# Patient Record
Sex: MALE | Race: WHITE | HISPANIC OR LATINO | Employment: OTHER | URBAN - METROPOLITAN AREA
[De-identification: names, ages, dates, MRNs, and addresses within clinical notes are randomized per-mention and may not be internally consistent; named-entity substitution may affect disease eponyms.]

---

## 2017-01-06 ENCOUNTER — HOSPITAL ENCOUNTER (OUTPATIENT)
Dept: RADIOLOGY | Facility: HOSPITAL | Age: 57
Discharge: HOME | DRG: 641 | End: 2017-01-06
Attending: ORTHOPAEDIC SURGERY
Payer: MEDICARE

## 2017-01-06 ENCOUNTER — TRANSCRIBE ORDERS (OUTPATIENT)
Dept: ADMINISTRATIVE | Facility: HOSPITAL | Age: 57
End: 2017-01-06

## 2017-01-06 ENCOUNTER — ANESTHESIA EVENT (OUTPATIENT)
Dept: PERIOP | Facility: HOSPITAL | Age: 57
End: 2017-01-06
Payer: MEDICARE

## 2017-01-06 ENCOUNTER — APPOINTMENT (OUTPATIENT)
Dept: LAB | Facility: HOSPITAL | Age: 57
DRG: 641 | End: 2017-01-06
Attending: ORTHOPAEDIC SURGERY
Payer: MEDICARE

## 2017-01-06 ENCOUNTER — HOSPITAL ENCOUNTER (OUTPATIENT)
Dept: NON INVASIVE DIAGNOSTICS | Facility: HOSPITAL | Age: 57
Discharge: HOME | DRG: 641 | End: 2017-01-06
Attending: ORTHOPAEDIC SURGERY
Payer: MEDICARE

## 2017-01-06 ENCOUNTER — HOSPITAL ENCOUNTER (INPATIENT)
Facility: HOSPITAL | Age: 57
LOS: 3 days | Discharge: HOME | DRG: 641 | End: 2017-01-09
Attending: EMERGENCY MEDICINE | Admitting: INTERNAL MEDICINE
Payer: MEDICARE

## 2017-01-06 ENCOUNTER — GENERIC CONVERSION - ENCOUNTER (OUTPATIENT)
Dept: OTHER | Facility: OTHER | Age: 57
End: 2017-01-06

## 2017-01-06 ENCOUNTER — APPOINTMENT (OUTPATIENT)
Dept: PREADMISSION TESTING | Facility: HOSPITAL | Age: 57
DRG: 641 | End: 2017-01-06
Payer: MEDICARE

## 2017-01-06 VITALS
HEIGHT: 74 IN | BODY MASS INDEX: 40.43 KG/M2 | HEART RATE: 62 BPM | DIASTOLIC BLOOD PRESSURE: 56 MMHG | SYSTOLIC BLOOD PRESSURE: 107 MMHG | RESPIRATION RATE: 22 BRPM | WEIGHT: 315 LBS | TEMPERATURE: 98.8 F

## 2017-01-06 DIAGNOSIS — Z01.818 OTHER SPECIFIED PRE-OPERATIVE EXAMINATION: ICD-10-CM

## 2017-01-06 DIAGNOSIS — G89.4 CHRONIC PAIN SYNDROME: ICD-10-CM

## 2017-01-06 DIAGNOSIS — N17.9 ACUTE RENAL FAILURE (ARF) (HCC): ICD-10-CM

## 2017-01-06 DIAGNOSIS — R19.7 ACUTE DIARRHEA: ICD-10-CM

## 2017-01-06 DIAGNOSIS — G89.4 CHRONIC PAIN SYNDROME: Primary | ICD-10-CM

## 2017-01-06 DIAGNOSIS — E87.6 ACUTE HYPOKALEMIA: Primary | ICD-10-CM

## 2017-01-06 LAB
ABO GROUP BLD: NORMAL
ALBUMIN SERPL BCP-MCNC: 3.1 G/DL (ref 3.5–5)
ALP SERPL-CCNC: 158 U/L (ref 46–116)
ALT SERPL W P-5'-P-CCNC: 23 U/L (ref 12–78)
ANION GAP SERPL CALCULATED.3IONS-SCNC: 8 MMOL/L (ref 4–13)
AST SERPL W P-5'-P-CCNC: 18 U/L (ref 5–45)
BACTERIA UR QL AUTO: NORMAL /HPF
BASOPHILS # BLD AUTO: 0.03 THOUSANDS/ΜL (ref 0–0.1)
BASOPHILS NFR BLD AUTO: 0 % (ref 0–1)
BILIRUB SERPL-MCNC: 0.59 MG/DL (ref 0.2–1)
BILIRUB UR QL STRIP: NEGATIVE
BLD GP AB SCN SERPL QL: NEGATIVE
BUN SERPL-MCNC: 36 MG/DL (ref 5–25)
CALCIUM SERPL-MCNC: 8.6 MG/DL (ref 8.3–10.1)
CHLORIDE SERPL-SCNC: 86 MMOL/L (ref 100–108)
CLARITY UR: CLEAR
CO2 SERPL-SCNC: 39 MMOL/L (ref 21–32)
COLOR UR: YELLOW
CREAT SERPL-MCNC: 1.96 MG/DL (ref 0.6–1.3)
EOSINOPHIL # BLD AUTO: 0.04 THOUSAND/ΜL (ref 0–0.61)
EOSINOPHIL NFR BLD AUTO: 0 % (ref 0–6)
ERYTHROCYTE [DISTWIDTH] IN BLOOD BY AUTOMATED COUNT: 15.2 % (ref 11.6–15.1)
GFR SERPL CREATININE-BSD FRML MDRD: 35.8 ML/MIN/1.73SQ M
GLUCOSE SERPL-MCNC: 191 MG/DL (ref 65–140)
GLUCOSE UR STRIP-MCNC: ABNORMAL MG/DL
HCT VFR BLD AUTO: 43.2 % (ref 36.5–49.3)
HGB BLD-MCNC: 14.1 G/DL (ref 12–17)
HGB UR QL STRIP.AUTO: NEGATIVE
KETONES UR STRIP-MCNC: NEGATIVE MG/DL
LEUKOCYTE ESTERASE UR QL STRIP: ABNORMAL
LYMPHOCYTES # BLD AUTO: 1.68 THOUSANDS/ΜL (ref 0.6–4.47)
LYMPHOCYTES NFR BLD AUTO: 10 % (ref 14–44)
MAGNESIUM SERPL-MCNC: 3 MG/DL (ref 1.6–2.6)
MCH RBC QN AUTO: 28.8 PG (ref 26.8–34.3)
MCHC RBC AUTO-ENTMCNC: 32.6 G/DL (ref 31.4–37.4)
MCV RBC AUTO: 88 FL (ref 82–98)
MONOCYTES # BLD AUTO: 1.1 THOUSAND/ΜL (ref 0.17–1.22)
MONOCYTES NFR BLD AUTO: 7 % (ref 4–12)
NEUTROPHILS # BLD AUTO: 13.43 THOUSANDS/ΜL (ref 1.85–7.62)
NEUTS SEG NFR BLD AUTO: 83 % (ref 43–75)
NITRITE UR QL STRIP: NEGATIVE
NON-SQ EPI CELLS URNS QL MICRO: NORMAL /HPF
NRBC BLD AUTO-RTO: 0 /100 WBCS
NT-PROBNP SERPL-MCNC: 120 PG/ML
PH UR STRIP.AUTO: 7 [PH] (ref 4.5–8)
PHOSPHATE SERPL-MCNC: 3.4 MG/DL (ref 2.7–4.5)
PLATELET # BLD AUTO: 322 THOUSANDS/UL (ref 149–390)
PMV BLD AUTO: 10.8 FL (ref 8.9–12.7)
POTASSIUM SERPL-SCNC: 1.8 MMOL/L (ref 3.5–5.3)
POTASSIUM SERPL-SCNC: 2.2 MMOL/L (ref 3.5–5.3)
PROT SERPL-MCNC: 7.5 G/DL (ref 6.4–8.2)
PROT UR STRIP-MCNC: NEGATIVE MG/DL
RBC # BLD AUTO: 4.9 MILLION/UL (ref 3.88–5.62)
RBC #/AREA URNS AUTO: NORMAL /HPF
RH BLD: POSITIVE
SODIUM SERPL-SCNC: 133 MMOL/L (ref 136–145)
SP GR UR STRIP.AUTO: 1.01 (ref 1–1.03)
SPECIMEN SOURCE: NORMAL
TROPONIN I BLD-MCNC: 0.01 NG/ML (ref 0–0.08)
UROBILINOGEN UR QL STRIP.AUTO: 1 E.U./DL
WBC # BLD AUTO: 16.28 THOUSAND/UL (ref 4.31–10.16)
WBC #/AREA URNS AUTO: NORMAL /HPF

## 2017-01-06 PROCEDURE — 87086 URINE CULTURE/COLONY COUNT: CPT | Performed by: ORTHOPAEDIC SURGERY

## 2017-01-06 PROCEDURE — 86900 BLOOD TYPING SEROLOGIC ABO: CPT

## 2017-01-06 PROCEDURE — 87081 CULTURE SCREEN ONLY: CPT

## 2017-01-06 PROCEDURE — 85025 COMPLETE CBC W/AUTO DIFF WBC: CPT

## 2017-01-06 PROCEDURE — 93005 ELECTROCARDIOGRAM TRACING: CPT

## 2017-01-06 PROCEDURE — 86803 HEPATITIS C AB TEST: CPT

## 2017-01-06 PROCEDURE — 81001 URINALYSIS AUTO W/SCOPE: CPT | Performed by: ORTHOPAEDIC SURGERY

## 2017-01-06 PROCEDURE — 93005 ELECTROCARDIOGRAM TRACING: CPT | Performed by: EMERGENCY MEDICINE

## 2017-01-06 PROCEDURE — 86901 BLOOD TYPING SEROLOGIC RH(D): CPT

## 2017-01-06 PROCEDURE — 84132 ASSAY OF SERUM POTASSIUM: CPT | Performed by: PHYSICIAN ASSISTANT

## 2017-01-06 PROCEDURE — 83880 ASSAY OF NATRIURETIC PEPTIDE: CPT | Performed by: PHYSICIAN ASSISTANT

## 2017-01-06 PROCEDURE — 86850 RBC ANTIBODY SCREEN: CPT

## 2017-01-06 PROCEDURE — 80053 COMPREHEN METABOLIC PANEL: CPT

## 2017-01-06 PROCEDURE — 83735 ASSAY OF MAGNESIUM: CPT | Performed by: PHYSICIAN ASSISTANT

## 2017-01-06 PROCEDURE — 84484 ASSAY OF TROPONIN QUANT: CPT

## 2017-01-06 PROCEDURE — 96374 THER/PROPH/DIAG INJ IV PUSH: CPT

## 2017-01-06 PROCEDURE — 84100 ASSAY OF PHOSPHORUS: CPT | Performed by: PHYSICIAN ASSISTANT

## 2017-01-06 PROCEDURE — 36415 COLL VENOUS BLD VENIPUNCTURE: CPT

## 2017-01-06 RX ORDER — MORPHINE SULFATE 100 MG/1
100 TABLET ORAL 2 TIMES DAILY
Status: ON HOLD | COMMUNITY
End: 2019-05-09 | Stop reason: SDUPTHER

## 2017-01-06 RX ORDER — SODIUM CHLORIDE 9 MG/ML
125 INJECTION, SOLUTION INTRAVENOUS CONTINUOUS
Status: CANCELLED | OUTPATIENT
Start: 2017-01-06

## 2017-01-06 RX ORDER — POTASSIUM CHLORIDE 14.9 MG/ML
20 INJECTION INTRAVENOUS ONCE
Status: DISCONTINUED | OUTPATIENT
Start: 2017-01-06 | End: 2017-01-07

## 2017-01-06 RX ORDER — POTASSIUM CHLORIDE 20 MEQ/1
20 TABLET, EXTENDED RELEASE ORAL ONCE
Status: COMPLETED | OUTPATIENT
Start: 2017-01-06 | End: 2017-01-06

## 2017-01-06 RX ORDER — POTASSIUM CHLORIDE 14.9 MG/ML
20 INJECTION INTRAVENOUS ONCE
Status: COMPLETED | OUTPATIENT
Start: 2017-01-06 | End: 2017-01-06

## 2017-01-06 RX ORDER — OMEPRAZOLE 20 MG/1
20 CAPSULE, DELAYED RELEASE ORAL 2 TIMES DAILY
COMMUNITY
End: 2018-01-31 | Stop reason: DRUGHIGH

## 2017-01-06 RX ORDER — ZOLPIDEM TARTRATE 10 MG/1
10 TABLET ORAL
Status: ON HOLD | COMMUNITY
End: 2020-01-31 | Stop reason: ALTCHOICE

## 2017-01-06 RX ADMIN — POTASSIUM CHLORIDE 20 MEQ: 1500 TABLET, EXTENDED RELEASE ORAL at 20:58

## 2017-01-06 RX ADMIN — SODIUM CHLORIDE 500 ML: 0.9 INJECTION, SOLUTION INTRAVENOUS at 23:40

## 2017-01-06 RX ADMIN — POTASSIUM CHLORIDE 20 MEQ: 200 INJECTION, SOLUTION INTRAVENOUS at 23:41

## 2017-01-06 RX ADMIN — POTASSIUM CHLORIDE 20 MEQ: 200 INJECTION, SOLUTION INTRAVENOUS at 21:06

## 2017-01-06 RX ADMIN — SODIUM CHLORIDE 1000 ML: 0.9 INJECTION, SOLUTION INTRAVENOUS at 21:06

## 2017-01-07 ENCOUNTER — APPOINTMENT (INPATIENT)
Dept: RADIOLOGY | Facility: HOSPITAL | Age: 57
DRG: 641 | End: 2017-01-07
Payer: MEDICARE

## 2017-01-07 LAB
ANION GAP SERPL CALCULATED.3IONS-SCNC: 5 MMOL/L (ref 4–13)
ANION GAP SERPL CALCULATED.3IONS-SCNC: 6 MMOL/L (ref 4–13)
ATRIAL RATE: 55 BPM
ATRIAL RATE: 56 BPM
BACTERIA UR CULT: NORMAL
BUN SERPL-MCNC: 25 MG/DL (ref 5–25)
BUN SERPL-MCNC: 30 MG/DL (ref 5–25)
CALCIUM SERPL-MCNC: 7.9 MG/DL (ref 8.3–10.1)
CALCIUM SERPL-MCNC: 8.1 MG/DL (ref 8.3–10.1)
CHLORIDE SERPL-SCNC: 93 MMOL/L (ref 100–108)
CHLORIDE SERPL-SCNC: 97 MMOL/L (ref 100–108)
CO2 SERPL-SCNC: 30 MMOL/L (ref 21–32)
CO2 SERPL-SCNC: 38 MMOL/L (ref 21–32)
CREAT SERPL-MCNC: 1.54 MG/DL (ref 0.6–1.3)
CREAT SERPL-MCNC: 1.67 MG/DL (ref 0.6–1.3)
ERYTHROCYTE [DISTWIDTH] IN BLOOD BY AUTOMATED COUNT: 15.4 % (ref 11.6–15.1)
GFR SERPL CREATININE-BSD FRML MDRD: 43.1 ML/MIN/1.73SQ M
GFR SERPL CREATININE-BSD FRML MDRD: 47.3 ML/MIN/1.73SQ M
GLUCOSE SERPL-MCNC: 138 MG/DL (ref 65–140)
GLUCOSE SERPL-MCNC: 138 MG/DL (ref 65–140)
GLUCOSE SERPL-MCNC: 169 MG/DL (ref 65–140)
GLUCOSE SERPL-MCNC: 189 MG/DL (ref 65–140)
GLUCOSE SERPL-MCNC: 194 MG/DL (ref 65–140)
GLUCOSE SERPL-MCNC: 199 MG/DL (ref 65–140)
HCT VFR BLD AUTO: 39.9 % (ref 36.5–49.3)
HCV AB SER QL: NORMAL
HGB BLD-MCNC: 12.9 G/DL (ref 12–17)
MCH RBC QN AUTO: 28.7 PG (ref 26.8–34.3)
MCHC RBC AUTO-ENTMCNC: 32.3 G/DL (ref 31.4–37.4)
MCV RBC AUTO: 89 FL (ref 82–98)
MRSA NOSE QL CULT: NORMAL
P AXIS: 45 DEGREES
P AXIS: 89 DEGREES
PLATELET # BLD AUTO: 245 THOUSANDS/UL (ref 149–390)
PMV BLD AUTO: 10.3 FL (ref 8.9–12.7)
POTASSIUM SERPL-SCNC: 2.1 MMOL/L (ref 3.5–5.3)
POTASSIUM SERPL-SCNC: 2.9 MMOL/L (ref 3.5–5.3)
PR INTERVAL: 202 MS
PR INTERVAL: 208 MS
QRS AXIS: -18 DEGREES
QRS AXIS: -4 DEGREES
QRSD INTERVAL: 100 MS
QRSD INTERVAL: 98 MS
QT INTERVAL: 588 MS
QT INTERVAL: 604 MS
QTC INTERVAL: 562 MS
QTC INTERVAL: 582 MS
RBC # BLD AUTO: 4.49 MILLION/UL (ref 3.88–5.62)
SODIUM SERPL-SCNC: 133 MMOL/L (ref 136–145)
SODIUM SERPL-SCNC: 136 MMOL/L (ref 136–145)
T WAVE AXIS: 44 DEGREES
T WAVE AXIS: 60 DEGREES
VENTRICULAR RATE: 55 BPM
VENTRICULAR RATE: 56 BPM
WBC # BLD AUTO: 14.68 THOUSAND/UL (ref 4.31–10.16)

## 2017-01-07 PROCEDURE — 80048 BASIC METABOLIC PNL TOTAL CA: CPT | Performed by: PHYSICIAN ASSISTANT

## 2017-01-07 PROCEDURE — 87015 SPECIMEN INFECT AGNT CONCNTJ: CPT | Performed by: PHYSICIAN ASSISTANT

## 2017-01-07 PROCEDURE — 87045 FECES CULTURE AEROBIC BACT: CPT | Performed by: PHYSICIAN ASSISTANT

## 2017-01-07 PROCEDURE — 87899 AGENT NOS ASSAY W/OPTIC: CPT | Performed by: PHYSICIAN ASSISTANT

## 2017-01-07 PROCEDURE — 87493 C DIFF AMPLIFIED PROBE: CPT | Performed by: PHYSICIAN ASSISTANT

## 2017-01-07 PROCEDURE — 80048 BASIC METABOLIC PNL TOTAL CA: CPT | Performed by: INTERNAL MEDICINE

## 2017-01-07 PROCEDURE — 71010 HB CHEST X-RAY 1 VIEW FRONTAL (PORTABLE): CPT

## 2017-01-07 PROCEDURE — 82948 REAGENT STRIP/BLOOD GLUCOSE: CPT

## 2017-01-07 PROCEDURE — 87046 STOOL CULTR AEROBIC BACT EA: CPT | Performed by: PHYSICIAN ASSISTANT

## 2017-01-07 PROCEDURE — 89055 LEUKOCYTE ASSESSMENT FECAL: CPT | Performed by: PHYSICIAN ASSISTANT

## 2017-01-07 PROCEDURE — 99285 EMERGENCY DEPT VISIT HI MDM: CPT

## 2017-01-07 PROCEDURE — 85027 COMPLETE CBC AUTOMATED: CPT | Performed by: PHYSICIAN ASSISTANT

## 2017-01-07 RX ORDER — SODIUM CHLORIDE 9 MG/ML
125 INJECTION, SOLUTION INTRAVENOUS CONTINUOUS
Status: DISCONTINUED | OUTPATIENT
Start: 2017-01-07 | End: 2017-01-07

## 2017-01-07 RX ORDER — TOPIRAMATE 100 MG/1
100 TABLET, FILM COATED ORAL 2 TIMES DAILY
Status: DISCONTINUED | OUTPATIENT
Start: 2017-01-07 | End: 2017-01-09 | Stop reason: HOSPADM

## 2017-01-07 RX ORDER — NEBIVOLOL 10 MG/1
10 TABLET ORAL DAILY
Status: DISCONTINUED | OUTPATIENT
Start: 2017-01-07 | End: 2017-01-09 | Stop reason: HOSPADM

## 2017-01-07 RX ORDER — TRAZODONE HYDROCHLORIDE 100 MG/1
100 TABLET ORAL
Status: DISCONTINUED | OUTPATIENT
Start: 2017-01-07 | End: 2017-01-09 | Stop reason: HOSPADM

## 2017-01-07 RX ORDER — OXYCODONE HYDROCHLORIDE 5 MG/1
30 TABLET ORAL EVERY 4 HOURS PRN
Status: DISCONTINUED | OUTPATIENT
Start: 2017-01-07 | End: 2017-01-09 | Stop reason: HOSPADM

## 2017-01-07 RX ORDER — ASPIRIN 81 MG/1
81 TABLET, CHEWABLE ORAL DAILY
Status: DISCONTINUED | OUTPATIENT
Start: 2017-01-07 | End: 2017-01-09 | Stop reason: HOSPADM

## 2017-01-07 RX ORDER — POTASSIUM CHLORIDE AND SODIUM CHLORIDE 900; 300 MG/100ML; MG/100ML
75 INJECTION, SOLUTION INTRAVENOUS CONTINUOUS
Status: DISCONTINUED | OUTPATIENT
Start: 2017-01-07 | End: 2017-01-09 | Stop reason: HOSPADM

## 2017-01-07 RX ORDER — ZOLPIDEM TARTRATE 5 MG/1
10 TABLET ORAL
Status: DISCONTINUED | OUTPATIENT
Start: 2017-01-07 | End: 2017-01-09 | Stop reason: HOSPADM

## 2017-01-07 RX ORDER — TAMSULOSIN HYDROCHLORIDE 0.4 MG/1
0.4 CAPSULE ORAL
Status: DISCONTINUED | OUTPATIENT
Start: 2017-01-07 | End: 2017-01-09 | Stop reason: HOSPADM

## 2017-01-07 RX ORDER — ATORVASTATIN CALCIUM 40 MG/1
40 TABLET, FILM COATED ORAL
Status: DISCONTINUED | OUTPATIENT
Start: 2017-01-07 | End: 2017-01-09 | Stop reason: HOSPADM

## 2017-01-07 RX ORDER — POTASSIUM CHLORIDE 20MEQ/15ML
20 LIQUID (ML) ORAL ONCE
Status: DISCONTINUED | OUTPATIENT
Start: 2017-01-07 | End: 2017-01-07

## 2017-01-07 RX ORDER — BACLOFEN 10 MG/1
10 TABLET ORAL 3 TIMES DAILY
Status: DISCONTINUED | OUTPATIENT
Start: 2017-01-07 | End: 2017-01-09 | Stop reason: HOSPADM

## 2017-01-07 RX ORDER — NITROGLYCERIN 0.4 MG/1
0.4 TABLET SUBLINGUAL
Status: DISCONTINUED | OUTPATIENT
Start: 2017-01-07 | End: 2017-01-09 | Stop reason: HOSPADM

## 2017-01-07 RX ORDER — CALCIUM CARBONATE 200(500)MG
1000 TABLET,CHEWABLE ORAL DAILY PRN
Status: DISCONTINUED | OUTPATIENT
Start: 2017-01-07 | End: 2017-01-09 | Stop reason: HOSPADM

## 2017-01-07 RX ORDER — FLUTICASONE PROPIONATE 110 UG/1
1 AEROSOL, METERED RESPIRATORY (INHALATION)
Status: DISCONTINUED | OUTPATIENT
Start: 2017-01-07 | End: 2017-01-09 | Stop reason: HOSPADM

## 2017-01-07 RX ORDER — POTASSIUM CHLORIDE 20 MEQ/1
20 TABLET, EXTENDED RELEASE ORAL
Status: COMPLETED | OUTPATIENT
Start: 2017-01-07 | End: 2017-01-07

## 2017-01-07 RX ORDER — CITALOPRAM 20 MG/1
40 TABLET ORAL DAILY
Status: DISCONTINUED | OUTPATIENT
Start: 2017-01-07 | End: 2017-01-09 | Stop reason: HOSPADM

## 2017-01-07 RX ORDER — RANOLAZINE 500 MG/1
1000 TABLET, EXTENDED RELEASE ORAL 2 TIMES DAILY
Status: DISCONTINUED | OUTPATIENT
Start: 2017-01-07 | End: 2017-01-09 | Stop reason: HOSPADM

## 2017-01-07 RX ORDER — HEPARIN SODIUM 5000 [USP'U]/ML
5000 INJECTION, SOLUTION INTRAVENOUS; SUBCUTANEOUS EVERY 8 HOURS SCHEDULED
Status: DISCONTINUED | OUTPATIENT
Start: 2017-01-07 | End: 2017-01-09 | Stop reason: HOSPADM

## 2017-01-07 RX ORDER — PANTOPRAZOLE SODIUM 20 MG/1
20 TABLET, DELAYED RELEASE ORAL
Status: DISCONTINUED | OUTPATIENT
Start: 2017-01-07 | End: 2017-01-09 | Stop reason: HOSPADM

## 2017-01-07 RX ORDER — TIZANIDINE 2 MG/1
2 TABLET ORAL 3 TIMES DAILY
Status: DISCONTINUED | OUTPATIENT
Start: 2017-01-07 | End: 2017-01-09 | Stop reason: HOSPADM

## 2017-01-07 RX ORDER — GABAPENTIN 400 MG/1
800 CAPSULE ORAL 2 TIMES DAILY
Status: DISCONTINUED | OUTPATIENT
Start: 2017-01-07 | End: 2017-01-09 | Stop reason: HOSPADM

## 2017-01-07 RX ORDER — ONDANSETRON 2 MG/ML
4 INJECTION INTRAMUSCULAR; INTRAVENOUS EVERY 6 HOURS PRN
Status: DISCONTINUED | OUTPATIENT
Start: 2017-01-07 | End: 2017-01-09 | Stop reason: HOSPADM

## 2017-01-07 RX ORDER — INSULIN GLARGINE 100 [IU]/ML
90 INJECTION, SOLUTION SUBCUTANEOUS
Status: DISCONTINUED | OUTPATIENT
Start: 2017-01-07 | End: 2017-01-09 | Stop reason: HOSPADM

## 2017-01-07 RX ORDER — ALBUTEROL SULFATE 90 UG/1
1 AEROSOL, METERED RESPIRATORY (INHALATION) EVERY 4 HOURS PRN
Status: DISCONTINUED | OUTPATIENT
Start: 2017-01-07 | End: 2017-01-09 | Stop reason: HOSPADM

## 2017-01-07 RX ORDER — POTASSIUM CHLORIDE 20 MEQ/1
40 TABLET, EXTENDED RELEASE ORAL ONCE
Status: COMPLETED | OUTPATIENT
Start: 2017-01-07 | End: 2017-01-07

## 2017-01-07 RX ORDER — CLOPIDOGREL BISULFATE 75 MG/1
75 TABLET ORAL DAILY
Status: DISCONTINUED | OUTPATIENT
Start: 2017-01-07 | End: 2017-01-09 | Stop reason: HOSPADM

## 2017-01-07 RX ORDER — ACETAMINOPHEN 325 MG/1
650 TABLET ORAL EVERY 4 HOURS PRN
Status: DISCONTINUED | OUTPATIENT
Start: 2017-01-07 | End: 2017-01-09 | Stop reason: HOSPADM

## 2017-01-07 RX ADMIN — MORPHINE SULFATE 105 MG: 30 TABLET, EXTENDED RELEASE ORAL at 20:49

## 2017-01-07 RX ADMIN — FLUTICASONE PROPIONATE 1 PUFF: 110 AEROSOL, METERED RESPIRATORY (INHALATION) at 02:00

## 2017-01-07 RX ADMIN — TOPIRAMATE 100 MG: 100 TABLET, FILM COATED ORAL at 09:52

## 2017-01-07 RX ADMIN — BACLOFEN 10 MG: 10 TABLET ORAL at 20:45

## 2017-01-07 RX ADMIN — FLUTICASONE PROPIONATE AND SALMETEROL 1 PUFF: 50; 250 POWDER RESPIRATORY (INHALATION) at 09:54

## 2017-01-07 RX ADMIN — POTASSIUM CHLORIDE 20 MEQ: 1500 TABLET, EXTENDED RELEASE ORAL at 09:49

## 2017-01-07 RX ADMIN — BACLOFEN 10 MG: 10 TABLET ORAL at 09:51

## 2017-01-07 RX ADMIN — TIZANIDINE HYDROCHLORIDE 2 MG: 2 TABLET ORAL at 09:53

## 2017-01-07 RX ADMIN — GABAPENTIN 800 MG: 400 CAPSULE ORAL at 17:42

## 2017-01-07 RX ADMIN — MORPHINE SULFATE 105 MG: 30 TABLET, EXTENDED RELEASE ORAL at 09:48

## 2017-01-07 RX ADMIN — BACLOFEN 10 MG: 10 TABLET ORAL at 15:25

## 2017-01-07 RX ADMIN — CLOPIDOGREL BISULFATE 75 MG: 75 TABLET ORAL at 09:49

## 2017-01-07 RX ADMIN — TOPIRAMATE 100 MG: 100 TABLET, FILM COATED ORAL at 17:45

## 2017-01-07 RX ADMIN — POTASSIUM CHLORIDE 40 MEQ: 1500 TABLET, EXTENDED RELEASE ORAL at 20:49

## 2017-01-07 RX ADMIN — NEBIVOLOL HYDROCHLORIDE 10 MG: 10 TABLET ORAL at 09:52

## 2017-01-07 RX ADMIN — TRAZODONE HYDROCHLORIDE 100 MG: 100 TABLET ORAL at 21:59

## 2017-01-07 RX ADMIN — POTASSIUM CHLORIDE AND SODIUM CHLORIDE 75 ML/HR: 900; 300 INJECTION, SOLUTION INTRAVENOUS at 02:05

## 2017-01-07 RX ADMIN — POTASSIUM CHLORIDE 20 MEQ: 1500 TABLET, EXTENDED RELEASE ORAL at 11:25

## 2017-01-07 RX ADMIN — ATORVASTATIN CALCIUM 40 MG: 20 TABLET, FILM COATED ORAL at 16:10

## 2017-01-07 RX ADMIN — HEPARIN SODIUM 5000 UNITS: 5000 INJECTION, SOLUTION INTRAVENOUS; SUBCUTANEOUS at 21:59

## 2017-01-07 RX ADMIN — POTASSIUM CHLORIDE 20 MEQ: 1500 TABLET, EXTENDED RELEASE ORAL at 15:24

## 2017-01-07 RX ADMIN — CITALOPRAM HYDROBROMIDE 40 MG: 20 TABLET ORAL at 09:52

## 2017-01-07 RX ADMIN — FLUTICASONE PROPIONATE AND SALMETEROL 1 PUFF: 50; 250 POWDER RESPIRATORY (INHALATION) at 17:41

## 2017-01-07 RX ADMIN — FLUTICASONE PROPIONATE 1 PUFF: 110 AEROSOL, METERED RESPIRATORY (INHALATION) at 20:44

## 2017-01-07 RX ADMIN — GABAPENTIN 800 MG: 400 CAPSULE ORAL at 12:13

## 2017-01-07 RX ADMIN — TRAZODONE HYDROCHLORIDE 100 MG: 100 TABLET ORAL at 02:00

## 2017-01-07 RX ADMIN — TIZANIDINE HYDROCHLORIDE 2 MG: 2 TABLET ORAL at 16:02

## 2017-01-07 RX ADMIN — ALBUTEROL SULFATE 1 PUFF: 90 AEROSOL, METERED RESPIRATORY (INHALATION) at 10:01

## 2017-01-07 RX ADMIN — LUBIPROSTONE 24 MCG: 24 CAPSULE, GELATIN COATED ORAL at 09:51

## 2017-01-07 RX ADMIN — PANTOPRAZOLE SODIUM 20 MG: 20 TABLET, DELAYED RELEASE ORAL at 05:48

## 2017-01-07 RX ADMIN — OXYCODONE HYDROCHLORIDE 30 MG: 5 TABLET ORAL at 16:11

## 2017-01-07 RX ADMIN — RANOLAZINE 1000 MG: 500 TABLET, FILM COATED, EXTENDED RELEASE ORAL at 09:52

## 2017-01-07 RX ADMIN — OXYCODONE HYDROCHLORIDE 30 MG: 5 TABLET ORAL at 05:59

## 2017-01-07 RX ADMIN — POTASSIUM CHLORIDE 20 MEQ: 1500 TABLET, EXTENDED RELEASE ORAL at 12:24

## 2017-01-07 RX ADMIN — INSULIN GLARGINE 90 UNITS: 100 INJECTION, SOLUTION SUBCUTANEOUS at 21:59

## 2017-01-07 RX ADMIN — LUBIPROSTONE 24 MCG: 24 CAPSULE, GELATIN COATED ORAL at 16:01

## 2017-01-07 RX ADMIN — MORPHINE SULFATE 105 MG: 30 TABLET, EXTENDED RELEASE ORAL at 16:10

## 2017-01-07 RX ADMIN — TAMSULOSIN HYDROCHLORIDE 0.4 MG: 0.4 CAPSULE ORAL at 16:11

## 2017-01-07 RX ADMIN — INSULIN LISPRO 1 UNITS: 100 INJECTION, SOLUTION INTRAVENOUS; SUBCUTANEOUS at 16:15

## 2017-01-07 RX ADMIN — OXYCODONE HYDROCHLORIDE 30 MG: 5 TABLET ORAL at 22:05

## 2017-01-07 RX ADMIN — HEPARIN SODIUM 5000 UNITS: 5000 INJECTION, SOLUTION INTRAVENOUS; SUBCUTANEOUS at 06:02

## 2017-01-07 RX ADMIN — ASPIRIN 81 MG 81 MG: 81 TABLET ORAL at 09:48

## 2017-01-07 RX ADMIN — ALBUTEROL SULFATE 1 PUFF: 90 AEROSOL, METERED RESPIRATORY (INHALATION) at 10:02

## 2017-01-07 RX ADMIN — OXYCODONE HYDROCHLORIDE 30 MG: 5 TABLET ORAL at 12:11

## 2017-01-07 RX ADMIN — HEPARIN SODIUM 5000 UNITS: 5000 INJECTION, SOLUTION INTRAVENOUS; SUBCUTANEOUS at 14:42

## 2017-01-07 RX ADMIN — TIOTROPIUM BROMIDE 18 MCG: 18 CAPSULE ORAL; RESPIRATORY (INHALATION) at 09:54

## 2017-01-07 RX ADMIN — TIZANIDINE HYDROCHLORIDE 2 MG: 2 TABLET ORAL at 20:45

## 2017-01-07 RX ADMIN — RANOLAZINE 1000 MG: 500 TABLET, FILM COATED, EXTENDED RELEASE ORAL at 17:42

## 2017-01-08 LAB
ANION GAP SERPL CALCULATED.3IONS-SCNC: 4 MMOL/L (ref 4–13)
BUN SERPL-MCNC: 19 MG/DL (ref 5–25)
C DIFF TOX GENS STL QL NAA+PROBE: NORMAL
CALCIUM SERPL-MCNC: 7.8 MG/DL (ref 8.3–10.1)
CHLORIDE SERPL-SCNC: 104 MMOL/L (ref 100–108)
CO2 SERPL-SCNC: 32 MMOL/L (ref 21–32)
CREAT SERPL-MCNC: 1.26 MG/DL (ref 0.6–1.3)
GFR SERPL CREATININE-BSD FRML MDRD: 59.6 ML/MIN/1.73SQ M
GLUCOSE SERPL-MCNC: 131 MG/DL (ref 65–140)
GLUCOSE SERPL-MCNC: 134 MG/DL (ref 65–140)
GLUCOSE SERPL-MCNC: 145 MG/DL (ref 65–140)
GLUCOSE SERPL-MCNC: 168 MG/DL (ref 65–140)
POTASSIUM SERPL-SCNC: 3 MMOL/L (ref 3.5–5.3)
SODIUM SERPL-SCNC: 140 MMOL/L (ref 136–145)

## 2017-01-08 PROCEDURE — 82948 REAGENT STRIP/BLOOD GLUCOSE: CPT

## 2017-01-08 PROCEDURE — 80048 BASIC METABOLIC PNL TOTAL CA: CPT | Performed by: INTERNAL MEDICINE

## 2017-01-08 RX ORDER — POTASSIUM CHLORIDE 20 MEQ/1
40 TABLET, EXTENDED RELEASE ORAL 2 TIMES DAILY
Status: DISCONTINUED | OUTPATIENT
Start: 2017-01-08 | End: 2017-01-08

## 2017-01-08 RX ORDER — POTASSIUM CHLORIDE 20 MEQ/1
40 TABLET, EXTENDED RELEASE ORAL DAILY
Status: DISCONTINUED | OUTPATIENT
Start: 2017-01-08 | End: 2017-01-09 | Stop reason: HOSPADM

## 2017-01-08 RX ADMIN — TOPIRAMATE 100 MG: 100 TABLET, FILM COATED ORAL at 18:04

## 2017-01-08 RX ADMIN — TRAZODONE HYDROCHLORIDE 100 MG: 100 TABLET ORAL at 21:10

## 2017-01-08 RX ADMIN — BACLOFEN 10 MG: 10 TABLET ORAL at 20:21

## 2017-01-08 RX ADMIN — GABAPENTIN 800 MG: 400 CAPSULE ORAL at 18:04

## 2017-01-08 RX ADMIN — OXYCODONE HYDROCHLORIDE 30 MG: 5 TABLET ORAL at 12:18

## 2017-01-08 RX ADMIN — BACLOFEN 10 MG: 10 TABLET ORAL at 16:32

## 2017-01-08 RX ADMIN — GABAPENTIN 800 MG: 400 CAPSULE ORAL at 09:23

## 2017-01-08 RX ADMIN — CITALOPRAM HYDROBROMIDE 40 MG: 20 TABLET ORAL at 09:20

## 2017-01-08 RX ADMIN — TIOTROPIUM BROMIDE 18 MCG: 18 CAPSULE ORAL; RESPIRATORY (INHALATION) at 09:24

## 2017-01-08 RX ADMIN — RANOLAZINE 1000 MG: 500 TABLET, FILM COATED, EXTENDED RELEASE ORAL at 09:21

## 2017-01-08 RX ADMIN — MORPHINE SULFATE 105 MG: 30 TABLET, EXTENDED RELEASE ORAL at 20:21

## 2017-01-08 RX ADMIN — BACLOFEN 10 MG: 10 TABLET ORAL at 12:18

## 2017-01-08 RX ADMIN — MORPHINE SULFATE 105 MG: 30 TABLET, EXTENDED RELEASE ORAL at 09:16

## 2017-01-08 RX ADMIN — RANOLAZINE 1000 MG: 500 TABLET, FILM COATED, EXTENDED RELEASE ORAL at 18:04

## 2017-01-08 RX ADMIN — TAMSULOSIN HYDROCHLORIDE 0.4 MG: 0.4 CAPSULE ORAL at 16:18

## 2017-01-08 RX ADMIN — FLUTICASONE PROPIONATE 1 PUFF: 110 AEROSOL, METERED RESPIRATORY (INHALATION) at 09:25

## 2017-01-08 RX ADMIN — HEPARIN SODIUM 5000 UNITS: 5000 INJECTION, SOLUTION INTRAVENOUS; SUBCUTANEOUS at 14:21

## 2017-01-08 RX ADMIN — CLOPIDOGREL BISULFATE 75 MG: 75 TABLET ORAL at 09:17

## 2017-01-08 RX ADMIN — POTASSIUM CHLORIDE 40 MEQ: 1500 TABLET, EXTENDED RELEASE ORAL at 12:18

## 2017-01-08 RX ADMIN — ATORVASTATIN CALCIUM 40 MG: 20 TABLET, FILM COATED ORAL at 16:18

## 2017-01-08 RX ADMIN — OXYCODONE HYDROCHLORIDE 30 MG: 5 TABLET ORAL at 16:20

## 2017-01-08 RX ADMIN — FLUTICASONE PROPIONATE 1 PUFF: 110 AEROSOL, METERED RESPIRATORY (INHALATION) at 20:24

## 2017-01-08 RX ADMIN — FLUTICASONE PROPIONATE AND SALMETEROL 1 PUFF: 50; 250 POWDER RESPIRATORY (INHALATION) at 18:04

## 2017-01-08 RX ADMIN — INSULIN GLARGINE 90 UNITS: 100 INJECTION, SOLUTION SUBCUTANEOUS at 21:10

## 2017-01-08 RX ADMIN — HEPARIN SODIUM 5000 UNITS: 5000 INJECTION, SOLUTION INTRAVENOUS; SUBCUTANEOUS at 05:35

## 2017-01-08 RX ADMIN — TIZANIDINE HYDROCHLORIDE 2 MG: 2 TABLET ORAL at 16:32

## 2017-01-08 RX ADMIN — OXYCODONE HYDROCHLORIDE 30 MG: 5 TABLET ORAL at 03:07

## 2017-01-08 RX ADMIN — FLUTICASONE PROPIONATE AND SALMETEROL 1 PUFF: 50; 250 POWDER RESPIRATORY (INHALATION) at 09:16

## 2017-01-08 RX ADMIN — TIZANIDINE HYDROCHLORIDE 2 MG: 2 TABLET ORAL at 20:20

## 2017-01-08 RX ADMIN — PANTOPRAZOLE SODIUM 20 MG: 20 TABLET, DELAYED RELEASE ORAL at 05:35

## 2017-01-08 RX ADMIN — TIZANIDINE HYDROCHLORIDE 2 MG: 2 TABLET ORAL at 09:21

## 2017-01-08 RX ADMIN — NEBIVOLOL HYDROCHLORIDE 10 MG: 10 TABLET ORAL at 09:21

## 2017-01-08 RX ADMIN — ASPIRIN 81 MG 81 MG: 81 TABLET ORAL at 09:16

## 2017-01-08 RX ADMIN — OXYCODONE HYDROCHLORIDE 30 MG: 5 TABLET ORAL at 20:20

## 2017-01-08 RX ADMIN — LUBIPROSTONE 24 MCG: 24 CAPSULE, GELATIN COATED ORAL at 09:23

## 2017-01-08 RX ADMIN — LUBIPROSTONE 24 MCG: 24 CAPSULE, GELATIN COATED ORAL at 16:31

## 2017-01-08 RX ADMIN — MORPHINE SULFATE 105 MG: 30 TABLET, EXTENDED RELEASE ORAL at 16:32

## 2017-01-08 RX ADMIN — OXYCODONE HYDROCHLORIDE 30 MG: 5 TABLET ORAL at 07:34

## 2017-01-08 RX ADMIN — HEPARIN SODIUM 5000 UNITS: 5000 INJECTION, SOLUTION INTRAVENOUS; SUBCUTANEOUS at 21:10

## 2017-01-08 RX ADMIN — POTASSIUM CHLORIDE AND SODIUM CHLORIDE 75 ML/HR: 900; 300 INJECTION, SOLUTION INTRAVENOUS at 15:51

## 2017-01-08 RX ADMIN — TOPIRAMATE 100 MG: 100 TABLET, FILM COATED ORAL at 09:20

## 2017-01-09 VITALS
RESPIRATION RATE: 20 BRPM | BODY MASS INDEX: 39.17 KG/M2 | DIASTOLIC BLOOD PRESSURE: 57 MMHG | OXYGEN SATURATION: 93 % | SYSTOLIC BLOOD PRESSURE: 117 MMHG | HEIGHT: 75 IN | TEMPERATURE: 98.5 F | WEIGHT: 315 LBS | HEART RATE: 67 BPM

## 2017-01-09 LAB
ANION GAP SERPL CALCULATED.3IONS-SCNC: 6 MMOL/L (ref 4–13)
BACTERIA STL CULT: NORMAL
BACTERIA STL CULT: NORMAL
BUN SERPL-MCNC: 14 MG/DL (ref 5–25)
CALCIUM SERPL-MCNC: 8.2 MG/DL (ref 8.3–10.1)
CHLORIDE SERPL-SCNC: 105 MMOL/L (ref 100–108)
CO2 SERPL-SCNC: 27 MMOL/L (ref 21–32)
CREAT SERPL-MCNC: 1.15 MG/DL (ref 0.6–1.3)
GFR SERPL CREATININE-BSD FRML MDRD: >60 ML/MIN/1.73SQ M
GLUCOSE SERPL-MCNC: 146 MG/DL (ref 65–140)
GLUCOSE SERPL-MCNC: 167 MG/DL (ref 65–140)
GLUCOSE SERPL-MCNC: 173 MG/DL (ref 65–140)
POTASSIUM SERPL-SCNC: 3.3 MMOL/L (ref 3.5–5.3)
SODIUM SERPL-SCNC: 138 MMOL/L (ref 136–145)

## 2017-01-09 PROCEDURE — 82948 REAGENT STRIP/BLOOD GLUCOSE: CPT

## 2017-01-09 PROCEDURE — 80048 BASIC METABOLIC PNL TOTAL CA: CPT | Performed by: HOSPITALIST

## 2017-01-09 RX ORDER — POTASSIUM CHLORIDE 20 MEQ/1
40 TABLET, EXTENDED RELEASE ORAL ONCE
Status: COMPLETED | OUTPATIENT
Start: 2017-01-09 | End: 2017-01-09

## 2017-01-09 RX ADMIN — OXYCODONE HYDROCHLORIDE 30 MG: 5 TABLET ORAL at 05:57

## 2017-01-09 RX ADMIN — GABAPENTIN 800 MG: 400 CAPSULE ORAL at 08:53

## 2017-01-09 RX ADMIN — INSULIN LISPRO 1 UNITS: 100 INJECTION, SOLUTION INTRAVENOUS; SUBCUTANEOUS at 08:54

## 2017-01-09 RX ADMIN — TIZANIDINE HYDROCHLORIDE 2 MG: 2 TABLET ORAL at 08:56

## 2017-01-09 RX ADMIN — TOPIRAMATE 100 MG: 100 TABLET, FILM COATED ORAL at 08:57

## 2017-01-09 RX ADMIN — BACLOFEN 10 MG: 10 TABLET ORAL at 08:53

## 2017-01-09 RX ADMIN — INSULIN LISPRO 1 UNITS: 100 INJECTION, SOLUTION INTRAVENOUS; SUBCUTANEOUS at 12:21

## 2017-01-09 RX ADMIN — PANTOPRAZOLE SODIUM 20 MG: 20 TABLET, DELAYED RELEASE ORAL at 05:54

## 2017-01-09 RX ADMIN — LUBIPROSTONE 24 MCG: 24 CAPSULE, GELATIN COATED ORAL at 08:56

## 2017-01-09 RX ADMIN — HEPARIN SODIUM 5000 UNITS: 5000 INJECTION, SOLUTION INTRAVENOUS; SUBCUTANEOUS at 05:54

## 2017-01-09 RX ADMIN — FLUTICASONE PROPIONATE AND SALMETEROL 1 PUFF: 50; 250 POWDER RESPIRATORY (INHALATION) at 08:55

## 2017-01-09 RX ADMIN — POTASSIUM CHLORIDE 40 MEQ: 1500 TABLET, EXTENDED RELEASE ORAL at 12:20

## 2017-01-09 RX ADMIN — RANOLAZINE 1000 MG: 500 TABLET, FILM COATED, EXTENDED RELEASE ORAL at 08:57

## 2017-01-09 RX ADMIN — NEBIVOLOL HYDROCHLORIDE 10 MG: 10 TABLET ORAL at 08:56

## 2017-01-09 RX ADMIN — MORPHINE SULFATE 105 MG: 30 TABLET, EXTENDED RELEASE ORAL at 08:53

## 2017-01-09 RX ADMIN — POTASSIUM CHLORIDE 40 MEQ: 1500 TABLET, EXTENDED RELEASE ORAL at 08:53

## 2017-01-09 RX ADMIN — FLUTICASONE PROPIONATE 1 PUFF: 110 AEROSOL, METERED RESPIRATORY (INHALATION) at 08:58

## 2017-01-09 RX ADMIN — ASPIRIN 81 MG 81 MG: 81 TABLET ORAL at 08:53

## 2017-01-09 RX ADMIN — CITALOPRAM HYDROBROMIDE 40 MG: 20 TABLET ORAL at 08:53

## 2017-01-09 RX ADMIN — TIOTROPIUM BROMIDE 18 MCG: 18 CAPSULE ORAL; RESPIRATORY (INHALATION) at 08:55

## 2017-01-09 RX ADMIN — CLOPIDOGREL BISULFATE 75 MG: 75 TABLET ORAL at 08:53

## 2017-01-09 RX ADMIN — OXYCODONE HYDROCHLORIDE 30 MG: 5 TABLET ORAL at 10:45

## 2017-01-11 LAB — WBC SPEC QL GRAM STN: NORMAL

## 2017-01-18 ENCOUNTER — ALLSCRIPTS OFFICE VISIT (OUTPATIENT)
Dept: OTHER | Facility: OTHER | Age: 57
End: 2017-01-18

## 2017-01-19 ENCOUNTER — ANESTHESIA (OUTPATIENT)
Dept: PERIOP | Facility: HOSPITAL | Age: 57
End: 2017-01-19
Payer: MEDICARE

## 2017-01-19 ENCOUNTER — HOSPITAL ENCOUNTER (OUTPATIENT)
Dept: RADIOLOGY | Facility: HOSPITAL | Age: 57
Setting detail: OUTPATIENT SURGERY
Discharge: HOME | End: 2017-01-19
Payer: MEDICARE

## 2017-01-19 ENCOUNTER — HOSPITAL ENCOUNTER (OUTPATIENT)
Facility: HOSPITAL | Age: 57
Setting detail: OUTPATIENT SURGERY
Discharge: HOME | End: 2017-01-19
Attending: ORTHOPAEDIC SURGERY | Admitting: ORTHOPAEDIC SURGERY
Payer: MEDICARE

## 2017-01-19 VITALS
OXYGEN SATURATION: 98 % | HEIGHT: 74 IN | TEMPERATURE: 99.1 F | BODY MASS INDEX: 40.43 KG/M2 | WEIGHT: 315 LBS | SYSTOLIC BLOOD PRESSURE: 168 MMHG | HEART RATE: 82 BPM | RESPIRATION RATE: 14 BRPM | DIASTOLIC BLOOD PRESSURE: 58 MMHG

## 2017-01-19 DIAGNOSIS — G89.4 CHRONIC PAIN SYNDROME: ICD-10-CM

## 2017-01-19 LAB
GLUCOSE SERPL-MCNC: 123 MG/DL (ref 65–140)
GLUCOSE SERPL-MCNC: 138 MG/DL (ref 65–140)

## 2017-01-19 PROCEDURE — 82948 REAGENT STRIP/BLOOD GLUCOSE: CPT

## 2017-01-19 PROCEDURE — C1772 INFUSION PUMP, PROGRAMMABLE: HCPCS | Performed by: ORTHOPAEDIC SURGERY

## 2017-01-19 DEVICE — INTRATHECAL PAIN PUMP 20ML: Type: IMPLANTABLE DEVICE | Status: FUNCTIONAL

## 2017-01-19 RX ORDER — ROCURONIUM BROMIDE 10 MG/ML
INJECTION, SOLUTION INTRAVENOUS AS NEEDED
Status: DISCONTINUED | OUTPATIENT
Start: 2017-01-19 | End: 2017-01-19 | Stop reason: SURG

## 2017-01-19 RX ORDER — MEPERIDINE HYDROCHLORIDE 50 MG/ML
12.5 INJECTION INTRAMUSCULAR; INTRAVENOUS; SUBCUTANEOUS AS NEEDED
Status: DISCONTINUED | OUTPATIENT
Start: 2017-01-19 | End: 2017-01-19 | Stop reason: HOSPADM

## 2017-01-19 RX ORDER — ONDANSETRON 2 MG/ML
4 INJECTION INTRAMUSCULAR; INTRAVENOUS ONCE
Status: DISCONTINUED | OUTPATIENT
Start: 2017-01-19 | End: 2017-01-19 | Stop reason: HOSPADM

## 2017-01-19 RX ORDER — BUPIVACAINE HYDROCHLORIDE AND EPINEPHRINE 2.5; 5 MG/ML; UG/ML
INJECTION, SOLUTION EPIDURAL; INFILTRATION; INTRACAUDAL; PERINEURAL AS NEEDED
Status: DISCONTINUED | OUTPATIENT
Start: 2017-01-19 | End: 2017-01-19 | Stop reason: HOSPADM

## 2017-01-19 RX ORDER — MIDAZOLAM HYDROCHLORIDE 1 MG/ML
INJECTION INTRAMUSCULAR; INTRAVENOUS AS NEEDED
Status: DISCONTINUED | OUTPATIENT
Start: 2017-01-19 | End: 2017-01-19 | Stop reason: SURG

## 2017-01-19 RX ORDER — HYDROCODONE BITARTRATE AND ACETAMINOPHEN 5; 325 MG/1; MG/1
2 TABLET ORAL EVERY 4 HOURS PRN
Status: CANCELLED | OUTPATIENT
Start: 2017-01-19

## 2017-01-19 RX ORDER — FENTANYL CITRATE/PF 50 MCG/ML
25 SYRINGE (ML) INJECTION
Status: DISCONTINUED | OUTPATIENT
Start: 2017-01-19 | End: 2017-01-19 | Stop reason: HOSPADM

## 2017-01-19 RX ORDER — GINSENG 100 MG
CAPSULE ORAL AS NEEDED
Status: DISCONTINUED | OUTPATIENT
Start: 2017-01-19 | End: 2017-01-19 | Stop reason: HOSPADM

## 2017-01-19 RX ORDER — CELECOXIB 100 MG/1
100 CAPSULE ORAL ONCE
Status: DISCONTINUED | OUTPATIENT
Start: 2017-01-19 | End: 2017-01-19 | Stop reason: CLARIF

## 2017-01-19 RX ORDER — ACETAMINOPHEN 325 MG/1
975 TABLET ORAL ONCE
Status: COMPLETED | OUTPATIENT
Start: 2017-01-19 | End: 2017-01-19

## 2017-01-19 RX ORDER — FENTANYL CITRATE 50 UG/ML
INJECTION, SOLUTION INTRAMUSCULAR; INTRAVENOUS AS NEEDED
Status: DISCONTINUED | OUTPATIENT
Start: 2017-01-19 | End: 2017-01-19 | Stop reason: SURG

## 2017-01-19 RX ORDER — GLYCOPYRROLATE 0.2 MG/ML
INJECTION INTRAMUSCULAR; INTRAVENOUS AS NEEDED
Status: DISCONTINUED | OUTPATIENT
Start: 2017-01-19 | End: 2017-01-19 | Stop reason: SURG

## 2017-01-19 RX ORDER — CELECOXIB 200 MG/1
200 CAPSULE ORAL ONCE
Status: COMPLETED | OUTPATIENT
Start: 2017-01-19 | End: 2017-01-19

## 2017-01-19 RX ORDER — SODIUM CHLORIDE 9 MG/ML
125 INJECTION, SOLUTION INTRAVENOUS CONTINUOUS
Status: DISCONTINUED | OUTPATIENT
Start: 2017-01-19 | End: 2017-01-19 | Stop reason: HOSPADM

## 2017-01-19 RX ORDER — PROPOFOL 10 MG/ML
INJECTION, EMULSION INTRAVENOUS AS NEEDED
Status: DISCONTINUED | OUTPATIENT
Start: 2017-01-19 | End: 2017-01-19 | Stop reason: SURG

## 2017-01-19 RX ORDER — LIDOCAINE HYDROCHLORIDE 10 MG/ML
INJECTION, SOLUTION INFILTRATION; PERINEURAL AS NEEDED
Status: DISCONTINUED | OUTPATIENT
Start: 2017-01-19 | End: 2017-01-19 | Stop reason: SURG

## 2017-01-19 RX ORDER — MORPHINE SULFATE 2 MG/ML
1 INJECTION, SOLUTION INTRAMUSCULAR; INTRAVENOUS
Status: CANCELLED | OUTPATIENT
Start: 2017-01-19

## 2017-01-19 RX ORDER — SODIUM CHLORIDE, SODIUM LACTATE, POTASSIUM CHLORIDE, CALCIUM CHLORIDE 600; 310; 30; 20 MG/100ML; MG/100ML; MG/100ML; MG/100ML
100 INJECTION, SOLUTION INTRAVENOUS CONTINUOUS
Status: DISCONTINUED | OUTPATIENT
Start: 2017-01-19 | End: 2017-01-19 | Stop reason: HOSPADM

## 2017-01-19 RX ORDER — ONDANSETRON 2 MG/ML
INJECTION INTRAMUSCULAR; INTRAVENOUS AS NEEDED
Status: DISCONTINUED | OUTPATIENT
Start: 2017-01-19 | End: 2017-01-19 | Stop reason: SURG

## 2017-01-19 RX ADMIN — ROCURONIUM BROMIDE 30 MG: 10 INJECTION, SOLUTION INTRAVENOUS at 08:58

## 2017-01-19 RX ADMIN — FENTANYL CITRATE 50 MCG: 50 INJECTION, SOLUTION INTRAMUSCULAR; INTRAVENOUS at 11:04

## 2017-01-19 RX ADMIN — GLYCOPYRROLATE 0.4 MG: 0.2 INJECTION, SOLUTION INTRAMUSCULAR; INTRAVENOUS at 10:28

## 2017-01-19 RX ADMIN — SODIUM CHLORIDE 125 ML/HR: 0.9 INJECTION, SOLUTION INTRAVENOUS at 08:47

## 2017-01-19 RX ADMIN — CELECOXIB 200 MG: 200 CAPSULE ORAL at 07:48

## 2017-01-19 RX ADMIN — PROPOFOL 200 MG: 10 INJECTION, EMULSION INTRAVENOUS at 08:58

## 2017-01-19 RX ADMIN — ACETAMINOPHEN 975 MG: 325 TABLET, FILM COATED ORAL at 07:48

## 2017-01-19 RX ADMIN — SODIUM CHLORIDE: 0.9 INJECTION, SOLUTION INTRAVENOUS at 10:14

## 2017-01-19 RX ADMIN — FENTANYL CITRATE 50 MCG: 50 INJECTION, SOLUTION INTRAMUSCULAR; INTRAVENOUS at 09:30

## 2017-01-19 RX ADMIN — ONDANSETRON HYDROCHLORIDE 8 MG: 2 INJECTION, SOLUTION INTRAVENOUS at 09:18

## 2017-01-19 RX ADMIN — LIDOCAINE HYDROCHLORIDE 60 MG: 10 INJECTION, SOLUTION INFILTRATION; PERINEURAL at 08:58

## 2017-01-19 RX ADMIN — FENTANYL CITRATE 100 MCG: 50 INJECTION, SOLUTION INTRAMUSCULAR; INTRAVENOUS at 08:58

## 2017-01-19 RX ADMIN — METOPROLOL TARTRATE 1.5 MG: 5 INJECTION, SOLUTION INTRAVENOUS at 10:04

## 2017-01-19 RX ADMIN — FENTANYL CITRATE 50 MCG: 50 INJECTION, SOLUTION INTRAMUSCULAR; INTRAVENOUS at 11:10

## 2017-01-19 RX ADMIN — METOPROLOL TARTRATE 1 MG: 5 INJECTION, SOLUTION INTRAVENOUS at 09:16

## 2017-01-19 RX ADMIN — FENTANYL CITRATE 25 MCG: 50 INJECTION INTRAMUSCULAR; INTRAVENOUS at 11:58

## 2017-01-19 RX ADMIN — FENTANYL CITRATE 25 MCG: 50 INJECTION INTRAMUSCULAR; INTRAVENOUS at 11:51

## 2017-01-19 RX ADMIN — MIDAZOLAM HYDROCHLORIDE 2 MG: 1 INJECTION, SOLUTION INTRAMUSCULAR; INTRAVENOUS at 08:48

## 2017-01-19 RX ADMIN — Medication 3000 MG: at 09:16

## 2017-01-19 RX ADMIN — NEOSTIGMINE METHYLSULFATE 3 MG: 1 INJECTION INTRAMUSCULAR; INTRAVENOUS; SUBCUTANEOUS at 10:28

## 2017-01-30 ENCOUNTER — ALLSCRIPTS OFFICE VISIT (OUTPATIENT)
Dept: OTHER | Facility: OTHER | Age: 57
End: 2017-01-30

## 2017-02-22 ENCOUNTER — ALLSCRIPTS OFFICE VISIT (OUTPATIENT)
Dept: OTHER | Facility: OTHER | Age: 57
End: 2017-02-22

## 2017-02-22 ENCOUNTER — TRANSCRIBE ORDERS (OUTPATIENT)
Dept: ADMINISTRATIVE | Facility: HOSPITAL | Age: 57
End: 2017-02-22

## 2017-02-22 DIAGNOSIS — G31.84 MCI (MILD COGNITIVE IMPAIRMENT) WITH MEMORY LOSS: Primary | ICD-10-CM

## 2017-02-22 DIAGNOSIS — G31.84 MILD COGNITIVE IMPAIRMENT: ICD-10-CM

## 2017-02-22 DIAGNOSIS — I63.9 CEREBRAL INFARCTION (HCC): ICD-10-CM

## 2017-02-22 DIAGNOSIS — E55.9 VITAMIN D DEFICIENCY: ICD-10-CM

## 2017-02-26 ENCOUNTER — ANESTHESIA EVENT (OUTPATIENT)
Dept: GASTROENTEROLOGY | Facility: HOSPITAL | Age: 57
End: 2017-02-26
Payer: MEDICARE

## 2017-02-27 ENCOUNTER — GENERIC CONVERSION - ENCOUNTER (OUTPATIENT)
Dept: OTHER | Facility: OTHER | Age: 57
End: 2017-02-27

## 2017-02-27 ENCOUNTER — TRANSCRIBE ORDERS (OUTPATIENT)
Dept: ADMINISTRATIVE | Facility: HOSPITAL | Age: 57
End: 2017-02-27

## 2017-02-27 ENCOUNTER — ANESTHESIA (OUTPATIENT)
Dept: GASTROENTEROLOGY | Facility: HOSPITAL | Age: 57
End: 2017-02-27
Payer: MEDICARE

## 2017-02-27 ENCOUNTER — HOSPITAL ENCOUNTER (OUTPATIENT)
Facility: HOSPITAL | Age: 57
Setting detail: OUTPATIENT SURGERY
Discharge: HOME | End: 2017-02-27
Attending: INTERNAL MEDICINE | Admitting: INTERNAL MEDICINE
Payer: MEDICARE

## 2017-02-27 VITALS
TEMPERATURE: 99.6 F | SYSTOLIC BLOOD PRESSURE: 120 MMHG | HEIGHT: 74 IN | BODY MASS INDEX: 40.43 KG/M2 | RESPIRATION RATE: 18 BRPM | HEART RATE: 69 BPM | OXYGEN SATURATION: 93 % | WEIGHT: 315 LBS | DIASTOLIC BLOOD PRESSURE: 59 MMHG

## 2017-02-27 DIAGNOSIS — K44.9 DIAPHRAGMATIC HERNIA WITHOUT OBSTRUCTION OR GANGRENE: ICD-10-CM

## 2017-02-27 DIAGNOSIS — R13.10 DYSPHAGIA: ICD-10-CM

## 2017-02-27 DIAGNOSIS — M42.06: Primary | ICD-10-CM

## 2017-02-27 DIAGNOSIS — K21.9 GASTRO-ESOPHAGEAL REFLUX DISEASE WITHOUT ESOPHAGITIS: ICD-10-CM

## 2017-02-27 LAB — GLUCOSE SERPL-MCNC: 165 MG/DL (ref 65–140)

## 2017-02-27 PROCEDURE — 82948 REAGENT STRIP/BLOOD GLUCOSE: CPT

## 2017-02-27 PROCEDURE — 88342 IMHCHEM/IMCYTCHM 1ST ANTB: CPT | Performed by: INTERNAL MEDICINE

## 2017-02-27 PROCEDURE — 88305 TISSUE EXAM BY PATHOLOGIST: CPT | Performed by: INTERNAL MEDICINE

## 2017-02-27 RX ORDER — PROPOFOL 10 MG/ML
INJECTION, EMULSION INTRAVENOUS CONTINUOUS PRN
Status: DISCONTINUED | OUTPATIENT
Start: 2017-02-27 | End: 2017-02-27 | Stop reason: SURG

## 2017-02-27 RX ORDER — KETAMINE HYDROCHLORIDE 100 MG/ML
INJECTION, SOLUTION INTRAMUSCULAR; INTRAVENOUS AS NEEDED
Status: DISCONTINUED | OUTPATIENT
Start: 2017-02-27 | End: 2017-02-27 | Stop reason: SURG

## 2017-02-27 RX ORDER — PROPOFOL 10 MG/ML
INJECTION, EMULSION INTRAVENOUS AS NEEDED
Status: DISCONTINUED | OUTPATIENT
Start: 2017-02-27 | End: 2017-02-27 | Stop reason: SURG

## 2017-02-27 RX ORDER — SODIUM CHLORIDE 9 MG/ML
100 INJECTION, SOLUTION INTRAVENOUS CONTINUOUS
Status: DISCONTINUED | OUTPATIENT
Start: 2017-02-27 | End: 2017-02-27 | Stop reason: HOSPADM

## 2017-02-27 RX ORDER — LIDOCAINE HYDROCHLORIDE 10 MG/ML
INJECTION, SOLUTION INFILTRATION; PERINEURAL AS NEEDED
Status: DISCONTINUED | OUTPATIENT
Start: 2017-02-27 | End: 2017-02-27 | Stop reason: SURG

## 2017-02-27 RX ADMIN — KETAMINE HYDROCHLORIDE 30 MG: 100 INJECTION, SOLUTION INTRAMUSCULAR; INTRAVENOUS at 10:39

## 2017-02-27 RX ADMIN — PROPOFOL 100 MG: 10 INJECTION, EMULSION INTRAVENOUS at 10:39

## 2017-02-27 RX ADMIN — PROPOFOL 150 MCG/KG/MIN: 10 INJECTION, EMULSION INTRAVENOUS at 10:39

## 2017-02-27 RX ADMIN — LIDOCAINE HYDROCHLORIDE 100 MG: 10 INJECTION, SOLUTION INFILTRATION; PERINEURAL at 10:39

## 2017-02-27 RX ADMIN — SODIUM CHLORIDE 100 ML/HR: 0.9 INJECTION, SOLUTION INTRAVENOUS at 09:14

## 2017-03-02 ENCOUNTER — GENERIC CONVERSION - ENCOUNTER (OUTPATIENT)
Dept: OTHER | Facility: OTHER | Age: 57
End: 2017-03-02

## 2017-03-08 ENCOUNTER — APPOINTMENT (OUTPATIENT)
Dept: LAB | Facility: HOSPITAL | Age: 57
End: 2017-03-08
Attending: PSYCHIATRY & NEUROLOGY
Payer: MEDICARE

## 2017-03-08 DIAGNOSIS — I63.9 CEREBRAL INFARCTION (HCC): ICD-10-CM

## 2017-03-08 DIAGNOSIS — G31.84 MILD COGNITIVE IMPAIRMENT: ICD-10-CM

## 2017-03-08 DIAGNOSIS — E55.9 VITAMIN D DEFICIENCY: ICD-10-CM

## 2017-03-08 LAB
25(OH)D3 SERPL-MCNC: 4.8 NG/ML (ref 30–100)
BUN SERPL-MCNC: 19 MG/DL (ref 5–25)
CREAT SERPL-MCNC: 1.43 MG/DL (ref 0.6–1.3)
GFR SERPL CREATININE-BSD FRML MDRD: 51.5 ML/MIN/1.73SQ M
HCYS SERPL-SCNC: 9.2 UMOL/L (ref 5.3–14.2)
TSH SERPL DL<=0.05 MIU/L-ACNC: 3.6 UIU/ML (ref 0.36–3.74)
VIT B12 SERPL-MCNC: 576 PG/ML (ref 100–900)

## 2017-03-08 PROCEDURE — 82306 VITAMIN D 25 HYDROXY: CPT

## 2017-03-08 PROCEDURE — 36415 COLL VENOUS BLD VENIPUNCTURE: CPT

## 2017-03-08 PROCEDURE — 84520 ASSAY OF UREA NITROGEN: CPT

## 2017-03-08 PROCEDURE — 82565 ASSAY OF CREATININE: CPT

## 2017-03-08 PROCEDURE — 84443 ASSAY THYROID STIM HORMONE: CPT

## 2017-03-08 PROCEDURE — 82607 VITAMIN B-12: CPT

## 2017-03-08 PROCEDURE — 83090 ASSAY OF HOMOCYSTEINE: CPT

## 2017-03-10 ENCOUNTER — HOSPITAL ENCOUNTER (OUTPATIENT)
Dept: RADIOLOGY | Facility: HOSPITAL | Age: 57
Discharge: HOME | End: 2017-03-10
Payer: MEDICARE

## 2017-03-10 ENCOUNTER — HOSPITAL ENCOUNTER (OUTPATIENT)
Dept: RADIOLOGY | Facility: HOSPITAL | Age: 57
Discharge: HOME | End: 2017-03-10
Attending: PSYCHIATRY & NEUROLOGY
Payer: MEDICARE

## 2017-03-10 DIAGNOSIS — M42.06: ICD-10-CM

## 2017-03-10 DIAGNOSIS — G31.84 MILD COGNITIVE IMPAIRMENT: ICD-10-CM

## 2017-03-10 DIAGNOSIS — I63.9 CEREBRAL INFARCTION (HCC): ICD-10-CM

## 2017-03-10 PROCEDURE — 72148 MRI LUMBAR SPINE W/O DYE: CPT

## 2017-03-10 PROCEDURE — 70553 MRI BRAIN STEM W/O & W/DYE: CPT

## 2017-03-10 PROCEDURE — A9585 GADOBUTROL INJECTION: HCPCS | Performed by: PSYCHIATRY & NEUROLOGY

## 2017-03-10 RX ADMIN — GADOBUTROL 16 ML: 604.72 INJECTION INTRAVENOUS at 14:41

## 2017-03-13 ENCOUNTER — ALLSCRIPTS OFFICE VISIT (OUTPATIENT)
Dept: OTHER | Facility: OTHER | Age: 57
End: 2017-03-13

## 2017-04-19 ENCOUNTER — ALLSCRIPTS OFFICE VISIT (OUTPATIENT)
Dept: OTHER | Facility: OTHER | Age: 57
End: 2017-04-19

## 2017-04-26 ENCOUNTER — ALLSCRIPTS OFFICE VISIT (OUTPATIENT)
Dept: OTHER | Facility: OTHER | Age: 57
End: 2017-04-26

## 2017-05-10 ENCOUNTER — GENERIC CONVERSION - ENCOUNTER (OUTPATIENT)
Dept: OTHER | Facility: OTHER | Age: 57
End: 2017-05-10

## 2017-05-10 ENCOUNTER — HOSPITAL ENCOUNTER (INPATIENT)
Facility: HOSPITAL | Age: 57
LOS: 3 days | Discharge: HOME | DRG: 191 | End: 2017-05-13
Attending: EMERGENCY MEDICINE | Admitting: INTERNAL MEDICINE
Payer: MEDICARE

## 2017-05-10 ENCOUNTER — APPOINTMENT (EMERGENCY)
Dept: RADIOLOGY | Facility: HOSPITAL | Age: 57
DRG: 191 | End: 2017-05-10
Payer: MEDICARE

## 2017-05-10 DIAGNOSIS — Z95.5 STENTED CORONARY ARTERY: ICD-10-CM

## 2017-05-10 DIAGNOSIS — E87.6 HYPOKALEMIA: ICD-10-CM

## 2017-05-10 DIAGNOSIS — R06.00 ACUTE DYSPNEA: Primary | ICD-10-CM

## 2017-05-10 DIAGNOSIS — J20.9 ACUTE TRACHEOBRONCHITIS: ICD-10-CM

## 2017-05-10 DIAGNOSIS — R07.9 CHEST PAIN IN ADULT: ICD-10-CM

## 2017-05-10 DIAGNOSIS — G47.33 OBSTRUCTIVE SLEEP APNEA SYNDROME: ICD-10-CM

## 2017-05-10 DIAGNOSIS — I21.9 MI (MYOCARDIAL INFARCTION) (HCC): ICD-10-CM

## 2017-05-10 LAB
ALBUMIN SERPL BCP-MCNC: 2.8 G/DL (ref 3.5–5)
ALP SERPL-CCNC: 157 U/L (ref 46–116)
ALT SERPL W P-5'-P-CCNC: 30 U/L (ref 12–78)
ANION GAP SERPL CALCULATED.3IONS-SCNC: 5 MMOL/L (ref 4–13)
AST SERPL W P-5'-P-CCNC: 16 U/L (ref 5–45)
ATRIAL RATE: 47 BPM
BASOPHILS # BLD AUTO: 0.02 THOUSANDS/ΜL (ref 0–0.1)
BASOPHILS NFR BLD AUTO: 0 % (ref 0–1)
BILIRUB SERPL-MCNC: 0.44 MG/DL (ref 0.2–1)
BUN SERPL-MCNC: 14 MG/DL (ref 5–25)
CALCIUM SERPL-MCNC: 8.4 MG/DL (ref 8.3–10.1)
CHLORIDE SERPL-SCNC: 102 MMOL/L (ref 100–108)
CO2 SERPL-SCNC: 34 MMOL/L (ref 21–32)
CREAT SERPL-MCNC: 1.51 MG/DL (ref 0.6–1.3)
EOSINOPHIL # BLD AUTO: 0.29 THOUSAND/ΜL (ref 0–0.61)
EOSINOPHIL NFR BLD AUTO: 3 % (ref 0–6)
ERYTHROCYTE [DISTWIDTH] IN BLOOD BY AUTOMATED COUNT: 17 % (ref 11.6–15.1)
GFR SERPL CREATININE-BSD FRML MDRD: 48.4 ML/MIN/1.73SQ M
GLUCOSE SERPL-MCNC: 106 MG/DL (ref 65–140)
GLUCOSE SERPL-MCNC: 151 MG/DL (ref 65–140)
GLUCOSE SERPL-MCNC: 166 MG/DL (ref 65–140)
GLUCOSE SERPL-MCNC: 170 MG/DL (ref 65–140)
HCT VFR BLD AUTO: 41.9 % (ref 36.5–49.3)
HGB BLD-MCNC: 13.1 G/DL (ref 12–17)
LYMPHOCYTES # BLD AUTO: 1.07 THOUSANDS/ΜL (ref 0.6–4.47)
LYMPHOCYTES NFR BLD AUTO: 10 % (ref 14–44)
MCH RBC QN AUTO: 28 PG (ref 26.8–34.3)
MCHC RBC AUTO-ENTMCNC: 31.3 G/DL (ref 31.4–37.4)
MCV RBC AUTO: 90 FL (ref 82–98)
MONOCYTES # BLD AUTO: 0.79 THOUSAND/ΜL (ref 0.17–1.22)
MONOCYTES NFR BLD AUTO: 7 % (ref 4–12)
NEUTROPHILS # BLD AUTO: 9.12 THOUSANDS/ΜL (ref 1.85–7.62)
NEUTS SEG NFR BLD AUTO: 80 % (ref 43–75)
NRBC BLD AUTO-RTO: 0 /100 WBCS
NT-PROBNP SERPL-MCNC: 300 PG/ML
P AXIS: 49 DEGREES
PLATELET # BLD AUTO: 256 THOUSANDS/UL (ref 149–390)
PMV BLD AUTO: 9.7 FL (ref 8.9–12.7)
POTASSIUM SERPL-SCNC: 2.9 MMOL/L (ref 3.5–5.3)
PR INTERVAL: 168 MS
PROT SERPL-MCNC: 7.1 G/DL (ref 6.4–8.2)
QRS AXIS: 23 DEGREES
QRSD INTERVAL: 92 MS
QT INTERVAL: 468 MS
QTC INTERVAL: 497 MS
RBC # BLD AUTO: 4.68 MILLION/UL (ref 3.88–5.62)
SODIUM SERPL-SCNC: 141 MMOL/L (ref 136–145)
SPECIMEN SOURCE: NORMAL
T WAVE AXIS: 18 DEGREES
TROPONIN I BLD-MCNC: 0 NG/ML (ref 0–0.08)
VENTRICULAR RATE: 68 BPM
WBC # BLD AUTO: 11.29 THOUSAND/UL (ref 4.31–10.16)

## 2017-05-10 PROCEDURE — 85025 COMPLETE CBC W/AUTO DIFF WBC: CPT | Performed by: EMERGENCY MEDICINE

## 2017-05-10 PROCEDURE — 93005 ELECTROCARDIOGRAM TRACING: CPT | Performed by: EMERGENCY MEDICINE

## 2017-05-10 PROCEDURE — 71020 HB CHEST X-RAY 2VW FRONTAL&LATL: CPT

## 2017-05-10 PROCEDURE — 94640 AIRWAY INHALATION TREATMENT: CPT

## 2017-05-10 PROCEDURE — 82948 REAGENT STRIP/BLOOD GLUCOSE: CPT

## 2017-05-10 PROCEDURE — 94760 N-INVAS EAR/PLS OXIMETRY 1: CPT

## 2017-05-10 PROCEDURE — 94660 CPAP INITIATION&MGMT: CPT

## 2017-05-10 PROCEDURE — 36415 COLL VENOUS BLD VENIPUNCTURE: CPT | Performed by: EMERGENCY MEDICINE

## 2017-05-10 PROCEDURE — 99285 EMERGENCY DEPT VISIT HI MDM: CPT

## 2017-05-10 PROCEDURE — 80053 COMPREHEN METABOLIC PANEL: CPT | Performed by: EMERGENCY MEDICINE

## 2017-05-10 PROCEDURE — 83880 ASSAY OF NATRIURETIC PEPTIDE: CPT | Performed by: EMERGENCY MEDICINE

## 2017-05-10 PROCEDURE — 84484 ASSAY OF TROPONIN QUANT: CPT

## 2017-05-10 RX ORDER — POTASSIUM CHLORIDE 20 MEQ/1
40 TABLET, EXTENDED RELEASE ORAL ONCE
Status: COMPLETED | OUTPATIENT
Start: 2017-05-10 | End: 2017-05-10

## 2017-05-10 RX ORDER — ONDANSETRON 2 MG/ML
4 INJECTION INTRAMUSCULAR; INTRAVENOUS EVERY 6 HOURS PRN
Status: DISCONTINUED | OUTPATIENT
Start: 2017-05-10 | End: 2017-05-13 | Stop reason: HOSPADM

## 2017-05-10 RX ORDER — CLOPIDOGREL BISULFATE 75 MG/1
75 TABLET ORAL DAILY
Status: DISCONTINUED | OUTPATIENT
Start: 2017-05-11 | End: 2017-05-13 | Stop reason: HOSPADM

## 2017-05-10 RX ORDER — OXYCODONE HYDROCHLORIDE 5 MG/1
30 TABLET ORAL EVERY 4 HOURS PRN
Status: DISCONTINUED | OUTPATIENT
Start: 2017-05-10 | End: 2017-05-13 | Stop reason: HOSPADM

## 2017-05-10 RX ORDER — RANOLAZINE 500 MG/1
1000 TABLET, EXTENDED RELEASE ORAL 2 TIMES DAILY
Status: DISCONTINUED | OUTPATIENT
Start: 2017-05-10 | End: 2017-05-13 | Stop reason: HOSPADM

## 2017-05-10 RX ORDER — TOPIRAMATE 25 MG/1
100 TABLET ORAL 2 TIMES DAILY
Status: DISCONTINUED | OUTPATIENT
Start: 2017-05-10 | End: 2017-05-13 | Stop reason: HOSPADM

## 2017-05-10 RX ORDER — CITALOPRAM 20 MG/1
40 TABLET ORAL DAILY
Status: DISCONTINUED | OUTPATIENT
Start: 2017-05-11 | End: 2017-05-13 | Stop reason: HOSPADM

## 2017-05-10 RX ORDER — TORSEMIDE 100 MG/1
100 TABLET ORAL 2 TIMES DAILY
Status: DISCONTINUED | OUTPATIENT
Start: 2017-05-10 | End: 2017-05-13 | Stop reason: HOSPADM

## 2017-05-10 RX ORDER — PANTOPRAZOLE SODIUM 20 MG/1
20 TABLET, DELAYED RELEASE ORAL
Status: DISCONTINUED | OUTPATIENT
Start: 2017-05-11 | End: 2017-05-13 | Stop reason: HOSPADM

## 2017-05-10 RX ORDER — ALBUTEROL SULFATE 2.5 MG/3ML
5 SOLUTION RESPIRATORY (INHALATION) ONCE
Status: COMPLETED | OUTPATIENT
Start: 2017-05-10 | End: 2017-05-10

## 2017-05-10 RX ORDER — LEVALBUTEROL 1.25 MG/.5ML
1.25 SOLUTION, CONCENTRATE RESPIRATORY (INHALATION) EVERY 6 HOURS PRN
Status: DISCONTINUED | OUTPATIENT
Start: 2017-05-10 | End: 2017-05-11

## 2017-05-10 RX ORDER — POLYETHYLENE GLYCOL 3350 17 G/17G
17 POWDER, FOR SOLUTION ORAL DAILY
Status: DISCONTINUED | OUTPATIENT
Start: 2017-05-11 | End: 2017-05-13 | Stop reason: HOSPADM

## 2017-05-10 RX ORDER — POTASSIUM CHLORIDE 750 MG/1
10 TABLET, EXTENDED RELEASE ORAL 3 TIMES DAILY
Status: DISCONTINUED | OUTPATIENT
Start: 2017-05-10 | End: 2017-05-11

## 2017-05-10 RX ORDER — INSULIN GLARGINE 100 [IU]/ML
100 INJECTION, SOLUTION SUBCUTANEOUS
Status: DISCONTINUED | OUTPATIENT
Start: 2017-05-10 | End: 2017-05-13 | Stop reason: HOSPADM

## 2017-05-10 RX ORDER — GABAPENTIN 400 MG/1
800 CAPSULE ORAL 2 TIMES DAILY
Status: DISCONTINUED | OUTPATIENT
Start: 2017-05-10 | End: 2017-05-12

## 2017-05-10 RX ORDER — ZOLPIDEM TARTRATE 5 MG/1
10 TABLET ORAL
Status: DISCONTINUED | OUTPATIENT
Start: 2017-05-10 | End: 2017-05-13 | Stop reason: HOSPADM

## 2017-05-10 RX ORDER — CALCIUM CARBONATE 200(500)MG
1000 TABLET,CHEWABLE ORAL DAILY PRN
Status: DISCONTINUED | OUTPATIENT
Start: 2017-05-10 | End: 2017-05-13 | Stop reason: HOSPADM

## 2017-05-10 RX ORDER — DILTIAZEM HYDROCHLORIDE 5 MG/ML
10 INJECTION INTRAVENOUS EVERY 6 HOURS PRN
Status: DISCONTINUED | OUTPATIENT
Start: 2017-05-10 | End: 2017-05-13 | Stop reason: HOSPADM

## 2017-05-10 RX ORDER — NEBIVOLOL 10 MG/1
10 TABLET ORAL DAILY
Status: DISCONTINUED | OUTPATIENT
Start: 2017-05-11 | End: 2017-05-13 | Stop reason: HOSPADM

## 2017-05-10 RX ORDER — FLUTICASONE PROPIONATE 110 UG/1
2 AEROSOL, METERED RESPIRATORY (INHALATION) EVERY 12 HOURS SCHEDULED
Status: DISCONTINUED | OUTPATIENT
Start: 2017-05-10 | End: 2017-05-13 | Stop reason: HOSPADM

## 2017-05-10 RX ORDER — TIZANIDINE 2 MG/1
2 TABLET ORAL 3 TIMES DAILY
Status: DISCONTINUED | OUTPATIENT
Start: 2017-05-10 | End: 2017-05-13 | Stop reason: HOSPADM

## 2017-05-10 RX ORDER — TAMSULOSIN HYDROCHLORIDE 0.4 MG/1
0.4 CAPSULE ORAL
Status: DISCONTINUED | OUTPATIENT
Start: 2017-05-10 | End: 2017-05-13 | Stop reason: HOSPADM

## 2017-05-10 RX ORDER — MAGNESIUM HYDROXIDE/ALUMINUM HYDROXICE/SIMETHICONE 120; 1200; 1200 MG/30ML; MG/30ML; MG/30ML
30 SUSPENSION ORAL EVERY 6 HOURS PRN
Status: DISCONTINUED | OUTPATIENT
Start: 2017-05-10 | End: 2017-05-13 | Stop reason: HOSPADM

## 2017-05-10 RX ORDER — DOXYCYCLINE HYCLATE 100 MG/1
100 CAPSULE ORAL EVERY 12 HOURS SCHEDULED
Status: DISCONTINUED | OUTPATIENT
Start: 2017-05-10 | End: 2017-05-13 | Stop reason: HOSPADM

## 2017-05-10 RX ORDER — TRAZODONE HYDROCHLORIDE 100 MG/1
100 TABLET ORAL
Status: DISCONTINUED | OUTPATIENT
Start: 2017-05-10 | End: 2017-05-13 | Stop reason: HOSPADM

## 2017-05-10 RX ORDER — ASPIRIN 81 MG/1
81 TABLET, CHEWABLE ORAL DAILY
Status: DISCONTINUED | OUTPATIENT
Start: 2017-05-11 | End: 2017-05-13 | Stop reason: HOSPADM

## 2017-05-10 RX ORDER — NITROGLYCERIN 0.4 MG/1
0.4 TABLET SUBLINGUAL
Status: DISCONTINUED | OUTPATIENT
Start: 2017-05-10 | End: 2017-05-13 | Stop reason: HOSPADM

## 2017-05-10 RX ORDER — ACETAMINOPHEN 325 MG/1
650 TABLET ORAL EVERY 6 HOURS PRN
Status: DISCONTINUED | OUTPATIENT
Start: 2017-05-10 | End: 2017-05-13 | Stop reason: HOSPADM

## 2017-05-10 RX ORDER — ATORVASTATIN CALCIUM 40 MG/1
40 TABLET, FILM COATED ORAL
Status: DISCONTINUED | OUTPATIENT
Start: 2017-05-10 | End: 2017-05-13 | Stop reason: HOSPADM

## 2017-05-10 RX ORDER — BACLOFEN 10 MG/1
10 TABLET ORAL 3 TIMES DAILY PRN
Status: DISCONTINUED | OUTPATIENT
Start: 2017-05-10 | End: 2017-05-13 | Stop reason: HOSPADM

## 2017-05-10 RX ADMIN — DOXYCYCLINE 100 MG: 100 CAPSULE ORAL at 20:04

## 2017-05-10 RX ADMIN — RANOLAZINE 1000 MG: 500 TABLET, FILM COATED, EXTENDED RELEASE ORAL at 22:03

## 2017-05-10 RX ADMIN — ZOLPIDEM TARTRATE 10 MG: 5 TABLET, FILM COATED ORAL at 23:12

## 2017-05-10 RX ADMIN — MORPHINE SULFATE 105 MG: 30 TABLET, EXTENDED RELEASE ORAL at 21:59

## 2017-05-10 RX ADMIN — ATORVASTATIN CALCIUM 40 MG: 40 TABLET, FILM COATED ORAL at 16:45

## 2017-05-10 RX ADMIN — OXYCODONE HYDROCHLORIDE 30 MG: 5 TABLET ORAL at 23:13

## 2017-05-10 RX ADMIN — BACLOFEN 10 MG: 10 TABLET ORAL at 17:39

## 2017-05-10 RX ADMIN — POTASSIUM CHLORIDE 10 MEQ: 750 TABLET, EXTENDED RELEASE ORAL at 23:14

## 2017-05-10 RX ADMIN — POTASSIUM CHLORIDE 40 MEQ: 1500 TABLET, EXTENDED RELEASE ORAL at 16:39

## 2017-05-10 RX ADMIN — TOPIRAMATE 100 MG: 25 TABLET, FILM COATED ORAL at 22:04

## 2017-05-10 RX ADMIN — GABAPENTIN 800 MG: 400 CAPSULE ORAL at 17:24

## 2017-05-10 RX ADMIN — TRAZODONE HYDROCHLORIDE 100 MG: 100 TABLET ORAL at 23:14

## 2017-05-10 RX ADMIN — TIZANIDINE 2 MG: 2 TABLET ORAL at 18:46

## 2017-05-10 RX ADMIN — IPRATROPIUM BROMIDE 0.5 MG: 0.5 SOLUTION RESPIRATORY (INHALATION) at 12:14

## 2017-05-10 RX ADMIN — POTASSIUM CHLORIDE 40 MEQ: 1500 TABLET, EXTENDED RELEASE ORAL at 14:30

## 2017-05-10 RX ADMIN — FLUTICASONE PROPIONATE 2 PUFF: 110 AEROSOL, METERED RESPIRATORY (INHALATION) at 20:05

## 2017-05-10 RX ADMIN — MORPHINE SULFATE 105 MG: 30 TABLET, EXTENDED RELEASE ORAL at 16:43

## 2017-05-10 RX ADMIN — INSULIN LISPRO 2 UNITS: 100 INJECTION, SOLUTION INTRAVENOUS; SUBCUTANEOUS at 22:06

## 2017-05-10 RX ADMIN — INSULIN GLARGINE 100 UNITS: 100 INJECTION, SOLUTION SUBCUTANEOUS at 22:01

## 2017-05-10 RX ADMIN — TORSEMIDE 100 MG: 100 TABLET ORAL at 18:48

## 2017-05-10 RX ADMIN — POTASSIUM CHLORIDE 10 MEQ: 750 TABLET, EXTENDED RELEASE ORAL at 17:24

## 2017-05-10 RX ADMIN — INSULIN LISPRO 20 UNITS: 100 INJECTION, SOLUTION INTRAVENOUS; SUBCUTANEOUS at 18:44

## 2017-05-10 RX ADMIN — INSULIN LISPRO 2 UNITS: 100 INJECTION, SOLUTION INTRAVENOUS; SUBCUTANEOUS at 18:44

## 2017-05-10 RX ADMIN — TIZANIDINE 2 MG: 2 TABLET ORAL at 23:15

## 2017-05-10 RX ADMIN — OXYCODONE HYDROCHLORIDE 30 MG: 5 TABLET ORAL at 17:38

## 2017-05-10 RX ADMIN — ALBUTEROL SULFATE 5 MG: 2.5 SOLUTION RESPIRATORY (INHALATION) at 12:14

## 2017-05-10 RX ADMIN — FLUTICASONE PROPIONATE AND SALMETEROL 1 PUFF: 50; 250 POWDER RESPIRATORY (INHALATION) at 18:51

## 2017-05-10 RX ADMIN — TAMSULOSIN HYDROCHLORIDE 0.4 MG: 0.4 CAPSULE ORAL at 16:45

## 2017-05-10 RX ADMIN — LUBIPROSTONE 24 MCG: 24 CAPSULE, GELATIN COATED ORAL at 18:47

## 2017-05-10 RX ADMIN — IPRATROPIUM BROMIDE 0.5 MG: 0.5 SOLUTION RESPIRATORY (INHALATION) at 16:57

## 2017-05-11 LAB
ANION GAP SERPL CALCULATED.3IONS-SCNC: 10 MMOL/L (ref 4–13)
BUN SERPL-MCNC: 14 MG/DL (ref 5–25)
CALCIUM SERPL-MCNC: 8.3 MG/DL (ref 8.3–10.1)
CHLORIDE SERPL-SCNC: 103 MMOL/L (ref 100–108)
CO2 SERPL-SCNC: 28 MMOL/L (ref 21–32)
CREAT SERPL-MCNC: 1.35 MG/DL (ref 0.6–1.3)
ERYTHROCYTE [DISTWIDTH] IN BLOOD BY AUTOMATED COUNT: 17.2 % (ref 11.6–15.1)
EST. AVERAGE GLUCOSE BLD GHB EST-MCNC: 154 MG/DL
GFR SERPL CREATININE-BSD FRML MDRD: 55.1 ML/MIN/1.73SQ M
GLUCOSE SERPL-MCNC: 139 MG/DL (ref 65–140)
GLUCOSE SERPL-MCNC: 143 MG/DL (ref 65–140)
GLUCOSE SERPL-MCNC: 159 MG/DL (ref 65–140)
GLUCOSE SERPL-MCNC: 226 MG/DL (ref 65–140)
GLUCOSE SERPL-MCNC: 288 MG/DL (ref 65–140)
HBA1C MFR BLD: 7 % (ref 4.2–6.3)
HCT VFR BLD AUTO: 39.9 % (ref 36.5–49.3)
HGB BLD-MCNC: 12.4 G/DL (ref 12–17)
L PNEUMO1 AG UR QL IA.RAPID: NEGATIVE
MCH RBC QN AUTO: 27.9 PG (ref 26.8–34.3)
MCHC RBC AUTO-ENTMCNC: 31.1 G/DL (ref 31.4–37.4)
MCV RBC AUTO: 90 FL (ref 82–98)
PLATELET # BLD AUTO: 264 THOUSANDS/UL (ref 149–390)
PMV BLD AUTO: 10.3 FL (ref 8.9–12.7)
POTASSIUM SERPL-SCNC: 2.9 MMOL/L (ref 3.5–5.3)
RBC # BLD AUTO: 4.45 MILLION/UL (ref 3.88–5.62)
S PNEUM AG UR QL: NEGATIVE
SODIUM SERPL-SCNC: 141 MMOL/L (ref 136–145)
TROPONIN I SERPL-MCNC: <0.02 NG/ML
WBC # BLD AUTO: 10.61 THOUSAND/UL (ref 4.31–10.16)

## 2017-05-11 PROCEDURE — 94640 AIRWAY INHALATION TREATMENT: CPT

## 2017-05-11 PROCEDURE — 80048 BASIC METABOLIC PNL TOTAL CA: CPT | Performed by: PHYSICIAN ASSISTANT

## 2017-05-11 PROCEDURE — 84484 ASSAY OF TROPONIN QUANT: CPT | Performed by: NURSE PRACTITIONER

## 2017-05-11 PROCEDURE — 83036 HEMOGLOBIN GLYCOSYLATED A1C: CPT | Performed by: INTERNAL MEDICINE

## 2017-05-11 PROCEDURE — 94760 N-INVAS EAR/PLS OXIMETRY 1: CPT

## 2017-05-11 PROCEDURE — 94660 CPAP INITIATION&MGMT: CPT

## 2017-05-11 PROCEDURE — 85027 COMPLETE CBC AUTOMATED: CPT | Performed by: PHYSICIAN ASSISTANT

## 2017-05-11 PROCEDURE — 82948 REAGENT STRIP/BLOOD GLUCOSE: CPT

## 2017-05-11 PROCEDURE — 87449 NOS EACH ORGANISM AG IA: CPT | Performed by: NURSE PRACTITIONER

## 2017-05-11 PROCEDURE — 93005 ELECTROCARDIOGRAM TRACING: CPT

## 2017-05-11 RX ORDER — LEVALBUTEROL 1.25 MG/.5ML
0.63 SOLUTION, CONCENTRATE RESPIRATORY (INHALATION) EVERY 6 HOURS
Status: DISCONTINUED | OUTPATIENT
Start: 2017-05-11 | End: 2017-05-12

## 2017-05-11 RX ORDER — LEVALBUTEROL INHALATION SOLUTION 0.63 MG/3ML
SOLUTION RESPIRATORY (INHALATION)
Status: COMPLETED
Start: 2017-05-11 | End: 2017-05-11

## 2017-05-11 RX ORDER — METHYLPREDNISOLONE SODIUM SUCCINATE 40 MG/ML
40 INJECTION, POWDER, LYOPHILIZED, FOR SOLUTION INTRAMUSCULAR; INTRAVENOUS EVERY 8 HOURS SCHEDULED
Status: DISCONTINUED | OUTPATIENT
Start: 2017-05-11 | End: 2017-05-13 | Stop reason: HOSPADM

## 2017-05-11 RX ORDER — HEPARIN SODIUM 5000 [USP'U]/ML
5000 INJECTION, SOLUTION INTRAVENOUS; SUBCUTANEOUS EVERY 8 HOURS SCHEDULED
Status: DISCONTINUED | OUTPATIENT
Start: 2017-05-11 | End: 2017-05-13 | Stop reason: HOSPADM

## 2017-05-11 RX ORDER — POTASSIUM CHLORIDE 20 MEQ/1
20 TABLET, EXTENDED RELEASE ORAL 3 TIMES DAILY
Status: DISCONTINUED | OUTPATIENT
Start: 2017-05-11 | End: 2017-05-12

## 2017-05-11 RX ADMIN — INSULIN GLARGINE 100 UNITS: 100 INJECTION, SOLUTION SUBCUTANEOUS at 21:32

## 2017-05-11 RX ADMIN — GABAPENTIN 800 MG: 400 CAPSULE ORAL at 17:10

## 2017-05-11 RX ADMIN — LEVALBUTEROL 0.63 MG: 1.25 SOLUTION, CONCENTRATE RESPIRATORY (INHALATION) at 21:32

## 2017-05-11 RX ADMIN — TRAZODONE HYDROCHLORIDE 100 MG: 100 TABLET ORAL at 21:33

## 2017-05-11 RX ADMIN — LEVALBUTEROL 1.25 MG: 1.25 SOLUTION, CONCENTRATE RESPIRATORY (INHALATION) at 07:42

## 2017-05-11 RX ADMIN — OXYCODONE HYDROCHLORIDE 30 MG: 5 TABLET ORAL at 10:22

## 2017-05-11 RX ADMIN — OXYCODONE HYDROCHLORIDE 30 MG: 5 TABLET ORAL at 19:00

## 2017-05-11 RX ADMIN — METHYLPREDNISOLONE SODIUM SUCCINATE 40 MG: 40 INJECTION, POWDER, FOR SOLUTION INTRAMUSCULAR; INTRAVENOUS at 14:40

## 2017-05-11 RX ADMIN — LUBIPROSTONE 24 MCG: 24 CAPSULE, GELATIN COATED ORAL at 09:00

## 2017-05-11 RX ADMIN — POTASSIUM CHLORIDE 10 MEQ: 750 TABLET, EXTENDED RELEASE ORAL at 09:01

## 2017-05-11 RX ADMIN — INSULIN LISPRO 2 UNITS: 100 INJECTION, SOLUTION INTRAVENOUS; SUBCUTANEOUS at 12:11

## 2017-05-11 RX ADMIN — ATORVASTATIN CALCIUM 40 MG: 40 TABLET, FILM COATED ORAL at 17:12

## 2017-05-11 RX ADMIN — HEPARIN SODIUM 5000 UNITS: 5000 INJECTION, SOLUTION INTRAVENOUS; SUBCUTANEOUS at 14:40

## 2017-05-11 RX ADMIN — TIZANIDINE 2 MG: 2 TABLET ORAL at 17:09

## 2017-05-11 RX ADMIN — MORPHINE SULFATE 105 MG: 30 TABLET, EXTENDED RELEASE ORAL at 20:07

## 2017-05-11 RX ADMIN — INSULIN LISPRO 4 UNITS: 100 INJECTION, SOLUTION INTRAVENOUS; SUBCUTANEOUS at 18:59

## 2017-05-11 RX ADMIN — LEVALBUTEROL 0.63 MG: 0.63 SOLUTION RESPIRATORY (INHALATION) at 16:06

## 2017-05-11 RX ADMIN — FLUTICASONE PROPIONATE AND SALMETEROL 1 PUFF: 50; 250 POWDER RESPIRATORY (INHALATION) at 17:09

## 2017-05-11 RX ADMIN — INSULIN LISPRO 20 UNITS: 100 INJECTION, SOLUTION INTRAVENOUS; SUBCUTANEOUS at 12:11

## 2017-05-11 RX ADMIN — IPRATROPIUM BROMIDE 0.5 MG: 0.5 SOLUTION RESPIRATORY (INHALATION) at 16:07

## 2017-05-11 RX ADMIN — TORSEMIDE 100 MG: 100 TABLET ORAL at 17:09

## 2017-05-11 RX ADMIN — PANTOPRAZOLE SODIUM 20 MG: 20 TABLET, DELAYED RELEASE ORAL at 05:58

## 2017-05-11 RX ADMIN — TAMSULOSIN HYDROCHLORIDE 0.4 MG: 0.4 CAPSULE ORAL at 17:09

## 2017-05-11 RX ADMIN — NITROGLYCERIN 0.4 MG: 0.4 TABLET SUBLINGUAL at 22:01

## 2017-05-11 RX ADMIN — INSULIN LISPRO 6 UNITS: 100 INJECTION, SOLUTION INTRAVENOUS; SUBCUTANEOUS at 21:29

## 2017-05-11 RX ADMIN — TOPIRAMATE 100 MG: 25 TABLET, FILM COATED ORAL at 17:09

## 2017-05-11 RX ADMIN — TORSEMIDE 100 MG: 100 TABLET ORAL at 09:00

## 2017-05-11 RX ADMIN — TIZANIDINE 2 MG: 2 TABLET ORAL at 20:06

## 2017-05-11 RX ADMIN — HEPARIN SODIUM 5000 UNITS: 5000 INJECTION, SOLUTION INTRAVENOUS; SUBCUTANEOUS at 21:30

## 2017-05-11 RX ADMIN — FLUTICASONE PROPIONATE AND SALMETEROL 1 PUFF: 50; 250 POWDER RESPIRATORY (INHALATION) at 09:00

## 2017-05-11 RX ADMIN — POTASSIUM CHLORIDE 20 MEQ: 1500 TABLET, EXTENDED RELEASE ORAL at 20:07

## 2017-05-11 RX ADMIN — OXYCODONE HYDROCHLORIDE 30 MG: 5 TABLET ORAL at 05:58

## 2017-05-11 RX ADMIN — METHYLPREDNISOLONE SODIUM SUCCINATE 40 MG: 40 INJECTION, POWDER, FOR SOLUTION INTRAMUSCULAR; INTRAVENOUS at 10:22

## 2017-05-11 RX ADMIN — FLUTICASONE PROPIONATE 2 PUFF: 110 AEROSOL, METERED RESPIRATORY (INHALATION) at 09:00

## 2017-05-11 RX ADMIN — METHYLPREDNISOLONE SODIUM SUCCINATE 40 MG: 40 INJECTION, POWDER, FOR SOLUTION INTRAMUSCULAR; INTRAVENOUS at 21:34

## 2017-05-11 RX ADMIN — GABAPENTIN 800 MG: 400 CAPSULE ORAL at 09:00

## 2017-05-11 RX ADMIN — ASPIRIN 81 MG: 81 TABLET, CHEWABLE ORAL at 09:03

## 2017-05-11 RX ADMIN — INSULIN LISPRO 20 UNITS: 100 INJECTION, SOLUTION INTRAVENOUS; SUBCUTANEOUS at 18:14

## 2017-05-11 RX ADMIN — DOXYCYCLINE 100 MG: 100 CAPSULE ORAL at 09:00

## 2017-05-11 RX ADMIN — TOPIRAMATE 100 MG: 25 TABLET, FILM COATED ORAL at 10:29

## 2017-05-11 RX ADMIN — HEPARIN SODIUM 5000 UNITS: 5000 INJECTION, SOLUTION INTRAVENOUS; SUBCUTANEOUS at 10:21

## 2017-05-11 RX ADMIN — DOXYCYCLINE 100 MG: 100 CAPSULE ORAL at 20:06

## 2017-05-11 RX ADMIN — INSULIN LISPRO 20 UNITS: 100 INJECTION, SOLUTION INTRAVENOUS; SUBCUTANEOUS at 09:04

## 2017-05-11 RX ADMIN — CITALOPRAM HYDROBROMIDE 40 MG: 20 TABLET ORAL at 09:00

## 2017-05-11 RX ADMIN — RANOLAZINE 1000 MG: 500 TABLET, FILM COATED, EXTENDED RELEASE ORAL at 17:09

## 2017-05-11 RX ADMIN — IPRATROPIUM BROMIDE 0.5 MG: 0.5 SOLUTION RESPIRATORY (INHALATION) at 21:25

## 2017-05-11 RX ADMIN — OXYCODONE HYDROCHLORIDE 30 MG: 5 TABLET ORAL at 14:41

## 2017-05-11 RX ADMIN — IPRATROPIUM BROMIDE 0.5 MG: 0.5 SOLUTION RESPIRATORY (INHALATION) at 07:42

## 2017-05-11 RX ADMIN — LEVALBUTEROL 0.63 MG: 1.25 SOLUTION, CONCENTRATE RESPIRATORY (INHALATION) at 16:07

## 2017-05-11 RX ADMIN — RANOLAZINE 1000 MG: 500 TABLET, FILM COATED, EXTENDED RELEASE ORAL at 09:00

## 2017-05-11 RX ADMIN — LEVALBUTEROL 0.63 MG: 0.63 SOLUTION RESPIRATORY (INHALATION) at 21:25

## 2017-05-11 RX ADMIN — CLOPIDOGREL BISULFATE 75 MG: 75 TABLET ORAL at 09:01

## 2017-05-11 RX ADMIN — POLYETHYLENE GLYCOL (3350) 17 G: 17 POWDER, FOR SOLUTION ORAL at 09:02

## 2017-05-11 RX ADMIN — NEBIVOLOL HYDROCHLORIDE 10 MG: 10 TABLET ORAL at 09:00

## 2017-05-11 RX ADMIN — LUBIPROSTONE 24 MCG: 24 CAPSULE, GELATIN COATED ORAL at 17:09

## 2017-05-11 RX ADMIN — FLUTICASONE PROPIONATE 2 PUFF: 110 AEROSOL, METERED RESPIRATORY (INHALATION) at 20:09

## 2017-05-11 RX ADMIN — MORPHINE SULFATE 105 MG: 30 TABLET, EXTENDED RELEASE ORAL at 17:10

## 2017-05-11 RX ADMIN — MORPHINE SULFATE 105 MG: 30 TABLET, EXTENDED RELEASE ORAL at 09:01

## 2017-05-11 RX ADMIN — TIZANIDINE 2 MG: 2 TABLET ORAL at 09:00

## 2017-05-11 RX ADMIN — POTASSIUM CHLORIDE 20 MEQ: 1500 TABLET, EXTENDED RELEASE ORAL at 17:10

## 2017-05-12 LAB
ANION GAP SERPL CALCULATED.3IONS-SCNC: 8 MMOL/L (ref 4–13)
ANISOCYTOSIS BLD QL SMEAR: PRESENT
ATRIAL RATE: 79 BPM
BASOPHILS # BLD MANUAL: 0 THOUSAND/UL (ref 0–0.1)
BASOPHILS NFR MAR MANUAL: 0 % (ref 0–1)
BUN SERPL-MCNC: 20 MG/DL (ref 5–25)
CALCIUM SERPL-MCNC: 9.1 MG/DL (ref 8.3–10.1)
CHLORIDE SERPL-SCNC: 102 MMOL/L (ref 100–108)
CO2 SERPL-SCNC: 30 MMOL/L (ref 21–32)
CREAT SERPL-MCNC: 1.59 MG/DL (ref 0.6–1.3)
EOSINOPHIL # BLD MANUAL: 0 THOUSAND/UL (ref 0–0.4)
EOSINOPHIL NFR BLD MANUAL: 0 % (ref 0–6)
ERYTHROCYTE [DISTWIDTH] IN BLOOD BY AUTOMATED COUNT: 17.4 % (ref 11.6–15.1)
GFR SERPL CREATININE-BSD FRML MDRD: 45.6 ML/MIN/1.73SQ M
GLUCOSE SERPL-MCNC: 219 MG/DL (ref 65–140)
GLUCOSE SERPL-MCNC: 229 MG/DL (ref 65–140)
GLUCOSE SERPL-MCNC: 246 MG/DL (ref 65–140)
GLUCOSE SERPL-MCNC: 250 MG/DL (ref 65–140)
GLUCOSE SERPL-MCNC: 394 MG/DL (ref 65–140)
HCT VFR BLD AUTO: 41.2 % (ref 36.5–49.3)
HGB BLD-MCNC: 12.6 G/DL (ref 12–17)
LG PLATELETS BLD QL SMEAR: PRESENT
LYMPHOCYTES # BLD AUTO: 1.2 THOUSAND/UL (ref 0.6–4.47)
LYMPHOCYTES # BLD AUTO: 7 % (ref 14–44)
MAGNESIUM SERPL-MCNC: 2.5 MG/DL (ref 1.6–2.6)
MCH RBC QN AUTO: 26.9 PG (ref 26.8–34.3)
MCHC RBC AUTO-ENTMCNC: 30.6 G/DL (ref 31.4–37.4)
MCV RBC AUTO: 88 FL (ref 82–98)
MONOCYTES # BLD AUTO: 0.86 THOUSAND/UL (ref 0–1.22)
MONOCYTES NFR BLD: 5 % (ref 4–12)
MYELOCYTES NFR BLD MANUAL: 1 % (ref 0–1)
NEUTROPHILS # BLD MANUAL: 14.96 THOUSAND/UL (ref 1.85–7.62)
NEUTS BAND NFR BLD MANUAL: 5 % (ref 0–8)
NEUTS SEG NFR BLD AUTO: 82 % (ref 43–75)
P AXIS: 54 DEGREES
PLATELET # BLD AUTO: 314 THOUSANDS/UL (ref 149–390)
PLATELET BLD QL SMEAR: ADEQUATE
PMV BLD AUTO: 10 FL (ref 8.9–12.7)
POTASSIUM SERPL-SCNC: 3.8 MMOL/L (ref 3.5–5.3)
PR INTERVAL: 190 MS
QRS AXIS: 0 DEGREES
QRSD INTERVAL: 88 MS
QT INTERVAL: 420 MS
QTC INTERVAL: 482 MS
RBC # BLD AUTO: 4.68 MILLION/UL (ref 3.88–5.62)
SODIUM SERPL-SCNC: 140 MMOL/L (ref 136–145)
T WAVE AXIS: 51 DEGREES
TOTAL CELLS COUNTED SPEC: 100
VENTRICULAR RATE: 79 BPM
WBC # BLD AUTO: 17.2 THOUSAND/UL (ref 4.31–10.16)

## 2017-05-12 PROCEDURE — 83735 ASSAY OF MAGNESIUM: CPT | Performed by: PHYSICIAN ASSISTANT

## 2017-05-12 PROCEDURE — 94660 CPAP INITIATION&MGMT: CPT

## 2017-05-12 PROCEDURE — 94762 N-INVAS EAR/PLS OXIMTRY CONT: CPT

## 2017-05-12 PROCEDURE — 82948 REAGENT STRIP/BLOOD GLUCOSE: CPT

## 2017-05-12 PROCEDURE — 94640 AIRWAY INHALATION TREATMENT: CPT

## 2017-05-12 PROCEDURE — 85027 COMPLETE CBC AUTOMATED: CPT | Performed by: INTERNAL MEDICINE

## 2017-05-12 PROCEDURE — 94760 N-INVAS EAR/PLS OXIMETRY 1: CPT

## 2017-05-12 PROCEDURE — 80048 BASIC METABOLIC PNL TOTAL CA: CPT | Performed by: INTERNAL MEDICINE

## 2017-05-12 PROCEDURE — 85007 BL SMEAR W/DIFF WBC COUNT: CPT | Performed by: INTERNAL MEDICINE

## 2017-05-12 RX ORDER — MORPHINE SULFATE 30 MG/1
60 TABLET, FILM COATED, EXTENDED RELEASE ORAL EVERY 12 HOURS SCHEDULED
Status: DISCONTINUED | OUTPATIENT
Start: 2017-05-12 | End: 2017-05-13 | Stop reason: HOSPADM

## 2017-05-12 RX ORDER — LEVALBUTEROL 1.25 MG/.5ML
1.25 SOLUTION, CONCENTRATE RESPIRATORY (INHALATION) EVERY 6 HOURS
Status: DISCONTINUED | OUTPATIENT
Start: 2017-05-13 | End: 2017-05-13 | Stop reason: HOSPADM

## 2017-05-12 RX ORDER — SODIUM CHLORIDE FOR INHALATION 0.9 %
3 VIAL, NEBULIZER (ML) INHALATION
Status: DISCONTINUED | OUTPATIENT
Start: 2017-05-13 | End: 2017-05-12

## 2017-05-12 RX ORDER — SPIRONOLACTONE 25 MG/1
25 TABLET ORAL DAILY
Status: DISCONTINUED | OUTPATIENT
Start: 2017-05-12 | End: 2017-05-13 | Stop reason: HOSPADM

## 2017-05-12 RX ORDER — AMLODIPINE BESYLATE 2.5 MG/1
2.5 TABLET ORAL DAILY
Status: DISCONTINUED | OUTPATIENT
Start: 2017-05-12 | End: 2017-05-13 | Stop reason: HOSPADM

## 2017-05-12 RX ORDER — GABAPENTIN 400 MG/1
800 CAPSULE ORAL 3 TIMES DAILY
Status: DISCONTINUED | OUTPATIENT
Start: 2017-05-12 | End: 2017-05-13 | Stop reason: HOSPADM

## 2017-05-12 RX ORDER — POTASSIUM CHLORIDE 750 MG/1
10 TABLET, EXTENDED RELEASE ORAL 3 TIMES DAILY
Status: DISCONTINUED | OUTPATIENT
Start: 2017-05-12 | End: 2017-05-13 | Stop reason: HOSPADM

## 2017-05-12 RX ORDER — MORPHINE SULFATE 30 MG/1
60 TABLET, FILM COATED, EXTENDED RELEASE ORAL 3 TIMES DAILY
Status: DISCONTINUED | OUTPATIENT
Start: 2017-05-12 | End: 2017-05-12

## 2017-05-12 RX ADMIN — INSULIN LISPRO 4 UNITS: 100 INJECTION, SOLUTION INTRAVENOUS; SUBCUTANEOUS at 17:06

## 2017-05-12 RX ADMIN — INSULIN LISPRO 4 UNITS: 100 INJECTION, SOLUTION INTRAVENOUS; SUBCUTANEOUS at 08:50

## 2017-05-12 RX ADMIN — AMLODIPINE BESYLATE 2.5 MG: 2.5 TABLET ORAL at 13:20

## 2017-05-12 RX ADMIN — FLUTICASONE PROPIONATE 2 PUFF: 110 AEROSOL, METERED RESPIRATORY (INHALATION) at 08:48

## 2017-05-12 RX ADMIN — TIZANIDINE 2 MG: 2 TABLET ORAL at 08:47

## 2017-05-12 RX ADMIN — INSULIN LISPRO 6 UNITS: 100 INJECTION, SOLUTION INTRAVENOUS; SUBCUTANEOUS at 11:50

## 2017-05-12 RX ADMIN — DOXYCYCLINE 100 MG: 100 CAPSULE ORAL at 20:34

## 2017-05-12 RX ADMIN — RANOLAZINE 1000 MG: 500 TABLET, FILM COATED, EXTENDED RELEASE ORAL at 08:47

## 2017-05-12 RX ADMIN — CLOPIDOGREL BISULFATE 75 MG: 75 TABLET ORAL at 08:48

## 2017-05-12 RX ADMIN — ZOLPIDEM TARTRATE 10 MG: 5 TABLET, FILM COATED ORAL at 23:24

## 2017-05-12 RX ADMIN — SPIRONOLACTONE 25 MG: 25 TABLET, FILM COATED ORAL at 13:21

## 2017-05-12 RX ADMIN — TIZANIDINE 2 MG: 2 TABLET ORAL at 17:08

## 2017-05-12 RX ADMIN — TORSEMIDE 100 MG: 100 TABLET ORAL at 08:48

## 2017-05-12 RX ADMIN — IPRATROPIUM BROMIDE 0.5 MG: 0.5 SOLUTION RESPIRATORY (INHALATION) at 13:40

## 2017-05-12 RX ADMIN — OXYCODONE HYDROCHLORIDE 30 MG: 5 TABLET ORAL at 08:10

## 2017-05-12 RX ADMIN — MORPHINE SULFATE 105 MG: 30 TABLET, EXTENDED RELEASE ORAL at 08:48

## 2017-05-12 RX ADMIN — INSULIN LISPRO 20 UNITS: 100 INJECTION, SOLUTION INTRAVENOUS; SUBCUTANEOUS at 08:49

## 2017-05-12 RX ADMIN — TOPIRAMATE 100 MG: 25 TABLET, FILM COATED ORAL at 17:08

## 2017-05-12 RX ADMIN — TORSEMIDE 100 MG: 100 TABLET ORAL at 17:08

## 2017-05-12 RX ADMIN — PANTOPRAZOLE SODIUM 20 MG: 20 TABLET, DELAYED RELEASE ORAL at 06:10

## 2017-05-12 RX ADMIN — DOXYCYCLINE 100 MG: 100 CAPSULE ORAL at 08:47

## 2017-05-12 RX ADMIN — LEVALBUTEROL 0.63 MG: 1.25 SOLUTION, CONCENTRATE RESPIRATORY (INHALATION) at 07:21

## 2017-05-12 RX ADMIN — POLYETHYLENE GLYCOL (3350) 17 G: 17 POWDER, FOR SOLUTION ORAL at 08:48

## 2017-05-12 RX ADMIN — POTASSIUM CHLORIDE 10 MEQ: 750 TABLET, EXTENDED RELEASE ORAL at 20:34

## 2017-05-12 RX ADMIN — INSULIN LISPRO 20 UNITS: 100 INJECTION, SOLUTION INTRAVENOUS; SUBCUTANEOUS at 11:50

## 2017-05-12 RX ADMIN — ATORVASTATIN CALCIUM 40 MG: 40 TABLET, FILM COATED ORAL at 17:09

## 2017-05-12 RX ADMIN — IPRATROPIUM BROMIDE 0.5 MG: 0.5 SOLUTION RESPIRATORY (INHALATION) at 07:21

## 2017-05-12 RX ADMIN — INSULIN LISPRO 20 UNITS: 100 INJECTION, SOLUTION INTRAVENOUS; SUBCUTANEOUS at 17:06

## 2017-05-12 RX ADMIN — FLUTICASONE PROPIONATE AND SALMETEROL 1 PUFF: 50; 250 POWDER RESPIRATORY (INHALATION) at 08:48

## 2017-05-12 RX ADMIN — GABAPENTIN 800 MG: 400 CAPSULE ORAL at 21:10

## 2017-05-12 RX ADMIN — RANOLAZINE 1000 MG: 500 TABLET, FILM COATED, EXTENDED RELEASE ORAL at 17:09

## 2017-05-12 RX ADMIN — ASPIRIN 81 MG: 81 TABLET, CHEWABLE ORAL at 08:47

## 2017-05-12 RX ADMIN — OXYCODONE HYDROCHLORIDE 30 MG: 5 TABLET ORAL at 18:20

## 2017-05-12 RX ADMIN — INSULIN LISPRO 10 UNITS: 100 INJECTION, SOLUTION INTRAVENOUS; SUBCUTANEOUS at 21:12

## 2017-05-12 RX ADMIN — GABAPENTIN 800 MG: 400 CAPSULE ORAL at 17:09

## 2017-05-12 RX ADMIN — LUBIPROSTONE 24 MCG: 24 CAPSULE, GELATIN COATED ORAL at 08:48

## 2017-05-12 RX ADMIN — TOPIRAMATE 100 MG: 25 TABLET, FILM COATED ORAL at 08:48

## 2017-05-12 RX ADMIN — TRAZODONE HYDROCHLORIDE 100 MG: 100 TABLET ORAL at 21:10

## 2017-05-12 RX ADMIN — METHYLPREDNISOLONE SODIUM SUCCINATE 40 MG: 40 INJECTION, POWDER, FOR SOLUTION INTRAMUSCULAR; INTRAVENOUS at 13:21

## 2017-05-12 RX ADMIN — HEPARIN SODIUM 5000 UNITS: 5000 INJECTION, SOLUTION INTRAVENOUS; SUBCUTANEOUS at 13:21

## 2017-05-12 RX ADMIN — METHYLPREDNISOLONE SODIUM SUCCINATE 40 MG: 40 INJECTION, POWDER, FOR SOLUTION INTRAMUSCULAR; INTRAVENOUS at 21:10

## 2017-05-12 RX ADMIN — LUBIPROSTONE 24 MCG: 24 CAPSULE, GELATIN COATED ORAL at 17:08

## 2017-05-12 RX ADMIN — OXYCODONE HYDROCHLORIDE 30 MG: 5 TABLET ORAL at 13:21

## 2017-05-12 RX ADMIN — GABAPENTIN 800 MG: 400 CAPSULE ORAL at 08:48

## 2017-05-12 RX ADMIN — HEPARIN SODIUM 5000 UNITS: 5000 INJECTION, SOLUTION INTRAVENOUS; SUBCUTANEOUS at 21:09

## 2017-05-12 RX ADMIN — ZOLPIDEM TARTRATE 10 MG: 5 TABLET, FILM COATED ORAL at 00:18

## 2017-05-12 RX ADMIN — POTASSIUM CHLORIDE 20 MEQ: 1500 TABLET, EXTENDED RELEASE ORAL at 08:48

## 2017-05-12 RX ADMIN — INSULIN GLARGINE 100 UNITS: 100 INJECTION, SOLUTION SUBCUTANEOUS at 21:14

## 2017-05-12 RX ADMIN — IPRATROPIUM BROMIDE 0.5 MG: 0.5 SOLUTION RESPIRATORY (INHALATION) at 19:00

## 2017-05-12 RX ADMIN — LEVALBUTEROL 1.25 MG: 1.25 SOLUTION, CONCENTRATE RESPIRATORY (INHALATION) at 19:00

## 2017-05-12 RX ADMIN — FLUTICASONE PROPIONATE 2 PUFF: 110 AEROSOL, METERED RESPIRATORY (INHALATION) at 20:34

## 2017-05-12 RX ADMIN — POTASSIUM CHLORIDE 10 MEQ: 750 TABLET, EXTENDED RELEASE ORAL at 17:09

## 2017-05-12 RX ADMIN — TAMSULOSIN HYDROCHLORIDE 0.4 MG: 0.4 CAPSULE ORAL at 17:10

## 2017-05-12 RX ADMIN — LEVALBUTEROL 0.63 MG: 1.25 SOLUTION, CONCENTRATE RESPIRATORY (INHALATION) at 13:40

## 2017-05-12 RX ADMIN — CITALOPRAM HYDROBROMIDE 40 MG: 20 TABLET ORAL at 08:48

## 2017-05-12 RX ADMIN — HEPARIN SODIUM 5000 UNITS: 5000 INJECTION, SOLUTION INTRAVENOUS; SUBCUTANEOUS at 06:10

## 2017-05-12 RX ADMIN — NEBIVOLOL HYDROCHLORIDE 10 MG: 10 TABLET ORAL at 08:47

## 2017-05-12 RX ADMIN — TIZANIDINE 2 MG: 2 TABLET ORAL at 20:35

## 2017-05-12 RX ADMIN — FLUTICASONE PROPIONATE AND SALMETEROL 1 PUFF: 50; 250 POWDER RESPIRATORY (INHALATION) at 17:10

## 2017-05-12 RX ADMIN — METHYLPREDNISOLONE SODIUM SUCCINATE 40 MG: 40 INJECTION, POWDER, FOR SOLUTION INTRAMUSCULAR; INTRAVENOUS at 06:09

## 2017-05-12 RX ADMIN — MORPHINE SULFATE 60 MG: 30 TABLET, EXTENDED RELEASE ORAL at 20:38

## 2017-05-13 VITALS
BODY MASS INDEX: 48.74 KG/M2 | TEMPERATURE: 96.7 F | RESPIRATION RATE: 20 BRPM | HEART RATE: 59 BPM | SYSTOLIC BLOOD PRESSURE: 133 MMHG | OXYGEN SATURATION: 92 % | DIASTOLIC BLOOD PRESSURE: 64 MMHG | WEIGHT: 315 LBS

## 2017-05-13 LAB
ANION GAP SERPL CALCULATED.3IONS-SCNC: 9 MMOL/L (ref 4–13)
ANISOCYTOSIS BLD QL SMEAR: PRESENT
BASOPHILS # BLD MANUAL: 0 THOUSAND/UL (ref 0–0.1)
BASOPHILS NFR MAR MANUAL: 0 % (ref 0–1)
BUN SERPL-MCNC: 26 MG/DL (ref 5–25)
CALCIUM SERPL-MCNC: 9.1 MG/DL (ref 8.3–10.1)
CHLORIDE SERPL-SCNC: 99 MMOL/L (ref 100–108)
CO2 SERPL-SCNC: 28 MMOL/L (ref 21–32)
CREAT SERPL-MCNC: 1.46 MG/DL (ref 0.6–1.3)
EOSINOPHIL # BLD MANUAL: 0 THOUSAND/UL (ref 0–0.4)
EOSINOPHIL NFR BLD MANUAL: 0 % (ref 0–6)
ERYTHROCYTE [DISTWIDTH] IN BLOOD BY AUTOMATED COUNT: 17.4 % (ref 11.6–15.1)
GFR SERPL CREATININE-BSD FRML MDRD: 50.3 ML/MIN/1.73SQ M
GLUCOSE SERPL-MCNC: 213 MG/DL (ref 65–140)
GLUCOSE SERPL-MCNC: 294 MG/DL (ref 65–140)
GLUCOSE SERPL-MCNC: 357 MG/DL (ref 65–140)
HCT VFR BLD AUTO: 41.8 % (ref 36.5–49.3)
HGB BLD-MCNC: 12.8 G/DL (ref 12–17)
LYMPHOCYTES # BLD AUTO: 1.14 THOUSAND/UL (ref 0.6–4.47)
LYMPHOCYTES # BLD AUTO: 6 % (ref 14–44)
MCH RBC QN AUTO: 26.9 PG (ref 26.8–34.3)
MCHC RBC AUTO-ENTMCNC: 30.6 G/DL (ref 31.4–37.4)
MCV RBC AUTO: 88 FL (ref 82–98)
MONOCYTES # BLD AUTO: 0.76 THOUSAND/UL (ref 0–1.22)
MONOCYTES NFR BLD: 4 % (ref 4–12)
NEUTROPHILS # BLD MANUAL: 16.86 THOUSAND/UL (ref 1.85–7.62)
NEUTS BAND NFR BLD MANUAL: 3 % (ref 0–8)
NEUTS SEG NFR BLD AUTO: 86 % (ref 43–75)
PLATELET # BLD AUTO: 314 THOUSANDS/UL (ref 149–390)
PLATELET BLD QL SMEAR: ADEQUATE
PMV BLD AUTO: 10 FL (ref 8.9–12.7)
POTASSIUM SERPL-SCNC: 4.5 MMOL/L (ref 3.5–5.3)
RBC # BLD AUTO: 4.76 MILLION/UL (ref 3.88–5.62)
SODIUM SERPL-SCNC: 136 MMOL/L (ref 136–145)
TOTAL CELLS COUNTED SPEC: 100
VARIANT LYMPHS # BLD AUTO: 1 %
WBC # BLD AUTO: 18.94 THOUSAND/UL (ref 4.31–10.16)

## 2017-05-13 PROCEDURE — 85027 COMPLETE CBC AUTOMATED: CPT | Performed by: INTERNAL MEDICINE

## 2017-05-13 PROCEDURE — 94760 N-INVAS EAR/PLS OXIMETRY 1: CPT

## 2017-05-13 PROCEDURE — 94660 CPAP INITIATION&MGMT: CPT

## 2017-05-13 PROCEDURE — 85007 BL SMEAR W/DIFF WBC COUNT: CPT | Performed by: INTERNAL MEDICINE

## 2017-05-13 PROCEDURE — 80048 BASIC METABOLIC PNL TOTAL CA: CPT | Performed by: INTERNAL MEDICINE

## 2017-05-13 PROCEDURE — 94620 HB PULMONARY STRESS TEST/SIMPLE: CPT

## 2017-05-13 PROCEDURE — 82948 REAGENT STRIP/BLOOD GLUCOSE: CPT

## 2017-05-13 PROCEDURE — 94640 AIRWAY INHALATION TREATMENT: CPT

## 2017-05-13 PROCEDURE — 94668 MNPJ CHEST WALL SBSQ: CPT

## 2017-05-13 RX ORDER — AMLODIPINE BESYLATE 2.5 MG/1
2.5 TABLET ORAL DAILY
Qty: 30 TABLET | Refills: 0 | Status: SHIPPED | OUTPATIENT
Start: 2017-05-13 | End: 2017-09-10 | Stop reason: HOSPADM

## 2017-05-13 RX ORDER — DOXYCYCLINE HYCLATE 100 MG/1
100 CAPSULE ORAL EVERY 12 HOURS SCHEDULED
Qty: 14 CAPSULE | Refills: 0 | Status: SHIPPED | OUTPATIENT
Start: 2017-05-13 | End: 2017-05-20

## 2017-05-13 RX ORDER — SPIRONOLACTONE 25 MG/1
25 TABLET ORAL DAILY
Qty: 30 TABLET | Refills: 0 | Status: SHIPPED | OUTPATIENT
Start: 2017-05-13 | End: 2017-05-13

## 2017-05-13 RX ORDER — SPIRONOLACTONE 25 MG/1
25 TABLET ORAL DAILY
Qty: 30 TABLET | Refills: 0 | Status: SHIPPED | OUTPATIENT
Start: 2017-05-13 | End: 2017-11-07 | Stop reason: HOSPADM

## 2017-05-13 RX ORDER — AMLODIPINE BESYLATE 2.5 MG/1
2.5 TABLET ORAL DAILY
Qty: 30 TABLET | Refills: 0 | Status: SHIPPED | OUTPATIENT
Start: 2017-05-13 | End: 2017-05-13

## 2017-05-13 RX ORDER — PREDNISONE 10 MG/1
TABLET ORAL
Qty: 18 TABLET | Refills: 0 | Status: SHIPPED | OUTPATIENT
Start: 2017-05-13 | End: 2017-09-10 | Stop reason: HOSPADM

## 2017-05-13 RX ADMIN — INSULIN LISPRO 20 UNITS: 100 INJECTION, SOLUTION INTRAVENOUS; SUBCUTANEOUS at 12:34

## 2017-05-13 RX ADMIN — INSULIN LISPRO 4 UNITS: 100 INJECTION, SOLUTION INTRAVENOUS; SUBCUTANEOUS at 10:06

## 2017-05-13 RX ADMIN — TOPIRAMATE 100 MG: 25 TABLET, FILM COATED ORAL at 10:03

## 2017-05-13 RX ADMIN — LEVALBUTEROL 1.25 MG: 1.25 SOLUTION, CONCENTRATE RESPIRATORY (INHALATION) at 07:39

## 2017-05-13 RX ADMIN — MORPHINE SULFATE 60 MG: 30 TABLET, EXTENDED RELEASE ORAL at 10:03

## 2017-05-13 RX ADMIN — POTASSIUM CHLORIDE 10 MEQ: 750 TABLET, EXTENDED RELEASE ORAL at 10:04

## 2017-05-13 RX ADMIN — POLYETHYLENE GLYCOL (3350) 17 G: 17 POWDER, FOR SOLUTION ORAL at 10:04

## 2017-05-13 RX ADMIN — IPRATROPIUM BROMIDE 0.5 MG: 0.5 SOLUTION RESPIRATORY (INHALATION) at 13:54

## 2017-05-13 RX ADMIN — FLUTICASONE PROPIONATE AND SALMETEROL 1 PUFF: 50; 250 POWDER RESPIRATORY (INHALATION) at 10:01

## 2017-05-13 RX ADMIN — INSULIN LISPRO 20 UNITS: 100 INJECTION, SOLUTION INTRAVENOUS; SUBCUTANEOUS at 10:05

## 2017-05-13 RX ADMIN — OXYCODONE HYDROCHLORIDE 30 MG: 5 TABLET ORAL at 08:22

## 2017-05-13 RX ADMIN — LEVALBUTEROL 1.25 MG: 1.25 SOLUTION, CONCENTRATE RESPIRATORY (INHALATION) at 13:54

## 2017-05-13 RX ADMIN — FLUTICASONE PROPIONATE 2 PUFF: 110 AEROSOL, METERED RESPIRATORY (INHALATION) at 10:01

## 2017-05-13 RX ADMIN — TIZANIDINE 2 MG: 2 TABLET ORAL at 10:05

## 2017-05-13 RX ADMIN — PANTOPRAZOLE SODIUM 20 MG: 20 TABLET, DELAYED RELEASE ORAL at 05:25

## 2017-05-13 RX ADMIN — LEVALBUTEROL 1.25 MG: 1.25 SOLUTION, CONCENTRATE RESPIRATORY (INHALATION) at 01:00

## 2017-05-13 RX ADMIN — METHYLPREDNISOLONE SODIUM SUCCINATE 40 MG: 40 INJECTION, POWDER, FOR SOLUTION INTRAMUSCULAR; INTRAVENOUS at 05:25

## 2017-05-13 RX ADMIN — TORSEMIDE 100 MG: 100 TABLET ORAL at 10:05

## 2017-05-13 RX ADMIN — AMLODIPINE BESYLATE 2.5 MG: 2.5 TABLET ORAL at 10:03

## 2017-05-13 RX ADMIN — CLOPIDOGREL BISULFATE 75 MG: 75 TABLET ORAL at 10:04

## 2017-05-13 RX ADMIN — INSULIN LISPRO 10 UNITS: 100 INJECTION, SOLUTION INTRAVENOUS; SUBCUTANEOUS at 12:34

## 2017-05-13 RX ADMIN — RANOLAZINE 1000 MG: 500 TABLET, FILM COATED, EXTENDED RELEASE ORAL at 10:02

## 2017-05-13 RX ADMIN — HEPARIN SODIUM 5000 UNITS: 5000 INJECTION, SOLUTION INTRAVENOUS; SUBCUTANEOUS at 05:25

## 2017-05-13 RX ADMIN — IPRATROPIUM BROMIDE 0.5 MG: 0.5 SOLUTION RESPIRATORY (INHALATION) at 07:39

## 2017-05-13 RX ADMIN — DOXYCYCLINE 100 MG: 100 CAPSULE ORAL at 10:04

## 2017-05-13 RX ADMIN — ASPIRIN 81 MG: 81 TABLET, CHEWABLE ORAL at 10:03

## 2017-05-13 RX ADMIN — OXYCODONE HYDROCHLORIDE 30 MG: 5 TABLET ORAL at 12:32

## 2017-05-13 RX ADMIN — LUBIPROSTONE 24 MCG: 24 CAPSULE, GELATIN COATED ORAL at 10:04

## 2017-05-13 RX ADMIN — SPIRONOLACTONE 25 MG: 25 TABLET, FILM COATED ORAL at 10:03

## 2017-05-13 RX ADMIN — GABAPENTIN 800 MG: 400 CAPSULE ORAL at 10:02

## 2017-05-13 RX ADMIN — CITALOPRAM HYDROBROMIDE 40 MG: 20 TABLET ORAL at 10:02

## 2017-05-13 RX ADMIN — IPRATROPIUM BROMIDE 0.5 MG: 0.5 SOLUTION RESPIRATORY (INHALATION) at 01:00

## 2017-05-13 RX ADMIN — NEBIVOLOL HYDROCHLORIDE 10 MG: 10 TABLET ORAL at 10:02

## 2017-05-17 ENCOUNTER — GENERIC CONVERSION - ENCOUNTER (OUTPATIENT)
Dept: OTHER | Facility: OTHER | Age: 57
End: 2017-05-17

## 2017-06-06 ENCOUNTER — TRANSCRIBE ORDERS (OUTPATIENT)
Dept: ADMINISTRATIVE | Facility: HOSPITAL | Age: 57
End: 2017-06-06

## 2017-06-06 ENCOUNTER — ALLSCRIPTS OFFICE VISIT (OUTPATIENT)
Dept: OTHER | Facility: OTHER | Age: 57
End: 2017-06-06

## 2017-06-06 DIAGNOSIS — Z86.73 PERSONAL HISTORY OF TRANSIENT ISCHEMIC ATTACK (TIA), AND CEREBRAL INFARCTION WITHOUT RESIDUAL DEFICITS: ICD-10-CM

## 2017-06-06 DIAGNOSIS — Z86.79 PERSONAL HISTORY OF OTHER DISEASES OF THE CIRCULATORY SYSTEM: ICD-10-CM

## 2017-06-06 DIAGNOSIS — E55.9 VITAMIN D DEFICIENCY: ICD-10-CM

## 2017-06-06 DIAGNOSIS — Z86.79 PERSONAL HISTORY OF CARDIOVASCULAR DISORDER: Primary | ICD-10-CM

## 2017-06-07 ENCOUNTER — HOSPITAL ENCOUNTER (INPATIENT)
Facility: HOSPITAL | Age: 57
LOS: 1 days | Discharge: HOME HEALTH | DRG: 291 | End: 2017-06-08
Attending: EMERGENCY MEDICINE | Admitting: INTERNAL MEDICINE
Payer: MEDICARE

## 2017-06-07 ENCOUNTER — APPOINTMENT (EMERGENCY)
Dept: RADIOLOGY | Facility: HOSPITAL | Age: 57
DRG: 291 | End: 2017-06-07
Payer: MEDICARE

## 2017-06-07 ENCOUNTER — GENERIC CONVERSION - ENCOUNTER (OUTPATIENT)
Dept: OTHER | Facility: OTHER | Age: 57
End: 2017-06-07

## 2017-06-07 DIAGNOSIS — R60.9 EDEMA: ICD-10-CM

## 2017-06-07 DIAGNOSIS — I50.32 CHRONIC DIASTOLIC CONGESTIVE HEART FAILURE (HCC): ICD-10-CM

## 2017-06-07 DIAGNOSIS — I50.9 CHF (CONGESTIVE HEART FAILURE) (HCC): Primary | ICD-10-CM

## 2017-06-07 DIAGNOSIS — I50.33 ACUTE ON CHRONIC DIASTOLIC CONGESTIVE HEART FAILURE (HCC): ICD-10-CM

## 2017-06-07 LAB
ALBUMIN SERPL BCP-MCNC: 2.7 G/DL (ref 3.5–5)
ALP SERPL-CCNC: 169 U/L (ref 46–116)
ALT SERPL W P-5'-P-CCNC: 24 U/L (ref 12–78)
ANION GAP SERPL CALCULATED.3IONS-SCNC: 8 MMOL/L (ref 4–13)
APTT PPP: 26 SECONDS (ref 23–35)
AST SERPL W P-5'-P-CCNC: 13 U/L (ref 5–45)
ATRIAL RATE: 57 BPM
BASOPHILS # BLD AUTO: 0.03 THOUSANDS/ΜL (ref 0–0.1)
BASOPHILS NFR BLD AUTO: 0 % (ref 0–1)
BILIRUB SERPL-MCNC: 0.4 MG/DL (ref 0.2–1)
BUN SERPL-MCNC: 12 MG/DL (ref 5–25)
CALCIUM SERPL-MCNC: 8.3 MG/DL (ref 8.3–10.1)
CHLORIDE SERPL-SCNC: 103 MMOL/L (ref 100–108)
CO2 SERPL-SCNC: 28 MMOL/L (ref 21–32)
CREAT SERPL-MCNC: 1.35 MG/DL (ref 0.6–1.3)
EOSINOPHIL # BLD AUTO: 0.28 THOUSAND/ΜL (ref 0–0.61)
EOSINOPHIL NFR BLD AUTO: 2 % (ref 0–6)
ERYTHROCYTE [DISTWIDTH] IN BLOOD BY AUTOMATED COUNT: 16.9 % (ref 11.6–15.1)
GFR SERPL CREATININE-BSD FRML MDRD: 55.1 ML/MIN/1.73SQ M
GLUCOSE SERPL-MCNC: 108 MG/DL (ref 65–140)
GLUCOSE SERPL-MCNC: 140 MG/DL (ref 65–140)
GLUCOSE SERPL-MCNC: 174 MG/DL (ref 65–140)
GLUCOSE SERPL-MCNC: 202 MG/DL (ref 65–140)
HCT VFR BLD AUTO: 41.8 % (ref 36.5–49.3)
HGB BLD-MCNC: 13.2 G/DL (ref 12–17)
INR PPP: 1.03 (ref 0.86–1.16)
LYMPHOCYTES # BLD AUTO: 1.6 THOUSANDS/ΜL (ref 0.6–4.47)
LYMPHOCYTES NFR BLD AUTO: 13 % (ref 14–44)
MCH RBC QN AUTO: 27.8 PG (ref 26.8–34.3)
MCHC RBC AUTO-ENTMCNC: 31.6 G/DL (ref 31.4–37.4)
MCV RBC AUTO: 88 FL (ref 82–98)
MONOCYTES # BLD AUTO: 0.76 THOUSAND/ΜL (ref 0.17–1.22)
MONOCYTES NFR BLD AUTO: 6 % (ref 4–12)
NEUTROPHILS # BLD AUTO: 9.3 THOUSANDS/ΜL (ref 1.85–7.62)
NEUTS SEG NFR BLD AUTO: 79 % (ref 43–75)
NRBC BLD AUTO-RTO: 0 /100 WBCS
NT-PROBNP SERPL-MCNC: 260 PG/ML
P AXIS: 48 DEGREES
PLATELET # BLD AUTO: 272 THOUSANDS/UL (ref 149–390)
PMV BLD AUTO: 10.2 FL (ref 8.9–12.7)
POTASSIUM SERPL-SCNC: 3.6 MMOL/L (ref 3.5–5.3)
PR INTERVAL: 196 MS
PROT SERPL-MCNC: 6.9 G/DL (ref 6.4–8.2)
PROTHROMBIN TIME: 13.5 SECONDS (ref 12.1–14.4)
QRS AXIS: -2 DEGREES
QRSD INTERVAL: 84 MS
QT INTERVAL: 456 MS
QTC INTERVAL: 443 MS
RBC # BLD AUTO: 4.75 MILLION/UL (ref 3.88–5.62)
SODIUM SERPL-SCNC: 139 MMOL/L (ref 136–145)
SPECIMEN SOURCE: NORMAL
T WAVE AXIS: 39 DEGREES
TROPONIN I BLD-MCNC: 0 NG/ML (ref 0–0.08)
VENTRICULAR RATE: 57 BPM
WBC # BLD AUTO: 11.97 THOUSAND/UL (ref 4.31–10.16)

## 2017-06-07 PROCEDURE — 82948 REAGENT STRIP/BLOOD GLUCOSE: CPT

## 2017-06-07 PROCEDURE — 85610 PROTHROMBIN TIME: CPT | Performed by: EMERGENCY MEDICINE

## 2017-06-07 PROCEDURE — 96374 THER/PROPH/DIAG INJ IV PUSH: CPT

## 2017-06-07 PROCEDURE — 85730 THROMBOPLASTIN TIME PARTIAL: CPT | Performed by: EMERGENCY MEDICINE

## 2017-06-07 PROCEDURE — 80053 COMPREHEN METABOLIC PANEL: CPT | Performed by: EMERGENCY MEDICINE

## 2017-06-07 PROCEDURE — 94660 CPAP INITIATION&MGMT: CPT

## 2017-06-07 PROCEDURE — 94760 N-INVAS EAR/PLS OXIMETRY 1: CPT

## 2017-06-07 PROCEDURE — 83880 ASSAY OF NATRIURETIC PEPTIDE: CPT | Performed by: EMERGENCY MEDICINE

## 2017-06-07 PROCEDURE — 93005 ELECTROCARDIOGRAM TRACING: CPT | Performed by: EMERGENCY MEDICINE

## 2017-06-07 PROCEDURE — 71020 HB CHEST X-RAY 2VW FRONTAL&LATL: CPT

## 2017-06-07 PROCEDURE — 99285 EMERGENCY DEPT VISIT HI MDM: CPT

## 2017-06-07 PROCEDURE — 84484 ASSAY OF TROPONIN QUANT: CPT

## 2017-06-07 PROCEDURE — 85025 COMPLETE CBC W/AUTO DIFF WBC: CPT | Performed by: EMERGENCY MEDICINE

## 2017-06-07 PROCEDURE — 36415 COLL VENOUS BLD VENIPUNCTURE: CPT | Performed by: EMERGENCY MEDICINE

## 2017-06-07 RX ORDER — CITALOPRAM 20 MG/1
40 TABLET ORAL DAILY
Status: DISCONTINUED | OUTPATIENT
Start: 2017-06-08 | End: 2017-06-08 | Stop reason: HOSPADM

## 2017-06-07 RX ORDER — AMLODIPINE BESYLATE 2.5 MG/1
2.5 TABLET ORAL DAILY
Status: DISCONTINUED | OUTPATIENT
Start: 2017-06-08 | End: 2017-06-08 | Stop reason: HOSPADM

## 2017-06-07 RX ORDER — RANOLAZINE 500 MG/1
1000 TABLET, EXTENDED RELEASE ORAL EVERY 12 HOURS
Status: DISCONTINUED | OUTPATIENT
Start: 2017-06-07 | End: 2017-06-08 | Stop reason: HOSPADM

## 2017-06-07 RX ORDER — TAMSULOSIN HYDROCHLORIDE 0.4 MG/1
0.4 CAPSULE ORAL
Status: DISCONTINUED | OUTPATIENT
Start: 2017-06-07 | End: 2017-06-08 | Stop reason: HOSPADM

## 2017-06-07 RX ORDER — HEPARIN SODIUM 5000 [USP'U]/ML
5000 INJECTION, SOLUTION INTRAVENOUS; SUBCUTANEOUS EVERY 8 HOURS SCHEDULED
Status: DISCONTINUED | OUTPATIENT
Start: 2017-06-07 | End: 2017-06-07

## 2017-06-07 RX ORDER — SPIRONOLACTONE 25 MG/1
25 TABLET ORAL DAILY
Status: DISCONTINUED | OUTPATIENT
Start: 2017-06-08 | End: 2017-06-08 | Stop reason: HOSPADM

## 2017-06-07 RX ORDER — FUROSEMIDE 10 MG/ML
40 INJECTION INTRAMUSCULAR; INTRAVENOUS ONCE
Status: COMPLETED | OUTPATIENT
Start: 2017-06-07 | End: 2017-06-07

## 2017-06-07 RX ORDER — CLOPIDOGREL BISULFATE 75 MG/1
75 TABLET ORAL DAILY
Status: DISCONTINUED | OUTPATIENT
Start: 2017-06-08 | End: 2017-06-08 | Stop reason: HOSPADM

## 2017-06-07 RX ORDER — ZOLPIDEM TARTRATE 5 MG/1
10 TABLET ORAL
Status: DISCONTINUED | OUTPATIENT
Start: 2017-06-07 | End: 2017-06-07

## 2017-06-07 RX ORDER — TIZANIDINE 2 MG/1
2 TABLET ORAL 3 TIMES DAILY
Status: DISCONTINUED | OUTPATIENT
Start: 2017-06-07 | End: 2017-06-08 | Stop reason: HOSPADM

## 2017-06-07 RX ORDER — BACLOFEN 10 MG/1
10 TABLET ORAL 3 TIMES DAILY PRN
Status: DISCONTINUED | OUTPATIENT
Start: 2017-06-07 | End: 2017-06-08 | Stop reason: HOSPADM

## 2017-06-07 RX ORDER — MORPHINE SULFATE 30 MG/1
60 TABLET, FILM COATED, EXTENDED RELEASE ORAL 2 TIMES DAILY
Status: DISCONTINUED | OUTPATIENT
Start: 2017-06-07 | End: 2017-06-08 | Stop reason: HOSPADM

## 2017-06-07 RX ORDER — ASPIRIN 81 MG/1
81 TABLET, CHEWABLE ORAL DAILY
Status: DISCONTINUED | OUTPATIENT
Start: 2017-06-08 | End: 2017-06-08 | Stop reason: HOSPADM

## 2017-06-07 RX ORDER — POLYETHYLENE GLYCOL 3350 17 G/17G
17 POWDER, FOR SOLUTION ORAL DAILY
Status: DISCONTINUED | OUTPATIENT
Start: 2017-06-08 | End: 2017-06-08 | Stop reason: HOSPADM

## 2017-06-07 RX ORDER — ACETAMINOPHEN 325 MG/1
650 TABLET ORAL EVERY 6 HOURS PRN
Status: DISCONTINUED | OUTPATIENT
Start: 2017-06-07 | End: 2017-06-08 | Stop reason: HOSPADM

## 2017-06-07 RX ORDER — GABAPENTIN 400 MG/1
800 CAPSULE ORAL 2 TIMES DAILY
Status: DISCONTINUED | OUTPATIENT
Start: 2017-06-07 | End: 2017-06-08 | Stop reason: HOSPADM

## 2017-06-07 RX ORDER — NEBIVOLOL 10 MG/1
10 TABLET ORAL DAILY
Status: DISCONTINUED | OUTPATIENT
Start: 2017-06-08 | End: 2017-06-08 | Stop reason: HOSPADM

## 2017-06-07 RX ORDER — PANTOPRAZOLE SODIUM 20 MG/1
20 TABLET, DELAYED RELEASE ORAL
Status: DISCONTINUED | OUTPATIENT
Start: 2017-06-08 | End: 2017-06-08 | Stop reason: HOSPADM

## 2017-06-07 RX ORDER — INSULIN GLARGINE 100 [IU]/ML
100 INJECTION, SOLUTION SUBCUTANEOUS
Status: DISCONTINUED | OUTPATIENT
Start: 2017-06-07 | End: 2017-06-08 | Stop reason: HOSPADM

## 2017-06-07 RX ORDER — ATORVASTATIN CALCIUM 40 MG/1
40 TABLET, FILM COATED ORAL
Status: DISCONTINUED | OUTPATIENT
Start: 2017-06-07 | End: 2017-06-08 | Stop reason: HOSPADM

## 2017-06-07 RX ORDER — OXYCODONE HYDROCHLORIDE 5 MG/1
15 TABLET ORAL EVERY 6 HOURS PRN
Status: DISCONTINUED | OUTPATIENT
Start: 2017-06-07 | End: 2017-06-08 | Stop reason: HOSPADM

## 2017-06-07 RX ORDER — NITROGLYCERIN 0.4 MG/1
0.4 TABLET SUBLINGUAL
Status: DISCONTINUED | OUTPATIENT
Start: 2017-06-07 | End: 2017-06-08 | Stop reason: HOSPADM

## 2017-06-07 RX ORDER — POTASSIUM CHLORIDE 750 MG/1
10 TABLET, EXTENDED RELEASE ORAL 3 TIMES DAILY
Status: DISCONTINUED | OUTPATIENT
Start: 2017-06-07 | End: 2017-06-08 | Stop reason: HOSPADM

## 2017-06-07 RX ORDER — TRAZODONE HYDROCHLORIDE 100 MG/1
100 TABLET ORAL
Status: DISCONTINUED | OUTPATIENT
Start: 2017-06-07 | End: 2017-06-08 | Stop reason: HOSPADM

## 2017-06-07 RX ORDER — ONDANSETRON 2 MG/ML
4 INJECTION INTRAMUSCULAR; INTRAVENOUS EVERY 6 HOURS PRN
Status: DISCONTINUED | OUTPATIENT
Start: 2017-06-07 | End: 2017-06-08 | Stop reason: HOSPADM

## 2017-06-07 RX ORDER — HEPARIN SODIUM 5000 [USP'U]/ML
5000 INJECTION, SOLUTION INTRAVENOUS; SUBCUTANEOUS EVERY 8 HOURS SCHEDULED
Status: DISCONTINUED | OUTPATIENT
Start: 2017-06-07 | End: 2017-06-08 | Stop reason: HOSPADM

## 2017-06-07 RX ORDER — FLUTICASONE PROPIONATE 44 UG/1
1 AEROSOL, METERED RESPIRATORY (INHALATION) EVERY 12 HOURS SCHEDULED
Status: DISCONTINUED | OUTPATIENT
Start: 2017-06-07 | End: 2017-06-07 | Stop reason: CLARIF

## 2017-06-07 RX ORDER — FUROSEMIDE 10 MG/ML
80 INJECTION INTRAMUSCULAR; INTRAVENOUS
Status: DISCONTINUED | OUTPATIENT
Start: 2017-06-07 | End: 2017-06-08 | Stop reason: HOSPADM

## 2017-06-07 RX ORDER — TOPIRAMATE 25 MG/1
100 TABLET ORAL 2 TIMES DAILY
Status: DISCONTINUED | OUTPATIENT
Start: 2017-06-07 | End: 2017-06-08 | Stop reason: HOSPADM

## 2017-06-07 RX ADMIN — FLUTICASONE PROPIONATE AND SALMETEROL 1 PUFF: 50; 250 POWDER RESPIRATORY (INHALATION) at 21:18

## 2017-06-07 RX ADMIN — POTASSIUM CHLORIDE 10 MEQ: 750 TABLET, EXTENDED RELEASE ORAL at 21:18

## 2017-06-07 RX ADMIN — OXYCODONE HYDROCHLORIDE 15 MG: 5 TABLET ORAL at 20:50

## 2017-06-07 RX ADMIN — HEPARIN SODIUM 5000 UNITS: 5000 INJECTION, SOLUTION INTRAVENOUS; SUBCUTANEOUS at 21:18

## 2017-06-07 RX ADMIN — INSULIN LISPRO 4 UNITS: 100 INJECTION, SOLUTION INTRAVENOUS; SUBCUTANEOUS at 21:18

## 2017-06-07 RX ADMIN — TIZANIDINE 2 MG: 2 TABLET ORAL at 21:18

## 2017-06-07 RX ADMIN — RANOLAZINE 1000 MG: 500 TABLET, FILM COATED, EXTENDED RELEASE ORAL at 21:18

## 2017-06-07 RX ADMIN — INSULIN LISPRO 20 UNITS: 100 INJECTION, SOLUTION INTRAVENOUS; SUBCUTANEOUS at 18:02

## 2017-06-07 RX ADMIN — TOPIRAMATE 100 MG: 25 TABLET, FILM COATED ORAL at 18:00

## 2017-06-07 RX ADMIN — GABAPENTIN 800 MG: 400 CAPSULE ORAL at 18:00

## 2017-06-07 RX ADMIN — ATORVASTATIN CALCIUM 40 MG: 40 TABLET, FILM COATED ORAL at 18:00

## 2017-06-07 RX ADMIN — MORPHINE SULFATE 60 MG: 30 TABLET, EXTENDED RELEASE ORAL at 17:59

## 2017-06-07 RX ADMIN — FUROSEMIDE 80 MG: 10 INJECTION, SOLUTION INTRAMUSCULAR; INTRAVENOUS at 17:59

## 2017-06-07 RX ADMIN — TAMSULOSIN HYDROCHLORIDE 0.4 MG: 0.4 CAPSULE ORAL at 18:00

## 2017-06-07 RX ADMIN — TRAZODONE HYDROCHLORIDE 100 MG: 100 TABLET ORAL at 21:18

## 2017-06-07 RX ADMIN — LUBIPROSTONE 24 MCG: 24 CAPSULE, GELATIN COATED ORAL at 18:24

## 2017-06-07 RX ADMIN — INSULIN GLARGINE 100 UNITS: 100 INJECTION, SOLUTION SUBCUTANEOUS at 21:19

## 2017-06-07 RX ADMIN — FUROSEMIDE 40 MG: 10 INJECTION, SOLUTION INTRAMUSCULAR; INTRAVENOUS at 12:00

## 2017-06-08 ENCOUNTER — APPOINTMENT (INPATIENT)
Dept: OCCUPATIONAL THERAPY | Facility: HOSPITAL | Age: 57
DRG: 291 | End: 2017-06-08
Payer: MEDICARE

## 2017-06-08 ENCOUNTER — APPOINTMENT (INPATIENT)
Dept: PHYSICAL THERAPY | Facility: HOSPITAL | Age: 57
DRG: 291 | End: 2017-06-08
Payer: MEDICARE

## 2017-06-08 VITALS
HEIGHT: 74 IN | HEART RATE: 59 BPM | TEMPERATURE: 97.6 F | SYSTOLIC BLOOD PRESSURE: 125 MMHG | BODY MASS INDEX: 40.43 KG/M2 | WEIGHT: 315 LBS | DIASTOLIC BLOOD PRESSURE: 72 MMHG | RESPIRATION RATE: 20 BRPM | OXYGEN SATURATION: 95 %

## 2017-06-08 LAB
ANION GAP SERPL CALCULATED.3IONS-SCNC: 10 MMOL/L (ref 4–13)
BUN SERPL-MCNC: 11 MG/DL (ref 5–25)
CALCIUM SERPL-MCNC: 8.4 MG/DL (ref 8.3–10.1)
CHLORIDE SERPL-SCNC: 104 MMOL/L (ref 100–108)
CO2 SERPL-SCNC: 28 MMOL/L (ref 21–32)
CREAT SERPL-MCNC: 1.34 MG/DL (ref 0.6–1.3)
ERYTHROCYTE [DISTWIDTH] IN BLOOD BY AUTOMATED COUNT: 17.5 % (ref 11.6–15.1)
GFR SERPL CREATININE-BSD FRML MDRD: 55.5 ML/MIN/1.73SQ M
GLUCOSE SERPL-MCNC: 130 MG/DL (ref 65–140)
GLUCOSE SERPL-MCNC: 137 MG/DL (ref 65–140)
GLUCOSE SERPL-MCNC: 141 MG/DL (ref 65–140)
GLUCOSE SERPL-MCNC: 188 MG/DL (ref 65–140)
HCT VFR BLD AUTO: 41 % (ref 36.5–49.3)
HGB BLD-MCNC: 12.4 G/DL (ref 12–17)
MCH RBC QN AUTO: 27.3 PG (ref 26.8–34.3)
MCHC RBC AUTO-ENTMCNC: 30.2 G/DL (ref 31.4–37.4)
MCV RBC AUTO: 90 FL (ref 82–98)
PLATELET # BLD AUTO: 254 THOUSANDS/UL (ref 149–390)
PMV BLD AUTO: 10 FL (ref 8.9–12.7)
POTASSIUM SERPL-SCNC: 3.4 MMOL/L (ref 3.5–5.3)
RBC # BLD AUTO: 4.55 MILLION/UL (ref 3.88–5.62)
SODIUM SERPL-SCNC: 142 MMOL/L (ref 136–145)
WBC # BLD AUTO: 8.76 THOUSAND/UL (ref 4.31–10.16)

## 2017-06-08 PROCEDURE — G8988 SELF CARE GOAL STATUS: HCPCS

## 2017-06-08 PROCEDURE — G8987 SELF CARE CURRENT STATUS: HCPCS

## 2017-06-08 PROCEDURE — 85027 COMPLETE CBC AUTOMATED: CPT | Performed by: PHYSICIAN ASSISTANT

## 2017-06-08 PROCEDURE — 97163 PT EVAL HIGH COMPLEX 45 MIN: CPT

## 2017-06-08 PROCEDURE — 82948 REAGENT STRIP/BLOOD GLUCOSE: CPT

## 2017-06-08 PROCEDURE — G8979 MOBILITY GOAL STATUS: HCPCS

## 2017-06-08 PROCEDURE — 94660 CPAP INITIATION&MGMT: CPT

## 2017-06-08 PROCEDURE — 97166 OT EVAL MOD COMPLEX 45 MIN: CPT

## 2017-06-08 PROCEDURE — G8978 MOBILITY CURRENT STATUS: HCPCS

## 2017-06-08 PROCEDURE — 80048 BASIC METABOLIC PNL TOTAL CA: CPT | Performed by: PHYSICIAN ASSISTANT

## 2017-06-08 RX ORDER — METOLAZONE 5 MG/1
5 TABLET ORAL ONCE
Status: COMPLETED | OUTPATIENT
Start: 2017-06-08 | End: 2017-06-08

## 2017-06-08 RX ORDER — POTASSIUM CHLORIDE 20 MEQ/1
20 TABLET, EXTENDED RELEASE ORAL ONCE
Status: COMPLETED | OUTPATIENT
Start: 2017-06-08 | End: 2017-06-08

## 2017-06-08 RX ADMIN — RANOLAZINE 1000 MG: 500 TABLET, FILM COATED, EXTENDED RELEASE ORAL at 08:30

## 2017-06-08 RX ADMIN — POTASSIUM CHLORIDE 10 MEQ: 750 TABLET, EXTENDED RELEASE ORAL at 08:30

## 2017-06-08 RX ADMIN — TOPIRAMATE 100 MG: 25 TABLET, FILM COATED ORAL at 08:30

## 2017-06-08 RX ADMIN — CITALOPRAM HYDROBROMIDE 40 MG: 20 TABLET ORAL at 08:30

## 2017-06-08 RX ADMIN — AMLODIPINE BESYLATE 2.5 MG: 2.5 TABLET ORAL at 08:29

## 2017-06-08 RX ADMIN — GABAPENTIN 800 MG: 400 CAPSULE ORAL at 08:30

## 2017-06-08 RX ADMIN — TAMSULOSIN HYDROCHLORIDE 0.4 MG: 0.4 CAPSULE ORAL at 17:11

## 2017-06-08 RX ADMIN — TIZANIDINE 2 MG: 2 TABLET ORAL at 17:11

## 2017-06-08 RX ADMIN — SPIRONOLACTONE 25 MG: 25 TABLET, FILM COATED ORAL at 08:30

## 2017-06-08 RX ADMIN — MORPHINE SULFATE 60 MG: 30 TABLET, EXTENDED RELEASE ORAL at 17:11

## 2017-06-08 RX ADMIN — HEPARIN SODIUM 5000 UNITS: 5000 INJECTION, SOLUTION INTRAVENOUS; SUBCUTANEOUS at 05:33

## 2017-06-08 RX ADMIN — FUROSEMIDE 80 MG: 10 INJECTION, SOLUTION INTRAMUSCULAR; INTRAVENOUS at 08:30

## 2017-06-08 RX ADMIN — INSULIN LISPRO 20 UNITS: 100 INJECTION, SOLUTION INTRAVENOUS; SUBCUTANEOUS at 08:33

## 2017-06-08 RX ADMIN — INSULIN LISPRO 20 UNITS: 100 INJECTION, SOLUTION INTRAVENOUS; SUBCUTANEOUS at 12:46

## 2017-06-08 RX ADMIN — ASPIRIN 81 MG 81 MG: 81 TABLET ORAL at 08:30

## 2017-06-08 RX ADMIN — PANTOPRAZOLE SODIUM 20 MG: 20 TABLET, DELAYED RELEASE ORAL at 05:33

## 2017-06-08 RX ADMIN — LUBIPROSTONE 24 MCG: 24 CAPSULE, GELATIN COATED ORAL at 17:11

## 2017-06-08 RX ADMIN — ATORVASTATIN CALCIUM 40 MG: 40 TABLET, FILM COATED ORAL at 17:10

## 2017-06-08 RX ADMIN — ACETAMINOPHEN 650 MG: 325 TABLET, FILM COATED ORAL at 08:38

## 2017-06-08 RX ADMIN — POTASSIUM CHLORIDE 20 MEQ: 1500 TABLET, EXTENDED RELEASE ORAL at 12:45

## 2017-06-08 RX ADMIN — TOPIRAMATE 100 MG: 25 TABLET, FILM COATED ORAL at 17:11

## 2017-06-08 RX ADMIN — HEPARIN SODIUM 5000 UNITS: 5000 INJECTION, SOLUTION INTRAVENOUS; SUBCUTANEOUS at 13:10

## 2017-06-08 RX ADMIN — CLOPIDOGREL BISULFATE 75 MG: 75 TABLET ORAL at 08:30

## 2017-06-08 RX ADMIN — OXYCODONE HYDROCHLORIDE 15 MG: 5 TABLET ORAL at 12:45

## 2017-06-08 RX ADMIN — METOLAZONE 5 MG: 5 TABLET ORAL at 14:36

## 2017-06-08 RX ADMIN — LUBIPROSTONE 24 MCG: 24 CAPSULE, GELATIN COATED ORAL at 08:32

## 2017-06-08 RX ADMIN — POTASSIUM CHLORIDE 10 MEQ: 750 TABLET, EXTENDED RELEASE ORAL at 17:10

## 2017-06-08 RX ADMIN — GABAPENTIN 800 MG: 400 CAPSULE ORAL at 17:11

## 2017-06-08 RX ADMIN — INSULIN LISPRO 20 UNITS: 100 INJECTION, SOLUTION INTRAVENOUS; SUBCUTANEOUS at 17:10

## 2017-06-08 RX ADMIN — FUROSEMIDE 80 MG: 10 INJECTION, SOLUTION INTRAMUSCULAR; INTRAVENOUS at 17:14

## 2017-06-08 RX ADMIN — POLYETHYLENE GLYCOL 3350 17 G: 17 POWDER, FOR SOLUTION ORAL at 08:33

## 2017-06-08 RX ADMIN — NEBIVOLOL HYDROCHLORIDE 10 MG: 10 TABLET ORAL at 08:30

## 2017-06-08 RX ADMIN — TIZANIDINE 2 MG: 2 TABLET ORAL at 10:04

## 2017-06-08 RX ADMIN — FLUTICASONE PROPIONATE AND SALMETEROL 1 PUFF: 50; 250 POWDER RESPIRATORY (INHALATION) at 08:31

## 2017-06-08 RX ADMIN — INSULIN LISPRO 2 UNITS: 100 INJECTION, SOLUTION INTRAVENOUS; SUBCUTANEOUS at 12:46

## 2017-06-08 RX ADMIN — MORPHINE SULFATE 60 MG: 30 TABLET, EXTENDED RELEASE ORAL at 08:28

## 2017-06-08 RX ADMIN — OXYCODONE HYDROCHLORIDE 15 MG: 5 TABLET ORAL at 05:31

## 2017-06-12 ENCOUNTER — GENERIC CONVERSION - ENCOUNTER (OUTPATIENT)
Dept: OTHER | Facility: OTHER | Age: 57
End: 2017-06-12

## 2017-06-15 ENCOUNTER — APPOINTMENT (OUTPATIENT)
Dept: LAB | Facility: HOSPITAL | Age: 57
End: 2017-06-15
Attending: PSYCHIATRY & NEUROLOGY
Payer: MEDICARE

## 2017-06-15 DIAGNOSIS — Z86.73 PERSONAL HISTORY OF TRANSIENT ISCHEMIC ATTACK (TIA), AND CEREBRAL INFARCTION WITHOUT RESIDUAL DEFICITS: ICD-10-CM

## 2017-06-15 LAB
BUN SERPL-MCNC: 17 MG/DL (ref 5–25)
CREAT SERPL-MCNC: 1.41 MG/DL (ref 0.6–1.3)
GFR SERPL CREATININE-BSD FRML MDRD: 52.4 ML/MIN/1.73SQ M

## 2017-06-15 PROCEDURE — 82565 ASSAY OF CREATININE: CPT

## 2017-06-15 PROCEDURE — 36415 COLL VENOUS BLD VENIPUNCTURE: CPT

## 2017-06-15 PROCEDURE — 84520 ASSAY OF UREA NITROGEN: CPT

## 2017-06-19 ENCOUNTER — HOSPITAL ENCOUNTER (OUTPATIENT)
Dept: RADIOLOGY | Facility: HOSPITAL | Age: 57
Discharge: HOME | End: 2017-06-19
Attending: PSYCHIATRY & NEUROLOGY
Payer: MEDICARE

## 2017-06-19 DIAGNOSIS — Z86.79 PERSONAL HISTORY OF OTHER DISEASES OF THE CIRCULATORY SYSTEM: ICD-10-CM

## 2017-06-19 PROCEDURE — 70544 MR ANGIOGRAPHY HEAD W/O DYE: CPT

## 2017-06-22 ENCOUNTER — GENERIC CONVERSION - ENCOUNTER (OUTPATIENT)
Dept: OTHER | Facility: OTHER | Age: 57
End: 2017-06-22

## 2017-06-22 ENCOUNTER — HOSPITAL ENCOUNTER (OUTPATIENT)
Dept: NON INVASIVE DIAGNOSTICS | Facility: CLINIC | Age: 57
Discharge: HOME | End: 2017-06-22
Payer: MEDICARE

## 2017-06-22 DIAGNOSIS — E55.9 VITAMIN D DEFICIENCY: ICD-10-CM

## 2017-06-22 PROCEDURE — 93306 TTE W/DOPPLER COMPLETE: CPT

## 2017-06-26 ENCOUNTER — LAB REQUISITION (OUTPATIENT)
Dept: LAB | Facility: HOSPITAL | Age: 57
End: 2017-06-26
Payer: MEDICARE

## 2017-06-26 DIAGNOSIS — I13.0 HYPERTENSIVE HEART AND CHRONIC KIDNEY DISEASE WITH HEART FAILURE AND STAGE 1 THROUGH STAGE 4 CHRONIC KIDNEY DISEASE, OR UNSPECIFIED CHRONIC KIDNEY DISEASE (CODE): ICD-10-CM

## 2017-06-26 DIAGNOSIS — E11.22 TYPE 2 DIABETES MELLITUS WITH DIABETIC CHRONIC KIDNEY DISEASE (HCC): ICD-10-CM

## 2017-06-26 DIAGNOSIS — I50.33 ACUTE ON CHRONIC DIASTOLIC CONGESTIVE HEART FAILURE (HCC): ICD-10-CM

## 2017-06-26 LAB
ANION GAP SERPL CALCULATED.3IONS-SCNC: 10 MMOL/L (ref 4–13)
BUN SERPL-MCNC: 18 MG/DL (ref 5–25)
CALCIUM SERPL-MCNC: 8.4 MG/DL (ref 8.3–10.1)
CHLORIDE SERPL-SCNC: 101 MMOL/L (ref 100–108)
CO2 SERPL-SCNC: 27 MMOL/L (ref 21–32)
CREAT SERPL-MCNC: 1.47 MG/DL (ref 0.6–1.3)
CREAT UR-MCNC: 36.2 MG/DL
ERYTHROCYTE [DISTWIDTH] IN BLOOD BY AUTOMATED COUNT: 17 % (ref 11.6–15.1)
GFR SERPL CREATININE-BSD FRML MDRD: 49.9 ML/MIN/1.73SQ M
GLUCOSE SERPL-MCNC: 131 MG/DL (ref 65–140)
HCT VFR BLD AUTO: 41.1 % (ref 36.5–49.3)
HGB BLD-MCNC: 13.1 G/DL (ref 12–17)
MCH RBC QN AUTO: 28.1 PG (ref 26.8–34.3)
MCHC RBC AUTO-ENTMCNC: 31.9 G/DL (ref 31.4–37.4)
MCV RBC AUTO: 88 FL (ref 82–98)
MICROALBUMIN UR-MCNC: <5 MG/L (ref 0–20)
MICROALBUMIN/CREAT 24H UR: <14 MG/G CREATININE (ref 0–30)
PLATELET # BLD AUTO: 280 THOUSANDS/UL (ref 149–390)
PMV BLD AUTO: 11 FL (ref 8.9–12.7)
POTASSIUM SERPL-SCNC: 3.4 MMOL/L (ref 3.5–5.3)
RBC # BLD AUTO: 4.66 MILLION/UL (ref 3.88–5.62)
SODIUM SERPL-SCNC: 138 MMOL/L (ref 136–145)
WBC # BLD AUTO: 14.47 THOUSAND/UL (ref 4.31–10.16)

## 2017-06-26 PROCEDURE — 80048 BASIC METABOLIC PNL TOTAL CA: CPT | Performed by: INTERNAL MEDICINE

## 2017-06-26 PROCEDURE — 82043 UR ALBUMIN QUANTITATIVE: CPT | Performed by: INTERNAL MEDICINE

## 2017-06-26 PROCEDURE — 85027 COMPLETE CBC AUTOMATED: CPT | Performed by: INTERNAL MEDICINE

## 2017-06-26 PROCEDURE — 82570 ASSAY OF URINE CREATININE: CPT | Performed by: INTERNAL MEDICINE

## 2017-06-27 ENCOUNTER — ALLSCRIPTS OFFICE VISIT (OUTPATIENT)
Dept: OTHER | Facility: OTHER | Age: 57
End: 2017-06-27

## 2017-07-11 ENCOUNTER — ALLSCRIPTS OFFICE VISIT (OUTPATIENT)
Dept: OTHER | Facility: OTHER | Age: 57
End: 2017-07-11

## 2017-07-19 ENCOUNTER — ALLSCRIPTS OFFICE VISIT (OUTPATIENT)
Dept: OTHER | Facility: OTHER | Age: 57
End: 2017-07-19

## 2017-08-01 ENCOUNTER — ALLSCRIPTS OFFICE VISIT (OUTPATIENT)
Dept: OTHER | Facility: OTHER | Age: 57
End: 2017-08-01

## 2017-08-11 DIAGNOSIS — E11.65 TYPE 2 DIABETES MELLITUS WITH HYPERGLYCEMIA (HCC): ICD-10-CM

## 2017-08-28 ENCOUNTER — GENERIC CONVERSION - ENCOUNTER (OUTPATIENT)
Dept: OTHER | Facility: OTHER | Age: 57
End: 2017-08-28

## 2017-09-05 ENCOUNTER — APPOINTMENT (EMERGENCY)
Dept: RADIOLOGY | Facility: HOSPITAL | Age: 57
DRG: 291 | End: 2017-09-05
Payer: MEDICARE

## 2017-09-05 ENCOUNTER — HOSPITAL ENCOUNTER (INPATIENT)
Facility: HOSPITAL | Age: 57
LOS: 5 days | Discharge: HOME | DRG: 291 | End: 2017-09-10
Attending: EMERGENCY MEDICINE | Admitting: FAMILY MEDICINE
Payer: MEDICARE

## 2017-09-05 ENCOUNTER — GENERIC CONVERSION - ENCOUNTER (OUTPATIENT)
Dept: OTHER | Facility: OTHER | Age: 57
End: 2017-09-05

## 2017-09-05 DIAGNOSIS — R06.02 SHORTNESS OF BREATH: ICD-10-CM

## 2017-09-05 DIAGNOSIS — R06.00 DYSPNEA ON EXERTION: ICD-10-CM

## 2017-09-05 DIAGNOSIS — I25.10 CORONARY ARTERY DISEASE INVOLVING NATIVE CORONARY ARTERY: ICD-10-CM

## 2017-09-05 DIAGNOSIS — R07.9 CHEST PAIN: Primary | ICD-10-CM

## 2017-09-05 PROBLEM — J96.10 CHRONIC RESPIRATORY FAILURE (HCC): Status: ACTIVE | Noted: 2017-09-05

## 2017-09-05 PROBLEM — R60.0 BILATERAL LEG EDEMA: Status: ACTIVE | Noted: 2017-09-05

## 2017-09-05 PROBLEM — R06.09 DYSPNEA ON EXERTION: Status: ACTIVE | Noted: 2017-09-05

## 2017-09-05 LAB
ALBUMIN SERPL BCP-MCNC: 2.8 G/DL (ref 3.5–5)
ALP SERPL-CCNC: 140 U/L (ref 46–116)
ALT SERPL W P-5'-P-CCNC: 40 U/L (ref 12–78)
ANION GAP SERPL CALCULATED.3IONS-SCNC: 5 MMOL/L (ref 4–13)
AST SERPL W P-5'-P-CCNC: 31 U/L (ref 5–45)
ATRIAL RATE: 63 BPM
BASOPHILS # BLD AUTO: 0.02 THOUSANDS/ΜL (ref 0–0.1)
BASOPHILS NFR BLD AUTO: 0 % (ref 0–1)
BILIRUB SERPL-MCNC: 0.32 MG/DL (ref 0.2–1)
BUN SERPL-MCNC: 18 MG/DL (ref 5–25)
CALCIUM SERPL-MCNC: 8.6 MG/DL (ref 8.3–10.1)
CHLORIDE SERPL-SCNC: 103 MMOL/L (ref 100–108)
CO2 SERPL-SCNC: 34 MMOL/L (ref 21–32)
CREAT SERPL-MCNC: 1.37 MG/DL (ref 0.6–1.3)
EOSINOPHIL # BLD AUTO: 0.29 THOUSAND/ΜL (ref 0–0.61)
EOSINOPHIL NFR BLD AUTO: 2 % (ref 0–6)
ERYTHROCYTE [DISTWIDTH] IN BLOOD BY AUTOMATED COUNT: 16.2 % (ref 11.6–15.1)
GFR SERPL CREATININE-BSD FRML MDRD: 58 ML/MIN/1.73SQ M
GLUCOSE SERPL-MCNC: 158 MG/DL (ref 65–140)
GLUCOSE SERPL-MCNC: 230 MG/DL (ref 65–140)
HCT VFR BLD AUTO: 41.7 % (ref 36.5–49.3)
HGB BLD-MCNC: 13 G/DL (ref 12–17)
LYMPHOCYTES # BLD AUTO: 1.6 THOUSANDS/ΜL (ref 0.6–4.47)
LYMPHOCYTES NFR BLD AUTO: 11 % (ref 14–44)
MCH RBC QN AUTO: 28.3 PG (ref 26.8–34.3)
MCHC RBC AUTO-ENTMCNC: 31.2 G/DL (ref 31.4–37.4)
MCV RBC AUTO: 91 FL (ref 82–98)
MONOCYTES # BLD AUTO: 0.84 THOUSAND/ΜL (ref 0.17–1.22)
MONOCYTES NFR BLD AUTO: 6 % (ref 4–12)
NEUTROPHILS # BLD AUTO: 11.86 THOUSANDS/ΜL (ref 1.85–7.62)
NEUTS SEG NFR BLD AUTO: 81 % (ref 43–75)
NRBC BLD AUTO-RTO: 0 /100 WBCS
NT-PROBNP SERPL-MCNC: 399 PG/ML
P AXIS: 55 DEGREES
PLATELET # BLD AUTO: 258 THOUSANDS/UL (ref 149–390)
PMV BLD AUTO: 10.8 FL (ref 8.9–12.7)
POTASSIUM SERPL-SCNC: 3.6 MMOL/L (ref 3.5–5.3)
PR INTERVAL: 214 MS
PROT SERPL-MCNC: 7 G/DL (ref 6.4–8.2)
QRS AXIS: 9 DEGREES
QRSD INTERVAL: 84 MS
QT INTERVAL: 472 MS
QTC INTERVAL: 483 MS
RBC # BLD AUTO: 4.6 MILLION/UL (ref 3.88–5.62)
SODIUM SERPL-SCNC: 142 MMOL/L (ref 136–145)
SPECIMEN SOURCE: NORMAL
T WAVE AXIS: 50 DEGREES
TROPONIN I BLD-MCNC: 0 NG/ML (ref 0–0.08)
TROPONIN I SERPL-MCNC: <0.02 NG/ML
VENTRICULAR RATE: 63 BPM
WBC # BLD AUTO: 14.61 THOUSAND/UL (ref 4.31–10.16)

## 2017-09-05 PROCEDURE — 96374 THER/PROPH/DIAG INJ IV PUSH: CPT

## 2017-09-05 PROCEDURE — 84484 ASSAY OF TROPONIN QUANT: CPT

## 2017-09-05 PROCEDURE — 85025 COMPLETE CBC W/AUTO DIFF WBC: CPT | Performed by: EMERGENCY MEDICINE

## 2017-09-05 PROCEDURE — 83880 ASSAY OF NATRIURETIC PEPTIDE: CPT | Performed by: EMERGENCY MEDICINE

## 2017-09-05 PROCEDURE — 93005 ELECTROCARDIOGRAM TRACING: CPT | Performed by: EMERGENCY MEDICINE

## 2017-09-05 PROCEDURE — 71020 HB CHEST X-RAY 2VW FRONTAL&LATL: CPT

## 2017-09-05 PROCEDURE — 82948 REAGENT STRIP/BLOOD GLUCOSE: CPT

## 2017-09-05 PROCEDURE — 36415 COLL VENOUS BLD VENIPUNCTURE: CPT | Performed by: EMERGENCY MEDICINE

## 2017-09-05 PROCEDURE — 80053 COMPREHEN METABOLIC PANEL: CPT | Performed by: EMERGENCY MEDICINE

## 2017-09-05 PROCEDURE — 99285 EMERGENCY DEPT VISIT HI MDM: CPT

## 2017-09-05 PROCEDURE — 84484 ASSAY OF TROPONIN QUANT: CPT | Performed by: PHYSICIAN ASSISTANT

## 2017-09-05 RX ORDER — TAMSULOSIN HYDROCHLORIDE 0.4 MG/1
0.4 CAPSULE ORAL
Status: DISCONTINUED | OUTPATIENT
Start: 2017-09-06 | End: 2017-09-10 | Stop reason: HOSPADM

## 2017-09-05 RX ORDER — AMLODIPINE BESYLATE 2.5 MG/1
2.5 TABLET ORAL DAILY
Status: DISCONTINUED | OUTPATIENT
Start: 2017-09-06 | End: 2017-09-10

## 2017-09-05 RX ORDER — ACETAMINOPHEN 325 MG/1
650 TABLET ORAL EVERY 4 HOURS PRN
Status: DISCONTINUED | OUTPATIENT
Start: 2017-09-05 | End: 2017-09-10 | Stop reason: HOSPADM

## 2017-09-05 RX ORDER — CLOPIDOGREL BISULFATE 75 MG/1
75 TABLET ORAL DAILY
Status: DISCONTINUED | OUTPATIENT
Start: 2017-09-06 | End: 2017-09-10 | Stop reason: HOSPADM

## 2017-09-05 RX ORDER — FLUTICASONE PROPIONATE 110 UG/1
1 AEROSOL, METERED RESPIRATORY (INHALATION)
Status: DISCONTINUED | OUTPATIENT
Start: 2017-09-05 | End: 2017-09-10 | Stop reason: HOSPADM

## 2017-09-05 RX ORDER — HEPARIN SODIUM 5000 [USP'U]/ML
7500 INJECTION, SOLUTION INTRAVENOUS; SUBCUTANEOUS EVERY 8 HOURS SCHEDULED
Status: DISCONTINUED | OUTPATIENT
Start: 2017-09-05 | End: 2017-09-10 | Stop reason: HOSPADM

## 2017-09-05 RX ORDER — CITALOPRAM 20 MG/1
40 TABLET ORAL DAILY
Status: DISCONTINUED | OUTPATIENT
Start: 2017-09-06 | End: 2017-09-10 | Stop reason: HOSPADM

## 2017-09-05 RX ORDER — ATORVASTATIN CALCIUM 40 MG/1
40 TABLET, FILM COATED ORAL
Status: DISCONTINUED | OUTPATIENT
Start: 2017-09-06 | End: 2017-09-10 | Stop reason: HOSPADM

## 2017-09-05 RX ORDER — ASPIRIN 81 MG/1
324 TABLET, CHEWABLE ORAL ONCE
Status: COMPLETED | OUTPATIENT
Start: 2017-09-05 | End: 2017-09-05

## 2017-09-05 RX ORDER — POTASSIUM CHLORIDE 750 MG/1
10 TABLET, EXTENDED RELEASE ORAL 3 TIMES DAILY
Status: DISCONTINUED | OUTPATIENT
Start: 2017-09-05 | End: 2017-09-06

## 2017-09-05 RX ORDER — BACLOFEN 10 MG/1
10 TABLET ORAL 3 TIMES DAILY PRN
Status: DISCONTINUED | OUTPATIENT
Start: 2017-09-05 | End: 2017-09-10 | Stop reason: HOSPADM

## 2017-09-05 RX ORDER — SPIRONOLACTONE 25 MG/1
25 TABLET ORAL DAILY
Status: DISCONTINUED | OUTPATIENT
Start: 2017-09-06 | End: 2017-09-06

## 2017-09-05 RX ORDER — TIZANIDINE 2 MG/1
2 TABLET ORAL 3 TIMES DAILY
Status: DISCONTINUED | OUTPATIENT
Start: 2017-09-05 | End: 2017-09-10 | Stop reason: HOSPADM

## 2017-09-05 RX ORDER — NITROGLYCERIN 0.4 MG/1
0.4 TABLET SUBLINGUAL
Status: DISCONTINUED | OUTPATIENT
Start: 2017-09-05 | End: 2017-09-10 | Stop reason: HOSPADM

## 2017-09-05 RX ORDER — ONDANSETRON 2 MG/ML
4 INJECTION INTRAMUSCULAR; INTRAVENOUS EVERY 6 HOURS PRN
Status: DISCONTINUED | OUTPATIENT
Start: 2017-09-05 | End: 2017-09-10 | Stop reason: HOSPADM

## 2017-09-05 RX ORDER — CALCIUM CARBONATE 200(500)MG
1000 TABLET,CHEWABLE ORAL DAILY PRN
Status: DISCONTINUED | OUTPATIENT
Start: 2017-09-05 | End: 2017-09-10 | Stop reason: HOSPADM

## 2017-09-05 RX ORDER — TORSEMIDE 100 MG/1
100 TABLET ORAL DAILY
Status: DISCONTINUED | OUTPATIENT
Start: 2017-09-06 | End: 2017-09-06

## 2017-09-05 RX ORDER — ZOLPIDEM TARTRATE 5 MG/1
10 TABLET ORAL
Status: DISCONTINUED | OUTPATIENT
Start: 2017-09-05 | End: 2017-09-10 | Stop reason: HOSPADM

## 2017-09-05 RX ORDER — NEBIVOLOL 10 MG/1
10 TABLET ORAL DAILY
Status: DISCONTINUED | OUTPATIENT
Start: 2017-09-06 | End: 2017-09-10 | Stop reason: HOSPADM

## 2017-09-05 RX ORDER — PANTOPRAZOLE SODIUM 20 MG/1
20 TABLET, DELAYED RELEASE ORAL
Status: DISCONTINUED | OUTPATIENT
Start: 2017-09-06 | End: 2017-09-10 | Stop reason: HOSPADM

## 2017-09-05 RX ORDER — RANOLAZINE 500 MG/1
1000 TABLET, EXTENDED RELEASE ORAL 2 TIMES DAILY
Status: DISCONTINUED | OUTPATIENT
Start: 2017-09-05 | End: 2017-09-10 | Stop reason: HOSPADM

## 2017-09-05 RX ORDER — OXYCODONE HYDROCHLORIDE 10 MG/1
30 TABLET ORAL EVERY 6 HOURS PRN
Status: DISCONTINUED | OUTPATIENT
Start: 2017-09-05 | End: 2017-09-10 | Stop reason: HOSPADM

## 2017-09-05 RX ORDER — INSULIN GLARGINE 100 [IU]/ML
100 INJECTION, SOLUTION SUBCUTANEOUS
Status: DISCONTINUED | OUTPATIENT
Start: 2017-09-05 | End: 2017-09-10 | Stop reason: HOSPADM

## 2017-09-05 RX ORDER — TRAZODONE HYDROCHLORIDE 100 MG/1
100 TABLET ORAL
Status: DISCONTINUED | OUTPATIENT
Start: 2017-09-05 | End: 2017-09-10 | Stop reason: HOSPADM

## 2017-09-05 RX ORDER — GABAPENTIN 400 MG/1
800 CAPSULE ORAL 2 TIMES DAILY
Status: DISCONTINUED | OUTPATIENT
Start: 2017-09-05 | End: 2017-09-10 | Stop reason: HOSPADM

## 2017-09-05 RX ORDER — TOPIRAMATE 100 MG/1
100 TABLET, FILM COATED ORAL 2 TIMES DAILY
Status: DISCONTINUED | OUTPATIENT
Start: 2017-09-05 | End: 2017-09-10 | Stop reason: HOSPADM

## 2017-09-05 RX ORDER — FUROSEMIDE 10 MG/ML
80 INJECTION INTRAMUSCULAR; INTRAVENOUS ONCE
Status: COMPLETED | OUTPATIENT
Start: 2017-09-05 | End: 2017-09-05

## 2017-09-05 RX ORDER — ASPIRIN 81 MG/1
81 TABLET, CHEWABLE ORAL DAILY
Status: DISCONTINUED | OUTPATIENT
Start: 2017-09-06 | End: 2017-09-10 | Stop reason: HOSPADM

## 2017-09-05 RX ADMIN — POTASSIUM CHLORIDE 10 MEQ: 750 TABLET, EXTENDED RELEASE ORAL at 23:09

## 2017-09-05 RX ADMIN — ASPIRIN 81 MG 324 MG: 81 TABLET ORAL at 18:28

## 2017-09-05 RX ADMIN — GABAPENTIN 800 MG: 400 CAPSULE ORAL at 23:07

## 2017-09-05 RX ADMIN — RANOLAZINE 1000 MG: 500 TABLET, FILM COATED, EXTENDED RELEASE ORAL at 23:07

## 2017-09-05 RX ADMIN — MORPHINE SULFATE 105 MG: 30 TABLET, EXTENDED RELEASE ORAL at 23:08

## 2017-09-05 RX ADMIN — HEPARIN SODIUM 7500 UNITS: 5000 INJECTION, SOLUTION INTRAVENOUS; SUBCUTANEOUS at 23:10

## 2017-09-05 RX ADMIN — FUROSEMIDE 80 MG: 10 INJECTION, SOLUTION INTRAMUSCULAR; INTRAVENOUS at 18:22

## 2017-09-05 RX ADMIN — INSULIN GLARGINE 100 UNITS: 100 INJECTION, SOLUTION SUBCUTANEOUS at 23:10

## 2017-09-06 ENCOUNTER — GENERIC CONVERSION - ENCOUNTER (OUTPATIENT)
Dept: OTHER | Facility: OTHER | Age: 57
End: 2017-09-06

## 2017-09-06 ENCOUNTER — APPOINTMENT (INPATIENT)
Dept: NON INVASIVE DIAGNOSTICS | Facility: HOSPITAL | Age: 57
DRG: 291 | End: 2017-09-06
Payer: MEDICARE

## 2017-09-06 LAB
ANION GAP SERPL CALCULATED.3IONS-SCNC: 5 MMOL/L (ref 4–13)
BUN SERPL-MCNC: 19 MG/DL (ref 5–25)
CALCIUM SERPL-MCNC: 8.2 MG/DL (ref 8.3–10.1)
CHLORIDE SERPL-SCNC: 105 MMOL/L (ref 100–108)
CHOLEST SERPL-MCNC: 104 MG/DL (ref 50–200)
CO2 SERPL-SCNC: 33 MMOL/L (ref 21–32)
CREAT SERPL-MCNC: 1.44 MG/DL (ref 0.6–1.3)
GFR SERPL CREATININE-BSD FRML MDRD: 55 ML/MIN/1.73SQ M
GLUCOSE SERPL-MCNC: 112 MG/DL (ref 65–140)
GLUCOSE SERPL-MCNC: 112 MG/DL (ref 65–140)
GLUCOSE SERPL-MCNC: 120 MG/DL (ref 65–140)
GLUCOSE SERPL-MCNC: 138 MG/DL (ref 65–140)
GLUCOSE SERPL-MCNC: 92 MG/DL (ref 65–140)
HDLC SERPL-MCNC: 25 MG/DL (ref 40–60)
LDLC SERPL CALC-MCNC: 44 MG/DL (ref 0–100)
POTASSIUM SERPL-SCNC: 3.2 MMOL/L (ref 3.5–5.3)
SODIUM SERPL-SCNC: 143 MMOL/L (ref 136–145)
TRIGL SERPL-MCNC: 175 MG/DL
TROPONIN I SERPL-MCNC: <0.02 NG/ML
TROPONIN I SERPL-MCNC: <0.02 NG/ML

## 2017-09-06 PROCEDURE — 93005 ELECTROCARDIOGRAM TRACING: CPT | Performed by: PHYSICIAN ASSISTANT

## 2017-09-06 PROCEDURE — 84484 ASSAY OF TROPONIN QUANT: CPT | Performed by: PHYSICIAN ASSISTANT

## 2017-09-06 PROCEDURE — 93970 EXTREMITY STUDY: CPT

## 2017-09-06 PROCEDURE — 82948 REAGENT STRIP/BLOOD GLUCOSE: CPT

## 2017-09-06 PROCEDURE — 80048 BASIC METABOLIC PNL TOTAL CA: CPT | Performed by: PHYSICIAN ASSISTANT

## 2017-09-06 PROCEDURE — 80061 LIPID PANEL: CPT | Performed by: PHYSICIAN ASSISTANT

## 2017-09-06 RX ORDER — POTASSIUM CHLORIDE 20 MEQ/1
40 TABLET, EXTENDED RELEASE ORAL 3 TIMES DAILY
Status: DISCONTINUED | OUTPATIENT
Start: 2017-09-06 | End: 2017-09-10 | Stop reason: HOSPADM

## 2017-09-06 RX ORDER — SPIRONOLACTONE 25 MG/1
25 TABLET ORAL 2 TIMES DAILY
Status: DISCONTINUED | OUTPATIENT
Start: 2017-09-06 | End: 2017-09-10 | Stop reason: HOSPADM

## 2017-09-06 RX ADMIN — TIZANIDINE 2 MG: 2 TABLET ORAL at 09:19

## 2017-09-06 RX ADMIN — TORSEMIDE 100 MG: 100 TABLET ORAL at 09:20

## 2017-09-06 RX ADMIN — AMLODIPINE BESYLATE 2.5 MG: 2.5 TABLET ORAL at 09:22

## 2017-09-06 RX ADMIN — RANOLAZINE 1000 MG: 500 TABLET, FILM COATED, EXTENDED RELEASE ORAL at 09:17

## 2017-09-06 RX ADMIN — HEPARIN SODIUM 7500 UNITS: 5000 INJECTION, SOLUTION INTRAVENOUS; SUBCUTANEOUS at 14:41

## 2017-09-06 RX ADMIN — TRAZODONE HYDROCHLORIDE 100 MG: 100 TABLET ORAL at 21:27

## 2017-09-06 RX ADMIN — HEPARIN SODIUM 7500 UNITS: 5000 INJECTION, SOLUTION INTRAVENOUS; SUBCUTANEOUS at 06:19

## 2017-09-06 RX ADMIN — FLUTICASONE PROPIONATE 1 PUFF: 110 AEROSOL, METERED RESPIRATORY (INHALATION) at 09:19

## 2017-09-06 RX ADMIN — NEBIVOLOL HYDROCHLORIDE 10 MG: 10 TABLET ORAL at 09:18

## 2017-09-06 RX ADMIN — SPIRONOLACTONE 25 MG: 25 TABLET, FILM COATED ORAL at 09:22

## 2017-09-06 RX ADMIN — TOPIRAMATE 100 MG: 100 TABLET, FILM COATED ORAL at 09:20

## 2017-09-06 RX ADMIN — LUBIPROSTONE 24 MCG: 24 CAPSULE, GELATIN COATED ORAL at 09:20

## 2017-09-06 RX ADMIN — INSULIN GLARGINE 100 UNITS: 100 INJECTION, SOLUTION SUBCUTANEOUS at 21:26

## 2017-09-06 RX ADMIN — TIZANIDINE 2 MG: 2 TABLET ORAL at 17:14

## 2017-09-06 RX ADMIN — RANOLAZINE 1000 MG: 500 TABLET, FILM COATED, EXTENDED RELEASE ORAL at 17:14

## 2017-09-06 RX ADMIN — CITALOPRAM HYDROBROMIDE 40 MG: 20 TABLET ORAL at 09:18

## 2017-09-06 RX ADMIN — ATORVASTATIN CALCIUM 40 MG: 40 TABLET, FILM COATED ORAL at 17:15

## 2017-09-06 RX ADMIN — TRAZODONE HYDROCHLORIDE 100 MG: 100 TABLET ORAL at 00:14

## 2017-09-06 RX ADMIN — OXYCODONE HYDROCHLORIDE 30 MG: 10 TABLET ORAL at 18:46

## 2017-09-06 RX ADMIN — FLUTICASONE PROPIONATE AND SALMETEROL 1 PUFF: 50; 250 POWDER RESPIRATORY (INHALATION) at 17:09

## 2017-09-06 RX ADMIN — ACETAMINOPHEN 650 MG: 325 TABLET, FILM COATED ORAL at 17:14

## 2017-09-06 RX ADMIN — TIZANIDINE 2 MG: 2 TABLET ORAL at 21:27

## 2017-09-06 RX ADMIN — LUBIPROSTONE 24 MCG: 24 CAPSULE, GELATIN COATED ORAL at 17:14

## 2017-09-06 RX ADMIN — PANTOPRAZOLE SODIUM 20 MG: 20 TABLET, DELAYED RELEASE ORAL at 17:16

## 2017-09-06 RX ADMIN — INSULIN LISPRO 20 UNITS: 100 INJECTION, SOLUTION INTRAVENOUS; SUBCUTANEOUS at 12:34

## 2017-09-06 RX ADMIN — TOPIRAMATE 100 MG: 100 TABLET, FILM COATED ORAL at 17:14

## 2017-09-06 RX ADMIN — POTASSIUM CHLORIDE 10 MEQ: 750 TABLET, EXTENDED RELEASE ORAL at 09:18

## 2017-09-06 RX ADMIN — FUROSEMIDE 100 MG: 10 INJECTION, SOLUTION INTRAMUSCULAR; INTRAVENOUS at 12:44

## 2017-09-06 RX ADMIN — POTASSIUM CHLORIDE 40 MEQ: 1500 TABLET, EXTENDED RELEASE ORAL at 21:27

## 2017-09-06 RX ADMIN — OXYCODONE HYDROCHLORIDE 30 MG: 10 TABLET ORAL at 00:14

## 2017-09-06 RX ADMIN — OXYCODONE HYDROCHLORIDE 30 MG: 10 TABLET ORAL at 12:30

## 2017-09-06 RX ADMIN — OXYCODONE HYDROCHLORIDE 30 MG: 10 TABLET ORAL at 06:17

## 2017-09-06 RX ADMIN — FUROSEMIDE 100 MG: 10 INJECTION, SOLUTION INTRAMUSCULAR; INTRAVENOUS at 17:16

## 2017-09-06 RX ADMIN — GABAPENTIN 800 MG: 400 CAPSULE ORAL at 17:14

## 2017-09-06 RX ADMIN — FLUTICASONE PROPIONATE AND SALMETEROL 1 PUFF: 50; 250 POWDER RESPIRATORY (INHALATION) at 09:19

## 2017-09-06 RX ADMIN — HEPARIN SODIUM 7500 UNITS: 5000 INJECTION, SOLUTION INTRAVENOUS; SUBCUTANEOUS at 21:27

## 2017-09-06 RX ADMIN — INSULIN LISPRO 20 UNITS: 100 INJECTION, SOLUTION INTRAVENOUS; SUBCUTANEOUS at 09:24

## 2017-09-06 RX ADMIN — CLOPIDOGREL BISULFATE 75 MG: 75 TABLET ORAL at 09:22

## 2017-09-06 RX ADMIN — MORPHINE SULFATE 105 MG: 30 TABLET, EXTENDED RELEASE ORAL at 21:26

## 2017-09-06 RX ADMIN — SPIRONOLACTONE 25 MG: 25 TABLET, FILM COATED ORAL at 17:15

## 2017-09-06 RX ADMIN — GABAPENTIN 800 MG: 400 CAPSULE ORAL at 09:18

## 2017-09-06 RX ADMIN — INSULIN LISPRO 20 UNITS: 100 INJECTION, SOLUTION INTRAVENOUS; SUBCUTANEOUS at 17:18

## 2017-09-06 RX ADMIN — MORPHINE SULFATE 105 MG: 30 TABLET, EXTENDED RELEASE ORAL at 09:21

## 2017-09-06 RX ADMIN — ASPIRIN 81 MG 81 MG: 81 TABLET ORAL at 09:18

## 2017-09-06 RX ADMIN — TAMSULOSIN HYDROCHLORIDE 0.4 MG: 0.4 CAPSULE ORAL at 17:15

## 2017-09-06 RX ADMIN — POTASSIUM CHLORIDE 40 MEQ: 1500 TABLET, EXTENDED RELEASE ORAL at 17:16

## 2017-09-06 RX ADMIN — FLUTICASONE PROPIONATE 1 PUFF: 110 AEROSOL, METERED RESPIRATORY (INHALATION) at 21:26

## 2017-09-06 RX ADMIN — PANTOPRAZOLE SODIUM 20 MG: 20 TABLET, DELAYED RELEASE ORAL at 06:17

## 2017-09-07 LAB
ANION GAP SERPL CALCULATED.3IONS-SCNC: 5 MMOL/L (ref 4–13)
BUN SERPL-MCNC: 18 MG/DL (ref 5–25)
CALCIUM SERPL-MCNC: 8.5 MG/DL (ref 8.3–10.1)
CHLORIDE SERPL-SCNC: 102 MMOL/L (ref 100–108)
CO2 SERPL-SCNC: 30 MMOL/L (ref 21–32)
CREAT SERPL-MCNC: 1.45 MG/DL (ref 0.6–1.3)
ERYTHROCYTE [DISTWIDTH] IN BLOOD BY AUTOMATED COUNT: 16.4 % (ref 11.6–15.1)
GFR SERPL CREATININE-BSD FRML MDRD: 54 ML/MIN/1.73SQ M
GLUCOSE SERPL-MCNC: 116 MG/DL (ref 65–140)
GLUCOSE SERPL-MCNC: 118 MG/DL (ref 65–140)
GLUCOSE SERPL-MCNC: 130 MG/DL (ref 65–140)
GLUCOSE SERPL-MCNC: 168 MG/DL (ref 65–140)
GLUCOSE SERPL-MCNC: 99 MG/DL (ref 65–140)
HCT VFR BLD AUTO: 38.9 % (ref 36.5–49.3)
HGB BLD-MCNC: 12.2 G/DL (ref 12–17)
MCH RBC QN AUTO: 28.5 PG (ref 26.8–34.3)
MCHC RBC AUTO-ENTMCNC: 31.4 G/DL (ref 31.4–37.4)
MCV RBC AUTO: 91 FL (ref 82–98)
PLATELET # BLD AUTO: 260 THOUSANDS/UL (ref 149–390)
PMV BLD AUTO: 10.6 FL (ref 8.9–12.7)
POTASSIUM SERPL-SCNC: 4.1 MMOL/L (ref 3.5–5.3)
RBC # BLD AUTO: 4.28 MILLION/UL (ref 3.88–5.62)
SODIUM SERPL-SCNC: 137 MMOL/L (ref 136–145)
WBC # BLD AUTO: 14.18 THOUSAND/UL (ref 4.31–10.16)

## 2017-09-07 PROCEDURE — 80048 BASIC METABOLIC PNL TOTAL CA: CPT | Performed by: INTERNAL MEDICINE

## 2017-09-07 PROCEDURE — 85027 COMPLETE CBC AUTOMATED: CPT | Performed by: INTERNAL MEDICINE

## 2017-09-07 PROCEDURE — 82948 REAGENT STRIP/BLOOD GLUCOSE: CPT

## 2017-09-07 RX ORDER — OXYCODONE HYDROCHLORIDE 10 MG/1
TABLET ORAL
Status: COMPLETED
Start: 2017-09-07 | End: 2017-09-07

## 2017-09-07 RX ADMIN — FUROSEMIDE 100 MG: 10 INJECTION, SOLUTION INTRAMUSCULAR; INTRAVENOUS at 10:13

## 2017-09-07 RX ADMIN — INSULIN LISPRO 2 UNITS: 100 INJECTION, SOLUTION INTRAVENOUS; SUBCUTANEOUS at 22:53

## 2017-09-07 RX ADMIN — MORPHINE SULFATE 105 MG: 30 TABLET, EXTENDED RELEASE ORAL at 09:00

## 2017-09-07 RX ADMIN — FLUTICASONE PROPIONATE 1 PUFF: 110 AEROSOL, METERED RESPIRATORY (INHALATION) at 20:02

## 2017-09-07 RX ADMIN — OXYCODONE HYDROCHLORIDE: 10 TABLET ORAL at 02:58

## 2017-09-07 RX ADMIN — LUBIPROSTONE 24 MCG: 24 CAPSULE, GELATIN COATED ORAL at 18:22

## 2017-09-07 RX ADMIN — OXYCODONE HYDROCHLORIDE 30 MG: 10 TABLET ORAL at 07:42

## 2017-09-07 RX ADMIN — GABAPENTIN 800 MG: 400 CAPSULE ORAL at 09:00

## 2017-09-07 RX ADMIN — INSULIN LISPRO 20 UNITS: 100 INJECTION, SOLUTION INTRAVENOUS; SUBCUTANEOUS at 18:23

## 2017-09-07 RX ADMIN — NEBIVOLOL HYDROCHLORIDE 10 MG: 10 TABLET ORAL at 09:00

## 2017-09-07 RX ADMIN — POTASSIUM CHLORIDE 40 MEQ: 1500 TABLET, EXTENDED RELEASE ORAL at 18:21

## 2017-09-07 RX ADMIN — FLUTICASONE PROPIONATE AND SALMETEROL 1 PUFF: 50; 250 POWDER RESPIRATORY (INHALATION) at 09:05

## 2017-09-07 RX ADMIN — INSULIN LISPRO 20 UNITS: 100 INJECTION, SOLUTION INTRAVENOUS; SUBCUTANEOUS at 09:06

## 2017-09-07 RX ADMIN — ATORVASTATIN CALCIUM 40 MG: 40 TABLET, FILM COATED ORAL at 18:21

## 2017-09-07 RX ADMIN — RANOLAZINE 1000 MG: 500 TABLET, FILM COATED, EXTENDED RELEASE ORAL at 09:01

## 2017-09-07 RX ADMIN — HEPARIN SODIUM 7500 UNITS: 5000 INJECTION, SOLUTION INTRAVENOUS; SUBCUTANEOUS at 13:32

## 2017-09-07 RX ADMIN — HEPARIN SODIUM 7500 UNITS: 5000 INJECTION, SOLUTION INTRAVENOUS; SUBCUTANEOUS at 22:51

## 2017-09-07 RX ADMIN — FLUTICASONE PROPIONATE 1 PUFF: 110 AEROSOL, METERED RESPIRATORY (INHALATION) at 09:05

## 2017-09-07 RX ADMIN — TOPIRAMATE 100 MG: 100 TABLET, FILM COATED ORAL at 18:21

## 2017-09-07 RX ADMIN — ASPIRIN 81 MG 81 MG: 81 TABLET ORAL at 09:11

## 2017-09-07 RX ADMIN — TRAZODONE HYDROCHLORIDE 100 MG: 100 TABLET ORAL at 22:53

## 2017-09-07 RX ADMIN — FUROSEMIDE 100 MG: 10 INJECTION, SOLUTION INTRAMUSCULAR; INTRAVENOUS at 18:21

## 2017-09-07 RX ADMIN — HEPARIN SODIUM 7500 UNITS: 5000 INJECTION, SOLUTION INTRAVENOUS; SUBCUTANEOUS at 06:32

## 2017-09-07 RX ADMIN — GABAPENTIN 800 MG: 400 CAPSULE ORAL at 18:22

## 2017-09-07 RX ADMIN — POTASSIUM CHLORIDE 40 MEQ: 1500 TABLET, EXTENDED RELEASE ORAL at 20:01

## 2017-09-07 RX ADMIN — RANOLAZINE 1000 MG: 500 TABLET, FILM COATED, EXTENDED RELEASE ORAL at 18:22

## 2017-09-07 RX ADMIN — SPIRONOLACTONE 25 MG: 25 TABLET, FILM COATED ORAL at 09:00

## 2017-09-07 RX ADMIN — TIZANIDINE 2 MG: 2 TABLET ORAL at 09:04

## 2017-09-07 RX ADMIN — INSULIN LISPRO 20 UNITS: 100 INJECTION, SOLUTION INTRAVENOUS; SUBCUTANEOUS at 13:34

## 2017-09-07 RX ADMIN — FLUTICASONE PROPIONATE AND SALMETEROL 1 PUFF: 50; 250 POWDER RESPIRATORY (INHALATION) at 18:23

## 2017-09-07 RX ADMIN — OXYCODONE HYDROCHLORIDE 30 MG: 10 TABLET ORAL at 22:52

## 2017-09-07 RX ADMIN — MORPHINE SULFATE 105 MG: 30 TABLET, EXTENDED RELEASE ORAL at 20:01

## 2017-09-07 RX ADMIN — POTASSIUM CHLORIDE 40 MEQ: 1500 TABLET, EXTENDED RELEASE ORAL at 09:01

## 2017-09-07 RX ADMIN — TOPIRAMATE 100 MG: 100 TABLET, FILM COATED ORAL at 09:04

## 2017-09-07 RX ADMIN — LUBIPROSTONE 24 MCG: 24 CAPSULE, GELATIN COATED ORAL at 09:05

## 2017-09-07 RX ADMIN — CITALOPRAM HYDROBROMIDE 40 MG: 20 TABLET ORAL at 09:01

## 2017-09-07 RX ADMIN — OXYCODONE HYDROCHLORIDE 30 MG: 10 TABLET ORAL at 15:44

## 2017-09-07 RX ADMIN — PANTOPRAZOLE SODIUM 20 MG: 20 TABLET, DELAYED RELEASE ORAL at 18:23

## 2017-09-07 RX ADMIN — SPIRONOLACTONE 25 MG: 25 TABLET, FILM COATED ORAL at 18:22

## 2017-09-07 RX ADMIN — TIZANIDINE 2 MG: 2 TABLET ORAL at 20:01

## 2017-09-07 RX ADMIN — INSULIN GLARGINE 100 UNITS: 100 INJECTION, SOLUTION SUBCUTANEOUS at 22:58

## 2017-09-07 RX ADMIN — AMLODIPINE BESYLATE 2.5 MG: 2.5 TABLET ORAL at 09:11

## 2017-09-07 RX ADMIN — TAMSULOSIN HYDROCHLORIDE 0.4 MG: 0.4 CAPSULE ORAL at 18:22

## 2017-09-07 RX ADMIN — TIZANIDINE 2 MG: 2 TABLET ORAL at 18:26

## 2017-09-07 RX ADMIN — OXYCODONE HYDROCHLORIDE 30 MG: 10 TABLET ORAL at 01:00

## 2017-09-07 RX ADMIN — CLOPIDOGREL BISULFATE 75 MG: 75 TABLET ORAL at 09:01

## 2017-09-08 LAB
ANION GAP SERPL CALCULATED.3IONS-SCNC: 4 MMOL/L (ref 4–13)
ATRIAL RATE: 56 BPM
ATRIAL RATE: 58 BPM
BUN SERPL-MCNC: 16 MG/DL (ref 5–25)
CALCIUM SERPL-MCNC: 8.7 MG/DL (ref 8.3–10.1)
CHLORIDE SERPL-SCNC: 104 MMOL/L (ref 100–108)
CO2 SERPL-SCNC: 28 MMOL/L (ref 21–32)
CREAT SERPL-MCNC: 1.21 MG/DL (ref 0.6–1.3)
ERYTHROCYTE [DISTWIDTH] IN BLOOD BY AUTOMATED COUNT: 16.3 % (ref 11.6–15.1)
GFR SERPL CREATININE-BSD FRML MDRD: 67 ML/MIN/1.73SQ M
GLUCOSE SERPL-MCNC: 114 MG/DL (ref 65–140)
GLUCOSE SERPL-MCNC: 114 MG/DL (ref 65–140)
GLUCOSE SERPL-MCNC: 159 MG/DL (ref 65–140)
GLUCOSE SERPL-MCNC: 94 MG/DL (ref 65–140)
GLUCOSE SERPL-MCNC: 96 MG/DL (ref 65–140)
HCT VFR BLD AUTO: 38.6 % (ref 36.5–49.3)
HGB BLD-MCNC: 12 G/DL (ref 12–17)
MAGNESIUM SERPL-MCNC: 2.5 MG/DL (ref 1.6–2.6)
MCH RBC QN AUTO: 28 PG (ref 26.8–34.3)
MCHC RBC AUTO-ENTMCNC: 31.1 G/DL (ref 31.4–37.4)
MCV RBC AUTO: 90 FL (ref 82–98)
P AXIS: 61 DEGREES
P AXIS: 63 DEGREES
PLATELET # BLD AUTO: 206 THOUSANDS/UL (ref 149–390)
PMV BLD AUTO: 10.3 FL (ref 8.9–12.7)
POTASSIUM SERPL-SCNC: 3.9 MMOL/L (ref 3.5–5.3)
PR INTERVAL: 210 MS
PR INTERVAL: 220 MS
QRS AXIS: 16 DEGREES
QRS AXIS: 4 DEGREES
QRSD INTERVAL: 88 MS
QRSD INTERVAL: 90 MS
QT INTERVAL: 472 MS
QT INTERVAL: 486 MS
QTC INTERVAL: 463 MS
QTC INTERVAL: 468 MS
RBC # BLD AUTO: 4.29 MILLION/UL (ref 3.88–5.62)
SODIUM SERPL-SCNC: 136 MMOL/L (ref 136–145)
T WAVE AXIS: 53 DEGREES
T WAVE AXIS: 63 DEGREES
VENTRICULAR RATE: 56 BPM
VENTRICULAR RATE: 58 BPM
WBC # BLD AUTO: 12.8 THOUSAND/UL (ref 4.31–10.16)

## 2017-09-08 PROCEDURE — 94660 CPAP INITIATION&MGMT: CPT

## 2017-09-08 PROCEDURE — 94760 N-INVAS EAR/PLS OXIMETRY 1: CPT

## 2017-09-08 PROCEDURE — 83735 ASSAY OF MAGNESIUM: CPT | Performed by: INTERNAL MEDICINE

## 2017-09-08 PROCEDURE — 82948 REAGENT STRIP/BLOOD GLUCOSE: CPT

## 2017-09-08 PROCEDURE — 85027 COMPLETE CBC AUTOMATED: CPT | Performed by: INTERNAL MEDICINE

## 2017-09-08 PROCEDURE — 80048 BASIC METABOLIC PNL TOTAL CA: CPT | Performed by: INTERNAL MEDICINE

## 2017-09-08 RX ORDER — METOLAZONE 5 MG/1
5 TABLET ORAL ONCE
Status: COMPLETED | OUTPATIENT
Start: 2017-09-08 | End: 2017-09-08

## 2017-09-08 RX ORDER — METOLAZONE 5 MG/1
5 TABLET ORAL ONCE
Status: DISCONTINUED | OUTPATIENT
Start: 2017-09-08 | End: 2017-09-08

## 2017-09-08 RX ADMIN — HEPARIN SODIUM 7500 UNITS: 5000 INJECTION, SOLUTION INTRAVENOUS; SUBCUTANEOUS at 21:49

## 2017-09-08 RX ADMIN — FUROSEMIDE 100 MG: 10 INJECTION, SOLUTION INTRAMUSCULAR; INTRAVENOUS at 12:00

## 2017-09-08 RX ADMIN — METOLAZONE 5 MG: 5 TABLET ORAL at 11:34

## 2017-09-08 RX ADMIN — FLUTICASONE PROPIONATE AND SALMETEROL 1 PUFF: 50; 250 POWDER RESPIRATORY (INHALATION) at 17:26

## 2017-09-08 RX ADMIN — FUROSEMIDE 100 MG: 10 INJECTION, SOLUTION INTRAMUSCULAR; INTRAVENOUS at 17:47

## 2017-09-08 RX ADMIN — NEBIVOLOL HYDROCHLORIDE 10 MG: 10 TABLET ORAL at 09:59

## 2017-09-08 RX ADMIN — PANTOPRAZOLE SODIUM 20 MG: 20 TABLET, DELAYED RELEASE ORAL at 07:21

## 2017-09-08 RX ADMIN — POTASSIUM CHLORIDE 40 MEQ: 1500 TABLET, EXTENDED RELEASE ORAL at 21:49

## 2017-09-08 RX ADMIN — TRAZODONE HYDROCHLORIDE 100 MG: 100 TABLET ORAL at 21:49

## 2017-09-08 RX ADMIN — PANTOPRAZOLE SODIUM 20 MG: 20 TABLET, DELAYED RELEASE ORAL at 17:28

## 2017-09-08 RX ADMIN — GABAPENTIN 800 MG: 400 CAPSULE ORAL at 17:27

## 2017-09-08 RX ADMIN — CLOPIDOGREL BISULFATE 75 MG: 75 TABLET ORAL at 09:59

## 2017-09-08 RX ADMIN — MORPHINE SULFATE 105 MG: 30 TABLET, EXTENDED RELEASE ORAL at 21:48

## 2017-09-08 RX ADMIN — FLUTICASONE PROPIONATE AND SALMETEROL 1 PUFF: 50; 250 POWDER RESPIRATORY (INHALATION) at 09:56

## 2017-09-08 RX ADMIN — ATORVASTATIN CALCIUM 40 MG: 40 TABLET, FILM COATED ORAL at 17:27

## 2017-09-08 RX ADMIN — OXYCODONE HYDROCHLORIDE 30 MG: 10 TABLET ORAL at 17:46

## 2017-09-08 RX ADMIN — OXYCODONE HYDROCHLORIDE 30 MG: 10 TABLET ORAL at 07:22

## 2017-09-08 RX ADMIN — TIZANIDINE 2 MG: 2 TABLET ORAL at 17:26

## 2017-09-08 RX ADMIN — TIZANIDINE 2 MG: 2 TABLET ORAL at 21:52

## 2017-09-08 RX ADMIN — AMLODIPINE BESYLATE 2.5 MG: 2.5 TABLET ORAL at 09:59

## 2017-09-08 RX ADMIN — SPIRONOLACTONE 25 MG: 25 TABLET, FILM COATED ORAL at 17:27

## 2017-09-08 RX ADMIN — RANOLAZINE 1000 MG: 500 TABLET, FILM COATED, EXTENDED RELEASE ORAL at 17:27

## 2017-09-08 RX ADMIN — INSULIN LISPRO 2 UNITS: 100 INJECTION, SOLUTION INTRAVENOUS; SUBCUTANEOUS at 12:49

## 2017-09-08 RX ADMIN — TOPIRAMATE 100 MG: 100 TABLET, FILM COATED ORAL at 17:26

## 2017-09-08 RX ADMIN — SPIRONOLACTONE 25 MG: 25 TABLET, FILM COATED ORAL at 09:58

## 2017-09-08 RX ADMIN — INSULIN LISPRO 20 UNITS: 100 INJECTION, SOLUTION INTRAVENOUS; SUBCUTANEOUS at 12:50

## 2017-09-08 RX ADMIN — POTASSIUM CHLORIDE 40 MEQ: 1500 TABLET, EXTENDED RELEASE ORAL at 09:58

## 2017-09-08 RX ADMIN — FLUTICASONE PROPIONATE 1 PUFF: 110 AEROSOL, METERED RESPIRATORY (INHALATION) at 10:12

## 2017-09-08 RX ADMIN — TAMSULOSIN HYDROCHLORIDE 0.4 MG: 0.4 CAPSULE ORAL at 17:27

## 2017-09-08 RX ADMIN — INSULIN LISPRO 20 UNITS: 100 INJECTION, SOLUTION INTRAVENOUS; SUBCUTANEOUS at 10:02

## 2017-09-08 RX ADMIN — HEPARIN SODIUM 7500 UNITS: 5000 INJECTION, SOLUTION INTRAVENOUS; SUBCUTANEOUS at 14:11

## 2017-09-08 RX ADMIN — INSULIN GLARGINE 100 UNITS: 100 INJECTION, SOLUTION SUBCUTANEOUS at 21:49

## 2017-09-08 RX ADMIN — POTASSIUM CHLORIDE 40 MEQ: 1500 TABLET, EXTENDED RELEASE ORAL at 17:27

## 2017-09-08 RX ADMIN — HEPARIN SODIUM 7500 UNITS: 5000 INJECTION, SOLUTION INTRAVENOUS; SUBCUTANEOUS at 05:56

## 2017-09-08 RX ADMIN — LUBIPROSTONE 24 MCG: 24 CAPSULE, GELATIN COATED ORAL at 09:57

## 2017-09-08 RX ADMIN — INSULIN LISPRO 20 UNITS: 100 INJECTION, SOLUTION INTRAVENOUS; SUBCUTANEOUS at 17:28

## 2017-09-08 RX ADMIN — GABAPENTIN 800 MG: 400 CAPSULE ORAL at 09:57

## 2017-09-08 RX ADMIN — RANOLAZINE 1000 MG: 500 TABLET, FILM COATED, EXTENDED RELEASE ORAL at 09:57

## 2017-09-08 RX ADMIN — TIZANIDINE 2 MG: 2 TABLET ORAL at 09:57

## 2017-09-08 RX ADMIN — LUBIPROSTONE 24 MCG: 24 CAPSULE, GELATIN COATED ORAL at 17:26

## 2017-09-08 RX ADMIN — ASPIRIN 81 MG 81 MG: 81 TABLET ORAL at 10:00

## 2017-09-08 RX ADMIN — TOPIRAMATE 100 MG: 100 TABLET, FILM COATED ORAL at 09:57

## 2017-09-08 RX ADMIN — CITALOPRAM HYDROBROMIDE 40 MG: 20 TABLET ORAL at 09:59

## 2017-09-08 RX ADMIN — MORPHINE SULFATE 105 MG: 30 TABLET, EXTENDED RELEASE ORAL at 09:59

## 2017-09-09 LAB
ANION GAP SERPL CALCULATED.3IONS-SCNC: 2 MMOL/L (ref 4–13)
BUN SERPL-MCNC: 18 MG/DL (ref 5–25)
CALCIUM SERPL-MCNC: 9 MG/DL (ref 8.3–10.1)
CHLORIDE SERPL-SCNC: 99 MMOL/L (ref 100–108)
CO2 SERPL-SCNC: 31 MMOL/L (ref 21–32)
CREAT SERPL-MCNC: 1.27 MG/DL (ref 0.6–1.3)
GFR SERPL CREATININE-BSD FRML MDRD: 64 ML/MIN/1.73SQ M
GLUCOSE SERPL-MCNC: 101 MG/DL (ref 65–140)
GLUCOSE SERPL-MCNC: 106 MG/DL (ref 65–140)
GLUCOSE SERPL-MCNC: 133 MG/DL (ref 65–140)
GLUCOSE SERPL-MCNC: 136 MG/DL (ref 65–140)
GLUCOSE SERPL-MCNC: 149 MG/DL (ref 65–140)
POTASSIUM SERPL-SCNC: 4.5 MMOL/L (ref 3.5–5.3)
SODIUM SERPL-SCNC: 132 MMOL/L (ref 136–145)

## 2017-09-09 PROCEDURE — 94760 N-INVAS EAR/PLS OXIMETRY 1: CPT

## 2017-09-09 PROCEDURE — 80048 BASIC METABOLIC PNL TOTAL CA: CPT | Performed by: INTERNAL MEDICINE

## 2017-09-09 PROCEDURE — 82948 REAGENT STRIP/BLOOD GLUCOSE: CPT

## 2017-09-09 RX ADMIN — GABAPENTIN 800 MG: 400 CAPSULE ORAL at 18:25

## 2017-09-09 RX ADMIN — FUROSEMIDE 100 MG: 10 INJECTION, SOLUTION INTRAMUSCULAR; INTRAVENOUS at 18:30

## 2017-09-09 RX ADMIN — LUBIPROSTONE 24 MCG: 24 CAPSULE, GELATIN COATED ORAL at 08:40

## 2017-09-09 RX ADMIN — HEPARIN SODIUM 7500 UNITS: 5000 INJECTION, SOLUTION INTRAVENOUS; SUBCUTANEOUS at 05:51

## 2017-09-09 RX ADMIN — TIZANIDINE 2 MG: 2 TABLET ORAL at 08:40

## 2017-09-09 RX ADMIN — INSULIN GLARGINE 100 UNITS: 100 INJECTION, SOLUTION SUBCUTANEOUS at 21:27

## 2017-09-09 RX ADMIN — HEPARIN SODIUM 7500 UNITS: 5000 INJECTION, SOLUTION INTRAVENOUS; SUBCUTANEOUS at 21:28

## 2017-09-09 RX ADMIN — FLUTICASONE PROPIONATE 1 PUFF: 110 AEROSOL, METERED RESPIRATORY (INHALATION) at 08:40

## 2017-09-09 RX ADMIN — NEBIVOLOL HYDROCHLORIDE 10 MG: 10 TABLET ORAL at 08:35

## 2017-09-09 RX ADMIN — TIZANIDINE 2 MG: 2 TABLET ORAL at 23:41

## 2017-09-09 RX ADMIN — PANTOPRAZOLE SODIUM 20 MG: 20 TABLET, DELAYED RELEASE ORAL at 06:55

## 2017-09-09 RX ADMIN — RANOLAZINE 1000 MG: 500 TABLET, FILM COATED, EXTENDED RELEASE ORAL at 18:24

## 2017-09-09 RX ADMIN — TRAZODONE HYDROCHLORIDE 100 MG: 100 TABLET ORAL at 21:27

## 2017-09-09 RX ADMIN — SPIRONOLACTONE 25 MG: 25 TABLET, FILM COATED ORAL at 08:36

## 2017-09-09 RX ADMIN — OXYCODONE HYDROCHLORIDE 30 MG: 10 TABLET ORAL at 19:48

## 2017-09-09 RX ADMIN — RANOLAZINE 1000 MG: 500 TABLET, FILM COATED, EXTENDED RELEASE ORAL at 08:35

## 2017-09-09 RX ADMIN — SPIRONOLACTONE 25 MG: 25 TABLET, FILM COATED ORAL at 18:25

## 2017-09-09 RX ADMIN — TOPIRAMATE 100 MG: 100 TABLET, FILM COATED ORAL at 08:39

## 2017-09-09 RX ADMIN — ASPIRIN 81 MG 81 MG: 81 TABLET ORAL at 08:36

## 2017-09-09 RX ADMIN — PANTOPRAZOLE SODIUM 20 MG: 20 TABLET, DELAYED RELEASE ORAL at 18:25

## 2017-09-09 RX ADMIN — POTASSIUM CHLORIDE 40 MEQ: 1500 TABLET, EXTENDED RELEASE ORAL at 21:27

## 2017-09-09 RX ADMIN — LUBIPROSTONE 24 MCG: 24 CAPSULE, GELATIN COATED ORAL at 18:29

## 2017-09-09 RX ADMIN — OXYCODONE HYDROCHLORIDE 30 MG: 10 TABLET ORAL at 13:00

## 2017-09-09 RX ADMIN — POTASSIUM CHLORIDE 40 MEQ: 1500 TABLET, EXTENDED RELEASE ORAL at 08:37

## 2017-09-09 RX ADMIN — GABAPENTIN 800 MG: 400 CAPSULE ORAL at 08:35

## 2017-09-09 RX ADMIN — INSULIN LISPRO 20 UNITS: 100 INJECTION, SOLUTION INTRAVENOUS; SUBCUTANEOUS at 08:38

## 2017-09-09 RX ADMIN — INSULIN LISPRO 20 UNITS: 100 INJECTION, SOLUTION INTRAVENOUS; SUBCUTANEOUS at 18:31

## 2017-09-09 RX ADMIN — TAMSULOSIN HYDROCHLORIDE 0.4 MG: 0.4 CAPSULE ORAL at 18:25

## 2017-09-09 RX ADMIN — TOPIRAMATE 100 MG: 100 TABLET, FILM COATED ORAL at 18:33

## 2017-09-09 RX ADMIN — POTASSIUM CHLORIDE 40 MEQ: 1500 TABLET, EXTENDED RELEASE ORAL at 18:24

## 2017-09-09 RX ADMIN — FLUTICASONE PROPIONATE AND SALMETEROL 1 PUFF: 50; 250 POWDER RESPIRATORY (INHALATION) at 08:39

## 2017-09-09 RX ADMIN — CITALOPRAM HYDROBROMIDE 40 MG: 20 TABLET ORAL at 08:36

## 2017-09-09 RX ADMIN — CLOPIDOGREL BISULFATE 75 MG: 75 TABLET ORAL at 08:36

## 2017-09-09 RX ADMIN — HEPARIN SODIUM 7500 UNITS: 5000 INJECTION, SOLUTION INTRAVENOUS; SUBCUTANEOUS at 13:00

## 2017-09-09 RX ADMIN — MORPHINE SULFATE 105 MG: 30 TABLET, EXTENDED RELEASE ORAL at 08:36

## 2017-09-09 RX ADMIN — AMLODIPINE BESYLATE 2.5 MG: 2.5 TABLET ORAL at 08:37

## 2017-09-09 RX ADMIN — INSULIN LISPRO 20 UNITS: 100 INJECTION, SOLUTION INTRAVENOUS; SUBCUTANEOUS at 13:04

## 2017-09-09 RX ADMIN — FLUTICASONE PROPIONATE AND SALMETEROL 1 PUFF: 50; 250 POWDER RESPIRATORY (INHALATION) at 18:28

## 2017-09-09 RX ADMIN — TIZANIDINE 2 MG: 2 TABLET ORAL at 18:33

## 2017-09-09 RX ADMIN — ATORVASTATIN CALCIUM 40 MG: 40 TABLET, FILM COATED ORAL at 18:25

## 2017-09-09 RX ADMIN — MORPHINE SULFATE 105 MG: 30 TABLET, EXTENDED RELEASE ORAL at 21:27

## 2017-09-09 RX ADMIN — FUROSEMIDE 100 MG: 10 INJECTION, SOLUTION INTRAMUSCULAR; INTRAVENOUS at 08:46

## 2017-09-10 VITALS
WEIGHT: 315 LBS | DIASTOLIC BLOOD PRESSURE: 52 MMHG | RESPIRATION RATE: 20 BRPM | HEIGHT: 74 IN | HEART RATE: 62 BPM | BODY MASS INDEX: 40.43 KG/M2 | OXYGEN SATURATION: 93 % | TEMPERATURE: 98.5 F | SYSTOLIC BLOOD PRESSURE: 98 MMHG

## 2017-09-10 LAB
ANION GAP SERPL CALCULATED.3IONS-SCNC: 5 MMOL/L (ref 4–13)
BUN SERPL-MCNC: 20 MG/DL (ref 5–25)
CALCIUM SERPL-MCNC: 8.8 MG/DL (ref 8.3–10.1)
CHLORIDE SERPL-SCNC: 100 MMOL/L (ref 100–108)
CO2 SERPL-SCNC: 32 MMOL/L (ref 21–32)
CREAT SERPL-MCNC: 1.44 MG/DL (ref 0.6–1.3)
GFR SERPL CREATININE-BSD FRML MDRD: 55 ML/MIN/1.73SQ M
GLUCOSE SERPL-MCNC: 107 MG/DL (ref 65–140)
GLUCOSE SERPL-MCNC: 120 MG/DL (ref 65–140)
GLUCOSE SERPL-MCNC: 128 MG/DL (ref 65–140)
POTASSIUM SERPL-SCNC: 3.3 MMOL/L (ref 3.5–5.3)
SODIUM SERPL-SCNC: 137 MMOL/L (ref 136–145)

## 2017-09-10 PROCEDURE — 82948 REAGENT STRIP/BLOOD GLUCOSE: CPT

## 2017-09-10 PROCEDURE — 80048 BASIC METABOLIC PNL TOTAL CA: CPT | Performed by: INTERNAL MEDICINE

## 2017-09-10 PROCEDURE — 94760 N-INVAS EAR/PLS OXIMETRY 1: CPT

## 2017-09-10 RX ORDER — TORSEMIDE 100 MG/1
100 TABLET ORAL 2 TIMES DAILY
Qty: 60 TABLET | Refills: 0 | Status: ON HOLD | OUTPATIENT
Start: 2017-09-10 | End: 2017-11-07

## 2017-09-10 RX ORDER — METOLAZONE 5 MG/1
5 TABLET ORAL WEEKLY
Qty: 5 TABLET | Refills: 0 | Status: ON HOLD | OUTPATIENT
Start: 2017-09-10 | End: 2017-10-13

## 2017-09-10 RX ORDER — POTASSIUM CHLORIDE 750 MG/1
20 TABLET, EXTENDED RELEASE ORAL 3 TIMES DAILY
Qty: 90 TABLET | Refills: 0 | Status: SHIPPED | OUTPATIENT
Start: 2017-09-10 | End: 2018-11-07 | Stop reason: SDUPTHER

## 2017-09-10 RX ORDER — TORSEMIDE 100 MG/1
100 TABLET ORAL
Status: DISCONTINUED | OUTPATIENT
Start: 2017-09-10 | End: 2017-09-10 | Stop reason: HOSPADM

## 2017-09-10 RX ORDER — POTASSIUM CHLORIDE 20 MEQ/1
40 TABLET, EXTENDED RELEASE ORAL ONCE
Status: COMPLETED | OUTPATIENT
Start: 2017-09-10 | End: 2017-09-10

## 2017-09-10 RX ADMIN — TORSEMIDE 100 MG: 100 TABLET ORAL at 12:49

## 2017-09-10 RX ADMIN — TOPIRAMATE 100 MG: 100 TABLET, FILM COATED ORAL at 08:11

## 2017-09-10 RX ADMIN — LUBIPROSTONE 24 MCG: 24 CAPSULE, GELATIN COATED ORAL at 08:11

## 2017-09-10 RX ADMIN — INSULIN LISPRO 20 UNITS: 100 INJECTION, SOLUTION INTRAVENOUS; SUBCUTANEOUS at 11:59

## 2017-09-10 RX ADMIN — GABAPENTIN 800 MG: 400 CAPSULE ORAL at 08:09

## 2017-09-10 RX ADMIN — POTASSIUM CHLORIDE 40 MEQ: 1500 TABLET, EXTENDED RELEASE ORAL at 08:09

## 2017-09-10 RX ADMIN — OXYCODONE HYDROCHLORIDE 30 MG: 10 TABLET ORAL at 09:10

## 2017-09-10 RX ADMIN — FUROSEMIDE 100 MG: 10 INJECTION, SOLUTION INTRAMUSCULAR; INTRAVENOUS at 08:15

## 2017-09-10 RX ADMIN — AMLODIPINE BESYLATE 2.5 MG: 2.5 TABLET ORAL at 08:09

## 2017-09-10 RX ADMIN — POTASSIUM CHLORIDE 40 MEQ: 1500 TABLET, EXTENDED RELEASE ORAL at 11:58

## 2017-09-10 RX ADMIN — NEBIVOLOL HYDROCHLORIDE 10 MG: 10 TABLET ORAL at 08:09

## 2017-09-10 RX ADMIN — FLUTICASONE PROPIONATE AND SALMETEROL 1 PUFF: 50; 250 POWDER RESPIRATORY (INHALATION) at 08:11

## 2017-09-10 RX ADMIN — HEPARIN SODIUM 7500 UNITS: 5000 INJECTION, SOLUTION INTRAVENOUS; SUBCUTANEOUS at 06:20

## 2017-09-10 RX ADMIN — TIZANIDINE 2 MG: 2 TABLET ORAL at 08:11

## 2017-09-10 RX ADMIN — INSULIN LISPRO 20 UNITS: 100 INJECTION, SOLUTION INTRAVENOUS; SUBCUTANEOUS at 08:11

## 2017-09-10 RX ADMIN — MORPHINE SULFATE 105 MG: 30 TABLET, EXTENDED RELEASE ORAL at 08:09

## 2017-09-10 RX ADMIN — FLUTICASONE PROPIONATE 1 PUFF: 110 AEROSOL, METERED RESPIRATORY (INHALATION) at 08:12

## 2017-09-10 RX ADMIN — CLOPIDOGREL BISULFATE 75 MG: 75 TABLET ORAL at 08:09

## 2017-09-10 RX ADMIN — ASPIRIN 81 MG 81 MG: 81 TABLET ORAL at 08:09

## 2017-09-10 RX ADMIN — PANTOPRAZOLE SODIUM 20 MG: 20 TABLET, DELAYED RELEASE ORAL at 06:21

## 2017-09-10 RX ADMIN — SPIRONOLACTONE 25 MG: 25 TABLET, FILM COATED ORAL at 08:09

## 2017-09-10 RX ADMIN — RANOLAZINE 1000 MG: 500 TABLET, FILM COATED, EXTENDED RELEASE ORAL at 08:08

## 2017-09-10 RX ADMIN — CITALOPRAM HYDROBROMIDE 40 MG: 20 TABLET ORAL at 08:09

## 2017-09-12 ENCOUNTER — ALLSCRIPTS OFFICE VISIT (OUTPATIENT)
Dept: OTHER | Facility: OTHER | Age: 57
End: 2017-09-12

## 2017-09-13 ENCOUNTER — GENERIC CONVERSION - ENCOUNTER (OUTPATIENT)
Dept: OTHER | Facility: OTHER | Age: 57
End: 2017-09-13

## 2017-10-03 ENCOUNTER — ALLSCRIPTS OFFICE VISIT (OUTPATIENT)
Dept: OTHER | Facility: OTHER | Age: 57
End: 2017-10-03

## 2017-10-04 ENCOUNTER — GENERIC CONVERSION - ENCOUNTER (OUTPATIENT)
Dept: OTHER | Facility: OTHER | Age: 57
End: 2017-10-04

## 2017-10-11 ENCOUNTER — APPOINTMENT (OUTPATIENT)
Dept: LAB | Facility: HOSPITAL | Age: 57
End: 2017-10-11
Attending: INTERNAL MEDICINE
Payer: MEDICARE

## 2017-10-11 ENCOUNTER — ALLSCRIPTS OFFICE VISIT (OUTPATIENT)
Dept: OTHER | Facility: OTHER | Age: 57
End: 2017-10-11

## 2017-10-11 DIAGNOSIS — E78.00 PURE HYPERCHOLESTEROLEMIA: ICD-10-CM

## 2017-10-11 DIAGNOSIS — E11.65 TYPE 2 DIABETES MELLITUS WITH HYPERGLYCEMIA (HCC): ICD-10-CM

## 2017-10-11 DIAGNOSIS — I10 ESSENTIAL (PRIMARY) HYPERTENSION: ICD-10-CM

## 2017-10-11 DIAGNOSIS — E55.9 VITAMIN D DEFICIENCY: ICD-10-CM

## 2017-10-11 DIAGNOSIS — I50.32 CHRONIC DIASTOLIC HEART FAILURE (HCC): ICD-10-CM

## 2017-10-11 DIAGNOSIS — R89.9 UNSPECIFIED ABNORMAL FINDING IN SPECIMENS FROM OTHER ORGANS, SYSTEMS AND TISSUES: ICD-10-CM

## 2017-10-11 DIAGNOSIS — I25.10 ATHEROSCLEROTIC HEART DISEASE OF NATIVE CORONARY ARTERY WITHOUT ANGINA PECTORIS: ICD-10-CM

## 2017-10-11 DIAGNOSIS — I20.9 ANGINA PECTORIS (HCC): ICD-10-CM

## 2017-10-11 DIAGNOSIS — I49.9 CARDIAC ARRHYTHMIA: ICD-10-CM

## 2017-10-11 LAB
ALBUMIN SERPL BCP-MCNC: 3 G/DL (ref 3.5–5)
ALP SERPL-CCNC: 126 U/L (ref 46–116)
ALT SERPL W P-5'-P-CCNC: 24 U/L (ref 12–78)
ANION GAP SERPL CALCULATED.3IONS-SCNC: 8 MMOL/L (ref 4–13)
AST SERPL W P-5'-P-CCNC: 13 U/L (ref 5–45)
BILIRUB SERPL-MCNC: 0.46 MG/DL (ref 0.2–1)
BUN SERPL-MCNC: 28 MG/DL (ref 5–25)
CALCIUM SERPL-MCNC: 8.8 MG/DL (ref 8.3–10.1)
CHLORIDE SERPL-SCNC: 99 MMOL/L (ref 100–108)
CO2 SERPL-SCNC: 32 MMOL/L (ref 21–32)
CREAT SERPL-MCNC: 1.62 MG/DL (ref 0.6–1.3)
CREAT UR-MCNC: 88 MG/DL
EST. AVERAGE GLUCOSE BLD GHB EST-MCNC: 151 MG/DL
GFR SERPL CREATININE-BSD FRML MDRD: 47 ML/MIN/1.73SQ M
GLUCOSE P FAST SERPL-MCNC: 92 MG/DL (ref 65–99)
HBA1C MFR BLD: 6.9 % (ref 4.2–6.3)
MICROALBUMIN UR-MCNC: <5 MG/L (ref 0–20)
MICROALBUMIN/CREAT 24H UR: <6 MG/G CREATININE (ref 0–30)
POTASSIUM SERPL-SCNC: 3.4 MMOL/L (ref 3.5–5.3)
PROT SERPL-MCNC: 6.7 G/DL (ref 6.4–8.2)
SODIUM SERPL-SCNC: 139 MMOL/L (ref 136–145)
T4 FREE SERPL-MCNC: 0.95 NG/DL (ref 0.76–1.46)
TSH SERPL DL<=0.05 MIU/L-ACNC: 2.34 UIU/ML (ref 0.36–3.74)

## 2017-10-11 PROCEDURE — 83036 HEMOGLOBIN GLYCOSYLATED A1C: CPT

## 2017-10-11 PROCEDURE — 82570 ASSAY OF URINE CREATININE: CPT

## 2017-10-11 PROCEDURE — 36415 COLL VENOUS BLD VENIPUNCTURE: CPT

## 2017-10-11 PROCEDURE — 80053 COMPREHEN METABOLIC PANEL: CPT

## 2017-10-11 PROCEDURE — 82043 UR ALBUMIN QUANTITATIVE: CPT

## 2017-10-11 PROCEDURE — 84439 ASSAY OF FREE THYROXINE: CPT

## 2017-10-11 PROCEDURE — 84443 ASSAY THYROID STIM HORMONE: CPT

## 2017-10-12 ENCOUNTER — APPOINTMENT (EMERGENCY)
Dept: RADIOLOGY | Facility: HOSPITAL | Age: 57
End: 2017-10-12
Payer: MEDICARE

## 2017-10-12 ENCOUNTER — HOSPITAL ENCOUNTER (OUTPATIENT)
Facility: HOSPITAL | Age: 57
Setting detail: OBSERVATION
Discharge: HOME | End: 2017-10-13
Attending: EMERGENCY MEDICINE | Admitting: INTERNAL MEDICINE
Payer: MEDICARE

## 2017-10-12 ENCOUNTER — GENERIC CONVERSION - ENCOUNTER (OUTPATIENT)
Dept: OTHER | Facility: OTHER | Age: 57
End: 2017-10-12

## 2017-10-12 ENCOUNTER — ALLSCRIPTS OFFICE VISIT (OUTPATIENT)
Dept: OTHER | Facility: OTHER | Age: 57
End: 2017-10-12

## 2017-10-12 DIAGNOSIS — I25.10 CORONARY ARTERY DISEASE INVOLVING NATIVE CORONARY ARTERY: ICD-10-CM

## 2017-10-12 DIAGNOSIS — R07.9 CHEST PAIN: Primary | ICD-10-CM

## 2017-10-12 PROBLEM — D72.829 LEUKOCYTOSIS: Status: ACTIVE | Noted: 2017-10-12

## 2017-10-12 PROBLEM — N18.30 STAGE 3 CHRONIC KIDNEY DISEASE (HCC): Status: ACTIVE | Noted: 2017-10-12

## 2017-10-12 LAB
ALBUMIN SERPL BCP-MCNC: 2.8 G/DL (ref 3.5–5)
ALP SERPL-CCNC: 117 U/L (ref 46–116)
ALT SERPL W P-5'-P-CCNC: 30 U/L (ref 12–78)
ANION GAP SERPL CALCULATED.3IONS-SCNC: 7 MMOL/L (ref 4–13)
APTT PPP: 22 SECONDS (ref 23–35)
AST SERPL W P-5'-P-CCNC: 16 U/L (ref 5–45)
ATRIAL RATE: 57 BPM
BASOPHILS # BLD AUTO: 0.03 THOUSANDS/ΜL (ref 0–0.1)
BASOPHILS NFR BLD AUTO: 0 % (ref 0–1)
BILIRUB SERPL-MCNC: 0.46 MG/DL (ref 0.2–1)
BUN SERPL-MCNC: 22 MG/DL (ref 5–25)
CALCIUM SERPL-MCNC: 8.8 MG/DL (ref 8.3–10.1)
CHLORIDE SERPL-SCNC: 102 MMOL/L (ref 100–108)
CO2 SERPL-SCNC: 30 MMOL/L (ref 21–32)
CREAT SERPL-MCNC: 1.5 MG/DL (ref 0.6–1.3)
EOSINOPHIL # BLD AUTO: 0.32 THOUSAND/ΜL (ref 0–0.61)
EOSINOPHIL NFR BLD AUTO: 2 % (ref 0–6)
ERYTHROCYTE [DISTWIDTH] IN BLOOD BY AUTOMATED COUNT: 16.1 % (ref 11.6–15.1)
GFR SERPL CREATININE-BSD FRML MDRD: 52 ML/MIN/1.73SQ M
GLUCOSE SERPL-MCNC: 132 MG/DL (ref 65–140)
GLUCOSE SERPL-MCNC: 149 MG/DL (ref 65–140)
HCT VFR BLD AUTO: 41.2 % (ref 36.5–49.3)
HGB BLD-MCNC: 12.9 G/DL (ref 12–17)
INR PPP: 1.04 (ref 0.86–1.16)
LYMPHOCYTES # BLD AUTO: 2.13 THOUSANDS/ΜL (ref 0.6–4.47)
LYMPHOCYTES NFR BLD AUTO: 15 % (ref 14–44)
MCH RBC QN AUTO: 28 PG (ref 26.8–34.3)
MCHC RBC AUTO-ENTMCNC: 31.3 G/DL (ref 31.4–37.4)
MCV RBC AUTO: 89 FL (ref 82–98)
MONOCYTES # BLD AUTO: 0.72 THOUSAND/ΜL (ref 0.17–1.22)
MONOCYTES NFR BLD AUTO: 5 % (ref 4–12)
NEUTROPHILS # BLD AUTO: 10.83 THOUSANDS/ΜL (ref 1.85–7.62)
NEUTS SEG NFR BLD AUTO: 78 % (ref 43–75)
NRBC BLD AUTO-RTO: 0 /100 WBCS
P AXIS: 27 DEGREES
PLATELET # BLD AUTO: 253 THOUSANDS/UL (ref 149–390)
PMV BLD AUTO: 10.3 FL (ref 8.9–12.7)
POTASSIUM SERPL-SCNC: 3.4 MMOL/L (ref 3.5–5.3)
PR INTERVAL: 204 MS
PROT SERPL-MCNC: 6.9 G/DL (ref 6.4–8.2)
PROTHROMBIN TIME: 13.6 SECONDS (ref 12.1–14.4)
QRS AXIS: 23 DEGREES
QRSD INTERVAL: 80 MS
QT INTERVAL: 458 MS
QTC INTERVAL: 445 MS
RBC # BLD AUTO: 4.61 MILLION/UL (ref 3.88–5.62)
SODIUM SERPL-SCNC: 139 MMOL/L (ref 136–145)
SPECIMEN SOURCE: NORMAL
T WAVE AXIS: 8 DEGREES
TROPONIN I BLD-MCNC: 0 NG/ML (ref 0–0.08)
TROPONIN I SERPL-MCNC: <0.02 NG/ML
VENTRICULAR RATE: 57 BPM
WBC # BLD AUTO: 14.03 THOUSAND/UL (ref 4.31–10.16)

## 2017-10-12 PROCEDURE — 36415 COLL VENOUS BLD VENIPUNCTURE: CPT

## 2017-10-12 PROCEDURE — 85730 THROMBOPLASTIN TIME PARTIAL: CPT

## 2017-10-12 PROCEDURE — 85025 COMPLETE CBC W/AUTO DIFF WBC: CPT

## 2017-10-12 PROCEDURE — 71010 HB CHEST X-RAY 1 VIEW FRONTAL (PORTABLE): CPT

## 2017-10-12 PROCEDURE — 93005 ELECTROCARDIOGRAM TRACING: CPT

## 2017-10-12 PROCEDURE — 80053 COMPREHEN METABOLIC PANEL: CPT

## 2017-10-12 PROCEDURE — 84484 ASSAY OF TROPONIN QUANT: CPT | Performed by: PHYSICIAN ASSISTANT

## 2017-10-12 PROCEDURE — 82948 REAGENT STRIP/BLOOD GLUCOSE: CPT

## 2017-10-12 PROCEDURE — 99285 EMERGENCY DEPT VISIT HI MDM: CPT

## 2017-10-12 PROCEDURE — 84484 ASSAY OF TROPONIN QUANT: CPT

## 2017-10-12 PROCEDURE — 85610 PROTHROMBIN TIME: CPT

## 2017-10-12 RX ORDER — HEPARIN SODIUM 5000 [USP'U]/ML
5000 INJECTION, SOLUTION INTRAVENOUS; SUBCUTANEOUS EVERY 8 HOURS SCHEDULED
Status: DISCONTINUED | OUTPATIENT
Start: 2017-10-12 | End: 2017-10-13 | Stop reason: HOSPADM

## 2017-10-12 RX ORDER — POTASSIUM CHLORIDE 20 MEQ/1
20 TABLET, EXTENDED RELEASE ORAL 3 TIMES DAILY
Status: DISCONTINUED | OUTPATIENT
Start: 2017-10-12 | End: 2017-10-13 | Stop reason: HOSPADM

## 2017-10-12 RX ORDER — MELATONIN
2000 DAILY
COMMUNITY
End: 2018-03-21 | Stop reason: DRUGHIGH

## 2017-10-12 RX ORDER — ALBUTEROL SULFATE 90 UG/1
2 AEROSOL, METERED RESPIRATORY (INHALATION) EVERY 6 HOURS PRN
Status: DISCONTINUED | OUTPATIENT
Start: 2017-10-12 | End: 2017-10-13 | Stop reason: HOSPADM

## 2017-10-12 RX ORDER — TOPIRAMATE 25 MG/1
100 TABLET ORAL 2 TIMES DAILY
Status: DISCONTINUED | OUTPATIENT
Start: 2017-10-12 | End: 2017-10-13 | Stop reason: HOSPADM

## 2017-10-12 RX ORDER — PANTOPRAZOLE SODIUM 20 MG/1
20 TABLET, DELAYED RELEASE ORAL
Status: DISCONTINUED | OUTPATIENT
Start: 2017-10-13 | End: 2017-10-13 | Stop reason: HOSPADM

## 2017-10-12 RX ORDER — ZOLPIDEM TARTRATE 5 MG/1
10 TABLET ORAL
Status: DISCONTINUED | OUTPATIENT
Start: 2017-10-12 | End: 2017-10-13 | Stop reason: HOSPADM

## 2017-10-12 RX ORDER — ALBUTEROL SULFATE 1.25 MG/3ML
1 SOLUTION RESPIRATORY (INHALATION) EVERY 6 HOURS PRN
COMMUNITY

## 2017-10-12 RX ORDER — DICYCLOMINE HCL 20 MG
10 TABLET ORAL EVERY 6 HOURS PRN
COMMUNITY
End: 2019-06-12 | Stop reason: ALTCHOICE

## 2017-10-12 RX ORDER — RANOLAZINE 500 MG/1
1000 TABLET, EXTENDED RELEASE ORAL 2 TIMES DAILY
Status: DISCONTINUED | OUTPATIENT
Start: 2017-10-12 | End: 2017-10-13 | Stop reason: HOSPADM

## 2017-10-12 RX ORDER — ACETAMINOPHEN 325 MG/1
650 TABLET ORAL EVERY 4 HOURS PRN
Status: DISCONTINUED | OUTPATIENT
Start: 2017-10-12 | End: 2017-10-13 | Stop reason: HOSPADM

## 2017-10-12 RX ORDER — LOSARTAN POTASSIUM 50 MG/1
25 TABLET ORAL DAILY
Status: DISCONTINUED | OUTPATIENT
Start: 2017-10-13 | End: 2017-10-13 | Stop reason: HOSPADM

## 2017-10-12 RX ORDER — INSULIN GLARGINE 100 [IU]/ML
100 INJECTION, SOLUTION SUBCUTANEOUS
Status: DISCONTINUED | OUTPATIENT
Start: 2017-10-12 | End: 2017-10-13 | Stop reason: HOSPADM

## 2017-10-12 RX ORDER — POLYETHYLENE GLYCOL 3350 17 G/17G
17 POWDER, FOR SOLUTION ORAL DAILY
Status: DISCONTINUED | OUTPATIENT
Start: 2017-10-13 | End: 2017-10-13 | Stop reason: HOSPADM

## 2017-10-12 RX ORDER — ALBUTEROL SULFATE 90 UG/1
2 AEROSOL, METERED RESPIRATORY (INHALATION) EVERY 6 HOURS PRN
COMMUNITY
End: 2020-01-31 | Stop reason: HOSPADM

## 2017-10-12 RX ORDER — NITROGLYCERIN 0.4 MG/1
0.4 TABLET SUBLINGUAL
Status: DISCONTINUED | OUTPATIENT
Start: 2017-10-12 | End: 2017-10-13 | Stop reason: HOSPADM

## 2017-10-12 RX ORDER — INSULIN GLARGINE 100 [IU]/ML
110 INJECTION, SOLUTION SUBCUTANEOUS EVERY MORNING
Status: DISCONTINUED | OUTPATIENT
Start: 2017-10-13 | End: 2017-10-12

## 2017-10-12 RX ORDER — ATORVASTATIN CALCIUM 40 MG/1
40 TABLET, FILM COATED ORAL
Status: DISCONTINUED | OUTPATIENT
Start: 2017-10-13 | End: 2017-10-13 | Stop reason: HOSPADM

## 2017-10-12 RX ORDER — MEMANTINE HYDROCHLORIDE 10 MG/1
10 TABLET ORAL DAILY
COMMUNITY
End: 2019-06-04 | Stop reason: ALTCHOICE

## 2017-10-12 RX ORDER — BACLOFEN 10 MG/1
10 TABLET ORAL 3 TIMES DAILY
Status: DISCONTINUED | OUTPATIENT
Start: 2017-10-12 | End: 2017-10-13 | Stop reason: HOSPADM

## 2017-10-12 RX ORDER — ONDANSETRON 2 MG/ML
4 INJECTION INTRAMUSCULAR; INTRAVENOUS EVERY 6 HOURS PRN
Status: DISCONTINUED | OUTPATIENT
Start: 2017-10-12 | End: 2017-10-13 | Stop reason: HOSPADM

## 2017-10-12 RX ORDER — GABAPENTIN 400 MG/1
800 CAPSULE ORAL 2 TIMES DAILY
Status: DISCONTINUED | OUTPATIENT
Start: 2017-10-12 | End: 2017-10-13 | Stop reason: HOSPADM

## 2017-10-12 RX ORDER — MELATONIN
2000 DAILY
Status: DISCONTINUED | OUTPATIENT
Start: 2017-10-13 | End: 2017-10-13 | Stop reason: HOSPADM

## 2017-10-12 RX ORDER — TIZANIDINE 2 MG/1
2 TABLET ORAL 3 TIMES DAILY
Status: DISCONTINUED | OUTPATIENT
Start: 2017-10-12 | End: 2017-10-13 | Stop reason: HOSPADM

## 2017-10-12 RX ORDER — ASPIRIN 81 MG/1
81 TABLET, CHEWABLE ORAL DAILY
Status: DISCONTINUED | OUTPATIENT
Start: 2017-10-13 | End: 2017-10-13 | Stop reason: HOSPADM

## 2017-10-12 RX ORDER — MEMANTINE HYDROCHLORIDE 5 MG/1
10 TABLET ORAL DAILY
Status: DISCONTINUED | OUTPATIENT
Start: 2017-10-13 | End: 2017-10-13 | Stop reason: HOSPADM

## 2017-10-12 RX ORDER — SPIRONOLACTONE 25 MG/1
25 TABLET ORAL DAILY
Status: DISCONTINUED | OUTPATIENT
Start: 2017-10-13 | End: 2017-10-13 | Stop reason: HOSPADM

## 2017-10-12 RX ORDER — ALBUTEROL SULFATE 2.5 MG/3ML
1.25 SOLUTION RESPIRATORY (INHALATION) EVERY 6 HOURS PRN
Status: DISCONTINUED | OUTPATIENT
Start: 2017-10-12 | End: 2017-10-13 | Stop reason: HOSPADM

## 2017-10-12 RX ORDER — METOLAZONE 5 MG/1
5 TABLET ORAL WEEKLY
Status: DISCONTINUED | OUTPATIENT
Start: 2017-10-13 | End: 2017-10-13 | Stop reason: HOSPADM

## 2017-10-12 RX ORDER — CITALOPRAM 20 MG/1
40 TABLET ORAL DAILY
Status: DISCONTINUED | OUTPATIENT
Start: 2017-10-13 | End: 2017-10-13 | Stop reason: HOSPADM

## 2017-10-12 RX ORDER — TORSEMIDE 100 MG/1
100 TABLET ORAL 2 TIMES DAILY
Status: DISCONTINUED | OUTPATIENT
Start: 2017-10-12 | End: 2017-10-13 | Stop reason: HOSPADM

## 2017-10-12 RX ORDER — TAMSULOSIN HYDROCHLORIDE 0.4 MG/1
0.4 CAPSULE ORAL
Status: DISCONTINUED | OUTPATIENT
Start: 2017-10-13 | End: 2017-10-13 | Stop reason: HOSPADM

## 2017-10-12 RX ORDER — ASPIRIN 325 MG
325 TABLET ORAL ONCE
Status: COMPLETED | OUTPATIENT
Start: 2017-10-12 | End: 2017-10-12

## 2017-10-12 RX ORDER — TRAZODONE HYDROCHLORIDE 100 MG/1
100 TABLET ORAL
Status: DISCONTINUED | OUTPATIENT
Start: 2017-10-12 | End: 2017-10-13 | Stop reason: HOSPADM

## 2017-10-12 RX ORDER — LOSARTAN POTASSIUM 25 MG/1
25 TABLET ORAL DAILY
COMMUNITY
End: 2017-11-07 | Stop reason: HOSPADM

## 2017-10-12 RX ORDER — BACLOFEN 10 MG/1
10 TABLET ORAL 3 TIMES DAILY
COMMUNITY

## 2017-10-12 RX ORDER — OXYCODONE HYDROCHLORIDE 10 MG/1
30 TABLET ORAL EVERY 6 HOURS PRN
Status: DISCONTINUED | OUTPATIENT
Start: 2017-10-12 | End: 2017-10-13 | Stop reason: HOSPADM

## 2017-10-12 RX ORDER — DICYCLOMINE HCL 20 MG
10 TABLET ORAL EVERY 6 HOURS PRN
Status: DISCONTINUED | OUTPATIENT
Start: 2017-10-12 | End: 2017-10-13 | Stop reason: HOSPADM

## 2017-10-12 RX ORDER — NEBIVOLOL 10 MG/1
10 TABLET ORAL DAILY
Status: DISCONTINUED | OUTPATIENT
Start: 2017-10-13 | End: 2017-10-13 | Stop reason: HOSPADM

## 2017-10-12 RX ORDER — CLOPIDOGREL BISULFATE 75 MG/1
75 TABLET ORAL DAILY
Status: DISCONTINUED | OUTPATIENT
Start: 2017-10-13 | End: 2017-10-13 | Stop reason: HOSPADM

## 2017-10-12 RX ADMIN — OXYCODONE HYDROCHLORIDE 30 MG: 10 TABLET ORAL at 22:15

## 2017-10-12 RX ADMIN — ASPIRIN 325 MG: 325 TABLET ORAL at 22:18

## 2017-10-12 RX ADMIN — INSULIN GLARGINE 100 UNITS: 100 INJECTION, SOLUTION SUBCUTANEOUS at 22:27

## 2017-10-12 RX ADMIN — HEPARIN SODIUM 5000 UNITS: 5000 INJECTION, SOLUTION INTRAVENOUS; SUBCUTANEOUS at 22:27

## 2017-10-12 RX ADMIN — GABAPENTIN 800 MG: 400 CAPSULE ORAL at 22:18

## 2017-10-12 RX ADMIN — TOPIRAMATE 100 MG: 25 TABLET, FILM COATED ORAL at 22:17

## 2017-10-12 RX ADMIN — MORPHINE SULFATE 105 MG: 60 TABLET, EXTENDED RELEASE ORAL at 22:26

## 2017-10-12 RX ADMIN — POTASSIUM CHLORIDE 20 MEQ: 1500 TABLET, EXTENDED RELEASE ORAL at 22:18

## 2017-10-12 RX ADMIN — TRAZODONE HYDROCHLORIDE 100 MG: 100 TABLET ORAL at 22:18

## 2017-10-12 RX ADMIN — FLUTICASONE PROPIONATE AND SALMETEROL 1 PUFF: 50; 250 POWDER RESPIRATORY (INHALATION) at 23:00

## 2017-10-12 RX ADMIN — TIZANIDINE 2 MG: 2 TABLET ORAL at 22:16

## 2017-10-12 RX ADMIN — RANOLAZINE 1000 MG: 500 TABLET, FILM COATED, EXTENDED RELEASE ORAL at 22:16

## 2017-10-12 RX ADMIN — BACLOFEN 10 MG: 10 TABLET ORAL at 22:18

## 2017-10-12 NOTE — ED PROVIDER NOTES
History  Chief Complaint   Patient presents with    Chest Pain     Pt has intermittant Chest pain x 5 days  Just took a nitro several minutes ago  Pt states he has taken approx 11 nitro today  Pt was at Dr Frandy Solomon office and they did an EKG  Having intermittent CP x 5 days  "It goes across my chest like an eagle "  Relieved with NTG  Pt states he has taken 30 NTG tabs over the past 5 days  Went to the cardiologist today and was referred to the ED "to keep me overnight to run tests "        History provided by:  Patient   used: No    Chest Pain   Pain location:  L chest and R chest  Pain radiates to:  Does not radiate  Pain radiates to the back: no    Onset quality:  Gradual  Duration:  5 days  Timing:  Intermittent  Progression:  Unchanged  Chronicity:  New  Relieved by:  Nitroglycerin  Worsened by:  Nothing tried  Associated symptoms: lower extremity edema (at baseline per pt)    Associated symptoms: no abdominal pain, no back pain, no cough, no diaphoresis, no dizziness, no fever, no nausea, no numbness, no palpitations, no shortness of breath, not vomiting and no weakness    Risk factors: coronary artery disease, diabetes mellitus, high cholesterol, hypertension, male sex and obesity        Prior to Admission Medications   Prescriptions Last Dose Informant Patient Reported? Taking? Empagliflozin (JARDIANCE) 25 MG TABS   Yes Yes   Sig: Take 10 mg by mouth daily     Insulin Degludec (TRESIBA FLEXTOUCH) 200 UNIT/ML SOPN   Yes Yes   Sig: Inject 110 Units under the skin daily at bedtime   TiZANidine (ZANAFLEX) 2 MG capsule  Self Yes Yes   Sig: Take 2 mg by mouth 3 (three) times a day     albuterol (ACCUNEB) 1 25 MG/3ML nebulizer solution   Yes Yes   Sig: Take 1 ampule by nebulization every 6 (six) hours as needed for wheezing   albuterol (PROVENTIL HFA,VENTOLIN HFA) 90 mcg/act inhaler   Yes Yes   Sig: Inhale 2 puffs every 6 (six) hours as needed for wheezing   aspirin 81 MG tablet  Self Yes Yes   Sig: Take 81 mg by mouth daily  atorvastatin (LIPITOR) 40 mg tablet  Self Yes Yes   Sig: Take 40 mg by mouth daily  baclofen 10 mg tablet   Yes Yes   Sig: Take 10 mg by mouth 3 (three) times a day   cholecalciferol (VITAMIN D3) 1,000 units tablet   Yes Yes   Sig: Take 2,000 Units by mouth daily   citalopram (CeleXA) 40 mg tablet  Self Yes Yes   Sig: Take 40 mg by mouth daily  clopidogrel (PLAVIX) 75 mg tablet  Self Yes Yes   Sig: Take 75 mg by mouth daily  dicyclomine (BENTYL) 20 mg tablet   Yes Yes   Sig: Take 10 mg by mouth every 6 (six) hours as needed   fluticasone-salmeterol (ADVAIR) 250-50 mcg/dose  Self Yes Yes   Sig: Inhale 1 puff 2 (two) times a day    gabapentin (NEURONTIN) 800 mg tablet  Self Yes Yes   Sig: Take 800 mg by mouth 2 (two) times a day   insulin aspart (NOVOLOG FLEXPEN) 100 Units/mL SOPN  Self Yes Yes   Sig: Inject 25 Units under the skin 2 (two) times a day     losartan (COZAAR) 25 mg tablet   Yes Yes   Sig: Take 25 mg by mouth daily   lubiprostone (AMITIZA) 24 mcg capsule  Self Yes Yes   Sig: Take 24 mcg by mouth 2 (two) times a day with meals  memantine (NAMENDA) 10 mg tablet   Yes Yes   Sig: Take 10 mg by mouth daily   metolazone (ZAROXOLYN) 5 mg tablet   No Yes   Sig: Take 1 tablet by mouth once a week   morphine (MS CONTIN) 100 mg 12 hr tablet  Self Yes Yes   Sig: Take 100 mg by mouth 2 (two) times a day     morphine 10 mg/mL   Yes No   Sig: Infuse 12 mg into a venous catheter     nebivolol (BYSTOLIC) 10 mg tablet  Self Yes Yes   Sig: Take 10 mg by mouth daily  nitroglycerin (NITROSTAT) 0 4 mg SL tablet  Self Yes Yes   Sig: Place 0 4 mg under the tongue every 5 (five) minutes as needed for chest pain (pt has not  used recently)       omeprazole (PriLOSEC) 20 mg delayed release capsule   Yes Yes   Sig: Take 20 mg by mouth 2 (two) times a day   oxyCODONE (ROXICODONE) 30 MG immediate release tablet  Self Yes Yes   Sig: Take 30 mg by mouth every 6 (six) hours as needed for moderate pain     polyethylene glycol (MIRALAX) 17 g packet  Self Yes Yes   Sig: Take 17 g by mouth daily  potassium chloride (K-DUR,KLOR-CON) 10 mEq tablet   No Yes   Sig: Take 2 tablets by mouth 3 (three) times a day   ranolazine (RANEXA) 1000 MG SR tablet  Self Yes Yes   Sig: Take 1,000 mg by mouth 2 (two) times a day  spironolactone (ALDACTONE) 25 mg tablet   No Yes   Sig: Take 1 tablet by mouth daily for 30 days   tamsulosin (FLOMAX) 0 4 mg  Self Yes Yes   Sig: Take 0 4 mg by mouth daily with dinner  topiramate (TOPAMAX) 100 mg tablet  Self Yes Yes   Sig: Take 100 mg by mouth 2 (two) times a day  torsemide (DEMADEX) 100 mg tablet   No Yes   Sig: Take 1 tablet by mouth 2 (two) times a day   traZODone (DESYREL) 100 mg tablet  Self Yes Yes   Sig: Take 100 mg by mouth daily at bedtime       zolpidem (AMBIEN) 10 mg tablet  Self Yes Yes   Sig: Take 10 mg by mouth daily at bedtime as needed for sleep      Facility-Administered Medications: None       Past Medical History:   Diagnosis Date    Acute on chronic diastolic congestive heart failure (HCC)     Altered gait     Alzheimer disease     per patients,,early onset     [facility] [address] [facility]     Anxiety     Brain aneurysm     Cardiac disease     Chest pain 1/13/2016    Chronic pain disorder     Constipation     Coronary artery disease     CPAP (continuous positive airway pressure) dependence     Dependent on walker for ambulation     Depression     Diabetes mellitus (HCC)     insulin dependent    Enlarged prostate     Heart failure ([address] )     Hiatal hernia     Hypercholesterolemia     Hypertension     MI (myocardial infarction)     Migraine     Neuropathy     Shortness of breath     Sleep apnea     Stented coronary artery     Stroke ([facility] [address] [facility]     vision loss    Urinary frequency     Wears dentures     Wears glasses        Past Surgical History:   Procedure Laterality Date    BACK SURGERY      BRAIN SURGERY      CARDIAC CATHETERIZATION      with stents    CEREBRAL ANEURYSM REPAIR      with coils    ESOPHAGOGASTRODUODENOSCOPY N/A 7/1/2016    Procedure: ESOPHAGOGASTRODUODENOSCOPY (EGD); Surgeon: Kathy Castillo MD;  [facility]; Service:     HERNIA REPAIR      HIATAL HERNIA REPAIR      KNEE ARTHROSCOPY Right     KNEE ARTHROSCOPY Right     PERONEAL NERVE DECOMPRESSION Right     DE ESOPHAGOGASTRODUODENOSCOPY TRANSORAL DIAGNOSTIC N/A 2/27/2017    Procedure: ESOPHAGOGASTRODUODENOSCOPY (EGD); Surgeon: Kathy Castillo MD;  [facility]: [facility]; Service: Gastroenterology    DE INSERT SPINE INFUSN [address] [place] [address] N/A 1/19/2017    Procedure: REMOVAL / Estrella Kirby;  Surgeon: Dania Bland MD;  [facility];  Service: Orthopedics    DE INSERT SPINE INFUSN [address] [place] [address] N/A 5/16/2016    Procedure: REPLACEMENT AND PROGRAM PUMP ;  Surgeon: Dania Bland MD;  [facility];  Service: Orthopedics       Family History   Problem Relation Age of Onset    Family history unknown: Yes     I have reviewed and agree with the history as documented  Social History   Substance Use Topics    Smoking status: Never Smoker    Smokeless tobacco: Never Used    Alcohol use No        Review of Systems   Constitutional: Negative for chills, diaphoresis and fever  Respiratory: Negative for cough, chest tightness and shortness of breath  Cardiovascular: Positive for chest pain  Negative for palpitations and leg swelling  Gastrointestinal: Negative for abdominal pain, nausea and vomiting  Musculoskeletal: Negative for back pain and neck pain  Skin: Negative for pallor  Neurological: Negative for dizziness, syncope, facial asymmetry, speech difficulty, weakness, light-headedness and numbness  All other systems reviewed and are negative        Physical Exam  ED Triage Vitals   Temperature Pulse Respirations Blood Pressure SpO2   10/12/17 1610 10/12/17 1608 10/12/17 1608 10/12/17 1608 10/12/17 1610   98 °F (36 7 °C) 64 16 124/56 (!) 89 %      Temp src Heart Rate Source Patient Position - Orthostatic VS BP Location FiO2 (%)   -- 10/12/17 1709 10/12/17 1709 10/12/17 1608 --    Monitor Sitting Right arm       Pain Score       10/12/17 1608       8           Physical Exam   Constitutional: He is oriented to person, place, and time  He appears well-developed and well-nourished  Non-toxic appearance  He does not have a sickly appearance  He does not appear ill  No distress  HENT:   Head: Normocephalic and atraumatic  Mouth/Throat: Oropharynx is clear and moist    Eyes: EOM are normal  Pupils are equal, round, and reactive to light  Neck: Normal range of motion  Neck supple  No JVD present  Cardiovascular: Normal rate, regular rhythm, S1 normal, S2 normal, normal heart sounds, intact distal pulses and normal pulses  Exam reveals no gallop and no friction rub  No murmur heard  Pulmonary/Chest: Effort normal and breath sounds normal  No respiratory distress  He has no wheezes  He has no rales  He exhibits no tenderness  Abdominal: Soft  Bowel sounds are normal  He exhibits no distension  There is no tenderness  There is no rebound and no guarding  Neurological: He is alert and oriented to person, place, and time  He has normal strength  No cranial nerve deficit or sensory deficit  Skin: Skin is warm and dry  No rash noted  He is not diaphoretic  No pallor  Nursing note and vitals reviewed  ED Medications  Medications - No data to display    Diagnostic Studies  Labs Reviewed   COMPREHENSIVE METABOLIC PANEL - Abnormal        Result Value Ref Range Status    Potassium 3 4 (*) 3 5 - 5 3 mmol/L Final    Creatinine 1 50 (*) 0 60 - 1 30 mg/dL Final    Comment: Standardized to IDMS reference method    Glucose 149 (*) 65 - 140 mg/dL Final    Comment:   If the patient is fasting, the ADA then defines impaired fasting glucose as > 100 mg/dL and diabetes as > or equal to 123 mg/dL    Specimen collection should occur prior to Sulfasalazine administration due to the potential for falsely depressed results  Specimen collection should occur prior to Sulfapyridine administration due to the potential for falsely elevated results  Alkaline Phosphatase 117 (*) 46 - 116 U/L Final    Albumin 2 8 (*) 3 5 - 5 0 g/dL Final    Sodium 139  136 - 145 mmol/L Final    Chloride 102  100 - 108 mmol/L Final    CO2 30  21 - 32 mmol/L Final    Anion Gap 7  4 - 13 mmol/L Final    BUN 22  5 - 25 mg/dL Final    Calcium 8 8  8 3 - 10 1 mg/dL Final    AST 16  5 - 45 U/L Final    Comment:   Specimen collection should occur prior to Sulfasalazine administration due to the potential for falsely depressed results  ALT 30  12 - 78 U/L Final    Comment:   Specimen collection should occur prior to Sulfasalazine administration due to the potential for falsely depressed results  Total Protein 6 9  6 4 - 8 2 g/dL Final    Total Bilirubin 0 46  0 20 - 1 00 mg/dL Final    eGFR 52  ml/min/1 73sq m Final    Narrative:     National Kidney Disease Education Program recommendations are as follows:  GFR calculation is accurate only with a steady state creatinine  Chronic Kidney disease less than 60 ml/min/1 73 sq  meters  Kidney failure less than 15 ml/min/1 73 sq  meters     CBC AND DIFFERENTIAL - Abnormal     WBC 14 03 (*) 4 31 - 10 16 Thousand/uL Final    MCHC 31 3 (*) 31 4 - 37 4 g/dL Final    RDW 16 1 (*) 11 6 - 15 1 % Final    Neutrophils Relative 78 (*) 43 - 75 % Final    Neutrophils Absolute 10 83 (*) 1 85 - 7 62 Thousands/µL Final    RBC 4 61  3 88 - 5 62 Million/uL Final    Hemoglobin 12 9  12 0 - 17 0 g/dL Final    Hematocrit 41 2  36 5 - 49 3 % Final    MCV 89  82 - 98 fL Final    MCH 28 0  26 8 - 34 3 pg Final    MPV 10 3  8 9 - 12 7 fL Final    Platelets 762  020 - 390 Thousands/uL Final    nRBC 0  /100 WBCs Final    Lymphocytes Relative 15  14 - 44 % Final    Monocytes Relative 5  4 - 12 % Final    Eosinophils Relative 2  0 - 6 % Final Basophils Relative 0  0 - 1 % Final    Lymphocytes Absolute 2 13  0 60 - 4 47 Thousands/µL Final    Monocytes Absolute 0 72  0 17 - 1 22 Thousand/µL Final    Eosinophils Absolute 0 32  0 00 - 0 61 Thousand/µL Final    Basophils Absolute 0 03  0 00 - 0 10 Thousands/µL Final   APTT - Abnormal     PTT 22 (*) 23 - 35 seconds Final    Narrative: Therapeutic Heparin Range = 60-90 seconds   PROTIME-INR - Normal    Protime 13 6  12 1 - 14 4 seconds Final    INR 1 04  0 86 - 1 16 Final   POCT TROPONIN - Normal    POC Troponin I 0 00  0 00 - 0 08 ng/ml Final    Specimen Type VENOUS   Final    Narrative:     [employer] i-Stat handheld analyzer 99% cutoff is > 0 08ng/mL in network Emergency Departments    o cTnI 99% cutoff is useful only when applied to patients in the clinical setting of myocardial ischemia  o cTnI 99% cutoff should be interpreted in the context of clinical history, ECG findings and possibly cardiac imaging to establish correct diagnosis  o cTnI 99% cutoff may be suggestive but clearly not indicative of a coronary event without the clinical setting of myocardial ischemia         XR chest portable   ED Interpretation   No active disease                Procedures  ECG 12 Lead Documentation  Date/Time: 10/12/2017 4:16 PM  Performed by: Vonnie Loco by: Benji Christy     Indications / Diagnosis:  Chest pain  ECG reviewed by me, the ED Provider: yes    Patient location:  Bedside  Previous ECG:     Previous ECG:  Compared to current    Comparison ECG info:  9/6/2017  Rate:     ECG rate:  57    ECG rate assessment: bradycardic    Rhythm:     Rhythm: sinus bradycardia    Ectopy:     Ectopy: none    QRS:     QRS axis:  Normal    QRS intervals:  Normal  ST segments:     ST segments:  Normal  T waves:     T waves: non-specific            Phone Contacts  ED Phone Contact    ED Course  ED Course as of Oct 12 1811   Thu Oct 12, 2017   [id number] SLIM paged    [id number] SLIM repaged          HEART Risk Score Flowsheet Row Most Recent Value   History  0 Filed at: 10/12/2017 1735   ECG  1 Filed at: 10/12/2017 1735   Age  1 Filed at: 10/12/2017 1735   Risk Factors  2 Filed at: 10/12/2017 1735   Troponin  0 Filed at: 10/12/2017 1735   Heart Score Risk Calculator   History  0 Filed at: 10/12/2017 1735   ECG  1 Filed at: 10/12/2017 1735   Age  1 Filed at: 10/12/2017 1735   Risk Factors  2 Filed at: 10/12/2017 1735   Troponin  0 Filed at: 10/12/2017 1735   HEART Score  4 Filed at: 10/12/2017 1735   HEART Score  4 Filed at: 10/12/2017 1735                            MDM  Number of Diagnoses or Management Options  Diagnosis management comments: CP - Will check CBC to r/o anemia, CMP to r/o lyte abnormality, troponin to assess for NSTEMI, EKG to r/o STEMI/ischemic changes, CXR to r/o PTX/PNA/pulmonary edema/effusion  Amount and/or Complexity of Data Reviewed  Clinical lab tests: ordered and reviewed  Tests in the radiology section of CPT®: ordered and reviewed  Tests in the medicine section of CPT®: reviewed and ordered      CritCare Time    Disposition  Final diagnoses:   Chest pain     ED Disposition     ED Disposition Condition Comment    Admit  Case was discussed with Lidya and the patient's admission status was agreed to be Admission Status: observation status to the service of Dr Ricardo Ge   Follow-up Information    None       Patient's Medications   Discharge Prescriptions    No medications on file     No discharge procedures on file      ED Provider  Electronically Signed by       Geno Del Angel 26, DO  10/12/17 5143

## 2017-10-13 ENCOUNTER — GENERIC CONVERSION - ENCOUNTER (OUTPATIENT)
Dept: OTHER | Facility: OTHER | Age: 57
End: 2017-10-13

## 2017-10-13 VITALS
WEIGHT: 315 LBS | HEART RATE: 60 BPM | HEIGHT: 74 IN | RESPIRATION RATE: 16 BRPM | TEMPERATURE: 98.2 F | DIASTOLIC BLOOD PRESSURE: 60 MMHG | OXYGEN SATURATION: 90 % | BODY MASS INDEX: 40.43 KG/M2 | SYSTOLIC BLOOD PRESSURE: 130 MMHG

## 2017-10-13 LAB
ANION GAP SERPL CALCULATED.3IONS-SCNC: 7 MMOL/L (ref 4–13)
ATRIAL RATE: 55 BPM
ATRIAL RATE: 56 BPM
BUN SERPL-MCNC: 20 MG/DL (ref 5–25)
CALCIUM SERPL-MCNC: 8.7 MG/DL (ref 8.3–10.1)
CHLORIDE SERPL-SCNC: 106 MMOL/L (ref 100–108)
CO2 SERPL-SCNC: 27 MMOL/L (ref 21–32)
CREAT SERPL-MCNC: 1.31 MG/DL (ref 0.6–1.3)
GFR SERPL CREATININE-BSD FRML MDRD: 61 ML/MIN/1.73SQ M
GLUCOSE SERPL-MCNC: 100 MG/DL (ref 65–140)
GLUCOSE SERPL-MCNC: 107 MG/DL (ref 65–140)
GLUCOSE SERPL-MCNC: 129 MG/DL (ref 65–140)
GLUCOSE SERPL-MCNC: 99 MG/DL (ref 65–140)
MAGNESIUM SERPL-MCNC: 3 MG/DL (ref 1.6–2.6)
P AXIS: 26 DEGREES
P AXIS: 40 DEGREES
POTASSIUM SERPL-SCNC: 3.4 MMOL/L (ref 3.5–5.3)
PR INTERVAL: 202 MS
PR INTERVAL: 210 MS
QRS AXIS: -16 DEGREES
QRS AXIS: -7 DEGREES
QRSD INTERVAL: 88 MS
QRSD INTERVAL: 90 MS
QT INTERVAL: 466 MS
QT INTERVAL: 488 MS
QTC INTERVAL: 449 MS
QTC INTERVAL: 466 MS
SODIUM SERPL-SCNC: 140 MMOL/L (ref 136–145)
T WAVE AXIS: 26 DEGREES
T WAVE AXIS: 3 DEGREES
TROPONIN I SERPL-MCNC: <0.02 NG/ML
TROPONIN I SERPL-MCNC: <0.02 NG/ML
VENTRICULAR RATE: 55 BPM
VENTRICULAR RATE: 56 BPM

## 2017-10-13 PROCEDURE — 94660 CPAP INITIATION&MGMT: CPT

## 2017-10-13 PROCEDURE — 83735 ASSAY OF MAGNESIUM: CPT | Performed by: PHYSICIAN ASSISTANT

## 2017-10-13 PROCEDURE — G0009 ADMIN PNEUMOCOCCAL VACCINE: HCPCS | Performed by: INTERNAL MEDICINE

## 2017-10-13 PROCEDURE — 90686 IIV4 VACC NO PRSV 0.5 ML IM: CPT | Performed by: INTERNAL MEDICINE

## 2017-10-13 PROCEDURE — 84484 ASSAY OF TROPONIN QUANT: CPT | Performed by: PHYSICIAN ASSISTANT

## 2017-10-13 PROCEDURE — 80048 BASIC METABOLIC PNL TOTAL CA: CPT | Performed by: PHYSICIAN ASSISTANT

## 2017-10-13 PROCEDURE — 82948 REAGENT STRIP/BLOOD GLUCOSE: CPT

## 2017-10-13 PROCEDURE — 93005 ELECTROCARDIOGRAM TRACING: CPT | Performed by: PHYSICIAN ASSISTANT

## 2017-10-13 PROCEDURE — 93005 ELECTROCARDIOGRAM TRACING: CPT

## 2017-10-13 PROCEDURE — 90670 PCV13 VACCINE IM: CPT | Performed by: INTERNAL MEDICINE

## 2017-10-13 PROCEDURE — G0008 ADMIN INFLUENZA VIRUS VAC: HCPCS | Performed by: INTERNAL MEDICINE

## 2017-10-13 RX ORDER — ISOSORBIDE MONONITRATE 30 MG/1
30 TABLET, EXTENDED RELEASE ORAL DAILY
Status: DISCONTINUED | OUTPATIENT
Start: 2017-10-13 | End: 2017-10-13 | Stop reason: HOSPADM

## 2017-10-13 RX ORDER — ISOSORBIDE MONONITRATE 30 MG/1
30 TABLET, EXTENDED RELEASE ORAL DAILY
Qty: 30 TABLET | Refills: 0 | Status: SHIPPED | OUTPATIENT
Start: 2017-10-13 | End: 2019-06-04 | Stop reason: ALTCHOICE

## 2017-10-13 RX ORDER — METOLAZONE 5 MG/1
5 TABLET ORAL WEEKLY
Qty: 5 TABLET | Refills: 0 | Status: SHIPPED | OUTPATIENT
Start: 2017-10-13 | End: 2017-11-07 | Stop reason: HOSPADM

## 2017-10-13 RX ADMIN — NITROGLYCERIN 0.4 MG: 0.4 TABLET SUBLINGUAL at 09:44

## 2017-10-13 RX ADMIN — INSULIN LISPRO 20 UNITS: 100 INJECTION, SOLUTION INTRAVENOUS; SUBCUTANEOUS at 09:01

## 2017-10-13 RX ADMIN — LOSARTAN POTASSIUM 25 MG: 50 TABLET, FILM COATED ORAL at 08:58

## 2017-10-13 RX ADMIN — OXYCODONE HYDROCHLORIDE 30 MG: 10 TABLET ORAL at 05:50

## 2017-10-13 RX ADMIN — MORPHINE SULFATE 105 MG: 60 TABLET, EXTENDED RELEASE ORAL at 08:57

## 2017-10-13 RX ADMIN — NEBIVOLOL HYDROCHLORIDE 10 MG: 10 TABLET ORAL at 08:58

## 2017-10-13 RX ADMIN — MEMANTINE 10 MG: 5 TABLET ORAL at 08:56

## 2017-10-13 RX ADMIN — POTASSIUM CHLORIDE 20 MEQ: 1500 TABLET, EXTENDED RELEASE ORAL at 08:57

## 2017-10-13 RX ADMIN — OXYCODONE HYDROCHLORIDE 30 MG: 10 TABLET ORAL at 12:38

## 2017-10-13 RX ADMIN — GABAPENTIN 800 MG: 400 CAPSULE ORAL at 08:56

## 2017-10-13 RX ADMIN — BACLOFEN 10 MG: 10 TABLET ORAL at 08:57

## 2017-10-13 RX ADMIN — FLUTICASONE PROPIONATE AND SALMETEROL 1 PUFF: 50; 250 POWDER RESPIRATORY (INHALATION) at 09:06

## 2017-10-13 RX ADMIN — POLYETHYLENE GLYCOL 3350 17 G: 17 POWDER, FOR SOLUTION ORAL at 09:10

## 2017-10-13 RX ADMIN — CLOPIDOGREL BISULFATE 75 MG: 75 TABLET ORAL at 08:58

## 2017-10-13 RX ADMIN — TORSEMIDE 100 MG: 100 TABLET ORAL at 09:00

## 2017-10-13 RX ADMIN — PNEUMOCOCCAL 13-VALENT CONJUGATE VACCINE 0.5 ML: 2.2; 2.2; 2.2; 2.2; 2.2; 4.4; 2.2; 2.2; 2.2; 2.2; 2.2; 2.2; 2.2 INJECTION, SUSPENSION INTRAMUSCULAR at 16:02

## 2017-10-13 RX ADMIN — LUBIPROSTONE 24 MCG: 24 CAPSULE, GELATIN COATED ORAL at 09:01

## 2017-10-13 RX ADMIN — PANTOPRAZOLE SODIUM 20 MG: 20 TABLET, DELAYED RELEASE ORAL at 05:45

## 2017-10-13 RX ADMIN — SPIRONOLACTONE 25 MG: 25 TABLET, FILM COATED ORAL at 08:57

## 2017-10-13 RX ADMIN — TIZANIDINE 2 MG: 2 TABLET ORAL at 08:57

## 2017-10-13 RX ADMIN — HEPARIN SODIUM 5000 UNITS: 5000 INJECTION, SOLUTION INTRAVENOUS; SUBCUTANEOUS at 05:45

## 2017-10-13 RX ADMIN — CITALOPRAM HYDROBROMIDE 40 MG: 20 TABLET ORAL at 08:58

## 2017-10-13 RX ADMIN — METOLAZONE 5 MG: 5 TABLET ORAL at 09:00

## 2017-10-13 RX ADMIN — INFLUENZA VIRUS VACCINE 0.5 ML: 15; 15; 15; 15 SUSPENSION INTRAMUSCULAR at 16:03

## 2017-10-13 RX ADMIN — RANOLAZINE 1000 MG: 500 TABLET, FILM COATED, EXTENDED RELEASE ORAL at 08:57

## 2017-10-13 RX ADMIN — INSULIN LISPRO 20 UNITS: 100 INJECTION, SOLUTION INTRAVENOUS; SUBCUTANEOUS at 12:38

## 2017-10-13 RX ADMIN — VITAMIN D, TAB 1000IU (100/BT) 2000 UNITS: 25 TAB at 08:58

## 2017-10-13 RX ADMIN — ASPIRIN 81 MG 81 MG: 81 TABLET ORAL at 08:57

## 2017-10-13 RX ADMIN — TOPIRAMATE 100 MG: 25 TABLET, FILM COATED ORAL at 08:57

## 2017-10-13 NOTE — PLAN OF CARE
Problem: CARDIOVASCULAR - ADULT  Goal: Absence of cardiac dysrhythmias or at baseline rhythm  INTERVENTIONS:  - Continuous cardiac monitoring, monitor vital signs, obtain 12 lead EKG if indicated  - Monitor electrolytes and administer replacement therapy as ordered   Outcome: Progressing

## 2017-10-13 NOTE — PROGRESS NOTES
Assessment  Assessed    1  Chest pain (786 50) (R07 9)   2  Chronic diastolic CHF (congestive heart failure) (428 32,428 0) (I50 32)   3  Benign essential hypertension (401 1) (I10)    Plan  Chest pain    · EKG/ECG- POC; Status:Complete;   Done: 44NXZ3779 01:49PM   Perform: In Office; Last Updated Erik Gomez; 10/12/2017 1:49:55 PM;Ordered; For:Chest pain; Ordered By:Jesus Watts;    Discussion/Summary  Cardiology Discussion Summary Free Text Note Form [facility]: This patient presents today for an urgent evaluation for chest pain  His chest pain started at 8 in the morning while he was in bed, and resolved after 2 sublingual nitroglycerin  It has been recurrent almost every hour for which she has been taking 2-3 sublingual nitroglycerin, with subsequent resolution  At this time he is completely pain-free  ECG reveals no ischemic ECG abnormalities  I get the sense that his chest pain is noncardiac  He does however have a significant number of risk factors, and in light of recurrent chest pain that is refractory to sublingual nitroglycerin, I recommended he go to the emergency department  serial ECG and troponin are within normal limits, it would be very low probability that his chest pain is cardiac in etiology   Although he has mild lower extremity edema, his weight seems stable without increased exertional dyspnea 2 point toward decompensated heart failure  His blood pressure is slightly low, likely secondary to multiple doses of sublingual nitroglycerin taken earlier today  The patient is agreeable to going to the emergency department  Chief Complaint  Chief Complaint Free Text Note Form: OV per Neurology Chest Pain      History of Present Illness  Cardiology HPI Free Text Note Form [facility]: This is a 53-year-old male with a history of myocardial infarction in 2012 at which time he received a bare metal stent to the RCA   In January 2016, he received a drug eluting stent to the mid LAD, 1st obtuse marginal, and was noted to have a 99 percent distal LAD stenosis  There was an unsuccessful attempt at angioplasty of the distal vessel, possibly complicated by a focal dissection, and has been medically managed  In April 2017 he was hospitalized for noncardiac chest pain, and thereafter in May 2017 for congestive heart failure  He was last seen by Dr Amador Sagastume in September 2017, at which time he was doing well  His torsemide was increased to 250 milligrams daily, from 200 milligrams daily in divided doses, with maintenance of weekly metolazone at 5 milligrams, as well as spironolactone  Dual anti-platelet treatment was continued along with antianginals including beta blocker and Ranexa  Our office received a call from his neurologist office, stating that he had chest pain today, and was taking nitroglycerin tablets, requesting an urgent evaluation  Upon further questioning, he states he has been having chest pain for the past 5 days, and today, already has taken 11 sublingual nitroglycerin tablets with chest pain occurring almost every hour and prompted him to take 2-3 nitroglycerin tablets  He states exertion and emotional distress will exacerbate his chest pain, however, it has been mostly occurring at rest today  He states his day has been very stressful because of his wife violeta at me  His weight seems fairly stable without increased exertional dyspnea/edema  Review of Systems  Cardiology Male ROS:     Cardiac: chest pain-and-has swelling in the , but-no palpitations present  Respiratory: shortness of breath  Neurological: no dizziness       ROS Reviewed:   ROS reviewed  Active Problems  Problems    1  Abdominal pain (789 00) (R10 9)   2  Angina pectoris (413 9) (I20 9)   3  Arteriosclerosis of coronary artery (414 00) (I25 10)   4  Ascites (789 59) (R18 8)   5  Atherosclerosis (440 9) (I70 90)   6  Atherosclerosis of arteries of extremities (440 20) (I70 209)   7   Benign essential hypertension (401  1) (I10)   8  Benign prostatic hyperplasia (BPH) with post-void dribbling (600 01,788 35)   (N40 1,N39 43)   9  Brain aneurysm (437 3) (I67 1)   10  Breast pain (611 71) (N64 4)   11  Bronchitis (490) (J40)   12  Chest pain (786 50) (R07 9)   13  Chronic constipation (564 00) (K59 09)   14  Chronic diastolic CHF (congestive heart failure) (428 32,428 0) (I50 32)   15  Chronic kidney disease, stage 3 (585 3) (N18 3)   16  Chronic migraine without aura (346 70) (G43 709)   17  CVA (cerebral vascular accident) (434 91) (I63 9)   18  Depression (311) (F32 9)   19  Dysphagia (787 20) (R13 10)   20  Edema (782 3) (R60 9)   21  Epigastric pain (789 06) (R10 13)   22  GERD without esophagitis (530 81) (K21 9)   23  Hiatal hernia (553 3) (K44 9)   24  History of aneurysm (V12 59) (Z86 79)   25  History of CVA (cerebrovascular accident) (V12 54) (Z86 73)   32  Hypercholesterolemia (272 0) (E78 00)   27  Hypokalemia (276 8) (E87 6)   28  Insomnia (780 52) (G47 00)   29  Joint pain, knee (719 46) (M25 569)   30  Lumbago (724 2) (M54 5)   31  Medication overuse headache (339 3) (G44 40)   32  Memory disturbance (780 93) (R41 3)   33  Morbid or severe obesity due to excess calories (278 01) (E66 01)   34  Need for immunization against influenza (V04 81) (Z23)   35  Obstructive sleep apnea (327 23) (G47 33)   36  Other chronic pain (338 29) (G89 29)   37  Peripheral neuropathy (356 9) (G62 9)   38  Preop examination (V72 84) (Z01 818)   39  Preoperative cardiovascular examination (V72 81) (Z01 810)   40  Shortness of breath (786 05) (R06 02)   41  Stabbing headache (339 85) (G44 85)   42  Status post cardiac catheterization (V45 89) (Z98 890)   43  Tear of medial meniscus of right knee (836 0) (S83 241A)   44  Uncontrolled type 2 diabetes mellitus, with long-term current use of insulin    (250 02,V58 67) (E11 65,Z79 4)   45  Venous Intermittent Claudication (453 89)   46  Vitamin D deficiency (268 9) (E55 9)   47   Wheezing on auscultation (786 07) (R06 2)    Past Medical History  Problems    1  History of Coronary Artery Disease (V12 59)   2  History of Femoral Nerve Palsy On The Right (355 2)   3  History of acute bronchitis (V12 69) (Z87 09)   4  History of acute sinusitis (V12 69) (Z87 09)   5  History of Need for prophylactic vaccination and inoculation against influenza (V04 81)   (Z23)   6  Old myocardial infarction (412) (I25 2)   7  History of Stroke Syndrome (436)  Active Problems And Past Medical History Reviewed: The active problems and past medical history were reviewed and updated today  Surgical History  Problems    1  History of Brain Surgery   2  History of Neurol Drug Infusion Implantation Of Programmable Pump   3  History of Stent Indications: Restenosis At Previous PTCA Location  Surgical History Reviewed: The surgical history was reviewed and updated today  Family History  Mother    1  Family history of Diabetes Mellitus (V18 0)   2  Denied: Family history of colitis   3  Denied: Family history of colonic polyps   4  Denied: Family history of Crohn's disease   5  Denied: Family history of liver disease   6  Denied: Family history of Malignant neoplasm of colon, unspecified part of colon  Father    7  Family history of Coronary Artery Disease (V17 49)   8  Denied: Family history of colitis   9  Denied: Family history of colonic polyps   10  Denied: Family history of Crohn's disease   6  Denied: Family history of liver disease   12  Denied: Family history of Malignant neoplasm of colon, unspecified part of colon  Brother    15  Family history of diabetes mellitus (V18 0) (Z83 3)  Family History    14  Family history of arthritis (V17 7) (Z82 61)   15  Family history of malignant neoplasm (V16 9) (Z80 9)   16  Family history of Father  At Age 39  Family History Reviewed: The family history was reviewed and updated today         Social History  Problems    · Being A Social Drinker   · Daily caffeine consumption, 2-3 servings a day   · Denied: History of Drug use   · Never A Smoker  Social History Reviewed: The social history was reviewed and updated today  Current Meds   1  Advair Diskus 250-50 MCG/DOSE Inhalation Aerosol Powder Breath Activated; Therapy: 24RGI8186 to Recorded   2  Aldactone 25 MG Oral Tablet; Take 1 tablet daily; Therapy: 09Dbo4616 to Recorded   3  Amitiza 24 MCG Oral Capsule; TAKE ONE CAPSULE BY MOUTH TWICE A DAY; Therapy: 82NPX8079 to (Evaluate:92Hgs4352)  Requested for: 54XZQ9571; Last   Rx:26Nov2014 Ordered   4  AmLODIPine Besylate 2 5 MG Oral Tablet; TAKE 1 TABLET DAILY; Therapy: 80CTV3275 to (Ana Hope)  Requested for: 29DAP4676; Last   Rx:03Oct2017 Ordered   5  Aspirin 81 MG TABS; TAKE 1 TABLET DAILY; Therapy: 24GYT1064 to (Evaluate:14Djs2021) Recorded   6  Atorvastatin Calcium 40 MG Oral Tablet; TAKE 1 TABLET AT BEDTIME; Therapy: 86GVB8032 to (0699 164 39 35)  Requested for: 60RWK7402; Last   Rx:65Ynj7791 Ordered   7  Baclofen 10 MG Oral Tablet; TAKE 1 TABLET 3 times daily; Therapy: 09WIW2491 to (Evaluate:10Mar2015)  Requested for: 13Mbi4590; Last   Rx:65Mer5969 Ordered   8  Bystolic 10 MG Oral Tablet; take 1 tablet by mouth daily; Therapy: 72PTL9749 to (Evaluate:01Pvz5908)  Requested for: 09SQL9357; Last   Rx:15Mar2017 Ordered   9  Citalopram Hydrobromide 40 MG Oral Tablet; TAKE 1 TABLET BY MOUTH EVERY DAY; Therapy: 80ABF4071 to (Qamar Wheeler)  Requested for: 48RNV5370; Last   Rx:26Nov2014 Ordered   10  Clopidogrel Bisulfate 75 MG Oral Tablet; take 1 tablet by mouth every day; Therapy: 46BFV8261 to (Evaluate:05Jbt3714)  Requested for: 77FYM1255; Last    Rx:09Kdo8100 Ordered   11  Dicyclomine HCl - 10 MG Oral Capsule; TAKE 1 CAPSULE EVERY 6 HOURS AS    NEEDED; Therapy: 72LOL2014 to (Evaluate:71Lfx7050)  Requested for: 94RRY4715; Last    FR:60OTS9458 Ordered   12  Flomax 0 4 MG Oral Capsule; TAKE 1 CAPSULE Daily;     Therapy: 76JXJ3878 to Recorded   13  Gabapentin 800 MG Oral Tablet; Take 2 tablets daily as directed; Therapy: 75Kuo1291 to Recorded   14  Jardiance 25 MG Oral Tablet; 1 Tab daily; Therapy: 26SEQ4120 to (Evaluate:06Oct2018); Last Rx:43Iyy9532 Ordered   15  Memantine HCl - 10 MG Oral Tablet; one tab in am for 10 days then twice a day afterthat; Therapy: 97RSI4017 to (Last Rx:06Jun2017)  Requested for: 06Jun2017 Ordered   16  MetOLazone 5 MG Oral Tablet; 1 tablet weekly; Therapy: 49Biw5928 to Recorded   17  MS Contin 100 MG Oral Tablet Extended Release; 1 TABLET TWICE DAILY; Therapy: 64LUA9194 to Recorded   18  Nitrostat 0 4 MG Sublingual Tablet Sublingual; TAKE 1 TABLET SUBLINGUALLY AS    NEEDED; Therapy: 05VRY7308 to (Evaluate:86Caz4089)  Requested for: 00XSA4266; Last    Rx:85Dmu0285 Ordered   19  NovoLOG FlexPen 100 UNIT/ML Subcutaneous Solution Pen-injector; 25 units with    breakfast and lunch and 30 units before dinner  Requested for: 27Jun2017;    Last SI:37MBF7710 Ordered   20  Omeprazole 20 MG Oral Tablet Delayed Release; 1 PO BID; Therapy: 41RUA3727 to (Last Rx:05Snv8848)  Requested for: 30Dly2750 Ordered   21  OxyCODONE HCl - 30 MG Oral Tablet; Therapy: 89CTA8163 to (Last Rx:02Apr2011)  Requested for: 50Uoa2355 Ordered   22  Potassium Chloride ER 20 MEQ Oral Tablet Extended Release; 1 tablet 3 times daily; Therapy: 55Zub9952 to Recorded   23  Qvar 80 MCG/ACT Inhalation Aerosol Solution; INHALE 1 PUFF TWICE DAILY; Therapy: 36WZQ7058 to (Last Rx:26Mar2016)  Requested for: 48RJL7538 Ordered   24  Ranexa 1000 MG Oral Tablet Extended Release 12 Hour; take 1 tablet every 12 hours; Therapy: 80QNF6899 to (Evaluate:53Rmj4355)  Requested for: 19NTZ7704; Last    Rx:15Mar2017 Ordered   25  Topiramate 100 MG Oral Tablet; TAKE 1 TABLET IN AM and Two at bedtime; Therapy: 34UDQ8654 to (Evaluate:01Jun2018)  Requested for: 63ONU3877; Last    XR:76ALC0601 Ordered   26   Torsemide 100 MG Oral Tablet; 2 5 tablets daily in divided dosages; Therapy: 58Qec4496 to (Evaluate:11Mar2018)  Requested for: 43Elh7080; Last    Rx:45Ggl1109 Ordered   27  TraZODone HCl - 100 MG Oral Tablet; TAKE 1-3 TABLETS AT BEDTIME AS NEEDED FOR    DIFFICULTY SLEEPING; Therapy: 74QAT4936 to (Evaluate:36Aws1298)  Requested for: 63BLA2173; Last    Rx:39Klt1133 Ordered   28  Hanane George FlexTouch 200 UNIT/ML Subcutaneous Solution Pen-injector; 110 units daily at    bedtime; Therapy: 45EMR7580 to (Evaluate:25Jba5548)  Requested for: 16FXB1043; Last    UR:10ATR3953 Ordered   29  Vitamin D3 5000 UNIT Oral Capsule; 1 Cap daily; Therapy: 59KZQ6227 to (77 873 135)  Requested for: 37BKD4449; Last    Rx:30Nkv1359 Ordered   27  Zolpidem Tartrate 10 MG Oral Tablet; TAKE 1 TABLET AT BEDTIME AS NEEDED FOR    SLEEP; Therapy: 05FKK7992 to (Evaluate:19Nov2014)  Requested for: 10XYT0759; Last    MF:73DWB3875; Status: ACTIVE - Renewal Voided Ordered  Medication List Reviewed: The medication list was reviewed and updated today  Allergies  Medication    1  No Known Drug Allergies  Non-Medication    2  No Known Environmental Allergies   3  No Known Food Allergies    Vitals  Vital Signs    Recorded: 99CRN1295 01:46PM   Heart Rate 54   Respiration 18   Systolic 98, LUE, Sitting   Diastolic 68, LUE, Sitting   BP CUFF SIZE Large   Height 6 ft 2 in   Weight 369 lb    BMI Calculated 47 38   BSA Calculated 2 82     Physical Exam    Constitutional - General appearance: No acute distress, well appearing and well nourished  Neck - Normal, no JVD  -Obe  Pulmonary - Respiratory effort: No signs of respiratory distress  -Auscultation of lungs: Clear to auscultation  Cardiovascular - Auscultation of heart: Normal rate and rhythm, normal S1 and S2, no murmurs  -dista  -Examination of extremities for edema and/or varicosities: Abnormal  -mild lower extremity edema  Abdomen -   Abdomen: Abnormal  -significant central obesity     Musculoskeletal - Gait and station: Abnormal-ambulates with a walker  Skin -   Skin and subcutaneous tissue: Abnormal-thickened  Psychiatric - Orientation to person, place, and time: Normal -Mood and affect: Normal       Results/Data  ECG Report: sinus bradycardia, no ischemic ST segment abnormality      Future Appointments    Date/Time Provider Specialty Site   01/15/2018 02:00 PM SALVADOR Goodson  Endocrinology [facility] ENDOCRINOLOGY   10/18/2017 02:15 PM Jimmy Patel RD, LDN Diabetes Educator [facility] ENDOCRINOLOGY   10/31/2017 11:40 AM SALVADOR Pitts   Cardiology  CARDIOLOGY  [place]   10/30/2017 12:00 PM Nadine Farncois MD Neurology [facility]   01/22/2018 09:30 AM Nadine Francois MD Neurology [facility]     Signatures   Electronically signed by : Herminia Gamez DO; Oct 12 2017  2:41PM EST                       (Author)

## 2017-10-13 NOTE — PLAN OF CARE
Problem: CARDIOVASCULAR - ADULT  Goal: Maintains optimal cardiac output and hemodynamic stability  INTERVENTIONS:  - Monitor I/O, vital signs and rhythm  - Monitor for S/S and trends of decreased cardiac output i e  bleeding, hypotension  - Assess quality of pulses, skin color and temperature  - Assess for signs of decreased coronary artery perfusion - ex   Angina  - Instruct patient to report change in severity of symptoms  Outcome: Progressing

## 2017-10-13 NOTE — DISCHARGE SUMMARY
Discharge Summary - Medical Oliverio Saavedra 49 y o  male MRN: [id number]    [address] [facility] MED SURG Room / Bed: [place] 2 /E2 -* Encounter: [id number]    BRIEF OVERVIEW    Admitting Provider: Paige Bay DO  Discharge Provider: Paige Bay DO  Admission Date: 10/12/2017     Discharge Date: No discharge date for patient encounter  Primary Care Physician at Discharge: Sravanthi Hensley [phone]    Primary Discharge Diagnosis  Principal Problem:    Chest pain  Active Problems:    Hypertension    Hypercholesterolemia    Chronic diastolic congestive heart failure (HCC)    Coronary artery disease involving native coronary artery    Morbid obesity (HCC)    Obstructive sleep apnea syndrome    Hypokalemia    Chronic pain disorder    Diabetes mellitus (HCC)    Chronic respiratory failure (HCC)    Stage 3 chronic kidney disease    Leukocytosis  Resolved Problems:    * No resolved hospital problems   *      Other Problems Addressed  Patient Active Problem List    Diagnosis Date Noted    Chest pain 10/12/2017    Stage 3 chronic kidney disease 10/12/2017    Leukocytosis 10/12/2017    Dyspnea on exertion 09/05/2017    Bilateral leg edema 09/05/2017    Chronic respiratory failure ([facility] [address] ) 09/05/2017    Acute tracheobronchitis 05/10/2017    Alzheimer disease     Acute on chronic diastolic congestive heart failure (HCC)     Chronic pain disorder     Constipation     Coronary artery disease     Diabetes mellitus ([facility] [address] )     Migraine     Sleep apnea     Hypokalemia 10/14/2016    Stroke ([facility] [address] )     Stented coronary artery     Obstructive sleep apnea syndrome     MI (myocardial infarction)     Depression     CPAP (continuous positive airway pressure) dependence     Brain aneurysm     CHF (congestive heart failure) ([facility] [address] )     Morbid obesity ([facility] [address] ) 01/15/2016    CVA (cerebral vascular accident) ([facility] [address] ) 01/15/2016    Coronary artery disease involving native coronary artery 01/14/2016    Hypertension     Hypercholesterolemia     Type 2 diabetes mellitus with renal complication (HCC)     Chronic diastolic congestive heart failure ([facility] [address] )        Consulting Providers   * cardiology Dr Leanna Marino **      Discharge Disposition: home    Allergies  No Known Allergies  Diet restrictions:  diabetic low-salt diet   Activity restrictions:  None  Discharge Condition:  Carmella Ala  Dr Estela Calhoun in one week  Follow up with consulting providers  Dr Aubree Khalil in one week           [facility]    Presenting Problem/History of Present Illness  Chest pain  Hospital Course  45-year-old male presents with chest pain  Chest pain  patient with known coronary artery disease with multiple risk factors admitted for cardiac monitoring and consult Cardiology  Troponins less than 0 2 and Cardiology felt chest pain was recorded correlates with ventricular bigeminy  With negative biomarkers and no ischemic EKG changes the patient is discharged home is to follow up with Dr Maria Victoria Aggarwal to consider an outpatient Holter  Isordil was also added to his drug management to decrease his use of nitroglycerin  This was explained to the patient and he understands  Discharge  Statement   I spent 22 minutes discharging the patient  This time was spent on the day of discharge  I had direct contact with the patient on the day of discharge  Additional documentation is required if more than 30 minutes were spent on discharge  Discussed with Cardiology    Discharge instructions/Information to patient and family:   See after visit summary for information provided to patient and family

## 2017-10-13 NOTE — PROGRESS NOTES
Pt c/o substernal CP that radiated down left arm, VS taken 101/57, 59, 20, 92% on 2L, SL nitro given, and EKG done (sinus carlos at 55)  CP resolved within 2 minutes  EKG showed to admitting PA, prn morphine ordered in addition to SL nitro

## 2017-10-13 NOTE — PLAN OF CARE
Problem: Potential for Falls  Goal: Patient will remain free of falls  INTERVENTIONS:  - Assess patient frequently for physical needs  -  Identify cognitive and physical deficits and behaviors that affect risk of falls    -  [place] fall precautions as indicated by assessment   - Educate patient/family on patient safety including physical limitations  - Instruct patient to call for assistance with activity based on assessment  - Modify environment to reduce risk of injury  - Consider OT/PT consult to assist with strengthening/mobility   Outcome: Progressing

## 2017-10-13 NOTE — H&P
History and Physical - [facility] Internal Medicine    Patient Information: Delbert Moran 49 y o  male MRN: [id number]  Unit/Bed#: E2 -01 Encounter: [id number]  Admitting Physician: Song Raya PA-C  PCP: Teo Somers MD  Date of Admission:  10/12/17    Assessment/Plan:    Hospital Problem List:     Principal Problem:    Chest pain  Active Problems:    Hypertension    Hypercholesterolemia    Chronic diastolic congestive heart failure ([facility] )    Coronary artery disease involving native coronary artery    Morbid obesity ([facility] )    Obstructive sleep apnea syndrome    Hypokalemia    Chronic pain disorder    Diabetes mellitus (HCC)    Chronic respiratory failure ([facility])    Stage 3 chronic kidney disease    Leukocytosis      Plan for the Primary Problem(s):  · Chest pain  · ACS rule out  · EKG w/o ST changes or T wave inversions in 2+ leads, no LBBB, CXR, negative for dissection, POC trop 0  · As pt has known disease as noted below as documented below and given increasing frequency pt may benefit from NM stress vs stress TTE  · Will give full dose asa x1  · Will consult cardiology for further consideration vs adjustment in OP anginal medications  · Continue to monitor on telemetry and trend troponins    Plan for Additional Problems:   · CAD  · last cath in 01/16 as outlined below pt w/prior stenting as well  · Known to Dr Dakotah Valle an [facility]  · Continue BB, continue statin, plavix, asa 81mg, continue ranexa    Chronic diastolic CHF   No evidence of acute chf   CXR negative for vascular congestion   No increasing weight gain, pt reports swelling in legs is dynamic but stable and tolerating low salt diet well   Continue torsemide 100mg BID, spironolactone 25mg daily continue zaroxlyn 5mg weekly every Friday, continue BB   Low salt diet, monitor I/O    ARLEEN on CPAP   Continue CPAP    Chronic respiratory failure w/hypoxia   Suspect 2* ARLEEN and habitus, pt never smoker   Pt on 3L NC at home   Continue NC and adjust as needed    CKD stage III   No LENNY   Avoid nephrotoxins    Leukocytosis   Suspect reactive, no signs symptoms of acute infection   Continue to monitor vital signs off of antibiotic    Hypokalemia   mild   Suspect secondary to significant diuretic regimen including metolazone and torsemide   Continue outpatient regimen, check magnesium in a m , follow up BMP in a m  Chronic pain   Continue OP pain regimen, pt follows w/Dr Ruslan Sena of [facility]    DM   Insulin dependent last hgb a1c however 6 9%   Continue OP insulin regimen, hold jardiance while IP   Continue SSI POC BS checks    HTN   Continue OP meds    HLD   Continue statin    Morbid obesity   Encourage weight loss       VTE Prophylaxis: Heparin  / sequential compression device   Code Status: Full Code  POLST: There is no POLST form on file for this patient (pre-hospital)    Anticipated Length of Stay:  Patient will be admitted on an Observation basis with an anticipated length of stay of  Less than 2 midnights  Justification for Hospital Stay: ACS rule out    Total Time for Visit, including Counseling / Coordination of Care: 45 minutes  Greater than 50% of this total time spent on direct patient counseling and coordination of care  Chief Complaint:   Chest pain x 5 days    History of Present Illness:    Deniz Dominguez is a 49 y o  male who presents with PMH of CAD, hypertension, hyperlipidemia, diabetes, morbid obesity and chronic respiratory failure coming the ED for chest pain for the last 5 days  He was symptom by his cardiology office Dr Alana Muniz after the patient remarked that he had been having frequent substernal chest pain radiating across the chest bilaterally the over the last 5 days  He reports that they come frequently often with exertion such as walking to the car or walking to the pool where he does water aerobics  He reports the water aerobics however do not worsen his chest pain   He reports that his pain is worse when lying the left lateral decubitus  He denies any other positional component  He denies any recent injury or trauma  He reports they only alleviate when he takes NTG which he reports he waits no more than 10 minutes  He reports he has taken nearly 25 tablets of NTG in the last 5 days due to his pain  No lightheadedness or dizziness  Some shortness of breath however w/these episodes  He reports no increase weight gain or worsening edema in his legs although he reports they are worse in the morning and better by the afternoon with high diuretic regimen  He otherwise is in his normal state of health  Of note he was last admitted for diastolic HF and had diuresis of 10L w/aggressive IV diuresis  His last cardiac cath was 1/15/16 --> Angiography showed at least a 90% stenosis in the mid LAD which was successfully treated with angioplasty/OMER  There is a tubular 50% stenosis further downstream followed by a 99% stenosis of the distal LAD with a small vascular territory distal to that lesion  An unsuccessful attempt was made at angioplasty of that distal LAD lesion (which may have been complicated by focal dissection  There was a 90% lesion of OM1 also treated with PCI/OEMR with good result  The proximal circumflex had 0% stenosis at site of previously placed stent  The distal RCA had a 40% stenosis at the site of previously placed stent (2012)  Review of Systems:    Review of Systems   Constitutional: Negative for appetite change, chills and fever  HENT: Negative for congestion, rhinorrhea and sore throat  Eyes: Negative for photophobia and visual disturbance  Respiratory: Positive for apnea and shortness of breath  Negative for cough  Cardiovascular: Positive for chest pain and palpitations  Negative for leg swelling  Gastrointestinal: Negative for abdominal pain, nausea and vomiting  Skin: Negative for color change  Neurological: Negative for light-headedness and headaches     All other systems reviewed and are negative  Past Medical and Surgical History:     Past Medical History:   Diagnosis Date    Acute on chronic diastolic congestive heart failure (HCC)     Altered gait     Alzheimer disease     per patients,,early onset     [facility] p[facility] )     Anxiety     Brain aneurysm     Cardiac disease     Chest pain 1/13/2016    Chronic pain disorder     Constipation     Coronary artery disease     CPAP (continuous positive airway pressure) dependence     Dependent on walker for ambulation     Depression     Diabetes mellitus (HCC)     insulin dependent    Enlarged prostate     Heart failure ([facility] )     Hiatal hernia     Hypercholesterolemia     Hypertension     MI (myocardial infarction)     Migraine     Neuropathy     Shortness of breath     Sleep apnea     Stented coronary artery     [facility]     vision loss    Urinary frequency     Wears dentures     Wears glasses        Past Surgical History:   Procedure Laterality Date    BACK SURGERY      BRAIN SURGERY      CARDIAC CATHETERIZATION      with stents    CEREBRAL ANEURYSM REPAIR      with coils    ESOPHAGOGASTRODUODENOSCOPY N/A 7/1/2016    Procedure: ESOPHAGOGASTRODUODENOSCOPY (EGD); Surgeon: Guillermina De La Paz MD;  [facility]: [facility]; Service:     HERNIA REPAIR      HIATAL HERNIA REPAIR      KNEE ARTHROSCOPY Right     KNEE ARTHROSCOPY Right     PERONEAL NERVE DECOMPRESSION Right     HI ESOPHAGOGASTRODUODENOSCOPY TRANSORAL DIAGNOSTIC N/A 2/27/2017    Procedure: ESOPHAGOGASTRODUODENOSCOPY (EGD); Surgeon: Guillermina De La Paz MD;  Location: [facility];   Service: Gastroenterology    HI INSERT SPINE INFUSN [address] [place] [address] N/A 1/19/2017    Procedure: REMOVAL / EXCHANGE INTRATHECAL PAIN PUMP;  Surgeon: Maria E Sweet MD;  [facility];  Service: Orthopedics    HI INSERT SPINE INFUSN [address] [place] [address] N/A 5/16/2016    Procedure: REPLACEMENT AND PROGRAM PUMP ;  Surgeon: Maria E Sweet MD;  [facility];  Service: Orthopedics       Meds/Allergies:    Prior to Admission medications    Medication Sig Start Date End Date Taking? Authorizing Provider   albuterol (ACCUNEB) 1 25 MG/3ML nebulizer solution Take 1 ampule by nebulization every 6 (six) hours as needed for wheezing   Yes Historical Provider, MD   albuterol (PROVENTIL HFA,VENTOLIN HFA) 90 mcg/act inhaler Inhale 2 puffs every 6 (six) hours as needed for wheezing   Yes Historical Provider, MD   aspirin 81 MG tablet Take 81 mg by mouth daily  Yes Historical Provider, MD   atorvastatin (LIPITOR) 40 mg tablet Take 40 mg by mouth daily  Yes Historical Provider, MD   baclofen 10 mg tablet Take 10 mg by mouth 3 (three) times a day   Yes Historical Provider, MD   cholecalciferol (VITAMIN D3) 1,000 units tablet Take 2,000 Units by mouth daily   Yes Historical Provider, MD   citalopram (CeleXA) 40 mg tablet Take 40 mg by mouth daily  Yes Historical Provider, MD   clopidogrel (PLAVIX) 75 mg tablet Take 75 mg by mouth daily  Yes Historical Provider, MD   dicyclomine (BENTYL) 20 mg tablet Take 10 mg by mouth every 6 (six) hours as needed   Yes Historical Provider, MD   Empagliflozin (JARDIANCE) 25 MG TABS Take 10 mg by mouth daily     Yes Historical Provider, MD   fluticasone-salmeterol (ADVAIR) 250-50 mcg/dose Inhale 1 puff 2 (two) times a day  Yes Historical Provider, MD   gabapentin (NEURONTIN) 800 mg tablet Take 800 mg by mouth 2 (two) times a day   Yes Historical Provider, MD   insulin aspart (NOVOLOG FLEXPEN) 100 Units/mL SOPN Inject 25 Units under the skin 2 (two) times a day     Yes Historical Provider, MD   Insulin Degludec (TRESIBA FLEXTOUCH) 200 UNIT/ML SOPN Inject 110 Units under the skin daily at bedtime   Yes Historical Provider, MD   losartan (COZAAR) 25 mg tablet Take 25 mg by mouth daily   Yes Historical Provider, MD   lubiprostone (AMITIZA) 24 mcg capsule Take 24 mcg by mouth 2 (two) times a day with meals       Yes Historical Provider, MD memantine (NAMENDA) 10 mg tablet Take 10 mg by mouth daily   Yes Historical Provider, MD   metolazone (ZAROXOLYN) 5 mg tablet Take 1 tablet by mouth once a week 9/10/17  Yes Chloé Munoz DO   morphine (MS CONTIN) 100 mg 12 hr tablet Take 100 mg by mouth 2 (two) times a day     Yes Historical Provider, MD   nebivolol (BYSTOLIC) 10 mg tablet Take 10 mg by mouth daily  Yes Historical Provider, MD   nitroglycerin (NITROSTAT) 0 4 mg SL tablet Place 0 4 mg under the tongue every 5 (five) minutes as needed for chest pain (pt has not  used recently)  Yes Historical Provider, MD   omeprazole (PriLOSEC) 20 mg delayed release capsule Take 20 mg by mouth 2 (two) times a day   Yes Historical Provider, MD   oxyCODONE (ROXICODONE) 30 MG immediate release tablet Take 30 mg by mouth every 6 (six) hours as needed for moderate pain     Yes Historical Provider, MD   polyethylene glycol (MIRALAX) 17 g packet Take 17 g by mouth daily  Yes Historical Provider, MD   potassium chloride (K-DUR,KLOR-CON) 10 mEq tablet Take 2 tablets by mouth 3 (three) times a day 9/10/17  Yes Chloé Munoz DO   ranolazine (RANEXA) 1000 MG SR tablet Take 1,000 mg by mouth 2 (two) times a day  Yes Historical Provider, MD   spironolactone (ALDACTONE) 25 mg tablet Take 1 tablet by mouth daily for 30 days 5/13/17 10/12/17 Yes Pamela Pickett MD   tamsulosin [facility]) 0 4 mg Take 0 4 mg by mouth daily with dinner  Yes Historical Provider, MD   TiZANidine (ZANAFLEX) 2 MG capsule Take 2 mg by mouth 3 (three) times a day  Yes Historical Provider, MD   topiramate (TOPAMAX) 100 mg tablet Take 100 mg by mouth 2 (two) times a day  Yes Historical Provider, MD   torsemide (DEMADEX) 100 mg tablet Take 1 tablet by mouth 2 (two) times a day 9/10/17  Yes Chloé Munoz DO   traZODone (DESYREL) 100 mg tablet Take 100 mg by mouth daily at bedtime       Yes Historical Provider, MD   zolpidem (AMBIEN) 10 mg tablet Take 10 mg by mouth daily at bedtime as needed for sleep   Yes Historical Provider, MD   morphine 10 mg/mL Infuse 12 mg into a venous catheter      Historical Provider, MD   baclofen 10 mg tablet Take 10 mg by mouth 3 (three) times a day as needed  10/12/17  Historical Provider, MD   beclomethasone (QVAR) 80 MCG/ACT inhaler Inhale 1 puff 2 (two) times a day  10/12/17  Historical Provider, MD   Insulin Glargine (TOUJEO SOLOSTAR SC) Inject 100 Units under the skin daily at bedtime  10/12/17  Historical Provider, MD   ipratropium-albuterol (COMBIVENT)  mcg/act inhaler Inhale 2 puffs every 6 (six) hours as needed for wheezing  10/12/17  Historical Provider, MD     I have reviewed home medications with patient personally  Allergies: No Known Allergies    Social History:     Marital Status: /Civil Union   Occupation:   Patient Pre-hospital Living Situation: Patient Pre-hospital Level of Mobility:   Patient Pre-hospital Diet Restrictions:   Substance Use History:   History   Alcohol Use No     History   Smoking Status    Never Smoker   Smokeless Tobacco    Never Used     History   Drug Use No       Family History:    Family History   Problem Relation Age of Onset    Family history unknown: Yes       Physical Exam:     Vitals:   Blood Pressure: 117/65 (10/12/17 1915)  Pulse: 56 (10/12/17 1915)  Temperature: (!) 97 3 °F (36 3 °C) (10/12/17 1915)  Temp Source: Tympanic (10/12/17 1915)  Respirations: 18 (10/12/17 1915)  Height: 6' 2" (188 cm) (10/12/17 1915)  Weight - Scale: (!) 167 kg (368 lb 2 7 oz) (10/12/17 1915)  SpO2: 95 % (10/12/17 1915)    Physical Exam   Constitutional: He appears well-developed  No distress  Pt lying nearly supine, no s/sx of distress  Morbid obesity noted   HENT:   Head: Normocephalic and atraumatic  Right Ear: External ear normal    Left Ear: External ear normal    Mouth/Throat: Oropharynx is clear and moist  No oropharyngeal exudate  Eyes: Conjunctivae are normal  Right eye exhibits no discharge   Left eye exhibits no discharge  Neck: Normal range of motion  Cardiovascular: Normal rate, regular rhythm, normal heart sounds and intact distal pulses  Exam reveals no gallop and no friction rub  No murmur heard  Pulmonary/Chest: Effort normal  No respiratory distress  He has no wheezes  He has no rales  He exhibits tenderness (mild TTP in substernal CP )  3L NC O2 noted  Abdominal: Soft  He exhibits no distension  There is no tenderness  There is no rebound and no guarding  Musculoskeletal: He exhibits no edema  Lymphadenopathy:     He has no cervical adenopathy  Neurological: He is alert  Skin: Skin is warm  He is not diaphoretic  No erythema  Psychiatric: He has a normal mood and affect  Vitals reviewed  (  Be Sure to Include Physical Exam: Delete this entire line when you have entered your exam)    Additional Data:     Lab Results: I have personally reviewed pertinent reports  Results from last 7 days  Lab Units 10/12/17  1618   WBC Thousand/uL 14 03*   HEMOGLOBIN g/dL 12 9   HEMATOCRIT % 41 2   PLATELETS Thousands/uL 253   NEUTROS PCT % 78*   LYMPHS PCT % 15   MONOS PCT % 5   EOS PCT % 2       Results from last 7 days  Lab Units 10/12/17  1618   SODIUM mmol/L 139   POTASSIUM mmol/L 3 4*   CHLORIDE mmol/L 102   CO2 mmol/L 30   BUN mg/dL 22   CREATININE mg/dL 1 50*   CALCIUM mg/dL 8 8   TOTAL PROTEIN g/dL 6 9   BILIRUBIN TOTAL mg/dL 0 46   ALK PHOS U/L 117*   ALT U/L 30   AST U/L 16   GLUCOSE RANDOM mg/dL 149*       Results from last 7 days  Lab Units 10/12/17  1618   INR  1 04       Imaging: I have personally reviewed pertinent reports  Xr Chest Portable    Result Date: 10/12/2017  Narrative: CHEST INDICATION:  Chest pain for 5 days  COMPARISON:  9/5/2017 VIEWS:   AP frontal IMAGES:  1 FINDINGS:     Cardiomediastinal silhouette appears unremarkable  The lungs are clear  No pneumothorax or pleural effusion   Visualized osseous structures appear within normal limits for the patient's age      Impression: No active pulmonary disease  Workstation performed: [id number]       EKG, Pathology, and Other Studies Reviewed on Admission:   · EKG:  Normal sinus rhythm without ST changes or T-wave inversions in 2+ contiguous leads    Allscripts / Epic Records Reviewed: Yes     ** Please Note: This note has been constructed using a voice recognition system   **

## 2017-10-13 NOTE — PLAN OF CARE
Problem: Prexisting or High Potential for Compromised Skin Integrity  Goal: Skin integrity is maintained or improved  INTERVENTIONS:  - Identify patients at risk for skin breakdown  - Assess and monitor skin integrity  - Assess and monitor nutrition and hydration status  - Monitor labs (i e  albumin)  - Assess for incontinence   - Turn and reposition patient  - Assist with mobility/ambulation  - Relieve pressure over bony prominences  - Avoid friction and shearing  - Provide appropriate hygiene as needed including keeping skin clean and dry  - Evaluate need for skin moisturizer/barrier cream  - Collaborate with interdisciplinary team (i e  Nutrition, Rehabilitation, etc )   - Patient/family teaching  Outcome: Progressing

## 2017-10-13 NOTE — SOCIAL WORK
CM explained Medicare Outpatient Observation status to patient; patient signed notice and was provided with copy  No other needs at present  CM to follow as needed

## 2017-10-13 NOTE — CASE MANAGEMENT
Initial Clinical Review    Admission: Date/Time/Statement:   10/12  1811    Orders Placed This Encounter   Procedures    Place in Observation (expected length of stay for this patient is less than two midnights)     Standing Status:   Standing     Number of Occurrences:   1     Order Specific Question:   Admitting Physician     Answer:   Lin Barahona     Order Specific Question:   Level of Care     Answer:   Med Surg [16]         ED: Date/Time/Mode of Arrival:   ED Arrival Information     Expected Arrival Acuity Means of Arrival Escorted By Service Admission Type    - 10/12/2017 16:00 Emergent Walk-In Self General Medicine Emergency    Arrival Complaint    Chest Pain           Chief Complaint:   Chief Complaint   Patient presents with    Chest Pain     Pt has intermittant Chest pain x 5 days  Just took a nitro several minutes ago  Pt states he has taken approx 11 nitro today  Pt was at Dr Elizabeth Quintero office and they did an EKG  History of Illness: Alexia Sutton is a 49 y o  male who presents with PMH of CAD, hypertension, hyperlipidemia, diabetes, morbid obesity and chronic respiratory failure coming the ED for chest pain for the last 5 days  He was symptom by his cardiology office Dr Franky Wright after the patient remarked that he had been having frequent substernal chest pain radiating across the chest bilaterally the over the last 5 days  He reports that they come frequently often with exertion such as walking to the car or walking to the pool where he does water aerobics  He reports the water aerobics however do not worsen his chest pain  He reports that his pain is worse when lying the left lateral decubitus  He denies any other positional component  He denies any recent injury or trauma  He reports they only alleviate when he takes NTG which he reports he waits no more than 10 minutes  He reports he has taken nearly 25 tablets of NTG in the last 5 days due to his pain    No lightheadedness or dizziness  Some shortness of breath however w/these episodes  He reports no increase weight gain or worsening edema in his legs although he reports they are worse in the morning and better by the afternoon with high diuretic regimen  He otherwise is in his normal state of health      Of note he was last admitted for diastolic HF and had diuresis of 10L w/aggressive IV diuresis  His last cardiac cath was 1/15/16 --> Angiography showed at least a 90% stenosis in the mid LAD which was successfully treated with angioplasty/OMER  Shantelle Gilbert is a tubular 50% stenosis further downstream followed by a 99% stenosis of the distal LAD with a small vascular territory distal to that lesion   An unsuccessful attempt was made at angioplasty of that distal LAD lesion (which may have been complicated by focal dissection  There was a 90% lesion of OM1 also treated with PCI/OMER with good result   The proximal circumflex had 0% stenosis at site of previously placed stent  The distal RCA had a 40% stenosis at the site of previously placed stent (2012)      ED Vital Signs:   ED Triage Vitals   Temperature Pulse Respirations Blood Pressure SpO2   10/12/17 1610 10/12/17 1608 10/12/17 1608 10/12/17 1608 10/12/17 1610   98 °F (36 7 °C) 64 16 124/56 (!) 89 %      Temp Source Heart Rate Source Patient Position - Orthostatic VS BP Location FiO2 (%)   10/12/17 1915 10/12/17 1709 10/12/17 1709 10/12/17 1608 --   Tympanic Monitor Sitting Right arm       Pain Score       10/12/17 1608       8        Wt Readings from Last 1 Encounters:   10/13/17 (!) 167 kg (368 lb 6 2 oz)       Vital Signs (abnormal):   10/12/17 1802  --   52  18  113/58  92 %  Nasal cannula  Sitting   10/12/17 1709  --   51  18  106/55  92 %  None (Room air)  Sitting   10/12/17 1625  --  --  --  --  --  Nasal cannula  --   10/12/17 1620  --  --  --  --  94 %  Nasal cannula  --   10/12/17 1610  98 °F (36 7 °C)  --  --  --   89 %  --  --     Abnormal Labs/Diagnostic Test Results: PTT   22, K   3 4, BUN creat   22  1 50, gluc   149, alk phos   117, alb   2 8, wbc  14 03   CXR - wnl , EKG- SB     ED Treatment:   Medication Administration from 10/12/2017 1600 to 10/12/2017 1914     None          Past Medical/Surgical History:    Active Ambulatory Problems     Diagnosis Date Noted    Hypertension     Hypercholesterolemia     Type 2 diabetes mellitus with renal complication ([facility])     Chronic diastolic congestive [facility] failure [facility]     Coronary artery disease involving native coronary artery 01/14/2016    Morbid obesity ([facility] ) 01/15/2016    CVA (cerebral vascular accident) ([facility] ) 01/15/2016    CHF (congestive heart failure) ([facility])     Stroke ([facility] )     Stented coronary artery     Obstructive sleep apnea syndrome     MI (myocardial infarction)     Depression     CPAP (continuous positive airway pressure) dependence     Brain aneurysm     Hypokalemia 10/14/2016    Alzheimer disease     Acute on chronic diastolic congestive heart failure (HCC)     Chronic pain disorder     Constipation     Coronary artery disease     Diabetes mellitus ([facility]     Migraine     Sleep apnea     Acute tracheobronchitis 05/10/2017    Dyspnea on exertion 09/05/2017    Bilateral leg edema 09/05/2017    Chronic respiratory failure [facility] ) 09/05/2017     Resolved Ambulatory Problems     Diagnosis Date Noted    Acute on chronic diastolic congestive heart failure (HCC)     Acute on chronic diastolic congestive heart failure (HCC)      Past Medical History:   Diagnosis Date    Acute on chronic diastolic congestive heart failure (HCC)     Altered gait     Alzheimer disease     Angina pectoris ([facility]     Anxiety     Brain aneurysm     Cardiac disease     Chest pain 1/13/2016    Chronic pain disorder     Constipation     Coronary artery disease     CPAP (continuous positive airway pressure) dependence     Dependent on walker for ambulation     Depression     Diabetes mellitus ([facility] )     Enlarged prostate     Heart failure ([facility] )     Hiatal hernia     Hypercholesterolemia     Hypertension     MI (myocardial infarction)     Migraine     Neuropathy     Shortness of breath     Sleep apnea     Stented coronary artery     Stroke ([facility] )     Urinary frequency     Wears dentures     Wears glasses        Admitting Diagnosis: Chest pain [R07 9]    Age/Sex: 49 y o  male    Assessment/Plan: Principal Problem:    Chest pain  Active Problems:    Hypertension    Hypercholesterolemia    Chronic diastolic congestive heart failure (HCC)    Coronary artery disease involving native coronary artery    Morbid obesity (HCC)    Obstructive sleep apnea syndrome    Hypokalemia    Chronic pain disorder    Diabetes mellitus (HCC)    Chronic respiratory failure (HCC)    Stage 3 chronic kidney disease    Leukocytosis   Plan for the Primary Problem(s):  · Chest pain  ? ACS rule out  ? EKG w/o ST changes or T wave inversions in 2+ leads, no LBBB, CXR, negative for dissection, POC trop 0  ? As pt has known disease as noted below as documented below and given increasing frequency pt may benefit from NM stress vs stress TTE  ? Will give full dose asa x1  ? Will consult cardiology for further consideration vs adjustment in OP anginal medications  ? Continue to monitor on telemetry and trend troponins   Plan for Additional Problems:   · CAD  ? last cath in 01/16 as outlined below pt w/prior stenting as well  ? Known to Dr Rene Cooney an [facility]  ?  Continue BB, continue statin, plavix, asa 81mg, continue ranexa   Chronic diastolic CHF                  No evidence of acute chf                  CXR negative for vascular congestion                  No increasing weight gain, pt reports swelling in legs is dynamic but stable and tolerating low salt diet well                  Continue torsemide 100mg BID, spironolactone 25mg daily continue zaroxlyn 5mg weekly every Friday, continue BB                  Low salt diet, monitor I/O     ARLEEN on CPAP                  Continue CPAP     Chronic respiratory failure w/hypoxia                  Suspect 2* ARLEEN and habitus, pt never smoker                  Pt on 3L NC at home                  Continue NC and adjust as needed     CKD stage III                  No LENNY                  Avoid nephrotoxins     Leukocytosis                  Suspect reactive, no signs symptoms of acute infection                  Continue to monitor vital signs off of antibiotic     Hypokalemia                  mild                  Suspect secondary to significant diuretic regimen including metolazone and torsemide                  Continue outpatient regimen, check magnesium in a m , follow up BMP in a m    Chronic pain                  Continue OP pain regimen, pt follows w/Dr Fernando [facility]  DM                Insulin dependent last hgb a1c however 6 9%                  Continue OP insulin regimen, hold jardiance while IP                  Continue SSI POC BS checks   HTN                  Continue OP meds   HLD                  Continue statin   Morbid obesity                  Encourage weight loss                   VTE Prophylaxis: Heparin  / sequential compression device   Code Status: Full Code  POLST: There is no POLST form on file for this patient (pre-hospital)   Anticipated Length of Stay:  Patient will be admitted on an Observation basis with an anticipated length of stay of  Less than 2 midnights     Justification for Hospital Stay: ACS rule out       Admission Orders:  Scheduled Meds:   aspirin 81 mg Oral Daily   atorvastatin 40 mg Oral Daily With Dinner   baclofen 10 mg Oral TID   cholecalciferol 2,000 Units Oral Daily   citalopram 40 mg Oral Daily   clopidogrel 75 mg Oral Daily   fluticasone-salmeterol 1 puff Inhalation BID   gabapentin 800 mg Oral BID   heparin (porcine) 5,000 Units Subcutaneous Q8H [address]   insulin glargine 100 Units Subcutaneous HS   insulin lispro 2-12 Units Subcutaneous TID AC insulin lispro 2-12 Units Subcutaneous HS   insulin lispro 20 Units Subcutaneous TID With Meals   losartan 25 mg Oral Daily   lubiprostone 24 mcg Oral BID With Meals   memantine 10 mg Oral Daily   metolazone 5 mg Oral Weekly   morphine 105 mg Oral BID   nebivolol 10 mg Oral Daily   pantoprazole 20 mg Oral Early Morning   polyethylene glycol 17 g Oral Daily   potassium chloride 20 mEq Oral TID   ranolazine 1,000 mg Oral BID   spironolactone 25 mg Oral Daily   tamsulosin 0 4 mg Oral Daily With Dinner   tiZANidine 2 mg Oral TID   topiramate 100 mg Oral BID   torsemide 100 mg Oral BID   traZODone 100 mg Oral HS     Continuous Infusions:    PRN Meds:   acetaminophen    albuterol    albuterol    dicyclomine    nitroglycerin    ondansetron    oxyCODONE    pneumococcal 13-valent conjugate vaccine    zolpidem    Fingerstick ac and hs   Cardiac diet   Up and OOB as nora   Up w/ assist   EKG prn cp   Daily weight   I&O   cpap   Cardiology consult   SCD  Tele   Serial trop   10/13  Mg , bmp   K   3 4, BUN creat   20  1 31, mg   3 0

## 2017-10-13 NOTE — PROGRESS NOTES
Progress Note - Cardiology   Maddy Barrera 49 y o  male MRN: [id number]  Unit/Bed#: E2 -01 Encounter: [id number]      Assessment:     1  Atypical CP  2  Ventricular bigeminy  3  CAD s/p BMS RCA 2012, PCI/OMER mid LAD 1/2016, unable to correct distal LAD dz   4  Chronic diastolic CHF    Discussion/Recommendations:    · Chest pain in hospital this AM correlated with ventricular bigeminy  Negative biomarkers with no ischemic ECG changes  · Pt to f/u w/ Dr Vikas Delarosa later this month--may consider OP holter to try correlating future episodes of "pain" with ventricular ectopy--if frequent PVCs/bigeminy, may consider suppression if correlate with symptoms  · Beta blocker is optimized  Con't antianginals and other meds  · Will offer isosorbide mononitrate and asked pt to reduce SL NTG intake (>10 in a day over past 5 days)  · OK to d/c later today from cardiac standpoint if feeling well      Subjective: Seen and examined, feels well, no further chest pain  Had chest pain this morning which correlated with ventricular bigeminy on ECG        Physical Exam:  GEN:  NAD  HEENT:  MMM, NCAT, pink conjunctiva, EOMI, nonicteric sclera  CV:  NO JVD/HJR, RR, NO M/R/G, +S1/S2, NO PARASTERNAL HEAVE/THRILL, NO LE EDEMA, NO HEPATIC SYSTOLIC PULSATION, WARM EXTREMITIES  RESP:  CTAB/L  ABD:  SOFT, NT, NO GROSS ORGANOMEGALY        Vitals:   /62   Pulse 61   Temp 98 °F (36 7 °C) (Tympanic)   Resp 16   Ht 6' 2" (1 88 m)   Wt (!) 167 kg (368 lb 6 2 oz)   SpO2 95%   BMI 47 30 kg/m²   Vitals:    10/12/17 1915 10/13/17 0600   Weight: (!) 167 kg (368 lb 2 7 oz) (!) 167 kg (368 lb 6 2 oz)       Intake/Output Summary (Last 24 hours) at 10/13/17 1137  Last data filed at 10/13/17 0233   Gross per 24 hour   Intake                0 ml   Output              800 ml   Net             -800 ml         TELEMETRY: sR, Ventricular bigeminy  Lab Results:    Results from last 7 days  Lab Units 10/12/17  1618   WBC Thousand/uL 14 03* HEMOGLOBIN g/dL 12 9   HEMATOCRIT % 41 2   PLATELETS Thousands/uL 253       Results from last 7 days  Lab Units 10/13/17  0434 10/12/17  1618   SODIUM mmol/L 140 139   POTASSIUM mmol/L 3 4* 3 4*   CHLORIDE mmol/L 106 102   CO2 mmol/L 27 30   BUN mg/dL 20 22   CREATININE mg/dL 1 31* 1 50*   CALCIUM mg/dL 8 7 8 8   TOTAL PROTEIN g/dL  --  6 9   BILIRUBIN TOTAL mg/dL  --  0 46   ALK PHOS U/L  --  117*   ALT U/L  --  30   AST U/L  --  16   GLUCOSE RANDOM mg/dL 107 149*       Results from last 7 days  Lab Units 10/13/17  0434   SODIUM mmol/L 140   POTASSIUM mmol/L 3 4*   CHLORIDE mmol/L 106   CO2 mmol/L 27   BUN mg/dL 20   CREATININE mg/dL 1 31*   GLUCOSE RANDOM mg/dL 107   CALCIUM mg/dL 8 7             Medications:    Current Facility-Administered Medications:     acetaminophen (TYLENOL) tablet 650 mg, 650 mg, Oral, Q4H PRN, Jonelle Hurd PA-C    albuterol (PROVENTIL HFA,VENTOLIN HFA) inhaler 2 puff, 2 puff, Inhalation, Q6H PRN, Jonelle Hurd PA-C    albuterol inhalation solution 1 25 mg, 1 25 mg, Nebulization, Q6H PRN, Jonelle Hurd PA-C    aspirin chewable tablet 81 mg, 81 mg, Oral, Daily, Jonelle Hurd PA-C, 81 mg at 10/13/17 0857    atorvastatin (LIPITOR) tablet 40 mg, 40 mg, Oral, Daily With Dinner, Jonelle Hurd PA-C    baclofen tablet 10 mg, 10 mg, Oral, TID, Jonelle Hurd PA-C, 10 mg at 10/13/17 0857    cholecalciferol (VITAMIN D3) tablet 2,000 Units, 2,000 Units, Oral, Daily, Jonelle Hurd PA-C, 2,000 Units at 10/13/17 0858    citalopram (CeleXA) tablet 40 mg, 40 mg, Oral, Daily, Jonelle Hurd PA-C, 40 mg at 10/13/17 0858    clopidogrel (PLAVIX) tablet 75 mg, 75 mg, Oral, Daily, Jonelle Hurd PA-C, 75 mg at 10/13/17 0858    dicyclomine (BENTYL) tablet 10 mg, 10 mg, Oral, Q6H PRN, Jonelle Hurd PA-C    fluticasone-salmeterol (ADVAIR) 250-50 mcg/dose inhaler 1 puff, 1 puff, Inhalation, BID, Jonelle Hurd PA-C, 1 puff at 10/13/17 0906   gabapentin (NEURONTIN) capsule 800 mg, 800 mg, Oral, BID, Jonelle Hurd PA-C, 800 mg at 10/13/17 0856    heparin (porcine) subcutaneous injection 5,000 Units, 5,000 Units, Subcutaneous, Q8H [address], 5,000 Units at 10/13/17 0545 **AND** [CANCELED] Platelet count, , , Once, Jonelle Hurd PA-C    insulin glargine (LANTUS) subcutaneous injection 100 Units, 100 Units, Subcutaneous, HS, ANTHONY Donald-C, 100 Units at 10/12/17 2227    insulin lispro (HumaLOG) 100 units/mL subcutaneous injection 2-12 Units, 2-12 Units, Subcutaneous, TID AC **AND** Fingerstick Glucose (POCT), , , 4x Daily AC and at bedtime, Jonelle Hurd PA-C    insulin lispro (HumaLOG) 100 units/mL subcutaneous injection 2-12 Units, 2-12 Units, Subcutaneous, HS, Jonelle Hurd PA-C    insulin lispro (HumaLOG) 100 units/mL subcutaneous injection 20 Units, 20 Units, Subcutaneous, TID With Meals, Doug Berry PA-C, 20 Units at 10/13/17 0901    losartan (COZAAR) tablet 25 mg, 25 mg, Oral, Daily, SUDHEER DonaldC, 25 mg at 10/13/17 0858    lubiprostone (AMITIZA) capsule 24 mcg, 24 mcg, Oral, BID With Meals, SUDHEER DonaldC, 24 mcg at 10/13/17 0901    memantine (NAMENDA) tablet 10 mg, 10 mg, Oral, Daily, ANTHONY Donald-C, 10 mg at 10/13/17 0856    metolazone (ZAROXOLYN) tablet 5 mg, 5 mg, Oral, Weekly, ANTHONY Donald-C, 5 mg at 10/13/17 0900    morphine (MS CONTIN) ER tablet 105 mg, 105 mg, Oral, BID, ANTHONY Donald-C, 105 mg at 10/13/17 0857    nebivolol (BYSTOLIC) tablet 10 mg, 10 mg, Oral, Daily, Jonelle Hurd PA-C, 10 mg at 10/13/17 0858    nitroglycerin (NITROSTAT) SL tablet 0 4 mg, 0 4 mg, Sublingual, Q5 Min PRN, Jonelle Hurd PA-C, 0 4 mg at 10/13/17 0944    ondansetron (ZOFRAN) injection 4 mg, 4 mg, Intravenous, Q6H PRN, Jonelle Hurd PA-C    oxyCODONE (ROXICODONE) immediate release tablet 30 mg, 30 mg, Oral, Q6H PRN, Jonelle Hurd PA-C, 30 mg at 10/13/17 0550    pantoprazole (PROTONIX) EC tablet 20 mg, 20 mg, Oral, Early Morning, ANTHONY Donald-THIAGO, 20 mg at 10/13/17 0545    pneumococcal 13-valent conjugate vaccine (PREVNAR-13) IM injection 0 5 mL, 0 5 mL, Intramuscular, Prior to discharge, Mona Szymanski DO    polyethylene glycol (MIRALAX) packet 17 g, 17 g, Oral, Daily, ANTHONY Donald-C, 17 g at 10/13/17 0910    potassium chloride (K-DUR,KLOR-CON) CR tablet 20 mEq, 20 mEq, Oral, TID, ANTHONY Donald-C, 20 mEq at 10/13/17 0857    ranolazine (RANEXA) 12 hr tablet 1,000 mg, 1,000 mg, Oral, BID, ANTHONY Donald-C, 1,000 mg at 10/13/17 0857    spironolactone (ALDACTONE) tablet 25 mg, 25 mg, Oral, Daily, ANTHONY Donald-C, 25 mg at 10/13/17 0857    tamsulosin (FLOMAX) capsule 0 4 mg, 0 4 mg, Oral, Daily With Fany Hahn PA-C    tiZANidine (ZANAFLEX) tablet 2 mg, 2 mg, Oral, TID, ANTHONY Donald-C, 2 mg at 10/13/17 0857    topiramate (TOPAMAX) tablet 100 mg, 100 mg, Oral, BID, ANTHONY Donald-C, 100 mg at 10/13/17 0857    torsemide (DEMADEX) tablet 100 mg, 100 mg, Oral, BID, Jonelle Hurd PA-C, 100 mg at 10/13/17 0900    traZODone (DESYREL) tablet 100 mg, 100 mg, Oral, HS, ANTHONY Donald-C, 100 mg at 10/12/17 2218    zolpidem (AMBIEN) tablet 10 mg, 10 mg, Oral, HS PRN, ANTHONY Donald-C    Portions of the record may have been created with voice recognition software  Occasional wrong word or "sound a like" substitutions may have occurred due to the inherent limitations of voice recognition software  Read the chart carefully and recognize, using context, where substitutions have occurred

## 2017-10-13 NOTE — PROGRESS NOTES
Pt reporting severe substernal CP and numbness of LUE  Pt appears to be diaphoretic  Stat EKG obtain  Pt in NSR with bigemeny  Nitro sublingual given  EKG obtained after nitro/pain resolved  Back in NSR  Dr Alana Sheppard made aware and cardiology paged  Will continue to monitor

## 2017-10-13 NOTE — PROGRESS NOTES
Progress Note - Pk Betancourt 49 y o  male MRN: [id number]    Unit/Bed#: E2 -01 Encounter: [id number]    Assessment/Plan:  Chest pain    Seen by Dr Car Prajapati yesterday and referred for admission      Troponins less than 0 2      Await Cardiology consult for further guidance    Morbid obesity   working on weight loss with exercise    CKD 3    creatinine stable at 1 31    Dyslipidemia   continue statin for LDL control    Diabetes   control with Lantus and Humalog      Continue ADA and sliding scale    Diastolic heart failure  chronic and compensated with current meds    Hypertension   well controlled with current meds    Subjective:   Had 1 episode of chest pain overnight central chest and sharp related denies shortness of breath nausea vomiting diarrhea no fevers chills appetite is fine    Objective:     Vitals: Blood pressure 108/62, pulse 61, temperature 98 °F (36 7 °C), temperature source Tympanic, resp  rate 16, height 6' 2" (1 88 m), weight (!) 167 kg (368 lb 6 2 oz), SpO2 95 %  ,Body mass index is 47 3 kg/m²          Results from last 7 days  Lab Units 10/12/17  1618   WBC Thousand/uL 14 03*   HEMOGLOBIN g/dL 12 9   HEMATOCRIT % 41 2   PLATELETS Thousands/uL 253   INR  1 04       Results from last 7 days  Lab Units 10/13/17  0434 10/12/17  1618   SODIUM mmol/L 140 139   POTASSIUM mmol/L 3 4* 3 4*   CHLORIDE mmol/L 106 102   CO2 mmol/L 27 30   BUN mg/dL 20 22   CREATININE mg/dL 1 31* 1 50*   CALCIUM mg/dL 8 7 8 8   TOTAL PROTEIN g/dL  --  6 9   BILIRUBIN TOTAL mg/dL  --  0 46   ALK PHOS U/L  --  117*   ALT U/L  --  30   AST U/L  --  16   GLUCOSE RANDOM mg/dL 107 149*       Scheduled Meds:    aspirin 81 mg Oral Daily   atorvastatin 40 mg Oral Daily With Dinner   baclofen 10 mg Oral TID   cholecalciferol 2,000 Units Oral Daily   citalopram 40 mg Oral Daily   clopidogrel 75 mg Oral Daily   fluticasone-salmeterol 1 puff Inhalation BID   gabapentin 800 mg Oral BID   heparin (porcine) 5,000 Units Subcutaneous Q8H [address]   insulin glargine 100 Units Subcutaneous HS   insulin lispro 2-12 Units Subcutaneous TID AC   insulin lispro 2-12 Units Subcutaneous HS   insulin lispro 20 Units Subcutaneous TID With Meals   losartan 25 mg Oral Daily   lubiprostone 24 mcg Oral BID With Meals   memantine 10 mg Oral Daily   metolazone 5 mg Oral Weekly   morphine 105 mg Oral BID   nebivolol 10 mg Oral Daily   pantoprazole 20 mg Oral Early Morning   polyethylene glycol 17 g Oral Daily   potassium chloride 20 mEq Oral TID   ranolazine 1,000 mg Oral BID   spironolactone 25 mg Oral Daily   tamsulosin 0 4 mg Oral Daily With Dinner   tiZANidine 2 mg Oral TID   topiramate 100 mg Oral BID   torsemide 100 mg Oral BID   traZODone 100 mg Oral HS       Continuous Infusions:      Physical exam:  General appearance:  Alert orient x3 no distress interaction appropriate   Head/Eyes:  Nonicteric PERRL EOMI  Neck:  Supple   Lungs:  Decreased BS bilateral no wheezing rhonchi or rales  Heart: normal S1 S2 regular distant heart sounds  Abdomen:  Obese nontender with bowel sounds  Extremities:  Bilateral edema  Skin: no rash    Invasive Devices     Peripheral Intravenous Line            Peripheral IV 10/12/17 Left Forearm 1 day                  VTE Pharmacologic Prophylaxis:  heparin   VTE Mechanical Prophylaxis:  SCDs                     Counseling / Coordination of Care  Total floor / unit time spent today  30  minutes  Greater than 50% of total time was spent with the patient and / or family counseling and / or coordination of care    A description of the counseling / coordination of care:

## 2017-10-13 NOTE — PROGRESS NOTES
Pt resting comfortable in bed at this time  Denies any CP or SOB  Pt states he is feeling much better  Will continue to monitor

## 2017-10-16 LAB
ATRIAL RATE: 55 BPM
ATRIAL RATE: 70 BPM
ATRIAL RATE: 70 BPM
P AXIS: 25 DEGREES
P AXIS: 25 DEGREES
P AXIS: 39 DEGREES
PR INTERVAL: 194 MS
PR INTERVAL: 196 MS
PR INTERVAL: 224 MS
QRS AXIS: -4 DEGREES
QRS AXIS: 10 DEGREES
QRS AXIS: 4 DEGREES
QRSD INTERVAL: 88 MS
QRSD INTERVAL: 88 MS
QRSD INTERVAL: 90 MS
QT INTERVAL: 466 MS
QT INTERVAL: 476 MS
QT INTERVAL: 494 MS
QTC INTERVAL: 472 MS
QTC INTERVAL: 503 MS
QTC INTERVAL: 514 MS
T WAVE AXIS: 22 DEGREES
T WAVE AXIS: 30 DEGREES
T WAVE AXIS: 32 DEGREES
VENTRICULAR RATE: 55 BPM
VENTRICULAR RATE: 70 BPM
VENTRICULAR RATE: 70 BPM

## 2017-10-18 ENCOUNTER — ALLSCRIPTS OFFICE VISIT (OUTPATIENT)
Dept: OTHER | Facility: OTHER | Age: 57
End: 2017-10-18

## 2017-10-20 ENCOUNTER — HOSPITAL ENCOUNTER (OUTPATIENT)
Dept: NON INVASIVE DIAGNOSTICS | Facility: HOSPITAL | Age: 57
Discharge: HOME | End: 2017-10-20
Attending: INTERNAL MEDICINE
Payer: MEDICARE

## 2017-10-20 DIAGNOSIS — I49.9 CARDIAC ARRHYTHMIA: ICD-10-CM

## 2017-10-20 PROCEDURE — 93225 XTRNL ECG REC<48 HRS REC: CPT

## 2017-10-20 PROCEDURE — 93226 XTRNL ECG REC<48 HR SCAN A/R: CPT

## 2017-10-26 ENCOUNTER — ALLSCRIPTS OFFICE VISIT (OUTPATIENT)
Dept: OTHER | Facility: OTHER | Age: 57
End: 2017-10-26

## 2017-10-26 NOTE — PROGRESS NOTES
Assessment  Assessed    1  Arteriosclerosis of coronary artery (414 00) (I25 10)   2  Chronic diastolic CHF (congestive heart failure) (428 32,428 0) (I50 32)   3  Hypercholesterolemia (272 0) (E78 00)   4  Benign essential hypertension (401 1) (I10)    Plan  Chronic diastolic CHF (congestive heart failure)    · From  Torsemide 100 MG Oral Tablet One po BID To Torsemide 100 MG Oral  Tablet 2 5 tablets daily in divided dosages   Rx By: Danae Wheat; Dispense: 30 Days ; #:75 Tablet; Refill: 5;For: Chronic diastolic CHF (congestive heart failure); JOSÉ MIGUEL = N; Sent To: [address]    Discussion/Summary  Cardiology Discussion Summary Free Text Note Form [facility]: It is my impression the patient appears to be relatively compensated with diastolic CHF after an acute exacerbation  He is on rather robust dosage of torsemide 100 mg BID  [facility] He is now on metolazone 5 mg weekly  He will continue spironolactone daily  He is not having angina despite severe CAD  His pain does correlate with CHF  We will continue DAPT (also for CVA) along with beta blockers and Ranexa  He no longer takes amlodipine and his BP is fine without it  He will remain on atorvastatin for hyperlipidemia  I have increased his torsemide to 250 mg dailly in divided dosages  He did have blood work yesterday which I will follow up on  I will see him again in 6 weeks time  Chief Complaint  Chief Complaint Free Text Note Form: here ahead of time due acute diastolic CHF requiring hospitalization last week    just discharged 2 days ago  diuresed 20 lbs  has   CAD s/p MI May 2012 with BMS to RCA  [facility] the patient also has HTN, HPL   he had stenting of the proximal circumflex with residual disease in the distal circumflex and mid LAD (Manjeet/15)   had   cath and stent of prox LAD and distal Cx January/2016 with    also has HLP, edema   Chief Complaint Chronic Condition [facility]: Patient is here today for follow up of chronic conditions described in HPI        History of Present Illness  Cardiology HPI Free Text Note Form [facility]: the patient was just in the hospital last week and discharged 2 days ago  He was diuresed 20 lbs  His wt in the lower 360 range  His edema is better  His breathing is short but is better  He denies chest pain  He denies palpitations or dizziness  Review of Systems  Cardiology Male ROS:     Cardiac: has swelling in the LEs-- and-- witnessed apnea episodes, but-- no chest pain,-- no rhythm problems,-- no fainting/blackouts,-- no heart murmur present,-- no palpitations present,-- no syncope/fainting-- and-- no AM fatigue  Skin: No complaints of nonhealing sores or skin rash  Genitourinary: frequent urination at night-- (2X PER NIGHT)-- and-- prostate problems, but-- no recurrent urinary tract infections,-- no difficult urination,-- no blood in urine,-- no kidney stones,-- no loss of bladder control,-- no kidney problems-- and-- no erectile dysfunction   Psychological: depression,-- anxiety,-- panic attacks-- and-- difficulty concentrating, but-- no palpitations present  General: trouble sleeping,-- changes in weight lbs-- and-- lack of energy/fatigue, but-- no appetite changes,-- no fever,-- no night sweats-- and-- no frequent infections  Respiratory: shortness of breath, but-- no cough/sputum,-- no wheezing,-- no phlegm-- and-- no hemoptysis  HEENT: No complaints of serious problems, hearing problems, nose problems, throat problems, or snoring  Gastrointestinal: constipation, but-- no liver problems,-- no nausea,-- no vomiting,-- no heartburn,-- no bloody stools,-- no diarrhea,-- no abdonimal pain-- and-- no rectal bleeding   Hematologic: No complaints of bleeding disorders, anemia, blood clots, or excessive brusing     Neurological: numbnes-- (FEET),-- tingling-- (FEET)-- and-- headaches, but-- no weakness,-- no seizures,-- no dizziness,-- no diplopia-- and-- no daytime sleepiness   Musculoskeletal: arthritis,-- back pain-- and-- swelling/pain   ROS Reviewed:   ROS reviewed  Active Problems  Problems    1  Abdominal pain (789 00) (R10 9)   2  Angina pectoris (413 9) (I20 9)   3  Arteriosclerosis of coronary artery (414 00) (I25 10)   4  Ascites (789 59) (R18 8)   5  Atherosclerosis (440 9) (I70 90)   6  Atherosclerosis of arteries of extremities (440 20) (I70 209)   7  Benign essential hypertension (401 1) (I10)   8  Benign prostatic hyperplasia (BPH) with post-void dribbling (600 01,788 35)   (N40 1,N39 43)   9  Brain aneurysm (437 3) (I67 1)   10  Breast pain (611 71) (N64 4)   11  Bronchitis (490) (J40)   12  Chest pain (786 50) (R07 9)   13  Chronic constipation (564 00) (K59 09)   14  Chronic diastolic CHF (congestive heart failure) (428 32,428 0) (I50 32)   15  Chronic kidney disease, stage 3 (585 3) (N18 3)   16  Chronic migraine without aura (346 70) (G43 709)   17  CVA (cerebral vascular accident) (434 91) (I63 9)   18  Depression (311) (F32 9)   19  Diabetes mellitus type 2, uncontrolled (250 02) (E11 65)   20  Diabetes mellitus, type 2 (250 00) (E11 9)   21  Dysphagia (787 20) (R13 10)   22  Edema (782 3) (R60 9)   23  Epigastric pain (789 06) (R10 13)   24  GERD without esophagitis (530 81) (K21 9)   25  Hiatal hernia (553 3) (K44 9)   26  History of aneurysm (V12 59) (Z86 79)   27  History of CVA (cerebrovascular accident) (V12 54) (Z86 73)   28  Hypercholesterolemia (272 0) (E78 00)   29  Hypokalemia (276 8) (E87 6)   30  Hypothyroidism (244 9) (E03 9)   31  Insomnia (780 52) (G47 00)   32  Insulin long-term use (V58 67) (Z79 4)   33  Joint pain, knee (719 46) (M25 569)   34  Lumbago (724 2) (M54 5)   35  Medication overuse headache (339 3) (G44 40)   36  Memory disturbance (780 93) (R41 3)   37  Morbid or severe obesity due to excess calories (278 01) (E66 01)   38  Need for immunization against influenza (V04 81) (Z23)   39  Obstructive sleep apnea (327 23) (G47 33)   40  Other chronic pain (338 29) (G89 29)   41  Peripheral neuropathy (356 9) (G62 9)   42  Preop examination (V72 84) (Z01 818)   43  Preoperative cardiovascular examination (V72 81) (Z01 810)   44  Shortness of breath (786 05) (R06 02)   45  Stabbing headache (339 85) (G44 85)   46  Status post cardiac catheterization (V45 89) (Z98 890)   47  Tear of medial meniscus of right knee (836 0) (S83 241A)   48  Venous Intermittent Claudication (453 89)   49  Vitamin D deficiency (268 9) (E55 9)   50  Wheezing on auscultation (786 07) (R06 2)    Past Medical History  Problems    1  History of Coronary Artery Disease (V12 59)   2  History of Femoral Nerve Palsy On The Right (355 2)   3  History of acute bronchitis (V12 69) (Z87 09)   4  History of acute sinusitis (V12 69) (Z87 09)   5  History of Need for prophylactic vaccination and inoculation against influenza (V04 81)   (Z23)   6  Old myocardial infarction (412) (I25 2)   7  History of Stroke Syndrome (436)  Active Problems And Past Medical History Reviewed: The active problems and past medical history were reviewed and updated today  Surgical History  Problems    1  History of Brain Surgery   2  History of Neurol Drug Infusion Implantation Of Programmable Pump   3  History of Stent Indications: Restenosis At Previous PTCA Location  Surgical History Reviewed: The surgical history was reviewed and updated today  Family History  Mother    1  Family history of Diabetes Mellitus (V18 0)   2  Denied: Family history of colitis   3  Denied: Family history of colonic polyps   4  Denied: Family history of Crohn's disease   5  Denied: Family history of liver disease   6  Denied: Family history of Malignant neoplasm of colon, unspecified part of colon  Father    7  Family history of Coronary Artery Disease (V17 49)   8  Denied: Family history of colitis   9  Denied: Family history of colonic polyps   10  Denied: Family history of Crohn's disease   6  Denied: Family history of liver disease   12   Denied: Family history of Malignant neoplasm of colon, unspecified part of colon  Brother    13  Family history of diabetes mellitus (V18 0) (Z83 3)  Family History    14  Family history of arthritis (V17 7) (Z82 61)   15  Family history of malignant neoplasm (V16 9) (Z80 9)   16  Family history of Father  At Age 39  Family History Reviewed: The family history was reviewed and updated today  Social History  Problems    · Being A Social Drinker   · Daily caffeine consumption, 2-3 servings a day   · Denied: History of Drug use   · Never A Smoker  Social History Reviewed: The social history was reviewed and updated today  The social history was reviewed and is unchanged  Current Meds   1  Advair Diskus 250-50 MCG/DOSE Inhalation Aerosol Powder Breath Activated; Therapy: 73ORZ4122 to Recorded   2  Aldactone 25 MG Oral Tablet; Take 1 tablet daily; Therapy: 42Hkj1746 to Recorded   3  Amitiza 24 MCG Oral Capsule; TAKE ONE CAPSULE BY MOUTH TWICE A DAY; Therapy: 72ZBR4845 to (Evaluate:43Zcp5829)  Requested for: 29DRC6525; Last   Rx:2014 Ordered   4  Aspirin 81 MG TABS; TAKE 1 TABLET DAILY; Therapy: 15RWY3159 to (Evaluate:05Vjb1501) Recorded   5  Atorvastatin Calcium 40 MG Oral Tablet; TAKE 1 TABLET AT BEDTIME; Therapy: 12OJD9072 to (61 23 68)  Requested for: 85CCU6360; Last   Rx:12Cgj3260 Ordered   6  Baclofen 10 MG Oral Tablet; TAKE 1 TABLET 3 times daily; Therapy: 17QDP3657 to (Evaluate:2015)  Requested for: 07Gkc8542; Last   Rx:75Zbd8572 Ordered   7  Bystolic 10 MG Oral Tablet; take 1 tablet by mouth daily; Therapy: 80GMB3601 to (Evaluate:37Qsf6323)  Requested for: 63YAY5005; Last   Rx:2017 Ordered   8  Citalopram Hydrobromide 40 MG Oral Tablet; TAKE 1 TABLET BY MOUTH EVERY DAY; Therapy: 03KFF5487 to (Teri Anthony)  Requested for: 88OZE8834; Last   Rx:2014 Ordered   9  Clopidogrel Bisulfate 75 MG Oral Tablet; take 1 tablet by mouth every day;    Therapy: 52LYD2502 to (Evaluate:12Qgb2260)  Requested for: 75PUA0222; Last   Rx:02Gxn1987 Ordered   10  Dicyclomine HCl - 10 MG Oral Capsule; TAKE 1 CAPSULE EVERY 6 HOURS AS    NEEDED; Therapy: 11EMD3342 to (Evaluate:01Tkd1185)  Requested for: 21QMV9397; Last    ZN:55EWT8467 Ordered   11  Flomax 0 4 MG Oral Capsule; TAKE 1 CAPSULE Daily; Therapy: 89REA1740 to Recorded   12  Gabapentin 800 MG Oral Tablet; Take 2 tablets daily as directed; Therapy: 83Mxk8794 to Recorded   13  Jardiance 10 MG Oral Tablet; 1 Tab daily; Therapy: 39KMQ1602 to (Evaluate:32Ocn5319)  Requested for: 37CBC5246; Last    ZZ:84XKC8106 Ordered   14  Memantine HCl - 10 MG Oral Tablet; one tab in am for 10 days then twice a day afterthat; Therapy: 41ILK4604 to (Last Rx:06Jun2017)  Requested for: 06Jun2017 Ordered   15  MetOLazone 5 MG Oral Tablet; 1 tablet weekly; Therapy: 03Auz6987 to Recorded   16  MS Contin 100 MG Oral Tablet Extended Release; 1 TABLET TWICE DAILY; Therapy: 84SOD3544 to Recorded   17  Nitrostat 0 4 MG Sublingual Tablet Sublingual; TAKE 1 TABLET SUBLINGUALLY AS    NEEDED; Therapy: 29KNY4987 to (Evaluate:86Fcw9402)  Requested for: 77WFH6549; Last    Rx:76Wsg2368 Ordered   18  NovoLOG FlexPen 100 UNIT/ML Subcutaneous Solution Pen-injector; 25 units with    breakfast and lunch and 30 units before dinner  Requested for: 27Jun2017;    Last Rx:27Jun2017 Ordered   19  Omeprazole 20 MG Oral Tablet Delayed Release; 1 PO BID; Therapy: 00ZSN8982 to (Last Rx:72Jwp9944)  Requested for: 51Mfb1667 Ordered   20  OxyCODONE HCl - 30 MG Oral Tablet; Therapy: 62XXZ5480 to (Last Rx:02Apr2011)  Requested for: 02Apr2011 Ordered   21  Potassium Chloride ER 20 MEQ Oral Tablet Extended Release; 1 tablet 3 times daily; Therapy: 36Suv8096 to Recorded   22  Qvar 80 MCG/ACT Inhalation Aerosol Solution; INHALE 1 PUFF TWICE DAILY; Therapy: 56QZJ5651 to (Last Rx:26Mar2016)  Requested for: 80DIH5951 Ordered   23   Ranexa 1000 MG Oral Tablet Extended Release 12 Hour; take 1 tablet every 12 hours; Therapy: 96EJN9653 to (Evaluate:52Vfr2495)  Requested for: 07HLR7371; Last    Rx:15Mar2017 Ordered   24  Topiramate 100 MG Oral Tablet; TAKE 1 TABLET IN AM and Two at bedtime; Therapy: 21HHI6209 to (Evaluate:01Jun2018)  Requested for: 10WEZ5469; Last    UW:21CVJ9172 Ordered   25  Torsemide 100 MG Oral Tablet; One po BID; Therapy: 35Zcv9555 to (Evaluate:65Lml2638) Recorded   26  TraZODone HCl - 100 MG Oral Tablet; TAKE 1-3 TABLETS AT BEDTIME AS NEEDED FOR    DIFFICULTY SLEEPING; Therapy: 15LPS1266 to (Evaluate:84Ogu3063)  Requested for: 74GAW3189; Last    Rx:26Nov2014 Ordered   27  [place] FlexTouch 200 UNIT/ML Subcutaneous Solution Pen-injector; 110 units daily at    bedtime; Therapy: 23YUG3064 to (Evaluate:22Hzh4189)  Requested for: 80JWX4053; Last    OJ:71CQE1603 Ordered   28  Vitamin D (Ergocalciferol) 77099 UNIT Oral Capsule; TAKE 1 CAPSULE WEEKLY FOR    12 WEEKS; Therapy: 82LWO2672 to (Last Rx:16Jun2017)  Requested for: 02SWG1545 Ordered   29  Vitamin D3 5000 UNIT Oral Capsule; 1 Cap daily; Therapy: 78AMF2123 to (Evaluate:11Jun2018)  Requested for: 23OMF3664; Last    Rx:16Jun2017 Ordered   30  Zolpidem Tartrate 10 MG Oral Tablet; TAKE 1 TABLET AT BEDTIME AS NEEDED FOR    SLEEP; Therapy: 03BMK1964 to (Evaluate:19Nov2014)  Requested for: 98EPM8532; Last    UQ:81PRG5460; Status: ACTIVE - Renewal Voided Ordered  Medication List Reviewed: The medication list was reviewed and updated today  Allergies  Medication    1  No Known Drug Allergies  Non-Medication    2  No Known Environmental Allergies   3   No Known Food Allergies    Vitals  Vital Signs    Recorded: 12Sep2017 03:19PM   Heart Rate 68   Pulse Quality Normal, L Radial   Respiration Quality Normal   Respiration 18   Systolic 422   Diastolic 50   Height 6 ft 2 in   Weight 370 lb 4 oz   BMI Calculated 47 54   BSA Calculated 2 82     Physical Exam    Constitutional   General appearance: Abnormal  -- obese middle aged white male  Eyes   Conjunctiva and Sclera examination: Conjunctiva pink, sclera anicteric  Ears, Nose, Mouth, and Throat - Oropharynx: Clear, nares are clear, mucous membranes are moist    Neck   Neck and thyroid: Normal, supple, trachea midline, no thyromegaly  Pulmonary   Auscultation of lungs: Abnormal  -- decreased breath sounds w/o wheezing rhonchi or rales  Cardiovascular   Auscultation of heart: Normal rate and rhythm, normal S1 and S2, no murmurs  Carotid pulses: Normal, 2+ bilaterally  Pedal pulses: Normal, 2+ bilaterally  Examination of extremities for edema and/or varicosities: Abnormal  -- 1+ edema of the LEs  Chest -   Sternum: Normal     Abdomen   Abdomen: Non-tender and no distention  Liver and spleen: No hepatomegaly or splenomegaly  Musculoskeletal Gait and station: Abnormal -- walker  -- Digits and nails: Normal without clubbing or cyanosis  -- Inspection/palpation of joints, bones, and muscles: Normal, ROM normal     Skin - Skin and subcutaneous tissue: Normal without rashes or lesions  Skin is warm and well perfused, normal turgor  Neurologic - Cranial nerves: II - XII intact  -- Sensation: No sensory loss  Psychiatric - Orientation to person, place, and time: Normal -- Mood and affect: Normal       Future Appointments    Date/Time Provider Specialty Site   10/11/2017 01:40 PM SALVADOR Sapp  Endocrinology [facility] ENDOCRINOLOGY   10/12/2017 12:30 PM Tian Berumen MD Neurology [facility]   10/31/2017 11:40 AM SALVADOR Lai   Cardiology  CARDIOLOGY  [place]   10/30/2017 12:00 PM [facility]   01/22/2018 09:30 AM Farhan Leigh MD Neurology [facility] NEUROLOGY ASSOC  Edwinn[facility]jot Suarez   10/03/2017 10:00 AM José Moreno MD Gastroenterology [facility] 9     Signatures   Electronically signed by : SALVADOR Mccoy ; Sep 12 2017  4:01PM EST                       (Author)

## 2017-10-27 NOTE — PROGRESS NOTES
Assessment  1  Hiatal hernia (553 3) (K44 9)   2  GERD without esophagitis (530 81) (K21 9)   3  Epigastric pain (789 06) (R10 13)   4  Dysphagia (787 20) (R13 10)   5  Chronic constipation (564 00) (K59 09)    Plan  Chronic constipation, Dysphagia, Epigastric pain, GERD without esophagitis    · Follow-up visit in 3 months Evaluation and Treatment  Follow-up  Status: Hold For -  Scheduling  Requested for: 23NPD6397   Ordered; For: Chronic constipation, Dysphagia, Epigastric pain, GERD without esophagitis; Ordered By: Vin Goddard Performed:  Due: 90VYH8108  Dysphagia    · AmLODIPine Besylate 2 5 MG Oral Tablet; TAKE 1 TABLET DAILY   Rx By: Vin Goddard; Dispense: 30 Days ; #:30 Tablet; Refill: 5;For: Dysphagia; JOSÉ MIGUEL = N; Sent To: [facility]    Discussion/Summary  Discussion Summary:   GERD and dysphagia: He has a 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  Fortunately his responded well to the combination of omeprazole and amlodipine  I will renew his amlodipine and I have asked him to call me if he has any worsening of symptoms  I have asked him to continue taking the MiraLax for his constipation  Since he is now getting occasional diarrhea I have asked him to hold the Amitiza to see if it is truly necessary  If his constipation recurs that he can restart the Amitiza  his constipation predominant irritable bowel syndrome is most likely exacerbated by his pain medications  Chief Complaint  Chief Complaint Free Text Note Form: f/u for abdominal pain  Pt c/o dysphagia and constipation  Chief Complaint Chronic Condition [facility]: Patient is here today for follow up of chronic conditions described in HPI  History of Present Illness  HPI: He feels the MiraLax and the Amitiza have been working well for his constipation  However recently has had some intermittent episodes of diarrhea and has even had to take Imodium sometimes   He feels his dysphagia improved significantly with the Norvasc but he has recently run out of the Norvasc tablets  He denies any new complaints such as abdominal pain, bleeding, or weight loss  History Reviewed: The history was obtained today from the patient and I agree with the documented history  Review of Systems  Complete-Male GI Adult:   Constitutional: No fever or chills, feels well, no tiredness, no recent weight gain or weight loss  Eyes: No complaints of eye pain, no red eyes, no discharge from eyes, no itchy eyes  ENT: no complaints of earache, no hearing loss, no nosebleeds, no nasal discharge, no sore throat, no hoarseness  Cardiovascular: No complaints of slow heart rate, no fast heart rate, no chest pain, no palpitations, no leg claudication, no lower extremity  Respiratory: shortness of breath-- and-- shortness of breath during exertion, but-- No complaints of shortness of breath, no wheezing, no cough, no SOB on exertion, no orthopnea or PND  Gastrointestinal: as noted in HPI  Genitourinary: No complaints of dysuria, no incontinence, no hesitancy, no nocturia, no genital lesion, no testicular pain  Musculoskeletal: No complaints of arthralgia, no myalgias, no joint swelling or stiffness, no limb pain or swelling  Integumentary: No complaints of skin rash or skin lesions, no itching, no skin wound, no dry skin  Neurological: No compliants of headache, no confusion, no convulsions, no numbness or tingling, no dizziness or fainting, no limb weakness, no difficulty walking  Psychiatric: Is not suicidal, no sleep disturbances, no anxiety or depression, no change in personality, no emotional problems  Endocrine: No complaints of proptosis, no hot flashes, no muscle weakness, no erectile dysfunction, no deepening of the voice, no feelings of weakness  Hematologic/Lymphatic: No complaints of swollen glands, no swollen glands in the neck, does not bleed easily, no easy bruising  ROS Reviewed:   ROS reviewed  Active Problems  1   Abdominal pain (789 00) (R10 9)   2  Angina pectoris (413 9) (I20 9)   3  Arteriosclerosis of coronary artery (414 00) (I25 10)   4  Ascites (789 59) (R18 8)   5  Atherosclerosis (440 9) (I70 90)   6  Atherosclerosis of arteries of extremities (440 20) (I70 209)   7  Benign essential hypertension (401 1) (I10)   8  Benign prostatic hyperplasia (BPH) with post-void dribbling (600 01,788 35)   (N40 1,N39 43)   9  Brain aneurysm (437 3) (I67 1)   10  Breast pain (611 71) (N64 4)   11  Bronchitis (490) (J40)   12  Chest pain (786 50) (R07 9)   13  Chronic constipation (564 00) (K59 09)   14  Chronic diastolic CHF (congestive heart failure) (428 32,428 0) (I50 32)   15  Chronic kidney disease, stage 3 (585 3) (N18 3)   16  Chronic migraine without aura (346 70) (G43 709)   17  CVA (cerebral vascular accident) (434 91) (I63 9)   18  Depression (311) (F32 9)   19  Diabetes mellitus type 2, uncontrolled (250 02) (E11 65)   20  Diabetes mellitus, type 2 (250 00) (E11 9)   21  Dysphagia (787 20) (R13 10)   22  Edema (782 3) (R60 9)   23  Epigastric pain (789 06) (R10 13)   24  GERD without esophagitis (530 81) (K21 9)   25  Hiatal hernia (553 3) (K44 9)   26  History of aneurysm (V12 59) (Z86 79)   27  History of CVA (cerebrovascular accident) (V12 54) (Z86 73)   28  Hypercholesterolemia (272 0) (E78 00)   29  Hypokalemia (276 8) (E87 6)   30  Hypothyroidism (244 9) (E03 9)   31  Insomnia (780 52) (G47 00)   32  Insulin long-term use (V58 67) (Z79 4)   33  Joint pain, knee (719 46) (M25 569)   34  Lumbago (724 2) (M54 5)   35  Medication overuse headache (339 3) (G44 40)   36  Memory disturbance (780 93) (R41 3)   37  Morbid or severe obesity due to excess calories (278 01) (E66 01)   38  Need for immunization against influenza (V04 81) (Z23)   39  Obstructive sleep apnea (327 23) (G47 33)   40  Other chronic pain (338 29) (G89 29)   41  Peripheral neuropathy (356 9) (G62 9)   42  Preop examination (V72 84) (Z01 818)   43   Preoperative cardiovascular examination (V72 81) (Z01 810)   44  Shortness of breath (786 05) (R06 02)   45  Stabbing headache (339 85) (G44 85)   46  Status post cardiac catheterization (V45 89) (Z98 890)   47  Tear of medial meniscus of right knee (836 0) (S83 241A)   48  Venous Intermittent Claudication (453 89)   49  Vitamin D deficiency (268 9) (E55 9)   50  Wheezing on auscultation (786 07) (R06 2)    Past Medical History  1  History of Coronary Artery Disease (V12 59)   2  History of Femoral Nerve Palsy On The Right (355 2)   3  History of acute bronchitis (V12 69) (Z87 09)   4  History of acute sinusitis (V12 69) (Z87 09)   5  History of Need for prophylactic vaccination and inoculation against influenza (V04 81)   (Z23)   6  Old myocardial infarction (412) (I25 2)   7  History of Stroke Syndrome (436)  Active Problems And Past Medical History Reviewed: The active problems and past medical history were reviewed and updated today  Surgical History  1  History of Brain Surgery   2  History of Neurol Drug Infusion Implantation Of Programmable Pump   3  History of Stent Indications: Restenosis At Previous PTCA Location  Surgical History Reviewed: The surgical history was reviewed and updated today  Family History  Mother    1  Family history of Diabetes Mellitus (V18 0)   2  Denied: Family history of colitis   3  Denied: Family history of colonic polyps   4  Denied: Family history of Crohn's disease   5  Denied: Family history of liver disease   6  Denied: Family history of Malignant neoplasm of colon, unspecified part of colon  Father    7  Family history of Coronary Artery Disease (V17 49)   8  Denied: Family history of colitis   9  Denied: Family history of colonic polyps   10  Denied: Family history of Crohn's disease   6  Denied: Family history of liver disease   12  Denied: Family history of Malignant neoplasm of colon, unspecified part of colon  Brother    15   Family history of diabetes mellitus (V18 0) (Z83 3)  Family History    14  Family history of arthritis (V17 7) (Z82 61)   15  Family history of malignant neoplasm (V16 9) (Z80 9)   16  Family history of Father  At Age 39  Family History Reviewed: The family history was reviewed and updated today  Social History   · Being A Social Drinker   · Daily caffeine consumption, 2-3 servings a day   · Denied: History of Drug use   · Never A Smoker  Social History Reviewed: The social history was reviewed and updated today  Current Meds   1  Advair Diskus 250-50 MCG/DOSE Inhalation Aerosol Powder Breath Activated; Therapy: 45NXY8913 to Recorded   2  Aldactone 25 MG Oral Tablet; Take 1 tablet daily; Therapy: 16Zjf7251 to Recorded   3  Amitiza 24 MCG Oral Capsule; TAKE ONE CAPSULE BY MOUTH TWICE A DAY; Therapy: 39HWT7838 to (Evaluate:43Vhc0807)  Requested for: 94FGT8630; Last   Rx:2014 Ordered   4  Aspirin 81 MG TABS; TAKE 1 TABLET DAILY; Therapy: 36KLV3381 to (Evaluate:33Uhb6105) Recorded   5  Atorvastatin Calcium 40 MG Oral Tablet; TAKE 1 TABLET AT BEDTIME; Therapy: 20ATE2209 to (97 650759)  Requested for: 28KRW7620; Last   Rx:38Hqa0706 Ordered   6  Baclofen 10 MG Oral Tablet; TAKE 1 TABLET 3 times daily; Therapy: 71NUY8156 to (Evaluate:2015)  Requested for: 02Lzy5225; Last   Rx:00Vnx9194 Ordered   7  Bystolic 10 MG Oral Tablet; take 1 tablet by mouth daily; Therapy: 68MUZ8090 to (Evaluate:17Mac1877)  Requested for: 16TFR4198; Last   Rx:2017 Ordered   8  Citalopram Hydrobromide 40 MG Oral Tablet; TAKE 1 TABLET BY MOUTH EVERY DAY; Therapy: 36MPF4912 to ([place])  Requested for: 62KBE9762; Last   Rx:2014 Ordered   9  Clopidogrel Bisulfate 75 MG Oral Tablet; take 1 tablet by mouth every day; Therapy: 90JWY6866 to (Evaluate:72Hld1982)  Requested for: 86UDN8610; Last   Rx:36Zxm4865 Ordered   10  Dicyclomine HCl - 10 MG Oral Capsule; TAKE 1 CAPSULE EVERY 6 HOURS AS    NEEDED;     Therapy: 02EXM0764 to (Evaluate:39Fuh3182)  Requested for: 27Jun2017; Last    MA:95CEZ2022 Ordered   11  Flomax 0 4 MG Oral Capsule; TAKE 1 CAPSULE Daily; Therapy: 96DJU5008 to Recorded   12  Gabapentin 800 MG Oral Tablet; Take 2 tablets daily as directed; Therapy: 01Aug2017 to Recorded   13  Jardiance 10 MG Oral Tablet; 1 Tab daily; Therapy: 20OWP4770 to (Evaluate:88Syz0555)  Requested for: 05JVJ6438; Last    QM:06WPQ6265 Ordered   14  Memantine HCl - 10 MG Oral Tablet; one tab in am for 10 days then twice a day afterthat; Therapy: 71YQZ0628 to (Last Rx:06Jun2017)  Requested for: 06Jun2017 Ordered   15  MetOLazone 5 MG Oral Tablet; 1 tablet weekly; Therapy: 12Sep2017 to Recorded   16  MS Contin 100 MG Oral Tablet Extended Release; 1 TABLET TWICE DAILY; Therapy: 15SIZ2777 to Recorded   17  Nitrostat 0 4 MG Sublingual Tablet Sublingual; TAKE 1 TABLET SUBLINGUALLY AS    NEEDED; Therapy: 24WDN8975 to (Evaluate:56Sjl5829)  Requested for: 85LSJ3069; Last    Rx:52Aht5406 Ordered   18  NovoLOG FlexPen 100 UNIT/ML Subcutaneous Solution Pen-injector; 25 units with    breakfast and lunch and 30 units before dinner  Requested for: 27Jun2017;    Last Rx:27Jun2017 Ordered   19  Omeprazole 20 MG Oral Tablet Delayed Release; 1 PO BID; Therapy: 76UXX2621 to (Last Rx:81Uzh3738)  Requested for: 75Fwz9150 Ordered   20  OxyCODONE HCl - 30 MG Oral Tablet; Therapy: 91RZV8356 to (Last Rx:02Apr2011)  Requested for: 36Ejy1533 Ordered   21  Potassium Chloride ER 20 MEQ Oral Tablet Extended Release; 1 tablet 3 times daily; Therapy: 38Nvy9751 to Recorded   22  Qvar 80 MCG/ACT Inhalation Aerosol Solution; INHALE 1 PUFF TWICE DAILY; Therapy: 51JNE9668 to (Last Rx:26Mar2016)  Requested for: 87YHJ5085 Ordered   23  Ranexa 1000 MG Oral Tablet Extended Release 12 Hour; take 1 tablet every 12 hours; Therapy: 81BGS7284 to (Evaluate:80Gqz4252)  Requested for: 82RGW8504; Last    Rx:15Mar2017 Ordered   24   Topiramate 100 MG Oral Tablet; TAKE 1 TABLET IN AM and Two at bedtime; Therapy: 88GRA4667 to (Evaluate:01Jun2018)  Requested for: 43AJI1121; Last    MN:52CKX4363 Ordered   25  Torsemide 100 MG Oral Tablet; 2 5 tablets daily in divided dosages; Therapy: 22Cwg0966 to (Evaluate:11Mar2018)  Requested for: 10Sdv2857; Last    Rx:46Hpe2068 Ordered   26  TraZODone HCl - 100 MG Oral Tablet; TAKE 1-3 TABLETS AT BEDTIME AS NEEDED FOR    DIFFICULTY SLEEPING; Therapy: 18MCD7624 to (Evaluate:26Dec2014)  Requested for: 77XIH1187; Last    Rx:26Nov2014 Ordered   27  Stefanie Coretta FlexTouch 200 UNIT/ML Subcutaneous Solution Pen-injector; 110 units daily at    bedtime; Therapy: 70VIJ0360 to (Evaluate:24Dec2017)  Requested for: 99QWY0632; Last    YH:32ABL1036 Ordered   28  Vitamin D (Ergocalciferol) 59427 UNIT Oral Capsule; TAKE 1 CAPSULE WEEKLY FOR    12 WEEKS; Therapy: 91BEQ2164 to (Last Rx:16Jun2017)  Requested for: 89EJS3019 Ordered   29  Vitamin D3 5000 UNIT Oral Capsule; 1 Cap daily; Therapy: 97RLL0707 to (Evaluate:11Jun2018)  Requested for: 37EON0918; Last    Rx:16Jun2017 Ordered   30  Zolpidem Tartrate 10 MG Oral Tablet; TAKE 1 TABLET AT BEDTIME AS NEEDED FOR    SLEEP; Therapy: 99NZY6809 to (Evaluate:19Nov2014)  Requested for: 29HBE0508; Last    XQ:42LEM0618; Status: ACTIVE - Renewal Voided Ordered  Medication List Reviewed: The medication list was reviewed and updated today  Allergies  1  No Known Drug Allergies  2  No Known Environmental Allergies   3  No Known Food Allergies    Vitals  Vital Signs    Recorded: 45TEE7901 10:19AM   Temperature 98 1 F, Oral   Heart Rate 71   Systolic 467, LUE, Sitting   Diastolic 84, LUE, Sitting   Height 6 ft 2 in   Weight 366 lb    BMI Calculated 46 99   BSA Calculated 2 81   O2 Saturation 91, RA     Physical Exam    Constitutional   General appearance: Abnormal  -- walks with assistance of a walker  Eyes   Conjunctiva and lids: No swelling, erythema, or discharge      Pupils and irises: Equal, round and reactive to light  Ears, Nose, Mouth, and Throat   External inspection of ears and nose: Normal     Nasal mucosa, septum, and turbinates: Normal without edema or erythema  Oropharynx: Normal with no erythema, edema, exudate or lesions  Pulmonary   Respiratory effort: No increased work of breathing or signs of respiratory distress  Auscultation of lungs: Clear to auscultation, equal breath sounds bilaterally, no wheezes, no rales, no rhonci  Cardiovascular   Auscultation of heart: Normal rate and rhythm, normal S1 and S2, without murmurs  Examination of extremities for edema and/or varicosities: Abnormal  -- trace LE edema  Abdomen   Abdomen: Abnormal  -- obese, mildly tender throughout especially in epigastrium  Liver and spleen: No hepatomegaly or splenomegaly  Lymphatic   Palpation of lymph nodes in neck: No lymphadenopathy  Musculoskeletal   Gait and station: Normal     Digits and nails: Normal without clubbing or cyanosis  Inspection/palpation of joints, bones, and muscles: Normal     Skin   Skin and subcutaneous tissue: Normal without rashes or lesions  Psychiatric   Orientation to person, place and time: Normal     Mood and affect: Normal          Future Appointments    Date/Time Provider Specialty Site   10/11/2017 01:40 PM SALVADOR Bentley  Endocrinology [facility] ENDOCRINOLOGY   10/12/2017 12:30 PM Juan Diego Robison MD Neurology [facility] NEUROLOGY [facility]   10/31/2017 11:40 AM SALVADOR Su   Cardiology  CARDIOLOGY  [place]   10/30/2017 12:00 PM Carolina Wright MD Neurology [address]   01/22/2018 09:30 AM Carolina Wright MD Neurology [address]     Signatures   Electronically signed by : Roque Amado MD; Oct  3 2017 10:49AM EST                       (Author)

## 2017-10-30 ENCOUNTER — ALLSCRIPTS OFFICE VISIT (OUTPATIENT)
Dept: OTHER | Facility: OTHER | Age: 57
End: 2017-10-30

## 2017-10-31 ENCOUNTER — ALLSCRIPTS OFFICE VISIT (OUTPATIENT)
Dept: OTHER | Facility: OTHER | Age: 57
End: 2017-10-31

## 2017-10-31 NOTE — PROGRESS NOTES
Assessment    1  Uncontrolled type 2 diabetes mellitus, with long-term current use of insulin (250 02,V58 67) (E11 65,Z79 4)   2  Benign essential hypertension (401 1) (I10)   3  Hypercholesterolemia (272 0) (E78 00)   4  Morbid or severe obesity due to excess calories (278 01) (E66 01)    Plan  Benign essential hypertension    · (1) MICROALBUMIN CREATININE RATIO, RANDOM URINE; Status:Active; Requestedfor:11Oct2017;    Perform:[facility]; Due:11Oct2018; Ordered; For:Benign essential hypertension; Ordered By:Gracie Dias;  Diabetes mellitus type 2, uncontrolled    · Jardiance 10 MG Oral Tablet   Rx By: Leopoldo Simmer; Dispense: 90 Days ; #:90 Tablet; Refill: 1;For: Diabetes mellitus type 2, uncontrolled; JOSÉ MIGUEL = N; Sent To: [address]; Last Updated By: Yady Crawford; 10/11/2017 2:07:16 PM  Hypercholesterolemia    · (1) T4, FREE; Status:Active; Requested for:11Oct2017;    Perform:[facility]; Due:11Oct2018; Ordered;For:Hypercholesterolemia; Ordered By:Gracie Dias;   · (1) TSH; Status:Active; Requested for:11Oct2017;    Perform:[facility]; Due:11Oct2018; Ordered;For:Hypercholesterolemia; Ordered By:Gracie Dias; Uncontrolled type 2 diabetes mellitus, with long-term current use of insulin    · Jardiance 25 MG Oral Tablet; 1 Tab daily   Rx By: Yady Crawford; Dispense: 90 Days ; #:90 Tablet; Refill: 3;For: Uncontrolled type 2 diabetes mellitus, with long-term current use of insulin; JOSÉ MIGUEL = N; Record   · (1) HEMOGLOBIN A1C; Status:Active; Requested for:11Oct2017;    Perform:[facility]; Due:11Oct2018; Ordered; For:Uncontrolled type 2 diabetes mellitus, with long-term current use of insulin; Ordered By:Gracie Dias;   · Continuous Glucose Monitoring CGM; Status:Complete;   Done: 58DFI3847   Perform:Not Applicable; SBR:37AZA1777;  Last Updated By:Sandro Knowles; 10/11/2017 2:25:14 PM;Ordered;type 2 diabetes mellitus, with long-term current use of insulin; Ordered By:Paulina Dias;   · Follow-up visit in 3 months Evaluation and Treatment  Follow-up  Status: Complete Done: 32CIH3326   Ordered; Uncontrolled type 2 diabetes mellitus, with long-term current use of insulin; Ordered By: Roc Childress Performed:  Due: 46GUK3114; Last Updated By: Shikha Menard; 10/11/2017 2:22:06 PM  Uncontrolled type 2 diabetes mellitus, with long-term current use of insulin, Vitamin Ddeficiency    · (1) COMPREHENSIVE METABOLIC PANEL; Status:Active; Requested for:11Oct2017;    Perform:[facility]; Due:11Oct2018; Ordered; For:Uncontrolled type 2 diabetes mellitus, with long-term current use of insulin, Vitamin D deficiency; Ordered By:Paulina Dias;  Vitamin D deficiency    · Vitamin D (Ergocalciferol) 82170 UNIT Oral Capsule   Rx By: Carmen Nuñez; Dispense: 0 Days ; #:12 Capsule; Refill: 0;For: Vitamin D deficiency; JOSÉ MIGUEL = N; Sent To: [facility]; Last Updated By: Roc Childress; 10/11/2017 2:07:16 PM   · Vitamin D3 5000 UNIT Oral Capsule; 1 Cap daily   Rx By: Roc Childress; Dispense: 30 Days ; #:30 Capsule; Refill: 11; Vitamin D deficiency; JOSÉ MIGUEL = N; Verified Transmission to [address]; Last Updated By: [employer] 10/11/2017 2:16:41 PM    Discussion/Summary  1  UNCONTROLLED TYPE 2 DIABETES ON LONG-TERM INSULIN THERAPY - sugars  mostly high in the morning, the rest of the day fairly stable with excursion secondary to dietary indiscretions  For now continue current regimen, focus on dietary and lifestyle modifications and weight loss, will refer for diagnostic continuous glucose monitor to get a better understanding of his blood sugar pattern  2  Hypertension- blood pressure at goal, continue current regimen  3   Hypercholesterolemia -continue statins, focus on dietary modifications, follow-up with nutritionist    morbid obesity -counseled on  metabolic complications of obesity, follow-up with the nutritionist Counseling Documentation With Imm: The patient was counseled regarding diagnostic results,-- instructions for management,-- risk factor reductions,-- impressions,-- risks and benefits of treatment options,-- importance of compliance with treatment  Patient's Capacity to Self-Care: Patient is able to Self-Care  Medication SE Review and Pt Understands Tx: Possible side effects of new medications were reviewed with the patient/guardian today  The treatment plan was reviewed with the patient/guardian  The patient/guardian understands and agrees with the treatment plan      Chief Complaint  Chief Complaint Free Text Note Form: Follow up  History of Present Illness  Diabetes: The patient is being seen for routine follow-up of Diabetes Mellitus 2  Current treatment includes Basal Insulin,-- NovoLog-- and-- jardiance  See Medication List for current medication(s)  See Medication List for dosage(s)  Source of information reported by review of the patient's logbook and indicates that the patient checks his blood sugar four times per day  Fasting blood sugars: generally 150-200  Pre-prandial blood sugars: 120-180s  Current pertinent lifestyle factors include obesity-- and-- disordered eating  Symptoms reported by the patient include extremity numbness,-- extremity paresthesias-- and-- edema, but-- no polydipsia,-- no polyuria,-- no blurred vision,-- no diarrhea,-- no lower extremity ulcers,-- no recent weight gain,-- no nausea-- and-- no recent weight loss  Family History: diabetes mellitus type 2  Hyperlipidemia (Follow-Up): The patient states his hyperlipidemia has been under good control since the last visit  Comorbid Illnesses: coronary artery disease,-- diabetes mellitus-- and-- hypertension  Symptoms: denies chest pain,-- denies intermittent leg claudication,-- denies muscle pain-- and-- stable muscle weakness  Associated symptoms include no focal neurologic deficits-- and-- no memory loss  Medications: the patient is adherent with his medication regimen  -- He denies medication side effects  Medication(s): a statin  Hypertension (Follow-Up): Symptoms: denies impaired vision,-- stable dyspnea,-- stable chest pain,-- denies intermittent leg claudication-- and-- stable lower extremity edema  Associated symptoms include no headache,-- no focal neurologic deficits-- and-- no memory loss  Medications: the patient is adherent with his medication regimen  -- He denies medication side effects  Medication(s): a diuretic,-- a beta blocking agent-- and-- a calcium channel blocker  Review of Systems  Endo Adult ROS Male Established v2 - [facility]:  Constitutional/General: recent weight gain,-- no recent weight loss,-- poor energy/fatigue,-- no increased energy level,-- insomnia/sleep problems,-- no fever-- and-- feeling weak  Heart: high blood pressure,-- chest pain/tightness-- and-- rapid/racing heart rate, but-- no palpitations  Genitourinary - Urinary frequent urination,-- excess urination-- and-- urinating during the night  Eyes: blurred vision-- and-- gritty/scratchy eyes, but-- no double vision,-- no bulging eyes-- and-- no excessive tearing  Mouth / Throat: difficulty swallowing, but-- no hoarseness  Neck: no lumps,-- no swollen glands,-- neck pain,-- neck stiffness-- and-- no enlarged thyroid  Respiratory: wheezing,-- asthma-- and-- no persistent cough  Musculoskeletal: muscle aches/pain,-- joint aches/pain-- and-- muscle weakness  Skin & Hair: no dry skin,-- acne,-- the hair texture was not oily,-- no hair loss-- and-- no excessive hair growth  Gastrointestinal: constipation,-- no diarrhea,-- wakes at night to drink-- and-- stomach ache  Neurological: no blackouts,-- weakness-- and-- no tremors    Genital: no testicular pain,-- no testicular lumps/bumps/mass-- and-- does not perform monthly testicular exam   Endocrine: no feeling hot frequently,-- feeling cold frequently,-- no shifts between feeling hot and cold,-- cold hands or feet,-- excessive sweating,-- no thyroid problems,-- blood sugar problems,-- excessive thirst,-- excessive hunger,-- no change in shoe size,-- no nausea or vomiting-- and-- shaky hands  ROS Reviewed:   ROS reviewed  Active Problems    1  Abdominal pain (789 00) (R10 9)   2  Angina pectoris (413 9) (I20 9)   3  Arteriosclerosis of coronary artery (414 00) (I25 10)   4  Ascites (789 59) (R18 8)   5  Atherosclerosis (440 9) (I70 90)   6  Atherosclerosis of arteries of extremities (440 20) (I70 209)   7  Benign essential hypertension (401 1) (I10)   8  Benign prostatic hyperplasia (BPH) with post-void dribbling (600 01,788 35) (N40 1,N39 43)   9  Brain aneurysm (437 3) (I67 1)   10  Breast pain (611 71) (N64 4)   11  Bronchitis (490) (J40)   12  Chest pain (786 50) (R07 9)   13  Chronic constipation (564 00) (K59 09)   14  Chronic diastolic CHF (congestive heart failure) (428 32,428 0) (I50 32)   15  Chronic kidney disease, stage 3 (585 3) (N18 3)   16  Chronic migraine without aura (346 70) (G43 709)   17  CVA (cerebral vascular accident) (434 91) (I63 9)   18  Depression (311) (F32 9)   19  Dysphagia (787 20) (R13 10)   20  Edema (782 3) (R60 9)   21  Epigastric pain (789 06) (R10 13)   22  GERD without esophagitis (530 81) (K21 9)   23  Hiatal hernia (553 3) (K44 9)   24  History of aneurysm (V12 59) (Z86 79)   25  History of CVA (cerebrovascular accident) (V12 54) (Z86 73)   32  Hypercholesterolemia (272 0) (E78 00)   27  Hypokalemia (276 8) (E87 6)   28  Insomnia (780 52) (G47 00)   29  Joint pain, knee (719 46) (M25 569)   30  Lumbago (724 2) (M54 5)   31  Medication overuse headache (339 3) (G44 40)   32  Memory disturbance (780 93) (R41 3)   33  Morbid or severe obesity due to excess calories (278 01) (E66 01)   34  Need for immunization against influenza (V04 81) (Z23)   35  Obstructive sleep apnea (327 23) (G47 33)   36  Other chronic pain (338 29) (G89 29)   37  Peripheral neuropathy (356 9) (G62 9)   38   Preop examination (V72 84) (Z01 818)   39  Preoperative cardiovascular examination (V72 81) (Z01 810)   40  Shortness of breath (786 05) (R06 02)   41  Stabbing headache (339 85) (G44 85)   42  Status post cardiac catheterization (V45 89) (Z98 890)   43  Tear of medial meniscus of right knee (836 0) (S83 241A)   44  Venous Intermittent Claudication (453 89)   45  Vitamin D deficiency (268 9) (E55 9)   46  Wheezing on auscultation (786 07) (R06 2)    Past Medical History    1  History of Coronary Artery Disease (V12 59)   2  History of Femoral Nerve Palsy On The Right (355 2)   3  History of acute bronchitis (V12 69) (Z87 09)   4  History of acute sinusitis (V12 69) (Z87 09)   5  History of Need for prophylactic vaccination and inoculation against influenza (V04 81) (Z23)   6  Old myocardial infarction (412) (I25 2)   7  History of Stroke Syndrome (436)  Active Problems And Past Medical History Reviewed: The active problems and past medical history were reviewed and updated today  Surgical History    1  History of Brain Surgery   2  History of Neurol Drug Infusion Implantation Of Programmable Pump   3  History of Stent Indications: Restenosis At Previous PTCA Location  Surgical History Reviewed: The surgical history was reviewed and updated today  Family History  Mother    1  Family history of Diabetes Mellitus (V18 0)   2  Denied: Family history of colitis   3  Denied: Family history of colonic polyps   4  Denied: Family history of Crohn's disease   5  Denied: Family history of liver disease   6  Denied: Family history of Malignant neoplasm of colon, unspecified part of colon  Father    7  Family history of Coronary Artery Disease (V17 49)   8  Denied: Family history of colitis   9  Denied: Family history of colonic polyps   10  Denied: Family history of Crohn's disease   6  Denied: Family history of liver disease   12   Denied: Family history of Malignant neoplasm of colon, unspecified part of colon  Brother    15  Family history of diabetes mellitus (V18 0) (Z83 3)  Family History    14  Family history of arthritis (V17 7) (Z82 61)   15  Family history of malignant neoplasm (V16 9) (Z80 9)   16  Family history of Father  At Age 39  Family History Reviewed: The family history was reviewed and updated today  Social History     · Being A Social Drinker   · Daily caffeine consumption, 2-3 servings a day   · Denied: History of Drug use   · Never A Smoker  Social History Reviewed: The social history was reviewed and updated today  Current Meds   1  Advair Diskus 250-50 MCG/DOSE Inhalation Aerosol Powder Breath Activated; Therapy: 65XHP6355 to Recorded   2  Aldactone 25 MG Oral Tablet; Take 1 tablet daily; Therapy: 62Nah5510 to Recorded   3  Amitiza 24 MCG Oral Capsule; TAKE ONE CAPSULE BY MOUTH TWICE A DAY; Therapy: 04GST4579 to (Evaluate:95Tzb8694)  Requested for: 99PLC6543; Last Rx:2014 Ordered   4  AmLODIPine Besylate 2 5 MG Oral Tablet; TAKE 1 TABLET DAILY; Therapy: 62CQK1357 to (Kerwin Dickinson)  Requested for: 47LVA0793; Last Rx:24Ziq0971 Ordered   5  Aspirin 81 MG TABS; TAKE 1 TABLET DAILY; Therapy: 87RSO0174 to (Evaluate:29Eay7547) Recorded   6  Atorvastatin Calcium 40 MG Oral Tablet; TAKE 1 TABLET AT BEDTIME; Therapy: 84SIC4849 to (72 240 26 09)  Requested for: 93AFY7962; Last Rx:00Zne7377 Ordered   7  Baclofen 10 MG Oral Tablet; TAKE 1 TABLET 3 times daily; Therapy: 87OUI3084 to (Evaluate:2015)  Requested for: 86Thz8846; Last Rx:92Uwm0334 Ordered   8  Bystolic 10 MG Oral Tablet; take 1 tablet by mouth daily; Therapy: 99VQD9428 to (Evaluate:95Bvp7095)  Requested for: 34WJN3988; Last Rx:2017 Ordered   9  Citalopram Hydrobromide 40 MG Oral Tablet; TAKE 1 TABLET BY MOUTH EVERY DAY; Therapy: 27PZO1212 to (Bhargav Le)  Requested for: 99ICU5349; Last Rx:2014 Ordered   10   Clopidogrel Bisulfate 75 MG Oral Tablet; take 1 tablet by mouth every day; Therapy: 86ZJM7272 to (Evaluate:29Feu9284)  Requested for: 56VIS7646; Last  Rx:24Tqa1489 Ordered   11  Dicyclomine HCl - 10 MG Oral Capsule; TAKE 1 CAPSULE EVERY 6 HOURS AS  NEEDED; Therapy: 02WBP5494 to (Evaluate:50Qrq9176)  Requested for: 92INI9693; Last  DA:46QEH7339 Ordered   12  Flomax 0 4 MG Oral Capsule; TAKE 1 CAPSULE Daily; Therapy: 94OFL9216 to Recorded   13  Gabapentin 800 MG Oral Tablet; Take 2 tablets daily as directed; Therapy: 47Nce3332 to Recorded   14  Jardiance 10 MG Oral Tablet; 1 Tab daily; Therapy: 15ABU7867 to (Evaluate:22Vth3847)  Requested for: 61PNK1885; Last  CU:93EBC3197 Ordered   15  Memantine HCl - 10 MG Oral Tablet; one tab in am for 10 days then twice a day afterthat; Therapy: 74IJY1797 to (Last Rx:06Jun2017)  Requested for: 06Jun2017 Ordered   16  MetOLazone 5 MG Oral Tablet; 1 tablet weekly; Therapy: 89Ptx6765 to Recorded   17  MS Contin 100 MG Oral Tablet Extended Release; 1 TABLET TWICE DAILY; Therapy: 09GOH5046 to Recorded   18  Nitrostat 0 4 MG Sublingual Tablet Sublingual; TAKE 1 TABLET SUBLINGUALLY AS  NEEDED; Therapy: 36UKH1668 to (Evaluate:93Eqx5709)  Requested for: 70AXE0898; Last  Rx:66Smf6445 Ordered   19  NovoLOG FlexPen 100 UNIT/ML Subcutaneous Solution Pen-injector; 25 units with  breakfast and lunch and 30 units before dinner  Requested for: 27Jun2017;  Last BETTIE:50XUF6416 Ordered   20  Omeprazole 20 MG Oral Tablet Delayed Release; 1 PO BID; Therapy: 93DKR0519 to (Last Rx:72Ecp9209)  Requested for: 01Sdp7764 Ordered   21  OxyCODONE HCl - 30 MG Oral Tablet; Therapy: 16STM6977 to (Last Rx:02Apr2011)  Requested for: 02Apr2011 Ordered   22  Potassium Chloride ER 20 MEQ Oral Tablet Extended Release; 1 tablet 3 times daily; Therapy: 12Wck4074 to Recorded   23  Qvar 80 MCG/ACT Inhalation Aerosol Solution; INHALE 1 PUFF TWICE DAILY; Therapy: 52SGZ4071 to (Last Rx:26Mar2016)  Requested for: 36YBC9053 Ordered   24   Ranexa 1000 MG Oral Tablet Extended Release 12 Hour; take 1 tablet every 12 hours; Therapy: 46TVL6790 to (Evaluate:60Wpl2068)  Requested for: 54ARJ7802; Last  Rx:2017 Ordered   25  Topiramate 100 MG Oral Tablet; TAKE 1 TABLET IN AM and Two at bedtime; Therapy: 61NHJ0911 to (Evaluate:2018)  Requested for: 40NYC0428; Last  KI:25HMX0960 Ordered   26  Torsemide 100 MG Oral Tablet; 2 5 tablets daily in divided dosages; Therapy: 81Tvf8360 to (Evaluate:2018)  Requested for: 26Euu1303; Last  Rx:92Brg5455 Ordered   27  TraZODone HCl - 100 MG Oral Tablet; TAKE 1-3 TABLETS AT BEDTIME AS NEEDED FOR  DIFFICULTY SLEEPING; Therapy: 67FVP8111 to (Evaluate:27Bzk9368)  Requested for: 74SKW9299; Last  Rx:2014 Ordered   28  Sobeida Philipvera FlexTouch 200 UNIT/ML Subcutaneous Solution Pen-injector; 110 units daily at  bedtime; Therapy: 39KAJ7397 to (Evaluate:52Mht3811)  Requested for: 13UFV2978; Last  NI:93CYM7040 Ordered   29  Vitamin D (Ergocalciferol) 33527 UNIT Oral Capsule; TAKE 1 CAPSULE WEEKLY FOR  12 WEEKS; Therapy: 40OSS2578 to (Last Rx:2017)  Requested for: 79UQQ4610 Ordered   30  Vitamin D3 5000 UNIT Oral Capsule; 1 Cap daily; Therapy: 68DKH1024 to (Evaluate:2018)  Requested for: 23JYX6627; Last  Rx:2017 Ordered   31  Zolpidem Tartrate 10 MG Oral Tablet; TAKE 1 TABLET AT BEDTIME AS NEEDED FOR  SLEEP; Therapy: 78MOH9442 to (Evaluate:2014)  Requested for: 57JFA5429; Last  P86GFY3119; Status: ACTIVE - Renewal Voided Ordered  Medication List Reviewed: The medication list was reviewed and updated today  Allergies  1  No Known Drug Allergies  2  No Known Environmental Allergies   3  No Known Food Allergies    Vitals  Vital Signs    Recorded: 99FLX5003 01:45PM   Heart Rate 80   Systolic 946   Diastolic 84   Height 6 ft 2 in   Weight 369 lb 4 96 oz   BMI Calculated 47 42   BSA Calculated 2 82       Physical Exam   Constitutional  General appearance: No acute distress, well appearing and well nourished     Eyes  Conjunctiva and lids: No swelling, erythema, or discharge  Pupils: Equal, round and reactive to light  The sclera are anicteric  Extraocular movements are intact  Ears, Nose, Mouth, and Throat  External inspection of ears, nose and lips: Normal    Exam of Head: The head is atraumatic and normocephalic  Neck: The neck is supple  The thyroid is normal in size with no palpable nodules  Pulmonary  Auscultation of lungs: Clear to auscultation bilaterally with normal chest expansion  Cardiovascular  Auscultation of heart: Normal rate and rhythm with no murmurs, gallops or rubs  Examination of extremities for edema and/or varicosities: Abnormal  -- + edema b/l LE  Abdomen  Abdomen: Abdomen is soft, non-tender with normal bowel sounds  Lymphatic  Palpation of lymph nodes: No supraclavicular or suboccipital lymphadenopathy  Musculoskeletal  Gait and station: Abnormal  -- USING A WALKER  Inspection/palpation of joints, bones, and muscles: Muscle bulk and tone is normal    Skin  Skin and subcutaneous tissue: Normal skin temperature and color  Neurologic  Motor Strength: Strength is 5/5 bilaterally  Psychiatric  Orientation to person, place and time: Normal    Mood and affect: Affect and attention span are normal        Future Appointments    Date/Time Provider Specialty Site   10/12/2017 12:30 PM Chanel Finney [facility] Neurology [facility] NEUROLOGY [facility]   10/30/2017 12:00 PM Kamlesh Dumont MD Neurology [facility] NEUROLOGY [facility]   10/31/2017 11:40 AM SALVADOR Norman  Cardiology  CARDIOLOGY  [place]   01/15/2018 02:00 PM SALVADOR Laguerre   Endocrinology [facility] ENDOCRINOLOGY   01/22/2018 09:30 AM Kamlesh Dumont MD Neurology [facility] 21       Signatures   Electronically signed by : SALVADOR Moraes ; Oct 11 2017  2:56PM EST                       (Author)

## 2017-10-31 NOTE — PROGRESS NOTES
1 Tab daily; Therapy: 49HUD9716 to (Evaluate:06Oct2018); Last Rx:11Zeh5292 Ordered  15  Memantine HCl - 10 MG Oral Tablet; one tab in am for 10 days then twice a day afterthat; Therapy: 58SFQ6790 to (Last Rx:06Jun2017)  Requested for: 06Jun2017 Ordered  16  MetOLazone 5 MG Oral Tablet; 1 tablet weekly; Therapy: 55Per6695 to Recorded  17  MS Contin 100 MG Oral Tablet Extended Release; 1 TABLET TWICE DAILY; Therapy: 37SNP0734 to Recorded  18  Nitrostat 0 4 MG Sublingual Tablet Sublingual; TAKE 1 TABLET SUBLINGUALLY AS    NEEDED; Therapy: 10WFJ9950 to (Evaluate:83Xla9710)  Requested for: 35PQL8815; Last    Rx:79Wuv2514 Ordered  19  NovoLOG FlexPen 100 UNIT/ML Subcutaneous Solution Pen-injector; 25 units with    breakfast and lunch and 30 units before dinner  Requested for: 27Jun2017;    Last BF:80XOG9942 Ordered  20  Omeprazole 20 MG Oral Tablet Delayed Release; 1 PO BID; Therapy: 54XYL6792 to (Last Rx:92Bgr7024)  Requested for: 04Dro7599 Ordered  21  OxyCODONE HCl - 30 MG Oral Tablet; Therapy: 57JAK8888 to (Last Rx:02Apr2011)  Requested for: 02Apr2011 Ordered  22  Potassium Chloride ER 20 MEQ Oral Tablet Extended Release; 1 tablet 3 times daily; Therapy: 71Qgv6274 to Recorded  23  Qvar 80 MCG/ACT Inhalation Aerosol Solution; INHALE 1 PUFF TWICE DAILY; Therapy: 96CZF3633 to (Last Rx:26Mar2016)  Requested for: 53XQC1777 Ordered  24  Ranexa 1000 MG Oral Tablet Extended Release 12 Hour; take 1 tablet every 12 hours; Therapy: 69MCF3259 to (Randolph Graven)  Requested for: 53QCB5978; Last    Rx:26Vga4556 Ordered  25  Topiramate 100 MG Oral Tablet; TAKE 1 TABLET IN AM and Two at bedtime; Therapy: 42IRO4291 to (Evaluate:01Jun2018)  Requested for: 81BOP3969; Last    HI:18TNI0991 Ordered  26  Torsemide 100 MG Oral Tablet; 2 5 tablets daily in divided dosages;     Therapy: 22Ima4918 to (Evaluate:11Mar2018)  Requested for: 12Zvt0110; Last    Rx:35Zgl7351 Ordered  27  TraZODone HCl - 100 MG Oral Tablet; TAKE 1-3 TABLETS AT BEDTIME AS NEEDED FOR    DIFFICULTY SLEEPING; Therapy: 93RXU8411 to (Evaluate:68Ddu2061)  Requested for: 91BOR7891; Last    Rx:26Nov2014 Ordered  28  Elvira Lua FlexTouch 200 UNIT/ML Subcutaneous Solution Pen-injector; 110 units daily at    bedtime; Therapy: 58EQQ1306 to (Evaluate:28Qmm8685)  Requested for: 94CYK6875; Last    Rx:12Emo2456 Ordered  34  Vitamin D3 5000 UNIT Oral Capsule; 1 Cap daily; Therapy: 62BKK7044 to ([phone])  Requested for: 03KLP0254; Last    Rx:41Ope2737 Ordered  27  Zolpidem Tartrate 10 MG Oral Tablet; TAKE 1 TABLET AT BEDTIME AS NEEDED FOR    SLEEP; Therapy: 94SVV7313 to (Evaluate:19Nov2014)  Requested for: 13GZJ1268; Last    JL:82WMH7091; Status: ACTIVE - Renewal Voided Ordered    Allergies  1  No Known Drug Allergies  2  No Known Environmental Allergies  3  No Known Food Allergies    Vitals   Recorded: 57YIR3248 12:05PM   Temperature 97 8 F   Heart Rate 68   Systolic 932   Diastolic 56     Procedure  Procedure: Headache botox injection  Indication: Chronic migraine headache  Risks, benefits and alternatives were discussed with the patient  We discussed possible complications, including infection,-- bleeding-- and-- allergic reaction  Written consent was obtained prior to the procedure  The site was prepped with an alcohol swab  Anesthesia: No anesthesia was needed  Procedure Note: The patient was placed in the upright position  200 --Mml of Botox-A and    5 unit(s) was injected into the procerus muscle  5 unit(s) was injected into the  right  muscle  5 unit(s) was injected into the  left  muscle  10 unit(s) was injected into the  right frontalis muscle  10 unit(s) was injected into the  left frontalis muscle  20 unit(s) was injected into the  right temporalis muscle  20 unit(s) was injected into the  left temporalis muscle  15 unit(s) was injected into the  right occipitalis muscle     15 unit(s) was injected into the  left occipitalis muscle  10 unit(s) was injected into the  right cervical paraspinal muscle  10 unit(s) was injected into the  left cervical paraspinal muscle  15 unit(s) was injected into the  right trapezius muscle  15 unit(s) was injected into the  left trapezius muscle  45 units in the apex of his head which was medically necessary  A total of 200units were used  A total of 0units were discarded  Botox Lot:  Lot number:  [id number]  -- Expiration date:  04 1 2020  (BOTH FOR SAME VIAL)   Patient Status:  the patient tolerated the procedure well  Complications:  there were no complications  Follow-up in the office in 3 month(s)  100 UNITS/2 CC SALINE X2       1 Amended By: Shari Joseph; Oct 30 2017 12:46 PM EST    Assessment  1  Chronic migraine without aura (346 70) (G43 709)    Plan  Chronic migraine without aura    · Renew: Topiramate 100 MG Oral Tablet; TAKE 1 TABLET IN AM and Two at bedtime  Rx By: Bryson Parr; Dispense: 30 Days ; #:90 Tablet; Refill: 6;For: Chronic migraine   without aura; JOSÉ MIGUEL = N; Sent To: [facility]   · Administered: Botox 100 UNIT Injection Solution Reconstituted  Rx By: Bryson Parr; For: Chronic migraine without aura; Dose of 200 UNIT;   Intramuscular; JOSÉ MIGUEL = N; Administered: 10/30/2017 12:19:00 PM; Last Updated By:   Shari Joseph; 10/30/2017 12:19:48 PM   · Chemodenervation of muscles innervated by facial, trigeminal, c-spine, accessory  nerves - POC; Status:Need Information - Financial Authorization; Requested  BJL:[id number];   Perform: In Office; [phone]; Ordered; For:Chronic migraine without aura; Ordered   By:Faiza Grossman;   · Follow-up visit in 3 months Evaluation and Treatment  Follow-up  Status: Complete   Done: 27KPI3016  Ordered; For: Chronic migraine without aura;   Ordered By: Bryson Parr  Performed:     Due: 31JGC3849; Last Updated By: Jennie Guallpa; 10/30/2017 12:33:45 PM  Vitamin D deficiency    · Renew: Vitamin D3 5000 UNIT Oral Capsule; 1 Cap daily  Rx By: Layla Pressley; Dispense: 30 Days ; #:30 Capsule; Refill: 11; For: Vitamin D   deficiency; JOSÉ MIGUEL = N; Print Rx    Future Appointments    Date/Time Provider Specialty Site   11/09/2017 11:00 AM SALVADOR Forman  Endocrinology [facility] ENDOCRINOLOGY   01/16/2018 02:00 PM SALVADOR Forman  Endocrinology [facility] ENDOCRINOLOGY   11/09/2017 10:00 AM Endocrinology, Nurse Schedule  [facility] ENDOCRINOLOGY   10/31/2017 11:40 AM SALVADOR Mcmillan   Cardiology  CARDIOLOGY  [place]   01/22/2018 09:30 AM Layla Pressley MD Neurology [facility]   02/01/2018 12:30 PM Darell [facility] Neurology [facility]     Signatures   Electronically signed by : Justice Hendricks MD; Oct 30 2017 12:48PM EST                       (Author)

## 2017-11-01 ENCOUNTER — TELEPHONE (OUTPATIENT)
Dept: PREADMISSION TESTING | Facility: HOSPITAL | Age: 57
End: 2017-11-01

## 2017-11-01 RX ORDER — RANOLAZINE 1000 MG/1
TABLET, EXTENDED RELEASE ORAL
COMMUNITY
Start: 2015-01-26 | End: 2017-11-01

## 2017-11-01 NOTE — PROGRESS NOTES
Assessment  Assessed    1  Arteriosclerosis of coronary artery (414 00) (I25 10)   2  Chronic diastolic CHF (congestive heart failure) (428 32,428 0) (I50 32)   3  Angina pectoris (413 9) (I20 9)   4  Hypercholesterolemia (272 0) (E78 00)   5  Benign essential hypertension (401 1) (I10)    Plan  Angina pectoris, Arteriosclerosis of coronary artery    · CARDIAC CATHETERIZATION; Status:Hold For - Scheduling; Requested for:31Oct2017;    Perform:[facility]; Due:31Oct2018; Ordered; For:Angina pectoris, Arteriosclerosis of coronary artery; Ordered By:Chirag Lozano;  Chronic diastolic CHF (congestive heart failure)    · (1) BASIC METABOLIC PROFILE; Status:Active; Requested BVB:67EQB8232;    Perform:[facility]; GYY:85JKX5186; Ordered; For:Chronic diastolic CHF (congestive heart failure); Ordered By:Pam Lozano;    Discussion/Summary  Cardiology Discussion Summary Free Text Note Form [facility]: It is my impression that the patient is having increase in chest discomfort  This is relieved with nitroglycerin  The pattern is somewhat atypical in that it occurs more at night but he does get this with exertion during the day  In light of this pattern and the fact that he is on beta-blockers and ranolazine at the maximum dosage, we will proceed with cardiac catheterization and see if there is any intervention that would help the patient  The procedure was discussed with the patient including the risks of myocardial infarction, stroke, vascular injury, death, bleeding, thrombosis, pseudoaneurysm, allergy, infection, neural injury and renal failure  We will check a BMP in the next few days prior to doing the procedure to make sure his kidneys are optimized  We did discuss the potential for catheter based intervention including the risks of acute closure with resultant myocardial infarction and need for urgent surgery at increased risk and the 1% chance or less of death   The patient understood all this and consented to the procedure  I will have him hold his diuretics to morning of the test  He will hold his morning insulin the day of the procedure  He appears to be relatively well compensated in terms of diastolic heart failure at this time will continue the same diuretic regimen which includes high-dose torsemide and weekly metolazone  He also takes spironolactone  He will continue atorvastatin for hyperlipidemia  His blood pressure appears to be low normal on his medical regimen which includes nebivolol  He is already on dual antiplatelet therapy  Chief Complaint  Chief Complaint Free Text Note Form: Patient here for FU of chronic diastolic CHF    has atypical chest pain  Rachel Mcnulty just admitted for this and ruled out  Rachel Mcnulty known   CAD s/p MI May 2012 with BMS to RCA  Rachel Mcnulty the patient also has HTN, HPL   he had stenting of the proximal circumflex with residual disease in the distal circumflex and mid LAD (Manjeet/15)   had   cath and stent of prox LAD and distal Cx January/2016 with    also has HLP, edema   Chief Complaint Chronic Condition [facility]: Patient is here today for follow up of chronic conditions described in HPI  History of Present Illness  Cardiology HPI Free Text Note Form [facility]: The patient has had ongoing chest pain  It is generally worse at night especially in the middle of the night until noon  He does get it with exertion at other times  He does note some increased edema  He does have dyspnea  He does have positional dizziness and palpitations      Review of Systems  Cardiology Male ROS:     Cardiac: chest pain,-- has swelling in the -- and-- palpitations present , but-- no fainting/blackouts,-- no heart murmur present,-- no syncope/fainting,-- no AM fatigue-- and-- no witnessed apnea episodes  Skin: No complaints of nonhealing sores or skin rash     Genitourinary: frequent urination at night-- and-- prostate problems, but-- no recurrent urinary tract infections,-- no difficult urination,-- no blood in urine,-- no kidney stones,-- no loss of bladder control,-- no kidney problems-- and-- no erectile dysfunction   Psychological: depression,-- anxiety-- and-- panic attacks, but-- no difficulty concentrating-- and-- no palpitations present  General: lack of energy/fatigue, but-- no trouble sleeping,-- no appetite changes,-- no changes in weight,-- no fever,-- no night sweats-- and-- no frequent infections  Respiratory: shortness of breath, but-- no cough/sputum,-- no wheezing,-- no phlegm-- and-- no hemoptysis  HEENT: No complaints of serious problems, hearing problems, nose problems, throat problems, or snoring  Gastrointestinal: diarrhea-- and-- constipation, but-- no liver problems,-- no nausea,-- no vomiting,-- no heartburn,-- no bloody stools,-- no abdonimal pain-- and-- no rectal bleeding   Hematologic: No complaints of bleeding disorders, anemia, blood clots, or excessive brusing  Neurological: numbnes,-- tingling,-- headaches-- and-- dizziness, but-- no weakness,-- no seizures,-- no diplopia-- and-- no daytime sleepiness   Musculoskeletal: arthritis-- and-- back pain   ROS Reviewed:   ROS reviewed  Active Problems  Problems    1  Abdominal pain (789 00) (R10 9)   2  Abnormal laboratory test (796 4) (R89 9)   3  Angina pectoris (413 9) (I20 9)   4  Arteriosclerosis of coronary artery (414 00) (I25 10)   5  Ascites (789 59) (R18 8)   6  Atherosclerosis (440 9) (I70 90)   7  Atherosclerosis of arteries of extremities (440 20) (I70 209)   8  Benign essential hypertension (401 1) (I10)   9  Benign prostatic hyperplasia (BPH) with post-void dribbling (600 01,788 35)   (N40 1,N39 43)   10  Brain aneurysm (437 3) (I67 1)   11  Breast pain (611 71) (N64 4)   12  Bronchitis (490) (J40)   13  Chest pain (786 50) (R07 9)   14  Chronic constipation (564 00) (K59 09)   15  Chronic diastolic CHF (congestive heart failure) (428 32,428 0) (I50 32)   16  Chronic kidney disease, stage 3 (585 3) (N18 3)   17   Chronic migraine without aura (346 70) (G43 709)   18  CVA (cerebral vascular accident) (434 91) (I63 9)   19  Depression (311) (F32 9)   20  Dysphagia (787 20) (R13 10)   21  Edema (782 3) (R60 9)   22  Epigastric pain (789 06) (R10 13)   23  GERD without esophagitis (530 81) (K21 9)   24  Hiatal hernia (553 3) (K44 9)   25  History of aneurysm (V12 59) (Z86 79)   26  History of CVA (cerebrovascular accident) (V12 54) (Z86 73)   32  Hypercholesterolemia (272 0) (E78 00)   28  Hypokalemia (276 8) (E87 6)   29  Insomnia (780 52) (G47 00)   30  Joint pain, knee (719 46) (M25 569)   31  Lumbago (724 2) (M54 5)   32  Medication overuse headache (339 3) (G44 40)   33  Memory disturbance (780 93) (R41 3)   34  Morbid or severe obesity due to excess calories (278 01) (E66 01)   35  Need for immunization against influenza (V04 81) (Z23)   36  Obstructive sleep apnea (327 23) (G47 33)   37  Other chronic pain (338 29) (G89 29)   38  Peripheral neuropathy (356 9) (G62 9)   39  Preop examination (V72 84) (Z01 818)   40  Preoperative cardiovascular examination (V72 81) (Z01 810)   41  Shortness of breath (786 05) (R06 02)   42  Stabbing headache (339 85) (G44 85)   43  Status post cardiac catheterization (V45 89) (Z98 890)   44  Tear of medial meniscus of right knee (836 0) (S83 241A)   45  Uncontrolled type 2 diabetes mellitus, with long-term current use of insulin    (250 02,V58 67) (E11 65,Z79 4)   46  Venous Intermittent Claudication (453 89)   47  Ventricular bigeminy (427 89) (I49 9)   48  Vitamin D deficiency (268 9) (E55 9)   49  Wheezing on auscultation (786 07) (R06 2)    Past Medical History  Problems    1  History of Coronary Artery Disease (V12 59)   2  History of Femoral Nerve Palsy On The Right (355 2)   3  History of acute bronchitis (V12 69) (Z87 09)   4  History of acute sinusitis (V12 69) (Z87 09)   5  History of Need for prophylactic vaccination and inoculation against influenza (V04 81)   (Z23)   6   Old myocardial infarction (412) (I25 2)   7  History of Stroke Syndrome (436)  Active Problems And Past Medical History Reviewed: The active problems and past medical history were reviewed and updated today  Surgical History  Problems    1  History of Brain Surgery   2  History of Neurol Drug Infusion Implantation Of Programmable Pump   3  History of Stent Indications: Restenosis At Previous PTCA Location  Surgical History Reviewed: The surgical history was reviewed and updated today  Family History  Mother    1  Family history of Diabetes Mellitus (V18 0)   2  Denied: Family history of colitis   3  Denied: Family history of colonic polyps   4  Denied: Family history of Crohn's disease   5  Denied: Family history of liver disease   6  Denied: Family history of Malignant neoplasm of colon, unspecified part of colon  Father    7  Family history of Coronary Artery Disease (V17 49)   8  Denied: Family history of colitis   9  Denied: Family history of colonic polyps   10  Denied: Family history of Crohn's disease   6  Denied: Family history of liver disease   12  Denied: Family history of Malignant neoplasm of colon, unspecified part of colon  Brother    15  Family history of diabetes mellitus (V18 0) (Z83 3)  Family History    14  Family history of arthritis (V17 7) (Z82 61)   15  Family history of malignant neoplasm (V16 9) (Z80 9)   16  Family history of Father  At Age 39  Family History Reviewed: The family history was reviewed and updated today  Social History  Problems    · Being A Social Drinker   · Daily caffeine consumption, 2-3 servings a day   · Denied: History of Drug use   · Never A Smoker  Social History Reviewed: The social history was reviewed and updated today  The social history was reviewed and is unchanged  Current Meds   1  Advair Diskus 250-50 MCG/DOSE Inhalation Aerosol Powder Breath Activated; Therapy: 39UIV4358 to Recorded   2  Aldactone 25 MG Oral Tablet;  Take 1 tablet daily; Therapy: 01Aug2017 to Recorded   3  Amitiza 24 MCG Oral Capsule; TAKE ONE CAPSULE BY MOUTH TWICE A DAY; Therapy: 60TLM4617 to (Evaluate:76Jsu1530)  Requested for: 92SSN8250; Last   Rx:26Nov2014 Ordered   4  AmLODIPine Besylate 2 5 MG Oral Tablet; TAKE 1 TABLET DAILY; Therapy: 10WZG9724 to (Ebenezer Flatness)  Requested for: 98PCU4840; Last   Rx:25Bcw5880 Ordered   5  Aspirin 81 MG TABS; TAKE 1 TABLET DAILY; Therapy: 51WFJ7915 to (Evaluate:75Qho6912) Recorded   6  Atorvastatin Calcium 40 MG Oral Tablet; TAKE 1 TABLET AT BEDTIME; Therapy: 32TMV6540 to (61 23 68)  Requested for: 45ANI3060; Last   Rx:42Nbt6431 Ordered   7  Baclofen 10 MG Oral Tablet; TAKE 1 TABLET 3 times daily; Therapy: 16JKH6937 to (Evaluate:10Mar2015)  Requested for: 46Gup8546; Last   Rx:08Avl7406 Ordered   8  Bystolic 10 MG Oral Tablet; take 1 tablet by mouth daily; Therapy: 01FRC5750 to (Evaluate:57Cei0718)  Requested for: 38WHQ1257; Last   Rx:15Mar2017 Ordered   9  Citalopram Hydrobromide 40 MG Oral Tablet; TAKE 1 TABLET BY MOUTH EVERY DAY; Therapy: 37LUC3527 to (Rupa Pont)  Requested for: 44CWQ3100; Last   Rx:26Nov2014 Ordered   10  Clopidogrel Bisulfate 75 MG Oral Tablet; take 1 tablet by mouth every day; Therapy: 10NYD2872 to (Evaluate:14Mdo4794)  Requested for: 20WAW1363; Last    Rx:51Pqm6532 Ordered   11  Dicyclomine HCl - 10 MG Oral Capsule; TAKE 1 CAPSULE EVERY 6 HOURS AS    NEEDED; Therapy: 47KXO7256 to (Evaluate:92Fvk9153)  Requested for: 89LRQ3759; Last    RV:62FXE2771 Ordered   12  Flomax 0 4 MG Oral Capsule; TAKE 1 CAPSULE Daily; Therapy: 51GDT0475 to Recorded   13  Gabapentin 800 MG Oral Tablet; Take 2 tablets daily as directed; Therapy: 64Iup5727 to Recorded   14  Jardiance 25 MG Oral Tablet; 1 Tab daily; Therapy: 82WIX8845 to (Evaluate:84Qxk4782); Last Rx:11Oct2017 Ordered   15   Memantine HCl - 10 MG Oral Tablet; one tab in am for 10 days then twice a day afterthat; Therapy: 30DDV1083 to (Last Rx:06Jun2017)  Requested for: 82Xuq3373 Ordered   16  MetOLazone 5 MG Oral Tablet; 1 tablet weekly; Therapy: 15Lwp9736 to Recorded   17  MS Contin 100 MG Oral Tablet Extended Release; 1 TABLET TWICE DAILY; Therapy: 65GLP4845 to Recorded   18  Nitrostat 0 4 MG Sublingual Tablet Sublingual; TAKE 1 TABLET SUBLINGUALLY AS    NEEDED; Therapy: 50KXJ5927 to (Evaluate:14May2017)  Requested for: 21SZI7699; Last    Rx:66Ncg6996 Ordered   19  NovoLOG FlexPen 100 UNIT/ML Subcutaneous Solution Pen-injector; 25 units with    breakfast and lunch and 30 units before dinner  Requested for: 13Hzq3437;    Last UN:48WCA2453 Ordered   20  Omeprazole 20 MG Oral Tablet Delayed Release; 1 PO BID; Therapy: 48RVF0898 to (Last Rx:03Lep5921)  Requested for: 74Qrk3628 Ordered   21  OxyCODONE HCl - 30 MG Oral Tablet; TAKE 1 TABLET EVERY 4 HOURS AS NEEDED    FOR PAIN;    Therapy: 62UIK3312 to  Requested for: 05JSF9669 Recorded   22  Potassium Chloride ER 20 MEQ Oral Tablet Extended Release; TAKE 2 TABLET 3 times    daily; Therapy: 42Oks3455 to Recorded   23  Qvar 80 MCG/ACT Inhalation Aerosol Solution; INHALE 1 PUFF TWICE DAILY; Therapy: 49RDP6183 to (Last Rx:26Mar2016)  Requested for: 56NWA6384 Ordered   24  Ranexa 1000 MG Oral Tablet Extended Release 12 Hour; take 1 tablet every 12 hours; Therapy: 07CGB0711 to (Arabella Lemus)  Requested for: 10AIH7529; Last    Rx:72Ioa2463 Ordered   25  Topiramate 100 MG Oral Tablet; TAKE 1 TABLET IN AM and Two at bedtime; Therapy: 31HLX0721 to (Arabella Lemus)  Requested for: 03BNI6343; Last    Rx:21Rgh2368 Ordered   26  Torsemide 100 MG Oral Tablet; 2 5 tablets daily in divided dosages; Therapy: 18Dcc6595 to (Evaluate:11Mar2018)  Requested for: 81Yvx6967; Last    Rx:82Zcj4736 Ordered   27  TraZODone HCl - 100 MG Oral Tablet; TAKE 1-3 TABLETS AT BEDTIME AS NEEDED FOR    DIFFICULTY SLEEPING;     Therapy: 43LXC6010 to (Evaluate:49Cgz0247)  Requested for: 02LFH6637; Last    Rx:26Nov2014 Ordered   28  Tenisha Arellano FlexTouch 200 UNIT/ML Subcutaneous Solution Pen-injector; 110 units daily at    bedtime; Therapy: 03PXQ4582 to (Evaluate:18Apr2018)  Requested for: 05CQE9782; Last    Rx:27Pmj6471 Ordered   34  Vitamin D3 5000 UNIT Oral Capsule; 1 Cap daily; Therapy: 36WNP1995 to (414 7713)  Requested for: 83GBT3307; Last    Rx:87Nkh4855 Ordered   30  Zolpidem Tartrate 10 MG Oral Tablet; TAKE 1 TABLET AT BEDTIME AS NEEDED FOR    SLEEP; Therapy: 08VKK2606 to (Evaluate:19Nov2014)  Requested for: 52DSQ5178; Last    GH:51VOG5043; Status: ACTIVE - Renewal Voided Ordered  Medication List Reviewed: The medication list was reviewed and updated today  Allergies  Medication    1  No Known Drug Allergies  Non-Medication    2  No Known Environmental Allergies   3  No Known Food Allergies    Vitals  Vital Signs    Recorded: 04YIE4803 12:07PM   Heart Rate 56, Apical   Respiration 16   Systolic 90, LUE   Diastolic 56, LUE   BP CUFF SIZE Large   Height 6 ft 2 in   Weight 372 lb 4 oz   BMI Calculated 47 79   BSA Calculated 2 83     Physical Exam    Constitutional   General appearance: Abnormal  -- obese middle aged white male  Eyes   Conjunctiva and Sclera examination: Conjunctiva pink, sclera anicteric  Ears, Nose, Mouth, and Throat - Oropharynx: Clear, nares are clear, mucous membranes are moist    Neck   Neck and thyroid: Normal, supple, trachea midline, no thyromegaly  Pulmonary   Auscultation of lungs: Abnormal  -- decreased breath sounds w/o wheezing rhonchi or rales  Cardiovascular   Auscultation of heart: Normal rate and rhythm, normal S1 and S2, no murmurs  Carotid pulses: Normal, 2+ bilaterally  Pedal pulses: Normal, 2+ bilaterally  Examination of extremities for edema and/or varicosities: Abnormal  -- 1-2+ edema of the LEs  Chest -   Sternum: Normal     Abdomen   Abdomen: Non-tender and no distention      Liver and spleen: No hepatomegaly or splenomegaly  Musculoskeletal Gait and station: Abnormal -- walker  -- Digits and nails: Normal without clubbing or cyanosis  -- Inspection/palpation of joints, bones, and muscles: Normal, ROM normal     Skin - Skin and subcutaneous tissue: Normal without rashes or lesions  Skin is warm and well perfused, normal turgor  Neurologic - Cranial nerves: II - XII intact  -- Sensation: No sensory loss  Psychiatric - Orientation to person, place, and time: Normal -- Mood and affect: Normal       Future Appointments    Date/Time Provider Specialty Site   11/09/2017 11:00 AM SALVADOR Valente  Endocrinology [facility] ENDOCRINOLOGY   01/16/2018 02:00 PM SALVADOR Valente   Endocrinology [facility] ENDOCRINOLOGY   11/09/2017 10:00 AM Endocrinology, Nurse Schedule  [facility] ENDOCRINOLOGY   01/22/2018 09:30 AM Ayush Cornelius MD Neurology  [address]   02/01/2018 12:30 PM Lashonda Schaeffer [facility] Neurology  [address] [facility]     Signatures   Electronically signed by : SALVADOR Fuentes ; Oct 31 2017 12:43PM EST                       (Author)

## 2017-11-02 ENCOUNTER — TELEPHONE (OUTPATIENT)
Dept: PREADMISSION TESTING | Facility: HOSPITAL | Age: 57
End: 2017-11-02

## 2017-11-02 ENCOUNTER — APPOINTMENT (OUTPATIENT)
Dept: LAB | Facility: HOSPITAL | Age: 57
DRG: 982 | End: 2017-11-02
Attending: INTERNAL MEDICINE
Payer: MEDICARE

## 2017-11-02 DIAGNOSIS — I50.32 CHRONIC DIASTOLIC HEART FAILURE (HCC): ICD-10-CM

## 2017-11-02 LAB
ANION GAP SERPL CALCULATED.3IONS-SCNC: 3 MMOL/L (ref 4–13)
BUN SERPL-MCNC: 33 MG/DL (ref 5–25)
CALCIUM SERPL-MCNC: 8.5 MG/DL (ref 8.3–10.1)
CHLORIDE SERPL-SCNC: 102 MMOL/L (ref 100–108)
CO2 SERPL-SCNC: 36 MMOL/L (ref 21–32)
CREAT SERPL-MCNC: 1.81 MG/DL (ref 0.6–1.3)
GFR SERPL CREATININE-BSD FRML MDRD: 41 ML/MIN/1.73SQ M
GLUCOSE SERPL-MCNC: 171 MG/DL (ref 65–140)
POTASSIUM SERPL-SCNC: 3.7 MMOL/L (ref 3.5–5.3)
SODIUM SERPL-SCNC: 141 MMOL/L (ref 136–145)

## 2017-11-02 PROCEDURE — 80048 BASIC METABOLIC PNL TOTAL CA: CPT

## 2017-11-02 PROCEDURE — 36415 COLL VENOUS BLD VENIPUNCTURE: CPT

## 2017-11-02 RX ORDER — SODIUM CHLORIDE 9 MG/ML
125 INJECTION, SOLUTION INTRAVENOUS ONCE
Status: CANCELLED | OUTPATIENT
Start: 2017-11-03 | End: 2017-11-03

## 2017-11-03 ENCOUNTER — HOSPITAL ENCOUNTER (INPATIENT)
Dept: NON INVASIVE DIAGNOSTICS | Facility: HOSPITAL | Age: 57
LOS: 2 days | Discharge: HOME | DRG: 982 | End: 2017-11-07
Attending: INTERNAL MEDICINE | Admitting: INTERNAL MEDICINE
Payer: MEDICARE

## 2017-11-03 ENCOUNTER — GENERIC CONVERSION - ENCOUNTER (OUTPATIENT)
Dept: OTHER | Facility: OTHER | Age: 57
End: 2017-11-03

## 2017-11-03 DIAGNOSIS — E87.6 HYPOKALEMIA: Primary | ICD-10-CM

## 2017-11-03 DIAGNOSIS — I20.9 ANGINA PECTORIS (HCC): ICD-10-CM

## 2017-11-03 DIAGNOSIS — N18.30 STAGE 3 CHRONIC KIDNEY DISEASE (HCC): ICD-10-CM

## 2017-11-03 DIAGNOSIS — I25.10 ATHEROSCLEROTIC HEART DISEASE OF NATIVE CORONARY ARTERY WITHOUT ANGINA PECTORIS: ICD-10-CM

## 2017-11-03 LAB
ATRIAL RATE: 63 BPM
ERYTHROCYTE [DISTWIDTH] IN BLOOD BY AUTOMATED COUNT: 17.1 % (ref 11.6–15.1)
GLUCOSE SERPL-MCNC: 116 MG/DL (ref 65–140)
GLUCOSE SERPL-MCNC: 118 MG/DL (ref 65–140)
GLUCOSE SERPL-MCNC: 181 MG/DL (ref 65–140)
HCT VFR BLD AUTO: 37.3 % (ref 36.5–49.3)
HGB BLD-MCNC: 11.5 G/DL (ref 12–17)
MCH RBC QN AUTO: 27.6 PG (ref 26.8–34.3)
MCHC RBC AUTO-ENTMCNC: 30.8 G/DL (ref 31.4–37.4)
MCV RBC AUTO: 89 FL (ref 82–98)
P AXIS: 21 DEGREES
PLATELET # BLD AUTO: 220 THOUSANDS/UL (ref 149–390)
PMV BLD AUTO: 10.5 FL (ref 8.9–12.7)
PR INTERVAL: 212 MS
QRS AXIS: -11 DEGREES
QRSD INTERVAL: 90 MS
QT INTERVAL: 446 MS
QTC INTERVAL: 456 MS
RBC # BLD AUTO: 4.17 MILLION/UL (ref 3.88–5.62)
T WAVE AXIS: 24 DEGREES
VENTRICULAR RATE: 63 BPM
WBC # BLD AUTO: 11.44 THOUSAND/UL (ref 4.31–10.16)

## 2017-11-03 PROCEDURE — C1887 CATHETER, GUIDING: HCPCS | Performed by: INTERNAL MEDICINE

## 2017-11-03 PROCEDURE — C1894 INTRO/SHEATH, NON-LASER: HCPCS | Performed by: INTERNAL MEDICINE

## 2017-11-03 PROCEDURE — 82948 REAGENT STRIP/BLOOD GLUCOSE: CPT

## 2017-11-03 PROCEDURE — 99152 MOD SED SAME PHYS/QHP 5/>YRS: CPT | Performed by: INTERNAL MEDICINE

## 2017-11-03 PROCEDURE — 93458 L HRT ARTERY/VENTRICLE ANGIO: CPT | Performed by: INTERNAL MEDICINE

## 2017-11-03 PROCEDURE — C1769 GUIDE WIRE: HCPCS | Performed by: INTERNAL MEDICINE

## 2017-11-03 PROCEDURE — B2111ZZ FLUOROSCOPY OF MULTIPLE CORONARY ARTERIES USING LOW OSMOLAR CONTRAST: ICD-10-PCS | Performed by: INTERNAL MEDICINE

## 2017-11-03 PROCEDURE — C9600 PERC DRUG-EL COR STENT SING: HCPCS | Performed by: INTERNAL MEDICINE

## 2017-11-03 PROCEDURE — 027034Z DILATION OF CORONARY ARTERY, ONE ARTERY WITH DRUG-ELUTING INTRALUMINAL DEVICE, PERCUTANEOUS APPROACH: ICD-10-PCS | Performed by: INTERNAL MEDICINE

## 2017-11-03 PROCEDURE — C1874 STENT, COATED/COV W/DEL SYS: HCPCS

## 2017-11-03 PROCEDURE — 85027 COMPLETE CBC AUTOMATED: CPT | Performed by: PHYSICIAN ASSISTANT

## 2017-11-03 PROCEDURE — 93005 ELECTROCARDIOGRAM TRACING: CPT

## 2017-11-03 PROCEDURE — 99153 MOD SED SAME PHYS/QHP EA: CPT | Performed by: INTERNAL MEDICINE

## 2017-11-03 PROCEDURE — 4A023N7 MEASUREMENT OF CARDIAC SAMPLING AND PRESSURE, LEFT HEART, PERCUTANEOUS APPROACH: ICD-10-PCS | Performed by: INTERNAL MEDICINE

## 2017-11-03 PROCEDURE — C1725 CATH, TRANSLUMIN NON-LASER: HCPCS | Performed by: INTERNAL MEDICINE

## 2017-11-03 RX ORDER — CLOPIDOGREL BISULFATE 75 MG/1
75 TABLET ORAL DAILY
Status: DISCONTINUED | OUTPATIENT
Start: 2017-11-04 | End: 2017-11-07 | Stop reason: HOSPADM

## 2017-11-03 RX ORDER — ASPIRIN 81 MG/1
81 TABLET, CHEWABLE ORAL DAILY
Status: DISCONTINUED | OUTPATIENT
Start: 2017-11-04 | End: 2017-11-07 | Stop reason: HOSPADM

## 2017-11-03 RX ORDER — BACLOFEN 10 MG/1
10 TABLET ORAL 3 TIMES DAILY
Status: DISCONTINUED | OUTPATIENT
Start: 2017-11-03 | End: 2017-11-07 | Stop reason: HOSPADM

## 2017-11-03 RX ORDER — HEPARIN SODIUM 1000 [USP'U]/ML
INJECTION, SOLUTION INTRAVENOUS; SUBCUTANEOUS CODE/TRAUMA/SEDATION MEDICATION
Status: COMPLETED | OUTPATIENT
Start: 2017-11-03 | End: 2017-11-03

## 2017-11-03 RX ORDER — ZOLPIDEM TARTRATE 5 MG/1
10 TABLET ORAL
Status: DISCONTINUED | OUTPATIENT
Start: 2017-11-03 | End: 2017-11-07 | Stop reason: HOSPADM

## 2017-11-03 RX ORDER — MELATONIN
2000 DAILY
Status: DISCONTINUED | OUTPATIENT
Start: 2017-11-03 | End: 2017-11-07 | Stop reason: HOSPADM

## 2017-11-03 RX ORDER — MEMANTINE HYDROCHLORIDE 5 MG/1
10 TABLET ORAL DAILY
Status: DISCONTINUED | OUTPATIENT
Start: 2017-11-03 | End: 2017-11-07 | Stop reason: HOSPADM

## 2017-11-03 RX ORDER — MIDAZOLAM HYDROCHLORIDE 1 MG/ML
INJECTION INTRAMUSCULAR; INTRAVENOUS CODE/TRAUMA/SEDATION MEDICATION
Status: COMPLETED | OUTPATIENT
Start: 2017-11-03 | End: 2017-11-03

## 2017-11-03 RX ORDER — VERAPAMIL HYDROCHLORIDE 2.5 MG/ML
INJECTION, SOLUTION INTRAVENOUS CODE/TRAUMA/SEDATION MEDICATION
Status: COMPLETED | OUTPATIENT
Start: 2017-11-03 | End: 2017-11-03

## 2017-11-03 RX ORDER — SODIUM CHLORIDE 9 MG/ML
50 INJECTION, SOLUTION INTRAVENOUS CONTINUOUS
Status: DISCONTINUED | OUTPATIENT
Start: 2017-11-03 | End: 2017-11-06

## 2017-11-03 RX ORDER — ATORVASTATIN CALCIUM 40 MG/1
40 TABLET, FILM COATED ORAL
Status: DISCONTINUED | OUTPATIENT
Start: 2017-11-03 | End: 2017-11-07 | Stop reason: HOSPADM

## 2017-11-03 RX ORDER — ASPIRIN 81 MG/1
TABLET, CHEWABLE ORAL CODE/TRAUMA/SEDATION MEDICATION
Status: COMPLETED | OUTPATIENT
Start: 2017-11-03 | End: 2017-11-03

## 2017-11-03 RX ORDER — FENTANYL CITRATE 50 UG/ML
INJECTION, SOLUTION INTRAMUSCULAR; INTRAVENOUS CODE/TRAUMA/SEDATION MEDICATION
Status: COMPLETED | OUTPATIENT
Start: 2017-11-03 | End: 2017-11-03

## 2017-11-03 RX ORDER — TAMSULOSIN HYDROCHLORIDE 0.4 MG/1
0.4 CAPSULE ORAL
Status: DISCONTINUED | OUTPATIENT
Start: 2017-11-03 | End: 2017-11-07 | Stop reason: HOSPADM

## 2017-11-03 RX ORDER — TIZANIDINE 2 MG/1
2 TABLET ORAL 3 TIMES DAILY
Status: DISCONTINUED | OUTPATIENT
Start: 2017-11-03 | End: 2017-11-07 | Stop reason: HOSPADM

## 2017-11-03 RX ORDER — POLYETHYLENE GLYCOL 3350 17 G/17G
17 POWDER, FOR SOLUTION ORAL DAILY
Status: DISCONTINUED | OUTPATIENT
Start: 2017-11-03 | End: 2017-11-07 | Stop reason: HOSPADM

## 2017-11-03 RX ORDER — NEBIVOLOL 10 MG/1
10 TABLET ORAL DAILY
Status: DISCONTINUED | OUTPATIENT
Start: 2017-11-03 | End: 2017-11-07 | Stop reason: HOSPADM

## 2017-11-03 RX ORDER — TORSEMIDE 100 MG/1
100 TABLET ORAL 2 TIMES DAILY
Status: DISCONTINUED | OUTPATIENT
Start: 2017-11-03 | End: 2017-11-04

## 2017-11-03 RX ORDER — ISOSORBIDE MONONITRATE 30 MG/1
30 TABLET, EXTENDED RELEASE ORAL DAILY
Status: DISCONTINUED | OUTPATIENT
Start: 2017-11-03 | End: 2017-11-07 | Stop reason: HOSPADM

## 2017-11-03 RX ORDER — ALBUTEROL SULFATE 90 UG/1
2 AEROSOL, METERED RESPIRATORY (INHALATION) EVERY 6 HOURS PRN
Status: DISCONTINUED | OUTPATIENT
Start: 2017-11-03 | End: 2017-11-07 | Stop reason: HOSPADM

## 2017-11-03 RX ORDER — LOSARTAN POTASSIUM 25 MG/1
25 TABLET ORAL DAILY
Status: DISCONTINUED | OUTPATIENT
Start: 2017-11-03 | End: 2017-11-04

## 2017-11-03 RX ORDER — POTASSIUM CHLORIDE 20 MEQ/1
20 TABLET, EXTENDED RELEASE ORAL 3 TIMES DAILY
Status: DISCONTINUED | OUTPATIENT
Start: 2017-11-03 | End: 2017-11-04

## 2017-11-03 RX ORDER — METOLAZONE 5 MG/1
5 TABLET ORAL WEEKLY
Status: DISCONTINUED | OUTPATIENT
Start: 2017-11-03 | End: 2017-11-04

## 2017-11-03 RX ORDER — NITROGLYCERIN 0.4 MG/1
0.4 TABLET SUBLINGUAL
Status: DISCONTINUED | OUTPATIENT
Start: 2017-11-03 | End: 2017-11-07 | Stop reason: HOSPADM

## 2017-11-03 RX ORDER — TRAZODONE HYDROCHLORIDE 100 MG/1
100 TABLET ORAL
Status: DISCONTINUED | OUTPATIENT
Start: 2017-11-03 | End: 2017-11-07 | Stop reason: HOSPADM

## 2017-11-03 RX ORDER — OXYCODONE HYDROCHLORIDE 10 MG/1
30 TABLET ORAL EVERY 4 HOURS PRN
Status: DISCONTINUED | OUTPATIENT
Start: 2017-11-03 | End: 2017-11-07 | Stop reason: HOSPADM

## 2017-11-03 RX ORDER — NITROGLYCERIN 20 MG/100ML
INJECTION INTRAVENOUS CODE/TRAUMA/SEDATION MEDICATION
Status: COMPLETED | OUTPATIENT
Start: 2017-11-03 | End: 2017-11-03

## 2017-11-03 RX ORDER — TOPIRAMATE 25 MG/1
100 TABLET ORAL 2 TIMES DAILY
Status: DISCONTINUED | OUTPATIENT
Start: 2017-11-03 | End: 2017-11-07 | Stop reason: HOSPADM

## 2017-11-03 RX ORDER — SPIRONOLACTONE 25 MG/1
25 TABLET ORAL DAILY
Status: DISCONTINUED | OUTPATIENT
Start: 2017-11-03 | End: 2017-11-04

## 2017-11-03 RX ORDER — GABAPENTIN 400 MG/1
800 CAPSULE ORAL 2 TIMES DAILY
Status: DISCONTINUED | OUTPATIENT
Start: 2017-11-03 | End: 2017-11-07 | Stop reason: HOSPADM

## 2017-11-03 RX ORDER — PANTOPRAZOLE SODIUM 20 MG/1
20 TABLET, DELAYED RELEASE ORAL
Status: DISCONTINUED | OUTPATIENT
Start: 2017-11-03 | End: 2017-11-07 | Stop reason: HOSPADM

## 2017-11-03 RX ORDER — SODIUM CHLORIDE 9 MG/ML
125 INJECTION, SOLUTION INTRAVENOUS ONCE
Status: COMPLETED | OUTPATIENT
Start: 2017-11-03 | End: 2017-11-03

## 2017-11-03 RX ORDER — RANOLAZINE 500 MG/1
1000 TABLET, EXTENDED RELEASE ORAL 2 TIMES DAILY
Status: DISCONTINUED | OUTPATIENT
Start: 2017-11-03 | End: 2017-11-07 | Stop reason: HOSPADM

## 2017-11-03 RX ORDER — CLOPIDOGREL BISULFATE 75 MG/1
TABLET ORAL CODE/TRAUMA/SEDATION MEDICATION
Status: COMPLETED | OUTPATIENT
Start: 2017-11-03 | End: 2017-11-03

## 2017-11-03 RX ADMIN — LOSARTAN POTASSIUM 25 MG: 25 TABLET, FILM COATED ORAL at 13:27

## 2017-11-03 RX ADMIN — HEPARIN SODIUM 6000 UNITS: 1000 INJECTION INTRAVENOUS; SUBCUTANEOUS at 09:35

## 2017-11-03 RX ADMIN — TIZANIDINE 2 MG: 2 TABLET ORAL at 13:28

## 2017-11-03 RX ADMIN — FLUTICASONE PROPIONATE AND SALMETEROL 1 PUFF: 50; 250 POWDER RESPIRATORY (INHALATION) at 13:29

## 2017-11-03 RX ADMIN — ZOLPIDEM TARTRATE 10 MG: 5 TABLET ORAL at 22:29

## 2017-11-03 RX ADMIN — BACLOFEN 10 MG: 10 TABLET ORAL at 13:28

## 2017-11-03 RX ADMIN — OXYCODONE HYDROCHLORIDE 30 MG: 10 TABLET ORAL at 13:27

## 2017-11-03 RX ADMIN — GABAPENTIN 800 MG: 400 CAPSULE ORAL at 17:06

## 2017-11-03 RX ADMIN — SODIUM CHLORIDE 125 ML/HR: 0.9 INJECTION, SOLUTION INTRAVENOUS at 07:15

## 2017-11-03 RX ADMIN — POLYETHYLENE GLYCOL (3350) 17 G: 17 POWDER, FOR SOLUTION ORAL at 17:04

## 2017-11-03 RX ADMIN — METOLAZONE 5 MG: 5 TABLET ORAL at 13:27

## 2017-11-03 RX ADMIN — FENTANYL CITRATE 50 MCG: 50 INJECTION, SOLUTION INTRAMUSCULAR; INTRAVENOUS at 09:18

## 2017-11-03 RX ADMIN — VERAPAMIL HYDROCHLORIDE 2.5 MG: 2.5 INJECTION, SOLUTION INTRAVENOUS at 09:32

## 2017-11-03 RX ADMIN — ISOSORBIDE MONONITRATE 30 MG: 30 TABLET, EXTENDED RELEASE ORAL at 13:27

## 2017-11-03 RX ADMIN — VITAMIN D, TAB 1000IU (100/BT) 2000 UNITS: 25 TAB at 13:27

## 2017-11-03 RX ADMIN — NITROGLYCERIN 200 MCG: 20 INJECTION INTRAVENOUS at 09:33

## 2017-11-03 RX ADMIN — POTASSIUM CHLORIDE 20 MEQ: 1500 TABLET, EXTENDED RELEASE ORAL at 21:52

## 2017-11-03 RX ADMIN — INSULIN LISPRO 25 UNITS: 100 INJECTION, SOLUTION INTRAVENOUS; SUBCUTANEOUS at 17:09

## 2017-11-03 RX ADMIN — IOHEXOL 70 ML: 350 INJECTION, SOLUTION INTRAVENOUS at 09:44

## 2017-11-03 RX ADMIN — BACLOFEN 10 MG: 10 TABLET ORAL at 21:51

## 2017-11-03 RX ADMIN — ATORVASTATIN CALCIUM 40 MG: 40 TABLET, FILM COATED ORAL at 21:51

## 2017-11-03 RX ADMIN — PANTOPRAZOLE SODIUM 20 MG: 20 TABLET, DELAYED RELEASE ORAL at 17:11

## 2017-11-03 RX ADMIN — MEMANTINE 10 MG: 5 TABLET ORAL at 13:29

## 2017-11-03 RX ADMIN — FENTANYL CITRATE 50 MCG: 50 INJECTION, SOLUTION INTRAMUSCULAR; INTRAVENOUS at 09:32

## 2017-11-03 RX ADMIN — TORSEMIDE 100 MG: 100 TABLET ORAL at 17:06

## 2017-11-03 RX ADMIN — TRAZODONE HYDROCHLORIDE 100 MG: 100 TABLET ORAL at 21:51

## 2017-11-03 RX ADMIN — OXYCODONE HYDROCHLORIDE 30 MG: 10 TABLET ORAL at 21:55

## 2017-11-03 RX ADMIN — INSULIN LISPRO 25 UNITS: 100 INJECTION, SOLUTION INTRAVENOUS; SUBCUTANEOUS at 13:35

## 2017-11-03 RX ADMIN — FLUTICASONE PROPIONATE AND SALMETEROL 1 PUFF: 50; 250 POWDER RESPIRATORY (INHALATION) at 22:29

## 2017-11-03 RX ADMIN — TIZANIDINE 2 MG: 2 TABLET ORAL at 21:57

## 2017-11-03 RX ADMIN — TAMSULOSIN HYDROCHLORIDE 0.4 MG: 0.4 CAPSULE ORAL at 17:07

## 2017-11-03 RX ADMIN — POTASSIUM CHLORIDE 20 MEQ: 1500 TABLET, EXTENDED RELEASE ORAL at 13:28

## 2017-11-03 RX ADMIN — SPIRONOLACTONE 25 MG: 25 TABLET, FILM COATED ORAL at 13:28

## 2017-11-03 RX ADMIN — NEBIVOLOL HYDROCHLORIDE 10 MG: 10 TABLET ORAL at 13:28

## 2017-11-03 RX ADMIN — ASPIRIN 81 MG 324 MG: 81 TABLET ORAL at 08:30

## 2017-11-03 RX ADMIN — MIDAZOLAM 2 MG: 1 INJECTION INTRAMUSCULAR; INTRAVENOUS at 09:18

## 2017-11-03 RX ADMIN — INSULIN LISPRO 2 UNITS: 100 INJECTION, SOLUTION INTRAVENOUS; SUBCUTANEOUS at 17:08

## 2017-11-03 RX ADMIN — CLOPIDOGREL 75 MG: 75 TABLET, FILM COATED ORAL at 08:30

## 2017-11-03 RX ADMIN — HEPARIN SODIUM 4000 UNITS: 1000 INJECTION INTRAVENOUS; SUBCUTANEOUS at 09:33

## 2017-11-03 RX ADMIN — OXYCODONE HYDROCHLORIDE 30 MG: 10 TABLET ORAL at 17:21

## 2017-11-03 RX ADMIN — LUBIPROSTONE 24 MCG: 24 CAPSULE, GELATIN COATED ORAL at 17:10

## 2017-11-03 RX ADMIN — MIDAZOLAM 1 MG: 1 INJECTION INTRAMUSCULAR; INTRAVENOUS at 09:32

## 2017-11-03 RX ADMIN — TOPIRAMATE 100 MG: 25 TABLET, FILM COATED ORAL at 13:27

## 2017-11-03 RX ADMIN — SODIUM CHLORIDE 100 ML/HR: 0.9 INJECTION, SOLUTION INTRAVENOUS at 10:18

## 2017-11-03 RX ADMIN — RANOLAZINE 1000 MG: 500 TABLET, FILM COATED, EXTENDED RELEASE ORAL at 13:27

## 2017-11-03 NOTE — BRIEF OP NOTE (RAD/CATH)
8F right radial/hemoband    LMCA: nonobstructive    LAD: known 90% diffuse disease at apex    LCX: ostial/proximal stent patent    RCA: proximal RCA with 85% lesion to 0% with 3 5 x 15mm Resolute OMER    Imp: 1) noncardiac constant chest pain 2) coincidental 85% proximal RCA successfully treated with OMER    Plan) dapt, statin, bp control, weight loss

## 2017-11-03 NOTE — DISCHARGE INSTRUCTIONS
After Heart Catheterization   AMBULATORY CARE:   Call 911 for any of the following:   · You have any of the following signs of a heart attack:      ¨ Squeezing, pressure, or pain in your chest that lasts longer than 5 minutes or returns    ¨ Discomfort or pain in your back, neck, jaw, stomach, or arm     ¨ Trouble breathing    ¨ Nausea or vomiting    ¨ Lightheadedness or a sudden cold sweat, especially with chest pain or trouble breathing    · You have any of the following signs of a stroke:      ¨ Numbness or drooping on one side of your face     ¨ Weakness in an arm or leg    ¨ Confusion or difficulty speaking    ¨ Dizziness, a severe headache, or vision loss    · You feel lightheaded, short of breath, and have chest pain  · You cough up blood  · You have trouble breathing  · You cannot stop the bleeding from your wound even after you hold firm pressure for 10 minutes  Seek care immediately if:   · Blood soaks through your bandage  · Your stitches come apart  · Your arm or leg feels numb, cool, or looks pale  · Your wound gets swollen quickly  Contact your healthcare provider if:   · You have a fever or chills  · Your wound is red, swollen, or draining pus  · Your wound looks more bruised or you have new bruising on the side of your leg or arm  · You have nausea or are vomiting  · Your skin is itchy, swollen, or you have a rash  · You have questions or concerns about your condition or care  Medicines: You may need any of the following:  · Blood thinners    help prevent blood clots  Examples of blood thinners include heparin and warfarin  Clots can cause strokes, heart attacks, and death  The following are general safety guidelines to follow while you are taking a blood thinner:    ¨ Watch for bleeding and bruising while you take blood thinners  Watch for bleeding from your gums or nose  Watch for blood in your urine and bowel movements   Use a soft washcloth on your skin, and a soft toothbrush to brush your teeth  This can keep your skin and gums from bleeding  If you shave, use an electric shaver  Do not play contact sports  ¨ Tell your dentist and other healthcare providers that you take anticoagulants  Wear a bracelet or necklace that says you take this medicine  ¨ Do not start or stop any medicines unless your healthcare provider tells you to  Many medicines cannot be used with blood thinners  ¨ Tell your healthcare provider right away if you forget to take the medicine, or if you take too much  ¨ Warfarin  is a blood thinner that you may need to take  The following are things you should be aware of if you take warfarin  § Foods and medicines can affect the amount of warfarin in your blood  Do not make major changes to your diet while you take warfarin  Warfarin works best when you eat about the same amount of vitamin K every day  Vitamin K is found in green leafy vegetables and certain other foods  Ask for more information about what to eat when you are taking warfarin  § You will need to see your healthcare provider for follow-up visits when you are on warfarin  You will need regular blood tests  These tests are used to decide how much medicine you need  · Acetaminophen  helps decrease pain and fever  This medicine is available without a doctor's order  Ask how much medicine is safe to take, and how often to take it  Acetaminophen can cause liver damage if not taken correctly  · Take your medicine as directed  Contact your healthcare provider if you think your medicine is not helping or if you have side effects  Tell him or her if you are allergic to any medicine  Keep a list of the medicines, vitamins, and herbs you take  Include the amounts, and when and why you take them  Bring the list or the pill bottles to follow-up visits  Carry your medicine list with you in case of an emergency  Bathing:   You may be able to shower the day after your procedure  Remove your pressure bandage before you shower  Do not take baths or go in hot tubs or pools  Carefully wash the wound with soap and water  Pat the area dry  Care for your wound as directed:  Change your bandage when it gets wet or dirty  A small bandage can be placed on your wound after you remove the pressure bandage  Do not put powders, lotions, or creams on your wound  They may cause your wound to get infected  Monitor your wound every day for signs of infection, such as redness, swelling, or pus  Mild bruising is normal and expected  If bleeding from your wound occurs:  Apply firm, steady pressure to stop the bleeding  Apply pressure with a clean gauze or towel for 5 to 10 minutes  Call 911 if bleeding becomes heavy or does not stop  Activity:  Do not lift anything heavier than 5 pounds until directed by your healthcare provider  Heavy lifting can put stress on your wound and cause bleeding  Do not push or pull with the arm that was used for the procedure  Do not do vigorous activity for at least 48 hours  Vigorous activity may cause bleeding from your wound  Rest and do quiet activities  Short walks to the bathroom and around the house are okay  Limit your stair climbing to prevent bleeding  Ask your healthcare provider when you can return to your normal activities  Do not strain when you have a bowel movement:  Your wound may bleed if you strain to have a bowel movement  Keep your legs flat on the floor and your hips at a 90° angle  Talk to your healthcare provider if you are constipated  You may need medicine to make it easier for you to have a bowel movement and to prevent straining  Drink liquids as directed:  Liquids will help flush the contrast liquid from your body  Ask how much liquid to drink each day and which liquids are best for you  Driving:  Ask your healthcare provider when it is okay for you to drive   He may tell you to wait 48 hours before you drive to decrease your risk for bleeding  Returning to work: You may not be able to return to work for at least 2 days after your procedure if your job involves heavy lifting  Ask your healthcare provider when it is okay for you to return to work  © 2017 [address] [employer] Information is for End User's use only and may not be sold, redistributed or otherwise used for commercial purposes  All illustrations and images included in CareNotes® are the copyrighted property of [employer]  or [employer]audh[employer]  The above information is an  only  It is not intended as medical advice for individual conditions or treatments  Talk to your doctor, nurse or pharmacist before following any medical regimen to see if it is safe and effective for you

## 2017-11-04 ENCOUNTER — GENERIC CONVERSION - ENCOUNTER (OUTPATIENT)
Dept: OTHER | Facility: OTHER | Age: 57
End: 2017-11-04

## 2017-11-04 LAB
ANION GAP SERPL CALCULATED.3IONS-SCNC: 7 MMOL/L (ref 4–13)
BUN SERPL-MCNC: 27 MG/DL (ref 5–25)
CALCIUM SERPL-MCNC: 8.8 MG/DL (ref 8.3–10.1)
CHLORIDE SERPL-SCNC: 97 MMOL/L (ref 100–108)
CHOLEST SERPL-MCNC: 110 MG/DL (ref 50–200)
CO2 SERPL-SCNC: 33 MMOL/L (ref 21–32)
CREAT SERPL-MCNC: 1.78 MG/DL (ref 0.6–1.3)
ERYTHROCYTE [DISTWIDTH] IN BLOOD BY AUTOMATED COUNT: 16.8 % (ref 11.6–15.1)
GFR SERPL CREATININE-BSD FRML MDRD: 42 ML/MIN/1.73SQ M
GLUCOSE P FAST SERPL-MCNC: 122 MG/DL (ref 65–99)
GLUCOSE SERPL-MCNC: 119 MG/DL (ref 65–140)
GLUCOSE SERPL-MCNC: 122 MG/DL (ref 65–140)
GLUCOSE SERPL-MCNC: 165 MG/DL (ref 65–140)
GLUCOSE SERPL-MCNC: 230 MG/DL (ref 65–140)
GLUCOSE SERPL-MCNC: 247 MG/DL (ref 65–140)
HCT VFR BLD AUTO: 39.5 % (ref 36.5–49.3)
HDLC SERPL-MCNC: 27 MG/DL (ref 40–60)
HGB BLD-MCNC: 12.5 G/DL (ref 12–17)
LDLC SERPL CALC-MCNC: 48 MG/DL (ref 0–100)
MCH RBC QN AUTO: 28 PG (ref 26.8–34.3)
MCHC RBC AUTO-ENTMCNC: 31.6 G/DL (ref 31.4–37.4)
MCV RBC AUTO: 88 FL (ref 82–98)
PLATELET # BLD AUTO: 225 THOUSANDS/UL (ref 149–390)
PMV BLD AUTO: 10.4 FL (ref 8.9–12.7)
POTASSIUM SERPL-SCNC: 2.6 MMOL/L (ref 3.5–5.3)
RBC # BLD AUTO: 4.47 MILLION/UL (ref 3.88–5.62)
SODIUM SERPL-SCNC: 137 MMOL/L (ref 136–145)
TRIGL SERPL-MCNC: 177 MG/DL
WBC # BLD AUTO: 14.45 THOUSAND/UL (ref 4.31–10.16)

## 2017-11-04 PROCEDURE — 80061 LIPID PANEL: CPT | Performed by: INTERNAL MEDICINE

## 2017-11-04 PROCEDURE — 82948 REAGENT STRIP/BLOOD GLUCOSE: CPT

## 2017-11-04 PROCEDURE — 80048 BASIC METABOLIC PNL TOTAL CA: CPT | Performed by: INTERNAL MEDICINE

## 2017-11-04 PROCEDURE — 85027 COMPLETE CBC AUTOMATED: CPT | Performed by: INTERNAL MEDICINE

## 2017-11-04 RX ORDER — ALBUMIN, HUMAN INJ 5% 5 %
12.5 SOLUTION INTRAVENOUS EVERY 6 HOURS
Status: DISCONTINUED | OUTPATIENT
Start: 2017-11-04 | End: 2017-11-04

## 2017-11-04 RX ORDER — POTASSIUM CHLORIDE 20 MEQ/1
40 TABLET, EXTENDED RELEASE ORAL 3 TIMES DAILY
Status: DISCONTINUED | OUTPATIENT
Start: 2017-11-04 | End: 2017-11-07 | Stop reason: HOSPADM

## 2017-11-04 RX ADMIN — LUBIPROSTONE 24 MCG: 24 CAPSULE, GELATIN COATED ORAL at 17:12

## 2017-11-04 RX ADMIN — GABAPENTIN 800 MG: 400 CAPSULE ORAL at 17:13

## 2017-11-04 RX ADMIN — TORSEMIDE 100 MG: 100 TABLET ORAL at 17:13

## 2017-11-04 RX ADMIN — TRAZODONE HYDROCHLORIDE 100 MG: 100 TABLET ORAL at 21:54

## 2017-11-04 RX ADMIN — TIZANIDINE 2 MG: 2 TABLET ORAL at 17:12

## 2017-11-04 RX ADMIN — RANOLAZINE 1000 MG: 500 TABLET, FILM COATED, EXTENDED RELEASE ORAL at 09:04

## 2017-11-04 RX ADMIN — OXYCODONE HYDROCHLORIDE 30 MG: 10 TABLET ORAL at 21:56

## 2017-11-04 RX ADMIN — OXYCODONE HYDROCHLORIDE 30 MG: 10 TABLET ORAL at 17:20

## 2017-11-04 RX ADMIN — BACLOFEN 10 MG: 10 TABLET ORAL at 17:12

## 2017-11-04 RX ADMIN — GABAPENTIN 800 MG: 400 CAPSULE ORAL at 09:04

## 2017-11-04 RX ADMIN — TIZANIDINE 2 MG: 2 TABLET ORAL at 21:56

## 2017-11-04 RX ADMIN — ALBUMIN HUMAN 12.5 G: 0.05 INJECTION, SOLUTION INTRAVENOUS at 17:14

## 2017-11-04 RX ADMIN — NEBIVOLOL HYDROCHLORIDE 10 MG: 10 TABLET ORAL at 09:05

## 2017-11-04 RX ADMIN — POTASSIUM CHLORIDE 20 MEQ: 1500 TABLET, EXTENDED RELEASE ORAL at 09:05

## 2017-11-04 RX ADMIN — LOSARTAN POTASSIUM 25 MG: 25 TABLET, FILM COATED ORAL at 09:05

## 2017-11-04 RX ADMIN — TORSEMIDE 100 MG: 100 TABLET ORAL at 09:05

## 2017-11-04 RX ADMIN — RANOLAZINE 1000 MG: 500 TABLET, FILM COATED, EXTENDED RELEASE ORAL at 17:13

## 2017-11-04 RX ADMIN — ISOSORBIDE MONONITRATE 30 MG: 30 TABLET, EXTENDED RELEASE ORAL at 09:05

## 2017-11-04 RX ADMIN — TAMSULOSIN HYDROCHLORIDE 0.4 MG: 0.4 CAPSULE ORAL at 17:12

## 2017-11-04 RX ADMIN — INSULIN LISPRO 25 UNITS: 100 INJECTION, SOLUTION INTRAVENOUS; SUBCUTANEOUS at 12:08

## 2017-11-04 RX ADMIN — POTASSIUM CHLORIDE 40 MEQ: 1500 TABLET, EXTENDED RELEASE ORAL at 17:13

## 2017-11-04 RX ADMIN — TOPIRAMATE 100 MG: 25 TABLET, FILM COATED ORAL at 17:13

## 2017-11-04 RX ADMIN — ZOLPIDEM TARTRATE 10 MG: 5 TABLET ORAL at 21:54

## 2017-11-04 RX ADMIN — PANTOPRAZOLE SODIUM 20 MG: 20 TABLET, DELAYED RELEASE ORAL at 17:12

## 2017-11-04 RX ADMIN — ASPIRIN 81 MG 81 MG: 81 TABLET ORAL at 09:04

## 2017-11-04 RX ADMIN — CLOPIDOGREL BISULFATE 75 MG: 75 TABLET ORAL at 09:04

## 2017-11-04 RX ADMIN — BACLOFEN 10 MG: 10 TABLET ORAL at 09:04

## 2017-11-04 RX ADMIN — PANTOPRAZOLE SODIUM 20 MG: 20 TABLET, DELAYED RELEASE ORAL at 09:04

## 2017-11-04 RX ADMIN — FLUTICASONE PROPIONATE AND SALMETEROL 1 PUFF: 50; 250 POWDER RESPIRATORY (INHALATION) at 17:12

## 2017-11-04 RX ADMIN — INSULIN LISPRO 2 UNITS: 100 INJECTION, SOLUTION INTRAVENOUS; SUBCUTANEOUS at 12:08

## 2017-11-04 RX ADMIN — SPIRONOLACTONE 25 MG: 25 TABLET, FILM COATED ORAL at 09:05

## 2017-11-04 RX ADMIN — TIZANIDINE 2 MG: 2 TABLET ORAL at 09:05

## 2017-11-04 RX ADMIN — INSULIN LISPRO 25 UNITS: 100 INJECTION, SOLUTION INTRAVENOUS; SUBCUTANEOUS at 17:14

## 2017-11-04 RX ADMIN — INSULIN LISPRO 4 UNITS: 100 INJECTION, SOLUTION INTRAVENOUS; SUBCUTANEOUS at 17:14

## 2017-11-04 RX ADMIN — MEMANTINE 10 MG: 5 TABLET ORAL at 09:05

## 2017-11-04 RX ADMIN — TOPIRAMATE 100 MG: 25 TABLET, FILM COATED ORAL at 09:08

## 2017-11-04 RX ADMIN — ATORVASTATIN CALCIUM 40 MG: 40 TABLET, FILM COATED ORAL at 21:54

## 2017-11-04 RX ADMIN — FLUTICASONE PROPIONATE AND SALMETEROL 1 PUFF: 50; 250 POWDER RESPIRATORY (INHALATION) at 09:04

## 2017-11-04 RX ADMIN — OXYCODONE HYDROCHLORIDE 30 MG: 10 TABLET ORAL at 09:05

## 2017-11-04 RX ADMIN — BACLOFEN 10 MG: 10 TABLET ORAL at 21:53

## 2017-11-04 RX ADMIN — POTASSIUM CHLORIDE 40 MEQ: 1500 TABLET, EXTENDED RELEASE ORAL at 21:53

## 2017-11-04 RX ADMIN — VITAMIN D, TAB 1000IU (100/BT) 2000 UNITS: 25 TAB at 09:04

## 2017-11-04 RX ADMIN — LUBIPROSTONE 24 MCG: 24 CAPSULE, GELATIN COATED ORAL at 09:05

## 2017-11-04 RX ADMIN — POLYETHYLENE GLYCOL (3350) 17 G: 17 POWDER, FOR SOLUTION ORAL at 09:05

## 2017-11-04 RX ADMIN — INSULIN LISPRO 25 UNITS: 100 INJECTION, SOLUTION INTRAVENOUS; SUBCUTANEOUS at 09:04

## 2017-11-04 NOTE — MALNUTRITION/BMI
This medical record reflects one or more clinical indicators suggestive of malnutrition and/or morbid obesity  Morbid obesity    BMI Findings:  45-49 9    Body mass index is 47 76 kg/m²  See Nutrition note dated 11/4/17 for additional details  Completed nutrition assessment is viewable in the nutrition documentation

## 2017-11-04 NOTE — CONSULTS
Inpatient Medical Consultation - [facility] Internal Medicine    Patient Information: Elfego Venegas 49 y o  male MRN: [id number]  Unit/Bed#: E4 -01 Encounter: [id number]  PCP: Nilsa Fields MD  Date of Admission:  11/3/2017  Date of Consultation: 11/04/17  Requesting Physician: Simón Stoddard MD    Reason For Consultation:   Hypokalemia and LENNY    Assessment/Plan:    · Hypokalemia:  Potassium of 2 6  Placed on potassium supplementation with 40 mEq 3 times daily  Will monitor on telemetry for any arrhythmias  · LENNY on CKD stage 3:  Creatinine 1 78  Baseline appearing to be 1 2-1 4  · Chronic diastolic heart failure:  Continue beta-blocker  On spirolactone 25 mg daily, torsemide 100 mg twice daily and Zaroxolyn 5 mg once a week  Diuretics held in the setting of LENNY and recent cath with need for gentle IV fluids  -current weight 372 lbs    -appears baseline dry weight ranges 366-371    · Type 2 diabetes:  A1c 6 9  Home regimen of Humalog 25 units before meals and degludec 110 units before bed  · CAD with prior cardiac stenting  Underwent further OMER here to RCA  Continue beta-blocker, statin, aspirin, Plavix, Ranexa  · Essential hypertension:  Continue home regimen of Imdur, losartan, Bystolic  · Hyperlipidemia:  Lipid panel from today reveals cholesterol 110, triglycerides 177, HDL 27, LDL 48  Continue statin    · ARLEEN:  Compliant with CPAP    · Migraine prophylaxis:  On Topamax and receives injections by Neurology    · GERD:  Continue PPI      VTE Prophylaxis: Sequential compression device (Venodyne)   / sequential compression device       Counseling / Coordination of Care Time: 30 minutes  Greater than 50% of total time spent on patient counseling and coordination of care        History of Present Illness:    Elfego Venegas is a 49 y o  male who is originally admitted to the Cardiology service on 11/3/2017 for a scheduled cardiac catheterization in the setting of ongoing chest discomfort x 3 weeks  Follows with Dr Perera Pac as an outpatient  We are consulted for medical management given patient with hypokalemia- potassium of 2 6 and mild LENNY in the setting of recent catheterization  Patient is doing well status post cardiac catheterization reports no chest symptoms since cath  Currently denies any shortness of breath, chest pain or pressure, palpitations, abdominal pain, dysuria, diarrhea, or constipation  Review of Systems:    Review of Systems   Constitutional: Negative for chills, fatigue and fever  HENT: Negative for congestion  Respiratory: Negative for cough, chest tightness and shortness of breath  Cardiovascular: Negative for chest pain, palpitations and leg swelling  Gastrointestinal: Negative for abdominal pain, diarrhea, nausea and vomiting  Genitourinary: Negative for dysuria  Neurological: Negative for light-headedness and headaches  Psychiatric/Behavioral: Negative for agitation         Past Medical and Surgical History:     Past Medical History:   Diagnosis Date    Acute on chronic diastolic congestive heart failure (HCC)     Altered gait     Alzheimer disease     per patients,,early onset     Angina pectoris ([facility] [address] )     Anxiety     Arthritis     Brain aneurysm     Cardiac disease     Chest pain 1/13/2016    Chronic pain     back/ right groin and rle- has morphine pump    Constipation     COPD (chronic obstructive pulmonary disease) (HCC)     Coronary artery disease     CPAP (continuous positive airway pressure) dependence     Dependent on walker for ambulation     w/c for long distance    Depression     Diabetes mellitus (HCC)     insulin dependent    Dizziness     occ    Enlarged prostate     GERD (gastroesophageal reflux disease)     Heart failure (HCC)     Hiatal hernia     Hypercholesterolemia     Hypertension     MI (myocardial infarction)     Migraine     Neuropathy     Oxygen dependent     Q HS  2LPM with CPAP and prn during day 2-3 LPM     Shortness of breath     Sleep apnea     Stented coronary artery     Stroke ([facility] [address] )     vision loss    Urinary frequency     Wears dentures     Wears glasses        Past Surgical History:   Procedure Laterality Date    BACK SURGERY      BRAIN SURGERY      CARDIAC CATHETERIZATION      with stents    CEREBRAL ANEURYSM REPAIR      with coils    COLONOSCOPY      ESOPHAGOGASTRODUODENOSCOPY N/A 7/1/2016    Procedure: ESOPHAGOGASTRODUODENOSCOPY (EGD); Surgeon: Katerin Ferrer MD;  [facility]: [facility]; Service:     HERNIA REPAIR      HIATAL HERNIA REPAIR      KNEE ARTHROSCOPY Right     KNEE ARTHROSCOPY Right     PERONEAL NERVE DECOMPRESSION Right     CO ESOPHAGOGASTRODUODENOSCOPY TRANSORAL DIAGNOSTIC N/A 2/27/2017    Procedure: ESOPHAGOGASTRODUODENOSCOPY (EGD); Surgeon: Katerin Ferrer MD;  Location: [facility];   Service: Gastroenterology    CO INSERT SPINE INFUSN [address] [place] [address] N/A 1/19/2017    Procedure: REMOVAL / Jerone Cao;  Surgeon: Howard Carrillo MD;  [facility];  Service: Orthopedics    CO INSERT SPINE INFUSN [address] [place] [address] N/A 5/16/2016    Procedure: REPLACEMENT AND PROGRAM PUMP ;  Surgeon: Howard Carrillo MD;  [facility];  Service: Orthopedics       Meds/Allergies:    all medications and allergies reviewed    Allergies: Not on File    Social History:     Marital Status: /Civil Union    Substance Use History:   History   Alcohol Use No     History   Smoking Status    Never Smoker   Smokeless Tobacco    Never Used     History   Drug Use No       Family History:    non-contributory    Physical Exam:     Vitals:   Blood Pressure: 102/66 (11/04/17 1603)  Pulse: 63 (11/04/17 1603)  Temperature: 99 3 °F (37 4 °C) (11/04/17 1603)  Temp Source: Temporal (11/04/17 1603)  Respirations: 20 (11/04/17 1603)  Height: 6' 2" (188 cm) (11/03/17 0706)  Weight - Scale: (!) 169 kg (372 lb) (11/03/17 0706)  SpO2: 92 % (11/04/17 46)    Physical Exam   Constitutional: He is oriented to person, place, and time  He appears well-developed and well-nourished  No distress  HENT:   Head: Normocephalic and atraumatic  Eyes: Conjunctivae are normal    Cardiovascular: Normal rate, regular rhythm and normal heart sounds  Pulmonary/Chest: Effort normal and breath sounds normal  No respiratory distress  He has no wheezes  He has no rales  He exhibits no tenderness  Abdominal: Soft  Bowel sounds are normal  He exhibits no distension and no mass  There is no tenderness  There is no rebound and no guarding  Musculoskeletal: He exhibits no edema  No pitting edema on lower extremities  Neurological: He is alert and oriented to person, place, and time  Skin: Skin is warm and dry  He is not diaphoretic  Psychiatric: He has a normal mood and affect  His behavior is normal  Judgment and thought content normal    Nursing note and vitals reviewed  Additional Data:     Lab Results: I have personally reviewed pertinent reports  Results from last 7 days  Lab Units 11/04/17  0523   WBC Thousand/uL 14 45*   HEMOGLOBIN g/dL 12 5   HEMATOCRIT % 39 5   PLATELETS Thousands/uL 225       Results from last 7 days  Lab Units 11/04/17  0523   SODIUM mmol/L 137   POTASSIUM mmol/L 2 6*   CHLORIDE mmol/L 97*   CO2 mmol/L 33*   BUN mg/dL 27*   CREATININE mg/dL 1 78*   CALCIUM mg/dL 8 8   GLUCOSE RANDOM mg/dL 122           Imaging: I have personally reviewed pertinent reports  Xr Chest Portable    Result Date: 10/12/2017  Narrative: CHEST INDICATION:  Chest pain for 5 days  COMPARISON:  9/5/2017 VIEWS:   AP frontal IMAGES:  1 FINDINGS:     Cardiomediastinal silhouette appears unremarkable  The lungs are clear  No pneumothorax or pleural effusion  Visualized osseous structures appear within normal limits for the patient's age  Impression: No active pulmonary disease   Workstation performed: [id number]           ** Please Note: This note has been constructed using a voice recognition system   **

## 2017-11-04 NOTE — CASE MANAGEMENT
Initial Clinical Review    Age/Sex: 49 y o  male    Surgery Date: 11/3/17                    OP NC Bed      Update: 11/4/17 per cardiology, patient will not be discharged today due to hypokalemia and new onset renal failure    Procedure: CARDIAC CATH  CORONARY CIRCULATION:Distal LAD: There was a 95 % stenosis  Proximal circumflex: There was a 0 % stenosis at the site of a prior stent  Mid RCA: There was a 85 % stenosis  RECOMMENDATIONS  weight loss, statin, dapt x 1 year  Anesthesia: sedation    Admission Orders: Date/Time/Statement: outpatient no charge bed 11/3/17 @0956  11/03/17 0956  Outpatient No Charge Bed Once     Transfer Service: Cardiology       Question: Admitting Physician Answer: Gifty Toth    11/03/17 7046       Vital Signs: /61   Pulse 68   Temp (!) 97 4 °F (36 3 °C) (Tympanic)   Resp 20   Ht 6' 2" (1 88 m)   Wt (!) 169 kg (372 lb)   SpO2 93%   BMI 47 76 kg/m²     11/4: k 2 6   Cl 97   co2 33   Bun 27   Creat 1 78   Gluc 122, 165   Wbc 14 45     11/3 EKG: nsr, 1st degr av block, qt has shortened    Diet:        Diet Orders            Start     Ordered    11/03/17 1059  Room Service  Once     Question:  Type of Service  Answer:  Room Service-Appropriate    11/03/17 1058    11/03/17 1011  Diet Cardiac; Cardiac TLC 2 3 GM NA  Diet effective now     Question Answer Comment   Diet Type Cardiac    Cardiac Cardiac TLC 2 3 GM NA    RD to adjust diet per protocol?  Yes        11/03/17 1010          Mobility: ambulatory    DVT Prophylaxis: ambulatory, plavix    Scheduled Meds:  aspirin 81 mg Oral Daily   atorvastatin 40 mg Oral HS   baclofen 10 mg Oral TID   cholecalciferol 2,000 Units Oral Daily   clopidogrel 75 mg Oral Daily   Empagliflozin 25 mg Oral Daily   fluticasone-salmeterol 1 puff Inhalation BID   gabapentin 800 mg Oral BID   insulin lispro 2-12 Units Subcutaneous TID AC   insulin lispro 25 Units Subcutaneous TID With Meals   isosorbide mononitrate 30 mg Oral Daily losartan 25 mg Oral Daily   lubiprostone 24 mcg Oral BID With Meals   memantine 10 mg Oral Daily   metolazone 5 mg Oral Weekly   nebivolol 10 mg Oral Daily   pantoprazole 20 mg Oral BID AC   polyethylene glycol 17 g Oral Daily   potassium chloride 20 mEq Oral TID   ranolazine 1,000 mg Oral BID   spironolactone 25 mg Oral Daily   tamsulosin 0 4 mg Oral Daily With Dinner   tiZANidine 2 mg Oral TID   topiramate 100 mg Oral BID   torsemide 100 mg Oral BID   traZODone 100 mg Oral HS     Continuous Infusions:  sodium chloride 100 mL/hr Last Rate: 100 mL/hr (11/03/17 1018)     PRN Meds:   albuterol    nitroglycerin    oxyCODONE (PO x 4)    zolpidem

## 2017-11-05 LAB
ANION GAP SERPL CALCULATED.3IONS-SCNC: 7 MMOL/L (ref 4–13)
BUN SERPL-MCNC: 33 MG/DL (ref 5–25)
CALCIUM SERPL-MCNC: 8.4 MG/DL (ref 8.3–10.1)
CHLORIDE SERPL-SCNC: 97 MMOL/L (ref 100–108)
CO2 SERPL-SCNC: 34 MMOL/L (ref 21–32)
CREAT SERPL-MCNC: 2.04 MG/DL (ref 0.6–1.3)
GFR SERPL CREATININE-BSD FRML MDRD: 36 ML/MIN/1.73SQ M
GLUCOSE P FAST SERPL-MCNC: 140 MG/DL (ref 65–99)
GLUCOSE SERPL-MCNC: 122 MG/DL (ref 65–140)
GLUCOSE SERPL-MCNC: 131 MG/DL (ref 65–140)
GLUCOSE SERPL-MCNC: 140 MG/DL (ref 65–140)
GLUCOSE SERPL-MCNC: 140 MG/DL (ref 65–140)
GLUCOSE SERPL-MCNC: 227 MG/DL (ref 65–140)
POTASSIUM SERPL-SCNC: 3 MMOL/L (ref 3.5–5.3)
SODIUM SERPL-SCNC: 138 MMOL/L (ref 136–145)

## 2017-11-05 PROCEDURE — 80048 BASIC METABOLIC PNL TOTAL CA: CPT | Performed by: INTERNAL MEDICINE

## 2017-11-05 PROCEDURE — 82948 REAGENT STRIP/BLOOD GLUCOSE: CPT

## 2017-11-05 RX ORDER — INSULIN GLARGINE 100 [IU]/ML
40 INJECTION, SOLUTION SUBCUTANEOUS
Status: DISCONTINUED | OUTPATIENT
Start: 2017-11-05 | End: 2017-11-07 | Stop reason: HOSPADM

## 2017-11-05 RX ORDER — TORSEMIDE 20 MG/1
40 TABLET ORAL
Status: DISCONTINUED | OUTPATIENT
Start: 2017-11-05 | End: 2017-11-06

## 2017-11-05 RX ADMIN — INSULIN GLARGINE 40 UNITS: 100 INJECTION, SOLUTION SUBCUTANEOUS at 22:26

## 2017-11-05 RX ADMIN — POTASSIUM CHLORIDE 40 MEQ: 1500 TABLET, EXTENDED RELEASE ORAL at 17:17

## 2017-11-05 RX ADMIN — OXYCODONE HYDROCHLORIDE 30 MG: 10 TABLET ORAL at 11:24

## 2017-11-05 RX ADMIN — PANTOPRAZOLE SODIUM 20 MG: 20 TABLET, DELAYED RELEASE ORAL at 06:03

## 2017-11-05 RX ADMIN — INSULIN LISPRO 25 UNITS: 100 INJECTION, SOLUTION INTRAVENOUS; SUBCUTANEOUS at 09:51

## 2017-11-05 RX ADMIN — FLUTICASONE PROPIONATE AND SALMETEROL 1 PUFF: 50; 250 POWDER RESPIRATORY (INHALATION) at 09:51

## 2017-11-05 RX ADMIN — CLOPIDOGREL BISULFATE 75 MG: 75 TABLET ORAL at 09:50

## 2017-11-05 RX ADMIN — ZOLPIDEM TARTRATE 10 MG: 5 TABLET ORAL at 22:27

## 2017-11-05 RX ADMIN — NEBIVOLOL HYDROCHLORIDE 10 MG: 10 TABLET ORAL at 09:50

## 2017-11-05 RX ADMIN — OXYCODONE HYDROCHLORIDE 30 MG: 10 TABLET ORAL at 06:08

## 2017-11-05 RX ADMIN — BACLOFEN 10 MG: 10 TABLET ORAL at 09:50

## 2017-11-05 RX ADMIN — POTASSIUM CHLORIDE 40 MEQ: 1500 TABLET, EXTENDED RELEASE ORAL at 22:25

## 2017-11-05 RX ADMIN — LUBIPROSTONE 24 MCG: 24 CAPSULE, GELATIN COATED ORAL at 09:50

## 2017-11-05 RX ADMIN — POLYETHYLENE GLYCOL (3350) 17 G: 17 POWDER, FOR SOLUTION ORAL at 09:51

## 2017-11-05 RX ADMIN — ISOSORBIDE MONONITRATE 30 MG: 30 TABLET, EXTENDED RELEASE ORAL at 09:50

## 2017-11-05 RX ADMIN — TIZANIDINE 2 MG: 2 TABLET ORAL at 17:17

## 2017-11-05 RX ADMIN — TRAZODONE HYDROCHLORIDE 100 MG: 100 TABLET ORAL at 22:26

## 2017-11-05 RX ADMIN — GABAPENTIN 800 MG: 400 CAPSULE ORAL at 17:16

## 2017-11-05 RX ADMIN — LUBIPROSTONE 24 MCG: 24 CAPSULE, GELATIN COATED ORAL at 17:16

## 2017-11-05 RX ADMIN — BACLOFEN 10 MG: 10 TABLET ORAL at 17:15

## 2017-11-05 RX ADMIN — POTASSIUM CHLORIDE 40 MEQ: 1500 TABLET, EXTENDED RELEASE ORAL at 09:50

## 2017-11-05 RX ADMIN — TIZANIDINE 2 MG: 2 TABLET ORAL at 09:50

## 2017-11-05 RX ADMIN — GABAPENTIN 800 MG: 400 CAPSULE ORAL at 09:50

## 2017-11-05 RX ADMIN — OXYCODONE HYDROCHLORIDE 30 MG: 10 TABLET ORAL at 22:31

## 2017-11-05 RX ADMIN — VITAMIN D, TAB 1000IU (100/BT) 2000 UNITS: 25 TAB at 09:50

## 2017-11-05 RX ADMIN — ASPIRIN 81 MG 81 MG: 81 TABLET ORAL at 09:50

## 2017-11-05 RX ADMIN — ATORVASTATIN CALCIUM 40 MG: 40 TABLET, FILM COATED ORAL at 22:24

## 2017-11-05 RX ADMIN — TOPIRAMATE 100 MG: 25 TABLET, FILM COATED ORAL at 17:17

## 2017-11-05 RX ADMIN — RANOLAZINE 1000 MG: 500 TABLET, FILM COATED, EXTENDED RELEASE ORAL at 09:50

## 2017-11-05 RX ADMIN — PANTOPRAZOLE SODIUM 20 MG: 20 TABLET, DELAYED RELEASE ORAL at 17:16

## 2017-11-05 RX ADMIN — TAMSULOSIN HYDROCHLORIDE 0.4 MG: 0.4 CAPSULE ORAL at 17:17

## 2017-11-05 RX ADMIN — BACLOFEN 10 MG: 10 TABLET ORAL at 22:23

## 2017-11-05 RX ADMIN — SODIUM CHLORIDE 50 ML/HR: 0.9 INJECTION, SOLUTION INTRAVENOUS at 13:21

## 2017-11-05 RX ADMIN — INSULIN LISPRO 4 UNITS: 100 INJECTION, SOLUTION INTRAVENOUS; SUBCUTANEOUS at 17:16

## 2017-11-05 RX ADMIN — OXYCODONE HYDROCHLORIDE 30 MG: 10 TABLET ORAL at 17:17

## 2017-11-05 RX ADMIN — RANOLAZINE 1000 MG: 500 TABLET, FILM COATED, EXTENDED RELEASE ORAL at 17:17

## 2017-11-05 RX ADMIN — INSULIN LISPRO 25 UNITS: 100 INJECTION, SOLUTION INTRAVENOUS; SUBCUTANEOUS at 17:16

## 2017-11-05 RX ADMIN — TOPIRAMATE 100 MG: 25 TABLET, FILM COATED ORAL at 09:50

## 2017-11-05 RX ADMIN — INSULIN LISPRO 25 UNITS: 100 INJECTION, SOLUTION INTRAVENOUS; SUBCUTANEOUS at 12:24

## 2017-11-05 RX ADMIN — MEMANTINE 10 MG: 5 TABLET ORAL at 09:50

## 2017-11-05 RX ADMIN — FLUTICASONE PROPIONATE AND SALMETEROL 1 PUFF: 50; 250 POWDER RESPIRATORY (INHALATION) at 17:15

## 2017-11-05 RX ADMIN — TIZANIDINE 2 MG: 2 TABLET ORAL at 22:29

## 2017-11-05 RX ADMIN — TORSEMIDE 40 MG: 20 TABLET ORAL at 17:18

## 2017-11-05 NOTE — PHYSICIAN ADVISOR
This patient is a 49 y o  y/o male who is admitted to the hospital  on 11/3/2017 1010  History of Present Illness includes: The patient was directly admitted for a cardiac catheterization  The patient initially had LENNY and the patient underwent IVF for optimization of kidney function prior to the cardiac catheterization  Last vital signs were Blood pressure 119/58, pulse 59, temperature 97 8 °F (36 6 °C), temperature source Tympanic, resp  rate 20, height 6' 2" (1 88 m), weight (!) 169 kg (372 lb), SpO2 91 %  On exam, there is leg edema  The lungs are clear and the abdomen is soft and nontender  WBC is 14 4, K is 2 6, Cr is 1 78  The patient is being admitted with LENNY on CKD III as a direct admission in a patient who was being admitted for a cardiac catheterization  The plan of care includes IVF, repeat labs, medicine consultation, nephrology consultation  This patient is appropriate for INPATIENT admission as his length of stay is expected to be at least 2 midnights  Continued hospitalization is necessary to ensure stabilization of his clinical status

## 2017-11-05 NOTE — PROGRESS NOTES
Progress Note - Electrophysiology-Cardiology (EP)   Mela Bachelor 49 y o  male MRN: [id number]  Unit/Bed#: E4 -01 Encounter: [id number]    Assessment and Plan:  1  CAD, unstable angina, cardiac catheterization and stenting to the right coronary artery  Significant improvement of symptoms and is not having active chest pain  Patient was able to lay flat in bed  Has not needed nitroglycerin today      2    Acute renal failure  Greater than 50 percent increase in creatinine compared to last basal creatinine available  Patient is undergoing diuresis    3 , significant ventricular ectopy during mobilization,  Hypokalemia  Patient's potassium is 2 6  When we started mobilizing him with an intent to discharge, significant ventricular ectopy was noted      Although ischemic symptoms are better, patient has significant ventricular ectopy and new renal failure  Replacement of potassium is being done, close to 100-150 milliequivalents will be needed over the next 24 hours  Consult to medicine has been placed  Patient is going to stay at least another 24-48 hours, before he can be safely discharged-     The patient will require >2 midnight stay for management of  acute renal failure and  hypokalemis    Subjective/Objective   Chief Complaint:  Chest pain has significantly improved  He started mobilizing but very quickly felt short of breath  In addition felt palpitation      Review of Systems:    As described in my Chief complaint         Vitals: /66   Pulse 63   Temp 99 3 °F (37 4 °C) (Temporal)   Resp 20   Ht 6' 2" (1 88 m)   Wt (!) 169 kg (372 lb)   SpO2 92%   BMI 47 76 kg/m²     Vitals:    11/03/17 0706   Weight: (!) 169 kg (372 lb)       Orthostatic Blood Pressures    Flowsheet Row Most Recent Value   Blood Pressure  102/66 filed at 11/04/2017 1603   Patient Position - Orthostatic VS  Sitting filed at 11/04/2017 1603            Intake/Output Summary (Last 24 hours) at 11/04/17 2030  Last data filed at 11/04/17 1300   Gross per 24 hour   Intake              900 ml   Output                0 ml   Net              900 ml       Invasive Devices     Peripheral Intravenous Line            Peripheral IV 11/03/17 Left Hand 1 day                            Objective:   Physical Exam:      General -patient is not in any acute distress at the current time  Eyes-no significant pallor cyanosis  ENT-posterior pharynx is crowded, poor dentition  Neck-short thick neck, no jugular distention noticed, no palpable thyromegaly  Lungs-bilateral air entry present, diminished at the bases  Chest barrel chest cyst  Heart S1-S2 currently regular, once patient started mobilizing frequent ectopics noted, no S3 or S4, sounds are distant  Abdomen central obesity present, bowel sounds present, no significant tenderness  Extremities-chronic pedal edema present, no peripheral cyanosis or clubbing  Neurologic-alert and oriented, facial symmetry retained, extraocular movements retained, follows commands awake  Psychiatric -normal mood and affect  Back-no spinal tenderness   Skin-no rash ulcer nodules  Lymph node -no jugular lymphadenopathy           Medications:   Medication Administration - last 24 hours from 11/03/2017 2030 to 11/04/2017 2030       Date/Time Order Dose Route Action Action by     11/03/2017 2100 sodium chloride 0 9 % infusion 0 mL/hr Intravenous Stopped Coby Ambrose RN     11/04/2017 1942 sodium chloride 0 9 % infusion 50 mL/hr Intravenous Rate/Dose Change Coby Ambrose RN     11/03/2017 2151 atorvastatin (LIPITOR) tablet 40 mg 40 mg Oral Given Coby Ambrose RN     11/04/2017 0904 clopidogrel (PLAVIX) tablet 75 mg 75 mg Oral Given Rosealee Dubin, RN     11/04/2017 1712 fluticasone-salmeterol (ADVAIR) 250-50 mcg/dose inhaler 1 puff 1 puff Inhalation Given Rosealee Dubin, RN     11/04/2017 0904 fluticasone-salmeterol (ADVAIR) 250-50 mcg/dose inhaler 1 puff 1 puff Inhalation Given Rosealee Dubin, RN 11/03/2017 2229 fluticasone-salmeterol (ADVAIR) 250-50 mcg/dose inhaler 1 puff 1 puff Inhalation Given Samia Jacinto, RN     11/04/2017 1712 lubiprostone (AMITIZA) capsule 24 mcg 24 mcg Oral Given [facility] Evgeny, RN     11/04/2017 1122 lubiprostone (AMITIZA) capsule 24 mcg 24 mcg Oral Given [facility] Evgeny, RN     11/04/2017 0905 polyethylene glycol (MIRALAX) packet 17 g 17 g Oral Given [facility] Evgeny, RN     11/04/2017 1713 ranolazine (RANEXA) 12 hr tablet 1,000 mg 1,000 mg Oral Given Delmon Muscorestes, RN     11/04/2017 1231 ranolazine (RANEXA) 12 hr tablet 1,000 mg 1,000 mg Oral Given [facility] Evgeny, RN     11/04/2017 1713 topiramate (TOPAMAX) tablet 100 mg 100 mg Oral Given [facility] Evgeny, RN     11/04/2017 0908 topiramate (TOPAMAX) tablet 100 mg 100 mg Oral Given Delmon Muscorestes, RN     11/03/2017 2151 traZODone (DESYREL) tablet 100 mg 100 mg Oral Given Samia Jacinto, RN     11/04/2017 1712 tiZANidine (ZANAFLEX) tablet 2 mg 2 mg Oral Given [facility] Evgeny, RN     11/04/2017 0905 tiZANidine (ZANAFLEX) tablet 2 mg 2 mg Oral Given Delmon Muscorestes, RN     11/03/2017 2157 tiZANidine (ZANAFLEX) tablet 2 mg 2 mg Oral Given Samia Jacinto, RN     11/04/2017 1712 tamsulosin (FLOMAX) capsule 0 4 mg 0 4 mg Oral Given Delmon Muscorestes, RN     11/04/2017 1720 oxyCODONE (ROXICODONE) immediate release tablet 30 mg 30 mg Oral Given [facility] Evgeny, RN     11/04/2017 5521 oxyCODONE (ROXICODONE) immediate release tablet 30 mg 30 mg Oral Given [facility] Evgeny, RN     11/03/2017 2155 oxyCODONE (ROXICODONE) immediate release tablet 30 mg 30 mg Oral Given Samia Jacinto, RN     11/04/2017 0905 nebivolol (BYSTOLIC) tablet 10 mg 10 mg Oral Given Ella Pepper RN     11/04/2017 4939 aspirin chewable tablet 81 mg 81 mg Oral Given Ella Pepper RN     11/04/2017 1713 gabapentin (NEURONTIN) capsule 800 mg 800 mg Oral Given Ella Pepper RN     11/04/2017 0904 gabapentin (NEURONTIN) capsule 800 mg 800 mg Oral Given [facility], RN     11/04/2017 1712 pantoprazole (PROTONIX) EC tablet 20 mg 20 mg Oral Given [facility], RN     11/04/2017 0904 pantoprazole (PROTONIX) EC tablet 20 mg 20 mg Oral Given [facility], RN     11/04/2017 1252 Empagliflozin TABS 25 mg 25 mg Oral Not Given [facility], RN     11/04/2017 6793 spironolactone (ALDACTONE) tablet 25 mg 25 mg Oral Given [facility], RN     11/04/2017 1707 potassium chloride (K-DUR,KLOR-CON) CR tablet 20 mEq 20 mEq Oral Not Given [facility], RN     11/04/2017 0905 potassium chloride (K-DUR,KLOR-CON) CR tablet 20 mEq 20 mEq Oral Given [facility], RN     11/03/2017 2152 potassium chloride (K-DUR,KLOR-CON) CR tablet 20 mEq 20 mEq Oral Given [facility], RN     11/04/2017 1713 torsemide (DEMADEX) tablet 100 mg 100 mg Oral Given [facility], RN     11/04/2017 0905 torsemide (DEMADEX) tablet 100 mg 100 mg Oral Given [facility], RN     11/04/2017 0905 memantine (NAMENDA) tablet 10 mg 10 mg Oral Given [facility], RN     11/04/2017 0905 losartan (COZAAR) tablet 25 mg 25 mg Oral Given [facility], RN     11/04/2017 5014 cholecalciferol (VITAMIN D3) tablet 2,000 Units 2,000 Units Oral Given [facility], RN     11/04/2017 1712 baclofen tablet 10 mg 10 mg Oral Given [facility], RN     11/04/2017 1457 baclofen tablet 10 mg 10 mg Oral Given [facility], RN     11/03/2017 2151 baclofen tablet 10 mg 10 mg Oral Given [facility], RN     11/04/2017 6407 isosorbide mononitrate (IMDUR) 24 hr tablet 30 mg 30 mg Oral Given [facility], RN     11/04/2017 1714 insulin lispro (HumaLOG) 100 units/mL subcutaneous injection 2-12 Units 4 Units Subcutaneous Given [facility], RN     11/04/2017 1208 insulin lispro (HumaLOG) 100 units/mL subcutaneous injection 2-12 Units 2 Units Subcutaneous Given Ernie Ge, RN     11/04/2017 5773 insulin lispro (HumaLOG) 100 units/mL subcutaneous injection 2-12 Units 2 Units Subcutaneous Not Given Marli Perez RN     11/04/2017 1714 insulin lispro (HumaLOG) 100 units/mL subcutaneous injection 25 Units 25 Units Subcutaneous Given Marli Perez RN     11/04/2017 1208 insulin lispro (HumaLOG) 100 units/mL subcutaneous injection 25 Units 25 Units Subcutaneous Given Marli Perez RN     11/04/2017 4829 insulin lispro (HumaLOG) 100 units/mL subcutaneous injection 25 Units 25 Units Subcutaneous Given Marli Perez RN     11/03/2017 2229 zolpidem (AMBIEN) tablet 10 mg 10 mg Oral Given Savanna Alatorre RN     11/04/2017 1942 albumin human (FLEXBUMIN) 5 % injection 12 5 g 0 g Intravenous Stopped Savanna Alatorre RN     11/04/2017 1714 albumin human (FLEXBUMIN) 5 % injection 12 5 g 12 5 g Intravenous [facility], [address] [place] [address]     11/04/2017 1713 potassium chloride (K-DUR,KLOR-CON) CR tablet 40 mEq 40 mEq Oral Given Marli Perez RN            Current Facility-Administered Medications:     albuterol (PROVENTIL HFA,VENTOLIN HFA) inhaler 2 puff, 2 puff, Inhalation, Q6H PRN, Collin Carrasquillo PA-C    aspirin chewable tablet 81 mg, 81 mg, Oral, Daily, Collin Carrasquillo PA-C, 81 mg at 11/04/17 5490    atorvastatin (LIPITOR) tablet 40 mg, 40 mg, Oral, HS, Collin Carrasquillo PA-C, 40 mg at 11/03/17 2151    baclofen tablet 10 mg, 10 mg, Oral, TID, Collin Carrasquillo, PA-C, 10 mg at 11/04/17 1712    cholecalciferol (VITAMIN D3) tablet 2,000 Units, 2,000 Units, Oral, Daily, ANTHONY Forbes-C, 2,000 Units at 11/04/17 2262    clopidogrel (PLAVIX) tablet 75 mg, 75 mg, Oral, Daily, Collin Carrasquillo PA-C, 75 mg at 11/04/17 0273    fluticasone-salmeterol (ADVAIR) 250-50 mcg/dose inhaler 1 puff, 1 puff, Inhalation, BID, Collin Carrasquillo PA-C, 1 puff at 11/04/17 1712    gabapentin (NEURONTIN) capsule 800 mg, 800 mg, Oral, BID, Collin Carrasquillo PA-C, 800 mg at 11/04/17 1713    insulin lispro (HumaLOG) 100 units/mL subcutaneous injection 2-12 Units, 2-12 Units, Subcutaneous, TID AC, 4 Units at 11/04/17 1714 **AND** Fingerstick Glucose (POCT), , , TID AC, Geraldine Geljanis, PA-C    insulin lispro (HumaLOG) 100 units/mL subcutaneous injection 25 Units, 25 Units, Subcutaneous, TID With Meals, Geraldine Geljanis, PA-C, 25 Units at 11/04/17 1714    isosorbide mononitrate (IMDUR) 24 hr tablet 30 mg, 30 mg, Oral, Daily, Geraldine Gelineau, PA-C, 30 mg at 11/04/17 8052    lubiprostone (AMITIZA) capsule 24 mcg, 24 mcg, Oral, BID With Meals, Geraldine Geljanis, PA-C, 24 mcg at 11/04/17 1712    memantine (NAMENDA) tablet 10 mg, 10 mg, Oral, Daily, Geraldine Geljanis, PA-C, 10 mg at 11/04/17 5446    nebivolol (BYSTOLIC) tablet 10 mg, 10 mg, Oral, Daily, Geraldine Gelineau, PA-C, 10 mg at 11/04/17 7561    nitroglycerin (NITROSTAT) SL tablet 0 4 mg, 0 4 mg, Sublingual, Q5 Min PRN, Geraldine Gelineafederico, PA-C    oxyCODONE (ROXICODONE) immediate release tablet 30 mg, 30 mg, Oral, Q4H PRN, Geraldine Gelineafederico, PA-C, 30 mg at 11/04/17 1720    pantoprazole (PROTONIX) EC tablet 20 mg, 20 mg, Oral, BID AC, Geraldine Gelineau, PA-C, 20 mg at 11/04/17 1712    polyethylene glycol (MIRALAX) packet 17 g, 17 g, Oral, Daily, Geraldine Gelineau, PA-C, 17 g at 11/04/17 0905    potassium chloride (K-DUR,KLOR-CON) CR tablet 40 mEq, 40 mEq, Oral, TID, Joni Rene MD, 40 mEq at 11/04/17 1713    ranolazine (RANEXA) 12 hr tablet 1,000 mg, 1,000 mg, Oral, BID, Geraldine Gelineau, PA-C, 1,000 mg at 11/04/17 1713    sodium chloride 0 9 % infusion, 50 mL/hr, Intravenous, Continuous, Adriana Zafar PA-C, Last Rate: 50 mL/hr at 11/04/17 1942, 50 mL/hr at 11/04/17 1942    tamsulosin (FLOMAX) capsule 0 4 mg, 0 4 mg, Oral, Daily With Dinner, Geraldine Garcia PA-C, 0 4 mg at 11/04/17 1712    tiZANidine (ZANAFLEX) tablet 2 mg, 2 mg, Oral, TID, Geradline Garcia PA-C, 2 mg at 11/04/17 1712    topiramate (TOPAMAX) tablet 100 mg, 100 mg, Oral, BID, Geraldine Garcia PA-C, 100 mg at 11/04/17 1713   traZODone (DESYREL) tablet 100 mg, 100 mg, Oral, HS, Wendy Harden PA-C, 100 mg at 11/03/17 2151    zolpidem (AMBIEN) tablet 10 mg, 10 mg, Oral, HS PRN, Jeaneen Prom Spatzer, CRNP, 10 mg at 11/03/17 2229    Lab Results:   CBC with diff: Lab Results   Component Value Date    WBC 14 45 (H) 11/04/2017    HGB 12 5 11/04/2017    HCT 39 5 11/04/2017    MCV 88 11/04/2017     11/04/2017    MCH 28 0 11/04/2017    MCHC 31 6 11/04/2017    RDW 16 8 (H) 11/04/2017    MPV 10 4 11/04/2017    NRBC 0 10/12/2017       CMP:  Lab Results   Component Value Date     11/04/2017     (L) 10/21/2015    K 2 6 (LL) 11/04/2017    K 3 5 10/21/2015    CL 97 (L) 11/04/2017     10/21/2015    CO2 33 (H) 11/04/2017    CO2 24 4 10/21/2015    ANIONGAP 7 11/04/2017    ANIONGAP 9 10/21/2015    BUN 27 (H) 11/04/2017    BUN 12 10/21/2015    CREATININE 1 78 (H) 11/04/2017    CREATININE 1 03 10/21/2015    GLUCOSE 122 11/04/2017    GLUCOSE 224 (H) 10/21/2015    CALCIUM 8 8 11/04/2017    CALCIUM 8 2 (L) 10/21/2015    AST 16 10/12/2017    AST 25 10/19/2015    ALT 30 10/12/2017    ALT 25 10/19/2015    ALKPHOS 117 (H) 10/12/2017    ALKPHOS 127 (H) 10/19/2015    PROT 6 9 10/12/2017    PROT 5 9 (L) 10/19/2015    ALBUMIN 2 8 (L) 10/12/2017    ALBUMIN 2 7 (L) 10/19/2015    BILITOT 0 46 10/12/2017    BILITOT 0 81 10/19/2015    EGFR 42 11/04/2017       BMP:  Results from last 7 days  Lab Units 11/04/17  0523 11/02/17  1010   SODIUM mmol/L 137 141   POTASSIUM mmol/L 2 6* 3 7   CHLORIDE mmol/L 97* 102   CO2 mmol/L 33* 36*   BUN mg/dL 27* 33*   CREATININE mg/dL 1 78* 1 81*   GLUCOSE RANDOM mg/dL 122 171*   CALCIUM mg/dL 8 8 8 5       Magnesium:   Lab Results   Component Value Date    MG 3 0 (H) 10/13/2017    MG 2 3 10/20/2015       CPK:       Troponin:   Lab Results   Component Value Date    TROPONINI <0 02 10/13/2017    TROPONINI <0 02 10/18/2015       BNP:   Lab Results   Component Value Date    BNP 54 01/19/2016       Coags:   Lab Results Component Value Date    PTT 22 (L) 10/12/2017    PTT 27 01/05/2015    INR 1 04 10/12/2017    INR 1 02 10/18/2015       TSH: No components found for: TSH    Lipid Profile: No results found for: LABLIPI      Imaging:   Xr Chest Portable  Result Date: 10/12/2017  Narrative: CHEST INDICATION:  Chest pain for 5 days  COMPARISON:  9/5/2017 VIEWS:   AP frontal IMAGES:  1 FINDINGS:     Cardiomediastinal silhouette appears unremarkable  The lungs are clear  No pneumothorax or pleural effusion  Visualized osseous structures appear within normal limits for the patient's age  Impression: No active pulmonary disease   Workstation performed: [id number]         EKG:  EKG reviewed by myself, normal sinus rhythm, frequent PVCs once patient is mobilising - seen on telemetry        Counseling / Coordination of Care  A total of 45 minutes was spent with the patient, initially discussing discharge, thereafter significant PVCs noted when is mobilizing, hence discharge had to be with head  Patient is in agreement especially with his limited exertional tolerance to stay back till his potassium is corrected and renal  failure is treated      Gilberto Ricardo MD

## 2017-11-05 NOTE — PLAN OF CARE
Problem: Potential for Falls  Goal: Patient will remain free of falls  INTERVENTIONS:  - Assess patient frequently for physical needs  -  Identify cognitive and physical deficits and behaviors that affect risk of falls    -  [place] fall precautions as indicated by assessment   - Educate patient/family on patient safety including physical limitations  - Instruct patient to call for assistance with activity based on assessment  - Modify environment to reduce risk of injury  - Consider OT/PT consult to assist with strengthening/mobility   Outcome: Progressing      Problem: DISCHARGE PLANNING - CARE MANAGEMENT  Goal: Discharge to post-acute care or home with appropriate resources  INTERVENTIONS:  - Conduct assessment to determine patient/family and health care team treatment goals, and need for post-acute services based on payer coverage, community resources, and patient preferences, and barriers to discharge  - Address psychosocial, clinical, and financial barriers to discharge as identified in assessment in conjunction with the patient/family and health care team  - Arrange appropriate level of post-acute services according to patients   needs and preference and payer coverage in collaboration with the physician and health care team  - Communicate with and update the patient/family, physician, and health care team regarding progress on the discharge plan  - Arrange appropriate transportation to post-acute venues   Outcome: Progressing      Problem: PAIN - ADULT  Goal: Verbalizes/displays adequate comfort level or baseline comfort level  Interventions:  - Encourage patient to monitor pain and request assistance  - Assess pain using appropriate pain scale  - Administer analgesics based on type and severity of pain and evaluate response  - Implement non-pharmacological measures as appropriate and evaluate response  - Consider cultural and social influences on pain and pain management  - Notify physician/advanced practitioner if interventions unsuccessful or patient reports new pain   Outcome: Progressing      Problem: SAFETY ADULT  Goal: Maintain or return to baseline ADL function  INTERVENTIONS:  -  Assess patient's ability to carry out ADLs; assess patient's baseline for ADL function and identify physical deficits which impact ability to perform ADLs (bathing, care of mouth/teeth, toileting, grooming, dressing, etc )  - Assess/evaluate cause of self-care deficits   - Assess range of motion  - Assess patient's mobility; develop plan if impaired  - Assess patient's need for assistive devices and provide as appropriate  - Encourage maximum independence but intervene and supervise when necessary  ¯ Involve family in performance of ADLs  ¯ Assess for home care needs following discharge   ¯ Request OT consult to assist with ADL evaluation and planning for discharge  ¯ Provide patient education as appropriate   Outcome: Progressing    Goal: Maintain or return mobility status to optimal level  INTERVENTIONS:  - Assess patient's baseline mobility status (ambulation, transfers, stairs, etc )    - Identify cognitive and physical deficits and behaviors that affect mobility  - Identify mobility aids required to assist with transfers and/or ambulation (gait belt, sit-to-stand, lift, walker, cane, etc )  - [place] fall precautions as indicated by assessment  - Record patient progress and toleration of activity level on Mobility SBAR; progress patient to next Phase/Stage  - Instruct patient to call for assistance with activity based on assessment  - Request Rehabilitation consult to assist with strengthening/weightbearing, etc    Outcome: Progressing      Problem: Knowledge Deficit  Goal: Patient/family/caregiver demonstrates understanding of disease process, treatment plan, medications, and discharge instructions  Complete learning assessment and assess knowledge base    Interventions:  - Provide teaching at level of understanding  - Provide teaching via preferred learning methods   Outcome: Progressing      Problem: CARDIOVASCULAR - ADULT  Goal: Maintains optimal cardiac output and hemodynamic stability  INTERVENTIONS:  - Monitor I/O, vital signs and rhythm  - Monitor for S/S and trends of decreased cardiac output i e  bleeding, hypotension  - Administer and titrate ordered vasoactive medications to optimize hemodynamic stability  - Assess quality of pulses, skin color and temperature  - Assess for signs of decreased coronary artery perfusion - ex   Angina  - Instruct patient to report change in severity of symptoms   Outcome: Progressing    Goal: Absence of cardiac dysrhythmias or at baseline rhythm  INTERVENTIONS:  - Continuous cardiac monitoring, monitor vital signs, obtain 12 lead EKG if indicated  - Administer antiarrhythmic and heart rate control medications as ordered  - Monitor electrolytes and administer replacement therapy as ordered   Outcome: Progressing

## 2017-11-05 NOTE — CASE MANAGEMENT
Continued Stay Review    Date: 11/5/17    Outpatient no charge bed CHANGED TO INPATIENT 11/5/17 @8416  11/05/17 0905  Inpatient Admission Once     Transfer Service: Cardiology       Question Answer Comment   Admitting Physician KANG PERDOMO    Level of Care Med Surg    Bed Type [place]    Estimated length of stay More than 2 Midnights    Certification I certify that inpatient services are medically necessary for this patient for a duration of greater than two midnights  See H&P and MD Progress Notes for additional information about the patient's course of treatment          11/05/17 0906   The patient will require >2 midnight stay for management of  acute renal failure and  hypokalemia      Vital Signs: /74   Pulse 60   Temp 98 2 °F (36 8 °C) (Temporal)   Resp 20   Ht 6' 2" (1 88 m)   Wt (!) 169 kg (372 lb)   SpO2 96%   BMI 47 76 kg/m²     Medications:   Scheduled Meds:   aspirin 81 mg Oral Daily   atorvastatin 40 mg Oral HS   baclofen 10 mg Oral TID   cholecalciferol 2,000 Units Oral Daily   clopidogrel 75 mg Oral Daily   fluticasone-salmeterol 1 puff Inhalation BID   gabapentin 800 mg Oral BID   insulin lispro 2-12 Units Subcutaneous TID AC   insulin lispro 25 Units Subcutaneous TID With Meals   isosorbide mononitrate 30 mg Oral Daily   lubiprostone 24 mcg Oral BID With Meals   memantine 10 mg Oral Daily   nebivolol 10 mg Oral Daily   pantoprazole 20 mg Oral BID AC   polyethylene glycol 17 g Oral Daily   potassium chloride 40 mEq Oral TID   ranolazine 1,000 mg Oral BID   tamsulosin 0 4 mg Oral Daily With Dinner   tiZANidine 2 mg Oral TID   topiramate 100 mg Oral BID   traZODone 100 mg Oral HS     Continuous Infusions:   sodium chloride 50 mL/hr Last Rate: 50 mL/hr (11/04/17 1942)     PRN Meds:   albuterol    nitroglycerin    oxyCODONE (PO x 2 addtl doses 11/4, x 1 on 11/5)    zolpidem    Abnormal Labs/Diagnostic Results:   No new labs on 11/5 at this time    Age/Sex: 49 y o  male Assessment/Plan:   PER MEDICINE CONS 11/4:  Raúl Shin is a 49 y o  male who is originally admitted to the Cardiology service on 11/3/2017 for a scheduled cardiac catheterization in the setting of ongoing chest discomfort x 3 weeks  Follows with Dr Frandy Carrasquillo as an outpatient  We are consulted for medical management given patient with hypokalemia- potassium of 2 6 and mild LENNY in the setting of recent catheterization  Patient is doing well status post cardiac catheterization reports no chest symptoms since cath  Currently denies any shortness of breath, chest pain or pressure, palpitations, abdominal pain, dysuria, diarrhea, or constipation  · Hypokalemia:  Potassium of 2 6  Placed on potassium supplementation with 40 mEq 3 times daily  Will monitor on telemetry for any arrhythmias   LENNY on CKD stage 3:  Creatinine 1 78  Baseline appearing to be 1 2-1 4     · Chronic diastolic heart failure:  Continue beta-blocker  On spirolactone 25 mg daily, torsemide 100 mg twice daily and Zaroxolyn 5 mg once a week  Diuretics held in the setting of LENNY and recent cath with need for gentle IV fluids  -current weight 372 lbs                          -appears baseline dry weight ranges 366-371   · Type 2 diabetes:  A1c 6 9  Home regimen of Humalog 25 units before meals and degludec 110 units before bed    · CAD with prior cardiac stenting  Underwent further OMER here to RCA  Continue beta-blocker, statin, aspirin, Plavix, Ranexa    · Essential hypertension:  Continue home regimen of Imdur, losartan, Bystolic    · Hyperlipidemia:  Lipid panel from today reveals cholesterol 110, triglycerides 177, HDL 27, LDL 48    Continue statin   · ARLEEN:  Compliant with CPAP   · Migraine prophylaxis:  On Topamax and receives injections by Neurology   · GERD:  Continue PPI    VTE Prophylaxis: Sequential compression device Cynthia Hu   / sequential compression device   Attestation signed by Dlefino Godfrey Cleve Lou MD at 11/4/2017 7:21 PM   Patient seen and examined  Discussed with Sridevi Bond Pac  Agree with her note below  We were consulted for acute kidney injury and severe hypokalemia  He has known history of morbid obesity, CAD and chronic diastolic heart failure, and chronic kidney disease stage 3  He is on high-dose torsemide 100 mg b i d , and Zaroxolyn 5 mg weekly  He underwent a catheterization yesterday receiving 70 mL of Omnipaque  Discharge was anticipated today however are repeat labs showed elevated creatinine and potassium of 2 6  On exam he is morbidly obese  Lungs are clear  He only has trace edema on both legs      · Acute kidney injury on chronic kidney disease stage 3-with recent dye exposure  The patient appears euvolemic  Given recent dye exposure and risk for contrast induced nephropathy, hold diuretics, hold ARB, start careful fluid hydration, repeat labs in a m     · Hypokalemia-secondary to diuretics  Increase potassium to 40 mEq t i d  repeat labs in a m  · Chronic diastolic CHF-last echo showed preserved ejection fraction  Continue beta-blocker  Hold diuretics  · Diabetes type 2-A1c at goal   · CAD-status post recent coronary angiography and drug-eluting stent placement in the mid RCA  Currently chest pain-free and stable          PER CARDIOLOGY 11/4 2030:  Chief Complaint:  Chest pain has significantly improved  He started mobilizing but very quickly felt short of breath  In addition felt palpitation     Assessment and Plan:  1  CAD, unstable angina, cardiac catheterization and stenting to the right coronary artery  Significant improvement of symptoms and is not having active chest pain  Patient was able to lay flat in bed  Has not needed nitroglycerin today    2    Acute renal failure  Greater than 50 percent increase in creatinine compared to last basal creatinine available  Patient is undergoing diuresis   3 , significant ventricular ectopy during mobilization, Hypokalemia  Patient's potassium is 2 6  When we started mobilizing him with an intent to discharge, significant ventricular ectopy was noted      Although ischemic symptoms are better, patient has significant ventricular ectopy and new renal failure  Replacement of potassium is being done, close to 100-150 milliequivalents will be needed over the next 24 hours  Consult to medicine has been placed  Discharge Plan:Patient is going to stay at least another 24-48 hours, before he can be safely discharged-

## 2017-11-05 NOTE — PROGRESS NOTES
Progress Note - Electrophysiology-Cardiology (EP)   Delbert Moran 49 y o  male MRN: [id number]  Unit/Bed#: E4 -01 Encounter: [id number]    Assessment and Plan:  Coronary artery disease, new RCA stent  Worsening acute renal failure, creatinine currently 2  Hypokalemia persisting despite replacement, potassium 3    Clinically patient is not having any symptoms of chest pain or pressure  However he has persisting hypokalemia and worsening kidney function  He does not have any new rash in his extremities or any painful extremity    Catheterization was done through the brachial artery, chances of cholesterol embolization is very minimal  This may be related to dye exposure and worsening kidney function in a diabetic    Reduce diuresis, continue to replace potassium, consult Nephrology      Subjective/Objective   Chief Complaint:   Patient is not complaining of any active chest pain      Vitals: /79   Pulse 60   Temp 98 2 °F (36 8 °C) (Temporal)   Resp 20   Ht 6' 2" (1 88 m)   Wt (!) 169 kg (372 lb)   SpO2 96%   BMI 47 76 kg/m²     Vitals:    11/03/17 0706   Weight: (!) 169 kg (372 lb)       Orthostatic Blood Pressures    Flowsheet Row Most Recent Value   Blood Pressure  129/79 filed at 11/05/2017 1135   Patient Position - Orthostatic VS  Sitting filed at 11/05/2017 1135            Intake/Output Summary (Last 24 hours) at 11/05/17 1228  Last data filed at 11/05/17 0900   Gross per 24 hour   Intake             1440 ml   Output                0 ml   Net             1440 ml       Invasive Devices     Peripheral Intravenous Line            Peripheral IV 11/03/17 Left Hand 2 days                            Objective:   Physical Exam:    General -patient is not in any acute distress at the current time  Lungs-bilateral air entry present, diminished at the bases, some scattered crackles which clear posttussive   Heart S1-S2 currently regular, once patient started mobilizing frequent ectopics noted, no S3 or S4, sounds are distant  Abdomen central obesity present, bowel sounds present, no significant tenderness  Extremities-chronic pedal edema present, no peripheral cyanosis or clubbing         Medications:   Medication Administration - last 24 hours from 11/04/2017 1228 to 11/05/2017 1228       Date/Time Order Dose Route Action Action by     11/04/2017 1942 sodium chloride 0 9 % infusion 50 mL/hr Intravenous Rate/Dose Change Jud Lindsay RN     11/04/2017 2154 atorvastatin (LIPITOR) tablet 40 mg 40 mg Oral Given Jud Lindsay RN     11/05/2017 0950 clopidogrel (PLAVIX) tablet 75 mg 75 mg Oral Given Toby Smith RN     11/05/2017 0951 fluticasone-salmeterol (ADVAIR) 250-50 mcg/dose inhaler 1 puff 1 puff Inhalation Given Toby Smith RN     11/04/2017 1712 fluticasone-salmeterol (ADVAIR) 250-50 mcg/dose inhaler 1 puff 1 puff Inhalation Given Marylin Craven RN     11/05/2017 0950 lubiprostone (AMITIZA) capsule 24 mcg 24 mcg Oral Given Toby Smith RN     11/04/2017 1712 lubiprostone (AMITIZA) capsule 24 mcg 24 mcg Oral Given Marylin Craven RN     11/05/2017 0951 polyethylene glycol (MIRALAX) packet 17 g 17 g Oral Given Toby Smith RN     11/05/2017 0950 ranolazine (RANEXA) 12 hr tablet 1,000 mg 1,000 mg Oral Given Toby Smith RN     11/04/2017 1713 ranolazine (RANEXA) 12 hr tablet 1,000 mg 1,000 mg Oral Given Marylin Craven RN     11/05/2017 0950 topiramate (TOPAMAX) tablet 100 mg 100 mg Oral Given Toby Smith RN     11/04/2017 1713 topiramate (TOPAMAX) tablet 100 mg 100 mg Oral Given Marylin Craven RN     11/04/2017 2154 traZODone (DESYREL) tablet 100 mg 100 mg Oral Given Jud Linsday RN     11/05/2017 0950 tiZANidine (ZANAFLEX) tablet 2 mg 2 mg Oral Given Toby Smith RN     11/04/2017 2156 tiZANidine (ZANAFLEX) tablet 2 mg 2 mg Oral Given Jud Lindsay RN     11/04/2017 1712 tiZANidine (ZANAFLEX) tablet 2 mg 2 mg Oral Given Marylin Craven RN 11/04/2017 1712 tamsulosin (FLOMAX) capsule 0 4 mg 0 4 mg Oral Given Mart Fair RN     11/05/2017 1124 oxyCODONE (ROXICODONE) immediate release tablet 30 mg 30 mg Oral Given Octavio FriendsHAILEY     11/05/2017 3071 oxyCODONE (ROXICODONE) immediate release tablet 30 mg 30 mg Oral Given Alanna Bellamy RN     11/04/2017 2156 oxyCODONE (ROXICODONE) immediate release tablet 30 mg 30 mg Oral Given Alanna Bellamy RN     11/04/2017 1720 oxyCODONE (ROXICODONE) immediate release tablet 30 mg 30 mg Oral Given Bridgtonelizabeth Fair RN     11/05/2017 0950 nebivolol (BYSTOLIC) tablet 10 mg 10 mg Oral Given [facility] [place], RN     11/05/2017 4216 aspirin chewable tablet 81 mg 81 mg Oral Given [facility] [place] RN     11/05/2017 0950 gabapentin (NEURONTIN) capsule 800 mg 800 mg Oral Given Octavio FriendsHAILEY     11/04/2017 1713 gabapentin (NEURONTIN) capsule 800 mg 800 mg Oral Given Martelizabeth IsraelHAILEY     11/05/2017 0603 pantoprazole (PROTONIX) EC tablet 20 mg 20 mg Oral Given Alanna Bellamy RN     11/04/2017 1712 pantoprazole (PROTONIX) EC tablet 20 mg 20 mg Oral Given Martelizabeth Fair RN     11/04/2017 1707 potassium chloride (K-DUR,KLOR-CON) CR tablet 20 mEq 20 mEq Oral Not Given Mart Fair RN     11/04/2017 1713 torsemide (DEMADEX) tablet 100 mg 100 mg Oral Given Martelizabeth IsraelHAILEY     11/05/2017 0950 memantine (NAMENDA) tablet 10 mg 10 mg Oral Given [facility] [place], RN     11/05/2017 4949 cholecalciferol (VITAMIN D3) tablet 2,000 Units 2,000 Units Oral Given [place] [facility], RN     11/05/2017 0950 baclofen tablet 10 mg 10 mg Oral Given [place] [facility], RN     11/04/2017 2153 baclofen tablet 10 mg 10 mg Oral Given Alanna Bellamy RN     11/04/2017 1712 baclofen tablet 10 mg 10 mg Oral Given Bridgtonelizabeth IsraelHAILEY     11/05/2017 0950 isosorbide mononitrate (IMDUR) 24 hr tablet 30 mg 30 mg Oral Given Octavio Ang, RN     11/05/2017 1127 insulin lispro (HumaLOG) 100 units/mL subcutaneous injection 2-12 Units 2 Units Subcutaneous Not Given Ilsa Phelan RN     11/05/2017 0940 insulin lispro (HumaLOG) 100 units/mL subcutaneous injection 2-12 Units 2 Units Subcutaneous Not Given Ilsa Phelan RN     11/04/2017 1714 insulin lispro (HumaLOG) 100 units/mL subcutaneous injection 2-12 Units 4 Units Subcutaneous Given Leah Faye RN     11/05/2017 1224 insulin lispro (HumaLOG) 100 units/mL subcutaneous injection 25 Units 25 Units Subcutaneous Given Ilsa Phelan RN     11/05/2017 0951 insulin lispro (HumaLOG) 100 units/mL subcutaneous injection 25 Units 25 Units Subcutaneous Given Ilsa Phelan RN     11/04/2017 1714 insulin lispro (HumaLOG) 100 units/mL subcutaneous injection 25 Units 25 Units Subcutaneous Given Leah Faye RN     11/04/2017 2154 zolpidem (AMBIEN) tablet 10 mg 10 mg Oral Given Ferny Simmons RN     11/04/2017 1942 albumin human (FLEXBUMIN) 5 % injection 12 5 g 0 g Intravenous Stopped Ferny Simmons RN     11/04/2017 1714 albumin human (FLEXBUMIN) 5 % injection 12 5 g 12 5 g Intravenous [address], [address] [place] [address]     11/05/2017 0950 potassium chloride (K-DUR,KLOR-CON) CR tablet 40 mEq 40 mEq Oral Given Ilsa Phelan RN     11/04/2017 2153 potassium chloride (K-DUR,KLOR-CON) CR tablet 40 mEq 40 mEq Oral Given Ferny Simmons RN     11/04/2017 1713 potassium chloride (K-DUR,KLOR-CON) CR tablet 40 mEq 40 mEq Oral Given Leah Faye RN            Current Facility-Administered Medications:     albuterol (PROVENTIL HFA,VENTOLIN HFA) inhaler 2 puff, 2 puff, Inhalation, Q6H PRN, Eleni Parrish PA-C    aspirin chewable tablet 81 mg, 81 mg, Oral, Daily, Eleni Parrish PA-C, 81 mg at 11/05/17 0950    atorvastatin (LIPITOR) tablet 40 mg, 40 mg, Oral, HS, Eleni Parrish, PA-C, 40 mg at 11/04/17 2154    baclofen tablet 10 mg, 10 mg, Oral, TID, Eleni Parrish, PA-C, 10 mg at 11/05/17 5595    cholecalciferol (VITAMIN D3) tablet 2,000 Units, 2,000 Units, Oral, Daily, Margene Mess, PA-C, 2,000 Units at 11/05/17 0950    clopidogrel (PLAVIX) tablet 75 mg, 75 mg, Oral, Daily, Geraldine Gelineau, PA-C, 75 mg at 11/05/17 0950    fluticasone-salmeterol (ADVAIR) 250-50 mcg/dose inhaler 1 puff, 1 puff, Inhalation, BID, Geraldine Gelineau, PA-C, 1 puff at 11/05/17 0584    gabapentin (NEURONTIN) capsule 800 mg, 800 mg, Oral, BID, Geraldine Gelineau, PA-C, 800 mg at 11/05/17 0950    insulin lispro (HumaLOG) 100 units/mL subcutaneous injection 2-12 Units, 2-12 Units, Subcutaneous, TID AC, 4 Units at 11/04/17 1714 **AND** Fingerstick Glucose (POCT), , , TID AC, Geraldine Gelineau, PA-C    insulin lispro (HumaLOG) 100 units/mL subcutaneous injection 25 Units, 25 Units, Subcutaneous, TID With Meals, Geraldine Gelineau, PA-C, 25 Units at 11/05/17 1224    isosorbide mononitrate (IMDUR) 24 hr tablet 30 mg, 30 mg, Oral, Daily, Geraldine Gelineau, PA-C, 30 mg at 11/05/17 5441    lubiprostone (AMITIZA) capsule 24 mcg, 24 mcg, Oral, BID With Meals, Geraldine Gelineau, PA-C, 24 mcg at 11/05/17 0950    memantine (NAMENDA) tablet 10 mg, 10 mg, Oral, Daily, Geraldine Gelineau, PA-C, 10 mg at 11/05/17 0950    nebivolol (BYSTOLIC) tablet 10 mg, 10 mg, Oral, Daily, Geraldine Gelineau, PA-C, 10 mg at 11/05/17 0950    nitroglycerin (NITROSTAT) SL tablet 0 4 mg, 0 4 mg, Sublingual, Q5 Min PRN, Geraldine Gelineau, PA-C    oxyCODONE (ROXICODONE) immediate release tablet 30 mg, 30 mg, Oral, Q4H PRN, Geraldine Gelineau, PA-C, 30 mg at 11/05/17 1124    pantoprazole (PROTONIX) EC tablet 20 mg, 20 mg, Oral, BID AC, Geraldine Gelineau, PA-C, 20 mg at 11/05/17 0603    polyethylene glycol (MIRALAX) packet 17 g, 17 g, Oral, Daily, Geraldine Gelineau, PA-C, 17 g at 11/05/17 0951    potassium chloride (K-DUR,KLOR-CON) CR tablet 40 mEq, 40 mEq, Oral, TID, Joni Rene MD, 40 mEq at 11/05/17 0950    ranolazine (RANEXA) 12 hr tablet 1,000 mg, 1,000 mg, Oral, BID, Geraldine Gelineau, PA-C, 1,000 mg at 11/05/17 0950    sodium chloride 0 9 % infusion, 50 mL/hr, Intravenous, Continuous, Anna Treviño PA-C, Last Rate: 50 mL/hr at 11/04/17 1942, 50 mL/hr at 11/04/17 1942    tamsulosin (FLOMAX) capsule 0 4 mg, 0 4 mg, Oral, Daily With ANTHONY Wiggins-C, 0 4 mg at 11/04/17 1712    tiZANidine (ZANAFLEX) tablet 2 mg, 2 mg, Oral, TID, Agustin De Paz PA-C, 2 mg at 11/05/17 3705    topiramate (TOPAMAX) tablet 100 mg, 100 mg, Oral, BID, Gwynda Baldev, PA-C, 100 mg at 11/05/17 9575    torsemide (DEMADEX) tablet 40 mg, 40 mg, Oral, BID (diuretic), Diane Salgado MD    traZODone (DESYREL) tablet 100 mg, 100 mg, Oral, HS, Gwynda Baldev, PA-C, 100 mg at 11/04/17 2154    zolpidem (AMBIEN) tablet 10 mg, 10 mg, Oral, HS PRN, Colvin Corwin Spatzer, SANTHOSHNP, 10 mg at 11/04/17 2154    Lab Results:   CBC with diff: Lab Results   Component Value Date    WBC 14 45 (H) 11/04/2017    HGB 12 5 11/04/2017    HCT 39 5 11/04/2017    MCV 88 11/04/2017     11/04/2017    MCH 28 0 11/04/2017    MCHC 31 6 11/04/2017    RDW 16 8 (H) 11/04/2017    MPV 10 4 11/04/2017    NRBC 0 10/12/2017       CMP:  Lab Results   Component Value Date     11/05/2017     (L) 10/21/2015    K 3 0 (L) 11/05/2017    K 3 5 10/21/2015    CL 97 (L) 11/05/2017     10/21/2015    CO2 34 (H) 11/05/2017    CO2 24 4 10/21/2015    ANIONGAP 7 11/05/2017    ANIONGAP 9 10/21/2015    BUN 33 (H) 11/05/2017    BUN 12 10/21/2015    CREATININE 2 04 (H) 11/05/2017    CREATININE 1 03 10/21/2015    GLUCOSE 140 11/05/2017    GLUCOSE 224 (H) 10/21/2015    CALCIUM 8 4 11/05/2017    CALCIUM 8 2 (L) 10/21/2015    AST 16 10/12/2017    AST 25 10/19/2015    ALT 30 10/12/2017    ALT 25 10/19/2015    ALKPHOS 117 (H) 10/12/2017    ALKPHOS 127 (H) 10/19/2015    PROT 6 9 10/12/2017    PROT 5 9 (L) 10/19/2015    ALBUMIN 2 8 (L) 10/12/2017    ALBUMIN 2 7 (L) 10/19/2015    BILITOT 0 46 10/12/2017    BILITOT 0 81 10/19/2015    EGFR 36 11/05/2017       BMP:  Results from last 7 days  Lab Units 11/05/17  0840 11/04/17  0523 11/02/17  1010   SODIUM mmol/L 138 137 141   POTASSIUM mmol/L 3 0* 2 6* 3 7   CHLORIDE mmol/L 97* 97* 102   CO2 mmol/L 34* 33* 36*   BUN mg/dL 33* 27* 33*   CREATININE mg/dL 2 04* 1 78* 1 81*   GLUCOSE RANDOM mg/dL 140 122 171*   CALCIUM mg/dL 8 4 8 8 8 5       Magnesium:   Lab Results   Component Value Date    MG 3 0 (H) 10/13/2017    MG 2 3 10/20/2015       CPK:       Troponin:   Lab Results   Component Value Date    TROPONINI <0 02 10/13/2017    TROPONINI <0 02 10/18/2015       BNP:   Lab Results   Component Value Date    BNP 54 01/19/2016       Coags:   Lab Results   Component Value Date    PTT 22 (L) 10/12/2017    PTT 27 01/05/2015    INR 1 04 10/12/2017    INR 1 02 10/18/2015       TSH: No components found for: TSH    Lipid Profile: No results found for: LABLIPI      Imaging:   Xr Chest Portable    Result Date: 10/12/2017  Narrative: CHEST INDICATION:  Chest pain for 5 days  COMPARISON:  9/5/2017 VIEWS:   AP frontal IMAGES:  1 FINDINGS:     Cardiomediastinal silhouette appears unremarkable  The lungs are clear  No pneumothorax or pleural effusion  Visualized osseous structures appear within normal limits for the patient's age  Impression: No active pulmonary disease   Workstation performed: [id number]         EKG:  EKG reviewed by myself, normal sinus rhythm,        Counseling / Coordination of Care  A total of 30 minutes spent with the patient, also called his home to update his wife  Explained to him the possible causes of worsening kidney function and why nephrology evaluation is needed      Shahid Goode MD

## 2017-11-05 NOTE — PROGRESS NOTES
[facility] Progress Note  Patient: Mela Bachelor 49 y o  male   MRN: [id number]  PCP: Karen Dawn MD  Unit/Bed#: E4 -19 Encounter: [id number]  Date Of Visit: 17    Assessment and plan:    Acute renal failure on chronic kidney disease stage 3-suspect this is due to contrast induced nephropathy     Continue gentle fluid hydration     Hold losartan, Aldactone, jardiance  Discussed with Cardiology, Dr Shahbaz Quijano who recommended reducing the dose instead of stopping torsemide due to risk for volume overload  Reduced torsemide dose to 40 b i d  from 120 mg b i d  Hypokalemia-continue potassium 40 mEq t i d     Diabetes type 2 with nephropathy-Tresiba dose verified with patient and all scripts  He is prescribed 110 units at night  Due to acute renal failure, start Lantus at reduced dose 40 units at night  Continue pre meal Humalog 25 t i d  Chronic diastolic CHF-appears euvolemic  Dose adjustments as above  Aramis Betancourt VTE Pharmacologic Prophylaxis:   Pharmacologic: none  Mechanical VTE Prophylaxis in Place: No    Discussions with Specialists or Other Care Team Provider:  Spoke with Dr Shahbaz Quijano    Education and Discussions with Family / Patient:  Spoke with wife at bedside    Time Spent for Care: 20 minutes  More than 50% of total time spent on counseling and coordination of care as described above  Current Length of Stay: 0 day(s)    Current Patient Status: Inpatient   Certification Statement: The patient will continue to require additional inpatient hospital stay due to Acute kidney injury    Discharge Plan:  Depending on renal function    Code Status: Level 1 - Full Code      Subjective:   Denies shortness of breath  No chest pain  Sandoval Dapper to go home      Objective:     Vitals:   Temp (24hrs), Av °F (36 7 °C), Min:97 8 °F (36 6 °C), Max:98 2 °F (36 8 °C)    HR:  [59-60] 59  Resp:  [20] 20  BP: (119-136)/(58-79) 119/58  SpO2:  [91 %-96 %] 91 %  Body mass index is 47 76 kg/m²  Input and Output Summary (last 24 hours): Intake/Output Summary (Last 24 hours) at 11/05/17 1833  Last data filed at 11/05/17 0900   Gross per 24 hour   Intake              960 ml   Output                0 ml   Net              960 ml       Physical Exam:     Physical Exam   Constitutional:   Morbidly obese   HENT:   Head: Normocephalic  Mouth/Throat: No oropharyngeal exudate  Eyes: No scleral icterus  Cardiovascular: Regular rhythm  No murmur heard  Pulmonary/Chest: Effort normal  No stridor  Abdominal: Soft  He exhibits no distension  There is no tenderness  Musculoskeletal:   Trace edema   Neurological: He is alert  Skin: Skin is warm  Psychiatric: He has a normal mood and affect  Additional Data:     Labs:      Results from last 7 days  Lab Units 11/04/17  0523   WBC Thousand/uL 14 45*   HEMOGLOBIN g/dL 12 5   HEMATOCRIT % 39 5   PLATELETS Thousands/uL 225       Results from last 7 days  Lab Units 11/05/17  0840   SODIUM mmol/L 138   POTASSIUM mmol/L 3 0*   CHLORIDE mmol/L 97*   CO2 mmol/L 34*   BUN mg/dL 33*   CREATININE mg/dL 2 04*   CALCIUM mg/dL 8 4   GLUCOSE RANDOM mg/dL 140           * I Have Reviewed All Lab Data Listed Above  * Additional Pertinent Lab Tests Reviewed:  All Labs Within Last 24 Hours Reviewed    Imaging:    Imaging Reports Reviewed Today Include:  No new imaging      Recent Cultures (last 7 days):           Last 24 Hours Medication List:     aspirin 81 mg Oral Daily   atorvastatin 40 mg Oral HS   baclofen 10 mg Oral TID   cholecalciferol 2,000 Units Oral Daily   clopidogrel 75 mg Oral Daily   fluticasone-salmeterol 1 puff Inhalation BID   gabapentin 800 mg Oral BID   insulin lispro 2-12 Units Subcutaneous TID AC   insulin lispro 25 Units Subcutaneous TID With Meals   isosorbide mononitrate 30 mg Oral Daily   lubiprostone 24 mcg Oral BID With Meals   memantine 10 mg Oral Daily   nebivolol 10 mg Oral Daily   pantoprazole 20 mg Oral BID AC polyethylene glycol 17 g Oral Daily   potassium chloride 40 mEq Oral TID   ranolazine 1,000 mg Oral BID   tamsulosin 0 4 mg Oral Daily With Dinner   tiZANidine 2 mg Oral TID   topiramate 100 mg Oral BID   torsemide 40 mg Oral BID (diuretic)   traZODone 100 mg Oral HS        Today, Patient Was Seen By: Latrell Ryder MD    ** Please Note: Dragon 360 Dictation voice to text software may have been used in the creation of this document   **

## 2017-11-06 PROBLEM — N18.30 ACUTE RENAL FAILURE SUPERIMPOSED ON STAGE 3 CHRONIC KIDNEY DISEASE (HCC): Status: ACTIVE | Noted: 2017-11-06

## 2017-11-06 PROBLEM — N17.9 ACUTE RENAL FAILURE SUPERIMPOSED ON STAGE 3 CHRONIC KIDNEY DISEASE (HCC): Status: ACTIVE | Noted: 2017-11-06

## 2017-11-06 PROBLEM — F11.20 OPIOID DEPENDENCE (HCC): Status: ACTIVE | Noted: 2017-11-06

## 2017-11-06 LAB
ANION GAP SERPL CALCULATED.3IONS-SCNC: 5 MMOL/L (ref 4–13)
BUN SERPL-MCNC: 25 MG/DL (ref 5–25)
CALCIUM SERPL-MCNC: 8.4 MG/DL (ref 8.3–10.1)
CHLORIDE SERPL-SCNC: 100 MMOL/L (ref 100–108)
CO2 SERPL-SCNC: 33 MMOL/L (ref 21–32)
CREAT SERPL-MCNC: 1.75 MG/DL (ref 0.6–1.3)
GFR SERPL CREATININE-BSD FRML MDRD: 43 ML/MIN/1.73SQ M
GLUCOSE SERPL-MCNC: 128 MG/DL (ref 65–140)
GLUCOSE SERPL-MCNC: 135 MG/DL (ref 65–140)
GLUCOSE SERPL-MCNC: 152 MG/DL (ref 65–140)
GLUCOSE SERPL-MCNC: 161 MG/DL (ref 65–140)
GLUCOSE SERPL-MCNC: 164 MG/DL (ref 65–140)
MAGNESIUM SERPL-MCNC: 2.7 MG/DL (ref 1.6–2.6)
POTASSIUM SERPL-SCNC: 3.6 MMOL/L (ref 3.5–5.3)
SODIUM SERPL-SCNC: 138 MMOL/L (ref 136–145)

## 2017-11-06 PROCEDURE — 83735 ASSAY OF MAGNESIUM: CPT | Performed by: INTERNAL MEDICINE

## 2017-11-06 PROCEDURE — 82948 REAGENT STRIP/BLOOD GLUCOSE: CPT

## 2017-11-06 PROCEDURE — 80048 BASIC METABOLIC PNL TOTAL CA: CPT | Performed by: INTERNAL MEDICINE

## 2017-11-06 RX ADMIN — BACLOFEN 10 MG: 10 TABLET ORAL at 18:01

## 2017-11-06 RX ADMIN — RANOLAZINE 1000 MG: 500 TABLET, FILM COATED, EXTENDED RELEASE ORAL at 17:47

## 2017-11-06 RX ADMIN — BACLOFEN 10 MG: 10 TABLET ORAL at 21:21

## 2017-11-06 RX ADMIN — OXYCODONE HYDROCHLORIDE 30 MG: 10 TABLET ORAL at 18:01

## 2017-11-06 RX ADMIN — TIZANIDINE 2 MG: 2 TABLET ORAL at 09:07

## 2017-11-06 RX ADMIN — ASPIRIN 81 MG 81 MG: 81 TABLET ORAL at 09:04

## 2017-11-06 RX ADMIN — POTASSIUM CHLORIDE 40 MEQ: 1500 TABLET, EXTENDED RELEASE ORAL at 21:20

## 2017-11-06 RX ADMIN — FLUTICASONE PROPIONATE AND SALMETEROL 1 PUFF: 50; 250 POWDER RESPIRATORY (INHALATION) at 09:06

## 2017-11-06 RX ADMIN — ISOSORBIDE MONONITRATE 30 MG: 30 TABLET, EXTENDED RELEASE ORAL at 09:03

## 2017-11-06 RX ADMIN — RANOLAZINE 1000 MG: 500 TABLET, FILM COATED, EXTENDED RELEASE ORAL at 09:04

## 2017-11-06 RX ADMIN — MORPHINE SULFATE 105 MG: 30 TABLET, EXTENDED RELEASE ORAL at 13:50

## 2017-11-06 RX ADMIN — MORPHINE SULFATE 105 MG: 30 TABLET, EXTENDED RELEASE ORAL at 21:20

## 2017-11-06 RX ADMIN — PANTOPRAZOLE SODIUM 20 MG: 20 TABLET, DELAYED RELEASE ORAL at 17:47

## 2017-11-06 RX ADMIN — MEMANTINE 10 MG: 5 TABLET ORAL at 09:07

## 2017-11-06 RX ADMIN — TOPIRAMATE 100 MG: 25 TABLET, FILM COATED ORAL at 17:46

## 2017-11-06 RX ADMIN — ATORVASTATIN CALCIUM 40 MG: 40 TABLET, FILM COATED ORAL at 21:20

## 2017-11-06 RX ADMIN — GABAPENTIN 800 MG: 400 CAPSULE ORAL at 09:04

## 2017-11-06 RX ADMIN — BACLOFEN 10 MG: 10 TABLET ORAL at 09:13

## 2017-11-06 RX ADMIN — TRAZODONE HYDROCHLORIDE 100 MG: 100 TABLET ORAL at 21:20

## 2017-11-06 RX ADMIN — INSULIN LISPRO 25 UNITS: 100 INJECTION, SOLUTION INTRAVENOUS; SUBCUTANEOUS at 09:06

## 2017-11-06 RX ADMIN — NEBIVOLOL HYDROCHLORIDE 10 MG: 10 TABLET ORAL at 09:04

## 2017-11-06 RX ADMIN — TORSEMIDE 40 MG: 20 TABLET ORAL at 09:04

## 2017-11-06 RX ADMIN — INSULIN LISPRO 25 UNITS: 100 INJECTION, SOLUTION INTRAVENOUS; SUBCUTANEOUS at 12:10

## 2017-11-06 RX ADMIN — TIZANIDINE 2 MG: 2 TABLET ORAL at 21:20

## 2017-11-06 RX ADMIN — POTASSIUM CHLORIDE 40 MEQ: 1500 TABLET, EXTENDED RELEASE ORAL at 17:47

## 2017-11-06 RX ADMIN — INSULIN GLARGINE 40 UNITS: 100 INJECTION, SOLUTION SUBCUTANEOUS at 21:21

## 2017-11-06 RX ADMIN — TIZANIDINE 2 MG: 2 TABLET ORAL at 17:49

## 2017-11-06 RX ADMIN — INSULIN LISPRO 2 UNITS: 100 INJECTION, SOLUTION INTRAVENOUS; SUBCUTANEOUS at 09:06

## 2017-11-06 RX ADMIN — GABAPENTIN 800 MG: 400 CAPSULE ORAL at 17:47

## 2017-11-06 RX ADMIN — PANTOPRAZOLE SODIUM 20 MG: 20 TABLET, DELAYED RELEASE ORAL at 06:18

## 2017-11-06 RX ADMIN — INSULIN LISPRO 25 UNITS: 100 INJECTION, SOLUTION INTRAVENOUS; SUBCUTANEOUS at 17:50

## 2017-11-06 RX ADMIN — LUBIPROSTONE 24 MCG: 24 CAPSULE, GELATIN COATED ORAL at 09:07

## 2017-11-06 RX ADMIN — VITAMIN D, TAB 1000IU (100/BT) 2000 UNITS: 25 TAB at 09:04

## 2017-11-06 RX ADMIN — ZOLPIDEM TARTRATE 10 MG: 5 TABLET ORAL at 21:29

## 2017-11-06 RX ADMIN — POTASSIUM CHLORIDE 40 MEQ: 1500 TABLET, EXTENDED RELEASE ORAL at 09:05

## 2017-11-06 RX ADMIN — LUBIPROSTONE 24 MCG: 24 CAPSULE, GELATIN COATED ORAL at 17:50

## 2017-11-06 RX ADMIN — TOPIRAMATE 100 MG: 25 TABLET, FILM COATED ORAL at 09:05

## 2017-11-06 RX ADMIN — POLYETHYLENE GLYCOL (3350) 17 G: 17 POWDER, FOR SOLUTION ORAL at 09:04

## 2017-11-06 RX ADMIN — FLUTICASONE PROPIONATE AND SALMETEROL 1 PUFF: 50; 250 POWDER RESPIRATORY (INHALATION) at 17:48

## 2017-11-06 RX ADMIN — TAMSULOSIN HYDROCHLORIDE 0.4 MG: 0.4 CAPSULE ORAL at 17:47

## 2017-11-06 RX ADMIN — OXYCODONE HYDROCHLORIDE 30 MG: 10 TABLET ORAL at 06:11

## 2017-11-06 RX ADMIN — CLOPIDOGREL BISULFATE 75 MG: 75 TABLET ORAL at 09:04

## 2017-11-06 NOTE — PROGRESS NOTES
Progress Note - Cardiology   Laura Numbers 49 y o  male MRN: [id number]  Unit/Bed#: E4 -01 Encounter: [id number]      Assessment:     1  Chronic atypical CP  2  CAD s/p PCI RCA 11/3/17  3  LENNY/CKD  4  Hypokalemia--improved  5  Ventricular ectopy--improved    Discussion/Recommendations:    · Renal fx improving as is hypokalemia  Appreciate nephrology and SLIM assistance  Con't recommendations for diuretics and pain management as their recommendations  · Will tentatively plan d/c tomorrow if renal fx continues to improve      Subjective: Pt seen/examined  Feels well, CP resolved        Physical Exam:  GEN:  NAD  HEENT:  MMM, NCAT, pink conjunctiva, EOMI, nonicteric sclera  CV:  NO JVD/HJR, RR, NO M/R/G, +S1/S2, NO PARASTERNAL HEAVE/THRILL, NO LE EDEMA, NO HEPATIC SYSTOLIC PULSATION, WARM EXTREMITIES  RESP:  CTAB/L  ABD:  SOFT, NT, NO GROSS ORGANOMEGALY        Vitals:   /66   Pulse 62   Temp 98 7 °F (37 1 °C) (Temporal)   Resp 20   Ht 6' 2" (1 88 m)   Wt (!) 169 kg (371 lb 14 7 oz)   SpO2 92%   BMI 47 75 kg/m²   Vitals:    11/03/17 0706 11/06/17 0552   Weight: (!) 169 kg (372 lb) (!) 169 kg (371 lb 14 7 oz)       Intake/Output Summary (Last 24 hours) at 11/06/17 1259  Last data filed at 11/06/17 1246   Gross per 24 hour   Intake             1580 ml   Output                0 ml   Net             1580 ml         TELEMETRY: Sr, artifact  Lab Results:    Results from last 7 days  Lab Units 11/04/17  0523   WBC Thousand/uL 14 45*   HEMOGLOBIN g/dL 12 5   HEMATOCRIT % 39 5   PLATELETS Thousands/uL 225       Results from last 7 days  Lab Units 11/06/17  1108   SODIUM mmol/L 138   POTASSIUM mmol/L 3 6   CHLORIDE mmol/L 100   CO2 mmol/L 33*   BUN mg/dL 25   CREATININE mg/dL 1 75*   CALCIUM mg/dL 8 4   GLUCOSE RANDOM mg/dL 152*       Results from last 7 days  Lab Units 11/06/17  1108   SODIUM mmol/L 138   POTASSIUM mmol/L 3 6   CHLORIDE mmol/L 100   CO2 mmol/L 33*   BUN mg/dL 25   CREATININE mg/dL 1 75*   GLUCOSE RANDOM mg/dL 152*   CALCIUM mg/dL 8 4       Results from last 7 days  Lab Units 11/04/17  0523   CHOLESTEROL mg/dL 110         Medications:    Current Facility-Administered Medications:     albuterol (PROVENTIL HFA,VENTOLIN HFA) inhaler 2 puff, 2 puff, Inhalation, Q6H PRN, Jan Santacruz PA-C    aspirin chewable tablet 81 mg, 81 mg, Oral, Daily, Jan Santacruz PA-C, 81 mg at 11/06/17 0488    atorvastatin (LIPITOR) tablet 40 mg, 40 mg, Oral, HS, Jan Santacruz PA-C, 40 mg at 11/05/17 2224    baclofen tablet 10 mg, 10 mg, Oral, TID, Jan Santacruz PA-C, 10 mg at 11/06/17 0054    cholecalciferol (VITAMIN D3) tablet 2,000 Units, 2,000 Units, Oral, Daily, Jan Santacruz PA-C, 2,000 Units at 11/06/17 8229    clopidogrel (PLAVIX) tablet 75 mg, 75 mg, Oral, Daily, Jan Santacruz PA-C, 75 mg at 11/06/17 9501    fluticasone-salmeterol (ADVAIR) 250-50 mcg/dose inhaler 1 puff, 1 puff, Inhalation, BID, Jan Santacruz PA-C, 1 puff at 11/06/17 0254    gabapentin (NEURONTIN) capsule 800 mg, 800 mg, Oral, BID, Jan Santacruz PA-C, 800 mg at 11/06/17 0904    insulin glargine (LANTUS) subcutaneous injection 40 Units, 40 Units, Subcutaneous, HS, Abhishek Hoffman MD, 40 Units at 11/05/17 2226    insulin lispro (HumaLOG) 100 units/mL subcutaneous injection 2-12 Units, 2-12 Units, Subcutaneous, TID AC, 2 Units at 11/06/17 0906 **AND** Fingerstick Glucose (POCT), , , TID AC, Jan Santacruz PA-C    insulin lispro (HumaLOG) 100 units/mL subcutaneous injection 25 Units, 25 Units, Subcutaneous, TID With Meals, Jan Santacruz PA-C, 25 Units at 11/06/17 1210    isosorbide mononitrate (IMDUR) 24 hr tablet 30 mg, 30 mg, Oral, Daily, Jan Santacruz PA-C, 30 mg at 11/06/17 2431    lubiprostone (AMITIZA) capsule 24 mcg, 24 mcg, Oral, BID With Meals, Jan Santacruz PA-C, 24 mcg at 11/06/17 0907    memantine (NAMENDA) tablet 10 mg, 10 mg, Oral, Daily, Jan Santacruz PA-C, 10 mg at 11/06/17 0907    morphine (MS CONTIN) ER tablet 105 mg, 105 mg, Oral, BID, Triston Swann DO    nebivolol (BYSTOLIC) tablet 10 mg, 10 mg, Oral, Daily, Suha Arce, PA-C, 10 mg at 11/06/17 9821    nitroglycerin (NITROSTAT) SL tablet 0 4 mg, 0 4 mg, Sublingual, Q5 Min PRN, Suha Arce, PA-C    oxyCODONE (ROXICODONE) immediate release tablet 30 mg, 30 mg, Oral, Q4H PRN, Suha Arce, PA-C, 30 mg at 11/06/17 8601    pantoprazole (PROTONIX) EC tablet 20 mg, 20 mg, Oral, BID AC, Suha Arce, PA-C, 20 mg at 11/06/17 2594    polyethylene glycol (MIRALAX) packet 17 g, 17 g, Oral, Daily, Gerriia Claude, PA-C, 17 g at 11/06/17 8464    potassium chloride (K-DUR,KLOR-CON) CR tablet 40 mEq, 40 mEq, Oral, TID, Latrell Ryder MD, 40 mEq at 11/06/17 0905    ranolazine (RANEXA) 12 hr tablet 1,000 mg, 1,000 mg, Oral, BID, Suha Arce, PA-C, 1,000 mg at 11/06/17 3509    tamsulosin (FLOMAX) capsule 0 4 mg, 0 4 mg, Oral, Daily With Rayrilavinia Reed, PA-C, 0 4 mg at 11/05/17 1717    tiZANidine (ZANAFLEX) tablet 2 mg, 2 mg, Oral, TID, Suha Arce, PA-C, 2 mg at 11/06/17 4968    topiramate (TOPAMAX) tablet 100 mg, 100 mg, Oral, BID, Suha Arce, PA-C, 100 mg at 11/06/17 3967    traZODone (DESYREL) tablet 100 mg, 100 mg, Oral, HS, Gerriia Claude, PA-C, 100 mg at 11/05/17 2226    zolpidem (AMBIEN) tablet 10 mg, 10 mg, Oral, HS PRN, Nowell Douse Spatzer, CRNP, 10 mg at 11/05/17 2227    Portions of the record may have been created with voice recognition software  Occasional wrong word or "sound a like" substitutions may have occurred due to the inherent limitations of voice recognition software  Read the chart carefully and recognize, using context, where substitutions have occurred

## 2017-11-06 NOTE — PROGRESS NOTES
[facility] Progress Note  Patient: Fanny Shabazz 49 y o  male   MRN: [id number]  PCP: Radha Sommer MD  Unit/Bed#: E4 -85 Encounter: [id number]  Date Of Visit: 11/06/17    Assessment  Principal Problem:    Coronary artery disease involving native coronary artery  Active Problems:    Hypertension    Chronic diastolic congestive heart failure (HCC)    Hypokalemia    Diabetes mellitus ([facility] )    GERD (gastroesophageal reflux disease)    Acute renal failure superimposed on stage 3 chronic kidney disease ([facility] )    Opioid dependence ([facility] )  Resolved Problems:    * No resolved hospital problems  *        Plan  1  Coronary artery disease  Status post stent  2  Continues opioid dependence  Patient has implanted morphine pump  Restart morphine SR now that renal function improved  3  Acute kidney injury on chronic kidney disease stage 3  Seen by Nephrology, recommending holding diuretics  4  Diabetes mellitus with hyperlipidemia  Continue Lantus and lispro  5  Essential hypertension  Stable on nebivolol  6  Chronic diastolic CHF  Diuretics on hold per Nephrology      VTE Prophylaxis:  Defer to primary team  Education and Discussions:   Discharge Plan:  When cleared by Nephrology  Time Spent for Care:  30 Mins  More than 50% of total time spent on counseling and coordination of care as described above    ______________________________________________________________________________    Subjective:   Patient seen and examined  No new complaints  No chest pain or shortness of breath more usual   Patient does have a morphine pump implanted in him and is supposed to be on morphine  mg b i d     Objective:   Vitals: Blood pressure 113/66, pulse 62, temperature 98 7 °F (37 1 °C), temperature source Temporal, resp  rate 20, height 6' 2" (1 88 m), weight (!) 169 kg (371 lb 14 7 oz), SpO2 92 %      Physical Exam:   General appearance: alert, appears stated age and cooperative  Head: Normocephalic, without obvious abnormality, atraumatic  Lungs: diminished breath sounds bilaterally  Heart: regular rate and rhythm  Abdomen: Soft obese nontender  Back: negative, range of motion normal  Extremities: edema +1 lower extremities  Neurologic: Grossly normal    Additional Data:   Labs:    Results from last 7 days  Lab Units 11/04/17  0523 11/03/17  0712   WBC Thousand/uL 14 45* 11 44*   HEMOGLOBIN g/dL 12 5 11 5*   HEMATOCRIT % 39 5 37 3   MCV fL 88 89   PLATELETS Thousands/uL 225 220       Results from last 7 days  Lab Units 11/06/17  1108 11/05/17  0840 11/04/17  0523 11/02/17  1010   SODIUM mmol/L 138 138 137 141   POTASSIUM mmol/L 3 6 3 0* 2 6* 3 7   CHLORIDE mmol/L 100 97* 97* 102   CO2 mmol/L 33* 34* 33* 36*   ANION GAP mmol/L 5 7 7 3*   BUN mg/dL 25 33* 27* 33*   CREATININE mg/dL 1 75* 2 04* 1 78* 1 81*   CALCIUM mg/dL 8 4 8 4 8 8 8 5   EGFR ml/min/1 73sq m 43 36 42 41   GLUCOSE RANDOM mg/dL 152* 140 122 171*       Results from last 7 days  Lab Units 11/06/17  1108   MAGNESIUM mg/dL 2 7*                    Results from last 7 days  Lab Units 11/06/17  1120 11/06/17  0834 11/05/17  2113 11/05/17  1539 11/05/17  1118 11/05/17  0916 11/04/17  2109 11/04/17  1604 11/04/17  1105 11/04/17  0702 11/03/17  2114 11/03/17  1605   POC GLUCOSE mg/dl 135 164* 140 227* 122 131 230* 247* 165* 119 116 181*             * I Have Reviewed All Lab Data Listed Above      Cultures:           Imaging:  Imaging Reports Reviewed Today Include:       Scheduled Meds:  aspirin 81 mg Oral Daily   atorvastatin 40 mg Oral HS   baclofen 10 mg Oral TID   cholecalciferol 2,000 Units Oral Daily   clopidogrel 75 mg Oral Daily   fluticasone-salmeterol 1 puff Inhalation BID   gabapentin 800 mg Oral BID   insulin glargine 40 Units Subcutaneous HS   insulin lispro 2-12 Units Subcutaneous TID AC   insulin lispro 25 Units Subcutaneous TID With Meals   isosorbide mononitrate 30 mg Oral Daily   lubiprostone 24 mcg Oral BID With Meals memantine 10 mg Oral Daily   nebivolol 10 mg Oral Daily   pantoprazole 20 mg Oral BID AC   polyethylene glycol 17 g Oral Daily   potassium chloride 40 mEq Oral TID   ranolazine 1,000 mg Oral BID   tamsulosin 0 4 mg Oral Daily With Dinner   tiZANidine 2 mg Oral TID   topiramate 100 mg Oral BID   traZODone 100 mg Oral HS     Continuous Infusions:   PRN Meds:    albuterol    nitroglycerin    oxyCODONE    zolpidem     Nathan Alesha, DO

## 2017-11-06 NOTE — CONSULTS
2           Consultation - Nephrology   Juan Hernandez 49 y o  male MRN: [id number]  Unit/Bed#: E4 -01 Encounter: [id number]      Assessment/Plan     Assessment / Plan:  1  Renal   From review of records  Patient's best creatinine that I saw was 1 3  He was admitted underwent cardiac catheterization with stent placement and creatinine was around 1 7  It subsequently increased to around 2  We were asked to see him for acute renal failure  The issue is whether not this is related to contrast ATN versus other factors as he is on large doses of diuretics  At this point time creatinine is not significantly increased over the last couple days but it is possible as from ATN as well  At this point hold diuretics as did receive morning dose to avoid volume depletion  If patient requires and of course can give IV Lasix  Monitor renal function    2  Hypokalemia  Patient is on large dose of oral torsemide at home so it is possibly may just have chronic urinary wasting as opposed another cause  At this point will replete orally as is ordered diuretic is on hold after morning dose and will follow  3   Cardiac  Patient underwent stent placement in right coronary artery relatively stable clinically  Cardiology is following  History of Present Illness   Physician Requesting Consult: Vinicio Henson MD  Reason for Consult / Principal Problem:  Acute renal failure and hypokalemia  Hx and PE limited by:   HPI: Juan Hernandez is a 49y o  year old male who presents with history of mild renal insufficiency underwent cardiac catheterization  Postprocedure the patient has been noted to be hypokalemic and creatinine has increased to around 2 we were asked to see to evaluate  Patient has been on diuretics at home  Is relatively stable today so asked to see regarding these issues  History obtained from chart review and the patient      Consults    Review of Systems   Constitutional: Negative for chills  HENT: Negative  Eyes: Negative  Respiratory: Negative for chest tightness and wheezing  Cardiovascular: Negative for chest pain and leg swelling  Gastrointestinal: Negative for abdominal distention, abdominal pain, diarrhea, nausea and vomiting  Genitourinary: Negative  Musculoskeletal: Negative  Skin: Negative  Neurological: Negative          Historical Information   Patient Active Problem List   Diagnosis    Hypertension    Type 2 diabetes mellitus with renal complication ([facility])    Chronic diastolic congestive heart failure ([place] [address] )    Coronary artery disease involving native coronary artery    Morbid obesity ([place] [address] )    CVA (cerebral vascular accident) ([place] [address] )    CHF (congestive heart failure) ([place] [address] )    Stroke ([place] [address] )    Stented coronary artery    Obstructive sleep apnea syndrome    MI (myocardial infarction)    Depression    CPAP (continuous positive airway pressure) dependence    Brain aneurysm    Hypokalemia    Alzheimer disease    Acute on chronic diastolic congestive heart failure ([facility])    Chronic pain disorder    Constipation    Coronary artery disease    Diabetes mellitus ([place] [address] )    Migraine    Sleep apnea    Acute tracheobronchitis    Dyspnea on exertion    Bilateral leg edema    Chronic respiratory failure ([facility])    Chest pain    Stage 3 chronic kidney disease    Leukocytosis    GERD (gastroesophageal reflux disease)     Past Medical History:   Diagnosis Date    Acute on chronic diastolic congestive heart failure ([facility])     Altered gait     Alzheimer disease     per patients,,early onset     Angina pectoris ([place] [address] )     Anxiety     Arthritis     Brain aneurysm     Cardiac disease     Chest pain 1/13/2016    Chronic pain     back/ right groin and rle- has morphine pump    Constipation     COPD (chronic obstructive pulmonary disease) ([facility])     Coronary artery disease     CPAP (continuous positive airway pressure) dependence     Dependent on walker for ambulation     w/c for long distance    Depression     Diabetes mellitus (HCC)     insulin dependent    Dizziness     occ    Enlarged prostate     GERD (gastroesophageal reflux disease)     Heart failure (HCC)     Hiatal hernia     Hypercholesterolemia     Hypertension     MI (myocardial infarction)     Migraine     Neuropathy     Oxygen dependent     Q HS  2LPM with CPAP and prn during day 2-3 LPM     Shortness of breath     Sleep apnea     Stented coronary artery     Stroke ([facility] [address] )     vision loss    Urinary frequency     Wears dentures     Wears glasses      Past Surgical History:   Procedure Laterality Date    BACK SURGERY      BRAIN SURGERY      CARDIAC CATHETERIZATION      with stents    CEREBRAL ANEURYSM REPAIR      with coils    COLONOSCOPY      ESOPHAGOGASTRODUODENOSCOPY N/A 7/1/2016    Procedure: ESOPHAGOGASTRODUODENOSCOPY (EGD); Surgeon: Agatha Wu MD;  [facility]; Service:     HERNIA REPAIR      HIATAL HERNIA REPAIR      KNEE ARTHROSCOPY Right     KNEE ARTHROSCOPY Right     PERONEAL NERVE DECOMPRESSION Right     HI ESOPHAGOGASTRODUODENOSCOPY TRANSORAL DIAGNOSTIC N/A 2/27/2017    Procedure: ESOPHAGOGASTRODUODENOSCOPY (EGD); Surgeon: Agatha Wu MD;  [facility];   Service: Gastroenterology    HI INSERT SPINE INFUSN [facility] N/A 1/19/2017    Procedure: REMOVAL / [place] [place];  Surgeon: Sharmila Da Silva MD;  [facility];  Service: Orthopedics    HI INSERT SPINE INFUSN [address] [place] [address] N/A 5/16/2016    Procedure: REPLACEMENT AND PROGRAM PUMP ;  Surgeon: Sharmila Da Silva MD;  [facility];  Service: Orthopedics     Social History   History   Alcohol Use No     History   Drug Use No     History   Smoking Status    Never Smoker   Smokeless Tobacco    Never Used     Family History   Problem Relation Age of Onset    Family history unknown: Yes       Meds/Allergies   current meds:   Current Facility-Administered Medications   Medication Dose Route Frequency    albuterol (PROVENTIL HFA,VENTOLIN HFA) inhaler 2 puff  2 puff Inhalation Q6H PRN    aspirin chewable tablet 81 mg  81 mg Oral Daily    atorvastatin (LIPITOR) tablet 40 mg  40 mg Oral HS    baclofen tablet 10 mg  10 mg Oral TID    cholecalciferol (VITAMIN D3) tablet 2,000 Units  2,000 Units Oral Daily    clopidogrel (PLAVIX) tablet 75 mg  75 mg Oral Daily    fluticasone-salmeterol (ADVAIR) 250-50 mcg/dose inhaler 1 puff  1 puff Inhalation BID    gabapentin (NEURONTIN) capsule 800 mg  800 mg Oral BID    insulin glargine (LANTUS) subcutaneous injection 40 Units  40 Units Subcutaneous HS    insulin lispro (HumaLOG) 100 units/mL subcutaneous injection 2-12 Units  2-12 Units Subcutaneous TID AC    insulin lispro (HumaLOG) 100 units/mL subcutaneous injection 25 Units  25 Units Subcutaneous TID With Meals    isosorbide mononitrate (IMDUR) 24 hr tablet 30 mg  30 mg Oral Daily    lubiprostone (AMITIZA) capsule 24 mcg  24 mcg Oral BID With Meals    memantine (NAMENDA) tablet 10 mg  10 mg Oral Daily    nebivolol (BYSTOLIC) tablet 10 mg  10 mg Oral Daily    nitroglycerin (NITROSTAT) SL tablet 0 4 mg  0 4 mg Sublingual Q5 Min PRN    oxyCODONE (ROXICODONE) immediate release tablet 30 mg  30 mg Oral Q4H PRN    pantoprazole (PROTONIX) EC tablet 20 mg  20 mg Oral BID AC    polyethylene glycol (MIRALAX) packet 17 g  17 g Oral Daily    potassium chloride (K-DUR,KLOR-CON) CR tablet 40 mEq  40 mEq Oral TID    ranolazine (RANEXA) 12 hr tablet 1,000 mg  1,000 mg Oral BID    tamsulosin (FLOMAX) capsule 0 4 mg  0 4 mg Oral Daily With Dinner    tiZANidine (ZANAFLEX) tablet 2 mg  2 mg Oral TID    topiramate (TOPAMAX) tablet 100 mg  100 mg Oral BID    traZODone (DESYREL) tablet 100 mg  100 mg Oral HS    zolpidem (AMBIEN) tablet 10 mg  10 mg Oral HS PRN     No Known Allergies    Objective     Intake/Output Summary (Last 24 hours) at 11/06/17 1104  Last data filed at 11/06/17 0900   Gross per 24 hour   Intake             1080 ml   Output                0 ml   Net             1080 ml       Invasive Devices:        PHYSICAL EXAM:  /60   Pulse 60   Temp 98 7 °F (37 1 °C) (Temporal)   Resp 20   Ht 6' 2" (1 88 m)   Wt (!) 169 kg (371 lb 14 7 oz)   SpO2 92%   BMI 47 75 kg/m²     Physical Exam   Constitutional: No distress  HENT:   Mouth/Throat: No oropharyngeal exudate  Eyes: No scleral icterus  Neck: Neck supple  No JVD present  Cardiovascular: Normal rate  Exam reveals no friction rub  Pulmonary/Chest: Effort normal and breath sounds normal  No respiratory distress  He has no wheezes  He has no rales  Abdominal: Soft  Bowel sounds are normal  He exhibits no distension  There is no tenderness  There is no rebound           Current Weight: Weight - Scale: (!) 169 kg (371 lb 14 7 oz)  First Weight: Weight - Scale: (!) 169 kg (372 lb)    Lab Results:      Results from last 7 days  Lab Units 11/04/17  0523   WBC Thousand/uL 14 45*   HEMOGLOBIN g/dL 12 5   HEMATOCRIT % 39 5   PLATELETS Thousands/uL 225       Results from last 7 days  Lab Units 11/05/17  0840   SODIUM mmol/L 138   POTASSIUM mmol/L 3 0*   CHLORIDE mmol/L 97*   CO2 mmol/L 34*   BUN mg/dL 33*   CREATININE mg/dL 2 04*   GLUCOSE RANDOM mg/dL 140   CALCIUM mg/dL 8 4       Results from last 7 days  Lab Units 11/05/17  0840   SODIUM mmol/L 138   POTASSIUM mmol/L 3 0*   CHLORIDE mmol/L 97*   CO2 mmol/L 34*   BUN mg/dL 33*   CREATININE mg/dL 2 04*   CALCIUM mg/dL 8 4   GLUCOSE RANDOM mg/dL 140

## 2017-11-06 NOTE — PLAN OF CARE

## 2017-11-07 VITALS
WEIGHT: 315 LBS | OXYGEN SATURATION: 92 % | HEART RATE: 61 BPM | RESPIRATION RATE: 20 BRPM | BODY MASS INDEX: 40.43 KG/M2 | DIASTOLIC BLOOD PRESSURE: 70 MMHG | TEMPERATURE: 97.9 F | HEIGHT: 74 IN | SYSTOLIC BLOOD PRESSURE: 130 MMHG

## 2017-11-07 LAB
ANION GAP SERPL CALCULATED.3IONS-SCNC: 8 MMOL/L (ref 4–13)
BUN SERPL-MCNC: 22 MG/DL (ref 5–25)
CALCIUM SERPL-MCNC: 8.8 MG/DL (ref 8.3–10.1)
CHLORIDE SERPL-SCNC: 101 MMOL/L (ref 100–108)
CO2 SERPL-SCNC: 29 MMOL/L (ref 21–32)
CREAT SERPL-MCNC: 1.71 MG/DL (ref 0.6–1.3)
GFR SERPL CREATININE-BSD FRML MDRD: 44 ML/MIN/1.73SQ M
GLUCOSE SERPL-MCNC: 120 MG/DL (ref 65–140)
GLUCOSE SERPL-MCNC: 120 MG/DL (ref 65–140)
GLUCOSE SERPL-MCNC: 122 MG/DL (ref 65–140)
GLUCOSE SERPL-MCNC: 131 MG/DL (ref 65–140)
GLUCOSE SERPL-MCNC: 166 MG/DL (ref 65–140)
POTASSIUM SERPL-SCNC: 3.6 MMOL/L (ref 3.5–5.3)
SODIUM SERPL-SCNC: 138 MMOL/L (ref 136–145)

## 2017-11-07 PROCEDURE — 82948 REAGENT STRIP/BLOOD GLUCOSE: CPT

## 2017-11-07 PROCEDURE — 80048 BASIC METABOLIC PNL TOTAL CA: CPT | Performed by: INTERNAL MEDICINE

## 2017-11-07 RX ORDER — TORSEMIDE 100 MG/1
50 TABLET ORAL 2 TIMES DAILY
Qty: 60 TABLET | Refills: 0
Start: 2017-11-07 | End: 2019-06-04 | Stop reason: ALTCHOICE

## 2017-11-07 RX ORDER — FUROSEMIDE 10 MG/ML
40 INJECTION INTRAMUSCULAR; INTRAVENOUS ONCE
Status: COMPLETED | OUTPATIENT
Start: 2017-11-07 | End: 2017-11-07

## 2017-11-07 RX ORDER — CLOPIDOGREL BISULFATE 75 MG/1
75 TABLET ORAL DAILY
Qty: 30 TABLET | Refills: 11 | Status: SHIPPED | OUTPATIENT
Start: 2017-11-07 | End: 2018-01-31

## 2017-11-07 RX ADMIN — LUBIPROSTONE 24 MCG: 24 CAPSULE, GELATIN COATED ORAL at 09:38

## 2017-11-07 RX ADMIN — RANOLAZINE 1000 MG: 500 TABLET, FILM COATED, EXTENDED RELEASE ORAL at 09:34

## 2017-11-07 RX ADMIN — TOPIRAMATE 100 MG: 25 TABLET, FILM COATED ORAL at 09:35

## 2017-11-07 RX ADMIN — BACLOFEN 10 MG: 10 TABLET ORAL at 09:34

## 2017-11-07 RX ADMIN — MEMANTINE 10 MG: 5 TABLET ORAL at 09:37

## 2017-11-07 RX ADMIN — PANTOPRAZOLE SODIUM 20 MG: 20 TABLET, DELAYED RELEASE ORAL at 06:19

## 2017-11-07 RX ADMIN — POTASSIUM CHLORIDE 40 MEQ: 1500 TABLET, EXTENDED RELEASE ORAL at 09:36

## 2017-11-07 RX ADMIN — ISOSORBIDE MONONITRATE 30 MG: 30 TABLET, EXTENDED RELEASE ORAL at 09:34

## 2017-11-07 RX ADMIN — CLOPIDOGREL BISULFATE 75 MG: 75 TABLET ORAL at 09:35

## 2017-11-07 RX ADMIN — INSULIN LISPRO 25 UNITS: 100 INJECTION, SOLUTION INTRAVENOUS; SUBCUTANEOUS at 12:31

## 2017-11-07 RX ADMIN — INSULIN LISPRO 25 UNITS: 100 INJECTION, SOLUTION INTRAVENOUS; SUBCUTANEOUS at 09:37

## 2017-11-07 RX ADMIN — GABAPENTIN 800 MG: 400 CAPSULE ORAL at 09:35

## 2017-11-07 RX ADMIN — FLUTICASONE PROPIONATE AND SALMETEROL 1 PUFF: 50; 250 POWDER RESPIRATORY (INHALATION) at 09:36

## 2017-11-07 RX ADMIN — ASPIRIN 81 MG 81 MG: 81 TABLET ORAL at 09:36

## 2017-11-07 RX ADMIN — POLYETHYLENE GLYCOL (3350) 17 G: 17 POWDER, FOR SOLUTION ORAL at 09:34

## 2017-11-07 RX ADMIN — FUROSEMIDE 40 MG: 10 INJECTION, SOLUTION INTRAMUSCULAR; INTRAVENOUS at 15:32

## 2017-11-07 RX ADMIN — NEBIVOLOL HYDROCHLORIDE 10 MG: 10 TABLET ORAL at 09:34

## 2017-11-07 RX ADMIN — TIZANIDINE 2 MG: 2 TABLET ORAL at 09:38

## 2017-11-07 RX ADMIN — VITAMIN D, TAB 1000IU (100/BT) 2000 UNITS: 25 TAB at 09:35

## 2017-11-07 RX ADMIN — MORPHINE SULFATE 105 MG: 30 TABLET, EXTENDED RELEASE ORAL at 09:35

## 2017-11-07 NOTE — PROGRESS NOTES
NEPHROLOGY PROGRESS NOTE    Cheng Tran 49 y o  male MRN: [id number]  Unit/Bed#: E4 -01 Encounter: [id number]  Reason for Consult:  Acute on chronic renal insufficiency    ASSESSMENT/PLAN:  1  Renal   Patient's best baseline creatinine that I saw was 1 3 and at this point time and increased after catheterization but I am unsure if that was due to ATN versus prerenal azotemia from diuresis  Diuretics were hold creatinine stable at 1 7  At this point time I feel the patient is stable for discharge and if there discharged I would resume his usual maintenance diuretics and follow up with Cardiology  I did speak with the primary service about this  2   Cardiac  Patient is status post cardiac stent placement  Stable clinically follow-up with cardiology  3   Hypokalemia  Levels are normal today after some aggressive repletion and my suspicion is this really due to urinary wasting from high doses of diuretic that can be followed up as an outpatient  Should continue supplementation on discharge  SUBJECTIVE:  Review of Systems   Constitution: Negative for chills and fever  HENT: Negative  Eyes: Negative  Cardiovascular: Negative for chest pain, dyspnea on exertion and orthopnea  Respiratory: Negative for cough and shortness of breath  Gastrointestinal: Negative  Genitourinary: Negative  Neurological: Negative  OBJECTIVE:  Current Weight: Weight - Scale: (!) 171 kg (376 lb 8 7 oz)  Vitals:Temp (24hrs), Av 9 °F (36 6 °C), Min:97 6 °F (36 4 °C), Max:98 2 °F (36 8 °C)  Current: Temperature: 97 6 °F (36 4 °C)   Blood pressure 115/60, pulse 61, temperature 97 6 °F (36 4 °C), temperature source Temporal, resp  rate 20, height 6' 2" (1 88 m), weight (!) 171 kg (376 lb 8 7 oz), SpO2 94 %  ,Body mass index is 48 35 kg/m²        Intake/Output Summary (Last 24 hours) at 17 1107  Last data filed at 17 0541   Gross per 24 hour   Intake              980 ml   Output 0 ml   Net              980 ml       Physical Exam: /60   Pulse 61   Temp 97 6 °F (36 4 °C) (Temporal)   Resp 20   Ht 6' 2" (1 88 m)   Wt (!) 171 kg (376 lb 8 7 oz)   SpO2 94%   BMI 48 35 kg/m²   Physical Exam   Constitutional: No distress  HENT:   Mouth/Throat: No oropharyngeal exudate  Neck: Normal range of motion  No JVD present  Cardiovascular: Normal rate  Exam reveals no friction rub  Pulmonary/Chest: Effort normal and breath sounds normal  No respiratory distress  He has no wheezes  He has no rales  Abdominal: Soft  Bowel sounds are normal  He exhibits no distension  There is no tenderness  There is no rebound         Medications:    Current Facility-Administered Medications:     albuterol (PROVENTIL HFA,VENTOLIN HFA) inhaler 2 puff, 2 puff, Inhalation, Q6H PRN, Charlie Quinonez PA-C    aspirin chewable tablet 81 mg, 81 mg, Oral, Daily, Charlieandrew Quinonez PA-C, 81 mg at 11/07/17 4141    atorvastatin (LIPITOR) tablet 40 mg, 40 mg, Oral, HS, Charlierowan Quinonez PA-C, 40 mg at 11/06/17 2120    baclofen tablet 10 mg, 10 mg, Oral, TID, CharlieANTHONY Wooten-C, 10 mg at 11/07/17 0600    cholecalciferol (VITAMIN D3) tablet 2,000 Units, 2,000 Units, Oral, Daily, Charlieandrew Quinonez PA-C, 2,000 Units at 11/07/17 0935    clopidogrel (PLAVIX) tablet 75 mg, 75 mg, Oral, Daily, CharlieANTHONY Wooten-C, 75 mg at 11/07/17 0935    fluticasone-salmeterol (ADVAIR) 250-50 mcg/dose inhaler 1 puff, 1 puff, Inhalation, BID, CharlieANTHONY Wooten-C, 1 puff at 11/07/17 5900    gabapentin (NEURONTIN) capsule 800 mg, 800 mg, Oral, BID, Charlierowan Quinonez PA-C, 800 mg at 11/07/17 0935    insulin glargine (LANTUS) subcutaneous injection 40 Units, 40 Units, Subcutaneous, HS, Nicko Rodriguez MD, 40 Units at 11/06/17 2121    insulin lispro (HumaLOG) 100 units/mL subcutaneous injection 2-12 Units, 2-12 Units, Subcutaneous, TID AC, 2 Units at 11/06/17 0906 **AND** Fingerstick Glucose (POCT), , , TID AC, Charlie Quinonez, JANNA    insulin lispro (HumaLOG) 100 units/mL subcutaneous injection 25 Units, 25 Units, Subcutaneous, TID With Meals, Akiko Kelley PA-C, 25 Units at 11/07/17 1951    isosorbide mononitrate (IMDUR) 24 hr tablet 30 mg, 30 mg, Oral, Daily, Akiko Kelley PA-C, 30 mg at 11/07/17 8119    lubiprostone (AMITIZA) capsule 24 mcg, 24 mcg, Oral, BID With Meals, Akiko Kelley PA-C, 24 mcg at 11/07/17 4926    memantine (NAMENDA) tablet 10 mg, 10 mg, Oral, Daily, Akiko Kelley PA-C, 10 mg at 11/07/17 2056    morphine (MS CONTIN) ER tablet 105 mg, 105 mg, Oral, BID, Triston Swann DO, 105 mg at 11/07/17 0935    nebivolol (BYSTOLIC) tablet 10 mg, 10 mg, Oral, Daily, Akiko Kelley PA-C, 10 mg at 11/07/17 0934    nitroglycerin (NITROSTAT) SL tablet 0 4 mg, 0 4 mg, Sublingual, Q5 Min PRN, Akiko Kelley PA-C    oxyCODONE (ROXICODONE) immediate release tablet 30 mg, 30 mg, Oral, Q4H PRN, Akiko Kelley PA-C, 30 mg at 11/06/17 1801    pantoprazole (PROTONIX) EC tablet 20 mg, 20 mg, Oral, BID AC, Akiko Kelley PA-C, 20 mg at 11/07/17 6080    polyethylene glycol (MIRALAX) packet 17 g, 17 g, Oral, Daily, ANTHONY Wilson-THIAGO, 17 g at 11/07/17 0934    potassium chloride (K-DUR,KLOR-CON) CR tablet 40 mEq, 40 mEq, Oral, TID, Rafaela Persaud MD, 40 mEq at 11/07/17 0936    ranolazine (RANEXA) 12 hr tablet 1,000 mg, 1,000 mg, Oral, BID, ANTHONY Wilson-THIAGO, 1,000 mg at 11/07/17 4487    tamsulosin (FLOMAX) capsule 0 4 mg, 0 4 mg, Oral, Daily With Bess Werner PA-C, 0 4 mg at 11/06/17 1747    tiZANidine (ZANAFLEX) tablet 2 mg, 2 mg, Oral, TID, Akiko Kelley PA-C, 2 mg at 11/07/17 5222    topiramate (TOPAMAX) tablet 100 mg, 100 mg, Oral, BID, Akiko Kelley PA-C, 100 mg at 11/07/17 0935    traZODone (DESYREL) tablet 100 mg, 100 mg, Oral, HS, Akiko Kelley PA-C, 100 mg at 11/06/17 2120    zolpidem (AMBIEN) tablet 10 mg, 10 mg, Oral, HS PRN, Lethaniel Copas Spatzer, CRNP, 10 mg at 11/06/17 2129    Laboratory Results:  Lab Results   Component Value Date    WBC 14 45 (H) 11/04/2017    HGB 12 5 11/04/2017    HCT 39 5 11/04/2017    MCV 88 11/04/2017     11/04/2017     Lab Results   Component Value Date    GLUCOSE 131 11/07/2017    CALCIUM 8 8 11/07/2017     11/07/2017    K 3 6 11/07/2017    CO2 29 11/07/2017     11/07/2017    BUN 22 11/07/2017    CREATININE 1 71 (H) 11/07/2017     Lab Results   Component Value Date    CALCIUM 8 8 11/07/2017    PHOS 3 4 01/06/2017     No results found for: LABPROT

## 2017-11-07 NOTE — PROGRESS NOTES
[facility] Progress Note  Patient: Asim Felder 49 y o  male   MRN: [id number]  PCP: Melly Schaeffer MD  Unit/Bed#: E4 -25 Encounter: [id number]  Date Of Visit: 11/07/17    Assessment  Principal Problem:    Coronary artery disease involving native coronary artery  Active Problems:    Hypertension    Chronic diastolic congestive heart failure (HCC)    Hypokalemia    Diabetes mellitus ([facility] [address] )    GERD (gastroesophageal reflux disease)    Acute renal failure superimposed on stage 3 chronic kidney disease ([facility] [address] )    Opioid dependence ([facility] )  Resolved Problems:    * No resolved hospital problems  *        Plan  1  Coronary artery disease  Status post stent  Continue DA PT  2  Acute kidney injury on chronic kidney disease stage 3  Continues to improve  Seen by Nephrology  Timing of restarting diuretics to be defer to cardiology  3  Continuous opioid dependence  Patient due for refilling of morphine pump tomorrow  Continue morphine SR  4  Essential hypertension  Stable on nebivolol  5  Diabetes mellitus with hyperlipidemia  Continues to be stable on glargine  Continue atorvastatin for dyslipidemia and gabapentin for neuropathy  VTE Prophylaxis:  Defer to primary team  Education and Discussions:  Discussed with Cardiology and Nephrology  Discharge Plan:  Medically stable for discharge  Time Spent for Care:  30 Mins  More than 50% of total time spent on counseling and coordination of care as described above    ______________________________________________________________________________    Subjective:   Patient seen and examined  Complains of weight gain from being off torsemide  No chest pain or shortness of breath  Objective:   Vitals: Blood pressure 115/60, pulse 61, temperature 97 6 °F (36 4 °C), temperature source Temporal, resp  rate 20, height 6' 2" (1 88 m), weight (!) 171 kg (376 lb 8 7 oz), SpO2 94 %    Weight (last 2 days)     Date/Time   Weight    11/07/17 0520 (!)  171 (376 55)    11/06/17 0552  (!)  169 (371 92)              Physical Exam:   General appearance: alert, appears stated age and cooperative  Head: Normocephalic, without obvious abnormality, atraumatic  Lungs: diminished breath sounds bilaterally  Heart: regular rate and rhythm  Abdomen: Soft obese nontender  Back: range of motion normal  Extremities: edema +2 lower extremities bilaterally  Neurologic: Grossly normal    Additional Data:   Labs:    Results from last 7 days  Lab Units 11/04/17  0523 11/03/17  0712   WBC Thousand/uL 14 45* 11 44*   HEMOGLOBIN g/dL 12 5 11 5*   HEMATOCRIT % 39 5 37 3   MCV fL 88 89   PLATELETS Thousands/uL 225 220       Results from last 7 days  Lab Units 11/07/17  0547 11/06/17  1108 11/05/17  0840 11/04/17  0523 11/02/17  1010   SODIUM mmol/L 138 138 138 137 141   POTASSIUM mmol/L 3 6 3 6 3 0* 2 6* 3 7   CHLORIDE mmol/L 101 100 97* 97* 102   CO2 mmol/L 29 33* 34* 33* 36*   ANION GAP mmol/L 8 5 7 7 3*   BUN mg/dL 22 25 33* 27* 33*   CREATININE mg/dL 1 71* 1 75* 2 04* 1 78* 1 81*   CALCIUM mg/dL 8 8 8 4 8 4 8 8 8 5   EGFR ml/min/1 73sq m 44 43 36 42 41   GLUCOSE RANDOM mg/dL 131 152* 140 122 171*       Results from last 7 days  Lab Units 11/06/17  1108   MAGNESIUM mg/dL 2 7*                    Results from last 7 days  Lab Units 11/07/17  1137 11/07/17  0718 11/06/17  2111 11/06/17  1559 11/06/17  1120 11/06/17  0834 11/05/17  2113 11/05/17  1539 11/05/17  1118 11/05/17  0916 11/04/17  2109 11/04/17  1604   POC GLUCOSE mg/dl 120 120 161* 128 135 164* 140 227* 122 131 230* 247*             * I Have Reviewed All Lab Data Listed Above      Cultures:           Imaging:  Imaging Reports Reviewed Today Include:       Scheduled Meds:  aspirin 81 mg Oral Daily   atorvastatin 40 mg Oral HS   baclofen 10 mg Oral TID   cholecalciferol 2,000 Units Oral Daily   clopidogrel 75 mg Oral Daily   fluticasone-salmeterol 1 puff Inhalation BID   gabapentin 800 mg Oral BID   insulin glargine 40 Units Subcutaneous HS   insulin lispro 2-12 Units Subcutaneous TID AC   insulin lispro 25 Units Subcutaneous TID With Meals   isosorbide mononitrate 30 mg Oral Daily   lubiprostone 24 mcg Oral BID With Meals   memantine 10 mg Oral Daily   morphine 105 mg Oral BID   nebivolol 10 mg Oral Daily   pantoprazole 20 mg Oral BID AC   polyethylene glycol 17 g Oral Daily   potassium chloride 40 mEq Oral TID   ranolazine 1,000 mg Oral BID   tamsulosin 0 4 mg Oral Daily With Dinner   tiZANidine 2 mg Oral TID   topiramate 100 mg Oral BID   traZODone 100 mg Oral HS     Continuous Infusions:   PRN Meds:    albuterol    nitroglycerin    oxyCODONE    zolpidem     Windy Pain, DO

## 2017-11-07 NOTE — PROGRESS NOTES
Progress Note - Cardiology   Amanuel Zacarias 49 y o  male MRN: [id number]  Unit/Bed#: E4 -01 Encounter: [id number]            Asessment/Plan:  1  CAD, status post cardiac catheterization with PCI-RCA 11/3/2017  2  CKD 3, AK I - ATN versus pre renal azotemia:  GFR continues to improve  3  Hypokalemia:  Resolved  4  Chronic pain, continues opioid dependence:  Indwelling morphine pump  5  Essential hypertension:  Currently controlled  6  Diabetes mellitus:  Blood glucose trend stable since admission with insulin glargine  Assistance of SL[facility] ([facility]) appreciated  7  Dyslipidemia:  Ongoing atorvastatin    · With improving GFR and approval of nephrology will plan discharge to home  · Will resume diuretic - torsemide 50 mg b i d  (lower than previous dose of 100 a m , 100 mid day, 50 p m )  Patient advised follow-up with PCP including chemistry within the next week  · In light of normotensive trend, LENNY, and planned resumption of diuretic will continue to withhold angiotensin receptor blocker  Readdress need/dose in the outpatient setting  · Will resume pre-hospital diabetic regimen  · Continued dual antiplatelet therapy x1 year in the aftermath of PCI/stent      Subjective:  No new complaints    Feeling overall well and asking to go home    Vitals:  Vitals:    11/06/17 0552 11/07/17 0520   Weight: (!) 169 kg (371 lb 14 7 oz) (!) 171 kg (376 lb 8 7 oz)   ,  Vitals:    11/06/17 1853 11/07/17 0017 11/07/17 0520 11/07/17 0720   BP: 104/51 109/55  115/60   Pulse: 57 65  61   Resp: 20 20  20   Temp: 98 1 °F (36 7 °C) 97 6 °F (36 4 °C)  97 6 °F (36 4 °C)   TempSrc: Temporal Temporal  Temporal   SpO2: 94% 94%  94%   Weight:   (!) 171 kg (376 lb 8 7 oz)    Height:           Exam:  General:  Appears comfortable  Heart:  Regular, distant, without murmur rub  Lungs:  Currently clear  Lower Limbs:  No pitting edema             Medications:    Current Facility-Administered Medications:    albuterol (PROVENTIL HFA,VENTOLIN HFA) inhaler 2 puff, 2 puff, Inhalation, Q6H PRN, Geraldine Gelineau, PA-C    aspirin chewable tablet 81 mg, 81 mg, Oral, Daily, Geraldine Gelineau, PA-C, 81 mg at 11/07/17 2063    atorvastatin (LIPITOR) tablet 40 mg, 40 mg, Oral, HS, Geraldine Gelineau, PA-C, 40 mg at 11/06/17 2120    baclofen tablet 10 mg, 10 mg, Oral, TID, Geraldine Gelineau, PA-C, 10 mg at 11/07/17 0416    cholecalciferol (VITAMIN D3) tablet 2,000 Units, 2,000 Units, Oral, Daily, Geraldine Gelineau, PA-C, 2,000 Units at 11/07/17 0935    clopidogrel (PLAVIX) tablet 75 mg, 75 mg, Oral, Daily, Geraldine Gelineau, PA-C, 75 mg at 11/07/17 0935    fluticasone-salmeterol (ADVAIR) 250-50 mcg/dose inhaler 1 puff, 1 puff, Inhalation, BID, Geraldine Gelineau, PA-C, 1 puff at 11/07/17 0480    gabapentin (NEURONTIN) capsule 800 mg, 800 mg, Oral, BID, Geraldine Gelineau, PA-C, 800 mg at 11/07/17 0935    insulin glargine (LANTUS) subcutaneous injection 40 Units, 40 Units, Subcutaneous, HS, Joni Rene MD, 40 Units at 11/06/17 2121    insulin lispro (HumaLOG) 100 units/mL subcutaneous injection 2-12 Units, 2-12 Units, Subcutaneous, TID AC, 2 Units at 11/06/17 0906 **AND** Fingerstick Glucose (POCT), , , TID AC, Geraldine Gelineau, PA-C    insulin lispro (HumaLOG) 100 units/mL subcutaneous injection 25 Units, 25 Units, Subcutaneous, TID With Meals, Geraldine Gelineau, PA-C, 25 Units at 11/07/17 1231    isosorbide mononitrate (IMDUR) 24 hr tablet 30 mg, 30 mg, Oral, Daily, Geraldine Gelineau, PA-C, 30 mg at 11/07/17 0870    lubiprostone (AMITIZA) capsule 24 mcg, 24 mcg, Oral, BID With Meals, Geraldine Gelneelu, PA-C, 24 mcg at 11/07/17 5292    memantine (NAMENDA) tablet 10 mg, 10 mg, Oral, Daily, Geraldine Geljanis, PA-C, 10 mg at 11/07/17 5799    morphine (MS CONTIN) ER tablet 105 mg, 105 mg, Oral, BID, Triston Swann DO, 105 mg at 11/07/17 0935    nebivolol (BYSTOLIC) tablet 10 mg, 10 mg, Oral, Daily, Geraldine Gelineau, PA-C, 10 mg at 11/07/17 0934    nitroglycerin (NITROSTAT) SL tablet 0 4 mg, 0 4 mg, Sublingual, Q5 Min PRN, Dulcie Rony, PA-C    oxyCODONE (ROXICODONE) immediate release tablet 30 mg, 30 mg, Oral, Q4H PRN, Dulcie Rony, PA-C, 30 mg at 11/06/17 1801    pantoprazole (PROTONIX) EC tablet 20 mg, 20 mg, Oral, BID AC, Dulcie Rony, PA-C, 20 mg at 11/07/17 2817    polyethylene glycol (MIRALAX) packet 17 g, 17 g, Oral, Daily, Dulcie Rony, PA-C, 17 g at 11/07/17 0934    potassium chloride (K-DUR,KLOR-CON) CR tablet 40 mEq, 40 mEq, Oral, TID, Martínez Damon MD, 40 mEq at 11/07/17 0936    ranolazine (RANEXA) 12 hr tablet 1,000 mg, 1,000 mg, Oral, BID, Dulcie Rony, PA-C, 1,000 mg at 11/07/17 0280    tamsulosin (FLOMAX) capsule 0 4 mg, 0 4 mg, Oral, Daily With Pauline Manzanilla, PA-C, 0 4 mg at 11/06/17 1747    tiZANidine (ZANAFLEX) tablet 2 mg, 2 mg, Oral, TID, Dulcie Rony, PA-C, 2 mg at 11/07/17 6241    topiramate (TOPAMAX) tablet 100 mg, 100 mg, Oral, BID, Dulcie Rony, PA-C, 100 mg at 11/07/17 0935    traZODone (DESYREL) tablet 100 mg, 100 mg, Oral, HS, Dulcie Rony, PA-C, 100 mg at 11/06/17 2120    zolpidem (AMBIEN) tablet 10 mg, 10 mg, Oral, HS PRN, Zadie Bedford Spatzer, SANTHOSHNP, 10 mg at 11/06/17 2129      Labs/Data:          Results from last 7 days  Lab Units 11/04/17  0523 11/03/17  0712   WBC Thousand/uL 14 45* 11 44*   HEMOGLOBIN g/dL 12 5 11 5*   HEMATOCRIT % 39 5 37 3   PLATELETS Thousands/uL 225 220     Results from last 7 days  Lab Units 11/07/17  0547 11/06/17  1108 11/05/17  0840   SODIUM mmol/L 138 138 138   POTASSIUM mmol/L 3 6 3 6 3 0*   CHLORIDE mmol/L 101 100 97*   CO2 mmol/L 29 33* 34*   BUN mg/dL 22 25 33*

## 2017-11-07 NOTE — PROGRESS NOTES
Pt reported to oncoming RN that he had not urinated today  Assigned RN spoke with ANTHONY Grande and reported pt's complaint  Orders were to give pt 40 of Lasik IV and discharge home when pt urinates  Pt was instructed to urinate in the urinal and report to RN and he will be discharged home  RN passed the new orders on to the oncoming RN Saye

## 2017-11-07 NOTE — PLAN OF CARE
CARDIOVASCULAR - ADULT     Maintains optimal cardiac output and hemodynamic stability Completed     Absence of cardiac dysrhythmias or at baseline rhythm Completed        DISCHARGE PLANNING - CARE MANAGEMENT     Discharge to post-acute care or home with appropriate resources Completed        Knowledge Deficit     Patient/family/caregiver demonstrates understanding of disease process, treatment plan, medications, and discharge instructions Completed        PAIN - ADULT     Verbalizes/displays adequate comfort level or baseline comfort level Completed        Potential for Falls     Patient will remain free of falls Completed        Prexisting or High Potential for Compromised Skin Integrity     Skin integrity is maintained or improved Completed        SAFETY ADULT     Maintain or return to baseline ADL function Completed     Maintain or return mobility status to optimal level Completed

## 2017-11-07 NOTE — NURSING NOTE
Pt discharged after urinary output of 400 mL and 1X unobserved urination, reviewed and agreed with previous assessments

## 2017-11-07 NOTE — DISCHARGE SUMMARY
Discharge Summary    Asim Felder 49 y o  male MRN: [id number]    Unit/Bed#: E4 -01 Encounter: [id number]      Admission Date: 11/3/2017   Discharge Date: 11/7/2017      Admitting Diagnosis: Angina pectoris ([facility] [address] ) [I20 9]  Atherosclerotic heart disease of native coronary artery without angina pectoris [I25 10]  Discharge Diagnosis: Principal Problem:    Coronary artery disease involving native coronary artery  Active Problems:    Acute renal failure superimposed on stage 3 chronic kidney disease (HCC)    Hypertension    Chronic diastolic congestive heart failure (HCC)    Hypokalemia    Diabetes mellitus ([facility] [address] )    GERD (gastroesophageal reflux disease)    Opioid dependence ([facility] )       Condition at Discharge: stable   Disposition: See After Visit Summary for discharge disposition information  Planned Readmission: No    HPI/Reason For Admission: This gentleman is a 56-year-old with a known history of CAD, previous MI in 2012 and multiple catheter based coronary interventions:  BMS-RCA (date unknown), PCI-circumflex with residual disease of the distal circumflex & mid LAD (January 2015), stent of proximal LAD and distal CFX (January 2016)  The patient had been having recent complaints of chest discomfort relieved with nitroglycerin  His symptoms were otherwise somewhat atypical usually occurring at night and in consistently with exertion  He was on optimized medical therapy including maximum dose ranolazine  Cardiac catheterization was therefore advised for definitive evaluation  He presented to the hospital in elective fashion in pursuance of the same  Hospital Course: The patient presented to the hospital in elective fashion as above  Cardiac catheterization showed a 95% stenosis of the distal LAD, the proximal circumflex had 0% stenosis at site of previous stenting, and the mid RCA had an 85% stenosis    It was this RCA lesion which was treated with an angioplasty and insertion of a drug-eluting stent with 0% residual stenosis following the procedure  The following morning the patient had hypokalemia with some increased ventricular ectopy  The following day his creatinine worsened to a peak of 2 04  He was seen in consultation by Nephrology to address the possibility of contrast related ATN versus pre-renal azotemia in the setting of high dose diuretics  The patient's diuretics and angiotensin receptor blocker were held  With this his creatinine demonstrated an improved trend  He did have some weight gain and for this reason was given a single dose of IV diuretic on the day of his hospital discharge  Following his hospitalization he will resume oral torsemide at a reduced dose (50 mg b i d )  In light of his renal dysfunction and normotensive blood pressure trend, he has been advised continued holding of his ARB until reassessment by his PCP in the outpatient setting  He was instructed to see his family physician within the next week for reassessment of his GFR, examination, and blood pressure  He has been given appointment for follow-up with his usual cardiologist, Dr Feliberto Pena, and at his request was given the contact information for Dr Ector Isaac, the nephrologist who had cared for him while hospitalized  Procedures Performed:   Orders Placed This Encounter   Procedures    Cardiac catheterization       Complications: LENNY    Other Significant Findings/Treatment:   Cardiac Catheterization  SUMMARY:      CORONARY CIRCULATION:   Distal LAD: There was a 95 % stenosis  Proximal circumflex: There was a 0 % stenosis at the site of a prior stent  Mid RCA: There was a 85 % stenosis  1ST LESION INTERVENTIONS:   A balloon angioplasty with stent and balloon angioplasty procedure was performed on the 85 % lesion in the mid RCA  Following intervention there was a 0 % residual stenosis     A Resolute Integrity Rx 3 0 x 18 drug-eluting stent was placed across the lesion and deployed at a maximum  inflation pressure of 16 marcelle  INDICATIONS:   CAD: unstable angina  Discharge Medications:  See after visit summary for reconciled discharge medications provided to patient and family  Discharge instructions/Information to patient and family:   See after visit summary for information provided to patient and family  Provisions for Follow-Up Care:  See after visit summary for information related to follow-up care and any pertinent home health orders  Discharge Statement   I had direct contact with the patient on the day of discharge  I spent 45 minutes discharging the patient  This time was spent on the day of discharge including: education, examination, paperwork  All questions were answered to patient satisfaction

## 2017-11-15 ENCOUNTER — APPOINTMENT (OUTPATIENT)
Dept: LAB | Facility: HOSPITAL | Age: 57
End: 2017-11-15
Attending: INTERNAL MEDICINE
Payer: MEDICARE

## 2017-11-15 DIAGNOSIS — R89.9 UNSPECIFIED ABNORMAL FINDING IN SPECIMENS FROM OTHER ORGANS, SYSTEMS AND TISSUES: ICD-10-CM

## 2017-11-15 LAB
ANION GAP SERPL CALCULATED.3IONS-SCNC: 5 MMOL/L (ref 4–13)
BUN SERPL-MCNC: 36 MG/DL (ref 5–25)
CALCIUM SERPL-MCNC: 8.7 MG/DL (ref 8.3–10.1)
CHLORIDE SERPL-SCNC: 102 MMOL/L (ref 100–108)
CO2 SERPL-SCNC: 32 MMOL/L (ref 21–32)
CREAT SERPL-MCNC: 1.99 MG/DL (ref 0.6–1.3)
GFR SERPL CREATININE-BSD FRML MDRD: 37 ML/MIN/1.73SQ M
GLUCOSE P FAST SERPL-MCNC: 177 MG/DL (ref 65–99)
POTASSIUM SERPL-SCNC: 2.9 MMOL/L (ref 3.5–5.3)
SODIUM SERPL-SCNC: 139 MMOL/L (ref 136–145)

## 2017-11-15 PROCEDURE — 36415 COLL VENOUS BLD VENIPUNCTURE: CPT

## 2017-11-15 PROCEDURE — 80048 BASIC METABOLIC PNL TOTAL CA: CPT

## 2017-11-16 ENCOUNTER — GENERIC CONVERSION - ENCOUNTER (OUTPATIENT)
Dept: OTHER | Facility: OTHER | Age: 57
End: 2017-11-16

## 2017-11-19 LAB
ATRIAL RATE: 61 BPM
P AXIS: 21 DEGREES
PR INTERVAL: 214 MS
QRS AXIS: -7 DEGREES
QRSD INTERVAL: 90 MS
QT INTERVAL: 440 MS
QTC INTERVAL: 442 MS
T WAVE AXIS: 26 DEGREES
VENTRICULAR RATE: 61 BPM

## 2017-11-22 ENCOUNTER — TRANSCRIBE ORDERS (OUTPATIENT)
Dept: ADMINISTRATIVE | Facility: HOSPITAL | Age: 57
End: 2017-11-22

## 2017-11-22 ENCOUNTER — APPOINTMENT (OUTPATIENT)
Dept: LAB | Facility: HOSPITAL | Age: 57
End: 2017-11-22
Payer: MEDICARE

## 2017-11-22 DIAGNOSIS — N17.9 ACUTE RENAL FAILURE, UNSPECIFIED ACUTE RENAL FAILURE TYPE (HCC): Primary | ICD-10-CM

## 2017-11-22 DIAGNOSIS — N17.9 ACUTE RENAL FAILURE, UNSPECIFIED ACUTE RENAL FAILURE TYPE (HCC): ICD-10-CM

## 2017-11-22 DIAGNOSIS — E87.6 HYPOPOTASSEMIA: ICD-10-CM

## 2017-11-22 LAB
ANION GAP SERPL CALCULATED.3IONS-SCNC: 9 MMOL/L (ref 4–13)
BUN SERPL-MCNC: 28 MG/DL (ref 5–25)
CALCIUM SERPL-MCNC: 8 MG/DL (ref 8.3–10.1)
CHLORIDE SERPL-SCNC: 101 MMOL/L (ref 100–108)
CO2 SERPL-SCNC: 31 MMOL/L (ref 21–32)
CREAT SERPL-MCNC: 1.72 MG/DL (ref 0.6–1.3)
GFR SERPL CREATININE-BSD FRML MDRD: 44 ML/MIN/1.73SQ M
GLUCOSE P FAST SERPL-MCNC: 169 MG/DL (ref 65–99)
POTASSIUM SERPL-SCNC: 3.2 MMOL/L (ref 3.5–5.3)
SODIUM SERPL-SCNC: 141 MMOL/L (ref 136–145)

## 2017-11-22 PROCEDURE — 36415 COLL VENOUS BLD VENIPUNCTURE: CPT

## 2017-11-22 PROCEDURE — 80048 BASIC METABOLIC PNL TOTAL CA: CPT

## 2017-12-28 ENCOUNTER — HOSPITAL ENCOUNTER (OUTPATIENT)
Dept: RADIOLOGY | Facility: HOSPITAL | Age: 57
Discharge: HOME | End: 2017-12-28
Attending: PSYCHIATRY & NEUROLOGY
Payer: MEDICARE

## 2018-01-10 NOTE — RESULT NOTES
Message   Negative     Verified Results  (1) TISSUE EXAM [id number] 10:44AM Corbin Warren     Test Name Result Flag Reference   LAB AP CASE REPORT (Report)     Surgical Pathology Report             Case: [id number]                   Authorizing Provider: Orquidea Alford MD      Collected:      02/27/2017 1044        Ordering Location:   [address] [place] [address]   Received:      02/27/2017 [address] [facility]                               Pathologist:      Ramonita Haney MD                               Specimens:  A) - Stomach, Nodular gastritis                                    B) - Esophagus, R/o esinophilic esophagitis, bx of proximal esophagus   LAB AP FINAL DIAGNOSIS (Report)     A  Stomach, biopsy:   - Chronic inactive gastritis with regenerative changes   - H pylori organisms are not identified on immunostain   - No intestinal metaplasia, dysplasia, or malignancy identified    B  Esophagus, proximal, biopsy:   - Squamous epithelium with reactive changes   - Features of eosinophilic esophagitis are not identified    Electronically signed by Ramonita Haney MD on 3/1/2017 at 9:11 AM   LAB AP NOTE      Immunohistochemical stains are performed with adequate controls  LAB AP SURGICAL ADDITIONAL INFORMATION (Report)     These tests were developed and their performance characteristics   determined by [facility] or [address] [place] [address]  They may not be cleared or approved by the [place]  Food and   Drug Administration  The FDA has determined that such clearance or   approval is not necessary  These tests are used for clinical purposes  They should not be regarded as investigational or for research  This   laboratory has been approved by [place] [address], designated as a high-complexity   laboratory and is qualified to perform these tests  Interpretation performed at [facility]   LAB AP GROSS DESCRIPTION (Report)     A   The specimen is received in formalin, labeled with the patient's name   and hospital number, and is designated nodular gastritis  The specimen   consists of 2 rubbery and friable, tan-brown tissue fragments measuring   from 0 1 up to 0 2 cm in greatest dimension  Entirely submitted  One   cassette  B  The specimen is received in formalin, labeled with the patient's name   and hospital number, and is designated rule out eosinophilic esophagitis   biopsy of proximal esophagus   The specimen consists of 3 rubbery   tan-brown tissue fragment measuring from 0 1 up to 0 2 cm in greatest   dimension  Entirely submitted  One cassette  Note: The estimated total formalin fixation time based upon information   provided by the submitting clinician and the standard processing schedule   is 17 75 hours      Stanislaw Matute

## 2018-01-10 NOTE — RESULT NOTES
Verified Results  (1) TISSUE EXAM [id number] 10:32AM Aretha Puckett     Test Name Result Flag Reference   LAB AP CASE REPORT (Report)     Surgical Pathology Report             Case: [id number]                   Authorizing Provider: Antonio Alaniz MD      Collected:      07/01/2016 1032        Ordering Location:   [address] [place] [address]   Received:      07/01/2016 [address] [facility]                               Pathologist:      Matt Oliver MD                                 Specimens:  A) - Stomach, Rule out H Pylori                                    B) - Esophagus, Rule out Eosinophilic Esophagitis   LAB AP FINAL DIAGNOSIS (Report)     A  Stomach, gastritis (biopsy):    - Focally active chronic gastritis involving antral and oxyntic mucosa  - Immunostain for H  pylori (with appropriate positive control) is   negative  - No intestinal metaplasia, dysplasia or neoplasia identified  B  Esophagus (biopsy):    - Squamous mucosa with no significant pathologic abnormality      - Eosinophils number less than 2 per HPF      - No glandular mucosa present for evaluation      - No dysplasia or neoplasia identified  Electronically signed by Matt Oliver MD on 7/5/2016 at 2:48 PM   LAB AP NOTE      Interpretation performed at , [address]   LAB AP SURGICAL ADDITIONAL INFORMATION (Report)     These tests were developed and their performance characteristics   determined by [facility] or [employer]  They may not be cleared or approved by the [place]  Food and   Drug Administration  The FDA has determined that such clearance or   approval is not necessary  These tests are used for clinical purposes  They should not be regarded as investigational or for research  This   laboratory has been approved by CLIA 88, designated as a high-complexity   laboratory and is qualified to perform these tests     LAB AP CAROL ANN DESCRIPTION (Report)     A  The specimen is received in formalin, labeled with the patient's name   and hospital number, and is designated stomach gastritis rule out H    pylori  The specimen consists of a single tan red soft tissue fragment   measuring 0 6 cm  Entirely submitted  One cassette  B  The specimen is received in formalin, labeled with the patient's name   and hospital number, and is designated esophagus rule out eosinophilic   esophagitis  The specimen consists of 2 white-tan red soft tissue   fragments measuring 0 2 and 0 4 cm  Entirely submitted  One cassette  Note: The estimated total formalin fixation time based upon information   provided by the submitting clinician and the standard processing schedule   is 18 0 hours  [facility]   LAB AP CLINICAL INFORMATION      Gastritis, r/o H  pylori  Epigastric pain  ??? Dysphagia     Gastritis, r/o h pylori

## 2018-01-11 NOTE — RESULT NOTES
Verified Results  [facility] Abel Laurent SPEECH [id number] 01:27PM Archie Comfort Order Number: [id number]     Test Name Result Flag Reference   FL BARIUM SWALLOW VIDEO W SPEECH (Report)     VIDEO BARIUM SWALLOW     HISTORY: Difficulty swallowing  COMPARISON: None     FLUOROSCOPY TIME: 0 2 minutes     IMAGES: 273       IMPRESSION:   FINDINGS/IMPRESSION:     Video barium swallow was performed by the department of speech pathology utilizing food substances of various thickness  Please refer to the speech pathology report for further details         Workstation performed: [id number]     Signed by:   Hernandez Garcia MD   6/7/16   355       Plan  Dysphagia    · *1 - [facility]  Consult   Status: Active  Requested for: 58LJN5357  Care Summary provided  : Yes

## 2018-01-11 NOTE — MISCELLANEOUS
History of Present Illness    The patient is being contacted for follow-up after hospitalization  The date of discharge was 6/8/17  He has a high risk for readmission  Spoke with patient's family  HF Additional Notes:   Patient at [address] [place] [address] [address] at present  Per wife the patient was following medical plan for HF  Current Meds   1  Advair Diskus 250-50 MCG/DOSE Inhalation Aerosol Powder Breath Activated; Therapy: 72CAU4294 to Recorded   2  Amitiza 24 MCG Oral Capsule; TAKE ONE CAPSULE BY MOUTH TWICE A DAY; Therapy: 88SLY5820 to (Evaluate:74Old1878)  Requested for: 66VNU7250; Last   Rx:26Nov2014 Ordered   3  AmLODIPine Besylate 2 5 MG Oral Tablet; TAKE 1 TABLET DAILY; Therapy: 94PSL7977 to (21 )  Requested for: 21Qhf2910; Last   Rx:31Ywm0788 Ordered   4  Aspirin 81 MG TABS; TAKE 1 TABLET DAILY; Therapy: 81WCP7035 to (Evaluate:03Yze6434) Recorded   5  Atorvastatin Calcium 40 MG Oral Tablet; TAKE 1 TABLET AT BEDTIME; Therapy: 53WCK4239 to (61 23 68)  Requested for: 64PIR3004; Last   Rx:42Zts6827 Ordered   6  Baclofen 10 MG Oral Tablet; TAKE 1 TABLET 3 times daily; Therapy: 79KFF6821 to (Evaluate:10Mar2015)  Requested for: 32Dbc5708; Last   Rx:08Joh0726 Ordered   7  Bystolic 10 MG Oral Tablet; take 1 tablet by mouth daily; Therapy: 23WTR9272 to (Evaluate:13Gtb6031)  Requested for: 13QXE4485; Last   Rx:15Mar2017 Ordered   8  Citalopram Hydrobromide 40 MG Oral Tablet; TAKE 1 TABLET BY MOUTH EVERY DAY; Therapy: 45AXO6964 to (Hero Thomas)  Requested for: 77LPT4711; Last   Rx:26Nov2014 Ordered   9  Clopidogrel Bisulfate 75 MG Oral Tablet (Plavix); take 1 tablet by mouth every day; Therapy: 27XXM8750 to (Evaluate:50Cby4270)  Requested for: 59JJV7845; Last   Rx:19Ojv4592 Ordered   10  Flomax 0 4 MG Oral Capsule (Tamsulosin HCl); TAKE 1 CAPSULE Daily; Therapy: 72RBS7064 to Recorded   11  Gabapentin 300 MG Oral Capsule; one in am and two at bedtime;     Therapy: 69Omu2024 to (Last Rx:48Kaq5788)  Requested for: 03Zxj2038 Ordered   12  Memantine HCl - 10 MG Oral Tablet; one tab in am for 10 days then twice a day afterthat; Therapy: 82UKR7093 to (Last Rx:06Jun2017)  Requested for: 06Jun2017 Ordered   13  MS Contin 100 MG Oral Tablet Extended Release (Morphine Sulfate ER); i po tid; Therapy: 36MUX1673 to Recorded   14  Nitrostat 0 4 MG Sublingual Tablet Sublingual (Nitroglycerin); TAKE 1 TABLET    SUBLINGUALLY AS NEEDED; Therapy: 06PJI8864 to (Evaluate:80Qhm2088)  Requested for: 52GLP1305; Last    Rx:90Whz9700 Ordered   15  NovoLOG FlexPen 100 UNIT/ML Subcutaneous Solution Pen-injector; INJECT 20 UNIT 3    times daily before meals Recorded   16  Omeprazole 20 MG Oral Tablet Delayed Release; 1 PO BID; Therapy: 59FWO1117 to (Last Rx:06Rzm0704)  Requested for: 80LVT7668 Ordered   17  OxyCODONE HCl - 30 MG Oral Tablet; Therapy: 78OGB6056 to (Last Rx:02Apr2011)  Requested for: 02Apr2011 Ordered   18  Polyethylene Glycol 3350 Oral Powder; MIX 1 CAPFUL (17GM) IN 8 OZ OF WATER,JUICE    OR TEA AND DRINK DAILY; Therapy: 50AGD4583 to (Evaluate:77Syd8902)  Requested for: 35UHP4028; Last    Rx:26Nov2014 Ordered   19  Potassium Chloride ER 10 MEQ Oral Capsule Extended Release; TAKE 1 CAPSULE    TWICE DAILY WITH MEALS; Therapy: 19BNC9479 to [phone])  Requested for: 26Apr2017; Last    Rx:26Apr2017 Ordered   20  Qvar 80 MCG/ACT Inhalation Aerosol Solution; INHALE 1 PUFF TWICE DAILY; Therapy: 27FNH8534 to (Last Rx:26Mar2016)  Requested for: 60ZXJ4715 Ordered   21  Ranexa 1000 MG Oral Tablet Extended Release 12 Hour; take 1 tablet every 12 hours; Therapy: 84EML2835 to (Evaluate:51Lsa3500)  Requested for: 17JIR5127; Last    Rx:15Mar2017 Ordered   22  Topiramate 100 MG Oral Tablet; TAKE 1 TABLET IN AM and Two at bedtime; Therapy: 46GQO3493 to (Evaluate:01Jun2018)  Requested for: 45ZVV5268; Last    FW:50FRH0334 Ordered   23  Torsemide 100 MG Oral Tablet;  One po BID; Therapy: 75Wzm3601 to (Remi Plants) Recorded   24  Toujeo SoloStar 300 UNIT/ML Subcutaneous Solution Pen-injector; 75 Units SQ QHS; Therapy: 06RWV0955 to (IENKPAIA:18FZJ3376)  Requested for: 72Lsg6934; Last    Rx:66Bnw8990 Ordered   25  TraZODone HCl - 100 MG Oral Tablet; TAKE 1-3 TABLETS AT BEDTIME AS NEEDED FOR    DIFFICULTY SLEEPING; Therapy: 51ZGL7778 to (Evaluate:08Jef4085)  Requested for: 32BCI0899; Last    Rx:26Nov2014 Ordered   26  Vitamin D (Ergocalciferol) 48453 UNIT Oral Capsule; TAKE 1 CAPSULE WEEKLY; Therapy: 81XHT8491 to (Evaluate:06Apr2017)  Requested for: 50QSL6733; Last    Rx:09Mar2017 Ordered   27  Vitamin D3 2000 UNIT Oral Capsule; TAKE 2 CAPSULE BY MOUTH DAILY; Therapy: 12EYX4313 to (Last Rx:06Jun2017)  Requested for: 06Jun2017 Ordered   28  Zolpidem Tartrate 10 MG Oral Tablet; TAKE 1 TABLET AT BEDTIME AS NEEDED FOR    SLEEP; Therapy: 96MCJ5749 to (Evaluate:19Nov2014)  Requested for: 81ZDU1033; Last    HX:34RCD6325; Status: ACTIVE - Renewal Voided Ordered    Future Appointments    Date/Time Provider Specialty Site   10/31/2017 11:40 AM SALVADOR Petersen  Cardiology  CARDIOLOGY  [place]   07/19/2017 03:30 PM Flako Nava MD Neurology [facility] NEUROLOGY [facility]   01/22/2018 09:30 AM Flako Nava MD Neurology [facility]   10/12/2017 11:00 AM Abe Varela [facility] Neurology [facility]   06/27/2017 09:00 AM Nadia Julien [facility] Gastroenterology Adult [facility] GASTROENTEROLOGY  [place]     Allergies    1  No Known Drug Allergies    2  No Known Environmental Allergies   3   No Known Food Allergies    Signatures   Electronically signed by : Evgeny Rock, ; Jun 12 2017  2:31PM EST                       (Author)

## 2018-01-12 VITALS
HEIGHT: 74 IN | RESPIRATION RATE: 18 BRPM | BODY MASS INDEX: 40.43 KG/M2 | WEIGHT: 315 LBS | SYSTOLIC BLOOD PRESSURE: 98 MMHG | DIASTOLIC BLOOD PRESSURE: 68 MMHG | HEART RATE: 54 BPM

## 2018-01-12 VITALS
HEART RATE: 76 BPM | WEIGHT: 315 LBS | BODY MASS INDEX: 40.43 KG/M2 | SYSTOLIC BLOOD PRESSURE: 118 MMHG | RESPIRATION RATE: 18 BRPM | HEIGHT: 74 IN | DIASTOLIC BLOOD PRESSURE: 56 MMHG

## 2018-01-12 NOTE — RESULT NOTES
Verified Results  (1) BASIC METABOLIC PROFILE 04KZG8624 10:10AM Dena Alvarez Order Number: [id number]     Test Name Result Flag Reference   GLUCOSE,RANDM 171 mg/dL H    If the patient is fasting, the ADA then defines impaired fasting glucose as > 100 mg/dL and diabetes as > or equal to 123 mg/dL  Specimen collection should occur prior to Sulfasalazine administration due to the potential for falsely depressed results  Specimen collection should occur prior to Sulfapyridine administration due to the potential for falsely elevated results  SODIUM 141 mmol/L  136-145   POTASSIUM 3 7 mmol/L  3 5-5 3   CHLORIDE 102 mmol/L  100-108   CARBON DIOXIDE 36 mmol/L H 21-32   ANION GAP (CALC) 3 mmol/L L 4-13   BLOOD UREA NITROGEN 33 mg/dL H 5-25   CREATININE 1 81 mg/dL H 0 60-1 30   Standardized to IDMS reference method   CALCIUM 8 5 mg/dL  8 3-10 1   eGFR 41 ml/min/1 73sq m     [facility] Disease Education Program recommendations are as follows:  GFR calculation is accurate only with a steady state creatinine  Chronic Kidney disease less than 60 ml/min/1 73 sq  meters  Kidney failure less than 15 ml/min/1 73 sq  meters

## 2018-01-12 NOTE — MISCELLANEOUS
Message  Telephone call from Neurology office per Neurology patient had an office visit today for f/u for migraines c/o intermittent chest pain over the past week  As per Neurology patient stated to them today that he had taken (1) sl ntg for chest pain prior to office appointment today with relief  Reviewed with Dr Merlinda Ports patient will be seen in the office this afternoon by Dr Merlinda Ports and will review patients present symptoms  Neurology understands and verbalizes same  Active Problems    1  Abdominal pain (789 00) (R10 9)   2  Angina pectoris (413 9) (I20 9)   3  Arteriosclerosis of coronary artery (414 00) (I25 10)   4  Ascites (789 59) (R18 8)   5  Atherosclerosis (440 9) (I70 90)   6  Atherosclerosis of arteries of extremities (440 20) (I70 209)   7  Benign essential hypertension (401 1) (I10)   8  Benign prostatic hyperplasia (BPH) with post-void dribbling (600 01,788 35)   (N40 1,N39 43)   9  Brain aneurysm (437 3) (I67 1)   10  Breast pain (611 71) (N64 4)   11  Bronchitis (490) (J40)   12  Chest pain (786 50) (R07 9)   13  Chronic constipation (564 00) (K59 09)   14  Chronic diastolic CHF (congestive heart failure) (428 32,428 0) (I50 32)   15  Chronic kidney disease, stage 3 (585 3) (N18 3)   16  Chronic migraine without aura (346 70) (G43 709)   17  CVA (cerebral vascular accident) (434 91) (I63 9)   18  Depression (311) (F32 9)   19  Dysphagia (787 20) (R13 10)   20  Edema (782 3) (R60 9)   21  Epigastric pain (789 06) (R10 13)   22  GERD without esophagitis (530 81) (K21 9)   23  Hiatal hernia (553 3) (K44 9)   24  History of aneurysm (V12 59) (Z86 79)   25  History of CVA (cerebrovascular accident) (V12 54) (Z86 73)   32  Hypercholesterolemia (272 0) (E78 00)   27  Hypokalemia (276 8) (E87 6)   28  Insomnia (780 52) (G47 00)   29  Joint pain, knee (719 46) (M25 569)   30  Lumbago (724 2) (M54 5)   31  Medication overuse headache (339 3) (G44 40)   32  Memory disturbance (780 93) (R41 3)   33  Morbid or severe obesity due to excess calories (278 01) (E66 01)   34  Need for immunization against influenza (V04 81) (Z23)   35  Obstructive sleep apnea (327 23) (G47 33)   36  Other chronic pain (338 29) (G89 29)   37  Peripheral neuropathy (356 9) (G62 9)   38  Preop examination (V72 84) (Z01 818)   39  Preoperative cardiovascular examination (V72 81) (Z01 810)   40  Shortness of breath (786 05) (R06 02)   41  Stabbing headache (339 85) (G44 85)   42  Status post cardiac catheterization (V45 89) (Z98 890)   43  Tear of medial meniscus of right knee (836 0) (S83 241A)   44  Uncontrolled type 2 diabetes mellitus, with long-term current use of insulin    (250 02,V58 67) (E11 65,Z79 4)   45  Venous Intermittent Claudication (453 89)   46  Vitamin D deficiency (268 9) (E55 9)   47  Wheezing on auscultation (786 07) (R06 2)    Current Meds   1  Advair Diskus 250-50 MCG/DOSE Inhalation Aerosol Powder Breath Activated; Therapy: 03GMW1179 to Recorded   2  Aldactone 25 MG Oral Tablet (Spironolactone); Take 1 tablet daily; Therapy: 85Rue5377 to Recorded   3  Amitiza 24 MCG Oral Capsule; TAKE ONE CAPSULE BY MOUTH TWICE A DAY; Therapy: 68XDL9820 to (Evaluate:08Wvu2224)  Requested for: 34TRR9640; Last   Rx:26Nov2014 Ordered   4  AmLODIPine Besylate 2 5 MG Oral Tablet; TAKE 1 TABLET DAILY; Therapy: 74MCW6853 to (Agustin Davis)  Requested for: 26SNT1166; Last   Rx:03Oct2017 Ordered   5  Aspirin 81 MG TABS; TAKE 1 TABLET DAILY; Therapy: 11RSG0370 to (Evaluate:97Jiw4214) Recorded   6  Atorvastatin Calcium 40 MG Oral Tablet; TAKE 1 TABLET AT BEDTIME; Therapy: 45MOX5888 to (054 414 89 71)  Requested for: 76ORL1842; Last   Rx:43Drj5485 Ordered   7  Baclofen 10 MG Oral Tablet; TAKE 1 TABLET 3 times daily; Therapy: 29RBK4126 to (Evaluate:10Mar2015)  Requested for: 54Cqe3794; Last   Rx:53Vwl6959 Ordered   8  Bystolic 10 MG Oral Tablet; take 1 tablet by mouth daily;    Therapy: 13ZKW2288 to (Evaluate:11Sep2017) Requested for: 88PKI9170; Last   Rx:59Drs8197 Ordered   9  Citalopram Hydrobromide 40 MG Oral Tablet; TAKE 1 TABLET BY MOUTH EVERY DAY; Therapy: 73OHM3129 to (Larry Needle)  Requested for: 29DUD9005; Last   Rx:20Lro1791 Ordered   10  Clopidogrel Bisulfate 75 MG Oral Tablet (Plavix); take 1 tablet by mouth every day; Therapy: 27NPH3269 to (Evaluate:11Vyk9645)  Requested for: 88CDO1208; Last    Rx:05Kps4787 Ordered   11  Dicyclomine HCl - 10 MG Oral Capsule; TAKE 1 CAPSULE EVERY 6 HOURS AS    NEEDED; Therapy: 39OFY5372 to (Evaluate:45Xbo1583)  Requested for: 67NYW0790; Last    KE:31OQK9990 Ordered   12  Flomax 0 4 MG Oral Capsule (Tamsulosin HCl); TAKE 1 CAPSULE Daily; Therapy: 20MLP0619 to Recorded   13  Gabapentin 800 MG Oral Tablet; Take 2 tablets daily as directed; Therapy: 26Oko3756 to Recorded   14  Jardiance 25 MG Oral Tablet; 1 Tab daily; Therapy: 78WHV8636 to (Evaluate:06Oct2018); Last Rx:87Vvs8850 Ordered   15  Memantine HCl - 10 MG Oral Tablet; one tab in am for 10 days then twice a day afterthat; Therapy: 64WYR2387 to (Last Rx:06Jun2017)  Requested for: 06Jun2017 Ordered   16  MetOLazone 5 MG Oral Tablet; 1 tablet weekly; Therapy: 48Hpe2650 to Recorded   17  MS Contin 100 MG Oral Tablet Extended Release (Morphine Sulfate ER); 1 TABLET    TWICE DAILY; Therapy: 73RJT9171 to Recorded   18  Nitrostat 0 4 MG Sublingual Tablet Sublingual (Nitroglycerin); TAKE 1 TABLET    SUBLINGUALLY AS NEEDED; Therapy: 95JTB8926 to (Evaluate:47Mmi0681)  Requested for: 66OGT2449; Last    Rx:37Cpo4833 Ordered   19  NovoLOG FlexPen 100 UNIT/ML Subcutaneous Solution Pen-injector; 25 units with    breakfast and lunch and 30 units before dinner  Requested for: 27Jun2017;    Last JL:12XBN6621 Ordered   20  Omeprazole 20 MG Oral Tablet Delayed Release; 1 PO BID; Therapy: 91HAP0462 to (Last Rx:11Zjf8641)  Requested for: 75Tan8407 Ordered   21  OxyCODONE HCl - 30 MG Oral Tablet;     Therapy: 89GPD0736 to (Last Rx:2011)  Requested for: 82Wmt6173 Ordered   22  Potassium Chloride ER 20 MEQ Oral Tablet Extended Release; 1 tablet 3 times daily; Therapy: 69Wql5575 to Recorded   23  Qvar 80 MCG/ACT Inhalation Aerosol Solution; INHALE 1 PUFF TWICE DAILY; Therapy: 89DNH2739 to (Last Rx:2016)  Requested for: 85EAC4792 Ordered   24  Ranexa 1000 MG Oral Tablet Extended Release 12 Hour; take 1 tablet every 12 hours; Therapy: 57PWH3355 to (Evaluate:65Fib7508)  Requested for: 30SFS5518; Last    Rx:2017 Ordered   25  Topiramate 100 MG Oral Tablet; TAKE 1 TABLET IN AM and Two at bedtime; Therapy: 09AKG4276 to (Evaluate:2018)  Requested for: 38JDQ2298; Last    EN:73CFJ2950 Ordered   26  Torsemide 100 MG Oral Tablet; 2 5 tablets daily in divided dosages; Therapy: 43Sgt3270 to (Evaluate:2018)  Requested for: 09Lvg3703; Last    Rx:2017 Ordered   27  TraZODone HCl - 100 MG Oral Tablet; TAKE 1-3 TABLETS AT BEDTIME AS NEEDED    FOR DIFFICULTY SLEEPING; Therapy: 18OSV8969 to (Evaluate:06Krw8012)  Requested for: 75RWT7966; Last    Rx:2014 Ordered   28  Tenisha Eileen FlexTouch 200 UNIT/ML Subcutaneous Solution Pen-injector; 110 units daily at    bedtime; Therapy: 70ANV2576 to (Evaluate:93Vue0881)  Requested for: 72IID6272; Last    VI:94BWS0427 Ordered   29  Vitamin D3 5000 UNIT Oral Capsule; 1 Cap daily; Therapy: 40WVS6909 to (Orlando Dalton)  Requested for: 16UFW6146; Last    Rx:2017 Ordered   27  Zolpidem Tartrate 10 MG Oral Tablet; TAKE 1 TABLET AT BEDTIME AS NEEDED FOR    SLEEP; Therapy: 96EQH3681 to (Evaluate:2014)  Requested for: 35QYT1654; Last    E95JFY0161; Status: ACTIVE - Renewal Voided Ordered    Allergies    1  No Known Drug Allergies    2  No Known Environmental Allergies   3   No Known Food Allergies    Signatures   Electronically signed by : Chas Ferrera OM; Oct 12 2017  1:27PM EST                       (Administrative)

## 2018-01-12 NOTE — MISCELLANEOUS
History of Present Illness    The patient is being contacted for follow-up after hospitalization  This was not a readmission  The date of discharge was 9/10/17  He has a moderate risk for readmission  Spoke with the patient  He was discharged to home  The patient's discharge weight was 365  The patient's weight today is 362  Patient has follow-up appointment(s) on:   saw Dr Homero Barroso yesterday, states is seeing a urologist for his urinary retention and a nutritionist as well  Patient is experiencing the following symptoms:  denies acute symptoms  The patient is currently asymptomatic  Knowledge Assessment/Teachback Questions: the patient is following diet, foods to limit/avoid, the patient is obtaining daily weights, he knows the name of his medications/water pill and the patient knows when to report symptoms   needs reinforcement  Counseling was provided to the patient  Topics counseled included diet, need for daily weights, importance of compliance with treatment, symptoms to report and low sodium food labels  [facility] Additional Notes:   States is trying to be more complaint with diet  WIfe is going to help  Current Meds   1  Advair Diskus 250-50 MCG/DOSE Inhalation Aerosol Powder Breath Activated; Therapy: 31ZMC9381 to Recorded   2  Aldactone 25 MG Oral Tablet (Spironolactone); Take 1 tablet daily; Therapy: 44Tvi8465 to Recorded   3  Amitiza 24 MCG Oral Capsule; TAKE ONE CAPSULE BY MOUTH TWICE A DAY; Therapy: 93WBM5004 to (Evaluate:32Umj1481)  Requested for: 01XCM2087; Last   Rx:26Nov2014 Ordered   4  Aspirin 81 MG TABS; TAKE 1 TABLET DAILY; Therapy: 62HCM3682 to (Evaluate:89Ccr6309) Recorded   5  Atorvastatin Calcium 40 MG Oral Tablet; TAKE 1 TABLET AT BEDTIME; Therapy: 14KZH3283 to [phone]  Requested for: 43YIM7206; Last   Rx:12Tdn0290 Ordered   6  Baclofen 10 MG Oral Tablet; TAKE 1 TABLET 3 times daily;    Therapy: 06ROV7328 to (Evaluate:10Mar2015)  Requested for: 37KZI6072; Last   Rx:65Uaw7135 Ordered   7  Bystolic 10 MG Oral Tablet; take 1 tablet by mouth daily; Therapy: 14MAY4391 to (Evaluate:09Baz5171)  Requested for: 22UIA0541; Last   Rx:79Lzm6612 Ordered   8  Citalopram Hydrobromide 40 MG Oral Tablet; TAKE 1 TABLET BY MOUTH EVERY DAY; Therapy: 04AGA1214 to (Shara Loud)  Requested for: 89XKQ4400; Last   Rx:84Huo3991 Ordered   9  Clopidogrel Bisulfate 75 MG Oral Tablet (Plavix); take 1 tablet by mouth every day; Therapy: 23MTT9428 to (Evaluate:85Qvm6324)  Requested for: 96BWT9548; Last   Rx:30Pee0315 Ordered   10  Dicyclomine HCl - 10 MG Oral Capsule; TAKE 1 CAPSULE EVERY 6 HOURS AS    NEEDED; Therapy: 07LJF5442 to (Evaluate:61Xed2941)  Requested for: 04ALF5334; Last    HH:02UFI9311 Ordered   11  Flomax 0 4 MG Oral Capsule (Tamsulosin HCl); TAKE 1 CAPSULE Daily; Therapy: 45TFY3205 to Recorded   12  Gabapentin 800 MG Oral Tablet; Take 2 tablets daily as directed; Therapy: 05Pbt6372 to Recorded   13  Jardiance 10 MG Oral Tablet; 1 Tab daily; Therapy: 93CXR2207 to (Evaluate:57Oaz3657)  Requested for: 64ETE0204; Last    XP:99ZDN2775 Ordered   14  Memantine HCl - 10 MG Oral Tablet; one tab in am for 10 days then twice a day afterthat; Therapy: 06KTV9291 to (Last Rx:06Jun2017)  Requested for: 06Jun2017 Ordered   15  MetOLazone 5 MG Oral Tablet; 1 tablet weekly; Therapy: 24Uxx7771 to Recorded   16  MS Contin 100 MG Oral Tablet Extended Release (Morphine Sulfate ER); 1 TABLET    TWICE DAILY; Therapy: 28EPJ1805 to Recorded   17  Nitrostat 0 4 MG Sublingual Tablet Sublingual (Nitroglycerin); TAKE 1 TABLET    SUBLINGUALLY AS NEEDED; Therapy: 98YDH5134 to (Evaluate:45Otb0903)  Requested for: 11JVE5917; Last    Rx:07Omf2385 Ordered   18  NovoLOG FlexPen 100 UNIT/ML Subcutaneous Solution Pen-injector; 25 units with    breakfast and lunch and 30 units before dinner  Requested for: 27Jun2017;    Last Rx:27Jun2017 Ordered   19   Omeprazole 20 MG Oral Tablet Delayed Release; 1 PO BID; Therapy: 10WBB5888 to (Last Rx:84Blq5144)  Requested for: 01Rut5458 Ordered   20  OxyCODONE HCl - 30 MG Oral Tablet; Therapy: 59MAM7438 to (Last Rx:02Apr2011)  Requested for: 12Azc0551 Ordered   21  Potassium Chloride ER 20 MEQ Oral Tablet Extended Release; 1 tablet 3 times daily; Therapy: 82Pki1534 to Recorded   22  Qvar 80 MCG/ACT Inhalation Aerosol Solution; INHALE 1 PUFF TWICE DAILY; Therapy: 60DYV1515 to (Last Rx:26Mar2016)  Requested for: 95UHO3471 Ordered   23  Ranexa 1000 MG Oral Tablet Extended Release 12 Hour; take 1 tablet every 12 hours; Therapy: 46BXI7105 to (Evaluate:70Otb9911)  Requested for: 00ANL3386; Last    Rx:15Mar2017 Ordered   24  Topiramate 100 MG Oral Tablet; TAKE 1 TABLET IN AM and Two at bedtime; Therapy: 24KSZ1407 to (Evaluate:01Jun2018)  Requested for: 74ZXP8087; Last    RB:73ZZN0709 Ordered   25  Torsemide 100 MG Oral Tablet; 2 5 tablets daily in divided dosages; Therapy: 29Lur7478 to (Evaluate:11Mar2018)  Requested for: 06Rfp1159; Last    Rx:12Sep2017 Ordered   26  TraZODone HCl - 100 MG Oral Tablet; TAKE 1-3 TABLETS AT BEDTIME AS NEEDED FOR    DIFFICULTY SLEEPING; Therapy: 37INK7426 to (Evaluate:94Qyt8501)  Requested for: 39LWA9081; Last    Rx:26Nov2014 Ordered   27  Diane [place] FlexTouch 200 UNIT/ML Subcutaneous Solution Pen-injector; 110 units daily at    bedtime; Therapy: 35BYI4257 to (Evaluate:40Dwf8815)  Requested for: 13TZP3611; Last    BD:99HGS9980 Ordered   28  Vitamin D (Ergocalciferol) 08356 UNIT Oral Capsule; TAKE 1 CAPSULE WEEKLY FOR    12 WEEKS; Therapy: 69GTP9816 to (Last Rx:16Jun2017)  Requested for: 87IXU5356 Ordered   29  Vitamin D3 5000 UNIT Oral Capsule; 1 Cap daily; Therapy: 93TTN2973 to (Evaluate:11Jun2018)  Requested for: 50QWT0967; Last    Rx:16Jun2017 Ordered   30  Zolpidem Tartrate 10 MG Oral Tablet; TAKE 1 TABLET AT BEDTIME AS NEEDED FOR    SLEEP;     Therapy: 86CTL7895 to (Evaluate:19Nov2014) Requested for: 98HUD8095; Last    QE:04GRS9367; Status: ACTIVE - Renewal Voided Ordered    Future Appointments    Date/Time Provider Specialty Site   10/11/2017 01:40 PM SALVADOR Fowler  Endocrinology [facility] ENDOCRINOLOGY   10/12/2017 12:30 PM Consuelo Mckinney MD Neurology [facility] NEUROLOGY ASSOC  [facility] Howell   10/31/2017 11:40 AM SALVADOR Prescott  Cardiology  CARDIOLOGY  [place]   10/30/2017 12:00 PM Kd Mims MD Neurology [place] [address]   01/22/2018 09:30 AM Kd Mims MD Neurology [facility]   10/03/2017 10:00 AM Olamide Jimenez MD Gastroenterology Adult [facility] GASTROENTEROLOGY  [place]     Allergies    1  No Known Drug Allergies    2  No Known Environmental Allergies   3   No Known Food Allergies    Signatures   Electronically signed by : Radha Brown, ; Sep 13 2017  4:57PM EST                       (Author)

## 2018-01-12 NOTE — PROGRESS NOTES
Plan    1  DSMT/MNT Time Record; Status:Complete;   Done: 40BXG4123 12:00AM    Discussion/Summary    PATIENT EDUCATION RECORD   Healthy Eating:   Provided food diary and instructions on use: Method: Instruction and HandoutResponse: Verbalizes Understanding   Discussed fat intake and choices: Method: Instruction  Response: Verbalizes Understanding   Discussed Food recall  Method: Instruction  Response: Verbalizes Understanding He was given the following educational materials: Calorie and Carbohydrate Tracking Books/Websites/Phone Apps   Chief Complaint  Patient with T2DM seen for MNT follow-up visit  History of Present Illness  Patient weight unchanged since his last visit in July  Yovn Che does not keep food logs  Unable to assess intake  Patient reports making the following changes: eliminated sugar, candy, all snacks and fast food for the past month  He reports only consuming salads for his meals  Patient joined the [facility] and silver sneakers and has been exercising 4-5 days a week  He has recently experienced chest pains and has been limited in the exercise he is allowed to do  Despite the changes he has made he has been unable to lose any weight  Patient may do better in a medically managed weight loss program or meeting with bariatrics to explore other options for weight management given his BMI of 47  Food recall is as follows:  B: 2 eggs, 1 large bagel with 1 Tblsp butter, coffee with sweet-n-low  L: veggie salad from [employer] with 1/4 cup vinaigrette, 2 cups chili from [facility]  D: 10 oz sausage, 1 5 cups rice, string beans, unsweetened iced tea  Food record reveals high fat food choices and the intake of refined white grains  Patient was encouraged to be mindful of his fat intake as it is calorically dense  He would benefit from choosing whole grains with high fiber content  Yvon Che reports high BG in the morning   Suggested he 15 grams of CHO and some protein as a small evening snack to see if that helps with AM BG  Patient, again, agreed to keep food logs  Follow-up TBD  This is his follow-up assessment   Present at session: patient    Medical Nutrition Therapy Intervention: Strategies to reduce fat intake, Strategies to increase fiber intake and food recall   His comprehension was good   His motivation was fair   His compliance was poor   Goals:  1  Keep daily food logs  Vitals  Signs   Recorded: 98ETA5077 01:55PM   Weight: 367 lb 2 oz  BMI Calculated: 47 14  BSA Calculated: 2 81    Results/Data  DSMT/MNT Time Record 26IEH7305 12:00AM [place]      Test Name Result Flag Reference   Date of Service 10/18/2017     Start - Stop Time 2:15-2:45PM     Total MInutes 30 minutes     Group Or Individual Instruction MNT-I       Future Appointments    Date/Time Provider Specialty Site   11/09/2017 11:00 AM SALVADOR Orozco  Endocrinology [facility] ENDOCRINOLOGY   01/15/2018 02:00 PM SALVADOR Orozco  Endocrinology [facility] ENDOCRINOLOGY   10/26/2017 10:00 AM Endocrinology, Nurse Schedule  [facility] ENDOCRINOLOGY   11/09/2017 10:00 AM Endocrinology, Nurse Schedule  [facility] ENDOCRINOLOGY   10/31/2017 11:40 AM SALVADOR Burns   Cardiology  CARDIOLOGY  [place]   10/30/2017 12:00 PM Konrad Vizcaino MD Neurology  [employer]   01/22/2018 09:30 AM Konrad Vizcaino MD Neurology [address]     Signatures   Electronically signed by : Eduar Sherman, [facility]; Oct 19 2017  2:22PM EST                       (Author)    Electronically signed by : SALVADOR Patel ; Oct 20 2017  8:01AM EST

## 2018-01-12 NOTE — MISCELLANEOUS
History of Present Illness    The patient is being contacted for follow-up after hospitalization  This was not a readmission  The date of discharge was 1/20  He has a moderate risk for readmission  Spoke with the patient  He was discharged to home  The patient's weight today is 343  The patient is currently asymptomatic  Topics counseled included diet, need for daily weights and symptoms to report  [facility] Additional Notes:   Patient to see Dr Leyda Albright on Wednesday 1/26  Continues with chronic pain  Encouraged patient to continue with low sodium diet  Current Meds   1  Advair Diskus 250-50 MCG/DOSE Inhalation Aerosol Powder Breath Activated; Therapy: 43KZV6178 to Recorded   2  Amitiza 24 MCG Oral Capsule; TAKE ONE CAPSULE BY MOUTH TWICE A DAY; Therapy: 34BUR9420 to (Evaluate:20Smb1121)  Requested for: 08WAW5872; Last   Rx:26Nov2014 Ordered   3  Aspirin 81 MG Oral Tablet; TAKE 1 TABLET DAILY; Therapy: 84YXY3621 to (Evaluate:44Cxq8581) Recorded   4  Atorvastatin Calcium 40 MG Oral Tablet; TAKE 1 TABLET AT BEDTIME; Therapy: 88QBO7763 to ([phone])  Requested for: 80IEF9986; Last   Rx:32Pkg6111 Ordered   5  Baclofen 10 MG Oral Tablet; TAKE 1 TABLET 3 times daily; Therapy: 97MGB2886 to (Evaluate:10Mar2015)  Requested for: 95Hvd4278; Last   Rx:95Xzm6360 Ordered   6  Bystolic 10 MG Oral Tablet; Take 1 tablet daily; Therapy: 85HNE5792 to (Evaluate:18Nov2016)  Requested for: 89MGF4558; Last   Rx:24Nov2015 Ordered   7  Citalopram Hydrobromide 40 MG Oral Tablet; TAKE 1 TABLET BY MOUTH EVERY DAY; Therapy: 54CIN9589 to [phone])  Requested for: 46AOS9467; Last   Rx:26Nov2014 Ordered   8  Clopidogrel Bisulfate 75 MG Oral Tablet (Plavix); take 1 tablet by mouth every day; Therapy: 69SYO9771 to (Evaluate:37Gsv1787)  Requested for: 64PRT9792; Last   Rx:49Vjs5551 Ordered   9  Lantus 100 UNIT/ML Subcutaneous Solution; Inject 50 units at bedtime;    Therapy: 03RJD2773 to (Evaluate:16Mar2016)  Requested for: 12SMM0979; Last   Rx:32Ult0295 Ordered   10  Nitrostat 0 4 MG Sublingual Tablet Sublingual; DISSOLVE 1 TABLET UNDER THE    TONGUE AS NEEDED FOR CHEST PAIN;    Therapy: 39NHX8990 to (Evaluate:25Jan2013)  Requested for: 32EMY5455; Last    Rx:15Pti4590 Ordered   11  Opana ER 30 MG Oral Tablet ER 12 Hour Abuse-Deterrent; TAKE 1 TABLET TWICE    DAILY; Therapy: 05RST9721 to Recorded   12  OxyCODONE HCl - 30 MG Oral Tablet; Therapy: 96NAG4299 to (Last Rx:02Apr2011)  Requested for: 02Apr2011 Ordered   13  Polyethylene Glycol 3350 Oral Powder; MIX 1 CAPFUL (17GM) IN 8 OZ OF WATER,JUICE    OR TEA AND DRINK DAILY; Therapy: 28EQS2929 to (Evaluate:08Ubt8781)  Requested for: 33KPX0630; Last    Rx:26Nov2014 Ordered   14  Potassium Chloride ER 10 MEQ Oral Capsule Extended Release; TAKE 1 CAPSULE    DAILY; Therapy: 10HUJ3357 to (Evaluate:18Nov2016)  Requested for: 28DBN0725; Last    Rx:24Nov2015 Ordered   15  Ranexa 1000 MG Oral Tablet Extended Release 12 Hour; take 1 tablet every 12 hours; Therapy: 10QPG7182 to (Dheeraj Suarez)  Requested for: 90UXN6215; Last    Rx:94Wqk7178 Ordered   16  Topiramate 100 MG Oral Tablet; take 1 tablet by mouth twice a day; Therapy: 63RHH0755 to (Evaluate:68Ole8497)  Requested for: 03Xjk6439; Last    Rx:30Hzz1661 Ordered   17  Torsemide 100 MG Oral Tablet; 1/2 PO BID; Therapy: 48Fwl7936 to (Evaluate:18Nov2016); Last Rx:24Nov2015 Ordered   18  TraZODone HCl - 100 MG Oral Tablet; TAKE 1-3 TABLETS AT BEDTIME AS NEEDED FOR    DIFFICULTY SLEEPING; Therapy: 91RPT4220 to (Evaluate:69Mpj3301)  Requested for: 05PGW1002; Last    Rx:26Nov2014 Ordered   19  Zolpidem Tartrate 10 MG Oral Tablet; TAKE 1 TABLET AT BEDTIME AS NEEDED FOR    SLEEP; Therapy: 06PRR7347 to (Evaluate:19Nov2014)  Requested for: 43TJD1328;  Last    JJ:75EJY3126; Status: ACTIVE - Renewal Voided Ordered    Future Appointments    Date/Time Provider Specialty Site   04/13/2016 11:40 AM SALVADOR Fowler  Endocrinology [facility] ENDOCRINOLOGY   02/16/2016 10:40 AM SALVADOR Prescott  Cardiology  [facility]   03/29/2016 01:00 PM SALVADOR [place]  [facility]   03/23/2016 01:00 PM Kd Mims MD Neurology [facility] NEUROLOGY ASSOC   02/02/2016 10:30 AM Germán Fishman [facility] Neurology [facility] NEUROLOGY ASSOC     Allergies    1   No Known Drug Allergies    Signatures   Electronically signed by : Kate Nunez, ; Jan 22 2016  3:08PM EST                       (Author)

## 2018-01-12 NOTE — RESULT NOTES
Message   A1C high 7 5 , goal less than 7 , send over log in 2 weeks      Verified Results  (1) LIPID PANEL, FASTING 56Nfl0668 07:59AM Michael Donnelly   TW Order Number: [id number]     Test Name Result Flag Reference   CHOLESTEROL 129 mg/dL     HDL,DIRECT 31 mg/dL L 40-60   Specimen collection should occur prior to Metamizole administration due to the potential for falsely depressed results  LDL CHOLESTEROL CALCULATED 70 mg/dL  0-100   - Patient Instructions: This is a fasting blood test  Water,black tea or black  coffee only after 9:00pm the night before test   Drink 2 glasses of water the morning of test     - Patient Instructions: This is a fasting blood test  Water,black tea or black  coffee only after 9:00pm the night before test Drink 2 glasses of water the morning of test - Patient Instructions: This bloodwork is non-fasting  Please drink two glasses of   water morning of bloodwork  Triglyceride:         Normal              <150 mg/dl       Borderline High    150-199 mg/dl       High               200-499 mg/dl       Very High          >499 mg/dl  Cholesterol:         Desirable        <200 mg/dl      Borderline High  200-239 mg/dl      High             >239 mg/dl  HDL Cholesterol:        High    >59 mg/dL      Low     <41 mg/dL  LDL CALCULATED:    This screening LDL is a calculated result  It does not have the accuracy of the Direct Measured LDL in the monitoring of patients with hyperlipidemia and/or statin therapy  Direct Measure LDL (PSM498) must be ordered separately in these patients  TRIGLYCERIDES 140 mg/dL  <=150   Specimen collection should occur prior to N-Acetylcysteine or Metamizole administration due to the potential for falsely depressed results       (1) COMPREHENSIVE METABOLIC PANEL 99TMD8867 36:77SJ Claudeen Mori Order Number: [id number]     Test Name Result Flag Reference   GLUCOSE,RANDM 187 mg/dL H    If the patient is fasting, the ADA then defines impaired fasting glucose as > 100 mg/dL and diabetes as > or equal to 123 mg/dL  SODIUM 141 mmol/L  136-145   POTASSIUM 3 3 mmol/L L 3 5-5 3   CHLORIDE 104 mmol/L  100-108   CARBON DIOXIDE 27 mmol/L  21-32   ANION GAP (CALC) 10 mmol/L  4-13   BLOOD UREA NITROGEN 14 mg/dL  5-25   CREATININE 1 32 mg/dL H 0 60-1 30   Standardized to IDMS reference method   CALCIUM 8 8 mg/dL  8 3-10 1   BILI, TOTAL 0 36 mg/dL  0 20-1 00   ALK PHOSPHATAS 134 U/L H    ALT (SGPT) 24 U/L  12-78   AST(SGOT) 29 U/L  5-45   ALBUMIN 2 9 g/dL L 3 5-5 0   TOTAL PROTEIN 7 1 g/dL  6 4-8 2   eGFR Non-African American 56 7 ml/min/1 73sq m     - Patient Instructions: This is a fasting blood test  Water,black tea or black  coffee only after 9:00pm the night before test Drink 2 glasses of water the morning of test - Patient Instructions: This bloodwork is non-fasting  Please drink two glasses of   water morning of bloodwork  National Kidney Disease Education Program recommendations are as follows:  GFR calculation is accurate only with a steady state creatinine  Chronic Kidney disease less than 60 ml/min/1 73 sq  meters  Kidney failure less than 15 ml/min/1 73 sq  meters  (1) HEMOGLOBIN A1C 28Axo3261 07:59AM [facility] Order Number: [id number]     Test Name Result Flag Reference   HEMOGLOBIN A1C 7 5 % H 4 2-6 3   EST  AVG  GLUCOSE 169 mg/dl       (1) TSH 06Okk2399 07:59AM [facility] Order Number: [id number]     Test Name Result Flag Reference   TSH 1 903 uIU/mL  0 358-3 740   - Patient Instructions: This bloodwork is non-fasting  Please drink two glasses of water morning of bloodwork  - Patient Instructions: This is a fasting blood test  Water,black tea or black  coffee only after 9:00pm the night before test Drink 2 glasses of water the morning of test - Patient Instructions: This bloodwork is non-fasting  Please drink two glasses of   water morning of bloodwork    Patients undergoing fluorescein dye angiography may retain small amounts of fluorescein in the body for 48-72 hours post procedure  Samples containing fluorescein can produce falsely depressed TSH values  If the patient had this procedure,a specimen should be resubmitted post fluorescein clearance       (1) T4, FREE 36Tcr1133 07:59AM Pricilla Sandifer    Order Number: [id number]     Test Name Result Flag Reference   T4,FREE 0 89 ng/dL  0 76-1 46

## 2018-01-12 NOTE — PROCEDURES
Procedures by ROEL Grant at 6/2/2016   2:05 PM      Author:  ROEL Grant Service:  (none) Author Type:  Speech and Language Pathologist     Filed:  6/2/2016  3:27 PM Date of Service:  6/2/2016  2:05 PM Status:  Signed     :  ROEL Grant (Speech and Language Pathologist)         Pre-procedure Diagnoses:       1  Dysphagia following unspecified cerebrovascular disease [I69 991]       2  Other cerebrovascular disease [I67 89]       3  Brain aneurysm [I67 1]                Procedures:       1  FL BARIUM Nohemi Ramos SPEECH [[id number] (Custom)]                                                      Video Swallow Study      Patient Name: Laura Guillaume  Today's Date: 6/2/2016  par  par  Past Medical History  Past Medical History      Diagnosis   Date    Acute on chronic diastolic congestive heart failure      Altered gait      Angina pectoris      Brain aneurysm      Cardiac disease      Chest pain  1/13/2016    CPAP (continuous positive airway pressure) dependence      Depression      Diabetes mellitus       insulin dependent     Hypercholesterolemia      Hypertension      MI (myocardial infarction)      Sleep apnea      Stented coronary artery      Stroke      Wears dentures      Wears glasses       par  Past Surgical History  Past Surgical History       Procedure   Laterality Date    Hernia repair       Knee arthroscopy  Right     Brain surgery       Back surgery       Knee arthroscopy  Right     Pr insert spine infusn device,subcut  N/A 5/16/2016     Procedure: REPLACEMENT AND PROGRAM PUMP ;  Surgeon: Vidhi Roy MD;  Location: [facility];  Service: Orthopedics       PMH taken from recent office visit w/ PCP  Pt has continued decline w/ memory  Dementia w/ cog decline per office visit notes  Reported diffiuclty w/ swallowing  Pt has morphine pump, which had been malfunctioning  Wife provides much assistance   Pt arrived to study w/ use of rollator  Active Problems  1  Acute bronchitis (466 0) (J20 9)  2  Angina pectoris (413 9) (I20 9)  3  Arteriosclerosis of coronary artery (414 00) (I25 10)  4  Ascites (789 59) (R18 8)  5  Atherosclerosis (440 9) (I70 90)  6  Atherosclerosis of arteries of extremities (440 20) (I70 209)  7  Benign essential hypertension (401 1) (I10)  8  Blurry vision (368 8) (H53 8)  9  Brain aneurysm (437 3) (I67 1)  10  Breast pain (611 71) (N64 4)  11  Bronchitis (490) (J40)  12  Chest pain (786 50) (R07 9)  13  Chronic constipation (564 00) (K59 09)  14  Chronic diastolic CHF (congestive heart failure) (428 32,428 0) (I50 32)  15  Chronic migraine without aura (346 70) (G43 709)  16  Chronic tension type headache (339 12) (G44 229)  17  Depression (311) (F32 9)  18  Diabetes mellitus type 2, uncontrolled (250 02) (E11 65)  19  Diabetes mellitus, type 2 (250 00) (E11 9)  20  Edema (782 3) (R60 9)  21  Facial Nerve Injury (351 9)  22  History of CVA (cerebrovascular accident) (V12 54) (Z86 73)  21  Hypercholesterolemia (272 0) (E78 0)  24  Hypokalemia (276 8) (E87 6)  25  Hypothyroidism (244 9) (E03 9)  26  Insomnia (780 52) (G47 00)  27  Joint pain, knee (719 46) (M25 569)  28  Lumbago (724 2) (M54 5)  29  Memory disturbance (780 93) (R41 3)  30  Morbid or severe obesity due to excess calories (278 01) (E66 01)  31  Need for immunization against influenza (V04 81) (Z23)  32  Obstructive sleep apnea (327 23) (G47 33)  33  Other chronic pain (338 29) (G89 29)  34  Peripheral neuropathy (356 9) (G62 9)  35  Preop examination (V72 84) (Z01 818)  36  Preoperative cardiovascular examination (V72 81) (Z01 810)  37  Shortness of breath (786 05) (R06 02)  38  Stabbing headache (339 85) (G44 85)  39  Status post cardiac catheterization (V45 89) (Z98 89)  40  Tear of medial meniscus of right knee (836 0) (S83 241A)  41  Venous Intermittent Claudication (453 89)  42  Wheezing on auscultation (786 07) (R06 2)  Past Medical History  1  History of Coronary Artery Disease (V12 59)  2  History of Femoral Nerve Palsy On The Right (355 2)  3  History of acute sinusitis (V12 69) (Z87 09)  4  History of Need for prophylactic vaccination and inoculation against influenza (V04 81)  (Z23)  5  Old myocardial infarction (412) (I25 2)  6  History of Stroke Syndrome (436)  Surgical History  1  History of Brain Surgery  2  History of Neurol Drug Infusion Implantation Of Programmable Pump  3  History of Stent Indications: Restenosis At Previous PTCA Location  Cerebral aneurysm s/p coiling 2005  Video Swallow Evaluation:    Pt is 55yom from referred by Dr Ariana Cantu for VBS  Pt w/ c/o difficulty swallowing solids  Stated they get stuck in his chest and if he tries to drink a lot on top of it, the drink comes back up  Pt has morbid obesity  View was limited in equipment due to pt's size  Current Diet:  regular  Dentition:  Edentulous (has a few nubs)  states he has dentures but doesn't wear them to eat  O2 requirement:  no  Vocal Quality/Speech:  wnl  Cognitive status:  Alert, oriented  Talked about the presidential candidates  Consistencies administered: Barium laden applesauce, banana, cheerios/nectar, soft solid, hard solid, thin liquids, 13mm barium pill  Liquids were administered by cup and straw  Pt was seated laterally at 90 degrees  Oral stage:  MILD due to reduced mastication w/ harder solids  Lip closure:wnl  Mastication: mildy reduced w/ harder solids  Bolus formation: wnl  Bolus control:wnl  Transfer:wnl  Residue:none    Pharyngeal stage:  WFL/mild  Swallow promptness: wnl  Epiglottic inversion: inconsistent  Laryngeal rise:midly reduced  Pharyngeal constriction: inconsistent/wfl/mildly reduced  Vallecular retention: mild to none  Penetration: none noted in suboptimal view  (body size, shadows)  Aspiration:none noted in limited view      Screening of Esophageal stage:  Impaired  Dysmotility: yes  Retropulsion:yes  [facility] contractions: yes  Reflux:yes  Retention: mild  Stricture: does not appear to have one, per gross screen    Summary:  Mild oral stage 2* reduced mastication  Mild but functional pharyngeal stage  Variable pharyngeal constriction, epiglottic inversion, and laryngeal rise  Mild retention w/ purees/solids  No penetration or aspiration observed in this limited  view (due to pt's size and inability to fit laterally in the equipment)  Per gross esophageal screen, the pt did appear to have dysmotility w/ reflux and retropulsion  His primary complaints are esophageal in nature  Recommendations: If further assessment of the esophagus is desired, consider esophagram/barium swallow or EGD, ? PPI     Diet:soft to chew/dysphagia 3  Liquids: thin  Meds:as tolerated  Strategies: avoid dense dry foods, alternate w/ sips of liquids  F/u ST tx: no  Consider consult with: GI  Results reviewed with:patient   Reflux precautions                         Received for:Provider  EPIC   Jun 2 2016  3:27PM [facility] Standard Time

## 2018-01-13 VITALS
DIASTOLIC BLOOD PRESSURE: 70 MMHG | HEART RATE: 59 BPM | HEIGHT: 74 IN | WEIGHT: 315 LBS | SYSTOLIC BLOOD PRESSURE: 124 MMHG | OXYGEN SATURATION: 92 % | RESPIRATION RATE: 18 BRPM | BODY MASS INDEX: 40.43 KG/M2

## 2018-01-13 VITALS — DIASTOLIC BLOOD PRESSURE: 62 MMHG | SYSTOLIC BLOOD PRESSURE: 131 MMHG | TEMPERATURE: 97 F

## 2018-01-13 VITALS
HEART RATE: 64 BPM | TEMPERATURE: 95.7 F | WEIGHT: 315 LBS | OXYGEN SATURATION: 95 % | BODY MASS INDEX: 40.43 KG/M2 | SYSTOLIC BLOOD PRESSURE: 132 MMHG | HEIGHT: 74 IN | DIASTOLIC BLOOD PRESSURE: 68 MMHG

## 2018-01-13 VITALS — BODY MASS INDEX: 47.06 KG/M2 | WEIGHT: 315 LBS

## 2018-01-13 VITALS — SYSTOLIC BLOOD PRESSURE: 120 MMHG | TEMPERATURE: 98.6 F | DIASTOLIC BLOOD PRESSURE: 70 MMHG

## 2018-01-13 VITALS — TEMPERATURE: 98.8 F

## 2018-01-13 NOTE — PROGRESS NOTES
Plan    1  DSMT/MNT Time Record; Status:Complete;   Done: 62MMC8216 03:23PM    Discussion/Summary    PATIENT EDUCATION RECORD   Healthy Eating:   Discussed general nutrition topics: Method: Instruction  Response: Verbalizes Understanding   Discussed nutrient types ( Cho/Fat/Protein): Method: Instruction and Handout  Response: Verbalizes Understanding   Discussed portion sizes: Method: Instruction and Handout  Response: Verbalizes Understanding   Discussed sodium intake: Method: Instruction  Response: Verbalizes Understanding   Discussed Eating Out: Method: Instruction  Response: Verbalizes Understanding  Provided food diary and instructions on use: Method: Instruction and HandoutResponse: Verbalizes UnderstandingResponse: His current weight is 366 8 pounds  His CHO's per meal are 60 grams  He/She was provided a meal plan for: fixed carbohydrates and weight loss  Discussed weight management/weight loss: Method: Instruction  Response: Verbalizes Understanding His weight goal is Lose 10% of present body weight  Discussed fat intake and choices: Method: Instruction and Handout  Response: Verbalizes Understanding   Discussed basic carbohydrate counting: Method: Instruction and Handout  Response: Verbalizes Understanding   Being Active:   Stated the benefits of exercise: Method: Instruction  Response: Verbalizes Understanding   Discussed "exercise guidelines": Method: Instruction  Response: Verbalizes Understanding  Recommend he discuss a structured exercise program with his PCP/Cardiologist  He was given the following educational materials: portion book and Calorie and Carbohydrate Tracking Books/Websites/Phone Apps   Chief Complaint  Patient with T2DM seen for MNT follow-up visit  History of Present Illness  Patient states, "I am not eating right  I need to get back on track"  Patient verbalized how he needs to take better care of himself   He reports his wife is no longer doing things for him that she once did (to help him)  Problems identified in food recall include inconsistent carbohydrate intake at meals, excess calories coming from refined carbohydrates and high fat food choices, low intake of plant based foods, whole grains and low fat dairy and general lack of balance  Instructed the patient on the Stop Light Strategy for making healthy food choices  Advised him to avoid red category foods (which includes all sweetened beverages and fried foods)  Reviewed how to use the portion booklet to assist with carbohydrate counting  Suggested he limit carbohydrate intake to 60 grams per meal to assist with glycemic control  Patient is agreeable to using Sweet-n-Low in his tea and coffee and to making low fat food choices  He also agreed to keep his carbohydrate intake to 60 grams per meal  Patient will to keep daily food logs and bring them to his next visit  Compliance is questionable  RD will remain available for further dietary questions/concerns  This is his follow-up assessment   Present at session: patient    Exercise routine:  Patient states, "I can't exercise  I would like to swim, but I never joined the [facility]"     He eats breakfast at  7:30-8:00AM AM Ham, cheese and salami on 1/2 of a large bagel with 1/2 tblsp mayonnaise, coffee with 2 sugars OR 1 egg with ham on 1 slice of toast, coffee with 2 sugars   He snacks at 10:00AM AM 1-2 plums   He eats lunch at  12:30PM PM 6-8 times a month will have fast food: grilled chicken sandwich, no [employer] fries, and 20oz sweet iced tea at [facility] or [facility] OR, at home: ham and cheese on white bread with mayonnaise, 10 potato chips, zero calorie vitamin water    He eats dinner at  6:00PM PM 1/2 pound roast beef on 2 slices of white bread with gravy and small order of [employer] fries OR 2 medium boiled potatoes with 1-2 tblsp butter, 1 large corn on the cob, 6-8oz meatloaf with ketchup, zero calorie water   He snacks at None     NUTRITION DIAGNOSES   Overweight Obesity   Overweight obesity related to: Not ready for diet/lifestyle change  As evidenced by: BMI more than normative standard for age and sex (obesity-grade III 40+)  Medical Nutrition Therapy Intervention: Carbohydrate counting, Exercise Guidelines, Behavior modification strategies and Stop Light Strategy   His comprehension was good   His motivation was fair   His compliance was fair   Goals:  1  60 grams of CHO per meal   2  Make low fat food choices  3  Initiate regular aerobic exercise  Current Meds   1  Advair Diskus 250-50 MCG/DOSE Inhalation Aerosol Powder Breath Activated; Therapy: 42QBG0589 to Recorded   2  Amitiza 24 MCG Oral Capsule; TAKE ONE CAPSULE BY MOUTH TWICE A DAY; Therapy: 34NKR1177 to (Evaluate:06Kmh1411)  Requested for: 78BBD6410; Last   Rx:26Nov2014 Ordered   3  AmLODIPine Besylate 2 5 MG Oral Tablet; TAKE 1 TABLET DAILY; Therapy: 60ZBB0257 to (22 318738)  Requested for: 79Rkr2624; Last   Rx:17Jmx4062 Ordered   4  Aspirin 81 MG TABS; TAKE 1 TABLET DAILY; Therapy: 15EDP0463 to (Evaluate:34Nqf0954) Recorded   5  Atorvastatin Calcium 40 MG Oral Tablet; TAKE 1 TABLET AT BEDTIME; Therapy: 09AWP3382 to (61 23 68)  Requested for: 67HAA7970; Last   Rx:00Vtv6431 Ordered   6  Baclofen 10 MG Oral Tablet; TAKE 1 TABLET 3 times daily; Therapy: 77AQC8938 to (Evaluate:10Mar2015)  Requested for: 19Jfj2020; Last   Rx:00Sil8113 Ordered   7  Bystolic 10 MG Oral Tablet; take 1 tablet by mouth daily; Therapy: 18WOR8682 to (Evaluate:38Dre2577)  Requested for: 31HPF9263; Last   Rx:15Mar2017 Ordered   8  Citalopram Hydrobromide 40 MG Oral Tablet; TAKE 1 TABLET BY MOUTH EVERY DAY; Therapy: 75YBX8659 to (Rogelio Blank)  Requested for: 08UVR2673; Last   Rx:26Nov2014 Ordered   9  Clopidogrel Bisulfate 75 MG Oral Tablet; take 1 tablet by mouth every day; Therapy: 43FDV8594 to (Evaluate:35Fhj2484)  Requested for: 65BCX1833; Last   Rx:91Cgk9110 Ordered   10  Dicyclomine HCl - 10 MG Oral Capsule; TAKE 1 CAPSULE EVERY 6 HOURS AS    NEEDED; Therapy: 03QLH6112 to (Evaluate:57Ozt4220)  Requested for: 11DVM7433; Last    YS:97DVI3906 Ordered   11  Flomax 0 4 MG Oral Capsule; TAKE 1 CAPSULE Daily; Therapy: 87JTM4763 to Recorded   12  Jardiance 10 MG Oral Tablet; 1 Tab daily; Therapy: 72EKQ1789 to (Evaluate:85Mxb8078)  Requested for: 12HHO7191; Last    GY:10TKJ6755 Ordered   13  Memantine HCl - 10 MG Oral Tablet; one tab in am for 10 days then twice a day afterthat; Therapy: 05FXK0918 to (Last Rx:06Jun2017)  Requested for: 35Gbm1724 Ordered   14  MS Contin 100 MG Oral Tablet Extended Release; i po tid; Therapy: 99KNU2745 to Recorded   15  Neurontin 400 MG Oral Capsule; 2 tabs twice per day; Therapy: 27Pvc3646 to  Requested for: 27Jun2017 Recorded   16  Nitrostat 0 4 MG Sublingual Tablet Sublingual; TAKE 1 TABLET SUBLINGUALLY AS    NEEDED; Therapy: 34JAF1664 to (Evaluate:24Kxx1762)  Requested for: 13NXR4003; Last    Rx:03Ttf9782 Ordered   17  NovoLOG FlexPen 100 UNIT/ML Subcutaneous Solution Pen-injector; 25 units with    breakfast and lunch and 30 units before dinner  Requested for: 83Xjj6048;    Last GN:86GRA1722 Ordered   18  Omeprazole 20 MG Oral Tablet Delayed Release; 1 PO BID; Therapy: 32WJN9690 to (Last Rx:34Xhn4219)  Requested for: 32WIV6613 Ordered   19  OxyCODONE HCl - 30 MG Oral Tablet; Therapy: 21THJ1914 to (Last Rx:02Apr2011)  Requested for: 87Vln4456 Ordered   20  Potassium Chloride ER 10 MEQ Oral Capsule Extended Release; TAKE 1 CAPSULE    TWICE DAILY WITH MEALS; Therapy: 78AZR3573 to ([phone])  Requested for: 26Apr2017; Last    Rx:26Apr2017 Ordered   21  Qvar 80 MCG/ACT Inhalation Aerosol Solution; INHALE 1 PUFF TWICE DAILY; Therapy: 99SDI8852 to (Last Rx:26Mar2016)  Requested for: 16RPU0402 Ordered   22  Ranexa 1000 MG Oral Tablet Extended Release 12 Hour; take 1 tablet every 12 hours;     Therapy: 32FRR7457 to (Evaluate:89Hea7296)  Requested for: 21FCL0904; Last    Rx:15Mar2017 Ordered   23  Topiramate 100 MG Oral Tablet; TAKE 1 TABLET IN AM and Two at bedtime; Therapy: 50EYX6176 to (Evaluate:01Jun2018)  Requested for: 68JSQ8773; Last    NK:55RZA6425 Ordered   24  Torsemide 100 MG Oral Tablet; One po BID; Therapy: 75Kwx8363 to (Leesa Parmar) Recorded   25  TraZODone HCl - 100 MG Oral Tablet; TAKE 1-3 TABLETS AT BEDTIME AS NEEDED FOR    DIFFICULTY SLEEPING; Therapy: 21QIO1920 to (Evaluate:73Bam4979)  Requested for: 46QNH2255; Last    Rx:26Nov2014 Ordered   26  Amador Dotter FlexTouch 200 UNIT/ML Subcutaneous Solution Pen-injector; 110 units daily at    bedtime; Therapy: 29IJL6480 to (Evaluate:98Caz7237)  Requested for: 77LCH8259; Last    FU:58RFX4254 Ordered   27  Vitamin D (Ergocalciferol) 46207 UNIT Oral Capsule; TAKE 1 CAPSULE WEEKLY FOR    12 WEEKS; Therapy: 75LTH2648 to (Last Rx:16Jun2017)  Requested for: 53QGQ6826 Ordered   28  Vitamin D3 5000 UNIT Oral Capsule; 1 Cap daily; Therapy: 16AEF9385 to (Evaluate:11Jun2018)  Requested for: 43XGU8530; Last    Rx:16Jun2017 Ordered   29  Zolpidem Tartrate 10 MG Oral Tablet; TAKE 1 TABLET AT BEDTIME AS NEEDED FOR    SLEEP; Therapy: 60GJQ9485 to (Evaluate:19Nov2014)  Requested for: 96WEQ2937; Last    Rx:01Bqw8626; Status: ACTIVE - Renewal Voided Ordered    Vitals  Signs   Recorded: 10XWT1143 03:25PM   Weight: 366 lb 8 oz  BMI Calculated: 47 06  BSA Calculated: 2 81    Results/Data  DSMT/MNT Time Record 41WKT6589 03:23PM Yared Summers     Test Name Result Flag Reference   Date of Service 7/11/2017     Start - Stop Time 11:15-11:45AM     Total MInutes 30 minutes     Group Or Individual Instruction MNT-I       Future Appointments    Date/Time Provider Specialty Site   10/11/2017 01:40 PM SALVADOR Wood  Endocrinology [facility] ENDOCRINOLOGY   08/01/2017 04:00 PM SALVADOR Mixon   Cardiology  CARDIOLOGY  [place]   10/31/2017 11:40 AM SALVADOR Mixon  [facility]   07/19/2017 03:30 PM Sharilyn Cowden, MD Neurology [facility] 21   01/22/2018 09:30 AM Sharilyn Cowden, MD Neurology [facility] NEUROLOGY [facility]   10/12/2017 11:00 AM Linden Senior, [address] Neurology [facility] 21     Signatures   Electronically signed by : Rafaela Zaragoza [facility]; Jul 11 2017  4:09PM EST                       (Author)    Electronically signed by : SALVADOR Nielsen ; Jul 17 2017 10:41AM EST

## 2018-01-13 NOTE — RESULT NOTES
Discussion/Summary   A1C 6 9 -improved , however his kidney numbers are higher which could be sec to dehydration from jardinace - for now keep well hydrated and repeat BMP in 2 weeks if kidney function does not improve , may have to stop jardiance      Verified Results  (1) COMPREHENSIVE METABOLIC PANEL 27FXH5924 54:53MT Liseth Trent    Order Number: [id number]     Test Name Result Flag Reference   SODIUM 139 mmol/L  136-145   POTASSIUM 3 4 mmol/L L 3 5-5 3   CHLORIDE 99 mmol/L L 100-108   CARBON DIOXIDE 32 mmol/L  21-32   ANION GAP (CALC) 8 mmol/L  4-13   BLOOD UREA NITROGEN 28 mg/dL H 5-25   CREATININE 1 62 mg/dL H 0 60-1 30   Standardized to IDMS reference method   CALCIUM 8 8 mg/dL  8 3-10 1   BILI, TOTAL 0 46 mg/dL  0 20-1 00   ALK PHOSPHATAS 126 U/L H    ALT (SGPT) 24 U/L  12-78   Specimen collection should occur prior to Sulfasalazine and/or Sulfapyridine administration due to the potential for falsely depressed results  AST(SGOT) 13 U/L  5-45   Specimen collection should occur prior to Sulfasalazine administration due to the potential for falsely depressed results  ALBUMIN 3 0 g/dL L 3 5-5 0   TOTAL PROTEIN 6 7 g/dL  6 4-8 2   eGFR 47 ml/min/1 73sq m     [facility] Disease Education Program recommendations are as follows:  GFR calculation is accurate only with a steady state creatinine  Chronic Kidney disease less than 60 ml/min/1 73 sq  meters  Kidney failure less than 15 ml/min/1 73 sq  meters  GLUCOSE FASTING 92 mg/dL  65-99   Specimen collection should occur prior to Sulfasalazine administration due to the potential for falsely depressed results  Specimen collection should occur prior to Sulfapyridine administration due to the potential for falsely elevated results       (1) TSH 11Oct2017 04:36PM [facility] Order Number: [id number]     Test Name Result Flag Reference   TSH 2 340 uIU/mL  0 358-3 740   Patients undergoing fluorescein dye angiography may retain small amounts of fluorescein in the body for 48-72 hours post procedure  Samples containing fluorescein can produce falsely depressed TSH values  If the patient had this procedure,a specimen should be resubmitted post fluorescein clearance  (1) T4, FREE 27VXA1137 04:36PM Lisa [facility] Order Number: [id number]     Test Name Result Flag Reference   T4,FREE 0 95 ng/dL  0 76-1 46   Specimen collection should occur prior to Sulfasalazine administration due to the potential for falsely elevated results  (1) HEMOGLOBIN A1C 11Oct2017 04:36PM [facility] Order Number: [id number]     Test Name Result Flag Reference   HEMOGLOBIN A1C 6 9 % H 4 2-6 3   EST  AVG   GLUCOSE 151 mg/dl       (1) MICROALBUMIN CREATININE RATIO, RANDOM URINE 06TRA6967 04:36PM [facility] Number: [id number]     Test Name Result Flag Reference   MICROALBUMIN/ CREAT R <6 mg/g creatinine  0-30   MICROALBUMIN,URINE <5 0 mg/L  0 0-20 0   CREATININE URINE 88 0 mg/dL

## 2018-01-13 NOTE — PROGRESS NOTES
Plan    1  DSMT/MNT Time Record; Status:Complete;   Done: [id number] 01:05PM    Discussion/Summary    PATIENT EDUCATION RECORD   Indication for Services: type 2 Diabetes Mellitus, hyperlipidemia and obesity  He is ready to learn  He has visual barriers to learning  Healthy Eating:   Discussed general nutrition topics: Method: Instruction  Response: Verbalizes Understanding   Discussed importance of meal timing/consistency: Method: Instruction  Response: Verbalizes Understanding   Discussed nutrient types ( Cho/Fat/Protein): Method: Instruction and Handout  Response: Verbalizes Understanding   Discussed portion sizes: Method: Instruction and Handout  Response: Verbalizes Understanding   Discussed Eating Out: Method: Instruction  Response: Verbalizes Understanding   Discussed food label reading: Method: Instruction  Response: Verbalizes Understanding  Provided food diary and instructions on use: Method: Instruction and HandoutResponse: Verbalizes Understanding   Provided meal planning: Method: Instruction  Response: Verbalizes Understanding His current weight is 364 5  His keal needs are ~2500 calories  His CHO's per meal are ~60-75 grams  Discussed weight management/weight loss: Method: Instruction  Response: Verbalizes Understanding   Discussed fat intake and choices: Method: Instruction and Handout  Response: Verbalizes Understanding   Discussed basic carbohydrate counting: Method: Instruction and Handout  Response: Verbalizes Understanding   Being Active:   Recommend he discuss a structured exercise program with his PCP/Cardiologist  He was given the following educational materials: portion book  Goal sheet  Chief Complaint  Patient with T2DM, morbid obesity, and hypercholesterolemia seen for diet consult per MD request       History of Present Illness  Patient reports losing weight ~3 years ago and was down to 280 pounds  He states, "I don't know how I lost the weight   I wasn't trying to lose weight at the time  I think medication caused me to start gaining it back"  Problems identified in food recall include excess calories coming from refined sugar (patient consuming sweetened iced tea and Power Oliva all day long ~64+ ounces), excess calories coming from high fat food choices (fast food daily, sometimes twice a day), low intake of plant based foods, whole grains and low fat dairy and general lack of balance  Advised patient to avoid all sweetened beverages and adding sugar to his coffee to assist with glycemic control  Leanna Bryant is agreeable to drinking Crystal Light instead  Suggested he limit fast food intake to once a month  Patient agreed to make sandwiches at home (tuna or ham and cheese) and have one piece of fruit with lunch to assist with lipid management and calorie control  Encouraged patient to start with these changes to see how he does  Provided patient with a portions booklet  Advised him to keep his carbohydrate intake to 4 choices per meal  He was shown how to use the portion booklet to measure his food out  Lclorena Hurtado admits to having feelings of "what's the use" and uses that as a pass to continue poor food habits  He may benefit from behavior change counseling  Patient goals were written out and given to him to hang on his refrigerator per his request  Leanna Hurtado states, "I have early Alzheimers and I will forget this conversation when I get home"  Encouraged patient to speak to his MD regarding a safe level of exercise  Patient agreed to keep daily food logs  He is scheduled to return for follow-up on 9/20/2016  RD will remain available for further dietary questions/concerns  This is his initial assessment    Present at session: patient    Medical Diagnosis/Reason for Referral:T2DM, Morbid Obesity, Hypercholesterolemia  He has no special learning needs  His caloric needs are 2500 calories  Recent weight change: ~85 pound weight gain in the past several years  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  Patient  cooks the food  Exercise routine:  Patient does not exercise beyond his daily activities  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  He eats breakfast at  7:30-8:00AM AM 1/2 cup chipped beef in gravy, 2 slices white toast or 4 biscuits OR 2 eggs with 1/2 cup home fries and 2 sausage links or 2 slices of farah; coffee with 2 Tblsp sugar and 1 tsp creamer   He snacks at AM 1 regular Power Oliva drink and sweet iced tea   He eats lunch at  12-12:30 PM FAST FOOD DAILY: BK Whopper and a 32 oz sweetened iced tea OR Tsering 10" meatball sub with cheese and 32 oz sweetened iced tea OR 12" [place] hoagie and 00SC iced tea OR 2 slices [place] pizza and iced tea   He snacks at PM 1 orange or 12 cherries or 12 grapes and sweet iced tea   He eats dinner at  7:00PM PM 1 cup Donita's chili over 1 cup white rice or [facility] chicken pot pie or chicken wings and Western [employer] fries or [employer]; patient wife cooks 3-4 times a week and orders take-out 3-4 times a week   He snacks at at bedtime None     NUTRITION DIAGNOSES   Excessive Energy Intake   Excessive energy intake related to: Food and nutrition related knowledge deficit concerning energy intake  As evidenced by: Intake of high caloric density or large portions of foods/beverages  Medical Nutrition Therapy Intervention: Plate Method, Carbohydrate counting, Meal planning, Strategies to reduce fat intake, Strategies to increase the intake of plant based foods, Strategies to monitor portion control, Label reading, Meal timing, Exercise Guidelines, Behavior modification strategies and Weight/BMI Goals  His comprehension was good   His motivation was good   His compliance was good   Goals:  1  Avoid sugar sweetened beverages (switch to Crystal Light) and using sugar in coffee  2  Limit fast food intake to once a month  3  Speak to MD regarding a safe level of physical activity        Vitals  Signs   Recorded: 10Aug2016 01:06PM Weight: 364 lb 8 oz  BMI Calculated: 46 8  BSA Calculated: 2 8    Results/Data  DSMT/MNT Time Record 10Aug2016 01:05PM Gaudencio Lainez     Test Name Result Flag Reference   Date of Service 8/10/2016     Start - Stop Time 11:15-12:00PM     Total MInutes 45 minutes     Group Or Individual Instruction MNT-I       Future Appointments    Date/Time Provider Specialty Site   09/20/2016 11:00 AM Gaudencio Lainez RD, LDN Diabetes Educator [facility] ENDOCRINOLOGY   11/15/2016 09:40 AM SALVADOR Michael  Cardiology  CARDIOLOGY  [place]   10/11/2016 03:00 PM Devan Glynn, [address] Neurology [facility] NEUROLOGY [address]   11/01/2016 11:00 AM Fab Romero, [address] Endocrinology [facility] ENDOCRINOLOGY   10/13/2016 01:30 PM Boris Cunha, [address] Gastroenterology Adult [facility] 9     Signatures   Electronically signed by : Annika Colvin [facility];  Aug 10 2016  3:24PM EST                       (Author)    Electronically signed by : SALVADOR Lentz ; Aug 11 2016 10:34AM EST

## 2018-01-14 VITALS
TEMPERATURE: 96.8 F | SYSTOLIC BLOOD PRESSURE: 130 MMHG | HEART RATE: 71 BPM | HEIGHT: 74 IN | OXYGEN SATURATION: 96 % | BODY MASS INDEX: 40.43 KG/M2 | WEIGHT: 315 LBS | DIASTOLIC BLOOD PRESSURE: 70 MMHG

## 2018-01-14 VITALS
TEMPERATURE: 97.8 F | DIASTOLIC BLOOD PRESSURE: 76 MMHG | OXYGEN SATURATION: 95 % | WEIGHT: 315 LBS | BODY MASS INDEX: 46.48 KG/M2 | SYSTOLIC BLOOD PRESSURE: 124 MMHG | HEART RATE: 63 BPM

## 2018-01-14 VITALS — DIASTOLIC BLOOD PRESSURE: 56 MMHG | SYSTOLIC BLOOD PRESSURE: 117 MMHG | TEMPERATURE: 97.8 F | HEART RATE: 68 BPM

## 2018-01-14 VITALS
WEIGHT: 315 LBS | HEART RATE: 72 BPM | DIASTOLIC BLOOD PRESSURE: 80 MMHG | HEIGHT: 74 IN | SYSTOLIC BLOOD PRESSURE: 122 MMHG | BODY MASS INDEX: 40.43 KG/M2

## 2018-01-14 VITALS — BODY MASS INDEX: 47.14 KG/M2 | WEIGHT: 315 LBS

## 2018-01-14 VITALS
WEIGHT: 315 LBS | HEART RATE: 64 BPM | SYSTOLIC BLOOD PRESSURE: 130 MMHG | DIASTOLIC BLOOD PRESSURE: 62 MMHG | BODY MASS INDEX: 40.43 KG/M2 | HEIGHT: 74 IN

## 2018-01-15 VITALS
DIASTOLIC BLOOD PRESSURE: 50 MMHG | SYSTOLIC BLOOD PRESSURE: 122 MMHG | WEIGHT: 315 LBS | RESPIRATION RATE: 18 BRPM | HEART RATE: 68 BPM | HEIGHT: 74 IN | BODY MASS INDEX: 40.43 KG/M2

## 2018-01-15 VITALS
HEIGHT: 74 IN | DIASTOLIC BLOOD PRESSURE: 84 MMHG | SYSTOLIC BLOOD PRESSURE: 136 MMHG | HEART RATE: 80 BPM | BODY MASS INDEX: 40.43 KG/M2 | WEIGHT: 315 LBS

## 2018-01-15 VITALS
HEART RATE: 71 BPM | DIASTOLIC BLOOD PRESSURE: 84 MMHG | BODY MASS INDEX: 40.43 KG/M2 | OXYGEN SATURATION: 91 % | TEMPERATURE: 98.1 F | WEIGHT: 315 LBS | HEIGHT: 74 IN | SYSTOLIC BLOOD PRESSURE: 134 MMHG

## 2018-01-15 VITALS
HEART RATE: 66 BPM | SYSTOLIC BLOOD PRESSURE: 119 MMHG | DIASTOLIC BLOOD PRESSURE: 56 MMHG | WEIGHT: 315 LBS | BODY MASS INDEX: 46.61 KG/M2

## 2018-01-15 VITALS
BODY MASS INDEX: 40.43 KG/M2 | WEIGHT: 315 LBS | HEIGHT: 74 IN | HEART RATE: 56 BPM | RESPIRATION RATE: 16 BRPM | SYSTOLIC BLOOD PRESSURE: 90 MMHG | DIASTOLIC BLOOD PRESSURE: 56 MMHG

## 2018-01-15 NOTE — PROGRESS NOTES
Plan    1  DSMT/MNT Time Record; Status:Complete;   Done: [id number] 01:02PM    Discussion/Summary    PATIENT EDUCATION RECORD He has Memory issues barriers to learning  Healthy Eating: Response: His current weight is 362 pounds  Discussed Food recall  Method: Instruction  Response: Verbalizes Understanding He was given the following educational materials:  Provided patient with written goals from this visit  Chief Complaint  Patient with T2DM, hyperlipidemia and obesity seen for diet follow-up visit  History of Present Illness  Weight at today's visit was 362 pounds  Patient states, "Things are not going well  Alzheimers is making me forget a lot of things  I missed taking my insulin a half a dozen times last week  I don't get any help at home"  Unable to assess intake as patient does not keep food logs  Patient reports "cutting back drastically" on his sweetened iced tea intake to one cup a day  Steve Mock is drinking Crystal Light the majority of the time  Patient has also stopped fast food consumption at lunch time (he does still consume fast food at dinner)  Would anticipate a greater weight loss with the reported changes that were made  Patient states, "I think the medication I am taking causes water retention  That is why it looks like I didn't lose any weight  Food record reveals excess calories coming from refined carbohydrates (1 Tbsp sugar in coffee and regular iced tea) , fruit (will snack on 2 cups of watermelon between meals), and fast food  Advised patient to eliminate the intake of fast food, sugar, and sugar sweetened beverages to assist with glycemic control  Patient reports MD suggested water activities as safe exercise  Patient verbalized he is planning on joining the [facility] so he can begin swimming  Patient will call to schedule follow-up in 2 months  RD will remain available for further dietary questions/concerns   Steve Mock may benefit from contacting the [facility] agency on aging to seek out assistance for individuals with progressing Alzheimers  This is his follow-up assessment   Present at session: patient    He has special learning needs: Patient has memory issues  Exercise routine:  Patient has not initiated exercise  He eats breakfast at  7:30-8:00AM AM Scrambled eggs, 5 potato tots, coffee with 1 Tbsp sugar    He eats lunch at  12:15PM PM Leftovers: 3oz meatloaf, 1/2 cup mashed potatoes, 1/2 cup corn or peas OR ham and cheese sandwich with 1 Tbsp mayonnaise and 1 orange    He eats dinner at  6:00PM PM Fast food/take out i e  1 slice of pizza and 5 wings, Crystal Light or 12" meatball sub or tuna sub   He snacks at None   Medical Nutrition Therapy Intervention: Food recall, Refined Carbs/Fast food intake   His comprehension was fair   His motivation was fair   His compliance was fair   Goals:  1  Avoid sugar and sugar sweetened beverages  2  Limit fast food intake to once a month  3  Set phone alarm as a reminder to take insulin before meals  Vitals  Signs   Recorded: [id number] 02:24PM   Weight: 362 lb   BMI Calculated: 46 48  BSA Calculated: 2 8    Results/Data  DSMT/MNT Time Record [id number] 01:02PM Yeni Mage     Test Name Result Flag Reference   Date of Service 9/20/2016     Start - Stop Time 11:10-11:40AM     Total MInutes 30 minutes     Group Or Individual Instruction MNT-I       Future Appointments    Date/Time Provider Specialty Site   11/15/2016 09:40 AM SALVADOR Alas   Cardiology  CARDIOLOGY  [place]   10/11/2016 03:00 PM [facility]   11/01/2016 11:00 AM [facility]   10/13/2016 01:30 PM Lakshmi Watters [facility]     Signatures   Electronically signed by : Leticia [facility]; Sep 20 2016  5:48PM EST                       (Author)    Electronically signed by : SALVADOR Machado ; Sep 22 2016 1:40PM EST

## 2018-01-15 NOTE — MISCELLANEOUS
Provider Comments  Provider Comments:   PATIENT NO SHOWED FOR 11-1-2016 APPT  Signatures   Electronically signed by :  Rosemary Linares; Nov 1 2016 11:18AM EST                       (Author)

## 2018-01-15 NOTE — PROGRESS NOTES
History of Present Illness  HPI: Patient here for Breanne Pro CGM placement as ordered by Dr Taurus Arenas  Active Problems    1  Abdominal pain (789 00) (R10 9)   2  Abnormal laboratory test (796 4) (R89 9)   3  Angina pectoris (413 9) (I20 9)   4  Arteriosclerosis of coronary artery (414 00) (I25 10)   5  Ascites (789 59) (R18 8)   6  Atherosclerosis (440 9) (I70 90)   7  Atherosclerosis of arteries of extremities (440 20) (I70 209)   8  Benign essential hypertension (401 1) (I10)   9  Benign prostatic hyperplasia (BPH) with post-void dribbling (600 01,788 35)   (N40 1,N39 43)   10  Brain aneurysm (437 3) (I67 1)   11  Breast pain (611 71) (N64 4)   12  Bronchitis (490) (J40)   13  Chest pain (786 50) (R07 9)   14  Chronic constipation (564 00) (K59 09)   15  Chronic diastolic CHF (congestive heart failure) (428 32,428 0) (I50 32)   16  Chronic kidney disease, stage 3 (585 3) (N18 3)   17  Chronic migraine without aura (346 70) (G43 709)   18  CVA (cerebral vascular accident) (434 91) (I63 9)   19  Depression (311) (F32 9)   20  Dysphagia (787 20) (R13 10)   21  Edema (782 3) (R60 9)   22  Epigastric pain (789 06) (R10 13)   23  GERD without esophagitis (530 81) (K21 9)   24  Hiatal hernia (553 3) (K44 9)   25  History of aneurysm (V12 59) (Z86 79)   26  History of CVA (cerebrovascular accident) (V12 54) (Z86 73)   32  Hypercholesterolemia (272 0) (E78 00)   28  Hypokalemia (276 8) (E87 6)   29  Insomnia (780 52) (G47 00)   30  Joint pain, knee (719 46) (M25 569)   31  Lumbago (724 2) (M54 5)   32  Medication overuse headache (339 3) (G44 40)   33  Memory disturbance (780 93) (R41 3)   34  Morbid or severe obesity due to excess calories (278 01) (E66 01)   35  Need for immunization against influenza (V04 81) (Z23)   36  Obstructive sleep apnea (327 23) (G47 33)   37  Other chronic pain (338 29) (G89 29)   38  Peripheral neuropathy (356 9) (G62 9)   39  Preop examination (V72 84) (Z01 818)   40   Preoperative cardiovascular examination (V72 81) (Z01 810)   41  Shortness of breath (786 05) (R06 02)   42  Stabbing headache (339 85) (G44 85)   43  Status post cardiac catheterization (V45 89) (Z98 890)   44  Tear of medial meniscus of right knee (836 0) (S83 241A)   45  Uncontrolled type 2 diabetes mellitus, with long-term current use of insulin    (250 02,V58 67) (E11 65,Z79 4)   46  Venous Intermittent Claudication (453 89)   47  Ventricular bigeminy (427 89) (I49 9)   48  Vitamin D deficiency (268 9) (E55 9)   49  Wheezing on auscultation (786 07) (R06 2)    Current Meds   1  Advair Diskus 250-50 MCG/DOSE Inhalation Aerosol Powder Breath Activated; Therapy: 66SEH4044 to Recorded   2  Aldactone 25 MG Oral Tablet; Take 1 tablet daily; Therapy: 74Rqd8115 to Recorded   3  Amitiza 24 MCG Oral Capsule; TAKE ONE CAPSULE BY MOUTH TWICE A DAY; Therapy: 34KNR0400 to (Evaluate:50Ejf0794)  Requested for: 69AYU8240; Last   Rx:26Nov2014 Ordered   4  AmLODIPine Besylate 2 5 MG Oral Tablet; TAKE 1 TABLET DAILY; Therapy: 43FUX1921 to (Sebas Perez)  Requested for: 10JQL3719; Last   Rx:03Oct2017 Ordered   5  Aspirin 81 MG TABS; TAKE 1 TABLET DAILY; Therapy: 46SXE3650 to (Evaluate:88Wom4077) Recorded   6  Atorvastatin Calcium 40 MG Oral Tablet; TAKE 1 TABLET AT BEDTIME; Therapy: 69WWL8885 to (0699 164 39 35)  Requested for: 32PMG6359; Last   Rx:78Gsn7571 Ordered   7  Baclofen 10 MG Oral Tablet; TAKE 1 TABLET 3 times daily; Therapy: 28BKN6403 to (Evaluate:10Mar2015)  Requested for: 41Wrj4010; Last   Rx:65Ket6618 Ordered   8  Bystolic 10 MG Oral Tablet; take 1 tablet by mouth daily; Therapy: 95KCH1476 to (Evaluate:08Ckp9711)  Requested for: 24RMI9606; Last   Rx:15Mar2017 Ordered   9  Citalopram Hydrobromide 40 MG Oral Tablet; TAKE 1 TABLET BY MOUTH EVERY DAY; Therapy: 85GOR8994 to (Bari Solano)  Requested for: 61GXX5418; Last   Rx:26Nov2014 Ordered   10   Clopidogrel Bisulfate 75 MG Oral Tablet; take 1 tablet by mouth every day; Therapy: 60CFJ6061 to (Evaluate:49Xiv5701)  Requested for: 93NSD7172; Last    Rx:11Eyh2776 Ordered   11  Dicyclomine HCl - 10 MG Oral Capsule; TAKE 1 CAPSULE EVERY 6 HOURS AS    NEEDED; Therapy: 46ZQO5899 to (Evaluate:54Kho5763)  Requested for: 23GCX8376; Last    AD:26SFE7913 Ordered   12  Flomax 0 4 MG Oral Capsule; TAKE 1 CAPSULE Daily; Therapy: 41BJT8983 to Recorded   13  Gabapentin 800 MG Oral Tablet; Take 2 tablets daily as directed; Therapy: 27Jxv2371 to Recorded   14  Jardiance 25 MG Oral Tablet; 1 Tab daily; Therapy: 06CZH6269 to (Evaluate:06Oct2018); Last Rx:45Pel8082 Ordered   15  Memantine HCl - 10 MG Oral Tablet; one tab in am for 10 days then twice a day afterthat; Therapy: 23ROO2442 to (Last Rx:98Tdy1102)  Requested for: 05Axx0670 Ordered   16  MetOLazone 5 MG Oral Tablet; 1 tablet weekly; Therapy: 95Bwy5905 to Recorded   17  MS Contin 100 MG Oral Tablet Extended Release; 1 TABLET TWICE DAILY; Therapy: 80FAG5151 to Recorded   18  Nitrostat 0 4 MG Sublingual Tablet Sublingual; TAKE 1 TABLET SUBLINGUALLY AS    NEEDED; Therapy: 10MPA3104 to (Evaluate:54Vqw4296)  Requested for: 72GCN6966; Last    Rx:84Bmn8404 Ordered   19  NovoLOG FlexPen 100 UNIT/ML Subcutaneous Solution Pen-injector; 25 units with    breakfast and lunch and 30 units before dinner  Requested for: 27Jun2017;    Last ML:46TUS5138 Ordered   20  Omeprazole 20 MG Oral Tablet Delayed Release; 1 PO BID; Therapy: 68IKR9440 to (Last Rx:76Dvy4204)  Requested for: 94Ghd6075 Ordered   21  OxyCODONE HCl - 30 MG Oral Tablet; Therapy: 78USQ1609 to (Last Rx:61Azi6568)  Requested for: 23Leb2734 Ordered   22  Potassium Chloride ER 20 MEQ Oral Tablet Extended Release; 1 tablet 3 times daily; Therapy: 27Mjo3221 to Recorded   23  Qvar 80 MCG/ACT Inhalation Aerosol Solution; INHALE 1 PUFF TWICE DAILY; Therapy: 62BXE5402 to (Last Rx:26Mar2016)  Requested for: 17VKK5330 Ordered   24   Ranexa 1000 MG Oral Tablet Extended Release 12 Hour; take 1 tablet every 12 hours; Therapy: 20MRH2798 to (Madyson Louis)  Requested for: 05VBV4235; Last    Rx:40Xrv3914 Ordered   25  Topiramate 100 MG Oral Tablet; TAKE 1 TABLET IN AM and Two at bedtime; Therapy: 92JJO3146 to (Evaluate:01Jun2018)  Requested for: 80JYL8985; Last    WV:25DCW7642 Ordered   26  Torsemide 100 MG Oral Tablet; 2 5 tablets daily in divided dosages; Therapy: 40Ytz4636 to (Evaluate:11Mar2018)  Requested for: 03Loj4014; Last    Rx:48Swc9940 Ordered   27  TraZODone HCl - 100 MG Oral Tablet; TAKE 1-3 TABLETS AT BEDTIME AS NEEDED    FOR DIFFICULTY SLEEPING; Therapy: 04PQS3769 to (Evaluate:82Djl9289)  Requested for: 77EVK8442; Last    Rx:26Nov2014 Ordered   28  Priti Belts FlexTouch 200 UNIT/ML Subcutaneous Solution Pen-injector; 110 units daily at    bedtime; Therapy: 59AML1149 to (Evaluate:18Apr2018)  Requested for: 16KJB4565; Last    Rx:24Cxh8665 Ordered   34  Vitamin D3 5000 UNIT Oral Capsule; 1 Cap daily; Therapy: 25HUR9932 to (Chandler Garner)  Requested for: 84RYX7503; Last    Rx:11Oct2017 Ordered   27  Zolpidem Tartrate 10 MG Oral Tablet; TAKE 1 TABLET AT BEDTIME AS NEEDED FOR    SLEEP; Therapy: 63WUX6123 to (Evaluate:19Nov2014)  Requested for: 09QPV3045; Last    QN:89ZIJ8354; Status: ACTIVE - Renewal Voided Ordered    Allergies    1  No Known Drug Allergies    2  No Known Environmental Allergies   3  No Known Food Allergies    Plan  Sensor placed at visit today on right arm  Consent reviewed and signed  Scanned documents  Future Appointments    Date/Time Provider Specialty Site   11/09/2017 11:00 AM SALVADOR Alvares  Endocrinology [facility] ENDOCRINOLOGY   01/16/2018 02:00 PM SALVADOR Alvares  Endocrinology [facility] ENDOCRINOLOGY   11/09/2017 10:00 AM Endocrinology, Nurse Schedule  [facility] ENDOCRINOLOGY   10/31/2017 11:40 AM SALVADOR Arreguin   Cardiology  CARDIOLOGY  [place]   10/30/2017 12:00 PM Alla Villegas MD Neurology [facility] NEUROLOGY [facility]   01/22/2018 09:30 AM Fredrik Spatz, MD Neurology [facility] 21     Signatures   Electronically signed by : Max Cross, [address]; Oct 26 2017 10:20AM EST                       (Author)    Electronically signed by : SALVADOR Blanca ; Oct 26 2017 11:19AM EST                       (Author)

## 2018-01-16 ENCOUNTER — GENERIC CONVERSION - ENCOUNTER (OUTPATIENT)
Dept: OTHER | Facility: OTHER | Age: 58
End: 2018-01-16

## 2018-01-16 DIAGNOSIS — I10 ESSENTIAL (PRIMARY) HYPERTENSION: ICD-10-CM

## 2018-01-16 DIAGNOSIS — E55.9 VITAMIN D DEFICIENCY: ICD-10-CM

## 2018-01-16 DIAGNOSIS — E78.00 PURE HYPERCHOLESTEROLEMIA: ICD-10-CM

## 2018-01-16 DIAGNOSIS — E11.65 TYPE 2 DIABETES MELLITUS WITH HYPERGLYCEMIA (HCC): ICD-10-CM

## 2018-01-16 NOTE — RESULT NOTES
Discussion/Summary   kidney function test bun/cr - high - could be dehydrated - stop jardiance for now , keep well hydrated   potassium is low - is he taking any supplementations ? Verified Results  (1) BASIC METABOLIC PROFILE [id number] 08:11AM [facility] Order Number: [id number]     Test Name Result Flag Reference   SODIUM 139 mmol/L  136-145   POTASSIUM 2 9 mmol/L L 3 5-5 3   CHLORIDE 102 mmol/L  100-108   CARBON DIOXIDE 32 mmol/L  21-32   ANION GAP (CALC) 5 mmol/L  4-13   BLOOD UREA NITROGEN 36 mg/dL H 5-25   CREATININE 1 99 mg/dL H 0 60-1 30   Standardized to IDMS reference method   CALCIUM 8 7 mg/dL  8 3-10 1   eGFR 37 ml/min/1 73sq m     National Kidney Disease Education Program recommendations are as follows:  GFR calculation is accurate only with a steady state creatinine  Chronic Kidney disease less than 60 ml/min/1 73 sq  meters  Kidney failure less than 15 ml/min/1 73 sq  meters  GLUCOSE FASTING 177 mg/dL H 65-99   Specimen collection should occur prior to Sulfasalazine administration due to the potential for falsely depressed results  Specimen collection should occur prior to Sulfapyridine administration due to the potential for falsely elevated results

## 2018-01-16 NOTE — RESULT NOTES
Verified Results  (1) CBC/ PLT (NO DIFF) 24IIK9896 07:12AM Ammie Klinefelter     Test Name Result Flag Reference   HEMATOCRIT 37 3 %  36 5-49 3   HEMOGLOBIN 11 5 g/dL L 12 0-17 0   MCHC 30 8 g/dL L 31 4-37 4   MCH 27 6 pg  26 8-34 3   MCV 89 fL  82-98   PLATELET COUNT 657 Thousands/uL  149-390   RBC COUNT 4 17 Million/uL  3 88-5 62   RDW 17 1 % H 11 6-15 1   WBC COUNT 11 44 Thousand/uL H 4 31-10 16   MPV 10 5 fL  8 9-12 7

## 2018-01-17 NOTE — MISCELLANEOUS
Message   Date: 10 May 2017 10:12 AM EST, Recorded By: Marianna Toledo: Gemma Gómez, Self   Phone: [phone] (Home), [phone] (Work)   PC from patient, leaving a message on triage line, that he has a horrible cold, feeing very sick with nausea and diarrhea  The cold is causing increased chest pain, over and beyond, his usual chest pain  (see Dr Amador Sagastume last office note)  He called his [facility] PCP for advisement, and was directed to go to an ER to determine CP vs Congestion  His message states he is in route to [facility]  Called patient back on cell, and he was pulling into ER parking lot  Told patient that our group would be called in for a consult, if cardiac issues were present  Active Problems    1  Angina pectoris (413 9) (I20 9)   2  Arteriosclerosis of coronary artery (414 00) (I25 10)   3  Ascites (789 59) (R18 8)   4  Atherosclerosis (440 9) (I70 90)   5  Atherosclerosis of arteries of extremities (440 20) (I70 209)   6  Benign essential hypertension (401 1) (I10)   7  Benign prostatic hyperplasia (BPH) with post-void dribbling (600 01,788 35)   (N40 1,N39 43)   8  Blurry vision (368 8) (H53 8)   9  Brain aneurysm (437 3) (I67 1)   10  Breast pain (611 71) (N64 4)   11  Bronchitis (490) (J40)   12  Chest pain (786 50) (R07 9)   13  Chronic constipation (564 00) (K59 09)   14  Chronic diastolic CHF (congestive heart failure) (428 32,428 0) (I50 32)   15  Chronic migraine without aura (346 70) (G43 709)   16  Chronic tension type headache (339 12) (G44 229)   17  CVA (cerebral vascular accident) (434 91) (I63 9)   18  Dementia without behavioral disturbance (294 20) (F03 90)   19  Depression (311) (F32 9)   20  Diabetes mellitus type 2, uncontrolled (250 02) (E11 65)   21  Diabetes mellitus, type 2 (250 00) (E11 9)   22  Dysphagia (787 20) (R13 10)   23  Edema (782 3) (R60 9)   24  Epigastric pain (789 06) (R10 13)   25  Facial Nerve Injury (351 9)   26   GERD without esophagitis (530 81) (K21 9)   27  Hiatal hernia (553 3) (K44 9)   28  History of aneurysm (V12 59) (Z86 79)   29  History of CVA (cerebrovascular accident) (V12 54) (Z86 73)   30  Hypercholesterolemia (272 0) (E78 00)   31  Hypokalemia (276 8) (E87 6)   32  Hypothyroidism (244 9) (E03 9)   33  Insomnia (780 52) (G47 00)   34  Joint pain, knee (719 46) (M25 569)   35  Lumbago (724 2) (M54 5)   36  Medication overuse headache (339 3) (G44 40)   37  Memory disturbance (780 93) (R41 3)   38  Mild cognitive impairment (331 83) (G31 84)   39  Morbid or severe obesity due to excess calories (278 01) (E66 01)   40  Need for immunization against influenza (V04 81) (Z23)   41  Obstructive sleep apnea (327 23) (G47 33)   42  Other chronic pain (338 29) (G89 29)   43  Peripheral neuropathy (356 9) (G62 9)   44  Preop examination (V72 84) (Z01 818)   45  Preoperative cardiovascular examination (V72 81) (Z01 810)   46  Shortness of breath (786 05) (R06 02)   47  Stabbing headache (339 85) (G44 85)   48  Status post cardiac catheterization (V45 89) (Z98 890)   49  Tear of medial meniscus of right knee (836 0) (S83 241A)   50  Venous Intermittent Claudication (453 89)   51  Vitamin D deficiency (268 9) (E55 9)   52  Wheezing on auscultation (786 07) (R06 2)    Current Meds   1  Advair Diskus 250-50 MCG/DOSE Inhalation Aerosol Powder Breath Activated; Therapy: 80ECK9386 to Recorded   2  Amitiza 24 MCG Oral Capsule; TAKE ONE CAPSULE BY MOUTH TWICE A DAY; Therapy: 30WCC0738 to (Evaluate:65Vul0182)  Requested for: 57HVV8301; Last   Rx:26Nov2014 Ordered   3  AmLODIPine Besylate 2 5 MG Oral Tablet; TAKE 1 TABLET DAILY; Therapy: 11GIU8262 to ([phone])  Requested for: 71Tyz6038; Last   Rx:62Lsv5865 Ordered   4  Aspirin 81 MG TABS; TAKE 1 TABLET DAILY; Therapy: 20PGA0482 to (Evaluate:69Pok7715) Recorded   5  Atorvastatin Calcium 40 MG Oral Tablet; TAKE 1 TABLET AT BEDTIME;    Therapy: 37ZER6286 to ([phone])  Requested for: 96ZFI5769; Last   Rx:67Fhq8665 Ordered   6  Baclofen 10 MG Oral Tablet; TAKE 1 TABLET 3 times daily; Therapy: 49FBL1731 to (Evaluate:65Nva8332)  Requested for: 63Lws0606; Last   Rx:24Huy0728 Ordered   7  Bystolic 10 MG Oral Tablet; take 1 tablet by mouth daily; Therapy: 00VAE0552 to (Evaluate:49Mhm8924)  Requested for: 47TBP9846; Last   Rx:38Wrf6370 Ordered   8  Citalopram Hydrobromide 40 MG Oral Tablet; TAKE 1 TABLET BY MOUTH EVERY DAY; Therapy: 42LHW7965 to ([place])  Requested for: 03GPC8606; Last   Rx:53Zff6290 Ordered   9  Clopidogrel Bisulfate 75 MG Oral Tablet (Plavix); take 1 tablet by mouth every day; Therapy: 35EHJ3361 to (Evaluate:97Zfs3837)  Requested for: 80ZBG9113; Last   Rx:93Lsz4665 Ordered   10  Donepezil HCl - 5 MG Oral Tablet; one in am for two weeks and then two afterthat; Therapy: 21XSV7610 to (Last Rx:17Vfu5318)  Requested for: 31Key0606 Ordered   11  Flomax 0 4 MG Oral Capsule (Tamsulosin HCl); TAKE 1 CAPSULE Daily; Therapy: 90TJC2024 to Recorded   12  Gabapentin 300 MG Oral Capsule; one in am and two at bedtime; Therapy: 90MHC8266 to (Last Rx:69Vti7151)  Requested for: 68Ozz7587 Ordered   13  MS Contin 100 MG Oral Tablet Extended Release (Morphine Sulfate ER); i po tid; Therapy: 71KRT1200 to Recorded   14  Nitrostat 0 4 MG Sublingual Tablet Sublingual (Nitroglycerin); TAKE 1 TABLET    SUBLINGUALLY AS NEEDED; Therapy: 13JEC4331 to (Evaluate:45Lps5354)  Requested for: 28NVO0317; Last    Rx:77Ckp8438 Ordered   15  NovoLOG FlexPen 100 UNIT/ML Subcutaneous Solution Pen-injector; INJECT 20 UNIT    3 times daily before meals Recorded   16  Omeprazole 20 MG Oral Tablet Delayed Release; 1 PO BID; Therapy: 20XSP7233 to (Last Rx:38Ybw2534)  Requested for: 41VDV3049 Ordered   17  OxyCODONE HCl - 30 MG Oral Tablet; Therapy: 91EWO5863 to (Last Rx:02Apr2011)  Requested for: 02Apr2011 Ordered   18   Polyethylene Glycol 3350 Oral Powder; MIX 1 CAPFUL (17GM) IN 8 OZ OF WATER,JUICE OR TEA AND DRINK DAILY; Therapy: 20KJH4773 to (Evaluate:71Vyw5434)  Requested for: 80AFW1025; Last    Rx:26Nov2014 Ordered   19  Potassium Chloride ER 10 MEQ Oral Capsule Extended Release; TAKE 1 CAPSULE    TWICE DAILY WITH MEALS; Therapy: 89SXT6711 to [phone])  Requested for: 20Tzf2990; Last    Rx:91Miu4423 Ordered   20  Qvar 80 MCG/ACT Inhalation Aerosol Solution; INHALE 1 PUFF TWICE DAILY; Therapy: 15MJE0258 to (Last Rx:26Mar2016)  Requested for: 71YVR7423 Ordered   21  Ranexa 1000 MG Oral Tablet Extended Release 12 Hour; take 1 tablet every 12 hours; Therapy: 62YEG0145 to (Evaluate:76Lbw0762)  Requested for: 12BMS3975; Last    Rx:15Mar2017 Ordered   22  Topiramate 100 MG Oral Tablet; take 1 tablet by mouth twice a day; Therapy: 40TZZ4347 to (Evaluate:01Mrn1074)  Requested for: 18Tvi3440; Last    Rx:58Kes2639 Ordered   23  Torsemide 100 MG Oral Tablet; One po BID; Therapy: 54Kjy3685 to ([place] Free) Recorded   24  Toujeo SoloStar 300 UNIT/ML Subcutaneous Solution Pen-injector; 75 Units SQ QHS; Therapy: 03CNF6655 to (WXPHJXWE:65LQJ6422)  Requested for: 95Cfl3440; Last    Rx:26Bbp9030 Ordered   25  TraZODone HCl - 100 MG Oral Tablet; TAKE 1-3 TABLETS AT BEDTIME AS NEEDED    FOR DIFFICULTY SLEEPING; Therapy: 12BWT9919 to (Evaluate:62Hjo1314)  Requested for: 30JIG6907; Last    Rx:26Nov2014 Ordered   26  Vitamin D (Ergocalciferol) 31605 UNIT Oral Capsule; TAKE 1 CAPSULE WEEKLY; Therapy: 85IXJ4204 to ([phone])  Requested for: 07JPX4783; Last    Rx:09Mar2017 Ordered   27  Zolpidem Tartrate 10 MG Oral Tablet; TAKE 1 TABLET AT BEDTIME AS NEEDED FOR    SLEEP; Therapy: 12XUG7861 to (Evaluate:19Nov2014)  Requested for: 46DJY9707; Last    OE:71NGL8567; Status: ACTIVE - Renewal Voided Ordered    Allergies    1  No Known Drug Allergies    2  No Known Environmental Allergies   3   No Known Food Allergies    Signatures   Electronically signed by : Portia Herrmann, ; May 10 2017 10:33AM EST                       (Administrative)

## 2018-01-18 NOTE — MISCELLANEOUS
History of Present Illness  A call was made to the patient/patient's family  HFCC Additional Notes: Call to patient who had contacted his PCP [facility] to have blood work done today because of feelings like his K level was low  No c/o HF s/s  Chest pressure at time with relief with NTG  Future Appointments    Date/Time Provider Specialty Site   10/11/2017 01:40 PM SALVADOR Pickens  Endocrinology [facility] ENDOCRINOLOGY   10/12/2017 12:30 PM Regina Griffith MD Neurology [facility] NEUROLOGY [address]   10/31/2017 11:40 AM SALVADOR Hough   Cardiology  CARDIOLOGY  [place]   10/30/2017 12:00 PM Nidia Ma MD Neurology [facility] 21   01/22/2018 09:30 AM Nidia Ma MD Neurology [facility] 21     Signatures   Electronically signed by : Tremaine Wooten, ; Oct  4 2017  4:34PM EST                       (Author)

## 2018-01-22 ENCOUNTER — ALLSCRIPTS OFFICE VISIT (OUTPATIENT)
Dept: OTHER | Facility: OTHER | Age: 58
End: 2018-01-22

## 2018-01-22 VITALS
WEIGHT: 315 LBS | DIASTOLIC BLOOD PRESSURE: 57 MMHG | SYSTOLIC BLOOD PRESSURE: 116 MMHG | HEART RATE: 53 BPM | HEIGHT: 74 IN | BODY MASS INDEX: 40.43 KG/M2

## 2018-01-23 NOTE — PROGRESS NOTES
Assessment   1  Chronic migraine without aura (346 70) (G43 709)   2  Memory disturbance (780 93) (R41 3)   3  Vitamin D deficiency (268 9) (E55 9)    Plan   Vitamin D deficiency    · (1) VITAMIN D 25-HYDROXY; Status:Active; Requested for:22Jan2018; Perform:[facility]; ZZB:10WVT7182;ERXIEBV; For:Vitamin D deficiency; Ordered By:Faiza Grossman;   · Follow-up visit in 6 months Evaluation and Treatment  Follow-up  Status: Complete     Done: 92UNY5545   Ordered; For: Vitamin D deficiency; Ordered By: Earnestine Contreras Performed:  Due: 02NGE4951; Last Updated By: Funmi Stubbs; 1/22/2018 10:15:48 AM    Chief Complaint   Chief Complaint Free Text Note Form: Pt is here for a h/a, memory f/u      History of Present Illness   HPI: We had the pleasure of evaluating Zuly Roman in neurological follow up today  As you know he is a 49year old right handed male  He his here today for evaluation of his headaches and memory loss  is going for gastric sleeve in the end of march of 2018  Migraine headaches:    medications- citalopram, amlodipine, topamax, labetalol, lexapro, gabapentin, notriptyline, oxycodone, opnana, motrin no triptan due to chest pain  Triggers- stress/tension Aura- none   last seen headaches have improved with Botox injections q 3 months  Since starting botox, the patient reports greater than 7 days of migraine relief from baseline, correlated with headache diary, decreased abortive medication use and decreased ER visits  often: 2-3 headaches per week  bilateral orbits, apex, bilateral occipital  sharp, throbbing average pain level 7-8/10 with- blurred vision, photophobia, phonophobia    Continues to have problem with short term memory  States over time he feels it is getting worse  He drove here today and states as long as he has the gps he is fine  No very active during the day  Forgets to take his medication and at time not able to just do his daily actives, such as bathing  Namenda 10mg twice a day  Neuropathy:  for many years now he has tingling sensation in his feet  Especially on the tip of his toes  Symptoms seem to be worse in pm or at bedtime  On gabapentin 800mg twice a day  reviewed          Review of Systems   Neurological ROS:      Constitutional: fatigue-- and-- appetite changes  HEENT: dysphagia  Cardiovascular:  no chest pain or pressure, no palpitations present, the heart rate was not rapid or irregular, no swelling in the arms or legs, no poor circulation  Respiratory:  no unusual or persistant cough, no shortness of breath with or without exertion  Gastrointestinal:  no nausea, no vomiting, no diarrhea, no abdominal pain, no changes in bowel habits, no melena, no loss of bowel control  Genitourinary:  no incontinence, no feelings of urinary urgency, no increase in frequency, no urinary hesitancy, no dysuria, no hematuria  Musculoskeletal: myalgias,-- immobility or loss of function,-- head/neck/back pain-- and-- pain while walking  Integumentary  no masses, no rash, no skin lesions, no livedo reticularis  Psychiatric: mood swings  Endocrine loss of sexual ability or drive -- and-- erection difficulty  Hematologic/Lymphatic:  no unusual bleeding, no tendency for easy bruising, no clotting skin or lumps  Neurological General: headache,-- increased sleepiness-- and-- waking up at night  Neurological Mental Status: confusion-- and-- memory problems  Neurological Cranial Nerves:  no blurry or double vision, no loss of vision, no face drooping, no facial numbness or weakness, no taste or smell loss/changes, no hearing loss or ringing, no vertigo or dizziness, no dysphagia, no slurred speech  Neurological Motor findings include:  no tremor, no twitching, no cramping(pre/post exercise), no atrophy        Neurological Coordination:  no unsteadiness, no vertigo or dizziness, no clumsiness, no problems reaching for objects  Neurological Sensory:  no numbness, no pain, no tingling, does not fall when eyes closed or taking a shower  Neurological Gait: difficulty walking  ROS Reviewed:    ROS reviewed  Active Problems   1  Abdominal pain (789 00) (R10 9)   2  Abnormal laboratory test (796 4) (R89 9)   3  Angina pectoris (413 9) (I20 9)   4  Arteriosclerosis of coronary artery (414 00) (I25 10)   5  Ascites (789 59) (R18 8)   6  Atherosclerosis (440 9) (I70 90)   7  Atherosclerosis of arteries of extremities (440 20) (I70 209)   8  Benign essential hypertension (401 1) (I10)   9  Benign prostatic hyperplasia (BPH) with post-void dribbling (600 01,788 35)     (N40 1,N39 43)   10  Brain aneurysm (437 3) (I67 1)   11  Breast pain (611 71) (N64 4)   12  Bronchitis (490) (J40)   13  Chest pain (786 50) (R07 9)   14  Chronic constipation (564 00) (K59 09)   15  Chronic diastolic CHF (congestive heart failure) (428 32,428 0) (I50 32)   16  Chronic kidney disease, stage 3 (585 3) (N18 3)   17  Chronic migraine without aura (346 70) (G43 709)   18  CVA (cerebral vascular accident) (434 91) (I63 9)   19  Depression (311) (F32 9)   20  Dysphagia (787 20) (R13 10)   21  Edema (782 3) (R60 9)   22  Epigastric pain (789 06) (R10 13)   23  GERD without esophagitis (530 81) (K21 9)   24  Hiatal hernia (553 3) (K44 9)   25  History of aneurysm (V12 59) (Z86 79)   26  History of CVA (cerebrovascular accident) (V12 54) (Z86 73)   32  Hypercholesterolemia (272 0) (E78 00)   28  Hypokalemia (276 8) (E87 6)   29  Insomnia (780 52) (G47 00)   30  Joint pain, knee (719 46) (M25 569)   31  Lumbago (724 2) (M54 5)   32  Medication overuse headache (339 3) (G44 40)   33  Memory disturbance (780 93) (R41 3)   34  Morbid or severe obesity due to excess calories (278 01) (E66 01)   35  Need for immunization against influenza (V04 81) (Z23)   36  Obstructive sleep apnea (327 23) (G47 33)   37   Other chronic pain (338 29) (G89 29)   38  Peripheral neuropathy (356 9) (G62 9)   39  Preop examination (V72 84) (Z01 818)   40  Preoperative cardiovascular examination (V72 81) (Z01 810)   41  Shortness of breath (786 05) (R06 02)   42  Stabbing headache (339 85) (G44 85)   43  Status post cardiac catheterization (V45 89) (Z98 890)   44  Tear of medial meniscus of right knee (836 0) (S83 241A)   45  Uncontrolled type 2 diabetes mellitus, with long-term current use of insulin      (250 02,V58 67) (E11 65,Z79 4)   46  Venous Intermittent Claudication (453 89)   47  Ventricular bigeminy (427 89) (I49 9)   48  Vitamin D deficiency (268 9) (E55 9)   49  Wheezing on auscultation (786 07) (R06 2)    Past Medical History   1  History of Coronary Artery Disease (V12 59)   2  History of Femoral Nerve Palsy On The Right (355 2)   3  History of acute bronchitis (V12 69) (Z87 09)   4  History of acute sinusitis (V12 69) (Z87 09)   5  History of Need for prophylactic vaccination and inoculation against influenza (V04 81)     (Z23)   6  Old myocardial infarction (412) (I25 2)   7  History of Stroke Syndrome (436)  Active Problems And Past Medical History Reviewed: The active problems and past medical history were reviewed and updated today  Surgical History   1  History of Brain Surgery   2  History of Neurol Drug Infusion Implantation Of Programmable Pump   3  History of Stent Indications: Restenosis At Previous PTCA Location  Surgical History Reviewed: The surgical history was reviewed and updated today  Family History   Mother    1  Family history of Diabetes Mellitus (V18 0)   2  Denied: Family history of colitis   3  Denied: Family history of colonic polyps   4  Denied: Family history of Crohn's disease   5  Denied: Family history of liver disease   6  Denied: Family history of Malignant neoplasm of colon, unspecified part of colon  Father    7  Family history of Coronary Artery Disease (V17 49)   8   Denied: Family history of colitis   9  Denied: Family history of colonic polyps   10  Denied: Family history of Crohn's disease   6  Denied: Family history of liver disease   12  Denied: Family history of Malignant neoplasm of colon, unspecified part of colon  Brother    15  Family history of diabetes mellitus (V18 0) (Z83 3)  Family History    14  Family history of arthritis (V17 7) (Z82 61)   15  Family history of malignant neoplasm (V16 9) (Z80 9)   16  Family history of Father  At Age 39  Family History Reviewed: The family history was reviewed and updated today  Social History    · Being A Social Drinker   · Daily caffeine consumption, 2-3 servings a day   · Denied: History of Drug use   · Never A Smoker  Social History Reviewed: The social history was reviewed and updated today  The social history was reviewed and is unchanged  Current Meds    1  Advair Diskus 250-50 MCG/DOSE Inhalation Aerosol Powder Breath Activated; Therapy: 32LAY8867 to Recorded   2  Aldactone 25 MG Oral Tablet; Take 1 tablet daily; Therapy: 03Pww3380 to Recorded   3  Amitiza 24 MCG Oral Capsule; TAKE ONE CAPSULE BY MOUTH TWICE A DAY; Therapy: 07JNH5368 to (Evaluate:18Snh4454)  Requested for: 29JOU1186; Last     Rx:2014 Ordered   4  AmLODIPine Besylate 2 5 MG Oral Tablet; TAKE 1 TABLET DAILY; Therapy: 13AFW0515 to (Amilcar Seals)  Requested for: 45FJJ7093; Last     Rx:99Mhh7142 Ordered   5  Aspirin 81 MG TABS; TAKE 1 TABLET DAILY; Therapy: 51QNM6777 to (Evaluate:30Gml0844) Recorded   6  Atorvastatin Calcium 40 MG Oral Tablet; TAKE 1 TABLET AT BEDTIME; Therapy: 00OSN2534 to (61 23 68)  Requested for: 40VDC8964; Last     Rx:05Kwa1877 Ordered   7  Baclofen 10 MG Oral Tablet; TAKE 1 TABLET 3 times daily; Therapy: 52OPF3967 to (Evaluate:2015)  Requested for: 54Krs6903; Last     Rx:29Tfm5497 Ordered   8  Bystolic 10 MG Oral Tablet; take 1 tablet by mouth daily;      Therapy: 93YWO4234 to (Evaluate:39Wru0017)  Requested for: 94AMQ7078; Last     Rx:15Mar2017 Ordered   9  Citalopram Hydrobromide 40 MG Oral Tablet; TAKE 1 TABLET BY MOUTH EVERY DAY; Therapy: 46RBR2648 to ([facility])  Requested for: 95OWH8061; Last     Rx:26Nov2014 Ordered   10  Clopidogrel Bisulfate 75 MG Oral Tablet; take 1 tablet by mouth every day; Therapy: 22IEQ0441 to (Evaluate:64Gbf7337)  Requested for: 18GLA0611; Last      Rx:58Fnb6510 Ordered   11  Flomax 0 4 MG Oral Capsule; TAKE 1 CAPSULE Daily; Therapy: 91LWL5395 to Recorded   12  Gabapentin 800 MG Oral Tablet; Take 2 tablets daily as directed; Therapy: 26Mmg6831 to Recorded   13  Memantine HCl - 10 MG Oral Tablet; one tab in am for 10 days then twice a day afterthat; Therapy: 58UZL5268 to (Last Rx:09Jan2018)  Requested for: 15BVV3657 Ordered   14  MetOLazone 5 MG Oral Tablet; 1 tablet weekly; Therapy: 55Lih3097 to Recorded   15  MS Contin 100 MG Oral Tablet Extended Release; 1 TABLET TWICE DAILY; Therapy: 75UPU6761 to Recorded   16  Nitrostat 0 4 MG Sublingual Tablet Sublingual; TAKE 1 TABLET SUBLINGUALLY AS      NEEDED; Therapy: 64WAE4140 to (Evaluate:09Tyq8849)  Requested for: 95EME5543; Last      Rx:04Gbh4585 Ordered   17  NovoLOG FlexPen 100 UNIT/ML Subcutaneous Solution Pen-injector; 34 units with      breakfast and dinner and 30 units before lunch  Requested for: 53ABY4991; Last Rx:16Jan2018 Ordered   18  Omeprazole 20 MG Oral Tablet Delayed Release; 1 PO BID; Therapy: 58ZOR2111 to (Evaluate:12Imm7544)  Requested for: 06Npy0172; Last      Rx:12Wsw8056 Ordered   19  OxyCODONE HCl - 30 MG Oral Tablet; TAKE 1 TABLET EVERY 4 HOURS AS NEEDED      FOR PAIN;      Therapy: 46BDF3434 to  Requested for: 50LWF6878 Recorded   20  Potassium Chloride ER 20 MEQ Oral Tablet Extended Release; TAKE 2 TABLET 3 times      daily; Therapy: 33Ucr0385 to Recorded   21   Qvar 80 MCG/ACT Inhalation Aerosol Solution; INHALE 1 PUFF TWICE DAILY; Therapy: 35FAV8190 to (Last Rx:26Mar2016)  Requested for: 45VLN6191 Ordered   22  Ranexa 1000 MG Oral Tablet Extended Release 12 Hour; take 1 tablet every 12 hours; Therapy: 07BED3964 to (Alaina Bullion)  Requested for: 91CCY2549; Last      Rx:20Oct2017 Ordered   23  Topiramate 100 MG Oral Tablet; TAKE 1 TABLET IN AM and Two at bedtime; Therapy: 37HYG7148 to (Alaina Bullion)  Requested for: 53OIB5528; Last      Rx:30Oct2017 Ordered   24  Torsemide 100 MG Oral Tablet; 2 5 tablets daily in divided dosages; Therapy: 53Zcf6825 to (Evaluate:11Mar2018)  Requested for: 65Mbx9616; Last      Rx:62Jgl7905 Ordered   25  TraZODone HCl - 100 MG Oral Tablet; TAKE 1-3 TABLETS AT BEDTIME AS NEEDED FOR      DIFFICULTY SLEEPING; Therapy: 95GNU4434 to (Evaluate:82Npr0551)  Requested for: 15ZME7326; Last      Rx:26Nov2014 Ordered   26  [place] FlexTouch 200 UNIT/ML Subcutaneous Solution Pen-injector; 116 units daily at      bedtime; Therapy: 47WEJ2789 to ([id number])  Requested for: 27TAT3862; Last      Rx:16Jan2018 Ordered   27  Vitamin D3 5000 UNIT Oral Capsule; 1 Cap daily; Therapy: 18RJG7354 to ([phone])  Requested for: 59QEX1079; Last      Rx:30Oct2017 Ordered   28  Zolpidem Tartrate 10 MG Oral Tablet; TAKE 1 TABLET AT BEDTIME AS NEEDED FOR      SLEEP; Therapy: 59KFV4147 to (Evaluate:19Nov2014)  Requested for: 22CLN9377; Last      CB:58TWO8033; Status: ACTIVE - Renewal Voided Ordered  Medication List Reviewed: The medication list was reviewed and updated today  Allergies   1  No Known Drug Allergies  2  No Known Environmental Allergies   3  No Known Food Allergies    Vitals   Signs   Recorded: 81INT5692 09:40AM   Heart Rate: 74  Systolic: 332  Diastolic: 63  Weight: 053 lb   BMI Calculated: 46 09  BSA Calculated: 2 79    Physical Exam        Constitutional      General appearance: No acute distress, well appearing and well nourished         Eyes Ophthalmoscopic examination: Vision is grossly normal  Gross visual field testing by confrontation shows no abnormalities  EOMI in both eyes  Conjunctivae clear  Eyelids normal palpebral fissures equal  Orbits exhibit normal position  No discharge from the eyes  PERRL  Musculoskeletal walks with a walker  Neurologic      Orientation to person, place, and time: Normal        Recent and remote memory: Demonstrates normal memory   -- MOCHA test 23/30       1st cranial nerve: Normal        2nd cranial nerve: Normal        3rd, 4th, and 6th cranial nerves: Normal        5th cranial nerve: Normal        7th cranial nerve: Normal        8th cranial nerve: Normal        9th cranial nerve: Normal        10th cranial nerve: Normal        11th cranial nerve: Normal        12th cranial nerve: Normal        Sensation: Normal        Future Appointments      Date/Time Provider Specialty Site   01/31/2018 08:40 AM Migule A Miramontes MD Gastroenterology Adult [facility] GASTROENTEROLOGY  [facility]   02/01/2018 12:30 PM Adrián Ferris, [facility] Neurology [address] [facility]     Signatures    Electronically signed by : Brina Rayo MD; Jan 22 2018 10:20AM EST                       (Author)

## 2018-01-24 VITALS
HEART RATE: 60 BPM | BODY MASS INDEX: 40.43 KG/M2 | DIASTOLIC BLOOD PRESSURE: 70 MMHG | WEIGHT: 315 LBS | SYSTOLIC BLOOD PRESSURE: 110 MMHG | HEIGHT: 74 IN

## 2018-01-31 ENCOUNTER — OFFICE VISIT (OUTPATIENT)
Dept: GASTROENTEROLOGY | Facility: MEDICAL CENTER | Age: 58
End: 2018-01-31
Payer: MEDICARE

## 2018-01-31 VITALS
HEART RATE: 61 BPM | SYSTOLIC BLOOD PRESSURE: 122 MMHG | BODY MASS INDEX: 40.43 KG/M2 | WEIGHT: 315 LBS | DIASTOLIC BLOOD PRESSURE: 82 MMHG | HEIGHT: 74 IN | TEMPERATURE: 96.7 F

## 2018-01-31 DIAGNOSIS — K21.9 GASTROESOPHAGEAL REFLUX DISEASE WITHOUT ESOPHAGITIS: Primary | ICD-10-CM

## 2018-01-31 DIAGNOSIS — K59.03 DRUG-INDUCED CONSTIPATION: ICD-10-CM

## 2018-01-31 DIAGNOSIS — R13.19 ESOPHAGEAL DYSPHAGIA: ICD-10-CM

## 2018-01-31 PROCEDURE — 99214 OFFICE O/P EST MOD 30 MIN: CPT | Performed by: INTERNAL MEDICINE

## 2018-01-31 RX ORDER — AMLODIPINE BESYLATE 5 MG/1
5 TABLET ORAL DAILY
Qty: 30 TABLET | Refills: 5 | Status: SHIPPED | OUTPATIENT
Start: 2018-01-31 | End: 2018-09-28 | Stop reason: SDUPTHER

## 2018-01-31 RX ORDER — OMEPRAZOLE 40 MG/1
40 CAPSULE, DELAYED RELEASE ORAL DAILY
Qty: 90 CAPSULE | Refills: 3 | Status: SHIPPED | OUTPATIENT
Start: 2018-01-31 | End: 2018-08-02 | Stop reason: ALTCHOICE

## 2018-01-31 NOTE — LETTER
January 31, 2018     Lashay Winston MD  [address] [place] [address]    Patient: Lilibeth Lynne   YOB: 1960   Date of Visit: 1/31/2018       Dear Dr Estella Loredo:    Thank you for referring Lilibeth Lynne to me for evaluation  Below are my notes for this consultation  If you have questions, please do not hesitate to call me  I look forward to following your patient along with you  Sincerely,        Keith Martinez MD        CC: No Recipients  Keith Martinez MD  1/31/2018 11:40 AM  Sign at close encounter  [facility] - Outpatient Consultation  Lilibeth Lynne 56 y o  male MRN: [id number]  Encounter: [id number]          ASSESSMENT AND PLAN:      1  Gastroesophageal reflux disease without esophagitis  His reflux is well controlled on the omeprazole twice a day  Per his request, I will change him to 40 mg of omeprazole daily  I asked him to take this half an hour before breakfast and reminded him of the lifestyle and dietary changes that will help his symptoms  He is scheduled for bariatric surgery in late March  - omeprazole (PriLOSEC) 40 MG capsule; Take 1 capsule (40 mg total) by mouth daily  Dispense: 90 capsule; Refill: 3    2  Esophageal dysphagia  His dysphagia symptoms have worsened, so I will try increasing his amlodipine dose to 5 mg daily  If this does not help, then I will stop the amlodipine and we can consider other options  - amLODIPine (NORVASC) 5 mg tablet; Take 1 tablet (5 mg total) by mouth daily  Dispense: 30 tablet; Refill: 5    3  Drug-induced constipation  His constipation has responded well to the combination of Amitiza twice a day and occasional MiraLax  I have asked him to continue this regimen     ______________________________________________________________________    HPI:  He presents for follow-up of his reflux, dysphagia, and chronic drug-induced constipation   He said his reflux has been well controlled with omeprazole twice a day  He said his dysphagia had initially improved with the 2 5 mg of amlodipine but now it has recurred  He has found good balance in the treatment of his constipation with Amitiza 24 mcg twice a day along with MiraLax as needed  He said he takes MiraLax approximately twice per week he feels he is having regular bowel movements  He denies any bleeding or weight loss  REVIEW OF SYSTEMS:    CONSTITUTIONAL: Denies any fever, chills, rigors, and weight loss  HEENT: No earache or tinnitus  Denies hearing loss or visual disturbances  CARDIOVASCULAR: No chest pain or palpitations  RESPIRATORY: Denies any cough, hemoptysis, shortness of breath or dyspnea on exertion  GASTROINTESTINAL: As noted in the History of Present Illness  GENITOURINARY: No problems with urination  Denies any hematuria or dysuria  NEUROLOGIC: No dizziness or vertigo, denies headaches  MUSCULOSKELETAL: Denies any muscle or joint pain  SKIN: Denies skin rashes or itching  ENDOCRINE: Denies excessive thirst  Denies intolerance to heat or cold  PSYCHOSOCIAL: Denies depression or anxiety  Denies any recent memory loss         Historical Information   Past Medical History:   Diagnosis Date    Acute on chronic diastolic congestive heart failure (HCC)     Altered gait     Alzheimer disease     per patients,,early onset     [facility]ina p[facility]is [facility] [facility]     Anxiety     Arthritis     Brain aneurysm     Cardiac disease     Chest pain 1/13/2016    Chronic pain     back/ right groin and rle- has morphine pump    Constipation     COPD (chronic obstructive pulmonary disease) (HCC)     Coronary artery disease     CPAP (continuous positive airway pressure) dependence     Dependent on walker for ambulation     w/c for long distance    Depression     Diabetes mellitus (HCC)     insulin dependent    Dizziness     occ    Enlarged prostate     GERD (gastroesophageal reflux disease)     [facility]     Hiatal hernia     Hypercholesterolemia     Hypertension     MI (myocardial infarction)     Migraine     Neuropathy     Oxygen dependent     Q HS  2LPM with CPAP and prn during day 2-3 LPM     Shortness of breath     Sleep apnea     Stented coronary artery     Stroke ([facility] )     vision loss    Urinary frequency     Wears dentures     Wears glasses      Past Surgical History:   Procedure Laterality Date    BACK SURGERY      BRAIN SURGERY      CARDIAC CATHETERIZATION      with stents    CEREBRAL ANEURYSM REPAIR      with coils    COLONOSCOPY      ESOPHAGOGASTRODUODENOSCOPY N/A 7/1/2016    Procedure: ESOPHAGOGASTRODUODENOSCOPY (EGD); Surgeon: Es Albert MD;  [facility]; Service:     HERNIA REPAIR      HIATAL HERNIA REPAIR      KNEE ARTHROSCOPY Right     KNEE ARTHROSCOPY Right     PERONEAL NERVE DECOMPRESSION Right     AL ESOPHAGOGASTRODUODENOSCOPY TRANSORAL DIAGNOSTIC N/A 2/27/2017    Procedure: ESOPHAGOGASTRODUODENOSCOPY (EGD); Surgeon: Es Albert MD;  [facility];   Service: Gastroenterology    AL INSERT SPINE INFUSN [address] [place] [address] N/A 1/19/2017    Procedure: REMOVAL / Gae Meter;  Surgeon: Janell Woodward MD;  [facility];  Service: Orthopedics    AL INSERT SPINE INFUSN [address] [place] [address] N/A 5/16/2016    Procedure: REPLACEMENT AND PROGRAM PUMP ;  Surgeon: Janell Woodward MD;  [facility];  Service: Orthopedics     Social History   History   Alcohol Use No     History   Drug Use No     History   Smoking Status    Never Smoker   Smokeless Tobacco    Never Used     Family History   Problem Relation Age of Onset    Family history unknown: Yes       Meds/Allergies       Current Outpatient Prescriptions:     albuterol (ACCUNEB) 1 25 MG/3ML nebulizer solution    albuterol (PROVENTIL HFA,VENTOLIN HFA) 90 mcg/act inhaler    amLODIPine (NORVASC) 5 mg tablet    aspirin 81 MG tablet    atorvastatin (LIPITOR) 40 mg tablet   baclofen 10 mg tablet    cholecalciferol (VITAMIN D3) 1,000 units tablet    citalopram (CeleXA) 40 mg tablet    clopidogrel (PLAVIX) 75 mg tablet    dicyclomine (BENTYL) 20 mg tablet    Empagliflozin (JARDIANCE) 25 MG TABS    fluticasone-salmeterol (ADVAIR) 250-50 mcg/dose    gabapentin (NEURONTIN) 800 mg tablet    insulin aspart (NOVOLOG FLEXPEN) 100 Units/mL SOPN    Insulin Degludec (TRESIBA FLEXTOUCH) 200 UNIT/ML SOPN    isosorbide mononitrate (IMDUR) 30 mg 24 hr tablet    lubiprostone (AMITIZA) 24 mcg capsule    memantine (NAMENDA) 10 mg tablet    morphine (MS CONTIN) 100 mg 12 hr tablet    nebivolol (BYSTOLIC) 10 mg tablet    nitroglycerin (NITROSTAT) 0 4 mg SL tablet    omeprazole (PriLOSEC) 40 MG capsule    oxyCODONE (ROXICODONE) 30 MG immediate release tablet    polyethylene glycol (MIRALAX) 17 g packet    potassium chloride (K-DUR,KLOR-CON) 10 mEq tablet    ranolazine (RANEXA) 1000 MG SR tablet    tamsulosin (FLOMAX) 0 4 mg    TiZANidine (ZANAFLEX) 2 MG capsule    topiramate (TOPAMAX) 100 mg tablet    torsemide (DEMADEX) 100 mg tablet    traZODone (DESYREL) 100 mg tablet    zolpidem (AMBIEN) 10 mg tablet    No Known Allergies        Objective     Blood pressure 122/82, pulse 61, temperature (!) 96 7 °F (35 9 °C), temperature source Tympanic, height 6' 2" (1 88 m), weight (!) 170 kg (375 lb)  PHYSICAL EXAM:      General Appearance:   Alert, cooperative, no distress   HEENT:   Normocephalic, atraumatic, anicteric      Neck:  Supple, symmetrical, trachea midline   Lungs:   Clear to auscultation bilaterally; no rales, rhonchi or wheezing; respirations unlabored    Heart[de-identified]   Regular rate and rhythm; no murmur, rub, or gallop     Abdomen:   Soft, obese with LUQ tenderness, non-distended; normal bowel sounds; no masses, no organomegaly    Genitalia:   Deferred    Rectal:   Deferred    Extremities:  No cyanosis, clubbing or edema    Pulses:  2+ and symmetric all extremities    Skin:  No jaundice, rashes, or lesions    Lymph nodes:  No palpable cervical lymphadenopathy        Lab Results:   No visits with results within 1 Day(s) from this visit     Latest known visit with results is:   Appointment on 11/22/2017   Component Date Value    Sodium 11/22/2017 141     Potassium 11/22/2017 3 2*    Chloride 11/22/2017 101     CO2 11/22/2017 31     Anion Gap 11/22/2017 9     BUN 11/22/2017 28*    Creatinine 11/22/2017 1 72*    Glucose, Fasting 11/22/2017 169*    Calcium 11/22/2017 8 0*    eGFR 11/22/2017 44

## 2018-01-31 NOTE — PROGRESS NOTES
[facility] - Outpatient Consultation  Manjeet Fulton 56 y o  male MRN: [id number]  Encounter: [id number]          ASSESSMENT AND PLAN:      1  Gastroesophageal reflux disease without esophagitis  His reflux is well controlled on the omeprazole twice a day  Per his request, I will change him to 40 mg of omeprazole daily  I asked him to take this half an hour before breakfast and reminded him of the lifestyle and dietary changes that will help his symptoms  He is scheduled for bariatric surgery in late March  - omeprazole (PriLOSEC) 40 MG capsule; Take 1 capsule (40 mg total) by mouth daily  Dispense: 90 capsule; Refill: 3    2  Esophageal dysphagia  His dysphagia symptoms have worsened, so I will try increasing his amlodipine dose to 5 mg daily  If this does not help, then I will stop the amlodipine and we can consider other options  - amLODIPine (NORVASC) 5 mg tablet; Take 1 tablet (5 mg total) by mouth daily  Dispense: 30 tablet; Refill: 5    3  Drug-induced constipation  His constipation has responded well to the combination of Amitiza twice a day and occasional MiraLax  I have asked him to continue this regimen     ______________________________________________________________________    HPI:  He presents for follow-up of his reflux, dysphagia, and chronic drug-induced constipation  He said his reflux has been well controlled with omeprazole twice a day  He said his dysphagia had initially improved with the 2 5 mg of amlodipine but now it has recurred  He has found good balance in the treatment of his constipation with Amitiza 24 mcg twice a day along with MiraLax as needed  He said he takes MiraLax approximately twice per week he feels he is having regular bowel movements  He denies any bleeding or weight loss  REVIEW OF SYSTEMS:    CONSTITUTIONAL: Denies any fever, chills, rigors, and weight loss  HEENT: No earache or tinnitus   Denies hearing loss or visual disturbances  CARDIOVASCULAR: No chest pain or palpitations  RESPIRATORY: Denies any cough, hemoptysis, shortness of breath or dyspnea on exertion  GASTROINTESTINAL: As noted in the History of Present Illness  GENITOURINARY: No problems with urination  Denies any hematuria or dysuria  NEUROLOGIC: No dizziness or vertigo, denies headaches  MUSCULOSKELETAL: Denies any muscle or joint pain  SKIN: Denies skin rashes or itching  ENDOCRINE: Denies excessive thirst  Denies intolerance to heat or cold  PSYCHOSOCIAL: Denies depression or anxiety  Denies any recent memory loss         Historical Information   Past Medical History:   Diagnosis Date    Acute on chronic diastolic congestive heart failure (HCC)     Altered gait     Alzheimer disease     per patients,,early onset     Angina pectoris ([address] )     Anxiety     Arthritis     Brain aneurysm     Cardiac disease     Chest pain 1/13/2016    Chronic pain     back/ right groin and rle- has morphine pump    Constipation     COPD (chronic obstructive pulmonary disease) (HCC)     Coronary artery disease     CPAP (continuous positive airway pressure) dependence     Dependent on walker for ambulation     w/c for long distance    Depression     Diabetes mellitus (HCC)     insulin dependent    Dizziness     occ    Enlarged prostate     GERD (gastroesophageal reflux disease)     Heart failure (HCC)     Hiatal hernia     Hypercholesterolemia     Hypertension     MI (myocardial infarction)     Migraine     Neuropathy     Oxygen dependent     Q HS  2LPM with CPAP and prn during day 2-3 LPM     Shortness of breath     Sleep apnea     Stented coronary artery     Stroke ([address] )     vision loss    Urinary frequency     Wears dentures     Wears glasses      Past Surgical History:   Procedure Laterality Date    BACK SURGERY      BRAIN SURGERY      CARDIAC CATHETERIZATION      with stents    CEREBRAL ANEURYSM REPAIR      with coils    COLONOSCOPY      ESOPHAGOGASTRODUODENOSCOPY N/A 7/1/2016    Procedure: ESOPHAGOGASTRODUODENOSCOPY (EGD); Surgeon: Nils Moritz, MD;  [facility]; Service:     HERNIA REPAIR      HIATAL HERNIA REPAIR      KNEE ARTHROSCOPY Right     KNEE ARTHROSCOPY Right     PERONEAL NERVE DECOMPRESSION Right     [place] ESOPHAGOGASTRODUODENOSCOPY TRANSORAL DIAGNOSTIC N/A 2/27/2017    Procedure: ESOPHAGOGASTRODUODENOSCOPY (EGD); Surgeon: Nils Moritz, MD;  [facility];   Service: Gastroenterology    [place] INSERT SPINE INFUSN [address] [place] [address] N/A 1/19/2017    Procedure: REMOVAL / EXCHANGE INTRATHECAL PAIN PUMP;  Surgeon: Kristine Holguin MD;  [facility];  Service: Orthopedics    [place] INSERT SPINE INFUSN [address] [place] [address] N/A 5/16/2016    Procedure: REPLACEMENT AND PROGRAM PUMP ;  Surgeon: Kristine Holguin MD;  [facility];  Service: Orthopedics     Social History   History   Alcohol Use No     History   Drug Use No     History   Smoking Status    Never Smoker   Smokeless Tobacco    Never Used     Family History   Problem Relation Age of Onset    Family history unknown: Yes       Meds/Allergies       Current Outpatient Prescriptions:     albuterol (ACCUNEB) 1 25 MG/3ML nebulizer solution    albuterol (PROVENTIL HFA,VENTOLIN HFA) 90 mcg/act inhaler    amLODIPine (NORVASC) 5 mg tablet    aspirin 81 MG tablet    atorvastatin (LIPITOR) 40 mg tablet    baclofen 10 mg tablet    cholecalciferol (VITAMIN D3) 1,000 units tablet    citalopram (CeleXA) 40 mg tablet    clopidogrel (PLAVIX) 75 mg tablet    dicyclomine (BENTYL) 20 mg tablet    Empagliflozin (JARDIANCE) 25 MG TABS    fluticasone-salmeterol (ADVAIR) 250-50 mcg/dose    gabapentin (NEURONTIN) 800 mg tablet    insulin aspart (NOVOLOG FLEXPEN) 100 Units/mL SOPN    Insulin Degludec (TRESIBA FLEXTOUCH) 200 UNIT/ML SOPN    isosorbide mononitrate (IMDUR) 30 mg 24 hr tablet    lubiprostone (AMITIZA) 24 mcg capsule    memantine (NAMENDA) 10 mg tablet    morphine (MS CONTIN) 100 mg 12 hr tablet    nebivolol (BYSTOLIC) 10 mg tablet    nitroglycerin (NITROSTAT) 0 4 mg SL tablet    omeprazole (PriLOSEC) 40 MG capsule    oxyCODONE (ROXICODONE) 30 MG immediate release tablet    polyethylene glycol (MIRALAX) 17 g packet    potassium chloride (K-DUR,KLOR-CON) 10 mEq tablet    ranolazine (RANEXA) 1000 MG SR tablet    tamsulosin (FLOMAX) 0 4 mg    TiZANidine (ZANAFLEX) 2 MG capsule    topiramate (TOPAMAX) 100 mg tablet    torsemide (DEMADEX) 100 mg tablet    traZODone (DESYREL) 100 mg tablet    zolpidem (AMBIEN) 10 mg tablet    No Known Allergies        Objective     Blood pressure 122/82, pulse 61, temperature (!) 96 7 °F (35 9 °C), temperature source Tympanic, height 6' 2" (1 88 m), weight (!) 170 kg (375 lb)  PHYSICAL EXAM:      General Appearance:   Alert, cooperative, no distress   HEENT:   Normocephalic, atraumatic, anicteric      Neck:  Supple, symmetrical, trachea midline   Lungs:   Clear to auscultation bilaterally; no rales, rhonchi or wheezing; respirations unlabored    Heart[de-identified]   Regular rate and rhythm; no murmur, rub, or gallop  Abdomen:   Soft, obese with LUQ tenderness, non-distended; normal bowel sounds; no masses, no organomegaly    Genitalia:   Deferred    Rectal:   Deferred    Extremities:  No cyanosis, clubbing or edema    Pulses:  2+ and symmetric all extremities    Skin:  No jaundice, rashes, or lesions    Lymph nodes:  No palpable cervical lymphadenopathy        Lab Results:   No visits with results within 1 Day(s) from this visit     Latest known visit with results is:   Appointment on 11/22/2017   Component Date Value    Sodium 11/22/2017 141     Potassium 11/22/2017 3 2*    Chloride 11/22/2017 101     CO2 11/22/2017 31     Anion Gap 11/22/2017 9     BUN 11/22/2017 28*    Creatinine 11/22/2017 1 72*    Glucose, Fasting 11/22/2017 169*    Calcium 11/22/2017 8 0*    eGFR 11/22/2017 44

## 2018-02-05 ENCOUNTER — PROCEDURE VISIT (OUTPATIENT)
Dept: NEUROLOGY | Facility: CLINIC | Age: 58
End: 2018-02-05
Payer: MEDICARE

## 2018-02-05 VITALS — HEART RATE: 63 BPM | SYSTOLIC BLOOD PRESSURE: 134 MMHG | DIASTOLIC BLOOD PRESSURE: 61 MMHG | TEMPERATURE: 97.7 F

## 2018-02-05 DIAGNOSIS — IMO0002 CHRONIC MIGRAINE: Primary | ICD-10-CM

## 2018-02-05 DIAGNOSIS — G43.709 CHRONIC MIGRAINE WITHOUT AURA WITHOUT STATUS MIGRAINOSUS, NOT INTRACTABLE: ICD-10-CM

## 2018-02-05 PROCEDURE — 64615 CHEMODENERV MUSC MIGRAINE: CPT | Performed by: PSYCHIATRY & NEUROLOGY

## 2018-02-05 NOTE — PROGRESS NOTES
Chemodenervation  Date/Time: 2/5/2018 1:00 PM  Performed by: Bianka Nunes  Authorized by: Damir Hernandez details:     Prepped With: Alcohol    Anesthesia (see MAR for exact dosages): Anesthesia method:  None  Procedure details:     Position:  Upright  Botox:     Botox Type:  Type A    Brand:  Botox    Final Concentration per CC:  200 units    Needle Gauge:  30 G 2 5 inch    Medication Administration:  100 Units onabotulinumtoxin A 100 units  Procedures:     Botox Procedures: chronic headache      Indications: migraines    Injection Location:     Head / Face:  L superior cervical paraspinal, R superior cervical paraspinal, L , R , L frontalis, R frontalis, L medial occipitalis, R medial occipitalis, procerus, R temporalis, L temporalis, R superior trapezius and L superior trapezius    L  injection amount:  5 unit(s)    R  injection amount:  5 unit(s)    L lateral frontalis:  5 unit(s)    R lateral frontalis:  5 unit(s)    L medial frontalis:  5 unit(s)    R medial frontalis:  5 unit(s)    L temporalis injection amount:  20 unit(s)    R temporalis injection amount:  20 unit(s)    Procerus injection amount:  5 unit(s)    L medial occipitalis injection amount:  15 unit(s)    R medial occipitalis injection amount:  15 unit(s)    L superior cervical paraspinal injection amount:  10 unit(s)    R superior cervical paraspinal injection amount:  10 unit(s)    L superior trapezius injection amount:  15 unit(s)    R superior trapezius injection amount:  15 unit(s)  Total Units:     Total units used:  200, all medically necessary    Total units discarded:  0  Post-procedure details:     Chemodenervation:  Chronic migraine    Patient tolerance of procedure: Tolerated well, no immediate complications  Comments:      45 units in his scalp, which was medically necessary

## 2018-02-12 ENCOUNTER — TELEPHONE (OUTPATIENT)
Dept: CARDIOLOGY CLINIC | Facility: CLINIC | Age: 58
End: 2018-02-12

## 2018-02-12 NOTE — TELEPHONE ENCOUNTER
Received fax request from [facility] requesting refill for Bystolic, [id number]    Phone call to [facility], patient being seen by Rolando Cerda confirmed received refill from Dr Domi Singleton

## 2018-03-07 LAB
HBA1C MFR BLD HPLC: 7.8 %
MICROALBUMIN/CREATININE RATIO (HISTORICAL): 103 MG/G CREATININE

## 2018-03-08 ENCOUNTER — TELEPHONE (OUTPATIENT)
Dept: ENDOCRINOLOGY | Facility: CLINIC | Age: 58
End: 2018-03-08

## 2018-03-08 NOTE — TELEPHONE ENCOUNTER
----- Message from Cosmo Montoya MD sent at 3/7/2018 11:34 PM EST -----  Please call the patient regarding his abnormal result   a1c high 7 8 - send in f s log in 2 weeks

## 2018-03-21 DIAGNOSIS — E55.9 VITAMIN D DEFICIENCY: Primary | ICD-10-CM

## 2018-03-21 RX ORDER — MAG HYDROX/ALUMINUM HYD/SIMETH 400-400-40
5000 SUSPENSION, ORAL (FINAL DOSE FORM) ORAL DAILY
Qty: 30 CAPSULE | Refills: 6 | Status: SHIPPED | OUTPATIENT
Start: 2018-03-21 | End: 2018-10-09 | Stop reason: SDUPTHER

## 2018-04-24 ENCOUNTER — OFFICE VISIT (OUTPATIENT)
Dept: ENDOCRINOLOGY | Facility: CLINIC | Age: 58
End: 2018-04-24
Payer: MEDICARE

## 2018-04-24 VITALS
HEART RATE: 70 BPM | DIASTOLIC BLOOD PRESSURE: 78 MMHG | BODY MASS INDEX: 41.75 KG/M2 | HEIGHT: 73 IN | WEIGHT: 315 LBS | SYSTOLIC BLOOD PRESSURE: 120 MMHG

## 2018-04-24 DIAGNOSIS — I10 HYPERTENSION, UNSPECIFIED TYPE: ICD-10-CM

## 2018-04-24 DIAGNOSIS — Z79.4 TYPE 2 DIABETES MELLITUS WITH STAGE 3 CHRONIC KIDNEY DISEASE, WITH LONG-TERM CURRENT USE OF INSULIN (HCC): Primary | ICD-10-CM

## 2018-04-24 DIAGNOSIS — E78.5 HYPERLIPIDEMIA, UNSPECIFIED HYPERLIPIDEMIA TYPE: ICD-10-CM

## 2018-04-24 DIAGNOSIS — E11.22 TYPE 2 DIABETES MELLITUS WITH STAGE 3 CHRONIC KIDNEY DISEASE, WITH LONG-TERM CURRENT USE OF INSULIN (HCC): Primary | ICD-10-CM

## 2018-04-24 DIAGNOSIS — N18.30 TYPE 2 DIABETES MELLITUS WITH STAGE 3 CHRONIC KIDNEY DISEASE, WITH LONG-TERM CURRENT USE OF INSULIN (HCC): Primary | ICD-10-CM

## 2018-04-24 LAB — SL AMB POCT HEMOGLOBIN AIC: 6

## 2018-04-24 PROCEDURE — 83036 HEMOGLOBIN GLYCOSYLATED A1C: CPT | Performed by: PHYSICIAN ASSISTANT

## 2018-04-24 PROCEDURE — 99214 OFFICE O/P EST MOD 30 MIN: CPT | Performed by: PHYSICIAN ASSISTANT

## 2018-04-24 NOTE — PATIENT INSTRUCTIONS
**if eating a protein shake instead of a usual meal, take HALF dose of novolog  **if skipping a meal, skip novolog  Be consistent with carb count at each meal to prevent fluctuating blood sugars  Send glucose log in two weeks  Hypoglycemia instructions   Elfego Venegas  4/24/2018  [id number]    Low Blood Sugar    Steps to treat low blood sugar  1  Test blood sugar if you have symptoms of low blood sugar:   Low Blood Sugar Symptoms:  o Sweaty  o Dizzy  o Rapid heartbeat  o Shaky    o Bad mood  o Hungry      2  Treat blood sugar less than 70 with 15 grams of fast-acting carbohydrate:   Examples of 15 grams Fast-Acting Carbohydrate:  o 4 oz juice  o 4 oz regular soda  o 3-4 glucose tablets (chew)  o 3-4 hard candies (chew)              3    Wait 15 minutes and test your blood sugar again           4   If blood sugar is less than 100, repeat steps 2-3       5  When your blood sugar is 100 or more, eat a snack if it will be longer than one hour until your next meal  The snack should be 15 grams of carbohydrate and a protein:   Examples of snacks:  o ½ sandwich  o 6 crackers with cheese  o Piece of fruit with cheese or peanut butter  o 6 crackers with peanut butter

## 2018-04-25 NOTE — ASSESSMENT & PLAN NOTE
Improved/at goal with A1C of 6 0 today at visit  Some hypoglycemia related to taking full dose of novolog with protein shakes  Advised him to take half dose of Novolog when using protein shakes  If skipping meal, should skip Novolog  Reviewed symptoms/treatment of hypoglycemia  Due to being on intensive insulin therapy,  he at high risk of severe hypoglycemia  Severe hypoglycemia can result in falls, injury, coma, seizure, or death  Discussed importance of following consistent carbohydrate diet to minimize glucose fluctuations  Bariatric surgery will be postponed until fall  If he has significant weight loss or starts low carb/liquid diet before next visit he should let us know

## 2018-05-01 ENCOUNTER — TELEPHONE (OUTPATIENT)
Dept: NEUROLOGY | Facility: CLINIC | Age: 58
End: 2018-05-01

## 2018-05-08 ENCOUNTER — PROCEDURE VISIT (OUTPATIENT)
Dept: NEUROLOGY | Facility: CLINIC | Age: 58
End: 2018-05-08
Payer: MEDICARE

## 2018-05-08 VITALS — TEMPERATURE: 98.5 F

## 2018-05-08 DIAGNOSIS — G43.709 CHRONIC MIGRAINE WITHOUT AURA WITHOUT STATUS MIGRAINOSUS, NOT INTRACTABLE: Primary | ICD-10-CM

## 2018-05-08 PROCEDURE — 64615 CHEMODENERV MUSC MIGRAINE: CPT | Performed by: PSYCHIATRY & NEUROLOGY

## 2018-05-08 NOTE — PROGRESS NOTES
Chemodenervation  Date/Time: 5/8/2018 11:52 AM  Performed by: Jaimie Fontanez  Authorized by: Eb Duckworth details:     Prepped With: Alcohol    Anesthesia (see MAR for exact dosages): Anesthesia method:  None  Procedure details:     Position:  Upright  Botox:     Botox Type:  Type A    Brand:  Botox    mL's of Botulinum Toxin:  200    Final Concentration per CC:  200 units    Needle Gauge:  30 G 2 5 inch  Procedures:     Botox Procedures: chronic headache      Indications: migraines    Injection Location:     Head / Face:  L superior cervical paraspinal, R superior cervical paraspinal, L , R , L frontalis, R frontalis, L medial occipitalis, R medial occipitalis, procerus, R temporalis, L temporalis, R superior trapezius and L superior trapezius    L  injection amount:  5 unit(s)    R  injection amount:  5 unit(s)    L lateral frontalis:  5 unit(s)    R lateral frontalis:  5 unit(s)    L medial frontalis:  5 unit(s)    R medial frontalis:  5 unit(s)    L temporalis injection amount:  20 unit(s)    R temporalis injection amount:  20 unit(s)    Procerus injection amount:  5 unit(s)    L medial occipitalis injection amount:  15 unit(s)    R medial occipitalis injection amount:  15 unit(s)    L superior cervical paraspinal injection amount:  10 unit(s)    R superior cervical paraspinal injection amount:  10 unit(s)    L superior trapezius injection amount:  15 unit(s)    R superior trapezius injection amount:  15 unit(s)  Total Units:     Total units used:  200    Total units discarded:  0  Post-procedure details:     Chemodenervation:  Chronic migraine    Patient tolerance of procedure: Tolerated well, no immediate complications  Comments:      35 units in the occipital region and 5 units in the Obicularis oculi region bilaterally, which was medically necessary

## 2018-05-09 ENCOUNTER — OFFICE VISIT (OUTPATIENT)
Dept: GASTROENTEROLOGY | Facility: CLINIC | Age: 58
End: 2018-05-09
Payer: MEDICARE

## 2018-05-09 VITALS
HEART RATE: 68 BPM | WEIGHT: 315 LBS | TEMPERATURE: 99.2 F | HEIGHT: 73 IN | DIASTOLIC BLOOD PRESSURE: 56 MMHG | BODY MASS INDEX: 41.75 KG/M2 | SYSTOLIC BLOOD PRESSURE: 94 MMHG

## 2018-05-09 DIAGNOSIS — R13.19 ESOPHAGEAL DYSPHAGIA: ICD-10-CM

## 2018-05-09 DIAGNOSIS — R53.83 LETHARGY: ICD-10-CM

## 2018-05-09 DIAGNOSIS — K59.03 DRUG-INDUCED CONSTIPATION: ICD-10-CM

## 2018-05-09 DIAGNOSIS — K21.9 GASTROESOPHAGEAL REFLUX DISEASE WITHOUT ESOPHAGITIS: Primary | ICD-10-CM

## 2018-05-09 DIAGNOSIS — I95.9 HYPOTENSION, UNSPECIFIED HYPOTENSION TYPE: ICD-10-CM

## 2018-05-09 PROCEDURE — 99214 OFFICE O/P EST MOD 30 MIN: CPT | Performed by: PHYSICIAN ASSISTANT

## 2018-05-09 NOTE — PROGRESS NOTES
[facility] - Outpatient Follow-up Note  Robi Castaneda 56 y o  male MRN: [id number]  Encounter: [id number]          ASSESSMENT AND PLAN:      1  Gastroesophageal reflux disease without esophagitis   -The dose of his omeprazole was decreased to 40 mg daily at his last visit  He feels his reflux is overall well controlled    -We reviewed the lifestyle and dietary changes that will help his symptoms  He has been attempting to lose weight in preparation for bariatric surgery    -He is planning to undergo bariatric surgery in the late fall, this was put on hold from this spring of he needs to remain on Plavix due to his cardiac stent placement in October of last year        2  Esophageal dysphagia   -He had an esophageal manometry test in the past which showed esophageal spasming/ dysmotility    -His dysphagia symptoms have worsened, his amlodipine was increased to 5mg at his last visit but he reports difficulty swallowing rice and pasta  -Recommend thorough chewing and small bites  He would like to have his esophagus dilated again however he does need to remain on his Plavix as per his cardiologist   He states that he will discuss this with his cardiologist and let us know if he can stop the medication prior to an upper endoscopy  -Follow up in 3 months     3  Drug-induced constipation   -His constipation has responded well to the combination of Amitiza twice a day and occasional MiraLax  Recommend continugin this regimen  4    Fatigue/ lethargy/hypotension   - he is falling asleep while talking to me in the office and although he does awaken easily, he appears somewhat short of breath  -  He reports that he has been fatigued for the past several weeks  He denies fevers, chills, cough or recent illness     -  He is hypotensive in the office with blood pressure of 94/56 and temperature measured at 99 2    -  This is concerning for underlying infection and I recommended the present to the ED for prompt evaluation, we discussed that due to his diabetes he may not present with normal symptoms of infection  He declined and stated that if his pain management physician, who he is seening later today, also recommended that he go to the ED he would go   ______________________________________________________________________    SUBJECTIVE:      56-year-old male with past medical history of type 2 diabetes complicated by CKD and neuropathy , CVA, CAD status post stent placement, CHF, ARLEEN presents to the office for follow-up of GERD,dysphagia and constipation  He reports regurgitation of rice of rice and pasta  He states this is new in the past month, that his symptoms were under good control previously  He believes he needs esophageal dilation as this helped with his symptoms in the past     He reports that his constipation is overall under good control  In the office, he is falling asleep while talking to me and although he does awaken easily he appears somewhat short of breath  He reports that his bariatric surgery is on hold now until the fall due to an NSTEMI with PCI  Placement 10/17 and the need to remain on Plavix until 10/2018  REVIEW OF SYSTEMS IS OTHERWISE NEGATIVE        Historical Information   Past Medical History:   Diagnosis Date    Acute on chronic diastolic congestive heart failure (HCC)     Altered gait     Alzheimer disease     per patients,,early onset     Angina pectoris ([address] )     Anxiety     Arthritis     Brain aneurysm     Cardiac disease     Chest pain 1/13/2016    Chronic pain     back/ right groin and rle- has morphine pump    Constipation     COPD (chronic obstructive pulmonary disease) (HCC)     Coronary artery disease     CPAP (continuous positive airway pressure) dependence     Dependent on walker for ambulation     w/c for long distance    Depression     Diabetes mellitus (HCC)     insulin dependent    Dizziness     occ    Enlarged prostate     GERD (gastroesophageal reflux disease)     Heart failure (HCC)     Hiatal hernia     Hypercholesterolemia     Hypertension     [facility]) [facility]     Migraine     Neuropathy     Oxygen dependent     Q HS  2LPM with CPAP and prn during day 2-3 LPM     Shortness of breath     Sleep apnea     Stented coronary artery     Stroke ([facility] [facility]     vision loss    Urinary frequency     Wears dentures     Wears glasses      Past Surgical History:   Procedure Laterality Date    BACK SURGERY      BRAIN SURGERY      CARDIAC CATHETERIZATION      with stents    CEREBRAL ANEURYSM REPAIR      with coils    COLONOSCOPY      ESOPHAGOGASTRODUODENOSCOPY N/A 7/1/2016    Procedure: ESOPHAGOGASTRODUODENOSCOPY (EGD); Surgeon: Melanie Owens MD;  [facility]; Service:     HERNIA REPAIR      HIATAL HERNIA REPAIR      KNEE ARTHROSCOPY Right     KNEE ARTHROSCOPY Right     PERONEAL NERVE DECOMPRESSION Right     [place] ESOPHAGOGASTRODUODENOSCOPY TRANSORAL DIAGNOSTIC N/A 2/27/2017    Procedure: ESOPHAGOGASTRODUODENOSCOPY (EGD); Surgeon: Melanie Owens MD;  [facility];   Service: Gastroenterology    [place] INSERT SPINE INFUSN [facility] N/A 1/19/2017    Procedure: REMOVAL / Kevin Deis;  Surgeon: Juanita Banda MD;  [facility];  Service: Orthopedics    [place] INSERT SPINE INFUSN [facility] N/A 5/16/2016    Procedure: REPLACEMENT AND PROGRAM PUMP ;  Surgeon: Juanita Banda MD;  [facility];  Service: Orthopedics     Social History   History   Alcohol Use No     History   Drug Use No     History   Smoking Status    Never Smoker   Smokeless Tobacco    Never Used     Family History   Problem Relation Age of Onset    Diabetes unspecified Mother     Diabetes unspecified Brother     Diabetes unspecified Paternal Uncle     Diabetes unspecified Maternal Grandmother     Diabetes unspecified Paternal Grandmother     Diabetes unspecified Brother Meds/Allergies       Current Outpatient Prescriptions:     albuterol (ACCUNEB) 1 25 MG/3ML nebulizer solution    albuterol (PROVENTIL HFA,VENTOLIN HFA) 90 mcg/act inhaler    amLODIPine (NORVASC) 5 mg tablet    aspirin 81 MG tablet    atorvastatin (LIPITOR) 40 mg tablet    baclofen 10 mg tablet    Cholecalciferol (VITAMIN D3) 5000 units CAPS    citalopram (CeleXA) 40 mg tablet    clopidogrel (PLAVIX) 75 mg tablet    dicyclomine (BENTYL) 20 mg tablet    fluticasone-salmeterol (ADVAIR) 250-50 mcg/dose    gabapentin (NEURONTIN) 800 mg tablet    insulin aspart (NOVOLOG FLEXPEN) 100 Units/mL SOPN    Insulin Degludec (TRESIBA FLEXTOUCH) 200 UNIT/ML SOPN    isosorbide mononitrate (IMDUR) 30 mg 24 hr tablet    lubiprostone (AMITIZA) 24 mcg capsule    memantine (NAMENDA) 10 mg tablet    morphine (MS CONTIN) 100 mg 12 hr tablet    nebivolol (BYSTOLIC) 10 mg tablet    nitroglycerin (NITROSTAT) 0 4 mg SL tablet    omeprazole (PriLOSEC) 40 MG capsule    oxyCODONE (ROXICODONE) 30 MG immediate release tablet    polyethylene glycol (MIRALAX) 17 g packet    potassium chloride (K-DUR,KLOR-CON) 10 mEq tablet    ranolazine (RANEXA) 1000 MG SR tablet    tamsulosin (FLOMAX) 0 4 mg    TiZANidine (ZANAFLEX) 2 MG capsule    topiramate (TOPAMAX) 100 mg tablet    torsemide (DEMADEX) 100 mg tablet    traZODone (DESYREL) 100 mg tablet    zolpidem (AMBIEN) 10 mg tablet    No Known Allergies        Objective     There were no vitals taken for this visit  There is no height or weight on file to calculate BMI  PHYSICAL EXAM:      General Appearance:   Lethargic, cooperative, no distress, morbidly obese, ambulates with rolling walker   HEENT:   Normocephalic, atraumatic, anicteric      Neck:  Supple, symmetrical, trachea midline   Lungs:   Clear to auscultation bilaterally; no rales, rhonchi or wheezing; respirations unlabored    Heart[de-identified]   Regular rate and rhythm; no murmur, rub, or gallop     Abdomen:   Soft, mild epigastric tenderness, non-distended; normal bowel sounds; no masses, no organomegaly    Genitalia:   Deferred    Rectal:   Deferred    Extremities:  No cyanosis, clubbing or edema    Pulses:  Deferred   Skin:  No jaundice, rashes, or lesions    Lymph nodes:  No palpable cervical lymphadenopathy        Lab Results:   No visits with results within 1 Day(s) from this visit  Latest known visit with results is:   Office Visit on 04/24/2018   Component Date Value     HGB A1C 04/24/2018 6 0          Radiology Results:   No results found

## 2018-07-02 DIAGNOSIS — Z79.4 TYPE 2 DIABETES MELLITUS WITH STAGE 3 CHRONIC KIDNEY DISEASE, WITH LONG-TERM CURRENT USE OF INSULIN (HCC): ICD-10-CM

## 2018-07-02 DIAGNOSIS — N18.30 TYPE 2 DIABETES MELLITUS WITH STAGE 3 CHRONIC KIDNEY DISEASE, WITH LONG-TERM CURRENT USE OF INSULIN (HCC): ICD-10-CM

## 2018-07-02 DIAGNOSIS — E11.22 TYPE 2 DIABETES MELLITUS WITH STAGE 3 CHRONIC KIDNEY DISEASE, WITH LONG-TERM CURRENT USE OF INSULIN (HCC): ICD-10-CM

## 2018-07-03 ENCOUNTER — TELEPHONE (OUTPATIENT)
Dept: NEUROLOGY | Facility: CLINIC | Age: 58
End: 2018-07-03

## 2018-07-03 NOTE — TELEPHONE ENCOUNTER
Spoke with patient regarding needing to r/s 8/8/18 apt with Dr Salmeron Monday per David Beltran  Called and offered apt with Alexandra De or other AP and patient requested to only see Dr Salmeron Monday  I scheduled patient on 9/12/18 at 1 am in [facility]  Patient agreeable to wait  New apt card being mailed to pts home address (confirmed with pt)

## 2018-08-01 ENCOUNTER — OFFICE VISIT (OUTPATIENT)
Dept: ENDOCRINOLOGY | Facility: CLINIC | Age: 58
End: 2018-08-01
Payer: MEDICARE

## 2018-08-01 VITALS
HEART RATE: 67 BPM | BODY MASS INDEX: 41.75 KG/M2 | DIASTOLIC BLOOD PRESSURE: 80 MMHG | WEIGHT: 315 LBS | SYSTOLIC BLOOD PRESSURE: 128 MMHG | HEIGHT: 73 IN

## 2018-08-01 DIAGNOSIS — E11.22 TYPE 2 DIABETES MELLITUS WITH STAGE 3 CHRONIC KIDNEY DISEASE, WITH LONG-TERM CURRENT USE OF INSULIN (HCC): ICD-10-CM

## 2018-08-01 DIAGNOSIS — N18.30 STAGE 3 CHRONIC KIDNEY DISEASE (HCC): ICD-10-CM

## 2018-08-01 DIAGNOSIS — N18.30 TYPE 2 DIABETES MELLITUS WITH STAGE 3 CHRONIC KIDNEY DISEASE, WITH LONG-TERM CURRENT USE OF INSULIN (HCC): ICD-10-CM

## 2018-08-01 DIAGNOSIS — Z79.4 TYPE 2 DIABETES MELLITUS WITH HYPERGLYCEMIA, WITH LONG-TERM CURRENT USE OF INSULIN (HCC): Primary | ICD-10-CM

## 2018-08-01 DIAGNOSIS — E66.01 MORBID OBESITY (HCC): ICD-10-CM

## 2018-08-01 DIAGNOSIS — E11.65 TYPE 2 DIABETES MELLITUS WITH HYPERGLYCEMIA, WITH LONG-TERM CURRENT USE OF INSULIN (HCC): Primary | ICD-10-CM

## 2018-08-01 DIAGNOSIS — Z79.4 TYPE 2 DIABETES MELLITUS WITH STAGE 3 CHRONIC KIDNEY DISEASE, WITH LONG-TERM CURRENT USE OF INSULIN (HCC): ICD-10-CM

## 2018-08-01 DIAGNOSIS — E55.9 VITAMIN D DEFICIENCY: ICD-10-CM

## 2018-08-01 PROCEDURE — 99214 OFFICE O/P EST MOD 30 MIN: CPT | Performed by: INTERNAL MEDICINE

## 2018-08-01 NOTE — PROGRESS NOTES
Estrellita Harmon 56 y o  male MRN: [id number]    Encounter: [id number]      Assessment/Plan     Problem List Items Addressed This Visit     Morbid obesity ([facility] [address] )    Type 2 diabetes mellitus with hyperglycemia, with long-term current use of insulin ([facility] [address] ) - Primary     Lab Results   Component Value Date    HGBA1C 6 0 04/24/2018       No results for input(s): POCGLU in the last 72 hours  Blood Sugar Average: Last 72 hrs:  sugars high for the past few months as he has not been watching diet - for now increase tresiba to 130 units and continue humalog at current dose - when he restarts liquid diet he will cut back humalog to 1/2 dose   Relevant Medications    insulin aspart (NOVOLOG FLEXPEN) 100 Units/mL injection pen    Other Relevant Orders    HEMOGLOBIN A1C W/ EAG ESTIMATION Lab Collect    Stage 3 chronic kidney disease     Has upcoming repeat BMP          Vitamin D deficiency     Continue supplementations          Relevant Orders    Vitamin D 25 hydroxy Lab Collect        CC: Diabetes    History of Present Illness     HPI:  57 y/o gentleman with history of type 2 DM on long term insulin here for follow up  Gastric bypass surgery postponed to late October - he has not been watching diet and has gained about 30 lbs since last visit    current regimen -tresiba 120 units bedtime   humalog 34-30-34    Checking sugars 3/day sugars can range between  150-300s   No hypoglycemia     + polyuria , polydipsia , + blurry vision   + numbness and tingling in feet   Review of Systems   Constitutional: Positive for fatigue  Negative for unexpected weight change  Eyes: Negative for visual disturbance  Respiratory: Negative for cough and shortness of breath  Cardiovascular: Positive for leg swelling  Negative for palpitations  Gastrointestinal: Positive for constipation  Negative for nausea and vomiting  Musculoskeletal: Positive for arthralgias and myalgias     Skin: Negative for color change, pallor and rash  Psychiatric/Behavioral: Positive for dysphoric mood and sleep disturbance  All other systems reviewed and are negative  Historical Information   Past Medical History:   Diagnosis Date    Acute on chronic diastolic congestive heart failure (HCC)     Altered gait     Alzheimer disease     per patients,,early onset     [facility]ina [facility]is [facility] [address] [facility]     Anxiety     Arthritis     Brain aneurysm     Cardiac disease     Chest pain 1/13/2016    Chronic pain     back/ right groin and rle- has morphine pump    Constipation     [facility]) [facility]     Coronary artery disease     CPAP (continuous positive airway pressure) dependence     Dependent on walker for ambulation     w/c for long distance    Depression     Diabetes mellitus (HCC)     insulin dependent    Dizziness     occ    Enlarged prostate     GERD (gastroesophageal reflux disease)     Heart failure ([facility])     Hiatal hernia     Hypercholesterolemia     Hypertension     MI [facility]my[facility] infarction) [facility] [facility]     Migraine     Neuropathy     Oxygen dependent     Q HS  2LPM with CPAP and prn during day 2-3 LPM     Shortness of breath     Sleep apnea     Stented coronary artery     St[facility]     vision loss    Urinary frequency     Wears dentures     Wears glasses      Past Surgical History:   Procedure Laterality Date    BACK SURGERY      BRAIN SURGERY      CARDIAC CATHETERIZATION      with stents    CEREBRAL ANEURYSM REPAIR      with coils    COLONOSCOPY      ESOPHAGOGASTRODUODENOSCOPY N/A 7/1/2016    Procedure: ESOPHAGOGASTRODUODENOSCOPY (EGD); Surgeon: Merlin Lulas, MD;  [facility]; Service:     HERNIA REPAIR      HIATAL HERNIA REPAIR      KNEE ARTHROSCOPY Right     KNEE ARTHROSCOPY Right     PERONEAL NERVE DECOMPRESSION Right     [place] ESOPHAGOGASTRODUODENOSCOPY TRANSORAL DIAGNOSTIC N/A 2/27/2017    Procedure: ESOPHAGOGASTRODUODENOSCOPY (EGD);   Surgeon: Merlin Lulas, MD;  [facility]; Service: Gastroenterology    [place] INSERT SPINE [facility]FUSN [facility] N/A 1/19/2017    Procedure: REMOVAL / Cloyce Decree;  Surgeon: Leonel Mccoy MD;  [facility];  Service: Orthopedics    [place] INSERT SPINE [facility]FUSN [facility] N/A 5/16/2016    Procedure: REPLACEMENT AND PROGRAM PUMP ;  Surgeon: Leonel Mccoy MD;  [facility];  Service: Orthopedics     Social History   History   Alcohol Use No     History   Drug Use No     History   Smoking Status    Never Smoker   Smokeless Tobacco    Never Used     Family History:   Family History   Problem Relation Age of Onset    Diabetes unspecified Mother     Diabetes unspecified Brother     Diabetes unspecified Paternal Uncle     Diabetes unspecified Maternal Grandmother     Diabetes unspecified Paternal Grandmother     Diabetes unspecified Brother        Meds/Allergies   Current Outpatient Prescriptions   Medication Sig Dispense Refill    albuterol (ACCUNEB) 1 25 MG/3ML nebulizer solution Take 1 ampule by nebulization every 6 (six) hours as needed for wheezing      albuterol (PROVENTIL HFA,VENTOLIN HFA) 90 mcg/act inhaler Inhale 2 puffs every 6 (six) hours as needed for wheezing      amLODIPine (NORVASC) 5 mg tablet Take 1 tablet (5 mg total) by mouth daily 30 tablet 5    aspirin 81 MG tablet Take 81 mg by mouth daily   atorvastatin (LIPITOR) 40 mg tablet Take 40 mg by mouth daily   baclofen 10 mg tablet Take 10 mg by mouth 3 (three) times a day      Cholecalciferol (VITAMIN D3) 5000 units CAPS Take 1 capsule (5,000 Units total) by mouth daily 30 capsule 6    citalopram (CeleXA) 40 mg tablet Take 40 mg by mouth daily   clopidogrel (PLAVIX) 75 mg tablet Take 75 mg by mouth daily   dicyclomine (BENTYL) 20 mg tablet Take 10 mg by mouth every 6 (six) hours as needed      fluticasone-salmeterol (ADVAIR) 250-50 mcg/dose Inhale 1 puff 2 (two) times a day        gabapentin (NEURONTIN) 800 mg tablet Take 800 mg by mouth 2 (two) times a day      insulin aspart (NOVOLOG FLEXPEN) 100 Units/mL injection pen 34 before breakfast, 30 before lunch, 34 before dinner 30 pen 1    insulin degludec (TRESIBA FLEXTOUCH) 200 units/mL CONCENTRATED U-200 injection pen Inject 120 Units under the skin daily at bedtime 6 pen 2    isosorbide mononitrate (IMDUR) 30 mg 24 hr tablet Take 1 tablet by mouth daily 30 tablet 0    lubiprostone (AMITIZA) 24 mcg capsule Take 24 mcg by mouth 2 (two) times a day with meals   memantine (NAMENDA) 10 mg tablet Take 10 mg by mouth daily      morphine (MS CONTIN) 100 mg 12 hr tablet Take 100 mg by mouth 2 (two) times a day        nebivolol (BYSTOLIC) 10 mg tablet Take 10 mg by mouth daily   nitroglycerin (NITROSTAT) 0 4 mg SL tablet Place 0 4 mg under the tongue every 5 (five) minutes as needed for chest pain (pt has not  used recently)   omeprazole (PriLOSEC) 40 MG capsule Take 1 capsule (40 mg total) by mouth daily 90 capsule 3    oxyCODONE (ROXICODONE) 30 MG immediate release tablet Take 30 mg by mouth every 4 (four) hours as needed for moderate pain        polyethylene glycol (MIRALAX) 17 g packet Take 17 g by mouth daily   potassium chloride (K-DUR,KLOR-CON) 10 mEq tablet Take 2 tablets by mouth 3 (three) times a day 90 tablet 0    ranolazine (RANEXA) 1000 MG SR tablet Take 1,000 mg by mouth 2 (two) times a day   tamsulosin (FLOMAX) 0 4 mg Take 0 4 mg by mouth daily with dinner   TiZANidine (ZANAFLEX) 2 MG capsule Take 2 mg by mouth 3 (three) times a day   topiramate (TOPAMAX) 100 mg tablet Take 100 mg by mouth 2 (two) times a day   torsemide (DEMADEX) 100 mg tablet Take 0 5 tablets by mouth 2 (two) times a day 60 tablet 0    traZODone (DESYREL) 100 mg tablet Take 100 mg by mouth daily at bedtime          zolpidem (AMBIEN) 10 mg tablet Take 10 mg by mouth daily at bedtime as needed for sleep       No current facility-administered medications for this visit  No Known Allergies    Objective   Vitals: Blood pressure 128/80, pulse 67, height 6' 1 1" (1 857 m), weight (!) 174 kg (383 lb 1 6 oz)  Physical Exam   Constitutional: He is oriented to person, place, and time  He appears well-developed and well-nourished  No distress  HENT:   Head: Normocephalic and atraumatic  Mouth/Throat: No oropharyngeal exudate  Eyes: Conjunctivae and EOM are normal  No scleral icterus  Neck: Normal range of motion  Neck supple  Cardiovascular: Normal rate, regular rhythm and normal heart sounds  No murmur heard  Pulmonary/Chest: Effort normal and breath sounds normal  No respiratory distress  He has no wheezes  He has no rales  Abdominal: Soft  Bowel sounds are normal  He exhibits no distension  There is no tenderness  There is no rebound  Musculoskeletal: Normal range of motion  He exhibits no deformity  Lymphadenopathy:     He has no cervical adenopathy  Neurological: He is alert and oriented to person, place, and time  Skin: Skin is warm and dry  No rash noted  No erythema  No pallor  Psychiatric: He has a normal mood and affect  His behavior is normal        The history was obtained from the review of the chart, patient      Lab Results:   Lab Results   Component Value Date/Time    Hemoglobin A1C 6 9 (H) 10/11/2017 04:36 PM     HGB A1C 6 0 04/24/2018 02:54 PM    WBC 14 45 (H) 11/04/2017 05:23 AM    WBC 11 44 (H) 11/03/2017 07:12 AM    WBC 14 03 (H) 10/12/2017 04:18 PM    Hemoglobin 12 5 11/04/2017 05:23 AM    Hemoglobin 11 5 (L) 11/03/2017 07:12 AM    Hemoglobin 12 9 10/12/2017 04:18 PM    Hematocrit 39 5 11/04/2017 05:23 AM    Hematocrit 37 3 11/03/2017 07:12 AM    Hematocrit 41 2 10/12/2017 04:18 PM    MCV 88 11/04/2017 05:23 AM    MCV 89 11/03/2017 07:12 AM    MCV 89 10/12/2017 04:18 PM    Platelets 611 97/27/4736 05:23 AM    Platelets 383 05/80/0036 07:12 AM    Platelets 673 88/18/7069 04:18 PM    BUN 28 (H) 11/22/2017 08:07 AM    BUN 36 (H) 11/15/2017 08:11 AM    BUN 22 11/07/2017 05:47 AM    Sodium 141 11/22/2017 08:07 AM    Sodium 139 11/15/2017 08:11 AM    Sodium 138 11/07/2017 05:47 AM    Potassium 3 2 (L) 11/22/2017 08:07 AM    Potassium 2 9 (L) 11/15/2017 08:11 AM    Potassium 3 6 11/07/2017 05:47 AM    Chloride 101 11/22/2017 08:07 AM    Chloride 102 11/15/2017 08:11 AM    Chloride 101 11/07/2017 05:47 AM    CO2 31 11/22/2017 08:07 AM    CO2 32 11/15/2017 08:11 AM    CO2 29 11/07/2017 05:47 AM    Creatinine 1 72 (H) 11/22/2017 08:07 AM    Creatinine 1 99 (H) 11/15/2017 08:11 AM    Creatinine 1 71 (H) 11/07/2017 05:47 AM    AST 16 10/12/2017 04:18 PM    AST 13 10/11/2017 04:36 PM    AST 31 09/05/2017 04:59 PM    ALT 30 10/12/2017 04:18 PM    ALT 24 10/11/2017 04:36 PM    ALT 40 09/05/2017 04:59 PM    Albumin 2 8 (L) 10/12/2017 04:18 PM    Albumin 3 0 (L) 10/11/2017 04:36 PM    Albumin 2 8 (L) 09/05/2017 04:59 PM    Cholesterol 110 11/04/2017 05:23 AM    Cholesterol 104 09/06/2017 05:07 AM    HDL, Direct 27 (L) 11/04/2017 05:23 AM    HDL, Direct 25 (L) 09/06/2017 05:07 AM    Triglycerides 177 (H) 11/04/2017 05:23 AM    Triglycerides 175 (H) 09/06/2017 05:07 AM             Portions of the record may have been created with voice recognition software  Occasional wrong word or "sound a like" substitutions may have occurred due to the inherent limitations of voice recognition software  Read the chart carefully and recognize, using context, where substitutions have occurred

## 2018-08-02 ENCOUNTER — OFFICE VISIT (OUTPATIENT)
Dept: GASTROENTEROLOGY | Facility: MEDICAL CENTER | Age: 58
End: 2018-08-02
Payer: MEDICARE

## 2018-08-02 VITALS
TEMPERATURE: 97.3 F | BODY MASS INDEX: 41.75 KG/M2 | WEIGHT: 315 LBS | HEIGHT: 73 IN | SYSTOLIC BLOOD PRESSURE: 130 MMHG | DIASTOLIC BLOOD PRESSURE: 68 MMHG | HEART RATE: 67 BPM

## 2018-08-02 DIAGNOSIS — K59.03 DRUG-INDUCED CONSTIPATION: ICD-10-CM

## 2018-08-02 DIAGNOSIS — R13.10 DYSPHAGIA, UNSPECIFIED TYPE: Primary | ICD-10-CM

## 2018-08-02 DIAGNOSIS — R13.19 ESOPHAGEAL DYSPHAGIA: ICD-10-CM

## 2018-08-02 DIAGNOSIS — K21.9 GASTROESOPHAGEAL REFLUX DISEASE WITHOUT ESOPHAGITIS: ICD-10-CM

## 2018-08-02 PROCEDURE — 99214 OFFICE O/P EST MOD 30 MIN: CPT | Performed by: PHYSICIAN ASSISTANT

## 2018-08-02 RX ORDER — OMEPRAZOLE 20 MG/1
20 CAPSULE, DELAYED RELEASE ORAL DAILY
Qty: 30 CAPSULE | Refills: 2 | Status: ON HOLD | OUTPATIENT
Start: 2018-08-02 | End: 2020-01-31

## 2018-08-02 NOTE — PROGRESS NOTES
[facility] - Outpatient Follow-up Note  Estrellita Harmon 56 y o  male MRN: [id number]  Encounter: [id number]          ASSESSMENT AND PLAN:      1  Dysphagia, unspecified type: He has had a motility study of the esophagus, which revealed motility disorder/ spasm of the esophagus  He was started on amlodipine and the dose has been increased  This was previously working well for him but he states that his dysphagia is now getting worse  He states that he feels like food gets stuck midway down his esophagus  He is vomiting secondary to the obstructive feeling  We could consider switching amlodipine to diltiazem however, would schedule him for EGD at this time to evaluate for stricture given last EGD was over 1 year ago  -  Continue amlodipine  - discussed eating habits including taking small bites, chewing well, remaining upright during at 30 min after meals  - Case request operating room: ESOPHAGOGASTRODUODENOSCOPY (EGD)  -please obtain cardiac clearance and approval to hold plavix prior to procedure  - omeprazole (PriLOSEC) 20 mg delayed release capsule; Take 1 capsule (20 mg total) by mouth daily  Dispense: 30 capsule; Refill: 2  -  Risks and benefits including anesthesia complications, bleeding, infection, perforation were discussed  He understands and agrees to proceed with procedure  2  Drug-induced constipation:  Most likely secondary to narcotic pain medication use  He is currently on MT is a twice a day, he states that he still is only having a bowel movement once a week and is having difficulty  He has tried MiraLax but states that this gives him diarrhea  We will try switching him from Page to Linzess once daily, he knows he may use MiraLax in addition to Ralph Bradford if needed  - Linaclotide 290 MCG CAPS; Take 1 capsule by mouth daily  Dispense: 30 capsule; Refill: 3  - increased exercise  - drink at least 8, 8 oz glasses of water daily    3   Gastroesophageal reflux disease without esophagitishe denies symptoms of heartburn, he is Currently on 40 mg of omeprazole daily  He does not wish to be on this medication anymore and would like to taper off this  We discussed keeping him on at least 20 mg daily until further evaluation with EGD  Last EGD was February 2017, which revealed gastritis  -  Omeprazole 20 mg daily      ______________________________________________________________________    SUBJECTIVE:  Hunter Anderson is a 55 yo male with  Extensive past medical history including COPD, coronary artery disease, obstructive sleep apnea, CHF, GERD, history of stroke who is here for follow-up of nonspecific motility disorder  He was previously responding well to omeprazole and amlodipine, he states that these are no longer working for him  He has a feeling that food gets stuck midway down with swallowing that he has to vomit them back up  He also complains of constipation, he is currently on amitiza  Twice daily without relief  He states he is having a bowel movement once weekly  This is most likely secondary to narcotic pain medication  REVIEW OF SYSTEMS IS OTHERWISE NEGATIVE        Historical Information   Past Medical History:   Diagnosis Date    Acute on chronic diastolic congestive heart failure (HCC)     Altered gait     Alzheimer disease     per patients,,early onset     Angina pectoris ([facility] [facility]     Anxiety     Arthritis     Brain aneurysm     Cardiac disease     Chest pain 1/13/2016    Chronic pain     back/ right groin and rle- has morphine pump    Constipation     [facility]     Coronary artery disease     CPAP (continuous positive airway pressure) dependence     Dependent on walker for ambulation     w/c for long distance    Depression     Diabetes mellitus ([facility])     insulin dependent    Dizziness     occ    Enlarged prostate     GERD (gastroesophageal reflux disease)     Heart failure ([facility])     Hiatal hernia     Hypercholesterolemia     Hypertension     [facility] infarction) [facility] [address] [facility]     Migraine     Neuropathy     Oxygen dependent     Q HS  2LPM with CPAP and prn during day 2-3 LPM     Shortness of breath     Sleep apnea     Stented coronary artery     [facility]     vision loss    Urinary frequency     Wears dentures     Wears glasses      Past Surgical History:   Procedure Laterality Date    BACK SURGERY      BRAIN SURGERY      CARDIAC CATHETERIZATION      with stents    CEREBRAL ANEURYSM REPAIR      with coils    COLONOSCOPY      ESOPHAGOGASTRODUODENOSCOPY N/A 7/1/2016    Procedure: ESOPHAGOGASTRODUODENOSCOPY (EGD); Surgeon: Judy Bustillos MD;  [facility]; Service:     HERNIA REPAIR      HIATAL HERNIA REPAIR      KNEE ARTHROSCOPY Right     KNEE ARTHROSCOPY Right     PERONEAL NERVE DECOMPRESSION Right     [place] ESOPHAGOGASTRODUODENOSCOPY TRANSORAL DIAGNOSTIC N/A 2/27/2017    Procedure: ESOPHAGOGASTRODUODENOSCOPY (EGD); Surgeon: Judy Bustillos MD;  [facility];   Service: Gastroenterology    [place] INSERT SPINE INFUSN [address] [place] [address] N/A 1/19/2017    Procedure: REMOVAL / Jackalyn Orts;  Surgeon: Letty Mullins MD;  [facility];  Service: Orthopedics    [place] INSERT SPINE INFUSN [address] [place] [address] N/A 5/16/2016    Procedure: REPLACEMENT AND PROGRAM PUMP ;  Surgeon: Letty Mullins MD;  [facility];  Service: Orthopedics     Social History   History   Alcohol Use No     History   Drug Use No     History   Smoking Status    Never Smoker   Smokeless Tobacco    Never Used     Family History   Problem Relation Age of Onset    Diabetes unspecified Mother     Diabetes unspecified Brother     Diabetes unspecified Paternal Uncle     Diabetes unspecified Maternal Grandmother     Diabetes unspecified Paternal Grandmother     Diabetes unspecified Brother        Meds/Allergies       Current Outpatient Prescriptions:     albuterol (ACCUNEB) 1 25 MG/3ML nebulizer solution    albuterol (PROVENTIL HFA,VENTOLIN HFA) 90 mcg/act inhaler    amLODIPine (NORVASC) 5 mg tablet    aspirin 81 MG tablet    atorvastatin (LIPITOR) 40 mg tablet    baclofen 10 mg tablet    Cholecalciferol (VITAMIN D3) 5000 units CAPS    citalopram (CeleXA) 40 mg tablet    clopidogrel (PLAVIX) 75 mg tablet    dicyclomine (BENTYL) 20 mg tablet    fluticasone-salmeterol (ADVAIR) 250-50 mcg/dose    gabapentin (NEURONTIN) 800 mg tablet    insulin aspart (NOVOLOG FLEXPEN) 100 Units/mL injection pen    insulin degludec (TRESIBA FLEXTOUCH) 200 units/mL CONCENTRATED U-200 injection pen    isosorbide mononitrate (IMDUR) 30 mg 24 hr tablet    memantine (NAMENDA) 10 mg tablet    morphine (MS CONTIN) 100 mg 12 hr tablet    nebivolol (BYSTOLIC) 10 mg tablet    nitroglycerin (NITROSTAT) 0 4 mg SL tablet    oxyCODONE (ROXICODONE) 30 MG immediate release tablet    polyethylene glycol (MIRALAX) 17 g packet    potassium chloride (K-DUR,KLOR-CON) 10 mEq tablet    ranolazine (RANEXA) 1000 MG SR tablet    tamsulosin (FLOMAX) 0 4 mg    TiZANidine (ZANAFLEX) 2 MG capsule    topiramate (TOPAMAX) 100 mg tablet    torsemide (DEMADEX) 100 mg tablet    traZODone (DESYREL) 100 mg tablet    zolpidem (AMBIEN) 10 mg tablet    Linaclotide 290 MCG CAPS    omeprazole (PriLOSEC) 20 mg delayed release capsule    No Known Allergies        Objective     Blood pressure 130/68, pulse 67, temperature (!) 97 3 °F (36 3 °C), temperature source Tympanic Core, height 6' 1" (1 854 m), weight (!) 176 kg (387 lb)  Body mass index is 51 06 kg/m²        PHYSICAL EXAM:      General Appearance:   Alert, cooperative, no distress, morbidly obsese   HEENT:   Normocephalic, atraumatic, anicteric, no discharge from right eye, no discharge from left eye, no scleral icterus      Neck:  Supple, symmetrical, trachea midline, no stridor    Lungs:   Clear to auscultation bilaterally; no rales, rhonchi or wheezing; respirations unlabored    Heart[de-identified]   Regular rate and rhythm; no murmur, rub, or gallop  Abdomen:   Soft, non-tender, non-distended; normal bowel sounds; no masses, no organomegaly    Genitalia:   Deferred    Rectal:   Deferred    Extremities:  No cyanosis, clubbing or 2+ pitting edema    Pulses:  2+ and symmetric    Skin:  No jaundice, rashes, or lesions    Lymph nodes:  No palpable cervical lymphadenopathy        Lab Results:   No visits with results within 1 Day(s) from this visit  Latest known visit with results is:   Office Visit on 04/24/2018   Component Date Value     HGB A1C 04/24/2018 6 0          Radiology Results:   No results found

## 2018-08-02 NOTE — PATIENT INSTRUCTIONS
Pt is scheduled for 8/23/18 at [facility]  Prep and diabetic instructions gone over by the MA  A medication clearance and procedure clearance was faxed to Dr Mcmillan Ear pts cardiologist at [facility]

## 2018-08-02 NOTE — ASSESSMENT & PLAN NOTE
Lab Results   Component Value Date    HGBA1C 6 0 04/24/2018       No results for input(s): POCGLU in the last 72 hours  Blood Sugar Average: Last 72 hrs:  sugars high for the past few months as he has not been watching diet - for now increase tresiba to 130 units and continue humalog at current dose - when he restarts liquid diet he will cut back humalog to 1/2 dose

## 2018-08-08 ENCOUNTER — TELEPHONE (OUTPATIENT)
Dept: GASTROENTEROLOGY | Facility: CLINIC | Age: 58
End: 2018-08-08

## 2018-08-08 NOTE — TELEPHONE ENCOUNTER
Spoke to [facility]   They have clearance , the doctor is away until 8/13/18 and they will have it done and faxed then

## 2018-08-08 NOTE — PROGRESS NOTES
Chemodenervation  Date/Time: 8/9/2018 11:47 AM  Performed by: Betsey Chaves  Authorized by: Quentin Vyas details:     Prepped With: Alcohol    Anesthesia (see MAR for exact dosages): Anesthesia method:  None  Procedure details:     Position:  Upright  Botox:     Botox Type:  Type A    Brand:  Botox    mL's of Botulinum Toxin:  200    Final Concentration per CC:  200 units    Needle Gauge:  30 G 2 5 inch  Procedures:     Botox Procedures: chronic headache      Indications: migraines    Injection Location:     Head / Face:  L superior cervical paraspinal, R superior cervical paraspinal, L , R , L frontalis, R frontalis, L medial occipitalis, R medial occipitalis, procerus, R temporalis, L temporalis, R superior trapezius and L superior trapezius    L  injection amount:  5 unit(s)    R  injection amount:  5 unit(s)    L lateral frontalis:  5 unit(s)    R lateral frontalis:  5 unit(s)    L medial frontalis:  5 unit(s)    R medial frontalis:  5 unit(s)    L temporalis injection amount:  20 unit(s)    R temporalis injection amount:  20 unit(s)    Procerus injection amount:  5 unit(s)    L medial occipitalis injection amount:  15 unit(s)    R medial occipitalis injection amount:  15 unit(s)    L superior cervical paraspinal injection amount:  10 unit(s)    R superior cervical paraspinal injection amount:  10 unit(s)    L superior trapezius injection amount:  15 unit(s)    R superior trapezius injection amount:  15 unit(s)  Total Units:     Total units used:  200    Total units discarded:  0  Post-procedure details:     Chemodenervation:  Chronic migraine    Patient tolerance of procedure: Tolerated well, no immediate complications  Comments:      35 units in the occipital region  5 units in the Obicularis oculi region bilaterally  All medically necessary

## 2018-08-09 ENCOUNTER — PROCEDURE VISIT (OUTPATIENT)
Dept: NEUROLOGY | Facility: CLINIC | Age: 58
End: 2018-08-09
Payer: MEDICARE

## 2018-08-09 VITALS — HEART RATE: 63 BPM | TEMPERATURE: 98.2 F | DIASTOLIC BLOOD PRESSURE: 80 MMHG | SYSTOLIC BLOOD PRESSURE: 169 MMHG

## 2018-08-09 DIAGNOSIS — G43.709 CHRONIC MIGRAINE WITHOUT AURA WITHOUT STATUS MIGRAINOSUS, NOT INTRACTABLE: Primary | ICD-10-CM

## 2018-08-09 PROCEDURE — 64615 CHEMODENERV MUSC MIGRAINE: CPT | Performed by: PHYSICIAN ASSISTANT

## 2018-08-15 LAB — HBA1C MFR BLD HPLC: 7.6 %

## 2018-08-18 NOTE — PROGRESS NOTES
Please call the patient regarding his abnormal result  a1c almost at goal- 7 6 - send over f s log in 2 weeks - low vitamin D - is he taking any supplementations ?

## 2018-08-20 ENCOUNTER — TELEPHONE (OUTPATIENT)
Dept: ENDOCRINOLOGY | Facility: CLINIC | Age: 58
End: 2018-08-20

## 2018-08-20 NOTE — TELEPHONE ENCOUNTER
----- Message from Shai Loaiza MD sent at 8/18/2018  7:03 PM EDT -----  Please call the patient regarding his abnormal result  a1c almost at goal- 7 6 - send over f s log in 2 weeks - low vitamin D - is he taking any supplementations ?

## 2018-08-21 ENCOUNTER — TELEPHONE (OUTPATIENT)
Dept: GASTROENTEROLOGY | Facility: CLINIC | Age: 58
End: 2018-08-21

## 2018-08-22 ENCOUNTER — ANESTHESIA EVENT (OUTPATIENT)
Dept: GASTROENTEROLOGY | Facility: HOSPITAL | Age: 58
End: 2018-08-22
Payer: MEDICARE

## 2018-08-23 ENCOUNTER — ANESTHESIA (OUTPATIENT)
Dept: GASTROENTEROLOGY | Facility: HOSPITAL | Age: 58
End: 2018-08-23
Payer: MEDICARE

## 2018-08-23 ENCOUNTER — HOSPITAL ENCOUNTER (OUTPATIENT)
Facility: HOSPITAL | Age: 58
Setting detail: OUTPATIENT SURGERY
Discharge: HOME | End: 2018-08-23
Attending: INTERNAL MEDICINE | Admitting: INTERNAL MEDICINE
Payer: MEDICARE

## 2018-08-23 VITALS
RESPIRATION RATE: 22 BRPM | HEIGHT: 73 IN | HEART RATE: 71 BPM | OXYGEN SATURATION: 94 % | DIASTOLIC BLOOD PRESSURE: 72 MMHG | BODY MASS INDEX: 41.75 KG/M2 | WEIGHT: 315 LBS | TEMPERATURE: 99.1 F | SYSTOLIC BLOOD PRESSURE: 140 MMHG

## 2018-08-23 DIAGNOSIS — R13.10 DYSPHAGIA, UNSPECIFIED TYPE: ICD-10-CM

## 2018-08-23 LAB — GLUCOSE SERPL-MCNC: 183 MG/DL (ref 65–140)

## 2018-08-23 PROCEDURE — 88305 TISSUE EXAM BY PATHOLOGIST: CPT | Performed by: PATHOLOGY

## 2018-08-23 PROCEDURE — 88342 IMHCHEM/IMCYTCHM 1ST ANTB: CPT | Performed by: PATHOLOGY

## 2018-08-23 PROCEDURE — 88313 SPECIAL STAINS GROUP 2: CPT | Performed by: PATHOLOGY

## 2018-08-23 PROCEDURE — 88341 IMHCHEM/IMCYTCHM EA ADD ANTB: CPT | Performed by: PATHOLOGY

## 2018-08-23 PROCEDURE — 43239 EGD BIOPSY SINGLE/MULTIPLE: CPT | Performed by: INTERNAL MEDICINE

## 2018-08-23 PROCEDURE — 82948 REAGENT STRIP/BLOOD GLUCOSE: CPT

## 2018-08-23 RX ORDER — PROPOFOL 10 MG/ML
INJECTION, EMULSION INTRAVENOUS AS NEEDED
Status: DISCONTINUED | OUTPATIENT
Start: 2018-08-23 | End: 2018-08-23 | Stop reason: SURG

## 2018-08-23 RX ORDER — SODIUM CHLORIDE 9 MG/ML
125 INJECTION, SOLUTION INTRAVENOUS CONTINUOUS
Status: DISCONTINUED | OUTPATIENT
Start: 2018-08-23 | End: 2018-08-23 | Stop reason: HOSPADM

## 2018-08-23 RX ADMIN — PROPOFOL 50 MG: 10 INJECTION, EMULSION INTRAVENOUS at 10:44

## 2018-08-23 RX ADMIN — PROPOFOL 50 MG: 10 INJECTION, EMULSION INTRAVENOUS at 10:48

## 2018-08-23 RX ADMIN — SODIUM CHLORIDE 125 ML/HR: 0.9 INJECTION, SOLUTION INTRAVENOUS at 10:09

## 2018-08-23 RX ADMIN — LIDOCAINE HYDROCHLORIDE 60 MG: 20 INJECTION, SOLUTION INTRAVENOUS at 10:36

## 2018-08-23 RX ADMIN — PROPOFOL 100 MG: 10 INJECTION, EMULSION INTRAVENOUS at 10:35

## 2018-08-23 RX ADMIN — PROPOFOL 100 MG: 10 INJECTION, EMULSION INTRAVENOUS at 10:40

## 2018-08-23 NOTE — OP NOTE
OPERATIVE REPORT  PATIENT NAME: Robi Castaneda    :  1960  MRN: [id number]  Pt [facility]: [facility]    SURGERY DATE: 2018    Surgeon(s) and Role:     * Ki Pace MD - Primary    ESOPHAGOGASTRODUODENOSCOPY    PROCEDURE: EGD    SEDATION: Monitored anesthesia care, check anesthesia records    ASA Class: 4    INDICATIONS:  Reflux and dysphagia    CONSENT:  Informed consent was obtained for the procedure, including sedation after explaining the risks and benefits of the procedure  Risks including but not limited to bleeding, perforation, infection, and missed lesion  PREPARATION:   Telemetry, pulse oximetry, blood pressure were monitored throughout the procedure  Patient was identified by myself both verbally and by visual inspection of ID band  DESCRIPTION:   Patient was placed in the left lateral decubitus position and was sedated with the above medication  The gastroscope was introduced in to the oropharynx and the esophagus was intubated under direct visualization  Scope was passed down the esophagus up to 2nd part of the duodenum  A careful inspection was made as the gastroscope was withdrawn, including a retroflexed view of the stomach; findings and interventions are described below  FINDINGS:    #1  Esophagus-normal  Random biopsies were taken from the proximal esophagus to rule out eosinophilic esophagitis  #2  Stomach-nodular gastritis was noted in the antrum  Random biopsies were taken from the antrum body to rule out Helicobacter pylori infection  #3  Duodenum-normal          IMPRESSIONS:      Nodular gastritis    RECOMMENDATIONS:     Await pathology results  Continue current medications  Follow-up with endoscopist    COMPLICATIONS:  None; patient tolerated the procedure well            DISPOSITION: PACU           CONDITION: Stable      SIGNATURE: Ki Pace MD  DATE: 2018  TIME: 10:48 AM

## 2018-08-23 NOTE — ANESTHESIA POSTPROCEDURE EVALUATION
Post-Op Assessment Note      CV Status:  Stable    Mental Status:  Alert and awake    Hydration Status:  Euvolemic    PONV Controlled:  Controlled    Airway Patency:  Patent    Post Op Vitals Reviewed: Yes          Staff: AnesthesiologistBOGDAN           /72 (08/23/18 1108)    Temp      Pulse 71 (08/23/18 1108)   Resp 22 (08/23/18 1108)    SpO2 93 % (08/23/18 1108)

## 2018-08-23 NOTE — ANESTHESIA PREPROCEDURE EVALUATION
Review of Systems/Medical History  Patient summary reviewed  Chart reviewed  No history of anesthetic complications     Cardiovascular  Exercise tolerance (METS): <4,  Hyperlipidemia, Hypertension , Past MI , CAD , Cardiac stents > 6 months Angina with exertion, CHF compensated CHF, Orthopnea,    Pulmonary  COPD severe- O2 dependent , Shortness of breath (at rest), Sleep apnea CPAP,        GI/Hepatic  Dysphagia,   GERD ,        Negative  ROS        Endo/Other  Diabetes Insulin,      GYN       Hematology  Negative hematology ROS      Musculoskeletal  Back pain , lumbar pain,   Comment: Morphine spinal pump   Arthritis     Neurology    CVA (vision loss) , residual symptoms, Headaches,   Comment: Early stage alzheimers  Cerebral aneurysm repair Psychology   Anxiety, Depression ,              Physical Exam    Airway    Mallampati score: III  TM Distance: >3 FB  Neck ROM: full     Dental   Comment: edentulous,     Cardiovascular  Rhythm: regular, Rate: normal, Cardiovascular exam normal    Pulmonary  Decreased breath sounds (right decreased >left), No rales,     Other Findings        Anesthesia Plan  ASA Score- 4     Anesthesia Type- IV sedation with anesthesia and general with ASA Monitors  Additional Monitors:   Airway Plan:     Comment: I discussed the risks involved in providing anesthesia   Plan Factors-    Induction- intravenous  Postoperative Plan-     Informed Consent- Anesthetic plan and risks discussed with patient  I personally reviewed this patient with the CRNA  Discussed and agreed on the Anesthesia Plan with the CRNA  Noé Aguilar

## 2018-08-23 NOTE — DISCHARGE INSTRUCTIONS
Upper Endoscopy   WHAT YOU NEED TO KNOW:   An upper endoscopy is also called an upper gastrointestinal (GI) endoscopy, or an esophagogastroduodenoscopy (EGD)  You may feel bloated, gassy, or have some abdominal discomfort after your procedure  Your throat may be sore for 24 to 36 hours  You may burp or pass gas from air that is still inside your body  DISCHARGE INSTRUCTIONS:   Call 911 for any of the following:   · You have sudden chest pain or trouble breathing  Seek care immediately if:   · You feel dizzy or faint  · You have trouble swallowing  · Your bowel movements are very dark or black  · Your abdomen is hard and firm and you have severe pain  · You vomit blood  Contact your healthcare provider if:   · You feel full or bloated and cannot burp or pass gas  · You have not had a bowel movement for 3 days after your procedure  · You have neck pain  · You have a fever or chills  · You have nausea or are vomiting  · You have a rash or hives  · You have questions or concerns about your endoscopy  Relieve a sore throat:  Suck on throat lozenges or crushed ice  Gargle with a small amount of warm salt water  Mix 1 teaspoon of salt and 1 cup of warm water to make salt water  Relieve gas and discomfort from bloating:  Lie on your right side with a heating pad on your abdomen  Take short walks to help pass gas  Eat small meals until bloating is relieved  Rest after your procedure: You have been given medicine to relax you  Do not  drive or make important decisions until the day after your procedure  Return to your normal activity as directed  You can usually return to work the day after your procedure  Follow up with your healthcare provider as directed:  Write down your questions so you remember to ask them during your visits     © 2017 [address] is for End User's use only and may not be sold, redistributed or otherwise used for commercial purposes  All illustrations and images included in CareNotes® are the copyrighted property of [employer]  or [employer]   The above information is an  only  It is not intended as medical advice for individual conditions or treatments  Talk to your doctor, nurse or pharmacist before following any medical regimen to see if it is safe and effective for you  Gastritis   WHAT YOU NEED TO KNOW:   What is gastritis? Gastritis is inflammation or irritation of the lining of your stomach  What increases my risk for gastritis? · Infection with bacteria, a virus, or a parasite    · NSAIDs, aspirin, or steroid medicine    · Use of tobacco products or alcohol    · Trauma such as an injury to your stomach or intestine    · Autoimmune disorders such as diabetes, thyroid disease, or Crohn disease    · Stress    · Age older than 60 years    · Illegal drugs, such as cocaine  What are the signs and symptoms of gastritis? · Stomach pain, burning, or tenderness when you press on it    · Stomach fullness or tightness    · Nausea or vomiting    · Loss of appetite, or feeling full quickly when you eat    · Bad breath    · Fatigue or feeling more tired than usual    · Heartburn  How is gastritis diagnosed? Your healthcare provider will ask about your signs and symptoms and examine you  You may need any of the following:  · Blood tests  may be used to show an infection, dehydration, or anemia (low red blood cell levels)  · A bowel movement sample  may be tested for blood or the germ that may be causing your gastritis  · A breath test  may show if H pylori is causing your gastritis  You will be given a liquid to drink  Then you will breathe into a bag  Your healthcare provider will measure the amount of carbon dioxide in your breath  Extra amounts of carbon dioxide may mean you have an H pylori infection      · An endoscopy  may be used to look for irritation or bleeding in your stomach  Your healthcare provider will use an endoscope (tube with a light and camera on the end) during the procedure  He or she may take a sample from your stomach to be tested  How is gastritis treated? Your symptoms may go away without treatment  Treatment will depend on what is causing your gastritis  Your healthcare provider may recommend changes to the medicines you take  Medicines may be given to help treat a bacterial infection or decrease stomach acid  How can I manage or prevent gastritis? · Do not smoke  Nicotine and other chemicals in cigarettes and cigars can make your symptoms worse and cause lung damage  Ask your healthcare provider for information if you currently smoke and need help to quit  E-cigarettes or smokeless tobacco still contain nicotine  Talk to your healthcare provider before you use these products  · Do not drink alcohol  Alcohol can prevent healing and make your gastritis worse  Talk to your healthcare provider if you need help to stop drinking  · Do not take NSAIDs or aspirin unless directed  These and similar medicines can cause irritation of your stomach lining  If your healthcare provider says it is okay to take NSAIDs, take them with food  · Do not eat foods that cause irritation  Foods such as oranges and salsa can cause burning or pain  Eat a variety of healthy foods  Examples include fruits (not citrus), vegetables, low-fat dairy products, beans, whole-grain breads, and lean meats and fish  Try to eat small meals, and drink water with your meals  Do not eat for at least 3 hours before you go to bed  · Find ways to relax and decrease stress  Stress can increase stomach acid and make gastritis worse  Activities such as yoga, meditation, or listening to music can help you relax  Spend time with friends, or do things you enjoy  Call 911 for any of the following:   · You develop chest pain or shortness of breath  When should I seek immediate care? · You vomit blood  · You have black or bloody bowel movements  · You have severe stomach or back pain  When should I contact my healthcare provider? · You have a fever  · You have new or worsening symptoms, even after treatment  · You have questions or concerns about your condition or care  CARE AGREEMENT:   You have the right to help plan your care  Learn about your health condition and how it may be treated  Discuss treatment options with your caregivers to decide what care you want to receive  You always have the right to refuse treatment  The above information is an  only  It is not intended as medical advice for individual conditions or treatments  Talk to your doctor, nurse or pharmacist before following any medical regimen to see if it is safe and effective for you  © 2017 [address] Information is for End User's use only and may not be sold, redistributed or otherwise used for commercial purposes  All illustrations and images included in CareNotes® are the copyrighted property of [employer]  or [employer] Ch[employer]

## 2018-08-23 NOTE — H&P (VIEW-ONLY)
[facility] - Outpatient Follow-up Note  Corbin Llanos 56 y o  male MRN: [id number]  Encounter: [id number]          ASSESSMENT AND PLAN:      1  Dysphagia, unspecified type: He has had a motility study of the esophagus, which revealed motility disorder/ spasm of the esophagus  He was started on amlodipine and the dose has been increased  This was previously working well for him but he states that his dysphagia is now getting worse  He states that he feels like food gets stuck midway down his esophagus  He is vomiting secondary to the obstructive feeling  We could consider switching amlodipine to diltiazem however, would schedule him for EGD at this time to evaluate for stricture given last EGD was over 1 year ago  -  Continue amlodipine  - discussed eating habits including taking small bites, chewing well, remaining upright during at 30 min after meals  - Case request operating room: ESOPHAGOGASTRODUODENOSCOPY (EGD)  -please obtain cardiac clearance and approval to hold plavix prior to procedure  - omeprazole (PriLOSEC) 20 mg delayed release capsule; Take 1 capsule (20 mg total) by mouth daily  Dispense: 30 capsule; Refill: 2  -  Risks and benefits including anesthesia complications, bleeding, infection, perforation were discussed  He understands and agrees to proceed with procedure  2  Drug-induced constipation:  Most likely secondary to narcotic pain medication use  He is currently on MT is a twice a day, he states that he still is only having a bowel movement once a week and is having difficulty  He has tried MiraLax but states that this gives him diarrhea  We will try switching him from Sells to Linzess once daily, he knows he may use MiraLax in addition to [address] [place] [address] if needed  - Linaclotide 290 MCG CAPS; Take 1 capsule by mouth daily  Dispense: 30 capsule; Refill: 3  - increased exercise  - drink at least 8, 8 oz glasses of water daily    3   Gastroesophageal reflux disease without esophagitishe denies symptoms of heartburn, he is Currently on 40 mg of omeprazole daily  He does not wish to be on this medication anymore and would like to taper off this  We discussed keeping him on at least 20 mg daily until further evaluation with EGD  Last EGD was February 2017, which revealed gastritis  -  Omeprazole 20 mg daily      ______________________________________________________________________    SUBJECTIVE:  Richard Victoria is a 57 yo male with  Extensive past medical history including COPD, coronary artery disease, obstructive sleep apnea, CHF, GERD, history of stroke who is here for follow-up of nonspecific motility disorder  He was previously responding well to omeprazole and amlodipine, he states that these are no longer working for him  He has a feeling that food gets stuck midway down with swallowing that he has to vomit them back up  He also complains of constipation, he is currently on amitiza  Twice daily without relief  He states he is having a bowel movement once weekly  This is most likely secondary to narcotic pain medication  REVIEW OF SYSTEMS IS OTHERWISE NEGATIVE        Historical Information   Past Medical History:   Diagnosis Date    Acute on chronic diastolic congestive heart failure (HCC)     Altered gait     Alzheimer disease     per patients,,early onset     [facility] p[facility]is [facility]     Anxiety     Arthritis     Brain aneurysm     Cardiac disease     Chest pain 1/13/2016    Chronic pain     back/ right groin and rle- has morphine pump    Constipation     [facility]     Coronary artery disease     CPAP (continuous positive airway pressure) dependence     Dependent on walker for ambulation     w/c for long distance    Depression     Diabetes mellitus ([facility])     insulin dependent    Dizziness     occ    Enlarged prostate     GERD (gastroesophageal reflux disease)     Heart failure ([facility])     Hiatal hernia     Hypercholesterolemia     Hypertension     MI [facility]my[facility] infarction) [facility]     Migraine     Neuropathy     Oxygen dependent     Q HS  2LPM with CPAP and prn during day 2-3 LPM     Shortness of breath     Sleep apnea     Stented coronary artery     Stroke [facility]     vision loss    Urinary frequency     Wears dentures     Wears glasses      Past Surgical History:   Procedure Laterality Date    BACK SURGERY      BRAIN SURGERY      CARDIAC CATHETERIZATION      with stents    CEREBRAL ANEURYSM REPAIR      with coils    COLONOSCOPY      ESOPHAGOGASTRODUODENOSCOPY N/A 7/1/2016    Procedure: ESOPHAGOGASTRODUODENOSCOPY (EGD); Surgeon: Estela Alejandro MD;  [facility]; Service:     HERNIA REPAIR      HIATAL HERNIA REPAIR      KNEE ARTHROSCOPY Right     KNEE ARTHROSCOPY Right     PERONEAL NERVE DECOMPRESSION Right     CO ESOPHAGOGASTRODUODENOSCOPY TRANSORAL DIAGNOSTIC N/A 2/27/2017    Procedure: ESOPHAGOGASTRODUODENOSCOPY (EGD); Surgeon: Estela Alejandro MD;  [facility];   Service: Gastroenterology    [place] INSERT SPINE INFUSN [address] [place] [address] N/A 1/19/2017    Procedure: REMOVAL / Stewart Potter;  Surgeon: Jeff Cyr MD;  [facility];  Service: Orthopedics    [place] INSERT SPINE INFUSN [address] [place] [address] N/A 5/16/2016    Procedure: REPLACEMENT AND PROGRAM PUMP ;  Surgeon: Jeff Cyr MD;  [facility];  Service: Orthopedics     Social History   History   Alcohol Use No     History   Drug Use No     History   Smoking Status    Never Smoker   Smokeless Tobacco    Never Used     Family History   Problem Relation Age of Onset    Diabetes unspecified Mother     Diabetes unspecified Brother     Diabetes unspecified Paternal Uncle     Diabetes unspecified Maternal Grandmother     Diabetes unspecified Paternal Grandmother     Diabetes unspecified Brother        Meds/Allergies       Current Outpatient Prescriptions:     albuterol (ACCUNEB) 1 25 MG/3ML nebulizer solution    albuterol (PROVENTIL HFA,VENTOLIN HFA) 90 mcg/act inhaler    amLODIPine (NORVASC) 5 mg tablet    aspirin 81 MG tablet    atorvastatin (LIPITOR) 40 mg tablet    baclofen 10 mg tablet    Cholecalciferol (VITAMIN D3) 5000 units CAPS    citalopram (CeleXA) 40 mg tablet    clopidogrel (PLAVIX) 75 mg tablet    dicyclomine (BENTYL) 20 mg tablet    fluticasone-salmeterol (ADVAIR) 250-50 mcg/dose    gabapentin (NEURONTIN) 800 mg tablet    insulin aspart (NOVOLOG FLEXPEN) 100 Units/mL injection pen    insulin degludec (TRESIBA FLEXTOUCH) 200 units/mL CONCENTRATED U-200 injection pen    isosorbide mononitrate (IMDUR) 30 mg 24 hr tablet    memantine (NAMENDA) 10 mg tablet    morphine (MS CONTIN) 100 mg 12 hr tablet    nebivolol (BYSTOLIC) 10 mg tablet    nitroglycerin (NITROSTAT) 0 4 mg SL tablet    oxyCODONE (ROXICODONE) 30 MG immediate release tablet    polyethylene glycol (MIRALAX) 17 g packet    potassium chloride (K-DUR,KLOR-CON) 10 mEq tablet    ranolazine (RANEXA) 1000 MG SR tablet    tamsulosin (FLOMAX) 0 4 mg    TiZANidine (ZANAFLEX) 2 MG capsule    topiramate (TOPAMAX) 100 mg tablet    torsemide (DEMADEX) 100 mg tablet    traZODone (DESYREL) 100 mg tablet    zolpidem (AMBIEN) 10 mg tablet    Linaclotide 290 MCG CAPS    omeprazole (PriLOSEC) 20 mg delayed release capsule    No Known Allergies        Objective     Blood pressure 130/68, pulse 67, temperature (!) 97 3 °F (36 3 °C), temperature source Tympanic Core, height 6' 1" (1 854 m), weight (!) 176 kg (387 lb)  Body mass index is 51 06 kg/m²        PHYSICAL EXAM:      General Appearance:   Alert, cooperative, no distress, morbidly obsese   HEENT:   Normocephalic, atraumatic, anicteric, no discharge from right eye, no discharge from left eye, no scleral icterus      Neck:  Supple, symmetrical, trachea midline, no stridor    Lungs:   Clear to auscultation bilaterally; no rales, rhonchi or wheezing; respirations unlabored    Heart[de-identified]   Regular rate and rhythm; no murmur, rub, or gallop  Abdomen:   Soft, non-tender, non-distended; normal bowel sounds; no masses, no organomegaly    Genitalia:   Deferred    Rectal:   Deferred    Extremities:  No cyanosis, clubbing or 2+ pitting edema    Pulses:  2+ and symmetric    Skin:  No jaundice, rashes, or lesions    Lymph nodes:  No palpable cervical lymphadenopathy        Lab Results:   No visits with results within 1 Day(s) from this visit  Latest known visit with results is:   Office Visit on 04/24/2018   Component Date Value     HGB A1C 04/24/2018 6 0          Radiology Results:   No results found

## 2018-09-10 RX ORDER — POTASSIUM CHLORIDE 20 MEQ/1
TABLET, EXTENDED RELEASE ORAL
Refills: 3 | COMMUNITY
Start: 2018-07-26 | End: 2019-06-04 | Stop reason: DRUGHIGH

## 2018-09-10 RX ORDER — HYDROXYZINE HYDROCHLORIDE 25 MG/1
TABLET, FILM COATED ORAL
COMMUNITY
Start: 2018-08-17 | End: 2019-02-18 | Stop reason: SDUPTHER

## 2018-09-10 RX ORDER — OMEPRAZOLE 40 MG/1
40 CAPSULE, DELAYED RELEASE ORAL DAILY
Refills: 3 | COMMUNITY
Start: 2018-08-10 | End: 2018-11-03 | Stop reason: SDUPTHER

## 2018-09-10 RX ORDER — SPIRONOLACTONE 50 MG/1
50 TABLET, FILM COATED ORAL DAILY
Refills: 5 | COMMUNITY
Start: 2018-08-10 | End: 2019-06-04 | Stop reason: ALTCHOICE

## 2018-09-10 RX ORDER — ACETAMINOPHEN 160 MG
TABLET,DISINTEGRATING ORAL
Refills: 11 | COMMUNITY
Start: 2018-07-30 | End: 2018-10-09

## 2018-09-10 RX ORDER — METOLAZONE 5 MG/1
5 TABLET ORAL 2 TIMES WEEKLY
Refills: 3 | COMMUNITY
Start: 2018-08-17 | End: 2019-06-04 | Stop reason: ALTCHOICE

## 2018-09-10 RX ORDER — CITALOPRAM 40 MG/1
TABLET ORAL
COMMUNITY
Start: 2018-09-07 | End: 2019-06-04 | Stop reason: ALTCHOICE

## 2018-09-10 RX ORDER — LOSARTAN POTASSIUM 25 MG/1
TABLET ORAL
COMMUNITY
Start: 2018-09-07 | End: 2018-11-07 | Stop reason: ALTCHOICE

## 2018-09-10 RX ORDER — VENLAFAXINE HYDROCHLORIDE 75 MG/1
75 CAPSULE, EXTENDED RELEASE ORAL
COMMUNITY
Start: 2018-08-17 | End: 2019-06-04 | Stop reason: ALTCHOICE

## 2018-09-11 NOTE — PROGRESS NOTES
Patient ID: Cheng Tran is a 56 y o  male  Assessment/Plan:   Problem List Items Addressed This Visit        Cardiovascular and Mediastinum    Chronic migraine without aura without status migrainosus, not intractable - Primary    Relevant Medications    citalopram (CeleXA) 40 mg tablet    venlafaxine (EFFEXOR-XR) 75 mg 24 hr capsule       Other    Depression    Relevant Medications    citalopram (CeleXA) 40 mg tablet    hydrOXYzine HCL (ATARAX) 25 mg tablet    venlafaxine (EFFEXOR-XR) 75 mg 24 hr capsule               Chronic migraine headaches:  - continue current treatment  Patient is going for gastric bypass in few weeks  We will see him back in about 4 months to see how he is doing with this headaches after that  Subjective:  HPI  We had the pleasure of evaluating Cheng Tran in neurological follow up today  As you know he is a 56year old right handed male  He his here today for evaluation of his headaches and memory loss  He is going for   Gastric bypass in September of 2018  Chronic Migraine headaches:   Previous medications- citalopram, amlodipine, Topamax, labetalol, lexapro, gabapentin, nortriptyline, oxycodone, opnana, motrin  Abortive: no triptan due to chest pain/cva   - Triggers- stress/tension  - Aura- none    Since last seen headaches have improved with Botox injections q 3 months  Since starting botox, the patient reports greater than 7 days of migraine relief from baseline, correlated with headache diary, decreased abortive medication use and decreased ER visits  How often: 2-3 headaches per week   Location- bilateral orbits, apex, bilateral occipital   Quality- sharp, throbbing  Severity- average pain level 7-8/10  Associated with- blurred vision, photophobia, phonophobia    Memory:   - Continues to have problem with short term memory  Feels depressed now more then before and that may have exacerbated his memory  - Namenda 10mg twice a day  Peripheral Neuropathy:   States for many years now he has tingling sensation in his feet  Especially on the tip of his toes  Symptoms seem to be worse in pm or at bedtime    - On gabapentin 800mg twice a day  The following portions of the patient's history were reviewed and updated as appropriate: allergies, current medications, past family history, past medical history, past social history, past surgical history and problem list     Objective:  Blood pressure 147/70, pulse 63, height 6' 1" (1 854 m), weight (!) 172 kg (379 lb)  Physical Exam   Constitutional: He appears well-developed  HENT:   Head: Normocephalic  Eyes: EOM are normal  Pupils are equal, round, and reactive to light  Neck: Normal range of motion  Cardiovascular: Normal rate  Pulmonary/Chest: Effort normal    Musculoskeletal: Normal range of motion  Neurological: He has normal strength and normal reflexes  Skin: Skin is warm  Psychiatric: He has a normal mood and affect  His speech is normal    Nursing note and vitals reviewed  Neurological Exam    Mental Status  The patient is alert  His speech is normal  His language is fluent with no aphasia  He has normal attention span and concentration  Cranial Nerves    CN II: The patient's visual acuity and visual fields are normal   CN III, IV, VI: The patient's pupils are equally round and reactive to light and ocular movements are normal   CN V: The patient has normal facial sensation  CN VII:  The patient has symmetric facial movement  CN VIII:  The patient's hearing is normal   CN IX, X: The patient has symmetric palate movement and normal gag reflex  CN XI: The patient's shoulder shrug strength is normal   CN XII: The patient's tongue is midline without atrophy or fasciculations  Motor   His strength is 5/5 throughout all four extremities        Sensory    Low of sensation to light touch in his lower extremity up to his knee bilaterally     Reflexes  Deep tendon reflexes are 2+ and symmetric in all four extremities with downgoing toes bilaterally  Gait and Coordination    Wide base and walks with a walker  Pass pointing bilaterally     ROS:  Review of Systems   Constitutional: Negative  HENT: Negative  Eyes: Negative  Respiratory: Negative  Cardiovascular: Negative  Gastrointestinal: Negative  Endocrine: Negative  Genitourinary: Negative  Musculoskeletal: Positive for gait problem  Skin: Negative  Allergic/Immunologic: Negative  Neurological: Positive for headaches  Hematological: Negative  Psychiatric/Behavioral: Negative           Memory complaints

## 2018-09-12 ENCOUNTER — OFFICE VISIT (OUTPATIENT)
Dept: NEUROLOGY | Facility: CLINIC | Age: 58
End: 2018-09-12
Payer: MEDICARE

## 2018-09-12 VITALS
DIASTOLIC BLOOD PRESSURE: 70 MMHG | WEIGHT: 315 LBS | HEIGHT: 73 IN | SYSTOLIC BLOOD PRESSURE: 147 MMHG | BODY MASS INDEX: 41.75 KG/M2 | HEART RATE: 63 BPM

## 2018-09-12 DIAGNOSIS — G43.709 CHRONIC MIGRAINE WITHOUT AURA WITHOUT STATUS MIGRAINOSUS, NOT INTRACTABLE: Primary | ICD-10-CM

## 2018-09-12 DIAGNOSIS — F32.89 OTHER DEPRESSION: ICD-10-CM

## 2018-09-12 PROCEDURE — 99213 OFFICE O/P EST LOW 20 MIN: CPT | Performed by: PSYCHIATRY & NEUROLOGY

## 2018-09-28 DIAGNOSIS — R13.19 ESOPHAGEAL DYSPHAGIA: ICD-10-CM

## 2018-09-28 RX ORDER — AMLODIPINE BESYLATE 5 MG/1
5 TABLET ORAL DAILY
Qty: 30 TABLET | Refills: 0 | Status: SHIPPED | OUTPATIENT
Start: 2018-09-28 | End: 2018-10-08 | Stop reason: SDUPTHER

## 2018-09-28 NOTE — TELEPHONE ENCOUNTER
DR NUNEZ PT    Pt called requesting a refill of amlodipine 5mg   [place] [facility] [phone]

## 2018-10-03 ENCOUNTER — OFFICE VISIT (OUTPATIENT)
Dept: GASTROENTEROLOGY | Facility: MEDICAL CENTER | Age: 58
End: 2018-10-03
Payer: MEDICARE

## 2018-10-03 VITALS
WEIGHT: 315 LBS | HEIGHT: 73 IN | BODY MASS INDEX: 41.75 KG/M2 | TEMPERATURE: 100.5 F | SYSTOLIC BLOOD PRESSURE: 130 MMHG | HEART RATE: 77 BPM | DIASTOLIC BLOOD PRESSURE: 72 MMHG

## 2018-10-03 DIAGNOSIS — K59.00 CONSTIPATION, UNSPECIFIED CONSTIPATION TYPE: ICD-10-CM

## 2018-10-03 DIAGNOSIS — R13.19 ESOPHAGEAL DYSPHAGIA: ICD-10-CM

## 2018-10-03 DIAGNOSIS — E55.9 VITAMIN D DEFICIENCY: ICD-10-CM

## 2018-10-03 DIAGNOSIS — K21.9 GASTROESOPHAGEAL REFLUX DISEASE WITHOUT ESOPHAGITIS: Primary | ICD-10-CM

## 2018-10-03 PROBLEM — R13.10 DYSPHAGIA: Status: RESOLVED | Noted: 2018-08-02 | Resolved: 2018-10-03

## 2018-10-03 PROCEDURE — 99214 OFFICE O/P EST MOD 30 MIN: CPT | Performed by: INTERNAL MEDICINE

## 2018-10-03 NOTE — PROGRESS NOTES
[facility] - Outpatient Follow-up Note  Angelica Irizarry 56 y o  male MRN: [id number]  Encounter: [id number]          ASSESSMENT AND PLAN:      1  Gastroesophageal reflux disease without esophagitis  2  Esophageal dysphagia  I will schedule him for a pH study with impedance testing and manometry to evaluate the dysphagia and his persistent reflux despite proton pump inhibitor therapy  I have asked him to continue the omeprazole for now  - Espoh manometry/24hr ph; Future    3  Constipation, unspecified constipation type  He feels Linzess and Amitiza the only medicines that have really helped his symptoms  He does not want to try MiraLax again  I encouraged him to take Linzess every day and continue the Amitiza once a day but he can take Amitiza twice a day if he feels he is getting backed up  I have asked him to let me know if he develops any side effects from the medications as we usually do not use these two medications together     ______________________________________________________________________    SUBJECTIVE:  He presents for follow-up after his recent upper endoscopy  He was found to have nodular gastritis but the exam was otherwise unremarkable  He continues to have reflux and dysphagia symptoms  He describes dysphagia for solids and liquids but denies any nausea, vomiting, abdominal pain diarrhea, bleeding, or weight loss  He sometimes feels regurgitation of liquids and food  He also complains of chronic constipation which has responded to Linzess daily and Amitiza daily as he is improved for about one bowel movement per week to one bowel movement every two days  REVIEW OF SYSTEMS IS OTHERWISE NEGATIVE        Historical Information   Past Medical History:   Diagnosis Date    Acute on chronic diastolic congestive heart failure (HCC)     Altered gait     Alzheimer disease     per patients,,early onset     Angina pectoris ([address]     Anxiety     Arthritis     Brain aneurysm     Cardiac disease     Chest pain 1/13/2016    Chronic pain     back/ right groin and rle- has morphine pump    Constipation     [facility]     Coronary artery disease     CPAP (continuous positive airway pressure) dependence     Dependent on walker for ambulation     w/c for long distance    Depression     Diabetes mellitus (HCC)     insulin dependent    Dizziness     occ    Dysphagia     Enlarged prostate     GERD (gastroesophageal reflux disease)     Heart failure ([facility]     Hiatal hernia     Hypercholesterolemia     Hypertension     [facility]) [facility]     Migraine     Morbid obesity ([facility]     Neuropathy     Oxygen dependent     Q HS  2LPM with CPAP and prn during day 2-3 LPM     Shortness of breath     Sleep apnea     Stented coronary artery     Stroke ([facility] [facility]     vision loss    Urinary frequency     Wears dentures     Wears glasses      Past Surgical History:   Procedure Laterality Date    BACK SURGERY      BRAIN SURGERY      CARDIAC CATHETERIZATION      with stents    CEREBRAL ANEURYSM REPAIR      with coils    COLONOSCOPY      ESOPHAGOGASTRODUODENOSCOPY N/A 7/1/2016    Procedure: ESOPHAGOGASTRODUODENOSCOPY (EGD); Surgeon: Sam Leavitt MD;  [facility]; Service:     HERNIA REPAIR      HIATAL HERNIA REPAIR      KNEE ARTHROSCOPY Right     KNEE ARTHROSCOPY Right     PERONEAL NERVE DECOMPRESSION Right     [place] ESOPHAGOGASTRODUODENOSCOPY TRANSORAL DIAGNOSTIC N/A 2/27/2017    Procedure: ESOPHAGOGASTRODUODENOSCOPY (EGD); Surgeon: Sam Leavitt MD;  [facility]; Service: Gastroenterology    [place] ESOPHAGOGASTRODUODENOSCOPY TRANSORAL DIAGNOSTIC N/A 8/23/2018    Procedure: ESOPHAGOGASTRODUODENOSCOPY (EGD) with biopsy;  Surgeon: Sam Leavitt MD;  [facility];   Service: Gastroenterology    [place] INSERT SPINE INFUSN [address] [place] [address] N/A 1/19/2017    Procedure: REMOVAL / Lear Carry INTRATHECAL PAIN PUMP;  Surgeon: Carlos Manuel Soriano MD;  [facility];  Service: Orthopedics    [place] INSERT SPINE INFUSN [facility] N/A 5/16/2016    Procedure: REPLACEMENT AND PROGRAM PUMP ;  Surgeon: Carlos Manuel Soriano MD;  [facility];  Service: Orthopedics     Social History   History   Alcohol Use No     History   Drug Use No     History   Smoking Status    Never Smoker   Smokeless Tobacco    Never Used     Family History   Problem Relation Age of Onset    Diabetes unspecified Mother     Diabetes unspecified Brother     Diabetes unspecified Paternal Uncle     Diabetes unspecified Maternal Grandmother     Diabetes unspecified Paternal Grandmother     Diabetes unspecified Brother        Meds/Allergies       Current Outpatient Prescriptions:     ADVAIR DISKUS 500-50 MCG/DOSE inhaler    albuterol (ACCUNEB) 1 25 MG/3ML nebulizer solution    albuterol (PROVENTIL HFA,VENTOLIN HFA) 90 mcg/act inhaler    amLODIPine (NORVASC) 5 mg tablet    aspirin 81 MG tablet    atorvastatin (LIPITOR) 40 mg tablet    baclofen 10 mg tablet    Cholecalciferol (VITAMIN D3) 2000 units capsule    Cholecalciferol (VITAMIN D3) 5000 units CAPS    citalopram (CeleXA) 40 mg tablet    clopidogrel (PLAVIX) 75 mg tablet    dicyclomine (BENTYL) 20 mg tablet    fluticasone-salmeterol (ADVAIR) 250-50 mcg/dose    gabapentin (NEURONTIN) 800 mg tablet    hydrOXYzine HCL (ATARAX) 25 mg tablet    insulin aspart (NOVOLOG FLEXPEN) 100 Units/mL injection pen    insulin degludec (TRESIBA FLEXTOUCH) 200 units/mL CONCENTRATED U-200 injection pen    isosorbide mononitrate (IMDUR) 30 mg 24 hr tablet    Linaclotide 290 MCG CAPS    losartan (COZAAR) 25 mg tablet    memantine (NAMENDA) 10 mg tablet    metolazone (ZAROXOLYN) 5 mg tablet    morphine (MS CONTIN) 100 mg 12 hr tablet    nebivolol (BYSTOLIC) 10 mg tablet    nitroglycerin (NITROSTAT) 0 4 mg SL tablet    omeprazole (PriLOSEC) 20 mg delayed release capsule   omeprazole (PriLOSEC) 40 MG capsule    oxyCODONE (ROXICODONE) 30 MG immediate release tablet    polyethylene glycol (MIRALAX) 17 g packet    potassium chloride (K-DUR,KLOR-CON) 10 mEq tablet    potassium chloride (K-DUR,KLOR-CON) 20 mEq tablet    ranolazine (RANEXA) 1000 MG SR tablet    spironolactone (ALDACTONE) 25 mg tablet    tamsulosin (FLOMAX) 0 4 mg    TiZANidine (ZANAFLEX) 2 MG capsule    topiramate (TOPAMAX) 100 mg tablet    torsemide (DEMADEX) 100 mg tablet    traZODone (DESYREL) 100 mg tablet    ULTICARE SHORT PEN NEEDLES 31G X 8 MM MISC    venlafaxine (EFFEXOR-XR) 75 mg 24 hr capsule    zolpidem (AMBIEN) 10 mg tablet    No Known Allergies        Objective     Blood pressure 130/72, pulse 77, temperature 100 5 °F (38 1 °C), temperature source Tympanic, height 6' 1" (1 854 m), weight (!) 165 kg (363 lb)  Body mass index is 47 89 kg/m²  PHYSICAL EXAM:      General Appearance:   Alert, cooperative, no distress, uses a walker   HEENT:   Normocephalic, atraumatic, anicteric      Neck:  Supple, symmetrical, trachea midline   Lungs:   Clear to auscultation bilaterally; no rales, rhonchi or wheezing; respirations unlabored    Heart[de-identified]   Regular rate and rhythm; no murmur, rub, or gallop  Abdomen:   Soft, obese, generalized tenderness, non-distended; normal bowel sounds; no masses, no organomegaly    Genitalia:   Deferred    Rectal:   Deferred    Extremities:  No cyanosis, clubbing or edema    Pulses:  2+ and symmetric    Skin:  No jaundice, rashes, or lesions    Lymph nodes:  No palpable cervical lymphadenopathy        Lab Results:   No visits with results within 1 Day(s) from this visit     Latest known visit with results is:   Admission on 08/23/2018, Discharged on 08/23/2018   Component Date Value    POC Glucose 08/23/2018 183*    Case Report 08/23/2018                      Value:Surgical Pathology Report                         Case: [id number]                                   Authorizing Provider:  Edwin Wade MD           Collected:           08/23/2018 1045              Ordering Location:     [facility]        Received:            08/23/2018 [address] Endoscopy                                                          Pathologist:           Gisela Conner MD                                                        Specimens:   A) - Stomach, bx gastric antrum-r/o h pylori                                                        B) - Esophagus, bx proximal esophagus-r/o EOE                                              Final Diagnosis 08/23/2018                      Value: This result contains rich text formatting which cannot be displayed here   Additional Information 08/23/2018                      Value: This result contains rich text formatting which cannot be displayed here  Art Heidi Description 08/23/2018                      Value: This result contains rich text formatting which cannot be displayed here   Clinical Information 08/23/2018                      Value:Nodular gastritis         Radiology Results:   No results found

## 2018-10-03 NOTE — LETTER
October 3, 2018     Juana Simons MD  [id number]   [address]    Patient: Sharlee Dakins   YOB: 1960   Date of Visit: 10/3/2018       Dear Dr Pedro Luis Banda:    Thank you for referring Sharlee Dakins to me for evaluation  Below are my notes for this consultation  If you have questions, please do not hesitate to call me  I look forward to following your patient along with you  Sincerely,        Sam Leavitt MD        CC: No Recipients  Sam Leavitt MD  10/3/2018 11:45 AM  Sign at close encounter  [facility] - Outpatient Follow-up Note  Sharlee Dakins 56 y o  male MRN: [id number]  Encounter: [id number]          ASSESSMENT AND PLAN:      1  Gastroesophageal reflux disease without esophagitis  2  Esophageal dysphagia  I will schedule him for a pH study with impedance testing and manometry to evaluate the dysphagia and his persistent reflux despite proton pump inhibitor therapy  I have asked him to continue the omeprazole for now  - Espoh manometry/24hr ph; Future    3  Constipation, unspecified constipation type  He feels Linzess and Amitiza the only medicines that have really helped his symptoms  He does not want to try MiraLax again  I encouraged him to take Linzess every day and continue the Amitiza once a day but he can take Amitiza twice a day if he feels he is getting backed up  I have asked him to let me know if he develops any side effects from the medications as we usually do not use these two medications together     ______________________________________________________________________    SUBJECTIVE:  He presents for follow-up after his recent upper endoscopy  He was found to have nodular gastritis but the exam was otherwise unremarkable  He continues to have reflux and dysphagia symptoms    He describes dysphagia for solids and liquids but denies any nausea, vomiting, abdominal pain diarrhea, bleeding, or weight loss  He sometimes feels regurgitation of liquids and food  He also complains of chronic constipation which has responded to Linzess daily and Amitiza daily as he is improved for about one bowel movement per week to one bowel movement every two days  REVIEW OF SYSTEMS IS OTHERWISE NEGATIVE  Historical Information   Past Medical History:   Diagnosis Date    Acute on chronic diastolic congestive heart failure (HCC)     Altered gait     Alzheimer disease     per patients,,early onset     Angina pectoris ([facility]     Anxiety     Arthritis     Brain aneurysm     Cardiac disease     Chest pain 1/13/2016    Chronic pain     back/ right groin and rle- has morphine pump    Constipation     [facility] obstructive [facility] disease) [facility]     Coronary artery disease     CPAP (continuous positive airway pressure) dependence     Dependent on walker for ambulation     w/c for long distance    Depression     Diabetes mellitus ([facility])     insulin dependent    Dizziness     occ    Dysphagia     Enlarged prostate     GERD (gastroesophageal reflux disease)     Heart failure ([facility])     Hiatal hernia     Hypercholesterolemia     Hypertension     MI (myocardial infarction) ([facility] [address] [facility]     Migraine     Morbid obesity ([facility])     Neuropathy     Oxygen dependent     Q HS  2LPM with CPAP and prn during day 2-3 LPM     Shortness of breath     Sleep apnea     Stented coronary artery     Stroke [facility]     vision loss    Urinary frequency     Wears dentures     Wears glasses      Past Surgical History:   Procedure Laterality Date    BACK SURGERY      BRAIN SURGERY      CARDIAC CATHETERIZATION      with stents    CEREBRAL ANEURYSM REPAIR      with coils    COLONOSCOPY      ESOPHAGOGASTRODUODENOSCOPY N/A 7/1/2016    Procedure: ESOPHAGOGASTRODUODENOSCOPY (EGD); Surgeon: Benita Moran MD;  [facility];   Service:     HERNIA REPAIR      HIATAL HERNIA REPAIR      KNEE ARTHROSCOPY Right     KNEE ARTHROSCOPY Right     PERONEAL NERVE DECOMPRESSION Right     [place] ESOPHAGOGASTRODUODENOSCOPY TRANSORAL DIAGNOSTIC N/A 2/27/2017    Procedure: ESOPHAGOGASTRODUODENOSCOPY (EGD); Surgeon: Marielos Talley MD;  [facility]; Service: Gastroenterology    AZ ESOPHAGOGASTRODUODENOSCOPY TRANSORAL DIAGNOSTIC N/A 8/23/2018    Procedure: ESOPHAGOGASTRODUODENOSCOPY (EGD) with biopsy;  Surgeon: Marielos Talley MD;  [facility];   Service: Gastroenterology    AZ INSERT SPINE INFUSN [address] [place] [address] N/A 1/19/2017    Procedure: REMOVAL / EXCHANGE INTRATHECAL PAIN PUMP;  Surgeon: Elmira Zavala MD;  [facility];  Service: Orthopedics    AZ INSERT SPINE INFUSN [address] [place] [address] N/A 5/16/2016    Procedure: REPLACEMENT AND PROGRAM PUMP ;  Surgeon: Elmira Zavala MD;  [facility];  Service: Orthopedics     Social History   History   Alcohol Use No     History   Drug Use No     History   Smoking Status    Never Smoker   Smokeless Tobacco    Never Used     Family History   Problem Relation Age of Onset    Diabetes unspecified Mother     Diabetes unspecified Brother     Diabetes unspecified Paternal Uncle     Diabetes unspecified Maternal Grandmother     Diabetes unspecified Paternal Grandmother     Diabetes unspecified Brother        Meds/Allergies       Current Outpatient Prescriptions:     ADVAIR DISKUS 500-50 MCG/DOSE inhaler    albuterol (ACCUNEB) 1 25 MG/3ML nebulizer solution    albuterol (PROVENTIL HFA,VENTOLIN HFA) 90 mcg/act inhaler    amLODIPine (NORVASC) 5 mg tablet    aspirin 81 MG tablet    atorvastatin (LIPITOR) 40 mg tablet    baclofen 10 mg tablet    Cholecalciferol (VITAMIN D3) 2000 units capsule    Cholecalciferol (VITAMIN D3) 5000 units CAPS    citalopram (CeleXA) 40 mg tablet    clopidogrel (PLAVIX) 75 mg tablet    dicyclomine (BENTYL) 20 mg tablet    fluticasone-salmeterol (ADVAIR) 250-50 mcg/dose    gabapentin (NEURONTIN) 800 mg tablet    hydrOXYzine HCL (ATARAX) 25 mg tablet    insulin aspart (NOVOLOG FLEXPEN) 100 Units/mL injection pen    insulin degludec (TRESIBA FLEXTOUCH) 200 units/mL CONCENTRATED U-200 injection pen    isosorbide mononitrate (IMDUR) 30 mg 24 hr tablet    Linaclotide 290 MCG CAPS    losartan (COZAAR) 25 mg tablet    memantine (NAMENDA) 10 mg tablet    metolazone (ZAROXOLYN) 5 mg tablet    morphine (MS CONTIN) 100 mg 12 hr tablet    nebivolol (BYSTOLIC) 10 mg tablet    nitroglycerin (NITROSTAT) 0 4 mg SL tablet    omeprazole (PriLOSEC) 20 mg delayed release capsule    omeprazole (PriLOSEC) 40 MG capsule    oxyCODONE (ROXICODONE) 30 MG immediate release tablet    polyethylene glycol (MIRALAX) 17 g packet    potassium chloride (K-DUR,KLOR-CON) 10 mEq tablet    potassium chloride (K-DUR,KLOR-CON) 20 mEq tablet    ranolazine (RANEXA) 1000 MG SR tablet    spironolactone (ALDACTONE) 25 mg tablet    tamsulosin (FLOMAX) 0 4 mg    TiZANidine (ZANAFLEX) 2 MG capsule    topiramate (TOPAMAX) 100 mg tablet    torsemide (DEMADEX) 100 mg tablet    traZODone (DESYREL) 100 mg tablet    ULTICARE SHORT PEN NEEDLES 31G X 8 MM MISC    venlafaxine (EFFEXOR-XR) 75 mg 24 hr capsule    zolpidem (AMBIEN) 10 mg tablet    No Known Allergies        Objective     Blood pressure 130/72, pulse 77, temperature 100 5 °F (38 1 °C), temperature source Tympanic, height 6' 1" (1 854 m), weight (!) 165 kg (363 lb)  Body mass index is 47 89 kg/m²  PHYSICAL EXAM:      General Appearance:   Alert, cooperative, no distress, uses a walker   HEENT:   Normocephalic, atraumatic, anicteric      Neck:  Supple, symmetrical, trachea midline   Lungs:   Clear to auscultation bilaterally; no rales, rhonchi or wheezing; respirations unlabored    Heart[de-identified]   Regular rate and rhythm; no murmur, rub, or gallop     Abdomen:   Soft, obese, generalized tenderness, non-distended; normal bowel sounds; no masses, no organomegaly    Genitalia:   Deferred    Rectal:   Deferred    Extremities:  No cyanosis, clubbing or edema    Pulses:  2+ and symmetric    Skin:  No jaundice, rashes, or lesions    Lymph nodes:  No palpable cervical lymphadenopathy        Lab Results:   No visits with results within 1 Day(s) from this visit  Latest known visit with results is:   Admission on 08/23/2018, Discharged on 08/23/2018   Component Date Value    POC Glucose 08/23/2018 183*    Case Report 08/23/2018                      Value:Surgical Pathology Report                         Case: [id number]                                   Authorizing Provider:  Caitie Garrett MD           Collected:           08/23/2018 1045              Ordering Location:     [facility]        Received:            08/23/2018 [facility]                                                          Pathologist:           Anni Beverly MD                                                        Specimens:   A) - Stomach, bx gastric antrum-r/o h pylori                                                        B) - Esophagus, bx proximal esophagus-r/o EOE                                              Final Diagnosis 08/23/2018                      Value: This result contains rich text formatting which cannot be displayed here   Additional Information 08/23/2018                      Value: This result contains rich text formatting which cannot be displayed here  Beverly Habermann Description 08/23/2018                      Value: This result contains rich text formatting which cannot be displayed here   Clinical Information 08/23/2018                      Value:Nodular gastritis         Radiology Results:   No results found

## 2018-10-03 NOTE — TELEPHONE ENCOUNTER
received a fax from pharm requesting refill of vit d3 2000 units, 2 caps daily  last script was written for vit d3 5000unit 1 cap daily    please refill as appropriate andsend to [facility] pharm

## 2018-10-08 DIAGNOSIS — N18.30 TYPE 2 DIABETES MELLITUS WITH STAGE 3 CHRONIC KIDNEY DISEASE, WITH LONG-TERM CURRENT USE OF INSULIN (HCC): ICD-10-CM

## 2018-10-08 DIAGNOSIS — Z79.4 TYPE 2 DIABETES MELLITUS WITH STAGE 3 CHRONIC KIDNEY DISEASE, WITH LONG-TERM CURRENT USE OF INSULIN (HCC): ICD-10-CM

## 2018-10-08 DIAGNOSIS — R13.19 ESOPHAGEAL DYSPHAGIA: ICD-10-CM

## 2018-10-08 DIAGNOSIS — E11.22 TYPE 2 DIABETES MELLITUS WITH STAGE 3 CHRONIC KIDNEY DISEASE, WITH LONG-TERM CURRENT USE OF INSULIN (HCC): ICD-10-CM

## 2018-10-08 RX ORDER — AMLODIPINE BESYLATE 5 MG/1
5 TABLET ORAL DAILY
Qty: 30 TABLET | Refills: 1 | Status: SHIPPED | OUTPATIENT
Start: 2018-10-08 | End: 2019-06-04 | Stop reason: ALTCHOICE

## 2018-10-08 RX ORDER — INSULIN ASPART 100 [IU]/ML
INJECTION, SOLUTION INTRAVENOUS; SUBCUTANEOUS
Qty: 30 ML | Refills: 1 | Status: SHIPPED | OUTPATIENT
Start: 2018-10-08 | End: 2018-10-19

## 2018-10-09 RX ORDER — MAG HYDROX/ALUMINUM HYD/SIMETH 400-400-40
5000 SUSPENSION, ORAL (FINAL DOSE FORM) ORAL DAILY
Qty: 30 CAPSULE | Refills: 6 | Status: SHIPPED | OUTPATIENT
Start: 2018-10-09

## 2018-10-19 ENCOUNTER — TELEPHONE (OUTPATIENT)
Dept: ENDOCRINOLOGY | Facility: CLINIC | Age: 58
End: 2018-10-19

## 2018-10-19 DIAGNOSIS — N18.30 TYPE 2 DIABETES MELLITUS WITH STAGE 3 CHRONIC KIDNEY DISEASE, WITH LONG-TERM CURRENT USE OF INSULIN (HCC): ICD-10-CM

## 2018-10-19 DIAGNOSIS — E11.22 TYPE 2 DIABETES MELLITUS WITH STAGE 3 CHRONIC KIDNEY DISEASE, WITH LONG-TERM CURRENT USE OF INSULIN (HCC): ICD-10-CM

## 2018-10-19 DIAGNOSIS — Z79.4 TYPE 2 DIABETES MELLITUS WITH STAGE 3 CHRONIC KIDNEY DISEASE, WITH LONG-TERM CURRENT USE OF INSULIN (HCC): ICD-10-CM

## 2018-10-19 NOTE — TELEPHONE ENCOUNTER
Spoke with patient and he will increase Novolog 44 units before meals and continue same dose of Tresiba 136 units daily  His Gastric bypass surgery is schedule for November 19

## 2018-10-23 ENCOUNTER — TELEPHONE (OUTPATIENT)
Dept: GASTROENTEROLOGY | Facility: AMBULARY SURGERY CENTER | Age: 58
End: 2018-10-23

## 2018-10-23 NOTE — TELEPHONE ENCOUNTER
Do you feel comfortable with me refilling this pt's amitiza? It appears that he was recommended to stop this when he started the [address] [place] [address] Street but continued both  I have not prescribed these medications together before and I am not sure that I should refill the Amitiza  Thanks for your help

## 2018-10-23 NOTE — TELEPHONE ENCOUNTER
Christopher Merrill,    I also usually don't give both of them together, but in his case this seems to be the only combination working so I'm ok with you refilling it  I have discussed the side effects with him and that these two medications have not been studies together  Here is the impression/plan from my last note:    3  Constipation, unspecified constipation type  He feels Linzess and Amitiza the only medicines that have really helped his symptoms  He does not want to try MiraLax again  I encouraged him to take Linzess every day and continue the Amitiza once a day but he can take Amitiza twice a day if he feels he is getting backed up  I have asked him to let me know if he develops any side effects from the medications as we usually do not use these two medications together        Thanks,    Adeline Tirado

## 2018-10-24 DIAGNOSIS — K59.04 CHRONIC IDIOPATHIC CONSTIPATION: Primary | ICD-10-CM

## 2018-10-26 DIAGNOSIS — G43.709 CHRONIC MIGRAINE WITHOUT AURA WITHOUT STATUS MIGRAINOSUS, NOT INTRACTABLE: Primary | ICD-10-CM

## 2018-10-26 RX ORDER — TOPIRAMATE 100 MG/1
100 TABLET, FILM COATED ORAL 2 TIMES DAILY
Qty: 60 TABLET | Refills: 5 | Status: SHIPPED | OUTPATIENT
Start: 2018-10-26 | End: 2019-05-15 | Stop reason: SDUPTHER

## 2018-10-26 NOTE — TELEPHONE ENCOUNTER
Received fax from [address] [place] [address] pharmacy for topamax 100mg in am and 2 at bedtime  I called pt bc last time it was prescribed was 10/2017 with 6 refills, and last office note did not mention it  He confirms he is still taking it 100mg BID

## 2018-11-03 DIAGNOSIS — K21.9 GASTROESOPHAGEAL REFLUX DISEASE, ESOPHAGITIS PRESENCE NOT SPECIFIED: Primary | ICD-10-CM

## 2018-11-05 RX ORDER — OMEPRAZOLE 40 MG/1
CAPSULE, DELAYED RELEASE ORAL
Qty: 90 CAPSULE | Refills: 0 | Status: SHIPPED | OUTPATIENT
Start: 2018-11-05 | End: 2018-11-07 | Stop reason: SDUPTHER

## 2018-11-07 ENCOUNTER — OFFICE VISIT (OUTPATIENT)
Dept: ENDOCRINOLOGY | Facility: CLINIC | Age: 58
End: 2018-11-07
Payer: MEDICARE

## 2018-11-07 VITALS
SYSTOLIC BLOOD PRESSURE: 126 MMHG | HEIGHT: 73 IN | DIASTOLIC BLOOD PRESSURE: 70 MMHG | BODY MASS INDEX: 41.75 KG/M2 | WEIGHT: 315 LBS | HEART RATE: 72 BPM

## 2018-11-07 DIAGNOSIS — Z79.4 TYPE 2 DIABETES MELLITUS WITH HYPERGLYCEMIA, WITH LONG-TERM CURRENT USE OF INSULIN (HCC): ICD-10-CM

## 2018-11-07 DIAGNOSIS — E55.9 VITAMIN D DEFICIENCY: ICD-10-CM

## 2018-11-07 DIAGNOSIS — E78.5 HYPERLIPIDEMIA, UNSPECIFIED HYPERLIPIDEMIA TYPE: ICD-10-CM

## 2018-11-07 DIAGNOSIS — I10 HYPERTENSION, UNSPECIFIED TYPE: ICD-10-CM

## 2018-11-07 DIAGNOSIS — Z79.4 INSULIN LONG-TERM USE (HCC): Primary | ICD-10-CM

## 2018-11-07 DIAGNOSIS — E11.65 TYPE 2 DIABETES MELLITUS WITH HYPERGLYCEMIA, WITH LONG-TERM CURRENT USE OF INSULIN (HCC): ICD-10-CM

## 2018-11-07 PROCEDURE — 99215 OFFICE O/P EST HI 40 MIN: CPT | Performed by: PHYSICIAN ASSISTANT

## 2018-11-07 NOTE — LETTER
November 7, 2018     Nicolas Palmer MD  [id number]   [address] [facility]    Patient: Levar Bill   YOB: 1960   Date of Visit: 11/7/2018       Dear Dr Taylor Al:    Thank you for referring Levar Bill to me for evaluation  Below are my notes for this consultation  If you have questions, please do not hesitate to call me  I look forward to following your patient along with you  Sincerely,        Nemesio Archer PA-C        CC: No Recipients  Nemesio Archer PA-C  11/7/2018  2:41 PM  Sign at close encounter      Established Patient Progress Note      Chief Complaint   Patient presents with    Diabetes Type 2          History of Present Illness:   Levar Bill is a 56 y o  male with a history of type 2 diabetes with long term use of insulin since 2016  Reports complications of CKD and neuropathy  Denies recent illness or hospitalizations  Denies recent severe hypoglycemic or severe hyperglycemic episodes  Denies any issues with his current regimen  home glucose monitoring: are performed regularly 4x per day  Blood sugars getting better over past few days, on protein shakes plans for gastric sleeve 11/[facility]  Home blood glucose readings:   Before breakfast: 133-302  Before lunch: 145-266  Before dinner: 146-312  Bedtime: 160-344    Current Diabetes Regimen:   Novolog 44 units before meals  Tresiba 136 units daily    Last Eye Exam: 5/2018  Last Foot Exam: UTD Dr Sedrick Zapata, on gabapentin for neuropathy    Has hypertension: Taking Amlodipine  Has hyperlipidemia: Taking atorvastatin  Has CAD, follows with cardiology    CKD: Follows with nephrology Dr Bandar Mcginnis  CAD: Follows with [facility]  Vit D Deficiency: taking supplements       Patient Active Problem List   Diagnosis    Hypertension    Type 2 diabetes mellitus with renal complication (HCC)    Chronic diastolic congestive heart failure ([address] )    Coronary artery disease involving native coronary artery    Morbid obesity ([facility] )    CV[facility]re[facility] vascular accident[facility]    CHF (congestive heart failure) ([facility]    Stroke ([facility] )    Stented coronary artery    Obstructive sleep apnea syndrome    MI (myocardial infarction) ([facility] )    Depression    CPAP (continuous positive airway pressure) dependence    Brain aneurysm    Hypokalemia    Alzheimer disease    Acute on chronic diastolic congestive heart failure ([facility]    Chronic pain disorder    Constipation    Coronary artery disease    Type 2 diabetes mellitus with hyperglycemia, with long-term current use of insulin ([facility])    Sleep apnea    Acute tracheobronchitis    Dyspnea on exertion    Bilateral leg edema    Chronic respiratory failure ([facility]    Chest pain    Stage 3 chronic kidney disease ([facility]    Leukocytosis    GERD (gastroesophageal reflux disease)    Acute renal failure superimposed on stage 3 chronic kidney disease ([facility]    Opioid dependence ([facility]    Esophageal dysphagia    Chronic migraine without aura without status migrainosus, not intractable    Hyperlipidemia    Vitamin D deficiency      Past Medical History:   Diagnosis Date    Acute on chronic diastolic congestive heart failure ([facility])     Altered gait     Alzheimer disease     per patients,,early onset     Angina pectoris ([facility] )     Anxiety     Arthritis     Brain aneurysm     Cardiac disease     Chest pain 1/13/2016    Chronic pain     back/ right groin and rle- has morphine pump    Constipation     COPD (chronic obstructive pulmonary disease) (HCC)     Coronary artery disease     CPAP (continuous positive airway pressure) dependence     Dependent on walker for ambulation     w/c for long distance    Depression     Diabetes mellitus ([facility]     insulin dependent    Dizziness     occ    Dysphagia     Enlarged prostate     GERD (gastroesophageal reflux disease)     Heart failure ([facility]     Hiatal hernia     Hypercholesterolemia     Hypertension     MI (myocardial infarction) [facility]     Migraine     Morbid obesity (HCC)     Neuropathy     Oxygen dependent     Q HS  2LPM with CPAP and prn during day 2-3 LPM     Shortness of breath     Sleep apnea     Stented coronary artery     Stroke ([facility]     vision loss    Urinary frequency     Wears dentures     Wears glasses       Past Surgical History:   Procedure Laterality Date    BACK SURGERY      BRAIN SURGERY      CARDIAC CATHETERIZATION      with stents    CEREBRAL ANEURYSM REPAIR      with coils    COLONOSCOPY      ESOPHAGOGASTRODUODENOSCOPY N/A 7/1/2016    Procedure: ESOPHAGOGASTRODUODENOSCOPY (EGD); Surgeon: Nils Moritz, MD;  [facility]: [facility]; Service:     HERNIA REPAIR      HIATAL HERNIA REPAIR      KNEE ARTHROSCOPY Right     KNEE ARTHROSCOPY Right     PERONEAL NERVE DECOMPRESSION Right     [place] ESOPHAGOGASTRODUODENOSCOPY TRANSORAL DIAGNOSTIC N/A 2/27/2017    Procedure: ESOPHAGOGASTRODUODENOSCOPY (EGD); Surgeon: Nils Moritz, MD;  [facility]: [facility]; Service: Gastroenterology    [place] ESOPHAGOGASTRODUODENOSCOPY TRANSORAL DIAGNOSTIC N/A 8/23/2018    Procedure: ESOPHAGOGASTRODUODENOSCOPY (EGD) with biopsy;  Surgeon: Nils Moritz, MD;  [facility]: [facility];   Service: Gastroenterology    [place] INSERT SPINE INFUSN [facility] N/A 1/19/2017    Procedure: REMOVAL / Zigmund Carp;  Surgeon: Kristine Holguin MD;  [facility];  Service: Orthopedics    [place] INSERT SPINE INFUSN [facility] N/A 5/16/2016    Procedure: REPLACEMENT AND PROGRAM PUMP ;  Surgeon: Kristine Holguin MD;  [facility];  Service: Orthopedics      Family History   Problem Relation Age of Onset    Diabetes unspecified Mother     Diabetes unspecified Brother     Diabetes unspecified Paternal Uncle     Diabetes unspecified Maternal Grandmother     Diabetes unspecified Paternal Grandmother     Diabetes unspecified Brother      Social History   Substance Use Topics    Smoking status: Never Smoker    Smokeless tobacco: Never Used    Alcohol use No     No Known Allergies      Current Outpatient Prescriptions:     ADVAIR DISKUS 500-50 MCG/DOSE inhaler, Inhale 1 puff 2 (two) times a day, Disp: , Rfl: 1    albuterol (ACCUNEB) 1 25 MG/3ML nebulizer solution, Take 1 ampule by nebulization every 6 (six) hours as needed for wheezing, Disp: , Rfl:     albuterol (PROVENTIL HFA,VENTOLIN HFA) 90 mcg/act inhaler, Inhale 2 puffs every 6 (six) hours as needed for wheezing, Disp: , Rfl:     amLODIPine (NORVASC) 5 mg tablet, Take 1 tablet (5 mg total) by mouth daily, Disp: 30 tablet, Rfl: 1    aspirin 81 MG tablet, Take 81 mg by mouth daily  , Disp: , Rfl:     atorvastatin (LIPITOR) 40 mg tablet, Take 40 mg by mouth daily  , Disp: , Rfl:     baclofen 10 mg tablet, Take 10 mg by mouth 3 (three) times a day, Disp: , Rfl:     Cholecalciferol (VITAMIN D3) 5000 units CAPS, Take 1 capsule (5,000 Units total) by mouth daily, Disp: 30 capsule, Rfl: 6    citalopram (CeleXA) 40 mg tablet, TAKE 0 5 TAB (20MG TOTAL) BY MOUTH DAILY, Disp: , Rfl:     clopidogrel (PLAVIX) 75 mg tablet, Take 75 mg by mouth daily  , Disp: , Rfl:     dicyclomine (BENTYL) 20 mg tablet, Take 10 mg by mouth every 6 (six) hours as needed, Disp: , Rfl:     fluticasone-salmeterol (ADVAIR) 250-50 mcg/dose, Inhale 1 puff 2 (two) times a day , Disp: , Rfl:     gabapentin (NEURONTIN) 800 mg tablet, Take 800 mg by mouth 2 (two) times a day, Disp: , Rfl:     hydrOXYzine HCL (ATARAX) 25 mg tablet, Take 1-2 tablets by mouth every 6 hours as needed for anxiety  , Disp: , Rfl:     insulin aspart (NOVOLOG FLEXPEN) 100 Units/mL injection pen, Inject 44 Units under the skin 3 (three) times a day with meals for 90 days, Disp: 40 pen, Rfl: 1    insulin degludec (TRESIBA FLEXTOUCH) 200 units/mL CONCENTRATED U-200 injection pen, Inject 120 Units under the skin daily at bedtime (Patient taking differently: Inject 136 Units under the skin daily at bedtime  ), Disp: 6 pen, Rfl: 2    isosorbide mononitrate (IMDUR) 30 mg 24 hr tablet, Take 1 tablet by mouth daily, Disp: 30 tablet, Rfl: 0    Linaclotide 290 MCG CAPS, Take 1 capsule by mouth daily, Disp: 30 capsule, Rfl: 3    lubiprostone (AMITIZA) 24 mcg capsule, Take 1 capsule (24 mcg total) by mouth 2 (two) times a day with meals, Disp: 60 capsule, Rfl: 3    memantine (NAMENDA) 10 mg tablet, Take 10 mg by mouth daily, Disp: , Rfl:     metolazone (ZAROXOLYN) 5 mg tablet, 5 mg 2 (two) times a week As directed, Disp: , Rfl: 3    morphine (MS CONTIN) 100 mg 12 hr tablet, Take 100 mg by mouth 2 (two) times a day  , Disp: , Rfl:     nebivolol (BYSTOLIC) 10 mg tablet, Take 10 mg by mouth daily  , Disp: , Rfl:     nitroglycerin (NITROSTAT) 0 4 mg SL tablet, Place 0 4 mg under the tongue every 5 (five) minutes as needed for chest pain (pt has not  used recently)    , Disp: , Rfl:     omeprazole (PriLOSEC) 20 mg delayed release capsule, Take 1 capsule (20 mg total) by mouth daily, Disp: 30 capsule, Rfl: 2    oxyCODONE (ROXICODONE) 30 MG immediate release tablet, Take 30 mg by mouth every 4 (four) hours as needed for moderate pain  , Disp: , Rfl:     potassium chloride (K-DUR,KLOR-CON) 20 mEq tablet, TAKE 2 TABS BY MOUTH THREE TIMESA DAY, Disp: , Rfl: 3    ranolazine (RANEXA) 1000 MG SR tablet, Take 1,000 mg by mouth 2 (two) times a day , Disp: , Rfl:     spironolactone (ALDACTONE) 50 mg tablet, Take 50 mg by mouth daily  , Disp: , Rfl: 5    tamsulosin (FLOMAX) 0 4 mg, Take 0 4 mg by mouth daily with dinner , Disp: , Rfl:     TiZANidine (ZANAFLEX) 2 MG capsule, Take 2 mg by mouth 3 (three) times a day , Disp: , Rfl:     topiramate (TOPAMAX) 100 mg tablet, Take 1 tablet (100 mg total) by mouth 2 (two) times a day, Disp: 60 tablet, Rfl: 5    torsemide (DEMADEX) 100 mg tablet, Take 0 5 tablets by mouth 2 (two) times a day, Disp: 60 tablet, Rfl: 0    traZODone (DESYREL) 100 mg tablet, Take 100 mg by mouth daily at bedtime  , Disp: , Rfl:     ULTICARE SHORT PEN NEEDLES 31G X 8 MM MISC, 4 INJECTIONS PER DAY DX  E11 8, Disp: , Rfl: 1    venlafaxine (EFFEXOR-XR) 75 mg 24 hr capsule, Take 75 mg by mouth, Disp: , Rfl:     zolpidem (AMBIEN) 10 mg tablet, Take 10 mg by mouth daily at bedtime as needed for sleep, Disp: , Rfl:     Review of Systems   Constitutional: Negative for activity change, appetite change and fatigue  HENT: Negative for sore throat, trouble swallowing and voice change  Eyes: Negative for visual disturbance  Respiratory: Negative for choking, chest tightness and shortness of breath  Cardiovascular: Negative for chest pain, palpitations and leg swelling  Gastrointestinal: Negative for abdominal pain, constipation and diarrhea  Endocrine: Negative for cold intolerance, heat intolerance, polydipsia, polyphagia and polyuria  Genitourinary: Negative for frequency  Musculoskeletal: Negative for arthralgias and myalgias  Skin: Negative for rash  Neurological: Negative for dizziness and syncope  Hematological: Negative for adenopathy  Psychiatric/Behavioral: Negative for sleep disturbance  All other systems reviewed and are negative  Physical Exam:  Body mass index is 48 89 kg/m²  /70   Pulse 72   Ht 6' 1" (1 854 m)   Wt (!) 168 kg (370 lb 9 6 oz)   BMI 48 89 kg/m²     Wt Readings from Last 3 Encounters:   11/07/18 (!) 168 kg (370 lb 9 6 oz)   10/03/18 (!) 165 kg (363 lb)   09/12/18 (!) 172 kg (379 lb)       Physical Exam   Constitutional: He is oriented to person, place, and time  He appears well-developed and well-nourished  No distress  HENT:   Head: Normocephalic and atraumatic  Mouth/Throat: Oropharynx is clear and moist    Eyes: Pupils are equal, round, and reactive to light  Conjunctivae and EOM are normal    Neck: Normal range of motion  Neck supple  No thyromegaly present  Cardiovascular: Normal rate, regular rhythm and normal heart sounds      No murmur heard  Pulmonary/Chest: Effort normal  No respiratory distress  He has wheezes  He has no rales  Abdominal: Soft  Bowel sounds are normal  He exhibits no distension  There is no tenderness  Musculoskeletal: Normal range of motion  He exhibits no edema  Lymphadenopathy:     He has no cervical adenopathy  Neurological: He is alert and oriented to person, place, and time  Skin: Skin is warm and dry  Psychiatric: He has a normal mood and affect  Vitals reviewed  Diabetic Foot Exam    Labs:   10/29/2018 GFR 49, Cr 1 57   10/15/18 Vit D 27  10/29/2018 TSH 4 71,  1/22/18 LDL 60 Trig 263    Lab Results   Component Value Date    HGBA1C 7 6 08/15/2018     No components found for: GLU  Lab Results   Component Value Date    HGBA1C 7 6 08/15/2018       Lab Results   Component Value Date    CREATININE 1 72 (H) 11/22/2017    CREATININE 1 99 (H) 11/15/2017    CREATININE 1 71 (H) 11/07/2017    BUN 28 (H) 11/22/2017     (L) 10/21/2015    K 3 2 (L) 11/22/2017     11/22/2017    CO2 31 11/22/2017     eGFR   Date Value Ref Range Status   11/22/2017 44 ml/min/1 73sq m Final       Lab Results   Component Value Date    CHOL 119 01/06/2015    HDL 27 (L) 11/04/2017    TRIG 177 (H) 11/04/2017       Lab Results   Component Value Date    ALT 30 10/12/2017    AST 16 10/12/2017    ALKPHOS 117 (H) 10/12/2017    BILITOT 0 81 10/19/2015       Lab Results   Component Value Date    QBP8YRONZECI 2 340 10/11/2017    TZK4WDBZPJMG 3 604 03/08/2017    PLS6MFBUHPYS 1 903 08/12/2016     Lab Results   Component Value Date    FREET4 0 95 10/11/2017       Impression & Plan:    Problem List Items Addressed This Visit     Hypertension     Well controlled on current regimen            Type 2 diabetes mellitus with hyperglycemia, with long-term current use of insulin (HCC)     Poorly Controlled based on review of glucose log but improving over past few days since he has switched to protein shakes in prep for gastric sleeve surgery planned 11/19  For now, continue current regimen  IF he begins to have hypoglycemia advised him to reduce Novolog to half (22 units) before protein shakes  Reduce tresiba to 100 units for the two nights prior to surgery  Advised him to send BG readings pre/post op as needed for frequent insulin adjustements and change in insulin requirements  Due to being on intensive insulin therapy,  he at high risk of severe hypoglycemia  Severe hypoglycemia can result in falls, injury, coma, seizure, or death  Check A1C in 3 months before next visit  Relevant Medications    insulin aspart (NOVOLOG FLEXPEN) 100 Units/mL injection pen    Other Relevant Orders    Hemoglobin A1C    Comprehensive metabolic panel    Lipid Panel with Direct LDL reflex    Hyperlipidemia     Continue atorvastatin         Vitamin D deficiency     Continue supplements  Other Visit Diagnoses     Insulin long-term use ([facility] [address] )    -  Primary          Orders Placed This Encounter   Procedures    Hemoglobin A1C     Standing Status:   Future     Standing Expiration Date:   11/7/2019    Comprehensive metabolic panel     This is a patient instruction: Patient fasting for 8 hours or longer recommended  Standing Status:   Future     Standing Expiration Date:   11/7/2019    Lipid Panel with Direct LDL reflex     This is a patient instruction: This test requires patient fasting for 10-12 hours or longer  Drinking of black coffee or black tea is acceptable  Standing Status:   Future     Standing Expiration Date:   11/7/2019       Patient Instructions   If you have any problems with low blood sugars after being on protein shakes, Reduce Novolog to 22 units before protein shakes  Two nights before surgery, Reduce to Tresiba to 100 units  If any problems with low/high sugars before or after surgery, let us know and we will give you insulin instructions  Send BG log every 2 weeks or sooner if problems    Do lab testing in 3 months        Discussed with the patient and all questioned fully answered  He will call me if any problems arise  Follow-up appointment in 3 months       Counseled patient on diagnostic results, prognosis, risk and benefit of treatment options, instruction for management, importance of treatment compliance, Risk  factor reduction and impressions      Lisbeth Dey PA-C

## 2018-11-07 NOTE — PROGRESS NOTES
Established Patient Progress Note      Chief Complaint   Patient presents with    Diabetes Type 2          History of Present Illness:   Elba Matthews is a 56 y o  male with a history of type 2 diabetes with long term use of insulin since 2016  Reports complications of CKD and neuropathy  Denies recent illness or hospitalizations  Denies recent severe hypoglycemic or severe hyperglycemic episodes  Denies any issues with his current regimen  home glucose monitoring: are performed regularly 4x per day  Blood sugars getting better over past few days, on protein shakes plans for gastric sleeve 11/18 at [facility]  Home blood glucose readings:   Before breakfast: 133-302  Before lunch: 145-266  Before dinner: 146-312  Bedtime: 160-344    Current Diabetes Regimen:   Novolog 44 units before meals  Tresiba 136 units daily    Last Eye Exam: 5/2018  Last Foot Exam: UTD Dr Gianna Chatterjee, on gabapentin for neuropathy    Has hypertension: Taking Amlodipine  Has hyperlipidemia: Taking atorvastatin  Has CAD, follows with cardiology    CKD: Follows with nephrology Dr Melo Raza  CAD: Follows with [facility]  Vit D Deficiency: taking supplements       Patient Active Problem List   Diagnosis    Hypertension    Type 2 diabetes mellitus with renal complication (HCC)    Chronic diastolic congestive heart failure ([address] )    Coronary artery disease involving native coronary artery    Morbid obesity [facility]    CVA (cerebral vascular accident) ([address] )    CHF (congestive heart failure) ([address] )    Stroke ([facility]    Stented coronary artery    Obstructive sleep apnea syndrome    MI (myocardial infarction) ([address] )    Depression    CPAP (continuous positive airway pressure) dependence    Brain aneurysm    Hypokalemia    Alzheimer disease    Acute on chronic diastolic congestive heart failure (HCC)    Chronic pain disorder    Constipation    Coronary artery disease    Type 2 diabetes mellitus with hyperglycemia, with long-term current use of insulin (HCC)    Sleep apnea    Acute tracheobronchitis    Dyspnea on exertion    Bilateral leg edema    Chronic respiratory failure (HCC)    Chest pain    Stage 3 chronic kidney disease (HCC)    Leukocytosis    GERD (gastroesophageal reflux disease)    Acute renal failure superimposed on stage 3 chronic kidney disease (HCC)    Opioid dependence (HCC)    Esophageal dysphagia    Chronic migraine without aura without status migrainosus, not intractable    Hyperlipidemia    Vitamin D deficiency      Past Medical History:   Diagnosis Date    Acute on chronic diastolic congestive heart failure (HCC)     Altered gait     Alzheimer disease     per patients,,early onset     Angina pectoris ([address] )     Anxiety     Arthritis     Brain aneurysm     Cardiac disease     Chest pain 1/13/2016    Chronic pain     back/ right groin and rle- has morphine pump    Constipation     COPD (chronic obstructive pulmonary disease) (HCC)     Coronary artery disease     CPAP (continuous positive airway pressure) dependence     Dependent on walker for ambulation     w/c for long distance    Depression     Diabetes mellitus (HCC)     insulin dependent    Dizziness     occ    Dysphagia     Enlarged prostate     GERD (gastroesophageal reflux disease)     Heart failure (HCC)     Hiatal hernia     Hypercholesterolemia     Hypertension     MI (myocardial infarction) ([address] )     Migraine     Morbid obesity (HCC)     Neuropathy     Oxygen dependent     Q HS  2LPM with CPAP and prn during day 2-3 LPM     Shortness of breath     Sleep apnea     Stented coronary artery     Stroke ([address] )     vision loss    Urinary frequency     Wears dentures     Wears glasses       Past Surgical History:   Procedure Laterality Date    BACK SURGERY      BRAIN SURGERY      CARDIAC CATHETERIZATION      with stents    CEREBRAL ANEURYSM REPAIR      with coils    COLONOSCOPY      ESOPHAGOGASTRODUODENOSCOPY N/A 7/1/2016    Procedure: ESOPHAGOGASTRODUODENOSCOPY (EGD); Surgeon: Violet Aguirre MD;  [facility]; Service:     HERNIA REPAIR      HIATAL HERNIA REPAIR      KNEE ARTHROSCOPY Right     KNEE ARTHROSCOPY Right     PERONEAL NERVE DECOMPRESSION Right     W[place] ESOPHAGOGASTRODUODENOSCOPY TRANSORAL DIAGNOSTIC N/A 2/27/2017    Procedure: ESOPHAGOGASTRODUODENOSCOPY (EGD); Surgeon: Violet Aguirre MD;  [facility]; Service: Gastroenterology    [place] ESOPHAGOGASTRODUODENOSCOPY TRANSORAL DIAGNOSTIC N/A 8/23/2018    Procedure: ESOPHAGOGASTRODUODENOSCOPY (EGD) with biopsy;  Surgeon: Violet Aguirre MD;  [facility];   Service: Gastroenterology    [place] INSERT SPINE INFUSN [address] [place] [address] N/A 1/19/2017    Procedure: REMOVAL / EXCHANGE INTRATHECAL PAIN PUMP;  Surgeon: Anh Grover MD;  [facility];  Service: Orthopedics    [place] INSERT SPINE INFUSN [address] [place] [address] N/A 5/16/2016    Procedure: REPLACEMENT AND PROGRAM PUMP ;  Surgeon: Anh Grover MD;  [facility];  Service: Orthopedics      Family History   Problem Relation Age of Onset    Diabetes unspecified Mother     Diabetes unspecified Brother     Diabetes unspecified Paternal Uncle     Diabetes unspecified Maternal Grandmother     Diabetes unspecified Paternal Grandmother     Diabetes unspecified Brother      Social History   Substance Use Topics    Smoking status: Never Smoker    Smokeless tobacco: Never Used    Alcohol use No     No Known Allergies      Current Outpatient Prescriptions:     ADVAIR DISKUS 500-50 MCG/DOSE inhaler, Inhale 1 puff 2 (two) times a day, Disp: , Rfl: 1    albuterol (ACCUNEB) 1 25 MG/3ML nebulizer solution, Take 1 ampule by nebulization every 6 (six) hours as needed for wheezing, Disp: , Rfl:     albuterol (PROVENTIL HFA,VENTOLIN HFA) 90 mcg/act inhaler, Inhale 2 puffs every 6 (six) hours as needed for wheezing, Disp: , Rfl:     amLODIPine (NORVASC) 5 mg tablet, Take 1 tablet (5 mg total) by mouth daily, Disp: 30 tablet, Rfl: 1    aspirin 81 MG tablet, Take 81 mg by mouth daily  , Disp: , Rfl:     atorvastatin (LIPITOR) 40 mg tablet, Take 40 mg by mouth daily  , Disp: , Rfl:     baclofen 10 mg tablet, Take 10 mg by mouth 3 (three) times a day, Disp: , Rfl:     Cholecalciferol (VITAMIN D3) 5000 units CAPS, Take 1 capsule (5,000 Units total) by mouth daily, Disp: 30 capsule, Rfl: 6    citalopram (CeleXA) 40 mg tablet, TAKE 0 5 TAB (20MG TOTAL) BY MOUTH DAILY, Disp: , Rfl:     clopidogrel (PLAVIX) 75 mg tablet, Take 75 mg by mouth daily  , Disp: , Rfl:     dicyclomine (BENTYL) 20 mg tablet, Take 10 mg by mouth every 6 (six) hours as needed, Disp: , Rfl:     fluticasone-salmeterol (ADVAIR) 250-50 mcg/dose, Inhale 1 puff 2 (two) times a day , Disp: , Rfl:     gabapentin (NEURONTIN) 800 mg tablet, Take 800 mg by mouth 2 (two) times a day, Disp: , Rfl:     hydrOXYzine HCL (ATARAX) 25 mg tablet, Take 1-2 tablets by mouth every 6 hours as needed for anxiety  , Disp: , Rfl:     insulin aspart (NOVOLOG FLEXPEN) 100 Units/mL injection pen, Inject 44 Units under the skin 3 (three) times a day with meals for 90 days, Disp: 40 pen, Rfl: 1    insulin degludec (TRESIBA FLEXTOUCH) 200 units/mL CONCENTRATED U-200 injection pen, Inject 120 Units under the skin daily at bedtime (Patient taking differently: Inject 136 Units under the skin daily at bedtime  ), Disp: 6 pen, Rfl: 2    isosorbide mononitrate (IMDUR) 30 mg 24 hr tablet, Take 1 tablet by mouth daily, Disp: 30 tablet, Rfl: 0    Linaclotide 290 MCG CAPS, Take 1 capsule by mouth daily, Disp: 30 capsule, Rfl: 3    lubiprostone (AMITIZA) 24 mcg capsule, Take 1 capsule (24 mcg total) by mouth 2 (two) times a day with meals, Disp: 60 capsule, Rfl: 3    memantine (NAMENDA) 10 mg tablet, Take 10 mg by mouth daily, Disp: , Rfl:     metolazone (ZAROXOLYN) 5 mg tablet, 5 mg 2 (two) times a week As directed, Disp: , Rfl: 3    morphine (MS CONTIN) 100 mg 12 hr tablet, Take 100 mg by mouth 2 (two) times a day  , Disp: , Rfl:     nebivolol (BYSTOLIC) 10 mg tablet, Take 10 mg by mouth daily  , Disp: , Rfl:     nitroglycerin (NITROSTAT) 0 4 mg SL tablet, Place 0 4 mg under the tongue every 5 (five) minutes as needed for chest pain (pt has not  used recently)  , Disp: , Rfl:     omeprazole (PriLOSEC) 20 mg delayed release capsule, Take 1 capsule (20 mg total) by mouth daily, Disp: 30 capsule, Rfl: 2    oxyCODONE (ROXICODONE) 30 MG immediate release tablet, Take 30 mg by mouth every 4 (four) hours as needed for moderate pain  , Disp: , Rfl:     potassium chloride (K-DUR,KLOR-CON) 20 mEq tablet, TAKE 2 TABS BY MOUTH THREE TIMESA DAY, Disp: , Rfl: 3    ranolazine (RANEXA) 1000 MG SR tablet, Take 1,000 mg by mouth 2 (two) times a day , Disp: , Rfl:     spironolactone (ALDACTONE) 50 mg tablet, Take 50 mg by mouth daily  , Disp: , Rfl: 5    tamsulosin (FLOMAX) 0 4 mg, Take 0 4 mg by mouth daily with dinner , Disp: , Rfl:     TiZANidine (ZANAFLEX) 2 MG capsule, Take 2 mg by mouth 3 (three) times a day , Disp: , Rfl:     topiramate (TOPAMAX) 100 mg tablet, Take 1 tablet (100 mg total) by mouth 2 (two) times a day, Disp: 60 tablet, Rfl: 5    torsemide (DEMADEX) 100 mg tablet, Take 0 5 tablets by mouth 2 (two) times a day, Disp: 60 tablet, Rfl: 0    traZODone (DESYREL) 100 mg tablet, Take 100 mg by mouth daily at bedtime  , Disp: , Rfl:     ULTICARE SHORT PEN NEEDLES 31G X 8 MM MISC, 4 INJECTIONS PER DAY DX  E11 8, Disp: , Rfl: 1    venlafaxine (EFFEXOR-XR) 75 mg 24 hr capsule, Take 75 mg by mouth, Disp: , Rfl:     zolpidem (AMBIEN) 10 mg tablet, Take 10 mg by mouth daily at bedtime as needed for sleep, Disp: , Rfl:     Review of Systems   Constitutional: Negative for activity change, appetite change and fatigue  HENT: Negative for sore throat, trouble swallowing and voice change  Eyes: Negative for visual disturbance     Respiratory: Negative for choking, chest tightness and shortness of breath  Cardiovascular: Negative for chest pain, palpitations and leg swelling  Gastrointestinal: Negative for abdominal pain, constipation and diarrhea  Endocrine: Negative for cold intolerance, heat intolerance, polydipsia, polyphagia and polyuria  Genitourinary: Negative for frequency  Musculoskeletal: Negative for arthralgias and myalgias  Skin: Negative for rash  Neurological: Negative for dizziness and syncope  Hematological: Negative for adenopathy  Psychiatric/Behavioral: Negative for sleep disturbance  All other systems reviewed and are negative  Physical Exam:  Body mass index is 48 89 kg/m²  /70   Pulse 72   Ht 6' 1" (1 854 m)   Wt (!) 168 kg (370 lb 9 6 oz)   BMI 48 89 kg/m²    Wt Readings from Last 3 Encounters:   11/07/18 (!) 168 kg (370 lb 9 6 oz)   10/03/18 (!) 165 kg (363 lb)   09/12/18 (!) 172 kg (379 lb)       Physical Exam   Constitutional: He is oriented to person, place, and time  He appears well-developed and well-nourished  No distress  HENT:   Head: Normocephalic and atraumatic  Mouth/Throat: Oropharynx is clear and moist    Eyes: Pupils are equal, round, and reactive to light  Conjunctivae and EOM are normal    Neck: Normal range of motion  Neck supple  No thyromegaly present  Cardiovascular: Normal rate, regular rhythm and normal heart sounds  No murmur heard  Pulmonary/Chest: Effort normal  No respiratory distress  He has wheezes  He has no rales  Abdominal: Soft  Bowel sounds are normal  He exhibits no distension  There is no tenderness  Musculoskeletal: Normal range of motion  He exhibits no edema  Lymphadenopathy:     He has no cervical adenopathy  Neurological: He is alert and oriented to person, place, and time  Skin: Skin is warm and dry  Psychiatric: He has a normal mood and affect  Vitals reviewed      Diabetic Foot Exam  Not performed, UTD with podiatry    Labs:   10/29/2018 GFR 49, Cr 1 57   10/15/18 Vit D 27  10/29/2018 TSH 4 71,  1/22/18 LDL 60 Trig 263    Lab Results   Component Value Date    HGBA1C 7 6 08/15/2018     No components found for: GLU  Lab Results   Component Value Date    HGBA1C 7 6 08/15/2018       Lab Results   Component Value Date    CREATININE 1 72 (H) 11/22/2017    CREATININE 1 99 (H) 11/15/2017    CREATININE 1 71 (H) 11/07/2017    BUN 28 (H) 11/22/2017     (L) 10/21/2015    K 3 2 (L) 11/22/2017     11/22/2017    CO2 31 11/22/2017     eGFR   Date Value Ref Range Status   11/22/2017 44 ml/min/1 73sq m Final       Lab Results   Component Value Date    CHOL 119 01/06/2015    HDL 27 (L) 11/04/2017    TRIG 177 (H) 11/04/2017       Lab Results   Component Value Date    ALT 30 10/12/2017    AST 16 10/12/2017    ALKPHOS 117 (H) 10/12/2017    BILITOT 0 81 10/19/2015       Lab Results   Component Value Date    MPP5WHIUHZWC 2 340 10/11/2017    [id number] 3 604 03/08/2017    TBW2PSKMCSTS 1 903 08/12/2016     Lab Results   Component Value Date    FREET4 0 95 10/11/2017       Impression & Plan:    Problem List Items Addressed This Visit     Hypertension     Well controlled on current regimen  Type 2 diabetes mellitus with hyperglycemia, with long-term current use of insulin (HCC)     Poorly Controlled based on review of glucose log but improving over past few days since he has switched to protein shakes in prep for gastric sleeve surgery planned 11/19  For now, continue current regimen  IF he begins to have hypoglycemia advised him to reduce Novolog to half (22 units) before protein shakes  Reduce tresiba to 100 units for the two nights prior to surgery  Advised him to send BG readings pre/post op as needed for frequent insulin adjustements and change in insulin requirements  Due to being on intensive insulin therapy,  he at high risk of severe hypoglycemia  Severe hypoglycemia can result in falls, injury, coma, seizure, or death    Check A1C in 3 months before next visit  Relevant Medications    insulin aspart (NOVOLOG FLEXPEN) 100 Units/mL injection pen    Other Relevant Orders    Hemoglobin A1C    Comprehensive metabolic panel    Lipid Panel with Direct LDL reflex    Hyperlipidemia     Continue atorvastatin         Vitamin D deficiency     Continue supplements  Other Visit Diagnoses     Insulin long-term use ([facility] [address] [address] )    -  Primary          Orders Placed This Encounter   Procedures    Hemoglobin A1C     Standing Status:   Future     Standing Expiration Date:   11/7/2019    Comprehensive metabolic panel     This is a patient instruction: Patient fasting for 8 hours or longer recommended  Standing Status:   Future     Standing Expiration Date:   11/7/2019    Lipid Panel with Direct LDL reflex     This is a patient instruction: This test requires patient fasting for 10-12 hours or longer  Drinking of black coffee or black tea is acceptable  Standing Status:   Future     Standing Expiration Date:   11/7/2019       Patient Instructions   If you have any problems with low blood sugars after being on protein shakes, Reduce Novolog to 22 units before protein shakes  Two nights before surgery, Reduce to Tresiba to 100 units  If any problems with low/high sugars before or after surgery, let us know and we will give you insulin instructions  Send BG log every 2 weeks or sooner if problems  Do lab testing in 3 months        Discussed with the patient and all questioned fully answered  He will call me if any problems arise  Follow-up appointment in 3 months       Counseled patient on diagnostic results, prognosis, risk and benefit of treatment options, instruction for management, importance of treatment compliance, Risk  factor reduction and impressions      Samuel Alcantara PA-C

## 2018-11-14 ENCOUNTER — PROCEDURE VISIT (OUTPATIENT)
Dept: NEUROLOGY | Facility: CLINIC | Age: 58
End: 2018-11-14
Payer: MEDICARE

## 2018-11-14 VITALS — SYSTOLIC BLOOD PRESSURE: 147 MMHG | DIASTOLIC BLOOD PRESSURE: 76 MMHG | TEMPERATURE: 97.7 F

## 2018-11-14 DIAGNOSIS — G43.709 CHRONIC MIGRAINE WITHOUT AURA WITHOUT STATUS MIGRAINOSUS, NOT INTRACTABLE: Primary | ICD-10-CM

## 2018-11-14 PROCEDURE — 64615 CHEMODENERV MUSC MIGRAINE: CPT | Performed by: PHYSICIAN ASSISTANT

## 2018-11-14 NOTE — PROGRESS NOTES
Chemodenervation  Date/Time: 11/14/2018 12:12 PM  Performed by: Kike Santamaria by: Vahid Lacey details:     Preparation: Patient was prepped and draped in usual sterile fashion      Prepped With: Alcohol    Anesthesia (see MAR for exact dosages): Anesthesia method:  None  Procedure details:     Position:  Upright  Botox:     Botox Type:  Type A    Brand:  Botox    mL's of Botulinum Toxin:  200    Final Concentration per CC:  200 units    Needle Gauge:  30 G 2 5 inch  Procedures:     Botox Procedures: chronic headache      Indications: migraines    Injection Location:     Head / Face:  L superior cervical paraspinal, R superior cervical paraspinal, L , R , L frontalis, R frontalis, procerus, R medial occipitalis, L medial occipitalis, L temporalis, R temporalis, L superior trapezius and R superior trapezius    L  injection amount:  5 unit(s)    R  injection amount:  5 unit(s)    L medial frontalis:  10 unit(s)    R medial frontalis:  10 unit(s)    L temporalis injection amount:  20 unit(s)    R temporalis injection amount:  20 unit(s)    Procerus injection amount:  5 unit(s)    L medial occipitalis injection amount:  15 unit(s)    R medial occipitalis injection amount:  15 unit(s)    L superior cervical paraspinal injection amount:  10 unit(s)    R superior cervical paraspinal injection amount:  10 unit(s)    L superior trapezius injection amount:  15 unit(s)    R superior trapezius injection amount:  15 unit(s)  Total Units:     Total units used:  200    Total units discarded:  0  Post-procedure details:     Chemodenervation:  Chronic migraine    Facial Nerve Location[de-identified]  Bilateral facial nerve    Patient tolerance of procedure:   Tolerated well, no immediate complications  Comments:      5 units orbicularis oculi bilaterally  35 units right parietal and occipital  All medically necessary

## 2018-12-06 ENCOUNTER — OFFICE VISIT (OUTPATIENT)
Dept: GASTROENTEROLOGY | Facility: MEDICAL CENTER | Age: 58
End: 2018-12-06
Payer: MEDICARE

## 2018-12-06 VITALS
HEIGHT: 73 IN | DIASTOLIC BLOOD PRESSURE: 72 MMHG | WEIGHT: 315 LBS | BODY MASS INDEX: 41.75 KG/M2 | HEART RATE: 68 BPM | SYSTOLIC BLOOD PRESSURE: 124 MMHG | TEMPERATURE: 97.6 F

## 2018-12-06 DIAGNOSIS — K59.09 CHRONIC CONSTIPATION: Primary | ICD-10-CM

## 2018-12-06 DIAGNOSIS — K21.9 GASTROESOPHAGEAL REFLUX DISEASE WITHOUT ESOPHAGITIS: ICD-10-CM

## 2018-12-06 DIAGNOSIS — R13.19 ESOPHAGEAL DYSPHAGIA: ICD-10-CM

## 2018-12-06 DIAGNOSIS — R11.0 NAUSEA: ICD-10-CM

## 2018-12-06 PROCEDURE — 99214 OFFICE O/P EST MOD 30 MIN: CPT | Performed by: PHYSICIAN ASSISTANT

## 2018-12-06 NOTE — PROGRESS NOTES
[facility] - Outpatient Follow-up Note  Khurram Alfonso 66 y o  male MRN: [id number]  Encounter: [id number]          ASSESSMENT AND PLAN:      1  Gastroesophageal reflux disease without esophagitis  2  Esophageal dysphagia   -he is scheduled for a pH study with impedance as well as esophageal manometry for further evaluation of his difficulty swallowing and reflux refractory to PPI therapy  -he had decreased his omeprazole to 20 mg however after his gastric sleeve surgery he has had an increase in his reflux and asks if he can go back to 40 mg  This is very reasonable, if this does not resolve his symptoms I asked him to call the office    -follow-up in the office after the above testing     3  Constipation, unspecified constipation type   -he was previously taking both Linzess and Amitiza and then he added Relistor  Unfortunately the combination medications to give him diarrhea so he has stop the Linzess in the [address]  Discussed with him that I would recommend only taking 1 of these medications and he feels that the Relistor works the best    -   Follow up in 3 months to see how he is doing  4  Nausea   -he has been having nausea since his gastric sleeve surgery, Zofran did not work for his symptoms so he was given Phenergan and he finds this very effective  This is being prescribed by his bariatric surgeon  _________________________    SUBJECTIVE:    61-year-old male past medical history of GERD, hiatal hernia, type 2 diabetes, COPD, ARLEEN, heart failure, CAD and MI on Plavix, CKD stage 3 and presents to the office for follow-up  He reports that he recently had a gastric sleeve at [facility] last month and since then he has had an increase in nausea and vomiting  He did try Zofran given to him by the surgeon but this was ineffective, he was then given Phenergan which worked very well for him  He states he is generally following the recommended diet  He states he has lost 35 lb so far  He was previously taking both Linzess and Amitiza and recently was placed on Relistor by his pain management physician  He did stop taking the Linzess and the Amitiza as the combination of all 3 medications was giving him diarrhea and feels that the Relistor has been working very well for him  He notes that his acid reflux has been worse since the gastric sleeve, he was taking omeprazole 20 mg daily but would like to increase this to 40 mg daily  He had a recent upper endoscopy with nodular gastritis seen but was otherwise unremarkable  REVIEW OF SYSTEMS IS OTHERWISE NEGATIVE        Historical Information   Past Medical History:   Diagnosis Date    Acute on chronic diastolic congestive heart failure (HCC)     Altered gait     Alzheimer disease     per patients,,early onset     Angina pectoris [facility] [address] [facility]     Anxiety     Arthritis     Brain aneurysm     Cardiac disease     Chest pain 1/13/2016    Chronic pain     back/ right groin and rle- has morphine pump    Constipation     COPD (chronic obstructive pulmonary disease) (HCC)     Coronary artery disease     CPAP (continuous positive airway pressure) dependence     Dependent on walker for ambulation     w/c for long distance    Depression     Diabetes mellitus (HCC)     insulin dependent    Dizziness     occ    Dysphagia     Enlarged prostate     GERD (gastroesophageal reflux disease)     Heart failure ([facility])     Hiatal hernia     Hypercholesterolemia     Hypertension     MI (myocardial infarction) ([address]     Migraine     Morbid obesity ([facility])     Neuropathy     Oxygen dependent     Q HS  2LPM with CPAP and prn during day 2-3 LPM     Shortness of breath     Sleep apnea     Stented coronary artery     Stroke ([address] )     vision loss    Urinary frequency     Wears dentures     Wears glasses      Past Surgical History:   Procedure Laterality Date    BACK SURGERY      BRAIN SURGERY      CARDIAC CATHETERIZATION      with stents    CEREBRAL ANEURYSM REPAIR      with coils    COLONOSCOPY      ESOPHAGOGASTRODUODENOSCOPY N/A 7/1/2016    Procedure: ESOPHAGOGASTRODUODENOSCOPY (EGD); Surgeon: Corine Godfrey MD;  [facility]; Service:     HERNIA REPAIR      HIATAL HERNIA REPAIR      KNEE ARTHROSCOPY Right     KNEE ARTHROSCOPY Right     PERONEAL NERVE DECOMPRESSION Right     CO ESOPHAGOGASTRODUODENOSCOPY TRANSORAL DIAGNOSTIC N/A 2/27/2017    Procedure: ESOPHAGOGASTRODUODENOSCOPY (EGD); Surgeon: Corine Godfrey MD;  [facility]; Service: Gastroenterology    CO ESOPHAGOGASTRODUODENOSCOPY TRANSORAL DIAGNOSTIC N/A 8/23/2018    Procedure: ESOPHAGOGASTRODUODENOSCOPY (EGD) with biopsy;  Surgeon: Corine Godfrey MD;  [facility];   Service: Gastroenterology    CO INSERT SPINE INFUSN [facility] N/A 1/19/2017    Procedure: REMOVAL / Rachel Fear;  Surgeon: Denise Richard MD;  [facility];  Service: Orthopedics    CO INSERT SPINE INFUSN [address] [place] [address] N/A 5/16/2016    Procedure: REPLACEMENT AND PROGRAM PUMP ;  Surgeon: Denise Richard MD;  [facility];  Service: Orthopedics     Social History   History   Alcohol Use No     History   Drug Use No     History   Smoking Status    Never Smoker   Smokeless Tobacco    Never Used     Family History   Problem Relation Age of Onset    Diabetes unspecified Mother     Diabetes unspecified Brother     Diabetes unspecified Paternal Uncle     Diabetes unspecified Maternal Grandmother     Diabetes unspecified Paternal Grandmother     Diabetes unspecified Brother        Meds/Allergies       Current Outpatient Prescriptions:     ADVAIR DISKUS 500-50 MCG/DOSE inhaler    albuterol (ACCUNEB) 1 25 MG/3ML nebulizer solution    albuterol (PROVENTIL HFA,VENTOLIN HFA) 90 mcg/act inhaler    amLODIPine (NORVASC) 5 mg tablet    aspirin 81 MG tablet    atorvastatin (LIPITOR) 40 mg tablet    baclofen 10 mg tablet   Cholecalciferol (VITAMIN D3) 5000 units CAPS    citalopram (CeleXA) 40 mg tablet    clopidogrel (PLAVIX) 75 mg tablet    dicyclomine (BENTYL) 20 mg tablet    fluticasone-salmeterol (ADVAIR) 250-50 mcg/dose    gabapentin (NEURONTIN) 800 mg tablet    hydrOXYzine HCL (ATARAX) 25 mg tablet    insulin aspart (NOVOLOG FLEXPEN) 100 Units/mL injection pen    insulin degludec (TRESIBA FLEXTOUCH) 200 units/mL CONCENTRATED U-200 injection pen    isosorbide mononitrate (IMDUR) 30 mg 24 hr tablet    Linaclotide 290 MCG CAPS    lubiprostone (AMITIZA) 24 mcg capsule    memantine (NAMENDA) 10 mg tablet    metolazone (ZAROXOLYN) 5 mg tablet    morphine (MS CONTIN) 100 mg 12 hr tablet    nebivolol (BYSTOLIC) 10 mg tablet    nitroglycerin (NITROSTAT) 0 4 mg SL tablet    omeprazole (PriLOSEC) 20 mg delayed release capsule    oxyCODONE (ROXICODONE) 30 MG immediate release tablet    potassium chloride (K-DUR,KLOR-CON) 20 mEq tablet    ranolazine (RANEXA) 1000 MG SR tablet    spironolactone (ALDACTONE) 50 mg tablet    tamsulosin (FLOMAX) 0 4 mg    TiZANidine (ZANAFLEX) 2 MG capsule    topiramate (TOPAMAX) 100 mg tablet    torsemide (DEMADEX) 100 mg tablet    traZODone (DESYREL) 100 mg tablet    ULTICARE SHORT PEN NEEDLES 31G X 8 MM MISC    venlafaxine (EFFEXOR-XR) 75 mg 24 hr capsule    zolpidem (AMBIEN) 10 mg tablet    No Known Allergies        Objective     Blood pressure 124/72, pulse 68, temperature 97 6 °F (36 4 °C), temperature source Tympanic, height 6' 1" (1 854 m), weight (!) 152 kg (334 lb)  PHYSICAL EXAM:      Physical Exam   Constitutional: He is oriented to person, place, and time  No distress  Morbidly obese, ambulates with a rolling walker   HENT:   Head: Normocephalic and atraumatic  Eyes: Right eye exhibits no discharge  Left eye exhibits no discharge  No scleral icterus  Neck: Neck supple  No tracheal deviation present     Cardiovascular: Normal rate, regular rhythm, normal heart sounds and intact distal pulses  Exam reveals no gallop and no friction rub  No murmur heard  Pulmonary/Chest: Effort normal and breath sounds normal  No respiratory distress  He has no wheezes  He has no rales  Abdominal: Soft  Bowel sounds are normal  He exhibits no distension  There is tenderness (Over incisions)  There is no rebound and no guarding  Incisions consistent with recent gastric sleeve noted, these appear to be healing well, clean and dry  Neurological: He is alert and oriented to person, place, and time  Skin: Skin is warm and dry  Psychiatric: He has a normal mood and affect  Lab Results:   No visits with results within 1 Day(s) from this visit  Latest known visit with results is:   Admission on 08/23/2018, Discharged on 08/23/2018   Component Date Value    POC Glucose 08/23/2018 183*    Case Report 08/23/2018                      Value:Surgical Pathology Report                         Case: [id number]                                   Authorizing Provider:  Delicia Muniz MD           Collected:           08/23/2018 1045              Ordering Location:     [facility]        Received:            08/23/2018 [address] [facility]                                                          Pathologist:           Yunier Oliva MD                                                        Specimens:   A) - Stomach, bx gastric antrum-r/o h pylori                                                        B) - Esophagus, bx proximal esophagus-r/o EOE                                              Final Diagnosis 08/23/2018                      Value: This result contains rich text formatting which cannot be displayed here   Additional Information 08/23/2018                      Value: This result contains rich text formatting which cannot be displayed here  [place] Description 08/23/2018                      Value: This result contains rich text formatting which cannot be displayed here   Clinical Information 08/23/2018                      Value:Nodular gastritis         Radiology Results:   No results found

## 2018-12-10 ENCOUNTER — TELEPHONE (OUTPATIENT)
Dept: NEUROLOGY | Facility: CLINIC | Age: 58
End: 2018-12-10

## 2018-12-13 ENCOUNTER — HOSPITAL ENCOUNTER (OUTPATIENT)
Dept: GASTROENTEROLOGY | Facility: HOSPITAL | Age: 58
Discharge: HOME | End: 2018-12-13
Attending: INTERNAL MEDICINE
Payer: MEDICARE

## 2018-12-13 VITALS
HEART RATE: 66 BPM | TEMPERATURE: 97.9 F | DIASTOLIC BLOOD PRESSURE: 55 MMHG | OXYGEN SATURATION: 94 % | RESPIRATION RATE: 16 BRPM | SYSTOLIC BLOOD PRESSURE: 114 MMHG

## 2018-12-13 DIAGNOSIS — K21.9 GASTROESOPHAGEAL REFLUX DISEASE WITHOUT ESOPHAGITIS: ICD-10-CM

## 2018-12-13 DIAGNOSIS — R13.19 ESOPHAGEAL DYSPHAGIA: ICD-10-CM

## 2018-12-13 PROCEDURE — 91020 GASTRIC MOTILITY STUDIES: CPT

## 2018-12-13 NOTE — PERIOPERATIVE NURSING NOTE
Patient brought in the room and explained on the procedure  Lidocaine 2% solution inserted via nostrils and attempted to intubate the probe via both nostril by me and also Marian Valderrama RN with no success  Patient did not tolerate the procedure and per his request the procedure was aborted because he can not go thru it  I called Dr Jose Elias Gunn office at 1942 and left a voice message to [place]   Patient left the unit ambulating with no disress

## 2018-12-19 DIAGNOSIS — Z79.4 TYPE 2 DIABETES MELLITUS WITH STAGE 3 CHRONIC KIDNEY DISEASE, WITH LONG-TERM CURRENT USE OF INSULIN (HCC): ICD-10-CM

## 2018-12-19 DIAGNOSIS — E11.22 TYPE 2 DIABETES MELLITUS WITH STAGE 3 CHRONIC KIDNEY DISEASE, WITH LONG-TERM CURRENT USE OF INSULIN (HCC): ICD-10-CM

## 2018-12-19 DIAGNOSIS — N18.30 TYPE 2 DIABETES MELLITUS WITH STAGE 3 CHRONIC KIDNEY DISEASE, WITH LONG-TERM CURRENT USE OF INSULIN (HCC): ICD-10-CM

## 2018-12-19 RX ORDER — INSULIN DEGLUDEC 200 U/ML
120 INJECTION, SOLUTION SUBCUTANEOUS
Qty: 54 ML | Refills: 1 | Status: SHIPPED | OUTPATIENT
Start: 2018-12-19 | End: 2019-03-06 | Stop reason: ALTCHOICE

## 2018-12-24 DIAGNOSIS — Z79.4 TYPE 2 DIABETES MELLITUS WITH HYPERGLYCEMIA, WITH LONG-TERM CURRENT USE OF INSULIN (HCC): ICD-10-CM

## 2018-12-24 DIAGNOSIS — E11.65 TYPE 2 DIABETES MELLITUS WITH HYPERGLYCEMIA, WITH LONG-TERM CURRENT USE OF INSULIN (HCC): ICD-10-CM

## 2018-12-24 RX ORDER — INSULIN ASPART 100 [IU]/ML
44 INJECTION, SOLUTION INTRAVENOUS; SUBCUTANEOUS
Qty: 40 PEN | Refills: 1 | Status: SHIPPED | OUTPATIENT
Start: 2018-12-24 | End: 2019-01-14 | Stop reason: SDUPTHER

## 2019-01-14 DIAGNOSIS — E11.65 TYPE 2 DIABETES MELLITUS WITH HYPERGLYCEMIA, WITH LONG-TERM CURRENT USE OF INSULIN (HCC): ICD-10-CM

## 2019-01-14 DIAGNOSIS — Z79.4 TYPE 2 DIABETES MELLITUS WITH HYPERGLYCEMIA, WITH LONG-TERM CURRENT USE OF INSULIN (HCC): ICD-10-CM

## 2019-01-20 ENCOUNTER — APPOINTMENT (EMERGENCY)
Dept: RADIOLOGY | Facility: HOSPITAL | Age: 59
End: 2019-01-20
Payer: MEDICARE

## 2019-01-20 ENCOUNTER — HOSPITAL ENCOUNTER (EMERGENCY)
Facility: HOSPITAL | Age: 59
Discharge: HOME | End: 2019-01-20
Attending: EMERGENCY MEDICINE | Admitting: EMERGENCY MEDICINE
Payer: MEDICARE

## 2019-01-20 VITALS
TEMPERATURE: 98 F | OXYGEN SATURATION: 98 % | HEART RATE: 77 BPM | SYSTOLIC BLOOD PRESSURE: 122 MMHG | DIASTOLIC BLOOD PRESSURE: 62 MMHG | RESPIRATION RATE: 18 BRPM

## 2019-01-20 DIAGNOSIS — M79.671 RIGHT FOOT PAIN: Primary | ICD-10-CM

## 2019-01-20 PROCEDURE — 73610 X-RAY EXAM OF ANKLE: CPT

## 2019-01-20 PROCEDURE — 73630 X-RAY EXAM OF FOOT: CPT

## 2019-01-20 PROCEDURE — 99283 EMERGENCY DEPT VISIT LOW MDM: CPT

## 2019-01-20 PROCEDURE — 96372 THER/PROPH/DIAG INJ SC/IM: CPT

## 2019-01-20 RX ORDER — HYDROMORPHONE HCL/PF 1 MG/ML
1 SYRINGE (ML) INJECTION ONCE
Status: COMPLETED | OUTPATIENT
Start: 2019-01-20 | End: 2019-01-20

## 2019-01-20 RX ADMIN — HYDROMORPHONE HYDROCHLORIDE 1 MG: 1 INJECTION, SOLUTION INTRAMUSCULAR; INTRAVENOUS; SUBCUTANEOUS at 16:08

## 2019-01-20 NOTE — ED PROVIDER NOTES
History  Chief Complaint   Patient presents with    Ankle Pain     pt c/o right ankle pain  also noted abrasion to right knee  states hx of memory loss at baseline and falls a lot  pain started yesterday but unknown as to what may have happened  pain kept pt up all night  65-year-old male with a history of hypertension, diabetes, chronic pain on PCA pump, CVA presents to the emergency department complaining of right ankle and foot pain  Patient states he went to the gym on Thursday and bumped his right knee  He thinks at the same time he twisted his right ankle  Since then he has been having severe pain to the right ankle and top of the right foot  He states he has been using his PCA pump without much relief  He has been ambulating with a walker but it has been very difficult  History provided by:  Patient   used: No    Ankle Pain   Location:  Ankle and foot  Time since incident:  3 days  Injury: yes    Mechanism of injury comment:  Twisting  Ankle location:  R ankle  Foot location:  Dorsum of R foot  Pain details:     Quality:  Shooting    Radiates to:  Does not radiate    Severity:  Severe    Onset quality:  Gradual    Duration:  3 days    Timing:  Constant    Progression:  Worsening  Chronicity:  New  Prior injury to area:  No  Relieved by:  Nothing  Exacerbated by: Movement  Ineffective treatments: Morphine PCA pump  Associated symptoms: swelling    Associated symptoms: no back pain, no decreased ROM, no fatigue, no fever, no itching, no muscle weakness, no neck pain, no numbness, no stiffness and no tingling    Risk factors: obesity    Risk factors: no known bone disorder        Prior to Admission Medications   Prescriptions Last Dose Informant Patient Reported? Taking?    ADVAIR DISKUS 500-50 MCG/DOSE inhaler  Self Yes No   Sig: Inhale 1 puff 2 (two) times a day   Cholecalciferol (VITAMIN D3) 5000 units CAPS  Self No No   Sig: Take 1 capsule (5,000 Units total) by mouth daily   TRESIBA FLEXTOUCH 200 units/mL CONCENTRATED U-200 injection pen   No No   Sig: INJECT 120 UNITS UNDER THE SKIN DAILY AT BEDTIME   TiZANidine (ZANAFLEX) 2 MG capsule  Self Yes No   Sig: Take 2 mg by mouth 3 (three) times a day  ULTICARE SHORT PEN NEEDLES 31G X 8 MM MISC  Self Yes No   Si INJECTIONS PER DAY DX  E11 8   albuterol (ACCUNEB) 1 25 MG/3ML nebulizer solution  Self Yes No   Sig: Take 1 ampule by nebulization every 6 (six) hours as needed for wheezing   albuterol (PROVENTIL HFA,VENTOLIN HFA) 90 mcg/act inhaler  Self Yes No   Sig: Inhale 2 puffs every 6 (six) hours as needed for wheezing   amLODIPine (NORVASC) 5 mg tablet  Self No No   Sig: Take 1 tablet (5 mg total) by mouth daily   aspirin 81 MG tablet  Self Yes No   Sig: Take 81 mg by mouth daily  atorvastatin (LIPITOR) 40 mg tablet  Self Yes No   Sig: Take 40 mg by mouth daily  baclofen 10 mg tablet  Self Yes No   Sig: Take 10 mg by mouth 3 (three) times a day   citalopram (CeleXA) 40 mg tablet  Self Yes No   Sig: TAKE 0 5 TAB (20MG TOTAL) BY MOUTH DAILY   clopidogrel (PLAVIX) 75 mg tablet  Self Yes No   Sig: Take 75 mg by mouth daily  dicyclomine (BENTYL) 20 mg tablet  Self Yes No   Sig: Take 10 mg by mouth every 6 (six) hours as needed   fluticasone-salmeterol (ADVAIR) 250-50 mcg/dose  Self Yes No   Sig: Inhale 1 puff 2 (two) times a day    gabapentin (NEURONTIN) 800 mg tablet  Self Yes No   Sig: Take 800 mg by mouth 2 (two) times a day   hydrOXYzine HCL (ATARAX) 25 mg tablet  Self Yes No   Sig: Take 1-2 tablets by mouth every 6 hours as needed for anxiety     insulin aspart (NOVOLOG FLEXPEN) 100 Units/mL injection pen   No No   Sig: Inject 44 Units under the skin 3 (three) times a day with meals for 90 days   isosorbide mononitrate (IMDUR) 30 mg 24 hr tablet  Self No No   Sig: Take 1 tablet by mouth daily   memantine (NAMENDA) 10 mg tablet  Self Yes No   Sig: Take 10 mg by mouth daily   metolazone (ZAROXOLYN) 5 mg tablet  Self Yes No   Si mg 2 (two) times a week As directed   morphine (MS CONTIN) 100 mg 12 hr tablet  Self Yes No   Sig: Take 100 mg by mouth 2 (two) times a day     nebivolol (BYSTOLIC) 10 mg tablet  Self Yes No   Sig: Take 10 mg by mouth daily  nitroglycerin (NITROSTAT) 0 4 mg SL tablet  Self Yes No   Sig: Place 0 4 mg under the tongue every 5 (five) minutes as needed for chest pain (pt has not  used recently)  omeprazole (PriLOSEC) 20 mg delayed release capsule  Self No No   Sig: Take 1 capsule (20 mg total) by mouth daily   oxyCODONE (ROXICODONE) 30 MG immediate release tablet  Self Yes No   Sig: Take 30 mg by mouth every 4 (four) hours as needed for moderate pain     potassium chloride (K-DUR,KLOR-CON) 20 mEq tablet  Self Yes No   Sig: TAKE 2 TABS BY MOUTH THREE TIMESA DAY   ranolazine (RANEXA) 1000 MG SR tablet  Self Yes No   Sig: Take 1,000 mg by mouth 2 (two) times a day  spironolactone (ALDACTONE) 50 mg tablet  Self Yes No   Sig: Take 50 mg by mouth daily     tamsulosin (FLOMAX) 0 4 mg  Self Yes No   Sig: Take 0 4 mg by mouth daily with dinner  topiramate (TOPAMAX) 100 mg tablet  Self No No   Sig: Take 1 tablet (100 mg total) by mouth 2 (two) times a day   torsemide (DEMADEX) 100 mg tablet  Self No No   Sig: Take 0 5 tablets by mouth 2 (two) times a day   traZODone (DESYREL) 100 mg tablet  Self Yes No   Sig: Take 100 mg by mouth daily at bedtime       venlafaxine (EFFEXOR-XR) 75 mg 24 hr capsule  Self Yes No   Sig: Take 75 mg by mouth   zolpidem (AMBIEN) 10 mg tablet  Self Yes No   Sig: Take 10 mg by mouth daily at bedtime as needed for sleep      Facility-Administered Medications: None       Past Medical History:   Diagnosis Date    Acute on chronic diastolic congestive heart failure (HCC)     Altered gait     Alzheimer disease     per patients,,early onset     Angina p[facility]is [facility] [address] [facility]     Anxiety     Arthritis     Brain aneurysm     Cardiac disease     Chest pain 2016    Chronic pain back/ right groin and rle- has morphine pump    Constipation     [facility] disease) ([facility]     Coronary artery disease     CPAP (continuous positive airway pressure) dependence     Dependent on walker for ambulation     w/c for long distance    Depression     Diabetes mellitus (HCC)     insulin dependent    Dizziness     occ    Dysphagia     Enlarged prostate     GERD (gastroesophageal reflux disease)     Heart failure ([facility])     Hiatal hernia     Hypercholesterolemia     Hypertension     [facility] [address] [facility]     Migraine     Morbid obesity ([facility])     Neuropathy     Oxygen dependent     Q HS  2LPM with CPAP and prn during day 2-3 LPM     Shortness of breath     Sleep apnea     Stented coronary artery     Stroke ([facility] [address] [facility]     vision loss    Urinary frequency     Wears dentures     Wears glasses        Past Surgical History:   Procedure Laterality Date    BACK SURGERY      BRAIN SURGERY      CARDIAC CATHETERIZATION      with stents    CEREBRAL ANEURYSM REPAIR      with coils    COLONOSCOPY      ESOPHAGOGASTRODUODENOSCOPY N/A 7/1/2016    Procedure: ESOPHAGOGASTRODUODENOSCOPY (EGD); Surgeon: Nathan De La Cruz MD;  [facility]; Service:     HERNIA REPAIR      HIATAL HERNIA REPAIR      KNEE ARTHROSCOPY Right     KNEE ARTHROSCOPY Right     PERONEAL NERVE DECOMPRESSION Right     NH ESOPHAGOGASTRODUODENOSCOPY TRANSORAL DIAGNOSTIC N/A 2/27/2017    Procedure: ESOPHAGOGASTRODUODENOSCOPY (EGD); Surgeon: Nathan De La Cruz MD;  [facility]; Service: Gastroenterology    NH ESOPHAGOGASTRODUODENOSCOPY TRANSORAL DIAGNOSTIC N/A 8/23/2018    Procedure: ESOPHAGOGASTRODUODENOSCOPY (EGD) with biopsy;  Surgeon: Nathan De La Cruz MD;  [facility];   Service: Gastroenterology    NH INSERT SPINE INFUSN [address] [place] [address] N/A 1/19/2017    Procedure: REMOVAL / EXCHANGE INTRATHECAL PAIN PUMP;  Surgeon: Sachin Fagan MD;  [facility];  Service: Orthopedics  SD INSERT SPINE INFUSN DEVICE,SUBCUT N/A 5/16/2016    Procedure: REPLACEMENT AND PROGRAM PUMP ;  Surgeon: Pretty Brooks MD;  [facility];  Service: Orthopedics       Family History   Problem Relation Age of Onset    Diabetes unspecified Mother     Diabetes unspecified Brother     Diabetes unspecified Paternal Uncle     Diabetes unspecified Maternal Grandmother     Diabetes unspecified Paternal Grandmother     Diabetes unspecified Brother      I have reviewed and agree with the history as documented  Social History   Substance Use Topics    Smoking status: Never Smoker    Smokeless tobacco: Never Used    Alcohol use No        Review of Systems   Constitutional: Negative  Negative for fatigue and fever  HENT: Negative  Eyes: Negative  Respiratory: Negative  Cardiovascular: Negative  Gastrointestinal: Negative  Genitourinary: Negative  Musculoskeletal: Negative for back pain, neck pain and stiffness  Skin: Negative  Negative for itching  Allergic/Immunologic: Negative  Neurological: Negative  Negative for weakness, numbness and headaches  Hematological: Negative  Psychiatric/Behavioral: Negative  All other systems reviewed and are negative  Physical Exam  Physical Exam   Constitutional: He is oriented to person, place, and time  He appears well-developed and well-nourished  Non-toxic appearance  He does not have a sickly appearance  He does not appear ill  No distress  HENT:   Head: Normocephalic and atraumatic  Right Ear: External ear normal    Left Ear: External ear normal    Eyes: Pupils are equal, round, and reactive to light  Conjunctivae are normal  No scleral icterus  Cardiovascular: Normal rate, regular rhythm and normal heart sounds  Pulmonary/Chest: Effort normal and breath sounds normal    Abdominal: Soft  Bowel sounds are normal  He exhibits no distension and no mass  There is no tenderness  No hernia     Musculoskeletal: Normal range of motion  He exhibits no edema or deformity  Right ankle: He exhibits normal range of motion, no swelling, no ecchymosis, no deformity, no laceration and normal pulse  No tenderness  Right foot: There is tenderness and bony tenderness  There is normal range of motion, no swelling, normal capillary refill, no crepitus, no deformity and no laceration  Feet:    The foot is warm with good dorsalis pedis pulse   Neurological: He is alert and oriented to person, place, and time  He has normal strength and normal reflexes  He exhibits normal muscle tone  Skin: Skin is warm and dry  No rash noted  He is not diaphoretic  No erythema  No pallor  Psychiatric: He has a normal mood and affect  Nursing note and vitals reviewed  Vital Signs  ED Triage Vitals   Temperature Pulse Respirations Blood Pressure SpO2   01/20/19 1320 01/20/19 1320 01/20/19 1320 01/20/19 1320 01/20/19 1320   98 6 °F (37 °C) 98 20 118/66 93 %      Temp Source Heart Rate Source Patient Position - Orthostatic VS BP Location FiO2 (%)   01/20/19 1320 01/20/19 1430 01/20/19 1430 01/20/19 1430 --   Oral Monitor Lying Right arm       Pain Score       01/20/19 1320       Worst Possible Pain           Vitals:    01/20/19 1320 01/20/19 1430   BP: 118/66 122/63   Pulse: 98 99   Patient Position - Orthostatic VS:  Lying       Visual Acuity      ED Medications  Medications   HYDROmorphone (DILAUDID) injection 1 mg (not administered)       Diagnostic Studies  Results Reviewed     None                 XR foot 3+ views RIGHT   Final Result by Akiko Guallpa MD (01/20 1503)      No acute osseous abnormality  Workstation performed: [id number]         XR ankle 3+ views RIGHT   ED Interpretation by Jordan Jhaveri DO (01/20 1430)   No fracture      Final Result by Jong Chase MD (01/20 1449)      No acute osseous abnormality              Workstation performed: [id number]                    Procedures  Procedures Phone Contacts  ED Phone Contact    ED Course                               MDM  Number of Diagnoses or Management Options  Diagnosis management comments: 53-year-old male presents with right foot and ankle pain after hitting his knee and then twisting his ankle 3 days ago  The pain has become worse and not made better with his usual morphine PCA pump  He uses a walker to ambulate but this has been difficult secondary to the pain  On exam he appears well in no distress  He does have significant tenderness to the dorsal aspect of his right foot and heel without evidence of other injury such as swelling or ecchymosis  No erythema  Will do x-rays of right foot and ankle       Amount and/or Complexity of Data Reviewed  Tests in the radiology section of CPT®: ordered and reviewed      CritCare Time    Disposition  Final diagnoses:   Right foot pain     Time reflects when diagnosis was documented in both MDM as applicable and the Disposition within this note     Time User Action Codes Description Comment    1/20/2019  4:02 PM Yuriy Pham Add [L00 912] Right foot pain       ED Disposition     ED Disposition Condition Comment    Discharge  Valente Haskins discharge to home/self care  Condition at discharge: Good        Follow-up Information     Follow up With Specialties Details Why Contact Info       Follow-up with your podiatrist tomorrow           Patient's Medications   Discharge Prescriptions    No medications on file     No discharge procedures on file      ED Provider  Electronically Signed by           Cosme Mancuso DO  01/20/19 8661

## 2019-01-20 NOTE — DISCHARGE INSTRUCTIONS
Arthralgia   WHAT YOU NEED TO KNOW:   Arthralgia is pain in one or more joints, with no inflammation  It may be short-term and get better within 6 to 8 weeks  Arthralgia can be an early sign of arthritis  Arthralgia may be caused by a medical condition, such as a hormone disorder or a tumor  It may also be caused by an infection or injury  DISCHARGE INSTRUCTIONS:   Medicines: The following medicines may  be ordered for you:  · Acetaminophen  decreases pain  Ask how much to take and how often to take it  Follow directions  Acetaminophen can cause liver damage if not taken correctly  · NSAIDs  decrease pain and prevent swelling  Ask your healthcare provider which medicine is right for you  Ask how much to take and when to take it  Take as directed  NSAIDs can cause stomach bleeding and kidney problems if not taken correctly  · Pain relief cream  decreases pain  Use this cream as directed  · Take your medicine as directed  Contact your healthcare provider if you think your medicine is not helping or if you have side effects  Tell him of her if you are allergic to any medicine  Keep a list of the medicines, vitamins, and herbs you take  Include the amounts, and when and why you take them  Bring the list or the pill bottles to follow-up visits  Carry your medicine list with you in case of an emergency  Follow up with your healthcare provider or specialist as directed:  Write down your questions so you remember to ask them during your visits  Self-care:   · Apply heat  to help decrease pain  Use a heating pad or heat wrap  Apply heat for 20 to 30 minutes every 2 hours for as many days as directed  · Rest  as much as possible  Avoid activities that cause joint pain  · Apply ice  to help decrease swelling and pain  Ice may also help prevent tissue damage  Use an ice pack, or put crushed ice in a plastic bag   Cover it with a towel and place it on your painful joint for 15 to 20 minutes every hour or as directed  · Support  the joint with a brace or elastic wrap as directed  · Elevate  your joint above the level of your heart as often as you can to help decrease swelling and pain  Prop your painful joint on pillows or blankets to keep it elevated comfortably  · Lose weight  if you are overweight  Extra weight can put pressure on your joints and cause more pain  Ask your healthcare provider how much you should weigh  Ask him to help you create a weight loss plan  · Exercise  regularly to help improve joint movement and to decrease pain  Ask about the best exercise plan for you  Low-impact exercises can help take the pressure off your joints  Examples are walking, swimming, and water aerobics  Physical therapy:  A physical therapist teaches you exercises to help improve movement and strength, and to decrease pain  Ask your healthcare provider if physical therapy is right for you  Contact your healthcare provider or specialist if:   · You have a fever  · You continue to have joint pain that cannot be relieved with heat, ice, or medicine  · You have pain and inflammation around your joint  · You have questions or concerns about your condition or care  Return to the emergency department if:   · You have sudden, severe pain when you move your joint  · You have a fever and shaking chills  · You cannot move your joint  · You lose feeling on the side of your body where you have the painful joint  © 2017 [employer]  Information is for End User's use only and may not be sold, redistributed or otherwise used for commercial purposes  All illustrations and images included in CareNotes® are the copyrighted property of [employer]  or [employer]  The above information is an  only  It is not intended as medical advice for individual conditions or treatments   Talk to your doctor, nurse or pharmacist before following any medical regimen to see if it is safe and effective for you

## 2019-03-04 ENCOUNTER — TELEPHONE (OUTPATIENT)
Dept: GASTROENTEROLOGY | Facility: CLINIC | Age: 59
End: 2019-03-04

## 2019-03-04 NOTE — TELEPHONE ENCOUNTER
[facility] patient    [address] from manometry called to re inform the office that the manometry was not done on 12-13-18 because he could not tolerate the test

## 2019-03-06 ENCOUNTER — PROCEDURE VISIT (OUTPATIENT)
Dept: NEUROLOGY | Facility: CLINIC | Age: 59
End: 2019-03-06
Payer: MEDICARE

## 2019-03-06 VITALS — TEMPERATURE: 98.1 F

## 2019-03-06 DIAGNOSIS — G43.709 CHRONIC MIGRAINE WITHOUT AURA WITHOUT STATUS MIGRAINOSUS, NOT INTRACTABLE: Primary | ICD-10-CM

## 2019-03-06 PROCEDURE — 64615 CHEMODENERV MUSC MIGRAINE: CPT | Performed by: PSYCHIATRY & NEUROLOGY

## 2019-03-06 NOTE — PROGRESS NOTES
Chemodenervation  Date/Time: 3/6/2019 12:56 PM  Performed by: Dayana Maurer MD  Authorized by: Dayana Maurer MD     Pre-procedure details:     Prepped With: Alcohol      Premedication:  Diazepam  Anesthesia (see MAR for exact dosages): Anesthesia method:  None  Procedure details:     Position:  Upright  Botox:     Botox Type:  Type A    Brand:  Botox    mL's of Botulinum Toxin:  200    Final Concentration per CC:  200 units    Needle Gauge:  30 G 2 5 inch  Procedures:     Botox Procedures: chronic headache      Indications: migraines    Injection Location:     Head / Face:  L superior cervical paraspinal, R superior cervical paraspinal, L , R , L frontalis, R frontalis, L medial occipitalis, R medial occipitalis, procerus, R temporalis, L temporalis, R superior trapezius and L superior trapezius    L  injection amount:  5 unit(s)    R  injection amount:  5 unit(s)    L lateral frontalis:  5 unit(s)    R lateral frontalis:  5 unit(s)    L medial frontalis:  5 unit(s)    R medial frontalis:  5 unit(s)    L temporalis injection amount:  20 unit(s)    R temporalis injection amount:  20 unit(s)    Procerus injection amount:  5 unit(s)    L medial occipitalis injection amount:  15 unit(s)    R medial occipitalis injection amount:  15 unit(s)    L superior cervical paraspinal injection amount:  10 unit(s)    R superior cervical paraspinal injection amount:  10 unit(s)    L superior trapezius injection amount:  15 unit(s)    R superior trapezius injection amount:  15 unit(s)  Total Units:     Total units used:  200    Total units discarded:  0  Post-procedure details:     Chemodenervation:  Chronic migraine    Facial Nerve Location[de-identified]  Bilateral facial nerve    Patient tolerance of procedure: Tolerated well, no immediate complications     bifrontal 45 units to used, this is medically necessary

## 2019-04-04 DIAGNOSIS — Z79.4 TYPE 2 DIABETES MELLITUS WITH HYPERGLYCEMIA, WITH LONG-TERM CURRENT USE OF INSULIN (HCC): ICD-10-CM

## 2019-04-04 DIAGNOSIS — E11.65 TYPE 2 DIABETES MELLITUS WITH HYPERGLYCEMIA, WITH LONG-TERM CURRENT USE OF INSULIN (HCC): ICD-10-CM

## 2019-04-04 RX ORDER — INSULIN ASPART 100 [IU]/ML
INJECTION, SOLUTION INTRAVENOUS; SUBCUTANEOUS
Qty: 40 PEN | Refills: 0 | Status: SHIPPED | OUTPATIENT
Start: 2019-04-04 | End: 2019-05-09 | Stop reason: HOSPADM

## 2019-05-02 ENCOUNTER — HOSPITAL ENCOUNTER (INPATIENT)
Facility: HOSPITAL | Age: 59
LOS: 7 days | Discharge: NURSING, REHAB OR LONG-TERM CARE | DRG: 562 | End: 2019-05-09
Attending: EMERGENCY MEDICINE | Admitting: INTERNAL MEDICINE
Payer: MEDICARE

## 2019-05-02 ENCOUNTER — APPOINTMENT (EMERGENCY)
Dept: RADIOLOGY | Facility: HOSPITAL | Age: 59
DRG: 562 | End: 2019-05-02
Payer: MEDICARE

## 2019-05-02 DIAGNOSIS — G89.4 CHRONIC PAIN DISORDER: ICD-10-CM

## 2019-05-02 DIAGNOSIS — S92.015A CLOSED NONDISPLACED FRACTURE OF BODY OF LEFT CALCANEUS, INITIAL ENCOUNTER: ICD-10-CM
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
## 2019-05-02 DIAGNOSIS — S92.015D CLOSED NONDISPLACED FRACTURE OF BODY OF LEFT CALCANEUS WITH ROUTINE HEALING, SUBSEQUENT ENCOUNTER: ICD-10-CM

## 2019-05-02 DIAGNOSIS — R13.19 ESOPHAGEAL DYSPHAGIA: ICD-10-CM

## 2019-05-02 DIAGNOSIS — K21.9 GASTROESOPHAGEAL REFLUX DISEASE WITHOUT ESOPHAGITIS: ICD-10-CM

## 2019-05-02 DIAGNOSIS — M25.562 PAIN AND SWELLING OF LEFT KNEE: ICD-10-CM

## 2019-05-02 DIAGNOSIS — F11.20 UNCOMPLICATED OPIOID DEPENDENCE (HCC): ICD-10-CM

## 2019-05-02 DIAGNOSIS — M79.604 LEG PAIN, BILATERAL: ICD-10-CM

## 2019-05-02 DIAGNOSIS — Z79.4 TYPE 2 DIABETES MELLITUS WITH HYPERGLYCEMIA, WITH LONG-TERM CURRENT USE OF INSULIN (HCC): ICD-10-CM

## 2019-05-02 DIAGNOSIS — M79.605 LEG PAIN, BILATERAL: ICD-10-CM

## 2019-05-02 DIAGNOSIS — N17.9 AKI (ACUTE KIDNEY INJURY) (HCC): ICD-10-CM

## 2019-05-02 DIAGNOSIS — M25.462 PAIN AND SWELLING OF LEFT KNEE: ICD-10-CM

## 2019-05-02 DIAGNOSIS — R42 DIZZINESS: ICD-10-CM

## 2019-05-02 DIAGNOSIS — E11.65 TYPE 2 DIABETES MELLITUS WITH HYPERGLYCEMIA, WITH LONG-TERM CURRENT USE OF INSULIN (HCC): ICD-10-CM

## 2019-05-02 DIAGNOSIS — R26.2 AMBULATORY DYSFUNCTION: ICD-10-CM

## 2019-05-02 DIAGNOSIS — E87.6 HYPOKALEMIA: Primary | ICD-10-CM

## 2019-05-02 LAB
ANION GAP SERPL CALCULATED.3IONS-SCNC: 11 MMOL/L (ref 4–13)
BASOPHILS # BLD AUTO: 0.06 THOUSANDS/ΜL (ref 0–0.1)
BASOPHILS NFR BLD AUTO: 0 % (ref 0–1)
BUN SERPL-MCNC: 28 MG/DL (ref 5–25)
CALCIUM SERPL-MCNC: 9.4 MG/DL (ref 8.3–10.1)
CHLORIDE SERPL-SCNC: 95 MMOL/L (ref 100–108)
CO2 SERPL-SCNC: 33 MMOL/L (ref 21–32)
CREAT SERPL-MCNC: 2.12 MG/DL (ref 0.6–1.3)
EOSINOPHIL # BLD AUTO: 0.07 THOUSAND/ΜL (ref 0–0.61)
EOSINOPHIL NFR BLD AUTO: 0 % (ref 0–6)
ERYTHROCYTE [DISTWIDTH] IN BLOOD BY AUTOMATED COUNT: 13.2 % (ref 11.6–15.1)
GFR SERPL CREATININE-BSD FRML MDRD: 34 ML/MIN/1.73SQ M
GLUCOSE SERPL-MCNC: 131 MG/DL (ref 65–140)
HCT VFR BLD AUTO: 40 % (ref 36.5–49.3)
HGB BLD-MCNC: 13.3 G/DL (ref 12–17)
IMM GRANULOCYTES # BLD AUTO: 0.21 THOUSAND/UL (ref 0–0.2)
IMM GRANULOCYTES NFR BLD AUTO: 1 % (ref 0–2)
LYMPHOCYTES # BLD AUTO: 2.06 THOUSANDS/ΜL (ref 0.6–4.47)
LYMPHOCYTES NFR BLD AUTO: 8 % (ref 14–44)
MCH RBC QN AUTO: 30.8 PG (ref 26.8–34.3)
MCHC RBC AUTO-ENTMCNC: 33.3 G/DL (ref 31.4–37.4)
MCV RBC AUTO: 93 FL (ref 82–98)
MONOCYTES # BLD AUTO: 1.51 THOUSAND/ΜL (ref 0.17–1.22)
MONOCYTES NFR BLD AUTO: 6 % (ref 4–12)
NEUTROPHILS # BLD AUTO: 20.92 THOUSANDS/ΜL (ref 1.85–7.62)
NEUTS SEG NFR BLD AUTO: 85 % (ref 43–75)
NRBC BLD AUTO-RTO: 0 /100 WBCS
PLATELET # BLD AUTO: 275 THOUSANDS/UL (ref 149–390)
PMV BLD AUTO: 11 FL (ref 8.9–12.7)
POTASSIUM SERPL-SCNC: 2.6 MMOL/L (ref 3.5–5.3)
RBC # BLD AUTO: 4.32 MILLION/UL (ref 3.88–5.62)
SODIUM SERPL-SCNC: 139 MMOL/L (ref 136–145)
WBC # BLD AUTO: 24.83 THOUSAND/UL (ref 4.31–10.16)

## 2019-05-02 PROCEDURE — 80048 BASIC METABOLIC PNL TOTAL CA: CPT | Performed by: EMERGENCY MEDICINE

## 2019-05-02 PROCEDURE — 36415 COLL VENOUS BLD VENIPUNCTURE: CPT | Performed by: EMERGENCY MEDICINE

## 2019-05-02 PROCEDURE — 73590 X-RAY EXAM OF LOWER LEG: CPT

## 2019-05-02 PROCEDURE — 73502 X-RAY EXAM HIP UNI 2-3 VIEWS: CPT

## 2019-05-02 PROCEDURE — 99285 EMERGENCY DEPT VISIT HI MDM: CPT

## 2019-05-02 PROCEDURE — 71045 X-RAY EXAM CHEST 1 VIEW: CPT

## 2019-05-02 PROCEDURE — 99284 EMERGENCY DEPT VISIT MOD MDM: CPT | Performed by: EMERGENCY MEDICINE

## 2019-05-02 PROCEDURE — 99223 1ST HOSP IP/OBS HIGH 75: CPT | Performed by: PHYSICIAN ASSISTANT

## 2019-05-02 PROCEDURE — 73630 X-RAY EXAM OF FOOT: CPT

## 2019-05-02 PROCEDURE — 73560 X-RAY EXAM OF KNEE 1 OR 2: CPT

## 2019-05-02 PROCEDURE — 85025 COMPLETE CBC W/AUTO DIFF WBC: CPT | Performed by: EMERGENCY MEDICINE

## 2019-05-02 PROCEDURE — 93005 ELECTROCARDIOGRAM TRACING: CPT

## 2019-05-02 PROCEDURE — 96365 THER/PROPH/DIAG IV INF INIT: CPT

## 2019-05-02 RX ORDER — ATORVASTATIN CALCIUM 40 MG/1
40 TABLET, FILM COATED ORAL
Status: DISCONTINUED | OUTPATIENT
Start: 2019-05-03 | End: 2019-05-09 | Stop reason: HOSPADM

## 2019-05-02 RX ORDER — ALBUTEROL SULFATE 90 UG/1
2 AEROSOL, METERED RESPIRATORY (INHALATION) EVERY 6 HOURS PRN
Status: DISCONTINUED | OUTPATIENT
Start: 2019-05-02 | End: 2019-05-09 | Stop reason: HOSPADM

## 2019-05-02 RX ORDER — POTASSIUM CHLORIDE 14.9 MG/ML
20 INJECTION INTRAVENOUS ONCE
Status: COMPLETED | OUTPATIENT
Start: 2019-05-03 | End: 2019-05-03

## 2019-05-02 RX ORDER — CITALOPRAM 20 MG/1
20 TABLET ORAL DAILY
Status: DISCONTINUED | OUTPATIENT
Start: 2019-05-03 | End: 2019-05-09 | Stop reason: HOSPADM

## 2019-05-02 RX ORDER — BACLOFEN 10 MG/1
10 TABLET ORAL 3 TIMES DAILY
Status: DISCONTINUED | OUTPATIENT
Start: 2019-05-03 | End: 2019-05-09 | Stop reason: HOSPADM

## 2019-05-02 RX ORDER — PROCHLORPERAZINE MALEATE 10 MG
10 TABLET ORAL EVERY 6 HOURS PRN
Status: DISCONTINUED | OUTPATIENT
Start: 2019-05-02 | End: 2019-05-09 | Stop reason: HOSPADM

## 2019-05-02 RX ORDER — ACETAMINOPHEN 325 MG/1
650 TABLET ORAL EVERY 6 HOURS PRN
Status: DISCONTINUED | OUTPATIENT
Start: 2019-05-02 | End: 2019-05-09 | Stop reason: HOSPADM

## 2019-05-02 RX ORDER — ASPIRIN 81 MG/1
81 TABLET, CHEWABLE ORAL DAILY
Status: DISCONTINUED | OUTPATIENT
Start: 2019-05-03 | End: 2019-05-09 | Stop reason: HOSPADM

## 2019-05-02 RX ORDER — FLUTICASONE FUROATE AND VILANTEROL 200; 25 UG/1; UG/1
1 POWDER RESPIRATORY (INHALATION)
Status: DISCONTINUED | OUTPATIENT
Start: 2019-05-03 | End: 2019-05-09 | Stop reason: HOSPADM

## 2019-05-02 RX ORDER — TRAZODONE HYDROCHLORIDE 100 MG/1
100 TABLET ORAL
Status: DISCONTINUED | OUTPATIENT
Start: 2019-05-03 | End: 2019-05-09 | Stop reason: HOSPADM

## 2019-05-02 RX ORDER — PANTOPRAZOLE SODIUM 40 MG/1
40 TABLET, DELAYED RELEASE ORAL
Status: DISCONTINUED | OUTPATIENT
Start: 2019-05-03 | End: 2019-05-09 | Stop reason: HOSPADM

## 2019-05-02 RX ORDER — FOLIC ACID 1 MG/1
1000 TABLET ORAL DAILY
Status: DISCONTINUED | OUTPATIENT
Start: 2019-05-03 | End: 2019-05-09 | Stop reason: HOSPADM

## 2019-05-02 RX ORDER — POTASSIUM CHLORIDE 20 MEQ/1
40 TABLET, EXTENDED RELEASE ORAL ONCE
Status: DISCONTINUED | OUTPATIENT
Start: 2019-05-02 | End: 2019-05-02

## 2019-05-02 RX ORDER — POTASSIUM CHLORIDE 14.9 MG/ML
20 INJECTION INTRAVENOUS ONCE
Status: COMPLETED | OUTPATIENT
Start: 2019-05-02 | End: 2019-05-03

## 2019-05-02 RX ADMIN — POTASSIUM CHLORIDE 20 MEQ: 200 INJECTION, SOLUTION INTRAVENOUS at 22:27

## 2019-05-02 RX ADMIN — SODIUM CHLORIDE 1000 ML: 0.9 INJECTION, SOLUTION INTRAVENOUS at 22:36
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
## 2019-05-03 PROBLEM — S92.015A CLOSED NONDISPLACED FRACTURE OF BODY OF LEFT CALCANEUS: Status: ACTIVE | Noted: 2019-05-03

## 2019-05-03 LAB
ALBUMIN SERPL BCP-MCNC: 2.5 G/DL (ref 3.5–5)
ALP SERPL-CCNC: 72 U/L (ref 46–116)
ALT SERPL W P-5'-P-CCNC: 28 U/L (ref 12–78)
ANION GAP SERPL CALCULATED.3IONS-SCNC: 8 MMOL/L (ref 4–13)
AST SERPL W P-5'-P-CCNC: 15 U/L (ref 5–45)
ATRIAL RATE: 68 BPM
BASOPHILS # BLD AUTO: 0.05 THOUSANDS/ΜL (ref 0–0.1)
BASOPHILS NFR BLD AUTO: 0 % (ref 0–1)
BILIRUB DIRECT SERPL-MCNC: 0.25 MG/DL (ref 0–0.2)
BILIRUB SERPL-MCNC: 0.56 MG/DL (ref 0.2–1)
BUN SERPL-MCNC: 26 MG/DL (ref 5–25)
CALCIUM SERPL-MCNC: 9 MG/DL (ref 8.3–10.1)
CHLORIDE SERPL-SCNC: 100 MMOL/L (ref 100–108)
CO2 SERPL-SCNC: 32 MMOL/L (ref 21–32)
CREAT SERPL-MCNC: 1.65 MG/DL (ref 0.6–1.3)
EOSINOPHIL # BLD AUTO: 0.16 THOUSAND/ΜL (ref 0–0.61)
EOSINOPHIL NFR BLD AUTO: 1 % (ref 0–6)
ERYTHROCYTE [DISTWIDTH] IN BLOOD BY AUTOMATED COUNT: 13.2 % (ref 11.6–15.1)
GFR SERPL CREATININE-BSD FRML MDRD: 46 ML/MIN/1.73SQ M
GLUCOSE SERPL-MCNC: 116 MG/DL (ref 65–140)
GLUCOSE SERPL-MCNC: 121 MG/DL (ref 65–140)
GLUCOSE SERPL-MCNC: 122 MG/DL (ref 65–140)
GLUCOSE SERPL-MCNC: 157 MG/DL (ref 65–140)
GLUCOSE SERPL-MCNC: 159 MG/DL (ref 65–140)
HCT VFR BLD AUTO: 34.6 % (ref 36.5–49.3)
HGB BLD-MCNC: 11.6 G/DL (ref 12–17)
IMM GRANULOCYTES # BLD AUTO: 0.12 THOUSAND/UL (ref 0–0.2)
IMM GRANULOCYTES NFR BLD AUTO: 1 % (ref 0–2)
LYMPHOCYTES # BLD AUTO: 1.73 THOUSANDS/ΜL (ref 0.6–4.47)
LYMPHOCYTES NFR BLD AUTO: 12 % (ref 14–44)
MAGNESIUM SERPL-MCNC: 1.9 MG/DL (ref 1.6–2.6)
MCH RBC QN AUTO: 30.9 PG (ref 26.8–34.3)
MCHC RBC AUTO-ENTMCNC: 33.5 G/DL (ref 31.4–37.4)
MCV RBC AUTO: 92 FL (ref 82–98)
MONOCYTES # BLD AUTO: 1.15 THOUSAND/ΜL (ref 0.17–1.22)
MONOCYTES NFR BLD AUTO: 8 % (ref 4–12)
NEUTROPHILS # BLD AUTO: 11.83 THOUSANDS/ΜL (ref 1.85–7.62)
NEUTS SEG NFR BLD AUTO: 78 % (ref 43–75)
NRBC BLD AUTO-RTO: 0 /100 WBCS
P AXIS: 35 DEGREES
PLATELET # BLD AUTO: 217 THOUSANDS/UL (ref 149–390)
PMV BLD AUTO: 10.6 FL (ref 8.9–12.7)
POTASSIUM SERPL-SCNC: 2.8 MMOL/L (ref 3.5–5.3)
PR INTERVAL: 182 MS
PROT SERPL-MCNC: 6.4 G/DL (ref 6.4–8.2)
QRS AXIS: 151 DEGREES
QRSD INTERVAL: 142 MS
QT INTERVAL: 438 MS
QTC INTERVAL: 465 MS
RBC # BLD AUTO: 3.75 MILLION/UL (ref 3.88–5.62)
SODIUM SERPL-SCNC: 140 MMOL/L (ref 136–145)
T WAVE AXIS: 55 DEGREES
TSH SERPL DL<=0.05 MIU/L-ACNC: 1.36 UIU/ML (ref 0.36–3.74)
VENTRICULAR RATE: 68 BPM
WBC # BLD AUTO: 15.04 THOUSAND/UL (ref 4.31–10.16)

## 2019-05-03 PROCEDURE — 83735 ASSAY OF MAGNESIUM: CPT | Performed by: PHYSICIAN ASSISTANT

## 2019-05-03 PROCEDURE — 80048 BASIC METABOLIC PNL TOTAL CA: CPT | Performed by: PHYSICIAN ASSISTANT

## 2019-05-03 PROCEDURE — 93010 ELECTROCARDIOGRAM REPORT: CPT | Performed by: INTERNAL MEDICINE

## 2019-05-03 PROCEDURE — 80076 HEPATIC FUNCTION PANEL: CPT | Performed by: PHYSICIAN ASSISTANT

## 2019-05-03 PROCEDURE — 85025 COMPLETE CBC W/AUTO DIFF WBC: CPT | Performed by: PHYSICIAN ASSISTANT

## 2019-05-03 PROCEDURE — 84443 ASSAY THYROID STIM HORMONE: CPT | Performed by: PHYSICIAN ASSISTANT

## 2019-05-03 PROCEDURE — 99232 SBSQ HOSP IP/OBS MODERATE 35: CPT | Performed by: FAMILY MEDICINE

## 2019-05-03 PROCEDURE — 82948 REAGENT STRIP/BLOOD GLUCOSE: CPT

## 2019-05-03 PROCEDURE — 99222 1ST HOSP IP/OBS MODERATE 55: CPT | Performed by: INTERNAL MEDICINE

## 2019-05-03 RX ORDER — MEMANTINE HYDROCHLORIDE 5 MG/1
10 TABLET ORAL DAILY
Status: DISCONTINUED | OUTPATIENT
Start: 2019-05-03 | End: 2019-05-09 | Stop reason: HOSPADM

## 2019-05-03 RX ORDER — RANOLAZINE 500 MG/1
1000 TABLET, EXTENDED RELEASE ORAL 2 TIMES DAILY
Status: DISCONTINUED | OUTPATIENT
Start: 2019-05-03 | End: 2019-05-09 | Stop reason: HOSPADM

## 2019-05-03 RX ORDER — GABAPENTIN 400 MG/1
800 CAPSULE ORAL 2 TIMES DAILY
Status: DISCONTINUED | OUTPATIENT
Start: 2019-05-03 | End: 2019-05-03

## 2019-05-03 RX ORDER — POTASSIUM CHLORIDE 20 MEQ/1
40 TABLET, EXTENDED RELEASE ORAL
Status: DISCONTINUED | OUTPATIENT
Start: 2019-05-03 | End: 2019-05-07

## 2019-05-03 RX ORDER — TOPIRAMATE 25 MG/1
100 TABLET ORAL 2 TIMES DAILY
Status: DISCONTINUED | OUTPATIENT
Start: 2019-05-03 | End: 2019-05-09 | Stop reason: HOSPADM

## 2019-05-03 RX ORDER — SENNOSIDES 8.6 MG
1 TABLET ORAL
Status: DISCONTINUED | OUTPATIENT
Start: 2019-05-03 | End: 2019-05-09 | Stop reason: HOSPADM

## 2019-05-03 RX ORDER — OXYCODONE HYDROCHLORIDE 10 MG/1
30 TABLET ORAL EVERY 6 HOURS PRN
Status: DISCONTINUED | OUTPATIENT
Start: 2019-05-03 | End: 2019-05-09 | Stop reason: HOSPADM

## 2019-05-03 RX ORDER — GABAPENTIN 400 MG/1
800 CAPSULE ORAL 2 TIMES DAILY
Status: DISCONTINUED | OUTPATIENT
Start: 2019-05-03 | End: 2019-05-04 | Stop reason: SDUPTHER

## 2019-05-03 RX ORDER — POLYETHYLENE GLYCOL 3350 17 G/17G
17 POWDER, FOR SOLUTION ORAL 2 TIMES DAILY
Status: DISCONTINUED | OUTPATIENT
Start: 2019-05-03 | End: 2019-05-09 | Stop reason: HOSPADM

## 2019-05-03 RX ORDER — HEPARIN SODIUM 5000 [USP'U]/ML
5000 INJECTION, SOLUTION INTRAVENOUS; SUBCUTANEOUS EVERY 8 HOURS SCHEDULED
Status: DISCONTINUED | OUTPATIENT
Start: 2019-05-03 | End: 2019-05-09 | Stop reason: HOSPADM

## 2019-05-03 RX ORDER — DICYCLOMINE HCL 20 MG
10 TABLET ORAL EVERY 6 HOURS PRN
Status: DISCONTINUED | OUTPATIENT
Start: 2019-05-03 | End: 2019-05-09 | Stop reason: HOSPADM

## 2019-05-03 RX ORDER — FAMOTIDINE 20 MG/1
20 TABLET, FILM COATED ORAL DAILY PRN
Status: DISCONTINUED | OUTPATIENT
Start: 2019-05-03 | End: 2019-05-09 | Stop reason: HOSPADM

## 2019-05-03 RX ORDER — MORPHINE SULFATE 30 MG/1
60 TABLET, FILM COATED, EXTENDED RELEASE ORAL EVERY 12 HOURS SCHEDULED
Status: DISCONTINUED | OUTPATIENT
Start: 2019-05-03 | End: 2019-05-09 | Stop reason: HOSPADM

## 2019-05-03 RX ADMIN — GABAPENTIN 800 MG: 400 CAPSULE ORAL at 08:43

## 2019-05-03 RX ADMIN — MORPHINE SULFATE 60 MG: 30 TABLET, EXTENDED RELEASE ORAL at 08:43

## 2019-05-03 RX ADMIN — STANDARDIZED SENNA CONCENTRATE 8.6 MG: 8.6 TABLET ORAL at 22:20

## 2019-05-03 RX ADMIN — POLYETHYLENE GLYCOL 3350 17 G: 17 POWDER, FOR SOLUTION ORAL at 14:25

## 2019-05-03 RX ADMIN — SODIUM CHLORIDE 500 ML: 0.9 INJECTION, SOLUTION INTRAVENOUS at 23:42

## 2019-05-03 RX ADMIN — FOLIC ACID 1000 MCG: 1 TABLET ORAL at 08:43

## 2019-05-03 RX ADMIN — ASPIRIN 81 MG 81 MG: 81 TABLET ORAL at 08:43

## 2019-05-03 RX ADMIN — TOPIRAMATE 100 MG: 25 TABLET, FILM COATED ORAL at 18:07

## 2019-05-03 RX ADMIN — HEPARIN SODIUM 5000 UNITS: 5000 INJECTION INTRAVENOUS; SUBCUTANEOUS at 01:38

## 2019-05-03 RX ADMIN — HEPARIN SODIUM 5000 UNITS: 5000 INJECTION INTRAVENOUS; SUBCUTANEOUS at 08:43

## 2019-05-03 RX ADMIN — TORSEMIDE 50 MG: 20 TABLET ORAL at 16:52

## 2019-05-03 RX ADMIN — MORPHINE SULFATE 105 MG: 30 TABLET, EXTENDED RELEASE ORAL at 18:44

## 2019-05-03 RX ADMIN — POTASSIUM CHLORIDE 20 MEQ: 200 INJECTION, SOLUTION INTRAVENOUS at 01:00

## 2019-05-03 RX ADMIN — PANTOPRAZOLE SODIUM 40 MG: 40 TABLET, DELAYED RELEASE ORAL at 05:19

## 2019-05-03 RX ADMIN — BACLOFEN 10 MG: 10 TABLET ORAL at 08:43

## 2019-05-03 RX ADMIN — CITALOPRAM HYDROBROMIDE 20 MG: 20 TABLET ORAL at 08:43

## 2019-05-03 RX ADMIN — MEMANTINE 10 MG: 5 TABLET ORAL at 16:51

## 2019-05-03 RX ADMIN — INSULIN LISPRO 1 UNITS: 100 INJECTION, SOLUTION INTRAVENOUS; SUBCUTANEOUS at 22:33

## 2019-05-03 RX ADMIN — INSULIN LISPRO 20 UNITS: 100 INJECTION, SOLUTION INTRAVENOUS; SUBCUTANEOUS at 08:43

## 2019-05-03 RX ADMIN — FLUTICASONE FUROATE AND VILANTEROL TRIFENATATE 1 PUFF: 200; 25 POWDER RESPIRATORY (INHALATION) at 08:43

## 2019-05-03 RX ADMIN — POTASSIUM CHLORIDE 20 MEQ: 200 INJECTION, SOLUTION INTRAVENOUS at 03:36

## 2019-05-03 RX ADMIN — FAMOTIDINE 20 MG: 20 TABLET ORAL at 14:27

## 2019-05-03 RX ADMIN — TRAZODONE HYDROCHLORIDE 100 MG: 100 TABLET ORAL at 01:00

## 2019-05-03 RX ADMIN — BACLOFEN 10 MG: 10 TABLET ORAL at 16:52

## 2019-05-03 RX ADMIN — INSULIN LISPRO 1 UNITS: 100 INJECTION, SOLUTION INTRAVENOUS; SUBCUTANEOUS at 11:59

## 2019-05-03 RX ADMIN — BACLOFEN 10 MG: 10 TABLET ORAL at 22:20

## 2019-05-03 RX ADMIN — INSULIN LISPRO 20 UNITS: 100 INJECTION, SOLUTION INTRAVENOUS; SUBCUTANEOUS at 11:59

## 2019-05-03 RX ADMIN — ATORVASTATIN CALCIUM 40 MG: 40 TABLET, FILM COATED ORAL at 16:51

## 2019-05-03 RX ADMIN — INSULIN LISPRO 20 UNITS: 100 INJECTION, SOLUTION INTRAVENOUS; SUBCUTANEOUS at 16:55

## 2019-05-03 RX ADMIN — GABAPENTIN 800 MG: 400 CAPSULE ORAL at 18:07

## 2019-05-03 RX ADMIN — TRAZODONE HYDROCHLORIDE 100 MG: 100 TABLET ORAL at 22:20

## 2019-05-03 RX ADMIN — OXYCODONE HYDROCHLORIDE 30 MG: 10 TABLET ORAL at 11:48

## 2019-05-03 RX ADMIN — OXYCODONE HYDROCHLORIDE 30 MG: 10 TABLET ORAL at 01:42

## 2019-05-03 RX ADMIN — HEPARIN SODIUM 5000 UNITS: 5000 INJECTION INTRAVENOUS; SUBCUTANEOUS at 23:38

## 2019-05-03 RX ADMIN — SODIUM CHLORIDE, PRESERVATIVE FREE 0.04 MG: 5 INJECTION INTRAVENOUS at 23:58

## 2019-05-03 RX ADMIN — SODIUM CHLORIDE, PRESERVATIVE FREE 0.04 MG: 5 INJECTION INTRAVENOUS at 23:46

## 2019-05-03 RX ADMIN — POTASSIUM CHLORIDE 40 MEQ: 1500 TABLET, EXTENDED RELEASE ORAL at 16:52

## 2019-05-03 RX ADMIN — HEPARIN SODIUM 5000 UNITS: 5000 INJECTION INTRAVENOUS; SUBCUTANEOUS at 16:51

## 2019-05-03 RX ADMIN — OXYCODONE HYDROCHLORIDE 30 MG: 10 TABLET ORAL at 17:53

## 2019-05-03 RX ADMIN — RANOLAZINE 1000 MG: 500 TABLET, FILM COATED, EXTENDED RELEASE ORAL at 18:07

## 2019-05-03 RX ADMIN — POLYETHYLENE GLYCOL 3350 17 G: 17 POWDER, FOR SOLUTION ORAL at 18:07

## 2019-05-03 RX ADMIN — LUBIPROSTONE 24 MCG: 24 CAPSULE, GELATIN COATED ORAL at 16:51

## 2019-05-03 RX ADMIN — BACLOFEN 10 MG: 10 TABLET ORAL at 01:00

## 2019-05-04 ENCOUNTER — APPOINTMENT (INPATIENT)
Dept: CT IMAGING | Facility: HOSPITAL | Age: 59
DRG: 562 | End: 2019-05-04
Payer: MEDICARE

## 2019-05-04 LAB
ANION GAP SERPL CALCULATED.3IONS-SCNC: 7 MMOL/L (ref 4–13)
BUN SERPL-MCNC: 22 MG/DL (ref 5–25)
CALCIUM SERPL-MCNC: 9.2 MG/DL (ref 8.3–10.1)
CHLORIDE SERPL-SCNC: 100 MMOL/L (ref 100–108)
CO2 SERPL-SCNC: 34 MMOL/L (ref 21–32)
CREAT SERPL-MCNC: 1.62 MG/DL (ref 0.6–1.3)
GFR SERPL CREATININE-BSD FRML MDRD: 47 ML/MIN/1.73SQ M
GLUCOSE SERPL-MCNC: 103 MG/DL (ref 65–140)
GLUCOSE SERPL-MCNC: 131 MG/DL (ref 65–140)
GLUCOSE SERPL-MCNC: 131 MG/DL (ref 65–140)
GLUCOSE SERPL-MCNC: 132 MG/DL (ref 65–140)
GLUCOSE SERPL-MCNC: 151 MG/DL (ref 65–140)
MAGNESIUM SERPL-MCNC: 2 MG/DL (ref 1.6–2.6)
POTASSIUM SERPL-SCNC: 3.4 MMOL/L (ref 3.5–5.3)
SODIUM SERPL-SCNC: 141 MMOL/L (ref 136–145)

## 2019-05-04 PROCEDURE — 82948 REAGENT STRIP/BLOOD GLUCOSE: CPT

## 2019-05-04 PROCEDURE — 99232 SBSQ HOSP IP/OBS MODERATE 35: CPT | Performed by: FAMILY MEDICINE

## 2019-05-04 PROCEDURE — 83735 ASSAY OF MAGNESIUM: CPT | Performed by: FAMILY MEDICINE

## 2019-05-04 PROCEDURE — 80048 BASIC METABOLIC PNL TOTAL CA: CPT | Performed by: FAMILY MEDICINE

## 2019-05-04 PROCEDURE — 70450 CT HEAD/BRAIN W/O DYE: CPT

## 2019-05-04 RX ORDER — POTASSIUM CHLORIDE 20 MEQ/1
40 TABLET, EXTENDED RELEASE ORAL ONCE
Status: COMPLETED | OUTPATIENT
Start: 2019-05-04 | End: 2019-05-04

## 2019-05-04 RX ORDER — GABAPENTIN 800 MG/1
800 TABLET ORAL 2 TIMES DAILY
Status: DISCONTINUED | OUTPATIENT
Start: 2019-05-04 | End: 2019-05-09 | Stop reason: HOSPADM

## 2019-05-04 RX ADMIN — MORPHINE SULFATE 60 MG: 30 TABLET, EXTENDED RELEASE ORAL at 08:27

## 2019-05-04 RX ADMIN — TOPIRAMATE 100 MG: 25 TABLET, FILM COATED ORAL at 08:26

## 2019-05-04 RX ADMIN — ASPIRIN 81 MG 81 MG: 81 TABLET ORAL at 08:26

## 2019-05-04 RX ADMIN — BACLOFEN 10 MG: 10 TABLET ORAL at 21:41

## 2019-05-04 RX ADMIN — PANTOPRAZOLE SODIUM 40 MG: 40 TABLET, DELAYED RELEASE ORAL at 06:09

## 2019-05-04 RX ADMIN — CITALOPRAM HYDROBROMIDE 20 MG: 20 TABLET ORAL at 08:27

## 2019-05-04 RX ADMIN — BACLOFEN 10 MG: 10 TABLET ORAL at 17:11

## 2019-05-04 RX ADMIN — POTASSIUM CHLORIDE 40 MEQ: 1500 TABLET, EXTENDED RELEASE ORAL at 12:21

## 2019-05-04 RX ADMIN — FLUTICASONE FUROATE AND VILANTEROL TRIFENATATE 1 PUFF: 200; 25 POWDER RESPIRATORY (INHALATION) at 08:28

## 2019-05-04 RX ADMIN — LUBIPROSTONE 24 MCG: 24 CAPSULE, GELATIN COATED ORAL at 08:28

## 2019-05-04 RX ADMIN — ATORVASTATIN CALCIUM 40 MG: 40 TABLET, FILM COATED ORAL at 17:11

## 2019-05-04 RX ADMIN — POTASSIUM CHLORIDE 40 MEQ: 1500 TABLET, EXTENDED RELEASE ORAL at 17:11

## 2019-05-04 RX ADMIN — HEPARIN SODIUM 5000 UNITS: 5000 INJECTION INTRAVENOUS; SUBCUTANEOUS at 17:12

## 2019-05-04 RX ADMIN — INSULIN LISPRO 20 UNITS: 100 INJECTION, SOLUTION INTRAVENOUS; SUBCUTANEOUS at 08:28

## 2019-05-04 RX ADMIN — STANDARDIZED SENNA CONCENTRATE 8.6 MG: 8.6 TABLET ORAL at 21:41

## 2019-05-04 RX ADMIN — TORSEMIDE 50 MG: 20 TABLET ORAL at 17:12

## 2019-05-04 RX ADMIN — MEMANTINE 10 MG: 5 TABLET ORAL at 08:27

## 2019-05-04 RX ADMIN — INSULIN LISPRO 20 UNITS: 100 INJECTION, SOLUTION INTRAVENOUS; SUBCUTANEOUS at 17:12

## 2019-05-04 RX ADMIN — POLYETHYLENE GLYCOL 3350 17 G: 17 POWDER, FOR SOLUTION ORAL at 17:11

## 2019-05-04 RX ADMIN — GABAPENTIN 800 MG: 800 TABLET ORAL at 17:12

## 2019-05-04 RX ADMIN — FOLIC ACID 1000 MCG: 1 TABLET ORAL at 08:27

## 2019-05-04 RX ADMIN — LUBIPROSTONE 24 MCG: 24 CAPSULE, GELATIN COATED ORAL at 17:12

## 2019-05-04 RX ADMIN — BACLOFEN 10 MG: 10 TABLET ORAL at 08:27

## 2019-05-04 RX ADMIN — INSULIN LISPRO 20 UNITS: 100 INJECTION, SOLUTION INTRAVENOUS; SUBCUTANEOUS at 12:21

## 2019-05-04 RX ADMIN — POTASSIUM CHLORIDE 40 MEQ: 1500 TABLET, EXTENDED RELEASE ORAL at 08:26

## 2019-05-04 RX ADMIN — OXYCODONE HYDROCHLORIDE 30 MG: 10 TABLET ORAL at 12:20

## 2019-05-04 RX ADMIN — INSULIN LISPRO 1 UNITS: 100 INJECTION, SOLUTION INTRAVENOUS; SUBCUTANEOUS at 08:28

## 2019-05-04 RX ADMIN — POLYETHYLENE GLYCOL 3350 17 G: 17 POWDER, FOR SOLUTION ORAL at 08:25

## 2019-05-04 RX ADMIN — TOPIRAMATE 100 MG: 25 TABLET, FILM COATED ORAL at 17:11

## 2019-05-04 RX ADMIN — RANOLAZINE 1000 MG: 500 TABLET, FILM COATED, EXTENDED RELEASE ORAL at 08:26

## 2019-05-04 RX ADMIN — RANOLAZINE 1000 MG: 500 TABLET, FILM COATED, EXTENDED RELEASE ORAL at 17:11

## 2019-05-04 RX ADMIN — POTASSIUM CHLORIDE 40 MEQ: 1500 TABLET, EXTENDED RELEASE ORAL at 10:22

## 2019-05-04 RX ADMIN — HEPARIN SODIUM 5000 UNITS: 5000 INJECTION INTRAVENOUS; SUBCUTANEOUS at 08:25

## 2019-05-05 PROBLEM — M25.462 PAIN AND SWELLING OF LEFT KNEE: Status: ACTIVE | Noted: 2019-05-05

## 2019-05-05 PROBLEM — M25.562 PAIN AND SWELLING OF LEFT KNEE: Status: ACTIVE | Noted: 2019-05-05

## 2019-05-05 LAB
ANION GAP SERPL CALCULATED.3IONS-SCNC: 7 MMOL/L (ref 4–13)
BUN SERPL-MCNC: 20 MG/DL (ref 5–25)
CALCIUM SERPL-MCNC: 9.1 MG/DL (ref 8.3–10.1)
CHLORIDE SERPL-SCNC: 102 MMOL/L (ref 100–108)
CO2 SERPL-SCNC: 30 MMOL/L (ref 21–32)
CREAT SERPL-MCNC: 1.66 MG/DL (ref 0.6–1.3)
GFR SERPL CREATININE-BSD FRML MDRD: 45 ML/MIN/1.73SQ M
GLUCOSE SERPL-MCNC: 100 MG/DL (ref 65–140)
GLUCOSE SERPL-MCNC: 140 MG/DL (ref 65–140)
GLUCOSE SERPL-MCNC: 149 MG/DL (ref 65–140)
GLUCOSE SERPL-MCNC: 154 MG/DL (ref 65–140)
GLUCOSE SERPL-MCNC: 162 MG/DL (ref 65–140)
POTASSIUM SERPL-SCNC: 4.1 MMOL/L (ref 3.5–5.3)
SODIUM SERPL-SCNC: 139 MMOL/L (ref 136–145)

## 2019-05-05 PROCEDURE — 80048 BASIC METABOLIC PNL TOTAL CA: CPT | Performed by: FAMILY MEDICINE

## 2019-05-05 PROCEDURE — 82948 REAGENT STRIP/BLOOD GLUCOSE: CPT

## 2019-05-05 PROCEDURE — 99232 SBSQ HOSP IP/OBS MODERATE 35: CPT | Performed by: FAMILY MEDICINE

## 2019-05-05 RX ADMIN — BACLOFEN 10 MG: 10 TABLET ORAL at 17:23

## 2019-05-05 RX ADMIN — FLUTICASONE FUROATE AND VILANTEROL TRIFENATATE 1 PUFF: 200; 25 POWDER RESPIRATORY (INHALATION) at 08:43

## 2019-05-05 RX ADMIN — TORSEMIDE 50 MG: 20 TABLET ORAL at 08:46

## 2019-05-05 RX ADMIN — TORSEMIDE 50 MG: 20 TABLET ORAL at 17:23

## 2019-05-05 RX ADMIN — LUBIPROSTONE 24 MCG: 24 CAPSULE, GELATIN COATED ORAL at 17:22

## 2019-05-05 RX ADMIN — GABAPENTIN 800 MG: 800 TABLET ORAL at 08:46

## 2019-05-05 RX ADMIN — ASPIRIN 81 MG 81 MG: 81 TABLET ORAL at 08:46

## 2019-05-05 RX ADMIN — GABAPENTIN 800 MG: 800 TABLET ORAL at 17:22

## 2019-05-05 RX ADMIN — INSULIN LISPRO 20 UNITS: 100 INJECTION, SOLUTION INTRAVENOUS; SUBCUTANEOUS at 08:44

## 2019-05-05 RX ADMIN — OXYCODONE HYDROCHLORIDE 30 MG: 10 TABLET ORAL at 11:41

## 2019-05-05 RX ADMIN — POLYETHYLENE GLYCOL 3350 17 G: 17 POWDER, FOR SOLUTION ORAL at 17:22

## 2019-05-05 RX ADMIN — POTASSIUM CHLORIDE 40 MEQ: 1500 TABLET, EXTENDED RELEASE ORAL at 17:23

## 2019-05-05 RX ADMIN — HEPARIN SODIUM 5000 UNITS: 5000 INJECTION INTRAVENOUS; SUBCUTANEOUS at 17:22

## 2019-05-05 RX ADMIN — TOPIRAMATE 100 MG: 25 TABLET, FILM COATED ORAL at 08:46

## 2019-05-05 RX ADMIN — BACLOFEN 10 MG: 10 TABLET ORAL at 08:47

## 2019-05-05 RX ADMIN — FOLIC ACID 1000 MCG: 1 TABLET ORAL at 08:47

## 2019-05-05 RX ADMIN — POTASSIUM CHLORIDE 40 MEQ: 1500 TABLET, EXTENDED RELEASE ORAL at 08:47

## 2019-05-05 RX ADMIN — RANOLAZINE 1000 MG: 500 TABLET, FILM COATED, EXTENDED RELEASE ORAL at 08:46

## 2019-05-05 RX ADMIN — PANTOPRAZOLE SODIUM 40 MG: 40 TABLET, DELAYED RELEASE ORAL at 05:16

## 2019-05-05 RX ADMIN — OXYCODONE HYDROCHLORIDE 30 MG: 10 TABLET ORAL at 03:14

## 2019-05-05 RX ADMIN — BACLOFEN 10 MG: 10 TABLET ORAL at 21:32

## 2019-05-05 RX ADMIN — MORPHINE SULFATE 60 MG: 30 TABLET, EXTENDED RELEASE ORAL at 08:46

## 2019-05-05 RX ADMIN — MORPHINE SULFATE 60 MG: 30 TABLET, EXTENDED RELEASE ORAL at 21:33

## 2019-05-05 RX ADMIN — POLYETHYLENE GLYCOL 3350 17 G: 17 POWDER, FOR SOLUTION ORAL at 08:42

## 2019-05-05 RX ADMIN — ATORVASTATIN CALCIUM 40 MG: 40 TABLET, FILM COATED ORAL at 17:23

## 2019-05-05 RX ADMIN — RANOLAZINE 1000 MG: 500 TABLET, FILM COATED, EXTENDED RELEASE ORAL at 17:23

## 2019-05-05 RX ADMIN — MEMANTINE 10 MG: 5 TABLET ORAL at 08:47

## 2019-05-05 RX ADMIN — INSULIN LISPRO 20 UNITS: 100 INJECTION, SOLUTION INTRAVENOUS; SUBCUTANEOUS at 17:22

## 2019-05-05 RX ADMIN — STANDARDIZED SENNA CONCENTRATE 8.6 MG: 8.6 TABLET ORAL at 21:33

## 2019-05-05 RX ADMIN — INSULIN LISPRO 20 UNITS: 100 INJECTION, SOLUTION INTRAVENOUS; SUBCUTANEOUS at 11:31

## 2019-05-05 RX ADMIN — HEPARIN SODIUM 5000 UNITS: 5000 INJECTION INTRAVENOUS; SUBCUTANEOUS at 23:28

## 2019-05-05 RX ADMIN — TRAZODONE HYDROCHLORIDE 100 MG: 100 TABLET ORAL at 23:34

## 2019-05-05 RX ADMIN — INSULIN LISPRO 1 UNITS: 100 INJECTION, SOLUTION INTRAVENOUS; SUBCUTANEOUS at 08:44

## 2019-05-05 RX ADMIN — POTASSIUM CHLORIDE 40 MEQ: 1500 TABLET, EXTENDED RELEASE ORAL at 11:31

## 2019-05-05 RX ADMIN — OXYCODONE HYDROCHLORIDE 30 MG: 10 TABLET ORAL at 19:10

## 2019-05-05 RX ADMIN — TOPIRAMATE 100 MG: 25 TABLET, FILM COATED ORAL at 17:22

## 2019-05-05 RX ADMIN — LUBIPROSTONE 24 MCG: 24 CAPSULE, GELATIN COATED ORAL at 08:46

## 2019-05-05 RX ADMIN — HEPARIN SODIUM 5000 UNITS: 5000 INJECTION INTRAVENOUS; SUBCUTANEOUS at 08:44

## 2019-05-05 RX ADMIN — INSULIN LISPRO 1 UNITS: 100 INJECTION, SOLUTION INTRAVENOUS; SUBCUTANEOUS at 11:31

## 2019-05-05 RX ADMIN — HEPARIN SODIUM 5000 UNITS: 5000 INJECTION INTRAVENOUS; SUBCUTANEOUS at 01:09

## 2019-05-05 RX ADMIN — CITALOPRAM HYDROBROMIDE 20 MG: 20 TABLET ORAL at 08:47

## 2019-05-06 ENCOUNTER — APPOINTMENT (INPATIENT)
Dept: CT IMAGING | Facility: HOSPITAL | Age: 59
DRG: 562 | End: 2019-05-06
Payer: MEDICARE

## 2019-05-06 PROBLEM — R65.10 SIRS (SYSTEMIC INFLAMMATORY RESPONSE SYNDROME) (HCC): Status: ACTIVE | Noted: 2019-05-06

## 2019-05-06 LAB
ANION GAP SERPL CALCULATED.3IONS-SCNC: 6 MMOL/L (ref 4–13)
APPEARANCE FLD: ABNORMAL
BASOPHILS # BLD AUTO: 0.06 THOUSANDS/ΜL (ref 0–0.1)
BASOPHILS NFR BLD AUTO: 0 % (ref 0–1)
BUN SERPL-MCNC: 23 MG/DL (ref 5–25)
CALCIUM SERPL-MCNC: 9.3 MG/DL (ref 8.3–10.1)
CHLORIDE SERPL-SCNC: 102 MMOL/L (ref 100–108)
CO2 SERPL-SCNC: 31 MMOL/L (ref 21–32)
COLOR FLD: ABNORMAL
CREAT SERPL-MCNC: 1.72 MG/DL (ref 0.6–1.3)
CRYSTALS SNV QL MICRO: NORMAL
EOSINOPHIL # BLD AUTO: 0.2 THOUSAND/ΜL (ref 0–0.61)
EOSINOPHIL NFR BLD AUTO: 1 % (ref 0–6)
ERYTHROCYTE [DISTWIDTH] IN BLOOD BY AUTOMATED COUNT: 13.8 % (ref 11.6–15.1)
GFR SERPL CREATININE-BSD FRML MDRD: 43 ML/MIN/1.73SQ M
GLUCOSE SERPL-MCNC: 107 MG/DL (ref 65–140)
GLUCOSE SERPL-MCNC: 107 MG/DL (ref 65–140)
GLUCOSE SERPL-MCNC: 111 MG/DL (ref 65–140)
GLUCOSE SERPL-MCNC: 133 MG/DL (ref 65–140)
GLUCOSE SERPL-MCNC: 78 MG/DL (ref 65–140)
HCT VFR BLD AUTO: 35.7 % (ref 36.5–49.3)
HGB BLD-MCNC: 11.3 G/DL (ref 12–17)
HISTIOCYTES NFR SNV MANUAL: 4 %
IMM GRANULOCYTES # BLD AUTO: 0.18 THOUSAND/UL (ref 0–0.2)
IMM GRANULOCYTES NFR BLD AUTO: 1 % (ref 0–2)
LYMPHOCYTES # BLD AUTO: 1.87 THOUSANDS/ΜL (ref 0.6–4.47)
LYMPHOCYTES # SNV MANUAL: 5 %
LYMPHOCYTES NFR BLD AUTO: 11 % (ref 14–44)
MCH RBC QN AUTO: 30.4 PG (ref 26.8–34.3)
MCHC RBC AUTO-ENTMCNC: 31.7 G/DL (ref 31.4–37.4)
MCV RBC AUTO: 96 FL (ref 82–98)
MONOCYTES # BLD AUTO: 1.03 THOUSAND/ΜL (ref 0.17–1.22)
MONOCYTES NFR BLD AUTO: 6 % (ref 4–12)
MONOCYTES NFR SNV MANUAL: 9 %
NEUTROPHILS # BLD AUTO: 13.6 THOUSANDS/ΜL (ref 1.85–7.62)
NEUTROPHILS NFR SNV MANUAL: 82 %
NEUTS SEG NFR BLD AUTO: 81 % (ref 43–75)
NRBC BLD AUTO-RTO: 0 /100 WBCS
PLATELET # BLD AUTO: 238 THOUSANDS/UL (ref 149–390)
PMV BLD AUTO: 10.9 FL (ref 8.9–12.7)
POTASSIUM SERPL-SCNC: 4.6 MMOL/L (ref 3.5–5.3)
RBC # BLD AUTO: 3.72 MILLION/UL (ref 3.88–5.62)
SITE: ABNORMAL
SODIUM SERPL-SCNC: 139 MMOL/L (ref 136–145)
TOTAL CELLS COUNTED SPEC: 100
WBC # BLD AUTO: 16.94 THOUSAND/UL (ref 4.31–10.16)
WBC # FLD MANUAL: ABNORMAL /UL (ref 0–200)

## 2019-05-06 PROCEDURE — 80048 BASIC METABOLIC PNL TOTAL CA: CPT | Performed by: FAMILY MEDICINE

## 2019-05-06 PROCEDURE — 89060 EXAM SYNOVIAL FLUID CRYSTALS: CPT | Performed by: PHYSICIAN ASSISTANT

## 2019-05-06 PROCEDURE — 99221 1ST HOSP IP/OBS SF/LOW 40: CPT | Performed by: PHYSICIAN ASSISTANT

## 2019-05-06 PROCEDURE — 87070 CULTURE OTHR SPECIMN AEROBIC: CPT | Performed by: PHYSICIAN ASSISTANT

## 2019-05-06 PROCEDURE — 73700 CT LOWER EXTREMITY W/O DYE: CPT

## 2019-05-06 PROCEDURE — 99232 SBSQ HOSP IP/OBS MODERATE 35: CPT | Performed by: FAMILY MEDICINE

## 2019-05-06 PROCEDURE — 20610 DRAIN/INJ JOINT/BURSA W/O US: CPT | Performed by: PHYSICIAN ASSISTANT

## 2019-05-06 PROCEDURE — 0S9D3ZX DRAINAGE OF LEFT KNEE JOINT, PERCUTANEOUS APPROACH, DIAGNOSTIC: ICD-10-PCS | Performed by: ORTHOPAEDIC SURGERY

## 2019-05-06 PROCEDURE — 85025 COMPLETE CBC W/AUTO DIFF WBC: CPT | Performed by: FAMILY MEDICINE

## 2019-05-06 PROCEDURE — 87205 SMEAR GRAM STAIN: CPT | Performed by: PHYSICIAN ASSISTANT

## 2019-05-06 PROCEDURE — 89051 BODY FLUID CELL COUNT: CPT | Performed by: PHYSICIAN ASSISTANT

## 2019-05-06 PROCEDURE — 82948 REAGENT STRIP/BLOOD GLUCOSE: CPT

## 2019-05-06 RX ORDER — LIDOCAINE HYDROCHLORIDE 20 MG/ML
5 INJECTION, SOLUTION EPIDURAL; INFILTRATION; INTRACAUDAL; PERINEURAL ONCE
Status: COMPLETED | OUTPATIENT
Start: 2019-05-06 | End: 2019-05-06

## 2019-05-06 RX ADMIN — LIDOCAINE HYDROCHLORIDE 5 ML: 20 INJECTION, SOLUTION EPIDURAL; INFILTRATION; INTRACAUDAL; PERINEURAL at 09:45

## 2019-05-06 RX ADMIN — TOPIRAMATE 100 MG: 25 TABLET, FILM COATED ORAL at 17:44

## 2019-05-06 RX ADMIN — BACLOFEN 10 MG: 10 TABLET ORAL at 16:40

## 2019-05-06 RX ADMIN — GABAPENTIN 800 MG: 800 TABLET ORAL at 08:30

## 2019-05-06 RX ADMIN — ASPIRIN 81 MG 81 MG: 81 TABLET ORAL at 08:30

## 2019-05-06 RX ADMIN — CITALOPRAM HYDROBROMIDE 20 MG: 20 TABLET ORAL at 08:30

## 2019-05-06 RX ADMIN — BACLOFEN 10 MG: 10 TABLET ORAL at 21:53

## 2019-05-06 RX ADMIN — POTASSIUM CHLORIDE 40 MEQ: 1500 TABLET, EXTENDED RELEASE ORAL at 08:29

## 2019-05-06 RX ADMIN — HEPARIN SODIUM 5000 UNITS: 5000 INJECTION INTRAVENOUS; SUBCUTANEOUS at 23:43

## 2019-05-06 RX ADMIN — ATORVASTATIN CALCIUM 40 MG: 40 TABLET, FILM COATED ORAL at 16:40

## 2019-05-06 RX ADMIN — FLUTICASONE FUROATE AND VILANTEROL TRIFENATATE 1 PUFF: 200; 25 POWDER RESPIRATORY (INHALATION) at 08:31

## 2019-05-06 RX ADMIN — PANTOPRAZOLE SODIUM 40 MG: 40 TABLET, DELAYED RELEASE ORAL at 05:03

## 2019-05-06 RX ADMIN — MORPHINE SULFATE 60 MG: 30 TABLET, EXTENDED RELEASE ORAL at 08:30

## 2019-05-06 RX ADMIN — TOPIRAMATE 100 MG: 25 TABLET, FILM COATED ORAL at 08:31

## 2019-05-06 RX ADMIN — PROCHLORPERAZINE MALEATE 10 MG: 10 TABLET, FILM COATED ORAL at 10:00

## 2019-05-06 RX ADMIN — GABAPENTIN 800 MG: 800 TABLET ORAL at 17:43

## 2019-05-06 RX ADMIN — OXYCODONE HYDROCHLORIDE 30 MG: 10 TABLET ORAL at 20:14

## 2019-05-06 RX ADMIN — BACLOFEN 10 MG: 10 TABLET ORAL at 08:30

## 2019-05-06 RX ADMIN — HEPARIN SODIUM 5000 UNITS: 5000 INJECTION INTRAVENOUS; SUBCUTANEOUS at 08:28

## 2019-05-06 RX ADMIN — RANOLAZINE 1000 MG: 500 TABLET, FILM COATED, EXTENDED RELEASE ORAL at 08:29

## 2019-05-06 RX ADMIN — HEPARIN SODIUM 5000 UNITS: 5000 INJECTION INTRAVENOUS; SUBCUTANEOUS at 16:40

## 2019-05-06 RX ADMIN — TORSEMIDE 50 MG: 20 TABLET ORAL at 08:29

## 2019-05-06 RX ADMIN — MORPHINE SULFATE 60 MG: 30 TABLET, EXTENDED RELEASE ORAL at 21:53

## 2019-05-06 RX ADMIN — LUBIPROSTONE 24 MCG: 24 CAPSULE, GELATIN COATED ORAL at 17:44

## 2019-05-06 RX ADMIN — LUBIPROSTONE 24 MCG: 24 CAPSULE, GELATIN COATED ORAL at 08:30

## 2019-05-06 RX ADMIN — POLYETHYLENE GLYCOL 3350 17 G: 17 POWDER, FOR SOLUTION ORAL at 08:31

## 2019-05-06 RX ADMIN — POTASSIUM CHLORIDE 40 MEQ: 1500 TABLET, EXTENDED RELEASE ORAL at 12:29

## 2019-05-06 RX ADMIN — FOLIC ACID 1000 MCG: 1 TABLET ORAL at 08:30

## 2019-05-06 RX ADMIN — RANOLAZINE 1000 MG: 500 TABLET, FILM COATED, EXTENDED RELEASE ORAL at 17:44

## 2019-05-06 RX ADMIN — INSULIN LISPRO 20 UNITS: 100 INJECTION, SOLUTION INTRAVENOUS; SUBCUTANEOUS at 17:43

## 2019-05-06 RX ADMIN — STANDARDIZED SENNA CONCENTRATE 8.6 MG: 8.6 TABLET ORAL at 21:53

## 2019-05-06 RX ADMIN — INSULIN LISPRO 20 UNITS: 100 INJECTION, SOLUTION INTRAVENOUS; SUBCUTANEOUS at 12:29

## 2019-05-06 RX ADMIN — MEMANTINE 10 MG: 5 TABLET ORAL at 08:29

## 2019-05-06 RX ADMIN — INSULIN LISPRO 20 UNITS: 100 INJECTION, SOLUTION INTRAVENOUS; SUBCUTANEOUS at 08:27

## 2019-05-06 RX ADMIN — OXYCODONE HYDROCHLORIDE 30 MG: 10 TABLET ORAL at 10:31

## 2019-05-07 LAB
ANION GAP SERPL CALCULATED.3IONS-SCNC: 7 MMOL/L (ref 4–13)
BUN SERPL-MCNC: 24 MG/DL (ref 5–25)
CALCIUM SERPL-MCNC: 9.3 MG/DL (ref 8.3–10.1)
CHLORIDE SERPL-SCNC: 104 MMOL/L (ref 100–108)
CO2 SERPL-SCNC: 29 MMOL/L (ref 21–32)
CREAT SERPL-MCNC: 1.66 MG/DL (ref 0.6–1.3)
GFR SERPL CREATININE-BSD FRML MDRD: 45 ML/MIN/1.73SQ M
GLUCOSE SERPL-MCNC: 103 MG/DL (ref 65–140)
GLUCOSE SERPL-MCNC: 109 MG/DL (ref 65–140)
GLUCOSE SERPL-MCNC: 125 MG/DL (ref 65–140)
GLUCOSE SERPL-MCNC: 69 MG/DL (ref 65–140)
GLUCOSE SERPL-MCNC: 79 MG/DL (ref 65–140)
GLUCOSE SERPL-MCNC: 93 MG/DL (ref 65–140)
POTASSIUM SERPL-SCNC: 4.2 MMOL/L (ref 3.5–5.3)
SODIUM SERPL-SCNC: 140 MMOL/L (ref 136–145)

## 2019-05-07 PROCEDURE — 80048 BASIC METABOLIC PNL TOTAL CA: CPT | Performed by: FAMILY MEDICINE

## 2019-05-07 PROCEDURE — 97163 PT EVAL HIGH COMPLEX 45 MIN: CPT

## 2019-05-07 PROCEDURE — G8982 BODY POS GOAL STATUS: HCPCS

## 2019-05-07 PROCEDURE — 99231 SBSQ HOSP IP/OBS SF/LOW 25: CPT | Performed by: PHYSICIAN ASSISTANT

## 2019-05-07 PROCEDURE — G8981 BODY POS CURRENT STATUS: HCPCS

## 2019-05-07 PROCEDURE — G8988 SELF CARE GOAL STATUS: HCPCS

## 2019-05-07 PROCEDURE — 99232 SBSQ HOSP IP/OBS MODERATE 35: CPT | Performed by: INTERNAL MEDICINE

## 2019-05-07 PROCEDURE — 97167 OT EVAL HIGH COMPLEX 60 MIN: CPT

## 2019-05-07 PROCEDURE — G8987 SELF CARE CURRENT STATUS: HCPCS

## 2019-05-07 PROCEDURE — 82948 REAGENT STRIP/BLOOD GLUCOSE: CPT

## 2019-05-07 RX ORDER — POTASSIUM CHLORIDE 20 MEQ/1
20 TABLET, EXTENDED RELEASE ORAL
Status: DISCONTINUED | OUTPATIENT
Start: 2019-05-07 | End: 2019-05-09 | Stop reason: HOSPADM

## 2019-05-07 RX ADMIN — MORPHINE SULFATE 60 MG: 30 TABLET, EXTENDED RELEASE ORAL at 21:30

## 2019-05-07 RX ADMIN — ATORVASTATIN CALCIUM 40 MG: 40 TABLET, FILM COATED ORAL at 16:50

## 2019-05-07 RX ADMIN — HEPARIN SODIUM 5000 UNITS: 5000 INJECTION INTRAVENOUS; SUBCUTANEOUS at 08:46

## 2019-05-07 RX ADMIN — PANTOPRAZOLE SODIUM 40 MG: 40 TABLET, DELAYED RELEASE ORAL at 05:20

## 2019-05-07 RX ADMIN — INSULIN LISPRO 20 UNITS: 100 INJECTION, SOLUTION INTRAVENOUS; SUBCUTANEOUS at 17:43

## 2019-05-07 RX ADMIN — TORSEMIDE 50 MG: 20 TABLET ORAL at 16:50

## 2019-05-07 RX ADMIN — MORPHINE SULFATE 60 MG: 30 TABLET, EXTENDED RELEASE ORAL at 08:49

## 2019-05-07 RX ADMIN — HEPARIN SODIUM 5000 UNITS: 5000 INJECTION INTRAVENOUS; SUBCUTANEOUS at 23:49

## 2019-05-07 RX ADMIN — GABAPENTIN 800 MG: 800 TABLET ORAL at 08:50

## 2019-05-07 RX ADMIN — POTASSIUM CHLORIDE 20 MEQ: 1500 TABLET, EXTENDED RELEASE ORAL at 12:45

## 2019-05-07 RX ADMIN — POTASSIUM CHLORIDE 20 MEQ: 1500 TABLET, EXTENDED RELEASE ORAL at 17:44

## 2019-05-07 RX ADMIN — HEPARIN SODIUM 5000 UNITS: 5000 INJECTION INTRAVENOUS; SUBCUTANEOUS at 16:50

## 2019-05-07 RX ADMIN — TORSEMIDE 50 MG: 20 TABLET ORAL at 08:48

## 2019-05-07 RX ADMIN — BACLOFEN 10 MG: 10 TABLET ORAL at 08:50

## 2019-05-07 RX ADMIN — CITALOPRAM HYDROBROMIDE 20 MG: 20 TABLET ORAL at 08:49

## 2019-05-07 RX ADMIN — RANOLAZINE 1000 MG: 500 TABLET, FILM COATED, EXTENDED RELEASE ORAL at 17:44

## 2019-05-07 RX ADMIN — MEMANTINE 10 MG: 5 TABLET ORAL at 08:49

## 2019-05-07 RX ADMIN — POTASSIUM CHLORIDE 40 MEQ: 1500 TABLET, EXTENDED RELEASE ORAL at 08:50

## 2019-05-07 RX ADMIN — FLUTICASONE FUROATE AND VILANTEROL TRIFENATATE 1 PUFF: 200; 25 POWDER RESPIRATORY (INHALATION) at 08:46

## 2019-05-07 RX ADMIN — LUBIPROSTONE 24 MCG: 24 CAPSULE, GELATIN COATED ORAL at 08:50

## 2019-05-07 RX ADMIN — STANDARDIZED SENNA CONCENTRATE 8.6 MG: 8.6 TABLET ORAL at 21:31

## 2019-05-07 RX ADMIN — OXYCODONE HYDROCHLORIDE 30 MG: 10 TABLET ORAL at 05:46

## 2019-05-07 RX ADMIN — TOPIRAMATE 100 MG: 25 TABLET, FILM COATED ORAL at 08:49

## 2019-05-07 RX ADMIN — BACLOFEN 10 MG: 10 TABLET ORAL at 16:50

## 2019-05-07 RX ADMIN — RANOLAZINE 1000 MG: 500 TABLET, FILM COATED, EXTENDED RELEASE ORAL at 08:50

## 2019-05-07 RX ADMIN — INSULIN LISPRO 20 UNITS: 100 INJECTION, SOLUTION INTRAVENOUS; SUBCUTANEOUS at 12:45

## 2019-05-07 RX ADMIN — ASPIRIN 81 MG 81 MG: 81 TABLET ORAL at 08:50

## 2019-05-07 RX ADMIN — FOLIC ACID 1000 MCG: 1 TABLET ORAL at 08:49

## 2019-05-07 RX ADMIN — TOPIRAMATE 100 MG: 25 TABLET, FILM COATED ORAL at 17:44

## 2019-05-07 RX ADMIN — PROCHLORPERAZINE MALEATE 10 MG: 10 TABLET, FILM COATED ORAL at 11:18

## 2019-05-07 RX ADMIN — INSULIN LISPRO 20 UNITS: 100 INJECTION, SOLUTION INTRAVENOUS; SUBCUTANEOUS at 08:51

## 2019-05-07 RX ADMIN — LUBIPROSTONE 24 MCG: 24 CAPSULE, GELATIN COATED ORAL at 17:44

## 2019-05-07 RX ADMIN — POLYETHYLENE GLYCOL 3350 17 G: 17 POWDER, FOR SOLUTION ORAL at 08:47

## 2019-05-07 RX ADMIN — GABAPENTIN 800 MG: 800 TABLET ORAL at 17:43

## 2019-05-07 RX ADMIN — OXYCODONE HYDROCHLORIDE 30 MG: 10 TABLET ORAL at 18:51

## 2019-05-07 RX ADMIN — OXYCODONE HYDROCHLORIDE 30 MG: 10 TABLET ORAL at 12:24

## 2019-05-07 RX ADMIN — BACLOFEN 10 MG: 10 TABLET ORAL at 21:31

## 2019-05-08 ENCOUNTER — DOCUMENTATION (OUTPATIENT)
Dept: NEUROLOGY | Facility: CLINIC | Age: 59
End: 2019-05-08

## 2019-05-08 LAB
ANION GAP SERPL CALCULATED.3IONS-SCNC: 7 MMOL/L (ref 4–13)
BASOPHILS # BLD AUTO: 0.05 THOUSANDS/ΜL (ref 0–0.1)
BASOPHILS NFR BLD AUTO: 0 % (ref 0–1)
BUN SERPL-MCNC: 25 MG/DL (ref 5–25)
CALCIUM SERPL-MCNC: 9.2 MG/DL (ref 8.3–10.1)
CHLORIDE SERPL-SCNC: 105 MMOL/L (ref 100–108)
CO2 SERPL-SCNC: 28 MMOL/L (ref 21–32)
CREAT SERPL-MCNC: 1.62 MG/DL (ref 0.6–1.3)
EOSINOPHIL # BLD AUTO: 0.28 THOUSAND/ΜL (ref 0–0.61)
EOSINOPHIL NFR BLD AUTO: 2 % (ref 0–6)
ERYTHROCYTE [DISTWIDTH] IN BLOOD BY AUTOMATED COUNT: 13.9 % (ref 11.6–15.1)
GFR SERPL CREATININE-BSD FRML MDRD: 47 ML/MIN/1.73SQ M
GLUCOSE SERPL-MCNC: 106 MG/DL (ref 65–140)
GLUCOSE SERPL-MCNC: 124 MG/DL (ref 65–140)
GLUCOSE SERPL-MCNC: 143 MG/DL (ref 65–140)
GLUCOSE SERPL-MCNC: 86 MG/DL (ref 65–140)
GLUCOSE SERPL-MCNC: 98 MG/DL (ref 65–140)
HCT VFR BLD AUTO: 35.5 % (ref 36.5–49.3)
HGB BLD-MCNC: 11.2 G/DL (ref 12–17)
IMM GRANULOCYTES # BLD AUTO: 0.18 THOUSAND/UL (ref 0–0.2)
IMM GRANULOCYTES NFR BLD AUTO: 1 % (ref 0–2)
LYMPHOCYTES # BLD AUTO: 1.74 THOUSANDS/ΜL (ref 0.6–4.47)
LYMPHOCYTES NFR BLD AUTO: 14 % (ref 14–44)
MCH RBC QN AUTO: 30.4 PG (ref 26.8–34.3)
MCHC RBC AUTO-ENTMCNC: 31.5 G/DL (ref 31.4–37.4)
MCV RBC AUTO: 96 FL (ref 82–98)
MONOCYTES # BLD AUTO: 0.65 THOUSAND/ΜL (ref 0.17–1.22)
MONOCYTES NFR BLD AUTO: 5 % (ref 4–12)
NEUTROPHILS # BLD AUTO: 9.9 THOUSANDS/ΜL (ref 1.85–7.62)
NEUTS SEG NFR BLD AUTO: 78 % (ref 43–75)
NRBC BLD AUTO-RTO: 0 /100 WBCS
PLATELET # BLD AUTO: 252 THOUSANDS/UL (ref 149–390)
PMV BLD AUTO: 10.7 FL (ref 8.9–12.7)
POTASSIUM SERPL-SCNC: 4.3 MMOL/L (ref 3.5–5.3)
RBC # BLD AUTO: 3.69 MILLION/UL (ref 3.88–5.62)
SODIUM SERPL-SCNC: 140 MMOL/L (ref 136–145)
WBC # BLD AUTO: 12.8 THOUSAND/UL (ref 4.31–10.16)

## 2019-05-08 PROCEDURE — 97530 THERAPEUTIC ACTIVITIES: CPT

## 2019-05-08 PROCEDURE — 97116 GAIT TRAINING THERAPY: CPT

## 2019-05-08 PROCEDURE — 99232 SBSQ HOSP IP/OBS MODERATE 35: CPT | Performed by: INTERNAL MEDICINE

## 2019-05-08 PROCEDURE — 97760 ORTHOTIC MGMT&TRAING 1ST ENC: CPT

## 2019-05-08 PROCEDURE — 82948 REAGENT STRIP/BLOOD GLUCOSE: CPT

## 2019-05-08 PROCEDURE — 97110 THERAPEUTIC EXERCISES: CPT

## 2019-05-08 PROCEDURE — 80048 BASIC METABOLIC PNL TOTAL CA: CPT | Performed by: INTERNAL MEDICINE

## 2019-05-08 PROCEDURE — 99231 SBSQ HOSP IP/OBS SF/LOW 25: CPT | Performed by: PHYSICIAN ASSISTANT

## 2019-05-08 PROCEDURE — 85025 COMPLETE CBC W/AUTO DIFF WBC: CPT | Performed by: INTERNAL MEDICINE

## 2019-05-08 RX ADMIN — TOPIRAMATE 100 MG: 25 TABLET, FILM COATED ORAL at 17:21

## 2019-05-08 RX ADMIN — POLYETHYLENE GLYCOL 3350 17 G: 17 POWDER, FOR SOLUTION ORAL at 08:15

## 2019-05-08 RX ADMIN — LUBIPROSTONE 24 MCG: 24 CAPSULE, GELATIN COATED ORAL at 08:21

## 2019-05-08 RX ADMIN — PANTOPRAZOLE SODIUM 40 MG: 40 TABLET, DELAYED RELEASE ORAL at 05:33

## 2019-05-08 RX ADMIN — HEPARIN SODIUM 5000 UNITS: 5000 INJECTION INTRAVENOUS; SUBCUTANEOUS at 08:21

## 2019-05-08 RX ADMIN — MORPHINE SULFATE 60 MG: 30 TABLET, EXTENDED RELEASE ORAL at 21:10

## 2019-05-08 RX ADMIN — PROCHLORPERAZINE MALEATE 10 MG: 10 TABLET, FILM COATED ORAL at 18:58

## 2019-05-08 RX ADMIN — TORSEMIDE 50 MG: 20 TABLET ORAL at 17:21

## 2019-05-08 RX ADMIN — TRAZODONE HYDROCHLORIDE 100 MG: 100 TABLET ORAL at 23:16

## 2019-05-08 RX ADMIN — OXYCODONE HYDROCHLORIDE 30 MG: 10 TABLET ORAL at 18:23

## 2019-05-08 RX ADMIN — GABAPENTIN 800 MG: 800 TABLET ORAL at 17:22

## 2019-05-08 RX ADMIN — INSULIN LISPRO 20 UNITS: 100 INJECTION, SOLUTION INTRAVENOUS; SUBCUTANEOUS at 17:27

## 2019-05-08 RX ADMIN — BACLOFEN 10 MG: 10 TABLET ORAL at 21:10

## 2019-05-08 RX ADMIN — TOPIRAMATE 100 MG: 25 TABLET, FILM COATED ORAL at 08:22

## 2019-05-08 RX ADMIN — RANOLAZINE 1000 MG: 500 TABLET, FILM COATED, EXTENDED RELEASE ORAL at 17:21

## 2019-05-08 RX ADMIN — POLYETHYLENE GLYCOL 3350 17 G: 17 POWDER, FOR SOLUTION ORAL at 17:16

## 2019-05-08 RX ADMIN — CITALOPRAM HYDROBROMIDE 20 MG: 20 TABLET ORAL at 08:25

## 2019-05-08 RX ADMIN — POTASSIUM CHLORIDE 20 MEQ: 1500 TABLET, EXTENDED RELEASE ORAL at 12:22

## 2019-05-08 RX ADMIN — POTASSIUM CHLORIDE 20 MEQ: 1500 TABLET, EXTENDED RELEASE ORAL at 08:22

## 2019-05-08 RX ADMIN — POTASSIUM CHLORIDE 20 MEQ: 1500 TABLET, EXTENDED RELEASE ORAL at 17:22

## 2019-05-08 RX ADMIN — BACLOFEN 10 MG: 10 TABLET ORAL at 08:22

## 2019-05-08 RX ADMIN — INSULIN LISPRO 20 UNITS: 100 INJECTION, SOLUTION INTRAVENOUS; SUBCUTANEOUS at 12:01

## 2019-05-08 RX ADMIN — HEPARIN SODIUM 5000 UNITS: 5000 INJECTION INTRAVENOUS; SUBCUTANEOUS at 23:17

## 2019-05-08 RX ADMIN — ASPIRIN 81 MG 81 MG: 81 TABLET ORAL at 08:25

## 2019-05-08 RX ADMIN — INSULIN LISPRO 20 UNITS: 100 INJECTION, SOLUTION INTRAVENOUS; SUBCUTANEOUS at 08:43

## 2019-05-08 RX ADMIN — TORSEMIDE 50 MG: 20 TABLET ORAL at 08:21

## 2019-05-08 RX ADMIN — OXYCODONE HYDROCHLORIDE 30 MG: 10 TABLET ORAL at 06:20

## 2019-05-08 RX ADMIN — MEMANTINE 10 MG: 5 TABLET ORAL at 08:22

## 2019-05-08 RX ADMIN — RANOLAZINE 1000 MG: 500 TABLET, FILM COATED, EXTENDED RELEASE ORAL at 08:22

## 2019-05-08 RX ADMIN — GABAPENTIN 800 MG: 800 TABLET ORAL at 08:21

## 2019-05-08 RX ADMIN — OXYCODONE HYDROCHLORIDE 30 MG: 10 TABLET ORAL at 12:22

## 2019-05-08 RX ADMIN — FOLIC ACID 1000 MCG: 1 TABLET ORAL at 08:25

## 2019-05-08 RX ADMIN — HEPARIN SODIUM 5000 UNITS: 5000 INJECTION INTRAVENOUS; SUBCUTANEOUS at 17:17

## 2019-05-08 RX ADMIN — MORPHINE SULFATE 60 MG: 30 TABLET, EXTENDED RELEASE ORAL at 08:23

## 2019-05-08 RX ADMIN — BACLOFEN 10 MG: 10 TABLET ORAL at 17:21

## 2019-05-08 RX ADMIN — STANDARDIZED SENNA CONCENTRATE 8.6 MG: 8.6 TABLET ORAL at 21:10

## 2019-05-08 RX ADMIN — LUBIPROSTONE 24 MCG: 24 CAPSULE, GELATIN COATED ORAL at 17:22

## 2019-05-08 RX ADMIN — FLUTICASONE FUROATE AND VILANTEROL TRIFENATATE 1 PUFF: 200; 25 POWDER RESPIRATORY (INHALATION) at 08:09

## 2019-05-08 RX ADMIN — ATORVASTATIN CALCIUM 40 MG: 40 TABLET, FILM COATED ORAL at 17:29

## 2019-05-09 VITALS
WEIGHT: 260.14 LBS | RESPIRATION RATE: 18 BRPM | OXYGEN SATURATION: 97 % | HEART RATE: 68 BPM | TEMPERATURE: 97.2 F | BODY MASS INDEX: 34.32 KG/M2 | DIASTOLIC BLOOD PRESSURE: 88 MMHG | SYSTOLIC BLOOD PRESSURE: 132 MMHG

## 2019-05-09 PROBLEM — R65.10 SIRS (SYSTEMIC INFLAMMATORY RESPONSE SYNDROME) (HCC): Status: RESOLVED | Noted: 2019-05-06 | Resolved: 2019-05-09

## 2019-05-09 LAB
BACTERIA SPEC BFLD CULT: NO GROWTH
GLUCOSE SERPL-MCNC: 101 MG/DL (ref 65–140)
GLUCOSE SERPL-MCNC: 103 MG/DL (ref 65–140)
GLUCOSE SERPL-MCNC: 93 MG/DL (ref 65–140)
GRAM STN SPEC: NORMAL
GRAM STN SPEC: NORMAL

## 2019-05-09 PROCEDURE — 99239 HOSP IP/OBS DSCHRG MGMT >30: CPT | Performed by: INTERNAL MEDICINE

## 2019-05-09 PROCEDURE — 82948 REAGENT STRIP/BLOOD GLUCOSE: CPT

## 2019-05-09 RX ORDER — OXYCODONE HYDROCHLORIDE 30 MG/1
30 TABLET ORAL EVERY 6 HOURS PRN
Qty: 12 TABLET | Refills: 0 | Status: ON HOLD | OUTPATIENT
Start: 2019-05-09 | End: 2020-01-31 | Stop reason: CLARIF

## 2019-05-09 RX ORDER — GABAPENTIN 800 MG/1
800 TABLET ORAL 2 TIMES DAILY
Qty: 6 TABLET | Refills: 0 | Status: ON HOLD | OUTPATIENT
Start: 2019-05-09 | End: 2020-01-31 | Stop reason: DRUGHIGH

## 2019-05-09 RX ORDER — MORPHINE SULFATE 100 MG/1
100 TABLET ORAL 2 TIMES DAILY
Qty: 6 TABLET | Refills: 0 | Status: SHIPPED | OUTPATIENT
Start: 2019-05-09 | End: 2019-05-09 | Stop reason: HOSPADM

## 2019-05-09 RX ORDER — TRAZODONE HYDROCHLORIDE 100 MG/1
100 TABLET ORAL
Qty: 4 TABLET | Refills: 0 | Status: SHIPPED | OUTPATIENT
Start: 2019-05-09 | End: 2020-01-31 | Stop reason: HOSPADM

## 2019-05-09 RX ORDER — MORPHINE SULFATE 60 MG/1
60 TABLET, FILM COATED, EXTENDED RELEASE ORAL EVERY 12 HOURS SCHEDULED
Qty: 6 TABLET | Refills: 0 | Status: ON HOLD | OUTPATIENT
Start: 2019-05-09 | End: 2020-01-31 | Stop reason: ALTCHOICE

## 2019-05-09 RX ADMIN — GABAPENTIN 800 MG: 800 TABLET ORAL at 08:34

## 2019-05-09 RX ADMIN — LUBIPROSTONE 24 MCG: 24 CAPSULE, GELATIN COATED ORAL at 07:33

## 2019-05-09 RX ADMIN — BACLOFEN 10 MG: 10 TABLET ORAL at 08:29

## 2019-05-09 RX ADMIN — OXYCODONE HYDROCHLORIDE 30 MG: 10 TABLET ORAL at 06:30

## 2019-05-09 RX ADMIN — MEMANTINE 10 MG: 5 TABLET ORAL at 08:33

## 2019-05-09 RX ADMIN — RANOLAZINE 1000 MG: 500 TABLET, FILM COATED, EXTENDED RELEASE ORAL at 08:30

## 2019-05-09 RX ADMIN — PANTOPRAZOLE SODIUM 40 MG: 40 TABLET, DELAYED RELEASE ORAL at 05:18

## 2019-05-09 RX ADMIN — CITALOPRAM HYDROBROMIDE 20 MG: 20 TABLET ORAL at 08:29

## 2019-05-09 RX ADMIN — OXYCODONE HYDROCHLORIDE 30 MG: 10 TABLET ORAL at 12:26

## 2019-05-09 RX ADMIN — POTASSIUM CHLORIDE 20 MEQ: 1500 TABLET, EXTENDED RELEASE ORAL at 07:33

## 2019-05-09 RX ADMIN — MORPHINE SULFATE 60 MG: 30 TABLET, EXTENDED RELEASE ORAL at 08:29

## 2019-05-09 RX ADMIN — POTASSIUM CHLORIDE 20 MEQ: 1500 TABLET, EXTENDED RELEASE ORAL at 16:32

## 2019-05-09 RX ADMIN — ASPIRIN 81 MG 81 MG: 81 TABLET ORAL at 08:29

## 2019-05-09 RX ADMIN — ATORVASTATIN CALCIUM 40 MG: 40 TABLET, FILM COATED ORAL at 16:32

## 2019-05-09 RX ADMIN — POTASSIUM CHLORIDE 20 MEQ: 1500 TABLET, EXTENDED RELEASE ORAL at 12:26

## 2019-05-09 RX ADMIN — INSULIN LISPRO 20 UNITS: 100 INJECTION, SOLUTION INTRAVENOUS; SUBCUTANEOUS at 16:31

## 2019-05-09 RX ADMIN — HEPARIN SODIUM 5000 UNITS: 5000 INJECTION INTRAVENOUS; SUBCUTANEOUS at 07:33

## 2019-05-09 RX ADMIN — INSULIN LISPRO 20 UNITS: 100 INJECTION, SOLUTION INTRAVENOUS; SUBCUTANEOUS at 12:26

## 2019-05-09 RX ADMIN — FLUTICASONE FUROATE AND VILANTEROL TRIFENATATE 1 PUFF: 200; 25 POWDER RESPIRATORY (INHALATION) at 07:33

## 2019-05-09 RX ADMIN — POLYETHYLENE GLYCOL 3350 17 G: 17 POWDER, FOR SOLUTION ORAL at 07:32

## 2019-05-09 RX ADMIN — INSULIN LISPRO 20 UNITS: 100 INJECTION, SOLUTION INTRAVENOUS; SUBCUTANEOUS at 09:17

## 2019-05-09 RX ADMIN — FOLIC ACID 1000 MCG: 1 TABLET ORAL at 08:29

## 2019-05-09 RX ADMIN — BACLOFEN 10 MG: 10 TABLET ORAL at 16:32

## 2019-05-09 RX ADMIN — LUBIPROSTONE 24 MCG: 24 CAPSULE, GELATIN COATED ORAL at 16:34

## 2019-05-09 RX ADMIN — TORSEMIDE 50 MG: 20 TABLET ORAL at 16:32

## 2019-05-09 RX ADMIN — TOPIRAMATE 100 MG: 25 TABLET, FILM COATED ORAL at 08:30

## 2019-05-10 ENCOUNTER — TELEPHONE (OUTPATIENT)
Dept: OBGYN CLINIC | Facility: HOSPITAL | Age: 59
End: 2019-05-10

## 2019-05-15 DIAGNOSIS — K21.9 GASTROESOPHAGEAL REFLUX DISEASE, ESOPHAGITIS PRESENCE NOT SPECIFIED: ICD-10-CM

## 2019-05-15 DIAGNOSIS — G43.709 CHRONIC MIGRAINE WITHOUT AURA WITHOUT STATUS MIGRAINOSUS, NOT INTRACTABLE: ICD-10-CM

## 2019-05-15 RX ORDER — TOPIRAMATE 100 MG/1
100 TABLET, FILM COATED ORAL 2 TIMES DAILY
Qty: 60 TABLET | Refills: 5 | Status: SHIPPED | OUTPATIENT
Start: 2019-05-15 | End: 2019-06-04 | Stop reason: ALTCHOICE

## 2019-05-16 RX ORDER — OMEPRAZOLE 40 MG/1
CAPSULE, DELAYED RELEASE ORAL
Qty: 90 CAPSULE | Refills: 0 | OUTPATIENT
Start: 2019-05-16

## 2019-05-21 ENCOUNTER — APPOINTMENT (OUTPATIENT)
Dept: RADIOLOGY | Facility: CLINIC | Age: 59
End: 2019-05-21
Payer: COMMERCIAL

## 2019-05-21 ENCOUNTER — OFFICE VISIT (OUTPATIENT)
Dept: OBGYN CLINIC | Facility: MEDICAL CENTER | Age: 59
End: 2019-05-21
Payer: MEDICARE

## 2019-05-21 VITALS
HEART RATE: 88 BPM | HEIGHT: 72 IN | SYSTOLIC BLOOD PRESSURE: 105 MMHG | BODY MASS INDEX: 34.54 KG/M2 | WEIGHT: 255 LBS | DIASTOLIC BLOOD PRESSURE: 69 MMHG

## 2019-05-21 DIAGNOSIS — Z01.89 ENCOUNTER FOR LOWER EXTREMITY COMPARISON IMAGING STUDY: ICD-10-CM

## 2019-05-21 DIAGNOSIS — M25.562 LEFT KNEE PAIN, UNSPECIFIED CHRONICITY: ICD-10-CM

## 2019-05-21 DIAGNOSIS — M23.92 INTERNAL DERANGEMENT OF LEFT KNEE: Primary | ICD-10-CM

## 2019-05-21 PROCEDURE — 99213 OFFICE O/P EST LOW 20 MIN: CPT | Performed by: ORTHOPAEDIC SURGERY

## 2019-05-21 PROCEDURE — 73562 X-RAY EXAM OF KNEE 3: CPT

## 2019-05-21 PROCEDURE — 73564 X-RAY EXAM KNEE 4 OR MORE: CPT

## 2019-05-23 NOTE — SOCIAL WORK
IMM Notice presented to the patient, patient signed the notice, & I faxed it to [facility]/ [facility]
Patient was IP on 5/12/17 and was opened with this CM  Patient reported nothing has changed since that admission and hospital stay  At that point in time: "CM met w/pt  at bedside to open  Pt  lives in a 2-story house w/his wife, Paco Andrew, and their children, all adult ages  There are 5 QUE  Pt  needs assistance w/showering & dressing, although his shower is on the 2nd floor and he recently bought a chair stair lift and is awaiting his sy brother to install it  Otherwise, he lives on 1st floor  His wife does the food shopping/meal prep  Pt  has a rollator, a cane, and a wheelchair  Pt  has had [facility] services in the past  Pt  does drive; his car is in the parking garage that he will use upon d/c  Pt  identified his wife as his "Designated Caregiver " Pt 's pharmacy is [facility]  Pt  is on SSD  Pt 's PCP is Dr Melvin  Pt  does not have a legal, binding POA/Advanced Directive drawn up but indicated his wife would speak for his care   No needs at present "
Yes

## 2019-05-31 ENCOUNTER — TRANSCRIBE ORDERS (OUTPATIENT)
Dept: ADMINISTRATIVE | Facility: HOSPITAL | Age: 59
End: 2019-05-31

## 2019-05-31 ENCOUNTER — APPOINTMENT (OUTPATIENT)
Dept: LAB | Facility: HOSPITAL | Age: 59
End: 2019-05-31
Attending: FAMILY MEDICINE
Payer: MEDICARE

## 2019-05-31 ENCOUNTER — APPOINTMENT (OUTPATIENT)
Dept: WOUND CARE | Facility: HOSPITAL | Age: 59
End: 2019-05-31
Payer: MEDICARE

## 2019-05-31 ENCOUNTER — HOSPITAL ENCOUNTER (OUTPATIENT)
Dept: RADIOLOGY | Facility: HOSPITAL | Age: 59
Discharge: HOME | End: 2019-05-31
Attending: FAMILY MEDICINE
Payer: MEDICARE

## 2019-05-31 DIAGNOSIS — L89.154 STAGE IV PRESSURE ULCER OF SACRAL REGION (HCC): Primary | ICD-10-CM

## 2019-05-31 DIAGNOSIS — L89.154 STAGE IV PRESSURE ULCER OF SACRAL REGION (HCC): ICD-10-CM

## 2019-05-31 PROCEDURE — 99213 OFFICE O/P EST LOW 20 MIN: CPT | Performed by: FAMILY MEDICINE

## 2019-05-31 PROCEDURE — 87185 SC STD ENZYME DETCJ PER NZM: CPT

## 2019-05-31 PROCEDURE — 87076 CULTURE ANAEROBE IDENT EACH: CPT

## 2019-05-31 PROCEDURE — 87075 CULTR BACTERIA EXCEPT BLOOD: CPT

## 2019-05-31 PROCEDURE — 87070 CULTURE OTHR SPECIMN AEROBIC: CPT

## 2019-05-31 PROCEDURE — 87186 SC STD MICRODIL/AGAR DIL: CPT

## 2019-05-31 PROCEDURE — 87205 SMEAR GRAM STAIN: CPT

## 2019-05-31 PROCEDURE — 72220 X-RAY EXAM SACRUM TAILBONE: CPT

## 2019-06-02 LAB
BACTERIA SPEC ANAEROBE CULT: ABNORMAL
BACTERIA SPEC ANAEROBE CULT: ABNORMAL
BACTERIA WND AEROBE CULT: ABNORMAL
BACTERIA WND AEROBE CULT: ABNORMAL
GRAM STN SPEC: ABNORMAL

## 2019-06-03 ENCOUNTER — APPOINTMENT (OUTPATIENT)
Dept: WOUND CARE | Facility: HOSPITAL | Age: 59
End: 2019-06-03
Payer: MEDICARE

## 2019-06-03 ENCOUNTER — PREP FOR PROCEDURE (OUTPATIENT)
Dept: SURGERY | Facility: HOSPITAL | Age: 59
End: 2019-06-03

## 2019-06-03 DIAGNOSIS — L89.159 PRESSURE INJURY OF SKIN OF SACRAL REGION, UNSPECIFIED INJURY STAGE: Primary | ICD-10-CM

## 2019-06-03 PROCEDURE — 11042 DBRDMT SUBQ TIS 1ST 20SQCM/<: CPT | Performed by: SURGERY

## 2019-06-04 RX ORDER — MULTIVITAMIN
1 TABLET ORAL DAILY
COMMUNITY
End: 2020-01-31 | Stop reason: HOSPADM

## 2019-06-05 ENCOUNTER — ANESTHESIA EVENT (OUTPATIENT)
Dept: PERIOP | Facility: HOSPITAL | Age: 59
End: 2019-06-05
Payer: MEDICARE

## 2019-06-06 ENCOUNTER — ANESTHESIA (OUTPATIENT)
Dept: PERIOP | Facility: HOSPITAL | Age: 59
End: 2019-06-06
Payer: MEDICARE

## 2019-06-06 ENCOUNTER — HOSPITAL ENCOUNTER (OUTPATIENT)
Facility: HOSPITAL | Age: 59
Setting detail: OUTPATIENT SURGERY
Discharge: HOME | End: 2019-06-06
Attending: SURGERY | Admitting: SURGERY
Payer: MEDICARE

## 2019-06-06 VITALS
OXYGEN SATURATION: 98 % | RESPIRATION RATE: 18 BRPM | HEIGHT: 72 IN | HEART RATE: 64 BPM | DIASTOLIC BLOOD PRESSURE: 74 MMHG | SYSTOLIC BLOOD PRESSURE: 136 MMHG | WEIGHT: 256 LBS | TEMPERATURE: 98.2 F | BODY MASS INDEX: 34.67 KG/M2

## 2019-06-06 LAB
GLUCOSE SERPL-MCNC: 107 MG/DL (ref 65–140)
GLUCOSE SERPL-MCNC: 85 MG/DL (ref 65–140)

## 2019-06-06 PROCEDURE — 11043 DBRDMT MUSC&/FSCA 1ST 20/<: CPT | Performed by: SURGERY

## 2019-06-06 PROCEDURE — 82948 REAGENT STRIP/BLOOD GLUCOSE: CPT

## 2019-06-06 PROCEDURE — 11046 DBRDMT MUSC&/FSCA EA ADDL: CPT | Performed by: SURGERY

## 2019-06-06 RX ORDER — ROCURONIUM BROMIDE 10 MG/ML
INJECTION, SOLUTION INTRAVENOUS AS NEEDED
Status: DISCONTINUED | OUTPATIENT
Start: 2019-06-06 | End: 2019-06-06 | Stop reason: SURG

## 2019-06-06 RX ORDER — MAGNESIUM HYDROXIDE 1200 MG/15ML
LIQUID ORAL AS NEEDED
Status: DISCONTINUED | OUTPATIENT
Start: 2019-06-06 | End: 2019-06-06 | Stop reason: HOSPADM

## 2019-06-06 RX ORDER — GLYCOPYRROLATE 0.2 MG/ML
INJECTION INTRAMUSCULAR; INTRAVENOUS AS NEEDED
Status: DISCONTINUED | OUTPATIENT
Start: 2019-06-06 | End: 2019-06-06 | Stop reason: SURG

## 2019-06-06 RX ORDER — PROPOFOL 10 MG/ML
INJECTION, EMULSION INTRAVENOUS AS NEEDED
Status: DISCONTINUED | OUTPATIENT
Start: 2019-06-06 | End: 2019-06-06 | Stop reason: SURG

## 2019-06-06 RX ORDER — CEFAZOLIN SODIUM 2 G/50ML
2000 SOLUTION INTRAVENOUS ONCE
Status: COMPLETED | OUTPATIENT
Start: 2019-06-06 | End: 2019-06-06

## 2019-06-06 RX ORDER — NEOSTIGMINE METHYLSULFATE 1 MG/ML
INJECTION INTRAVENOUS AS NEEDED
Status: DISCONTINUED | OUTPATIENT
Start: 2019-06-06 | End: 2019-06-06 | Stop reason: SURG

## 2019-06-06 RX ORDER — SODIUM CHLORIDE 9 MG/ML
INJECTION, SOLUTION INTRAVENOUS AS NEEDED
Status: DISCONTINUED | OUTPATIENT
Start: 2019-06-06 | End: 2019-06-06 | Stop reason: HOSPADM

## 2019-06-06 RX ORDER — FENTANYL CITRATE/PF 50 MCG/ML
25 SYRINGE (ML) INJECTION
Status: COMPLETED | OUTPATIENT
Start: 2019-06-06 | End: 2019-06-06

## 2019-06-06 RX ORDER — HYDROMORPHONE HCL/PF 1 MG/ML
SYRINGE (ML) INJECTION AS NEEDED
Status: DISCONTINUED | OUTPATIENT
Start: 2019-06-06 | End: 2019-06-06 | Stop reason: SURG

## 2019-06-06 RX ORDER — LIDOCAINE HYDROCHLORIDE 10 MG/ML
INJECTION, SOLUTION INFILTRATION; PERINEURAL AS NEEDED
Status: DISCONTINUED | OUTPATIENT
Start: 2019-06-06 | End: 2019-06-06 | Stop reason: SURG

## 2019-06-06 RX ORDER — ONDANSETRON 2 MG/ML
4 INJECTION INTRAMUSCULAR; INTRAVENOUS ONCE AS NEEDED
Status: DISCONTINUED | OUTPATIENT
Start: 2019-06-06 | End: 2019-06-06 | Stop reason: HOSPADM

## 2019-06-06 RX ORDER — MIDAZOLAM HYDROCHLORIDE 1 MG/ML
INJECTION INTRAMUSCULAR; INTRAVENOUS AS NEEDED
Status: DISCONTINUED | OUTPATIENT
Start: 2019-06-06 | End: 2019-06-06 | Stop reason: SURG

## 2019-06-06 RX ORDER — ONDANSETRON 2 MG/ML
INJECTION INTRAMUSCULAR; INTRAVENOUS AS NEEDED
Status: DISCONTINUED | OUTPATIENT
Start: 2019-06-06 | End: 2019-06-06 | Stop reason: SURG

## 2019-06-06 RX ORDER — SODIUM CHLORIDE 9 MG/ML
125 INJECTION, SOLUTION INTRAVENOUS CONTINUOUS
Status: DISCONTINUED | OUTPATIENT
Start: 2019-06-06 | End: 2019-06-06 | Stop reason: HOSPADM

## 2019-06-06 RX ORDER — FENTANYL CITRATE 50 UG/ML
INJECTION, SOLUTION INTRAMUSCULAR; INTRAVENOUS AS NEEDED
Status: DISCONTINUED | OUTPATIENT
Start: 2019-06-06 | End: 2019-06-06 | Stop reason: SURG

## 2019-06-06 RX ADMIN — ROCURONIUM BROMIDE 30 MG: 10 INJECTION, SOLUTION INTRAVENOUS at 16:44

## 2019-06-06 RX ADMIN — ONDANSETRON HYDROCHLORIDE 4 MG: 2 INJECTION, SOLUTION INTRAVENOUS at 17:04

## 2019-06-06 RX ADMIN — HYDROMORPHONE HYDROCHLORIDE 0.5 MG: 1 INJECTION, SOLUTION INTRAMUSCULAR; INTRAVENOUS; SUBCUTANEOUS at 16:58

## 2019-06-06 RX ADMIN — SODIUM CHLORIDE 125 ML/HR: 0.9 INJECTION, SOLUTION INTRAVENOUS at 16:05

## 2019-06-06 RX ADMIN — NEOSTIGMINE METHYLSULFATE 5 MG: 1 INJECTION, SOLUTION INTRAVENOUS at 17:17

## 2019-06-06 RX ADMIN — PROPOFOL 250 MG: 10 INJECTION, EMULSION INTRAVENOUS at 16:44

## 2019-06-06 RX ADMIN — FENTANYL CITRATE 50 MCG: 50 INJECTION, SOLUTION INTRAMUSCULAR; INTRAVENOUS at 16:59

## 2019-06-06 RX ADMIN — FENTANYL CITRATE 25 MCG: 50 INJECTION, SOLUTION INTRAMUSCULAR; INTRAVENOUS at 17:56

## 2019-06-06 RX ADMIN — FENTANYL CITRATE 25 MCG: 50 INJECTION, SOLUTION INTRAMUSCULAR; INTRAVENOUS at 17:51

## 2019-06-06 RX ADMIN — GLYCOPYRROLATE 0.6 MG: 0.2 INJECTION INTRAMUSCULAR; INTRAVENOUS at 17:17

## 2019-06-06 RX ADMIN — FENTANYL CITRATE 25 MCG: 50 INJECTION, SOLUTION INTRAMUSCULAR; INTRAVENOUS at 18:01

## 2019-06-06 RX ADMIN — MIDAZOLAM 2 MG: 1 INJECTION INTRAMUSCULAR; INTRAVENOUS at 16:39

## 2019-06-06 RX ADMIN — LIDOCAINE HYDROCHLORIDE 100 MG: 10 INJECTION, SOLUTION INFILTRATION; PERINEURAL at 16:44

## 2019-06-06 RX ADMIN — FENTANYL CITRATE 25 MCG: 50 INJECTION, SOLUTION INTRAMUSCULAR; INTRAVENOUS at 17:46

## 2019-06-06 RX ADMIN — CEFAZOLIN SODIUM 2000 MG: 2 SOLUTION INTRAVENOUS at 16:44

## 2019-06-06 RX ADMIN — FENTANYL CITRATE 50 MCG: 50 INJECTION, SOLUTION INTRAMUSCULAR; INTRAVENOUS at 17:05

## 2019-06-06 RX ADMIN — FENTANYL CITRATE 100 MCG: 50 INJECTION, SOLUTION INTRAMUSCULAR; INTRAVENOUS at 16:44

## 2019-06-12 ENCOUNTER — OFFICE VISIT (OUTPATIENT)
Dept: ENDOCRINOLOGY | Facility: CLINIC | Age: 59
End: 2019-06-12
Payer: MEDICARE

## 2019-06-12 ENCOUNTER — PROCEDURE VISIT (OUTPATIENT)
Dept: NEUROLOGY | Facility: CLINIC | Age: 59
End: 2019-06-12
Payer: MEDICARE

## 2019-06-12 ENCOUNTER — TELEPHONE (OUTPATIENT)
Dept: NEUROLOGY | Facility: CLINIC | Age: 59
End: 2019-06-12

## 2019-06-12 VITALS — TEMPERATURE: 98.3 F | HEART RATE: 68 BPM | SYSTOLIC BLOOD PRESSURE: 109 MMHG | DIASTOLIC BLOOD PRESSURE: 57 MMHG

## 2019-06-12 VITALS
BODY MASS INDEX: 35.08 KG/M2 | HEIGHT: 72 IN | DIASTOLIC BLOOD PRESSURE: 60 MMHG | HEART RATE: 68 BPM | WEIGHT: 259 LBS | SYSTOLIC BLOOD PRESSURE: 124 MMHG

## 2019-06-12 DIAGNOSIS — E55.9 VITAMIN D DEFICIENCY: ICD-10-CM

## 2019-06-12 DIAGNOSIS — G43.709 CHRONIC MIGRAINE WITHOUT AURA WITHOUT STATUS MIGRAINOSUS, NOT INTRACTABLE: Primary | ICD-10-CM

## 2019-06-12 DIAGNOSIS — I10 ESSENTIAL HYPERTENSION: ICD-10-CM

## 2019-06-12 DIAGNOSIS — E11.65 TYPE 2 DIABETES MELLITUS WITH HYPERGLYCEMIA, WITH LONG-TERM CURRENT USE OF INSULIN (HCC): Primary | ICD-10-CM

## 2019-06-12 DIAGNOSIS — E78.5 HYPERLIPIDEMIA, UNSPECIFIED HYPERLIPIDEMIA TYPE: ICD-10-CM

## 2019-06-12 DIAGNOSIS — Z79.4 TYPE 2 DIABETES MELLITUS WITH HYPERGLYCEMIA, WITH LONG-TERM CURRENT USE OF INSULIN (HCC): Primary | ICD-10-CM

## 2019-06-12 PROCEDURE — 99214 OFFICE O/P EST MOD 30 MIN: CPT | Performed by: PHYSICIAN ASSISTANT

## 2019-06-12 PROCEDURE — 64615 CHEMODENERV MUSC MIGRAINE: CPT | Performed by: PSYCHIATRY & NEUROLOGY

## 2019-06-13 DIAGNOSIS — K21.9 GASTROESOPHAGEAL REFLUX DISEASE, ESOPHAGITIS PRESENCE NOT SPECIFIED: ICD-10-CM

## 2019-06-13 RX ORDER — OMEPRAZOLE 40 MG/1
CAPSULE, DELAYED RELEASE ORAL
Qty: 90 CAPSULE | Refills: 0 | Status: SHIPPED | OUTPATIENT
Start: 2019-06-13 | End: 2019-08-08 | Stop reason: SDUPTHER

## 2019-06-14 ENCOUNTER — APPOINTMENT (OUTPATIENT)
Dept: WOUND CARE | Facility: HOSPITAL | Age: 59
End: 2019-06-14
Payer: MEDICARE

## 2019-06-14 PROCEDURE — 11045 DBRDMT SUBQ TISS EACH ADDL: CPT | Performed by: SURGERY

## 2019-06-14 PROCEDURE — 11042 DBRDMT SUBQ TIS 1ST 20SQCM/<: CPT | Performed by: SURGERY

## 2019-06-14 PROCEDURE — 97606 NEG PRS WND THER DME>50 SQCM: CPT | Performed by: SURGERY

## 2019-06-17 ENCOUNTER — LAB REQUISITION (OUTPATIENT)
Dept: LAB | Facility: HOSPITAL | Age: 59
End: 2019-06-17
Payer: MEDICARE

## 2019-06-17 DIAGNOSIS — Z79.4 LONG TERM CURRENT USE OF INSULIN (HCC): ICD-10-CM

## 2019-06-17 DIAGNOSIS — N18.30 CHRONIC KIDNEY DISEASE, STAGE III (MODERATE) (HCC): ICD-10-CM

## 2019-06-17 DIAGNOSIS — E11.8 TYPE 2 DIABETES MELLITUS WITH COMPLICATIONS (HCC): ICD-10-CM

## 2019-06-17 LAB
ALBUMIN SERPL BCP-MCNC: 2.2 G/DL (ref 3.5–5)
ALP SERPL-CCNC: 89 U/L (ref 46–116)
ALT SERPL W P-5'-P-CCNC: 13 U/L (ref 12–78)
ANION GAP SERPL CALCULATED.3IONS-SCNC: 7 MMOL/L (ref 4–13)
AST SERPL W P-5'-P-CCNC: 13 U/L (ref 5–45)
BASOPHILS # BLD AUTO: 0.05 THOUSANDS/ΜL (ref 0–0.1)
BASOPHILS NFR BLD AUTO: 1 % (ref 0–1)
BILIRUB SERPL-MCNC: 0.36 MG/DL (ref 0.2–1)
BUN SERPL-MCNC: 6 MG/DL (ref 5–25)
CALCIUM SERPL-MCNC: 8.7 MG/DL (ref 8.3–10.1)
CHLORIDE SERPL-SCNC: 110 MMOL/L (ref 100–108)
CO2 SERPL-SCNC: 26 MMOL/L (ref 21–32)
CREAT SERPL-MCNC: 0.93 MG/DL (ref 0.6–1.3)
CREAT UR-MCNC: 262 MG/DL
EOSINOPHIL # BLD AUTO: 0.47 THOUSAND/ΜL (ref 0–0.61)
EOSINOPHIL NFR BLD AUTO: 8 % (ref 0–6)
ERYTHROCYTE [DISTWIDTH] IN BLOOD BY AUTOMATED COUNT: 15 % (ref 11.6–15.1)
GFR SERPL CREATININE-BSD FRML MDRD: 91 ML/MIN/1.73SQ M
GLUCOSE SERPL-MCNC: 84 MG/DL (ref 65–140)
HCT VFR BLD AUTO: 35.2 % (ref 36.5–49.3)
HGB BLD-MCNC: 10.5 G/DL (ref 12–17)
IMM GRANULOCYTES # BLD AUTO: 0.03 THOUSAND/UL (ref 0–0.2)
IMM GRANULOCYTES NFR BLD AUTO: 1 % (ref 0–2)
LYMPHOCYTES # BLD AUTO: 1.26 THOUSANDS/ΜL (ref 0.6–4.47)
LYMPHOCYTES NFR BLD AUTO: 20 % (ref 14–44)
MCH RBC QN AUTO: 29.9 PG (ref 26.8–34.3)
MCHC RBC AUTO-ENTMCNC: 29.8 G/DL (ref 31.4–37.4)
MCV RBC AUTO: 100 FL (ref 82–98)
MICROALBUMIN UR-MCNC: 29.4 MG/L (ref 0–20)
MICROALBUMIN/CREAT 24H UR: 11 MG/G CREATININE (ref 0–30)
MONOCYTES # BLD AUTO: 0.42 THOUSAND/ΜL (ref 0.17–1.22)
MONOCYTES NFR BLD AUTO: 7 % (ref 4–12)
NEUTROPHILS # BLD AUTO: 4.07 THOUSANDS/ΜL (ref 1.85–7.62)
NEUTS SEG NFR BLD AUTO: 63 % (ref 43–75)
NRBC BLD AUTO-RTO: 0 /100 WBCS
PLATELET # BLD AUTO: 222 THOUSANDS/UL (ref 149–390)
PMV BLD AUTO: 10.5 FL (ref 8.9–12.7)
POTASSIUM SERPL-SCNC: 4.3 MMOL/L (ref 3.5–5.3)
PROT SERPL-MCNC: 6.3 G/DL (ref 6.4–8.2)
RBC # BLD AUTO: 3.51 MILLION/UL (ref 3.88–5.62)
SODIUM SERPL-SCNC: 143 MMOL/L (ref 136–145)
TSH SERPL DL<=0.05 MIU/L-ACNC: 1.9 UIU/ML (ref 0.36–3.74)
WBC # BLD AUTO: 6.3 THOUSAND/UL (ref 4.31–10.16)

## 2019-06-17 PROCEDURE — 85025 COMPLETE CBC W/AUTO DIFF WBC: CPT | Performed by: FAMILY MEDICINE

## 2019-06-17 PROCEDURE — 80053 COMPREHEN METABOLIC PANEL: CPT | Performed by: FAMILY MEDICINE

## 2019-06-17 PROCEDURE — 82043 UR ALBUMIN QUANTITATIVE: CPT | Performed by: FAMILY MEDICINE

## 2019-06-17 PROCEDURE — 84443 ASSAY THYROID STIM HORMONE: CPT | Performed by: FAMILY MEDICINE

## 2019-06-17 PROCEDURE — 82570 ASSAY OF URINE CREATININE: CPT | Performed by: FAMILY MEDICINE

## 2019-06-18 ENCOUNTER — APPOINTMENT (OUTPATIENT)
Dept: RADIOLOGY | Facility: CLINIC | Age: 59
End: 2019-06-18
Payer: MEDICARE

## 2019-06-18 ENCOUNTER — TRANSCRIBE ORDERS (OUTPATIENT)
Dept: ADMINISTRATIVE | Facility: HOSPITAL | Age: 59
End: 2019-06-18

## 2019-06-18 DIAGNOSIS — M79.672 LEFT FOOT PAIN: Primary | ICD-10-CM

## 2019-06-18 DIAGNOSIS — M79.672 LEFT FOOT PAIN: ICD-10-CM

## 2019-06-18 PROCEDURE — 73630 X-RAY EXAM OF FOOT: CPT

## 2019-06-20 ENCOUNTER — HOSPITAL ENCOUNTER (OUTPATIENT)
Dept: RADIOLOGY | Facility: HOSPITAL | Age: 59
Discharge: HOME | End: 2019-06-20
Attending: ORTHOPAEDIC SURGERY
Payer: MEDICARE

## 2019-06-20 DIAGNOSIS — M23.92 INTERNAL DERANGEMENT OF LEFT KNEE: ICD-10-CM

## 2019-06-20 PROCEDURE — 73721 MRI JNT OF LWR EXTRE W/O DYE: CPT

## 2019-06-25 ENCOUNTER — OFFICE VISIT (OUTPATIENT)
Dept: OBGYN CLINIC | Facility: MEDICAL CENTER | Age: 59
End: 2019-06-25
Payer: MEDICARE

## 2019-06-25 VITALS
HEART RATE: 75 BPM | WEIGHT: 244.4 LBS | DIASTOLIC BLOOD PRESSURE: 69 MMHG | SYSTOLIC BLOOD PRESSURE: 107 MMHG | BODY MASS INDEX: 33.1 KG/M2 | HEIGHT: 72 IN

## 2019-06-25 DIAGNOSIS — S83.412A SPRAIN OF MEDIAL COLLATERAL LIGAMENT OF LEFT KNEE, INITIAL ENCOUNTER: Primary | ICD-10-CM

## 2019-06-25 PROCEDURE — 99213 OFFICE O/P EST LOW 20 MIN: CPT | Performed by: ORTHOPAEDIC SURGERY

## 2019-06-28 ENCOUNTER — APPOINTMENT (OUTPATIENT)
Dept: WOUND CARE | Facility: HOSPITAL | Age: 59
End: 2019-06-28
Payer: MEDICARE

## 2019-06-28 PROCEDURE — 11042 DBRDMT SUBQ TIS 1ST 20SQCM/<: CPT

## 2019-07-12 ENCOUNTER — APPOINTMENT (OUTPATIENT)
Dept: WOUND CARE | Facility: HOSPITAL | Age: 59
End: 2019-07-12
Payer: MEDICARE

## 2019-07-12 PROCEDURE — 97605 NEG PRS WND THER DME<=50SQCM: CPT | Performed by: SURGERY

## 2019-07-12 PROCEDURE — 11042 DBRDMT SUBQ TIS 1ST 20SQCM/<: CPT | Performed by: SURGERY

## 2019-07-19 ENCOUNTER — APPOINTMENT (OUTPATIENT)
Dept: WOUND CARE | Facility: HOSPITAL | Age: 59
End: 2019-07-19
Payer: MEDICARE

## 2019-07-19 PROCEDURE — 11042 DBRDMT SUBQ TIS 1ST 20SQCM/<: CPT | Performed by: SURGERY

## 2019-08-02 ENCOUNTER — APPOINTMENT (OUTPATIENT)
Dept: WOUND CARE | Facility: HOSPITAL | Age: 59
End: 2019-08-02
Payer: MEDICARE

## 2019-08-02 PROCEDURE — 11042 DBRDMT SUBQ TIS 1ST 20SQCM/<: CPT | Performed by: SURGERY

## 2019-08-08 ENCOUNTER — APPOINTMENT (EMERGENCY)
Dept: RADIOLOGY | Facility: HOSPITAL | Age: 59
DRG: 308 | End: 2019-08-08
Payer: MEDICARE

## 2019-08-08 ENCOUNTER — APPOINTMENT (EMERGENCY)
Dept: CT IMAGING | Facility: HOSPITAL | Age: 59
DRG: 308 | End: 2019-08-08
Payer: MEDICARE

## 2019-08-08 ENCOUNTER — HOSPITAL ENCOUNTER (INPATIENT)
Facility: HOSPITAL | Age: 59
LOS: 5 days | Discharge: NURSING, REHAB OR LONG-TERM CARE | DRG: 308 | End: 2019-08-13
Attending: EMERGENCY MEDICINE | Admitting: INTERNAL MEDICINE
Payer: MEDICARE

## 2019-08-08 DIAGNOSIS — L89.159 PRESSURE INJURY OF SKIN OF SACRAL REGION: ICD-10-CM

## 2019-08-08 DIAGNOSIS — R26.2 AMBULATORY DYSFUNCTION: Primary | ICD-10-CM

## 2019-08-08 DIAGNOSIS — I10 ESSENTIAL HYPERTENSION: ICD-10-CM

## 2019-08-08 DIAGNOSIS — G89.4 CHRONIC PAIN DISORDER: ICD-10-CM
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
## 2019-08-08 DIAGNOSIS — R00.1 BRADYCARDIA: ICD-10-CM
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
## 2019-08-08 LAB
ALBUMIN SERPL BCP-MCNC: 2.8 G/DL (ref 3.5–5)
ALP SERPL-CCNC: 107 U/L (ref 46–116)
ALT SERPL W P-5'-P-CCNC: 17 U/L (ref 12–78)
ANION GAP SERPL CALCULATED.3IONS-SCNC: 6 MMOL/L (ref 4–13)
AST SERPL W P-5'-P-CCNC: 13 U/L (ref 5–45)
BASOPHILS # BLD AUTO: 0.05 THOUSANDS/ΜL (ref 0–0.1)
BASOPHILS NFR BLD AUTO: 0 % (ref 0–1)
BILIRUB SERPL-MCNC: 0.4 MG/DL (ref 0.2–1)
BILIRUB UR QL STRIP: NEGATIVE
BUN SERPL-MCNC: 8 MG/DL (ref 5–25)
CALCIUM SERPL-MCNC: 8.8 MG/DL (ref 8.3–10.1)
CHLORIDE SERPL-SCNC: 108 MMOL/L (ref 100–108)
CLARITY UR: CLEAR
CO2 SERPL-SCNC: 28 MMOL/L (ref 21–32)
COLOR UR: YELLOW
CREAT SERPL-MCNC: 1.05 MG/DL (ref 0.6–1.3)
EOSINOPHIL # BLD AUTO: 0.61 THOUSAND/ΜL (ref 0–0.61)
EOSINOPHIL NFR BLD AUTO: 5 % (ref 0–6)
ERYTHROCYTE [DISTWIDTH] IN BLOOD BY AUTOMATED COUNT: 13.6 % (ref 11.6–15.1)
GFR SERPL CREATININE-BSD FRML MDRD: 79 ML/MIN/1.73SQ M
GLUCOSE SERPL-MCNC: 82 MG/DL (ref 65–140)
GLUCOSE SERPL-MCNC: 94 MG/DL (ref 65–140)
GLUCOSE UR STRIP-MCNC: NEGATIVE MG/DL
HCT VFR BLD AUTO: 41.4 % (ref 36.5–49.3)
HGB BLD-MCNC: 12.6 G/DL (ref 12–17)
HGB UR QL STRIP.AUTO: NEGATIVE
IMM GRANULOCYTES # BLD AUTO: 0.04 THOUSAND/UL (ref 0–0.2)
IMM GRANULOCYTES NFR BLD AUTO: 0 % (ref 0–2)
KETONES UR STRIP-MCNC: NEGATIVE MG/DL
LEUKOCYTE ESTERASE UR QL STRIP: NEGATIVE
LIPASE SERPL-CCNC: 49 U/L (ref 73–393)
LYMPHOCYTES # BLD AUTO: 1.54 THOUSANDS/ΜL (ref 0.6–4.47)
LYMPHOCYTES NFR BLD AUTO: 14 % (ref 14–44)
MAGNESIUM SERPL-MCNC: 2 MG/DL (ref 1.6–2.6)
MCH RBC QN AUTO: 28.4 PG (ref 26.8–34.3)
MCHC RBC AUTO-ENTMCNC: 30.4 G/DL (ref 31.4–37.4)
MCV RBC AUTO: 94 FL (ref 82–98)
MONOCYTES # BLD AUTO: 0.67 THOUSAND/ΜL (ref 0.17–1.22)
MONOCYTES NFR BLD AUTO: 6 % (ref 4–12)
NEUTROPHILS # BLD AUTO: 8.32 THOUSANDS/ΜL (ref 1.85–7.62)
NEUTS SEG NFR BLD AUTO: 75 % (ref 43–75)
NITRITE UR QL STRIP: NEGATIVE
NRBC BLD AUTO-RTO: 0 /100 WBCS
NT-PROBNP SERPL-MCNC: 316 PG/ML
PH UR STRIP.AUTO: 5.5 [PH]
PLATELET # BLD AUTO: 214 THOUSANDS/UL (ref 149–390)
PMV BLD AUTO: 10.4 FL (ref 8.9–12.7)
POTASSIUM SERPL-SCNC: 4.8 MMOL/L (ref 3.5–5.3)
PROT SERPL-MCNC: 6.7 G/DL (ref 6.4–8.2)
PROT UR STRIP-MCNC: NEGATIVE MG/DL
RBC # BLD AUTO: 4.43 MILLION/UL (ref 3.88–5.62)
SODIUM SERPL-SCNC: 142 MMOL/L (ref 136–145)
SP GR UR STRIP.AUTO: 1.01 (ref 1–1.03)
TSH SERPL DL<=0.05 MIU/L-ACNC: 1.88 UIU/ML (ref 0.36–3.74)
UROBILINOGEN UR QL STRIP.AUTO: 4 E.U./DL
WBC # BLD AUTO: 11.23 THOUSAND/UL (ref 4.31–10.16)

## 2019-08-08 PROCEDURE — 83880 ASSAY OF NATRIURETIC PEPTIDE: CPT | Performed by: EMERGENCY MEDICINE

## 2019-08-08 PROCEDURE — 96360 HYDRATION IV INFUSION INIT: CPT

## 2019-08-08 PROCEDURE — 85025 COMPLETE CBC W/AUTO DIFF WBC: CPT | Performed by: EMERGENCY MEDICINE

## 2019-08-08 PROCEDURE — 82948 REAGENT STRIP/BLOOD GLUCOSE: CPT

## 2019-08-08 PROCEDURE — 72125 CT NECK SPINE W/O DYE: CPT

## 2019-08-08 PROCEDURE — 71045 X-RAY EXAM CHEST 1 VIEW: CPT

## 2019-08-08 PROCEDURE — 74177 CT ABD & PELVIS W/CONTRAST: CPT

## 2019-08-08 PROCEDURE — 73630 X-RAY EXAM OF FOOT: CPT

## 2019-08-08 PROCEDURE — 83735 ASSAY OF MAGNESIUM: CPT | Performed by: EMERGENCY MEDICINE

## 2019-08-08 PROCEDURE — 81003 URINALYSIS AUTO W/O SCOPE: CPT | Performed by: EMERGENCY MEDICINE

## 2019-08-08 PROCEDURE — 96361 HYDRATE IV INFUSION ADD-ON: CPT

## 2019-08-08 PROCEDURE — 84443 ASSAY THYROID STIM HORMONE: CPT | Performed by: EMERGENCY MEDICINE

## 2019-08-08 PROCEDURE — 99223 1ST HOSP IP/OBS HIGH 75: CPT | Performed by: INTERNAL MEDICINE

## 2019-08-08 PROCEDURE — 36415 COLL VENOUS BLD VENIPUNCTURE: CPT | Performed by: EMERGENCY MEDICINE

## 2019-08-08 PROCEDURE — 83690 ASSAY OF LIPASE: CPT | Performed by: EMERGENCY MEDICINE

## 2019-08-08 PROCEDURE — 70450 CT HEAD/BRAIN W/O DYE: CPT

## 2019-08-08 PROCEDURE — 99285 EMERGENCY DEPT VISIT HI MDM: CPT

## 2019-08-08 PROCEDURE — 80053 COMPREHEN METABOLIC PANEL: CPT | Performed by: EMERGENCY MEDICINE

## 2019-08-08 PROCEDURE — 99285 EMERGENCY DEPT VISIT HI MDM: CPT | Performed by: EMERGENCY MEDICINE

## 2019-08-08 RX ORDER — ACETAMINOPHEN 325 MG/1
650 TABLET ORAL EVERY 6 HOURS PRN
Status: DISCONTINUED | OUTPATIENT
Start: 2019-08-08 | End: 2019-08-13 | Stop reason: HOSPADM

## 2019-08-08 RX ORDER — ALBUTEROL SULFATE 90 UG/1
2 AEROSOL, METERED RESPIRATORY (INHALATION) EVERY 6 HOURS PRN
Status: DISCONTINUED | OUTPATIENT
Start: 2019-08-08 | End: 2019-08-13 | Stop reason: HOSPADM

## 2019-08-08 RX ORDER — PROCHLORPERAZINE MALEATE 10 MG
10 TABLET ORAL EVERY 6 HOURS PRN
Status: DISCONTINUED | OUTPATIENT
Start: 2019-08-08 | End: 2019-08-09

## 2019-08-08 RX ORDER — PANTOPRAZOLE SODIUM 40 MG/1
40 TABLET, DELAYED RELEASE ORAL
Status: DISCONTINUED | OUTPATIENT
Start: 2019-08-09 | End: 2019-08-13 | Stop reason: HOSPADM

## 2019-08-08 RX ORDER — ZOLPIDEM TARTRATE 5 MG/1
10 TABLET ORAL
Status: DISCONTINUED | OUTPATIENT
Start: 2019-08-08 | End: 2019-08-13 | Stop reason: HOSPADM

## 2019-08-08 RX ORDER — ATORVASTATIN CALCIUM 40 MG/1
40 TABLET, FILM COATED ORAL
Status: DISCONTINUED | OUTPATIENT
Start: 2019-08-09 | End: 2019-08-13 | Stop reason: HOSPADM

## 2019-08-08 RX ORDER — HYDROXYZINE HYDROCHLORIDE 25 MG/1
50 TABLET, FILM COATED ORAL EVERY 6 HOURS PRN
Status: DISCONTINUED | OUTPATIENT
Start: 2019-08-08 | End: 2019-08-09

## 2019-08-08 RX ORDER — ASPIRIN 81 MG/1
81 TABLET ORAL DAILY
Status: DISCONTINUED | OUTPATIENT
Start: 2019-08-09 | End: 2019-08-13 | Stop reason: HOSPADM

## 2019-08-08 RX ORDER — FLUTICASONE FUROATE AND VILANTEROL 200; 25 UG/1; UG/1
1 POWDER RESPIRATORY (INHALATION)
Status: DISCONTINUED | OUTPATIENT
Start: 2019-08-09 | End: 2019-08-13 | Stop reason: HOSPADM

## 2019-08-08 RX ORDER — FOLIC ACID 1 MG/1
1000 TABLET ORAL DAILY
Status: DISCONTINUED | OUTPATIENT
Start: 2019-08-09 | End: 2019-08-13 | Stop reason: HOSPADM

## 2019-08-08 RX ORDER — OXYCODONE HYDROCHLORIDE 10 MG/1
30 TABLET ORAL EVERY 6 HOURS PRN
Status: DISCONTINUED | OUTPATIENT
Start: 2019-08-08 | End: 2019-08-13 | Stop reason: HOSPADM

## 2019-08-08 RX ORDER — GABAPENTIN 400 MG/1
800 CAPSULE ORAL 2 TIMES DAILY
Status: DISCONTINUED | OUTPATIENT
Start: 2019-08-08 | End: 2019-08-13 | Stop reason: HOSPADM

## 2019-08-08 RX ORDER — BACLOFEN 10 MG/1
10 TABLET ORAL 3 TIMES DAILY
Status: DISCONTINUED | OUTPATIENT
Start: 2019-08-08 | End: 2019-08-13 | Stop reason: HOSPADM

## 2019-08-08 RX ORDER — MAGNESIUM HYDROXIDE/ALUMINUM HYDROXICE/SIMETHICONE 120; 1200; 1200 MG/30ML; MG/30ML; MG/30ML
30 SUSPENSION ORAL EVERY 6 HOURS PRN
Status: DISCONTINUED | OUTPATIENT
Start: 2019-08-08 | End: 2019-08-13 | Stop reason: HOSPADM

## 2019-08-08 RX ORDER — NITROGLYCERIN 0.4 MG/1
0.4 TABLET SUBLINGUAL
Status: DISCONTINUED | OUTPATIENT
Start: 2019-08-08 | End: 2019-08-09

## 2019-08-08 RX ORDER — ALBUTEROL SULFATE 2.5 MG/3ML
1.25 SOLUTION RESPIRATORY (INHALATION) EVERY 6 HOURS PRN
Status: DISCONTINUED | OUTPATIENT
Start: 2019-08-08 | End: 2019-08-13 | Stop reason: HOSPADM

## 2019-08-08 RX ORDER — INSULIN GLARGINE 100 [IU]/ML
35 INJECTION, SOLUTION SUBCUTANEOUS EVERY MORNING
Status: DISCONTINUED | OUTPATIENT
Start: 2019-08-09 | End: 2019-08-13 | Stop reason: HOSPADM

## 2019-08-08 RX ORDER — POTASSIUM CHLORIDE 20 MEQ/1
40 TABLET, EXTENDED RELEASE ORAL DAILY
Status: DISCONTINUED | OUTPATIENT
Start: 2019-08-09 | End: 2019-08-13 | Stop reason: HOSPADM

## 2019-08-08 RX ORDER — TRAZODONE HYDROCHLORIDE 100 MG/1
100 TABLET ORAL
Status: DISCONTINUED | OUTPATIENT
Start: 2019-08-08 | End: 2019-08-13 | Stop reason: HOSPADM

## 2019-08-08 RX ORDER — SERTRALINE HYDROCHLORIDE 25 MG/1
25 TABLET, FILM COATED ORAL DAILY
Status: ON HOLD | COMMUNITY
End: 2020-01-31 | Stop reason: DRUGHIGH

## 2019-08-08 RX ORDER — ONDANSETRON 2 MG/ML
4 INJECTION INTRAMUSCULAR; INTRAVENOUS EVERY 6 HOURS PRN
Status: DISCONTINUED | OUTPATIENT
Start: 2019-08-08 | End: 2019-08-13 | Stop reason: HOSPADM

## 2019-08-08 RX ORDER — POLYETHYLENE GLYCOL 3350 17 G/17G
17 POWDER, FOR SOLUTION ORAL DAILY PRN
Status: DISCONTINUED | OUTPATIENT
Start: 2019-08-08 | End: 2019-08-13 | Stop reason: HOSPADM

## 2019-08-08 RX ORDER — HEPARIN SODIUM 5000 [USP'U]/ML
5000 INJECTION, SOLUTION INTRAVENOUS; SUBCUTANEOUS EVERY 8 HOURS SCHEDULED
Status: DISCONTINUED | OUTPATIENT
Start: 2019-08-08 | End: 2019-08-13 | Stop reason: HOSPADM

## 2019-08-08 RX ORDER — B-COMPLEX WITH VITAMIN C
2 TABLET ORAL
Status: DISCONTINUED | OUTPATIENT
Start: 2019-08-09 | End: 2019-08-13 | Stop reason: HOSPADM

## 2019-08-08 RX ORDER — MORPHINE SULFATE 30 MG/1
60 TABLET, FILM COATED, EXTENDED RELEASE ORAL EVERY 12 HOURS SCHEDULED
Status: DISCONTINUED | OUTPATIENT
Start: 2019-08-08 | End: 2019-08-13 | Stop reason: HOSPADM

## 2019-08-08 RX ORDER — CHOLECALCIFEROL (VITAMIN D3) 125 MCG
500 CAPSULE ORAL DAILY
Status: DISCONTINUED | OUTPATIENT
Start: 2019-08-09 | End: 2019-08-13 | Stop reason: HOSPADM

## 2019-08-08 RX ADMIN — SODIUM CHLORIDE 1000 ML: 0.9 INJECTION, SOLUTION INTRAVENOUS at 16:26

## 2019-08-08 RX ADMIN — IOHEXOL 100 ML: 350 INJECTION, SOLUTION INTRAVENOUS at 17:28

## 2019-08-08 RX ADMIN — OXYCODONE HYDROCHLORIDE 30 MG: 10 TABLET ORAL at 22:32

## 2019-08-08 RX ADMIN — TRAZODONE HYDROCHLORIDE 100 MG: 100 TABLET ORAL at 21:22

## 2019-08-08 RX ADMIN — BACLOFEN 10 MG: 10 TABLET ORAL at 21:22

## 2019-08-08 RX ADMIN — HEPARIN SODIUM 5000 UNITS: 5000 INJECTION INTRAVENOUS; SUBCUTANEOUS at 21:23

## 2019-08-08 RX ADMIN — GABAPENTIN 800 MG: 400 CAPSULE ORAL at 21:22

## 2019-08-08 RX ADMIN — MORPHINE SULFATE 60 MG: 30 TABLET, EXTENDED RELEASE ORAL at 21:22

## 2019-08-08 NOTE — ED ATTENDING ATTESTATION
Briana James MD, saw and evaluated the patient  I have discussed the patient with the resident/non-physician practitioner and agree with the resident's/non-physician practitioner's findings, Plan of Care, and MDM as documented in the resident's/non-physician practitioner's note, except where noted  All available labs and Radiology studies were reviewed  I was present for key portions of any procedure(s) performed by the resident/non-physician practitioner and I was immediately available to provide assistance  At this point I agree with the current assessment done in the Emergency Department  I have conducted an independent evaluation of this patient a history and physical is as follows:    67 YO male presents with lightheadedness and falls  Pt states some abdominal tenderness  He was walking, felt lightheaded, lost balance  Second time he did not have lightheadedness, just fell  He has not lost consciousness  Continues to have lightheadedness  Gen: Pt is in NAD  HEENT: Head is atraumatic, EOM's intact, neck has FROM  Chest: CTAB, non-tender  Heart: RRR  Abdomen: Soft, NT/ND  Musculoskeletal: FROM in all extremities  Skin: No rash, no ecchymosis  Neuro: Awake, alert, oriented x4; Cranial nerves II-XII intact  Psych: Normal affect    MDM -  Pt with multiple medical issues, lightheadedness  Will check ECG, troponin, CBC for anemia, electrolytes  Will likely require admission      Critical Care Time  Procedures

## 2019-08-08 NOTE — ASSESSMENT & PLAN NOTE
Reports history of chronic pain disorder   Continue home regimen of MS Contin 60 MG bid with PRN Oxycodone 30 mg q 6   Up with assistance  PT/OT consults

## 2019-08-08 NOTE — ED NOTES
Pt aware of necessary urine specimen  Unable to void at this time   Pt states, "I can only pee twice a day "     Brianne Jakcson  08/08/19 [id number]

## 2019-08-08 NOTE — H&P
[facility]  H&P- Flora Carter 1960, 66 y o  male MRN: [id number]    Unit/Bed#: ED 10 Encounter: [id number]    Primary Care Provider: Ephraim Simon MD   Date and time admitted to hospital: 8/8/2019  3:45 PM        * Symptomatic bradycardia  Assessment & Plan  Patient presents following two falls at home yesterday and today preceded by lightheadedness   Does have history of multiple hospitalizations due to falls at home   HR noted to fall to as low as 29 while in ED   Not currently on any beta blockage medications   Is on high dose opioid pain medication regimen   Monitor on telemetry   Consult to cardiology   Orthostatics daily     Fall at home  Assessment & Plan  Patient presents following two falls at home yesterday and today  Reports he believes he stood up too quickly, became lightheaded and fell   HR noted to fall as low at 29 in ED, will monitor on telemetry for consideration of symptomatic bradycardia  Orthostatic VS daily   PT/OT consults   Consider need for acute rehab, consult to case management     Essential hypertension  Assessment & Plan  History of HTN, reports history of multiple hypotensive episodes in the past few months  Recently taken off all hypertensive medications   BP controlled at this time  Orthostatics daily   Continue to monitor and avoid hypotension    Pressure injury of skin of sacral region  Assessment & Plan  Known sacral decubitus ulcer   Has wound care coming to house every other day   Continue local wound care and frequent turns while inpatient    Stage 3 chronic kidney disease ([facility] [address] )  Assessment & Plan  Creatinine at baseline at time of admission  Avoid hypotension and nephrotoxins       Chronic pain disorder  Assessment & Plan  Reports history of chronic pain disorder   Continue home regimen of MS Contin 60 MG bid with PRN Oxycodone 30 mg q 6   Up with assistance  PT/OT consults     Chronic diastolic congestive heart failure ([address] )  Assessment & Plan  Wt Readings from Last 3 Encounters:   08/08/19 108 kg (238 lb 8 6 oz)   06/25/19 111 kg (244 lb 6 4 oz)   06/12/19 117 kg (259 lb)   Currently euvolemic   Last Echo showing EF of 60%   Recently taken off torsemide for hypotension  Avoid excessive IV fluid hydration     Type 2 diabetes mellitus with renal complication [facility])  Assessment & Plan  Lab Results   Component Value Date    HGBA1C 7 6 08/15/2018       No results for input(s): POCGLU in the last 72 hours  Blood Sugar Average: Last 72 hrs:  Continue home 35 units QAM as he does at home  SSI coverage with ACCU checks   Diabetic diet       VTE Prophylaxis: Heparin  / sequential compression device   Code Status: full code  POLST: POLST form is not discussed and not completed at this time  Discussion with family: wife at bedside    Anticipated Length of Stay:  Patient will be admitted on an Inpatient basis with an anticipated length of stay of  More than 2 midnights  Justification for Hospital Stay: bradycardia, falls at home     Total Time for Visit, including Counseling / Coordination of Care: 45 minutes  Greater than 50% of this total time spent on direct patient counseling and coordination of care  Chief Complaint:   Fall at home, left foot pain    History of Present Illness:    Elfego Venegas is a 66 y o  male with a PMHx of HTN, HLD, CAD, sacral ulcer, DM on long term insulin who presents with fall at home and foot pain  Patient reports he was at home yesterday when he got off his chair quickly then walked to the front door, became lightheaded and fell  Patient reports he passed out and does not remember events of what happened prior to his family getting him up off the floor  Patient reports similar episode occurring today when his son slammed a door in the house which startled patient, he got up quickly and passed out again   Patient reports history of multiple falls recently, reports he was taken off all BP medications and spend time in rehab to regain strength  Patient walks with a rolling walker at baseline  Patient also reports bruising of left 2nd and 3rd toes reporting he fell on these toes when he fell  Patient reports he has little feeling in his feet but reports some pain in his foot  Patient denies lightheadedness or dizziness at time of admission at rest  Patient denies any chest pain or shortness of breath  Review of Systems:    Review of Systems   Constitutional: Negative for chills, fatigue and fever  HENT: Negative for ear pain  Eyes: Negative for visual disturbance  Respiratory: Negative for cough and shortness of breath  Cardiovascular: Positive for leg swelling  Negative for chest pain and palpitations  Gastrointestinal: Positive for abdominal pain and nausea  Negative for diarrhea  Endocrine: Negative for polyphagia and polyuria  Genitourinary: Negative for difficulty urinating and flank pain  Musculoskeletal: Positive for arthralgias, back pain, gait problem and joint swelling  Skin: Positive for wound  Allergic/Immunologic: Negative for immunocompromised state  Neurological: Positive for dizziness and light-headedness  Negative for numbness  Hematological: Does not bruise/bleed easily  Psychiatric/Behavioral: Negative for hallucinations and sleep disturbance         Past Medical and Surgical History:     Past Medical History:   Diagnosis Date    Acute on chronic diastolic congestive heart failure (HCC)     Altered gait     Alzheimer disease     per patients,,early onset     Angina pectoris ([facility] [address] )     Anxiety     Arthritis     Brain aneurysm     coils placed    Cardiac disease     Chest pain 1/13/2016    Chronic pain     back/ right groin and rle- has morphine pump    Constipation     COPD (chronic obstructive pulmonary disease) (HCC)     Coronary artery disease     CPAP (continuous positive airway pressure) dependence     Decubital ulcer     sacral decub-occured 5/2019-sees wound care/debide in OR today 6/6/2019    Dependent on walker for ambulation     w/c for long distance    Depression     Diabetes mellitus (HCC)     insulin dependent    Dizziness     occ    Dysphagia     Enlarged prostate     Fall     GERD (gastroesophageal reflux disease)     Heart failure ([facility] )     Hiatal hernia     Hx of gastric bypass 11/19/2018    Hypercholesterolemia     Hypertension     [facility]     2017- stents x2    Migraine     Morbid obesity ([facility] )     gastric bypass sleeve 11/2018-wt loss 125 lb    Neuropathy     Oxygen dependent     Q HS  2LPM with CPAP and prn during day 2-3 LPM     Shortness of breath     Skin abnormality     sacral wound - covered with pad    Sleep apnea     Stented coronary artery     Stroke ([facility] )     vision loss b/l  2005, residual R leg weakness    Urinary frequency     Use of cane as ambulatory aid     Wears dentures     Wears glasses     Wears glasses     Wheelchair dependent        Past Surgical History:   Procedure Laterality Date    BACK SURGERY      BRAIN SURGERY      CARDIAC CATHETERIZATION      with stents    CEREBRAL ANEURYSM REPAIR      with coils    COLONOSCOPY      ESOPHAGOGASTRODUODENOSCOPY N/A 7/1/2016    Procedure: ESOPHAGOGASTRODUODENOSCOPY (EGD); Surgeon: Estela Alejandro MD;  [facility]; Service:     GASTRIC BYPASS  11/19/2018    HERNIA REPAIR      HIATAL HERNIA REPAIR      INFUSION PUMP IMPLANTATION Left     morphine    KNEE ARTHROSCOPY Right     KNEE ARTHROSCOPY Right     PERONEAL NERVE DECOMPRESSION Right     RI DEBRIDEMENT OPEN WOUND 20 SQ CM< N/A 6/6/2019    Procedure: EXCISIONAL DEBRIDEMENT OF SACRAL DECUBITUS ULCER;  Surgeon: Mariela Gross MD;  Location: [facility] OR;  Service: General    RI ESOPHAGOGASTRODUODENOSCOPY TRANSORAL DIAGNOSTIC N/A 2/27/2017    Procedure: ESOPHAGOGASTRODUODENOSCOPY (EGD); Surgeon: Estela Alejandro MD;  [facility];   Service: Gastroenterology    RI ESOPHAGOGASTRODUODENOSCOPY TRANSORAL DIAGNOSTIC N/A 8/23/2018    Procedure: ESOPHAGOGASTRODUODENOSCOPY (EGD) with biopsy;  Surgeon: Katerin Ferrer MD;  [facility]; Service: Gastroenterology    AR INSERT SPINE INFUSN [facility] N/A 1/19/2017    Procedure: REMOVAL / EXCHANGE INTRATHECAL PAIN PUMP;  Surgeon: Howard Carrillo MD;  [facility];  Service: Orthopedics    AR INSERT SPINE INFUSN [facility] N/A 5/16/2016    Procedure: REPLACEMENT AND PROGRAM PUMP ;  Surgeon: Howard Carrillo MD;  [facility];  Service: Orthopedics       Meds/Allergies:    Prior to Admission medications    Medication Sig Start Date End Date Taking? Authorizing Provider   ADVAIR DISKUS 500-50 MCG/DOSE inhaler Inhale 1 puff 2 (two) times a day 7/13/18  Yes Historical Provider, MD   albuterol (ACCUNEB) 1 25 MG/3ML nebulizer solution Take 1 ampule by nebulization every 6 (six) hours as needed for wheezing   Yes Historical Provider, MD   albuterol (PROVENTIL HFA,VENTOLIN HFA) 90 mcg/act inhaler Inhale 2 puffs every 6 (six) hours as needed for wheezing   Yes Historical Provider, MD   AMITIZA 24 MCG capsule Take 24 mcg by mouth 2 (two) times a day with meals 2/1/19  Yes Historical Provider, MD   aspirin 81 MG tablet Take 81 mg by mouth daily   Yes Historical Provider, MD   atorvastatin (LIPITOR) 40 mg tablet Take 40 mg by mouth daily     Yes Historical Provider, MD   baclofen 10 mg tablet Take 10 mg by mouth 3 (three) times a day   Yes Historical Provider, MD   Cholecalciferol (VITAMIN D3) 5000 units CAPS Take 1 capsule (5,000 Units total) by mouth daily 10/9/18  Yes Alana Isaac PA-C   folic acid (FOLVITE) 1 mg tablet Take 1,000 mcg by mouth daily 1/25/19  Yes Historical Provider, MD   gabapentin (NEURONTIN) 800 mg tablet Take 1 tablet (800 mg total) by mouth 2 (two) times a day 5/9/19  Yes Markie Haines MD   hydrocortisone 1 % lotion Apply 1 application topically as needed  1/4/19 1/4/20 Yes Historical Provider, MD insulin degludec (TRESIBA FLEXTOUCH) 100 units/mL injection pen Inject 35 Units under the skin daily Taking 35 units every AM as directed   Yes Historical Provider, MD   Methylnaltrexone Bromide (RELISTOR) 150 MG TABS Take 450 mg by mouth as needed  2/20/19  Yes Historical Provider, MD   morphine (MS CONTIN) 60 mg 12 hr tablet Take 1 tablet (60 mg total) by mouth every 12 (twelve) hoursMax Daily Amount: 120 mg 5/9/19  Yes Kameron Alvarez MD   Morphine Sulfate Microinfusion (MORPHINE SULF MICROINFUSION PF IJ) Inject 14 mg as directed daily Via infusion pump   Yes Historical Provider, MD   Multiple Vitamin (MULTIVITAMIN) tablet Take 1 tablet by mouth 2 (two) times a day   Yes Historical Provider, MD   nitroglycerin (NITROSTAT) 0 4 mg SL tablet Place 0 4 mg under the tongue every 5 (five) minutes as needed for chest pain (pt has not  used recently)  Yes Historical Provider, MD   omeprazole (PriLOSEC) 20 mg delayed release capsule Take 1 capsule (20 mg total) by mouth daily  Patient taking differently: Take 40 mg by mouth daily  8/2/18  Yes Kalyan Martinez PA-C   oxyCODONE (ROXICODONE) 30 MG immediate release tablet Take 1 tablet (30 mg total) by mouth every 6 (six) hours as needed for moderate painMax Daily Amount: 120 mg 5/9/19  Yes Kameron Alvarez MD   POTASSIUM PO Take 40 mEq by mouth 2 (two) times a day   Yes Historical Provider, MD   prochlorperazine (COMPAZINE) 10 mg tablet TAKE 1 TABLET (10 MG TOTAL) BY MOUTH EVERY 6 (SIX) HOURS AS NEEDED FOR NAUSEA OR VOMITING  1/4/19  Yes Historical Provider, MD   tamsulosin (FLOMAX) 0 4 mg Take 0 4 mg by mouth daily with dinner     Yes Historical Provider, MD   ULTICARE SHORT PEN NEEDLES 31G X 8 MM MISC 4 INJECTIONS PER DAY DX  E11 8 8/28/18  Yes Historical Provider, MD   zolpidem (AMBIEN) 10 mg tablet Take 10 mg by mouth daily at bedtime as needed for sleep   Yes Historical Provider, MD   CALCIUM PO Take 1 tablet by mouth daily    Historical Provider, MD   Cyanocobalamin (VITAMIN B-12 PO) Take 1 tablet by mouth daily    Historical Provider, MD   ergocalciferol (VITAMIN D2) 50,000 units Take 50,000 Units by mouth once a week 1/25/19   Historical Provider, MD   hydrOXYzine HCL (ATARAX) 50 mg tablet Take 50 mg by mouth every 6 (six) hours as needed 1/14/19   Historical Provider, MD   Linaclotide 290 MCG CAPS Take 290 mcg by mouth as needed     Historical Provider, MD   polyethylene glycol (GLYCOLAX) powder MIX 1 HEAPING TABLESPOON (17 G) WITH 8 OUNCES OF WATER  PREPARE AND DRINK SOLUTION ONCE DAILY  12/21/18   Historical Provider, MD   traZODone (DESYREL) 100 mg tablet Take 1 tablet (100 mg total) by mouth daily at bedtime 5/9/19   Dasia Casey MD   omeprazole (PriLOSEC) 40 MG capsule TAKE 1 CAPSULE BY MOUTH DAILY 6/13/19 8/8/19  Agatha Wu MD     I have reviewed home medications with patient personally      Allergies: No Known Allergies    Social History:     Marital Status: /Civil Union   Occupation: disability  Patient Pre-hospital Living Situation: home with wife, has wound care every other day and home PT twice a week  Patient Pre-hospital Level of Mobility: ambulates with rolling walker at baseline  Patient Pre-hospital Diet Restrictions: diabetic, post bariatric surgery  Substance Use History:   Social History     Substance and Sexual Activity   Alcohol Use Yes    Frequency: Monthly or less    Comment: 2 times per year     Social History     Tobacco Use   Smoking Status Never Smoker   Smokeless Tobacco Never Used     Social History     Substance and Sexual Activity   Drug Use No       Family History:    Family History   Problem Relation Age of Onset    Diabetes unspecified Mother     Diabetes unspecified Brother     Diabetes unspecified Paternal Uncle     Diabetes unspecified Maternal Grandmother     Diabetes unspecified Paternal Grandmother     Diabetes unspecified Brother        Physical Exam:     Vitals:   Blood Pressure: 121/58 (08/08/19 1931)  Pulse: 56 (08/08/19 1931)  Temperature: 97 8 °F (36 6 °C) (08/08/19 1544)  Temp Source: Oral (08/08/19 1544)  Respirations: 18 (08/08/19 1931)  Weight - Scale: 108 kg (238 lb 8 6 oz) (08/08/19 1544)  SpO2: 97 % (08/08/19 1931)    Physical Exam   Constitutional: He is oriented to person, place, and time  He appears well-nourished  No distress  HENT:   Head: Normocephalic and atraumatic  Mouth/Throat: Oropharynx is clear and moist    Eyes: Conjunctivae and EOM are normal  No scleral icterus  Neck: Neck supple  Cardiovascular: Regular rhythm and normal heart sounds  Bradycardia present  No murmur heard  Pulmonary/Chest: Effort normal  He has no wheezes  Abdominal: Soft  Bowel sounds are normal  He exhibits no distension  There is tenderness (minimal, diffuse )  There is no guarding  Musculoskeletal: He exhibits no deformity  Left 2nd and 3rd toe ecchymosis, right ankle swelling     Neurological: He is alert and oriented to person, place, and time  Skin: Skin is warm and dry  Known sacral ulcer    Psychiatric: He has a normal mood and affect  His behavior is normal  Thought content normal    Vitals reviewed  Additional Data:     Lab Results: I have personally reviewed pertinent reports  Results from last 7 days   Lab Units 08/08/19  1626   WBC Thousand/uL 11 23*   HEMOGLOBIN g/dL 12 6   HEMATOCRIT % 41 4   PLATELETS Thousands/uL 214   NEUTROS PCT % 75   LYMPHS PCT % 14   MONOS PCT % 6   EOS PCT % 5     Results from last 7 days   Lab Units 08/08/19  1626   SODIUM mmol/L 142   POTASSIUM mmol/L 4 8   CHLORIDE mmol/L 108   CO2 mmol/L 28   BUN mg/dL 8   CREATININE mg/dL 1 05   ANION GAP mmol/L 6   CALCIUM mg/dL 8 8   ALBUMIN g/dL 2 8*   TOTAL BILIRUBIN mg/dL 0 40   ALK PHOS U/L 107   ALT U/L 17   AST U/L 13   GLUCOSE RANDOM mg/dL 94                       Imaging: I have personally reviewed pertinent reports        CT head without contrast   Final Result by Rashid Gould DO (08/08 1749)   No acute intracranial abnormality  Workstation performed: [id number]         CT cervical spine without contrast   Final Result by Gerardo Shabazz DO (08/08 1753)   No cervical spine fracture or traumatic malalignment  Workstation performed: [id number]         CT abdomen pelvis with contrast   Final Result by Gerardo Shabazz DO (08/08 1808)   No visceral or osseous injury identified  Skin thickening in the region of the umbilicus which may represent inflammation  Significant skin thickening overlying lower sacrum/coccyx and the upper gluteal fold  No obvious abscess on this noncontrast CT  Please clinically evaluate for sacral decubitus  Coronary and aortic atherosclerosis  Fluid-filled esophagus suggesting gastroesophageal reflux  Mild hepatosplenomegaly  Colonic diverticulosis without evidence of diverticulitis  Workstation performed: [id number]         XR chest 1 view portable   Final Result by Servando Hinton MD (08/08 1815)      No acute cardiopulmonary disease  Workstation performed: [id number]         XR foot 3+ vw left    (Results Pending)       EKG, Pathology, and Other Studies Reviewed on Admission:   · EKG: sinus bradycardia    Allscripts / Epic Records Reviewed: Yes     ** Please Note: This note has been constructed using a voice recognition system   **

## 2019-08-08 NOTE — ED NOTES
Pt cardiac monitor alarming  Pt HR noted to drop down to 29 and ranging into the 30's  RN entered room to assess pt   pt noted to be awake, alert and orient  Not offering any complaints  Denies any chest pain  Denies any shortness of breath  Provider made aware        Irene SANTANA Sep  08/08/19 [address] Sep  08/08/19 9213

## 2019-08-08 NOTE — ASSESSMENT & PLAN NOTE
History of HTN, reports history of multiple hypotensive episodes in the past few months  Recently taken off all hypertensive medications   BP controlled at this time  Orthostatics daily   Continue to monitor and avoid hypotension

## 2019-08-08 NOTE — ASSESSMENT & PLAN NOTE
Patient presents following two falls at home yesterday and today preceded by lightheadedness   Does have history of multiple hospitalizations due to falls at home   HR noted to fall to as low as 29 while in ED   Not currently on any beta blockage medications   Is on high dose opioid pain medication regimen   Monitor on telemetry   Consult to cardiology   Orthostatics daily

## 2019-08-08 NOTE — ASSESSMENT & PLAN NOTE
Known sacral decubitus ulcer   Has wound care coming to house every other day   Continue local wound care and frequent turns while inpatient

## 2019-08-08 NOTE — ASSESSMENT & PLAN NOTE
Patient presents following two falls at home yesterday and today  Reports he believes he stood up too quickly, became lightheaded and fell   HR noted to fall as low at 29 in ED, will monitor on telemetry for consideration of symptomatic bradycardia  Orthostatic VS daily   PT/OT consults   Consider need for acute rehab, consult to case management

## 2019-08-08 NOTE — ASSESSMENT & PLAN NOTE
Wt Readings from Last 3 Encounters:   08/08/19 108 kg (238 lb 8 6 oz)   06/25/19 111 kg (244 lb 6 4 oz)   06/12/19 117 kg (259 lb)   Currently euvolemic   Last Echo showing EF of 60%   Recently taken off torsemide for hypotension  Avoid excessive IV fluid hydration

## 2019-08-08 NOTE — ASSESSMENT & PLAN NOTE
Lab Results   Component Value Date    HGBA1C 7 6 08/15/2018       No results for input(s): POCGLU in the last 72 hours      Blood Sugar Average: Last 72 hrs:  Continue home 35 units QAM as he does at home  SSI coverage with ACCU checks   Diabetic diet

## 2019-08-09 LAB
ANION GAP SERPL CALCULATED.3IONS-SCNC: 9 MMOL/L (ref 4–13)
BUN SERPL-MCNC: 7 MG/DL (ref 5–25)
CALCIUM SERPL-MCNC: 8.4 MG/DL (ref 8.3–10.1)
CHLORIDE SERPL-SCNC: 110 MMOL/L (ref 100–108)
CO2 SERPL-SCNC: 24 MMOL/L (ref 21–32)
CREAT SERPL-MCNC: 0.84 MG/DL (ref 0.6–1.3)
ERYTHROCYTE [DISTWIDTH] IN BLOOD BY AUTOMATED COUNT: 13.6 % (ref 11.6–15.1)
GFR SERPL CREATININE-BSD FRML MDRD: 98 ML/MIN/1.73SQ M
GLUCOSE SERPL-MCNC: 117 MG/DL (ref 65–140)
GLUCOSE SERPL-MCNC: 166 MG/DL (ref 65–140)
GLUCOSE SERPL-MCNC: 73 MG/DL (ref 65–140)
GLUCOSE SERPL-MCNC: 97 MG/DL (ref 65–140)
GLUCOSE SERPL-MCNC: 98 MG/DL (ref 65–140)
HCT VFR BLD AUTO: 37.2 % (ref 36.5–49.3)
HGB BLD-MCNC: 11.4 G/DL (ref 12–17)
MAGNESIUM SERPL-MCNC: 2.1 MG/DL (ref 1.6–2.6)
MCH RBC QN AUTO: 28.7 PG (ref 26.8–34.3)
MCHC RBC AUTO-ENTMCNC: 30.6 G/DL (ref 31.4–37.4)
MCV RBC AUTO: 94 FL (ref 82–98)
PLATELET # BLD AUTO: 155 THOUSANDS/UL (ref 149–390)
PMV BLD AUTO: 11.6 FL (ref 8.9–12.7)
POTASSIUM SERPL-SCNC: 4.3 MMOL/L (ref 3.5–5.3)
RBC # BLD AUTO: 3.97 MILLION/UL (ref 3.88–5.62)
SODIUM SERPL-SCNC: 143 MMOL/L (ref 136–145)
WBC # BLD AUTO: 8.47 THOUSAND/UL (ref 4.31–10.16)

## 2019-08-09 PROCEDURE — 99222 1ST HOSP IP/OBS MODERATE 55: CPT | Performed by: INTERNAL MEDICINE

## 2019-08-09 PROCEDURE — 80048 BASIC METABOLIC PNL TOTAL CA: CPT | Performed by: PHYSICIAN ASSISTANT

## 2019-08-09 PROCEDURE — G8979 MOBILITY GOAL STATUS: HCPCS

## 2019-08-09 PROCEDURE — G8988 SELF CARE GOAL STATUS: HCPCS

## 2019-08-09 PROCEDURE — 82948 REAGENT STRIP/BLOOD GLUCOSE: CPT

## 2019-08-09 PROCEDURE — 97167 OT EVAL HIGH COMPLEX 60 MIN: CPT

## 2019-08-09 PROCEDURE — 83735 ASSAY OF MAGNESIUM: CPT | Performed by: PHYSICIAN ASSISTANT

## 2019-08-09 PROCEDURE — 85027 COMPLETE CBC AUTOMATED: CPT | Performed by: PHYSICIAN ASSISTANT

## 2019-08-09 PROCEDURE — G8978 MOBILITY CURRENT STATUS: HCPCS

## 2019-08-09 PROCEDURE — G8987 SELF CARE CURRENT STATUS: HCPCS

## 2019-08-09 PROCEDURE — 99232 SBSQ HOSP IP/OBS MODERATE 35: CPT | Performed by: INTERNAL MEDICINE

## 2019-08-09 PROCEDURE — 97163 PT EVAL HIGH COMPLEX 45 MIN: CPT

## 2019-08-09 RX ORDER — PROCHLORPERAZINE MALEATE 5 MG/1
5 TABLET ORAL EVERY 6 HOURS PRN
Status: DISCONTINUED | OUTPATIENT
Start: 2019-08-09 | End: 2019-08-13 | Stop reason: HOSPADM

## 2019-08-09 RX ORDER — FLUDROCORTISONE ACETATE 0.1 MG/1
0.1 TABLET ORAL DAILY
Status: DISCONTINUED | OUTPATIENT
Start: 2019-08-09 | End: 2019-08-13 | Stop reason: HOSPADM

## 2019-08-09 RX ORDER — HYDROXYZINE HYDROCHLORIDE 25 MG/1
25 TABLET, FILM COATED ORAL EVERY 6 HOURS PRN
Status: DISCONTINUED | OUTPATIENT
Start: 2019-08-09 | End: 2019-08-13 | Stop reason: HOSPADM

## 2019-08-09 RX ADMIN — HEPARIN SODIUM 5000 UNITS: 5000 INJECTION INTRAVENOUS; SUBCUTANEOUS at 21:52

## 2019-08-09 RX ADMIN — TRAZODONE HYDROCHLORIDE 100 MG: 100 TABLET ORAL at 21:52

## 2019-08-09 RX ADMIN — BACLOFEN 10 MG: 10 TABLET ORAL at 15:00

## 2019-08-09 RX ADMIN — FOLIC ACID 1000 MCG: 1 TABLET ORAL at 08:36

## 2019-08-09 RX ADMIN — GABAPENTIN 800 MG: 400 CAPSULE ORAL at 17:26

## 2019-08-09 RX ADMIN — HEPARIN SODIUM 5000 UNITS: 5000 INJECTION INTRAVENOUS; SUBCUTANEOUS at 05:36

## 2019-08-09 RX ADMIN — GABAPENTIN 800 MG: 400 CAPSULE ORAL at 08:37

## 2019-08-09 RX ADMIN — POTASSIUM CHLORIDE 40 MEQ: 1500 TABLET, EXTENDED RELEASE ORAL at 08:38

## 2019-08-09 RX ADMIN — Medication 1 TABLET: at 08:37

## 2019-08-09 RX ADMIN — LUBIPROSTONE 24 MCG: 24 CAPSULE, GELATIN COATED ORAL at 17:26

## 2019-08-09 RX ADMIN — CALCIUM CARBONATE 500 MG (1,250 MG)-VITAMIN D3 200 UNIT TABLET 2 TABLET: at 08:36

## 2019-08-09 RX ADMIN — ONDANSETRON 4 MG: 2 INJECTION INTRAMUSCULAR; INTRAVENOUS at 12:31

## 2019-08-09 RX ADMIN — FLUTICASONE FUROATE AND VILANTEROL TRIFENATATE 1 PUFF: 200; 25 POWDER RESPIRATORY (INHALATION) at 08:39

## 2019-08-09 RX ADMIN — BACLOFEN 10 MG: 10 TABLET ORAL at 08:37

## 2019-08-09 RX ADMIN — CYANOCOBALAMIN TAB 500 MCG 500 MCG: 500 TAB at 08:36

## 2019-08-09 RX ADMIN — OXYCODONE HYDROCHLORIDE 30 MG: 10 TABLET ORAL at 05:37

## 2019-08-09 RX ADMIN — LUBIPROSTONE 24 MCG: 24 CAPSULE, GELATIN COATED ORAL at 08:41

## 2019-08-09 RX ADMIN — MORPHINE SULFATE 60 MG: 30 TABLET, EXTENDED RELEASE ORAL at 09:07

## 2019-08-09 RX ADMIN — ATORVASTATIN CALCIUM 40 MG: 40 TABLET, FILM COATED ORAL at 17:26

## 2019-08-09 RX ADMIN — OXYCODONE HYDROCHLORIDE 30 MG: 10 TABLET ORAL at 12:28

## 2019-08-09 RX ADMIN — ASPIRIN 81 MG: 81 TABLET, COATED ORAL at 08:37

## 2019-08-09 RX ADMIN — OXYCODONE HYDROCHLORIDE 30 MG: 10 TABLET ORAL at 19:01

## 2019-08-09 RX ADMIN — INSULIN GLARGINE 35 UNITS: 100 INJECTION, SOLUTION SUBCUTANEOUS at 09:08

## 2019-08-09 RX ADMIN — MORPHINE SULFATE 60 MG: 30 TABLET, EXTENDED RELEASE ORAL at 21:52

## 2019-08-09 RX ADMIN — FLUDROCORTISONE ACETATE 0.1 MG: 0.1 TABLET ORAL at 12:28

## 2019-08-09 RX ADMIN — PANTOPRAZOLE SODIUM 40 MG: 40 TABLET, DELAYED RELEASE ORAL at 05:37

## 2019-08-09 RX ADMIN — BACLOFEN 10 MG: 10 TABLET ORAL at 21:52

## 2019-08-09 RX ADMIN — HEPARIN SODIUM 5000 UNITS: 5000 INJECTION INTRAVENOUS; SUBCUTANEOUS at 14:59

## 2019-08-09 NOTE — SOCIAL WORK
Cm met with Pt to discuss cm role and DCP needs  Pt lives with spouse and sons in a [address] [place] [address] home with 5 QUE to enter the house and 15 QUE to go upstairs to his bedroom and bath  Pt is independent with ADL's and uses a walker to ambulate  Currently get his DME at [facility]  Prior to admission pt was getting PT/OT/ Wound care service through [facility], but pt wants to change it to [facility]  CM sent referral to [facility] via [facility]  Pt has RX coverage [place] [facility] [address] [facility] [place] [place] [facility]  Pt is on SSID and see Doc Chetna Sagastume for his anxiety and MH managements  PT denies D/A treatments  Pt has no POA but wife Geraldine Wood is Emergency contact [phone]  Patient/caregiver received discharge checklist   Content reviewed  Patient/caregiver encouraged to participate in discharge plan of care prior to discharge home  CM reviewed d/c planning process including the following: identifying help at home, patient preference for d/c planning needs, Discharge Lounge, [facility] Meds to Bed program, availability of treatment team to discuss questions or concerns patient and/or family may have regarding understanding medications and recognizing signs and symptoms once discharged  CM also encouraged patient to follow up with all recommended appointments after discharge  Patient advised of importance for patient and family to participate in managing patients medical well being

## 2019-08-09 NOTE — PLAN OF CARE
Problem: Potential for Falls  Goal: Patient will remain free of falls  Description  INTERVENTIONS:  - Assess patient frequently for physical needs  -  Identify cognitive and physical deficits and behaviors that affect risk of falls  -  [place] fall precautions as indicated by assessment   - Educate patient/family on patient safety including physical limitations  - Instruct patient to call for assistance with activity based on assessment  - Modify environment to reduce risk of injury  - Consider OT/PT consult to assist with strengthening/mobility  Outcome: Progressing     Problem: Prexisting or High Potential for Compromised Skin Integrity  Goal: Skin integrity is maintained or improved  Description  INTERVENTIONS:  - Identify patients at risk for skin breakdown  - Assess and monitor skin integrity  - Assess and monitor nutrition and hydration status  - Monitor labs (i e  albumin)  - Assess for incontinence   - Turn and reposition patient  - Assist with mobility/ambulation  - Relieve pressure over bony prominences  - Avoid friction and shearing  - Provide appropriate hygiene as needed including keeping skin clean and dry  - Evaluate need for skin moisturizer/barrier cream  - Collaborate with interdisciplinary team (i e  Nutrition, Rehabilitation, etc )   - Patient/family teaching  Outcome: Progressing     Problem: Nutrition/Hydration-ADULT  Goal: Nutrient/Hydration intake appropriate for improving, restoring or maintaining nutritional needs  Description  Monitor and assess patient's nutrition/hydration status for malnutrition (ex- brittle hair, bruises, dry skin, pale skin and conjunctiva, muscle wasting, smooth red tongue, and disorientation)  Collaborate with interdisciplinary team and initiate plan and interventions as ordered  Monitor patient's weight and dietary intake as ordered or per policy  Utilize nutrition screening tool and intervene per policy   Determine patient's food preferences and provide high-protein, high-caloric foods as appropriate       INTERVENTIONS:  - Monitor oral intake, urinary output, labs, and treatment plans  - Assess nutrition and hydration status and recommend course of action  - Evaluate amount of meals eaten  - Assist patient with eating if necessary   - Allow adequate time for meals  - Recommend/ encourage appropriate diets, oral nutritional supplements, and vitamin/mineral supplements  - Order, calculate, and assess calorie counts as needed  - Recommend, monitor, and adjust tube feedings and TPN/PPN based on assessed needs  - Assess need for intravenous fluids  - Provide specific nutrition/hydration education as appropriate  - Include patient/family/caregiver in decisions related to nutrition  Outcome: Progressing     Problem: PAIN - ADULT  Goal: Verbalizes/displays adequate comfort level or baseline comfort level  Description  Interventions:  - Encourage patient to monitor pain and request assistance  - Assess pain using appropriate pain scale  - Administer analgesics based on type and severity of pain and evaluate response  - Implement non-pharmacological measures as appropriate and evaluate response  - Consider cultural and social influences on pain and pain management  - Notify physician/advanced practitioner if interventions unsuccessful or patient reports new pain  Outcome: Progressing     Problem: INFECTION - ADULT  Goal: Absence or prevention of progression during hospitalization  Description  INTERVENTIONS:  - Assess and monitor for signs and symptoms of infection  - Monitor lab/diagnostic results  - Monitor all insertion sites, i e  indwelling lines, tubes, and drains  - Monitor endotracheal (as able) and nasal secretions for changes in amount and color  - [place] appropriate cooling/warming therapies per order  - Administer medications as ordered  - Instruct and encourage patient and family to use good hand hygiene technique  - Identify and instruct in appropriate isolation precautions for identified infection/condition  Outcome: Progressing     Problem: SAFETY ADULT  Goal: Patient will remain free of falls  Description  INTERVENTIONS:  - Assess patient frequently for physical needs  -  Identify cognitive and physical deficits and behaviors that affect risk of falls    -  [place] fall precautions as indicated by assessment   - Educate patient/family on patient safety including physical limitations  - Instruct patient to call for assistance with activity based on assessment  - Modify environment to reduce risk of injury  - Consider OT/PT consult to assist with strengthening/mobility  Outcome: Progressing  Goal: Maintain or return to baseline ADL function  Description  INTERVENTIONS:  -  Assess patient's ability to carry out ADLs; assess patient's baseline for ADL function and identify physical deficits which impact ability to perform ADLs (bathing, care of mouth/teeth, toileting, grooming, dressing, etc )  - Assess/evaluate cause of self-care deficits   - Assess range of motion  - Assess patient's mobility; develop plan if impaired  - Assess patient's need for assistive devices and provide as appropriate  - Encourage maximum independence but intervene and supervise when necessary  ¯ Involve family in performance of ADLs  ¯ Assess for home care needs following discharge   ¯ Request OT consult to assist with ADL evaluation and planning for discharge  ¯ Provide patient education as appropriate  Outcome: Progressing  Goal: Maintain or return mobility status to optimal level  Description  INTERVENTIONS:  - Assess patient's baseline mobility status (ambulation, transfers, stairs, etc )    - Identify cognitive and physical deficits and behaviors that affect mobility  - Identify mobility aids required to assist with transfers and/or ambulation (gait belt, sit-to-stand, lift, walker, cane, etc )  - [place] fall precautions as indicated by assessment  - Record patient progress and toleration of activity level on Mobility SBAR; progress patient to next Phase/Stage  - Instruct patient to call for assistance with activity based on assessment  - Request Rehabilitation consult to assist with strengthening/weightbearing, etc   Outcome: Progressing     Problem: DISCHARGE PLANNING  Goal: Discharge to home or other facility with appropriate resources  Description  INTERVENTIONS:  - Identify barriers to discharge w/patient and caregiver  - Arrange for needed discharge resources and transportation as appropriate  - Identify discharge learning needs (meds, wound care, etc )  - Arrange for interpretive services to assist at discharge as needed  - Refer to Case Management Department for coordinating discharge planning if the patient needs post-hospital services based on physician/advanced practitioner order or complex needs related to functional status, cognitive ability, or social support system  Outcome: Progressing     Problem: Knowledge Deficit  Goal: Patient/family/caregiver demonstrates understanding of disease process, treatment plan, medications, and discharge instructions  Description  Complete learning assessment and assess knowledge base  Interventions:  - Provide teaching at level of understanding  - Provide teaching via preferred learning methods  Outcome: Progressing     Problem: CARDIOVASCULAR - ADULT  Goal: Maintains optimal cardiac output and hemodynamic stability  Description  INTERVENTIONS:  - Monitor I/O, vital signs and rhythm  - Monitor for S/S and trends of decreased cardiac output i e  bleeding, hypotension  - Administer and titrate ordered vasoactive medications to optimize hemodynamic stability  - Assess quality of pulses, skin color and temperature  - Assess for signs of decreased coronary artery perfusion - ex   Angina  - Instruct patient to report change in severity of symptoms  Outcome: Progressing  Goal: Absence of cardiac dysrhythmias or at baseline rhythm  Description  INTERVENTIONS:  - Continuous cardiac monitoring, monitor vital signs, obtain 12 lead EKG if indicated  - Administer antiarrhythmic and heart rate control medications as ordered  - Monitor electrolytes and administer replacement therapy as ordered  Outcome: Progressing     Problem: SKIN/TISSUE INTEGRITY - ADULT  Goal: Skin integrity remains intact  Description  INTERVENTIONS  - Identify patients at risk for skin breakdown  - Assess and monitor skin integrity  - Assess and monitor nutrition and hydration status  - Monitor labs (i e  albumin)  - Assess for incontinence   - Turn and reposition patient  - Assist with mobility/ambulation  - Relieve pressure over bony prominences  - Avoid friction and shearing  - Provide appropriate hygiene as needed including keeping skin clean and dry  - Evaluate need for skin moisturizer/barrier cream  - Collaborate with interdisciplinary team (i e  Nutrition, Rehabilitation, etc )   - Patient/family teaching  Outcome: Progressing  Goal: Incision(s), wounds(s) or drain site(s) healing without S/S of infection  Description  INTERVENTIONS  - Assess and document risk factors for skin impairment   - Assess and document dressing, incision, wound bed, drain sites and surrounding tissue  - Initiate Nutrition services consult and/or wound management as needed  Outcome: Progressing  Goal: Oral mucous membranes remain intact  Description  INTERVENTIONS  - Assess oral mucosa and hygiene practices  - Implement preventative oral hygiene regimen  - Implement oral medicated treatments as ordered  - Initiate Nutrition services referral as needed  Outcome: Progressing

## 2019-08-09 NOTE — CONSULTS
Consult Note - Wound   Eliza Mcmanus 66 y o  male MRN: [id number]  Unit/Bed#: E4 -01 Encounter: [id number]      History and Present Illness:  66year old male presented to the hospital status post fall  Patient's history significant for DM  Assessment Findings:   Patient seen for per physician consult  Reports right lower extremity pain  Ambulates with walker  Able to turn himself in bed independently for assessment  Denies incontinence  Nutrition team following, supplements ordered  Bilateral heels are intact  Right lower extremity with mild edema  Left 2nd and 3rd toes with bruising and mild edema--x-ray negative for fracture  Blanchable erythema to bilateral buttocks and sacrum with scar tissue surrounding healing wound  1   Present on admission 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--patient originally developed this wound during previous hospitalization  The wound is healing  Patient follows-up at the [facility]  He has a low air-loss mattress at home  Wound bed is clean with very small amount of yellow slough  Shae-wound with intact pink, blanchable, scar tissue  Scant drainage  No evidence of wound infection  See flowsheet for wound details  Plan:   1-Hydraguard lotion to bilateral heels BID and PRN  2-Elevate heels to offload pressure  3-EHOB offloading cushion in chair when out of bed  4-Moisturize skin daily with skin nourishing cream   5-Encourage/remind patient to turn/reposition q2h or when medically stable for pressure re-distribution on skin  6-P500 low air-loss mattress  7-Sacral wound--cleanse with normal saline, pat dry  3m [place] no sting skin barrier film to shae-wound skin  Pack sacral wound with saline moistened 1/2 inch plain packing  Cover with ABD and secure with tape  Change dressing daily  8-Wound care team to follow  Plan of care reviewed with primary RN      Wound 05/08/19 Pressure Injury Sacrum Right;Mid;Left (Active)   Wound Image   8/9/2019  4:00 PM   Wound Description Pink;Yellow;Slough 8/9/2019  4:00 PM   Staging Stage IV 8/9/2019  4:00 PM   Pauline-wound Assessment Erythema;Fragile 8/9/2019  4:00 PM   Wound Length (cm) 5 cm 8/9/2019  4:00 PM   Wound Width (cm) 0 5 cm 8/9/2019  4:00 PM   Wound Depth (cm) 1 8 8/9/2019  4:00 PM   Calculated Wound Area (cm^2) 2 5 cm^2 8/9/2019  4:00 PM   Calculated Wound Volume (cm^3) 4 5 cm^3 8/9/2019  4:00 PM   Change in Wound Size % 44 58 8/9/2019  4:00 PM   Tunneling 0 cm 8/9/2019  4:00 PM   Undermining 0 8/9/2019  4:00 PM   Closure Unapproximated 8/9/2019  4:00 PM   Drainage Amount Scant 8/9/2019  4:00 PM   Drainage Description Serosanguineous 8/9/2019  4:00 PM   Non-staged Wound Description Full thickness 8/9/2019  4:00 PM   Treatments Cleansed 8/9/2019  4:00 PM   Dressing Plain packing strip;ABD 8/9/2019  4:00 PM   Wound packed? Yes 8/9/2019  4:00 PM   Packing- # removed 1 8/9/2019  4:00 PM   Packing- # inserted 1 8/9/2019  4:00 PM   Dressing Changed Changed 8/9/2019  4:00 PM   Patient Tolerance Tolerated well 8/9/2019  4:00 PM   Dressing Status Clean;Dry; Intact 8/9/2019  4:00 PM         Jayro MARINON, RN, [employer]

## 2019-08-09 NOTE — UTILIZATION REVIEW
Initial Clinical Review    Admission: Date/Time/Statement: 8/8/19 @ 1823     Orders Placed This Encounter   Procedures    Inpatient Admission (expected length of stay for this patient Order details is greater than two midnights)     Standing Status:   Standing     Number of Occurrences:   1     Order Specific Question:   Admitting Physician     Answer:   Dano Castellon [[id number]]     Order Specific Question:   Level of Care     Answer:   Med Surg [[id number]]     Order Specific Question:   Estimated length of stay     Answer:   More than 2 Midnights     Order Specific Question:   Certification     Answer:   I certify that inpatient services are medically necessary for this patient for a duration of greater than two midnights  See H&P and MD Progress Notes for additional information about the patient's course of treatment  ED Arrival Information     Expected Arrival Acuity Means of Arrival Escorted By Service Admission Type    - 8/8/2019 15:39 Urgent Walk-In Spouse Hospitalist Urgent    Arrival Complaint    toe pain        Chief Complaint   Patient presents with    Fall     Pt referred by VNA, states fell yesterday and today at 1300, "I got dizzy and just went down", denies head strike, no thinners, referred for wound to L index and middle toe, edema and ecchymosis noted, reports history of falls r/t hypotenstion and hypokalemia       Assessment/Plan: * Symptomatic bradycardia  Assessment & Plan  Patient presents following two falls at home yesterday and today preceded by lightheadedness   Does have history of multiple hospitalizations due to falls at home   HR noted to fall to as low as 29 while in ED   Not currently on any beta blockage medications   Is on high dose opioid pain medication regimen   Monitor on telemetry   Consult to cardiology   Orthostatics daily      Fall at home  Assessment & Plan  Patient presents following two falls at home yesterday and today  Reports he believes he stood up too quickly, became lightheaded and fell   HR noted to fall as low at 29 in ED, will monitor on telemetry for consideration of symptomatic bradycardia  Orthostatic VS daily   PT/OT consults   Consider need for acute rehab, consult to case management      Essential hypertension  Assessment & Plan  History of HTN, reports history of multiple hypotensive episodes in the past few months  Recently taken off all hypertensive medications   BP controlled at this time  Orthostatics daily   Continue to monitor and avoid hypotension   Pressure injury of skin of sacral region  Assessment & Plan  Known sacral decubitus ulcer   Has wound care coming to house every other day   Continue local wound care and frequent turns while inpatient     Stage 3 chronic kidney disease ([facility] [address] )  Assessment & Plan  Creatinine at baseline at time of admission  Avoid hypotension and nephrotoxins         Chronic pain disorder  Assessment & Plan  Reports history of chronic pain disorder   Continue home regimen of MS Contin 60 MG bid with PRN Oxycodone 30 mg q 6   Up with assistance  PT/OT consults    Chronic diastolic congestive heart failure ([facility] [address] )  Type 2 diabetes mellitus with renal complication ([facility]   Anticipated Length of Stay:  Patient will be admitted on an Inpatient basis with an anticipated length of stay of  More than 2 midnights  Justification for Hospital Stay: bradycardia, falls at home     Asim Felder is a 66 y o  male with a PMHx of HTN, HLD, CAD, sacral ulcer, DM on long term insulin who presents with fall at home and foot pain  Patient reports he was at home yesterday when he got off his chair quickly then walked to the front door, became lightheaded and fell  Patient reports he passed out and does not remember events of what happened prior to his family getting him up off the floor  Patient reports similar episode occurring today when his son slammed a door in the house which startled patient, he got up quickly and passed out again   Patient reports history of multiple falls recently, reports he was taken off all BP medications and spend time in rehab to regain strength  Patient walks with a rolling walker at baseline  Patient also reports bruising of left 2nd and 3rd toes reporting he fell on these toes when he fell  Patient reports he has little feeling in his feet but reports some pain in his foot  Patient denies lightheadedness or dizziness at time of admission at rest  Patient denies any chest pain or shortness of breath  Per  Cardiology  Consult:    Frequent falls/ syncope/ ambulatory dysfunction              -primary reason for admission     2  Bradycardia              -primary reason for consultation              -heart rate trending 50-70 on telemetry, presently in 80's              -under sensing with falsely low readings of HR in 30's     3   Coronary artery disease, multiple catheter based interventions              -previous MI in 2012 (details unclear)              -PCI ostial & pLCX (01/2015)              -PCI/OMER to proximal LAD and distal circumflex (01/2016)              -PCI/OMER to mid RCA (11/2017)          ED Triage Vitals [08/08/19 1544]   Temperature Pulse Respirations Blood Pressure SpO2   97 8 °F (36 6 °C) 61 16 120/72 97 %      Temp Source Heart Rate Source Patient Position - Orthostatic VS BP Location FiO2 (%)   Oral Monitor Sitting Right arm --      Pain Score       9        Wt Readings from Last 1 Encounters:   08/08/19 108 kg (239 lb)     Additional Vital Signs:   08/09/19 1100  98 5 °F (36 9 °C)  96  18  103/58  97 %  None (Room air)  Sitting   08/09/19 0849            None (Room air)     08/09/19 0700  98 5 °F (36 9 °C)  75  20  124/94  94 %    Lying   08/08/19 2320  97 9 °F (36 6 °C)  52Abnormal   18  110/68  93 %  None (Room air)  Lying   08/08/19 2030  96 6 °F (35 9 °C)Abnormal   50Abnormal     135/65  98 %  None (Room air)  Lying   08/08/19 1931    56  18  121/58  97 %  None (Room air)  Lying   08/08/19 1827    48Abnormal   18  118/60  98 %  None (Room air)  Lying   08/08/19 1738    52Abnormal   18  118/52  97 %  None (Room air)  Lying   08/08/19 1638    54Abnormal   18  101/53  97 %  None (Room air)  Lying   08/08/19 1630            None (Room air)     08/08/19 1544  97 8 °F (36 6 °C)  61  16  120/72  97 %             Pertinent Labs/Diagnostic Test Results:   Results from last 7 days   Lab Units 08/09/19  0529 08/08/19  1626   WBC Thousand/uL 8 47 11 23*   HEMOGLOBIN g/dL 11 4* 12 6   HEMATOCRIT % 37 2 41 4   PLATELETS Thousands/uL 155 214   NEUTROS ABS Thousands/µL  --  8 32*         Results from last 7 days   Lab Units 08/09/19  0529 08/08/19  1626   SODIUM mmol/L 143 142   POTASSIUM mmol/L 4 3 4 8   CHLORIDE mmol/L 110* 108   CO2 mmol/L 24 28   ANION GAP mmol/L 9 6   BUN mg/dL 7 8   CREATININE mg/dL 0 84 1 05   EGFR ml/min/1 73sq m 98 79   CALCIUM mg/dL 8 4 8 8   MAGNESIUM mg/dL 2 1 2 0     Results from last 7 days   Lab Units 08/08/19  1626   AST U/L 13   ALT U/L 17   ALK PHOS U/L 107   TOTAL PROTEIN g/dL 6 7   ALBUMIN g/dL 2 8*   TOTAL BILIRUBIN mg/dL 0 40     Results from last 7 days   Lab Units 08/09/19  1137 08/09/19  0808 08/08/19  2124   POC GLUCOSE mg/dl 166* 97 82     Results from last 7 days   Lab Units 08/09/19  0529 08/08/19  1626   GLUCOSE RANDOM mg/dL 73 94           Results from last 7 days   Lab Units 08/08/19  1626   TSH 3RD GENERATON uIU/mL 1 875                     Results from last 7 days   Lab Units 08/08/19  1626   NT-PRO BNP pg/mL 316*             Results from last 7 days   Lab Units 08/08/19  1626   LIPASE u/L 49*             Results from last 7 days   Lab Units 08/08/19  2034   CLARITY UA  Clear   COLOR UA  Yellow   SPEC GRAV UA  1 010   PH UA  5 5   GLUCOSE UA mg/dl Negative   KETONES UA mg/dl Negative   BLOOD UA  Negative   PROTEIN UA mg/dl Negative   NITRITE UA  Negative   BILIRUBIN UA  Negative   UROBILINOGEN UA E U /dl 4 0*   LEUKOCYTES UA  Negative         Ct head:  No acute  Intracranial abnormality  Ct  C/spine:  No fracture  CXR:  NAD  CT  Abd/pelvis:      No visceral or osseous injury identified        Skin thickening in the region of the umbilicus which may represent inflammation        Significant skin thickening overlying lower sacrum/coccyx and the upper gluteal fold  No obvious abscess on this noncontrast CT  Please clinically evaluate for sacral decubitus        Coronary and aortic atherosclerosis  Fluid-filled esophagus suggesting gastroesophageal reflux  Mild hepatosplenomegaly  Colonic diverticulosis without evidence of diverticulitis     EKst  Degree  AV   R bundle  L posterior  fascicular       ED Treatment:   Medication Administration from 2019 1539 to 2019 1953       Date/Time Order Dose Route Action Action by Comments     2019 1842 sodium chloride 0 9 % bolus 1,000 mL 0 mL Intravenous Stopped Irene R Sep      2019 1626 sodium chloride 0 9 % bolus 1,000 mL 1,000 mL Intravenous New Bag Irene R Sep      2019 1728 iohexol (OMNIPAQUE) 350 MG/ML injection (MULTI-DOSE) 100 mL 100 mL Intravenous Given Kalin Figueroa         Past Medical History:   Diagnosis Date    Acute on chronic diastolic congestive heart failure (HCC)     Altered gait     Alzheimer disease     per patients,,early onset     Angina pectoris ([address] )     Anxiety     Arthritis     Brain aneurysm     coils placed    Cardiac disease     Chest pain 2016    Chronic pain     back/ right groin and rle- has morphine pump    Constipation     COPD (chronic obstructive pulmonary disease) (HCC)     Coronary artery disease     CPAP (continuous positive airway pressure) dependence     Decubital ulcer     sacral decub-occured 2019-sees wound care/debide in OR today 2019    Dependent on walker for ambulation     w/c for long distance    Depression     Diabetes mellitus (HCC)     insulin dependent    Dizziness     occ    Dysphagia     Enlarged prostate     Fall     GERD (gastroesophageal reflux disease)     Heart failure (HCC)     Hiatal hernia     Hx of gastric bypass 11/19/2018    Hypercholesterolemia     Hypertension     MI (myocardial infarction) ([facility] )     2017- stents x2    Migraine     Morbid obesity ([facility] )     gastric bypass sleeve 11/2018-wt loss 125 lb    Neuropathy     Oxygen dependent     Q HS  2LPM with CPAP and prn during day 2-3 LPM     Renal disorder     Shortness of breath     Skin abnormality     sacral wound - covered with pad    Sleep apnea     Stented coronary artery     Stroke ([facility] )     vision loss b/l  2005, residual R leg weakness    Urinary frequency     Use of cane as ambulatory aid     Wears dentures     Wears glasses     Wears glasses     Wheelchair dependent      Present on Admission:   Essential hypertension   Type 2 diabetes mellitus with renal complication ([facility])   Chronic diastolic congestive heart failure ([facility])   Chronic pain disorder   Stage 3 chronic kidney disease ([facility])   Pressure injury of skin of sacral region      Admitting Diagnosis: Bradycardia [R00 1]  Toe pain [M79 676]  Ambulatory dysfunction [R26 2]  Age/Sex: 66 y o  male  Admission Orders:    Current Facility-Administered Medications:  acetaminophen 650 mg Oral Q6H PRN Amy Smudde, PA-C   albuterol 2 puff Inhalation Q6H PRN Amy Smudde, PA-C   albuterol 1 25 mg Nebulization Q6H PRN Amy Xu, PA-C   aluminum-magnesium hydroxide-simethicone 30 mL Oral Q6H PRN Amy Smudde, PA-C   aspirin 81 mg Oral Daily Amy Xu, PA-C   atorvastatin 40 mg Oral Daily With THIAGO Barkley, PA-C   baclofen 10 mg Oral TID Homero Mcnair, PA-C   calcium carbonate-vitamin D 2 tablet Oral Daily With Breakfast Amy Mcnair, PA-C   vitamin B-12 500 mcg Oral Daily Amy Xu, PA-C   fludrocortisone 0 1 mg Oral Daily Aisha Chandler MD   fluticasone-vilanterol 1 puff Inhalation Daily Amy Mcnair, PA-C   folic acid 3,894 mcg Oral Daily Amy Mcnair, PA-C   gabapentin 800 mg Oral BID Amy Xu, PA-C   heparin (porcine) 5,000 Units Subcutaneous Q8H [facility] Amy Mcnair, PA-C   hydrOXYzine HCL 50 mg Oral Q6H PRN Amy Smudde, PA-C   insulin glargine 35 Units Subcutaneous QAM Amy Mcnair, PA-C   insulin lispro 1-5 Units Subcutaneous HS Amy Smhenryde, PA-C   insulin lispro 1-6 Units Subcutaneous TID AC Amy Mcnair, PA-C   lubiprostone 24 mcg Oral BID With Meals Domi Lee, PA-C   morphine 60 mg Oral Q12H [facility] Amy Mcnair, PA-C   multivitamin-minerals 1 tablet Oral Daily Amy Smudde, PA-C   nitroglycerin 0 4 mg Sublingual Q5 Min PRN Amy Smudde, PA-C   ondansetron 4 mg Intravenous Q6H PRN Domi Lee, PA-C   oxyCODONE 30 mg Oral Q6H PRN Amy Smudde, PA-C   pantoprazole 40 mg Oral Early Morning Amy Smudde, PA-C   polyethylene glycol 17 g Oral Daily PRN Amy Smudde, PA-C   potassium chloride 40 mEq Oral Daily Amy Smudde, PA-C   prochlorperazine 10 mg Oral Q6H PRN Amy Smudde, PA-C   traZODone 100 mg Oral HS Amy Smudde, PA-C   zolpidem 10 mg Oral HS PRN Amy Smudde, PA-C       IP CONSULT TO CARDIOLOGY  IP CONSULT TO CASE MANAGEMENT  IP CONSULT TO WOUND CARE     2 DE  tele    Network Utilization Review Department  Phone: [phone]; Fax [phone]  [email or web address]  ATTENTION: Please call with any questions or concerns to [phone]  and carefully listen to the prompts so that you are directed to the right person  Send all requests for admission clinical reviews, approved or denied determinations and any other requests to fax [phone]   All voicemails are confidential

## 2019-08-09 NOTE — PHYSICAL THERAPY NOTE
Physical Therapy Evaluation     Patient's Name: Willie Mckeon    Admitting Diagnosis  Bradycardia [R00 1]  Toe pain [M79 676]  Ambulatory dysfunction [R26 2]    Problem List  Patient Active Problem List   Diagnosis    Essential hypertension    Type 2 diabetes mellitus with renal complication ([place] [address] )    Chronic diastolic congestive heart failure ([place] [address] )    Coronary artery disease involving native coronary artery    Morbid obesity ([place] [address] )    CVA (cerebral vascular accident) ([place] [address] )    CHF (congestive heart failure) ([place] [address] )    Stroke ([place] [address] )    Stented coronary artery    Obstructive sleep apnea syndrome    MI (myocardial infarction) ([place] [address] )    Depression    CPAP (continuous positive airway pressure) dependence    Brain aneurysm    Hypokalemia    Alzheimer disease    Acute on chronic diastolic congestive heart failure ([facility])    Chronic pain disorder    Constipation    Coronary artery disease    Type 2 diabetes mellitus with hyperglycemia, with long-term current use of insulin ([facility])    Sleep apnea    Acute tracheobronchitis    Dyspnea on exertion    Bilateral leg edema    Chronic respiratory failure ([facility])    Chest pain    Stage 3 chronic kidney disease ([facility])    Leukocytosis    GERD (gastroesophageal reflux disease)    Acute renal failure superimposed on stage 3 chronic kidney disease ([facility])    Opioid dependence ([place] [address] )    Esophageal dysphagia    Chronic migraine without aura without status migrainosus, not intractable    Hyperlipidemia    Vitamin D deficiency    Dysphagia    Fall at home    Closed nondisplaced fracture of body of left calcaneus    Pain and swelling of left knee    Pressure injury of skin of sacral region    Symptomatic bradycardia       Past Medical History  Past Medical History:   Diagnosis Date    Acute on chronic diastolic congestive heart failure (HCC)     Altered gait     Alzheimer disease     per patients,,early onset     Angina pectoris ([facility])     Anxiety  Arthritis     Brain aneurysm     coils placed    Cardiac disease     Chest pain 1/13/2016    Chronic pain     back/ right groin and rle- has morphine pump    Constipation     COPD (chronic obstructive pulmonary disease) (HCC)     Coronary artery disease     CPAP (continuous positive airway pressure) dependence     Decubital ulcer     sacral decub-occured 5/2019-sees wound care/debide in OR today 6/6/2019    Dependent on walker for ambulation     w/c for long distance    Depression     Diabetes mellitus (HCC)     insulin dependent    Dizziness     occ    Dysphagia     Enlarged prostate     Fall     GERD (gastroesophageal reflux disease)     Heart failure ([address] )     Hiatal hernia     Hx of gastric bypass 11/19/2018    Hypercholesterolemia     Hypertension     [facility] infarction) [facility] )     2017- stents x2    Migraine     Morbid obesity ([facility] )     gastric bypass sleeve 11/2018-wt loss 125 lb    Neuropathy     Oxygen dependent     Q HS  2LPM with CPAP and prn during day 2-3 LPM     Renal disorder     Shortness of breath     Skin abnormality     sacral wound - covered with pad    Sleep apnea     Stented coronary artery     Stroke ([facility] )     vision loss b/l  2005, residual R leg weakness    Urinary frequency     Use of cane as ambulatory aid     Wears dentures     Wears glasses     Wears glasses     Wheelchair dependent        Past Surgical History  Past Surgical History:   Procedure Laterality Date    BACK SURGERY      BRAIN SURGERY      CARDIAC CATHETERIZATION      with stents    CEREBRAL ANEURYSM REPAIR      with coils    COLONOSCOPY      ESOPHAGOGASTRODUODENOSCOPY N/A 7/1/2016    Procedure: ESOPHAGOGASTRODUODENOSCOPY (EGD); Surgeon: Elaine Bautista MD;  [facility];   Service:     GASTRIC BYPASS  11/19/2018    HERNIA REPAIR      HIATAL HERNIA REPAIR      INFUSION PUMP IMPLANTATION Left     morphine    KNEE ARTHROSCOPY Right     KNEE ARTHROSCOPY Right     PERONEAL NERVE DECOMPRESSION Right     MD DEBRIDEMENT OPEN WOUND 20 SQ CM< N/A 6/6/2019    Procedure: EXCISIONAL DEBRIDEMENT OF SACRAL DECUBITUS ULCER;  Surgeon: Nury Escamilla MD;  [facility];  Service: General    MD ESOPHAGOGASTRODUODENOSCOPY TRANSORAL DIAGNOSTIC N/A 2/27/2017    Procedure: ESOPHAGOGASTRODUODENOSCOPY (EGD); Surgeon: Merlin Lulas, MD;  [facility]: [facility]; Service: Gastroenterology    MD ESOPHAGOGASTRODUODENOSCOPY TRANSORAL DIAGNOSTIC N/A 8/23/2018    Procedure: ESOPHAGOGASTRODUODENOSCOPY (EGD) with biopsy;  Surgeon: Merlin Lulas, MD;  [facility]; Service: Gastroenterology    MD INSERT SPINE INFUSN [address] [place] [address] N/A 1/19/2017    Procedure: REMOVAL / EXCHANGE INTRATHECAL PAIN PUMP;  Surgeon: Vidhi Roy MD;  [facility];  Service: Orthopedics    MD INSERT SPINE INFUSN [address] [place] [address] N/A 5/16/2016    Procedure: REPLACEMENT AND PROGRAM PUMP ;  Surgeon: Vidhi Roy MD;  [facility];  Service: Orthopedics        08/09/19 1320   Note Type   Note type Eval only   Pain Assessment   Pain Assessment 0-10   Pain Score Worst Possible Pain   Pain Type Acute pain   Pain Location Back;Leg;Knee; Foot   Pain Orientation Bilateral   Home Living   Type of [address] [place] [address] Multi-level;1/2 bath on main level;Bed/bath upstairs;Stairs to enter with rails  (has hospital bed on first floor )   Bathroom Shower/Tub   (has been sponge bathing on first floor for a few months)   [address] Walker;Cane   Additional Comments pt reports amb with [facility] PTA, multiple falls, reports 3 sons help him at home including on QUE   Prior Function   Level of [place] Needs assistance with IADLs; Needs assistance with ADLs and functional mobility  (wife helping with sponge bathing, frequent falls)   Lives With Spouse;Son  (3 sons)   Receives Help From Family;Home health   ADL Assistance Needs assistance   IADLs Needs assistance   Falls in the last 6 months 5 to 10   Vocational On disability   Comments pt reports LH and dizziness prior to falls; feels weak and has a lot of pain in LEs and sacrum   Restrictions/Precautions   Weight Bearing Precautions Per Order No   Braces or Orthoses   (none)   Other Precautions Pain; Fall Risk;Telemetry;Multiple lines; Bed Alarm   General   Family/Caregiver Present No   Cognition   Overall Cognitive Status WFL   Arousal/Participation Cooperative   Attention Attends with cues to redirect   Orientation Level Oriented X4   Memory Decreased recall of precautions   Following Commands Follows one step commands with increased time or repetition   RLE Assessment   RLE Assessment X   Strength RLE   RLE Overall Strength 3-/5   LLE Assessment   LLE Assessment X   Strength LLE   LLE Overall Strength 3-/5   Bed Mobility   Additional Comments pt received seated EOB finishing lunch   Transfers   Sit to Stand 4  Minimal assistance   Additional items Assist x 1;Bedrails; Increased time required;Verbal cues   Stand to Sit 4  Minimal assistance   Additional items Assist x 1;Bedrails; Increased time required;Verbal cues   Ambulation/Elevation   Gait pattern Excessively slow; Short stride; Foward flexed;Decreased foot clearance; Improper Weight shift   Gait Assistance 3  Moderate assist   Additional items Assist x 1;Verbal cues; Tactile cues   Assistive Device Rolling walker   Distance 40'   Stair Management Assistance Not tested   Balance   Static Sitting Good   Dynamic Sitting Fair   Static Standing Fair -   Dynamic Standing Poor +   Ambulatory Poor   Endurance Deficit   Endurance Deficit Yes   Activity Tolerance   Activity Tolerance Patient limited by fatigue;Patient limited by pain   Nurse Made Aware ok to see per Erin HART   Assessment   Prognosis Good   Problem List Decreased strength;Decreased range of motion;Decreased endurance; Impaired balance;Decreased mobility; Impaired judgement;Decreased skin integrity;Pain   Assessment Pt is 66 y o  male seen for PT evaluation s/p admit to [address] [address] on 8/8/2019 w/ Symptomatic bradycardia  PT consulted to assess pt's functional mobility and d/c needs  Order placed for PT eval and tx, w/ up w/ A order  Comorbidities affecting pt's physical performance at time of assessment include: HTN, DMII, dCHF, CKDstageIII, chronic pain, multiple falls including one with hospitalization 5/2019, sacral decubitus, Cad, CVA, obesity  PTA, pt was lives w/ his wife and 3 sons in a multilevel  level home with hospital bed and half bath on first floor and on disability  Personal factors affecting pt at time of IE include: stairs to enter home, inability to ambulate household distances, positive fall history and sacral wound  Please find objective findings from PT assessment regarding body systems outlined above with impairments and limitations including weakness, decreased ROM, impaired balance, decreased endurance, gait deviations, pain, decreased activity tolerance, decreased functional mobility tolerance, impaired judgement, fall risk and decreased skin integrity  The following objective measures performed on IE also reveal limitations: Barthel Index: 50/100  Pt's clinical presentation is currently unstable/unpredictable seen in pt's presentation of frequent falls with unknown etiology, significant deconditioning and weakness requiring modA for safety with short distance ambulation, not safe to trial stairs  Pt to benefit from continued PT tx to address deficits as defined above and maximize level of functional independent mobility and consistency  From PT/mobility standpoint, recommendation at time of d/c would be IP rehab pending progress in order to facilitate return to PLOF     Barriers to Discharge Inaccessible home environment   Goals   Patient Goals to go to rehab   STG Expiration Date 08/19/19   Short Term Goal #1 In 10 days, Juan Hernandez will demonstrate 1) bed mobility at mod I level in order to get in/out of bed safely, 2) sit<>stand at close S level in order to rise/sit from bed, chair, and complete toileting, 3) ambulate 150 feet [facility] level in order to access their home environment, 4) negotiate 5 stairs with 1 railing at Kathleen level in order to safely enter/exit their home, 5) participation in HEP to include but not limited to strengthening, stretching, endurance, balance, and coordination in order to facilitate progress toward the above goals  Treatment Day 0   Plan   Treatment/Interventions Functional transfer training;LE strengthening/ROM; Elevations; Therapeutic exercise; Endurance training;Patient/family training;Equipment eval/education; Bed mobility;Gait training   PT Frequency   (3-5x/wk)   Recommendation   Recommendation Short-term skilled PT   Equipment Recommended Walker   PT - OK to Discharge Yes  (to rehab when medically appropriate)   Barthel Index   Feeding 10   Bathing 0   Grooming Score 0   Dressing Score 5   Bladder Score 10   Bowels Score 10   Toilet Use Score 5   Transfers (Bed/Chair) Score 10   Mobility (Level Surface) Score 0   Stairs Score 0   Barthel Index Score 50           Osiris Gooden, PT

## 2019-08-09 NOTE — PLAN OF CARE
Problem: PHYSICAL THERAPY ADULT  Goal: Performs mobility at highest level of function for planned discharge setting  See evaluation for individualized goals  Description  Treatment/Interventions: Functional transfer training, LE strengthening/ROM, Elevations, Therapeutic exercise, Endurance training, Patient/family training, Equipment eval/education, Bed mobility, Gait training  Equipment Recommended: [employer]       See flowsheet documentation for full assessment, interventions and recommendations  Note:   Prognosis: Good  Problem List: Decreased strength, Decreased range of motion, Decreased endurance, Impaired balance, Decreased mobility, Impaired judgement, Decreased skin integrity, Pain  Assessment: Pt is 66 y o  male seen for PT evaluation s/p admit to [address] on 8/8/2019 w/ Symptomatic bradycardia  PT consulted to assess pt's functional mobility and d/c needs  Order placed for PT eval and tx, w/ up w/ A order  Comorbidities affecting pt's physical performance at time of assessment include: HTN, DMII, dCHF, CKDstageIII, chronic pain, multiple falls including one with hospitalization 5/2019, sacral decubitus, Cad, CVA, obesity  PTA, pt was lives w/ his wife and 3 sons in a multilevel  level home with hospital bed and half bath on first floor and on disability  Personal factors affecting pt at time of IE include: stairs to enter home, inability to ambulate household distances, positive fall history and sacral wound  Please find objective findings from PT assessment regarding body systems outlined above with impairments and limitations including weakness, decreased ROM, impaired balance, decreased endurance, gait deviations, pain, decreased activity tolerance, decreased functional mobility tolerance, impaired judgement, fall risk and decreased skin integrity  The following objective measures performed on IE also reveal limitations: Barthel Index: 50/100   Pt's clinical presentation is currently unstable/unpredictable seen in pt's presentation of frequent falls with unknown etiology, significant deconditioning and weakness requiring modA for safety with short distance ambulation, not safe to trial stairs  Pt to benefit from continued PT tx to address deficits as defined above and maximize level of functional independent mobility and consistency  From PT/mobility standpoint, recommendation at time of d/c would be IP rehab pending progress in order to facilitate return to PLOF  Barriers to Discharge: Inaccessible home environment     Recommendation: Short-term skilled PT     PT - OK to Discharge: Yes(to rehab when medically appropriate)    See flowsheet documentation for full assessment

## 2019-08-09 NOTE — ASSESSMENT & PLAN NOTE
Known sacral decubitus ulcer ,Present on admission 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  Has wound care coming to house every other day   Continue local wound care and frequent turns while inpatient

## 2019-08-09 NOTE — ASSESSMENT & PLAN NOTE
Wt Readings from Last 3 Encounters:   08/08/19 108 kg (239 lb)   06/25/19 111 kg (244 lb 6 4 oz)   06/12/19 117 kg (259 lb)   Currently euvolemic   Last Echo showing EF of 60%   Recently taken off torsemide for hypotension  Avoid excessive IV fluid hydration

## 2019-08-09 NOTE — DISCHARGE INSTR - OTHER ORDERS
Wound Care:  1-Hydraguard lotion to bilateral heels BID and PRN  2-Elevate heels to offload pressure  3-Offloading cushion in chair when out of bed  4-Moisturize skin daily with skin nourishing cream   5-Turn/reposition every 2 hours for pressure re-distribution on skin--offload sacrum as often as possible  6-Low air-loss mattress  7-Sacral wound--cleanse with normal saline, pat dry  3m Cavilon no sting skin barrier film to shae-wound skin  Pack sacral wound with saline moistened 1/2 inch plain packing  Cover with ABD and secure with tape  Change dressing daily  Follow-up at wound center--[phone]

## 2019-08-09 NOTE — ASSESSMENT & PLAN NOTE
Patient presents following two falls at home yesterday and today preceded by lightheadedness     Patient continues to complain of lightheadedness with activity, patient cannot even get out of bed without feeling lightheaded and dizzy    Does have history of multiple hospitalizations due to falls at home   HR noted to fall to as low as 29 while in ED      Not currently on any beta-blockers  Is on high dose opioid pain medication regimen   Monitor on telemetry   Appreciate Cardiology evaluation  Orthostatics daily     Florinef initiated today by Cardiology, dose given

## 2019-08-09 NOTE — ASSESSMENT & PLAN NOTE
Lab Results   Component Value Date    HGBA1C 7 6 08/15/2018       Recent Labs     08/08/19  2124 08/09/19  0808 08/09/19  1137 08/09/19  1550   POCGLU 82 97 166* 98       Blood Sugar Average: Last 72 hrs:  (P) 110 75Continue home 35 units QAM as he does at home  SSI coverage with ACCU checks   Diabetic diet     Check hemoglobin A1c

## 2019-08-09 NOTE — PLAN OF CARE
Problem: OCCUPATIONAL THERAPY ADULT  Goal: Performs self-care activities at highest level of function for planned discharge setting  See evaluation for individualized goals  Description  Treatment Interventions: ADL retraining, Cognitive reorientation, Endurance training, UE strengthening/ROM, Functional transfer training, Patient/family training, Equipment evaluation/education, Compensatory technique education, Energy conservation, Activityengagement          See flowsheet documentation for full assessment, interventions and recommendations  Note:   Limitation: Decreased ADL status, Decreased Safe judgement during ADL, Decreased UE strength, Decreased cognition, Decreased endurance, Decreased self-care trans, Decreased high-level ADLs, Decreased sensation  Prognosis: Good, Fair  Assessment: Pt is a 66 y o  male seen for OT evaluation s/p admit to [facility] on [facility]/8/2019 w/ Symptomatic bradycardia, dizziness and falls  Ct spine cervical (-) acute, XRay foot (-)  Comorbidities affecting pt's functional performance at time of assessment include:  CHF, CKD III, CAD, chronic pain disorder, DM II  Personal factors affecting pt at time of IE include:dizziness, reports over 9 falls  Prior to admission, pt was living w/ spouse and sons and reports independent w/ ADLs (up until recently assist from wife), independent w/ functional transfers and mobility w/ rollator in community and [facility] in home, assist IADLs   Upon evaluation: Pt requires MIN assist supine<>sit bed mobility w/ log rolling techniques, MIN assist sit<>stand w/VCs for safety, MOD assist LB ADLs, MIN assist UB ADLs, MIN-MOD assist toileting 2* the following deficits impacting occupational performance: impaired skin integrity w/ wound on bottom, increased pain in LEs and back, impaired balance, impaired functional reach, impaired activity tolerance, dizziness (BP supine: 100/48, EOB: 93/59, standing and returns to seated positioning 97/62), increased processing time, lethargy  Pt to benefit from continued skilled OT tx while in the hospital to address deficits as defined above and maximize level of functional independence w ADL's and functional mobility  Occupational Performance areas to address include: grooming, bathing/shower, toilet hygiene, dressing, health maintenance, functional mobility and clothing management  From OT standpoint, recommendation at time of d/c would be short term rehab  Pt w/ bed alarm intact end of session        OT Discharge Recommendation: Short Term Rehab  OT - OK to Discharge: (to rehab when medically stable)

## 2019-08-09 NOTE — PROGRESS NOTES
Progress Note - Oliverio Saavedra 1960, 66 y o  male MRN: [id number]    Unit/Bed#: E4 -01 Encounter: [id number]    Primary Care Provider: Sravanthi Hensley MD   Date and time admitted to hospital: 8/8/2019  3:45 PM        * Symptomatic bradycardia  Assessment & Plan  Patient presents following two falls at home yesterday and today preceded by lightheadedness     Patient continues to complain of lightheadedness with activity, patient cannot even get out of bed without feeling lightheaded and dizzy    Does have history of multiple hospitalizations due to falls at home   HR noted to fall to as low as 29 while in ED      Not currently on any beta-blockers  Is on high dose opioid pain medication regimen   Monitor on telemetry   Appreciate Cardiology evaluation  Orthostatics daily     Florinef initiated today by Cardiology, dose given    Fall at home  [facility]  Patient presents following two falls at home yesterday and today  Reports he believes he stood up too quickly, became lightheaded and fell   HR noted to fall as low at 29 in ED, will monitor on telemetry for consideration of symptomatic bradycardia  Orthostatic VS daily   PT/OT consults   Consider need for acute rehab, consult to case management     Stage 3 chronic kidney disease ([facility] [address] )  Assessment & Plan  Renal function appears to be better than baseline  Avoid hypotension and nephrotoxins       Chronic pain disorder  Assessment & Plan  Reports history of chronic pain disorder   Continue home regimen of MS Contin 60 MG bid with PRN Oxycodone 30 mg q 6   Up with assistance  PT/OT consults     Chronic diastolic congestive heart failure (HCC)  Assessment & Plan  Wt Readings from Last 3 Encounters:   08/08/19 108 kg (239 lb)   06/25/19 111 kg (244 lb 6 4 oz)   06/12/19 117 kg (259 lb)   Currently euvolemic   Last Echo showing EF of 60%   Recently taken off torsemide for hypotension  Avoid excessive IV fluid hydration     Type 2 diabetes mellitus with renal complication [facility])  Assessment & Plan  Lab Results   Component Value Date    HGBA1C 7 6 08/15/2018       Recent Labs     19  2124 19  0808 19  1137 19  1550   POCGLU 82 97 166* 98       Blood Sugar Average: Last 72 hrs:  (P) 110 75Continue home 35 units QAM as he does at home  SSI coverage with ACCU checks   Diabetic diet     Check hemoglobin A1c    Pressure injury of skin of sacral region  Assessment & Plan  Known sacral decubitus ulcer ,Present on admission 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  Has wound care coming to house every other day   Continue local wound care and frequent turns while inpatient            Code Status: Level 1 - Full Code      Subjective:   Patient reports severe fatigue, patient reports continued dizziness with minimal activity  Objective:     Vitals:   Temp (24hrs), Av 9 °F (36 6 °C), Min:96 6 °F (35 9 °C), Max:98 5 °F (36 9 °C)    Temp:  [96 6 °F (35 9 °C)-98 5 °F (36 9 °C)] 98 °F (36 7 °C)  HR:  [48-96] 67  Resp:  [18-20] 18  BP: ()/(52-94) 96/52  SpO2:  [93 %-98 %] 97 %  Body mass index is 32 41 kg/m²  Input and Output Summary (last 24 hours): Intake/Output Summary (Last 24 hours) at 2019 1755  Last data filed at 2019 1547  Gross per 24 hour   Intake 1480 ml   Output 2400 ml   Net -920 ml       Physical Exam:     Physical Exam   Constitutional: He is oriented to person, place, and time  Chronically ill-appearing male, appears older than stated age   HENT:   Head: Normocephalic and atraumatic  Right Ear: External ear normal    Left Ear: External ear normal    Cardiovascular: Normal rate, regular rhythm, normal heart sounds and intact distal pulses  Pulmonary/Chest: Effort normal and breath sounds normal  No stridor  No respiratory distress  Abdominal: Soft  Bowel sounds are normal  He exhibits no distension  There is no tenderness     Musculoskeletal:   Left foot with ecchymotic toe Neurological: He is alert and oriented to person, place, and time  No cranial nerve deficit  Coordination normal    Skin: Skin is warm and dry  Nursing note and vitals reviewed  Additional Data:     Labs:    Results from last 7 days   Lab Units 08/09/19  0529 08/08/19  1626   WBC Thousand/uL 8 47 11 23*   HEMOGLOBIN g/dL 11 4* 12 6   HEMATOCRIT % 37 2 41 4   PLATELETS Thousands/uL 155 214   NEUTROS PCT %  --  75   LYMPHS PCT %  --  14   MONOS PCT %  --  6   EOS PCT %  --  5     Results from last 7 days   Lab Units 08/09/19  0529 08/08/19  1626   POTASSIUM mmol/L 4 3 4 8   CHLORIDE mmol/L 110* 108   CO2 mmol/L 24 28   BUN mg/dL 7 8   CREATININE mg/dL 0 84 1 05   CALCIUM mg/dL 8 4 8 8   ALK PHOS U/L  --  107   ALT U/L  --  17   AST U/L  --  13           * I Have Reviewed All Lab Data Listed Above  * Additional Pertinent Lab Tests Reviewed:  [address] Admission Reviewed      Recent Cultures (last 7 days):           Last 24 Hours Medication List:     Current Facility-Administered Medications:  acetaminophen 650 mg Oral Q6H PRN Amy Smudde, PA-C   albuterol 2 puff Inhalation Q6H PRN Amy Smudde, PA-C   albuterol 1 25 mg Nebulization Q6H PRN Amy Smudde, PA-C   aluminum-magnesium hydroxide-simethicone 30 mL Oral Q6H PRN Amy Smudde, PA-C   aspirin 81 mg Oral Daily Amy Smudde, PA-C   atorvastatin 40 mg Oral Daily With THIAGO Barkley, PA-C   baclofen 10 mg Oral TID [place] Card Smudde, PA-C   calcium carbonate-vitamin D 2 tablet Oral Daily With Breakfast Amy Smudde, PA-C   vitamin B-12 500 mcg Oral Daily Amy Smudde, PA-C   fludrocortisone 0 1 mg Oral Daily Lyndsey Chavez MD   fluticasone-vilanterol 1 puff Inhalation Daily Amy Smudde, PA-C   folic acid 2,507 mcg Oral Daily Amy Smudde, PA-C   gabapentin 800 mg Oral BID Amy Smudde, PA-C   heparin (porcine) 5,000 Units Subcutaneous Q8H [facility] intermediate Amy Mcnair PA-C   hydrOXYzine HCL 25 mg Oral Q6H PRN Cecille Villanueva DO insulin glargine 35 Units Subcutaneous QAM Amy Smudde, PA-THIAGO   insulin lispro 1-5 Units Subcutaneous HS Amy Smudde, JANNA   insulin lispro 1-6 Units Subcutaneous TID AC Amy Smudde, JANNA   lubiprostone 24 mcg Oral BID With Meals [employer], JANNA   morphine 60 mg Oral Q12H [address] Amy Smudde, JANNA   multivitamin-minerals 1 tablet Oral Daily Amy Smudde, JANNA   ondansetron 4 mg Intravenous Q6H PRN Amy Smudde, JANNA   oxyCODONE 30 mg Oral Q6H PRN Amy Smudde, JANNA   pantoprazole 40 mg Oral Early Morning Amy Smudde, JANNA   polyethylene glycol 17 g Oral Daily PRN Amy Smudde, JANNA   potassium chloride 40 mEq Oral Daily Amy Smudde, JANNA   prochlorperazine 5 mg Oral Q6H PRN Alex Salazar DO   traZODone 100 mg Oral HS Amy Smudde, JANNA   zolpidem 10 mg Oral HS PRN [employer], PADARA        Today, Patient Was Seen By: Alex Salazar DO

## 2019-08-09 NOTE — QUICK NOTE
Non-billable note:    Orthostatics obtained via manual BP  Laying 108/76  Sitting 88/70  Standing 76/64  Will continue to monitor with addition of Florinef (first dose was today)

## 2019-08-09 NOTE — CONSULTS
Consult - Cardiology   Cheng Tran 66 y o  male MRN: [id number]  Unit/Bed#: E4 -01 Encounter: [id number]        Reason For Consult: Falls, bradycardia               ASSESSMENT:  1  Frequent falls/ syncope/ ambulatory dysfunction   -primary reason for admission    2  Bradycardia   -primary reason for consultation   -heart rate trending 50-70 on telemetry, presently in 80's   -under sensing with falsely low readings of HR in 30's    3  Coronary artery disease, multiple catheter based interventions   -previous MI in 2012 (details unclear)   -PCI ostial & pLCX (01/2015)   -PCI/OMER to proximal LAD and distal circumflex (01/2016)   -PCI/OMER to mid RCA (11/2017)     4  Type 2 diabetes mellitus  5  Chronic pain  6  Sacral decubitus ulcer  7  Bariatric surgery 11/2018, subsequent 140 lb weight loss     8  Chronic diastolic congestive heart failure   -LVEF 60% 06/2017   -moderately increased LV wall thickness   -no outpatient diuretics   -baseline weight unclear (recent weight loss as above)    9  Trifascicular block on EKG:   -1st degree AV, right bundle, left posterior fascicular    10  Holter monitor 10/2017 without significant bradyarrhythmia or advanced HB   -average heart rate 66, minimum 57, maximum 82    PLAN:     1  Bradycardia:   -no objective evidence of significant bradycardia on telemetry thus far   -nocturnal HR in low 30s appears to be under sensing   -will check echocardiogram   -continue telemetry   -if no significant conduction disease recommend outpatient monitor (Cardionet vs Loop)  At the time of my consultation his heart rate was in the 80s demonstrating chronotropic competence    Looking at prior office visits (dating back several years) it seems his heart rate at times does trend in the 50-60 range though has been recorded as high as 80 on his recent cardiology visit in January 2019     2  Frequent falls/ syncope:   -as above do not think this is related to bradycardia   -likely multifactorial in the setting of physical deconditioning, poor p o  Intake since bariatric surgery, sedentary lifestyle, and generalized weakness    3  History of coronary artery disease:   -check echocardiogram   -continue aspirin/statin   -difficult to discern his chest pain symptoms, less likely angina as they are exacerbated with taking a deep breath and sometimes occur at rest    4  Chronic diastolic congestive heart failure:   -no acute exacerbation        History Of Present Illness: This is a 61-year-old gentleman with a past medical history including longstanding CAD with multiple cardiac caths beginning in 2012, severe physical deconditioning, longstanding type 2 diabetes mellitus, obesity s/p bariatric surgery in November 2018, recent significant weight loss, chronic diastolic congestive heart failure, and chronic pain with opioid dependence  He was last seen and evaluated by our group in November 2017 at which time he underwent an elective cardiac catheterization in the setting of unstable angina as an outpatient  He did have a drug-eluting stent placed to an 85% lesion of the proximal RCA  He has residual known 90% diffuse disease of the distal LAD  Prior stent to his left circumflex was patent at this time  He did develop transient renal insufficiency in the aftermath of his catheterization and was seen by Nephrology in consultation  He is mentally discharged in stable condition on November 7, 2017  Since this time he has follow-up on 2 occasions as an outpatient with [facility] with most recent office visit being in January 2019  His heart rate at this office visit was 63 beats per minute  Interval history: This gentleman is presently hospitalized due to multiple falls at home  The most recent one was yesterday August 8, 2019  He stood up off his couch around 1:00 p m  To answer the door and  a package has been delivered    He walked approximately 5 feet with the help of a cane during which time it sounds as if he had some mild dizziness  Upon opening the door and trying to sign for the package he became much more dizzy and had what sounds to be some fogginess in his head  He then fell to the ground landing on his backside  He did not lose consciousness or hit his head  His son, who was present for the entire time, attempted to help him up but could not lift him in for this reason the patient called back to the couch and eventually sat down  On August 7, 2019 this gentleman was laying on his couch when a family member slammed the door on the way out  He sat up suddenly and walk to the door at which time he again began to feel very dizzy and had a mechanical fall  He does not exactly remember the fall but denies hitting his head  He again had to crawl to the couch to get back on his feet  It sounds as if this is happened about 6 times in recent months  The patient also reports to me that he usually gets dizzy if he stands up too fast   Due to his bariatric surgery he has a very small amount of p o  Intake  He drinks only about 20 oz of water daily        Past Medical History:        Past Medical History:   Diagnosis Date    Acute on chronic diastolic congestive heart failure (HCC)     Altered gait     Alzheimer disease     per patients,,early onset     Angina pectoris ([facility] [address] )     Anxiety     Arthritis     Brain aneurysm     coils placed    Cardiac disease     Chest pain 1/13/2016    Chronic pain     back/ right groin and rle- has morphine pump    Constipation     COPD (chronic obstructive pulmonary disease) (HCC)     Coronary artery disease     CPAP (continuous positive airway pressure) dependence     Decubital ulcer     sacral decub-occured 5/2019-sees wound care/debide in OR today 6/6/2019    Dependent on walker for ambulation     w/c for long distance    Depression     Diabetes mellitus (HCC)     insulin dependent    Dizziness     occ    Dysphagia     Enlarged prostate     Fall     GERD (gastroesophageal reflux disease)     Heart failure (HCC)     Hiatal hernia     Hx of gastric bypass 11/19/2018    Hypercholesterolemia     Hypertension     [facility] )     2017- stents x2    Migraine     Morbid obesity ([facility] )     gastric bypass sleeve 11/2018-wt loss 125 lb    Neuropathy     Oxygen dependent     Q HS  2LPM with CPAP and prn during day 2-3 LPM     Renal disorder     Shortness of breath     Skin abnormality     sacral wound - covered with pad    Sleep apnea     Stented coronary artery     Stroke ([facility] )     vision loss b/l  2005, residual R leg weakness    Urinary frequency     Use of cane as ambulatory aid     Wears dentures     Wears glasses     Wears glasses     Wheelchair dependent       Past Surgical History:   Procedure Laterality Date    BACK SURGERY      BRAIN SURGERY      CARDIAC CATHETERIZATION      with stents    CEREBRAL ANEURYSM REPAIR      with coils    COLONOSCOPY      ESOPHAGOGASTRODUODENOSCOPY N/A 7/1/2016    Procedure: ESOPHAGOGASTRODUODENOSCOPY (EGD); Surgeon: Kathy Castillo MD;  [facility]; Service:     GASTRIC BYPASS  11/19/2018    HERNIA REPAIR      HIATAL HERNIA REPAIR      INFUSION PUMP IMPLANTATION Left     morphine    KNEE ARTHROSCOPY Right     KNEE ARTHROSCOPY Right     PERONEAL NERVE DECOMPRESSION Right     SC DEBRIDEMENT OPEN WOUND 20 SQ CM< N/A 6/6/2019    Procedure: EXCISIONAL DEBRIDEMENT OF SACRAL DECUBITUS ULCER;  Surgeon: Heath Gudino MD;  [facility];  Service: General    SC ESOPHAGOGASTRODUODENOSCOPY TRANSORAL DIAGNOSTIC N/A 2/27/2017    Procedure: ESOPHAGOGASTRODUODENOSCOPY (EGD); Surgeon: Kathy Castillo MD;  [facility];   Service: Gastroenterology    SC ESOPHAGOGASTRODUODENOSCOPY TRANSORAL DIAGNOSTIC N/A 8/23/2018    Procedure: ESOPHAGOGASTRODUODENOSCOPY (EGD) with biopsy;  Surgeon: Kathy Castillo MD;  [facility]; Service: Gastroenterology    AR INSERT SPINE INFUSN [facility] N/A 1/19/2017    Procedure: REMOVAL / EXCHANGE INTRATHECAL PAIN PUMP;  Surgeon: Benitez Morse MD;  [facility];  Service: Orthopedics    AR INSERT SPINE INFUSN [facility] N/A 5/16/2016    Procedure: REPLACEMENT AND PROGRAM PUMP ;  Surgeon: Benitez Morse MD;  [facility];  Service: Orthopedics        Allergy:        No Known Allergies    Medications:       Prior to Admission medications    Medication Sig Start Date End Date Taking? Authorizing Provider   ADVAIR DISKUS 500-50 MCG/DOSE inhaler Inhale 1 puff 2 (two) times a day 7/13/18  Yes Historical Provider, MD   albuterol (ACCUNEB) 1 25 MG/3ML nebulizer solution Take 1 ampule by nebulization every 6 (six) hours as needed for wheezing   Yes Historical Provider, MD   albuterol (PROVENTIL HFA,VENTOLIN HFA) 90 mcg/act inhaler Inhale 2 puffs every 6 (six) hours as needed for wheezing   Yes Historical Provider, MD   AMITIZA 24 MCG capsule Take 24 mcg by mouth 2 (two) times a day with meals 2/1/19  Yes Historical Provider, MD   aspirin 81 MG tablet Take 81 mg by mouth daily   Yes Historical Provider, MD   atorvastatin (LIPITOR) 40 mg tablet Take 40 mg by mouth daily     Yes Historical Provider, MD   baclofen 10 mg tablet Take 10 mg by mouth 3 (three) times a day   Yes Historical Provider, MD   Cholecalciferol (VITAMIN D3) 5000 units CAPS Take 1 capsule (5,000 Units total) by mouth daily 10/9/18  Yes Joe Zimmer PA-C   folic acid (FOLVITE) 1 mg tablet Take 1,000 mcg by mouth daily 1/25/19  Yes Historical Provider, MD   gabapentin (NEURONTIN) 800 mg tablet Take 1 tablet (800 mg total) by mouth 2 (two) times a day 5/9/19  Yes Kameron Alvarez MD   hydrocortisone 1 % lotion Apply 1 application topically as needed  1/4/19 1/4/20 Yes Historical Provider, MD   hydrOXYzine HCL (ATARAX) 50 mg tablet Take 50 mg by mouth 2 (two) times a day  1/14/19  Yes Historical Provider, MD   insulin degludec (TRESIBA FLEXTOUCH) 100 units/mL injection pen Inject 35 Units under the skin daily Taking 35 units every AM as directed   Yes Historical Provider, MD   Methylnaltrexone Bromide (RELISTOR) 150 MG TABS Take 450 mg by mouth as needed  2/20/19  Yes Historical Provider, MD   morphine (MS CONTIN) 60 mg 12 hr tablet Take 1 tablet (60 mg total) by mouth every 12 (twelve) hoursMax Daily Amount: 120 mg 5/9/19  Yes Casie Singleton MD   Morphine Sulfate Microinfusion (MORPHINE SULF MICROINFUSION PF IJ) Inject 14 mg as directed daily Via infusion pump   Yes Historical Provider, MD   Multiple Vitamin (MULTIVITAMIN) tablet Take 1 tablet by mouth daily    Yes Historical Provider, MD   nitroglycerin (NITROSTAT) 0 4 mg SL tablet Place 0 4 mg under the tongue every 5 (five) minutes as needed for chest pain (pt has not  used recently)  Yes Historical Provider, MD   omeprazole (PriLOSEC) 20 mg delayed release capsule Take 1 capsule (20 mg total) by mouth daily  Patient taking differently: Take 40 mg by mouth daily  8/2/18  Yes Arelis Long PA-C   oxyCODONE (ROXICODONE) 30 MG immediate release tablet Take 1 tablet (30 mg total) by mouth every 6 (six) hours as needed for moderate painMax Daily Amount: 120 mg 5/9/19  Yes Casie Singleton MD   POTASSIUM PO Take 40 mEq by mouth 2 (two) times a day   Yes Historical Provider, MD   prochlorperazine (COMPAZINE) 10 mg tablet TAKE 1 TABLET (10 MG TOTAL) BY MOUTH EVERY 6 (SIX) HOURS AS NEEDED FOR NAUSEA OR VOMITING  1/4/19  Yes Historical Provider, MD   sertraline (ZOLOFT) 25 mg tablet Take 25 mg by mouth daily   Yes Historical Provider, MD   tamsulosin (FLOMAX) 0 4 mg Take 0 4 mg by mouth daily with dinner     Yes Historical Provider, MD   ULTICARE SHORT PEN NEEDLES 31G X 8 MM MISC 4 INJECTIONS PER DAY DX  E11 8 8/28/18  Yes Historical Provider, MD   zolpidem (AMBIEN) 10 mg tablet Take 10 mg by mouth daily at bedtime as needed for sleep   Yes Historical Provider, MD   CALCIUM PO Take 1 tablet by mouth daily    Historical Provider, MD   Cyanocobalamin (VITAMIN B-12 PO) Take 1 tablet by mouth daily    Historical Provider, MD   ergocalciferol (VITAMIN D2) 50,000 units Take 50,000 Units by mouth once a week 1/25/19   Historical Provider, MD   Linaclotide 290 MCG CAPS Take 290 mcg by mouth as needed     Historical Provider, MD   polyethylene glycol (GLYCOLAX) powder MIX 1 HEAPING TABLESPOON (17 G) WITH 8 OUNCES OF WATER  PREPARE AND DRINK SOLUTION ONCE DAILY   12/21/18   Historical Provider, MD   traZODone (DESYREL) 100 mg tablet Take 1 tablet (100 mg total) by mouth daily at bedtime 5/9/19   Barry Morejon MD       Family History:     Family History   Problem Relation Age of Onset    Diabetes unspecified Mother     Diabetes unspecified Brother     Diabetes unspecified Paternal Uncle     Diabetes unspecified Maternal Grandmother     Diabetes unspecified Paternal Grandmother     Diabetes unspecified Brother         Social History:       Social History     Socioeconomic History    Marital status: /Civil Union     Spouse name: None    Number of children: None    Years of education: None    Highest education level: None   Occupational History    None   Social Needs    Financial resource strain: None    Food insecurity:     Worry: None     Inability: None    Transportation needs:     Medical: None     Non-medical: None   Tobacco Use    Smoking status: Never Smoker    Smokeless tobacco: Never Used   Substance and Sexual Activity    Alcohol use: Yes     Frequency: Monthly or less     Drinks per session: 1 or 2     Binge frequency: Never     Comment: 2 times per year    Drug use: No    Sexual activity: None   Lifestyle    Physical activity:     Days per week: None     Minutes per session: None    Stress: None   Relationships    Social connections:     Talks on phone: None     Gets together: None     Attends Gnosticism service: None     Active member of club or organization: None     Attends meetings of clubs or organizations: None     Relationship status: None    Intimate partner violence:     Fear of current or ex partner: None     Emotionally abused: None     Physically abused: None     Forced sexual activity: None   Other Topics Concern    None   Social History Narrative    None       ROS:  Symptoms per HPI  Remainder review of systems is negative    Exam:  General:  alert, oriented and in no distress, cooperative  Head: Normocephalic, atraumatic  Eyes:  EOMI  Pupils - equal, round, reactive to accomodation  No icterus  Normal Conjunctiva  Oropharynx: moist and normal-appearing mucosa  Neck: supple, symmetrical, trachea midline and no JVD  Heart:  Distant heart sounds, rate and rhythm regular  No murmurs  Respiratory effort / Chest Inspection: unlabored  Lungs:  Lungs clear bilaterally  Abdomen: flat, normal findings: bowel sounds normal and soft, non-tender  Lower Limbs:  Edema is a bilateral feet  Ecchymosis noted to left 2nd toe  Pulses[de-identified]  RLE - DP: present                  LLE - DP: present   Musculoskeletal: ROM grossly normal        DATA:      ECG:     Sinus rhythm  First degree AV block  Right bundle branch block  Left posterior fascicular block  Trifascicular block  Abnormal ECG                  Telemetry: Normal sinus rhythm,   HR 55-80          Echocardiogram:  Repeat pending        06/2017:  SUMMARY     LEFT VENTRICLE:  The ventricle was mildly dilated  Systolic function was normal  Ejection fraction was estimated to be 60 %  There were no regional wall motion abnormalities  Wall thickness was moderately increased      LEFT ATRIUM:  The atrium was mildly dilated      MITRAL VALVE:  There was mild regurgitation      AORTIC VALVE:  There was mild regurgitation      TRICUSPID VALVE:  There was mild regurgitation      HISTORY: PRIOR HISTORY: CHF, diabetes, hyperlipidemia, hypothyroid     PROCEDURE: The study was performed in the [address] [place] [address]   This was a routine study  The transthoracic approach was used  The study included complete 2D imaging, M-mode, complete spectral Doppler, and color Doppler  Image  quality was adequate      LEFT VENTRICLE: The ventricle was mildly dilated  Systolic function was normal  Ejection fraction was estimated to be 60 %  There were no regional wall motion abnormalities  Wall thickness was moderately increased  DOPPLER: The ratio of  early ventricular filling to atrial contraction velocities was within the normal range  The deceleration time of the early transmitral flow velocity was increased      RIGHT VENTRICLE: The size was normal  Systolic function was normal  Wall thickness was normal      LEFT ATRIUM: The atrium was mildly dilated      RIGHT ATRIUM: Size was at the upper limits of normal      MITRAL VALVE: Valve structure was normal  There was normal leaflet separation  DOPPLER: The transmitral velocity was within the normal range  There was no evidence for stenosis  There was mild regurgitation      AORTIC VALVE: The valve was trileaflet  Leaflets exhibited mildly to moderately increased thickness  DOPPLER: There was no evidence for stenosis  There was mild regurgitation      TRICUSPID VALVE: The valve structure was normal  There was normal leaflet separation  DOPPLER: The transtricuspid velocity was within the normal range  There was no evidence for stenosis  There was mild regurgitation  The tricuspid jet  envelope definition was inadequate for estimation of RV systolic pressure  There are no indirect findings (abnormal RV volume or geometry, altered pulmonary flow velocity profile, or leftward septal displacement) which would suggest  moderate or severe pulmonary hypertension      PULMONIC VALVE: Leaflets exhibited normal thickness, no calcification, and normal cuspal separation  DOPPLER: The transpulmonic velocity was within the normal range  There was no regurgitation      PERICARDIUM: There was no pericardial effusion   The pericardium was normal in appearance      AORTA: The root exhibited upper limit of normal size      SYSTEMIC VEINS: IVC: The inferior vena cava was not well visualized  Ischemic Testing:  Cardiac catheterization 11/2017:  CORONARY VESSELS:   --  The coronary circulation is right dominant  --  Left main: Angiography showed minor luminal irregularities  --  Proximal LAD: Angiography showed minor luminal irregularities  --  Mid LAD: Angiography showed minor luminal irregularities  --  Distal LAD: The vessel was small sized  Angiography showed diffuse disease  There was a 95 % stenosis  --  Proximal circumflex: There was a 0 % stenosis at the site of a prior stent  --  Mid RCA: There was a 85 % stenosis      IMPRESSIONS:  pci rca with shira     RECOMMENDATIONS  weight loss, statin, dapt x 1 year      Prepared and signed by         Weights: Wt Readings from Last 3 Encounters:   08/08/19 108 kg (239 lb)   06/25/19 111 kg (244 lb 6 4 oz)   06/12/19 117 kg (259 lb)   , Body mass index is 32 41 kg/m²           Lab Studies:             Results from last 7 days   Lab Units 08/09/19  0529 08/08/19  1626   WBC Thousand/uL 8 47 11 23*   HEMOGLOBIN g/dL 11 4* 12 6   HEMATOCRIT % 37 2 41 4   PLATELETS Thousands/uL 155 214   ,   Results from last 7 days   Lab Units 08/09/19  0529 08/08/19  1626   POTASSIUM mmol/L 4 3 4 8   CHLORIDE mmol/L 110* 108   CO2 mmol/L 24 28   BUN mg/dL 7 8   CREATININE mg/dL 0 84 1 05   CALCIUM mg/dL 8 4 8 8   ALK PHOS U/L  --  107   ALT U/L  --  17   AST U/L  --  13

## 2019-08-09 NOTE — OCCUPATIONAL THERAPY NOTE
[address] Evaluation     Patient Name: Robi Castaneda  Today's Date: 8/9/2019  Problem List  Patient Active Problem List   Diagnosis    Essential hypertension    Type 2 diabetes mellitus with renal complication ([facility]    Chronic diastolic congestive heart failure ([facility]    Coronary artery disease involving native coronary artery    Morbid obesity ([facility]    CVA (cerebral vascular accident) ([facility]    CHF (congestive heart failure) ([facility]    Stroke ([facility]    Stented coronary artery    Obstructive sleep apnea syndrome    MI (myocardial infarction) ([facility]    Depression    CPAP (continuous positive airway pressure) dependence    Brain aneurysm    Hypokalemia    Alzheimer disease    Acute on chronic diastolic congestive heart failure (HCC)    Chronic pain disorder    Constipation    Coronary artery disease    Type 2 diabetes mellitus with hyperglycemia, with long-term current use of insulin ([facility]    Sleep apnea    Acute tracheobronchitis    Dyspnea on exertion    Bilateral leg edema    Chronic respiratory failure ([facility]    Chest pain    Stage 3 chronic kidney disease (HCC)    Leukocytosis    GERD (gastroesophageal reflux disease)    Acute renal failure superimposed on stage 3 chronic kidney disease (HCC)    Opioid dependence ([facility])    Esophageal dysphagia    Chronic migraine without aura without status migrainosus, not intractable    Hyperlipidemia    Vitamin D deficiency    Dysphagia    Fall at home    Closed nondisplaced fracture of body of left calcaneus    Pain and swelling of left knee    Pressure injury of skin of sacral region    Symptomatic bradycardia     Past Medical History  Past Medical History:   Diagnosis Date    Acute on chronic diastolic congestive heart failure (HCC)     Altered gait     Alzheimer disease     per patients,,early onset     Angina pectoris ([facility]     Anxiety     Arthritis     Brain aneurysm     coils placed    Cardiac disease  Chest pain 1/13/2016    Chronic pain     back/ right groin and rle- has morphine pump    Constipation     COPD (chronic obstructive pulmonary disease) (HCC)     Coronary artery disease     CPAP (continuous positive airway pressure) dependence     Decubital ulcer     sacral decub-occured 5/2019-sees wound care/debide in OR today 6/6/2019    Dependent on walker for ambulation     w/c for long distance    Depression     Diabetes mellitus (HCC)     insulin dependent    Dizziness     occ    Dysphagia     Enlarged prostate     Fall     GERD (gastroesophageal reflux disease)     Heart failure ([facility]     Hiatal hernia     Hx of gastric bypass 11/19/2018    Hypercholesterolemia     Hypertension     MI (myocardial infarction) ([facility]     2017- stents x2    Migraine     Morbid obesity ([facility]     gastric bypass sleeve 11/2018-wt loss 125 lb    Neuropathy     Oxygen dependent     Q HS  2LPM with CPAP and prn during day 2-3 LPM     Renal disorder     Shortness of breath     Skin abnormality     sacral wound - covered with pad    Sleep apnea     Stented coronary artery     Stroke ([facility]     vision loss b/l  2005, residual R leg weakness    Urinary frequency     Use of cane as ambulatory aid     Wears dentures     Wears glasses     Wears glasses     Wheelchair dependent      Past Surgical History  Past Surgical History:   Procedure Laterality Date    BACK SURGERY      BRAIN SURGERY      CARDIAC CATHETERIZATION      with stents    CEREBRAL ANEURYSM REPAIR      with coils    COLONOSCOPY      ESOPHAGOGASTRODUODENOSCOPY N/A 7/1/2016    Procedure: ESOPHAGOGASTRODUODENOSCOPY (EGD); Surgeon: Palbito Powers MD;  [facility];   Service:     GASTRIC BYPASS  11/19/2018    HERNIA REPAIR      HIATAL HERNIA REPAIR      INFUSION PUMP IMPLANTATION Left     morphine    KNEE ARTHROSCOPY Right     KNEE ARTHROSCOPY Right     PERONEAL NERVE DECOMPRESSION Right     DC DEBRIDEMENT OPEN WOUND 20 SQ CM< N/A 6/6/2019    Procedure: EXCISIONAL DEBRIDEMENT OF SACRAL DECUBITUS ULCER;  Surgeon: Delia Dan MD;  [facility];  Service: General    NE ESOPHAGOGASTRODUODENOSCOPY TRANSORAL DIAGNOSTIC N/A 2/27/2017    Procedure: ESOPHAGOGASTRODUODENOSCOPY (EGD); Surgeon: Sven Patrick MD;  [facility]; Service: Gastroenterology    NE ESOPHAGOGASTRODUODENOSCOPY TRANSORAL DIAGNOSTIC N/A 8/23/2018    Procedure: ESOPHAGOGASTRODUODENOSCOPY (EGD) with biopsy;  Surgeon: Sven Patrick MD;  [facility]; Service: Gastroenterology    NE INSERT SPINE INFUSN [facility] N/A 1/19/2017    Procedure: REMOVAL / EXCHANGE INTRATHECAL PAIN PUMP;  Surgeon: Abbey Carroll MD;  [facility];  Service: Orthopedics    NE INSERT SPINE INFUSN [facility] N/A 5/16/2016    Procedure: REPLACEMENT AND PROGRAM PUMP ;  Surgeon: Abbey Carroll MD;  [facility];  Service: Orthopedics           08/09/19 1210   Note Type   Note type Eval/Treat   Restrictions/Precautions   Braces or Orthoses   (L knee hinged brace for comfort only per pt)   Other Precautions Fall Risk;Pain;Multiple lines;Telemetry; Bed Alarm   Pain Assessment   Pain Assessment 0-10   Pain Score Worst Possible Pain  (12)   Pain Location Back;Leg;Knee   Pain Orientation Bilateral   Hospital Pain Intervention(s) Ambulation/increased activity;Repositioned; Emotional support;Cold applied   Response to Interventions tolerated   Home Living   Type of [facility] Multi-level; Able to live on main level with bedroom/bathroom; Performs ADLs on one level;Stairs to enter with rails  (5 +1 QUE)   Bathroom Shower/Tub Tub/shower unit  (currently sponge bathing)   [address] [place] [address] Dr oshea   Bathroom Accessibility Accessible   Home Equipment Walker;Cane  (rollator)   Additional Comments pt reports had recent falls, sons able to help him at home, one son is 21 who has autism and is  home w/ him all day   Prior Function   Level of [place] Independent with ADLs and functional mobility; Needs assistance with IADLs; Needs assistance with ADLs and functional mobility  (reports recent assist from wife for LB ADLs)   Lives With Spouse;Son  (3 sons)   [address] Help From Family;Home health   ADL Assistance Needs assistance   IADLs Needs assistance   Falls in the last 6 months 5 to 10  (reports atleast 9 falls)   Vocational On disability   Comments pt reports becoming dizzy and having falls at home; reports feeling weak and generalized pain t/o    Lifestyle   Autonomy per pt independent w/ UB ADLs, assist LB ADLs, assist IADLs, independent functional transfers and mobility w/ SPC in home and rollator in community   Reciprocal Relationships spouse and 3 sons   Service to Others on disability   Intrinsic Gratification watching tv   ADL   Where Assessed Chair   Eating Assistance 5  Supervision/Setup   Grooming Assistance 4  Minimal Assistance   UB Bathing Assistance 4  Minimal Assistance   LB [address] 3  Moderate Assistance   UB Dressing Assistance 4  [address] 3  Moderate [address]  3  Moderate Assistance   Functional Assistance 3  Moderate Assistance   Bed Mobility   Supine to Sit 4  Minimal assistance   Additional items Assist x 1; Increased time required;Verbal cues;LE management; Bedrails;HOB elevated   Sit to Supine 4  Minimal assistance   Additional items Assist x 1; Increased time required;Verbal cues;LE management; Bedrails   Additional Comments log rolling techniques   Transfers   Sit to Stand 4  Minimal assistance   Additional items Assist x 1; Increased time required;Verbal cues; Bedrails   Stand to Sit 4  Minimal assistance   Additional items Assist x 1; Increased time required;Verbal cues   Additional Comments VCs for hand placement and positioning   Functional Mobility   Functional Mobility 4  Minimal assistance   Additional Comments assist x1 w/ increased time to complete   Additional items Rolling walker   Balance   Static Sitting Fair +   Dynamic Sitting Fair   Static Standing Fair -   Dynamic Standing Poor +   Activity Tolerance   Activity Tolerance Patient limited by fatigue;Treatment limited secondary to medical complications (Comment); Patient limited by pain  (dizziness)   Medical Staff Moni Mendez made aware of dizziness and pain   Nurse Made Aware appropriate to see per Erin HART Assessment   RUE Assessment WFL  (grossly 3+/5)   LUE Assessment   LUE Assessment WFL  (grossly 3+/5)   Hand Function   Gross Motor Coordination Functional   Sensation   Light Touch Partial deficits in the RLE;Partial deficits in the LLE   Vision-Basic Assessment   Current Vision No visual deficits   Vision - Complex Assessment   Ocular Range of Motion WFL   Acuity Able to read clock/calendar on wall without difficulty   Perception   Inattention/Neglect Appears intact   Cognition   Overall Cognitive Status WFL   Arousal/Participation Responsive; Cooperative   Attention Attends with cues to redirect   Orientation Level Oriented to person;Oriented to place;Oriented to situation;Oriented to time   Memory Decreased recall of precautions   Following Commands Follows one step commands with increased time or repetition   Comments pt lethargic, pt w/ pain limiting factor, increased processing time   Assessment   Limitation Decreased ADL status; Decreased Safe judgement during ADL;Decreased UE strength;Decreased cognition;Decreased endurance;Decreased self-care trans;Decreased high-level ADLs; Decreased sensation   Prognosis Good;Fair   Assessment Pt is a 66 y o  male seen for OT evaluation s/p admit to [facility] on 8/8/2019 w/ Symptomatic bradycardia, dizziness and falls  Ct spine cervical (-) acute, XRay foot (-)  Comorbidities affecting pt's functional performance at time of assessment include:  CHF, CKD III, CAD, chronic pain disorder, DM II    Personal factors affecting pt at time of IE include:dizziness, reports over 9 falls  Prior to admission, pt was living w/ spouse and sons and reports independent w/ ADLs (up until recently assist from wife), independent w/ functional transfers and mobility w/ rollator in community and [facility] in home, assist IADLs  Upon evaluation: Pt requires MIN assist supine<>sit bed mobility w/ log rolling techniques, MIN assist sit<>stand w/VCs for safety, MOD assist LB ADLs, MIN assist UB ADLs, MIN-MOD assist toileting 2* the following deficits impacting occupational performance: impaired skin integrity w/ wound on bottom, increased pain in LEs and back, impaired balance, impaired functional reach, impaired activity tolerance, dizziness (BP supine: 100/48, EOB: 93/59, standing and returns to seated positioning 97/62), increased processing time, lethargy  Pt to benefit from continued skilled OT tx while in the hospital to address deficits as defined above and maximize level of functional independence w ADL's and functional mobility  Occupational Performance areas to address include: grooming, bathing/shower, toilet hygiene, dressing, health maintenance, functional mobility and clothing management  From OT standpoint, recommendation at time of d/c would be short term rehab  Pt w/ bed alarm intact end of session  Goals   Patient Goals "to go to rehab to get stronger"   LTG Time Frame 10-14   Long Term Goal please see below goals   Plan   Treatment Interventions ADL retraining;Cognitive reorientation; Endurance training;UE strengthening/ROM; Functional transfer training;Patient/family training;Equipment evaluation/education; Compensatory technique education; Energy conservation; Activityengagement   Goal Expiration Date 08/23/19   OT Frequency 3-5x/wk   Recommendation   OT Discharge Recommendation Short Term Rehab   OT - OK to Discharge   (to rehab when medically stable)   Barthel Index   Feeding 10   Bathing 0   Grooming Score 0   Dressing Score 5   Bladder Score 10 Bowels Score 10   Toilet Use Score 5   Transfers (Bed/Chair) Score 10   Mobility (Level Surface) Score 0   Stairs Score 0   Barthel Index Score 50   Modified Consuelo Scale   Modified [place] Scale 4     Occupational Therapy Goals to be met in 10-14 days:  1) Pt will improve activity tolerance to G for min 30 min txment sessions to enhance ADLs  2) Pt will complete ADLs/self care w/ supervision   3) Pt will complete toileting w/ supervision w/ G hygiene/thoroughness using DME PRN  4) Pt will improve functional transfers on/off all surfaces using DME PRN w/ G balance/safety including toileting w/ supervision  5) Pt will improve fx'l mobility during I/ADl/leisure tasks using DME PRN w/ g balance/safety w/ supervision  6) Pt will engage in ongoing cognitive assessment w/ G participation to A w/ safe d/c planning/recommendations  7) Pt will demonstrate G carryover of pt/caregiver education and training as appropriate w/ mod I  w/ G tolerance  8) Pt will engage in depression screen/leisure interest checklist w/ G participation to monitor s/s depression and ID 3 positive coping strategies to A w/ emotional regulation and management  9) Pt will demonstrate 100% carryover of E C  techniques w/ mod I t/o fx'l I/ADL/leisure tasks w/o cues s/p skilled education  10) Pt will demonstrate improved bed mobility to supervision w/ log rolling techniques  11) Pt will demonstrate improved standing tolerance to 3-5 minutes during functional tasks w/ no LOB to enhance ADL performance  12) Pt will demonstrate improved b/l UE strength by 1 MMT grade to enhance ADLS and functional transfers  13) Pt will recall 3 fall safety prevention techniques to enhance safety within the home      Documentation completed by:   Martha Flores MS, OTR/L

## 2019-08-09 NOTE — ED PROVIDER NOTES
History  Chief Complaint   Patient presents with    Fall     Pt referred by VNA, states fell yesterday and today at 1300, "I got dizzy and just went down", denies head strike, no thinners, referred for wound to L index and middle toe, edema and ecchymosis noted, reports history of falls r/t hypotenstion and hypokalemia  HPI this is a 81-year-old male who presents status post multiple falls  The patient states that he had 2 distinct episodes, 1 of which was preceded by lightheadedness, followed by what appears to be a fall without loss of conscious, and without head strike  This occurred yesterday  Earlier today, the patient had a fall, which occurred out of the blue, however the patient describes as mechanical   Again he did not strike his head  He did not lose consciousness during event  The patient does have a history of chronic pain, for which he takes several narcotics, sedating medications, and is also on a morphine pump  It appears as if his chronic pain is based on chart review back pain  Patient was also evaluated by his VNA earlier today, for decubitus ulcers, which were determined to be stable, however his VNA was concerned about his falls, so told to come the emergency department for further evaluation  The patient continues to have lightheadedness currently, and describes multiple complaints, mostly related to pain, all of which the patient endorses are chronic  He denies any fevers, chills, palpitations, shortness of breath, chest pain, abdominal pain, nausea, vomiting, diarrhea  Prior to Admission Medications   Prescriptions Last Dose Informant Patient Reported? Taking?    ADVAIR DISKUS 500-50 MCG/DOSE inhaler  Self Yes Yes   Sig: Inhale 1 puff 2 (two) times a day   AMITIZA 24 MCG capsule  Self Yes Yes   Sig: Take 24 mcg by mouth 2 (two) times a day with meals   CALCIUM PO Not Taking at Unknown time Self Yes No   Sig: Take 1 tablet by mouth daily   Cholecalciferol (VITAMIN D3) 5000 units CAPS  Self No Yes   Sig: Take 1 capsule (5,000 Units total) by mouth daily   Cyanocobalamin (VITAMIN B-12 PO) Not Taking at Unknown time Self Yes No   Sig: Take 1 tablet by mouth daily   Linaclotide 290 MCG CAPS Not Taking at Unknown time Self Yes No   Sig: Take 290 mcg by mouth as needed    Methylnaltrexone Bromide (RELISTOR) 150 MG TABS  Self Yes Yes   Sig: Take 450 mg by mouth as needed    Morphine Sulfate Microinfusion (MORPHINE SULF MICROINFUSION PF IJ)  Self Yes Yes   Sig: Inject 14 mg as directed daily Via infusion pump   Multiple Vitamin (MULTIVITAMIN) tablet  Self Yes Yes   Sig: Take 1 tablet by mouth daily    POTASSIUM PO  Self Yes Yes   Sig: Take 40 mEq by mouth 2 (two) times a day   ULTICARE SHORT PEN NEEDLES 31G X 8 MM MISC  Self Yes Yes   Si INJECTIONS PER DAY DX  E11 8   albuterol (ACCUNEB) 1 25 MG/3ML nebulizer solution  Self Yes Yes   Sig: Take 1 ampule by nebulization every 6 (six) hours as needed for wheezing   albuterol (PROVENTIL HFA,VENTOLIN HFA) 90 mcg/act inhaler  Self Yes Yes   Sig: Inhale 2 puffs every 6 (six) hours as needed for wheezing   aspirin 81 MG tablet  Self Yes Yes   Sig: Take 81 mg by mouth daily   atorvastatin (LIPITOR) 40 mg tablet  Self Yes Yes   Sig: Take 40 mg by mouth daily     baclofen 10 mg tablet  Self Yes Yes   Sig: Take 10 mg by mouth 3 (three) times a day   ergocalciferol (VITAMIN D2) 50,000 units Not Taking at Unknown time Self Yes No   Sig: Take 50,000 Units by mouth once a week   folic acid (FOLVITE) 1 mg tablet  Self Yes Yes   Sig: Take 1,000 mcg by mouth daily   gabapentin (NEURONTIN) 800 mg tablet  Self No Yes   Sig: Take 1 tablet (800 mg total) by mouth 2 (two) times a day   hydrOXYzine HCL (ATARAX) 50 mg tablet  Self Yes Yes   Sig: Take 50 mg by mouth 2 (two) times a day    hydrocortisone 1 % lotion  Self Yes Yes   Sig: Apply 1 application topically as needed    insulin degludec (TRESIBA FLEXTOUCH) 100 units/mL injection pen  Self Yes Yes   Sig: Inject 35 Units under the skin daily Taking 35 units every AM as directed   morphine (MS CONTIN) 60 mg 12 hr tablet  Self No Yes   Sig: Take 1 tablet (60 mg total) by mouth every 12 (twelve) hoursMax Daily Amount: 120 mg   nitroglycerin (NITROSTAT) 0 4 mg SL tablet  Self Yes Yes   Sig: Place 0 4 mg under the tongue every 5 (five) minutes as needed for chest pain (pt has not  used recently)  omeprazole (PriLOSEC) 20 mg delayed release capsule  Self No Yes   Sig: Take 1 capsule (20 mg total) by mouth daily   Patient taking differently: Take 40 mg by mouth daily    oxyCODONE (ROXICODONE) 30 MG immediate release tablet  Self No Yes   Sig: Take 1 tablet (30 mg total) by mouth every 6 (six) hours as needed for moderate painMax Daily Amount: 120 mg   polyethylene glycol (GLYCOLAX) powder Not Taking at Unknown time Self Yes No   Sig: MIX 1 HEAPING TABLESPOON (17 G) WITH 8 OUNCES OF WATER  PREPARE AND DRINK SOLUTION ONCE DAILY  prochlorperazine (COMPAZINE) 10 mg tablet  Self Yes Yes   Sig: TAKE 1 TABLET (10 MG TOTAL) BY MOUTH EVERY 6 (SIX) HOURS AS NEEDED FOR NAUSEA OR VOMITING  sertraline (ZOLOFT) 25 mg tablet   Yes Yes   Sig: Take 25 mg by mouth daily   tamsulosin (FLOMAX) 0 4 mg  Self Yes Yes   Sig: Take 0 4 mg by mouth daily with dinner     traZODone (DESYREL) 100 mg tablet Unknown at Unknown time Self No No   Sig: Take 1 tablet (100 mg total) by mouth daily at bedtime   zolpidem (AMBIEN) 10 mg tablet  Self Yes Yes   Sig: Take 10 mg by mouth daily at bedtime as needed for sleep      Facility-Administered Medications: None       Past Medical History:   Diagnosis Date    Acute on chronic diastolic congestive heart failure (HCC)     Altered gait     Alzheimer disease     per patients,,early onset     Angina pectoris ([address] )     Anxiety     Arthritis     Brain aneurysm     coils placed    Cardiac disease     Chest pain 1/13/2016    Chronic pain     back/ right groin and rle- has morphine pump    Constipation     COPD (chronic obstructive pulmonary disease) (HCC)     Coronary artery disease     CPAP (continuous positive airway pressure) dependence     Decubital ulcer     sacral decub-occured 5/2019-sees wound care/debide in OR today 6/6/2019    Dependent on walker for ambulation     w/c for long distance    Depression     Diabetes mellitus (HCC)     insulin dependent    Dizziness     occ    Dysphagia     Enlarged prostate     Fall     GERD (gastroesophageal reflux disease)     [facility] failure [facility]     Hiatal hernia     Hx of gastric bypass 11/19/2018    Hypercholesterolemia     Hypertension     [facility]     2017- stents x2    Migraine     Morbid obesity ([facility]     gastric bypass sleeve 11/2018-wt loss 125 lb    Neuropathy     Oxygen dependent     Q HS  2LPM with CPAP and prn during day 2-3 LPM     Renal disorder     Shortness of breath     Skin abnormality     sacral wound - covered with pad    Sleep apnea     Stented coronary artery     Stroke ([facility]     vision loss b/l  2005, residual R leg weakness    Urinary frequency     Use of cane as ambulatory aid     Wears dentures     Wears glasses     Wears glasses     Wheelchair dependent        Past Surgical History:   Procedure Laterality Date    BACK SURGERY      BRAIN SURGERY      CARDIAC CATHETERIZATION      with stents    CEREBRAL ANEURYSM REPAIR      with coils    COLONOSCOPY      ESOPHAGOGASTRODUODENOSCOPY N/A 7/1/2016    Procedure: ESOPHAGOGASTRODUODENOSCOPY (EGD); Surgeon: Shaan Nick MD;  [facility];   Service:     GASTRIC BYPASS  11/19/2018    HERNIA REPAIR      HIATAL HERNIA REPAIR      INFUSION PUMP IMPLANTATION Left     morphine    KNEE ARTHROSCOPY Right     KNEE ARTHROSCOPY Right     PERONEAL NERVE DECOMPRESSION Right     VT DEBRIDEMENT OPEN WOUND 20 SQ CM< N/A 6/6/2019    Procedure: EXCISIONAL DEBRIDEMENT OF SACRAL DECUBITUS ULCER;  Surgeon: Fara Hinton MD;  Location: [facility] OR; Service: General    IL ESOPHAGOGASTRODUODENOSCOPY TRANSORAL DIAGNOSTIC N/A 2/27/2017    Procedure: ESOPHAGOGASTRODUODENOSCOPY (EGD); Surgeon: Mireya Plummer MD;  [facility]: [facility]; Service: Gastroenterology    IL ESOPHAGOGASTRODUODENOSCOPY TRANSORAL DIAGNOSTIC N/A 8/23/2018    Procedure: ESOPHAGOGASTRODUODENOSCOPY (EGD) with biopsy;  Surgeon: Mireya Plummer MD;  [facility]; Service: Gastroenterology    IL INSERT SPINE INFUSN [facility] N/A 1/19/2017    Procedure: REMOVAL / Michel Doctor;  Surgeon: Martin Wahl MD;  [facility];  Service: Orthopedics    IL INSERT SPINE INFUSN [address] [facility] N/A 5/16/2016    Procedure: REPLACEMENT AND PROGRAM PUMP ;  Surgeon: Martin Wahl MD;  [facility];  Service: Orthopedics       Family History   Problem Relation Age of Onset    Diabetes unspecified Mother     Diabetes unspecified Brother     Diabetes unspecified Paternal Uncle     Diabetes unspecified Maternal Grandmother     Diabetes unspecified Paternal Grandmother     Diabetes unspecified Brother      I have reviewed and agree with the history as documented  Social History     Tobacco Use    Smoking status: Never Smoker    Smokeless tobacco: Never Used   Substance Use Topics    Alcohol use: Yes     Frequency: Monthly or less     Drinks per session: 1 or 2     Binge frequency: Never     Comment: 2 times per year    Drug use: No        Review of Systems   Constitutional: Negative for chills and fever  HENT: Negative for congestion and sinus pain  Eyes: Negative for photophobia and visual disturbance  Respiratory: Negative for cough and shortness of breath  Cardiovascular: Negative for chest pain and palpitations  Gastrointestinal: Negative for abdominal pain, diarrhea, nausea and vomiting  Genitourinary: Negative for dysuria and hematuria  Musculoskeletal: Negative for neck pain and neck stiffness  Skin: Negative for pallor and rash  Neurological: Positive for light-headedness  Negative for headaches  Physical Exam  ED Triage Vitals [08/08/19 1544]   Temperature Pulse Respirations Blood Pressure SpO2   97 8 °F (36 6 °C) 61 16 120/72 97 %      Temp Source Heart Rate Source Patient Position - Orthostatic VS BP Location FiO2 (%)   Oral Monitor Sitting Right arm --      Pain Score       9             Orthostatic Vital Signs  Vitals:    08/08/19 2320 08/09/19 0700 08/09/19 1100 08/09/19 1547   BP: 110/68 124/94 103/58 96/52   Pulse: (!) 52 75 96 67   Patient Position - Orthostatic VS: Lying Lying Sitting Sitting       Physical Exam   Constitutional: He is oriented to person, place, and time  Awake and alert, well appearing, no acute distress  Nontoxic in appearance  Mental status is appropriate  Chronically ill-appearing   HENT:   Head: Normocephalic  Mouth/Throat: Oropharynx is clear and moist  No oropharyngeal exudate  No anterior septal hematoma, no hemotympanum bilaterally   Eyes: Pupils are equal, round, and reactive to light  EOM are normal  No scleral icterus  Neck: Normal range of motion  No JVD present  Cardiovascular: Normal rate, regular rhythm and normal heart sounds  No murmur heard  Pulmonary/Chest: Effort normal  No stridor  No respiratory distress  He has no wheezes  He has no rales  Abdominal: Soft  There is tenderness to palpation the periumbilical abdomen, with voluntary guarding  No rebound, rigidity distention appreciated  Musculoskeletal: Normal range of motion  He exhibits no edema, tenderness or deformity  Several decubitus ulcers in multiple stages on the sacrum, and buttocks bilaterally   Neurological: He is alert and oriented to person, place, and time  No cranial nerve deficit  He exhibits normal muscle tone  Strength is 5/5 in all extremities and equal bilaterally  Sensation grossly intact and equal in extremities  No cranial nerve deficits appreciated   Skin: Skin is warm and dry   No pallor         ED Medications  Medications   fluticasone-vilanterol (BREO ELLIPTA) 200-25 MCG/INH inhaler 1 puff (1 puff Inhalation Given 8/9/19 0839)   albuterol inhalation solution 1 25 mg (has no administration in time range)   albuterol (PROVENTIL HFA,VENTOLIN HFA) inhaler 2 puff (has no administration in time range)   lubiprostone (AMITIZA) capsule 24 mcg (24 mcg Oral Given 8/9/19 1726)   aspirin (ECOTRIN LOW STRENGTH) EC tablet 81 mg (81 mg Oral Given 8/9/19 0837)   atorvastatin (LIPITOR) tablet 40 mg (40 mg Oral Given 8/9/19 1726)   baclofen tablet 10 mg (10 mg Oral Given 8/9/19 1500)   calcium carbonate-vitamin D (OSCAL-D) 500 mg-200 units per tablet 2 tablet (2 tablets Oral Given 8/9/19 0836)   cyanocobalamin (VITAMIN B-12) tablet 500 mcg (500 mcg Oral Given 4/9/53 4566)   folic acid (FOLVITE) tablet 1,000 mcg (1,000 mcg Oral Given 8/9/19 0836)   gabapentin (NEURONTIN) capsule 800 mg (800 mg Oral Given 8/9/19 1726)   insulin glargine (LANTUS) subcutaneous injection 35 Units 0 35 mL (35 Units Subcutaneous Given 8/9/19 0908)   morphine (MS CONTIN) ER tablet 60 mg (60 mg Oral Given 8/9/19 0907)   multivitamin-minerals (CENTRUM) tablet 1 tablet (1 tablet Oral Given 8/9/19 0837)   pantoprazole (PROTONIX) EC tablet 40 mg (40 mg Oral Given 8/9/19 0537)   oxyCODONE (ROXICODONE) immediate release tablet 30 mg (30 mg Oral Given 8/9/19 1901)   polyethylene glycol (MIRALAX) packet 17 g (has no administration in time range)   potassium chloride (K-DUR,KLOR-CON) CR tablet 40 mEq (40 mEq Oral Given 8/9/19 0838)   traZODone (DESYREL) tablet 100 mg (100 mg Oral Given 8/8/19 2122)   zolpidem (AMBIEN) tablet 10 mg (has no administration in time range)   ondansetron (ZOFRAN) injection 4 mg (4 mg Intravenous Given 8/9/19 1231)   aluminum-magnesium hydroxide-simethicone (MYLANTA) 200-200-20 mg/5 mL oral suspension 30 mL (has no administration in time range)   heparin (porcine) subcutaneous injection 5,000 Units (5,000 Units Subcutaneous Given 8/9/19 1459)   insulin lispro (HumaLOG) 100 units/mL subcutaneous injection 1-6 Units (1 Units Subcutaneous Not Given 8/9/19 1722)   insulin lispro (HumaLOG) 100 units/mL subcutaneous injection 1-5 Units (1 Units Subcutaneous Not Given 8/8/19 2125)   acetaminophen (TYLENOL) tablet 650 mg (has no administration in time range)   fludrocortisone (FLORINEF) tablet 0 1 mg (0 1 mg Oral Given 8/9/19 1228)   hydrOXYzine HCL (ATARAX) tablet 25 mg (has no administration in time range)   prochlorperazine (COMPAZINE) tablet 5 mg (has no administration in time range)   sodium chloride 0 9 % bolus 1,000 mL (0 mL Intravenous Stopped 8/8/19 1842)   iohexol (OMNIPAQUE) 350 MG/ML injection (MULTI-DOSE) 100 mL (100 mL Intravenous Given 8/8/19 1728)       Diagnostic Studies  Results Reviewed     Procedure Component Value Units Date/Time    UA w Reflex to Microscopic w Reflex to Culture [[id number]]  (Abnormal) Collected:  08/08/19 2034    Lab Status:  Final result Specimen:  Urine, Other Updated:  08/08/19 2108     Color, UA Yellow     Clarity, UA Clear     Specific Gravity, UA 1 010     pH, UA 5 5     Leukocytes, UA Negative     Nitrite, UA Negative     Protein, UA Negative mg/dl      Glucose, UA Negative mg/dl      Ketones, UA Negative mg/dl      Urobilinogen, UA 4 0 E U /dl      Bilirubin, UA Negative     Blood, UA Negative    B-type natriuretic peptide [[id number]]  (Abnormal) Collected:  08/08/19 1626    Lab Status:  Final result Specimen:  Blood from Arm, Right Updated:  08/08/19 1727     NT-proBNP 316 pg/mL     Lipase [[id number]]  (Abnormal) Collected:  08/08/19 1626    Lab Status:  Final result Specimen:  Blood from Arm, Right Updated:  08/08/19 1700     Lipase 49 u/L     Magnesium [[id number]]  (Normal) Collected:  08/08/19 1626    Lab Status:  Final result Specimen:  Blood from Arm, Right Updated:  08/08/19 1700     Magnesium 2 0 mg/dL     TSH, 3rd generation with Free T4 reflex [[id number]]  (Normal) Collected:  08/08/19 1626    Lab Status:  Final result Specimen:  Blood from Arm, Right Updated:  08/08/19 1700     TSH 3RD GENERATON 1 875 uIU/mL     Narrative:       Patients undergoing fluorescein dye angiography may retain small amounts of fluorescein in the body for 48-72 hours post procedure  Samples containing fluorescein can produce falsely depressed TSH values  If the patient had this procedure,a specimen should be resubmitted post fluorescein clearance        Comprehensive metabolic panel [[id number]]  (Abnormal) Collected:  08/08/19 1626    Lab Status:  Final result Specimen:  Blood from Arm, Right Updated:  08/08/19 1651     Sodium 142 mmol/L      Potassium 4 8 mmol/L      Chloride 108 mmol/L      CO2 28 mmol/L      ANION GAP 6 mmol/L      BUN 8 mg/dL      Creatinine 1 05 mg/dL      Glucose 94 mg/dL      Calcium 8 8 mg/dL      AST 13 U/L      ALT 17 U/L      Alkaline Phosphatase 107 U/L      Total Protein 6 7 g/dL      Albumin 2 8 g/dL      Total Bilirubin 0 40 mg/dL      eGFR 79 ml/min/1 73sq m     Narrative:       Danny guidelines for Chronic Kidney Disease (CKD):     Stage 1 with normal or high GFR (GFR > 90 mL/min/1 73 square meters)    Stage 2 Mild CKD (GFR = 60-89 mL/min/1 73 square meters)    Stage 3A Moderate CKD (GFR = 45-59 mL/min/1 73 square meters)    Stage 3B Moderate CKD (GFR = 30-44 mL/min/1 73 square meters)    Stage 4 Severe CKD (GFR = 15-29 mL/min/1 73 square meters)    Stage 5 End Stage CKD (GFR <15 mL/min/1 73 square meters)  Note: GFR calculation is accurate only with a steady state creatinine    CBC and differential [[id number]]  (Abnormal) Collected:  08/08/19 1626    Lab Status:  Final result Specimen:  Blood from Arm, Right Updated:  08/08/19 1636     WBC 11 23 Thousand/uL      RBC 4 43 Million/uL      Hemoglobin 12 6 g/dL      Hematocrit 41 4 %      MCV 94 fL      MCH 28 4 pg      MCHC 30 4 g/dL      RDW 13 6 %      MPV 10 4 fL      Platelets 140 Thousands/uL      nRBC 0 /100 WBCs      Neutrophils Relative 75 %      Immat GRANS % 0 %      Lymphocytes Relative 14 %      Monocytes Relative 6 %      Eosinophils Relative 5 %      Basophils Relative 0 %      Neutrophils Absolute 8 32 Thousands/µL      Immature Grans Absolute 0 04 Thousand/uL      Lymphocytes Absolute 1 54 Thousands/µL      Monocytes Absolute 0 67 Thousand/µL      Eosinophils Absolute 0 61 Thousand/µL      Basophils Absolute 0 05 Thousands/µL                  CT head without contrast   Final Result by Kristin Coles DO (08/08 1749)   No acute intracranial abnormality  Workstation performed: [id number]         CT cervical spine without contrast   Final Result by Kristin Coles DO (08/08 1753)   No cervical spine fracture or traumatic malalignment  Workstation performed: [id number]         CT abdomen pelvis with contrast   Final Result by Kristin Coles DO (08/08 1808)   No visceral or osseous injury identified  Skin thickening in the region of the umbilicus which may represent inflammation  Significant skin thickening overlying lower sacrum/coccyx and the upper gluteal fold  No obvious abscess on this noncontrast CT  Please clinically evaluate for sacral decubitus  Coronary and aortic atherosclerosis  Fluid-filled esophagus suggesting gastroesophageal reflux  Mild hepatosplenomegaly  Colonic diverticulosis without evidence of diverticulitis  Workstation performed: [id number]         XR foot 3+ vw left   Final Result by Zoe Westfall MD (08/08 1941)      No acute osseous abnormality  Workstation performed: [id number]         XR chest 1 view portable   Final Result by Zoe Westfall MD (08/08 1815)      No acute cardiopulmonary disease              Workstation performed: [id number]               Procedures  ECG 12 Lead Documentation Only  Date/Time: 8/8/2019 7:33 PM  Performed by: David Mcmullen MD  Authorized by: Oumou Palacios MD     ECG reviewed by me, the ED Provider: yes    Patient location:  ED  Previous ECG:     Previous ECG:  Compared to current    Similarity:  No change    Comparison to cardiac monitor: Yes    Interpretation:     Interpretation: abnormal    Comments:      Sinus bradycardia with a heart rate of 52  First degree AV block  Left posterior fascicular block  Right bundle branch block  No ST/T-wave deviations  No changes when compared to prior EKG            ED Course                               MDM  Number of Diagnoses or Management Options  Ambulatory dysfunction:   Bradycardia: This is a 61-year-old male who presents for evaluation of several falls  On arrival, the patient is hemodynamically stable, well-appearing, without acute distress  He is chronically ill-appearing  He displays tenderness in his periumbilical abdomen, which the patient states started yesterday  He has several stable appearing decubitus ulcers which do not appear actively infected  While the patient was in the emergency department, he was monitored on telemetry, and had several episodes of bradycardia to the low 30s  Rhythm appeared to remain sinus, though there are frequent PVCs  -lab work largely unremarkable  -CT head, C-spine negative for traumatic injury  -CT abdomen and pelvis unremarkable for acute injury  -plain films of the left foot, and chest unremarkable  -will admit for ambulatory dysfunction, also possible symptomatic bradycardia    Will admit to telemetry unit    Disposition  Final diagnoses:   Ambulatory dysfunction   Bradycardia     Time reflects when diagnosis was documented in both MDM as applicable and the Disposition within this note     Time User Action Codes Description Comment    8/8/2019  6:24 PM Daniel Hernandez Add [R26 2] Ambulatory dysfunction     8/8/2019  6:24 PM Daniel Hernandez Add [R00 1] Bradycardia     8/8/2019  8:30 PM Marylin Young Add [L89 159] Pressure injury of skin of sacral region ED Disposition     ED Disposition Condition Date/Time Comment    Admit Stable Thu Aug 8, 2019  6:24 PM Case was discussed with SARAH and the patient's admission status was agreed to be Admission Status: inpatient status to the service of Dr Carin Johnson   Follow-up Information    None         Current Discharge Medication List      CONTINUE these medications which have NOT CHANGED    Details   ADVAIR DISKUS 500-50 MCG/DOSE inhaler Inhale 1 puff 2 (two) times a day  Refills: 1      albuterol (ACCUNEB) 1 25 MG/3ML nebulizer solution Take 1 ampule by nebulization every 6 (six) hours as needed for wheezing      albuterol (PROVENTIL HFA,VENTOLIN HFA) 90 mcg/act inhaler Inhale 2 puffs every 6 (six) hours as needed for wheezing      AMITIZA 24 MCG capsule Take 24 mcg by mouth 2 (two) times a day with meals  Refills: 3      aspirin 81 MG tablet Take 81 mg by mouth daily      atorvastatin (LIPITOR) 40 mg tablet Take 40 mg by mouth daily        baclofen 10 mg tablet Take 10 mg by mouth 3 (three) times a day      Cholecalciferol (VITAMIN D3) 5000 units CAPS Take 1 capsule (5,000 Units total) by mouth daily  Qty: 30 capsule, Refills: 6    Associated Diagnoses: Vitamin D deficiency      folic acid (FOLVITE) 1 mg tablet Take 1,000 mcg by mouth daily  Refills: 3      gabapentin (NEURONTIN) 800 mg tablet Take 1 tablet (800 mg total) by mouth 2 (two) times a day  Qty: 6 tablet, Refills: 0    Associated Diagnoses: Chronic pain disorder      hydrocortisone 1 % lotion Apply 1 application topically as needed       hydrOXYzine HCL (ATARAX) 50 mg tablet Take 50 mg by mouth 2 (two) times a day   Refills: 1      insulin degludec (TRESIBA FLEXTOUCH) 100 units/mL injection pen Inject 35 Units under the skin daily Taking 35 units every AM as directed      Methylnaltrexone Bromide (RELISTOR) 150 MG TABS Take 450 mg by mouth as needed       morphine (MS CONTIN) 60 mg 12 hr tablet Take 1 tablet (60 mg total) by mouth every 12 (twelve) hoursMax Daily Amount: 120 mg  Qty: 6 tablet, Refills: 0    Associated Diagnoses: Uncomplicated opioid dependence (HCC)      Morphine Sulfate Microinfusion (MORPHINE SULF MICROINFUSION PF IJ) Inject 14 mg as directed daily Via infusion pump      Multiple Vitamin (MULTIVITAMIN) tablet Take 1 tablet by mouth daily       nitroglycerin (NITROSTAT) 0 4 mg SL tablet Place 0 4 mg under the tongue every 5 (five) minutes as needed for chest pain (pt has not  used recently)  omeprazole (PriLOSEC) 20 mg delayed release capsule Take 1 capsule (20 mg total) by mouth daily  Qty: 30 capsule, Refills: 2    Associated Diagnoses: Dysphagia, unspecified type      oxyCODONE (ROXICODONE) 30 MG immediate release tablet Take 1 tablet (30 mg total) by mouth every 6 (six) hours as needed for moderate painMax Daily Amount: 120 mg  Qty: 12 tablet, Refills: 0    Associated Diagnoses: Chronic pain disorder      POTASSIUM PO Take 40 mEq by mouth 2 (two) times a day      prochlorperazine (COMPAZINE) 10 mg tablet TAKE 1 TABLET (10 MG TOTAL) BY MOUTH EVERY 6 (SIX) HOURS AS NEEDED FOR NAUSEA OR VOMITING  Refills: 0      sertraline (ZOLOFT) 25 mg tablet Take 25 mg by mouth daily      tamsulosin (FLOMAX) 0 4 mg Take 0 4 mg by mouth daily with dinner  ULTICARE SHORT PEN NEEDLES 31G X 8 MM MISC 4 INJECTIONS PER DAY DX  E11 8  Refills: 1      zolpidem (AMBIEN) 10 mg tablet Take 10 mg by mouth daily at bedtime as needed for sleep      CALCIUM PO Take 1 tablet by mouth daily      Cyanocobalamin (VITAMIN B-12 PO) Take 1 tablet by mouth daily      ergocalciferol (VITAMIN D2) 50,000 units Take 50,000 Units by mouth once a week  Refills: 0      Linaclotide 290 MCG CAPS Take 290 mcg by mouth as needed       polyethylene glycol (GLYCOLAX) powder MIX 1 HEAPING TABLESPOON (17 G) WITH 8 OUNCES OF WATER  PREPARE AND DRINK SOLUTION ONCE DAILY    Refills: 5      traZODone (DESYREL) 100 mg tablet Take 1 tablet (100 mg total) by mouth daily at bedtime  Qty: 4 tablet, Refills: 0    Associated Diagnoses: Chronic pain disorder           No discharge procedures on file  ED Provider  Attending physically available and evaluated Amanuel Zacarias I managed the patient along with the ED Attending      Electronically Signed by         Elayne Houston MD  08/09/19 9714

## 2019-08-10 PROBLEM — J20.9 ACUTE TRACHEOBRONCHITIS: Status: RESOLVED | Noted: 2017-05-10 | Resolved: 2019-08-10

## 2019-08-10 PROBLEM — R06.09 DYSPNEA ON EXERTION: Status: RESOLVED | Noted: 2017-09-05 | Resolved: 2019-08-10

## 2019-08-10 PROBLEM — N18.30 ACUTE RENAL FAILURE SUPERIMPOSED ON STAGE 3 CHRONIC KIDNEY DISEASE (HCC): Status: RESOLVED | Noted: 2017-11-06 | Resolved: 2019-08-10

## 2019-08-10 PROBLEM — R07.9 CHEST PAIN: Status: RESOLVED | Noted: 2017-10-12 | Resolved: 2019-08-10

## 2019-08-10 PROBLEM — R13.10 DYSPHAGIA: Status: RESOLVED | Noted: 2018-08-02 | Resolved: 2019-08-10

## 2019-08-10 PROBLEM — D72.829 LEUKOCYTOSIS: Status: RESOLVED | Noted: 2017-10-12 | Resolved: 2019-08-10

## 2019-08-10 PROBLEM — R06.00 DYSPNEA ON EXERTION: Status: RESOLVED | Noted: 2017-09-05 | Resolved: 2019-08-10

## 2019-08-10 PROBLEM — N17.9 ACUTE RENAL FAILURE SUPERIMPOSED ON STAGE 3 CHRONIC KIDNEY DISEASE (HCC): Status: RESOLVED | Noted: 2017-11-06 | Resolved: 2019-08-10

## 2019-08-10 PROBLEM — S92.015A CLOSED NONDISPLACED FRACTURE OF BODY OF LEFT CALCANEUS: Status: RESOLVED | Noted: 2019-05-03 | Resolved: 2019-08-10

## 2019-08-10 LAB
ALBUMIN SERPL BCP-MCNC: 2.4 G/DL (ref 3.5–5)
ALP SERPL-CCNC: 93 U/L (ref 46–116)
ALT SERPL W P-5'-P-CCNC: 9 U/L (ref 12–78)
ANION GAP SERPL CALCULATED.3IONS-SCNC: 6 MMOL/L (ref 4–13)
AST SERPL W P-5'-P-CCNC: 10 U/L (ref 5–45)
BASOPHILS # BLD AUTO: 0.03 THOUSANDS/ΜL (ref 0–0.1)
BASOPHILS NFR BLD AUTO: 0 % (ref 0–1)
BILIRUB SERPL-MCNC: 0.3 MG/DL (ref 0.2–1)
BUN SERPL-MCNC: 9 MG/DL (ref 5–25)
CALCIUM SERPL-MCNC: 8.3 MG/DL (ref 8.3–10.1)
CHLORIDE SERPL-SCNC: 109 MMOL/L (ref 100–108)
CO2 SERPL-SCNC: 27 MMOL/L (ref 21–32)
CREAT SERPL-MCNC: 0.89 MG/DL (ref 0.6–1.3)
EOSINOPHIL # BLD AUTO: 0.48 THOUSAND/ΜL (ref 0–0.61)
EOSINOPHIL NFR BLD AUTO: 5 % (ref 0–6)
ERYTHROCYTE [DISTWIDTH] IN BLOOD BY AUTOMATED COUNT: 13.6 % (ref 11.6–15.1)
EST. AVERAGE GLUCOSE BLD GHB EST-MCNC: 94 MG/DL
GFR SERPL CREATININE-BSD FRML MDRD: 96 ML/MIN/1.73SQ M
GLUCOSE SERPL-MCNC: 105 MG/DL (ref 65–140)
GLUCOSE SERPL-MCNC: 107 MG/DL (ref 65–140)
GLUCOSE SERPL-MCNC: 148 MG/DL (ref 65–140)
GLUCOSE SERPL-MCNC: 79 MG/DL (ref 65–140)
GLUCOSE SERPL-MCNC: 84 MG/DL (ref 65–140)
HBA1C MFR BLD: 4.9 % (ref 4.2–6.3)
HCT VFR BLD AUTO: 37.1 % (ref 36.5–49.3)
HGB BLD-MCNC: 11.3 G/DL (ref 12–17)
IMM GRANULOCYTES # BLD AUTO: 0.06 THOUSAND/UL (ref 0–0.2)
IMM GRANULOCYTES NFR BLD AUTO: 1 % (ref 0–2)
INR PPP: 1.23 (ref 0.84–1.19)
LYMPHOCYTES # BLD AUTO: 1.6 THOUSANDS/ΜL (ref 0.6–4.47)
LYMPHOCYTES NFR BLD AUTO: 18 % (ref 14–44)
MAGNESIUM SERPL-MCNC: 2 MG/DL (ref 1.6–2.6)
MCH RBC QN AUTO: 28.4 PG (ref 26.8–34.3)
MCHC RBC AUTO-ENTMCNC: 30.5 G/DL (ref 31.4–37.4)
MCV RBC AUTO: 93 FL (ref 82–98)
MONOCYTES # BLD AUTO: 0.6 THOUSAND/ΜL (ref 0.17–1.22)
MONOCYTES NFR BLD AUTO: 7 % (ref 4–12)
NEUTROPHILS # BLD AUTO: 6.35 THOUSANDS/ΜL (ref 1.85–7.62)
NEUTS SEG NFR BLD AUTO: 69 % (ref 43–75)
NRBC BLD AUTO-RTO: 0 /100 WBCS
PHOSPHATE SERPL-MCNC: 4 MG/DL (ref 2.7–4.5)
PLATELET # BLD AUTO: 179 THOUSANDS/UL (ref 149–390)
PMV BLD AUTO: 10.7 FL (ref 8.9–12.7)
POTASSIUM SERPL-SCNC: 4 MMOL/L (ref 3.5–5.3)
PROT SERPL-MCNC: 5.9 G/DL (ref 6.4–8.2)
PROTHROMBIN TIME: 15.7 SECONDS (ref 11.6–14.5)
RBC # BLD AUTO: 3.98 MILLION/UL (ref 3.88–5.62)
SODIUM SERPL-SCNC: 142 MMOL/L (ref 136–145)
WBC # BLD AUTO: 9.12 THOUSAND/UL (ref 4.31–10.16)

## 2019-08-10 PROCEDURE — 97535 SELF CARE MNGMENT TRAINING: CPT

## 2019-08-10 PROCEDURE — 82948 REAGENT STRIP/BLOOD GLUCOSE: CPT

## 2019-08-10 PROCEDURE — 80053 COMPREHEN METABOLIC PANEL: CPT | Performed by: INTERNAL MEDICINE

## 2019-08-10 PROCEDURE — 84100 ASSAY OF PHOSPHORUS: CPT | Performed by: INTERNAL MEDICINE

## 2019-08-10 PROCEDURE — 83036 HEMOGLOBIN GLYCOSYLATED A1C: CPT | Performed by: INTERNAL MEDICINE

## 2019-08-10 PROCEDURE — 83735 ASSAY OF MAGNESIUM: CPT | Performed by: INTERNAL MEDICINE

## 2019-08-10 PROCEDURE — 99232 SBSQ HOSP IP/OBS MODERATE 35: CPT | Performed by: INTERNAL MEDICINE

## 2019-08-10 PROCEDURE — 97530 THERAPEUTIC ACTIVITIES: CPT

## 2019-08-10 PROCEDURE — 85025 COMPLETE CBC W/AUTO DIFF WBC: CPT | Performed by: INTERNAL MEDICINE

## 2019-08-10 PROCEDURE — 85610 PROTHROMBIN TIME: CPT | Performed by: INTERNAL MEDICINE

## 2019-08-10 RX ADMIN — FLUDROCORTISONE ACETATE 0.1 MG: 0.1 TABLET ORAL at 08:52

## 2019-08-10 RX ADMIN — INSULIN GLARGINE 35 UNITS: 100 INJECTION, SOLUTION SUBCUTANEOUS at 08:59

## 2019-08-10 RX ADMIN — BACLOFEN 10 MG: 10 TABLET ORAL at 08:51

## 2019-08-10 RX ADMIN — LUBIPROSTONE 24 MCG: 24 CAPSULE, GELATIN COATED ORAL at 08:52

## 2019-08-10 RX ADMIN — OXYCODONE HYDROCHLORIDE 30 MG: 10 TABLET ORAL at 21:31

## 2019-08-10 RX ADMIN — HEPARIN SODIUM 5000 UNITS: 5000 INJECTION INTRAVENOUS; SUBCUTANEOUS at 13:44

## 2019-08-10 RX ADMIN — BACLOFEN 10 MG: 10 TABLET ORAL at 16:04

## 2019-08-10 RX ADMIN — GABAPENTIN 800 MG: 400 CAPSULE ORAL at 17:45

## 2019-08-10 RX ADMIN — CYANOCOBALAMIN TAB 500 MCG 500 MCG: 500 TAB at 08:51

## 2019-08-10 RX ADMIN — BACLOFEN 10 MG: 10 TABLET ORAL at 21:30

## 2019-08-10 RX ADMIN — CALCIUM CARBONATE 500 MG (1,250 MG)-VITAMIN D3 200 UNIT TABLET 2 TABLET: at 08:51

## 2019-08-10 RX ADMIN — FLUTICASONE FUROATE AND VILANTEROL TRIFENATATE 1 PUFF: 200; 25 POWDER RESPIRATORY (INHALATION) at 08:52

## 2019-08-10 RX ADMIN — HEPARIN SODIUM 5000 UNITS: 5000 INJECTION INTRAVENOUS; SUBCUTANEOUS at 21:36

## 2019-08-10 RX ADMIN — TRAZODONE HYDROCHLORIDE 100 MG: 100 TABLET ORAL at 21:29

## 2019-08-10 RX ADMIN — HEPARIN SODIUM 5000 UNITS: 5000 INJECTION INTRAVENOUS; SUBCUTANEOUS at 05:39

## 2019-08-10 RX ADMIN — MORPHINE SULFATE 60 MG: 30 TABLET, EXTENDED RELEASE ORAL at 08:59

## 2019-08-10 RX ADMIN — ACETAMINOPHEN 650 MG: 325 TABLET ORAL at 11:23

## 2019-08-10 RX ADMIN — Medication 1 TABLET: at 08:51

## 2019-08-10 RX ADMIN — POTASSIUM CHLORIDE 40 MEQ: 1500 TABLET, EXTENDED RELEASE ORAL at 08:51

## 2019-08-10 RX ADMIN — OXYCODONE HYDROCHLORIDE 30 MG: 10 TABLET ORAL at 06:59

## 2019-08-10 RX ADMIN — GABAPENTIN 800 MG: 400 CAPSULE ORAL at 08:51

## 2019-08-10 RX ADMIN — ATORVASTATIN CALCIUM 40 MG: 40 TABLET, FILM COATED ORAL at 16:04

## 2019-08-10 RX ADMIN — MORPHINE SULFATE 60 MG: 30 TABLET, EXTENDED RELEASE ORAL at 21:30

## 2019-08-10 RX ADMIN — ASPIRIN 81 MG: 81 TABLET, COATED ORAL at 08:51

## 2019-08-10 RX ADMIN — FOLIC ACID 1000 MCG: 1 TABLET ORAL at 09:05

## 2019-08-10 RX ADMIN — LUBIPROSTONE 24 MCG: 24 CAPSULE, GELATIN COATED ORAL at 16:04

## 2019-08-10 RX ADMIN — PANTOPRAZOLE SODIUM 40 MG: 40 TABLET, DELAYED RELEASE ORAL at 05:39

## 2019-08-10 NOTE — PLAN OF CARE
Problem: OCCUPATIONAL THERAPY ADULT  Goal: Performs self-care activities at highest level of function for planned discharge setting  See evaluation for individualized goals  Description  Treatment Interventions: ADL retraining, Cognitive reorientation, Endurance training, UE strengthening/ROM, Functional transfer training, Patient/family training, Equipment evaluation/education, Compensatory technique education, Energy conservation, Activityengagement          See flowsheet documentation for full assessment, interventions and recommendations  Outcome: Progressing  Note:   Limitation: Decreased ADL status, Decreased Safe judgement during ADL, Decreased UE strength, Decreased cognition, Decreased endurance, Decreased self-care trans, Decreased high-level ADLs, Decreased sensation  Prognosis: Good, Fair  Assessment: Pt was seen for skilled OT with focus on completion of self care tasks, functional mobility, review of fall prevention and review of current plan of care  Pt with need for redirection through out tx session due to inconsistent safety awareness and need for fall prevention  Pt attempted ADL routine with basic set up without use of rollator walker, standing on a towel barefoot  Recommended application of slipper socks without towel beneath to promote optimal safety with LE bathing/dress  SBA with step by step verbal cues required to redirect Pt to use safe techs with all aspects of ADL routine  Reviewed the fall prevention checklist with Pt  Written copy provided to review with his spouse  Pt reports having 3 dogs at home and using his cane to get in and OOB bathroom and kitchen due to having, "calderon", for the dogs  Recommended consistency with use of rollator walker with all functional tasks instance as well as remaining seated for LE bathing dressing with the exception of clothing management and shae care  See above levels of A required for all function tasks   Pt may benefit from further rehab with focus on achieving optimal performance levels with all functional tasks        OT Discharge Recommendation: Short Term Rehab  OT - OK to Discharge: Yes(when medically )

## 2019-08-10 NOTE — ASSESSMENT & PLAN NOTE
Suspected recurrent orthostatic hypotension  Orthostatic VS daily   Continue PT OT  Awaiting rehab placement

## 2019-08-10 NOTE — ASSESSMENT & PLAN NOTE
Lab Results   Component Value Date    HGBA1C 4 9 08/10/2019       Recent Labs     08/09/19  2055 08/10/19  0704 08/10/19  1120 08/10/19  1553   POCGLU 117 84 105 148*       Blood Sugar Average: Last 72 hrs:  (P) 112 125Continue home 35 units QAM as he does at home  SSI coverage with ACCU checks   Diabetic diet     Check hemoglobin A1c

## 2019-08-10 NOTE — ASSESSMENT & PLAN NOTE
Wt Readings from Last 3 Encounters:   08/08/19 108 kg (239 lb)   06/25/19 111 kg (244 lb 6 4 oz)   06/12/19 117 kg (259 lb)     Currently euvolemic   Last Echo showing EF of 60%   Recently taken off torsemide for hypotension  Cardiology recommended increased fluid and salt intake

## 2019-08-10 NOTE — OCCUPATIONAL THERAPY NOTE
Occupational Therapy Treatment Note:         08/10/19 1014   Restrictions/Precautions   Weight Bearing Precautions Per Order No   Other Precautions Fall Risk;Pain   Pain Assessment   Pain Assessment 0-10   Pain Score 7   Pain Type Acute pain;Chronic pain   Pain Location Back   Pain Orientation Lower   ADL   Where Assessed Chair   Grooming Assistance 4  Minimal Assistance   Grooming Deficit Setup;Supervision/safety;Verbal cueing; Increased time to complete;Wash/dry hands; Wash/dry face;Brushing hair   UB Bathing Assistance 5  Supervision/Setup   UB Bathing Deficit Setup;Supervision/safety; Increased time to complete   LB Bathing Assistance 5  Supervision/Setup   LB Bathing Deficit Setup;Verbal cueing;Supervision/safety; Increased time to complete;Perineal area; Buttocks   LB Bathing Comments cues for safety required with all aspects of activity  UB Dressing Assistance 5  Supervision/Setup   UB Dressing Deficit Setup   LB Dressing Assistance 5  Supervision/Setup   LB Dressing Deficit Setup;Steadying; Requires assistive device for steadying;Verbal cueing; Increased time to complete;Supervision/safety; Don/doff L sock; Don/doff R sock; Thread RLE into pants; Thread RLE into underwear; Thread LLE into pants; Thread LLE into underwear;Pull up over hips   LB Dressing Comments cues for safe techs required  Toileting Assistance  4  Minimal Assistance   Toileting Deficit Setup;Steadying;Verbal cueing;Supervison/safety; Increased time to complete; Bedside commode;Clothing management up;Clothing management down;Perineal hygiene;Grab bar use   Toileting Comments poor safety awareness noted with activity  Functional Standing Tolerance   Time 2 mins   Activity dynamic stand balance activity  Comments poor safety awareness noted      Bed Mobility   Supine to Sit Unable to assess   Transfers   Sit to Stand 5  Supervision   Additional items Assist x 1   Stand to Sit 5  Supervision   Additional items Assist x 1   Stand pivot 5 Supervision   Additional items Assist x 1; Increased time required;Armrests; Verbal cues   Toilet transfer 5  Supervision   Additional items Assist x 1; Increased time required;Commode   Additional Comments cues for safety required  Functional Mobility   Functional Mobility 4  Minimal assistance   Additional Comments x1   Additional items Rolling walker   Cognition   Overall Cognitive Status WFL   Arousal/Participation Alert; Responsive   Attention Attends with cues to redirect   Orientation Level Oriented X4   Memory Decreased recall of precautions;Decreased short term memory   Following Commands Follows one step commands without difficulty   Comments Questionable carry over of newly learned information  Poor safety awareness noted  Additional Activities   Additional Activities Other (Comment)  (reviewed the fall prevention checklsit)   Additional Activities Comments Pt reports understanding and that he will review with his spouse  Activity Tolerance   Activity Tolerance Patient limited by pain; Patient limited by fatigue   Medical Staff Made Aware reported all findings to nursing staff  Assessment   Assessment Pt was seen for skilled OT with focus on completion of self care tasks, functional mobility, review of fall prevention and review of current plan of care  Pt with need for redirection through out tx session due to inconsistent safety awareness and need for fall prevention  Pt attempted ADL routine with basic set up without use of rollator walker, standing on a towel barefoot  Recommended application of slipper socks without towel beneath to promote optimal safety with LE bathing/dress  SBA with step by step verbal cues required to redirect Pt to use safe techs with all aspects of ADL routine  Reviewed the fall prevention checklist with Pt  Written copy provided to review with his spouse   Pt reports having 3 dogs at home and using his cane to get in and OOB bathroom and kitchen due to having, "calderon", for the dogs  Recommended consistency with use of rollator walker with all functional tasks instance as well as remaining seated for LE bathing dressing with the exception of clothing management and shae care  See above levels of A required for all function tasks  Pt may benefit from further rehab with focus on achieving optimal performance levels with all functional tasks  Plan   Treatment Interventions ADL retraining;Functional transfer training; Endurance training;Cognitive reorientation   Goal Expiration Date 08/23/19   Treatment Day 1   OT Frequency 3-5x/wk   Recommendation   OT Discharge Recommendation Short Term Rehab   OT - OK to Discharge Yes  (when medically )   Barthel Index   Feeding 10   Bathing 0   Grooming Score 0   Dressing Score 5   Bladder Score 10   Bowels Score 10   Toilet Use Score 5   Transfers (Bed/Chair) Score 10   Mobility (Level Surface) Score 0   Stairs Score 0   Barthel Index Score 50   Modified Consuelo Scale   Modified [place]field Scale 4   Dorothy Hernandez, [address]

## 2019-08-10 NOTE — ASSESSMENT & PLAN NOTE
Reports history of chronic pain disorder , chronic opiate dependence  Continue home regimen of MS Contin 60 MG bid with PRN Oxycodone 30 mg q 6   Up with assistance  PT/OT consults

## 2019-08-10 NOTE — NURSING NOTE
Patient out of bed to chair this morning for about 2 hours  Used rolling walker from home to ambulate around the bed  Oxy given this morning at change of shift, then MS Contin at 0900, then Tylenol for headache at 1100  Agreeable for Oxy again at 1500  States he takes both MS Contin and Oxy together at home  Please advise nursing if this is not the case  Wound dressing changed, then back into bed  Therapy worked with patient this morning completing ADLs  Patient instructed to call for help from bed to chair  All personal belongings by bedside

## 2019-08-10 NOTE — ASSESSMENT & PLAN NOTE
Patient presents following two falls at home yesterday and today preceded by lightheadedness  Suspected orthostatic hypotension, suspect that telemetry monitor was under sensing the heart rate    Activity level is slightly improved today    Recommendation is for discharge to rehab    Does have history of multiple hospitalizations due to falls at home   Cardiology input appreciated    Not currently on any beta-blockers  Is on high dose opioid pain medication regimen   Monitor on telemetry   Appreciate Cardiology evaluation  Orthostatics daily     Florinef initiated 8/9/2019 by Cardiology

## 2019-08-10 NOTE — PLAN OF CARE
Problem: Potential for Falls  Goal: Patient will remain free of falls  Description  INTERVENTIONS:  - Assess patient frequently for physical needs  -  Identify cognitive and physical deficits and behaviors that affect risk of falls  -  [place] fall precautions as indicated by assessment   - Educate patient/family on patient safety including physical limitations  - Instruct patient to call for assistance with activity based on assessment  - Modify environment to reduce risk of injury  - Consider OT/PT consult to assist with strengthening/mobility  Outcome: Progressing     Problem: Prexisting or High Potential for Compromised Skin Integrity  Goal: Skin integrity is maintained or improved  Description  INTERVENTIONS:  - Identify patients at risk for skin breakdown  - Assess and monitor skin integrity  - Assess and monitor nutrition and hydration status  - Monitor labs (i e  albumin)  - Assess for incontinence   - Turn and reposition patient  - Assist with mobility/ambulation  - Relieve pressure over bony prominences  - Avoid friction and shearing  - Provide appropriate hygiene as needed including keeping skin clean and dry  - Evaluate need for skin moisturizer/barrier cream  - Collaborate with interdisciplinary team (i e  Nutrition, Rehabilitation, etc )   - Patient/family teaching  Outcome: Progressing     Problem: Nutrition/Hydration-ADULT  Goal: Nutrient/Hydration intake appropriate for improving, restoring or maintaining nutritional needs  Description  Monitor and assess patient's nutrition/hydration status for malnutrition (ex- brittle hair, bruises, dry skin, pale skin and conjunctiva, muscle wasting, smooth red tongue, and disorientation)  Collaborate with interdisciplinary team and initiate plan and interventions as ordered  Monitor patient's weight and dietary intake as ordered or per policy  Utilize nutrition screening tool and intervene per policy   Determine patient's food preferences and provide high-protein, high-caloric foods as appropriate       INTERVENTIONS:  - Monitor oral intake, urinary output, labs, and treatment plans  - Assess nutrition and hydration status and recommend course of action  - Evaluate amount of meals eaten  - Assist patient with eating if necessary   - Allow adequate time for meals  - Recommend/ encourage appropriate diets, oral nutritional supplements, and vitamin/mineral supplements  - Order, calculate, and assess calorie counts as needed  - Recommend, monitor, and adjust tube feedings and TPN/PPN based on assessed needs  - Assess need for intravenous fluids  - Provide specific nutrition/hydration education as appropriate  - Include patient/family/caregiver in decisions related to nutrition  Outcome: Progressing     Problem: PAIN - ADULT  Goal: Verbalizes/displays adequate comfort level or baseline comfort level  Description  Interventions:  - Encourage patient to monitor pain and request assistance  - Assess pain using appropriate pain scale  - Administer analgesics based on type and severity of pain and evaluate response  - Implement non-pharmacological measures as appropriate and evaluate response  - Consider cultural and social influences on pain and pain management  - Notify physician/advanced practitioner if interventions unsuccessful or patient reports new pain  Outcome: Progressing     Problem: INFECTION - ADULT  Goal: Absence or prevention of progression during hospitalization  Description  INTERVENTIONS:  - Assess and monitor for signs and symptoms of infection  - Monitor lab/diagnostic results  - Monitor all insertion sites, i e  indwelling lines, tubes, and drains  - Monitor endotracheal (as able) and nasal secretions for changes in amount and color  - [place] appropriate cooling/warming therapies per order  - Administer medications as ordered  - Instruct and encourage patient and family to use good hand hygiene technique  - Identify and instruct in appropriate isolation precautions for identified infection/condition  Outcome: Progressing     Problem: SAFETY ADULT  Goal: Patient will remain free of falls  Description  INTERVENTIONS:  - Assess patient frequently for physical needs  -  Identify cognitive and physical deficits and behaviors that affect risk of falls    -  [place] fall precautions as indicated by assessment   - Educate patient/family on patient safety including physical limitations  - Instruct patient to call for assistance with activity based on assessment  - Modify environment to reduce risk of injury  - Consider OT/PT consult to assist with strengthening/mobility  Outcome: Progressing  Goal: Maintain or return to baseline ADL function  Description  INTERVENTIONS:  -  Assess patient's ability to carry out ADLs; assess patient's baseline for ADL function and identify physical deficits which impact ability to perform ADLs (bathing, care of mouth/teeth, toileting, grooming, dressing, etc )  - Assess/evaluate cause of self-care deficits   - Assess range of motion  - Assess patient's mobility; develop plan if impaired  - Assess patient's need for assistive devices and provide as appropriate  - Encourage maximum independence but intervene and supervise when necessary  ¯ Involve family in performance of ADLs  ¯ Assess for home care needs following discharge   ¯ Request OT consult to assist with ADL evaluation and planning for discharge  ¯ Provide patient education as appropriate  Outcome: Progressing  Goal: Maintain or return mobility status to optimal level  Description  INTERVENTIONS:  - Assess patient's baseline mobility status (ambulation, transfers, stairs, etc )    - Identify cognitive and physical deficits and behaviors that affect mobility  - Identify mobility aids required to assist with transfers and/or ambulation (gait belt, sit-to-stand, lift, walker, cane, etc )  - [place] fall precautions as indicated by assessment  - Record patient progress and toleration of activity level on Mobility SBAR; progress patient to next Phase/Stage  - Instruct patient to call for assistance with activity based on assessment  - Request Rehabilitation consult to assist with strengthening/weightbearing, etc   Outcome: Progressing     Problem: DISCHARGE PLANNING  Goal: Discharge to home or other facility with appropriate resources  Description  INTERVENTIONS:  - Identify barriers to discharge w/patient and caregiver  - Arrange for needed discharge resources and transportation as appropriate  - Identify discharge learning needs (meds, wound care, etc )  - Arrange for interpretive services to assist at discharge as needed  - Refer to Case Management Department for coordinating discharge planning if the patient needs post-hospital services based on physician/advanced practitioner order or complex needs related to functional status, cognitive ability, or social support system  Outcome: Progressing     Problem: Knowledge Deficit  Goal: Patient/family/caregiver demonstrates understanding of disease process, treatment plan, medications, and discharge instructions  Description  Complete learning assessment and assess knowledge base  Interventions:  - Provide teaching at level of understanding  - Provide teaching via preferred learning methods  Outcome: Progressing     Problem: CARDIOVASCULAR - ADULT  Goal: Maintains optimal cardiac output and hemodynamic stability  Description  INTERVENTIONS:  - Monitor I/O, vital signs and rhythm  - Monitor for S/S and trends of decreased cardiac output i e  bleeding, hypotension  - Administer and titrate ordered vasoactive medications to optimize hemodynamic stability  - Assess quality of pulses, skin color and temperature  - Assess for signs of decreased coronary artery perfusion - ex   Angina  - Instruct patient to report change in severity of symptoms  Outcome: Progressing  Goal: Absence of cardiac dysrhythmias or at baseline rhythm  Description  INTERVENTIONS:  - Continuous cardiac monitoring, monitor vital signs, obtain 12 lead EKG if indicated  - Administer antiarrhythmic and heart rate control medications as ordered  - Monitor electrolytes and administer replacement therapy as ordered  Outcome: Progressing     Problem: SKIN/TISSUE INTEGRITY - ADULT  Goal: Skin integrity remains intact  Description  INTERVENTIONS  - Identify patients at risk for skin breakdown  - Assess and monitor skin integrity  - Assess and monitor nutrition and hydration status  - Monitor labs (i e  albumin)  - Assess for incontinence   - Turn and reposition patient  - Assist with mobility/ambulation  - Relieve pressure over bony prominences  - Avoid friction and shearing  - Provide appropriate hygiene as needed including keeping skin clean and dry  - Evaluate need for skin moisturizer/barrier cream  - Collaborate with interdisciplinary team (i e  Nutrition, Rehabilitation, etc )   - Patient/family teaching  Outcome: Progressing  Goal: Incision(s), wounds(s) or drain site(s) healing without S/S of infection  Description  INTERVENTIONS  - Assess and document risk factors for skin impairment   - Assess and document dressing, incision, wound bed, drain sites and surrounding tissue  - Initiate Nutrition services consult and/or wound management as needed  Outcome: Progressing  Goal: Oral mucous membranes remain intact  Description  INTERVENTIONS  - Assess oral mucosa and hygiene practices  - Implement preventative oral hygiene regimen  - Implement oral medicated treatments as ordered  - Initiate Nutrition services referral as needed  Outcome: Progressing     Problem: METABOLIC, FLUID AND ELECTROLYTES - ADULT  Goal: Glucose maintained within target range  Description  INTERVENTIONS:  - Monitor Blood Glucose as ordered  - Assess for signs and symptoms of hyperglycemia and hypoglycemia  - Administer ordered medications to maintain glucose within target range  - Assess nutritional intake and initiate nutrition service referral as needed  Outcome: Progressing

## 2019-08-10 NOTE — PROGRESS NOTES
Progress Note - Cheng Tran 1960, 66 y o  male MRN: [id number]    Unit/Bed#: E4 -01 Encounter: [id number]    Primary Care Provider: Elkin Zhang MD   Date and time admitted to hospital: 8/8/2019  3:45 PM        * Symptomatic bradycardia  Assessment & Plan  Patient presents following two falls at home yesterday and today preceded by lightheadedness  Suspected orthostatic hypotension, suspect that telemetry monitor was under sensing the heart rate    Activity level is slightly improved today  Recommendation is for discharge to rehab    Does have history of multiple hospitalizations due to falls at home   Cardiology input appreciated    Not currently on any beta-blockers  Is on high dose opioid pain medication regimen   Monitor on telemetry   Appreciate Cardiology evaluation  Orthostatics daily     Florinef initiated 8/9/2019 by Cardiology    Fall at home  Assessment & Plan  Suspected recurrent orthostatic hypotension  Orthostatic VS daily   Continue PT OT  Awaiting rehab placement      Stage 3 chronic kidney disease ([address] )  Assessment & Plan  Renal function appears to be better than baseline  Avoid hypotension and nephrotoxins       Chronic pain disorder  Assessment & Plan  Reports history of chronic pain disorder , chronic opiate dependence  Continue home regimen of MS Contin 60 MG bid with PRN Oxycodone 30 mg q 6   Up with assistance  PT/OT consults     Chronic diastolic congestive heart failure (HCC)  Assessment & Plan  Wt Readings from Last 3 Encounters:   08/08/19 108 kg (239 lb)   06/25/19 111 kg (244 lb 6 4 oz)   06/12/19 117 kg (259 lb)     Currently euvolemic   Last Echo showing EF of 60%   Recently taken off torsemide for hypotension  Cardiology recommended increased fluid and salt intake    Type 2 diabetes mellitus with renal complication [facility])  Assessment & Plan  Lab Results   Component Value Date    HGBA1C 4 9 08/10/2019       Recent Labs     08/09/19 2055 08/10/19  0704 08/10/19  1120 08/10/19  1553   POCGLU 117 84 105 148*       Blood Sugar Average: Last 72 hrs:  (P) 112 125Continue home 35 units QAM as he does at home  SSI coverage with ACCU checks   Diabetic diet     Check hemoglobin A1c    Pressure injury of skin of sacral region  Assessment & Plan  Known sacral decubitus ulcer ,Present on admission 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  Has wound care coming to house every other day   Continue local wound care and frequent turns while inpatient        VTE Pharmacologic Prophylaxis:   Pharmacologic: Heparin  Mechanical VTE Prophylaxis in Place: Yes    Patient Centered Rounds: I have performed bedside rounds with nursing staff today  Current Length of Stay: 2 day(s)    Current Patient Status: Inpatient   Certification Statement: The patient will continue to require additional inpatient hospital stay due to Monitoring of orthostatics      Code Status: Level 1 - Full Code      Subjective:   No new pain    Objective:     Vitals:   Temp (24hrs), Av 1 °F (36 7 °C), Min:97 3 °F (36 3 °C), Max:98 7 °F (37 1 °C)    Temp:  [97 3 °F (36 3 °C)-98 7 °F (37 1 °C)] 98 1 °F (36 7 °C)  HR:  [52-71] 61  Resp:  [18] 18  BP: ()/(46-56) 115/56  SpO2:  [90 %-98 %] 98 %  Body mass index is 32 41 kg/m²  Input and Output Summary (last 24 hours):        Intake/Output Summary (Last 24 hours) at 8/10/2019 1635  Last data filed at 8/10/2019 0811  Gross per 24 hour   Intake 480 ml   Output 1150 ml   Net -670 ml       Physical Exam:     Physical Exam  NAd  Lungs CTA BL  Abd soft NT ND  No focal defecits    Additional Data:     Labs:    Results from last 7 days   Lab Units 08/10/19  0541   WBC Thousand/uL 9 12   HEMOGLOBIN g/dL 11 3*   HEMATOCRIT % 37 1   PLATELETS Thousands/uL 179   NEUTROS PCT % 69   LYMPHS PCT % 18   MONOS PCT % 7   EOS PCT % 5     Results from last 7 days   Lab Units 08/10/19  0541   POTASSIUM mmol/L 4 0   CHLORIDE mmol/L 109*   CO2 mmol/L 27 BUN mg/dL 9   CREATININE mg/dL 0 89   CALCIUM mg/dL 8 3   ALK PHOS U/L 93   ALT U/L 9*   AST U/L 10     Results from last 7 days   Lab Units 08/10/19  0541   INR  1 23*       * I Have Reviewed All Lab Data Listed Above  * Additional Pertinent Lab Tests Reviewed:  Alyse Aponte Admission Reviewed      Recent Cultures (last 7 days):           Last 24 Hours Medication List:     Current Facility-Administered Medications:  acetaminophen 650 mg Oral Q6H PRN Amy Smudde, PA-C   albuterol 2 puff Inhalation Q6H PRN Amy Smudde, PA-C   albuterol 1 25 mg Nebulization Q6H PRN Amy Smudde, PA-C   aluminum-magnesium hydroxide-simethicone 30 mL Oral Q6H PRN Amy Smudde, PA-C   aspirin 81 mg Oral Daily Amy Smudde, PA-C   atorvastatin 40 mg Oral Daily With THIAGO Barkley, PA-THIAGO   baclofen 10 mg Oral TID ANTHONY Arriaza-THIAGO   calcium carbonate-vitamin D 2 tablet Oral Daily With Breakfast Amy Xu, PA-C   vitamin B-12 500 mcg Oral Daily Amy Smudde, PA-C   fludrocortisone 0 1 mg Oral Daily Enoc Dickens MD   fluticasone-vilanterol 1 puff Inhalation Daily Amy Smudde, PA-C   folic acid 9,814 mcg Oral Daily Amy Smudde, PA-C   gabapentin 800 mg Oral BID Amy Smudde, PA-C   heparin (porcine) 5,000 Units Subcutaneous Q8H [address] Amy Smudde, PA-C   hydrOXYzine HCL 25 mg Oral Q6H PRN Kalin Shanks DO   insulin glargine 35 Units Subcutaneous QAM Amy Smudde, PA-C   insulin lispro 1-5 Units Subcutaneous HS Amy Smudde, PA-C   insulin lispro 1-6 Units Subcutaneous TID AC Amy Smudde, PA-C   lubiprostone 24 mcg Oral BID With Meals Anabell Munroe PA-C   morphine 60 mg Oral Q12H [address] Amy Smudde, PA-C   multivitamin-minerals 1 tablet Oral Daily Amy Smudde, PA-C   ondansetron 4 mg Intravenous Q6H PRN Amy Mcnair PA-C   oxyCODONE 30 mg Oral Q6H PRN Amy Mcnair PA-C   pantoprazole 40 mg Oral Early Morning Amy Mcnair PA-C   polyethylene glycol 17 g Oral Daily PRN Anabell Munroe PA-C potassium chloride 40 mEq Oral Daily Amy Mcnair PA-C   prochlorperazine 5 mg Oral Q6H PRN Betina Flores DO   traZODone 100 mg Oral HS Amy Mcnair PA-C   zolpidem 10 mg Oral HS PRN [place] , JANNA        Today, Patient Was Seen By: Betina Flores DO

## 2019-08-11 ENCOUNTER — APPOINTMENT (INPATIENT)
Dept: RADIOLOGY | Facility: HOSPITAL | Age: 59
DRG: 308 | End: 2019-08-11
Payer: MEDICARE

## 2019-08-11 ENCOUNTER — APPOINTMENT (INPATIENT)
Dept: NON INVASIVE DIAGNOSTICS | Facility: HOSPITAL | Age: 59
DRG: 308 | End: 2019-08-11
Payer: MEDICARE

## 2019-08-11 PROBLEM — M79.671 BILATERAL FOOT PAIN: Status: ACTIVE | Noted: 2019-08-11

## 2019-08-11 PROBLEM — M79.672 BILATERAL FOOT PAIN: Status: ACTIVE | Noted: 2019-08-11

## 2019-08-11 LAB
GLUCOSE SERPL-MCNC: 158 MG/DL (ref 65–140)
GLUCOSE SERPL-MCNC: 79 MG/DL (ref 65–140)
GLUCOSE SERPL-MCNC: 80 MG/DL (ref 65–140)
GLUCOSE SERPL-MCNC: 86 MG/DL (ref 65–140)

## 2019-08-11 PROCEDURE — 73610 X-RAY EXAM OF ANKLE: CPT

## 2019-08-11 PROCEDURE — 99232 SBSQ HOSP IP/OBS MODERATE 35: CPT | Performed by: INTERNAL MEDICINE

## 2019-08-11 PROCEDURE — 93306 TTE W/DOPPLER COMPLETE: CPT

## 2019-08-11 PROCEDURE — 82948 REAGENT STRIP/BLOOD GLUCOSE: CPT

## 2019-08-11 RX ADMIN — MORPHINE SULFATE 60 MG: 30 TABLET, EXTENDED RELEASE ORAL at 21:39

## 2019-08-11 RX ADMIN — PANTOPRAZOLE SODIUM 40 MG: 40 TABLET, DELAYED RELEASE ORAL at 05:45

## 2019-08-11 RX ADMIN — HEPARIN SODIUM 5000 UNITS: 5000 INJECTION INTRAVENOUS; SUBCUTANEOUS at 21:41

## 2019-08-11 RX ADMIN — HEPARIN SODIUM 5000 UNITS: 5000 INJECTION INTRAVENOUS; SUBCUTANEOUS at 14:31

## 2019-08-11 RX ADMIN — TRAZODONE HYDROCHLORIDE 100 MG: 100 TABLET ORAL at 21:38

## 2019-08-11 RX ADMIN — OXYCODONE HYDROCHLORIDE 30 MG: 10 TABLET ORAL at 08:47

## 2019-08-11 RX ADMIN — MORPHINE SULFATE 60 MG: 30 TABLET, EXTENDED RELEASE ORAL at 08:27

## 2019-08-11 RX ADMIN — Medication 1 TABLET: at 08:27

## 2019-08-11 RX ADMIN — GABAPENTIN 800 MG: 400 CAPSULE ORAL at 17:44

## 2019-08-11 RX ADMIN — ASPIRIN 81 MG: 81 TABLET, COATED ORAL at 08:27

## 2019-08-11 RX ADMIN — OXYCODONE HYDROCHLORIDE 30 MG: 10 TABLET ORAL at 22:13

## 2019-08-11 RX ADMIN — GABAPENTIN 800 MG: 400 CAPSULE ORAL at 08:27

## 2019-08-11 RX ADMIN — FLUTICASONE FUROATE AND VILANTEROL TRIFENATATE 1 PUFF: 200; 25 POWDER RESPIRATORY (INHALATION) at 08:26

## 2019-08-11 RX ADMIN — CYANOCOBALAMIN TAB 500 MCG 500 MCG: 500 TAB at 08:27

## 2019-08-11 RX ADMIN — CALCIUM CARBONATE 500 MG (1,250 MG)-VITAMIN D3 200 UNIT TABLET 2 TABLET: at 08:27

## 2019-08-11 RX ADMIN — HEPARIN SODIUM 5000 UNITS: 5000 INJECTION INTRAVENOUS; SUBCUTANEOUS at 05:46

## 2019-08-11 RX ADMIN — OXYCODONE HYDROCHLORIDE 30 MG: 10 TABLET ORAL at 16:15

## 2019-08-11 RX ADMIN — LUBIPROSTONE 24 MCG: 24 CAPSULE, GELATIN COATED ORAL at 08:27

## 2019-08-11 RX ADMIN — INSULIN GLARGINE 35 UNITS: 100 INJECTION, SOLUTION SUBCUTANEOUS at 08:27

## 2019-08-11 RX ADMIN — POTASSIUM CHLORIDE 40 MEQ: 1500 TABLET, EXTENDED RELEASE ORAL at 08:27

## 2019-08-11 RX ADMIN — ATORVASTATIN CALCIUM 40 MG: 40 TABLET, FILM COATED ORAL at 16:14

## 2019-08-11 RX ADMIN — BACLOFEN 10 MG: 10 TABLET ORAL at 16:14

## 2019-08-11 RX ADMIN — BACLOFEN 10 MG: 10 TABLET ORAL at 08:27

## 2019-08-11 RX ADMIN — LUBIPROSTONE 24 MCG: 24 CAPSULE, GELATIN COATED ORAL at 16:14

## 2019-08-11 RX ADMIN — FOLIC ACID 1000 MCG: 1 TABLET ORAL at 08:27

## 2019-08-11 RX ADMIN — FLUDROCORTISONE ACETATE 0.1 MG: 0.1 TABLET ORAL at 08:27

## 2019-08-11 RX ADMIN — BACLOFEN 10 MG: 10 TABLET ORAL at 21:38

## 2019-08-11 NOTE — PLAN OF CARE
Problem: Potential for Falls  Goal: Patient will remain free of falls  Description  INTERVENTIONS:  - Assess patient frequently for physical needs  -  Identify cognitive and physical deficits and behaviors that affect risk of falls  -  [place] fall precautions as indicated by assessment   - Educate patient/family on patient safety including physical limitations  - Instruct patient to call for assistance with activity based on assessment  - Modify environment to reduce risk of injury  - Consider OT/PT consult to assist with strengthening/mobility  Outcome: Progressing     Problem: Prexisting or High Potential for Compromised Skin Integrity  Goal: Skin integrity is maintained or improved  Description  INTERVENTIONS:  - Identify patients at risk for skin breakdown  - Assess and monitor skin integrity  - Assess and monitor nutrition and hydration status  - Monitor labs (i e  albumin)  - Assess for incontinence   - Turn and reposition patient  - Assist with mobility/ambulation  - Relieve pressure over bony prominences  - Avoid friction and shearing  - Provide appropriate hygiene as needed including keeping skin clean and dry  - Evaluate need for skin moisturizer/barrier cream  - Collaborate with interdisciplinary team (i e  Nutrition, Rehabilitation, etc )   - Patient/family teaching  Outcome: Progressing     Problem: Nutrition/Hydration-ADULT  Goal: Nutrient/Hydration intake appropriate for improving, restoring or maintaining nutritional needs  Description  Monitor and assess patient's nutrition/hydration status for malnutrition (ex- brittle hair, bruises, dry skin, pale skin and conjunctiva, muscle wasting, smooth red tongue, and disorientation)  Collaborate with interdisciplinary team and initiate plan and interventions as ordered  Monitor patient's weight and dietary intake as ordered or per policy  Utilize nutrition screening tool and intervene per policy   Determine patient's food preferences and provide high-protein, high-caloric foods as appropriate       INTERVENTIONS:  - Monitor oral intake, urinary output, labs, and treatment plans  - Assess nutrition and hydration status and recommend course of action  - Evaluate amount of meals eaten  - Assist patient with eating if necessary   - Allow adequate time for meals  - Recommend/ encourage appropriate diets, oral nutritional supplements, and vitamin/mineral supplements  - Order, calculate, and assess calorie counts as needed  - Recommend, monitor, and adjust tube feedings and TPN/PPN based on assessed needs  - Assess need for intravenous fluids  - Provide specific nutrition/hydration education as appropriate  - Include patient/family/caregiver in decisions related to nutrition  Outcome: Progressing     Problem: PAIN - ADULT  Goal: Verbalizes/displays adequate comfort level or baseline comfort level  Description  Interventions:  - Encourage patient to monitor pain and request assistance  - Assess pain using appropriate pain scale  - Administer analgesics based on type and severity of pain and evaluate response  - Implement non-pharmacological measures as appropriate and evaluate response  - Consider cultural and social influences on pain and pain management  - Notify physician/advanced practitioner if interventions unsuccessful or patient reports new pain  Outcome: Progressing     Problem: INFECTION - ADULT  Goal: Absence or prevention of progression during hospitalization  Description  INTERVENTIONS:  - Assess and monitor for signs and symptoms of infection  - Monitor lab/diagnostic results  - Monitor all insertion sites, i e  indwelling lines, tubes, and drains  - Monitor endotracheal (as able) and nasal secretions for changes in amount and color  - [place] appropriate cooling/warming therapies per order  - Administer medications as ordered  - Instruct and encourage patient and family to use good hand hygiene technique  - Identify and instruct in appropriate isolation precautions for identified infection/condition  Outcome: Progressing     Problem: SAFETY ADULT  Goal: Patient will remain free of falls  Description  INTERVENTIONS:  - Assess patient frequently for physical needs  -  Identify cognitive and physical deficits and behaviors that affect risk of falls    -  [place] fall precautions as indicated by assessment   - Educate patient/family on patient safety including physical limitations  - Instruct patient to call for assistance with activity based on assessment  - Modify environment to reduce risk of injury  - Consider OT/PT consult to assist with strengthening/mobility  Outcome: Progressing  Goal: Maintain or return to baseline ADL function  Description  INTERVENTIONS:  -  Assess patient's ability to carry out ADLs; assess patient's baseline for ADL function and identify physical deficits which impact ability to perform ADLs (bathing, care of mouth/teeth, toileting, grooming, dressing, etc )  - Assess/evaluate cause of self-care deficits   - Assess range of motion  - Assess patient's mobility; develop plan if impaired  - Assess patient's need for assistive devices and provide as appropriate  - Encourage maximum independence but intervene and supervise when necessary  ¯ Involve family in performance of ADLs  ¯ Assess for home care needs following discharge   ¯ Request OT consult to assist with ADL evaluation and planning for discharge  ¯ Provide patient education as appropriate  Outcome: Progressing  Goal: Maintain or return mobility status to optimal level  Description  INTERVENTIONS:  - Assess patient's baseline mobility status (ambulation, transfers, stairs, etc )    - Identify cognitive and physical deficits and behaviors that affect mobility  - Identify mobility aids required to assist with transfers and/or ambulation (gait belt, sit-to-stand, lift, walker, cane, etc )  - [place] fall precautions as indicated by assessment  - Record patient progress and toleration of activity level on Mobility SBAR; progress patient to next Phase/Stage  - Instruct patient to call for assistance with activity based on assessment  - Request Rehabilitation consult to assist with strengthening/weightbearing, etc   Outcome: Progressing     Problem: DISCHARGE PLANNING  Goal: Discharge to home or other facility with appropriate resources  Description  INTERVENTIONS:  - Identify barriers to discharge w/patient and caregiver  - Arrange for needed discharge resources and transportation as appropriate  - Identify discharge learning needs (meds, wound care, etc )  - Arrange for interpretive services to assist at discharge as needed  - Refer to Case Management Department for coordinating discharge planning if the patient needs post-hospital services based on physician/advanced practitioner order or complex needs related to functional status, cognitive ability, or social support system  Outcome: Progressing     Problem: Knowledge Deficit  Goal: Patient/family/caregiver demonstrates understanding of disease process, treatment plan, medications, and discharge instructions  Description  Complete learning assessment and assess knowledge base  Interventions:  - Provide teaching at level of understanding  - Provide teaching via preferred learning methods  Outcome: Progressing     Problem: CARDIOVASCULAR - ADULT  Goal: Maintains optimal cardiac output and hemodynamic stability  Description  INTERVENTIONS:  - Monitor I/O, vital signs and rhythm  - Monitor for S/S and trends of decreased cardiac output i e  bleeding, hypotension  - Administer and titrate ordered vasoactive medications to optimize hemodynamic stability  - Assess quality of pulses, skin color and temperature  - Assess for signs of decreased coronary artery perfusion - ex   Angina  - Instruct patient to report change in severity of symptoms  Outcome: Progressing  Goal: Absence of cardiac dysrhythmias or at baseline rhythm  Description  INTERVENTIONS:  - Continuous cardiac monitoring, monitor vital signs, obtain 12 lead EKG if indicated  - Administer antiarrhythmic and heart rate control medications as ordered  - Monitor electrolytes and administer replacement therapy as ordered  Outcome: Progressing     Problem: SKIN/TISSUE INTEGRITY - ADULT  Goal: Skin integrity remains intact  Description  INTERVENTIONS  - Identify patients at risk for skin breakdown  - Assess and monitor skin integrity  - Assess and monitor nutrition and hydration status  - Monitor labs (i e  albumin)  - Assess for incontinence   - Turn and reposition patient  - Assist with mobility/ambulation  - Relieve pressure over bony prominences  - Avoid friction and shearing  - Provide appropriate hygiene as needed including keeping skin clean and dry  - Evaluate need for skin moisturizer/barrier cream  - Collaborate with interdisciplinary team (i e  Nutrition, Rehabilitation, etc )   - Patient/family teaching  Outcome: Progressing  Goal: Incision(s), wounds(s) or drain site(s) healing without S/S of infection  Description  INTERVENTIONS  - Assess and document risk factors for skin impairment   - Assess and document dressing, incision, wound bed, drain sites and surrounding tissue  - Initiate Nutrition services consult and/or wound management as needed  Outcome: Progressing  Goal: Oral mucous membranes remain intact  Description  INTERVENTIONS  - Assess oral mucosa and hygiene practices  - Implement preventative oral hygiene regimen  - Implement oral medicated treatments as ordered  - Initiate Nutrition services referral as needed  Outcome: Progressing     Problem: METABOLIC, FLUID AND ELECTROLYTES - ADULT  Goal: Glucose maintained within target range  Description  INTERVENTIONS:  - Monitor Blood Glucose as ordered  - Assess for signs and symptoms of hyperglycemia and hypoglycemia  - Administer ordered medications to maintain glucose within target range  - Assess nutritional intake and initiate nutrition service referral as needed  Outcome: Progressing

## 2019-08-11 NOTE — ASSESSMENT & PLAN NOTE
Patient complaining of increasing bilateral ankle and foot pain today, and this is likely neuropathic, patient did sustain some injury to his feet when he fell at home, will check plain films

## 2019-08-11 NOTE — ASSESSMENT & PLAN NOTE
Patient presents following two falls at home yesterday and today preceded by lightheadedness      Suspected orthostatic hypotension, suspect that telemetry monitor was under sensing the heart rate, cardiology evaluation appreciated    Activity level is slowly improving, recommendation is for short-term rehab discharge, likely discharge tomorrow 8/12/2018    Does have history of multiple hospitalizations due to falls at home   Cardiology input appreciated    Not currently on any beta-blockers  Is on high dose opioid pain medication regimen   Monitor on telemetry   Appreciate Cardiology evaluation  Orthostatics daily     Florinef initiated 8/9/2019 by Cardiology

## 2019-08-11 NOTE — PROGRESS NOTES
Progress Note - Flora Carter 1960, 66 y o  male MRN: [id number]    Unit/Bed#: E4 -01 Encounter: [id number]    Primary Care Provider: Ephraim Simon MD   Date and time admitted to hospital: 8/8/2019  3:45 PM        * Symptomatic bradycardia  Assessment & Plan  Patient presents following two falls at home yesterday and today preceded by lightheadedness  Suspected orthostatic hypotension, suspect that telemetry monitor was under sensing the heart rate, cardiology evaluation appreciated    Activity level is slowly improving, recommendation is for short-term rehab discharge, likely discharge tomorrow 8/12/2018    Does have history of multiple hospitalizations due to falls at home   Cardiology input appreciated    Not currently on any beta-blockers  Is on high dose opioid pain medication regimen   Monitor on telemetry   Appreciate Cardiology evaluation  Orthostatics daily     Florinef initiated 8/9/2019 by Cardiology    Fall at home  Assessment & Plan  Suspected recurrent orthostatic hypotension  Orthostatic VS daily   Continue PT OT  Awaiting rehab placement      Bilateral foot pain  Assessment & Plan  Patient complaining of increasing bilateral ankle and foot pain today, and this is likely neuropathic, patient did sustain some injury to his feet when he fell at home, will check plain films    Stage 3 chronic kidney disease ([facility] [address] )  Assessment & Plan  Renal function at baseline  Avoid hypotension and nephrotoxins       Chronic pain disorder  Assessment & Plan  Reports history of chronic pain disorder , chronic opiate dependence  Continue home regimen of MS Contin 60 MG bid with PRN Oxycodone 30 mg q 6   Up with assistance  PT/OT consults     Chronic diastolic congestive heart failure (HCC)  Assessment & Plan  Wt Readings from Last 3 Encounters:   08/08/19 108 kg (239 lb)   06/25/19 111 kg (244 lb 6 4 oz)   06/12/19 117 kg (259 lb)     Currently euvolemic   Last Echo showing EF of 60% Recently taken off torsemide for hypotension  Cardiology recommended increased fluid and salt intake    Type 2 diabetes mellitus with renal complication [facility])  Assessment & Plan  Lab Results   Component Value Date    HGBA1C 4 9 08/10/2019       Recent Labs     08/10/19  2108 19  0659 19  1135 19  1637   POCGLU 107 86 79 80       Blood Sugar Average: Last 72 hrs:  (P) 919 [id number]     Continue home 35 units QAM as he does at home    SSI coverage with ACCU checks     Diabetic diet     Check hemoglobin A1c    Pressure injury of skin of sacral region  Assessment & Plan  Known sacral decubitus ulcer ,Present on admission 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  Has wound care coming to house every other day   Continue local wound care and frequent turns while inpatient      VTE Pharmacologic Prophylaxis:   Pharmacologic: Heparin  Mechanical VTE Prophylaxis in Place: Yes    Patient Centered Rounds: I have performed bedside rounds with nursing staff today  Time Spent for Care: 30 minutes  More than 50% of total time spent on counseling and coordination of care as described above  Current Length of Stay: 3 day(s)    Current Patient Status: Inpatient   Certification Statement: The patient will continue to require additional inpatient hospital stay due to Ambulatory dysfunction, awaiting short-term rehab placement    Discharge Plan / Estimated Discharge Date:  Plan of discharge 2019 pending rehab bed, bilateral ankle x-rays pending  Code Status: Level 1 - Full Code      Subjective:   Patient complaining of increased bilateral lower extremity pain, similar to his neuropathic pain but with some increased swelling and pain in his bilateral ankles      Objective:     Vitals:   Temp (24hrs), Av 5 °F (36 4 °C), Min:97 2 °F (36 2 °C), Max:97 7 °F (36 5 °C)    Temp:  [97 2 °F (36 2 °C)-97 7 °F (36 5 °C)] 97 6 °F (36 4 °C)  HR:  [54-69] 54  Resp:  [18-20] 18  BP: ()/(53-61) 99/53  SpO2:  [94 %-97 %] 95 %  Body mass index is 32 41 kg/m²  Input and Output Summary (last 24 hours): Intake/Output Summary (Last 24 hours) at 8/11/2019 1723  Last data filed at 8/11/2019 1001  Gross per 24 hour   Intake 400 ml   Output 800 ml   Net -400 ml       Physical Exam:     Physical Exam   Constitutional: He appears well-developed and well-nourished  No distress  HENT:   Head: Normocephalic and atraumatic  Right Ear: External ear normal    Left Ear: External ear normal    Pulmonary/Chest: Effort normal and breath sounds normal  No stridor  No respiratory distress  Abdominal: Soft  Bowel sounds are normal  He exhibits no distension  There is no tenderness  Musculoskeletal: He exhibits edema and tenderness  Patient with ecchymotic left 2nd toe    Patient with increased edema and right ankle, no ecchymosis of the right ankle, mild tenderness to palpation   Skin: He is not diaphoretic  Nursing note and vitals reviewed  Additional Data:     Labs:    Results from last 7 days   Lab Units 08/10/19  0541   WBC Thousand/uL 9 12   HEMOGLOBIN g/dL 11 3*   HEMATOCRIT % 37 1   PLATELETS Thousands/uL 179   NEUTROS PCT % 69   LYMPHS PCT % 18   MONOS PCT % 7   EOS PCT % 5     Results from last 7 days   Lab Units 08/10/19  0541   POTASSIUM mmol/L 4 0   CHLORIDE mmol/L 109*   CO2 mmol/L 27   BUN mg/dL 9   CREATININE mg/dL 0 89   CALCIUM mg/dL 8 3   ALK PHOS U/L 93   ALT U/L 9*   AST U/L 10     Results from last 7 days   Lab Units 08/10/19  0541   INR  1 23*       * I Have Reviewed All Lab Data Listed Above  * Additional Pertinent Lab Tests Reviewed:  [facility]          Recent Cultures (last 7 days):           Last 24 Hours Medication List:     Current Facility-Administered Medications:  acetaminophen 650 mg Oral Q6H PRN Amy Smudde, PA-C   albuterol 2 puff Inhalation Q6H PRN Amy Smudde, PA-C   albuterol 1 25 mg Nebulization Q6H PRN Amy Smudde, PA-THIAGO   aluminum-magnesium hydroxide-simethicone 30 mL Oral Q6H PRN Ney Westfall PA-C   aspirin 81 mg Oral Daily Amy Xu, JANNA   atorvastatin 40 mg Oral Daily With [employer], PA-THIAGO   baclofen 10 mg Oral TID Aidan Mcnair, JANNA   calcium carbonate-vitamin D 2 tablet Oral Daily With Breakfast Amy Mcnair, JANNA   vitamin B-12 500 mcg Oral Daily Amy Smudde, PA-C   fludrocortisone 0 1 mg Oral Daily Catalina Gr MD   fluticasone-vilanterol 1 puff Inhalation Daily Aym Xu, JANNA   folic acid 1,723 mcg Oral Daily Amy Smudde, PA-THIAGO   gabapentin 800 mg Oral BID Amy Smudde, PA-THIAGO   heparin (porcine) 5,000 Units Subcutaneous Q8H [address] Amy Smudde, JANNA   hydrOXYzine HCL 25 mg Oral Q6H PRN Edinson Shirley, DO   insulin glargine 35 Units Subcutaneous QAM Amy Smudde, PA-THIAGO   insulin lispro 1-5 Units Subcutaneous HS Amy Smudde, PA-THIAGO   insulin lispro 1-6 Units Subcutaneous TID AC Amy Xu, JANNA   lubiprostone 24 mcg Oral BID With Meals Ney Westfall PA-C   morphine 60 mg Oral Q12H [address] Amy Smken, JANNA   multivitamin-minerals 1 tablet Oral Daily Amy Smken, JANNA   ondansetron 4 mg Intravenous Q6H PRN Amy Smudde, PA-THIAGO   oxyCODONE 30 mg Oral Q6H PRN Amy Smudde, PA-THIAGO   pantoprazole 40 mg Oral Early Morning Amy Smudde, PA-C   polyethylene glycol 17 g Oral Daily PRN Amy Smudde, PA-THIAGO   potassium chloride 40 mEq Oral Daily Amy Smudde, PA-C   prochlorperazine 5 mg Oral Q6H PRN Edinson Zhengl, DO   traZODone 100 mg Oral HS Amy Smudde, PA-C   zolpidem 10 mg Oral HS PRN Ney Westfall PA-C        Today, Patient Was Seen By: Edinson Shirley DO

## 2019-08-11 NOTE — ASSESSMENT & PLAN NOTE
Lab Results   Component Value Date    HGBA1C 4 9 08/10/2019       Recent Labs     08/10/19  2108 08/11/19  0659 08/11/19  1135 08/11/19  1637   POCGLU 107 86 79 80       Blood Sugar Average: Last 72 hrs:  (P) 499 [id number]     Continue home 35 units QAM as he does at home    SSI coverage with ACCU checks     Diabetic diet     Check hemoglobin A1c

## 2019-08-12 PROBLEM — I95.1 ORTHOSTATIC HYPOTENSION: Status: ACTIVE | Noted: 2019-08-12

## 2019-08-12 LAB
GLUCOSE SERPL-MCNC: 104 MG/DL (ref 65–140)
GLUCOSE SERPL-MCNC: 117 MG/DL (ref 65–140)
GLUCOSE SERPL-MCNC: 80 MG/DL (ref 65–140)
GLUCOSE SERPL-MCNC: 90 MG/DL (ref 65–140)

## 2019-08-12 PROCEDURE — 82948 REAGENT STRIP/BLOOD GLUCOSE: CPT

## 2019-08-12 PROCEDURE — 99232 SBSQ HOSP IP/OBS MODERATE 35: CPT | Performed by: INTERNAL MEDICINE

## 2019-08-12 PROCEDURE — 93306 TTE W/DOPPLER COMPLETE: CPT | Performed by: INTERNAL MEDICINE

## 2019-08-12 RX ORDER — COLCHICINE 0.6 MG/1
0.6 TABLET ORAL 2 TIMES DAILY
Status: DISCONTINUED | OUTPATIENT
Start: 2019-08-12 | End: 2019-08-13 | Stop reason: HOSPADM

## 2019-08-12 RX ADMIN — COLCHICINE 0.6 MG: 0.6 TABLET, FILM COATED ORAL at 12:04

## 2019-08-12 RX ADMIN — FOLIC ACID 1000 MCG: 1 TABLET ORAL at 09:00

## 2019-08-12 RX ADMIN — INSULIN GLARGINE 35 UNITS: 100 INJECTION, SOLUTION SUBCUTANEOUS at 08:58

## 2019-08-12 RX ADMIN — Medication 1 TABLET: at 09:00

## 2019-08-12 RX ADMIN — BACLOFEN 10 MG: 10 TABLET ORAL at 17:17

## 2019-08-12 RX ADMIN — FLUTICASONE FUROATE AND VILANTEROL TRIFENATATE 1 PUFF: 200; 25 POWDER RESPIRATORY (INHALATION) at 08:59

## 2019-08-12 RX ADMIN — OXYCODONE HYDROCHLORIDE 30 MG: 10 TABLET ORAL at 15:58

## 2019-08-12 RX ADMIN — COLCHICINE 0.6 MG: 0.6 TABLET, FILM COATED ORAL at 17:17

## 2019-08-12 RX ADMIN — HEPARIN SODIUM 5000 UNITS: 5000 INJECTION INTRAVENOUS; SUBCUTANEOUS at 13:30

## 2019-08-12 RX ADMIN — ASPIRIN 81 MG: 81 TABLET, COATED ORAL at 08:59

## 2019-08-12 RX ADMIN — GABAPENTIN 800 MG: 400 CAPSULE ORAL at 17:18

## 2019-08-12 RX ADMIN — CYANOCOBALAMIN TAB 500 MCG 500 MCG: 500 TAB at 09:00

## 2019-08-12 RX ADMIN — MORPHINE SULFATE 60 MG: 30 TABLET, EXTENDED RELEASE ORAL at 21:56

## 2019-08-12 RX ADMIN — POTASSIUM CHLORIDE 40 MEQ: 1500 TABLET, EXTENDED RELEASE ORAL at 08:59

## 2019-08-12 RX ADMIN — GABAPENTIN 800 MG: 400 CAPSULE ORAL at 08:59

## 2019-08-12 RX ADMIN — CALCIUM CARBONATE 500 MG (1,250 MG)-VITAMIN D3 200 UNIT TABLET 2 TABLET: at 09:00

## 2019-08-12 RX ADMIN — PANTOPRAZOLE SODIUM 40 MG: 40 TABLET, DELAYED RELEASE ORAL at 05:07

## 2019-08-12 RX ADMIN — LUBIPROSTONE 24 MCG: 24 CAPSULE, GELATIN COATED ORAL at 08:58

## 2019-08-12 RX ADMIN — OXYCODONE HYDROCHLORIDE 30 MG: 10 TABLET ORAL at 21:56

## 2019-08-12 RX ADMIN — TRAZODONE HYDROCHLORIDE 100 MG: 100 TABLET ORAL at 21:56

## 2019-08-12 RX ADMIN — BACLOFEN 10 MG: 10 TABLET ORAL at 21:56

## 2019-08-12 RX ADMIN — HEPARIN SODIUM 5000 UNITS: 5000 INJECTION INTRAVENOUS; SUBCUTANEOUS at 21:56

## 2019-08-12 RX ADMIN — BACLOFEN 10 MG: 10 TABLET ORAL at 09:00

## 2019-08-12 RX ADMIN — FLUDROCORTISONE ACETATE 0.1 MG: 0.1 TABLET ORAL at 08:58

## 2019-08-12 RX ADMIN — HEPARIN SODIUM 5000 UNITS: 5000 INJECTION INTRAVENOUS; SUBCUTANEOUS at 05:08

## 2019-08-12 RX ADMIN — OXYCODONE HYDROCHLORIDE 30 MG: 10 TABLET ORAL at 09:08

## 2019-08-12 RX ADMIN — ATORVASTATIN CALCIUM 40 MG: 40 TABLET, FILM COATED ORAL at 17:17

## 2019-08-12 RX ADMIN — MORPHINE SULFATE 60 MG: 30 TABLET, EXTENDED RELEASE ORAL at 08:59

## 2019-08-12 NOTE — PROGRESS NOTES
Progress Note - Feli Nino 1960, 66 y o  male MRN: [id number]    Unit/Bed#: E4 -01 Encounter: [id number]    Primary Care Provider: Wendy Hannon MD   Date and time admitted to hospital: 8/8/2019  3:45 PM        * Orthostatic hypotension  Assessment & Plan  Fall at home appears secondary to orthostatic hypotension  Fludrocortisone added by cardiology with improvement  Patient agreeable to SNF    Symptomatic bradycardia  Assessment & Plan  Admission for possible symptomatic bradycardia but seen by Cardiology and thought telemetry was not sensing heart rate  Telemetry has been discontinued without further issues    Bilateral foot pain  Assessment & Plan  Bilateral feet pain  Plain films without fractures  Consideration for gouty arthritis  Will trial colchicine  Stage 3 chronic kidney disease ([facility] [address] )  Assessment & Plan  CKD 3 at baseline    Results from last 7 days   Lab Units 08/10/19  0541 08/09/19  0529 08/08/19  1626   BUN mg/dL 9 7 8   CREATININE mg/dL 0 89 0 84 1 05   EGFR ml/min/1 73sq m 96 98 79       Chronic pain disorder  Assessment & Plan  Chronic pain on MS Contin, oxycodone, and morphine pump    Chronic diastolic congestive heart failure (HCC)  Assessment & Plan  Wt Readings from Last 3 Encounters:   08/08/19 108 kg (239 lb)   06/25/19 111 kg (244 lb 6 4 oz)   06/12/19 117 kg (259 lb)     Chronic diastolic CHF remains euvolemic  Torsemide discontinued due to hypotension  Cardiology recommending increased fluid and salt intake       Type 2 diabetes mellitus with renal complication [facility])  Assessment & Plan  Lab Results   Component Value Date    HGBA1C 4 9 08/10/2019     Recent Labs     08/11/19  1637 08/11/19  2056 08/12/19  0709 08/12/19  1116   POCGLU 80 158* 80 90     Glucose stable on glargine 35 units in a m  and sliding scale      VTE Pharmacologic Prophylaxis: Heparin    Patient Centered Rounds: I have performed bedside rounds with nursing staff today   Discussions with Specialists or Other Care Team Provider:   Education and Discussions with Family / Patient:     Time Spent for Care: 25 mins  More than 50% of total time spent on counseling and coordination of care as described above  Current Length of Stay: 4 day(s)  Current Patient Status: Inpatient     Certification Statement: The patient will continue to require additional inpatient hospital stay due to Symptomatic bradycardia  Discharge Plan / Estimated Discharge Date:  Awaiting rehab placement    Code Status: Level 1 - Full Code  ______________________________________________________________________________    Subjective:   Patient seen and examined  Still having bilateral ankle pains  No chest pain or shortness of breath  Objective:   Vitals: Blood pressure 102/53, pulse 61, temperature (!) 97 4 °F (36 3 °C), temperature source Tympanic, resp  rate 18, height 6' (1 829 m), weight 108 kg (239 lb), SpO2 96 %      Physical Exam:   General appearance: alert, appears stated age and cooperative  Head: Normocephalic, without obvious abnormality, atraumatic  Lungs: clear to auscultation bilaterally  Heart: regular rate and rhythm  Abdomen: soft, non-tender, positive bowel sounds   Back: negative  Extremities: Bilateral ankle tenderness to range of motion and palpation  Neurologic: Grossly normal    Additional Data:   Labs:  Results from last 7 days   Lab Units 08/10/19  0541 08/09/19  0529 08/08/19  1626   WBC Thousand/uL 9 12 8 47 11 23*   HEMOGLOBIN g/dL 11 3* 11 4* 12 6   HEMATOCRIT % 37 1 37 2 41 4   MCV fL 93 94 94   PLATELETS Thousands/uL 179 155 214   INR  1 23*  --   --      Results from last 7 days   Lab Units 08/10/19  0541 08/09/19  0529 08/08/19  1626   SODIUM mmol/L 142 143 142   POTASSIUM mmol/L 4 0 4 3 4 8   CHLORIDE mmol/L 109* 110* 108   CO2 mmol/L 27 24 28   ANION GAP mmol/L 6 9 6   BUN mg/dL 9 7 8   CREATININE mg/dL 0 89 0 84 1 05   CALCIUM mg/dL 8 3 8 4 8 8   ALBUMIN g/dL 2 4*  -- 2 8*   TOTAL BILIRUBIN mg/dL 0 30  --  0 40   ALK PHOS U/L 93  --  107   ALT U/L 9*  --  17   AST U/L 10  --  13   EGFR ml/min/1 73sq m 96 98 79   GLUCOSE RANDOM mg/dL 79 73 94     Results from last 7 days   Lab Units 08/10/19  0541 08/09/19  0529 08/08/19  1626   MAGNESIUM mg/dL 2 0 2 1 2 0   PHOSPHORUS mg/dL 4 0  --   --          Results from last 7 days   Lab Units 08/08/19  1626   NT-PRO BNP pg/mL 316*          Results from last 7 days   Lab Units 08/12/19  0709 08/11/19  2056 08/11/19  1637 08/11/19  1135 08/11/19  0659 08/10/19  2108 08/10/19  1553 08/10/19  1120 08/10/19  0704 08/09/19  2055 08/09/19  1550 08/09/19  1137   POC GLUCOSE mg/dl 80 158* 80 79 86 107 148* 105 84 117 98 166*     Results from last 7 days   Lab Units 08/10/19  0541   HEMOGLOBIN A1C % 4 9     Results from last 7 days   Lab Units 08/08/19  1626   TSH 3RD GENERATON uIU/mL 1 875     * I Have Reviewed All Lab Data Listed Above  Cultures:           Imaging:  Imaging Reports Reviewed Today Include:   Procedure: Xr Ankle 3+ Vw Left  Result Date: 8/12/2019  Impression: No acute osseous abnormality  Workstation performed: [id number]     Procedure: Xr Ankle 3+ Vw Right  Result Date: 8/12/2019  Impression: No acute osseous abnormality   Workstation performed: [id number]     Scheduled Meds:  Current Facility-Administered Medications:  acetaminophen 650 mg Oral Q6H PRN Amy Smudde, PA-C   albuterol 2 puff Inhalation Q6H PRN Amy Smudde, PA-C   albuterol 1 25 mg Nebulization Q6H PRN Amy Smudde, PA-C   aluminum-magnesium hydroxide-simethicone 30 mL Oral Q6H PRN Amy Smudde, PA-C   aspirin 81 mg Oral Daily Amy Smudde, PA-C   atorvastatin 40 mg Oral Daily With THIAGO Barkley, PA-C   baclofen 10 mg Oral TID Tank Mcnair PA-C   calcium carbonate-vitamin D 2 tablet Oral Daily With Breakfast Amy Mcnair PA-C   vitamin B-12 500 mcg Oral Daily Amy Mcnair PA-C   fludrocortisone 0 1 mg Oral Daily Cheryl Joyner MD fluticasone-vilanterol 1 puff Inhalation Daily Amy Smudde, PA-C   folic acid 2,490 mcg Oral Daily Amy Smudde, PA-C   gabapentin 800 mg Oral BID Amy Smudde, PA-C   heparin (porcine) 5,000 Units Subcutaneous Q8H [address] Amy Smudde, PA-C   hydrOXYzine HCL 25 mg Oral Q6H PRN Amando Soto,    insulin glargine 35 Units Subcutaneous QAM Amy Smudde, PA-C   insulin lispro 1-5 Units Subcutaneous HS Amy Smudde, PA-C   insulin lispro 1-6 Units Subcutaneous TID AC Amy Smudde, PA-C   lubiprostone 24 mcg Oral BID With [employer], JANNA   morphine 60 mg Oral Q12H [address] Amy Smudde, PA-C   multivitamin-minerals 1 tablet Oral Daily Amy Smudde, PA-C   ondansetron 4 mg Intravenous Q6H PRN Amy Smudde, PA-C   oxyCODONE 30 mg Oral Q6H PRN Amy Smudde, PA-C   pantoprazole 40 mg Oral Early Morning Amy Smudde, PA-C   polyethylene glycol 17 g Oral Daily PRN Amy Smudde, PA-C   potassium chloride 40 mEq Oral Daily Amy Smudde, PA-C   prochlorperazine 5 mg Oral Q6H PRN Amando Soto DO   traZODone 100 mg Oral HS Amy Smudde, PA-C   zolpidem 10 mg Oral HS PRN [employer], JANNA Campbell DO  [facility]  Hospitalist    ** Please Note: This note has been constructed using a voice recognition system   **

## 2019-08-12 NOTE — ASSESSMENT & PLAN NOTE
Wt Readings from Last 3 Encounters:   08/08/19 108 kg (239 lb)   06/25/19 111 kg (244 lb 6 4 oz)   06/12/19 117 kg (259 lb)     Chronic diastolic CHF remains euvolemic  Torsemide discontinued due to hypotension  Cardiology recommending increased fluid and salt intake

## 2019-08-12 NOTE — PLAN OF CARE
Problem: Potential for Falls  Goal: Patient will remain free of falls  Description  INTERVENTIONS:  - Assess patient frequently for physical needs  -  Identify cognitive and physical deficits and behaviors that affect risk of falls  -  [place] fall precautions as indicated by assessment   - Educate patient/family on patient safety including physical limitations  - Instruct patient to call for assistance with activity based on assessment  - Modify environment to reduce risk of injury  - Consider OT/PT consult to assist with strengthening/mobility  Outcome: Progressing     Problem: Prexisting or High Potential for Compromised Skin Integrity  Goal: Skin integrity is maintained or improved  Description  INTERVENTIONS:  - Identify patients at risk for skin breakdown  - Assess and monitor skin integrity  - Assess and monitor nutrition and hydration status  - Monitor labs (i e  albumin)  - Assess for incontinence   - Turn and reposition patient  - Assist with mobility/ambulation  - Relieve pressure over bony prominences  - Avoid friction and shearing  - Provide appropriate hygiene as needed including keeping skin clean and dry  - Evaluate need for skin moisturizer/barrier cream  - Collaborate with interdisciplinary team (i e  Nutrition, Rehabilitation, etc )   - Patient/family teaching  Outcome: Progressing     Problem: Nutrition/Hydration-ADULT  Goal: Nutrient/Hydration intake appropriate for improving, restoring or maintaining nutritional needs  Description  Monitor and assess patient's nutrition/hydration status for malnutrition (ex- brittle hair, bruises, dry skin, pale skin and conjunctiva, muscle wasting, smooth red tongue, and disorientation)  Collaborate with interdisciplinary team and initiate plan and interventions as ordered  Monitor patient's weight and dietary intake as ordered or per policy  Utilize nutrition screening tool and intervene per policy   Determine patient's food preferences and provide high-protein, high-caloric foods as appropriate       INTERVENTIONS:  - Monitor oral intake, urinary output, labs, and treatment plans  - Assess nutrition and hydration status and recommend course of action  - Evaluate amount of meals eaten  - Assist patient with eating if necessary   - Allow adequate time for meals  - Recommend/ encourage appropriate diets, oral nutritional supplements, and vitamin/mineral supplements  - Order, calculate, and assess calorie counts as needed  - Recommend, monitor, and adjust tube feedings and TPN/PPN based on assessed needs  - Assess need for intravenous fluids  - Provide specific nutrition/hydration education as appropriate  - Include patient/family/caregiver in decisions related to nutrition  Outcome: Progressing     Problem: PAIN - ADULT  Goal: Verbalizes/displays adequate comfort level or baseline comfort level  Description  Interventions:  - Encourage patient to monitor pain and request assistance  - Assess pain using appropriate pain scale  - Administer analgesics based on type and severity of pain and evaluate response  - Implement non-pharmacological measures as appropriate and evaluate response  - Consider cultural and social influences on pain and pain management  - Notify physician/advanced practitioner if interventions unsuccessful or patient reports new pain  Outcome: Progressing     Problem: INFECTION - ADULT  Goal: Absence or prevention of progression during hospitalization  Description  INTERVENTIONS:  - Assess and monitor for signs and symptoms of infection  - Monitor lab/diagnostic results  - Monitor all insertion sites, i e  indwelling lines, tubes, and drains  - Monitor endotracheal (as able) and nasal secretions for changes in amount and color  - [place] appropriate cooling/warming therapies per order  - Administer medications as ordered  - Instruct and encourage patient and family to use good hand hygiene technique  - Identify and instruct in appropriate isolation precautions for identified infection/condition  Outcome: Progressing     Problem: SAFETY ADULT  Goal: Patient will remain free of falls  Description  INTERVENTIONS:  - Assess patient frequently for physical needs  -  Identify cognitive and physical deficits and behaviors that affect risk of falls    -  [place] fall precautions as indicated by assessment   - Educate patient/family on patient safety including physical limitations  - Instruct patient to call for assistance with activity based on assessment  - Modify environment to reduce risk of injury  - Consider OT/PT consult to assist with strengthening/mobility  Outcome: Progressing  Goal: Maintain or return to baseline ADL function  Description  INTERVENTIONS:  -  Assess patient's ability to carry out ADLs; assess patient's baseline for ADL function and identify physical deficits which impact ability to perform ADLs (bathing, care of mouth/teeth, toileting, grooming, dressing, etc )  - Assess/evaluate cause of self-care deficits   - Assess range of motion  - Assess patient's mobility; develop plan if impaired  - Assess patient's need for assistive devices and provide as appropriate  - Encourage maximum independence but intervene and supervise when necessary  ¯ Involve family in performance of ADLs  ¯ Assess for home care needs following discharge   ¯ Request OT consult to assist with ADL evaluation and planning for discharge  ¯ Provide patient education as appropriate  Outcome: Progressing  Goal: Maintain or return mobility status to optimal level  Description  INTERVENTIONS:  - Assess patient's baseline mobility status (ambulation, transfers, stairs, etc )    - Identify cognitive and physical deficits and behaviors that affect mobility  - Identify mobility aids required to assist with transfers and/or ambulation (gait belt, sit-to-stand, lift, walker, cane, etc )  - [place] fall precautions as indicated by assessment  - Record patient progress and toleration of activity level on Mobility SBAR; progress patient to next Phase/Stage  - Instruct patient to call for assistance with activity based on assessment  - Request Rehabilitation consult to assist with strengthening/weightbearing, etc   Outcome: Progressing     Problem: DISCHARGE PLANNING  Goal: Discharge to home or other facility with appropriate resources  Description  INTERVENTIONS:  - Identify barriers to discharge w/patient and caregiver  - Arrange for needed discharge resources and transportation as appropriate  - Identify discharge learning needs (meds, wound care, etc )  - Arrange for interpretive services to assist at discharge as needed  - Refer to Case Management Department for coordinating discharge planning if the patient needs post-hospital services based on physician/advanced practitioner order or complex needs related to functional status, cognitive ability, or social support system  Outcome: Progressing     Problem: Knowledge Deficit  Goal: Patient/family/caregiver demonstrates understanding of disease process, treatment plan, medications, and discharge instructions  Description  Complete learning assessment and assess knowledge base  Interventions:  - Provide teaching at level of understanding  - Provide teaching via preferred learning methods  Outcome: Progressing     Problem: CARDIOVASCULAR - ADULT  Goal: Maintains optimal cardiac output and hemodynamic stability  Description  INTERVENTIONS:  - Monitor I/O, vital signs and rhythm  - Monitor for S/S and trends of decreased cardiac output i e  bleeding, hypotension  - Administer and titrate ordered vasoactive medications to optimize hemodynamic stability  - Assess quality of pulses, skin color and temperature  - Assess for signs of decreased coronary artery perfusion - ex   Angina  - Instruct patient to report change in severity of symptoms  Outcome: Progressing  Goal: Absence of cardiac dysrhythmias or at baseline rhythm  Description  INTERVENTIONS:  - Continuous cardiac monitoring, monitor vital signs, obtain 12 lead EKG if indicated  - Administer antiarrhythmic and heart rate control medications as ordered  - Monitor electrolytes and administer replacement therapy as ordered  Outcome: Progressing     Problem: SKIN/TISSUE INTEGRITY - ADULT  Goal: Skin integrity remains intact  Description  INTERVENTIONS  - Identify patients at risk for skin breakdown  - Assess and monitor skin integrity  - Assess and monitor nutrition and hydration status  - Monitor labs (i e  albumin)  - Assess for incontinence   - Turn and reposition patient  - Assist with mobility/ambulation  - Relieve pressure over bony prominences  - Avoid friction and shearing  - Provide appropriate hygiene as needed including keeping skin clean and dry  - Evaluate need for skin moisturizer/barrier cream  - Collaborate with interdisciplinary team (i e  Nutrition, Rehabilitation, etc )   - Patient/family teaching  Outcome: Progressing  Goal: Incision(s), wounds(s) or drain site(s) healing without S/S of infection  Description  INTERVENTIONS  - Assess and document risk factors for skin impairment   - Assess and document dressing, incision, wound bed, drain sites and surrounding tissue  - Initiate Nutrition services consult and/or wound management as needed  Outcome: Progressing  Goal: Oral mucous membranes remain intact  Description  INTERVENTIONS  - Assess oral mucosa and hygiene practices  - Implement preventative oral hygiene regimen  - Implement oral medicated treatments as ordered  - Initiate Nutrition services referral as needed  Outcome: Progressing     Problem: METABOLIC, FLUID AND ELECTROLYTES - ADULT  Goal: Glucose maintained within target range  Description  INTERVENTIONS:  - Monitor Blood Glucose as ordered  - Assess for signs and symptoms of hyperglycemia and hypoglycemia  - Administer ordered medications to maintain glucose within target range  - Assess nutritional intake and initiate nutrition service referral as needed  Outcome: Progressing

## 2019-08-12 NOTE — SOCIAL WORK
Patient refused to go to [facility] states that wife would not be able to visit him  PT request to choose a place closer to address  A post acute care recommendation was made by your care team for STR  Discussed Freedom of Choice with patient  List of agencies given to patient via in person  patient aware the list is custom filtered for them by zip code location and that [facility] post acute providers are designated  Pt request to sent referral to [facility] [place] and [facility] and awaiting response from [employer]  Cm will f/u    [facility] is accepting but will not have a bed until tomorrow  MD and pt made aware  CM will f/u

## 2019-08-12 NOTE — PLAN OF CARE
Problem: Potential for Falls  Goal: Patient will remain free of falls  Description  INTERVENTIONS:  - Assess patient frequently for physical needs  -  Identify cognitive and physical deficits and behaviors that affect risk of falls  -  [place] fall precautions as indicated by assessment   - Educate patient/family on patient safety including physical limitations  - Instruct patient to call for assistance with activity based on assessment  - Modify environment to reduce risk of injury  - Consider OT/PT consult to assist with strengthening/mobility  Outcome: Progressing     Problem: Prexisting or High Potential for Compromised Skin Integrity  Goal: Skin integrity is maintained or improved  Description  INTERVENTIONS:  - Identify patients at risk for skin breakdown  - Assess and monitor skin integrity  - Assess and monitor nutrition and hydration status  - Monitor labs (i e  albumin)  - Assess for incontinence   - Turn and reposition patient  - Assist with mobility/ambulation  - Relieve pressure over bony prominences  - Avoid friction and shearing  - Provide appropriate hygiene as needed including keeping skin clean and dry  - Evaluate need for skin moisturizer/barrier cream  - Collaborate with interdisciplinary team (i e  Nutrition, Rehabilitation, etc )   - Patient/family teaching  Outcome: Progressing     Problem: Nutrition/Hydration-ADULT  Goal: Nutrient/Hydration intake appropriate for improving, restoring or maintaining nutritional needs  Description  Monitor and assess patient's nutrition/hydration status for malnutrition (ex- brittle hair, bruises, dry skin, pale skin and conjunctiva, muscle wasting, smooth red tongue, and disorientation)  Collaborate with interdisciplinary team and initiate plan and interventions as ordered  Monitor patient's weight and dietary intake as ordered or per policy  Utilize nutrition screening tool and intervene per policy   Determine patient's food preferences and provide high-protein, high-caloric foods as appropriate       INTERVENTIONS:  - Monitor oral intake, urinary output, labs, and treatment plans  - Assess nutrition and hydration status and recommend course of action  - Evaluate amount of meals eaten  - Assist patient with eating if necessary   - Allow adequate time for meals  - Recommend/ encourage appropriate diets, oral nutritional supplements, and vitamin/mineral supplements  - Order, calculate, and assess calorie counts as needed  - Recommend, monitor, and adjust tube feedings and TPN/PPN based on assessed needs  - Assess need for intravenous fluids  - Provide specific nutrition/hydration education as appropriate  - Include patient/family/caregiver in decisions related to nutrition  Outcome: Progressing     Problem: PAIN - ADULT  Goal: Verbalizes/displays adequate comfort level or baseline comfort level  Description  Interventions:  - Encourage patient to monitor pain and request assistance  - Assess pain using appropriate pain scale  - Administer analgesics based on type and severity of pain and evaluate response  - Implement non-pharmacological measures as appropriate and evaluate response  - Consider cultural and social influences on pain and pain management  - Notify physician/advanced practitioner if interventions unsuccessful or patient reports new pain  Outcome: Progressing     Problem: INFECTION - ADULT  Goal: Absence or prevention of progression during hospitalization  Description  INTERVENTIONS:  - Assess and monitor for signs and symptoms of infection  - Monitor lab/diagnostic results  - Monitor all insertion sites, i e  indwelling lines, tubes, and drains  - Monitor endotracheal (as able) and nasal secretions for changes in amount and color  - [place] appropriate cooling/warming therapies per order  - Administer medications as ordered  - Instruct and encourage patient and family to use good hand hygiene technique  - Identify and instruct in appropriate isolation precautions for identified infection/condition  Outcome: Progressing     Problem: SAFETY ADULT  Goal: Patient will remain free of falls  Description  INTERVENTIONS:  - Assess patient frequently for physical needs  -  Identify cognitive and physical deficits and behaviors that affect risk of falls    -  [place] fall precautions as indicated by assessment   - Educate patient/family on patient safety including physical limitations  - Instruct patient to call for assistance with activity based on assessment  - Modify environment to reduce risk of injury  - Consider OT/PT consult to assist with strengthening/mobility  Outcome: Progressing  Goal: Maintain or return to baseline ADL function  Description  INTERVENTIONS:  -  Assess patient's ability to carry out ADLs; assess patient's baseline for ADL function and identify physical deficits which impact ability to perform ADLs (bathing, care of mouth/teeth, toileting, grooming, dressing, etc )  - Assess/evaluate cause of self-care deficits   - Assess range of motion  - Assess patient's mobility; develop plan if impaired  - Assess patient's need for assistive devices and provide as appropriate  - Encourage maximum independence but intervene and supervise when necessary  ¯ Involve family in performance of ADLs  ¯ Assess for home care needs following discharge   ¯ Request OT consult to assist with ADL evaluation and planning for discharge  ¯ Provide patient education as appropriate  Outcome: Progressing  Goal: Maintain or return mobility status to optimal level  Description  INTERVENTIONS:  - Assess patient's baseline mobility status (ambulation, transfers, stairs, etc )    - Identify cognitive and physical deficits and behaviors that affect mobility  - Identify mobility aids required to assist with transfers and/or ambulation (gait belt, sit-to-stand, lift, walker, cane, etc )  - [place] fall precautions as indicated by assessment  - Record patient progress and toleration of activity level on Mobility SBAR; progress patient to next Phase/Stage  - Instruct patient to call for assistance with activity based on assessment  - Request Rehabilitation consult to assist with strengthening/weightbearing, etc   Outcome: Progressing     Problem: DISCHARGE PLANNING  Goal: Discharge to home or other facility with appropriate resources  Description  INTERVENTIONS:  - Identify barriers to discharge w/patient and caregiver  - Arrange for needed discharge resources and transportation as appropriate  - Identify discharge learning needs (meds, wound care, etc )  - Arrange for interpretive services to assist at discharge as needed  - Refer to Case Management Department for coordinating discharge planning if the patient needs post-hospital services based on physician/advanced practitioner order or complex needs related to functional status, cognitive ability, or social support system  Outcome: Progressing     Problem: Knowledge Deficit  Goal: Patient/family/caregiver demonstrates understanding of disease process, treatment plan, medications, and discharge instructions  Description  Complete learning assessment and assess knowledge base  Interventions:  - Provide teaching at level of understanding  - Provide teaching via preferred learning methods  Outcome: Progressing     Problem: CARDIOVASCULAR - ADULT  Goal: Maintains optimal cardiac output and hemodynamic stability  Description  INTERVENTIONS:  - Monitor I/O, vital signs and rhythm  - Monitor for S/S and trends of decreased cardiac output i e  bleeding, hypotension  - Administer and titrate ordered vasoactive medications to optimize hemodynamic stability  - Assess quality of pulses, skin color and temperature  - Assess for signs of decreased coronary artery perfusion - ex   Angina  - Instruct patient to report change in severity of symptoms  Outcome: Progressing  Goal: Absence of cardiac dysrhythmias or at baseline rhythm  Description  INTERVENTIONS:  - Continuous cardiac monitoring, monitor vital signs, obtain 12 lead EKG if indicated  - Administer antiarrhythmic and heart rate control medications as ordered  - Monitor electrolytes and administer replacement therapy as ordered  Outcome: Progressing     Problem: SKIN/TISSUE INTEGRITY - ADULT  Goal: Skin integrity remains intact  Description  INTERVENTIONS  - Identify patients at risk for skin breakdown  - Assess and monitor skin integrity  - Assess and monitor nutrition and hydration status  - Monitor labs (i e  albumin)  - Assess for incontinence   - Turn and reposition patient  - Assist with mobility/ambulation  - Relieve pressure over bony prominences  - Avoid friction and shearing  - Provide appropriate hygiene as needed including keeping skin clean and dry  - Evaluate need for skin moisturizer/barrier cream  - Collaborate with interdisciplinary team (i e  Nutrition, Rehabilitation, etc )   - Patient/family teaching  Outcome: Progressing  Goal: Incision(s), wounds(s) or drain site(s) healing without S/S of infection  Description  INTERVENTIONS  - Assess and document risk factors for skin impairment   - Assess and document dressing, incision, wound bed, drain sites and surrounding tissue  - Initiate Nutrition services consult and/or wound management as needed  Outcome: Progressing  Goal: Oral mucous membranes remain intact  Description  INTERVENTIONS  - Assess oral mucosa and hygiene practices  - Implement preventative oral hygiene regimen  - Implement oral medicated treatments as ordered  - Initiate Nutrition services referral as needed  Outcome: Progressing     Problem: METABOLIC, FLUID AND ELECTROLYTES - ADULT  Goal: Glucose maintained within target range  Description  INTERVENTIONS:  - Monitor Blood Glucose as ordered  - Assess for signs and symptoms of hyperglycemia and hypoglycemia  - Administer ordered medications to maintain glucose within target range  - Assess nutritional intake and initiate nutrition service referral as needed  Outcome: Progressing

## 2019-08-12 NOTE — ASSESSMENT & PLAN NOTE
Bilateral feet pain  Plain films without fractures  Consideration for gouty arthritis  Will trial colchicine

## 2019-08-12 NOTE — PROGRESS NOTES
Spoke with the patient this morning to discuss rehab options with the patient for when he is medically cleared for discharge  Patient at this time prefers to be in a facliity where there is a hospital on site, or a physician that is on site for most of the day  Patient also has concerns regarding location as he states his wife is unable to drive so he wants to make sure that the location is convenient for his wife as well  Patients preferences for rehab at this time are 1st choice: [facility], 2nd choice: [address] [place] [address], and 3rd choice is [facility]  CM made aware

## 2019-08-12 NOTE — ASSESSMENT & PLAN NOTE
CKD 3 at baseline    Results from last 7 days   Lab Units 08/10/19  0541 08/09/19  0529 08/08/19  1626   BUN mg/dL 9 7 8   CREATININE mg/dL 0 89 0 84 1 05   EGFR ml/min/1 73sq m 96 98 79

## 2019-08-12 NOTE — ASSESSMENT & PLAN NOTE
Admission for possible symptomatic bradycardia but seen by Cardiology and thought telemetry was not sensing heart rate    Telemetry has been discontinued without further issues

## 2019-08-12 NOTE — SOCIAL WORK
CM received choices from Pt for STR facilities: 1) [facility] 2)  3)   Referrals sent to facilities via [facility]   CM will f/u

## 2019-08-12 NOTE — ASSESSMENT & PLAN NOTE
Lab Results   Component Value Date    HGBA1C 4 9 08/10/2019     Recent Labs     08/11/19  1637 08/11/19  2056 08/12/19  0709 08/12/19  1116   POCGLU 80 158* 80 90     Glucose stable on glargine 35 units in a m  and sliding scale

## 2019-08-12 NOTE — ASSESSMENT & PLAN NOTE
Fall at home appears secondary to orthostatic hypotension  Fludrocortisone added by cardiology with improvement    Patient agreeable to SNF

## 2019-08-13 ENCOUNTER — EPISODE CHANGES (OUTPATIENT)
Dept: CASE MANAGEMENT | Facility: HOSPITAL | Age: 59
End: 2019-08-13

## 2019-08-13 VITALS
DIASTOLIC BLOOD PRESSURE: 54 MMHG | OXYGEN SATURATION: 96 % | HEIGHT: 72 IN | RESPIRATION RATE: 19 BRPM | BODY MASS INDEX: 32.37 KG/M2 | HEART RATE: 63 BPM | WEIGHT: 239 LBS | TEMPERATURE: 97.8 F | SYSTOLIC BLOOD PRESSURE: 106 MMHG

## 2019-08-13 LAB
GLUCOSE SERPL-MCNC: 121 MG/DL (ref 65–140)
GLUCOSE SERPL-MCNC: 76 MG/DL (ref 65–140)

## 2019-08-13 PROCEDURE — 99239 HOSP IP/OBS DSCHRG MGMT >30: CPT | Performed by: INTERNAL MEDICINE

## 2019-08-13 PROCEDURE — 82948 REAGENT STRIP/BLOOD GLUCOSE: CPT

## 2019-08-13 RX ORDER — FLUDROCORTISONE ACETATE 0.1 MG/1
0.1 TABLET ORAL DAILY
Refills: 0 | Status: SHIPPED | OUTPATIENT
Start: 2019-08-13

## 2019-08-13 RX ORDER — COLCHICINE 0.6 MG/1
0.6 TABLET ORAL 2 TIMES DAILY
Qty: 4 TABLET | Refills: 0 | Status: SHIPPED | OUTPATIENT
Start: 2019-08-13 | End: 2020-01-31 | Stop reason: HOSPADM

## 2019-08-13 RX ADMIN — FOLIC ACID 1000 MCG: 1 TABLET ORAL at 10:00

## 2019-08-13 RX ADMIN — FLUDROCORTISONE ACETATE 0.1 MG: 0.1 TABLET ORAL at 10:01

## 2019-08-13 RX ADMIN — LUBIPROSTONE 24 MCG: 24 CAPSULE, GELATIN COATED ORAL at 10:01

## 2019-08-13 RX ADMIN — HEPARIN SODIUM 5000 UNITS: 5000 INJECTION INTRAVENOUS; SUBCUTANEOUS at 05:44

## 2019-08-13 RX ADMIN — PANTOPRAZOLE SODIUM 40 MG: 40 TABLET, DELAYED RELEASE ORAL at 05:44

## 2019-08-13 RX ADMIN — MORPHINE SULFATE 60 MG: 30 TABLET, EXTENDED RELEASE ORAL at 09:59

## 2019-08-13 RX ADMIN — CYANOCOBALAMIN TAB 500 MCG 500 MCG: 500 TAB at 10:00

## 2019-08-13 RX ADMIN — POTASSIUM CHLORIDE 40 MEQ: 1500 TABLET, EXTENDED RELEASE ORAL at 10:00

## 2019-08-13 RX ADMIN — BACLOFEN 10 MG: 10 TABLET ORAL at 10:00

## 2019-08-13 RX ADMIN — GABAPENTIN 800 MG: 400 CAPSULE ORAL at 10:00

## 2019-08-13 RX ADMIN — COLCHICINE 0.6 MG: 0.6 TABLET, FILM COATED ORAL at 10:00

## 2019-08-13 RX ADMIN — Medication 1 TABLET: at 09:59

## 2019-08-13 RX ADMIN — INSULIN GLARGINE 35 UNITS: 100 INJECTION, SOLUTION SUBCUTANEOUS at 10:00

## 2019-08-13 RX ADMIN — ASPIRIN 81 MG: 81 TABLET, COATED ORAL at 09:59

## 2019-08-13 RX ADMIN — OXYCODONE HYDROCHLORIDE 30 MG: 10 TABLET ORAL at 09:58

## 2019-08-13 RX ADMIN — FLUTICASONE FUROATE AND VILANTEROL TRIFENATATE 1 PUFF: 200; 25 POWDER RESPIRATORY (INHALATION) at 09:58

## 2019-08-13 RX ADMIN — CALCIUM CARBONATE 500 MG (1,250 MG)-VITAMIN D3 200 UNIT TABLET 2 TABLET: at 10:00

## 2019-08-13 NOTE — DISCHARGE SUMMARY
Discharge Summary - [facility]    Patient Information: Don Briggs 66 y o  male MRN: [id number]  Unit/Bed#: E4 -01 Encounter: [id number]    Discharging Physician / Practitioner: Pamela Pickett MD  PCP: Macie Carl MD  Admission Date: 8/8/2019  Discharge Date: 08/13/19    Reason for Admission:  Falls    Discharge Diagnoses:  Orthostatic hypotension    Principal Problem:    Orthostatic hypotension  Active Problems:    Type 2 diabetes mellitus with renal complication (HCC)    Chronic diastolic congestive heart failure (HCC)    Chronic pain disorder    Stage 3 chronic kidney disease ([address] )    Fall at home    Pressure injury of skin of sacral region    Symptomatic bradycardia    Bilateral foot pain  Resolved Problems:    Essential hypertension      Consultations During Hospital Stay:  · Wound care    Procedures Performed:     Xr Chest 1 View Portable    Result Date: 8/8/2019  Narrative: CHEST INDICATION:   falls  COMPARISON:  Chest x-ray dated May 2, 2019  EXAM PERFORMED/VIEWS:  XR CHEST PORTABLE FINDINGS: Cardiomediastinal silhouette appears unremarkable  The lungs are clear  No pneumothorax or pleural effusion  Osseous structures appear within normal limits for patient age  Impression: No acute cardiopulmonary disease  Workstation performed: [id number]     Xr Ankle 3+ Vw Left    Result Date: 8/12/2019  Narrative: LEFT ANKLE INDICATION:   Pain and swelling status post fall  COMPARISON:  None VIEWS:  XR ANKLE 3+ VW LEFT FINDINGS: There is no acute fracture or dislocation  Calcaneal spur(s) noted  No lytic or blastic lesions seen  Soft tissues are unremarkable  Impression: No acute osseous abnormality  Workstation performed: [id number]     Xr Ankle 3+ Vw Right    Result Date: 8/12/2019  Narrative: RIGHT ANKLE INDICATION:   Pain and swelling status post fall  COMPARISON:  None VIEWS:  XR ANKLE 3+ VW RIGHT FINDINGS: There is no acute fracture or dislocation   Calcaneal spur(s) noted  No lytic or blastic lesions seen  Soft tissues are unremarkable  Impression: No acute osseous abnormality  Workstation performed: [id number]     Xr Foot 3+ Vw Left    Result Date: 8/8/2019  Narrative: LEFT FOOT INDICATION:   2nd and 3rd toe ecchymosis  COMPARISON:  Left foot x-ray dated July 18, 2019  VIEWS:  XR FOOT 3+ VW LEFT FINDINGS: There is no acute fracture or dislocation  No significant degenerative changes  No lytic or blastic lesions seen  Soft tissues are unremarkable  Impression: No acute osseous abnormality  Workstation performed: [id number]     Ct Head Without Contrast    Result Date: 8/8/2019  Narrative: CT BRAIN - WITHOUT CONTRAST INDICATION:   falls  COMPARISON:  May 4, 2019 TECHNIQUE:  CT examination of the brain was performed  In addition to axial images, coronal 2D reformatted images were created and submitted for interpretation  Radiation dose length product (DLP) for this visit:  897 mGy-cm   This examination, like all CT scans performed in the [facility], was performed utilizing techniques to minimize radiation dose exposure, including the use of iterative reconstruction and automated exposure control  IMAGE QUALITY:  Diagnostic  FINDINGS: PARENCHYMA:  No intracranial mass, mass effect or midline shift  No CT signs of acute infarction  No acute parenchymal hemorrhage  Small regions of encephalomalacia in the medial left occipital lobe, and to a lesser extent right occipital lobe, stable from prior exam   Minimal chronic microvascular white matter ischemic changes  Significant beam hardening artifact from right ICA bifurcation aneurysm embolization coils  VENTRICLES AND EXTRA-AXIAL SPACES:  Stable from prior exam   Right lateral ventricle is slightly larger than the left with resulting subtle leftward subfalcine shift, within range of normal and stable from prior exam   No hydrocephalus   VISUALIZED ORBITS AND PARANASAL SINUSES:  No acute abnormality involving the orbits  Minimal scattered sinus mucosal thickening is noted  No fluid levels are seen  CALVARIUM AND EXTRACRANIAL SOFT TISSUES:  Normal      Impression: No acute intracranial abnormality  Workstation performed: [id number]     Ct Cervical Spine Without Contrast    Result Date: 8/8/2019  Narrative: CT CERVICAL SPINE - WITHOUT CONTRAST INDICATION:   falls  COMPARISON:  None  TECHNIQUE:  CT examination of the cervical spine was performed without intravenous contrast   Contiguous axial images were obtained  Sagittal and coronal reconstructions were performed  Radiation dose length product (DLP) for this visit:  693 mGy-cm   This examination, like all CT scans performed in the [facility], was performed utilizing techniques to minimize radiation dose exposure, including the use of iterative reconstruction and automated exposure control  IMAGE QUALITY:  Diagnostic  FINDINGS: ALIGNMENT:  Straightening of lordotic curvature  Concave left cervical /thoracic scoliotic curvature  No spondylolisthesis  No jumped or perched facets  VERTEBRAL BODIES:  No fracture  DEGENERATIVE CHANGES:  Degenerative changes most pronounced at the C1-C2 articulation anteriorly PREVERTEBRAL AND PARASPINAL SOFT TISSUES:  Unremarkable  THORACIC INLET:  Normal      Impression: No cervical spine fracture or traumatic malalignment  Workstation performed: [id number]     Ct Abdomen Pelvis With Contrast    Result Date: 8/8/2019  Narrative: CT ABDOMEN AND PELVIS WITH IV CONTRAST INDICATION:   falls, diffuse abd tenderness, worse periumbilical  COMPARISON:  April 4, 2014 TECHNIQUE:  CT examination of the abdomen and pelvis was performed  Axial, sagittal, and coronal 2D reformatted images were created from the source data and submitted for interpretation  Radiation dose length product (DLP) for this visit:  1063 mGy-cm     This examination, like all CT scans performed in the [facility], was performed utilizing techniques to minimize radiation dose exposure, including the use of iterative reconstruction and automated exposure control  IV Contrast:  100 mL of iohexol (OMNIPAQUE) Enteric Contrast:  Enteric contrast was not administered  FINDINGS: ABDOMEN LOWER CHEST:  Stable tiny right middle lobe pulmonary nodule  Cardiac size within normal limits  Coronary and aortic atherosclerosis  No pericardial effusion  Fluid-filled esophagus suggesting gastroesophageal reflux  LIVER/BILIARY TREE:  Liver is slightly enlarged  Otherwise unremarkable appearance  No biliary ductal dilatation  GALLBLADDER:  No calcified gallstones  No pericholecystic inflammatory change  SPLEEN:  Enlarged measuring more than 16 cm, similar to previous  PANCREAS:  Unremarkable  ADRENAL GLANDS:  Unremarkable  KIDNEYS/URETERS:  Unremarkable  No hydronephrosis  STOMACH AND BOWEL:  Postoperative changes to the stomach suggest a gastric sleeve  Limited evaluation of GI tract without oral contrast   Small bowel appears average caliber  Formed stool the colon without obstruction  Colonic diverticulosis without evidence of diverticulitis APPENDIX:  No findings to suggest appendicitis  ABDOMINOPELVIC CAVITY:  No ascites or free intraperitoneal air  No lymphadenopathy  VESSELS:  Calcified atherosclerotic plaque  PELVIS REPRODUCTIVE ORGANS:  Unremarkable for patient's age  URINARY BLADDER:  Mild bladder wall thickening probably due to a combination of trabeculation and incomplete distention  ABDOMINAL WALL/INGUINAL REGIONS:  Postoperative changes compatible with hernia repair at the level of the umbilicus  There is slight diastases of the rectus muscles without a convincing recurrent hernia  There is mild skin thickening involving the umbilicus which may represent inflammation  OSSEOUS STRUCTURES:  No acute fracture or destructive osseous lesion   There is significant skin thickening in the region of the gluteal crease and in the soft tissues overlying the lower sacrum and coccyx  No obvious fluid collection or soft tissue gas to confirm a sacral decubitus  There is a metallic medical device in the subcutaneous tissues of the left lower abdominal wall laterally with a thin radiopaque catheter extending into the thecal sac at the level of the lumbar and sacral spine extending from the L2-L3 level to the S2 level     Impression: No visceral or osseous injury identified  Skin thickening in the region of the umbilicus which may represent inflammation  Significant skin thickening overlying lower sacrum/coccyx and the upper gluteal fold  No obvious abscess on this noncontrast CT  Please clinically evaluate for sacral decubitus  Coronary and aortic atherosclerosis  Fluid-filled esophagus suggesting gastroesophageal reflux  Mild hepatosplenomegaly  Colonic diverticulosis without evidence of diverticulitis  Workstation performed: [id number]         Significant Findings:     Orthostatic hypotension  Assessment & Plan  Fall at home appears secondary to orthostatic hypotension  Fludrocortisone added by cardiology with improvement  Patient agreeable to SNF [place] [facility]     Symptomatic bradycardia  Assessment & Plan  Admission for possible symptomatic bradycardia but seen by Cardiology and thought telemetry was not sensing heart rate  Telemetry has been discontinued without further issues     Bilateral foot pain  Assessment & Plan  Bilateral feet pain  Plain films without fractures  Consideration for gouty arthritis    Will trial colchicine x1days year old from this may be low as his pain issues do not present has typical gouty arthropathy presentation      Stage 3 chronic kidney disease ([facility] [address] )  Assessment & Plan  CKD 3 at baseline            Results from last 7 days   Lab Units 08/10/19  0541 08/09/19  0529 08/08/19  1626   BUN mg/dL 9 7 8   CREATININE mg/dL 0 89 0 84 1 05   EGFR ml/min/1 73sq m 96 98 79         Chronic pain disorder  Assessment & Plan  Chronic pain on MS Contin, oxycodone, and morphine pump     Chronic diastolic congestive heart failure (HCC)  Assessment & Plan      Wt Readings from Last 3 Encounters:   08/08/19 108 kg (239 lb)   06/25/19 111 kg (244 lb 6 4 oz)   06/12/19 117 kg (259 lb)      Chronic diastolic CHF remains euvolemic  Torsemide discontinued due to hypotension  Cardiology recommending increased fluid and salt intake       Type 2 diabetes mellitus with renal complication [facility])  Assessment & Plan        Lab Results   Component Value Date     HGBA1C 4 9 08/10/2019             Recent Labs     08/11/19  1637 08/11/19  2056 08/12/19  0709 08/12/19  1116   POCGLU 80 158* 80 90      Glucose stable on glargine 35 units in a m  and sliding scale        ·     Incidental Findings:  Sacral wound seen by wound care with follow recommendations  1-Hydraguard lotion to bilateral heels BID and PRN  2-Elevate heels to offload pressure  3-EHOB offloading cushion in chair when out of bed  4-Moisturize skin daily with skin nourishing cream   5-Encourage/remind patient to turn/reposition q2h or when medically stable for pressure re-distribution on skin  6-P500 low air-loss mattress  7-Sacral wound--cleanse with normal saline, pat dry   3m [place] no sting skin barrier film to shae-wound skin   Pack sacral wound with saline moistened 1/2 inch plain packing   Cover with ABD and secure with tape   Change dressing daily  Test Results Pending at Discharge (will require follow up): · Non     Outpatient Tests Requested:  · * non    Complications:  None    Hospital Course:     Demond Mitchell is a 66 y o  male patient who originally presented to the hospital on 8/8/2019 due to please see above significant findings for hospital course and treatment plan      Condition at Discharge: stable     Discharge Day Visit / Exam:     * Please refer to separate progress for these details *    Discharge instructions/Information to patient and family:   See after visit summary for information provided to patient and family  Provisions for Follow-Up Care:  See after visit summary for information related to follow-up care and any pertinent home health orders  Disposition:     Acute Rehab at [address] [place] [address] SNF:   · Not Applicable to this Patient - Not Applicable to this Patient      Discharge Statement:  I spent 56 minutes discharging the patient  This time was spent on the day of discharge  I had direct contact with the patient on the day of discharge  Greater than 50% of the total time was spent examining patient, answering all patient questions, arranging and discussing plan of care with patient as well as directly providing post-discharge instructions  Additional time then spent on discharge activities  Discharge Medications:  See after visit summary for reconciled discharge medications provided to patient and family  ** Please Note: Dragon 360 Dictation voice to text software may have been used in the creation of this document   **

## 2019-08-13 NOTE — PLAN OF CARE
Problem: Potential for Falls  Goal: Patient will remain free of falls  Description  INTERVENTIONS:  - Assess patient frequently for physical needs  -  Identify cognitive and physical deficits and behaviors that affect risk of falls  -  [place] fall precautions as indicated by assessment   - Educate patient/family on patient safety including physical limitations  - Instruct patient to call for assistance with activity based on assessment  - Modify environment to reduce risk of injury  - Consider OT/PT consult to assist with strengthening/mobility  8/13/2019 0723 by Maria Del Rosario Conn RN  Outcome: Progressing  8/13/2019 0721 by Maria Del Rosario Conn RN  Outcome: Progressing     Problem: Prexisting or High Potential for Compromised Skin Integrity  Goal: Skin integrity is maintained or improved  Description  INTERVENTIONS:  - Identify patients at risk for skin breakdown  - Assess and monitor skin integrity  - Assess and monitor nutrition and hydration status  - Monitor labs (i e  albumin)  - Assess for incontinence   - Turn and reposition patient  - Assist with mobility/ambulation  - Relieve pressure over bony prominences  - Avoid friction and shearing  - Provide appropriate hygiene as needed including keeping skin clean and dry  - Evaluate need for skin moisturizer/barrier cream  - Collaborate with interdisciplinary team (i e  Nutrition, Rehabilitation, etc )   - Patient/family teaching  8/13/2019 0723 by Maria Del Rosario Conn RN  Outcome: Progressing  8/13/2019 0721 by Maria Del Rosario Conn RN  Outcome: Progressing     Problem: Nutrition/Hydration-ADULT  Goal: Nutrient/Hydration intake appropriate for improving, restoring or maintaining nutritional needs  Description  Monitor and assess patient's nutrition/hydration status for malnutrition (ex- brittle hair, bruises, dry skin, pale skin and conjunctiva, muscle wasting, smooth red tongue, and disorientation)   Collaborate with interdisciplinary team and initiate plan and interventions as ordered  Monitor patient's weight and dietary intake as ordered or per policy  Utilize nutrition screening tool and intervene per policy  Determine patient's food preferences and provide high-protein, high-caloric foods as appropriate       INTERVENTIONS:  - Monitor oral intake, urinary output, labs, and treatment plans  - Assess nutrition and hydration status and recommend course of action  - Evaluate amount of meals eaten  - Assist patient with eating if necessary   - Allow adequate time for meals  - Recommend/ encourage appropriate diets, oral nutritional supplements, and vitamin/mineral supplements  - Order, calculate, and assess calorie counts as needed  - Recommend, monitor, and adjust tube feedings and TPN/PPN based on assessed needs  - Assess need for intravenous fluids  - Provide specific nutrition/hydration education as appropriate  - Include patient/family/caregiver in decisions related to nutrition  8/13/2019 0723 by Kirsty Almendarez RN  Outcome: Progressing  8/13/2019 0721 by Kirsty Almendarez RN  Outcome: Progressing     Problem: PAIN - ADULT  Goal: Verbalizes/displays adequate comfort level or baseline comfort level  Description  Interventions:  - Encourage patient to monitor pain and request assistance  - Assess pain using appropriate pain scale  - Administer analgesics based on type and severity of pain and evaluate response  - Implement non-pharmacological measures as appropriate and evaluate response  - Consider cultural and social influences on pain and pain management  - Notify physician/advanced practitioner if interventions unsuccessful or patient reports new pain  8/13/2019 0723 by Kirsty Almendarez RN  Outcome: Progressing  8/13/2019 0721 by Kirsty Almendarez RN  Outcome: Progressing     Problem: INFECTION - ADULT  Goal: Absence or prevention of progression during hospitalization  Description  INTERVENTIONS:  - Assess and monitor for signs and symptoms of infection  - Monitor lab/diagnostic results  - Monitor all insertion sites, i e  indwelling lines, tubes, and drains  - Monitor endotracheal (as able) and nasal secretions for changes in amount and color  - [place] appropriate cooling/warming therapies per order  - Administer medications as ordered  - Instruct and encourage patient and family to use good hand hygiene technique  - Identify and instruct in appropriate isolation precautions for identified infection/condition  8/13/2019 0723 by Kenny Nunez RN  Outcome: Progressing  8/13/2019 0721 by Kenny Nunez RN  Outcome: Progressing     Problem: SAFETY ADULT  Goal: Patient will remain free of falls  Description  INTERVENTIONS:  - Assess patient frequently for physical needs  -  Identify cognitive and physical deficits and behaviors that affect risk of falls    -  [place] fall precautions as indicated by assessment   - Educate patient/family on patient safety including physical limitations  - Instruct patient to call for assistance with activity based on assessment  - Modify environment to reduce risk of injury  - Consider OT/PT consult to assist with strengthening/mobility  8/13/2019 0723 by Kenny Nunez RN  Outcome: Progressing  8/13/2019 0721 by Kenny Nunez RN  Outcome: Progressing  Goal: Maintain or return to baseline ADL function  Description  INTERVENTIONS:  -  Assess patient's ability to carry out ADLs; assess patient's baseline for ADL function and identify physical deficits which impact ability to perform ADLs (bathing, care of mouth/teeth, toileting, grooming, dressing, etc )  - Assess/evaluate cause of self-care deficits   - Assess range of motion  - Assess patient's mobility; develop plan if impaired  - Assess patient's need for assistive devices and provide as appropriate  - Encourage maximum independence but intervene and supervise when necessary  ¯ Involve family in performance of ADLs  ¯ Assess for home care needs following discharge   ¯ Request OT consult to assist with ADL evaluation and planning for discharge  ¯ Provide patient education as appropriate  8/13/2019 0723 by Lexie Hoffman RN  Outcome: Progressing  8/13/2019 0721 by Lexie Hoffman RN  Outcome: Progressing  Goal: Maintain or return mobility status to optimal level  Description  INTERVENTIONS:  - Assess patient's baseline mobility status (ambulation, transfers, stairs, etc )    - Identify cognitive and physical deficits and behaviors that affect mobility  - Identify mobility aids required to assist with transfers and/or ambulation (gait belt, sit-to-stand, lift, walker, cane, etc )  - [place] fall precautions as indicated by assessment  - Record patient progress and toleration of activity level on Mobility SBAR; progress patient to next Phase/Stage  - Instruct patient to call for assistance with activity based on assessment  - Request Rehabilitation consult to assist with strengthening/weightbearing, etc   8/13/2019 0723 by Lexie Hoffman RN  Outcome: Progressing  8/13/2019 0721 by Lexie Hoffman RN  Outcome: Progressing     Problem: DISCHARGE PLANNING  Goal: Discharge to home or other facility with appropriate resources  Description  INTERVENTIONS:  - Identify barriers to discharge w/patient and caregiver  - Arrange for needed discharge resources and transportation as appropriate  - Identify discharge learning needs (meds, wound care, etc )  - Arrange for interpretive services to assist at discharge as needed  - Refer to Case Management Department for coordinating discharge planning if the patient needs post-hospital services based on physician/advanced practitioner order or complex needs related to functional status, cognitive ability, or social support system  8/13/2019 0723 by Lexie Hoffman RN  Outcome: Progressing  8/13/2019 0721 by Lexie Hoffman RN  Outcome: Progressing     Problem: Knowledge Deficit  Goal: Patient/family/caregiver demonstrates understanding of disease process, treatment plan, medications, and discharge instructions  Description  Complete learning assessment and assess knowledge base  Interventions:  - Provide teaching at level of understanding  - Provide teaching via preferred learning methods  8/13/2019 0723 by Joanna Alva RN  Outcome: Progressing  8/13/2019 0721 by Joanna Alva RN  Outcome: Progressing     Problem: CARDIOVASCULAR - ADULT  Goal: Maintains optimal cardiac output and hemodynamic stability  Description  INTERVENTIONS:  - Monitor I/O, vital signs and rhythm  - Monitor for S/S and trends of decreased cardiac output i e  bleeding, hypotension  - Administer and titrate ordered vasoactive medications to optimize hemodynamic stability  - Assess quality of pulses, skin color and temperature  - Assess for signs of decreased coronary artery perfusion - ex   Angina  - Instruct patient to report change in severity of symptoms  8/13/2019 0723 by Joanna Alva RN  Outcome: Progressing  8/13/2019 0721 by Joanna Alva RN  Outcome: Progressing     Problem: SKIN/TISSUE INTEGRITY - ADULT  Goal: Skin integrity remains intact  Description  INTERVENTIONS  - Identify patients at risk for skin breakdown  - Assess and monitor skin integrity  - Assess and monitor nutrition and hydration status  - Monitor labs (i e  albumin)  - Assess for incontinence   - Turn and reposition patient  - Assist with mobility/ambulation  - Relieve pressure over bony prominences  - Avoid friction and shearing  - Provide appropriate hygiene as needed including keeping skin clean and dry  - Evaluate need for skin moisturizer/barrier cream  - Collaborate with interdisciplinary team (i e  Nutrition, Rehabilitation, etc )   - Patient/family teaching  8/13/2019 0723 by Joanna Alva RN  Outcome: Progressing  8/13/2019 0721 by Joanna Alva RN  Outcome: Progressing  Goal: Incision(s), wounds(s) or drain site(s) healing without S/S of infection  Description  INTERVENTIONS  - Assess and document risk factors for skin impairment   - Assess and document dressing, incision, wound bed, drain sites and surrounding tissue  - Initiate Nutrition services consult and/or wound management as needed  8/13/2019 0723 by Regulo Gonzalez RN  Outcome: Progressing  8/13/2019 0721 by Regulo Gonzalez RN  Outcome: Progressing  Goal: Oral mucous membranes remain intact  Description  INTERVENTIONS  - Assess oral mucosa and hygiene practices  - Implement preventative oral hygiene regimen  - Implement oral medicated treatments as ordered  - Initiate Nutrition services referral as needed  8/13/2019 0723 by Regulo Gonzalez RN  Outcome: Progressing  8/13/2019 0721 by Regulo Gonzalez RN  Outcome: Progressing     Problem: METABOLIC, FLUID AND ELECTROLYTES - ADULT  Goal: Glucose maintained within target range  Description  INTERVENTIONS:  - Monitor Blood Glucose as ordered  - Assess for signs and symptoms of hyperglycemia and hypoglycemia  - Administer ordered medications to maintain glucose within target range  - Assess nutritional intake and initiate nutrition service referral as needed  8/13/2019 0723 by Regulo Gonzalez RN  Outcome: Progressing  8/13/2019 0721 by Regulo Gonzalez RN  Outcome: Progressing

## 2019-08-13 NOTE — SOCIAL WORK
Pt is written for d/c today  [facility] [place] [facility] [place] is accepting for STR and pt agrees  Cm set up [facility] transport with [facility]  P/u at 1:45pm  MD, RN and patient made aware

## 2019-08-13 NOTE — PROGRESS NOTES
RN called to speak with receiving RN, was placed on hold   took this RN's phone number and will call for report  RN will await phone call

## 2019-08-13 NOTE — PLAN OF CARE
Problem: Potential for Falls  Goal: Patient will remain free of falls  Description  INTERVENTIONS:  - Assess patient frequently for physical needs  -  Identify cognitive and physical deficits and behaviors that affect risk of falls  -  [place] fall precautions as indicated by assessment   - Educate patient/family on patient safety including physical limitations  - Instruct patient to call for assistance with activity based on assessment  - Modify environment to reduce risk of injury  - Consider OT/PT consult to assist with strengthening/mobility  Outcome: Progressing     Problem: Prexisting or High Potential for Compromised Skin Integrity  Goal: Skin integrity is maintained or improved  Description  INTERVENTIONS:  - Identify patients at risk for skin breakdown  - Assess and monitor skin integrity  - Assess and monitor nutrition and hydration status  - Monitor labs (i e  albumin)  - Assess for incontinence   - Turn and reposition patient  - Assist with mobility/ambulation  - Relieve pressure over bony prominences  - Avoid friction and shearing  - Provide appropriate hygiene as needed including keeping skin clean and dry  - Evaluate need for skin moisturizer/barrier cream  - Collaborate with interdisciplinary team (i e  Nutrition, Rehabilitation, etc )   - Patient/family teaching  Outcome: Progressing     Problem: Nutrition/Hydration-ADULT  Goal: Nutrient/Hydration intake appropriate for improving, restoring or maintaining nutritional needs  Description  Monitor and assess patient's nutrition/hydration status for malnutrition (ex- brittle hair, bruises, dry skin, pale skin and conjunctiva, muscle wasting, smooth red tongue, and disorientation)  Collaborate with interdisciplinary team and initiate plan and interventions as ordered  Monitor patient's weight and dietary intake as ordered or per policy  Utilize nutrition screening tool and intervene per policy   Determine patient's food preferences and provide high-protein, high-caloric foods as appropriate       INTERVENTIONS:  - Monitor oral intake, urinary output, labs, and treatment plans  - Assess nutrition and hydration status and recommend course of action  - Evaluate amount of meals eaten  - Assist patient with eating if necessary   - Allow adequate time for meals  - Recommend/ encourage appropriate diets, oral nutritional supplements, and vitamin/mineral supplements  - Order, calculate, and assess calorie counts as needed  - Recommend, monitor, and adjust tube feedings and TPN/PPN based on assessed needs  - Assess need for intravenous fluids  - Provide specific nutrition/hydration education as appropriate  - Include patient/family/caregiver in decisions related to nutrition  Outcome: Progressing     Problem: PAIN - ADULT  Goal: Verbalizes/displays adequate comfort level or baseline comfort level  Description  Interventions:  - Encourage patient to monitor pain and request assistance  - Assess pain using appropriate pain scale  - Administer analgesics based on type and severity of pain and evaluate response  - Implement non-pharmacological measures as appropriate and evaluate response  - Consider cultural and social influences on pain and pain management  - Notify physician/advanced practitioner if interventions unsuccessful or patient reports new pain  Outcome: Progressing     Problem: INFECTION - ADULT  Goal: Absence or prevention of progression during hospitalization  Description  INTERVENTIONS:  - Assess and monitor for signs and symptoms of infection  - Monitor lab/diagnostic results  - Monitor all insertion sites, i e  indwelling lines, tubes, and drains  - Monitor endotracheal (as able) and nasal secretions for changes in amount and color  - [place] appropriate cooling/warming therapies per order  - Administer medications as ordered  - Instruct and encourage patient and family to use good hand hygiene technique  - Identify and instruct in appropriate isolation precautions for identified infection/condition  Outcome: Progressing     Problem: SAFETY ADULT  Goal: Patient will remain free of falls  Description  INTERVENTIONS:  - Assess patient frequently for physical needs  -  Identify cognitive and physical deficits and behaviors that affect risk of falls    -  [place] fall precautions as indicated by assessment   - Educate patient/family on patient safety including physical limitations  - Instruct patient to call for assistance with activity based on assessment  - Modify environment to reduce risk of injury  - Consider OT/PT consult to assist with strengthening/mobility  Outcome: Progressing  Goal: Maintain or return to baseline ADL function  Description  INTERVENTIONS:  -  Assess patient's ability to carry out ADLs; assess patient's baseline for ADL function and identify physical deficits which impact ability to perform ADLs (bathing, care of mouth/teeth, toileting, grooming, dressing, etc )  - Assess/evaluate cause of self-care deficits   - Assess range of motion  - Assess patient's mobility; develop plan if impaired  - Assess patient's need for assistive devices and provide as appropriate  - Encourage maximum independence but intervene and supervise when necessary  ¯ Involve family in performance of ADLs  ¯ Assess for home care needs following discharge   ¯ Request OT consult to assist with ADL evaluation and planning for discharge  ¯ Provide patient education as appropriate  Outcome: Progressing  Goal: Maintain or return mobility status to optimal level  Description  INTERVENTIONS:  - Assess patient's baseline mobility status (ambulation, transfers, stairs, etc )    - Identify cognitive and physical deficits and behaviors that affect mobility  - Identify mobility aids required to assist with transfers and/or ambulation (gait belt, sit-to-stand, lift, walker, cane, etc )  - [place] fall precautions as indicated by assessment  - Record patient progress and toleration of activity level on Mobility SBAR; progress patient to next Phase/Stage  - Instruct patient to call for assistance with activity based on assessment  - Request Rehabilitation consult to assist with strengthening/weightbearing, etc   Outcome: Progressing     Problem: DISCHARGE PLANNING  Goal: Discharge to home or other facility with appropriate resources  Description  INTERVENTIONS:  - Identify barriers to discharge w/patient and caregiver  - Arrange for needed discharge resources and transportation as appropriate  - Identify discharge learning needs (meds, wound care, etc )  - Arrange for interpretive services to assist at discharge as needed  - Refer to Case Management Department for coordinating discharge planning if the patient needs post-hospital services based on physician/advanced practitioner order or complex needs related to functional status, cognitive ability, or social support system  Outcome: Progressing     Problem: Knowledge Deficit  Goal: Patient/family/caregiver demonstrates understanding of disease process, treatment plan, medications, and discharge instructions  Description  Complete learning assessment and assess knowledge base  Interventions:  - Provide teaching at level of understanding  - Provide teaching via preferred learning methods  Outcome: Progressing     Problem: CARDIOVASCULAR - ADULT  Goal: Maintains optimal cardiac output and hemodynamic stability  Description  INTERVENTIONS:  - Monitor I/O, vital signs and rhythm  - Monitor for S/S and trends of decreased cardiac output i e  bleeding, hypotension  - Administer and titrate ordered vasoactive medications to optimize hemodynamic stability  - Assess quality of pulses, skin color and temperature  - Assess for signs of decreased coronary artery perfusion - ex   Angina  - Instruct patient to report change in severity of symptoms  Outcome: Progressing     Problem: SKIN/TISSUE INTEGRITY - ADULT  Goal: Skin integrity remains intact  Description  INTERVENTIONS  - Identify patients at risk for skin breakdown  - Assess and monitor skin integrity  - Assess and monitor nutrition and hydration status  - Monitor labs (i e  albumin)  - Assess for incontinence   - Turn and reposition patient  - Assist with mobility/ambulation  - Relieve pressure over bony prominences  - Avoid friction and shearing  - Provide appropriate hygiene as needed including keeping skin clean and dry  - Evaluate need for skin moisturizer/barrier cream  - Collaborate with interdisciplinary team (i e  Nutrition, Rehabilitation, etc )   - Patient/family teaching  Outcome: Progressing  Goal: Incision(s), wounds(s) or drain site(s) healing without S/S of infection  Description  INTERVENTIONS  - Assess and document risk factors for skin impairment   - Assess and document dressing, incision, wound bed, drain sites and surrounding tissue  - Initiate Nutrition services consult and/or wound management as needed  Outcome: Progressing  Goal: Oral mucous membranes remain intact  Description  INTERVENTIONS  - Assess oral mucosa and hygiene practices  - Implement preventative oral hygiene regimen  - Implement oral medicated treatments as ordered  - Initiate Nutrition services referral as needed  Outcome: Progressing     Problem: METABOLIC, FLUID AND ELECTROLYTES - ADULT  Goal: Glucose maintained within target range  Description  INTERVENTIONS:  - Monitor Blood Glucose as ordered  - Assess for signs and symptoms of hyperglycemia and hypoglycemia  - Administer ordered medications to maintain glucose within target range  - Assess nutritional intake and initiate nutrition service referral as needed  Outcome: Progressing

## 2019-08-13 NOTE — PLAN OF CARE
Problem: Potential for Falls  Goal: Patient will remain free of falls  Description  INTERVENTIONS:  - Assess patient frequently for physical needs  -  Identify cognitive and physical deficits and behaviors that affect risk of falls  -  [place] fall precautions as indicated by assessment   - Educate patient/family on patient safety including physical limitations  - Instruct patient to call for assistance with activity based on assessment  - Modify environment to reduce risk of injury  - Consider OT/PT consult to assist with strengthening/mobility  Outcome: Progressing     Problem: Prexisting or High Potential for Compromised Skin Integrity  Goal: Skin integrity is maintained or improved  Description  INTERVENTIONS:  - Identify patients at risk for skin breakdown  - Assess and monitor skin integrity  - Assess and monitor nutrition and hydration status  - Monitor labs (i e  albumin)  - Assess for incontinence   - Turn and reposition patient  - Assist with mobility/ambulation  - Relieve pressure over bony prominences  - Avoid friction and shearing  - Provide appropriate hygiene as needed including keeping skin clean and dry  - Evaluate need for skin moisturizer/barrier cream  - Collaborate with interdisciplinary team (i e  Nutrition, Rehabilitation, etc )   - Patient/family teaching  Outcome: Progressing     Problem: Nutrition/Hydration-ADULT  Goal: Nutrient/Hydration intake appropriate for improving, restoring or maintaining nutritional needs  Description  Monitor and assess patient's nutrition/hydration status for malnutrition (ex- brittle hair, bruises, dry skin, pale skin and conjunctiva, muscle wasting, smooth red tongue, and disorientation)  Collaborate with interdisciplinary team and initiate plan and interventions as ordered  Monitor patient's weight and dietary intake as ordered or per policy  Utilize nutrition screening tool and intervene per policy   Determine patient's food preferences and provide high-protein, high-caloric foods as appropriate       INTERVENTIONS:  - Monitor oral intake, urinary output, labs, and treatment plans  - Assess nutrition and hydration status and recommend course of action  - Evaluate amount of meals eaten  - Assist patient with eating if necessary   - Allow adequate time for meals  - Recommend/ encourage appropriate diets, oral nutritional supplements, and vitamin/mineral supplements  - Order, calculate, and assess calorie counts as needed  - Recommend, monitor, and adjust tube feedings and TPN/PPN based on assessed needs  - Assess need for intravenous fluids  - Provide specific nutrition/hydration education as appropriate  - Include patient/family/caregiver in decisions related to nutrition  Outcome: Progressing     Problem: PAIN - ADULT  Goal: Verbalizes/displays adequate comfort level or baseline comfort level  Description  Interventions:  - Encourage patient to monitor pain and request assistance  - Assess pain using appropriate pain scale  - Administer analgesics based on type and severity of pain and evaluate response  - Implement non-pharmacological measures as appropriate and evaluate response  - Consider cultural and social influences on pain and pain management  - Notify physician/advanced practitioner if interventions unsuccessful or patient reports new pain  Outcome: Progressing     Problem: INFECTION - ADULT  Goal: Absence or prevention of progression during hospitalization  Description  INTERVENTIONS:  - Assess and monitor for signs and symptoms of infection  - Monitor lab/diagnostic results  - Monitor all insertion sites, i e  indwelling lines, tubes, and drains  - Monitor endotracheal (as able) and nasal secretions for changes in amount and color  - [place] appropriate cooling/warming therapies per order  - Administer medications as ordered  - Instruct and encourage patient and family to use good hand hygiene technique  - Identify and instruct in appropriate isolation precautions for identified infection/condition  Outcome: Progressing     Problem: SAFETY ADULT  Goal: Patient will remain free of falls  Description  INTERVENTIONS:  - Assess patient frequently for physical needs  -  Identify cognitive and physical deficits and behaviors that affect risk of falls    -  [place] fall precautions as indicated by assessment   - Educate patient/family on patient safety including physical limitations  - Instruct patient to call for assistance with activity based on assessment  - Modify environment to reduce risk of injury  - Consider OT/PT consult to assist with strengthening/mobility  Outcome: Progressing  Goal: Maintain or return to baseline ADL function  Description  INTERVENTIONS:  -  Assess patient's ability to carry out ADLs; assess patient's baseline for ADL function and identify physical deficits which impact ability to perform ADLs (bathing, care of mouth/teeth, toileting, grooming, dressing, etc )  - Assess/evaluate cause of self-care deficits   - Assess range of motion  - Assess patient's mobility; develop plan if impaired  - Assess patient's need for assistive devices and provide as appropriate  - Encourage maximum independence but intervene and supervise when necessary  ¯ Involve family in performance of ADLs  ¯ Assess for home care needs following discharge   ¯ Request OT consult to assist with ADL evaluation and planning for discharge  ¯ Provide patient education as appropriate  Outcome: Progressing  Goal: Maintain or return mobility status to optimal level  Description  INTERVENTIONS:  - Assess patient's baseline mobility status (ambulation, transfers, stairs, etc )    - Identify cognitive and physical deficits and behaviors that affect mobility  - Identify mobility aids required to assist with transfers and/or ambulation (gait belt, sit-to-stand, lift, walker, cane, etc )  - [place] fall precautions as indicated by assessment  - Record patient progress and toleration of activity level on Mobility SBAR; progress patient to next Phase/Stage  - Instruct patient to call for assistance with activity based on assessment  - Request Rehabilitation consult to assist with strengthening/weightbearing, etc   Outcome: Progressing     Problem: DISCHARGE PLANNING  Goal: Discharge to home or other facility with appropriate resources  Description  INTERVENTIONS:  - Identify barriers to discharge w/patient and caregiver  - Arrange for needed discharge resources and transportation as appropriate  - Identify discharge learning needs (meds, wound care, etc )  - Arrange for interpretive services to assist at discharge as needed  - Refer to Case Management Department for coordinating discharge planning if the patient needs post-hospital services based on physician/advanced practitioner order or complex needs related to functional status, cognitive ability, or social support system  Outcome: Progressing     Problem: Knowledge Deficit  Goal: Patient/family/caregiver demonstrates understanding of disease process, treatment plan, medications, and discharge instructions  Description  Complete learning assessment and assess knowledge base  Interventions:  - Provide teaching at level of understanding  - Provide teaching via preferred learning methods  Outcome: Progressing     Problem: CARDIOVASCULAR - ADULT  Goal: Maintains optimal cardiac output and hemodynamic stability  Description  INTERVENTIONS:  - Monitor I/O, vital signs and rhythm  - Monitor for S/S and trends of decreased cardiac output i e  bleeding, hypotension  - Administer and titrate ordered vasoactive medications to optimize hemodynamic stability  - Assess quality of pulses, skin color and temperature  - Assess for signs of decreased coronary artery perfusion - ex   Angina  - Instruct patient to report change in severity of symptoms  Outcome: Progressing     Problem: SKIN/TISSUE INTEGRITY - ADULT  Goal: Skin integrity remains intact  Description  INTERVENTIONS  - Identify patients at risk for skin breakdown  - Assess and monitor skin integrity  - Assess and monitor nutrition and hydration status  - Monitor labs (i e  albumin)  - Assess for incontinence   - Turn and reposition patient  - Assist with mobility/ambulation  - Relieve pressure over bony prominences  - Avoid friction and shearing  - Provide appropriate hygiene as needed including keeping skin clean and dry  - Evaluate need for skin moisturizer/barrier cream  - Collaborate with interdisciplinary team (i e  Nutrition, Rehabilitation, etc )   - Patient/family teaching  Outcome: Progressing  Goal: Incision(s), wounds(s) or drain site(s) healing without S/S of infection  Description  INTERVENTIONS  - Assess and document risk factors for skin impairment   - Assess and document dressing, incision, wound bed, drain sites and surrounding tissue  - Initiate Nutrition services consult and/or wound management as needed  Outcome: Progressing  Goal: Oral mucous membranes remain intact  Description  INTERVENTIONS  - Assess oral mucosa and hygiene practices  - Implement preventative oral hygiene regimen  - Implement oral medicated treatments as ordered  - Initiate Nutrition services referral as needed  Outcome: Progressing     Problem: METABOLIC, FLUID AND ELECTROLYTES - ADULT  Goal: Glucose maintained within target range  Description  INTERVENTIONS:  - Monitor Blood Glucose as ordered  - Assess for signs and symptoms of hyperglycemia and hypoglycemia  - Administer ordered medications to maintain glucose within target range  - Assess nutritional intake and initiate nutrition service referral as needed  Outcome: Progressing

## 2019-08-13 NOTE — NURSING NOTE
Tonya from [facility] in [facility]"" returned called to [place]  This RN gave report on pts admitting dx, wound care and ambulation  Discussed pt's diet and nutritional supplements  RN discussed pt use of cpap machine and rollator for ambulastion  Pt is to t&r q 2 hours  Drsg chg daily  RN told receiving RN to call with any further questions

## 2019-08-14 ENCOUNTER — PATIENT OUTREACH (OUTPATIENT)
Dept: CASE MANAGEMENT | Facility: OTHER | Age: 59
End: 2019-08-14

## 2019-08-14 ENCOUNTER — EPISODE CHANGES (OUTPATIENT)
Dept: CASE MANAGEMENT | Facility: OTHER | Age: 59
End: 2019-08-14

## 2019-08-14 NOTE — PROGRESS NOTES
Email sent to Jyoti Huston, with [facility], updating on new bundle episode information, including DRG and ELOS  Bundle Communication Tool emailed as well and asked if this may be filled out prior to patient's discharge

## 2019-08-15 ENCOUNTER — DOCUMENTATION (OUTPATIENT)
Dept: NEUROLOGY | Facility: CLINIC | Age: 59
End: 2019-08-15

## 2019-08-15 NOTE — PROGRESS NOTES
Type Date User Summary Attachment   General 08/15/2019  9:42 AM Sabrina Constant Care Coordination  -   Note    Botox- no authorization needed   Please use our stock      Thank you,     Asha Ferreira

## 2019-08-16 ENCOUNTER — APPOINTMENT (OUTPATIENT)
Dept: WOUND CARE | Facility: HOSPITAL | Age: 59
End: 2019-08-16
Payer: MEDICARE

## 2019-08-16 PROCEDURE — 99213 OFFICE O/P EST LOW 20 MIN: CPT | Performed by: FAMILY MEDICINE

## 2019-08-16 NOTE — PROGRESS NOTES
Bundle Communication Tool attached  Patient with target discharge date set for 8/27/19 to home with wife and kids

## 2019-08-26 ENCOUNTER — PATIENT OUTREACH (OUTPATIENT)
Dept: CASE MANAGEMENT | Facility: OTHER | Age: 59
End: 2019-08-26

## 2019-08-26 NOTE — PROGRESS NOTES
[place] [facility] [place] with [facility] on 8/21/19  [facility] was [facility] with [facility] on 8/22/19  Bundle Communication Tool attached

## 2019-08-27 ENCOUNTER — OFFICE VISIT (OUTPATIENT)
Dept: OBGYN CLINIC | Facility: MEDICAL CENTER | Age: 59
End: 2019-08-27
Payer: MEDICARE

## 2019-08-27 VITALS
DIASTOLIC BLOOD PRESSURE: 60 MMHG | HEART RATE: 72 BPM | WEIGHT: 246 LBS | HEIGHT: 72 IN | BODY MASS INDEX: 33.32 KG/M2 | SYSTOLIC BLOOD PRESSURE: 108 MMHG

## 2019-08-27 DIAGNOSIS — M17.0 PRIMARY OSTEOARTHRITIS OF BOTH KNEES: Primary | ICD-10-CM

## 2019-08-27 PROCEDURE — 99213 OFFICE O/P EST LOW 20 MIN: CPT | Performed by: ORTHOPAEDIC SURGERY

## 2019-08-27 NOTE — PROGRESS NOTES
Orthopaedic Surgery - Office Note  Robi Castaneda (78 y o  male)   : 1960   MRN: [id number]  Encounter Date: 2019    Chief Complaint   Patient presents with    Left Knee - Follow-up       Assessment / Plan  Left knee osteoarthritis with resolved MCL sprain   Right knee osteoarthritis    · Continue with Activity as tolerated  · Continue with WBAT   · Patient feels that he has had significant benefit with left knee short hinged knee brace and would like to try 1 for his right knee at this time  · Continue with ice and pain management protocol  · Did briefly discuss treatment options for osteoarthritis of the knee also at this time  Return in about 2 months (around 10/27/2019)  History of Present Illness  Robi Castaneda is a 66 y o  male who presents as a follow up with left knee pain that which occurred after a fall approximately 4 months ago  The patient states he was doing very well and felt he had progressed from the MCL sprain until he had his most recent fall on 2019  At that time he fell directly on the front of both his knees  He did go to the emergency room and due to generalized weakness was sent to rehab for 1 week and transition to a home physical therapy program which they come to visit him twice a week  He continues to have some pain in the anterior aspect of both knees and in the medial joint line mainly of his left knee at this time  He has been using his short hinged knee brace for his left knee and feels that this is helping significantly with his stability  He is also continue with his normal pain management regimen which includes MS Contin twice a day and her oxycodone 30 mg 4 times daily  He the patient also uses a walker normally for ambulation  He can you do a straight leg raise without issue  He and denies any recent changes with any neurological changes distally                                                    Review of Systems  Pertinent items are noted in HPI  All other systems were reviewed and are negative  Physical Exam  /60   Pulse 72   Ht 6' (1 829 m)   Wt 112 kg (246 lb)   BMI 33 36 kg/m²   Cons: Appears well  No apparent distress  Psych: Alert  Oriented x3  Mood and affect normal   Eyes: PERRLA, EOMI  Resp: Normal effort  No audible wheezing or stridor  CV: Palpable pulse  No discernable arrhythmia  No LE edema  Lymph:  No palpable cervical, axillary, or inguinal lymphadenopathy  Skin: Warm  No palpable masses  No visible lesions  Neuro: Normal muscle tone  Normal and symmetric DTR's  Bilateral Knee Exam  Alignment:  Normal knee alignment  Inspection:  mild medial knee swelling  Palpation:  Mild tenderness at The medial joint line of the left knee and anteriorly over the patellar tendon of the right knee     ROM:  Knee Extension 2°  Knee Flexion 110°  Strength:  Able to actively extend knee against gravity  Stability:  No objective knee instability  Stable Varus / Valgus stress, Lachman, and Posterior drawer  Tests:  No pertinent positive or negative tests  Patella:  Patella tracks centrally with crepitus  Neurovascular:  Sensation intact in DP/SP/Douglas/Sa/T nerve distributions  2+ DP & PT pulses  Gait:  Steady with walker  Studies Reviewed  I have personally reviewed pertinent films in PACS and my interpretation is MRI of right knee on 6/20/19 demonstrates a MCL sprain with early degenerative changes  Procedures  No procedures today  Medical, Surgical, Family, and Social History  The patient's medical history, family history, and social history, were reviewed and updated as appropriate      Past Medical History:   Diagnosis Date    Acute on chronic diastolic congestive heart failure (HCC)     Altered gait     Alzheimer disease     per patients,,early onset     Angina pectoris ([address] )     Anxiety     Arthritis     Brain aneurysm     coils placed    Cardiac disease     Chest pain 1/13/2016    Chronic pain     back/ right groin and rle- has morphine pump    Constipation     [facility] obstructive [facility] disease) [facility]     Coronary artery disease     CPAP (continuous positive airway pressure) dependence     Decubital ulcer     sacral decub-occured 5/2019-sees wound care/debide in OR today 6/6/2019    Dependent on walker for ambulation     w/c for long distance    Depression     Diabetes mellitus (HCC)     insulin dependent    Dizziness     occ    Dysphagia     Enlarged prostate     Fall     GERD (gastroesophageal reflux disease)     Heart failure [facility]     Hiatal hernia     Hx of gastric bypass 11/19/2018    Hypercholesterolemia     Hypertension     MI (myocardial infarction) ([facility]     2017- stents x2    Migraine     Morbid obesity ([facility]     gastric bypass sleeve 11/2018-wt loss 125 lb    Neuropathy     Oxygen dependent     Q HS  2LPM with CPAP and prn during day 2-3 LPM     Renal disorder     Shortness of breath     Skin abnormality     sacral wound - covered with pad    Sleep apnea     Stented coronary artery     Stroke ([facility]     vision loss b/l  2005, residual R leg weakness    Urinary frequency     Use of cane as ambulatory aid     Wears dentures     Wears glasses     Wears glasses     Wheelchair dependent        Past Surgical History:   Procedure Laterality Date    BACK SURGERY      BRAIN SURGERY      CARDIAC CATHETERIZATION      with stents    CEREBRAL ANEURYSM REPAIR      with coils    COLONOSCOPY      ESOPHAGOGASTRODUODENOSCOPY N/A 7/1/2016    Procedure: ESOPHAGOGASTRODUODENOSCOPY (EGD); Surgeon: Kandi Shin MD;  [facility];   Service:     GASTRIC BYPASS  11/19/2018    HERNIA REPAIR      HIATAL HERNIA REPAIR      INFUSION PUMP IMPLANTATION Left     morphine    KNEE ARTHROSCOPY Right     KNEE ARTHROSCOPY Right     PERONEAL NERVE DECOMPRESSION Right     SC DEBRIDEMENT OPEN WOUND 20 SQ CM< N/A 6/6/2019 Procedure: EXCISIONAL DEBRIDEMENT OF SACRAL DECUBITUS ULCER;  Surgeon: Shantelle Ybarra MD;  [facility];  Service: General    SC ESOPHAGOGASTRODUODENOSCOPY TRANSORAL DIAGNOSTIC N/A 2/27/2017    Procedure: ESOPHAGOGASTRODUODENOSCOPY (EGD); Surgeon: Kandi Shin MD;  Location:  GI LAB; Service: Gastroenterology    SC ESOPHAGOGASTRODUODENOSCOPY TRANSORAL DIAGNOSTIC N/A 8/23/2018    Procedure: ESOPHAGOGASTRODUODENOSCOPY (EGD) with biopsy;  Surgeon: Kandi Shin MD;  [facility];   Service: Gastroenterology    SC INSERT SPINE INFUSN [facility] N/A 1/19/2017    Procedure: REMOVAL / [facility] [place];  Surgeon: Marissa Aj MD;  [facility];  Service: Orthopedics    SC INSERT SPINE INFUSN [facility] N/A 5/16/2016    Procedure: REPLACEMENT AND PROGRAM PUMP ;  Surgeon: Marissa Aj MD;  [facility];  Service: Orthopedics       Family History   Problem Relation Age of Onset    Diabetes unspecified Mother     Diabetes unspecified Brother     Diabetes unspecified Paternal Uncle     Diabetes unspecified Maternal Grandmother     Diabetes unspecified Paternal Grandmother     Diabetes unspecified Brother        Social History     Occupational History    Not on file   Tobacco Use    Smoking status: Never Smoker    Smokeless tobacco: Never Used   Substance and Sexual Activity    Alcohol use: Yes     Frequency: Monthly or less     Drinks per session: 1 or 2     Binge frequency: Never     Comment: 2 times per year    Drug use: No    Sexual activity: Not on file       No Known Allergies      Current Outpatient Medications:     ADVAIR DISKUS 500-50 MCG/DOSE inhaler, Inhale 1 puff 2 (two) times a day, Disp: , Rfl: 1    albuterol (ACCUNEB) 1 25 MG/3ML nebulizer solution, Take 1 ampule by nebulization every 6 (six) hours as needed for wheezing, Disp: , Rfl:     albuterol (PROVENTIL HFA,VENTOLIN HFA) 90 mcg/act inhaler, Inhale 2 puffs every 6 (six) hours as needed for wheezing, Disp: , Rfl:     AMITIZA 24 MCG capsule, Take 24 mcg by mouth 2 (two) times a day with meals, Disp: , Rfl: 3    aspirin 81 MG tablet, Take 81 mg by mouth daily, Disp: , Rfl:     atorvastatin (LIPITOR) 40 mg tablet, Take 40 mg by mouth daily  , Disp: , Rfl:     baclofen 10 mg tablet, Take 10 mg by mouth 3 (three) times a day, Disp: , Rfl:     CALCIUM PO, Take 1 tablet by mouth daily, Disp: , Rfl:     Cholecalciferol (VITAMIN D3) 5000 units CAPS, Take 1 capsule (5,000 Units total) by mouth daily, Disp: 30 capsule, Rfl: 6    Cyanocobalamin (VITAMIN B-12 PO), Take 1 tablet by mouth daily, Disp: , Rfl:     ergocalciferol (VITAMIN D2) 50,000 units, Take 50,000 Units by mouth once a week, Disp: , Rfl: 0    fludrocortisone (FLORINEF) 0 1 mg tablet, Take 1 tablet (0 1 mg total) by mouth daily, Disp: , Rfl: 0    folic acid (FOLVITE) 1 mg tablet, Take 1,000 mcg by mouth daily, Disp: , Rfl: 3    gabapentin (NEURONTIN) 800 mg tablet, Take 1 tablet (800 mg total) by mouth 2 (two) times a day, Disp: 6 tablet, Rfl: 0    hydrocortisone 1 % lotion, Apply 1 application topically as needed , Disp: , Rfl:     hydrOXYzine HCL (ATARAX) 50 mg tablet, Take 50 mg by mouth 2 (two) times a day , Disp: , Rfl: 1    insulin degludec (TRESIBA FLEXTOUCH) 100 units/mL injection pen, Inject 35 Units under the skin daily Taking 35 units every AM as directed, Disp: , Rfl:     Linaclotide 290 MCG CAPS, Take 290 mcg by mouth as needed , Disp: , Rfl:     Methylnaltrexone Bromide (RELISTOR) 150 MG TABS, Take 450 mg by mouth as needed , Disp: , Rfl:     morphine (MS CONTIN) 60 mg 12 hr tablet, Take 1 tablet (60 mg total) by mouth every 12 (twelve) hoursMax Daily Amount: 120 mg, Disp: 6 tablet, Rfl: 0    Morphine Sulfate Microinfusion (MORPHINE SULF MICROINFUSION PF IJ), Inject 14 mg as directed daily Via infusion pump, Disp: , Rfl:     Multiple Vitamin (MULTIVITAMIN) tablet, Take 1 tablet by mouth daily , Disp: , Rfl:   nitroglycerin (NITROSTAT) 0 4 mg SL tablet, Place 0 4 mg under the tongue every 5 (five) minutes as needed for chest pain (pt has not  used recently)  , Disp: , Rfl:     omeprazole (PriLOSEC) 20 mg delayed release capsule, Take 1 capsule (20 mg total) by mouth daily (Patient taking differently: Take 40 mg by mouth daily ), Disp: 30 capsule, Rfl: 2    oxyCODONE (ROXICODONE) 30 MG immediate release tablet, Take 1 tablet (30 mg total) by mouth every 6 (six) hours as needed for moderate painMax Daily Amount: 120 mg, Disp: 12 tablet, Rfl: 0    polyethylene glycol (GLYCOLAX) powder, MIX 1 HEAPING TABLESPOON (17 G) WITH 8 OUNCES OF WATER  PREPARE AND DRINK SOLUTION ONCE DAILY  , Disp: , Rfl: 5    POTASSIUM PO, Take 40 mEq by mouth 2 (two) times a day, Disp: , Rfl:     prochlorperazine (COMPAZINE) 10 mg tablet, TAKE 1 TABLET (10 MG TOTAL) BY MOUTH EVERY 6 (SIX) HOURS AS NEEDED FOR NAUSEA OR VOMITING , Disp: , Rfl: 0    sertraline (ZOLOFT) 25 mg tablet, Take 25 mg by mouth daily, Disp: , Rfl:     tamsulosin (FLOMAX) 0 4 mg, Take 0 4 mg by mouth daily with dinner , Disp: , Rfl:     traZODone (DESYREL) 100 mg tablet, Take 1 tablet (100 mg total) by mouth daily at bedtime, Disp: 4 tablet, Rfl: 0    ULTICARE SHORT PEN NEEDLES 31G X 8 MM MISC, 4 INJECTIONS PER DAY DX  E11 8, Disp: , Rfl: 1    zolpidem (AMBIEN) 10 mg tablet, Take 10 mg by mouth daily at bedtime as needed for sleep, Disp: , Rfl:     colchicine (COLCRYS) 0 6 mg tablet, Take 1 tablet (0 6 mg total) by mouth 2 (two) times a day for 4 doses, Disp: 4 tablet, Rfl: 0      Scribe Attestation    I,:    am acting as a scribe while in the presence of the attending physician :        I,:    personally performed the services described in this documentation    as scribed in my presence :

## 2019-08-28 ENCOUNTER — PATIENT OUTREACH (OUTPATIENT)
Dept: CASE MANAGEMENT | Facility: OTHER | Age: 59
End: 2019-08-28

## 2019-08-28 NOTE — PROGRESS NOTES
Spoke with patient who was pleasant and receptive to this CMs questions  This CM informed patient of their enrollment in a free medicare bundle program that allows for added telephonic nursing support for 90 days to help coordinate and manage further care  Patient agreed to future outreach  Care Manager confirmed address to mail business card / Medicare bundle information  Patient is knowledgeable about his Diabetes and CHF, medications, upcoming appointments  Patient confirmed that he lives with his wife and three kids, has a total of 5 children  States his wife does the cooking and adheres to low NA diet  Patient reports he does need to make his PCP f/u appointment and denied needing assistance from this CM in making the appointment  Patient reports he checks his sugar daily and it is between   Patient verbalized S&S of hypo and hyperglycemia and appropriate interventions he would take  Patient also verbalized he does frequent foot exams and sees the podiatrist every 2 months  Patient reports he weighs himself every other day and he weighed 246 pounds yesterday at the Dr Vivienne Heimlich  Patient able to verbalize when to call doctor for signs of worsening CHF  Stated he would call for weight gain of 3 pounds in 1 day or 5 pounds in 1 week  Patient reports history of CVA, 12 yrs ago, which affected his eyesight  States he lost part of his vision in both eyes and denies any worsening presently  Patient denies any issues presently and agreed to speaking with this CM next week to complete the telephonic assessment  Medicare bundle information along with business card mailed to patient

## 2019-08-28 NOTE — PROGRESS NOTES
Chart review completed  Patient has had ongoing [facility] skilled nursing visits since 6/1/19  Patient is new to this CM enrolled in bundle program   Per Maria Dolores Apo, patient lives in 2 story house with 3 children, is kelly low NA diet and has rolling walker, cane, wheelchair and hospital bed air mattress  Skilled nursing note from 8/17/19 reads:  [address] [facility] wife to russ    PRECERT VISIT STATUS_mc     FOLLOW UP ON MEDICATION_ Med Compliance teach florinef new   assit with filling 3 x a day pill planner when in home  ASSESS WOUND AND PERFORM WOUND CARE    EDUCATION NEEDED slow positon changes  orthostatic hypotension    DM BID  Noé Glass controlled    CHF  Noé Glass non comp diet and wts  no intrest in learning wants to focus on wc  UPCOMING APPOINTMENTS    DISCHARGE PLAN specify what needs to be completed to meet the patient goals and outcomes  wound healing and understanding of meds    Dr Kenya Mayen 8 30  Dr Daphney Arredondo for knees 08 27  new grabber PT and OT     Dr Missael Mcdermott tbs

## 2019-08-30 ENCOUNTER — APPOINTMENT (OUTPATIENT)
Dept: WOUND CARE | Facility: HOSPITAL | Age: 59
End: 2019-08-30
Payer: MEDICARE

## 2019-08-30 PROCEDURE — 99212 OFFICE O/P EST SF 10 MIN: CPT

## 2019-09-06 ENCOUNTER — PATIENT OUTREACH (OUTPATIENT)
Dept: CASE MANAGEMENT | Facility: OTHER | Age: 59
End: 2019-09-06

## 2019-09-06 NOTE — PROGRESS NOTES
[facility]  Requested a response back from OT

## 2019-09-06 NOTE — PROGRESS NOTES
Chart review completed  Matt/Sabrina skilled nursing note from [address] on 9/5/19 reads:  [address] wife to demo plan dc 9 [phone]  please hav ept signn carlene    PRECERT VISIT STATUS_     FOLLOW UP ON MEDICATION_   assit with filling 3 x a day pill planner when in home  Declined assistance this visit  ASSESS WOUND AND PERFORM WOUND CARE   healing    teach tape tail of packing to remove     EDUCATION NEEDED slow positon changes  dizziness ongoing orthostatic hypotension  Frequent repositioning  DM BID  Sarah Mort controlled    CHF  Sarah Mort non comp diet and wts  no intrest in learning wants to focus on wc  UPCOMING APPOINTMENTS    Dr Melita Castillo 9 13  Dr Inga Borrero for knees 08 27  new grabber PT and OT     Dr Love Sauceda tbs     DISCHARGE PLAN   wound healing and understanding of meds

## 2019-09-06 NOTE — PROGRESS NOTES
Patient reports he feels good and the therapist is due to visit today at 1pm   Reports he had an occupational therapy eval and the therapist suggested you get a new wheel chair  Patient has bed sores and recommended the RoHo cushion / wheelchair  States his wheelchair is 6years old  Patient admits to often feeling depressed because he can't get around like he used  States that:  "I sometimes feel like it would be best if I wasn't around "  CM asked patient who he talks to about his depression and patient replied his family doctor and seees Dr Marjorie Carl a mental [facility] with [facility]  Reports he takes zoloft and hydroxyzine for depression  CM encouraged patient to call the crisis line, 911 or get immediate help if he felt he was going to hurt himself or someone else  Patient states: "I would never hurt someone else -- this is my problem "  Again CM encouraged patient to seek immediate help for these feelings when they occur and patient verbalized agreement  Patient admits to difficulty following asleep most mights and takes trazadone for sleep  Patient states he had a bariatric sleeve last thanksgiving and patient reports it makes it difficult to eat  Reports losing 140 pound: was 380 lbs and is down to 240 pounds  Patient reports bedsore between buttocks by the tailbone that is now approx   5cm   Patient admits to SOB with activity that resolves with rest   Also reports minimal edema in feet but dissolves with elevation  Red flags for CHF discussed and patient able to vebalize same  Able to inform CM of calling the doctor for 3 pound weight gain in 1 day or 5 pound weight gain in 1 week  Patient also able to verbalize S&S of hypo / hyper glycemia and what appropriate interventions to take

## 2019-09-13 ENCOUNTER — APPOINTMENT (OUTPATIENT)
Dept: WOUND CARE | Facility: HOSPITAL | Age: 59
End: 2019-09-13
Payer: MEDICARE

## 2019-09-13 PROCEDURE — 99212 OFFICE O/P EST SF 10 MIN: CPT

## 2019-09-17 ENCOUNTER — PATIENT OUTREACH (OUTPATIENT)
Dept: CASE MANAGEMENT | Facility: OTHER | Age: 59
End: 2019-09-17

## 2019-09-17 NOTE — PROGRESS NOTES
Vargas text cent to OT regarding wheelchair / RoHo cushion  OT replied that patient is to discuss with family Dr Vogt CM informed OT that she will encourage the patient to follow through with discussing with his PCP

## 2019-09-17 NOTE — PROGRESS NOTES
Chart review completed  Patient discharged from [address] [place] [address] skilled nursing services 9/16/19  Skilled nursing visit note below:  Trever Simon  [id number] [facility]  From Visit:  09/12/2019 01:39 PM    LPN supervision needed for visit [phone] 12 19    Nomnc signed for DC 9 16 19  Wound closed  Wife is also ind in [facility] if needed  PRIMARY FOCUS OF HOME HEALTH WC     FOLLOW UP ON MEDICATION_   3 x a day pill planner  Declining sn assistance with filling  ASSESS WOUND AND PERFORM WOUND CARE    No drainage  Closed  EDUCATION NEEDED slow position changes  dizziness ongoing orthostatic hypotension  Frequent repositioning  DM BID  Jerone Drew controlled    CHF  Jerone Drew non comp diet and wts  no interest in learning wants to focus on wc  UPCOMING APPOINTMENTS    Dr Diaz Semen 9 13  Dr Edwina Garcia for knees 08 27  new grabber PT and OT     Dr Honorio Chery tbs     DISCHARGE PLAN   wound healing and understanding of meds

## 2019-09-17 NOTE — PROGRESS NOTES
Patient unable to speak with this CM at this time stating he is a at a cDr  Appointment with his wife  Asked for a call back tomorrow  CM agreed to call patient tomorrow as requested

## 2019-09-17 NOTE — PROGRESS NOTES
Received tiger text call from [facility] stating that patient was agreeable to speaking with his patient about the new wheelchair / cushion  OT reports that wheel chair was loaned to him from a friend and most insurance companies require patient to have a wheel chair before the cushion can be sent  At this time, since his wheelchair was from a friend, there is no record of him having a wheelchair  OT also reports that he was out to the patients home a couple weeks ago and contact Roddy to have a tech deployed to repair his low air-loss mattress

## 2019-09-18 NOTE — PROGRESS NOTES
Unsuccessful attempt at reaching patient  Left name, call back number and message asking for return call

## 2019-09-25 ENCOUNTER — PATIENT OUTREACH (OUTPATIENT)
Dept: CASE MANAGEMENT | Facility: OTHER | Age: 59
End: 2019-09-25

## 2019-09-25 NOTE — PROGRESS NOTES
Called to finish telephonic assessment with patient who sounded audibly groggy  Patient reports this is his normal nap time and requested this CM call him back tomorrow    States he sees PCP tomorrow at 330 and requested this CM call him at 11am

## 2019-09-25 NOTE — PROGRESS NOTES
Chart review completed  Patient discharged from skilled nursing 9/16/19  Matt/Sabrina skilled nursing note reads:  [address]  From Visit:  09/16/2019 12:51 PM    tc to  [address] to [facility] updated dc spoke to  [facility] sacral wound healed  pt still sleeping in hospital bed  for comfort per pt  Dr Giovani Hardy aware pt cont to have pain  and will take time to heal and aware needs to calll to return it        tc to Dr Carleen Rock [phone] spoke to Rod Roque to update on dc non comp with hydroxyzine 50 mg order 1 bid prn anxiety takes 2 tabs enc to follow bottnae and take as ordered office sydni discuss at fu vs      fbs 97     per pt no reading higher than 250  Vero Aguiar

## 2019-09-27 ENCOUNTER — PATIENT OUTREACH (OUTPATIENT)
Dept: CASE MANAGEMENT | Facility: OTHER | Age: 59
End: 2019-09-27

## 2019-09-27 ENCOUNTER — PROCEDURE VISIT (OUTPATIENT)
Dept: NEUROLOGY | Facility: CLINIC | Age: 59
End: 2019-09-27
Payer: MEDICARE

## 2019-09-27 VITALS — TEMPERATURE: 98 F | DIASTOLIC BLOOD PRESSURE: 58 MMHG | SYSTOLIC BLOOD PRESSURE: 84 MMHG | HEART RATE: 79 BPM

## 2019-09-27 DIAGNOSIS — G43.709 CHRONIC MIGRAINE WITHOUT AURA WITHOUT STATUS MIGRAINOSUS, NOT INTRACTABLE: Primary | ICD-10-CM

## 2019-09-27 PROCEDURE — 64615 CHEMODENERV MUSC MIGRAINE: CPT | Performed by: PSYCHIATRY & NEUROLOGY

## 2019-09-27 NOTE — PROGRESS NOTES
Chemodenervation  Date/Time: 9/27/2019 12:44 PM  Performed by: Analy Pretty PA-C  Authorized by: Analy Pretty PA-C     Pre-procedure details:     Prepped With: Alcohol    Anesthesia (see MAR for exact dosages): Anesthesia method:  None  Procedure details:     Position:  Upright  Botox:     Botox Type:  Type A    Brand:  Botox    mL's of Botulinum Toxin:  200    Final Concentration per CC:  200 units    Needle Gauge:  30 G 2 5 inch  Procedures:     Botox Procedures: chronic headache      Indications: migraines    Injection Location:     Head / Face:  L superior cervical paraspinal, R superior cervical paraspinal, L , R , L frontalis, R frontalis, L medial occipitalis, R medial occipitalis, procerus, R temporalis, L temporalis, R superior trapezius and L superior trapezius    L  injection amount:  5 unit(s)    R  injection amount:  5 unit(s)    L lateral frontalis:  5 unit(s)    R lateral frontalis:  5 unit(s)    L medial frontalis:  5 unit(s)    R medial frontalis:  5 unit(s)    L temporalis injection amount:  20 unit(s)    R temporalis injection amount:  20 unit(s)    Procerus injection amount:  5 unit(s)    L medial occipitalis injection amount:  15 unit(s)    R medial occipitalis injection amount:  15 unit(s)    L superior cervical paraspinal injection amount:  10 unit(s)    R superior cervical paraspinal injection amount:  10 unit(s)    L superior trapezius injection amount:  15 unit(s)    R superior trapezius injection amount:  15 unit(s)  Total Units:     Total units used:  200    Total units discarded:  0  Post-procedure details:     Chemodenervation:  Chronic migraine    Facial Nerve Location[de-identified]  Bilateral facial nerve    Patient tolerance of procedure:   Tolerated well, no immediate complications  Comments:      10 units occipitalis  35 units frontalis  All medically necessary

## 2019-10-01 ENCOUNTER — PATIENT OUTREACH (OUTPATIENT)
Dept: CASE MANAGEMENT | Facility: OTHER | Age: 59
End: 2019-10-01

## 2019-10-03 ENCOUNTER — PATIENT OUTREACH (OUTPATIENT)
Dept: CASE MANAGEMENT | Facility: OTHER | Age: 59
End: 2019-10-03

## 2019-10-11 ENCOUNTER — PATIENT OUTREACH (OUTPATIENT)
Dept: CASE MANAGEMENT | Facility: OTHER | Age: 59
End: 2019-10-11

## 2019-10-11 NOTE — PROGRESS NOTES
Unsuccessful attempt at reaching patient at both numbers  Left name, call back number and message asking for return call  Unable to reach letter sent to patient
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
## 2019-10-18 ENCOUNTER — OFFICE VISIT (OUTPATIENT)
Dept: ENDOCRINOLOGY | Facility: CLINIC | Age: 59
End: 2019-10-18
Payer: MEDICARE

## 2019-10-18 ENCOUNTER — OFFICE VISIT (OUTPATIENT)
Dept: NEUROLOGY | Facility: CLINIC | Age: 59
End: 2019-10-18
Payer: MEDICARE

## 2019-10-18 VITALS
WEIGHT: 237 LBS | HEART RATE: 65 BPM | DIASTOLIC BLOOD PRESSURE: 66 MMHG | HEIGHT: 72 IN | SYSTOLIC BLOOD PRESSURE: 116 MMHG | BODY MASS INDEX: 32.1 KG/M2

## 2019-10-18 VITALS
BODY MASS INDEX: 32.15 KG/M2 | SYSTOLIC BLOOD PRESSURE: 110 MMHG | DIASTOLIC BLOOD PRESSURE: 70 MMHG | HEART RATE: 66 BPM | WEIGHT: 237.4 LBS | HEIGHT: 72 IN

## 2019-10-18 DIAGNOSIS — F32.89 OTHER DEPRESSION: ICD-10-CM

## 2019-10-18 DIAGNOSIS — G31.84 MILD COGNITIVE IMPAIRMENT: ICD-10-CM

## 2019-10-18 DIAGNOSIS — E78.5 HYPERLIPIDEMIA, UNSPECIFIED HYPERLIPIDEMIA TYPE: ICD-10-CM

## 2019-10-18 DIAGNOSIS — E55.9 VITAMIN D DEFICIENCY: ICD-10-CM

## 2019-10-18 DIAGNOSIS — I67.1 BRAIN ANEURYSM: ICD-10-CM

## 2019-10-18 DIAGNOSIS — I63.9 CEREBROVASCULAR ACCIDENT (CVA), UNSPECIFIED MECHANISM (HCC): ICD-10-CM

## 2019-10-18 DIAGNOSIS — G47.33 OBSTRUCTIVE SLEEP APNEA SYNDROME: ICD-10-CM

## 2019-10-18 DIAGNOSIS — Z79.4 TYPE 2 DIABETES MELLITUS WITH DIABETIC NEUROPATHY, WITH LONG-TERM CURRENT USE OF INSULIN (HCC): Primary | ICD-10-CM

## 2019-10-18 DIAGNOSIS — I50.32 CHRONIC DIASTOLIC CONGESTIVE HEART FAILURE (HCC): ICD-10-CM

## 2019-10-18 DIAGNOSIS — G43.709 CHRONIC MIGRAINE WITHOUT AURA WITHOUT STATUS MIGRAINOSUS, NOT INTRACTABLE: Primary | ICD-10-CM

## 2019-10-18 DIAGNOSIS — F11.20 UNCOMPLICATED OPIOID DEPENDENCE (HCC): ICD-10-CM

## 2019-10-18 DIAGNOSIS — E11.40 TYPE 2 DIABETES MELLITUS WITH DIABETIC NEUROPATHY, WITH LONG-TERM CURRENT USE OF INSULIN (HCC): Primary | ICD-10-CM

## 2019-10-18 PROCEDURE — 99214 OFFICE O/P EST MOD 30 MIN: CPT | Performed by: INTERNAL MEDICINE

## 2019-10-18 PROCEDURE — 96372 THER/PROPH/DIAG INJ SC/IM: CPT | Performed by: PHYSICIAN ASSISTANT

## 2019-10-18 PROCEDURE — 99215 OFFICE O/P EST HI 40 MIN: CPT | Performed by: PHYSICIAN ASSISTANT

## 2019-10-18 RX ORDER — MEMANTINE HYDROCHLORIDE 10 MG/1
10 TABLET ORAL 2 TIMES DAILY
Qty: 60 TABLET | Refills: 6 | Status: ON HOLD | OUTPATIENT
Start: 2019-10-18 | End: 2020-01-31

## 2019-10-18 RX ORDER — PROCHLORPERAZINE MALEATE 10 MG
10 TABLET ORAL EVERY 6 HOURS PRN
Qty: 20 TABLET | Refills: 0 | Status: SHIPPED | OUTPATIENT
Start: 2019-10-18

## 2019-10-18 RX ORDER — GABAPENTIN 300 MG/1
300 CAPSULE ORAL AS NEEDED
Qty: 30 CAPSULE | Refills: 0 | Status: SHIPPED | OUTPATIENT
Start: 2019-10-18 | End: 2019-11-08 | Stop reason: SDUPTHER

## 2019-10-18 RX ORDER — PROCHLORPERAZINE EDISYLATE 5 MG/ML
10 INJECTION INTRAMUSCULAR; INTRAVENOUS ONCE
Status: COMPLETED | OUTPATIENT
Start: 2019-10-18 | End: 2019-10-18

## 2019-10-18 RX ORDER — KETOROLAC TROMETHAMINE 30 MG/ML
60 INJECTION, SOLUTION INTRAMUSCULAR; INTRAVENOUS ONCE
Status: COMPLETED | OUTPATIENT
Start: 2019-10-18 | End: 2019-10-18

## 2019-10-18 RX ADMIN — KETOROLAC TROMETHAMINE 60 MG: 30 INJECTION, SOLUTION INTRAMUSCULAR; INTRAVENOUS at 11:25

## 2019-10-18 RX ADMIN — PROCHLORPERAZINE EDISYLATE 10 MG: 5 INJECTION INTRAMUSCULAR; INTRAVENOUS at 11:24

## 2019-10-18 NOTE — PROGRESS NOTES
Mendel Dionisio 66 y o  male MRN: [id number]    Encounter: [id number]      Assessment/Plan     Problem List Items Addressed This Visit        Endocrine    Type 2 diabetes mellitus with diabetic neuropathy, with long-term current use of insulin ([address] ) - Primary       Lab Results   Component Value Date    HGBA1C 4 9 08/10/2019   Patient currently taking tresiba once a week-I have advised him to stop and occasionally monitor blood sugars  If he notices hyperglycemia he will call the office and let us know  Otherwise he will follow-up with his primary care            Other    Hyperlipidemia     Continue statins         Vitamin D deficiency     Continue supplementations             CC: Diabetes    History of Present Illness     HPI:  26-year-old male with history of type 2 diabetes, chronic kidney disease, neuropathy here for follow-up  He underwent gastric sleeve surgery in nov 2018  and lost 145 lb   checks f s once  A day - fasting 80-90s   Takes tresiba once a week   No hypoglycemia   No polyuria , polydypsia , c/o numbness and tingling in feet and  blurry vision   Last eye exam - coming up  Hospitalized in august after a fall from orthostatic hypotension-    Review of Systems   Constitutional: Negative for unexpected weight change  Eyes: Positive for visual disturbance  Respiratory: Negative for cough and shortness of breath  Cardiovascular: Negative for palpitations and leg swelling  Gastrointestinal: Positive for nausea  Negative for constipation, diarrhea and vomiting  Endocrine: Negative for polydipsia and polyuria  Musculoskeletal: Positive for arthralgias, gait problem and myalgias  Skin: Positive for color change  Neurological: Positive for numbness  Psychiatric/Behavioral: Positive for sleep disturbance  All other systems reviewed and are negative        Historical Information   Past Medical History:   Diagnosis Date    Acute on chronic diastolic congestive heart failure ([facility] )     Altered gait     Alzheimer disease ([facility] )     per patients,,early onset     Angina pectoris ([facility] )     Anxiety     Arthritis     Brain aneurysm     coils placed    Cardiac disease     Chest pain 1/13/2016    Chronic pain     back/ right groin and rle- has morphine pump    Constipation     COPD [facility]chronic obstructive pulmonary disease) [facility]     Coronary artery disease     CPAP (continuous positive airway pressure) dependence     Decubital ulcer     sacral decub-occured 5/2019-sees wound care/debide in OR today 6/6/2019    Dependent on walker for ambulation     w/c for long distance    Depression     Diabetes mellitus (HCC)     insulin dependent    Dizziness     occ    Dysphagia     Enlarged prostate     Fall     GERD (gastroesophageal reflux disease)     Heart failure ([facility] )     Hiatal hernia     Hx of gastric bypass 11/19/2018    Hypercholesterolemia     Hypertension     MI (myocardial infarction) ([facility] )     2017- stents x2    Migraine     Morbid obesity ([facility] )     gastric bypass sleeve 11/2018-wt loss 125 lb    Neuropathy     Oxygen dependent     Q HS  2LPM with CPAP and prn during day 2-3 LPM     Renal disorder     Shortness of breath     Skin abnormality     sacral wound - covered with pad    Sleep apnea     Stented coronary artery     Stroke ([facility] )     vision loss b/l  2005, residual R leg weakness    Urinary frequency     Use of cane as ambulatory aid     Wears dentures     Wears glasses     Wears glasses     Wheelchair dependent      Past Surgical History:   Procedure Laterality Date    BACK SURGERY      BRAIN SURGERY      CARDIAC CATHETERIZATION      with stents    CEREBRAL ANEURYSM REPAIR      with coils    COLONOSCOPY      ESOPHAGOGASTRODUODENOSCOPY N/A 7/1/2016    Procedure: ESOPHAGOGASTRODUODENOSCOPY (EGD); Surgeon: Sybil Moreland MD;  [facility];   Service:     GASTRIC BYPASS  11/19/2018    HERNIA REPAIR      HIATAL HERNIA REPAIR      INFUSION PUMP IMPLANTATION Left     morphine    KNEE ARTHROSCOPY Right     KNEE ARTHROSCOPY Right     PERONEAL NERVE DECOMPRESSION Right     MS DEBRIDEMENT OPEN WOUND 20 SQ CM< N/A 6/6/2019    Procedure: EXCISIONAL DEBRIDEMENT OF SACRAL DECUBITUS ULCER;  Surgeon: Ted Vance MD;  [facility];  Service: General    MS ESOPHAGOGASTRODUODENOSCOPY TRANSORAL DIAGNOSTIC N/A 2/27/2017    Procedure: ESOPHAGOGASTRODUODENOSCOPY (EGD); Surgeon: Newton Zuñiga MD;  [facility]: [facility]; Service: Gastroenterology    MS ESOPHAGOGASTRODUODENOSCOPY TRANSORAL DIAGNOSTIC N/A 8/23/2018    Procedure: ESOPHAGOGASTRODUODENOSCOPY (EGD) with biopsy;  Surgeon: Newton Zuñiga MD;  [facility];   Service: Gastroenterology    MS INSERT SPINE INFUSN [address] [place] [address] N/A 1/19/2017    Procedure: REMOVAL / [place];  Surgeon: Jaguar Gonsales MD;  [facility];  Service: Orthopedics    MS INSERT SPINE INFUSN [address] [place] [address] N/A 5/16/2016    Procedure: REPLACEMENT AND PROGRAM PUMP ;  Surgeon: Jaguar Gonsales MD;  [facility];  Service: Orthopedics     Social History   Social History     Substance and Sexual Activity   Alcohol Use Yes    Frequency: Monthly or less    Drinks per session: 1 or 2    Binge frequency: Never    Comment: 2 times per year     Social History     Substance and Sexual Activity   Drug Use No     Social History     Tobacco Use   Smoking Status Never Smoker   Smokeless Tobacco Never Used     Family History:   Family History   Problem Relation Age of Onset    Diabetes unspecified Mother     Diabetes unspecified Brother     Diabetes unspecified Paternal Uncle     Diabetes unspecified Maternal Grandmother     Diabetes unspecified Paternal Grandmother     Diabetes unspecified Brother        Meds/Allergies   Current Outpatient Medications   Medication Sig Dispense Refill    ADVAIR DISKUS 500-50 MCG/DOSE inhaler Inhale 1 puff 2 (two) times a day  1    albuterol (ACCUNEB) 1 25 MG/3ML nebulizer solution Take 1 ampule by nebulization every 6 (six) hours as needed for wheezing      albuterol (PROVENTIL HFA,VENTOLIN HFA) 90 mcg/act inhaler Inhale 2 puffs every 6 (six) hours as needed for wheezing      AMITIZA 24 MCG capsule Take 24 mcg by mouth 2 (two) times a day with meals  3    aspirin 81 MG tablet Take 81 mg by mouth daily      atorvastatin (LIPITOR) 40 mg tablet Take 40 mg by mouth daily        baclofen 10 mg tablet Take 10 mg by mouth 3 (three) times a day      CALCIUM PO Take 1 tablet by mouth daily      Cholecalciferol (VITAMIN D3) 5000 units CAPS Take 1 capsule (5,000 Units total) by mouth daily 30 capsule 6    colchicine (COLCRYS) 0 6 mg tablet Take 1 tablet (0 6 mg total) by mouth 2 (two) times a day for 4 doses 4 tablet 0    Cyanocobalamin (VITAMIN B-12 PO) Take 1 tablet by mouth daily      ergocalciferol (VITAMIN D2) 50,000 units Take 50,000 Units by mouth once a week  0    fludrocortisone (FLORINEF) 0 1 mg tablet Take 1 tablet (0 1 mg total) by mouth daily  0    folic acid (FOLVITE) 1 mg tablet Take 1,000 mcg by mouth daily  3    gabapentin (NEURONTIN) 800 mg tablet Take 1 tablet (800 mg total) by mouth 2 (two) times a day 6 tablet 0    hydrocortisone 1 % lotion Apply 1 application topically as needed       hydrOXYzine HCL (ATARAX) 50 mg tablet Take 50 mg by mouth 2 (two) times a day   1    Linaclotide 290 MCG CAPS Take 290 mcg by mouth as needed       Methylnaltrexone Bromide (RELISTOR) 150 MG TABS Take 450 mg by mouth as needed       morphine (MS CONTIN) 60 mg 12 hr tablet Take 1 tablet (60 mg total) by mouth every 12 (twelve) hoursMax Daily Amount: 120 mg 6 tablet 0    Morphine Sulfate Microinfusion (MORPHINE SULF MICROINFUSION PF IJ) Inject 14 mg as directed daily Via infusion pump      Multiple Vitamin (MULTIVITAMIN) tablet Take 1 tablet by mouth daily       nitroglycerin (NITROSTAT) 0 4 mg SL tablet Place 0 4 mg under the tongue every 5 (five) minutes as needed for chest pain (pt has not  used recently)   omeprazole (PriLOSEC) 20 mg delayed release capsule Take 1 capsule (20 mg total) by mouth daily (Patient taking differently: Take 40 mg by mouth daily ) 30 capsule 2    oxyCODONE (ROXICODONE) 30 MG immediate release tablet Take 1 tablet (30 mg total) by mouth every 6 (six) hours as needed for moderate painMax Daily Amount: 120 mg 12 tablet 0    polyethylene glycol (GLYCOLAX) powder MIX 1 HEAPING TABLESPOON (17 G) WITH 8 OUNCES OF WATER  PREPARE AND DRINK SOLUTION ONCE DAILY  5    POTASSIUM PO Take 40 mEq by mouth 2 (two) times a day      prochlorperazine (COMPAZINE) 10 mg tablet TAKE 1 TABLET (10 MG TOTAL) BY MOUTH EVERY 6 (SIX) HOURS AS NEEDED FOR NAUSEA OR VOMITING   0    sertraline (ZOLOFT) 25 mg tablet Take 25 mg by mouth daily      tamsulosin (FLOMAX) 0 4 mg Take 0 4 mg by mouth daily with dinner   traZODone (DESYREL) 100 mg tablet Take 1 tablet (100 mg total) by mouth daily at bedtime 4 tablet 0    ULTICARE SHORT PEN NEEDLES 31G X 8 MM MISC 4 INJECTIONS PER DAY DX  E11 8  1    zolpidem (AMBIEN) 10 mg tablet Take 10 mg by mouth daily at bedtime as needed for sleep       No current facility-administered medications for this visit  No Known Allergies    Objective   Vitals: Blood pressure 110/70, pulse 66, height 6' (1 829 m), weight 108 kg (237 lb 6 4 oz)  Physical Exam   Constitutional: He is oriented to person, place, and time  He appears well-developed and well-nourished  No distress  HENT:   Head: Normocephalic and atraumatic  Eyes: EOM are normal  No scleral icterus  Neck: Normal range of motion  Neck supple  No thyromegaly present  Cardiovascular: Normal rate, regular rhythm and normal heart sounds  No murmur heard  Pulmonary/Chest: Effort normal and breath sounds normal  No respiratory distress  Abdominal: Soft  Bowel sounds are normal  He exhibits no distension  There is no tenderness   There is no guarding  Musculoskeletal: Normal range of motion  He exhibits no edema or deformity  Neurological: He is alert and oriented to person, place, and time  Skin: Skin is warm and dry  Psychiatric: He has a normal mood and affect  His behavior is normal  Thought content normal        The history was obtained from the review of the chart, patient      Lab Results:   Lab Results   Component Value Date/Time    Hemoglobin A1C 4 9 08/10/2019 05:41 AM    WBC 9 12 08/10/2019 05:41 AM    WBC 8 47 08/09/2019 05:29 AM    WBC 11 23 (H) 08/08/2019 04:26 PM    Hemoglobin 11 3 (L) 08/10/2019 05:41 AM    Hemoglobin 11 4 (L) 08/09/2019 05:29 AM    Hemoglobin 12 6 08/08/2019 04:26 PM    Hematocrit 37 1 08/10/2019 05:41 AM    Hematocrit 37 2 08/09/2019 05:29 AM    Hematocrit 41 4 08/08/2019 04:26 PM    MCV 93 08/10/2019 05:41 AM    MCV 94 08/09/2019 05:29 AM    MCV 94 08/08/2019 04:26 PM    Platelets 287 99/91/4369 05:41 AM    Platelets 510 78/09/6356 05:29 AM    Platelets 349 36/73/0458 04:26 PM    BUN 9 08/10/2019 05:41 AM    BUN 7 08/09/2019 05:29 AM    BUN 8 08/08/2019 04:26 PM    Potassium 4 0 08/10/2019 05:41 AM    Potassium 4 3 08/09/2019 05:29 AM    Potassium 4 8 08/08/2019 04:26 PM    Chloride 109 (H) 08/10/2019 05:41 AM    Chloride 110 (H) 08/09/2019 05:29 AM    Chloride 108 08/08/2019 04:26 PM    CO2 27 08/10/2019 05:41 AM    CO2 24 08/09/2019 05:29 AM    CO2 28 08/08/2019 04:26 PM    Creatinine 0 89 08/10/2019 05:41 AM    Creatinine 0 84 08/09/2019 05:29 AM    Creatinine 1 05 08/08/2019 04:26 PM    AST 10 08/10/2019 05:41 AM    AST 13 08/08/2019 04:26 PM    AST 13 06/17/2019 08:50 AM    ALT 9 (L) 08/10/2019 05:41 AM    ALT 17 08/08/2019 04:26 PM    ALT 13 06/17/2019 08:50 AM    Albumin 2 4 (L) 08/10/2019 05:41 AM    Albumin 2 8 (L) 08/08/2019 04:26 PM    Albumin 2 2 (L) 06/17/2019 08:50 AM         October 15, 2019-hemoglobin A1c 5 3, fasting glucose 94        Portions of the record may have been created with voice recognition software  Occasional wrong word or "sound a like" substitutions may have occurred due to the inherent limitations of voice recognition software  Read the chart carefully and recognize, using context, where substitutions have occurred

## 2019-10-18 NOTE — PATIENT INSTRUCTIONS
Continue botox every 3 months  Continue all your other medications per other providers  Restart Namenda 10 mg at bedtime for 14 days then increase to 2 times a day    At onset of migraine, take prochlorperazine 10 mg and gabapentin 300 mg  Neck pain:   - We discussed the role of neck pathology and poor posture, with straightening of the normal cervical lordosis, in headaches  We discussed how tightening of the neck muscles can irritate the nerves in the occipital region of her head and cause or worsen head pain  We also discussed and demonstrated neck strengthening and relaxation exercises, as well as giving written instructions on these exercises  - We talked about the importance of good posture for improving shoulder, neck, and head pain  The patient was given visualization exercises for correcting posture, which patient will practice at home  If this simple exercise does not help improve the posture, we will consider formal physical therapy in the future  Medication overuse headaches:   - We discussed medication overuse headache [facility]) and how to avoid it in the future  It was explained that all analgesics have the potential to cause medication overuse headache [facility]) and analgesic overuse can negate the effectiveness of headache preventive measures  After successful [address] treatment, preventive medications for an underlying primary headache disorder have a greater chance for success  Avoid medications with narcotics, barbiturates, or caffeine in them as these can cause rebound headaches after very few doses and can interfere with other headache medicine efficacy  Taking any analgesics for more than 2-3 days a week can cause medication overuse headache        [place] Prescription Drug Monitoring Program report was reviewed and was appropriate      Headache management instructions  - When patient has a moderate to severe headache, they should seek rest, initiate relaxation and apply cold compresses to the head  - Maintain regular sleep schedule  Adults need at least 7-8 hours of uninterrupted a night  - Limit over the counter medications such as Tylenol, Ibuprofen, Aleve, Excedrin  (No more than 3 times a week)  - Maintain headache diary  We discussed an JANICE for a smart phone is "Migraine minal"  - Limit caffeine to 1-2 cups 8 to 16 oz a day or less  - Avoid dietary trigger  (aged cheese, peanuts, MSG, aspartame and nitrates)  - Patient is to have regular frequent meals to prevent headache onset  - Please drink at least 64 ounces of water a day to help remain hydrated  Please call with any questions or concerns   Office number is [phone]

## 2019-10-18 NOTE — ASSESSMENT & PLAN NOTE
Continue botox every 3 months  Continue all your other medications per other providers  Restart Namenda 10 mg at bedtime for 14 days then increase to 2 times a day    At onset of migraine, take prochlorperazine 10 mg and gabapentin 300 mg

## 2019-10-18 NOTE — ASSESSMENT & PLAN NOTE
Patient's Namenda was stopped in the hospital   States he feels he is worsening  Therefore, we will restart 10 mg at bedtime for 14 days then BID    If no improvement we can always add Aricept

## 2019-10-18 NOTE — ASSESSMENT & PLAN NOTE
Continue to follow with psychiatry    May need adjustment in his Zoloft as appears more depressed today than usual

## 2019-10-18 NOTE — ASSESSMENT & PLAN NOTE
Lab Results   Component Value Date    HGBA1C 4 9 08/10/2019   Patient currently taking tresiba once a week-I have advised him to stop and occasionally monitor blood sugars  If he notices hyperglycemia he will call the office and let us know    Otherwise he will follow-up with his primary care

## 2019-10-18 NOTE — ASSESSMENT & PLAN NOTE
Patient states he is having issues with his recent decrease in pain medications and pain pump  He states that he is withdrawing from this

## 2019-11-08 DIAGNOSIS — G43.709 CHRONIC MIGRAINE WITHOUT AURA WITHOUT STATUS MIGRAINOSUS, NOT INTRACTABLE: ICD-10-CM

## 2019-11-08 RX ORDER — GABAPENTIN 300 MG/1
300 CAPSULE ORAL AS NEEDED
Qty: 30 CAPSULE | Refills: 0 | Status: SHIPPED | OUTPATIENT
Start: 2019-11-08

## 2019-11-11 ENCOUNTER — PATIENT OUTREACH (OUTPATIENT)
Dept: CASE MANAGEMENT | Facility: OTHER | Age: 59
End: 2019-11-11

## 2019-11-11 NOTE — PROGRESS NOTES
Bundle program ended 11/10/19  BPCI form updated, care coordination note removed and bundle episode resolved  Inbasket message to ROMERO Navarro

## 2019-12-16 ENCOUNTER — DOCUMENTATION (OUTPATIENT)
Dept: NEUROLOGY | Facility: CLINIC | Age: 59
End: 2019-12-16

## 2019-12-16 NOTE — PROGRESS NOTES
Type Date User Summary Attachment   General 12/13/2019 10:53 AM Estelita Schroeder care coordination  -   Note    Botox- no authorization needed   Please use our stock      Thank you,     Bambi Garcia

## 2019-12-20 ENCOUNTER — TRANSCRIBE ORDERS (OUTPATIENT)
Dept: ADMINISTRATIVE | Facility: HOSPITAL | Age: 59
End: 2019-12-20

## 2019-12-20 DIAGNOSIS — Z46.6 ENCOUNTER FOR URETERAL CATHETER PLACEMENT: Primary | ICD-10-CM

## 2020-01-28 ENCOUNTER — TELEPHONE (OUTPATIENT)
Dept: NEUROLOGY | Facility: CLINIC | Age: 60
End: 2020-01-28

## 2020-01-28 ENCOUNTER — HOSPITAL ENCOUNTER (EMERGENCY)
Facility: HOSPITAL | Age: 60
End: 2020-01-28
Attending: EMERGENCY MEDICINE | Admitting: EMERGENCY MEDICINE
Payer: MEDICARE

## 2020-01-28 ENCOUNTER — OFFICE VISIT (OUTPATIENT)
Dept: NEUROLOGY | Facility: CLINIC | Age: 60
End: 2020-01-28
Payer: MEDICARE

## 2020-01-28 VITALS
OXYGEN SATURATION: 96 % | HEIGHT: 72 IN | RESPIRATION RATE: 24 BRPM | SYSTOLIC BLOOD PRESSURE: 141 MMHG | TEMPERATURE: 98.3 F | BODY MASS INDEX: 30.75 KG/M2 | WEIGHT: 227 LBS | HEART RATE: 64 BPM | DIASTOLIC BLOOD PRESSURE: 93 MMHG

## 2020-01-28 VITALS
DIASTOLIC BLOOD PRESSURE: 72 MMHG | WEIGHT: 227 LBS | SYSTOLIC BLOOD PRESSURE: 154 MMHG | HEART RATE: 78 BPM | HEIGHT: 72 IN | BODY MASS INDEX: 30.75 KG/M2

## 2020-01-28 DIAGNOSIS — F32.A DEPRESSION, UNSPECIFIED DEPRESSION TYPE: Primary | ICD-10-CM

## 2020-01-28 DIAGNOSIS — R45.851 DEPRESSION WITH SUICIDAL IDEATION: ICD-10-CM

## 2020-01-28 DIAGNOSIS — G43.709 CHRONIC MIGRAINE WITHOUT AURA WITHOUT STATUS MIGRAINOSUS, NOT INTRACTABLE: ICD-10-CM

## 2020-01-28 DIAGNOSIS — F32.A DEPRESSION WITH SUICIDAL IDEATION: ICD-10-CM

## 2020-01-28 DIAGNOSIS — R45.851 SUICIDAL IDEATIONS: Primary | ICD-10-CM

## 2020-01-28 LAB
ALBUMIN SERPL BCP-MCNC: 3.3 G/DL (ref 3.5–5)
ALP SERPL-CCNC: 96 U/L (ref 46–116)
ALT SERPL W P-5'-P-CCNC: 17 U/L (ref 12–78)
AMPHETAMINES SERPL QL SCN: NEGATIVE
ANION GAP SERPL CALCULATED.3IONS-SCNC: 8 MMOL/L (ref 4–13)
AST SERPL W P-5'-P-CCNC: 15 U/L (ref 5–45)
BACTERIA UR QL AUTO: NORMAL /HPF
BARBITURATES UR QL: NEGATIVE
BASOPHILS # BLD AUTO: 0.05 THOUSANDS/ΜL (ref 0–0.1)
BASOPHILS NFR BLD AUTO: 1 % (ref 0–1)
BENZODIAZ UR QL: NEGATIVE
BILIRUB SERPL-MCNC: 0.5 MG/DL (ref 0.2–1)
BILIRUB UR QL STRIP: NEGATIVE
BUN SERPL-MCNC: 9 MG/DL (ref 5–25)
CALCIUM SERPL-MCNC: 8.9 MG/DL (ref 8.3–10.1)
CHLORIDE SERPL-SCNC: 108 MMOL/L (ref 100–108)
CLARITY UR: CLEAR
CO2 SERPL-SCNC: 26 MMOL/L (ref 21–32)
COCAINE UR QL: NEGATIVE
COLOR UR: YELLOW
CREAT SERPL-MCNC: 1.08 MG/DL (ref 0.6–1.3)
EOSINOPHIL # BLD AUTO: 0.38 THOUSAND/ΜL (ref 0–0.61)
EOSINOPHIL NFR BLD AUTO: 4 % (ref 0–6)
ERYTHROCYTE [DISTWIDTH] IN BLOOD BY AUTOMATED COUNT: 13.5 % (ref 11.6–15.1)
ETHANOL EXG-MCNC: 0 MG/DL
GFR SERPL CREATININE-BSD FRML MDRD: 76 ML/MIN/1.73SQ M
GLUCOSE SERPL-MCNC: 96 MG/DL (ref 65–140)
GLUCOSE UR STRIP-MCNC: NEGATIVE MG/DL
HCT VFR BLD AUTO: 46.7 % (ref 36.5–49.3)
HGB BLD-MCNC: 15.3 G/DL (ref 12–17)
HGB UR QL STRIP.AUTO: NEGATIVE
IMM GRANULOCYTES # BLD AUTO: 0.07 THOUSAND/UL (ref 0–0.2)
IMM GRANULOCYTES NFR BLD AUTO: 1 % (ref 0–2)
KETONES UR STRIP-MCNC: NEGATIVE MG/DL
LEUKOCYTE ESTERASE UR QL STRIP: NEGATIVE
LYMPHOCYTES # BLD AUTO: 1.66 THOUSANDS/ΜL (ref 0.6–4.47)
LYMPHOCYTES NFR BLD AUTO: 18 % (ref 14–44)
MCH RBC QN AUTO: 29.7 PG (ref 26.8–34.3)
MCHC RBC AUTO-ENTMCNC: 32.8 G/DL (ref 31.4–37.4)
MCV RBC AUTO: 91 FL (ref 82–98)
METHADONE UR QL: NEGATIVE
MONOCYTES # BLD AUTO: 0.53 THOUSAND/ΜL (ref 0.17–1.22)
MONOCYTES NFR BLD AUTO: 6 % (ref 4–12)
NEUTROPHILS # BLD AUTO: 6.37 THOUSANDS/ΜL (ref 1.85–7.62)
NEUTS SEG NFR BLD AUTO: 70 % (ref 43–75)
NITRITE UR QL STRIP: NEGATIVE
NON-SQ EPI CELLS URNS QL MICRO: NORMAL /HPF
NRBC BLD AUTO-RTO: 0 /100 WBCS
OPIATES UR QL SCN: POSITIVE
PCP UR QL: NEGATIVE
PH UR STRIP.AUTO: 7 [PH]
PLATELET # BLD AUTO: 203 THOUSANDS/UL (ref 149–390)
PMV BLD AUTO: 10.4 FL (ref 8.9–12.7)
POTASSIUM SERPL-SCNC: 4.4 MMOL/L (ref 3.5–5.3)
PROT SERPL-MCNC: 6.8 G/DL (ref 6.4–8.2)
PROT UR STRIP-MCNC: ABNORMAL MG/DL
RBC # BLD AUTO: 5.16 MILLION/UL (ref 3.88–5.62)
RBC #/AREA URNS AUTO: NORMAL /HPF
SODIUM SERPL-SCNC: 142 MMOL/L (ref 136–145)
SP GR UR STRIP.AUTO: 1.02 (ref 1–1.03)
THC UR QL: NEGATIVE
TSH SERPL DL<=0.05 MIU/L-ACNC: 2.32 UIU/ML (ref 0.36–3.74)
UROBILINOGEN UR QL STRIP.AUTO: 2 E.U./DL
WBC # BLD AUTO: 9.06 THOUSAND/UL (ref 4.31–10.16)
WBC #/AREA URNS AUTO: NORMAL /HPF

## 2020-01-28 PROCEDURE — 80307 DRUG TEST PRSMV CHEM ANLYZR: CPT | Performed by: PHYSICIAN ASSISTANT

## 2020-01-28 PROCEDURE — 36415 COLL VENOUS BLD VENIPUNCTURE: CPT | Performed by: PHYSICIAN ASSISTANT

## 2020-01-28 PROCEDURE — 93005 ELECTROCARDIOGRAM TRACING: CPT

## 2020-01-28 PROCEDURE — 99285 EMERGENCY DEPT VISIT HI MDM: CPT | Performed by: PHYSICIAN ASSISTANT

## 2020-01-28 PROCEDURE — 80053 COMPREHEN METABOLIC PANEL: CPT | Performed by: PHYSICIAN ASSISTANT

## 2020-01-28 PROCEDURE — 99215 OFFICE O/P EST HI 40 MIN: CPT | Performed by: PHYSICIAN ASSISTANT

## 2020-01-28 PROCEDURE — 82075 ASSAY OF BREATH ETHANOL: CPT | Performed by: PHYSICIAN ASSISTANT

## 2020-01-28 PROCEDURE — 81001 URINALYSIS AUTO W/SCOPE: CPT | Performed by: PHYSICIAN ASSISTANT

## 2020-01-28 PROCEDURE — 99285 EMERGENCY DEPT VISIT HI MDM: CPT

## 2020-01-28 PROCEDURE — 84443 ASSAY THYROID STIM HORMONE: CPT | Performed by: PHYSICIAN ASSISTANT

## 2020-01-28 PROCEDURE — 85025 COMPLETE CBC W/AUTO DIFF WBC: CPT | Performed by: PHYSICIAN ASSISTANT

## 2020-01-28 RX ORDER — NYSTATIN 100000 U/G
1 CREAM TOPICAL 2 TIMES DAILY
COMMUNITY
Start: 2020-01-11

## 2020-01-28 RX ORDER — RISPERIDONE 1 MG/1
1 TABLET, ORALLY DISINTEGRATING ORAL EVERY 8 HOURS PRN
Status: CANCELLED | OUTPATIENT
Start: 2020-01-28

## 2020-01-28 RX ORDER — HYDROCODONE BITARTRATE AND ACETAMINOPHEN 5; 325 MG/1; MG/1
1 TABLET ORAL ONCE
Status: DISCONTINUED | OUTPATIENT
Start: 2020-01-28 | End: 2020-01-28

## 2020-01-28 RX ORDER — SERTRALINE HYDROCHLORIDE 100 MG/1
150 TABLET, FILM COATED ORAL DAILY
COMMUNITY
Start: 2020-01-19 | End: 2020-01-31 | Stop reason: HOSPADM

## 2020-01-28 RX ORDER — OXYCODONE HYDROCHLORIDE 5 MG/1
5 TABLET ORAL ONCE
Status: COMPLETED | OUTPATIENT
Start: 2020-01-28 | End: 2020-01-28

## 2020-01-28 RX ORDER — OLANZAPINE 10 MG/1
10 INJECTION, POWDER, LYOPHILIZED, FOR SOLUTION INTRAMUSCULAR 2 TIMES DAILY PRN
Status: CANCELLED | OUTPATIENT
Start: 2020-01-28

## 2020-01-28 RX ORDER — TRAZODONE HYDROCHLORIDE 50 MG/1
25 TABLET ORAL
Status: CANCELLED | OUTPATIENT
Start: 2020-01-28

## 2020-01-28 RX ORDER — HYDROXYZINE HYDROCHLORIDE 25 MG/1
TABLET, FILM COATED ORAL
Status: ON HOLD | COMMUNITY
Start: 2020-01-14 | End: 2020-01-31 | Stop reason: CLARIF

## 2020-01-28 RX ORDER — HYDROCODONE BITARTRATE AND ACETAMINOPHEN 5; 325 MG/1; MG/1
1 TABLET ORAL ONCE
Status: COMPLETED | OUTPATIENT
Start: 2020-01-28 | End: 2020-01-28

## 2020-01-28 RX ORDER — PEDIATRIC MULTIPLE VITAMIN W/ MINERALS & C CHEW TAB 60 MG 12 MG
1 CHEW TAB ORAL DAILY
COMMUNITY
Start: 2020-01-09 | End: 2020-05-06

## 2020-01-28 RX ORDER — BENZTROPINE MESYLATE 1 MG/ML
1 INJECTION INTRAMUSCULAR; INTRAVENOUS EVERY 6 HOURS PRN
Status: CANCELLED | OUTPATIENT
Start: 2020-01-28

## 2020-01-28 RX ORDER — MAGNESIUM HYDROXIDE/ALUMINUM HYDROXICE/SIMETHICONE 120; 1200; 1200 MG/30ML; MG/30ML; MG/30ML
30 SUSPENSION ORAL EVERY 4 HOURS PRN
Status: CANCELLED | OUTPATIENT
Start: 2020-01-28

## 2020-01-28 RX ORDER — GABAPENTIN 600 MG/1
600 TABLET ORAL 3 TIMES DAILY
COMMUNITY
Start: 2020-01-09

## 2020-01-28 RX ORDER — OLANZAPINE 10 MG/1
10 INJECTION, POWDER, LYOPHILIZED, FOR SOLUTION INTRAMUSCULAR ONCE
Status: DISCONTINUED | OUTPATIENT
Start: 2020-01-28 | End: 2020-01-29 | Stop reason: HOSPADM

## 2020-01-28 RX ORDER — HALOPERIDOL 5 MG
5 TABLET ORAL EVERY 8 HOURS PRN
Status: CANCELLED | OUTPATIENT
Start: 2020-01-28

## 2020-01-28 RX ORDER — POTASSIUM CHLORIDE 20 MEQ/1
TABLET, EXTENDED RELEASE ORAL
Status: ON HOLD | COMMUNITY
Start: 2020-01-28 | End: 2020-01-31 | Stop reason: SDUPTHER

## 2020-01-28 RX ORDER — LORAZEPAM 1 MG/1
1 TABLET ORAL EVERY 4 HOURS PRN
Status: CANCELLED | OUTPATIENT
Start: 2020-01-28

## 2020-01-28 RX ORDER — CLOPIDOGREL BISULFATE 75 MG/1
75 TABLET ORAL DAILY
COMMUNITY
Start: 2020-01-09 | End: 2020-07-09 | Stop reason: HOSPADM

## 2020-01-28 RX ORDER — ACETAMINOPHEN 325 MG/1
650 TABLET ORAL EVERY 4 HOURS PRN
Status: CANCELLED | OUTPATIENT
Start: 2020-01-28

## 2020-01-28 RX ORDER — BENZTROPINE MESYLATE 0.5 MG/1
1 TABLET ORAL EVERY 6 HOURS PRN
Status: CANCELLED | OUTPATIENT
Start: 2020-01-28

## 2020-01-28 RX ADMIN — OXYCODONE HYDROCHLORIDE 5 MG: 5 TABLET ORAL at 19:13

## 2020-01-28 RX ADMIN — HYDROCODONE BITARTRATE AND ACETAMINOPHEN 1 TABLET: 5; 325 TABLET ORAL at 14:17
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
## 2020-01-28 NOTE — ED NOTES
Pt has Medicare A&B as primary  No precert required  Secondary is [facility]   Spoke with Nicko Richard at  [facility] to complete the COB

## 2020-01-28 NOTE — TELEPHONE ENCOUNTER
MSW was approached by Mercedes Dallas and Medical Assistant Ana Maria Chow and was informed that the patient was experiencing severe suicidal ideations  He expressed to both [place] that he wanted to die, that he felt worthless, and that nobody cares about him  He told Klarissa that he wanted to drive in front of a tracker trailer on his was home so that he could die  MSW went to speak with the patient to assess the situation and he continued to make detailed suicidal statements  The patient expressed the following suicidal statements to MSW:  1  "I should leave the office right now and just drive in front of a truck so that I can die"   2  "If I die, my family will get a lot of money because I have life insurance "  3  "I have no reason to live because I am worthless"  4  "If I die nobody would care anyway"     MSW affirmed the patient and discussed that it would be best for him to go to the emergency room for a psych evaluation  The patient was agreeable but requested that MSW not call his family (including his spouse) to take him  MSRAI then asked if there was anyone that he felt comfortable with MSW calling so that somone in his family could know what is going on  The patient identified his daughter Lc Ford, her phone number is [phone]  MSRAI then explained to the patient that MSW would call 911 and have EMS transport him to the hospital for a psych evaluation  MSW explained that from that point on, the doctors in the hospital would determine the next steps of getting him the help that he needs  MSW told the patient that MSW would call the patient's daughter to let her know what is going on  The patient was agreeable  MSW asked the patient if he felt comfortable having somebody sit with him while MSW made those phone call  He stated that he was okay with that  MSRAI then asked Carmina Ordoñez to sit in the room with him while the calls were made       MSW called 911 to explain the situation and ask for an ambulance to transport  MSW explained the situation to the dispatch representative and they then sent the responding team  After speaking with the dispatcher, MSW called the patient's daughter to inform her of what was going on  She was concerned about her father but felt that she was okay to handle everything  She stated that she would plan to meet him at the hospital that he was being transported to  After speaking with her  the Emergency Response team arrived and the patient went with them willingly  MSW asked them what hospital he would be transferred to and the EMT worker informed MSW that they would be taking him to [address] [place] [address]  After the patient left, MSW called the patient's daughter to inform her of the location the patient was being sent to  She stated that she was going to meet him there  MSW then called [address] [place] [address] to report the incident  The representative stated that they would call the hospital to learn about the outcome of the patient  MSW also provided them with the patient's daughter's phone number so that they could call her if needed  The crisis worker informed MSW that they would call MSW to let MSW know about the patient's outcome

## 2020-01-28 NOTE — PROGRESS NOTES
Review of Systems   Constitutional: Negative  HENT: Positive for trouble swallowing  Eyes: Positive for photophobia and visual disturbance (blurry vision )  Respiratory: Negative  Cardiovascular: Positive for chest pain  Gastrointestinal: Positive for nausea  Endocrine: Negative  Genitourinary: Negative  Musculoskeletal: Positive for gait problem (ambulates with roller walker )  Skin: Positive for wound (butt )  Allergic/Immunologic: Negative  Neurological: Positive for dizziness, light-headedness, numbness (bilateral feet ) and headaches  Hematological: Negative  Psychiatric/Behavioral: Positive for confusion, sleep disturbance (diffuclty staying alseep ) and suicidal ideas  The patient is nervous/anxious           Depressed

## 2020-01-28 NOTE — ED NOTES
Pt has Medicare Part A&B (No Pre-Cert Needed) as his primary insurance  CW EVS'd pt and per EVS pt is MA eligible - [facility] as his secondary insurance       [address] [place] [address]

## 2020-01-28 NOTE — ED PROVIDER NOTES
History  Chief Complaint   Patient presents with    Suicidal     pt presents to ED via EMS from his doctors office for SI  Pt states he will run a red light and crash into a car  57-year-old male with history of insulin-dependent diabetes, congestive heart failure, GERD memory issues, COPD coronary artery disease, chronic pain secondary to neuropathy, and prior stroke in 2005 presents the emergency department for evaluation of suicidal ideations  Patient was evaluated today for memory issues by his neurologist at which time he indicated the desire to commit suicide  He states that he would jump in front of a tractor trailer or possibly overdose on his medications  He reiterates to me that he would like to take his medications and not wake up  He is vague about any history of suicidal attempt  States repeatedly to me that he refuses to sign himself in and you will have to commit me if he will need to go anywhere"  He is quite agitated on initial exam and initially refuses to give up any of his belongings  He denies any hallucinations, homicidal ideations, access to weapons at home, or illicit substance use  Prior to Admission Medications   Prescriptions Last Dose Informant Patient Reported? Taking?    ADVAIR DISKUS 500-50 MCG/DOSE inhaler  Self Yes No   Sig: Inhale 1 puff 2 (two) times a day   AMITIZA 24 MCG capsule  Self Yes No   Sig: Take 24 mcg by mouth 2 (two) times a day with meals   CALCIUM PO  Self Yes No   Sig: Take 1 tablet by mouth daily   Cholecalciferol (VITAMIN D3) 5000 units CAPS  Self No No   Sig: Take 1 capsule (5,000 Units total) by mouth daily   Cyanocobalamin (VITAMIN B-12 PO)  Self Yes No   Sig: Take 1 tablet by mouth daily   Linaclotide 290 MCG CAPS  Self Yes No   Sig: Take 290 mcg by mouth as needed    Methylnaltrexone Bromide (RELISTOR) 150 MG TABS  Self Yes No   Sig: Take 450 mg by mouth as needed    Morphine Sulfate Microinfusion (MORPHINE SULF MICROINFUSION PF IJ) Self Yes No   Sig: Inject 14 mg as directed daily Via infusion pump   Multiple Vitamin (MULTIVITAMIN) tablet  Self Yes No   Sig: Take 1 tablet by mouth daily    POTASSIUM PO  Self Yes No   Sig: Take 40 mEq by mouth 2 (two) times a day   Pediatric Multivit-Minerals-C (POLYVITAMIN/IRON) CHEW  Self Yes No   Sig: Chew 1 tablet daily   ULTICARE SHORT PEN NEEDLES 31G X 8 MM MISC  Self Yes No   Si INJECTIONS PER DAY DX  E11 8   albuterol (ACCUNEB) 1 25 MG/3ML nebulizer solution  Self Yes No   Sig: Take 1 ampule by nebulization every 6 (six) hours as needed for wheezing   albuterol (PROVENTIL HFA,VENTOLIN HFA) 90 mcg/act inhaler  Self Yes No   Sig: Inhale 2 puffs every 6 (six) hours as needed for wheezing   aspirin 81 MG tablet  Self Yes No   Sig: Take 81 mg by mouth daily   atorvastatin (LIPITOR) 40 mg tablet  Self Yes No   Sig: Take 40 mg by mouth daily  baclofen 10 mg tablet  Self Yes No   Sig: Take 10 mg by mouth 3 (three) times a day   clopidogrel (PLAVIX) 75 mg tablet  Self Yes No   Sig: Take 75 mg by mouth daily   colchicine (COLCRYS) 0 6 mg tablet   No No   Sig: Take 1 tablet (0 6 mg total) by mouth 2 (two) times a day for 4 doses   ergocalciferol (VITAMIN D2) 50,000 units  Self Yes No   Sig: Take 50,000 Units by mouth once a week   fludrocortisone (FLORINEF) 0 1 mg tablet  Self No No   Sig: Take 1 tablet (0 1 mg total) by mouth daily   folic acid (FOLVITE) 1 mg tablet  Self Yes No   Sig: Take 1 mg by mouth daily    gabapentin (NEURONTIN) 300 mg capsule  Self No No   Sig: TAKE 1 CAPSULE (300 MG TOTAL) BY MOUTH AS NEEDED (MIGRAINE)   gabapentin (NEURONTIN) 600 MG tablet  Self Yes No   Sig: Take 600 mg by mouth 3 (three) times a day   gabapentin (NEURONTIN) 800 mg tablet  Self No No   Sig: Take 1 tablet (800 mg total) by mouth 2 (two) times a day   hydrOXYzine HCL (ATARAX) 25 mg tablet  Self Yes No   Sig: TAKE 3 TABLETS (75 MG TOTAL) BY MOUTH 2 (TWO) TIMES A DAY AS NEEDED FOR ANXIETY     hydrOXYzine HCL (ATARAX) 50 mg tablet  Self Yes No   Sig: Take 50 mg by mouth 2 (two) times a day    hydrocortisone 1 % lotion  Self Yes No   Sig: Apply 1 application topically as needed    memantine (NAMENDA) 10 mg tablet  Self No No   Sig: Take 1 tablet (10 mg total) by mouth 2 (two) times a day First 14 days just bedtime   morphine (MS CONTIN) 60 mg 12 hr tablet  Self No No   Sig: Take 1 tablet (60 mg total) by mouth every 12 (twelve) hoursMax Daily Amount: 120 mg   Patient not taking: Reported on 1/28/2020   nitroglycerin (NITROSTAT) 0 4 mg SL tablet  Self Yes No   Sig: Place 0 4 mg under the tongue every 5 (five) minutes as needed for chest pain (pt has not  used recently)  nystatin (MYCOSTATIN) cream  Self Yes No   Sig: Apply 1 application topically 2 (two) times a day   omeprazole (PriLOSEC) 20 mg delayed release capsule  Self No No   Sig: Take 1 capsule (20 mg total) by mouth daily   Patient taking differently: Take 40 mg by mouth daily    oxyCODONE (ROXICODONE) 30 MG immediate release tablet  Self No No   Sig: Take 1 tablet (30 mg total) by mouth every 6 (six) hours as needed for moderate painMax Daily Amount: 120 mg   Patient not taking: Reported on 1/28/2020   polyethylene glycol (GLYCOLAX) powder  Self Yes No   Sig: MIX 1 HEAPING TABLESPOON (17 G) WITH 8 OUNCES OF WATER  PREPARE AND DRINK SOLUTION ONCE DAILY  potassium chloride (K-DUR,KLOR-CON) 20 mEq tablet  Self Yes No   prochlorperazine (COMPAZINE) 10 mg tablet  Self No No   Sig: Take 1 tablet (10 mg total) by mouth every 6 (six) hours as needed for nausea or vomiting (migraine)   sertraline (ZOLOFT) 100 mg tablet  Self Yes No   Sig: Take 150 mg by mouth daily   sertraline (ZOLOFT) 25 mg tablet  Self Yes No   Sig: Take 25 mg by mouth daily   tamsulosin (FLOMAX) 0 4 mg  Self Yes No   Sig: Take 0 4 mg by mouth daily with dinner     traZODone (DESYREL) 100 mg tablet  Self No No   Sig: Take 1 tablet (100 mg total) by mouth daily at bedtime   zolpidem (AMBIEN) 10 mg tablet Self Yes No   Sig: Take 10 mg by mouth daily at bedtime as needed for sleep      Facility-Administered Medications: None       Past Medical History:   Diagnosis Date    Acute on chronic diastolic congestive heart failure (HCC)     Altered gait     Alzheimer disease ([facility]     per patients,,early onset     Angina pectoris [facility]     Anxiety     Arthritis     Brain aneurysm     coils placed    Cardiac disease     Chest pain 1/13/2016    Chronic pain     back/ right groin and rle- has morphine pump    Constipation     COPD [facility]chronic obstructive pulmonary disease) ([facility]     Coronary artery disease     CPAP (continuous positive airway pressure) dependence     Decubital ulcer     sacral decub-occured 5/2019-sees wound care/debide in OR today 6/6/2019    Dependent on walker for ambulation     w/c for long distance    Depression     Diabetes mellitus ([facility])     insulin dependent    Dizziness     occ    Dysphagia     Enlarged prostate     Fall     GERD (gastroesophageal reflux disease)     Heart failure ([facility]     Hiatal hernia     Hx of gastric bypass 11/19/2018    Hypercholesterolemia     Hypertension     MI (myocardial infarction) ([facility]     2017- stents x2    Migraine     Morbid obesity ([facility] )     gastric bypass sleeve 11/2018-wt loss 125 lb    Neuropathy     Oxygen dependent     Q HS  2LPM with CPAP and prn during day 2-3 LPM     Renal disorder     Shortness of breath     Skin abnormality     sacral wound - covered with pad    Sleep apnea     Stented coronary artery     Stroke ([facility]     vision loss b/l  2005, residual R leg weakness    Urinary frequency     Use of cane as ambulatory aid     Wears dentures     Wears glasses     Wears glasses     Wheelchair dependent        Past Surgical History:   Procedure Laterality Date    BACK SURGERY      BRAIN SURGERY      CARDIAC CATHETERIZATION      with stents    CEREBRAL ANEURYSM REPAIR      with coils    COLONOSCOPY      ESOPHAGOGASTRODUODENOSCOPY N/A 7/1/2016    Procedure: ESOPHAGOGASTRODUODENOSCOPY (EGD); Surgeon: Jenifer Roach MD;  [facility]; Service:     GASTRIC BYPASS  11/19/2018    HERNIA REPAIR      HIATAL HERNIA REPAIR      INFUSION PUMP IMPLANTATION Left     morphine    KNEE ARTHROSCOPY Right     KNEE ARTHROSCOPY Right     PERONEAL NERVE DECOMPRESSION Right     WI DEBRIDEMENT OPEN WOUND 20 SQ CM< N/A 6/6/2019    Procedure: EXCISIONAL DEBRIDEMENT OF SACRAL DECUBITUS ULCER;  Surgeon: Shayy Hines MD;  [facility];  Service: General    WI ESOPHAGOGASTRODUODENOSCOPY TRANSORAL DIAGNOSTIC N/A 2/27/2017    Procedure: ESOPHAGOGASTRODUODENOSCOPY (EGD); Surgeon: Jenifer Roach MD;  [facility]; Service: Gastroenterology    WI ESOPHAGOGASTRODUODENOSCOPY TRANSORAL DIAGNOSTIC N/A 8/23/2018    Procedure: ESOPHAGOGASTRODUODENOSCOPY (EGD) with biopsy;  Surgeon: Jenifer Roach MD;  [facility]; Service: Gastroenterology    WI INSERT SPINE INFUSN [facility] N/A 1/19/2017    Procedure: REMOVAL / Justo Fus;  Surgeon: Casie Valenzuela MD;  [facility];  Service: Orthopedics    WI INSERT SPINE INFUSN [facility] N/A 5/16/2016    Procedure: REPLACEMENT AND PROGRAM PUMP ;  Surgeon: Casie Valenzuela MD;  [facility];  Service: Orthopedics       Family History   Problem Relation Age of Onset    Diabetes unspecified Mother     Diabetes unspecified Brother     Diabetes unspecified Paternal Uncle     Diabetes unspecified Maternal Grandmother     Diabetes unspecified Paternal Grandmother     Diabetes unspecified Brother      I have reviewed and agree with the history as documented  Social History     Tobacco Use    Smoking status: Never Smoker    Smokeless tobacco: Never Used   Substance Use Topics    Alcohol use: Yes     Frequency: Monthly or less     Drinks per session: 1 or 2     Binge frequency: Never     Comment: 2 times per year    Drug use:  No Review of Systems   Constitutional: Negative for chills, diaphoresis and fever  Eyes: Negative for visual disturbance  Respiratory: Negative for cough and shortness of breath  Cardiovascular: Negative for chest pain and palpitations  Gastrointestinal: Negative for abdominal pain, diarrhea, nausea and vomiting  Genitourinary: Negative for dysuria, flank pain and frequency  Musculoskeletal: Negative for arthralgias and myalgias  Skin: Negative for color change, rash and wound  Allergic/Immunologic: Negative for immunocompromised state  Neurological: Negative for dizziness and light-headedness  Hematological: Does not bruise/bleed easily  Psychiatric/Behavioral: Positive for dysphoric mood, sleep disturbance and suicidal ideas  Negative for confusion  The patient is not nervous/anxious  Physical Exam  Physical Exam   Constitutional: He is oriented to person, place, and time  He appears well-developed and well-nourished  No distress  HENT:   Head: Normocephalic and atraumatic  Mouth/Throat: Oropharynx is clear and moist    Eyes: Pupils are equal, round, and reactive to light  No scleral icterus  Neck: No JVD present  Cardiovascular: Normal rate and regular rhythm  Exam reveals no gallop and no friction rub  No murmur heard  Pulmonary/Chest: No respiratory distress  He has no wheezes  He has no rales  Abdominal: Soft  Bowel sounds are normal  He exhibits no distension and no mass  There is no tenderness  There is no guarding  Neurological: He is alert and oriented to person, place, and time  Skin: Skin is warm and dry  Capillary refill takes less than 2 seconds  He is not diaphoretic  Psychiatric: His speech is normal  His affect is angry  He is agitated  He is not aggressive and not combative  He exhibits a depressed mood  He expresses suicidal ideation  Vitals reviewed        Vital Signs  ED Triage Vitals   Temperature Pulse Respirations Blood Pressure SpO2 01/28/20 1310 01/28/20 1311 01/28/20 1311 01/28/20 1311 01/28/20 1311   98 2 °F (36 8 °C) 67 18 143/83 97 %      Temp Source Heart Rate Source Patient Position - Orthostatic VS BP Location FiO2 (%)   01/28/20 1310 01/28/20 1311 01/28/20 1311 01/28/20 1311 --   Temporal Monitor Sitting Left arm       Pain Score       01/28/20 1417       Worst Possible Pain           Vitals:    01/28/20 1620 01/28/20 1800 01/28/20 1907 01/28/20 2102   BP: 140/68 139/64 139/64 141/93   Pulse: 59 (!) 54 56 64   Patient Position - Orthostatic VS: Lying Lying Lying Lying         Visual Acuity  Visual Acuity      Most Recent Value   L Pupil Size (mm)  3   R Pupil Size (mm)  3          ED Medications  Medications   OLANZapine (ZyPREXA) IM injection 10 mg (0 mg Intramuscular Hold 1/28/20 1346)   HYDROcodone-acetaminophen (NORCO) 5-325 mg per tablet 1 tablet (1 tablet Oral Given 1/28/20 1417)   oxyCODONE (ROXICODONE) IR tablet 5 mg (5 mg Oral Given 1/28/20 1913)       Diagnostic Studies  Results Reviewed     Procedure Component Value Units Date/Time    Urine Microscopic [[id number]]  (Normal) Collected:  01/28/20 1638    Lab Status:  Final result Specimen:  Urine, Straight Cath Updated:  01/28/20 1737     RBC, UA None Seen /hpf      WBC, UA None Seen /hpf      Epithelial Cells None Seen /hpf      Bacteria, UA None Seen /hpf     Rapid drug screen, urine [[id number]]  (Abnormal) Collected:  01/28/20 1639    Lab Status:  Final result Specimen:  Urine, Catheter Updated:  01/28/20 1710     Amph/Meth UR Negative     Barbiturate Ur Negative     Benzodiazepine Urine Negative     Cocaine Urine Negative     Methadone Urine Negative     Opiate Urine Positive     PCP Ur Negative     THC Urine Negative    Narrative:       Presumptive report  If requested, specimen will be sent to reference lab for confirmation  FOR MEDICAL PURPOSES ONLY  IF CONFIRMATION NEEDED PLEASE CONTACT THE LAB WITHIN 5 DAYS      Drug Screen Cutoff Levels:  AMPHETAMINE/METHAMPHETAMINES  1000 ng/mL  BARBITURATES     200 ng/mL  BENZODIAZEPINES     200 ng/mL  COCAINE      300 ng/mL  METHADONE      300 ng/mL  OPIATES      300 ng/mL  PHENCYCLIDINE     25 ng/mL  THC       50 ng/mL      UA (URINE) with reflex to Scope [[id number]]  (Abnormal) Collected:  01/28/20 1638    Lab Status:  Final result Specimen:  Urine, Straight Cath Updated:  01/28/20 1702     Color, UA Yellow     Clarity, UA Clear     Specific Gravity, UA 1 025     pH, UA 7 0     Leukocytes, UA Negative     Nitrite, UA Negative     Protein, UA 30 (1+) mg/dl      Glucose, UA Negative mg/dl      Ketones, UA Negative mg/dl      Urobilinogen, UA 2 0 E U /dl      Bilirubin, UA Negative     Blood, UA Negative    TSH [[id number]]  (Normal) Collected:  01/28/20 1350    Lab Status:  Final result Specimen:  Blood from Arm, Right Updated:  01/28/20 1422     TSH 3RD GENERATON 2 318 uIU/mL     Narrative:       Patients undergoing fluorescein dye angiography may retain small amounts of fluorescein in the body for 48-72 hours post procedure  Samples containing fluorescein can produce falsely depressed TSH values  If the patient had this procedure,a specimen should be resubmitted post fluorescein clearance        CBC and differential [[id number]] Collected:  01/28/20 1350    Lab Status:  Final result Specimen:  Blood from Arm, Right Updated:  01/28/20 1417     WBC 9 06 Thousand/uL      RBC 5 16 Million/uL      Hemoglobin 15 3 g/dL      Hematocrit 46 7 %      MCV 91 fL      MCH 29 7 pg      MCHC 32 8 g/dL      RDW 13 5 %      MPV 10 4 fL      Platelets 510 Thousands/uL      nRBC 0 /100 WBCs      Neutrophils Relative 70 %      Immat GRANS % 1 %      Lymphocytes Relative 18 %      Monocytes Relative 6 %      Eosinophils Relative 4 %      Basophils Relative 1 %      Neutrophils Absolute 6 37 Thousands/µL      Immature Grans Absolute 0 07 Thousand/uL      Lymphocytes Absolute 1 66 Thousands/µL      Monocytes Absolute 0 53 Thousand/µL      Eosinophils Absolute 0 38 Thousand/µL      Basophils Absolute 0 05 Thousands/µL     Comprehensive metabolic panel [[id number]]  (Abnormal) Collected:  01/28/20 1350    Lab Status:  Final result Specimen:  Blood from Arm, Right Updated:  01/28/20 1414     Sodium 142 mmol/L      Potassium 4 4 mmol/L      Chloride 108 mmol/L      CO2 26 mmol/L      ANION GAP 8 mmol/L      BUN 9 mg/dL      Creatinine 1 08 mg/dL      Glucose 96 mg/dL      Calcium 8 9 mg/dL      AST 15 U/L      ALT 17 U/L      Alkaline Phosphatase 96 U/L      Total Protein 6 8 g/dL      Albumin 3 3 g/dL      Total Bilirubin 0 50 mg/dL      eGFR 76 ml/min/1 73sq m     Narrative:       [place] guidelines for Chronic Kidney Disease (CKD):     Stage 1 with normal or high GFR (GFR > 90 mL/min/1 73 square meters)    Stage 2 Mild CKD (GFR = 60-89 mL/min/1 73 square meters)    Stage 3A Moderate CKD (GFR = 45-59 mL/min/1 73 square meters)    Stage 3B Moderate CKD (GFR = 30-44 mL/min/1 73 square meters)    Stage 4 Severe CKD (GFR = 15-29 mL/min/1 73 square meters)    Stage 5 End Stage CKD (GFR <15 mL/min/1 73 square meters)  Note: GFR calculation is accurate only with a steady state creatinine    POCT alcohol breath test [[id number]]  (Normal) Resulted:  01/28/20 1356    Lab Status:  Final result Updated:  01/28/20 1356     EXTBreath Alcohol 0 0                 No orders to display              Procedures  ECG 12 Lead Documentation Only  Date/Time: 1/28/2020 1:45 PM  Performed by: Mauricio Cummings PA-C  Authorized by: Mauricio Cummings PA-C     Indications / Diagnosis:  SI  ECG reviewed by me, the ED Provider: yes    Patient location:  ED  Previous ECG:     Previous ECG:  Compared to current    Similarity:  No change  Interpretation:     Interpretation: non-specific    Rate:     ECG rate:  67    ECG rate assessment: normal    Rhythm:     Rhythm: sinus rhythm    Ectopy:     Ectopy: none    QRS:     QRS axis:  Left    QRS intervals: Wide  Conduction:     Conduction: abnormal      Abnormal conduction: complete RBBB    ST segments:     ST segments:  Normal  T waves:     T waves: normal               ED Course  ED Course as of Jan 28 2220   Tue Jan 28, 2020   1316 Patient is unwilling to change into psychiatric scrubs  Will complete full body surge and remove any potentially hazardous items      1331 Patient becoming increasingly agitated and refuses to comply with withdrawal of his belongings  Patient repeatedly stating "you're going to have to take it away from my dead body"  Will give 10 mg IM Zyprexa      1405 Zyprexa held as patient is allowing for belongings to be withheld, requesting PO [place] for chronic pain      1550 Pt is amenable to straight cath for UA      1613 Crisis worker Shirley Hicks will see patient face to face      [phone] After crisis consultation patient is willing to sign in 201  He does have a prior diagnosis of Alzheimer's dementia, however there is no supportive evidence to suggest this is the case  He was evaluated by his neurologist today without any diagnosis of Alzheimer's  Patient states this diagnosis was given initially by primary care physician, however "she then said I did not have dementia"  He is exhibiting full ability and capacity to make medical decisions this time and I do not feel that involuntary commission is warranted      1803 Discussed with crisis worker Jason Lyle he is amenable to patient's signing a 201 this time  Patient is medically cleared for psychiatric admission at this time       signed      2116 Transport TBD  Accepted at [facility]                                  MDM  Number of Diagnoses or Management Options  Depression with suicidal ideation: new and requires workup  Diagnosis management comments: Patient will be transferred to [facility] for behavioral treatment after signing his 12   He remained calm and cooperative for the duration of his ER stay after the initial events  Amount and/or Complexity of Data Reviewed  Clinical lab tests: ordered and reviewed  Tests in the medicine section of CPT®: reviewed and ordered  Review and summarize past medical records: yes  Discuss the patient with other providers: yes          Disposition  Final diagnoses:   Depression with suicidal ideation     Time reflects when diagnosis was documented in both MDM as applicable and the Disposition within this note     Time User Action Codes Description Comment    1/28/2020  7:39 PM Misael Allen Add [F32 9] Depression, unspecified depression type     1/28/2020  7:39 PM Misael Allen Modify [F32 9] Depression, unspecified depression type     1/28/2020  9:15 PM Boyd Hand Add [F32 9,  R44 851] Depression with suicidal ideation       ED Disposition     ED Disposition Condition Date/Time Comment    Transfer to Another [address] Jan 28, 2020  9:14 PM Makayla Hadley should be transferred out to [facility]          MD Documentation      Most Recent Value   Patient Condition  The patient has been stabilized such that within reasonable medical probability, no material deterioration of the patient condition or the condition of the unborn child(jc) is likely to result from the transfer   Reason for Transfer  Level of Care needed not available at this facility [Psych]   Benefits of Transfer  Specialized equipment and/or services available at the receiving facility (Include comment)________________________   Risks of Transfer  Potential for delay in receiving treatment   Accepting Physician  [address] [place] [address] Name, [facility] (Name & Tel number)  PACS/Crisis   Transported by (Company and Unit #)  TBD   Sending MD Merlyn Butt   Provider Certification  The patient is stable for psychiatric transfer because they are medically stable, and is protected from harming him/herself or others during transport RN Documentation      Most Recent Value   Accepting Facility Name, [facility] (Name & Tel number)  PACS/Crisis   Transported by (Company and Unit #)  TBD      Follow-up Information    None         Patient's Medications   Discharge Prescriptions    No medications on file     No discharge procedures on file      ED Provider  Electronically Signed by           Barb Moe PA-C  01/28/20 1399

## 2020-01-28 NOTE — ED NOTES
CW started bed search, CW spoke with Juan Manuel Ahuja  who informed me that pt clinical can be presented to [address] [place], [place] [address]U for possible admission, 201 has been faxed        [address] [facility]

## 2020-01-28 NOTE — ED NOTES
Pt unable to provide urine sample at this time   States "I have kidney problems and I only pee once a day and I went this morning"     Jazmin Plaza  01/28/20 6827

## 2020-01-28 NOTE — ASSESSMENT & PLAN NOTE
We will  reschedule him for his Botox as this was beneficial for him  He is behind in his injections at this time    He is to contact us after he receives some care for his depression to see additional ways to help break his headaches

## 2020-01-28 NOTE — ASSESSMENT & PLAN NOTE
met with patient and we are in agreement that patient needs urgent evaluation     will contact his daughter to inform her that we are calling 911 to provide him immediate help for his suicidal ideations with a plan

## 2020-01-29 ENCOUNTER — HOSPITAL ENCOUNTER (INPATIENT)
Facility: HOSPITAL | Age: 60
LOS: 2 days | Discharge: HOME | DRG: 885 | End: 2020-01-31
Attending: PSYCHIATRY & NEUROLOGY | Admitting: PSYCHIATRY & NEUROLOGY
Payer: MEDICARE

## 2020-01-29 DIAGNOSIS — F33.1 MAJOR DEPRESSIVE DISORDER, RECURRENT, MODERATE (HCC): Primary | Chronic | ICD-10-CM

## 2020-01-29 DIAGNOSIS — F32.A DEPRESSION, UNSPECIFIED DEPRESSION TYPE: ICD-10-CM

## 2020-01-29 PROBLEM — J44.9 COPD (CHRONIC OBSTRUCTIVE PULMONARY DISEASE) (HCC): Status: ACTIVE | Noted: 2020-01-29

## 2020-01-29 LAB
ATRIAL RATE: 67 BPM
P AXIS: 34 DEGREES
PR INTERVAL: 194 MS
QRS AXIS: -70 DEGREES
QRSD INTERVAL: 138 MS
QT INTERVAL: 438 MS
QTC INTERVAL: 462 MS
T WAVE AXIS: 24 DEGREES
VENTRICULAR RATE: 67 BPM

## 2020-01-29 PROCEDURE — 99222 1ST HOSP IP/OBS MODERATE 55: CPT | Performed by: PSYCHIATRY & NEUROLOGY

## 2020-01-29 PROCEDURE — 99222 1ST HOSP IP/OBS MODERATE 55: CPT | Performed by: FAMILY MEDICINE

## 2020-01-29 PROCEDURE — 93010 ELECTROCARDIOGRAM REPORT: CPT | Performed by: INTERNAL MEDICINE

## 2020-01-29 PROCEDURE — 93005 ELECTROCARDIOGRAM TRACING: CPT

## 2020-01-29 RX ORDER — GABAPENTIN 400 MG/1
800 CAPSULE ORAL 2 TIMES DAILY
Status: DISCONTINUED | OUTPATIENT
Start: 2020-01-29 | End: 2020-01-31 | Stop reason: HOSPADM

## 2020-01-29 RX ORDER — CLOPIDOGREL BISULFATE 75 MG/1
75 TABLET ORAL DAILY
Status: DISCONTINUED | OUTPATIENT
Start: 2020-01-29 | End: 2020-01-31 | Stop reason: HOSPADM

## 2020-01-29 RX ORDER — RISPERIDONE 1 MG/1
1 TABLET, ORALLY DISINTEGRATING ORAL EVERY 8 HOURS PRN
Status: DISCONTINUED | OUTPATIENT
Start: 2020-01-29 | End: 2020-01-31 | Stop reason: HOSPADM

## 2020-01-29 RX ORDER — TAMSULOSIN HYDROCHLORIDE 0.4 MG/1
0.4 CAPSULE ORAL
Status: DISCONTINUED | OUTPATIENT
Start: 2020-01-29 | End: 2020-01-31 | Stop reason: HOSPADM

## 2020-01-29 RX ORDER — ASPIRIN 81 MG/1
81 TABLET, CHEWABLE ORAL DAILY
Status: DISCONTINUED | OUTPATIENT
Start: 2020-01-29 | End: 2020-01-31 | Stop reason: HOSPADM

## 2020-01-29 RX ORDER — BENZTROPINE MESYLATE 1 MG/ML
1 INJECTION INTRAMUSCULAR; INTRAVENOUS EVERY 6 HOURS PRN
Status: DISCONTINUED | OUTPATIENT
Start: 2020-01-29 | End: 2020-01-31 | Stop reason: HOSPADM

## 2020-01-29 RX ORDER — ACETAMINOPHEN 325 MG/1
650 TABLET ORAL EVERY 4 HOURS PRN
Status: DISCONTINUED | OUTPATIENT
Start: 2020-01-29 | End: 2020-01-31 | Stop reason: HOSPADM

## 2020-01-29 RX ORDER — ATORVASTATIN CALCIUM 40 MG/1
40 TABLET, FILM COATED ORAL DAILY
Status: DISCONTINUED | OUTPATIENT
Start: 2020-01-29 | End: 2020-01-31 | Stop reason: HOSPADM

## 2020-01-29 RX ORDER — GABAPENTIN 300 MG/1
300 CAPSULE ORAL AS NEEDED
Status: DISCONTINUED | OUTPATIENT
Start: 2020-01-29 | End: 2020-01-31 | Stop reason: HOSPADM

## 2020-01-29 RX ORDER — LORAZEPAM 1 MG/1
1 TABLET ORAL EVERY 6 HOURS PRN
Status: DISCONTINUED | OUTPATIENT
Start: 2020-01-29 | End: 2020-01-31 | Stop reason: HOSPADM

## 2020-01-29 RX ORDER — MELATONIN
5000 DAILY
Status: DISCONTINUED | OUTPATIENT
Start: 2020-01-29 | End: 2020-01-31 | Stop reason: HOSPADM

## 2020-01-29 RX ORDER — NYSTATIN 100000 U/G
1 CREAM TOPICAL 2 TIMES DAILY
Status: DISCONTINUED | OUTPATIENT
Start: 2020-01-29 | End: 2020-01-31 | Stop reason: HOSPADM

## 2020-01-29 RX ORDER — BENZTROPINE MESYLATE 1 MG/1
1 TABLET ORAL EVERY 6 HOURS PRN
Status: DISCONTINUED | OUTPATIENT
Start: 2020-01-29 | End: 2020-01-31 | Stop reason: HOSPADM

## 2020-01-29 RX ORDER — LORAZEPAM 1 MG/1
1 TABLET ORAL EVERY 4 HOURS PRN
Status: DISCONTINUED | OUTPATIENT
Start: 2020-01-29 | End: 2020-01-29

## 2020-01-29 RX ORDER — ALBUTEROL SULFATE 90 UG/1
2 AEROSOL, METERED RESPIRATORY (INHALATION) EVERY 6 HOURS PRN
Status: DISCONTINUED | OUTPATIENT
Start: 2020-01-29 | End: 2020-01-31 | Stop reason: HOSPADM

## 2020-01-29 RX ORDER — TRAZODONE HYDROCHLORIDE 100 MG/1
200 TABLET ORAL
Status: DISCONTINUED | OUTPATIENT
Start: 2020-01-29 | End: 2020-01-31 | Stop reason: HOSPADM

## 2020-01-29 RX ORDER — ALBUTEROL SULFATE 2.5 MG/3ML
1.25 SOLUTION RESPIRATORY (INHALATION) EVERY 6 HOURS PRN
Status: DISCONTINUED | OUTPATIENT
Start: 2020-01-29 | End: 2020-01-31 | Stop reason: HOSPADM

## 2020-01-29 RX ORDER — BACLOFEN 10 MG/1
10 TABLET ORAL 3 TIMES DAILY
Status: DISCONTINUED | OUTPATIENT
Start: 2020-01-29 | End: 2020-01-31 | Stop reason: HOSPADM

## 2020-01-29 RX ORDER — NITROGLYCERIN 0.4 MG/1
0.4 TABLET SUBLINGUAL
Status: DISCONTINUED | OUTPATIENT
Start: 2020-01-29 | End: 2020-01-31 | Stop reason: HOSPADM

## 2020-01-29 RX ORDER — HYDROXYZINE 50 MG/1
50 TABLET, FILM COATED ORAL EVERY 6 HOURS PRN
Status: DISCONTINUED | OUTPATIENT
Start: 2020-01-29 | End: 2020-01-31 | Stop reason: HOSPADM

## 2020-01-29 RX ORDER — CALCIUM CARBONATE 500(1250)
1 TABLET ORAL
Status: DISCONTINUED | OUTPATIENT
Start: 2020-01-30 | End: 2020-01-31 | Stop reason: HOSPADM

## 2020-01-29 RX ORDER — OLANZAPINE 10 MG/1
10 INJECTION, POWDER, LYOPHILIZED, FOR SOLUTION INTRAMUSCULAR 2 TIMES DAILY PRN
Status: DISCONTINUED | OUTPATIENT
Start: 2020-01-29 | End: 2020-01-31 | Stop reason: HOSPADM

## 2020-01-29 RX ORDER — PANTOPRAZOLE SODIUM 40 MG/1
40 TABLET, DELAYED RELEASE ORAL
Status: DISCONTINUED | OUTPATIENT
Start: 2020-01-29 | End: 2020-01-31 | Stop reason: HOSPADM

## 2020-01-29 RX ORDER — MAGNESIUM HYDROXIDE/ALUMINUM HYDROXICE/SIMETHICONE 120; 1200; 1200 MG/30ML; MG/30ML; MG/30ML
30 SUSPENSION ORAL EVERY 4 HOURS PRN
Status: DISCONTINUED | OUTPATIENT
Start: 2020-01-29 | End: 2020-01-31 | Stop reason: HOSPADM

## 2020-01-29 RX ORDER — FOLIC ACID 1 MG/1
1 TABLET ORAL DAILY
Status: DISCONTINUED | OUTPATIENT
Start: 2020-01-29 | End: 2020-01-31 | Stop reason: HOSPADM

## 2020-01-29 RX ORDER — FLUDROCORTISONE ACETATE 0.1 MG/1
0.1 TABLET ORAL DAILY
Status: DISCONTINUED | OUTPATIENT
Start: 2020-01-29 | End: 2020-01-31 | Stop reason: HOSPADM

## 2020-01-29 RX ORDER — TRAZODONE HYDROCHLORIDE 50 MG/1
25 TABLET ORAL
Status: DISCONTINUED | OUTPATIENT
Start: 2020-01-29 | End: 2020-01-29

## 2020-01-29 RX ORDER — FLUTICASONE FUROATE AND VILANTEROL 200; 25 UG/1; UG/1
1 POWDER RESPIRATORY (INHALATION)
Status: DISCONTINUED | OUTPATIENT
Start: 2020-01-29 | End: 2020-01-31 | Stop reason: HOSPADM

## 2020-01-29 RX ORDER — HALOPERIDOL 5 MG
5 TABLET ORAL EVERY 8 HOURS PRN
Status: DISCONTINUED | OUTPATIENT
Start: 2020-01-29 | End: 2020-01-31 | Stop reason: HOSPADM

## 2020-01-29 RX ORDER — GABAPENTIN 600 MG/1
600 TABLET ORAL 3 TIMES DAILY
Status: DISCONTINUED | OUTPATIENT
Start: 2020-01-29 | End: 2020-01-29

## 2020-01-29 RX ADMIN — BACLOFEN 10 MG: 10 TABLET ORAL at 21:54

## 2020-01-29 RX ADMIN — SERTRALINE HYDROCHLORIDE 150 MG: 50 TABLET ORAL at 13:35

## 2020-01-29 RX ADMIN — GABAPENTIN 800 MG: 400 CAPSULE ORAL at 12:20

## 2020-01-29 RX ADMIN — BACLOFEN 10 MG: 10 TABLET ORAL at 17:25

## 2020-01-29 RX ADMIN — NYSTATIN 1 APPLICATION: 100000 CREAM TOPICAL at 19:07

## 2020-01-29 RX ADMIN — MELATONIN 5000 UNITS: at 12:14

## 2020-01-29 RX ADMIN — FLUDROCORTISONE ACETATE 0.1 MG: 0.1 TABLET ORAL at 12:16

## 2020-01-29 RX ADMIN — FOLIC ACID 1 MG: 1 TABLET ORAL at 12:16

## 2020-01-29 RX ADMIN — GABAPENTIN 800 MG: 400 CAPSULE ORAL at 17:25

## 2020-01-29 RX ADMIN — TRAZODONE HYDROCHLORIDE 200 MG: 100 TABLET ORAL at 23:47

## 2020-01-29 RX ADMIN — ASPIRIN 81 MG 81 MG: 81 TABLET ORAL at 12:12

## 2020-01-29 RX ADMIN — CLOPIDOGREL BISULFATE 75 MG: 75 TABLET ORAL at 12:16

## 2020-01-29 RX ADMIN — BACLOFEN 10 MG: 10 TABLET ORAL at 12:14

## 2020-01-29 RX ADMIN — PANTOPRAZOLE SODIUM 40 MG: 40 TABLET, DELAYED RELEASE ORAL at 12:17

## 2020-01-29 RX ADMIN — TAMSULOSIN HYDROCHLORIDE 0.4 MG: 0.4 CAPSULE ORAL at 17:32

## 2020-01-29 NOTE — CASE MANAGEMENT
Patient was admitted to [facility] on a 201 admission from [facility] ER  Patient was calm and cooperative with this CM for intake assessment  Patient was referred to inpatient treatment due to making suicidal comments during a doctors appointment  Patient endorsed increasing depression with suicidal ideation  Patient was alert and orientated during this assessment  Patient was polite and pleasant with conversations, able to answer all questions appropriate  Patient did verbalize his desires to be discharged as soon as possible  Patient currently rates his depression as 7/10  Patient denies any suicidal or homicidal ideation  Patient currently rates his anxiety as 5/10  Patient denies any auditory or visual hallucinations  Patient denies any referral for D&A services  UDS positive for Opiates upon admission  Patient currently has outpatient mental health services provided by the [facility] [address] [facility] [address]   Phone call made, services confirmed  Patient insurance is [address] [employer]  Secondary is [employer] AND [facility] AND [address]  All appropriate paperwork signed  Patient current income is $1,300/month SSD  Patient states he does have his 's license and his own vehicle for transportation  Patient interest are watching television and spending time with his granddaughter  Patient denies access to any firearms  Patient denies any legal concerns  Patient states he does have a very supportive family  Patient strengths are:  Good Support System, Current Outpatient Services, Currently Insured  Patient stressors are:  Medical Concerns, 24year old Autistic Child  Insurance issues

## 2020-01-29 NOTE — ED NOTES
Patient family requesting to take home all belongings, patient agrees  Belongings given to patient wife       Jhoana HAILEY Wyatt  01/28/20 1939

## 2020-01-29 NOTE — ED NOTES
Patient provided pillow and repositioning to left side off of sacrum       Amira Locust  01/28/20 7374

## 2020-01-29 NOTE — ED NOTES
Patient expressing concerns about a MRI scheduled for this coming Thursday       Annalisa Mg  01/28/20 6478

## 2020-01-29 NOTE — ED NOTES
Turned down lights in room per patients request  Stated it would help his headache       Devaughn Wooten  01/28/20 2001

## 2020-01-29 NOTE — EMTALA/ACUTE CARE TRANSFER
[facility] EMERGENCY DEPARTMENT  [address]  [address] [facility] [address]  Dept: [phone]      PYFHRE TRANSFER CONSENT    NAME Karishma HANSEN 1960                              MRN [id number]    I have been informed of my rights regarding examination, treatment, and transfer   by Dr Gallo Mcleod MD    Benefits: Specialized equipment and/or services available at the receiving facility (Include comment)________________________    Risks: Potential for delay in receiving treatment      Consent for Transfer:  I acknowledge that my medical condition has been evaluated and explained to me by the emergency department physician or other qualified medical person and/or my attending physician, who has recommended that I be transferred to the service of  Accepting Physician: Renate Chow at [facility] [address], [address] : Bakari Walter  The above potential benefits of such transfer, the potential risks associated with such transfer, and the probable risks of not being transferred have been explained to me, and I fully understand them  The doctor has explained that, in my case, the benefits of transfer outweigh the risks  I agree to be transferred  I authorize the performance of emergency medical procedures and treatments upon me in both transit and upon arrival at the receiving facility  Additionally, I authorize the release of any and all medical records to the receiving facility and request they be transported with me, if possible  I understand that the safest mode of transportation during a medical emergency is an ambulance and that the Hospital advocates the use of this mode of transport  Risks of traveling to the receiving facility by car, including absence of medical control, life sustaining equipment, such as oxygen, and medical personnel has been explained to me and I fully understand them      ([address] CORRECT [address]OX BELOW)  [  ]  I consent to the stated transfer and to be transported by ambulance/helicopter  [  ]  I consent to the stated transfer, but refuse transportation by ambulance and accept full responsibility for my transportation by car  I understand the risks of non-ambulance transfers and I exonerate the Hospital and its staff from any deterioration in my condition that results from this refusal     X___________________________________________    DATE  20  TIME________  Signature of patient or legally responsible individual signing on patient behalf           RELATIONSHIP TO PATIENT_________________________          Provider Certification    NAME Jr Cheatham                                         1960                              MRN [id number]    A medical screening exam was performed on the above named patient  Based on the examination:    Condition Necessitating Transfer The primary encounter diagnosis was Depression, unspecified depression type  A diagnosis of Depression with suicidal ideation was also pertinent to this visit      Patient Condition: The patient has been stabilized such that within reasonable medical probability, no material deterioration of the patient condition or the condition of the unborn child(jc) is likely to result from the transfer    Reason for Transfer: Level of Care needed not available at this facility(Psych)    Transfer Requirements: [address]   · Space available and qualified personnel available for treatment as acknowledged by PACS/Crisis  · Agreed to accept transfer and to provide appropriate medical treatment as acknowledged by       Carole[place]  · Appropriate medical records of the examination and treatment of the patient are provided at the time of transfer   [address] _______  · Transfer will be performed by qualified personnel from [facility]  and appropriate transfer equipment as required, including the use of necessary and appropriate life support measures  Provider Certification: I have examined the patient and explained the following risks and benefits of being transferred/refusing transfer to the patient/family:  The patient is stable for psychiatric transfer because they are medically stable, and is protected from harming him/herself or others during transport      Based on these reasonable risks and benefits to the patient and/or the unborn child(jc), and based upon the information available at the time of the patients examination, I certify that the medical benefits reasonably to be expected from the provision of appropriate medical treatments at another medical facility outweigh the increasing risks, if any, to the individuals medical condition, and in the case of labor to the unborn child, from effecting the transfer      X____________________________________________ DATE 01/28/20        TIME_______      ORIGINAL - SEND TO MEDICAL RECORDS   COPY - SEND WITH PATIENT DURING TRANSFER

## 2020-01-29 NOTE — PROGRESS NOTES
Pt did not attend group when prompted or offered         01/29/20 1000   Activity/Group Checklist   Group Other (Comment)  (short term problem solving: stages of change)   Attendance Did not attend

## 2020-01-29 NOTE — ASSESSMENT & PLAN NOTE
· He has recently on January 9th received another stent denies any chest pain he received a [facility]  He is to continue aspirin Plavix and to be on Plavix for 6 months and an aspirin lifelong  Continue Lipitor, as stated but his cardiologist there is no need for an Ace or Arb secondary to his EF being about 40%  And he has contraindication to beta-blocker secondary to orthostatic hypotension and being on Florinef

## 2020-01-29 NOTE — NURSING NOTE
Pt was admitted from [facility] ED as a 201  Per report, pt was brought to ED via EMS with increased anxiety and depression after speaking with his primary care physician; telling physician he has current suicidal ideations with a plan to crash his car or OD on pills due to his current multiple medical issues (cardiac issues, has to now use a walker, chronic pain (morphine pump), COPD)  Per report, pt was given oxycodone 5 mg at 1913 at [facility] ED  Pt was extremely irritable going through the admission process  Stated he wants "to sign that 72 hr notice", because he regrets coming here  Pt states "I don't even know why I came here"  Pt did sign 72 hr notice at [place] [address] on 1/29/20 and notified physician per marion note

## 2020-01-29 NOTE — CASE MANAGEMENT
Phone call placed to Tiera Bo at [phone] to inform of patient inpatient treatment  Message left with return number

## 2020-01-29 NOTE — CONSULTS
Consult- Elissaroni Chavezreid 1960, 64 y o  male MRN: [id number]    Unit/Bed#: [facility] 560-97 Encounter: [id number]    Primary Care Provider: Trisha Faria MD   Date and time admitted to hospital: 1/29/2020 12:28 AM      Inpatient consult for Medical Clearance for [facility] patient  Consult performed by: Edwin Tomlinson MD  Consult ordered by: Guru Beal MD          COPD (chronic obstructive pulmonary disease) [facility])  Assessment & Plan  · Not in exacerbation, continue Breo    Type 2 diabetes mellitus with diabetic neuropathy, with long-term current use of insulin ([facility] )  Assessment & Plan  Lab Results   Component Value Date    HGBA1C 4 9 08/10/2019       No results for input(s): POCGLU in the last 72 hours  Blood Sugar Average: Last 72 hrs:  ·  without any medications his blood sugar has been controlled he had a gastric sleeve previously  Orthostatic hypotension  Assessment & Plan  · Blood pressure stable on Florinef    Vitamin D deficiency  Assessment & Plan  · Continue vitamin D    Hyperlipidemia  Assessment & Plan  · Continue statin    Chronic migraine without aura without status migrainosus, not intractable  Assessment & Plan  · Continue his gabapentin follow-up with Neurology he receives Botox injections there as well  Opioid dependence ([facility] )  Assessment & Plan  · Secondary to chronic pain    GERD (gastroesophageal reflux disease)  Assessment & Plan  · PPI    Chronic pain disorder  Assessment & Plan  · Secondary to nerve injury  He is on morphine pump  He has an appointment with pain management on Monday  Also for constipation prevention continue Amitiza  Has right lower extremity weakness secondary to the nerve injury      CPAP (continuous positive airway pressure) dependence  Assessment & Plan  · For to CPAP at night he has ARLEEN not on oxygen during the day    Depression  Assessment & Plan  · Per Psychiatry    CVA (cerebral vascular accident) [facility])  Assessment & Plan  · History of stroke in 2005 he has deficits of upper vision  Coronary artery disease involving native coronary artery  Assessment & Plan  · He has recently on January 9th received another stent denies any chest pain he received a [facility]  He is to continue aspirin Plavix and to be on Plavix for 6 months and an aspirin lifelong  Continue Lipitor, as stated but his cardiologist there is no need for an Ace or Arb secondary to his EF being about 40%  And he has contraindication to beta-blocker secondary to orthostatic hypotension and being on Florinef  Chronic diastolic congestive heart failure ([address] )  Assessment & Plan  Wt Readings from Last 3 Encounters:   01/29/20 103 kg (226 lb 10 1 oz)   01/28/20 103 kg (227 lb)   01/28/20 103 kg (227 lb)       · It compensated she does not need any diuretics last EF he just had a stent placed is 54      * Suicidal ideations  Assessment & Plan  · Management per Psychiatry I did review laboratories  EKG to be ordered was not available  VTE Prophylaxis: Reason for no pharmacologic prophylaxis Encourage ambulation  / reason for no mechanical VTE prophylaxis Contraindicated in Psychiatry     Recommendations for Discharge:  · Follow-up with primary care and pain management after discharge    Counseling / Coordination of Care Time: 1 hour  Greater than 50% of total time spent on patient counseling and coordination of care  Collaboration of Care: Were Recommendations Directly Discussed with Primary Treatment Team? - Yes     History of Present Illness:    Agnes Fisher is a 64 y o  male who is originally admitted to the behavioral health service due to expressing suicidal thoughts  We are consulted for medical evaluation and management  Patient seen and examined denies any chest pain or shortness of breath he just has chronic pain for which he is on a morphine pump    He tells me he only goes Ps once a day but there is no constipation or diarrhea he had a bowel movement today there is no dizziness  Review of Systems:    Review of Systems   Constitutional: Negative for fatigue and fever  HENT: Negative  Negative for ear pain, rhinorrhea and sore throat  Eyes: Negative  Negative for pain and itching  Respiratory: Negative  Negative for cough, chest tightness, shortness of breath and wheezing  Cardiovascular: Negative  Negative for chest pain, palpitations and leg swelling  Gastrointestinal: Negative  Negative for abdominal pain, diarrhea, nausea and vomiting  Endocrine: Negative for polydipsia, polyphagia and polyuria  Genitourinary: Negative for dysuria and flank pain  Musculoskeletal: Negative  Negative for back pain and myalgias  Chronic pain   Skin: Negative  Negative for rash and wound  Neurological: Negative  Negative for dizziness, syncope, speech difficulty, weakness, light-headedness, numbness and headaches  Psychiatric/Behavioral: Positive for suicidal ideas  Negative for agitation, confusion and decreased concentration  All other systems reviewed and are negative        Past Medical and Surgical History:     Past Medical History:   Diagnosis Date    Acute on chronic diastolic congestive heart failure (HCC)     Altered gait     Alzheimer disease ([facility]     per patients,,early onset     Angina pectoris ([facility] )     Anxiety     Arthritis     Brain aneurysm     coils placed    Cardiac disease     Chest pain 1/13/2016    Chronic pain     back/ right groin and rle- has morphine pump    Constipation     COPD (chronic obstructive pulmonary disease) (HCC)     Coronary artery disease     CPAP (continuous positive airway pressure) dependence     Decubital ulcer     sacral decub-occured 5/2019-sees wound care/debide in OR today 6/6/2019    Dependent on walker for ambulation     w/c for long distance    Depression     Diabetes mellitus (HCC)     insulin dependent    Dizziness     occ    Dysphagia     Enlarged prostate  Fall     GERD (gastroesophageal reflux disease)     Heart failure ([facility] )     Hiatal hernia     Hx of gastric bypass 11/19/2018    Hypercholesterolemia     Hypertension     MI (myocardial infarction) ([facility] )     2017- stents x2    Migraine     Morbid obesity ([place] [address] )     gastric bypass sleeve 11/2018-wt loss 125 lb    Neuropathy     Oxygen dependent     Q HS  2LPM with CPAP and prn during day 2-3 LPM     Renal disorder     Shortness of breath     Skin abnormality     sacral wound - covered with pad    Sleep apnea     Stented coronary artery     Stroke ([place] [address] )     vision loss b/l  2005, residual R leg weakness    Urinary frequency     Use of cane as ambulatory aid     Wears dentures     Wears glasses     Wears glasses     Wheelchair dependent        Past Surgical History:   Procedure Laterality Date    BACK SURGERY      BRAIN SURGERY      CARDIAC CATHETERIZATION      with stents    CEREBRAL ANEURYSM REPAIR      with coils    COLONOSCOPY      ESOPHAGOGASTRODUODENOSCOPY N/A 7/1/2016    Procedure: ESOPHAGOGASTRODUODENOSCOPY (EGD); Surgeon: Ngoc Boo MD;  [facility]; Service:     GASTRIC BYPASS  11/19/2018    HERNIA REPAIR      HIATAL HERNIA REPAIR      INFUSION PUMP IMPLANTATION Left     morphine    KNEE ARTHROSCOPY Right     KNEE ARTHROSCOPY Right     PERONEAL NERVE DECOMPRESSION Right     AZ DEBRIDEMENT OPEN WOUND 20 SQ CM< N/A 6/6/2019    Procedure: EXCISIONAL DEBRIDEMENT OF SACRAL DECUBITUS ULCER;  Surgeon: Ibis Butler MD;  [facility];  Service: General    AZ ESOPHAGOGASTRODUODENOSCOPY TRANSORAL DIAGNOSTIC N/A 2/27/2017    Procedure: ESOPHAGOGASTRODUODENOSCOPY (EGD); Surgeon: Ngoc Boo MD;  [facility]: [facility]; Service: Gastroenterology    AZ ESOPHAGOGASTRODUODENOSCOPY TRANSORAL DIAGNOSTIC N/A 8/23/2018    Procedure: ESOPHAGOGASTRODUODENOSCOPY (EGD) with biopsy;  Surgeon: Ngoc Boo MD;  [facility];   Service: Gastroenterology    AZ INSERT SPINE INFUSN DEVICE,SUBCUT N/A 2017    Procedure: REMOVAL / EXCHANGE INTRATHECAL PAIN PUMP;  Surgeon: More Castillo MD;  [facility];  Service: Orthopedics    MT INSERT SPINE INFUSN [facility] N/A 2016    Procedure: REPLACEMENT AND PROGRAM PUMP ;  Surgeon: More Castillo MD;  [facility];  Service: Orthopedics       Meds/Allergies:    PTA meds:   Prior to Admission Medications   Prescriptions Last Dose Informant Patient Reported? Taking?    ADVAIR DISKUS 500-50 MCG/DOSE inhaler Not Taking Self Yes No   Sig: Inhale 1 puff 2 (two) times a day   AMITIZA 24 MCG capsule Not Taking at Unknown time Self Yes No   Sig: Take 24 mcg by mouth 2 (two) times a day with meals   CALCIUM PO 2020 at 0900 Self Yes Yes   Sig: Take 1 tablet by mouth daily   Cholecalciferol (VITAMIN D3) 5000 units CAPS More than a month at Unknown time Self No No   Sig: Take 1 capsule (5,000 Units total) by mouth daily   Cyanocobalamin (VITAMIN B-12 PO) 2020 at 0900 Self Yes Yes   Sig: Take 1 tablet by mouth daily   Linaclotide 290 MCG CAPS Unknown at Unknown time Self Yes No   Sig: Take 290 mcg by mouth as needed    Methylnaltrexone Bromide (RELISTOR) 150 MG TABS Not Taking at Unknown time Self Yes No   Sig: Take 450 mg by mouth as needed    Morphine Sulfate Microinfusion (MORPHINE SULF MICROINFUSION PF IJ) 2020 at Unknown time Self Yes No   Sig: Inject 14 mg as directed daily Via infusion pump   Multiple Vitamin (MULTIVITAMIN) tablet 2020 at 0900 Self Yes Yes   Sig: Take 1 tablet by mouth daily    POTASSIUM PO Not Taking at Unknown time Self Yes No   Sig: Take 40 mEq by mouth 2 (two) times a day   Pediatric Multivit-Minerals-C (POLYVITAMIN/IRON) CHEW 2020 at 0900 Self Yes Yes   Sig: Chew 1 tablet daily   ULTICARE SHORT PEN NEEDLES 31G X 8 MM MISC Not Taking at Unknown time Self Yes No   Si INJECTIONS PER DAY DX  E11 8   albuterol (ACCUNEB) 1 25 MG/3ML nebulizer solution Past Week at Unknown time Self Yes Yes   Sig: Take 1 ampule by nebulization every 6 (six) hours as needed for wheezing   albuterol (PROVENTIL HFA,VENTOLIN HFA) 90 mcg/act inhaler Past Week at Unknown time Self Yes Yes   Sig: Inhale 2 puffs every 6 (six) hours as needed for wheezing   aspirin 81 MG tablet 1/28/2020 at 0900 Self Yes Yes   Sig: Take 81 mg by mouth daily   atorvastatin (LIPITOR) 40 mg tablet 1/27/2020 at 1700 Self Yes Yes   Sig: Take 40 mg by mouth daily  baclofen 10 mg tablet 1/28/2020 at 0900 Self Yes Yes   Sig: Take 10 mg by mouth 3 (three) times a day   clopidogrel (PLAVIX) 75 mg tablet More than a month at Unknown time Self Yes No   Sig: Take 75 mg by mouth daily   colchicine (COLCRYS) 0 6 mg tablet   No No   Sig: Take 1 tablet (0 6 mg total) by mouth 2 (two) times a day for 4 doses   ergocalciferol (VITAMIN D2) 50,000 units Not Taking at Unknown time Self Yes No   Sig: Take 50,000 Units by mouth once a week   fludrocortisone (FLORINEF) 0 1 mg tablet 1/28/2020 at 0900 Self No Yes   Sig: Take 1 tablet (0 1 mg total) by mouth daily   folic acid (FOLVITE) 1 mg tablet 1/28/2020 at 0900 Self Yes Yes   Sig: Take 1 mg by mouth daily    gabapentin (NEURONTIN) 300 mg capsule 1/28/2020 at 0900 Self No Yes   Sig: TAKE 1 CAPSULE (300 MG TOTAL) BY MOUTH AS NEEDED (MIGRAINE)   gabapentin (NEURONTIN) 600 MG tablet 1/28/2020 at 0900 Self Yes Yes   Sig: Take 600 mg by mouth 3 (three) times a day   gabapentin (NEURONTIN) 800 mg tablet 1/28/2020 at 0900 Self No No   Sig: Take 1 tablet (800 mg total) by mouth 2 (two) times a day   hydrOXYzine HCL (ATARAX) 25 mg tablet Unknown at 0900 Self Yes No   Sig: TAKE 3 TABLETS (75 MG TOTAL) BY MOUTH 2 (TWO) TIMES A DAY AS NEEDED FOR ANXIETY     hydrOXYzine HCL (ATARAX) 50 mg tablet 1/28/2020 at 0900 Self Yes No   Sig: Take 50 mg by mouth 2 (two) times a day    hydrocortisone 1 % lotion  Self Yes No   Sig: Apply 1 application topically as needed    memantine (NAMENDA) 10 mg tablet 1/28/2020 at 0900 Self No No   Sig: Take 1 tablet (10 mg total) by mouth 2 (two) times a day First 14 days just bedtime   morphine (MS CONTIN) 60 mg 12 hr tablet Not Taking at Unknown time Self No No   Sig: Take 1 tablet (60 mg total) by mouth every 12 (twelve) hoursMax Daily Amount: 120 mg   Patient not taking: Reported on 1/28/2020   nitroglycerin (NITROSTAT) 0 4 mg SL tablet Unknown at Unknown time Self Yes No   Sig: Place 0 4 mg under the tongue every 5 (five) minutes as needed for chest pain (pt has not  used recently)  nystatin (MYCOSTATIN) cream 1/28/2020 at 0900 Self Yes Yes   Sig: Apply 1 application topically 2 (two) times a day   omeprazole (PriLOSEC) 20 mg delayed release capsule 1/28/2020 at 0900 Self No Yes   Sig: Take 1 capsule (20 mg total) by mouth daily   Patient taking differently: Take 40 mg by mouth daily    oxyCODONE (ROXICODONE) 30 MG immediate release tablet Not Taking at Unknown time Self No No   Sig: Take 1 tablet (30 mg total) by mouth every 6 (six) hours as needed for moderate painMax Daily Amount: 120 mg   Patient not taking: Reported on 1/28/2020   polyethylene glycol (GLYCOLAX) powder Not Taking at Unknown time Self Yes No   Sig: MIX 1 HEAPING TABLESPOON (17 G) WITH 8 OUNCES OF WATER  PREPARE AND DRINK SOLUTION ONCE DAILY  potassium chloride (K-DUR,KLOR-CON) 20 mEq tablet 1/28/2020 at 0900 Self Yes No   prochlorperazine (COMPAZINE) 10 mg tablet 1/28/2020 at 0900 Self No No   Sig: Take 1 tablet (10 mg total) by mouth every 6 (six) hours as needed for nausea or vomiting (migraine)   sertraline (ZOLOFT) 100 mg tablet 1/28/2020 at 0900 Self Yes Yes   Sig: Take 150 mg by mouth daily   sertraline (ZOLOFT) 25 mg tablet 1/28/2020 at 0900 Self Yes Yes   Sig: Take 25 mg by mouth daily   tamsulosin (FLOMAX) 0 4 mg 1/27/2020 at 2100 Self Yes Yes   Sig: Take 0 4 mg by mouth daily with dinner     traZODone (DESYREL) 100 mg tablet 1/27/2020 at 2100 Self No Yes   Sig: Take 1 tablet (100 mg total) by mouth daily at bedtime   zolpidem (AMBIEN) 10 mg tablet Not Taking at Unknown time Self Yes No   Sig: Take 10 mg by mouth daily at bedtime as needed for sleep      Facility-Administered Medications: None       Allergies: No Known Allergies    Social History:     Marital Status: /Civil Union    Substance Use History:   Social History     Substance and Sexual Activity   Alcohol Use Yes    Frequency: Monthly or less    Drinks per session: 1 or 2    Binge frequency: Never    Comment: 2 times per year     Social History     Tobacco Use   Smoking Status Never Smoker   Smokeless Tobacco Never Used     Social History     Substance and Sexual Activity   Drug Use No       Family History:    Family History   Problem Relation Age of Onset    Diabetes unspecified Mother     Diabetes unspecified Brother     Diabetes unspecified Paternal Uncle     Diabetes unspecified Maternal Grandmother     Diabetes unspecified Paternal Grandmother     Diabetes unspecified Brother        Physical Exam:     Vitals:   Blood Pressure: 135/68 (01/29/20 0717)  Pulse: 55 (01/29/20 0717)  Temperature: (!) 97 4 °F (36 3 °C) (01/29/20 0717)  Temp Source: Temporal (01/29/20 0717)  Respirations: 16 (01/29/20 0717)  Height: 6' (182 9 cm) (01/29/20 0035)  Weight - Scale: 103 kg (226 lb 10 1 oz) (01/29/20 0035)  SpO2: 96 % (01/29/20 0717)    Physical Exam   Constitutional: He is oriented to person, place, and time  He appears well-developed and well-nourished  HENT:   Head: Normocephalic and atraumatic  Eyes: Pupils are equal, round, and reactive to light  EOM are normal    Neck: Normal range of motion  Cardiovascular: Normal rate, regular rhythm and normal heart sounds  Pulmonary/Chest: Effort normal and breath sounds normal    Abdominal: Soft  Bowel sounds are normal    Musculoskeletal: Normal range of motion  Neurological: He is alert and oriented to person, place, and time  He has normal reflexes     cranial nerve 2-12 are normal  Normal neurological exam chronic right lower extremity weakness   Skin: Skin is warm  Psychiatric: He has a normal mood and affect  Additional Data:     Lab Results: I have personally reviewed pertinent films in PACS    Results from last 7 days   Lab Units 01/28/20  1350   WBC Thousand/uL 9 06   HEMOGLOBIN g/dL 15 3   HEMATOCRIT % 46 7   PLATELETS Thousands/uL 203   NEUTROS PCT % 70   LYMPHS PCT % 18   MONOS PCT % 6   EOS PCT % 4     Results from last 7 days   Lab Units 01/28/20  1350   SODIUM mmol/L 142   POTASSIUM mmol/L 4 4   CHLORIDE mmol/L 108   CO2 mmol/L 26   BUN mg/dL 9   CREATININE mg/dL 1 08   ANION GAP mmol/L 8   CALCIUM mg/dL 8 9   ALBUMIN g/dL 3 3*   TOTAL BILIRUBIN mg/dL 0 50   ALK PHOS U/L 96   ALT U/L 17   AST U/L 15   GLUCOSE RANDOM mg/dL 96             Lab Results   Component Value Date/Time    HGBA1C 4 9 08/10/2019 05:41 AM    HGBA1C 7 6 08/15/2018    HGBA1C 6 0 04/24/2018 02:54 PM    HGBA1C 7 8 01/22/2018    HGBA1C 6 9 (H) 10/11/2017 04:36 PM    HGBA1C 7 0 (H) 05/11/2017 05:54 AM    HGBA1C 14 8 (H) 10/21/2015 05:36 AM               Imaging:  Not available    No orders to display       EKG, Pathology, and Other Studies Reviewed on Admission:   · EKG:  Not available to be ordered    ** Please Note: This note has been constructed using a voice recognition system   **
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
## 2020-01-29 NOTE — ASSESSMENT & PLAN NOTE
Wt Readings from Last 3 Encounters:   01/29/20 103 kg (226 lb 10 1 oz)   01/28/20 103 kg (227 lb)   01/28/20 103 kg (227 lb)       · It compensated she does not need any diuretics last EF he just had a stent placed is 55

## 2020-01-29 NOTE — NURSING NOTE
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  Since then I I have problems with my right leg, my back and the pain is awful  I was told to be glad I'm alive but look at how I have to live"  Affect is sad  Pt reported that this is his first psychiatric admission but he has been treated for depression  Also reports that he has neuropathy and pain in his buttocks and heels  Pt reported that yesterday when he went to the neurologist he was told that he could not been seen because they don't take his insurance  Stated, "I was told by my pain doctor to cancel the [facility] and go with [facility] which I did  Now my neurologist told me to sign up for [facility] and if I don't I can't get the medication for my botox  I wanted to give up  I guess I said the wrong thing"  Reported depression is "there" and anxiety is 4/4  Denies si  72 hour remains in effect  Affect is flat  Adjusting to the unit  Will continue to monitor and maintain q 7 min

## 2020-01-29 NOTE — ED NOTES
Patient agreeing to inpatient stay, 201 signed with CW in room        Michelle Shabazz RN  01/28/20 5192

## 2020-01-29 NOTE — ED NOTES
Wife taking wallet, and other personal belongings home with her  She is leaving the patients walker here  Asked patient if he would like me to clear meal tray  He declined at this time and stated he wanted to keep it all that he has to eat slowly       Juan Francisco Ramirze  01/28/20 819 Gabriel Cerda  01/28/20 1949

## 2020-01-29 NOTE — ED NOTES
Patient indicated to me and nurse, Tara Larios, that his wife says she needs him at home, and he will be stuck for the next three days, that she told him to say what he needs to say in order to get home, so that's what he's going to do in order to go home, to lie       Annalisa Mg  01/28/20 8667

## 2020-01-29 NOTE — PROGRESS NOTES
01/29/20 0800   Team Meeting   Meeting Type Daily Rounds   Team Members Present   Team Members Present Physician;Nurse;; Other (Discipline and Name); Occupational Therapist   Physician Team Member Dr Sree Menard Team Member [facility] CTR Management Team Member Jackie Stephens    OT Team Member Tom Andre    Other (Discipline and Name) Kate      Pt discussed at treatment team today, new admission, medications will be reviewed and adjusted accordingly

## 2020-01-29 NOTE — ED NOTES
Patient states " My wife is my backbone, with all I've been through, I don't need to be here " Speaking to wife, " You don't understand, I want to leave here and say nothing is wrong and go lay on the couch and take care of it" "You don't understand, It would be better for everyone if I wasn't here: Agitation increased       [place]  01/28/20 1926

## 2020-01-29 NOTE — CASE MANAGEMENT
Phone call placed to [facility] [phone] to confirm services  Patient is currently a patient there, but was about to have case closed due to not being seen since June of 2019  Patient did have a recent appointment in January where he was complaint  Patient currently scheduled for appointment March 13th  Spoke with John Chacon, willing to move appointment closer to discharge  Car Funez direct number is [phone]  Also given number for Marcelo Cole [phone]  Will need to call Korina Javier if patient is discharging before Monday to reschedule appointment

## 2020-01-29 NOTE — ASSESSMENT & PLAN NOTE
· Secondary to nerve injury  He is on morphine pump  He has an appointment with pain management on Monday  Also for constipation prevention continue Amisophieza  Has right lower extremity weakness secondary to the nerve injury

## 2020-01-29 NOTE — ED NOTES
Patient is accepted at [facility]  Patient is accepted by Dr Andrew Umanzro  per [facility] is arranged with C[facility]  Transportation is scheduled for 11:30 pm to 12 midnight  Patient may go to the floor after 11 :00 pm         Nurse report is to be called to [phone] prior to patient transfer

## 2020-01-30 PROBLEM — F33.1 MAJOR DEPRESSIVE DISORDER, RECURRENT, MODERATE (HCC): Chronic | Status: ACTIVE | Noted: 2020-01-30

## 2020-01-30 LAB
ATRIAL RATE: 60 BPM
P AXIS: 21 DEGREES
PR INTERVAL: 200 MS
QRS AXIS: -61 DEGREES
QRSD INTERVAL: 138 MS
QT INTERVAL: 462 MS
QTC INTERVAL: 462 MS
T WAVE AXIS: 1 DEGREES
VENTRICULAR RATE: 60 BPM

## 2020-01-30 PROCEDURE — 99232 SBSQ HOSP IP/OBS MODERATE 35: CPT | Performed by: PSYCHIATRY & NEUROLOGY

## 2020-01-30 PROCEDURE — 94760 N-INVAS EAR/PLS OXIMETRY 1: CPT

## 2020-01-30 PROCEDURE — 94660 CPAP INITIATION&MGMT: CPT

## 2020-01-30 PROCEDURE — 93010 ELECTROCARDIOGRAM REPORT: CPT | Performed by: INTERNAL MEDICINE

## 2020-01-30 RX ORDER — ARIPIPRAZOLE 2 MG/1
2 TABLET ORAL DAILY
Status: DISCONTINUED | OUTPATIENT
Start: 2020-01-30 | End: 2020-01-31 | Stop reason: HOSPADM

## 2020-01-30 RX ADMIN — GABAPENTIN 800 MG: 400 CAPSULE ORAL at 17:04

## 2020-01-30 RX ADMIN — ARIPIPRAZOLE 2 MG: 2 TABLET ORAL at 17:03

## 2020-01-30 RX ADMIN — CLOPIDOGREL BISULFATE 75 MG: 75 TABLET ORAL at 09:04

## 2020-01-30 RX ADMIN — BACLOFEN 10 MG: 10 TABLET ORAL at 17:05

## 2020-01-30 RX ADMIN — BACLOFEN 10 MG: 10 TABLET ORAL at 22:02

## 2020-01-30 RX ADMIN — FOLIC ACID 1 MG: 1 TABLET ORAL at 09:04

## 2020-01-30 RX ADMIN — ATORVASTATIN CALCIUM 40 MG: 40 TABLET, FILM COATED ORAL at 17:41

## 2020-01-30 RX ADMIN — GABAPENTIN 800 MG: 400 CAPSULE ORAL at 09:05

## 2020-01-30 RX ADMIN — FLUDROCORTISONE ACETATE 0.1 MG: 0.1 TABLET ORAL at 09:04

## 2020-01-30 RX ADMIN — SERTRALINE HYDROCHLORIDE 150 MG: 50 TABLET ORAL at 09:03

## 2020-01-30 RX ADMIN — TAMSULOSIN HYDROCHLORIDE 0.4 MG: 0.4 CAPSULE ORAL at 17:05

## 2020-01-30 RX ADMIN — CALCIUM 1 TABLET: 500 TABLET ORAL at 09:01

## 2020-01-30 RX ADMIN — ASPIRIN 81 MG 81 MG: 81 TABLET ORAL at 09:00

## 2020-01-30 RX ADMIN — BACLOFEN 10 MG: 10 TABLET ORAL at 09:01

## 2020-01-30 RX ADMIN — PANTOPRAZOLE SODIUM 40 MG: 40 TABLET, DELAYED RELEASE ORAL at 06:18

## 2020-01-30 RX ADMIN — MELATONIN 5000 UNITS: at 09:01

## 2020-01-30 NOTE — PROGRESS NOTES
[facility]  Psychiatrist's Progress Note    Patient Name: Jr Cheatham  MRN: [id number]  DOS: 01/30/20     Chief Complaint:  suicidal thoughts    Interval History: Per staff, patient has been calm, cooperative and visible on the unit  He went to groups with strong encouragement from staff  Patient reports mood improved  Denies suicidal thoughts  Slept with prn trazodone    Medication compliant  Adverse effects of medications: denies      Mental Status Exam [Per above +]  Appearance: limited grooming, fair hygiene; no abnormal involuntary movements noted  Behavior: calm, cooperative  Speech: wnl  Mood: depressed  Affect: congruent, stable, reactive  Thought process: linear, logical  Thought content: no delusions elicited; denies SI/HI  Perceptual disturbances: denies AH/VH  Cognition: oriented to all domains     Insight: fair  Judgement: improving      Current Facility-Administered Medications:     acetaminophen (TYLENOL) tablet 650 mg, 650 mg, Oral, Q4H PRN, Sher Perez MD    albuterol (PROVENTIL HFA,VENTOLIN HFA) inhaler 2 puff, 2 puff, Inhalation, Q6H PRN, Raissa Johnson MD    albuterol inhalation solution 1 25 mg, 1 25 mg, Nebulization, Q6H PRN, Raissa Johnson MD    aluminum-magnesium hydroxide-simethicone (MYLANTA) 200-200-20 mg/5 mL oral suspension 30 mL, 30 mL, Oral, Q4H PRN, Sher Perez MD    aspirin chewable tablet 81 mg, 81 mg, Oral, Daily, Raissa oJhnson MD, 81 mg at 01/30/20 0900    atorvastatin (LIPITOR) tablet 40 mg, 40 mg, Oral, Daily, Raissa Johnson MD    baclofen tablet 10 mg, 10 mg, Oral, TID, Raissa Johnson MD, 10 mg at 01/30/20 0901    benztropine (COGENTIN) injection 1 mg, 1 mg, Intramuscular, Q6H PRN, Sher Perez MD    benztropine (COGENTIN) tablet 1 mg, 1 mg, Oral, Q6H PRN, Sher Perez MD    calcium carbonate (OYSTER SHELL,OSCAL) 500 mg tablet 1 tablet, 1 tablet, Oral, Daily With Breakfast, Marco Duong MD, 1 tablet at 01/30/20 0901    cholecalciferol (VITAMIN D3) tablet 5,000 Units, 5,000 Units, Oral, Daily, Marco Duong MD, 5,000 Units at 01/30/20 0901    clopidogrel (PLAVIX) tablet 75 mg, 75 mg, Oral, Daily, Marco Duong MD, 75 mg at 01/30/20 3368    fludrocortisone (FLORINEF) tablet 0 1 mg, 0 1 mg, Oral, Daily, Marco Duong MD, 0 1 mg at 01/30/20 0904    fluticasone-vilanterol (BREO ELLIPTA) 200-25 MCG/INH inhaler 1 puff, 1 puff, Inhalation, Daily, Marco Duong MD    folic acid (FOLVITE) tablet 1 mg, 1 mg, Oral, Daily, Marco Duong MD, 1 mg at 01/30/20 0453    gabapentin (NEURONTIN) capsule 300 mg, 300 mg, Oral, PRN, Marco Duong MD    gabapentin (NEURONTIN) capsule 800 mg, 800 mg, Oral, BID, Marco Duong MD, 800 mg at 01/30/20 8699    haloperidol (HALDOL) tablet 5 mg, 5 mg, Oral, Q8H PRN, Ildefonso Bird MD    hydrOXYzine HCL (ATARAX) tablet 50 mg, 50 mg, Oral, Q6H PRN, Latonia Santos MD    LORazepam (ATIVAN) tablet 1 mg, 1 mg, Oral, Q6H PRN, Latonia Santos MD    lubiprostone (AMITIZA) capsule 24 mcg, 24 mcg, Oral, BID With Meals, Marco Duong MD    magnesium hydroxide (MILK OF MAGNESIA) 400 mg/5 mL oral suspension 30 mL, 30 mL, Oral, Daily PRN, Ildefonso Bird MD    nicotine polacrilex (NICORETTE) gum 2 mg, 2 mg, Oral, Q2H PRN, Ildefonso Bird MD    nitroglycerin (NITROSTAT) SL tablet 0 4 mg, 0 4 mg, Sublingual, Q5 Min PRN, Marco Duong MD    nystatin (MYCOSTATIN) cream 1 application, 1 application, Topical, BID, Marco Duong MD, 1 application at 49/56/14 1907    OLANZapine (ZyPREXA) IM injection 10 mg, 10 mg, Intramuscular, BID PRN, Ildefonso Bird MD    pantoprazole (PROTONIX) EC tablet 40 mg, 40 mg, Oral, Early Morning, Marco Duong MD, 40 mg at 01/30/20 8124    risperiDONE (RisperDAL M-TABS) dispersible tablet 1 mg, 1 mg, Oral, Q8H PRN, MD Soto Monreal sertraline (ZOLOFT) tablet 150 mg, 150 mg, Oral, Daily, Wally King MD, 150 mg at 01/30/20 0903    tamsulosin (FLOMAX) capsule 0 4 mg, 0 4 mg, Oral, Daily With Kermit Merino MD, 0 4 mg at 01/29/20 1732    traZODone (DESYREL) tablet 200 mg, 200 mg, Oral, HS PRN, Wally King MD, 200 mg at 01/29/20 2347    Vitals:    01/30/20 0704   BP: 130/61   Pulse: (!) 53   Resp: 16   Temp: 97 8 °F (36 6 °C)   SpO2: 97%       Lab/work up: none    Assessment:     Axis I:  - Major depressive disorder, recurrent, moderate (HCC) (principle dx)  - PTSD   Axis II:  -deferred  Axis III:  - CVA 2005, CAD, s/p gastric bypass, diabetic polyneuropathy (h/o DMII but resolved after weight loss), chronic pain s/p complication of neurointerventional procedure, chronic migraines (on botox injections)  Axis IV:  - chronic, financial problems, conflicts with family    Progress: improving    Plan:   1  Disposition: Patient will be discharged to home when there is an absence of suicidal thoughts [id number]  2  Legal status: voluntary   3  Psychopharmacologic interventions:  - Continue sertraline 150mg po daily  - Add abilify 2mg po daily  - Continue to monitor suicidal thoughts  4  Medical comorbidities: Hospitalist following as needed  5  Other therapies: Individual/group/milieu therapy as appropriate  6  Psychoeducation: Discussed risks and benefits of medications at initiation of therapy; patient/family verbalized understanding and agreed with plan  7  Social issues: will be arrange outpatient psychotherapy  8  Work up: none  9   Precautions: Routine q7min checks, vitals qshift      Kris Delgadillo MD  01/30/20

## 2020-01-30 NOTE — PROGRESS NOTES
01/30/20 0800   Team Meeting   Meeting Type Daily Rounds   Team Members Present   Team Members Present Physician;Nurse;; Other (Discipline and Name); Occupational Therapist   Physician Team Member Dr Azul Soto Team Member [facility] CTR Management Team Member Debbie Nunez    OT Team Member Ricco Huynh    Other (Discipline and Name) Kate      Pt discussed at treatment team today, Zoloft increased yesterday and will start Abilify

## 2020-01-30 NOTE — CASE MANAGEMENT
Met with pt to discuss possible discharge for 1/31  He is agreeable to attend and participate in all groups  He was bright on approach and agreeable to the above  Pt is aware he is now on the wait list for a therapist at the [place] [address]  He is also aware he has an extended visit with Dr Sasha Caballero on 2/4 at 10:15  Pt plans to ask his son to pick him up on 1/31 at 11:30 to take him home

## 2020-01-30 NOTE — PROGRESS NOTES
01/30/20 1100   Service   Service SLIM   Provider Name Dr Santo Gaudy [place] [facility])   Multi-disciplinary Rounds   Pending Pathology and Pertinent Tests Most recent lab data reviewed  (Discussed pt  stating that he needs KCL 40mEq BID  )   Elimination/Bowel Regimen Reviewed  (Discussed pt's refusal of amitize)   Level of care Current level of care is appropriate   Expected Discharge Date 01/30/20   Discussed pt's request for KCL and she stated that pt should resume tomorrow upon discharge  Also informed her that pt did not want amitize and has not had a BM  She requested that further education of the same be provided to pt

## 2020-01-30 NOTE — PLAN OF CARE
educational groups  Description  Interventions:  - Provide therapeutic and educational activities daily, encourage attendance and participation, and document same in the medical record   Outcome: Progressing  Goal: Complete daily ADLs, including personal hygiene independently, as able  Description  Interventions:  - Observe, teach, and assist patient with ADLS  -  Monitor and promote a balance of rest/activity, with adequate nutrition and elimination   Outcome: Progressing     Problem: Anxiety  Goal: Anxiety is at manageable level  Description  Interventions:  - Assess and monitor patient's anxiety level  - Monitor for signs and symptoms (heart palpitations, chest pain, shortness of breath, headaches, nausea, feeling jumpy, restlessness, irritable, apprehensive)  - Collaborate with interdisciplinary team and initiate plan and interventions as ordered    - [place] patient to unit/surroundings  - Explain treatment plan  - Encourage participation in care  - Encourage verbalization of concerns/fears  - Identify coping mechanisms  - Assist in developing anxiety-reducing skills  - Administer/offer alternative therapies  - Limit or eliminate stimulants  Outcome: Progressing     Problem: Prexisting or High Potential for Compromised Skin Integrity  Goal: Skin integrity is maintained or improved  Description  INTERVENTIONS:  - Identify patients at risk for skin breakdown  - Assess and monitor skin integrity  - Assess and monitor nutrition and hydration status  - Monitor labs   - Assess for incontinence   - Turn and reposition patient  - Assist with mobility/ambulation  - Relieve pressure over bony prominences  - Avoid friction and shearing  - Provide appropriate hygiene as needed including keeping skin clean and dry  - Evaluate need for skin moisturizer/barrier cream  - Collaborate with interdisciplinary team   - Patient/family teaching  - Consider wound care consult   Outcome: Progressing     Problem: DISCHARGE PLANNING  Goal: Discharge to home or other facility with appropriate resources  Description  INTERVENTIONS:  - Identify barriers to discharge w/patient and caregiver  - Arrange for needed discharge resources and transportation as appropriate  - Identify discharge learning needs (meds, wound care, etc )  - Arrange for interpretive services to assist at discharge as needed  - Refer to Case Management Department for coordinating discharge planning if the patient needs post-hospital services based on physician/advanced practitioner order or complex needs related to functional status, cognitive ability, or social support system  Outcome: Progressing

## 2020-01-30 NOTE — PROGRESS NOTES
Pt attended  relapse prevention planning group  Pt completed and signed  relapse plan  Crisis and warmline phone numbers provided  Copy in chart  Continue to provide therepuetic group support  01/30/20 1000   Activity/Group Checklist   Group Other (Comment)  ( relpase plan)   Attendance Attended   Attendance Duration (min) 31-45   Interactions Interacted appropriately   Affect/Mood Appropriate   Goals Achieved Identified feelings; Identified relapse prevention strategies; Discussed coping strategies; Able to listen to others; Able to engage in interactions; Able to reflect/comment on own behavior;Able to self-disclose

## 2020-01-30 NOTE — PROGRESS NOTES
01/30/20 1100   Activity/Group Checklist   Group Exercise  (seated light n lively program)   Attendance Attended   Attendance Duration (min) 16-30   Interactions Interacted appropriately   Affect/Mood Appropriate;Normal range   Goals Achieved Able to engage in interactions

## 2020-01-30 NOTE — CMS CERTIFICATION NOTE
Recertification: Based upon physical, mental and social evaluations, I certify that inpatient psychiatric services continue to be medically necessary for this patient for a duration of 7 midnights for the treatment of  Major depressive disorder, recurrent, moderate ([facility] [address] )   Available alternative community resources still do not meet the patient's mental health care needs  I further attest that an established written individualized plan of care has been updated and is outlined in the patient's medical records

## 2020-01-30 NOTE — TREATMENT PLAN
TREATMENT PLAN REVIEW - [address] [place] [address]/Critical access hospital Services 64 y o  1960 male MRN: [id number]    [address] Bed: Silviano Kruger2/-14 Encounter: [id number]          Admit Date/Time:  1/29/2020 12:28 AM    Treatment Team: Attending Provider: Gaudencio Salinas MD; Registered Nurse: Tapan Devlin; Occupational Therapist: Adam Malcolm OT; Patient Care Assistant: Dimple Palafox; Patient Care Assistant: Juan Frank; Registered Nurse: Brina Ordonez, RN; Therapy Student: Roshan Hughes; Occupational Therapy Assistant: Perry Kat; Patient Care Assistant: Boyd Torres; Registered Nurse:  Jean Lee RN; Patient Care Technician: Isidro Gan    Diagnosis: Principal Problem:    Major depressive disorder, recurrent, moderate (HCC)  Active Problems:    Chronic diastolic congestive heart failure (HCC)    Coronary artery disease involving native coronary artery    CVA (cerebral vascular accident) ([facility] [address] )    CPAP (continuous positive airway pressure) dependence    Chronic pain disorder    GERD (gastroesophageal reflux disease)    Opioid dependence ([facility] )    Chronic migraine without aura without status migrainosus, not intractable    Hyperlipidemia    Vitamin D deficiency    Orthostatic hypotension    Type 2 diabetes mellitus with diabetic neuropathy, with long-term current use of insulin ([facility])    COPD (chronic obstructive pulmonary disease) ([facility] )      Patient Strengths/Assets: ability for insight    Patient Barriers/Limitations: difficulty adapting    Short Term Goals: decrease in depressive symptoms, decrease in suicidal thoughts    Long Term Goals: improvement in depression, free of suicidal thoughts    Progress Towards Goals: starting psychiatric medications as prescribed    Recommended Treatment: medication management, patient medication education, group therapy, milieu therapy, continued Behavioral Health psychiatric evaluation/assessment process    Treatment Frequency: daily medication monitoring, group and milieu therapy daily, monitoring through interdisciplinary rounds, monitoring through weekly patient care conferences    Expected Discharge Date:   In 7 days    Discharge Plan: discharge to home    Treatment Plan Created/Updated By: Gaudencio Salinas MD

## 2020-01-30 NOTE — NURSING NOTE
Pt initially seclusive to his room  At 0900 asked if he could have his medication "later" as he wanted to sleep  Pt initially refusing groups then was told that it was necessary to be discharged and then attended  Pt was engaging in group  Visible and interacting with peers  Continues to report pain of 8/10 which is "all the time"  Denies si/hi, no psychosis  Denies depression or anxiety  Spoke to Dr Minesh Mullins about K that pt was taking at home and informed pt as per her direction to resume upon d/c  Pt agreeable with the same  Affect is brighter  Pt denies having a BM but stated that he can not take amitiza as ordered  Reported that he has dumping syndrome s/p gastric bypass and can not control his BM  Support provided  Will continue to monitor and maintain q 7 min checks

## 2020-01-31 ENCOUNTER — TRANSCRIBE ORDERS (OUTPATIENT)
Dept: NEUROLOGY | Facility: CLINIC | Age: 60
End: 2020-01-31

## 2020-01-31 ENCOUNTER — HOSPITAL ENCOUNTER (OUTPATIENT)
Dept: RADIOLOGY | Facility: HOSPITAL | Age: 60
Discharge: HOME | End: 2020-01-31
Payer: MEDICARE

## 2020-01-31 VITALS
RESPIRATION RATE: 17 BRPM | BODY MASS INDEX: 30.7 KG/M2 | HEIGHT: 72 IN | WEIGHT: 226.63 LBS | HEART RATE: 60 BPM | OXYGEN SATURATION: 99 % | SYSTOLIC BLOOD PRESSURE: 124 MMHG | DIASTOLIC BLOOD PRESSURE: 60 MMHG | TEMPERATURE: 97.9 F

## 2020-01-31 DIAGNOSIS — G43.709 CHRONIC MIGRAINE WITHOUT AURA, NOT INTRACTABLE, WITHOUT STATUS MIGRAINOSUS: Primary | ICD-10-CM

## 2020-01-31 DIAGNOSIS — G31.84 MILD COGNITIVE IMPAIRMENT, SO STATED: ICD-10-CM

## 2020-01-31 DIAGNOSIS — I63.9 CEREBRAL INFARCTION, UNSPECIFIED (HCC): ICD-10-CM

## 2020-01-31 DIAGNOSIS — Z46.6 ENCOUNTER FOR URETERAL CATHETER PLACEMENT: ICD-10-CM

## 2020-01-31 DIAGNOSIS — I67.1 CEREBRAL ANEURYSM, NONRUPTURED: ICD-10-CM

## 2020-01-31 PROCEDURE — 94660 CPAP INITIATION&MGMT: CPT

## 2020-01-31 PROCEDURE — A9585 GADOBUTROL INJECTION: HCPCS | Performed by: ANESTHESIOLOGY

## 2020-01-31 PROCEDURE — 72157 MRI CHEST SPINE W/O & W/DYE: CPT

## 2020-01-31 PROCEDURE — 99238 HOSP IP/OBS DSCHRG MGMT 30/<: CPT | Performed by: PSYCHIATRY & NEUROLOGY

## 2020-01-31 PROCEDURE — 94760 N-INVAS EAR/PLS OXIMETRY 1: CPT

## 2020-01-31 RX ORDER — TRAZODONE HYDROCHLORIDE 100 MG/1
200 TABLET ORAL
Qty: 14 TABLET | Refills: 0 | Status: SHIPPED | OUTPATIENT
Start: 2020-01-31

## 2020-01-31 RX ORDER — ARIPIPRAZOLE 2 MG/1
2 TABLET ORAL DAILY
Qty: 7 TABLET | Refills: 0 | Status: SHIPPED | OUTPATIENT
Start: 2020-02-01 | End: 2020-08-21 | Stop reason: ALTCHOICE

## 2020-01-31 RX ORDER — SERTRALINE HYDROCHLORIDE 100 MG/1
100 TABLET, FILM COATED ORAL DAILY
Status: ON HOLD | COMMUNITY
End: 2020-01-31 | Stop reason: SDUPTHER

## 2020-01-31 RX ORDER — MEMANTINE HYDROCHLORIDE 10 MG/1
10 TABLET ORAL 2 TIMES DAILY
COMMUNITY
End: 2020-01-31 | Stop reason: HOSPADM

## 2020-01-31 RX ORDER — HYDROXYZINE HYDROCHLORIDE 25 MG/1
75 TABLET, FILM COATED ORAL 2 TIMES DAILY PRN
COMMUNITY

## 2020-01-31 RX ORDER — SERTRALINE HYDROCHLORIDE 100 MG/1
150 TABLET, FILM COATED ORAL DAILY
Refills: 0
Start: 2020-01-31

## 2020-01-31 RX ORDER — OMEPRAZOLE 40 MG/1
40 CAPSULE, DELAYED RELEASE ORAL DAILY
COMMUNITY

## 2020-01-31 RX ORDER — ASPIRIN 81 MG/1
81 TABLET ORAL DAILY
COMMUNITY
End: 2020-07-09 | Stop reason: HOSPADM

## 2020-01-31 RX ADMIN — BACLOFEN 10 MG: 10 TABLET ORAL at 08:47

## 2020-01-31 RX ADMIN — GADOBUTROL 10 ML: 604.72 INJECTION INTRAVENOUS at 14:14

## 2020-01-31 RX ADMIN — GABAPENTIN 800 MG: 400 CAPSULE ORAL at 08:51

## 2020-01-31 RX ADMIN — SERTRALINE HYDROCHLORIDE 150 MG: 50 TABLET ORAL at 08:52

## 2020-01-31 RX ADMIN — FLUDROCORTISONE ACETATE 0.1 MG: 0.1 TABLET ORAL at 08:50

## 2020-01-31 RX ADMIN — ARIPIPRAZOLE 2 MG: 2 TABLET ORAL at 08:46

## 2020-01-31 RX ADMIN — CALCIUM 1 TABLET: 500 TABLET ORAL at 08:48

## 2020-01-31 RX ADMIN — FOLIC ACID 1 MG: 1 TABLET ORAL at 08:51

## 2020-01-31 RX ADMIN — MELATONIN 5000 UNITS: at 08:48

## 2020-01-31 RX ADMIN — ASPIRIN 81 MG 81 MG: 81 TABLET ORAL at 08:47

## 2020-01-31 RX ADMIN — CLOPIDOGREL BISULFATE 75 MG: 75 TABLET ORAL at 08:50

## 2020-01-31 RX ADMIN — TRAZODONE HYDROCHLORIDE 200 MG: 100 TABLET ORAL at 01:04

## 2020-01-31 RX ADMIN — PANTOPRAZOLE SODIUM 40 MG: 40 TABLET, DELAYED RELEASE ORAL at 06:22

## 2020-01-31 NOTE — PROGRESS NOTES
01/31/20 1300   Service   Service   (Psychiatriy)   Provider Name Dr Fina Rivera   Multi-disciplinary Rounds   Patient Education Other (comment)  (discharge medications)   Expected Discharge Date 01/31/20     Discussed medication on AVS and Dr Fina Rivera stated that pt should take all medication as previously prescribed  Informed pt of the same

## 2020-01-31 NOTE — NURSING NOTE
Pt was pleasant upon approach, medication compliant, denied anxiety, depression, SI, HI, feels safe here and ready to go home tomorrow

## 2020-01-31 NOTE — PLAN OF CARE
Problem: Ineffective Coping  Goal: Identifies ineffective coping skills  Outcome: Completed  Goal: Identifies healthy coping skills  Outcome: Completed  Goal: Demonstrates healthy coping skills  Outcome: Completed  Goal: Participates in unit activities  Description  Interventions:  - Provide therapeutic environment   - Provide required programming   - Redirect inappropriate behaviors   Outcome: Completed  Goal: Patient/Family participate in treatment and DC plans  Description  Interventions:  - Provide therapeutic environment  Outcome: Completed  Goal: Patient/Family verbalizes awareness of resources  Outcome: Completed  Goal: Understands least restrictive measures  Description  Interventions:  - Utilize least restrictive behavior  Outcome: Completed  Goal: Free from restraint events  Description  - Utilize least restrictive measures   - Provide behavioral interventions   - Redirect inappropriate behaviors   Outcome: Completed     Problem: Depression  Goal: Treatment Goal: Demonstrate behavioral control of depressive symptoms, verbalize feelings of improved mood/affect, and adopt new coping skills prior to discharge  Outcome: Completed  Goal: Verbalize thoughts and feelings  Description  Interventions:  - Assess and re-assess patient's level of risk   - Engage patient in 1:1 interactions, daily, for a minimum of 15 minutes   - Encourage patient to express feelings, fears, frustrations, hopes   Outcome: Completed  Goal: Refrain from harming self  Description  Interventions:  - Monitor patient closely, per order   - Supervise medication ingestion, monitor effects and side effects   Outcome: Completed  Goal: Refrain from isolation  Description  Interventions:  - Develop a trusting relationship   - Encourage socialization   Outcome: Completed  Goal: Refrain from self-neglect  Outcome: Completed  Goal: Attend and participate in unit activities, including therapeutic, recreational, and educational groups  Description  Interventions:  - Provide therapeutic and educational activities daily, encourage attendance and participation, and document same in the medical record   Outcome: Completed  Goal: Complete daily ADLs, including personal hygiene independently, as able  Description  Interventions:  - Observe, teach, and assist patient with ADLS  -  Monitor and promote a balance of rest/activity, with adequate nutrition and elimination   Outcome: Completed     Problem: Anxiety  Goal: Anxiety is at manageable level  Description  Interventions:  - Assess and monitor patient's anxiety level  - Monitor for signs and symptoms (heart palpitations, chest pain, shortness of breath, headaches, nausea, feeling jumpy, restlessness, irritable, apprehensive)  - Collaborate with interdisciplinary team and initiate plan and interventions as ordered    - [place] patient to unit/surroundings  - Explain treatment plan  - Encourage participation in care  - Encourage verbalization of concerns/fears  - Identify coping mechanisms  - Assist in developing anxiety-reducing skills  - Administer/offer alternative therapies  - Limit or eliminate stimulants  Outcome: Completed     Problem: Prexisting or High Potential for Compromised Skin Integrity  Goal: Skin integrity is maintained or improved  Description  INTERVENTIONS:  - Identify patients at risk for skin breakdown  - Assess and monitor skin integrity  - Assess and monitor nutrition and hydration status  - Monitor labs   - Assess for incontinence   - Turn and reposition patient  - Assist with mobility/ambulation  - Relieve pressure over bony prominences  - Avoid friction and shearing  - Provide appropriate hygiene as needed including keeping skin clean and dry  - Evaluate need for skin moisturizer/barrier cream  - Collaborate with interdisciplinary team   - Patient/family teaching  - Consider wound care consult   Outcome: Completed     Problem: DISCHARGE PLANNING  Goal: Discharge to home or other facility with appropriate resources  Description  INTERVENTIONS:  - Identify barriers to discharge w/patient and caregiver  - Arrange for needed discharge resources and transportation as appropriate  - Identify discharge learning needs (meds, wound care, etc )  - Arrange for interpretive services to assist at discharge as needed  - Refer to Case Management Department for coordinating discharge planning if the patient needs post-hospital services based on physician/advanced practitioner order or complex needs related to functional status, cognitive ability, or social support system  Outcome: Completed

## 2020-01-31 NOTE — PROGRESS NOTES
01/31/20 1100   Activity/Group Checklist   Group Wellness  (art relaxation )   Attendance Attended   Attendance Duration (min) 16-30   Interactions Interacted appropriately   Affect/Mood Bright   Goals Achieved Able to engage in interactions

## 2020-01-31 NOTE — PROGRESS NOTES
01/31/20 0800   Team Meeting   Meeting Type Daily Rounds   Team Members Present   Team Members Present Physician;Nurse;; Other (Discipline and Name)   Physician Team Member Dr Irma Briceño Team Member [facility] Management Team Member Aurora Bhagat    Other (Discipline and Name) Inell Solum      Pt discussed in treatment team today, no medication changes made, planned discharge this morning

## 2020-01-31 NOTE — NURSING NOTE
Pt discharged from the unit on this day  Upon discharge, pt denid si/hi, no psychosis  Denied depression and reported anxiety as "low"  Affect bright  Reviewed discharge instructions, prescriptions, and aftercare appointments  Spoke to Dr Constance Turner concerning the medication listed on the AVS and she stated that pt is to resume all previously prescribed medical medications  Informed pt of the same and he stated, "yeah that sounds good  Eudelia Monroe been on that stuff for a long time  I got it"  Pt in agreement with discharge  No questions verbalized  All belongings returned  Pt accompanied to the lobby by Justyna Castro and son was waiting to transport home

## 2020-02-15 ENCOUNTER — HOSPITAL ENCOUNTER (EMERGENCY)
Facility: HOSPITAL | Age: 60
Discharge: HOME | End: 2020-02-15
Attending: EMERGENCY MEDICINE
Payer: MEDICARE

## 2020-02-15 VITALS
OXYGEN SATURATION: 97 % | RESPIRATION RATE: 18 BRPM | DIASTOLIC BLOOD PRESSURE: 79 MMHG | HEART RATE: 75 BPM | TEMPERATURE: 98.5 F | BODY MASS INDEX: 30.56 KG/M2 | SYSTOLIC BLOOD PRESSURE: 137 MMHG | WEIGHT: 225.31 LBS

## 2020-02-15 DIAGNOSIS — S10.91XA ABRASION OF NECK, INITIAL ENCOUNTER: ICD-10-CM

## 2020-02-15 DIAGNOSIS — S41.151A: ICD-10-CM

## 2020-02-15 DIAGNOSIS — S01.512A LACERATION OF TONGUE, INITIAL ENCOUNTER: ICD-10-CM

## 2020-02-15 DIAGNOSIS — S40.811A ABRASION OF RIGHT UPPER EXTREMITY, INITIAL ENCOUNTER: ICD-10-CM
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
## 2020-02-15 PROCEDURE — 99283 EMERGENCY DEPT VISIT LOW MDM: CPT

## 2020-02-15 PROCEDURE — 90715 TDAP VACCINE 7 YRS/> IM: CPT | Performed by: EMERGENCY MEDICINE

## 2020-02-15 PROCEDURE — 90471 IMMUNIZATION ADMIN: CPT

## 2020-02-15 PROCEDURE — 99283 EMERGENCY DEPT VISIT LOW MDM: CPT | Performed by: EMERGENCY MEDICINE

## 2020-02-15 RX ORDER — AMOXICILLIN AND CLAVULANATE POTASSIUM 875; 125 MG/1; MG/1
1 TABLET, FILM COATED ORAL ONCE
Status: COMPLETED | OUTPATIENT
Start: 2020-02-15 | End: 2020-02-15

## 2020-02-15 RX ORDER — CHLORHEXIDINE GLUCONATE 0.12 MG/ML
15 RINSE ORAL 2 TIMES DAILY
Qty: 120 ML | Refills: 0 | Status: SHIPPED | OUTPATIENT
Start: 2020-02-15 | End: 2020-03-23

## 2020-02-15 RX ORDER — AMOXICILLIN AND CLAVULANATE POTASSIUM 875; 125 MG/1; MG/1
1 TABLET, FILM COATED ORAL EVERY 12 HOURS
Qty: 14 TABLET | Refills: 0 | Status: SHIPPED | OUTPATIENT
Start: 2020-02-15 | End: 2020-02-22

## 2020-02-15 RX ORDER — AMOXICILLIN AND CLAVULANATE POTASSIUM 875; 125 MG/1; MG/1
1 TABLET, FILM COATED ORAL EVERY 12 HOURS
Qty: 14 TABLET | Refills: 0 | Status: SHIPPED | OUTPATIENT
Start: 2020-02-15 | End: 2020-02-15 | Stop reason: CLARIF

## 2020-02-15 RX ORDER — CHLORHEXIDINE GLUCONATE 0.12 MG/ML
15 RINSE ORAL ONCE
Status: DISCONTINUED | OUTPATIENT
Start: 2020-02-15 | End: 2020-02-16 | Stop reason: HOSPADM

## 2020-02-15 RX ORDER — CHLORHEXIDINE GLUCONATE 0.12 MG/ML
15 RINSE ORAL 2 TIMES DAILY
Qty: 120 ML | Refills: 0 | Status: SHIPPED | OUTPATIENT
Start: 2020-02-15 | End: 2020-02-15 | Stop reason: CLARIF

## 2020-02-15 RX ADMIN — AMOXICILLIN AND CLAVULANATE POTASSIUM 1 TABLET: 875; 125 TABLET, FILM COATED ORAL at 21:52

## 2020-02-15 RX ADMIN — TETANUS TOXOID, REDUCED DIPHTHERIA TOXOID AND ACELLULAR PERTUSSIS VACCINE, ADSORBED 0.5 ML: 5; 2.5; 8; 8; 2.5 SUSPENSION INTRAMUSCULAR at 21:52
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
## 2020-02-16 NOTE — DISCHARGE INSTRUCTIONS
Schedule an appointment with your primary care doctor in 48 hours for recheck of your wounds  Scheduled of with with ENT for evaluation of your tongue laceration, take antibiotics as prescribed, use the Peridex mouthwash as prescribed return to the emergency department with any worsening symptoms
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
## 2020-02-18 ENCOUNTER — TRANSCRIBE ORDERS (OUTPATIENT)
Dept: NEUROSURGERY | Facility: CLINIC | Age: 60
End: 2020-02-18

## 2020-02-18 DIAGNOSIS — G89.4 CHRONIC PAIN SYNDROME: Primary | ICD-10-CM

## 2020-03-15 NOTE — PROGRESS NOTES
Established Patient Progress Note      Chief Complaint   Patient presents with    Diabetes Type 2          History of Present Illness:   Cheng Tran is a 56 y o  male with a history of type 2 diabetes with long term use of insulin since about 2016  Stephania Nielson Reports complications of CKD and neuropathy  He has had a couple low blood sugars since last visit mostly have been related to use of protein shake meal replacements in prep for bariatric surgery  Surgery now on hold until fall due to NSTEMI with PCI in 10/2017 and need to remain on plavix until 10/2018  Home glucose monitoring: are performed regularly 4x per day  Most blood sugar readings are within target with occasional highs and lows  Current regimen: Novolog 34-30-34 before meals and Tresiba 120 units daily  Has been off jardiance due to CKD  Last Eye Exam: Eye appt 5/30/18  Last Foot Exam: Dr Coretta Guillory last week, taking gabapentin for neuropathy     Has hypertension: Taking amlodipine  Has hyperlipidemia: Taking atorvastatin    Vitamin D Deficiency taking 5,000 units daily  Follows with npehrology Dr Shamika Molina      Patient Active Problem List   Diagnosis    Hypertension    Type 2 diabetes mellitus with renal complication (HCC)    Chronic diastolic congestive heart failure ([address]    Coronary artery disease involving native coronary artery    Morbid obesity ([address]    CVA (cerebral vascular accident) ([address]    CHF (congestive heart failure) ([address]    Stroke ([address]    Stented coronary artery    Obstructive sleep apnea syndrome    MI (myocardial infarction) ([address]    Depression    CPAP (continuous positive airway pressure) dependence    Brain aneurysm    Hypokalemia    Alzheimer disease    Acute on chronic diastolic congestive heart failure (HCC)    Chronic pain disorder    Constipation    Coronary artery disease    Diabetes mellitus (HCC)    Migraine    Sleep apnea    Acute tracheobronchitis    Dyspnea on exertion    Bilateral leg edema    Chronic respiratory failure (HCC)    Chest pain    Stage 3 chronic kidney disease    Leukocytosis    GERD (gastroesophageal reflux disease)    Acute renal failure superimposed on stage 3 chronic kidney disease (HCC)    Opioid dependence ([facility])    Esophageal dysphagia    Chronic migraine without aura without status migrainosus, not intractable    Hyperlipidemia      Past Medical History:   Diagnosis Date    Acute on chronic diastolic congestive heart failure (HCC)     Altered gait     Alzheimer disease     per patients,,early onset     [facility] p[facility]is [facility]     Anxiety     Arthritis     Brain aneurysm     Cardiac disease     Chest pain 1/13/2016    Chronic pain     back/ right groin and rle- has morphine pump    Constipation     COPD (chronic obstructive pulmonary disease) ([facility])     Coronary artery disease     CPAP (continuous positive airway pressure) dependence     Dependent on walker for ambulation     w/c for long distance    Depression     Diabetes mellitus ([facility])     insulin dependent    Dizziness     occ    Enlarged prostate     GERD (gastroesophageal reflux disease)     Heart failure ([facility])     Hiatal hernia     Hypercholesterolemia     Hypertension     MI [facility] infarction) [facility]     Migraine     Neuropathy     Oxygen dependent     Q HS  2LPM with CPAP and prn during day 2-3 LPM     Shortness of breath     Sleep apnea     Stented coronary artery     Stroke ([facility] )     vision loss    Urinary frequency     Wears dentures     Wears glasses       Past Surgical History:   Procedure Laterality Date    BACK SURGERY      BRAIN SURGERY      CARDIAC CATHETERIZATION      with stents    CEREBRAL ANEURYSM REPAIR      with coils    COLONOSCOPY      ESOPHAGOGASTRODUODENOSCOPY N/A 7/1/2016    Procedure: ESOPHAGOGASTRODUODENOSCOPY (EGD); Surgeon: Judy Bustillos MD;  [facility];   Service:     HERNIA REPAIR      HIATAL HERNIA REPAIR      KNEE ARTHROSCOPY Right     KNEE ARTHROSCOPY Right     PERONEAL NERVE DECOMPRESSION Right     WA ESOPHAGOGASTRODUODENOSCOPY TRANSORAL DIAGNOSTIC N/A 2/27/2017    Procedure: ESOPHAGOGASTRODUODENOSCOPY (EGD); Surgeon: Pablito Powers MD;  [facility]; Service: Gastroenterology    WA INSERT SPINE INFUSN [address] [place] [address] N/A 1/19/2017    Procedure: REMOVAL / Patrici Mirian;  Surgeon: Temo Urrutia MD;  [facility];  Service: Orthopedics    WA INSERT SPINE INFUSN [address] [place] [address] N/A 5/16/2016    Procedure: REPLACEMENT AND PROGRAM PUMP ;  Surgeon: Temo Urrutia MD;  [facility];  Service: Orthopedics      Family History   Problem Relation Age of Onset    Diabetes unspecified Mother     Diabetes unspecified Brother     Diabetes unspecified Paternal Uncle     Diabetes unspecified Maternal Grandmother     Diabetes unspecified Paternal Grandmother     Diabetes unspecified Brother      Social History   Substance Use Topics    Smoking status: Never Smoker    Smokeless tobacco: Never Used    Alcohol use No     No Known Allergies      Current Outpatient Prescriptions:     albuterol (ACCUNEB) 1 25 MG/3ML nebulizer solution, Take 1 ampule by nebulization every 6 (six) hours as needed for wheezing, Disp: , Rfl:     albuterol (PROVENTIL HFA,VENTOLIN HFA) 90 mcg/act inhaler, Inhale 2 puffs every 6 (six) hours as needed for wheezing, Disp: , Rfl:     amLODIPine (NORVASC) 5 mg tablet, Take 1 tablet (5 mg total) by mouth daily, Disp: 30 tablet, Rfl: 5    aspirin 81 MG tablet, Take 81 mg by mouth daily  , Disp: , Rfl:     atorvastatin (LIPITOR) 40 mg tablet, Take 40 mg by mouth daily  , Disp: , Rfl:     baclofen 10 mg tablet, Take 10 mg by mouth 3 (three) times a day, Disp: , Rfl:     Cholecalciferol (VITAMIN D3) 5000 units CAPS, Take 1 capsule (5,000 Units total) by mouth daily, Disp: 30 capsule, Rfl: 6    citalopram (CeleXA) 40 mg tablet, Take 40 mg by mouth daily  , Disp: , Rfl:    clopidogrel (PLAVIX) 75 mg tablet, Take 75 mg by mouth daily  , Disp: , Rfl:     dicyclomine (BENTYL) 20 mg tablet, Take 10 mg by mouth every 6 (six) hours as needed, Disp: , Rfl:     fluticasone-salmeterol (ADVAIR) 250-50 mcg/dose, Inhale 1 puff 2 (two) times a day , Disp: , Rfl:     gabapentin (NEURONTIN) 800 mg tablet, Take 800 mg by mouth 2 (two) times a day, Disp: , Rfl:     insulin aspart (NOVOLOG FLEXPEN) 100 Units/mL SOPN, 34 before breakfast, 30 before lunch, 34 before dinner, Disp: 30 pen, Rfl: 1    Insulin Degludec (TRESIBA FLEXTOUCH) 200 UNIT/ML SOPN, Inject 120 Units under the skin daily at bedtime  , Disp: , Rfl:     isosorbide mononitrate (IMDUR) 30 mg 24 hr tablet, Take 1 tablet by mouth daily, Disp: 30 tablet, Rfl: 0    lubiprostone (AMITIZA) 24 mcg capsule, Take 24 mcg by mouth 2 (two) times a day with meals  , Disp: , Rfl:     memantine (NAMENDA) 10 mg tablet, Take 10 mg by mouth daily, Disp: , Rfl:     morphine (MS CONTIN) 100 mg 12 hr tablet, Take 100 mg by mouth 2 (two) times a day  , Disp: , Rfl:     nebivolol (BYSTOLIC) 10 mg tablet, Take 10 mg by mouth daily  , Disp: , Rfl:     nitroglycerin (NITROSTAT) 0 4 mg SL tablet, Place 0 4 mg under the tongue every 5 (five) minutes as needed for chest pain (pt has not  used recently)  , Disp: , Rfl:     omeprazole (PriLOSEC) 40 MG capsule, Take 1 capsule (40 mg total) by mouth daily, Disp: 90 capsule, Rfl: 3    oxyCODONE (ROXICODONE) 30 MG immediate release tablet, Take 30 mg by mouth every 4 (four) hours as needed for moderate pain  , Disp: , Rfl:     polyethylene glycol (MIRALAX) 17 g packet, Take 17 g by mouth daily  , Disp: , Rfl:     potassium chloride (K-DUR,KLOR-CON) 10 mEq tablet, Take 2 tablets by mouth 3 (three) times a day, Disp: 90 tablet, Rfl: 0    ranolazine (RANEXA) 1000 MG SR tablet, Take 1,000 mg by mouth 2 (two) times a day , Disp: , Rfl:     tamsulosin (FLOMAX) 0 4 mg, Take 0 4 mg by mouth daily with dinner  , Disp: , Rfl:     TiZANidine (ZANAFLEX) 2 MG capsule, Take 2 mg by mouth 3 (three) times a day , Disp: , Rfl:     topiramate (TOPAMAX) 100 mg tablet, Take 100 mg by mouth 2 (two) times a day , Disp: , Rfl:     torsemide (DEMADEX) 100 mg tablet, Take 0 5 tablets by mouth 2 (two) times a day, Disp: 60 tablet, Rfl: 0    traZODone (DESYREL) 100 mg tablet, Take 100 mg by mouth daily at bedtime  , Disp: , Rfl:     zolpidem (AMBIEN) 10 mg tablet, Take 10 mg by mouth daily at bedtime as needed for sleep, Disp: , Rfl:     Review of Systems   Constitutional: Negative for activity change, appetite change and fatigue  HENT: Negative for sore throat, trouble swallowing and voice change  Eyes: Negative for visual disturbance  Respiratory: Negative for choking, chest tightness and shortness of breath  Cardiovascular: Negative for chest pain, palpitations and leg swelling  Gastrointestinal: Negative for abdominal pain, constipation and diarrhea  Endocrine: Negative for cold intolerance, heat intolerance, polydipsia, polyphagia and polyuria  Genitourinary: Negative for frequency  Musculoskeletal: Negative for arthralgias and myalgias  Skin: Negative for rash  Neurological: Negative for dizziness and syncope  Hematological: Negative for adenopathy  Psychiatric/Behavioral: Negative for sleep disturbance  All other systems reviewed and are negative  Physical Exam:  Body mass index is 47 36 kg/m²  /78   Pulse 70   Ht 6' 1" (1 854 m)   Wt (!) 163 kg (359 lb)   BMI 47 36 kg/m²    Wt Readings from Last 3 Encounters:   04/24/18 (!) 163 kg (359 lb)   01/31/18 (!) 170 kg (375 lb)   01/16/18 (!) 167 kg (367 lb 4 9 oz)       Physical Exam   Constitutional: He is oriented to person, place, and time  He appears well-developed and well-nourished  No distress  HENT:   Head: Normocephalic and atraumatic     Mouth/Throat: Oropharynx is clear and moist    Eyes: Conjunctivae and EOM are normal  Pupils are equal, round, and reactive to light  Neck: Normal range of motion  Neck supple  No thyromegaly present  Cardiovascular: Normal rate, regular rhythm and normal heart sounds  No murmur heard  Pulmonary/Chest: Effort normal and breath sounds normal  No respiratory distress  He has no wheezes  He has no rales  Abdominal: Soft  Bowel sounds are normal  He exhibits no distension  There is no tenderness  Musculoskeletal: Normal range of motion  He exhibits no edema  Lymphadenopathy:     He has no cervical adenopathy  Neurological: He is alert and oriented to person, place, and time  Skin: Skin is warm and dry  Psychiatric: He has a normal mood and affect  Vitals reviewed  Diabetic Foot Exam    Labs:      Today A1C 6 0 POC testing  4/2/18 , Cr 1 92, LFTs normal K 3 4  1/22/18  LDL 60 Chol 263 Chol 141 HDL 28  TSH 1 97 Free T4 1 09  9/12/17 Vit D 31      Lab Results   Component Value Date    HGBA1C 6 9 (H) 10/11/2017     No results found for: GLU  Lab Results   Component Value Date    HGBA1C 6 9 (H) 10/11/2017       Lab Results   Component Value Date    CREATININE 1 72 (H) 11/22/2017    CREATININE 1 99 (H) 11/15/2017    CREATININE 1 71 (H) 11/07/2017    BUN 28 (H) 11/22/2017     11/22/2017    K 3 2 (L) 11/22/2017     11/22/2017    CO2 31 11/22/2017     eGFR   Date Value Ref Range Status   11/22/2017 44 ml/min/1 73sq m Final       Lab Results   Component Value Date    CHOL 110 11/04/2017    HDL 27 (L) 11/04/2017    TRIG 177 (H) 11/04/2017       Lab Results   Component Value Date    ALT 30 10/12/2017    AST 16 10/12/2017    ALKPHOS 117 (H) 10/12/2017    BILITOT 0 46 10/12/2017       Lab Results   Component Value Date    BNH8FSIMVDPI 2 340 10/11/2017    NPG1PTDZAOBI 3 604 03/08/2017    OAT7GUCBRAGJ 1 903 08/12/2016     Lab Results   Component Value Date    FREET4 0 95 10/11/2017       Impression & Plan:    Problem List Items Addressed This Visit     Hypertension     Well controlled on current regimen  Type 2 diabetes mellitus with renal complication (HCC) - Primary     Improved/at goal with A1C of 6 0 today at visit  Some hypoglycemia related to taking full dose of novolog with protein shakes  Advised him to take half dose of Novolog when using protein shakes  If skipping meal, should skip Novolog  Reviewed symptoms/treatment of hypoglycemia  Due to being on intensive insulin therapy,  he at high risk of severe hypoglycemia  Severe hypoglycemia can result in falls, injury, coma, seizure, or death  Discussed importance of following consistent carbohydrate diet to minimize glucose fluctuations  Bariatric surgery will be postponed until fall  If he has significant weight loss or starts low carb/liquid diet before next visit he should let us know  Relevant Medications    insulin aspart (NOVOLOG FLEXPEN) 100 Units/mL SOPN    Other Relevant Orders    POCT hemoglobin A1c (Completed)    Hyperlipidemia     Continue statin  Orders Placed This Encounter   Procedures    POCT hemoglobin A1c       Patient Instructions   **if eating a protein shake instead of a usual meal, take HALF dose of novolog  **if skipping a meal, skip novolog  Be consistent with carb count at each meal to prevent fluctuating blood sugars  Send glucose log in two weeks  Hypoglycemia instructions   Yamini Arlene  4/24/2018  [id number]    Low Blood Sugar    Steps to treat low blood sugar  1  Test blood sugar if you have symptoms of low blood sugar:   Low Blood Sugar Symptoms:  o Sweaty  o Dizzy  o Rapid heartbeat  o Shaky    o Bad mood  o Hungry      2  Treat blood sugar less than 70 with 15 grams of fast-acting carbohydrate:   Examples of 15 grams Fast-Acting Carbohydrate:  o 4 oz juice  o 4 oz regular soda  o 3-4 glucose tablets (chew)  o 3-4 hard candies (chew)              3    Wait 15 minutes and test your blood sugar again           4   If blood sugar is less than 100, repeat steps 2-3       5  When your blood sugar is 100 or more, eat a snack if it will be longer than one hour until your next meal  The snack should be 15 grams of carbohydrate and a protein:   Examples of snacks:  o ½ sandwich  o 6 crackers with cheese  o Piece of fruit with cheese or peanut butter  o 6 crackers with peanut butter            Discussed with the patient and all questioned fully answered  He will call me if any problems arise  Follow-up appointment in 3 months       Counseled patient on diagnostic results, prognosis, risk and benefit of treatment options, instruction for management, importance of treatment compliance, Risk  factor reduction and impressions      Jen Niño PA-C will f/u with PCP

## 2020-03-23 ENCOUNTER — CONSULT (OUTPATIENT)
Dept: NEUROSURGERY | Facility: CLINIC | Age: 60
End: 2020-03-23
Payer: MEDICARE

## 2020-03-23 DIAGNOSIS — Z51.81 ADMISSION FOR LONG-TERM (CURRENT) USE OF ANTICOAGULANTS: ICD-10-CM

## 2020-03-23 DIAGNOSIS — Z79.01 ADMISSION FOR LONG-TERM (CURRENT) USE OF ANTICOAGULANTS: ICD-10-CM

## 2020-03-23 DIAGNOSIS — G89.4 CHRONIC PAIN SYNDROME: Primary | ICD-10-CM

## 2020-03-23 DIAGNOSIS — Z79.899 ENCOUNTER FOR LONG-TERM (CURRENT) USE OF MEDICATIONS: ICD-10-CM

## 2020-03-23 DIAGNOSIS — T85.610A MALFUNCTION OF INTRATHECAL INFUSION PUMP, INITIAL ENCOUNTER: ICD-10-CM

## 2020-03-23 PROCEDURE — G2012 BRIEF CHECK IN BY MD/QHP: HCPCS | Performed by: NEUROLOGICAL SURGERY

## 2020-03-23 RX ORDER — CHLORHEXIDINE GLUCONATE 0.12 MG/ML
15 RINSE ORAL ONCE
Status: CANCELLED | OUTPATIENT
Start: 2020-03-23 | End: 2020-03-23

## 2020-03-23 RX ORDER — CEFAZOLIN SODIUM 2 G/50ML
2000 SOLUTION INTRAVENOUS ONCE
Status: CANCELLED | OUTPATIENT
Start: 2020-07-09 | End: 2020-03-23

## 2020-03-23 NOTE — PROGRESS NOTES
Virtual Brief Visit    Reason for visit is pump pocket issues/pump malfunction      Encounter provider Derrick Lee MD    Provider located at [address] [place]  [address] [facility] [address] [phone]      Recent Visits  No visits were found meeting these conditions  Showing recent visits within past 7 days and meeting all other requirements     Future Appointments  No visits were found meeting these conditions  Showing future appointments within next 150 days and meeting all other requirements        Patient agrees to participate in a virtual check in via telephone or video visit instead of presenting to the office to address urgent/immediate medical needs  Patient is aware this is a billable service  After connecting through telephone, the patient was identified by name and date of birth  Erich Perez was informed that this was a telemedicine visit and that the visit is being conducted through telephone which may not be secure and therefore might not be HIPAA-compliant  My office door was closed  No one else was in the room  He acknowledged consent and understanding of privacy and security of the virtual check-in visit  I informed the patient that I have reviewed his record in Epic and presented the opportunity for him to ask any questions regarding the visit today  The patient initiated communication and agreed to participate  Subjective  Erich Perez is a 64 y o  male patient with a pain pump that is loose and malfunctioned  Patient reports no significant issues at this time  HE is due for a refill of him pump medication  He follows with Dr Toth        Past Medical History:   Diagnosis Date    Acute on chronic diastolic congestive heart failure (HCC)     Altered gait     Alzheimer disease [facility]     per patients,,early onset     Angina pectoris ([facility]     Anxiety     Arthritis     Brain aneurysm     coils placed    Cardiac disease     Chest pain 1/13/2016    Chronic pain     back/ right groin and rle- has morphine pump    Constipation     COPD (chronic obstructive pulmonary disease) (HCC)     Coronary artery disease     CPAP (continuous positive airway pressure) dependence     Decubital ulcer     sacral decub-occured 5/2019-sees wound care/debide in OR today 6/6/2019    Dependent on walker for ambulation     w/c for long distance    Depression     Diabetes mellitus (HCC)     insulin dependent    Dizziness     occ    Dysphagia     Enlarged prostate     Fall     GERD (gastroesophageal reflux disease)     [facility] failure ([facility]     Hiatal hernia     Hx of gastric bypass 11/19/2018    Hypercholesterolemia     Hypertension     MI (myocardial infarction) ([facility] )     2017- stents x2    Migraine     Morbid obesity ([facility]     gastric bypass sleeve 11/2018-wt loss 125 lb    Neuropathy     Oxygen dependent     Q HS  2LPM with CPAP and prn during day 2-3 LPM     Renal disorder     Shortness of breath     Skin abnormality     sacral wound - covered with pad    Sleep apnea     Stented coronary artery     Stroke ([facility] )     vision loss b/l  2005, residual R leg weakness    Urinary frequency     Use of cane as ambulatory aid     Wears dentures     Wears glasses     Wears glasses     Wheelchair dependent        Past Surgical History:   Procedure Laterality Date    BACK SURGERY      BRAIN SURGERY      CARDIAC CATHETERIZATION      with stents    CEREBRAL ANEURYSM REPAIR      with coils    COLONOSCOPY      ESOPHAGOGASTRODUODENOSCOPY N/A 7/1/2016    Procedure: ESOPHAGOGASTRODUODENOSCOPY (EGD); Surgeon: Jazzy Degroot MD;  [facility];   Service:     GASTRIC BYPASS  11/19/2018    HERNIA REPAIR      HIATAL HERNIA REPAIR      INFUSION PUMP IMPLANTATION Left     morphine    KNEE ARTHROSCOPY Right     KNEE ARTHROSCOPY Right     PERONEAL NERVE DECOMPRESSION Right     AK DEBRIDEMENT OPEN WOUND 20 SQ CM< N/A 6/6/2019    Procedure: EXCISIONAL DEBRIDEMENT OF SACRAL DECUBITUS ULCER;  Surgeon: Khoa Fields MD;  [facility];  Service: General    NE ESOPHAGOGASTRODUODENOSCOPY TRANSORAL DIAGNOSTIC N/A 2/27/2017    Procedure: ESOPHAGOGASTRODUODENOSCOPY (EGD); Surgeon: Janice Whiteside MD;  [facility]; Service: Gastroenterology    NE ESOPHAGOGASTRODUODENOSCOPY TRANSORAL DIAGNOSTIC N/A 8/23/2018    Procedure: ESOPHAGOGASTRODUODENOSCOPY (EGD) with biopsy;  Surgeon: Janice Whiteside MD;  [facility]; Service: Gastroenterology    NE INSERT SPINE INFUSN [address] [place] [address] N/A 1/19/2017    Procedure: REMOVAL / [facility];  Surgeon: Eamon Zavala MD;  [facility];  Service: Orthopedics    NE INSERT SPINE INFUSN [address] [place] [address] N/A 5/16/2016    Procedure: REPLACEMENT AND PROGRAM PUMP ;  Surgeon: Eamon Zavala MD;  [facility];  Service: Orthopedics       Current Outpatient Medications   Medication Sig Dispense Refill    albuterol (ACCUNEB) 1 25 MG/3ML nebulizer solution Take 1 ampule by nebulization every 6 (six) hours as needed for wheezing      ARIPiprazole (ABILIFY) 2 mg tablet Take 1 tablet (2 mg total) by mouth daily 7 tablet 0    aspirin (ECOTRIN LOW STRENGTH) 81 mg EC tablet Take 81 mg by mouth daily      atorvastatin (LIPITOR) 40 mg tablet Take 40 mg by mouth daily        baclofen 10 mg tablet Take 10 mg by mouth 3 (three) times a day      CALCIUM PO Take 1 tablet by mouth daily      Cholecalciferol (VITAMIN D3) 5000 units CAPS Take 1 capsule (5,000 Units total) by mouth daily 30 capsule 6    clopidogrel (PLAVIX) 75 mg tablet Take 75 mg by mouth daily      Cyanocobalamin (VITAMIN B-12 PO) Take 1 tablet by mouth daily      ergocalciferol (VITAMIN D2) 50,000 units Take 50,000 Units by mouth once a week  0    fludrocortisone (FLORINEF) 0 1 mg tablet Take 1 tablet (0 1 mg total) by mouth daily  0    folic acid (FOLVITE) 1 mg tablet Take 1 mg by mouth daily   3    gabapentin (NEURONTIN) 300 mg capsule TAKE 1 CAPSULE (300 MG TOTAL) BY MOUTH AS NEEDED (MIGRAINE) 30 capsule 0    gabapentin (NEURONTIN) 600 MG tablet Take 600 mg by mouth 3 (three) times a day      hydrOXYzine HCL (ATARAX) 25 mg tablet Take 75 mg by mouth 2 (two) times a day as needed for anxiety       Linaclotide 290 MCG CAPS Take 290 mcg by mouth as needed       Morphine Sulfate Microinfusion (MORPHINE SULF MICROINFUSION PF IJ) Inject 14 mg as directed daily Via infusion pump      nystatin (MYCOSTATIN) cream Apply 1 application topically 2 (two) times a day      omeprazole (PriLOSEC) 40 MG capsule Take 40 mg by mouth daily      Pediatric Multivit-Minerals-C (POLYVITAMIN/IRON) CHEW Chew 1 tablet daily      polyethylene glycol (GLYCOLAX) powder MIX 1 HEAPING TABLESPOON (17 G) WITH 8 OUNCES OF WATER  PREPARE AND DRINK SOLUTION ONCE DAILY  5    POTASSIUM PO Take 40 mEq by mouth 2 (two) times a day      prochlorperazine (COMPAZINE) 10 mg tablet Take 1 tablet (10 mg total) by mouth every 6 (six) hours as needed for nausea or vomiting (migraine) 20 tablet 0    sertraline (ZOLOFT) 100 mg tablet Take 1 5 tablets (150 mg total) by mouth daily  0    tamsulosin (FLOMAX) 0 4 mg Take 0 4 mg by mouth daily with dinner   traZODone (DESYREL) 100 mg tablet Take 2 tablets (200 mg total) by mouth daily at bedtime as needed for sleep 14 tablet 0    nitroglycerin (NITROSTAT) 0 4 mg SL tablet Place 0 4 mg under the tongue every 5 (five) minutes as needed for chest pain (pt has not  used recently)   ULTICARE SHORT PEN NEEDLES 31G X 8 MM MISC 4 INJECTIONS PER DAY DX  E11 8  1     No current facility-administered medications for this visit  No Known Allergies    Assessment    Telma Wiseman telephone assessment is pump malfunction, request revision   Disposition:    OR for left abdomen pump revision   He was assessed the risks and benefits of the procedure the biggest risk being infection  I spent 15 minutes with the patient during this virtual check-in visit

## 2020-04-12 NOTE — ASSESSMENT & PLAN NOTE
Dr. KAM Rich    [address]    4/12/2020    Patient ID:  Name:  Akin Chaudhry  MRN:  [id number]  1933  86 y.o.  male            CC/Reason for visit: COVID-19 pneumonia     Interval hx: Patient continues to improve.  Not as much cough today, less short of breath.  Off of oxygen since early this morning      ROS: No fever.  No sputum.  No abdominal pain, no palpitations  Vitals:  Vitals:    04/12/20 0451 04/12/20 0753 04/12/20 0932 04/12/20 1439   BP: 116/76 129/76  128/89   BP Location: Right arm Left arm  Right arm   Patient Position: Lying Lying  Lying   Pulse: 69 69  63   Resp: 17 18  18   Temp: 96.9 °F (36.1 °C) 97.8 °F (36.6 °C) 95 °F (35 °C) 98.3 °F (36.8 °C)   TempSrc: Oral Oral  Oral   SpO2: 92% 94% 95% 92%   Weight:       Height:               Body mass index is 23.79 kg/m².    Intake/Output Summary (Last 24 hours) at 4/12/2020 1448  Last data filed at 4/11/2020 2052  Gross per 24 hour   Intake 240 ml   Output 300 ml   Net -60 ml       Exam:  GEN:  No distress  Alert, oriented x 3.   LUNGS: Clear breath sounds bilat, no use of accessory muscles  CV:  Normal S1S2, without murmur, no edema  ABD:  Non tender, no enlarged liver or masses      Scheduled meds:    apixaban 5 mg Oral Q12H   atorvastatin 40 mg Oral Daily   fluticasone 1 spray Each Nare BID   metoprolol succinate XL 50 mg Oral Daily   tamsulosin 0.4 mg Oral Nightly     IV meds:                           Data Review:   I reviewed the patient's medications and new clinical results.    COVID19   Date Value Ref Range Status   04/06/2020 Detected (C) Not Detected Final         Lab Results   Component Value Date    CALCIUM 8.3 (L) 04/09/2020    MG 2.4 04/08/2020    MG 1.8 04/07/2020    MG 1.9 10/18/2016     Results from last 7 days   Lab Units 04/09/20  0430 04/08/20  0328 04/07/20  0542 04/06/20  0335   SODIUM mmol/L 137 140 140 139   POTASSIUM mmol/L 4.4 4.8 3.3* 3.9   CHLORIDE mmol/L 103 109* 107 101   CO2 mmol/L 23.1 22.3  Lab Results   Component Value Date    HGBA1C 4 9 08/10/2019       No results for input(s): POCGLU in the last 72 hours  Blood Sugar Average: Last 72 hrs:  ·  without any medications his blood sugar has been controlled he had a gastric sleeve previously  20.9* 22.7   BUN mg/dL 17 20 17 23   CREATININE mg/dL 0.86 0.87 0.68* 1.09   CALCIUM mg/dL 8.3* 8.0* 6.9* 8.9   BILIRUBIN mg/dL 1.2 1.3* 1.1 1.6*   ALK PHOS U/L 64 64 54 77   ALT (SGPT) U/L 20 20 16 27   AST (SGOT) U/L 20 22 16 26   GLUCOSE mg/dL 88 110* 82 128*   WBC 10*3/mm3 6.79 7.16 6.70 7.37   HEMOGLOBIN g/dL 14.7 15.3 14.8 16.4   PLATELETS 10*3/mm3 322 292 280 264   PROBNP pg/mL  --   --   --  850.9   PROCALCITONIN ng/mL  --   --  0.08* 0.11     Results from last 7 days   Lab Units 04/07/20  0805 04/06/20  0346 04/06/20  0336   BLOODCX   --  No growth at 5 days No growth at 5 days   RESPCX  Light growth (2+) Normal Respiratory Ariana  --   --          ASSESSMENT:   1. Pneumonia due to COVID-19   2. Acute hypoxemic respiratory failure  3. Atrial fibrillation on chronic anticoagulation  4. Mild lactic acidosis, resolved  5. Hypokalemia  6. Hypocalcemia  7. Hypoalbuminemia    PLAN:  Much improved.  He has been weaned off of oxygen.  Home when okay with infection control policies and hospital policies.        Lyle Rich MD  4/12/2020

## 2020-04-29 ENCOUNTER — APPOINTMENT (OUTPATIENT)
Dept: LAB | Facility: HOSPITAL | Age: 60
End: 2020-04-29
Attending: NEUROLOGICAL SURGERY
Payer: MEDICARE

## 2020-04-29 DIAGNOSIS — G89.4 CHRONIC PAIN SYNDROME: ICD-10-CM

## 2020-04-29 DIAGNOSIS — Z79.899 ENCOUNTER FOR LONG-TERM (CURRENT) USE OF MEDICATIONS: ICD-10-CM

## 2020-04-29 DIAGNOSIS — T85.610A MALFUNCTION OF INTRATHECAL INFUSION PUMP, INITIAL ENCOUNTER: ICD-10-CM

## 2020-04-29 DIAGNOSIS — Z51.81 ADMISSION FOR LONG-TERM (CURRENT) USE OF ANTICOAGULANTS: ICD-10-CM

## 2020-04-29 DIAGNOSIS — Z79.01 ADMISSION FOR LONG-TERM (CURRENT) USE OF ANTICOAGULANTS: ICD-10-CM

## 2020-04-29 LAB
ALBUMIN SERPL BCP-MCNC: 3.2 G/DL (ref 3.5–5)
ALP SERPL-CCNC: 80 U/L (ref 46–116)
ALT SERPL W P-5'-P-CCNC: 21 U/L (ref 12–78)
ANION GAP SERPL CALCULATED.3IONS-SCNC: 2 MMOL/L (ref 4–13)
APTT PPP: 31 SECONDS (ref 23–37)
AST SERPL W P-5'-P-CCNC: 14 U/L (ref 5–45)
BASOPHILS # BLD AUTO: 0.06 THOUSANDS/ΜL (ref 0–0.1)
BASOPHILS NFR BLD AUTO: 1 % (ref 0–1)
BILIRUB SERPL-MCNC: 0.54 MG/DL (ref 0.2–1)
BUN SERPL-MCNC: 9 MG/DL (ref 5–25)
CALCIUM SERPL-MCNC: 8.9 MG/DL (ref 8.3–10.1)
CHLORIDE SERPL-SCNC: 106 MMOL/L (ref 100–108)
CO2 SERPL-SCNC: 31 MMOL/L (ref 21–32)
CREAT SERPL-MCNC: 1.08 MG/DL (ref 0.6–1.3)
EOSINOPHIL # BLD AUTO: 0.3 THOUSAND/ΜL (ref 0–0.61)
EOSINOPHIL NFR BLD AUTO: 4 % (ref 0–6)
ERYTHROCYTE [DISTWIDTH] IN BLOOD BY AUTOMATED COUNT: 13.3 % (ref 11.6–15.1)
EST. AVERAGE GLUCOSE BLD GHB EST-MCNC: 100 MG/DL
GFR SERPL CREATININE-BSD FRML MDRD: 76 ML/MIN/1.73SQ M
GLUCOSE SERPL-MCNC: 129 MG/DL (ref 65–140)
HBA1C MFR BLD: 5.1 %
HCT VFR BLD AUTO: 47.1 % (ref 36.5–49.3)
HGB BLD-MCNC: 15 G/DL (ref 12–17)
IMM GRANULOCYTES # BLD AUTO: 0.04 THOUSAND/UL (ref 0–0.2)
IMM GRANULOCYTES NFR BLD AUTO: 1 % (ref 0–2)
INR PPP: 1.11 (ref 0.84–1.19)
LYMPHOCYTES # BLD AUTO: 1.45 THOUSANDS/ΜL (ref 0.6–4.47)
LYMPHOCYTES NFR BLD AUTO: 17 % (ref 14–44)
MCH RBC QN AUTO: 29.8 PG (ref 26.8–34.3)
MCHC RBC AUTO-ENTMCNC: 31.8 G/DL (ref 31.4–37.4)
MCV RBC AUTO: 94 FL (ref 82–98)
MONOCYTES # BLD AUTO: 0.49 THOUSAND/ΜL (ref 0.17–1.22)
MONOCYTES NFR BLD AUTO: 6 % (ref 4–12)
NEUTROPHILS # BLD AUTO: 6.25 THOUSANDS/ΜL (ref 1.85–7.62)
NEUTS SEG NFR BLD AUTO: 71 % (ref 43–75)
NRBC BLD AUTO-RTO: 0 /100 WBCS
PLATELET # BLD AUTO: 188 THOUSANDS/UL (ref 149–390)
PMV BLD AUTO: 10.4 FL (ref 8.9–12.7)
POTASSIUM SERPL-SCNC: 4.7 MMOL/L (ref 3.5–5.3)
PROT SERPL-MCNC: 6.6 G/DL (ref 6.4–8.2)
PROTHROMBIN TIME: 14.5 SECONDS (ref 11.6–14.5)
RBC # BLD AUTO: 5.03 MILLION/UL (ref 3.88–5.62)
SODIUM SERPL-SCNC: 139 MMOL/L (ref 136–145)
WBC # BLD AUTO: 8.59 THOUSAND/UL (ref 4.31–10.16)

## 2020-04-29 PROCEDURE — 85025 COMPLETE CBC W/AUTO DIFF WBC: CPT

## 2020-04-29 PROCEDURE — 83036 HEMOGLOBIN GLYCOSYLATED A1C: CPT

## 2020-04-29 PROCEDURE — 80053 COMPREHEN METABOLIC PANEL: CPT

## 2020-04-29 PROCEDURE — 85730 THROMBOPLASTIN TIME PARTIAL: CPT

## 2020-04-29 PROCEDURE — 36415 COLL VENOUS BLD VENIPUNCTURE: CPT

## 2020-04-29 PROCEDURE — 85610 PROTHROMBIN TIME: CPT

## 2020-05-06 ENCOUNTER — ANESTHESIA EVENT (OUTPATIENT)
Dept: PERIOP | Facility: HOSPITAL | Age: 60
End: 2020-05-06
Payer: MEDICARE

## 2020-05-06 DIAGNOSIS — G89.4 CHRONIC PAIN DISORDER: ICD-10-CM

## 2020-05-06 PROCEDURE — U0003 INFECTIOUS AGENT DETECTION BY NUCLEIC ACID (DNA OR RNA); SEVERE ACUTE RESPIRATORY SYNDROME CORONAVIRUS 2 (SARS-COV-2) (CORONAVIRUS DISEASE [COVID-19]), AMPLIFIED PROBE TECHNIQUE, MAKING USE OF HIGH THROUGHPUT TECHNOLOGIES AS DESCRIBED BY CMS-2020-01-R: HCPCS

## 2020-05-06 NOTE — PRE-PROCEDURE INSTRUCTIONS
Pre-Surgery Instructions:   Medication Instructions    albuterol (ACCUNEB) 1 25 MG/3ML nebulizer solution Instructed patient per Anesthesia Guidelines   ARIPiprazole (ABILIFY) 2 mg tablet Instructed patient per Anesthesia Guidelines   aspirin (ECOTRIN LOW STRENGTH) 81 mg EC tablet Patient was instructed by Physician and understands   atorvastatin (LIPITOR) 40 mg tablet Instructed patient per Anesthesia Guidelines   baclofen 10 mg tablet Instructed patient per Anesthesia Guidelines   CALCIUM PO Instructed patient per Anesthesia Guidelines   Cholecalciferol (VITAMIN D3) 5000 units CAPS Instructed patient per Anesthesia Guidelines   clopidogrel (PLAVIX) 75 mg tablet Patient was instructed by Physician and understands   Cyanocobalamin (VITAMIN B-12 PO) Instructed patient per Anesthesia Guidelines   ergocalciferol (VITAMIN D2) 50,000 units Instructed patient per Anesthesia Guidelines   fludrocortisone (FLORINEF) 0 1 mg tablet Instructed patient per Anesthesia Guidelines   folic acid (FOLVITE) 1 mg tablet Instructed patient per Anesthesia Guidelines   gabapentin (NEURONTIN) 300 mg capsule Instructed patient per Anesthesia Guidelines   gabapentin (NEURONTIN) 600 MG tablet Instructed patient per Anesthesia Guidelines   hydrOXYzine HCL (ATARAX) 25 mg tablet Instructed patient per Anesthesia Guidelines   Morphine Sulfate Microinfusion (MORPHINE SULF MICROINFUSION PF IJ) Instructed patient per Anesthesia Guidelines   nitroglycerin (NITROSTAT) 0 4 mg SL tablet Instructed patient per Anesthesia Guidelines   nystatin (MYCOSTATIN) cream Instructed patient per Anesthesia Guidelines   omeprazole (PriLOSEC) 40 MG capsule Instructed patient per Anesthesia Guidelines   POTASSIUM PO Instructed patient per Anesthesia Guidelines   prochlorperazine (COMPAZINE) 10 mg tablet Instructed patient per Anesthesia Guidelines      sertraline (ZOLOFT) 100 mg tablet Instructed patient per Anesthesia Guidelines   tamsulosin (FLOMAX) 0 4 mg Instructed patient per Anesthesia Guidelines   traZODone (DESYREL) 100 mg tablet Instructed patient per Anesthesia Guidelines  Pt denies fever, sob, cough and sore throat currently  Pt will be having preop covid testing at [facility]  Per pt he stopped his plavix and aspirin prior to surgery  Pt also instructed to stop nsaids and supplements prior to surgery  Pt instructed to take rx meds on day of surgery with 1-2 sips of water  Pt verbalized understanding of shower instructions

## 2020-05-06 NOTE — PRE-PROCEDURE INSTRUCTIONS
Pre-Surgery Instructions:   Medication Instructions    albuterol (ACCUNEB) 1 25 MG/3ML nebulizer solution Instructed patient per Anesthesia Guidelines   ARIPiprazole (ABILIFY) 2 mg tablet Instructed patient per Anesthesia Guidelines   aspirin (ECOTRIN LOW STRENGTH) 81 mg EC tablet Patient was instructed by Physician and understands   atorvastatin (LIPITOR) 40 mg tablet Instructed patient per Anesthesia Guidelines   baclofen 10 mg tablet Instructed patient per Anesthesia Guidelines   CALCIUM PO Instructed patient per Anesthesia Guidelines   Cholecalciferol (VITAMIN D3) 5000 units CAPS Instructed patient per Anesthesia Guidelines   clopidogrel (PLAVIX) 75 mg tablet Patient was instructed by Physician and understands   Cyanocobalamin (VITAMIN B-12 PO) Instructed patient per Anesthesia Guidelines   ergocalciferol (VITAMIN D2) 50,000 units Instructed patient per Anesthesia Guidelines   fludrocortisone (FLORINEF) 0 1 mg tablet Instructed patient per Anesthesia Guidelines   folic acid (FOLVITE) 1 mg tablet Instructed patient per Anesthesia Guidelines   gabapentin (NEURONTIN) 300 mg capsule Instructed patient per Anesthesia Guidelines   gabapentin (NEURONTIN) 600 MG tablet Instructed patient per Anesthesia Guidelines   hydrOXYzine HCL (ATARAX) 25 mg tablet Instructed patient per Anesthesia Guidelines   Morphine Sulfate Microinfusion (MORPHINE SULF MICROINFUSION PF IJ) Instructed patient per Anesthesia Guidelines   nitroglycerin (NITROSTAT) 0 4 mg SL tablet Instructed patient per Anesthesia Guidelines   nystatin (MYCOSTATIN) cream Instructed patient per Anesthesia Guidelines   omeprazole (PriLOSEC) 40 MG capsule Instructed patient per Anesthesia Guidelines   POTASSIUM PO Instructed patient per Anesthesia Guidelines   prochlorperazine (COMPAZINE) 10 mg tablet Instructed patient per Anesthesia Guidelines      sertraline (ZOLOFT) 100 mg tablet Instructed patient per Anesthesia Guidelines   tamsulosin (FLOMAX) 0 4 mg Instructed patient per Anesthesia Guidelines   traZODone (DESYREL) 100 mg tablet Instructed patient per Anesthesia Guidelines

## 2020-05-07 LAB — SARS-COV-2 RNA SPEC QL NAA+PROBE: NOT DETECTED

## 2020-05-08 ENCOUNTER — TELEMEDICINE (OUTPATIENT)
Dept: NEUROSURGERY | Facility: CLINIC | Age: 60
End: 2020-05-08
Payer: MEDICARE

## 2020-05-08 VITALS — HEIGHT: 72 IN | WEIGHT: 242 LBS | BODY MASS INDEX: 32.78 KG/M2

## 2020-05-08 DIAGNOSIS — Z11.59 SCREENING FOR VIRAL DISEASE: ICD-10-CM

## 2020-05-08 DIAGNOSIS — T85.610A MALFUNCTION OF INTRATHECAL INFUSION PUMP, INITIAL ENCOUNTER: Primary | ICD-10-CM

## 2020-05-08 DIAGNOSIS — M54.17 RADICULOPATHY, LUMBOSACRAL REGION: ICD-10-CM

## 2020-05-08 DIAGNOSIS — F11.20 UNCOMPLICATED OPIOID DEPENDENCE (HCC): ICD-10-CM

## 2020-05-08 DIAGNOSIS — Z51.81 ADMISSION FOR LONG-TERM (CURRENT) USE OF ANTICOAGULANTS: ICD-10-CM

## 2020-05-08 DIAGNOSIS — Z01.818 PREOPERATIVE EXAMINATION: ICD-10-CM

## 2020-05-08 DIAGNOSIS — G89.4 CHRONIC PAIN DISORDER: ICD-10-CM

## 2020-05-08 DIAGNOSIS — Z97.8 PRESENCE OF INTRATHECAL PUMP: ICD-10-CM

## 2020-05-08 DIAGNOSIS — G62.9 NEUROPATHY: ICD-10-CM

## 2020-05-08 DIAGNOSIS — Z79.01 ADMISSION FOR LONG-TERM (CURRENT) USE OF ANTICOAGULANTS: ICD-10-CM

## 2020-05-08 DIAGNOSIS — Z79.899 ENCOUNTER FOR LONG-TERM (CURRENT) USE OF HIGH-RISK MEDICATION: ICD-10-CM

## 2020-05-08 PROBLEM — T85.615A MALFUNCTION OF INTRATHECAL INFUSION PUMP (HCC): Status: ACTIVE | Noted: 2020-05-08

## 2020-05-08 PROBLEM — M54.16 LUMBAR RADICULOPATHY: Status: ACTIVE | Noted: 2020-05-08

## 2020-05-08 PROCEDURE — 99215 OFFICE O/P EST HI 40 MIN: CPT | Performed by: NURSE PRACTITIONER

## 2020-05-13 ENCOUNTER — ANESTHESIA (OUTPATIENT)
Dept: PERIOP | Facility: HOSPITAL | Age: 60
End: 2020-05-13
Payer: MEDICARE

## 2020-06-22 ENCOUNTER — TELEPHONE (OUTPATIENT)
Dept: NEUROSURGERY | Facility: CLINIC | Age: 60
End: 2020-06-22

## 2020-06-25 ENCOUNTER — OFFICE VISIT (OUTPATIENT)
Dept: NEUROSURGERY | Facility: CLINIC | Age: 60
End: 2020-06-25

## 2020-06-25 ENCOUNTER — APPOINTMENT (OUTPATIENT)
Dept: LAB | Facility: HOSPITAL | Age: 60
End: 2020-06-25
Payer: MEDICARE

## 2020-06-25 VITALS
WEIGHT: 245 LBS | HEIGHT: 72 IN | DIASTOLIC BLOOD PRESSURE: 94 MMHG | BODY MASS INDEX: 33.18 KG/M2 | RESPIRATION RATE: 16 BRPM | SYSTOLIC BLOOD PRESSURE: 152 MMHG | HEART RATE: 79 BPM | TEMPERATURE: 97.9 F

## 2020-06-25 DIAGNOSIS — F11.20 UNCOMPLICATED OPIOID DEPENDENCE (HCC): ICD-10-CM

## 2020-06-25 DIAGNOSIS — G89.4 CHRONIC PAIN DISORDER: ICD-10-CM

## 2020-06-25 DIAGNOSIS — G89.4 CHRONIC PAIN DISORDER: Primary | ICD-10-CM

## 2020-06-25 DIAGNOSIS — Z11.59 SCREENING FOR VIRAL DISEASE: ICD-10-CM

## 2020-06-25 DIAGNOSIS — Z01.818 PREOPERATIVE EXAMINATION: ICD-10-CM

## 2020-06-25 DIAGNOSIS — Z97.8 PRESENCE OF INTRATHECAL PUMP: ICD-10-CM

## 2020-06-25 DIAGNOSIS — F11.20 OPIOID DEPENDENCE, CONTINUOUS (HCC): ICD-10-CM

## 2020-06-25 DIAGNOSIS — Z79.01 ADMISSION FOR LONG-TERM (CURRENT) USE OF ANTICOAGULANTS: ICD-10-CM

## 2020-06-25 DIAGNOSIS — M54.17 RADICULOPATHY, LUMBOSACRAL REGION: ICD-10-CM

## 2020-06-25 DIAGNOSIS — Z51.81 ADMISSION FOR LONG-TERM (CURRENT) USE OF ANTICOAGULANTS: ICD-10-CM

## 2020-06-25 DIAGNOSIS — T85.610A MALFUNCTION OF INTRATHECAL INFUSION PUMP, INITIAL ENCOUNTER: ICD-10-CM

## 2020-06-25 DIAGNOSIS — G62.9 NEUROPATHY: ICD-10-CM

## 2020-06-25 DIAGNOSIS — Z79.899 ENCOUNTER FOR LONG-TERM (CURRENT) USE OF HIGH-RISK MEDICATION: ICD-10-CM

## 2020-06-25 LAB
ALBUMIN SERPL BCP-MCNC: 3.4 G/DL (ref 3.5–5)
ALP SERPL-CCNC: 77 U/L (ref 46–116)
ALT SERPL W P-5'-P-CCNC: 21 U/L (ref 12–78)
ANION GAP SERPL CALCULATED.3IONS-SCNC: 4 MMOL/L (ref 4–13)
APTT PPP: 30 SECONDS (ref 23–37)
AST SERPL W P-5'-P-CCNC: 12 U/L (ref 5–45)
BACTERIA UR QL AUTO: ABNORMAL /HPF
BASOPHILS # BLD AUTO: 0.04 THOUSANDS/ΜL (ref 0–0.1)
BASOPHILS NFR BLD AUTO: 0 % (ref 0–1)
BILIRUB SERPL-MCNC: 0.63 MG/DL (ref 0.2–1)
BILIRUB UR QL STRIP: NEGATIVE
BUN SERPL-MCNC: 7 MG/DL (ref 5–25)
CALCIUM SERPL-MCNC: 9.2 MG/DL (ref 8.3–10.1)
CHLORIDE SERPL-SCNC: 109 MMOL/L (ref 100–108)
CLARITY UR: CLEAR
CO2 SERPL-SCNC: 27 MMOL/L (ref 21–32)
COLOR UR: ABNORMAL
CREAT SERPL-MCNC: 0.98 MG/DL (ref 0.6–1.3)
EOSINOPHIL # BLD AUTO: 0.33 THOUSAND/ΜL (ref 0–0.61)
EOSINOPHIL NFR BLD AUTO: 4 % (ref 0–6)
ERYTHROCYTE [DISTWIDTH] IN BLOOD BY AUTOMATED COUNT: 13 % (ref 11.6–15.1)
EST. AVERAGE GLUCOSE BLD GHB EST-MCNC: 108 MG/DL
GFR SERPL CREATININE-BSD FRML MDRD: 85 ML/MIN/1.73SQ M
GLUCOSE P FAST SERPL-MCNC: 87 MG/DL (ref 65–99)
GLUCOSE UR STRIP-MCNC: NEGATIVE MG/DL
HBA1C MFR BLD: 5.4 %
HCT VFR BLD AUTO: 49.5 % (ref 36.5–49.3)
HGB BLD-MCNC: 15.9 G/DL (ref 12–17)
HGB UR QL STRIP.AUTO: NEGATIVE
HYALINE CASTS #/AREA URNS LPF: ABNORMAL /LPF
IMM GRANULOCYTES # BLD AUTO: 0.04 THOUSAND/UL (ref 0–0.2)
IMM GRANULOCYTES NFR BLD AUTO: 0 % (ref 0–2)
INR PPP: 1.1 (ref 0.84–1.19)
KETONES UR STRIP-MCNC: ABNORMAL MG/DL
LEUKOCYTE ESTERASE UR QL STRIP: NEGATIVE
LYMPHOCYTES # BLD AUTO: 1.74 THOUSANDS/ΜL (ref 0.6–4.47)
LYMPHOCYTES NFR BLD AUTO: 19 % (ref 14–44)
MCH RBC QN AUTO: 29.8 PG (ref 26.8–34.3)
MCHC RBC AUTO-ENTMCNC: 32.1 G/DL (ref 31.4–37.4)
MCV RBC AUTO: 93 FL (ref 82–98)
MONOCYTES # BLD AUTO: 0.53 THOUSAND/ΜL (ref 0.17–1.22)
MONOCYTES NFR BLD AUTO: 6 % (ref 4–12)
NEUTROPHILS # BLD AUTO: 6.38 THOUSANDS/ΜL (ref 1.85–7.62)
NEUTS SEG NFR BLD AUTO: 71 % (ref 43–75)
NITRITE UR QL STRIP: NEGATIVE
NON-SQ EPI CELLS URNS QL MICRO: ABNORMAL /HPF
NRBC BLD AUTO-RTO: 0 /100 WBCS
PH UR STRIP.AUTO: 6.5 [PH]
PLATELET # BLD AUTO: 183 THOUSANDS/UL (ref 149–390)
PMV BLD AUTO: 10.9 FL (ref 8.9–12.7)
POTASSIUM SERPL-SCNC: 3.7 MMOL/L (ref 3.5–5.3)
PROT SERPL-MCNC: 6.7 G/DL (ref 6.4–8.2)
PROT UR STRIP-MCNC: ABNORMAL MG/DL
PROTHROMBIN TIME: 14.2 SECONDS (ref 11.6–14.5)
RBC # BLD AUTO: 5.34 MILLION/UL (ref 3.88–5.62)
RBC #/AREA URNS AUTO: ABNORMAL /HPF
SODIUM SERPL-SCNC: 140 MMOL/L (ref 136–145)
SP GR UR STRIP.AUTO: 1.02 (ref 1–1.03)
UROBILINOGEN UR QL STRIP.AUTO: 1 E.U./DL
WBC # BLD AUTO: 9.06 THOUSAND/UL (ref 4.31–10.16)
WBC #/AREA URNS AUTO: ABNORMAL /HPF

## 2020-06-25 PROCEDURE — 85730 THROMBOPLASTIN TIME PARTIAL: CPT

## 2020-06-25 PROCEDURE — 85025 COMPLETE CBC W/AUTO DIFF WBC: CPT

## 2020-06-25 PROCEDURE — 85610 PROTHROMBIN TIME: CPT

## 2020-06-25 PROCEDURE — 36415 COLL VENOUS BLD VENIPUNCTURE: CPT

## 2020-06-25 PROCEDURE — 83036 HEMOGLOBIN GLYCOSYLATED A1C: CPT

## 2020-06-25 PROCEDURE — 81001 URINALYSIS AUTO W/SCOPE: CPT | Performed by: NURSE PRACTITIONER

## 2020-06-25 PROCEDURE — PREOP: Performed by: NURSE PRACTITIONER

## 2020-06-25 PROCEDURE — 80053 COMPREHEN METABOLIC PANEL: CPT

## 2020-06-25 NOTE — H&P (VIEW-ONLY)
Assessment/Plan:    Preoperative examination     Prior General  Anesthesia Reactions--denies    Exercise Capacity --- He  Admits to exertional symptoms  of dyspnea but no chest pain when climbing 2 flights of stairs or when walking 2 city blocks  He admits chronic pain in low back and knees somewhat limit functional capacity  for performing both activities climbing stairs and walking 2 city blocks  Is much mor mobile and in less distress since 145 lbs weight loss over 2 years     Nicotine dependence  ----denies smoking      Pulmonary- pulmonologist Dr Lawanna Schwab , Elyssa Terrazas use CPAP with Oxygen at HS   DX mild intermittent asthma - reports has not used rescue inhaler or UDN in at least a year       Personal history of venous thromboembolic disease----denies      Cardiac---Jan 9 2020 cardiac left heart catheretization  with PCI of LAD with a OMER, ASA and Plavix, will require Plavix for at least 6 months after stent and ASA for life (cardiac note 1/9/2020), [facility] at [phone]       GI--- HO esophageal stricture since gastric sleeve sx , requires intermittent esophageal dilatation  Intractable nausea and vomiting treating with routine compazine dosing  Followed by [facility]  He underwent gastric sleeve surgery 11/19/18       Bleeding Tendency ---denies  Easily bruising nose or gum bleeds   History of cancer or other health condition that requires routine MRI imagining---denies       Skin recent stage 2 sacral pressure ulcer, 2/28/2020 , consulted with plastics, area resolved   Recommendations refrain from applying pressure to area and optimize nutrition  If area reopens with consider surgery  --Per Patient remains closed         Surgery Clearance:  PCP/Medical Clearance-  Must schedule pre-op clearance appointment      Cardiac-   reports cardiac clearance appointment Zaira 3 2020--not cleared until EKG completion scheduled on Zaira 10 2020 EKG-completed   Note of 6/5/20 Last office visit was a pre-op exam/risk assessment  Office visit note from 6/3 faxed to Dr Marietta Castellanos office  Coronary arteriosclerosis in native artery, Assessment & Plan:  He is status post recent PCI to LAD  Continue DAPT for 6 months  He will be able to hold DAPT at that point for surgery and should reinstitute DAPT when able from a procedural standpoint  The patient's cardiovascular status is not prohibitive for her planned procedure of revision intrathecal pain pump pocket  Pulmonary--ARLEEN bring CPAP on sx day use with 4 L Oxygen, as per pulmonary note can stop BIPAP        PAT---will complete today after appointment 6/25/20       Imagining requirement ----none required for procedure      In preparation for surgery the following information is explained  · Preoperative written instructions provided  · Medication exclusion  list provided and reviewed - do not take 7 days prior surgery or 14 days postoperatively including  OTC, supplements,  ASA containing  products (Excedrin migraine) and NSAID  Plavix hold 7 days pre-op and 7 days postoperatively (start July 2  and 7 days  resume July 15) ASA hold 7 days preoperatively and 14 days postoperatively thru 7/22/20, restart 7/23/20  · Explained pre operative skin preparation hygiene care use of products Hibiclens (chlorhexidine) and use Alfred cloth wipes as per protocol  Explained measures performed on surgery day preop to decrease infection risk  · Read and review the education booklet provided by the OR  several weeks ago  · Informed an antibiotic will be prescribed the day prior surgery,  start night of surgery and continue as directed   · Postoperative activity restrictions were reviewed , follow the basic "BLTs" no bending, no lifting greater than 10 lbs  , no twisting, and no stretching thru 6 weeks post op  Can ambulate as tolerated immediately post op     · Avoid repetitive pushing, pulling , or rotating movement of upper extremities  · Will likely be given an abdominal binder on surgery day wear for 6 weeks postoperatively to aid pump scar pocked healing  · Will receive postoperative discharge paperwork with care instructions explaining incision care and general body hygiene instructions such as when to resume  showers can resume  · Postoperative pain management and postoperative opioid induced constipation and bowel hygiene measures discussed  Opiate pain med as prescribed by pain management  NO ADDITIONAL POSTOPERATIVE OPIATES WILL BE PRESCRIBED POST OP  · Postoperative follow-up appointments were reviewed for 2 week visit and 6 weeks  Chronic Pain Disorder:  · As detailed in HPI section  · Longstanding history since 2003 after undergoing surgery for brain aneurysm coiling via right femoral area sustaining peroneal nerve damage  Presence of intrathecal Pump   · LLQ IT pump malpositioned out of scar pocket, freely movable and upper pump border protruding against skin  Problem started after Bariatric surgery and weight loss og 145 lbs  · IT pain pump managed by DR Ward Shepard  · Last Pump fill 6/25/20- Dr Deniz Talbot office to fax tele report , will attach in this note  · Next scheduled refill  6/23/20       Opioid Dependence, Continuous  ·  Intrathecal pain pump  since 2009 admixture morphine and bupivacaine 20 cc each drug @ 11 36 mcg per day, PTM set by Dr Ward Shepard ---patient denies ability to self control  Subjective: having surgery soon will be happy pump pressing against skin  Patient ID: Alaina Godfrey is a 64 y o  male     HPI   He has a longstanding history of chronic pain syndrome dating back to 2003 after undergoing surgery for brain aneurysm coiling via right femoral area sustaining peroneal nerve damage  He was referred for pain management for neuropathy and informed pain would be life long     Reports constant low back pain with radiation into right buttock, right groin, distally into right lower extremity down to right foot  Pain characterized as constant aching, burning, associated with numbness and tingling in right leg, foot , and toes  He was referred to Dr Yesy Enamorado in 2003, immediately after surgery, he exhausted all conservative treatment measures for chronic pain symptom management  In 2005 underwent surgery for insertion of intrathecal pain pump by Dr Yesy Enamorado for morphine administration, he remained on oral opiate for dual management  He is no longer on oral opiates, managed only with intrathecal morphine  IT pump delivers a continuous Morphine infusion and programed 5 additional bolus doses intermittent throughout the day, he denies independent use of a PTM device  He reports 40 - 50 % efficacy for IT pump relieving chronic pian symptoms       As per review of Dr Jeremias Bo note of 2/10/2020  EMG  does show axomnal neuropathy distally  He was also a diabetic with diabetic neuropathy affecting bilateral lower extremities  Characterization as bilateral leg numbness, tingling, burning pain, worse in right lower extremity  No longer treating with diabetic medication since bariatric surgery and weight loss of 145 lbs       Reports he underwent bariatric surgery , gastric sleeve, in November 2018 He weighed  245 lbs , lost 140 LBS  Motivating factor for weight loss was  multiple comorbid conditions  Reports is no longer diabetic  was taking insulin 5 times per day , off all diabetic medications  Is not longer treating for HTN, off all medications       He was referred to Dr Emiliano Celeste and consulted with him 3/2/20, after loosing 140 LBS the IT pain pump became  malpositioned, started flipping and malfunctioning interfering with drug delivery  After assessment Dr Emiliano Celeste deemed him an appropriate candidate to proceed with surgery      He was initially scheduled for surgery 5/13/2020 REVISION INTRATHECAL PAIN PUMP POCKET, LEFT ABDOMEN (Left Abdomen) with DR Emiliano Celeste  IV Sedation with Anesthesia   but was rescheduled secondary Jan 9 2020  Status post treatment of CP w/ CAD , PCI of LAD  W/ a OMER treated with Plavix and ASA  As per cardiology note 1/9/2020  Will require Plavix for at least 6 months after stent  And ASA for life      Surgery rescheduled for July 9 2020  REVIEW OF SYSTEMS  Review of Systems   Constitutional: Negative  HENT: Negative  Eyes: Negative  Respiratory: Negative  Cardiovascular: Negative  Gastrointestinal: Negative  Endocrine: Negative  Genitourinary: Negative  Musculoskeletal: Positive for back pain (right low back, hip, buttock, groin, leg, and feet ) and gait problem (uses a walker)  Skin: Negative  Allergic/Immunologic: Negative  Neurological: Positive for weakness (bilateral legs constantly ) and numbness (neuropathy B/L feet)  Hematological: Negative  Psychiatric/Behavioral: Negative  All other systems reviewed and are negative  Meds/Allergies     Current Outpatient Medications   Medication Sig Dispense Refill    albuterol (ACCUNEB) 1 25 MG/3ML nebulizer solution Take 1 ampule by nebulization every 6 (six) hours as needed for wheezing      ARIPiprazole (ABILIFY) 2 mg tablet Take 1 tablet (2 mg total) by mouth daily 7 tablet 0    aspirin (ECOTRIN LOW STRENGTH) 81 mg EC tablet Take 81 mg by mouth daily      atorvastatin (LIPITOR) 40 mg tablet Take 40 mg by mouth daily        baclofen 10 mg tablet Take 10 mg by mouth 3 (three) times a day      CALCIUM PO Take 1 tablet by mouth daily      Cholecalciferol (VITAMIN D3) 5000 units CAPS Take 1 capsule (5,000 Units total) by mouth daily 30 capsule 6    clopidogrel (PLAVIX) 75 mg tablet Take 75 mg by mouth daily      Cyanocobalamin (VITAMIN B-12 PO) Take 1 tablet by mouth daily      fludrocortisone (FLORINEF) 0 1 mg tablet Take 1 tablet (0 1 mg total) by mouth daily  0    folic acid (FOLVITE) 1 mg tablet Take 1 mg by mouth daily   3    gabapentin (NEURONTIN) 300 mg capsule TAKE 1 CAPSULE (300 MG TOTAL) BY MOUTH AS NEEDED (MIGRAINE) 30 capsule 0    gabapentin (NEURONTIN) 600 MG tablet Take 600 mg by mouth 3 (three) times a day      hydrOXYzine HCL (ATARAX) 25 mg tablet Take 75 mg by mouth 2 (two) times a day as needed for anxiety       Morphine Sulfate Microinfusion (MORPHINE SULF MICROINFUSION PF IJ) Inject 14 mg as directed daily Via infusion pump      nystatin (MYCOSTATIN) cream Apply 1 application topically 2 (two) times a day      omeprazole (PriLOSEC) 40 MG capsule Take 40 mg by mouth daily      POTASSIUM PO Take 40 mEq by mouth 2 (two) times a day      prochlorperazine (COMPAZINE) 10 mg tablet Take 1 tablet (10 mg total) by mouth every 6 (six) hours as needed for nausea or vomiting (migraine) 20 tablet 0    sertraline (ZOLOFT) 100 mg tablet Take 1 5 tablets (150 mg total) by mouth daily (Patient taking differently: Take 200 mg by mouth daily )  0    tamsulosin (FLOMAX) 0 4 mg Take 0 4 mg by mouth daily with dinner   traZODone (DESYREL) 100 mg tablet Take 2 tablets (200 mg total) by mouth daily at bedtime as needed for sleep 14 tablet 0    ergocalciferol (VITAMIN D2) 50,000 units Take 50,000 Units by mouth once a week  0    nitroglycerin (NITROSTAT) 0 4 mg SL tablet Place 0 4 mg under the tongue every 5 (five) minutes as needed for chest pain (pt has not  used recently)   ULTICARE SHORT PEN NEEDLES 31G X 8 MM MISC 4 INJECTIONS PER DAY DX  E11 8  1     No current facility-administered medications for this visit          No Known Allergies    PAST HISTORY    Past Medical History:   Diagnosis Date    Acute on chronic diastolic congestive heart failure (HCC)     Altered gait     Alzheimer disease ([address] )     per patients,,early onset     Angina pectoris ([address] )     Anxiety     Arthritis     Brain aneurysm     coils placed    Cardiac disease     Chest pain 1/13/2016    Chronic pain     back/ right groin and rle- has morphine pump    Constipation     COPD (chronic obstructive pulmonary disease) (HCC)     Coronary artery disease     CPAP (continuous positive airway pressure) dependence     Decubital ulcer     sacral decub-occured 5/2019-sees wound care/debide in OR today 6/6/2019    Dependent on walker for ambulation     w/c for long distance    Depression     Diabetes mellitus (HCC)     insulin dependent    Dizziness     occ    Dysphagia     Enlarged prostate     Fall     GERD (gastroesophageal reflux disease)     [facility]     Hiatal hernia     Hx of gastric bypass 11/19/2018    Hypercholesterolemia     Hypertension     [facility]     2017- stents x2    Migraine     Morbid obesity ([facility] )     gastric bypass sleeve 11/2018-wt loss 125 lb    Neuropathy     Oxygen dependent     Q HS  2LPM with CPAP and prn during day 2-3 LPM     Pressure injury of skin     Renal disorder     Shortness of breath     Skin abnormality     sacral wound - covered with pad    Sleep apnea     Stented coronary artery     Stroke ([facility]     vision loss b/l  2005, residual R leg weakness    Urinary frequency     Use of cane as ambulatory aid     Wears dentures     Wears glasses     Wears glasses     Wheelchair dependent        Past Surgical History:   Procedure Laterality Date    BACK SURGERY      BRAIN SURGERY      CARDIAC CATHETERIZATION      with stents    CEREBRAL ANEURYSM REPAIR      with coils    COLONOSCOPY      ESOPHAGOGASTRODUODENOSCOPY N/A 7/1/2016    Procedure: ESOPHAGOGASTRODUODENOSCOPY (EGD); Surgeon: Gerald Blevins MD;  [facility];   Service:     GASTRIC BYPASS  11/19/2018    HERNIA REPAIR      HIATAL HERNIA REPAIR      INFUSION PUMP IMPLANTATION Left     morphine    KNEE ARTHROSCOPY Right     KNEE ARTHROSCOPY Right     PERONEAL NERVE DECOMPRESSION Right     MS DEBRIDEMENT OPEN WOUND 20 SQ CM< N/A 6/6/2019    Procedure: EXCISIONAL DEBRIDEMENT OF SACRAL DECUBITUS ULCER;  Surgeon: Bekah Patel MD; [facility];  Service: General    IA ESOPHAGOGASTRODUODENOSCOPY TRANSORAL DIAGNOSTIC N/A 2/27/2017    Procedure: ESOPHAGOGASTRODUODENOSCOPY (EGD); Surgeon: Ady Bravo MD;  [facility]; Service: Gastroenterology    IA ESOPHAGOGASTRODUODENOSCOPY TRANSORAL DIAGNOSTIC N/A 8/23/2018    Procedure: ESOPHAGOGASTRODUODENOSCOPY (EGD) with biopsy;  Surgeon: Ady Bravo MD;  [facility]; Service: Gastroenterology    IA INSERT SPINE INFUSN [facility] N/A 1/19/2017    Procedure: REMOVAL / Paige Lyell;  Surgeon: Kamila Franks MD;  [facility];  Service: Orthopedics    IA INSERT SPINE INFUSN [facility] N/A 5/16/2016    Procedure: REPLACEMENT AND PROGRAM PUMP ;  Surgeon: Kamila Franks MD;  [facility];  Service: Orthopedics       Social History     Tobacco Use    Smoking status: Never Smoker    Smokeless tobacco: Never Used   Substance Use Topics    Alcohol use: Not Currently    Drug use: Yes     Types: Marijuana     Comment: medical marijuana       Family History   Problem Relation Age of Onset    Diabetes unspecified Mother     Diabetes unspecified Brother     Diabetes unspecified Paternal Uncle     Diabetes unspecified Maternal Grandmother     Diabetes unspecified Paternal Grandmother     Diabetes unspecified Brother        The following portions of the patient's history were reviewed and updated as appropriate: allergies, current medications, past family history, past medical history, past social history, past surgical history and problem list       EXAM    Vitals:Blood pressure 152/94, pulse 79, temperature 97 9 °F (36 6 °C), temperature source Tympanic, resp  rate 16, height 6' (1 829 m), weight 111 kg (245 lb)  ,Body mass index is 33 23 kg/m²  Physical Exam   Constitutional: He is oriented to person, place, and time  He appears well-developed and well-nourished  No distress  HENT:   Head: Normocephalic and atraumatic     Eyes: Conjunctivae are normal  Right eye exhibits no discharge  Left eye exhibits no discharge  No scleral icterus  Neck: Normal range of motion  Neck supple  Cardiovascular: Normal rate, regular rhythm and normal heart sounds  Exam reveals no gallop and no friction rub  No murmur heard  Pulmonary/Chest: Effort normal and breath sounds normal  No stridor  No respiratory distress  He has no wheezes  He has no rales  He exhibits no tenderness  Abdominal: Soft  Bowel sounds are normal  He exhibits no distension and no mass  There is no tenderness  There is no rebound and no guarding  Musculoskeletal: He exhibits no edema, tenderness or deformity  Ambulates using rolling seated walker   Neurological: He is alert and oriented to person, place, and time  No cranial nerve deficit  Skin: Skin is warm and dry  He is not diaphoretic  Psychiatric: He has a normal mood and affect  His behavior is normal    Nursing note and vitals reviewed  Neurologic Exam     Mental Status   Oriented to person, place, and time  Imaging Studies  No results found

## 2020-07-02 NOTE — PRE-PROCEDURE INSTRUCTIONS
Pre-Surgery Instructions:   Medication Instructions    albuterol (ACCUNEB) 1 25 MG/3ML nebulizer solution Instructed patient per Anesthesia Guidelines   ARIPiprazole (ABILIFY) 2 mg tablet Instructed patient per Anesthesia Guidelines   aspirin (ECOTRIN LOW STRENGTH) 81 mg EC tablet Patient was instructed by Physician and understands   atorvastatin (LIPITOR) 40 mg tablet Instructed patient per Anesthesia Guidelines   baclofen 10 mg tablet Instructed patient per Anesthesia Guidelines   CALCIUM PO Instructed patient per Anesthesia Guidelines   Cholecalciferol (VITAMIN D3) 5000 units CAPS Instructed patient per Anesthesia Guidelines   clopidogrel (PLAVIX) 75 mg tablet Instructed patient per Anesthesia Guidelines   Cyanocobalamin (VITAMIN B-12 PO) Instructed patient per Anesthesia Guidelines   ergocalciferol (VITAMIN D2) 50,000 units Instructed patient per Anesthesia Guidelines   fludrocortisone (FLORINEF) 0 1 mg tablet Instructed patient per Anesthesia Guidelines   folic acid (FOLVITE) 1 mg tablet Instructed patient per Anesthesia Guidelines   gabapentin (NEURONTIN) 300 mg capsule Instructed patient per Anesthesia Guidelines   gabapentin (NEURONTIN) 600 MG tablet Instructed patient per Anesthesia Guidelines   hydrOXYzine HCL (ATARAX) 25 mg tablet Instructed patient per Anesthesia Guidelines   Morphine Sulfate Microinfusion (MORPHINE SULF MICROINFUSION PF IJ) Instructed patient per Anesthesia Guidelines   nitroglycerin (NITROSTAT) 0 4 mg SL tablet Instructed patient per Anesthesia Guidelines   nystatin (MYCOSTATIN) cream Instructed patient per Anesthesia Guidelines   omeprazole (PriLOSEC) 40 MG capsule Instructed patient per Anesthesia Guidelines   POTASSIUM PO Instructed patient per Anesthesia Guidelines   prochlorperazine (COMPAZINE) 10 mg tablet Instructed patient per Anesthesia Guidelines      sertraline (ZOLOFT) 100 mg tablet Instructed patient per Anesthesia Guidelines   tamsulosin (FLOMAX) 0 4 mg Instructed patient per Anesthesia Guidelines   traZODone (DESYREL) 100 mg tablet Instructed patient per Anesthesia Guidelines  adrenergic antagonist Med Class     Continue to take this medication on your normal schedule  If this is an oral medication and you take it in the morning, then you may take this medicine with a sip of water  Alpha-1 adrenergic blocker Med Class     Continue to take this medication on your normal schedule  If this is an oral medication and you take it in the morning, then you may take this medicine with a sip of water  Antidepressant Med Class     Continue to take this medication on your normal schedule  If this is an oral medication and you take it in the morning, then you may take this medicine with a sip of water  Antiepileptic Med Class     Continue to take this medication on your normal schedule  If this is an oral medication and you take it in the morning, then you may take this medicine with a sip of water  Antipsychotic Med Class     Continue to take this medication on your normal schedule  If this is an oral medication and you take it in the morning, then you may take this medicine with a sip of water  ASA Med Class: Aspirin     Should be discontinued at least one week prior to planned operation, unless specifically stated otherwise by surgical service  Your Surgeon may have patient stop taking aspirin up to a week before surgery if having intracranial, middle ear, posterior eye, spine surgery or prostate surgery  [Patients taking aspirin for coronary stents should be reviewed by an anesthesiologist in the optimization clinic  Please do not discontinue aspirin in patients with coronary stents unless given specific permission to do so by the cardiologist who prescribed medication ]   If your surgeon approves please continue to take this medication on your normal schedule    You may take this medication on the morning of your surgery with a sip of water  Inhalational Med Class     Continue to take these inhaler medications on your normal schedule up to and including the day of surgery  Nitroglycerin Med Class     Continue to take your nitroglyerin as directed by your prescribing Physician and notify your Physician and Anesthesiologist if you have needed to increase the frequency or dosage  Opioid Med Class     Continue to take this medication on your normal schedule  If this is an oral medication and you take it in the morning, then you may take this medicine with a sip of water  Platelet Aggregation Inhibitor Clopidogrel (Plavix) Med Class     Should be discontinued at least one week prior to planned operation, unless specifically stated otherwise by surgical service  [Patients taking Plavix for coronary stents should be reviewed by an anesthesiologist in the optimization clinic  Please do not discontinue Plavix in patients with coronary stents unless given specific permission to do so by the cardiologist who prescribed medication ] If your surgeon approves please continue to take this medication on your normal schedule  You may take this medication on the morning of your surgery with a sip of water  Statin Med Class     Continue to take this medication on your normal schedule  If this is an oral medication and you take it in the morning, then you may take this medicine with a sip of water  Steroids Med Class     Continue to take this medication on your normal schedule  If this is an oral medication and you take it in the morning, then you may take this medicine with a sip of water  Vitamin Med Class     You may continue to take any vitamin that your surgeon has prescribed to you up to the day before surgery  If your surgeon has not specifically prescribed this vitamin or instructed you to continue then stop taking 7 days prior to surgery  Pre procedure instructions reviewed verbalizes understanding  Pt to follow MD instructions to stop Plavix,  7 days prior (starting 7/2)

## 2020-07-03 DIAGNOSIS — F11.20 UNCOMPLICATED OPIOID DEPENDENCE (HCC): ICD-10-CM

## 2020-07-03 DIAGNOSIS — G62.9 NEUROPATHY: ICD-10-CM

## 2020-07-03 DIAGNOSIS — Z01.818 PREOPERATIVE EXAMINATION: ICD-10-CM

## 2020-07-03 DIAGNOSIS — T85.610A MALFUNCTION OF INTRATHECAL INFUSION PUMP, INITIAL ENCOUNTER: ICD-10-CM

## 2020-07-03 DIAGNOSIS — Z11.59 SCREENING FOR VIRAL DISEASE: ICD-10-CM

## 2020-07-03 DIAGNOSIS — M54.17 RADICULOPATHY, LUMBOSACRAL REGION: ICD-10-CM

## 2020-07-03 DIAGNOSIS — G89.4 CHRONIC PAIN DISORDER: ICD-10-CM

## 2020-07-03 DIAGNOSIS — Z97.8 PRESENCE OF INTRATHECAL PUMP: ICD-10-CM

## 2020-07-03 PROCEDURE — U0003 INFECTIOUS AGENT DETECTION BY NUCLEIC ACID (DNA OR RNA); SEVERE ACUTE RESPIRATORY SYNDROME CORONAVIRUS 2 (SARS-COV-2) (CORONAVIRUS DISEASE [COVID-19]), AMPLIFIED PROBE TECHNIQUE, MAKING USE OF HIGH THROUGHPUT TECHNOLOGIES AS DESCRIBED BY CMS-2020-01-R: HCPCS

## 2020-07-07 LAB — SARS-COV-2 RNA SPEC QL NAA+PROBE: NOT DETECTED

## 2020-07-07 NOTE — H&P
Instructions from office: In preparation for surgery the following information is explained  · Preoperative written instructions provided  · Medication exclusion  list provided and reviewed - do not take 7 days prior surgery or 14 days postoperatively including  OTC, supplements,  ASA containing  products (Excedrin migraine) and NSAID   Plavix hold 7 days pre-op and 7 days postoperatively (start July 2  and 7 days  resume July 15) ASA hold 7 days preoperatively and 14 days postoperatively thru 7/22/20, restart 7/23/20  · Explained pre operative skin preparation hygiene care use of products Hibiclens (chlorhexidine) and use Alfred cloth wipes as per protocol    Explained measures performed on surgery day preop to decrease infection risk  · Read and review the education booklet provided by the OR  several weeks ago  · Informed an antibiotic will be prescribed the day prior surgery,  start night of surgery and continue as directed   · Postoperative activity restrictions were reviewed , follow the basic "BLTs" no bending, no lifting greater than 10 lbs  , no twisting, and no stretching thru 6 weeks post op    Can ambulate as tolerated immediately post op   · Avoid repetitive pushing, pulling , or rotating movement of upper extremities  · Will likely be given an abdominal binder on surgery day wear for 6 weeks postoperatively to aid pump scar pocked healing  · Will receive postoperative discharge paperwork with care instructions explaining incision care and general body hygiene instructions such as when to resume  showers can resume  · Postoperative pain management and postoperative opioid induced constipation and bowel hygiene measures discussed  Opiate pain med as prescribed by pain management  NO ADDITIONAL POSTOPERATIVE OPIATES WILL BE PRESCRIBED POST OP     · Postoperative follow-up appointments were reviewed for 2 week visit and 6 weeks

## 2020-07-08 ENCOUNTER — TELEPHONE (OUTPATIENT)
Dept: NEUROSURGERY | Facility: CLINIC | Age: 60
End: 2020-07-08

## 2020-07-08 DIAGNOSIS — Z79.2 PROPHYLACTIC ANTIBIOTIC: Primary | ICD-10-CM

## 2020-07-08 DIAGNOSIS — Z98.890 POST-OPERATIVE STATE: ICD-10-CM

## 2020-07-08 RX ORDER — CEPHALEXIN 500 MG/1
500 CAPSULE ORAL EVERY 6 HOURS SCHEDULED
Qty: 28 CAPSULE | Refills: 0 | Status: SHIPPED | OUTPATIENT
Start: 2020-07-08 | End: 2020-07-09 | Stop reason: SDUPTHER

## 2020-07-08 NOTE — ANESTHESIA PREPROCEDURE EVALUATION
Review of Systems/Medical History  Patient summary reviewed  Chart reviewed  No history of anesthetic complications     Cardiovascular  EKG reviewed, Hyperlipidemia, Past MI > 6 months, CAD , Cardiac stents (x6 (most recent stent in 1/2020)  ) > 6 months and drug eluting CHF (chronic diastolic CHF) ,   Comment: RBBB    Echo (6/10/20):  SUMMARY  Normal left ventricular chamber size  Normal left ventricular systolic function  Poor endocardial definition but grossly normal regional wall motion  Normal left ventricular wall thickness  Estimated left ventricular      ejection fraction is 55%  Enlarged right ventricular size  Normal right ventricular systolic function  No significant valvular abnormalities  Normal diastolic function             ,  Pulmonary  Asthma , well controlled/ stable Last rescue: < 6 months ago Asthma type of rescue: PRN inhaler, Sleep apnea (CPAP with 4 L O2 qhs) CPAP,        GI/Hepatic    GERD well controlled, Esophageal disease (s/p dilatations) benign esophageal stricture,  Hiatal hernia, Bariatric surgery (gastric sleeve 2018),        Kidney disease CKD, Chronic kidney disease stage 3,        Endo/Other  Diabetes well controlled type 2 Diet controlled,      GYN       Hematology  Negative hematology ROS      Musculoskeletal    Arthritis     Neurology    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; upper visual field loss) , residual symptoms,   Comment: Early on-set Alzheimer's disease Psychology   Anxiety, Depression ,   Chronic opioid dependence Chronic pain,            Physical Exam    Airway    Mallampati score: II  TM Distance: >3 FB  Neck ROM: full     Dental   Comment: Edentulous upper and lower,     Cardiovascular  Rhythm: regular, Rate: normal, Cardiovascular exam normal    Pulmonary  Pulmonary exam normal Breath sounds clear to auscultation,     Other Findings        Anesthesia Plan  ASA Score- 3     Anesthesia Type- IV sedation with anesthesia with ASA Monitors     Additional Monitors:   Airway Plan: Plan Factors-    Induction- intravenous  Postoperative Plan-     Informed Consent- Anesthetic plan and risks discussed with patient  I personally reviewed this patient with the CRNA  Discussed and agreed on the Anesthesia Plan with the CRNA             NPO verified  NKDA  Preop glucose was 74 this AM   Last doses of Plavix and aspirin were on 7/2/20  Patient has early-onset Alzheimer's disease however was able to answer questions and able to explain the procedure being done today  Plan:  IV sedation/MAC; GA as backup    Risks and benefits discussed with patient including possibility of recall under sedation  Questions answered  Patient consented

## 2020-07-08 NOTE — TELEPHONE ENCOUNTER
Pre operative call day prior surgery scheduled in the AM w/ Dr Elyse Nuñez, LEFT ABDOMEN (Left Abdomen)  Discussion/Review    Allergies ---Reviewed   Hold medications --- Reviewed Plavix hold 7 days pre-op and 7 days postoperatively (start July 2  and 7 days  resume July 15) ASA hold 7 days preoperatively and 14 days postoperatively thru 7/22/20, restart 7/23/20  NPO after MN, night prior surgery ---Reviewed as instructed by ASU nurse  Medication (s) instructed by healthcare provider to take the morning of surgery w/ sip of water 4 OZ discussed: as instructed by ASU nurse    Post operative antibiotic electronic transmission to pharmacy: cephalexin x 7 days     PDMP site reviewed accessed and reviewed scheduled drug list printed and scanned into record--N/A  Pain management script:no opiate has pain pump only Dr Toth can prescribe opiates         Pre- operative shower protocol reviewed; Clarify instructions as per protocol, third chlorhexidine shower tonight before surgery, then use ROSETTA wipes as per packaging instructions, Use a clean towel and wash cloth starting tonight and continue nightly until seen 2 weeks post operative visit for incision check removal  Change bed linens tonight and continue at least 1-2 times weekly  Informed will receive a telephone call tonight from a hospital representative with time to report on surgery day: --0600   Informed will receive a f/u call within in 24 -48 hours post-op to assess recovery reinforce instructions, and to answer any questions  Reinforced   Follow-up appointments reviewed: documented in discharge paper work 2 week  7/23   6 week    Patient verbalized understanding information provided /discussed

## 2020-07-09 ENCOUNTER — TELEPHONE (OUTPATIENT)
Dept: NEUROSURGERY | Facility: CLINIC | Age: 60
End: 2020-07-09

## 2020-07-09 ENCOUNTER — HOSPITAL ENCOUNTER (OUTPATIENT)
Facility: HOSPITAL | Age: 60
Setting detail: OUTPATIENT SURGERY
Discharge: HOME | End: 2020-07-09
Attending: NEUROLOGICAL SURGERY | Admitting: NEUROLOGICAL SURGERY
Payer: MEDICARE

## 2020-07-09 ENCOUNTER — APPOINTMENT (OUTPATIENT)
Dept: RADIOLOGY | Facility: HOSPITAL | Age: 60
End: 2020-07-09
Payer: MEDICARE

## 2020-07-09 VITALS
RESPIRATION RATE: 18 BRPM | BODY MASS INDEX: 33.18 KG/M2 | HEIGHT: 72 IN | SYSTOLIC BLOOD PRESSURE: 158 MMHG | OXYGEN SATURATION: 96 % | DIASTOLIC BLOOD PRESSURE: 53 MMHG | HEART RATE: 63 BPM | WEIGHT: 245 LBS | TEMPERATURE: 96.6 F

## 2020-07-09 DIAGNOSIS — Z79.2 PROPHYLACTIC ANTIBIOTIC: Primary | ICD-10-CM

## 2020-07-09 DIAGNOSIS — Z11.59 SCREENING EXAMINATION FOR POLIOMYELITIS: ICD-10-CM

## 2020-07-09 DIAGNOSIS — G89.4 CHRONIC PAIN DISORDER: Primary | ICD-10-CM

## 2020-07-09 DIAGNOSIS — Z98.890 POST-OPERATIVE STATE: ICD-10-CM

## 2020-07-09 LAB
GLUCOSE SERPL-MCNC: 74 MG/DL (ref 65–140)
GLUCOSE SERPL-MCNC: 87 MG/DL (ref 65–140)

## 2020-07-09 PROCEDURE — 82948 REAGENT STRIP/BLOOD GLUCOSE: CPT

## 2020-07-09 PROCEDURE — 62362 IMPLANT SPINE INFUSION PUMP: CPT | Performed by: NEUROLOGICAL SURGERY

## 2020-07-09 RX ORDER — GLYCOPYRROLATE 0.2 MG/ML
INJECTION INTRAMUSCULAR; INTRAVENOUS AS NEEDED
Status: DISCONTINUED | OUTPATIENT
Start: 2020-07-09 | End: 2020-07-09 | Stop reason: SURG

## 2020-07-09 RX ORDER — VANCOMYCIN HYDROCHLORIDE 1 G/20ML
INJECTION, POWDER, LYOPHILIZED, FOR SOLUTION INTRAVENOUS AS NEEDED
Status: DISCONTINUED | OUTPATIENT
Start: 2020-07-09 | End: 2020-07-09 | Stop reason: HOSPADM

## 2020-07-09 RX ORDER — SODIUM CHLORIDE 9 MG/ML
INJECTION, SOLUTION INTRAVENOUS AS NEEDED
Status: DISCONTINUED | OUTPATIENT
Start: 2020-07-09 | End: 2020-07-09 | Stop reason: HOSPADM

## 2020-07-09 RX ORDER — FENTANYL CITRATE 50 UG/ML
INJECTION, SOLUTION INTRAMUSCULAR; INTRAVENOUS AS NEEDED
Status: DISCONTINUED | OUTPATIENT
Start: 2020-07-09 | End: 2020-07-09 | Stop reason: SURG

## 2020-07-09 RX ORDER — SODIUM CHLORIDE, SODIUM LACTATE, POTASSIUM CHLORIDE, CALCIUM CHLORIDE 600; 310; 30; 20 MG/100ML; MG/100ML; MG/100ML; MG/100ML
75 INJECTION, SOLUTION INTRAVENOUS CONTINUOUS
Status: DISCONTINUED | OUTPATIENT
Start: 2020-07-09 | End: 2020-07-09 | Stop reason: HOSPADM

## 2020-07-09 RX ORDER — LIDOCAINE HYDROCHLORIDE 10 MG/ML
0.5 INJECTION, SOLUTION EPIDURAL; INFILTRATION; INTRACAUDAL; PERINEURAL ONCE AS NEEDED
Status: COMPLETED | OUTPATIENT
Start: 2020-07-09 | End: 2020-07-09

## 2020-07-09 RX ORDER — CEPHALEXIN 500 MG/1
500 CAPSULE ORAL EVERY 6 HOURS SCHEDULED
Qty: 28 CAPSULE | Refills: 0 | Status: SHIPPED | OUTPATIENT
Start: 2020-07-09 | End: 2020-07-16

## 2020-07-09 RX ORDER — CEFAZOLIN SODIUM 2 G/50ML
2000 SOLUTION INTRAVENOUS ONCE
Status: COMPLETED | OUTPATIENT
Start: 2020-07-09 | End: 2020-07-09

## 2020-07-09 RX ORDER — FENTANYL CITRATE/PF 50 MCG/ML
25 SYRINGE (ML) INJECTION
Status: DISCONTINUED | OUTPATIENT
Start: 2020-07-09 | End: 2020-07-09 | Stop reason: HOSPADM

## 2020-07-09 RX ORDER — ONDANSETRON 2 MG/ML
4 INJECTION INTRAMUSCULAR; INTRAVENOUS ONCE AS NEEDED
Status: DISCONTINUED | OUTPATIENT
Start: 2020-07-09 | End: 2020-07-09 | Stop reason: HOSPADM

## 2020-07-09 RX ORDER — ONDANSETRON 2 MG/ML
4 INJECTION INTRAMUSCULAR; INTRAVENOUS EVERY 6 HOURS PRN
Status: DISCONTINUED | OUTPATIENT
Start: 2020-07-09 | End: 2020-07-09 | Stop reason: HOSPADM

## 2020-07-09 RX ORDER — CHLORHEXIDINE GLUCONATE 0.12 MG/ML
15 RINSE ORAL ONCE
Status: COMPLETED | OUTPATIENT
Start: 2020-07-09 | End: 2020-07-09

## 2020-07-09 RX ORDER — OXYCODONE HYDROCHLORIDE 5 MG/1
5 TABLET ORAL EVERY 4 HOURS PRN
Status: DISCONTINUED | OUTPATIENT
Start: 2020-07-09 | End: 2020-07-09 | Stop reason: HOSPADM

## 2020-07-09 RX ORDER — PROPOFOL 10 MG/ML
INJECTION, EMULSION INTRAVENOUS CONTINUOUS PRN
Status: DISCONTINUED | OUTPATIENT
Start: 2020-07-09 | End: 2020-07-09 | Stop reason: SURG

## 2020-07-09 RX ORDER — ONDANSETRON 2 MG/ML
INJECTION INTRAMUSCULAR; INTRAVENOUS AS NEEDED
Status: DISCONTINUED | OUTPATIENT
Start: 2020-07-09 | End: 2020-07-09 | Stop reason: SURG

## 2020-07-09 RX ORDER — LIDOCAINE HYDROCHLORIDE 10 MG/ML
INJECTION, SOLUTION EPIDURAL; INFILTRATION; INTRACAUDAL; PERINEURAL AS NEEDED
Status: DISCONTINUED | OUTPATIENT
Start: 2020-07-09 | End: 2020-07-09 | Stop reason: SURG

## 2020-07-09 RX ORDER — LIDOCAINE HYDROCHLORIDE AND EPINEPHRINE 10; 10 MG/ML; UG/ML
INJECTION, SOLUTION INFILTRATION; PERINEURAL AS NEEDED
Status: DISCONTINUED | OUTPATIENT
Start: 2020-07-09 | End: 2020-07-09 | Stop reason: HOSPADM

## 2020-07-09 RX ORDER — SODIUM CHLORIDE, SODIUM LACTATE, POTASSIUM CHLORIDE, CALCIUM CHLORIDE 600; 310; 30; 20 MG/100ML; MG/100ML; MG/100ML; MG/100ML
100 INJECTION, SOLUTION INTRAVENOUS CONTINUOUS
Status: DISCONTINUED | OUTPATIENT
Start: 2020-07-09 | End: 2020-07-09 | Stop reason: HOSPADM

## 2020-07-09 RX ADMIN — FENTANYL CITRATE 25 MCG: 50 INJECTION, SOLUTION INTRAMUSCULAR; INTRAVENOUS at 08:57

## 2020-07-09 RX ADMIN — SODIUM CHLORIDE, SODIUM LACTATE, POTASSIUM CHLORIDE, AND CALCIUM CHLORIDE 75 ML/HR: .6; .31; .03; .02 INJECTION, SOLUTION INTRAVENOUS at 10:20

## 2020-07-09 RX ADMIN — GLYCOPYRROLATE 0.2 MG: 0.2 INJECTION, SOLUTION INTRAMUSCULAR; INTRAVENOUS at 08:48

## 2020-07-09 RX ADMIN — SODIUM CHLORIDE, SODIUM LACTATE, POTASSIUM CHLORIDE, AND CALCIUM CHLORIDE: .6; .31; .03; .02 INJECTION, SOLUTION INTRAVENOUS at 08:45

## 2020-07-09 RX ADMIN — FENTANYL CITRATE 25 MCG: 50 INJECTION, SOLUTION INTRAMUSCULAR; INTRAVENOUS at 09:42

## 2020-07-09 RX ADMIN — CEFAZOLIN SODIUM 2000 MG: 2 SOLUTION INTRAVENOUS at 08:45

## 2020-07-09 RX ADMIN — ONDANSETRON 4 MG: 2 INJECTION INTRAMUSCULAR; INTRAVENOUS at 09:08

## 2020-07-09 RX ADMIN — SODIUM CHLORIDE, SODIUM LACTATE, POTASSIUM CHLORIDE, AND CALCIUM CHLORIDE 100 ML/HR: .6; .31; .03; .02 INJECTION, SOLUTION INTRAVENOUS at 08:04

## 2020-07-09 RX ADMIN — FENTANYL CITRATE 25 MCG: 50 INJECTION, SOLUTION INTRAMUSCULAR; INTRAVENOUS at 10:15

## 2020-07-09 RX ADMIN — LIDOCAINE HYDROCHLORIDE 0.2 ML: 10 INJECTION, SOLUTION EPIDURAL; INFILTRATION; INTRACAUDAL; PERINEURAL at 08:04

## 2020-07-09 RX ADMIN — LIDOCAINE HYDROCHLORIDE 50 MG: 10 INJECTION, SOLUTION EPIDURAL; INFILTRATION; INTRACAUDAL; PERINEURAL at 08:48

## 2020-07-09 RX ADMIN — CHLORHEXIDINE GLUCONATE 0.12% ORAL RINSE 15 ML: 1.2 LIQUID ORAL at 07:48

## 2020-07-09 RX ADMIN — FENTANYL CITRATE 25 MCG: 50 INJECTION, SOLUTION INTRAMUSCULAR; INTRAVENOUS at 10:00

## 2020-07-09 RX ADMIN — FENTANYL CITRATE 25 MCG: 50 INJECTION, SOLUTION INTRAMUSCULAR; INTRAVENOUS at 10:10

## 2020-07-09 RX ADMIN — FENTANYL CITRATE 25 MCG: 50 INJECTION, SOLUTION INTRAMUSCULAR; INTRAVENOUS at 09:15

## 2020-07-09 RX ADMIN — CEFAZOLIN SODIUM 1000 MG: 2 SOLUTION INTRAVENOUS at 09:27

## 2020-07-09 RX ADMIN — PROPOFOL 100 MCG/KG/MIN: 10 INJECTION, EMULSION INTRAVENOUS at 08:48

## 2020-07-09 NOTE — OP NOTE
OPERATIVE REPORT  PATIENT NAME: Italia Giles    :  1960  MRN: [id number]  Pt Location: [facility] 10    SURGERY DATE: 2020    Surgeon(s) and Role:     * Faheem Pettit MD - Primary    Preop Diagnosis:  Chronic pain syndrome [G89 4]  Malfunction of intrathecal infusion pump, initial encounter [T85 610A]    Post-Op Diagnosis Codes:     * Chronic pain syndrome [G89 4]     * Malfunction of intrathecal infusion pump, initial encounter [T85 610A]    Procedure(s) (LRB):  REVISION INTRATHECAL PAIN PUMP POCKET, LEFT ABDOMEN (Left)    Specimen(s):  * No specimens in log *    Estimated Blood Loss:   Minimal    Drains:  * No LDAs found *    Anesthesia Type:   IV Sedation with Anesthesia    Operative Indications:  Chronic pain syndrome [G89 4]  Malfunction of intrathecal infusion pump, initial encounter [T85 610A]      Operative Findings:  See dictated note    Complications:   None    Procedure and Technique:  The patient was taken to the operative theater and successfully induced under sedation  The patient was positioned supine  A new incision above the patients prior incision was marked on the left abdomen  Then the patient was prepped and draped in sterile fashion, a timeout was performed  We began by making an incision with a #10 Blade  We then used monopolar cautery to dissect down to the generator       I then removed the generator from the pocket and dissected the subcutaneous tissue around the pocket  I then anchored silk suture to the points in the new pocket that we dissected  I then placed the pump back into the pocket and irrigated the wound and left vanc powder within  Of note the patient for a moment had his hand within the field  I took additional measures to irrigate the wound and redose antibiotics  I then proceeded to close in layers with 2-0 Vicryl for the deeper tissues and staples  for the skin   A dressing was placed      The patient was then was allowed to awaken from sedation and taken to the recovery area in stable condition  All needle and sponge counts were correct at the end of the procedure      I was present for the entire procedure    Patient Disposition:  PACU     SIGNATURE: Yosef Dawson MD  DATE: July 9, 2020  TIME: 10:02 AM

## 2020-07-09 NOTE — DISCHARGE INSTRUCTIONS
· Plavix hold 7 Resume July 15  · ASA hold restart 7/23/20  · Continue with antibiotic as prescribed the day prior surgery,  start night of surgery and continue as directed   · Follow the basic "BLTs" no bending, no lifting greater than 10 lbs  , no twisting, and no stretching thru 6 weeks post op      · Use a clean towel and wash cloth with every shower and continue nightly until seen 2 weeks post operative visit for incision check removal  Change bed linens tonight and continue at least 1-2 times weekly  · Can ambulate as tolerated immediately post op   · Avoid repetitive pushing, pulling , or rotating movement of upper extremities  · You were given an abdominal binder on surgery day wear for 6 weeks postoperatively to aid pump scar pocked healing    · Postoperative pain management and postoperative opioid induced constipation and bowel hygiene measures discussed  Opiate pain med as prescribed by pain management  NO ADDITIONAL POSTOPERATIVE OPIATES WILL BE PRESCRIBED POST OP       Above information copied and pasted from note dictated by Sumaya Mcnally RN on 7/7/2020 and Nicolette Velasco on 7/8/2020  Please see for further details

## 2020-07-09 NOTE — TELEPHONE ENCOUNTER
E-mail received form Dr Chuck Hernandez on surgery day  surgical field pt moved his hand close to the field  So just want to make sure we extend his antibiotics for 2 weeks post op  Used surgical staples for him     Pippa Olvera    Telephoned patient , he is sleeping spoke with wife , explained will add 7 additional days of antibiotic for total treatment of 14 days , sending script now

## 2020-07-09 NOTE — INTERVAL H&P NOTE
H&P reviewed  After examining the patient I find no changes in the patients condition since the H&P had been written  Vitals:    07/09/20 0737   BP: 140/81   Pulse: 58   Resp: 18   Temp: 98 3 °F (36 8 °C)   SpO2: 97%    REVISION INTRATHECAL PAIN PUMP POCKET, LEFT ABDOMEN (Left Abdomen) with DR Chuck Hernandez  IV Sedation with Anesthesia

## 2020-07-10 ENCOUNTER — TELEMEDICINE (OUTPATIENT)
Dept: NEUROSURGERY | Facility: CLINIC | Age: 60
End: 2020-07-10

## 2020-07-10 DIAGNOSIS — Z98.890 STATUS POST SURGERY: Primary | ICD-10-CM

## 2020-07-10 PROCEDURE — 99024 POSTOP FOLLOW-UP VISIT: CPT

## 2020-07-10 NOTE — PROGRESS NOTES
Virtual Brief Visit    Assessment/Plan:    Problem List Items Addressed This Visit     None      Visit Diagnoses     Status post surgery    -  Primary                Reason for visit is   Chief Complaint   Patient presents with    Virtual Brief Visit        Encounter provider Kristen Christianson RN    Provider located at [address] [place] [phone]    Recent Visits  No visits were found meeting these conditions  Showing recent visits within past 7 days and meeting all other requirements     Today's Visits  Date Type Provider Dept   07/10/20 Telemedicine Kristen Christianson RN [facility]   Showing today's visits and meeting all other requirements     Future Appointments  No visits were found meeting these conditions  Showing future appointments within next 150 days and meeting all other requirements        After connecting through telephone, the patient was identified by name and date of birth  Prince Landers was informed that this is a telemedicine visit and that the visit is being conducted through telephone  My office door was closed  No one else was in the room  He acknowledged consent and understanding of privacy and security of the platform  The patient has agreed to participate and understands he can discontinue the visit at any time  Patient is aware this is a billable service  Subjective    Prince Landers is a 64 y o  male 1 day s/p: REVISION INTRATHECAL PAIN PUMP POCKET, LEFT ABDOMEN by Dr Brad Vides  Patient reports that he is doing very well overall and denies any incisional issues or fevers  Patient denies any bleeding, dizziness, fever, redness and swelling but states that he is still having quite a bit of discomfort  He has been unable to visualize incision or bandage because he is currently wearing an abdominal binder    I encouraged him to have someone take a look at his incision today since he reports that it feels "hot through the binder"  Verified date/time/location of his upcoming POV  and advised him to call the office with any further questions or concerns, or if any incisional issues or fevers would arise  As we are not managing his post-op pain medication he is encouraged to reach out to his pain management doctor to get his pain under control  Patient received and does understand the discharge instructions  Reviewed incision care with Patient and he has no further questions at this time  He was appreciative for the call    HPI     Past Medical History:   Diagnosis Date    Acute on chronic diastolic congestive heart failure (HCC)     Altered gait     Alzheimer disease ([facility] )     per patients,,early onset     Angina pectoris ([facility] [facility] )     Anxiety     Arthritis     Brain aneurysm     coils placed    Cardiac disease     Chest pain 1/13/2016    Chronic pain     back/ right groin and rle- has morphine pump    Constipation     [facility]     Coronary artery disease     CPAP (continuous positive airway pressure) dependence     Decubital ulcer     sacral decub-occured 5/2019-sees wound care/debide in OR today 6/6/2019    Dependent on walker for ambulation     w/c for long distance    Depression     Diabetes mellitus (HCC)     insulin dependent    Dizziness     occ    Dysphagia     Enlarged prostate     Fall     GERD (gastroesophageal reflux disease)     Heart failure ([facility]     Hiatal hernia     Hx of gastric bypass 11/19/2018    Hypercholesterolemia     Hypertension     MI (myocardial infarction) ([address] )     2017- stents x2    Migraine     Morbid obesity ([facility] [address] )     gastric bypass sleeve 11/2018-wt loss 125 lb    Neuropathy     Oxygen dependent     Q HS  2LPM with CPAP and prn during day 2-3 LPM     Pressure injury of skin     Renal disorder     Shortness of breath     Skin abnormality     sacral wound - covered with pad    Sleep apnea     Stented coronary artery     Stroke [facility])     vision loss b/l  2005, residual R leg weakness    Urinary frequency     Use of cane as ambulatory aid     Wears dentures     Wears glasses     Wears glasses     Wheelchair dependent        Past Surgical History:   Procedure Laterality Date    BACK SURGERY      BRAIN SURGERY      CARDIAC CATHETERIZATION      with stents    CEREBRAL ANEURYSM REPAIR      with coils    COLONOSCOPY      ESOPHAGOGASTRODUODENOSCOPY N/A 7/1/2016    Procedure: ESOPHAGOGASTRODUODENOSCOPY (EGD); Surgeon: Denise Reveles MD;  [facility]: [facility]; Service:     GASTRIC BYPASS  11/19/2018    HERNIA REPAIR      HIATAL HERNIA REPAIR      INFUSION PUMP IMPLANTATION Left     morphine    KNEE ARTHROSCOPY Right     KNEE ARTHROSCOPY Right     PERONEAL NERVE DECOMPRESSION Right     AZ DEBRIDEMENT OPEN WOUND 20 SQ CM< N/A 6/6/2019    Procedure: EXCISIONAL DEBRIDEMENT OF SACRAL DECUBITUS ULCER;  Surgeon: Kami Penny MD;  [facility];  Service: General    AZ ESOPHAGOGASTRODUODENOSCOPY TRANSORAL DIAGNOSTIC N/A 2/27/2017    Procedure: ESOPHAGOGASTRODUODENOSCOPY (EGD); Surgeon: Denise Reveles MD;  [facility]: [facility]; Service: Gastroenterology    AZ ESOPHAGOGASTRODUODENOSCOPY TRANSORAL DIAGNOSTIC N/A 8/23/2018    Procedure: ESOPHAGOGASTRODUODENOSCOPY (EGD) with biopsy;  Surgeon: Denise Reveles MD;  [facility];   Service: Gastroenterology    AZ INSERT SPINE INFUSN [address] [place] [address] N/A 1/19/2017    Procedure: REMOVAL / EXCHANGE INTRATHECAL PAIN PUMP;  Surgeon: Leonor Lenz MD;  [facility];  Service: Orthopedics    AZ INSERT SPINE INFUSN [address] [place] [address] N/A 5/16/2016    Procedure: REPLACEMENT AND PROGRAM PUMP ;  Surgeon: Leonor Lenz MD;  [facility];  Service: Orthopedics       Current Outpatient Medications   Medication Sig Dispense Refill    albuterol (ACCUNEB) 1 25 MG/3ML nebulizer solution Take 1 ampule by nebulization every 6 (six) hours as needed for wheezing      ARIPiprazole (ABILIFY) 2 mg tablet Take 1 tablet (2 mg total) by mouth daily 7 tablet 0    atorvastatin (LIPITOR) 40 mg tablet Take 40 mg by mouth daily   baclofen 10 mg tablet Take 10 mg by mouth 3 (three) times a day      CALCIUM PO Take 1 tablet by mouth daily      cephalexin (KEFLEX) 500 mg capsule Take 1 capsule (500 mg total) by mouth every 6 (six) hours for 7 days For total of 14 days treatment after surgery starting today 28 capsule 0    Cholecalciferol (VITAMIN D3) 5000 units CAPS Take 1 capsule (5,000 Units total) by mouth daily 30 capsule 6    Cyanocobalamin (VITAMIN B-12 PO) Take 1 tablet by mouth daily      ergocalciferol (VITAMIN D2) 50,000 units Take 50,000 Units by mouth once a week  0    fludrocortisone (FLORINEF) 0 1 mg tablet Take 1 tablet (0 1 mg total) by mouth daily  0    folic acid (FOLVITE) 1 mg tablet Take 1 mg by mouth daily   3    gabapentin (NEURONTIN) 300 mg capsule TAKE 1 CAPSULE (300 MG TOTAL) BY MOUTH AS NEEDED (MIGRAINE) 30 capsule 0    gabapentin (NEURONTIN) 600 MG tablet Take 600 mg by mouth 3 (three) times a day      hydrOXYzine HCL (ATARAX) 25 mg tablet Take 75 mg by mouth 2 (two) times a day as needed for anxiety       Morphine Sulfate Microinfusion (MORPHINE SULF MICROINFUSION PF IJ) Inject 14 mg as directed daily Via infusion pump      nitroglycerin (NITROSTAT) 0 4 mg SL tablet Place 0 4 mg under the tongue every 5 (five) minutes as needed for chest pain (pt has not  used recently)          nystatin (MYCOSTATIN) cream Apply 1 application topically 2 (two) times a day      omeprazole (PriLOSEC) 40 MG capsule Take 40 mg by mouth daily      POTASSIUM PO Take 40 mEq by mouth 2 (two) times a day      prochlorperazine (COMPAZINE) 10 mg tablet Take 1 tablet (10 mg total) by mouth every 6 (six) hours as needed for nausea or vomiting (migraine) 20 tablet 0    sertraline (ZOLOFT) 100 mg tablet Take 1 5 tablets (150 mg total) by mouth daily (Patient taking differently: Take 200 mg by mouth daily )  0    tamsulosin (FLOMAX) 0 4 mg Take 0 4 mg by mouth daily with dinner   traZODone (DESYREL) 100 mg tablet Take 2 tablets (200 mg total) by mouth daily at bedtime as needed for sleep 14 tablet 0    ULTICARE SHORT PEN NEEDLES 31G X 8 MM MISC 4 INJECTIONS PER DAY DX  E11 8  1     No current facility-administered medications for this visit  No Known Allergies    Review of Systems    There were no vitals filed for this visit  VIRTUAL VISIT DISCLAIMER    Vinicio Kingston acknowledges that he has consented to an online visit or consultation  He understands that the online visit is based solely on information provided by him, and that, in the absence of a face-to-face physical evaluation by the physician, the diagnosis he receives is both limited and provisional in terms of accuracy and completeness  This is not intended to replace a full medical face-to-face evaluation by the physician  Vinicio Kingston understands and accepts these terms

## 2020-07-22 ENCOUNTER — TELEPHONE (OUTPATIENT)
Dept: NEUROSURGERY | Facility: CLINIC | Age: 60
End: 2020-07-22

## 2020-07-23 ENCOUNTER — OFFICE VISIT (OUTPATIENT)
Dept: NEUROSURGERY | Facility: CLINIC | Age: 60
End: 2020-07-23

## 2020-07-23 VITALS
HEIGHT: 72 IN | RESPIRATION RATE: 16 BRPM | TEMPERATURE: 98.2 F | BODY MASS INDEX: 33.46 KG/M2 | WEIGHT: 247 LBS | HEART RATE: 74 BPM | SYSTOLIC BLOOD PRESSURE: 133 MMHG | DIASTOLIC BLOOD PRESSURE: 87 MMHG

## 2020-07-23 DIAGNOSIS — Z97.8 PRESENCE OF INTRATHECAL PUMP: ICD-10-CM

## 2020-07-23 DIAGNOSIS — F11.20 OPIOID DEPENDENCE, CONTINUOUS (HCC): ICD-10-CM

## 2020-07-23 DIAGNOSIS — G89.4 CHRONIC PAIN DISORDER: Primary | ICD-10-CM

## 2020-07-23 PROBLEM — Z48.89 AFTERCARE FOLLOWING SURGERY: Status: ACTIVE | Noted: 2020-07-23

## 2020-07-23 PROCEDURE — 99024 POSTOP FOLLOW-UP VISIT: CPT | Performed by: NURSE PRACTITIONER

## 2020-07-23 NOTE — PROGRESS NOTES
Assessment/Plan:    Presence of intrathecal pump  · As detailed in HPI  · Presents for aftercare following surgery 7/9  REVISION INTRATHECAL PAIN PUMP POCKET, LEFT ABDOMEN (Left Abdomen) with DR Kadie Cole  · Left lower quadrant  incision -reports has covered with dressings since surgery , changed daily by wife after wiping area with NSS  Last Saturday the dressing had greenish discharge when removed, same dressing on since   · He is wearing abdominal binder  · Removed dressing , windowed with tape, no drainage on dressing clean and dry  · Left lower quadrant incision with 24 staples in place, removed without incident , tolerated procedure well  Refer to photo attachment  · Covered incision with single layer of gauze anchored with strip of tape  · Admits completed 14 days of ABX  The following instructions are reviewed in detail and continue thru next 4 weeks (6 week post op)      · At 2 weeks postop can resume restricted medications such as ASA, products containing ASA, NSAID, fish oils, and OTC products or as previously directed  Plavix resume 7/15, ASA today 7/22  ·  Resume driving 2 week postoperatively, drove to appointment  · Continue wearing abdominal binder  · Continue to observe incisions for redness, drainage, swelling dehiscence, increased pain, fever >/= 101, warmth to touch incision or skin surrounding, if occur call or report to office immediately for reassessment  · Cover incision with single layer gauze anchor with 1 strip do not window, protect form abdominal binder irritation , rubbing  · Continue showers using clean towel and wash cloth with OTC antibacterial body wash, pat dry after showering continue protocol for an additional 2 weeks  · Avoid immersion in water no swimming, hot tub use , or tub bath thru  6 week post -op  · Allow water to briefly run over incisions, do not rub incisions    · Postoperative activity restrictions, follow the basic "BLTs" no bending, no lifting greater than 10 lbs  , no twisting, and no stretching thru 6 weeks post op  · Avoid  repetitive movement of upper extremity on operative side pushing, pulling, rotation, lifting greater than 10 lbs  · Continue ambulate as tolerated  · If  there is any significant change in his condition , call and/or return to the office immediately for reassessment        Chronic pain disorder  · As detailed in HPI  · 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  · Chronic intrathecal pain pump  delivers a continuous Morphine infusion and programed 5 additional bolus doses intermittent throughout the day, he denies independent use of a PTM device  · Dr Jesse Giles is pain management practitioner  · Dr Omid Singh office assessed pump few day after surgery, to verify settings maintained  · Has appointment with Dr Jesse Giles tomorrow for pump refill       Opioid dependence, continuous (HCC)  · Intra Thecal pain  pump delivers a continuous Morphine infusion and programed 5 additional bolus doses intermittent throughout the day, he denies independent use of a PTM device  · Pump managed by DR Jesse Giles  Subjective:     Patient ID: Keyanna Ledezma is a 64 y o  male     HPI   He has a longstanding history of chronic pain syndrome dating back to 2003 after undergoing surgery for brain aneurysm coiling via right femoral area sustaining peroneal nerve damage  He was referred for pain management for neuropathy and informed pain would be life long  Reports constant low back pain with radiation into right buttock, right groin, distally into right lower extremity down to right foot  Pain characterized as constant aching, burning, associated with numbness and tingling in right leg, foot , and toes  He was referred to Dr Jesse Giles in 2003, immediately after surgery, he exhausted all conservative treatment measures for chronic pain symptom management    In 2005 underwent surgery for insertion of intrathecal pain pump by Dr Luke Johns for morphine administration, he remained on oral opiate for dual management  He is no longer on oral opiates, managed only with intrathecal morphine  IT pump delivers a continuous Morphine infusion and programed 5 additional bolus doses intermittent throughout the day, he denies independent use of a PTM device       He reports 40 - 50 % efficacy for IT pump relieving chronic pian symptoms       As per review of Dr Siena Robles note of 2/10/2020  EMG  does show axomnal neuropathy distally  He was also a diabetic with diabetic neuropathy affecting bilateral lower extremities  Characterization as bilateral leg numbness, tingling, burning pain, worse in right lower extremity   No longer treating with diabetic medication since bariatric surgery and weight loss of 145 lbs       Reports he underwent bariatric surgery , gastric sleeve, in November 2018 He weighed  245 lbs , lost 140 LBS  Motivating factor for weight loss was  multiple comorbid conditions  Reports is no longer diabetic  was taking insulin 5 times per day , off all diabetic medications  Is not longer treating for HTN, off all medications       He was referred to Dr Ant Ferrer and consulted with him 3/2/20, after loosing 140 LBS the IT pain pump became  malpositioned, started flipping and malfunctioning interfering with drug delivery  After assessment Dr Ant Ferrer deemed him an appropriate candidate to proceed with surgery      He was initially scheduled for surgery 5/13/2020 REVISION INTRATHECAL PAIN PUMP POCKET, LEFT ABDOMEN (Left Abdomen) with DR Ant Ferrer  IV Sedation with Anesthesia   but was rescheduled secondary Jan 9 2020  Status post treatment of CP w/ CAD , PCI of LAD  W/ a OMER treated with Plavix and ASA  As per cardiology note 1/9/2020  Will require Plavix for at least 6 months after stent  And ASA for life       Surgery rescheduled for July 9 2020      Impressions and treatment recommendations were discussed in detail with the patient who verbalized understanding, all questions were answered and contact information was given in the event future questions arise  REVIEW OF SYSTEMS  Review of Systems   Constitutional: Negative  HENT: Negative  Eyes: Negative  Respiratory: Negative  Cardiovascular: Negative  Gastrointestinal: Negative  Endocrine: Negative  Genitourinary: Negative  Musculoskeletal: Positive for back pain (right low back, hip, buttock, groin, leg, and feet  Swapnil Goody ) and gait problem (uses a walker  )  Skin: Positive for wound (surgical incision, patient reprts soreness, warmth and green drainage from incision this past Saturday)  Allergic/Immunologic: Negative  Neurological: Positive for weakness (bilateral legs constantly) and numbness (neuropathy B/L feet  )  Hematological: Negative  Psychiatric/Behavioral: Negative  All other systems reviewed and are negative  Meds/Allergies     Current Outpatient Medications   Medication Sig Dispense Refill    albuterol (ACCUNEB) 1 25 MG/3ML nebulizer solution Take 1 ampule by nebulization every 6 (six) hours as needed for wheezing      ARIPiprazole (ABILIFY) 2 mg tablet Take 1 tablet (2 mg total) by mouth daily 7 tablet 0    atorvastatin (LIPITOR) 40 mg tablet Take 40 mg by mouth daily        baclofen 10 mg tablet Take 10 mg by mouth 3 (three) times a day      CALCIUM PO Take 1 tablet by mouth daily      Cholecalciferol (VITAMIN D3) 5000 units CAPS Take 1 capsule (5,000 Units total) by mouth daily 30 capsule 6    Cyanocobalamin (VITAMIN B-12 PO) Take 1 tablet by mouth daily      fludrocortisone (FLORINEF) 0 1 mg tablet Take 1 tablet (0 1 mg total) by mouth daily  0    folic acid (FOLVITE) 1 mg tablet Take 1 mg by mouth daily   3    gabapentin (NEURONTIN) 300 mg capsule TAKE 1 CAPSULE (300 MG TOTAL) BY MOUTH AS NEEDED (MIGRAINE) 30 capsule 0    gabapentin (NEURONTIN) 600 MG tablet Take 600 mg by mouth 3 (three) times a day      hydrOXYzine HCL (ATARAX) 25 mg tablet Take 75 mg by mouth 2 (two) times a day as needed for anxiety       Morphine Sulfate Microinfusion (MORPHINE SULF MICROINFUSION PF IJ) Inject 14 mg as directed daily Via infusion pump      nystatin (MYCOSTATIN) cream Apply 1 application topically 2 (two) times a day      omeprazole (PriLOSEC) 40 MG capsule Take 40 mg by mouth daily      POTASSIUM PO Take 40 mEq by mouth 2 (two) times a day      prochlorperazine (COMPAZINE) 10 mg tablet Take 1 tablet (10 mg total) by mouth every 6 (six) hours as needed for nausea or vomiting (migraine) 20 tablet 0    sertraline (ZOLOFT) 100 mg tablet Take 1 5 tablets (150 mg total) by mouth daily (Patient taking differently: Take 200 mg by mouth daily )  0    tamsulosin (FLOMAX) 0 4 mg Take 0 4 mg by mouth daily with dinner   traZODone (DESYREL) 100 mg tablet Take 2 tablets (200 mg total) by mouth daily at bedtime as needed for sleep 14 tablet 0    ergocalciferol (VITAMIN D2) 50,000 units Take 50,000 Units by mouth once a week  0    nitroglycerin (NITROSTAT) 0 4 mg SL tablet Place 0 4 mg under the tongue every 5 (five) minutes as needed for chest pain (pt has not  used recently)   ULTICARE SHORT PEN NEEDLES 31G X 8 MM MISC 4 INJECTIONS PER DAY DX  E11 8  1     No current facility-administered medications for this visit          No Known Allergies    PAST HISTORY    Past Medical History:   Diagnosis Date    Acute on chronic diastolic congestive heart failure (HCC)     Altered gait     Alzheimer disease ([facility] [address] )     per patients,,early onset     Angina pectoris ([address] )     Anxiety     Arthritis     Brain aneurysm     coils placed    Cardiac disease     Chest pain 1/13/2016    Chronic pain     back/ right groin and rle- has morphine pump    Constipation     COPD (chronic obstructive pulmonary disease) (HCC)     Coronary artery disease     CPAP (continuous positive airway pressure) dependence     Decubital ulcer     sacral decub-occured 5/2019-sees wound care/debide in OR today 6/6/2019    Dependent on walker for ambulation     w/c for long distance    Depression     Diabetes mellitus (HCC)     insulin dependent    Dizziness     occ    Dysphagia     Enlarged prostate     Fall     GERD (gastroesophageal reflux disease)     Heart failure ([facility] [address] [facility]     Hiatal hernia     Hx of gastric bypass 11/19/2018    Hypercholesterolemia     Hypertension     MI (myocardial infarction) ([facility] [address] )     2017- stents x2    Migraine     Morbid obesity ([facility] [address] )     gastric bypass sleeve 11/2018-wt loss 125 lb    Neuropathy     Oxygen dependent     Q HS  2LPM with CPAP and prn during day 2-3 LPM     Pressure injury of skin     Renal disorder     Shortness of breath     Skin abnormality     sacral wound - covered with pad    Sleep apnea     Stented coronary artery     St[facility]     vision loss b/l  2005, residual R leg weakness    Urinary frequency     Use of cane as ambulatory aid     Wears dentures     Wears glasses     Wears glasses     Wheelchair dependent        Past Surgical History:   Procedure Laterality Date    BACK SURGERY      BRAIN SURGERY      CARDIAC CATHETERIZATION      with stents    CEREBRAL ANEURYSM REPAIR      with coils    COLONOSCOPY      ESOPHAGOGASTRODUODENOSCOPY N/A 7/1/2016    Procedure: ESOPHAGOGASTRODUODENOSCOPY (EGD); Surgeon: Es Albert MD;  [facility]: [facility];   Service:     GASTRIC BYPASS  11/19/2018    HERNIA REPAIR      HIATAL HERNIA REPAIR      INFUSION PUMP IMPLANTATION Left     morphine    INTRATHECAL PUMP IMPLANTATION Left 7/9/2020    Procedure: REVISION INTRATHECAL PAIN PUMP POCKET, LEFT ABDOMEN;  Surgeon: Nydia Sagastume MD;  [facility];  Service: Neurosurgery    KNEE ARTHROSCOPY Right     KNEE ARTHROSCOPY Right     PERONEAL NERVE DECOMPRESSION Right     NM DEBRIDEMENT OPEN WOUND 20 SQ CM< N/A 6/6/2019    Procedure: EXCISIONAL DEBRIDEMENT OF SACRAL DECUBITUS ULCER;  Surgeon: Rafy Bingham MD;  [facility];  Service: General    DC ESOPHAGOGASTRODUODENOSCOPY TRANSORAL DIAGNOSTIC N/A 2017    Procedure: ESOPHAGOGASTRODUODENOSCOPY (EGD); Surgeon: Oj Duckworth MD;  Location:  GI LAB; Service: Gastroenterology    DC ESOPHAGOGASTRODUODENOSCOPY TRANSORAL DIAGNOSTIC N/A 2018    Procedure: ESOPHAGOGASTRODUODENOSCOPY (EGD) with biopsy;  Surgeon: Oj Duckworth MD;  [facility]: [facility]; Service: Gastroenterology    DC INSERT SPINE INFUSN [facility] N/A 2017    Procedure: REMOVAL / Ariadne Bering;  Surgeon: Chris Emmanuel MD;  [facility];  Service: Orthopedics    DC INSERT SPINE INFUSN [facility] N/A 2016    Procedure: REPLACEMENT AND PROGRAM PUMP ;  Surgeon: Chris Emmanuel MD;  [facility];  Service: Orthopedics       Social History     Tobacco Use    Smoking status: Never Smoker    Smokeless tobacco: Never Used   Substance Use Topics    Alcohol use: Not Currently    Drug use: Not Currently     Types: Marijuana     Comment: medical marijuana licence        Family History   Problem Relation Age of Onset    Diabetes unspecified Mother     Diabetes unspecified Brother     Diabetes unspecified Paternal Uncle     Diabetes unspecified Maternal Grandmother     Diabetes unspecified Paternal Grandmother     Diabetes unspecified Brother     Heart attack Father        The following portions of the patient's history were reviewed and updated as appropriate: allergies, current medications, past family history, past medical history, past social history, past surgical history and problem list       EXAM    Vitals:Blood pressure 133/87, pulse 74, temperature 98 2 °F (36 8 °C), temperature source Tympanic, resp  rate 16, height 6' (1 829 m), weight 112 kg (247 lb)  ,Body mass index is 33 5 kg/m²  Physical Exam   Constitutional: He is oriented to person, place, and time   He appears well-nourished  No distress  HENT:   Head: Normocephalic and atraumatic  Cardiovascular: Normal rate and regular rhythm  Pulmonary/Chest: No respiratory distress  Musculoskeletal: He exhibits no edema  Neurological: He is alert and oriented to person, place, and time  Skin: Skin is warm and dry  He is not diaphoretic  Psychiatric: He has a normal mood and affect  His behavior is normal    Nursing note and vitals reviewed  Neurologic Exam     Mental Status   Oriented to person, place, and time       Gait, Coordination, and Reflexes     Gait  Gait: (mobility using seated rolling walker )      Imaging Studies

## 2020-07-23 NOTE — ASSESSMENT & PLAN NOTE
· As detailed in HPI  · Presents for aftercare following surgery 7/9  REVISION INTRATHECAL PAIN PUMP POCKET, LEFT ABDOMEN (Left Abdomen) with DR Ant Ferrer  · Left lower quadrant  incision -reports has covered with dressings since surgery , changed daily by wife after wiping area with NSS  Last Saturday the dressing had greenish discharge when removed, same dressing on since   · He is wearing abdominal binder  · Removed dressing , windowed with tape, no drainage on dressing clean and dry  · Left lower quadrant incision with 24 staples in place, removed without incident , tolerated procedure well  Refer to photo attachment  · Covered incision with single layer of gauze anchored with strip of tape  · Admits completed 14 days of ABX  The following instructions are reviewed in detail and continue thru next 4 weeks (6 week post op)      · At 2 weeks postop can resume restricted medications such as ASA, products containing ASA, NSAID, fish oils, and OTC products or as previously directed  Plavix resume 7/15, ASA today 7/22  ·  Resume driving 2 week postoperatively, drove to appointment  · Continue wearing abdominal binder  · Continue to observe incisions for redness, drainage, swelling dehiscence, increased pain, fever >/= 101, warmth to touch incision or skin surrounding, if occur call or report to office immediately for reassessment  · Cover incision with single layer gauze anchor with 1 strip do not window, protect form abdominal binder irritation , rubbing  · Continue showers using clean towel and wash cloth with OTC antibacterial body wash, pat dry after showering continue protocol for an additional 2 weeks  · Avoid immersion in water no swimming, hot tub use , or tub bath thru  6 week post -op  · Allow water to briefly run over incisions, do not rub incisions  · Postoperative activity restrictions, follow the basic "BLTs" no bending, no lifting greater than 10 lbs  , no twisting, and no stretching thru 6 weeks post op  · Avoid  repetitive movement of upper extremity on operative side pushing, pulling, rotation, lifting greater than 10 lbs  · Continue ambulate as tolerated    · If  there is any significant change in his condition , call and/or return to the office immediately for reassessment

## 2020-07-23 NOTE — ASSESSMENT & PLAN NOTE
· Intra Thecal pain  pump delivers a continuous Morphine infusion and programed 5 additional bolus doses intermittent throughout the day, he denies independent use of a PTM device  · Pump managed by DR Matthew Manzanares

## 2020-07-23 NOTE — ASSESSMENT & PLAN NOTE
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

## 2020-08-20 ENCOUNTER — TELEPHONE (OUTPATIENT)
Dept: NEUROSURGERY | Facility: CLINIC | Age: 60
End: 2020-08-20

## 2020-08-20 DIAGNOSIS — L08.9 INFLAMMATION, SKIN: Primary | ICD-10-CM

## 2020-08-20 DIAGNOSIS — Z98.890 STATUS POST INSERTION OF INTRATHECAL PUMP: ICD-10-CM

## 2020-08-20 RX ORDER — CEPHALEXIN 500 MG/1
500 CAPSULE ORAL EVERY 6 HOURS SCHEDULED
Qty: 40 CAPSULE | Refills: 0 | Status: SHIPPED | OUTPATIENT
Start: 2020-08-20 | End: 2020-08-30

## 2020-08-20 NOTE — TELEPHONE ENCOUNTER
Received call form [facility], nurse, Tremaine Mcgowan, patient had office visit today for pump fill, rash type appearance of skin overlying pump and question when we will proceed with insertion of spinal cord stimulator  Telephoned patient he describes skin overlying IT pump is red and hot to touch when compared to the rest of abdominal area  Has tenderness to touch in area  He denies fever, chills, incision closed   Reports pain management remarked the incision looks great  Plan :  Start ABX today  Office appointment tomorrow @ 1100  If condition deteriorated fever 101 or higher, chills , incision opening --go immediately to emergency room  Will discuss tomorrow future plan form [facility]CS, investigate issue further tomorrow  Dr Ty Baker aware, in agreement w/ plan

## 2020-08-21 ENCOUNTER — OFFICE VISIT (OUTPATIENT)
Dept: NEUROSURGERY | Facility: CLINIC | Age: 60
End: 2020-08-21

## 2020-08-21 ENCOUNTER — APPOINTMENT (OUTPATIENT)
Dept: LAB | Facility: HOSPITAL | Age: 60
End: 2020-08-21
Payer: MEDICARE

## 2020-08-21 VITALS
SYSTOLIC BLOOD PRESSURE: 128 MMHG | BODY MASS INDEX: 33.46 KG/M2 | HEART RATE: 69 BPM | WEIGHT: 247 LBS | HEIGHT: 72 IN | RESPIRATION RATE: 16 BRPM | DIASTOLIC BLOOD PRESSURE: 66 MMHG | TEMPERATURE: 98.1 F

## 2020-08-21 DIAGNOSIS — F11.20 OPIOID DEPENDENCE, CONTINUOUS (HCC): ICD-10-CM

## 2020-08-21 DIAGNOSIS — Z98.890 STATUS POST INSERTION OF INTRATHECAL PUMP: ICD-10-CM

## 2020-08-21 DIAGNOSIS — G89.4 CHRONIC PAIN DISORDER: ICD-10-CM

## 2020-08-21 DIAGNOSIS — Z97.8 PRESENCE OF INTRATHECAL PUMP: ICD-10-CM

## 2020-08-21 DIAGNOSIS — L08.9 INFLAMMATION, SKIN: ICD-10-CM

## 2020-08-21 DIAGNOSIS — L08.9 SKIN INFLAMMATION: Primary | ICD-10-CM

## 2020-08-21 LAB
BASOPHILS # BLD AUTO: 0.07 THOUSANDS/ΜL (ref 0–0.1)
BASOPHILS NFR BLD AUTO: 1 % (ref 0–1)
CRP SERPL QL: 5.4 MG/L
EOSINOPHIL # BLD AUTO: 0.36 THOUSAND/ΜL (ref 0–0.61)
EOSINOPHIL NFR BLD AUTO: 4 % (ref 0–6)
ERYTHROCYTE [DISTWIDTH] IN BLOOD BY AUTOMATED COUNT: 13.2 % (ref 11.6–15.1)
ERYTHROCYTE [SEDIMENTATION RATE] IN BLOOD: 7 MM/HOUR (ref 0–19)
HCT VFR BLD AUTO: 49.6 % (ref 36.5–49.3)
HGB BLD-MCNC: 16 G/DL (ref 12–17)
IMM GRANULOCYTES # BLD AUTO: 0.04 THOUSAND/UL (ref 0–0.2)
IMM GRANULOCYTES NFR BLD AUTO: 0 % (ref 0–2)
LYMPHOCYTES # BLD AUTO: 2.28 THOUSANDS/ΜL (ref 0.6–4.47)
LYMPHOCYTES NFR BLD AUTO: 25 % (ref 14–44)
MCH RBC QN AUTO: 30.4 PG (ref 26.8–34.3)
MCHC RBC AUTO-ENTMCNC: 32.3 G/DL (ref 31.4–37.4)
MCV RBC AUTO: 94 FL (ref 82–98)
MONOCYTES # BLD AUTO: 0.63 THOUSAND/ΜL (ref 0.17–1.22)
MONOCYTES NFR BLD AUTO: 7 % (ref 4–12)
NEUTROPHILS # BLD AUTO: 5.71 THOUSANDS/ΜL (ref 1.85–7.62)
NEUTS SEG NFR BLD AUTO: 63 % (ref 43–75)
NRBC BLD AUTO-RTO: 0 /100 WBCS
PLATELET # BLD AUTO: 210 THOUSANDS/UL (ref 149–390)
PMV BLD AUTO: 9.8 FL (ref 8.9–12.7)
RBC # BLD AUTO: 5.27 MILLION/UL (ref 3.88–5.62)
WBC # BLD AUTO: 9.09 THOUSAND/UL (ref 4.31–10.16)

## 2020-08-21 PROCEDURE — 86140 C-REACTIVE PROTEIN: CPT

## 2020-08-21 PROCEDURE — 99024 POSTOP FOLLOW-UP VISIT: CPT | Performed by: NURSE PRACTITIONER

## 2020-08-21 PROCEDURE — 85652 RBC SED RATE AUTOMATED: CPT

## 2020-08-21 PROCEDURE — 85025 COMPLETE CBC W/AUTO DIFF WBC: CPT

## 2020-08-21 PROCEDURE — 36415 COLL VENOUS BLD VENIPUNCTURE: CPT

## 2020-08-21 RX ORDER — POTASSIUM CHLORIDE 20 MEQ/1
TABLET, EXTENDED RELEASE ORAL
COMMUNITY
Start: 2020-07-28 | End: 2020-10-07

## 2020-08-21 RX ORDER — FENOPROFEN CALCIUM 200 MG
CAPSULE ORAL
COMMUNITY
Start: 2020-07-01

## 2020-08-21 RX ORDER — ARIPIPRAZOLE 5 MG/1
10 TABLET ORAL DAILY
COMMUNITY
Start: 2020-08-06

## 2020-08-21 RX ORDER — PEDIATRIC MULTIPLE VITAMIN W/ MINERALS & C CHEW TAB 60 MG 12 MG
1 CHEW TAB ORAL DAILY
COMMUNITY
Start: 2020-08-09

## 2020-08-21 RX ORDER — MEMANTINE HYDROCHLORIDE 10 MG/1
TABLET ORAL
COMMUNITY
Start: 2020-08-07

## 2020-08-21 RX ORDER — OMEPRAZOLE 20 MG/1
40 CAPSULE, DELAYED RELEASE ORAL DAILY
COMMUNITY
Start: 2020-05-21 | End: 2020-10-07

## 2020-08-21 NOTE — ASSESSMENT & PLAN NOTE
· As detailed in HPI  · Status post SCS trial   · Anticipating surgery for permanent insertion of a spinal cord stimulator after recovery form intrathecal pump pocket revision  · Chronic bilateral neuropathic pain secondary to diabetic neuropathy and chronic lumbar sacral nerve pain status post deep tissue injury from severe decubitus ulcers in area  Has extensive scaring in area

## 2020-08-21 NOTE — ASSESSMENT & PLAN NOTE
· As detailed in HPI   · Intrathecal Morphine Pain Pump form chronic pain management as detailed in HPI

## 2020-08-21 NOTE — ASSESSMENT & PLAN NOTE
As detailed in HPI  Streaking areas of inflammation of skin overlying intrathecal Morphine pump Left lower quadrant, warmth to touch in comparison to other abdominal areas  Tenderness to touch left lower quadrant area overlying pain pump  Incision intact no incision dehiscence or , drainage  Denied fever , chills  Refer to attachment photo  Notify office immediately in changes in condition , inflammation appearance of incision worsens dehiscence, drainage, or fever 101 or higher         Plan;  ABX prescribed   Labs CBC 9 9, diff WNL, ESR 46  CRP 5 4   RTO  in 1 week for reassess

## 2020-08-21 NOTE — PROGRESS NOTES
Assessment/Plan:    Skin inflammation  As detailed in HPI  Streaking areas of inflammation of skin overlying intrathecal Morphine pump Left lower quadrant, warmth to touch in comparison to other abdominal areas  Tenderness to touch left lower quadrant area overlying pain pump  Incision intact no incision dehiscence or , drainage  Denied fever , chills  Refer to attachment photo  Notify office immediately in changes in condition , inflammation appearance of incision worsens dehiscence, drainage, or fever 101 or higher  Plan;  ABX prescribed   Labs CBC 9 9, diff WNL, ESR 46  CRP 5 4   RTO  in 1 week for reassess      Opioid dependence, continuous (HCC)  · Intra Thecal pain  pump delivers a continuous Morphine infusion and programed 5 additional bolus doses intermittent throughout the day, he denies independent use of a PTM device  · Pump managed by DR Matthew Manzanares  Presence of intrathecal pump  · As detailed in HPI   · Intrathecal Morphine Pain Pump form chronic pain management as detailed in HPI     Chronic pain disorder  · As detailed in HPI  · Status post SCS trial   · Anticipating surgery for permanent insertion of a spinal cord stimulator after recovery form intrathecal pump pocket revision  · Chronic bilateral neuropathic pain secondary to diabetic neuropathy and chronic lumbar sacral nerve pain status post deep tissue injury from severe decubitus ulcers in area  Has extensive scaring in area  Subjective:     Patient ID: Edie Kaminski is a 64 y o  male     hospitals   Interim office visit as follow-up to conversation with [facility] yesterday, office nurse Darius Chapman  Patient in office for for pump fill, rash type appearance of skin overlying pump and question when we will proceed with insertion of spinal cord stimulator  Request we reach out to patient    Telephoned patient yesterday after receiving call he describes skin overlying IT pump is red and hot to touch when compared to the rest of abdominal area  Has tenderness to touch in area  He denies fever, chills, incision closed  Reports pain management remarked the incision looks great, all healed  Report 50-60 % efficacy for relief of chronic pain symptoms  He has a intrathecal Morphine pain pump for chronic pain secondary to nerve injury peroneal nerve damage occurred during brain aneurysm coiling via right femoral area  IT pump inserted 2009 by Dr Dieter Gaspar  Reports IT pump delivers 40-50 % efficacy for relieving chronic pain symptoms  Intrathecal pain pump flipped and migrated out of pocket  Underwent surgery with Dr Landa Block 7/9/20  REVISION Sharl Hali, LEFT ABDOMEN (Left Abdomen)  At 2 week postoperative visit incision healed , no evidence of infection redness or dehiscence  Leah Escamilla also questioned when patient will have spinal cord stimulator implant  Is status post thoracis SCS trial in June 2020 by Dr Yesy Diallo for chronic bilateral neuropathic pain secondary to diabetic neuropathy and chronic lumbar sacral nerve pain status post deep tissue injury from severe decubitus ulcers in area  Has extensive scaring in area  REVIEW OF SYSTEMS  Review of Systems   Constitutional: Negative  HENT: Negative  Eyes: Negative  Respiratory: Negative  Cardiovascular: Negative  Gastrointestinal: Negative  Endocrine: Negative  Genitourinary: Negative  Musculoskeletal: Positive for back pain (right low back, hip, buttock, groin, leg, and feet  Welling Organ ) and gait problem (uses a walker  )  Skin: Positive for wound (surgical incision, patient reprts soreness, warmth )  Allergic/Immunologic: Negative  Neurological: Positive for weakness (bilateral legs constantly) and numbness (neuropathy B/L feet  )  Hematological: Negative  Psychiatric/Behavioral: Negative  All other systems reviewed and are negative          Meds/Allergies     Current Outpatient Medications   Medication Sig Dispense Refill    albuterol (ACCUNEB) 1 25 MG/3ML nebulizer solution Take 1 ampule by nebulization every 6 (six) hours as needed for wheezing      ARIPiprazole (ABILIFY) 5 mg tablet Take 5 mg by mouth daily      atorvastatin (LIPITOR) 40 mg tablet Take 40 mg by mouth daily   baclofen 10 mg tablet Take 10 mg by mouth 3 (three) times a day      CALCIUM PO Take 1 tablet by mouth daily      cephalexin (KEFLEX) 500 mg capsule Take 1 capsule (500 mg total) by mouth every 6 (six) hours for 10 days Start today 40 capsule 0    Cholecalciferol (VITAMIN D3) 5000 units CAPS Take 1 capsule (5,000 Units total) by mouth daily 30 capsule 6    Cyanocobalamin (VITAMIN B-12 PO) Take 1 tablet by mouth daily      ergocalciferol (VITAMIN D2) 50,000 units Take 50,000 Units by mouth once a week  0    fludrocortisone (FLORINEF) 0 1 mg tablet Take 1 tablet (0 1 mg total) by mouth daily  0    folic acid (FOLVITE) 1 mg tablet Take 1 mg by mouth daily   3    gabapentin (NEURONTIN) 300 mg capsule TAKE 1 CAPSULE (300 MG TOTAL) BY MOUTH AS NEEDED (MIGRAINE) 30 capsule 0    gabapentin (NEURONTIN) 600 MG tablet Take 600 mg by mouth 3 (three) times a day      hydrOXYzine HCL (ATARAX) 25 mg tablet Take 75 mg by mouth 2 (two) times a day as needed for anxiety       Morphine Sulfate Microinfusion (MORPHINE SULF MICROINFUSION PF IJ) Inject 14 mg as directed daily Via infusion pump      nitroglycerin (NITROSTAT) 0 4 mg SL tablet Place 0 4 mg under the tongue every 5 (five) minutes as needed for chest pain (pt has not  used recently)          nystatin (MYCOSTATIN) cream Apply 1 application topically 2 (two) times a day      omeprazole (PriLOSEC) 20 mg delayed release capsule Take 40 mg by mouth daily      omeprazole (PriLOSEC) 40 MG capsule Take 40 mg by mouth daily      POTASSIUM PO Take 40 mEq by mouth 2 (two) times a day      prochlorperazine (COMPAZINE) 10 mg tablet Take 1 tablet (10 mg total) by mouth every 6 (six) hours as needed for nausea or vomiting (migraine) 20 tablet 0    sertraline (ZOLOFT) 100 mg tablet Take 1 5 tablets (150 mg total) by mouth daily (Patient taking differently: Take 200 mg by mouth daily )  0    tamsulosin (FLOMAX) 0 4 mg Take 0 4 mg by mouth daily with dinner   traZODone (DESYREL) 100 mg tablet Take 2 tablets (200 mg total) by mouth daily at bedtime as needed for sleep 14 tablet 0    ULTICARE SHORT PEN NEEDLES 31G X 8 MM MISC 4 INJECTIONS PER DAY DX  E11 8  1    fluticasone-salmeterol (Advair Diskus) 500-50 mcg/dose inhaler Advair Diskus 500 mcg-50 mcg/dose powder for inhalation      hydrocortisone 1 % lotion APPLY TOPICALLY 2 (TWO) TIMES A DAY INDICATIONS  USING ON FEET   memantine (NAMENDA) 10 mg tablet TAKE 1 TABLET (10 MG TOTAL) BY MOUTH 2 (TWO) TIMES A DAY   Pediatric Multivit-Minerals-C (Polyvitamin/Iron) CHEW Chew 1 tablet daily      potassium chloride (K-DUR,KLOR-CON) 20 mEq tablet TAKE 2 TABLETS (40 MEQ TOTAL) BY MOUTH 2 (TWO) TIMES A DAY  No current facility-administered medications for this visit          No Known Allergies    PAST HISTORY    Past Medical History:   Diagnosis Date    Acute on chronic diastolic congestive heart failure (HCC)     Altered gait     Alzheimer disease ([address] )     per patients,,early onset     Angina pectoris [facility]     Anxiety     Arthritis     Brain aneurysm     coils placed    Cardiac disease     Chest pain 1/13/2016    Chronic pain     back/ right groin and rle- has morphine pump    Constipation     COPD (chronic obstructive pulmonary disease) (HCC)     Coronary artery disease     CPAP (continuous positive airway pressure) dependence     Decubital ulcer     sacral decub-occured 5/2019-sees wound care/debide in OR today 6/6/2019    Dependent on walker for ambulation     w/c for long distance    Depression     Diabetes mellitus (HCC)     insulin dependent    Dizziness     occ    Dysphagia     Enlarged prostate     Fall     GERD (gastroesophageal reflux disease)     Heart failure ([facility] )     Hiatal hernia     Hx of gastric bypass 11/19/2018    Hypercholesterolemia     Hypertension     MI (myocardial infarction) ([facility] )     2017- stents x2    Migraine     Morbid obesity ([facility] )     gastric bypass sleeve 11/2018-wt loss 125 lb    Neuropathy     Oxygen dependent     Q HS  2LPM with CPAP and prn during day 2-3 LPM     Pressure injury of skin     Renal disorder     Shortness of breath     Skin abnormality     sacral wound - covered with pad    Sleep apnea     Stented coronary artery     Stroke ([facility] )     vision loss b/l  2005, residual R leg weakness    Urinary frequency     Use of cane as ambulatory aid     Wears dentures     Wears glasses     Wears glasses     Wheelchair dependent        Past Surgical History:   Procedure Laterality Date    BACK SURGERY      BRAIN SURGERY      CARDIAC CATHETERIZATION      with stents    CEREBRAL ANEURYSM REPAIR      with coils    COLONOSCOPY      ESOPHAGOGASTRODUODENOSCOPY N/A 7/1/2016    Procedure: ESOPHAGOGASTRODUODENOSCOPY (EGD); Surgeon: Benita Moran MD;  [facility]: [facility]; Service:     GASTRIC BYPASS  11/19/2018    HERNIA REPAIR      HIATAL HERNIA REPAIR      INFUSION PUMP IMPLANTATION Left     morphine    INTRATHECAL PUMP IMPLANTATION Left 7/9/2020    Procedure: REVISION INTRATHECAL PAIN PUMP POCKET, LEFT ABDOMEN;  Surgeon: Faheem Pettit MD;  [facility];  Service: Neurosurgery    KNEE ARTHROSCOPY Right     KNEE ARTHROSCOPY Right     PERONEAL NERVE DECOMPRESSION Right     GA DEBRIDEMENT OPEN WOUND 20 SQ CM< N/A 6/6/2019    Procedure: EXCISIONAL DEBRIDEMENT OF SACRAL DECUBITUS ULCER;  Surgeon: Flynn Kauffman MD;  [facility];  Service: General    GA ESOPHAGOGASTRODUODENOSCOPY TRANSORAL DIAGNOSTIC N/A 2/27/2017    Procedure: ESOPHAGOGASTRODUODENOSCOPY (EGD); Surgeon: Benita Moran MD;  [facility];   Service: Gastroenterology    GA ESOPHAGOGASTRODUODENOSCOPY TRANSORAL DIAGNOSTIC N/A 2018    Procedure: ESOPHAGOGASTRODUODENOSCOPY (EGD) with biopsy;  Surgeon: Shantell Elam MD;  [facility]: [facility]; Service: Gastroenterology    TX INSERT SPINE INFUSN [facility] N/A 2017    Procedure: REMOVAL / Etha Gathers;  Surgeon: Diaz Hooper MD;  [facility];  Service: Orthopedics    TX INSERT SPINE INFUSN [facility] N/A 2016    Procedure: REPLACEMENT AND PROGRAM PUMP ;  Surgeon: Diaz Hooper MD;  [facility];  Service: Orthopedics       Social History     Tobacco Use    Smoking status: Never Smoker    Smokeless tobacco: Never Used   Substance Use Topics    Alcohol use: Not Currently    Drug use: Not Currently     Types: Marijuana     Comment: medical marijuana licence        Family History   Problem Relation Age of Onset    Diabetes unspecified Mother     Diabetes unspecified Brother     Diabetes unspecified Paternal Uncle     Diabetes unspecified Maternal Grandmother     Diabetes unspecified Paternal Grandmother     Diabetes unspecified Brother     Heart attack Father        The following portions of the patient's history were reviewed and updated as appropriate: allergies, current medications, past family history, past medical history, past social history, past surgical history and problem list       EXAM    Vitals:Blood pressure 128/66, pulse 69, temperature 98 1 °F (36 7 °C), temperature source Tympanic, resp  rate 16, height 6' (1 829 m), weight 112 kg (247 lb)  ,Body mass index is 33 5 kg/m²  Physical Exam  Vitals signs and nursing note reviewed  Constitutional:       Appearance: Normal appearance  HENT:      Head: Normocephalic and atraumatic  Eyes:      General: No scleral icterus  Left eye: No discharge  Neck:      Musculoskeletal: Normal range of motion and neck supple  Cardiovascular:      Rate and Rhythm: Normal rate and regular rhythm  Pulmonary:      Effort: Pulmonary effort is normal  No respiratory distress  Abdominal:      Comments: Loss of abdominal muscle tome    Musculoskeletal:      Right lower leg: No edema  Left lower leg: No edema  Skin:     General: Skin is warm and dry  Coloration: Skin is not pale  Findings: No erythema  Neurological:      Mental Status: He is alert  Gait: Gait is intact  Neurologic Exam     Gait, Coordination, and Reflexes     Gait  Gait: normal (Use rolling walker)      Imaging StudiesI have personally reviewed pertinent reports     and I have personally reviewed pertinent films in PACS

## 2020-08-28 ENCOUNTER — OFFICE VISIT (OUTPATIENT)
Dept: NEUROSURGERY | Facility: CLINIC | Age: 60
End: 2020-08-28

## 2020-08-28 VITALS
SYSTOLIC BLOOD PRESSURE: 120 MMHG | BODY MASS INDEX: 33.18 KG/M2 | TEMPERATURE: 98 F | HEIGHT: 72 IN | WEIGHT: 245 LBS | DIASTOLIC BLOOD PRESSURE: 80 MMHG

## 2020-08-28 DIAGNOSIS — L08.9 SKIN INFLAMMATION: ICD-10-CM

## 2020-08-28 DIAGNOSIS — Z97.8 PRESENCE OF INTRATHECAL PUMP: ICD-10-CM

## 2020-08-28 DIAGNOSIS — L08.9 INFLAMMATION, SKIN: Primary | ICD-10-CM

## 2020-08-28 DIAGNOSIS — G89.4 CHRONIC PAIN DISORDER: ICD-10-CM

## 2020-08-28 DIAGNOSIS — F11.20 OPIOID DEPENDENCE, CONTINUOUS (HCC): ICD-10-CM

## 2020-08-28 PROCEDURE — 99024 POSTOP FOLLOW-UP VISIT: CPT | Performed by: NURSE PRACTITIONER

## 2020-08-28 RX ORDER — DOXYCYCLINE HYCLATE 100 MG/1
100 CAPSULE ORAL EVERY 12 HOURS SCHEDULED
Qty: 60 CAPSULE | Refills: 0 | Status: SHIPPED | OUTPATIENT
Start: 2020-08-28 | End: 2020-09-17 | Stop reason: ALTCHOICE

## 2020-08-28 NOTE — ASSESSMENT & PLAN NOTE
· Intra Thecal pain  pump delivers a continuous Morphine infusion and programed 5 additional bolus doses intermittent throughout the day, he denies independent use of a PTM device     · Pump managed by DR Justine Emerson

## 2020-08-28 NOTE — ASSESSMENT & PLAN NOTE
· As detailed in HPI  · Status post SCS trial   · Anticipating surgery for permanent insertion of a spinal cord stimulator after recovery from intrathecal pump pocket revision  · Chronic bilateral neuropathic pain secondary to diabetic neuropathy and chronic lumbar sacral nerve pain status post deep tissue injury from severe decubitus ulcers in area  Has extensive scaring in area

## 2020-08-28 NOTE — PROGRESS NOTES
Assessment/Plan:    Skin inflammation  As detailed in HPI  Streaking areas of inflammation of skin overlying intrathecal Morphine pump Left lower quadrant, warmth to touch in comparison to other abdominal areas  Received 5 bolus doses per day, has momentary burning sensation in area every time he is due for bolus dose  Tenderness to touch left lower quadrant area overlying pain pump  Incision intact no incision dehiscence or , drainage  Denied fever , chills  Refer to attachment photo  Notify office immediately in changes in condition , inflammation appearance of incision worsens dehiscence, drainage, or fever 101 or higher          Plan;  ABX prescribed cephalexin x 10 days continues , Dr Emiliano Celeste recommend adding doxycycline  Drug interaction with doxycycline -stop calcium and multiviti w/ iron supplements while taking medication  RTO  in 2 weeks for reassess    Opioid dependence, continuous (HCC)  · Intra Thecal pain  pump delivers a continuous Morphine infusion and programed 5 additional bolus doses intermittent throughout the day, he denies independent use of a PTM device  · Pump managed by DR Yesy Enamorado    Chronic pain disorder  · As detailed in HPI  · Status post SCS trial   · Anticipating surgery for permanent insertion of a spinal cord stimulator after recovery from intrathecal pump pocket revision  · Chronic bilateral neuropathic pain secondary to diabetic neuropathy and chronic lumbar sacral nerve pain status post deep tissue injury from severe decubitus ulcers in area  Has extensive scaring in area  Presence of intrathecal pump  · As detailed in HPI   · Intrathecal Morphine Pain Pump form chronic pain management as detailed in HPI      Subjective:     Patient ID: Vinicio Kingston is a 64 y o  male     HPI     Interim office visit as follow-up to conversation with [facility] yesterday, office nurse Alesha Givens   Patient in office for for pump fill, rash type appearance of skin overlying pump and question when we will proceed with insertion of spinal cord stimulator  Request we reach out to patient  Telephoned patient yesterday after receiving call he describes skin overlying IT pump is red and hot to touch when compared to the rest of abdominal area  Has tenderness to touch in area  He denies fever, chills, incision closed  Reports pain management remarked the incision looks great, all healed  Report 50-60 % efficacy for relief of chronic pain symptoms      He has a intrathecal Morphine pain pump for chronic pain secondary to nerve injury 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 coiling via right femoral area  IT pump inserted 2009 by Dr Tami Salcido  Reports IT pump delivers 40-50 % efficacy for relieving chronic pain symptoms      Intrathecal pain pump flipped and migrated out of pocket  Underwent surgery with Dr Harrison Clay 7/9/20  REVISION Lauren Soliman, LEFT ABDOMEN (Left Abdomen)     At 2 week postoperative visit incision healed , no evidence of infection redness or dehiscence        [facility] anticipating surgery for insertion spinal cord stimulator  Is status post thoracis SCS trial in June 2020 by Dr Tarik Torres for chronic bilateral neuropathic pain secondary to diabetic neuropathy and chronic lumbar sacral nerve pain status post deep tissue injury from severe decubitus ulcers in area, has extensive scaring in area  REVIEW OF SYSTEMS  Review of Systems   Constitutional: Negative  HENT: Negative  Eyes: Positive for visual disturbance (No peripheral vision )  Respiratory: Negative  Cardiovascular: Negative  Gastrointestinal: Negative  Endocrine: Negative  Genitourinary: Negative  Musculoskeletal: Positive for arthralgias, back pain (Low back pain into groin on the right side ) and gait problem (PAtient ambulates using a walker)  Right leg pain      Skin: Negative  Allergic/Immunologic: Negative      Neurological: Positive for numbness (Neuropathy )  Hematological: Bruises/bleeds easily (Patient on blood thinners )  Psychiatric/Behavioral: Negative  Meds/Allergies     Current Outpatient Medications   Medication Sig Dispense Refill    albuterol (ACCUNEB) 1 25 MG/3ML nebulizer solution Take 1 ampule by nebulization every 6 (six) hours as needed for wheezing      ARIPiprazole (ABILIFY) 5 mg tablet Take 5 mg by mouth daily      atorvastatin (LIPITOR) 40 mg tablet Take 40 mg by mouth daily   baclofen 10 mg tablet Take 10 mg by mouth 3 (three) times a day      CALCIUM PO Take 1 tablet by mouth daily      cephalexin (KEFLEX) 500 mg capsule Take 1 capsule (500 mg total) by mouth every 6 (six) hours for 10 days Start today 40 capsule 0    Cholecalciferol (VITAMIN D3) 5000 units CAPS Take 1 capsule (5,000 Units total) by mouth daily 30 capsule 6    Cyanocobalamin (VITAMIN B-12 PO) Take 1 tablet by mouth daily      ergocalciferol (VITAMIN D2) 50,000 units Take 50,000 Units by mouth once a week  0    fludrocortisone (FLORINEF) 0 1 mg tablet Take 1 tablet (0 1 mg total) by mouth daily  0    fluticasone-salmeterol (Advair Diskus) 500-50 mcg/dose inhaler Advair Diskus 500 mcg-50 mcg/dose powder for inhalation      folic acid (FOLVITE) 1 mg tablet Take 1 mg by mouth daily   3    gabapentin (NEURONTIN) 300 mg capsule TAKE 1 CAPSULE (300 MG TOTAL) BY MOUTH AS NEEDED (MIGRAINE) 30 capsule 0    gabapentin (NEURONTIN) 600 MG tablet Take 600 mg by mouth 3 (three) times a day      hydrocortisone 1 % lotion APPLY TOPICALLY 2 (TWO) TIMES A DAY INDICATIONS  USING ON FEET   hydrOXYzine HCL (ATARAX) 25 mg tablet Take 75 mg by mouth 2 (two) times a day as needed for anxiety       memantine (NAMENDA) 10 mg tablet TAKE 1 TABLET (10 MG TOTAL) BY MOUTH 2 (TWO) TIMES A DAY        Morphine Sulfate Microinfusion (MORPHINE SULF MICROINFUSION PF IJ) Inject 14 mg as directed daily Via infusion pump      nitroglycerin (NITROSTAT) 0 4 mg SL tablet Place 0 4 mg under the tongue every 5 (five) minutes as needed for chest pain (pt has not  used recently)   nystatin (MYCOSTATIN) cream Apply 1 application topically 2 (two) times a day      omeprazole (PriLOSEC) 20 mg delayed release capsule Take 40 mg by mouth daily      omeprazole (PriLOSEC) 40 MG capsule Take 40 mg by mouth daily      Pediatric Multivit-Minerals-C (Polyvitamin/Iron) CHEW Chew 1 tablet daily      potassium chloride (K-DUR,KLOR-CON) 20 mEq tablet TAKE 2 TABLETS (40 MEQ TOTAL) BY MOUTH 2 (TWO) TIMES A DAY   POTASSIUM PO Take 40 mEq by mouth 2 (two) times a day      prochlorperazine (COMPAZINE) 10 mg tablet Take 1 tablet (10 mg total) by mouth every 6 (six) hours as needed for nausea or vomiting (migraine) 20 tablet 0    sertraline (ZOLOFT) 100 mg tablet Take 1 5 tablets (150 mg total) by mouth daily (Patient taking differently: Take 200 mg by mouth daily )  0    tamsulosin (FLOMAX) 0 4 mg Take 0 4 mg by mouth daily with dinner   traZODone (DESYREL) 100 mg tablet Take 2 tablets (200 mg total) by mouth daily at bedtime as needed for sleep 14 tablet 0    ULTICARE SHORT PEN NEEDLES 31G X 8 MM MISC 4 INJECTIONS PER DAY DX  E11 8  1    doxycycline hyclate (VIBRAMYCIN) 100 mg capsule Take 1 capsule (100 mg total) by mouth every 12 (twelve) hours Start today 60 capsule 0     No current facility-administered medications for this visit          No Known Allergies    PAST HISTORY    Past Medical History:   Diagnosis Date    Acute on chronic diastolic congestive heart failure (HCC)     Altered gait     Alzheimer disease ([address] )     per patients,,early onset     Angina pectoris ([address] )     Anxiety     Arthritis     Brain aneurysm     coils placed    Cardiac disease     Chest pain 1/13/2016    Chronic pain     back/ right groin and rle- has morphine pump    Constipation     COPD (chronic obstructive pulmonary disease) (HCC)     Coronary artery disease     CPAP (continuous positive airway pressure) dependence     Decubital ulcer     sacral decub-occured 5/2019-sees wound care/debide in OR today 6/6/2019    Dependent on walker for ambulation     w/c for long distance    Depression     Diabetes mellitus (HCC)     insulin dependent    Dizziness     occ    Dysphagia     Enlarged prostate     Fall     GERD (gastroesophageal reflux disease)     [facility]     Hiatal hernia     Hx of gastric bypass 11/19/2018    Hypercholesterolemia     Hypertension     MI [facility]     2017- stents x2    Migraine     Morbid obesity [facility]     gastric bypass sleeve 11/2018-wt loss 125 lb    Neuropathy     Oxygen dependent     Q HS  2LPM with CPAP and prn during day 2-3 LPM     Pressure injury of skin     Renal disorder     Shortness of breath     Skin abnormality     sacral wound - covered with pad    Sleep apnea     Stented coronary artery     Stroke [facility]     vision loss b/l  2005, residual R leg weakness    Urinary frequency     Use of cane as ambulatory aid     Wears dentures     Wears glasses     Wears glasses     Wheelchair dependent        Past Surgical History:   Procedure Laterality Date    BACK SURGERY      BRAIN SURGERY      CARDIAC CATHETERIZATION      with stents    CEREBRAL ANEURYSM REPAIR      with coils    COLONOSCOPY      ESOPHAGOGASTRODUODENOSCOPY N/A 7/1/2016    Procedure: ESOPHAGOGASTRODUODENOSCOPY (EGD); Surgeon: Marielos Talley MD;  [facility]: [facility];   Service:     GASTRIC BYPASS  11/19/2018    HERNIA REPAIR      HIATAL HERNIA REPAIR      INFUSION PUMP IMPLANTATION Left     morphine    INTRATHECAL PUMP IMPLANTATION Left 7/9/2020    Procedure: REVISION INTRATHECAL PAIN PUMP POCKET, LEFT ABDOMEN;  Surgeon: Murphy Colon MD;  [facility];  Service: Neurosurgery    KNEE ARTHROSCOPY Right     KNEE ARTHROSCOPY Right     PERONEAL NERVE DECOMPRESSION Right     [place] DEBRIDEMENT OPEN WOUND 20 SQ CM< N/A 2019    Procedure: EXCISIONAL DEBRIDEMENT OF SACRAL DECUBITUS ULCER;  Surgeon: Sirena Meyers MD;  [facility];  Service: General    MA ESOPHAGOGASTRODUODENOSCOPY TRANSORAL DIAGNOSTIC N/A 2017    Procedure: ESOPHAGOGASTRODUODENOSCOPY (EGD); Surgeon: Shayna Stevens MD;  [facility]: [facility]; Service: Gastroenterology    MA ESOPHAGOGASTRODUODENOSCOPY TRANSORAL DIAGNOSTIC N/A 2018    Procedure: ESOPHAGOGASTRODUODENOSCOPY (EGD) with biopsy;  Surgeon: Shayna Stevens MD;  [facility]: [facility]; Service: Gastroenterology    MA INSERT SPINE INFUSN [facility] N/A 2017    Procedure: REMOVAL / Carli Hasten;  Surgeon: Brea Mims MD;  [facility];  Service: Orthopedics    MA INSERT SPINE INFUSN [facility] N/A 2016    Procedure: REPLACEMENT AND PROGRAM PUMP ;  Surgeon: Brea Mims MD;  [facility];  Service: Orthopedics       Social History     Tobacco Use    Smoking status: Never Smoker    Smokeless tobacco: Never Used   Substance Use Topics    Alcohol use: Not Currently    Drug use: Not Currently     Types: Marijuana     Comment: medical marijuana licence        Family History   Problem Relation Age of Onset    Diabetes unspecified Mother     Diabetes unspecified Brother     Diabetes unspecified Paternal Uncle     Diabetes unspecified Maternal Grandmother     Diabetes unspecified Paternal Grandmother     Diabetes unspecified Brother     Heart attack Father        The following portions of the patient's history were reviewed and updated as appropriate: allergies, current medications, past family history, past medical history, past social history, past surgical history and problem list       EXAM    Vitals:Blood pressure 120/80, temperature 98 °F (36 7 °C), temperature source Temporal, height 6' (1 829 m), weight 111 kg (245 lb)  ,Body mass index is 33 23 kg/m²  Physical Exam  Vitals signs and nursing note reviewed  Constitutional:       Appearance: Normal appearance  HENT:      Head: Normocephalic and atraumatic  Eyes:      General: No scleral icterus  Right eye: No discharge  Left eye: No discharge  Neck:      Musculoskeletal: Normal range of motion and neck supple  Cardiovascular:      Rate and Rhythm: Normal rate and regular rhythm  Pulmonary:      Effort: Pulmonary effort is normal  No respiratory distress  Abdominal:      Comments: Pendulous abdomen , loss muscle tone   Musculoskeletal:         General: No swelling  Right lower leg: Edema (trace) present  Left lower leg: Edema (trace) present  Skin:     General: Skin is warm and dry  Coloration: Skin is not pale  Neurological:      Mental Status: He is alert and oriented to person, place, and time  Mental status is at baseline  Psychiatric:         Mood and Affect: Mood normal          Behavior: Behavior normal          Neurologic Exam     Mental Status   Oriented to person, place, and time         Imaging Studies

## 2020-08-30 DIAGNOSIS — L08.9 INFLAMMATION, SKIN: ICD-10-CM

## 2020-08-30 RX ORDER — CEPHALEXIN 500 MG/1
500 CAPSULE ORAL EVERY 6 HOURS SCHEDULED
Qty: 40 CAPSULE | Refills: 0 | OUTPATIENT
Start: 2020-08-30 | End: 2020-09-09

## 2020-09-09 ENCOUNTER — TELEPHONE (OUTPATIENT)
Dept: NEUROSURGERY | Facility: CLINIC | Age: 60
End: 2020-09-09

## 2020-09-10 ENCOUNTER — OFFICE VISIT (OUTPATIENT)
Dept: NEUROSURGERY | Facility: CLINIC | Age: 60
End: 2020-09-10

## 2020-09-10 VITALS
BODY MASS INDEX: 33.59 KG/M2 | RESPIRATION RATE: 16 BRPM | WEIGHT: 248 LBS | DIASTOLIC BLOOD PRESSURE: 74 MMHG | HEART RATE: 67 BPM | TEMPERATURE: 98.1 F | SYSTOLIC BLOOD PRESSURE: 115 MMHG | HEIGHT: 72 IN

## 2020-09-10 DIAGNOSIS — Z97.8 PRESENCE OF INTRATHECAL PUMP: ICD-10-CM

## 2020-09-10 DIAGNOSIS — F11.20 OPIOID DEPENDENCE, CONTINUOUS (HCC): ICD-10-CM

## 2020-09-10 DIAGNOSIS — G89.4 CHRONIC PAIN DISORDER: ICD-10-CM

## 2020-09-10 DIAGNOSIS — L08.9 SKIN INFLAMMATION: Primary | ICD-10-CM

## 2020-09-10 PROCEDURE — 99024 POSTOP FOLLOW-UP VISIT: CPT | Performed by: NURSE PRACTITIONER

## 2020-09-10 NOTE — PROGRESS NOTES
Assessment/Plan:    Opioid dependence, continuous (HCC)  · Intra Thecal pain  pump delivers a continuous Morphine infusion and programed 5 additional bolus doses intermittent throughout the day, he denies independent use of a PTM device  · Pump managed by DR Jesse Giles    Chronic pain disorder  · As detailed in HPI  · Status post SCS trial   · Anticipating surgery for permanent insertion of a spinal cord stimulator after recovery from intrathecal pump pocket revision  · Chronic bilateral neuropathic pain secondary to diabetic neuropathy and chronic lumbar sacral nerve pain status post deep tissue injury from severe decubitus ulcers in area  Has extensive scaring in area  Presence of intrathecal pump  · As detailed in HPI   · Intrathecal Morphine Pain Pump form chronic pain management as detailed in HPI     Skin inflammation  · As detailed in HPI  · Streaking areas of inflammation of skin overlying intrathecal Morphine pump Left lower quadrant,overlying palpable warmth of medial skin overlying  pump  · Continues with perception that at time of bolus dose delivery experiences momentary burning sensation in area   · Denies tenderness to touch of skin overlying pain pump  · Incision intact no dehiscence or , drainage  · Is afebrile no fever or chills    · Refer to attachment photo    · Instructed to notify office immediately of changes in condition , inflammation appearance of incision worsens, dehiscence, drainage, or fever 101 or higher          Plan;  · Continue doxycycline  · Drug interaction with doxycycline -stop calcium and multiviti w/ iron supplements while taking medication  · RTO  in 1 weeks for reassess        There are no diagnoses linked to this encounter      Subjective:     Patient ID: Keyanna Ledezma is a 64 y o  male     HPI   Serial office visits since 8/20/20 when Comprehensive  Pain reported patient has a rash type appearance of skin overlying pump, concern for infection, the incision line is well healed  He has a intrathecal Morphine pain pump for chronic pain secondary to nerve injury 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 coiling via right femoral area  Reports delivers 50-60 % efficacy  Intrathecal pain pump flipped and migrated out of pocket  He is status post a significant weight loss since insertion  Underwent surgery with Dr Wang Swanson 7/9/20  REVISION [place], LEFT ABDOMEN (Left Abdomen)     After revision surgery plan was to insert a thoracici spinal cord stimulator, He completed a trial in June 2020 by dr José Silva for bilateral neuropathic pain secondary to diabetic neuropathy and chronic lumbosacral nerve pain status post deep tissue injury form severe decubitus  ulcers in area, has extensive scaring  Subsequent visits since 8/20 continues with rash type redness of skin overlying IT pump, and warmth to touch in comparison to other abdominal areas  Incision well healed, no signs of systemic effects , no fever, chills etc    Continues empiric ABX treatment  REVIEW OF SYSTEMS  Review of Systems   Constitutional: Negative  HENT: Negative  Eyes: Positive for visual disturbance (No peripheral vision )  Respiratory: Negative  Cardiovascular: Negative  Gastrointestinal: Negative  Endocrine: Negative  Genitourinary: Negative  Musculoskeletal: Positive for arthralgias, back pain (Low back pain into groin on the right side ) and gait problem (PAtient ambulates using a walker)  Right leg pain   "Everything hurts besides my hands" 9/10 generalized pain   Skin:        Here for follow up skin assessment   Allergic/Immunologic: Negative  Neurological: Positive for numbness (Neuropathy  )  Hematological: Bruises/bleeds easily (Patient on blood thinners )  Psychiatric/Behavioral: Negative            Meds/Allergies     Current Outpatient Medications   Medication Sig Dispense Refill    albuterol (ACCUNEB) 1 25 MG/3ML nebulizer solution Take 1 ampule by nebulization every 6 (six) hours as needed for wheezing      ARIPiprazole (ABILIFY) 5 mg tablet Take 10 mg by mouth daily       atorvastatin (LIPITOR) 40 mg tablet Take 40 mg by mouth daily   baclofen 10 mg tablet Take 10 mg by mouth 3 (three) times a day      CALCIUM PO Take 1 tablet by mouth daily      Cholecalciferol (VITAMIN D3) 5000 units CAPS Take 1 capsule (5,000 Units total) by mouth daily 30 capsule 6    Cyanocobalamin (VITAMIN B-12 PO) Take 1 tablet by mouth daily      doxycycline hyclate (VIBRAMYCIN) 100 mg capsule Take 1 capsule (100 mg total) by mouth every 12 (twelve) hours Start today 60 capsule 0    ergocalciferol (VITAMIN D2) 50,000 units Take 50,000 Units by mouth once a week  0    fludrocortisone (FLORINEF) 0 1 mg tablet Take 1 tablet (0 1 mg total) by mouth daily  0    fluticasone-salmeterol (Advair Diskus) 500-50 mcg/dose inhaler Advair Diskus 500 mcg-50 mcg/dose powder for inhalation      folic acid (FOLVITE) 1 mg tablet Take 1 mg by mouth daily   3    gabapentin (NEURONTIN) 300 mg capsule TAKE 1 CAPSULE (300 MG TOTAL) BY MOUTH AS NEEDED (MIGRAINE) 30 capsule 0    gabapentin (NEURONTIN) 600 MG tablet Take 600 mg by mouth 3 (three) times a day      hydrocortisone 1 % lotion APPLY TOPICALLY 2 (TWO) TIMES A DAY INDICATIONS  USING ON FEET   hydrOXYzine HCL (ATARAX) 25 mg tablet Take 75 mg by mouth 2 (two) times a day as needed for anxiety       memantine (NAMENDA) 10 mg tablet TAKE 1 TABLET (10 MG TOTAL) BY MOUTH 2 (TWO) TIMES A DAY        Morphine Sulfate Microinfusion (MORPHINE SULF MICROINFUSION PF IJ) Inject 14 mg as directed daily Via infusion pump      nystatin (MYCOSTATIN) cream Apply 1 application topically 2 (two) times a day      omeprazole (PriLOSEC) 40 MG capsule Take 40 mg by mouth daily      Pediatric Multivit-Minerals-C (Polyvitamin/Iron) CHEW Chew 1 tablet daily      potassium chloride (K-DUR,KLOR-CON) 20 mEq tablet TAKE 2 TABLETS (40 MEQ TOTAL) BY MOUTH 2 (TWO) TIMES A DAY   POTASSIUM PO Take 40 mEq by mouth 2 (two) times a day      prochlorperazine (COMPAZINE) 10 mg tablet Take 1 tablet (10 mg total) by mouth every 6 (six) hours as needed for nausea or vomiting (migraine) 20 tablet 0    sertraline (ZOLOFT) 100 mg tablet Take 1 5 tablets (150 mg total) by mouth daily (Patient taking differently: Take 200 mg by mouth daily )  0    tamsulosin (FLOMAX) 0 4 mg Take 0 4 mg by mouth daily with dinner   traZODone (DESYREL) 100 mg tablet Take 2 tablets (200 mg total) by mouth daily at bedtime as needed for sleep (Patient taking differently: Take 300 mg by mouth daily at bedtime as needed for sleep ) 14 tablet 0    nitroglycerin (NITROSTAT) 0 4 mg SL tablet Place 0 4 mg under the tongue every 5 (five) minutes as needed for chest pain (pt has not  used recently)   omeprazole (PriLOSEC) 20 mg delayed release capsule Take 40 mg by mouth daily      ULTICARE SHORT PEN NEEDLES 31G X 8 MM MISC 4 INJECTIONS PER DAY DX  E11 8  1     No current facility-administered medications for this visit          No Known Allergies    PAST HISTORY    Past Medical History:   Diagnosis Date    Acute on chronic diastolic congestive heart failure (HCC)     Altered gait     Alzheimer disease ([facility] )     per patients,,early onset     Angina pectoris ([facility] [facility]     Anxiety     Arthritis     Brain aneurysm     coils placed    Cardiac disease     Chest pain 1/13/2016    Chronic pain     back/ right groin and rle- has morphine pump    Constipation     COPD (chronic obstructive pulmonary disease) (HCC)     Coronary artery disease     CPAP (continuous positive airway pressure) dependence     Decubital ulcer     sacral decub-occured 5/2019-sees wound care/debide in OR today 6/6/2019    Dependent on walker for ambulation     w/c for long distance    Depression     Diabetes mellitus (HCC)     insulin dependent    Dizziness     occ    Dysphagia     Enlarged prostate     Fall     GERD (gastroesophageal reflux disease)     Heart failure (HCC)     Hiatal hernia     Hx of gastric bypass 11/19/2018    Hypercholesterolemia     Hypertension     MI ([facility]ocardial infarction) [facility] [address] [facility]     2017- stents x2    Migraine     Morbid obesity ([facility] [address] )     gastric bypass sleeve 11/2018-wt loss 125 lb    Neuropathy     Oxygen dependent     Q HS  2LPM with CPAP and prn during day 2-3 LPM     Pressure injury of skin     Renal disorder     Shortness of breath     Skin abnormality     sacral wound - covered with pad    Sleep apnea     Stented coronary artery     [facility]     vision loss b/l  2005, residual R leg weakness    Urinary frequency     Use of cane as ambulatory aid     Wears dentures     Wears glasses     Wears glasses     Wheelchair dependent        Past Surgical History:   Procedure Laterality Date    BACK SURGERY      BRAIN SURGERY      CARDIAC CATHETERIZATION      with stents    CEREBRAL ANEURYSM REPAIR      with coils    COLONOSCOPY      ESOPHAGOGASTRODUODENOSCOPY N/A 7/1/2016    Procedure: ESOPHAGOGASTRODUODENOSCOPY (EGD); Surgeon: Corine Godfrey MD;  [facility]: [facility]; Service:     GASTRIC BYPASS  11/19/2018    HERNIA REPAIR      HIATAL HERNIA REPAIR      INFUSION PUMP IMPLANTATION Left     morphine    INTRATHECAL PUMP IMPLANTATION Left 7/9/2020    Procedure: REVISION INTRATHECAL PAIN PUMP POCKET, LEFT ABDOMEN;  Surgeon: Paul Adan MD;  [facility];  Service: Neurosurgery    KNEE ARTHROSCOPY Right     KNEE ARTHROSCOPY Right     PERONEAL NERVE DECOMPRESSION Right     AR DEBRIDEMENT OPEN WOUND 20 SQ CM< N/A 6/6/2019    Procedure: EXCISIONAL DEBRIDEMENT OF SACRAL DECUBITUS ULCER;  Surgeon: Remigio Parekh MD;  [facility];  Service: General    AR ESOPHAGOGASTRODUODENOSCOPY TRANSORAL DIAGNOSTIC N/A 2/27/2017    Procedure: ESOPHAGOGASTRODUODENOSCOPY (EGD);   Surgeon: Corine Godrfey MD;  [facility]; Service: Gastroenterology    WI ESOPHAGOGASTRODUODENOSCOPY TRANSORAL DIAGNOSTIC N/A 2018    Procedure: ESOPHAGOGASTRODUODENOSCOPY (EGD) with biopsy;  Surgeon: Owen Hitchcock MD;  [facility]; Service: Gastroenterology    [place] INSERT SPINE INFUSN [facility] N/A 2017    Procedure: REMOVAL / Avonne Bradley;  Surgeon: Carlos Manuel Soriano MD;  [facility];  Service: Orthopedics    [place] INSERT SPINE INFUSN [facility] N/A 2016    Procedure: REPLACEMENT AND PROGRAM PUMP ;  Surgeon: Carlos Manuel Soriano MD;  [facility];  Service: Orthopedics       Social History     Tobacco Use    Smoking status: Never Smoker    Smokeless tobacco: Never Used   Substance Use Topics    Alcohol use: Not Currently    Drug use: Not Currently     Types: Marijuana     Comment: medical marijuana licence        Family History   Problem Relation Age of Onset    Diabetes unspecified Mother     Diabetes unspecified Brother     Diabetes unspecified Paternal Uncle     Diabetes unspecified Maternal Grandmother     Diabetes unspecified Paternal Grandmother     Diabetes unspecified Brother     Heart attack Father        The following portions of the patient's history were reviewed and updated as appropriate: allergies, current medications, past family history, past medical history, past social history, past surgical history and problem list       EXAM    Vitals:Blood pressure 115/74, pulse 67, temperature 98 1 °F (36 7 °C), temperature source Tympanic, resp  rate 16, height 6' (1 829 m), weight 112 kg (248 lb)  ,Body mass index is 33 63 kg/m²  Physical Exam  Vitals signs and nursing note reviewed  Constitutional:       Appearance: Normal appearance  HENT:      Head: Normocephalic and atraumatic  Eyes:      General: No scleral icterus  Right eye: No discharge  Left eye: No discharge        Conjunctiva/sclera: Conjunctivae normal    Cardiovascular:      Rate and Rhythm: Normal rate and regular rhythm  Pulmonary:      Effort: Pulmonary effort is normal  No respiratory distress  Skin:     General: Skin is warm and dry  Neurological:      Mental Status: He is alert and oriented to person, place, and time  Psychiatric:         Mood and Affect: Mood normal          Behavior: Behavior normal          Neurologic Exam     Mental Status   Oriented to person, place, and time       Gait, Coordination, and Reflexes     Gait  Gait: (use seated rolling walker)      Imaging Studies

## 2020-09-13 NOTE — ASSESSMENT & PLAN NOTE
· Intra Thecal pain  pump delivers a continuous Morphine infusion and programed 5 additional bolus doses intermittent throughout the day, he denies independent use of a PTM device     · Pump managed by DR Tami Salcido

## 2020-09-13 NOTE — ASSESSMENT & PLAN NOTE
· As detailed in HPI  · Streaking areas of inflammation of skin overlying intrathecal Morphine pump Left lower quadrant,overlying palpable warmth of medial skin overlying  pump  · Continues with perception that at time of bolus dose delivery experiences momentary burning sensation in area   · Denies tenderness to touch of skin overlying pain pump  · Incision intact no dehiscence or , drainage  · Is afebrile no fever or chills    · Refer to attachment photo    · Instructed to notify office immediately of changes in condition , inflammation appearance of incision worsens, dehiscence, drainage, or fever 101 or higher          Plan;  · Continue doxycycline    · Drug interaction with doxycycline -stop calcium and multiviti w/ iron supplements while taking medication  · RTO  in 1 weeks for reassess

## 2020-09-16 ENCOUNTER — TELEPHONE (OUTPATIENT)
Dept: NEUROSURGERY | Facility: CLINIC | Age: 60
End: 2020-09-16

## 2020-09-17 ENCOUNTER — OFFICE VISIT (OUTPATIENT)
Dept: NEUROSURGERY | Facility: CLINIC | Age: 60
End: 2020-09-17

## 2020-09-17 VITALS
TEMPERATURE: 98.7 F | SYSTOLIC BLOOD PRESSURE: 122 MMHG | WEIGHT: 249 LBS | BODY MASS INDEX: 33.72 KG/M2 | RESPIRATION RATE: 16 BRPM | HEIGHT: 72 IN | HEART RATE: 84 BPM | DIASTOLIC BLOOD PRESSURE: 86 MMHG

## 2020-09-17 DIAGNOSIS — L08.9 SKIN INFLAMMATION: ICD-10-CM

## 2020-09-17 DIAGNOSIS — G89.4 CHRONIC PAIN DISORDER: Primary | ICD-10-CM

## 2020-09-17 DIAGNOSIS — R10.32 ABDOMINAL PAIN, LEFT LOWER QUADRANT: ICD-10-CM

## 2020-09-17 DIAGNOSIS — Z01.818 PRE-PROCEDURAL EXAMINATION: ICD-10-CM

## 2020-09-17 DIAGNOSIS — Z97.8 PRESENCE OF INTRATHECAL PUMP: ICD-10-CM

## 2020-09-17 PROCEDURE — 99024 POSTOP FOLLOW-UP VISIT: CPT | Performed by: NEUROLOGICAL SURGERY

## 2020-09-17 NOTE — PROGRESS NOTES
Assessment/Plan:    Skin inflammation  · As detailed in HPI  · Streaking areas of inflammation of skin overlying intrathecal Morphine pump Left lower quadrant,overlying palpable warmth of medial skin overlying pump persist     · Continues with perception that at time of bolus dose delivery experiences momentary burning sensation in area   · Admits to tenderness  to touch of skin overlying pain pump to right of incision, area is warm to touch  · Incision intact no dehiscence or , drainage  · Is afebrile no fever or chills    · Refer to attachment photo    · Band-aid remains in place status post pump donna yesterday, reports pain management practitioner concerned for appearance of skin overlying pump was going to turn off bolus dosing  · Instructed to notify office immediately of changes in condition , inflammation appearance of incision worsens, dehiscence, drainage, or fever 101 or higher    · Completed empiric ABX  · CT abdomen with an dwithout contrast ordered to assess for fluid collection surrounding pump  · RTO after CT to discuss findings and reassess area         Presence of intrathecal pump  · As detailed in HPI   · Intrathecal Morphine Pain Pump form chronic pain management as detailed in HPI     Chronic pain disorder  · As detailed in HPI  · Status post SCS trial   · Anticipating surgery for permanent insertion of a spinal cord stimulator after recovery from intrathecal pump pocket revision  · Chronic bilateral neuropathic pain secondary to diabetic neuropathy and chronic lumbar sacral nerve pain status post deep tissue injury from severe decubitus ulcers in area  Has extensive scaring in area           Subjective:   Revisit for surgical site reassessment     Patient ID: Levar Bill is a 64 y o  male     [facility]   Serial office visits since 8/20/20 when Comprehensive  Pain reported patient has a rash type appearance of skin overlying pump, concern for infection, the incision line is well healed      He has a intrathecal Morphine pain pump for chronic pain secondary to nerve injury 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 coiling via right femoral area  Reports delivers 50-60 % efficacy      Intrathecal pain pump flipped and migrated out of pocket  He is status post a significant weight loss since insertion  Underwent surgery with Dr Samuel Bell, LEFT ABDOMEN (Left Abdomen)     After revision surgery plan was to insert a thoracici spinal cord stimulator, He completed a trial in June 2020 by dr Jenelle Palm for bilateral neuropathic pain secondary to diabetic neuropathy and chronic lumbosacral nerve pain status post deep tissue injury form severe decubitus  ulcers in area, has extensive scaring      Subsequent visits since 8/20 continues with rash type redness of skin overlying IT pump, and warmth to touch in comparison to other abdominal areas  Incision well healed, no signs of systemic effects , no fever, chills etc    Completed empiric ABX treatment  REVIEW OF SYSTEMS  Review of Systems   Constitutional: Negative  HENT: Negative  Eyes: Positive for visual disturbance (No peripheral vision right and top)  Respiratory: Negative  Cardiovascular: Negative  Gastrointestinal: Negative  Endocrine: Negative  Genitourinary: Negative  Musculoskeletal: Positive for arthralgias, back pain (Low back pain into groin on the right side ) and gait problem (PAtient ambulates using a walker)  Right leg pain   "Everything hurts besides my hands" 9/10 generalized pain  Skin:        Here for follow up skin assessment   Allergic/Immunologic: Negative  Neurological: Positive for weakness (generalized) and numbness (Neuropathy)  Hematological: Bruises/bleeds easily (Patient on blood thinners )  Psychiatric/Behavioral: Negative            Meds/Allergies     Current Outpatient Medications   Medication Sig Dispense Refill    albuterol (ACCUNEB) 1 25 MG/3ML nebulizer solution Take 1 ampule by nebulization every 6 (six) hours as needed for wheezing      ARIPiprazole (ABILIFY) 5 mg tablet Take 10 mg by mouth daily       atorvastatin (LIPITOR) 40 mg tablet Take 40 mg by mouth daily   baclofen 10 mg tablet Take 10 mg by mouth 3 (three) times a day      CALCIUM PO Take 1 tablet by mouth daily      Cholecalciferol (VITAMIN D3) 5000 units CAPS Take 1 capsule (5,000 Units total) by mouth daily 30 capsule 6    Cyanocobalamin (VITAMIN B-12 PO) Take 1 tablet by mouth daily      ergocalciferol (VITAMIN D2) 50,000 units Take 50,000 Units by mouth once a week  0    fludrocortisone (FLORINEF) 0 1 mg tablet Take 1 tablet (0 1 mg total) by mouth daily  0    fluticasone-salmeterol (Advair Diskus) 500-50 mcg/dose inhaler Advair Diskus 500 mcg-50 mcg/dose powder for inhalation      folic acid (FOLVITE) 1 mg tablet Take 1 mg by mouth daily   3    gabapentin (NEURONTIN) 300 mg capsule TAKE 1 CAPSULE (300 MG TOTAL) BY MOUTH AS NEEDED (MIGRAINE) 30 capsule 0    gabapentin (NEURONTIN) 600 MG tablet Take 600 mg by mouth 3 (three) times a day      hydrocortisone 1 % lotion APPLY TOPICALLY 2 (TWO) TIMES A DAY INDICATIONS  USING ON FEET   hydrOXYzine HCL (ATARAX) 25 mg tablet Take 75 mg by mouth 2 (two) times a day as needed for anxiety       memantine (NAMENDA) 10 mg tablet TAKE 1 TABLET (10 MG TOTAL) BY MOUTH 2 (TWO) TIMES A DAY   Morphine Sulfate Microinfusion (MORPHINE SULF MICROINFUSION PF IJ) Inject 14 mg as directed daily Via infusion pump      nystatin (MYCOSTATIN) cream Apply 1 application topically 2 (two) times a day      omeprazole (PriLOSEC) 40 MG capsule Take 40 mg by mouth daily      Pediatric Multivit-Minerals-C (Polyvitamin/Iron) CHEW Chew 1 tablet daily      potassium chloride (K-DUR,KLOR-CON) 20 mEq tablet TAKE 2 TABLETS (40 MEQ TOTAL) BY MOUTH 2 (TWO) TIMES A DAY        POTASSIUM PO Take 40 mEq by mouth 2 (two) times a day  prochlorperazine (COMPAZINE) 10 mg tablet Take 1 tablet (10 mg total) by mouth every 6 (six) hours as needed for nausea or vomiting (migraine) 20 tablet 0    sertraline (ZOLOFT) 100 mg tablet Take 1 5 tablets (150 mg total) by mouth daily (Patient taking differently: Take 200 mg by mouth daily )  0    tamsulosin (FLOMAX) 0 4 mg Take 0 4 mg by mouth daily with dinner   traZODone (DESYREL) 100 mg tablet Take 2 tablets (200 mg total) by mouth daily at bedtime as needed for sleep (Patient taking differently: Take 300 mg by mouth daily at bedtime as needed for sleep ) 14 tablet 0    nitroglycerin (NITROSTAT) 0 4 mg SL tablet Place 0 4 mg under the tongue every 5 (five) minutes as needed for chest pain (pt has not  used recently)   omeprazole (PriLOSEC) 20 mg delayed release capsule Take 40 mg by mouth daily      ULTICARE SHORT PEN NEEDLES 31G X 8 MM MISC 4 INJECTIONS PER DAY DX  E11 8  1     No current facility-administered medications for this visit          No Known Allergies    PAST HISTORY    Past Medical History:   Diagnosis Date    Acute on chronic diastolic congestive heart failure (HCC)     Altered gait     Alzheimer disease ([address] )     per patients,,early onset     Angina pectoris ([address] )     Anxiety     Arthritis     Brain aneurysm     coils placed    Cardiac disease     Chest pain 1/13/2016    Chronic pain     back/ right groin and rle- has morphine pump    Constipation     COPD (chronic obstructive pulmonary disease) (HCC)     Coronary artery disease     CPAP (continuous positive airway pressure) dependence     Decubital ulcer     sacral decub-occured 5/2019-sees wound care/debide in OR today 6/6/2019    Dependent on walker for ambulation     w/c for long distance    Depression     Diabetes mellitus (HCC)     insulin dependent    Dizziness     occ    Dysphagia     Enlarged prostate     Fall     GERD (gastroesophageal reflux disease)     Heart failure ([address] )     Hiatal hernia     Hx of gastric bypass 11/19/2018    Hypercholesterolemia     Hypertension     [facility]     2017- stents x2    Migraine     Morbid obesity ([facility]     gastric bypass sleeve 11/2018-wt loss 125 lb    Neuropathy     Oxygen dependent     Q HS  2LPM with CPAP and prn during day 2-3 LPM     Pressure injury of skin     Renal disorder     Shortness of breath     Skin abnormality     sacral wound - covered with pad    Sleep apnea     Stented coronary artery     Stroke ([facility]     vision loss b/l  2005, residual R leg weakness    Urinary frequency     Use of cane as ambulatory aid     Wears dentures     Wears glasses     Wears glasses     Wheelchair dependent        Past Surgical History:   Procedure Laterality Date    BACK SURGERY      BRAIN SURGERY      CARDIAC CATHETERIZATION      with stents    CEREBRAL ANEURYSM REPAIR      with coils    COLONOSCOPY      ESOPHAGOGASTRODUODENOSCOPY N/A 7/1/2016    Procedure: ESOPHAGOGASTRODUODENOSCOPY (EGD); Surgeon: Marielos Talley MD;  [facility]: [facility]; Service:     GASTRIC BYPASS  11/19/2018    HERNIA REPAIR      HIATAL HERNIA REPAIR      INFUSION PUMP IMPLANTATION Left     morphine    INTRATHECAL PUMP IMPLANTATION Left 7/9/2020    Procedure: REVISION INTRATHECAL PAIN PUMP POCKET, LEFT ABDOMEN;  Surgeon: Murphy Colon MD;  [facility];  Service: Neurosurgery    KNEE ARTHROSCOPY Right     KNEE ARTHROSCOPY Right     PERONEAL NERVE DECOMPRESSION Right     KS DEBRIDEMENT OPEN WOUND 20 SQ CM< N/A 6/6/2019    Procedure: EXCISIONAL DEBRIDEMENT OF SACRAL DECUBITUS ULCER;  Surgeon: Alvino Luna MD;  [facility];  Service: General    KS ESOPHAGOGASTRODUODENOSCOPY TRANSORAL DIAGNOSTIC N/A 2/27/2017    Procedure: ESOPHAGOGASTRODUODENOSCOPY (EGD); Surgeon: Marielos Talley MD;  [facility];   Service: Gastroenterology    KS ESOPHAGOGASTRODUODENOSCOPY TRANSORAL DIAGNOSTIC N/A 8/23/2018    Procedure: ESOPHAGOGASTRODUODENOSCOPY (EGD) with biopsy;  Surgeon: Marielos Talley MD;  [facility]; Service: Gastroenterology    VT INSERT SPINE INFUSN [facility] N/A 2017    Procedure: REMOVAL / Hershal Lydia;  Surgeon: Elmira Zavala MD;  [facility];  Service: Orthopedics    VT INSERT SPINE INFUSN [facility] N/A 2016    Procedure: REPLACEMENT AND PROGRAM PUMP ;  Surgeon: Elmira Zavala MD;  [facility];  Service: Orthopedics       Social History     Tobacco Use    Smoking status: Never Smoker    Smokeless tobacco: Never Used   Substance Use Topics    Alcohol use: Not Currently    Drug use: Not Currently     Types: Marijuana     Comment: medical marijuana licence        Family History   Problem Relation Age of Onset    Diabetes unspecified Mother     Diabetes unspecified Brother     Diabetes unspecified Paternal Uncle     Diabetes unspecified Maternal Grandmother     Diabetes unspecified Paternal Grandmother     Diabetes unspecified Brother     Heart attack Father        The following portions of the patient's history were reviewed and updated as appropriate: allergies, current medications, past family history, past medical history, past social history, past surgical history and problem list       EXAM    Vitals:Blood pressure 122/86, pulse 84, temperature 98 7 °F (37 1 °C), temperature source Tympanic, resp  rate 16, height 6' (1 829 m), weight 113 kg (249 lb)  ,Body mass index is 33 77 kg/m²       Physical Exam    Neurologic Exam    Imaging Studies

## 2020-09-18 NOTE — ASSESSMENT & PLAN NOTE
· As detailed in HPI  · Streaking areas of inflammation of skin overlying intrathecal Morphine pump Left lower quadrant,overlying palpable warmth of medial skin overlying pump persist     · Continues with perception that at time of bolus dose delivery experiences momentary burning sensation in area   · Admits to tenderness  to touch of skin overlying pain pump to right of incision, area is warm to touch  · Incision intact no dehiscence or , drainage  · Is afebrile no fever or chills    · Refer to attachment photo    · Band-aid remains in place status post pump donna yesterday, reports pain management practitioner concerned for appearance of skin overlying pump was going to turn off bolus dosing  · Instructed to notify office immediately of changes in condition , inflammation appearance of incision worsens, dehiscence, drainage, or fever 101 or higher    · Completed empiric ABX  · CT abdomen with an dwithout contrast ordered to assess for fluid collection surrounding pump    · RTO after CT to discuss findings and reassess area

## 2020-09-25 ENCOUNTER — APPOINTMENT (OUTPATIENT)
Dept: LAB | Facility: HOSPITAL | Age: 60
End: 2020-09-25
Payer: MEDICARE

## 2020-09-25 ENCOUNTER — HOSPITAL ENCOUNTER (OUTPATIENT)
Dept: CT IMAGING | Facility: HOSPITAL | Age: 60
Discharge: HOME | End: 2020-09-25
Payer: MEDICARE

## 2020-09-25 DIAGNOSIS — Z01.818 PRE-PROCEDURAL EXAMINATION: ICD-10-CM

## 2020-09-25 DIAGNOSIS — R10.32 ABDOMINAL PAIN, LEFT LOWER QUADRANT: ICD-10-CM

## 2020-09-25 LAB
BUN SERPL-MCNC: 10 MG/DL (ref 5–25)
CREAT SERPL-MCNC: 1.21 MG/DL (ref 0.6–1.3)
GFR SERPL CREATININE-BSD FRML MDRD: 66 ML/MIN/1.73SQ M

## 2020-09-25 PROCEDURE — 74177 CT ABD & PELVIS W/CONTRAST: CPT

## 2020-09-25 PROCEDURE — 36415 COLL VENOUS BLD VENIPUNCTURE: CPT

## 2020-09-25 PROCEDURE — 84520 ASSAY OF UREA NITROGEN: CPT

## 2020-09-25 PROCEDURE — 82565 ASSAY OF CREATININE: CPT

## 2020-09-25 PROCEDURE — G1004 CDSM NDSC: HCPCS

## 2020-09-25 RX ADMIN — IOHEXOL 100 ML: 350 INJECTION, SOLUTION INTRAVENOUS at 20:09

## 2020-09-28 ENCOUNTER — TELEPHONE (OUTPATIENT)
Dept: NEUROSURGERY | Facility: CLINIC | Age: 60
End: 2020-09-28

## 2020-09-29 NOTE — TELEPHONE ENCOUNTER
Notified by radiologist 9/28 CT abdomen and pelvis concerning findings  IMPRESSION:   Findings suspicious for leakage of small volume of medication deep to intrathecal pain pump device in the subcutaneous tissues of the lateral left abdomen  Findings correlate with clinic assessment concern  Patient with clinic appointment 10/1/20  Will schedule surgery for return to or for LLQ pump revision  Discussed w/surgery scheduler surgery date 10/13/20--patient in agreement with surgery date    10/1 appointment will be for H & P  And the preoperative OR process          Patient in no acute distress as per conversation , remains stable as during last appointment

## 2020-09-30 ENCOUNTER — TELEPHONE (OUTPATIENT)
Dept: NEUROSURGERY | Facility: CLINIC | Age: 60
End: 2020-09-30

## 2020-10-01 ENCOUNTER — OFFICE VISIT (OUTPATIENT)
Dept: NEUROSURGERY | Facility: CLINIC | Age: 60
End: 2020-10-01

## 2020-10-01 VITALS
TEMPERATURE: 97.4 F | WEIGHT: 251 LBS | HEART RATE: 69 BPM | DIASTOLIC BLOOD PRESSURE: 80 MMHG | RESPIRATION RATE: 16 BRPM | SYSTOLIC BLOOD PRESSURE: 138 MMHG | HEIGHT: 72 IN | BODY MASS INDEX: 34 KG/M2

## 2020-10-01 DIAGNOSIS — Z79.899 ENCOUNTER FOR LONG-TERM (CURRENT) USE OF MEDICATIONS: ICD-10-CM

## 2020-10-01 DIAGNOSIS — Z51.81 ADMISSION FOR LONG-TERM (CURRENT) USE OF ANTICOAGULANTS: ICD-10-CM

## 2020-10-01 DIAGNOSIS — G89.4 CHRONIC PAIN DISORDER: ICD-10-CM

## 2020-10-01 DIAGNOSIS — T85.610A MALFUNCTION OF INTRATHECAL INFUSION PUMP, INITIAL ENCOUNTER: Primary | ICD-10-CM

## 2020-10-01 DIAGNOSIS — Z01.818 PRE-PROCEDURAL EXAMINATION: ICD-10-CM

## 2020-10-01 DIAGNOSIS — Z97.8 PRESENCE OF INTRATHECAL PUMP: ICD-10-CM

## 2020-10-01 DIAGNOSIS — Z79.01 ADMISSION FOR LONG-TERM (CURRENT) USE OF ANTICOAGULANTS: ICD-10-CM

## 2020-10-01 PROCEDURE — PREOP: Performed by: NURSE PRACTITIONER

## 2020-10-02 RX ORDER — CHLORHEXIDINE GLUCONATE 0.12 MG/ML
15 RINSE ORAL ONCE
Status: CANCELLED | OUTPATIENT
Start: 2020-10-02 | End: 2020-10-02

## 2020-10-02 RX ORDER — SODIUM CHLORIDE, SODIUM LACTATE, POTASSIUM CHLORIDE, CALCIUM CHLORIDE 600; 310; 30; 20 MG/100ML; MG/100ML; MG/100ML; MG/100ML
50 INJECTION, SOLUTION INTRAVENOUS CONTINUOUS
Status: CANCELLED | OUTPATIENT
Start: 2020-10-13

## 2020-10-07 ENCOUNTER — TRANSCRIBE ORDERS (OUTPATIENT)
Dept: ADMINISTRATIVE | Facility: HOSPITAL | Age: 60
End: 2020-10-07

## 2020-10-07 ENCOUNTER — APPOINTMENT (OUTPATIENT)
Dept: LAB | Facility: HOSPITAL | Age: 60
End: 2020-10-07
Payer: MEDICARE

## 2020-10-07 DIAGNOSIS — Z51.81 ADMISSION FOR LONG-TERM (CURRENT) USE OF ANTICOAGULANTS: ICD-10-CM

## 2020-10-07 DIAGNOSIS — Z79.01 ADMISSION FOR LONG-TERM (CURRENT) USE OF ANTICOAGULANTS: ICD-10-CM

## 2020-10-07 DIAGNOSIS — Z79.899 ENCOUNTER FOR LONG-TERM (CURRENT) USE OF MEDICATIONS: ICD-10-CM

## 2020-10-07 DIAGNOSIS — Z97.8 PRESENCE OF INTRATHECAL PUMP: ICD-10-CM

## 2020-10-07 DIAGNOSIS — Z79.899 HIGH RISK MEDICATION USE: ICD-10-CM

## 2020-10-07 DIAGNOSIS — Z01.818 PRE-PROCEDURAL EXAMINATION: ICD-10-CM

## 2020-10-07 DIAGNOSIS — Z79.899 HIGH RISK MEDICATION USE: Primary | ICD-10-CM

## 2020-10-07 DIAGNOSIS — T85.610A MALFUNCTION OF INTRATHECAL INFUSION PUMP, INITIAL ENCOUNTER: ICD-10-CM

## 2020-10-07 LAB
ALBUMIN SERPL BCP-MCNC: 3.2 G/DL (ref 3.5–5)
ALP SERPL-CCNC: 74 U/L (ref 46–116)
ALT SERPL W P-5'-P-CCNC: 22 U/L (ref 12–78)
ANION GAP SERPL CALCULATED.3IONS-SCNC: 5 MMOL/L (ref 4–13)
APTT PPP: 31 SECONDS (ref 23–37)
AST SERPL W P-5'-P-CCNC: 14 U/L (ref 5–45)
BASOPHILS # BLD AUTO: 0.06 THOUSANDS/ΜL (ref 0–0.1)
BASOPHILS NFR BLD AUTO: 1 % (ref 0–1)
BILIRUB SERPL-MCNC: 0.74 MG/DL (ref 0.2–1)
BILIRUB UR QL STRIP: NEGATIVE
BUN SERPL-MCNC: 9 MG/DL (ref 5–25)
CALCIUM ALBUM COR SERPL-MCNC: 9.3 MG/DL (ref 8.3–10.1)
CALCIUM SERPL-MCNC: 8.7 MG/DL (ref 8.3–10.1)
CHLORIDE SERPL-SCNC: 107 MMOL/L (ref 100–108)
CHOLEST SERPL-MCNC: 147 MG/DL (ref 50–200)
CLARITY UR: CLEAR
CO2 SERPL-SCNC: 28 MMOL/L (ref 21–32)
COLOR UR: YELLOW
CREAT SERPL-MCNC: 1.16 MG/DL (ref 0.6–1.3)
EOSINOPHIL # BLD AUTO: 0.44 THOUSAND/ΜL (ref 0–0.61)
EOSINOPHIL NFR BLD AUTO: 5 % (ref 0–6)
ERYTHROCYTE [DISTWIDTH] IN BLOOD BY AUTOMATED COUNT: 12.1 % (ref 11.6–15.1)
EST. AVERAGE GLUCOSE BLD GHB EST-MCNC: 100 MG/DL
GFR SERPL CREATININE-BSD FRML MDRD: 70 ML/MIN/1.73SQ M
GLUCOSE P FAST SERPL-MCNC: 75 MG/DL (ref 65–99)
GLUCOSE UR STRIP-MCNC: NEGATIVE MG/DL
HBA1C MFR BLD: 5.1 %
HCT VFR BLD AUTO: 48.6 % (ref 36.5–49.3)
HDLC SERPL-MCNC: 28 MG/DL
HGB BLD-MCNC: 15.6 G/DL (ref 12–17)
HGB UR QL STRIP.AUTO: NEGATIVE
IMM GRANULOCYTES # BLD AUTO: 0.04 THOUSAND/UL (ref 0–0.2)
IMM GRANULOCYTES NFR BLD AUTO: 1 % (ref 0–2)
INR PPP: 1.15 (ref 0.84–1.19)
KETONES UR STRIP-MCNC: NEGATIVE MG/DL
LDLC SERPL CALC-MCNC: 98 MG/DL (ref 0–100)
LEUKOCYTE ESTERASE UR QL STRIP: NEGATIVE
LYMPHOCYTES # BLD AUTO: 2.12 THOUSANDS/ΜL (ref 0.6–4.47)
LYMPHOCYTES NFR BLD AUTO: 26 % (ref 14–44)
MCH RBC QN AUTO: 29.9 PG (ref 26.8–34.3)
MCHC RBC AUTO-ENTMCNC: 32.1 G/DL (ref 31.4–37.4)
MCV RBC AUTO: 93 FL (ref 82–98)
MONOCYTES # BLD AUTO: 0.6 THOUSAND/ΜL (ref 0.17–1.22)
MONOCYTES NFR BLD AUTO: 7 % (ref 4–12)
NEUTROPHILS # BLD AUTO: 5.05 THOUSANDS/ΜL (ref 1.85–7.62)
NEUTS SEG NFR BLD AUTO: 60 % (ref 43–75)
NITRITE UR QL STRIP: NEGATIVE
NONHDLC SERPL-MCNC: 119 MG/DL
NRBC BLD AUTO-RTO: 0 /100 WBCS
PH UR STRIP.AUTO: 6 [PH]
PLATELET # BLD AUTO: 194 THOUSANDS/UL (ref 149–390)
PMV BLD AUTO: 10 FL (ref 8.9–12.7)
POTASSIUM SERPL-SCNC: 3.6 MMOL/L (ref 3.5–5.3)
PROT SERPL-MCNC: 6.5 G/DL (ref 6.4–8.2)
PROT UR STRIP-MCNC: NEGATIVE MG/DL
PROTHROMBIN TIME: 14.5 SECONDS (ref 11.6–14.5)
RBC # BLD AUTO: 5.21 MILLION/UL (ref 3.88–5.62)
SODIUM SERPL-SCNC: 140 MMOL/L (ref 136–145)
SP GR UR STRIP.AUTO: 1.02 (ref 1–1.03)
TRIGL SERPL-MCNC: 107 MG/DL
UROBILINOGEN UR QL STRIP.AUTO: 1 E.U./DL
WBC # BLD AUTO: 8.31 THOUSAND/UL (ref 4.31–10.16)

## 2020-10-07 PROCEDURE — 85730 THROMBOPLASTIN TIME PARTIAL: CPT

## 2020-10-07 PROCEDURE — 80061 LIPID PANEL: CPT

## 2020-10-07 PROCEDURE — 81003 URINALYSIS AUTO W/O SCOPE: CPT | Performed by: NURSE PRACTITIONER

## 2020-10-07 PROCEDURE — 85025 COMPLETE CBC W/AUTO DIFF WBC: CPT

## 2020-10-07 PROCEDURE — 83036 HEMOGLOBIN GLYCOSYLATED A1C: CPT

## 2020-10-07 PROCEDURE — 36415 COLL VENOUS BLD VENIPUNCTURE: CPT

## 2020-10-07 PROCEDURE — 80053 COMPREHEN METABOLIC PANEL: CPT

## 2020-10-07 PROCEDURE — 85610 PROTHROMBIN TIME: CPT

## 2020-10-07 RX ORDER — CLOPIDOGREL BISULFATE 75 MG/1
75 TABLET ORAL DAILY
COMMUNITY
End: 2020-10-13 | Stop reason: HOSPADM

## 2020-10-09 ENCOUNTER — DOCUMENTATION (OUTPATIENT)
Dept: NEUROSURGERY | Facility: CLINIC | Age: 60
End: 2020-10-09

## 2020-10-12 ENCOUNTER — TELEPHONE (OUTPATIENT)
Dept: NEUROSURGERY | Facility: CLINIC | Age: 60
End: 2020-10-12

## 2020-10-12 ENCOUNTER — DOCUMENTATION (OUTPATIENT)
Dept: NEUROSURGERY | Facility: CLINIC | Age: 60
End: 2020-10-12

## 2020-10-12 DIAGNOSIS — Z79.2 PROPHYLACTIC ANTIBIOTIC: Primary | ICD-10-CM

## 2020-10-12 RX ORDER — CEPHALEXIN 500 MG/1
500 CAPSULE ORAL EVERY 6 HOURS SCHEDULED
Qty: 12 CAPSULE | Refills: 0 | Status: SHIPPED | OUTPATIENT
Start: 2020-10-12 | End: 2020-10-15

## 2020-10-13 ENCOUNTER — ANESTHESIA EVENT (OUTPATIENT)
Dept: PERIOP | Facility: HOSPITAL | Age: 60
End: 2020-10-13
Payer: MEDICARE

## 2020-10-13 ENCOUNTER — ANESTHESIA (OUTPATIENT)
Dept: PERIOP | Facility: HOSPITAL | Age: 60
End: 2020-10-13
Payer: MEDICARE

## 2020-10-13 ENCOUNTER — APPOINTMENT (OUTPATIENT)
Dept: RADIOLOGY | Facility: HOSPITAL | Age: 60
End: 2020-10-13
Payer: MEDICARE

## 2020-10-13 ENCOUNTER — HOSPITAL ENCOUNTER (OUTPATIENT)
Facility: HOSPITAL | Age: 60
Setting detail: OUTPATIENT SURGERY
Discharge: HOME | End: 2020-10-13
Attending: NEUROLOGICAL SURGERY | Admitting: NEUROLOGICAL SURGERY
Payer: MEDICARE

## 2020-10-13 VITALS
SYSTOLIC BLOOD PRESSURE: 164 MMHG | RESPIRATION RATE: 18 BRPM | OXYGEN SATURATION: 95 % | WEIGHT: 249.6 LBS | HEART RATE: 63 BPM | TEMPERATURE: 98.4 F | HEIGHT: 72 IN | DIASTOLIC BLOOD PRESSURE: 80 MMHG | BODY MASS INDEX: 33.81 KG/M2

## 2020-10-13 PROCEDURE — 62362 IMPLANT SPINE INFUSION PUMP: CPT | Performed by: PHYSICIAN ASSISTANT

## 2020-10-13 PROCEDURE — 62350 IMPLANT SPINAL CANAL CATH: CPT | Performed by: NEUROLOGICAL SURGERY

## 2020-10-13 PROCEDURE — 62350 IMPLANT SPINAL CANAL CATH: CPT | Performed by: PHYSICIAN ASSISTANT

## 2020-10-13 PROCEDURE — C1787 PATIENT PROGR, NEUROSTIM: HCPCS | Performed by: NEUROLOGICAL SURGERY

## 2020-10-13 PROCEDURE — C1755 CATHETER, INTRASPINAL: HCPCS | Performed by: NEUROLOGICAL SURGERY

## 2020-10-13 PROCEDURE — 62362 IMPLANT SPINE INFUSION PUMP: CPT | Performed by: NEUROLOGICAL SURGERY

## 2020-10-13 PROCEDURE — C1772 INFUSION PUMP, PROGRAMMABLE: HCPCS | Performed by: NEUROLOGICAL SURGERY

## 2020-10-13 PROCEDURE — 74018 RADEX ABDOMEN 1 VIEW: CPT

## 2020-10-13 DEVICE — CATHETER INTRATHECAL 114CM PUMP CONNECTOR ASCENDA: Type: IMPLANTABLE DEVICE | Status: FUNCTIONAL

## 2020-10-13 DEVICE — INTRATHECAL PAIN PUMP 20ML: Type: IMPLANTABLE DEVICE | Status: FUNCTIONAL

## 2020-10-13 RX ORDER — FENTANYL CITRATE 50 UG/ML
INJECTION, SOLUTION INTRAMUSCULAR; INTRAVENOUS AS NEEDED
Status: DISCONTINUED | OUTPATIENT
Start: 2020-10-13 | End: 2020-10-13

## 2020-10-13 RX ORDER — VANCOMYCIN HYDROCHLORIDE 1 G/20ML
INJECTION, POWDER, LYOPHILIZED, FOR SOLUTION INTRAVENOUS AS NEEDED
Status: DISCONTINUED | OUTPATIENT
Start: 2020-10-13 | End: 2020-10-13 | Stop reason: HOSPADM

## 2020-10-13 RX ORDER — CHLORHEXIDINE GLUCONATE 0.12 MG/ML
15 RINSE ORAL ONCE
Status: COMPLETED | OUTPATIENT
Start: 2020-10-13 | End: 2020-10-13

## 2020-10-13 RX ORDER — SODIUM CHLORIDE, SODIUM LACTATE, POTASSIUM CHLORIDE, CALCIUM CHLORIDE 600; 310; 30; 20 MG/100ML; MG/100ML; MG/100ML; MG/100ML
50 INJECTION, SOLUTION INTRAVENOUS CONTINUOUS
Status: DISCONTINUED | OUTPATIENT
Start: 2020-10-13 | End: 2020-10-13 | Stop reason: HOSPADM

## 2020-10-13 RX ORDER — CEFAZOLIN SODIUM 2 G/50ML
2000 SOLUTION INTRAVENOUS ONCE
Status: COMPLETED | OUTPATIENT
Start: 2020-10-13 | End: 2020-10-13

## 2020-10-13 RX ORDER — FENTANYL CITRATE/PF 50 MCG/ML
25 SYRINGE (ML) INJECTION
Status: COMPLETED | OUTPATIENT
Start: 2020-10-13 | End: 2020-10-13

## 2020-10-13 RX ORDER — OXYCODONE HYDROCHLORIDE 5 MG/1
5 TABLET ORAL EVERY 4 HOURS PRN
Status: DISCONTINUED | OUTPATIENT
Start: 2020-10-13 | End: 2020-10-13 | Stop reason: HOSPADM

## 2020-10-13 RX ORDER — DEXAMETHASONE SODIUM PHOSPHATE 10 MG/ML
INJECTION, SOLUTION INTRAMUSCULAR; INTRAVENOUS AS NEEDED
Status: DISCONTINUED | OUTPATIENT
Start: 2020-10-13 | End: 2020-10-13

## 2020-10-13 RX ORDER — ONDANSETRON 2 MG/ML
4 INJECTION INTRAMUSCULAR; INTRAVENOUS EVERY 6 HOURS PRN
Status: DISCONTINUED | OUTPATIENT
Start: 2020-10-13 | End: 2020-10-13 | Stop reason: HOSPADM

## 2020-10-13 RX ORDER — LIDOCAINE HYDROCHLORIDE 10 MG/ML
INJECTION, SOLUTION EPIDURAL; INFILTRATION; INTRACAUDAL; PERINEURAL AS NEEDED
Status: DISCONTINUED | OUTPATIENT
Start: 2020-10-13 | End: 2020-10-13

## 2020-10-13 RX ORDER — ROCURONIUM BROMIDE 10 MG/ML
INJECTION, SOLUTION INTRAVENOUS AS NEEDED
Status: DISCONTINUED | OUTPATIENT
Start: 2020-10-13 | End: 2020-10-13

## 2020-10-13 RX ORDER — NEOSTIGMINE METHYLSULFATE 1 MG/ML
INJECTION INTRAVENOUS AS NEEDED
Status: DISCONTINUED | OUTPATIENT
Start: 2020-10-13 | End: 2020-10-13

## 2020-10-13 RX ORDER — ONDANSETRON 2 MG/ML
INJECTION INTRAMUSCULAR; INTRAVENOUS AS NEEDED
Status: DISCONTINUED | OUTPATIENT
Start: 2020-10-13 | End: 2020-10-13

## 2020-10-13 RX ORDER — PROPOFOL 10 MG/ML
INJECTION, EMULSION INTRAVENOUS AS NEEDED
Status: DISCONTINUED | OUTPATIENT
Start: 2020-10-13 | End: 2020-10-13

## 2020-10-13 RX ORDER — GLYCOPYRROLATE 0.2 MG/ML
INJECTION INTRAMUSCULAR; INTRAVENOUS AS NEEDED
Status: DISCONTINUED | OUTPATIENT
Start: 2020-10-13 | End: 2020-10-13

## 2020-10-13 RX ORDER — EPHEDRINE SULFATE 50 MG/ML
INJECTION INTRAVENOUS AS NEEDED
Status: DISCONTINUED | OUTPATIENT
Start: 2020-10-13 | End: 2020-10-13

## 2020-10-13 RX ADMIN — SODIUM CHLORIDE, SODIUM LACTATE, POTASSIUM CHLORIDE, AND CALCIUM CHLORIDE: .6; .31; .03; .02 INJECTION, SOLUTION INTRAVENOUS at 11:21

## 2020-10-13 RX ADMIN — EPHEDRINE SULFATE 7.5 MG: 50 INJECTION, SOLUTION INTRAVENOUS at 12:37

## 2020-10-13 RX ADMIN — FENTANYL CITRATE 25 MCG: 50 INJECTION, SOLUTION INTRAMUSCULAR; INTRAVENOUS at 12:49

## 2020-10-13 RX ADMIN — EPHEDRINE SULFATE 7.5 MG: 50 INJECTION, SOLUTION INTRAVENOUS at 12:43

## 2020-10-13 RX ADMIN — FENTANYL CITRATE 25 MCG: 50 INJECTION, SOLUTION INTRAMUSCULAR; INTRAVENOUS at 12:29

## 2020-10-13 RX ADMIN — FENTANYL CITRATE 25 MCG: 50 INJECTION, SOLUTION INTRAMUSCULAR; INTRAVENOUS at 14:05

## 2020-10-13 RX ADMIN — DEXAMETHASONE SODIUM PHOSPHATE 4 MG: 10 INJECTION, SOLUTION INTRAMUSCULAR; INTRAVENOUS at 12:22

## 2020-10-13 RX ADMIN — FENTANYL CITRATE 25 MCG: 50 INJECTION, SOLUTION INTRAMUSCULAR; INTRAVENOUS at 12:22

## 2020-10-13 RX ADMIN — PROPOFOL 25 MG: 10 INJECTION, EMULSION INTRAVENOUS at 12:53

## 2020-10-13 RX ADMIN — CHLORHEXIDINE GLUCONATE 0.12% ORAL RINSE 15 ML: 1.2 LIQUID ORAL at 11:20

## 2020-10-13 RX ADMIN — FENTANYL CITRATE 25 MCG: 50 INJECTION, SOLUTION INTRAMUSCULAR; INTRAVENOUS at 12:05

## 2020-10-13 RX ADMIN — FENTANYL CITRATE 25 MCG: 50 INJECTION, SOLUTION INTRAMUSCULAR; INTRAVENOUS at 13:55

## 2020-10-13 RX ADMIN — FENTANYL CITRATE 25 MCG: 50 INJECTION, SOLUTION INTRAMUSCULAR; INTRAVENOUS at 13:32

## 2020-10-13 RX ADMIN — PROPOFOL 50 MG: 10 INJECTION, EMULSION INTRAVENOUS at 12:49

## 2020-10-13 RX ADMIN — ONDANSETRON 4 MG: 2 INJECTION INTRAMUSCULAR; INTRAVENOUS at 12:49

## 2020-10-13 RX ADMIN — LIDOCAINE HYDROCHLORIDE 15 MG: 10 INJECTION, SOLUTION EPIDURAL; INFILTRATION; INTRACAUDAL; PERINEURAL at 12:05

## 2020-10-13 RX ADMIN — ROCURONIUM BROMIDE 20 MG: 10 INJECTION, SOLUTION INTRAVENOUS at 12:07

## 2020-10-13 RX ADMIN — PROPOFOL 25 MG: 10 INJECTION, EMULSION INTRAVENOUS at 12:58

## 2020-10-13 RX ADMIN — FENTANYL CITRATE 25 MCG: 50 INJECTION, SOLUTION INTRAMUSCULAR; INTRAVENOUS at 13:40

## 2020-10-13 RX ADMIN — ROCURONIUM BROMIDE 15 MG: 10 INJECTION, SOLUTION INTRAVENOUS at 12:22

## 2020-10-13 RX ADMIN — CEFAZOLIN SODIUM 2000 MG: 2 SOLUTION INTRAVENOUS at 11:58

## 2020-10-13 RX ADMIN — GLYCOPYRROLATE 0.4 MG: 0.2 INJECTION, SOLUTION INTRAMUSCULAR; INTRAVENOUS at 12:50

## 2020-10-13 RX ADMIN — GLYCOPYRROLATE 0.1 MG: 0.2 INJECTION, SOLUTION INTRAMUSCULAR; INTRAVENOUS at 12:35

## 2020-10-13 RX ADMIN — OXYCODONE HYDROCHLORIDE 5 MG: 5 TABLET ORAL at 14:31

## 2020-10-13 RX ADMIN — NEOSTIGMINE METHYLSULFATE 2 MG: 1 INJECTION, SOLUTION INTRAVENOUS at 12:50

## 2020-10-13 RX ADMIN — PROPOFOL 150 MG: 10 INJECTION, EMULSION INTRAVENOUS at 12:06

## 2020-10-13 RX ADMIN — PROPOFOL 25 MG: 10 INJECTION, EMULSION INTRAVENOUS at 12:55

## 2020-10-14 ENCOUNTER — TELEMEDICINE (OUTPATIENT)
Dept: NEUROSURGERY | Facility: CLINIC | Age: 60
End: 2020-10-14

## 2020-10-14 ENCOUNTER — DOCUMENTATION (OUTPATIENT)
Dept: NEUROSURGERY | Facility: CLINIC | Age: 60
End: 2020-10-14

## 2020-10-14 DIAGNOSIS — Z98.890 POST-OPERATIVE STATE: Primary | ICD-10-CM

## 2020-10-14 PROCEDURE — 99024 POSTOP FOLLOW-UP VISIT: CPT

## 2020-10-28 ENCOUNTER — OFFICE VISIT (OUTPATIENT)
Dept: NEUROSURGERY | Facility: CLINIC | Age: 60
End: 2020-10-28

## 2020-10-28 VITALS
SYSTOLIC BLOOD PRESSURE: 158 MMHG | HEIGHT: 72 IN | WEIGHT: 247 LBS | DIASTOLIC BLOOD PRESSURE: 80 MMHG | TEMPERATURE: 97.4 F | HEART RATE: 73 BPM | BODY MASS INDEX: 33.46 KG/M2 | RESPIRATION RATE: 16 BRPM

## 2020-10-28 DIAGNOSIS — G89.4 CHRONIC PAIN DISORDER: ICD-10-CM

## 2020-10-28 DIAGNOSIS — Z97.8 PRESENCE OF INTRATHECAL PUMP: ICD-10-CM

## 2020-10-28 DIAGNOSIS — F11.20 OPIOID DEPENDENCE, CONTINUOUS (HCC): ICD-10-CM

## 2020-10-28 DIAGNOSIS — T85.610A MALFUNCTION OF INTRATHECAL INFUSION PUMP, INITIAL ENCOUNTER: Primary | ICD-10-CM

## 2020-10-28 PROCEDURE — 99024 POSTOP FOLLOW-UP VISIT: CPT | Performed by: NURSE PRACTITIONER

## 2020-10-28 RX ORDER — CLOPIDOGREL BISULFATE 75 MG/1
75 TABLET ORAL DAILY
COMMUNITY

## 2020-11-06 ENCOUNTER — TELEPHONE (OUTPATIENT)
Dept: NEUROSURGERY | Facility: CLINIC | Age: 60
End: 2020-11-06

## 2020-12-03 ENCOUNTER — OFFICE VISIT (OUTPATIENT)
Dept: NEUROSURGERY | Facility: CLINIC | Age: 60
End: 2020-12-03

## 2020-12-03 VITALS
BODY MASS INDEX: 33.46 KG/M2 | DIASTOLIC BLOOD PRESSURE: 68 MMHG | SYSTOLIC BLOOD PRESSURE: 118 MMHG | HEART RATE: 82 BPM | WEIGHT: 247 LBS | RESPIRATION RATE: 16 BRPM | HEIGHT: 72 IN | TEMPERATURE: 98.2 F

## 2020-12-03 DIAGNOSIS — G89.4 CHRONIC PAIN SYNDROME: Primary | ICD-10-CM

## 2020-12-03 PROCEDURE — 99024 POSTOP FOLLOW-UP VISIT: CPT | Performed by: NEUROLOGICAL SURGERY

## 2021-02-25 ENCOUNTER — OFFICE VISIT (OUTPATIENT)
Dept: NEUROSURGERY | Facility: CLINIC | Age: 61
End: 2021-02-25
Payer: MEDICARE

## 2021-02-25 VITALS
RESPIRATION RATE: 16 BRPM | SYSTOLIC BLOOD PRESSURE: 140 MMHG | WEIGHT: 256 LBS | BODY MASS INDEX: 34.67 KG/M2 | DIASTOLIC BLOOD PRESSURE: 84 MMHG | HEIGHT: 72 IN | HEART RATE: 74 BPM | TEMPERATURE: 98.2 F

## 2021-02-25 DIAGNOSIS — G89.4 CHRONIC PAIN SYNDROME: Primary | ICD-10-CM

## 2021-02-25 DIAGNOSIS — M54.16 LUMBAR RADICULOPATHY: ICD-10-CM

## 2021-02-25 PROCEDURE — 99215 OFFICE O/P EST HI 40 MIN: CPT | Performed by: NEUROLOGICAL SURGERY

## 2021-02-25 RX ORDER — CEFAZOLIN SODIUM 1 G/50ML
1000 SOLUTION INTRAVENOUS ONCE
Status: CANCELLED | OUTPATIENT
Start: 2021-02-25 | End: 2021-02-25

## 2021-02-25 RX ORDER — CHLORHEXIDINE GLUCONATE 0.12 MG/ML
15 RINSE ORAL ONCE
Status: CANCELLED | OUTPATIENT
Start: 2021-02-25 | End: 2021-02-25

## 2021-02-25 NOTE — PROGRESS NOTES
Assessment/Plan:    No problem-specific Assessment & Plan notes found for this encounter  Patient is stable  Symptoms, as detailed in HPI, continue to significantly impact of patient's quality of life in daily activities  After carefully considering presentation, investigations, functional status and co-morbidities, the risk/benefit profile of surgical intervention is favorable  History, physical examination and diagnostic tests were reviewed and questions answered  Diagnosis, care plan and treatment options were discussed  The patient understand instructions and will follow up as directed  Patient with chronic low back and right leg pain with successful medtronic trial with > 50% relief of chronic pain symptoms  He also has a pain pump  OR for percutaneous implantation  Expected postoperative course, including activity restrictions, expected pain and postoperative medication were reviewed  Patient provided verbal consent to surgical procedure and signed consent form: Yes    We also discussed the risks and benefits of the procedure the risks being including but not limited too: infection (~2%), neurologic injury (<1%), new pain, revisions surgery, failure to relieve pain, hardware issues  The benefits including relief of pain  The patient stated understanding of the risks and benefits and agreed to proceed          Diagnoses and all orders for this visit:    Chronic pain syndrome  -     Case request operating room: INSERTION THORACIC DORSAL COLUMN SPINAL CORD STIMULATOR PERCUTANEOUS W IMPLANTABLE PULSE GENERATOR, RIGHT; Standing  -     Case request operating room: INSERTION THORACIC DORSAL COLUMN SPINAL CORD STIMULATOR PERCUTANEOUS W IMPLANTABLE PULSE GENERATOR, RIGHT    Lumbar radiculopathy  -     Case request operating room: INSERTION THORACIC DORSAL COLUMN SPINAL CORD STIMULATOR PERCUTANEOUS W IMPLANTABLE PULSE GENERATOR, RIGHT; Standing  -     Case request operating room: INSERTION THORACIC DORSAL COLUMN SPINAL CORD STIMULATOR PERCUTANEOUS W IMPLANTABLE PULSE GENERATOR, RIGHT    Other orders  -     Diet NPO; Sips with meds; Standing  -     Nursing Communication [id number] Interventions Implemented; Standing  -     Nursing Communication CHG bath, have staff wash entire body (neck down) per pre-op bathing protocol  Routine, evening prior to, and day of surgery ; Standing  -     Nursing Communication Swab both nares with Povidone-Iodine solution, EXCLUDE if patient has shellfish/Iodine allergy  Routine, day of surgery, on call to OR; Standing  -     chlorhexidine (PERIDEX) 0 12 % oral rinse 15 mL  -     Void on call to OR; Standing  -     Insert peripheral IV; Standing  -     ceFAZolin (ANCEF) IVPB (premix in dextrose) 1,000 mg 50 mL        I have spent 40 minutes with Patient  today in which greater than 50% of this time was spent in counseling/coordination of care regarding Diagnostic results, Prognosis, Risks and benefits of tx options, Intructions for management, Patient and family education, Importance of tx compliance, Risk factor reductions and Impressions  Subjective:      Patient ID: Rancho Bueno is a 64 y o  male  Patient is a 64year old male with symptoms of low back and right leg pain  Pain is severe and radiating in quality  Pain distribution is low back and right leg  Pain is worse with activity  Pain is improved by rest  The pain has been present for several years  Pain has associated distress and reduced ambulation  The patient underwent a successful medtronic trial with greater than 50 % relief of pain  They reported improvement in activity  They are ready to proceed with surgery           The following portions of the patient's history were reviewed and updated as appropriate:   He  has a past medical history of Acute on chronic diastolic congestive heart failure ([address] ), Altered gait, Alzheimer disease ([address] ), Angina pectoris ([address] ), Anxiety, Arthritis, Brain aneurysm, Cardiac disease, Chest pain (1/13/2016), Chronic pain, Constipation, COPD (chronic obstructive pulmonary disease) ([facility] [address] ), Coronary artery disease, CPAP (continuous positive airway pressure) dependence, Decubital ulcer, Dependent on walker for ambulation, Depression, Diabetes mellitus ([address] ), Dizziness, Dysphagia, Enlarged prostate, Fall, GERD (gastroesophageal reflux disease), Heart failure ([address] ), Hiatal hernia, gastric bypass (11/19/2018), Hypercholesterolemia, Hypertension, MI (myocardial infarction) ([address] ), Migraine, Morbid obesity ([address] ), Neuropathy, Oxygen dependent, Pressure injury of skin, Renal disorder, Shortness of breath, Skin abnormality, Sleep apnea, Stented coronary artery, Stroke [facility]), Urinary frequency, Use of cane as ambulatory aid, Wears dentures, Wears glasses, Wears glasses, and Wheelchair dependent    He   Patient Active Problem List    Diagnosis Date Noted    Skin inflammation 08/21/2020    Aftercare following surgery 07/23/2020    Radiculopathy, lumbosacral region 05/08/2020    Neuropathy 05/08/2020    Presence of intrathecal pump 05/08/2020    Malfunction of intrathecal infusion pump 05/08/2020    Preoperative examination 05/08/2020    Major depressive disorder, recurrent, moderate ([address] ) 01/30/2020    COPD (chronic obstructive pulmonary disease) ([facility] [address] ) 01/29/2020    Type 2 diabetes mellitus with diabetic neuropathy, with long-term current use of insulin ([facility] [address] ) 10/18/2019    Mild cognitive impairment 10/18/2019    Primary osteoarthritis of both knees 08/27/2019    Orthostatic hypotension 08/12/2019    Bilateral foot pain 08/11/2019    Symptomatic bradycardia 08/08/2019    Pressure injury of skin of sacral region 06/03/2019    Pain and swelling of left knee 05/05/2019    Fall at home 05/03/2019    Vitamin D deficiency 08/01/2018    Chronic migraine without aura without status migrainosus, not intractable 02/05/2018    Esophageal dysphagia 01/31/2018    Opioid dependence, continuous ([facility] ) 11/06/2017    GERD (gastroesophageal reflux disease)     Stage 3 chronic kidney disease 10/12/2017    Bilateral leg edema 09/05/2017    Chronic respiratory failure ([place] [address] ) 09/05/2017    Alzheimer disease (HCC)     Chronic pain disorder     Coronary artery disease     Sleep apnea     Stroke ([place] [address] )     Stented coronary artery     Obstructive sleep apnea syndrome     CPAP (continuous positive airway pressure) dependence     Brain aneurysm     Morbid obesity ([place] [address] ) 01/15/2016    CVA (cerebral vascular accident) ([place] [address] ) 01/15/2016    Coronary artery disease involving native coronary artery 01/14/2016    Type 2 diabetes mellitus with renal complication (HCC)     Chronic diastolic congestive heart failure ([place] [address] )     Hyperlipidemia 02/09/2015     He  has a past surgical history that includes Hernia repair; Knee arthroscopy (Right); Brain surgery; Back surgery; Knee arthroscopy (Right); Cerebral aneurysm repair; Peroneal nerve decompression (Right); Cardiac catheterization; Hiatal hernia repair; pr esophagogastroduodenoscopy transoral diagnostic (N/A, 2/27/2017); pr insert spine infusn device,subcut (N/A, 1/19/2017); Esophagogastroduodenoscopy (N/A, 7/1/2016); pr insert spine infusn device,subcut (N/A, 5/16/2016); Colonoscopy; pr esophagogastroduodenoscopy transoral diagnostic (N/A, 8/23/2018); Gastric bypass (11/19/2018); Infusion pump implantation (Left); pr debridement open wound 20 sq cm< (N/A, 6/6/2019); Intrathecal pump implantation (Left, 7/9/2020); and pr insert/ replace infusn pump,programmable (Left, 10/13/2020)  His family history includes Diabetes unspecified in his brother, brother, maternal grandmother, mother, paternal grandmother, and paternal uncle; Heart attack in his father  He  reports that he has never smoked  He has never used smokeless tobacco  He reports previous alcohol use  He reports previous drug use  Drug: Marijuana    Current Outpatient Medications   Medication Sig Dispense Refill    albuterol (ACCUNEB) 1 25 MG/3ML nebulizer solution Take 1 ampule by nebulization every 6 (six) hours as needed for wheezing      ARIPiprazole (ABILIFY) 5 mg tablet Take 10 mg by mouth daily       atorvastatin (LIPITOR) 40 mg tablet Take 40 mg by mouth daily   baclofen 10 mg tablet Take 10 mg by mouth 3 (three) times a day      CALCIUM PO Take 1 tablet by mouth daily      Cholecalciferol (VITAMIN D3) 5000 units CAPS Take 1 capsule (5,000 Units total) by mouth daily 30 capsule 6    clopidogrel (PLAVIX) 75 mg tablet Take 75 mg by mouth daily      Cyanocobalamin (VITAMIN B-12 PO) Take 1 tablet by mouth daily      ergocalciferol (VITAMIN D2) 50,000 units Take 50,000 Units by mouth once a week  0    fludrocortisone (FLORINEF) 0 1 mg tablet Take 1 tablet (0 1 mg total) by mouth daily  0    folic acid (FOLVITE) 1 mg tablet Take 1 mg by mouth daily   3    gabapentin (NEURONTIN) 300 mg capsule TAKE 1 CAPSULE (300 MG TOTAL) BY MOUTH AS NEEDED (MIGRAINE) 30 capsule 0    gabapentin (NEURONTIN) 600 MG tablet Take 600 mg by mouth 3 (three) times a day      hydrocortisone 1 % lotion APPLY TOPICALLY 2 (TWO) TIMES A DAY INDICATIONS  USING ON FEET   hydrOXYzine HCL (ATARAX) 25 mg tablet Take 75 mg by mouth 2 (two) times a day as needed for anxiety       memantine (NAMENDA) 10 mg tablet TAKE 1 TABLET (10 MG TOTAL) BY MOUTH 2 (TWO) TIMES A DAY   Morphine Sulfate Microinfusion (MORPHINE SULF MICROINFUSION [facility]) Inject 14 mg as directed daily Via infusion pump      nitroglycerin (NITROSTAT) 0 4 mg SL tablet Place 0 4 mg under the tongue every 5 (five) minutes as needed for chest pain (pt has not  used recently)          nystatin (MYCOSTATIN) cream Apply 1 application topically 2 (two) times a day      omeprazole (PriLOSEC) 40 MG capsule Take 40 mg by mouth daily      Pediatric Multivit-Minerals-C (Polyvitamin/Iron) CHEW Chew 1 tablet daily      POTASSIUM PO Take 40 mEq by mouth 2 (two) times a day      prochlorperazine (COMPAZINE) 10 mg tablet Take 1 tablet (10 mg total) by mouth every 6 (six) hours as needed for nausea or vomiting (migraine) 20 tablet 0    sertraline (ZOLOFT) 100 mg tablet Take 1 5 tablets (150 mg total) by mouth daily (Patient taking differently: Take 200 mg by mouth daily )  0    tamsulosin (FLOMAX) 0 4 mg Take 0 4 mg by mouth daily with dinner   traZODone (DESYREL) 100 mg tablet Take 2 tablets (200 mg total) by mouth daily at bedtime as needed for sleep (Patient taking differently: Take 300 mg by mouth daily at bedtime as needed for sleep ) 14 tablet 0    ULTICARE SHORT PEN NEEDLES 31G X 8 MM MISC 4 INJECTIONS PER DAY DX  E11 8  1     No current facility-administered medications for this visit  Current Outpatient Medications on File Prior to Visit   Medication Sig    albuterol (ACCUNEB) 1 25 MG/3ML nebulizer solution Take 1 ampule by nebulization every 6 (six) hours as needed for wheezing    ARIPiprazole (ABILIFY) 5 mg tablet Take 10 mg by mouth daily     atorvastatin (LIPITOR) 40 mg tablet Take 40 mg by mouth daily   baclofen 10 mg tablet Take 10 mg by mouth 3 (three) times a day    CALCIUM PO Take 1 tablet by mouth daily    Cholecalciferol (VITAMIN D3) 5000 units CAPS Take 1 capsule (5,000 Units total) by mouth daily    clopidogrel (PLAVIX) 75 mg tablet Take 75 mg by mouth daily    Cyanocobalamin (VITAMIN B-12 PO) Take 1 tablet by mouth daily    ergocalciferol (VITAMIN D2) 50,000 units Take 50,000 Units by mouth once a week    fludrocortisone (FLORINEF) 0 1 mg tablet Take 1 tablet (0 1 mg total) by mouth daily    folic acid (FOLVITE) 1 mg tablet Take 1 mg by mouth daily     gabapentin (NEURONTIN) 300 mg capsule TAKE 1 CAPSULE (300 MG TOTAL) BY MOUTH AS NEEDED (MIGRAINE)    gabapentin (NEURONTIN) 600 MG tablet Take 600 mg by mouth 3 (three) times a day    hydrocortisone 1 % lotion APPLY TOPICALLY 2 (TWO) TIMES A DAY INDICATIONS  USING ON FEET   hydrOXYzine HCL (ATARAX) 25 mg tablet Take 75 mg by mouth 2 (two) times a day as needed for anxiety     memantine (NAMENDA) 10 mg tablet TAKE 1 TABLET (10 MG TOTAL) BY MOUTH 2 (TWO) TIMES A DAY   Morphine Sulfate Microinfusion (MORPHINE SULF MICROINFUSION [facility]) Inject 14 mg as directed daily Via infusion pump    nitroglycerin (NITROSTAT) 0 4 mg SL tablet Place 0 4 mg under the tongue every 5 (five) minutes as needed for chest pain (pt has not  used recently)   nystatin (MYCOSTATIN) cream Apply 1 application topically 2 (two) times a day    omeprazole (PriLOSEC) 40 MG capsule Take 40 mg by mouth daily    Pediatric Multivit-Minerals-C (Polyvitamin/Iron) CHEW Chew 1 tablet daily    POTASSIUM PO Take 40 mEq by mouth 2 (two) times a day    prochlorperazine (COMPAZINE) 10 mg tablet Take 1 tablet (10 mg total) by mouth every 6 (six) hours as needed for nausea or vomiting (migraine)    sertraline (ZOLOFT) 100 mg tablet Take 1 5 tablets (150 mg total) by mouth daily (Patient taking differently: Take 200 mg by mouth daily )    tamsulosin (FLOMAX) 0 4 mg Take 0 4 mg by mouth daily with dinner   traZODone (DESYREL) 100 mg tablet Take 2 tablets (200 mg total) by mouth daily at bedtime as needed for sleep (Patient taking differently: Take 300 mg by mouth daily at bedtime as needed for sleep )    ULTICARE SHORT PEN NEEDLES 31G X 8 MM MISC 4 INJECTIONS PER DAY DX  E11 8     No current facility-administered medications on file prior to visit  He has No Known Allergies       Review of Systems   Constitutional: Negative  HENT: Negative  Eyes: Negative for visual disturbance (No peripheral vision right and top )  Respiratory: Negative  Cardiovascular: Negative  Gastrointestinal: Positive for constipation  Endocrine: Negative  Genitourinary: Difficulty urinating: improving     Musculoskeletal: Positive for arthralgias, back pain (Low back pain into groin on the right side ) and gait problem (PAtient ambulates using a walker  )  Burning pain in bilateral buttocks as well in bi/feet  SCS trial done by Dr Angela Echevarria  Morphine pump placed   Skin: Positive for wound (surgical incisions)  Allergic/Immunologic: Negative  Neurological: Positive for numbness (Neuropathy ) and headaches  Negative for dizziness and light-headedness  Weakness: generalized  Hematological: Bruises/bleeds easily  Psychiatric/Behavioral: Negative  I have personally reviewed all aspects of the review of systems as documented    Objective:      /84 (BP Location: Right arm)   Pulse 74   Temp 98 2 °F (36 8 °C) (Tympanic)   Resp 16   Ht 6' (1 829 m)   Wt 116 kg (256 lb)   BMI 34 72 kg/m²          Physical Exam  Constitutional:       Appearance: Normal appearance  He is normal weight  HENT:      Head: Normocephalic and atraumatic  Eyes:      General:         Right eye: No discharge  Left eye: No discharge  Extraocular Movements: Extraocular movements intact  Conjunctiva/sclera: Conjunctivae normal       Pupils: Pupils are equal, round, and reactive to light  Cardiovascular:      Rate and Rhythm: Normal rate and regular rhythm  Pulmonary:      Effort: Pulmonary effort is normal  No respiratory distress  Musculoskeletal: Normal range of motion  Skin:     General: Skin is warm and dry  Neurological:      General: No focal deficit present  Mental Status: He is alert and oriented to person, place, and time  Mental status is at baseline  Psychiatric:         Mood and Affect: Mood normal          Thought Content:  Thought content normal

## 2021-02-25 NOTE — H&P (VIEW-ONLY)
Assessment/Plan:    No problem-specific Assessment & Plan notes found for this encounter  Patient is stable  Symptoms, as detailed in HPI, continue to significantly impact of patient's quality of life in daily activities  After carefully considering presentation, investigations, functional status and co-morbidities, the risk/benefit profile of surgical intervention is favorable  History, physical examination and diagnostic tests were reviewed and questions answered  Diagnosis, care plan and treatment options were discussed  The patient understand instructions and will follow up as directed  Patient with chronic low back and right leg pain with successful medtronic trial with > 50% relief of chronic pain symptoms  He also has a pain pump  OR for percutaneous implantation  Expected postoperative course, including activity restrictions, expected pain and postoperative medication were reviewed  Patient provided verbal consent to surgical procedure and signed consent form: Yes    We also discussed the risks and benefits of the procedure the risks being including but not limited too: infection (~2%), neurologic injury (<1%), new pain, revisions surgery, failure to relieve pain, hardware issues  The benefits including relief of pain  The patient stated understanding of the risks and benefits and agreed to proceed          Diagnoses and all orders for this visit:    Chronic pain syndrome  -     Case request operating room: INSERTION THORACIC DORSAL COLUMN SPINAL CORD STIMULATOR PERCUTANEOUS W IMPLANTABLE PULSE GENERATOR, RIGHT; Standing  -     Case request operating room: INSERTION THORACIC DORSAL COLUMN SPINAL CORD STIMULATOR PERCUTANEOUS W IMPLANTABLE PULSE GENERATOR, RIGHT    Lumbar radiculopathy  -     Case request operating room: INSERTION THORACIC DORSAL COLUMN SPINAL CORD STIMULATOR PERCUTANEOUS W IMPLANTABLE PULSE GENERATOR, RIGHT; Standing  -     Case request operating room: INSERTION THORACIC DORSAL COLUMN SPINAL CORD STIMULATOR PERCUTANEOUS W IMPLANTABLE PULSE GENERATOR, RIGHT    Other orders  -     Diet NPO; Sips with meds; Standing  -     Nursing Communication [facility] Interventions Implemented; Standing  -     Nursing Communication CHG bath, have staff wash entire body (neck down) per pre-op bathing protocol  Routine, evening prior to, and day of surgery ; Standing  -     Nursing Communication Swab both nares with Povidone-Iodine solution, EXCLUDE if patient has shellfish/Iodine allergy  Routine, day of surgery, on call to OR; Standing  -     chlorhexidine (PERIDEX) 0 12 % oral rinse 15 mL  -     Void on call to OR; Standing  -     Insert peripheral IV; Standing  -     ceFAZolin (ANCEF) IVPB (premix in dextrose) 1,000 mg 50 mL        I have spent 40 minutes with Patient  today in which greater than 50% of this time was spent in counseling/coordination of care regarding Diagnostic results, Prognosis, Risks and benefits of tx options, Intructions for management, Patient and family education, Importance of tx compliance, Risk factor reductions and Impressions  Subjective:      Patient ID: Kermit Shrestha is a 64 y o  male  Patient is a 64year old male with symptoms of low back and right leg pain  Pain is severe and radiating in quality  Pain distribution is low back and right leg  Pain is worse with activity  Pain is improved by rest  The pain has been present for several years  Pain has associated distress and reduced ambulation  The patient underwent a successful medtronic trial with greater than 50 % relief of pain  They reported improvement in activity  They are ready to proceed with surgery           The following portions of the patient's history were reviewed and updated as appropriate:   He  has a past medical history of Acute on chronic diastolic congestive heart failure ([address] ), Altered gait, Alzheimer disease ([address] ), Angina pectoris ([address] ), Anxiety, Arthritis, Brain aneurysm, Cardiac disease, Chest pain (1/13/2016), Chronic pain, Constipation, COPD (chronic obstructive pulmonary disease) ([facility] ), Coronary artery disease, CPAP (continuous positive airway pressure) dependence, Decubital ulcer, Dependent on walker for ambulation, Depression, Diabetes mellitus ([address] ), Dizziness, Dysphagia, Enlarged prostate, Fall, GERD (gastroesophageal reflux disease), Heart failure ([address] ), Hiatal hernia, gastric bypass (11/19/2018), Hypercholesterolemia, Hypertension, MI (myocardial infarction) ([address] ), Migraine, Morbid obesity ([address] ), Neuropathy, Oxygen dependent, Pressure injury of skin, Renal disorder, Shortness of breath, Skin abnormality, Sleep apnea, Stented coronary artery, Stroke [facility]), Urinary frequency, Use of cane as ambulatory aid, Wears dentures, Wears glasses, Wears glasses, and Wheelchair dependent    He   Patient Active Problem List    Diagnosis Date Noted    Skin inflammation 08/21/2020    Aftercare following surgery 07/23/2020    Radiculopathy, lumbosacral region 05/08/2020    Neuropathy 05/08/2020    Presence of intrathecal pump 05/08/2020    Malfunction of intrathecal infusion pump 05/08/2020    Preoperative examination 05/08/2020    Major depressive disorder, recurrent, moderate ([address] ) 01/30/2020    COPD (chronic obstructive pulmonary disease) ([facility] [address] ) 01/29/2020    Type 2 diabetes mellitus with diabetic neuropathy, with long-term current use of insulin ([facility] [address] ) 10/18/2019    Mild cognitive impairment 10/18/2019    Primary osteoarthritis of both knees 08/27/2019    Orthostatic hypotension 08/12/2019    Bilateral foot pain 08/11/2019    Symptomatic bradycardia 08/08/2019    Pressure injury of skin of sacral region 06/03/2019    Pain and swelling of left knee 05/05/2019    Fall at home 05/03/2019    Vitamin D deficiency 08/01/2018    Chronic migraine without aura without status migrainosus, not intractable 02/05/2018    Esophageal dysphagia 01/31/2018    Opioid dependence, continuous ([facility] ) 11/06/2017    GERD (gastroesophageal reflux disease)     Stage 3 chronic kidney disease 10/12/2017    Bilateral leg edema 09/05/2017    Chronic respiratory failure ([place] [address] ) 09/05/2017    Alzheimer disease (HCC)     Chronic pain disorder     Coronary artery disease     Sleep apnea     Stroke ([place] [address] )     Stented coronary artery     Obstructive sleep apnea syndrome     CPAP (continuous positive airway pressure) dependence     Brain aneurysm     Morbid obesity ([place] [address] ) 01/15/2016    CVA (cerebral vascular accident) ([place] [address] ) 01/15/2016    Coronary artery disease involving native coronary artery 01/14/2016    Type 2 diabetes mellitus with renal complication (HCC)     Chronic diastolic congestive heart failure ([place] [address] )     Hyperlipidemia 02/09/2015     He  has a past surgical history that includes Hernia repair; Knee arthroscopy (Right); Brain surgery; Back surgery; Knee arthroscopy (Right); Cerebral aneurysm repair; Peroneal nerve decompression (Right); Cardiac catheterization; Hiatal hernia repair; pr esophagogastroduodenoscopy transoral diagnostic (N/A, 2/27/2017); pr insert spine infusn device,subcut (N/A, 1/19/2017); Esophagogastroduodenoscopy (N/A, 7/1/2016); pr insert spine infusn device,subcut (N/A, 5/16/2016); Colonoscopy; pr esophagogastroduodenoscopy transoral diagnostic (N/A, 8/23/2018); Gastric bypass (11/19/2018); Infusion pump implantation (Left); pr debridement open wound 20 sq cm< (N/A, 6/6/2019); Intrathecal pump implantation (Left, 7/9/2020); and pr insert/ replace infusn pump,programmable (Left, 10/13/2020)  His family history includes Diabetes unspecified in his brother, brother, maternal grandmother, mother, paternal grandmother, and paternal uncle; Heart attack in his father  He  reports that he has never smoked  He has never used smokeless tobacco  He reports previous alcohol use  He reports previous drug use  Drug: Marijuana    Current Outpatient Medications   Medication Sig Dispense Refill    albuterol (ACCUNEB) 1 25 MG/3ML nebulizer solution Take 1 ampule by nebulization every 6 (six) hours as needed for wheezing      ARIPiprazole (ABILIFY) 5 mg tablet Take 10 mg by mouth daily       atorvastatin (LIPITOR) 40 mg tablet Take 40 mg by mouth daily   baclofen 10 mg tablet Take 10 mg by mouth 3 (three) times a day      CALCIUM PO Take 1 tablet by mouth daily      Cholecalciferol (VITAMIN D3) 5000 units CAPS Take 1 capsule (5,000 Units total) by mouth daily 30 capsule 6    clopidogrel (PLAVIX) 75 mg tablet Take 75 mg by mouth daily      Cyanocobalamin (VITAMIN B-12 PO) Take 1 tablet by mouth daily      ergocalciferol (VITAMIN D2) 50,000 units Take 50,000 Units by mouth once a week  0    fludrocortisone (FLORINEF) 0 1 mg tablet Take 1 tablet (0 1 mg total) by mouth daily  0    folic acid (FOLVITE) 1 mg tablet Take 1 mg by mouth daily   3    gabapentin (NEURONTIN) 300 mg capsule TAKE 1 CAPSULE (300 MG TOTAL) BY MOUTH AS NEEDED (MIGRAINE) 30 capsule 0    gabapentin (NEURONTIN) 600 MG tablet Take 600 mg by mouth 3 (three) times a day      hydrocortisone 1 % lotion APPLY TOPICALLY 2 (TWO) TIMES A DAY INDICATIONS  USING ON FEET   hydrOXYzine HCL (ATARAX) 25 mg tablet Take 75 mg by mouth 2 (two) times a day as needed for anxiety       memantine (NAMENDA) 10 mg tablet TAKE 1 TABLET (10 MG TOTAL) BY MOUTH 2 (TWO) TIMES A DAY   Morphine Sulfate Microinfusion (MORPHINE SULF MICROINFUSION [facility]) Inject 14 mg as directed daily Via infusion pump      nitroglycerin (NITROSTAT) 0 4 mg SL tablet Place 0 4 mg under the tongue every 5 (five) minutes as needed for chest pain (pt has not  used recently)          nystatin (MYCOSTATIN) cream Apply 1 application topically 2 (two) times a day      omeprazole (PriLOSEC) 40 MG capsule Take 40 mg by mouth daily      Pediatric Multivit-Minerals-C (Polyvitamin/Iron) CHEW Chew 1 tablet daily      POTASSIUM PO Take 40 mEq by mouth 2 (two) times a day      prochlorperazine (COMPAZINE) 10 mg tablet Take 1 tablet (10 mg total) by mouth every 6 (six) hours as needed for nausea or vomiting (migraine) 20 tablet 0    sertraline (ZOLOFT) 100 mg tablet Take 1 5 tablets (150 mg total) by mouth daily (Patient taking differently: Take 200 mg by mouth daily )  0    tamsulosin (FLOMAX) 0 4 mg Take 0 4 mg by mouth daily with dinner   traZODone (DESYREL) 100 mg tablet Take 2 tablets (200 mg total) by mouth daily at bedtime as needed for sleep (Patient taking differently: Take 300 mg by mouth daily at bedtime as needed for sleep ) 14 tablet 0    ULTICARE SHORT PEN NEEDLES 31G X 8 MM MISC 4 INJECTIONS PER DAY DX  E11 8  1     No current facility-administered medications for this visit  Current Outpatient Medications on File Prior to Visit   Medication Sig    albuterol (ACCUNEB) 1 25 MG/3ML nebulizer solution Take 1 ampule by nebulization every 6 (six) hours as needed for wheezing    ARIPiprazole (ABILIFY) 5 mg tablet Take 10 mg by mouth daily     atorvastatin (LIPITOR) 40 mg tablet Take 40 mg by mouth daily   baclofen 10 mg tablet Take 10 mg by mouth 3 (three) times a day    CALCIUM PO Take 1 tablet by mouth daily    Cholecalciferol (VITAMIN D3) 5000 units CAPS Take 1 capsule (5,000 Units total) by mouth daily    clopidogrel (PLAVIX) 75 mg tablet Take 75 mg by mouth daily    Cyanocobalamin (VITAMIN B-12 PO) Take 1 tablet by mouth daily    ergocalciferol (VITAMIN D2) 50,000 units Take 50,000 Units by mouth once a week    fludrocortisone (FLORINEF) 0 1 mg tablet Take 1 tablet (0 1 mg total) by mouth daily    folic acid (FOLVITE) 1 mg tablet Take 1 mg by mouth daily     gabapentin (NEURONTIN) 300 mg capsule TAKE 1 CAPSULE (300 MG TOTAL) BY MOUTH AS NEEDED (MIGRAINE)    gabapentin (NEURONTIN) 600 MG tablet Take 600 mg by mouth 3 (three) times a day    hydrocortisone 1 % lotion APPLY TOPICALLY 2 (TWO) TIMES A DAY INDICATIONS  USING ON FEET   hydrOXYzine HCL (ATARAX) 25 mg tablet Take 75 mg by mouth 2 (two) times a day as needed for anxiety     memantine (NAMENDA) 10 mg tablet TAKE 1 TABLET (10 MG TOTAL) BY MOUTH 2 (TWO) TIMES A DAY   Morphine Sulfate Microinfusion (MORPHINE SULF MICROINFUSION PF IJ) Inject 14 mg as directed daily Via infusion pump    nitroglycerin (NITROSTAT) 0 4 mg SL tablet Place 0 4 mg under the tongue every 5 (five) minutes as needed for chest pain (pt has not  used recently)   nystatin (MYCOSTATIN) cream Apply 1 application topically 2 (two) times a day    omeprazole (PriLOSEC) 40 MG capsule Take 40 mg by mouth daily    Pediatric Multivit-Minerals-C (Polyvitamin/Iron) CHEW Chew 1 tablet daily    POTASSIUM PO Take 40 mEq by mouth 2 (two) times a day    prochlorperazine (COMPAZINE) 10 mg tablet Take 1 tablet (10 mg total) by mouth every 6 (six) hours as needed for nausea or vomiting (migraine)    sertraline (ZOLOFT) 100 mg tablet Take 1 5 tablets (150 mg total) by mouth daily (Patient taking differently: Take 200 mg by mouth daily )    tamsulosin (FLOMAX) 0 4 mg Take 0 4 mg by mouth daily with dinner   traZODone (DESYREL) 100 mg tablet Take 2 tablets (200 mg total) by mouth daily at bedtime as needed for sleep (Patient taking differently: Take 300 mg by mouth daily at bedtime as needed for sleep )    ULTICARE SHORT PEN NEEDLES 31G X 8 MM MISC 4 INJECTIONS PER DAY DX  E11 8     No current facility-administered medications on file prior to visit  He has No Known Allergies       Review of Systems   Constitutional: Negative  HENT: Negative  Eyes: Negative for visual disturbance (No peripheral vision right and top )  Respiratory: Negative  Cardiovascular: Negative  Gastrointestinal: Positive for constipation  Endocrine: Negative  Genitourinary: Difficulty urinating: improving     Musculoskeletal: Positive for arthralgias, back pain (Low back pain into groin on the right side ) and gait problem (PAtient ambulates using a walker  )  Burning pain in bilateral buttocks as well in bi/feet  SCS trial done by Dr Lyn Dolan  Morphine pump placed   Skin: Positive for wound (surgical incisions)  Allergic/Immunologic: Negative  Neurological: Positive for numbness (Neuropathy ) and headaches  Negative for dizziness and light-headedness  Weakness: generalized  Hematological: Bruises/bleeds easily  Psychiatric/Behavioral: Negative  I have personally reviewed all aspects of the review of systems as documented    Objective:      /84 (BP Location: Right arm)   Pulse 74   Temp 98 2 °F (36 8 °C) (Tympanic)   Resp 16   Ht 6' (1 829 m)   Wt 116 kg (256 lb)   BMI 34 72 kg/m²          Physical Exam  Constitutional:       Appearance: Normal appearance  He is normal weight  HENT:      Head: Normocephalic and atraumatic  Eyes:      General:         Right eye: No discharge  Left eye: No discharge  Extraocular Movements: Extraocular movements intact  Conjunctiva/sclera: Conjunctivae normal       Pupils: Pupils are equal, round, and reactive to light  Cardiovascular:      Rate and Rhythm: Normal rate and regular rhythm  Pulmonary:      Effort: Pulmonary effort is normal  No respiratory distress  Musculoskeletal: Normal range of motion  Skin:     General: Skin is warm and dry  Neurological:      General: No focal deficit present  Mental Status: He is alert and oriented to person, place, and time  Mental status is at baseline  Psychiatric:         Mood and Affect: Mood normal          Thought Content:  Thought content normal

## 2021-03-03 ENCOUNTER — LAB (OUTPATIENT)
Dept: LAB | Facility: HOSPITAL | Age: 61
End: 2021-03-03
Attending: NEUROLOGICAL SURGERY
Payer: MEDICARE

## 2021-03-03 DIAGNOSIS — G89.4 CHRONIC PAIN SYNDROME: ICD-10-CM

## 2021-03-03 DIAGNOSIS — M54.16 LUMBAR RADICULOPATHY: ICD-10-CM

## 2021-03-03 LAB
ALBUMIN SERPL BCP-MCNC: 3.3 G/DL (ref 3.5–5)
ALP SERPL-CCNC: 67 U/L (ref 46–116)
ALT SERPL W P-5'-P-CCNC: 33 U/L (ref 12–78)
ANION GAP SERPL CALCULATED.3IONS-SCNC: 8 MMOL/L (ref 4–13)
APTT PPP: 26 SECONDS (ref 23–37)
AST SERPL W P-5'-P-CCNC: 21 U/L (ref 5–45)
BASOPHILS # BLD AUTO: 0.06 THOUSANDS/ΜL (ref 0–0.1)
BASOPHILS NFR BLD AUTO: 1 % (ref 0–1)
BILIRUB SERPL-MCNC: 0.48 MG/DL (ref 0.2–1)
BUN SERPL-MCNC: 8 MG/DL (ref 5–25)
CALCIUM ALBUM COR SERPL-MCNC: 9.7 MG/DL (ref 8.3–10.1)
CALCIUM SERPL-MCNC: 9.1 MG/DL (ref 8.3–10.1)
CHLORIDE SERPL-SCNC: 109 MMOL/L (ref 100–108)
CO2 SERPL-SCNC: 28 MMOL/L (ref 21–32)
CREAT SERPL-MCNC: 1.06 MG/DL (ref 0.6–1.3)
EOSINOPHIL # BLD AUTO: 0.34 THOUSAND/ΜL (ref 0–0.61)
EOSINOPHIL NFR BLD AUTO: 4 % (ref 0–6)
ERYTHROCYTE [DISTWIDTH] IN BLOOD BY AUTOMATED COUNT: 12 % (ref 11.6–15.1)
EST. AVERAGE GLUCOSE BLD GHB EST-MCNC: 97 MG/DL
GFR SERPL CREATININE-BSD FRML MDRD: 77 ML/MIN/1.73SQ M
GLUCOSE P FAST SERPL-MCNC: 88 MG/DL (ref 65–99)
HBA1C MFR BLD: 5 %
HCT VFR BLD AUTO: 45.2 % (ref 36.5–49.3)
HGB BLD-MCNC: 14.7 G/DL (ref 12–17)
IMM GRANULOCYTES # BLD AUTO: 0.04 THOUSAND/UL (ref 0–0.2)
IMM GRANULOCYTES NFR BLD AUTO: 0 % (ref 0–2)
INR PPP: 1.12 (ref 0.84–1.19)
LYMPHOCYTES # BLD AUTO: 2.24 THOUSANDS/ΜL (ref 0.6–4.47)
LYMPHOCYTES NFR BLD AUTO: 23 % (ref 14–44)
MCH RBC QN AUTO: 31.2 PG (ref 26.8–34.3)
MCHC RBC AUTO-ENTMCNC: 32.5 G/DL (ref 31.4–37.4)
MCV RBC AUTO: 96 FL (ref 82–98)
MONOCYTES # BLD AUTO: 0.6 THOUSAND/ΜL (ref 0.17–1.22)
MONOCYTES NFR BLD AUTO: 6 % (ref 4–12)
NEUTROPHILS # BLD AUTO: 6.56 THOUSANDS/ΜL (ref 1.85–7.62)
NEUTS SEG NFR BLD AUTO: 66 % (ref 43–75)
NRBC BLD AUTO-RTO: 0 /100 WBCS
PLATELET # BLD AUTO: 181 THOUSANDS/UL (ref 149–390)
PMV BLD AUTO: 10.3 FL (ref 8.9–12.7)
POTASSIUM SERPL-SCNC: 3.7 MMOL/L (ref 3.5–5.3)
PROT SERPL-MCNC: 6.6 G/DL (ref 6.4–8.2)
PROTHROMBIN TIME: 14.2 SECONDS (ref 11.6–14.5)
RBC # BLD AUTO: 4.71 MILLION/UL (ref 3.88–5.62)
SODIUM SERPL-SCNC: 145 MMOL/L (ref 136–145)
WBC # BLD AUTO: 9.84 THOUSAND/UL (ref 4.31–10.16)

## 2021-03-03 PROCEDURE — 83036 HEMOGLOBIN GLYCOSYLATED A1C: CPT

## 2021-03-03 PROCEDURE — 80053 COMPREHEN METABOLIC PANEL: CPT

## 2021-03-03 PROCEDURE — 85730 THROMBOPLASTIN TIME PARTIAL: CPT

## 2021-03-03 PROCEDURE — 36415 COLL VENOUS BLD VENIPUNCTURE: CPT

## 2021-03-03 PROCEDURE — 85025 COMPLETE CBC W/AUTO DIFF WBC: CPT

## 2021-03-03 PROCEDURE — 85610 PROTHROMBIN TIME: CPT

## 2021-03-10 DIAGNOSIS — Z23 ENCOUNTER FOR IMMUNIZATION: ICD-10-CM

## 2021-03-12 NOTE — PRE-PROCEDURE INSTRUCTIONS
Pre-Surgery Instructions:   Medication Instructions    albuterol (ACCUNEB) 1 25 MG/3ML nebulizer solution Continue to take these inhaler medications on your normal schedule up to and including the day of surgery   ARIPiprazole (ABILIFY) 5 mg tablet ok take morning of day of surgery    atorvastatin (LIPITOR) 40 mg tablet ok take morning of day of surgery    baclofen 10 mg tablet ok take morning of day of surgery    CALCIUM PO stop 1 week prior DOS    Cholecalciferol (VITAMIN D3) 5000 units CAPS Instructed patient per Anesthesia Guidelines   clopidogrel (PLAVIX) 75 mg tablet Patient was instructed by Physician and understands      Cyanocobalamin (VITAMIN B-12 PO) stop 1 week prior DOS    ergocalciferol (VITAMIN D2) 50,000 units stop 1 week prior DOS    fludrocortisone (FLORINEF) 0 1 mg tablet ok take morning of DOS    folic acid (FOLVITE) 1 mg tablet don't take morning of DOS    gabapentin (NEURONTIN) 300 mg capsule  may take this medicine with a sip of watermorning of DOS    gabapentin (NEURONTIN) 600 MG tablet  may take this medicine with a sip of water morning of DOS    hydrocortisone 1 % lotion don't use after skin prep (showering)    hydrOXYzine HCL (ATARAX) 25 mg tablet  may take this medicine with a sip of water if needed morning of DOS    memantine (NAMENDA) 10 mg tablet  may take this medicine with a sip of water morning of DOS    Morphine Sulfate Microinfusion (MORPHINE SULF MICROINFUSION PF IJ) continue don't hold    nitroglycerin (NITROSTAT) 0 4 mg SL tablet take as needed    nystatin (MYCOSTATIN) cream don't use after skin prep (showering)    omeprazole (PriLOSEC) 40 MG capsule  may take this medicine with a sip of water morning of DOS    Pediatric Multivit-Minerals-C (Polyvitamin/Iron) CHEW stop week of DOS    POTASSIUM PO don't take morning of DOS    prochlorperazine (COMPAZINE) 10 mg tablet  may take this medicine with a sip of waterif needed    sertraline (ZOLOFT) 100 mg tablet  may take this medicine with a sip of water DOS    tamsulosin (FLOMAX) 0 4 mg  may take this medicine with a sip of water If take in am    traZODone (DESYREL) 100 mg tablet Ok take nite prior DOS   Have you had / have a sore throat? No  have you had / have a cough less than 1 week? No  Have you had / have a fever greater than 100 0 - 100  4? No  Are you experiencing any shortness of breath? No    Review with patient via phone medications and showering instructions  Advised don't take NSAID's, ok tylenol products  Advised ASC call with surgery  Nothing eat of drink after MN  Verbalized understanding  AcetylCholinesterase inhibitors Med Class  Continue to take this medication on your normal schedule  If this is an oral medication and you take it in the morning, then you may take this medicine with a sip of water  Alpha adrenergic antagonist Med Class  Continue to take this medication on your normal schedule  If this is an oral medication and you take it in the morning, then you may take this medicine with a sip of water  Alpha-1 adrenergic blocker Med Class  Continue to take this medication on your normal schedule  If this is an oral medication and you take it in the morning, then you may take this medicine with a sip of water  Antidepressant Med Class  Continue to take this medication on your normal schedule  If this is an oral medication and you take it in the morning, then you may take this medicine with a sip of water  Antiepileptic Med Class  Continue to take this medication on your normal schedule  If this is an oral medication and you take it in the morning, then you may take this medicine with a sip of water  Antipsychotic Med Class  Continue to take this medication on your normal schedule  If this is an oral medication and you take it in the morning, then you may take this medicine with a sip of water      Inhalational Med Class  Continue to take these inhaler medications on your normal schedule up to and including the day of surgery  Nitroglycerin Med Class  Continue to take your nitroglyerin as directed by your prescribing Physician and notify your Physician and Anesthesiologist if you have needed to increase the frequency or dosage  Opioid Med Class  Continue to take this medication on your normal schedule  If this is an oral medication and you take it in the morning, then you may take this medicine with a sip of water  Platelet Aggregation Inhibitor Clopidogrel (Plavix) Med Class  Should be discontinued at least one week prior to planned operation, unless specifically stated otherwise by surgical service  [Patients taking Plavix for coronary stents should be reviewed by an anesthesiologist in the optimization clinic  Please do not discontinue Plavix in patients with coronary stents unless given specific permission to do so by the cardiologist who prescribed medication ] If your surgeon approves please continue to take this medication on your normal schedule  You may take this medication on the morning of your surgery with a sip of water  Statin Med Class  Continue to take this medication on your normal schedule  If this is an oral medication and you take it in the morning, then you may take this medicine with a sip of water  Steroids Med Class  Continue to take this medication on your normal schedule  If this is an oral medication and you take it in the morning, then you may take this medicine with a sip of water  Vitamin Med Class  You may continue to take any vitamin that your surgeon has prescribed to you up to the day before surgery   If your surgeon has not specifically prescribed this vitamin or instructed you to continue then stop taking 7 days prior to surgery

## 2021-03-16 ENCOUNTER — DOCUMENTATION (OUTPATIENT)
Dept: NEUROSURGERY | Facility: CLINIC | Age: 61
End: 2021-03-16

## 2021-03-16 ENCOUNTER — ANESTHESIA EVENT (OUTPATIENT)
Dept: PERIOP | Facility: HOSPITAL | Age: 61
End: 2021-03-16
Payer: MEDICARE

## 2021-03-16 DIAGNOSIS — Z98.890 STATUS POST SURGERY: Primary | ICD-10-CM

## 2021-03-16 RX ORDER — CEPHALEXIN 500 MG/1
500 CAPSULE ORAL EVERY 6 HOURS SCHEDULED
Qty: 12 CAPSULE | Refills: 0 | Status: SHIPPED | OUTPATIENT
Start: 2021-03-16 | End: 2021-03-19

## 2021-03-16 RX ORDER — OXYCODONE HYDROCHLORIDE AND ACETAMINOPHEN 5; 325 MG/1; MG/1
1 TABLET ORAL EVERY 4 HOURS PRN
Qty: 30 TABLET | Refills: 0 | Status: SHIPPED | OUTPATIENT
Start: 2021-03-16

## 2021-03-16 RX ORDER — METHOCARBAMOL 750 MG/1
750 TABLET, FILM COATED ORAL EVERY 6 HOURS PRN
Qty: 28 TABLET | Refills: 0 | Status: SHIPPED | OUTPATIENT
Start: 2021-03-16

## 2021-03-16 NOTE — ANESTHESIA PREPROCEDURE EVALUATION
Procedure:  INSERTION THORACIC DORSAL COLUMN SPINAL CORD STIMULATOR PERCUTANEOUS W IMPLANTABLE PULSE GENERATOR, RIGHT (Right Spine Thoracic)    Relevant Problems   CARDIO   (+) Chronic diastolic congestive heart failure (HCC)   (+) Coronary artery disease   (+) Coronary artery disease involving native coronary artery   (+) Hyperlipidemia      ENDO   (+) Type 2 diabetes mellitus with diabetic neuropathy, with long-term current use of insulin (HCC)   (+) Type 2 diabetes mellitus with renal complication (HCC)      GI/HEPATIC   (+) Esophageal dysphagia   (+) GERD (gastroesophageal reflux disease)      /RENAL   (+) Stage 3 chronic kidney disease      MUSCULOSKELETAL   (+) Primary osteoarthritis of both knees      NEURO/PSYCH   (+) Alzheimer disease (HCC)   (+) CVA (cerebral vascular accident) ([facility] [address] )   (+) Chronic pain disorder   (+) Major depressive disorder, recurrent, moderate (HCC)   (+) Opioid dependence, continuous (HCC)   (+) Stroke (HCC)      PULMONARY   (+) COPD (chronic obstructive pulmonary disease) (HCC)   (+) Chronic respiratory failure (HCC)   (+) Obstructive sleep apnea syndrome   (+) Sleep apnea   NPO verified; Denies GERD   Patient denies chest pain, YAO, Asthma,   COPD not on O2   Intrathecal pump in situ     3/17/21  Sinus rhythm with 1st degree A-V block  Left axis deviation  Right bundle branch block  Inferior infarct (cited on or before 29-JAN-2020)  Abnormal ECG  When compared with ECG of 29-JAN-2020 15:48,  Questionable change in initial forces of Inferior leads  Confirmed by Gurdeep Schwab ([id number]) on 3/17/2021 7:54:32 AM    TTE 8/11/19   SUMMARY     LEFT VENTRICLE:  Normal left ventricular systolic function, EF 96%  Normal left ventricular cavity size  Moderate concentric left ventricular hypertrophy  Normal left ventricular wall motion without regional wall motion abnormalities  Normal left  ventricular diastolic function   Normal left atrial pressures      LEFT ATRIUM:  Mild left atrial enlargement      RIGHT ATRIUM:  Mild right atrial enlargement  Left atrium larger than right atrium      AORTIC VALVE:  The aortic valve is tricuspid  The non coronary cusp is heavily calcified and the left coronary cusp is mild-to-moderately calcified  All 3 leaflets have good excursion  There is no aortic stenosis or regurgitation      TRICUSPID VALVE:  There was trace regurgitation  Physical Exam    Airway    Mallampati score: III  TM Distance: >3 FB  Neck ROM: full     Dental   upper dentures and lower dentures,     Cardiovascular      Pulmonary      Other Findings        Anesthesia Plan  ASA Score- 3     Anesthesia Type- general with ASA Monitors  Additional Monitors:   Airway Plan: ETT  Comment: Patient seen and evaluated  Plan Factors-Exercise tolerance (METS): >4 METS  Chart reviewed  EKG reviewed  Imaging results reviewed  Existing labs reviewed  Patient summary reviewed  Induction- intravenous  Postoperative Plan- Plan for postoperative opioid use  Planned trial extubation    Informed Consent- Anesthetic plan and risks discussed with patient  I personally reviewed this patient with the CRNA  Discussed and agreed on the Anesthesia Plan with the CRNA  Pat Winchester

## 2021-03-16 NOTE — TELEPHONE ENCOUNTER
Called Rikki Franco, who is scheduled for surgery tomorrow, 03/17/21   Verified allergies- No Known Allergies and went over pre-op instructions, including bathing and NPO status  Patient currently denies taking any blood thinning medications  Informed patient that the following medications will be e-scribed to his pharmacy on file, which I confirmed with the patient: Keflex- awaiting response from Dr Fifi Vides regarding pain med/antispasmodic b/c patient has pain pump  He informed me that his pain mgmt doctor, Dr Sukumar Schrader, has not said anything to him regarding managing post-operative pain  Per Dr Fifi Vides, patient will need antispasmodic and pain medication so robaxin and percocet were e-scribed to the pharmacy along with the keflex  Called patient and made him aware of change  He was appreciative  Answered any questions he may have at this time  Patient was appreciative for the call

## 2021-03-17 ENCOUNTER — HOSPITAL ENCOUNTER (OUTPATIENT)
Facility: HOSPITAL | Age: 61
Setting detail: OUTPATIENT SURGERY
Discharge: HOME | End: 2021-03-17
Attending: NEUROLOGICAL SURGERY | Admitting: NEUROLOGICAL SURGERY
Payer: MEDICARE

## 2021-03-17 ENCOUNTER — APPOINTMENT (OUTPATIENT)
Dept: RADIOLOGY | Facility: HOSPITAL | Age: 61
End: 2021-03-17
Payer: MEDICARE

## 2021-03-17 ENCOUNTER — ANESTHESIA (OUTPATIENT)
Dept: PERIOP | Facility: HOSPITAL | Age: 61
End: 2021-03-17
Payer: MEDICARE

## 2021-03-17 VITALS
OXYGEN SATURATION: 95 % | HEART RATE: 67 BPM | DIASTOLIC BLOOD PRESSURE: 66 MMHG | WEIGHT: 256 LBS | RESPIRATION RATE: 20 BRPM | SYSTOLIC BLOOD PRESSURE: 134 MMHG | TEMPERATURE: 97.1 F | BODY MASS INDEX: 34.67 KG/M2 | HEIGHT: 72 IN

## 2021-03-17 LAB
ATRIAL RATE: 61 BPM
GLUCOSE SERPL-MCNC: 81 MG/DL (ref 65–140)
GLUCOSE SERPL-MCNC: 81 MG/DL (ref 65–140)
GLUCOSE SERPL-MCNC: 93 MG/DL (ref 65–140)
P AXIS: 13 DEGREES
PR INTERVAL: 222 MS
QRS AXIS: -82 DEGREES
QRSD INTERVAL: 134 MS
QT INTERVAL: 418 MS
QTC INTERVAL: 420 MS
T WAVE AXIS: 30 DEGREES
VENTRICULAR RATE: 61 BPM

## 2021-03-17 PROCEDURE — 93005 ELECTROCARDIOGRAM TRACING: CPT

## 2021-03-17 PROCEDURE — C1787 PATIENT PROGR, NEUROSTIM: HCPCS | Performed by: NEUROLOGICAL SURGERY

## 2021-03-17 PROCEDURE — C1778 LEAD, NEUROSTIMULATOR: HCPCS | Performed by: NEUROLOGICAL SURGERY

## 2021-03-17 PROCEDURE — C1820 GENERATOR NEURO RECHG BAT SY: HCPCS | Performed by: NEUROLOGICAL SURGERY

## 2021-03-17 PROCEDURE — 82948 REAGENT STRIP/BLOOD GLUCOSE: CPT

## 2021-03-17 PROCEDURE — 93010 ELECTROCARDIOGRAM REPORT: CPT | Performed by: INTERNAL MEDICINE

## 2021-03-17 PROCEDURE — 95972 ALYS CPLX SP/PN NPGT W/PRGRM: CPT | Performed by: NEUROLOGICAL SURGERY

## 2021-03-17 PROCEDURE — 72070 X-RAY EXAM THORAC SPINE 2VWS: CPT

## 2021-03-17 PROCEDURE — 63685 INS/RPLC SPI NPG/RCVR POCKET: CPT | Performed by: NEUROLOGICAL SURGERY

## 2021-03-17 PROCEDURE — 63650 IMPLANT NEUROELECTRODES: CPT | Performed by: NEUROLOGICAL SURGERY

## 2021-03-17 DEVICE — LEAD KIT 8 CONTACT SCS MRI: Type: IMPLANTABLE DEVICE | Site: SPINE THORACIC | Status: FUNCTIONAL

## 2021-03-17 DEVICE — NEUROSTIM INTELLIS SURESCAN MRI W/ ADAPTIVESTIM: Type: IMPLANTABLE DEVICE | Site: BUTTOCKS | Status: FUNCTIONAL

## 2021-03-17 RX ORDER — PROPOFOL 10 MG/ML
INJECTION, EMULSION INTRAVENOUS CONTINUOUS PRN
Status: DISCONTINUED | OUTPATIENT
Start: 2021-03-17 | End: 2021-03-17

## 2021-03-17 RX ORDER — CEFAZOLIN SODIUM 1 G/50ML
1000 SOLUTION INTRAVENOUS ONCE
Status: DISCONTINUED | OUTPATIENT
Start: 2021-03-17 | End: 2021-03-17 | Stop reason: HOSPADM

## 2021-03-17 RX ORDER — EPHEDRINE SULFATE 50 MG/ML
INJECTION INTRAVENOUS AS NEEDED
Status: DISCONTINUED | OUTPATIENT
Start: 2021-03-17 | End: 2021-03-17

## 2021-03-17 RX ORDER — ONDANSETRON 2 MG/ML
INJECTION INTRAMUSCULAR; INTRAVENOUS AS NEEDED
Status: DISCONTINUED | OUTPATIENT
Start: 2021-03-17 | End: 2021-03-17

## 2021-03-17 RX ORDER — SODIUM CHLORIDE, SODIUM LACTATE, POTASSIUM CHLORIDE, CALCIUM CHLORIDE 600; 310; 30; 20 MG/100ML; MG/100ML; MG/100ML; MG/100ML
INJECTION, SOLUTION INTRAVENOUS CONTINUOUS PRN
Status: DISCONTINUED | OUTPATIENT
Start: 2021-03-17 | End: 2021-03-17

## 2021-03-17 RX ORDER — PROPOFOL 10 MG/ML
INJECTION, EMULSION INTRAVENOUS AS NEEDED
Status: DISCONTINUED | OUTPATIENT
Start: 2021-03-17 | End: 2021-03-17

## 2021-03-17 RX ORDER — CEFAZOLIN SODIUM 2 G/50ML
SOLUTION INTRAVENOUS AS NEEDED
Status: DISCONTINUED | OUTPATIENT
Start: 2021-03-17 | End: 2021-03-17

## 2021-03-17 RX ORDER — MIDAZOLAM HYDROCHLORIDE 2 MG/2ML
INJECTION, SOLUTION INTRAMUSCULAR; INTRAVENOUS AS NEEDED
Status: DISCONTINUED | OUTPATIENT
Start: 2021-03-17 | End: 2021-03-17

## 2021-03-17 RX ORDER — KETAMINE HYDROCHLORIDE 50 MG/ML
INJECTION, SOLUTION, CONCENTRATE INTRAMUSCULAR; INTRAVENOUS AS NEEDED
Status: DISCONTINUED | OUTPATIENT
Start: 2021-03-17 | End: 2021-03-17

## 2021-03-17 RX ORDER — CHLORHEXIDINE GLUCONATE 0.12 MG/ML
15 RINSE ORAL ONCE
Status: COMPLETED | OUTPATIENT
Start: 2021-03-17 | End: 2021-03-17

## 2021-03-17 RX ORDER — FENTANYL CITRATE/PF 50 MCG/ML
25 SYRINGE (ML) INJECTION
Status: COMPLETED | OUTPATIENT
Start: 2021-03-17 | End: 2021-03-17

## 2021-03-17 RX ORDER — FENTANYL CITRATE 50 UG/ML
INJECTION, SOLUTION INTRAMUSCULAR; INTRAVENOUS AS NEEDED
Status: DISCONTINUED | OUTPATIENT
Start: 2021-03-17 | End: 2021-03-17

## 2021-03-17 RX ORDER — LIDOCAINE HYDROCHLORIDE AND EPINEPHRINE 10; 10 MG/ML; UG/ML
INJECTION, SOLUTION INFILTRATION; PERINEURAL AS NEEDED
Status: DISCONTINUED | OUTPATIENT
Start: 2021-03-17 | End: 2021-03-17 | Stop reason: HOSPADM

## 2021-03-17 RX ORDER — METOCLOPRAMIDE HYDROCHLORIDE 5 MG/ML
10 INJECTION INTRAMUSCULAR; INTRAVENOUS ONCE AS NEEDED
Status: DISCONTINUED | OUTPATIENT
Start: 2021-03-17 | End: 2021-03-17 | Stop reason: HOSPADM

## 2021-03-17 RX ORDER — ONDANSETRON 2 MG/ML
4 INJECTION INTRAMUSCULAR; INTRAVENOUS ONCE AS NEEDED
Status: DISCONTINUED | OUTPATIENT
Start: 2021-03-17 | End: 2021-03-17 | Stop reason: HOSPADM

## 2021-03-17 RX ADMIN — Medication 25 MCG: at 10:47

## 2021-03-17 RX ADMIN — PROPOFOL 50 MG: 10 INJECTION, EMULSION INTRAVENOUS at 11:00

## 2021-03-17 RX ADMIN — FENTANYL CITRATE 200 MCG: 50 INJECTION INTRAMUSCULAR; INTRAVENOUS at 08:27

## 2021-03-17 RX ADMIN — CHLORHEXIDINE GLUCONATE 0.12% ORAL RINSE 15 ML: 1.2 LIQUID ORAL at 06:46

## 2021-03-17 RX ADMIN — CEFAZOLIN SODIUM 2000 MG: 2 SOLUTION INTRAVENOUS at 08:31

## 2021-03-17 RX ADMIN — EPHEDRINE SULFATE 10 MG: 50 INJECTION, SOLUTION INTRAVENOUS at 08:40

## 2021-03-17 RX ADMIN — Medication 25 MCG: at 10:36

## 2021-03-17 RX ADMIN — ONDANSETRON 4 MG: 2 INJECTION INTRAMUSCULAR; INTRAVENOUS at 09:29

## 2021-03-17 RX ADMIN — SODIUM CHLORIDE, SODIUM LACTATE, POTASSIUM CHLORIDE, AND CALCIUM CHLORIDE: .6; .31; .03; .02 INJECTION, SOLUTION INTRAVENOUS at 08:21

## 2021-03-17 RX ADMIN — PHENYLEPHRINE HYDROCHLORIDE 50 MCG/MIN: 10 INJECTION INTRAVENOUS at 08:31

## 2021-03-17 RX ADMIN — MIDAZOLAM 2 MG: 1 INJECTION INTRAMUSCULAR; INTRAVENOUS at 08:16

## 2021-03-17 RX ADMIN — SODIUM CHLORIDE 0.2 MCG/KG/HR: 9 INJECTION, SOLUTION INTRAVENOUS at 09:55

## 2021-03-17 RX ADMIN — EPHEDRINE SULFATE 10 MG: 50 INJECTION, SOLUTION INTRAVENOUS at 11:22

## 2021-03-17 RX ADMIN — FENTANYL CITRATE 50 MCG: 50 INJECTION INTRAMUSCULAR; INTRAVENOUS at 09:09

## 2021-03-17 RX ADMIN — PHENYLEPHRINE HYDROCHLORIDE 100 MCG: 10 INJECTION INTRAVENOUS at 08:27

## 2021-03-17 RX ADMIN — MIDAZOLAM 1 MG: 1 INJECTION INTRAMUSCULAR; INTRAVENOUS at 11:00

## 2021-03-17 RX ADMIN — PROPOFOL 100 MCG/KG/MIN: 10 INJECTION, EMULSION INTRAVENOUS at 08:32

## 2021-03-17 RX ADMIN — PROPOFOL 100 MG: 10 INJECTION, EMULSION INTRAVENOUS at 08:27

## 2021-03-17 RX ADMIN — KETAMINE HYDROCHLORIDE 50 MG: 50 INJECTION, SOLUTION INTRAMUSCULAR; INTRAVENOUS at 08:27

## 2021-03-17 RX ADMIN — Medication 25 MCG: at 10:41

## 2021-03-17 RX ADMIN — Medication 25 MCG: at 10:31

## 2021-03-17 NOTE — INTERVAL H&P NOTE
H&P reviewed  After examining the patient I find no changes in the patients condition since the H&P had been written      Vitals:    03/17/21 0659   BP: 134/70   Pulse: 68   Resp: 16   Temp: 98 6 °F (37 °C)   SpO2: 95%

## 2021-03-17 NOTE — DISCHARGE INSTRUCTIONS
Discharge Instructions  Spinal Cord Stimulator (SCS)    Activity:  1  Do not lift more than 10 pounds for 6 weeks  2  Avoid bending, lifting and twisting for 6 weeks  3  No strenuous activities  No driving for 2 weeks  4  When able to shower, continue to use clean towel and washcloth for 2 weeks post-op  5  Continue to change bed linens and pajamas more frequently  Wear clean clothes daily  Surgical incision care:  1  For Permanent SCS placement:  a  Keep incisions dry for 3 days  b  May shower with mild antimicrobial soap after 3 days  c  After 3 days, incisions may be left open to air, but must remain clean  2  For Trial SCS placement:  a  Wires and stimulator will be secured with a dressing  Do NOT remove  Keep dressing DRY  b  No showering with Trial in place  Sponge bath only  3  Do not immerse the incisions in water for 6 weeks  4  Do not apply any creams or ointments to the incision for 6 weeks, unless otherwise instructed by [facility]  5  Contact office if increasing redness, drainage, pain or swelling around the incisions  Postoperative medication:  1  Complete course of antibiotic as directed  a  For permanent spinal cord stimulators: 3 days  b  For spinal cord stimulator trials: Continue for duration of trial and 3 days after leads are pulled unless directed otherwise  2  [facility] will provide pain medication as coordinated with your pain specialist  All prescriptions must come from a single provider  a  Take all medications as prescribed  Call office with any questions/concerns  3  Please contact office for questions regarding dosage and modifications  4  No antiplatelet, anticoagulation or Nonsteroidal anti-inflammatory (NSAIDs) medication until cleared by [address], unless otherwise instructed  5  Do not operate heavy machinery or vehicles while taking sedating medications    6  Use a bowel regimen while on opioids as they induce constipation  Ie  Senokot-S, Miralax, Colace, etc  Increase fiber and water intake  ***Note that it may take some time for the stimulator to improve chronic pain symptoms  Adjustment of the stimulator program can begin 2 weeks after placement  For TRIALS, there will be ongoing communication with the rep and adjustments as indicated  Please contact the stimulator representative if you have any questions regarding programing  ***    ***PLEASE CONTACT YOUR REP PRIOR TO YOUR 2 WEEK POSTOPERATIVE VISIT TO PROVIDE THEM WITH THE DATE AND TIME OF YOUR APPOINTMENT***

## 2021-03-17 NOTE — ANESTHESIA POSTPROCEDURE EVALUATION
Post-Op Assessment Note    CV Status:  Stable  Pain Score: 2    Pain management: adequate     Mental Status:  Sleepy   Hydration Status:  Stable   PONV Controlled:  None   Airway Patency:  Patent      Post Op Vitals Reviewed: Yes      Staff: Anesthesiologist         No complications documented      BP   135/69   Temp   98 4   Pulse 72   Resp   10   SpO2 99

## 2021-03-17 NOTE — OP NOTE
OPERATIVE REPORT  PATIENT NAME: Gilmar Luu    :  1960  MRN: [id number]  Pt Location: [facility] OR ROOM 17    SURGERY DATE: 3/17/2021    Surgeon(s) and Role:     * Jenifer Valencia MD - Primary     * Tamera Young PA-C - Assisting    Preop Diagnosis:  Chronic pain syndrome [G89 4]  Lumbar radiculopathy [M54 16]    Post-Op Diagnosis Codes:     * Chronic pain syndrome [G89 4]     * Lumbar radiculopathy [M54 16]    Procedure(s) (LRB):  INSERTION THORACIC DORSAL COLUMN SPINAL CORD STIMULATOR PERCUTANEOUS W IMPLANTABLE PULSE GENERATOR, RIGHT (Right)    Specimen(s):  * No specimens in log *    Estimated Blood Loss:   Minimal    Drains:  * No LDAs found *    Anesthesia Type:   General    Operative Indications:  Chronic pain syndrome [G89 4]  Lumbar radiculopathy [M54 16]      Operative Findings:  See dictated note  Pyreos  SN: [id number]  [id number] x2    Complications:   None    Procedure and Technique:  The patient was taken to operative theater induced under general anesthesia and intubated he was positioned prone a Jordin frame  The sweet spot during the trial was planned at T8-9  A right flank incision was planned for the generator he was prepped and draped in sterile fashion  We made an incision with a 10 blade and performed electrocautery   We dissected down to fascia  We used an epidmed needle to gain epidural access via loss of resistance technique  We traveled two electrodes through two different epidural access needles up to towards approximately T8 and T9  We confirmed placement of the the lead based on fluoro  We tested the EMG response by altering the following parameters using a neurostimulation device   We programmed the following:    Frequency: 10-60 hertz   Pulse width of 350 us  Amplitudes: 0-10 mA   Contacts: midline contacts alternating contacts on the 16 contact electrode  Configuration: Bipolar with (+) and (-)  Cycling: None  Waveform: Tonic     We obtained EMG responses in the abdominals but not the legs but we did confirm midline placement relatively  The pocket on the right flank was created with an knife and electrocautery  We tunneled that electrodes connected to the impulse generator and this was connected  We then tested impedances which were normal     The leads been anchored down using anchors under serial of fluoroscopy  After all hardware was in place we irrigated each wound  And then closed in layers using 2 0 Vicryl for the subcutaneous tissues and monocryl for the skin sterile dressing was placed  Patient was repositioned supine the hospital bed extubated to room air  he was taken to the postop recovery area stable condition  All needle and sponge counts were correct at the procedure  All neuromonitoring remained stable during the procedure       I was present for the entire procedure, A qualified resident physician was not available and A physician assistant was required during the procedure for retraction tissue handling,dissection and suturing    Patient Disposition:  PACU     SIGNATURE: Sharlene Lowe MD  DATE: March 17, 2021  TIME: 10:11 AM

## 2021-03-18 ENCOUNTER — TELEMEDICINE (OUTPATIENT)
Dept: NEUROSURGERY | Facility: CLINIC | Age: 61
End: 2021-03-18

## 2021-03-18 DIAGNOSIS — Z98.890 POST-OPERATIVE STATE: Primary | ICD-10-CM

## 2021-03-18 PROCEDURE — 99024 POSTOP FOLLOW-UP VISIT: CPT

## 2021-03-18 NOTE — PROGRESS NOTES
Virtual Brief Visit    Assessment/Plan:    Problem List Items Addressed This Visit     None                Reason for visit is postop call  Chief Complaint   Patient presents with    Post-op    Virtual Brief Visit        Encounter provider Neurosurgery Nurse Wilfredo    Provider located at [address] [place]  [address] [facility] [address] [phone]    Recent Visits  No visits were found meeting these conditions  Showing recent visits within past 7 days and meeting all other requirements     Today's Visits  Date Type Provider Dept   03/18/21 Telemedicine NEUROSURGERY NURSE [address] today's visits and meeting all other requirements     Future Appointments  No visits were found meeting these conditions  Showing future appointments within next 150 days and meeting all other requirements        After connecting through telephone, the patient was identified by name and date of birth  Danisha Dickinson was informed that this is a telemedicine visit and that the visit is being conducted through telephone  My office door was closed  No one else was in the room  He acknowledged consent and understanding of privacy and security of the platform  The patient has agreed to participate and understands he can discontinue the visit at any time  Subjective    Danisha Dickinson is a 64 y o  male INSERTION THORACIC DORSAL COLUMN SPINAL CORD STIMULATOR PERCUTANEOUS W IMPLANTABLE PULSE GENERATOR, RIGHT (Right Spine Thoracic)        Past Medical History:   Diagnosis Date    Acute on chronic diastolic congestive heart failure (HCC)     Altered gait     Alzheimer disease ([address] )     per patients,,early onset     Angina pectoris ([address] )     Anxiety     Arthritis     Brain aneurysm     coils placed    Cardiac disease     Chest pain 1/13/2016    Chronic kidney disease     Chronic pain     back/ right groin and rle- has morphine pump    Constipation     COPD (chronic obstructive pulmonary disease) (HCC)     Coronary artery disease     CPAP (continuous positive airway pressure) dependence     Decubital ulcer     sacral decub-occured 5/2019-sees wound care/debide in OR today 6/6/2019    Dependent on walker for ambulation     w/c for long distance    Depression     Diabetes mellitus (HCC)     insulin dependent    Dizziness     occ    Dysphagia     Enlarged prostate     Fall     GERD (gastroesophageal reflux disease)     Heart failure ([facility] [address] )     Hiatal hernia     Hx of gastric bypass 11/19/2018    Hypercholesterolemia     Hypertension     MI (myocardial infarction) ([facility] [address] )     2017- stents x2    Migraine     Morbid obesity ([facility] [address] )     gastric bypass sleeve 11/2018-wt loss 125 lb    Neuropathy     Oxygen dependent     Q HS  2LPM with CPAP and prn during day 2-3 LPM     Pressure injury of skin     Renal disorder     Shortness of breath     Skin abnormality     sacral wound - covered with pad    Sleep apnea     Stented coronary artery     Stroke ([facility] [address] )     vision loss b/l  2005, residual R leg weakness    Urinary frequency     Use of cane as ambulatory aid     Wears dentures     Wears glasses     Wears glasses     Wheelchair dependent        Past Surgical History:   Procedure Laterality Date    BACK SURGERY      BRAIN SURGERY      CARDIAC CATHETERIZATION      with stents    CEREBRAL ANEURYSM REPAIR      with coils    COLONOSCOPY      ESOPHAGOGASTRODUODENOSCOPY N/A 7/1/2016    Procedure: ESOPHAGOGASTRODUODENOSCOPY (EGD); Surgeon: Adam Menon MD;  Location: [facility];   Service:     GASTRIC BYPASS  11/19/2018    HERNIA REPAIR      HIATAL HERNIA REPAIR      INFUSION PUMP IMPLANTATION Left     morphine    INTRATHECAL PUMP IMPLANTATION Left 7/9/2020    Procedure: REVISION INTRATHECAL PAIN PUMP POCKET, LEFT ABDOMEN;  Surgeon: Sharlene Lowe MD;  Location: [facility];  Service: Neurosurgery  KNEE ARTHROSCOPY Right     KNEE ARTHROSCOPY Right     PERONEAL NERVE DECOMPRESSION Right     OK DEBRIDEMENT OPEN WOUND 20 SQ CM< N/A 6/6/2019    Procedure: EXCISIONAL DEBRIDEMENT OF SACRAL DECUBITUS ULCER;  Surgeon: Camelia Dela Cruz MD;  [facility];  Service: General    OK ESOPHAGOGASTRODUODENOSCOPY TRANSORAL DIAGNOSTIC N/A 2/27/2017    Procedure: ESOPHAGOGASTRODUODENOSCOPY (EGD); Surgeon: Kermit Brewer MD;  Location: [facility]; Service: Gastroenterology    OK ESOPHAGOGASTRODUODENOSCOPY TRANSORAL DIAGNOSTIC N/A 8/23/2018    Procedure: ESOPHAGOGASTRODUODENOSCOPY (EGD) with biopsy;  Surgeon: Kermit Brewer MD;  [facility]; Service: Gastroenterology    OK INSERT SPINE INFUSN [address] [place] [address] N/A 1/19/2017    Procedure: REMOVAL / EXCHANGE INTRATHECAL PAIN PUMP;  Surgeon: Lukasz Morales MD;  [facility];  Service: Orthopedics    OK INSERT SPINE INFUSN [address] [place] [address] N/A 5/16/2016    Procedure: REPLACEMENT AND PROGRAM PUMP ;  Surgeon: Lukasz Morales MD;  [facility];  Service: Orthopedics    OK INSERT/ REPLACE INFUSN PUMP,PROGRAMMABLE Left 10/13/2020    Procedure: EXPLORATION OF INTRATHECAL PAIN PUMP SYSTEM INTEGRITY FOR POSSIBLE REPLACEMENT OF CATHETER AND PUMP ;  Surgeon: Mouna Monge MD;  Location:  MAIN OR;  Service: Neurosurgery    OK PERCUT IMPLNT [address] Right 3/17/2021    Procedure: INSERTION THORACIC DORSAL COLUMN SPINAL CORD STIMULATOR PERCUTANEOUS W IMPLANTABLE PULSE GENERATOR, RIGHT;  Surgeon: Mouna Monge MD;  [facility]: [facility];  Service: Neurosurgery       Current Outpatient Medications   Medication Sig Dispense Refill    albuterol (ACCUNEB) 1 25 MG/3ML nebulizer solution Take 1 ampule by nebulization every 6 (six) hours as needed for wheezing      ARIPiprazole (ABILIFY) 5 mg tablet Take 10 mg by mouth daily       atorvastatin (LIPITOR) 40 mg tablet Take 40 mg by mouth daily        baclofen 10 mg tablet Take 10 mg by mouth 3 (three) times a day  CALCIUM PO Take 1 tablet by mouth daily      cephalexin (KEFLEX) 500 mg capsule Take 1 capsule (500 mg total) by mouth every 6 (six) hours for 3 days Start the day of surgery once you get home  12 capsule 0    Cholecalciferol (VITAMIN D3) 5000 units CAPS Take 1 capsule (5,000 Units total) by mouth daily 30 capsule 6    clopidogrel (PLAVIX) 75 mg tablet Take 75 mg by mouth daily      Cyanocobalamin (VITAMIN B-12 PO) Take 1 tablet by mouth daily      ergocalciferol (VITAMIN D2) 50,000 units Take 50,000 Units by mouth once a week  0    fludrocortisone (FLORINEF) 0 1 mg tablet Take 1 tablet (0 1 mg total) by mouth daily  0    folic acid (FOLVITE) 1 mg tablet Take 1 mg by mouth daily   3    gabapentin (NEURONTIN) 300 mg capsule TAKE 1 CAPSULE (300 MG TOTAL) BY MOUTH AS NEEDED (MIGRAINE) 30 capsule 0    gabapentin (NEURONTIN) 600 MG tablet Take 600 mg by mouth 3 (three) times a day      hydrocortisone 1 % lotion APPLY TOPICALLY 2 (TWO) TIMES A DAY INDICATIONS  USING ON FEET   hydrOXYzine HCL (ATARAX) 25 mg tablet Take 75 mg by mouth 2 (two) times a day as needed for anxiety       memantine (NAMENDA) 10 mg tablet TAKE 1 TABLET (10 MG TOTAL) BY MOUTH 2 (TWO) TIMES A DAY   methocarbamol (ROBAXIN) 750 mg tablet Take 1 tablet (750 mg total) by mouth every 6 (six) hours as needed for muscle spasms 28 tablet 0    Morphine Sulfate Microinfusion (MORPHINE SULF MICROINFUSION PF IJ) Inject 14 mg as directed daily Via infusion pump      nitroglycerin (NITROSTAT) 0 4 mg SL tablet Place 0 4 mg under the tongue every 5 (five) minutes as needed for chest pain (pt has not  used recently)          nystatin (MYCOSTATIN) cream Apply 1 application topically 2 (two) times a day      omeprazole (PriLOSEC) 40 MG capsule Take 40 mg by mouth daily      oxyCODONE-acetaminophen (PERCOCET) 5-325 mg per tablet Take 1 tablet by mouth every 4 (four) hours as needed for severe painMax Daily Amount: 6 tablets 30 tablet 0    Pediatric Multivit-Minerals-C (Polyvitamin/Iron) CHEW Chew 1 tablet daily      POTASSIUM PO Take 40 mEq by mouth 2 (two) times a day      prochlorperazine (COMPAZINE) 10 mg tablet Take 1 tablet (10 mg total) by mouth every 6 (six) hours as needed for nausea or vomiting (migraine) 20 tablet 0    sertraline (ZOLOFT) 100 mg tablet Take 1 5 tablets (150 mg total) by mouth daily (Patient taking differently: Take 200 mg by mouth daily )  0    tamsulosin (FLOMAX) 0 4 mg Take 0 4 mg by mouth daily with dinner   traZODone (DESYREL) 100 mg tablet Take 2 tablets (200 mg total) by mouth daily at bedtime as needed for sleep (Patient taking differently: Take 300 mg by mouth daily at bedtime as needed for sleep ) 14 tablet 0    ULTICARE SHORT PEN NEEDLES 31G X 8 MM MISC 4 INJECTIONS PER DAY DX  E11 8  1     No current facility-administered medications for this visit  Spoke with patient to see how he is doing after surgery  Patient reports he is doing well overall and denies any incisional issues or fevers  States his pain is an 8-9/10  Patient is able to ambulate around the house and complete ADLs  Educated the patient about the importance of preventing blood clots and provided measures how to prevent them  Patient has not moved his bowels since the surgery  Encouraged patient to take an over the counter stool softener, if he is taking narcotic pain medication  Encouraged fiber intake and fluids  Reviewed incision care with the patient  Advised that after three days he may take a shower and gently wash the surgical site with soap and water  Use clean wash cloth, towels, and clothing  Do not submerge in water until cleared by the surgeon  Do not apply any creams, ointments, or lotions to the site  Patient is aware to call the office if any redness, swelling, drainage, dehiscence of incision, or fever >100 F occurs  Patient is aware to call the office if any concerns or questions may arise   Reminded patient of his upcoming appointments with the date/time/location  Patient was appreciative for the call  VIRTUAL VISIT DISCLAIMER    Thiago Thorne acknowledges that he has consented to an online visit or consultation  He understands that the online visit is based solely on information provided by him, and that, in the absence of a face-to-face physical evaluation by the physician, the diagnosis he receives is both limited and provisional in terms of accuracy and completeness  This is not intended to replace a full medical face-to-face evaluation by the physician  Thiago Thorne understands and accepts these terms

## 2021-03-31 ENCOUNTER — OFFICE VISIT (OUTPATIENT)
Dept: NEUROSURGERY | Facility: CLINIC | Age: 61
End: 2021-03-31

## 2021-03-31 VITALS
BODY MASS INDEX: 34.13 KG/M2 | WEIGHT: 252 LBS | TEMPERATURE: 98 F | SYSTOLIC BLOOD PRESSURE: 120 MMHG | HEART RATE: 70 BPM | HEIGHT: 72 IN | RESPIRATION RATE: 16 BRPM | DIASTOLIC BLOOD PRESSURE: 82 MMHG

## 2021-03-31 DIAGNOSIS — Z96.89 STATUS POST INSERTION OF SPINAL CORD STIMULATOR: Primary | ICD-10-CM

## 2021-03-31 DIAGNOSIS — G89.4 CHRONIC PAIN DISORDER: ICD-10-CM

## 2021-03-31 DIAGNOSIS — F11.20 OPIOID DEPENDENCE, CONTINUOUS (HCC): ICD-10-CM

## 2021-03-31 PROCEDURE — 99024 POSTOP FOLLOW-UP VISIT: CPT | Performed by: NURSE PRACTITIONER

## 2021-03-31 NOTE — PROGRESS NOTES
Assessment/Plan:    Status post insertion of spinal cord stimulator  · As addressed in HPI  · 2 week post op presents for after care   · Status post insertion Medtronic Thoracic spinal cord stimulator has 2 surgical incisions low thoracici and right flank , skin closure with Monocryl suture and surgical glue covering, neither is visible on incisions  · Both incison  thoracic and right flank are clean, dry and intact, without erythema, dehiscence, drainage or s/s of infection, healing well approximated wound edges  Refer to photo attachment   · Appropriate healing of generator site for SCS activation and generator charging  · Thoracic incision edematous firm to touch, no fluctuance  · Met with [employer] Rep for SCS activation and programing , session data pending recite for record attachment    MEDTR[employer]IC SESSION REPORT   · Intensity/Amplitude: 6 2mA  · Pulse Width: 1000?s  · Rate: 80Hz  · SoftStart/Stop  · Intensity/Amplitude: 25 2 mA  · Cycling:enabled  · Contacts: midline contacts alternating contacts on the 16 contact electrode  · Configuration: Bipolar with (+) and (-)  · Waveform: Tonic      Plan  · Instructions for continued care as detailed in AVS and reviewed in detail  Chronic pain disorder  · As addressed in HPI  · As addressed in status post SCS implant  Medtronic  · IDDS  W/ Morphine infusion , continuous opioid dependence        Plan;  · As addressed in hPI  · Continue with Cr/ Mary Jane Noe for IT pain pump management      Opioid dependence, continuous ([address] )  · As addressed in hPI  · Morphine administration thru intrathecal pain pump  ·         PLAN  · Continue intrathecal pain pump management by Dr Mary Jane Noe         Subjective: 2 week postop SCS implant     Patient ID: Artie Reilly is a 64 y o  male     HPI   Has a longstanding history of chronic pain radicular and neuropathic   Has constant low back pain with radiation into right buttock, right groin, distally into right lower extremity down to right foot  Lumbar sacral and buttock pain is neuropathic secondary to sever tissue damage  After stage 3 pressure ulcers in area , status post myocutaneous flap surgery  EMG positive for axomnal neuropathy distally  Has diabetic peripheral neuropathy in bilateral lower extremities  , characterized as   constant aching, burning, associated with numbness and tingling in right leg, foot , and toes  Intrathecal pump with Morphine for chronic pain management longstanding history dating back to 2003 after undergoing surgery for brain aneurysm coiling via right femoral area sustaining peroneal nerve damage    Morphine pain pump is not delivering efficacy for relief of nerve pain in bilateral lower extremities and lumbar sacral and buttock neuropathic pain  He underwent a Medtronic spinal cord stimulator trial with Dr Flori Ireland  Achieving greater than 50 % efficacy  For neuropathic pain relief   He consulted with Dr Reed Munoz 2/25/2021 after assessment and review is deemed appropriate to proceed with surgery  Surgery was scheduled with Dr Reed Munoz for 3/17/2021 INSERTION THORACIC DORSAL COLUMN SPINAL CORD STIMULATOR PERCUTANEOUS W IMPLANTABLE PULSE GENERATOR, RIGHT (Right Spine Thoracic)---Medtronic       Impressions and treatment recommendations were discussed in detail with the patient who verbalized understanding, all questions were answered and contact information was given in the event future questions arise  REVIEW OF SYSTEMS  Review of Systems   Constitutional: Negative  HENT: Negative  Eyes: Negative for visual disturbance (No peripheral vision right and top )  Respiratory: Negative  Cardiovascular: Negative  Gastrointestinal: Positive for constipation  Endocrine: Negative  Genitourinary: Positive for difficulty urinating (improving)     Musculoskeletal: Positive for arthralgias, back pain (Low back pain into groin on the right side ), gait problem (Patient ambulates using a walker), myalgias, neck pain and neck stiffness  Burning pain in bilateral buttocks as well in bi/feet  Morphine pump placed  SCS placed    Skin: Positive for wound (surgical incisions)  Allergic/Immunologic: Negative  Neurological: Positive for weakness (right side and general), numbness (Neuropathy  Right leg is the worst   B/L feet) and headaches (controlled with meds)  Negative for dizziness and light-headedness  Hematological: Bruises/bleeds easily  Psychiatric/Behavioral: Positive for sleep disturbance  Meds/Allergies     Current Outpatient Medications   Medication Sig Dispense Refill    albuterol (ACCUNEB) 1 25 MG/3ML nebulizer solution Take 1 ampule by nebulization every 6 (six) hours as needed for wheezing      ARIPiprazole (ABILIFY) 5 mg tablet Take 10 mg by mouth daily       atorvastatin (LIPITOR) 40 mg tablet Take 40 mg by mouth daily   baclofen 10 mg tablet Take 10 mg by mouth 3 (three) times a day      CALCIUM PO Take 1 tablet by mouth daily      Cholecalciferol (VITAMIN D3) 5000 units CAPS Take 1 capsule (5,000 Units total) by mouth daily 30 capsule 6    clopidogrel (PLAVIX) 75 mg tablet Take 75 mg by mouth daily      Cyanocobalamin (VITAMIN B-12 PO) Take 1 tablet by mouth daily      fludrocortisone (FLORINEF) 0 1 mg tablet Take 1 tablet (0 1 mg total) by mouth daily  0    folic acid (FOLVITE) 1 mg tablet Take 1 mg by mouth daily   3    gabapentin (NEURONTIN) 300 mg capsule TAKE 1 CAPSULE (300 MG TOTAL) BY MOUTH AS NEEDED (MIGRAINE) 30 capsule 0    gabapentin (NEURONTIN) 600 MG tablet Take 600 mg by mouth 3 (three) times a day      hydrocortisone 1 % lotion APPLY TOPICALLY 2 (TWO) TIMES A DAY INDICATIONS  USING ON FEET   hydrOXYzine HCL (ATARAX) 25 mg tablet Take 75 mg by mouth 2 (two) times a day as needed for anxiety       memantine (NAMENDA) 10 mg tablet TAKE 1 TABLET (10 MG TOTAL) BY MOUTH 2 (TWO) TIMES A DAY        methocarbamol (ROBAXIN) 750 mg tablet Take 1 tablet (750 mg total) by mouth every 6 (six) hours as needed for muscle spasms 28 tablet 0    Morphine Sulfate Microinfusion (MORPHINE SULF MICROINFUSION PF IJ) Inject 14 mg as directed daily Via infusion pump      nystatin (MYCOSTATIN) cream Apply 1 application topically 2 (two) times a day      omeprazole (PriLOSEC) 40 MG capsule Take 40 mg by mouth daily      oxyCODONE-acetaminophen (PERCOCET) 5-325 mg per tablet Take 1 tablet by mouth every 4 (four) hours as needed for severe painMax Daily Amount: 6 tablets 30 tablet 0    Pediatric Multivit-Minerals-C (Polyvitamin/Iron) CHEW Chew 1 tablet daily      POTASSIUM PO Take 40 mEq by mouth 2 (two) times a day      prochlorperazine (COMPAZINE) 10 mg tablet Take 1 tablet (10 mg total) by mouth every 6 (six) hours as needed for nausea or vomiting (migraine) 20 tablet 0    sertraline (ZOLOFT) 100 mg tablet Take 1 5 tablets (150 mg total) by mouth daily (Patient taking differently: Take 200 mg by mouth daily )  0    tamsulosin (FLOMAX) 0 4 mg Take 0 4 mg by mouth daily with dinner   traZODone (DESYREL) 100 mg tablet Take 2 tablets (200 mg total) by mouth daily at bedtime as needed for sleep (Patient taking differently: Take 300 mg by mouth daily at bedtime as needed for sleep ) 14 tablet 0    ergocalciferol (VITAMIN D2) 50,000 units Take 50,000 Units by mouth once a week  0    nitroglycerin (NITROSTAT) 0 4 mg SL tablet Place 0 4 mg under the tongue every 5 (five) minutes as needed for chest pain (pt has not  used recently)   ULTICARE SHORT PEN NEEDLES 31G X 8 MM MISC 4 INJECTIONS PER DAY DX  E11 8  1     No current facility-administered medications for this visit          No Known Allergies    PAST HISTORY    Past Medical History:   Diagnosis Date    Acute on chronic diastolic congestive heart failure (HCC)     Altered gait     Alzheimer disease ([facility] [address] )     per patients,,early onset     Angina pectoris (HCC)     Anxiety     Arthritis  Brain aneurysm     coils placed    Cardiac disease     Chest pain 1/13/2016    Chronic kidney disease     Chronic pain     back/ right groin and rle- has morphine pump    Constipation     COPD (chronic obstructive pulmonary disease) (HCC)     Coronary artery disease     CPAP (continuous positive airway pressure) dependence     Decubital ulcer     sacral decub-occured 5/2019-sees wound care/debide in OR today 6/6/2019    Dependent on walker for ambulation     w/c for long distance    Depression     Diabetes mellitus (HCC)     insulin dependent    Dizziness     occ    Dysphagia     Enlarged prostate     Fall     GERD (gastroesophageal reflux disease)     Heart failure ([facility] [address] [facility]     Hiatal hernia     Hx of gastric bypass 11/19/2018    Hypercholesterolemia     Hypertension     MI (myocardial infarction) ([facility] [address] )     2017- stents x2    Migraine     Morbid obesity ([facility] [address] )     gastric bypass sleeve 11/2018-wt loss 125 lb    Neuropathy     Oxygen dependent     Q HS  2LPM with CPAP and prn during day 2-3 LPM     Pressure injury of skin     Renal disorder     Shortness of breath     Skin abnormality     sacral wound - covered with pad    Sleep apnea     Stented coronary artery     Stroke ([facility] [address] )     vision loss b/l  2005, residual R leg weakness    Urinary frequency     Use of cane as ambulatory aid     Wears dentures     Wears glasses     Wears glasses     Wheelchair dependent        Past Surgical History:   Procedure Laterality Date    BACK SURGERY      BRAIN SURGERY      CARDIAC CATHETERIZATION      with stents    CEREBRAL ANEURYSM REPAIR      with coils    COLONOSCOPY      ESOPHAGOGASTRODUODENOSCOPY N/A 7/1/2016    Procedure: ESOPHAGOGASTRODUODENOSCOPY (EGD); Surgeon: Robin Dinh MD;  [facility]: [facility];   Service:     GASTRIC BYPASS  11/19/2018    HERNIA REPAIR      HIATAL HERNIA REPAIR      INFUSION PUMP IMPLANTATION Left     morphine    INTRATHECAL PUMP IMPLANTATION Left 7/9/2020    Procedure: REVISION INTRATHECAL PAIN PUMP POCKET, LEFT ABDOMEN;  Surgeon: Delfina Calderón MD;  [facility];  Service: Neurosurgery    KNEE ARTHROSCOPY Right     KNEE ARTHROSCOPY Right     PERONEAL NERVE DECOMPRESSION Right     WA DEBRIDEMENT OPEN WOUND 20 SQ CM< N/A 6/6/2019    Procedure: EXCISIONAL DEBRIDEMENT OF SACRAL DECUBITUS ULCER;  Surgeon: Nafisa Mc MD;  [facility];  Service: General    WA ESOPHAGOGASTRODUODENOSCOPY TRANSORAL DIAGNOSTIC N/A 2/27/2017    Procedure: ESOPHAGOGASTRODUODENOSCOPY (EGD); Surgeon: Jaron De La Cruz MD;  [facility]; Service: Gastroenterology    WA ESOPHAGOGASTRODUODENOSCOPY TRANSORAL DIAGNOSTIC N/A 8/23/2018    Procedure: ESOPHAGOGASTRODUODENOSCOPY (EGD) with biopsy;  Surgeon: Jaron De La Cruz MD;  [facility];   Service: Gastroenterology    WA INSERT SPINE INFUSN [facility] N/A 1/19/2017    Procedure: REMOVAL / EXCHANGE INTRATHECAL PAIN PUMP;  Surgeon: Kane Comer MD;  [facility];  Service: Orthopedics    WA INSERT SPINE INFUSN [facility] N/A 5/16/2016    Procedure: REPLACEMENT AND PROGRAM PUMP ;  Surgeon: Kane Comer MD;  [facility];  Service: Orthopedics    WA INSERT/ REPLACE INFUSN PUMP,PROGRAMMABLE Left 10/13/2020    Procedure: EXPLORATION OF INTRATHECAL PAIN PUMP SYSTEM INTEGRITY FOR POSSIBLE REPLACEMENT OF CATHETER AND PUMP ;  Surgeon: Delfina Calderón MD;  [facility];  Service: Neurosurgery    [facility] 3/17/2021    Procedure: INSERTION THORACIC DORSAL COLUMN SPINAL CORD STIMULATOR PERCUTANEOUS W IMPLANTABLE PULSE GENERATOR, RIGHT;  Surgeon: Delfina Calderón MD;  [facility];  Service: Neurosurgery       Social History     Tobacco Use    Smoking status: Never Smoker    Smokeless tobacco: Never Used   Substance Use Topics    Alcohol use: Not Currently    Drug use: Not Currently     Types: Marijuana       Family History   Problem Relation Age of Onset    Diabetes unspecified Mother     Diabetes unspecified Brother     Diabetes unspecified Paternal Uncle     Diabetes unspecified Maternal Grandmother     Diabetes unspecified Paternal Grandmother     Diabetes unspecified Brother     Heart attack Father        The following portions of the patient's history were reviewed and updated as appropriate: allergies, current medications, past family history, past medical history, past social history, past surgical history and problem list       EXAM    Vitals:Blood pressure 120/82, pulse 70, temperature 98 °F (36 7 °C), temperature source Tympanic, resp  rate 16, height 6' (1 829 m), weight 114 kg (252 lb)  ,Body mass index is 34 18 kg/m²  Physical Exam  Vitals signs and nursing note reviewed  Constitutional:       Appearance: Normal appearance  He is obese  HENT:      Head: Normocephalic and atraumatic  Eyes:      General: No scleral icterus  Right eye: No discharge  Left eye: No discharge  Extraocular Movements: Extraocular movements intact  Conjunctiva/sclera: Conjunctivae normal    Cardiovascular:      Rate and Rhythm: Normal rate and regular rhythm  Pulmonary:      Effort: Pulmonary effort is normal  No respiratory distress  Musculoskeletal: Normal range of motion  Right lower leg: No edema  Left lower leg: No edema  Skin:     General: Skin is warm and dry  Neurological:      General: No focal deficit present  Mental Status: He is alert and oriented to person, place, and time  Psychiatric:         Mood and Affect: Mood normal          Behavior: Behavior normal          Neurologic Exam     Mental Status   Oriented to person, place, and time       Motor Exam     Strength   Right quadriceps: 5/5  Left quadriceps: 5/5  Right hamstrin/5  Left hamstrin/5  Right anterior tibial: 5/5  Left anterior tibial: 5/5  Right gastroc: 5/5  Left gastroc: 5/5    Sensory Exam   Peripheral neuropathy bilateral feet , sacral area nerve damage  From severe pressure ulcers --RE for spinal cord stimulator        Imaging Studies  Xr Spine Thoracic 2 Vw    Result Date: 3/22/2021  Narrative: C-ARM - thoracolumbar spine INDICATION:   Chronic pain syndrome   Procedure guidance  COMPARISON:  None TECHNIQUE: FLUOROSCOPY TIME:   111 SECONDS 1 FLUOROSCOPIC IMAGES FINDINGS: Fluoroscopic guidance provided for procedure guidance  Thoracolumbar spinal stimulator device placed  Osseous and soft tissue detail limited by technique  Impression: Fluoroscopic guidance provided for procedure guidance  Please refer to the separate procedure notes for additional details  Workstation performed: [id number]       I have personally reviewed pertinent reports     and I have personally reviewed pertinent films in PACS

## 2021-03-31 NOTE — ASSESSMENT & PLAN NOTE
· As addressed in HPI  · 2 week post op presents for after care   · Status post insertion Medtronic Thoracic spinal cord stimulator has 2 surgical incisions low thoracici and right flank , skin closure with Monocryl suture and surgical glue covering, neither is visible on incisions  · Both incison  thoracic and right flank are clean, dry and intact, without erythema, dehiscence, drainage or s/s of infection, healing well approximated wound edges  Refer to photo attachment   · Appropriate healing of generator site for SCS activation and generator charging  · Thoracic incision edematous firm to touch, no fluctuance  · Met with [employer] Rep for SCS activation and programing , session data pending recite for record attachment    [employer] SESSION REPORT   · Intensity/Amplitude: 6 2mA  · Pulse Width: 1000?s  · Rate: 80Hz  · SoftStart/Stop  · Intensity/Amplitude: 25 2 mA  · Cycling:enabled  · Contacts: midline contacts alternating contacts on the 16 contact electrode  · Configuration: Bipolar with (+) and (-)  · Waveform: Tonic      Plan  · Instructions for continued care as detailed in AVS and reviewed in detail

## 2021-03-31 NOTE — PATIENT INSTRUCTIONS
Instructions for continued care at 2 weeks postop thru 6 weeks postop  · At 2 weeks postop can resume restricted medications such as ASA, products containing ASA, NSAID, fish oils, and OTC products or as previously directed  --reports restarted al 4 th day after sx including Eliquis   · Resume driving 2 week postoperatively  --drove today  · Continue to observe incisions for redness, drainage, swelling dehiscence, increased pain, fever >/= 101, warmth to touch incision or skin surrounding, if occur call or report to office immediately for reassessment  · Explained monocryl reasonable sutures can take up to 90 days to reabsorb  --no remaining sutures   · Do not remove glue adherent to incision lines covering scabs will eventually disappear  ---no residual glue  · Continue showers using clean towel and wash cloth with OTC antibacterial body wash, pat dry after showering continue protocol for an additional 2 weeks  · Do not apply lotions, creams, powder, or ointments to surgical incisions  · Activity as tolerated, no bending, lifting  greater than 10 lbs, turning, stretching, no pulling, pushing  ambulation as tolerated  · Refrain from strenuous activity, bending, and  twisting back  · No Immersion in water such as swimming, hot tub, or tub bath  · If  there is any significant change condition  call and/or return to the office immediately for reassessment  · Explained in detail barrier protection when charging generator, never place charging device or mendez directly on incision, provide barrier such as an item of thin clothing  Always provide clean storage for generator and mendez  · Directed to discuss with Rep protocol for cleaning generator mendez  · Met with product rep for device programing and teaching, refer to attached session report  · Explained chronic pain relief may not be immediate after reprogramming can take  Up to 72 hours to feel effect          · Contact Rep immediately if there is any change in efficacy or concern over SCS system     · Contact office if unable to reach Rep or if the reps   intervention does  not resolve issue

## 2021-03-31 NOTE — ASSESSMENT & PLAN NOTE
· As addressed in hPI  · Morphine administration thru intrathecal pain pump  ·         PLAN  · Continue intrathecal pain pump management by Dr Francisco Zhao

## 2021-03-31 NOTE — ASSESSMENT & PLAN NOTE
· As addressed in HPI  · As addressed in status post SCS implant   [employer]tronic  · IDDS  W/ Morphine infusion , continuous opioid dependence        Plan;  · As addressed in hPI  · Continue with Cr/ Maurice Sousa for IT pain pump management

## 2021-04-01 ENCOUNTER — TELEPHONE (OUTPATIENT)
Dept: GASTROENTEROLOGY | Facility: MEDICAL CENTER | Age: 61
End: 2021-04-01

## 2021-05-12 ENCOUNTER — OFFICE VISIT (OUTPATIENT)
Dept: NEUROSURGERY | Facility: CLINIC | Age: 61
End: 2021-05-12

## 2021-05-12 VITALS
RESPIRATION RATE: 16 BRPM | DIASTOLIC BLOOD PRESSURE: 82 MMHG | SYSTOLIC BLOOD PRESSURE: 130 MMHG | TEMPERATURE: 96.6 F | HEART RATE: 76 BPM | WEIGHT: 234 LBS | BODY MASS INDEX: 31.69 KG/M2 | HEIGHT: 72 IN

## 2021-05-12 DIAGNOSIS — G89.4 CHRONIC PAIN DISORDER: ICD-10-CM

## 2021-05-12 DIAGNOSIS — H10.021 PINK EYE DISEASE OF RIGHT EYE: Primary | ICD-10-CM

## 2021-05-12 DIAGNOSIS — Z96.89 STATUS POST INSERTION OF SPINAL CORD STIMULATOR: ICD-10-CM

## 2021-05-12 PROCEDURE — 99024 POSTOP FOLLOW-UP VISIT: CPT | Performed by: NURSE PRACTITIONER

## 2021-05-12 RX ORDER — TOBRAMYCIN 3 MG/ML
2 SOLUTION/ DROPS OPHTHALMIC 4 TIMES DAILY
Qty: 2.8 ML | Refills: 0 | Status: SHIPPED | OUTPATIENT
Start: 2021-05-12 | End: 2021-05-19

## 2021-05-12 RX ORDER — TOBRAMYCIN 3 MG/ML
1 SOLUTION/ DROPS OPHTHALMIC EVERY 12 HOURS PRN
Qty: 0.7 ML | Refills: 0 | Status: SHIPPED | OUTPATIENT
Start: 2021-05-12 | End: 2021-05-12 | Stop reason: DRUGHIGH

## 2021-05-12 NOTE — PATIENT INSTRUCTIONS
6 WEEKS POST OP INSERTION THORACICI SPINAL CORD STIMULATOR     · Reinforced the following key points     · There are multiple ways the REP can program SCS system  · The first 6 weeks there is fluid buildup causing swelling around SCS/Leads, most healing done at 6 weeks but in your case resolution of fluid accumulation in right buttock will take longer  · It is not uncommon to experience pain in the battery incision up to 2-3 months  · After SCS reprogramming it may take 24 - 72 hours to obtain efficacy  · Always call Rep first if any change in pain control, this is important calling office will result in the same action calling the Rep first, this is the order of trouble shooting before an appointment is scheduled  · If pain persists after Rep programing and trouble shooting imagining may be indicated to assess paddle/lead positioning prior to scheduling an appointment  It is always important to r/o migration of  SCS, Leads , or IPG malposition  · The [employer] manages SCS for life and therefore has a responsibility to you  · Call immediately when you receive alert for EOL of IPG, low Battery indicator" to schedule appointment for IPG change  Delaying the call can result in IPG complete loss of power then loss of SCS function  If SCS stops function will require alternative measures for pain management  · Activity restrictions no longer apply, resume activity as tolerated, no longer need to follow BLT/stretching restrictions  Gradually increase activity back to baseline  · If you have concerns over surgical incision sites contact office immediately redness, drainage, dehiscence, or pain Can apply vitamin E cream or Aloe Vera directly to skin, incision if desire     · Hot tub maximum temperature use 101 degrees F

## 2021-05-12 NOTE — ASSESSMENT & PLAN NOTE
· As addressed in HPI  · 6 week postoperative vist insertion spinal cord stimulator , [employer]   · Pleased with pain relief for low back , sacral area and bilateral lower extremities  · Declined [employer] SCS program changes is satisfied with current programs  form 2 week postoperative vist   · Reports Dr Elfego Gorman office made adjustments to decrease generator use, was charging 2 x per day, is satisfied with adjustments  · Declined addition of adaptive stimulation , prefer  to adjust stimulation independently when supine  · Surgical incisions well healed right flank and lumbar --refer to photo attachments  Settings at 2 week po visit   · Intensity/Amplitude: 6 2mA  · Pulse Width: 1000?s  · Rate: 80Hz  · SoftStart/Stop  · Intensity/Amplitude: 25 2 mA  · Cycling:enabled  · Contacts: midline contacts alternating contacts on the 16 contact electrode  · Configuration: Bipolar with (+) and (-)  · Waveform: Tonic   A-B-C programs with 2 subgroups for each          Plan   · Instructions for continued care documented in AVS, reviewed in detail and copy provided

## 2021-05-12 NOTE — PROGRESS NOTES
Assessment/Plan:    Chronic pain disorder  · As addressed in HPI  · Has intrathecal pain  Managed by Dr Andrzej Sloan  · Has thoracic spinal cord stimulator 6 weeks post op insertion      PLAN  · As addressed in status post insertion spinal cord stimulator  Status post insertion of spinal cord stimulator  · As addressed in HPI  · 6 week postoperative vist insertion spinal cord stimulator , Med[employer]onic   · Pleased with pain relief for low back , sacral area and bilateral lower extremities  · Declined [employer] SCS program changes is satisfied with current programs  form 2 week postoperative vist   · Reports Dr Arvind Simons office made adjustments to decrease generator use, was charging 2 x per day, is satisfied with adjustments  · Declined addition of adaptive stimulation , prefer  to adjust stimulation independently when supine  · Surgical incisions well healed right flank and lumbar --refer to photo attachments  Settings at 2 week po visit   · Intensity/Amplitude: 6 2mA  · Pulse Width: 1000?s  · Rate: 80Hz  · SoftStart/Stop  · Intensity/Amplitude: 25 2 mA  · Cycling:enabled  · Contacts: midline contacts alternating contacts on the 16 contact electrode  · Configuration: Bipolar with (+) and (-)  · Waveform: Tonic   A-B-C programs with 2 subgroups for each          Plan   · Instructions for continued care documented in AVS, reviewed in detail and copy provided  [place] eye disease of right eye  · Right eye conjunctivitis/concern for pink eye   · Reports eye hurts and feel scratchy  · Reports past history of pink eye in household, verbalized understanding of good hand hygiene to prevention spread                Plan  · Tobrex ophthalmic gtts x 7 days , explained in detail eye gtt administration and and hand hygiene  · F/u with PCP in 1 week if eye redness has not resolved Crissy Clay            Subjective: 6 week postoperative visit     Patient ID: Elmer Wakefield is a 64 y o  male     HPI   Has a longstanding history of chronic pain radicular and neuropathic   Has constant low back pain with radiation into right buttock, right groin, distally into right lower extremity down to right foot  Lumbar sacral and buttock pain is neuropathic secondary to sever tissue damage  After stage 3 pressure ulcers in area , status post myocutaneous flap surgery  EMG positive for axomnal neuropathy distally  Has diabetic peripheral neuropathy in bilateral lower extremities  , characterized as   constant aching, burning, associated with numbness and tingling in right leg, foot , and toes       Intrathecal pump with Morphine for chronic pain management longstanding history dating back to 2003 after undergoing surgery for brain aneurysm coiling via right femoral area sustaining peroneal nerve damage     Morphine pain pump is not delivering efficacy for relief of nerve pain in bilateral lower extremities and lumbar sacral and buttock neuropathic pain      He underwent a [employer]tronic spinal cord stimulator trial with Dr Matthew Manzanares  Achieving greater than 50 % efficacy  For neuropathic pain relief       He consulted with Dr Jhonny Brooks 2/25/2021 after assessment and review is deemed appropriate to proceed with surgery      Surgery was scheduled with Dr Jhonny Brooks for 3/17/2021 INSERTION THORACIC DORSAL COLUMN SPINAL CORD STIMULATOR PERCUTANEOUS W IMPLANTABLE PULSE GENERATOR, RIGHT (Right Spine Thoracic)---[employer]         Impressions and treatment recommendations were discussed in detail with the patient who verbalized understanding, all questions were answered and contact information was given in the event future questions arise  REVIEW OF SYSTEMS  Review of Systems   Constitutional: Negative  HENT: Negative  Eyes: Negative for visual disturbance (No peripheral vision right and top )  Respiratory: Negative  Cardiovascular: Negative  Gastrointestinal: Positive for constipation  Endocrine: Negative      Genitourinary: Positive for difficulty urinating (improving)  Musculoskeletal: Positive for arthralgias, back pain (Low back pain into groin on the right side ), gait problem (Patient ambulates using a walker ), myalgias, neck pain and neck stiffness  Burning pain in bilateral buttocks as well in bi/feet  Morphine pump placed  SCS placed    Skin: Negative  Negative for wound  Allergic/Immunologic: Negative  Neurological: Positive for weakness (right side and general ), numbness (Neuropathy  Right leg with new numbness  B/L feet ) and headaches (controlled with meds  )  Negative for dizziness and light-headedness  Hematological: Bruises/bleeds easily  Psychiatric/Behavioral: Positive for sleep disturbance  Meds/Allergies     Current Outpatient Medications   Medication Sig Dispense Refill    albuterol (ACCUNEB) 1 25 MG/3ML nebulizer solution Take 1 ampule by nebulization every 6 (six) hours as needed for wheezing      ARIPiprazole (ABILIFY) 5 mg tablet Take 10 mg by mouth daily       atorvastatin (LIPITOR) 40 mg tablet Take 40 mg by mouth daily   baclofen 10 mg tablet Take 10 mg by mouth 3 (three) times a day      CALCIUM PO Take 1 tablet by mouth daily      clopidogrel (PLAVIX) 75 mg tablet Take 75 mg by mouth daily      Cyanocobalamin (VITAMIN B-12 PO) Take 1 tablet by mouth daily      ergocalciferol (VITAMIN D2) 50,000 units Take 50,000 Units by mouth once a week  0    fludrocortisone (FLORINEF) 0 1 mg tablet Take 1 tablet (0 1 mg total) by mouth daily  0    folic acid (FOLVITE) 1 mg tablet Take 1 mg by mouth daily   3    gabapentin (NEURONTIN) 300 mg capsule TAKE 1 CAPSULE (300 MG TOTAL) BY MOUTH AS NEEDED (MIGRAINE) 30 capsule 0    gabapentin (NEURONTIN) 600 MG tablet Take 600 mg by mouth 3 (three) times a day      hydrocortisone 1 % lotion APPLY TOPICALLY 2 (TWO) TIMES A DAY INDICATIONS  USING ON FEET        hydrOXYzine HCL (ATARAX) 25 mg tablet Take 75 mg by mouth 2 (two) times a day as needed for anxiety  memantine (NAMENDA) 10 mg tablet TAKE 1 TABLET (10 MG TOTAL) BY MOUTH 2 (TWO) TIMES A DAY   methocarbamol (ROBAXIN) 750 mg tablet Take 1 tablet (750 mg total) by mouth every 6 (six) hours as needed for muscle spasms 28 tablet 0    Morphine Sulfate Microinfusion (MORPHINE SULF MICROINFUSION PF IJ) Inject 14 mg as directed daily Via infusion pump      nystatin (MYCOSTATIN) cream Apply 1 application topically 2 (two) times a day      omeprazole (PriLOSEC) 40 MG capsule Take 40 mg by mouth daily      Pediatric Multivit-Minerals-C (Polyvitamin/Iron) CHEW Chew 1 tablet daily      POTASSIUM PO Take 40 mEq by mouth 2 (two) times a day      prochlorperazine (COMPAZINE) 10 mg tablet Take 1 tablet (10 mg total) by mouth every 6 (six) hours as needed for nausea or vomiting (migraine) 20 tablet 0    sertraline (ZOLOFT) 100 mg tablet Take 1 5 tablets (150 mg total) by mouth daily (Patient taking differently: Take 200 mg by mouth daily )  0    tamsulosin (FLOMAX) 0 4 mg Take 0 4 mg by mouth daily with dinner   traZODone (DESYREL) 100 mg tablet Take 2 tablets (200 mg total) by mouth daily at bedtime as needed for sleep (Patient taking differently: Take 300 mg by mouth daily at bedtime as needed for sleep ) 14 tablet 0    Cholecalciferol (VITAMIN D3) 5000 units CAPS Take 1 capsule (5,000 Units total) by mouth daily (Patient not taking: Reported on 5/12/2021) 30 capsule 6    nitroglycerin (NITROSTAT) 0 4 mg SL tablet Place 0 4 mg under the tongue every 5 (five) minutes as needed for chest pain (pt has not  used recently)          oxyCODONE-acetaminophen (PERCOCET) 5-325 mg per tablet Take 1 tablet by mouth every 4 (four) hours as needed for severe painMax Daily Amount: 6 tablets (Patient not taking: Reported on 5/12/2021) 30 tablet 0    tobramycin (Tobrex) 0 3 % SOLN Administer 2 drops to both eyes 4 (four) times a day for 7 days 2 8 mL 0    ULTICARE SHORT PEN NEEDLES 31G X 8 MM MISC 4 INJECTIONS PER DAY DX  E11 8  1     No current facility-administered medications for this visit          No Known Allergies    PAST HISTORY    Past Medical History:   Diagnosis Date    Acute on chronic diastolic congestive heart failure (HCC)     Altered gait     Alzheimer disease ([facility] [address] [facility]     per patients,,early onset     Angina pectoris ([facility] [address] [facility]     Anxiety     Arthritis     Brain aneurysm     coils placed    Cardiac disease     Chest pain 1/13/2016    Chronic kidney disease     Chronic pain     back/ right groin and rle- has morphine pump    Constipation     COPD (chronic obstructive pulmonary disease) (HCC)     Coronary artery disease     CPAP (continuous positive airway pressure) dependence     Decubital ulcer     sacral decub-occured 5/2019-sees wound care/debide in OR today 6/6/2019    Dependent on walker for ambulation     w/c for long distance    Depression     Diabetes mellitus (HCC)     insulin dependent    Dizziness     occ    Dysphagia     Enlarged prostate     Fall     GERD (gastroesophageal reflux disease)     [facility]     Hiatal hernia     Hx of gastric bypass 11/19/2018    Hypercholesterolemia     Hypertension     [facility]     2017- stents x2    Migraine     Morbid obesity [facility]     gastric bypass sleeve 11/2018-wt loss 125 lb    Neuropathy     Oxygen dependent     Q HS  2LPM with CPAP and prn during day 2-3 LPM     Pressure injury of skin     Renal disorder     Shortness of breath     Skin abnormality     sacral wound - covered with pad    Sleep apnea     Stented coronary artery     Stroke [facility]     vision loss b/l  2005, residual R leg weakness    Urinary frequency     Use of cane as ambulatory aid     Wears dentures     Wears glasses     Wears glasses     Wheelchair dependent        Past Surgical History:   Procedure Laterality Date    BACK SURGERY      BRAIN SURGERY      CARDIAC CATHETERIZATION      with stents    CEREBRAL ANEURYSM REPAIR      with coils    COLONOSCOPY      ESOPHAGOGASTRODUODENOSCOPY N/A 7/1/2016    Procedure: ESOPHAGOGASTRODUODENOSCOPY (EGD); Surgeon: Sam Leavitt MD;  [facility]: [facility]; Service:     GASTRIC BYPASS  11/19/2018    HERNIA REPAIR      HIATAL HERNIA REPAIR      INFUSION PUMP IMPLANTATION Left     morphine    INTRATHECAL PUMP IMPLANTATION Left 7/9/2020    Procedure: REVISION INTRATHECAL PAIN PUMP POCKET, LEFT ABDOMEN;  Surgeon: Noemí Martinez MD;  [facility];  Service: Neurosurgery    KNEE ARTHROSCOPY Right     KNEE ARTHROSCOPY Right     PERONEAL NERVE DECOMPRESSION Right     CO DEBRIDEMENT OPEN WOUND 20 SQ CM< N/A 6/6/2019    Procedure: EXCISIONAL DEBRIDEMENT OF SACRAL DECUBITUS ULCER;  Surgeon: Jackelin Limon MD;  [facility];  Service: General    CO ESOPHAGOGASTRODUODENOSCOPY TRANSORAL DIAGNOSTIC N/A 2/27/2017    Procedure: ESOPHAGOGASTRODUODENOSCOPY (EGD); Surgeon: Sam Leavitt MD;  [facility]; Service: Gastroenterology    CO ESOPHAGOGASTRODUODENOSCOPY TRANSORAL DIAGNOSTIC N/A 8/23/2018    Procedure: ESOPHAGOGASTRODUODENOSCOPY (EGD) with biopsy;  Surgeon: Sam Leavitt MD;  [facility];   Service: Gastroenterology    CO INSERT SPINE INFUSN [facility] N/A 1/19/2017    Procedure: REMOVAL / EXCHANGE INTRATHECAL PAIN PUMP;  Surgeon: Chantell Aranda MD;  [facility];  Service: Orthopedics    CO INSERT SPINE INFUSN [facility] N/A 5/16/2016    Procedure: REPLACEMENT AND PROGRAM PUMP ;  Surgeon: Chantell Aranda MD;  [facility];  Service: Orthopedics    CO INSERT/ REPLACE INFUSN PUMP,PROGRAMMABLE Left 10/13/2020    Procedure: EXPLORATION OF INTRATHECAL PAIN PUMP SYSTEM INTEGRITY FOR POSSIBLE REPLACEMENT OF CATHETER AND PUMP ;  Surgeon: Noemí Martinez MD;  [facility];  Service: Neurosurgery    CO PERCUT IMPLNT NEUROELECT,EPIDURAL Right 3/17/2021    Procedure: INSERTION THORACIC DORSAL COLUMN SPINAL CORD STIMULATOR PERCUTANEOUS W IMPLANTABLE PULSE GENERATOR, RIGHT;  Surgeon: Indy Gavin MD;  [facility];  Service: Neurosurgery       Social History     Tobacco Use    Smoking status: Never Smoker    Smokeless tobacco: Never Used   Substance Use Topics    Alcohol use: Not Currently    Drug use: Not Currently     Types: Marijuana       Family History   Problem Relation Age of Onset    Diabetes unspecified Mother     Diabetes unspecified Brother     Diabetes unspecified Paternal Uncle     Diabetes unspecified Maternal Grandmother     Diabetes unspecified Paternal Grandmother     Diabetes unspecified Brother     Heart attack Father        The following portions of the patient's history were reviewed and updated as appropriate: allergies, current medications, past family history, past medical history, past social history, past surgical history and problem list       EXAM    Vitals:Blood pressure 130/82, pulse 76, temperature (!) 96 6 °F (35 9 °C), temperature source Tympanic, resp  rate 16, height 6' (1 829 m), weight 106 kg (234 lb)  ,Body mass index is 31 74 kg/m²  Physical Exam  Vitals signs and nursing note reviewed  Constitutional:       General: He is not in acute distress  Appearance: Normal appearance  He is obese  He is not ill-appearing  Eyes:      Comments: Right pink eye   Cardiovascular:      Rate and Rhythm: Normal rate and regular rhythm  Pulmonary:      Effort: Pulmonary effort is normal  No respiratory distress  Skin:     General: Skin is warm and dry  Neurological:      General: No focal deficit present  Mental Status: He is alert and oriented to person, place, and time  Gait: Gait is intact  Psychiatric:         Mood and Affect: Mood normal          Behavior: Behavior normal          Neurologic Exam     Mental Status   Oriented to person, place, and time     Level of consciousness: alert    Motor Exam     Strength   Right quadriceps: 5/5  Left quadriceps: 5/5  Right hamstring: 5/5  Left hamstrin/5  Right anterior tibial: 5/5  Left anterior tibial: 5/5  Right gastroc: 5/5  Left gastroc: 5/5    Gait, Coordination, and Reflexes     Gait  Gait: normal      Imaging Studies  No results found

## 2021-05-12 NOTE — ASSESSMENT & PLAN NOTE
· Right eye conjunctivitis/concern for pink eye   · Reports eye hurts and feel scratchy  · Reports past history of pink eye in household, verbalized understanding of good hand hygiene to prevention spread                Plan  · Tobrex ophthalmic gtts x 7 days , explained in detail eye gtt administration and and hand hygiene  · F/u with PCP in 1 week if eye redness has not resolved Donna Whittaker

## 2021-05-12 NOTE — ASSESSMENT & PLAN NOTE
· As addressed in HPI  · Has intrathecal pain  Managed by Dr Nalani Gosselin  · Has thoracic spinal cord stimulator 6 weeks post op insertion      PLAN  · As addressed in status post insertion spinal cord stimulator

## 2021-08-11 ENCOUNTER — OFFICE VISIT (OUTPATIENT)
Dept: GASTROENTEROLOGY | Facility: MEDICAL CENTER | Age: 61
End: 2021-08-11
Payer: MEDICARE

## 2021-08-11 VITALS
SYSTOLIC BLOOD PRESSURE: 142 MMHG | HEART RATE: 64 BPM | DIASTOLIC BLOOD PRESSURE: 82 MMHG | TEMPERATURE: 98.5 F | WEIGHT: 261.2 LBS | BODY MASS INDEX: 35.43 KG/M2

## 2021-08-11 DIAGNOSIS — R13.19 ESOPHAGEAL DYSPHAGIA: ICD-10-CM

## 2021-08-11 DIAGNOSIS — K21.9 GASTROESOPHAGEAL REFLUX DISEASE WITHOUT ESOPHAGITIS: Primary | ICD-10-CM

## 2021-08-11 DIAGNOSIS — K59.09 OTHER CONSTIPATION: ICD-10-CM

## 2021-08-11 DIAGNOSIS — R11.0 NAUSEA: ICD-10-CM

## 2021-08-11 PROCEDURE — 99214 OFFICE O/P EST MOD 30 MIN: CPT | Performed by: PHYSICIAN ASSISTANT

## 2021-08-11 RX ORDER — ONDANSETRON 4 MG/1
4 TABLET, FILM COATED ORAL EVERY 8 HOURS PRN
Qty: 60 TABLET | Refills: 3 | Status: SHIPPED | OUTPATIENT
Start: 2021-08-11

## 2021-08-11 NOTE — H&P (VIEW-ONLY)
Assessment/Plan:     Diagnoses and all orders for this visit:    Gastroesophageal reflux disease without esophagitis  Esophageal dysphagia  Nausea  Patient has a history of GERD, nausea, dysphagia status post sleeve gastrectomy  He has been seen multiple times in the past for these symptoms  He has been using Compazine which was effective but no longer so  He does take omeprazole daily  His last endoscopy was in 2019 where he required dilation  It is possible that this is causing a recurrence of his symptoms  Would recommend a trial of Zofran as he states he is not taking this in the past   Would also recommend endoscopy for further evaluation   -     EGD; Future  -     ondansetron (ZOFRAN) 4 mg tablet; Take 1 tablet (4 mg total) by mouth every 8 (eight) hours as needed for nausea or vomiting    Other constipation  Patient has a history of constipation likely secondary to opioid use  He was previously recommended to take MiraLax which she had stopped  He is only having 2 bowel movements per week  Would recommend resuming MiraLax to help improve bowel function which may also help improve his upper GI symptoms  Will see him back after to discuss all results  Subjective:      Patient ID: Meron Funez is a 64 y o  male  HPI     This is a follow-up for nausea, vomiting, dysphagia and constipation  Patient has a past medical history of diabetes, COPD, morphine pump due to chronic back pain, CAD status post multiple stents, on Plavix, history of gastric sleeve  Patient is complaining of nausea, vomiting and dysphagia which has been intermittent ever since his gastric sleeve in 2018  He was last seen by us in 2019 when he was admitted for similar symptoms  He does take omeprazole 40 milligrams daily  He does use Compazine as needed for nausea but states that this has been less effective recently  He does admit to dysphagia, specifically with meat    He did have an endoscopy in 2019 at [facility] which showed the sleeve appeared dilated in the fundus with significant narrowing as a transition into the sleeve, retained food was seen, dilation was performed  He also complains of constipation  He states he typically has 2 bowel movements per week  He is not taking any medication for his constipation  He states he had a colonoscopy 2 years ago      Patient Active Problem List   Diagnosis    Type 2 diabetes mellitus with [facility] complication [facility]    [facility] diastolic congestive [facility] failure ([facility]    Coronary artery disease involving native coronary artery    Morbid obesity ([facility] )    CVA (cerebral vascular accident) ([facility] )    Stroke ([facility] )    Stented coronary artery    Obstructive sleep apnea syndrome    CPAP (continuous positive airway pressure) dependence    Brain aneurysm    Alzheimer disease ([facility] )    Chronic pain disorder    Other constipation    Coronary artery disease    Sleep apnea    Bilateral leg edema    Chronic respiratory failure ([facility]    Stage 3 chronic kidney disease ([facility]    GERD (gastroesophageal reflux disease)    Opioid dependence, continuous ([facility])    Esophageal dysphagia    Chronic migraine without aura without status migrainosus, not intractable    Hyperlipidemia    Vitamin D deficiency    Fall at home    Pain and swelling of left knee    Pressure injury of skin of sacral region    Symptomatic bradycardia    Bilateral foot pain    Orthostatic hypotension    Primary osteoarthritis of both knees    Type 2 diabetes mellitus with diabetic neuropathy, with long-term current use of insulin ([facility])    Mild cognitive impairment    COPD (chronic obstructive pulmonary disease) ([facility]    Major depressive disorder, recurrent, moderate ([facility])    Radiculopathy, lumbosacral region    Neuropathy    Presence of intrathecal pump    Malfunction of intrathecal infusion pump    Preoperative examination    Aftercare following surgery    Skin inflammation    Status post insertion of spinal cord stimulator    Pink eye disease of right eye     No Known Allergies  Current Outpatient Medications on File Prior to Visit   Medication Sig    albuterol (ACCUNEB) 1 25 MG/3ML nebulizer solution Take 1 ampule by nebulization every 6 (six) hours as needed for wheezing    ARIPiprazole (ABILIFY) 5 mg tablet Take 10 mg by mouth daily     atorvastatin (LIPITOR) 40 mg tablet Take 40 mg by mouth daily   baclofen 10 mg tablet Take 10 mg by mouth 3 (three) times a day    CALCIUM PO Take 1 tablet by mouth daily    clopidogrel (PLAVIX) 75 mg tablet Take 75 mg by mouth daily    Cyanocobalamin (VITAMIN B-12 PO) Take 1 tablet by mouth daily    ergocalciferol (VITAMIN D2) 50,000 units Take 50,000 Units by mouth once a week    fludrocortisone (FLORINEF) 0 1 mg tablet Take 1 tablet (0 1 mg total) by mouth daily    folic acid (FOLVITE) 1 mg tablet Take 1 mg by mouth daily     gabapentin (NEURONTIN) 300 mg capsule TAKE 1 CAPSULE (300 MG TOTAL) BY MOUTH AS NEEDED (MIGRAINE)    gabapentin (NEURONTIN) 600 MG tablet Take 600 mg by mouth 3 (three) times a day    hydrocortisone 1 % lotion APPLY TOPICALLY 2 (TWO) TIMES A DAY INDICATIONS  USING ON FEET   hydrOXYzine HCL (ATARAX) 25 mg tablet Take 75 mg by mouth 2 (two) times a day as needed for anxiety     memantine (NAMENDA) 10 mg tablet TAKE 1 TABLET (10 MG TOTAL) BY MOUTH 2 (TWO) TIMES A DAY   methocarbamol (ROBAXIN) 750 mg tablet Take 1 tablet (750 mg total) by mouth every 6 (six) hours as needed for muscle spasms    Morphine Sulfate Microinfusion (MORPHINE SULF MICROINFUSION PF IJ) Inject 14 mg as directed daily Via infusion pump    nitroglycerin (NITROSTAT) 0 4 mg SL tablet Place 0 4 mg under the tongue every 5 (five) minutes as needed for chest pain (pt has not  used recently)        nystatin (MYCOSTATIN) cream Apply 1 application topically 2 (two) times a day    omeprazole (PriLOSEC) 40 MG capsule Take 40 mg by mouth daily    Pediatric Multivit-Minerals-C (Polyvitamin/Iron) CHEW Chew 1 tablet daily    POTASSIUM PO Take 40 mEq by mouth 2 (two) times a day    prochlorperazine (COMPAZINE) 10 mg tablet Take 1 tablet (10 mg total) by mouth every 6 (six) hours as needed for nausea or vomiting (migraine)    tamsulosin (FLOMAX) 0 4 mg Take 0 4 mg by mouth daily with dinner   ULTICARE SHORT PEN NEEDLES 31G X 8 MM MISC 4 INJECTIONS PER DAY DX  E11 8    Cholecalciferol (VITAMIN D3) 5000 units CAPS Take 1 capsule (5,000 Units total) by mouth daily (Patient not taking: Reported on 5/12/2021)    oxyCODONE-acetaminophen (PERCOCET) 5-325 mg per tablet Take 1 tablet by mouth every 4 (four) hours as needed for severe painMax Daily Amount: 6 tablets (Patient not taking: Reported on 5/12/2021)    sertraline (ZOLOFT) 100 mg tablet Take 1 5 tablets (150 mg total) by mouth daily (Patient not taking: Reported on 8/11/2021)    traZODone (DESYREL) 100 mg tablet Take 2 tablets (200 mg total) by mouth daily at bedtime as needed for sleep (Patient not taking: Reported on 8/11/2021)     No current facility-administered medications on file prior to visit       Family History   Problem Relation Age of Onset    Diabetes unspecified Mother     Diabetes unspecified Brother     Diabetes unspecified Paternal Uncle     Diabetes unspecified Maternal Grandmother     Diabetes unspecified Paternal Grandmother     Diabetes unspecified Brother     Heart attack Father      Past Medical History:   Diagnosis Date    Acute on chronic diastolic congestive heart failure (HCC)     Altered gait     Alzheimer disease [facility]     per patients,,early onset     Angina pectoris ([address] )     Anxiety     Arthritis     Brain aneurysm     coils placed    Cardiac disease     Chest pain 1/13/2016    Chronic kidney disease     Chronic pain     back/ right groin and rle- has morphine pump    Constipation     COPD (chronic obstructive pulmonary disease) (HCC)     Coronary artery disease     CPAP (continuous positive airway pressure) dependence     Decubital ulcer     sacral decub-occured 5/2019-sees wound care/debide in OR today 6/6/2019    Dependent on walker for ambulation     w/c for long distance    Depression     Diabetes mellitus (HCC)     insulin dependent    Dizziness     occ    Dysphagia     Enlarged prostate     Fall     GERD (gastroesophageal reflux disease)     Heart failure ([facility] )     Hiatal hernia     Hx of gastric bypass 11/19/2018    Hypercholesterolemia     Hypertension     MI (myocardial infarction) ([facility] )     2017- stents x2    Migraine     Morbid obesity ([facility] )     gastric bypass sleeve 11/2018-wt loss 125 lb    Neuropathy     Oxygen dependent     Q HS  2LPM with CPAP and prn during day 2-3 LPM     Pressure injury of skin     Renal disorder     Shortness of breath     Skin abnormality     sacral wound - covered with pad    Sleep apnea     Stented coronary artery     Stroke ([facility] )     vision loss b/l  2005, residual R leg weakness    Urinary frequency     Use of cane as ambulatory aid     Wears dentures     Wears glasses     Wears glasses     Wheelchair dependent      Social History     Socioeconomic History    Marital status: /Civil Union     Spouse name: Joselyn Ledbetter Number of children: 5    Years of education: None    Highest education level: GED or equivalent   Occupational History    None   Tobacco Use    Smoking status: Never Smoker    Smokeless tobacco: Never Used   Vaping Use    Vaping Use: Never used   Substance and Sexual Activity    Alcohol use: Not Currently    Drug use: Not Currently     Types: Marijuana    Sexual activity: Not Currently   Other Topics Concern    None   Social History Narrative    None     Social Determinants of Health     Financial Resource Strain: High Risk    Difficulty of Paying Living Expenses: Very hard   Food Insecurity: Food Insecurity Present    Worried About Running Out of Food in the Last Year: Sometimes true    Ran Out of Food in the Last Year: Sometimes true   Transportation Needs: Unmet Transportation Needs    Lack of Transportation (Medical): No    Lack of Transportation (Non-Medical): Yes   Physical Activity:     Days of Exercise per Week:     Minutes of Exercise per Session:    Stress:     Feeling of Stress :    Social Connections:     Frequency of Communication with Friends and Family:     Frequency of Social Gatherings with Friends and Family:     Attends Congregation Services:     Active Member of Clubs or Organizations:     Attends Club or Organization Meetings:     Marital Status:    Intimate Partner Violence:     Fear of Current or Ex-Partner:     Emotionally Abused:     Physically Abused:     Sexually Abused:      Past Surgical History:   Procedure Laterality Date    [address]      with stents    CEREBRAL ANEURYSM REPAIR      with coils    COLONOSCOPY      ESOPHAGOGASTRODUODENOSCOPY N/A 7/1/2016    Procedure: ESOPHAGOGASTRODUODENOSCOPY (EGD); Surgeon: Katerin Ferrer MD;  [facility]; Service:     GASTRIC BYPASS  11/19/2018    HERNIA REPAIR      HIATAL HERNIA REPAIR      INFUSION PUMP IMPLANTATION Left     morphine    INTRATHECAL PUMP IMPLANTATION Left 7/9/2020    Procedure: REVISION INTRATHECAL PAIN PUMP POCKET, LEFT ABDOMEN;  Surgeon: Chetna Peraza MD;  [facility];  Service: Neurosurgery    KNEE ARTHROSCOPY Right     KNEE ARTHROSCOPY Right     PERONEAL NERVE DECOMPRESSION Right     GA DEBRIDEMENT OPEN WOUND 20 SQ CM< N/A 6/6/2019    Procedure: EXCISIONAL DEBRIDEMENT OF SACRAL DECUBITUS ULCER;  Surgeon: Daria Varela MD;  [facility];  Service: General    GA ESOPHAGOGASTRODUODENOSCOPY TRANSORAL DIAGNOSTIC N/A 2/27/2017    Procedure: ESOPHAGOGASTRODUODENOSCOPY (EGD); Surgeon: Katerin Ferrer MD;  [facility];   Service: Gastroenterology    GA ESOPHAGOGASTRODUODENOSCOPY TRANSORAL DIAGNOSTIC N/A 8/23/2018    Procedure: ESOPHAGOGASTRODUODENOSCOPY (EGD) with biopsy;  Surgeon: Estela Alejandro MD;  [facility]; Service: Gastroenterology    MS INSERT SPINE INFUSN [facility] N/A 1/19/2017    Procedure: REMOVAL / EXCHANGE INTRATHECAL PAIN PUMP;  Surgeon: Jeff Cyr MD;  [facility];  Service: Orthopedics    MS INSERT SPINE INFUSN [facility] N/A 5/16/2016    Procedure: REPLACEMENT AND PROGRAM PUMP ;  Surgeon: Jeff Cyr MD;  [facility];  Service: Orthopedics    MS INSERT/ REPLACE INFUSN PUMP,PROGRAMMABLE Left 10/13/2020    Procedure: EXPLORATION OF INTRATHECAL PAIN PUMP SYSTEM INTEGRITY FOR POSSIBLE REPLACEMENT OF CATHETER AND PUMP ;  Surgeon: Mary Chase MD;  [facility];  Service: Neurosurgery    MS PERCUT IMPLNT [address] Right 3/17/2021    Procedure: INSERTION THORACIC DORSAL COLUMN SPINAL CORD STIMULATOR PERCUTANEOUS W IMPLANTABLE PULSE GENERATOR, RIGHT;  Surgeon: Mary Chase MD;  [facility];  Service: Neurosurgery         Review of Systems   All other systems reviewed and are negative  Objective:      /82   Pulse 64   Temp 98 5 °F (36 9 °C)   Wt 118 kg (261 lb 3 2 oz)   BMI 35 43 kg/m²          Physical Exam  Constitutional:       Appearance: Normal appearance  He is well-developed  HENT:      Head: Normocephalic and atraumatic  Eyes:      Conjunctiva/sclera: Conjunctivae normal    Cardiovascular:      Rate and Rhythm: Normal rate and regular rhythm  Pulmonary:      Effort: Pulmonary effort is normal       Breath sounds: Normal breath sounds  Abdominal:      General: Bowel sounds are normal  There is no distension  Palpations: Abdomen is soft  Tenderness: There is no abdominal tenderness  Musculoskeletal:      Cervical back: Normal range of motion  Comments: Walks with a walker   Skin:     General: Skin is warm and dry     Neurological: Mental Status: He is alert and oriented to person, place, and time     Psychiatric:         Mood and Affect: Mood normal          Behavior: Behavior normal

## 2021-08-11 NOTE — PATIENT INSTRUCTIONS
The patient is scheduled at [address] [place] [address] for an EGD with Dr Talia Escamilla on 9/2/2021  Prep instructions have been gone over in the office, with the patient, by the MA  The patient is aware that they will receive a call with the arrival time the day prior to procedure and that they will need a  the day of the procedure  I have asked the patient to call with any questions that they might have prior to procedure

## 2021-08-13 ENCOUNTER — TELEPHONE (OUTPATIENT)
Dept: PREADMISSION TESTING | Facility: HOSPITAL | Age: 61
End: 2021-08-13

## 2021-08-23 ENCOUNTER — TELEPHONE (OUTPATIENT)
Dept: GASTROENTEROLOGY | Facility: MEDICAL CENTER | Age: 61
End: 2021-08-23

## 2021-08-23 NOTE — TELEPHONE ENCOUNTER
Left message for patient to call our office (back line phone number provided) regarding his medication clearance for his upcoming procedure  [facility] Tony Ybarra did not prescribe Plavix or Warfarin  Regarding to provider patient is not taking Warfarin anymore  [facility] did not sign medication clearance for Plavix hold 5 days prior to procedure

## 2021-08-23 NOTE — TELEPHONE ENCOUNTER
Anayeli Montes to [facility] regarding medication clearance  Still not signed by Physician yet  Provided back line fax number to nurses

## 2021-08-24 ENCOUNTER — TELEPHONE (OUTPATIENT)
Dept: GASTROENTEROLOGY | Facility: MEDICAL CENTER | Age: 61
End: 2021-08-24

## 2021-08-24 NOTE — TELEPHONE ENCOUNTER
Dr Barby Garcia,  Please see attachment regarding 5 day hold - going back and forth with [facility] and not coming to a conclusion  Patient is scheduled for EGD on 9/2/21 at  [facility]  Please advise

## 2021-08-24 NOTE — TELEPHONE ENCOUNTER
Hi,    The attachment seems to say the patient is not on Warfarin, and it is okay to hold the plavix for the procedure (ideally 5 days) as long as he remains on Aspirin  I personally would be okay doing the procedure on aspirin and plavix  We can clarify with Dr Therese Boas next week when he returns since he is the one doing it         Thank you

## 2021-08-30 NOTE — TELEPHONE ENCOUNTER
Left message for patient to hold Plavix and to continue Aspirin  Pt advised to call our office to confirm he received the message  Left back line phone number [place] office  Dr Dorothy Sandoval,  His procedure is on 9/2/21 to hold the Plavix we do not have the complete 5 day hold  Please advise

## 2021-08-31 NOTE — TELEPHONE ENCOUNTER
Left another message for patient that he should have not taken his Plavix starting yesterday 8/30/21  Asked to please call our office to confirm the hold of his medication  Left back line phone number for patient to call

## 2021-09-01 ENCOUNTER — TELEPHONE (OUTPATIENT)
Dept: GASTROENTEROLOGY | Facility: HOSPITAL | Age: 61
End: 2021-09-01

## 2021-09-01 ENCOUNTER — ANESTHESIA (OUTPATIENT)
Dept: ANESTHESIOLOGY | Facility: HOSPITAL | Age: 61
End: 2021-09-01

## 2021-09-01 ENCOUNTER — TELEPHONE (OUTPATIENT)
Dept: GASTROENTEROLOGY | Facility: MEDICAL CENTER | Age: 61
End: 2021-09-01

## 2021-09-01 ENCOUNTER — ANESTHESIA EVENT (OUTPATIENT)
Dept: ANESTHESIOLOGY | Facility: HOSPITAL | Age: 61
End: 2021-09-01

## 2021-09-01 NOTE — ANESTHESIA PREPROCEDURE EVALUATION
Procedure:  PRE-OP ONLY    Relevant Problems   CARDIO  symtomatic bradycardia    (+) Brain aneurysm   (+) Chronic diastolic congestive heart failure (HCC)   (+) Coronary artery disease   (+) Coronary artery disease involving native coronary artery   (+) Hyperlipidemia   (+) Stented coronary artery      ENDO   (+) Type 2 diabetes mellitus with diabetic neuropathy, with long-term current use of insulin (HCC)   (+) Type 2 diabetes mellitus with renal complication (HCC)      GI/HEPATIC   (+) Esophageal dysphagia   (+) GERD (gastroesophageal reflux disease)      /RENAL   (+) Stage 3 chronic kidney disease (HCC)      MUSCULOSKELETAL   (+) Primary osteoarthritis of both knees      NEURO/PSYCH   (+) Alzheimer disease ([facility])   (+) CVA (cerebral vascular accident) ([facility] )   (+) Chronic pain disorder   (+) Major depressive disorder, recurrent, moderate ([facility])   (+) Opioid dependence, continuous ([facility])   (+) Stroke (HCC)      PULMONARY   (+) COPD (chronic obstructive pulmonary disease) ([facility])   (+) Chronic respiratory failure ([facility])   (+) Obstructive sleep apnea syndrome   (+) Sleep apnea      Other   (+) CPAP (continuous positive airway pressure) dependence   (+) Malfunction of intrathecal infusion pump   (+) Mild cognitive impairment   (+) Morbid obesity ([facility])   (+) Presence of intrathecal pump   (+) Status post insertion of spinal cord stimulator   NPO verified; Denies GERD   Patient denies chest pain, YAO, Asthma,   COPD not on O2   Intrathecal pump in situ     3/17/21  Sinus rhythm with 1st degree A-V block  Left axis deviation  Right bundle branch block  Inferior infarct (cited on or before 29-JAN-2020)  Abnormal ECG  When compared with ECG of 29-JAN-2020 15:48,  Questionable change in initial forces of Inferior leads  Confirmed by Arturo Escalera ([id number]) on 3/17/2021 7:54:32 AM    TTE 8/11/19   SUMMARY     LEFT VENTRICLE:  Normal left ventricular systolic function, EF 09%  Normal left ventricular cavity size   Moderate concentric left ventricular hypertrophy  Normal left ventricular wall motion without regional wall motion abnormalities  Normal left  ventricular diastolic function  Normal left atrial pressures      LEFT ATRIUM:  Mild left atrial enlargement      RIGHT ATRIUM:  Mild right atrial enlargement  Left atrium larger than right atrium      AORTIC VALVE:  The aortic valve is tricuspid  The non coronary cusp is heavily calcified and the left coronary cusp is mild-to-moderately calcified  All 3 leaflets have good excursion  There is no aortic stenosis or regurgitation      TRICUSPID VALVE:  There was trace regurgitation  Physical Exam    Airway    Mallampati score: III  TM Distance: >3 FB  Neck ROM: full     Dental   upper dentures and lower dentures,     Cardiovascular      Pulmonary      Other Findings        Anesthesia Plan  ASA Score- 3     Anesthesia Type- IV sedation with anesthesia with ASA Monitors  Additional Monitors:   Airway Plan:     Comment: Patient seen and evaluated  Plan Factors-Exercise tolerance (METS): >4 METS  Chart reviewed  EKG reviewed  Imaging results reviewed  Existing labs reviewed  Patient summary reviewed  Induction- intravenous  Postoperative Plan-     Informed Consent- Anesthetic plan and risks discussed with patient  I personally reviewed this patient with the CRNA  Discussed and agreed on the Anesthesia Plan with the CRNA               Lab Results   Component Value Date    HGBA1C 5 0 03/03/2021       Lab Results   Component Value Date     (L) 10/21/2015    K 3 7 03/03/2021     (H) 03/03/2021    CO2 28 03/03/2021    ANIONGAP 9 10/21/2015    BUN 8 03/03/2021    CREATININE 1 06 03/03/2021    GLUCOSE 224 (H) 10/21/2015    GLUF 88 03/03/2021    CALCIUM 9 1 03/03/2021    CORRECTEDCA 9 7 03/03/2021    AST 21 03/03/2021    ALT 33 03/03/2021    ALKPHOS 67 03/03/2021    PROT 5 9 (L) 10/19/2015    BILITOT 0 81 10/19/2015    EGFR 77 03/03/2021       Lab Results Component Value Date    WBC 9 84 03/03/2021    HGB 14 7 03/03/2021    HCT 45 2 03/03/2021    MCV 96 03/03/2021     03/03/2021    Sinus rhythm with 1st degree A-V block  Left axis deviation  Right bundle branch block  Inferior infarct (cited on or before 29-JAN-2020)  Abnormal ECG  When compared with ECG of 29-JAN-2020 15:48,  Questionable change in initial forces of Inferior leads  Confirmed by [employer] ([id number]) on 3/17/2021 7:54:32 AM    Echo 2019   LEFT VENTRICLE:  Normal left ventricular systolic function, EF 62%  Normal left ventricular cavity size  Moderate concentric left ventricular hypertrophy  Normal left ventricular wall motion without regional wall motion abnormalities  Normal left  ventricular diastolic function  Normal left atrial pressures      LEFT ATRIUM:  Mild left atrial enlargement      RIGHT ATRIUM:  Mild right atrial enlargement  Left atrium larger than right atrium      AORTIC VALVE:  The aortic valve is tricuspid  The non coronary cusp is heavily calcified and the left coronary cusp is mild-to-moderately calcified  All 3 leaflets have good excursion   There is no aortic stenosis or regurgitation      TRICUSPID VALVE:  There was trace regurgitation      PULMONARY ARTERIES:  In adequate tricuspid regurgitation to evaluate pulmonary artery systolic pressures

## 2021-09-01 NOTE — TELEPHONE ENCOUNTER
Called patient on all 3 numbers and left message again to please call our office (back line phone number provided) to let us know if he was holding the Plavix

## 2021-09-02 ENCOUNTER — HOSPITAL ENCOUNTER (OUTPATIENT)
Dept: GASTROENTEROLOGY | Facility: HOSPITAL | Age: 61
Setting detail: OUTPATIENT SURGERY
Discharge: HOME | End: 2021-09-02
Attending: INTERNAL MEDICINE | Admitting: INTERNAL MEDICINE
Payer: MEDICARE

## 2021-09-02 ENCOUNTER — ANESTHESIA (OUTPATIENT)
Dept: GASTROENTEROLOGY | Facility: HOSPITAL | Age: 61
End: 2021-09-02

## 2021-09-02 ENCOUNTER — ANESTHESIA EVENT (OUTPATIENT)
Dept: GASTROENTEROLOGY | Facility: HOSPITAL | Age: 61
End: 2021-09-02

## 2021-09-02 VITALS
RESPIRATION RATE: 20 BRPM | DIASTOLIC BLOOD PRESSURE: 86 MMHG | SYSTOLIC BLOOD PRESSURE: 138 MMHG | OXYGEN SATURATION: 98 % | TEMPERATURE: 97.8 F | HEART RATE: 58 BPM

## 2021-09-02 DIAGNOSIS — R13.19 ESOPHAGEAL DYSPHAGIA: ICD-10-CM

## 2021-09-02 DIAGNOSIS — K21.9 GASTROESOPHAGEAL REFLUX DISEASE WITHOUT ESOPHAGITIS: ICD-10-CM

## 2021-09-02 PROCEDURE — 88305 TISSUE EXAM BY PATHOLOGIST: CPT | Performed by: PATHOLOGY

## 2021-09-02 PROCEDURE — 88112 CYTOPATH CELL ENHANCE TECH: CPT | Performed by: PATHOLOGY

## 2021-09-02 PROCEDURE — 43239 EGD BIOPSY SINGLE/MULTIPLE: CPT | Performed by: INTERNAL MEDICINE

## 2021-09-02 RX ORDER — PROPOFOL 10 MG/ML
INJECTION, EMULSION INTRAVENOUS AS NEEDED
Status: DISCONTINUED | OUTPATIENT
Start: 2021-09-02 | End: 2021-09-02

## 2021-09-02 RX ORDER — SODIUM CHLORIDE 9 MG/ML
INJECTION, SOLUTION INTRAVENOUS CONTINUOUS PRN
Status: DISCONTINUED | OUTPATIENT
Start: 2021-09-02 | End: 2021-09-02

## 2021-09-02 RX ORDER — LIDOCAINE HYDROCHLORIDE 10 MG/ML
INJECTION, SOLUTION EPIDURAL; INFILTRATION; INTRACAUDAL; PERINEURAL AS NEEDED
Status: DISCONTINUED | OUTPATIENT
Start: 2021-09-02 | End: 2021-09-02

## 2021-09-02 RX ORDER — SODIUM CHLORIDE 9 MG/ML
50 INJECTION, SOLUTION INTRAVENOUS CONTINUOUS
Status: DISCONTINUED | OUTPATIENT
Start: 2021-09-02 | End: 2021-09-06 | Stop reason: HOSPADM

## 2021-09-02 RX ADMIN — SODIUM CHLORIDE: 0.9 INJECTION, SOLUTION INTRAVENOUS at 09:40

## 2021-09-02 RX ADMIN — PROPOFOL 150 MG: 10 INJECTION, EMULSION INTRAVENOUS at 09:51

## 2021-09-02 RX ADMIN — SODIUM CHLORIDE 50 ML/HR: 0.9 INJECTION, SOLUTION INTRAVENOUS at 08:14

## 2021-09-02 RX ADMIN — LIDOCAINE HYDROCHLORIDE 50 MG: 10 INJECTION, SOLUTION EPIDURAL; INFILTRATION; INTRACAUDAL; PERINEURAL at 09:51

## 2021-09-02 RX ADMIN — PROPOFOL 100 MG: 10 INJECTION, EMULSION INTRAVENOUS at 09:58

## 2021-09-02 NOTE — ANESTHESIA PREPROCEDURE EVALUATION
Procedure:  EGD    Relevant Problems   CARDIO  symtomatic bradycardia    (+) Chronic diastolic congestive heart failure (HCC)   (+) Coronary artery disease   (+) Coronary artery disease involving native coronary artery   (+) Hyperlipidemia      ENDO   (+) Type 2 diabetes mellitus with diabetic neuropathy, with long-term current use of insulin (HCC)   (+) Type 2 diabetes mellitus with renal complication (HCC)      GI/HEPATIC   (+) Esophageal dysphagia   (+) GERD (gastroesophageal reflux disease)      /RENAL   (+) Stage 3 chronic kidney disease (HCC)      MUSCULOSKELETAL   (+) Primary osteoarthritis of both knees      NEURO/PSYCH   (+) Alzheimer disease (HCC)   (+) CVA (cerebral vascular accident) ([facility] )   (+) Chronic pain disorder   (+) Major depressive disorder, recurrent, moderate ([facility])   (+) Opioid dependence, continuous (HCC)   (+) Stroke (HCC)      PULMONARY   (+) COPD (chronic obstructive pulmonary disease) (HCC)   (+) Chronic respiratory failure (HCC)   (+) Obstructive sleep apnea syndrome   (+) Sleep apnea   NPO verified; Denies GERD   Patient denies chest pain, YAO, Asthma,   COPD not on O2   Intrathecal pump in situ     3/17/21  Sinus rhythm with 1st degree A-V block  Left axis deviation  Right bundle branch block  Inferior infarct (cited on or before 29-JAN-2020)  Abnormal ECG  When compared with ECG of 29-JAN-2020 15:48,  Questionable change in initial forces of Inferior leads  Confirmed by Digna Biswas ([id number]) on 3/17/2021 7:54:32 AM    TTE 8/11/19   SUMMARY     LEFT VENTRICLE:  Normal left ventricular systolic function, EF 27%  Normal left ventricular cavity size  Moderate concentric left ventricular hypertrophy  Normal left ventricular wall motion without regional wall motion abnormalities  Normal left  ventricular diastolic function  Normal left atrial pressures      LEFT ATRIUM:  Mild left atrial enlargement      RIGHT ATRIUM:  Mild right atrial enlargement   Left atrium larger than right atrium      AORTIC VALVE:  The aortic valve is tricuspid  The non coronary cusp is heavily calcified and the left coronary cusp is mild-to-moderately calcified  All 3 leaflets have good excursion  There is no aortic stenosis or regurgitation      TRICUSPID VALVE:  There was trace regurgitation  Physical Exam    Airway    Mallampati score: III  TM Distance: >3 FB  Neck ROM: full     Dental   upper dentures and lower dentures,     Cardiovascular      Pulmonary      Other Findings        Anesthesia Plan  ASA Score- 3     Anesthesia Type- IV sedation with anesthesia with ASA Monitors  Additional Monitors:   Airway Plan:     Comment: Patient seen and evaluated  Plan Factors-Exercise tolerance (METS): >4 METS  Chart reviewed  EKG reviewed  Imaging results reviewed  Existing labs reviewed  Patient summary reviewed  Induction- intravenous  Postoperative Plan-     Informed Consent- Anesthetic plan and risks discussed with patient  I personally reviewed this patient with the CRNA  Discussed and agreed on the Anesthesia Plan with the CRNA               Lab Results   Component Value Date    HGBA1C 5 0 03/03/2021       Lab Results   Component Value Date     (L) 10/21/2015    K 3 7 03/03/2021     (H) 03/03/2021    CO2 28 03/03/2021    ANIONGAP 9 10/21/2015    BUN 8 03/03/2021    CREATININE 1 06 03/03/2021    GLUCOSE 224 (H) 10/21/2015    GLUF 88 03/03/2021    CALCIUM 9 1 03/03/2021    CORRECTEDCA 9 7 03/03/2021    AST 21 03/03/2021    ALT 33 03/03/2021    ALKPHOS 67 03/03/2021    PROT 5 9 (L) 10/19/2015    BILITOT 0 81 10/19/2015    EGFR 77 03/03/2021       Lab Results   Component Value Date    WBC 9 84 03/03/2021    HGB 14 7 03/03/2021    HCT 45 2 03/03/2021    MCV 96 03/03/2021     03/03/2021    Sinus rhythm with 1st degree A-V block  Left axis deviation  Right bundle branch block  Inferior infarct (cited on or before 29-JAN-2020)  Abnormal ECG  When compared with ECG of 29-JAN-2020 15:48,  Questionable change in initial forces of Inferior leads  Confirmed by Lb Babcock ([id number]) on 3/17/2021 7:54:32 AM    Echo 2019   LEFT VENTRICLE:  Normal left ventricular systolic function, EF 18%  Normal left ventricular cavity size  Moderate concentric left ventricular hypertrophy  Normal left ventricular wall motion without regional wall motion abnormalities  Normal left  ventricular diastolic function  Normal left atrial pressures      LEFT ATRIUM:  Mild left atrial enlargement      RIGHT ATRIUM:  Mild right atrial enlargement  Left atrium larger than right atrium      AORTIC VALVE:  The aortic valve is tricuspid  The non coronary cusp is heavily calcified and the left coronary cusp is mild-to-moderately calcified  All 3 leaflets have good excursion   There is no aortic stenosis or regurgitation      TRICUSPID VALVE:  There was trace regurgitation      PULMONARY ARTERIES:  In adequate tricuspid regurgitation to evaluate pulmonary artery systolic pressures

## 2021-09-02 NOTE — DISCHARGE INSTRUCTIONS
Upper Endoscopy   WHAT YOU NEED TO KNOW:   An upper endoscopy is also called an upper gastrointestinal (GI) endoscopy, or an esophagogastroduodenoscopy (EGD)  You may feel bloated, gassy, or have some abdominal discomfort after your procedure  Your throat may be sore for 24 to 36 hours  You may burp or pass gas from air that is still inside your body  DISCHARGE INSTRUCTIONS:   Call 911 if:   · You have sudden chest pain or trouble breathing  Seek care immediately if:   · You feel dizzy or faint  · You have trouble swallowing  · You have severe throat pain  · Your bowel movements are very dark or black  · Your abdomen is hard and firm and you have severe pain  · You vomit blood  Contact your healthcare provider if:   · You feel full or bloated and cannot burp or pass gas  · You have not had a bowel movement for 3 days after your procedure  · You have neck pain  · You have a fever or chills  · You have nausea or are vomiting  · You have a rash or hives  · You have questions or concerns about your endoscopy  Relieve a sore throat:  Suck on throat lozenges or crushed ice  Gargle with a small amount of warm salt water  Mix 1 teaspoon of salt and 1 cup of warm water to make salt water  Relieve gas and discomfort from bloating:  Lie on your right side with a heating pad on your abdomen  Take short walks to help pass gas  Eat small meals until bloating is relieved  Rest after your procedure:  Do not drive or make important decisions until the day after your procedure  Return to your normal activity as directed  You can usually return to work the day after your procedure  Follow up with your healthcare provider as directed:  Write down your questions so you remember to ask them during your visits  © Copyright [address] 2021 Information is for End User's use only and may not be sold, redistributed or otherwise used for commercial purposes   All illustrations and images included in CareNotes® are the copyrighted property of [employer]  or [employer]   The above information is an  only  It is not intended as medical advice for individual conditions or treatments  Talk to your doctor, nurse or pharmacist before following any medical regimen to see if it is safe and effective for you

## 2021-09-02 NOTE — INTERVAL H&P NOTE
H&P reviewed  After examining the patient I find no changes in the patients condition since the H&P had been written      Vitals:    09/02/21 0757   BP: 163/97   Pulse: 74   Resp: 20   Temp: 97 9 °F (36 6 °C)   SpO2: 98%

## 2021-09-02 NOTE — NURSING NOTE
Awake and alert  On his cell phone as soon as he came back from procedure  Denies pain  Respirations easy and non labored  IV fluids continue  Call bell in reach

## 2021-09-08 DIAGNOSIS — R11.0 NAUSEA: ICD-10-CM

## 2021-09-08 DIAGNOSIS — K21.9 GASTROESOPHAGEAL REFLUX DISEASE WITHOUT ESOPHAGITIS: Primary | ICD-10-CM

## 2021-09-08 RX ORDER — SUCRALFATE ORAL 1 G/10ML
1 SUSPENSION ORAL 4 TIMES DAILY
Qty: 420 ML | Refills: 3 | Status: SHIPPED | OUTPATIENT
Start: 2021-09-08

## 2022-01-21 ENCOUNTER — HOSPITAL ENCOUNTER (EMERGENCY)
Facility: HOSPITAL | Age: 62
Discharge: HOME | End: 2022-01-21
Attending: EMERGENCY MEDICINE
Payer: MEDICARE

## 2022-01-21 ENCOUNTER — APPOINTMENT (EMERGENCY)
Dept: CT IMAGING | Facility: HOSPITAL | Age: 62
End: 2022-01-21
Payer: MEDICARE

## 2022-01-21 VITALS
RESPIRATION RATE: 18 BRPM | DIASTOLIC BLOOD PRESSURE: 93 MMHG | SYSTOLIC BLOOD PRESSURE: 191 MMHG | OXYGEN SATURATION: 98 % | TEMPERATURE: 98.5 F | HEART RATE: 75 BPM
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
## 2022-01-21 DIAGNOSIS — S01.81XA FACIAL LACERATION, INITIAL ENCOUNTER: ICD-10-CM

## 2022-01-21 DIAGNOSIS — S00.83XA CONTUSION OF FACE, INITIAL ENCOUNTER: Primary | ICD-10-CM

## 2022-01-21 LAB
ANION GAP SERPL CALCULATED.3IONS-SCNC: 9 MMOL/L (ref 4–13)
BASOPHILS # BLD AUTO: 0.02 THOUSANDS/ΜL (ref 0–0.1)
BASOPHILS NFR BLD AUTO: 0 % (ref 0–1)
BUN SERPL-MCNC: 12 MG/DL (ref 5–25)
CALCIUM SERPL-MCNC: 8.4 MG/DL (ref 8.3–10.1)
CHLORIDE SERPL-SCNC: 109 MMOL/L (ref 100–108)
CO2 SERPL-SCNC: 26 MMOL/L (ref 21–32)
CREAT SERPL-MCNC: 1.06 MG/DL (ref 0.6–1.3)
EOSINOPHIL # BLD AUTO: 0.07 THOUSAND/ΜL (ref 0–0.61)
EOSINOPHIL NFR BLD AUTO: 1 % (ref 0–6)
ERYTHROCYTE [DISTWIDTH] IN BLOOD BY AUTOMATED COUNT: 12.9 % (ref 11.6–15.1)
GFR SERPL CREATININE-BSD FRML MDRD: 76 ML/MIN/1.73SQ M
GLUCOSE SERPL-MCNC: 165 MG/DL (ref 65–140)
HCT VFR BLD AUTO: 41.9 % (ref 36.5–49.3)
HGB BLD-MCNC: 13.6 G/DL (ref 12–17)
IMM GRANULOCYTES # BLD AUTO: 0.03 THOUSAND/UL (ref 0–0.2)
IMM GRANULOCYTES NFR BLD AUTO: 0 % (ref 0–2)
LYMPHOCYTES # BLD AUTO: 1.11 THOUSANDS/ΜL (ref 0.6–4.47)
LYMPHOCYTES NFR BLD AUTO: 17 % (ref 14–44)
MCH RBC QN AUTO: 30.4 PG (ref 26.8–34.3)
MCHC RBC AUTO-ENTMCNC: 32.5 G/DL (ref 31.4–37.4)
MCV RBC AUTO: 94 FL (ref 82–98)
MONOCYTES # BLD AUTO: 0.31 THOUSAND/ΜL (ref 0.17–1.22)
MONOCYTES NFR BLD AUTO: 5 % (ref 4–12)
NEUTROPHILS # BLD AUTO: 5.15 THOUSANDS/ΜL (ref 1.85–7.62)
NEUTS SEG NFR BLD AUTO: 77 % (ref 43–75)
NRBC BLD AUTO-RTO: 0 /100 WBCS
PLATELET # BLD AUTO: 142 THOUSANDS/UL (ref 149–390)
PMV BLD AUTO: 10.7 FL (ref 8.9–12.7)
POTASSIUM SERPL-SCNC: 3.7 MMOL/L (ref 3.5–5.3)
RBC # BLD AUTO: 4.47 MILLION/UL (ref 3.88–5.62)
SODIUM SERPL-SCNC: 144 MMOL/L (ref 136–145)
WBC # BLD AUTO: 6.69 THOUSAND/UL (ref 4.31–10.16)

## 2022-01-21 PROCEDURE — 96374 THER/PROPH/DIAG INJ IV PUSH: CPT

## 2022-01-21 PROCEDURE — 90471 IMMUNIZATION ADMIN: CPT

## 2022-01-21 PROCEDURE — 36415 COLL VENOUS BLD VENIPUNCTURE: CPT

## 2022-01-21 PROCEDURE — 12011 RPR F/E/E/N/L/M 2.5 CM/<: CPT | Performed by: EMERGENCY MEDICINE

## 2022-01-21 PROCEDURE — 70450 CT HEAD/BRAIN W/O DYE: CPT

## 2022-01-21 PROCEDURE — 99284 EMERGENCY DEPT VISIT MOD MDM: CPT

## 2022-01-21 PROCEDURE — 85025 COMPLETE CBC W/AUTO DIFF WBC: CPT

## 2022-01-21 PROCEDURE — 99285 EMERGENCY DEPT VISIT HI MDM: CPT | Performed by: EMERGENCY MEDICINE

## 2022-01-21 PROCEDURE — 72125 CT NECK SPINE W/O DYE: CPT

## 2022-01-21 PROCEDURE — G1004 CDSM NDSC: HCPCS

## 2022-01-21 PROCEDURE — 80048 BASIC METABOLIC PNL TOTAL CA: CPT

## 2022-01-21 PROCEDURE — 90715 TDAP VACCINE 7 YRS/> IM: CPT

## 2022-01-21 PROCEDURE — 70486 CT MAXILLOFACIAL W/O DYE: CPT

## 2022-01-21 RX ORDER — FENTANYL CITRATE 50 UG/ML
50 INJECTION, SOLUTION INTRAMUSCULAR; INTRAVENOUS ONCE
Status: COMPLETED | OUTPATIENT
Start: 2022-01-21 | End: 2022-01-21

## 2022-01-21 RX ORDER — LIDOCAINE HYDROCHLORIDE 10 MG/ML
10 INJECTION, SOLUTION EPIDURAL; INFILTRATION; INTRACAUDAL; PERINEURAL ONCE
Status: COMPLETED | OUTPATIENT
Start: 2022-01-21 | End: 2022-01-21

## 2022-01-21 RX ADMIN — TETANUS TOXOID, REDUCED DIPHTHERIA TOXOID AND ACELLULAR PERTUSSIS VACCINE, ADSORBED 0.5 ML: 5; 2.5; 8; 8; 2.5 SUSPENSION INTRAMUSCULAR at 15:54

## 2022-01-21 RX ADMIN — FENTANYL CITRATE 50 MCG: 50 INJECTION INTRAMUSCULAR; INTRAVENOUS at 14:42

## 2022-01-21 RX ADMIN — LIDOCAINE HYDROCHLORIDE 10 ML: 10 INJECTION, SOLUTION EPIDURAL; INFILTRATION; INTRACAUDAL; PERINEURAL at 15:28
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
## 2022-07-09 ENCOUNTER — HOSPITAL ENCOUNTER (EMERGENCY)
Facility: HOSPITAL | Age: 62
Discharge: HOME | End: 2022-07-09
Attending: EMERGENCY MEDICINE
Payer: MEDICARE

## 2022-07-09 ENCOUNTER — APPOINTMENT (EMERGENCY)
Dept: RADIOLOGY | Facility: HOSPITAL | Age: 62
End: 2022-07-09
Payer: MEDICARE

## 2022-07-09 VITALS
DIASTOLIC BLOOD PRESSURE: 67 MMHG | RESPIRATION RATE: 20 BRPM | HEART RATE: 76 BPM | WEIGHT: 255 LBS | SYSTOLIC BLOOD PRESSURE: 149 MMHG | HEIGHT: 72 IN | OXYGEN SATURATION: 97 % | BODY MASS INDEX: 34.54 KG/M2 | TEMPERATURE: 97.5 F

## 2022-07-09 DIAGNOSIS — I21.4 NSTEMI (NON-ST ELEVATED MYOCARDIAL INFARCTION) (HCC): Primary | ICD-10-CM

## 2022-07-09 LAB
2HR DELTA HS TROPONIN: 1329 NG/L
ALBUMIN SERPL BCP-MCNC: 3.9 G/DL (ref 3.5–5)
ALP SERPL-CCNC: 90 U/L (ref 46–116)
ALT SERPL W P-5'-P-CCNC: 27 U/L (ref 12–78)
ANION GAP SERPL CALCULATED.3IONS-SCNC: 9 MMOL/L (ref 4–13)
APTT PPP: 28 SECONDS (ref 23–37)
AST SERPL W P-5'-P-CCNC: 16 U/L (ref 5–45)
ATRIAL RATE: 78 BPM
ATRIAL RATE: 86 BPM
BASOPHILS # BLD AUTO: 0.06 THOUSANDS/ΜL (ref 0–0.1)
BASOPHILS NFR BLD AUTO: 1 % (ref 0–1)
BILIRUB SERPL-MCNC: 0.4 MG/DL (ref 0.2–1)
BUN SERPL-MCNC: 11 MG/DL (ref 5–25)
CALCIUM SERPL-MCNC: 9.2 MG/DL (ref 8.3–10.1)
CARDIAC TROPONIN I PNL SERPL HS: 114 NG/L
CARDIAC TROPONIN I PNL SERPL HS: 1443 NG/L
CHLORIDE SERPL-SCNC: 106 MMOL/L (ref 100–108)
CO2 SERPL-SCNC: 28 MMOL/L (ref 21–32)
CREAT SERPL-MCNC: 1.2 MG/DL (ref 0.6–1.3)
EOSINOPHIL # BLD AUTO: 0.23 THOUSAND/ΜL (ref 0–0.61)
EOSINOPHIL NFR BLD AUTO: 2 % (ref 0–6)
ERYTHROCYTE [DISTWIDTH] IN BLOOD BY AUTOMATED COUNT: 12.7 % (ref 11.6–15.1)
GFR SERPL CREATININE-BSD FRML MDRD: 65 ML/MIN/1.73SQ M
GLUCOSE SERPL-MCNC: 116 MG/DL (ref 65–140)
HCT VFR BLD AUTO: 48.9 % (ref 36.5–49.3)
HGB BLD-MCNC: 16 G/DL (ref 12–17)
IMM GRANULOCYTES # BLD AUTO: 0.05 THOUSAND/UL (ref 0–0.2)
IMM GRANULOCYTES NFR BLD AUTO: 1 % (ref 0–2)
INR PPP: 1.07 (ref 0.84–1.19)
LIPASE SERPL-CCNC: 72 U/L (ref 73–393)
LYMPHOCYTES # BLD AUTO: 1.91 THOUSANDS/ΜL (ref 0.6–4.47)
LYMPHOCYTES NFR BLD AUTO: 20 % (ref 14–44)
MCH RBC QN AUTO: 31.2 PG (ref 26.8–34.3)
MCHC RBC AUTO-ENTMCNC: 32.7 G/DL (ref 31.4–37.4)
MCV RBC AUTO: 95 FL (ref 82–98)
MONOCYTES # BLD AUTO: 0.73 THOUSAND/ΜL (ref 0.17–1.22)
MONOCYTES NFR BLD AUTO: 8 % (ref 4–12)
NEUTROPHILS # BLD AUTO: 6.72 THOUSANDS/ΜL (ref 1.85–7.62)
NEUTS SEG NFR BLD AUTO: 68 % (ref 43–75)
NRBC BLD AUTO-RTO: 0 /100 WBCS
NT-PROBNP SERPL-MCNC: 75 PG/ML
P AXIS: 37 DEGREES
P AXIS: 56 DEGREES
PLATELET # BLD AUTO: 210 THOUSANDS/UL (ref 149–390)
PMV BLD AUTO: 10.7 FL (ref 8.9–12.7)
POTASSIUM SERPL-SCNC: 4 MMOL/L (ref 3.5–5.3)
PR INTERVAL: 182 MS
PR INTERVAL: 198 MS
PROT SERPL-MCNC: 7.6 G/DL (ref 6.4–8.2)
PROTHROMBIN TIME: 13.8 SECONDS (ref 11.6–14.5)
QRS AXIS: -55 DEGREES
QRS AXIS: -66 DEGREES
QRSD INTERVAL: 120 MS
QRSD INTERVAL: 132 MS
QT INTERVAL: 378 MS
QT INTERVAL: 392 MS
QTC INTERVAL: 430 MS
QTC INTERVAL: 469 MS
RBC # BLD AUTO: 5.13 MILLION/UL (ref 3.88–5.62)
SODIUM SERPL-SCNC: 143 MMOL/L (ref 136–145)
T WAVE AXIS: 14 DEGREES
T WAVE AXIS: 48 DEGREES
VENTRICULAR RATE: 78 BPM
VENTRICULAR RATE: 86 BPM
WBC # BLD AUTO: 9.7 THOUSAND/UL (ref 4.31–10.16)

## 2022-07-09 PROCEDURE — 96375 TX/PRO/DX INJ NEW DRUG ADDON: CPT

## 2022-07-09 PROCEDURE — 83880 ASSAY OF NATRIURETIC PEPTIDE: CPT | Performed by: EMERGENCY MEDICINE

## 2022-07-09 PROCEDURE — 80053 COMPREHEN METABOLIC PANEL: CPT | Performed by: EMERGENCY MEDICINE

## 2022-07-09 PROCEDURE — 85730 THROMBOPLASTIN TIME PARTIAL: CPT | Performed by: EMERGENCY MEDICINE

## 2022-07-09 PROCEDURE — 99285 EMERGENCY DEPT VISIT HI MDM: CPT

## 2022-07-09 PROCEDURE — 84484 ASSAY OF TROPONIN QUANT: CPT | Performed by: EMERGENCY MEDICINE

## 2022-07-09 PROCEDURE — 93005 ELECTROCARDIOGRAM TRACING: CPT

## 2022-07-09 PROCEDURE — 96366 THER/PROPH/DIAG IV INF ADDON: CPT

## 2022-07-09 PROCEDURE — 96365 THER/PROPH/DIAG IV INF INIT: CPT

## 2022-07-09 PROCEDURE — 36415 COLL VENOUS BLD VENIPUNCTURE: CPT | Performed by: EMERGENCY MEDICINE

## 2022-07-09 PROCEDURE — 85610 PROTHROMBIN TIME: CPT | Performed by: EMERGENCY MEDICINE

## 2022-07-09 PROCEDURE — 71045 X-RAY EXAM CHEST 1 VIEW: CPT

## 2022-07-09 PROCEDURE — 93010 ELECTROCARDIOGRAM REPORT: CPT | Performed by: INTERNAL MEDICINE

## 2022-07-09 PROCEDURE — 85025 COMPLETE CBC W/AUTO DIFF WBC: CPT | Performed by: EMERGENCY MEDICINE

## 2022-07-09 PROCEDURE — 83690 ASSAY OF LIPASE: CPT | Performed by: EMERGENCY MEDICINE

## 2022-07-09 PROCEDURE — 99291 CRITICAL CARE FIRST HOUR: CPT | Performed by: EMERGENCY MEDICINE

## 2022-07-09 RX ORDER — HEPARIN SODIUM 1000 [USP'U]/ML
4000 INJECTION, SOLUTION INTRAVENOUS; SUBCUTANEOUS
Status: DISCONTINUED | OUTPATIENT
Start: 2022-07-09 | End: 2022-07-10 | Stop reason: HOSPADM

## 2022-07-09 RX ORDER — LORAZEPAM 2 MG/ML
1 INJECTION INTRAMUSCULAR ONCE
Status: COMPLETED | OUTPATIENT
Start: 2022-07-09 | End: 2022-07-09

## 2022-07-09 RX ORDER — HEPARIN SODIUM 1000 [USP'U]/ML
4000 INJECTION, SOLUTION INTRAVENOUS; SUBCUTANEOUS ONCE
Status: COMPLETED | OUTPATIENT
Start: 2022-07-09 | End: 2022-07-09

## 2022-07-09 RX ORDER — HEPARIN SODIUM 10000 [USP'U]/100ML
3-20 INJECTION, SOLUTION INTRAVENOUS
Status: DISCONTINUED | OUTPATIENT
Start: 2022-07-09 | End: 2022-07-10 | Stop reason: HOSPADM

## 2022-07-09 RX ORDER — SODIUM CHLORIDE 9 MG/ML
3 INJECTION INTRAVENOUS
Status: DISCONTINUED | OUTPATIENT
Start: 2022-07-09 | End: 2022-07-10 | Stop reason: HOSPADM

## 2022-07-09 RX ORDER — NITROGLYCERIN 20 MG/100ML
5-200 INJECTION INTRAVENOUS
Status: DISCONTINUED | OUTPATIENT
Start: 2022-07-09 | End: 2022-07-10 | Stop reason: HOSPADM

## 2022-07-09 RX ORDER — HEPARIN SODIUM 1000 [USP'U]/ML
2000 INJECTION, SOLUTION INTRAVENOUS; SUBCUTANEOUS
Status: DISCONTINUED | OUTPATIENT
Start: 2022-07-09 | End: 2022-07-10 | Stop reason: HOSPADM

## 2022-07-09 RX ADMIN — LORAZEPAM 1 MG: 2 INJECTION INTRAMUSCULAR; INTRAVENOUS at 21:25

## 2022-07-09 RX ADMIN — HEPARIN SODIUM 4000 UNITS: 1000 INJECTION, SOLUTION INTRAVENOUS; SUBCUTANEOUS at 19:43

## 2022-07-09 RX ADMIN — NITROGLYCERIN 5 MCG/MIN: 20 INJECTION INTRAVENOUS at 19:47

## 2022-07-09 RX ADMIN — HEPARIN SODIUM 11 UNITS/KG/HR: 10000 INJECTION, SOLUTION INTRAVENOUS at 19:47

## 2022-07-09 NOTE — ED PROVIDER NOTES
History  Chief Complaint   Patient presents with    Chest Pain     Hx of 5 heart attacks per patient  Presents today with mid sternum chest pain 9/10 after 3 nitro  324 ASA given by EMS  Patient states he takes nitro daily d/t angina  62 yo M with Hx of anxiety, cerebral aneurysm s/p coiling, CAD status post multiple stents, chronic diastolic congestive heart, CKD, diabetes, brain aneurysm status post coiling, COPD, gastric sleeve surgery in 2018, Alzheimer's disease, ARLEEN and hypercholesterolemia complains of chest pain that feels the patient like when the coronary artery clogs up, but not like one of his heart attacks  He states he gets this anginal pain every time he gets emotionally upset or exerts himself  If he moves long-term within 5 minutes she gets chest pain  This happened several times per week  He takes 2 nitroglycerines at the same time and after an hour rest his pain goes away  Today he was detained by the police and put in the police car  Initially this said they were going to arrest him for a warrant but then they said they were mistaken in regard let him go  That triggered his angina  He was given nitroglycerin sublingual every 5 minutes x3 and got aspirin prior to arrival but says the chest pressure is still there  Denies dyspnea, palpitations, syncope and any other pain other than his chronic low back pain  I spoke to his wife by phone and she confirms his story  Initial EKG is unchanged  Will do a chest pain workup but patient does appear comfortable to me and this is atypical for ACS  Prior to Admission Medications   Prescriptions Last Dose Informant Patient Reported? Taking?    ARIPiprazole (ABILIFY) 5 mg tablet   Yes No   Sig: Take 10 mg by mouth daily    CALCIUM PO  Self Yes No   Sig: Take 1 tablet by mouth daily   Cholecalciferol (VITAMIN D3) 5000 units CAPS  Self No No   Sig: Take 1 capsule (5,000 Units total) by mouth daily   Patient not taking: Reported on 2021   Cyanocobalamin (VITAMIN B-12 PO)  Self Yes No   Sig: Take 1 tablet by mouth daily   Morphine Sulfate Microinfusion (MORPHINE SULF MICROINFUSION PF IJ)  Self Yes No   Sig: Inject 14 mg as directed daily Via infusion pump   POTASSIUM PO  Self Yes No   Sig: Take 40 mEq by mouth 2 (two) times a day   Pediatric Multivit-Minerals-C (Polyvitamin/Iron) CHEW   Yes No   Sig: Chew 1 tablet daily   ULTICARE SHORT PEN NEEDLES 31G X 8 MM MISC  Self Yes No   Si INJECTIONS PER DAY DX  E11 8   albuterol (ACCUNEB) 1 25 MG/3ML nebulizer solution  Self Yes No   Sig: Take 1 ampule by nebulization every 6 (six) hours as needed for wheezing   atorvastatin (LIPITOR) 40 mg tablet  Self Yes No   Sig: Take 40 mg by mouth daily  baclofen 10 mg tablet  Self Yes No   Sig: Take 10 mg by mouth 3 (three) times a day   clopidogrel (PLAVIX) 75 mg tablet   Yes No   Sig: Take 75 mg by mouth daily   ergocalciferol (VITAMIN D2) 50,000 units  Self Yes No   Sig: Take 50,000 Units by mouth once a week   fludrocortisone (FLORINEF) 0 1 mg tablet  Self No No   Sig: Take 1 tablet (0 1 mg total) by mouth daily   folic acid (FOLVITE) 1 mg tablet  Self Yes No   Sig: Take 1 mg by mouth daily    gabapentin (NEURONTIN) 300 mg capsule  Self No No   Sig: TAKE 1 CAPSULE (300 MG TOTAL) BY MOUTH AS NEEDED (MIGRAINE)   gabapentin (NEURONTIN) 600 MG tablet  Self Yes No   Sig: Take 600 mg by mouth 3 (three) times a day   hydrOXYzine HCL (ATARAX) 25 mg tablet  Self Yes No   Sig: Take 75 mg by mouth 2 (two) times a day as needed for anxiety    hydrocortisone 1 % lotion   Yes No   Sig: APPLY TOPICALLY 2 (TWO) TIMES A DAY INDICATIONS  USING ON FEET  memantine (NAMENDA) 10 mg tablet   Yes No   Sig: TAKE 1 TABLET (10 MG TOTAL) BY MOUTH 2 (TWO) TIMES A DAY     methocarbamol (ROBAXIN) 750 mg tablet   No No   Sig: Take 1 tablet (750 mg total) by mouth every 6 (six) hours as needed for muscle spasms   nitroglycerin (NITROSTAT) 0 4 mg SL tablet  Self Yes No   Sig: Place 0 4 mg under the tongue every 5 (five) minutes as needed for chest pain (pt has not  used recently)  nystatin (MYCOSTATIN) cream  Self Yes No   Sig: Apply 1 application topically 2 (two) times a day   omeprazole (PriLOSEC) 40 MG capsule  Self Yes No   Sig: Take 40 mg by mouth daily   ondansetron (ZOFRAN) 4 mg tablet   No No   Sig: Take 1 tablet (4 mg total) by mouth every 8 (eight) hours as needed for nausea or vomiting   oxyCODONE-acetaminophen (PERCOCET) 5-325 mg per tablet   No No   Sig: Take 1 tablet by mouth every 4 (four) hours as needed for severe painMax Daily Amount: 6 tablets   Patient not taking: Reported on 5/12/2021   prochlorperazine (COMPAZINE) 10 mg tablet  Self No No   Sig: Take 1 tablet (10 mg total) by mouth every 6 (six) hours as needed for nausea or vomiting (migraine)   sertraline (ZOLOFT) 100 mg tablet  Self No No   Sig: Take 1 5 tablets (150 mg total) by mouth daily   Patient not taking: Reported on 8/11/2021   sucralfate (CARAFATE) 1 g/10 mL suspension   No No   Sig: Take 10 mL (1 g total) by mouth 4 (four) times a day   tamsulosin (FLOMAX) 0 4 mg  Self Yes No   Sig: Take 0 4 mg by mouth daily with dinner     traZODone (DESYREL) 100 mg tablet  Self No No   Sig: Take 2 tablets (200 mg total) by mouth daily at bedtime as needed for sleep   Patient not taking: Reported on 8/11/2021      Facility-Administered Medications: None       Past Medical History:   Diagnosis Date    Acute on chronic diastolic congestive heart failure (HCC)     Altered gait     Alzheimer disease ([facility] [address] [facility]     per patients,,early onset     Angina pectoris ([facility] [address] [facility]     Anxiety     Arthritis     Brain aneurysm     coils placed    Cardiac disease     Chest pain 1/13/2016    Chronic kidney disease     Chronic pain     back/ right groin and rle- has morphine pump    Constipation     COPD (chronic obstructive pulmonary disease) (HCC)     Coronary artery disease     CPAP (continuous positive airway pressure) dependence     Decubital ulcer     sacral decub-occured 5/2019-sees wound care/debide in OR today 6/6/2019    Dependent on walker for ambulation     w/c for long distance    Depression     Diabetes mellitus (HCC)     insulin dependent    Dizziness     occ    Dysphagia     Enlarged prostate     Fall     GERD (gastroesophageal reflux disease)     [facility] failure [facility] [address] [facility]     Hiatal hernia     Hx of gastric bypass 11/19/2018    Hypercholesterolemia     Hypertension     MI (myocardial infarction) ([facility] [address] )     2017- stents x2    Migraine     Morbid obesity ([facility] [address] )     gastric bypass sleeve 11/2018-wt loss 125 lb    Neuropathy     Oxygen dependent     Q HS  2LPM with CPAP and prn during day 2-3 LPM     Pressure injury of skin     Renal disorder     Shortness of breath     Skin abnormality     sacral wound - covered with pad    Sleep apnea     Stented coronary artery     [facility]     vision loss b/l  2005, residual R leg weakness    Urinary frequency     Use of cane as ambulatory aid     Wears dentures     Wears glasses     Wears glasses     Wheelchair dependent        Past Surgical History:   Procedure Laterality Date    BACK SURGERY      BRAIN SURGERY      CARDIAC CATHETERIZATION      with stents    CEREBRAL ANEURYSM REPAIR      with coils    COLONOSCOPY      ESOPHAGOGASTRODUODENOSCOPY N/A 7/1/2016    Procedure: ESOPHAGOGASTRODUODENOSCOPY (EGD); Surgeon: Bubba Perez MD;  [facility]: [facility];   Service:     GASTRIC BYPASS  11/19/2018    HERNIA REPAIR      HIATAL HERNIA REPAIR      INFUSION PUMP IMPLANTATION Left     morphine    INTRATHECAL PUMP IMPLANTATION Left 7/9/2020    Procedure: REVISION INTRATHECAL PAIN PUMP POCKET, LEFT ABDOMEN;  Surgeon: Eliel Sanchez MD;  [facility];  Service: Neurosurgery    KNEE ARTHROSCOPY Right     KNEE ARTHROSCOPY Right     PERONEAL NERVE DECOMPRESSION Right     MT DEBRIDEMENT OPEN WOUND 20 SQ CM< N/A 6/6/2019    Procedure: EXCISIONAL DEBRIDEMENT OF SACRAL DECUBITUS ULCER;  Surgeon: Vesta Kawasaki, MD;  [facility];  Service: General    NJ ESOPHAGOGASTRODUODENOSCOPY TRANSORAL DIAGNOSTIC N/A 2/27/2017    Procedure: ESOPHAGOGASTRODUODENOSCOPY (EGD); Surgeon: Owen Hitchcock MD;  [facility]; Service: Gastroenterology    NJ ESOPHAGOGASTRODUODENOSCOPY TRANSORAL DIAGNOSTIC N/A 8/23/2018    Procedure: ESOPHAGOGASTRODUODENOSCOPY (EGD) with biopsy;  Surgeon: Owen Hitchcock MD;  [facility]; Service: Gastroenterology    NJ INSERT SPINE INFUSN [facility] N/A 1/19/2017    Procedure: REMOVAL / Avonne Bradley;  Surgeon: Carlos Manuel Soriano MD;  [facility];  Service: Orthopedics    NJ INSERT SPINE INFUSN [facility] N/A 5/16/2016    Procedure: REPLACEMENT AND PROGRAM PUMP ;  Surgeon: Carlos Manuel Soriano MD;  [facility];  Service: Orthopedics    NJ INSERT/ REPLACE INFUSN PUMP,PROGRAMMABLE Left 10/13/2020    Procedure: EXPLORATION OF INTRATHECAL PAIN PUMP SYSTEM INTEGRITY FOR POSSIBLE REPLACEMENT OF CATHETER AND PUMP ;  Surgeon: Terry Smith MD;  [facility];  Service: Neurosurgery    NJ PERCUT IMPLNT Lakisha Steve Right 3/17/2021    Procedure: INSERTION THORACIC DORSAL COLUMN SPINAL CORD STIMULATOR PERCUTANEOUS W IMPLANTABLE PULSE GENERATOR, RIGHT;  Surgeon: Terry Smith MD;  [facility];  Service: Neurosurgery       Family History   Problem Relation Age of Onset    Diabetes unspecified Mother     Diabetes unspecified Brother     Diabetes unspecified Paternal Uncle     Diabetes unspecified Maternal Grandmother     Diabetes unspecified Paternal Grandmother     Diabetes unspecified Brother     Heart attack Father      I have reviewed and agree with the history as documented      E-Cigarette/Vaping    E-Cigarette Use Never User      E-Cigarette/Vaping Substances    Nicotine No     THC No     CBD No     Flavoring No     Other No     Unknown No      Social History Tobacco Use    Smoking status: Never Smoker    Smokeless tobacco: Never Used   Vaping Use    Vaping Use: Never used   Substance Use Topics    Alcohol use: Not Currently    Drug use: Not Currently     Types: Marijuana       Review of Systems   Constitutional: Negative for fatigue and fever  Respiratory: Negative for cough and shortness of breath  Cardiovascular: Positive for chest pain ( frequent, several times per week)  Negative for palpitations and leg swelling  Gastrointestinal: Negative for abdominal pain, blood in stool, diarrhea and vomiting  Genitourinary: Negative for difficulty urinating, dysuria and flank pain  Musculoskeletal: Positive for back pain and gait problem  Skin: Negative for rash  Neurological: Positive for numbness (Peripheral neuropathy)  Negative for syncope  Psychiatric/Behavioral: The patient is nervous/anxious  Poor memory   All other systems reviewed and are negative  Physical Exam  Physical Exam  Vitals and nursing note reviewed  Constitutional:       General: He is not in acute distress  Appearance: He is well-developed  He is obese  He is not ill-appearing or diaphoretic  HENT:      Head: Normocephalic and atraumatic  Right Ear: External ear normal       Left Ear: External ear normal    Eyes:      Conjunctiva/sclera: Conjunctivae normal       Pupils: Pupils are equal, round, and reactive to light  Neck:      Vascular: No JVD  Cardiovascular:      Rate and Rhythm: Normal rate and regular rhythm  Pulses:           Radial pulses are 3+ on the right side and 3+ on the left side  Heart sounds: Normal heart sounds  No murmur heard  Pulmonary:      Effort: Pulmonary effort is normal       Breath sounds: Normal breath sounds  Chest:      Chest wall: No deformity or tenderness  Abdominal:      General: Bowel sounds are normal       Palpations: Abdomen is soft  There is no fluid wave or mass  Tenderness:  There is no guarding or rebound  Musculoskeletal:         General: No tenderness  Normal range of motion  Cervical back: Normal range of motion and neck supple  Right lower leg: No tenderness  No edema  Left lower leg: No tenderness  No edema  Lymphadenopathy:      Cervical: No cervical adenopathy  Skin:     General: Skin is warm and dry  Capillary Refill: Capillary refill takes less than 2 seconds  Findings: No rash  Neurological:      General: No focal deficit present  Mental Status: He is alert and oriented to person, place, and time  Cranial Nerves: No cranial nerve deficit  Motor: No weakness  Coordination: Coordination normal       Deep Tendon Reflexes: Reflexes are normal and symmetric     Psychiatric:         Mood and Affect: Mood normal          Behavior: Behavior normal          Vital Signs  ED Triage Vitals [07/09/22 1804]   Temperature Pulse Respirations Blood Pressure SpO2   97 5 °F (36 4 °C) 87 18 153/87 97 %      Temp Source Heart Rate Source Patient Position - Orthostatic VS BP Location FiO2 (%)   Temporal Monitor Sitting Right arm --      Pain Score       9           Vitals:    07/09/22 1943 07/09/22 2024 07/09/22 2045 07/09/22 2126   BP: 124/75 139/72 162/85 149/67   Pulse: 74   76   Patient Position - Orthostatic VS: Sitting Sitting Sitting Sitting         Visual Acuity      ED Medications  Medications   sodium chloride (PF) 0 9 % injection 3 mL (has no administration in time range)   nitroGLYcerin (TRIDIL) 50 mg in 250 mL infusion (premix) (50 mcg/min Intravenous Rate/Dose Change 7/9/22 2203)   heparin (porcine) 25,000 units in 0 45% NaCl 250 mL infusion (premix) (11 Units/kg/hr × 90 kg (Order-Specific) Intravenous New Bag 7/9/22 1947)   heparin (porcine) injection 4,000 Units (has no administration in time range)   heparin (porcine) injection 2,000 Units (has no administration in time range)   ticagrelor (BRILINTA) tablet 180 mg (has no administration in time range)   heparin (porcine) injection 4,000 Units (4,000 Units Intravenous Given 7/9/22 1943)   LORazepam (ATIVAN) injection 1 mg (1 mg Intravenous Given 7/9/22 2125)       Diagnostic Studies  Results Reviewed     Procedure Component Value Units Date/Time    HS Troponin I 2hr [[id number]]  (Abnormal) Collected: 07/09/22 2033    Lab Status: Final result Specimen: Blood from Arm, Right Updated: 07/09/22 2109     hs TnI 2hr 1,443 ng/L      Delta 2hr hsTnI 1,329 ng/L     APTT [[id number]]  (Normal) Collected: 07/09/22 1941    Lab Status: Final result Specimen: Blood from Arm, Right Updated: 07/09/22 2019     PTT 28 seconds     Protime-INR [[id number]]  (Normal) Collected: 07/09/22 1941    Lab Status: Final result Specimen: Blood from Arm, Right Updated: 07/09/22 2019     Protime 13 8 seconds      INR 1 07    HS Troponin I 4hr [[id number]]     Lab Status: No result Specimen: Blood     NT-BNP PRO [[id number]]  (Normal) Collected: 07/09/22 1820    Lab Status: Final result Specimen: Blood from Arm, Left Updated: 07/09/22 1853     NT-proBNP 75 pg/mL     Comprehensive metabolic panel [[id number]] Collected: 07/09/22 1820    Lab Status: Final result Specimen: Blood from Arm, Left Updated: 07/09/22 1853     Sodium 143 mmol/L      Potassium 4 0 mmol/L      Chloride 106 mmol/L      CO2 28 mmol/L      ANION GAP 9 mmol/L      BUN 11 mg/dL      Creatinine 1 20 mg/dL      Glucose 116 mg/dL      Calcium 9 2 mg/dL      AST 16 U/L      ALT 27 U/L      Alkaline Phosphatase 90 U/L      Total Protein 7 6 g/dL      Albumin 3 9 g/dL      Total Bilirubin 0 40 mg/dL      eGFR 65 ml/min/1 73sq m     Narrative:      Danny guidelines for Chronic Kidney Disease (CKD):     Stage 1 with normal or high GFR (GFR > 90 mL/min/1 73 square meters)    Stage 2 Mild CKD (GFR = 60-89 mL/min/1 73 square meters)    Stage 3A Moderate CKD (GFR = 45-59 mL/min/1 73 square meters)    Stage 3B Moderate CKD (GFR = 30-44 mL/min/1 73 square meters)    Stage 4 Severe CKD (GFR = 15-29 mL/min/1 73 square meters)    Stage 5 End Stage CKD (GFR <15 mL/min/1 73 square meters)  Note: GFR calculation is accurate only with a steady state creatinine    Lipase [[id number]]  (Abnormal) Collected: 07/09/22 1820    Lab Status: Final result Specimen: Blood from Arm, Left Updated: 07/09/22 1853     Lipase 72 u/L     HS Troponin 0hr (reflex protocol) [[id number]]  (Abnormal) Collected: 07/09/22 1820    Lab Status: Final result Specimen: Blood from Arm, Left Updated: 07/09/22 1850     hs TnI 0hr 114 ng/L     CBC and differential [[id number]] Collected: 07/09/22 1820    Lab Status: Final result Specimen: Blood from Arm, Left Updated: 07/09/22 1826     WBC 9 70 Thousand/uL      RBC 5 13 Million/uL      Hemoglobin 16 0 g/dL      Hematocrit 48 9 %      MCV 95 fL      MCH 31 2 pg      MCHC 32 7 g/dL      RDW 12 7 %      MPV 10 7 fL      Platelets 084 Thousands/uL      nRBC 0 /100 WBCs      Neutrophils Relative 68 %      Immat GRANS % 1 %      Lymphocytes Relative 20 %      Monocytes Relative 8 %      Eosinophils Relative 2 %      Basophils Relative 1 %      Neutrophils Absolute 6 72 Thousands/µL      Immature Grans Absolute 0 05 Thousand/uL      Lymphocytes Absolute 1 91 Thousands/µL      Monocytes Absolute 0 73 Thousand/µL      Eosinophils Absolute 0 23 Thousand/µL      Basophils Absolute 0 06 Thousands/µL                  X-ray chest 1 view portable   ED Interpretation by Randolph Sawyer DO (07/09 2025)   Unremarkable                 Procedures  ECG 12 Lead Documentation Only    Date/Time: 7/9/2022 6:17 PM  Performed by: Randolph Sawyer DO  Authorized by: Randolph Sawyer DO     ECG reviewed by me, the ED Provider: yes    Patient location:  ED  Previous ECG:     Previous ECG:  Unavailable    Comparison to cardiac monitor: Yes    Rate:     ECG rate assessment: normal    Rhythm:     Rhythm: sinus rhythm    Ectopy:     Ectopy: none    QRS:     QRS axis:  Left    QRS intervals: Wide  Conduction:     Conduction: abnormal      Abnormal conduction: complete RBBB and LAFB    ST segments:     ST segments:  Normal  T waves:     T waves: normal    Q waves:     Q waves:  II, III, aVF and V6    CriticalCare Time  Performed by: Randolph Sawyer DO  Authorized by: Randolph Sawyer DO     Critical care provider statement:     Critical care time (minutes):  80    Critical care time was exclusive of:  Separately billable procedures and treating other patients and teaching time    Critical care was necessary to treat or prevent imminent or life-threatening deterioration of the following conditions:  Cardiac failure    Critical care was time spent personally by me on the following activities:  Obtaining history from patient or surrogate, development of treatment plan with patient or surrogate, discussions with consultants, evaluation of patient's response to treatment, examination of patient, ordering and review of laboratory studies, ordering and review of radiographic studies, re-evaluation of patient's condition and review of old charts    I assumed direction of critical care for this patient from another provider in my specialty: no               ED Course  ED Course as of 07/09/22 2244   Sat Jul 09, 2022 1928 Discussed with on-call Cardiology, Allyssa Cifuentes, who requests IV nitro, IV heparin and wait for delta troponin  1936 Repeat EKG at 7:18 p m  shows sinus rhythm, left axis deviation, right bundle-branch block but compared to prior EKG there are now new T-wave inversions in leads 3, AVF V4, V5 V6    2121 Delta troponin is over 1300  Still with chest pain but improved with nitroglycerin  EKG unchanged from prior  Patient extremely anxious  Requested transfer to [address] [place] [address] in [facility]  Patient does not want any other Center'd  Patient extremely anxious  Will give Ativan  2138 Spoke to PACS - Sergey Hicks - and cardiology at [address], Dr Bernard Logan    Will arrange transportation to [address] [employer] after discussion with receiving attending  2237 Notified that patient eloped  His IV nitroglycerin and IV heparin lines and Angiocaths were removed by the patient without our knowledge  The emergency department was searched as was the waiting room and the immediate outside area  We could not find the patient  He had already signed the EMTALA transfer consent  All his questions had been answered  He was told that we would let him know when his transport would be available  I called his wife and left a message on the answering machine that he eloped and is currently having a heart attack  I explained that he could have a sudden cardiac death at any time and that if she finds him she should bring him directly back here or to [place] [address] Emergency Department or [facility] emergency department if they preferred  SBIRT 20yo+    Flowsheet Row Most Recent Value   SBIRT (25 yo +)    In order to provide better care to our patients, we are screening all of our patients for alcohol and drug use  Would it be okay to ask you these screening questions? No Filed at: 07/09/2022 1808                    MDM  Number of Diagnoses or Management Options  NSTEMI (non-ST elevated myocardial infarction) [facility]): new and requires workup  Diagnosis management comments: Atypical chest pain in patient with known cardiovascular disease  Check EKG, labs including serial troponins  Hemodynamically stable  Does not appear to be in too much distress  Patient elevated troponin and large delta troponin  Second EKG shows T-wave inversions inferolaterally  Discussed with Cardiology at [place] [address], patient's preferred hospital   Arrange transfer through the transfer center and spoke to the accepting physician at [place] [address]  After patient signed the EMTALA transfer consent he eloped from the department    I left a message with his wife about his serious condition  Amount and/or Complexity of Data Reviewed  Clinical lab tests: ordered and reviewed  Tests in the radiology section of CPT®: ordered and reviewed  Review and summarize past medical records: yes  Discuss the patient with other providers: yes  Independent visualization of images, tracings, or specimens: yes        Disposition  Final diagnoses:   NSTEMI (non-ST elevated myocardial infarction) ([facility] [address] )     Time reflects when diagnosis was documented in both MDM as applicable and the Disposition within this note     Time User Action Codes Description Comment    7/9/2022  9:57 PM Tina Lewis Add [I21 4] NSTEMI (non-ST elevated myocardial infarction) [facility])       ED Disposition     ED Disposition   Transfer to Another Facility-In Network    Condition   --    Date/Time   Sat Jul 9, 2022  9:57 PM    Comment   Artis Urena should be transferred out to [facility]             MD Documentation    [address] Most Recent Value   Patient Condition The patient has been stabilized such that within reasonable medical probability, no material deterioration of the patient condition or the condition of the unborn child(jc) is likely to result from the transfer   Reason for Transfer Level of Care needed not available at this facility   Benefits of Transfer Specialized equipment and/or services available at the receiving facility (Include comment)________________________  [facility]]   Risks of Transfer Potential for delay in receiving treatment, Potential deterioration of medical condition, Loss of IV, Increased discomfort during transfer, Possible worsening of condition or death during transfer   Accepting Physician [address] Name, [address]    (Name & Tel number) Amie   Transported by (Company and Unit #) [place]   Sending MD [facility]   Provider Certification General risk, such as traffic hazards, adverse weather conditions, rough terrain or turbulence, possible failure of equipment (including vehicle or aircraft), or consequences of actions of persons outside the control of the transport personnel, Unanticipated needs of medical equipment and personnel during transport, Risk of worsening condition      RN Documentation    72 Lucy Lamb Name, [address] [facility]    (Name & Tel number) Leila Smith by (Company and Unit #) [employer]      Follow-up Information    None         Patient's Medications   Discharge Prescriptions    No medications on file       No discharge procedures on file      PDMP Review       Value Time User    PDMP Reviewed  Yes 10/13/2020  1:12 PM Kia De La Fuente PA-C          ED Provider  Electronically Signed by           Elvis Szymanski DO  07/09/22 9232

## 2022-07-10 LAB
ATRIAL RATE: 74 BPM
P AXIS: -8 DEGREES
PR INTERVAL: 188 MS
QRS AXIS: -56 DEGREES
QRSD INTERVAL: 120 MS
QT INTERVAL: 382 MS
QTC INTERVAL: 424 MS
T WAVE AXIS: 20 DEGREES
VENTRICULAR RATE: 74 BPM

## 2022-07-10 PROCEDURE — 93010 ELECTROCARDIOGRAM REPORT: CPT | Performed by: INTERNAL MEDICINE

## 2022-07-10 NOTE — EMTALA/ACUTE CARE TRANSFER
[facility] EMERGENCY DEPARTMENT  [address]  [facility] [address]  Dept: [phone]      EMTALA TRANSFER CONSENT    NAME Roby Sanchez                                         1960                              MRN [id number]    I have been informed of my rights regarding examination, treatment, and transfer   by Dr Husam Nettles DO    Benefits: Specialized equipment and/or services available at the receiving facility (Include comment)________________________ (Cardiac Cath Lab)    Risks: Potential for delay in receiving treatment, Potential deterioration of medical condition, Loss of IV, Increased discomfort during transfer, Possible worsening of condition or death during transfer      Consent for Transfer:  I acknowledge that my medical condition has been evaluated and explained to me by the emergency department physician or other qualified medical person and/or my attending physician, who has recommended that I be transferred to the service of  Accepting Physician: Dionne John at [address] [place] [address] Name, [address] : Jim  The above potential benefits of such transfer, the potential risks associated with such transfer, and the probable risks of not being transferred have been explained to me, and I fully understand them  The doctor has explained that, in my case, the benefits of transfer outweigh the risks  I agree to be transferred  I authorize the performance of emergency medical procedures and treatments upon me in both transit and upon arrival at the receiving facility  Additionally, I authorize the release of any and all medical records to the receiving facility and request they be transported with me, if possible  I understand that the safest mode of transportation during a medical emergency is an ambulance and that the Hospital advocates the use of this mode of transport   Risks of traveling to the receiving facility by car, including absence of medical control, life sustaining equipment, such as oxygen, and medical personnel has been explained to me and I fully understand them  (YOUNG CORRECT BOX BELOW)  [X]  I consent to the stated transfer and to be transported by ambulance/helicopter  [  ]  I consent to the stated transfer, but refuse transportation by ambulance and accept full responsibility for my transportation by car  I understand the risks of non-ambulance transfers and I exonerate the Hospital and its staff from any deterioration in my condition that results from this refusal     X___________________________________________    DATE  22  TIME________  Signature of patient or legally responsible individual signing on patient behalf           RELATIONSHIP TO PATIENT_________________________          Provider Certification    NAME Yojana Dover                                         1960                              MRN [id number]    A medical screening exam was performed on the above named patient  Based on the examination:    Condition Necessitating Transfer The encounter diagnosis was NSTEMI (non-ST elevated myocardial infarction) ([facility] [address] )      Patient Condition: The patient has been stabilized such that within reasonable medical probability, no material deterioration of the patient condition or the condition of the unborn child(jc) is likely to result from the transfer    Reason for Transfer: Level of Care needed not available at this facility    Transfer Requirements: [address]   · Space available and qualified personnel available for treatment as acknowledged by Sreedhar Pruett  · Agreed to accept transfer and to provide appropriate medical treatment as acknowledged by       [facility]  · Appropriate medical records of the examination and treatment of the patient are provided at the time of transfer   [address] _______  · Transfer will be performed by qualified personnel from [facility]  and appropriate transfer equipment as required, including the use of necessary and appropriate life support measures  Provider Certification: I have examined the patient and explained the following risks and benefits of being transferred/refusing transfer to the patient/family:  General risk, such as traffic hazards, adverse weather conditions, rough terrain or turbulence, possible failure of equipment (including vehicle or aircraft), or consequences of actions of persons outside the control of the transport personnel, Unanticipated needs of medical equipment and personnel during transport, Risk of worsening condition      Based on these reasonable risks and benefits to the patient and/or the unborn child(jc), and based upon the information available at the time of the patients examination, I certify that the medical benefits reasonably to be expected from the provision of appropriate medical treatments at another medical facility outweigh the increasing risks, if any, to the individuals medical condition, and in the case of labor to the unborn child, from effecting the transfer      X____________________________________________ DATE 07/09/22        TIME_______      ORIGINAL - SEND TO MEDICAL RECORDS   COPY - SEND WITH PATIENT DURING TRANSFER

## 2022-07-10 NOTE — ED NOTES
3300 Went into patient's room to assess chest pain and adjust MAR medications  At this time, patient is no longer in the room  There are 2 IV's on the patient's bed, blood and all patient's belongings are missing  At this time, patient has eloped  Provider is aware        Rosalie Bill RN  07/09/22 4814

## 2022-07-15 LAB
ATRIAL RATE: 87 BPM
P AXIS: 0 DEGREES
PR INTERVAL: 182 MS
QRS AXIS: -70 DEGREES
QRSD INTERVAL: 126 MS
QT INTERVAL: 396 MS
QTC INTERVAL: 476 MS
T WAVE AXIS: 42 DEGREES
VENTRICULAR RATE: 87 BPM

## 2022-07-15 PROCEDURE — 93010 ELECTROCARDIOGRAM REPORT: CPT | Performed by: INTERNAL MEDICINE

## 2022-10-12 PROBLEM — H10.021 PINK EYE DISEASE OF RIGHT EYE: Status: RESOLVED | Noted: 2021-05-12 | Resolved: 2022-10-12

## 2023-01-25 ENCOUNTER — DOCUMENTATION (OUTPATIENT)
Dept: CARDIOLOGY CLINIC | Facility: CLINIC | Age: 63
End: 2023-01-25

## 2023-02-17 ENCOUNTER — DOCUMENTATION (OUTPATIENT)
Dept: CARDIOLOGY CLINIC | Facility: CLINIC | Age: 63
End: 2023-02-17

## 2023-02-22 NOTE — PROGRESS NOTES
Assessment/Plan:     Diagnoses and all orders for this visit:    Gastroesophageal reflux disease without esophagitis  Esophageal dysphagia  Nausea  Patient has a history of GERD, nausea, dysphagia status post sleeve gastrectomy  He has been seen multiple times in the past for these symptoms  He has been using Compazine which was effective but no longer so  He does take omeprazole daily  His last endoscopy was in 2019 where he required dilation  It is possible that this is causing a recurrence of his symptoms  Would recommend a trial of Zofran as he states he is not taking this in the past   Would also recommend endoscopy for further evaluation   -     EGD; Future  -     ondansetron (ZOFRAN) 4 mg tablet; Take 1 tablet (4 mg total) by mouth every 8 (eight) hours as needed for nausea or vomiting    Other constipation  Patient has a history of constipation likely secondary to opioid use  He was previously recommended to take MiraLax which she had stopped  He is only having 2 bowel movements per week  Would recommend resuming MiraLax to help improve bowel function which may also help improve his upper GI symptoms  Will see him back after to discuss all results  Subjective:      Patient ID: Jose Garica is a 64 y o  male  HPI     This is a follow-up for nausea, vomiting, dysphagia and constipation  Patient has a past medical history of diabetes, COPD, morphine pump due to chronic back pain, CAD status post multiple stents, on Plavix, history of gastric sleeve  Patient is complaining of nausea, vomiting and dysphagia which has been intermittent ever since his gastric sleeve in 2018  He was last seen by us in 2019 when he was admitted for similar symptoms  He does take omeprazole 40 milligrams daily  He does use Compazine as needed for nausea but states that this has been less effective recently  He does admit to dysphagia, specifically with meat    He did have an endoscopy in 2019 at This note was copied from a baby's chart. Lactation Progress Note      Data:    Initial consult on DOS for primip with an infant born at 40.2 weeks gestation. RN calling for consult for first feed. MOB has a history of PCOS. Action:    Introduced self to patient as lactation, name and phone number written on white board in room. Educated MOB about football & cross cradle positions & how to support infants head for a FAHAD. MOB desired to try football. Assisted to get infant into position at left breast. Infant was able to get a FAHAD & SRS was noted. Infant was a little on & off the breast as she has been a little spitty since delivery. Infant fed for 20 minutes. Reviewed with mother what to expect over the next  24-48 hours with infant feedings, infant output, how to know infant is getting enough, the importance of a deep latch and how to achieve it, how to break suction and try again if latch is shallow, normal  behavior, how to wake a sleepy infant to feed, how the breasts work to make milk, protecting milk supply, breastfeeding recommendations for exclusivity and duration, what to expect with cluster feeding, and breast care. Educated mother on how to hand express colostrum. Reviewed infant feeding cues and encouraged mother to allow infant to breast feed on demand anytime feeding cues are shown and if no feeding cues are shown to attempt to wake infant to feed every 2-3 hours. If infant is still too sleepy to latch to hand express colostrum into infants mouth for about ten minutes, then try again in 2-3 hours. After the first day of life to breast feed a minimum of 8-12 times a day per 24 hour period. Also encouraged mother to avoid giving infant a pacifier, bottle, or pump for at least the first two weeks of life or until breast feeding is well established. Encouraged good hydration, nutrition, and rest, and to keep taking prenatal vitamin while lactating.  Encouraged much skin to skin [facility] which showed the sleeve appeared dilated in the fundus with significant narrowing as a transition into the sleeve, retained food was seen, dilation was performed  He also complains of constipation  He states he typically has 2 bowel movements per week  He is not taking any medication for his constipation  He states he had a colonoscopy 2 years ago      Patient Active Problem List   Diagnosis    Type 2 diabetes mellitus with renal complication ([facility])    [facility] failure [facility]    Coronary artery disease involving native coronary artery    Morbid obesity ([facility] )    CVA (cerebral vascular accident) ([facility] )    Stroke ([facility] )    Stented coronary artery    Obstructive sleep apnea syndrome    CPAP (continuous positive airway pressure) dependence    Brain aneurysm    Alzheimer disease ([facility] )    Chronic pain disorder    Other constipation    Coronary artery disease    Sleep apnea    Bilateral leg edema    Chronic respiratory failure ([facility])    Stage 3 chronic kidney disease ([facility])    GERD (gastroesophageal reflux disease)    Opioid dependence, continuous ([facility])    Esophageal dysphagia    Chronic migraine without aura without status migrainosus, not intractable    Hyperlipidemia    Vitamin D deficiency    Fall at home    Pain and swelling of left knee    Pressure injury of skin of sacral region    Symptomatic bradycardia    Bilateral foot pain    Orthostatic hypotension    Primary osteoarthritis of both knees    Type 2 diabetes mellitus with diabetic neuropathy, with long-term current use of insulin ([facility])    Mild cognitive impairment    COPD (chronic obstructive pulmonary disease) ([facility])    Major depressive disorder, recurrent, moderate ([facility])    Radiculopathy, lumbosacral region    Neuropathy    Presence of intrathecal pump    Malfunction of intrathecal infusion pump    Preoperative examination    Aftercare following surgery    Skin inflammation    Status between mother and infant and father and infant. Breast feeding log reviewed, all questions answered. Mother instructed to call lactation for F/U care as needed. Response:    MOB pleased with first feed, verbalized an understanding of education provided, and will call for assistance as needed. post insertion of spinal cord stimulator    Pink eye disease of right eye     No Known Allergies  Current Outpatient Medications on File Prior to Visit   Medication Sig    albuterol (ACCUNEB) 1 25 MG/3ML nebulizer solution Take 1 ampule by nebulization every 6 (six) hours as needed for wheezing    ARIPiprazole (ABILIFY) 5 mg tablet Take 10 mg by mouth daily     atorvastatin (LIPITOR) 40 mg tablet Take 40 mg by mouth daily   baclofen 10 mg tablet Take 10 mg by mouth 3 (three) times a day    CALCIUM PO Take 1 tablet by mouth daily    clopidogrel (PLAVIX) 75 mg tablet Take 75 mg by mouth daily    Cyanocobalamin (VITAMIN B-12 PO) Take 1 tablet by mouth daily    ergocalciferol (VITAMIN D2) 50,000 units Take 50,000 Units by mouth once a week    fludrocortisone (FLORINEF) 0 1 mg tablet Take 1 tablet (0 1 mg total) by mouth daily    folic acid (FOLVITE) 1 mg tablet Take 1 mg by mouth daily     gabapentin (NEURONTIN) 300 mg capsule TAKE 1 CAPSULE (300 MG TOTAL) BY MOUTH AS NEEDED (MIGRAINE)    gabapentin (NEURONTIN) 600 MG tablet Take 600 mg by mouth 3 (three) times a day    hydrocortisone 1 % lotion APPLY TOPICALLY 2 (TWO) TIMES A DAY INDICATIONS  USING ON FEET   hydrOXYzine HCL (ATARAX) 25 mg tablet Take 75 mg by mouth 2 (two) times a day as needed for anxiety     memantine (NAMENDA) 10 mg tablet TAKE 1 TABLET (10 MG TOTAL) BY MOUTH 2 (TWO) TIMES A DAY   methocarbamol (ROBAXIN) 750 mg tablet Take 1 tablet (750 mg total) by mouth every 6 (six) hours as needed for muscle spasms    Morphine Sulfate Microinfusion (MORPHINE SULF MICROINFUSION PF IJ) Inject 14 mg as directed daily Via infusion pump    nitroglycerin (NITROSTAT) 0 4 mg SL tablet Place 0 4 mg under the tongue every 5 (five) minutes as needed for chest pain (pt has not  used recently)        nystatin (MYCOSTATIN) cream Apply 1 application topically 2 (two) times a day    omeprazole (PriLOSEC) 40 MG capsule Take 40 mg by mouth daily    Pediatric Multivit-Minerals-C (Polyvitamin/Iron) CHEW Chew 1 tablet daily    POTASSIUM PO Take 40 mEq by mouth 2 (two) times a day    prochlorperazine (COMPAZINE) 10 mg tablet Take 1 tablet (10 mg total) by mouth every 6 (six) hours as needed for nausea or vomiting (migraine)    tamsulosin (FLOMAX) 0 4 mg Take 0 4 mg by mouth daily with dinner   ULTICARE SHORT PEN NEEDLES 31G X 8 MM MISC 4 INJECTIONS PER DAY DX  E11 8    Cholecalciferol (VITAMIN D3) 5000 units CAPS Take 1 capsule (5,000 Units total) by mouth daily (Patient not taking: Reported on 5/12/2021)    oxyCODONE-acetaminophen (PERCOCET) 5-325 mg per tablet Take 1 tablet by mouth every 4 (four) hours as needed for severe painMax Daily Amount: 6 tablets (Patient not taking: Reported on 5/12/2021)    sertraline (ZOLOFT) 100 mg tablet Take 1 5 tablets (150 mg total) by mouth daily (Patient not taking: Reported on 8/11/2021)    traZODone (DESYREL) 100 mg tablet Take 2 tablets (200 mg total) by mouth daily at bedtime as needed for sleep (Patient not taking: Reported on 8/11/2021)     No current facility-administered medications on file prior to visit       Family History   Problem Relation Age of Onset    Diabetes unspecified Mother     Diabetes unspecified Brother     Diabetes unspecified Paternal Uncle     Diabetes unspecified Maternal Grandmother     Diabetes unspecified Paternal Grandmother     Diabetes unspecified Brother     Heart attack Father      Past Medical History:   Diagnosis Date    Acute on chronic diastolic congestive heart failure (HCC)     Altered gait     Alzheimer disease ([facility] [address] )     per patients,,early onset     Angina pectoris ([address] )     Anxiety     Arthritis     Brain aneurysm     coils placed    Cardiac disease     Chest pain 1/13/2016    Chronic kidney disease     Chronic pain     back/ right groin and rle- has morphine pump    Constipation     COPD (chronic obstructive pulmonary disease) (HCC)     Coronary the Last Year: Sometimes true    Ran Out of Food in the Last Year: Sometimes true   Transportation Needs: Unmet Transportation Needs    Lack of Transportation (Medical): No    Lack of Transportation (Non-Medical): Yes   Physical Activity:     Days of Exercise per Week:     Minutes of Exercise per Session:    Stress:     Feeling of Stress :    Social Connections:     Frequency of Communication with Friends and Family:     Frequency of Social Gatherings with Friends and Family:     Attends Yarsanism Services:     Active Member of Clubs or Organizations:     Attends Club or Organization Meetings:     Marital Status:    Intimate Partner Violence:     Fear of Current or Ex-Partner:     Emotionally Abused:     Physically Abused:     Sexually Abused:      Past Surgical History:   Procedure Laterality Date    [address]      with stents    CEREBRAL ANEURYSM REPAIR      with coils    COLONOSCOPY      ESOPHAGOGASTRODUODENOSCOPY N/A 7/1/2016    Procedure: ESOPHAGOGASTRODUODENOSCOPY (EGD); Surgeon: Pablito Powers MD;  [facility]: [facility]; Service:     GASTRIC BYPASS  11/19/2018    HERNIA REPAIR      HIATAL HERNIA REPAIR      INFUSION PUMP IMPLANTATION Left     morphine    INTRATHECAL PUMP IMPLANTATION Left 7/9/2020    Procedure: REVISION INTRATHECAL PAIN PUMP POCKET, LEFT ABDOMEN;  Surgeon: Thor Hdez MD;  [facility];  Service: Neurosurgery    KNEE ARTHROSCOPY Right     KNEE ARTHROSCOPY Right     PERONEAL NERVE DECOMPRESSION Right     MA DEBRIDEMENT OPEN WOUND 20 SQ CM< N/A 6/6/2019    Procedure: EXCISIONAL DEBRIDEMENT OF SACRAL DECUBITUS ULCER;  Surgeon: Thomas Zambrano MD;  [facility];  Service: General    MA ESOPHAGOGASTRODUODENOSCOPY TRANSORAL DIAGNOSTIC N/A 2/27/2017    Procedure: ESOPHAGOGASTRODUODENOSCOPY (EGD); Surgeon: Pablito Powers MD;  [facility];   Service: Gastroenterology    MA ESOPHAGOGASTRODUODENOSCOPY TRANSORAL DIAGNOSTIC N/A 8/23/2018    Procedure: ESOPHAGOGASTRODUODENOSCOPY (EGD) with biopsy;  Surgeon: Ki Pace MD;  [facility]; Service: Gastroenterology    TN INSERT SPINE INFUSN [facility] N/A 1/19/2017    Procedure: REMOVAL / EXCHANGE INTRATHECAL PAIN PUMP;  Surgeon: Latoya Edgar MD;  [facility];  Service: Orthopedics    TN INSERT SPINE INFUSN [facility] N/A 5/16/2016    Procedure: REPLACEMENT AND PROGRAM PUMP ;  Surgeon: Latoya Edgar MD;  [facility];  Service: Orthopedics    TN INSERT/ REPLACE INFUSN PUMP,PROGRAMMABLE Left 10/13/2020    Procedure: EXPLORATION OF INTRATHECAL PAIN PUMP SYSTEM INTEGRITY FOR POSSIBLE REPLACEMENT OF CATHETER AND PUMP ;  Surgeon: Mary Saba MD;  [facility];  Service: Neurosurgery    TN PERCUT IMPLNT Hessie Sow Right 3/17/2021    Procedure: INSERTION THORACIC DORSAL COLUMN SPINAL CORD STIMULATOR PERCUTANEOUS W IMPLANTABLE PULSE GENERATOR, RIGHT;  Surgeon: Mary Saba MD;  [facility];  Service: Neurosurgery         Review of Systems   All other systems reviewed and are negative  Objective:      /82   Pulse 64   Temp 98 5 °F (36 9 °C)   Wt 118 kg (261 lb 3 2 oz)   BMI 35 43 kg/m²          Physical Exam  Constitutional:       Appearance: Normal appearance  He is well-developed  HENT:      Head: Normocephalic and atraumatic  Eyes:      Conjunctiva/sclera: Conjunctivae normal    Cardiovascular:      Rate and Rhythm: Normal rate and regular rhythm  Pulmonary:      Effort: Pulmonary effort is normal       Breath sounds: Normal breath sounds  Abdominal:      General: Bowel sounds are normal  There is no distension  Palpations: Abdomen is soft  Tenderness: There is no abdominal tenderness  Musculoskeletal:      Cervical back: Normal range of motion  Comments: Walks with a walker   Skin:     General: Skin is warm and dry     Neurological: Mental Status: He is alert and oriented to person, place, and time     Psychiatric:         Mood and Affect: Mood normal          Behavior: Behavior normal

## 2023-08-10 NOTE — LETTER
April 25, 2018     Lydia Diez MD  [address]     Patient: Deric Hartman   YOB: 1960   Date of Visit: 4/24/2018       Dear Dr Ortiz Covert:    Thank you for referring Deric Hartman to me for evaluation  Below are my notes for this consultation  If you have questions, please do not hesitate to call me  I look forward to following your patient along with you  Sincerely,        Alejandra Montalvo PA-C        CC: No Recipients  Clifford Sungmelinda  4/25/2018  4:01 PM  Cosign Needed Addendum      Established Patient Progress Note      Chief Complaint   Patient presents with    Diabetes Type 2          History of Present Illness:   Deric Hartman is a 56 y o  male with a history of type 2 diabetes with long term use of insulin since about 2016  Aneudy Russell Reports complications of CKD and neuropathy  He has had a couple low blood sugars since last visit mostly have been related to use of protein shake meal replacements in prep for bariatric surgery  Surgery now on hold until fall due to NSTEMI with PCI in 10/2017 and need to remain on plavix until 10/2018  Home glucose monitoring: are performed regularly 4x per day  Most blood sugar readings are within target with occasional highs and lows  Current regimen: Novolog 34-30-34 before meals and Tresiba 120 units daily  Has been off jardiance due to CKD  Last Eye Exam: Eye appt 5/30/18  Last Foot Exam: Dr Jovany Perez last week, taking gabapentin for neuropathy     Has hypertension: Taking amlodipine  Has hyperlipidemia: Taking atorvastatin    Vitamin D Deficiency taking 5,000 units daily  Follows with npehrology Dr Parish Hatfield      Patient Active Problem List   Diagnosis    Hypertension    Type 2 diabetes mellitus with renal complication (HCC)    Chronic diastolic congestive heart failure ([address] )    Coronary artery disease involving native coronary artery    [facility]bid obesity [facility]    [facility]re[facility]    CHF (congestive heart failure) [facility]    Stroke [facility]    Stented coronary artery    Obstructive sleep apnea syndrome    MI (myocardial infarction) [facility]    Depression    CPAP (continuous positive airway pressure) dependence    Brain aneurysm    Hypokalemia    Alzheimer disease    Acute on chronic diastolic congestive heart failure (HCC)    Chronic pain disorder    Constipation    Coronary artery disease    Diabetes mellitus ([facility]    Migraine    Sleep apnea    Acute tracheobronchitis    Dyspnea on exertion    Bilateral leg edema    Chronic respiratory failure ([facility])    Chest pain    Stage 3 chronic kidney disease    Leukocytosis    GERD (gastroesophageal reflux disease)    Acute renal failure superimposed on stage 3 chronic kidney disease ([facility])    Opioid dependence ([facility])    Esophageal dysphagia    Chronic migraine without aura without status migrainosus, not intractable    Hyperlipidemia      Past Medical History:   Diagnosis Date    Acute on chronic diastolic congestive heart failure ([facility])     Altered gait     Alzheimer disease     per patients,,early onset     Angina pectoris ([facility]     Anxiety     Arthritis     Brain aneurysm     Cardiac disease     Chest pain 1/13/2016    Chronic pain     back/ right groin and rle- has morphine pump    Constipation     COPD (chronic obstructive pulmonary disease) ([facility])     Coronary artery disease     CPAP (continuous positive airway pressure) dependence     Dependent on walker for ambulation     w/c for long distance    Depression     Diabetes mellitus ([facility])     insulin dependent    Dizziness     occ    Enlarged prostate     GERD (gastroesophageal reflux disease)     Heart failure ([facility])     Hiatal hernia     Hypercholesterolemia     Hypertension     MI (myocardial infarction) ([facility])     Migraine     Neuropathy     Oxygen dependent     Q HS  2LPM with CPAP and prn during day 2-3 LPM     Shortness of breath     Sleep apnea     Stented coronary artery     Stroke ([facility]     vision loss    Urinary frequency     Wears dentures     Wears glasses       Past Surgical History:   Procedure Laterality Date    BACK SURGERY      BRAIN SURGERY      CARDIAC CATHETERIZATION      with stents    CEREBRAL ANEURYSM REPAIR      with coils    COLONOSCOPY      ESOPHAGOGASTRODUODENOSCOPY N/A 7/1/2016    Procedure: ESOPHAGOGASTRODUODENOSCOPY (EGD); Surgeon: Alejandra Sousa MD;  [facility]: [facility]; Service:     HERNIA REPAIR      HIATAL HERNIA REPAIR      KNEE ARTHROSCOPY Right     KNEE ARTHROSCOPY Right     PERONEAL NERVE DECOMPRESSION Right     RI ESOPHAGOGASTRODUODENOSCOPY TRANSORAL DIAGNOSTIC N/A 2/27/2017    Procedure: ESOPHAGOGASTRODUODENOSCOPY (EGD); Surgeon: Alejandra Sousa MD;  Location: [facility];   Service: Gastroenterology    RI INSERT SPINE INFUSN [address] [place] [address] N/A 1/19/2017    Procedure: REMOVAL / Carolin Scripture;  Surgeon: Claudetta Rang, MD;  [facility];  Service: Orthopedics    RI INSERT SPINE INFUSN [address] [place] [address] N/A 5/16/2016    Procedure: REPLACEMENT AND PROGRAM PUMP ;  Surgeon: Claudetta Rang, MD;  [facility];  Service: Orthopedics      Family History   Problem Relation Age of Onset    Diabetes unspecified Mother     Diabetes unspecified Brother     Diabetes unspecified Paternal Uncle     Diabetes unspecified Maternal Grandmother     Diabetes unspecified Paternal Grandmother     Diabetes unspecified Brother      Social History   Substance Use Topics    Smoking status: Never Smoker    Smokeless tobacco: Never Used    Alcohol use No     No Known Allergies      Current Outpatient Prescriptions:     albuterol (ACCUNEB) 1 25 MG/3ML nebulizer solution, Take 1 ampule by nebulization every 6 (six) hours as needed for wheezing, Disp: , Rfl:     albuterol (PROVENTIL HFA,VENTOLIN HFA) 90 mcg/act inhaler, Inhale 2 puffs every 6 (six) hours as needed for wheezing, Disp: , Rfl:     amLODIPine (NORVASC) 5 mg tablet, Take 1 tablet (5 mg total) by mouth daily, Disp: 30 tablet, Rfl: 5    aspirin 81 MG tablet, Take 81 mg by mouth daily  , Disp: , Rfl:     atorvastatin (LIPITOR) 40 mg tablet, Take 40 mg by mouth daily  , Disp: , Rfl:     baclofen 10 mg tablet, Take 10 mg by mouth 3 (three) times a day, Disp: , Rfl:     Cholecalciferol (VITAMIN D3) 5000 units CAPS, Take 1 capsule (5,000 Units total) by mouth daily, Disp: 30 capsule, Rfl: 6    citalopram (CeleXA) 40 mg tablet, Take 40 mg by mouth daily  , Disp: , Rfl:     clopidogrel (PLAVIX) 75 mg tablet, Take 75 mg by mouth daily  , Disp: , Rfl:     dicyclomine (BENTYL) 20 mg tablet, Take 10 mg by mouth every 6 (six) hours as needed, Disp: , Rfl:     fluticasone-salmeterol (ADVAIR) 250-50 mcg/dose, Inhale 1 puff 2 (two) times a day , Disp: , Rfl:     gabapentin (NEURONTIN) 800 mg tablet, Take 800 mg by mouth 2 (two) times a day, Disp: , Rfl:     insulin aspart (NOVOLOG FLEXPEN) 100 Units/mL SOPN, 34 before breakfast, 30 before lunch, 34 before dinner, Disp: 30 pen, Rfl: 1    Insulin Degludec (TRESIBA FLEXTOUCH) 200 UNIT/ML SOPN, Inject 120 Units under the skin daily at bedtime  , Disp: , Rfl:     isosorbide mononitrate (IMDUR) 30 mg 24 hr tablet, Take 1 tablet by mouth daily, Disp: 30 tablet, Rfl: 0    lubiprostone (AMITIZA) 24 mcg capsule, Take 24 mcg by mouth 2 (two) times a day with meals  , Disp: , Rfl:     memantine (NAMENDA) 10 mg tablet, Take 10 mg by mouth daily, Disp: , Rfl:     morphine (MS CONTIN) 100 mg 12 hr tablet, Take 100 mg by mouth 2 (two) times a day  , Disp: , Rfl:     nebivolol (BYSTOLIC) 10 mg tablet, Take 10 mg by mouth daily  , Disp: , Rfl:     nitroglycerin (NITROSTAT) 0 4 mg SL tablet, Place 0 4 mg under the tongue every 5 (five) minutes as needed for chest pain (pt has not  used recently)    , Disp: , Rfl:     omeprazole (PriLOSEC) 40 MG capsule, Take 1 capsule (40 mg total) by mouth daily, Disp: 90 capsule, Rfl: 3    oxyCODONE (ROXICODONE) 30 MG immediate release tablet, Take 30 mg by mouth every 4 (four) hours as needed for moderate pain  , Disp: , Rfl:     polyethylene glycol (MIRALAX) 17 g packet, Take 17 g by mouth daily  , Disp: , Rfl:     potassium chloride (K-DUR,KLOR-CON) 10 mEq tablet, Take 2 tablets by mouth 3 (three) times a day, Disp: 90 tablet, Rfl: 0    ranolazine (RANEXA) 1000 MG SR tablet, Take 1,000 mg by mouth 2 (two) times a day , Disp: , Rfl:     tamsulosin (FLOMAX) 0 4 mg, Take 0 4 mg by mouth daily with dinner , Disp: , Rfl:     TiZANidine (ZANAFLEX) 2 MG capsule, Take 2 mg by mouth 3 (three) times a day , Disp: , Rfl:     topiramate (TOPAMAX) 100 mg tablet, Take 100 mg by mouth 2 (two) times a day , Disp: , Rfl:     torsemide (DEMADEX) 100 mg tablet, Take 0 5 tablets by mouth 2 (two) times a day, Disp: 60 tablet, Rfl: 0    traZODone (DESYREL) 100 mg tablet, Take 100 mg by mouth daily at bedtime  , Disp: , Rfl:     zolpidem (AMBIEN) 10 mg tablet, Take 10 mg by mouth daily at bedtime as needed for sleep, Disp: , Rfl:     Review of Systems   Constitutional: Negative for activity change, appetite change and fatigue  HENT: Negative for sore throat, trouble swallowing and voice change  Eyes: Negative for visual disturbance  Respiratory: Negative for choking, chest tightness and shortness of breath  Cardiovascular: Negative for chest pain, palpitations and leg swelling  Gastrointestinal: Negative for abdominal pain, constipation and diarrhea  Endocrine: Negative for cold intolerance, heat intolerance, polydipsia, polyphagia and polyuria  Genitourinary: Negative for frequency  Musculoskeletal: Negative for arthralgias and myalgias  Skin: Negative for rash  Neurological: Negative for dizziness and syncope  Hematological: Negative for adenopathy  Psychiatric/Behavioral: Negative for sleep disturbance     All other systems reviewed and are negative  Physical Exam:  Body mass index is 47 36 kg/m²  /78   Pulse 70   Ht 6' 1" (1 854 m)   Wt (!) 163 kg (359 lb)   BMI 47 36 kg/m²     Wt Readings from Last 3 Encounters:   04/24/18 (!) 163 kg (359 lb)   01/31/18 (!) 170 kg (375 lb)   01/16/18 (!) 167 kg (367 lb 4 9 oz)       Physical Exam   Constitutional: He is oriented to person, place, and time  He appears well-developed and well-nourished  No distress  HENT:   Head: Normocephalic and atraumatic  Mouth/Throat: Oropharynx is clear and moist    Eyes: Conjunctivae and EOM are normal  Pupils are equal, round, and reactive to light  Neck: Normal range of motion  Neck supple  No thyromegaly present  Cardiovascular: Normal rate, regular rhythm and normal heart sounds  No murmur heard  Pulmonary/Chest: Effort normal and breath sounds normal  No respiratory distress  He has no wheezes  He has no rales  Abdominal: Soft  Bowel sounds are normal  He exhibits no distension  There is no tenderness  Musculoskeletal: Normal range of motion  He exhibits no edema  Lymphadenopathy:     He has no cervical adenopathy  Neurological: He is alert and oriented to person, place, and time  Skin: Skin is warm and dry  Psychiatric: He has a normal mood and affect  Vitals reviewed  Diabetic Foot Exam    Labs:      Today A1C 6 0 POC testing  4/2/18 , Cr 1 92, LFTs normal K 3 4  1/22/18  LDL 60 Chol 263 Chol 141 HDL 28  TSH 1 97 Free T4 1 09  9/12/17 Vit D 31      Lab Results   Component Value Date    HGBA1C 6 9 (H) 10/11/2017     No results found for: GLU  Lab Results   Component Value Date    HGBA1C 6 9 (H) 10/11/2017       Lab Results   Component Value Date    CREATININE 1 72 (H) 11/22/2017    CREATININE 1 99 (H) 11/15/2017    CREATININE 1 71 (H) 11/07/2017    BUN 28 (H) 11/22/2017     11/22/2017    K 3 2 (L) 11/22/2017     11/22/2017    CO2 31 11/22/2017     eGFR   Date Value Ref Range Status   11/22/2017 44 ml/min/1 73sq m Final       Lab Results   Component Value Date    CHOL 110 11/04/2017    HDL 27 (L) 11/04/2017    TRIG 177 (H) 11/04/2017       Lab Results   Component Value Date    ALT 30 10/12/2017    AST 16 10/12/2017    ALKPHOS 117 (H) 10/12/2017    BILITOT 0 46 10/12/2017       Lab Results   Component Value Date    RMK4ZXXKKKVE 2 340 10/11/2017    OPM3PQDDVOHB 3 604 03/08/2017    [id number] 1 903 08/12/2016     Lab Results   Component Value Date    FREET4 0 95 10/11/2017       Impression & Plan:    Problem List Items Addressed This Visit     Hypertension     Well controlled on current regimen  Type 2 diabetes mellitus with renal complication (HCC) - Primary     Improved/at goal with A1C of 6 0 today at visit  Some hypoglycemia related to taking full dose of novolog with protein shakes  Advised him to take half dose of Novolog when using protein shakes  If skipping meal, should skip Novolog  Reviewed symptoms/treatment of hypoglycemia  Due to being on intensive insulin therapy,  he at high risk of severe hypoglycemia  Severe hypoglycemia can result in falls, injury, coma, seizure, or death  Discussed importance of following consistent carbohydrate diet to minimize glucose fluctuations  Bariatric surgery will be postponed until fall  If he has significant weight loss or starts low carb/liquid diet before next visit he should let us know  Relevant Medications    insulin aspart (NOVOLOG FLEXPEN) 100 Units/mL SOPN    Other Relevant Orders    POCT hemoglobin A1c (Completed)    Hyperlipidemia     Continue statin  Orders Placed This Encounter   Procedures    POCT hemoglobin A1c       Patient Instructions   **if eating a protein shake instead of a usual meal, take HALF dose of novolog  **if skipping a meal, skip novolog  Be consistent with carb count at each meal to prevent fluctuating blood sugars  Send glucose log in two weeks         Hypoglycemia instructions Juan Hernandez  4/24/2018  [id number]    Low Blood Sugar    Steps to treat low blood sugar  1  Test blood sugar if you have symptoms of low blood sugar:   Low Blood Sugar Symptoms:  o Sweaty  o Dizzy  o Rapid heartbeat  o Shaky    o Bad mood  o Hungry      2  Treat blood sugar less than 70 with 15 grams of fast-acting carbohydrate:   Examples of 15 grams Fast-Acting Carbohydrate:  o 4 oz juice  o 4 oz regular soda  o 3-4 glucose tablets (chew)  o 3-4 hard candies (chew)              3    Wait 15 minutes and test your blood sugar again           4  If blood sugar is less than 100, repeat steps 2-3       5  When your blood sugar is 100 or more, eat a snack if it will be longer than one hour until your next meal  The snack should be 15 grams of carbohydrate and a protein:   Examples of snacks:  o ½ sandwich  o 6 crackers with cheese  o Piece of fruit with cheese or peanut butter  o 6 crackers with peanut butter            Discussed with the patient and all questioned fully answered  He will call me if any problems arise  Follow-up appointment in 3 months       Counseled patient on diagnostic results, prognosis, risk and benefit of treatment options, instruction for management, importance of treatment compliance, Risk  factor reduction and impressions      Shane Limon PA-C [TextBox_4] : needs auth for CT chest. here for f/u Feels respiratory status relatively stable. No inhaler uses. less cough and mucus mild sob with activity or stairs  Had diverticular bleed in 5/22 no hosp. or transfusion., No further  on PPI  on Plavix

## 2023-08-18 ENCOUNTER — HOSPITAL ENCOUNTER (INPATIENT)
Facility: HOSPITAL | Age: 63
LOS: 3 days | Discharge: HOME | DRG: 281 | End: 2023-08-21
Attending: INTERNAL MEDICINE | Admitting: HOSPITALIST
Payer: MEDICARE

## 2023-08-18 ENCOUNTER — HOSPITAL ENCOUNTER (EMERGENCY)
Facility: HOSPITAL | Age: 63
End: 2023-08-18
Attending: EMERGENCY MEDICINE
Payer: MEDICARE

## 2023-08-18 ENCOUNTER — APPOINTMENT (INPATIENT)
Dept: NON INVASIVE DIAGNOSTICS | Facility: HOSPITAL | Age: 63
DRG: 281 | End: 2023-08-18
Payer: MEDICARE

## 2023-08-18 ENCOUNTER — APPOINTMENT (INPATIENT)
Dept: RADIOLOGY | Facility: HOSPITAL | Age: 63
DRG: 281 | End: 2023-08-18
Payer: MEDICARE

## 2023-08-18 ENCOUNTER — APPOINTMENT (EMERGENCY)
Dept: RADIOLOGY | Facility: HOSPITAL | Age: 63
End: 2023-08-18
Payer: MEDICARE

## 2023-08-18 VITALS
SYSTOLIC BLOOD PRESSURE: 190 MMHG | DIASTOLIC BLOOD PRESSURE: 101 MMHG | BODY MASS INDEX: 41.47 KG/M2 | OXYGEN SATURATION: 96 % | RESPIRATION RATE: 20 BRPM | TEMPERATURE: 97.6 F | HEART RATE: 75 BPM | WEIGHT: 305.78 LBS

## 2023-08-18 DIAGNOSIS — R94.31 ST SEGMENT DEPRESSION: Primary | ICD-10-CM

## 2023-08-18 DIAGNOSIS — Z79.4 TYPE 2 DIABETES MELLITUS WITH DIABETIC NEUROPATHY, WITH LONG-TERM CURRENT USE OF INSULIN (HCC): ICD-10-CM

## 2023-08-18 DIAGNOSIS — I21.3 STEMI (ST ELEVATION MYOCARDIAL INFARCTION) (HCC): ICD-10-CM

## 2023-08-18 DIAGNOSIS — F33.1 MAJOR DEPRESSIVE DISORDER, RECURRENT, MODERATE (HCC): Primary | ICD-10-CM

## 2023-08-18 DIAGNOSIS — I24.9 ACS (ACUTE CORONARY SYNDROME) (HCC): ICD-10-CM

## 2023-08-18 DIAGNOSIS — E11.40 TYPE 2 DIABETES MELLITUS WITH DIABETIC NEUROPATHY, WITH LONG-TERM CURRENT USE OF INSULIN (HCC): ICD-10-CM

## 2023-08-18 LAB
2HR DELTA HS TROPONIN: -216 NG/L
2HR DELTA HS TROPONIN: 2416 NG/L
4HR DELTA HS TROPONIN: -333 NG/L
ANION GAP SERPL CALCULATED.3IONS-SCNC: 7 MMOL/L
APTT PPP: 29 SECONDS (ref 23–37)
ATRIAL RATE: 104 BPM
ATRIAL RATE: 500 BPM
ATRIAL RATE: 68 BPM
BASOPHILS # BLD AUTO: 0.05 THOUSANDS/ÂΜL (ref 0–0.1)
BASOPHILS NFR BLD AUTO: 0 % (ref 0–1)
BUN SERPL-MCNC: 9 MG/DL (ref 5–25)
CALCIUM SERPL-MCNC: 9.3 MG/DL (ref 8.4–10.2)
CARDIAC TROPONIN I PNL SERPL HS: 2422 NG/L
CARDIAC TROPONIN I PNL SERPL HS: 6 NG/L
CARDIAC TROPONIN I PNL SERPL HS: ABNORMAL NG/L
CHLORIDE SERPL-SCNC: 106 MMOL/L (ref 96–108)
CO2 SERPL-SCNC: 25 MMOL/L (ref 21–32)
CREAT SERPL-MCNC: 0.99 MG/DL (ref 0.6–1.3)
D DIMER PPP FEU-MCNC: 0.39 UG/ML FEU
EOSINOPHIL # BLD AUTO: 0.45 THOUSAND/ÂΜL (ref 0–0.61)
EOSINOPHIL NFR BLD AUTO: 3 % (ref 0–6)
ERYTHROCYTE [DISTWIDTH] IN BLOOD BY AUTOMATED COUNT: 12.3 % (ref 11.6–15.1)
ERYTHROCYTE [DISTWIDTH] IN BLOOD BY AUTOMATED COUNT: 12.3 % (ref 11.6–15.1)
GFR SERPL CREATININE-BSD FRML MDRD: 82 ML/MIN/1.73SQ M
GLUCOSE SERPL-MCNC: 128 MG/DL (ref 65–140)
HCT VFR BLD AUTO: 44.6 % (ref 36.5–49.3)
HCT VFR BLD AUTO: 46.1 % (ref 36.5–49.3)
HGB BLD-MCNC: 14.9 G/DL (ref 12–17)
HGB BLD-MCNC: 15.1 G/DL (ref 12–17)
IMM GRANULOCYTES # BLD AUTO: 0.1 THOUSAND/UL (ref 0–0.2)
IMM GRANULOCYTES NFR BLD AUTO: 1 % (ref 0–2)
KCT BLD-ACNC: 163 SEC (ref 89–137)
LYMPHOCYTES # BLD AUTO: 3.23 THOUSANDS/ÂΜL (ref 0.6–4.47)
LYMPHOCYTES NFR BLD AUTO: 24 % (ref 14–44)
MCH RBC QN AUTO: 29.5 PG (ref 26.8–34.3)
MCH RBC QN AUTO: 30 PG (ref 26.8–34.3)
MCHC RBC AUTO-ENTMCNC: 32.8 G/DL (ref 31.4–37.4)
MCHC RBC AUTO-ENTMCNC: 33.4 G/DL (ref 31.4–37.4)
MCV RBC AUTO: 90 FL (ref 82–98)
MCV RBC AUTO: 90 FL (ref 82–98)
MONOCYTES # BLD AUTO: 0.91 THOUSAND/ÂΜL (ref 0.17–1.22)
MONOCYTES NFR BLD AUTO: 7 % (ref 4–12)
NEUTROPHILS # BLD AUTO: 9.01 THOUSANDS/ÂΜL (ref 1.85–7.62)
NEUTS SEG NFR BLD AUTO: 65 % (ref 43–75)
NRBC BLD AUTO-RTO: 0 /100 WBCS
P AXIS: 39 DEGREES
P AXIS: 47 DEGREES
PLATELET # BLD AUTO: 242 THOUSANDS/UL (ref 149–390)
PLATELET # BLD AUTO: 266 THOUSANDS/UL (ref 149–390)
PLATELET # BLD AUTO: 270 THOUSANDS/UL (ref 149–390)
PMV BLD AUTO: 10 FL (ref 8.9–12.7)
PMV BLD AUTO: 10.1 FL (ref 8.9–12.7)
PMV BLD AUTO: 10.2 FL (ref 8.9–12.7)
POTASSIUM SERPL-SCNC: 3.6 MMOL/L (ref 3.5–5.3)
QRS AXIS: -18 DEGREES
QRS AXIS: -70 DEGREES
QRS AXIS: -73 DEGREES
QRSD INTERVAL: 118 MS
QRSD INTERVAL: 124 MS
QRSD INTERVAL: 144 MS
QT INTERVAL: 424 MS
QT INTERVAL: 430 MS
QT INTERVAL: 444 MS
QTC INTERVAL: 457 MS
QTC INTERVAL: 470 MS
QTC INTERVAL: 472 MS
RBC # BLD AUTO: 4.97 MILLION/UL (ref 3.88–5.62)
RBC # BLD AUTO: 5.12 MILLION/UL (ref 3.88–5.62)
SODIUM SERPL-SCNC: 138 MMOL/L (ref 135–147)
SPECIMEN SOURCE: ABNORMAL
T WAVE AXIS: 110 DEGREES
T WAVE AXIS: 161 DEGREES
T WAVE AXIS: 248 DEGREES
TSH SERPL DL<=0.05 MIU/L-ACNC: 2.66 UIU/ML (ref 0.45–4.5)
VENTRICULAR RATE: 68 BPM
VENTRICULAR RATE: 68 BPM
VENTRICULAR RATE: 74 BPM
WBC # BLD AUTO: 13.75 THOUSAND/UL (ref 4.31–10.16)
WBC # BLD AUTO: 14.42 THOUSAND/UL (ref 4.31–10.16)

## 2023-08-18 PROCEDURE — 96375 TX/PRO/DX INJ NEW DRUG ADDON: CPT

## 2023-08-18 PROCEDURE — 85049 AUTOMATED PLATELET COUNT: CPT

## 2023-08-18 PROCEDURE — 93005 ELECTROCARDIOGRAM TRACING: CPT

## 2023-08-18 PROCEDURE — 96374 THER/PROPH/DIAG INJ IV PUSH: CPT

## 2023-08-18 PROCEDURE — 85347 COAGULATION TIME ACTIVATED: CPT

## 2023-08-18 PROCEDURE — 71045 X-RAY EXAM CHEST 1 VIEW: CPT

## 2023-08-18 PROCEDURE — 93308 TTE F-UP OR LMTD: CPT

## 2023-08-18 PROCEDURE — 99232 SBSQ HOSP IP/OBS MODERATE 35: CPT

## 2023-08-18 PROCEDURE — 74177 CT ABD & PELVIS W/CONTRAST: CPT

## 2023-08-18 PROCEDURE — 96365 THER/PROPH/DIAG IV INF INIT: CPT

## 2023-08-18 PROCEDURE — 80048 BASIC METABOLIC PNL TOTAL CA: CPT | Performed by: EMERGENCY MEDICINE

## 2023-08-18 PROCEDURE — 71275 CT ANGIOGRAPHY CHEST: CPT

## 2023-08-18 PROCEDURE — 93454 CORONARY ARTERY ANGIO S&I: CPT | Performed by: INTERNAL MEDICINE

## 2023-08-18 PROCEDURE — 99152 MOD SED SAME PHYS/QHP 5/>YRS: CPT | Performed by: INTERNAL MEDICINE

## 2023-08-18 PROCEDURE — C1769 GUIDE WIRE: HCPCS | Performed by: INTERNAL MEDICINE

## 2023-08-18 PROCEDURE — 93321 DOPPLER ECHO F-UP/LMTD STD: CPT

## 2023-08-18 PROCEDURE — 99223 1ST HOSP IP/OBS HIGH 75: CPT | Performed by: HOSPITALIST

## 2023-08-18 PROCEDURE — 85025 COMPLETE CBC W/AUTO DIFF WBC: CPT | Performed by: EMERGENCY MEDICINE

## 2023-08-18 PROCEDURE — 93325 DOPPLER ECHO COLOR FLOW MAPG: CPT

## 2023-08-18 PROCEDURE — 84484 ASSAY OF TROPONIN QUANT: CPT | Performed by: EMERGENCY MEDICINE

## 2023-08-18 PROCEDURE — 84484 ASSAY OF TROPONIN QUANT: CPT | Performed by: INTERNAL MEDICINE

## 2023-08-18 PROCEDURE — 85379 FIBRIN DEGRADATION QUANT: CPT | Performed by: EMERGENCY MEDICINE

## 2023-08-18 PROCEDURE — 36415 COLL VENOUS BLD VENIPUNCTURE: CPT | Performed by: EMERGENCY MEDICINE

## 2023-08-18 PROCEDURE — 93010 ELECTROCARDIOGRAM REPORT: CPT | Performed by: INTERNAL MEDICINE

## 2023-08-18 PROCEDURE — C1894 INTRO/SHEATH, NON-LASER: HCPCS | Performed by: INTERNAL MEDICINE

## 2023-08-18 PROCEDURE — 85027 COMPLETE CBC AUTOMATED: CPT | Performed by: EMERGENCY MEDICINE

## 2023-08-18 PROCEDURE — 85730 THROMBOPLASTIN TIME PARTIAL: CPT | Performed by: INTERNAL MEDICINE

## 2023-08-18 PROCEDURE — 84443 ASSAY THYROID STIM HORMONE: CPT | Performed by: HOSPITALIST

## 2023-08-18 PROCEDURE — 99285 EMERGENCY DEPT VISIT HI MDM: CPT

## 2023-08-18 PROCEDURE — 99291 CRITICAL CARE FIRST HOUR: CPT | Performed by: EMERGENCY MEDICINE

## 2023-08-18 PROCEDURE — 84484 ASSAY OF TROPONIN QUANT: CPT

## 2023-08-18 PROCEDURE — 99284 EMERGENCY DEPT VISIT MOD MDM: CPT | Performed by: INTERNAL MEDICINE

## 2023-08-18 PROCEDURE — B2111ZZ FLUOROSCOPY OF MULTIPLE CORONARY ARTERIES USING LOW OSMOLAR CONTRAST: ICD-10-PCS | Performed by: INTERNAL MEDICINE

## 2023-08-18 RX ORDER — CLOPIDOGREL BISULFATE 75 MG/1
75 TABLET ORAL DAILY
Status: DISCONTINUED | OUTPATIENT
Start: 2023-08-19 | End: 2023-08-21 | Stop reason: HOSPADM

## 2023-08-18 RX ORDER — AMLODIPINE BESYLATE 5 MG/1
5 TABLET ORAL DAILY
Status: DISCONTINUED | OUTPATIENT
Start: 2023-08-18 | End: 2023-08-21 | Stop reason: HOSPADM

## 2023-08-18 RX ORDER — ALBUTEROL SULFATE 2.5 MG/3ML
2.5 SOLUTION RESPIRATORY (INHALATION) EVERY 4 HOURS PRN
Status: DISCONTINUED | OUTPATIENT
Start: 2023-08-18 | End: 2023-08-21 | Stop reason: HOSPADM

## 2023-08-18 RX ORDER — POLYETHYLENE GLYCOL 3350 17 G/17G
17 POWDER, FOR SOLUTION ORAL DAILY
Status: DISCONTINUED | OUTPATIENT
Start: 2023-08-19 | End: 2023-08-21 | Stop reason: HOSPADM

## 2023-08-18 RX ORDER — ASPIRIN 81 MG/1
81 TABLET, CHEWABLE ORAL DAILY
Status: DISCONTINUED | OUTPATIENT
Start: 2023-08-19 | End: 2023-08-21 | Stop reason: HOSPADM

## 2023-08-18 RX ORDER — CARVEDILOL 12.5 MG/1
12.5 TABLET ORAL 2 TIMES DAILY WITH MEALS
Status: DISCONTINUED | OUTPATIENT
Start: 2023-08-18 | End: 2023-08-21 | Stop reason: HOSPADM

## 2023-08-18 RX ORDER — BACLOFEN 10 MG/1
10 TABLET ORAL 3 TIMES DAILY
Status: DISCONTINUED | OUTPATIENT
Start: 2023-08-18 | End: 2023-08-21 | Stop reason: HOSPADM

## 2023-08-18 RX ORDER — MORPHINE SULFATE 4 MG/ML
4 INJECTION, SOLUTION INTRAMUSCULAR; INTRAVENOUS EVERY 4 HOURS PRN
Status: DISCONTINUED | OUTPATIENT
Start: 2023-08-18 | End: 2023-08-21 | Stop reason: HOSPADM

## 2023-08-18 RX ORDER — FENTANYL CITRATE 50 UG/ML
100 INJECTION, SOLUTION INTRAMUSCULAR; INTRAVENOUS ONCE
Status: COMPLETED | OUTPATIENT
Start: 2023-08-18 | End: 2023-08-18

## 2023-08-18 RX ORDER — HEPARIN SODIUM 1000 [USP'U]/ML
4000 INJECTION, SOLUTION INTRAVENOUS; SUBCUTANEOUS ONCE
Status: COMPLETED | OUTPATIENT
Start: 2023-08-18 | End: 2023-08-18

## 2023-08-18 RX ORDER — HYDRALAZINE HYDROCHLORIDE 20 MG/ML
10 INJECTION INTRAMUSCULAR; INTRAVENOUS EVERY 6 HOURS PRN
Status: DISCONTINUED | OUTPATIENT
Start: 2023-08-18 | End: 2023-08-18

## 2023-08-18 RX ORDER — GABAPENTIN 300 MG/1
600 CAPSULE ORAL 3 TIMES DAILY
Status: DISCONTINUED | OUTPATIENT
Start: 2023-08-18 | End: 2023-08-21 | Stop reason: HOSPADM

## 2023-08-18 RX ORDER — ASPIRIN 81 MG/1
324 TABLET, CHEWABLE ORAL ONCE
Status: COMPLETED | OUTPATIENT
Start: 2023-08-18 | End: 2023-08-18

## 2023-08-18 RX ORDER — VERAPAMIL HCL 2.5 MG/ML
AMPUL (ML) INTRAVENOUS CODE/TRAUMA/SEDATION MEDICATION
Status: DISCONTINUED | OUTPATIENT
Start: 2023-08-18 | End: 2023-08-18 | Stop reason: HOSPADM

## 2023-08-18 RX ORDER — LISINOPRIL 10 MG/1
10 TABLET ORAL DAILY
Status: DISCONTINUED | OUTPATIENT
Start: 2023-08-18 | End: 2023-08-19

## 2023-08-18 RX ORDER — HEPARIN SODIUM 10000 [USP'U]/100ML
3-27 INJECTION, SOLUTION INTRAVENOUS
Status: DISCONTINUED | OUTPATIENT
Start: 2023-08-18 | End: 2023-08-21

## 2023-08-18 RX ORDER — ACETAMINOPHEN 325 MG/1
650 TABLET ORAL EVERY 4 HOURS PRN
Status: DISCONTINUED | OUTPATIENT
Start: 2023-08-18 | End: 2023-08-18

## 2023-08-18 RX ORDER — FOLIC ACID 1 MG/1
1 TABLET ORAL DAILY
Status: DISCONTINUED | OUTPATIENT
Start: 2023-08-19 | End: 2023-08-21 | Stop reason: HOSPADM

## 2023-08-18 RX ORDER — LISINOPRIL 10 MG/1
10 TABLET ORAL DAILY
Status: DISCONTINUED | OUTPATIENT
Start: 2023-08-19 | End: 2023-08-18

## 2023-08-18 RX ORDER — ATORVASTATIN CALCIUM 80 MG/1
80 TABLET, FILM COATED ORAL
Status: DISCONTINUED | OUTPATIENT
Start: 2023-08-18 | End: 2023-08-21 | Stop reason: HOSPADM

## 2023-08-18 RX ORDER — HEPARIN SODIUM 5000 [USP'U]/ML
5000 INJECTION, SOLUTION INTRAVENOUS; SUBCUTANEOUS EVERY 8 HOURS SCHEDULED
Status: DISCONTINUED | OUTPATIENT
Start: 2023-08-18 | End: 2023-08-18 | Stop reason: SDUPTHER

## 2023-08-18 RX ORDER — HYDRALAZINE HYDROCHLORIDE 20 MG/ML
10 INJECTION INTRAMUSCULAR; INTRAVENOUS EVERY 6 HOURS PRN
Status: COMPLETED | OUTPATIENT
Start: 2023-08-18 | End: 2023-08-18

## 2023-08-18 RX ORDER — FENTANYL CITRATE 50 UG/ML
INJECTION, SOLUTION INTRAMUSCULAR; INTRAVENOUS CODE/TRAUMA/SEDATION MEDICATION
Status: DISCONTINUED | OUTPATIENT
Start: 2023-08-18 | End: 2023-08-18 | Stop reason: HOSPADM

## 2023-08-18 RX ORDER — LABETALOL HYDROCHLORIDE 5 MG/ML
10 INJECTION, SOLUTION INTRAVENOUS ONCE
Status: COMPLETED | OUTPATIENT
Start: 2023-08-18 | End: 2023-08-18

## 2023-08-18 RX ORDER — HEPARIN SODIUM 10000 [USP'U]/100ML
3-20 INJECTION, SOLUTION INTRAVENOUS
Status: DISCONTINUED | OUTPATIENT
Start: 2023-08-18 | End: 2023-08-18 | Stop reason: HOSPADM

## 2023-08-18 RX ORDER — HYDROMORPHONE HCL/PF 1 MG/ML
1 SYRINGE (ML) INJECTION ONCE
Status: COMPLETED | OUTPATIENT
Start: 2023-08-18 | End: 2023-08-18

## 2023-08-18 RX ORDER — OMEPRAZOLE 20 MG/1
CAPSULE, DELAYED RELEASE ORAL
COMMUNITY
End: 2023-08-31

## 2023-08-18 RX ORDER — HEPARIN SODIUM 1000 [USP'U]/ML
4000 INJECTION, SOLUTION INTRAVENOUS; SUBCUTANEOUS EVERY 6 HOURS PRN
Status: DISCONTINUED | OUTPATIENT
Start: 2023-08-18 | End: 2023-08-18 | Stop reason: HOSPADM

## 2023-08-18 RX ORDER — NITROGLYCERIN 20 MG/100ML
5-200 INJECTION INTRAVENOUS
Status: DISCONTINUED | OUTPATIENT
Start: 2023-08-18 | End: 2023-08-20

## 2023-08-18 RX ORDER — MIDAZOLAM HYDROCHLORIDE 2 MG/2ML
INJECTION, SOLUTION INTRAMUSCULAR; INTRAVENOUS CODE/TRAUMA/SEDATION MEDICATION
Status: DISCONTINUED | OUTPATIENT
Start: 2023-08-18 | End: 2023-08-18 | Stop reason: HOSPADM

## 2023-08-18 RX ORDER — ALBUTEROL SULFATE 90 UG/1
2 AEROSOL, METERED RESPIRATORY (INHALATION) EVERY 4 HOURS PRN
Status: DISCONTINUED | OUTPATIENT
Start: 2023-08-18 | End: 2023-08-21 | Stop reason: HOSPADM

## 2023-08-18 RX ORDER — OXYCODONE HYDROCHLORIDE 5 MG/1
5 TABLET ORAL EVERY 4 HOURS PRN
Status: DISCONTINUED | OUTPATIENT
Start: 2023-08-18 | End: 2023-08-21 | Stop reason: HOSPADM

## 2023-08-18 RX ORDER — LIDOCAINE HYDROCHLORIDE 10 MG/ML
INJECTION, SOLUTION EPIDURAL; INFILTRATION; INTRACAUDAL; PERINEURAL CODE/TRAUMA/SEDATION MEDICATION
Status: DISCONTINUED | OUTPATIENT
Start: 2023-08-18 | End: 2023-08-18 | Stop reason: HOSPADM

## 2023-08-18 RX ORDER — NITROGLYCERIN 20 MG/100ML
5-200 INJECTION INTRAVENOUS
Status: DISCONTINUED | OUTPATIENT
Start: 2023-08-18 | End: 2023-08-18

## 2023-08-18 RX ORDER — ONDANSETRON 2 MG/ML
4 INJECTION INTRAMUSCULAR; INTRAVENOUS EVERY 6 HOURS PRN
Status: DISCONTINUED | OUTPATIENT
Start: 2023-08-18 | End: 2023-08-21 | Stop reason: HOSPADM

## 2023-08-18 RX ORDER — HEPARIN SODIUM 1000 [USP'U]/ML
2000 INJECTION, SOLUTION INTRAVENOUS; SUBCUTANEOUS EVERY 6 HOURS PRN
Status: DISCONTINUED | OUTPATIENT
Start: 2023-08-18 | End: 2023-08-21

## 2023-08-18 RX ORDER — HEPARIN SODIUM 1000 [USP'U]/ML
4000 INJECTION, SOLUTION INTRAVENOUS; SUBCUTANEOUS EVERY 6 HOURS PRN
Status: DISCONTINUED | OUTPATIENT
Start: 2023-08-18 | End: 2023-08-21

## 2023-08-18 RX ORDER — SODIUM CHLORIDE 9 MG/ML
INJECTION, SOLUTION INTRAVENOUS
Status: COMPLETED | OUTPATIENT
Start: 2023-08-18 | End: 2023-08-18

## 2023-08-18 RX ORDER — PANTOPRAZOLE SODIUM 40 MG/1
40 TABLET, DELAYED RELEASE ORAL
Status: DISCONTINUED | OUTPATIENT
Start: 2023-08-19 | End: 2023-08-21 | Stop reason: HOSPADM

## 2023-08-18 RX ORDER — TAMSULOSIN HYDROCHLORIDE 0.4 MG/1
0.4 CAPSULE ORAL
Status: DISCONTINUED | OUTPATIENT
Start: 2023-08-18 | End: 2023-08-21 | Stop reason: HOSPADM

## 2023-08-18 RX ORDER — NITROGLYCERIN 20 MG/100ML
INJECTION INTRAVENOUS CODE/TRAUMA/SEDATION MEDICATION
Status: DISCONTINUED | OUTPATIENT
Start: 2023-08-18 | End: 2023-08-18 | Stop reason: HOSPADM

## 2023-08-18 RX ORDER — ACETAMINOPHEN 325 MG/1
975 TABLET ORAL EVERY 8 HOURS PRN
Status: DISCONTINUED | OUTPATIENT
Start: 2023-08-18 | End: 2023-08-21 | Stop reason: HOSPADM

## 2023-08-18 RX ORDER — AMOXICILLIN 250 MG
1 CAPSULE ORAL 2 TIMES DAILY
Status: DISCONTINUED | OUTPATIENT
Start: 2023-08-18 | End: 2023-08-21 | Stop reason: HOSPADM

## 2023-08-18 RX ORDER — AMLODIPINE BESYLATE 5 MG/1
5 TABLET ORAL DAILY
Status: DISCONTINUED | OUTPATIENT
Start: 2023-08-19 | End: 2023-08-18

## 2023-08-18 RX ORDER — SODIUM CHLORIDE 9 MG/ML
75 INJECTION, SOLUTION INTRAVENOUS CONTINUOUS
Status: DISPENSED | OUTPATIENT
Start: 2023-08-18 | End: 2023-08-18

## 2023-08-18 RX ORDER — NITROGLYCERIN 0.4 MG/1
0.4 TABLET SUBLINGUAL ONCE
Status: COMPLETED | OUTPATIENT
Start: 2023-08-18 | End: 2023-08-18

## 2023-08-18 RX ORDER — ALBUTEROL SULFATE 2.5 MG/3ML
1.25 SOLUTION RESPIRATORY (INHALATION) EVERY 6 HOURS PRN
Status: DISCONTINUED | OUTPATIENT
Start: 2023-08-18 | End: 2023-08-18

## 2023-08-18 RX ORDER — HEPARIN SODIUM 1000 [USP'U]/ML
2000 INJECTION, SOLUTION INTRAVENOUS; SUBCUTANEOUS EVERY 6 HOURS PRN
Status: DISCONTINUED | OUTPATIENT
Start: 2023-08-18 | End: 2023-08-18 | Stop reason: HOSPADM

## 2023-08-18 RX ORDER — FUROSEMIDE 10 MG/ML
40 INJECTION INTRAMUSCULAR; INTRAVENOUS ONCE
Status: COMPLETED | OUTPATIENT
Start: 2023-08-18 | End: 2023-08-18

## 2023-08-18 RX ORDER — ENOXAPARIN SODIUM 100 MG/ML
40 INJECTION SUBCUTANEOUS 2 TIMES DAILY
Status: DISCONTINUED | OUTPATIENT
Start: 2023-08-18 | End: 2023-08-18

## 2023-08-18 RX ORDER — CARVEDILOL 12.5 MG/1
TABLET ORAL
COMMUNITY
Start: 2023-08-15 | End: 2023-08-21

## 2023-08-18 RX ADMIN — ACETAMINOPHEN 650 MG: 325 TABLET, FILM COATED ORAL at 12:07

## 2023-08-18 RX ADMIN — TAMSULOSIN HYDROCHLORIDE 0.4 MG: 0.4 CAPSULE ORAL at 17:17

## 2023-08-18 RX ADMIN — NITROGLYCERIN 0.4 MG: 0.4 TABLET SUBLINGUAL at 08:37

## 2023-08-18 RX ADMIN — OXYCODONE HYDROCHLORIDE 5 MG: 5 TABLET ORAL at 20:01

## 2023-08-18 RX ADMIN — ASPIRIN 81 MG 324 MG: 81 TABLET ORAL at 08:17

## 2023-08-18 RX ADMIN — ATORVASTATIN CALCIUM 80 MG: 80 TABLET, FILM COATED ORAL at 17:01

## 2023-08-18 RX ADMIN — FENTANYL CITRATE 100 MCG: 50 INJECTION, SOLUTION INTRAMUSCULAR; INTRAVENOUS at 08:17

## 2023-08-18 RX ADMIN — LISINOPRIL 10 MG: 10 TABLET ORAL at 17:02

## 2023-08-18 RX ADMIN — GABAPENTIN 600 MG: 300 CAPSULE ORAL at 23:45

## 2023-08-18 RX ADMIN — HEPARIN SODIUM 4000 UNITS: 1000 INJECTION INTRAVENOUS; SUBCUTANEOUS at 09:40

## 2023-08-18 RX ADMIN — ENOXAPARIN SODIUM 40 MG: 40 INJECTION SUBCUTANEOUS at 17:17

## 2023-08-18 RX ADMIN — LABETALOL HYDROCHLORIDE 10 MG: 5 INJECTION, SOLUTION INTRAVENOUS at 11:45

## 2023-08-18 RX ADMIN — NITROGLYCERIN 30 MCG/MIN: 20 INJECTION INTRAVENOUS at 13:02

## 2023-08-18 RX ADMIN — ACETAMINOPHEN 975 MG: 325 TABLET, FILM COATED ORAL at 17:01

## 2023-08-18 RX ADMIN — HYDROMORPHONE HYDROCHLORIDE 1 MG: 1 INJECTION, SOLUTION INTRAMUSCULAR; INTRAVENOUS; SUBCUTANEOUS at 09:01

## 2023-08-18 RX ADMIN — CARVEDILOL 12.5 MG: 12.5 TABLET, FILM COATED ORAL at 17:01

## 2023-08-18 RX ADMIN — IOHEXOL 100 ML: 350 INJECTION, SOLUTION INTRAVENOUS at 22:55

## 2023-08-18 RX ADMIN — HEPARIN SODIUM 11.1 UNITS/KG/HR: 10000 INJECTION, SOLUTION INTRAVENOUS at 09:39

## 2023-08-18 RX ADMIN — GABAPENTIN 600 MG: 300 CAPSULE ORAL at 17:17

## 2023-08-18 RX ADMIN — ONDANSETRON 4 MG: 2 INJECTION INTRAMUSCULAR; INTRAVENOUS at 20:41

## 2023-08-18 RX ADMIN — AMLODIPINE BESYLATE 5 MG: 5 TABLET ORAL at 17:01

## 2023-08-18 RX ADMIN — BACLOFEN 10 MG: 10 TABLET ORAL at 17:17

## 2023-08-18 RX ADMIN — BACLOFEN 10 MG: 10 TABLET ORAL at 23:45

## 2023-08-18 RX ADMIN — HEPARIN SODIUM 11.1 UNITS/KG/HR: 10000 INJECTION, SOLUTION INTRAVENOUS at 23:37

## 2023-08-18 RX ADMIN — TICAGRELOR 180 MG: 90 TABLET ORAL at 09:47

## 2023-08-18 RX ADMIN — FUROSEMIDE 40 MG: 10 INJECTION, SOLUTION INTRAMUSCULAR; INTRAVENOUS at 12:29

## 2023-08-18 RX ADMIN — HYDRALAZINE HYDROCHLORIDE 10 MG: 20 INJECTION, SOLUTION INTRAMUSCULAR; INTRAVENOUS at 12:11

## 2023-08-18 NOTE — ASSESSMENT & PLAN NOTE
· Presented to [facility] with CP, concern for anterior STEMI hence sent to [facility] -> underwent urgent LHC  •  Dist LM lesion is 50% stenosed. •  Ost LAD lesion is 40% stenosed. •  Mid LAD lesion is 40% stenosed.   •  Mid Cx lesion is 20% stenosed  · Per cardiology do not suspect ischemic pain, suspect 2/2 medication noncompliance & high BP  · Started on nitro drip by cardio for BP Mx, started on home PO coreg, amlodipine, lisinopril added by cardio  · Troponin trending up to 16k -> cardio made aware  · Echo pending  · Ordered tylenol & oxycodone for pain control

## 2023-08-18 NOTE — ASSESSMENT & PLAN NOTE
Wt Readings from Last 3 Encounters:   08/18/23 (!) 139 kg (305 lb 12.5 oz)   07/09/22 116 kg (255 lb)   08/11/21 118 kg (261 lb 3.2 oz)     · S/p IV lasix x 1 in cath lab  · Appears stable

## 2023-08-18 NOTE — ASSESSMENT & PLAN NOTE
· H/o PCI x 6  · Ran out of plavix, statin, BB & since PCP left the practice couldn't get meds refilled

## 2023-08-18 NOTE — PROGRESS NOTES
General Cardiology   Progress Note -  [employer]   Yumiko Jack 63 y.o. male MRN: [id number]    Unit/Bed#: BE CATH LAB ROOM Encounter: [id number]    Assessment:  Chest pain with EKG changes concerning for ACS  -Initially presented to [address] with complaints of midsternal  chest pain which started at 7:30 AM associated with shortness of breath and nausea. Transferred to [address] [place] [address] for emergent cardiac catheterization.  -Admits to not taking medications over the last 1-2 months due to "mental issues". -Initial troponin 6  -EKG: Anterior ST elevations with reciprocal ST depressions in inferior lateral leads  -Left heart cath: No critical flow lesions. Hypertensive Urgency  -/100.  -Chest pain associated with elevated BP  -Non compliant with medications  -Nitroglycerine gtt started at 30 mcg/min      Coronary artery disease  -2010 MI with stenting to unknown vessel  -2015 [facility]: Distal LAD 85% stenosed, proximal circumflex 80%, distal circumflex 90%, first OM 80%, mid- RCA 40%, distal RCA 40%. Status post PCI/OMER to proximal circumflex  -2017 [facility]: Distal LAD 95%, proximal circumflex stent patent, mid RCA 85%, status post PCI/OMER to mid RCA  -2020 [facility]: LM normal, proximal LAD 85%, mid LAD 5 to 10%, circumflex 20% mid lesion, OM showing luminal irregularities, proximal RCA 20%, distal RCA 30%. Status post PCI/OMER to proximal LAD  -2022 [facility]: Left main-luminal irregularities, ostial LAD 30%, proximal LAD stents patent, mid LAD 80% stenosed, ostial to mid circumflex 20%, OM minimal irregularities, proximal RCA 20%, distal RCA 30%, right PDA 50%.   Status post PCI/OMER to proximal to mid LAD    Obesity  - BMI 41.4    Hyperlipidemia  -Cholesterol 136, triglycerides 75, HDL 39 LDL 82 (7/2022)  -Atorvastatin 40 mg daily    Obstructive sleep apnea  -CPAP    Early onset dementia  -Home regimen     COPD  -On inhalers  -Home O2 2-3 L     Type 2 diabetes  -Hemoglobin A1c 5.3 (7/14/2022)    Chronic back pain  -On fentanyl pump    Plan/Recommendations:  -Continue ASA/beta-blocker and statin  -Continue Plavix 75 mg daily  -Hemoglobin A1c, lipid profile  -Echocardiogram  -Continue to trend trop   -Keep -170/    ______________________________________________________________________________________    See consult from [facility]. Left heart cardiac catheterization performed on 8/18/2023 showed:   No critical lesions. Dist LM lesion is 50% stenosed. •  Ost LAD lesion is 40% stenosed. •  Mid LAD lesion is 40% stenosed. •  Mid Cx lesion is 20% stenosed. Medical management is indicated. The patient has a strong history of medical noncompliance and it is not clear that he has the capacity to strictly comply with his medications. If there were disease progression in the left main/ostial LAD, PCI would require a complex left main intervention. Risk would outweigh benefit if the patient were noncompliant. Consequently, if the patient warrants re-evaluation in the future, percutaneous revascularization would be an appropriate option only if the patient were in a facility where medical compliance could be guaranteed.          Review of Systems   Constitutional: Negative. Negative for chills and fever. HENT: Negative. Negative for congestion. Eyes: Negative. Cardiovascular: Positive for chest pain. Negative for dyspnea on exertion, leg swelling, orthopnea and paroxysmal nocturnal dyspnea. Respiratory: Negative. Negative for cough. Endocrine: Negative. Skin: Negative. Musculoskeletal: Positive for back pain. Gastrointestinal: Negative for abdominal pain, heartburn, nausea and vomiting. Genitourinary: Negative. Neurological: Negative. Psychiatric/Behavioral: Negative for substance abuse. Allergic/Immunologic: Negative. Objective:   Vitals:  There were no vitals taken for this visit.,     Wt Readings from Last 3 Encounters:   08/18/23 (!) 139 kg (305 lb 12.5 oz)   07/09/22 116 kg (255 lb)   08/11/21 118 kg (261 lb 3.2 oz)        Lab Results   Component Value Date    CREATININE 0.99 08/18/2023    CREATININE 1.20 07/09/2022    CREATININE 1.06 01/21/2022         There is no height or weight on file to calculate BMI.,     Systolic (23AXI), DRD:344 , Min:166 , SKH:270     Diastolic (58OTP), QOM:902, Min:87, Max:127        No intake or output data in the 24 hours ending 08/18/23 1044  Weight (last 2 days)     None                EKG personally reviewed by JACLYN Ceballos. Physical Exam  Constitutional:       Appearance: Normal appearance. He is obese. HENT:      Head: Normocephalic. Nose: Nose normal.      Mouth/Throat:      Mouth: Mucous membranes are moist.   Eyes:      Conjunctiva/sclera: Conjunctivae normal.   Neck:      Vascular: No carotid bruit. Cardiovascular:      Rate and Rhythm: Normal rate and regular rhythm. Pulses: Normal pulses. Heart sounds: Normal heart sounds. No murmur heard. No friction rub. No gallop. Pulmonary:      Effort: Pulmonary effort is normal.      Breath sounds: Normal breath sounds. No rhonchi or rales. Abdominal:      General: Bowel sounds are normal.      Palpations: Abdomen is soft. Musculoskeletal:      Cervical back: Neck supple. Right lower leg: No edema. Left lower leg: No edema. Skin:     General: Skin is warm. Capillary Refill: Capillary refill takes less than 2 seconds. Neurological:      Mental Status: He is alert. Psychiatric:         Attention and Perception: Attention normal.         Mood and Affect: Mood normal.         Speech: Speech is rapid and pressured. Behavior: Behavior is cooperative. Cognition and Memory: Cognition is impaired.          LABORATORY RESULTS      CBC with diff:   Results from last 7 days   Lab Units 08/18/23  1006 08/18/23  0818   WBC Thousand/uL 14.42* 13.75*   HEMOGLOBIN g/dL 14.9 15.1 HEMATOCRIT % 44.6 46.1   MCV fL 90 90   PLATELETS Thousands/uL 242 270   RBC Million/uL 4.97 5.12   MCH pg 30.0 29.5   MCHC g/dL 33.4 32.8   RDW % 12.3 12.3   MPV fL 10.1 10.0   NRBC AUTO /100 WBCs  --  0       CMP:  Results from last 7 days   Lab Units 23  0818   POTASSIUM mmol/L 3.6   CHLORIDE mmol/L 106   CO2 mmol/L 25   BUN mg/dL 9   CREATININE mg/dL 0.99   CALCIUM mg/dL 9.3   EGFR ml/min/1.73sq m 82       BMP:  Results from last 7 days   Lab Units 23  0818   POTASSIUM mmol/L 3.6   CHLORIDE mmol/L 106   CO2 mmol/L 25   BUN mg/dL 9   CREATININE mg/dL 0.99   CALCIUM mg/dL 9.3       Lab Results   Component Value Date    NTBNP 75 2022    NTBNP 316 (H) 2019    NTBNP 399 (H) 2017                                     Lipid Profile:   Lab Results   Component Value Date    CHOL 119 2015    CHOL 134 2014     Lab Results   Component Value Date    HDL 28 (L) 10/07/2020    HDL 27 (L) 2017    HDL 25 (L) 2017     Lab Results   Component Value Date    LDLCALC 98 10/07/2020    LDLCALC 48 2017    LDLCALC 44 2017     Lab Results   Component Value Date    TRIG 107 10/07/2020    TRIG 177 (H) 2017    TRIG 175 (H) 2017       Cardiac testing:   Results for orders placed during the hospital encounter of 19    Echo complete with contrast if indicated    Narrative  [address] [place] [address].   [address]  [phone]    Transthoracic Echocardiogram  2D, M-mode, Doppler, and Color Doppler    Study date:  11-Aug-2019    Patient: Angel Cantu  MR number: [id number]  Account number: [de-identified]  : 1960  Age: 66 years  Gender: Male  Status: Inpatient  Location: Bedside  Height: 72 in  Weight: 239 lb  BP: 125/ 60 mmHg    Indications: Syncope    Diagnoses: R55. - Syncope and collapse    Sonographer:  Alvina Cruz DENILSON  Primary Physician:  Reggie King MD  Referring Physician:  Heydi Aquino JANNA Garcia  Group:  [facility]  Interpreting Physician:  Aidan José MD    SUMMARY    LEFT VENTRICLE:  Normal left ventricular systolic function, EF 86%. Normal left ventricular cavity size. Moderate concentric left ventricular hypertrophy. Normal left ventricular wall motion without regional wall motion abnormalities. Normal left  ventricular diastolic function. Normal left atrial pressures. LEFT ATRIUM:  Mild left atrial enlargement. RIGHT ATRIUM:  Mild right atrial enlargement. Left atrium larger than right atrium. AORTIC VALVE:  The aortic valve is tricuspid. The non coronary cusp is heavily calcified and the left coronary cusp is mild-to-moderately calcified. All 3 leaflets have good excursion. There is no aortic stenosis or regurgitation. TRICUSPID VALVE:  There was trace regurgitation. PULMONARY ARTERIES:  In adequate tricuspid regurgitation to evaluate pulmonary artery systolic pressures. HISTORY: PRIOR HISTORY: DM II, CAD with stent placement, ARLEEN, CHF, CKD, Alzheimer's disease    PROCEDURE: The procedure was performed at the bedside. This was a routine study. The transthoracic approach was used. The study included complete 2D imaging, M-mode, complete spectral Doppler, and color Doppler. Image quality was adequate. LEFT VENTRICLE: Normal left ventricular systolic function, EF 37%. Normal left ventricular cavity size. Moderate concentric left ventricular hypertrophy. Normal left ventricular wall motion without regional wall motion abnormalities. Normal left ventricular diastolic function. Normal left atrial pressures. RIGHT VENTRICLE: The size was normal. Systolic function was normal. Wall thickness was normal.    LEFT ATRIUM: Mild left atrial enlargement. RIGHT ATRIUM: Mild right atrial enlargement. Left atrium larger than right atrium. MITRAL VALVE: Valve structure was normal. There was normal leaflet separation.  DOPPLER: The transmitral velocity was within the normal range. There was no evidence for stenosis. There was no regurgitation. AORTIC VALVE: The aortic valve is tricuspid. The non coronary cusp is heavily calcified and the left coronary cusp is mild-to-moderately calcified. All 3 leaflets have good excursion. There is no aortic stenosis or regurgitation. TRICUSPID VALVE: The valve structure was normal. There was normal leaflet separation. DOPPLER: The transtricuspid velocity was within the normal range. There was no evidence for stenosis. There was trace regurgitation. PULMONIC VALVE: Leaflets exhibited normal thickness, no calcification, and normal cuspal separation. DOPPLER: The transpulmonic velocity was within the normal range. There was no regurgitation. PERICARDIUM: There was no pericardial effusion. The pericardium was normal in appearance. AORTA: The root exhibited normal size. PULMONARY ARTERY: In adequate tricuspid regurgitation to evaluate pulmonary artery systolic pressures. SYSTEM MEASUREMENT TABLES    2D  %FS: 40.9 %  Ao Diam: 3 cm  EF(Teich): 71.8 %  ESV(Teich): 28.1 ml  IVSd: 1.4 cm  LA Diam: 4.2 cm  LVEF MOD A4C: 67.1 %  LVIDd: 4.6 cm  LVIDs: 2.7 cm  LVPWd: 1.5 cm  SV(Teich): 71.5 ml    [employer] Accredited Echocardiography Laboratory    Prepared and electronically signed by    Estrellita Ko MD  Signed 12-Aug-2019 15:43:17    No results found for this or any previous visit. No results found for this or any previous visit. No valid procedures specified. No results found for this or any previous visit.         Meds/Allergies   all current active meds have been reviewed  Medications Prior to Admission   Medication   • albuterol (ACCUNEB) 1.25 MG/3ML nebulizer solution   • ARIPiprazole (ABILIFY) 5 mg tablet   • atorvastatin (LIPITOR) 40 mg tablet   • baclofen 10 mg tablet   • CALCIUM PO   • carvedilol (COREG) 12.5 mg tablet   • Cholecalciferol (VITAMIN D3) 5000 units CAPS   • clopidogrel (PLAVIX) 75 mg tablet   • Cyanocobalamin (VITAMIN B-12 PO)   • ergocalciferol (VITAMIN D2) 50,000 units   • fludrocortisone (FLORINEF) 0.1 mg tablet   • folic acid (FOLVITE) 1 mg tablet   • gabapentin (NEURONTIN) 300 mg capsule   • gabapentin (NEURONTIN) 600 MG tablet   • hydrocortisone 1 % lotion   • hydrOXYzine HCL (ATARAX) 25 mg tablet   • memantine (NAMENDA) 10 mg tablet   • methocarbamol (ROBAXIN) 750 mg tablet   • Morphine Sulfate Microinfusion (MORPHINE SULF MICROINFUSION PF IJ)   • nitroglycerin (NITROSTAT) 0.4 mg SL tablet   • nystatin (MYCOSTATIN) cream   • omeprazole (PriLOSEC) 20 mg delayed release capsule   • omeprazole (PriLOSEC) 40 MG capsule   • ondansetron (ZOFRAN) 4 mg tablet   • oxyCODONE-acetaminophen (PERCOCET) 5-325 mg per tablet   • Pediatric Multivit-Minerals-C (Polyvitamin/Iron) CHEW   • POTASSIUM PO   • prochlorperazine (COMPAZINE) 10 mg tablet   • sertraline (ZOLOFT) 100 mg tablet   • sucralfate (CARAFATE) 1 g/10 mL suspension   • tamsulosin (FLOMAX) 0.4 mg   • traZODone (DESYREL) 100 mg tablet   • ULTICARE SHORT PEN NEEDLES 31G X 8 MM MISC       No current facility-administered medications for this encounter. Counseling / Coordination of Care  Total floor / unit time spent today 20 minutes. Greater than 50% of total time was spent with the patient and / or family counseling and / or coordination of care. ** Please Note: Dragon 360 Dictation voice to text software may have been used in the creation of this document.  **

## 2023-08-18 NOTE — Clinical Note
Patient received direct to the Cath Lab by EMS. Patient states he currently has chest pain rated 10/10. Patient alert and oriented x3.

## 2023-08-18 NOTE — EMTALA/ACUTE CARE TRANSFER
[facility] EMERGENCY DEPARTMENT  [address] [facility] [address]  Dept: [phone]      EMTALA TRANSFER CONSENT    NAME Stephanie HANSEN 1960                              MRN [id number]    I have been informed of my rights regarding examination, treatment, and transfer   by Dr. Chilo De Oliveira DO    Benefits: Specialized equipment and/or services available at the receiving facility (Include comment)________________________    Risks: Potential for delay in receiving treatment      Consent for Transfer:  I acknowledge that my medical condition has been evaluated and explained to me by the emergency department physician or other qualified medical person and/or my attending physician, who has recommended that I be transferred to the service of  Accepting Physician: Dr. Scott Mojica at [address] [place] [address] [address] Name, [facility] : [facility]. The above potential benefits of such transfer, the potential risks associated with such transfer, and the probable risks of not being transferred have been explained to me, and I fully understand them. The doctor has explained that, in my case, the benefits of transfer outweigh the risks. I agree to be transferred. I authorize the performance of emergency medical procedures and treatments upon me in both transit and upon arrival at the receiving facility. Additionally, I authorize the release of any and all medical records to the receiving facility and request they be transported with me, if possible. I understand that the safest mode of transportation during a medical emergency is an ambulance and that the Hospital advocates the use of this mode of transport. Risks of traveling to the receiving facility by car, including absence of medical control, life sustaining equipment, such as oxygen, and medical personnel has been explained to me and I fully understand them.     ([address])  [  ]  I consent to the stated transfer and to be transported by ambulance/helicopter. [  ]  I consent to the stated transfer, but refuse transportation by ambulance and accept full responsibility for my transportation by car. I understand the risks of non-ambulance transfers and I exonerate the Hospital and its staff from any deterioration in my condition that results from this refusal.    X___________________________________________    DATE  23  TIME________  Signature of patient or legally responsible individual signing on patient behalf           RELATIONSHIP TO PATIENT_________________________          Provider Certification    NAME Tan Aguila                                         1960                              MRN [id number]    A medical screening exam was performed on the above named patient. Based on the examination:    Condition Necessitating Transfer The primary encounter diagnosis was ST segment depression. Diagnoses of ACS (acute coronary syndrome) ([address]) and STEMI (ST elevation myocardial infarction) ([address]) were also pertinent to this visit.     Patient Condition: The patient has been stabilized such that within reasonable medical probability, no material deterioration of the patient condition or the condition of the unborn child(jc) is likely to result from the transfer    Reason for Transfer: Level of Care needed not available at this facility    Transfer Requirements: Facility [facility]   · Space available and qualified personnel available for treatment as acknowledged by    · Agreed to accept transfer and to provide appropriate medical treatment as acknowledged by       Dr. Chiara Mayen  · Appropriate medical records of the examination and treatment of the patient are provided at the time of transfer   [address] _______  · Transfer will be performed by qualified personnel from    and appropriate transfer equipment as required, including the use of necessary and appropriate life support measures. Provider Certification: I have examined the patient and explained the following risks and benefits of being transferred/refusing transfer to the patient/family:  General risk, such as traffic hazards, adverse weather conditions, rough terrain or turbulence, possible failure of equipment (including vehicle or aircraft), or consequences of actions of persons outside the control of the transport personnel      Based on these reasonable risks and benefits to the patient and/or the unborn child(jc), and based upon the information available at the time of the patient’s examination, I certify that the medical benefits reasonably to be expected from the provision of appropriate medical treatments at another medical facility outweigh the increasing risks, if any, to the individual’s medical condition, and in the case of labor to the unborn child, from effecting the transfer.     X____________________________________________ DATE 08/18/23        TIME_______      ORIGINAL - SEND TO MEDICAL RECORDS   COPY - SEND WITH PATIENT DURING TRANSFER

## 2023-08-18 NOTE — ASSESSMENT & PLAN NOTE
· Has a pump - medication recently changed from morphine to fentanyl  · Cont home gabapentin 600 QID, baclofen 10 TID  · F/u OP with pain Mx

## 2023-08-18 NOTE — CONSULTS
Consult - Cardiology   Frannie Cantu 63 y.o. male MRN: [id number]  Unit/Bed#: ED 04 Encounter: [id number]        Reason For Consult: Chest pain  Outpatient Cardiologist: [facility]               ASSESSMENT:  1. Chest pain, acute EKG changes concerning for ACS  a. Initial troponin negative however serial EKGs showing anterior ST changes with reciprocal inferolateral ST depression concerning for ACS  b. Patient being transferred for urgent cardiac catheterization  2. History of coronary artery disease  a. 2010 per patient had his first MI with stenting of unknown vessel  b. 2015 [facility]: Distal LAD 85%, proximal circumflex 80%, distal circumflex 90%, first obtuse marginal 80%, mid RCA 40%, distal RCA 40%, PCI/OMER x1 to proximal circumflex  c. 2017 [facility]: Distal LAD 95%, proximal circumflex stent patent, mid RCA 85%, PCI/OMER x1 to mid RCA  d. 2020 [facility]: LM normal, proximal LAD 85%, mid LAD 5-10%, circumflex 20% mid lesion, obtuse marginal branches luminal irregularities, proximal RCA 20%, distal RCA 30%, PCI/OMER x1 to proximal LAD  e. 2022 [facility]: LM luminal irregularities, ostial LAD 30%, proximal LAD stent patent, mid LAD 80% stenosis, ostial to mid circumflex 20%, obtuse marginal branch with luminal irregularities, proximal RCA 20%, distal RCA 30%, RPDA 50%, PCI/OMER x1 in proximal to mid LAD  3. Preserved LV systolic function  a. 7/7148 [facility]: EF 60 to 65%, RV normal size and systolic function, normal biatrial size, no hemodynamically significant valvular disease  4. Type 2 diabetes    PLAN/ DISCUSSION:     • Aspirin 324  • Brilinta 180  • IV heparin bolus and drip  • Pain control  • Echocardiogram  • Transfer for urgent cardiac catheterization    History Of Present Illness:  Micah Varela is a pleasant 30-year-old gentleman with a long history of coronary artery disease for which he tells me he had his first heart attack around 2010.   He has since that time according to his knowledge received at least 6 stents on other occasions. Some of these are outlined above. Most of his care has gone between [facility] and  [facility] having had cardiac catheterizations in both that works. He had previously lived in [facility] for [place] years but recently moved to the [facility] area. It looks like he follows with a primary care physician at [facility]. It does not look like he follows regularly with a cardiologist.    He does take nitroglycerin on a fairly regular basis perhaps 1-2 times weekly. He says this has been the case for quite some time probably several years. He understands that he has some residual coronary artery disease and tries not to exert himself too much. He knows that too much stress or physical activity could bring on some angina. He tells me that in general his symptoms are well controlled and he maintains good quality of life being independent of his ADLs. 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 He woke up this morning in his normal state of health. He was laying in bed watching TV drinking coffee. He had sudden onset severe chest pain. This started in the middle of his chest and radiates to the left side of his chest.  At first it was a sharp pain but then developed to a dull pain. With this he had some numbness and tingling in his left arm. He felt nauseous. He felt short of breath. Due to the exact same symptoms as his prior heart attacks. He tried taking 4 nitroglycerin tabs but they did not work. He then noticed that the prescription was . He got into his car immediately and drove as quickly as he could to the hospital.  Here in the hospital he was given fentanyl, Dilaudid, and nitroglycerin. His pain continues to be a 9/10. Serial EKGs have showed evolving ST changes in anterior leads.   This was discussed with interventional cardiology and he is being transferred for urgent cardiac catheterization.     Past Medical History:        Past Medical History:   Diagnosis Date   • Acute on chronic diastolic congestive heart failure (HCC)    • Altered gait    • Alzheimer disease ([facility])     per patients,,early onset    • Angina pectoris ([facility])    • Anxiety    • Arthritis    • Brain aneurysm     coils placed   • Cardiac disease    • Chest pain 1/13/2016   • Chronic kidney disease    • Chronic pain     back/ right groin and rle- has morphine pump   • Constipation    • COPD (chronic obstructive pulmonary disease) ([facility])    • Coronary artery disease    • CPAP (continuous positive airway pressure) dependence    • Decubital ulcer     sacral decub-occured 5/2019-sees wound care/debide in OR today 6/6/2019   • Dependent on walker for ambulation     w/c for long distance   • Depression    • Diabetes mellitus ([facility])     insulin dependent   • Dizziness     occ   • Dysphagia    • Enlarged prostate    • Fall    • GERD (gastroesophageal reflux disease)    • Heart failure ([address])    • Hiatal hernia    • Hx of gastric bypass 11/19/2018   • Hypercholesterolemia    • Hypertension    • MI (myocardial infarction) ([address])     2017- stents x2   • Migraine    • Morbid obesity ([address])     gastric bypass sleeve 11/2018-wt loss 125 lb   • Neuropathy    • Oxygen dependent     Q HS  2LPM with CPAP and prn during day 2-3 LPM    • Pressure injury of skin    • Renal disorder    • Shortness of breath    • Skin abnormality     sacral wound - covered with pad   • Sleep apnea    • Stented coronary artery    • Stroke ([address])     vision loss b/l  2005, residual R leg weakness   • Urinary frequency    • Use of cane as ambulatory aid    • Wears dentures    • Wears glasses    • Wears glasses    • Wheelchair dependent       Past Surgical History:   Procedure Laterality Date   • BACK SURGERY     • BRAIN SURGERY     • CARDIAC CATHETERIZATION      with stents   • CEREBRAL ANEURYSM REPAIR      with coils   • COLONOSCOPY • ESOPHAGOGASTRODUODENOSCOPY N/A 7/1/2016    Procedure: ESOPHAGOGASTRODUODENOSCOPY (EGD); Surgeon: Lenore Julien MD;  Location: [facility]; Service:    • GASTRIC BYPASS  11/19/2018   • HERNIA REPAIR     • HIATAL HERNIA REPAIR     • INFUSION PUMP IMPLANTATION Left     morphine   • INTRATHECAL PUMP IMPLANTATION Left 7/9/2020    Procedure: REVISION INTRATHECAL PAIN PUMP POCKET, LEFT ABDOMEN;  Surgeon: Marylin Hobbs MD;  [facility];  Service: Neurosurgery   • KNEE ARTHROSCOPY Right    • KNEE ARTHROSCOPY Right    • PERONEAL NERVE DECOMPRESSION Right    • PA DEBRIDEMENT OPEN WOUND 20 SQ CM/< N/A 6/6/2019    Procedure: EXCISIONAL DEBRIDEMENT OF SACRAL DECUBITUS ULCER;  Surgeon: Cheree Galeazzi, MD;  [facility];  Service: General   • PA ESOPHAGOGASTRODUODENOSCOPY TRANSORAL DIAGNOSTIC N/A 2/27/2017    Procedure: ESOPHAGOGASTRODUODENOSCOPY (EGD); Surgeon: Lenore Julien MD;  Location: [facility]; Service: Gastroenterology   • PA ESOPHAGOGASTRODUODENOSCOPY TRANSORAL DIAGNOSTIC N/A 8/23/2018    Procedure: ESOPHAGOGASTRODUODENOSCOPY (EGD) with biopsy;  Surgeon: Lenore Julien MD;  [facility]: [facility];   Service: Gastroenterology   • PA IMPLTJ/RPLCMT ITHCL/EDRL DRUG NFS PRGRBL PUMP Left 10/13/2020    Procedure: EXPLORATION OF INTRATHECAL PAIN PUMP SYSTEM INTEGRITY FOR POSSIBLE REPLACEMENT OF CATHETER AND PUMP.;  Surgeon: Marylin Hobbs MD;  Location: [facility];  Service: Neurosurgery   • PA IMPLTJ/RPLCMT ITHCL/EDRL DRUG NFS SUBQ RSVR N/A 1/19/2017    Procedure: REMOVAL / EXCHANGE INTRATHECAL PAIN PUMP;  Surgeon: Obdulio Meadows MD;  [facility];  Service: Orthopedics   • PA IMPLTJ/RPLCMT ITHCL/EDRL DRUG NFS SUBQ RSVR N/A 5/16/2016    Procedure: REPLACEMENT AND PROGRAM PUMP ;  Surgeon: Obdulio Meadows MD;  [facility];  Service: Orthopedics   • PA PRQ IMPLTJ NSTIM ELECTRODE ARRAY EPIDURAL Right 3/17/2021    Procedure: INSERTION THORACIC DORSAL COLUMN SPINAL CORD STIMULATOR PERCUTANEOUS W IMPLANTABLE PULSE GENERATOR, RIGHT;  Surgeon: Palomo Love MD;  [facility];  Service: Neurosurgery        Allergy:        No Known Allergies    Medications:       Prior to Admission medications    Medication Sig Start Date End Date Taking? Authorizing Provider   carvedilol (COREG) 12.5 mg tablet TAKE 2 TABLETS BY MOUTH 2 TIMES A DAY WITH MEALS. 8/15/23  Yes Historical Provider, MD   albuterol (ACCUNEB) 1.25 MG/3ML nebulizer solution Take 1 ampule by nebulization every 6 (six) hours as needed for wheezing    Historical Provider, MD   ARIPiprazole (ABILIFY) 5 mg tablet Take 10 mg by mouth daily  8/6/20   Historical Provider, MD   atorvastatin (LIPITOR) 40 mg tablet Take 40 mg by mouth daily.     Historical Provider, MD   baclofen 10 mg tablet Take 10 mg by mouth 3 (three) times a day    Historical Provider, MD   CALCIUM PO Take 1 tablet by mouth daily    Historical Provider, MD   Cholecalciferol (VITAMIN D3) 5000 units CAPS Take 1 capsule (5,000 Units total) by mouth daily  Patient not taking: Reported on 5/12/2021 10/9/18   Rashid Christianson PA-C   clopidogrel (PLAVIX) 75 mg tablet Take 75 mg by mouth daily    Historical Provider, MD   Cyanocobalamin (VITAMIN B-12 PO) Take 1 tablet by mouth daily    Historical Provider, MD   ergocalciferol (VITAMIN D2) 50,000 units Take 50,000 Units by mouth once a week 1/25/19   Historical Provider, MD   fludrocortisone (FLORINEF) 0.1 mg tablet Take 1 tablet (0.1 mg total) by mouth daily 8/13/19   Josefina Rice MD   folic acid (FOLVITE) 1 mg tablet Take 1 mg by mouth daily  1/25/19   Historical Provider, MD   gabapentin (NEURONTIN) 300 mg capsule TAKE 1 CAPSULE (300 MG TOTAL) BY MOUTH AS NEEDED (MIGRAINE) 11/8/19   Rashid Christianson PA-C   gabapentin (NEURONTIN) 600 MG tablet Take 600 mg by mouth 3 (three) times a day 1/9/20   Historical Provider, MD   hydrocortisone 1 % lotion APPLY TOPICALLY 2 (TWO) TIMES A DAY INDICATIONS  USING ON FEET. 7/1/20   Historical Provider, MD hydrOXYzine HCL (ATARAX) 25 mg tablet Take 75 mg by mouth 2 (two) times a day as needed for anxiety     Historical Provider, MD   memantine (NAMENDA) 10 mg tablet TAKE 1 TABLET (10 MG TOTAL) BY MOUTH 2 (TWO) TIMES A DAY. 8/7/20   Historical Provider, MD   methocarbamol (ROBAXIN) 750 mg tablet Take 1 tablet (750 mg total) by mouth every 6 (six) hours as needed for muscle spasms 3/16/21   Rita Mcclain MD   Morphine Sulfate Microinfusion (MORPHINE SULF MICROINFUSION PF IJ) Inject 14 mg as directed daily Via infusion pump    Historical Provider, MD   nitroglycerin (NITROSTAT) 0.4 mg SL tablet Place 0.4 mg under the tongue every 5 (five) minutes as needed for chest pain (pt has not  used recently).       Historical Provider, MD   nystatin (MYCOSTATIN) cream Apply 1 application topically 2 (two) times a day 1/11/20   Historical Provider, MD   omeprazole (PriLOSEC) 20 mg delayed release capsule omeprazole 20 mg capsule,delayed release    Historical Provider, MD   omeprazole (PriLOSEC) 40 MG capsule Take 40 mg by mouth daily    Historical Provider, MD   ondansetron (ZOFRAN) 4 mg tablet Take 1 tablet (4 mg total) by mouth every 8 (eight) hours as needed for nausea or vomiting 8/11/21   Mitchell Avalos PA-C   oxyCODONE-acetaminophen (PERCOCET) 5-325 mg per tablet Take 1 tablet by mouth every 4 (four) hours as needed for severe painMax Daily Amount: 6 tablets  Patient not taking: Reported on 5/12/2021 3/16/21   Rita Mcclain MD   Pediatric [address] [facility] [address] (Polyvitamin/Iron) CHEW Chew 1 tablet daily 8/9/20   Historical Provider, MD   POTASSIUM PO Take 40 mEq by mouth 2 (two) times a day    Historical Provider, MD   prochlorperazine (COMPAZINE) 10 mg tablet Take 1 tablet (10 mg total) by mouth every 6 (six) hours as needed for nausea or vomiting (migraine) 10/18/19   Amanda Ren PA-C   sertraline (ZOLOFT) 100 mg tablet Take 1.5 tablets (150 mg total) by mouth daily  Patient not taking: Reported on 8/11/2021 1/31/20   Heather Ora Porter Velazquez MD   sucralfate (CARAFATE) 1 g/10 mL suspension Take 10 mL (1 g total) by mouth 4 (four) times a day 9/8/21   Parmjit Varghese MD   tamsulosin (FLOMAX) 0.4 mg Take 0.4 mg by mouth daily with dinner.     Historical Provider, MD   traZODone (DESYREL) 100 mg tablet Take 2 tablets (200 mg total) by mouth daily at bedtime as needed for sleep  Patient not taking: Reported on 8/11/2021 1/31/20   Liz Ochoa MD   [employer] SHORT PEN NEEDLES 31G X 8 MM MISC 4 INJECTIONS PER DAY DX  E11.8 8/28/18   Historical Provider, MD       Family History:     Family History   Problem Relation Age of Onset   • Diabetes unspecified Mother    • Diabetes unspecified Brother    • Diabetes unspecified Paternal Uncle    • Diabetes unspecified Maternal Grandmother    • Diabetes unspecified Paternal Grandmother    • Diabetes unspecified Brother    • Heart attack Father         Social History:       Social History     Socioeconomic History   • Marital status: /Civil Union     Spouse name: Priya Ceron   • Number of children: 5   • Years of education: None   • Highest education level: GED or equivalent   Occupational History   • None   Tobacco Use   • Smoking status: Never   • Smokeless tobacco: Never   Vaping Use   • Vaping Use: Never used   Substance and Sexual Activity   • Alcohol use: Not Currently   • Drug use: Not Currently     Types: Marijuana   • Sexual activity: Not Currently   Other Topics Concern   • None   Social History Narrative   • None     Social Determinants of Health     Financial Resource Strain: High Risk (8/28/2020)    Overall Financial Resource Strain (CARDIA)    • Difficulty of Paying Living Expenses: Very hard   Food Insecurity: Food Insecurity Present (8/28/2020)    Hunger Vital Sign    • Worried About Running Out of Food in the Last Year: Sometimes true    • Ran Out of Food in the Last Year: Sometimes true   Transportation Needs: Unmet Transportation Needs (8/28/2020)    PRAPARE - Transportation    • Lack of Transportation (Medical): No    • Lack of Transportation (Non-Medical): Yes   Physical Activity: Inactive (9/6/2019)    Exercise Vital Sign    • Days of Exercise per Week: 0 days    • Minutes of Exercise per Session: 0 min   Stress: Stress Concern Present (9/6/2019)    [address]    • Feeling of Stress : Very much   Social Connections: Moderately Isolated (9/6/2019)    Social Connection and Isolation Panel [NHANES]    • Frequency of Communication with Friends and Family: Never    • Frequency of Social Gatherings with Friends and Family: Twice a week    • Attends [employer] Services: 1 to 4 times per year    • Active Member of Clubs or Organizations: No    • Attends Club or Organization Meetings: Never    • Marital Status:    Intimate Partner Violence: Not At Risk (9/6/2019)    Humiliation, Afraid, Rape, and Kick questionnaire    • Fear of Current or Ex-Partner: No    • Emotionally Abused: No    • Physically Abused: No    • Sexually Abused: No   Housing Stability: Not on file       ROS:  14 point ROS negative except as outlined above  Remainder review of systems is negative    Exam:  General:  alert, oriented and in no distress, cooperative  Head: Normocephalic, atraumatic. Eyes:  EOMI. Pupils - equal, round, reactive to accomodation. No icterus. Normal Conjunctiva.    Oropharynx: moist and normal-appearing mucosa  Neck: supple, symmetrical, trachea midline and no JVD  Heart:  RRR, No: murmer, rub or gallop, S1 & S2 normal   Respiratory effort / Chest Inspection: unlabored  Lungs:  normal air entry, lungs clear to auscultation and no rales, rhonchi or wheezing   Abdomen: flat, normal findings: bowel sounds normal and soft, non-tender  Lower Limbs:  no pitting edema  Pulses[de-identified]  RLE - DP: present 2+                 LLE - DP: present 2+  Musculoskeletal: ROM grossly normal        DATA:      ECG:                     Telemetry: Rhythm seems to go between sinus and possible AIVR          Echocardiogram:           Ischemic Testing:         Weights: Wt Readings from Last 3 Encounters:   08/18/23 (!) 139 kg (305 lb 12.5 oz)   07/09/22 116 kg (255 lb)   08/11/21 118 kg (261 lb 3.2 oz)   , Body mass index is 41.47 kg/m².          Lab Studies:             Results from last 7 days   Lab Units 08/18/23  0818   WBC Thousand/uL 13.75*   HEMOGLOBIN g/dL 15.1   HEMATOCRIT % 46.1   PLATELETS Thousands/uL 270   ,   Results from last 7 days   Lab Units 08/18/23  0818   POTASSIUM mmol/L 3.6   CHLORIDE mmol/L 106   CO2 mmol/L 25   BUN mg/dL 9   CREATININE mg/dL 0.99   CALCIUM mg/dL 9.3

## 2023-08-18 NOTE — PLAN OF CARE
Problem: CARDIOVASCULAR - ADULT  Goal: Maintains optimal cardiac output and hemodynamic stability  Description: INTERVENTIONS:  - Monitor I/O, vital signs and rhythm  - Monitor for S/S and trends of decreased cardiac output  - Administer and titrate ordered vasoactive medications to optimize hemodynamic stability  - Assess quality of pulses, skin color and temperature  - Assess for signs of decreased coronary artery perfusion  - Instruct patient to report change in severity of symptoms  Outcome: Progressing  Goal: Absence of cardiac dysrhythmias or at baseline rhythm  Description: INTERVENTIONS:  - Continuous cardiac monitoring, vital signs, obtain 12 lead EKG if ordered  - Administer antiarrhythmic and heart rate control medications as ordered  - Monitor electrolytes and administer replacement therapy as ordered  Outcome: Progressing     Problem: RESPIRATORY - ADULT  Goal: Achieves optimal ventilation and oxygenation  Description: INTERVENTIONS:  - Assess for changes in respiratory status  - Assess for changes in mentation and behavior  - Position to facilitate oxygenation and minimize respiratory effort  - Oxygen administered by appropriate delivery if ordered  - Initiate smoking cessation education as indicated  - Encourage broncho-pulmonary hygiene including cough, deep breathe, Incentive Spirometry  - Assess the need for suctioning and aspirate as needed  - Assess and instruct to report SOB or any respiratory difficulty  - Respiratory Therapy support as indicated  Outcome: Progressing     Problem: PAIN - ADULT  Goal: Verbalizes/displays adequate comfort level or baseline comfort level  Description: Interventions:  - Encourage patient to monitor pain and request assistance  - Assess pain using appropriate pain scale  - Administer analgesics based on type and severity of pain and evaluate response  - Implement non-pharmacological measures as appropriate and evaluate response  - Consider cultural and social influences on pain and pain management  - Notify physician/advanced practitioner if interventions unsuccessful or patient reports new pain  Outcome: Progressing     Problem: Knowledge Deficit  Goal: Patient/family/caregiver demonstrates understanding of disease process, treatment plan, medications, and discharge instructions  Description: Complete learning assessment and assess knowledge base.   Interventions:  - Provide teaching at level of understanding  - Provide teaching via preferred learning methods  Outcome: Progressing

## 2023-08-18 NOTE — ASSESSMENT & PLAN NOTE
Lab Results   Component Value Date    HGBA1C 5.3 07/14/2022       No results for input(s): "POCGLU" in the last 72 hours.     Blood Sugar Average: Last 72 hrs:  · improved after gastric sleeve  · Not on meds anymore

## 2023-08-18 NOTE — H&P
[address] [facility]  H&P  Name: Nya Mittal 63 y.o. male I MRN: [id number]  Unit/Bed#: -01 I Date of Admission: 8/18/2023   Date of Service: 8/18/2023 I Hospital Day: 0      Assessment/Plan   * Chest pain  Assessment & Plan  · Presented to [facility] with CP, concern for anterior STEMI hence sent to [facility] -> underwent urgent LHC  •  Dist LM lesion is 50% stenosed. •  Ost LAD lesion is 40% stenosed. •  Mid LAD lesion is 40% stenosed. •  Mid Cx lesion is 20% stenosed  · Per cardiology do not suspect ischemic pain, suspect 2/2 medication noncompliance & high BP  · Started on nitro drip by cardio for BP Mx, started on home PO coreg, amlodipine, lisinopril added by cardio  · Troponin trending up to 16k -> cardio made aware  · Echo pending  · Ordered tylenol & oxycodone for pain control    Major depressive disorder, recurrent, moderate (HCC)  Assessment & Plan  · Per pt not symptomatic anymore & not on meds    Type 2 diabetes mellitus with diabetic neuropathy, with long-term current use of insulin (HCC)  Assessment & Plan  Lab Results   Component Value Date    HGBA1C 5.3 07/14/2022       No results for input(s): "POCGLU" in the last 72 hours.     Blood Sugar Average: Last 72 hrs:  · improved after gastric sleeve  · Not on meds anymore    Chronic pain disorder  Assessment & Plan  · Has a pump - medication recently changed from morphine to fentanyl  · Cont home gabapentin 600 QID, baclofen 10 TID  · F/u OP with pain Mx    Coronary artery disease involving native coronary artery  Assessment & Plan  · H/o PCI x 6  · Ran out of plavix, statin, BB & since PCP left the practice couldn't get meds refilled    Chronic diastolic congestive heart failure [facility])  Assessment & Plan  Wt Readings from Last 3 Encounters:   08/18/23 (!) 139 kg (305 lb 12.5 oz)   07/09/22 116 kg (255 lb)   08/11/21 118 kg (261 lb 3.2 oz)     · S/p IV lasix x 1 in cath lab  · Appears stable             VTE Pharmacologic Prophylaxis: VTE Score: 5 Moderate Risk (Score 3-4) - Pharmacological DVT Prophylaxis Ordered: enoxaparin (Lovenox). Code Status: Level 1 - Full Code   Discussion with family: patient. Anticipated Length of Stay: Patient will be admitted on an inpatient basis with an anticipated length of stay of greater than 2 midnights secondary to CP management, BP Mx, . Total Time Spent on Date of Encounter in care of patient: 65 minutes This time was spent on one or more of the following: performing physical exam; counseling and coordination of care; obtaining or reviewing history; documenting in the medical record; reviewing/ordering tests, medications or procedures; communicating with other healthcare professionals and discussing with patient's family/caregivers. Chief Complaint: chest pain    History of Present Illness:  Jillian Velazquez is a 63 y.o. male with a PMH of MI s/p PCI x 6, h/o hypoxic res pfailure, h/o depression, chronic pain, continuous opioid dependance, h/o DM who presented to [facility] ED today AM with c/o chest pain, located all over anteriorly, pressure like, radiating to R arm, a/w nausea, vomiting, SOB, dizziness hence came to ED. Per pt has h/o MI with PCI x 6, ran out of cardiac meds few months ago but PCP left practifce so couldn't get refill. Gets PCs every couple days more so when he is agitated & better after he calms down or with nitro. At [facility] there was concern for anterior STEMI was sent here for urgent LHC - which didn't show any significant blockage. Cardio wants suspects med noncompliance & elevated BP realted. upon my eval, still in pain 7/10 compared to 15/10 before, wants the pain to go away. States his PCP left practice & he has no one to f/u with    Review of Systems:  Review of Systems   Constitutional: Negative for activity change, appetite change, chills and fever. HENT: Negative for congestion and rhinorrhea.     Respiratory: Positive for shortness of breath. Cardiovascular: Positive for chest pain. Negative for palpitations and leg swelling. Gastrointestinal: Positive for constipation, nausea and vomiting. Neurological: Positive for dizziness. Negative for headaches. All other systems reviewed and are negative.       Past Medical and Surgical History:   Past Medical History:   Diagnosis Date   • Acute on chronic diastolic congestive heart failure ([address])    • Altered gait    • Alzheimer disease ([address])     per patients,,early onset    • Angina pectoris ([address])    • Anxiety    • Arthritis    • Brain aneurysm     coils placed   • Cardiac disease    • Chest pain 1/13/2016   • Chronic kidney disease    • Chronic pain     back/ right groin and rle- has morphine pump   • Constipation    • COPD (chronic obstructive pulmonary disease) ([facility])    • Coronary artery disease    • CPAP (continuous positive airway pressure) dependence    • Decubital ulcer     sacral decub-occured 5/2019-sees wound care/debide in OR today 6/6/2019   • Dependent on walker for ambulation     w/c for long distance   • Depression    • Diabetes mellitus ([facility])     insulin dependent   • Dizziness     occ   • Dysphagia    • Enlarged prostate    • Fall    • GERD (gastroesophageal reflux disease)    • Heart failure ([address])    • Hiatal hernia    • Hx of gastric bypass 11/19/2018   • Hypercholesterolemia    • Hypertension    • MI (myocardial infarction) ([address])     2017- stents x2   • Migraine    • Morbid obesity ([facility])     gastric bypass sleeve 11/2018-wt loss 125 lb   • Neuropathy    • Oxygen dependent     Q HS  2LPM with CPAP and prn during day 2-3 LPM    • Pressure injury of skin    • Renal disorder    • Shortness of breath    • Skin abnormality     sacral wound - covered with pad   • Sleep apnea    • Stented coronary artery    • Stroke [facility])     vision loss b/l  2005, residual R leg weakness   • Urinary frequency    • Use of cane as ambulatory aid    • Wears dentures    • Wears glasses    • Wears glasses • Wheelchair dependent        Past Surgical History:   Procedure Laterality Date   • BACK SURGERY     • BRAIN SURGERY     • CARDIAC CATHETERIZATION      with stents   • CEREBRAL ANEURYSM REPAIR      with coils   • COLONOSCOPY     • ESOPHAGOGASTRODUODENOSCOPY N/A 7/1/2016    Procedure: ESOPHAGOGASTRODUODENOSCOPY (EGD); Surgeon: Yasmine Huang MD;  Location: [facility]; Service:    • GASTRIC BYPASS  11/19/2018   • HERNIA REPAIR     • HIATAL HERNIA REPAIR     • INFUSION PUMP IMPLANTATION Left     morphine   • INTRATHECAL PUMP IMPLANTATION Left 7/9/2020    Procedure: REVISION INTRATHECAL PAIN PUMP POCKET, LEFT ABDOMEN;  Surgeon: Rolando Kyle MD;  Location: [facility];  Service: Neurosurgery   • KNEE ARTHROSCOPY Right    • KNEE ARTHROSCOPY Right    • PERONEAL NERVE DECOMPRESSION Right    • VA DEBRIDEMENT OPEN WOUND 20 SQ CM/< N/A 6/6/2019    Procedure: EXCISIONAL DEBRIDEMENT OF SACRAL DECUBITUS ULCER;  Surgeon: Anel Blakely MD;  Location: [facility];  Service: General   • VA ESOPHAGOGASTRODUODENOSCOPY TRANSORAL DIAGNOSTIC N/A 2/27/2017    Procedure: ESOPHAGOGASTRODUODENOSCOPY (EGD); Surgeon: Yasmine Huang MD;  Location: [facility]; Service: Gastroenterology   • VA ESOPHAGOGASTRODUODENOSCOPY TRANSORAL DIAGNOSTIC N/A 8/23/2018    Procedure: ESOPHAGOGASTRODUODENOSCOPY (EGD) with biopsy;  Surgeon: Yasmine Huang MD;  Location: [facility];   Service: Gastroenterology   • VA IMPLTJ/RPLCMT ITHCL/EDRL DRUG NFS PRGRBL PUMP Left 10/13/2020    Procedure: EXPLORATION OF INTRATHECAL PAIN PUMP SYSTEM INTEGRITY FOR POSSIBLE REPLACEMENT OF CATHETER AND PUMP.;  Surgeon: Rolando Kyle MD;  Location: [facility];  Service: Neurosurgery   • VA IMPLTJ/RPLCMT ITHCL/EDRL DRUG NFS SUBQ RSVR N/A 1/19/2017    Procedure: REMOVAL / EXCHANGE INTRATHECAL PAIN PUMP;  Surgeon: Omayra See MD;  [facility];  Service: Orthopedics   • VA IMPLTJ/RPLCMT ITHCL/EDRL DRUG NFS SUBQ RSVR N/A 5/16/2016    Procedure: REPLACEMENT AND PROGRAM PUMP ; Surgeon: Nimisha Casas MD;  [facility];  Service: Orthopedics   • MD PRQ IMPLTJ NSTIM ELECTRODE ARRAY EPIDURAL Right 3/17/2021    Procedure: INSERTION THORACIC DORSAL COLUMN SPINAL CORD STIMULATOR PERCUTANEOUS [place], RIGHT;  Surgeon: Coleen Ca MD;  Location: [facility];  Service: Neurosurgery       Meds/Allergies:  Prior to Admission medications    Medication Sig Start Date End Date Taking? Authorizing Provider   albuterol (ACCUNEB) 1.25 MG/3ML nebulizer solution Take 1 ampule by nebulization every 6 (six) hours as needed for wheezing    Historical Provider, MD   ARIPiprazole (ABILIFY) 5 mg tablet Take 10 mg by mouth daily  8/6/20   Historical Provider, MD   atorvastatin (LIPITOR) 40 mg tablet Take 40 mg by mouth daily. Historical Provider, MD   baclofen 10 mg tablet Take 10 mg by mouth 3 (three) times a day    Historical Provider, MD   CALCIUM PO Take 1 tablet by mouth daily    Historical Provider, MD   carvedilol (COREG) 12.5 mg tablet TAKE 2 TABLETS BY MOUTH 2 TIMES A DAY WITH MEALS.  8/15/23   Historical Provider, MD   Cholecalciferol (VITAMIN D3) 5000 units CAPS Take 1 capsule (5,000 Units total) by mouth daily  Patient not taking: Reported on 5/12/2021 10/9/18   Mela Farnsworth PA-C   clopidogrel (PLAVIX) 75 mg tablet Take 75 mg by mouth daily    Historical Provider, MD   Cyanocobalamin (VITAMIN B-12 PO) Take 1 tablet by mouth daily    Historical Provider, MD   ergocalciferol (VITAMIN D2) 50,000 units Take 50,000 Units by mouth once a week 1/25/19   Historical Provider, MD   fludrocortisone (FLORINEF) 0.1 mg tablet Take 1 tablet (0.1 mg total) by mouth daily 8/13/19   Alvina Ribeiro MD   folic acid (FOLVITE) 1 mg tablet Take 1 mg by mouth daily  1/25/19   Historical Provider, MD   gabapentin (NEURONTIN) 300 mg capsule TAKE 1 CAPSULE (300 MG TOTAL) BY MOUTH AS NEEDED (MIGRAINE) 11/8/19   Mela Farnsworth PA-C   gabapentin (NEURONTIN) 600 MG tablet Take 600 mg by mouth 3 (three) times a day 1/9/20   Historical Provider, MD   hydrocortisone 1 % lotion APPLY TOPICALLY 2 (TWO) TIMES A DAY INDICATIONS  USING ON FEET. 7/1/20   Historical Provider, MD   hydrOXYzine HCL (ATARAX) 25 mg tablet Take 75 mg by mouth 2 (two) times a day as needed for anxiety     Historical Provider, MD   memantine (NAMENDA) 10 mg tablet TAKE 1 TABLET (10 MG TOTAL) BY MOUTH 2 (TWO) TIMES A DAY. 8/7/20   Historical Provider, MD   methocarbamol (ROBAXIN) 750 mg tablet Take 1 tablet (750 mg total) by mouth every 6 (six) hours as needed for muscle spasms 3/16/21   Avani Crain MD   Morphine Sulfate Microinfusion (MORPHINE SULF MICROINFUSION PF IJ) Inject 14 mg as directed daily Via infusion pump    Historical Provider, MD   nitroglycerin (NITROSTAT) 0.4 mg SL tablet Place 0.4 mg under the tongue every 5 (five) minutes as needed for chest pain (pt has not  used recently).       Historical Provider, MD   nystatin (MYCOSTATIN) cream Apply 1 application topically 2 (two) times a day 1/11/20   Historical Provider, MD   omeprazole (PriLOSEC) 20 mg delayed release capsule omeprazole 20 mg capsule,delayed release    Historical Provider, MD   omeprazole (PriLOSEC) 40 MG capsule Take 40 mg by mouth daily    Historical Provider, MD   ondansetron (ZOFRAN) 4 mg tablet Take 1 tablet (4 mg total) by mouth every 8 (eight) hours as needed for nausea or vomiting 8/11/21   Pat Clark PA-C   oxyCODONE-acetaminophen (PERCOCET) 5-325 mg per tablet Take 1 tablet by mouth every 4 (four) hours as needed for severe painMax Daily Amount: 6 tablets  Patient not taking: Reported on 5/12/2021 3/16/21   Avani Crain MD   Pediatric Multivit-Minerals-C (Polyvitamin/Iron) CHEW Chew 1 tablet daily 8/9/20   Historical Provider, MD   POTASSIUM PO Take 40 mEq by mouth 2 (two) times a day    Historical Provider, MD   prochlorperazine (COMPAZINE) 10 mg tablet Take 1 tablet (10 mg total) by mouth every 6 (six) hours as needed for nausea or vomiting (migraine) 10/18/19   Nemo Ortiz PA-C   sertraline (ZOLOFT) 100 mg tablet Take 1.5 tablets (150 mg total) by mouth daily  Patient not taking: Reported on 8/11/2021 1/31/20   Charlotte Goff MD   sucralfate (CARAFATE) 1 g/10 mL suspension Take 10 mL (1 g total) by mouth 4 (four) times a day 9/8/21   Abel Hilliard MD   tamsulosin (FLOMAX) 0.4 mg Take 0.4 mg by mouth daily with dinner. Historical Provider, MD   traZODone (DESYREL) 100 mg tablet Take 2 tablets (200 mg total) by mouth daily at bedtime as needed for sleep  Patient not taking: Reported on 8/11/2021 1/31/20   Charlotte Goff MD   [employer]ARE SHORT PEN NEEDLES 31G X 8 MM MISC 4 INJECTIONS PER DAY DX  E11.8 8/28/18   Historical Provider, MD [place] have reviewed home medications with a medical source (PCP, Pharmacy, other). Allergies: No Known Allergies    Social History:  Marital Status: /Civil Union   Patient Pre-hospital Living Situation: Home  Patient Pre-hospital Level of Mobility: walks  Patient Pre-hospital Diet Restrictions: none  Substance Use History:   Social History     Substance and Sexual Activity   Alcohol Use Not Currently     Social History     Tobacco Use   Smoking Status Never   Smokeless Tobacco Never     Social History     Substance and Sexual Activity   Drug Use Not Currently   • Types: Marijuana       Family History:  Family History   Problem Relation Age of Onset   • Diabetes unspecified Mother    • Diabetes unspecified Brother    • Diabetes unspecified Paternal Uncle    • Diabetes unspecified Maternal Grandmother    • Diabetes unspecified Paternal Grandmother    • Diabetes unspecified Brother    • Heart attack Father        Physical Exam:     Vitals:   Blood Pressure: 168/97 (08/18/23 1722)  Pulse: 70 (08/18/23 1722)  Temperature: 97.8 °F (36.6 °C) (08/18/23 1430)  Temp Source: Oral (08/18/23 1430)  Respirations: 18 (08/18/23 1430)  SpO2: 95 % (08/18/23 1722)    Physical Exam  Vitals reviewed.    HENT:      Head: Normocephalic and atraumatic. Mouth/Throat:      Mouth: Mucous membranes are moist.   Eyes:      Conjunctiva/sclera: Conjunctivae normal.   Cardiovascular:      Rate and Rhythm: Normal rate and regular rhythm. Heart sounds: Normal heart sounds. Pulmonary:      Effort: Pulmonary effort is normal. No respiratory distress. Breath sounds: Normal breath sounds. No wheezing. Abdominal:      General: There is no distension. Palpations: Abdomen is soft. Tenderness: There is no abdominal tenderness. Musculoskeletal:      Right lower leg: No edema. Left lower leg: No edema. Skin:     General: Skin is warm. Neurological:      Mental Status: He is alert and oriented to person, place, and time. Additional Data:     Lab Results:  Results from last 7 days   Lab Units 08/18/23  1605 08/18/23  1006 08/18/23  0818   WBC Thousand/uL  --  14.42* 13.75*   HEMOGLOBIN g/dL  --  14.9 15.1   HEMATOCRIT %  --  44.6 46.1   PLATELETS Thousands/uL 266 242 270   NEUTROS PCT %  --   --  65   LYMPHS PCT %  --   --  24   MONOS PCT %  --   --  7   EOS PCT %  --   --  3     Results from last 7 days   Lab Units 08/18/23  0818   SODIUM mmol/L 138   POTASSIUM mmol/L 3.6   CHLORIDE mmol/L 106   CO2 mmol/L 25   BUN mg/dL 9   CREATININE mg/dL 0.99   ANION GAP mmol/L 7   CALCIUM mg/dL 9.3   GLUCOSE RANDOM mg/dL 128                       Lines/Drains:  Invasive Devices     Peripheral Intravenous Line  Duration           Peripheral IV 08/18/23 Right Antecubital <1 day                    Imaging: No pertinent imaging reviewed. No orders to display       EKG and Other Studies Reviewed on Admission:   · EKG: concern for anterior isschemia. ** Please Note: This note has been constructed using a voice recognition system.  **

## 2023-08-19 ENCOUNTER — APPOINTMENT (INPATIENT)
Dept: NON INVASIVE DIAGNOSTICS | Facility: HOSPITAL | Age: 63
DRG: 281 | End: 2023-08-19
Payer: MEDICARE

## 2023-08-19 PROBLEM — Z90.3 S/P GASTRIC SLEEVE PROCEDURE: Status: ACTIVE | Noted: 2023-08-19

## 2023-08-19 LAB
ALBUMIN SERPL BCP-MCNC: 3.1 G/DL (ref 3.5–5)
ALP SERPL-CCNC: 79 U/L (ref 46–116)
ALT SERPL W P-5'-P-CCNC: 35 U/L (ref 12–78)
ANION GAP SERPL CALCULATED.3IONS-SCNC: 9 MMOL/L
AORTIC ROOT: 3.3 CM
APICAL FOUR CHAMBER EJECTION FRACTION: 60 %
APTT PPP: 32 SECONDS (ref 23–37)
APTT PPP: 35 SECONDS (ref 23–37)
APTT PPP: 44 SECONDS (ref 23–37)
AST SERPL W P-5'-P-CCNC: 57 U/L (ref 5–45)
ATRIAL RATE: 65 BPM
ATRIAL RATE: 80 BPM
AV LVOT MEAN GRADIENT: 5 MMHG
AV LVOT PEAK GRADIENT: 7 MMHG
BILIRUB SERPL-MCNC: 0.69 MG/DL (ref 0.2–1)
BUN SERPL-MCNC: 10 MG/DL (ref 5–25)
CALCIUM ALBUM COR SERPL-MCNC: 9.6 MG/DL (ref 8.3–10.1)
CALCIUM SERPL-MCNC: 8.9 MG/DL (ref 8.3–10.1)
CARDIAC TROPONIN I PNL SERPL HS: 8176 NG/L (ref 8–18)
CHLORIDE SERPL-SCNC: 111 MMOL/L (ref 96–108)
CHOLEST SERPL-MCNC: 137 MG/DL
CO2 SERPL-SCNC: 21 MMOL/L (ref 21–32)
CREAT SERPL-MCNC: 1.18 MG/DL (ref 0.6–1.3)
DOP CALC LVOT AREA: 3.8 CM2
DOP CALC LVOT CARDIAC INDEX: 3.11 L/MIN/M2
DOP CALC LVOT CARDIAC OUTPUT: 7.93 L/MIN
DOP CALC LVOT DIAMETER: 2.2 CM
DOP CALC LVOT PEAK VEL VTI: 20.45 CM
DOP CALC LVOT PEAK VEL: 1.29 M/S
DOP CALC LVOT STROKE INDEX: 31.4 ML/M2
DOP CALC LVOT STROKE VOLUME: 77.7
E WAVE DECELERATION TIME: 203 MS
ERYTHROCYTE [DISTWIDTH] IN BLOOD BY AUTOMATED COUNT: 13 % (ref 11.6–15.1)
FRACTIONAL SHORTENING: 30 (ref 28–44)
GFR SERPL CREATININE-BSD FRML MDRD: 66 ML/MIN/1.73SQ M
GLUCOSE SERPL-MCNC: 126 MG/DL (ref 65–140)
HCT VFR BLD AUTO: 46 % (ref 36.5–49.3)
HDLC SERPL-MCNC: 30 MG/DL
HGB BLD-MCNC: 15.2 G/DL (ref 12–17)
INTERVENTRICULAR SEPTUM IN DIASTOLE (PARASTERNAL SHORT AXIS VIEW): 1.3 CM
INTERVENTRICULAR SEPTUM: 1.3 CM (ref 0.6–1.1)
LAAS-AP4: 18.5 CM2
LDLC SERPL CALC-MCNC: 81 MG/DL (ref 0–100)
LEFT ATRIUM AREA SYSTOLE SINGLE PLANE A4C: 17.3 CM2
LEFT ATRIUM SIZE: 3.2 CM
LEFT INTERNAL DIMENSION IN SYSTOLE: 3.1 CM (ref 2.1–4)
LEFT VENTRICULAR INTERNAL DIMENSION IN DIASTOLE: 4.4 CM (ref 3.5–6)
LEFT VENTRICULAR POSTERIOR WALL IN END DIASTOLE: 1.4 CM
LEFT VENTRICULAR STROKE VOLUME: 49 ML
LVSV (TEICH): 49 ML
MAGNESIUM SERPL-MCNC: 2.5 MG/DL (ref 1.6–2.6)
MCH RBC QN AUTO: 30.1 PG (ref 26.8–34.3)
MCHC RBC AUTO-ENTMCNC: 33 G/DL (ref 31.4–37.4)
MCV RBC AUTO: 91 FL (ref 82–98)
MV PEAK A VEL: 0.81 M/S
MV PEAK E VEL: 55 CM/S
MV STENOSIS PRESSURE HALF TIME: 59 MS
MV VALVE AREA P 1/2 METHOD: 3.73
P AXIS: -27 DEGREES
PLATELET # BLD AUTO: 271 THOUSANDS/UL (ref 149–390)
PMV BLD AUTO: 10.2 FL (ref 8.9–12.7)
POTASSIUM SERPL-SCNC: 3.6 MMOL/L (ref 3.5–5.3)
PR INTERVAL: 152 MS
PR INTERVAL: 164 MS
PROT SERPL-MCNC: 7 G/DL (ref 6.4–8.4)
QRS AXIS: -64 DEGREES
QRS AXIS: -73 DEGREES
QRSD INTERVAL: 132 MS
QRSD INTERVAL: 134 MS
QT INTERVAL: 422 MS
QT INTERVAL: 468 MS
QTC INTERVAL: 486 MS
QTC INTERVAL: 486 MS
RBC # BLD AUTO: 5.05 MILLION/UL (ref 3.88–5.62)
RIGHT ATRIUM AREA SYSTOLE A4C: 17.7 CM2
RIGHT VENTRICLE ID DIMENSION: 4.3 CM
SL CV LV EF: 65
SL CV PED ECHO LEFT VENTRICLE DIASTOLIC VOLUME (MOD BIPLANE) 2D: 86 ML
SL CV PED ECHO LEFT VENTRICLE SYSTOLIC VOLUME (MOD BIPLANE) 2D: 37 ML
SODIUM SERPL-SCNC: 141 MMOL/L (ref 135–147)
T WAVE AXIS: -30 DEGREES
T WAVE AXIS: 12 DEGREES
TRICUSPID ANNULAR PLANE SYSTOLIC EXCURSION: 1.5 CM
TRIGL SERPL-MCNC: 130 MG/DL
VENTRICULAR RATE: 65 BPM
VENTRICULAR RATE: 80 BPM
WBC # BLD AUTO: 14.89 THOUSAND/UL (ref 4.31–10.16)

## 2023-08-19 PROCEDURE — 93010 ELECTROCARDIOGRAM REPORT: CPT | Performed by: INTERNAL MEDICINE

## 2023-08-19 PROCEDURE — 85730 THROMBOPLASTIN TIME PARTIAL: CPT | Performed by: INTERNAL MEDICINE

## 2023-08-19 PROCEDURE — 99232 SBSQ HOSP IP/OBS MODERATE 35: CPT | Performed by: INTERNAL MEDICINE

## 2023-08-19 PROCEDURE — 85730 THROMBOPLASTIN TIME PARTIAL: CPT | Performed by: HOSPITALIST

## 2023-08-19 PROCEDURE — 84484 ASSAY OF TROPONIN QUANT: CPT | Performed by: PHYSICIAN ASSISTANT

## 2023-08-19 PROCEDURE — 80061 LIPID PANEL: CPT | Performed by: HOSPITALIST

## 2023-08-19 PROCEDURE — 93306 TTE W/DOPPLER COMPLETE: CPT | Performed by: INTERNAL MEDICINE

## 2023-08-19 PROCEDURE — 93325 DOPPLER ECHO COLOR FLOW MAPG: CPT | Performed by: INTERNAL MEDICINE

## 2023-08-19 PROCEDURE — 80053 COMPREHEN METABOLIC PANEL: CPT | Performed by: HOSPITALIST

## 2023-08-19 PROCEDURE — 93306 TTE W/DOPPLER COMPLETE: CPT

## 2023-08-19 PROCEDURE — 83735 ASSAY OF MAGNESIUM: CPT | Performed by: HOSPITALIST

## 2023-08-19 PROCEDURE — 85027 COMPLETE CBC AUTOMATED: CPT | Performed by: HOSPITALIST

## 2023-08-19 PROCEDURE — 93308 TTE F-UP OR LMTD: CPT | Performed by: INTERNAL MEDICINE

## 2023-08-19 PROCEDURE — 93321 DOPPLER ECHO F-UP/LMTD STD: CPT | Performed by: INTERNAL MEDICINE

## 2023-08-19 RX ORDER — RANOLAZINE 500 MG/1
500 TABLET, EXTENDED RELEASE ORAL EVERY 12 HOURS SCHEDULED
Status: DISCONTINUED | OUTPATIENT
Start: 2023-08-19 | End: 2023-08-20

## 2023-08-19 RX ORDER — LIDOCAINE 50 MG/G
2 PATCH TOPICAL DAILY
Status: DISCONTINUED | OUTPATIENT
Start: 2023-08-19 | End: 2023-08-21 | Stop reason: HOSPADM

## 2023-08-19 RX ORDER — MUSCLE RUB CREAM 100; 150 MG/G; MG/G
CREAM TOPICAL 4 TIMES DAILY PRN
Status: DISCONTINUED | OUTPATIENT
Start: 2023-08-19 | End: 2023-08-21 | Stop reason: HOSPADM

## 2023-08-19 RX ADMIN — HEPARIN SODIUM 4000 UNITS: 1000 INJECTION INTRAVENOUS; SUBCUTANEOUS at 08:13

## 2023-08-19 RX ADMIN — GABAPENTIN 600 MG: 300 CAPSULE ORAL at 15:28

## 2023-08-19 RX ADMIN — RANOLAZINE 500 MG: 500 TABLET, FILM COATED, EXTENDED RELEASE ORAL at 20:20

## 2023-08-19 RX ADMIN — BACLOFEN 10 MG: 10 TABLET ORAL at 15:28

## 2023-08-19 RX ADMIN — PANTOPRAZOLE SODIUM 40 MG: 40 TABLET, DELAYED RELEASE ORAL at 05:30

## 2023-08-19 RX ADMIN — POLYETHYLENE GLYCOL 3350 17 G: 17 POWDER, FOR SOLUTION ORAL at 08:20

## 2023-08-19 RX ADMIN — TAMSULOSIN HYDROCHLORIDE 0.4 MG: 0.4 CAPSULE ORAL at 17:20

## 2023-08-19 RX ADMIN — CARVEDILOL 12.5 MG: 12.5 TABLET, FILM COATED ORAL at 17:27

## 2023-08-19 RX ADMIN — SENNOSIDES AND DOCUSATE SODIUM 1 TABLET: 50; 8.6 TABLET ORAL at 08:18

## 2023-08-19 RX ADMIN — LIDOCAINE 5% 2 PATCH: 700 PATCH TOPICAL at 15:27

## 2023-08-19 RX ADMIN — CYANOCOBALAMIN TAB 500 MCG 1000 MCG: 500 TAB at 08:18

## 2023-08-19 RX ADMIN — GABAPENTIN 600 MG: 300 CAPSULE ORAL at 20:20

## 2023-08-19 RX ADMIN — MORPHINE SULFATE 4 MG: 4 INJECTION INTRAVENOUS at 10:55

## 2023-08-19 RX ADMIN — HEPARIN SODIUM 19.1 UNITS/KG/HR: 10000 INJECTION, SOLUTION INTRAVENOUS at 17:27

## 2023-08-19 RX ADMIN — OXYCODONE HYDROCHLORIDE 5 MG: 5 TABLET ORAL at 14:05

## 2023-08-19 RX ADMIN — NITROGLYCERIN 60 MCG/MIN: 20 INJECTION INTRAVENOUS at 21:31

## 2023-08-19 RX ADMIN — MORPHINE SULFATE 4 MG: 4 INJECTION INTRAVENOUS at 17:21

## 2023-08-19 RX ADMIN — SENNOSIDES AND DOCUSATE SODIUM 1 TABLET: 50; 8.6 TABLET ORAL at 17:21

## 2023-08-19 RX ADMIN — BACLOFEN 10 MG: 10 TABLET ORAL at 20:20

## 2023-08-19 RX ADMIN — OXYCODONE HYDROCHLORIDE 5 MG: 5 TABLET ORAL at 08:33

## 2023-08-19 RX ADMIN — LISINOPRIL 10 MG: 10 TABLET ORAL at 08:18

## 2023-08-19 RX ADMIN — ASPIRIN 81 MG CHEWABLE TABLET 81 MG: 81 TABLET CHEWABLE at 08:18

## 2023-08-19 RX ADMIN — ATORVASTATIN CALCIUM 80 MG: 80 TABLET, FILM COATED ORAL at 17:20

## 2023-08-19 RX ADMIN — BACLOFEN 10 MG: 10 TABLET ORAL at 08:18

## 2023-08-19 RX ADMIN — HEPARIN SODIUM 2000 UNITS: 1000 INJECTION INTRAVENOUS; SUBCUTANEOUS at 22:55

## 2023-08-19 RX ADMIN — CLOPIDOGREL BISULFATE 75 MG: 75 TABLET, FILM COATED ORAL at 08:18

## 2023-08-19 RX ADMIN — HEPARIN SODIUM 4000 UNITS: 1000 INJECTION INTRAVENOUS; SUBCUTANEOUS at 15:32

## 2023-08-19 RX ADMIN — CARVEDILOL 12.5 MG: 12.5 TABLET, FILM COATED ORAL at 08:33

## 2023-08-19 RX ADMIN — RANOLAZINE 500 MG: 500 TABLET, FILM COATED, EXTENDED RELEASE ORAL at 14:05

## 2023-08-19 RX ADMIN — NITROGLYCERIN 55 MCG/MIN: 20 INJECTION INTRAVENOUS at 05:27

## 2023-08-19 RX ADMIN — AMLODIPINE BESYLATE 5 MG: 5 TABLET ORAL at 08:18

## 2023-08-19 RX ADMIN — FOLIC ACID 1 MG: 1 TABLET ORAL at 08:18

## 2023-08-19 RX ADMIN — ACETAMINOPHEN 975 MG: 325 TABLET, FILM COATED ORAL at 06:14

## 2023-08-19 RX ADMIN — GABAPENTIN 600 MG: 300 CAPSULE ORAL at 08:18

## 2023-08-19 NOTE — PLAN OF CARE
Problem: CARDIOVASCULAR - ADULT  Goal: Maintains optimal cardiac output and hemodynamic stability  Description: INTERVENTIONS:  - Monitor I/O, vital signs and rhythm  - Monitor for S/S and trends of decreased cardiac output  - Administer and titrate ordered vasoactive medications to optimize hemodynamic stability  - Assess quality of pulses, skin color and temperature  - Assess for signs of decreased coronary artery perfusion  - Instruct patient to report change in severity of symptoms  Outcome: Progressing  Goal: Absence of cardiac dysrhythmias or at baseline rhythm  Description: INTERVENTIONS:  - Continuous cardiac monitoring, vital signs, obtain 12 lead EKG if ordered  - Administer antiarrhythmic and heart rate control medications as ordered  - Monitor electrolytes and administer replacement therapy as ordered  Outcome: Progressing     Problem: RESPIRATORY - ADULT  Goal: Achieves optimal ventilation and oxygenation  Description: INTERVENTIONS:  - Assess for changes in respiratory status  - Assess for changes in mentation and behavior  - Position to facilitate oxygenation and minimize respiratory effort  - Oxygen administered by appropriate delivery if ordered  - Initiate smoking cessation education as indicated  - Encourage broncho-pulmonary hygiene including cough, deep breathe, Incentive Spirometry  - Assess the need for suctioning and aspirate as needed  - Assess and instruct to report SOB or any respiratory difficulty  - Respiratory Therapy support as indicated  Outcome: Progressing     Problem: PAIN - ADULT  Goal: Verbalizes/displays adequate comfort level or baseline comfort level  Description: Interventions:  - Encourage patient to monitor pain and request assistance  - Assess pain using appropriate pain scale  - Administer analgesics based on type and severity of pain and evaluate response  - Implement non-pharmacological measures as appropriate and evaluate response  - Consider cultural and social influences on pain and pain management  - Notify physician/advanced practitioner if interventions unsuccessful or patient reports new pain  Outcome: Progressing     Problem: INFECTION - ADULT  Goal: Absence or prevention of progression during hospitalization  Description: INTERVENTIONS:  - Assess and monitor for signs and symptoms of infection  - Monitor lab/diagnostic results  - Monitor all insertion sites, i.e. indwelling lines, tubes, and drains  - Monitor endotracheal if appropriate and nasal secretions for changes in amount and color  - [place] appropriate cooling/warming therapies per order  - Administer medications as ordered  - Instruct and encourage patient and family to use good hand hygiene technique  - Identify and instruct in appropriate isolation precautions for identified infection/condition  Outcome: Progressing  Goal: Absence of fever/infection during neutropenic period  Description: INTERVENTIONS:  - Monitor WBC    Outcome: Progressing     Problem: SAFETY ADULT  Goal: Patient will remain free of falls  Description: INTERVENTIONS:  - Educate patient/family on patient safety including physical limitations  - Instruct patient to call for assistance with activity   - Consult OT/PT to assist with strengthening/mobility   - Keep Call bell within reach  - Keep bed low and locked with side rails adjusted as appropriate  - Keep care items and personal belongings within reach  - Initiate and maintain comfort rounds  - Make Fall Risk Sign visible to staff  - Offer Toileting every 2 Hours, in advance of need  - Initiate/Maintain alarm  - Obtain necessary fall risk management equipment  - Apply yellow socks and bracelet for high fall risk patients  - Consider moving patient to room near nurses station  Outcome: Progressing  Goal: Maintain or return to baseline ADL function  Description: INTERVENTIONS:  -  Assess patient's ability to carry out ADLs; assess patient's baseline for ADL function and identify physical deficits which impact ability to perform ADLs (bathing, care of mouth/teeth, toileting, grooming, dressing, etc.)  - Assess/evaluate cause of self-care deficits   - Assess range of motion  - Assess patient's mobility; develop plan if impaired  - Assess patient's need for assistive devices and provide as appropriate  - Encourage maximum independence but intervene and supervise when necessary  - Involve family in performance of ADLs  - Assess for home care needs following discharge   - Consider OT consult to assist with ADL evaluation and planning for discharge  - Provide patient education as appropriate  Outcome: Progressing  Goal: Maintains/Returns to pre admission functional level  Description: INTERVENTIONS:  - Perform BMAT or MOVE assessment daily.   - Set and communicate daily mobility goal to care team and patient/family/caregiver. - Collaborate with rehabilitation services on mobility goals if consulted  - Perform Range of Motion 3 times a day. - Reposition patient every 2 hours.   - Dangle patient 3 times a day  - Stand patient 3 times a day  - Ambulate patient 3 times a day  - Out of bed to chair 3 times a day   - Out of bed for meals 3 times a day  - Out of bed for toileting  - Record patient progress and toleration of activity level   Outcome: Progressing     Problem: DISCHARGE PLANNING  Goal: Discharge to home or other facility with appropriate resources  Description: INTERVENTIONS:  - Identify barriers to discharge w/patient and caregiver  - Arrange for needed discharge resources and transportation as appropriate  - Identify discharge learning needs (meds, wound care, etc.)  - Arrange for interpretive services to assist at discharge as needed  - Refer to Case Management Department for coordinating discharge planning if the patient needs post-hospital services based on physician/advanced practitioner order or complex needs related to functional status, cognitive ability, or social support system  Outcome: Progressing     Problem: Knowledge Deficit  Goal: Patient/family/caregiver demonstrates understanding of disease process, treatment plan, medications, and discharge instructions  Description: Complete learning assessment and assess knowledge base. Interventions:  - Provide teaching at level of understanding  - Provide teaching via preferred learning methods  Outcome: Progressing     Problem: MOBILITY - ADULT  Goal: Maintain or return to baseline ADL function  Description: INTERVENTIONS:  -  Assess patient's ability to carry out ADLs; assess patient's baseline for ADL function and identify physical deficits which impact ability to perform ADLs (bathing, care of mouth/teeth, toileting, grooming, dressing, etc.)  - Assess/evaluate cause of self-care deficits   - Assess range of motion  - Assess patient's mobility; develop plan if impaired  - Assess patient's need for assistive devices and provide as appropriate  - Encourage maximum independence but intervene and supervise when necessary  - Involve family in performance of ADLs  - Assess for home care needs following discharge   - Consider OT consult to assist with ADL evaluation and planning for discharge  - Provide patient education as appropriate  Outcome: Progressing  Goal: Maintains/Returns to pre admission functional level  Description: INTERVENTIONS:  - Perform BMAT or MOVE assessment daily.   - Set and communicate daily mobility goal to care team and patient/family/caregiver. - Collaborate with rehabilitation services on mobility goals if consulted  - Perform Range of Motion  times a day. - Reposition patient every  hours.   - Dangle patient  times a day  - Stand patient  times a day  - Ambulate patient  times a day  - Out of bed to chair  times a day   - Out of bed for meals times a day  - Out of bed for toileting  - Record patient progress and toleration of activity level   Outcome: Progressing

## 2023-08-19 NOTE — ASSESSMENT & PLAN NOTE
Lab Results   Component Value Date    HGBA1C 5.3 07/14/2022       No results for input(s): "POCGLU" in the last 72 hours.     Blood Sugar Average: Last 72 hrs:  · Diabetes mellitus type 2 A1c 5.3  · Monitor Accu-Cheks

## 2023-08-19 NOTE — ASSESSMENT & PLAN NOTE
· H/o PCI x 6  · Continue aspirin Plavix beta-blocker  · Ranexa per cardiology  · Cardiology inputs noted

## 2023-08-19 NOTE — ASSESSMENT & PLAN NOTE
· Presented to [facility] with CP, concern for anterior STEMI hence sent to [facility] -> underwent urgent LHC  •  Dist LM lesion is 50% stenosed. •  Ost LAD lesion is 40% stenosed. •  Mid LAD lesion is 40% stenosed. •  Mid Cx lesion is 20% stenosed  · Ongoing chest pain  · He is presently on IV nitro gtt.   · IV heparin GTT per cardiology  · Continue aspirin statin Plavix beta-blocker  · Ranexa initiated by cardiology  · Continue opiate analgesics  · Monitor closely

## 2023-08-19 NOTE — ASSESSMENT & PLAN NOTE
Patient on fentanyl pump  Continue gabapentin 600 mg 4 times daily, baclofen 10 mg 3 times daily  Continue opioid analgesics  Outpatient follow-up with pain service

## 2023-08-19 NOTE — PROGRESS NOTES
General Cardiology Progress Note   Roger Walter 63 y.o. male MRN: [id number]  Unit/Bed#: -01 Encounter: [id number]      Assessment:  Principal Problem:    Chest pain  Active Problems:    Chronic diastolic congestive heart failure (HCC)    Coronary artery disease involving native coronary artery    CVA (cerebral vascular accident) ([address])    Obstructive sleep apnea syndrome    Alzheimer disease ([address])    Chronic pain disorder    Opioid dependence, continuous ([address])    Type 2 diabetes mellitus with diabetic neuropathy, with long-term current use of insulin (HCC)    Major depressive disorder, recurrent, moderate (HCC)      Impression:    • NSTEMI  o Peak troponin 17,000  o Ischemic lateral T wave inversions initially, have resolved  o Ongoing chest pain, but no active ischemia on most recent EKG  o No large vessel stenosis identified as a culprit for his NSTEMI  o On closer review of the catheterization, appears his large first septal might be the culprit, there is ERIKA II flow, and his echo personally reviewed today shows a basal to mid focal anteroseptal wall defect.   • Morbid obesity, despite history of gastric sleeve  • Coronary artery disease  o First MI 2010  o Drug-eluting stent x1 to the circumflex in 2015  o Drug-eluting stent to the mid RCA in 2017.  o Drug-eluting stent to the proximal LAD in 2020  o Drug-eluting stent to the proximal and mid LAD in 2022  o Previous preserved EF without regional wall motion abnormalities per echoes at [address]  o His [facility] cardiologist left the network, has been having trouble getting refills of meds  o He is willing to follow-up at [facility] with [place] audiology  • Type 2 diabetes  • Chronic pain on a fentanyl pump    Plan:    • Start Ranexa  • Continue amlodipine  • Discontinue lisinopril considering he is becoming hypotensive on a nitroglycerin drip  • Morphine/continue nitroglycerin drip titration as hemodynamics allow for further antianginal effect  • On carvedilol    Subjective:   Patient seen and examined. Ongoing chest pain. Review of Systems   Constitutional: Negative for diaphoresis, fever, malaise/fatigue and night sweats. Cardiovascular: Positive for chest pain. Negative for dyspnea on exertion, leg swelling, orthopnea, palpitations and syncope. Respiratory: Positive for shortness of breath. Negative for cough, sputum production and wheezing. Skin: Negative for itching and rash. Gastrointestinal: Negative for abdominal pain, change in bowel habit and diarrhea. Neurological: Negative for dizziness, focal weakness, light-headedness and weakness. Objective   Vitals: Blood pressure 97/63, pulse 92, temperature 98 °F (36.7 °C), resp. rate 16, SpO2 94 %. , There is no height or weight on file to calculate BMI., I/O last 3 completed shifts: In: 120 [P.O.:120]  Out: 2500 [Urine:2500]  No intake/output data recorded. Wt Readings from Last 3 Encounters:   08/18/23 (!) 139 kg (305 lb 12.5 oz)   07/09/22 116 kg (255 lb)   08/11/21 118 kg (261 lb 3.2 oz)       Intake/Output Summary (Last 24 hours) at 8/19/2023 0941  Last data filed at 8/19/2023 3723  Gross per 24 hour   Intake 120 ml   Output 2500 ml   Net -2380 ml     I/O last 3 completed shifts: In: 120 [P.O.:120]  Out: 2500 [Urine:2500]    No significant arrhythmias seen on telemetry review. Physical Exam  Vitals reviewed. Constitutional:       General: He is not in acute distress. Appearance: He is obese. He is not diaphoretic. HENT:      Head: Normocephalic and atraumatic. Neck:      Vascular: No JVD. Cardiovascular:      Rate and Rhythm: Normal rate and regular rhythm. Heart sounds: Normal heart sounds. No murmur heard. Pulmonary:      Effort: Pulmonary effort is normal. No respiratory distress. Breath sounds: Normal breath sounds. No wheezing or rales. Abdominal:      General: Bowel sounds are normal. There is no distension. Palpations: Abdomen is soft. Tenderness: There is no abdominal tenderness. Musculoskeletal:      Cervical back: Normal range of motion and neck supple. Right lower leg: No edema. Left lower leg: No edema. Skin:     General: Skin is warm and dry. Neurological:      Mental Status: He is alert and oriented to person, place, and time.          Meds/Allergies   No Known Allergies    Current Facility-Administered Medications:   •  acetaminophen (TYLENOL) tablet 975 mg, 975 mg, Oral, Q8H PRN, Rocío Urena MD, 975 mg at 08/19/23 0614  •  albuterol (PROVENTIL HFA,VENTOLIN HFA) inhaler 2 puff, 2 puff, Inhalation, Q4H PRN, Rocío Urena MD  •  albuterol inhalation solution 2.5 mg, 2.5 mg, Nebulization, Q4H PRN, Renate Johnson MD  •  amLODIPine (NORVASC) tablet 5 mg, 5 mg, Oral, Daily, Rocío Urena MD, 5 mg at 08/19/23 0818  •  aspirin chewable tablet 81 mg, 81 mg, Oral, Daily, Rocío Urena MD, 81 mg at 08/19/23 0818  •  atorvastatin (LIPITOR) tablet 80 mg, 80 mg, Oral, After Judith Alexander MD, 80 mg at 08/18/23 1701  •  baclofen tablet 10 mg, 10 mg, Oral, TID, Rocío Urena MD, 10 mg at 08/19/23 0818  •  carvedilol (COREG) tablet 12.5 mg, 12.5 mg, Oral, BID With Meals, Rocío Urena MD, 12.5 mg at 08/19/23 9098  •  clopidogrel (PLAVIX) tablet 75 mg, 75 mg, Oral, Daily, Rocío Urena MD, 75 mg at 08/19/23 0818  •  cyanocobalamin (VITAMIN B-12) tablet 1,000 mcg, 1,000 mcg, Oral, Daily, Rocío Urena MD, 1,000 mcg at 05/85/81 0255  •  folic acid (FOLVITE) tablet 1 mg, 1 mg, Oral, Daily, Rocío Urena MD, 1 mg at 08/19/23 0818  •  gabapentin (NEURONTIN) capsule 600 mg, 600 mg, Oral, TID, Rocío Urena MD, 600 mg at 08/19/23 0818  •  heparin (porcine) 25,000 units in 0.45% NaCl 250 mL infusion (premix), 3-20 Units/kg/hr (Order-Specific), Intravenous, Titrated, Meghana Matamoros MD, Last Rate: 13.6 mL/hr at 08/19/23 0814, 15.1 Units/kg/hr at 08/19/23 0814  •  heparin (porcine) injection 2,000 Units, 2,000 Units, Intravenous, Q6H PRN, Luis Quiroz MD  •  heparin (porcine) injection 4,000 Units, 4,000 Units, Intravenous, Q6H PRN, Luis Quiroz MD, 4,000 Units at 08/19/23 0813  •  lisinopril (ZESTRIL) tablet 10 mg, 10 mg, Oral, Daily, Jatin Fernandez MD, 10 mg at 08/19/23 0818  •  morphine injection 4 mg, 4 mg, Intravenous, Q4H PRN, Guanaco Taylor PA-C  •  nitroGLYcerin (TRIDIL) 50 mg in 250 mL infusion (premix), 5-200 mcg/min, Intravenous, Titrated, Guanaco Taylor PA-C, Last Rate: 18 mL/hr at 08/19/23 0624, 60 mcg/min at 08/19/23 0624  •  ondansetron (ZOFRAN) injection 4 mg, 4 mg, Intravenous, Q6H PRN, Jatin Fernandez MD, 4 mg at 08/18/23 2041  •  oxyCODONE (ROXICODONE) IR tablet 5 mg, 5 mg, Oral, Q4H PRN, Jatin Fernandez MD, 5 mg at 08/19/23 4346  •  pantoprazole (PROTONIX) EC tablet 40 mg, 40 mg, Oral, Early Morning, Renate Johnson MD, 40 mg at 08/19/23 0530  •  polyethylene glycol (MIRALAX) packet 17 g, 17 g, Oral, Daily, Jatin Fernandez MD, 17 g at 08/19/23 0820  •  senna-docusate sodium (SENOKOT S) 8.6-50 mg per tablet 1 tablet, 1 tablet, Oral, BID, Jatin Fernandez MD, 1 tablet at 08/19/23 0818  •  tamsulosin (FLOMAX) capsule 0.4 mg, 0.4 mg, Oral, Daily With Dinner, Jatin Fernandez MD, 0.4 mg at 08/18/23 1717    Laboratory Results:        CBC with diff:   Results from last 7 days   Lab Units 08/19/23  0436 08/18/23  1605 08/18/23  1006 08/18/23  0818   WBC Thousand/uL 14.89*  --  14.42* 13.75*   HEMOGLOBIN g/dL 15.2  --  14.9 15.1   HEMATOCRIT % 46.0  --  44.6 46.1   MCV fL 91  --  90 90   PLATELETS Thousands/uL 271 266 242 270   RBC Million/uL 5.05  --  4.97 5.12   MCH pg 30.1  --  30.0 29.5   MCHC g/dL 33.0  --  33.4 32.8   RDW % 13.0  --  12.3 12.3   MPV fL 10.2 10.2 10.1 10.0   NRBC AUTO /100 WBCs  --   --   --  0       CMP:  Results from last 7 days   Lab Units 08/19/23  0436 08/18/23  0818   SODIUM mmol/L 141 138   POTASSIUM mmol/L 3.6 3.6   CHLORIDE mmol/L 111* 106   CO2 mmol/L 21 25   BUN mg/dL 10 9   CREATININE mg/dL 1.18 0.99   CALCIUM mg/dL 8.9 9.3   AST U/L 57*  --    ALT U/L 35  --    ALK PHOS U/L 79  --    EGFR ml/min/1.73sq m 66 82       BMP:  Results from last 7 days   Lab Units 08/19/23  0436 08/18/23  0818   SODIUM mmol/L 141 138   POTASSIUM mmol/L 3.6 3.6   CHLORIDE mmol/L 111* 106   CO2 mmol/L 21 25   BUN mg/dL 10 9   CREATININE mg/dL 1.18 0.99   CALCIUM mg/dL 8.9 9.3       NT-proBNP: No results for input(s): "NTBNP" in the last 72 hours. Magnesium:   Results from last 7 days   Lab Units 08/19/23  0436   MAGNESIUM mg/dL 2.5       Coags:   Results from last 7 days   Lab Units 08/19/23  0436 08/18/23  2219   PTT seconds 32 29       TSH:    Results from last 7 days   Lab Units 08/18/23  0818   TSH 3RD GENERATON uIU/mL 2.656       Hemoglobin A1C )      Lipid Profile:   Lab Results   Component Value Date    CHOL 119 01/06/2015    CHOL 134 04/04/2014     Lab Results   Component Value Date    HDL 30 (L) 08/19/2023    HDL 28 (L) 10/07/2020    HDL 27 (L) 11/04/2017     Lab Results   Component Value Date    LDLCALC 81 08/19/2023    LDLCALC 98 10/07/2020    LDLCALC 48 11/04/2017     No results found for: "LDLDIRECT"  Lab Results   Component Value Date    TRIG 130 08/19/2023    TRIG 107 10/07/2020    TRIG 177 (H) 11/04/2017       Cardiac testing:   EKG personally reviewed by Alejandra Borrero MD.     Cath:  ECHO:  Stress TEST:  Other:        Alejandra Borrero MD    Portions of the record may have been created with voice recognition software. Occasional wrong word or "sound a like" substitutions may have occurred due to the inherent limitations of voice recognition software. Read the chart carefully and recognize, using context, where substitutions have occurred.

## 2023-08-19 NOTE — PROGRESS NOTES
[facility]  Progress Note  Name: Valentina Novoa  MRN: [id number]  Unit/Bed#: -01 I Date of Admission: 8/18/2023   Date of Service: 8/19/2023 I Hospital Day: 1    Assessment/Plan   * Chest pain  Assessment & Plan  · Presented to [facility] with CP, concern for anterior STEMI hence sent to [facility] -> underwent urgent LHC  •  Dist LM lesion is 50% stenosed. •  Ost LAD lesion is 40% stenosed. •  Mid LAD lesion is 40% stenosed. •  Mid Cx lesion is 20% stenosed  · Ongoing chest pain  · He is presently on IV nitro gtt. · IV heparin GTT per cardiology  · Continue aspirin statin Plavix beta-blocker  · Ranexa initiated by cardiology  · Continue opiate analgesics  · Monitor closely    History of gastric sleeve procedure  Assessment & Plan  History of gastric sleeve procedure noted    Major depressive disorder, recurrent, moderate (HCC)  Assessment & Plan  · Outpatient follow-up    Type 2 diabetes mellitus with diabetic neuropathy, with long-term current use of insulin ([facility])  Assessment & Plan  Lab Results   Component Value Date    HGBA1C 5.3 07/14/2022       No results for input(s): "POCGLU" in the last 72 hours.     Blood Sugar Average: Last 72 hrs:  · Diabetes mellitus type 2 A1c 5.3  · Monitor Accu-Cheks    Chronic pain disorder  Assessment & Plan  Patient on fentanyl pump  Continue gabapentin 600 mg 4 times daily, baclofen 10 mg 3 times daily  Continue opioid analgesics  Outpatient follow-up with pain service      Obstructive sleep apnea syndrome  Assessment & Plan  CPAP compliance encouraged    Coronary artery disease involving native coronary artery  Assessment & Plan  · H/o PCI x 6  · Continue aspirin Plavix beta-blocker  · Ranexa per cardiology  · Cardiology inputs noted      Chronic diastolic congestive heart failure [facility])  Assessment & Plan  Wt Readings from Last 3 Encounters:   08/19/23 (!) 138 kg (305 lb)   08/18/23 (!) 139 kg (305 lb 12.5 oz)   07/09/22 116 kg (255 lb)     Continue carvedilol  Monitor I/O, daily weights  Cardiology following                     VTE Pharmacologic Prophylaxis: VTE Score: 5 High Risk (Score >/= 5) - Pharmacological DVT Prophylaxis Ordered: heparin drip. Sequential Compression Devices Ordered. Patient Centered Rounds: I performed bedside rounds with nursing staff today. Discussions with Specialists or Other Care Team Provider:     Education and Discussions with Family / Patient: Cussed with the patient, spouse at bedside updated questions answered. Total Time Spent on Date of Encounter in care of patient: 35 minutes This time was spent on one or more of the following: performing physical exam; counseling and coordination of care; obtaining or reviewing history; documenting in the medical record; reviewing/ordering tests, medications or procedures; communicating with other healthcare professionals and discussing with patient's family/caregivers. Current Length of Stay: 1 day(s)  Current Patient Status: Inpatient   Certification Statement: The patient will continue to require additional inpatient hospital stay due to As outlined  Discharge Plan: Awaiting clinical and symptomatic improvement, presently on IV heparin, IV nitroglycerin gtt. Code Status: Level 1 - Full Code    Subjective:     Reports severe chest pain, pain is described as "elephant sitting on the chest"  Patient describes pain is 15/10 intensity  Reports feeling short of breath  He seems comfortable in bed however.   His spouse at bedside is tearful because patient complains of such severe chest pains  History chart labs medications reviewed  Patient and his spouse were reassured  Increase incentive spirometry as able      Objective:     Vitals:   Temp (24hrs), Av.1 °F (36.7 °C), Min:97.8 °F (36.6 °C), Max:98.3 °F (36.8 °C)    Temp:  [97.8 °F (36.6 °C)-98.3 °F (36.8 °C)] 97.8 °F (36.6 °C)  HR:  [65-93] 86  Resp:  [16-18] 18  BP: ()/() 117/89  SpO2:  [92 %-98 %] 97 %  Body mass index is 41.37 kg/m². Input and Output Summary (last 24 hours): Intake/Output Summary (Last 24 hours) at 8/19/2023 1524  Last data filed at 8/19/2023 1101  Gross per 24 hour   Intake --   Output 2000 ml   Net -2000 ml       Physical Exam:   Physical Exam       Comfortably in bed  Obese  Short thick neck mass   Lungs diminished breath sounds bilateral  Heart sounds S1-S2 noted  Heart sounds are distant  Abdomen soft  Abdominal obesity noted  Awake and alert obey simple commands  No rash    Additional Data:     Labs:  Results from last 7 days   Lab Units 08/19/23  0436 08/18/23  1006 08/18/23  0818   WBC Thousand/uL 14.89*   < > 13.75*   HEMOGLOBIN g/dL 15.2   < > 15.1   HEMATOCRIT % 46.0   < > 46.1   PLATELETS Thousands/uL 271   < > 270   NEUTROS PCT %  --   --  65   LYMPHS PCT %  --   --  24   MONOS PCT %  --   --  7   EOS PCT %  --   --  3    < > = values in this interval not displayed.      Results from last 7 days   Lab Units 08/19/23  0436   SODIUM mmol/L 141   POTASSIUM mmol/L 3.6   CHLORIDE mmol/L 111*   CO2 mmol/L 21   BUN mg/dL 10   CREATININE mg/dL 1.18   ANION GAP mmol/L 9   CALCIUM mg/dL 8.9   ALBUMIN g/dL 3.1*   TOTAL BILIRUBIN mg/dL 0.69   ALK PHOS U/L 79   ALT U/L 35   AST U/L 57*   GLUCOSE RANDOM mg/dL 126                       Lines/Drains:  Invasive Devices     Peripheral Intravenous Line  Duration           Peripheral IV 08/18/23 Right Antecubital 1 day    Peripheral IV 08/18/23 Right;Ventral (anterior) Forearm <1 day                  Telemetry:  Telemetry Orders (From admission, onward)             24 Hour Telemetry Monitoring  Continuous x 24 Hours (Telem)        Question:  Reason for 24 Hour Telemetry  Answer:  PCI/EP study (including pacer and ICD implementation), Cardiac surgery, MI, abnormal cardiac cath, and chest pain- rule out MI                 Telemetry Reviewed: Sinus rhythm  Indication for Continued Telemetry Use: Acute MI/Unstable Angina/Rule out ACS             Imaging: Reviewed radiology reports from this admission including: chest xray, chest CT scan, procedure reports and ECHO    Recent Cultures (last 7 days):         Last 24 Hours Medication List:   Current Facility-Administered Medications   Medication Dose Route Frequency Provider Last Rate   • acetaminophen  975 mg Oral Q8H PRN Aubree Seth MD     • albuterol  2 puff Inhalation Q4H PRN Aubree Seth MD     • albuterol  2.5 mg Nebulization Q4H PRN Aubree Seth MD     • amLODIPine  5 mg Oral Daily Aubree Seth MD     • aspirin  81 mg Oral Daily Aubree Seth MD     • atorvastatin  80 mg Oral After Delbert Cortes MD     • baclofen  10 mg Oral TID Aubree Seth MD     • carvedilol  12.5 mg Oral BID With Meals Aubree Seth MD     • clopidogrel  75 mg Oral Daily Aubree Seth MD     • vitamin B-12  1,000 mcg Oral Daily Aubree Seth MD     • folic acid  1 mg Oral Daily Aubree Seth MD     • gabapentin  600 mg Oral TID Aubree Seth MD     • heparin (porcine)  3-20 Units/kg/hr (Order-Specific) Intravenous Titrated Meryle Moose, MD 15.1 Units/kg/hr (08/19/23 0916)   • heparin (porcine)  2,000 Units Intravenous Q6H PRN Meryle Moose, MD     • heparin (porcine)  4,000 Units Intravenous Q6H PRN Meryle Moose, MD     • lidocaine  2 patch Topical Daily Ke Light MD     • menthol-methyl salicylate   Apply externally 4x Daily PRN Ke Light MD     • morphine injection  4 mg Intravenous Q4H PRN Lore Holt PA-C     • nitroGLYcerin  5-200 mcg/min Intravenous Titrated Lore Holt PA-C 60 mcg/min (08/19/23 0176)   • ondansetron  4 mg Intravenous Q6H PRN Aubree Seth MD     • oxyCODONE  5 mg Oral Q4H PRN Aubree Seth MD     • pantoprazole  40 mg Oral Early Morning Aubree Seth MD     • polyethylene glycol  17 g Oral Daily Renate Johnson MD     • ranolazine  500 mg Oral Q12H Betsey Hernandez MD     • senna-docusate sodium  1 tablet Oral BID Alyse Bob MD     • tamsulosin  0.4 mg Oral Daily With Ladarius Garcia MD          Today, Patient Was Seen By: Evans Strange MD    **Please Note: This note may have been constructed using a voice recognition system. **

## 2023-08-19 NOTE — ED PROVIDER NOTES
History  Chief Complaint   Patient presents with   • Chest Pain     Presents to ED with c/o chest pain with SOB and arm pain started one hour ago. Woke from sleep. Used NITRO x 6 with no relief per patient. States that this is heart attack number five right now". Patient anxious. Demanding nitro states that his were no good. 53-year-old male with a past medical history of ACS with reported 6 prior stents presents for evaluation of sudden onset right-sided chest pain that radiated down his right arm that woke him from sleep approximately 1 hour prior to arrival.  Patient describes squeezing pressure-like sensation that has been constant. Patient also reports he is not compliant with his aspirin and Plavix. Denies associated shortness of breath. Prior to Admission Medications   Prescriptions Last Dose Informant Patient Reported? Taking? ARIPiprazole (ABILIFY) 5 mg tablet   Yes No   Sig: Take 10 mg by mouth daily    CALCIUM PO  Self Yes No   Sig: Take 1 tablet by mouth daily   Cholecalciferol (VITAMIN D3) 5000 units CAPS  Self No No   Sig: Take 1 capsule (5,000 Units total) by mouth daily   Patient not taking: Reported on 2021   Cyanocobalamin (VITAMIN B-12 PO)  Self Yes No   Sig: Take 1 tablet by mouth daily   Morphine Sulfate Microinfusion (MORPHINE SULF MICROINFUSION PF IJ)  Self Yes No   Sig: Inject 14 mg as directed daily Via infusion pump   POTASSIUM PO  Self Yes No   Sig: Take 40 mEq by mouth 2 (two) times a day   Pediatric Multivit-Minerals-C (Polyvitamin/Iron) CHEW   Yes No   Sig: Chew 1 tablet daily   ULTICARE SHORT PEN NEEDLES 31G X 8 MM MISC  Self Yes No   Si INJECTIONS PER DAY DX  E11.8   albuterol (ACCUNEB) 1.25 MG/3ML nebulizer solution  Self Yes No   Sig: Take 1 ampule by nebulization every 6 (six) hours as needed for wheezing   atorvastatin (LIPITOR) 40 mg tablet  Self Yes No   Sig: Take 40 mg by mouth daily.    baclofen 10 mg tablet  Self Yes No   Sig: Take 10 mg by mouth 3 (three) times a day   carvedilol (COREG) 12.5 mg tablet   Yes Yes   Sig: TAKE 2 TABLETS BY MOUTH 2 TIMES A DAY WITH MEALS. clopidogrel (PLAVIX) 75 mg tablet   Yes No   Sig: Take 75 mg by mouth daily   ergocalciferol (VITAMIN D2) 50,000 units  Self Yes No   Sig: Take 50,000 Units by mouth once a week   fludrocortisone (FLORINEF) 0.1 mg tablet  Self No No   Sig: Take 1 tablet (0.1 mg total) by mouth daily   folic acid (FOLVITE) 1 mg tablet  Self Yes No   Sig: Take 1 mg by mouth daily    gabapentin (NEURONTIN) 300 mg capsule  Self No No   Sig: TAKE 1 CAPSULE (300 MG TOTAL) BY MOUTH AS NEEDED (MIGRAINE)   gabapentin (NEURONTIN) 600 MG tablet  Self Yes No   Sig: Take 600 mg by mouth 3 (three) times a day   hydrOXYzine HCL (ATARAX) 25 mg tablet  Self Yes No   Sig: Take 75 mg by mouth 2 (two) times a day as needed for anxiety    hydrocortisone 1 % lotion   Yes No   Sig: APPLY TOPICALLY 2 (TWO) TIMES A DAY INDICATIONS  USING ON FEET. memantine (NAMENDA) 10 mg tablet   Yes No   Sig: TAKE 1 TABLET (10 MG TOTAL) BY MOUTH 2 (TWO) TIMES A DAY. methocarbamol (ROBAXIN) 750 mg tablet   No No   Sig: Take 1 tablet (750 mg total) by mouth every 6 (six) hours as needed for muscle spasms   nitroglycerin (NITROSTAT) 0.4 mg SL tablet  Self Yes No   Sig: Place 0.4 mg under the tongue every 5 (five) minutes as needed for chest pain (pt has not  used recently).      nystatin (MYCOSTATIN) cream  Self Yes No   Sig: Apply 1 application topically 2 (two) times a day   omeprazole (PriLOSEC) 20 mg delayed release capsule   Yes No   Sig: omeprazole 20 mg capsule,delayed release   omeprazole (PriLOSEC) 40 MG capsule  Self Yes No   Sig: Take 40 mg by mouth daily   ondansetron (ZOFRAN) 4 mg tablet   No No   Sig: Take 1 tablet (4 mg total) by mouth every 8 (eight) hours as needed for nausea or vomiting   oxyCODONE-acetaminophen (PERCOCET) 5-325 mg per tablet   No No   Sig: Take 1 tablet by mouth every 4 (four) hours as needed for severe painMax Daily Amount: 6 tablets   Patient not taking: Reported on 5/12/2021   prochlorperazine (COMPAZINE) 10 mg tablet  Self No No   Sig: Take 1 tablet (10 mg total) by mouth every 6 (six) hours as needed for nausea or vomiting (migraine)   sertraline (ZOLOFT) 100 mg tablet  Self No No   Sig: Take 1.5 tablets (150 mg total) by mouth daily   Patient not taking: Reported on 8/11/2021   sucralfate (CARAFATE) 1 g/10 mL suspension   No No   Sig: Take 10 mL (1 g total) by mouth 4 (four) times a day   tamsulosin (FLOMAX) 0.4 mg  Self Yes No   Sig: Take 0.4 mg by mouth daily with dinner.    traZODone (DESYREL) 100 mg tablet  Self No No   Sig: Take 2 tablets (200 mg total) by mouth daily at bedtime as needed for sleep   Patient not taking: Reported on 8/11/2021      Facility-Administered Medications: None       Past Medical History:   Diagnosis Date   • Acute on chronic diastolic congestive heart failure (HCC)    • Altered gait    • Alzheimer disease (HCC)     per patients,,early onset    • Angina pectoris (HCC)    • Anxiety    • Arthritis    • Brain aneurysm     coils placed   • Cardiac disease    • Chest pain 1/13/2016   • Chronic kidney disease    • Chronic pain     back/ right groin and rle- has morphine pump   • Constipation    • COPD (chronic obstructive pulmonary disease) (HCC)    • Coronary artery disease    • CPAP (continuous positive airway pressure) dependence    • Decubital ulcer     sacral decub-occured 5/2019-sees wound care/debide in OR today 6/6/2019   • Dependent on walker for ambulation     w/c for long distance   • Depression    • Diabetes mellitus (HCC)     insulin dependent   • Dizziness     occ   • Dysphagia    • Enlarged prostate    • Fall    • GERD (gastroesophageal reflux disease)    • Heart failure ([address])    • Hiatal hernia    • Hx of gastric bypass 11/19/2018   • Hypercholesterolemia    • Hypertension    • MI (myocardial infarction) ([address])     2017- stents x2   • Migraine    • Morbid obesity ([address]) gastric bypass sleeve 11/2018-wt loss 125 lb   • Neuropathy    • Oxygen dependent     Q HS  2LPM with CPAP and prn during day 2-3 LPM    • Pressure injury of skin    • Renal disorder    • Shortness of breath    • Skin abnormality     sacral wound - covered with pad   • Sleep apnea    • Stented coronary artery    • Stroke [facility])     vision loss b/l  2005, residual R leg weakness   • Urinary frequency    • Use of cane as ambulatory aid    • Wears dentures    • Wears glasses    • Wears glasses    • Wheelchair dependent        Past Surgical History:   Procedure Laterality Date   • BACK SURGERY     • BRAIN SURGERY     • CARDIAC CATHETERIZATION      with stents   • CEREBRAL ANEURYSM REPAIR      with coils   • COLONOSCOPY     • ESOPHAGOGASTRODUODENOSCOPY N/A 7/1/2016    Procedure: ESOPHAGOGASTRODUODENOSCOPY (EGD); Surgeon: Karishma Borja MD;  Location: [facility]; Service:    • GASTRIC BYPASS  11/19/2018   • HERNIA REPAIR     • HIATAL HERNIA REPAIR     • INFUSION PUMP IMPLANTATION Left     morphine   • INTRATHECAL PUMP IMPLANTATION Left 7/9/2020    Procedure: REVISION INTRATHECAL PAIN PUMP POCKET, LEFT ABDOMEN;  Surgeon: Sharon Morgan MD;  [facility];  Service: Neurosurgery   • KNEE ARTHROSCOPY Right    • KNEE ARTHROSCOPY Right    • PERONEAL NERVE DECOMPRESSION Right    • WI DEBRIDEMENT OPEN WOUND 20 SQ CM/< N/A 6/6/2019    Procedure: EXCISIONAL DEBRIDEMENT OF SACRAL DECUBITUS ULCER;  Surgeon: Rajiv Pedro MD;  [facility];  Service: General   • WI ESOPHAGOGASTRODUODENOSCOPY TRANSORAL DIAGNOSTIC N/A 2/27/2017    Procedure: ESOPHAGOGASTRODUODENOSCOPY (EGD); Surgeon: Karishma Borja MD;  [facility]: [facility]; Service: Gastroenterology   • WI ESOPHAGOGASTRODUODENOSCOPY TRANSORAL DIAGNOSTIC N/A 8/23/2018    Procedure: ESOPHAGOGASTRODUODENOSCOPY (EGD) with biopsy;  Surgeon: Karishma Borja MD;  [facility];   Service: Gastroenterology   • WI IMPLTJ/RPLCMT ITHCL/EDRL DRUG NFS PRGRBL PUMP Left 10/13/2020 Procedure: EXPLORATION OF INTRATHECAL PAIN PUMP SYSTEM INTEGRITY FOR POSSIBLE REPLACEMENT OF CATHETER AND PUMP.;  Surgeon: Agustin Barroso MD;  [facility];  Service: Neurosurgery   • AR IMPLTJ/RPLCMT ITHCL/EDRL DRUG NFS SUBQ RSVR N/A 1/19/2017    Procedure: REMOVAL / [address];  Surgeon: Faviola Yates MD;  [facility];  Service: Orthopedics   • AR IMPLTJ/RPLCMT ITHCL/EDRL DRUG NFS SUBQ RSVR N/A 5/16/2016    Procedure: REPLACEMENT AND PROGRAM PUMP ;  Surgeon: Faviola Yates MD;  [facility];  Service: Orthopedics   • AR PRQ IMPLTJ NSTIM ELECTRODE ARRAY EPIDURAL Right 3/17/2021    Procedure: INSERTION THORACIC DORSAL COLUMN SPINAL CORD STIMULATOR PERCUTANEOUS W IMPLANTABLE PULSE GENERATOR, RIGHT;  Surgeon: Agustin Barroso MD;  [facility];  Service: Neurosurgery       Family History   Problem Relation Age of Onset   • Diabetes unspecified Mother    • Diabetes unspecified Brother    • Diabetes unspecified Paternal Uncle    • Diabetes unspecified Maternal Grandmother    • Diabetes unspecified Paternal Grandmother    • Diabetes unspecified Brother    • Heart attack Father      I have reviewed and agree with the history as documented. E-Cigarette/Vaping   • E-Cigarette Use Never User      E-Cigarette/Vaping Substances   • Nicotine No    • THC No    • CBD No    • Flavoring No    • Other No    • Unknown No      Social History     Tobacco Use   • Smoking status: Never   • Smokeless tobacco: Never   Vaping Use   • Vaping Use: Never used   Substance Use Topics   • Alcohol use: Not Currently   • Drug use: Not Currently     Types: Marijuana       Review of Systems   Respiratory: Positive for chest tightness. Physical Exam  Physical Exam  Vitals and nursing note reviewed. Constitutional:       General: He is not in acute distress. Appearance: He is well-developed. HENT:      Head: Normocephalic and atraumatic.       Right Ear: External ear normal.      Left Ear: External ear normal.      Nose: Nose normal.   Eyes:      General: No scleral icterus. Cardiovascular:      Rate and Rhythm: Normal rate. Pulmonary:      Effort: Pulmonary effort is normal. No respiratory distress. Breath sounds: No wheezing. Abdominal:      General: There is no distension. Palpations: Abdomen is soft. Musculoskeletal:         General: No deformity. Normal range of motion. Cervical back: Normal range of motion and neck supple. Skin:     General: Skin is warm. Findings: No rash. Neurological:      General: No focal deficit present. Mental Status: He is alert.       Gait: Gait normal.   Psychiatric:         Mood and Affect: Mood normal.         Vital Signs  ED Triage Vitals   Temperature Pulse Respirations Blood Pressure SpO2   08/18/23 0814 08/18/23 0811 08/18/23 0811 08/18/23 0811 08/18/23 0811   97.6 °F (36.4 °C) (!) 39 20 (!) 187/108 99 %      Temp Source Heart Rate Source Patient Position - Orthostatic VS BP Location FiO2 (%)   08/18/23 0814 08/18/23 0811 08/18/23 0811 08/18/23 0811 --   Temporal Monitor Lying Right arm       Pain Score       08/18/23 0817       10 - Worst Possible Pain           Vitals:    08/18/23 0956 08/18/23 0959 08/18/23 1000 08/18/23 1005   BP: 166/87 (!) 184/127 (!) 194/101 (!) 190/101   Pulse: 80 78 77 75   Patient Position - Orthostatic VS:             Visual Acuity      ED Medications  Medications   aspirin chewable tablet 324 mg (324 mg Oral Given 8/18/23 0817)   fentanyl citrate (PF) 100 MCG/2ML 100 mcg (100 mcg Intravenous Given 8/18/23 0817)   nitroglycerin (NITROSTAT) SL tablet 0.4 mg (0.4 mg Sublingual Given 8/18/23 0837)   HYDROmorphone (DILAUDID) injection 1 mg (1 mg Intravenous Given 8/18/23 0901)   heparin (porcine) injection 4,000 Units (4,000 Units Intravenous Given 8/18/23 0940)   ticagrelor (BRILINTA) tablet 180 mg (180 mg Oral Given 8/18/23 0947)       Diagnostic Studies  Results Reviewed     Procedure Component Value Units Date/Time    HS Troponin I 2hr [[id number]]  (Abnormal) Collected: 08/18/23 1006    Lab Status: Final result Specimen: Blood from Arm, Right Updated: 08/18/23 1036     hs TnI 2hr 2,422 ng/L      Delta 2hr hsTnI 2,416 ng/L     CBC [[id number]]  (Abnormal) Collected: 08/18/23 1006    Lab Status: Final result Specimen: Blood from Arm, Right Updated: 08/18/23 1011     WBC 14.42 Thousand/uL      RBC 4.97 Million/uL      Hemoglobin 14.9 g/dL      Hematocrit 44.6 %      MCV 90 fL      MCH 30.0 pg      MCHC 33.4 g/dL      RDW 12.3 %      Platelets 487 Thousands/uL      MPV 10.1 fL     HS Troponin 0hr (reflex protocol) [[id number]]  (Normal) Collected: 08/18/23 0818    Lab Status: Final result Specimen: Blood from Arm, Right Updated: 08/18/23 0846     hs TnI 0hr 6 ng/L     D-dimer, quantitative [[id number]]  (Normal) Collected: 08/18/23 0818    Lab Status: Final result Specimen: Blood from Arm, Right Updated: 08/18/23 0839     D-Dimer, Quant 0.39 ug/ml FEU     Narrative: In the evaluation for possible pulmonary embolism, in the appropriate (Well's Score of 4 or less) patient, the age adjusted d-dimer cutoff for this patient can be calculated as:    Age x 0.01 (in ug/mL) for Age-adjusted D-dimer exclusion threshold for a patient over 50 years.     Basic metabolic panel [[id number]] Collected: 08/18/23 0818    Lab Status: Final result Specimen: Blood from Arm, Right Updated: 08/18/23 7612     Sodium 138 mmol/L      Potassium 3.6 mmol/L      Chloride 106 mmol/L      CO2 25 mmol/L      ANION GAP 7 mmol/L      BUN 9 mg/dL      Creatinine 0.99 mg/dL      Glucose 128 mg/dL      Calcium 9.3 mg/dL      eGFR 82 ml/min/1.73sq m     Narrative:      [facility] (CKD):   •  Stage 1 with normal or high GFR (GFR > 90 mL/min/1.73 square meters)  •  Stage 2 Mild CKD (GFR = 60-89 mL/min/1.73 square meters)  •  Stage 3A Moderate CKD (GFR = 45-59 mL/min/1.73 square meters)  •  Stage 3B Moderate CKD (GFR = 30-44 mL/min/1.73 square meters)  •  Stage 4 Severe CKD (GFR = 15-29 mL/min/1.73 square meters)  •  Stage 5 End Stage CKD (GFR <15 mL/min/1.73 square meters)  Note: GFR calculation is accurate only with a steady state creatinine    CBC and differential [[id number]]  (Abnormal) Collected: 08/18/23 0818    Lab Status: Final result Specimen: Blood from Arm, Right Updated: 08/18/23 0825     WBC 13.75 Thousand/uL      RBC 5.12 Million/uL      Hemoglobin 15.1 g/dL      Hematocrit 46.1 %      MCV 90 fL      MCH 29.5 pg      MCHC 32.8 g/dL      RDW 12.3 %      MPV 10.0 fL      Platelets 960 Thousands/uL      nRBC 0 /100 WBCs      Neutrophils Relative 65 %      Immat GRANS % 1 %      Lymphocytes Relative 24 %      Monocytes Relative 7 %      Eosinophils Relative 3 %      Basophils Relative 0 %      Neutrophils Absolute 9.01 Thousands/µL      Immature Grans Absolute 0.10 Thousand/uL      Lymphocytes Absolute 3.23 Thousands/µL      Monocytes Absolute 0.91 Thousand/µL      Eosinophils Absolute 0.45 Thousand/µL      Basophils Absolute 0.05 Thousands/µL                  X-ray chest 1 view portable   Final Result by Bubba Rausch MD (08/18 1032)      No acute cardiopulmonary disease. Resident: Sal Summers, the attending radiologist, have reviewed the images and agree with the final report above. Workstation performed: [id number]                    Procedures  CriticalCare Time    Date/Time: 8/19/2023 7:38 AM    Performed by: Angeline Sparrow DO  Authorized by:  Angeline Sparrow DO    Critical care provider statement:     Critical care time (minutes):  40    Critical care time was exclusive of:  Separately billable procedures and treating other patients and teaching time    Critical care was necessary to treat or prevent imminent or life-threatening deterioration of the following conditions:  Circulatory failure and cardiac failure    Critical care was time spent personally by me on the following activities:  Obtaining history from patient or surrogate, development of treatment plan with patient or surrogate, blood draw for specimens, discussions with consultants, discussions with primary provider, evaluation of patient's response to treatment, examination of patient, ordering and performing treatments and interventions, ordering and review of laboratory studies, interpretation of cardiac output measurements, ordering and review of radiographic studies, re-evaluation of patient's condition and review of old charts    I assumed direction of critical care for this patient from another provider in my specialty: no               ED Course                               SBIRT 20yo+    Flowsheet Row Most Recent Value   Initial Alcohol Screen: US AUDIT-C     1. How often do you have a drink containing alcohol? 0 Filed at: 08/18/2023 0814   2. How many drinks containing alcohol do you have on a typical day you are drinking? 0 Filed at: 08/18/2023 0814   3a. Male UNDER 65: How often do you have five or more drinks on one occasion? 0 Filed at: 08/18/2023 0814   3b. FEMALE Any Age, or MALE 65+: How often do you have 4 or more drinks on one occassion? 0 Filed at: 08/18/2023 0814   Audit-C Score 0 Filed at: 08/18/2023 8854   BARBARA: How many times in the past year have you. .. Used an illegal drug or used a prescription medication for non-medical reasons? Never Filed at: 08/18/2023 4670                    Medical Decision Making  71-year-old male presenting with chest pain. Differential diagnosis includes but not limited to ACS, pulmonary embolism, infectious etiology. Obtain cardiopulmonary evaluation with EKG, labs, chest x-ray. Administer aspirin and symptom control as needed. Initial EKG without signs of ischemia and initial troponin within normal limits. Patient continued to complain of pain despite narcotic pain medication.   Repeat EKG reveals ST elevations with reciprocal depressions in inferior leads. Discussed case with cardiology and interventional cardiology. Administer Brilinta and start heparin. Patient transferred to [facility] as a STEMI alert. ACS (acute coronary syndrome) [facility]): acute illness or injury  ST segment depression: acute illness or injury  STEMI (ST elevation myocardial infarction) [facility]): acute illness or injury  Amount and/or Complexity of Data Reviewed  Labs: ordered. Radiology: ordered. Risk  OTC drugs. Prescription drug management. Disposition  Final diagnoses:   ST segment depression   ACS (acute coronary syndrome) ([address])   STEMI (ST elevation myocardial infarction) ([address])     Time reflects when diagnosis was documented in both MDM as applicable and the Disposition within this note     Time User Action Codes Description Comment    8/18/2023  9:21 AM Vin Piles Add [R94.31] ST segment depression     8/18/2023  9:21 AM Vin Piles Add [I24.9] ACS (acute coronary syndrome) ([address])     8/18/2023  9:51 AM Vin Piles Add [I21.3] STEMI (ST elevation myocardial infarction) [facility])       ED Disposition     ED Disposition   Transfer to Another Facility-In Network    Condition   --    Date/Time   Fri Aug 18, 2023  9:51 AM    Comment   David Barksdalejeramie Alexis should be transferred out to [facility].            MD Po Reaves Most Recent Value   Patient Condition The patient has been stabilized such that within reasonable medical probability, no material deterioration of the patient condition or the condition of the unborn child(jc) is likely to result from the transfer   Reason for Transfer Level of Care needed not available at this facility   Benefits of Transfer Specialized equipment and/or services available at the receiving facility (Include comment)________________________   Risks of Transfer Potential for delay in receiving treatment   Accepting Physician Dr. Fan Cobian Name, [facility] Sending MD George Main   Provider Certification General risk, such as traffic hazards, adverse weather conditions, rough terrain or turbulence, possible failure of equipment (including vehicle or aircraft), or consequences of actions of persons outside the control of the transport personnel      RN Documentation    [address] Name, [address] [facility]      Follow-up Information    None         Discharge Medication List as of 8/18/2023 10:20 AM      CONTINUE these medications which have NOT CHANGED    Details   carvedilol (COREG) 12.5 mg tablet TAKE 2 TABLETS BY MOUTH 2 TIMES A DAY WITH MEALS., Historical Med      albuterol (ACCUNEB) 1.25 MG/3ML nebulizer solution Take 1 ampule by nebulization every 6 (six) hours as needed for wheezing, Historical Med      ARIPiprazole (ABILIFY) 5 mg tablet Take 10 mg by mouth daily , Starting Thu 8/6/2020, Historical Med      atorvastatin (LIPITOR) 40 mg tablet Take 40 mg by mouth daily. , Until Discontinued, Historical Med      baclofen 10 mg tablet Take 10 mg by mouth 3 (three) times a day, Historical Med      CALCIUM PO Take 1 tablet by mouth daily, Historical Med      Cholecalciferol (VITAMIN D3) 5000 units CAPS Take 1 capsule (5,000 Units total) by mouth daily, Starting Tue 10/9/2018, Normal      clopidogrel (PLAVIX) 75 mg tablet Take 75 mg by mouth daily, Historical Med      Cyanocobalamin (VITAMIN B-12 PO) Take 1 tablet by mouth daily, Historical Med      ergocalciferol (VITAMIN D2) 50,000 units Take 50,000 Units by mouth once a week, Starting Fri 1/25/2019, Historical Med      fludrocortisone (FLORINEF) 0.1 mg tablet Take 1 tablet (0.1 mg total) by mouth daily, Starting Tue 7/53/1768, Print      folic acid (FOLVITE) 1 mg tablet Take 1 mg by mouth daily , Starting Fri 1/25/2019, Historical Med      gabapentin (NEURONTIN) 300 mg capsule TAKE 1 CAPSULE (300 MG TOTAL) BY MOUTH AS NEEDED (MIGRAINE), Starting Fri 11/8/2019, Normal gabapentin (NEURONTIN) 600 MG tablet Take 600 mg by mouth 3 (three) times a day, Starting Thu 1/9/2020, Historical Med      hydrocortisone 1 % lotion APPLY TOPICALLY 2 (TWO) TIMES A DAY INDICATIONS  USING ON FEET., Historical Med      hydrOXYzine HCL (ATARAX) 25 mg tablet Take 75 mg by mouth 2 (two) times a day as needed for anxiety , Historical Med      memantine (NAMENDA) 10 mg tablet TAKE 1 TABLET (10 MG TOTAL) BY MOUTH 2 (TWO) TIMES A DAY., Historical Med      methocarbamol (ROBAXIN) 750 mg tablet Take 1 tablet (750 mg total) by mouth every 6 (six) hours as needed for muscle spasms, Starting Tue 3/16/2021, Normal      Morphine Sulfate Microinfusion (MORPHINE SULF MICROINFUSION PF IJ) Inject 14 mg as directed daily Via infusion pump, Historical Med      nitroglycerin (NITROSTAT) 0.4 mg SL tablet Place 0.4 mg under the tongue every 5 (five) minutes as needed for chest pain (pt has not  used recently).   , Until Discontinued, Historical Med      nystatin (MYCOSTATIN) cream Apply 1 application topically 2 (two) times a day, Starting Sat 1/11/2020, Historical Med      !! omeprazole (PriLOSEC) 20 mg delayed release capsule omeprazole 20 mg capsule,delayed release, Historical Med      !! omeprazole (PriLOSEC) 40 MG capsule Take 40 mg by mouth daily, Historical Med      ondansetron (ZOFRAN) 4 mg tablet Take 1 tablet (4 mg total) by mouth every 8 (eight) hours as needed for nausea or vomiting, Starting Wed 8/11/2021, Normal      oxyCODONE-acetaminophen (PERCOCET) 5-325 mg per tablet Take 1 tablet by mouth every 4 (four) hours as needed for severe painMax Daily Amount: 6 tablets, Starting Tue 3/16/2021, Normal      Pediatric Multivit-Minerals-C (Polyvitamin/Iron) CHEW Chew 1 tablet daily, Starting Sun 8/9/2020, Historical Med      POTASSIUM PO Take 40 mEq by mouth 2 (two) times a day, Historical Med      prochlorperazine (COMPAZINE) 10 mg tablet Take 1 tablet (10 mg total) by mouth every 6 (six) hours as needed for nausea or vomiting (migraine), Starting Fri 10/18/2019, Normal      sertraline (ZOLOFT) 100 mg tablet Take 1.5 tablets (150 mg total) by mouth daily, Starting Fri 1/31/2020, No Print      sucralfate (CARAFATE) 1 g/10 mL suspension Take 10 mL (1 g total) by mouth 4 (four) times a day, Starting Wed 9/8/2021, Normal      tamsulosin (FLOMAX) 0.4 mg Take 0.4 mg by mouth daily with dinner., Until Discontinued, Historical Med      traZODone (DESYREL) 100 mg tablet Take 2 tablets (200 mg total) by mouth daily at bedtime as needed for sleep, Starting Fri 1/31/2020, Normal      [facility] SHORT PEN NEEDLES 31G X 8 MM MISC 4 INJECTIONS PER DAY DX  E11.8, Historical Med       !! - Potential duplicate medications found. Please discuss with provider. No discharge procedures on file.     PDMP Review       Value Time User    PDMP Reviewed  Yes 10/13/2020  1:12 PM Najma Dow PA-C          ED Provider  Electronically Signed by           Chau Jang DO  08/19/23 3005

## 2023-08-19 NOTE — ASSESSMENT & PLAN NOTE
Wt Readings from Last 3 Encounters:   08/19/23 (!) 138 kg (305 lb)   08/18/23 (!) 139 kg (305 lb 12.5 oz)   07/09/22 116 kg (255 lb)     Continue carvedilol  Monitor I/O, daily weights  Cardiology following

## 2023-08-19 NOTE — QUICK NOTE
62M with PMH of CAD with 6 stents in the past, DM2, HTN, depression, chronic pain on opiates, who presented with chest pain and elevated troponin s/p cath today which showed 50% LM, 40% LAD, 20% LCx-- no clear culprit. The patient was very hypertensive on arrival (pt states /130). He was started on a nitro gtt with improvement of his BP and some improvement of the CP however it is still there. Troponin after cath up to 16K and 17K. EKG shows new anterior Q waves. Bedside echo was very technically limited and the anterior wall could not be assessed but global LV appeared preserved. RV was not dilated. Clinically the patient is having an anterior MI despite the cath results from earlier today. Etiology of anterior MI could be type 2 MI from severe HTN, plaque rupture MI with reperfusion, less likely vasospasm. Will restart heparin, trend trops, titrate nitroglycerine, continue aspirin and plavix.      He did not have chest CT imaging-- will also get a STAT CTA to rule out dissection and PE.

## 2023-08-20 LAB
APTT PPP: 49 SECONDS (ref 23–37)
APTT PPP: 51 SECONDS (ref 23–37)
APTT PPP: 54 SECONDS (ref 23–37)

## 2023-08-20 PROCEDURE — 99232 SBSQ HOSP IP/OBS MODERATE 35: CPT | Performed by: INTERNAL MEDICINE

## 2023-08-20 PROCEDURE — 94664 DEMO&/EVAL PT USE INHALER: CPT

## 2023-08-20 PROCEDURE — 85730 THROMBOPLASTIN TIME PARTIAL: CPT | Performed by: INTERNAL MEDICINE

## 2023-08-20 RX ORDER — ISOSORBIDE MONONITRATE 60 MG/1
60 TABLET, EXTENDED RELEASE ORAL DAILY
Status: DISCONTINUED | OUTPATIENT
Start: 2023-08-20 | End: 2023-08-21 | Stop reason: HOSPADM

## 2023-08-20 RX ORDER — RANOLAZINE 500 MG/1
1000 TABLET, EXTENDED RELEASE ORAL EVERY 12 HOURS SCHEDULED
Status: DISCONTINUED | OUTPATIENT
Start: 2023-08-20 | End: 2023-08-21 | Stop reason: HOSPADM

## 2023-08-20 RX ADMIN — BACLOFEN 10 MG: 10 TABLET ORAL at 08:18

## 2023-08-20 RX ADMIN — CARVEDILOL 12.5 MG: 12.5 TABLET, FILM COATED ORAL at 08:18

## 2023-08-20 RX ADMIN — HEPARIN SODIUM 2000 UNITS: 1000 INJECTION INTRAVENOUS; SUBCUTANEOUS at 06:45

## 2023-08-20 RX ADMIN — FOLIC ACID 1 MG: 1 TABLET ORAL at 08:18

## 2023-08-20 RX ADMIN — AMLODIPINE BESYLATE 5 MG: 5 TABLET ORAL at 08:18

## 2023-08-20 RX ADMIN — ASPIRIN 81 MG CHEWABLE TABLET 81 MG: 81 TABLET CHEWABLE at 08:18

## 2023-08-20 RX ADMIN — HEPARIN SODIUM 25.1 UNITS/KG/HR: 10000 INJECTION, SOLUTION INTRAVENOUS at 20:20

## 2023-08-20 RX ADMIN — TAMSULOSIN HYDROCHLORIDE 0.4 MG: 0.4 CAPSULE ORAL at 17:31

## 2023-08-20 RX ADMIN — BACLOFEN 10 MG: 10 TABLET ORAL at 17:31

## 2023-08-20 RX ADMIN — BACLOFEN 10 MG: 10 TABLET ORAL at 22:00

## 2023-08-20 RX ADMIN — RANOLAZINE 500 MG: 500 TABLET, FILM COATED, EXTENDED RELEASE ORAL at 08:18

## 2023-08-20 RX ADMIN — OXYCODONE HYDROCHLORIDE 5 MG: 5 TABLET ORAL at 16:19

## 2023-08-20 RX ADMIN — GABAPENTIN 600 MG: 300 CAPSULE ORAL at 22:00

## 2023-08-20 RX ADMIN — RANOLAZINE 1000 MG: 500 TABLET, FILM COATED, EXTENDED RELEASE ORAL at 22:00

## 2023-08-20 RX ADMIN — MORPHINE SULFATE 4 MG: 4 INJECTION INTRAVENOUS at 23:10

## 2023-08-20 RX ADMIN — MENTHOL, METHYL SALICYLATE: 10; 15 CREAM TOPICAL at 12:06

## 2023-08-20 RX ADMIN — ISOSORBIDE MONONITRATE 60 MG: 60 TABLET, EXTENDED RELEASE ORAL at 17:31

## 2023-08-20 RX ADMIN — POLYETHYLENE GLYCOL 3350 17 G: 17 POWDER, FOR SOLUTION ORAL at 08:19

## 2023-08-20 RX ADMIN — OXYCODONE HYDROCHLORIDE 5 MG: 5 TABLET ORAL at 12:06

## 2023-08-20 RX ADMIN — HEPARIN SODIUM 2000 UNITS: 1000 INJECTION INTRAVENOUS; SUBCUTANEOUS at 23:10

## 2023-08-20 RX ADMIN — GABAPENTIN 600 MG: 300 CAPSULE ORAL at 17:31

## 2023-08-20 RX ADMIN — ATORVASTATIN CALCIUM 80 MG: 80 TABLET, FILM COATED ORAL at 17:31

## 2023-08-20 RX ADMIN — PANTOPRAZOLE SODIUM 40 MG: 40 TABLET, DELAYED RELEASE ORAL at 05:01

## 2023-08-20 RX ADMIN — NITROGLYCERIN 60 MCG/MIN: 20 INJECTION INTRAVENOUS at 12:06

## 2023-08-20 RX ADMIN — GABAPENTIN 600 MG: 300 CAPSULE ORAL at 08:18

## 2023-08-20 RX ADMIN — LIDOCAINE 5% 2 PATCH: 700 PATCH TOPICAL at 08:14

## 2023-08-20 RX ADMIN — HEPARIN SODIUM 2000 UNITS: 1000 INJECTION INTRAVENOUS; SUBCUTANEOUS at 14:03

## 2023-08-20 RX ADMIN — CLOPIDOGREL BISULFATE 75 MG: 75 TABLET, FILM COATED ORAL at 08:18

## 2023-08-20 RX ADMIN — SENNOSIDES AND DOCUSATE SODIUM 1 TABLET: 50; 8.6 TABLET ORAL at 08:19

## 2023-08-20 RX ADMIN — CYANOCOBALAMIN TAB 500 MCG 1000 MCG: 500 TAB at 08:18

## 2023-08-20 RX ADMIN — OXYCODONE HYDROCHLORIDE 5 MG: 5 TABLET ORAL at 22:00

## 2023-08-20 RX ADMIN — SENNOSIDES AND DOCUSATE SODIUM 1 TABLET: 50; 8.6 TABLET ORAL at 17:31

## 2023-08-20 RX ADMIN — HEPARIN SODIUM 23.1 UNITS/KG/HR: 10000 INJECTION, SOLUTION INTRAVENOUS at 06:37

## 2023-08-20 RX ADMIN — CARVEDILOL 12.5 MG: 12.5 TABLET, FILM COATED ORAL at 17:31

## 2023-08-20 NOTE — CASE MANAGEMENT
Case Management Assessment & Discharge Planning Note    Patient name Shantelle Shoulder  Location /-04 MRN [id number]  : 1960 Date 2023       Current Admission Date: 2023  Current Admission Diagnosis:Chest pain   Patient Active Problem List    Diagnosis Date Noted   • History of gastric sleeve procedure 2023   • Status post insertion of spinal cord stimulator 2021   • Skin inflammation 2020   • Aftercare following surgery 2020   • Radiculopathy, lumbosacral region 2020   • Neuropathy 2020   • Presence of intrathecal pump 2020   • Malfunction of intrathecal infusion pump 2020   • Preoperative examination 2020   • Major depressive disorder, recurrent, moderate ([address]) 2020   • COPD (chronic obstructive pulmonary disease) ([address]) 2020   • Type 2 diabetes mellitus with diabetic neuropathy, with long-term current use of insulin ([address]) 10/18/2019   • Mild cognitive impairment 10/18/2019   • Primary osteoarthritis of both knees 2019   • Orthostatic hypotension 2019   • Bilateral foot pain 2019   • Symptomatic bradycardia 2019   • Pressure injury of skin of sacral region 2019   • Pain and swelling of left knee 2019   • Fall at home 2019   • Vitamin D deficiency 2018   • Chronic migraine without aura without status migrainosus, not intractable 2018   • Esophageal dysphagia 2018   • Opioid dependence, continuous ([address]) 2017   • GERD (gastroesophageal reflux disease)    • Chest pain 10/12/2017   • Stage 3 chronic kidney disease ([address]) 10/12/2017   • Bilateral leg edema 2017   • Chronic respiratory failure ([address]) 2017   • Alzheimer disease ([address])    • Chronic pain disorder    • Other constipation    • Coronary artery disease    • Sleep apnea    • Stroke [facility])    • Stented coronary artery    • Obstructive sleep apnea syndrome    • CPAP (continuous positive airway pressure) dependence    • Brain aneurysm    • Morbid obesity ([address] ) 01/15/2016   • CVA (cerebral vascular accident) ([facility]) 01/15/2016   • Coronary artery disease involving native coronary artery 01/14/2016   • Type 2 diabetes mellitus with renal complication (HCC)    • Chronic diastolic congestive heart failure ([address])    • Hyperlipidemia 02/09/2015      LOS (days): 2  Geometric Mean LOS (GMLOS) (days):   Days to GMLOS:     OBJECTIVE:    Risk of Unplanned Readmission Score: 23.69         Current admission status: Inpatient       Preferred Pharmacy:   [facility] [address] [facility]  [address] [facility],[address]  Phone: [phone] Fax: [phone] [facility] [address] [facility] [address] [facility] [address] [facility]  Phone: [phone] Fax: [phone]    Primary Care Provider: Ugo Payne MD    Primary Insurance: MEDICARE  Secondary Insurance: [address]:  [address]     [facility], [address] [address] [place] Representative - Spouse   Primary Phone: [phone] (Mobile)                              Patient Information  Admitted from[de-identified] Home  Mental Status: Alert  During Assessment patient was accompanied by: Not accompanied during assessment  Assessment information provided by[de-identified] Patient  Primary Caregiver: Self  Support Systems: Spouse/significant other, Children  What city do you live in?: [address] [facility] entry access options.  Select all that apply.: No steps to enter home  Type of Current Residence: [facility]  In the last 12 months, was there a time when you were not able to pay the mortgage or rent on time?: No  In the last 12 months, how many places have you lived?: 1  In the last 12 months, was there a time when you did not have a steady place to sleep or slept in a shelter (including now)?: No  Homeless/housing insecurity resource given?: N/A  Living Arrangements: Lives w/ Spouse/significant other  Is patient a ?: No    Activities of Daily Living Prior to Admission  Functional Status: Independent  Completes ADLs independently?: Yes  Ambulates independently?: Yes  Does patient use assisted devices?: No  Does patient currently own DME?: Yes  What DME does the patient currently own?: [employer]  Does patient have a history of Outpatient Therapy (PT/OT)?: No  Does the patient have a history of Short-Term Rehab?: No  Does patient have a history of HHC?: No  Does patient currently have [address]?: No         Patient Information Continued  Does patient have prescription coverage?: Yes  Within the past 12 months, you worried that your food would run out before you got the money to buy more.: Never true  Within the past 12 months, the food you bought just didn't last and you didn't have money to get more.: Never true  Food insecurity resource given?: N/A  Does patient receive dialysis treatments?: No         Means of Transportation  Means of Transport to Appts[de-identified] Drives Self  In the past 12 months, has lack of transportation kept you from medical appointments or from getting medications?: No  In the past 12 months, has lack of transportation kept you from meetings, work, or from getting things needed for daily living?: No  Was application for public transport provided?: N/A        Discharge planning discussed with[de-identified] Pt  Freedom of Choice: Yes     CM contacted family/caregiver?: No- see comments             Contacts  Patient Contacts: Reshmatanner Tiffany  Relationship to Patient[de-identified] Family  Contact Method: Phone  Phone Number: [phone]  Reason/Outcome: Emergency Contact                                                                     Additional Comments: Initial CM assessment completed with pt. CM encouraged pt to reach out with any questions or needs for support while IP.

## 2023-08-20 NOTE — ASSESSMENT & PLAN NOTE
Lab Results   Component Value Date    HGBA1C 5.3 07/14/2022       Blood Sugar Average: Last 72 hrs:  · A1c 5.3  · Monitor Accu-Cheks  · Therapeutic lifestyle modification

## 2023-08-20 NOTE — ASSESSMENT & PLAN NOTE
Patient on fentanyl pump  Continue gabapentin 600 mg 4 times daily, baclofen 10 mg 3 times daily  Continue per analgesics  Supportive cares  Outpatient follow-up with pain service 64

## 2023-08-20 NOTE — ASSESSMENT & PLAN NOTE
· Presented to [facility] with CP, concern for anterior STEMI hence sent to [facility] -> underwent urgent LHC  •  Dist LM lesion is 50% stenosed. •  Ost LAD lesion is 40% stenosed. •  Mid LAD lesion is 40% stenosed.   •  Mid Cx lesion is 20% stenosed  · Currently on IV heparin GTT, IV nitro GGT per cardiology  · Continue aspirin statin Plavix beta-blocker  · Has been initiated on Ranexa per cardiology  · Lidocaine patch in place  · Today reports some improvement in his chest pain, chest pain is less intense and less persistent  · Continue supportive cares, analgesics  · Monitor closely

## 2023-08-20 NOTE — PROGRESS NOTES
[address] [facility]  Progress Note  Name: Chandrakant Christian  MRN: [id number]  Unit/Bed#: -01 I Date of Admission: 8/18/2023   Date of Service: 8/20/2023 I Hospital Day: 2    Assessment/Plan   * Chest pain  Assessment & Plan  · Presented to [facility] with CP, concern for anterior STEMI hence sent to [facility] -> underwent urgent LHC  •  Dist LM lesion is 50% stenosed. •  Ost LAD lesion is 40% stenosed. •  Mid LAD lesion is 40% stenosed.   •  Mid Cx lesion is 20% stenosed  · Currently on IV heparin GTT, IV nitro GGT per cardiology  · Continue aspirin statin Plavix beta-blocker  · Has been initiated on Ranexa per cardiology  · Lidocaine patch in place  · Today reports some improvement in his chest pain, chest pain is less intense and less persistent  · Continue supportive cares, analgesics  · Monitor closely    History of gastric sleeve procedure  Assessment & Plan  History of gastric sleeve procedure noted    Major depressive disorder, recurrent, moderate (HCC)  Assessment & Plan  · Outpatient follow-up    Type 2 diabetes mellitus with diabetic neuropathy, with long-term current use of insulin (HCC)  Assessment & Plan  Lab Results   Component Value Date    HGBA1C 5.3 07/14/2022       Blood Sugar Average: Last 72 hrs:  · A1c 5.3  · Monitor Accu-Cheks  · Therapeutic lifestyle modification    Chronic pain disorder  Assessment & Plan  Patient on fentanyl pump  Continue gabapentin 600 mg 4 times daily, baclofen 10 mg 3 times daily  Continue per analgesics  Supportive cares  Outpatient follow-up with pain service      Obstructive sleep apnea syndrome  Assessment & Plan  CPAP compliance encouraged    Coronary artery disease involving native coronary artery  Assessment & Plan  · H/o PCI x 6  · Continue aspirin Plavix beta-blocker statin  · Ranexa per cardiology  · Cardiology inputs noted      Chronic diastolic congestive heart failure (HCC)  Assessment & Plan  Wt Readings from Last 3 Encounters:   23 (!) 138 kg (305 lb)   23 (!) 139 kg (305 lb 12.5 oz)   22 116 kg (255 lb)     Continue carvedilol  Monitor I's/O, daily weights  Encourage low-salt diet  Cardiology following                     VTE Pharmacologic Prophylaxis: VTE Score: 5 High Risk (Score >/= 5) - Pharmacological DVT Prophylaxis Ordered: heparin drip. Sequential Compression Devices Ordered. Patient Centered Rounds: I performed bedside rounds with nursing staff today. Discussions with Specialists or Other Care Team Provider:     Education and Discussions with Family / Patient: Discussed with the patient, reports he is keeping his family updated. Total Time Spent on Date of Encounter in care of patient: 34 min This time was spent on one or more of the following: performing physical exam; counseling and coordination of care; obtaining or reviewing history; documenting in the medical record; reviewing/ordering tests, medications or procedures; communicating with other healthcare professionals and discussing with patient's family/caregivers.     Current Length of Stay: 2 day(s)  Current Patient Status: Inpatient   Certification Statement: The patient will continue to require additional inpatient hospital stay due to As outlined  Discharge Plan: Awaiting clinical and symptomatic improvement, presently on IV heparin, IV nitroglycerin per cardiology, awaiting cardiology inputs for disposition planning    Code Status: Level 1 - Full Code    Subjective:     Comfortably in bed  Today reports chest pain is less persistent and less intense  Reports chest pain is today 8/10 in intensity, yesterday it was 15/10  Encourage out of bed into a chair  Reports lidocaine patch is helping with the chest pain  Denies shortness of breath or diaphoresis    Objective:     Vitals:   Temp (24hrs), Av.5 °F (36.9 °C), Min:98.4 °F (36.9 °C), Max:98.8 °F (37.1 °C)    Temp:  [98.4 °F (36.9 °C)-98.8 °F (37.1 °C)] 98.4 °F (36.9 °C)  HR: [69-93] 84  Resp:  [17-20] 18  BP: (107-150)/(61-99) 124/69  SpO2:  [91 %-97 %] 96 %  Body mass index is 41.37 kg/m². Input and Output Summary (last 24 hours): Intake/Output Summary (Last 24 hours) at 8/20/2023 1232  Last data filed at 8/20/2023 0501  Gross per 24 hour   Intake 292.32 ml   Output 400 ml   Net -107.68 ml       Physical Exam:   Physical Exam       Comfortably in bed  Obese  Short thick neck  Lungs diminished breath sounds bilateral  Heart sounds S1-S2 noted  Heart sounds are distant  Abdomen soft nontender  Abdominal obesity noted  Awake and alert obey simple commands  No pedal edema  No rash    Additional Data:     Labs:  Results from last 7 days   Lab Units 08/19/23  0436 08/18/23  1006 08/18/23  0818   WBC Thousand/uL 14.89*   < > 13.75*   HEMOGLOBIN g/dL 15.2   < > 15.1   HEMATOCRIT % 46.0   < > 46.1   PLATELETS Thousands/uL 271   < > 270   NEUTROS PCT %  --   --  65   LYMPHS PCT %  --   --  24   MONOS PCT %  --   --  7   EOS PCT %  --   --  3    < > = values in this interval not displayed.      Results from last 7 days   Lab Units 08/19/23  0436   SODIUM mmol/L 141   POTASSIUM mmol/L 3.6   CHLORIDE mmol/L 111*   CO2 mmol/L 21   BUN mg/dL 10   CREATININE mg/dL 1.18   ANION GAP mmol/L 9   CALCIUM mg/dL 8.9   ALBUMIN g/dL 3.1*   TOTAL BILIRUBIN mg/dL 0.69   ALK PHOS U/L 79   ALT U/L 35   AST U/L 57*   GLUCOSE RANDOM mg/dL 126                       Lines/Drains:  Invasive Devices     Peripheral Intravenous Line  Duration           Peripheral IV 08/18/23 Right Antecubital 2 days    Peripheral IV 08/18/23 Right;Ventral (anterior) Forearm 1 day                  Telemetry:  Telemetry Orders (From admission, onward)             24 Hour Telemetry Monitoring  Continuous x 24 Hours (Telem)        Question:  Reason for 24 Hour Telemetry  Answer:  PCI/EP study (including pacer and ICD implementation), Cardiac surgery, MI, abnormal cardiac cath, and chest pain- rule out MI                 Telemetry Reviewed: Sinus rhythm  Indication for Continued Telemetry Use: Acute MI/Unstable Angina/Rule out ACS             Imaging: Reviewed radiology reports from this admission including: ECHO    Recent Cultures (last 7 days):         Last 24 Hours Medication List:   Current Facility-Administered Medications   Medication Dose Route Frequency Provider Last Rate   • acetaminophen  975 mg Oral Q8H PRN Rhoda Carrel, MD     • albuterol  2 puff Inhalation Q4H PRN Rhoda Carrel, MD     • albuterol  2.5 mg Nebulization Q4H PRN Rhoda Carrel, MD     • amLODIPine  5 mg Oral Daily Rhoda Carrel, MD     • aspirin  81 mg Oral Daily Rhoda Carrel, MD     • atorvastatin  80 mg Oral After Shannan Simons MD     • baclofen  10 mg Oral TID Rhoda Carrel, MD     • carvedilol  12.5 mg Oral BID With Meals Rhoda Carrel, MD     • clopidogrel  75 mg Oral Daily Rhoda Carrel, MD     • vitamin B-12  1,000 mcg Oral Daily Rhoda Carrel, MD     • folic acid  1 mg Oral Daily Rhoda Carrel, MD     • gabapentin  600 mg Oral TID Rhoda Carrel, MD     • heparin (porcine)  3-24 Units/kg/hr (Order-Specific) Intravenous Titrated ANTHONY Sheffield-C 23.1 Units/kg/hr (08/20/23 1226)   • heparin (porcine)  2,000 Units Intravenous Q6H PRN Joyce Hays MD     • heparin (porcine)  4,000 Units Intravenous Q6H PRN Joyce Hays MD     • lidocaine  2 patch Topical Daily Lesvia Roman MD     • menthol-methyl salicylate   Apply externally 4x Daily PRN Lesvia Roman MD     • morphine injection  4 mg Intravenous Q4H PRN ANTHONY Sheffield-C     • nitroGLYcerin  5-200 mcg/min Intravenous Titrated ANTHONY Sheffield-C 60 mcg/min (08/20/23 1206)   • ondansetron  4 mg Intravenous Q6H PRN Renate Johnson MD     • oxyCODONE  5 mg Oral Q4H PRN Rhoda Carrel, MD     • pantoprazole  40 mg Oral Early Morning Rhoda Carrel, MD     • polyethylene glycol  17 g Oral Daily Refugio Severe, MD     • ranolazine  500 mg Oral Q12H Ollie Etienne MD     • senna-docusate sodium  1 tablet Oral BID Refugio Severe, MD     • tamsulosin  0.4 mg Oral Daily With Patience Matthew MD          Today, Patient Was Seen By: Geroge Dandy, MD    **Please Note: This note may have been constructed using a voice recognition system. **

## 2023-08-20 NOTE — ASSESSMENT & PLAN NOTE
· H/o PCI x 6  · Continue aspirin Plavix beta-blocker statin  · Ranexa per cardiology  · Cardiology inputs noted

## 2023-08-20 NOTE — PROGRESS NOTES
General Cardiology Progress Note   Tessy Castle 63 y.o. male MRN: [id number]  Unit/Bed#: -01 Encounter: [id number]      Assessment:  Principal Problem:    Chest pain  Active Problems:    Chronic diastolic congestive heart failure (HCC)    Coronary artery disease involving native coronary artery    CVA (cerebral vascular accident) ([address])    Obstructive sleep apnea syndrome    Alzheimer disease ([address])    Chronic pain disorder    Opioid dependence, continuous ([address])    Type 2 diabetes mellitus with diabetic neuropathy, with long-term current use of insulin (HCC)    Major depressive disorder, recurrent, moderate (HCC)    History of gastric sleeve procedure      Impression:    • NSTEMI  o Peak troponin 17,000  o Ischemic lateral T wave inversions initially, have resolved  o He continues to complain of chest pain. He appears more comfortable than he relays in terms of discomfort.  o No large vessel stenosis identified as a culprit for his NSTEMI  o On closer review of the catheterization, appears his large first septal might be the culprit, there is ERIKA II flow, and his echo personally reviewed today shows a basal to mid focal anteroseptal wall defect.   • Morbid obesity, despite history of gastric sleeve  • Coronary artery disease  o First MI 2010  o Drug-eluting stent x1 to the circumflex in 2015  o Drug-eluting stent to the mid RCA in 2017.  o Drug-eluting stent to the proximal LAD in 2020  o Drug-eluting stent to the proximal and mid LAD in 2022  o Previous preserved EF without regional wall motion abnormalities per echoes at [address]  o His [facility] cardiologist left the network, has been having trouble getting refills of meds  o He prefers to follow-up with [facility] cardiology at [facility]  • Type 2 diabetes  • Chronic pain on a fentanyl pump    Plan:    • Holding lisinopril in the presence of normal left ventricular function, to afford more ability to uptitrate antianginal therapy. • Ranexa added yesterday, considering his body size, will uptitrate to Ranexa 1000 mg p.o. twice daily  • Nitroglycerin drip unchanged, will wean off and transition to isosorbide mononitrate  • Blood pressures are stable, no hypotension  • Despite having ongoing chest discomfort, he holds a very talkative conversation without looking like he is in much discomfort at all, took his oxygen off, saturating 94% on room air with no desaturations with conversation. • He is also starting to approach discharge planning, hopefully ready by tomorrow. Subjective:   Patient seen and examined. He said his for elephant on his chest decreased to 2 elephants, but is back to 4 elephants, but he looks very comfortable, he is still on 2 L nasal cannula, he continues to be on a heparin and nitroglycerin drip. He thinks the Ranexa started yesterday had some improvement    Review of Systems   Constitutional: Negative for diaphoresis, fever, malaise/fatigue and night sweats. Cardiovascular: Positive for chest pain. Negative for dyspnea on exertion, leg swelling, orthopnea, palpitations and syncope. Respiratory: Negative for cough, shortness of breath, sputum production and wheezing. Skin: Negative for itching and rash. Gastrointestinal: Negative for abdominal pain, change in bowel habit and diarrhea. Neurological: Negative for dizziness, focal weakness, light-headedness and weakness. Objective   Vitals: Blood pressure 124/68, pulse 80, temperature 98.8 °F (37.1 °C), resp. rate 20, height 6' (1.829 m), weight (!) 138 kg (305 lb), SpO2 96 %. , Body mass index is 41.37 kg/m²., I/O last 3 completed shifts: In: 292.3 [P.O.:30; I.V.:262.3]  Out: 2400 [Urine:2400]  No intake/output data recorded.   Wt Readings from Last 3 Encounters:   08/19/23 (!) 138 kg (305 lb)   08/18/23 (!) 139 kg (305 lb 12.5 oz)   07/09/22 116 kg (255 lb)       Intake/Output Summary (Last 24 hours) at 8/20/2023 0901  Last data filed at 8/20/2023 0501  Gross per 24 hour   Intake 292.32 ml   Output 900 ml   Net -607.68 ml     I/O last 3 completed shifts: In: 292.3 [P.O.:30; I.V.:262.3]  Out: 2400 [Urine:2400]      Physical Exam  Vitals reviewed. Constitutional:       General: He is not in acute distress. Appearance: He is obese. He is not diaphoretic. HENT:      Head: Normocephalic and atraumatic. Neck:      Vascular: No JVD. Cardiovascular:      Rate and Rhythm: Normal rate and regular rhythm. Heart sounds: Normal heart sounds. No murmur heard. Pulmonary:      Effort: Pulmonary effort is normal. No respiratory distress. Breath sounds: Normal breath sounds. No wheezing or rales. Abdominal:      General: Bowel sounds are normal. There is no distension. Palpations: Abdomen is soft. Tenderness: There is no abdominal tenderness. Musculoskeletal:      Cervical back: Normal range of motion and neck supple. Right lower leg: No edema. Left lower leg: No edema. Skin:     General: Skin is warm and dry. Neurological:      Mental Status: He is alert and oriented to person, place, and time.          Meds/Allergies   No Known Allergies    Current Facility-Administered Medications:   •  acetaminophen (TYLENOL) tablet 975 mg, 975 mg, Oral, Q8H PRN, Latrell Conway MD, 975 mg at 08/19/23 0614  •  albuterol (PROVENTIL HFA,VENTOLIN HFA) inhaler 2 puff, 2 puff, Inhalation, Q4H PRN, Renate Johnson MD  •  albuterol inhalation solution 2.5 mg, 2.5 mg, Nebulization, Q4H PRN, Renate Johnson MD  •  amLODIPine (NORVASC) tablet 5 mg, 5 mg, Oral, Daily, Renate Johnson MD, 5 mg at 08/20/23 0818  •  aspirin chewable tablet 81 mg, 81 mg, Oral, Daily, Renate Johnson MD, 81 mg at 08/20/23 0818  •  atorvastatin (LIPITOR) tablet 80 mg, 80 mg, Oral, After Jason Barry MD, 80 mg at 08/19/23 1720  •  baclofen tablet 10 mg, 10 mg, Oral, TID, Latrell MD Man, 10 mg at 08/20/23 0818  •  carvedilol (COREG) tablet 12.5 mg, 12.5 mg, Oral, BID With Meals, Palma Mccullough MD, 12.5 mg at 08/20/23 0818  •  clopidogrel (PLAVIX) tablet 75 mg, 75 mg, Oral, Daily, Palma Mccullough MD, 75 mg at 08/20/23 0818  •  cyanocobalamin (VITAMIN B-12) tablet 1,000 mcg, 1,000 mcg, Oral, Daily, Palma Mccullough MD, 1,000 mcg at 01/28/33 5394  •  folic acid (FOLVITE) tablet 1 mg, 1 mg, Oral, Daily, Palma Mccullough MD, 1 mg at 08/20/23 0818  •  gabapentin (NEURONTIN) capsule 600 mg, 600 mg, Oral, TID, Palma Mccullough MD, 600 mg at 08/20/23 0818  •  heparin (porcine) 25,000 units in 0.45% NaCl 250 mL infusion (premix), 3-24 Units/kg/hr (Order-Specific), Intravenous, Titrated, Yessi Maxin, PA-C, Last Rate: 20.8 mL/hr at 08/20/23 0637, 23.1 Units/kg/hr at 08/20/23 1918  •  heparin (porcine) injection 2,000 Units, 2,000 Units, Intravenous, Q6H PRN, Bro Quinn MD, 2,000 Units at 08/20/23 0645  •  heparin (porcine) injection 4,000 Units, 4,000 Units, Intravenous, Q6H PRN, Bro Quinn MD, 4,000 Units at 08/19/23 1532  •  lidocaine (LIDODERM) 5 % patch 2 patch, 2 patch, Topical, Daily, Maureen Cordoba MD, 2 patch at 08/20/23 2100  •  menthol-methyl salicylate (BENGAY) 75-51 % cream, , Apply externally, 4x Daily PRN, Maureen Cordoba MD  •  morphine injection 4 mg, 4 mg, Intravenous, Q4H PRN, Yessi Maxin, PA-C, 4 mg at 08/19/23 1721  •  nitroGLYcerin (TRIDIL) 50 mg in 250 mL infusion (premix), 5-200 mcg/min, Intravenous, Titrated, Yessi Maxin, PA-C, Last Rate: 18 mL/hr at 08/19/23 2131, 60 mcg/min at 08/19/23 2131  •  ondansetron (ZOFRAN) injection 4 mg, 4 mg, Intravenous, Q6H PRN, Palma Mccullough MD, 4 mg at 08/18/23 2041  •  oxyCODONE (ROXICODONE) IR tablet 5 mg, 5 mg, Oral, Q4H PRN, Palma Mccullough MD, 5 mg at 08/19/23 1405  •  pantoprazole (PROTONIX) EC tablet 40 mg, 40 mg, Oral, Early Morning, Renate Johnson MD, 40 mg at 08/20/23 0501  •  polyethylene glycol (MIRALAX) packet 17 g, 17 g, Oral, Daily, Sharon White MD, 17 g at 08/20/23 0819  •  ranolazine (RANEXA) 12 hr tablet 500 mg, 500 mg, Oral, Q12H [address], Gulshan Polk MD, 500 mg at 08/20/23 0818  •  senna-docusate sodium (SENOKOT S) 8.6-50 mg per tablet 1 tablet, 1 tablet, Oral, BID, Sharon White MD, 1 tablet at 08/20/23 9876  •  tamsulosin (FLOMAX) capsule 0.4 mg, 0.4 mg, Oral, Daily With Lashawn Ulrich MD, 0.4 mg at 08/19/23 1720    Laboratory Results:        CBC with diff:   Results from last 7 days   Lab Units 08/19/23  0436 08/18/23  1605 08/18/23  1006 08/18/23  0818   WBC Thousand/uL 14.89*  --  14.42* 13.75*   HEMOGLOBIN g/dL 15.2  --  14.9 15.1   HEMATOCRIT % 46.0  --  44.6 46.1   MCV fL 91  --  90 90   PLATELETS Thousands/uL 271 266 242 270   RBC Million/uL 5.05  --  4.97 5.12   MCH pg 30.1  --  30.0 29.5   MCHC g/dL 33.0  --  33.4 32.8   RDW % 13.0  --  12.3 12.3   MPV fL 10.2 10.2 10.1 10.0   NRBC AUTO /100 WBCs  --   --   --  0       CMP:  Results from last 7 days   Lab Units 08/19/23  0436 08/18/23  0818   SODIUM mmol/L 141 138   POTASSIUM mmol/L 3.6 3.6   CHLORIDE mmol/L 111* 106   CO2 mmol/L 21 25   BUN mg/dL 10 9   CREATININE mg/dL 1.18 0.99   CALCIUM mg/dL 8.9 9.3   AST U/L 57*  --    ALT U/L 35  --    ALK PHOS U/L 79  --    EGFR ml/min/1.73sq m 66 82       BMP:  Results from last 7 days   Lab Units 08/19/23  0436 08/18/23  0818   SODIUM mmol/L 141 138   POTASSIUM mmol/L 3.6 3.6   CHLORIDE mmol/L 111* 106   CO2 mmol/L 21 25   BUN mg/dL 10 9   CREATININE mg/dL 1.18 0.99   CALCIUM mg/dL 8.9 9.3       NT-proBNP: No results for input(s): "NTBNP" in the last 72 hours.      Magnesium:   Results from last 7 days   Lab Units 08/19/23  0436   MAGNESIUM mg/dL 2.5       Coags:   Results from last 7 days   Lab Units 08/20/23  0503 08/19/23  2145 08/19/23  1441 08/19/23  0436 08/18/23  2219   PTT seconds 49* 44* 35 32 29       TSH:    Results from last 7 days   Lab Units 08/18/23  0818   TSH 3RD GENERATON uIU/mL 2.656       Hemoglobin A1C )      Lipid Profile:   Lab Results   Component Value Date    CHOL 119 01/06/2015    CHOL 134 04/04/2014     Lab Results   Component Value Date    HDL 30 (L) 08/19/2023    HDL 28 (L) 10/07/2020    HDL 27 (L) 11/04/2017     Lab Results   Component Value Date    LDLCALC 81 08/19/2023    LDLCALC 98 10/07/2020    LDLCALC 48 11/04/2017     No results found for: "LDLDIRECT"  Lab Results   Component Value Date    TRIG 130 08/19/2023    TRIG 107 10/07/2020    TRIG 177 (H) 11/04/2017       Cardiac testing:   EKG personally reviewed by Anh Munoz MD.     Cath:  ECHO:  Stress TEST:  Other:        Anh Munoz MD    Portions of the record may have been created with voice recognition software. Occasional wrong word or "sound a like" substitutions may have occurred due to the inherent limitations of voice recognition software. Read the chart carefully and recognize, using context, where substitutions have occurred.

## 2023-08-20 NOTE — ASSESSMENT & PLAN NOTE
Wt Readings from Last 3 Encounters:   08/19/23 (!) 138 kg (305 lb)   08/18/23 (!) 139 kg (305 lb 12.5 oz)   07/09/22 116 kg (255 lb)     Continue carvedilol  Monitor I's/O, daily weights  Encourage low-salt diet  Cardiology following

## 2023-08-21 VITALS
DIASTOLIC BLOOD PRESSURE: 69 MMHG | RESPIRATION RATE: 18 BRPM | HEIGHT: 72 IN | HEART RATE: 68 BPM | SYSTOLIC BLOOD PRESSURE: 140 MMHG | OXYGEN SATURATION: 96 % | BODY MASS INDEX: 41.31 KG/M2 | WEIGHT: 305 LBS | TEMPERATURE: 98 F

## 2023-08-21 LAB — APTT PPP: 74 SECONDS (ref 23–37)

## 2023-08-21 PROCEDURE — 99232 SBSQ HOSP IP/OBS MODERATE 35: CPT | Performed by: INTERNAL MEDICINE

## 2023-08-21 PROCEDURE — 85730 THROMBOPLASTIN TIME PARTIAL: CPT | Performed by: INTERNAL MEDICINE

## 2023-08-21 PROCEDURE — 99239 HOSP IP/OBS DSCHRG MGMT >30: CPT | Performed by: INTERNAL MEDICINE

## 2023-08-21 RX ORDER — ASPIRIN 81 MG/1
81 TABLET, CHEWABLE ORAL DAILY
Qty: 30 TABLET | Refills: 0 | Status: SHIPPED | OUTPATIENT
Start: 2023-08-22 | End: 2023-09-08 | Stop reason: SDUPTHER

## 2023-08-21 RX ORDER — RANOLAZINE 1000 MG/1
1000 TABLET, EXTENDED RELEASE ORAL EVERY 12 HOURS SCHEDULED
Qty: 60 TABLET | Refills: 0 | Status: SHIPPED | OUTPATIENT
Start: 2023-08-21 | End: 2023-09-08 | Stop reason: SDUPTHER

## 2023-08-21 RX ORDER — CARVEDILOL 12.5 MG/1
12.5 TABLET ORAL 2 TIMES DAILY WITH MEALS
Qty: 60 TABLET | Refills: 0 | Status: SHIPPED | OUTPATIENT
Start: 2023-08-21 | End: 2023-09-08 | Stop reason: SDUPTHER

## 2023-08-21 RX ORDER — AMLODIPINE BESYLATE 5 MG/1
5 TABLET ORAL DAILY
Qty: 30 TABLET | Refills: 0 | Status: SHIPPED | OUTPATIENT
Start: 2023-08-22 | End: 2023-09-08 | Stop reason: SDUPTHER

## 2023-08-21 RX ORDER — ISOSORBIDE MONONITRATE 60 MG/1
60 TABLET, EXTENDED RELEASE ORAL DAILY
Qty: 30 TABLET | Refills: 0 | Status: SHIPPED | OUTPATIENT
Start: 2023-08-22 | End: 2023-09-21

## 2023-08-21 RX ORDER — ATORVASTATIN CALCIUM 80 MG/1
80 TABLET, FILM COATED ORAL
Qty: 30 TABLET | Refills: 0 | Status: SHIPPED | OUTPATIENT
Start: 2023-08-21 | End: 2023-09-08 | Stop reason: SDUPTHER

## 2023-08-21 RX ADMIN — ISOSORBIDE MONONITRATE 60 MG: 60 TABLET, EXTENDED RELEASE ORAL at 08:21

## 2023-08-21 RX ADMIN — HEPARIN SODIUM 27.1 UNITS/KG/HR: 10000 INJECTION, SOLUTION INTRAVENOUS at 08:19

## 2023-08-21 RX ADMIN — SENNOSIDES AND DOCUSATE SODIUM 1 TABLET: 50; 8.6 TABLET ORAL at 08:21

## 2023-08-21 RX ADMIN — FOLIC ACID 1 MG: 1 TABLET ORAL at 08:21

## 2023-08-21 RX ADMIN — PANTOPRAZOLE SODIUM 40 MG: 40 TABLET, DELAYED RELEASE ORAL at 05:09

## 2023-08-21 RX ADMIN — CYANOCOBALAMIN TAB 500 MCG 1000 MCG: 500 TAB at 08:21

## 2023-08-21 RX ADMIN — CLOPIDOGREL BISULFATE 75 MG: 75 TABLET, FILM COATED ORAL at 08:21

## 2023-08-21 RX ADMIN — GABAPENTIN 600 MG: 300 CAPSULE ORAL at 08:20

## 2023-08-21 RX ADMIN — RANOLAZINE 1000 MG: 500 TABLET, FILM COATED, EXTENDED RELEASE ORAL at 08:22

## 2023-08-21 RX ADMIN — CARVEDILOL 12.5 MG: 12.5 TABLET, FILM COATED ORAL at 08:20

## 2023-08-21 RX ADMIN — ASPIRIN 81 MG CHEWABLE TABLET 81 MG: 81 TABLET CHEWABLE at 08:21

## 2023-08-21 RX ADMIN — AMLODIPINE BESYLATE 5 MG: 5 TABLET ORAL at 08:27

## 2023-08-21 RX ADMIN — ONDANSETRON 4 MG: 2 INJECTION INTRAMUSCULAR; INTRAVENOUS at 03:45

## 2023-08-21 RX ADMIN — BACLOFEN 10 MG: 10 TABLET ORAL at 08:20

## 2023-08-21 NOTE — NURSING NOTE
Called to patients room by patient because he was upset from an encounter with another nurse on the unit. Patient stated he wanted to be transferred to another unit. Expressed to him that there were no available beds in the facility. Patient requesting to leave AMA or transfer to another [facility]. Patient was visibly upset and making threatening comments to staff about his sons coming to the hospital. Continued to be loud with staff despite calm approach and attempting therapeutic communication. Security at bedside but aggravated patient more so was asked to wait in barfield. supervisor notified and outside of room as well in order to prevent further escalation of events. This RN was able to deescalate patient with his daughter on the phone. Informed patient that only I would provide care for the rest of the shift and pass onto day shift his concerns. He was agreeable to the circumstances. Patient calmed down and apologized for his behavior. Instructed patient that his wife who was in the lobby could come up to visit after speaking with supervisor but no other visitors would be allowed for the rest of the night.

## 2023-08-21 NOTE — PLAN OF CARE
Problem: CARDIOVASCULAR - ADULT  Goal: Maintains optimal cardiac output and hemodynamic stability  Description: INTERVENTIONS:  - Monitor I/O, vital signs and rhythm  - Monitor for S/S and trends of decreased cardiac output  - Administer and titrate ordered vasoactive medications to optimize hemodynamic stability  - Assess quality of pulses, skin color and temperature  - Assess for signs of decreased coronary artery perfusion  - Instruct patient to report change in severity of symptoms  Outcome: Progressing  Goal: Absence of cardiac dysrhythmias or at baseline rhythm  Description: INTERVENTIONS:  - Continuous cardiac monitoring, vital signs, obtain 12 lead EKG if ordered  - Administer antiarrhythmic and heart rate control medications as ordered  - Monitor electrolytes and administer replacement therapy as ordered  Outcome: Progressing     Problem: RESPIRATORY - ADULT  Goal: Achieves optimal ventilation and oxygenation  Description: INTERVENTIONS:  - Assess for changes in respiratory status  - Assess for changes in mentation and behavior  - Position to facilitate oxygenation and minimize respiratory effort  - Oxygen administered by appropriate delivery if ordered  - Initiate smoking cessation education as indicated  - Encourage broncho-pulmonary hygiene including cough, deep breathe, Incentive Spirometry  - Assess the need for suctioning and aspirate as needed  - Assess and instruct to report SOB or any respiratory difficulty  - Respiratory Therapy support as indicated  Outcome: Progressing     Problem: Knowledge Deficit  Goal: Patient/family/caregiver demonstrates understanding of disease process, treatment plan, medications, and discharge instructions  Description: Complete learning assessment and assess knowledge base.   Interventions:  - Provide teaching at level of understanding  - Provide teaching via preferred learning methods  Outcome: Progressing

## 2023-08-21 NOTE — ASSESSMENT & PLAN NOTE
Lab Results   Component Value Date    HGBA1C 5.3 07/14/2022       Blood Sugar Average: Last 72 hrs:  · A1c 5.3  · Monitor Accu-Cheks  · Outpatient follow-up

## 2023-08-21 NOTE — ASSESSMENT & PLAN NOTE
· H/o PCI x 6  · Continue aspirin Plavix beta-blocker statin Ranexa, Imdur  · Outpatient cardiology follow-up

## 2023-08-21 NOTE — DISCHARGE SUMMARY
[address] [facility]  Discharge- Orien Poster 1960, 63 y.o. male MRN: [id number]  Unit/Bed#: [place]-01 Encounter: [id number]  Primary Care Provider: Mary Newman MD   Date and time admitted to hospital: 8/18/2023 10:39 AM    * Chest pain  Assessment & Plan  · Presented to [facility] with CP, concern for anterior STEMI hence sent to [facility] -> underwent urgent LHC  •  Dist LM lesion is 50% stenosed. •  Ost LAD lesion is 40% stenosed. •  Mid LAD lesion is 40% stenosed. •  Mid Cx lesion is 20% stenosed  · Patient was provided with IV heparin GTT, IV nitro gtt.   · Continue aspirin statin Plavix beta-blocker  · Ranexa 1000 mg twice daily per cardiology  · Now on Imdur 60 mg p.o. daily  · Reports chest pain is significantly improved  · Cardiology inputs noted  · Cardiology plans for outpatient follow-up noted      History of gastric sleeve procedure  Assessment & Plan  History of gastric sleeve procedure noted    Major depressive disorder, recurrent, moderate (HCC)  Assessment & Plan  · Outpatient follow-up    Type 2 diabetes mellitus with diabetic neuropathy, with long-term current use of insulin (HCC)  Assessment & Plan  Lab Results   Component Value Date    HGBA1C 5.3 07/14/2022       Blood Sugar Average: Last 72 hrs:  · A1c 5.3  · Monitor Accu-Cheks  · Outpatient follow-up    Orthostatic hypotension  Assessment & Plan  History of orthostatic hypotension  Blood pressure stable, he is tolerating amlodipine carvedilol  Hold fludrocortisone at discharge  Outpatient follow-up    Chronic pain disorder  Assessment & Plan  Patient on fentanyl pump  Continue gabapentin 600 mg 4 times daily, baclofen 10 mg 3 times daily  Outpatient follow-up with pain service      Obstructive sleep apnea syndrome  Assessment & Plan  CPAP compliance encouraged    Coronary artery disease involving native coronary artery  Assessment & Plan  · H/o PCI x 6  · Continue aspirin Plavix beta-blocker statin Ranexa, Imdur  · Outpatient cardiology follow-up      Chronic diastolic congestive heart failure [facility])  Assessment & Plan  Wt Readings from Last 3 Encounters:   08/19/23 (!) 138 kg (305 lb)   08/18/23 (!) 139 kg (305 lb 12.5 oz)   07/09/22 116 kg (255 lb)     Continue carvedilol 12.5 mg twice daily  Less than 2 g salt diet, fluid restriction  Outpatient follow-up            Discharge Summary - [facility]    Patient Information: Eamon Grover 63 y.o. male MRN: [id number]  Unit/Bed#: -01 Encounter: [id number]    Discharging Physician / Practitioner: Lesvia Roman MD  PCP: Danielle Son MD  Admission Date: 8/18/2023  Discharge Date: 08/21/23    Disposition:     Home    Reason for Admission: Chest pain    Discharge Diagnoses:     Principal Problem:    Chest pain  Active Problems:    Chronic diastolic congestive heart failure (HCC)    Coronary artery disease involving native coronary artery    CVA (cerebral vascular accident) ([address])    Obstructive sleep apnea syndrome    Alzheimer disease ([address])    Chronic pain disorder    Opioid dependence, continuous ([address])    Orthostatic hypotension    Type 2 diabetes mellitus with diabetic neuropathy, with long-term current use of insulin (HCC)    Major depressive disorder, recurrent, moderate ([address])    History of gastric sleeve procedure  Resolved Problems:    * No resolved hospital problems. *      Consultations During Hospital Stay:  Cardiology  Procedures Performed:     Chest x-ray no active lung disease  Chest abdomen pelvis no evidence of pulm embolism, no evidence of acute pathology throughout chest abdominal pelvis  Cardiac catheterization  2D echo EF 65%, regional wall motion abnormalities, grade 1 diastolic dysfunction      Hospital Course:     Eamon Grover is a 63 y.o. male patient who originally presented to the hospital on 8/18/2023 due to chest pain.   Patient with history of coronary disease PCI x6 hypoxic respiratory failure depression chronic pain continuous opioid dependence diabetes mellitus type 2 initially presented to [facility] with chest pain. Initially there was a concern for anterior ST elevation MI and sent to [facility] for left heart catheterization. Cardiac catheterization did not show significant blockage, he was admitted to [facility] for further evaluation and care. Given ongoing chest pain he was placed on IV heparin and IV nitroglycerin drip. He was closely monitored by cardiology. He has been placed on Ranexa 1 mg twice daily, IV nitroglycerin transition to Imdur 60 mg daily as well as amlodipine. He is tolerating these medications well. He has history of orthostatic hypotension on fludrocortisone, fludrocortisone discontinued at discharge due to well tolerance of blood pressures with these medications. He reports chest pain is improved today. Reports feeling better, hemodynamically stable and is deemed ready for discharge today. Kindly review the chart for details. Condition at Discharge: fair     Discharge Day Visit / Exam:     Subjective:      Comfortably in bed  Reports feeling better  Chest pain is improved  His wife is at bedside. They are agreeable to discharge plan    Vitals: Blood Pressure: 140/69 (08/21/23 1130)  Pulse: 68 (08/21/23 1130)  Temperature: 98 °F (36.7 °C) (08/21/23 1130)  Temp Source: Oral (08/21/23 1130)  Respirations: 18 (08/21/23 1130)  Height: 6' (182.9 cm) (08/19/23 1013)  Weight - Scale: (!) 138 kg (305 lb) (08/19/23 1013)  SpO2: 96 % (08/21/23 1130)  Exam:   Physical Exam    Comfortably in bed  Obese  Short thick neck  Lungs diminished breath sounds bilateral  Heart sounds S1 and S2 noted  Heart sounds are distant  Abdomen soft nontender  Abdominal obesity noted  Awake and alert obey simple commands  No rash    Discharge instructions/Information to patient and family:   See after visit summary for information provided to patient and family. Discharge plan discussed with the patient, updated spouse at bedside and outpatient follow-up with cardiology, primary care physician    Provisions for Follow-Up Care:  See after visit summary for information related to follow-up care and any pertinent home health orders. Planned Readmission: no     Discharge Statement:  I spent 43 minutes discharging the patient. This time was spent on the day of discharge. I had direct contact with the patient on the day of discharge. Greater than 50% of the total time was spent examining patient, answering all patient questions, arranging and discussing plan of care with patient as well as directly providing post-discharge instructions. Additional time then spent on discharge activities. Discharge Medications:  See after visit summary for reconciled discharge medications provided to patient and family.       ** Please Note: This note has been constructed using a voice recognition system **

## 2023-08-21 NOTE — ASSESSMENT & PLAN NOTE
· Presented to [facility] with CP, concern for anterior STEMI hence sent to [facility] -> underwent urgent LHC  •  Dist LM lesion is 50% stenosed. •  Ost LAD lesion is 40% stenosed. •  Mid LAD lesion is 40% stenosed. •  Mid Cx lesion is 20% stenosed  · Patient was provided with IV heparin GTT, IV nitro gtt.   · Continue aspirin statin Plavix beta-blocker  · Ranexa 1000 mg twice daily per cardiology  · Now on Imdur 60 mg p.o. daily  · Reports chest pain is significantly improved  · Cardiology inputs noted  · Cardiology plans for outpatient follow-up noted

## 2023-08-21 NOTE — ASSESSMENT & PLAN NOTE
Patient on fentanyl pump  Continue gabapentin 600 mg 4 times daily, baclofen 10 mg 3 times daily  Outpatient follow-up with pain service

## 2023-08-21 NOTE — ASSESSMENT & PLAN NOTE
History of orthostatic hypotension  Blood pressure stable, he is tolerating amlodipine carvedilol  Hold fludrocortisone at discharge  Outpatient follow-up

## 2023-08-21 NOTE — ASSESSMENT & PLAN NOTE
Wt Readings from Last 3 Encounters:   08/19/23 (!) 138 kg (305 lb)   08/18/23 (!) 139 kg (305 lb 12.5 oz)   07/09/22 116 kg (255 lb)     Continue carvedilol 12.5 mg twice daily  Less than 2 g salt diet, fluid restriction  Outpatient follow-up

## 2023-08-21 NOTE — PROGRESS NOTES
General Cardiology   Progress Note -  Team One   Natalie Ngo 63 y.o. male MRN: [id number]    Unit/Bed#: -01 Encounter: [id number]    Assessment:    1. NSTEMI/CAD: Peak troponin 17,000. EKG with lateral T wave inversions and initially that subsequently resolved. Cardiac catheterization did not reveal any critical lesions and medical management was recommended. · Patient currently on aspirin, Plavix, beta-blocker, statin, Imdur, Ranexa and heparin infusion  · CAD history as follows:  · First MI 2010  · Drug-eluting stent x1 to the circumflex in 2015  · Drug-eluting stent to the mid RCA in 2017. · Drug-eluting stent to the proximal LAD in 2020  · Drug-eluting stent to the proximal and mid LAD in 2022  · Previous preserved EF without regional wall motion abnormalities per echoes at [facility]  · His [facility] cardiologist left the network, has been having trouble getting refills of meds  · He prefers to follow-up with [facility] cardiology at [facility]    2. Preserved biventricular systolic function: EF 49% with hypokinesis of the basal mid anteroseptal wall, G1DD, normal RV function, no significant valvular abnormality. 3.  Type II DM: Hemoglobin A1c 5.3 in 7/2022. Management per primary team.    4.  Chronic pain: On fentanyl pump    5. Morbid obesity: With history of prior gastric sleeve. BMI 41. Plan/Recommendations:  · Discontinue heparin infusion  · Continue aspirin, Plavix, beta-blocker, statin, Imdur and Ranexa  · Patient likely stable for discharge from a cardiac standpoint  · Please await attending attestation for final recommendations  · [facility]    ___________________________________________________________________    Subjective    Patient seen and examined. Patient states last evening he was upset with his nurse and his chest pain went up to 6 elephant sitting on his chest now it is back down to 2 elephants.   He appears comfortable talking to me with oxygen saturation of 96% and overall he feels better. Review of Systems   Constitutional: Negative. Negative for chills. Cardiovascular: Positive for chest pain. Negative for dyspnea on exertion, leg swelling, near-syncope, orthopnea, palpitations, paroxysmal nocturnal dyspnea and syncope. Respiratory: Negative. Negative for cough, shortness of breath and wheezing. Endocrine: Negative. Hematologic/Lymphatic: Negative. Skin: Negative. Musculoskeletal: Negative. Gastrointestinal: Negative. Negative for diarrhea, nausea and vomiting. Neurological: Negative for dizziness, light-headedness and weakness. Psychiatric/Behavioral: Negative. Negative for altered mental status. All other systems reviewed and are negative. Objective:   Vitals: Blood pressure (!) 173/90, pulse 70, temperature 97.9 °F (36.6 °C), temperature source Oral, resp. rate 18, height 6' (1.829 m), weight (!) 138 kg (305 lb), SpO2 94 %. ,     Wt Readings from Last 3 Encounters:   08/19/23 (!) 138 kg (305 lb)   08/18/23 (!) 139 kg (305 lb 12.5 oz)   07/09/22 116 kg (255 lb)        Lab Results   Component Value Date    CREATININE 1.18 08/19/2023    CREATININE 0.99 08/18/2023    CREATININE 1.20 07/09/2022         Body mass index is 41.37 kg/m². ,     Systolic (76VSH), GTD:825 , Min:119 , IJI:513     Diastolic (35UJC), WOW:40, Min:61, Max:97          Intake/Output Summary (Last 24 hours) at 8/21/2023 0845  Last data filed at 8/21/2023 0601  Gross per 24 hour   Intake 1004.78 ml   Output 850 ml   Net 154.78 ml     Weight (last 2 days)     Date/Time Weight    08/19/23 10:13:45 138 (305)        Telemetry Review: No significant arrhythmias seen on telemetry review. Physical Exam  Vitals and nursing note reviewed. Constitutional:       General: He is not in acute distress. Appearance: He is well-developed. Comments: On RA in NAD   HENT:      Head: Normocephalic and atraumatic.    Neck: Vascular: No JVD. Cardiovascular:      Rate and Rhythm: Normal rate and regular rhythm. Heart sounds: Normal heart sounds. No murmur heard. No friction rub. No gallop. Pulmonary:      Effort: Pulmonary effort is normal. No respiratory distress. Breath sounds: Normal breath sounds. No wheezing or rales. Chest:      Chest wall: No tenderness. Abdominal:      General: Bowel sounds are normal. There is no distension. Palpations: Abdomen is soft. Tenderness: There is no abdominal tenderness. Musculoskeletal:         General: No tenderness. Normal range of motion. Cervical back: Normal range of motion and neck supple. Right lower leg: No edema. Left lower leg: No edema. Skin:     General: Skin is warm and dry. Coloration: Skin is not pale. Findings: No erythema. Neurological:      Mental Status: He is alert and oriented to person, place, and time. Psychiatric:         Mood and Affect: Mood normal.         Behavior: Behavior normal.         Thought Content:  Thought content normal.         Judgment: Judgment normal.         LABORATORY RESULTS      CBC with diff:   Results from last 7 days   Lab Units 08/19/23  0436 08/18/23  1605 08/18/23  1006 08/18/23  0818   WBC Thousand/uL 14.89*  --  14.42* 13.75*   HEMOGLOBIN g/dL 15.2  --  14.9 15.1   HEMATOCRIT % 46.0  --  44.6 46.1   MCV fL 91  --  90 90   PLATELETS Thousands/uL 271 266 242 270   RBC Million/uL 5.05  --  4.97 5.12   MCH pg 30.1  --  30.0 29.5   MCHC g/dL 33.0  --  33.4 32.8   RDW % 13.0  --  12.3 12.3   MPV fL 10.2 10.2 10.1 10.0   NRBC AUTO /100 WBCs  --   --   --  0       CMP:  Results from last 7 days   Lab Units 08/19/23  0436 08/18/23  0818   POTASSIUM mmol/L 3.6 3.6   CHLORIDE mmol/L 111* 106   CO2 mmol/L 21 25   BUN mg/dL 10 9   CREATININE mg/dL 1.18 0.99   CALCIUM mg/dL 8.9 9.3   AST U/L 57*  --    ALT U/L 35  --    ALK PHOS U/L 79  --    EGFR ml/min/1.73sq m 66 82       BMP:  Results from last 7 days   Lab Units 23  0436 23  0818   POTASSIUM mmol/L 3.6 3.6   CHLORIDE mmol/L 111* 106   CO2 mmol/L 21 25   BUN mg/dL 10 9   CREATININE mg/dL 1.18 0.99   CALCIUM mg/dL 8.9 9.3       Lab Results   Component Value Date    NTBNP 75 2022    NTBNP 316 (H) 2019    NTBNP 399 (H) 2017             Results from last 7 days   Lab Units 23  0436   MAGNESIUM mg/dL 2.5                 Results from last 7 days   Lab Units 23  0818   TSH 3RD GENERATON uIU/mL 2.656             Lipid Profile:   Lab Results   Component Value Date    CHOL 119 2015    CHOL 134 2014     Lab Results   Component Value Date    HDL 30 (L) 2023    HDL 28 (L) 10/07/2020    HDL 27 (L) 2017     Lab Results   Component Value Date    LDLCALC 81 2023    LDLCALC 98 10/07/2020    LDLCALC 48 2017     Lab Results   Component Value Date    TRIG 130 2023    TRIG 107 10/07/2020    TRIG 177 (H) 2017       Cardiac testing:   Results for orders placed during the hospital encounter of 19    Echo complete with contrast if indicated    Narrative  [address] [place] [address]. [address]  [phone]    Transthoracic Echocardiogram  2D, M-mode, Doppler, and Color Doppler    Study date:  11-Aug-2019    Patient: Wilfredo Grant  MR number: [id number]  Account number: [de-identified]  : 1960  Age: 66 years  Gender: Male  Status: Inpatient  Location: Bedside  Height: 72 in  Weight: 239 lb  BP: 125/ 60 mmHg    Indications: Syncope    Diagnoses: R55. - Syncope and collapse    Sonographer:  Santosh Garcia [facility]  Primary Physician:  Tiarra Jones MD  Referring Physician:  Jerry Garcia PA-C  Group:  [facility]  Interpreting Physician:  Cinthya Arango MD    SUMMARY    LEFT VENTRICLE:  Normal left ventricular systolic function, EF 34%. Normal left ventricular cavity size.  Moderate concentric left ventricular hypertrophy. Normal left ventricular wall motion without regional wall motion abnormalities. Normal left  ventricular diastolic function. Normal left atrial pressures. LEFT ATRIUM:  Mild left atrial enlargement. RIGHT ATRIUM:  Mild right atrial enlargement. Left atrium larger than right atrium. AORTIC VALVE:  The aortic valve is tricuspid. The non coronary cusp is heavily calcified and the left coronary cusp is mild-to-moderately calcified. All 3 leaflets have good excursion. There is no aortic stenosis or regurgitation. TRICUSPID VALVE:  There was trace regurgitation. PULMONARY ARTERIES:  In adequate tricuspid regurgitation to evaluate pulmonary artery systolic pressures. HISTORY: PRIOR HISTORY: DM II, CAD with stent placement, ARLEEN, CHF, CKD, Alzheimer's disease    PROCEDURE: The procedure was performed at the bedside. This was a routine study. The transthoracic approach was used. The study included complete 2D imaging, M-mode, complete spectral Doppler, and color Doppler. Image quality was adequate. LEFT VENTRICLE: Normal left ventricular systolic function, EF 41%. Normal left ventricular cavity size. Moderate concentric left ventricular hypertrophy. Normal left ventricular wall motion without regional wall motion abnormalities. Normal left ventricular diastolic function. Normal left atrial pressures. RIGHT VENTRICLE: The size was normal. Systolic function was normal. Wall thickness was normal.    LEFT ATRIUM: Mild left atrial enlargement. RIGHT ATRIUM: Mild right atrial enlargement. Left atrium larger than right atrium. MITRAL VALVE: Valve structure was normal. There was normal leaflet separation. DOPPLER: The transmitral velocity was within the normal range. There was no evidence for stenosis. There was no regurgitation. AORTIC VALVE: The aortic valve is tricuspid. The non coronary cusp is heavily calcified and the left coronary cusp is mild-to-moderately calcified. All 3 leaflets have good excursion. There is no aortic stenosis or regurgitation. TRICUSPID VALVE: The valve structure was normal. There was normal leaflet separation. DOPPLER: The transtricuspid velocity was within the normal range. There was no evidence for stenosis. There was trace regurgitation. PULMONIC VALVE: Leaflets exhibited normal thickness, no calcification, and normal cuspal separation. DOPPLER: The transpulmonic velocity was within the normal range. There was no regurgitation. PERICARDIUM: There was no pericardial effusion. The pericardium was normal in appearance. AORTA: The root exhibited normal size. PULMONARY ARTERY: In adequate tricuspid regurgitation to evaluate pulmonary artery systolic pressures. SYSTEM MEASUREMENT TABLES    2D  %FS: 40.9 %  Ao Diam: 3 cm  EF(Teich): 71.8 %  ESV(Teich): 28.1 ml  IVSd: 1.4 cm  LA Diam: 4.2 cm  LVEF MOD A4C: 67.1 %  LVIDd: 4.6 cm  LVIDs: 2.7 cm  LVPWd: 1.5 cm  SV(Teich): 71.5 ml    [facility]    Prepared and electronically signed by    Leandro Nelson MD  Signed 12-Aug-2019 15:43:17    No results found for this or any previous visit. No results found for this or any previous visit. No valid procedures specified. No results found for this or any previous visit.         Meds/Allergies   all current active meds have been reviewed, current meds:   Current Facility-Administered Medications   Medication Dose Route Frequency   • acetaminophen (TYLENOL) tablet 975 mg  975 mg Oral Q8H PRN   • albuterol (PROVENTIL HFA,VENTOLIN HFA) inhaler 2 puff  2 puff Inhalation Q4H PRN   • albuterol inhalation solution 2.5 mg  2.5 mg Nebulization Q4H PRN   • amLODIPine (NORVASC) tablet 5 mg  5 mg Oral Daily   • aspirin chewable tablet 81 mg  81 mg Oral Daily   • atorvastatin (LIPITOR) tablet 80 mg  80 mg Oral After Dinner   • baclofen tablet 10 mg  10 mg Oral TID   • carvedilol (COREG) tablet 12.5 mg  12.5 mg Oral BID With Meals   • clopidogrel (PLAVIX) tablet 75 mg  75 mg Oral Daily   • cyanocobalamin (VITAMIN B-12) tablet 1,000 mcg  1,000 mcg Oral Daily   • folic acid (FOLVITE) tablet 1 mg  1 mg Oral Daily   • gabapentin (NEURONTIN) capsule 600 mg  600 mg Oral TID   • heparin (porcine) 25,000 units in 0.45% NaCl 250 mL infusion (premix)  3-27 Units/kg/hr (Order-Specific) Intravenous Titrated   • heparin (porcine) injection 2,000 Units  2,000 Units Intravenous Q6H PRN   • heparin (porcine) injection 4,000 Units  4,000 Units Intravenous Q6H PRN   • isosorbide mononitrate (IMDUR) 24 hr tablet 60 mg  60 mg Oral Daily   • lidocaine (LIDODERM) 5 % patch 2 patch  2 patch Topical Daily   • menthol-methyl salicylate (BENGAY) 72-95 % cream   Apply externally 4x Daily PRN   • morphine injection 4 mg  4 mg Intravenous Q4H PRN   • ondansetron (ZOFRAN) injection 4 mg  4 mg Intravenous Q6H PRN   • oxyCODONE (ROXICODONE) IR tablet 5 mg  5 mg Oral Q4H PRN   • pantoprazole (PROTONIX) EC tablet 40 mg  40 mg Oral Early Morning   • polyethylene glycol (MIRALAX) packet 17 g  17 g Oral Daily   • ranolazine (RANEXA) 12 hr tablet 1,000 mg  1,000 mg Oral Q12H JOY   • senna-docusate sodium (SENOKOT S) 8.6-50 mg per tablet 1 tablet  1 tablet Oral BID   • tamsulosin (FLOMAX) capsule 0.4 mg  0.4 mg Oral Daily With Dinner    and PTA meds:   Prior to Admission Medications   Prescriptions Last Dose Informant Patient Reported? Taking?    ARIPiprazole (ABILIFY) 5 mg tablet   Yes No   Sig: Take 10 mg by mouth daily    CALCIUM PO  Self Yes No   Sig: Take 1 tablet by mouth daily   Cholecalciferol (VITAMIN D3) 5000 units CAPS  Self No No   Sig: Take 1 capsule (5,000 Units total) by mouth daily   Patient not taking: Reported on 5/12/2021   Cyanocobalamin (VITAMIN B-12 PO)  Self Yes No   Sig: Take 1 tablet by mouth daily   Morphine Sulfate Microinfusion (MORPHINE SULF MICROINFUSION PF IJ)  Self Yes No   Sig: Inject 14 mg as directed daily Via infusion pump POTASSIUM PO  Self Yes No   Sig: Take 40 mEq by mouth 2 (two) times a day   Pediatric Multivit-Minerals-C (Polyvitamin/Iron) CHEW   Yes No   Sig: Chew 1 tablet daily   ULTICARE SHORT PEN NEEDLES 31G X 8 MM MISC  Self Yes No   Si INJECTIONS PER DAY DX  E11.8   albuterol (ACCUNEB) 1.25 MG/3ML nebulizer solution  Self Yes No   Sig: Take 1 ampule by nebulization every 6 (six) hours as needed for wheezing   atorvastatin (LIPITOR) 40 mg tablet  Self Yes No   Sig: Take 40 mg by mouth daily. baclofen 10 mg tablet  Self Yes No   Sig: Take 10 mg by mouth 3 (three) times a day   carvedilol (COREG) 12.5 mg tablet   Yes No   Sig: TAKE 2 TABLETS BY MOUTH 2 TIMES A DAY WITH MEALS. clopidogrel (PLAVIX) 75 mg tablet   Yes No   Sig: Take 75 mg by mouth daily   ergocalciferol (VITAMIN D2) 50,000 units  Self Yes No   Sig: Take 50,000 Units by mouth once a week   fludrocortisone (FLORINEF) 0.1 mg tablet  Self No No   Sig: Take 1 tablet (0.1 mg total) by mouth daily   folic acid (FOLVITE) 1 mg tablet  Self Yes No   Sig: Take 1 mg by mouth daily    gabapentin (NEURONTIN) 300 mg capsule  Self No No   Sig: TAKE 1 CAPSULE (300 MG TOTAL) BY MOUTH AS NEEDED (MIGRAINE)   gabapentin (NEURONTIN) 600 MG tablet  Self Yes No   Sig: Take 600 mg by mouth 3 (three) times a day   hydrOXYzine HCL (ATARAX) 25 mg tablet  Self Yes No   Sig: Take 75 mg by mouth 2 (two) times a day as needed for anxiety    hydrocortisone 1 % lotion   Yes No   Sig: APPLY TOPICALLY 2 (TWO) TIMES A DAY INDICATIONS  USING ON FEET. memantine (NAMENDA) 10 mg tablet   Yes No   Sig: TAKE 1 TABLET (10 MG TOTAL) BY MOUTH 2 (TWO) TIMES A DAY. methocarbamol (ROBAXIN) 750 mg tablet   No No   Sig: Take 1 tablet (750 mg total) by mouth every 6 (six) hours as needed for muscle spasms   nitroglycerin (NITROSTAT) 0.4 mg SL tablet  Self Yes No   Sig: Place 0.4 mg under the tongue every 5 (five) minutes as needed for chest pain (pt has not  used recently).      nystatin (MYCOSTATIN) cream  Self Yes No   Sig: Apply 1 application topically 2 (two) times a day   omeprazole (PriLOSEC) 20 mg delayed release capsule   Yes No   Sig: omeprazole 20 mg capsule,delayed release   omeprazole (PriLOSEC) 40 MG capsule  Self Yes No   Sig: Take 40 mg by mouth daily   ondansetron (ZOFRAN) 4 mg tablet   No No   Sig: Take 1 tablet (4 mg total) by mouth every 8 (eight) hours as needed for nausea or vomiting   oxyCODONE-acetaminophen (PERCOCET) 5-325 mg per tablet   No No   Sig: Take 1 tablet by mouth every 4 (four) hours as needed for severe painMax Daily Amount: 6 tablets   Patient not taking: Reported on 5/12/2021   prochlorperazine (COMPAZINE) 10 mg tablet  Self No No   Sig: Take 1 tablet (10 mg total) by mouth every 6 (six) hours as needed for nausea or vomiting (migraine)   sertraline (ZOLOFT) 100 mg tablet  Self No No   Sig: Take 1.5 tablets (150 mg total) by mouth daily   Patient not taking: Reported on 8/11/2021   sucralfate (CARAFATE) 1 g/10 mL suspension   No No   Sig: Take 10 mL (1 g total) by mouth 4 (four) times a day   tamsulosin (FLOMAX) 0.4 mg  Self Yes No   Sig: Take 0.4 mg by mouth daily with dinner.    traZODone (DESYREL) 100 mg tablet  Self No No   Sig: Take 2 tablets (200 mg total) by mouth daily at bedtime as needed for sleep   Patient not taking: Reported on 8/11/2021      Facility-Administered Medications: None     Medications Prior to Admission   Medication   • albuterol (ACCUNEB) 1.25 MG/3ML nebulizer solution   • ARIPiprazole (ABILIFY) 5 mg tablet   • atorvastatin (LIPITOR) 40 mg tablet   • baclofen 10 mg tablet   • CALCIUM PO   • carvedilol (COREG) 12.5 mg tablet   • Cholecalciferol (VITAMIN D3) 5000 units CAPS   • clopidogrel (PLAVIX) 75 mg tablet   • Cyanocobalamin (VITAMIN B-12 PO)   • ergocalciferol (VITAMIN D2) 50,000 units   • fludrocortisone (FLORINEF) 0.1 mg tablet   • folic acid (FOLVITE) 1 mg tablet   • gabapentin (NEURONTIN) 300 mg capsule   • gabapentin (NEURONTIN) 600 MG tablet   • hydrocortisone 1 % lotion   • hydrOXYzine HCL (ATARAX) 25 mg tablet   • memantine (NAMENDA) 10 mg tablet   • methocarbamol (ROBAXIN) 750 mg tablet   • Morphine Sulfate Microinfusion (MORPHINE SULF MICROINFUSION PF IJ)   • nitroglycerin (NITROSTAT) 0.4 mg SL tablet   • nystatin (MYCOSTATIN) cream   • omeprazole (PriLOSEC) 20 mg delayed release capsule   • omeprazole (PriLOSEC) 40 MG capsule   • ondansetron (ZOFRAN) 4 mg tablet   • oxyCODONE-acetaminophen (PERCOCET) 5-325 mg per tablet   • Pediatric Multivit-Minerals-C (Polyvitamin/Iron) CHEW   • POTASSIUM PO   • prochlorperazine (COMPAZINE) 10 mg tablet   • sertraline (ZOLOFT) 100 mg tablet   • sucralfate (CARAFATE) 1 g/10 mL suspension   • tamsulosin (FLOMAX) 0.4 mg   • traZODone (DESYREL) 100 mg tablet   • ULTICARE SHORT PEN NEEDLES 31G X 8 MM MISC       heparin (porcine), 3-27 Units/kg/hr (Order-Specific), Last Rate: 27.1 Units/kg/hr (08/21/23 5989)        Counseling / Coordination of Care  Total floor / unit time spent today 20 minutes. Greater than 50% of total time was spent with the patient and / or family counseling and / or coordination of care. ** Please Note: Dragon 360 Dictation voice to text software may have been used in the creation of this document.  **

## 2023-08-23 ENCOUNTER — HOSPITAL ENCOUNTER (INPATIENT)
Facility: HOSPITAL | Age: 63
LOS: 1 days | DRG: 281 | End: 2023-08-23
Attending: EMERGENCY MEDICINE | Admitting: STUDENT IN AN ORGANIZED HEALTH CARE EDUCATION/TRAINING PROGRAM
Payer: MEDICARE

## 2023-08-23 ENCOUNTER — APPOINTMENT (EMERGENCY)
Dept: RADIOLOGY | Facility: HOSPITAL | Age: 63
DRG: 281 | End: 2023-08-23
Payer: MEDICARE

## 2023-08-23 ENCOUNTER — TELEPHONE (OUTPATIENT)
Dept: CARDIOLOGY CLINIC | Facility: CLINIC | Age: 63
End: 2023-08-23

## 2023-08-23 ENCOUNTER — HOSPITAL ENCOUNTER (INPATIENT)
Facility: HOSPITAL | Age: 63
LOS: 8 days | Discharge: HOME | DRG: 281 | End: 2023-08-31
Attending: INTERNAL MEDICINE | Admitting: INTERNAL MEDICINE
Payer: MEDICARE

## 2023-08-23 VITALS
HEIGHT: 72 IN | RESPIRATION RATE: 20 BRPM | DIASTOLIC BLOOD PRESSURE: 64 MMHG | HEART RATE: 80 BPM | TEMPERATURE: 96.9 F | BODY MASS INDEX: 41.21 KG/M2 | OXYGEN SATURATION: 96 % | WEIGHT: 304.24 LBS | SYSTOLIC BLOOD PRESSURE: 127 MMHG

## 2023-08-23 DIAGNOSIS — R07.9 CHEST PAIN: Primary | ICD-10-CM

## 2023-08-23 DIAGNOSIS — Z97.8 PRESENCE OF INTRATHECAL PUMP: ICD-10-CM

## 2023-08-23 LAB
2HR DELTA HS TROPONIN: -3 NG/L
4HR DELTA HS TROPONIN: 36 NG/L
ALBUMIN SERPL BCP-MCNC: 3.8 G/DL (ref 3.5–5)
ALP SERPL-CCNC: 63 U/L (ref 34–104)
ALT SERPL W P-5'-P-CCNC: 27 U/L (ref 7–52)
ANION GAP SERPL CALCULATED.3IONS-SCNC: 8 MMOL/L
AST SERPL W P-5'-P-CCNC: 38 U/L (ref 13–39)
ATRIAL RATE: 69 BPM
ATRIAL RATE: 84 BPM
BASOPHILS # BLD AUTO: 0.05 THOUSANDS/ÂΜL (ref 0–0.1)
BASOPHILS NFR BLD AUTO: 1 % (ref 0–1)
BILIRUB SERPL-MCNC: 0.6 MG/DL (ref 0.2–1)
BNP SERPL-MCNC: 53 PG/ML (ref 0–100)
BUN SERPL-MCNC: 10 MG/DL (ref 5–25)
CALCIUM SERPL-MCNC: 8.6 MG/DL (ref 8.4–10.2)
CARDIAC TROPONIN I PNL SERPL HS: 494 NG/L
CARDIAC TROPONIN I PNL SERPL HS: 497 NG/L
CARDIAC TROPONIN I PNL SERPL HS: 533 NG/L
CHLORIDE SERPL-SCNC: 110 MMOL/L (ref 96–108)
CO2 SERPL-SCNC: 20 MMOL/L (ref 21–32)
CREAT SERPL-MCNC: 1.18 MG/DL (ref 0.6–1.3)
CRP SERPL QL: 16.3 MG/L
EOSINOPHIL # BLD AUTO: 0.44 THOUSAND/ÂΜL (ref 0–0.61)
EOSINOPHIL NFR BLD AUTO: 4 % (ref 0–6)
ERYTHROCYTE [DISTWIDTH] IN BLOOD BY AUTOMATED COUNT: 13.1 % (ref 11.6–15.1)
ERYTHROCYTE [SEDIMENTATION RATE] IN BLOOD: 38 MM/HOUR (ref 0–19)
GFR SERPL CREATININE-BSD FRML MDRD: 66 ML/MIN/1.73SQ M
GLUCOSE SERPL-MCNC: 109 MG/DL (ref 65–140)
HCT VFR BLD AUTO: 46.9 % (ref 36.5–49.3)
HGB BLD-MCNC: 15 G/DL (ref 12–17)
IMM GRANULOCYTES # BLD AUTO: 0.1 THOUSAND/UL (ref 0–0.2)
IMM GRANULOCYTES NFR BLD AUTO: 1 % (ref 0–2)
LYMPHOCYTES # BLD AUTO: 2.05 THOUSANDS/ÂΜL (ref 0.6–4.47)
LYMPHOCYTES NFR BLD AUTO: 19 % (ref 14–44)
MCH RBC QN AUTO: 29.8 PG (ref 26.8–34.3)
MCHC RBC AUTO-ENTMCNC: 32 G/DL (ref 31.4–37.4)
MCV RBC AUTO: 93 FL (ref 82–98)
MONOCYTES # BLD AUTO: 0.73 THOUSAND/ÂΜL (ref 0.17–1.22)
MONOCYTES NFR BLD AUTO: 7 % (ref 4–12)
NEUTROPHILS # BLD AUTO: 7.46 THOUSANDS/ÂΜL (ref 1.85–7.62)
NEUTS SEG NFR BLD AUTO: 68 % (ref 43–75)
NRBC BLD AUTO-RTO: 0 /100 WBCS
P AXIS: 2 DEGREES
P AXIS: 4 DEGREES
PLATELET # BLD AUTO: 235 THOUSANDS/UL (ref 149–390)
PMV BLD AUTO: 10 FL (ref 8.9–12.7)
POTASSIUM SERPL-SCNC: 4.4 MMOL/L (ref 3.5–5.3)
PR INTERVAL: 176 MS
PR INTERVAL: 188 MS
PROT SERPL-MCNC: 7 G/DL (ref 6.4–8.4)
QRS AXIS: -67 DEGREES
QRS AXIS: -73 DEGREES
QRSD INTERVAL: 134 MS
QRSD INTERVAL: 138 MS
QT INTERVAL: 412 MS
QT INTERVAL: 458 MS
QTC INTERVAL: 486 MS
QTC INTERVAL: 490 MS
RBC # BLD AUTO: 5.03 MILLION/UL (ref 3.88–5.62)
SODIUM SERPL-SCNC: 138 MMOL/L (ref 135–147)
T WAVE AXIS: 49 DEGREES
T WAVE AXIS: 73 DEGREES
VENTRICULAR RATE: 69 BPM
VENTRICULAR RATE: 84 BPM
WBC # BLD AUTO: 10.83 THOUSAND/UL (ref 4.31–10.16)

## 2023-08-23 PROCEDURE — 93010 ELECTROCARDIOGRAM REPORT: CPT | Performed by: STUDENT IN AN ORGANIZED HEALTH CARE EDUCATION/TRAINING PROGRAM

## 2023-08-23 PROCEDURE — 36415 COLL VENOUS BLD VENIPUNCTURE: CPT

## 2023-08-23 PROCEDURE — 93005 ELECTROCARDIOGRAM TRACING: CPT

## 2023-08-23 PROCEDURE — 85652 RBC SED RATE AUTOMATED: CPT | Performed by: STUDENT IN AN ORGANIZED HEALTH CARE EDUCATION/TRAINING PROGRAM

## 2023-08-23 PROCEDURE — 99285 EMERGENCY DEPT VISIT HI MDM: CPT

## 2023-08-23 PROCEDURE — 99214 OFFICE O/P EST MOD 30 MIN: CPT | Performed by: INTERNAL MEDICINE

## 2023-08-23 PROCEDURE — 80053 COMPREHEN METABOLIC PANEL: CPT | Performed by: EMERGENCY MEDICINE

## 2023-08-23 PROCEDURE — 99285 EMERGENCY DEPT VISIT HI MDM: CPT | Performed by: EMERGENCY MEDICINE

## 2023-08-23 PROCEDURE — 85025 COMPLETE CBC W/AUTO DIFF WBC: CPT | Performed by: EMERGENCY MEDICINE

## 2023-08-23 PROCEDURE — 93010 ELECTROCARDIOGRAM REPORT: CPT | Performed by: INTERNAL MEDICINE

## 2023-08-23 PROCEDURE — 84484 ASSAY OF TROPONIN QUANT: CPT | Performed by: EMERGENCY MEDICINE

## 2023-08-23 PROCEDURE — 99223 1ST HOSP IP/OBS HIGH 75: CPT | Performed by: STUDENT IN AN ORGANIZED HEALTH CARE EDUCATION/TRAINING PROGRAM

## 2023-08-23 PROCEDURE — 99204 OFFICE O/P NEW MOD 45 MIN: CPT | Performed by: INTERNAL MEDICINE

## 2023-08-23 PROCEDURE — 99223 1ST HOSP IP/OBS HIGH 75: CPT | Performed by: INTERNAL MEDICINE

## 2023-08-23 PROCEDURE — 86140 C-REACTIVE PROTEIN: CPT | Performed by: STUDENT IN AN ORGANIZED HEALTH CARE EDUCATION/TRAINING PROGRAM

## 2023-08-23 PROCEDURE — 71045 X-RAY EXAM CHEST 1 VIEW: CPT

## 2023-08-23 PROCEDURE — 82948 REAGENT STRIP/BLOOD GLUCOSE: CPT

## 2023-08-23 PROCEDURE — 83880 ASSAY OF NATRIURETIC PEPTIDE: CPT | Performed by: EMERGENCY MEDICINE

## 2023-08-23 PROCEDURE — 96374 THER/PROPH/DIAG INJ IV PUSH: CPT

## 2023-08-23 RX ORDER — MORPHINE SULFATE 15 MG/1
30 TABLET ORAL EVERY 8 HOURS
Status: DISCONTINUED | OUTPATIENT
Start: 2023-08-23 | End: 2023-08-23 | Stop reason: HOSPADM

## 2023-08-23 RX ORDER — PANTOPRAZOLE SODIUM 40 MG/1
40 TABLET, DELAYED RELEASE ORAL
Status: DISCONTINUED | OUTPATIENT
Start: 2023-08-24 | End: 2023-08-23 | Stop reason: HOSPADM

## 2023-08-23 RX ORDER — GABAPENTIN 300 MG/1
600 CAPSULE ORAL 3 TIMES DAILY
Status: DISCONTINUED | OUTPATIENT
Start: 2023-08-23 | End: 2023-08-23 | Stop reason: HOSPADM

## 2023-08-23 RX ORDER — BACLOFEN 10 MG/1
10 TABLET ORAL 3 TIMES DAILY
Status: DISCONTINUED | OUTPATIENT
Start: 2023-08-23 | End: 2023-08-30

## 2023-08-23 RX ORDER — FOLIC ACID 1 MG/1
1 TABLET ORAL DAILY
Status: DISCONTINUED | OUTPATIENT
Start: 2023-08-24 | End: 2023-08-30

## 2023-08-23 RX ORDER — GABAPENTIN 300 MG/1
600 CAPSULE ORAL 3 TIMES DAILY
Status: DISCONTINUED | OUTPATIENT
Start: 2023-08-23 | End: 2023-08-23

## 2023-08-23 RX ORDER — PANTOPRAZOLE SODIUM 40 MG/1
40 TABLET, DELAYED RELEASE ORAL
Status: DISCONTINUED | OUTPATIENT
Start: 2023-08-24 | End: 2023-08-25

## 2023-08-23 RX ORDER — ONDANSETRON 2 MG/ML
4 INJECTION INTRAMUSCULAR; INTRAVENOUS EVERY 6 HOURS PRN
Status: DISCONTINUED | OUTPATIENT
Start: 2023-08-23 | End: 2023-08-23 | Stop reason: HOSPADM

## 2023-08-23 RX ORDER — HYDROXYZINE HYDROCHLORIDE 25 MG/1
75 TABLET, FILM COATED ORAL 2 TIMES DAILY PRN
Status: DISCONTINUED | OUTPATIENT
Start: 2023-08-23 | End: 2023-08-23 | Stop reason: HOSPADM

## 2023-08-23 RX ORDER — LABETALOL HYDROCHLORIDE 5 MG/ML
10 INJECTION, SOLUTION INTRAVENOUS EVERY 6 HOURS PRN
Status: DISCONTINUED | OUTPATIENT
Start: 2023-08-23 | End: 2023-08-23 | Stop reason: HOSPADM

## 2023-08-23 RX ORDER — ATORVASTATIN CALCIUM 80 MG/1
80 TABLET, FILM COATED ORAL
Status: DISCONTINUED | OUTPATIENT
Start: 2023-08-24 | End: 2023-08-31 | Stop reason: HOSPADM

## 2023-08-23 RX ORDER — ONDANSETRON 2 MG/ML
4 INJECTION INTRAMUSCULAR; INTRAVENOUS EVERY 6 HOURS PRN
Status: DISCONTINUED | OUTPATIENT
Start: 2023-08-23 | End: 2023-08-31 | Stop reason: HOSPADM

## 2023-08-23 RX ORDER — ENOXAPARIN SODIUM 100 MG/ML
40 INJECTION SUBCUTANEOUS DAILY
Status: DISCONTINUED | OUTPATIENT
Start: 2023-08-23 | End: 2023-08-23 | Stop reason: HOSPADM

## 2023-08-23 RX ORDER — ENOXAPARIN SODIUM 100 MG/ML
40 INJECTION SUBCUTANEOUS DAILY
Status: DISCONTINUED | OUTPATIENT
Start: 2023-08-24 | End: 2023-08-30

## 2023-08-23 RX ORDER — CLOPIDOGREL BISULFATE 75 MG/1
75 TABLET ORAL DAILY
Status: DISCONTINUED | OUTPATIENT
Start: 2023-08-24 | End: 2023-08-23 | Stop reason: HOSPADM

## 2023-08-23 RX ORDER — GABAPENTIN 300 MG/1
600 CAPSULE ORAL 3 TIMES DAILY
Status: DISCONTINUED | OUTPATIENT
Start: 2023-08-23 | End: 2023-08-30

## 2023-08-23 RX ORDER — MORPHINE SULFATE 15 MG/1
30 TABLET ORAL EVERY 8 HOURS
Status: DISCONTINUED | OUTPATIENT
Start: 2023-08-23 | End: 2023-08-24

## 2023-08-23 RX ORDER — AMLODIPINE BESYLATE 5 MG/1
5 TABLET ORAL DAILY
Status: DISCONTINUED | OUTPATIENT
Start: 2023-08-24 | End: 2023-08-30

## 2023-08-23 RX ORDER — TAMSULOSIN HYDROCHLORIDE 0.4 MG/1
0.4 CAPSULE ORAL
Status: DISCONTINUED | OUTPATIENT
Start: 2023-08-23 | End: 2023-08-23 | Stop reason: HOSPADM

## 2023-08-23 RX ORDER — ISOSORBIDE MONONITRATE 60 MG/1
60 TABLET, EXTENDED RELEASE ORAL DAILY
Status: DISCONTINUED | OUTPATIENT
Start: 2023-08-23 | End: 2023-08-23 | Stop reason: HOSPADM

## 2023-08-23 RX ORDER — TAMSULOSIN HYDROCHLORIDE 0.4 MG/1
0.4 CAPSULE ORAL
Status: DISCONTINUED | OUTPATIENT
Start: 2023-08-24 | End: 2023-08-31 | Stop reason: HOSPADM

## 2023-08-23 RX ORDER — RANOLAZINE 500 MG/1
1000 TABLET, EXTENDED RELEASE ORAL EVERY 12 HOURS SCHEDULED
Status: DISCONTINUED | OUTPATIENT
Start: 2023-08-23 | End: 2023-08-30

## 2023-08-23 RX ORDER — ASPIRIN 81 MG/1
81 TABLET, CHEWABLE ORAL DAILY
Status: DISCONTINUED | OUTPATIENT
Start: 2023-08-24 | End: 2023-08-23 | Stop reason: HOSPADM

## 2023-08-23 RX ORDER — ONDANSETRON 2 MG/ML
4 INJECTION INTRAMUSCULAR; INTRAVENOUS ONCE
Status: COMPLETED | OUTPATIENT
Start: 2023-08-23 | End: 2023-08-23

## 2023-08-23 RX ORDER — FOLIC ACID 1 MG/1
1 TABLET ORAL DAILY
Status: DISCONTINUED | OUTPATIENT
Start: 2023-08-24 | End: 2023-08-23 | Stop reason: HOSPADM

## 2023-08-23 RX ORDER — ISOSORBIDE MONONITRATE 60 MG/1
60 TABLET, EXTENDED RELEASE ORAL DAILY
Status: DISCONTINUED | OUTPATIENT
Start: 2023-08-24 | End: 2023-08-30

## 2023-08-23 RX ORDER — CARVEDILOL 12.5 MG/1
12.5 TABLET ORAL 2 TIMES DAILY WITH MEALS
Status: DISCONTINUED | OUTPATIENT
Start: 2023-08-23 | End: 2023-08-23 | Stop reason: HOSPADM

## 2023-08-23 RX ORDER — BACLOFEN 10 MG/1
10 TABLET ORAL 3 TIMES DAILY
Status: DISCONTINUED | OUTPATIENT
Start: 2023-08-23 | End: 2023-08-23 | Stop reason: HOSPADM

## 2023-08-23 RX ORDER — ACETAMINOPHEN 325 MG/1
650 TABLET ORAL EVERY 6 HOURS PRN
Status: DISCONTINUED | OUTPATIENT
Start: 2023-08-23 | End: 2023-08-24

## 2023-08-23 RX ORDER — ARIPIPRAZOLE 10 MG/1
10 TABLET ORAL DAILY
Status: DISCONTINUED | OUTPATIENT
Start: 2023-08-24 | End: 2023-08-23 | Stop reason: HOSPADM

## 2023-08-23 RX ORDER — NITROGLYCERIN 0.4 MG/1
0.4 TABLET SUBLINGUAL
Status: DISCONTINUED | OUTPATIENT
Start: 2023-08-23 | End: 2023-08-31 | Stop reason: HOSPADM

## 2023-08-23 RX ORDER — CARVEDILOL 12.5 MG/1
12.5 TABLET ORAL 2 TIMES DAILY WITH MEALS
Status: DISCONTINUED | OUTPATIENT
Start: 2023-08-24 | End: 2023-08-30

## 2023-08-23 RX ORDER — ISOSORBIDE MONONITRATE 60 MG/1
60 TABLET, EXTENDED RELEASE ORAL DAILY
Status: DISCONTINUED | OUTPATIENT
Start: 2023-08-24 | End: 2023-08-23

## 2023-08-23 RX ORDER — HYDRALAZINE HYDROCHLORIDE 20 MG/ML
10 INJECTION INTRAMUSCULAR; INTRAVENOUS EVERY 4 HOURS PRN
Status: DISCONTINUED | OUTPATIENT
Start: 2023-08-23 | End: 2023-08-23

## 2023-08-23 RX ORDER — ACETAMINOPHEN 325 MG/1
650 TABLET ORAL EVERY 6 HOURS PRN
Status: DISCONTINUED | OUTPATIENT
Start: 2023-08-23 | End: 2023-08-23 | Stop reason: HOSPADM

## 2023-08-23 RX ORDER — ATORVASTATIN CALCIUM 80 MG/1
80 TABLET, FILM COATED ORAL
Status: DISCONTINUED | OUTPATIENT
Start: 2023-08-23 | End: 2023-08-23 | Stop reason: HOSPADM

## 2023-08-23 RX ORDER — ARIPIPRAZOLE 10 MG/1
10 TABLET ORAL DAILY
Status: DISCONTINUED | OUTPATIENT
Start: 2023-08-24 | End: 2023-08-28

## 2023-08-23 RX ORDER — AMLODIPINE BESYLATE 5 MG/1
5 TABLET ORAL DAILY
Status: DISCONTINUED | OUTPATIENT
Start: 2023-08-24 | End: 2023-08-23 | Stop reason: HOSPADM

## 2023-08-23 RX ORDER — ASPIRIN 81 MG/1
81 TABLET, CHEWABLE ORAL DAILY
Status: DISCONTINUED | OUTPATIENT
Start: 2023-08-24 | End: 2023-08-28

## 2023-08-23 RX ORDER — METHOCARBAMOL 750 MG/1
750 TABLET, FILM COATED ORAL EVERY 6 HOURS PRN
Status: DISCONTINUED | OUTPATIENT
Start: 2023-08-23 | End: 2023-08-31 | Stop reason: HOSPADM

## 2023-08-23 RX ORDER — RANOLAZINE 500 MG/1
1000 TABLET, EXTENDED RELEASE ORAL EVERY 12 HOURS SCHEDULED
Status: DISCONTINUED | OUTPATIENT
Start: 2023-08-23 | End: 2023-08-23 | Stop reason: HOSPADM

## 2023-08-23 RX ORDER — INSULIN LISPRO 100 [IU]/ML
1-5 INJECTION, SOLUTION INTRAVENOUS; SUBCUTANEOUS
Status: DISCONTINUED | OUTPATIENT
Start: 2023-08-24 | End: 2023-08-31 | Stop reason: HOSPADM

## 2023-08-23 RX ORDER — METHOCARBAMOL 750 MG/1
750 TABLET, FILM COATED ORAL EVERY 6 HOURS PRN
Status: DISCONTINUED | OUTPATIENT
Start: 2023-08-23 | End: 2023-08-23 | Stop reason: HOSPADM

## 2023-08-23 RX ORDER — MORPHINE SULFATE 30 MG/1
30 TABLET ORAL EVERY 8 HOURS
COMMUNITY
End: 2023-09-08

## 2023-08-23 RX ORDER — CLOPIDOGREL BISULFATE 75 MG/1
75 TABLET ORAL DAILY
Status: DISCONTINUED | OUTPATIENT
Start: 2023-08-24 | End: 2023-08-25

## 2023-08-23 RX ADMIN — MORPHINE SULFATE 30 MG: 15 TABLET ORAL at 19:25

## 2023-08-23 RX ADMIN — ATORVASTATIN CALCIUM 80 MG: 80 TABLET, FILM COATED ORAL at 17:41

## 2023-08-23 RX ADMIN — ISOSORBIDE MONONITRATE 60 MG: 60 TABLET, EXTENDED RELEASE ORAL at 17:58

## 2023-08-23 RX ADMIN — ACETAMINOPHEN 650 MG: 325 TABLET, FILM COATED ORAL at 23:54

## 2023-08-23 RX ADMIN — GABAPENTIN 600 MG: 300 CAPSULE ORAL at 23:53

## 2023-08-23 RX ADMIN — GABAPENTIN 600 MG: 300 CAPSULE ORAL at 17:58

## 2023-08-23 RX ADMIN — BACLOFEN 10 MG: 10 TABLET ORAL at 20:51

## 2023-08-23 RX ADMIN — RANOLAZINE 1000 MG: 500 TABLET, EXTENDED RELEASE ORAL at 20:50

## 2023-08-23 RX ADMIN — ENOXAPARIN SODIUM 40 MG: 40 INJECTION SUBCUTANEOUS at 16:11

## 2023-08-23 RX ADMIN — ONDANSETRON 4 MG: 2 INJECTION INTRAMUSCULAR; INTRAVENOUS at 13:29

## 2023-08-23 RX ADMIN — TAMSULOSIN HYDROCHLORIDE 0.4 MG: 0.4 CAPSULE ORAL at 16:11

## 2023-08-23 RX ADMIN — CARVEDILOL 12.5 MG: 12.5 TABLET, FILM COATED ORAL at 16:10

## 2023-08-23 RX ADMIN — NITROGLYCERIN 1 INCH: 20 OINTMENT TOPICAL at 13:58

## 2023-08-23 NOTE — ED PROVIDER NOTES
History  Chief Complaint   Patient presents with   • Chest Pain     Pt reports CP since d/c from [facility]. (+)nausea     61y M w/ HTN, HLP, CAD, just discharged [facility] on 8/21 after being admitted for 3 days for MI. On 8/18, presented to [facility] with CP, concern for anterior STEMI hence sent to [facility] -> underwent urgent LHC which found: Dist LM lesion is 50% stenosed, Ost LAD lesion is 40% stenosed, Mid LAD lesion is 40% stenosed, Mid Cx lesion is 20% stenosed. Medical management w/ ASA, plavix, Ranexa 1000mg bid, Imdur 60mg daily. Pt reports having chest pressure since being discharged - stated he told them his chest pain was back but "they discharged me anyway". Pt reports constant chest pressure since Monday - across the whole chest, worse w/ "stress" and activity. Reports assoc intermittent diaphoresis, nausea, LH, palpitations, and dyspnea. States he had to rest multiple times walking in from the parking lot 2/2 to his pain and dyspnea. Over the last two days has taken almost 20 SL ntg for pain w/o improvement. Pt states he thinks his symptoms are due to a change in his pain pump. Last Wednesday (2d before his MI), his pain pump was changed from morphine to fentanyl. Since the change, pt having chest pain. [facility] and states approx 1h pta had the fentanyl changed back to his morphine. Pt denies f/c, no cough/congestion, no sig LE pain or swelling. History provided by:  Patient, medical records and spouse   used: No    Chest Pain      Prior to Admission Medications   Prescriptions Last Dose Informant Patient Reported? Taking?    ARIPiprazole (ABILIFY) 5 mg tablet   Yes No   Sig: Take 10 mg by mouth daily    CALCIUM PO  Self Yes No   Sig: Take 1 tablet by mouth daily   Cyanocobalamin (VITAMIN B-12 PO)  Self Yes No   Sig: Take 1 tablet by mouth daily   Morphine Sulfate Microinfusion (MORPHINE SULF MICROINFUSION PF IJ)  Self Yes No   Sig: Inject 14 mg as directed daily Via infusion pump   POTASSIUM PO  Self Yes No   Sig: Take 40 mEq by mouth 2 (two) times a day   Pediatric Multivit-Minerals-C (Polyvitamin/Iron) CHEW   Yes No   Sig: Chew 1 tablet daily   ULTICARE SHORT PEN NEEDLES 31G X 8 MM MISC  Self Yes No   Si INJECTIONS PER DAY DX  E11.8   albuterol (ACCUNEB) 1.25 MG/3ML nebulizer solution  Self Yes No   Sig: Take 1 ampule by nebulization every 6 (six) hours as needed for wheezing   amLODIPine (NORVASC) 5 mg tablet   No No   Sig: Take 1 tablet (5 mg total) by mouth daily Do not start before 2023. aspirin 81 mg chewable tablet   No No   Sig: Chew 1 tablet (81 mg total) daily Do not start before 2023. atorvastatin (LIPITOR) 80 mg tablet   No No   Sig: Take 1 tablet (80 mg total) by mouth daily after dinner   baclofen 10 mg tablet  Self Yes No   Sig: Take 10 mg by mouth 3 (three) times a day   carvedilol (COREG) 12.5 mg tablet   No No   Sig: Take 1 tablet (12.5 mg total) by mouth 2 (two) times a day with meals   clopidogrel (PLAVIX) 75 mg tablet   Yes No   Sig: Take 75 mg by mouth daily   ergocalciferol (VITAMIN D2) 50,000 units  Self Yes No   Sig: Take 50,000 Units by mouth once a week   folic acid (FOLVITE) 1 mg tablet  Self Yes No   Sig: Take 1 mg by mouth daily    gabapentin (NEURONTIN) 300 mg capsule  Self No No   Sig: TAKE 1 CAPSULE (300 MG TOTAL) BY MOUTH AS NEEDED (MIGRAINE)   gabapentin (NEURONTIN) 600 MG tablet  Self Yes No   Sig: Take 600 mg by mouth 3 (three) times a day   hydrOXYzine HCL (ATARAX) 25 mg tablet  Self Yes No   Sig: Take 75 mg by mouth 2 (two) times a day as needed for anxiety    hydrocortisone 1 % lotion   Yes No   Sig: APPLY TOPICALLY 2 (TWO) TIMES A DAY INDICATIONS  USING ON FEET.   isosorbide mononitrate (IMDUR) 60 mg 24 hr tablet   No No   Sig: Take 1 tablet (60 mg total) by mouth daily Do not start before 2023.    memantine (NAMENDA) 10 mg tablet   Yes No   Sig: TAKE 1 TABLET (10 MG TOTAL) BY MOUTH 2 (TWO) TIMES A DAY. methocarbamol (ROBAXIN) 750 mg tablet   No No   Sig: Take 1 tablet (750 mg total) by mouth every 6 (six) hours as needed for muscle spasms   nitroglycerin (NITROSTAT) 0.4 mg SL tablet 8/23/2023 Self Yes Yes   Sig: Place 0.4 mg under the tongue every 5 (five) minutes as needed for chest pain (pt has not  used recently). nystatin (MYCOSTATIN) cream  Self Yes No   Sig: Apply 1 application topically 2 (two) times a day   omeprazole (PriLOSEC) 20 mg delayed release capsule   Yes No   Sig: omeprazole 20 mg capsule,delayed release   omeprazole (PriLOSEC) 40 MG capsule  Self Yes No   Sig: Take 40 mg by mouth daily   ondansetron (ZOFRAN) 4 mg tablet   No No   Sig: Take 1 tablet (4 mg total) by mouth every 8 (eight) hours as needed for nausea or vomiting   prochlorperazine (COMPAZINE) 10 mg tablet  Self No No   Sig: Take 1 tablet (10 mg total) by mouth every 6 (six) hours as needed for nausea or vomiting (migraine)   ranolazine (RANEXA) 1000 MG SR tablet   No No   Sig: Take 1 tablet (1,000 mg total) by mouth every 12 (twelve) hours   sertraline (ZOLOFT) 100 mg tablet  Self No No   Sig: Take 1.5 tablets (150 mg total) by mouth daily   Patient not taking: Reported on 8/11/2021   sucralfate (CARAFATE) 1 g/10 mL suspension   No No   Sig: Take 10 mL (1 g total) by mouth 4 (four) times a day   tamsulosin (FLOMAX) 0.4 mg  Self Yes No   Sig: Take 0.4 mg by mouth daily with dinner.    traZODone (DESYREL) 100 mg tablet  Self No No   Sig: Take 2 tablets (200 mg total) by mouth daily at bedtime as needed for sleep   Patient not taking: Reported on 8/11/2021      Facility-Administered Medications: None       Past Medical History:   Diagnosis Date   • Acute on chronic diastolic congestive heart failure (HCC)    • Altered gait    • Alzheimer disease ([address])     per patients,,early onset    • Angina pectoris [facility])    • Anxiety    • Arthritis    • Brain aneurysm     coils placed   • Cardiac disease    • Chest pain 1/13/2016   • Chronic kidney disease    • Chronic pain     back/ right groin and rle- has morphine pump   • Constipation    • COPD (chronic obstructive pulmonary disease) ([facility])    • Coronary artery disease    • CPAP (continuous positive airway pressure) dependence    • Decubital ulcer     sacral decub-occured 5/2019-sees wound care/debide in OR today 6/6/2019   • Dependent on walker for ambulation     w/c for long distance   • Depression    • Diabetes mellitus ([facility])     insulin dependent   • Dizziness     occ   • Dysphagia    • Enlarged prostate    • Fall    • GERD (gastroesophageal reflux disease)    • Heart failure ([address])    • Hiatal hernia    • Hx of gastric bypass 11/19/2018   • Hypercholesterolemia    • Hypertension    • MI (myocardial infarction) ([address])     2017- stents x2   • Migraine    • Morbid obesity ([address])     gastric bypass sleeve 11/2018-wt loss 125 lb   • Neuropathy    • Oxygen dependent     Q HS  2LPM with CPAP and prn during day 2-3 LPM    • Pressure injury of skin    • Renal disorder    • Shortness of breath    • Skin abnormality     sacral wound - covered with pad   • Sleep apnea    • Stented coronary artery    • Stroke ([address])     vision loss b/l  2005, residual R leg weakness   • Urinary frequency    • Use of cane as ambulatory aid    • Wears dentures    • Wears glasses    • Wears glasses    • Wheelchair dependent        Past Surgical History:   Procedure Laterality Date   • BACK SURGERY     • BRAIN SURGERY     • CARDIAC CATHETERIZATION      with stents   • CARDIAC CATHETERIZATION N/A 8/18/2023    Procedure: Cardiac Coronary Angiogram;  Surgeon: Jason Howell MD;  Location: [facility]; Service: Cardiology   • CEREBRAL ANEURYSM REPAIR      with coils   • COLONOSCOPY     • ESOPHAGOGASTRODUODENOSCOPY N/A 7/1/2016    Procedure: ESOPHAGOGASTRODUODENOSCOPY (EGD); Surgeon: Jean Yu MD;  Location: [facility] GI LAB;   Service:    • GASTRIC BYPASS  11/19/2018   • HERNIA REPAIR     • HIATAL HERNIA REPAIR     • INFUSION PUMP IMPLANTATION Left     morphine   • INTRATHECAL PUMP IMPLANTATION Left 7/9/2020    Procedure: REVISION INTRATHECAL PAIN PUMP POCKET, LEFT ABDOMEN;  Surgeon: Juany Foy MD;  Location: [facility];  Service: Neurosurgery   • KNEE ARTHROSCOPY Right    • KNEE ARTHROSCOPY Right    • PERONEAL NERVE DECOMPRESSION Right    • MD DEBRIDEMENT OPEN WOUND 20 SQ CM/< N/A 6/6/2019    Procedure: EXCISIONAL DEBRIDEMENT OF SACRAL DECUBITUS ULCER;  Surgeon: Shannen Long MD;  Location: [facility];  Service: General   • MD ESOPHAGOGASTRODUODENOSCOPY TRANSORAL DIAGNOSTIC N/A 2/27/2017    Procedure: ESOPHAGOGASTRODUODENOSCOPY (EGD); Surgeon: Samantha Umana MD;  Location: [facility] GI [facility]; Service: Gastroenterology   • MD ESOPHAGOGASTRODUODENOSCOPY TRANSORAL DIAGNOSTIC N/A 8/23/2018    Procedure: ESOPHAGOGASTRODUODENOSCOPY (EGD) with biopsy;  Surgeon: Samantha Umana MD;  Location: [facility];   Service: Gastroenterology   • MD IMPLTJ/RPLCMT ITHCL/EDRL DRUG NFS PRGRBL PUMP Left 10/13/2020    Procedure: EXPLORATION OF INTRATHECAL PAIN PUMP SYSTEM INTEGRITY FOR POSSIBLE REPLACEMENT OF CATHETER AND PUMP.;  Surgeon: Juany Foy MD;  Location: [facility];  Service: Neurosurgery   • MD IMPLTJ/RPLCMT ITHCL/EDRL DRUG NFS SUBQ RSVR N/A 1/19/2017    Procedure: REMOVAL / EXCHANGE INTRATHECAL PAIN PUMP;  Surgeon: Divya Martinez MD;  [facility];  Service: Orthopedics   • MD IMPLTJ/RPLCMT ITHCL/EDRL DRUG NFS SUBQ RSVR N/A 5/16/2016    Procedure: REPLACEMENT AND PROGRAM PUMP ;  Surgeon: Divya Martinez MD;  [facility];  Service: Orthopedics   • MD PRQ IMPLTJ NSTIM ELECTRODE ARRAY EPIDURAL Right 3/17/2021    Procedure: INSERTION THORACIC DORSAL COLUMN SPINAL CORD STIMULATOR PERCUTANEOUS W IMPLANTABLE PULSE GENERATOR, RIGHT;  Surgeon: Juany Foy MD;  Location: [facility];  Service: Neurosurgery       Family History   Problem Relation Age of Onset   • Diabetes unspecified Mother    • Diabetes unspecified Brother    • Diabetes unspecified Paternal Uncle    • Diabetes unspecified Maternal Grandmother    • Diabetes unspecified Paternal Grandmother    • Diabetes unspecified Brother    • Heart attack Father      I have reviewed and agree with the history as documented. E-Cigarette/Vaping   • E-Cigarette Use Never User      E-Cigarette/Vaping Substances   • Nicotine No    • THC No    • CBD No    • Flavoring No    • Other No    • Unknown No      Social History     Tobacco Use   • Smoking status: Never   • Smokeless tobacco: Never   Vaping Use   • Vaping Use: Never used   Substance Use Topics   • Alcohol use: Not Currently   • Drug use: Not Currently     Types: Marijuana       Review of Systems   Cardiovascular: Positive for chest pain. All other systems reviewed and are negative. Physical Exam  Physical Exam  Vitals and nursing note reviewed. Constitutional:       Appearance: Normal appearance. Comments: Morbidly obese w/ BMI 41.37   HENT:      Mouth/Throat:      Mouth: Mucous membranes are moist.   Eyes:      Conjunctiva/sclera: Conjunctivae normal.   Cardiovascular:      Rate and Rhythm: Normal rate and regular rhythm. Heart sounds: No murmur heard. No friction rub. No gallop. Pulmonary:      Effort: Pulmonary effort is normal. No respiratory distress. Breath sounds: Normal breath sounds. No stridor. No rhonchi or rales. Chest:      Chest wall: No tenderness. Musculoskeletal:         General: No swelling or tenderness. Cervical back: Normal range of motion. Skin:     General: Skin is warm. Capillary Refill: Capillary refill takes less than 2 seconds. Neurological:      General: No focal deficit present. Mental Status: He is alert and oriented to person, place, and time.    Psychiatric:         Mood and Affect: Mood normal.         Vital Signs  ED Triage Vitals [08/23/23 1137]   Temperature Pulse Respirations Blood Pressure SpO2   98.4 °F (36.9 °C) 83 20 137/70 97 %      Temp Source Heart Rate Source Patient Position - Orthostatic VS BP Location FiO2 (%)   Oral Monitor Lying Right arm --      Pain Score       10 - Worst Possible Pain           Vitals:    08/23/23 1400 08/23/23 1500 08/23/23 1605 08/23/23 1630   BP: 155/77 152/74 158/75 (!) 188/103   Pulse: 69 66 68 76   Patient Position - Orthostatic VS:   Sitting Sitting         Visual Acuity      ED Medications  Medications   acetaminophen (TYLENOL) tablet 650 mg (has no administration in time range)   ondansetron (ZOFRAN) injection 4 mg (has no administration in time range)   enoxaparin (LOVENOX) subcutaneous injection 40 mg (40 mg Subcutaneous Given 8/23/23 1611)   amLODIPine (NORVASC) tablet 5 mg (has no administration in time range)   ARIPiprazole (ABILIFY) tablet 10 mg (has no administration in time range)   aspirin chewable tablet 81 mg (has no administration in time range)   atorvastatin (LIPITOR) tablet 80 mg (has no administration in time range)   baclofen tablet 10 mg (has no administration in time range)   carvedilol (COREG) tablet 12.5 mg (12.5 mg Oral Given 8/17/57 2508)   folic acid (FOLVITE) tablet 1 mg (has no administration in time range)   clopidogrel (PLAVIX) tablet 75 mg (has no administration in time range)   gabapentin (NEURONTIN) capsule 600 mg (has no administration in time range)   isosorbide mononitrate (IMDUR) 24 hr tablet 60 mg (has no administration in time range)   hydrOXYzine HCL (ATARAX) tablet 75 mg (has no administration in time range)   methocarbamol (ROBAXIN) tablet 750 mg (has no administration in time range)   pantoprazole (PROTONIX) EC tablet 40 mg (has no administration in time range)   ranolazine (RANEXA) 12 hr tablet 1,000 mg (has no administration in time range)   tamsulosin (FLOMAX) capsule 0.4 mg (0.4 mg Oral Given 8/23/23 1611)   morphine injection 2 mg (has no administration in time range)   ondansetron (ZOFRAN) injection 4 mg (4 mg Intravenous Given 8/23/23 1329)   nitroglycerin (NITRO-BID) 2 % TD ointment 1 inch (1 inch Topical Given 8/23/23 9728)       Diagnostic Studies  Results Reviewed     Procedure Component Value Units Date/Time    HS Troponin I 4hr [[id number]]  (Abnormal) Collected: 08/23/23 1607    Lab Status: Final result Specimen: Blood from Hand, Right Updated: 08/23/23 1635     hs TnI 4hr 533 ng/L      Delta 4hr hsTnI 36 ng/L     C-reactive protein [[id number]]  (Abnormal) Collected: 08/23/23 1607    Lab Status: Final result Specimen: Blood from Hand, Right Updated: 08/23/23 1627     CRP 16.3 mg/L     Sedimentation rate, automated [[id number]]  (Abnormal) Collected: 08/23/23 1607    Lab Status: Final result Specimen: Blood from Arm, Right Updated: 08/23/23 1615     Sed Rate 38 mm/hour     HS Troponin I 2hr [[id number]]  (Abnormal) Collected: 08/23/23 1401    Lab Status: Final result Specimen: Blood from Hand, Left Updated: 08/23/23 1436     hs TnI 2hr 494 ng/L      Delta 2hr hsTnI -3 ng/L     B-Type Natriuretic Peptide(BNP) [[id number]]  (Normal) Collected: 08/23/23 1219    Lab Status: Final result Specimen: Blood from Arm, Left Updated: 08/23/23 1307     BNP 53 pg/mL     HS Troponin 0hr (reflex protocol) [[id number]]  (Abnormal) Collected: 08/23/23 1152    Lab Status: Final result Specimen: Blood from Arm, Left Updated: 08/23/23 1223     hs TnI 0hr 497 ng/L     Comprehensive metabolic panel [[id number]]  (Abnormal) Collected: 08/23/23 1152    Lab Status: Final result Specimen: Blood from Arm, Left Updated: 08/23/23 1216     Sodium 138 mmol/L      Potassium 4.4 mmol/L      Chloride 110 mmol/L      CO2 20 mmol/L      ANION GAP 8 mmol/L      BUN 10 mg/dL      Creatinine 1.18 mg/dL      Glucose 109 mg/dL      Calcium 8.6 mg/dL      AST 38 U/L      ALT 27 U/L      Alkaline Phosphatase 63 U/L      Total Protein 7.0 g/dL      Albumin 3.8 g/dL      Total Bilirubin 0.60 mg/dL      eGFR 66 ml/min/1.73sq m     Narrative:      [place] guidelines for Chronic Kidney Disease (CKD):   •  Stage 1 with normal or high GFR (GFR > 90 mL/min/1.73 square meters)  •  Stage 2 Mild CKD (GFR = 60-89 mL/min/1.73 square meters)  •  Stage 3A Moderate CKD (GFR = 45-59 mL/min/1.73 square meters)  •  Stage 3B Moderate CKD (GFR = 30-44 mL/min/1.73 square meters)  •  Stage 4 Severe CKD (GFR = 15-29 mL/min/1.73 square meters)  •  Stage 5 End Stage CKD (GFR <15 mL/min/1.73 square meters)  Note: GFR calculation is accurate only with a steady state creatinine    CBC and differential [[id number]]  (Abnormal) Collected: 08/23/23 1152    Lab Status: Final result Specimen: Blood from Arm, Left Updated: 08/23/23 1159     WBC 10.83 Thousand/uL      RBC 5.03 Million/uL      Hemoglobin 15.0 g/dL      Hematocrit 46.9 %      MCV 93 fL      MCH 29.8 pg      MCHC 32.0 g/dL      RDW 13.1 %      MPV 10.0 fL      Platelets 995 Thousands/uL      nRBC 0 /100 WBCs      Neutrophils Relative 68 %      Immat GRANS % 1 %      Lymphocytes Relative 19 %      Monocytes Relative 7 %      Eosinophils Relative 4 %      Basophils Relative 1 %      Neutrophils Absolute 7.46 Thousands/µL      Immature Grans Absolute 0.10 Thousand/uL      Lymphocytes Absolute 2.05 Thousands/µL      Monocytes Absolute 0.73 Thousand/µL      Eosinophils Absolute 0.44 Thousand/µL      Basophils Absolute 0.05 Thousands/µL                  XR chest 1 view portable   Final Result by Kirit Ortiz DO (08/23 5973)      No acute cardiopulmonary disease. Resident: Radha Denis, the attending radiologist, have reviewed the images and agree with the final report above.       Workstation performed: [id number]                    Procedures  ECG 12 Lead Documentation Only    Date/Time: 8/23/2023 12:13 PM    Performed by: Paul Russell DO  Authorized by: Paul Russell DO    Indications / Diagnosis:  Chest pain  ECG reviewed by me, the ED Provider: yes    Patient location:  ED  Previous ECG:     Previous ECG:  Compared to current    Similarity:  Changes noted  Interpretation:     Interpretation: non-specific    Quality:     Tracing quality:  Limited by artifact  Rate:     ECG rate:  84    ECG rate assessment: normal    Rhythm:     Rhythm: sinus rhythm    Ectopy:     Ectopy: none    QRS:     QRS axis:  Left  Conduction:     Conduction: abnormal      Abnormal conduction: complete RBBB    ST segments:     ST segments:  Non-specific  T waves:     T waves: non-specific    Q waves:     Q waves:  II, III and aVF    ECG 12 Lead Documentation Only    Date/Time: 8/23/2023 2:12 PM    Performed by: Paul Russell DO  Authorized by: Paul Russell DO    Indications / Diagnosis:  Delta  ECG reviewed by me, the ED Provider: yes    Patient location:  ED  Previous ECG:     Previous ECG:  Compared to current    Similarity:  No change  Interpretation:     Interpretation: non-specific    Rate:     ECG rate:  69    ECG rate assessment: normal    Rhythm:     Rhythm: sinus rhythm    QRS:     QRS axis:  Left  Conduction:     Conduction: abnormal      Abnormal conduction: complete RBBB    ST segments:     ST segments:  Non-specific  T waves:     T waves: non-specific    Q waves:     Q waves:  III, aVF and II             ED Course  ED Course as of 08/23/23 1641   Wed Aug 23, 2023   1300 hs TnI 0hr(!): 497  Much lower than 5d ago, but will need delta to determine if CP indicating new NSTEMI. Will d/w cardiology   1420 CXR: Xray reviewed and independently interpreted by me: no acute findings       1441 hs TnI 2hr(!): 494  Dr. Dennis Rice, cardiology aware - will review the chart and let me know his recommendations. 36 D/w Dr. Dennis Rice - recommends admission for medication management at this time. TT sent to [facility] for admission   1513 Discussed with [facility]. We had a detailed discussion of the patient's condition and case, including the need for admission. Accepted to their service. Bed request / bridging orders placed.     1521 Per Dr. Dennis Rice - after discussion w/ Dr. Bishnu Campos, interventional cards - pt will need transfer to [facility] for Cardiac MR. Admission cancelled and transfer order placed   [phone] Per [facility], PAC - [facility] at capacity w/ 15 pending transfers. She will contact [facility] for admission and put him "in line". D/w Dr. Yan Lucas. Pt will be admitted here for medical management pending transfer. SBIRT 22yo+    Flowsheet Row Most Recent Value   Initial Alcohol Screen: US AUDIT-C     1. How often do you have a drink containing alcohol? 0 Filed at: 08/23/2023 1219   2. How many drinks containing alcohol do you have on a typical day you are drinking? 0 Filed at: 08/23/2023 1219   3a. Male UNDER 65: How often do you have five or more drinks on one occasion? 0 Filed at: 08/23/2023 1219   Audit-C Score 0 Filed at: 08/23/2023 1219   BARBARA: How many times in the past year have you. .. Used an illegal drug or used a prescription medication for non-medical reasons? Never Filed at: 08/23/2023 1219                    Medical Decision Making  Will get EKG to r/o arrhythmia, ischemic changes. Will get labs to r/o acute life threatening metabolic abnl, cardiac ischemia, significant anemia. Will get CXR to r/o vascular congestion/pulmonary edema. Will d/w cardiology once labs obtained. Chest pain: acute illness or injury that poses a threat to life or bodily functions  Amount and/or Complexity of Data Reviewed  Independent Historian: spouse  External Data Reviewed: notes. Details: prior admission/discharge reviewed  Labs: ordered. Decision-making details documented in ED Course. Radiology: ordered and independent interpretation performed. ECG/medicine tests: ordered and independent interpretation performed. Discussion of management or test interpretation with external provider(s): D/w Dr. Dennis Rice, Cardiology  D/w GERALDINE Delgado  D/w Dr. Mery Luu, LANETTE SMITH    Risk  Prescription drug management.   Decision regarding hospitalization. Disposition  Final diagnoses:   Chest pain     Time reflects when diagnosis was documented in both MDM as applicable and the Disposition within this note     Time User Action Codes Description Comment    8/23/2023  3:05 PM Fina Claudio Add [R07.9] Chest pain       ED Disposition     ED Disposition   Transfer to Another Facility-In Network    Condition   --    Date/Time   Wed Aug 23, 2023  4:28 PM    Comment   Case was discussed with Dr. Kelsi Osman and the patient's admission status was agreed to be observation to the service of Dr. Kelsi Osman     - Admission to [facility] cancelled. Transfer order placed. 1627 Accepted by Dr. Carolyn Negrete [facility]    [phone] [facility] at capacity w/  15 pending transfers. D/w Dr. Kelsi Osman, [facility] - admit here - inpatient for continued medical management pending transfer.            MD Documentation    Brand Prophet Most Recent Value   Patient Condition The patient has been stabilized such that within reasonable medical probability, no material deterioration of the patient condition or the condition of the unborn child(jc) is likely to result from the transfer   Reason for Transfer Level of Care needed not available at this facility  [Cardiac MRI]   Benefits of Transfer Specialized equipment and/or services available at the receiving facility (Include comment)________________________  Gladys Richer MRI]   Risks of Transfer Potential for delay in receiving treatment, Potential deterioration of medical condition, Loss of IV, Possible worsening of condition or death during transfer, Increased discomfort during transfer   [facility] Phys[facility], [address] [place] [address]    (Name & Tel number) Radha Hicks   Provider Certification General risk, such as traffic hazards, adverse weather conditions, rough terrain or turbulence, possible failure of equipment (including vehicle or aircraft), or consequences of actions of persons outside the control of the transport personnel, Unanticipated needs of medical equipment and personnel during transport, The possibility of a transport vehicle being unavailable, Risk of worsening condition      RN Documentation    [address] Name, Iraida & Pedro Bradshaw. [facility] (Name & Tel number) KurtRejiter      Follow-up Information    None         Current Discharge Medication List      CONTINUE these medications which have NOT CHANGED    Details   nitroglycerin (NITROSTAT) 0.4 mg SL tablet Place 0.4 mg under the tongue every 5 (five) minutes as needed for chest pain (pt has not  used recently). albuterol (ACCUNEB) 1.25 MG/3ML nebulizer solution Take 1 ampule by nebulization every 6 (six) hours as needed for wheezing      amLODIPine (NORVASC) 5 mg tablet Take 1 tablet (5 mg total) by mouth daily Do not start before August 22, 2023. Qty: 30 tablet, Refills: 0    Associated Diagnoses: STEMI (ST elevation myocardial infarction) ([address])      ARIPiprazole (ABILIFY) 5 mg tablet Take 10 mg by mouth daily       aspirin 81 mg chewable tablet Chew 1 tablet (81 mg total) daily Do not start before August 22, 2023.   Qty: 30 tablet, Refills: 0    Associated Diagnoses: STEMI (ST elevation myocardial infarction) ([address])      atorvastatin (LIPITOR) 80 mg tablet Take 1 tablet (80 mg total) by mouth daily after dinner  Qty: 30 tablet, Refills: 0    Associated Diagnoses: STEMI (ST elevation myocardial infarction) (HCC)      baclofen 10 mg tablet Take 10 mg by mouth 3 (three) times a day      CALCIUM PO Take 1 tablet by mouth daily      carvedilol (COREG) 12.5 mg tablet Take 1 tablet (12.5 mg total) by mouth 2 (two) times a day with meals  Qty: 60 tablet, Refills: 0    Associated Diagnoses: STEMI (ST elevation myocardial infarction) (HCC)      clopidogrel (PLAVIX) 75 mg tablet Take 75 mg by mouth daily      Cyanocobalamin (VITAMIN B-12 PO) Take 1 tablet by mouth daily      ergocalciferol (VITAMIN D2) 50,000 units Take 50,000 Units by mouth once a week  Refills: 0      folic acid (FOLVITE) 1 mg tablet Take 1 mg by mouth daily   Refills: 3      gabapentin (NEURONTIN) 300 mg capsule TAKE 1 CAPSULE (300 MG TOTAL) BY MOUTH AS NEEDED (MIGRAINE)  Qty: 30 capsule, Refills: 0    Associated Diagnoses: Chronic migraine without aura without status migrainosus, not intractable      gabapentin (NEURONTIN) 600 MG tablet Take 600 mg by mouth 3 (three) times a day      hydrocortisone 1 % lotion APPLY TOPICALLY 2 (TWO) TIMES A DAY INDICATIONS  USING ON FEET.      hydrOXYzine HCL (ATARAX) 25 mg tablet Take 75 mg by mouth 2 (two) times a day as needed for anxiety       isosorbide mononitrate (IMDUR) 60 mg 24 hr tablet Take 1 tablet (60 mg total) by mouth daily Do not start before August 22, 2023. Qty: 30 tablet, Refills: 0    Associated Diagnoses: STEMI (ST elevation myocardial infarction) ([address])      memantine (NAMENDA) 10 mg tablet TAKE 1 TABLET (10 MG TOTAL) BY MOUTH 2 (TWO) TIMES A DAY.       methocarbamol (ROBAXIN) 750 mg tablet Take 1 tablet (750 mg total) by mouth every 6 (six) hours as needed for muscle spasms  Qty: 28 tablet, Refills: 0    Associated Diagnoses: Status post surgery      Morphine Sulfate Microinfusion (MORPHINE SULF MICROINFUSION PF IJ) Inject 14 mg as directed daily Via infusion pump      nystatin (MYCOSTATIN) cream Apply 1 application topically 2 (two) times a day      !! omeprazole (PriLOSEC) 20 mg delayed release capsule omeprazole 20 mg capsule,delayed release      !! omeprazole (PriLOSEC) 40 MG capsule Take 40 mg by mouth daily      ondansetron (ZOFRAN) 4 mg tablet Take 1 tablet (4 mg total) by mouth every 8 (eight) hours as needed for nausea or vomiting  Qty: 60 tablet, Refills: 3    Associated Diagnoses: Nausea      Pediatric Multivit-Minerals-C (Polyvitamin/Iron) CHEW Chew 1 tablet daily      POTASSIUM PO Take 40 mEq by mouth 2 (two) times a day      prochlorperazine (COMPAZINE) 10 mg tablet Take 1 tablet (10 mg total) by mouth every 6 (six) hours as needed for nausea or vomiting (migraine)  Qty: 20 tablet, Refills: 0    Associated Diagnoses: Chronic migraine without aura without status migrainosus, not intractable      ranolazine (RANEXA) 1000 MG SR tablet Take 1 tablet (1,000 mg total) by mouth every 12 (twelve) hours  Qty: 60 tablet, Refills: 0    Associated Diagnoses: STEMI (ST elevation myocardial infarction) (HCC)      sertraline (ZOLOFT) 100 mg tablet Take 1.5 tablets (150 mg total) by mouth daily  Refills: 0    Associated Diagnoses: Major depressive disorder, recurrent, moderate (HCC)      sucralfate (CARAFATE) 1 g/10 mL suspension Take 10 mL (1 g total) by mouth 4 (four) times a day  Qty: 420 mL, Refills: 3    Associated Diagnoses: Gastroesophageal reflux disease without esophagitis; Nausea      tamsulosin (FLOMAX) 0.4 mg Take 0.4 mg by mouth daily with dinner. traZODone (DESYREL) 100 mg tablet Take 2 tablets (200 mg total) by mouth daily at bedtime as needed for sleep  Qty: 14 tablet, Refills: 0    Associated Diagnoses: Major depressive disorder, recurrent, moderate (HCC)      ULTICARE SHORT PEN NEEDLES 31G X 8 MM MISC 4 INJECTIONS PER DAY DX  E11.8  Refills: 1       !! - Potential duplicate medications found. Please discuss with provider. No discharge procedures on file.     PDMP Review       Value Time User    PDMP Reviewed  Yes 10/13/2020  1:12 PM Felton Hernandez PA-C          ED Provider  Electronically Signed by           Fina Claudio DO  08/23/23 [id number]

## 2023-08-23 NOTE — TELEPHONE ENCOUNTER
P/C having severe chest pains, SOB and he /132. Advised call 911, per pt has to get him pump refilled, pt is on a fentanyl pump   Pt advised he is having sweat,chest pains,SOB    Advised needs to be evaluate at ED.  Pt verbally understood    Pt has an appt 9/8/2023 Terri Neff

## 2023-08-23 NOTE — Clinical Note
Case was discussed with Dr. Raheem Camilo and the patient's admission status was agreed to be observation to the service of Dr. Raheem Camilo    [phone] - Admission to [address] [place] [address] cancelled. Transfer order placed. [facility] at capacity w/ 15 pending transfers. D/w Dr. Raheem Camilo, [facility] - admit here - inpatient for continued medical management pending transfer.

## 2023-08-23 NOTE — CONSULTS
Consultation - General Cardiology Team 2  Arjun Corcoran 63 y.o. male MRN: [id number]  Unit/Bed#: ED-28 Encounter: [id number]      Consults  PCP: Samuel iFnney MD   Outpatient Cardiologist: MU-> now wishes to initiate care at [facility]) outpatient cardiology     History of Present Illness   Physician Requesting Consult: Edu Yang,   Reason for Consult / Principal Problem: Chest pain       Assessment/Plan     Assessment:    1. Persistent cardiac angina- Myopericarditis vs. less likely thromboembolic CAD?   - Continues to complain of chest pain, took several nitros prior to presenting, without relief. Pain was described as substernal heaviness, worse on exertion or emotional distress. Also reports worsening pain with cough and deep breathing when he is "worked up"  - Endorses compliance with dual antiplatelet therapy, Plavix 75 and aspirin 81, in addition to BP meds and antianginal meds   - Possibly a large first septal might be the culprit, there is ERIKA II flow as per notes from admission to [facility]   - New ECG changes suggestive of ST segment depression in V4 through V6 and T wave inversions in V1 V2 V3  - Troponin of 497 on current presentation, delta of 0 at 2 hours    2. Recent NSTEMI (discharged on 8/21 from [facility])-   - Presented with chest pain to [facility] on 8/18, peak troponin 17,000. EKG with lateral T wave inversions, also noted to have new Q-waves in the anterior leads      - Was discharged on aspirin, Plavix, beta-blocker, statin, Imdur 60 mg daily, Ranexa 1000 mg twice daily. - CAD history as follows:  • First MI 2010  • Drug-eluting stent x1 to the circumflex in 2015  • Drug-eluting stent to the mid RCA in 2017. • Drug-eluting stent to the proximal LAD in 2020  • Drug-eluting stent to the proximal and mid LAD in 2022    - [facility](8/18/23): Dist LM lesion is 50% stenosed. Ost LAD lesion is 40% stenosed. Mid LAD lesion is 40% stenosed. Mid Cx lesion is 20% stenosed.     2.  Preserved biventricular systolic function: EF 38% with hypokinesis of the basal mid anteroseptal wall, G1DD, normal RV function, no significant valvular abnormality. 3.  Type II DM: Hemoglobin A1c 5.3 in 7/2022. Management per primary team  4. Chronic pain: On morphine pump  5. Morbid obesity: With history of prior gastric sleeve. BMI 41. Plan:  1. Reviewed patient's [facility] films from 8/18 with interventional cardiology. Disease in the left main is less likely to be culprit for the current symptoms. 2.  Considering elevated troponin levels and pleuritic chest pain, will discuss with [facility] to transfer the patient for cardiac MRI. Myopericarditis remains a concern  3. Continue with current antianginal medications, dual antiplatelet therapy, and BP management  4. Repeat TTE to assess for new RWMA      Case discussed and reviewed with Dr. Mary Jane Izquierdo who agrees with my assessment and plan. Thank you for involving us in the care of your patient. Wilma Luong MD  Cardiology Fellow   PGY-5    HPI: Shantelle Grey is a 63y.o. year old male with history of CAD with 6 stents in the past, DM2, HTN, depression, chronic pain on opiates, who presented with chest pain and elevated troponin s/p cath on 8/18/23 which showed 50% LM, 40% LAD, 20% LCx-- no clear culprit. Review of Systems  Review of system was conducted and was negative except for as stated in the HPI.       Historical Information   Past Medical History:   Diagnosis Date   • Acute on chronic diastolic congestive heart failure (HCC)    • Altered gait    • Alzheimer disease (HCC)     per patients,,early onset    • Angina pectoris (HCC)    • Anxiety    • Arthritis    • Brain aneurysm     coils placed   • Cardiac disease    • Chest pain 1/13/2016   • Chronic kidney disease    • Chronic pain     back/ right groin and rle- has morphine pump   • Constipation    • COPD (chronic obstructive pulmonary disease) ([facility])    • Coronary artery disease    • CPAP (continuous positive airway pressure) dependence    • Decubital ulcer     sacral decub-occured 5/2019-sees wound care/debide in OR today 6/6/2019   • Dependent on walker for ambulation     w/c for long distance   • Depression    • Diabetes mellitus ([address])     insulin dependent   • Dizziness     occ   • Dysphagia    • Enlarged prostate    • Fall    • GERD (gastroesophageal reflux disease)    • Heart failure ([address])    • Hiatal hernia    • Hx of gastric bypass 11/19/2018   • Hypercholesterolemia    • Hypertension    • MI (myocardial infarction) ([address])     2017- stents x2   • Migraine    • Morbid obesity ([address])     gastric bypass sleeve 11/2018-wt loss 125 lb   • Neuropathy    • Oxygen dependent     Q HS  2LPM with CPAP and prn during day 2-3 LPM    • Pressure injury of skin    • Renal disorder    • Shortness of breath    • Skin abnormality     sacral wound - covered with pad   • Sleep apnea    • Stented coronary artery    • Stroke ([address])     vision loss b/l  2005, residual R leg weakness   • Urinary frequency    • Use of cane as ambulatory aid    • Wears dentures    • Wears glasses    • Wears glasses    • Wheelchair dependent      Past Surgical History:   Procedure Laterality Date   • BACK SURGERY     • BRAIN SURGERY     • CARDIAC CATHETERIZATION      with stents   • CARDIAC CATHETERIZATION N/A 8/18/2023    Procedure: Cardiac Coronary Angiogram;  Surgeon: Alma Mcbride MD;  Location: [facility]; Service: Cardiology   • CEREBRAL ANEURYSM REPAIR      with coils   • COLONOSCOPY     • ESOPHAGOGASTRODUODENOSCOPY N/A 7/1/2016    Procedure: ESOPHAGOGASTRODUODENOSCOPY (EGD); Surgeon: Edilia Saba MD;  Location: [facility] GI LAB;   Service:    • GASTRIC BYPASS  11/19/2018   • HERNIA REPAIR     • HIATAL HERNIA REPAIR     • INFUSION PUMP IMPLANTATION Left     morphine   • INTRATHECAL PUMP IMPLANTATION Left 7/9/2020    Procedure: REVISION INTRATHECAL PAIN PUMP POCKET, LEFT ABDOMEN;  Surgeon: Aline Ruth MD;  Location: [facility] MAIN OR; Service: Neurosurgery   • KNEE ARTHROSCOPY Right    • KNEE ARTHROSCOPY Right    • PERONEAL NERVE DECOMPRESSION Right    • IA DEBRIDEMENT OPEN WOUND 20 SQ CM/< N/A 6/6/2019    Procedure: EXCISIONAL DEBRIDEMENT OF SACRAL DECUBITUS ULCER;  Surgeon: Jennifer Bonilla MD;  Location: [facility] Main OR;  Service: General   • IA ESOPHAGOGASTRODUODENOSCOPY TRANSORAL DIAGNOSTIC N/A 2/27/2017    Procedure: ESOPHAGOGASTRODUODENOSCOPY (EGD); Surgeon: Gil Ayala MD;  Location: [facility] GI [facility]; Service: Gastroenterology   • IA ESOPHAGOGASTRODUODENOSCOPY TRANSORAL DIAGNOSTIC N/A 8/23/2018    Procedure: ESOPHAGOGASTRODUODENOSCOPY (EGD) with biopsy;  Surgeon: Gil Ayala MD;  Location: [facility];   Service: Gastroenterology   • IA IMPLTJ/RPLCMT ITHCL/EDRL DRUG NFS PRGRBL PUMP Left 10/13/2020    Procedure: EXPLORATION OF INTRATHECAL PAIN PUMP SYSTEM INTEGRITY FOR POSSIBLE REPLACEMENT OF CATHETER AND PUMP.;  Surgeon: Rhett Cohen MD;  Location: [facility];  Service: Neurosurgery   • IA IMPLTJ/RPLCMT ITHCL/EDRL DRUG NFS SUBQ RSVR N/A 1/19/2017    Procedure: REMOVAL / EXCHANGE INTRATHECAL PAIN PUMP;  Surgeon: Rasta Morrow MD;  [facility];  Service: Orthopedics   • IA IMPLTJ/RPLCMT ITHCL/EDRL DRUG NFS SUBQ RSVR N/A 5/16/2016    Procedure: REPLACEMENT AND PROGRAM PUMP ;  Surgeon: Rasta Morrow MD;  [facility];  Service: Orthopedics   • IA PRQ IMPLTJ NSTIM ELECTRODE ARRAY EPIDURAL Right 3/17/2021    Procedure: INSERTION THORACIC DORSAL COLUMN SPINAL CORD STIMULATOR PERCUTANEOUS W IMPLANTABLE PULSE GENERATOR, RIGHT;  Surgeon: Rhett Cohen MD;  Location: [facility];  Service: Neurosurgery     Social History     Substance and Sexual Activity   Alcohol Use Not Currently     Social History     Substance and Sexual Activity   Drug Use Not Currently   • Types: Marijuana     Social History     Tobacco Use   Smoking Status Never   Smokeless Tobacco Never     Family History: non-contributory    Meds/Allergies   Hospital Medications:   No current facility-administered medications for this encounter. Home Medications: (Not in a hospital admission)      No Known Allergies    Objective   Vitals: Blood pressure 155/77, pulse 69, temperature 98.4 °F (36.9 °C), temperature source Oral, resp. rate 21, SpO2 96 %. Orthostatic Blood Pressures    Flowsheet Row Most Recent Value   Blood Pressure 155/77 filed at 08/23/2023 1400   Patient Position - Orthostatic VS Lying filed at 08/23/2023 1137            Invasive Devices     Peripheral Intravenous Line  Duration           Peripheral IV 08/23/23 Left Antecubital <1 day                Physical Exam  GEN: Jerrell Anderson appears well, alert and oriented x 3, pleasant and cooperative, obese, predominantly abdominal obesity    NECK: No JVD or carotid bruits   HEART: Regular rhythm, normal rate, normal S1 and S2, no murmurs, clicks, gallops or rubs   LUNGS: Clear to auscultation bilaterally; no wheezes, rales, or rhonchi; respiration nonlabored   ABDOMEN:  Normoactive bowel sounds, soft, no tenderness, distended secondary to obesity   EXTREMITIES: peripheral pulses palpable; no edema    Lab Results: I have personally reviewed pertinent lab results. Results from last 7 days   Lab Units 08/23/23  1401 08/23/23  1152 08/19/23  0436 08/18/23  2219 08/18/23  2110   HS TNI 0HR ng/L  --  497*  --   --   --    HS TNI 2HR ng/L 494*  --   --    < >  --    HS TNI 4HR ng/L  --   --   --   --  16,943*   HS TNI RAND ng/L  --   --  8,176*  --   --     < > = values in this interval not displayed.          Results from last 7 days   Lab Units 08/23/23  1152 08/19/23  0436 08/18/23  0818   POTASSIUM mmol/L 4.4 3.6 3.6   CO2 mmol/L 20* 21 25   CHLORIDE mmol/L 110* 111* 106   BUN mg/dL 10 10 9   CREATININE mg/dL 1.18 1.18 0.99     Results from last 7 days   Lab Units 08/23/23  1152 08/19/23  0436 08/18/23  1605 08/18/23  1006   HEMOGLOBIN g/dL 15.0 15.2  --  14.9   HEMATOCRIT % 46.9 46.0  --  44.6   PLATELETS Thousands/uL 235 271 266 242     Results from last 7 days   Lab Units 23  0436   TRIGLYCERIDES mg/dL 130   HDL mg/dL 30*   LDL CALC mg/dL 81         Imaging: I have personally reviewed pertinent reports. Telemetry: NSR    EKG:   Date: 23  Interpretation: Normal sinus rhythm, left axis deviation, right bundle branch block, moderate voltage criteria for LVH, ST segment depression in V4 V5 and V6, T wave inversion in V1 through V3      Previous STRESS TEST:  No results found for this or any previous visit. No results found for this or any previous visit. No results found for this or any previous visit. Previous Cath/PCI:  Results for orders placed during the hospital encounter of 17    Cardiac catheterization    Narrative  [address] [place] [address]. [address]  [phone]    [facility]    Invasive Cardiovascular Lab Complete Report    Patient: Scar Mcintosh  MR number: [id number]  Account number: [de-identified]  Study date: 2017  Gender: Male  : 1960  Height: 74 in  Weight: 371.1 lb  BSA: 2.83 m squared    Allergies: NO KNOWN ALLERGIES    Diagnostic Cardiologist:  Rashid Lawson DO  Interventional Cardiologist:  Rashid Lawson DO  Primary Physician:  Brian Waller MD    SUMMARY    CORONARY CIRCULATION:  Distal LAD: There was a 95 % stenosis. Proximal circumflex: There was a 0 % stenosis at the site of a prior stent. Mid RCA: There was a 85 % stenosis. 1ST LESION INTERVENTIONS:  A balloon angioplasty with stent and balloon angioplasty procedure was performed on the 85 % lesion in the mid RCA. Following intervention there was a 0 % residual stenosis. A Resolute Integrity Rx 3.0 x 18 drug-eluting stent was placed across the lesion and deployed at a maximum inflation pressure of 16 marcelle. INDICATIONS:  --  Possible CAD: unstable angina.     PROCEDURES PERFORMED    --  Left heart catheterization without ventriculogram.  --  Left coronary angiography. --  Right coronary angiography. --  Outpatient. --  Mod Sedation Same Physician Initial 15min. --  Mod Sedation Same Physician Add 15min. --  Coronary Catheterization (w/ [facility]). --  Coronary Drug Eluting Stent w/PTCA. --  Intervention on mid RCA: balloon angioplasty, stent, balloon angioplasty. PROCEDURE: The risks and alternatives of the procedures and conscious sedation were explained to the patient and informed consent was obtained. The patient was brought to the cath lab and placed on the table. The planned puncture sites  were prepped and draped in the usual sterile fashion. --  Right radial artery access. After performing an Lionel's test to verify adequate ulnar artery supply to the hand, the radial site was prepped. The puncture site was infiltrated with local anesthetic. The vessel was accessed using the  modified Seldinger technique, a wire was advanced into the vessel, and a sheath was advanced over the wire into the vessel. --  Left heart catheterization without ventriculogram. A catheter was advanced over a guidewire into the ascending aorta. After recording ascending aortic pressure, the catheter was advanced across the aortic valve and left ventricular  pressure was recorded. The catheter was pulled back across the aortic valve and into the ascending aorta and pullback pressures were obtained. --  Left coronary artery angiography. A catheter was advanced over a guidewire into the aorta and positioned in the left coronary artery ostium under fluoroscopic guidance. Angiography was performed. --  Right coronary artery angiography. A catheter was advanced over a guidewire into the aorta and positioned in the right coronary artery ostium under fluoroscopic guidance. Angiography was performed. --  Outpatient. --  Mod Sedation Same Physician Initial 15min. --  Mod Sedation Same Physician Add 15min.     --  Coronary Catheterization (w/ [facility]). LESION INTERVENTION: A balloon angioplasty with stent and balloon angioplasty procedure was performed on the 85 % lesion in the mid RCA. Following intervention there was a 0 % residual stenosis. This was not a bifurcation lesion. This was  an ACC/AHA type B "moderate risk" lesion for intervention. The residual lesion was tubular and demonstrated no evidence of thrombus. There was ERIKA 3 flow before the procedure and ERIKA 3 flow after the procedure. There was no dissection. --  Vessel setup was performed. A Launcher 6Fr Jr 4.0 guiding catheter was used to cannulate the vessel. --  Vessel setup was performed. A Border Stylo 180cm wire was used to cross the lesion. --  Balloon angioplasty was performed, using a Trek Rx 3.0 x 15mm balloon, with 2 inflations and a maximum inflation pressure of 10 marcelle. --  A Resolute Integrity Rx 3.0 x 18 drug-eluting stent was placed across the lesion and deployed at a maximum inflation pressure of 16 marcelle. INTERVENTIONS:  --  Coronary Drug Eluting Stent w/PTCA. PROCEDURE COMPLETION: The patient tolerated the procedure well and was discharged from the cath lab. TIMING: Test started at 09:12. Test concluded at 09:47. HEMOSTASIS: The sheath was removed. The site was compressed with a Hemoband  device. Hemostasis was obtained. MEDICATIONS GIVEN: Baby Aspirin, 324 mg, PO, at 08:44. Plavix 75mg, 75 mg, PO, at 08:44. Fentanyl (1OOmcg/2 ml), 50 mcg, IV, at 09:17. Versed (2mg/2ml), 2 mg, IV, at 09:17. 1% Lidocaine, 2 ml,  subcutaneously, at 09:28. Verapamil (5mg/2ml), 2.5 mg, IV, at 09:29. Heparin 1000 units/ml, 4,000 units, IV, at 09:30. Nitroglycerin (200mcg/ml), 200 mcg, at 09:30. Fentanyl (1OOmcg/2 ml), 50 mcg, IV, at 09:31. Versed (2mg/2ml), 1 mg, IV,  at 09:31. Heparin 1000 units/ml, 6,000 units, IV, at 09:33. CONTRAST GIVEN: 70 ml Omnipaque (350 mg I /ml). RADIATION EXPOSURE: Fluoroscopy time: 3.83 min.     CORONARY VESSELS:   --  The coronary circulation is right dominant. --  Left main: Angiography showed minor luminal irregularities. --  Proximal LAD: Angiography showed minor luminal irregularities. --  Mid LAD: Angiography showed minor luminal irregularities. --  Distal LAD: The vessel was small sized. Angiography showed diffuse disease. There was a 95 % stenosis. --  Proximal circumflex: There was a 0 % stenosis at the site of a prior stent. --  Mid RCA: There was a 85 % stenosis. IMPRESSIONS:  pci rca with shira    RECOMMENDATIONS  weight loss, statin, dapt x 1 year. Prepared and signed by    Qian Szymanski DO  Signed 2017 10:48:50    Study diagram    Angiographic findings  Native coronary lesions:  ·Distal LAD: Lesion 1: 95 % stenosis. ·Proximal circumflex: Lesion 1: 0 % stenosis, site of prior stent. ·Mid RCA: Lesion 1: 85 % stenosis. Intervention results  Native coronary lesions:  ·balloon angioplasty, stent, and balloon angioplasty of the 85 % stenosis in mid RCA. 0 % residual stenosis. Stent: Resolute Integrity Rx 3.0 x 18 drug-eluting. Hemodynamic tables    Pressures:  Baseline  Pressures:  - HR: 66  Pressures:  - Rhythm:  Pressures:  -- Aortic Pressure (S/D/M): 107/62/77  Pressures:  -- Left Ventricle (s/edp): 109/32/--    Outputs:  Baseline  Outputs:  -- CALCULATIONS: Age in years: 56.94  Outputs:  -- CALCULATIONS: Body Surface Area: 2.83  Outputs:  -- CALCULATIONS: Height in cm: 188.00  Outputs:  -- CALCULATIONS: Sex: Male  Outputs:  -- CALCULATIONS: Weight in k.70    No results found for this or any previous visit. No results found for this or any previous visit. ECHO:  Results for orders placed during the hospital encounter of 19    Echo complete with contrast if indicated    Narrative  [address] [place] [address].   [address]  [phone]    Transthoracic Echocardiogram  2D, M-mode, Doppler, and Color Doppler    Study date:  11-Aug-2019    Patient: Jose A Banegas Ashia Irizarry  MR number: [id number]  Account number: [de-identified]  : 1960  Age: 66 years  Gender: Male  Status: Inpatient  Location: Bedside  Height: 72 in  Weight: 239 lb  BP: 125/ 60 mmHg    Indications: Syncope    Diagnoses: R55. - Syncope and collapse    Sonographer:  Alvina Cruz [facility]  Primary Physician:  Reggie King MD  Referring Physician:  Heydi Garcia PA-C  Group:  [facility]  Interpreting Physician:  Rosa Narayanan MD    SUMMARY    LEFT VENTRICLE:  Normal left ventricular systolic function, EF 09%. Normal left ventricular cavity size. Moderate concentric left ventricular hypertrophy. Normal left ventricular wall motion without regional wall motion abnormalities. Normal left  ventricular diastolic function. Normal left atrial pressures. LEFT ATRIUM:  Mild left atrial enlargement. RIGHT ATRIUM:  Mild right atrial enlargement. Left atrium larger than right atrium. AORTIC VALVE:  The aortic valve is tricuspid. The non coronary cusp is heavily calcified and the left coronary cusp is mild-to-moderately calcified. All 3 leaflets have good excursion. There is no aortic stenosis or regurgitation. TRICUSPID VALVE:  There was trace regurgitation. PULMONARY ARTERIES:  In adequate tricuspid regurgitation to evaluate pulmonary artery systolic pressures. HISTORY: PRIOR HISTORY: DM II, CAD with stent placement, ARLEEN, CHF, CKD, Alzheimer's disease    PROCEDURE: The procedure was performed at the bedside. This was a routine study. The transthoracic approach was used. The study included complete 2D imaging, M-mode, complete spectral Doppler, and color Doppler. Image quality was adequate. LEFT VENTRICLE: Normal left ventricular systolic function, EF 93%. Normal left ventricular cavity size. Moderate concentric left ventricular hypertrophy. Normal left ventricular wall motion without regional wall motion abnormalities.   Normal left ventricular diastolic function. Normal left atrial pressures. RIGHT VENTRICLE: The size was normal. Systolic function was normal. Wall thickness was normal.    LEFT ATRIUM: Mild left atrial enlargement. RIGHT ATRIUM: Mild right atrial enlargement. Left atrium larger than right atrium. MITRAL VALVE: Valve structure was normal. There was normal leaflet separation. DOPPLER: The transmitral velocity was within the normal range. There was no evidence for stenosis. There was no regurgitation. AORTIC VALVE: The aortic valve is tricuspid. The non coronary cusp is heavily calcified and the left coronary cusp is mild-to-moderately calcified. All 3 leaflets have good excursion. There is no aortic stenosis or regurgitation. TRICUSPID VALVE: The valve structure was normal. There was normal leaflet separation. DOPPLER: The transtricuspid velocity was within the normal range. There was no evidence for stenosis. There was trace regurgitation. PULMONIC VALVE: Leaflets exhibited normal thickness, no calcification, and normal cuspal separation. DOPPLER: The transpulmonic velocity was within the normal range. There was no regurgitation. PERICARDIUM: There was no pericardial effusion. The pericardium was normal in appearance. AORTA: The root exhibited normal size. PULMONARY ARTERY: In adequate tricuspid regurgitation to evaluate pulmonary artery systolic pressures.     SYSTEM MEASUREMENT TABLES    2D  %FS: 40.9 %  Ao Diam: 3 cm  EF(Teich): 71.8 %  ESV(Teich): 28.1 ml  IVSd: 1.4 cm  LA Diam: 4.2 cm  LVEF MOD A4C: 67.1 %  LVIDd: 4.6 cm  LVIDs: 2.7 cm  LVPWd: 1.5 cm  SV(Teich): 71.5 ml    [employer] Accredited Echocardiography Laboratory    Prepared and electronically signed by    Andrews Sparrow MD  Signed 12-Aug-2019 15:43:17    Results for orders placed during the hospital encounter of 08/18/23    Echo complete w/ contrast if indicated    Interpretation Summary  •  Left Ventricle: Left ventricular cavity size is normal. Wall thickness is mildly increased. The left ventricular ejection fraction is 65%. Systolic function is normal. There is a possible (poorly visualized) area of hypokinesis of the basla mid anteroseptal wall. Diastolic function is mildly abnormal, consistent with grade I (abnormal) relaxation. ROE:  No results found for this or any previous visit. No results found for this or any previous visit. CMR:  No results found for this or any previous visit. No results found for this or any previous visit. No results found for this or any previous visit. HOLTER  Results for orders placed during the hospital encounter of 10/20/17    Holter monitor - 24 hour    Narrative  PT NAME: Rosalia Noble  : 1960  AGE: 49 y.o. GENDER: male  MRN: [id number]   PROCEDURE: Holter monitor - 24 hour        INDICATIONS: Palpitations      FINDINGS:    Holter monitoring revealed predominant sinus rhythm with an average heart rate of 66 BPM, a minimum heart rate of 57 BPM, and a maximum heart rate of 82 BPM.    There were about 813 ventricular ectopic beats, mostly occurring as single PVC's with occasional trigeminy and couplets, with no evidence of ventricular tachycardia. There were about 12 supraventricular ectopic beats, mostly occurring as single PAC's and an atrial pair with no evidence of supraventricular tachycardia, atrial fibrillation, or atrial flutter. There was no evidence of significant bradyarrhythmia or advanced heart block. The longest R-R interval was 1.5 seconds. The patient's symptom diary reported no symptoms. No results found for this or any previous visit.

## 2023-08-23 NOTE — ASSESSMENT & PLAN NOTE
Presented with acute chest pain, substernal similar to previous admission on 08/18  · Recent LHC showing nonobstructive CAD involving multiple lesion, did not require PCI at that time  · Cardiology evaluated and discussed with interventionalists, recommended transfer to [facility] for cardiac MRI  · EKG with ST changes, troponin elevated at 490  · Continue aspirin, statin, Plavix, beta-blocker, Ranexa and Imdur  · Pending transfer to [facility]

## 2023-08-23 NOTE — ASSESSMENT & PLAN NOTE
Lab Results   Component Value Date    HGBA1C 5.3 07/14/2022     Prior A1c control, repeat A1c  Monitor blood glucose checks

## 2023-08-23 NOTE — H&P
[facility]  H&P  Name: Shantelle Grey 63 y.o. male I MRN: [id number]  Unit/Bed#: ED-28 I Date of Admission: 8/23/2023   Date of Service: 8/23/2023 I Hospital Day: 0      Assessment/Plan   * Chest pain  Assessment & Plan  Presented with acute chest pain, substernal similar to previous admission on 08/18  · Recent LHC showing nonobstructive CAD involving multiple lesion, did not require PCI at that time  · Cardiology evaluated and discussed with interventionalists, recommended transfer to [facility] for cardiac MRI  · EKG with ST changes, troponin elevated at 490  · Continue aspirin, statin, Plavix, beta-blocker, Ranexa and Imdur  · Pending transfer to [facility]    Hyperlipidemia  Assessment & Plan  Continue statin    GERD (gastroesophageal reflux disease)  Assessment & Plan  Continue PPI    Chronic pain disorder  Assessment & Plan  Chronic pain involving his right groin, does have pain pump      Type 2 diabetes mellitus with renal complication [facility])  Assessment & Plan  Lab Results   Component Value Date    HGBA1C 5.3 07/14/2022     Prior A1c control, repeat A1c  Monitor blood glucose checks           VTE Prophylaxis: Enoxaparin (Lovenox)  / sequential compression device   Code Status: FC  POLST: There is no POLST form on file for this patient (pre-hospital)    Anticipated Length of Stay:  Patient will be admitted on an Inpatient basis with an anticipated length of stay of  2 midnights. Justification for Hospital Stay: Pending transfer    Chief Complaint:   Chest Pain    History of Present Illness:    Shantelle Grey is a 63 y.o. male who presents with chest pain. Presented with acute chest pain, reportedly took several nitros without relief. Similar symptoms as when he initially presented to [facility] last week though with less intensity. Recently seen at [facility] and discharged on the 18th. There he had a left heart cath which showed nonobstructive multivessel disease.   [facility] of T2DM diabetes, morbid obesity, chronic pain on morphine pump. Does report having shortness of breath, diaphoresis with event. Denies any fevers, cough, nausea or vomiting. Review of Systems:    Review of Systems   Constitutional: Positive for diaphoresis. Respiratory: Positive for chest tightness. Cardiovascular: Positive for chest pain. Negative for palpitations. All other systems reviewed and are negative.       Past Medical and Surgical History:     Past Medical History:   Diagnosis Date   • Acute on chronic diastolic congestive heart failure ([address])    • Altered gait    • Alzheimer disease ([address])     per patients,,early onset    • Angina pectoris (HCC)    • Anxiety    • Arthritis    • Brain aneurysm     coils placed   • Cardiac disease    • Chest pain 1/13/2016   • Chronic kidney disease    • Chronic pain     back/ right groin and rle- has morphine pump   • Constipation    • COPD (chronic obstructive pulmonary disease) ([facility])    • Coronary artery disease    • CPAP (continuous positive airway pressure) dependence    • Decubital ulcer     sacral decub-occured 5/2019-sees wound care/debide in OR today 6/6/2019   • Dependent on walker for ambulation     w/c for long distance   • Depression    • Diabetes mellitus ([facility])     insulin dependent   • Dizziness     occ   • Dysphagia    • Enlarged prostate    • Fall    • GERD (gastroesophageal reflux disease)    • Heart failure ([address])    • Hiatal hernia    • Hx of gastric bypass 11/19/2018   • Hypercholesterolemia    • Hypertension    • MI (myocardial infarction) ([address])     2017- stents x2   • Migraine    • Morbid obesity ([facility])     gastric bypass sleeve 11/2018-wt loss 125 lb   • Neuropathy    • Oxygen dependent     Q HS  2LPM with CPAP and prn during day 2-3 LPM    • Pressure injury of skin    • Renal disorder    • Shortness of breath    • Skin abnormality     sacral wound - covered with pad   • Sleep apnea    • Stented coronary artery    • Stroke [facility])     vision loss b/l  2005, residual R leg weakness   • Urinary frequency    • Use of cane as ambulatory aid    • Wears dentures    • Wears glasses    • Wears glasses    • Wheelchair dependent        Past Surgical History:   Procedure Laterality Date   • BACK SURGERY     • BRAIN SURGERY     • CARDIAC CATHETERIZATION      with stents   • CARDIAC CATHETERIZATION N/A 8/18/2023    Procedure: Cardiac Coronary Angiogram;  Surgeon: Bailey Patino MD;  Location: [facility] CARDIAC CATH LAB; Service: Cardiology   • CEREBRAL ANEURYSM REPAIR      with coils   • COLONOSCOPY     • ESOPHAGOGASTRODUODENOSCOPY N/A 7/1/2016    Procedure: ESOPHAGOGASTRODUODENOSCOPY (EGD); Surgeon: Reggie Leo MD;  Location: [facility] GI LAB; Service:    • GASTRIC BYPASS  11/19/2018   • HERNIA REPAIR     • HIATAL HERNIA REPAIR     • INFUSION PUMP IMPLANTATION Left     morphine   • INTRATHECAL PUMP IMPLANTATION Left 7/9/2020    Procedure: REVISION INTRATHECAL PAIN PUMP POCKET, LEFT ABDOMEN;  Surgeon: John Soto MD;  Location: [facility] MAIN OR;  Service: Neurosurgery   • KNEE ARTHROSCOPY Right    • KNEE ARTHROSCOPY Right    • PERONEAL NERVE DECOMPRESSION Right    • DC DEBRIDEMENT OPEN WOUND 20 SQ CM/< N/A 6/6/2019    Procedure: EXCISIONAL DEBRIDEMENT OF SACRAL DECUBITUS ULCER;  Surgeon: Rocío Meza MD;  Location: [facility] Main OR;  Service: General   • DC ESOPHAGOGASTRODUODENOSCOPY TRANSORAL DIAGNOSTIC N/A 2/27/2017    Procedure: ESOPHAGOGASTRODUODENOSCOPY (EGD); Surgeon: Reggie Leo MD;  Location: [facility] GI LAB; Service: Gastroenterology   • DC ESOPHAGOGASTRODUODENOSCOPY TRANSORAL DIAGNOSTIC N/A 8/23/2018    Procedure: ESOPHAGOGASTRODUODENOSCOPY (EGD) with biopsy;  Surgeon: Reggie Leo MD;  Location: [facility] GI LAB;   Service: Gastroenterology   • DC IMPLTJ/RPLCMT ITHCL/EDRL DRUG NFS PRGRBL PUMP Left 10/13/2020    Procedure: EXPLORATION OF INTRATHECAL PAIN PUMP SYSTEM INTEGRITY FOR POSSIBLE REPLACEMENT OF CATHETER AND PUMP.;  Surgeon: John Soto MD;  Location:  MAIN OR;  Service: Neurosurgery   • MN IMPLTJ/RPLCMT ITHCL/EDRL DRUG NFS SUBQ RSVR N/A 1/19/2017    Procedure: REMOVAL / EXCHANGE INTRATHECAL PAIN PUMP;  Surgeon: Caryle Felt, MD;  Location: [facility] OR;  Service: Orthopedics   • MN IMPLTJ/RPLCMT ITHCL/EDRL DRUG NFS SUBQ RSVR N/A 5/16/2016    Procedure: REPLACEMENT AND PROGRAM PUMP ;  Surgeon: Caryle Felt, MD;  Location: [facility];  Service: Orthopedics   • MN PRQ IMPLTJ NSTIM ELECTRODE ARRAY EPIDURAL Right 3/17/2021    Procedure: INSERTION THORACIC DORSAL COLUMN SPINAL CORD STIMULATOR PERCUTANEOUS W IMPLANTABLE PULSE GENERATOR, RIGHT;  Surgeon: Benjamin Bernal MD;  Location: [facility] MAIN OR;  Service: Neurosurgery       Meds/Allergies:    Prior to Admission medications    Medication Sig Start Date End Date Taking? Authorizing Provider   nitroglycerin (NITROSTAT) 0.4 mg SL tablet Place 0.4 mg under the tongue every 5 (five) minutes as needed for chest pain (pt has not  used recently).      Yes Historical Provider, MD   albuterol (ACCUNEB) 1.25 MG/3ML nebulizer solution Take 1 ampule by nebulization every 6 (six) hours as needed for wheezing    Historical Provider, MD   amLODIPine (NORVASC) 5 mg tablet Take 1 tablet (5 mg total) by mouth daily Do not start before August 22, 2023. 8/22/23 9/21/23  Silver Shaver MD   ARIPiprazole (ABILIFY) 5 mg tablet Take 10 mg by mouth daily  8/6/20   Historical Provider, MD   aspirin 81 mg chewable tablet Chew 1 tablet (81 mg total) daily Do not start before August 22, 2023. 8/22/23 9/21/23  Silver Shaver MD   atorvastatin (LIPITOR) 80 mg tablet Take 1 tablet (80 mg total) by mouth daily after dinner 8/21/23 9/20/23  Silver Shaver MD   baclofen 10 mg tablet Take 10 mg by mouth 3 (three) times a day    Historical Provider, MD   CALCIUM PO Take 1 tablet by mouth daily    Historical Provider, MD   carvedilol (COREG) 12.5 mg tablet Take 1 tablet (12.5 mg total) by mouth 2 (two) times a day with meals 8/21/23 9/20/23  Kevin MAGALLON Lis Tinoco MD   clopidogrel (PLAVIX) 75 mg tablet Take 75 mg by mouth daily    Historical Provider, MD   Cyanocobalamin (VITAMIN B-12 PO) Take 1 tablet by mouth daily    Historical Provider, MD   ergocalciferol (VITAMIN D2) 50,000 units Take 50,000 Units by mouth once a week 1/25/19   Historical Provider, MD   folic acid (FOLVITE) 1 mg tablet Take 1 mg by mouth daily  1/25/19   Historical Provider, MD   gabapentin (NEURONTIN) 300 mg capsule TAKE 1 CAPSULE (300 MG TOTAL) BY MOUTH AS NEEDED (MIGRAINE) 11/8/19   Julio Ramos PA-C   gabapentin (NEURONTIN) 600 MG tablet Take 600 mg by mouth 3 (three) times a day 1/9/20   Historical Provider, MD   hydrocortisone 1 % lotion APPLY TOPICALLY 2 (TWO) TIMES A DAY INDICATIONS  USING ON FEET. 7/1/20   Historical Provider, MD   hydrOXYzine HCL (ATARAX) 25 mg tablet Take 75 mg by mouth 2 (two) times a day as needed for anxiety     Historical Provider, MD   isosorbide mononitrate (IMDUR) 60 mg 24 hr tablet Take 1 tablet (60 mg total) by mouth daily Do not start before August 22, 2023. 8/22/23 9/21/23  Mauro Navarro MD   memantine (NAMENDA) 10 mg tablet TAKE 1 TABLET (10 MG TOTAL) BY MOUTH 2 (TWO) TIMES A DAY. 8/7/20   Historical Provider, MD   methocarbamol (ROBAXIN) 750 mg tablet Take 1 tablet (750 mg total) by mouth every 6 (six) hours as needed for muscle spasms 3/16/21   Brigida Dixon MD   Morphine Sulfate Microinfusion (MORPHINE SULF MICROINFUSION PF IJ) Inject 14 mg as directed daily Via infusion pump    Historical Provider, MD   nystatin (MYCOSTATIN) cream Apply 1 application topically 2 (two) times a day 1/11/20   Historical Provider, MD   omeprazole (PriLOSEC) 20 mg delayed release capsule omeprazole 20 mg capsule,delayed release    Historical Provider, MD   omeprazole (PriLOSEC) 40 MG capsule Take 40 mg by mouth daily    Historical Provider, MD   ondansetron (ZOFRAN) 4 mg tablet Take 1 tablet (4 mg total) by mouth every 8 (eight) hours as needed for nausea or vomiting 8/11/21   Juan Diego Rahman PA-C   Pediatric Multivit-Minerals-C (Polyvitamin/Iron) CHEW Chew 1 tablet daily 8/9/20   Historical Provider, MD   POTASSIUM PO Take 40 mEq by mouth 2 (two) times a day    Historical Provider, MD   prochlorperazine (COMPAZINE) 10 mg tablet Take 1 tablet (10 mg total) by mouth every 6 (six) hours as needed for nausea or vomiting (migraine) 10/18/19   Naomi Gillis PA-C   ranolazine (RANEXA) 1000 MG SR tablet Take 1 tablet (1,000 mg total) by mouth every 12 (twelve) hours 8/21/23 9/20/23  Sindhu Argueta MD   sertraline (ZOLOFT) 100 mg tablet Take 1.5 tablets (150 mg total) by mouth daily  Patient not taking: Reported on 8/11/2021 1/31/20   Faby Cowart MD   sucralfate (CARAFATE) 1 g/10 mL suspension Take 10 mL (1 g total) by mouth 4 (four) times a day 9/8/21   Parmjit Varghese MD   tamsulosin (FLOMAX) 0.4 mg Take 0.4 mg by mouth daily with dinner. Historical Provider, MD   traZODone (DESYREL) 100 mg tablet Take 2 tablets (200 mg total) by mouth daily at bedtime as needed for sleep  Patient not taking: Reported on 8/11/2021 1/31/20   Faby Cowart MD   ULTICARE SHORT PEN NEEDLES 31G X 8 MM MISC 4 INJECTIONS PER DAY DX  E11.8 8/28/18   Historical Provider, MD [place] have reviewed home medications with patient personally.     Allergies: No Known Allergies    Social History:     Marital Status: /Civil Union   Occupation:   Patient Pre-hospital Living Situation: home  Patient Pre-hospital Level of Mobility: amb  Patient Pre-hospital Diet Restrictions: none  Substance Use History:   Social History     Substance and Sexual Activity   Alcohol Use Not Currently     Social History     Tobacco Use   Smoking Status Never   Smokeless Tobacco Never     Social History     Substance and Sexual Activity   Drug Use Not Currently   • Types: Marijuana       Family History:    Family History   Problem Relation Age of Onset   • Diabetes unspecified Mother    • Diabetes unspecified Brother • Diabetes unspecified Paternal Uncle    • Diabetes unspecified Maternal Grandmother    • Diabetes unspecified Paternal Grandmother    • Diabetes unspecified Brother    • Heart attack Father        Physical Exam:     Vitals:   Blood Pressure: 152/74 (08/23/23 1500)  Pulse: 66 (08/23/23 1500)  Temperature: 98.4 °F (36.9 °C) (08/23/23 1137)  Temp Source: Oral (08/23/23 1137)  Respirations: 19 (08/23/23 1500)  SpO2: 95 % (08/23/23 1500)    Physical Exam  Vitals and nursing note reviewed. Constitutional:       Appearance: He is obese. HENT:      Head: Normocephalic. Eyes:      Conjunctiva/sclera: Conjunctivae normal.   Cardiovascular:      Rate and Rhythm: Normal rate. Pulmonary:      Effort: Pulmonary effort is normal. No respiratory distress. Abdominal:      General: Bowel sounds are normal. There is no distension. Palpations: Abdomen is soft. Musculoskeletal:         General: No swelling. Right lower leg: No edema. Left lower leg: No edema. Skin:     General: Skin is warm. Neurological:      Mental Status: He is alert. Mental status is at baseline. Additional Data:     Lab Results: I have personally reviewed pertinent reports. Results from last 7 days   Lab Units 08/23/23  1152   WBC Thousand/uL 10.83*   HEMOGLOBIN g/dL 15.0   HEMATOCRIT % 46.9   PLATELETS Thousands/uL 235   NEUTROS PCT % 68   LYMPHS PCT % 19   MONOS PCT % 7   EOS PCT % 4     Results from last 7 days   Lab Units 08/23/23  1152   SODIUM mmol/L 138   POTASSIUM mmol/L 4.4   CHLORIDE mmol/L 110*   CO2 mmol/L 20*   BUN mg/dL 10   CREATININE mg/dL 1.18   ANION GAP mmol/L 8   CALCIUM mg/dL 8.6   ALBUMIN g/dL 3.8   TOTAL BILIRUBIN mg/dL 0.60   ALK PHOS U/L 63   ALT U/L 27   AST U/L 38   GLUCOSE RANDOM mg/dL 109                       Imaging: I have personally reviewed pertinent reports. XR chest 1 view portable   Final Result by Sivakumar Quintero DO (08/23 4933)      No acute cardiopulmonary disease. Resident: Marietta Culp, the attending radiologist, have reviewed the images and agree with the final report above. Workstation performed: [id number]             EKG, Pathology, and Other Studies Reviewed on Admission:   · EKG: NSR, ST depression on lateral leads     Allscripts / Epic Records Reviewed: Yes     ** Please Note: This note has been constructed using a voice recognition system.  **

## 2023-08-23 NOTE — EMTALA/ACUTE CARE TRANSFER
[phone] [place] [address]  Dept: [phone]      ACUTE CARE TRANSFER CONSENT    NAME Prema HANSEN 1960                              MRN [id number]    I have been informed of my rights regarding examination, treatment, and transfer   by Dr. Rosalin Essex, MD    Benefits: Specialized equipment and/or services available at the receiving facility (Include comment)________________________ (Cardiac MRI)    Risks: Potential for delay in receiving treatment, Potential deterioration of medical condition, Loss of IV, Possible worsening of condition or death during transfer, Increased discomfort during transfer      Consent for Transfer:  I acknowledge that my medical condition has been evaluated and explained to me by the treating physician or other qualified medical person and/or my attending physician, who has recommended that I be transferred to the service of  Accepting Physician: Dr. Cruz Guallpa [facility] [address] [place] [address] [address] Name, [address] : [place] [facility]. The above potential benefits of such transfer, the potential risks associated with such transfer, and the probable risks of not being transferred have been explained to me, and I fully understand them. The doctor has explained that, in my case, the benefits of transfer outweigh the risks. I agree to be transferred. I authorize the performance of emergency medical procedures and treatments upon me in both transit and upon arrival at the receiving facility. Additionally, I authorize the release of any and all medical records to the receiving facility and request they be transported with me, if possible. I understand that the safest mode of transportation during a medical emergency is an ambulance and that the Hospital advocates the use of this mode of transport.  Risks of traveling to the receiving facility by car, including absence of medical control, life sustaining equipment, such as oxygen, and medical personnel has been explained to me and I fully understand them. (YOUNG CORRECT BOX BELOW)  [  ]  I consent to the stated transfer and to be transported by ambulance/helicopter. [  ]  I consent to the stated transfer, but refuse transportation by ambulance and accept full responsibility for my transportation by car. I understand the risks of non-ambulance transfers and I exonerate the Hospital and its staff from any deterioration in my condition that results from this refusal.    X___________________________________________    DATE  23  TIME________  Signature of patient or legally responsible individual signing on patient behalf           RELATIONSHIP TO PATIENT_________________________          Provider Certification    NAME Roger Walter                                         1960                              MRN [id number]    A medical screening exam was performed on the above named patient.   Based on the examination:    Condition Necessitating Transfer Need for Cardiac MRI    Patient Condition: The patient has been stabilized such that within reasonable medical probability, no material deterioration of the patient condition or the condition of the unborn child(jc) is likely to result from the transfer    Reason for Transfer: Level of Care needed not available at this facility (Cardiac MRI)    Transfer Requirements: [address] [place] [address]   · Space available and qualified personnel available for treatment as acknowledged by Radha Hicks  · Agreed to accept transfer and to provide appropriate medical treatment as acknowledged by       Dr. Arturo Najjar [facility]  · Appropriate medical records of the examination and treatment of the patient are provided at the time of transfer   [address] _______  · Transfer will be performed by qualified personnel from    and appropriate transfer equipment as required, including the use of necessary and appropriate life support measures. Provider Certification: I have examined the patient and explained the following risks and benefits of being transferred/refusing transfer to the patient/family:  General risk, such as traffic hazards, adverse weather conditions, rough terrain or turbulence, possible failure of equipment (including vehicle or aircraft), or consequences of actions of persons outside the control of the transport personnel, Unanticipated needs of medical equipment and personnel during transport, The possibility of a transport vehicle being unavailable, Risk of worsening condition      Based on these reasonable risks and benefits to the patient and/or the unborn child(jc), and based upon the information available at the time of the patient’s examination, I certify that the medical benefits reasonably to be expected from the provision of appropriate medical treatments at another medical facility outweigh the increasing risks, if any, to the individual’s medical condition, and in the case of labor to the unborn child, from effecting the transfer.     X____________________________________________ DATE 08/23/23        TIME_______      ORIGINAL - SEND TO MEDICAL RECORDS   COPY - SEND WITH PATIENT DURING TRANSFER

## 2023-08-23 NOTE — PLAN OF CARE
Problem: Potential for Falls  Goal: Patient will remain free of falls  Description: INTERVENTIONS:  - Educate patient/family on patient safety including physical limitations  - Instruct patient to call for assistance with activity   - Consult OT/PT to assist with strengthening/mobility   - Keep Call bell within reach  - Keep bed low and locked with side rails adjusted as appropriate  - Keep care items and personal belongings within reach  - Initiate and maintain comfort rounds  - Make Fall Risk Sign visible to staff  - Offer Toileting every 2 Hours, in advance of need  - Initiate/Maintain BED alarm  - Obtain necessary fall risk management equipment: ALARM  - Apply yellow socks and bracelet for high fall risk patients  - Consider moving patient to room near nurses station  Outcome: Progressing     Problem: NEUROSENSORY - ADULT  Goal: Achieves stable or improved neurological status  Description: INTERVENTIONS  - Monitor and report changes in neurological status  - Monitor vital signs such as temperature, blood pressure, glucose, and any other labs ordered   - Initiate measures to prevent increased intracranial pressure  - Monitor for seizure activity and implement precautions if appropriate      Outcome: Progressing  Goal: Remains free of injury related to seizures activity  Description: INTERVENTIONS  - Maintain airway, patient safety  and administer oxygen as ordered  - Monitor patient for seizure activity, document and report duration and description of seizure to physician/advanced practitioner  - If seizure occurs,  ensure patient safety during seizure  - Reorient patient post seizure  - Seizure pads on all 4 side rails  - Instruct patient/family to notify RN of any seizure activity including if an aura is experienced  - Instruct patient/family to call for assistance with activity based on nursing assessment  - Administer anti-seizure medications if ordered    Outcome: Progressing  Goal: Achieves maximal functionality and self care  Description: INTERVENTIONS  - Monitor swallowing and airway patency with patient fatigue and changes in neurological status  - Encourage and assist patient to increase activity and self care.    - Encourage visually impaired, hearing impaired and aphasic patients to use assistive/communication devices  Outcome: Progressing     Problem: DISCHARGE PLANNING  Goal: Discharge to home or other facility with appropriate resources  Description: INTERVENTIONS:  - Identify barriers to discharge w/patient and caregiver  - Arrange for needed discharge resources and transportation as appropriate  - Identify discharge learning needs (meds, wound care, etc.)  - Arrange for interpretive services to assist at discharge as needed  - Refer to Case Management Department for coordinating discharge planning if the patient needs post-hospital services based on physician/advanced practitioner order or complex needs related to functional status, cognitive ability, or social support system  Outcome: Progressing

## 2023-08-24 ENCOUNTER — APPOINTMENT (INPATIENT)
Dept: NON INVASIVE DIAGNOSTICS | Facility: HOSPITAL | Age: 63
DRG: 281 | End: 2023-08-24
Payer: MEDICARE

## 2023-08-24 LAB
ANION GAP SERPL CALCULATED.3IONS-SCNC: 7 MMOL/L
AORTIC VALVE MEAN VELOCITY: 12 M/S
APICAL FOUR CHAMBER EJECTION FRACTION: 55 %
ATRIAL RATE: 71 BPM
ATRIAL RATE: 74 BPM
AV AREA BY CONTINUOUS VTI: 3.1 CM2
AV AREA PEAK VELOCITY: 2.8 CM2
AV LVOT MEAN GRADIENT: 3 MMHG
AV LVOT PEAK GRADIENT: 5 MMHG
AV MEAN GRADIENT: 6 MMHG
AV PEAK GRADIENT: 10 MMHG
AV VALVE AREA: 3.07 CM2
AV VELOCITY RATIO: 0.74
BASOPHILS # BLD AUTO: 0.05 THOUSANDS/ÂΜL (ref 0–0.1)
BASOPHILS NFR BLD AUTO: 0 % (ref 0–1)
BUN SERPL-MCNC: 13 MG/DL (ref 5–25)
CALCIUM SERPL-MCNC: 8.3 MG/DL (ref 8.4–10.2)
CHLORIDE SERPL-SCNC: 108 MMOL/L (ref 96–108)
CO2 SERPL-SCNC: 23 MMOL/L (ref 21–32)
CREAT SERPL-MCNC: 1.21 MG/DL (ref 0.6–1.3)
DOP CALC AO PEAK VEL: 1.56 M/S
DOP CALC AO VTI: 29.79 CM
DOP CALC LVOT AREA: 3.8 CM2
DOP CALC LVOT CARDIAC INDEX: 2.18 L/MIN/M2
DOP CALC LVOT CARDIAC OUTPUT: 5.51 L/MIN
DOP CALC LVOT DIAMETER: 2.2 CM
DOP CALC LVOT PEAK VEL VTI: 24.06 CM
DOP CALC LVOT PEAK VEL: 1.15 M/S
DOP CALC LVOT STROKE INDEX: 35.6 ML/M2
DOP CALC LVOT STROKE VOLUME: 91.41 CM3
E WAVE DECELERATION TIME: 168 MS
EOSINOPHIL # BLD AUTO: 0.5 THOUSAND/ÂΜL (ref 0–0.61)
EOSINOPHIL NFR BLD AUTO: 4 % (ref 0–6)
ERYTHROCYTE [DISTWIDTH] IN BLOOD BY AUTOMATED COUNT: 13.3 % (ref 11.6–15.1)
GFR SERPL CREATININE-BSD FRML MDRD: 64 ML/MIN/1.73SQ M
GLUCOSE SERPL-MCNC: 106 MG/DL (ref 65–140)
GLUCOSE SERPL-MCNC: 113 MG/DL (ref 65–140)
GLUCOSE SERPL-MCNC: 117 MG/DL (ref 65–140)
GLUCOSE SERPL-MCNC: 119 MG/DL (ref 65–140)
HCT VFR BLD AUTO: 42.8 % (ref 36.5–49.3)
HGB BLD-MCNC: 13.3 G/DL (ref 12–17)
IMM GRANULOCYTES # BLD AUTO: 0.09 THOUSAND/UL (ref 0–0.2)
IMM GRANULOCYTES NFR BLD AUTO: 1 % (ref 0–2)
LEFT ATRIUM AREA SYSTOLE SINGLE PLANE A4C: 17.4 CM2
LYMPHOCYTES # BLD AUTO: 1.97 THOUSANDS/ÂΜL (ref 0.6–4.47)
LYMPHOCYTES NFR BLD AUTO: 17 % (ref 14–44)
MAGNESIUM SERPL-MCNC: 2.1 MG/DL (ref 1.9–2.7)
MCH RBC QN AUTO: 29.7 PG (ref 26.8–34.3)
MCHC RBC AUTO-ENTMCNC: 31.1 G/DL (ref 31.4–37.4)
MCV RBC AUTO: 96 FL (ref 82–98)
MONOCYTES # BLD AUTO: 0.8 THOUSAND/ÂΜL (ref 0.17–1.22)
MONOCYTES NFR BLD AUTO: 7 % (ref 4–12)
MV PEAK A VEL: 0.72 M/S
MV PEAK E VEL: 69 CM/S
MV STENOSIS PRESSURE HALF TIME: 49 MS
MV VALVE AREA P 1/2 METHOD: 4.49 CM2
NEUTROPHILS # BLD AUTO: 8.11 THOUSANDS/ÂΜL (ref 1.85–7.62)
NEUTS SEG NFR BLD AUTO: 71 % (ref 43–75)
NRBC BLD AUTO-RTO: 0 /100 WBCS
P AXIS: 23 DEGREES
P AXIS: 5 DEGREES
PLATELET # BLD AUTO: 230 THOUSANDS/UL (ref 149–390)
PLATELET # BLD AUTO: 238 THOUSANDS/UL (ref 149–390)
PMV BLD AUTO: 10.1 FL (ref 8.9–12.7)
PMV BLD AUTO: 10.4 FL (ref 8.9–12.7)
POTASSIUM SERPL-SCNC: 4.2 MMOL/L (ref 3.5–5.3)
PR INTERVAL: 176 MS
PR INTERVAL: 190 MS
QRS AXIS: -81 DEGREES
QRS AXIS: -82 DEGREES
QRSD INTERVAL: 136 MS
QRSD INTERVAL: 138 MS
QT INTERVAL: 452 MS
QT INTERVAL: 454 MS
QTC INTERVAL: 493 MS
QTC INTERVAL: 501 MS
RBC # BLD AUTO: 4.48 MILLION/UL (ref 3.88–5.62)
RIGHT ATRIUM AREA SYSTOLE A4C: 21.7 CM2
RIGHT VENTRICLE ID DIMENSION: 4.6 CM
SL CV LV EF: 60
SODIUM SERPL-SCNC: 138 MMOL/L (ref 135–147)
T WAVE AXIS: 21 DEGREES
T WAVE AXIS: 29 DEGREES
TRICUSPID ANNULAR PLANE SYSTOLIC EXCURSION: 2.1 CM
VENTRICULAR RATE: 71 BPM
VENTRICULAR RATE: 74 BPM
WBC # BLD AUTO: 11.52 THOUSAND/UL (ref 4.31–10.16)

## 2023-08-24 PROCEDURE — 85025 COMPLETE CBC W/AUTO DIFF WBC: CPT | Performed by: INTERNAL MEDICINE

## 2023-08-24 PROCEDURE — 93321 DOPPLER ECHO F-UP/LMTD STD: CPT | Performed by: INTERNAL MEDICINE

## 2023-08-24 PROCEDURE — 99232 SBSQ HOSP IP/OBS MODERATE 35: CPT | Performed by: INTERNAL MEDICINE

## 2023-08-24 PROCEDURE — 93308 TTE F-UP OR LMTD: CPT

## 2023-08-24 PROCEDURE — 93010 ELECTROCARDIOGRAM REPORT: CPT | Performed by: INTERNAL MEDICINE

## 2023-08-24 PROCEDURE — 93325 DOPPLER ECHO COLOR FLOW MAPG: CPT

## 2023-08-24 PROCEDURE — 93308 TTE F-UP OR LMTD: CPT | Performed by: INTERNAL MEDICINE

## 2023-08-24 PROCEDURE — 99223 1ST HOSP IP/OBS HIGH 75: CPT | Performed by: INTERNAL MEDICINE

## 2023-08-24 PROCEDURE — 93325 DOPPLER ECHO COLOR FLOW MAPG: CPT | Performed by: INTERNAL MEDICINE

## 2023-08-24 PROCEDURE — 82948 REAGENT STRIP/BLOOD GLUCOSE: CPT

## 2023-08-24 PROCEDURE — 83735 ASSAY OF MAGNESIUM: CPT | Performed by: INTERNAL MEDICINE

## 2023-08-24 PROCEDURE — 85049 AUTOMATED PLATELET COUNT: CPT | Performed by: INTERNAL MEDICINE

## 2023-08-24 PROCEDURE — 93321 DOPPLER ECHO F-UP/LMTD STD: CPT

## 2023-08-24 PROCEDURE — 93005 ELECTROCARDIOGRAM TRACING: CPT

## 2023-08-24 PROCEDURE — 80048 BASIC METABOLIC PNL TOTAL CA: CPT | Performed by: INTERNAL MEDICINE

## 2023-08-24 RX ORDER — HYDROMORPHONE HYDROCHLORIDE 2 MG/1
6 TABLET ORAL EVERY 4 HOURS PRN
Status: DISCONTINUED | OUTPATIENT
Start: 2023-08-24 | End: 2023-08-25

## 2023-08-24 RX ORDER — HYDROMORPHONE HYDROCHLORIDE 2 MG/1
4 TABLET ORAL EVERY 4 HOURS PRN
Status: DISCONTINUED | OUTPATIENT
Start: 2023-08-24 | End: 2023-08-25

## 2023-08-24 RX ORDER — KETOROLAC TROMETHAMINE 30 MG/ML
15 INJECTION, SOLUTION INTRAMUSCULAR; INTRAVENOUS ONCE
Status: DISCONTINUED | OUTPATIENT
Start: 2023-08-24 | End: 2023-08-24

## 2023-08-24 RX ORDER — IBUPROFEN 600 MG/1
600 TABLET ORAL EVERY 8 HOURS PRN
Status: DISCONTINUED | OUTPATIENT
Start: 2023-08-24 | End: 2023-08-24

## 2023-08-24 RX ORDER — ACETAMINOPHEN 325 MG/1
975 TABLET ORAL EVERY 8 HOURS SCHEDULED
Status: DISCONTINUED | OUTPATIENT
Start: 2023-08-24 | End: 2023-08-31 | Stop reason: HOSPADM

## 2023-08-24 RX ORDER — MORPHINE SULFATE 15 MG/1
30 TABLET ORAL EVERY 8 HOURS
Status: DISCONTINUED | OUTPATIENT
Start: 2023-08-24 | End: 2023-08-25

## 2023-08-24 RX ORDER — HYDROMORPHONE HCL/PF 1 MG/ML
1 SYRINGE (ML) INJECTION ONCE
Status: COMPLETED | OUTPATIENT
Start: 2023-08-24 | End: 2023-08-24

## 2023-08-24 RX ORDER — COLCHICINE 0.6 MG/1
0.6 TABLET ORAL 2 TIMES DAILY
Status: DISCONTINUED | OUTPATIENT
Start: 2023-08-24 | End: 2023-08-25

## 2023-08-24 RX ORDER — IBUPROFEN 600 MG/1
600 TABLET ORAL EVERY 8 HOURS SCHEDULED
Status: DISCONTINUED | OUTPATIENT
Start: 2023-08-24 | End: 2023-08-25

## 2023-08-24 RX ORDER — HYDROMORPHONE HCL/PF 1 MG/ML
1 SYRINGE (ML) INJECTION
Status: DISCONTINUED | OUTPATIENT
Start: 2023-08-24 | End: 2023-08-26

## 2023-08-24 RX ORDER — KETOROLAC TROMETHAMINE 30 MG/ML
30 INJECTION, SOLUTION INTRAMUSCULAR; INTRAVENOUS ONCE
Status: COMPLETED | OUTPATIENT
Start: 2023-08-24 | End: 2023-08-24

## 2023-08-24 RX ADMIN — HYDROMORPHONE HYDROCHLORIDE 6 MG: 2 TABLET ORAL at 16:53

## 2023-08-24 RX ADMIN — GABAPENTIN 600 MG: 300 CAPSULE ORAL at 08:36

## 2023-08-24 RX ADMIN — ISOSORBIDE MONONITRATE 60 MG: 60 TABLET, EXTENDED RELEASE ORAL at 08:37

## 2023-08-24 RX ADMIN — ENOXAPARIN SODIUM 40 MG: 40 INJECTION SUBCUTANEOUS at 08:36

## 2023-08-24 RX ADMIN — ACETAMINOPHEN 975 MG: 325 TABLET, FILM COATED ORAL at 17:27

## 2023-08-24 RX ADMIN — BACLOFEN 10 MG: 10 TABLET ORAL at 08:37

## 2023-08-24 RX ADMIN — CARVEDILOL 12.5 MG: 12.5 TABLET, FILM COATED ORAL at 08:37

## 2023-08-24 RX ADMIN — COLCHICINE 0.6 MG: 0.6 TABLET ORAL at 12:44

## 2023-08-24 RX ADMIN — COLCHICINE 0.6 MG: 0.6 TABLET ORAL at 17:01

## 2023-08-24 RX ADMIN — MORPHINE SULFATE 30 MG: 15 TABLET ORAL at 12:44

## 2023-08-24 RX ADMIN — KETOROLAC TROMETHAMINE 30 MG: 30 INJECTION, SOLUTION INTRAMUSCULAR; INTRAVENOUS at 12:17

## 2023-08-24 RX ADMIN — GABAPENTIN 600 MG: 300 CAPSULE ORAL at 16:13

## 2023-08-24 RX ADMIN — HYDROMORPHONE HYDROCHLORIDE 1 MG: 1 INJECTION, SOLUTION INTRAMUSCULAR; INTRAVENOUS; SUBCUTANEOUS at 20:26

## 2023-08-24 RX ADMIN — MORPHINE SULFATE 30 MG: 15 TABLET ORAL at 20:06

## 2023-08-24 RX ADMIN — RANOLAZINE 1000 MG: 500 TABLET, FILM COATED, EXTENDED RELEASE ORAL at 20:06

## 2023-08-24 RX ADMIN — MORPHINE SULFATE 30 MG: 15 TABLET ORAL at 04:08

## 2023-08-24 RX ADMIN — PANTOPRAZOLE SODIUM 40 MG: 40 TABLET, DELAYED RELEASE ORAL at 05:27

## 2023-08-24 RX ADMIN — BACLOFEN 10 MG: 10 TABLET ORAL at 20:06

## 2023-08-24 RX ADMIN — TAMSULOSIN HYDROCHLORIDE 0.4 MG: 0.4 CAPSULE ORAL at 16:12

## 2023-08-24 RX ADMIN — BACLOFEN 10 MG: 10 TABLET ORAL at 16:13

## 2023-08-24 RX ADMIN — GABAPENTIN 600 MG: 300 CAPSULE ORAL at 20:06

## 2023-08-24 RX ADMIN — AMLODIPINE BESYLATE 5 MG: 5 TABLET ORAL at 08:37

## 2023-08-24 RX ADMIN — IBUPROFEN 600 MG: 600 TABLET, FILM COATED ORAL at 21:05

## 2023-08-24 RX ADMIN — FOLIC ACID 1 MG: 1 TABLET ORAL at 08:37

## 2023-08-24 RX ADMIN — RANOLAZINE 1000 MG: 500 TABLET, FILM COATED, EXTENDED RELEASE ORAL at 08:36

## 2023-08-24 RX ADMIN — HYDROMORPHONE HYDROCHLORIDE 6 MG: 2 TABLET ORAL at 16:13

## 2023-08-24 RX ADMIN — ATORVASTATIN CALCIUM 80 MG: 80 TABLET, FILM COATED ORAL at 17:01

## 2023-08-24 RX ADMIN — ASPIRIN 81 MG CHEWABLE TABLET 81 MG: 81 TABLET CHEWABLE at 08:36

## 2023-08-24 RX ADMIN — ONDANSETRON 4 MG: 2 INJECTION INTRAMUSCULAR; INTRAVENOUS at 17:27

## 2023-08-24 RX ADMIN — IBUPROFEN 600 MG: 600 TABLET, FILM COATED ORAL at 16:13

## 2023-08-24 RX ADMIN — CLOPIDOGREL BISULFATE 75 MG: 75 TABLET ORAL at 08:37

## 2023-08-24 RX ADMIN — HYDROMORPHONE HYDROCHLORIDE 1 MG: 1 INJECTION, SOLUTION INTRAMUSCULAR; INTRAVENOUS; SUBCUTANEOUS at 14:04

## 2023-08-24 RX ADMIN — CARVEDILOL 12.5 MG: 12.5 TABLET, FILM COATED ORAL at 16:13

## 2023-08-24 RX ADMIN — NITROGLYCERIN 0.4 MG: 0.4 TABLET SUBLINGUAL at 11:59

## 2023-08-24 RX ADMIN — HYDROMORPHONE HYDROCHLORIDE 6 MG: 2 TABLET ORAL at 23:19

## 2023-08-24 NOTE — ASSESSMENT & PLAN NOTE
Patient presented with recurrent chest pain. Recent discharge from [facility] on 8/21/23  with Recent cardiac catheterization that did not reveal any critical lesions and medical management was recommended. Patient was evaluated by cardiology at [facility]  Per cardiology EKG with some anterior ST depressions on the initial ECG which resolved on subsequent EKGs. His troponins are mildly elevated and flat without significant change.     Cardiology recommended transfer to [facility] for cardiac MRI/ further management to assess for myopericarditis   Continue aspirin, statin, Plavix, beta-blocker, Ranexa and Imdur  Consult cardiology for further management    8/24/23: Patient is currently pending MRI, agree with cardiology and starting colchicine and NSAIDs  Patient continues to report that the pain is worse when laying flat and improves when sitting upright

## 2023-08-24 NOTE — PROGRESS NOTES
[address] [facility]  Progress Note  Name: Bayron Gupta  MRN: [id number]  Unit/Bed#: [facility]PHP 522-01 I Date of Admission: 8/23/2023   Date of Service: 8/24/2023  Hospital Day: 1    Assessment/Plan   History of gastric sleeve procedure  Assessment & Plan  History of gastric sleeve procedure noted    Major depressive disorder, recurrent, moderate (HCC)  Assessment & Plan  Continue Abilify    Type 2 diabetes mellitus with diabetic neuropathy, with long-term current use of insulin [facility])  Assessment & Plan  Lab Results   Component Value Date    HGBA1C 5.3 07/14/2022       Recent Labs     08/23/23  2316 08/24/23  0546 08/24/23  1049   POCGLU 113 117 106       Blood Sugar Average: Last 72 hrs:  (P) 112   Not on  meds at home  Monitor    Opioid dependence, continuous (HCC)  Assessment & Plan  • Chronic pain syndrome  • Morphine administration thru intrathecal pain pump  • We will continue scheduled oral morphine, as well as as needed oral and IV Dilaudid  •     GERD (gastroesophageal reflux disease)  Assessment & Plan  Continue PPI    CVA (cerebral vascular accident) [facility])  Assessment & Plan  History of CVA in 2005  Continue aspirin and statin    Morbid obesity ([address])  Assessment & Plan  With history of prior gastric sleeve    * Chest pain  Assessment & Plan  Patient presented with recurrent chest pain. [facility]  with Recent cardiac catheterization that did not reveal any critical lesions and medical management was recommended. Patient was evaluated by cardiology at [facility]  Per cardiology EKG with some anterior ST depressions on the initial ECG which resolved on subsequent EKGs. His troponins are mildly elevated and flat without significant change.     Cardiology recommended transfer to [facility] for cardiac MRI/ further management to assess for myopericarditis   Continue aspirin, statin, Plavix, beta-blocker, Ranexa and [facility] cardiology for further management    23: Patient is currently pending MRI, agree with cardiology and starting colchicine and NSAIDs  Patient continues to report that the pain is worse when laying flat and improves when sitting upright                 VTE Pharmacologic Prophylaxis: VTE Score: 6 High Risk (Score >/= 5) - Pharmacological DVT Prophylaxis Ordered: enoxaparin (Lovenox). Sequential Compression Devices Ordered. Patient Centered Rounds: I performed bedside rounds with nursing staff today. Discussions with Specialists or Other Care Team Provider: cards    Education and Discussions with Family / Patient: Updated  (significant other) at bedside. Total Time Spent on Date of Encounter in care of patient: 35 minutes This time was spent on one or more of the following: performing physical exam; counseling and coordination of care; obtaining or reviewing history; documenting in the medical record; reviewing/ordering tests, medications or procedures; communicating with other healthcare professionals and discussing with patient's family/caregivers. Current Length of Stay: 1 day(s)  Current Patient Status: Inpatient   Certification Statement: The patient will continue to require additional inpatient hospital stay due to Pending cardiac management of chest pain  Discharge Plan: Anticipate discharge in 48 hrs to discharge location to be determined pending rehab evaluations.     Code Status: Level 1 - Full Code    Subjective:   Patient reports chest pain that is worse when laying flat, worse with taking a deep breath and improved when sitting upright, currently denies any chest pain during my evaluation but does have intermittent episodes, also reporting back pain and ineffective pain control due to recent adjustments in his intrathecal pump    Objective:     Vitals:   Temp (24hrs), Av.3 °F (36.8 °C), Min:96.9 °F (36.1 °C), Max:98.8 °F (37.1 °C)    Temp:  [96.9 °F (36.1 °C)-98.8 °F (37.1 °C)] 98.7 °F (37.1 °C)  HR:  [58-80] 58  Resp:  [18-20] 18  BP: (114-151)/(64-90) 143/72  SpO2:  [89 %-96 %] 93 %  Body mass index is 40.82 kg/m². Input and Output Summary (last 24 hours): Intake/Output Summary (Last 24 hours) at 8/24/2023 1731  Last data filed at 8/24/2023 1430  Gross per 24 hour   Intake 600 ml   Output 0 ml   Net 600 ml       Physical Exam:   Physical Exam  Vitals reviewed. Constitutional:       Appearance: Normal appearance. He is obese. He is not ill-appearing. HENT:      Head: Normocephalic and atraumatic. Mouth/Throat:      Mouth: Mucous membranes are moist.      Pharynx: No oropharyngeal exudate. Eyes:      General: No scleral icterus. Extraocular Movements: Extraocular movements intact. Cardiovascular:      Rate and Rhythm: Normal rate and regular rhythm. Pulses: Normal pulses. Heart sounds: Normal heart sounds. No murmur heard. Pulmonary:      Effort: Pulmonary effort is normal. No respiratory distress. Breath sounds: Normal breath sounds. No wheezing. Abdominal:      General: Bowel sounds are normal. There is no distension. Palpations: Abdomen is soft. Tenderness: There is no abdominal tenderness. Comments: Left lower quadrant morphine pump in place   Musculoskeletal:         General: Normal range of motion. Cervical back: Normal range of motion and neck supple. Right lower leg: No edema. Left lower leg: No edema. Skin:     General: Skin is warm and dry. Neurological:      General: No focal deficit present. Mental Status: He is alert and oriented to person, place, and time. Cranial Nerves: No cranial nerve deficit.    Psychiatric:         Mood and Affect: Mood normal.          Additional Data:     Labs:  Results from last 7 days   Lab Units 08/24/23  0536   WBC Thousand/uL 11.52*   HEMOGLOBIN g/dL 13.3   HEMATOCRIT % 42.8   PLATELETS Thousands/uL 230   NEUTROS PCT % 71   LYMPHS PCT % 17 MONOS PCT % 7   EOS PCT % 4     Results from last 7 days   Lab Units 08/24/23  0536 08/23/23  1152   SODIUM mmol/L 138 138   POTASSIUM mmol/L 4.2 4.4   CHLORIDE mmol/L 108 110*   CO2 mmol/L 23 20*   BUN mg/dL 13 10   CREATININE mg/dL 1.21 1.18   ANION GAP mmol/L 7 8   CALCIUM mg/dL 8.3* 8.6   ALBUMIN g/dL  --  3.8   TOTAL BILIRUBIN mg/dL  --  0.60   ALK PHOS U/L  --  63   ALT U/L  --  27   AST U/L  --  38   GLUCOSE RANDOM mg/dL 119 109         Results from last 7 days   Lab Units 08/24/23  1049 08/24/23  0546 08/23/23  2316   POC GLUCOSE mg/dl 106 117 113               Lines/Drains:  Invasive Devices     Peripheral Intravenous Line  Duration           Peripheral IV 08/23/23 Left Antecubital 1 day                  Telemetry:  Telemetry Orders (From admission, onward)             24 Hour Telemetry Monitoring  Continuous x 24 Hours (Telem)        Question:  Reason for 24 Hour Telemetry  Answer:  Patients with talib/shae/endocarditis; cardiac contusion                              Imaging: No pertinent imaging reviewed.     Recent Cultures (last 7 days):         Last 24 Hours Medication List:   Current Facility-Administered Medications   Medication Dose Route Frequency Provider Last Rate   • acetaminophen  975 mg Oral Q8H [facility] Samy Myles, DO     • amLODIPine  5 mg Oral Daily Alo Sages, DO     • ARIPiprazole  10 mg Oral Daily Alo Sages, DO     • aspirin  81 mg Oral Daily Alo Sages, DO     • atorvastatin  80 mg Oral After Wynelle Mortimer, DO     • baclofen  10 mg Oral TID Alo Simons, DO     • carvedilol  12.5 mg Oral BID With Meals Alo Holguin, DO     • clopidogrel  75 mg Oral Daily Alo Sages, DO     • colchicine  0.6 mg Oral BID Emery Cantor, DO     • enoxaparin  40 mg Subcutaneous Daily Alo Sages, DO     • folic acid  1 mg Oral Daily Alo Sages, DO     • gabapentin  600 mg Oral TID Alo Simons, DO     • HYDROmorphone  1 mg Intravenous Q3H PRN Samy Myles, DO     • HYDROmorphone  4 mg Oral Q4H PRN Lorraine Ferrari DO      Or   • HYDROmorphone  6 mg Oral Q4H PRN Samy Myles DO     • ibuprofen  600 mg Oral Q8H [address] Samy Myles, DO     • insulin lispro  1-5 Units Subcutaneous TID AC Krzysztof Mojica DO     • isosorbide mononitrate  60 mg Oral Daily Krzysztof Mojica DO     • methocarbamol  750 mg Oral Q6H PRN Krzysztof Mojica, DO     • morphine  30 mg Oral Q8H Samy Myles, DO     • nitroglycerin  0.4 mg Sublingual Q5 Min PRN Krzysztof Mojica DO     • ondansetron  4 mg Intravenous Q6H PRN Krzysztof Mojica, DO     • pantoprazole  40 mg Oral Early Morning Krzysztof Mojica DO     • ranolazine  1,000 mg Oral Q12H [address] Krzysztof Mojica, DO     • tamsulosin  0.4 mg Oral Daily With Nerissa Regalado DO          Today, Patient Was Seen By: Lorraine Ferrari DO    **Please Note: This note may have been constructed using a voice recognition system. **

## 2023-08-24 NOTE — CONSULTS
Consultation - General Cardiology Team 2  Lacie Ramirez 63 y.o. male MRN: [id number]  Unit/Bed#: [address] 522-01 Encounter: [id number]      Consults  PCP: Yen Magdaleno MD   Outpatient Cardiologist: Lety Oliva MD [facility]    History of Present Illness   Physician Requesting Consult: Rachell Breaux DO  Reason for Consult / Principal Problem: Chest pain    HPI: Lacie Ramirez is a 63y.o. year old male with a history of prediabetes, recent NSTEMI, chronic pain syndrome on opioid pump presents as a transfer from [facility] for possible cardiac imaging work-up. The patient was recently discharged from [address] [place] [address] on August 21, 2023 after undergoing an ischemic work-up including a left heart catheterization following elevated troponins of 17,000 and peak and concurrent chest pain. His stent at that time showed no culprit lesions however 50% stenotic LAD region that would possible requiring intervention at a later time, however the interventionalists noted that the patient has a history of medication noncompliance and was not comfortable with stenting and if he was not able to take his dual antiplatelet therapy medicine. Patient was optimized on medical therapy and discharged. He subsequently presented to [facility] with chest pain that was unrelieved with any intervention and states this is new since his cardiac catheterization. He complains of shortness of breath as well as chest pain. Repeat troponins done at [facility] show 4 94-5 33 with a delta 4-hour change of 36. This is a notable downtrend from his recent elevated troponins from his recent previous hospital stay at [facility]. He was optimized on a cardiac medication regimen including Imdur 60 mg, Plavix 75 mg, aspirin 81 mg, Norvasc 5 mg, Coreg 12.5mg  twice daily, atorvastatin high intensity 80 mg, Ranexa 100 mg twice daily.   Cardiology at [facility] suspected myocardial involvement and recommended transfer to [facility] for evaluation of myopericarditis requiring a cardiac MRI scan that is unavailable to them at [address] [facility]. In the ED at [facility] objective findings include unchanged EKG findings negative chest x-ray unimpressive laboratory findings normal vital signs blood pressure 151/90. Patient's stent history includes a mid RCA stent in 2017, mid LAD stent 2020, mid left circumflex stent 2015, proximal LAD stent 2022. Assessment and Plan:     1. Chest pain  · Patient transferred from [address] [place] [address] for cardiac MRI imaging and work-up for suspicious myopericarditis following recent left heart cath showed multiple vessel disease but no intervention due to patient medication compliance issues however no acute culprit lesions identified. On physical exam findings are very suspicious for pericardial agitation including worsening of the pain when lying flat and taking a deep breath. Recent troponins are downtrending however suspect possible myocardial inflammation. · We will get a new limited echocardiogram to reassess the moderate global hypokinesis and reduced ejection fraction shown on previous echo shown on 6/2023. · We will order cardiac MRI to evaluate the etiology of this pain as well as rule out or confirm the presence of myopericarditis. · We will treat the patient with 0.6 mg colchicine twice daily as well as one-time Toradol administration 30 mg IV. · ESR CRP labs ordered    · Ibuprofen 600 mg every 3 ordered    · We will follow closely and reassess this afternoon. Subjective:   Pt seen and examined at bedside. Patient is uncomfortable appearing and states that his chest tightness is very frustrating and he is seeking pain relief and any intervention to make the uncomfortability of the chest discomfort go away. Patient notes that his pain is worse when lying flat. Review of Systems   Constitutional: Positive for diaphoresis.  Negative for chills, fever and unexpected weight change. HENT: Negative for congestion and sore throat. Respiratory: Positive for chest tightness and shortness of breath. Cardiovascular: Positive for chest pain. Negative for palpitations and leg swelling. Neurological: Positive for headaches. Psychiatric/Behavioral: The patient is nervous/anxious. ROS as noted above.        Historical Information   Past Medical History:   Diagnosis Date   • Acute on chronic diastolic congestive heart failure (HCC)    • Altered gait    • Alzheimer disease ([facility])     per patients,,early onset    • Angina pectoris ([facility])    • Anxiety    • Arthritis    • Brain aneurysm     coils placed   • Cardiac disease    • Chest pain 1/13/2016   • Chronic kidney disease    • Chronic pain     back/ right groin and rle- has morphine pump   • Constipation    • COPD (chronic obstructive pulmonary disease) ([facility])    • Coronary artery disease    • CPAP (continuous positive airway pressure) dependence    • Decubital ulcer     sacral decub-occured 5/2019-sees wound care/debide in OR today 6/6/2019   • Dependent on walker for ambulation     w/c for long distance   • Depression    • Diabetes mellitus ([facility])     insulin dependent   • Dizziness     occ   • Dysphagia    • Enlarged prostate    • Fall    • GERD (gastroesophageal reflux disease)    • Heart failure ([address])    • Hiatal hernia    • Hx of gastric bypass 11/19/2018   • Hypercholesterolemia    • Hypertension    • MI (myocardial infarction) ([address])     2017- stents x2   • Migraine    • Morbid obesity ([facility])     gastric bypass sleeve 11/2018-wt loss 125 lb   • Neuropathy    • Oxygen dependent     Q HS  2LPM with CPAP and prn during day 2-3 LPM    • Pressure injury of skin    • Renal disorder    • Shortness of breath    • Skin abnormality     sacral wound - covered with pad   • Sleep apnea    • Stented coronary artery    • Stroke [facility])     vision loss b/l  2005, residual R leg weakness   • Urinary frequency    • Use of cane as ambulatory aid    • Wears dentures    • Wears glasses    • Wears glasses    • Wheelchair dependent      Past Surgical History:   Procedure Laterality Date   • BACK SURGERY     • BRAIN SURGERY     • CARDIAC CATHETERIZATION      with stents   • CARDIAC CATHETERIZATION N/A 8/18/2023    Procedure: Cardiac Coronary Angiogram;  Surgeon: Ada Brennan MD;  Location: [facility] CARDI[facility]ATH [facility]; Service: Cardiology   • CEREBRAL ANEURYSM REPAIR      with coils   • COLONOSCOPY     • ESOPHAGOGASTRODUODENOSCOPY N/A 7/1/2016    Procedure: ESOPHAGOGASTRODUODENOSCOPY (EGD); Surgeon: Jeannine Sterling MD;  Location: [facility] GI LAB; Service:    • GASTRIC BYPASS  11/19/2018   • HERNIA REPAIR     • HIATAL HERNIA REPAIR     • INFUSION PUMP IMPLANTATION Left     morphine   • INTRATHECAL PUMP IMPLANTATION Left 7/9/2020    Procedure: REVISION INTRATHECAL PAIN PUMP POCKET, LEFT ABDOMEN;  Surgeon: Sharia Schirmer, MD;  Location:  MAIN OR;  Service: Neurosurgery   • KNEE ARTHROSCOPY Right    • KNEE ARTHROSCOPY Right    • PERONEAL NERVE DECOMPRESSION Right    • NE DEBRIDEMENT OPEN WOUND 20 SQ CM/< N/A 6/6/2019    Procedure: EXCISIONAL DEBRIDEMENT OF SACRAL DECUBITUS ULCER;  Surgeon: Becky Castillo MD;  Location: [facility];  Service: General   • NE ESOPHAGOGASTRODUODENOSCOPY TRANSORAL DIAGNOSTIC N/A 2/27/2017    Procedure: ESOPHAGOGASTRODUODENOSCOPY (EGD); Surgeon: Jeannine Sterling MD;  Location: [facility] GI LAB; Service: Gastroenterology   • NE ESOPHAGOGASTRODUODENOSCOPY TRANSORAL DIAGNOSTIC N/A 8/23/2018    Procedure: ESOPHAGOGASTRODUODENOSCOPY (EGD) with biopsy;  Surgeon: Jeannine Sterling MD;  Location: [facility];   Service: Gastroenterology   • NE IMPLTJ/RPLCMT ITHCL/EDRL DRUG NFS PRGRBL PUMP Left 10/13/2020    Procedure: EXPLORATION OF INTRATHECAL PAIN PUMP SYSTEM INTEGRITY FOR POSSIBLE REPLACEMENT OF CATHETER AND PUMP.;  Surgeon: Sharia Schirmer, MD;  Location:  MAIN OR;  Service: Neurosurgery   • NE IMPLTJ/RPLCMT ITHCL/EDRL DRUG NFS SUBQ RSVR N/A 1/19/2017    Procedure: REMOVAL / EXCHANGE INTRATHECAL PAIN PUMP;  Surgeon: Stephen Clancy MD;  Location: [facility];  Service: Orthopedics   • MI IMPLTJ/RPLCMT ITHCL/EDRL DRUG NFS SUBQ RSVR N/A 5/16/2016    Procedure: REPLACEMENT AND PROGRAM PUMP ;  Surgeon: Stephen Clancy MD;  Location: [facility];  Service: Orthopedics   • MI PRQ IMPLTJ NSTIM ELECTRODE ARRAY EPIDURAL Right 3/17/2021    Procedure: INSERTION THORACIC DORSAL COLUMN SPINAL CORD STIMULATOR PERCUTANEOUS W IMPLANTABLE PULSE GENERATOR, RIGHT;  Surgeon: Ayad Leon MD;  Location: [facility] MAIN OR;  Service: Neurosurgery     Social History     Substance and Sexual Activity   Alcohol Use Not Currently     Social History     Substance and Sexual Activity   Drug Use Not Currently   • Types: Marijuana     Social History     Tobacco Use   Smoking Status Never   Smokeless Tobacco Never     Family History: Father had a heart attack     Meds/Allergies   Hospital Medications:   Current Facility-Administered Medications   Medication Dose Route Frequency   • acetaminophen (TYLENOL) tablet 650 mg  650 mg Oral Q6H PRN   • amLODIPine (NORVASC) tablet 5 mg  5 mg Oral Daily   • ARIPiprazole (ABILIFY) tablet 10 mg  10 mg Oral Daily   • aspirin chewable tablet 81 mg  81 mg Oral Daily   • atorvastatin (LIPITOR) tablet 80 mg  80 mg Oral After Dinner   • baclofen tablet 10 mg  10 mg Oral TID   • carvedilol (COREG) tablet 12.5 mg  12.5 mg Oral BID With Meals   • clopidogrel (PLAVIX) tablet 75 mg  75 mg Oral Daily   • colchicine (COLCRYS) tablet 0.6 mg  0.6 mg Oral BID   • enoxaparin (LOVENOX) subcutaneous injection 40 mg  40 mg Subcutaneous Daily   • folic acid (FOLVITE) tablet 1 mg  1 mg Oral Daily   • gabapentin (NEURONTIN) capsule 600 mg  600 mg Oral TID   • ibuprofen (MOTRIN) tablet 600 mg  600 mg Oral Q8H PRN   • insulin lispro (HumaLOG) 100 units/mL subcutaneous injection 1-5 Units  1-5 Units Subcutaneous TID AC   • isosorbide mononitrate (IMDUR) 24 hr tablet 60 mg  60 mg Oral Daily   • methocarbamol (ROBAXIN) tablet 750 mg  750 mg Oral Q6H PRN   • morphine (MSIR) IR tablet 30 mg  30 mg Oral Q8H   • nitroglycerin (NITROSTAT) SL tablet 0.4 mg  0.4 mg Sublingual Q5 Min PRN   • ondansetron (ZOFRAN) injection 4 mg  4 mg Intravenous Q6H PRN   • pantoprazole (PROTONIX) EC tablet 40 mg  40 mg Oral Early Morning   • ranolazine (RANEXA) 12 hr tablet 1,000 mg  1,000 mg Oral Q12H JOY   • tamsulosin (FLOMAX) capsule 0.4 mg  0.4 mg Oral Daily With Dinner     Home Medications:   Medications Prior to Admission   Medication   • albuterol (ACCUNEB) 1.25 MG/3ML nebulizer solution   • amLODIPine (NORVASC) 5 mg tablet   • ARIPiprazole (ABILIFY) 5 mg tablet   • aspirin 81 mg chewable tablet   • atorvastatin (LIPITOR) 80 mg tablet   • baclofen 10 mg tablet   • CALCIUM PO   • carvedilol (COREG) 12.5 mg tablet   • clopidogrel (PLAVIX) 75 mg tablet   • Cyanocobalamin (VITAMIN B-12 PO)   • ergocalciferol (VITAMIN D2) 84,112 units   • folic acid (FOLVITE) 1 mg tablet   • gabapentin (NEURONTIN) 300 mg capsule   • gabapentin (NEURONTIN) 600 MG tablet   • hydrocortisone 1 % lotion   • hydrOXYzine HCL (ATARAX) 25 mg tablet   • isosorbide mononitrate (IMDUR) 60 mg 24 hr tablet   • memantine (NAMENDA) 10 mg tablet   • methocarbamol (ROBAXIN) 750 mg tablet   • morphine (MSIR) 30 MG tablet   • Morphine Sulfate Microinfusion (MORPHINE SULF MICROINFUSION PF IJ)   • nitroglycerin (NITROSTAT) 0.4 mg SL tablet   • nystatin (MYCOSTATIN) cream   • omeprazole (PriLOSEC) 20 mg delayed release capsule   • omeprazole (PriLOSEC) 40 MG capsule   • ondansetron (ZOFRAN) 4 mg tablet   • Pediatric Multivit-Minerals-C (Polyvitamin/Iron) CHEW   • POTASSIUM PO   • prochlorperazine (COMPAZINE) 10 mg tablet   • ranolazine (RANEXA) 1000 MG SR tablet   • sertraline (ZOLOFT) 100 mg tablet   • sucralfate (CARAFATE) 1 g/10 mL suspension   • tamsulosin (FLOMAX) 0.4 mg   • traZODone (DESYREL) 100 mg tablet   • ULTICARE SHORT PEN NEEDLES 31G X 8 MM MISC       No Known Allergies    Objective   Vitals: Blood pressure 119/71, pulse 64, temperature 98.8 °F (37.1 °C), resp. rate 18, height 6' (1.829 m), weight (!) 137 kg (301 lb), SpO2 (!) 89 %. Orthostatic Blood Pressures    Flowsheet Row Most Recent Value   Blood Pressure 119/71 filed at 08/24/2023 1245   Patient Position - Orthostatic VS Sitting filed at 08/24/2023 0717            Invasive Devices     Peripheral Intravenous Line  Duration           Peripheral IV 08/23/23 Left Antecubital 1 day                Physical Exam  Constitutional:       Appearance: He is obese. He is ill-appearing. Cardiovascular:      Rate and Rhythm: Normal rate and regular rhythm. Pulses: Normal pulses. Heart sounds: Normal heart sounds. No murmur heard. No friction rub. No gallop. Comments: Increased chest tightness while laying flat  Pulmonary:      Effort: Pulmonary effort is normal. No respiratory distress. Breath sounds: Normal breath sounds. No stridor. No wheezing, rhonchi or rales. Skin:     General: Skin is warm and dry. Capillary Refill: Capillary refill takes less than 2 seconds. Neurological:      General: No focal deficit present. Mental Status: He is alert and oriented to person, place, and time. Mental status is at baseline. Psychiatric:         Mood and Affect: Mood normal.         Behavior: Behavior normal.         Thought Content: Thought content normal.         Judgment: Judgment normal.           Lab Results: I have personally reviewed pertinent lab results.         Results from last 7 days   Lab Units 08/24/23  0536 08/23/23  1152 08/19/23  0436   POTASSIUM mmol/L 4.2 4.4 3.6   CO2 mmol/L 23 20* 21   CHLORIDE mmol/L 108 110* 111*   BUN mg/dL 13 10 10   CREATININE mg/dL 1.21 1.18 1.18     Results from last 7 days   Lab Units 08/24/23  0536 08/24/23  0034 08/23/23  1152 08/19/23  0436   HEMOGLOBIN g/dL 13.3  --  15.0 15.2   HEMATOCRIT % 42.8  --  46.9 46.0   PLATELETS Thousands/uL 230 238 235 271     Results from last 7 days   Lab Units 23  0514 23  1240   PTT seconds 74* 54* 51*     Results from last 7 days   Lab Units 23  0436   HDL mg/dL 30*   LDL CALC mg/dL 81   TRIGLYCERIDES mg/dL 130         Imaging: I have personally reviewed pertinent reports. Telemetry:   Reviewed    EKG:   Date: 2023  Interpretation: Normal sinus rhythm with right bundle branch block left axis deviation. Previous STRESS TEST:  No results found for this or any previous visit. No results found for this or any previous visit. No results found for this or any previous visit. Previous Cath/PCI:  Results for orders placed during the hospital encounter of 17    Cardiac catheterization    Narrative  [address] [place] [address]. [address]  [phone]    [facility]    Invasive Cardiovascular Lab Complete Report    Patient: Horacio Waddell  MR number: [id number]  Account number: [de-identified]  Study date: 2017  Gender: Male  : 1960  Height: 74 in  Weight: 371.1 lb  BSA: 2.83 m squared    Allergies: NO KNOWN ALLERGIES    Diagnostic Cardiologist:  Lilliam Chase DO  Interventional Cardiologist:  Lilliam Chase DO  Primary Physician:  Lotus Senior MD    SUMMARY    CORONARY CIRCULATION:  Distal LAD: There was a 95 % stenosis. Proximal circumflex: There was a 0 % stenosis at the site of a prior stent. Mid RCA: There was a 85 % stenosis. 1ST LESION INTERVENTIONS:  A balloon angioplasty with stent and balloon angioplasty procedure was performed on the 85 % lesion in the mid RCA. Following intervention there was a 0 % residual stenosis. A Resolute Integrity Rx 3.0 x 18 drug-eluting stent was placed across the lesion and deployed at a maximum inflation pressure of 16 marcelle. INDICATIONS:  --  Possible CAD: unstable angina.     PROCEDURES PERFORMED    --  Left heart catheterization without ventriculogram.  --  Left coronary angiography. --  Right coronary angiography. --  Outpatient. --  Mod Sedation Same Physician Initial 15min. --  Mod Sedation Same Physician Add 15min. --  Coronary Catheterization (w/ [facility]). --  Coronary Drug Eluting Stent w/PTCA. --  Intervention on mid RCA: balloon angioplasty, stent, balloon angioplasty. PROCEDURE: The risks and alternatives of the procedures and conscious sedation were explained to the patient and informed consent was obtained. The patient was brought to the cath lab and placed on the table. The planned puncture sites  were prepped and draped in the usual sterile fashion. --  Right radial artery access. After performing an Lionel's test to verify adequate ulnar artery supply to the hand, the radial site was prepped. The puncture site was infiltrated with local anesthetic. The vessel was accessed using the  modified Seldinger technique, a wire was advanced into the vessel, and a sheath was advanced over the wire into the vessel. --  Left heart catheterization without ventriculogram. A catheter was advanced over a guidewire into the ascending aorta. After recording ascending aortic pressure, the catheter was advanced across the aortic valve and left ventricular  pressure was recorded. The catheter was pulled back across the aortic valve and into the ascending aorta and pullback pressures were obtained. --  Left coronary artery angiography. A catheter was advanced over a guidewire into the aorta and positioned in the left coronary artery ostium under fluoroscopic guidance. Angiography was performed. --  Right coronary artery angiography. A catheter was advanced over a guidewire into the aorta and positioned in the right coronary artery ostium under fluoroscopic guidance. Angiography was performed. --  Outpatient. --  Mod Sedation Same Physician Initial 15min. --  Mod Sedation Same Physician Add 15min.     -- Coronary Catheterization (w/ [facility]). LESION INTERVENTION: A balloon angioplasty with stent and balloon angioplasty procedure was performed on the 85 % lesion in the mid RCA. Following intervention there was a 0 % residual stenosis. This was not a bifurcation lesion. This was  an ACC/AHA type B "moderate risk" lesion for intervention. The residual lesion was tubular and demonstrated no evidence of thrombus. There was ERIKA 3 flow before the procedure and ERIKA 3 flow after the procedure. There was no dissection. --  Vessel setup was performed. A Launcher 6Fr Jr 4.0 guiding catheter was used to cannulate the vessel. --  Vessel setup was performed. A Penboost 180cm wire was used to cross the lesion. --  Balloon angioplasty was performed, using a Trek Rx 3.0 x 15mm balloon, with 2 inflations and a maximum inflation pressure of 10 marcelle. --  A Resolute Integrity Rx 3.0 x 18 drug-eluting stent was placed across the lesion and deployed at a maximum inflation pressure of 16 marcelle. INTERVENTIONS:  --  Coronary Drug Eluting Stent w/PTCA. PROCEDURE COMPLETION: The patient tolerated the procedure well and was discharged from the cath lab. TIMING: Test started at 09:12. Test concluded at 09:47. HEMOSTASIS: The sheath was removed. The site was compressed with a Hemoband  device. Hemostasis was obtained. MEDICATIONS GIVEN: Baby Aspirin, 324 mg, PO, at 08:44. Plavix 75mg, 75 mg, PO, at 08:44. Fentanyl (1OOmcg/2 ml), 50 mcg, IV, at 09:17. Versed (2mg/2ml), 2 mg, IV, at 09:17. 1% Lidocaine, 2 ml,  subcutaneously, at 09:28. Verapamil (5mg/2ml), 2.5 mg, IV, at 09:29. Heparin 1000 units/ml, 4,000 units, IV, at 09:30. Nitroglycerin (200mcg/ml), 200 mcg, at 09:30. Fentanyl (1OOmcg/2 ml), 50 mcg, IV, at 09:31. Versed (2mg/2ml), 1 mg, IV,  at 09:31. Heparin 1000 units/ml, 6,000 units, IV, at 09:33. CONTRAST GIVEN: 70 ml Omnipaque (350 mg I /ml). RADIATION EXPOSURE: Fluoroscopy time: 3.83 min.     CORONARY VESSELS:   -- The coronary circulation is right dominant. --  Left main: Angiography showed minor luminal irregularities. --  Proximal LAD: Angiography showed minor luminal irregularities. --  Mid LAD: Angiography showed minor luminal irregularities. --  Distal LAD: The vessel was small sized. Angiography showed diffuse disease. There was a 95 % stenosis. --  Proximal circumflex: There was a 0 % stenosis at the site of a prior stent. --  Mid RCA: There was a 85 % stenosis. IMPRESSIONS:  pci rca with shira    RECOMMENDATIONS  weight loss, statin, dapt x 1 year. Prepared and signed by    Ludmila Crook DO  Signed 2017 10:48:50    Study diagram    Angiographic findings  Native coronary lesions:  ·Distal LAD: Lesion 1: 95 % stenosis. ·Proximal circumflex: Lesion 1: 0 % stenosis, site of prior stent. ·Mid RCA: Lesion 1: 85 % stenosis. Intervention results  Native coronary lesions:  ·balloon angioplasty, stent, and balloon angioplasty of the 85 % stenosis in mid RCA. 0 % residual stenosis. Stent: Resolute Integrity Rx 3.0 x 18 drug-eluting. Hemodynamic tables    Pressures:  Baseline  Pressures:  - HR: 66  Pressures:  - Rhythm:  Pressures:  -- Aortic Pressure (S/D/M): 107/62/77  Pressures:  -- Left Ventricle (s/edp): 109/32/--    Outputs:  Baseline  Outputs:  -- CALCULATIONS: Age in years: 56.94  Outputs:  -- CALCULATIONS: Body Surface Area: 2.83  Outputs:  -- CALCULATIONS: Height in cm: 188.00  Outputs:  -- CALCULATIONS: Sex: Male  Outputs:  -- CALCULATIONS: Weight in k.70      ECHO:  Results for orders placed during the hospital encounter of 19    Echo complete with contrast if indicated    Narrative  [address] [place] [address].   [address]  [phone]    Transthoracic Echocardiogram  2D, M-mode, Doppler, and Color Doppler    Study date:  11-Aug-2019    Patient: Jen Poster  MR number: [id number]  Account number: [de-identified]  : 1960  Age: 66 years  Gender: Male  Status: Inpatient  Location: Bedside  Height: 72 in  Weight: 239 lb  BP: 125/ 60 mmHg    Indications: Syncope    Diagnoses: R55. - Syncope and collapse    Sonographer:  Morris Daigle [facility] [place]  Primary Physician:  Ugo Payne MD  Referring Physician:  Lisa Garcia PA-C  Group:  [facility]  Interpreting Physician:  Jessica Hdz MD    SUMMARY    LEFT VENTRICLE:  Normal left ventricular systolic function, EF 03%. Normal left ventricular cavity size. Moderate concentric left ventricular hypertrophy. Normal left ventricular wall motion without regional wall motion abnormalities. Normal left  ventricular diastolic function. Normal left atrial pressures. LEFT ATRIUM:  Mild left atrial enlargement. RIGHT ATRIUM:  Mild right atrial enlargement. Left atrium larger than right atrium. AORTIC VALVE:  The aortic valve is tricuspid. The non coronary cusp is heavily calcified and the left coronary cusp is mild-to-moderately calcified. All 3 leaflets have good excursion. There is no aortic stenosis or regurgitation. TRICUSPID VALVE:  There was trace regurgitation. PULMONARY ARTERIES:  In adequate tricuspid regurgitation to evaluate pulmonary artery systolic pressures. HISTORY: PRIOR HISTORY: DM II, CAD with stent placement, ARLEEN, CHF, CKD, Alzheimer's disease    PROCEDURE: The procedure was performed at the bedside. This was a routine study. The transthoracic approach was used. The study included complete 2D imaging, M-mode, complete spectral Doppler, and color Doppler. Image quality was adequate. LEFT VENTRICLE: Normal left ventricular systolic function, EF 59%. Normal left ventricular cavity size. Moderate concentric left ventricular hypertrophy. Normal left ventricular wall motion without regional wall motion abnormalities. Normal left ventricular diastolic function. Normal left atrial pressures.     RIGHT VENTRICLE: The size was normal. Systolic function was normal. Wall thickness was normal.    LEFT ATRIUM: Mild left atrial enlargement. RIGHT ATRIUM: Mild right atrial enlargement. Left atrium larger than right atrium. MITRAL VALVE: Valve structure was normal. There was normal leaflet separation. DOPPLER: The transmitral velocity was within the normal range. There was no evidence for stenosis. There was no regurgitation. AORTIC VALVE: The aortic valve is tricuspid. The non coronary cusp is heavily calcified and the left coronary cusp is mild-to-moderately calcified. All 3 leaflets have good excursion. There is no aortic stenosis or regurgitation. TRICUSPID VALVE: The valve structure was normal. There was normal leaflet separation. DOPPLER: The transtricuspid velocity was within the normal range. There was no evidence for stenosis. There was trace regurgitation. PULMONIC VALVE: Leaflets exhibited normal thickness, no calcification, and normal cuspal separation. DOPPLER: The transpulmonic velocity was within the normal range. There was no regurgitation. PERICARDIUM: There was no pericardial effusion. The pericardium was normal in appearance. AORTA: The root exhibited normal size. PULMONARY ARTERY: In adequate tricuspid regurgitation to evaluate pulmonary artery systolic pressures. SYSTEM MEASUREMENT TABLES    2D  %FS: 40.9 %  Ao Diam: 3 cm  EF(Teich): 71.8 %  ESV(Teich): 28.1 ml  IVSd: 1.4 cm  LA Diam: 4.2 cm  LVEF MOD A4C: 67.1 %  LVIDd: 4.6 cm  LVIDs: 2.7 cm  LVPWd: 1.5 cm  SV(Teich): 71.5 ml    [employer] Accredited Echocardiography Laboratory    Prepared and electronically signed by    Stacey Lenz MD  Signed 12-Aug-2019 15:43:17    No results found for this or any previous visit. HOLTER  Results for orders placed during the hospital encounter of 10/20/17    Holter monitor - 24 hour    Narrative  PT NAME: Ta Terrazas  : 1960  AGE: 49 y.o.   GENDER: male  MRN: [id number] PROCEDURE: Holter monitor - 24 hour        INDICATIONS: Palpitations      FINDINGS:    Holter monitoring revealed predominant sinus rhythm with an average heart rate of 66 BPM, a minimum heart rate of 57 BPM, and a maximum heart rate of 82 BPM.    There were about 813 ventricular ectopic beats, mostly occurring as single PVC's with occasional trigeminy and couplets, with no evidence of ventricular tachycardia. There were about 12 supraventricular ectopic beats, mostly occurring as single PAC's and an atrial pair with no evidence of supraventricular tachycardia, atrial fibrillation, or atrial flutter. There was no evidence of significant bradyarrhythmia or advanced heart block. The longest R-R interval was 1.5 seconds. The patient's symptom diary reported no symptoms. VTE Prophylaxis: Enoxaparin (Lovenox)      Case discussed and reviewed with Dr. Truong Santoyo and is pending their agreement of the assessment and plan. Thank you for involving us in the care of your patient. Albaro Townsend, DO  PGY-1 Family Medicine  [facility] - [id number]    ==========================================================================================    Counseling / Coordination of Care  Total floor / unit time spent today 30 minutes minutes. Greater than 50% of total time was spent with the patient and / or family counseling and / or coordination of care. Apokalyyis/ nodila/Care Everywhere records reviewed: yes    ** Please Note: Fluency DirectDictation voice to text software may have been used in the creation of this document.  **

## 2023-08-24 NOTE — ASSESSMENT & PLAN NOTE
Lab Results   Component Value Date    HGBA1C 5.3 07/14/2022       No results for input(s): "POCGLU" in the last 72 hours.     Blood Sugar Average: Last 72 hrs:     Not on  meds at home  Monitor

## 2023-08-24 NOTE — PROGRESS NOTES
Report called to [facility], RN at [facility]. Patient accompanied by EMS with his belongings.    Modesta Daniel RN

## 2023-08-24 NOTE — PLAN OF CARE
Problem: PAIN - ADULT  Goal: Verbalizes/displays adequate comfort level or baseline comfort level  Description: Interventions:  - Encourage patient to monitor pain and request assistance  - Assess pain using appropriate pain scale  - Administer analgesics based on type and severity of pain and evaluate response  - Implement non-pharmacological measures as appropriate and evaluate response  - Consider cultural and social influences on pain and pain management  - Notify physician/advanced practitioner if interventions unsuccessful or patient reports new pain  Outcome: Progressing     Problem: INFECTION - ADULT  Goal: Absence or prevention of progression during hospitalization  Description: INTERVENTIONS:  - Assess and monitor for signs and symptoms of infection  - Monitor lab/diagnostic results  - Monitor all insertion sites, i.e. indwelling lines, tubes, and drains  - Monitor endotracheal if appropriate and nasal secretions for changes in amount and color  - [place] appropriate cooling/warming therapies per order  - Administer medications as ordered  - Instruct and encourage patient and family to use good hand hygiene technique  - Identify and instruct in appropriate isolation precautions for identified infection/condition  Outcome: Progressing     Problem: SAFETY ADULT  Goal: Patient will remain free of falls  Description: INTERVENTIONS:  - Educate patient/family on patient safety including physical limitations  - Instruct patient to call for assistance with activity   - Consult OT/PT to assist with strengthening/mobility   - Keep Call bell within reach  - Keep bed low and locked with side rails adjusted as appropriate  - Keep care items and personal belongings within reach  - Initiate and maintain comfort rounds  - Make Fall Risk Sign visible to staff  - Offer Toileting every 2 Hours, in advance of need  - Initiate/Maintain bed alarm  - Obtain necessary fall risk management equipment.   - Apply yellow socks and bracelet for high fall risk patients  - Consider moving patient to room near nurses station  Outcome: Progressing  Goal: Maintain or return to baseline ADL function  Description: INTERVENTIONS:  -  Assess patient's ability to carry out ADLs; assess patient's baseline for ADL function and identify physical deficits which impact ability to perform ADLs (bathing, care of mouth/teeth, toileting, grooming, dressing, etc.)  - Assess/evaluate cause of self-care deficits   - Assess range of motion  - Assess patient's mobility; develop plan if impaired  - Assess patient's need for assistive devices and provide as appropriate  - Encourage maximum independence but intervene and supervise when necessary  - Involve family in performance of ADLs  - Assess for home care needs following discharge   - Consider OT consult to assist with ADL evaluation and planning for discharge  - Provide patient education as appropriate  Outcome: Progressing  Goal: Maintains/Returns to pre admission functional level  Description: INTERVENTIONS:  - Perform BMAT or MOVE assessment daily.   - Set and communicate daily mobility goal to care team and patient/family/caregiver. - Collaborate with rehabilitation services on mobility goals if consulted  - Perform Range of Motion 4 times a day. - Reposition patient every 2 hours.   - Dangle patient 3 times a day  - Stand patient 3 times a day  - Ambulate patient 3 times a day  - Out of bed to chair 3 times a day   - Out of bed for meals 3 times a day  - Out of bed for toileting  - Record patient progress and toleration of activity level   Outcome: Progressing     Problem: DISCHARGE PLANNING  Goal: Discharge to home or other facility with appropriate resources  Description: INTERVENTIONS:  - Identify barriers to discharge w/patient and caregiver  - Arrange for needed discharge resources and transportation as appropriate  - Identify discharge learning needs (meds, wound care, etc.)  - Arrange for interpretive services to assist at discharge as needed  - Refer to Case Management Department for coordinating discharge planning if the patient needs post-hospital services based on physician/advanced practitioner order or complex needs related to functional status, cognitive ability, or social support system  Outcome: Progressing     Problem: Knowledge Deficit  Goal: Patient/family/caregiver demonstrates understanding of disease process, treatment plan, medications, and discharge instructions  Description: Complete learning assessment and assess knowledge base.   Interventions:  - Provide teaching at level of understanding  - Provide teaching via preferred learning methods  Outcome: Progressing     Problem: CARDIOVASCULAR - ADULT  Goal: Maintains optimal cardiac output and hemodynamic stability  Description: INTERVENTIONS:  - Monitor I/O, vital signs and rhythm  - Monitor for S/S and trends of decreased cardiac output  - Administer and titrate ordered vasoactive medications to optimize hemodynamic stability  - Assess quality of pulses, skin color and temperature  - Assess for signs of decreased coronary artery perfusion  - Instruct patient to report change in severity of symptoms  Outcome: Progressing  Goal: Absence of cardiac dysrhythmias or at baseline rhythm  Description: INTERVENTIONS:  - Continuous cardiac monitoring, vital signs, obtain 12 lead EKG if ordered  - Administer antiarrhythmic and heart rate control medications as ordered  - Monitor electrolytes and administer replacement therapy as ordered  Outcome: Progressing

## 2023-08-24 NOTE — ASSESSMENT & PLAN NOTE
Lab Results   Component Value Date    HGBA1C 5.3 07/14/2022       Recent Labs     08/23/23  2316 08/24/23  0546 08/24/23  1049   POCGLU 113 117 106       Blood Sugar Average: Last 72 hrs:  (P) 112   Not on  meds at home  Monitor

## 2023-08-24 NOTE — H&P
[address] [facility]  H&P  Name: Wilman Rivera 63 y.o. male I MRN: [id number]  Unit/Bed#: [address] [address] I Date of Admission: 8/23/2023   Date of Service: 8/23/2023 I Hospital Day: 0      Assessment/Plan   * Chest pain  Assessment & Plan  Patient presented with recurrent chest pain. Recent discharge from [address] [place] [address] on 8/21/23  with Recent cardiac catheterization that did not reveal any critical lesions and medical management was recommended. Patient was evaluated by cardiology at [facility]  Per cardiology EKG with some anterior ST depressions on the initial ECG which resolved on subsequent EKGs. His troponins are mildly elevated and flat without significant change. Cardiology recommended transfer to [address] [place] [address] for cardiac MRI/ further management to assess for myopericarditis   Continue aspirin, statin, Plavix, beta-blocker, Ranexa and Imdur  Consult cardiology for further management    Major depressive disorder, recurrent, moderate ([address])  Assessment & Plan  Continue Abilify    GERD (gastroesophageal reflux disease)  Assessment & Plan  Continue PPI    CVA (cerebral vascular accident) [facility])  Assessment & Plan  History of CVA in 2005  Continue aspirin and statin    History of gastric sleeve procedure  Assessment & Plan  History of gastric sleeve procedure noted    Type 2 diabetes mellitus with diabetic neuropathy, with long-term current use of insulin ([facility])  Assessment & Plan  Lab Results   Component Value Date    HGBA1C 5.3 07/14/2022       No results for input(s): "POCGLU" in the last 72 hours.     Blood Sugar Average: Last 72 hrs:     Not on  meds at home  Monitor    Opioid dependence, continuous ([facility])  Assessment & Plan  • Chronic pain syndrome  • Morphine administration thru intrathecal pain pump    Morbid obesity (HCC)  Assessment & Plan  With history of prior gastric sleeve       VTE Pharmacologic Prophylaxis: VTE Score: 6 High Risk (Score >/= 5) - Pharmacological DVT Prophylaxis Ordered: enoxaparin (Lovenox). Sequential Compression Devices Ordered. Code Status: Full code  Discussion with family: Family aware. Anticipated Length of Stay: Patient will be admitted on an inpatient basis with an anticipated length of stay of greater than 2 midnights secondary to Management of chest pain. Total Time Spent on Date of Encounter in care of patient: 75 minutes This time was spent on one or more of the following: performing physical exam; counseling and coordination of care; obtaining or reviewing history; documenting in the medical record; reviewing/ordering tests, medications or procedures; communicating with other healthcare professionals and discussing with patient's family/caregivers. Chief Complaint:   Chest pain    History of Present Illness:  Jordyn Gilbert is a 63 y.o. male with a PMH of CAD, chronic pain on morphine pump, type 2 diabetes  who presented to [address] [place] [address] with chest pain  Underlying history of CAD with previous stent placement  Patient was admitted here from 8/18 through 8/21 due to chest pain  Patient underwent cardiac cath on 8/18, did not reveal any critical lesions and medical management was recommended. Medications were optimized and patient was discharged home however  He presented again today with substernal chest pressure, associated with shortness of breath palpitation nausea and diaphoresis    Evaluated by cardiology at [address] [place] [address], concern for myopericarditis with recommendation to transfer to [facility] for cardiac MRI and further management      Review of Systems:  Review of Systems   Constitutional: Positive for diaphoresis. Negative for chills and fever. Respiratory: Positive for chest tightness and shortness of breath. Cardiovascular: Positive for chest pain and palpitations. Negative for leg swelling. Gastrointestinal: Positive for nausea.  Negative for abdominal pain, diarrhea and vomiting. Endocrine: Negative for polyuria. Genitourinary: Negative for difficulty urinating and dysuria. Neurological: Negative for dizziness, speech difficulty and headaches. All other systems reviewed and are negative.       Past Medical and Surgical History:   Past Medical History:   Diagnosis Date   • Acute on chronic diastolic congestive heart failure ([address])    • Altered gait    • Alzheimer disease ([address])     per patients,,early onset    • Angina pectoris ([address])    • Anxiety    • Arthritis    • Brain aneurysm     coils placed   • Cardiac disease    • Chest pain 1/13/2016   • Chronic kidney disease    • Chronic pain     back/ right groin and rle- has morphine pump   • Constipation    • COPD (chronic obstructive pulmonary disease) ([facility])    • Coronary artery disease    • CPAP (continuous positive airway pressure) dependence    • Decubital ulcer     sacral decub-occured 5/2019-sees wound care/debide in OR today 6/6/2019   • Dependent on walker for ambulation     w/c for long distance   • Depression    • Diabetes mellitus ([facility])     insulin dependent   • Dizziness     occ   • Dysphagia    • Enlarged prostate    • Fall    • GERD (gastroesophageal reflux disease)    • Heart failure ([address])    • Hiatal hernia    • Hx of gastric bypass 11/19/2018   • Hypercholesterolemia    • Hypertension    • MI (myocardial infarction) ([address])     2017- stents x2   • Migraine    • Morbid obesity ([facility])     gastric bypass sleeve 11/2018-wt loss 125 lb   • Neuropathy    • Oxygen dependent     Q HS  2LPM with CPAP and prn during day 2-3 LPM    • Pressure injury of skin    • Renal disorder    • Shortness of breath    • Skin abnormality     sacral wound - covered with pad   • Sleep apnea    • Stented coronary artery    • Stroke [facility])     vision loss b/l  2005, residual R leg weakness   • Urinary frequency    • Use of cane as ambulatory aid    • Wears dentures    • Wears glasses    • Wears glasses    • Wheelchair dependent        Past Surgical History:   Procedure Laterality Date   • BACK SURGERY     • BRAIN SURGERY     • CARDIAC CATHETERIZATION      with stents   • CARDIAC CATHETERIZATION N/A 8/18/2023    Procedure: Cardiac Coronary Angiogram;  Surgeon: Oanh Erwin MD;  Location: [facility] CARD[facility]ATH [facility]; Service: Cardiology   • CEREBRAL ANEURYSM REPAIR      with coils   • COLONOSCOPY     • ESOPHAGOGASTRODUODENOSCOPY N/A 7/1/2016    Procedure: ESOPHAGOGASTRODUODENOSCOPY (EGD); Surgeon: Cait Urban MD;  Location: [facility] GI LAB; Service:    • GASTRIC BYPASS  11/19/2018   • HERNIA REPAIR     • HIATAL HERNIA REPAIR     • INFUSION PUMP IMPLANTATION Left     morphine   • INTRATHECAL PUMP IMPLANTATION Left 7/9/2020    Procedure: REVISION INTRATHECAL PAIN PUMP POCKET, LEFT ABDOMEN;  Surgeon: Lea Berrios MD;  Location: [facility] MAIN OR;  Service: Neurosurgery   • KNEE ARTHROSCOPY Right    • KNEE ARTHROSCOPY Right    • PERONEAL NERVE DECOMPRESSION Right    • PA DEBRIDEMENT OPEN WOUND 20 SQ CM/< N/A 6/6/2019    Procedure: EXCISIONAL DEBRIDEMENT OF SACRAL DECUBITUS ULCER;  Surgeon: Ezequiel Lindsay MD;  Location: [facility] Main OR;  Service: General   • PA ESOPHAGOGASTRODUODENOSCOPY TRANSORAL DIAGNOSTIC N/A 2/27/2017    Procedure: ESOPHAGOGASTRODUODENOSCOPY (EGD); Surgeon: Cait Urban MD;  Location: [facility] GI [facility]; Service: Gastroenterology   • PA ESOPHAGOGASTRODUODENOSCOPY TRANSORAL DIAGNOSTIC N/A 8/23/2018    Procedure: ESOPHAGOGASTRODUODENOSCOPY (EGD) with biopsy;  Surgeon: Cait Urban MD;  Location: [facility];   Service: Gastroenterology   • PA IMPLTJ/RPLCMT ITHCL/EDRL DRUG NFS PRGRBL PUMP Left 10/13/2020    Procedure: EXPLORATION OF INTRATHECAL PAIN PUMP SYSTEM INTEGRITY FOR POSSIBLE REPLACEMENT OF CATHETER AND PUMP.;  Surgeon: Lea Berrios MD;  Location: [facility];  Service: Neurosurgery   • PA IMPLTJ/RPLCMT ITHCL/EDRL DRUG NFS SUBQ RSVR N/A 1/19/2017    Procedure: REMOVAL / [address];  Surgeon: Guanako Conklin MD;  Location[facility];  Service: Orthopedics   • OK IMPLTJ/RPLCMT ITHCL/EDRL DRUG NFS SUBQ RSVR N/A 5/16/2016    Procedure: REPLACEMENT AND PROGRAM PUMP ;  Surgeon: Kirit Wheatley MD;  Location[facility];  Service: Orthopedics   • OK PRQ IMPLTJ NSTIM ELECTRODE ARRAY EPIDURAL Right 3/17/2021    Procedure: INSERTION THORACIC DORSAL COLUMN SPINAL CORD STIMULATOR PERCUTANEOUS W IMPLANTABLE PULSE GENERATOR, RIGHT;  Surgeon: Chiquita Baeza MD;  Location: [facility];  Service: Neurosurgery       Meds/Allergies:  Prior to Admission medications    Medication Sig Start Date End Date Taking?  Authorizing Provider   albuterol (ACCUNEB) 1.25 MG/3ML nebulizer solution Take 1 ampule by nebulization every 6 (six) hours as needed for wheezing    Historical Provider, MD   amLODIPine (NORVASC) 5 mg tablet Take 1 tablet (5 mg total) by mouth daily Do not start before August 22, 2023. 8/22/23 9/21/23  Shin Schaeffer MD   ARIPiprazole (ABILIFY) 5 mg tablet Take 10 mg by mouth daily   Patient not taking: Reported on 8/23/2023 8/6/20   Historical Provider, MD   aspirin 81 mg chewable tablet Chew 1 tablet (81 mg total) daily Do not start before August 22, 2023. 8/22/23 9/21/23  Shin Schaeffer MD   atorvastatin (LIPITOR) 80 mg tablet Take 1 tablet (80 mg total) by mouth daily after dinner 8/21/23 9/20/23  Shin Schaeffer MD   baclofen 10 mg tablet Take 10 mg by mouth 3 (three) times a day    Historical Provider, MD   CALCIUM PO Take 1 tablet by mouth daily    Historical Provider, MD   carvedilol (COREG) 12.5 mg tablet Take 1 tablet (12.5 mg total) by mouth 2 (two) times a day with meals 8/21/23 9/20/23  Shin Schaeffer MD   clopidogrel (PLAVIX) 75 mg tablet Take 75 mg by mouth daily    Historical Provider, MD   Cyanocobalamin (VITAMIN B-12 PO) Take 1 tablet by mouth daily    Historical Provider, MD   ergocalciferol (VITAMIN D2) 50,000 units Take 50,000 Units by mouth once a week 1/25/19   Historical Provider, MD   folic acid (FOLVITE) 1 mg tablet Take 1 mg by mouth daily  1/25/19   Historical Provider, MD   gabapentin (NEURONTIN) 300 mg capsule TAKE 1 CAPSULE (300 MG TOTAL) BY MOUTH AS NEEDED (MIGRAINE) 11/8/19   Lester Norris PA-C   gabapentin (NEURONTIN) 600 MG tablet Take 600 mg by mouth 3 (three) times a day 1/9/20   Historical Provider, MD   hydrocortisone 1 % lotion APPLY TOPICALLY 2 (TWO) TIMES A DAY INDICATIONS  USING ON FEET. 7/1/20   Historical Provider, MD   hydrOXYzine HCL (ATARAX) 25 mg tablet Take 75 mg by mouth 2 (two) times a day as needed for anxiety   Patient not taking: Reported on 8/23/2023    Historical Provider, MD   isosorbide mononitrate (IMDUR) 60 mg 24 hr tablet Take 1 tablet (60 mg total) by mouth daily Do not start before August 22, 2023. 8/22/23 9/21/23  Arnie Denis MD   memantine (NAMENDA) 10 mg tablet TAKE 1 TABLET (10 MG TOTAL) BY MOUTH 2 (TWO) TIMES A DAY. 8/7/20   Historical Provider, MD   methocarbamol (ROBAXIN) 750 mg tablet Take 1 tablet (750 mg total) by mouth every 6 (six) hours as needed for muscle spasms 3/16/21   Alicja Metzger MD   morphine (MSIR) 30 MG tablet Take 30 mg by mouth every 8 (eight) hours Prescribed on 8/22. Take for 5 days rx by Dr. Mira Beyer (pain management)    Historical Provider, MD   Morphine Sulfate Microinfusion (MORPHINE SULF MICROINFUSION PF IJ) Inject 14 mg as directed daily Via infusion pump    Historical Provider, MD   nitroglycerin (NITROSTAT) 0.4 mg SL tablet Place 0.4 mg under the tongue every 5 (five) minutes as needed for chest pain (pt has not  used recently).       Historical Provider, MD   nystatin (MYCOSTATIN) cream Apply 1 application topically 2 (two) times a day 1/11/20   Historical Provider, MD   omeprazole (PriLOSEC) 20 mg delayed release capsule omeprazole 20 mg capsule,delayed release    Historical Provider, MD   omeprazole (PriLOSEC) 40 MG capsule Take 40 mg by mouth daily    Historical Provider, MD   ondansetron (ZOFRAN) 4 mg tablet Take 1 tablet (4 mg total) by mouth every 8 (eight) hours as needed for nausea or vomiting 8/11/21   Gianni Pelayo PA-C   Pediatric Multivit-Minerals-C (Polyvitamin/Iron) CHEW Chew 1 tablet daily 8/9/20   Historical Provider, MD   POTASSIUM PO Take 40 mEq by mouth 2 (two) times a day    Historical Provider, MD   prochlorperazine (COMPAZINE) 10 mg tablet Take 1 tablet (10 mg total) by mouth every 6 (six) hours as needed for nausea or vomiting (migraine)  Patient not taking: Reported on 8/23/2023 10/18/19   Natali Davenport PA-C   ranolazine (RANEXA) 1000 MG SR tablet Take 1 tablet (1,000 mg total) by mouth every 12 (twelve) hours 8/21/23 9/20/23  Nalini Kramer MD   sertraline (ZOLOFT) 100 mg tablet Take 1.5 tablets (150 mg total) by mouth daily  Patient not taking: Reported on 8/11/2021 1/31/20   Holly Scott MD   sucralfate (CARAFATE) 1 g/10 mL suspension Take 10 mL (1 g total) by mouth 4 (four) times a day 9/8/21   Parmjit Varghese MD   tamsulosin (FLOMAX) 0.4 mg Take 0.4 mg by mouth daily with dinner. Historical Provider, MD   traZODone (DESYREL) 100 mg tablet Take 2 tablets (200 mg total) by mouth daily at bedtime as needed for sleep  Patient not taking: Reported on 8/11/2021 1/31/20   Holly Scott MD   [employer] SHORT PEN NEEDLES 31G X 8 MM MISC 4 INJECTIONS PER DAY DX  E11.8 8/28/18   Historical Provider, MD [place] have reviewed home medications using recent Epic encounter.     Allergies: No Known Allergies    Social History:  Marital Status: /Civil Union     Substance Use History:   Social History     Substance and Sexual Activity   Alcohol Use Not Currently     Social History     Tobacco Use   Smoking Status Never   Smokeless Tobacco Never     Social History     Substance and Sexual Activity   Drug Use Not Currently   • Types: Marijuana       Family History:    Family History   Problem Relation Age of Onset   • Diabetes unspecified Mother    • Diabetes unspecified Brother    • Diabetes unspecified Paternal Uncle    • Diabetes unspecified Maternal Grandmother    • Diabetes unspecified Paternal Grandmother    • Diabetes unspecified Brother    • Heart attack Father      Physical Exam:     Vitals:   Blood Pressure: 116/71 (08/23/23 2213)  Pulse: 80 (08/23/23 2213)  Temperature: 98.8 °F (37.1 °C) (08/23/23 2213)  Respirations: 20 (08/23/23 2213)  Height: 6' (182.9 cm) (08/23/23 2203)  Weight - Scale: (!) 137 kg (301 lb) (08/23/23 2203)  SpO2: 93 % (08/23/23 2213)    Physical Exam  Vitals reviewed. Constitutional:       Appearance: Normal appearance. He is obese. He is not ill-appearing. HENT:      Head: Normocephalic and atraumatic. Mouth/Throat:      Mouth: Mucous membranes are moist.      Pharynx: No oropharyngeal exudate. Eyes:      General: No scleral icterus. Extraocular Movements: Extraocular movements intact. Cardiovascular:      Rate and Rhythm: Normal rate and regular rhythm. Pulses: Normal pulses. Heart sounds: Normal heart sounds. No murmur heard. Pulmonary:      Effort: Pulmonary effort is normal. No respiratory distress. Breath sounds: Normal breath sounds. No wheezing. Abdominal:      General: Bowel sounds are normal. There is no distension. Palpations: Abdomen is soft. Tenderness: There is no abdominal tenderness. Comments: Left lower quadrant morphine pump in place   Musculoskeletal:         General: Normal range of motion. Cervical back: Normal range of motion and neck supple. Right lower leg: No edema. Left lower leg: No edema. Skin:     General: Skin is warm and dry. Neurological:      General: No focal deficit present. Mental Status: He is alert and oriented to person, place, and time. Cranial Nerves: No cranial nerve deficit.    Psychiatric:         Mood and Affect: Mood normal.            Additional Data:     Lab Results:  Results from last 7 days   Lab Units 08/23/23  1152   WBC Thousand/uL 10.83*   HEMOGLOBIN g/dL 15.0   HEMATOCRIT % 46.9   PLATELETS Thousands/uL 235   NEUTROS PCT % 68   LYMPHS PCT % 19   MONOS PCT % 7   EOS PCT % 4     Results from last 7 days   Lab Units 08/23/23  1152   SODIUM mmol/L 138   POTASSIUM mmol/L 4.4   CHLORIDE mmol/L 110*   CO2 mmol/L 20*   BUN mg/dL 10   CREATININE mg/dL 1.18   ANION GAP mmol/L 8   CALCIUM mg/dL 8.6   ALBUMIN g/dL 3.8   TOTAL BILIRUBIN mg/dL 0.60   ALK PHOS U/L 63   ALT U/L 27   AST U/L 38   GLUCOSE RANDOM mg/dL 109                       Lines/Drains:  Invasive Devices     Peripheral Intravenous Line  Duration           Peripheral IV 08/23/23 Left Antecubital <1 day                    Imaging: X-ray reviewed no acute abnormality  No orders to display       EKG and Other Studies Reviewed on Admission:   · EKG: Personally reviewed with normal sinus rhythm at 69 bpm, right bundle branch    ** Please Note: This note has been constructed using a voice recognition system.  **

## 2023-08-24 NOTE — ASSESSMENT & PLAN NOTE
Patient presented with recurrent chest pain. Recent discharge from [address] [place] [address] on 8/21/23  with Recent cardiac catheterization that did not reveal any critical lesions and medical management was recommended. Patient was evaluated by cardiology at [facility]  Per cardiology EKG with some anterior ST depressions on the initial ECG which resolved on subsequent EKGs. His troponins are mildly elevated and flat without significant change.     Cardiology recommended transfer to [address] [place] [address] for cardiac MRI/ further management to assess for myopericarditis   Continue aspirin, statin, Plavix, beta-blocker, Ranexa and Imdur  Consult cardiology for further management

## 2023-08-24 NOTE — ASSESSMENT & PLAN NOTE
• Chronic pain syndrome  • Morphine administration thru intrathecal pain pump  • We will continue scheduled oral morphine, as well as as needed oral and IV Dilaudid  •

## 2023-08-25 LAB
ANION GAP SERPL CALCULATED.3IONS-SCNC: 10 MMOL/L
BASOPHILS # BLD AUTO: 0.07 THOUSANDS/ÂΜL (ref 0–0.1)
BASOPHILS NFR BLD AUTO: 0 % (ref 0–1)
BUN SERPL-MCNC: 23 MG/DL (ref 5–25)
CALCIUM SERPL-MCNC: 9.3 MG/DL (ref 8.4–10.2)
CHLORIDE SERPL-SCNC: 107 MMOL/L (ref 96–108)
CO2 SERPL-SCNC: 21 MMOL/L (ref 21–32)
CREAT SERPL-MCNC: 1.36 MG/DL (ref 0.6–1.3)
EOSINOPHIL # BLD AUTO: 0.18 THOUSAND/ÂΜL (ref 0–0.61)
EOSINOPHIL NFR BLD AUTO: 1 % (ref 0–6)
ERYTHROCYTE [DISTWIDTH] IN BLOOD BY AUTOMATED COUNT: 13.2 % (ref 11.6–15.1)
GFR SERPL CREATININE-BSD FRML MDRD: 56 ML/MIN/1.73SQ M
GLUCOSE SERPL-MCNC: 121 MG/DL (ref 65–140)
GLUCOSE SERPL-MCNC: 131 MG/DL (ref 65–140)
HCT VFR BLD AUTO: 49.3 % (ref 36.5–49.3)
HGB BLD-MCNC: 16.2 G/DL (ref 12–17)
IMM GRANULOCYTES # BLD AUTO: 0.2 THOUSAND/UL (ref 0–0.2)
IMM GRANULOCYTES NFR BLD AUTO: 1 % (ref 0–2)
LYMPHOCYTES # BLD AUTO: 0.93 THOUSANDS/ÂΜL (ref 0.6–4.47)
LYMPHOCYTES NFR BLD AUTO: 5 % (ref 14–44)
MCH RBC QN AUTO: 30.5 PG (ref 26.8–34.3)
MCHC RBC AUTO-ENTMCNC: 32.9 G/DL (ref 31.4–37.4)
MCV RBC AUTO: 93 FL (ref 82–98)
MONOCYTES # BLD AUTO: 0.99 THOUSAND/ÂΜL (ref 0.17–1.22)
MONOCYTES NFR BLD AUTO: 5 % (ref 4–12)
NEUTROPHILS # BLD AUTO: 18.25 THOUSANDS/ÂΜL (ref 1.85–7.62)
NEUTS SEG NFR BLD AUTO: 88 % (ref 43–75)
NRBC BLD AUTO-RTO: 0 /100 WBCS
PLATELET # BLD AUTO: 270 THOUSANDS/UL (ref 149–390)
PMV BLD AUTO: 10.3 FL (ref 8.9–12.7)
POTASSIUM SERPL-SCNC: 4.3 MMOL/L (ref 3.5–5.3)
RBC # BLD AUTO: 5.31 MILLION/UL (ref 3.88–5.62)
SODIUM SERPL-SCNC: 138 MMOL/L (ref 135–147)
WBC # BLD AUTO: 20.62 THOUSAND/UL (ref 4.31–10.16)

## 2023-08-25 PROCEDURE — 85025 COMPLETE CBC W/AUTO DIFF WBC: CPT | Performed by: INTERNAL MEDICINE

## 2023-08-25 PROCEDURE — C9113 INJ PANTOPRAZOLE SODIUM, VIA: HCPCS | Performed by: INTERNAL MEDICINE

## 2023-08-25 PROCEDURE — 82948 REAGENT STRIP/BLOOD GLUCOSE: CPT

## 2023-08-25 PROCEDURE — 99232 SBSQ HOSP IP/OBS MODERATE 35: CPT | Performed by: INTERNAL MEDICINE

## 2023-08-25 PROCEDURE — 80048 BASIC METABOLIC PNL TOTAL CA: CPT | Performed by: INTERNAL MEDICINE

## 2023-08-25 RX ORDER — HYDROMORPHONE HYDROCHLORIDE 2 MG/1
8 TABLET ORAL EVERY 4 HOURS PRN
Status: DISCONTINUED | OUTPATIENT
Start: 2023-08-25 | End: 2023-08-31 | Stop reason: HOSPADM

## 2023-08-25 RX ORDER — LORAZEPAM 0.5 MG/1
0.5 TABLET ORAL ONCE AS NEEDED
Status: DISCONTINUED | OUTPATIENT
Start: 2023-08-25 | End: 2023-08-31

## 2023-08-25 RX ORDER — KETOROLAC TROMETHAMINE 30 MG/ML
15 INJECTION, SOLUTION INTRAMUSCULAR; INTRAVENOUS EVERY 8 HOURS SCHEDULED
Status: COMPLETED | OUTPATIENT
Start: 2023-08-25 | End: 2023-08-25

## 2023-08-25 RX ORDER — HYDROMORPHONE HYDROCHLORIDE 2 MG/1
6 TABLET ORAL EVERY 4 HOURS PRN
Status: DISCONTINUED | OUTPATIENT
Start: 2023-08-25 | End: 2023-08-31 | Stop reason: HOSPADM

## 2023-08-25 RX ORDER — SODIUM CHLORIDE, SODIUM GLUCONATE, SODIUM ACETATE, POTASSIUM CHLORIDE, MAGNESIUM CHLORIDE, SODIUM PHOSPHATE, DIBASIC, AND POTASSIUM PHOSPHATE .53; .5; .37; .037; .03; .012; .00082 G/100ML; G/100ML; G/100ML; G/100ML; G/100ML; G/100ML; G/100ML
125 INJECTION, SOLUTION INTRAVENOUS CONTINUOUS
Status: DISCONTINUED | OUTPATIENT
Start: 2023-08-25 | End: 2023-08-26

## 2023-08-25 RX ORDER — PANTOPRAZOLE SODIUM 40 MG/10ML
40 INJECTION, POWDER, LYOPHILIZED, FOR SOLUTION INTRAVENOUS
Status: DISCONTINUED | OUTPATIENT
Start: 2023-08-25 | End: 2023-08-28

## 2023-08-25 RX ADMIN — CARVEDILOL 12.5 MG: 12.5 TABLET, FILM COATED ORAL at 17:13

## 2023-08-25 RX ADMIN — KETOROLAC TROMETHAMINE 15 MG: 30 INJECTION, SOLUTION INTRAMUSCULAR; INTRAVENOUS at 14:56

## 2023-08-25 RX ADMIN — RANOLAZINE 1000 MG: 500 TABLET, FILM COATED, EXTENDED RELEASE ORAL at 21:44

## 2023-08-25 RX ADMIN — GABAPENTIN 600 MG: 300 CAPSULE ORAL at 21:43

## 2023-08-25 RX ADMIN — HYDROMORPHONE HYDROCHLORIDE 8 MG: 2 TABLET ORAL at 21:44

## 2023-08-25 RX ADMIN — HYDROMORPHONE HYDROCHLORIDE 1 MG: 1 INJECTION, SOLUTION INTRAMUSCULAR; INTRAVENOUS; SUBCUTANEOUS at 09:26

## 2023-08-25 RX ADMIN — ACETAMINOPHEN 975 MG: 325 TABLET, FILM COATED ORAL at 05:00

## 2023-08-25 RX ADMIN — BACLOFEN 10 MG: 10 TABLET ORAL at 21:44

## 2023-08-25 RX ADMIN — ACETAMINOPHEN 975 MG: 325 TABLET, FILM COATED ORAL at 21:43

## 2023-08-25 RX ADMIN — SODIUM CHLORIDE, SODIUM GLUCONATE, SODIUM ACETATE, POTASSIUM CHLORIDE, MAGNESIUM CHLORIDE, SODIUM PHOSPHATE, DIBASIC, AND POTASSIUM PHOSPHATE 125 ML/HR: .53; .5; .37; .037; .03; .012; .00082 INJECTION, SOLUTION INTRAVENOUS at 10:10

## 2023-08-25 RX ADMIN — ONDANSETRON 4 MG: 2 INJECTION INTRAMUSCULAR; INTRAVENOUS at 01:36

## 2023-08-25 RX ADMIN — HYDROMORPHONE HYDROCHLORIDE 8 MG: 2 TABLET ORAL at 15:48

## 2023-08-25 RX ADMIN — ONDANSETRON 4 MG: 2 INJECTION INTRAMUSCULAR; INTRAVENOUS at 07:57

## 2023-08-25 RX ADMIN — IBUPROFEN 600 MG: 600 TABLET, FILM COATED ORAL at 05:00

## 2023-08-25 RX ADMIN — BACLOFEN 10 MG: 10 TABLET ORAL at 15:01

## 2023-08-25 RX ADMIN — PANTOPRAZOLE SODIUM 40 MG: 40 INJECTION, POWDER, FOR SOLUTION INTRAVENOUS at 10:04

## 2023-08-25 RX ADMIN — PANTOPRAZOLE SODIUM 40 MG: 40 TABLET, DELAYED RELEASE ORAL at 05:00

## 2023-08-25 RX ADMIN — KETOROLAC TROMETHAMINE 15 MG: 30 INJECTION, SOLUTION INTRAMUSCULAR; INTRAVENOUS at 22:42

## 2023-08-25 RX ADMIN — TAMSULOSIN HYDROCHLORIDE 0.4 MG: 0.4 CAPSULE ORAL at 17:14

## 2023-08-25 RX ADMIN — ATORVASTATIN CALCIUM 80 MG: 80 TABLET, FILM COATED ORAL at 18:29

## 2023-08-25 RX ADMIN — MORPHINE SULFATE 30 MG: 15 TABLET ORAL at 04:56

## 2023-08-25 RX ADMIN — SODIUM CHLORIDE, SODIUM GLUCONATE, SODIUM ACETATE, POTASSIUM CHLORIDE, MAGNESIUM CHLORIDE, SODIUM PHOSPHATE, DIBASIC, AND POTASSIUM PHOSPHATE 125 ML/HR: .53; .5; .37; .037; .03; .012; .00082 INJECTION, SOLUTION INTRAVENOUS at 18:50

## 2023-08-25 RX ADMIN — GABAPENTIN 600 MG: 300 CAPSULE ORAL at 15:00

## 2023-08-25 NOTE — ASSESSMENT & PLAN NOTE
Patient presented with recurrent chest pain. Recent discharge from [facility] on 8/21/23  with Recent cardiac catheterization that did not reveal any critical lesions and medical management was recommended. Patient was evaluated by cardiology at [address] [place] [address]  Per cardiology EKG with some anterior ST depressions on the initial ECG which resolved on subsequent EKGs. His troponins are mildly elevated and flat without significant change.     Cardiology recommended transfer to [facility] for cardiac MRI/ further management to assess for myopericarditis   Continue aspirin, statin, Plavix, beta-blocker, Ranexa and Imdur  Consult cardiology for further management    8/25/23: Patient is having significant nausea and vomiting -possibly from colchicine which was initiated yesterday we will hold, will also hold his morphine and continue with oral Dilaudid and IV Dilaudid for breakthrough pain  Change from ibuprofen to IV Toradol 15 mg tid for 24 hours  Continue on IV Protonix, continue on fluids  Appreciate cardiology recommendations

## 2023-08-25 NOTE — ASSESSMENT & PLAN NOTE
Lab Results   Component Value Date    HGBA1C 5.3 07/14/2022       Recent Labs     08/23/23  2316 08/24/23  0546 08/24/23  1049 08/25/23  1107   POCGLU 113 117 106 121       Blood Sugar Average: Last 72 hrs:  (P) 114.25   Not on  meds at home  Monitor

## 2023-08-25 NOTE — ASSESSMENT & PLAN NOTE
• Chronic pain syndrome  • Morphine administration thru intrathecal pain pump  • We will continue on oral Dilaudid and IV Dilaudid  •

## 2023-08-25 NOTE — PROGRESS NOTES
Cardiology Progress Note - Nya Mittal 63 y.o. male MRN: [id number]    Unit/Bed#: [facility] 522-01 Encounter: [id number]      Assessment:  1. Chest pain consistent with myopericarditis   2. CAD s/p PCI/stents to LAD, LCX, RCA  3. Preserved LV systolic function   4. Benign essential hypertension  5. Dyslipidemia   6. DM type 2  7. Morbid obesity - BMI 41    Plan:  1. Noted improvement of chest pain yesterday, now with nausea and vomiting - not taking oral medications  2. Utilize IV Toradol until can take oral medication  - need to carefully monitor renal function  3. IV PPI added, history of prior gastric bypass  4. Cardiac MRI pending  5. Hold colchicine currently due to GI symptoms, resume when able to tolerate oral medication  6. Echo yesterday with no effusion  7. Would discontinue Plavix while on ibuprofen  8. Continue beta-blocker and statin therapy when able to tolerate oral medication  9. Discussed with SLIM     Subjective:   Patient seen and examined. Events overnight noted, now with nausea and vomiting. Reports improvement in chest pain initially. Objective:     Vitals: Blood pressure 138/71, pulse 56, temperature 98.4 °F (36.9 °C), temperature source Oral, resp. rate 18, height 6' (1.829 m), weight (!) 137 kg (301 lb), SpO2 92 %. , Body mass index is 40.82 kg/m².,   Orthostatic Blood Pressures    Flowsheet Row Most Recent Value   Blood Pressure 138/71 filed at 08/24/2023 2030   Patient Position - Orthostatic VS Lying filed at 08/24/2023 2030            Intake/Output Summary (Last 24 hours) at 8/25/2023 3485  Last data filed at 8/24/2023 1430  Gross per 24 hour   Intake 180 ml   Output --   Net 180 ml       Physical Exam:    GEN: Nya Mittal appears well, alert and oriented x 3, pleasant and cooperative   HEENT: pupils equal, round, and reactive to light; extraocular muscles intact  NECK: supple, no carotid bruits   HEART: regular rhythm, normal S1 and S2, no murmur  LUNGS: clear to auscultation bilaterally; no wheezes, rales, or rhonchi   ABDOMEN: normal bowel sounds, soft, no tenderness, no distention  EXTREMITIES: peripheral pulses normal; no clubbing, cyanosis, or edema  NEURO: no focal findings   SKIN: normal without suspicious lesions on exposed skin    Medications:      Current Facility-Administered Medications:   •  acetaminophen (TYLENOL) tablet 975 mg, 975 mg, Oral, Q8H [address], Samy Banai, DO, 975 mg at 08/25/23 0500  •  amLODIPine (NORVASC) tablet 5 mg, 5 mg, Oral, Daily, Portillo Trevor, DO, 5 mg at 08/24/23 4591  •  ARIPiprazole (ABILIFY) tablet 10 mg, 10 mg, Oral, Daily, Bandar Trevor, DO  •  aspirin chewable tablet 81 mg, 81 mg, Oral, Daily, Bandar Trevor, DO, 81 mg at 08/24/23 1823  •  atorvastatin (LIPITOR) tablet 80 mg, 80 mg, Oral, After Jocelyn Faye, DO, 80 mg at 08/24/23 1701  •  baclofen tablet 10 mg, 10 mg, Oral, TID, Bandar Trevor, DO, 10 mg at 08/24/23 2006  •  carvedilol (COREG) tablet 12.5 mg, 12.5 mg, Oral, BID With Meals, Bandar Trevor, DO, 12.5 mg at 08/24/23 1613  •  clopidogrel (PLAVIX) tablet 75 mg, 75 mg, Oral, Daily, Portillo Trevor, DO, 75 mg at 08/24/23 2270  •  colchicine (COLCRYS) tablet 0.6 mg, 0.6 mg, Oral, BID, Neela Mera, DO, 0.6 mg at 08/24/23 1701  •  enoxaparin (LOVENOX) subcutaneous injection 40 mg, 40 mg, Subcutaneous, Daily, Portillo Trevor, DO, 40 mg at 66/00/31 2928  •  folic acid (FOLVITE) tablet 1 mg, 1 mg, Oral, Daily, Bandar Trevor, DO, 1 mg at 08/24/23 1920  •  gabapentin (NEURONTIN) capsule 600 mg, 600 mg, Oral, TID, Portillo Trevor, DO, 600 mg at 08/24/23 2006  •  HYDROmorphone (DILAUDID) injection 1 mg, 1 mg, Intravenous, Q3H PRN, Samy Myles, DO, 1 mg at 08/24/23 2026  •  HYDROmorphone (DILAUDID) tablet 4 mg, 4 mg, Oral, Q4H PRN **OR** HYDROmorphone (DILAUDID) tablet 6 mg, 6 mg, Oral, Q4H PRN, Samy Myles DO, 6 mg at 08/24/23 2319  •  ibuprofen (MOTRIN) tablet 600 mg, 600 mg, Oral, Q8H JOY, Samy Myles, DO, 600 mg at 08/25/23 0500  •  insulin lispro (HumaLOG) 100 units/mL subcutaneous injection 1-5 Units, 1-5 Units, Subcutaneous, TID AC **AND** Fingerstick Glucose (POCT), , , TID AC, Sven Ferriss, DO  •  isosorbide mononitrate (IMDUR) 24 hr tablet 60 mg, 60 mg, Oral, Daily, Sven Menard, DO, 60 mg at 08/24/23 5251  •  LORazepam (ATIVAN) tablet 0.5 mg, 0.5 mg, Oral, Once PRN, Nikita Meng PA-C  •  methocarbamol (ROBAXIN) tablet 750 mg, 750 mg, Oral, Q6H PRN, Sven Menard, DO  •  morphine (MSIR) IR tablet 30 mg, 30 mg, Oral, Q8H, Samy Myles, DO, 30 mg at 08/25/23 0456  •  nitroglycerin (NITROSTAT) SL tablet 0.4 mg, 0.4 mg, Sublingual, Q5 Min PRN, Sven Menard, DO, 0.4 mg at 08/24/23 1159  •  ondansetron (ZOFRAN) injection 4 mg, 4 mg, Intravenous, Q6H PRN, Sven Ferriss, DO, 4 mg at 08/25/23 0757  •  pantoprazole (PROTONIX) EC tablet 40 mg, 40 mg, Oral, Early Morning, Sven Ferriss, DO, 40 mg at 08/25/23 0500  •  ranolazine (RANEXA) 12 hr tablet 1,000 mg, 1,000 mg, Oral, Q12H [address], Sven Ferriss, DO, 1,000 mg at 08/24/23 2006  •  tamsulosin (FLOMAX) capsule 0.4 mg, 0.4 mg, Oral, Daily With Mick Villagran, DO, 0.4 mg at 08/24/23 1612     Labs & Results:        Results from last 7 days   Lab Units 08/25/23  0503 08/24/23  0536 08/24/23  0034 08/23/23  1152   WBC Thousand/uL 20.62* 11.52*  --  10.83*   HEMOGLOBIN g/dL 16.2 13.3  --  15.0   HEMATOCRIT % 49.3 42.8  --  46.9   PLATELETS Thousands/uL 270 230 238 235     Results from last 7 days   Lab Units 08/19/23  0436   TRIGLYCERIDES mg/dL 130   HDL mg/dL 30*     Results from last 7 days   Lab Units 08/25/23  0503 08/24/23  0536 08/23/23  1152 08/19/23  0436   POTASSIUM mmol/L 4.3 4.2 4.4 3.6   CHLORIDE mmol/L 107 108 110* 111*   CO2 mmol/L 21 23 20* 21   BUN mg/dL 23 13 10 10   CREATININE mg/dL 1.36* 1.21 1.18 1.18   CALCIUM mg/dL 9.3 8.3* 8.6 8.9   ALK PHOS U/L  --   --  63 79   ALT U/L  --   --  27 35   AST U/L  --   --  38 57*     Results from last 7 days   Lab Units 08/21/23  0514 08/20/23 2014 08/20/23  1240   PTT seconds 74* 54* 51*     Results from last 7 days   Lab Units 08/24/23  0536 08/19/23  0436   MAGNESIUM mg/dL 2.1 2.5         Counseling / Coordination of Care  Total floor / unit time spent today 25 minutes. Greater than 50% of total time was spent with the patient and / or family counseling and / or coordination of care.

## 2023-08-25 NOTE — PLAN OF CARE
Problem: PAIN - ADULT  Goal: Verbalizes/displays adequate comfort level or baseline comfort level  Description: Interventions:  - Encourage patient to monitor pain and request assistance  - Assess pain using appropriate pain scale  - Administer analgesics based on type and severity of pain and evaluate response  - Implement non-pharmacological measures as appropriate and evaluate response  - Consider cultural and social influences on pain and pain management  - Notify physician/advanced practitioner if interventions unsuccessful or patient reports new pain  Outcome: Progressing     Problem: INFECTION - ADULT  Goal: Absence or prevention of progression during hospitalization  Description: INTERVENTIONS:  - Assess and monitor for signs and symptoms of infection  - Monitor lab/diagnostic results  - Monitor all insertion sites, i.e. indwelling lines, tubes, and drains  - Monitor endotracheal if appropriate and nasal secretions for changes in amount and color  - [place] appropriate cooling/warming therapies per order  - Administer medications as ordered  - Instruct and encourage patient and family to use good hand hygiene technique  - Identify and instruct in appropriate isolation precautions for identified infection/condition  Outcome: Progressing     Problem: SAFETY ADULT  Goal: Patient will remain free of falls  Description: INTERVENTIONS:  - Educate patient/family on patient safety including physical limitations  - Instruct patient to call for assistance with activity   - Consult OT/PT to assist with strengthening/mobility   - Keep Call bell within reach  - Keep bed low and locked with side rails adjusted as appropriate  - Keep care items and personal belongings within reach  - Initiate and maintain comfort rounds  - Make Fall Risk Sign visible to staff  - Offer Toileting every *** Hours, in advance of need  - Initiate/Maintain ***alarm  - Obtain necessary fall risk management equipment: ***  - Apply yellow socks and bracelet for high fall risk patients  - Consider moving patient to room near nurses station  Outcome: Progressing  Goal: Maintain or return to baseline ADL function  Description: INTERVENTIONS:  -  Assess patient's ability to carry out ADLs; assess patient's baseline for ADL function and identify physical deficits which impact ability to perform ADLs (bathing, care of mouth/teeth, toileting, grooming, dressing, etc.)  - Assess/evaluate cause of self-care deficits   - Assess range of motion  - Assess patient's mobility; develop plan if impaired  - Assess patient's need for assistive devices and provide as appropriate  - Encourage maximum independence but intervene and supervise when necessary  - Involve family in performance of ADLs  - Assess for home care needs following discharge   - Consider OT consult to assist with ADL evaluation and planning for discharge  - Provide patient education as appropriate  Outcome: Progressing  Goal: Maintains/Returns to pre admission functional level  Description: INTERVENTIONS:  - Perform BMAT or MOVE assessment daily.   - Set and communicate daily mobility goal to care team and patient/family/caregiver. - Collaborate with rehabilitation services on mobility goals if consulted  - Perform Range of Motion *** times a day. - Reposition patient every *** hours.   - Dangle patient *** times a day  - Stand patient *** times a day  - Ambulate patient *** times a day  - Out of bed to chair *** times a day   - Out of bed for meals *** times a day  - Out of bed for toileting  - Record patient progress and toleration of activity level   Outcome: Progressing     Problem: DISCHARGE PLANNING  Goal: Discharge to home or other facility with appropriate resources  Description: INTERVENTIONS:  - Identify barriers to discharge w/patient and caregiver  - Arrange for needed discharge resources and transportation as appropriate  - Identify discharge learning needs (meds, wound care, etc.)  - Arrange for interpretive services to assist at discharge as needed  - Refer to Case Management Department for coordinating discharge planning if the patient needs post-hospital services based on physician/advanced practitioner order or complex needs related to functional status, cognitive ability, or social support system  Outcome: Progressing     Problem: Knowledge Deficit  Goal: Patient/family/caregiver demonstrates understanding of disease process, treatment plan, medications, and discharge instructions  Description: Complete learning assessment and assess knowledge base.   Interventions:  - Provide teaching at level of understanding  - Provide teaching via preferred learning methods  Outcome: Progressing     Problem: CARDIOVASCULAR - ADULT  Goal: Maintains optimal cardiac output and hemodynamic stability  Description: INTERVENTIONS:  - Monitor I/O, vital signs and rhythm  - Monitor for S/S and trends of decreased cardiac output  - Administer and titrate ordered vasoactive medications to optimize hemodynamic stability  - Assess quality of pulses, skin color and temperature  - Assess for signs of decreased coronary artery perfusion  - Instruct patient to report change in severity of symptoms  Outcome: Progressing  Goal: Absence of cardiac dysrhythmias or at baseline rhythm  Description: INTERVENTIONS:  - Continuous cardiac monitoring, vital signs, obtain 12 lead EKG if ordered  - Administer antiarrhythmic and heart rate control medications as ordered  - Monitor electrolytes and administer replacement therapy as ordered  Outcome: Progressing     Problem: MOBILITY - ADULT  Goal: Maintain or return to baseline ADL function  Description: INTERVENTIONS:  -  Assess patient's ability to carry out ADLs; assess patient's baseline for ADL function and identify physical deficits which impact ability to perform ADLs (bathing, care of mouth/teeth, toileting, grooming, dressing, etc.)  - Assess/evaluate cause of self-care deficits   - Assess range of motion  - Assess patient's mobility; develop plan if impaired  - Assess patient's need for assistive devices and provide as appropriate  - Encourage maximum independence but intervene and supervise when necessary  - Involve family in performance of ADLs  - Assess for home care needs following discharge   - Consider OT consult to assist with ADL evaluation and planning for discharge  - Provide patient education as appropriate  Outcome: Progressing  Goal: Maintains/Returns to pre admission functional level  Description: INTERVENTIONS:  - Perform BMAT or MOVE assessment daily.   - Set and communicate daily mobility goal to care team and patient/family/caregiver. - Collaborate with rehabilitation services on mobility goals if consulted  - Perform Range of Motion *** times a day. - Reposition patient every *** hours.   - Dangle patient *** times a day  - Stand patient *** times a day  - Ambulate patient *** times a day  - Out of bed to chair *** times a day   - Out of bed for meals *** times a day  - Out of bed for toileting  - Record patient progress and toleration of activity level   Outcome: Progressing

## 2023-08-25 NOTE — PLAN OF CARE
Problem: PAIN - ADULT  Goal: Verbalizes/displays adequate comfort level or baseline comfort level  Description: Interventions:  - Encourage patient to monitor pain and request assistance  - Assess pain using appropriate pain scale  - Administer analgesics based on type and severity of pain and evaluate response  - Implement non-pharmacological measures as appropriate and evaluate response  - Consider cultural and social influences on pain and pain management  - Notify physician/advanced practitioner if interventions unsuccessful or patient reports new pain  Outcome: Progressing     Problem: INFECTION - ADULT  Goal: Absence or prevention of progression during hospitalization  Description: INTERVENTIONS:  - Assess and monitor for signs and symptoms of infection  - Monitor lab/diagnostic results  - Monitor all insertion sites, i.e. indwelling lines, tubes, and drains  - Monitor endotracheal if appropriate and nasal secretions for changes in amount and color  - [place] appropriate cooling/warming therapies per order  - Administer medications as ordered  - Instruct and encourage patient and family to use good hand hygiene technique  - Identify and instruct in appropriate isolation precautions for identified infection/condition  Outcome: Progressing       Problem: MOBILITY - ADULT  Goal: Maintain or return to baseline ADL function  Description: INTERVENTIONS:  -  Assess patient's ability to carry out ADLs; assess patient's baseline for ADL function and identify physical deficits which impact ability to perform ADLs (bathing, care of mouth/teeth, toileting, grooming, dressing, etc.)  - Assess/evaluate cause of self-care deficits   - Assess range of motion  - Assess patient's mobility; develop plan if impaired  - Assess patient's need for assistive devices and provide as appropriate  - Encourage maximum independence but intervene and supervise when necessary  - Involve family in performance of ADLs  - Assess for home care needs following discharge   - Consider OT consult to assist with ADL evaluation and planning for discharge  - Provide patient education as appropriate  Outcome: Progressing

## 2023-08-25 NOTE — PROGRESS NOTES
[address] [facility], [place]  Progress Note  Name: Mathew Tirado  MRN: [id number]  Unit/Bed#: [facility] 522-01 I Date of Admission: 8/23/2023   Date of Service: 8/25/2023 I Hospital Day: 2    Assessment/Plan   History of gastric sleeve procedure  Assessment & Plan  History of gastric sleeve procedure noted    Major depressive disorder, recurrent, moderate (HCC)  Assessment & Plan  Continue Abilify    Type 2 diabetes mellitus with diabetic neuropathy, with long-term current use of insulin [facility])  Assessment & Plan  Lab Results   Component Value Date    HGBA1C 5.3 07/14/2022       Recent Labs     08/23/23  2316 08/24/23  0546 08/24/23  1049 08/25/23  1107   POCGLU 113 117 106 121       Blood Sugar Average: Last 72 hrs:  (P) 114.25   Not on  meds at home  Monitor    Opioid dependence, continuous (HCC)  Assessment & Plan  • Chronic pain syndrome  • Morphine administration thru intrathecal pain pump  • We will continue on oral Dilaudid and IV Dilaudid  •     GERD (gastroesophageal reflux disease)  Assessment & Plan  Continue PPI    CVA (cerebral vascular accident) [facility])  Assessment & Plan  History of CVA in 2005  Continue aspirin and statin    Morbid obesity ([address])  Assessment & Plan  With history of prior gastric sleeve    * Chest pain  Assessment & Plan  Patient presented with recurrent chest pain. Recent discharge from [address] [place] [address] on 8/21/23  with Recent cardiac catheterization that did not reveal any critical lesions and medical management was recommended. Patient was evaluated by cardiology at [facility]  Per cardiology EKG with some anterior ST depressions on the initial ECG which resolved on subsequent EKGs. His troponins are mildly elevated and flat without significant change.     Cardiology recommended transfer to [address] [place] [address] for cardiac MRI/ further management to assess for myopericarditis   Continue aspirin, statin, Plavix, beta-blocker, Ranexa and Imdur  Consult cardiology for further management    23: Patient is having significant nausea and vomiting -possibly from colchicine which was initiated yesterday we will hold, will also hold his morphine and continue with oral Dilaudid and IV Dilaudid for breakthrough pain  Change from ibuprofen to IV Toradol 15 mg tid for 24 hours  Continue on IV Protonix, continue on fluids  Appreciate cardiology recommendations                     VTE Pharmacologic Prophylaxis: VTE Score: 6 High Risk (Score >/= 5) - Pharmacological DVT Prophylaxis Ordered: enoxaparin (Lovenox). Sequential Compression Devices Ordered. Patient Centered Rounds: I performed bedside rounds with nursing staff today. Discussions with Specialists or Other Care Team Provider: cards    Education and Discussions with Family / Patient: Patient declined call to . Total Time Spent on Date of Encounter in care of patient: 35 minutes This time was spent on one or more of the following: performing physical exam; counseling and coordination of care; obtaining or reviewing history; documenting in the medical record; reviewing/ordering tests, medications or procedures; communicating with other healthcare professionals and discussing with patient's family/caregivers. Current Length of Stay: 2 day(s)  Current Patient Status: Inpatient   Certification Statement: The patient will continue to require additional inpatient hospital stay due to pending medical mangaement  Discharge Plan: Anticipate discharge in 48 hrs to discharge location to be determined pending rehab evaluations.     Code Status: Level 1 - Full Code    Subjective:   Patient reports intermittent chest pain worse when laying flat and improved when sitting upright, is having nausea and vomiting since being given colchicine last evening    Objective:     Vitals:   Temp (24hrs), Av.7 °F (37.1 °C), Min:98.4 °F (36.9 °C), Max:99 °F (37.2 °C)    Temp:  [98.4 °F (36.9 °C)-99 °F (37.2 °C)] 98.7 °F (37.1 °C)  HR:  [56-73] 72  Resp:  [18-25] 25  BP: (138-139)/(68-71) 139/68  SpO2:  [92 %-94 %] 93 %  Body mass index is 40.82 kg/m². Input and Output Summary (last 24 hours):   No intake or output data in the 24 hours ending 08/25/23 1619    Physical Exam:   Physical Exam  Vitals reviewed. Constitutional:       Appearance: Normal appearance. He is obese. He is not ill-appearing. HENT:      Head: Normocephalic and atraumatic. Mouth/Throat:      Mouth: Mucous membranes are moist.      Pharynx: No oropharyngeal exudate. Eyes:      General: No scleral icterus. Extraocular Movements: Extraocular movements intact. Cardiovascular:      Rate and Rhythm: Normal rate and regular rhythm. Pulses: Normal pulses. Heart sounds: Normal heart sounds. No murmur heard. Pulmonary:      Effort: Pulmonary effort is normal. No respiratory distress. Breath sounds: Normal breath sounds. No wheezing. Abdominal:      General: Bowel sounds are normal. There is no distension. Palpations: Abdomen is soft. Tenderness: There is no abdominal tenderness. Comments: Left lower quadrant morphine pump in place   Musculoskeletal:         General: Normal range of motion. Cervical back: Normal range of motion and neck supple. Right lower leg: No edema. Left lower leg: No edema. Skin:     General: Skin is warm and dry. Neurological:      General: No focal deficit present. Mental Status: He is alert and oriented to person, place, and time. Cranial Nerves: No cranial nerve deficit.    Psychiatric:         Mood and Affect: Mood normal.          Additional Data:     Labs:  Results from last 7 days   Lab Units 08/25/23  0503   WBC Thousand/uL 20.62*   HEMOGLOBIN g/dL 16.2   HEMATOCRIT % 49.3   PLATELETS Thousands/uL 270   NEUTROS PCT % 88*   LYMPHS PCT % 5*   MONOS PCT % 5   EOS PCT % 1     Results from last 7 days   Lab Units 08/25/23  0503 08/24/23  0536 08/23/23  1152 SODIUM mmol/L 138   < > 138   POTASSIUM mmol/L 4.3   < > 4.4   CHLORIDE mmol/L 107   < > 110*   CO2 mmol/L 21   < > 20*   BUN mg/dL 23   < > 10   CREATININE mg/dL 1.36*   < > 1.18   ANION GAP mmol/L 10   < > 8   CALCIUM mg/dL 9.3   < > 8.6   ALBUMIN g/dL  --   --  3.8   TOTAL BILIRUBIN mg/dL  --   --  0.60   ALK PHOS U/L  --   --  63   ALT U/L  --   --  27   AST U/L  --   --  38   GLUCOSE RANDOM mg/dL 131   < > 109    < > = values in this interval not displayed. Results from last 7 days   Lab Units 08/25/23  1107 08/24/23  1049 08/24/23  0546 08/23/23  2316   POC GLUCOSE mg/dl 121 106 117 113               Lines/Drains:  Invasive Devices     Peripheral Intravenous Line  Duration           Peripheral IV 08/23/23 Left Antecubital 2 days    Peripheral IV 08/25/23 Dorsal (posterior); Right Forearm <1 day                  Telemetry:  Telemetry Orders (From admission, onward)             24 Hour Telemetry Monitoring  Continuous x 24 Hours (Telem)        Question:  Reason for 24 Hour Telemetry  Answer:  Patients with talib/shae/endocarditis; cardiac contusion                              Imaging: No pertinent imaging reviewed.     Recent Cultures (last 7 days):         Last 24 Hours Medication List:   Current Facility-Administered Medications   Medication Dose Route Frequency Provider Last Rate   • acetaminophen  975 mg Oral Q8H [address] Samy Ban, DO     • amLODIPine  5 mg Oral Daily [place] Craver, DO     • ARIPiprazole  10 mg Oral Daily [place] Craver, DO     • aspirin  81 mg Oral Daily [place] Craver, DO     • atorvastatin  80 mg Oral After Perfecto Bless, DO     • baclofen  10 mg Oral TID Janice Maya, DO     • carvedilol  12.5 mg Oral BID With Meals Janice Craver, DO     • enoxaparin  40 mg Subcutaneous Daily Janice Maya, DO     • folic acid  1 mg Oral Daily [place] Maya, DO     • gabapentin  600 mg Oral TID [place] Maya, DO     • HYDROmorphone  1 mg Intravenous Q3H PRN Samysharonda Myles, DO     • HYDROmorphone  6 mg Oral Q4H PRN Samy Banai, DO      Or   • HYDROmorphone  8 mg Oral Q4H PRN Samy Banai, DO     • insulin lispro  1-5 Units Subcutaneous TID AC Trygve Lyn, DO     • isosorbide mononitrate  60 mg Oral Daily Trygve Lyn, DO     • ketorolac  15 mg Intravenous Q8H [address] Samy Banai, DO     • LORazepam  0.5 mg Oral Once PRN Jaime Chavira PA-C     • methocarbamol  750 mg Oral Q6H PRN Trygve Lyn, DO     • multi-electrolyte  125 mL/hr Intravenous Continuous Samy Banai,  mL/hr (08/25/23 1010)   • nitroglycerin  0.4 mg Sublingual Q5 Min PRN Trygve Lyn, DO     • ondansetron  4 mg Intravenous Q6H PRN Trygve Lyn, DO     • pantoprazole  40 mg Intravenous Q24H [address] Samy Banai, DO     • ranolazine  1,000 mg Oral Q12H [address] Trygve Lyn, DO     • tamsulosin  0.4 mg Oral Daily With Nic oCrtez DO          Today, Patient Was Seen By: Demetris Rodriguez DO    **Please Note: This note may have been constructed using a voice recognition system. ** Postop Diagnosis: same

## 2023-08-25 NOTE — CASE MANAGEMENT
Case Management Assessment    Patient name Shantelle Shoulder  Location [facility] 522/[facility] 635-02 MRN [id number]  : 1960 Date 2023       Current Admission Date: 2023  Current Admission Diagnosis:Chest pain   Patient Active Problem List    Diagnosis Date Noted   • History of gastric sleeve procedure 2023   • Status post insertion of spinal cord stimulator 2021   • Skin inflammation 2020   • Aftercare following surgery 2020   • Radiculopathy, lumbosacral region 2020   • Neuropathy 2020   • Presence of intrathecal pump 2020   • Malfunction of intrathecal infusion pump 2020   • Preoperative examination 2020   • Major depressive disorder, recurrent, moderate ([address]) 2020   • COPD (chronic obstructive pulmonary disease) ([address]) 2020   • Type 2 diabetes mellitus with diabetic neuropathy, with long-term current use of insulin ([address]) 10/18/2019   • Mild cognitive impairment 10/18/2019   • Primary osteoarthritis of both knees 2019   • Orthostatic hypotension 2019   • Bilateral foot pain 2019   • Symptomatic bradycardia 2019   • Pressure injury of skin of sacral region 2019   • Pain and swelling of left knee 2019   • Fall at home 2019   • Vitamin D deficiency 2018   • Chronic migraine without aura without status migrainosus, not intractable 2018   • Esophageal dysphagia 2018   • Opioid dependence, continuous ([address]) 2017   • GERD (gastroesophageal reflux disease)    • Chest pain 10/12/2017   • Stage 3 chronic kidney disease ([address]) 10/12/2017   • Bilateral leg edema 2017   • Chronic respiratory failure ([address]) 2017   • Alzheimer disease ([address])    • Chronic pain disorder    • Other constipation    • Coronary artery disease    • Sleep apnea    • Stroke [facility])    • Stented coronary artery    • Obstructive sleep apnea syndrome    • CPAP (continuous positive airway pressure) dependence    • Brain aneurysm    • Morbid obesity ([address] [facility]) 01/15/2016   • CVA (cerebral vascular accident) ([address] [facility]) 01/15/2016   • Coronary artery disease involving native coronary artery 01/14/2016   • Type 2 diabetes mellitus with renal complication (HCC)    • Chronic diastolic congestive heart failure ([address] [facility])    • Hyperlipidemia 02/09/2015      LOS (days): 2  Geometric Mean LOS (GMLOS) (days): 1.70  Days to GMLOS:0     OBJECTIVE:  PATIENT READMITTED TO HOSPITAL  Pt is <30-day readmit. Pt's last admit was 8/18/23 - 8/21/23 at [facility].   Risk of Unplanned Readmission Score: 22.52   Current admission status: Inpatient    Preferred Pharmacy:   [address] [place] [address],[address]  Phone: [phone] Fax: [phone]    Primary Care Provider: Leigh Romero MD    Primary Insurance: MEDICARE  Secondary Insurance: [address] [place] [address]:  [facility] [employer]     [place], [address] [facility] - Spouse   Primary Phone: [phone] (Mobile)           Advance Directives  Does patient have a [address]?: No  Does patient have Advance Directives?: No  Primary Contact: pt's wife Guanaco Piña / phone# [phone]    Readmission Root Cause  30 Day Readmission: Yes  Who directed you to return to the hospital?: Self  Did you understand whom to contact if you had questions or problems?: Yes  Did you get your prescriptions before you left the hospital?: Yes  Were you able to get your prescriptions filled when you left the hospital?: Yes  Did you take your medications as prescribed?: Yes  Were you able to get to your follow-up appointments?: Yes  During previous admission, was a post-acute recommendation made?: No  Patient was readmitted due to: chest pain  Action Plan: TBD    Patient Information  Admitted from[de-identified] Home  Mental Status: Alert  Assessment information provided by[de-identified] Patient  Primary Caregiver: Self  Support Systems: Spouse/significant other  [place] of Residence: [address] do you live in?: [place], [facility] entry access options.  Select all that apply.: No steps to enter home  Type of Current Residence: [facility]  Upon entering residence, is there a bedroom on the main floor (no further steps)?: Yes  Upon entering residence, is there a bathroom on the main floor (no further steps)?: Yes  In the last 12 months, was there a time when you were not able to pay the mortgage or rent on time?: No  In the last 12 months, how many places have you lived?: 1  In the last 12 months, was there a time when you did not have a steady place to sleep or slept in a shelter (including now)?: No  Homeless/housing insecurity resource given?: N/A  Living Arrangements: Lives w/ Spouse/significant other  Is patient a ?: No    Activities of Daily Living Prior to Admission  Functional Status: Independent  Completes ADLs independently?: Yes  Ambulates independently?: Yes  Does patient use assisted devices?: No  Does patient currently own DME?: Yes  What DME does the patient currently own?: [employer] [place] [place]  Does the patient have a history of Short-Term Rehab?: No  Does patient have a history of HHC?: No    Patient Information Continued  Income Source: SSI/SSD  Does patient have prescription coverage?: Yes  Within the past 12 months, you worried that your food would run out before you got the money to buy more.: Never true  Within the past 12 months, the food you bought just didn't last and you didn't have money to get more.: Never true  Food insecurity resource given?: N/A  Does patient receive dialysis treatments?: No  Does patient have a history of substance abuse?: No  Does patient have a history of Mental Health Diagnosis?: Yes (Pt has Dx of Major Depression which is managed by his PCP.)  Is patient receiving treatment for mental health?: Yes  Has patient received inpatient treatment related to mental health in the last 2 years?: No    Means of Transportation  Means of Transport to Appts[de-identified] Drives Self  In the past 12 months, has lack of transportation kept you from medical appointments or from getting medications?: No  In the past 12 months, has lack of transportation kept you from meetings, work, or from getting things needed for daily living?: No  Was application for public transport provided?: N/A     Additional Comments: CM reviewed d/c planning process including the following: identifying help at home, patient preference for d/c planning needs, Discharge Lounge, [facility] Meds to Bed program, availability of treatment team to discuss questions or concerns patient and/or family may have regarding understanding medications and recognizing signs and symptoms once discharged. CM also encouraged patient to follow up with all recommended appointments after discharge. Patient advised of importance for patient and family to participate in managing patient’s medical well being. Patient/caregiver received discharge checklist. Content reviewed. Patient/caregiver encouraged to participate in discharge plan of care prior to discharge home.

## 2023-08-26 LAB
ANION GAP SERPL CALCULATED.3IONS-SCNC: 7 MMOL/L
BASOPHILS # BLD AUTO: 0.04 THOUSANDS/ÂΜL (ref 0–0.1)
BASOPHILS NFR BLD AUTO: 0 % (ref 0–1)
BUN SERPL-MCNC: 20 MG/DL (ref 5–25)
CALCIUM SERPL-MCNC: 7.7 MG/DL (ref 8.4–10.2)
CHLORIDE SERPL-SCNC: 108 MMOL/L (ref 96–108)
CO2 SERPL-SCNC: 23 MMOL/L (ref 21–32)
CREAT SERPL-MCNC: 1.07 MG/DL (ref 0.6–1.3)
EOSINOPHIL # BLD AUTO: 0.25 THOUSAND/ÂΜL (ref 0–0.61)
EOSINOPHIL NFR BLD AUTO: 3 % (ref 0–6)
ERYTHROCYTE [DISTWIDTH] IN BLOOD BY AUTOMATED COUNT: 13.2 % (ref 11.6–15.1)
GFR SERPL CREATININE-BSD FRML MDRD: 75 ML/MIN/1.73SQ M
GLUCOSE SERPL-MCNC: 108 MG/DL (ref 65–140)
HCT VFR BLD AUTO: 38.6 % (ref 36.5–49.3)
HGB BLD-MCNC: 12.8 G/DL (ref 12–17)
IMM GRANULOCYTES # BLD AUTO: 0.05 THOUSAND/UL (ref 0–0.2)
IMM GRANULOCYTES NFR BLD AUTO: 1 % (ref 0–2)
LYMPHOCYTES # BLD AUTO: 1.75 THOUSANDS/ÂΜL (ref 0.6–4.47)
LYMPHOCYTES NFR BLD AUTO: 18 % (ref 14–44)
MCH RBC QN AUTO: 31 PG (ref 26.8–34.3)
MCHC RBC AUTO-ENTMCNC: 33.2 G/DL (ref 31.4–37.4)
MCV RBC AUTO: 94 FL (ref 82–98)
MONOCYTES # BLD AUTO: 0.74 THOUSAND/ÂΜL (ref 0.17–1.22)
MONOCYTES NFR BLD AUTO: 8 % (ref 4–12)
NEUTROPHILS # BLD AUTO: 6.73 THOUSANDS/ÂΜL (ref 1.85–7.62)
NEUTS SEG NFR BLD AUTO: 70 % (ref 43–75)
NRBC BLD AUTO-RTO: 0 /100 WBCS
PLATELET # BLD AUTO: 214 THOUSANDS/UL (ref 149–390)
PMV BLD AUTO: 10.4 FL (ref 8.9–12.7)
POTASSIUM SERPL-SCNC: 3.5 MMOL/L (ref 3.5–5.3)
RBC # BLD AUTO: 4.13 MILLION/UL (ref 3.88–5.62)
SODIUM SERPL-SCNC: 138 MMOL/L (ref 135–147)
WBC # BLD AUTO: 9.56 THOUSAND/UL (ref 4.31–10.16)

## 2023-08-26 PROCEDURE — 99232 SBSQ HOSP IP/OBS MODERATE 35: CPT | Performed by: INTERNAL MEDICINE

## 2023-08-26 PROCEDURE — 93005 ELECTROCARDIOGRAM TRACING: CPT

## 2023-08-26 PROCEDURE — 85025 COMPLETE CBC W/AUTO DIFF WBC: CPT | Performed by: INTERNAL MEDICINE

## 2023-08-26 PROCEDURE — C9113 INJ PANTOPRAZOLE SODIUM, VIA: HCPCS | Performed by: INTERNAL MEDICINE

## 2023-08-26 PROCEDURE — 80048 BASIC METABOLIC PNL TOTAL CA: CPT | Performed by: INTERNAL MEDICINE

## 2023-08-26 RX ORDER — IBUPROFEN 600 MG/1
600 TABLET ORAL EVERY 8 HOURS SCHEDULED
Status: DISCONTINUED | OUTPATIENT
Start: 2023-08-26 | End: 2023-08-28

## 2023-08-26 RX ORDER — HYDROMORPHONE HYDROCHLORIDE 2 MG/ML
1.2 INJECTION, SOLUTION INTRAMUSCULAR; INTRAVENOUS; SUBCUTANEOUS
Status: DISCONTINUED | OUTPATIENT
Start: 2023-08-26 | End: 2023-08-31 | Stop reason: HOSPADM

## 2023-08-26 RX ORDER — BISACODYL 5 MG/1
5 TABLET, DELAYED RELEASE ORAL DAILY PRN
Status: DISCONTINUED | OUTPATIENT
Start: 2023-08-26 | End: 2023-08-28

## 2023-08-26 RX ADMIN — BACLOFEN 10 MG: 10 TABLET ORAL at 09:01

## 2023-08-26 RX ADMIN — GABAPENTIN 600 MG: 300 CAPSULE ORAL at 09:00

## 2023-08-26 RX ADMIN — PANTOPRAZOLE SODIUM 40 MG: 40 INJECTION, POWDER, FOR SOLUTION INTRAVENOUS at 09:01

## 2023-08-26 RX ADMIN — ATORVASTATIN CALCIUM 80 MG: 80 TABLET, FILM COATED ORAL at 17:22

## 2023-08-26 RX ADMIN — ASPIRIN 81 MG CHEWABLE TABLET 81 MG: 81 TABLET CHEWABLE at 09:00

## 2023-08-26 RX ADMIN — ACETAMINOPHEN 975 MG: 325 TABLET, FILM COATED ORAL at 05:06

## 2023-08-26 RX ADMIN — RANOLAZINE 1000 MG: 500 TABLET, FILM COATED, EXTENDED RELEASE ORAL at 21:35

## 2023-08-26 RX ADMIN — IBUPROFEN 600 MG: 600 TABLET ORAL at 21:35

## 2023-08-26 RX ADMIN — GABAPENTIN 600 MG: 300 CAPSULE ORAL at 21:36

## 2023-08-26 RX ADMIN — CARVEDILOL 12.5 MG: 12.5 TABLET, FILM COATED ORAL at 17:22

## 2023-08-26 RX ADMIN — BISACODYL 5 MG: 5 TABLET, COATED ORAL at 21:36

## 2023-08-26 RX ADMIN — IBUPROFEN 600 MG: 600 TABLET ORAL at 13:15

## 2023-08-26 RX ADMIN — ENOXAPARIN SODIUM 40 MG: 40 INJECTION SUBCUTANEOUS at 09:01

## 2023-08-26 RX ADMIN — ACETAMINOPHEN 975 MG: 325 TABLET, FILM COATED ORAL at 21:36

## 2023-08-26 RX ADMIN — BACLOFEN 10 MG: 10 TABLET ORAL at 17:22

## 2023-08-26 RX ADMIN — ISOSORBIDE MONONITRATE 60 MG: 60 TABLET, EXTENDED RELEASE ORAL at 09:00

## 2023-08-26 RX ADMIN — SODIUM CHLORIDE, SODIUM GLUCONATE, SODIUM ACETATE, POTASSIUM CHLORIDE, MAGNESIUM CHLORIDE, SODIUM PHOSPHATE, DIBASIC, AND POTASSIUM PHOSPHATE 125 ML/HR: .53; .5; .37; .037; .03; .012; .00082 INJECTION, SOLUTION INTRAVENOUS at 03:38

## 2023-08-26 RX ADMIN — BACLOFEN 10 MG: 10 TABLET ORAL at 21:36

## 2023-08-26 RX ADMIN — RANOLAZINE 1000 MG: 500 TABLET, FILM COATED, EXTENDED RELEASE ORAL at 09:00

## 2023-08-26 RX ADMIN — HYDROMORPHONE HYDROCHLORIDE 8 MG: 2 TABLET ORAL at 21:36

## 2023-08-26 RX ADMIN — CARVEDILOL 12.5 MG: 12.5 TABLET, FILM COATED ORAL at 09:00

## 2023-08-26 RX ADMIN — HYDROMORPHONE HYDROCHLORIDE 8 MG: 2 TABLET ORAL at 17:23

## 2023-08-26 RX ADMIN — FOLIC ACID 1 MG: 1 TABLET ORAL at 09:00

## 2023-08-26 RX ADMIN — IBUPROFEN 600 MG: 600 TABLET ORAL at 09:00

## 2023-08-26 RX ADMIN — TAMSULOSIN HYDROCHLORIDE 0.4 MG: 0.4 CAPSULE ORAL at 17:22

## 2023-08-26 RX ADMIN — AMLODIPINE BESYLATE 5 MG: 5 TABLET ORAL at 09:01

## 2023-08-26 RX ADMIN — GABAPENTIN 600 MG: 300 CAPSULE ORAL at 17:22

## 2023-08-26 RX ADMIN — HYDROMORPHONE HYDROCHLORIDE 8 MG: 2 TABLET ORAL at 10:24

## 2023-08-26 RX ADMIN — HYDROMORPHONE HYDROCHLORIDE 8 MG: 2 TABLET ORAL at 06:21

## 2023-08-26 RX ADMIN — ACETAMINOPHEN 975 MG: 325 TABLET, FILM COATED ORAL at 13:15

## 2023-08-26 NOTE — ASSESSMENT & PLAN NOTE
Patient presented with recurrent chest pain. Recent discharge from [address] [place] [address] on 8/21/23  with Recent cardiac catheterization that did not reveal any critical lesions and medical management was recommended. Patient was evaluated by cardiology at [facility]  Per cardiology EKG with some anterior ST depressions on the initial ECG which resolved on subsequent EKGs. His troponins are mildly elevated and flat without significant change. Cardiology recommended transfer to [address] [place] [address] for cardiac MRI/ further management to assess for myopericarditis   Continue aspirin, statin, Plavix, beta-blocker, Ranexa and Imdur  Consult cardiology for further management    8/25/23: Patient is having significant nausea and vomiting -possibly from colchicine which was initiated yesterday we will hold, will also hold his morphine and continue with oral Dilaudid and IV Dilaudid for breakthrough pain  Change from ibuprofen to IV Toradol 15 mg tid for 24 hours  Continue on IV Protonix, continue on fluids  Appreciate cardiology recommendations      8/26/23: Patient's nausea and vomiting significantly improved yesterday throughout the day and today has resolved, we will continue to hold his colchicine  Continue current pain management in regards to opioid dosing, cardiology has adjusted from Toradol to ibuprofen now that patient is tolerating oral intake we will monitor closely if patient has worsening renal function again would recommend decreasing ibuprofen to 400 mg 3 times daily as this will is the most effective dose in regards to pain ceiling for ibuprofen.    Will monitor off fluids today  Continue ppi

## 2023-08-26 NOTE — PROGRESS NOTES
Cardiology Progress Note - Mango Omalley 63 y.o. male MRN: [id number]    Unit/Bed#: [facility] 522-01 Encounter: [id number]      Assessment:  1. Chest pain consistent with myopericarditis   2. CAD s/p PCI/stents to LAD, LCX, RCA  3. Preserved LV systolic function   4. Benign essential hypertension  5. Dyslipidemia   6. DM type 2  7. Morbid obesity - BMI 41    Plan:  1. Chest pain better controlled today, still present but able to lie flat and take deep breaths  2. Stop IV Toradol and reintroduce ibuprofen 600 mg TID  3. IV PPI added, history of prior gastric bypass  4. Cardiac MRI pending  5. Hold colchicine today, reintroduce in a.m. if tolerating NSAIDs  6.  Echo yesterday with no effusion  7. Discontinue Plavix while on ibuprofen  8. Continue beta-blocker and statin therapy when able to tolerate oral medication   9. Continue to monitor renal function while on high-dose NSAIDs    Subjective:   Patient seen and examined. Feeling better. Still with pleuritic chest pain but improved since yesterday. No further nausea. Objective:     Vitals: Blood pressure (!) 124/49, pulse 56, temperature 98.6 °F (37 °C), resp. rate 18, height 6' (1.829 m), weight (!) 137 kg (301 lb), SpO2 94 %. , Body mass index is 40.82 kg/m².,   Orthostatic Blood Pressures    Flowsheet Row Most Recent Value   Blood Pressure 124/49 filed at 08/26/2023 0246   Patient Position - Orthostatic VS Sitting filed at 08/25/2023 1904            Intake/Output Summary (Last 24 hours) at 8/26/2023 0720  Last data filed at 8/26/2023 0257  Gross per 24 hour   Intake 1949.25 ml   Output 0 ml   Net 1949.25 ml       Physical Exam:    GEN: Mnago Omalley appears well, alert and oriented x 3, pleasant and cooperative   HEENT: pupils equal, round, and reactive to light; extraocular muscles intact  NECK: supple, no carotid bruits   HEART: regular rhythm, normal S1 and S2, no murmur  LUNGS: clear to auscultation bilaterally; no wheezes, rales, or rhonchi   ABDOMEN: normal bowel sounds, soft, no tenderness, no distention  EXTREMITIES: peripheral pulses normal; no clubbing, cyanosis, or edema  NEURO: no focal findings   SKIN: normal without suspicious lesions on exposed skin    Medications:      Current Facility-Administered Medications:   •  acetaminophen (TYLENOL) tablet 975 mg, 975 mg, Oral, Q8H [facility], Samy Banai, DO, 975 mg at 08/26/23 6440  •  amLODIPine (NORVASC) tablet 5 mg, 5 mg, Oral, Daily, Doyce Seashore, DO, 5 mg at 08/24/23 8138  •  ARIPiprazole (ABILIFY) tablet 10 mg, 10 mg, Oral, Daily, Doyce Seashore, DO  •  aspirin chewable tablet 81 mg, 81 mg, Oral, Daily, Doyce Seashore, DO, 81 mg at 08/24/23 4999  •  atorvastatin (LIPITOR) tablet 80 mg, 80 mg, Oral, After Marigene , DO, 80 mg at 08/25/23 1829  •  baclofen tablet 10 mg, 10 mg, Oral, TID, Doyce Seashore, DO, 10 mg at 08/25/23 2144  •  carvedilol (COREG) tablet 12.5 mg, 12.5 mg, Oral, BID With Meals, Doyce Seashore, DO, 12.5 mg at 08/25/23 1713  •  enoxaparin (LOVENOX) subcutaneous injection 40 mg, 40 mg, Subcutaneous, Daily, Doyce Seashore, DO, 40 mg at 83/02/68 3052  •  folic acid (FOLVITE) tablet 1 mg, 1 mg, Oral, Daily, Doyce Seashore, DO, 1 mg at 08/24/23 3570  •  gabapentin (NEURONTIN) capsule 600 mg, 600 mg, Oral, TID, Doyce Seashore, DO, 600 mg at 08/25/23 2143  •  HYDROmorphone (DILAUDID) injection 1 mg, 1 mg, Intravenous, Q3H PRN, Samy Banai, DO, 1 mg at 08/25/23 9951  •  HYDROmorphone (DILAUDID) tablet 6 mg, 6 mg, Oral, Q4H PRN **OR** HYDROmorphone (DILAUDID) tablet 8 mg, 8 mg, Oral, Q4H PRN, Samy Banai, DO, 8 mg at 08/26/23 8564  •  insulin lispro (HumaLOG) 100 units/mL subcutaneous injection 1-5 Units, 1-5 Units, Subcutaneous, TID AC **AND** Fingerstick Glucose (POCT), , , TID AC, Leanna Pleitez DO  •  isosorbide mononitrate (IMDUR) 24 hr tablet 60 mg, 60 mg, Oral, Daily, Leanna Pleitez DO, 60 mg at 08/24/23 5675  •  LORazepam (ATIVAN) tablet 0.5 mg, 0.5 mg, Oral, Once PRN, Jaime Chavira PA-C  •  methocarbamol (ROBAXIN) tablet 750 mg, 750 mg, Oral, Q6H PRN, Trygve Lyn, DO  •  multi-electrolyte (PLASMALYTE-A/ISOLYTE-S PH 7.4) IV solution, 125 mL/hr, Intravenous, Continuous, Samy Banai, DO, Last Rate: 125 mL/hr at 08/26/23 0338, 125 mL/hr at 08/26/23 0338  •  nitroglycerin (NITROSTAT) SL tablet 0.4 mg, 0.4 mg, Sublingual, Q5 Min PRN, Trygve Lyn, DO, 0.4 mg at 08/24/23 1159  •  ondansetron (ZOFRAN) injection 4 mg, 4 mg, Intravenous, Q6H PRN, Trygve Lyn, DO, 4 mg at 08/25/23 0757  •  pantoprazole (PROTONIX) injection 40 mg, 40 mg, Intravenous, Q24H [address], Samy Banai, DO, 40 mg at 08/25/23 1004  •  ranolazine (RANEXA) 12 hr tablet 1,000 mg, 1,000 mg, Oral, Q12H [address], Trygve Lyn, DO, 1,000 mg at 08/25/23 2144  •  tamsulosin (FLOMAX) capsule 0.4 mg, 0.4 mg, Oral, Daily With Nic Ball, DO, 0.4 mg at 08/25/23 1714     Labs & Results:        Results from last 7 days   Lab Units 08/25/23  0503 08/24/23  0536 08/24/23  0034 08/23/23  1152   WBC Thousand/uL 20.62* 11.52*  --  10.83*   HEMOGLOBIN g/dL 16.2 13.3  --  15.0   HEMATOCRIT % 49.3 42.8  --  46.9   PLATELETS Thousands/uL 270 230 238 235         Results from last 7 days   Lab Units 08/26/23  0452 08/25/23  0503 08/24/23  0536 08/23/23  1152   POTASSIUM mmol/L 3.5 4.3 4.2 4.4   CHLORIDE mmol/L 108 107 108 110*   CO2 mmol/L 23 21 23 20*   BUN mg/dL 20 23 13 10   CREATININE mg/dL 1.07 1.36* 1.21 1.18   CALCIUM mg/dL 7.7* 9.3 8.3* 8.6   ALK PHOS U/L  --   --   --  63   ALT U/L  --   --   --  27   AST U/L  --   --   --  38     Results from last 7 days   Lab Units 08/21/23  0514 08/20/23 2014 08/20/23  1240   PTT seconds 74* 54* 51*     Results from last 7 days   Lab Units 08/24/23  0536   MAGNESIUM mg/dL 2.1         Counseling / Coordination of Care  Total floor / unit time spent today 25 minutes. Greater than 50% of total time was spent with the patient and / or family counseling and / or coordination of care.

## 2023-08-26 NOTE — PROGRESS NOTES
[address] [facility]  Progress Note  Name: Serenity Sloan  MRN: [id number]  Unit/Bed#: PPHP 522-01 I Date of Admission: 8/23/2023   Date of Service: 8/26/2023 I Hospital Day: 3    Assessment/Plan   History of gastric sleeve procedure  Assessment & Plan  History of gastric sleeve procedure noted    Major depressive disorder, recurrent, moderate (HCC)  Assessment & Plan  Continue Abilify    Type 2 diabetes mellitus with diabetic neuropathy, with long-term current use of insulin [facility])  Assessment & Plan  Lab Results   Component Value Date    HGBA1C 5.3 07/14/2022       Recent Labs     08/23/23  2316 08/24/23  0546 08/24/23  1049 08/25/23  1107   POCGLU 113 117 106 121       Blood Sugar Average: Last 72 hrs:  (P) 114.25   Not on  meds at home  Monitor    Opioid dependence, continuous (HCC)  Assessment & Plan  • Chronic pain syndrome  • Morphine administration thru intrathecal pain pump  • We will continue on oral Dilaudid and IV Dilaudid  •     GERD (gastroesophageal reflux disease)  Assessment & Plan  Continue PPI    CVA (cerebral vascular accident) [facility])  Assessment & Plan  History of CVA in 2005  Continue aspirin and statin    Morbid obesity ([address])  Assessment & Plan  With history of prior gastric sleeve    * Chest pain  Assessment & Plan  Patient presented with recurrent chest pain. Recent discharge from [facility] on 8/21/23  with Recent cardiac catheterization that did not reveal any critical lesions and medical management was recommended. Patient was evaluated by cardiology at [facility]  Per cardiology EKG with some anterior ST depressions on the initial ECG which resolved on subsequent EKGs. His troponins are mildly elevated and flat without significant change.     Cardiology recommended transfer to [facility] for cardiac MRI/ further management to assess for myopericarditis   Continue aspirin, statin, Plavix, beta-blocker, Ranexa and Imdur  Consult cardiology for further management    8/25/23: Patient is having significant nausea and vomiting -possibly from colchicine which was initiated yesterday we will hold, will also hold his morphine and continue with oral Dilaudid and IV Dilaudid for breakthrough pain  Change from ibuprofen to IV Toradol 15 mg tid for 24 hours  Continue on IV Protonix, continue on fluids  Appreciate cardiology recommendations      8/26/23: Patient's nausea and vomiting significantly improved yesterday throughout the day and today has resolved, we will continue to hold his colchicine  Continue current pain management in regards to opioid dosing, cardiology has adjusted from Toradol to ibuprofen now that patient is tolerating oral intake we will monitor closely if patient has worsening renal function again would recommend decreasing ibuprofen to 400 mg 3 times daily as this will is the most effective dose in regards to pain ceiling for ibuprofen. Will monitor off fluids today  Continue ppi                 VTE Pharmacologic Prophylaxis: VTE Score: 6 High Risk (Score >/= 5) - Pharmacological DVT Prophylaxis Ordered: enoxaparin (Lovenox). Sequential Compression Devices Ordered. Patient Centered Rounds: I performed bedside rounds with nursing staff today. Discussions with Specialists or Other Care Team Provider: cards    Education and Discussions with Family / Patient: Patient declined call to . Total Time Spent on Date of Encounter in care of patient: 35 minutes This time was spent on one or more of the following: performing physical exam; counseling and coordination of care; obtaining or reviewing history; documenting in the medical record; reviewing/ordering tests, medications or procedures; communicating with other healthcare professionals and discussing with patient's family/caregivers.     Current Length of Stay: 3 day(s)  Current Patient Status: Inpatient   Certification Statement: The patient will continue to require additional inpatient hospital stay due to Pending cardiac MRI  Discharge Plan: Anticipate discharge in 48 hrs to home. Code Status: Level 1 - Full Code    Subjective:   Patient denies any acute complaints during my evaluation denies any nausea vomiting is tolerating oral intake, denies nausea vomiting abdominal pain chest pain, although he has had intermittent episodes of chest pain over the past 24 hours they are consistently improved when he is sitting up and worsened when laying flat    Objective:     Vitals:   Temp (24hrs), Av.7 °F (37.1 °C), Min:98.3 °F (36.8 °C), Max:99.1 °F (37.3 °C)    Temp:  [98.3 °F (36.8 °C)-99.1 °F (37.3 °C)] 98.3 °F (36.8 °C)  HR:  [53-74] 64  Resp:  [18-25] 18  BP: (111-146)/(49-72) 116/59  SpO2:  [91 %-96 %] 91 %  Body mass index is 40.82 kg/m². Input and Output Summary (last 24 hours): Intake/Output Summary (Last 24 hours) at 2023 1504  Last data filed at 2023 1315  Gross per 24 hour   Intake 2067.25 ml   Output 0 ml   Net 2067.25 ml       Physical Exam:   Physical Exam  Vitals reviewed. Constitutional:       General: He is not in acute distress. Appearance: Normal appearance. He is obese. He is not ill-appearing or toxic-appearing. HENT:      Head: Normocephalic and atraumatic. Mouth/Throat:      Mouth: Mucous membranes are moist.      Pharynx: No oropharyngeal exudate. Eyes:      General: No scleral icterus. Extraocular Movements: Extraocular movements intact. Cardiovascular:      Rate and Rhythm: Normal rate and regular rhythm. Pulses: Normal pulses. Heart sounds: Normal heart sounds. No murmur heard. No gallop. Pulmonary:      Effort: Pulmonary effort is normal. No respiratory distress. Breath sounds: Normal breath sounds. No stridor. No wheezing, rhonchi or rales. Chest:      Chest wall: No tenderness. Abdominal:      General: Bowel sounds are normal. There is no distension.       Palpations: Abdomen is soft. Tenderness: There is no abdominal tenderness. There is no guarding or rebound. Hernia: No hernia is present. Comments: Left lower quadrant morphine pump in place   Musculoskeletal:         General: Normal range of motion. Cervical back: Normal range of motion and neck supple. Right lower leg: No edema. Left lower leg: No edema. Skin:     General: Skin is warm and dry. Neurological:      General: No focal deficit present. Mental Status: He is alert and oriented to person, place, and time. Cranial Nerves: No cranial nerve deficit. Psychiatric:         Mood and Affect: Mood normal.          Additional Data:     Labs:  Results from last 7 days   Lab Units 08/26/23  0452   WBC Thousand/uL 9.56   HEMOGLOBIN g/dL 12.8   HEMATOCRIT % 38.6   PLATELETS Thousands/uL 214   NEUTROS PCT % 70   LYMPHS PCT % 18   MONOS PCT % 8   EOS PCT % 3     Results from last 7 days   Lab Units 08/26/23  0452 08/24/23  0536 08/23/23  1152   SODIUM mmol/L 138   < > 138   POTASSIUM mmol/L 3.5   < > 4.4   CHLORIDE mmol/L 108   < > 110*   CO2 mmol/L 23   < > 20*   BUN mg/dL 20   < > 10   CREATININE mg/dL 1.07   < > 1.18   ANION GAP mmol/L 7   < > 8   CALCIUM mg/dL 7.7*   < > 8.6   ALBUMIN g/dL  --   --  3.8   TOTAL BILIRUBIN mg/dL  --   --  0.60   ALK PHOS U/L  --   --  63   ALT U/L  --   --  27   AST U/L  --   --  38   GLUCOSE RANDOM mg/dL 108   < > 109    < > = values in this interval not displayed. Results from last 7 days   Lab Units 08/25/23  1107 08/24/23  1049 08/24/23  0546 08/23/23  2316   POC GLUCOSE mg/dl 121 106 117 113               Lines/Drains:  Invasive Devices     Peripheral Intravenous Line  Duration           Peripheral IV 08/25/23 Dorsal (posterior); Right Forearm 1 day                  Telemetry:  Telemetry Orders (From admission, onward)             24 Hour Telemetry Monitoring  Continuous x 24 Hours (Telem)        Question:  Reason for 24 Hour Telemetry  Answer: Patients with talib/shae/endocarditis; cardiac contusion                              Imaging: No pertinent imaging reviewed. Recent Cultures (last 7 days):         Last 24 Hours Medication List:   Current Facility-Administered Medications   Medication Dose Route Frequency Provider Last Rate   • acetaminophen  975 mg Oral Q8H [facility] Samy Banai, DO     • amLODIPine  5 mg Oral Daily Trygve Lyn, DO     • ARIPiprazole  10 mg Oral Daily Trygve Lyn, DO     • aspirin  81 mg Oral Daily Trygve Lyn, DO     • atorvastatin  80 mg Oral After Nic Ball, DO     • baclofen  10 mg Oral TID Trygve Lyn, DO     • carvedilol  12.5 mg Oral BID With Meals Trygve Lyn, DO     • enoxaparin  40 mg Subcutaneous Daily Trygve Lyn, DO     • folic acid  1 mg Oral Daily Trygve Lyn, DO     • gabapentin  600 mg Oral TID Trygve Lyn, DO     • HYDROmorphone  1.2 mg Intravenous Q3H PRN Samy Banai, DO     • HYDROmorphone  6 mg Oral Q4H PRN Samy Banai, DO      Or   • HYDROmorphone  8 mg Oral Q4H PRN Samy Banai, DO     • ibuprofen  600 mg Oral [facility] Benjamin Andujar, DO     • insulin lispro  1-5 Units Subcutaneous TID AC Trygve Lyn, DO     • isosorbide mononitrate  60 mg Oral Daily Trygve Lyn, DO     • LORazepam  0.5 mg Oral Once PRN Jaime Chavira PA-C     • methocarbamol  750 mg Oral Q6H PRN Trygve Lyn, DO     • nitroglycerin  0.4 mg Sublingual Q5 Min PRN Trygve Lyn, DO     • ondansetron  4 mg Intravenous Q6H PRN Trygve Lyn, DO     • pantoprazole  40 mg Intravenous Q24H [facility] Samy Banai, DO     • ranolazine  1,000 mg Oral Q12H [facility] Trygve Lyn, DO     • tamsulosin  0.4 mg Oral Daily With Nic Ball, DO          Today, Patient Was Seen By: Demetris Rodriguez DO    **Please Note: This note may have been constructed using a voice recognition system. **

## 2023-08-26 NOTE — PLAN OF CARE
Problem: PAIN - ADULT  Goal: Verbalizes/displays adequate comfort level or baseline comfort level  Description: Interventions:  - Encourage patient to monitor pain and request assistance  - Assess pain using appropriate pain scale  - Administer analgesics based on type and severity of pain and evaluate response  - Implement non-pharmacological measures as appropriate and evaluate response  - Consider cultural and social influences on pain and pain management  - Notify physician/advanced practitioner if interventions unsuccessful or patient reports new pain  Outcome: Progressing     Problem: INFECTION - ADULT  Goal: Absence or prevention of progression during hospitalization  Description: INTERVENTIONS:  - Assess and monitor for signs and symptoms of infection  - Monitor lab/diagnostic results  - Monitor all insertion sites, i.e. indwelling lines, tubes, and drains  - Monitor endotracheal if appropriate and nasal secretions for changes in amount and color  - [place] appropriate cooling/warming therapies per order  - Administer medications as ordered  - Instruct and encourage patient and family to use good hand hygiene technique  - Identify and instruct in appropriate isolation precautions for identified infection/condition  Outcome: Progressing     Problem: SAFETY ADULT  Goal: Patient will remain free of falls  Description: INTERVENTIONS:  - Educate patient/family on patient safety including physical limitations  - Instruct patient to call for assistance with activity   - Consult OT/PT to assist with strengthening/mobility   - Keep Call bell within reach  - Keep bed low and locked with side rails adjusted as appropriate  - Keep care items and personal belongings within reach  - Initiate and maintain comfort rounds  - Make Fall Risk Sign visible to staff  - Offer Toileting every  Hours, in advance of need  - Initiate/Maintain alarm  - Obtain necessary fall risk management equipment:   - Apply yellow socks and bracelet for high fall risk patients  - Consider moving patient to room near nurses station  Outcome: Progressing  Goal: Maintain or return to baseline ADL function  Description: INTERVENTIONS:  -  Assess patient's ability to carry out ADLs; assess patient's baseline for ADL function and identify physical deficits which impact ability to perform ADLs (bathing, care of mouth/teeth, toileting, grooming, dressing, etc.)  - Assess/evaluate cause of self-care deficits   - Assess range of motion  - Assess patient's mobility; develop plan if impaired  - Assess patient's need for assistive devices and provide as appropriate  - Encourage maximum independence but intervene and supervise when necessary  - Involve family in performance of ADLs  - Assess for home care needs following discharge   - Consider OT consult to assist with ADL evaluation and planning for discharge  - Provide patient education as appropriate  Outcome: Progressing  Goal: Maintains/Returns to pre admission functional level  Description: INTERVENTIONS:  - Perform BMAT or MOVE assessment daily.   - Set and communicate daily mobility goal to care team and patient/family/caregiver. - Collaborate with rehabilitation services on mobility goals if consulted  - Perform Range of Motion  times a day. - Reposition patient every  hours.   - Dangle patient  times a day  - Stand patient  times a day  - Ambulate patient  times a day  - Out of bed to chair  times a day   - Out of bed for meals  times a day  - Out of bed for toileting  - Record patient progress and toleration of activity level   Outcome: Progressing     Problem: DISCHARGE PLANNING  Goal: Discharge to home or other facility with appropriate resources  Description: INTERVENTIONS:  - Identify barriers to discharge w/patient and caregiver  - Arrange for needed discharge resources and transportation as appropriate  - Identify discharge learning needs (meds, wound care, etc.)  - Arrange for interpretive services to assist at discharge as needed  - Refer to Case Management Department for coordinating discharge planning if the patient needs post-hospital services based on physician/advanced practitioner order or complex needs related to functional status, cognitive ability, or social support system  Outcome: Progressing     Problem: Knowledge Deficit  Goal: Patient/family/caregiver demonstrates understanding of disease process, treatment plan, medications, and discharge instructions  Description: Complete learning assessment and assess knowledge base.   Interventions:  - Provide teaching at level of understanding  - Provide teaching via preferred learning methods  Outcome: Progressing     Problem: CARDIOVASCULAR - ADULT  Goal: Maintains optimal cardiac output and hemodynamic stability  Description: INTERVENTIONS:  - Monitor I/O, vital signs and rhythm  - Monitor for S/S and trends of decreased cardiac output  - Administer and titrate ordered vasoactive medications to optimize hemodynamic stability  - Assess quality of pulses, skin color and temperature  - Assess for signs of decreased coronary artery perfusion  - Instruct patient to report change in severity of symptoms  Outcome: Progressing  Goal: Absence of cardiac dysrhythmias or at baseline rhythm  Description: INTERVENTIONS:  - Continuous cardiac monitoring, vital signs, obtain 12 lead EKG if ordered  - Administer antiarrhythmic and heart rate control medications as ordered  - Monitor electrolytes and administer replacement therapy as ordered  Outcome: Progressing     Problem: MOBILITY - ADULT  Goal: Maintain or return to baseline ADL function  Description: INTERVENTIONS:  -  Assess patient's ability to carry out ADLs; assess patient's baseline for ADL function and identify physical deficits which impact ability to perform ADLs (bathing, care of mouth/teeth, toileting, grooming, dressing, etc.)  - Assess/evaluate cause of self-care deficits   - Assess range of motion  - Assess patient's mobility; develop plan if impaired  - Assess patient's need for assistive devices and provide as appropriate  - Encourage maximum independence but intervene and supervise when necessary  - Involve family in performance of ADLs  - Assess for home care needs following discharge   - Consider OT consult to assist with ADL evaluation and planning for discharge  - Provide patient education as appropriate  Outcome: Progressing  Goal: Maintains/Returns to pre admission functional level  Description: INTERVENTIONS:  - Perform BMAT or MOVE assessment daily.   - Set and communicate daily mobility goal to care team and patient/family/caregiver. - Collaborate with rehabilitation services on mobility goals if consulted  - Perform Range of Motion  times a day. - Reposition patient every  hours.   - Dangle patient  times a day  - Stand patient  times a day  - Ambulate patient  times a day  - Out of bed to chair  times a day   - Out of bed for meals times a day  - Out of bed for toileting  - Record patient progress and toleration of activity level   Outcome: Progressing

## 2023-08-27 LAB
ANION GAP SERPL CALCULATED.3IONS-SCNC: 5 MMOL/L
ATRIAL RATE: 55 BPM
BASOPHILS # BLD AUTO: 0.03 THOUSANDS/ÂΜL (ref 0–0.1)
BASOPHILS NFR BLD AUTO: 0 % (ref 0–1)
BUN SERPL-MCNC: 15 MG/DL (ref 5–25)
CALCIUM SERPL-MCNC: 7.7 MG/DL (ref 8.4–10.2)
CHLORIDE SERPL-SCNC: 110 MMOL/L (ref 96–108)
CO2 SERPL-SCNC: 23 MMOL/L (ref 21–32)
CREAT SERPL-MCNC: 0.97 MG/DL (ref 0.6–1.3)
EOSINOPHIL # BLD AUTO: 0.27 THOUSAND/ÂΜL (ref 0–0.61)
EOSINOPHIL NFR BLD AUTO: 4 % (ref 0–6)
ERYTHROCYTE [DISTWIDTH] IN BLOOD BY AUTOMATED COUNT: 13.3 % (ref 11.6–15.1)
GFR SERPL CREATININE-BSD FRML MDRD: 84 ML/MIN/1.73SQ M
GLUCOSE SERPL-MCNC: 133 MG/DL (ref 65–140)
HCT VFR BLD AUTO: 39.7 % (ref 36.5–49.3)
HGB BLD-MCNC: 12.1 G/DL (ref 12–17)
IMM GRANULOCYTES # BLD AUTO: 0.05 THOUSAND/UL (ref 0–0.2)
IMM GRANULOCYTES NFR BLD AUTO: 1 % (ref 0–2)
LYMPHOCYTES # BLD AUTO: 1.78 THOUSANDS/ÂΜL (ref 0.6–4.47)
LYMPHOCYTES NFR BLD AUTO: 24 % (ref 14–44)
MCH RBC QN AUTO: 29.5 PG (ref 26.8–34.3)
MCHC RBC AUTO-ENTMCNC: 30.5 G/DL (ref 31.4–37.4)
MCV RBC AUTO: 97 FL (ref 82–98)
MONOCYTES # BLD AUTO: 0.58 THOUSAND/ÂΜL (ref 0.17–1.22)
MONOCYTES NFR BLD AUTO: 8 % (ref 4–12)
NEUTROPHILS # BLD AUTO: 4.84 THOUSANDS/ÂΜL (ref 1.85–7.62)
NEUTS SEG NFR BLD AUTO: 63 % (ref 43–75)
NRBC BLD AUTO-RTO: 0 /100 WBCS
P AXIS: 41 DEGREES
PLATELET # BLD AUTO: 201 THOUSANDS/UL (ref 149–390)
PMV BLD AUTO: 10.5 FL (ref 8.9–12.7)
POTASSIUM SERPL-SCNC: 3.6 MMOL/L (ref 3.5–5.3)
PR INTERVAL: 214 MS
QRS AXIS: -61 DEGREES
QRSD INTERVAL: 144 MS
QT INTERVAL: 494 MS
QTC INTERVAL: 472 MS
RBC # BLD AUTO: 4.1 MILLION/UL (ref 3.88–5.62)
SODIUM SERPL-SCNC: 138 MMOL/L (ref 135–147)
T WAVE AXIS: 3 DEGREES
VENTRICULAR RATE: 55 BPM
WBC # BLD AUTO: 7.55 THOUSAND/UL (ref 4.31–10.16)

## 2023-08-27 PROCEDURE — 99232 SBSQ HOSP IP/OBS MODERATE 35: CPT | Performed by: INTERNAL MEDICINE

## 2023-08-27 PROCEDURE — 85025 COMPLETE CBC W/AUTO DIFF WBC: CPT | Performed by: INTERNAL MEDICINE

## 2023-08-27 PROCEDURE — 93010 ELECTROCARDIOGRAM REPORT: CPT | Performed by: INTERNAL MEDICINE

## 2023-08-27 PROCEDURE — 80048 BASIC METABOLIC PNL TOTAL CA: CPT | Performed by: INTERNAL MEDICINE

## 2023-08-27 PROCEDURE — 92610 EVALUATE SWALLOWING FUNCTION: CPT

## 2023-08-27 PROCEDURE — C9113 INJ PANTOPRAZOLE SODIUM, VIA: HCPCS | Performed by: INTERNAL MEDICINE

## 2023-08-27 RX ORDER — COLCHICINE 0.6 MG/1
0.6 TABLET ORAL 2 TIMES DAILY
Status: DISCONTINUED | OUTPATIENT
Start: 2023-08-27 | End: 2023-08-30

## 2023-08-27 RX ADMIN — ACETAMINOPHEN 975 MG: 325 TABLET, FILM COATED ORAL at 05:08

## 2023-08-27 RX ADMIN — IBUPROFEN 600 MG: 600 TABLET ORAL at 20:52

## 2023-08-27 RX ADMIN — TAMSULOSIN HYDROCHLORIDE 0.4 MG: 0.4 CAPSULE ORAL at 16:26

## 2023-08-27 RX ADMIN — ACETAMINOPHEN 975 MG: 325 TABLET, FILM COATED ORAL at 20:52

## 2023-08-27 RX ADMIN — ACETAMINOPHEN 975 MG: 325 TABLET, FILM COATED ORAL at 13:35

## 2023-08-27 RX ADMIN — BACLOFEN 10 MG: 10 TABLET ORAL at 08:27

## 2023-08-27 RX ADMIN — GABAPENTIN 600 MG: 300 CAPSULE ORAL at 16:26

## 2023-08-27 RX ADMIN — GABAPENTIN 600 MG: 300 CAPSULE ORAL at 20:49

## 2023-08-27 RX ADMIN — AMLODIPINE BESYLATE 5 MG: 5 TABLET ORAL at 08:27

## 2023-08-27 RX ADMIN — HYDROMORPHONE HYDROCHLORIDE 8 MG: 2 TABLET ORAL at 20:49

## 2023-08-27 RX ADMIN — ENOXAPARIN SODIUM 40 MG: 40 INJECTION SUBCUTANEOUS at 08:28

## 2023-08-27 RX ADMIN — RANOLAZINE 1000 MG: 500 TABLET, FILM COATED, EXTENDED RELEASE ORAL at 20:49

## 2023-08-27 RX ADMIN — CARVEDILOL 12.5 MG: 12.5 TABLET, FILM COATED ORAL at 16:26

## 2023-08-27 RX ADMIN — IBUPROFEN 600 MG: 600 TABLET ORAL at 13:35

## 2023-08-27 RX ADMIN — BACLOFEN 10 MG: 10 TABLET ORAL at 20:49

## 2023-08-27 RX ADMIN — HYDROMORPHONE HYDROCHLORIDE 8 MG: 2 TABLET ORAL at 11:10

## 2023-08-27 RX ADMIN — BACLOFEN 10 MG: 10 TABLET ORAL at 16:26

## 2023-08-27 RX ADMIN — RANOLAZINE 1000 MG: 500 TABLET, FILM COATED, EXTENDED RELEASE ORAL at 08:27

## 2023-08-27 RX ADMIN — CARVEDILOL 12.5 MG: 12.5 TABLET, FILM COATED ORAL at 08:27

## 2023-08-27 RX ADMIN — HYDROMORPHONE HYDROCHLORIDE 8 MG: 2 TABLET ORAL at 16:26

## 2023-08-27 RX ADMIN — PANTOPRAZOLE SODIUM 40 MG: 40 INJECTION, POWDER, FOR SOLUTION INTRAVENOUS at 08:28

## 2023-08-27 RX ADMIN — HYDROMORPHONE HYDROCHLORIDE 8 MG: 2 TABLET ORAL at 05:08

## 2023-08-27 RX ADMIN — ASPIRIN 81 MG CHEWABLE TABLET 81 MG: 81 TABLET CHEWABLE at 08:27

## 2023-08-27 RX ADMIN — FOLIC ACID 1 MG: 1 TABLET ORAL at 08:27

## 2023-08-27 RX ADMIN — GABAPENTIN 600 MG: 300 CAPSULE ORAL at 08:27

## 2023-08-27 RX ADMIN — IBUPROFEN 600 MG: 600 TABLET ORAL at 05:08

## 2023-08-27 RX ADMIN — ISOSORBIDE MONONITRATE 60 MG: 60 TABLET, EXTENDED RELEASE ORAL at 08:27

## 2023-08-27 RX ADMIN — COLCHICINE 0.6 MG: 0.6 TABLET ORAL at 08:27

## 2023-08-27 NOTE — ASSESSMENT & PLAN NOTE
Patient presented with recurrent chest pain. Recent discharge from [address] [place] [address] on 8/21/23  with Recent cardiac catheterization that did not reveal any critical lesions and medical management was recommended. Patient was evaluated by cardiology at [facility]  Per cardiology EKG with some anterior ST depressions on the initial ECG which resolved on subsequent EKGs. His troponins are mildly elevated and flat without significant change.     Cardiology recommended transfer to [address] [place] [address] for cardiac MRI/ further management to assess for myopericarditis   Continue aspirin, statin, Plavix, beta-blocker, Ranexa and Imdur  Consult cardiology for further management    8/25/23: Patient is having significant nausea and vomiting -possibly from colchicine which was initiated yesterday we will hold, will also hold his morphine and continue with oral Dilaudid and IV Dilaudid for breakthrough pain  Change from ibuprofen to IV Toradol 15 mg tid for 24 hours  Continue on IV Protonix, continue on fluids  Appreciate cardiology recommendations      8/26/23: Patient's nausea and vomiting significantly improved yesterday throughout the day and today has resolved, we will continue to hold his colchicine  Continue current pain management in regards to opioid dosing, cardiology has adjusted from Toradol to ibuprofen now that patient is tolerating oral intake we will monitor closely   Will monitor off fluids today  Continue ppi    8/27/23: Patient continues to improve clinically no nausea and vomiting able to tolerate oral medications we will continue on ibuprofen  Pain is well controlled, patient feels more comfortable laying flat than prior  Cardiac MRI pending  Continue PPI

## 2023-08-27 NOTE — ASSESSMENT & PLAN NOTE
Lab Results   Component Value Date    HGBA1C 5.3 07/14/2022       Recent Labs     08/25/23  1107   POCGLU 121       Blood Sugar Average: Last 72 hrs:  (P) [id number]   Not on  meds at home  Monitor

## 2023-08-27 NOTE — PROGRESS NOTES
[address] [facility]  Progress Note  Name: Ligia Wilson  MRN: [id number]  Unit/Bed#: [facility] 522-01 I Date of Admission: 8/23/2023   Date of Service: 8/27/2023 I Hospital Day: 4    Assessment/Plan   History of gastric sleeve procedure  Assessment & Plan  History of gastric sleeve procedure noted    Major depressive disorder, recurrent, moderate ([address])  Assessment & Plan  Continue Abilify    Type 2 diabetes mellitus with diabetic neuropathy, with long-term current use of insulin [facility])  Assessment & Plan  Lab Results   Component Value Date    HGBA1C 5.3 07/14/2022       Recent Labs     08/25/23  1107   POCGLU 121       Blood Sugar Average: Last 72 hrs:  (P) [id number]   Not on  meds at home  Monitor    Opioid dependence, continuous (HCC)  Assessment & Plan  • Chronic pain syndrome  • Morphine administration thru intrathecal pain pump  • We will continue on oral Dilaudid and IV Dilaudid  •     GERD (gastroesophageal reflux disease)  Assessment & Plan  Continue PPI    CVA (cerebral vascular accident) [facility])  Assessment & Plan  History of CVA in 2005  Continue aspirin and statin    Morbid obesity ([address])  Assessment & Plan  With history of prior gastric sleeve    * Chest pain  Assessment & Plan  Patient presented with recurrent chest pain. Recent discharge from [facility] on 8/21/23  with Recent cardiac catheterization that did not reveal any critical lesions and medical management was recommended. Patient was evaluated by cardiology at [facility]  Per cardiology EKG with some anterior ST depressions on the initial ECG which resolved on subsequent EKGs. His troponins are mildly elevated and flat without significant change.     Cardiology recommended transfer to [facility] for cardiac MRI/ further management to assess for myopericarditis   Continue aspirin, statin, Plavix, beta-blocker, Ranexa and Imdur  Consult cardiology for further management    8/25/23: Patient is having significant nausea and vomiting -possibly from colchicine which was initiated yesterday we will hold, will also hold his morphine and continue with oral Dilaudid and IV Dilaudid for breakthrough pain  Change from ibuprofen to IV Toradol 15 mg tid for 24 hours  Continue on IV Protonix, continue on fluids  Appreciate cardiology recommendations      8/26/23: Patient's nausea and vomiting significantly improved yesterday throughout the day and today has resolved, we will continue to hold his colchicine  Continue current pain management in regards to opioid dosing, cardiology has adjusted from Toradol to ibuprofen now that patient is tolerating oral intake we will monitor closely   Will monitor off fluids today  Continue ppi    8/27/23: Patient continues to improve clinically no nausea and vomiting able to tolerate oral medications we will continue on ibuprofen  Pain is well controlled, patient feels more comfortable laying flat than prior  Cardiac MRI pending  Continue PPI               VTE Pharmacologic Prophylaxis: VTE Score: 6 High Risk (Score >/= 5) - Pharmacological DVT Prophylaxis Ordered: enoxaparin (Lovenox). Sequential Compression Devices Ordered. Patient Centered Rounds: I performed bedside rounds with nursing staff today. Discussions with Specialists or Other Care Team Provider: cards    Education and Discussions with Family / Patient: Patient declined call to . Total Time Spent on Date of Encounter in care of patient: 35 minutes This time was spent on one or more of the following: performing physical exam; counseling and coordination of care; obtaining or reviewing history; documenting in the medical record; reviewing/ordering tests, medications or procedures; communicating with other healthcare professionals and discussing with patient's family/caregivers.     Current Length of Stay: 4 day(s)  Current Patient Status: Inpatient   Certification Statement: The patient will continue to require additional inpatient hospital stay due to pending cardiac mri  Discharge Plan: Anticipate discharge in 24-48 hrs to home with home services. Code Status: Level 1 - Full Code    Subjective:   Patient denies any acute complaints today reports pain is well controlled today    Objective:     Vitals:   Temp (24hrs), Av.5 °F (36.9 °C), Min:98.1 °F (36.7 °C), Max:98.9 °F (37.2 °C)    Temp:  [98.1 °F (36.7 °C)-98.9 °F (37.2 °C)] 98.1 °F (36.7 °C)  HR:  [58-70] 59  Resp:  [17-20] 20  BP: (120-138)/(59-94) 132/59  SpO2:  [92 %-96 %] 95 %  Body mass index is 40.82 kg/m². Input and Output Summary (last 24 hours): Intake/Output Summary (Last 24 hours) at 2023 1451  Last data filed at 2023 1200  Gross per 24 hour   Intake 658 ml   Output 0 ml   Net 658 ml       Physical Exam:   Physical Exam  Vitals reviewed. Constitutional:       General: He is not in acute distress. Appearance: Normal appearance. He is obese. He is not ill-appearing or toxic-appearing. HENT:      Head: Normocephalic and atraumatic. Mouth/Throat:      Mouth: Mucous membranes are moist.      Pharynx: No oropharyngeal exudate. Eyes:      General: No scleral icterus. Extraocular Movements: Extraocular movements intact. Cardiovascular:      Rate and Rhythm: Normal rate and regular rhythm. Pulses: Normal pulses. Heart sounds: Normal heart sounds. No murmur heard. No gallop. Pulmonary:      Effort: Pulmonary effort is normal. No respiratory distress. Breath sounds: Normal breath sounds. No stridor. No wheezing, rhonchi or rales. Chest:      Chest wall: No tenderness. Abdominal:      General: Bowel sounds are normal. There is no distension. Palpations: Abdomen is soft. Tenderness: There is no abdominal tenderness. There is no guarding or rebound. Hernia: No hernia is present.       Comments: Left lower quadrant morphine pump in place   Musculoskeletal: General: Normal range of motion. Cervical back: Normal range of motion and neck supple. Right lower leg: No edema. Left lower leg: No edema. Skin:     General: Skin is warm and dry. Neurological:      General: No focal deficit present. Mental Status: He is alert and oriented to person, place, and time. Cranial Nerves: No cranial nerve deficit. Psychiatric:         Mood and Affect: Mood normal.          Additional Data:     Labs:  Results from last 7 days   Lab Units 08/27/23  0505   WBC Thousand/uL 7.55   HEMOGLOBIN g/dL 12.1   HEMATOCRIT % 39.7   PLATELETS Thousands/uL 201   NEUTROS PCT % 63   LYMPHS PCT % 24   MONOS PCT % 8   EOS PCT % 4     Results from last 7 days   Lab Units 08/27/23  0505 08/24/23  0536 08/23/23  1152   SODIUM mmol/L 138   < > 138   POTASSIUM mmol/L 3.6   < > 4.4   CHLORIDE mmol/L 110*   < > 110*   CO2 mmol/L 23   < > 20*   BUN mg/dL 15   < > 10   CREATININE mg/dL 0.97   < > 1.18   ANION GAP mmol/L 5   < > 8   CALCIUM mg/dL 7.7*   < > 8.6   ALBUMIN g/dL  --   --  3.8   TOTAL BILIRUBIN mg/dL  --   --  0.60   ALK PHOS U/L  --   --  63   ALT U/L  --   --  27   AST U/L  --   --  38   GLUCOSE RANDOM mg/dL 133   < > 109    < > = values in this interval not displayed. Results from last 7 days   Lab Units 08/25/23  1107 08/24/23  1049 08/24/23  0546 08/23/23  2316   POC GLUCOSE mg/dl 121 106 117 113               Lines/Drains:  Invasive Devices     Peripheral Intravenous Line  Duration           Peripheral IV 08/25/23 Dorsal (posterior); Right Forearm 2 days                  Telemetry:  Telemetry Orders (From admission, onward)             24 Hour Telemetry Monitoring  Continuous x 24 Hours (Telem)        Question:  Reason for 24 Hour Telemetry  Answer:  Patients with talib/shae/endocarditis; cardiac contusion                              Imaging: No pertinent imaging reviewed.     Recent Cultures (last 7 days):         Last 24 Hours Medication List:   Current Facility-Administered Medications   Medication Dose Route Frequency Provider Last Rate   • acetaminophen  975 mg Oral Q8H [facility] Samy Banai, DO     • amLODIPine  5 mg Oral Daily Pk Drop, DO     • ARIPiprazole  10 mg Oral Daily Pk Drop, DO     • aspirin  81 mg Oral Daily Pk Drop, DO     • atorvastatin  80 mg Oral After [place] Spry, DO     • baclofen  10 mg Oral TID Pk Drop, DO     • bisacodyl  5 mg Oral Daily PRN Marylou Parry PA-C     • carvedilol  12.5 mg Oral BID With Meals Pk Drop, DO     • colchicine  0.6 mg Oral BID Elvira Enoc, DO     • enoxaparin  40 mg Subcutaneous Daily Pk Drop, DO     • folic acid  1 mg Oral Daily Pk Drop, DO     • gabapentin  600 mg Oral TID Pk Drop, DO     • HYDROmorphone  1.2 mg Intravenous Q3H PRN Samy Banai, DO     • HYDROmorphone  6 mg Oral Q4H PRN Samy Banai, DO      Or   • HYDROmorphone  8 mg Oral Q4H PRN Samy Banai, DO     • ibuprofen  600 mg Oral Q8H [address], DO     • insulin lispro  1-5 Units Subcutaneous TID AC Pk Drop, DO     • isosorbide mononitrate  60 mg Oral Daily Pk Drop, DO     • LORazepam  0.5 mg Oral Once PRN Marylou Parry PA-C     • methocarbamol  750 mg Oral Q6H PRN Pk Drop, DO     • nitroglycerin  0.4 mg Sublingual Q5 Min PRN Pk Drop, DO     • ondansetron  4 mg Intravenous Q6H PRN Pk Drop, DO     • pantoprazole  40 mg Intravenous Q24H [facility] Samy Banai, DO     • ranolazine  1,000 mg Oral Q12H [facility] Pk Drop, DO     • tamsulosin  0.4 mg Oral Daily With Andry Spry, DO          Today, Patient Was Seen By: Rupal Rice DO    **Please Note: This note may have been constructed using a voice recognition system. **

## 2023-08-27 NOTE — PLAN OF CARE
Problem: PAIN - ADULT  Goal: Verbalizes/displays adequate comfort level or baseline comfort level  Description: Interventions:  - Encourage patient to monitor pain and request assistance  - Assess pain using appropriate pain scale  - Administer analgesics based on type and severity of pain and evaluate response  - Implement non-pharmacological measures as appropriate and evaluate response  - Consider cultural and social influences on pain and pain management  - Notify physician/advanced practitioner if interventions unsuccessful or patient reports new pain  Outcome: Progressing     Problem: INFECTION - ADULT  Goal: Absence or prevention of progression during hospitalization  Description: INTERVENTIONS:  - Assess and monitor for signs and symptoms of infection  - Monitor lab/diagnostic results  - Monitor all insertion sites, i.e. indwelling lines, tubes, and drains  - Monitor endotracheal if appropriate and nasal secretions for changes in amount and color  - [place] appropriate cooling/warming therapies per order  - Administer medications as ordered  - Instruct and encourage patient and family to use good hand hygiene technique  - Identify and instruct in appropriate isolation precautions for identified infection/condition  Outcome: Progressing     Problem: SAFETY ADULT  Goal: Patient will remain free of falls  Description: INTERVENTIONS:  - Educate patient/family on patient safety including physical limitations  - Instruct patient to call for assistance with activity   - Consult OT/PT to assist with strengthening/mobility   - Keep Call bell within reach  - Keep bed low and locked with side rails adjusted as appropriate  - Keep care items and personal belongings within reach  - Initiate and maintain comfort rounds  - Make Fall Risk Sign visible to staff  - Offer Toileting every  Hours, in advance of need  - Initiate/Maintain alarm  - Obtain necessary fall risk management equipment:   - Apply yellow socks and bracelet for high fall risk patients  - Consider moving patient to room near nurses station  Outcome: Progressing  Goal: Maintain or return to baseline ADL function  Description: INTERVENTIONS:  -  Assess patient's ability to carry out ADLs; assess patient's baseline for ADL function and identify physical deficits which impact ability to perform ADLs (bathing, care of mouth/teeth, toileting, grooming, dressing, etc.)  - Assess/evaluate cause of self-care deficits   - Assess range of motion  - Assess patient's mobility; develop plan if impaired  - Assess patient's need for assistive devices and provide as appropriate  - Encourage maximum independence but intervene and supervise when necessary  - Involve family in performance of ADLs  - Assess for home care needs following discharge   - Consider OT consult to assist with ADL evaluation and planning for discharge  - Provide patient education as appropriate  Outcome: Progressing  Goal: Maintains/Returns to pre admission functional level  Description: INTERVENTIONS:  - Perform BMAT or MOVE assessment daily.   - Set and communicate daily mobility goal to care team and patient/family/caregiver. - Collaborate with rehabilitation services on mobility goals if consulted  - Perform Range of Motion  times a day. - Reposition patient every  hours.   - Dangle patient  times a day  - Stand patient  times a day  - Ambulate patient  times a day  - Out of bed to chair  times a day   - Out of bed for meals  times a day  - Out of bed for toileting  - Record patient progress and toleration of activity level   Outcome: Progressing     Problem: DISCHARGE PLANNING  Goal: Discharge to home or other facility with appropriate resources  Description: INTERVENTIONS:  - Identify barriers to discharge w/patient and caregiver  - Arrange for needed discharge resources and transportation as appropriate  - Identify discharge learning needs (meds, wound care, etc.)  - Arrange for interpretive services to assist at discharge as needed  - Refer to Case Management Department for coordinating discharge planning if the patient needs post-hospital services based on physician/advanced practitioner order or complex needs related to functional status, cognitive ability, or social support system  Outcome: Progressing     Problem: Knowledge Deficit  Goal: Patient/family/caregiver demonstrates understanding of disease process, treatment plan, medications, and discharge instructions  Description: Complete learning assessment and assess knowledge base.   Interventions:  - Provide teaching at level of understanding  - Provide teaching via preferred learning methods  Outcome: Progressing     Problem: CARDIOVASCULAR - ADULT  Goal: Maintains optimal cardiac output and hemodynamic stability  Description: INTERVENTIONS:  - Monitor I/O, vital signs and rhythm  - Monitor for S/S and trends of decreased cardiac output  - Administer and titrate ordered vasoactive medications to optimize hemodynamic stability  - Assess quality of pulses, skin color and temperature  - Assess for signs of decreased coronary artery perfusion  - Instruct patient to report change in severity of symptoms  Outcome: Progressing  Goal: Absence of cardiac dysrhythmias or at baseline rhythm  Description: INTERVENTIONS:  - Continuous cardiac monitoring, vital signs, obtain 12 lead EKG if ordered  - Administer antiarrhythmic and heart rate control medications as ordered  - Monitor electrolytes and administer replacement therapy as ordered  Outcome: Progressing     Problem: MOBILITY - ADULT  Goal: Maintain or return to baseline ADL function  Description: INTERVENTIONS:  -  Assess patient's ability to carry out ADLs; assess patient's baseline for ADL function and identify physical deficits which impact ability to perform ADLs (bathing, care of mouth/teeth, toileting, grooming, dressing, etc.)  - Assess/evaluate cause of self-care deficits   - Assess range of motion  - Assess patient's mobility; develop plan if impaired  - Assess patient's need for assistive devices and provide as appropriate  - Encourage maximum independence but intervene and supervise when necessary  - Involve family in performance of ADLs  - Assess for home care needs following discharge   - Consider OT consult to assist with ADL evaluation and planning for discharge  - Provide patient education as appropriate  Outcome: Progressing  Goal: Maintains/Returns to pre admission functional level  Description: INTERVENTIONS:  - Perform BMAT or MOVE assessment daily.   - Set and communicate daily mobility goal to care team and patient/family/caregiver. - Collaborate with rehabilitation services on mobility goals if consulted  - Perform Range of Motion  times a day. - Reposition patient every  hours.   - Dangle patient  times a day  - Stand patient  times a day  - Ambulate patient  times a day  - Out of bed to chair  times a day   - Out of bed for meals times a day  - Out of bed for toileting  - Record patient progress and toleration of activity level   Outcome: Progressing

## 2023-08-27 NOTE — PROGRESS NOTES
Cardiology Progress Note - Arjun Corcoran 63 y.o. male MRN: [id number]    Unit/Bed#: [facility] 522-01 Encounter: [id number]      Assessment:  1. Chest pain consistent with myopericarditis   2. CAD s/p PCI/stents to LAD, LCX, RCA  3. Preserved LV systolic function   4. Benign essential hypertension  5. Dyslipidemia   6. DM type 2  7. Morbid obesity - BMI 41    Plan:  1. Chest pain better controlled today, still present but able to lie flat and take deep breaths  2. Ibuprofen 600 mg TID, resume colchicine   3. IV PPI added, history of prior gastric bypass  4. Cardiac MRI pending  5. Echo with no effusion, inflammatory biomarkers noted  6. Discontinue Plavix while on ibuprofen  7. Continue beta-blocker and statin therapy when able to tolerate oral medication   8. Continue to monitor renal function while on high-dose NSAIDs     Subjective:   Patient seen and examined. Pain still present but improved. Able to lie flat. Able to take deep breaths. Objective:     Vitals: Blood pressure 131/76, pulse 60, temperature 98.3 °F (36.8 °C), resp. rate 20, height 6' (1.829 m), weight (!) 137 kg (301 lb), SpO2 95 %. , Body mass index is 40.82 kg/m².,   Orthostatic Blood Pressures    Flowsheet Row Most Recent Value   Blood Pressure 131/76 filed at 08/26/2023 2200   Patient Position - Orthostatic VS Lying filed at 08/26/2023 1604            Intake/Output Summary (Last 24 hours) at 8/27/2023 6246  Last data filed at 8/26/2023 2000  Gross per 24 hour   Intake 338 ml   Output 0 ml   Net 338 ml       Physical Exam:    GEN: Arjun Corcoran appears well, alert and oriented x 3, pleasant and cooperative   HEENT: pupils equal, round, and reactive to light; extraocular muscles intact  NECK: supple, no carotid bruits   HEART: regular rhythm, normal S1 and S2, no murmur  LUNGS: clear to auscultation bilaterally; no wheezes, rales, or rhonchi   ABDOMEN: normal bowel sounds, soft, no tenderness, no distention  EXTREMITIES: peripheral pulses normal; no clubbing, cyanosis, or edema  NEURO: no focal findings   SKIN: normal without suspicious lesions on exposed skin    Medications:      Current Facility-Administered Medications:   •  acetaminophen (TYLENOL) tablet 975 mg, 975 mg, Oral, Q8H [address], Samy Banai, DO, 975 mg at 08/27/23 0508  •  amLODIPine (NORVASC) tablet 5 mg, 5 mg, Oral, Daily, Trygve Lyn, DO, 5 mg at 08/26/23 0901  •  ARIPiprazole (ABILIFY) tablet 10 mg, 10 mg, Oral, Daily, Trygve Lyn, DO  •  aspirin chewable tablet 81 mg, 81 mg, Oral, Daily, Trygve Lyn, DO, 81 mg at 08/26/23 0900  •  atorvastatin (LIPITOR) tablet 80 mg, 80 mg, Oral, After Nic Ball, DO, 80 mg at 08/26/23 1722  •  baclofen tablet 10 mg, 10 mg, Oral, TID, Trygve Lyn, DO, 10 mg at 08/26/23 2136  •  bisacodyl (DULCOLAX) EC tablet 5 mg, 5 mg, Oral, Daily PRN, Jaime Chavira, PA-C, 5 mg at 08/26/23 2136  •  carvedilol (COREG) tablet 12.5 mg, 12.5 mg, Oral, BID With Meals, Trygve Lyn, DO, 12.5 mg at 08/26/23 1722  •  enoxaparin (LOVENOX) subcutaneous injection 40 mg, 40 mg, Subcutaneous, Daily, Trygve Lyn, DO, 40 mg at 74/31/01 2870  •  folic acid (FOLVITE) tablet 1 mg, 1 mg, Oral, Daily, Trygve Lyn, DO, 1 mg at 08/26/23 0900  •  gabapentin (NEURONTIN) capsule 600 mg, 600 mg, Oral, TID, Trygve Lyn, DO, 600 mg at 08/26/23 2136  •  HYDROmorphone (DILAUDID) injection 1.2 mg, 1.2 mg, Intravenous, Q3H PRN, Samy Banai, DO  •  HYDROmorphone (DILAUDID) tablet 6 mg, 6 mg, Oral, Q4H PRN **OR** HYDROmorphone (DILAUDID) tablet 8 mg, 8 mg, Oral, Q4H PRN, Samy Myles, DO, 8 mg at 08/27/23 0508  •  ibuprofen (MOTRIN) tablet 600 mg, 600 mg, Oral, Q8H [address], [facility] Andujar, DO, 600 mg at 08/27/23 7917  •  insulin lispro (HumaLOG) 100 units/mL subcutaneous injection 1-5 Units, 1-5 Units, Subcutaneous, TID AC **AND** Fingerstick Glucose (POCT), , , TID AC, Trygnazia Nicholson, DO  •  isosorbide mononitrate (IMDUR) 24 hr tablet 60 mg, 60 mg, Oral, Daily, Marjorie Clore, DO, 60 mg at 08/26/23 0900  •  LORazepam (ATIVAN) tablet 0.5 mg, 0.5 mg, Oral, Once PRN, Brii Stoddard PA-C  •  methocarbamol (ROBAXIN) tablet 750 mg, 750 mg, Oral, Q6H PRN, Marjorie Clore, DO  •  nitroglycerin (NITROSTAT) SL tablet 0.4 mg, 0.4 mg, Sublingual, Q5 Min PRN, Marjorie Clore, DO, 0.4 mg at 08/24/23 1159  •  ondansetron (ZOFRAN) injection 4 mg, 4 mg, Intravenous, Q6H PRN, Marjorie Clore, DO, 4 mg at 08/25/23 0757  •  pantoprazole (PROTONIX) injection 40 mg, 40 mg, Intravenous, Q24H [facility], Samy Banai, DO, 40 mg at 08/26/23 0901  •  ranolazine (RANEXA) 12 hr tablet 1,000 mg, 1,000 mg, Oral, Q12H [facility], Marjorie Clore, DO, 1,000 mg at 08/26/23 2135  •  tamsulosin (FLOMAX) capsule 0.4 mg, 0.4 mg, Oral, Daily With Geovanni Reining, DO, 0.4 mg at 08/26/23 1722     Labs & Results:        Results from last 7 days   Lab Units 08/27/23  0505 08/26/23  0452 08/25/23  0503   WBC Thousand/uL 7.55 9.56 20.62*   HEMOGLOBIN g/dL 12.1 12.8 16.2   HEMATOCRIT % 39.7 38.6 49.3   PLATELETS Thousands/uL 201 214 270         Results from last 7 days   Lab Units 08/27/23  0505 08/26/23  0452 08/25/23  0503 08/24/23  0536 08/23/23  1152   POTASSIUM mmol/L 3.6 3.5 4.3   < > 4.4   CHLORIDE mmol/L 110* 108 107   < > 110*   CO2 mmol/L 23 23 21   < > 20*   BUN mg/dL 15 20 23   < > 10   CREATININE mg/dL 0.97 1.07 1.36*   < > 1.18   CALCIUM mg/dL 7.7* 7.7* 9.3   < > 8.6   ALK PHOS U/L  --   --   --   --  63   ALT U/L  --   --   --   --  27   AST U/L  --   --   --   --  38    < > = values in this interval not displayed. Results from last 7 days   Lab Units 08/21/23  0514 08/20/23 2014 08/20/23  1240   PTT seconds 74* 54* 51*     Results from last 7 days   Lab Units 08/24/23  0536   MAGNESIUM mg/dL 2.1         Counseling / Coordination of Care  Total floor / unit time spent today 25 minutes.   Greater than 50% of total time was spent with the patient and / or family counseling and / or coordination of care.

## 2023-08-27 NOTE — SPEECH THERAPY NOTE
Speech-Language Pathology Bedside Swallow Evaluation      Patient Name: Jerrell Anderson    [id number] Date: 8/27/2023     Problem List  Principal Problem:    Chest pain  Active Problems:     Morbid obesity ([address])    CVA (cerebral vascular accident) ([address])    GERD (gastroesophageal reflux disease)    Opioid dependence, continuous ([address])    Type 2 diabetes mellitus with diabetic neuropathy, with long-term current use of insulin ([facility])    Major depressive disorder, recurrent, moderate ([facility])    History of gastric sleeve procedure      Past Medical History  Past Medical History:   Diagnosis Date   • Acute on chronic diastolic congestive heart failure ([facility])    • Altered gait    • Alzheimer disease ([address])     per patients,,early onset    • Angina pectoris ([facility])    • Anxiety    • Arthritis    • Brain aneurysm     coils placed   • Cardiac disease    • Chest pain 1/13/2016   • Chronic kidney disease    • Chronic pain     back/ right groin and rle- has morphine pump   • Constipation    • COPD (chronic obstructive pulmonary disease) ([facility])    • Coronary artery disease    • CPAP (continuous positive airway pressure) dependence    • Decubital ulcer     sacral decub-occured 5/2019-sees wound care/debide in OR today 6/6/2019   • Dependent on walker for ambulation     w/c for long distance   • Depression    • Diabetes mellitus ([facility])     insulin dependent   • Dizziness     occ   • Dysphagia    • Enlarged prostate    • Fall    • GERD (gastroesophageal reflux disease)    • Heart failure ([address])    • Hiatal hernia    • Hx of gastric bypass 11/19/2018   • Hypercholesterolemia    • Hypertension    • MI (myocardial infarction) ([address])     2017- stents x2   • Migraine    • Morbid obesity ([facility])     gastric bypass sleeve 11/2018-wt loss 125 lb   • Neuropathy    • Oxygen dependent     Q HS  2LPM with CPAP and prn during day 2-3 LPM    • Pressure injury of skin    • Renal disorder    • Shortness of breath    • Skin abnormality     sacral wound - covered with pad   • Sleep apnea    • Stented coronary artery    • Stroke [facility])     vision loss b/l  2005, residual R leg weakness   • Urinary frequency    • Use of cane as ambulatory aid    • Wears dentures    • Wears glasses    • Wears glasses    • Wheelchair dependent        Past Surgical History  Past Surgical History:   Procedure Laterality Date   • BACK SURGERY     • BRAIN SURGERY     • CARDIAC CATHETERIZATION      with stents   • CARDIAC CATHETERIZATION N/A 8/18/2023    Procedure: Cardiac Coronary Angiogram;  Surgeon: Gladys Craft MD;  Location: [facility] CARD[facility]; Service: Cardiology   • CEREBRAL ANEURYSM REPAIR      with coils   • COLONOSCOPY     • ESOPHAGOGASTRODUODENOSCOPY N/A 7/1/2016    Procedure: ESOPHAGOGASTRODUODENOSCOPY (EGD); Surgeon: Charlotte Chew MD;  Location: [facility] GI LAB; Service:    • GASTRIC BYPASS  11/19/2018   • HERNIA REPAIR     • HIATAL HERNIA REPAIR     • INFUSION PUMP IMPLANTATION Left     morphine   • INTRATHECAL PUMP IMPLANTATION Left 7/9/2020    Procedure: REVISION INTRATHECAL PAIN PUMP POCKET, LEFT ABDOMEN;  Surgeon: Avani Crain MD;  Location: [facility] MAIN OR;  Service: Neurosurgery   • KNEE ARTHROSCOPY Right    • KNEE ARTHROSCOPY Right    • PERONEAL NERVE DECOMPRESSION Right    • LA DEBRIDEMENT OPEN WOUND 20 SQ CM/< N/A 6/6/2019    Procedure: EXCISIONAL DEBRIDEMENT OF SACRAL DECUBITUS ULCER;  Surgeon: Rosales Avitia MD;  Location: [facility] Main OR;  Service: General   • LA ESOPHAGOGASTRODUODENOSCOPY TRANSORAL DIAGNOSTIC N/A 2/27/2017    Procedure: ESOPHAGOGASTRODUODENOSCOPY (EGD); Surgeon: Charlotte Chew MD;  Location: [facility] GI LAB; Service: Gastroenterology   • LA ESOPHAGOGASTRODUODENOSCOPY TRANSORAL DIAGNOSTIC N/A 8/23/2018    Procedure: ESOPHAGOGASTRODUODENOSCOPY (EGD) with biopsy;  Surgeon: Charlotte Chew MD;  Location: [facility];   Service: Gastroenterology   • LA IMPLTJ/RPLCMT ITHCL/EDRL DRUG NFS PRGRBL PUMP Left 10/13/2020    Procedure: EXPLORATION OF INTRATHECAL PAIN PUMP SYSTEM INTEGRITY FOR POSSIBLE REPLACEMENT OF CATHETER AND PUMP.;  Surgeon: Shayy Noriega MD;  Location: [facility];  Service: Neurosurgery   • NC IMPLTJ/RPLCMT ITHCL/EDRL DRUG NFS SUBQ RSVR N/A 1/19/2017    Procedure: REMOVAL / EXCHANGE INTRATHECAL PAIN PUMP;  Surgeon: Adora Romberg, MD;  Location: [facility];  Service: Orthopedics   • NC IMPLTJ/RPLCMT ITHCL/EDRL DRUG NFS SUBQ RSVR N/A 5/16/2016    Procedure: REPLACEMENT AND PROGRAM PUMP ;  Surgeon: Adora Romberg, MD;  Location[facility];  Service: Orthopedics   • NC PRQ IMPLTJ NSTIM ELECTRODE ARRAY EPIDURAL Right 3/17/2021    Procedure: INSERTION THORACIC DORSAL COLUMN SPINAL CORD STIMULATOR PERCUTANEOUS W IMPLANTABLE PULSE GENERATOR, RIGHT;  Surgeon: Shayy Noriega MD;  Location: [facility] MAIN OR;  Service: Neurosurgery       Summary   Pt presented with functional appearing oral and pharyngeal stage swallowing skills with materials administered today. The patient is assessed with regular solids and thin liquids. He is edentulous (has dentures at home), but has good mastication and transfer. No overt s/s aspiration observed with items. The patient does have a history of esophageal dysphagia, requiring dilatation. He is c/o food sticking when swallowing. Consider GI consult.      Risk/s for Aspiration: low      Recommended Diet: regular diet and thin liquids   Recommended Form of Meds: whole with liquid   Aspiration precautions and swallowing strategies: upright posture, small bites/sips and alternating bites and sips  Other Recommendations: Continue frequent oral care    Current Medical Status  Chief Complaint:   Chest pain   History of Present Illness:  Ioana Alvarez is a 63 y.o. male with a PMH of CAD, chronic pain on morphine pump, type 2 diabetes  who presented to [address] [place] [address] with chest pain  Underlying history of CAD with previous stent placement  Patient was admitted here from 8/18 through 8/21 due to chest pain  Patient underwent cardiac cath on 8/18, did not reveal any critical lesions and medical management was recommended. Medications were optimized and patient was discharged home however  He presented again today with substernal chest pressure, associated with shortness of breath palpitation nausea and diaphoresis   Evaluated by cardiology at [facility], concern for myopericarditis with recommendation to transfer to [facility] for cardiac MRI and further management    Allergies:  No known food allergies  Past medical history:  Please see H&P for details    Special Studies:  EGD 8/11/21:   IMPRESSION:  Esophageal plaques and rings. Biopsies and brushings obtained. Large hiatal hernia. Sleeve gastrectomy. Gastric erosions. Biopsied. Duodenitis. Biopsied.     Social/Education/Vocational Hx:  Pt lives with family    Swallow Information   Current Risks for Dysphagia & Aspiration: known history of dysphagia  Current Symptoms/Concerns: none  Current Diet: regular diet and thin liquids   Baseline Diet: regular diet and thin liquids    Baseline Assessment   Behavior/Cognition: alert  Speech/Language Status: able to participate in conversation and able to follow commands  Patient Positioning: upright in bed  Pain Status/Interventions/Response to Interventions: No report of or nonverbal indications of pain. Swallow Mechanism Exam  Facial: symmetrical  Labial: WFL  Lingual: WFL  Velum: symmetrical  Mandible: adequate ROM  Dentition: edentulous  Vocal quality:clear/adequate   Respiratory Status: on RA      Consistencies Assessed and Performance   Consistencies Administered: thin liquids and hard solids  Materials administered included pasta and meatballs with thin liquids     Oral Stage: WFL  Mastication was adequate with the materials administered today. Bolus formation and transfer were functional with no significant oral residue noted. No overt s/s reduced oral control.     Pharyngeal Stage: WFL  Swallow Mechanics:  Swallowing initiation appeared prompt. Laryngeal rise was observed and judged to be within functional limits. No coughing, throat clearing, change in vocal quality or respiratory status noted today.      Esophageal Concerns: patient has hx EGD with a globus sensation     Summary and Recommendations (see above)    Results Reviewed with: patient     Treatment Recommended: evaluation only

## 2023-08-28 LAB
ALBUMIN SERPL BCP-MCNC: 3.1 G/DL (ref 3.5–5)
ALP SERPL-CCNC: 48 U/L (ref 34–104)
ALT SERPL W P-5'-P-CCNC: 15 U/L (ref 7–52)
ANION GAP SERPL CALCULATED.3IONS-SCNC: 6 MMOL/L
AST SERPL W P-5'-P-CCNC: 13 U/L (ref 13–39)
BASOPHILS # BLD AUTO: 0.05 THOUSANDS/ÂΜL (ref 0–0.1)
BASOPHILS NFR BLD AUTO: 1 % (ref 0–1)
BILIRUB SERPL-MCNC: 0.3 MG/DL (ref 0.2–1)
BUN SERPL-MCNC: 13 MG/DL (ref 5–25)
CALCIUM ALBUM COR SERPL-MCNC: 8.5 MG/DL (ref 8.3–10.1)
CALCIUM SERPL-MCNC: 7.8 MG/DL (ref 8.4–10.2)
CHLORIDE SERPL-SCNC: 111 MMOL/L (ref 96–108)
CO2 SERPL-SCNC: 23 MMOL/L (ref 21–32)
CREAT SERPL-MCNC: 0.93 MG/DL (ref 0.6–1.3)
EOSINOPHIL # BLD AUTO: 0.29 THOUSAND/ÂΜL (ref 0–0.61)
EOSINOPHIL NFR BLD AUTO: 4 % (ref 0–6)
ERYTHROCYTE [DISTWIDTH] IN BLOOD BY AUTOMATED COUNT: 13.3 % (ref 11.6–15.1)
GFR SERPL CREATININE-BSD FRML MDRD: 88 ML/MIN/1.73SQ M
GLUCOSE SERPL-MCNC: 78 MG/DL (ref 65–140)
HCT VFR BLD AUTO: 39.2 % (ref 36.5–49.3)
HGB BLD-MCNC: 12.3 G/DL (ref 12–17)
IMM GRANULOCYTES # BLD AUTO: 0.04 THOUSAND/UL (ref 0–0.2)
IMM GRANULOCYTES NFR BLD AUTO: 1 % (ref 0–2)
LYMPHOCYTES # BLD AUTO: 1.68 THOUSANDS/ÂΜL (ref 0.6–4.47)
LYMPHOCYTES NFR BLD AUTO: 21 % (ref 14–44)
MCH RBC QN AUTO: 29.9 PG (ref 26.8–34.3)
MCHC RBC AUTO-ENTMCNC: 31.4 G/DL (ref 31.4–37.4)
MCV RBC AUTO: 95 FL (ref 82–98)
MONOCYTES # BLD AUTO: 0.57 THOUSAND/ÂΜL (ref 0.17–1.22)
MONOCYTES NFR BLD AUTO: 7 % (ref 4–12)
NEUTROPHILS # BLD AUTO: 5.27 THOUSANDS/ÂΜL (ref 1.85–7.62)
NEUTS SEG NFR BLD AUTO: 66 % (ref 43–75)
NRBC BLD AUTO-RTO: 0 /100 WBCS
PLATELET # BLD AUTO: 202 THOUSANDS/UL (ref 149–390)
PMV BLD AUTO: 10.9 FL (ref 8.9–12.7)
POTASSIUM SERPL-SCNC: 3.7 MMOL/L (ref 3.5–5.3)
PROT SERPL-MCNC: 5.5 G/DL (ref 6.4–8.4)
RBC # BLD AUTO: 4.11 MILLION/UL (ref 3.88–5.62)
SODIUM SERPL-SCNC: 140 MMOL/L (ref 135–147)
WBC # BLD AUTO: 7.9 THOUSAND/UL (ref 4.31–10.16)

## 2023-08-28 PROCEDURE — 99233 SBSQ HOSP IP/OBS HIGH 50: CPT | Performed by: INTERNAL MEDICINE

## 2023-08-28 PROCEDURE — 99232 SBSQ HOSP IP/OBS MODERATE 35: CPT | Performed by: INTERNAL MEDICINE

## 2023-08-28 PROCEDURE — 80053 COMPREHEN METABOLIC PANEL: CPT | Performed by: INTERNAL MEDICINE

## 2023-08-28 PROCEDURE — C9113 INJ PANTOPRAZOLE SODIUM, VIA: HCPCS | Performed by: INTERNAL MEDICINE

## 2023-08-28 PROCEDURE — 85025 COMPLETE CBC W/AUTO DIFF WBC: CPT | Performed by: INTERNAL MEDICINE

## 2023-08-28 RX ORDER — PANTOPRAZOLE SODIUM 40 MG/10ML
40 INJECTION, POWDER, LYOPHILIZED, FOR SOLUTION INTRAVENOUS EVERY 12 HOURS SCHEDULED
Status: DISCONTINUED | OUTPATIENT
Start: 2023-08-28 | End: 2023-08-30

## 2023-08-28 RX ORDER — AMOXICILLIN 250 MG
1 CAPSULE ORAL 2 TIMES DAILY
Status: DISCONTINUED | OUTPATIENT
Start: 2023-08-28 | End: 2023-08-30

## 2023-08-28 RX ORDER — POLYETHYLENE GLYCOL 3350 17 G/17G
17 POWDER, FOR SOLUTION ORAL DAILY PRN
Status: DISCONTINUED | OUTPATIENT
Start: 2023-08-28 | End: 2023-08-30

## 2023-08-28 RX ORDER — AMOXICILLIN 250 MG
1 CAPSULE ORAL
Status: DISCONTINUED | OUTPATIENT
Start: 2023-08-28 | End: 2023-08-28

## 2023-08-28 RX ADMIN — ACETAMINOPHEN 975 MG: 325 TABLET, FILM COATED ORAL at 21:05

## 2023-08-28 RX ADMIN — SENNOSIDES AND DOCUSATE SODIUM 1 TABLET: 50; 8.6 TABLET ORAL at 18:01

## 2023-08-28 RX ADMIN — IBUPROFEN 600 MG: 600 TABLET ORAL at 05:09

## 2023-08-28 RX ADMIN — HYDROMORPHONE HYDROCHLORIDE 8 MG: 2 TABLET ORAL at 18:07

## 2023-08-28 RX ADMIN — PANTOPRAZOLE SODIUM 40 MG: 40 INJECTION, POWDER, FOR SOLUTION INTRAVENOUS at 08:03

## 2023-08-28 RX ADMIN — COLCHICINE 0.6 MG: 0.6 TABLET ORAL at 18:01

## 2023-08-28 RX ADMIN — ATORVASTATIN CALCIUM 80 MG: 80 TABLET, FILM COATED ORAL at 18:01

## 2023-08-28 RX ADMIN — HYDROMORPHONE HYDROCHLORIDE 1.2 MG: 2 INJECTION, SOLUTION INTRAMUSCULAR; INTRAVENOUS; SUBCUTANEOUS at 21:53

## 2023-08-28 RX ADMIN — RANOLAZINE 1000 MG: 500 TABLET, FILM COATED, EXTENDED RELEASE ORAL at 09:24

## 2023-08-28 RX ADMIN — TAMSULOSIN HYDROCHLORIDE 0.4 MG: 0.4 CAPSULE ORAL at 15:30

## 2023-08-28 RX ADMIN — FOLIC ACID 1 MG: 1 TABLET ORAL at 09:25

## 2023-08-28 RX ADMIN — CARVEDILOL 12.5 MG: 12.5 TABLET, FILM COATED ORAL at 15:30

## 2023-08-28 RX ADMIN — HYDROMORPHONE HYDROCHLORIDE 8 MG: 2 TABLET ORAL at 05:09

## 2023-08-28 RX ADMIN — ASPIRIN 975 MG: 325 TABLET, COATED ORAL at 21:05

## 2023-08-28 RX ADMIN — ASPIRIN 81 MG CHEWABLE TABLET 81 MG: 81 TABLET CHEWABLE at 09:24

## 2023-08-28 RX ADMIN — ACETAMINOPHEN 975 MG: 325 TABLET, FILM COATED ORAL at 05:09

## 2023-08-28 RX ADMIN — GABAPENTIN 600 MG: 300 CAPSULE ORAL at 09:25

## 2023-08-28 RX ADMIN — HYDROMORPHONE HYDROCHLORIDE 8 MG: 2 TABLET ORAL at 13:58

## 2023-08-28 RX ADMIN — PANTOPRAZOLE SODIUM 40 MG: 40 INJECTION, POWDER, FOR SOLUTION INTRAVENOUS at 21:06

## 2023-08-28 RX ADMIN — AMLODIPINE BESYLATE 5 MG: 5 TABLET ORAL at 09:24

## 2023-08-28 RX ADMIN — SENNOSIDES AND DOCUSATE SODIUM 1 TABLET: 50; 8.6 TABLET ORAL at 12:48

## 2023-08-28 RX ADMIN — HYDROMORPHONE HYDROCHLORIDE 8 MG: 2 TABLET ORAL at 09:35

## 2023-08-28 RX ADMIN — ACETAMINOPHEN 975 MG: 325 TABLET, FILM COATED ORAL at 13:54

## 2023-08-28 RX ADMIN — GABAPENTIN 600 MG: 300 CAPSULE ORAL at 21:05

## 2023-08-28 RX ADMIN — GABAPENTIN 600 MG: 300 CAPSULE ORAL at 15:30

## 2023-08-28 RX ADMIN — ASPIRIN 975 MG: 325 TABLET, COATED ORAL at 15:29

## 2023-08-28 RX ADMIN — CARVEDILOL 12.5 MG: 12.5 TABLET, FILM COATED ORAL at 08:03

## 2023-08-28 RX ADMIN — BACLOFEN 10 MG: 10 TABLET ORAL at 21:06

## 2023-08-28 RX ADMIN — RANOLAZINE 1000 MG: 500 TABLET, FILM COATED, EXTENDED RELEASE ORAL at 21:05

## 2023-08-28 RX ADMIN — BACLOFEN 10 MG: 10 TABLET ORAL at 15:30

## 2023-08-28 RX ADMIN — BACLOFEN 10 MG: 10 TABLET ORAL at 09:25

## 2023-08-28 RX ADMIN — ENOXAPARIN SODIUM 40 MG: 40 INJECTION SUBCUTANEOUS at 08:03

## 2023-08-28 RX ADMIN — ISOSORBIDE MONONITRATE 60 MG: 60 TABLET, EXTENDED RELEASE ORAL at 09:25

## 2023-08-28 NOTE — PROGRESS NOTES
Cardiology Team 2 Progress Note - Mary Moncada 63 y.o. male MRN: [id number]    Unit/Bed#: [facility] 522-01 Encounter: [id number]    Subjective:   Patient seen and examined. Patient refused yesterday evening and today morning doses of Colchicine due to nausea. This morning his chest pain was worse at 9/10 with exacerbation from laying flat or taking a deep breath. After receiving pain medicine an hour later, he was much more comfortable. Hospital Course:   Mary Moncada is a 63y.o. year old male with a history of CAD with multiple stent placements on DAPT and diffuse pain with pain pump. After recent admission with elevated troponin and cath which revealed patent stents, he came back with continued chest pain suspicious for myopericarditis. He has responded well to ibuprofen but feels nauseous from colchicine. Echo shows no effusion at this time. Vitals: Blood pressure 152/84, pulse 65, temperature 97.8 °F (36.6 °C), resp. rate 20, height 6' (1.829 m), weight (!) 137 kg (301 lb), SpO2 95 %. , Body mass index is 40.82 kg/m².,   Orthostatic Blood Pressures    Flowsheet Row Most Recent Value   Blood Pressure 152/84 filed at 08/28/2023 1127   Patient Position - Orthostatic VS Lying filed at 08/26/2023 1604            Intake/Output Summary (Last 24 hours) at 8/28/2023 1250  Last data filed at 8/28/2023 0515  Gross per 24 hour   Intake 480 ml   Output 550 ml   Net -70 ml         Physical Exam:    GEN: [place] appears uncomfortable when bed is nearly flat, alert and oriented x 3, more comfortable on repeat exam an hour later with head of bed more elevated  HEENT:  Normocephalic, atraumatic  HEART: regular rhythm, regular rate, normal S1 and S2, friction rub audible on first exam but unable to appreciate on second exam an hour later  LUNGS: Clear to auscultation bilaterally; no wheezes, rales, or rhonchi; respiration nonlabored   ABDOMEN:  Normoactive bowel sounds, soft, no tenderness, no distention  EXTREMITIES: no edema  NEURO: no focal deficits  SKIN:  Dry, intact, warm to touch      Current Facility-Administered Medications:   •  acetaminophen (TYLENOL) tablet 975 mg, 975 mg, Oral, Q8H [address], Samy Banai, DO, 975 mg at 08/28/23 0509  •  amLODIPine (NORVASC) tablet 5 mg, 5 mg, Oral, Daily, Janice Maya, DO, 5 mg at 08/28/23 7848  •  ARIPiprazole (ABILIFY) tablet 10 mg, 10 mg, Oral, Daily, [place] Maya, DO  •  aspirin (ECOTRIN) EC tablet 975 mg, 975 mg, Oral, Q8H, Jones Taylor MD  •  atorvastatin (LIPITOR) tablet 80 mg, 80 mg, Oral, After [place] Bless, DO, 80 mg at 08/26/23 1722  •  baclofen tablet 10 mg, 10 mg, Oral, TID, [place] Maya, DO, 10 mg at 08/28/23 9354  •  carvedilol (COREG) tablet 12.5 mg, 12.5 mg, Oral, BID With Meals, [place] Maya, DO, 12.5 mg at 08/28/23 0803  •  colchicine (COLCRYS) tablet 0.6 mg, 0.6 mg, Oral, BID, Balaji Larios, DO, 0.6 mg at 08/27/23 0827  •  enoxaparin (LOVENOX) subcutaneous injection 40 mg, 40 mg, Subcutaneous, Daily, [place] Maya, DO, 40 mg at 72/19/53 9679  •  folic acid (FOLVITE) tablet 1 mg, 1 mg, Oral, Daily, [place] Maya, DO, 1 mg at 08/28/23 3425  •  gabapentin (NEURONTIN) capsule 600 mg, 600 mg, Oral, TID, Janice Maya, DO, 600 mg at 08/28/23 2982  •  HYDROmorphone (DILAUDID) injection 1.2 mg, 1.2 mg, Intravenous, Q3H PRN, Samy Banai, DO  •  HYDROmorphone (DILAUDID) tablet 6 mg, 6 mg, Oral, Q4H PRN **OR** HYDROmorphone (DILAUDID) tablet 8 mg, 8 mg, Oral, Q4H PRN, Samy Myles DO, 8 mg at 08/28/23 0935  •  insulin lispro (HumaLOG) 100 units/mL subcutaneous injection 1-5 Units, 1-5 Units, Subcutaneous, TID AC **AND** Fingerstick Glucose (POCT), , , TID AC, Janice Trejo DO  •  isosorbide mononitrate (IMDUR) 24 hr tablet 60 mg, 60 mg, Oral, Daily, Janice Trejo DO, 60 mg at 08/28/23 1436  •  LORazepam (ATIVAN) tablet 0.5 mg, 0.5 mg, Oral, Once PRN, Joanne Lundberg PA-C  •  methocarbamol (ROBAXIN) tablet 750 mg, 750 mg, Oral, Q6H PRN, Marco A Herrera DO  •  nitroglycerin (NITROSTAT) SL tablet 0.4 mg, 0.4 mg, Sublingual, Q5 Min PRN, Marco A Herrera DO, 0.4 mg at 08/24/23 1159  •  ondansetron (ZOFRAN) injection 4 mg, 4 mg, Intravenous, Q6H PRN, Marco A Herrera DO, 4 mg at 08/25/23 0757  •  pantoprazole (PROTONIX) injection 40 mg, 40 mg, Intravenous, Q24H [address], Samy Banai, DO, 40 mg at 08/28/23 0803  •  polyethylene glycol (MIRALAX) packet 17 g, 17 g, Oral, Daily PRN, Paige Conklin MD  •  ranolazine (RANEXA) 12 hr tablet 1,000 mg, 1,000 mg, Oral, Q12H [address], Marco A Herrera DO, 1,000 mg at 08/28/23 5181  •  senna-docusate sodium (SENOKOT S) 8.6-50 mg per tablet 1 tablet, 1 tablet, Oral, BID, Paige Conklin MD, 1 tablet at 08/28/23 1248  •  tamsulosin (FLOMAX) capsule 0.4 mg, 0.4 mg, Oral, Daily With Zollie Hummingbird, DO, 0.4 mg at 08/27/23 1626    Labs & Results:          Results from last 7 days   Lab Units 08/28/23  0504 08/27/23  0505 08/26/23  0452   POTASSIUM mmol/L 3.7 3.6 3.5   CO2 mmol/L 23 23 23   CHLORIDE mmol/L 111* 110* 108   BUN mg/dL 13 15 20   CREATININE mg/dL 0.93 0.97 1.07     Results from last 7 days   Lab Units 08/28/23  0504 08/27/23  0505 08/26/23  0452   HEMOGLOBIN g/dL 12.3 12.1 12.8   HEMATOCRIT % 39.2 39.7 38.6   PLATELETS Thousands/uL 202 201 214           Telemetry:   Personally reviewed by Piper Hester MD: No events, sinus rhythm    Imaging: nothing new to review, cardiac MRI not scheduled yet      VTE Prophylaxis: Sequential compression device (Venodyne)  and Enoxaparin (Lovenox)        Assessment:  Principal Problem:    Chest pain  Active Problems: Morbid obesity ([address])    CVA (cerebral vascular accident) ([address])    GERD (gastroesophageal reflux disease)    Opioid dependence, continuous ([address])    Type 2 diabetes mellitus with diabetic neuropathy, with long-term current use of insulin ([facility])    Major depressive disorder, recurrent, moderate ([address])    History of gastric sleeve procedure        1.  Chest pain      Plan:  1. Schedule Cardiac MRI with and without contrast, continue colchicine as most patients adjust after a few doses so nausea should live, discontinue ibuprofen and start aspirin 975 mg, do not restart plavix as patient's last stent was over a year ago(July 2022)      Case discussed and reviewed with Dr. Rufino Nicole and is pending their agreement with the assessment and plan. Thank you for involving us in the care of your patient. [employer] Everywhere records reviewed: yes    ** Please Note: Fluency DirectDictation voice to text software may have been used in the creation of this document.  **

## 2023-08-28 NOTE — ASSESSMENT & PLAN NOTE
Lab Results   Component Value Date    HGBA1C 5.3 07/14/2022       No results for input(s): "POCGLU" in the last 72 hours.     Blood Sugar Average: Last 72 hrs:  (P) 121   Not on  meds at home  Monitor

## 2023-08-28 NOTE — ASSESSMENT & PLAN NOTE
• Chronic pain syndrome  • Morphine administration thru intrathecal pain pump  • We will continue on oral Dilaudid and IV Dilaudid

## 2023-08-28 NOTE — PROGRESS NOTES
[address] [facility]  Progress Note  Name: Anya Russell  MRN: [id number]  Unit/Bed#: [facility] 914-80 I Date of Admission: 8/23/2023   Date of Service: 8/28/2023 I Hospital Day: 5    Assessment/Plan   * Chest pain  Assessment & Plan  · Chest pain secondary to myopericarditis  · Patient presented with recurrent chest pain. Recent discharge from [address] [place] [address] on 8/21/23  with Recent cardiac catheterization that did not reveal any critical lesions and medical management was recommended. · Patient was evaluated by cardiology at [facility]. Per cardiology EKG with some anterior ST depressions on the initial ECG which resolved on subsequent EKGs. His troponins are mildly elevated and flat without significant change. Cardiology recommended transfer to [address] [place] [address] for cardiac MRI/ further management to assess for myopericarditis   · Continue colchicine, patient states he developed nausea a few days ago from it but he is willing to try again. Discontinue ibuprofen and start high-dose aspirin taper over 3 weeks  · No Plavix until on high-dose aspirin  · Continue PPI twice daily  · Cardiac MRI ordered    History of gastric sleeve procedure  Assessment & Plan  · History of gastric sleeve procedure noted    Major depressive disorder, recurrent, moderate ([facility])  Assessment & Plan  · He is not on Abilify, it was discontinued    Type 2 diabetes mellitus with diabetic neuropathy, with long-term current use of insulin ([facility])  Assessment & Plan  Lab Results   Component Value Date    HGBA1C 5.3 07/14/2022       No results for input(s): "POCGLU" in the last 72 hours.     Blood Sugar Average: Last 72 hrs:  (P) 121   Not on  meds at home  Monitor    Opioid dependence, continuous ([facility])  Assessment & Plan  • Chronic pain syndrome  • Morphine administration thru intrathecal pain pump  • We will continue on oral Dilaudid and IV Dilaudid      GERD (gastroesophageal reflux disease)  Assessment & Plan  · Continue PPI    CVA (cerebral vascular accident) [facility])  Assessment & Plan  · History of CVA in   · Continue aspirin and statin    Morbid obesity ([address])  Assessment & Plan  · With history of prior gastric sleeve           VTE Pharmacologic Prophylaxis: VTE Score: 6 High Risk (Score >/= 5) - Pharmacological DVT Prophylaxis Ordered: enoxaparin (Lovenox). Sequential Compression Devices Ordered. Patient Centered Rounds: Discussed with nursing  Discussions with Specialists or Other Care Team Provider: Nursing, cardiology,     Education and Discussions with Family / Patient: Patient declined call to . Total Time Spent on Date of Encounter in care of patient: 35 minutes This time was spent on one or more of the following: performing physical exam; counseling and coordination of care; obtaining or reviewing history; documenting in the medical record; reviewing/ordering tests, medications or procedures; communicating with other healthcare professionals and discussing with patient's family/caregivers. Current Length of Stay: 5 day(s)  Current Patient Status: Inpatient   Certification Statement: The patient will continue to require additional inpatient hospital stay due to Myopericarditis, pending cardiac MRI  Discharge Plan: Anticipate discharge in 48-72 hrs to home. Code Status: Level 1 - Full Code    Subjective:   Patient was seen and evaluated bedside. Complaining of nausea after getting colchicine    Objective:     Vitals:   Temp (24hrs), Av.3 °F (36.8 °C), Min:97.8 °F (36.6 °C), Max:98.7 °F (37.1 °C)    Temp:  [97.8 °F (36.6 °C)-98.7 °F (37.1 °C)] 98.5 °F (36.9 °C)  HR:  [55-65] 55  Resp:  [20-21] 20  BP: (144-157)/(72-84) 144/72  SpO2:  [93 %-96 %] 93 %  Body mass index is 40.82 kg/m². Input and Output Summary (last 24 hours):      Intake/Output Summary (Last 24 hours) at 2023 1802  Last data filed at 2023 1700  Gross per 24 hour Intake 480 ml   Output 950 ml   Net -470 ml       Physical Exam:   Physical Exam  Constitutional:       General: He is not in acute distress. Appearance: He is obese. HENT:      Head: Atraumatic. Cardiovascular:      Rate and Rhythm: Normal rate and regular rhythm. Heart sounds: No murmur heard. No friction rub. No gallop. Pulmonary:      Effort: Pulmonary effort is normal. No respiratory distress. Breath sounds: Normal breath sounds. No wheezing. Abdominal:      General: Bowel sounds are normal. There is no distension. Palpations: Abdomen is soft. Tenderness: There is no abdominal tenderness. Musculoskeletal:         General: No swelling. Cervical back: Neck supple. Skin:     General: Skin is warm and dry. Neurological:      General: No focal deficit present. Mental Status: He is alert. Psychiatric:         Mood and Affect: Mood normal.          Additional Data:     Labs:  Results from last 7 days   Lab Units 08/28/23  0504   WBC Thousand/uL 7.90   HEMOGLOBIN g/dL 12.3   HEMATOCRIT % 39.2   PLATELETS Thousands/uL 202   NEUTROS PCT % 66   LYMPHS PCT % 21   MONOS PCT % 7   EOS PCT % 4     Results from last 7 days   Lab Units 08/28/23  0504   SODIUM mmol/L 140   POTASSIUM mmol/L 3.7   CHLORIDE mmol/L 111*   CO2 mmol/L 23   BUN mg/dL 13   CREATININE mg/dL 0.93   ANION GAP mmol/L 6   CALCIUM mg/dL 7.8*   ALBUMIN g/dL 3.1*   TOTAL BILIRUBIN mg/dL 0.30   ALK PHOS U/L 48   ALT U/L 15   AST U/L 13   GLUCOSE RANDOM mg/dL 78         Results from last 7 days   Lab Units 08/25/23  1107 08/24/23  1049 08/24/23  0546 08/23/23  2316   POC GLUCOSE mg/dl 121 106 117 113               Lines/Drains:  Invasive Devices     Peripheral Intravenous Line  Duration           Peripheral IV 08/25/23 Dorsal (posterior); Right Forearm 3 days                  Telemetry:  Telemetry Orders (From admission, onward)             24 Hour Telemetry Monitoring  Continuous x 24 Hours (Telem)    Question:  Reason for 24 Hour Telemetry  Answer:  Patients with stalin/shae/endocarditis; cardiac contusion                 Telemetry Reviewed: Normal Sinus Rhythm  Indication for Continued Telemetry Use: Stalin/shae/endocarditis             Imaging: Reviewed radiology reports from this admission including: ECHO    Recent Cultures (last 7 days):         Last 24 Hours Medication List:   Current Facility-Administered Medications   Medication Dose Route Frequency Provider Last Rate   • acetaminophen  975 mg Oral Q8H [facility] Samy Myles, DO     • amLODIPine  5 mg Oral Daily Brooke Hutton, DO     • aspirin  975 mg Oral Q8H Jones Taylor MD     • atorvastatin  80 mg Oral After Asia Grady, DO     • baclofen  10 mg Oral TID Brooke Hutton, DO     • carvedilol  12.5 mg Oral BID With Meals Brooke Hutton, DO     • colchicine  0.6 mg Oral BID Caesar Whittaker, DO     • enoxaparin  40 mg Subcutaneous Daily Brooke Hutton, DO     • folic acid  1 mg Oral Daily Brooke Hutton, DO     • gabapentin  600 mg Oral TID Brooke Hutton, DO     • HYDROmorphone  1.2 mg Intravenous Q3H PRN Samy Myles, DO     • HYDROmorphone  6 mg Oral Q4H PRN Samy Myles, DO      Or   • HYDROmorphone  8 mg Oral Q4H PRN Samy Myles, DO     • insulin lispro  1-5 Units Subcutaneous TID AC Brooke Hutton, DO     • isosorbide mononitrate  60 mg Oral Daily Brooke Hutton, DO     • LORazepam  0.5 mg Oral Once PRN Irma Zheng PA-C     • methocarbamol  750 mg Oral Q6H PRN Brooke Hutton, DO     • nitroglycerin  0.4 mg Sublingual Q5 Min PRN Brooke Hutton, DO     • ondansetron  4 mg Intravenous Q6H PRN Brooke Hutton, DO     • pantoprazole  40 mg Intravenous Q12H Gordon Birch MD     • polyethylene glycol  17 g Oral Daily PRN Molly Alberto MD     • ranolazine  1,000 mg Oral Q12H [facility] Brooke Hutton, DO     • senna-docusate sodium  1 tablet Oral BID Molly Alberto MD     • tamsulosin  0.4 mg Oral Daily With Dinner Brooke Hutton DO          Today, Patient Was Seen By: Jacky Warren MD    **Please Note: This note may have been constructed using a voice recognition system. **

## 2023-08-28 NOTE — ASSESSMENT & PLAN NOTE
· Chest pain secondary to myopericarditis  · Patient presented with recurrent chest pain. Recent discharge from [facility] on 8/21/23  with Recent cardiac catheterization that did not reveal any critical lesions and medical management was recommended. · Patient was evaluated by cardiology at [facility]. Per cardiology EKG with some anterior ST depressions on the initial ECG which resolved on subsequent EKGs. His troponins are mildly elevated and flat without significant change. Cardiology recommended transfer to [facility] for cardiac MRI/ further management to assess for myopericarditis   · Continue colchicine, patient states he developed nausea a few days ago from it but he is willing to try again.   Discontinue ibuprofen and start high-dose aspirin taper over 3 weeks  · No Plavix until on high-dose aspirin  · Continue PPI twice daily  · Cardiac MRI ordered

## 2023-08-29 LAB
ANION GAP SERPL CALCULATED.3IONS-SCNC: 8 MMOL/L
BUN SERPL-MCNC: 13 MG/DL (ref 5–25)
CALCIUM SERPL-MCNC: 8.1 MG/DL (ref 8.4–10.2)
CHLORIDE SERPL-SCNC: 111 MMOL/L (ref 96–108)
CO2 SERPL-SCNC: 22 MMOL/L (ref 21–32)
CREAT SERPL-MCNC: 0.94 MG/DL (ref 0.6–1.3)
ERYTHROCYTE [DISTWIDTH] IN BLOOD BY AUTOMATED COUNT: 13.2 % (ref 11.6–15.1)
GFR SERPL CREATININE-BSD FRML MDRD: 87 ML/MIN/1.73SQ M
GLUCOSE SERPL-MCNC: 125 MG/DL (ref 65–140)
GLUCOSE SERPL-MCNC: 92 MG/DL (ref 65–140)
GLUCOSE SERPL-MCNC: 92 MG/DL (ref 65–140)
HCT VFR BLD AUTO: 37.8 % (ref 36.5–49.3)
HGB BLD-MCNC: 12.1 G/DL (ref 12–17)
MCH RBC QN AUTO: 30.3 PG (ref 26.8–34.3)
MCHC RBC AUTO-ENTMCNC: 32 G/DL (ref 31.4–37.4)
MCV RBC AUTO: 95 FL (ref 82–98)
PLATELET # BLD AUTO: 200 THOUSANDS/UL (ref 149–390)
PMV BLD AUTO: 10.6 FL (ref 8.9–12.7)
POTASSIUM SERPL-SCNC: 3.8 MMOL/L (ref 3.5–5.3)
RBC # BLD AUTO: 4 MILLION/UL (ref 3.88–5.62)
SODIUM SERPL-SCNC: 141 MMOL/L (ref 135–147)
WBC # BLD AUTO: 9.79 THOUSAND/UL (ref 4.31–10.16)

## 2023-08-29 PROCEDURE — 82948 REAGENT STRIP/BLOOD GLUCOSE: CPT

## 2023-08-29 PROCEDURE — C9113 INJ PANTOPRAZOLE SODIUM, VIA: HCPCS | Performed by: INTERNAL MEDICINE

## 2023-08-29 PROCEDURE — 99232 SBSQ HOSP IP/OBS MODERATE 35: CPT | Performed by: INTERNAL MEDICINE

## 2023-08-29 PROCEDURE — 99233 SBSQ HOSP IP/OBS HIGH 50: CPT | Performed by: INTERNAL MEDICINE

## 2023-08-29 PROCEDURE — 85027 COMPLETE CBC AUTOMATED: CPT | Performed by: INTERNAL MEDICINE

## 2023-08-29 PROCEDURE — 80048 BASIC METABOLIC PNL TOTAL CA: CPT | Performed by: INTERNAL MEDICINE

## 2023-08-29 RX ADMIN — BACLOFEN 10 MG: 10 TABLET ORAL at 11:00

## 2023-08-29 RX ADMIN — GABAPENTIN 600 MG: 300 CAPSULE ORAL at 10:57

## 2023-08-29 RX ADMIN — HYDROMORPHONE HYDROCHLORIDE 1.2 MG: 2 INJECTION, SOLUTION INTRAMUSCULAR; INTRAVENOUS; SUBCUTANEOUS at 18:10

## 2023-08-29 RX ADMIN — ACETAMINOPHEN 975 MG: 325 TABLET, FILM COATED ORAL at 05:00

## 2023-08-29 RX ADMIN — TAMSULOSIN HYDROCHLORIDE 0.4 MG: 0.4 CAPSULE ORAL at 17:58

## 2023-08-29 RX ADMIN — SENNOSIDES AND DOCUSATE SODIUM 1 TABLET: 50; 8.6 TABLET ORAL at 17:58

## 2023-08-29 RX ADMIN — ISOSORBIDE MONONITRATE 60 MG: 60 TABLET, EXTENDED RELEASE ORAL at 10:57

## 2023-08-29 RX ADMIN — BACLOFEN 10 MG: 10 TABLET ORAL at 21:07

## 2023-08-29 RX ADMIN — AMLODIPINE BESYLATE 5 MG: 5 TABLET ORAL at 10:59

## 2023-08-29 RX ADMIN — RANOLAZINE 1000 MG: 500 TABLET, FILM COATED, EXTENDED RELEASE ORAL at 21:07

## 2023-08-29 RX ADMIN — ONDANSETRON 4 MG: 2 INJECTION INTRAMUSCULAR; INTRAVENOUS at 14:27

## 2023-08-29 RX ADMIN — ATORVASTATIN CALCIUM 80 MG: 80 TABLET, FILM COATED ORAL at 17:58

## 2023-08-29 RX ADMIN — HYDROMORPHONE HYDROCHLORIDE 8 MG: 2 TABLET ORAL at 15:33

## 2023-08-29 RX ADMIN — SENNOSIDES AND DOCUSATE SODIUM 1 TABLET: 50; 8.6 TABLET ORAL at 10:57

## 2023-08-29 RX ADMIN — FOLIC ACID 1 MG: 1 TABLET ORAL at 11:00

## 2023-08-29 RX ADMIN — RANOLAZINE 1000 MG: 500 TABLET, FILM COATED, EXTENDED RELEASE ORAL at 10:57

## 2023-08-29 RX ADMIN — HYDROMORPHONE HYDROCHLORIDE 8 MG: 2 TABLET ORAL at 11:04

## 2023-08-29 RX ADMIN — ASPIRIN 975 MG: 325 TABLET, COATED ORAL at 21:06

## 2023-08-29 RX ADMIN — ASPIRIN 975 MG: 325 TABLET, COATED ORAL at 05:00

## 2023-08-29 RX ADMIN — CARVEDILOL 12.5 MG: 12.5 TABLET, FILM COATED ORAL at 10:58

## 2023-08-29 RX ADMIN — COLCHICINE 0.6 MG: 0.6 TABLET ORAL at 17:58

## 2023-08-29 RX ADMIN — HYDROMORPHONE HYDROCHLORIDE 8 MG: 2 TABLET ORAL at 21:06

## 2023-08-29 RX ADMIN — CARVEDILOL 12.5 MG: 12.5 TABLET, FILM COATED ORAL at 17:58

## 2023-08-29 RX ADMIN — ASPIRIN 975 MG: 325 TABLET, COATED ORAL at 15:25

## 2023-08-29 RX ADMIN — GABAPENTIN 600 MG: 300 CAPSULE ORAL at 15:25

## 2023-08-29 RX ADMIN — HYDROMORPHONE HYDROCHLORIDE 8 MG: 2 TABLET ORAL at 04:56

## 2023-08-29 RX ADMIN — ENOXAPARIN SODIUM 40 MG: 40 INJECTION SUBCUTANEOUS at 11:00

## 2023-08-29 RX ADMIN — ACETAMINOPHEN 975 MG: 325 TABLET, FILM COATED ORAL at 21:07

## 2023-08-29 RX ADMIN — BACLOFEN 10 MG: 10 TABLET ORAL at 15:25

## 2023-08-29 RX ADMIN — COLCHICINE 0.6 MG: 0.6 TABLET ORAL at 10:58

## 2023-08-29 RX ADMIN — PANTOPRAZOLE SODIUM 40 MG: 40 INJECTION, POWDER, FOR SOLUTION INTRAVENOUS at 11:00

## 2023-08-29 RX ADMIN — GABAPENTIN 600 MG: 300 CAPSULE ORAL at 21:07

## 2023-08-29 RX ADMIN — PANTOPRAZOLE SODIUM 40 MG: 40 INJECTION, POWDER, FOR SOLUTION INTRAVENOUS at 21:08

## 2023-08-29 RX ADMIN — ACETAMINOPHEN 975 MG: 325 TABLET, FILM COATED ORAL at 15:25

## 2023-08-29 NOTE — ASSESSMENT & PLAN NOTE
Comprehensive Nutrition Assessment    Type and Reason for Visit: Initial, Consult      Nutrition Recommendations/Plan:    1. Diet adjusted to regular, allow double portions. PM/HS snacks. Ensure Enlive PRN with meals      Nutrition Assessment:     49 y.o. male who has a PMHx of GERD, DVT. Recent extended admission to [facility] from 1/18-2/17 for COVID 19 PNA, pneumomediastinum. Discharged to [facility]. Pt admitted to [facility] on 3/2 with acute on chronic hypoxic respiratory failure with recent COVID 19 PNA, initially requiring non-rebreather. Has been weaned to 5 L NC as of today. Noted pt is currently COVID negative. Consult received d/t c/o hunger and reports of recent wt loss. Pt very pleasant and understanding. His reported wt hx corroborated by EHR. Pt UBW is around 200-205 lb. He states he knew he was losing weight during his [facility] admission bc they weighed him almost daily, but shocker came when he had a standing weight at [facility] on 2/19 of 159 lb. He states he hasn't weighed this little since graduated from college 30 years ago. He understands reasoning (decreased PO intake, increased needs with COVID/WOB, and decreased mobility - states he barely got out of bed for a month). No hx of DM, but dealt with steroid induced hyperglycemia during previous admission. Currently no POC BG checks, but AM Glu WNL. Off steroids. Pt is open to double portions. He agrees to have Ensure sent with meals for when he cannot eat what is sent. Requests PM and HS snacks as this is when he gets most hungry. Since unable to accurately state pt hasn't received adequate nutrition for past couple weeks and majority of muscle atrophy r/t to disuse, pt does not currently meet malnutrition criteria despite significant wt loss of 45 lb (22% BW) during his 1 month stay at [facility].     Malnutrition Assessment:  Malnutrition Status:  Insufficient data    Context:  Acute illness     Findings of the 6 clinical · History of gastric sleeve procedure noted characteristics of malnutrition:   Energy Intake:  Mild decrease in energy intake (specify)  Weight Loss:  7.00 - Greater than 7.5% over 3 months(45 lb, 22% BW in 1 month during ED [facility] admission for COVID)     Body Fat Loss:  Unable to assess,     Muscle Mass Loss:  Unable to assess,    Fluid Accumulation:  No significant fluid accumulation,     Strength:  Not performed       Nutritionally Significant Medications:   Eliquis, Vit C, cefepime, MVI      Estimated Daily Nutrient Needs:  Energy (kcal): 7124-5120(MSJ x 1.3 x 1.1-1.2)  Protein (g): 100-120(1.3-1.6 gm/kg or ~20%)  Fluid (ml/day): 1 mL/kcal    Nutrition Related Findings:   Edema: none  Last BM: 3/8 - soft, formed      Wounds:    None       Current Nutrition Therapies:  Diet: cardiac  Supplements: none  Meal intake:   Patient Vitals for the past 168 hrs:   % Diet Eaten   03/03/21 1500 100 %   03/03/21 1302 100 %   03/03/21 0900 0 %       Anthropometric Measures:  · Height:  5' 8\" (172.7 cm)  · Current Body Wt:  75.8 kg (167 lb 1.7 oz)   · Admission Body Wt:  159 lb(72.1 kg)    · Usual Body Wt:  93 kg (205 lb)     · Ideal Body Wt:  154 lbs:  108.5 %    · BMI Category: Overweight (BMI 25.0-29. 9)       Wt Readings from Last 20 Encounters:   03/08/21 75.8 kg (167 lb 3.2 oz)   03/06/21 74.8 kg (165 lb) - 1st wt taken   02/19/21 159 lb - per pt/ standing wt at [facility]   02/16/21 75.5 kg (166 lb 6.4 oz)   01/13/21 92.9 kg (204 lb 12.9 oz)   05/18/20 93.4 kg (205 lb 14.6 oz)   08/12/19 94.4 kg (208 lb 3.2 oz)   05/18/19 93.3 kg (205 lb 11 oz)   05/17/19 95.3 kg (210 lb)   05/08/19 89.8 kg (198 lb)   05/08/19 94.5 kg (208 lb 6.4 oz)       Nutrition Diagnosis:   · Unintended weight loss related to catabolic illness, impaired respiratory function, increased demand for energy/nutrients, inadequate protein-energy intake as evidenced by weight loss(45 lb, 22% BW in 1 month)      Nutrition Interventions:   Food and/or Nutrient Delivery: Modify current diet, Snacks (specify), Start oral nutrition supplement  Nutrition Education and Counseling: No recommendations at this time  Coordination of Nutrition Care: Continue to monitor while inpatient    Goals:  continued PO >/=75% of meals with PRN snacks and ONS; stabilize wt loss       Nutrition Monitoring and Evaluation:   Behavioral-Environmental Outcomes: None identified  Food/Nutrient Intake Outcomes: Food and nutrient intake, Supplement intake  Physical Signs/Symptoms Outcomes: Biochemical data, Weight, Nutrition focused physical findings, Hemodynamic status    Discharge Planning:    Continue current diet, Continue oral nutrition supplement     Recent Labs     03/08/21  0327 03/07/21  0440 03/06/21  0007   GLU 81 90 100   BUN 12 12 16   CREA 0.59* 0.52* 0.47*   * 137 134*   K 4.4 4.2 4.3    100 100   CO2 29 33* 31   CA 9.2 9.2 8.8       No results for input(s): PREALB in the last 72 hours. No results for input(s): TRIGL in the last 72 hours. No results for input(s): GLUCPOC in the last 72 hours.     Lab Results   Component Value Date/Time    Hemoglobin A1c 6.4 (H) 02/01/2021 02:42 AM         Jerri Meek RD  Available via [employer]

## 2023-08-29 NOTE — PLAN OF CARE
Problem: PAIN - ADULT  Goal: Verbalizes/displays adequate comfort level or baseline comfort level  Description: Interventions:  - Encourage patient to monitor pain and request assistance  - Assess pain using appropriate pain scale  - Administer analgesics based on type and severity of pain and evaluate response  - Implement non-pharmacological measures as appropriate and evaluate response  - Consider cultural and social influences on pain and pain management  - Notify physician/advanced practitioner if interventions unsuccessful or patient reports new pain  Outcome: Progressing     Problem: INFECTION - ADULT  Goal: Absence or prevention of progression during hospitalization  Description: INTERVENTIONS:  - Assess and monitor for signs and symptoms of infection  - Monitor lab/diagnostic results  - Monitor all insertion sites, i.e. indwelling lines, tubes, and drains  - Monitor endotracheal if appropriate and nasal secretions for changes in amount and color  - [place] appropriate cooling/warming therapies per order  - Administer medications as ordered  - Instruct and encourage patient and family to use good hand hygiene technique  - Identify and instruct in appropriate isolation precautions for identified infection/condition  Outcome: Progressing     Problem: SAFETY ADULT  Goal: Patient will remain free of falls  Description: INTERVENTIONS:  - Educate patient/family on patient safety including physical limitations  - Instruct patient to call for assistance with activity   - Consult OT/PT to assist with strengthening/mobility   - Keep Call bell within reach  - Keep bed low and locked with side rails adjusted as appropriate  - Keep care items and personal belongings within reach  - Initiate and maintain comfort rounds  - Make Fall Risk Sign visible to staff  - Apply yellow socks and bracelet for high fall risk patients  - Consider moving patient to room near nurses station  Outcome: Progressing  Goal: Maintain or return to baseline ADL function  Description: INTERVENTIONS:  -  Assess patient's ability to carry out ADLs; assess patient's baseline for ADL function and identify physical deficits which impact ability to perform ADLs (bathing, care of mouth/teeth, toileting, grooming, dressing, etc.)  - Assess/evaluate cause of self-care deficits   - Assess range of motion  - Assess patient's mobility; develop plan if impaired  - Assess patient's need for assistive devices and provide as appropriate  - Encourage maximum independence but intervene and supervise when necessary  - Involve family in performance of ADLs  - Assess for home care needs following discharge   - Consider OT consult to assist with ADL evaluation and planning for discharge  - Provide patient education as appropriate  Outcome: Progressing  Goal: Maintains/Returns to pre admission functional level  Description: INTERVENTIONS:  - Perform BMAT or MOVE assessment daily.   - Set and communicate daily mobility goal to care team and patient/family/caregiver.    - Collaborate with rehabilitation services on mobility goals if consulted  - Out of bed for toileting  - Record patient progress and toleration of activity level   Outcome: Progressing     Problem: DISCHARGE PLANNING  Goal: Discharge to home or other facility with appropriate resources  Description: INTERVENTIONS:  - Identify barriers to discharge w/patient and caregiver  - Arrange for needed discharge resources and transportation as appropriate  - Identify discharge learning needs (meds, wound care, etc.)  - Arrange for interpretive services to assist at discharge as needed  - Refer to Case Management Department for coordinating discharge planning if the patient needs post-hospital services based on physician/advanced practitioner order or complex needs related to functional status, cognitive ability, or social support system  Outcome: Progressing     Problem: Knowledge Deficit  Goal: Patient/family/caregiver demonstrates understanding of disease process, treatment plan, medications, and discharge instructions  Description: Complete learning assessment and assess knowledge base.   Interventions:  - Provide teaching at level of understanding  - Provide teaching via preferred learning methods  Outcome: Progressing     Problem: CARDIOVASCULAR - ADULT  Goal: Maintains optimal cardiac output and hemodynamic stability  Description: INTERVENTIONS:  - Monitor I/O, vital signs and rhythm  - Monitor for S/S and trends of decreased cardiac output  - Administer and titrate ordered vasoactive medications to optimize hemodynamic stability  - Assess quality of pulses, skin color and temperature  - Assess for signs of decreased coronary artery perfusion  - Instruct patient to report change in severity of symptoms  Outcome: Progressing  Goal: Absence of cardiac dysrhythmias or at baseline rhythm  Description: INTERVENTIONS:  - Continuous cardiac monitoring, vital signs, obtain 12 lead EKG if ordered  - Administer antiarrhythmic and heart rate control medications as ordered  - Monitor electrolytes and administer replacement therapy as ordered  Outcome: Progressing     Problem: MOBILITY - ADULT  Goal: Maintain or return to baseline ADL function  Description: INTERVENTIONS:  -  Assess patient's ability to carry out ADLs; assess patient's baseline for ADL function and identify physical deficits which impact ability to perform ADLs (bathing, care of mouth/teeth, toileting, grooming, dressing, etc.)  - Assess/evaluate cause of self-care deficits   - Assess range of motion  - Assess patient's mobility; develop plan if impaired  - Assess patient's need for assistive devices and provide as appropriate  - Encourage maximum independence but intervene and supervise when necessary  - Involve family in performance of ADLs  - Assess for home care needs following discharge   - Consider OT consult to assist with ADL evaluation and planning for discharge  - Provide patient education as appropriate  Outcome: Progressing  Goal: Maintains/Returns to pre admission functional level  Description: INTERVENTIONS:  - Perform BMAT or MOVE assessment daily.   - Set and communicate daily mobility goal to care team and patient/family/caregiver.    - Collaborate with rehabilitation services on mobility goals if consulted  - Out of bed for toileting  - Record patient progress and toleration of activity level   Outcome: Progressing

## 2023-08-29 NOTE — PROGRESS NOTES
[facility]  Progress Note  Name: Minal Sanchez  MRN: [id number]  [facility] Date of Admission: 8/23/2023   Date of Service: 8/29/2023 I Hospital Day: 6    Assessment/Plan   * Chest pain  Assessment & Plan  · Chest pain secondary to myopericarditis  · Patient presented with recurrent chest pain. Recent discharge from [address] [place] [address] on 8/21/23  with Recent cardiac catheterization that did not reveal any critical lesions and medical management was recommended. · Patient was evaluated by cardiology at [facility]. Per cardiology EKG with some anterior ST depressions on the initial ECG which resolved on subsequent EKGs. His troponins are mildly elevated and flat without significant change. Cardiology recommended transfer to [address] [place] [address] for cardiac MRI/ further management to assess for myopericarditis   · Continue colchicine, patient states he developed nausea a few days ago from it but he is willing to try again.   Discontinue ibuprofen and start high-dose aspirin taper over 3 weeks  · No Plavix until on high-dose aspirin  · Continue PPI twice daily  · Cardiac MRI pending    History of gastric sleeve procedure  Assessment & Plan  · History of gastric sleeve procedure noted    Major depressive disorder, recurrent, moderate ([address])  Assessment & Plan  · He is not on Abilify, it was discontinued    Type 2 diabetes mellitus with diabetic neuropathy, with long-term current use of insulin [facility])  Assessment & Plan  Lab Results   Component Value Date    HGBA1C 5.3 07/14/2022       Recent Labs     08/29/23  1638   POCGLU 92       Blood Sugar Average: Last 72 hrs:  (P) 92   Not on  meds at home  Monitor    Opioid dependence, continuous (HCC)  Assessment & Plan  • Chronic pain syndrome  • Morphine administration thru intrathecal pain pump  • We will continue on oral Dilaudid and IV Dilaudid      GERD (gastroesophageal reflux disease)  Assessment & Plan  · Continue PPI    CVA (cerebral vascular accident) [facility])  Assessment & Plan  · History of CVA in   · Continue aspirin and statin    Morbid obesity ([address])  Assessment & Plan  · With history of prior gastric sleeve         VTE Pharmacologic Prophylaxis: VTE Score: 6 High Risk (Score >/= 5) - Pharmacological DVT Prophylaxis Ordered: enoxaparin (Lovenox). Sequential Compression Devices Ordered. Patient Centered Rounds: I performed bedside rounds with nursing staff today. Discussions with Specialists or Other Care Team Provider: Nursing, case management, cardiology    Education and Discussions with Family / Patient: Patient declined call to . Total Time Spent on Date of Encounter in care of patient: 35 minutes This time was spent on one or more of the following: performing physical exam; counseling and coordination of care; obtaining or reviewing history; documenting in the medical record; reviewing/ordering tests, medications or procedures; communicating with other healthcare professionals and discussing with patient's family/caregivers. Current Length of Stay: 6 day(s)  Current Patient Status: Inpatient   Certification Statement: The patient will continue to require additional inpatient hospital stay due to Chest pain, pending cardiac MRI  Discharge Plan: Anticipate discharge in 48 hrs to home. Code Status: Level 1 - Full Code    Subjective:   Patient was seen evaluated bedside. Complaining of nausea from colchicine    Objective:     Vitals:   Temp (24hrs), Av.5 °F (36.9 °C), Min:98.3 °F (36.8 °C), Max:98.6 °F (37 °C)    Temp:  [98.3 °F (36.8 °C)-98.6 °F (37 °C)] 98.5 °F (36.9 °C)  HR:  [59-71] 66  Resp:  [16-18] 18  BP: (118-164)/(46-88) 123/83  SpO2:  [91 %-97 %] 95 %  Body mass index is 40.82 kg/m². Input and Output Summary (last 24 hours):      Intake/Output Summary (Last 24 hours) at 2023 1705  Last data filed at 2023 1300  Gross per 24 hour   Intake 0 ml Output 400 ml   Net -400 ml       Physical Exam:   Physical Exam  Constitutional:       General: He is not in acute distress. Appearance: He is obese. HENT:      Head: Atraumatic. Cardiovascular:      Rate and Rhythm: Normal rate and regular rhythm. Heart sounds: No murmur heard. No friction rub. No gallop. Pulmonary:      Effort: Pulmonary effort is normal. No respiratory distress. Breath sounds: Normal breath sounds. No wheezing. Abdominal:      General: Bowel sounds are normal. There is no distension. Palpations: Abdomen is soft. Tenderness: There is no abdominal tenderness. Musculoskeletal:         General: No swelling. Cervical back: Neck supple. Skin:     General: Skin is warm and dry. Neurological:      General: No focal deficit present. Mental Status: He is alert. Psychiatric:         Mood and Affect: Mood normal.          Additional Data:     Labs:  Results from last 7 days   Lab Units 08/29/23  0450 08/28/23  0504   WBC Thousand/uL 9.79 7.90   HEMOGLOBIN g/dL 12.1 12.3   HEMATOCRIT % 37.8 39.2   PLATELETS Thousands/uL 200 202   NEUTROS PCT %  --  66   LYMPHS PCT %  --  21   MONOS PCT %  --  7   EOS PCT %  --  4     Results from last 7 days   Lab Units 08/29/23  0450 08/28/23  0504   SODIUM mmol/L 141 140   POTASSIUM mmol/L 3.8 3.7   CHLORIDE mmol/L 111* 111*   CO2 mmol/L 22 23   BUN mg/dL 13 13   CREATININE mg/dL 0.94 0.93   ANION GAP mmol/L 8 6   CALCIUM mg/dL 8.1* 7.8*   ALBUMIN g/dL  --  3.1*   TOTAL BILIRUBIN mg/dL  --  0.30   ALK PHOS U/L  --  48   ALT U/L  --  15   AST U/L  --  13   GLUCOSE RANDOM mg/dL 92 78         Results from last 7 days   Lab Units 08/29/23  1638 08/25/23  1107 08/24/23  1049 08/24/23  0546 08/23/23  2316   POC GLUCOSE mg/dl 92 121 106 117 113               Lines/Drains:  Invasive Devices     Peripheral Intravenous Line  Duration           Peripheral IV 08/29/23 Dorsal (posterior); Left Forearm <1 day Telemetry:  Telemetry Orders (From admission, onward)             24 Hour Telemetry Monitoring  Continuous x 24 Hours (Telem)        Expiring   Question:  Reason for 24 Hour Telemetry  Answer:  PCI/EP study (including pacer and ICD implementation), Cardiac surgery, MI, abnormal cardiac cath, and chest pain- rule out MI                 Telemetry Reviewed: Normal Sinus Rhythm  Indication for Continued Telemetry Use: Stalin/shae/endocarditis             Imaging: Reviewed radiology reports from this admission including: ECHO    Recent Cultures (last 7 days):         Last 24 Hours Medication List:   Current Facility-Administered Medications   Medication Dose Route Frequency Provider Last Rate   • acetaminophen  975 mg Oral Q8H [facility] Samy Banai, DO     • amLODIPine  5 mg Oral Daily Emily Foil, DO     • aspirin  975 mg Oral Q8H Jones Taylor MD     • atorvastatin  80 mg Oral After Belford Shove, DO     • baclofen  10 mg Oral TID Emily Foil, DO     • carvedilol  12.5 mg Oral BID With Meals Emily Foil, DO     • colchicine  0.6 mg Oral BID Lilia Yi, DO     • enoxaparin  40 mg Subcutaneous Daily Emily Foil, DO     • folic acid  1 mg Oral Daily Emily Foil, DO     • gabapentin  600 mg Oral TID Emily Foil, DO     • HYDROmorphone  1.2 mg Intravenous Q3H PRN Samy Banai, DO     • HYDROmorphone  6 mg Oral Q4H PRN Samy Banai, DO      Or   • HYDROmorphone  8 mg Oral Q4H PRN Samy Banai, DO     • insulin lispro  1-5 Units Subcutaneous TID AC Emily Foil, DO     • isosorbide mononitrate  60 mg Oral Daily Emily Foil, DO     • LORazepam  0.5 mg Oral Once PRN Caryle Cai, PA-C     • methocarbamol  750 mg Oral Q6H PRN Emily Foil, DO     • nitroglycerin  0.4 mg Sublingual Q5 Min PRN Emily Foil, DO     • ondansetron  4 mg Intravenous Q6H PRN Emily Foil, DO     • pantoprazole  40 mg Intravenous Q12H [facility] Frida Lemus MD     • polyethylene glycol  17 g Oral Daily PRN Frida Lemus, MD     • ranolazine  1,000 mg Oral Q12H [facility],      • senna-docusate sodium  1 tablet Oral BID Jacky Warren MD     • tamsulosin  0.4 mg Oral Daily With [address], DO          Today, Patient Was Seen By: Jacky Warren MD    **Please Note: This note may have been constructed using a voice recognition system. **

## 2023-08-29 NOTE — ASSESSMENT & PLAN NOTE
Lab Results   Component Value Date    HGBA1C 5.3 07/14/2022       Recent Labs     08/29/23  1638   POCGLU 92       Blood Sugar Average: Last 72 hrs:  (P) 92   Not on  meds at home  Monitor

## 2023-08-29 NOTE — ASSESSMENT & PLAN NOTE
· Chest pain secondary to myopericarditis  · Patient presented with recurrent chest pain. Recent discharge from [address] [place] [address] on 8/21/23  with Recent cardiac catheterization that did not reveal any critical lesions and medical management was recommended. · Patient was evaluated by cardiology at [facility]. Per cardiology EKG with some anterior ST depressions on the initial ECG which resolved on subsequent EKGs. His troponins are mildly elevated and flat without significant change. Cardiology recommended transfer to [address] [place] [address] for cardiac MRI/ further management to assess for myopericarditis   · Continue colchicine, patient states he developed nausea a few days ago from it but he is willing to try again.   Discontinue ibuprofen and start high-dose aspirin taper over 3 weeks  · No Plavix until on high-dose aspirin  · Continue PPI twice daily  · Cardiac MRI pending

## 2023-08-29 NOTE — PROGRESS NOTES
Progress Note - Cardiology   Tanisha Cole 63 y.o. male MRN: [id number]  Unit/Bed#: [facility] 522-01 Encounter: [id number]    Assessment:  1. Myopericarditis   - Cardiac MRI pending to delineate whether myopericarditis or myocarditis  - Reached out to MRI again today to try to get MRI scheduled however they stated that due to the spinal stimulator there is a risk/benefit analysis that needs to be done. Hopeful to get this scheduled tomorrow, asked MRI to reach out to me if there is any difficulty scheduling it again      Plan:  - Recommend to continue colchicine, aspirin 975 mg 3 times daily with taper over the next 3 weeks  - If MRI demonstrates no pericardial involvement can consider discontinuing anti-inflammatory therapy    Subjective/Objective   Chief Complaint: Chest pain    Subjective: Patient was upset when I arrived in his room this morning. He was confused about meds that he was getting and wanted to speak with a physician. After speaking with patient he was agreeable to taking medications. He still has nausea with the colchicine however he said his nausea never went away even when the colchicine was discontinued. He has had no further vomiting. He does state that his chest pain is still significant but unchanged. Patient denies palpitations, shortness of breath, vomiting, diarrhea.     Objective:     Vitals: /83   Pulse 66   Temp 98.5 °F (36.9 °C) (Oral)   Resp 18   Ht 6' (1.829 m)   Wt (!) 137 kg (301 lb)   SpO2 95%   BMI 40.82 kg/m²   Vitals:    08/24/23 1215 08/24/23 1245   Weight: (!) 137 kg (301 lb) (!) 137 kg (301 lb)     Orthostatic Blood Pressures    Flowsheet Row Most Recent Value   Blood Pressure 123/83 filed at 08/29/2023 1543   Patient Position - Orthostatic VS Sitting filed at 08/29/2023 1054            Intake/Output Summary (Last 24 hours) at 8/29/2023 1627  Last data filed at 8/29/2023 1300  Gross per 24 hour   Intake 0 ml   Output 800 ml   Net -800 ml       Invasive Devices     Peripheral Intravenous Line  Duration           Peripheral IV 08/29/23 Dorsal (posterior); Left Forearm <1 day                Review of Systems: Negative except otherwise stated in HPI. Physical Exam:   General Appearance:    Alert, cooperative, no distress, appears stated age   Head:    Normocephalic, without obvious abnormality, atraumatic   Eyes:    PERRL, conjunctiva/corneas clear, EOM's intact      Neck:   Supple, symmetrical, trachea midline, noJVD   Lungs:     Clear to auscultation bilaterally, respirations unlabored   Chest wall:    No tenderness or deformity. Heart:   Regular rate and rhythm. S1 and S2 normal, no murmur, rub   or gallop   Abdomen:     Soft, non-tender, bowel sounds active all four quadrants,    Extremities:   Extremities normal, atraumatic, no cyanosis or edema   Pulses:   2+ and symmetric all extremities   Neurologic:  Cooperates with exam.  Moves all 4 extremities spontaneously. Awake alert and oriented x3. Lab Results:   I have personally reviewed pertinent lab results.     CBC with diff:   Results from last 7 days   Lab Units 08/29/23  0450   WBC Thousand/uL 9.79   RBC Million/uL 4.00   HEMOGLOBIN g/dL 12.1   HEMATOCRIT % 37.8   MCV fL 95   MCH pg 30.3   MCHC g/dL 32.0   RDW % 13.2   MPV fL 10.6   PLATELETS Thousands/uL 200     CMP:   Results from last 7 days   Lab Units 08/29/23  0450 08/28/23  0504   SODIUM mmol/L 141 140   CHLORIDE mmol/L 111* 111*   CO2 mmol/L 22 23   BUN mg/dL 13 13   CREATININE mg/dL 0.94 0.93   CALCIUM mg/dL 8.1* 7.8*   AST U/L  --  13   ALT U/L  --  15   ALK PHOS U/L  --  48   EGFR ml/min/1.73sq m 87 88     HS Troponin:   0   Lab Value Date/Time    HSTNI 8,176 (H) 08/19/2023 0436    HSTNI0 497 (H) 08/23/2023 1152    HSTNI2 494 (H) 08/23/2023 1401    HSTNI4 533 (H) 08/23/2023 1607    HSTNI4 16,943 (H) 08/18/2023 2110     BNP:   Results from last 7 days   Lab Units 08/29/23  0450   POTASSIUM mmol/L 3.8   CHLORIDE mmol/L 111*   CO2 mmol/L 22 BUN mg/dL 13   CREATININE mg/dL 0.94   CALCIUM mg/dL 8.1*   EGFR ml/min/1.73sq m 87     Imaging: I have personally reviewed pertinent reports.    and I have personally reviewed pertinent films in PACS

## 2023-08-30 LAB
ANION GAP SERPL CALCULATED.3IONS-SCNC: 8 MMOL/L
BUN SERPL-MCNC: 12 MG/DL (ref 5–25)
CALCIUM SERPL-MCNC: 8.1 MG/DL (ref 8.4–10.2)
CHLORIDE SERPL-SCNC: 106 MMOL/L (ref 96–108)
CO2 SERPL-SCNC: 22 MMOL/L (ref 21–32)
CREAT SERPL-MCNC: 0.96 MG/DL (ref 0.6–1.3)
ERYTHROCYTE [DISTWIDTH] IN BLOOD BY AUTOMATED COUNT: 13.3 % (ref 11.6–15.1)
GFR SERPL CREATININE-BSD FRML MDRD: 85 ML/MIN/1.73SQ M
GLUCOSE SERPL-MCNC: 118 MG/DL (ref 65–140)
GLUCOSE SERPL-MCNC: 87 MG/DL (ref 65–140)
GLUCOSE SERPL-MCNC: 90 MG/DL (ref 65–140)
GLUCOSE SERPL-MCNC: 93 MG/DL (ref 65–140)
GLUCOSE SERPL-MCNC: 96 MG/DL (ref 65–140)
HCT VFR BLD AUTO: 38.6 % (ref 36.5–49.3)
HGB BLD-MCNC: 12.2 G/DL (ref 12–17)
MCH RBC QN AUTO: 29.7 PG (ref 26.8–34.3)
MCHC RBC AUTO-ENTMCNC: 31.6 G/DL (ref 31.4–37.4)
MCV RBC AUTO: 94 FL (ref 82–98)
PLATELET # BLD AUTO: 204 THOUSANDS/UL (ref 149–390)
PMV BLD AUTO: 10.6 FL (ref 8.9–12.7)
POTASSIUM SERPL-SCNC: 3.8 MMOL/L (ref 3.5–5.3)
RBC # BLD AUTO: 4.11 MILLION/UL (ref 3.88–5.62)
SODIUM SERPL-SCNC: 136 MMOL/L (ref 135–147)
WBC # BLD AUTO: 8.59 THOUSAND/UL (ref 4.31–10.16)

## 2023-08-30 PROCEDURE — 85027 COMPLETE CBC AUTOMATED: CPT | Performed by: INTERNAL MEDICINE

## 2023-08-30 PROCEDURE — 80048 BASIC METABOLIC PNL TOTAL CA: CPT | Performed by: INTERNAL MEDICINE

## 2023-08-30 PROCEDURE — 99233 SBSQ HOSP IP/OBS HIGH 50: CPT | Performed by: INTERNAL MEDICINE

## 2023-08-30 PROCEDURE — 82948 REAGENT STRIP/BLOOD GLUCOSE: CPT

## 2023-08-30 PROCEDURE — 99232 SBSQ HOSP IP/OBS MODERATE 35: CPT | Performed by: INTERNAL MEDICINE

## 2023-08-30 RX ORDER — BACLOFEN 10 MG/1
10 TABLET ORAL 3 TIMES DAILY
Status: DISCONTINUED | OUTPATIENT
Start: 2023-08-30 | End: 2023-08-31 | Stop reason: HOSPADM

## 2023-08-30 RX ORDER — METOCLOPRAMIDE HYDROCHLORIDE 5 MG/ML
10 INJECTION INTRAMUSCULAR; INTRAVENOUS ONCE
Status: COMPLETED | OUTPATIENT
Start: 2023-08-30 | End: 2023-08-30

## 2023-08-30 RX ORDER — RANOLAZINE 500 MG/1
1000 TABLET, EXTENDED RELEASE ORAL EVERY 12 HOURS SCHEDULED
Status: DISCONTINUED | OUTPATIENT
Start: 2023-08-30 | End: 2023-08-31 | Stop reason: HOSPADM

## 2023-08-30 RX ORDER — PANTOPRAZOLE SODIUM 40 MG/1
40 TABLET, DELAYED RELEASE ORAL
Status: DISCONTINUED | OUTPATIENT
Start: 2023-08-30 | End: 2023-08-31 | Stop reason: HOSPADM

## 2023-08-30 RX ORDER — AMLODIPINE BESYLATE 5 MG/1
5 TABLET ORAL DAILY
Status: DISCONTINUED | OUTPATIENT
Start: 2023-08-30 | End: 2023-08-31 | Stop reason: HOSPADM

## 2023-08-30 RX ORDER — ISOSORBIDE MONONITRATE 60 MG/1
60 TABLET, EXTENDED RELEASE ORAL DAILY
Status: DISCONTINUED | OUTPATIENT
Start: 2023-08-30 | End: 2023-08-31 | Stop reason: HOSPADM

## 2023-08-30 RX ORDER — BISACODYL 10 MG
10 SUPPOSITORY, RECTAL RECTAL DAILY PRN
Status: DISCONTINUED | OUTPATIENT
Start: 2023-08-30 | End: 2023-08-31

## 2023-08-30 RX ORDER — FOLIC ACID 1 MG/1
1 TABLET ORAL DAILY
Status: DISCONTINUED | OUTPATIENT
Start: 2023-08-30 | End: 2023-08-31 | Stop reason: HOSPADM

## 2023-08-30 RX ORDER — ENOXAPARIN SODIUM 100 MG/ML
40 INJECTION SUBCUTANEOUS DAILY
Status: DISCONTINUED | OUTPATIENT
Start: 2023-08-30 | End: 2023-08-31 | Stop reason: HOSPADM

## 2023-08-30 RX ORDER — COLCHICINE 0.6 MG/1
0.6 TABLET ORAL 2 TIMES DAILY
Status: DISCONTINUED | OUTPATIENT
Start: 2023-08-30 | End: 2023-08-31

## 2023-08-30 RX ORDER — GABAPENTIN 300 MG/1
600 CAPSULE ORAL 3 TIMES DAILY
Status: DISCONTINUED | OUTPATIENT
Start: 2023-08-30 | End: 2023-08-31 | Stop reason: HOSPADM

## 2023-08-30 RX ORDER — PANTOPRAZOLE SODIUM 40 MG/10ML
40 INJECTION, POWDER, LYOPHILIZED, FOR SOLUTION INTRAVENOUS EVERY 12 HOURS SCHEDULED
Status: DISCONTINUED | OUTPATIENT
Start: 2023-08-30 | End: 2023-08-30

## 2023-08-30 RX ORDER — CARVEDILOL 12.5 MG/1
12.5 TABLET ORAL 2 TIMES DAILY WITH MEALS
Status: DISCONTINUED | OUTPATIENT
Start: 2023-08-30 | End: 2023-08-31 | Stop reason: HOSPADM

## 2023-08-30 RX ORDER — AMOXICILLIN 250 MG
1 CAPSULE ORAL 2 TIMES DAILY
Status: DISCONTINUED | OUTPATIENT
Start: 2023-08-30 | End: 2023-08-31 | Stop reason: HOSPADM

## 2023-08-30 RX ADMIN — ACETAMINOPHEN 975 MG: 325 TABLET, FILM COATED ORAL at 21:06

## 2023-08-30 RX ADMIN — CARVEDILOL 12.5 MG: 12.5 TABLET, FILM COATED ORAL at 17:40

## 2023-08-30 RX ADMIN — CARVEDILOL 12.5 MG: 12.5 TABLET, FILM COATED ORAL at 06:13

## 2023-08-30 RX ADMIN — TAMSULOSIN HYDROCHLORIDE 0.4 MG: 0.4 CAPSULE ORAL at 15:53

## 2023-08-30 RX ADMIN — PANTOPRAZOLE SODIUM 40 MG: 40 TABLET, DELAYED RELEASE ORAL at 15:56

## 2023-08-30 RX ADMIN — BACLOFEN 10 MG: 10 TABLET ORAL at 15:54

## 2023-08-30 RX ADMIN — GABAPENTIN 600 MG: 300 CAPSULE ORAL at 15:54

## 2023-08-30 RX ADMIN — ISOSORBIDE MONONITRATE 60 MG: 60 TABLET, EXTENDED RELEASE ORAL at 06:13

## 2023-08-30 RX ADMIN — FOLIC ACID 1 MG: 1 TABLET ORAL at 06:13

## 2023-08-30 RX ADMIN — BACLOFEN 10 MG: 10 TABLET ORAL at 06:13

## 2023-08-30 RX ADMIN — SENNOSIDES AND DOCUSATE SODIUM 1 TABLET: 50; 8.6 TABLET ORAL at 06:13

## 2023-08-30 RX ADMIN — METOCLOPRAMIDE HYDROCHLORIDE 10 MG: 5 INJECTION INTRAMUSCULAR; INTRAVENOUS at 17:49

## 2023-08-30 RX ADMIN — ASPIRIN 975 MG: 325 TABLET, COATED ORAL at 21:06

## 2023-08-30 RX ADMIN — GABAPENTIN 600 MG: 300 CAPSULE ORAL at 20:30

## 2023-08-30 RX ADMIN — HYDROMORPHONE HYDROCHLORIDE 8 MG: 2 TABLET ORAL at 03:31

## 2023-08-30 RX ADMIN — AMLODIPINE BESYLATE 5 MG: 5 TABLET ORAL at 06:13

## 2023-08-30 RX ADMIN — HYDROMORPHONE HYDROCHLORIDE 8 MG: 2 TABLET ORAL at 15:54

## 2023-08-30 RX ADMIN — SENNOSIDES AND DOCUSATE SODIUM 1 TABLET: 50; 8.6 TABLET ORAL at 17:40

## 2023-08-30 RX ADMIN — HYDROMORPHONE HYDROCHLORIDE 8 MG: 2 TABLET ORAL at 11:44

## 2023-08-30 RX ADMIN — RANOLAZINE 1000 MG: 500 TABLET, FILM COATED, EXTENDED RELEASE ORAL at 17:40

## 2023-08-30 RX ADMIN — ASPIRIN 975 MG: 325 TABLET, COATED ORAL at 13:07

## 2023-08-30 RX ADMIN — RANOLAZINE 1000 MG: 500 TABLET, FILM COATED, EXTENDED RELEASE ORAL at 06:13

## 2023-08-30 RX ADMIN — COLCHICINE 0.6 MG: 0.6 TABLET ORAL at 17:40

## 2023-08-30 RX ADMIN — BACLOFEN 10 MG: 10 TABLET ORAL at 20:30

## 2023-08-30 RX ADMIN — ONDANSETRON 4 MG: 2 INJECTION INTRAMUSCULAR; INTRAVENOUS at 16:29

## 2023-08-30 RX ADMIN — PANTOPRAZOLE SODIUM 40 MG: 40 TABLET, DELAYED RELEASE ORAL at 11:44

## 2023-08-30 RX ADMIN — ATORVASTATIN CALCIUM 80 MG: 80 TABLET, FILM COATED ORAL at 17:40

## 2023-08-30 RX ADMIN — ENOXAPARIN SODIUM 40 MG: 40 INJECTION SUBCUTANEOUS at 06:13

## 2023-08-30 RX ADMIN — COLCHICINE 0.6 MG: 0.6 TABLET ORAL at 06:12

## 2023-08-30 RX ADMIN — GABAPENTIN 600 MG: 300 CAPSULE ORAL at 06:13

## 2023-08-30 RX ADMIN — ASPIRIN 975 MG: 325 TABLET, COATED ORAL at 06:12

## 2023-08-30 RX ADMIN — ACETAMINOPHEN 975 MG: 325 TABLET, FILM COATED ORAL at 13:07

## 2023-08-30 RX ADMIN — HYDROMORPHONE HYDROCHLORIDE 8 MG: 2 TABLET ORAL at 20:30

## 2023-08-30 RX ADMIN — ACETAMINOPHEN 975 MG: 325 TABLET, FILM COATED ORAL at 06:12

## 2023-08-30 RX ADMIN — HYDROMORPHONE HYDROCHLORIDE 8 MG: 2 TABLET ORAL at 07:38

## 2023-08-30 NOTE — ASSESSMENT & PLAN NOTE
• Chronic pain syndrome  • Was on fentanyl pump, switch to morphine, pump will activate in 4 days morphine administration thru intrathecal pain pump  • We will continue on oral Dilaudid and IV Dilaudid

## 2023-08-30 NOTE — PROGRESS NOTES
[address] [facility]  Progress Note  Name: Cale Gaspar  MRN: [id number]  Unit/Bed#: [facility] 546-08 I Date of Admission: 8/23/2023   Date of Service: 8/30/2023 I Hospital Day: 7    Assessment/Plan   * Chest pain  Assessment & Plan  · Chest pain secondary to myopericarditis  · Patient presented with recurrent chest pain. Recent discharge from [facility] on 8/21/23  with Recent cardiac catheterization that did not reveal any critical lesions and medical management was recommended. · Patient was evaluated by cardiology at [address] [place] [address]. Per cardiology EKG with some anterior ST depressions on the initial ECG which resolved on subsequent EKGs. His troponins are mildly elevated and flat without significant change. Cardiology recommended transfer to [facility] for cardiac MRI/ further management to assess for myopericarditis   · Continue colchicine, patient states he developed nausea a few days ago from it but he is willing to try again.   Discontinue ibuprofen and start high-dose aspirin taper over 3 weeks  · No Plavix until on high-dose aspirin  · Continue PPI twice daily  · Cardiac MRI pending    History of gastric sleeve procedure  Assessment & Plan  · History of gastric sleeve procedure noted    Major depressive disorder, recurrent, moderate ([address])  Assessment & Plan  · He is not on Abilify, it was discontinued    Type 2 diabetes mellitus with diabetic neuropathy, with long-term current use of insulin [facility])  Assessment & Plan  Lab Results   Component Value Date    HGBA1C 5.3 07/14/2022       Recent Labs     08/29/23  2041 08/30/23  0612 08/30/23  1044 08/30/23  1544   POCGLU 125 90 118 87       Blood Sugar Average: Last 72 hrs:  (P) 102.4   Not on  meds at home  Monitor    Opioid dependence, continuous (HCC)  Assessment & Plan  • Chronic pain syndrome  • Was on fentanyl pump, switch to morphine, pump will activate in 4 days morphine administration thru intrathecal pain pump  • We will continue on oral Dilaudid and IV Dilaudid      GERD (gastroesophageal reflux disease)  Assessment & Plan  · Continue PPI    CVA (cerebral vascular accident) ([address])  Assessment & Plan  · History of CVA in   · Continue aspirin and statin    Morbid obesity ([address])  Assessment & Plan  · With history of prior gastric sleeve           VTE Pharmacologic Prophylaxis: VTE Score: 6 High Risk (Score >/= 5) - Pharmacological DVT Prophylaxis Ordered: enoxaparin (Lovenox). Sequential Compression Devices Ordered. Patient Centered Rounds: I performed bedside rounds with nursing staff today. Discussions with Specialists or Other Care Team Provider: Nursing, case management, cardiology    Education and Discussions with Family / Patient: Updated  (wife) at bedside. Total Time Spent on Date of Encounter in care of patient: 35 minutes This time was spent on one or more of the following: performing physical exam; counseling and coordination of care; obtaining or reviewing history; documenting in the medical record; reviewing/ordering tests, medications or procedures; communicating with other healthcare professionals and discussing with patient's family/caregivers. Current Length of Stay: 7 day(s)  Current Patient Status: Inpatient   Certification Statement: The patient will continue to require additional inpatient hospital stay due to Cardiac MRI  Discharge Plan: Anticipate discharge in 48 hrs to home. Code Status: Level 1 - Full Code    Subjective:   Patient was seen evaluated bedside. Intermittently nauseous    Objective:     Vitals:   Temp (24hrs), Av °F (36.7 °C), Min:97.3 °F (36.3 °C), Max:98.4 °F (36.9 °C)    Temp:  [97.3 °F (36.3 °C)-98.4 °F (36.9 °C)] 98.4 °F (36.9 °C)  HR:  [53-65] 62  Resp:  [18] 18  BP: (121-143)/(64-95) 143/95  SpO2:  [92 %-96 %] 93 %  Body mass index is 40.82 kg/m². Input and Output Summary (last 24 hours):      Intake/Output Summary (Last 24 hours) at 8/30/2023 1803  Last data filed at 8/30/2023 1101  Gross per 24 hour   Intake --   Output 250 ml   Net -250 ml       Physical Exam:   Physical Exam  Constitutional:       General: He is not in acute distress. Appearance: He is obese. Cardiovascular:      Rate and Rhythm: Normal rate and regular rhythm. Heart sounds: No murmur heard. No friction rub. No gallop. Pulmonary:      Effort: Pulmonary effort is normal. No respiratory distress. Breath sounds: Normal breath sounds. No wheezing. Abdominal:      General: Bowel sounds are normal. There is no distension. Palpations: Abdomen is soft. Tenderness: There is no abdominal tenderness. Musculoskeletal:         General: No swelling. Skin:     General: Skin is warm and dry. Neurological:      General: No focal deficit present. Mental Status: He is alert. Psychiatric:         Mood and Affect: Mood normal.        Additional Data:     Labs:  Results from last 7 days   Lab Units 08/30/23  0506 08/29/23  0450 08/28/23  0504   WBC Thousand/uL 8.59   < > 7.90   HEMOGLOBIN g/dL 12.2   < > 12.3   HEMATOCRIT % 38.6   < > 39.2   PLATELETS Thousands/uL 204   < > 202   NEUTROS PCT %  --   --  66   LYMPHS PCT %  --   --  21   MONOS PCT %  --   --  7   EOS PCT %  --   --  4    < > = values in this interval not displayed. Results from last 7 days   Lab Units 08/30/23  0506 08/29/23  0450 08/28/23  0504   SODIUM mmol/L 136   < > 140   POTASSIUM mmol/L 3.8   < > 3.7   CHLORIDE mmol/L 106   < > 111*   CO2 mmol/L 22   < > 23   BUN mg/dL 12   < > 13   CREATININE mg/dL 0.96   < > 0.93   ANION GAP mmol/L 8   < > 6   CALCIUM mg/dL 8.1*   < > 7.8*   ALBUMIN g/dL  --   --  3.1*   TOTAL BILIRUBIN mg/dL  --   --  0.30   ALK PHOS U/L  --   --  48   ALT U/L  --   --  15   AST U/L  --   --  13   GLUCOSE RANDOM mg/dL 93   < > 78    < > = values in this interval not displayed.          Results from last 7 days   Lab Units 08/30/23  1544 08/30/23  1044 08/30/23  0612 08/29/23  2041 08/29/23  1638 08/25/23  1107 08/24/23  1049 08/24/23  0546 08/23/23  2316   POC GLUCOSE mg/dl 87 118 90 125 92 121 106 117 113               Lines/Drains:  Invasive Devices     Peripheral Intravenous Line  Duration           Peripheral IV 08/29/23 Dorsal (posterior); Left Forearm 1 day                  Telemetry:  Telemetry Orders (From admission, onward)             24 Hour Telemetry Monitoring  Continuous x 24 Hours (Telem)        Question:  Reason for 24 Hour Telemetry  Answer:  PCI/EP study (including pacer and ICD implementation), Cardiac surgery, MI, abnormal cardiac cath, and chest pain- rule out MI                 Telemetry Reviewed: Normal Sinus Rhythm  Indication for Continued Telemetry Use: Stalin/shae/endocarditis             Imaging: Reviewed radiology reports from this admission including: chest xray    Recent Cultures (last 7 days):         Last 24 Hours Medication List:   Current Facility-Administered Medications   Medication Dose Route Frequency Provider Last Rate   • acetaminophen  975 mg Oral Q8H [address] Samy Myles DO     • amLODIPine  5 mg Oral Daily Garrison Raymond PA-C     • aspirin  975 mg Oral Q8H Jones Taylor MD     • atorvastatin  80 mg Oral After Eriberto Muniz, DO     • baclofen  10 mg Oral TID Garrison Raymond PA-C     • bisacodyl  10 mg Rectal Daily PRN Garrison Raymond PA-C     • carvedilol  12.5 mg Oral BID With Meals Garrison Raymond PA-C     • colchicine  0.6 mg Oral BID Garrison Raymond PA-C     • enoxaparin  40 mg Subcutaneous Daily Garrison Raymond PA-C     • folic acid  1 mg Oral Daily Garrison JANNA Raymond     • gabapentin  600 mg Oral TID Garrison Raymond PA-C     • HYDROmorphone  1.2 mg Intravenous Q3H PRN Samy Yvetteai, DO     • HYDROmorphone  6 mg Oral Q4H PRN Samy Yvetteai, DO      Or   • HYDROmorphone  8 mg Oral Q4H PRN Samy Myles, DO     • insulin lispro  1-5 Units Subcutaneous TID JO Mireles, DO     • isosorbide mononitrate  60 mg Oral Daily Maryan Wheat PA-C     • LORazepam  0.5 mg Oral Once PRN Sandra Richards PA-C     • methocarbamol  750 mg Oral Q6H PRN Jocelyne Taylor, DO     • nitroglycerin  0.4 mg Sublingual Q5 Min PRN Jocelyne Taylor, DO     • ondansetron  4 mg Intravenous Q6H PRN Jocelyne Taylor, DO     • pantoprazole  40 mg Oral BID AC Frida Lemus MD     • ranolazine  1,000 mg Oral Q12H [address] Maryan Wheat PA-C     • senna-docusate sodium  1 tablet Oral BID Maryan Wheat PA-C     • tamsulosin  0.4 mg Oral Daily With Arnoldo Rader DO          Today, Patient Was Seen By: Josiah Brito MD    **Please Note: This note may have been constructed using a voice recognition system. **

## 2023-08-30 NOTE — ASSESSMENT & PLAN NOTE
Lab Results   Component Value Date    HGBA1C 5.3 07/14/2022       Recent Labs     08/29/23  2041 08/30/23  0612 08/30/23  1044 08/30/23  1544   POCGLU 125 90 118 87       Blood Sugar Average: Last 72 hrs:  (P) 102.4   Not on  meds at home  Monitor

## 2023-08-30 NOTE — ASSESSMENT & PLAN NOTE
· Chest pain secondary to myopericarditis  · Patient presented with recurrent chest pain. Recent discharge from [facility] on 8/21/23  with Recent cardiac catheterization that did not reveal any critical lesions and medical management was recommended. · Patient was evaluated by cardiology at [address] [place] [address]. Per cardiology EKG with some anterior ST depressions on the initial ECG which resolved on subsequent EKGs. His troponins are mildly elevated and flat without significant change. Cardiology recommended transfer to [facility] for cardiac MRI/ further management to assess for myopericarditis   · Continue colchicine, patient states he developed nausea a few days ago from it but he is willing to try again.   Discontinue ibuprofen and start high-dose aspirin taper over 3 weeks  · No Plavix until on high-dose aspirin  · Continue PPI twice daily  · Cardiac MRI pending

## 2023-08-30 NOTE — PROGRESS NOTES
Cardiology Team 2 Progress Note - Wilman Rivera 63 y.o. male MRN: [id number]    Unit/Bed#: [facility] 522-01 Encounter: [id number]          Subjective:   Patient seen and examined. No significant events overnight. He endorses continued and unchanging chest pain and nausea but has not received pain medication which is due soon, so he recognizes that his pain will be much better controlled soon. He is aware that people from MRI will be coming to talk to him today about the risks and benefits and confirm his situation with the pain pump and brain coils, with MRI scheduled for tomorrow afternoon. He clearly states that he is leaving on Friday no matter what unless he is "desperately ill" because he wants to spend Labor Day with his kids. Hospital Course:   Wilman Rivera is a 63y.o. year old male with a history of history of CAD with multiple stent placements on DAPT and diffuse pain with pain pump. After recent admission with elevated troponin and cath which revealed patent stents, he came back with continued chest pain suspicious for myopericarditis. He has responded well to ibuprofen but feels nauseous from colchicine. Echo shows no effusion at this time. Vitals: Blood pressure 136/64, pulse 57, temperature 98.4 °F (36.9 °C), resp. rate 18, height 6' (1.829 m), weight (!) 137 kg (301 lb), SpO2 94 %. , Body mass index is 40.82 kg/m².,   Orthostatic Blood Pressures    Flowsheet Row Most Recent Value   Blood Pressure 136/64 filed at 08/30/2023 0705   Patient Position - Orthostatic VS Lying filed at 08/30/2023 0615            Intake/Output Summary (Last 24 hours) at 8/30/2023 0934  Last data filed at 8/30/2023 0601  Gross per 24 hour   Intake 0 ml   Output 400 ml   Net -400 ml         Physical Exam:    GEN: Wilman Rivera appears well, alert and oriented x 3, pleasant and cooperative   HEENT:  Normocephalic, atraumatic  HEART: Regular rate and rhythm, normal S1 and S2, no murmurs, clicks, gallops or rubs   LUNGS: Clear to auscultation bilaterally; no wheezes, rales, or rhonchi; respiration nonlabored   ABDOMEN:  Normoactive bowel sounds, soft, no tenderness, no distention  EXTREMITIES: peripheral pulses palpable; no edema  NEURO: no focal deficits  SKIN:  Dry, intact, warm to touch      Current Facility-Administered Medications:   •  acetaminophen (TYLENOL) tablet 975 mg, 975 mg, Oral, Q8H [facility] & long-term, Samy Myles DO, 975 mg at 08/30/23 0612  •  amLODIPine (NORVASC) tablet 5 mg, 5 mg, Oral, Daily, Terhandy Craven PA-C, 5 mg at 08/30/23 6632  •  aspirin (ECOTRIN) EC tablet 975 mg, 975 mg, Oral, Q8H, Jones Taylor MD, 975 mg at 08/30/23 2977  •  atorvastatin (LIPITOR) tablet 80 mg, 80 mg, Oral, After Vinicius Lezama DO, 80 mg at 08/29/23 1758  •  baclofen tablet 10 mg, 10 mg, Oral, TID, ANTHONY Carlos-C, 10 mg at 08/30/23 7423  •  bisacodyl (DULCOLAX) rectal suppository 10 mg, 10 mg, Rectal, Daily PRN, ANTHONY Carlos-C  •  carvedilol (COREG) tablet 12.5 mg, 12.5 mg, Oral, BID With Meals, Terhandy Craven PA-C, 12.5 mg at 08/30/23 2892  •  colchicine (COLCRYS) tablet 0.6 mg, 0.6 mg, Oral, BID, Terhandy Craven PA-C, 0.6 mg at 08/30/23 7920  •  enoxaparin (LOVENOX) subcutaneous injection 40 mg, 40 mg, Subcutaneous, Daily, ANTHONY Carlos-C, 40 mg at 92/29/60 5982  •  folic acid (FOLVITE) tablet 1 mg, 1 mg, Oral, Daily, Terhandy Craven PA-C, 1 mg at 08/30/23 1538  •  gabapentin (NEURONTIN) capsule 600 mg, 600 mg, Oral, TID, ANTHONY Carlos-C, 600 mg at 08/30/23 2810  •  HYDROmorphone (DILAUDID) injection 1.2 mg, 1.2 mg, Intravenous, Q3H PRN, Samy Myles DO, 1.2 mg at 08/29/23 1810  •  HYDROmorphone (DILAUDID) tablet 6 mg, 6 mg, Oral, Q4H PRN **OR** HYDROmorphone (DILAUDID) tablet 8 mg, 8 mg, Oral, Q4H PRN, Samy Myles DO, 8 mg at 08/30/23 0738  •  insulin lispro (HumaLOG) 100 units/mL subcutaneous injection 1-5 Units, 1-5 Units, Subcutaneous, TID AC **AND** Fingerstick Glucose (POCT), , , TID AC, Dillon Michel DO  •  isosorbide mononitrate (IMDUR) 24 hr tablet 60 mg, 60 mg, Oral, Daily, Lore Holt PA-C, 60 mg at 08/30/23 0895  •  LORazepam (ATIVAN) tablet 0.5 mg, 0.5 mg, Oral, Once PRN, Marti Mccollum PA-C  •  methocarbamol (ROBAXIN) tablet 750 mg, 750 mg, Oral, Q6H PRN, Dillon Michel DO  •  nitroglycerin (NITROSTAT) SL tablet 0.4 mg, 0.4 mg, Sublingual, Q5 Min PRN, Dillon Michel DO, 0.4 mg at 08/24/23 1159  •  ondansetron (ZOFRAN) injection 4 mg, 4 mg, Intravenous, Q6H PRN, Dillon Michel DO, 4 mg at 08/29/23 1427  •  pantoprazole (PROTONIX) EC tablet 40 mg, 40 mg, Oral, BID AC, Frida Lemus MD  •  ranolazine (RANEXA) 12 hr tablet 1,000 mg, 1,000 mg, Oral, Q12H [address], Lore Holt PA-C, 1,000 mg at 08/30/23 6133  •  senna-docusate sodium (SENOKOT S) 8.6-50 mg per tablet 1 tablet, 1 tablet, Oral, BID, Lore Holt PA-C, 1 tablet at 08/30/23 3285  •  tamsulosin (FLOMAX) capsule 0.4 mg, 0.4 mg, Oral, Daily With Luisa Catherine DO, 0.4 mg at 08/29/23 1758    Labs & Results:          Results from last 7 days   Lab Units 08/30/23  0506 08/29/23  0450 08/28/23  0504   POTASSIUM mmol/L 3.8 3.8 3.7   CO2 mmol/L 22 22 23   CHLORIDE mmol/L 106 111* 111*   BUN mg/dL 12 13 13   CREATININE mg/dL 0.96 0.94 0.93     Results from last 7 days   Lab Units 08/30/23  0506 08/29/23  0450 08/28/23  0504   HEMOGLOBIN g/dL 12.2 12.1 12.3   HEMATOCRIT % 38.6 37.8 39.2   PLATELETS Thousands/uL 204 200 202           Telemetry:   Personally reviewed by Moisés Vela MD: No events on telemetry, averaging 50 to 60 bpm with regular rhythm    Imaging: No new imaging to review at this time. Cardiac MRI scheduled for 8/31 at 3:45 PM      VTE Prophylaxis: Sequential compression device (Venodyne)  and Enoxaparin (Lovenox)        Assessment:  Principal Problem:    Chest pain  Active Problems:     Morbid obesity ([address])    CVA (cerebral vascular accident) ([address])    GERD (gastroesophageal reflux disease)    Opioid dependence, continuous ([address])    Type 2 diabetes mellitus with diabetic neuropathy, with long-term current use of insulin (HCC)    Major depressive disorder, recurrent, moderate (HCC)    History of gastric sleeve procedure        1. Myopericarditis. Patient is still experiencing chest pain and nausea on colchicine and aspirin. Will reevaluate medication regimen based on results of cardiac MRI obtained tomorrow as patient would not benefit from anti-inflammatory therapy if MRI determines there is no pericardial involvement. Plan:  1. Continue colchicine, high-dose aspirin 3 times daily with scheduled taper over 3 weeks. Will discontinue anti-inflammatories if MRI is negative for pericardial inflammation and reevaluate how to manage patient's pain. Case discussed and reviewed with Dr. Concepción Everett and is pending their agreement with the assessment and plan. Thank you for involving us in the care of your patient. [employer]Care Everywhere records reviewed: yes    ** Please Note: Fluency DirectDictation voice to text software may have been used in the creation of this document.  **

## 2023-08-31 ENCOUNTER — APPOINTMENT (OUTPATIENT)
Dept: RADIOLOGY | Facility: HOSPITAL | Age: 63
DRG: 281 | End: 2023-08-31
Payer: MEDICARE

## 2023-08-31 VITALS
BODY MASS INDEX: 40.77 KG/M2 | WEIGHT: 301 LBS | HEIGHT: 72 IN | OXYGEN SATURATION: 93 % | DIASTOLIC BLOOD PRESSURE: 67 MMHG | RESPIRATION RATE: 22 BRPM | HEART RATE: 70 BPM | SYSTOLIC BLOOD PRESSURE: 129 MMHG | TEMPERATURE: 98.5 F

## 2023-08-31 LAB
ANION GAP SERPL CALCULATED.3IONS-SCNC: 9 MMOL/L
BUN SERPL-MCNC: 12 MG/DL (ref 5–25)
CALCIUM SERPL-MCNC: 7.8 MG/DL (ref 8.4–10.2)
CHLORIDE SERPL-SCNC: 108 MMOL/L (ref 96–108)
CO2 SERPL-SCNC: 21 MMOL/L (ref 21–32)
CREAT SERPL-MCNC: 0.94 MG/DL (ref 0.6–1.3)
ERYTHROCYTE [DISTWIDTH] IN BLOOD BY AUTOMATED COUNT: 13.4 % (ref 11.6–15.1)
GFR SERPL CREATININE-BSD FRML MDRD: 87 ML/MIN/1.73SQ M
GLUCOSE SERPL-MCNC: 124 MG/DL (ref 65–140)
GLUCOSE SERPL-MCNC: 130 MG/DL (ref 65–140)
GLUCOSE SERPL-MCNC: 88 MG/DL (ref 65–140)
GLUCOSE SERPL-MCNC: 91 MG/DL (ref 65–140)
HCT VFR BLD AUTO: 39.5 % (ref 36.5–49.3)
HGB BLD-MCNC: 12.9 G/DL (ref 12–17)
MCH RBC QN AUTO: 30.8 PG (ref 26.8–34.3)
MCHC RBC AUTO-ENTMCNC: 32.7 G/DL (ref 31.4–37.4)
MCV RBC AUTO: 94 FL (ref 82–98)
PLATELET # BLD AUTO: 218 THOUSANDS/UL (ref 149–390)
PMV BLD AUTO: 10.4 FL (ref 8.9–12.7)
POTASSIUM SERPL-SCNC: 3.9 MMOL/L (ref 3.5–5.3)
RBC # BLD AUTO: 4.19 MILLION/UL (ref 3.88–5.62)
SODIUM SERPL-SCNC: 138 MMOL/L (ref 135–147)
WBC # BLD AUTO: 10.91 THOUSAND/UL (ref 4.31–10.16)

## 2023-08-31 PROCEDURE — 82948 REAGENT STRIP/BLOOD GLUCOSE: CPT

## 2023-08-31 PROCEDURE — 99239 HOSP IP/OBS DSCHRG MGMT >30: CPT | Performed by: INTERNAL MEDICINE

## 2023-08-31 PROCEDURE — 99232 SBSQ HOSP IP/OBS MODERATE 35: CPT | Performed by: INTERNAL MEDICINE

## 2023-08-31 PROCEDURE — 80048 BASIC METABOLIC PNL TOTAL CA: CPT | Performed by: INTERNAL MEDICINE

## 2023-08-31 PROCEDURE — 85027 COMPLETE CBC AUTOMATED: CPT | Performed by: INTERNAL MEDICINE

## 2023-08-31 PROCEDURE — A9585 GADOBUTROL INJECTION: HCPCS | Performed by: INTERNAL MEDICINE

## 2023-08-31 PROCEDURE — G1004 CDSM NDSC: HCPCS

## 2023-08-31 PROCEDURE — 75561 CARDIAC MRI FOR MORPH W/DYE: CPT

## 2023-08-31 RX ORDER — ASPIRIN 81 MG/1
81 TABLET, CHEWABLE ORAL DAILY
Status: DISCONTINUED | OUTPATIENT
Start: 2023-09-01 | End: 2023-08-31 | Stop reason: HOSPADM

## 2023-08-31 RX ORDER — DOCUSATE SODIUM 100 MG/1
100 CAPSULE, LIQUID FILLED ORAL 2 TIMES DAILY
Status: DISCONTINUED | OUTPATIENT
Start: 2023-08-31 | End: 2023-08-31

## 2023-08-31 RX ORDER — GADOBUTROL 604.72 MG/ML
26 INJECTION INTRAVENOUS
Status: COMPLETED | OUTPATIENT
Start: 2023-08-31 | End: 2023-08-31

## 2023-08-31 RX ORDER — LORAZEPAM 2 MG/ML
0.5 INJECTION INTRAMUSCULAR ONCE
Status: COMPLETED | OUTPATIENT
Start: 2023-08-31 | End: 2023-08-31

## 2023-08-31 RX ADMIN — RANOLAZINE 1000 MG: 500 TABLET, FILM COATED, EXTENDED RELEASE ORAL at 17:02

## 2023-08-31 RX ADMIN — ENOXAPARIN SODIUM 40 MG: 40 INJECTION SUBCUTANEOUS at 05:22

## 2023-08-31 RX ADMIN — PANTOPRAZOLE SODIUM 40 MG: 40 TABLET, DELAYED RELEASE ORAL at 06:03

## 2023-08-31 RX ADMIN — ACETAMINOPHEN 975 MG: 325 TABLET, FILM COATED ORAL at 05:19

## 2023-08-31 RX ADMIN — GABAPENTIN 600 MG: 300 CAPSULE ORAL at 16:53

## 2023-08-31 RX ADMIN — HYDROMORPHONE HYDROCHLORIDE 8 MG: 2 TABLET ORAL at 16:52

## 2023-08-31 RX ADMIN — ASPIRIN 975 MG: 325 TABLET, COATED ORAL at 05:18

## 2023-08-31 RX ADMIN — CARVEDILOL 12.5 MG: 12.5 TABLET, FILM COATED ORAL at 17:02

## 2023-08-31 RX ADMIN — METHOCARBAMOL 750 MG: 750 TABLET ORAL at 12:57

## 2023-08-31 RX ADMIN — DOCUSATE SODIUM 100 MG: 100 CAPSULE, LIQUID FILLED ORAL at 10:46

## 2023-08-31 RX ADMIN — TAMSULOSIN HYDROCHLORIDE 0.4 MG: 0.4 CAPSULE ORAL at 16:54

## 2023-08-31 RX ADMIN — BACLOFEN 10 MG: 10 TABLET ORAL at 16:54

## 2023-08-31 RX ADMIN — ACETAMINOPHEN 975 MG: 325 TABLET, FILM COATED ORAL at 16:55

## 2023-08-31 RX ADMIN — CARVEDILOL 12.5 MG: 12.5 TABLET, FILM COATED ORAL at 05:19

## 2023-08-31 RX ADMIN — RANOLAZINE 1000 MG: 500 TABLET, FILM COATED, EXTENDED RELEASE ORAL at 05:19

## 2023-08-31 RX ADMIN — FOLIC ACID 1 MG: 1 TABLET ORAL at 05:20

## 2023-08-31 RX ADMIN — HYDROMORPHONE HYDROCHLORIDE 8 MG: 2 TABLET ORAL at 03:48

## 2023-08-31 RX ADMIN — ATORVASTATIN CALCIUM 80 MG: 80 TABLET, FILM COATED ORAL at 17:02

## 2023-08-31 RX ADMIN — HYDROMORPHONE HYDROCHLORIDE 8 MG: 2 TABLET ORAL at 10:46

## 2023-08-31 RX ADMIN — SENNOSIDES AND DOCUSATE SODIUM 1 TABLET: 50; 8.6 TABLET ORAL at 05:20

## 2023-08-31 RX ADMIN — LORAZEPAM 0.5 MG: 2 INJECTION INTRAMUSCULAR; INTRAVENOUS at 13:16

## 2023-08-31 RX ADMIN — PANTOPRAZOLE SODIUM 40 MG: 40 TABLET, DELAYED RELEASE ORAL at 16:53

## 2023-08-31 RX ADMIN — SENNOSIDES AND DOCUSATE SODIUM 1 TABLET: 50; 8.6 TABLET ORAL at 17:02

## 2023-08-31 RX ADMIN — ISOSORBIDE MONONITRATE 60 MG: 60 TABLET, EXTENDED RELEASE ORAL at 05:18

## 2023-08-31 RX ADMIN — COLCHICINE 0.6 MG: 0.6 TABLET ORAL at 05:18

## 2023-08-31 RX ADMIN — GABAPENTIN 600 MG: 300 CAPSULE ORAL at 05:20

## 2023-08-31 RX ADMIN — BACLOFEN 10 MG: 10 TABLET ORAL at 05:19

## 2023-08-31 RX ADMIN — AMLODIPINE BESYLATE 5 MG: 5 TABLET ORAL at 05:20

## 2023-08-31 RX ADMIN — GADOBUTROL 26 ML: 604.72 INJECTION INTRAVENOUS at 14:02

## 2023-08-31 NOTE — PROGRESS NOTES
Cardiology Team 2 Progress Note - Tansiha Cole 63 y.o. male MRN: [id number]    Unit/Bed#: [facility] 522-01 Encounter: [id number]    Subjective:   Patient seen and examined. He is happy cardiac MRI has been completed and looks forward to spending Labor day with his kids. Hospital Course:   Tanisha Cole is a 63y.o. year old male with a history of CAD with multiple stent placements on DAPT and diffuse pain with pain pump. After recent admission with elevated troponin and cath which revealed patent stents, he came back with continued chest pain suspicious for myopericarditis. He has responded well to ibuprofen but feels nauseous from colchicine. Echo shows no effusion at this time. Vitals: Blood pressure 125/76, pulse 65, temperature 98.2 °F (36.8 °C), resp. rate 18, height 6' (1.829 m), weight (!) 137 kg (301 lb), SpO2 94 %. , Body mass index is 40.82 kg/m².,   Orthostatic Blood Pressures    Flowsheet Row Most Recent Value   Blood Pressure 125/76 filed at 08/31/2023 1052   Patient Position - Orthostatic VS Sitting filed at 08/31/2023 0617            Intake/Output Summary (Last 24 hours) at 8/31/2023 1528  Last data filed at 8/31/2023 1342  Gross per 24 hour   Intake 500 ml   Output 450 ml   Net 50 ml         Physical Exam:    GEN: Tanisha Cole appears well, alert and oriented x 3, pleasant and cooperative, laying comfortably in hospital bed not complaining of pain at present  HEENT:  Normocephalic, atraumatic  LUNGS: respiration nonlabored   NEURO: no focal deficits      Current Facility-Administered Medications:   •  acetaminophen (TYLENOL) tablet 975 mg, 975 mg, Oral, Q8H [address], Samy Banai, DO, 975 mg at 08/31/23 4852  •  amLODIPine (NORVASC) tablet 5 mg, 5 mg, Oral, Daily, Sarah Estes PA-C, 5 mg at 08/31/23 0992  •  [START ON 9/1/2023] aspirin chewable tablet 81 mg, 81 mg, Oral, Daily, Hilaria Hernandez MD  •  atorvastatin (LIPITOR) tablet 80 mg, 80 mg, Oral, After Dinner, Minal Massey DO, 80 mg at 08/30/23 1740  •  baclofen tablet 10 mg, 10 mg, Oral, TID, Khai Rodrigez PA-C, 10 mg at 08/31/23 2382  •  carvedilol (COREG) tablet 12.5 mg, 12.5 mg, Oral, BID With Meals, Khai Rodrigez PA-C, 12.5 mg at 08/31/23 0513  •  enoxaparin (LOVENOX) subcutaneous injection 40 mg, 40 mg, Subcutaneous, Daily, Khai Rodrigez PA-C, 40 mg at 21/03/63 1079  •  folic acid (FOLVITE) tablet 1 mg, 1 mg, Oral, Daily, Khai Rodrigez PA-C, 1 mg at 08/31/23 9135  •  gabapentin (NEURONTIN) capsule 600 mg, 600 mg, Oral, TID, Khai Rodrigez PA-C, 600 mg at 08/31/23 5961  •  HYDROmorphone (DILAUDID) injection 1.2 mg, 1.2 mg, Intravenous, Q3H PRN, Samy Myles DO, 1.2 mg at 08/29/23 1810  •  HYDROmorphone (DILAUDID) tablet 6 mg, 6 mg, Oral, Q4H PRN **OR** HYDROmorphone (DILAUDID) tablet 8 mg, 8 mg, Oral, Q4H PRN, Samy Myles DO, 8 mg at 08/31/23 1046  •  insulin lispro (HumaLOG) 100 units/mL subcutaneous injection 1-5 Units, 1-5 Units, Subcutaneous, TID AC **AND** Fingerstick Glucose (POCT), , , TID AC, Minal Massey DO  •  isosorbide mononitrate (IMDUR) 24 hr tablet 60 mg, 60 mg, Oral, Daily, Khai Rodrigez PA-C, 60 mg at 08/31/23 0518  •  methocarbamol (ROBAXIN) tablet 750 mg, 750 mg, Oral, Q6H PRN, Minal Massey DO, 750 mg at 08/31/23 1257  •  nitroglycerin (NITROSTAT) SL tablet 0.4 mg, 0.4 mg, Sublingual, Q5 Min PRN, Minal Massey DO, 0.4 mg at 08/24/23 1159  •  ondansetron (ZOFRAN) injection 4 mg, 4 mg, Intravenous, Q6H PRN, Minal Massey DO, 4 mg at 08/30/23 1629  •  pantoprazole (PROTONIX) EC tablet 40 mg, 40 mg, Oral, BID ACFrida MD, 40 mg at 08/31/23 0603  •  ranolazine (RANEXA) 12 hr tablet 1,000 mg, 1,000 mg, Oral, Q12H [address], Khai Rodrigez PA-C, 1,000 mg at 08/31/23 6671  •  senna-docusate sodium (SENOKOT S) 8.6-50 mg per tablet 1 tablet, 1 tablet, Oral, BID, Khai Rodrigez PA-C, 1 tablet at 08/31/23 4249  •  tamsulosin (FLOMAX) capsule 0.4 mg, 0.4 mg, Oral, Daily With Jose Saha DO, 0.4 mg at 08/30/23 1553    Labs & Results:          Results from last 7 days   Lab Units 08/31/23  0432 08/30/23  0506 08/29/23  0450   POTASSIUM mmol/L 3.9 3.8 3.8   CO2 mmol/L 21 22 22   CHLORIDE mmol/L 108 106 111*   BUN mg/dL 12 12 13   CREATININE mg/dL 0.94 0.96 0.94     Results from last 7 days   Lab Units 08/31/23  0432 08/30/23  0506 08/29/23  0450   HEMOGLOBIN g/dL 12.9 12.2 12.1   HEMATOCRIT % 39.5 38.6 37.8   PLATELETS Thousands/uL 218 204 200           Telemetry:   Personally reviewed by Josephine Mahajan MD: Normal sinus rhythm averaging around 65 to 70 bpm    Imaging:   Cardiac MRI with and without contrast 8/31  1. Normal left and right ventricular size and systolic function. 2. No significant valvular abnormalities. 3. No obvious evidence of pericarinal inflammation. 4. Patchy intramyocardial fibrosis/edema involving the basal anteroseptal and basal inferior walls. This is highly suggestive of acute myocarditis. VTE Prophylaxis: Sequential compression device (Venodyne)  and Enoxaparin (Lovenox)        Assessment:  Principal Problem:    Chest pain  Active Problems: Morbid obesity ([address])    CVA (cerebral vascular accident) ([address])    GERD (gastroesophageal reflux disease)    Opioid dependence, continuous ([address])    Type 2 diabetes mellitus with diabetic neuropathy, with long-term current use of insulin ([facility])    Major depressive disorder, recurrent, moderate ([address])    History of gastric sleeve procedure        1. Myocarditis. Based on MRI findings, patient does not have any evidence of pericarditis just myocarditis. He will not gain any benefit from anti-inflammatory therapy. Plan:  1. Discontinue colchicine. Change aspirin from 975 3 times daily to 81 once daily. He does not require inpatient management for myocarditis.     Cardiology will sign off at this time      Case discussed and reviewed with Dr. Tima Arteaga and is pending their agreement with the assessment and plan. Thank you for involving us in the care of your patient. Epic/ [employer]sc[employer] Only/Care Everywhere records reviewed: yes    ** Please Note: Fluency DirectDictation voice to text software may have been used in the creation of this document.  **

## 2023-08-31 NOTE — ASSESSMENT & PLAN NOTE
· Patient presented with recurrent chest pain. Recent discharge from [address] [place] [address] on 8/21/23  with Recent cardiac catheterization that did not reveal any critical lesions and medical management was recommended. · Presented with chest pain. Evaluated by cardiology at [facility]. Per cardiology EKG with some anterior ST depressions on the initial ECG which resolved on subsequent EKGs. His troponins are mildly elevated and flat without significant change. Cardiology recommended transfer to [address] [place] [address] for cardiac MRI/ further management to assess for myopericarditis   · Started on colchicine and high-dose aspirin  · Underwent cardiac MRI which showed patchy intramyocardial fibrosis/edema suggestive of acute myocarditis.   No evidence of pericardial inflammation  · No need for high-dose aspirin or colchicine, it was discontinued  · Continue aspirin 81 mg daily, Plavix 75 mg daily  · No exercise for 12 weeks  · Repeat echo as outpatient in 8 to 12 weeks  · Continue PPI

## 2023-08-31 NOTE — ASSESSMENT & PLAN NOTE
• Chronic pain syndrome  • Was on fentanyl pump, switched to morphine, pump will activate in 3 days morphine administration thru intrathecal pain pump

## 2023-08-31 NOTE — ASSESSMENT & PLAN NOTE
Lab Results   Component Value Date    HGBA1C 5.3 07/14/2022       Recent Labs     08/30/23  2114 08/31/23  0616 08/31/23  1050 08/31/23  1604   POCGLU 96 91 130 124       Blood Sugar Average: Last 72 hrs:  (P) 488.3763887497137822   Not on  meds at home  Monitor

## 2023-08-31 NOTE — DISCHARGE SUMMARY
[address] [facility]  Discharge- Orien Poster 1960, 63 y.o. male MRN: [id number]  [facility] Encounter: [id number]  Primary Care Provider: Mary Newman MD   Date and time admitted to hospital: 8/23/2023 10:00 PM    * Chest pain  Assessment & Plan  · Patient presented with recurrent chest pain. Recent discharge from [address] [place] [address] on 8/21/23  with Recent cardiac catheterization that did not reveal any critical lesions and medical management was recommended. · Presented with chest pain. Evaluated by cardiology at [facility]. Per cardiology EKG with some anterior ST depressions on the initial ECG which resolved on subsequent EKGs. His troponins are mildly elevated and flat without significant change. Cardiology recommended transfer to [address] [place] [address] for cardiac MRI/ further management to assess for myopericarditis   · Started on colchicine and high-dose aspirin  · Underwent cardiac MRI which showed patchy intramyocardial fibrosis/edema suggestive of acute myocarditis.   No evidence of pericardial inflammation  · No need for high-dose aspirin or colchicine, it was discontinued  · Continue aspirin 81 mg daily, Plavix 75 mg daily  · No exercise for 12 weeks  · Repeat echo as outpatient in 8 to 12 weeks  · Continue PPI    History of gastric sleeve procedure  Assessment & Plan  · History of gastric sleeve procedure noted    Major depressive disorder, recurrent, moderate ([address])  Assessment & Plan  · He is not on Abilify, it was discontinued    Type 2 diabetes mellitus with diabetic neuropathy, with long-term current use of insulin [facility])  Assessment & Plan  Lab Results   Component Value Date    HGBA1C 5.3 07/14/2022       Recent Labs     08/30/23  2114 08/31/23  0616 08/31/23  1050 08/31/23  1604   POCGLU 96 91 130 124       Blood Sugar Average: Last 72 hrs:  (P) 100.4472938122894275   Not on  meds at home  Monitor    Opioid dependence, continuous [facility])  Assessment & Plan  • Chronic pain syndrome  • Was on fentanyl pump, switched to morphine, pump will activate in 3 days morphine administration thru intrathecal pain pump        GERD (gastroesophageal reflux disease)  Assessment & Plan  · Continue PPI    CVA (cerebral vascular accident) [facility])  Assessment & Plan  · History of CVA in 2005  · Continue aspirin and statin    Morbid obesity ([address])  Assessment & Plan  · With history of prior gastric sleeve    Medical Problems     Resolved Problems  Date Reviewed: 8/31/2023   None       Discharging Physician / Practitioner: Anjel Fontenot MD  PCP: Leigh Romero MD  Admission Date:   Admission Orders (From admission, onward)     Ordered        08/23/23 2208  Inpatient Admission  Once                      Discharge Date: 08/31/23    Consultations During Hospital Stay:  · Cardiology    Procedures Performed:   · None    Significant Findings / Test Results:   · Cardiac MRI  IMPRESSION:  Impression:  1. Normal left and right ventricular size and systolic function. 2. No significant valvular abnormalities. 3. No obvious evidence of pericarinal inflammation. 4. Patchy intramyocardial fibrosis/edema involving the basal anteroseptal and basal inferior walls. This is highly suggestive of acute myocarditis.       Incidental Findings:   · None    Test Results Pending at Discharge (will require follow up): · None     Outpatient Tests Requested:  · None    Complications: None    Reason for Admission: Chest pain    Hospital Course:   Batsheva Serna is a 63 y.o. male patient who originally presented to the hospital on 8/23/2023 due to chest pain. He had recent admission for chest pain underwent cardiac catheterization which did not reveal any critical lesions and medical management was recommended. Patient presented back to the hospital with chest pain evaluated at [address] [place] [address], recommended transfer to [facility] for cardiac MRI to assess for myopericarditis. He was empirically started on colchicine and high-dose aspirin. He underwent cardiac MRI which showed patchy intramyocardial fibrosis/edema suggestive of acute myocarditis, no evidence of pericardial inflammation. Discussed with cardiology, no need for high-dose aspirin or call his seen. Continue aspirin 81 mg daily, Plavix, PPI. No exercise for 12 weeks. Please see above list of diagnoses and related plan for additional information. Condition at Discharge: good    Discharge Day Visit / Exam:   Subjective: Patient was seen evaluated bedside. Denies chest pain palpitation shortness of breath, no nausea or vomiting. Reita Ramírez to go home  Vitals: Blood Pressure: 129/67 (08/31/23 1537)  Pulse: 70 (08/31/23 1742)  Temperature: 98.5 °F (36.9 °C) (08/31/23 1537)  Temp Source: Oral (08/31/23 1537)  Respirations: 22 (08/31/23 1537)  Height: 6' (182.9 cm) (08/24/23 1245)  Weight - Scale: (!) 137 kg (301 lb) (08/24/23 1245)  SpO2: 93 % (08/31/23 1742)  Exam:   Physical Exam  Constitutional:       General: He is not in acute distress. Appearance: He is obese. HENT:      Head: Atraumatic. Cardiovascular:      Rate and Rhythm: Normal rate and regular rhythm. Heart sounds: No murmur heard. Pulmonary:      Effort: Pulmonary effort is normal.      Breath sounds: Normal breath sounds. Abdominal:      General: Bowel sounds are normal. There is no distension. Palpations: Abdomen is soft. Tenderness: There is no abdominal tenderness. Musculoskeletal:         General: No swelling. Cervical back: Neck supple. Skin:     General: Skin is warm and dry. Neurological:      General: No focal deficit present. Mental Status: He is alert. Psychiatric:         Mood and Affect: Mood normal.          Discussion with Family: Updated  (wife) at bedside. Discharge instructions/Information to patient and family:   See after visit summary for information provided to patient and family. Provisions for Follow-Up Care:  See after visit summary for information related to follow-up care and any pertinent home health orders. Disposition:   Home    Planned Readmission: no     Discharge Statement:  I spent 45 minutes discharging the patient. This time was spent on the day of discharge. I had direct contact with the patient on the day of discharge. Greater than 50% of the total time was spent examining patient, answering all patient questions, arranging and discussing plan of care with patient as well as directly providing post-discharge instructions. Additional time then spent on discharge activities. Discharge Medications:  See after visit summary for reconciled discharge medications provided to patient and/or family.       **Please Note: This note may have been constructed using a voice recognition system**

## 2023-08-31 NOTE — DISCHARGE INSTR - AVS FIRST PAGE
You presented with chest pain. Recent cardiac catheterization revealed patent stents. Due to high suspicious for myopericarditis you were started on high-dose aspirin and colchicine. Cardiac MRI did not show pericardial involvement. There is no need at this point for high-dose aspirin or colchicine. Continue aspirin 81 mg daily. No exercise for 12 weeks.   Repeat echo as outpatient in 8 to 12 weeks  Follow-up with pain management

## 2023-09-06 PROBLEM — I51.4 MYOCARDITIS (HCC): Status: ACTIVE | Noted: 2023-09-06

## 2023-09-06 NOTE — PROGRESS NOTES
Cardiology Office Follow Up  Lacie Ramirez  1960  [id number]      ASSESSMENT:  1. Myocarditis  1. 8/2023 cardiac MRI: Patchy intramyocardial fibrosis/edema involving basal anteroseptal and basal inferior walls highly suggestive of acute myocarditis  2. As there is no pericardial involvement anti-inflammatory medications including colchicine were not continued at this time  3. He was continued on dual antiplatelet therapy at this time and recommended pain management for chest pain as well as abstinence from exercise for 12 weeks with repeat echocardiogram in 12 weeks  2. History of coronary artery disease  a. 2010 per patient had his first MI with stenting of unknown vessel  b. 2015 LH: Distal LAD 85%, proximal circumflex 80%, distal circumflex 90%, first obtuse marginal 80%, mid RCA 40%, distal RCA 40%, PCI/OMER x1 to proximal circumflex  c. 2017 LHC: Distal LAD 95%, proximal circumflex stent patent, mid RCA 85%, PCI/OEMR x1 to mid RCA  d. 2020 LH: LM normal, proximal LAD 85%, mid LAD 5-10%, circumflex 20% mid lesion, obtuse marginal branches luminal irregularities, proximal RCA 20%, distal RCA 30%, PCI/OMER x1 to proximal LAD  e. 2022 LHC: LM luminal irregularities, ostial LAD 30%, proximal LAD stent patent, mid LAD 80% stenosis, ostial to mid circumflex 20%, obtuse marginal branch with luminal irregularities, proximal RCA 20%, distal RCA 30%, RPDA 50%, PCI/OMER x1 in proximal to mid LAD  f. 8/2023 LHC: Distal left main 50%, ostial LAD 40%, mid LAD 40%, mid circumflex 20%, patent LAD stent, circumflex, and RCA stents, medical management recommended  3. Preserved LV systolic function  a. 6/8329 LHC: EF 60 to 65%, RV normal size and systolic function, normal biatrial size, no hemodynamically significant valvular disease  b. 8/2023 Echo: LVEF 60%, no hemodynamically significant valvular disease  4.  Type 2 diabetes    PLAN/ DISCUSSION:  • Slightly volume overloaded today and he reports 10 pound weight gain so we will prescribe Lasix 40 mg daily with 20 meq daily of potassium and check a basic metabolic panel in about 6 or 7 days  • Otherwise no changes were made to his medical regimen today  • He remains on multimodal antianginal therapy and does have some residual chest pain but this is fairly well controlled  • We will arrange close follow-up in 1 month  • Plan to repeat echo in early November    Interval History/ HPI:   This is a 63-year-old gentleman with a very long history of coronary artery disease recently hospitalized in August 2023 with chest pain. He initially underwent cardiac catheterization which showed nonobstructive coronary artery disease and medical management was recommended. He was discharged in stable condition. He returned to the hospital however with worsening chest pain. Cardiac MRI at this time showed myocarditis. He was treated conservatively and did well. He has preserved LV systolic function. He is here today for posthospital follow-up. Today comes the office accompanied by his son. He reports since his discharge he has been having trouble urinating. He has had an unintentional 10 pound weight gain. He does feel some swelling this is primarily in his abdomen and his legs. He has some residual chest pain but he describes this as only here and there and not particularly severe. His pain pump has been changed to morphine which is helping control his pain better. He does have some shortness of breath which she attributes to the weight gain. He otherwise has no palpitations. He has had no episodes of passing out.      Vitals:  /64 (BP Location: Right arm, Patient Position: Sitting, Cuff Size: Large)   Pulse 59   Ht 6' 1" (1.854 m)   Wt (!) 141 kg (311 lb)   BMI 41.03 kg/m²      Past Medical History:   Diagnosis Date   • Acute on chronic diastolic congestive heart failure (HCC)    • Altered gait    • Alzheimer disease (HCC)     per patients,,early onset    • Angina pectoris ([address])    • Anxiety    • Arthritis    • Brain aneurysm     coils placed   • Cardiac disease    • Chest pain 1/13/2016   • Chronic kidney disease    • Chronic pain     back/ right groin and rle- has morphine pump   • Constipation    • COPD (chronic obstructive pulmonary disease) ([facility])    • Coronary artery disease    • CPAP (continuous positive airway pressure) dependence    • Decubital ulcer     sacral decub-occured 5/2019-sees wound care/debide in OR today 6/6/2019   • Dependent on walker for ambulation     w/c for long distance   • Depression    • Diabetes mellitus ([facility])     insulin dependent   • Dizziness     occ   • Dysphagia    • Enlarged prostate    • Fall    • GERD (gastroesophageal reflux disease)    • Heart failure ([address])    • Hiatal hernia    • Hx of gastric bypass 11/19/2018   • Hypercholesterolemia    • Hypertension    • MI (myocardial infarction) ([address])     2017- stents x2   • Migraine    • Morbid obesity ([address])     gastric bypass sleeve 11/2018-wt loss 125 lb   • Neuropathy    • Oxygen dependent     Q HS  2LPM with CPAP and prn during day 2-3 LPM    • Pressure injury of skin    • Renal disorder    • Shortness of breath    • Skin abnormality     sacral wound - covered with pad   • Sleep apnea    • Stented coronary artery    • Stroke ([address])     vision loss b/l  2005, residual R leg weakness   • Urinary frequency    • Use of cane as ambulatory aid    • Wears dentures    • Wears glasses    • Wears glasses    • Wheelchair dependent      Social History     Socioeconomic History   • Marital status: /Civil Union     Spouse name: Reva   • Number of children: 5   • Years of education: Not on file   • Highest education level: GED or equivalent   Occupational History   • Not on file   Tobacco Use   • Smoking status: Never   • Smokeless tobacco: Never   Vaping Use   • Vaping Use: Never used   Substance and Sexual Activity   • Alcohol use: Not Currently   • Drug use: Not Currently     Types: Marijuana   • Sexual activity: Not Currently   Other Topics Concern   • Not on file   Social History Narrative   • Not on file     Social Determinants of Health     Financial Resource Strain: High Risk (8/28/2020)    Overall Financial Resource Strain (CARDIA)    • Difficulty of Paying Living Expenses: Very hard   Food Insecurity: No Food Insecurity (8/25/2023)    Hunger Vital Sign    • Worried About Running Out of Food in the Last Year: Never true    • Ran Out of Food in the Last Year: Never true   Transportation Needs: No Transportation Needs (8/25/2023)    PRAPARE - Transportation    • Lack of Transportation (Medical): No    • Lack of Transportation (Non-Medical): No   Physical Activity: Inactive (9/6/2019)    Exercise Vital Sign    • Days of Exercise per Week: 0 days    • Minutes of Exercise per Session: 0 min   Stress: Stress Concern Present (9/6/2019)    [facility]    • Feeling of Stress : Very much   Social Connections:  Moderately Isolated (9/6/2019)    Social Connection and Isolation Panel [NHANES]    • Frequency of Communication with Friends and Family: Never    • Frequency of Social Gatherings with Friends and Family: Twice a week    • Attends Orthodox Services: 1 to 4 times per year    • Active Member of Clubs or Organizations: No    • Attends Club or Organization Meetings: Never    • Marital Status:    Intimate Partner Violence: Not At Risk (9/6/2019)    Humiliation, Afraid, Rape, and Kick questionnaire    • Fear of Current or Ex-Partner: No    • Emotionally Abused: No    • Physically Abused: No    • Sexually Abused: No   Housing Stability: Low Risk  (8/25/2023)    Housing Stability Vital Sign    • Unable to Pay for Housing in the Last Year: No    • Number of Places Lived in the Last Year: 1    • Unstable Housing in the Last Year: No      Family History   Problem Relation Age of Onset   • Diabetes unspecified Mother    • Diabetes unspecified Brother    • Diabetes unspecified Paternal Uncle    • Diabetes unspecified Maternal Grandmother    • Diabetes unspecified Paternal Grandmother    • Diabetes unspecified Brother    • Heart attack Father      Past Surgical History:   Procedure Laterality Date   • BACK SURGERY     • BRAIN SURGERY     • CARDIAC CATHETERIZATION      with stents   • CARDIAC CATHETERIZATION N/A 8/18/2023    Procedure: Cardiac Coronary Angiogram;  Surgeon: Bruce Ramon MD;  [facility]: [facility]; Service: Cardiology   • CEREBRAL ANEURYSM REPAIR      with coils   • COLONOSCOPY     • ESOPHAGOGASTRODUODENOSCOPY N/A 7/1/2016    Procedure: ESOPHAGOGASTRODUODENOSCOPY (EGD); Surgeon: Yasmine Huang MD;  Location: [facility]; Service:    • GASTRIC BYPASS  11/19/2018   • HERNIA REPAIR     • HIATAL HERNIA REPAIR     • INFUSION PUMP IMPLANTATION Left     morphine   • INTRATHECAL PUMP IMPLANTATION Left 7/9/2020    Procedure: REVISION INTRATHECAL PAIN PUMP POCKET, LEFT ABDOMEN;  Surgeon: Rolando Kyle MD;  Location: [facility];  Service: Neurosurgery   • KNEE ARTHROSCOPY Right    • KNEE ARTHROSCOPY Right    • PERONEAL NERVE DECOMPRESSION Right    • IL DEBRIDEMENT OPEN WOUND 20 SQ CM/< N/A 6/6/2019    Procedure: EXCISIONAL DEBRIDEMENT OF SACRAL DECUBITUS ULCER;  Surgeon: Anel Blakely MD;  [facility];  Service: General   • IL ESOPHAGOGASTRODUODENOSCOPY TRANSORAL DIAGNOSTIC N/A 2/27/2017    Procedure: ESOPHAGOGASTRODUODENOSCOPY (EGD); Surgeon: Yasmine Huang MD;  Location: [facility]; Service: Gastroenterology   • IL ESOPHAGOGASTRODUODENOSCOPY TRANSORAL DIAGNOSTIC N/A 8/23/2018    Procedure: ESOPHAGOGASTRODUODENOSCOPY (EGD) with biopsy;  Surgeon: Yasmine Huang MD;  [facility];   Service: Gastroenterology   • IL IMPLTJ/RPLCMT ITHCL/EDRL DRUG NFS PRGRBL PUMP Left 10/13/2020    Procedure: EXPLORATION OF INTRATHECAL PAIN PUMP SYSTEM INTEGRITY FOR POSSIBLE REPLACEMENT OF CATHETER AND PUMP.;  Surgeon: Rolando Kyle MD;  Location:  MAIN OR;  Service: Neurosurgery   • LA IMPLTJ/RPLCMT ITHCL/EDRL DRUG NFS SUBQ RSVR N/A 1/19/2017    Procedure: REMOVAL / EXCHANGE INTRATHECAL PAIN PUMP;  Surgeon: Jose Tejeda MD;  [facility];  Service: Orthopedics   • LA IMPLTJ/RPLCMT ITHCL/EDRL DRUG NFS SUBQ RSVR N/A 5/16/2016    Procedure: REPLACEMENT AND PROGRAM PUMP ;  Surgeon: Jose Tejeda MD;  [facility];  Service: Orthopedics   • LA PRQ [address] NSTIM ELECTRODE ARRAY EPIDURAL Right 3/17/2021    Procedure: INSERTION THORACIC DORSAL COLUMN SPINAL CORD STIMULATOR PERCUTANEOUS W IMPLANTABLE PULSE GENERATOR, RIGHT;  Surgeon: Elder Kang MD;  Location[facility];  Service: Neurosurgery       Current Outpatient Medications:   •  albuterol (ACCUNEB) 1.25 MG/3ML nebulizer solution, Take 1 ampule by nebulization every 6 (six) hours as needed for wheezing, Disp: , Rfl:   •  amLODIPine (NORVASC) 5 mg tablet, Take 1 tablet (5 mg total) by mouth daily Do not start before August 22, 2023., Disp: 30 tablet, Rfl: 0  •  aspirin 81 mg chewable tablet, Chew 1 tablet (81 mg total) daily Do not start before August 22, 2023., Disp: 30 tablet, Rfl: 0  •  atorvastatin (LIPITOR) 80 mg tablet, Take 1 tablet (80 mg total) by mouth daily after dinner, Disp: 30 tablet, Rfl: 0  •  baclofen 10 mg tablet, Take 10 mg by mouth 3 (three) times a day, Disp: , Rfl:   •  CALCIUM PO, Take 1 tablet by mouth daily, Disp: , Rfl:   •  carvedilol (COREG) 12.5 mg tablet, Take 1 tablet (12.5 mg total) by mouth 2 (two) times a day with meals, Disp: 60 tablet, Rfl: 0  •  clopidogrel (PLAVIX) 75 mg tablet, Take 75 mg by mouth daily, Disp: , Rfl:   •  Cyanocobalamin (VITAMIN B-12 PO), Take 1 tablet by mouth daily, Disp: , Rfl:   •  folic acid (FOLVITE) 1 mg tablet, Take 1 mg by mouth daily , Disp: , Rfl: 3  •  gabapentin (NEURONTIN) 300 mg capsule, TAKE 1 CAPSULE (300 MG TOTAL) BY MOUTH AS NEEDED (MIGRAINE), Disp: 30 capsule, Rfl: 0  •  gabapentin (NEURONTIN) 600 MG tablet, Take 600 mg by mouth 3 (three) times a day, Disp: , Rfl:   •  hydrocortisone 1 % lotion, APPLY TOPICALLY 2 (TWO) TIMES A DAY INDICATIONS  USING ON FEET., Disp: , Rfl:   •  isosorbide mononitrate (IMDUR) 60 mg 24 hr tablet, Take 1 tablet (60 mg total) by mouth daily Do not start before August 22, 2023., Disp: 30 tablet, Rfl: 0  •  methocarbamol (ROBAXIN) 750 mg tablet, Take 1 tablet (750 mg total) by mouth every 6 (six) hours as needed for muscle spasms, Disp: 28 tablet, Rfl: 0  •  morphine (MSIR) 30 MG tablet, Take 30 mg by mouth every 8 (eight) hours Prescribed on 8/22. Take for 5 days rx by Dr. Michael Flores (pain management), Disp: , Rfl:   •  Morphine Sulfate Microinfusion (MORPHINE SULF MICROINFUSION PF IJ), Inject 14 mg as directed daily Via infusion pump, Disp: , Rfl:   •  nitroglycerin (NITROSTAT) 0.4 mg SL tablet, Place 0.4 mg under the tongue every 5 (five) minutes as needed for chest pain (pt has not  used recently). , Disp: , Rfl:   •  nystatin (MYCOSTATIN) cream, Apply 1 application topically 2 (two) times a day, Disp: , Rfl:   •  omeprazole (PriLOSEC) 40 MG capsule, Take 40 mg by mouth daily, Disp: , Rfl:   •  ondansetron (ZOFRAN) 4 mg tablet, Take 1 tablet (4 mg total) by mouth every 8 (eight) hours as needed for nausea or vomiting, Disp: 60 tablet, Rfl: 3  •  Pediatric Multivit-Minerals-C (Polyvitamin/Iron) CHEW, Chew 1 tablet daily, Disp: , Rfl:   •  ranolazine (RANEXA) 1000 MG SR tablet, Take 1 tablet (1,000 mg total) by mouth every 12 (twelve) hours, Disp: 60 tablet, Rfl: 0  •  tamsulosin (FLOMAX) 0.4 mg, Take 0.4 mg by mouth daily with dinner., Disp: , Rfl:   •  [employer]IC[employer] SHORT PEN NEEDLES 31G X 8 MM MISC, 4 INJECTIONS PER DAY DX  E11.8, Disp: , Rfl: 1      Review of Systems:  Review of Systems   Constitutional: Positive for unexpected weight change. Negative for chills and fever. HENT: Negative for ear pain and sore throat. Eyes: Negative for pain and visual disturbance.    Respiratory: Positive for shortness of breath. Negative for cough. Cardiovascular: Positive for chest pain and leg swelling. Negative for palpitations. Gastrointestinal: Positive for abdominal distention. Negative for abdominal pain and vomiting. Genitourinary: Negative for dysuria and hematuria. Musculoskeletal: Negative for arthralgias and back pain. Skin: Negative for color change and rash. Neurological: Negative for seizures and syncope. All other systems reviewed and are negative. Physical Exam:  Physical Exam  Constitutional:       Appearance: He is well-developed. HENT:      Head: Normocephalic and atraumatic. Eyes:      Pupils: Pupils are equal, round, and reactive to light. Cardiovascular:      Rate and Rhythm: Normal rate and regular rhythm. Heart sounds: No murmur heard. No friction rub. No gallop. Pulmonary:      Effort: Pulmonary effort is normal. No respiratory distress. Breath sounds: Normal breath sounds. No wheezing or rales. Abdominal:      General: Bowel sounds are normal. There is no distension. Palpations: Abdomen is soft. Tenderness: There is no abdominal tenderness. Comments: Abdomen does not appear particularly distended but unsure of his baseline   Musculoskeletal:         General: Swelling present. No deformity. Normal range of motion. Cervical back: Normal range of motion and neck supple. Comments: Trace- 1+ edema   Skin:     General: Skin is warm and dry. Neurological:      Mental Status: He is alert and oriented to person, place, and time. Psychiatric:         Behavior: Behavior normal.         This note was completed in part utilizing M-Modal Fluency Direct Software. Grammatical errors, random word insertions, spelling mistakes, and incomplete sentences can be an occasional consequence of this system secondary to software limitations, ambient noise, and hardware issues.   If you have any questions or concerns about the content, text, or information contained within the body of this dictation, please contact the provider for clarification.

## 2023-09-08 ENCOUNTER — OFFICE VISIT (OUTPATIENT)
Dept: CARDIOLOGY CLINIC | Facility: CLINIC | Age: 63
End: 2023-09-08
Payer: MEDICARE

## 2023-09-08 ENCOUNTER — TELEPHONE (OUTPATIENT)
Age: 63
End: 2023-09-08

## 2023-09-08 VITALS
DIASTOLIC BLOOD PRESSURE: 64 MMHG | HEIGHT: 73 IN | BODY MASS INDEX: 41.22 KG/M2 | HEART RATE: 59 BPM | SYSTOLIC BLOOD PRESSURE: 126 MMHG | WEIGHT: 311 LBS

## 2023-09-08 DIAGNOSIS — N18.31 STAGE 3A CHRONIC KIDNEY DISEASE (HCC): ICD-10-CM

## 2023-09-08 DIAGNOSIS — I21.3 STEMI (ST ELEVATION MYOCARDIAL INFARCTION) (HCC): ICD-10-CM

## 2023-09-08 DIAGNOSIS — I70.209 ARTERIOSCLEROSIS OF ARTERY OF EXTREMITY (HCC): ICD-10-CM

## 2023-09-08 DIAGNOSIS — I50.32 CHRONIC DIASTOLIC CONGESTIVE HEART FAILURE (HCC): ICD-10-CM

## 2023-09-08 DIAGNOSIS — I25.10 CORONARY ARTERY DISEASE INVOLVING NATIVE CORONARY ARTERY OF NATIVE HEART WITHOUT ANGINA PECTORIS: Primary | ICD-10-CM

## 2023-09-08 DIAGNOSIS — I51.4 MYOCARDITIS, UNSPECIFIED CHRONICITY, UNSPECIFIED MYOCARDITIS TYPE (HCC): ICD-10-CM

## 2023-09-08 PROCEDURE — 99214 OFFICE O/P EST MOD 30 MIN: CPT | Performed by: PHYSICIAN ASSISTANT

## 2023-09-08 RX ORDER — BUSPIRONE HYDROCHLORIDE 5 MG/1
TABLET ORAL
COMMUNITY
Start: 2023-07-16

## 2023-09-08 RX ORDER — LOSARTAN POTASSIUM 50 MG/1
50 TABLET ORAL DAILY
Qty: 90 TABLET | Refills: 1 | Status: SHIPPED | OUTPATIENT
Start: 2023-09-08

## 2023-09-08 RX ORDER — RANOLAZINE 1000 MG/1
1000 TABLET, EXTENDED RELEASE ORAL EVERY 12 HOURS SCHEDULED
Qty: 60 TABLET | Refills: 3 | Status: SHIPPED | OUTPATIENT
Start: 2023-09-08

## 2023-09-08 RX ORDER — AMLODIPINE BESYLATE 5 MG/1
5 TABLET ORAL DAILY
Qty: 90 TABLET | Refills: 1 | Status: SHIPPED | OUTPATIENT
Start: 2023-09-08

## 2023-09-08 RX ORDER — CLOPIDOGREL BISULFATE 75 MG/1
75 TABLET ORAL DAILY
Qty: 90 TABLET | Refills: 1 | Status: SHIPPED | OUTPATIENT
Start: 2023-09-08

## 2023-09-08 RX ORDER — FUROSEMIDE 40 MG/1
40 TABLET ORAL DAILY
Qty: 30 TABLET | Refills: 3 | Status: SHIPPED | OUTPATIENT
Start: 2023-09-08

## 2023-09-08 RX ORDER — TRAZODONE HYDROCHLORIDE 50 MG/1
50 TABLET ORAL 2 TIMES DAILY
COMMUNITY

## 2023-09-08 RX ORDER — LIDOCAINE 50 MG/G
PATCH TOPICAL
COMMUNITY
Start: 2023-08-21

## 2023-09-08 RX ORDER — LOSARTAN POTASSIUM 50 MG/1
TABLET ORAL
COMMUNITY
Start: 2023-07-05 | End: 2023-09-08 | Stop reason: SDUPTHER

## 2023-09-08 RX ORDER — FLUTICASONE PROPIONATE AND SALMETEROL 500; 50 UG/1; UG/1
POWDER RESPIRATORY (INHALATION)
COMMUNITY

## 2023-09-08 RX ORDER — ATORVASTATIN CALCIUM 80 MG/1
80 TABLET, FILM COATED ORAL
Qty: 90 TABLET | Refills: 1 | Status: SHIPPED | OUTPATIENT
Start: 2023-09-08

## 2023-09-08 RX ORDER — ASPIRIN 81 MG/1
81 TABLET, CHEWABLE ORAL DAILY
Qty: 90 TABLET | Refills: 1 | Status: SHIPPED | OUTPATIENT
Start: 2023-09-08

## 2023-09-08 RX ORDER — POTASSIUM CHLORIDE 20 MEQ/1
20 TABLET, EXTENDED RELEASE ORAL DAILY
Qty: 30 TABLET | Refills: 3 | Status: SHIPPED | OUTPATIENT
Start: 2023-09-08

## 2023-09-08 RX ORDER — CARVEDILOL 12.5 MG/1
12.5 TABLET ORAL 2 TIMES DAILY WITH MEALS
Qty: 60 TABLET | Refills: 3 | Status: SHIPPED | OUTPATIENT
Start: 2023-09-08

## 2023-09-12 NOTE — PROGRESS NOTES
Patient ID: Anabell Bell is a 63 y.o. male Date of Birth 1960       No chief complaint on file. Diagnosis:  1. Onychomycosis    2. Type 2 diabetes mellitus with diabetic neuropathy, with long-term current use of insulin ([address])         1. Initial/ Annual  Diabetic Foot exam with socks and shoes removed bilaterally. 2. High risk  foot precautions reviewed with patient including the need to wear protective shoegear at all times when walking including in the home, the need to check feet all surfaces daily with a mirror and report and skin breaks to podiatry, the need to apply an emollient to skin of feet daily. 3. We discussed proper glycemic control and the risk of pedal complications with hyperglycemia. 4.  Toenails were debrided x10 without incident. 5.  Patient was prescribed 1 pair diabetic shoes and 3 pair custom molded inserts, he was given information on where to go to get these. In the meantime I recommended him to go to [facility] in [place] to get fitted for proper shoe gear. Subjective:   Cyndee Hernandez presents today for annual diabetic foot exam, he is a type II diabetic who is now diet controlled as he underwent gastric bypass, most recent HbA1c was 5.3 on 7/14/2023. Patient was recently hospitalized for chest pain and discharged on 8/31/2023. He presents today for nail care and he knows he has neuropathy.       The following portions of the patient's history were reviewed and updated as appropriate:     Past Medical History:   Diagnosis Date   • Acute on chronic diastolic congestive heart failure (HCC)    • Altered gait    • Alzheimer disease ([address])     per patients,,early onset    • Angina pectoris (HCC)    • Anxiety    • Arthritis    • Brain aneurysm     coils placed   • Cardiac disease    • Chest pain 1/13/2016   • Chronic kidney disease    • Chronic pain     back/ right groin and rle- has morphine pump   • Constipation    • COPD (chronic obstructive pulmonary disease) [facility])    • Coronary artery disease    • CPAP (continuous positive airway pressure) dependence    • Decubital ulcer     sacral decub-occured 5/2019-sees wound care/debide in OR today 6/6/2019   • Dependent on walker for ambulation     w/c for long distance   • Depression    • Diabetes mellitus (HCC)     insulin dependent   • Dizziness     occ   • Dysphagia    • Enlarged prostate    • Fall    • GERD (gastroesophageal reflux disease)    • Heart failure ([address])    • Hiatal hernia    • Hx of gastric bypass 11/19/2018   • Hypercholesterolemia    • Hypertension    • MI (myocardial infarction) ([address])     2017- stents x2   • Migraine    • Morbid obesity ([address])     gastric bypass sleeve 11/2018-wt loss 125 lb   • Neuropathy    • Oxygen dependent     Q HS  2LPM with CPAP and prn during day 2-3 LPM    • Pressure injury of skin    • Renal disorder    • Shortness of breath    • Skin abnormality     sacral wound - covered with pad   • Sleep apnea    • Stented coronary artery    • Stroke ([address])     vision loss b/l  2005, residual R leg weakness   • Urinary frequency    • Use of cane as ambulatory aid    • Wears dentures    • Wears glasses    • Wears glasses    • Wheelchair dependent        Past Surgical History:   Procedure Laterality Date   • BACK SURGERY     • BRAIN SURGERY     • CARDIAC CATHETERIZATION      with stents   • CARDIAC CATHETERIZATION N/A 8/18/2023    Procedure: Cardiac Coronary Angiogram;  Surgeon: Violetta Presley MD;  Location: [facility]; Service: Cardiology   • CEREBRAL ANEURYSM REPAIR      with coils   • COLONOSCOPY     • ESOPHAGOGASTRODUODENOSCOPY N/A 7/1/2016    Procedure: ESOPHAGOGASTRODUODENOSCOPY (EGD); Surgeon: Erin Nj MD;  Location: [facility] GI [facility];   Service:    • GASTRIC BYPASS  11/19/2018   • HERNIA REPAIR     • HIATAL HERNIA REPAIR     • INFUSION PUMP IMPLANTATION Left     morphine   • INTRATHECAL PUMP IMPLANTATION Left 7/9/2020    Procedure: REVISION INTRATHECAL PAIN PUMP POCKET, LEFT ABDOMEN; Surgeon: Wale Mera MD;  Location: [facility];  Service: Neurosurgery   • KNEE ARTHROSCOPY Right    • KNEE ARTHROSCOPY Right    • PERONEAL NERVE DECOMPRESSION Right    • ME DEBRIDEMENT OPEN WOUND 20 SQ CM/< N/A 6/6/2019    Procedure: EXCISIONAL DEBRIDEMENT OF SACRAL DECUBITUS ULCER;  Surgeon: Korina Diop MD;  Location[facility];  Service: General   • ME ESOPHAGOGASTRODUODENOSCOPY TRANSORAL DIAGNOSTIC N/A 2/27/2017    Procedure: ESOPHAGOGASTRODUODENOSCOPY (EGD); Surgeon: Juliane Manning MD;  Location: [facility]; Service: Gastroenterology   • ME ESOPHAGOGASTRODUODENOSCOPY TRANSORAL DIAGNOSTIC N/A 8/23/2018    Procedure: ESOPHAGOGASTRODUODENOSCOPY (EGD) with biopsy;  Surgeon: Juliane Manning MD;  [facility];   Service: Gastroenterology   • ME IMPLTJ/RPLCMT ITHCL/EDRL DRUG NFS PRGRBL PUMP Left 10/13/2020    Procedure: EXPLORATION OF INTRATHECAL PAIN PUMP SYSTEM INTEGRITY FOR POSSIBLE REPLACEMENT OF CATHETER AND PUMP.;  Surgeon: Wale Mera MD;  Location:  MAIN OR;  Service: Neurosurgery   • ME IMPLTJ/RPLCMT ITHCL/EDRL DRUG NFS SUBQ RSVR N/A 1/19/2017    Procedure: REMOVAL / EXCHANGE INTRATHECAL PAIN PUMP;  Surgeon: Eliane Robledo MD;  [facility];  Service: Orthopedics   • ME IMPLTJ/RPLCMT ITHCL/EDRL DRUG NFS SUBQ RSVR N/A 5/16/2016    Procedure: REPLACEMENT AND PROGRAM PUMP ;  Surgeon: Eliane Robledo MD;  [facility];  Service: Orthopedics   • ME PRQ IMPLTJ NSTIM ELECTRODE ARRAY EPIDURAL Right 3/17/2021    Procedure: INSERTION THORACIC DORSAL COLUMN SPINAL CORD STIMULATOR PERCUTANEOUS W IMPLANTABLE PULSE GENERATOR, RIGHT;  Surgeon: Wale Mera MD;  Location[facility];  Service: Neurosurgery       Social History     Socioeconomic History   • Marital status: /Civil Union     Spouse name: Jordan Smith   • Number of children: 5   • Years of education: None   • Highest education level: GED or equivalent   Occupational History   • None   Tobacco Use   • Smoking status: Never   • Smokeless tobacco: Never   Vaping Use   • Vaping Use: Never used   Substance and Sexual Activity   • Alcohol use: Not Currently   • Drug use: Not Currently     Types: Marijuana   • Sexual activity: Not Currently   Other Topics Concern   • None   Social History Narrative   • None     Social Determinants of Health     Financial Resource Strain: High Risk (8/28/2020)    Overall Financial Resource Strain (CARDIA)    • Difficulty of Paying Living Expenses: Very hard   Food Insecurity: No Food Insecurity (8/25/2023)    Hunger Vital Sign    • Worried About Running Out of Food in the Last Year: Never true    • Ran Out of Food in the Last Year: Never true   Transportation Needs: No Transportation Needs (8/25/2023)    PRAPARE - Transportation    • Lack of Transportation (Medical): No    • Lack of Transportation (Non-Medical): No   Physical Activity: Inactive (9/6/2019)    Exercise Vital Sign    • Days of Exercise per Week: 0 days    • Minutes of Exercise per Session: 0 min   Stress: Stress Concern Present (9/6/2019)    [facility]    • Feeling of Stress : Very much   Social Connections:  Moderately Isolated (9/6/2019)    Social Connection and Isolation Panel [NHANES]    • Frequency of Communication with Friends and Family: Never    • Frequency of Social Gatherings with Friends and Family: Twice a week    • Attends Orthodoxy Services: 1 to 4 times per year    • Active Member of Clubs or Organizations: No    • Attends Club or Organization Meetings: Never    • Marital Status:    Intimate Partner Violence: Not At Risk (9/6/2019)    Humiliation, Afraid, Rape, and Kick questionnaire    • Fear of Current or Ex-Partner: No    • Emotionally Abused: No    • Physically Abused: No    • Sexually Abused: No   Housing Stability: Low Risk  (8/25/2023)    Housing Stability Vital Sign    • Unable to Pay for Housing in the Last Year: No    • Number of [address] in the Last Year: 1 • Unstable Housing in the Last Year: No          Current Outpatient Medications:   •  albuterol (ACCUNEB) 1.25 MG/3ML nebulizer solution, Take 1 ampule by nebulization every 6 (six) hours as needed for wheezing, Disp: , Rfl:   •  amLODIPine (NORVASC) 5 mg tablet, Take 1 tablet (5 mg total) by mouth daily, Disp: 90 tablet, Rfl: 1  •  aspirin 81 mg chewable tablet, Chew 1 tablet (81 mg total) daily, Disp: 90 tablet, Rfl: 1  •  atorvastatin (LIPITOR) 80 mg tablet, Take 1 tablet (80 mg total) by mouth daily after dinner, Disp: 90 tablet, Rfl: 1  •  baclofen 10 mg tablet, Take 10 mg by mouth 3 (three) times a day, Disp: , Rfl:   •  busPIRone (BUSPAR) 5 mg tablet, , Disp: , Rfl:   •  CALCIUM PO, Take 1 tablet by mouth daily, Disp: , Rfl:   •  carvedilol (COREG) 12.5 mg tablet, Take 1 tablet (12.5 mg total) by mouth 2 (two) times a day with meals, Disp: 60 tablet, Rfl: 3  •  clopidogrel (PLAVIX) 75 mg tablet, Take 1 tablet (75 mg total) by mouth daily, Disp: 90 tablet, Rfl: 1  •  Cyanocobalamin (VITAMIN B-12 PO), Take 1 tablet by mouth daily, Disp: , Rfl:   •  Fluticasone-Salmeterol (Advair Diskus) 500-50 mcg/dose inhaler, , Disp: , Rfl:   •  folic acid (FOLVITE) 1 mg tablet, Take 1 mg by mouth daily , Disp: , Rfl: 3  •  furosemide (LASIX) 40 mg tablet, Take 1 tablet (40 mg total) by mouth daily, Disp: 30 tablet, Rfl: 3  •  gabapentin (NEURONTIN) 300 mg capsule, TAKE 1 CAPSULE (300 MG TOTAL) BY MOUTH AS NEEDED (MIGRAINE), Disp: 30 capsule, Rfl: 0  •  gabapentin (NEURONTIN) 600 MG tablet, Take 1,200 mg by mouth 2 (two) times a day, Disp: , Rfl:   •  hydrocortisone 1 % lotion, APPLY TOPICALLY 2 (TWO) TIMES A DAY INDICATIONS  USING ON FEET., Disp: , Rfl:   •  isosorbide mononitrate (IMDUR) 60 mg 24 hr tablet, Take 1 tablet (60 mg total) by mouth daily Do not start before August 22, 2023., Disp: 30 tablet, Rfl: 0  •  lidocaine (LIDODERM) 5 %, , Disp: , Rfl:   •  losartan (COZAAR) 50 mg tablet, Take 1 tablet (50 mg total) by mouth daily, Disp: 90 tablet, Rfl: 1  •  methocarbamol (ROBAXIN) 750 mg tablet, Take 1 tablet (750 mg total) by mouth every 6 (six) hours as needed for muscle spasms, Disp: 28 tablet, Rfl: 0  •  Morphine Sulfate Microinfusion (MORPHINE SULF MICROINFUSION PF IJ), Inject 14 mg as directed daily Via infusion pump, Disp: , Rfl:   •  nitroglycerin (NITROSTAT) 0.4 mg SL tablet, Place 0.4 mg under the tongue every 5 (five) minutes as needed for chest pain (pt has not  used recently). , Disp: , Rfl:   •  nystatin (MYCOSTATIN) cream, Apply 1 application topically 2 (two) times a day, Disp: , Rfl:   •  omeprazole (PriLOSEC) 40 MG capsule, Take 40 mg by mouth daily, Disp: , Rfl:   •  ondansetron (ZOFRAN) 4 mg tablet, Take 1 tablet (4 mg total) by mouth every 8 (eight) hours as needed for nausea or vomiting, Disp: 60 tablet, Rfl: 3  •  Pediatric Multivit-Minerals-C (Polyvitamin/Iron) CHEW, Chew 1 tablet daily, Disp: , Rfl:   •  potassium chloride (K-DUR,KLOR-CON) 20 mEq tablet, Take 1 tablet (20 mEq total) by mouth daily, Disp: 30 tablet, Rfl: 3  •  ranolazine (RANEXA) 1000 MG SR tablet, Take 1 tablet (1,000 mg total) by mouth every 12 (twelve) hours, Disp: 60 tablet, Rfl: 3  •  tamsulosin (FLOMAX) 0.4 mg, Take 0.4 mg by mouth daily with dinner., Disp: , Rfl:   •  traZODone (DESYREL) 50 mg tablet, Take 50 mg by mouth 2 (two) times a day, Disp: , Rfl:   •  ULTICARE SHORT PEN NEEDLES 31G X 8 MM MISC, 4 INJECTIONS PER DAY DX  E11.8, Disp: , Rfl: 1    Allergies  Patient has no known allergies.     Family History   Problem Relation Age of Onset   • Diabetes unspecified Mother    • Diabetes unspecified Brother    • Diabetes unspecified Paternal Uncle    • Diabetes unspecified Maternal Grandmother    • Diabetes unspecified Paternal Grandmother    • Diabetes unspecified Brother    • Heart attack Father            Objective:  /72   Ht 6' 1" (1.854 m)   Wt (!) 140 kg (308 lb)   BMI 40.64 kg/m²     Review of Systems Constitutional: Negative for chills and fever. HENT: Negative for ear pain and sore throat. Eyes: Negative for pain and visual disturbance. Respiratory: Negative for cough and shortness of breath. Cardiovascular: Negative for chest pain and palpitations. Gastrointestinal: Negative for abdominal pain and vomiting. Genitourinary: Negative for dysuria and hematuria. Musculoskeletal: Negative for arthralgias and back pain. Skin: Negative for color change and rash. Long toenails   Neurological: Positive for numbness. Negative for seizures and syncope. All other systems reviewed and are negative. Physical Exam  Constitutional:       Appearance: Normal appearance. He is obese. HENT:      Head: Normocephalic and atraumatic. Right Ear: External ear normal.      Left Ear: External ear normal.      Nose: Nose normal.      Mouth/Throat:      Mouth: Mucous membranes are moist.      Pharynx: Oropharynx is clear. Eyes:      Conjunctiva/sclera: Conjunctivae normal.   Cardiovascular:      Pulses: Normal pulses. no weak pulses          Dorsalis pedis pulses are 2+ on the right side and 2+ on the left side. Posterior tibial pulses are 2+ on the right side and 2+ on the left side. Pulmonary:      Effort: Pulmonary effort is normal.   Musculoskeletal:      Cervical back: Normal range of motion. Right lower le+ Pitting Edema present. Left lower le+ Pitting Edema present. Right foot: Decreased range of motion. Deformity present. Left foot: Decreased range of motion. Deformity present. Feet:      Right foot:      Protective Sensation: 10 sites tested. 0 sites sensed. Skin integrity: Skin integrity normal. No ulcer, skin breakdown, erythema, warmth, callus or dry skin. Toenail Condition: Right toenails are abnormally thick and long. Fungal disease present. Left foot:      Protective Sensation: 10 sites tested. 0 sites sensed.       Skin integrity: Skin integrity normal. No ulcer, skin breakdown, erythema, warmth, callus or dry skin. Toenail Condition: Left toenails are abnormally thick and long. Fungal disease present. Comments: Charcot deformity bilateral  Neurological:      Mental Status: He is alert. Mental status is at baseline. Psychiatric:         Mood and Affect: Mood normal.         Behavior: Behavior normal.       Diabetic Foot Exam    Patient's shoes and socks removed. Right Foot/Ankle   Right Foot Inspection  Skin Exam: skin normal and skin intact. No dry skin, no warmth, no callus, no erythema, no maceration, no abnormal color, no pre-ulcer, no ulcer and no callus. Toe Exam: ROM and strength within normal limits. Sensory   Vibration: absent  Proprioception: absent  Monofilament testing: absent    Vascular  Capillary refills: < 3 seconds  The right DP pulse is 2+. The right PT pulse is 2+. Right Toe  - Comprehensive Exam  Ecchymosis: none  Arch: pes planus  Hammertoes: absent  Claw Toes: absent  Swelling: none   Tenderness: none         Left Foot/Ankle  Left Foot Inspection  Skin Exam: skin normal and skin intact. No dry skin, no warmth, no erythema, no maceration, normal color, no pre-ulcer, no ulcer and no callus. Toe Exam: ROM and strength within normal limits. Sensory   Vibration: absent  Proprioception: absent  Monofilament testing: absent    Vascular  Capillary refills: < 3 seconds  The left DP pulse is 2+. The left PT pulse is 2+. Left Toe  - Comprehensive Exam  Ecchymosis: none  Arch: pes planus  Hammertoes: absent  Claw toes: absent  Swelling: none   Tenderness: none           Assign Risk Category  Deformity present  Loss of protective sensation  No weak pulses  Risk: 2                 No pertinent results found. Rolm Netter, DPM, DPM, FACFAS    Portions of the record may have been created with voice recognition software.  Occasional wrong word or "sound a like" substitutions may have occurred due to the inherent limitations of voice recognition software. Read the chart carefully and recognize, using context, where substitutions have occurred.

## 2023-09-15 ENCOUNTER — OFFICE VISIT (OUTPATIENT)
Dept: PODIATRY | Facility: CLINIC | Age: 63
End: 2023-09-15
Payer: MEDICARE

## 2023-09-15 VITALS
HEIGHT: 73 IN | BODY MASS INDEX: 40.82 KG/M2 | WEIGHT: 308 LBS | DIASTOLIC BLOOD PRESSURE: 72 MMHG | SYSTOLIC BLOOD PRESSURE: 120 MMHG

## 2023-09-15 DIAGNOSIS — Z79.4 TYPE 2 DIABETES MELLITUS WITH DIABETIC NEUROPATHY, WITH LONG-TERM CURRENT USE OF INSULIN (HCC): ICD-10-CM

## 2023-09-15 DIAGNOSIS — B35.1 ONYCHOMYCOSIS: Primary | ICD-10-CM

## 2023-09-15 DIAGNOSIS — E11.40 TYPE 2 DIABETES MELLITUS WITH DIABETIC NEUROPATHY, WITH LONG-TERM CURRENT USE OF INSULIN (HCC): ICD-10-CM

## 2023-09-15 PROCEDURE — 99213 OFFICE O/P EST LOW 20 MIN: CPT | Performed by: PODIATRIST

## 2023-09-15 PROCEDURE — 11721 DEBRIDE NAIL 6 OR MORE: CPT | Performed by: PODIATRIST

## 2023-09-19 ENCOUNTER — OFFICE VISIT (OUTPATIENT)
Dept: FAMILY MEDICINE CLINIC | Facility: HOSPITAL | Age: 63
End: 2023-09-19
Payer: MEDICARE

## 2023-09-19 VITALS
TEMPERATURE: 97.3 F | DIASTOLIC BLOOD PRESSURE: 74 MMHG | SYSTOLIC BLOOD PRESSURE: 124 MMHG | HEART RATE: 61 BPM | HEIGHT: 73 IN | OXYGEN SATURATION: 94 % | WEIGHT: 315 LBS | BODY MASS INDEX: 41.75 KG/M2

## 2023-09-19 DIAGNOSIS — N40.1 BENIGN PROSTATIC HYPERPLASIA (BPH) WITH STRAINING ON URINATION: ICD-10-CM

## 2023-09-19 DIAGNOSIS — R11.0 NAUSEA: ICD-10-CM

## 2023-09-19 DIAGNOSIS — R39.16 BENIGN PROSTATIC HYPERPLASIA (BPH) WITH STRAINING ON URINATION: ICD-10-CM

## 2023-09-19 DIAGNOSIS — G89.4 CHRONIC PAIN DISORDER: ICD-10-CM

## 2023-09-19 DIAGNOSIS — G43.709 CHRONIC MIGRAINE WITHOUT AURA WITHOUT STATUS MIGRAINOSUS, NOT INTRACTABLE: ICD-10-CM

## 2023-09-19 DIAGNOSIS — R13.10 DYSPHAGIA, UNSPECIFIED TYPE: ICD-10-CM

## 2023-09-19 DIAGNOSIS — F33.1 MAJOR DEPRESSIVE DISORDER, RECURRENT, MODERATE (HCC): Chronic | ICD-10-CM

## 2023-09-19 DIAGNOSIS — F11.20 OPIOID DEPENDENCE, CONTINUOUS (HCC): ICD-10-CM

## 2023-09-19 DIAGNOSIS — G47.33 OBSTRUCTIVE SLEEP APNEA SYNDROME: ICD-10-CM

## 2023-09-19 DIAGNOSIS — Z00.00 MEDICARE ANNUAL WELLNESS VISIT, SUBSEQUENT: Primary | ICD-10-CM

## 2023-09-19 DIAGNOSIS — K21.9 GASTROESOPHAGEAL REFLUX DISEASE WITHOUT ESOPHAGITIS: ICD-10-CM

## 2023-09-19 DIAGNOSIS — J44.9 CHRONIC OBSTRUCTIVE PULMONARY DISEASE, UNSPECIFIED COPD TYPE (HCC): ICD-10-CM

## 2023-09-19 DIAGNOSIS — F51.04 PSYCHOPHYSIOLOGICAL INSOMNIA: ICD-10-CM

## 2023-09-19 DIAGNOSIS — M54.17 RADICULOPATHY, LUMBOSACRAL REGION: ICD-10-CM

## 2023-09-19 PROBLEM — M25.562 PAIN AND SWELLING OF LEFT KNEE: Status: RESOLVED | Noted: 2019-05-05 | Resolved: 2023-09-19

## 2023-09-19 PROBLEM — L89.159 PRESSURE INJURY OF SKIN OF SACRAL REGION: Status: RESOLVED | Noted: 2019-06-03 | Resolved: 2023-09-19

## 2023-09-19 PROBLEM — M25.462 PAIN AND SWELLING OF LEFT KNEE: Status: RESOLVED | Noted: 2019-05-05 | Resolved: 2023-09-19
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
## 2023-09-19 PROBLEM — T85.610A MALFUNCTION OF INTRATHECAL INFUSION PUMP (HCC): Status: RESOLVED | Noted: 2020-05-08 | Resolved: 2023-09-19

## 2023-09-19 PROBLEM — T85.615A MALFUNCTION OF INTRATHECAL INFUSION PUMP (HCC): Status: RESOLVED | Noted: 2020-05-08 | Resolved: 2023-09-19

## 2023-09-19 PROBLEM — J96.10 CHRONIC RESPIRATORY FAILURE (HCC): Status: RESOLVED | Noted: 2017-09-05 | Resolved: 2023-09-19

## 2023-09-19 PROBLEM — E11.40 TYPE 2 DIABETES MELLITUS WITH DIABETIC NEUROPATHY, WITH LONG-TERM CURRENT USE OF INSULIN (HCC): Status: RESOLVED | Noted: 2019-10-18 | Resolved: 2023-09-19

## 2023-09-19 PROBLEM — Z79.4 TYPE 2 DIABETES MELLITUS WITH DIABETIC NEUROPATHY, WITH LONG-TERM CURRENT USE OF INSULIN (HCC): Status: RESOLVED | Noted: 2019-10-18 | Resolved: 2023-09-19

## 2023-09-19 LAB — SL AMB POCT HEMOGLOBIN AIC: 5.8 (ref ?–6.5)

## 2023-09-19 PROCEDURE — 83036 HEMOGLOBIN GLYCOSYLATED A1C: CPT | Performed by: NURSE PRACTITIONER

## 2023-09-19 PROCEDURE — G0438 PPPS, INITIAL VISIT: HCPCS | Performed by: NURSE PRACTITIONER

## 2023-09-19 PROCEDURE — 99204 OFFICE O/P NEW MOD 45 MIN: CPT | Performed by: NURSE PRACTITIONER

## 2023-09-19 RX ORDER — TRAZODONE HYDROCHLORIDE 50 MG/1
50 TABLET ORAL 2 TIMES DAILY
Qty: 90 TABLET | Refills: 1 | Status: SHIPPED | OUTPATIENT
Start: 2023-09-19

## 2023-09-19 RX ORDER — ONDANSETRON 4 MG/1
4 TABLET, FILM COATED ORAL EVERY 8 HOURS PRN
Qty: 60 TABLET | Refills: 3 | Status: SHIPPED | OUTPATIENT
Start: 2023-09-19

## 2023-09-19 RX ORDER — OMEPRAZOLE 40 MG/1
40 CAPSULE, DELAYED RELEASE ORAL DAILY
Qty: 90 CAPSULE | Refills: 1 | Status: SHIPPED | OUTPATIENT
Start: 2023-09-19 | End: 2023-09-27 | Stop reason: SDUPTHER

## 2023-09-19 RX ORDER — FLUTICASONE PROPIONATE AND SALMETEROL 500; 50 UG/1; UG/1
1 POWDER RESPIRATORY (INHALATION) 2 TIMES DAILY
Qty: 60 BLISTER | Refills: 1 | Status: SHIPPED | OUTPATIENT
Start: 2023-09-19

## 2023-09-19 RX ORDER — METHOCARBAMOL 750 MG/1
750 TABLET, FILM COATED ORAL EVERY 6 HOURS PRN
Qty: 28 TABLET | Refills: 0 | Status: SHIPPED | OUTPATIENT
Start: 2023-09-19

## 2023-09-19 RX ORDER — ALBUTEROL SULFATE 1.25 MG/3ML
1 SOLUTION RESPIRATORY (INHALATION) EVERY 6 HOURS PRN
Qty: 30 ML | Refills: 1 | Status: SHIPPED | OUTPATIENT
Start: 2023-09-19 | End: 2023-09-19 | Stop reason: SDUPTHER

## 2023-09-19 RX ORDER — GABAPENTIN 300 MG/1
300 CAPSULE ORAL AS NEEDED
Qty: 90 CAPSULE | Refills: 1 | Status: SHIPPED | OUTPATIENT
Start: 2023-09-19 | End: 2023-09-19

## 2023-09-19 RX ORDER — TAMSULOSIN HYDROCHLORIDE 0.4 MG/1
0.4 CAPSULE ORAL
Qty: 30 CAPSULE | Refills: 1 | Status: SHIPPED | OUTPATIENT
Start: 2023-09-19

## 2023-09-19 RX ORDER — ALBUTEROL SULFATE 1.25 MG/3ML
1 SOLUTION RESPIRATORY (INHALATION) EVERY 6 HOURS PRN
Qty: 360 ML | Refills: 1 | Status: SHIPPED | OUTPATIENT
Start: 2023-09-19

## 2023-09-19 NOTE — PATIENT INSTRUCTIONS
Medicare Preventive Visit Patient Instructions  Thank you for completing your Welcome to Medicare Visit or Medicare Annual Wellness Visit today. Your next wellness visit will be due in one year (9/19/2024). The screening/preventive services that you may require over the next 5-10 years are detailed below. Some tests may not apply to you based off risk factors and/or age. Screening tests ordered at today's visit but not completed yet may show as past due. Also, please note that scanned in results may not display below. Preventive Screenings:  Service Recommendations Previous Testing/Comments   Colorectal Cancer Screening  · Colonoscopy    · Fecal Occult Blood Test (FOBT)/Fecal Immunochemical Test (FIT)  · Fecal DNA/Cologuard Test  · Flexible Sigmoidoscopy Age: 43-73 years old   Colonoscopy: every 10 years (May be performed more frequently if at higher risk)  OR  FOBT/FIT: every 1 year  OR  Cologuard: every 3 years  OR  Sigmoidoscopy: every 5 years  Screening may be recommended earlier than age 39 if at higher risk for colorectal cancer. Also, an individualized decision between you and your healthcare provider will decide whether screening between the ages of 77-80 would be appropriate.  Colonoscopy: 07/06/2017  FOBT/FIT: Not on file  Cologuard: Not on file  Sigmoidoscopy: Not on file    Screening Current     Prostate Cancer Screening Individualized decision between patient and health care provider in men between ages of 53-66   Medicare will cover every 12 months beginning on the day after your 50th birthday PSA: No results in last 5 years           Hepatitis C Screening Once for adults born between 1945 and 1965  More frequently in patients at high risk for Hepatitis C Hep C Antibody: 01/06/2017    Screening Current   Diabetes Screening 1-2 times per year if you're at risk for diabetes or have pre-diabetes Fasting glucose: 88 mg/dL (3/3/2021)  A1C: 5.3 % (7/14/2022)  Screening Not Indicated  History Diabetes Cholesterol Screening Once every 5 years if you don't have a lipid disorder. May order more often based on risk factors. Lipid panel: 08/19/2023  Screening Not Indicated  History Lipid Disorder      Other Preventive Screenings Covered by Medicare:  1. Abdominal Aortic Aneurysm (AAA) Screening: covered once if your at risk. You're considered to be at risk if you have a family history of AAA or a male between the age of 70-76 who smoking at least 100 cigarettes in your lifetime. 2. Lung Cancer Screening: covers low dose CT scan once per year if you meet all of the following conditions: (1) Age 48-67; (2) No signs or symptoms of lung cancer; (3) Current smoker or have quit smoking within the last 15 years; (4) You have a tobacco smoking history of at least 20 pack years (packs per day x number of years you smoked); (5) You get a written order from a healthcare provider. 3. Glaucoma Screening: covered annually if you're considered high risk: (1) You have diabetes OR (2) Family history of glaucoma OR (3)  aged 48 and older OR (3)  American aged 72 and older  3. Osteoporosis Screening: covered every 2 years if you meet one of the following conditions: (1) Have a vertebral abnormality; (2) On glucocorticoid therapy for more than 3 months; (3) Have primary hyperparathyroidism; (4) On osteoporosis medications and need to assess response to drug therapy. 5. HIV Screening: covered annually if you're between the age of 14-79. Also covered annually if you are younger than 13 and older than 72 with risk factors for HIV infection. For pregnant patients, it is covered up to 3 times per pregnancy.     Immunizations:  Immunization Recommendations   Influenza Vaccine Annual influenza vaccination during flu season is recommended for all persons aged >= 6 months who do not have contraindications   Pneumococcal Vaccine   * Pneumococcal conjugate vaccine = PCV13 (Prevnar 13), PCV15 (Vaxneuvance), PCV20 (Prevnar 20)  * Pneumococcal polysaccharide vaccine = PPSV23 (Pneumovax) Adults 2364 years old: 1-3 doses may be recommended based on certain risk factors  Adults 72 years old: 1-2 doses may be recommended based off what pneumonia vaccine you previously received   Hepatitis B Vaccine 3 dose series if at intermediate or high risk (ex: diabetes, end stage renal disease, liver disease)   Tetanus (Td) Vaccine - COST NOT COVERED BY MEDICARE PART B Following completion of primary series, a booster dose should be given every 10 years to maintain immunity against tetanus. Td may also be given as tetanus wound prophylaxis. Tdap Vaccine - COST NOT COVERED BY MEDICARE PART B Recommended at least once for all adults. For pregnant patients, recommended with each pregnancy. Shingles Vaccine (Shingrix) - COST NOT COVERED BY MEDICARE PART B  2 shot series recommended in those aged 48 and above     Health Maintenance Due:      Topic Date Due   • HIV Screening  Never done   • Colorectal Cancer Screening  Never done   • Hepatitis C Screening  Completed     Immunizations Due:      Topic Date Due   • Hepatitis A Vaccine (1 of 2 - Risk 2-dose series) Never done   • COVID-19 Vaccine (3 - Moderna series) 05/25/2021   • Hepatitis B Vaccine (1 of 3 - Risk 3-dose series) Never done   • Influenza Vaccine (1) 09/01/2023     Advance Directives   What are advance directives? Advance directives are legal documents that state your wishes and plans for medical care. These plans are made ahead of time in case you lose your ability to make decisions for yourself. Advance directives can apply to any medical decision, such as the treatments you want, and if you want to donate organs. What are the types of advance directives? There are many types of advance directives, and each state has rules about how to use them. You may choose a combination of any of the following:  · Living will: This is a written record of the treatment you want.  You can also choose which treatments you do not want, which to limit, and which to stop at a certain time. This includes surgery, medicine, IV fluid, and tube feedings. · Durable power of  for healthcare [facility]): This is a written record that states who you want to make healthcare choices for you when you are unable to make them for yourself. This person, called a proxy, is usually a family member or a friend. You may choose more than 1 proxy. · Do not resuscitate (DNR) order:  A DNR order is used in case your heart stops beating or you stop breathing. It is a request not to have certain forms of treatment, such as CPR. A DNR order may be included in other types of advance directives. · Medical directive: This covers the care that you want if you are in a coma, near death, or unable to make decisions for yourself. You can list the treatments you want for each condition. Treatment may include pain medicine, surgery, blood transfusions, dialysis, IV or tube feedings, and a ventilator (breathing machine). · Values history: This document has questions about your views, beliefs, and how you feel and think about life. This information can help others choose the care that you would choose. Why are advance directives important? An advance directive helps you control your care. Although spoken wishes may be used, it is better to have your wishes written down. Spoken wishes can be misunderstood, or not followed. Treatments may be given even if you do not want them. An advance directive may make it easier for your family to make difficult choices about your care. Weight Management   Why it is important to manage your weight:  Being overweight increases your risk of health conditions such as heart disease, high blood pressure, type 2 diabetes, and certain types of cancer. It can also increase your risk for osteoarthritis, sleep apnea, and other respiratory problems. Aim for a slow, steady weight loss.  Even a small amount of weight loss can lower your risk of health problems. How to lose weight safely:  A safe and healthy way to lose weight is to eat fewer calories and get regular exercise. You can lose up about 1 pound a week by decreasing the number of calories you eat by 500 calories each day. Healthy meal plan for weight management:  A healthy meal plan includes a variety of foods, contains fewer calories, and helps you stay healthy. A healthy meal plan includes the following:  · Eat whole-grain foods more often. A healthy meal plan should contain fiber. Fiber is the part of grains, fruits, and vegetables that is not broken down by your body. Whole-grain foods are healthy and provide extra fiber in your diet. Some examples of whole-grain foods are whole-wheat breads and pastas, oatmeal, brown rice, and bulgur. · Eat a variety of vegetables every day. Include dark, leafy greens such as spinach, kale, david greens, and mustard greens. Eat yellow and orange vegetables such as carrots, sweet potatoes, and winter squash. · Eat a variety of fruits every day. Choose fresh or canned fruit (canned in its own juice or light syrup) instead of juice. Fruit juice has very little or no fiber. · Eat low-fat dairy foods. Drink fat-free (skim) milk or 1% milk. Eat fat-free yogurt and low-fat cottage cheese. Try low-fat cheeses such as mozzarella and other reduced-fat cheeses. · Choose meat and other protein foods that are low in fat. Choose beans or other legumes such as split peas or lentils. Choose fish, skinless poultry (chicken or turkey), or lean cuts of red meat (beef or pork). Before you cook meat or poultry, cut off any visible fat. · Use less fat and oil. Try baking foods instead of frying them. Add less fat, such as margarine, sour cream, regular salad dressing and mayonnaise to foods. Eat fewer high-fat foods. Some examples of high-fat foods include french fries, doughnuts, ice cream, and cakes. · Eat fewer sweets. Limit foods and drinks that are high in sugar. This includes candy, cookies, regular soda, and sweetened drinks. Exercise:  Exercise at least 30 minutes per day on most days of the week. Some examples of exercise include walking, biking, dancing, and swimming. You can also fit in more physical activity by taking the stairs instead of the elevator or parking farther away from stores. Ask your healthcare provider about the best exercise plan for you. Narcotic (Opioid) Safety    Use narcotics safely:  · Take prescribed narcotics exactly as directed  · Do not give narcotics to others or take narcotics that belong to someone else  · Do not mix narcotics without medicines or alcohol  · Do not drive or operate heavy machinery after you take the narcotic  · Monitor for side effects and notify your healthcare provider if you experienced side effects such as nausea, sleepiness, itching, or trouble thinking clearly. Manage constipation:    Constipation is the most common side effect of narcotic medicine. Constipation is when you have hard, dry bowel movements, or you go longer than usual between bowel movements. Tell your healthcare provider about all changes in your bowel movements while you are taking narcotics. He or she may recommend laxative medicine to help you have a bowel movement. He or she may also change the kind of narcotic you are taking, or change when you take it. The following are more ways you can prevent or relieve constipation:    · Drink liquids as directed. You may need to drink extra liquids to help soften and move your bowels. Ask how much liquid to drink each day and which liquids are best for you. · Eat high-fiber foods. This may help decrease constipation by adding bulk to your bowel movements. High-fiber foods include fruits, vegetables, whole-grain breads and cereals, and beans. Your healthcare provider or dietitian can help you create a high-fiber meal plan.  Your provider may also recommend a fiber supplement if you cannot get enough fiber from food. · Exercise regularly. Regular physical activity can help stimulate your intestines. Walking is a good exercise to prevent or relieve constipation. Ask which exercises are best for you. · Schedule a time each day to have a bowel movement. This may help train your body to have regular bowel movements. Bend forward while you are on the toilet to help move the bowel movement out. Sit on the toilet for at least 10 minutes, even if you do not have a bowel movement. Store narcotics safely:   · Store narcotics where others cannot easily get them. Keep them in a locked cabinet or secure area. Do not  keep them in a purse or other bag you carry with you. A person may be looking for something else and find the narcotics. · Make sure narcotics are stored out of the reach of children. A child can easily overdose on narcotics. Narcotics may look like candy to a small child. The best way to dispose of narcotics: The laws vary by country and area. In the [facility], the best way is to return the narcotics through a take-back program. This program is offered by the [employer]). The following are options for using the program:  · Take the narcotics to a [facility] collection site. The site is often a law enforcement center. Call your local law enforcement center for scheduled take-back days in your area. You will be given information on where to go if the collection site is in a different location. · Take the narcotics to an approved pharmacy or hospital.  A pharmacy or hospital may be set up as a collection site. You will need to ask if it is a [facility] collection site if you were not directed there. A pharmacy or doctor's office may not be able to take back narcotics unless it is a [employer] site. · Use a mail-back system. This means you are given containers to put the narcotics into. You will then mail them in the containers.   · Use a take-back drop box. This is a place to leave the narcotics at any time. People and animals will not be able to get into the box. Your local law enforcement agency can tell you where to find a drop box in your area. Other ways to manage pain:   · Ask your healthcare provider about non-narcotic medicines to control pain. Nonprescription medicines include NSAIDs (such as ibuprofen) and acetaminophen. Prescription medicines include muscle relaxers, antidepressants, and steroids. · Pain may be managed without any medicines. Some ways to relieve pain include massage, aromatherapy, or meditation. Physical or occupational therapy may also help. For more information:   · Drug Enforcement Administration  [address] [facility] [address] , [address]  Phone: 7- 421 - 403-5547  Web Address: [employer]/drug_disposal/    · [address] and Drug Administration  [address] , [address]  Phone: 0- 173 - 377-4508  Web Address: http://edjing/     © Copyright [address] 2018 Information is for End User's use only and may not be sold, redistributed or otherwise used for commercial purposes. All illustrations and images included in CareNotes® are the copyrighted property of [employer] or [facility] Preventive Visit Patient Instructions  Thank you for completing your Welcome to Medicare Visit or Medicare Annual Wellness Visit today. Your next wellness visit will be due in one year (9/19/2024). The screening/preventive services that you may require over the next 5-10 years are detailed below. Some tests may not apply to you based off risk factors and/or age. Screening tests ordered at today's visit but not completed yet may show as past due. Also, please note that scanned in results may not display below.   Preventive Screenings:  Service Recommendations Previous Testing/Comments   Colorectal Cancer Screening  · Colonoscopy    · Fecal Occult Blood Test (FOBT)/Fecal Immunochemical Test (FIT)  · Fecal DNA/Cologuard Test  · Flexible Sigmoidoscopy Age: 43-73 years old   Colonoscopy: every 10 years (May be performed more frequently if at higher risk)  OR  FOBT/FIT: every 1 year  OR  Cologuard: every 3 years  OR  Sigmoidoscopy: every 5 years  Screening may be recommended earlier than age 39 if at higher risk for colorectal cancer. Also, an individualized decision between you and your healthcare provider will decide whether screening between the ages of 77-80 would be appropriate. Colonoscopy: 07/06/2017  FOBT/FIT: Not on file  Cologuard: Not on file  Sigmoidoscopy: Not on file    Screening Current     Prostate Cancer Screening Individualized decision between patient and health care provider in men between ages of 53-66   Medicare will cover every 12 months beginning on the day after your 50th birthday PSA: No results in last 5 years           Hepatitis C Screening Once for adults born between 1945 and 1965  More frequently in patients at high risk for Hepatitis C Hep C Antibody: 01/06/2017    Screening Current   Diabetes Screening 1-2 times per year if you're at risk for diabetes or have pre-diabetes Fasting glucose: 88 mg/dL (3/3/2021)  A1C: 5.3 % (7/14/2022)  Screening Not Indicated  History Diabetes   Cholesterol Screening Once every 5 years if you don't have a lipid disorder. May order more often based on risk factors. Lipid panel: 08/19/2023  Screening Not Indicated  History Lipid Disorder      Other Preventive Screenings Covered by Medicare:  6. Abdominal Aortic Aneurysm (AAA) Screening: covered once if your at risk. You're considered to be at risk if you have a family history of AAA or a male between the age of 70-76 who smoking at least 100 cigarettes in your lifetime.   7. Lung Cancer Screening: covers low dose CT scan once per year if you meet all of the following conditions: (1) Age 48-67; (2) No signs or symptoms of lung cancer; (3) Current smoker or have quit smoking within the last 15 years; (4) You have a tobacco smoking history of at least 20 pack years (packs per day x number of years you smoked); (5) You get a written order from a healthcare provider. 8. Glaucoma Screening: covered annually if you're considered high risk: (1) You have diabetes OR (2) Family history of glaucoma OR (3)  aged 48 and older OR (3)  American aged 72 and older  5. Osteoporosis Screening: covered every 2 years if you meet one of the following conditions: (1) Have a vertebral abnormality; (2) On glucocorticoid therapy for more than 3 months; (3) Have primary hyperparathyroidism; (4) On osteoporosis medications and need to assess response to drug therapy. 10. HIV Screening: covered annually if you're between the age of 14-79. Also covered annually if you are younger than 13 and older than 72 with risk factors for HIV infection. For pregnant patients, it is covered up to 3 times per pregnancy. Immunizations:  Immunization Recommendations   Influenza Vaccine Annual influenza vaccination during flu season is recommended for all persons aged >= 6 months who do not have contraindications   Pneumococcal Vaccine   * Pneumococcal conjugate vaccine = PCV13 (Prevnar 13), PCV15 (Vaxneuvance), PCV20 (Prevnar 20)  * Pneumococcal polysaccharide vaccine = PPSV23 (Pneumovax) Adults 20-63 years old: 1-3 doses may be recommended based on certain risk factors  Adults 72 years old: 1-2 doses may be recommended based off what pneumonia vaccine you previously received   Hepatitis B Vaccine 3 dose series if at intermediate or high risk (ex: diabetes, end stage renal disease, liver disease)   Tetanus (Td) Vaccine - COST NOT COVERED BY MEDICARE PART B Following completion of primary series, a booster dose should be given every 10 years to maintain immunity against tetanus. Td may also be given as tetanus wound prophylaxis.    Tdap Vaccine - COST NOT COVERED BY MEDICARE PART B Recommended at least once for all adults. For pregnant patients, recommended with each pregnancy. Shingles Vaccine (Shingrix) - COST NOT COVERED BY MEDICARE PART B  2 shot series recommended in those aged 48 and above     Health Maintenance Due:      Topic Date Due   • HIV Screening  Never done   • Colorectal Cancer Screening  Never done   • Hepatitis C Screening  Completed     Immunizations Due:      Topic Date Due   • Hepatitis A Vaccine (1 of 2 - Risk 2-dose series) Never done   • COVID-19 Vaccine (3 - Moderna series) 05/25/2021   • Hepatitis B Vaccine (1 of 3 - Risk 3-dose series) Never done   • Influenza Vaccine (1) 09/01/2023     Advance Directives   What are advance directives? Advance directives are legal documents that state your wishes and plans for medical care. These plans are made ahead of time in case you lose your ability to make decisions for yourself. Advance directives can apply to any medical decision, such as the treatments you want, and if you want to donate organs. What are the types of advance directives? There are many types of advance directives, and each state has rules about how to use them. You may choose a combination of any of the following:  · Living will: This is a written record of the treatment you want. You can also choose which treatments you do not want, which to limit, and which to stop at a certain time. This includes surgery, medicine, IV fluid, and tube feedings. · Durable power of  for healthcare [facility]): This is a written record that states who you want to make healthcare choices for you when you are unable to make them for yourself. This person, called a proxy, is usually a family member or a friend. You may choose more than 1 proxy. · Do not resuscitate (DNR) order:  A DNR order is used in case your heart stops beating or you stop breathing. It is a request not to have certain forms of treatment, such as CPR.  A DNR order may be included in other types of advance directives. · Medical directive: This covers the care that you want if you are in a coma, near death, or unable to make decisions for yourself. You can list the treatments you want for each condition. Treatment may include pain medicine, surgery, blood transfusions, dialysis, IV or tube feedings, and a ventilator (breathing machine). · Values history: This document has questions about your views, beliefs, and how you feel and think about life. This information can help others choose the care that you would choose. Why are advance directives important? An advance directive helps you control your care. Although spoken wishes may be used, it is better to have your wishes written down. Spoken wishes can be misunderstood, or not followed. Treatments may be given even if you do not want them. An advance directive may make it easier for your family to make difficult choices about your care. Weight Management   Why it is important to manage your weight:  Being overweight increases your risk of health conditions such as heart disease, high blood pressure, type 2 diabetes, and certain types of cancer. It can also increase your risk for osteoarthritis, sleep apnea, and other respiratory problems. Aim for a slow, steady weight loss. Even a small amount of weight loss can lower your risk of health problems. How to lose weight safely:  A safe and healthy way to lose weight is to eat fewer calories and get regular exercise. You can lose up about 1 pound a week by decreasing the number of calories you eat by 500 calories each day. Healthy meal plan for weight management:  A healthy meal plan includes a variety of foods, contains fewer calories, and helps you stay healthy. A healthy meal plan includes the following:  · Eat whole-grain foods more often. A healthy meal plan should contain fiber. Fiber is the part of grains, fruits, and vegetables that is not broken down by your body.  Whole-grain foods are healthy and provide extra fiber in your diet. Some examples of whole-grain foods are whole-wheat breads and pastas, oatmeal, brown rice, and bulgur. · Eat a variety of vegetables every day. Include dark, leafy greens such as spinach, kale, david greens, and mustard greens. Eat yellow and orange vegetables such as carrots, sweet potatoes, and winter squash. · Eat a variety of fruits every day. Choose fresh or canned fruit (canned in its own juice or light syrup) instead of juice. Fruit juice has very little or no fiber. · Eat low-fat dairy foods. Drink fat-free (skim) milk or 1% milk. Eat fat-free yogurt and low-fat cottage cheese. Try low-fat cheeses such as mozzarella and other reduced-fat cheeses. · Choose meat and other protein foods that are low in fat. Choose beans or other legumes such as split peas or lentils. Choose fish, skinless poultry (chicken or turkey), or lean cuts of red meat (beef or pork). Before you cook meat or poultry, cut off any visible fat. · Use less fat and oil. Try baking foods instead of frying them. Add less fat, such as margarine, sour cream, regular salad dressing and mayonnaise to foods. Eat fewer high-fat foods. Some examples of high-fat foods include french fries, doughnuts, ice cream, and cakes. · Eat fewer sweets. Limit foods and drinks that are high in sugar. This includes candy, cookies, regular soda, and sweetened drinks. Exercise:  Exercise at least 30 minutes per day on most days of the week. Some examples of exercise include walking, biking, dancing, and swimming. You can also fit in more physical activity by taking the stairs instead of the elevator or parking farther away from stores. Ask your healthcare provider about the best exercise plan for you.    Narcotic (Opioid) Safety    Use narcotics safely:  · Take prescribed narcotics exactly as directed  · Do not give narcotics to others or take narcotics that belong to someone else  · Do not mix narcotics without medicines or alcohol  · Do not drive or operate heavy machinery after you take the narcotic  · Monitor for side effects and notify your healthcare provider if you experienced side effects such as nausea, sleepiness, itching, or trouble thinking clearly. Manage constipation:    Constipation is the most common side effect of narcotic medicine. Constipation is when you have hard, dry bowel movements, or you go longer than usual between bowel movements. Tell your healthcare provider about all changes in your bowel movements while you are taking narcotics. He or she may recommend laxative medicine to help you have a bowel movement. He or she may also change the kind of narcotic you are taking, or change when you take it. The following are more ways you can prevent or relieve constipation:    · Drink liquids as directed. You may need to drink extra liquids to help soften and move your bowels. Ask how much liquid to drink each day and which liquids are best for you. · Eat high-fiber foods. This may help decrease constipation by adding bulk to your bowel movements. High-fiber foods include fruits, vegetables, whole-grain breads and cereals, and beans. Your healthcare provider or dietitian can help you create a high-fiber meal plan. Your provider may also recommend a fiber supplement if you cannot get enough fiber from food. · Exercise regularly. Regular physical activity can help stimulate your intestines. Walking is a good exercise to prevent or relieve constipation. Ask which exercises are best for you. · Schedule a time each day to have a bowel movement. This may help train your body to have regular bowel movements. Bend forward while you are on the toilet to help move the bowel movement out. Sit on the toilet for at least 10 minutes, even if you do not have a bowel movement. Store narcotics safely:   · Store narcotics where others cannot easily get them.   Keep them in a locked cabinet or secure area. Do not  keep them in a purse or other bag you carry with you. A person may be looking for something else and find the narcotics. · Make sure narcotics are stored out of the reach of children. A child can easily overdose on narcotics. Narcotics may look like candy to a small child. The best way to dispose of narcotics: The laws vary by country and area. In the [facility], the best way is to return the narcotics through a take-back program. This program is offered by the [employer] The following are options for using the program:  · Take the narcotics to a [facility] collection site. The site is often a law enforcement center. Call your local law enforcement center for scheduled take-back days in your area. You will be given information on where to go if the collection site is in a different location. · Take the narcotics to an approved pharmacy or hospital.  A pharmacy or hospital may be set up as a collection site. You will need to ask if it is a [facility] collection site if you were not directed there. A pharmacy or doctor's office may not be able to take back narcotics unless it is a [facility] site. · Use a mail-back system. This means you are given containers to put the narcotics into. You will then mail them in the containers. · Use a take-back drop box. This is a place to leave the narcotics at any time. People and animals will not be able to get into the box. Your local law enforcement agency can tell you where to find a drop box in your area. Other ways to manage pain:   · Ask your healthcare provider about non-narcotic medicines to control pain. Nonprescription medicines include NSAIDs (such as ibuprofen) and acetaminophen. Prescription medicines include muscle relaxers, antidepressants, and steroids. · Pain may be managed without any medicines. Some ways to relieve pain include massage, aromatherapy, or meditation. Physical or occupational therapy may also help.     For more information:   · Drug Enforcement Administration  [address] , [address]  Phone: 2- 990 - [phone]  Web Address: [facility] [employer]gov/drug_disposal/    · [address] [facility] [address] and Drug Administration  [address] , [address]  Phone: 5- 741 - [phone]  Web Address: http://T[employer].ly/     © Copyright [employer] 2018 Information is for End User's use only and may not be sold, redistributed or otherwise used for commercial purposes.  All illustrations and images included in CareNotes® are the copyrighted property of [employer] or [address] [place] [address]

## 2023-09-19 NOTE — ASSESSMENT & PLAN NOTE
H reports his mood is controlled. He is off medication other than Trazodone for insomnia. He has had previous psych admission.

## 2023-09-19 NOTE — PROGRESS NOTES
Assessment and Plan:     Problem List Items Addressed This Visit        Digestive    GERD (gastroesophageal reflux disease)    Relevant Medications    omeprazole (PriLOSEC) 40 MG capsule       Respiratory    Obstructive sleep apnea syndrome     Not using CPAP. Lost machine. Refer to pulm. COPD (chronic obstructive pulmonary disease) (HCC)     On Advair and albuterol. With ARLEEN not using CPAP. Refer to pulm. Refills issued. Relevant Medications    Fluticasone-Salmeterol (Advair Diskus) 500-50 mcg/dose inhaler    albuterol (ACCUNEB) 1.25 MG/3ML nebulizer solution    Other Relevant Orders    Ambulatory Referral to Pulmonology       Cardiovascular and Mediastinum    Chronic migraine without aura without status migrainosus, not intractable     Was being managed by previous PCP. He uses additional gabapentin to help with this. Refill issued. Relevant Medications    gabapentin (NEURONTIN) 300 mg capsule    methocarbamol (ROBAXIN) 750 mg tablet    traZODone (DESYREL) 50 mg tablet       Nervous and Auditory    Radiculopathy, lumbosacral region    Relevant Medications    methocarbamol (ROBAXIN) 750 mg tablet       Other    Major depressive disorder, recurrent, moderate (HCC) (Chronic)     H reports his mood is controlled. He is off medication other than Trazodone for insomnia. He has had previous psych admission. Relevant Medications    traZODone (DESYREL) 50 mg tablet    Chronic pain disorder     Intrathecal morphine. Managed by pain management. Opioid dependence, continuous ([address])     Opioid managed by pain management.          Other Visit Diagnoses     Medicare annual wellness visit, subsequent    -  Primary    Benign prostatic hyperplasia (BPH) with straining on urination        Relevant Medications    tamsulosin (Flomax) 0.4 mg    Other Relevant Orders    Ambulatory Referral to Urology    Psychophysiological insomnia        Relevant Medications    traZODone (DESYREL) 50 mg tablet    Dysphagia, unspecified type        Relevant Orders    Ambulatory Referral to Gastroenterology    Nausea        Relevant Medications    ondansetron (ZOFRAN) 4 mg tablet        BMI Counseling: Body mass index is 41.64 kg/m². The BMI is above normal. Nutrition recommendations include decreasing portion sizes, encouraging healthy choices of fruits and vegetables, decreasing fast food intake, consuming healthier snacks, limiting drinks that contain sugar, moderation in carbohydrate intake, increasing intake of lean protein, reducing intake of saturated and trans fat and reducing intake of cholesterol. Exercise recommendations include moderate physical activity 150 minutes/week. No pharmacotherapy was ordered. Rationale for BMI follow-up plan is due to patient being overweight or obese. Preventive health issues were discussed with patient, and age appropriate screening tests were ordered as noted in patient's After Visit Summary. Personalized health advice and appropriate referrals for health education or preventive services given if needed, as noted in patient's After Visit Summary. History of Present Illness:     Patient presents for a Medicare Wellness Visit    Chronic pain managed by DR. Kennedi Duncan. Intrathecal pain pump with morphine. Chronic neuropathy. Gabapentin prescribed by pain doctor. Reports he has esopahageal strictures-needs GI. States he needs stretching. Established with  GI. Needs someone closer/.  Reports urinating once/day. Feels like he has to go but can't. Strains to void. Used to see  urology years ago. Was diabetic, Was insulin dependent. Resolved after gastric sleeve. Last few A1C have been in five range. A1C in office today 5.8  COPD. Used to be O2 dependent. Not established with pulm. Has ARLEEN. Does not use CPAP. Lost it. H/O depression and anxiety. Previous psych admission. Reports he does not see psychiatrist. Uses trazodone for sleep.    H/o migraines. Used to see [facility] doctor. Used to get botox injections and additional 300 mg gabapentin. Gabapentin was prescribed by previous PCP. Recent hospitalization for chest pain and s/p cardiac cath w/o any lesions. Cardiomyopathy. Medical management. Saw cardiology last week. Patient Care Team:  Jonny Patrick as PCP - General (Family Medicine)  MD Chris Moon PA-C Cassell Frames, DO Beaver Draper, DO  MD Shari Deleon, DO     Review of Systems:     Review of Systems   Constitutional: Positive for unexpected weight change (Gaining weight). Respiratory: Positive for shortness of breath. Cardiovascular: Positive for leg swelling. Negative for chest pain. Genitourinary: Positive for decreased urine volume and difficulty urinating. Musculoskeletal: Positive for back pain (chronic). Neurological: Positive for weakness and numbness. Psychiatric/Behavioral: Positive for sleep disturbance. Negative for dysphoric mood. The patient is not nervous/anxious.          Problem List:     Patient Active Problem List   Diagnosis   • Chronic diastolic congestive heart failure (HCC)   • Coronary artery disease involving native coronary artery   • Morbid obesity ([address])   • CVA (cerebral vascular accident) ([address])   • Stroke ([address])   • Stented coronary artery   • Obstructive sleep apnea syndrome   • CPAP (continuous positive airway pressure) dependence   • Brain aneurysm   • Chronic pain disorder   • Other constipation   • Coronary artery disease   • Sleep apnea   • Bilateral leg edema   • Chest pain   • Stage 3 chronic kidney disease (HCC)   • GERD (gastroesophageal reflux disease)   • Opioid dependence, continuous ([facility])   • Esophageal dysphagia   • Chronic migraine without aura without status migrainosus, not intractable   • Hyperlipidemia   • Vitamin D deficiency   • Symptomatic bradycardia   • Bilateral foot pain   • Orthostatic hypotension   • Primary osteoarthritis of both knees   • Mild cognitive impairment   • COPD (chronic obstructive pulmonary disease) ([facility])   • Major depressive disorder, recurrent, moderate ([facility])   • Radiculopathy, lumbosacral region   • Neuropathy   • Presence of intrathecal pump   • Skin inflammation   • Status post insertion of spinal cord stimulator   • History of gastric sleeve procedure   • Myocarditis ([address])   • Arteriosclerosis of artery of extremity ([facility])      Past Medical and Surgical History:     Past Medical History:   Diagnosis Date   • Acute on chronic diastolic congestive heart failure ([facility])    • Altered gait    • Alzheimer disease ([facility])     per patients,,early onset    • Angina pectoris ([facility])    • Anxiety    • Arthritis    • Brain aneurysm     coils placed   • Cardiac disease    • Chest pain 1/13/2016   • Chronic kidney disease    • Chronic pain     back/ right groin and rle- has morphine pump   • Constipation    • COPD (chronic obstructive pulmonary disease) ([facility])    • Coronary artery disease    • CPAP (continuous positive airway pressure) dependence    • Decubital ulcer     sacral decub-occured 5/2019-sees wound care/debide in OR today 6/6/2019   • Dependent on walker for ambulation     w/c for long distance   • Depression    • Diabetes mellitus ([facility])     insulin dependent   • Dizziness     occ   • Dysphagia    • Enlarged prostate    • Fall    • Fall at home 5/3/2019   • GERD (gastroesophageal reflux disease)    • Heart failure ([address])    • Hiatal hernia    • Hx of gastric bypass 11/19/2018   • Hypercholesterolemia    • Hypertension    • MI (myocardial infarction) ([address])     2017- stents x2   • Migraine    • Morbid obesity ([address])     gastric bypass sleeve 11/2018-wt loss 125 lb   • Neuropathy    • Oxygen dependent     Q HS  2LPM with CPAP and prn during day 2-3 LPM    • Pressure injury of skin    • Pressure injury of skin of sacral region 6/3/2019    Added automatically from request for surgery 150000   • Renal disorder    • Shortness of breath    • Skin abnormality     sacral wound - covered with pad   • Sleep apnea    • Stented coronary artery    • Stroke [facility])     vision loss b/l  2005, residual R leg weakness   • Type 2 diabetes mellitus with diabetic neuropathy, with long-term current use of insulin ([address]) 10/18/2019   • Type 2 diabetes mellitus with renal complication (HCC)     insulin dependent   • Urinary frequency    • Use of cane as ambulatory aid    • Wears dentures    • Wears glasses    • Wears glasses    • Wheelchair dependent      Past Surgical History:   Procedure Laterality Date   • BACK SURGERY     • BRAIN SURGERY     • CARDIAC CATHETERIZATION      with stents   • CARDIAC CATHETERIZATION N/A 8/18/2023    Procedure: Cardiac Coronary Angiogram;  Surgeon: Dai Saez MD;  Location: [facility]; Service: Cardiology   • CEREBRAL ANEURYSM REPAIR      with coils   • COLONOSCOPY     • ESOPHAGOGASTRODUODENOSCOPY N/A 7/1/2016    Procedure: ESOPHAGOGASTRODUODENOSCOPY (EGD); Surgeon: Rafy Boyd MD;  Location: [facility] GI LAB; Service:    • GASTRIC BYPASS  11/19/2018   • HERNIA REPAIR     • HIATAL HERNIA REPAIR     • INFUSION PUMP IMPLANTATION Left     morphine   • INTRATHECAL PUMP IMPLANTATION Left 7/9/2020    Procedure: REVISION INTRATHECAL PAIN PUMP POCKET, LEFT ABDOMEN;  Surgeon: Rosalva Rai MD;  Location: [facility];  Service: Neurosurgery   • KNEE ARTHROSCOPY Right    • KNEE ARTHROSCOPY Right    • PERONEAL NERVE DECOMPRESSION Right    • MT DEBRIDEMENT OPEN WOUND 20 SQ CM/< N/A 6/6/2019    Procedure: EXCISIONAL DEBRIDEMENT OF SACRAL DECUBITUS ULCER;  Surgeon: Melody Ayala MD;  Location[facility];  Service: General   • MT ESOPHAGOGASTRODUODENOSCOPY TRANSORAL DIAGNOSTIC N/A 2/27/2017    Procedure: ESOPHAGOGASTRODUODENOSCOPY (EGD); Surgeon: Rafy Boyd MD;  Location: [facility] GI [facility];   Service: Gastroenterology   • MT ESOPHAGOGASTRODUODENOSCOPY TRANSORAL DIAGNOSTIC N/A 8/23/2018    Procedure: ESOPHAGOGASTRODUODENOSCOPY (EGD) with biopsy;  Surgeon: Gris Hdez MD;  Location: [facility];   Service: Gastroenterology   • SC IMPLTJ/RPLCMT ITHCL/EDRL DRUG NFS PRGRBL PUMP Left 10/13/2020    Procedure: EXPLORATION OF INTRATHECAL PAIN PUMP SYSTEM INTEGRITY FOR POSSIBLE REPLACEMENT OF CATHETER AND PUMP.;  Surgeon: Reid Reynaga MD;  Location[facility];  Service: Neurosurgery   • SC IMPLTJ/RPLCMT ITHCL/EDRL DRUG NFS SUBQ RSVR N/A 1/19/2017    Procedure: REMOVAL / Natalie Hernandez;  Surgeon: Peyton Chaudhary MD;  [facility];  Service: Orthopedics   • SC IMPLTJ/RPLCMT ITHCL/EDRL DRUG NFS SUBQ RSVR N/A 5/16/2016    Procedure: REPLACEMENT AND PROGRAM PUMP ;  Surgeon: Peyton Chaudhary MD;  [facility];  Service: Orthopedics   • SC PRQ IMPLTJ NSTIM ELECTRODE ARRAY EPIDURAL Right 3/17/2021    Procedure: INSERTION THORACIC DORSAL COLUMN SPINAL CORD STIMULATOR PERCUTANEOUS W IMPLANTABLE PULSE GENERATOR, RIGHT;  Surgeon: Reid Reynaga MD;  Location: [facility];  Service: Neurosurgery      Family History:     Family History   Problem Relation Age of Onset   • Diabetes unspecified Mother    • Diabetes unspecified Brother    • Diabetes unspecified Paternal Uncle    • Diabetes unspecified Maternal Grandmother    • Diabetes unspecified Paternal Grandmother    • Diabetes unspecified Brother    • Heart attack Father       Social History:     Social History     Socioeconomic History   • Marital status: /Civil Union     Spouse name: Abdirashid Niño   • Number of children: 5   • Years of education: None   • Highest education level: GED or equivalent   Occupational History   • None   Tobacco Use   • Smoking status: Never   • Smokeless tobacco: Never   Vaping Use   • Vaping Use: Never used   Substance and Sexual Activity   • Alcohol use: Not Currently   • Drug use: Not Currently     Types: Marijuana   • Sexual activity: Not Currently   Other Topics Concern   • None   Social History Narrative   • None     Social Determinants of Health Financial Resource Strain: High Risk (9/19/2023)    Overall Financial Resource Strain (CARDIA)    • Difficulty of Paying Living Expenses: Very hard   Food Insecurity: No Food Insecurity (8/25/2023)    Hunger Vital Sign    • Worried About Running Out of Food in the Last Year: Never true    • Ran Out of Food in the Last Year: Never true   Transportation Needs: No Transportation Needs (9/19/2023)    PRAPARE - Transportation    • Lack of Transportation (Medical): No    • Lack of Transportation (Non-Medical): No   Physical Activity: Inactive (9/6/2019)    Exercise Vital Sign    • Days of Exercise per Week: 0 days    • Minutes of Exercise per Session: 0 min   Stress: Stress Concern Present (9/6/2019)    [facility]    • Feeling of Stress : Very much   Social Connections:  Moderately Isolated (9/6/2019)    Social Connection and Isolation Panel [NHANES]    • Frequency of Communication with Friends and Family: Never    • Frequency of Social Gatherings with Friends and Family: Twice a week    • Attends [employer] Services: 1 to 4 times per year    • Active Member of Clubs or Organizations: No    • Attends Club or Organization Meetings: Never    • Marital Status:    Intimate Partner Violence: Not At Risk (9/6/2019)    Humiliation, Afraid, Rape, and Kick questionnaire    • Fear of Current or Ex-Partner: No    • Emotionally Abused: No    • Physically Abused: No    • Sexually Abused: No   Housing Stability: [address] [facility]  (8/25/2023)    Housing Stability Vital Sign    • Unable to Pay for Housing in the Last Year: No    • Number of Places Lived in the Last Year: 1    • Unstable Housing in the Last Year: No      Medications and Allergies:     Current Outpatient Medications   Medication Sig Dispense Refill   • albuterol (ACCUNEB) 1.25 MG/3ML nebulizer solution Take 3 mL (1.25 mg total) by nebulization every 6 (six) hours as needed for wheezing 360 mL 1   • amLODIPine (NORVASC) 5 mg tablet Take 1 tablet (5 mg total) by mouth daily 90 tablet 1   • aspirin 81 mg chewable tablet Chew 1 tablet (81 mg total) daily 90 tablet 1   • atorvastatin (LIPITOR) 80 mg tablet Take 1 tablet (80 mg total) by mouth daily after dinner 90 tablet 1   • baclofen 10 mg tablet Take 10 mg by mouth 3 (three) times a day     • CALCIUM PO Take 1 tablet by mouth daily     • carvedilol (COREG) 12.5 mg tablet Take 1 tablet (12.5 mg total) by mouth 2 (two) times a day with meals 60 tablet 3   • clopidogrel (PLAVIX) 75 mg tablet Take 1 tablet (75 mg total) by mouth daily 90 tablet 1   • Cyanocobalamin (VITAMIN B-12 PO) Take 1 tablet by mouth daily     • Fluticasone-Salmeterol (Advair Diskus) 500-50 mcg/dose inhaler Inhale 1 puff 2 (two) times a day 60 blister 1   • folic acid (FOLVITE) 1 mg tablet Take 1 mg by mouth daily   3   • furosemide (LASIX) 40 mg tablet Take 1 tablet (40 mg total) by mouth daily 30 tablet 3   • gabapentin (NEURONTIN) 300 mg capsule Take 1 capsule (300 mg total) by mouth as needed (migraine) 90 capsule 1   • gabapentin (NEURONTIN) 600 MG tablet Take 1,200 mg by mouth 2 (two) times a day     • hydrocortisone 1 % lotion APPLY TOPICALLY 2 (TWO) TIMES A DAY INDICATIONS  USING ON FEET. • isosorbide mononitrate (IMDUR) 60 mg 24 hr tablet Take 1 tablet (60 mg total) by mouth daily Do not start before August 22, 2023. 30 tablet 0   • lidocaine (LIDODERM) 5 %      • losartan (COZAAR) 50 mg tablet Take 1 tablet (50 mg total) by mouth daily 90 tablet 1   • methocarbamol (ROBAXIN) 750 mg tablet Take 1 tablet (750 mg total) by mouth every 6 (six) hours as needed for muscle spasms 28 tablet 0   • Morphine Sulfate Microinfusion (MORPHINE SULF MICROINFUSION PF IJ) Inject 14 mg as directed daily Via infusion pump     • nitroglycerin (NITROSTAT) 0.4 mg SL tablet Place 0.4 mg under the tongue every 5 (five) minutes as needed for chest pain (pt has not  used recently).        • nystatin (MYCOSTATIN) cream Apply 1 application topically 2 (two) times a day     • omeprazole (PriLOSEC) 40 MG capsule Take 1 capsule (40 mg total) by mouth daily 90 capsule 1   • ondansetron (ZOFRAN) 4 mg tablet Take 1 tablet (4 mg total) by mouth every 8 (eight) hours as needed for nausea or vomiting 60 tablet 3   • potassium chloride (K-DUR,KLOR-CON) 20 mEq tablet Take 1 tablet (20 mEq total) by mouth daily 30 tablet 3   • ranolazine (RANEXA) 1000 MG SR tablet Take 1 tablet (1,000 mg total) by mouth every 12 (twelve) hours 60 tablet 3   • tamsulosin (Flomax) 0.4 mg Take 1 capsule (0.4 mg total) by mouth daily with dinner 30 capsule 1   • traZODone (DESYREL) 50 mg tablet Take 1 tablet (50 mg total) by mouth 2 (two) times a day 90 tablet 1     No current facility-administered medications for this visit.      No Known Allergies   Immunizations:     Immunization History   Administered Date(s) Administered   • COVID-19 MODERNA VACC 0.5 ML IM 03/02/2021, 03/30/2021   • Fluzone Split Quad 0.5 mL 10/06/2016, 10/13/2017, 10/24/2018, 11/13/2019   • INFLUENZA 11/26/2014, 10/06/2016, 10/06/2016, 10/06/2016, 10/12/2017, 10/12/2017, 03/03/2018, 10/24/2018, 10/24/2018, 10/24/2018, 10/26/2018, 11/13/2019, 10/01/2020, 10/01/2020, 10/20/2022, 10/20/2022   • Influenza Quadrivalent Preservative Free 3 years and older IM 10/13/2017   • Influenza, seasonal, injectable 11/26/2012, 11/26/2014   • Pneumococcal Conjugate 13-Valent 10/13/2017   • Pneumococcal Polysaccharide PPV23 04/18/2017   • Tdap 08/02/2014, 02/15/2020, 01/21/2022, 01/21/2022      Health Maintenance:         Topic Date Due   • HIV Screening  Never done   • Colorectal Cancer Screening  Never done   • Hepatitis C Screening  Completed         Topic Date Due   • Hepatitis A Vaccine (1 of 2 - Risk 2-dose series) Never done   • COVID-19 Vaccine (3 - Moderna series) 05/25/2021   • Hepatitis B Vaccine (1 of 3 - Risk 3-dose series) Never done   • Influenza Vaccine (1) 09/01/2023 Medicare Screening Tests and Risk Assessments:     Fabricio Guallpa is here for his Subsequent Wellness visit. Health Risk Assessment:   Patient rates overall health as poor. Patient feels that their physical health rating is much worse. Patient is dissatisfied with their life. Eyesight was rated as same. Hearing was rated as same. Patient feels that their emotional and mental health rating is same. Patients states they are never, rarely angry. Patient states they are always unusually tired/fatigued. Pain experienced in the last 7 days has been a lot. Patient's pain rating has been 4/10. Patient states that he has experienced no weight loss or gain in last 6 months. Goes to pain management. Depression Screening:   PHQ-9 Score: 12      Fall Risk Screening: In the past year, patient has experienced: history of falling in past year    Number of falls: 2 or more  Injured during fall?: Yes    Feels unsteady when standing or walking?: No    Worried about falling?: No      Home Safety:  Patient has trouble with stairs inside or outside of their home. Patient has working smoke alarms and has no working carbon monoxide detector. Home safety hazards include: none. Nutrition:   Current diet is Regular. Medications:   Patient is not currently taking any over-the-counter supplements. Patient is able to manage medications. Activities of Daily Living (ADLs)/Instrumental Activities of Daily Living (IADLs):   Walk and transfer into and out of bed and chair?: Yes  Dress and groom yourself?: Yes    Bathe or shower yourself?: Yes    Feed yourself? Yes  Do your laundry/housekeeping?: No  Manage your money, pay your bills and track your expenses?: No  Make your own meals?: No    Do your own shopping?: No    ADL comments: Pt's spouse mangages house keeping, cooking bills.      Previous Hospitalizations:   Any hospitalizations or ED visits within the last 12 months?: Yes    How many hospitalizations have you had in the last year?: 3-4    Advance Care Planning:   Living will: No    Durable POA for healthcare: No    Advanced directive: No    Five wishes given: Yes      Cognitive Screening:   Provider or family/friend/caregiver concerned regarding cognition?: No    PREVENTIVE SCREENINGS      Cardiovascular Screening:    General: Screening Not Indicated and History Lipid Disorder      Diabetes Screening:     General: Screening Not Indicated and History Diabetes      Colorectal Cancer Screening:     General: Screening Current    Due for: Colonoscopy - Low Risk      Osteoporosis Screening:    General: Screening Not Indicated      Lung Cancer Screening:     General: Screening Not Indicated      Hepatitis C Screening:    General: Screening Current    Screening, Brief Intervention, and Referral to Treatment (SBIRT)    Screening  Typical number of drinks in a day: 0  Typical number of drinks in a week: 0  Interpretation: Low risk drinking behavior. Single Item Drug Screening:  How often have you used an illegal drug (including marijuana) or a prescription medication for non-medical reasons in the past year? never    Single Item Drug Screen Score: 0  Interpretation: Negative screen for possible drug use disorder    Review of Current Opioid Use  Opioid Risk Tool (ORT) Score: 0  Opioid Risk Tool (ORT) Interpretation: Score 0-3: Low risk for opioid misuse    Vision Screening    Right eye Left eye Both eyes   Without correction      With correction 20/25 20/25 20/25        Physical Exam:     /74 (BP Location: Left arm, Patient Position: Sitting, Cuff Size: Large)   Pulse 61   Temp (!) 97.3 °F (36.3 °C) (Tympanic)   Ht 6' 1" (1.854 m)   Wt (!) 143 kg (315 lb 9.6 oz)   SpO2 94%   BMI 41.64 kg/m²     Physical Exam  Vitals reviewed. Constitutional:       Appearance: Normal appearance. He is obese. Cardiovascular:      Rate and Rhythm: Normal rate and regular rhythm. Heart sounds: Normal heart sounds. No murmur heard.   Pulmonary: Effort: Pulmonary effort is normal.      Breath sounds: Normal breath sounds. Musculoskeletal:      Right lower leg: Edema present. Left lower leg: Edema present. Neurological:      Mental Status: He is alert and oriented to person, place, and time. Psychiatric:         Mood and Affect: Mood normal.         Behavior: Behavior normal.         Thought Content:  Thought content normal.         Judgment: Judgment normal.          Kamran Bo, JACLYN

## 2023-09-26 ENCOUNTER — TELEPHONE (OUTPATIENT)
Age: 63
End: 2023-09-26

## 2023-09-26 NOTE — TELEPHONE ENCOUNTER
New Patient    What is the reason for the patient’s appointment?:Patient called stating he was referred to see Urology for slow stream. BPH.  Pt scheduled to see Dr. Ricky Diana in [address]    What office location does the patient prefer?:[place]     Does patient have Imaging/Lab Results:    Have patient records been requested?:  If No, are the records showing in Epic:       INSURANCE:   Do we accept the patient's insurance or is the patient Self-Pay?:    Insurance Provider:Medicare [facility]   Plan Type/Number:   Member ID#:       HISTORY:   Has the patient had any previous Urologist(s)?:[facility]     Was the patient seen in the ED?:    Has the patient had any outside testing done?:    Does the patient have a personal history of cancer?:no

## 2023-09-26 NOTE — TELEPHONE ENCOUNTER
Pt call transferred to nurses line    Pt with hx of esophageal dysphagia & GERD last seen in 2021. He called in with complaints of difficulty swallowing solids and liquids. He feels food gets stuck but eventually passes down. He feels discomfort with swallowing.     urgent OV scheduled.

## 2023-09-27 ENCOUNTER — TELEPHONE (OUTPATIENT)
Dept: GASTROENTEROLOGY | Facility: CLINIC | Age: 63
End: 2023-09-27

## 2023-09-27 ENCOUNTER — OFFICE VISIT (OUTPATIENT)
Dept: GASTROENTEROLOGY | Facility: CLINIC | Age: 63
End: 2023-09-27
Payer: MEDICARE

## 2023-09-27 VITALS
HEART RATE: 57 BPM | DIASTOLIC BLOOD PRESSURE: 53 MMHG | SYSTOLIC BLOOD PRESSURE: 102 MMHG | HEIGHT: 72 IN | BODY MASS INDEX: 41.37 KG/M2 | WEIGHT: 305.4 LBS

## 2023-09-27 DIAGNOSIS — R13.19 ESOPHAGEAL DYSPHAGIA: ICD-10-CM

## 2023-09-27 DIAGNOSIS — T40.2X5A CONSTIPATION DUE TO OPIOID THERAPY: ICD-10-CM

## 2023-09-27 DIAGNOSIS — K21.9 GASTROESOPHAGEAL REFLUX DISEASE WITHOUT ESOPHAGITIS: Primary | ICD-10-CM

## 2023-09-27 DIAGNOSIS — K59.03 CONSTIPATION DUE TO OPIOID THERAPY: ICD-10-CM

## 2023-09-27 DIAGNOSIS — Z79.02 LONG TERM CURRENT USE OF CLOPIDOGREL: ICD-10-CM

## 2023-09-27 DIAGNOSIS — Z12.11 COLON CANCER SCREENING: ICD-10-CM

## 2023-09-27 DIAGNOSIS — K44.9 HIATAL HERNIA: ICD-10-CM

## 2023-09-27 DIAGNOSIS — K62.5 RECTAL BLEEDING: ICD-10-CM

## 2023-09-27 DIAGNOSIS — Z90.3 H/O GASTRIC SLEEVE: ICD-10-CM

## 2023-09-27 PROCEDURE — 99214 OFFICE O/P EST MOD 30 MIN: CPT | Performed by: PHYSICIAN ASSISTANT

## 2023-09-27 RX ORDER — OMEPRAZOLE 40 MG/1
40 CAPSULE, DELAYED RELEASE ORAL
Qty: 60 CAPSULE | Refills: 5 | Status: SHIPPED | OUTPATIENT
Start: 2023-09-27

## 2023-09-27 NOTE — TELEPHONE ENCOUNTER
Scheduled date of combo (as of today): 11/16/2023  Physician performing combo: Dr. Zechariah Guallpa  Location of combo: [facility]  Bowel prep reviewed with patient: Clenpiq + Miralax daily for one week prior  Instructions reviewed with patient by: Gave pt instructions packet  Clearances: Pletal - Cilostazol

## 2023-09-27 NOTE — PROGRESS NOTES
[facility] - Outpatient Follow-up Note  Sharon Reed 63 y.o. male MRN: [id number]  Encounter: [id number]    ASSESSMENT AND PLAN:    1. Gastroesophageal reflux disease without esophagitis  2. Esophageal dysphagia  3. Hiatal hernia  4. H/O gastric sleeve  Patient with breakthrough reflux and recurrent dysphagia last endoscopy 2021 showed a large hiatal hernia and esophageal plaques biopsies of which were unremarkable. He has a history of gastric sleeve. He reports dilation helped his symptoms in the past. The differential diagnosis of patient's dysphagia includes, web, ring, stricture, esophagitis or underlying motility disorder. In the interim patient advised to eat slowly, chew thoroughly and avoid the foods that exacerbate their symptoms. An antireflux regimen and lifestyle modifications were reviewed. Offered an endoscopy with possible dilation to evaluate for the above differential.  I reviewed the indications, risk, benefits and alternatives of endoscopy and he is willing to proceed. We will increase Prilosec to 40 mg twice daily 30 minutes AC.    - omeprazole (PriLOSEC) 40 MG capsule; Take 1 capsule (40 mg total) by mouth 2 (two) times a day before meals  Dispense: 60 capsule; Refill: 5  - EGD; Future    5. Constipation due to opioid therapy  6. Rectal bleeding  Patient with chronic constipation likely due to opioids from intrathecal pain pump he has failed multiple medications including Colace, Miralax,, Dulcolax, senna, lactulose,  suppositories, enemas. Advised to get plenty of fluid and fiber in his diet and minimize narcotics if possible. Will prescribe Movantik 25 mg daily. Hopefully this gets his bowels moving. If there is trouble with getting this covered patient advised to contact the office. Suspect the bleeding is local anorectal in nature. In case it is hemorrhoids local care discussed.   Hopefully this will resolve with improving his constipation. We will further eval for source of lower GI blood loss with upcoming colonoscopy. - naloxegol oxalate (MOVANTIK) 25 MG tablet; Take 1 tablet (25 mg total) by mouth daily in the early morning  Dispense: 30 tablet; Refill: 3    7. Colon cancer screening   I did recommend a colonoscopy for colorectal cancer screening and to evaluate his rectal bleeding. I reviewed the indications, risks, benefits and alternatives of a colonoscopy and the patient is willing to proceed. Would perform this same day as endoscopy to avoid holding Plavix twice. - Colonoscopy; Future  - sodium picosulfate, magnesium oxide, citric acid (Clenpiq) oral solution; Take 175 mL (1 bottle) the evening before the colonoscopy, between 5 PM and 9 PM, followed by a second 175 mL bottle 5 hours before the colonoscopy. Dispense: 350 mL; Refill: 0    8. Long term current use of clopidogrel  Cardiac clearance requested for procedures and to hold Plavix 5 days prior. Follow up appointment: Amadou Hernandez  ______________________________________________________________________    No chief complaint on file. HPI:     Patient is a 63 y.o. male with a significant PMH of DM, CHF, CVA > 5 y ago, CAD stent 2022 on ASA/plavix , myocarditis, ARLEEN, dementia, CKD, opioid dependence with intrathecal pain pump, chronic respiratory failure, copd, depression, BPH, obesity S/P gastric sleeve 11/2019. Pt A & O X 3, consents for self. Last evaluated August 2021 for dysphagia nausea with history of sleeve gastrectomy symptoms have been chronic. EGD and Zofran ordered as Compazine failed to work. Also with constipation on opioids advised to resume MiraLAX. EGD September 2021 showed a 5 cm hiatal hernia rings exudate and plaque in entire esophagus, status post sleeve gastrectomy, gastric erosion, erythema edema of the duodenum. Esophageal biopsies showed reactive inflammation, gastric and small bowel biopsy unremarkable patient given Carafate. COLON 2017 pt unsure of results, record unavailable. 8/2023 CBC, CMP essentially WNL. 8/2023 CTA C/A/P NAD.   9/2020 CT A/P with contrast S/P sleeve, possible leakage form pain pump. Pt notes dysphagia to solids X6 m with subsequent chest pain and regurgitation. Pt still having daily breakthrough reflux on Prilosec 40 mg daily. Still on ASA/Plavix. Pt admits to constipation with hard stools every 9 days without melena. Does note scant rectal bleeding which he associates with straining. Has not tried anything for it. Eating ok, gaining weight. (60 lb in 8m associates with water weight, on directics)    Pt denies all other GI and constitutional symptoms. ROS:   Constitutional: denies fatigue, fever. HEENT: denies visual disturbance, postnasal drip, sore throat. Respiratory: denies cough, shortness of breath. Cardiovascular: +chest pain, leg swelling. Gastrointestinal: as noted above in HPI. : denies difficulty urinating, dysuria. Musculoskeletal: +arthralgias, back pain. Neurological: denies dizziness, syncope. Psychiatric: denies confusion, anxiety.     Historical Information   Past Medical History:   Diagnosis Date   • Acute on chronic diastolic congestive heart failure (HCC)    • Altered gait    • Alzheimer disease (HCC)     per patients,,early onset    • Angina pectoris (HCC)    • Anxiety    • Arthritis    • Brain aneurysm     coils placed   • Cardiac disease    • Chest pain 1/13/2016   • Chronic kidney disease    • Chronic pain     back/ right groin and rle- has morphine pump   • Constipation    • COPD (chronic obstructive pulmonary disease) ([facility])    • Coronary artery disease    • CPAP (continuous positive airway pressure) dependence    • Decubital ulcer     sacral decub-occured 5/2019-sees wound care/debide in OR today 6/6/2019   • Dependent on walker for ambulation     w/c for long distance   • Depression    • Diabetes mellitus (HCC)     insulin dependent   • Dizziness     occ   • Dysphagia    • Enlarged prostate    • Fall    • Fall at home 5/3/2019   • GERD (gastroesophageal reflux disease)    • Heart failure ([address])    • Hiatal hernia    • Hx of gastric bypass 11/19/2018   • Hypercholesterolemia    • Hypertension    • MI (myocardial infarction) ([address])     2017- stents x2   • Migraine    • Morbid obesity ([address])     gastric bypass sleeve 11/2018-wt loss 125 lb   • Neuropathy    • Oxygen dependent     Q HS  2LPM with CPAP and prn during day 2-3 LPM    • Pressure injury of skin    • Pressure injury of skin of sacral region 6/3/2019    Added automatically from request for surgery [id number]   • Renal disorder    • Shortness of breath    • Skin abnormality     sacral wound - covered with pad   • Sleep apnea    • Stented coronary artery    • Stroke [facility])     vision loss b/l  2005, residual R leg weakness   • Type 2 diabetes mellitus with diabetic neuropathy, with long-term current use of insulin ([address]) 10/18/2019   • Type 2 diabetes mellitus with renal complication (HCC)     insulin dependent   • Urinary frequency    • Use of cane as ambulatory aid    • Wears dentures    • Wears glasses    • Wears glasses    • Wheelchair dependent      Past Surgical History:   Procedure Laterality Date   • BACK SURGERY     • BRAIN SURGERY     • CARDIAC CATHETERIZATION      with stents   • CARDIAC CATHETERIZATION N/A 8/18/2023    Procedure: Cardiac Coronary Angiogram;  Surgeon: Alma Mcbride MD;  Location: [facility]; Service: Cardiology   • CEREBRAL ANEURYSM REPAIR      with coils   • COLONOSCOPY     • ESOPHAGOGASTRODUODENOSCOPY N/A 7/1/2016    Procedure: ESOPHAGOGASTRODUODENOSCOPY (EGD); Surgeon: Edilia Saba MD;  Location: [facility] GI [facility];   Service:    • GASTRIC BYPASS  11/19/2018   • HERNIA REPAIR     • HIATAL HERNIA REPAIR     • INFUSION PUMP IMPLANTATION Left     morphine   • INTRATHECAL PUMP IMPLANTATION Left 7/9/2020    Procedure: REVISION INTRATHECAL PAIN PUMP POCKET, LEFT ABDOMEN;  Surgeon: Aline Ruth MD; Location: [facility];  Service: Neurosurgery   • KNEE ARTHROSCOPY Right    • KNEE ARTHROSCOPY Right    • PERONEAL NERVE DECOMPRESSION Right    • MT DEBRIDEMENT OPEN WOUND 20 SQ CM/< N/A 6/6/2019    Procedure: EXCISIONAL DEBRIDEMENT OF SACRAL DECUBITUS ULCER;  Surgeon: Julius Park MD;  Location: [facility];  Service: General   • MT ESOPHAGOGASTRODUODENOSCOPY TRANSORAL DIAGNOSTIC N/A 2/27/2017    Procedure: ESOPHAGOGASTRODUODENOSCOPY (EGD); Surgeon: Batsheva Dow MD;  Location: [facility]; Service: Gastroenterology   • MT ESOPHAGOGASTRODUODENOSCOPY TRANSORAL DIAGNOSTIC N/A 8/23/2018    Procedure: ESOPHAGOGASTRODUODENOSCOPY (EGD) with biopsy;  Surgeon: Batsheva Dow MD;  [facility]: [facility];   Service: Gastroenterology   • MT IMPLTJ/RPLCMT ITHCL/EDRL DRUG NFS PRGRBL PUMP Left 10/13/2020    Procedure: EXPLORATION OF INTRATHECAL PAIN PUMP SYSTEM INTEGRITY FOR POSSIBLE REPLACEMENT OF CATHETER AND PUMP.;  Surgeon: Brigida Dixon MD;  Location: [facility];  Service: Neurosurgery   • MT IMPLTJ/RPLCMT ITHCL/EDRL DRUG NFS SUBQ RSVR N/A 1/19/2017    Procedure: REMOVAL / [address];  Surgeon: Adron Schaumann, MD;  [facility]: [facility];  Service: Orthopedics   • MT IMPLTJ/RPLCMT ITHCL/EDRL DRUG NFS SUBQ RSVR N/A 5/16/2016    Procedure: REPLACEMENT AND PROGRAM PUMP ;  Surgeon: Adron Schaumann, MD;  [facility];  Service: Orthopedics   • MT PRQ IMPLTJ NSTIM ELECTRODE ARRAY EPIDURAL Right 3/17/2021    Procedure: INSERTION THORACIC DORSAL COLUMN SPINAL CORD STIMULATOR PERCUTANEOUS W IMPLANTABLE PULSE GENERATOR, RIGHT;  Surgeon: Brigida Dixon MD;  Location: [facility];  Service: Neurosurgery     Social History     Substance and Sexual Activity   Alcohol Use Not Currently     Social History     Substance and Sexual Activity   Drug Use Not Currently   • Types: Marijuana     Social History     Tobacco Use   Smoking Status Never   Smokeless Tobacco Never     Family History   Problem Relation Age of Onset   • Diabetes unspecified Mother    • Diabetes unspecified Brother    • Diabetes unspecified Paternal Uncle    • Diabetes unspecified Maternal Grandmother    • Diabetes unspecified Paternal Grandmother    • Diabetes unspecified Brother    • Heart attack Father          Current Outpatient Medications:   •  albuterol (ACCUNEB) 1.25 MG/3ML nebulizer solution  •  amLODIPine (NORVASC) 5 mg tablet  •  aspirin 81 mg chewable tablet  •  atorvastatin (LIPITOR) 80 mg tablet  •  baclofen 10 mg tablet  •  CALCIUM PO  •  carvedilol (COREG) 12.5 mg tablet  •  clopidogrel (PLAVIX) 75 mg tablet  •  Cyanocobalamin (VITAMIN B-12 PO)  •  Fluticasone-Salmeterol (Advair Diskus) 500-50 mcg/dose inhaler  •  folic acid (FOLVITE) 1 mg tablet  •  furosemide (LASIX) 40 mg tablet  •  gabapentin (NEURONTIN) 300 mg capsule  •  gabapentin (NEURONTIN) 600 MG tablet  •  hydrocortisone 1 % lotion  •  isosorbide mononitrate (IMDUR) 60 mg 24 hr tablet  •  lidocaine (LIDODERM) 5 %  •  losartan (COZAAR) 50 mg tablet  •  methocarbamol (ROBAXIN) 750 mg tablet  •  Morphine Sulfate Microinfusion (MORPHINE SULF MICROINFUSION PF IJ)  •  naloxegol oxalate (MOVANTIK) 25 MG tablet  •  nitroglycerin (NITROSTAT) 0.4 mg SL tablet  •  nystatin (MYCOSTATIN) cream  •  omeprazole (PriLOSEC) 40 MG capsule  •  ondansetron (ZOFRAN) 4 mg tablet  •  potassium chloride (K-DUR,KLOR-CON) 20 mEq tablet  •  ranolazine (RANEXA) 1000 MG SR tablet  •  sodium picosulfate, magnesium oxide, citric acid (Clenpiq) oral solution  •  tamsulosin (Flomax) 0.4 mg  •  traZODone (DESYREL) 50 mg tablet  No Known Allergies  Reviewed medications and allergies and updated as indicated    PHYSICAL EXAM:    Blood pressure 102/53, pulse 57, height 6' (1.829 m), weight (!) 139 kg (305 lb 6.4 oz). Body mass index is 41.42 kg/m².     Constitutional: Well-developed, no acute distress  HEENT: normocephalic, mucous membranes moist.  Neck: Supple  Skin: warm and dry  Respiratory: coarse breath sounds B/L.  Cardiovascular: Heart is regular rate and rhythm. Gastrointestinal: obese, Soft, mild generalized tenderness, nondistended with normal active bowel sounds. No masses, guarding, rebound. Rectal Exam: Deferred. Integumentary: Warm and dry  Extremities:2+  Edema LE, ambulates with rolling walker  Neurologic: Nonfocal. A & O ×3. Psychiatric: Normal affect. Lab Results:   As above    Radiology Results:   As above        Patient expressed understanding and had all questions and concerns addressed. Advised patient to call with any questions, failure to improve, or if symptoms worsen. Christin Barahona PA-C  09/27/23  12:58 PM    This chart was completed in part utilizing [employer] speech voice recognition software. Random word insertions, pronoun errors, and incomplete sentences are an occasional consequence of this system due to software limitations, and ambient noise. Any questions or concerns about the content, text, or information contained within the body of this dictation should be directly addressed to the provider for clarification.

## 2023-10-12 DIAGNOSIS — G43.709 CHRONIC MIGRAINE WITHOUT AURA WITHOUT STATUS MIGRAINOSUS, NOT INTRACTABLE: ICD-10-CM

## 2023-10-12 DIAGNOSIS — M54.17 RADICULOPATHY, LUMBOSACRAL REGION: ICD-10-CM

## 2023-10-12 RX ORDER — GABAPENTIN 300 MG/1
300 CAPSULE ORAL AS NEEDED
Qty: 90 CAPSULE | Refills: 0 | Status: SHIPPED | OUTPATIENT
Start: 2023-10-12

## 2023-10-16 RX ORDER — METHOCARBAMOL 750 MG/1
750 TABLET, FILM COATED ORAL EVERY 6 HOURS PRN
Qty: 30 TABLET | Refills: 0 | Status: SHIPPED | OUTPATIENT
Start: 2023-10-16

## 2023-10-25 NOTE — PROGRESS NOTES
Cardiology Office Follow Up  Mango Omalley  1960  [id number]      ASSESSMENT:  History of myocarditis  8/2023 cardiac MRI: Patchy intramyocardial fibrosis/edema involving basal anteroseptal and basal inferior walls highly suggestive of acute myocarditis  As there is no pericardial involvement anti-inflammatory medications including colchicine were not continued at this time  He was continued on dual antiplatelet therapy at this time and recommended pain management for chest pain as well as abstinence from exercise for 12 weeks with repeat echocardiogram in 12 weeks  History of coronary artery disease  2010 per patient had his first MI with stenting of unknown vessel  2015 LHC: Distal LAD 85%, proximal circumflex 80%, distal circumflex 90%, first obtuse marginal 80%, mid RCA 40%, distal RCA 40%, PCI/OMER x1 to proximal circumflex  2017 [facility]: Distal LAD 95%, proximal circumflex stent patent, mid RCA 85%, PCI/OMER x1 to mid RCA  2020 [facility]: LM normal, proximal LAD 85%, mid LAD 5-10%, circumflex 20% mid lesion, obtuse marginal branches luminal irregularities, proximal RCA 20%, distal RCA 30%, PCI/OMER x1 to proximal LAD  2022 LHC: LM luminal irregularities, ostial LAD 30%, proximal LAD stent patent, mid LAD 80% stenosis, ostial to mid circumflex 20%, obtuse marginal branch with luminal irregularities, proximal RCA 20%, distal RCA 30%, RPDA 50%, PCI/OMER x1 in proximal to mid LAD  8/2023 LHC: Distal left main 50%, ostial LAD 40%, mid LAD 40%, mid circumflex 20%, patent LAD stent, circumflex, and RCA stents, medical management recommended  Preserved LV systolic function  7/5344 LHC: EF 60 to 65%, RV normal size and systolic function, normal biatrial size, no hemodynamically significant valvular disease  8/2023 Echo: LVEF 60%, no hemodynamically significant valvular disease  Type 2 diabetes    PLAN/ DISCUSSION:  Doing well today having no symptoms of angina, CHF, nor arrhythmias.  He examines euvolemic with the addition of Lasix at her last office visit. He is going to start cardiac rehab soon which I do agree is an excellent idea. Weight is stable at home around 290-295 pounds  I have made no changes to his medical regimen today  We will see him back in 4 months or sooner if any issues arise    Interval History/ HPI:   At our last office visit I had prescribed Lasix 40 daily with 20 daily of potassium. Plan was to see him back in the office 1 month afterwards to see how he was doing but he was unable to make the next appointment. He is here today for a follow-up. Today he comes to the office unaccompanied. He reports that since starting Lasix he feels much better. His breathing is stable. He is better exercise tolerance and is able to walk as well as do some light yard work. He is going cardiac rehab soon. The swelling in his legs has gone down significantly. He weighs himself every other day and his weight is stable. He has no chest pain or chest pressure. He is able to take all of his medications without any major issues.      Vitals:  /76 (BP Location: Left arm, Patient Position: Sitting, Cuff Size: Large)   Pulse 70   Wt 135 kg (297 lb)   BMI 40.28 kg/m²      Past Medical History:   Diagnosis Date   • Acute on chronic diastolic congestive heart failure (HCC)    • Altered gait    • Alzheimer disease (HCC)     per patients,,early onset    • Angina pectoris (HCC)    • Anxiety    • Arthritis    • Brain aneurysm     coils placed   • Cardiac disease    • Chest pain 1/13/2016   • Chronic kidney disease    • Chronic pain     back/ right groin and rle- has morphine pump   • Constipation    • COPD (chronic obstructive pulmonary disease) (HCC)    • Coronary artery disease    • CPAP (continuous positive airway pressure) dependence    • Decubital ulcer     sacral decub-occured 5/2019-sees wound care/debide in OR today 6/6/2019   • Dependent on walker for ambulation     w/c for long distance   • Depression • Diabetes mellitus (HCC)     insulin dependent   • Dizziness     occ   • Dysphagia    • Enlarged prostate    • Fall    • Fall at home 5/3/2019   • GERD (gastroesophageal reflux disease)    • Heart failure ([address])    • Hiatal hernia    • Hx of gastric bypass 11/19/2018   • Hypercholesterolemia    • Hypertension    • MI (myocardial infarction) ([address])     2017- stents x2   • Migraine    • Morbid obesity ([address])     gastric bypass sleeve 11/2018-wt loss 125 lb   • Neuropathy    • Oxygen dependent     Q HS  2LPM with CPAP and prn during day 2-3 LPM    • Pressure injury of skin    • Pressure injury of skin of sacral region 6/3/2019    Added automatically from request for surgery [id number]   • Renal disorder    • Shortness of breath    • Skin abnormality     sacral wound - covered with pad   • Sleep apnea    • Stented coronary artery    • Stroke [facility])     vision loss b/l  2005, residual R leg weakness   • Type 2 diabetes mellitus with diabetic neuropathy, with long-term current use of insulin ([address]) 10/18/2019   • Type 2 diabetes mellitus with renal complication (HCC)     insulin dependent   • Urinary frequency    • Use of cane as ambulatory aid    • Wears dentures    • Wears glasses    • Wears glasses    • Wheelchair dependent      Social History     Socioeconomic History   • Marital status: /Civil Union     Spouse name: Brian Sep   • Number of children: 5   • Years of education: Not on file   • Highest education level: GED or equivalent   Occupational History   • Not on file   Tobacco Use   • Smoking status: Never   • Smokeless tobacco: Never   Vaping Use   • Vaping Use: Never used   Substance and Sexual Activity   • Alcohol use: Not Currently   • Drug use: Not Currently     Types: Marijuana   • Sexual activity: Not Currently   Other Topics Concern   • Not on file   Social History Narrative   • Not on file     Social Determinants of Health     Financial Resource Strain: High Risk (9/19/2023)    Overall Financial Resource Strain (CARDIA)    • Difficulty of Paying Living Expenses: Very hard   Food Insecurity: No Food Insecurity (8/25/2023)    Hunger Vital Sign    • Worried About Running Out of Food in the Last Year: Never true    • Ran Out of Food in the Last Year: Never true   Transportation Needs: No Transportation Needs (9/19/2023)    PRAPARE - Transportation    • Lack of Transportation (Medical): No    • Lack of Transportation (Non-Medical): No   Physical Activity: Inactive (9/6/2019)    Exercise Vital Sign    • Days of Exercise per Week: 0 days    • Minutes of Exercise per Session: 0 min   Stress: Stress Concern Present (9/6/2019)    [facility]    • Feeling of Stress : Very much   Social Connections:  Moderately Isolated (9/6/2019)    Social Connection and Isolation Panel [NHANES]    • Frequency of Communication with Friends and Family: Never    • Frequency of Social Gatherings with Friends and Family: Twice a week    • Attends Gnosticist Services: 1 to 4 times per year    • Active Member of Clubs or Organizations: No    • Attends Club or Organization Meetings: Never    • Marital Status:    Intimate Partner Violence: Not At Risk (9/6/2019)    Humiliation, Afraid, Rape, and Kick questionnaire    • Fear of Current or Ex-Partner: No    • Emotionally Abused: No    • Physically Abused: No    • Sexually Abused: No   Housing Stability: Low Risk  (8/25/2023)    Housing Stability Vital Sign    • Unable to Pay for Housing in the Last Year: No    • Number of Places Lived in the Last Year: 1    • Unstable Housing in the Last Year: No      Family History   Problem Relation Age of Onset   • Diabetes unspecified Mother    • Diabetes unspecified Brother    • Diabetes unspecified Paternal Uncle    • Diabetes unspecified Maternal Grandmother    • Diabetes unspecified Paternal Grandmother    • Diabetes unspecified Brother    • Heart attack Father      Past Surgical History: Procedure Laterality Date   • BACK SURGERY     • BRAIN SURGERY     • CARDIAC CATHETERIZATION      with stents   • CARDIAC CATHETERIZATION N/A 8/18/2023    Procedure: Cardiac Coronary Angiogram;  Surgeon: Dai Saez MD;  [facility]: [facility]; Service: Cardiology   • CEREBRAL ANEURYSM REPAIR      with coils   • COLONOSCOPY     • ESOPHAGOGASTRODUODENOSCOPY N/A 7/1/2016    Procedure: ESOPHAGOGASTRODUODENOSCOPY (EGD); Surgeon: Rafy Boyd MD;  Location: [facility]; Service:    • GASTRIC BYPASS  11/19/2018   • HERNIA REPAIR     • HIATAL HERNIA REPAIR     • INFUSION PUMP IMPLANTATION Left     morphine   • INTRATHECAL PUMP IMPLANTATION Left 7/9/2020    Procedure: REVISION INTRATHECAL PAIN PUMP POCKET, LEFT ABDOMEN;  Surgeon: Rosalva Rai MD;  Location[facility];  Service: Neurosurgery   • KNEE ARTHROSCOPY Right    • KNEE ARTHROSCOPY Right    • PERONEAL NERVE DECOMPRESSION Right    • RI DEBRIDEMENT OPEN WOUND 20 SQ CM/< N/A 6/6/2019    Procedure: EXCISIONAL DEBRIDEMENT OF SACRAL DECUBITUS ULCER;  Surgeon: Melody Ayala MD;  [facility];  Service: General   • RI ESOPHAGOGASTRODUODENOSCOPY TRANSORAL DIAGNOSTIC N/A 2/27/2017    Procedure: ESOPHAGOGASTRODUODENOSCOPY (EGD); Surgeon: Rafy Boyd MD;  Location: [facility]; Service: Gastroenterology   • RI ESOPHAGOGASTRODUODENOSCOPY TRANSORAL DIAGNOSTIC N/A 8/23/2018    Procedure: ESOPHAGOGASTRODUODENOSCOPY (EGD) with biopsy;  Surgeon: Rafy Boyd MD;  [facility];   Service: Gastroenterology   • RI IMPLTJ/RPLCMT ITHCL/EDRL DRUG NFS PRGRBL PUMP Left 10/13/2020    Procedure: EXPLORATION OF INTRATHECAL PAIN PUMP SYSTEM INTEGRITY FOR POSSIBLE REPLACEMENT OF CATHETER AND PUMP.;  Surgeon: Rosalva Rai MD;  Location: [facility];  Service: Neurosurgery   • RI IMPLTJ/RPLCMT ITHCL/EDRL DRUG NFS SUBQ RSVR N/A 1/19/2017    Procedure: REMOVAL / EXCHANGE INTRATHECAL PAIN PUMP;  Surgeon: Ebony Lopez MD;  [facility];  Service: Orthopedics   • ME IMPLTJ/RPLCMT ITHCL/EDRL DRUG NFS SUBQ RSVR N/A 5/16/2016    Procedure: REPLACEMENT AND PROGRAM PUMP ;  Surgeon: Rasta Morrow MD;  [facility];  Service: Orthopedics   • ME PRQ IMPLTJ NSTIM ELECTRODE ARRAY EPIDURAL Right 3/17/2021    Procedure: INSERTION THORACIC DORSAL COLUMN SPINAL CORD STIMULATOR PERCUTANEOUS W IMPLANTABLE PULSE GENERATOR, RIGHT;  Surgeon: Rhett Cohen MD;  [facility];  Service: Neurosurgery       Current Outpatient Medications:   •  albuterol (ACCUNEB) 1.25 MG/3ML nebulizer solution, Take 3 mL (1.25 mg total) by nebulization every 6 (six) hours as needed for wheezing, Disp: 360 mL, Rfl: 1  •  amLODIPine (NORVASC) 5 mg tablet, Take 1 tablet (5 mg total) by mouth daily, Disp: 90 tablet, Rfl: 1  •  aspirin 81 mg chewable tablet, Chew 1 tablet (81 mg total) daily, Disp: 90 tablet, Rfl: 1  •  atorvastatin (LIPITOR) 80 mg tablet, Take 1 tablet (80 mg total) by mouth daily after dinner, Disp: 90 tablet, Rfl: 1  •  baclofen 10 mg tablet, Take 10 mg by mouth 3 (three) times a day, Disp: , Rfl:   •  CALCIUM PO, Take 1 tablet by mouth daily, Disp: , Rfl:   •  carvedilol (COREG) 12.5 mg tablet, Take 1 tablet (12.5 mg total) by mouth 2 (two) times a day with meals, Disp: 60 tablet, Rfl: 3  •  clopidogrel (PLAVIX) 75 mg tablet, Take 1 tablet (75 mg total) by mouth daily, Disp: 90 tablet, Rfl: 1  •  Cyanocobalamin (VITAMIN B-12 PO), Take 1 tablet by mouth daily, Disp: , Rfl:   •  Fluticasone-Salmeterol (Advair Diskus) 500-50 mcg/dose inhaler, Inhale 1 puff 2 (two) times a day, Disp: 60 blister, Rfl: 1  •  folic acid (FOLVITE) 1 mg tablet, Take 1 mg by mouth daily , Disp: , Rfl: 3  •  furosemide (LASIX) 40 mg tablet, Take 1 tablet (40 mg total) by mouth daily, Disp: 30 tablet, Rfl: 3  •  gabapentin (NEURONTIN) 300 mg capsule, TAKE 1 CAPSULE (300 MG TOTAL) BY MOUTH AS NEEDED (MIGRAINE), Disp: 90 capsule, Rfl: 0  •  gabapentin (NEURONTIN) 600 MG tablet, Take 1,200 mg by mouth 2 (two) times a day, Disp: , Rfl:   •  hydrocortisone 1 % lotion, APPLY TOPICALLY 2 (TWO) TIMES A DAY INDICATIONS  USING ON FEET., Disp: , Rfl:   •  isosorbide mononitrate (IMDUR) 60 mg 24 hr tablet, Take 1 tablet (60 mg total) by mouth daily Do not start before August 22, 2023., Disp: 30 tablet, Rfl: 0  •  lidocaine (LIDODERM) 5 %, , Disp: , Rfl:   •  losartan (COZAAR) 50 mg tablet, Take 1 tablet (50 mg total) by mouth daily, Disp: 90 tablet, Rfl: 1  •  methocarbamol (ROBAXIN) 750 mg tablet, TAKE 1 TABLET (750 MG TOTAL) BY MOUTH EVERY 6 (SIX) HOURS AS NEEDED FOR MUSCLE SPASMS, Disp: 30 tablet, Rfl: 0  •  Morphine Sulfate Microinfusion (MORPHINE SULF MICROINFUSION PF IJ), Inject 14 mg as directed daily Via infusion pump, Disp: , Rfl:   •  naloxegol oxalate (MOVANTIK) 25 MG tablet, Take 1 tablet (25 mg total) by mouth daily in the early morning, Disp: 30 tablet, Rfl: 3  •  nitroglycerin (NITROSTAT) 0.4 mg SL tablet, Place 0.4 mg under the tongue every 5 (five) minutes as needed for chest pain (pt has not  used recently).   , Disp: , Rfl:   •  nystatin (MYCOSTATIN) cream, Apply 1 application topically 2 (two) times a day, Disp: , Rfl:   •  omeprazole (PriLOSEC) 40 MG capsule, Take 1 capsule (40 mg total) by mouth 2 (two) times a day before meals, Disp: 60 capsule, Rfl: 5  •  ondansetron (ZOFRAN) 4 mg tablet, Take 1 tablet (4 mg total) by mouth every 8 (eight) hours as needed for nausea or vomiting, Disp: 60 tablet, Rfl: 3  •  potassium chloride (K-DUR,KLOR-CON) 20 mEq tablet, Take 1 tablet (20 mEq total) by mouth daily, Disp: 30 tablet, Rfl: 3  •  ranolazine (RANEXA) 1000 MG SR tablet, Take 1 tablet (1,000 mg total) by mouth every 12 (twelve) hours, Disp: 60 tablet, Rfl: 3  •  sodium picosulfate, magnesium oxide, citric acid (Clenpiq) oral solution, Take 175 mL (1 bottle) the evening before the colonoscopy, between 5 PM and 9 PM, followed by a second 175 mL bottle 5 hours before the colonoscopy., Disp: 350 mL, Rfl: 0  •  tamsulosin (Flomax) 0.4 mg, Take 1 capsule (0.4 mg total) by mouth daily with dinner, Disp: 30 capsule, Rfl: 1  •  traZODone (DESYREL) 50 mg tablet, Take 1 tablet (50 mg total) by mouth 2 (two) times a day, Disp: 90 tablet, Rfl: 1      Review of Systems:  Review of Systems   Constitutional:  Negative for chills and fever. HENT:  Negative for ear pain and sore throat. Eyes:  Negative for pain and visual disturbance. Respiratory:  Negative for cough and shortness of breath. Cardiovascular:  Negative for chest pain and palpitations. Gastrointestinal:  Negative for abdominal pain and vomiting. Genitourinary:  Negative for dysuria and hematuria. Musculoskeletal:  Negative for arthralgias and back pain. Skin:  Negative for color change and rash. Neurological:  Negative for seizures and syncope. All other systems reviewed and are negative. Physical Exam:  Physical Exam  Constitutional:       Appearance: He is well-developed. HENT:      Head: Normocephalic and atraumatic. Eyes:      Pupils: Pupils are equal, round, and reactive to light. Cardiovascular:      Rate and Rhythm: Normal rate and regular rhythm. Heart sounds: No murmur heard. No friction rub. No gallop. Pulmonary:      Effort: Pulmonary effort is normal. No respiratory distress. Breath sounds: Normal breath sounds. No wheezing or rales. Abdominal:      General: Bowel sounds are normal. There is no distension. Palpations: Abdomen is soft. Tenderness: There is no abdominal tenderness. Musculoskeletal:         General: No deformity. Normal range of motion. Cervical back: Normal range of motion and neck supple. Skin:     General: Skin is warm and dry. Neurological:      Mental Status: He is alert and oriented to person, place, and time. Psychiatric:         Behavior: Behavior normal.       This note was completed in part utilizing [employer] Software.   Grammatical errors, random word insertions, spelling mistakes, and incomplete sentences can be an occasional consequence of this system secondary to software limitations, ambient noise, and hardware issues. If you have any questions or concerns about the content, text, or information contained within the body of this dictation, please contact the provider for clarification.

## 2023-10-31 ENCOUNTER — OFFICE VISIT (OUTPATIENT)
Dept: CARDIOLOGY CLINIC | Facility: CLINIC | Age: 63
End: 2023-10-31
Payer: MEDICARE

## 2023-10-31 VITALS
BODY MASS INDEX: 40.28 KG/M2 | DIASTOLIC BLOOD PRESSURE: 76 MMHG | WEIGHT: 297 LBS | SYSTOLIC BLOOD PRESSURE: 118 MMHG | HEART RATE: 70 BPM

## 2023-10-31 DIAGNOSIS — I25.10 CORONARY ARTERY DISEASE INVOLVING NATIVE CORONARY ARTERY OF NATIVE HEART WITHOUT ANGINA PECTORIS: ICD-10-CM

## 2023-10-31 DIAGNOSIS — I50.32 CHRONIC DIASTOLIC CONGESTIVE HEART FAILURE (HCC): Primary | ICD-10-CM

## 2023-10-31 PROCEDURE — 99214 OFFICE O/P EST MOD 30 MIN: CPT | Performed by: PHYSICIAN ASSISTANT

## 2023-10-31 NOTE — PATIENT INSTRUCTIONS
No changes to medications today, please continue everything the same. If you need refills of your cardiac medications prescribed by our office, please call us ahead of time so that they can be filled. We will see you back in the office in 4 months. If you have any questions or concerns in the mean time please do not hesitate to call our office.

## 2023-11-07 ENCOUNTER — TELEPHONE (OUTPATIENT)
Dept: GASTROENTEROLOGY | Facility: CLINIC | Age: 63
End: 2023-11-07

## 2023-11-08 NOTE — TELEPHONE ENCOUNTER
Our mutual patient is scheduled for procedure: Egd with possible dilation and colonoscopy    On: _11___/_  16  _/_    2023_      With: Dr. Montano___at [facility]______    He/She is taking the following blood thinner:   Plavix       Can this be stopped __5____ days prior to the procedure?       Last dose would be 11-10-23    Physician Approving clearance: ________________________    11/07/23    Jerene Human  [address]   [address] [facility] [id number]        [facility]

## 2023-11-09 ENCOUNTER — CONSULT (OUTPATIENT)
Dept: UROLOGY | Facility: MEDICAL CENTER | Age: 63
End: 2023-11-09
Payer: MEDICARE

## 2023-11-09 VITALS
HEART RATE: 72 BPM | HEIGHT: 72 IN | OXYGEN SATURATION: 98 % | SYSTOLIC BLOOD PRESSURE: 130 MMHG | BODY MASS INDEX: 39.28 KG/M2 | DIASTOLIC BLOOD PRESSURE: 78 MMHG | WEIGHT: 290 LBS

## 2023-11-09 DIAGNOSIS — R33.9 URINARY RETENTION WITH INCOMPLETE BLADDER EMPTYING: ICD-10-CM

## 2023-11-09 DIAGNOSIS — R39.12 BENIGN PROSTATIC HYPERPLASIA WITH WEAK URINARY STREAM: Primary | ICD-10-CM

## 2023-11-09 DIAGNOSIS — N40.1 BENIGN PROSTATIC HYPERPLASIA (BPH) WITH STRAINING ON URINATION: ICD-10-CM

## 2023-11-09 DIAGNOSIS — R39.16 BENIGN PROSTATIC HYPERPLASIA (BPH) WITH STRAINING ON URINATION: ICD-10-CM

## 2023-11-09 DIAGNOSIS — N40.1 BENIGN PROSTATIC HYPERPLASIA WITH WEAK URINARY STREAM: Primary | ICD-10-CM

## 2023-11-09 LAB — POST-VOID RESIDUAL VOLUME, ML POC: 28 ML

## 2023-11-09 PROCEDURE — 99204 OFFICE O/P NEW MOD 45 MIN: CPT | Performed by: UROLOGY

## 2023-11-09 PROCEDURE — 52000 CYSTOURETHROSCOPY: CPT | Performed by: UROLOGY

## 2023-11-09 PROCEDURE — 51798 US URINE CAPACITY MEASURE: CPT | Performed by: UROLOGY

## 2023-11-09 NOTE — PROGRESS NOTES
HISTORY:    1. Urinary hesitation to getting his flow started especially when standing. Once it does start, he says it flow somewhat slowly. Tamsulosin, but he does not think it helps at all. No UTIs, hematuria, prior  procedures    CT scan in August 2023 and CT of 2019 both showed distended bladder, estimated 250 mL in the bladder on those images. Bladder scan showed only 25 mL today, but I think the test was limited by obesity. Cystoscopy     Date/Time  11/9/2023 9:30 AM     Performed by  Avery Brown MD   Authorized by  Avery Brown MD         Procedure Details:  Procedure type: cystoscopy    Patient tolerance: Patient tolerated the procedure well with no immediate complications    Additional Procedure Details:      Patient presents for cystoscopy. I have discussed the reasons for doing the exam, and the potential risks and complications. Patient expressed understanding, and signed informed consent document. The patient was carefully  positioned supine on the examining table. Sterile preparation was performed on the urethra. Xylocaine jelly was instilled and left  Indwelling for the procedure. The 13 Lao flexible cystoscope was passed with the following findings:      Urethra: No stricture    Prostate:  lateral lobes minimally enlarged, not really obstructing                  Bladder: Moderate trabeculations, no lesions, tumor, or stones. Residual urine: 250 mL    Patient tolerated the procedure well and was escorted from the examining table. ASSESSMENT / PLAN:    1. Bladder scan not reliable    2. He actually has retention 250 mL, I think this is chronic based on CTs going back 4 years ago. 3.  There is really no BPH obstruction, tamsulosin is not helping, I think he could stop that.      4.  He will sit to void and see if that helps better    I reassured him he does not have any obstruction requiring any procedures    Reassess in 6-month    The following portions of the patient's history were reviewed and updated as appropriate: allergies, current medications, past family history, past medical history, past social history, past surgical history, and problem list.    Review of Systems      Objective:     Physical Exam  Genitourinary:     Comments: Penis testes normal    Prostate not really enlarged no nodules          No results found for: "PSA"]  BUN   Date Value Ref Range Status   08/31/2023 12 5 - 25 mg/dL Final   10/21/2015 12 5 - 25 mg/dL Final     Creatinine   Date Value Ref Range Status   08/31/2023 0.94 0.60 - 1.30 mg/dL Final     Comment:     Standardized to IDMS reference method   10/21/2015 1.03 0.60 - 1.30 mg/dL Final     Comment:     Standardized to IDMS reference method     No components found for: "CBC"      Patient Active Problem List   Diagnosis    Chronic diastolic congestive heart failure (HCC)    Coronary artery disease involving native coronary artery    Morbid obesity ([address])    CVA (cerebral vascular accident) ([address])    Stroke ([address])    Stented coronary artery    Obstructive sleep apnea syndrome    CPAP (continuous positive airway pressure) dependence    Brain aneurysm    Chronic pain disorder    Other constipation    Coronary artery disease    Sleep apnea    Bilateral leg edema    Chest pain    Stage 3 chronic kidney disease (HCC)    GERD (gastroesophageal reflux disease)    Opioid dependence, continuous ([facility])    Esophageal dysphagia    Chronic migraine without aura without status migrainosus, not intractable    Hyperlipidemia    Vitamin D deficiency    Symptomatic bradycardia    Bilateral foot pain    Orthostatic hypotension    Primary osteoarthritis of both knees    Mild cognitive impairment    COPD (chronic obstructive pulmonary disease) (HCC)    Major depressive disorder, recurrent, moderate ([facility])    Radiculopathy, lumbosacral region    Neuropathy    Presence of intrathecal pump    Skin inflammation    Status post insertion of spinal cord stimulator    History of gastric sleeve procedure    Myocarditis ([address])    Arteriosclerosis of artery of extremity ([address])    Hiatal hernia        Diagnoses and all orders for this visit:    Benign prostatic hyperplasia with weak urinary stream  -     Cancel: POCT urine dip auto non-scope  -     POCT Measure PVR    Benign prostatic hyperplasia (BPH) with straining on urination  -     Ambulatory Referral to Urology    Urinary retention with incomplete bladder emptying    Other orders  -     Cystoscopy           Patient ID: April Castaneda is a 63 y.o. male. Current Outpatient Medications:     albuterol (ACCUNEB) 1.25 MG/3ML nebulizer solution, Take 3 mL (1.25 mg total) by nebulization every 6 (six) hours as needed for wheezing, Disp: 360 mL, Rfl: 1    amLODIPine (NORVASC) 5 mg tablet, Take 1 tablet (5 mg total) by mouth daily, Disp: 90 tablet, Rfl: 1    aspirin 81 mg chewable tablet, Chew 1 tablet (81 mg total) daily, Disp: 90 tablet, Rfl: 1    atorvastatin (LIPITOR) 80 mg tablet, Take 1 tablet (80 mg total) by mouth daily after dinner, Disp: 90 tablet, Rfl: 1    baclofen 10 mg tablet, Take 10 mg by mouth 3 (three) times a day, Disp: , Rfl:     bisacodyl (DULCOLAX) 5 mg EC tablet, Take 2 tablets (10 mg total) by mouth 1 (one) time for 1 dose, Disp: 2 tablet, Rfl: 0    CALCIUM PO, Take 1 tablet by mouth daily, Disp: , Rfl:     carvedilol (COREG) 12.5 mg tablet, Take 1 tablet (12.5 mg total) by mouth 2 (two) times a day with meals, Disp: 60 tablet, Rfl: 3    clopidogrel (PLAVIX) 75 mg tablet, Take 1 tablet (75 mg total) by mouth daily, Disp: 90 tablet, Rfl: 1    Cyanocobalamin (VITAMIN B-12 PO), Take 1 tablet by mouth daily, Disp: , Rfl:     Fluticasone-Salmeterol (Advair Diskus) 500-50 mcg/dose inhaler, INHALE 1 PUFF TWICE A DAY.  RINSE MOUTH AFTER USE, Disp: 60 blister, Rfl: 1    folic acid (FOLVITE) 1 mg tablet, Take 1 mg by mouth daily , Disp: , Rfl: 3    furosemide (LASIX) 40 mg tablet, Take 1 tablet (40 mg total) by mouth daily, Disp: 30 tablet, Rfl: 3    gabapentin (NEURONTIN) 300 mg capsule, TAKE 1 CAPSULE (300 MG TOTAL) BY MOUTH AS NEEDED (MIGRAINE), Disp: 90 capsule, Rfl: 0    gabapentin (NEURONTIN) 600 MG tablet, Take 1,200 mg by mouth 4 (four) times a day, Disp: , Rfl:     hydrocortisone 1 % lotion, , Disp: , Rfl:     isosorbide mononitrate (IMDUR) 60 mg 24 hr tablet, Take 1 tablet (60 mg total) by mouth daily Do not start before August 22, 2023., Disp: 30 tablet, Rfl: 0    lidocaine (LIDODERM) 5 %, , Disp: , Rfl:     losartan (COZAAR) 50 mg tablet, Take 1 tablet (50 mg total) by mouth daily, Disp: 90 tablet, Rfl: 1    methocarbamol (ROBAXIN) 750 mg tablet, TAKE 1 TABLET (750 MG TOTAL) BY MOUTH EVERY 6 (SIX) HOURS AS NEEDED FOR MUSCLE SPASMS, Disp: 120 tablet, Rfl: 0    Morphine Sulfate Microinfusion (MORPHINE SULF MICROINFUSION PF IJ), Inject 14 mg as directed daily Via infusion pump, Disp: , Rfl:     naloxegol oxalate (MOVANTIK) 25 MG tablet, Take 1 tablet (25 mg total) by mouth daily in the early morning, Disp: 30 tablet, Rfl: 3    nitroglycerin (NITROSTAT) 0.4 mg SL tablet, Place 0.4 mg under the tongue every 5 (five) minutes as needed for chest pain (pt has not  used recently). , Disp: , Rfl:     nystatin (MYCOSTATIN) cream, Apply 1 application.  topically 2 (two) times a day, Disp: , Rfl:     omeprazole (PriLOSEC) 40 MG capsule, Take 1 capsule (40 mg total) by mouth 2 (two) times a day before meals, Disp: 60 capsule, Rfl: 5    ondansetron (ZOFRAN) 4 mg tablet, Take 1 tablet (4 mg total) by mouth every 8 (eight) hours as needed for nausea or vomiting, Disp: 60 tablet, Rfl: 3    potassium chloride (K-DUR,KLOR-CON) 20 mEq tablet, Take 1 tablet (20 mEq total) by mouth daily, Disp: 30 tablet, Rfl: 3    ranolazine (RANEXA) 1000 MG SR tablet, Take 1 tablet (1,000 mg total) by mouth every 12 (twelve) hours, Disp: 60 tablet, Rfl: 3    sodium picosulfate, magnesium oxide, citric acid (Clenpiq) oral solution, Take 175 mL (1 bottle) the evening before the colonoscopy, between 5 PM and 9 PM, followed by a second 175 mL bottle 5 hours before the colonoscopy., Disp: 350 mL, Rfl: 0    tamsulosin (Flomax) 0.4 mg, Take 1 capsule (0.4 mg total) by mouth daily with dinner, Disp: 30 capsule, Rfl: 1    traZODone (DESYREL) 50 mg tablet, Take 1 tablet (50 mg total) by mouth 2 (two) times a day, Disp: 90 tablet, Rfl: 1    polyethylene glycol (GOLYTELY) 4000 mL solution, Take 4,000 mL by mouth once for 1 dose As instructed by office for colonoscopy prep (Patient not taking: Reported on 11/9/2023), Disp: 4000 mL, Rfl: 0    polyethylene glycol (MIRALAX) 17 g packet, Take 17 g by mouth 2 (two) times a day for 7 days (Patient not taking: Reported on 11/9/2023), Disp: 238 g, Rfl: 0    Past Medical History:   Diagnosis Date    Acute on chronic diastolic congestive heart failure (HCC)     Altered gait     Alzheimer disease ([address])     per patients,,early onset     Angina pectoris ([address])     Anxiety     Arthritis     Brain aneurysm     coils placed    Cardiac disease     Chest pain 1/13/2016    Chronic kidney disease     Chronic pain     back/ right groin and rle- has morphine pump    Constipation     COPD (chronic obstructive pulmonary disease) (HCC)     Coronary artery disease     CPAP (continuous positive airway pressure) dependence     Decubital ulcer     sacral decub-occured 5/2019-sees wound care/debide in OR today 6/6/2019    Dependent on walker for ambulation     w/c for long distance    Depression     Diabetes mellitus (HCC)     insulin dependent    Dizziness     occ    Dysphagia     Enlarged prostate     Fall     Fall at home 5/3/2019    GERD (gastroesophageal reflux disease)     Heart failure ([address])     Hiatal hernia     Hx of gastric bypass 11/19/2018    Hypercholesterolemia     Hypertension     MI (myocardial infarction) ([address])     2017- stents x2    Migraine     Morbid obesity ([address])     gastric bypass sleeve 11/2018-wt loss 125 lb Neuropathy     Oxygen dependent     Q HS  2LPM with CPAP and prn during day 2-3 LPM     Pressure injury of skin     Pressure injury of skin of sacral region 6/3/2019    Added automatically from request for surgery [id number]    Renal disorder     Shortness of breath     Skin abnormality     sacral wound - covered with pad    Sleep apnea     Stented coronary artery     Stroke [facility])     vision loss b/l  2005, residual R leg weakness    Type 2 diabetes mellitus with diabetic neuropathy, with long-term current use of insulin ([address]) 10/18/2019    Type 2 diabetes mellitus with renal complication (HCC)     insulin dependent    Urinary frequency     Use of cane as ambulatory aid     Wears dentures     Wears glasses     Wears glasses     Wheelchair dependent        Past Surgical History:   Procedure Laterality Date    [address] [facility] [address]      with stents    CARDIAC CATHETERIZATION N/A 8/18/2023    Procedure: Cardiac Coronary Angiogram;  Surgeon: Bailey Patino MD;  Location: [facility]; Service: Cardiology    CEREBRAL ANEURYSM REPAIR      with coils    COLONOSCOPY      ESOPHAGOGASTRODUODENOSCOPY N/A 7/1/2016    Procedure: ESOPHAGOGASTRODUODENOSCOPY (EGD); Surgeon: Reggie Leo MD;  Location: [facility] GI [facility];   Service:     GASTRIC BYPASS  11/19/2018    HERNIA REPAIR      HIATAL HERNIA REPAIR      INFUSION PUMP IMPLANTATION Left     morphine    INTRATHECAL PUMP IMPLANTATION Left 7/9/2020    Procedure: REVISION INTRATHECAL PAIN PUMP POCKET, LEFT ABDOMEN;  Surgeon: John Soto MD;  Location: [facility] MAIN [facility];  Service: Neurosurgery    KNEE ARTHROSCOPY Right     KNEE ARTHROSCOPY Right     PERONEAL NERVE DECOMPRESSION Right     MS DEBRIDEMENT OPEN WOUND 20 SQ CM/< N/A 6/6/2019    Procedure: EXCISIONAL DEBRIDEMENT OF SACRAL DECUBITUS ULCER;  Surgeon: Rocío Meza MD;  Location[facility];  Service: General    MS ESOPHAGOGASTRODUODENOSCOPY TRANSORAL DIAGNOSTIC N/A 2/27/2017    Procedure: ESOPHAGOGASTRODUODENOSCOPY (EGD); Surgeon: Catrachito Hopson MD;  Location: [facility] GI [facility]; Service: Gastroenterology    NE ESOPHAGOGASTRODUODENOSCOPY TRANSORAL DIAGNOSTIC N/A 8/23/2018    Procedure: ESOPHAGOGASTRODUODENOSCOPY (EGD) with biopsy;  Surgeon: Catrachito Hopson MD;  [facility];   Service: Gastroenterology    NE IMPLTJ/RPLCMT ITHCL/EDRL DRUG NFS PRGRBL PUMP Left 10/13/2020    Procedure: EXPLORATION OF INTRATHECAL PAIN PUMP SYSTEM INTEGRITY FOR POSSIBLE REPLACEMENT OF CATHETER AND PUMP.;  Surgeon: Sven Garcia MD;  Location:  MAIN OR;  Service: Neurosurgery    NE IMPLTJ/RPLCMT ITHCL/EDRL DRUG NFS SUBQ RSVR N/A 1/19/2017    Procedure: REMOVAL / EXCHANGE INTRATHECAL PAIN PUMP;  Surgeon: Preston Kapoor MD;  [facility];  Service: Orthopedics    NE IMPLTJ/RPLCMT ITHCL/EDRL DRUG NFS SUBQ RSVR N/A 5/16/2016    Procedure: REPLACEMENT AND PROGRAM PUMP ;  Surgeon: Preston Kapoor MD;  [facility];  Service: Orthopedics    NE PRQ [address] NSTIM ELECTRODE ARRAY EPIDURAL Right 3/17/2021    Procedure: INSERTION THORACIC DORSAL COLUMN SPINAL CORD STIMULATOR PERCUTANEOUS W IMPLANTABLE PULSE GENERATOR, RIGHT;  Surgeon: Sven Garcia MD;  Location: [facility];  Service: Neurosurgery       Social History

## 2023-11-15 ENCOUNTER — TELEPHONE (OUTPATIENT)
Dept: OTHER | Facility: OTHER | Age: 63
End: 2023-11-15

## 2023-11-15 RX ORDER — SODIUM CHLORIDE 9 MG/ML
100 INJECTION, SOLUTION INTRAVENOUS CONTINUOUS
OUTPATIENT
Start: 2023-11-15

## 2023-11-15 NOTE — TELEPHONE ENCOUNTER
Patient is calling regarding cancelling a procedure.     Date/Time: 11/16/2023  9:00 am     Performing Physician: Marc Huber    Performing Physician/Nursing Supervisor Notified?:  YES [] NO [x]    Patient requesting call back to reschedule: YES [x] NO []

## 2023-11-20 ENCOUNTER — TELEPHONE (OUTPATIENT)
Dept: GASTROENTEROLOGY | Facility: CLINIC | Age: 63
End: 2023-11-20

## 2023-11-20 NOTE — TELEPHONE ENCOUNTER
Scheduled date of colonoscopy (as of today):01/23/2024  Physician performing colonoscopy: Dr Maximino Pepe  Location of colonoscopy: [facility]  Bowel prep: 2 day golytely-Miralax twice daily for 1 week prior to procedure  Bowel prep and diabetic instructions sent to patient via my chart  Clearances: Plavix    Patient requests a call to discuss bowel prep.

## 2023-11-27 DIAGNOSIS — M54.17 RADICULOPATHY, LUMBOSACRAL REGION: ICD-10-CM

## 2023-11-27 RX ORDER — METHOCARBAMOL 750 MG/1
750 TABLET, FILM COATED ORAL EVERY 6 HOURS PRN
Qty: 120 TABLET | Refills: 0 | Status: SHIPPED | OUTPATIENT
Start: 2023-11-27

## 2023-11-27 NOTE — TELEPHONE ENCOUNTER
I'm assuming you mean the methocarbamol - I confirmed with pt he is taking every 6 hours. States he needs it that often as the gabapentin is not helping at all.

## 2023-11-27 NOTE — TELEPHONE ENCOUNTER
Is pt taking that much cyclobenzaprine? I was under the impression it was as needed. It appears he is taking [id number] times/day every day. Please clarify.

## 2023-11-27 NOTE — TELEPHONE ENCOUNTER
I will refill this but this is not something he should be taking every day. If gabapentin is not working I suggest he discuss this with pain management.

## 2023-11-29 ENCOUNTER — TELEPHONE (OUTPATIENT)
Dept: GASTROENTEROLOGY | Facility: CLINIC | Age: 63
End: 2023-11-29

## 2023-11-29 DIAGNOSIS — J44.9 CHRONIC OBSTRUCTIVE PULMONARY DISEASE, UNSPECIFIED COPD TYPE (HCC): ICD-10-CM

## 2023-11-29 RX ORDER — ALBUTEROL SULFATE 1.25 MG/3ML
SOLUTION RESPIRATORY (INHALATION)
Qty: 375 ML | Refills: 1 | Status: SHIPPED | OUTPATIENT
Start: 2023-11-29

## 2023-11-29 NOTE — TELEPHONE ENCOUNTER
Daphney Ahumada wondering if other flavors ok other than lemon for Golytely. Let him know he could do crystal lite, Real-aid etc as long as no Red, Orange, or Purple food coloring added.

## 2023-12-06 DIAGNOSIS — N40.1 BENIGN PROSTATIC HYPERPLASIA (BPH) WITH STRAINING ON URINATION: ICD-10-CM

## 2023-12-06 DIAGNOSIS — R39.16 BENIGN PROSTATIC HYPERPLASIA (BPH) WITH STRAINING ON URINATION: ICD-10-CM

## 2023-12-06 RX ORDER — TAMSULOSIN HYDROCHLORIDE 0.4 MG/1
0.4 CAPSULE ORAL
Qty: 30 CAPSULE | Refills: 1 | Status: SHIPPED | OUTPATIENT
Start: 2023-12-06

## 2023-12-07 ENCOUNTER — TELEPHONE (OUTPATIENT)
Dept: FAMILY MEDICINE CLINIC | Facility: HOSPITAL | Age: 63
End: 2023-12-07

## 2023-12-07 DIAGNOSIS — I50.32 CHRONIC DIASTOLIC CONGESTIVE HEART FAILURE (HCC): ICD-10-CM

## 2023-12-07 DIAGNOSIS — I25.10 CORONARY ARTERY DISEASE INVOLVING NATIVE CORONARY ARTERY OF NATIVE HEART WITHOUT ANGINA PECTORIS: ICD-10-CM

## 2023-12-07 DIAGNOSIS — F51.04 PSYCHOPHYSIOLOGICAL INSOMNIA: ICD-10-CM

## 2023-12-07 RX ORDER — TRAZODONE HYDROCHLORIDE 50 MG/1
50 TABLET ORAL 2 TIMES DAILY
Qty: 90 TABLET | Refills: 1 | Status: SHIPPED | OUTPATIENT
Start: 2023-12-07

## 2023-12-07 RX ORDER — FUROSEMIDE 40 MG/1
40 TABLET ORAL DAILY
Qty: 30 TABLET | Refills: 11 | Status: SHIPPED | OUTPATIENT
Start: 2023-12-07

## 2023-12-07 NOTE — TELEPHONE ENCOUNTER
----- Message from [address] [place] [address] sent at 12/7/2023  1:53 PM EST -----  I received paperwork for his CPAP supplies. I cannot fill this out. I have no idea what settings etc since I have never treated him for ARLEEN. He was referred to pulmonary and he no showed for that appointment.

## 2023-12-09 DIAGNOSIS — I25.10 CORONARY ARTERY DISEASE INVOLVING NATIVE CORONARY ARTERY OF NATIVE HEART WITHOUT ANGINA PECTORIS: ICD-10-CM

## 2023-12-09 DIAGNOSIS — I50.32 CHRONIC DIASTOLIC CONGESTIVE HEART FAILURE (HCC): ICD-10-CM

## 2023-12-11 RX ORDER — POTASSIUM CHLORIDE 1500 MG/1
20 TABLET, EXTENDED RELEASE ORAL DAILY
Qty: 90 TABLET | Refills: 0 | Status: SHIPPED | OUTPATIENT
Start: 2023-12-11

## 2023-12-13 ENCOUNTER — RA CDI HCC (OUTPATIENT)
Dept: OTHER | Facility: HOSPITAL | Age: 63
End: 2023-12-13

## 2023-12-13 NOTE — PROGRESS NOTES
E66.01  [address] [facility] coding opportunities          Chart Reviewed number of suggestions sent to Provider: 1     Patients Insurance     Medicare Insurance: [employer]

## 2023-12-26 DIAGNOSIS — I50.32 CHRONIC DIASTOLIC CONGESTIVE HEART FAILURE (HCC): ICD-10-CM

## 2023-12-26 DIAGNOSIS — I25.10 CORONARY ARTERY DISEASE INVOLVING NATIVE CORONARY ARTERY OF NATIVE HEART WITHOUT ANGINA PECTORIS: ICD-10-CM

## 2023-12-26 RX ORDER — LOSARTAN POTASSIUM 50 MG/1
50 TABLET ORAL DAILY
Qty: 90 TABLET | Refills: 1 | Status: SHIPPED | OUTPATIENT
Start: 2023-12-26

## 2023-12-27 NOTE — PATIENT INSTRUCTIONS
If you have any problems with low blood sugars after being on protein shakes, Reduce Novolog to 22 units before protein shakes  Two nights before surgery, Reduce to Tresiba to 100 units  If any problems with low/high sugars before or after surgery, let us know and we will give you insulin instructions  Send BG log every 2 weeks or sooner if problems    Do lab testing in 3 months
pending SLP evaluation.  NPO at present

## 2023-12-27 NOTE — PROGRESS NOTES
Patient ID: Aristeo Hall is a 63 y.o. male Date of Birth 1960       Chief Complaint   Patient presents with    Diabetes     DFC      Foot Problem     DFC; neuropathy             Diagnosis:  1. Onychomycosis    2. Type 2 diabetes mellitus with diabetic neuropathy, with long-term current use of insulin (HCC)  -     Diabetic Shoe  -     ammonium lactate (LAC-HYDRIN) 12 % cream; Apply topically as needed for dry skin    3. Xerosis of skin  -     ammonium lactate (LAC-HYDRIN) 12 % cream; Apply topically as needed for dry skin      Patient presents for at-risk diabetic foot care.  Patient has no acute concerns today.  Patient has significant lower extremity risk due to neuropathy, parasthesia, edema, and trophic skin changes to the lower extremity. Most recent HBA1c was 5.8 on 9/19/2023, FBS this am 122, Last visit with PCP 9/19/2023.  Patient was given a prescription for 1 pair diabetic shoes and 3 pair custom molded inserts on 9/15/2023 he has misplaced his prescription request another 1.    On exam patient has thickened, hypertrophic, discolored, brittle toenails with subungual debris and tenderness x10   Callus: None  Patient does not have BL lower extremity edema  Patient's skin is atrophic, thickened nails, and decreased pedal hair  Patient has decreased pinprick and vibratory sensation to his feet and parasthesia  Patient does not have any  pedal ulcerations.  DP/PT are 2 out of 4 bilaterally    Today's treatment includes:  Debridement of toenails. Using nail nipper, chavo, and curette, nails were sharply debrided, reduced in thickness and length. Devitalized nail tissue and fungal debris excised and removed. Patient tolerated well.      Ammonium lactate 12% cream was ordered for patient's cirrhosis, he is to apply to top and bottom of feet, avoid in between the toes.    Patient given a prescription for 1 pair diabetic shoes and 3 pair custom molded inserts.    Discussed proper shoe gear, daily  "inspections of feet, and general foot health with patient. Patient has Q9 findings and is recommended for at risk foot care every 9-10 weeks.    Patients most recent complete clinical foot exam was on: 9/15/2023    The following portions of the patient's history were reviewed and updated as appropriate: allergies, current medications, past family history, past medical history, past social history, past surgical history, and problem list.      /80 (BP Location: Left arm, Patient Position: Sitting, Cuff Size: Adult)   Pulse 69   Ht 6' (1.829 m) Comment: verbal  Wt 132 kg (290 lb) Comment: verbal  BMI 39.33 kg/m²       No pertinent results found.      Mary Sanchez, BESSIE, DPM, FACFAS    Portions of the record may have been created with voice recognition software. Occasional wrong word or \"sound a like\" substitutions may have occurred due to the inherent limitations of voice recognition software. Read the chart carefully and recognize, using context, where substitutions have occurred.  "

## 2023-12-29 ENCOUNTER — OFFICE VISIT (OUTPATIENT)
Dept: PODIATRY | Facility: CLINIC | Age: 63
End: 2023-12-29
Payer: MEDICARE

## 2023-12-29 VITALS
HEIGHT: 72 IN | DIASTOLIC BLOOD PRESSURE: 80 MMHG | WEIGHT: 290 LBS | BODY MASS INDEX: 39.28 KG/M2 | SYSTOLIC BLOOD PRESSURE: 133 MMHG | HEART RATE: 69 BPM

## 2023-12-29 DIAGNOSIS — B35.1 ONYCHOMYCOSIS: Primary | ICD-10-CM

## 2023-12-29 DIAGNOSIS — L85.3 XEROSIS OF SKIN: ICD-10-CM

## 2023-12-29 DIAGNOSIS — Z79.4 TYPE 2 DIABETES MELLITUS WITH DIABETIC NEUROPATHY, WITH LONG-TERM CURRENT USE OF INSULIN (HCC): ICD-10-CM

## 2023-12-29 DIAGNOSIS — E11.40 TYPE 2 DIABETES MELLITUS WITH DIABETIC NEUROPATHY, WITH LONG-TERM CURRENT USE OF INSULIN (HCC): ICD-10-CM

## 2023-12-29 PROCEDURE — 11721 DEBRIDE NAIL 6 OR MORE: CPT | Performed by: PODIATRIST

## 2023-12-29 PROCEDURE — 99212 OFFICE O/P EST SF 10 MIN: CPT | Performed by: PODIATRIST

## 2023-12-29 RX ORDER — AMMONIUM LACTATE 12 G/100G
CREAM TOPICAL AS NEEDED
Qty: 385 G | Refills: 2 | Status: SHIPPED | OUTPATIENT
Start: 2023-12-29

## 2024-01-03 DIAGNOSIS — M54.17 RADICULOPATHY, LUMBOSACRAL REGION: ICD-10-CM

## 2024-01-03 RX ORDER — METHOCARBAMOL 750 MG/1
750 TABLET, FILM COATED ORAL EVERY 6 HOURS PRN
Qty: 120 TABLET | Refills: 0 | Status: SHIPPED | OUTPATIENT
Start: 2024-01-03

## 2024-01-04 ENCOUNTER — RA CDI HCC (OUTPATIENT)
Dept: OTHER | Facility: HOSPITAL | Age: 64
End: 2024-01-04

## 2024-01-04 NOTE — PROGRESS NOTES
HCC coding opportunities          Chart Reviewed number of suggestions sent to Provider: 2   E66.01  E11.22    Patients Insurance     Medicare Insurance: Medicare

## 2024-01-05 ENCOUNTER — APPOINTMENT (OUTPATIENT)
Dept: LAB | Facility: HOSPITAL | Age: 64
End: 2024-01-05
Payer: MEDICARE

## 2024-01-05 DIAGNOSIS — I25.10 CORONARY ARTERY DISEASE INVOLVING NATIVE CORONARY ARTERY OF NATIVE HEART WITHOUT ANGINA PECTORIS: ICD-10-CM

## 2024-01-05 DIAGNOSIS — I50.32 CHRONIC DIASTOLIC CONGESTIVE HEART FAILURE (HCC): ICD-10-CM

## 2024-01-05 DIAGNOSIS — Z79.899 ENCOUNTER FOR LONG-TERM (CURRENT) USE OF OTHER MEDICATIONS: ICD-10-CM

## 2024-01-05 LAB
ANION GAP SERPL CALCULATED.3IONS-SCNC: 14 MMOL/L
APTT PPP: 29 SECONDS (ref 23–37)
BASOPHILS # BLD AUTO: 0.06 THOUSANDS/ÂΜL (ref 0–0.1)
BASOPHILS NFR BLD AUTO: 0 % (ref 0–1)
BUN SERPL-MCNC: 11 MG/DL (ref 5–25)
CALCIUM SERPL-MCNC: 9.7 MG/DL (ref 8.4–10.2)
CHLORIDE SERPL-SCNC: 103 MMOL/L (ref 96–108)
CO2 SERPL-SCNC: 23 MMOL/L (ref 21–32)
CREAT SERPL-MCNC: 1.17 MG/DL (ref 0.6–1.3)
EOSINOPHIL # BLD AUTO: 0.55 THOUSAND/ÂΜL (ref 0–0.61)
EOSINOPHIL NFR BLD AUTO: 4 % (ref 0–6)
ERYTHROCYTE [DISTWIDTH] IN BLOOD BY AUTOMATED COUNT: 12.7 % (ref 11.6–15.1)
GFR SERPL CREATININE-BSD FRML MDRD: 66 ML/MIN/1.73SQ M
GLUCOSE P FAST SERPL-MCNC: 72 MG/DL (ref 65–99)
HCT VFR BLD AUTO: 47.2 % (ref 36.5–49.3)
HGB BLD-MCNC: 15.2 G/DL (ref 12–17)
IMM GRANULOCYTES # BLD AUTO: 0.12 THOUSAND/UL (ref 0–0.2)
IMM GRANULOCYTES NFR BLD AUTO: 1 % (ref 0–2)
INR PPP: 1.04 (ref 0.84–1.19)
LYMPHOCYTES # BLD AUTO: 2.71 THOUSANDS/ÂΜL (ref 0.6–4.47)
LYMPHOCYTES NFR BLD AUTO: 20 % (ref 14–44)
MCH RBC QN AUTO: 29.6 PG (ref 26.8–34.3)
MCHC RBC AUTO-ENTMCNC: 32.2 G/DL (ref 31.4–37.4)
MCV RBC AUTO: 92 FL (ref 82–98)
MONOCYTES # BLD AUTO: 0.87 THOUSAND/ÂΜL (ref 0.17–1.22)
MONOCYTES NFR BLD AUTO: 6 % (ref 4–12)
NEUTROPHILS # BLD AUTO: 9.23 THOUSANDS/ÂΜL (ref 1.85–7.62)
NEUTS SEG NFR BLD AUTO: 69 % (ref 43–75)
NRBC BLD AUTO-RTO: 0 /100 WBCS
PLATELET # BLD AUTO: 290 THOUSANDS/UL (ref 149–390)
PMV BLD AUTO: 10.8 FL (ref 8.9–12.7)
POTASSIUM SERPL-SCNC: 4 MMOL/L (ref 3.5–5.3)
PROTHROMBIN TIME: 13.5 SECONDS (ref 11.6–14.5)
RBC # BLD AUTO: 5.13 MILLION/UL (ref 3.88–5.62)
SODIUM SERPL-SCNC: 140 MMOL/L (ref 135–147)
WBC # BLD AUTO: 13.54 THOUSAND/UL (ref 4.31–10.16)

## 2024-01-05 PROCEDURE — 85610 PROTHROMBIN TIME: CPT

## 2024-01-05 PROCEDURE — 80048 BASIC METABOLIC PNL TOTAL CA: CPT

## 2024-01-05 PROCEDURE — 85025 COMPLETE CBC W/AUTO DIFF WBC: CPT

## 2024-01-05 PROCEDURE — 85730 THROMBOPLASTIN TIME PARTIAL: CPT

## 2024-01-05 PROCEDURE — 36415 COLL VENOUS BLD VENIPUNCTURE: CPT

## 2024-01-06 DIAGNOSIS — I21.3 STEMI (ST ELEVATION MYOCARDIAL INFARCTION) (HCC): ICD-10-CM

## 2024-01-08 ENCOUNTER — TELEPHONE (OUTPATIENT)
Dept: FAMILY MEDICINE CLINIC | Facility: HOSPITAL | Age: 64
End: 2024-01-08

## 2024-01-08 RX ORDER — CARVEDILOL 12.5 MG/1
12.5 TABLET ORAL 2 TIMES DAILY WITH MEALS
Qty: 60 TABLET | Refills: 3 | Status: SHIPPED | OUTPATIENT
Start: 2024-01-08

## 2024-01-08 RX ORDER — RANOLAZINE 1000 MG/1
TABLET, EXTENDED RELEASE ORAL
Qty: 60 TABLET | Refills: 3 | Status: SHIPPED | OUTPATIENT
Start: 2024-01-08

## 2024-01-08 NOTE — TELEPHONE ENCOUNTER
Pt is scheduled for a visit on 1/9/24, not sure if that was a routine follow up previously scheduled. Can you use this appt for clearance?

## 2024-01-08 NOTE — TELEPHONE ENCOUNTER
Patient left the following message:     Doctor Lisbeth Bailey. I had surgery on Friday and for my spinal cord stimulator and they say that my white blood count is too high that they need a clearance from her 2 do surgery on Friday. Can you please have give me a call [phone], Doctor Viridiana Toth for [facility] is doing the surgery for my spinal cord stimulator. Then number [phone] it's the surgery center. Please get back to me. This way I can have clearance for my surgery on Friday. Thank you very much. Ivan.  You received a voice mail from [employer]ARLEN[facility] [employer]INA.    He has an apt OVL on 1/9/24 can this be used for clearance?

## 2024-01-09 ENCOUNTER — TELEPHONE (OUTPATIENT)
Dept: FAMILY MEDICINE CLINIC | Facility: HOSPITAL | Age: 64
End: 2024-01-09

## 2024-01-09 ENCOUNTER — OFFICE VISIT (OUTPATIENT)
Dept: FAMILY MEDICINE CLINIC | Facility: HOSPITAL | Age: 64
End: 2024-01-09
Payer: MEDICARE

## 2024-01-09 VITALS
OXYGEN SATURATION: 99 % | TEMPERATURE: 97.5 F | DIASTOLIC BLOOD PRESSURE: 90 MMHG | SYSTOLIC BLOOD PRESSURE: 138 MMHG | HEART RATE: 82 BPM

## 2024-01-09 DIAGNOSIS — E66.01 MORBID OBESITY (HCC): ICD-10-CM

## 2024-01-09 DIAGNOSIS — I70.209 ARTERIOSCLEROSIS OF ARTERY OF EXTREMITY (HCC): ICD-10-CM

## 2024-01-09 DIAGNOSIS — E11.40 TYPE 2 DIABETES MELLITUS WITH DIABETIC NEUROPATHY, WITHOUT LONG-TERM CURRENT USE OF INSULIN (HCC): Primary | ICD-10-CM

## 2024-01-09 DIAGNOSIS — I20.9 ANGINA PECTORIS SYNDROME (HCC): ICD-10-CM

## 2024-01-09 DIAGNOSIS — Z01.818 PREOPERATIVE GENERAL PHYSICAL EXAMINATION: ICD-10-CM

## 2024-01-09 DIAGNOSIS — N18.2 STAGE 2 CHRONIC KIDNEY DISEASE: ICD-10-CM

## 2024-01-09 DIAGNOSIS — G62.9 NEUROPATHY: ICD-10-CM

## 2024-01-09 DIAGNOSIS — Z86.73 HISTORY OF CVA (CEREBROVASCULAR ACCIDENT): ICD-10-CM

## 2024-01-09 DIAGNOSIS — G43.709 CHRONIC MIGRAINE WITHOUT AURA WITHOUT STATUS MIGRAINOSUS, NOT INTRACTABLE: ICD-10-CM

## 2024-01-09 DIAGNOSIS — J44.9 CHRONIC OBSTRUCTIVE PULMONARY DISEASE, UNSPECIFIED COPD TYPE (HCC): ICD-10-CM

## 2024-01-09 DIAGNOSIS — F11.20 OPIOID DEPENDENCE, CONTINUOUS (HCC): ICD-10-CM

## 2024-01-09 DIAGNOSIS — D72.829 LEUKOCYTOSIS, UNSPECIFIED TYPE: ICD-10-CM

## 2024-01-09 DIAGNOSIS — I50.32 CHRONIC DIASTOLIC CONGESTIVE HEART FAILURE (HCC): ICD-10-CM

## 2024-01-09 DIAGNOSIS — F33.1 MAJOR DEPRESSIVE DISORDER, RECURRENT, MODERATE (HCC): Chronic | ICD-10-CM

## 2024-01-09 LAB
CREAT UR-MCNC: 211 MG/DL
LEFT EYE DIABETIC RETINOPATHY: ABNORMAL
LEFT EYE IMAGE QUALITY: ABNORMAL
LEFT EYE MACULAR EDEMA: ABNORMAL
LEFT EYE OTHER RETINOPATHY: ABNORMAL
MICROALBUMIN UR-MCNC: 9.2 MG/L
MICROALBUMIN/CREAT 24H UR: 4 MG/G CREATININE (ref 0–30)
RIGHT EYE DIABETIC RETINOPATHY: ABNORMAL
RIGHT EYE IMAGE QUALITY: ABNORMAL
RIGHT EYE MACULAR EDEMA: ABNORMAL
RIGHT EYE OTHER RETINOPATHY: ABNORMAL
SEVERITY (EYE EXAM): ABNORMAL
SL AMB POCT HEMOGLOBIN AIC: 6.2 (ref ?–6.5)

## 2024-01-09 PROCEDURE — 83036 HEMOGLOBIN GLYCOSYLATED A1C: CPT | Performed by: NURSE PRACTITIONER

## 2024-01-09 PROCEDURE — 82570 ASSAY OF URINE CREATININE: CPT | Performed by: NURSE PRACTITIONER

## 2024-01-09 PROCEDURE — 99215 OFFICE O/P EST HI 40 MIN: CPT | Performed by: NURSE PRACTITIONER

## 2024-01-09 PROCEDURE — 82043 UR ALBUMIN QUANTITATIVE: CPT | Performed by: NURSE PRACTITIONER

## 2024-01-09 NOTE — ASSESSMENT & PLAN NOTE
Lab Results   Component Value Date    EGFR 66 01/05/2024    EGFR 87 08/31/2023    EGFR 85 08/30/2023    CREATININE 1.17 01/05/2024    CREATININE 0.94 08/31/2023    CREATININE 0.96 08/30/2023   Stable.   Continue to monitor.

## 2024-01-09 NOTE — ASSESSMENT & PLAN NOTE
Wt Readings from Last 3 Encounters:   12/29/23 132 kg (290 lb)   11/09/23 132 kg (290 lb)   10/31/23 135 kg (297 lb)   Managed by cardiology

## 2024-01-09 NOTE — TELEPHONE ENCOUNTER
Yes, hi. This is [facility] for calling on behalf of Doctor Viridiana Toth MD. We're calling on behalf of one of our mutual patients, Aristeo Calderon. His date of birth is [birth year]. He apparently was at your practice today to get clearance for an operative procedure this coming Friday and Doctor Janey was concerned because on his CBC his white blood cell count was 13.54. If you could kindly give me a call back in regards to whether or not he is cleared for surgery I would greatly appreciate it. My name is Garo. My phone number is area code [phone]. It is Tuesday the 9th at 1:03 PM. Thank you very much. Have a great day.  You received a voice mail from [phone].

## 2024-01-09 NOTE — PROGRESS NOTES
Assessment/Plan:    Type 2 diabetes mellitus with diabetic neuropathy, without long-term current use of insulin ([facility])    Lab Results   Component Value Date    HGBA1C 6.2 01/09/2024   A1c in office up to 6.2.   His A1C has been well below diabetic range since gastric bypass.   He is not on any medications.   Retinal eye exam done in office today.   Pt gave urine specimen for urine microalbumin today.   UTD with foot exam.   Recheck labs in 6 months and f/u at that time.         COPD (chronic obstructive pulmonary disease) ([facility])  He missed pulmonology apptmt. He reports this is rescheduled.   His breathing is controlled.     Chronic diastolic congestive heart failure ([facility])  Wt Readings from Last 3 Encounters:   12/29/23 132 kg (290 lb)   11/09/23 132 kg (290 lb)   10/31/23 135 kg (297 lb)   Managed by cardiology            Chronic migraine without aura without status migrainosus, not intractable  No change in frequency.   Refer to neurology    Arteriosclerosis of artery of extremity ([facility])  Managed by cardiology    Neuropathy  On gabapentin per pain management.   Uncontrolled.   Refer to neurology    Stage 2 chronic kidney disease  Lab Results   Component Value Date    EGFR 66 01/05/2024    EGFR 87 08/31/2023    EGFR 85 08/30/2023    CREATININE 1.17 01/05/2024    CREATININE 0.94 08/31/2023    CREATININE 0.96 08/30/2023   Stable.   Continue to monitor.     Major depressive disorder, recurrent, moderate ([facility])  He is not on any medication.   Mood is pain related.     Morbid obesity ([facility])  S/p gastric bypass    History of CVA (cerebrovascular accident)  CVA a few years ago with vision loss.     Opioid dependence, continuous ([facility])  Managed by pain management.        Diagnoses and all orders for this visit:    Type 2 diabetes mellitus with diabetic neuropathy, without long-term current use of insulin ([facility])  -     IRIS Diabetic eye exam  -     CBC and differential; Future  -     Comprehensive metabolic panel; Future  -      Hemoglobin A1C; Future  -     Lipid Panel with Direct LDL reflex; Future  -     TSH, 3rd generation with Free T4 reflex; Future  -     Cancel: Microalbumin, Random Urine (W/Creatinine)  -     Albumin / creatinine urine ratio  -     Hemoglobin A1C With EAG; Future  -     POCT hemoglobin A1c    Preoperative general physical examination    Chronic obstructive pulmonary disease, unspecified COPD type (HCC)    Chronic diastolic congestive heart failure (HCC)    Chronic migraine without aura without status migrainosus, not intractable  -     Ambulatory Referral to Neurology; Future    Stage 2 chronic kidney disease    Major depressive disorder, recurrent, moderate (HCC)    Morbid obesity (HCC)    Opioid dependence, continuous (HCC)    Arteriosclerosis of artery of extremity (HCC)    Angina pectoris syndrome (HCC)    History of CVA (cerebrovascular accident)    Neuropathy  -     Ambulatory Referral to Neurology; Future    Leukocytosis, unspecified type  Comments:  No s/s infection.   OK to proceed with spinal stimulator battery exchange.          Subjective:      Patient ID: Aristeo Hall is a 63 y.o. male.    H/o CVA a few years ago.   No showed for pulm. States it was rescheduled. Does have sob but manageable. Needs new CPAP so not wearing it. No wheezing.   Hospitalized for myocarditis in October. Following with cardiology. Will get angina symptoms when he gets excited and will take NTG.   On Lasix. He does still have MAKENZIE.   Gets 6 migraines/month which is unchanged. Used to get Botox injections but insurance stopped covering it. No longer following with neurology.   Having spinal stimulator batter exchanged on Friday. WBC count high. Denies feeling ill. No fever or chills. No recent illness. WBC was elevated in October but slightly higher.   Pain management managed morphine pump. He will now take over Robaxin prescription.         The following portions of the patient's history were reviewed and updated as  appropriate: allergies, current medications, past family history, past medical history, past social history, past surgical history and problem list.    Review of Systems   Constitutional:  Positive for fatigue. Negative for unexpected weight change.   Eyes:  Positive for visual disturbance (chronic).   Respiratory:  Negative for shortness of breath.    Cardiovascular:  Positive for chest pain and leg swelling. Negative for palpitations.   Musculoskeletal:  Positive for arthralgias and back pain. Negative for myalgias.   Neurological:  Positive for numbness. Negative for dizziness, weakness, light-headedness and headaches.   Psychiatric/Behavioral:  Positive for dysphoric mood.          Objective:  Vitals:    01/09/24 1048   BP: 138/90   Pulse: 82   Temp: 97.5 °F (36.4 °C)   SpO2: 99%      Physical Exam  Vitals reviewed.   Constitutional:       Appearance: Normal appearance. He is obese.   HENT:      Right Ear: Tympanic membrane, ear canal and external ear normal.      Left Ear: Tympanic membrane, ear canal and external ear normal.      Mouth/Throat:      Mouth: Mucous membranes are moist.      Pharynx: Oropharynx is clear.   Eyes:      Conjunctiva/sclera: Conjunctivae normal.      Pupils: Pupils are equal, round, and reactive to light.   Cardiovascular:      Rate and Rhythm: Normal rate and regular rhythm.      Heart sounds: Normal heart sounds. No murmur heard.  Pulmonary:      Effort: Pulmonary effort is normal.      Breath sounds: Normal breath sounds.   Abdominal:      General: Abdomen is protuberant. Bowel sounds are normal.      Palpations: Abdomen is soft. There is no hepatomegaly or splenomegaly.      Tenderness: There is no abdominal tenderness.   Lymphadenopathy:      Cervical: No cervical adenopathy.   Skin:     General: Skin is warm and dry.   Neurological:      Mental Status: He is alert and oriented to person, place, and time.   Psychiatric:         Mood and Affect: Mood normal.         Behavior:  Behavior normal.         Thought Content: Thought content normal.         Judgment: Judgment normal.

## 2024-01-09 NOTE — ASSESSMENT & PLAN NOTE
Lab Results   Component Value Date    HGBA1C 6.2 01/09/2024   A1c in office up to 6.2.   His A1C has been well below diabetic range since gastric bypass.   He is not on any medications.   Retinal eye exam done in office today.   Pt gave urine specimen for urine microalbumin today.   UTD with foot exam.   Recheck labs in 6 months and f/u at that time.

## 2024-01-15 ENCOUNTER — TELEPHONE (OUTPATIENT)
Age: 64
End: 2024-01-15

## 2024-01-15 NOTE — TELEPHONE ENCOUNTER
Patients GI provider:  Dr. Montano    Number to return call: [phone]    Reason for call: Pt called to cancel colon and stated he only wanted the EGD. He would like someone to order the Cologuard for him and if it comes back positive he will reschedule.  Please reach out to pt    Scheduled procedure/appointment date if applicable: Apt/procedure 01/23/24

## 2024-01-17 NOTE — TELEPHONE ENCOUNTER
1-17-24 I spoke to Aristeo. Told him that Dr malin said cologaurd would be positive due to rectal bleeding. Aristeo stated it was only in the past when he was constipated and straining. He is taking stool softeners and has not been having an issue. He will discuss at EGD next week.

## 2024-01-23 ENCOUNTER — HOSPITAL ENCOUNTER (OUTPATIENT)
Dept: GASTROENTEROLOGY | Facility: HOSPITAL | Age: 64
Setting detail: OUTPATIENT SURGERY
Discharge: HOME | End: 2024-01-23
Admitting: INTERNAL MEDICINE
Payer: MEDICARE

## 2024-01-23 ENCOUNTER — ANESTHESIA EVENT (OUTPATIENT)
Dept: GASTROENTEROLOGY | Facility: HOSPITAL | Age: 64
End: 2024-01-23

## 2024-01-23 ENCOUNTER — ANESTHESIA (OUTPATIENT)
Dept: GASTROENTEROLOGY | Facility: HOSPITAL | Age: 64
End: 2024-01-23

## 2024-01-23 VITALS
TEMPERATURE: 98.2 F | WEIGHT: 302 LBS | BODY MASS INDEX: 40.9 KG/M2 | DIASTOLIC BLOOD PRESSURE: 88 MMHG | SYSTOLIC BLOOD PRESSURE: 163 MMHG | RESPIRATION RATE: 16 BRPM | HEART RATE: 66 BPM | OXYGEN SATURATION: 92 % | HEIGHT: 72 IN

## 2024-01-23 DIAGNOSIS — Z12.11 COLON CANCER SCREENING: Primary | ICD-10-CM

## 2024-01-23 DIAGNOSIS — R13.19 ESOPHAGEAL DYSPHAGIA: ICD-10-CM

## 2024-01-23 PROCEDURE — 43239 EGD BIOPSY SINGLE/MULTIPLE: CPT | Performed by: INTERNAL MEDICINE

## 2024-01-23 RX ORDER — SODIUM CHLORIDE 9 MG/ML
INJECTION, SOLUTION INTRAVENOUS CONTINUOUS PRN
Status: DISCONTINUED | OUTPATIENT
Start: 2024-01-23 | End: 2024-01-23

## 2024-01-23 RX ORDER — KETAMINE HYDROCHLORIDE 50 MG/ML
INJECTION, SOLUTION INTRAMUSCULAR; INTRAVENOUS AS NEEDED
Status: DISCONTINUED | OUTPATIENT
Start: 2024-01-23 | End: 2024-01-23

## 2024-01-23 RX ORDER — PROPOFOL 10 MG/ML
INJECTION, EMULSION INTRAVENOUS AS NEEDED
Status: DISCONTINUED | OUTPATIENT
Start: 2024-01-23 | End: 2024-01-23

## 2024-01-23 RX ORDER — LIDOCAINE HYDROCHLORIDE 10 MG/ML
INJECTION, SOLUTION EPIDURAL; INFILTRATION; INTRACAUDAL; PERINEURAL AS NEEDED
Status: DISCONTINUED | OUTPATIENT
Start: 2024-01-23 | End: 2024-01-23

## 2024-01-23 RX ADMIN — LIDOCAINE HYDROCHLORIDE 100 MG: 10 INJECTION, SOLUTION EPIDURAL; INFILTRATION; INTRACAUDAL; PERINEURAL at 12:44

## 2024-01-23 RX ADMIN — KETAMINE HYDROCHLORIDE 20 MG: 50 INJECTION, SOLUTION INTRAMUSCULAR; INTRAVENOUS at 12:44

## 2024-01-23 RX ADMIN — SODIUM CHLORIDE: 0.9 INJECTION, SOLUTION INTRAVENOUS at 12:37

## 2024-01-23 RX ADMIN — PROPOFOL 50 MG: 10 INJECTION, EMULSION INTRAVENOUS at 12:51

## 2024-01-23 RX ADMIN — PROPOFOL 100 MG: 10 INJECTION, EMULSION INTRAVENOUS at 12:48

## 2024-01-23 NOTE — H&P
History and Physical -  [facility]  Aristeo Hall 63 y.o. male MRN: [id number]    HPI: Aristeo Hall is a 63 y.o. male who presents for upper endoscopy due to dysphagia status post sleeve gastrectomy    REVIEW OF SYSTEMS: Per the HPI, and otherwise unremarkable.    Historical Information   Past Medical History:   Diagnosis Date    Acute on chronic diastolic congestive heart failure (HCC)     Altered gait     Alzheimer disease ([facility])     per patients,,early onset     Angina pectoris ([facility])     Anxiety     Arthritis     Brain aneurysm     coils placed    Cardiac disease     Chest pain 1/13/2016    Chronic kidney disease     Chronic pain     back/ right groin and rle- has morphine pump    Constipation     COPD (chronic obstructive pulmonary disease) ([facility])     Coronary artery disease     CPAP (continuous positive airway pressure) dependence     Decubital ulcer     sacral decub-occured 5/2019-sees wound care/debide in OR today 6/6/2019    Dependent on walker for ambulation     w/c for long distance    Depression     Diabetes mellitus ([facility])     insulin dependent    Dizziness     occ    Dysphagia     Enlarged prostate     Fall     Fall at home 5/3/2019    GERD (gastroesophageal reflux disease)     Heart failure ([facility])     Hiatal hernia     Hx of gastric bypass 11/19/2018    Hypercholesterolemia     Hypertension     MI (myocardial infarction) ([facility])     2017- stents x2    Migraine     Morbid obesity ([facility])     gastric bypass sleeve 11/2018-wt loss 125 lb    Neuropathy     Oxygen dependent     Q HS  2LPM with CPAP and prn during day 2-3 LPM     Pressure injury of skin     Pressure injury of skin of sacral region 6/3/2019    Added automatically from request for surgery [id number]    Renal disorder     Shortness of breath     Skin abnormality     sacral wound - covered with pad    Sleep apnea     Stented coronary artery     Stroke ([facility])     vision loss b/l  2005, residual R leg weakness    Type  2 diabetes mellitus with diabetic neuropathy, with long-term current use of insulin (HCC) 10/18/2019    Type 2 diabetes mellitus with renal complication (HCC)     insulin dependent    Urinary frequency     Use of cane as ambulatory aid     Wears dentures     Wears glasses     Wears glasses     Wheelchair dependent      Past Surgical History:   Procedure Laterality Date    BACK SURGERY      BRAIN SURGERY      CARDIAC CATHETERIZATION      with stents    CARDIAC CATHETERIZATION N/A 8/18/2023    Procedure: Cardiac Coronary Angiogram;  Surgeon: Hroacio Weiner MD;  Location: [facility];  Service: Cardiology    CEREBRAL ANEURYSM REPAIR      with coils    COLONOSCOPY      ESOPHAGOGASTRODUODENOSCOPY N/A 7/1/2016    Procedure: ESOPHAGOGASTRODUODENOSCOPY (EGD);  Surgeon: Reno Christiansen MD;  Location: [facility];  Service:     GASTRIC BYPASS  11/19/2018    HERNIA REPAIR      HIATAL HERNIA REPAIR      INFUSION PUMP IMPLANTATION Left     morphine    INTRATHECAL PUMP IMPLANTATION Left 7/9/2020    Procedure: REVISION INTRATHECAL PAIN PUMP POCKET, LEFT ABDOMEN;  Surgeon: Homero Cho MD;  Location: [facility];  Service: Neurosurgery    KNEE ARTHROSCOPY Right     KNEE ARTHROSCOPY Right     PERONEAL NERVE DECOMPRESSION Right     NH DEBRIDEMENT OPEN WOUND FIRST 20 SQ CM/< N/A 6/6/2019    Procedure: EXCISIONAL DEBRIDEMENT OF SACRAL DECUBITUS ULCER;  Surgeon: Siddhartha Omer MD;  Location: [facility];  Service: General    NH ESOPHAGOGASTRODUODENOSCOPY TRANSORAL DIAGNOSTIC N/A 2/27/2017    Procedure: ESOPHAGOGASTRODUODENOSCOPY (EGD);  Surgeon: Reno Christiansen MD;  Location: [facility];  Service: Gastroenterology    NH ESOPHAGOGASTRODUODENOSCOPY TRANSORAL DIAGNOSTIC N/A 8/23/2018    Procedure: ESOPHAGOGASTRODUODENOSCOPY (EGD) with biopsy;  Surgeon: Reno Christiansen MD;  Location: [facility];  Service: Gastroenterology    NH IMPLTJ/RPLCMT ITHCL/EDRL DRUG NFS PRGRBL PUMP Left 10/13/2020    Procedure: EXPLORATION OF INTRATHECAL PAIN PUMP SYSTEM  INTEGRITY FOR POSSIBLE REPLACEMENT OF CATHETER AND PUMP.;  Surgeon: Homero Cho MD;  Location: [facility];  Service: Neurosurgery    KS IMPLTJ/RPLCMT ITHCL/EDRL DRUG NFS SUBQ RSVR N/A 1/19/2017    Procedure: REMOVAL / EXCHANGE INTRATHECAL PAIN PUMP;  Surgeon: Que Leonard MD;  [facility];  Service: Orthopedics    KS IMPLTJ/RPLCMT ITHCL/EDRL DRUG NFS SUBQ RSVR N/A 5/16/2016    Procedure: REPLACEMENT AND PROGRAM PUMP ;  Surgeon: Que Leonard MD;  [facility];  Service: Orthopedics    KS PRQ IMPLTJ NSTIM ELECTRODE ARRAY EPIDURAL Right 3/17/2021    Procedure: INSERTION THORACIC DORSAL COLUMN SPINAL CORD STIMULATOR PERCUTANEOUS W IMPLANTABLE PULSE GENERATOR, RIGHT;  Surgeon: Homero Cho MD;  Location: [facility];  Service: Neurosurgery     Social History   Social History     Substance and Sexual Activity   Alcohol Use Not Currently     Social History     Substance and Sexual Activity   Drug Use Not Currently    Types: Marijuana     Social History     Tobacco Use   Smoking Status Never   Smokeless Tobacco Never     Family History   Problem Relation Age of Onset    Diabetes unspecified Mother     Diabetes unspecified Brother     Diabetes unspecified Paternal Uncle     Diabetes unspecified Maternal Grandmother     Diabetes unspecified Paternal Grandmother     Diabetes unspecified Brother     Heart attack Father        Meds/Allergies       Current Outpatient Medications:     albuterol (ACCUNEB) 1.25 MG/3ML nebulizer solution    amLODIPine (NORVASC) 5 mg tablet    ammonium lactate (LAC-HYDRIN) 12 % cream    aspirin 81 mg chewable tablet    atorvastatin (LIPITOR) 80 mg tablet    baclofen 10 mg tablet    CALCIUM PO    carvedilol (COREG) 12.5 mg tablet    clopidogrel (PLAVIX) 75 mg tablet    Cyanocobalamin (VITAMIN B-12 PO)    Fluticasone-Salmeterol (Advair Diskus) 500-50 mcg/dose inhaler    folic acid (FOLVITE) 1 mg tablet    gabapentin (NEURONTIN) 300 mg capsule    gabapentin (NEURONTIN) 600 MG tablet     Klor-Con M20 20 MEQ tablet    losartan (COZAAR) 50 mg tablet    methocarbamol (ROBAXIN) 750 mg tablet    Morphine Sulfate Microinfusion (MORPHINE SULF MICROINFUSION PF IJ)    naloxegol oxalate (MOVANTIK) 25 MG tablet    nitroglycerin (NITROSTAT) 0.4 mg SL tablet    omeprazole (PriLOSEC) 40 MG capsule    ondansetron (ZOFRAN) 4 mg tablet    ranolazine (RANEXA) 1000 MG SR tablet    tamsulosin (FLOMAX) 0.4 mg    traZODone (DESYREL) 50 mg tablet    bisacodyl (DULCOLAX) 5 mg EC tablet    furosemide (LASIX) 40 mg tablet    hydrocortisone 1 % lotion    lidocaine (LIDODERM) 5 %    nystatin (MYCOSTATIN) cream    polyethylene glycol (GOLYTELY) 4000 mL solution    polyethylene glycol (MIRALAX) 17 g packet    sodium picosulfate, magnesium oxide, citric acid (Clenpiq) oral solution    Not on File    Objective     /78   Pulse 66   Temp 98.2 °F (36.8 °C) (Oral)   Resp 18   Ht 6' (1.829 m)   Wt (!) 137 kg (302 lb)   SpO2 94%   BMI 40.96 kg/m²     PHYSICAL EXAM    Gen: NAD AAOx3  Head: Normocephalic, Atraumatic  CV: S1S2 RRR no m/r/g  CHEST: Clear b/l no c/r/w  ABD: soft, +BS NT/ND  EXT: no edema    ASSESSMENT/PLAN:  This is a 63 y.o. year old male here for upper endoscopy and possible dilation, and he is stable and optimized for his procedure.

## 2024-01-23 NOTE — ANESTHESIA POSTPROCEDURE EVALUATION
Post-Op Assessment Note    CV Status:  Stable  Pain Score: 0    Pain management: adequate       Mental Status:  Sleepy   Hydration Status:  Stable   PONV Controlled:  None   Airway Patency:  Patent     Post Op Vitals Reviewed: Yes    No anethesia notable event occurred.    Staff: Anesthesiologist, CRNA   Comments: vss            /78 (01/23/24 1259)    Temp      Pulse 64 (01/23/24 1259)   Resp 13 (01/23/24 1259)    SpO2 90 % (01/23/24 1259)

## 2024-01-23 NOTE — ANESTHESIA PREPROCEDURE EVALUATION
Procedure:  EGD    Relevant Problems   CARDIO   (+) Chest pain   (+) Chronic diastolic congestive heart failure (HCC)   (+) Chronic migraine without aura without status migrainosus, not intractable   (+) Coronary artery disease   (+) Coronary artery disease involving native coronary artery   (+) Hyperlipidemia      ENDO   (+) Type 2 diabetes mellitus with diabetic neuropathy, without long-term current use of insulin (HCC)      GI/HEPATIC   (+) Esophageal dysphagia   (+) GERD (gastroesophageal reflux disease)   (+) Hiatal hernia      /RENAL   (+) Stage 2 chronic kidney disease      MUSCULOSKELETAL   (+) Hiatal hernia   (+) Primary osteoarthritis of both knees      NEURO/PSYCH   (+) Chronic migraine without aura without status migrainosus, not intractable   (+) Chronic pain disorder   (+) Major depressive disorder, recurrent, moderate (HCC)   (+) Opioid dependence, continuous (HCC)   (+) Stroke (HCC)      PULMONARY   (+) COPD (chronic obstructive pulmonary disease) (HCC)   (+) Obstructive sleep apnea syndrome   (+) Sleep apnea        Physical Exam    Airway    Mallampati score: III  TM Distance: >3 FB  Neck ROM: full     Dental   No notable dental hx     Cardiovascular      Pulmonary      Other Findings        Anesthesia Plan  ASA Score- 3     Anesthesia Type- IV sedation with anesthesia with ASA Monitors.         Additional Monitors:     Airway Plan:            Plan Factors-    Chart reviewed.    Patient summary reviewed.                  Induction- intravenous.    Postoperative Plan-     Informed Consent- Anesthetic plan and risks discussed with patient.  I personally reviewed this patient with the CRNA. Discussed and agreed on the Anesthesia Plan with the CRNA..

## 2024-01-27 DIAGNOSIS — R39.16 BENIGN PROSTATIC HYPERPLASIA (BPH) WITH STRAINING ON URINATION: ICD-10-CM

## 2024-01-27 DIAGNOSIS — N40.1 BENIGN PROSTATIC HYPERPLASIA (BPH) WITH STRAINING ON URINATION: ICD-10-CM

## 2024-01-27 RX ORDER — TAMSULOSIN HYDROCHLORIDE 0.4 MG/1
0.4 CAPSULE ORAL
Qty: 30 CAPSULE | Refills: 1 | Status: SHIPPED | OUTPATIENT
Start: 2024-01-27

## 2024-01-29 ENCOUNTER — TELEPHONE (OUTPATIENT)
Dept: GASTROENTEROLOGY | Facility: CLINIC | Age: 64
End: 2024-01-29

## 2024-01-31 ENCOUNTER — OFFICE VISIT (OUTPATIENT)
Dept: GASTROENTEROLOGY | Facility: CLINIC | Age: 64
End: 2024-01-31
Payer: MEDICARE

## 2024-01-31 VITALS
HEIGHT: 72 IN | BODY MASS INDEX: 41.47 KG/M2 | DIASTOLIC BLOOD PRESSURE: 89 MMHG | WEIGHT: 306.2 LBS | SYSTOLIC BLOOD PRESSURE: 200 MMHG

## 2024-01-31 DIAGNOSIS — R13.19 ESOPHAGEAL DYSPHAGIA: ICD-10-CM

## 2024-01-31 DIAGNOSIS — R10.10 UPPER ABDOMINAL PAIN: Primary | ICD-10-CM

## 2024-01-31 DIAGNOSIS — Z12.11 COLON CANCER SCREENING: ICD-10-CM

## 2024-01-31 DIAGNOSIS — T40.2X5A CONSTIPATION DUE TO OPIOID THERAPY: ICD-10-CM

## 2024-01-31 DIAGNOSIS — K21.9 GASTROESOPHAGEAL REFLUX DISEASE WITHOUT ESOPHAGITIS: ICD-10-CM

## 2024-01-31 DIAGNOSIS — Z79.02 LONG TERM CURRENT USE OF CLOPIDOGREL: ICD-10-CM

## 2024-01-31 DIAGNOSIS — K59.03 CONSTIPATION DUE TO OPIOID THERAPY: ICD-10-CM

## 2024-01-31 DIAGNOSIS — Z90.3 H/O GASTRIC SLEEVE: ICD-10-CM

## 2024-01-31 DIAGNOSIS — K44.9 HIATAL HERNIA: ICD-10-CM

## 2024-01-31 PROCEDURE — 99214 OFFICE O/P EST MOD 30 MIN: CPT | Performed by: PHYSICIAN ASSISTANT

## 2024-01-31 RX ORDER — POLYETHYLENE GLYCOL 3350 17 G/17G
17 POWDER, FOR SOLUTION ORAL 2 TIMES DAILY
Qty: 850 G | Refills: 5 | Status: SHIPPED | OUTPATIENT
Start: 2024-01-31

## 2024-01-31 RX ORDER — DOCUSATE SODIUM 100 MG/1
100 CAPSULE, LIQUID FILLED ORAL 2 TIMES DAILY
Qty: 180 CAPSULE | Refills: 3 | Status: SHIPPED | OUTPATIENT
Start: 2024-01-31

## 2024-01-31 NOTE — PATIENT INSTRUCTIONS
- Get esophageal manometry and abdominal ultrasound  -Continue omeprazole 40 mg twice daily  -Eat small frequent meals low-fat, low fiber  -Zofran as needed for nausea  -Continue Colace twice daily Movantik 25 mg daily, add MiraLAX twice daily  -Follow-up 2 months with physician

## 2024-01-31 NOTE — PROGRESS NOTES
[facility] - Outpatient Follow-up Note  Aristeo Hall 63 y.o. male MRN: [id number]  Encounter: [id number]    ASSESSMENT AND PLAN:    1. Constipation due to opioid therapy  Patient with chronic constipation likely due to opioids (dilaudid) from intrathecal pain pump he has failed multiple medications including Colace, Miralax, Dulcolax, senna, lactulose,  suppositories, enemas.  Advised to get plenty of fluid in his diet and minimize narcotics if possible.  No improvement on Colace twice daily and Movantik 25 mg daily.  Will add MiraLAX twice daily.    - polyethylene glycol (GLYCOLAX) 17 GM/SCOOP powder; Take 17 g by mouth 2 (two) times a day  Dispense: 850 g; Refill: 5  - docusate sodium (COLACE) 100 mg capsule; Take 1 capsule (100 mg total) by mouth 2 (two) times a day  Dispense: 180 capsule; Refill: 3  - naloxegol oxalate (MOVANTIK) 25 MG tablet; Take 1 tablet (25 mg total) by mouth daily in the early morning  Dispense: 30 tablet; Refill: 5    2. Upper abdominal pain  Complaining of postprandial upper abdominal pain.  This may be due to hiatal hernia.  Will check abdominal ultrasound to exclude hepatobiliary disease.    - US abdomen complete; Future    3. Gastroesophageal reflux disease without esophagitis  4. Esophageal dysphagia  5. Hiatal hernia  6. H/O gastric sleeve  Reflux controlled with PPI 40 mg twice daily.  He continues to have dysphagia to solids with regurgitation. 1/2024 EGD showed irregular Z-line  S/P gastric sleeve, no biopsy taken.  Esophageal manometry recommended to assess for motility disorder as pt has h/o gastric sleeve which he was encouraged to complete. In the interim patient advised to eat slowly, chew thoroughly and avoid the foods that exacerbate their symptoms. An antireflux regimen and lifestyle modifications were reviewed.    - Esophageal manometry; Future  -cont omeprazole 40 mg BID    7. Colon cancer screening  Patient unlikely to  prep well given severe constipation.  He canceled the colonoscopy and Cologuard was ordered which he was encouraged to complete.    8. Long term current use of clopidogrel  If procedure needed will need to hold Plavix 5 days prior    The case was discussed with Dr. Jose Montano who agrees with the above dictation and plan.     Follow up appointment: 8w OV with Dr. Montano  ______________________________________________________________________    Chief Complaint   Patient presents with   • Follow up EGD       HPI:     Patient is a 63 y.o. male with a significant PMH of DM, CHF, CVA > 5 y ago, CAD stent 2022 on ASA/plavix , myocarditis, ARLEEN, dementia, CKD, opioid dependence with intrathecal pain pump, chronic respiratory failure, copd, depression, BPH, obesity S/P gastric sleeve 11/2019. Pt A & O X 3, consents for self being evaluated in follow up for multiple GI issues.      COLON 2017 pt unsure of results, record unavailable.     8/2023 CBC, CMP essentially WNL.     8/2023 CTA C/A/P NAD.   9/2020 CT A/P with contrast S/P sleeve, possible leakage form pain pump.    Last seen in September 2023 had dysphagia to solids for 6 months with chest pain and regurgitation as well as breakthrough reflux on Prilosec 40 mg daily.  Patient was on aspirin and Plavix. He was having constipation and rectal bleeding on narcotics.  Panendoscopy ordered advised to increase PPI to twice daily and start Movantik.    January 2024 EGD performed showing irregular Z-line  S/P gastric sleeve, no biopsy taken.  Esophageal manometry ordered patient declined colonoscopy and cologuard ordered.    1/2024 INR normal.  CBC showed leukocytosis 13.5 BMP showed creatinine 1.17 GFR 66.    Patient still having dysphagia to solids with regurgitation especially rice Pasta and meat no trouble with liquids.  He now admits he does not wear his dentures as he does not feel like dealing with the glue.  He chews his food well and eats small bites.  Reflux seems  to be controlled with omeprazole 40 mg twice daily.  He remains on aspirin and Plavix daily.  He takes Zofran rarely for nausea.  Admits to intermittent 7/10 upper abdominal pain after eating.  Still has constipation with hard stools every 5 days without bleeding on Colace twice daily and Movantik daily.  States morphine recently changed to Dilaudid in his pain pump.  Eating okay and has gained weight which he associates with water weight.  He is currently taking Lasix 40 mg twice daily.    Pt denies all other GI and constitutional symptoms.      ROS:   Constitutional: denies fatigue, fever.  HEENT: denies visual disturbance, postnasal drip, sore throat.  Respiratory: denies cough, +YAO  Cardiovascular: +chest pain, leg swelling.  Gastrointestinal: as noted above in HPI.  : denies difficulty urinating, dysuria.  Musculoskeletal: +arthralgias, back pain.  Neurological: denies dizziness, syncope.  Psychiatric: denies confusion, anxiety.    Historical Information   Past Medical History:   Diagnosis Date   • Acute on chronic diastolic congestive heart failure (HCC)    • Altered gait    • Alzheimer disease ([facility])     per patients,,early onset    • Angina pectoris ([facility])    • Anxiety    • Arthritis    • Brain aneurysm     coils placed   • Cardiac disease    • Chest pain 1/13/2016   • Chronic kidney disease    • Chronic pain     back/ right groin and rle- has morphine pump   • Constipation    • COPD (chronic obstructive pulmonary disease) ([facility])    • Coronary artery disease    • CPAP (continuous positive airway pressure) dependence    • Decubital ulcer     sacral decub-occured 5/2019-sees wound care/debide in OR today 6/6/2019   • Dependent on walker for ambulation     w/c for long distance   • Depression    • Diabetes mellitus ([facility])     insulin dependent   • Dizziness     occ   • Dysphagia    • Enlarged prostate    • Fall    • Fall at home 5/3/2019   • GERD (gastroesophageal reflux disease)    • Heart failure ([facility])    •  Hiatal hernia    • Hx of gastric bypass 11/19/2018   • Hypercholesterolemia    • Hypertension    • MI (myocardial infarction) ([facility])     2017- stents x2   • Migraine    • Morbid obesity ([facility])     gastric bypass sleeve 11/2018-wt loss 125 lb   • Neuropathy    • Oxygen dependent     Q HS  2LPM with CPAP and prn during day 2-3 LPM    • Pressure injury of skin    • Pressure injury of skin of sacral region 6/3/2019    Added automatically from request for surgery [id number]   • Renal disorder    • Shortness of breath    • Skin abnormality     sacral wound - covered with pad   • Sleep apnea    • Stented coronary artery    • Stroke ([facility])     vision loss b/l  2005, residual R leg weakness   • Type 2 diabetes mellitus with diabetic neuropathy, with long-term current use of insulin ([facility]) 10/18/2019   • Type 2 diabetes mellitus with renal complication ([facility])     insulin dependent   • Urinary frequency    • Use of cane as ambulatory aid    • Wears dentures    • Wears glasses    • Wears glasses    • Wheelchair dependent      Past Surgical History:   Procedure Laterality Date   • BACK SURGERY     • BRAIN SURGERY     • CARDIAC CATHETERIZATION      with stents   • CARDIAC CATHETERIZATION N/A 8/18/2023    Procedure: Cardiac Coronary Angiogram;  Surgeon: Horacio Weiner MD;  Location: [facility];  Service: Cardiology   • CEREBRAL ANEURYSM REPAIR      with coils   • COLONOSCOPY     • ESOPHAGOGASTRODUODENOSCOPY N/A 7/1/2016    Procedure: ESOPHAGOGASTRODUODENOSCOPY (EGD);  Surgeon: Reno Christiansen MD;  Location: [facility] GI [facility];  Service:    • GASTRIC BYPASS  11/19/2018   • HERNIA REPAIR     • HIATAL HERNIA REPAIR     • INFUSION PUMP IMPLANTATION Left     morphine   • INTRATHECAL PUMP IMPLANTATION Left 7/9/2020    Procedure: REVISION INTRATHECAL PAIN PUMP POCKET, LEFT ABDOMEN;  Surgeon: Homero Cho MD;  Location: [facility];  Service: Neurosurgery   • KNEE ARTHROSCOPY Right    • KNEE ARTHROSCOPY Right    • PERONEAL NERVE DECOMPRESSION Right     • MA DEBRIDEMENT OPEN WOUND FIRST 20 SQ CM/< N/A 6/6/2019    Procedure: EXCISIONAL DEBRIDEMENT OF SACRAL DECUBITUS ULCER;  Surgeon: Siddhartha Omer MD;  Location: [facility];  Service: General   • MA ESOPHAGOGASTRODUODENOSCOPY TRANSORAL DIAGNOSTIC N/A 2/27/2017    Procedure: ESOPHAGOGASTRODUODENOSCOPY (EGD);  Surgeon: Reno Christiansen MD;  Location: [facility];  Service: Gastroenterology   • MA ESOPHAGOGASTRODUODENOSCOPY TRANSORAL DIAGNOSTIC N/A 8/23/2018    Procedure: ESOPHAGOGASTRODUODENOSCOPY (EGD) with biopsy;  Surgeon: Reno Christiansen MD;  Location: [facility];  Service: Gastroenterology   • MA IMPLTJ/RPLCMT ITHCL/EDRL DRUG NFS PRGRBL PUMP Left 10/13/2020    Procedure: EXPLORATION OF INTRATHECAL PAIN PUMP SYSTEM INTEGRITY FOR POSSIBLE REPLACEMENT OF CATHETER AND PUMP.;  Surgeon: Homero Cho MD;  Location:  MAIN OR;  Service: Neurosurgery   • MA IMPLTJ/RPLCMT ITHCL/EDRL DRUG NFS SUBQ RSVR N/A 1/19/2017    Procedure: REMOVAL / EXCHANGE INTRATHECAL PAIN PUMP;  Surgeon: Que Leonard MD;  [facility];  Service: Orthopedics   • MA IMPLTJ/RPLCMT ITHCL/EDRL DRUG NFS SUBQ RSVR N/A 5/16/2016    Procedure: REPLACEMENT AND PROGRAM PUMP ;  Surgeon: Que Leonard MD;  [facility];  Service: Orthopedics   • MA PRQ IMPLTJ NSTIM ELECTRODE ARRAY EPIDURAL Right 3/17/2021    Procedure: INSERTION THORACIC DORSAL COLUMN SPINAL CORD STIMULATOR PERCUTANEOUS W IMPLANTABLE PULSE GENERATOR, RIGHT;  Surgeon: Homero Cho MD;  Location: [facility];  Service: Neurosurgery     Social History     Substance and Sexual Activity   Alcohol Use Not Currently     Social History     Substance and Sexual Activity   Drug Use Not Currently   • Types: Marijuana     Social History     Tobacco Use   Smoking Status Never   Smokeless Tobacco Never     Family History   Problem Relation Age of Onset   • Diabetes unspecified Mother    • Heart attack Father    • Diabetes unspecified Brother    • Diabetes unspecified Brother    • Diabetes  unspecified Maternal Grandmother    • Diabetes unspecified Paternal Grandmother    • Diabetes unspecified Paternal Uncle    • Colon cancer Neg Hx    • Colon polyps Neg Hx          Current Outpatient Medications:   •  albuterol (ACCUNEB) 1.25 MG/3ML nebulizer solution  •  amLODIPine (NORVASC) 5 mg tablet  •  ammonium lactate (LAC-HYDRIN) 12 % cream  •  aspirin 81 mg chewable tablet  •  atorvastatin (LIPITOR) 80 mg tablet  •  baclofen 10 mg tablet  •  busPIRone (BUSPAR) 5 mg tablet  •  CALCIUM PO  •  carvedilol (COREG) 12.5 mg tablet  •  clopidogrel (PLAVIX) 75 mg tablet  •  Cyanocobalamin (VITAMIN B-12 PO)  •  docusate sodium (COLACE) 100 mg capsule  •  Fluticasone-Salmeterol (Advair Diskus) 500-50 mcg/dose inhaler  •  folic acid (FOLVITE) 1 mg tablet  •  furosemide (LASIX) 40 mg tablet  •  gabapentin (NEURONTIN) 300 mg capsule  •  gabapentin (NEURONTIN) 600 MG tablet  •  hydrocortisone 1 % lotion  •  Klor-Con M20 20 MEQ tablet  •  lidocaine (LIDODERM) 5 %  •  losartan (COZAAR) 50 mg tablet  •  methocarbamol (ROBAXIN) 750 mg tablet  •  naloxegol oxalate (MOVANTIK) 25 MG tablet  •  nitroglycerin (NITROSTAT) 0.4 mg SL tablet  •  nystatin (MYCOSTATIN) cream  •  omeprazole (PriLOSEC) 40 MG capsule  •  ondansetron (ZOFRAN) 4 mg tablet  •  polyethylene glycol (GLYCOLAX) 17 GM/SCOOP powder  •  ranolazine (RANEXA) 1000 MG SR tablet  •  tamsulosin (FLOMAX) 0.4 mg  •  traZODone (DESYREL) 50 mg tablet  No Known Allergies  Reviewed medications and allergies and updated as indicated    PHYSICAL EXAM:    Blood pressure (!) 200/89, height 6' (1.829 m), weight (!) 139 kg (306 lb 3.2 oz). Body mass index is 41.53 kg/m². Repeat 168/86    Constitutional: Well-developed, no acute distress  HEENT: normocephalic, mucous membranes moist.  Neck: Supple  Skin: warm and dry  Respiratory: coarse breath sounds B/L.  Cardiovascular: Heart is regular rate and rhythm.  Gastrointestinal: obese, Soft, mild upper abd tenderness, nondistended with  normal active bowel sounds.  No masses, guarding, rebound.   Rectal Exam: Deferred.  Integumentary: Warm and dry  Extremities:2+  Edema LE, ambulates with rolling walker  Neurologic: Nonfocal. A & O ×3.   Psychiatric: Normal affect.    Lab Results:   As above    Radiology Results:   As above        Patient expressed understanding and had all questions and concerns addressed.    Advised patient to call with any questions, failure to improve, or if symptoms worsen.    Amanda Dempsey PA-C  01/31/24  1:24 PM    This chart was completed in part utilizing [employer] speech voice recognition software. Random word insertions, pronoun errors, and incomplete sentences are an occasional consequence of this system due to software limitations, and ambient noise. Any questions or concerns about the content, text, or information contained within the body of this dictation should be directly addressed to the provider for clarification.

## 2024-01-31 NOTE — TELEPHONE ENCOUNTER
Pt returning Dee's message to schedule manometry. Pt stated he will have his phone with him so you may call him back.

## 2024-02-06 ENCOUNTER — TELEPHONE (OUTPATIENT)
Dept: GASTROENTEROLOGY | Facility: AMBULATORY SURGERY CENTER | Age: 64
End: 2024-02-06

## 2024-02-22 DIAGNOSIS — I25.10 CORONARY ARTERY DISEASE INVOLVING NATIVE CORONARY ARTERY OF NATIVE HEART WITHOUT ANGINA PECTORIS: ICD-10-CM

## 2024-02-22 DIAGNOSIS — I50.32 CHRONIC DIASTOLIC CONGESTIVE HEART FAILURE (HCC): ICD-10-CM

## 2024-02-23 RX ORDER — POTASSIUM CHLORIDE 1500 MG/1
20 TABLET, EXTENDED RELEASE ORAL DAILY
Qty: 90 TABLET | Refills: 0 | Status: SHIPPED | OUTPATIENT
Start: 2024-02-23

## 2024-02-26 DIAGNOSIS — I25.10 CORONARY ARTERY DISEASE INVOLVING NATIVE CORONARY ARTERY OF NATIVE HEART WITHOUT ANGINA PECTORIS: ICD-10-CM

## 2024-02-26 RX ORDER — CLOPIDOGREL BISULFATE 75 MG/1
75 TABLET ORAL DAILY
Qty: 90 TABLET | Refills: 0 | Status: SHIPPED | OUTPATIENT
Start: 2024-02-26

## 2024-02-27 ENCOUNTER — HOSPITAL ENCOUNTER (OUTPATIENT)
Dept: GASTROENTEROLOGY | Facility: AMBULATORY SURGERY CENTER | Age: 64
Discharge: HOME | End: 2024-02-27
Payer: MEDICARE

## 2024-02-27 VITALS
SYSTOLIC BLOOD PRESSURE: 138 MMHG | RESPIRATION RATE: 22 BRPM | DIASTOLIC BLOOD PRESSURE: 75 MMHG | HEART RATE: 72 BPM | OXYGEN SATURATION: 96 % | TEMPERATURE: 97.8 F

## 2024-02-27 DIAGNOSIS — R13.19 ESOPHAGEAL DYSPHAGIA: ICD-10-CM

## 2024-02-27 NOTE — PERIOPERATIVE NURSING NOTE
Patient brought in the room and explained the esophageal manometry procedure. After the confirmation of allergies, lidocaine 2 % viscous given via nostrils and  a transnasal insertion of the High Resolution esophageal manometry catheter was inserted via left nostril. Patient given water to drink during the insertion and once the catheter inserted pressure bands of both Upper esophageal sphincter  (UES) and Lower esophageal sphincter ( LES) were observed on the color contour. Patient instructed to take a deep breath to verify placement of the catheter, diaphragmatic pinch noted on inspiration. Catheter was secured to left cheek. Patient was assisted to supine position .Patient was instructed to relax  while acclimating the catheter for about 5 minutes. A 30 second baseline resting pressure was obtained to identify the UES and LES followed by a series of 10 liquid swallows using 5 cc room temperature normal saline to assess esophageal motility and bolus transit. Patient administered 10 viscous swallows using 5 cc viscous solution, 1 multiple rapid drink swallow using 2 cc room temperature normal saline given a total of 5 drinks. Patient instructed to sit up at the edge of the stretcher and given 5 upright liquid swallows using 5 cc room temperature normal saline and 1 rapid drink challenge using 200 cc room temperature water.  UES swallows completed with 5 cc temperature normal saline to assess UES.  At the end of the procedure the high resolution esophageal manometry catheter was removed from the nostril intact. Patient given discharge instructions and patient left in stable condition.

## 2024-02-28 ENCOUNTER — TELEPHONE (OUTPATIENT)
Dept: CARDIOLOGY CLINIC | Facility: CLINIC | Age: 64
End: 2024-02-28

## 2024-02-28 NOTE — TELEPHONE ENCOUNTER
Call from [facility] asking if we can addend the last clearance letter from 1/5/24 for spinal cord stimulator battery change. Date got changed to March 8th. Will make new letter if you are OK with that. Please advise. Thank you.    LOV 10/31/23        Fax # [phone]

## 2024-03-01 ENCOUNTER — APPOINTMENT (OUTPATIENT)
Dept: RADIOLOGY | Facility: CLINIC | Age: 64
End: 2024-03-01
Payer: MEDICARE

## 2024-03-01 DIAGNOSIS — M17.0 BILATERAL PRIMARY OSTEOARTHRITIS OF KNEE: ICD-10-CM

## 2024-03-01 PROCEDURE — 73562 X-RAY EXAM OF KNEE 3: CPT

## 2024-03-01 PROCEDURE — 91010 ESOPHAGUS MOTILITY STUDY: CPT | Performed by: INTERNAL MEDICINE

## 2024-03-03 DIAGNOSIS — I21.3 STEMI (ST ELEVATION MYOCARDIAL INFARCTION) (HCC): ICD-10-CM

## 2024-03-04 ENCOUNTER — TELEPHONE (OUTPATIENT)
Dept: CARDIOLOGY CLINIC | Facility: CLINIC | Age: 64
End: 2024-03-04

## 2024-03-04 RX ORDER — ATORVASTATIN CALCIUM 80 MG/1
80 TABLET, FILM COATED ORAL
Qty: 90 TABLET | Refills: 0 | Status: SHIPPED | OUTPATIENT
Start: 2024-03-04

## 2024-03-04 RX ORDER — AMLODIPINE BESYLATE 5 MG/1
5 TABLET ORAL DAILY
Qty: 90 TABLET | Refills: 0 | Status: SHIPPED | OUTPATIENT
Start: 2024-03-04

## 2024-03-05 DIAGNOSIS — G62.9 NEUROPATHY: Primary | ICD-10-CM

## 2024-03-05 DIAGNOSIS — G43.709 CHRONIC MIGRAINE WITHOUT AURA WITHOUT STATUS MIGRAINOSUS, NOT INTRACTABLE: ICD-10-CM

## 2024-03-05 DIAGNOSIS — J44.9 CHRONIC OBSTRUCTIVE PULMONARY DISEASE, UNSPECIFIED COPD TYPE (HCC): ICD-10-CM

## 2024-03-05 DIAGNOSIS — R21 RASH: Primary | ICD-10-CM

## 2024-03-05 DIAGNOSIS — I25.10 CORONARY ARTERY DISEASE INVOLVING NATIVE CORONARY ARTERY OF NATIVE HEART WITHOUT ANGINA PECTORIS: Primary | ICD-10-CM

## 2024-03-05 RX ORDER — ALBUTEROL SULFATE 1.25 MG/3ML
1.25 SOLUTION RESPIRATORY (INHALATION) EVERY 4 HOURS PRN
Qty: 375 ML | Refills: 0 | Status: SHIPPED | OUTPATIENT
Start: 2024-03-05

## 2024-03-05 RX ORDER — NYSTATIN 100000 U/G
1 CREAM TOPICAL 2 TIMES DAILY
Qty: 15 G | Refills: 0 | Status: SHIPPED | OUTPATIENT
Start: 2024-03-05

## 2024-03-05 RX ORDER — HYDROCORTISONE 10 MG/ML
LOTION TOPICAL AS NEEDED
Qty: 59 ML | Refills: 1 | Status: SHIPPED | OUTPATIENT
Start: 2024-03-05

## 2024-03-05 RX ORDER — FOLIC ACID 1 MG/1
1 TABLET ORAL DAILY
Qty: 90 TABLET | Refills: 1 | Status: SHIPPED | OUTPATIENT
Start: 2024-03-05

## 2024-03-05 RX ORDER — FLUTICASONE PROPIONATE AND SALMETEROL 500; 50 UG/1; UG/1
1 POWDER RESPIRATORY (INHALATION) 2 TIMES DAILY
Qty: 60 BLISTER | Refills: 3 | Status: SHIPPED | OUTPATIENT
Start: 2024-03-05

## 2024-03-05 RX ORDER — LANOLIN ALCOHOL/MO/W.PET/CERES
1000 CREAM (GRAM) TOPICAL DAILY
Qty: 90 TABLET | Refills: 3 | Status: SHIPPED | OUTPATIENT
Start: 2024-03-05

## 2024-03-05 RX ORDER — NITROGLYCERIN 0.4 MG/1
0.4 TABLET SUBLINGUAL
Qty: 30 TABLET | Refills: 0 | Status: SHIPPED | OUTPATIENT
Start: 2024-03-05

## 2024-03-05 NOTE — TELEPHONE ENCOUNTER
Requested medication(s) are due for refill today: No  Patient has already received a courtesy refill: No  Other reason request has been forwarded to provider: not sure if this is a medication you've prescribed for pt.

## 2024-03-05 NOTE — TELEPHONE ENCOUNTER
Requested medication(s) are due for refill today: Yes  Patient has already received a courtesy refill: No  Other reason request has been forwarded to provider:    Normal rate, regular rhythm.  Heart sounds S1, S2.

## 2024-03-05 NOTE — TELEPHONE ENCOUNTER
Requested medication(s) are due for refill today: No  Patient has already received a courtesy refill: No  Other reason request has been forwarded to provider:

## 2024-03-05 NOTE — TELEPHONE ENCOUNTER
Requested medication(s) are due for refill today: No  Patient has already received a courtesy refill: No  Other reason request has been forwarded to provider: not sure if this is a medication you've ever prescribed for pt.

## 2024-03-06 DIAGNOSIS — G43.709 CHRONIC MIGRAINE WITHOUT AURA WITHOUT STATUS MIGRAINOSUS, NOT INTRACTABLE: ICD-10-CM

## 2024-03-06 RX ORDER — GABAPENTIN 300 MG/1
CAPSULE ORAL
Qty: 90 CAPSULE | Refills: 0 | Status: SHIPPED | OUTPATIENT
Start: 2024-03-06

## 2024-03-06 RX ORDER — GABAPENTIN 300 MG/1
300 CAPSULE ORAL AS NEEDED
Qty: 90 CAPSULE | Refills: 0 | Status: SHIPPED | OUTPATIENT
Start: 2024-03-06 | End: 2024-03-06

## 2024-03-07 ENCOUNTER — APPOINTMENT (OUTPATIENT)
Dept: LAB | Facility: HOSPITAL | Age: 64
End: 2024-03-07
Payer: MEDICARE

## 2024-03-07 DIAGNOSIS — Z79.899 MEDICAL MARIJUANA USE: ICD-10-CM

## 2024-03-07 LAB
BASOPHILS # BLD AUTO: 0.06 THOUSANDS/ÂΜL (ref 0–0.1)
BASOPHILS NFR BLD AUTO: 1 % (ref 0–1)
EOSINOPHIL # BLD AUTO: 0.34 THOUSAND/ÂΜL (ref 0–0.61)
EOSINOPHIL NFR BLD AUTO: 3 % (ref 0–6)
ERYTHROCYTE [DISTWIDTH] IN BLOOD BY AUTOMATED COUNT: 13.3 % (ref 11.6–15.1)
HCT VFR BLD AUTO: 46.4 % (ref 36.5–49.3)
HGB BLD-MCNC: 14.5 G/DL (ref 12–17)
IMM GRANULOCYTES # BLD AUTO: 0.12 THOUSAND/UL (ref 0–0.2)
IMM GRANULOCYTES NFR BLD AUTO: 1 % (ref 0–2)
INR PPP: 1.04 (ref 0.84–1.19)
LYMPHOCYTES # BLD AUTO: 2.52 THOUSANDS/ÂΜL (ref 0.6–4.47)
LYMPHOCYTES NFR BLD AUTO: 21 % (ref 14–44)
MCH RBC QN AUTO: 29.9 PG (ref 26.8–34.3)
MCHC RBC AUTO-ENTMCNC: 31.3 G/DL (ref 31.4–37.4)
MCV RBC AUTO: 96 FL (ref 82–98)
MONOCYTES # BLD AUTO: 0.86 THOUSAND/ÂΜL (ref 0.17–1.22)
MONOCYTES NFR BLD AUTO: 7 % (ref 4–12)
NEUTROPHILS # BLD AUTO: 8.31 THOUSANDS/ÂΜL (ref 1.85–7.62)
NEUTS SEG NFR BLD AUTO: 67 % (ref 43–75)
NRBC BLD AUTO-RTO: 0 /100 WBCS
PLATELET # BLD AUTO: 248 THOUSANDS/UL (ref 149–390)
PMV BLD AUTO: 10.5 FL (ref 8.9–12.7)
PROTHROMBIN TIME: 14 SECONDS (ref 11.6–14.5)
RBC # BLD AUTO: 4.85 MILLION/UL (ref 3.88–5.62)
WBC # BLD AUTO: 12.21 THOUSAND/UL (ref 4.31–10.16)

## 2024-03-07 PROCEDURE — 36415 COLL VENOUS BLD VENIPUNCTURE: CPT

## 2024-03-07 PROCEDURE — 85025 COMPLETE CBC W/AUTO DIFF WBC: CPT

## 2024-03-07 PROCEDURE — 85610 PROTHROMBIN TIME: CPT

## 2024-03-08 DIAGNOSIS — F51.04 PSYCHOPHYSIOLOGICAL INSOMNIA: ICD-10-CM

## 2024-03-09 RX ORDER — TRAZODONE HYDROCHLORIDE 50 MG/1
50 TABLET ORAL 2 TIMES DAILY
Qty: 90 TABLET | Refills: 1 | Status: SHIPPED | OUTPATIENT
Start: 2024-03-09

## 2024-03-20 ENCOUNTER — CONSULT (OUTPATIENT)
Age: 64
End: 2024-03-20
Payer: COMMERCIAL

## 2024-03-20 ENCOUNTER — APPOINTMENT (OUTPATIENT)
Dept: LAB | Facility: HOSPITAL | Age: 64
End: 2024-03-20
Payer: COMMERCIAL

## 2024-03-20 VITALS
HEIGHT: 72 IN | SYSTOLIC BLOOD PRESSURE: 124 MMHG | OXYGEN SATURATION: 97 % | BODY MASS INDEX: 41.31 KG/M2 | WEIGHT: 305 LBS | HEART RATE: 78 BPM | DIASTOLIC BLOOD PRESSURE: 80 MMHG

## 2024-03-20 DIAGNOSIS — Z79.899 MEDICAL MARIJUANA USE: ICD-10-CM

## 2024-03-20 DIAGNOSIS — G47.33 OSA (OBSTRUCTIVE SLEEP APNEA): Primary | ICD-10-CM

## 2024-03-20 DIAGNOSIS — G47.39 OTHER SLEEP APNEA: ICD-10-CM

## 2024-03-20 DIAGNOSIS — J44.9 CHRONIC OBSTRUCTIVE PULMONARY DISEASE, UNSPECIFIED COPD TYPE (HCC): ICD-10-CM

## 2024-03-20 DIAGNOSIS — J45.51 SEVERE PERSISTENT ASTHMA WITH (ACUTE) EXACERBATION: ICD-10-CM

## 2024-03-20 DIAGNOSIS — I25.10 CORONARY ARTERY DISEASE INVOLVING NATIVE CORONARY ARTERY, UNSPECIFIED WHETHER ANGINA PRESENT, UNSPECIFIED WHETHER NATIVE OR TRANSPLANTED HEART: ICD-10-CM

## 2024-03-20 PROCEDURE — 99205 OFFICE O/P NEW HI 60 MIN: CPT | Performed by: INTERNAL MEDICINE

## 2024-03-20 PROCEDURE — 86003 ALLG SPEC IGE CRUDE XTRC EA: CPT

## 2024-03-20 PROCEDURE — 82785 ASSAY OF IGE: CPT

## 2024-03-20 PROCEDURE — 94010 BREATHING CAPACITY TEST: CPT | Performed by: INTERNAL MEDICINE

## 2024-03-20 PROCEDURE — 36415 COLL VENOUS BLD VENIPUNCTURE: CPT

## 2024-03-20 RX ORDER — ALBUTEROL SULFATE 2.5 MG/3ML
2.5 SOLUTION RESPIRATORY (INHALATION) EVERY 6 HOURS PRN
Qty: 720 ML | Refills: 0 | Status: SHIPPED | OUTPATIENT
Start: 2024-03-20 | End: 2024-09-16

## 2024-03-20 RX ORDER — ALBUTEROL SULFATE 1.25 MG/3ML
1.25 SOLUTION RESPIRATORY (INHALATION) EVERY 4 HOURS PRN
Qty: 375 ML | Refills: 3 | Status: SHIPPED | OUTPATIENT
Start: 2024-03-20 | End: 2024-03-21

## 2024-03-20 RX ORDER — PREDNISONE 10 MG/1
TABLET ORAL
Qty: 28 TABLET | Refills: 0 | Status: SHIPPED | OUTPATIENT
Start: 2024-03-20

## 2024-03-20 NOTE — PATIENT INSTRUCTIONS
Take Advair 1 puff twice a day   Albuterol as needed with nebulizer every 4 hours as needed  Get blood work  Prednisone 2 pills a day for 1 week then 1 a day for 2 weeks

## 2024-03-20 NOTE — ASSESSMENT & PLAN NOTE
I reviewed Aristeo's office spirometry and although it was difficult to test for him to perform it appears his lung function is quite reduced.  I like to get full pulmonary function tests to better delineate his lung function but at this point I believe he has advanced COPD secondary to lifelong asthma.  In addition to Advair twice a day have given him a nebulizer with albuterol to use twice daily.  I also given him prednisone 20 mg a day for 1 week and then 10 mg a day for 2 weeks which will help with his shortness of breath asthma and pain.  He will come back in 6 weeks after finishing testing including allergy testing and plan of care.

## 2024-03-20 NOTE — PROGRESS NOTES
Assessment/Plan:    Chronic obstructive pulmonary disease, unspecified COPD type (HCC)  I reviewed Aristeo's office spirometry and although it was difficult to test for him to perform it appears his lung function is quite reduced.  I like to get full pulmonary function tests to better delineate his lung function but at this point I believe he has advanced COPD secondary to lifelong asthma.  In addition to Advair twice a day have given him a nebulizer with albuterol to use twice daily.  I also given him prednisone 20 mg a day for 1 week and then 10 mg a day for 2 weeks which will help with his shortness of breath asthma and pain.  He will come back in 6 weeks after finishing testing including allergy testing and plan of care.    Sleep apnea  Aristeo has very bad sleep apnea and his CPAP is broken beyond repair.  Is very urgent that he have a new CPAP machine which I ordered for him today.     Diagnoses and all orders for this visit:    ARLEEN (obstructive sleep apnea)  -     CPAP Auto New DME    Chronic obstructive pulmonary disease, unspecified COPD type (HCC)  -     Ambulatory Referral to Pulmonology  -     POCT spirometry  -     albuterol (ACCUNEB) 1.25 MG/3ML nebulizer solution; Take 3 mL (1.25 mg total) by nebulization every 4 (four) hours as needed for wheezing  -     Complete PFT without post bronchodilator; Future  -     [facility] Allergy Panel, Adult; Future  -     predniSONE 10 mg tablet; Take 2 a day for 1 week then 1 a day for 14 days  -     Nebulizer  -     Nebulizer Supplies  -     albuterol (2.5 mg/3 mL) 0.083 % nebulizer solution; Take 3 mL (2.5 mg total) by nebulization every 6 (six) hours as needed for wheezing or shortness of breath    Severe persistent asthma with (acute) exacerbation  -     [facility] Allergy Panel, Adult; Future    Coronary artery disease involving native coronary artery, unspecified whether angina present, unspecified whether native or transplanted heart    Other sleep apnea           Subjective:      Patient ID: Aristeo Hall is a 63 y.o. male.    Of breath.  He is a lifelong non-smoker but has had not had asthma for many years.  He gets short of breath with minimal exertion hears audible wheezing. He uses Advair twice a day but is quite limited ambulatory dysfunction due to neuropathy as well as his breathing.  He was wearing CPAP all night every night until his machine broke beyond repair and now he is very sleepy and is unable to breathe at night.        The following portions of the patient's history were reviewed and updated as appropriate: allergies, current medications, past family history, past medical history, past social history, past surgical history, and problem list.    Review of Systems   Constitutional: Negative.    HENT: Negative.     Eyes: Negative.    Respiratory:  Positive for shortness of breath.    Cardiovascular: Negative.    Gastrointestinal: Negative.    Endocrine: Negative.    Genitourinary: Negative.    Allergic/Immunologic: Negative.    Neurological: Negative.    Hematological: Negative.    Psychiatric/Behavioral: Negative.           Objective:      /80 (BP Location: Right arm, Patient Position: Sitting, Cuff Size: Large)   Pulse 78   Ht 6' (1.829 m)   Wt (!) 138 kg (305 lb)   SpO2 97%   BMI 41.37 kg/m²          Physical Exam  Constitutional:       Appearance: He is well-developed.   HENT:      Head: Normocephalic.   Eyes:      Pupils: Pupils are equal, round, and reactive to light.   Cardiovascular:      Rate and Rhythm: Normal rate.   Pulmonary:      Effort: Pulmonary effort is normal. No respiratory distress.      Breath sounds: No wheezing or rales.   Abdominal:      Palpations: Abdomen is soft.   Musculoskeletal:         General: Normal range of motion.      Cervical back: Neck supple.   Skin:     General: Skin is warm and dry.   Neurological:      Mental Status: He is alert and oriented to person, place, and time.

## 2024-03-20 NOTE — ASSESSMENT & PLAN NOTE
Aristeo has very bad sleep apnea and his CPAP is broken beyond repair.  Is very urgent that he have a new CPAP machine which I ordered for him today.

## 2024-03-21 ENCOUNTER — TELEPHONE (OUTPATIENT)
Age: 64
End: 2024-03-21

## 2024-03-21 ENCOUNTER — TELEPHONE (OUTPATIENT)
Dept: PULMONOLOGY | Facility: CLINIC | Age: 64
End: 2024-03-21

## 2024-03-21 DIAGNOSIS — I50.32 CHRONIC DIASTOLIC CONGESTIVE HEART FAILURE (HCC): ICD-10-CM

## 2024-03-21 DIAGNOSIS — R39.16 BENIGN PROSTATIC HYPERPLASIA (BPH) WITH STRAINING ON URINATION: ICD-10-CM

## 2024-03-21 DIAGNOSIS — I25.10 CORONARY ARTERY DISEASE INVOLVING NATIVE CORONARY ARTERY OF NATIVE HEART WITHOUT ANGINA PECTORIS: ICD-10-CM

## 2024-03-21 DIAGNOSIS — N40.1 BENIGN PROSTATIC HYPERPLASIA (BPH) WITH STRAINING ON URINATION: ICD-10-CM

## 2024-03-21 LAB
DME PARACHUTE DELIVERY DATE REQUESTED: NORMAL
DME PARACHUTE DELIVERY NOTE: NORMAL
DME PARACHUTE ITEM DESCRIPTION: NORMAL
DME PARACHUTE ORDER STATUS: NORMAL
DME PARACHUTE SUPPLIER NAME: NORMAL
DME PARACHUTE SUPPLIER PHONE: NORMAL

## 2024-03-21 NOTE — TELEPHONE ENCOUNTER
[facility] calling. States 2 orders for albuterol neb solution sent in yesterday. Calling to confirm which they should fill. Please advise.   They are asking for callback as well.

## 2024-03-21 NOTE — TELEPHONE ENCOUNTER
Patient called, he received a text message from [employer] stating they were shipping out his Full Face Mask to Address: [address]  [place] [address].  He is confused and unsure of why this is occurring. He stated he asked for the Nasal Cannula mask, as he has a beard and the full face mask doesn't work for him. Also he needed the order sent to his shipping Address: [address], [place] [address]. He asked if he could please have further assistance with this and a return call. Please advise.

## 2024-03-21 NOTE — TELEPHONE ENCOUNTER
I am not sure how both got ordered I discontinued the 1.25 I would like him to fill the 2.5 mg vial please

## 2024-03-21 NOTE — TELEPHONE ENCOUNTER
Order for Cpap supplies were placed through [facility] for [facility].

## 2024-03-21 NOTE — TELEPHONE ENCOUNTER
Order for a nebulizer was placed through [employer] for [facility],   SN# [id number] for consignment was Given in Office SLQ.

## 2024-03-22 LAB
A ALTERNATA IGE QN: <0.1 KUA/I
A FUMIGATUS IGE QN: <0.1 KUA/I
BERMUDA GRASS IGE QN: <0.1 KUA/I
BOXELDER IGE QN: <0.1 KUA/I
C HERBARUM IGE QN: <0.1 KUA/I
CAT DANDER IGE QN: <0.1 KUA/I
CMN PIGWEED IGE QN: <0.1 KUA/I
COMMON RAGWEED IGE QN: <0.1 KUA/I
COTTONWOOD IGE QN: <0.1 KUA/I
D FARINAE IGE QN: 0.91 KUA/I
D PTERONYSS IGE QN: 0.74 KUA/I
DME PARACHUTE DELIVERY DATE ACTUAL: NORMAL
DME PARACHUTE DELIVERY DATE REQUESTED: NORMAL
DME PARACHUTE DELIVERY NOTE: NORMAL
DME PARACHUTE ITEM DESCRIPTION: NORMAL
DME PARACHUTE ORDER STATUS: NORMAL
DME PARACHUTE SUPPLIER NAME: NORMAL
DME PARACHUTE SUPPLIER PHONE: NORMAL
DOG DANDER IGE QN: <0.1 KUA/I
LONDON PLANE IGE QN: <0.1 KUA/I
MOUSE URINE PROT IGE QN: <0.1 KUA/I
MT JUNIPER IGE QN: <0.1 KUA/I
MUGWORT IGE QN: <0.1 KUA/I
P NOTATUM IGE QN: <0.1 KUA/I
ROACH IGE QN: 0.19 KUA/I
SHEEP SORREL IGE QN: <0.1 KUA/I
SILVER BIRCH IGE QN: <0.1 KUA/I
TIMOTHY IGE QN: <0.1 KUA/I
TOTAL IGE SMQN RAST: 112 KU/L (ref 0–113)
WALNUT IGE QN: <0.1 KUA/I
WHITE ASH IGE QN: <0.1 KUA/I
WHITE ELM IGE QN: <0.1 KUA/I
WHITE MULBERRY IGE QN: <0.1 KUA/I
WHITE OAK IGE QN: <0.1 KUA/I

## 2024-03-22 RX ORDER — FUROSEMIDE 40 MG/1
40 TABLET ORAL DAILY
Qty: 90 TABLET | Refills: 0 | Status: SHIPPED | OUTPATIENT
Start: 2024-03-22 | End: 2024-03-28 | Stop reason: SDUPTHER

## 2024-03-23 RX ORDER — TAMSULOSIN HYDROCHLORIDE 0.4 MG/1
0.4 CAPSULE ORAL
Qty: 90 CAPSULE | Refills: 1 | Status: SHIPPED | OUTPATIENT
Start: 2024-03-23

## 2024-03-25 NOTE — TELEPHONE ENCOUNTER
Called [facility] and spoke with Stacy, she checked the order and said it was ok I reiterated  that the Pt has a full face beard and that he doesn't want a full face mask and She asked if he slept with his mouth opened, she said that it was ok and that she would handle it

## 2024-03-26 ENCOUNTER — OFFICE VISIT (OUTPATIENT)
Dept: GASTROENTEROLOGY | Facility: CLINIC | Age: 64
End: 2024-03-26
Payer: COMMERCIAL

## 2024-03-26 VITALS
HEIGHT: 72 IN | SYSTOLIC BLOOD PRESSURE: 122 MMHG | BODY MASS INDEX: 41.17 KG/M2 | DIASTOLIC BLOOD PRESSURE: 80 MMHG | WEIGHT: 304 LBS

## 2024-03-26 DIAGNOSIS — K22.4 SPASM OF ESOPHAGUS: Primary | ICD-10-CM

## 2024-03-26 DIAGNOSIS — Z90.3 H/O GASTRIC SLEEVE: ICD-10-CM

## 2024-03-26 DIAGNOSIS — F11.20 OPIOID DEPENDENCE, CONTINUOUS (HCC): ICD-10-CM

## 2024-03-26 PROCEDURE — 99214 OFFICE O/P EST MOD 30 MIN: CPT | Performed by: INTERNAL MEDICINE

## 2024-03-26 PROCEDURE — G2211 COMPLEX E/M VISIT ADD ON: HCPCS | Performed by: INTERNAL MEDICINE

## 2024-03-26 NOTE — PROGRESS NOTES
Formerly [facility], [facility] - Outpatient Follow-up Note  Aristeo Hall 63 y.o. male MRN: [id number]  Encounter: [id number]    ASSESSMENT AND PLAN:      1. Spasm of esophagus  That this is likely due to his high pressure anatomy from his gastric sleeve as his symptoms correlate to the timing of his sleeve gastrectomy.  He is interested in converting to bypass to help with his reflux and to lose more weight.  I will refer him to bariatrics.  Because of his history with heart failure and complicated cardiac issues, I am quite reluctant to start anything like a calcium channel blocker  - Ambulatory Referral to Weight Management; Future    2. H/O gastric sleeve  I explained to him this anatomy contributes to reflux and can lead to esophageal dysmotility.  He was never told this before and is hopeful that he can be converted to bypass  - Ambulatory Referral to Weight Management; Future    3. Opioid dependence, continuous (HCC)  There could be a chance that his opioid use is contributing to his dysmotility, however typically we see an achalasia type dysmotility on manometry rather than spasm like he has.  I am hopeful that converting to sleeve will help his dysmotility      Follow up appointment: 3 months    _______________________      Chief Complaint   Patient presents with    Follow-up     Nothing has changed, pt can't eat and has hard time swallowing, Taking him 2 hours to eat       HPI:   Patient is a 63 y.o. male with a significant PMH of opioid dependence, history of gastric sleeve and resultant reflux and esophageal dysphagia presenting for follow up regarding continued issues with esophageal dysphagia.  He states he is quite miserable as it takes him 2-1/2 hours to eat a meal.  He has issues with solid food as it gets stuck in his chest and he will have to either wait for it to go down or drink lots of liquids to help it go down.  He has no issues with liquid dysphagia.  He has no  heartburn but does admit to regurgitation even while on omeprazole 40 mg daily.  Upper endoscopies have been unremarkable.  He had recent esophageal manometry that showed distal esophageal spasm.  The study was done while on opiates (he is on a Dilaudid pain pump).    Historical Information   Past Medical History:   Diagnosis Date    Acute on chronic diastolic congestive heart failure ([facility])     Altered gait     Alzheimer disease ([facility])     per patients,,early onset     Angina pectoris ([facility])     Anxiety     Arthritis     Brain aneurysm     coils placed    Cardiac disease     Chest pain 01/13/2016    Chronic kidney disease     Chronic pain     back/ right groin and rle- has morphine pump    Constipation     COPD (chronic obstructive pulmonary disease) ([facility])     Coronary artery disease     CPAP (continuous positive airway pressure) dependence     Decubital ulcer     sacral decub-occured 5/2019-sees wound care/debide in OR today 6/6/2019    Dependent on walker for ambulation     w/c for long distance    Depression     Diabetes mellitus ([facility])     insulin dependent    Dizziness     occ    Dysphagia     Enlarged prostate     Esophageal stricture In file    Esophageal varices ([facility])     Fall     Fall at home 05/03/2019    GERD (gastroesophageal reflux disease)     Heart failure ([facility])     Hiatal hernia     Hx of gastric bypass 11/19/2018    Hypercholesterolemia     Hypertension     MI (myocardial infarction) ([facility])     2017- stents x2    Migraine     Morbid obesity ([facility])     gastric bypass sleeve 11/2018-wt loss 125 lb    Neuropathy     Oxygen dependent     Q HS  2LPM with CPAP and prn during day 2-3 LPM     Pressure injury of skin     Pressure injury of skin of sacral region 06/03/2019    Added automatically from request for surgery [id number]    Renal disorder     Shortness of breath     Skin abnormality     sacral wound - covered with pad    Sleep apnea     Stented coronary artery     Stroke ([facility])     vision loss b/l  2005,  residual R leg weakness    Type 2 diabetes mellitus with diabetic neuropathy, with long-term current use of insulin (HCC) 10/18/2019    Type 2 diabetes mellitus with renal complication (HCC)     insulin dependent    Urinary frequency     Use of cane as ambulatory aid     Wears dentures     Wears glasses     Wears glasses     Wheelchair dependent      Past Surgical History:   Procedure Laterality Date    BACK SURGERY      BRAIN SURGERY      CARDIAC CATHETERIZATION      with stents    CARDIAC CATHETERIZATION N/A 8/18/2023    Procedure: Cardiac Coronary Angiogram;  Surgeon: Horacio Weiner MD;  Location: [facility];  Service: Cardiology    CEREBRAL ANEURYSM REPAIR      with coils    COLONOSCOPY      ESOPHAGOGASTRODUODENOSCOPY N/A 7/1/2016    Procedure: ESOPHAGOGASTRODUODENOSCOPY (EGD);  Surgeon: Reno Christiansen MD;  Location: [facility];  Service:     GASTRIC BYPASS  11/19/2018    HERNIA REPAIR      HIATAL HERNIA REPAIR      INFUSION PUMP IMPLANTATION Left     morphine    INTRATHECAL PUMP IMPLANTATION Left 7/9/2020    Procedure: REVISION INTRATHECAL PAIN PUMP POCKET, LEFT ABDOMEN;  Surgeon: Homero Cho MD;  Location: [facility];  Service: Neurosurgery    KNEE ARTHROSCOPY Right     KNEE ARTHROSCOPY Right     PERONEAL NERVE DECOMPRESSION Right     IN DEBRIDEMENT OPEN WOUND FIRST 20 SQ CM/< N/A 6/6/2019    Procedure: EXCISIONAL DEBRIDEMENT OF SACRAL DECUBITUS ULCER;  Surgeon: Siddhartha Omer MD;  Location: [facility];  Service: General    IN ESOPHAGOGASTRODUODENOSCOPY TRANSORAL DIAGNOSTIC N/A 2/27/2017    Procedure: ESOPHAGOGASTRODUODENOSCOPY (EGD);  Surgeon: Reno Christiansen MD;  Location: [facility];  Service: Gastroenterology    IN ESOPHAGOGASTRODUODENOSCOPY TRANSORAL DIAGNOSTIC N/A 8/23/2018    Procedure: ESOPHAGOGASTRODUODENOSCOPY (EGD) with biopsy;  Surgeon: Reno Christiansen MD;  [facility]: [facility];  Service: Gastroenterology    IN IMPLTJ/RPLCMT ITHCL/EDRL DRUG NFS PRGRBL PUMP Left 10/13/2020    Procedure: EXPLORATION  OF INTRATHECAL PAIN PUMP SYSTEM INTEGRITY FOR POSSIBLE REPLACEMENT OF CATHETER AND PUMP.;  Surgeon: Homero Cho MD;  Location: [facility];  Service: Neurosurgery    SD IMPLTJ/RPLCMT ITHCL/EDRL DRUG NFS SUBQ RSVR N/A 1/19/2017    Procedure: REMOVAL / EXCHANGE INTRATHECAL PAIN PUMP;  Surgeon: Que Leonard MD;  [facility];  Service: Orthopedics    SD IMPLTJ/RPLCMT ITHCL/EDRL DRUG NFS SUBQ RSVR N/A 5/16/2016    Procedure: REPLACEMENT AND PROGRAM PUMP ;  Surgeon: Que Leonard MD;  [facility];  Service: Orthopedics    SD PRQ IMPLTJ NSTIM ELECTRODE ARRAY EPIDURAL Right 3/17/2021    Procedure: INSERTION THORACIC DORSAL COLUMN SPINAL CORD STIMULATOR PERCUTANEOUS W IMPLANTABLE PULSE GENERATOR, RIGHT;  Surgeon: Homero Cho MD;  Location[facility];  Service: Neurosurgery     Social History     Substance and Sexual Activity   Alcohol Use Not Currently     Social History     Substance and Sexual Activity   Drug Use Not Currently    Types: Marijuana     Social History     Tobacco Use   Smoking Status Never   Smokeless Tobacco Never     Family History   Problem Relation Age of Onset    Diabetes unspecified Mother     Diabetes Mother     Heart attack Father     Heart disease Father     Hypertension Father     Stroke Father     Diabetes unspecified Brother     Depression Brother     Mental illness Brother     Diabetes unspecified Brother     Diabetes unspecified Maternal Grandmother     Diabetes unspecified Paternal Grandmother     Diabetes unspecified Paternal Uncle     ADD / ADHD Cousin     Colon cancer Neg Hx     Colon polyps Neg Hx          Current Outpatient Medications:     albuterol (2.5 mg/3 mL) 0.083 % nebulizer solution    amLODIPine (NORVASC) 5 mg tablet    ammonium lactate (LAC-HYDRIN) 12 % cream    aspirin 81 mg chewable tablet    atorvastatin (LIPITOR) 80 mg tablet    baclofen 10 mg tablet    busPIRone (BUSPAR) 5 mg tablet    CALCIUM PO    carvedilol (COREG) 12.5 mg tablet    clopidogrel  (PLAVIX) 75 mg tablet    docusate sodium (COLACE) 100 mg capsule    Fluticasone-Salmeterol (Advair Diskus) 500-50 mcg/dose inhaler    folic acid (FOLVITE) 1 mg tablet    furosemide (LASIX) 40 mg tablet    gabapentin (NEURONTIN) 300 mg capsule    gabapentin (NEURONTIN) 600 MG tablet    hydrocortisone 1 % lotion    Klor-Con M20 20 MEQ tablet    lidocaine (LIDODERM) 5 %    losartan (COZAAR) 50 mg tablet    methocarbamol (ROBAXIN) 750 mg tablet    naloxegol oxalate (MOVANTIK) 25 MG tablet    nitroglycerin (Nitrostat) 0.4 mg SL tablet    nystatin (MYCOSTATIN) cream    omeprazole (PriLOSEC) 40 MG capsule    ondansetron (ZOFRAN) 4 mg tablet    predniSONE 10 mg tablet    ranolazine (RANEXA) 1000 MG SR tablet    tamsulosin (FLOMAX) 0.4 mg    traZODone (DESYREL) 50 mg tablet    vitamin B-12 (VITAMIN B-12) 1,000 mcg tablet    Cyanocobalamin (VITAMIN B-12 PO)    polyethylene glycol (GLYCOLAX) 17 GM/SCOOP powder  No Known Allergies  Reviewed medications and allergies and updated as indicated    PHYSICAL EXAM:    Blood pressure 122/80, height 6' (1.829 m), weight (!) 138 kg (304 lb). Body mass index is 41.23 kg/m².  General Appearance: NAD, cooperative, alert  Eyes: Anicteric  GI:  Soft, non-tender, non-distended; normal bowel sounds; no masses, no organomegaly   Rectal: Deferred  Musculoskeletal: No edema.  Skin:  No jaundice    Lab Results:   Lab Results   Component Value Date    WBC 12.21 (H) 03/07/2024    WBC 13.54 (H) 01/05/2024    WBC 10.91 (H) 08/31/2023    HGB 14.5 03/07/2024    HGB 15.2 01/05/2024    HGB 12.9 08/31/2023    MCV 96 03/07/2024     03/07/2024     01/05/2024     08/31/2023     Lab Results   Component Value Date     (L) 10/21/2015    K 4.0 01/05/2024     01/05/2024    CO2 23 01/05/2024    ANIONGAP 9 10/21/2015    BUN 11 01/05/2024    CREATININE 1.17 01/05/2024    GLUCOSE 224 (H) 10/21/2015    GLUF 72 01/05/2024    CALCIUM 9.7 01/05/2024    CORRECTEDCA 8.5 08/28/2023    AST 13  08/28/2023    AST 38 08/23/2023    AST 57 (H) 08/19/2023    ALT 15 08/28/2023    ALT 27 08/23/2023    ALT 35 08/19/2023    ALKPHOS 48 08/28/2023    ALKPHOS 63 08/23/2023    ALKPHOS 79 08/19/2023    PROT 5.9 (L) 10/19/2015    BILITOT 0.81 10/19/2015    BILITOT 0.82 10/18/2015    BILITOT 0.3 01/05/2015    EGFR 66 01/05/2024     Lab Results   Component Value Date    IRON 41 04/09/2019     Lab Results   Component Value Date    LIPASE 72 (L) 07/09/2022       Radiology Results:   POCT spirometry    Result Date: 3/20/2024  Impression: Difficult to interpret but FEV1 was only 12% likely severe obstructive lung defect    XR knee 3 vw left non injury    Result Date: 3/1/2024  Narrative: LEFT KNEE INDICATION:   Bilateral primary osteoarthritis of knee. COMPARISON:  None. VIEWS:  XR KNEE 3 VW LEFT NON INJURY FINDINGS: There is no acute fracture or dislocation. There is no joint effusion. There is very mild narrowing of the lateral compartment Valgus angulation secondarily. No significant osteophytes. Mild narrowing of the patellofemoral space medially. No significant enthesopathy. No lytic or blastic osseous lesion. Soft tissues are unremarkable.     Impression: Mild degenerative narrowing of the lateral compartment when the lateral femoral space as above. Mild valgus angulation secondarily. No fracture or effusion. Electronically signed: 03/01/2024 04:13 PM Kelton Castro MD    XR knee 3 vw right non injury    Result Date: 3/1/2024  Narrative: RIGHT KNEE INDICATION:   Bilateral primary osteoarthritis of knee.   COMPARISON:  None. VIEWS:  XR KNEE 3 VW RIGHT NON INJURY FINDINGS: There is no acute fracture or dislocation. There is no joint effusion. Minimal medial compartment narrowing. Patellofemoral space and lateral compartment are preserved. Tiny marginal osteophytes of the medial tibial plateau and the lateral condyle. No lytic or blastic osseous lesion. Soft tissues are unremarkable.     Impression: Minimal medial  compartment degenerative changes. Preserved lateral compartment and patellofemoral space. No fracture or dislocation Electronically signed: 03/01/2024 04:13 PM Kelton Castro MD    Esophageal manometry    Result Date: 3/1/2024  Narrative: Esophageal Manometry Aristeo Hall 63 y.o. male MRN: [id number] [facility] [address] [place] [id number] [phone] Procedure Date: 2/27/2024 Referring Physician: Amanda Dempsey PA-C Attending: Lopez Montano DO Indications(s): Dysphagia Motility Nurse: Samantha Schoendorfer I have reviewed the indications for the procedure, the manometric recordings for the procedure, and the measured/calculated manometric parameters and other findings included in the accompanying motility note.  Impression: High LES resting pressure with incomplete relaxation (high IRP) Short DL's with simultaneous contractions with liquids (6/10) and viscous (10/10) Incomplete bolus transit with liquids Complete bolus transit with viscous Post-procedure Diagnoses:  Distal esophageal spasm (patient on opiates) Lopez Montano DO 3/1/2024 10:02 AM

## 2024-03-28 ENCOUNTER — HOSPITAL ENCOUNTER (INPATIENT)
Facility: HOSPITAL | Age: 64
LOS: 3 days | Discharge: HOME | DRG: 092 | End: 2024-03-31
Attending: EMERGENCY MEDICINE | Admitting: INTERNAL MEDICINE
Payer: COMMERCIAL

## 2024-03-28 ENCOUNTER — OFFICE VISIT (OUTPATIENT)
Dept: CARDIOLOGY CLINIC | Facility: CLINIC | Age: 64
End: 2024-03-28
Payer: COMMERCIAL

## 2024-03-28 ENCOUNTER — APPOINTMENT (EMERGENCY)
Dept: CT IMAGING | Facility: HOSPITAL | Age: 64
DRG: 092 | End: 2024-03-28
Payer: COMMERCIAL

## 2024-03-28 ENCOUNTER — APPOINTMENT (INPATIENT)
Dept: NON INVASIVE DIAGNOSTICS | Facility: HOSPITAL | Age: 64
DRG: 092 | End: 2024-03-28
Payer: COMMERCIAL

## 2024-03-28 VITALS
SYSTOLIC BLOOD PRESSURE: 142 MMHG | HEIGHT: 72 IN | BODY MASS INDEX: 41.45 KG/M2 | WEIGHT: 306 LBS | HEART RATE: 61 BPM | DIASTOLIC BLOOD PRESSURE: 88 MMHG

## 2024-03-28 DIAGNOSIS — I67.1 BRAIN ANEURYSM: ICD-10-CM

## 2024-03-28 DIAGNOSIS — F11.20 OPIOID DEPENDENCE, CONTINUOUS (HCC): ICD-10-CM

## 2024-03-28 DIAGNOSIS — I63.9 STROKE (CEREBRUM) (HCC): ICD-10-CM

## 2024-03-28 DIAGNOSIS — R29.90 STROKE-LIKE SYMPTOMS: ICD-10-CM

## 2024-03-28 DIAGNOSIS — N18.9 CHRONIC KIDNEY DISEASE: ICD-10-CM

## 2024-03-28 DIAGNOSIS — E66.9 OBESITY: ICD-10-CM

## 2024-03-28 DIAGNOSIS — I51.4 MYOCARDITIS, UNSPECIFIED CHRONICITY, UNSPECIFIED MYOCARDITIS TYPE (HCC): ICD-10-CM

## 2024-03-28 DIAGNOSIS — R44.9 SENSORY DEFICIT, RIGHT: Primary | ICD-10-CM

## 2024-03-28 DIAGNOSIS — E78.00 PURE HYPERCHOLESTEROLEMIA: ICD-10-CM

## 2024-03-28 DIAGNOSIS — R29.810 FACIAL DROOP: ICD-10-CM

## 2024-03-28 DIAGNOSIS — I25.118 CORONARY ARTERY DISEASE OF NATIVE ARTERY OF NATIVE HEART WITH STABLE ANGINA PECTORIS (HCC): ICD-10-CM

## 2024-03-28 DIAGNOSIS — F51.04 PSYCHOPHYSIOLOGICAL INSOMNIA: ICD-10-CM

## 2024-03-28 DIAGNOSIS — I50.32 CHRONIC DIASTOLIC CONGESTIVE HEART FAILURE (HCC): Primary | ICD-10-CM

## 2024-03-28 PROBLEM — R60.0 BILATERAL LEG EDEMA: Status: RESOLVED | Noted: 2017-09-05 | Resolved: 2024-03-28

## 2024-03-28 LAB
2HR DELTA HS TROPONIN: 0 NG/L
4HR DELTA HS TROPONIN: -1 NG/L
ANION GAP SERPL CALCULATED.3IONS-SCNC: 6 MMOL/L (ref 4–13)
APTT PPP: 26 SECONDS (ref 23–37)
BUN SERPL-MCNC: 15 MG/DL (ref 5–25)
CALCIUM SERPL-MCNC: 8.8 MG/DL (ref 8.4–10.2)
CARDIAC TROPONIN I PNL SERPL HS: 5 NG/L
CARDIAC TROPONIN I PNL SERPL HS: 6 NG/L
CARDIAC TROPONIN I PNL SERPL HS: 6 NG/L
CHLORIDE SERPL-SCNC: 105 MMOL/L (ref 96–108)
CO2 SERPL-SCNC: 29 MMOL/L (ref 21–32)
CREAT SERPL-MCNC: 1.06 MG/DL (ref 0.6–1.3)
ERYTHROCYTE [DISTWIDTH] IN BLOOD BY AUTOMATED COUNT: 12.9 % (ref 11.6–15.1)
EST. AVERAGE GLUCOSE BLD GHB EST-MCNC: 131 MG/DL
GFR SERPL CREATININE-BSD FRML MDRD: 75 ML/MIN/1.73SQ M
GLUCOSE SERPL-MCNC: 109 MG/DL (ref 65–140)
GLUCOSE SERPL-MCNC: 123 MG/DL (ref 65–140)
GLUCOSE SERPL-MCNC: 134 MG/DL (ref 65–140)
GLUCOSE SERPL-MCNC: 137 MG/DL (ref 65–140)
GLUCOSE SERPL-MCNC: 159 MG/DL (ref 65–140)
HBA1C MFR BLD: 6.2 %
HCT VFR BLD AUTO: 44.8 % (ref 36.5–49.3)
HGB BLD-MCNC: 14.2 G/DL (ref 12–17)
INR PPP: 1.11 (ref 0.84–1.19)
MCH RBC QN AUTO: 29.8 PG (ref 26.8–34.3)
MCHC RBC AUTO-ENTMCNC: 31.7 G/DL (ref 31.4–37.4)
MCV RBC AUTO: 94 FL (ref 82–98)
PLATELET # BLD AUTO: 254 THOUSANDS/UL (ref 149–390)
PMV BLD AUTO: 10.1 FL (ref 8.9–12.7)
POTASSIUM SERPL-SCNC: 3.9 MMOL/L (ref 3.5–5.3)
PROTHROMBIN TIME: 14.8 SECONDS (ref 11.6–14.5)
RBC # BLD AUTO: 4.77 MILLION/UL (ref 3.88–5.62)
SODIUM SERPL-SCNC: 140 MMOL/L (ref 135–147)
TSH SERPL DL<=0.05 MIU/L-ACNC: 3.13 UIU/ML (ref 0.45–4.5)
WBC # BLD AUTO: 12.59 THOUSAND/UL (ref 4.31–10.16)

## 2024-03-28 PROCEDURE — 85610 PROTHROMBIN TIME: CPT | Performed by: EMERGENCY MEDICINE

## 2024-03-28 PROCEDURE — 99285 EMERGENCY DEPT VISIT HI MDM: CPT

## 2024-03-28 PROCEDURE — 93000 ELECTROCARDIOGRAM COMPLETE: CPT | Performed by: INTERNAL MEDICINE

## 2024-03-28 PROCEDURE — 36415 COLL VENOUS BLD VENIPUNCTURE: CPT | Performed by: EMERGENCY MEDICINE

## 2024-03-28 PROCEDURE — 3044F HG A1C LEVEL LT 7.0%: CPT | Performed by: INTERNAL MEDICINE

## 2024-03-28 PROCEDURE — 70498 CT ANGIOGRAPHY NECK: CPT

## 2024-03-28 PROCEDURE — 99223 1ST HOSP IP/OBS HIGH 75: CPT | Performed by: STUDENT IN AN ORGANIZED HEALTH CARE EDUCATION/TRAINING PROGRAM

## 2024-03-28 PROCEDURE — 99285 EMERGENCY DEPT VISIT HI MDM: CPT | Performed by: EMERGENCY MEDICINE

## 2024-03-28 PROCEDURE — 84443 ASSAY THYROID STIM HORMONE: CPT | Performed by: STUDENT IN AN ORGANIZED HEALTH CARE EDUCATION/TRAINING PROGRAM

## 2024-03-28 PROCEDURE — 93005 ELECTROCARDIOGRAM TRACING: CPT

## 2024-03-28 PROCEDURE — 99215 OFFICE O/P EST HI 40 MIN: CPT | Performed by: INTERNAL MEDICINE

## 2024-03-28 PROCEDURE — 82948 REAGENT STRIP/BLOOD GLUCOSE: CPT

## 2024-03-28 PROCEDURE — 84484 ASSAY OF TROPONIN QUANT: CPT | Performed by: EMERGENCY MEDICINE

## 2024-03-28 PROCEDURE — 84484 ASSAY OF TROPONIN QUANT: CPT | Performed by: STUDENT IN AN ORGANIZED HEALTH CARE EDUCATION/TRAINING PROGRAM

## 2024-03-28 PROCEDURE — 99291 CRITICAL CARE FIRST HOUR: CPT | Performed by: PSYCHIATRY & NEUROLOGY

## 2024-03-28 PROCEDURE — 83036 HEMOGLOBIN GLYCOSYLATED A1C: CPT | Performed by: STUDENT IN AN ORGANIZED HEALTH CARE EDUCATION/TRAINING PROGRAM

## 2024-03-28 PROCEDURE — 85730 THROMBOPLASTIN TIME PARTIAL: CPT | Performed by: EMERGENCY MEDICINE

## 2024-03-28 PROCEDURE — 70496 CT ANGIOGRAPHY HEAD: CPT

## 2024-03-28 PROCEDURE — 85027 COMPLETE CBC AUTOMATED: CPT | Performed by: EMERGENCY MEDICINE

## 2024-03-28 PROCEDURE — 80048 BASIC METABOLIC PNL TOTAL CA: CPT | Performed by: EMERGENCY MEDICINE

## 2024-03-28 RX ORDER — METHOCARBAMOL 500 MG/1
750 TABLET, FILM COATED ORAL EVERY 6 HOURS PRN
Status: DISCONTINUED | OUTPATIENT
Start: 2024-03-28 | End: 2024-03-31 | Stop reason: HOSPADM

## 2024-03-28 RX ORDER — POLYETHYLENE GLYCOL 3350 17 G/17G
17 POWDER, FOR SOLUTION ORAL DAILY PRN
Status: DISCONTINUED | OUTPATIENT
Start: 2024-03-28 | End: 2024-03-30

## 2024-03-28 RX ORDER — TAMSULOSIN HYDROCHLORIDE 0.4 MG/1
0.4 CAPSULE ORAL
Status: DISCONTINUED | OUTPATIENT
Start: 2024-03-28 | End: 2024-03-31 | Stop reason: HOSPADM

## 2024-03-28 RX ORDER — ATORVASTATIN CALCIUM 40 MG/1
80 TABLET, FILM COATED ORAL
Status: DISCONTINUED | OUTPATIENT
Start: 2024-03-28 | End: 2024-03-31 | Stop reason: HOSPADM

## 2024-03-28 RX ORDER — TRAZODONE HYDROCHLORIDE 50 MG/1
50 TABLET ORAL 2 TIMES DAILY
Status: DISCONTINUED | OUTPATIENT
Start: 2024-03-28 | End: 2024-03-28

## 2024-03-28 RX ORDER — GABAPENTIN 400 MG/1
800 CAPSULE ORAL 4 TIMES DAILY
Status: DISCONTINUED | OUTPATIENT
Start: 2024-03-28 | End: 2024-03-31 | Stop reason: HOSPADM

## 2024-03-28 RX ORDER — MAGNESIUM HYDROXIDE/ALUMINUM HYDROXICE/SIMETHICONE 120; 1200; 1200 MG/30ML; MG/30ML; MG/30ML
30 SUSPENSION ORAL EVERY 6 HOURS PRN
Status: DISCONTINUED | OUTPATIENT
Start: 2024-03-28 | End: 2024-03-31 | Stop reason: HOSPADM

## 2024-03-28 RX ORDER — FLUTICASONE FUROATE AND VILANTEROL 200; 25 UG/1; UG/1
1 POWDER RESPIRATORY (INHALATION)
Status: DISCONTINUED | OUTPATIENT
Start: 2024-03-28 | End: 2024-03-31 | Stop reason: HOSPADM

## 2024-03-28 RX ORDER — ASPIRIN 81 MG/1
81 TABLET, CHEWABLE ORAL DAILY
Status: DISCONTINUED | OUTPATIENT
Start: 2024-03-29 | End: 2024-03-31 | Stop reason: HOSPADM

## 2024-03-28 RX ORDER — BACLOFEN 10 MG/1
10 TABLET ORAL 3 TIMES DAILY
Status: DISCONTINUED | OUTPATIENT
Start: 2024-03-28 | End: 2024-03-31 | Stop reason: HOSPADM

## 2024-03-28 RX ORDER — CLOPIDOGREL BISULFATE 75 MG/1
75 TABLET ORAL DAILY
Status: DISCONTINUED | OUTPATIENT
Start: 2024-03-29 | End: 2024-03-31 | Stop reason: HOSPADM

## 2024-03-28 RX ORDER — GABAPENTIN 600 MG/1
600 TABLET ORAL 4 TIMES DAILY
Status: DISCONTINUED | OUTPATIENT
Start: 2024-03-28 | End: 2024-03-28

## 2024-03-28 RX ORDER — ACETAMINOPHEN 325 MG/1
650 TABLET ORAL EVERY 6 HOURS PRN
Status: DISCONTINUED | OUTPATIENT
Start: 2024-03-28 | End: 2024-03-31 | Stop reason: HOSPADM

## 2024-03-28 RX ORDER — DOCUSATE SODIUM 100 MG/1
100 CAPSULE, LIQUID FILLED ORAL 2 TIMES DAILY
Status: DISCONTINUED | OUTPATIENT
Start: 2024-03-28 | End: 2024-03-30

## 2024-03-28 RX ORDER — ASPIRIN 81 MG/1
324 TABLET, CHEWABLE ORAL ONCE
Status: COMPLETED | OUTPATIENT
Start: 2024-03-28 | End: 2024-03-28

## 2024-03-28 RX ORDER — PANTOPRAZOLE SODIUM 40 MG/1
40 TABLET, DELAYED RELEASE ORAL
Status: DISCONTINUED | OUTPATIENT
Start: 2024-03-28 | End: 2024-03-31 | Stop reason: HOSPADM

## 2024-03-28 RX ORDER — INSULIN LISPRO 100 [IU]/ML
2-12 INJECTION, SOLUTION INTRAVENOUS; SUBCUTANEOUS
Status: DISCONTINUED | OUTPATIENT
Start: 2024-03-28 | End: 2024-03-31 | Stop reason: HOSPADM

## 2024-03-28 RX ORDER — CLOPIDOGREL BISULFATE 75 MG/1
300 TABLET ORAL ONCE
Status: COMPLETED | OUTPATIENT
Start: 2024-03-28 | End: 2024-03-28

## 2024-03-28 RX ORDER — ONDANSETRON 2 MG/ML
4 INJECTION INTRAMUSCULAR; INTRAVENOUS EVERY 4 HOURS PRN
Status: DISCONTINUED | OUTPATIENT
Start: 2024-03-28 | End: 2024-03-31 | Stop reason: HOSPADM

## 2024-03-28 RX ORDER — TRAZODONE HYDROCHLORIDE 100 MG/1
100 TABLET ORAL
Status: DISCONTINUED | OUTPATIENT
Start: 2024-03-28 | End: 2024-03-31 | Stop reason: HOSPADM

## 2024-03-28 RX ORDER — RANOLAZINE 500 MG/1
1000 TABLET, EXTENDED RELEASE ORAL 2 TIMES DAILY
Status: DISCONTINUED | OUTPATIENT
Start: 2024-03-28 | End: 2024-03-31 | Stop reason: HOSPADM

## 2024-03-28 RX ORDER — BUSPIRONE HYDROCHLORIDE 5 MG/1
5 TABLET ORAL 2 TIMES DAILY
Status: DISCONTINUED | OUTPATIENT
Start: 2024-03-28 | End: 2024-03-31 | Stop reason: HOSPADM

## 2024-03-28 RX ORDER — FUROSEMIDE 40 MG/1
TABLET ORAL
Qty: 180 TABLET | Refills: 3 | Status: SHIPPED | OUTPATIENT
Start: 2024-03-28

## 2024-03-28 RX ORDER — ENOXAPARIN SODIUM 100 MG/ML
40 INJECTION SUBCUTANEOUS EVERY 12 HOURS
Status: DISCONTINUED | OUTPATIENT
Start: 2024-03-28 | End: 2024-03-31 | Stop reason: HOSPADM

## 2024-03-28 RX ADMIN — RANOLAZINE 1000 MG: 500 TABLET, EXTENDED RELEASE ORAL at 17:45

## 2024-03-28 RX ADMIN — BUSPIRONE HYDROCHLORIDE 5 MG: 5 TABLET ORAL at 13:50

## 2024-03-28 RX ADMIN — RANOLAZINE 1000 MG: 500 TABLET, EXTENDED RELEASE ORAL at 14:14

## 2024-03-28 RX ADMIN — GABAPENTIN 800 MG: 400 CAPSULE ORAL at 17:45

## 2024-03-28 RX ADMIN — FLUTICASONE FUROATE AND VILANTEROL TRIFENATATE 1 PUFF: 200; 25 POWDER RESPIRATORY (INHALATION) at 16:40

## 2024-03-28 RX ADMIN — BACLOFEN 10 MG: 10 TABLET ORAL at 21:33

## 2024-03-28 RX ADMIN — ATORVASTATIN CALCIUM 80 MG: 40 TABLET, FILM COATED ORAL at 17:45

## 2024-03-28 RX ADMIN — BUSPIRONE HYDROCHLORIDE 5 MG: 5 TABLET ORAL at 17:47

## 2024-03-28 RX ADMIN — DOCUSATE SODIUM 100 MG: 100 CAPSULE, LIQUID FILLED ORAL at 17:46

## 2024-03-28 RX ADMIN — ASPIRIN 81 MG CHEWABLE TABLET 324 MG: 81 TABLET CHEWABLE at 11:28

## 2024-03-28 RX ADMIN — CLOPIDOGREL BISULFATE 300 MG: 75 TABLET ORAL at 11:28

## 2024-03-28 RX ADMIN — BACLOFEN 10 MG: 10 TABLET ORAL at 17:46

## 2024-03-28 RX ADMIN — PANTOPRAZOLE SODIUM 40 MG: 40 TABLET, DELAYED RELEASE ORAL at 17:45

## 2024-03-28 RX ADMIN — DOCUSATE SODIUM 100 MG: 100 CAPSULE, LIQUID FILLED ORAL at 13:50

## 2024-03-28 RX ADMIN — TAMSULOSIN HYDROCHLORIDE 0.4 MG: 0.4 CAPSULE ORAL at 17:47

## 2024-03-28 RX ADMIN — ENOXAPARIN SODIUM 40 MG: 40 INJECTION SUBCUTANEOUS at 17:44

## 2024-03-28 RX ADMIN — GABAPENTIN 800 MG: 400 CAPSULE ORAL at 21:33

## 2024-03-28 RX ADMIN — GABAPENTIN 800 MG: 400 CAPSULE ORAL at 14:15

## 2024-03-28 RX ADMIN — TRAZODONE HYDROCHLORIDE 100 MG: 100 TABLET ORAL at 21:33

## 2024-03-28 NOTE — ASSESSMENT & PLAN NOTE
Patient presenting today due to right facial droop.  Stated that it started with slurring of words last night however that has improved    Stroke alert called in the ED:  Troponins negative  CTH: -Within the limitations of streak artifact from anterior communicating artery aneurysm coiling no vascular territory infarct or intracranial hemorrhage. 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 Possible enhancement along the medial margin of the coil mass which may represent volume averaging from A2 segment versus residual aneurysm, unchanged. 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 There is distal reconstitution of the hypoplastic vertebral artery. -Otherwise no high-grade stenosis, dissection or aneurysm involving the carotid or right vertebral arteries    Discussed with neuro and neuro following  MRI brain  Echo  DAPT loaded-> continue DAPT maintenance dose thereafter  Continue home lipitor  Neuro checks  A1c  TSH  Lipid panel  Allow for permissive HTN  PT/OT/ST

## 2024-03-28 NOTE — ASSESSMENT & PLAN NOTE
Lab Results   Component Value Date    HGBA1C 6.2 (H) 03/28/2024       Recent Labs     03/28/24  1007 03/28/24  1217 03/28/24  1716 03/28/24 2051   POCGLU 123 134 159* 137       Blood Sugar Average: Last 72 hrs:  (P) 138.25    HgbA1c 6.2  ISS  Hypoglycemic protocol  Hold oral meds

## 2024-03-28 NOTE — CONSULTS
Consultation - Stroke   Aristeo McneillCapo 63 y.o. male MRN: [id number]  Unit/Bed#: -01 Encounter: [id number]      Assessment/Plan     * Stroke-like symptoms  Assessment & Plan  63-year-old male with history of stroke in 2005 with residual visual field deficit, hx of cerebral aneurysm s/p coiling in 2003, chronic migraines, ARLEEN, CAD, COPD, chronic pain s/p spinal cord stimulator, HTN, DM2, dyslipidemia, h/o gastric bypass, CHF, presents with right facial droop and right-sided weakness/numbness, present since around 1930 on 3/27/24.    -CT head demonstrated no acute changes. Chronic bilateral occipital lobe and left cerebellar infarcts.  -CTA head/neck demonstrated coil embolization of the Acomm, occlusion of the left vertebral artery.  There was streak artifact from the coiling obscuring the right ICA terminus and bilateral A1 and proximal A2 segments.  -BP on presentation 169/98.  -Patient admits he has not been taking his ASA/Plavix on a regular basis.    Neurologic exam sig for visual field deficit (inconsistent with confrontational testing but appeared to blink less to threat on right) diminished sensation R face, subtle R facial asymmetry, and RLE drift.    Plan:  -Admit to stroke pathway.  -MRI brain without contrast.  -2D echocardiogram with bubble study.  -Check hemoglobin A1c, lipid panel, TSH.  -Load ASA 325mg and Plavix 300mg and continue aspirin 81 mg and Plavix 75mg daily.  -Continue home atorvastatin 80 mg.  -Telemetry.  -PT/OT/speech therapy.  -Allow permissive hypertension for now.  -Frequent neuro checks.  -Continue to monitor and notify with changes.        Thrombolytic Decision: Patient not a candidate. Unclear time of onset outside appropriate time window.      Recommendations for outpatient neurological follow up have yet to be determined.    History of Present Illness     Reason for Consult / Principal Problem: R facial droop and R-sided numbness  Hx and PE limited by: n/a  Patient  last known well: prior to 1930 on 3/27/24  Stroke alert called: 1011 on 3/28/24  Neurology time of arrival: 1013  HPI: Aristeo Hall is a 63 y.o. right handed male with history of stroke in 2005 with residual visual field deficit, hx of cerebral aneurysm s/p coiling in 2003, chronic migraines, ARLEEN, CAD, COPD, chronic pain s/p spinal cord stimulator, HTN, DM2, dyslipidemia, h/o gastric bypass, CHF, presents with right facial droop and right-sided weakness/numbness.    Patient states that around 1930 last evening he was using [id number] to send a message with his phone but the phone was not typing what he was saying.  He went to the bathroom and saw that the right side of his face was drooping, and he also noticed his right arm and right leg felt numb/heavy.  He decided to wait to see if the symptoms would improve as he had a doctor's appointment this morning.  He states he was told by his doctor to present to the ED for further evaluation.  He feels the symptoms have improved but have not resolved.  He admits that though he is prescribed aspirin and Plavix, he will often miss doses.    CT head demonstrated no acute changes. Chronic bilateral occipital lobe and left cerebellar infarcts.  CTA head/neck demonstrated coil embolization of the Acomm, occlusion of the left vertebral artery.  There was streak artifact from the coiling obscuring the right ICA terminus and bilateral A1 and proximal A2 segments.    Neurologic exam sig for visual field deficit (inconsistent with confrontational testing but appeared to blink less to threat on right) diminished sensation R face, subtle R facial asymmetry, and RLE drift.    Consult to Neurology  Consult performed by: Amanda Rosales PA-C  Consult ordered by: Sy Cruz DO          Review of Systems   Constitutional: Negative.    HENT: Negative.     Eyes:  Positive for visual disturbance.   Respiratory: Negative.     Cardiovascular: Negative.     Gastrointestinal: Negative.    Endocrine: Negative.    Genitourinary: Negative.    Musculoskeletal: Negative.    Skin:  Negative for rash.   Allergic/Immunologic: Negative.    Neurological:  Positive for weakness and numbness.        As above.   Hematological: Negative.    Psychiatric/Behavioral: Negative.         Historical Information   Past Medical History:   Diagnosis Date    Acute on chronic diastolic congestive heart failure (HCC)     Altered gait     Alzheimer disease (formerly [facility])     per patients,,early onset     Angina pectoris (formerly [facility])     Anxiety     Arthritis     Brain aneurysm     coils placed    Cardiac disease     Chest pain 01/13/2016    Chronic kidney disease     Chronic pain     back/ right groin and rle- has morphine pump    Constipation     COPD (chronic obstructive pulmonary disease) (formerly [facility])     Coronary artery disease     CPAP (continuous positive airway pressure) dependence     Decubital ulcer     sacral decub-occured 5/2019-sees wound care/debide in OR today 6/6/2019    Dependent on walker for ambulation     w/c for long distance    Depression     Diabetes mellitus (formerly [facility])     insulin dependent    Dizziness     occ    Dysphagia     Enlarged prostate     Esophageal stricture In file    Esophageal varices (formerly [facility])     Fall     Fall at home 05/03/2019    GERD (gastroesophageal reflux disease)     Heart failure (formerly [facility])     Hiatal hernia     Hx of gastric bypass 11/19/2018    Hypercholesterolemia     Hypertension     MI (myocardial infarction) (formerly [facility])     2017- stents x2    Migraine     Morbid obesity (formerly [facility])     gastric bypass sleeve 11/2018-wt loss 125 lb    Neuropathy     Oxygen dependent     Q HS  2LPM with CPAP and prn during day 2-3 LPM     Pressure injury of skin     Pressure injury of skin of sacral region 06/03/2019    Added automatically from request for surgery [id number]    Renal disorder     Shortness of breath     Skin abnormality     sacral wound - covered with pad    Sleep apnea     Stented coronary artery      Stroke ([facility])     vision loss b/l  2005, residual R leg weakness    Type 2 diabetes mellitus with diabetic neuropathy, with long-term current use of insulin ([facility]) 10/18/2019    Type 2 diabetes mellitus with renal complication ([facility])     insulin dependent    Urinary frequency     Use of cane as ambulatory aid     Wears dentures     Wears glasses     Wears glasses     Wheelchair dependent      Past Surgical History:   Procedure Laterality Date    BACK SURGERY      BRAIN SURGERY      CARDIAC CATHETERIZATION      with stents    CARDIAC CATHETERIZATION N/A 8/18/2023    Procedure: Cardiac Coronary Angiogram;  Surgeon: Horacio Weiner MD;  Location: [facility];  Service: Cardiology    CEREBRAL ANEURYSM REPAIR      with coils    COLONOSCOPY      ESOPHAGOGASTRODUODENOSCOPY N/A 7/1/2016    Procedure: ESOPHAGOGASTRODUODENOSCOPY (EGD);  Surgeon: Reno Christiansen MD;  Location: [facility];  Service:     GASTRIC BYPASS  11/19/2018    HERNIA REPAIR      HIATAL HERNIA REPAIR      INFUSION PUMP IMPLANTATION Left     morphine    INTRATHECAL PUMP IMPLANTATION Left 7/9/2020    Procedure: REVISION INTRATHECAL PAIN PUMP POCKET, LEFT ABDOMEN;  Surgeon: Homero Cho MD;  Location: [facility];  Service: Neurosurgery    KNEE ARTHROSCOPY Right     KNEE ARTHROSCOPY Right     PERONEAL NERVE DECOMPRESSION Right     FL DEBRIDEMENT OPEN WOUND FIRST 20 SQ CM/< N/A 6/6/2019    Procedure: EXCISIONAL DEBRIDEMENT OF SACRAL DECUBITUS ULCER;  Surgeon: Siddhartha Omer MD;  Location: [facility];  Service: General    FL ESOPHAGOGASTRODUODENOSCOPY TRANSORAL DIAGNOSTIC N/A 2/27/2017    Procedure: ESOPHAGOGASTRODUODENOSCOPY (EGD);  Surgeon: Reno Christiansen MD;  Location: [facility];  Service: Gastroenterology    FL ESOPHAGOGASTRODUODENOSCOPY TRANSORAL DIAGNOSTIC N/A 8/23/2018    Procedure: ESOPHAGOGASTRODUODENOSCOPY (EGD) with biopsy;  Surgeon: Reno Christiansen MD;  [facility]: [facility];  Service: Gastroenterology    FL IMPLTJ/RPLCMT ITHCL/EDRL DRUG NFS PRGRBL  PUMP Left 10/13/2020    Procedure: EXPLORATION OF INTRATHECAL PAIN PUMP SYSTEM INTEGRITY FOR POSSIBLE REPLACEMENT OF CATHETER AND PUMP.;  Surgeon: Homero Cho MD;  Location: [facility];  Service: Neurosurgery    FL IMPLTJ/RPLCMT ITHCL/EDRL DRUG NFS SUBQ RSVR N/A 1/19/2017    Procedure: REMOVAL / EXCHANGE INTRATHECAL PAIN PUMP;  Surgeon: Que Leonard MD;  [facility];  Service: Orthopedics    FL IMPLTJ/RPLCMT ITHCL/EDRL DRUG NFS SUBQ RSVR N/A 5/16/2016    Procedure: REPLACEMENT AND PROGRAM PUMP ;  Surgeon: Que Leonard MD;  [facility];  Service: Orthopedics    FL PRQ IMPLTJ NSTIM ELECTRODE ARRAY EPIDURAL Right 3/17/2021    Procedure: INSERTION THORACIC DORSAL COLUMN SPINAL CORD STIMULATOR PERCUTANEOUS W IMPLANTABLE PULSE GENERATOR, RIGHT;  Surgeon: Homero Cho MD;  Location[facility];  Service: Neurosurgery     Social History   Social History     Substance and Sexual Activity   Alcohol Use Not Currently     Social History     Substance and Sexual Activity   Drug Use Not Currently    Types: Marijuana     E-Cigarette/Vaping    E-Cigarette Use Never User      E-Cigarette/Vaping Substances    Nicotine No     THC No     CBD No     Flavoring No     Other No     Unknown No      Social History     Tobacco Use   Smoking Status Never   Smokeless Tobacco Never     Family History:   Family History   Problem Relation Age of Onset    Diabetes unspecified Mother     Diabetes Mother     Heart attack Father     Heart disease Father     Hypertension Father     Stroke Father     Diabetes unspecified Brother     Depression Brother     Mental illness Brother     Diabetes unspecified Brother     Diabetes unspecified Maternal Grandmother     Diabetes unspecified Paternal Grandmother     Diabetes unspecified Paternal Uncle     ADD / ADHD Cousin     Colon cancer Neg Hx     Colon polyps Neg Hx        Review of previous medical records was completed.     Meds/Allergies   PTA meds:   Prior to Admission Medications    Prescriptions Last Dose Informant Patient Reported? Taking?   CALCIUM PO  Self Yes No   Sig: Take 1 tablet by mouth daily   Cyanocobalamin (VITAMIN B-12 PO)  Self Yes No   Sig: Take 1 tablet by mouth daily   Empagliflozin (JARDIANCE) 10 MG TABS tablet   No No   Sig: Take 1 tablet (10 mg total) by mouth every morning   Fluticasone-Salmeterol (Advair Diskus) 500-50 mcg/dose inhaler  Self No No   Sig: Inhale 1 puff 2 (two) times a day Rinse mouth after use   Klor-Con M20 20 MEQ tablet  Self No No   Sig: TAKE 1 TABLET BY MOUTH EVERY DAY   albuterol (2.5 mg/3 mL) 0.083 % nebulizer solution  Self No No   Sig: Take 3 mL (2.5 mg total) by nebulization every 6 (six) hours as needed for wheezing or shortness of breath   amLODIPine (NORVASC) 5 mg tablet  Self No No   Sig: TAKE 1 TABLET (5 MG TOTAL) BY MOUTH DAILY.   ammonium lactate (LAC-HYDRIN) 12 % cream  Self No No   Sig: Apply topically as needed for dry skin   aspirin 81 mg chewable tablet  Self No No   Sig: Chew 1 tablet (81 mg total) daily   atorvastatin (LIPITOR) 80 mg tablet  Self No No   Sig: TAKE 1 TABLET (80 MG TOTAL) BY MOUTH DAILY AFTER DINNER   baclofen 10 mg tablet  Self Yes No   Sig: Take 10 mg by mouth 3 (three) times a day   busPIRone (BUSPAR) 5 mg tablet  Self Yes No   Sig: Take 5 mg by mouth 2 (two) times a day   carvedilol (COREG) 12.5 mg tablet  Self No No   Sig: TAKE 1 TABLET BY MOUTH TWICE A DAY WITH FOOD   clopidogrel (PLAVIX) 75 mg tablet  Self No No   Sig: TAKE 1 TABLET BY MOUTH EVERY DAY   docusate sodium (COLACE) 100 mg capsule  Self No No   Sig: Take 1 capsule (100 mg total) by mouth 2 (two) times a day   folic acid (FOLVITE) 1 mg tablet  Self No No   Sig: Take 1 tablet (1 mg total) by mouth daily   furosemide (LASIX) 40 mg tablet   No No   Sig: Take once in AM daily. Take once daily in PM PRN weight gain, edema.   gabapentin (NEURONTIN) 300 mg capsule  Self No No   Sig: Take 1 capsule by mouth every eight hours up to three doses as needed for  migraine headache.   gabapentin (NEURONTIN) 800 mg tablet  Self Yes No   Sig: Take 800 mg by mouth 4 (four) times a day   hydrocortisone 1 % lotion  Self No No   Sig: Apply topically if needed for rash   lidocaine (LIDODERM) 5 %  Self Yes No   Sig: Apply 1 patch topically daily back   losartan (COZAAR) 50 mg tablet  Self No No   Sig: TAKE 1 TABLET BY MOUTH EVERY DAY   methocarbamol (ROBAXIN) 750 mg tablet  Self No No   Sig: TAKE 1 TABLET (750 MG TOTAL) BY MOUTH EVERY 6 (SIX) HOURS AS NEEDED FOR MUSCLE SPASMS   naloxegol oxalate (MOVANTIK) 25 MG tablet  Self No No   Sig: Take 1 tablet (25 mg total) by mouth daily in the early morning   nitroglycerin (Nitrostat) 0.4 mg SL tablet  Self No No   Sig: Place 1 tablet (0.4 mg total) under the tongue every 5 (five) minutes as needed for chest pain (pt has not  used recently)   nystatin (MYCOSTATIN) cream  Self No No   Sig: Apply 2 g (1 Application total) topically 2 (two) times a day   omeprazole (PriLOSEC) 40 MG capsule  Self No No   Sig: Take 1 capsule (40 mg total) by mouth 2 (two) times a day before meals   ondansetron (ZOFRAN) 4 mg tablet  Self No No   Sig: Take 1 tablet (4 mg total) by mouth every 8 (eight) hours as needed for nausea or vomiting   predniSONE 10 mg tablet  Self No No   Sig: Take 2 a day for 1 week then 1 a day for 14 days   ranolazine (RANEXA) 1000 MG SR tablet  Self No No   Sig: TAKE 1 TABLET (1,000 MG TOTAL) BY MOUTH EVERY 12 HOURS   tamsulosin (FLOMAX) 0.4 mg  Self No No   Sig: TAKE 1 CAPSULE BY MOUTH EVERY DAY WITH DINNER   traZODone (DESYREL) 50 mg tablet  Self No No   Sig: TAKE 1 TABLET BY MOUTH TWICE A DAY   Patient taking differently: Take 100 mg by mouth daily at bedtime   vitamin B-12 (VITAMIN B-12) 1,000 mcg tablet  Self No No   Sig: Take 1 tablet (1,000 mcg total) by mouth daily      Facility-Administered Medications: None       No Known Allergies    Objective   Vitals:Blood pressure 154/94, pulse 56, temperature 98.8 °F (37.1 °C),  temperature source Oral, resp. rate 18, height 6' (1.829 m), weight (!) 145 kg (318 lb 12.6 oz), SpO2 95%.,Body mass index is 43.24 kg/m².  No intake or output data in the 24 hours ending 24 1236    Invasive Devices:   Invasive Devices       Peripheral Intravenous Line  Duration             Peripheral IV 24 Right Antecubital <1 day                    Physical Exam  General:  Patient is well-developed, morbidly obese BMI 43.24, and in no acute distress.  HEENT:  Head normocephalic.  Eyes anicteric.    Cardiovascular:  With regular rhythm.  Lungs:  Normal effort. Nonlabored breathing.  Extremities:  With no significant edema.    Skin: No rashes.    Neurologic Exam AP acting as scribe for attending exam  Neurologic:  Patient is alert, pleasantly interactive, and appropriately conversational.  No obvious symbolic language difficulty or dysarthria.    Gait deferred for safety.    Cranial Nerves:   II: Less consistent blink to threat on right. Cannot visualize optic fundi.  III,IV,VI: extraocular movements intact with no nystagmus.   V: Diminished LT appreciation R face as compared to L.  VII: Subtle R facial asymmetry noted.  XII: Tongue midline with no atrophy or fasciculations with appropriate movement.     Coordination:  Accurate with finger-to-nose maneuvers bilaterally.    Motor testing with no UE or LLE drift but there was drift in RLE.    Sensory testing grossly intact to light touch throughout.     NIHSS:  1a.Level of Consciousness: 0 = Alert   1b. LOC Questions: 0 = Answers both correctly   1c. LOC Commands: 0 = Obeys both correctly   2. Best Gaze: 0 = Normal   3. Visual: 1 = Partial hemianopia   4. Facial Palsy: 1=Minor paralysis (flattened nasolabial fold, asymmetric on smiling)   5a. Motor Right Arm: 0=No drift, limb holds 90 (or 45) degrees for full 10 seconds   5b. Motor Left Arm: 0=No drift, limb holds 90 (or 45) degrees for full 10 seconds   6a. Motor Right Le=Drift, limb holds 90 (or 45)  degrees but drifts down before full 10 seconds: does not hit bed   6b. Motor Left Le=No drift, limb holds 90 (or 45) degrees for full 10 seconds   7. Limb Ataxia:  0=Absent   8. Sensory: 1=Mild to moderate sensory loss; patient feels pinprick is less sharp or is dull on the affected side; there is a loss of superficial pain with pinprick but patient is aware He is being touched   9. Best Language:  0=No aphasia, normal   10. Dysarthria: 0=Normal articulation   11. Extinction and Inattention (formerly Neglect): 0=No abnormality   Total Score: 4     Time NIHSS was completed: 1030    Modified Consuelo Score:  2 (Unable to carry out all previous activities, but able to look after own affairs without assistance)    Lab Results: CBC:   Results from last 7 days   Lab Units 24  1034   WBC Thousand/uL 12.59*   RBC Million/uL 4.77   HEMOGLOBIN g/dL 14.2   HEMATOCRIT % 44.8   MCV fL 94   PLATELETS Thousands/uL 254   , BMP/CMP:   Results from last 7 days   Lab Units 24  1034   SODIUM mmol/L 140   POTASSIUM mmol/L 3.9   CHLORIDE mmol/L 105   CO2 mmol/L 29   BUN mg/dL 15   CREATININE mg/dL 1.06   CALCIUM mg/dL 8.8   EGFR ml/min/1.73sq m 75     Imaging Studies: I have personally reviewed pertinent reports.   and I have personally reviewed pertinent films in PACS CTH, CTA h/n  EKG, Pathology, and Other Studies: I have personally reviewed pertinent reports.    VTE Prophylaxis: Sequential compression device (Venodyne)     Code Status: Prior  Advance Directive and Living Will:      Power of :    POLST:

## 2024-03-28 NOTE — ASSESSMENT & PLAN NOTE
Lab Results   Component Value Date    HGBA1C 6.2 01/09/2024       Recent Labs     03/28/24  1007   POCGLU 123       Blood Sugar Average: Last 72 hrs:  (P) 123    Get updated A1c  ISS  Hypoglycemic protocol  Hold oral meds

## 2024-03-28 NOTE — ASSESSMENT & PLAN NOTE
Continue statin and ranexa  Lipid panel with elevated triglycerides. Total cholesterol and LDL WNL

## 2024-03-28 NOTE — PLAN OF CARE
Problem: PAIN - ADULT  Goal: Verbalizes/displays adequate comfort level or baseline comfort level  Description: Interventions:  - Encourage patient to monitor pain and request assistance  - Assess pain using appropriate pain scale  - Administer analgesics based on type and severity of pain and evaluate response  - Implement non-pharmacological measures as appropriate and evaluate response  - Consider cultural and social influences on pain and pain management  - Notify physician/advanced practitioner if interventions unsuccessful or patient reports new pain  Outcome: Progressing     Problem: Knowledge Deficit  Goal: Patient/family/caregiver demonstrates understanding of disease process, treatment plan, medications, and discharge instructions  Description: Complete learning assessment and assess knowledge base.  Interventions:  - Provide teaching at level of understanding  - Provide teaching via preferred learning methods  Outcome: Progressing     Problem: NEUROSENSORY - ADULT  Goal: Achieves stable or improved neurological status  Description: INTERVENTIONS  - Monitor and report changes in neurological status  - Monitor vital signs such as temperature, blood pressure, glucose, and any other labs ordered   - Initiate measures to prevent increased intracranial pressure  - Monitor for seizure activity and implement precautions if appropriate      Outcome: Progressing

## 2024-03-28 NOTE — PROGRESS NOTES
Cardiology Outpatient Follow-Up Note - Aristeo W Rafael 63 y.o. male MRN: [id number]      Assessment/Plan:  1. Chronic diastolic congestive heart failure (HCC)  He has been taking extra doses to lasix due to weight gain and edema, currently rx'd 40 mg daily.  Will optimize GDMT for diastolic CHF with addition of Jardiance 10 mg daily. Continue lasix 40 mg daily with flex dose 40 mg in the PM.   - Empagliflozin (JARDIANCE) 10 MG TABS tablet; Take 1 tablet (10 mg total) by mouth every morning  Dispense: 30 tablet; Refill: 11  - furosemide (LASIX) 40 mg tablet; Take once in AM daily. Take once daily in PM PRN weight gain, edema.  Dispense: 180 tablet; Refill: 3    2. Myocarditis, unspecified chronicity, unspecified myocarditis type (HCC)      3. Pure hypercholesterolemia  Continue atorvastatin 80 mg daily. His goal LDL is < 55 mg/dL. His LDL was 81 mg/dL on atorvastatin 40 mg daily. Pending recheck.     4. Coronary artery disease of native artery of native heart with stable angina pectoris (HCC)  Stable.  Continue beta blocker coreg 12.5 mg BID  Continue amlodipine 5 mg daily for HTN, serves as additional anti-anginal  Continue aspirin 81 mg daily and clopidogrel 75 mg daily. Can consider reducing to antiplatelet monotherapy at next visit.  Continue atorvastatin 80 mg daily, see above  Continue Ranexa 1000 mg BID  Starting Jardiance as above which will also reduce secondary ASCVD risk.   - POCT ECG  - furosemide (LASIX) 40 mg tablet; Take once in AM daily. Take once daily in PM PRN weight gain, edema.  Dispense: 180 tablet; Refill: 3    5. Facial droop  Patient is being sent emergently to [facility] ER for stroke evaluation.   He has prior history of cerebral aneurysm, coiled, and prior CVA  Is on DAPT  Concern for ischemic CVA vs hemorrhagic in s/o DAPT tx. Needs urgent/emergent brain imaging.  Patient agreeable to go to ER. Declines ambulance, he will transport himself to ER.     We will see Aristeo W Rafael  back in 2 months for routine follow-up.    Subjective:     HPI: Aristeo Hall is a 63 y.o. year old male with long history of CAD and multiple PCI, myopericarditis in Aug 2023, T2DM, prior CVA, cerebral aneurysm s/p coiling, CKD2, chronic pain disorder s/p spinal cord stimulator, COPD, presenting today for follow-up.    Last night he noticed slurring speech and looked in the mirror and noticed the right side of his face is drooping. He also noticed numbness in his right leg.     His chest pain is stable and has not changed since prior visit.       Cardiac Testing:    Cardiac MRI 8/31/23  Findings:  1. The left ventricle is normal in size with mildly increased wall thickness.  Left ventricular systolic function is normal with a measured ejection fraction of 58%.  There are no regional left ventricular wall motion abnormalities.  2. The right ventricle is normal in size with normal right ventricular systolic function.  3. The aortic, mitral, and tricuspid valves open without restriction.  There is no significant valvular regurgitation, however cine MRI is inaccurate in the qualitative assessment of valvular regurgitation.  4. The left atrium is normal in size. The aortic root is normal in size.  5. There is evidence of myocardial edema within the basal anteroseptal and basal inferior walls.  6. There is no obvious evidence of pericardial inflammation.  7. Delayed post-gadolinium imaging demonstrates patchy intramyocardial hyperenhancement of the basal anteroseptal and basal inferior walls.     IMPRESSION:  Impression:  1. Normal left and right ventricular size and systolic function.  2. No significant valvular abnormalities.  3. No obvious evidence of pericarinal inflammation.  4. Patchy intramyocardial fibrosis/edema involving the basal anteroseptal and basal inferior walls.  This is highly suggestive of acute myocarditis.       History of coronary artery disease  2010 per patient had his first MI with  stenting of unknown vessel  2015 [facility]: Distal LAD 85%, proximal circumflex 80%, distal circumflex 90%, first obtuse marginal 80%, mid RCA 40%, distal RCA 40%, PCI/OMER x1 to proximal circumflex  2017 [facility]: Distal LAD 95%, proximal circumflex stent patent, mid RCA 85%, PCI/OMER x1 to mid RCA  2020 [facility]: LM normal, proximal LAD 85%, mid LAD 5-10%, circumflex 20% mid lesion, obtuse marginal branches luminal irregularities, proximal RCA 20%, distal RCA 30%, PCI/OMER x1 to proximal LAD  2022 [facility]: LM luminal irregularities, ostial LAD 30%, proximal LAD stent patent, mid LAD 80% stenosis, ostial to mid circumflex 20%, obtuse marginal branch with luminal irregularities, proximal RCA 20%, distal RCA 30%, RPDA 50%, PCI/OMER x1 in proximal to mid LAD  8/2023 [facility]: Distal left main 50%, ostial LAD 40%, mid LAD 40%, mid circumflex 20%, patent LAD stent, circumflex, and RCA stents, medical management recommended           EKGs, personally reviewed:  EKG 3/28/24 - NSR, 61 bpm, left axis, RBBB + LAFB ( bifascicular block), abnormal study.     Relevant Labs & Results:  BMP 1/5/24 - K 4.0, Cr 1.17, GFR 66  CBC 3/7/24 - Hgb 14.5 g/dL   HbA1c 1/9/24 - 6.2%  Lipid profile 8/19/23 - , , HDL 30, LDL 81 mg/dL (atorvastatin 40 mg daily)      ROS:  Review of Systems:  Review of Systems    Objective:     Vitals:   Vitals:    03/28/24 0919   Height: 6' (1.829 m)    Body surface area is 2.55 meters squared.  Wt Readings from Last 3 Encounters:   03/26/24 (!) 138 kg (304 lb)   03/20/24 (!) 138 kg (305 lb)   01/31/24 (!) 139 kg (306 lb 3.2 oz)       Physical Exam:  General: Aristeo Hall is an obese male, in no acute distress, sitting comfortably  HEENT: moist mucous membranes, EOMI  Neck:  No JVD, supple, trachea midline  Cardiovascular: unremarkable S1/S2, regular rate and rhythm, no murmurs, rubs or gallops  Pulmonary: normal respiratory effort, CTAB  Abdomen: soft and nondistended  Extremities: trace lower extremity edema. Warm  and well perfused extremities.  Neuro: R facial droop  Psych: Normal mood and affect, cooperative      Medications (at the START of this encounter):  Outpatient Medications Prior to Visit   Medication Sig Dispense Refill    albuterol (2.5 mg/3 mL) 0.083 % nebulizer solution Take 3 mL (2.5 mg total) by nebulization every 6 (six) hours as needed for wheezing or shortness of breath 720 mL 0    amLODIPine (NORVASC) 5 mg tablet TAKE 1 TABLET (5 MG TOTAL) BY MOUTH DAILY. 90 tablet 0    ammonium lactate (LAC-HYDRIN) 12 % cream Apply topically as needed for dry skin 385 g 2    aspirin 81 mg chewable tablet Chew 1 tablet (81 mg total) daily 90 tablet 1    atorvastatin (LIPITOR) 80 mg tablet TAKE 1 TABLET (80 MG TOTAL) BY MOUTH DAILY AFTER DINNER 90 tablet 0    baclofen 10 mg tablet Take 10 mg by mouth 3 (three) times a day      busPIRone (BUSPAR) 5 mg tablet Take 5 mg by mouth 2 (two) times a day      CALCIUM PO Take 1 tablet by mouth daily      carvedilol (COREG) 12.5 mg tablet TAKE 1 TABLET BY MOUTH TWICE A DAY WITH FOOD 60 tablet 3    clopidogrel (PLAVIX) 75 mg tablet TAKE 1 TABLET BY MOUTH EVERY DAY 90 tablet 0    Cyanocobalamin (VITAMIN B-12 PO) Take 1 tablet by mouth daily      docusate sodium (COLACE) 100 mg capsule Take 1 capsule (100 mg total) by mouth 2 (two) times a day 180 capsule 3    Fluticasone-Salmeterol (Advair Diskus) 500-50 mcg/dose inhaler Inhale 1 puff 2 (two) times a day Rinse mouth after use 60 blister 3    folic acid (FOLVITE) 1 mg tablet Take 1 tablet (1 mg total) by mouth daily 90 tablet 1    furosemide (LASIX) 40 mg tablet TAKE 1 TABLET BY MOUTH EVERY DAY 90 tablet 0    gabapentin (NEURONTIN) 300 mg capsule Take 1 capsule by mouth every eight hours up to three doses as needed for migraine headache. 90 capsule 0    gabapentin (NEURONTIN) 600 MG tablet Take 1,200 mg by mouth 4 (four) times a day      hydrocortisone 1 % lotion Apply topically if needed for rash 59 mL 1    Klor-Con M20 20 MEQ tablet  TAKE 1 TABLET BY MOUTH EVERY DAY 90 tablet 0    lidocaine (LIDODERM) 5 %       losartan (COZAAR) 50 mg tablet TAKE 1 TABLET BY MOUTH EVERY DAY 90 tablet 1    methocarbamol (ROBAXIN) 750 mg tablet TAKE 1 TABLET (750 MG TOTAL) BY MOUTH EVERY 6 (SIX) HOURS AS NEEDED FOR MUSCLE SPASMS 120 tablet 0    naloxegol oxalate (MOVANTIK) 25 MG tablet Take 1 tablet (25 mg total) by mouth daily in the early morning 30 tablet 5    nitroglycerin (Nitrostat) 0.4 mg SL tablet Place 1 tablet (0.4 mg total) under the tongue every 5 (five) minutes as needed for chest pain (pt has not  used recently) 30 tablet 0    nystatin (MYCOSTATIN) cream Apply 2 g (1 Application total) topically 2 (two) times a day 15 g 0    omeprazole (PriLOSEC) 40 MG capsule Take 1 capsule (40 mg total) by mouth 2 (two) times a day before meals 60 capsule 5    ondansetron (ZOFRAN) 4 mg tablet Take 1 tablet (4 mg total) by mouth every 8 (eight) hours as needed for nausea or vomiting 60 tablet 3    polyethylene glycol (GLYCOLAX) 17 GM/SCOOP powder Take 17 g by mouth 2 (two) times a day 850 g 5    predniSONE 10 mg tablet Take 2 a day for 1 week then 1 a day for 14 days 28 tablet 0    ranolazine (RANEXA) 1000 MG SR tablet TAKE 1 TABLET (1,000 MG TOTAL) BY MOUTH EVERY 12 HOURS 60 tablet 3    tamsulosin (FLOMAX) 0.4 mg TAKE 1 CAPSULE BY MOUTH EVERY DAY WITH DINNER 90 capsule 1    traZODone (DESYREL) 50 mg tablet TAKE 1 TABLET BY MOUTH TWICE A DAY 90 tablet 1    vitamin B-12 (VITAMIN B-12) 1,000 mcg tablet Take 1 tablet (1,000 mcg total) by mouth daily 90 tablet 3     No facility-administered medications prior to visit.                 Time Spent:  Total time (face-to-face and non-face-to-face) spent on today's visit was 25 minutes. This includes preparation for the visits (i.e. reviewing test results), performance of a medically appropriate history and examination, and orders for medications, tests or other procedures. This time is exclusive of procedures performed and  "time spent teaching.      This note was completed in part utilizing [employer] voice recognition software. Grammatical errors, random word insertion, spelling mistakes, occasional wrong word or \"sound-alike\" substitutions and incomplete sentences may be an occasional consequence of the system secondary to software limitations, ambient noise and hardware issues. At the time of dictation, efforts were made to edit, clarify and /or correct errors.  Please read the chart carefully and recognize, using context, where substitutions have occurred.  If you have any questions or concerns about the context, text or information contained within the body of this dictation, please contact myself, the provider, for further clarification.    "

## 2024-03-28 NOTE — ED NOTES
Primary RN sent report to [facility]ART on MS 3 via [employer].      Sandy Almeida RN  03/28/24 7705

## 2024-03-28 NOTE — ED PROVIDER NOTES
History  Chief Complaint   Patient presents with    Facial Droop     Pt reports startting with right sided facial droop and words slurring last evening at 2000. Pt reports facial droop improved since     63-year-old male presents for evaluation of right-sided facial droop with word slurring starting last evening around 8 PM.  Slurring words is slightly improved as well as facial droop.  The patient still has residual sensory deficit.          Prior to Admission Medications   Prescriptions Last Dose Informant Patient Reported? Taking?   CALCIUM PO  Self Yes No   Sig: Take 1 tablet by mouth daily   Cyanocobalamin (VITAMIN B-12 PO)  Self Yes No   Sig: Take 1 tablet by mouth daily   Empagliflozin (JARDIANCE) 10 MG TABS tablet   No No   Sig: Take 1 tablet (10 mg total) by mouth every morning   Fluticasone-Salmeterol (Advair Diskus) 500-50 mcg/dose inhaler  Self No No   Sig: Inhale 1 puff 2 (two) times a day Rinse mouth after use   Klor-Con M20 20 MEQ tablet  Self No No   Sig: TAKE 1 TABLET BY MOUTH EVERY DAY   albuterol (2.5 mg/3 mL) 0.083 % nebulizer solution  Self No No   Sig: Take 3 mL (2.5 mg total) by nebulization every 6 (six) hours as needed for wheezing or shortness of breath   amLODIPine (NORVASC) 5 mg tablet  Self No No   Sig: TAKE 1 TABLET (5 MG TOTAL) BY MOUTH DAILY.   ammonium lactate (LAC-HYDRIN) 12 % cream  Self No No   Sig: Apply topically as needed for dry skin   aspirin 81 mg chewable tablet  Self No No   Sig: Chew 1 tablet (81 mg total) daily   atorvastatin (LIPITOR) 80 mg tablet  Self No No   Sig: TAKE 1 TABLET (80 MG TOTAL) BY MOUTH DAILY AFTER DINNER   baclofen 10 mg tablet  Self Yes No   Sig: Take 10 mg by mouth 3 (three) times a day   busPIRone (BUSPAR) 5 mg tablet  Self Yes No   Sig: Take 5 mg by mouth 2 (two) times a day   carvedilol (COREG) 12.5 mg tablet  Self No No   Sig: TAKE 1 TABLET BY MOUTH TWICE A DAY WITH FOOD   clopidogrel (PLAVIX) 75 mg tablet  Self No No   Sig: TAKE 1 TABLET BY MOUTH  EVERY DAY   docusate sodium (COLACE) 100 mg capsule  Self No No   Sig: Take 1 capsule (100 mg total) by mouth 2 (two) times a day   folic acid (FOLVITE) 1 mg tablet  Self No No   Sig: Take 1 tablet (1 mg total) by mouth daily   furosemide (LASIX) 40 mg tablet   No No   Sig: Take once in AM daily. Take once daily in PM PRN weight gain, edema.   gabapentin (NEURONTIN) 300 mg capsule Not Taking Self No No   Sig: Take 1 capsule by mouth every eight hours up to three doses as needed for migraine headache.   Patient not taking: Reported on 3/28/2024   gabapentin (NEURONTIN) 800 mg tablet  Self Yes No   Sig: Take 800 mg by mouth 4 (four) times a day   hydrocortisone 1 % lotion  Self No No   Sig: Apply topically if needed for rash   lidocaine (LIDODERM) 5 %  Self Yes No   Sig: Apply 1 patch topically daily back   losartan (COZAAR) 50 mg tablet  Self No No   Sig: TAKE 1 TABLET BY MOUTH EVERY DAY   methocarbamol (ROBAXIN) 750 mg tablet  Self No No   Sig: TAKE 1 TABLET (750 MG TOTAL) BY MOUTH EVERY 6 (SIX) HOURS AS NEEDED FOR MUSCLE SPASMS   naloxegol oxalate (MOVANTIK) 25 MG tablet  Self No No   Sig: Take 1 tablet (25 mg total) by mouth daily in the early morning   nitroglycerin (Nitrostat) 0.4 mg SL tablet  Self No No   Sig: Place 1 tablet (0.4 mg total) under the tongue every 5 (five) minutes as needed for chest pain (pt has not  used recently)   nystatin (MYCOSTATIN) cream  Self No No   Sig: Apply 2 g (1 Application total) topically 2 (two) times a day   omeprazole (PriLOSEC) 40 MG capsule  Self No No   Sig: Take 1 capsule (40 mg total) by mouth 2 (two) times a day before meals   ondansetron (ZOFRAN) 4 mg tablet  Self No No   Sig: Take 1 tablet (4 mg total) by mouth every 8 (eight) hours as needed for nausea or vomiting   predniSONE 10 mg tablet  Self No No   Sig: Take 2 a day for 1 week then 1 a day for 14 days   ranolazine (RANEXA) 1000 MG SR tablet  Self No No   Sig: TAKE 1 TABLET (1,000 MG TOTAL) BY MOUTH EVERY 12  HOURS   tamsulosin (FLOMAX) 0.4 mg  Self No No   Sig: TAKE 1 CAPSULE BY MOUTH EVERY DAY WITH DINNER   traZODone (DESYREL) 50 mg tablet  Self No No   Sig: TAKE 1 TABLET BY MOUTH TWICE A DAY   Patient taking differently: Take 100 mg by mouth daily at bedtime   vitamin B-12 (VITAMIN B-12) 1,000 mcg tablet  Self No No   Sig: Take 1 tablet (1,000 mcg total) by mouth daily      Facility-Administered Medications: None       Past Medical History:   Diagnosis Date    Acute on chronic diastolic congestive heart failure (HCC)     Altered gait     Alzheimer disease (HCC)     per patients,,early onset     Angina pectoris ([facility])     Anxiety     Arthritis     Brain aneurysm     coils placed    Cardiac disease     Chest pain 01/13/2016    Chronic kidney disease     Chronic pain     back/ right groin and rle- has morphine pump    Constipation     COPD (chronic obstructive pulmonary disease) ([facility])     Coronary artery disease     CPAP (continuous positive airway pressure) dependence     Decubital ulcer     sacral decub-occured 5/2019-sees wound care/debide in OR today 6/6/2019    Dependent on walker for ambulation     w/c for long distance    Depression     Diabetes mellitus ([facility])     insulin dependent    Dizziness     occ    Dysphagia     Enlarged prostate     Esophageal stricture In file    Esophageal varices ([facility])     Fall     Fall at home 05/03/2019    GERD (gastroesophageal reflux disease)     Heart failure ([facility])     Hiatal hernia     Hx of gastric bypass 11/19/2018    Hypercholesterolemia     Hypertension     MI (myocardial infarction) ([facility])     2017- stents x2    Migraine     Morbid obesity ([facility])     gastric bypass sleeve 11/2018-wt loss 125 lb    Neuropathy     Oxygen dependent     Q HS  2LPM with CPAP and prn during day 2-3 LPM     Pressure injury of skin     Pressure injury of skin of sacral region 06/03/2019    Added automatically from request for surgery [id number]    Renal disorder     Shortness of breath     Skin abnormality      sacral wound - covered with pad    Sleep apnea     Stented coronary artery     Stroke ([facility])     vision loss b/l  2005, residual R leg weakness    Type 2 diabetes mellitus with diabetic neuropathy, with long-term current use of insulin ([facility]) 10/18/2019    Type 2 diabetes mellitus with renal complication ([facility])     insulin dependent    Urinary frequency     Use of cane as ambulatory aid     Wears dentures     Wears glasses     Wears glasses     Wheelchair dependent        Past Surgical History:   Procedure Laterality Date    BACK SURGERY      BRAIN SURGERY      CARDIAC CATHETERIZATION      with stents    CARDIAC CATHETERIZATION N/A 8/18/2023    Procedure: Cardiac Coronary Angiogram;  Surgeon: Horacio Weiner MD;  Location: [facility];  Service: Cardiology    CEREBRAL ANEURYSM REPAIR      with coils    COLONOSCOPY      ESOPHAGOGASTRODUODENOSCOPY N/A 7/1/2016    Procedure: ESOPHAGOGASTRODUODENOSCOPY (EGD);  Surgeon: Reno Christiansen MD;  Location: [facility];  Service:     GASTRIC BYPASS  11/19/2018    HERNIA REPAIR      HIATAL HERNIA REPAIR      INFUSION PUMP IMPLANTATION Left     morphine    INTRATHECAL PUMP IMPLANTATION Left 7/9/2020    Procedure: REVISION INTRATHECAL PAIN PUMP POCKET, LEFT ABDOMEN;  Surgeon: Homero Cho MD;  Location: [facility];  Service: Neurosurgery    KNEE ARTHROSCOPY Right     KNEE ARTHROSCOPY Right     PERONEAL NERVE DECOMPRESSION Right     WI DEBRIDEMENT OPEN WOUND FIRST 20 SQ CM/< N/A 6/6/2019    Procedure: EXCISIONAL DEBRIDEMENT OF SACRAL DECUBITUS ULCER;  Surgeon: Siddhartha Omer MD;  Location: [facility];  Service: General    WI ESOPHAGOGASTRODUODENOSCOPY TRANSORAL DIAGNOSTIC N/A 2/27/2017    Procedure: ESOPHAGOGASTRODUODENOSCOPY (EGD);  Surgeon: Reno Christiansen MD;  Location: [facility];  Service: Gastroenterology    WI ESOPHAGOGASTRODUODENOSCOPY TRANSORAL DIAGNOSTIC N/A 8/23/2018    Procedure: ESOPHAGOGASTRODUODENOSCOPY (EGD) with biopsy;  Surgeon: Reno Christiansen MD;  Location: [facility];  Service: Gastroenterology    NY IMPLTJ/RPLCMT ITHCL/EDRL DRUG NFS PRGRBL PUMP Left 10/13/2020    Procedure: EXPLORATION OF INTRATHECAL PAIN PUMP SYSTEM INTEGRITY FOR POSSIBLE REPLACEMENT OF CATHETER AND PUMP.;  Surgeon: Homero Cho MD;  Location: [facility];  Service: Neurosurgery    NY IMPLTJ/RPLCMT ITHCL/EDRL DRUG NFS SUBQ RSVR N/A 1/19/2017    Procedure: REMOVAL / EXCHANGE INTRATHECAL PAIN PUMP;  Surgeon: Que Leonard MD;  [facility];  Service: Orthopedics    NY IMPLTJ/RPLCMT ITHCL/EDRL DRUG NFS SUBQ RSVR N/A 5/16/2016    Procedure: REPLACEMENT AND PROGRAM PUMP ;  Surgeon: Que Leonard MD;  [facility];  Service: Orthopedics    NY PRQ IMPLTJ NSTIM ELECTRODE ARRAY EPIDURAL Right 3/17/2021    Procedure: INSERTION THORACIC DORSAL COLUMN SPINAL CORD STIMULATOR PERCUTANEOUS W IMPLANTABLE PULSE GENERATOR, RIGHT;  Surgeon: Homero Cho MD;  Location[facility];  Service: Neurosurgery       Family History   Problem Relation Age of Onset    Diabetes unspecified Mother     Diabetes Mother     Heart attack Father     Heart disease Father     Hypertension Father     Stroke Father     Diabetes unspecified Brother     Depression Brother     Mental illness Brother     Diabetes unspecified Brother     Diabetes unspecified Maternal Grandmother     Diabetes unspecified Paternal Grandmother     Diabetes unspecified Paternal Uncle     ADD / ADHD Cousin     Colon cancer Neg Hx     Colon polyps Neg Hx      I have reviewed and agree with the history as documented.    E-Cigarette/Vaping    E-Cigarette Use Never User      E-Cigarette/Vaping Substances    Nicotine No     THC No     CBD No     Flavoring No     Other No     Unknown No      Social History     Tobacco Use    Smoking status: Never    Smokeless tobacco: Never   Vaping Use    Vaping status: Never Used   Substance Use Topics    Alcohol use: Never    Drug use: Never     Types: Marijuana       Review of Systems    Physical Exam  Physical Exam  Vitals  and nursing note reviewed.   Constitutional:       General: He is not in acute distress.     Appearance: He is well-developed.   HENT:      Head: Normocephalic and atraumatic.      Right Ear: External ear normal.      Left Ear: External ear normal.      Mouth/Throat:      Pharynx: No oropharyngeal exudate.   Eyes:      General: No scleral icterus.     Pupils: Pupils are equal, round, and reactive to light.   Cardiovascular:      Rate and Rhythm: Normal rate and regular rhythm.      Heart sounds: Normal heart sounds.   Pulmonary:      Effort: Pulmonary effort is normal. No respiratory distress.      Breath sounds: Normal breath sounds.   Abdominal:      General: Bowel sounds are normal.      Palpations: Abdomen is soft.      Tenderness: There is no abdominal tenderness. There is no guarding or rebound.   Musculoskeletal:         General: Normal range of motion.      Cervical back: Normal range of motion and neck supple.   Skin:     General: Skin is warm and dry.      Findings: No rash.   Neurological:      Mental Status: He is alert and oriented to person, place, and time.      Sensory: Sensory deficit (mild right sided) present.         Vital Signs  ED Triage Vitals   Temperature Pulse Respirations Blood Pressure SpO2   03/28/24 1007 03/28/24 1005 03/28/24 1005 03/28/24 1007 03/28/24 1007   98 °F (36.7 °C) 80 20 169/98 97 %      Temp Source Heart Rate Source Patient Position - Orthostatic VS BP Location FiO2 (%)   03/28/24 1007 03/28/24 1005 03/28/24 1007 03/28/24 1007 --   Temporal Monitor Sitting Left arm       Pain Score       03/28/24 1220       7           Vitals:    03/28/24 1350 03/28/24 1430 03/28/24 1534 03/28/24 1535   BP: 146/72 138/72 158/86 158/86   Pulse: 58 62  56   Patient Position - Orthostatic VS:             Visual Acuity  Visual Acuity      Flowsheet Row Most Recent Value   L Pupil Size (mm) 3   R Pupil Size (mm) 3            ED Medications  Medications   insulin lispro (HumALOG/ADMELOG) 100  units/mL subcutaneous injection 2-12 Units ( Subcutaneous Not Given 3/28/24 1249)   insulin lispro (HumALOG/ADMELOG) 100 units/mL subcutaneous injection 2-12 Units (has no administration in time range)   aspirin chewable tablet 81 mg (has no administration in time range)   atorvastatin (LIPITOR) tablet 80 mg (has no administration in time range)   baclofen tablet 10 mg (has no administration in time range)   busPIRone (BUSPAR) tablet 5 mg (5 mg Oral Given 3/28/24 1350)   clopidogrel (PLAVIX) tablet 75 mg (has no administration in time range)   docusate sodium (COLACE) capsule 100 mg (100 mg Oral Given 3/28/24 1350)   fluticasone-vilanterol 200-25 mcg/actuation 1 puff (has no administration in time range)   methocarbamol (ROBAXIN) tablet 750 mg (has no administration in time range)   naloxegol oxalate (MOVANTIK) tablet 25 mg (has no administration in time range)   pantoprazole (PROTONIX) EC tablet 40 mg (has no administration in time range)   ranolazine (RANEXA) 12 hr tablet 1,000 mg (1,000 mg Oral Given 3/28/24 1414)   tamsulosin (FLOMAX) capsule 0.4 mg (has no administration in time range)   acetaminophen (TYLENOL) tablet 650 mg (has no administration in time range)   polyethylene glycol (MIRALAX) packet 17 g (has no administration in time range)   ondansetron (ZOFRAN) injection 4 mg (has no administration in time range)   aluminum-magnesium hydroxide-simethicone (MAALOX) oral suspension 30 mL (has no administration in time range)   enoxaparin (LOVENOX) subcutaneous injection 40 mg (has no administration in time range)   traZODone (DESYREL) tablet 100 mg (has no administration in time range)   gabapentin (NEURONTIN) capsule 800 mg (800 mg Oral Given 3/28/24 1415)   aspirin chewable tablet 324 mg (324 mg Oral Given 3/28/24 1128)   clopidogrel (PLAVIX) tablet 300 mg (300 mg Oral Given 3/28/24 1128)       Diagnostic Studies  Results Reviewed       Procedure Component Value Units Date/Time    HS Troponin I 4hr  [[id number]]  (Normal) Collected: 03/28/24 1532    Lab Status: Final result Specimen: Blood from Arm, Left Updated: 03/28/24 1604     hs TnI 4hr 5 ng/L      Delta 4hr hsTnI -1 ng/L     Hemoglobin A1C w/ EAG Estimation [[id number]]  (Abnormal) Collected: 03/28/24 1034    Lab Status: Final result Specimen: Blood from Arm, Right Updated: 03/28/24 1538     Hemoglobin A1C 6.2 %       mg/dl     HS Troponin I 2hr [[id number]]  (Normal) Collected: 03/28/24 1345    Lab Status: Final result Specimen: Blood from Arm, Right Updated: 03/28/24 1413     hs TnI 2hr 6 ng/L      Delta 2hr hsTnI 0 ng/L     TSH, 3rd generation with Free T4 reflex [[id number]]  (Normal) Collected: 03/28/24 1034    Lab Status: Final result Specimen: Blood from Arm, Right Updated: 03/28/24 1227     TSH 3RD GENERATON 3.127 uIU/mL     Protime-INR [[id number]]  (Abnormal) Collected: 03/28/24 1034    Lab Status: Final result Specimen: Blood from Arm, Right Updated: 03/28/24 1105     Protime 14.8 seconds      INR 1.11    APTT [[id number]]  (Normal) Collected: 03/28/24 1034    Lab Status: Final result Specimen: Blood from Arm, Right Updated: 03/28/24 1105     PTT 26 seconds     HS Troponin 0hr (reflex protocol) [[id number]]  (Normal) Collected: 03/28/24 1034    Lab Status: Final result Specimen: Blood from Arm, Right Updated: 03/28/24 1103     hs TnI 0hr 6 ng/L     Basic metabolic panel [[id number]] Collected: 03/28/24 1034    Lab Status: Final result Specimen: Blood from Arm, Right Updated: 03/28/24 1054     Sodium 140 mmol/L      Potassium 3.9 mmol/L      Chloride 105 mmol/L      CO2 29 mmol/L      ANION GAP 6 mmol/L      BUN 15 mg/dL      Creatinine 1.06 mg/dL      Glucose 109 mg/dL      Calcium 8.8 mg/dL      eGFR 75 ml/min/1.73sq m     Narrative:      National Kid[employer] Foundation guidelines for Chronic Kidney Disease (CKD):     Stage 1 with normal or high GFR (GFR > 90 mL/min/1.73 square meters)    Stage 2 Mild CKD (GFR = 60-89 mL/min/1.73 square  meters)    Stage 3A Moderate CKD (GFR = 45-59 mL/min/1.73 square meters)    Stage 3B Moderate CKD (GFR = 30-44 mL/min/1.73 square meters)    Stage 4 Severe CKD (GFR = 15-29 mL/min/1.73 square meters)    Stage 5 End Stage CKD (GFR <15 mL/min/1.73 square meters)  Note: GFR calculation is accurate only with a steady state creatinine    CBC and Platelet [[id number]]  (Abnormal) Collected: 03/28/24 1034    Lab Status: Final result Specimen: Blood from Arm, Right Updated: 03/28/24 1040     WBC 12.59 Thousand/uL      RBC 4.77 Million/uL      Hemoglobin 14.2 g/dL      Hematocrit 44.8 %      MCV 94 fL      MCH 29.8 pg      MCHC 31.7 g/dL      RDW 12.9 %      Platelets 254 Thousands/uL      MPV 10.1 fL     Fingerstick Glucose (POCT) [[id number]]  (Normal) Collected: 03/28/24 1007    Lab Status: Final result Specimen: Blood Updated: 03/28/24 1008     POC Glucose 123 mg/dl                    CT stroke alert brain   Final Result by Jose Watts MD (03/28 1119)      -Within the limitations of streak artifact from anterior communicating artery aneurysm coiling no vascular territory infarct or intracranial hemorrhage.   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      I personally communicated the findings via telephone with Cruz Curry  at 11:05 a.m. on 3/28/2024.         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 Possible enhancement along the medial margin of the coil mass which may represent volume averaging from A2 segment versus residual aneurysm, unchanged. MRA with and without    contrast can be obtained for further evaluation.   -Bilateral A1 segments, proximal A2 segments of the ACAs, right ICA terminus is not well evaluated due to streak artifact.   -Moderate to severe right and moderate left stenosis of the proximal intradural  vertebral arteries due to atherosclerosis.      CTA neck:   -Occlusion of the left vertebral artery from its origin to the mid V2 segment. There is distal reconstitution of the hypoplastic vertebral artery.   -Otherwise no high-grade stenosis, dissection or aneurysm involving the carotid or right vertebral arteries      I personally communicated the findings via telephone with Cruz Curry  at 11:05 a.m. on 3/28/2024.                           Workstation performed: [id number]         MRI inpatient order    (Results Pending)   XR follow up    (Results Pending)              Procedures  ECG 12 Lead Documentation Only    Date/Time: 3/28/2024 10:22 AM    Performed by: Sy Cruz DO  Authorized by: Sy Cruz DO    Indications / Diagnosis:  R sided weakness  ECG reviewed by me, the ED Provider: yes    Patient location:  ED  Previous ECG:     Previous ECG:  Compared to current    Comparison ECG info:  8/26/23    Similarity:  No change  Interpretation:     Interpretation: abnormal    Rate:     ECG rate:  62    ECG rate assessment: normal    Rhythm:     Rhythm: sinus rhythm    Ectopy:     Ectopy: none    QRS:     QRS axis:  Left    QRS intervals:  Normal  Conduction:     Conduction: abnormal      Abnormal conduction: bifascicular block    ST segments:     ST segments:  Normal  T waves:     T waves: normal             ED Course  ED Course as of 03/28/24 1613   u Mar 28, 2024   1018 Stroke alert called, neuro team at bedside, discussed HPI, PMH, and ED course   1115 Neurology recommends aspirin and Plavix load and admission along stroke pathway   1135 I discussed the case with the hospitalist. We reviewed the HPI, pertinent PMH, ED course and workup. Hospitalist agreed with plan and will admit the patient to the hospital.                                             Medical Decision Making  Stroke alert called and discussed with neurology team patient with NIH stroke score of 1.  Plan for admission on stroke  pathway for further eval and testing    Amount and/or Complexity of Data Reviewed  Labs: ordered.  Radiology: ordered.    Risk  OTC drugs.  Prescription drug management.  Decision regarding hospitalization.             Disposition  Final diagnoses:   Sensory deficit, right   Stroke (cerebrum) ([facility])   Chronic kidney disease   Obesity     Time reflects when diagnosis was documented in both MDM as applicable and the Disposition within this note       Time User Action Codes Description Comment    3/28/2024 10:17 AM Sy Cruz Add [R44.9] Sensory deficit, right     3/28/2024 11:36 AM Sy Cruz Add [I63.9] Stroke (cerebrum) ([facility])     3/28/2024 11:36 AM Sy Cruz Add [N18.9] Chronic kidney disease     3/28/2024 11:36 AM Sy Cruz Add [E66.9] Obesity     3/28/2024 12:25 PM Tiffany Ontiveros Add [R29.90] Stroke-like symptoms     3/28/2024  3:13 PM Tiffany Ontiveros Add [F11.20] Opioid dependence, continuous ([facility])           ED Disposition       ED Disposition   Admit    Condition   Stable    Date/Time   Thu Mar 28, 2024 11:36 AM    Comment   Case was discussed with Mallorie and the patient's admission status was agreed to be Admission Status: inpatient status to the service of Dr. Nobles .               Follow-up Information    None         Current Discharge Medication List        CONTINUE these medications which have NOT CHANGED    Details   albuterol (2.5 mg/3 mL) 0.083 % nebulizer solution Take 3 mL (2.5 mg total) by nebulization every 6 (six) hours as needed for wheezing or shortness of breath  Qty: 720 mL, Refills: 0    Associated Diagnoses: Chronic obstructive pulmonary disease, unspecified COPD type ([facility])      amLODIPine (NORVASC) 5 mg tablet TAKE 1 TABLET (5 MG TOTAL) BY MOUTH DAILY.  Qty: 90 tablet, Refills: 0    Comments: Needs appt.  Associated Diagnoses: STEMI (ST elevation myocardial infarction) ([facility])      ammonium lactate (LAC-HYDRIN) 12 % cream Apply topically as needed for dry skin  Qty: 385  g, Refills: 2    Associated Diagnoses: Type 2 diabetes mellitus with diabetic neuropathy, with long-term current use of insulin ([facility]); Xerosis of skin      aspirin 81 mg chewable tablet Chew 1 tablet (81 mg total) daily  Qty: 90 tablet, Refills: 1    Associated Diagnoses: STEMI (ST elevation myocardial infarction) ([facility])      atorvastatin (LIPITOR) 80 mg tablet TAKE 1 TABLET (80 MG TOTAL) BY MOUTH DAILY AFTER DINNER  Qty: 90 tablet, Refills: 0    Comments: Needs appt.  Associated Diagnoses: STEMI (ST elevation myocardial infarction) ([facility])      baclofen 10 mg tablet Take 10 mg by mouth 3 (three) times a day      busPIRone (BUSPAR) 5 mg tablet Take 5 mg by mouth 2 (two) times a day      CALCIUM PO Take 1 tablet by mouth daily      carvedilol (COREG) 12.5 mg tablet TAKE 1 TABLET BY MOUTH TWICE A DAY WITH FOOD  Qty: 60 tablet, Refills: 3    Associated Diagnoses: STEMI (ST elevation myocardial infarction) ([facility])      clopidogrel (PLAVIX) 75 mg tablet TAKE 1 TABLET BY MOUTH EVERY DAY  Qty: 90 tablet, Refills: 0    Associated Diagnoses: Coronary artery disease involving native coronary artery of native heart without angina pectoris      !! Cyanocobalamin (VITAMIN B-12 PO) Take 1 tablet by mouth daily      docusate sodium (COLACE) 100 mg capsule Take 1 capsule (100 mg total) by mouth 2 (two) times a day  Qty: 180 capsule, Refills: 3    Associated Diagnoses: Constipation due to opioid therapy      Empagliflozin (JARDIANCE) 10 MG TABS tablet Take 1 tablet (10 mg total) by mouth every morning  Qty: 30 tablet, Refills: 11    Associated Diagnoses: Chronic diastolic congestive heart failure ([facility])      Fluticasone-Salmeterol (Advair Diskus) 500-50 mcg/dose inhaler Inhale 1 puff 2 (two) times a day Rinse mouth after use  Qty: 60 blister, Refills: 3    Comments: Substitution to a formulary equivalent within the same pharmaceutical class is authorized.  Associated Diagnoses: Chronic obstructive pulmonary disease, unspecified COPD type  ([facility])      folic acid (FOLVITE) 1 mg tablet Take 1 tablet (1 mg total) by mouth daily  Qty: 90 tablet, Refills: 1    Associated Diagnoses: Neuropathy      furosemide (LASIX) 40 mg tablet Take once in AM daily. Take once daily in PM PRN weight gain, edema.  Qty: 180 tablet, Refills: 3    Comments: DX CODE: I50.32  Associated Diagnoses: Chronic diastolic congestive heart failure ([facility]); Coronary artery disease involving native coronary artery of native heart without angina pectoris      gabapentin (NEURONTIN) 300 mg capsule Take 1 capsule by mouth every eight hours up to three doses as needed for migraine headache.  Qty: 90 capsule, Refills: 0    Associated Diagnoses: Chronic migraine without aura without status migrainosus, not intractable      gabapentin (NEURONTIN) 800 mg tablet Take 800 mg by mouth 4 (four) times a day      hydrocortisone 1 % lotion Apply topically if needed for rash  Qty: 59 mL, Refills: 1    Associated Diagnoses: Rash      Klor-Con M20 20 MEQ tablet TAKE 1 TABLET BY MOUTH EVERY DAY  Qty: 90 tablet, Refills: 0    Associated Diagnoses: Coronary artery disease involving native coronary artery of native heart without angina pectoris; Chronic diastolic congestive heart failure ([facility])      lidocaine (LIDODERM) 5 % Apply 1 patch topically daily back      losartan (COZAAR) 50 mg tablet TAKE 1 TABLET BY MOUTH EVERY DAY  Qty: 90 tablet, Refills: 1    Associated Diagnoses: Coronary artery disease involving native coronary artery of native heart without angina pectoris; Chronic diastolic congestive heart failure ([facility])      methocarbamol (ROBAXIN) 750 mg tablet TAKE 1 TABLET (750 MG TOTAL) BY MOUTH EVERY 6 (SIX) HOURS AS NEEDED FOR MUSCLE SPASMS  Qty: 120 tablet, Refills: 0    Associated Diagnoses: Radiculopathy, lumbosacral region      naloxegol oxalate (MOVANTIK) 25 MG tablet Take 1 tablet (25 mg total) by mouth daily in the early morning  Qty: 30 tablet, Refills: 5    Associated Diagnoses: Constipation due  to opioid therapy      nitroglycerin (Nitrostat) 0.4 mg SL tablet Place 1 tablet (0.4 mg total) under the tongue every 5 (five) minutes as needed for chest pain (pt has not  used recently)  Qty: 30 tablet, Refills: 0    Associated Diagnoses: Coronary artery disease involving native coronary artery of native heart without angina pectoris      nystatin (MYCOSTATIN) cream Apply 2 g (1 Application total) topically 2 (two) times a day  Qty: 15 g, Refills: 0    Associated Diagnoses: Rash      omeprazole (PriLOSEC) 40 MG capsule Take 1 capsule (40 mg total) by mouth 2 (two) times a day before meals  Qty: 60 capsule, Refills: 5    Associated Diagnoses: Gastroesophageal reflux disease without esophagitis      ondansetron (ZOFRAN) 4 mg tablet Take 1 tablet (4 mg total) by mouth every 8 (eight) hours as needed for nausea or vomiting  Qty: 60 tablet, Refills: 3    Associated Diagnoses: Nausea      predniSONE 10 mg tablet Take 2 a day for 1 week then 1 a day for 14 days  Qty: 28 tablet, Refills: 0    Associated Diagnoses: Chronic obstructive pulmonary disease, unspecified COPD type ([facility])      ranolazine (RANEXA) 1000 MG SR tablet TAKE 1 TABLET (1,000 MG TOTAL) BY MOUTH EVERY 12 HOURS  Qty: 60 tablet, Refills: 3    Associated Diagnoses: STEMI (ST elevation myocardial infarction) ([facility])      tamsulosin (FLOMAX) 0.4 mg TAKE 1 CAPSULE BY MOUTH EVERY DAY WITH DINNER  Qty: 90 capsule, Refills: 1    Associated Diagnoses: Benign prostatic hyperplasia (BPH) with straining on urination      traZODone (DESYREL) 50 mg tablet TAKE 1 TABLET BY MOUTH TWICE A DAY  Qty: 90 tablet, Refills: 1    Associated Diagnoses: Psychophysiological insomnia      !! vitamin B-12 (VITAMIN B-12) 1,000 mcg tablet Take 1 tablet (1,000 mcg total) by mouth daily  Qty: 90 tablet, Refills: 3    Associated Diagnoses: Neuropathy       !! - Potential duplicate medications found. Please discuss with provider.          No discharge procedures on file.    PDMP Review          Value Time User    PDMP Reviewed  Yes 8/23/2023  6:08 PM Juan Jose Grace MD            ED Provider  Electronically Signed by             Sy Cruz DO  03/28/24 2028

## 2024-03-28 NOTE — ASSESSMENT & PLAN NOTE
Hospitalized back in October for myocarditis   Follows with [facility] cardiology  Continue to follow-up

## 2024-03-28 NOTE — ASSESSMENT & PLAN NOTE
Follows pain mgx and on morphine pump with robaxin prn  Also has spinal stimulator   PMDP reviewed no red flags

## 2024-03-28 NOTE — PLAN OF CARE
Problem: NEUROSENSORY - ADULT  Goal: Achieves stable or improved neurological status  Description: INTERVENTIONS  - Monitor and report changes in neurological status  - Monitor vital signs such as temperature, blood pressure, glucose, and any other labs ordered   - Initiate measures to prevent increased intracranial pressure  - Monitor for seizure activity and implement precautions if appropriate      Outcome: Progressing  Goal: Achieves maximal functionality and self care  Description: INTERVENTIONS  - Monitor swallowing and airway patency with patient fatigue and changes in neurological status  - Encourage and assist patient to increase activity and self care.   - Encourage visually impaired, hearing impaired and aphasic patients to use assistive/communication devices  Outcome: Progressing     Problem: Knowledge Deficit  Goal: Patient/family/caregiver demonstrates understanding of disease process, treatment plan, medications, and discharge instructions  Description: Complete learning assessment and assess knowledge base.  Interventions:  - Provide teaching at level of understanding  - Provide teaching via preferred learning methods  Outcome: Progressing

## 2024-03-28 NOTE — ASSESSMENT & PLAN NOTE
Hospitalized back in October for myocarditis   has been following with [facility] cardiology  Continue to follow-up

## 2024-03-28 NOTE — ASSESSMENT & PLAN NOTE
Lab Results   Component Value Date    EGFR 72 03/29/2024    EGFR 75 03/28/2024    EGFR 66 01/05/2024    CREATININE 1.10 03/29/2024    CREATININE 1.06 03/28/2024    CREATININE 1.17 01/05/2024     Cr on admission 1.06  Baseline appears to be 0.9-1.1  I's and O's  Avoid nephrotoxic agents hypotension and contrast  Continue to trend

## 2024-03-28 NOTE — ASSESSMENT & PLAN NOTE
Wt Readings from Last 3 Encounters:   03/29/24 (!) 148 kg (326 lb 4.5 oz)   03/28/24 (!) 139 kg (306 lb)   03/26/24 (!) 138 kg (304 lb)     NOT in exacerbation however does examine volume overloaded however according to outpt notes pt does have edema at baseline  Troponins negative  Baseline weight appears to be around 290-295 according to cardiology's outpatient note  Echo done on 8/24/2023 showed an LVEF of 60% with normal systolic function  I's and O's  Daily weights  Diabetic low-salt diet  Holding Lasix Norvasc Coreg and Cozaar for permissive hypertension  May require IV Lasix afterwards due to weight gain once stroke has been ruled out

## 2024-03-28 NOTE — ASSESSMENT & PLAN NOTE
Lab Results   Component Value Date    EGFR 75 03/28/2024    EGFR 66 01/05/2024    EGFR 87 08/31/2023    CREATININE 1.06 03/28/2024    CREATININE 1.17 01/05/2024    CREATININE 0.94 08/31/2023     Cr on admission 1.06  Baseline appears to be 0.9-1.1  I's and O's  Avoid nephrotoxic agents hypotension and contrast  Continue to trend

## 2024-03-28 NOTE — ASSESSMENT & PLAN NOTE
Wt Readings from Last 3 Encounters:   03/28/24 (!) 145 kg (318 lb 12.6 oz)   03/28/24 (!) 139 kg (306 lb)   03/26/24 (!) 138 kg (304 lb)     NOT in exacerbation however does examine volume overloaded however according to outpt notes pt does have edema at baseline  Troponins negative  Baseline weight appears to be around 290-295 according to cardiology's outpatient note  Echo done on 8/24/2023 showed an LVEF of 60% with normal systolic function  I's and O's  Daily weights  Diabetic low-salt diet  Holding Lasix Norvasc Coreg and Cozaar for permissive hypertension  May require IV Lasix afterwards due to weight gain once stroke has been ruled out

## 2024-03-28 NOTE — ASSESSMENT & PLAN NOTE
Patient presenting today due to right facial droop.  Also had slurring of words which has improved   Stroke alert called in the ED:  Troponins negative  CTH: -Within the limitations of streak artifact from anterior communicating artery aneurysm coiling no vascular territory infarct or intracranial hemorrhage. 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 Possible enhancement along the medial margin of the coil mass which may represent volume averaging from A2 segment versus residual aneurysm, unchanged. 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 There is distal reconstitution of the hypoplastic vertebral artery. -Otherwise no high-grade stenosis, dissection or aneurysm involving the carotid or right vertebral arteries  A1c 6.2  TSH WNL  Lipid panel -elevated triglycerides, total cholesterol, and LDL within normal limits  Allow for permissive HTN. Currently holding BP medications with acceptable BP. Continue to hold and restart as able     Neuro consulted, appreciate recs - see below   F/u MRI brain  F/u Echo  DAPT loaded  Continue aspirin 81mg daily, and plavix 75mg daily   Continue home lipitor  Neuro checks  PT/OT/ST

## 2024-03-28 NOTE — H&P
[facility]  H&P  Name: Aristeo Hall 63 y.o. male I MRN: [id number]  Unit/Bed#: PAPO I Date of Admission: 3/28/2024   Date of Service: 3/28/2024 I Hospital Day: 0      Assessment/Plan   * Stroke-like symptoms  Assessment & Plan  Patient presenting today due to right facial droop.  Stated that it started with slurring of words last night however that has improved    Stroke alert called in the ED:  Troponins negative  CTH: -Within the limitations of streak artifact from anterior communicating artery aneurysm coiling no vascular territory infarct or intracranial hemorrhage. 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 Possible enhancement along the medial margin of the coil mass which may represent volume averaging from A2 segment versus residual aneurysm, unchanged. 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 There is distal reconstitution of the hypoplastic vertebral artery. -Otherwise no high-grade stenosis, dissection or aneurysm involving the carotid or right vertebral arteries    Discussed with neuro and neuro following  MRI brain  Echo  DAPT loaded-> continue DAPT maintenance dose thereafter  Continue home lipitor  Neuro checks  A1c  TSH  Lipid panel  Allow for permissive HTN  PT/OT/ST      Type 2 diabetes mellitus with diabetic neuropathy, without long-term current use of insulin (HCC)  Assessment & Plan  Lab Results   Component Value Date    HGBA1C 6.2 01/09/2024       Recent Labs     03/28/24  1007   POCGLU 123       Blood  Sugar Average: Last 72 hrs:  (P) 123    Get updated A1c  ISS  Hypoglycemic protocol  Hold oral meds        Myocarditis (HCC)  Assessment & Plan  Hospitalized back in October for myocarditis   has been following with [facility] cardiology  Continue to follow-up    COPD (chronic obstructive pulmonary disease) ([facility])  Assessment & Plan  Not in exacerbation  Continue home inhaler of Advair    Hyperlipidemia  Assessment & Plan  Continue statin therapy  Lipid panel ordered    Opioid dependence, continuous (HCC)  Assessment & Plan  Follows pain mgx and on morphine pump with robaxin prn  Also has spinal stimulator   PMDP reviewed no red flags    Stage 2 chronic kidney disease  Assessment & Plan  Lab Results   Component Value Date    EGFR 75 03/28/2024    EGFR 66 01/05/2024    EGFR 87 08/31/2023    CREATININE 1.06 03/28/2024    CREATININE 1.17 01/05/2024    CREATININE 0.94 08/31/2023     Cr on admission 1.06  Baseline appears to be 0.9-1.1  I's and O's  Avoid nephrotoxic agents hypotension and contrast  Continue to trend    Morbid obesity (HCC)  Assessment & Plan  Lifestyle modifications    Chronic diastolic congestive heart failure (HCC)  Assessment & Plan  Wt Readings from Last 3 Encounters:   03/28/24 (!) 145 kg (318 lb 12.6 oz)   03/28/24 (!) 139 kg (306 lb)   03/26/24 (!) 138 kg (304 lb)     NOT in exacerbation however does examine volume overloaded however according to outpt notes pt does have edema at baseline  Troponins negative  Baseline weight appears to be around 290-295 according to cardiology's outpatient note  Echo done on 8/24/2023 showed an LVEF of 60% with normal systolic function  I's and O's  Daily weights  Diabetic low-salt diet  Holding Lasix Norvasc Coreg and Cozaar for permissive hypertension  May require IV Lasix afterwards due to weight gain once stroke has been ruled out               VTE Pharmacologic Prophylaxis: VTE Score: 9 High Risk (Score >/= 5) - Pharmacological DVT Prophylaxis Ordered:  enoxaparin (Lovenox). Sequential Compression Devices Ordered.  Code Status: Prior full code  Discussion with family: Updated  (wife) via phone.    Anticipated Length of Stay: Patient will be admitted on an observation basis with an anticipated length of stay of less than 2 midnights secondary to stroke like sx.    Total Time Spent on Date of Encounter in care of patient: 78 mins. This time was spent on one or more of the following: performing physical exam; counseling and coordination of care; obtaining or reviewing history; documenting in the medical record; reviewing/ordering tests, medications or procedures; communicating with other healthcare professionals and discussing with patient's family/caregivers.    Chief Complaint: slurring speech and right sided facial droop    History of Present Illness:  Aristeo Hall is a 63 y.o. male with a PMH of prior CVA, diabetes, COPD, CHF, CKD stage II, morbid obesity, opioid dependence who presents with slurring of words and right facial droop that started late last evening.  States that it has slightly improved.  States that he was using his phone however his phone could not recognize what he was saying.  He also endorses right sided weakness and numbness that has also been improving.  Patient has visual field defects consistent with previous stroke.  Neuro was consulted recommending admission under stroke pathway.  Will obtain A1c lipid panel and TSH.  Will obtain MRI and echo and placed on telemetry.  Will continue home regimen of Lipitor 80 mg and continue with dual antiplatelet therapy after loaded today.    Review of Systems:  Review of Systems   All other systems reviewed and are negative.      Past Medical and Surgical History:   Past Medical History:   Diagnosis Date    Acute on chronic diastolic congestive heart failure (HCC)     Altered gait     Alzheimer disease (HCC)     per patients,,early onset     Angina pectoris (HCC)     Anxiety      Arthritis     Brain aneurysm     coils placed    Cardiac disease     Chest pain 01/13/2016    Chronic kidney disease     Chronic pain     back/ right groin and rle- has morphine pump    Constipation     COPD (chronic obstructive pulmonary disease) ([facility])     Coronary artery disease     CPAP (continuous positive airway pressure) dependence     Decubital ulcer     sacral decub-occured 5/2019-sees wound care/debide in OR today 6/6/2019    Dependent on walker for ambulation     w/c for long distance    Depression     Diabetes mellitus ([facility])     insulin dependent    Dizziness     occ    Dysphagia     Enlarged prostate     Esophageal stricture In file    Esophageal varices ([facility])     Fall     Fall at home 05/03/2019    GERD (gastroesophageal reflux disease)     Heart failure ([facility])     Hiatal hernia     Hx of gastric bypass 11/19/2018    Hypercholesterolemia     Hypertension     MI (myocardial infarction) ([facility])     2017- stents x2    Migraine     Morbid obesity ([facility])     gastric bypass sleeve 11/2018-wt loss 125 lb    Neuropathy     Oxygen dependent     Q HS  2LPM with CPAP and prn during day 2-3 LPM     Pressure injury of skin     Pressure injury of skin of sacral region 06/03/2019    Added automatically from request for surgery [id number]    Renal disorder     Shortness of breath     Skin abnormality     sacral wound - covered with pad    Sleep apnea     Stented coronary artery     Stroke ([facility])     vision loss b/l  2005, residual R leg weakness    Type 2 diabetes mellitus with diabetic neuropathy, with long-term current use of insulin ([facility]) 10/18/2019    Type 2 diabetes mellitus with renal complication ([facility])     insulin dependent    Urinary frequency     Use of cane as ambulatory aid     Wears dentures     Wears glasses     Wears glasses     Wheelchair dependent        Past Surgical History:   Procedure Laterality Date    BACK SURGERY      BRAIN SURGERY      CARDIAC CATHETERIZATION      with stents    CARDIAC CATHETERIZATION  N/A 8/18/2023    Procedure: Cardiac Coronary Angiogram;  Surgeon: Horacio Weiner MD;  [facility];  Service: Cardiology    CEREBRAL ANEURYSM REPAIR      with coils    COLONOSCOPY      ESOPHAGOGASTRODUODENOSCOPY N/A 7/1/2016    Procedure: ESOPHAGOGASTRODUODENOSCOPY (EGD);  Surgeon: Reno Christiansen MD;  Location: [facility];  Service:     GASTRIC BYPASS  11/19/2018    HERNIA REPAIR      HIATAL HERNIA REPAIR      INFUSION PUMP IMPLANTATION Left     morphine    INTRATHECAL PUMP IMPLANTATION Left 7/9/2020    Procedure: REVISION INTRATHECAL PAIN PUMP POCKET, LEFT ABDOMEN;  Surgeon: Homero Cho MD;  Location: [facility];  Service: Neurosurgery    KNEE ARTHROSCOPY Right     KNEE ARTHROSCOPY Right     PERONEAL NERVE DECOMPRESSION Right     GA DEBRIDEMENT OPEN WOUND FIRST 20 SQ CM/< N/A 6/6/2019    Procedure: EXCISIONAL DEBRIDEMENT OF SACRAL DECUBITUS ULCER;  Surgeon: Siddhartha Omer MD;  [facility];  Service: General    GA ESOPHAGOGASTRODUODENOSCOPY TRANSORAL DIAGNOSTIC N/A 2/27/2017    Procedure: ESOPHAGOGASTRODUODENOSCOPY (EGD);  Surgeon: Reno Christiansen MD;  [facility]: [facility];  Service: Gastroenterology    GA ESOPHAGOGASTRODUODENOSCOPY TRANSORAL DIAGNOSTIC N/A 8/23/2018    Procedure: ESOPHAGOGASTRODUODENOSCOPY (EGD) with biopsy;  Surgeon: Reno Christiansen MD;  [facility];  Service: Gastroenterology    GA IMPLTJ/RPLCMT ITHCL/EDRL DRUG NFS PRGRBL PUMP Left 10/13/2020    Procedure: EXPLORATION OF INTRATHECAL PAIN PUMP SYSTEM INTEGRITY FOR POSSIBLE REPLACEMENT OF CATHETER AND PUMP.;  Surgeon: Homero Cho MD;  Location: [facility];  Service: Neurosurgery    GA IMPLTJ/RPLCMT ITHCL/EDRL DRUG NFS SUBQ RSVR N/A 1/19/2017    Procedure: REMOVAL / EXCHANGE INTRATHECAL PAIN PUMP;  Surgeon: Que Leonard MD;  [facility];  Service: Orthopedics    GA IMPLTJ/RPLCMT ITHCL/EDRL DRUG NFS SUBQ RSVR N/A 5/16/2016    Procedure: REPLACEMENT AND PROGRAM PUMP ;  Surgeon: Que Leonard MD;  [facility];  Service: Orthopedics    CO PRQ IMPLTJ NSTIM ELECTRODE ARRAY EPIDURAL Right 3/17/2021    Procedure: INSERTION THORACIC DORSAL COLUMN SPINAL CORD STIMULATOR PERCUTANEOUS W IMPLANTABLE PULSE GENERATOR, RIGHT;  Surgeon: Homero Cho MD;  [facility];  Service: Neurosurgery       Meds/Allergies:  Prior to Admission medications    Medication Sig Start Date End Date Taking? Authorizing Provider   albuterol (2.5 mg/3 mL) 0.083 % nebulizer solution Take 3 mL (2.5 mg total) by nebulization every 6 (six) hours as needed for wheezing or shortness of breath 3/20/24 9/16/24  Makenna Hoffman,    amLODIPine (NORVASC) 5 mg tablet TAKE 1 TABLET (5 MG TOTAL) BY MOUTH DAILY. 3/4/24   Yoni Garcia PA-C   ammonium lactate (LAC-HYDRIN) 12 % cream Apply topically as needed for dry skin 12/29/23   Mary Sanchez DPM   aspirin 81 mg chewable tablet Chew 1 tablet (81 mg total) daily 9/8/23   Yoni Garcia PA-C   atorvastatin (LIPITOR) 80 mg tablet TAKE 1 TABLET (80 MG TOTAL) BY MOUTH DAILY AFTER DINNER 3/4/24   Yoni Garcia PA-C   baclofen 10 mg tablet Take 10 mg by mouth 3 (three) times a day    Historical Provider, MD   busPIRone (BUSPAR) 5 mg tablet Take 5 mg by mouth 2 (two) times a day 1/5/24   Historical Provider, MD   CALCIUM PO Take 1 tablet by mouth daily    Historical Provider, MD   carvedilol (COREG) 12.5 mg tablet TAKE 1 TABLET BY MOUTH TWICE A DAY WITH FOOD 1/8/24   Yoni Garcia PA-C   clopidogrel (PLAVIX) 75 mg tablet TAKE 1 TABLET BY MOUTH EVERY DAY 2/26/24   Yoni Garcia PA-C   Cyanocobalamin (VITAMIN B-12 PO) Take 1 tablet by mouth daily    Historical Provider, MD   docusate sodium (COLACE) 100 mg capsule Take 1 capsule (100 mg total) by mouth 2 (two) times a day 1/31/24   Amanda Dempsey PA-C   Empagliflozin (JARDIANCE) 10 MG TABS tablet Take 1 tablet (10 mg total) by mouth every morning 3/28/24   Jones Djuro Plamenac, MD   Fluticasone-Salmeterol (Advair Diskus) 500-50 mcg/dose inhaler Inhale 1  puff 2 (two) times a day Rinse mouth after use 3/5/24   JACLYN Ospina   folic acid (FOLVITE) 1 mg tablet Take 1 tablet (1 mg total) by mouth daily 3/5/24   JACLYN Ospina   furosemide (LASIX) 40 mg tablet Take once in AM daily. Take once daily in PM PRN weight gain, edema. 3/28/24   Jones Taylor MD   gabapentin (NEURONTIN) 300 mg capsule Take 1 capsule by mouth every eight hours up to three doses as needed for migraine headache. 3/6/24   JACLYN Ospina   gabapentin (NEURONTIN) 600 MG tablet Take 1,200 mg by mouth 4 (four) times a day 1/9/20   Historical Provider, MD   hydrocortisone 1 % lotion Apply topically if needed for rash 3/5/24   JACLYN Ospina   Klor-Con M20 20 MEQ tablet TAKE 1 TABLET BY MOUTH EVERY DAY 2/23/24   Yoni Garcia PA-C   lidocaine (LIDODERM) 5 %  8/21/23   Historical Provider, MD   losartan (COZAAR) 50 mg tablet TAKE 1 TABLET BY MOUTH EVERY DAY 12/26/23   Yoni Garcia PA-C   methocarbamol (ROBAXIN) 750 mg tablet TAKE 1 TABLET (750 MG TOTAL) BY MOUTH EVERY 6 (SIX) HOURS AS NEEDED FOR MUSCLE SPASMS 1/3/24   JACLYN Ospina   naloxegol oxalate (MOVANTIK) 25 MG tablet Take 1 tablet (25 mg total) by mouth daily in the early morning 1/31/24   Amanda Dempsey PA-C   nitroglycerin (Nitrostat) 0.4 mg SL tablet Place 1 tablet (0.4 mg total) under the tongue every 5 (five) minutes as needed for chest pain (pt has not  used recently) 3/5/24   JACLYN Ospina   nystatin (MYCOSTATIN) cream Apply 2 g (1 Application total) topically 2 (two) times a day 3/5/24   JACLYN Ospina   omeprazole (PriLOSEC) 40 MG capsule Take 1 capsule (40 mg total) by mouth 2 (two) times a day before meals 9/27/23   Amanda Dempsey PA-C   ondansetron (ZOFRAN) 4 mg tablet Take 1 tablet (4 mg total) by mouth every 8 (eight) hours as needed for nausea or vomiting 9/19/23   JACLYN Ospina   predniSONE 10 mg tablet Take 2 a day for 1 week then 1 a day for 14 days 3/20/24   Makenna Hoffman DO    ranolazine (RANEXA) 1000 MG SR tablet TAKE 1 TABLET (1,000 MG TOTAL) BY MOUTH EVERY 12 HOURS 1/8/24   Yoni Garcia PA-C   tamsulosin (FLOMAX) 0.4 mg TAKE 1 CAPSULE BY MOUTH EVERY DAY WITH DINNER 3/23/24   JACLYN Ospina   traZODone (DESYREL) 50 mg tablet TAKE 1 TABLET BY MOUTH TWICE A DAY 3/9/24   JACLYN Ospina   vitamin B-12 (VITAMIN B-12) 1,000 mcg tablet Take 1 tablet (1,000 mcg total) by mouth daily 3/5/24   JACLYN Ospina   furosemide (LASIX) 40 mg tablet TAKE 1 TABLET BY MOUTH EVERY DAY 3/22/24 3/28/24  Yoni Garcia PA-C   polyethylene glycol (GLYCOLAX) 17 GM/SCOOP powder Take 17 g by mouth 2 (two) times a day 1/31/24 3/28/24  Amanda Dempsey PA-C     I have reviewed home medications using recent Epic encounter.    Allergies: No Known Allergies    Social History:  Marital Status: /Civil Union   Occupation:   Patient Pre-hospital Living Situation: Home, With spouse  Patient Pre-hospital Level of Mobility: walks  Patient Pre-hospital Diet Restrictions: none  Substance Use History:   Social History     Substance and Sexual Activity   Alcohol Use Not Currently     Social History     Tobacco Use   Smoking Status Never   Smokeless Tobacco Never     Social History     Substance and Sexual Activity   Drug Use Not Currently    Types: Marijuana       Family History:  Family History   Problem Relation Age of Onset    Diabetes unspecified Mother     Diabetes Mother     Heart attack Father     Heart disease Father     Hypertension Father     Stroke Father     Diabetes unspecified Brother     Depression Brother     Mental illness Brother     Diabetes unspecified Brother     Diabetes unspecified Maternal Grandmother     Diabetes unspecified Paternal Grandmother     Diabetes unspecified Paternal Uncle     ADD / ADHD Cousin     Colon cancer Neg Hx     Colon polyps Neg Hx        Physical Exam:     Vitals:   Blood Pressure: 152/77 (03/28/24 1145)  Pulse: 55 (03/28/24 1145)  Temperature: 98 °F (36.7  °C) (03/28/24 1007)  Temp Source: Temporal (03/28/24 1007)  Respirations: 20 (03/28/24 1145)  Height: 6' (182.9 cm) (03/28/24 1007)  Weight - Scale: (!) 145 kg (318 lb 12.6 oz) (03/28/24 1007)  SpO2: 94 % (03/28/24 1145)    Physical Exam  Vitals and nursing note reviewed.   Constitutional:       General: He is not in acute distress.     Appearance: He is not ill-appearing.   HENT:      Head: Normocephalic and atraumatic.   Cardiovascular:      Rate and Rhythm: Normal rate and regular rhythm.      Pulses: Normal pulses.      Heart sounds: Normal heart sounds.   Pulmonary:      Effort: Pulmonary effort is normal.      Breath sounds: Normal breath sounds.   Abdominal:      General: Abdomen is flat. Bowel sounds are normal.      Palpations: Abdomen is soft.   Musculoskeletal:      Right lower leg: No edema.      Left lower leg: No edema.   Skin:     General: Skin is warm.   Neurological:      General: No focal deficit present.      Mental Status: He is alert and oriented to person, place, and time.      Sensory: Sensory deficit present.      Comments: Right facial droop          Additional Data:     Lab Results:  Results from last 7 days   Lab Units 03/28/24  1034   WBC Thousand/uL 12.59*   HEMOGLOBIN g/dL 14.2   HEMATOCRIT % 44.8   PLATELETS Thousands/uL 254     Results from last 7 days   Lab Units 03/28/24  1034   SODIUM mmol/L 140   POTASSIUM mmol/L 3.9   CHLORIDE mmol/L 105   CO2 mmol/L 29   BUN mg/dL 15   CREATININE mg/dL 1.06   ANION GAP mmol/L 6   CALCIUM mg/dL 8.8   GLUCOSE RANDOM mg/dL 109     Results from last 7 days   Lab Units 03/28/24  1034   INR  1.11     Results from last 7 days   Lab Units 03/28/24  1007   POC GLUCOSE mg/dl 123               Lines/Drains:  Invasive Devices       Peripheral Intravenous Line  Duration             Peripheral IV 03/28/24 Right Antecubital <1 day                        Imaging:   CT stroke alert brain   Final Result by Jose Watts MD (03/28 1119)      -Within the limitations  of streak artifact from anterior communicating artery aneurysm coiling no vascular territory infarct or intracranial hemorrhage.   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      I personally communicated the findings via telephone with Cruz Curry  at 11:05 a.m. on 3/28/2024.         Workstation performed: [id number]         CTA stroke alert (head/neck)   Final Result by Jose Watts MD (03/28 1114)      CTA head:   -Status post coil embolization of anterior communicating artery region aneurysm. Possible enhancement along the medial margin of the coil mass which may represent volume averaging from A2 segment versus residual aneurysm, unchanged. MRA with and without    contrast can be obtained for further evaluation.   -Bilateral A1 segments, proximal A2 segments of the ACAs, right ICA terminus is not well evaluated due to streak artifact.   -Moderate to severe right and moderate left stenosis of the proximal intradural vertebral arteries due to atherosclerosis.      CTA neck:   -Occlusion of the left vertebral artery from its origin to the mid V2 segment. There is distal reconstitution of the hypoplastic vertebral artery.   -Otherwise no high-grade stenosis, dissection or aneurysm involving the carotid or right vertebral arteries      I personally communicated the findings via telephone with Cruz Curry  at 11:05 a.m. on 3/28/2024.                           Workstation performed: [id number]         MRI inpatient order    (Results Pending)       EKG and Other Studies Reviewed on Admission:   EKG: NSR. HR 70.    ** Please Note: This note has been constructed using a voice recognition system. **

## 2024-03-28 NOTE — ASSESSMENT & PLAN NOTE
63-year-old male with history of stroke in 2005 with residual visual field deficit, hx of cerebral aneurysm s/p coiling in 2003, chronic migraines, ARLEEN, CAD, COPD, chronic pain s/p spinal cord stimulator, HTN, DM2, dyslipidemia, h/o gastric bypass, CHF,     Presented on 3/28 with right facial droop and right-sided weakness/numbness, present since evening of 3/27.    Initial neuro exam with visual field deficit (inconsistent with confrontational testing but appeared to blink less to threat on right) diminished sensation R face, subtle R facial asymmetry, and RLE drift.    Neuroimaging:  -CT head demonstrated no acute changes. Chronic bilateral occipital lobe and left cerebellar infarcts.  -CTA head/neck demonstrated coil embolization of the Acomm, occlusion of the left vertebral artery.  There was streak artifact from the coiling obscuring the right ICA terminus and bilateral A1 and proximal A2 segments.  -BP on presentation 169/98.  -Patient admits he has NOT been taking his ASA/Plavix on a regular basis.    Plan:  -Stroke pathway ongoing:  -MRI brain without contrast.  -2D echocardiogram with bubble study.  -Check hemoglobin A1c, lipid panel, TSH.  -Loaded with  mg and Plavix 300 mg, and continue aspirin 81 mg and Plavix 75 mg daily.  -Continue home atorvastatin 80 mg.  -Telemetry monitoring  -PT/OT/speech therapy.  -Allow permissive hypertension for now.  -Frequent neuro checks.  -Continue to monitor and notify with changes.

## 2024-03-28 NOTE — PROGRESS NOTES
Pastoral Care Progress Note    3/28/2024  Patient: Aristeo Hall : 1960  Admission Date & Time: 3/28/2024 1002  MRN: [id number] [facility]: [id number]         24 1000   Clinical Encounter Type   Visited With Patient and family together   Routine Visit Introduction   Crisis Visit ED  (Stroke Alert)   Church Encounters   Church Needs Prayer      Intern responded to Stroke Alert as per protocol.  Intern provided a supportive presence to the pt and the pt's son. Pt explained the importance of kenny and spirituality in his life and requested prayer.  Intern prayed the [employer] Father with the pt and informed him on how to contact chaplains if needed. Spiritual care remains available.

## 2024-03-29 ENCOUNTER — APPOINTMENT (INPATIENT)
Dept: MRI IMAGING | Facility: HOSPITAL | Age: 64
DRG: 092 | End: 2024-03-29
Payer: COMMERCIAL

## 2024-03-29 ENCOUNTER — APPOINTMENT (INPATIENT)
Dept: NON INVASIVE DIAGNOSTICS | Facility: HOSPITAL | Age: 64
DRG: 092 | End: 2024-03-29
Attending: STUDENT IN AN ORGANIZED HEALTH CARE EDUCATION/TRAINING PROGRAM
Payer: COMMERCIAL

## 2024-03-29 ENCOUNTER — APPOINTMENT (INPATIENT)
Dept: RADIOLOGY | Facility: HOSPITAL | Age: 64
DRG: 092 | End: 2024-03-29
Payer: COMMERCIAL

## 2024-03-29 LAB
ANION GAP SERPL CALCULATED.3IONS-SCNC: 8 MMOL/L (ref 4–13)
AORTIC ROOT: 3.5 CM
APICAL FOUR CHAMBER EJECTION FRACTION: 56 %
ASCENDING AORTA: 3.9 CM
BSA FOR ECHO PROCEDURE: 2.62 M2
BUN SERPL-MCNC: 14 MG/DL (ref 5–25)
CALCIUM SERPL-MCNC: 8.5 MG/DL (ref 8.4–10.2)
CHLORIDE SERPL-SCNC: 104 MMOL/L (ref 96–108)
CHOLEST SERPL-MCNC: 156 MG/DL
CO2 SERPL-SCNC: 26 MMOL/L (ref 21–32)
CREAT SERPL-MCNC: 1.1 MG/DL (ref 0.6–1.3)
DOP CALC LVOT AREA: 4.15 CM2
DOP CALC LVOT DIAMETER: 2.3 CM
E WAVE DECELERATION TIME: 221 MS
E/A RATIO: 0.76
ERYTHROCYTE [DISTWIDTH] IN BLOOD BY AUTOMATED COUNT: 13.2 % (ref 11.6–15.1)
FRACTIONAL SHORTENING: 33 (ref 28–44)
GFR SERPL CREATININE-BSD FRML MDRD: 72 ML/MIN/1.73SQ M
GLUCOSE SERPL-MCNC: 124 MG/DL (ref 65–140)
GLUCOSE SERPL-MCNC: 134 MG/DL (ref 65–140)
GLUCOSE SERPL-MCNC: 180 MG/DL (ref 65–140)
GLUCOSE SERPL-MCNC: 209 MG/DL (ref 65–140)
HCT VFR BLD AUTO: 43.2 % (ref 36.5–49.3)
HDLC SERPL-MCNC: 26 MG/DL
HGB BLD-MCNC: 13.7 G/DL (ref 12–17)
INTERVENTRICULAR SEPTUM IN DIASTOLE (PARASTERNAL SHORT AXIS VIEW): 1.3 CM
INTERVENTRICULAR SEPTUM: 1.3 CM (ref 0.6–1.1)
LAAS-AP2: 14.1 CM2
LAAS-AP4: 26 CM2
LDLC SERPL CALC-MCNC: 88 MG/DL (ref 0–100)
LEFT ATRIUM SIZE: 3.6 CM
LEFT ATRIUM VOLUME (MOD BIPLANE): 67 ML
LEFT ATRIUM VOLUME INDEX (MOD BIPLANE): 25.9 ML/M2
LEFT INTERNAL DIMENSION IN SYSTOLE: 2.7 CM (ref 2.1–4)
LEFT VENTRICLE DIASTOLIC VOLUME (MOD BIPLANE): 183 ML
LEFT VENTRICLE DIASTOLIC VOLUME INDEX (MOD BIPLANE): 69.8 ML/M2
LEFT VENTRICLE SYSTOLIC VOLUME (MOD BIPLANE): 79 ML
LEFT VENTRICLE SYSTOLIC VOLUME INDEX (MOD BIPLANE): 30.2 ML/M2
LEFT VENTRICULAR INTERNAL DIMENSION IN DIASTOLE: 4 CM (ref 3.5–6)
LEFT VENTRICULAR POSTERIOR WALL IN END DIASTOLE: 1.3 CM
LEFT VENTRICULAR STROKE VOLUME: 43 ML
LV EF: 57 %
LVSV (TEICH): 43 ML
MAGNESIUM SERPL-MCNC: 2.2 MG/DL (ref 1.9–2.7)
MCH RBC QN AUTO: 30 PG (ref 26.8–34.3)
MCHC RBC AUTO-ENTMCNC: 31.7 G/DL (ref 31.4–37.4)
MCV RBC AUTO: 95 FL (ref 82–98)
MV E'TISSUE VEL-LAT: 11 CM/S
MV E'TISSUE VEL-SEP: 9 CM/S
MV PEAK A VEL: 0.92 M/S
MV PEAK E VEL: 70 CM/S
MV STENOSIS PRESSURE HALF TIME: 64 MS
MV VALVE AREA P 1/2 METHOD: 3.44
NONHDLC SERPL-MCNC: 130 MG/DL
PLATELET # BLD AUTO: 211 THOUSANDS/UL (ref 149–390)
PMV BLD AUTO: 10.5 FL (ref 8.9–12.7)
POTASSIUM SERPL-SCNC: 4 MMOL/L (ref 3.5–5.3)
RBC # BLD AUTO: 4.57 MILLION/UL (ref 3.88–5.62)
RIGHT ATRIUM AREA SYSTOLE A4C: 21.9 CM2
RIGHT VENTRICLE ID DIMENSION: 4.5 CM
SL CV LEFT ATRIUM LENGTH A2C: 4.4 CM
SL CV LV EF: 60
SL CV PED ECHO LEFT VENTRICLE DIASTOLIC VOLUME (MOD BIPLANE) 2D: 71 ML
SL CV PED ECHO LEFT VENTRICLE SYSTOLIC VOLUME (MOD BIPLANE) 2D: 28 ML
SODIUM SERPL-SCNC: 138 MMOL/L (ref 135–147)
TRICUSPID ANNULAR PLANE SYSTOLIC EXCURSION: 2.7 CM
TRIGL SERPL-MCNC: 212 MG/DL
WBC # BLD AUTO: 11.03 THOUSAND/UL (ref 4.31–10.16)

## 2024-03-29 PROCEDURE — A9585 GADOBUTROL INJECTION: HCPCS | Performed by: STUDENT IN AN ORGANIZED HEALTH CARE EDUCATION/TRAINING PROGRAM

## 2024-03-29 PROCEDURE — 99232 SBSQ HOSP IP/OBS MODERATE 35: CPT

## 2024-03-29 PROCEDURE — 97163 PT EVAL HIGH COMPLEX 45 MIN: CPT

## 2024-03-29 PROCEDURE — 93306 TTE W/DOPPLER COMPLETE: CPT

## 2024-03-29 PROCEDURE — 80061 LIPID PANEL: CPT | Performed by: STUDENT IN AN ORGANIZED HEALTH CARE EDUCATION/TRAINING PROGRAM

## 2024-03-29 PROCEDURE — 83735 ASSAY OF MAGNESIUM: CPT | Performed by: STUDENT IN AN ORGANIZED HEALTH CARE EDUCATION/TRAINING PROGRAM

## 2024-03-29 PROCEDURE — 92610 EVALUATE SWALLOWING FUNCTION: CPT

## 2024-03-29 PROCEDURE — 82948 REAGENT STRIP/BLOOD GLUCOSE: CPT

## 2024-03-29 PROCEDURE — 99233 SBSQ HOSP IP/OBS HIGH 50: CPT | Performed by: PSYCHIATRY & NEUROLOGY

## 2024-03-29 PROCEDURE — 93306 TTE W/DOPPLER COMPLETE: CPT | Performed by: INTERNAL MEDICINE

## 2024-03-29 PROCEDURE — 97166 OT EVAL MOD COMPLEX 45 MIN: CPT

## 2024-03-29 PROCEDURE — 80048 BASIC METABOLIC PNL TOTAL CA: CPT | Performed by: STUDENT IN AN ORGANIZED HEALTH CARE EDUCATION/TRAINING PROGRAM

## 2024-03-29 PROCEDURE — 70553 MRI BRAIN STEM W/O & W/DYE: CPT

## 2024-03-29 PROCEDURE — 85027 COMPLETE CBC AUTOMATED: CPT | Performed by: STUDENT IN AN ORGANIZED HEALTH CARE EDUCATION/TRAINING PROGRAM

## 2024-03-29 RX ORDER — GADOBUTROL 604.72 MG/ML
14 INJECTION INTRAVENOUS
Status: COMPLETED | OUTPATIENT
Start: 2024-03-29 | End: 2024-03-29

## 2024-03-29 RX ORDER — LORAZEPAM 2 MG/ML
0.5 INJECTION INTRAMUSCULAR ONCE
Status: COMPLETED | OUTPATIENT
Start: 2024-03-29 | End: 2024-03-29

## 2024-03-29 RX ADMIN — POLYETHYLENE GLYCOL 3350 17 G: 17 POWDER, FOR SOLUTION ORAL at 17:25

## 2024-03-29 RX ADMIN — BACLOFEN 10 MG: 10 TABLET ORAL at 17:25

## 2024-03-29 RX ADMIN — ATORVASTATIN CALCIUM 80 MG: 40 TABLET, FILM COATED ORAL at 17:26

## 2024-03-29 RX ADMIN — INSULIN LISPRO 2 UNITS: 100 INJECTION, SOLUTION INTRAVENOUS; SUBCUTANEOUS at 17:27

## 2024-03-29 RX ADMIN — ASPIRIN 81 MG CHEWABLE TABLET 81 MG: 81 TABLET CHEWABLE at 09:04

## 2024-03-29 RX ADMIN — GABAPENTIN 800 MG: 400 CAPSULE ORAL at 09:05

## 2024-03-29 RX ADMIN — BUSPIRONE HYDROCHLORIDE 5 MG: 5 TABLET ORAL at 09:04

## 2024-03-29 RX ADMIN — RANOLAZINE 1000 MG: 500 TABLET, EXTENDED RELEASE ORAL at 09:14

## 2024-03-29 RX ADMIN — FLUTICASONE FUROATE AND VILANTEROL TRIFENATATE 1 PUFF: 200; 25 POWDER RESPIRATORY (INHALATION) at 09:05

## 2024-03-29 RX ADMIN — INSULIN LISPRO 4 UNITS: 100 INJECTION, SOLUTION INTRAVENOUS; SUBCUTANEOUS at 21:44

## 2024-03-29 RX ADMIN — ENOXAPARIN SODIUM 40 MG: 40 INJECTION SUBCUTANEOUS at 17:25

## 2024-03-29 RX ADMIN — PANTOPRAZOLE SODIUM 40 MG: 40 TABLET, DELAYED RELEASE ORAL at 17:33

## 2024-03-29 RX ADMIN — GADOBUTROL 14 ML: 604.72 INJECTION INTRAVENOUS at 11:02

## 2024-03-29 RX ADMIN — BACLOFEN 10 MG: 10 TABLET ORAL at 21:43

## 2024-03-29 RX ADMIN — RANOLAZINE 1000 MG: 500 TABLET, EXTENDED RELEASE ORAL at 17:33

## 2024-03-29 RX ADMIN — GABAPENTIN 800 MG: 400 CAPSULE ORAL at 17:26

## 2024-03-29 RX ADMIN — ENOXAPARIN SODIUM 40 MG: 40 INJECTION SUBCUTANEOUS at 05:57

## 2024-03-29 RX ADMIN — GABAPENTIN 800 MG: 400 CAPSULE ORAL at 12:22

## 2024-03-29 RX ADMIN — CLOPIDOGREL BISULFATE 75 MG: 75 TABLET ORAL at 09:04

## 2024-03-29 RX ADMIN — LORAZEPAM 0.5 MG: 2 INJECTION INTRAMUSCULAR; INTRAVENOUS at 09:58

## 2024-03-29 RX ADMIN — DOCUSATE SODIUM 100 MG: 100 CAPSULE, LIQUID FILLED ORAL at 17:26

## 2024-03-29 RX ADMIN — TRAZODONE HYDROCHLORIDE 100 MG: 100 TABLET ORAL at 21:43

## 2024-03-29 RX ADMIN — BUSPIRONE HYDROCHLORIDE 5 MG: 5 TABLET ORAL at 17:26

## 2024-03-29 RX ADMIN — DOCUSATE SODIUM 100 MG: 100 CAPSULE, LIQUID FILLED ORAL at 09:04

## 2024-03-29 RX ADMIN — PANTOPRAZOLE SODIUM 40 MG: 40 TABLET, DELAYED RELEASE ORAL at 05:56

## 2024-03-29 RX ADMIN — BACLOFEN 10 MG: 10 TABLET ORAL at 09:05

## 2024-03-29 RX ADMIN — GABAPENTIN 800 MG: 400 CAPSULE ORAL at 21:43

## 2024-03-29 RX ADMIN — TAMSULOSIN HYDROCHLORIDE 0.4 MG: 0.4 CAPSULE ORAL at 17:25

## 2024-03-29 NOTE — PLAN OF CARE
Problem: PAIN - ADULT  Goal: Verbalizes/displays adequate comfort level or baseline comfort level  Description: Interventions:  - Encourage patient to monitor pain and request assistance  - Assess pain using appropriate pain scale  - Administer analgesics based on type and severity of pain and evaluate response  - Implement non-pharmacological measures as appropriate and evaluate response  - Consider cultural and social influences on pain and pain management  - Notify physician/advanced practitioner if interventions unsuccessful or patient reports new pain  Outcome: Progressing     Problem: SAFETY ADULT  Goal: Patient will remain free of falls  Description: INTERVENTIONS:  - Educate patient/family on patient safety including physical limitations  - Instruct patient to call for assistance with activity   - Consult OT/PT to assist with strengthening/mobility   - Keep Call bell within reach  - Keep bed low and locked with side rails adjusted as appropriate  - Keep care items and personal belongings within reach  - Initiate and maintain comfort rounds  - Make Fall Risk Sign visible to staff  - Offer Toileting every 2 Hours, in advance of need  - Initiate/Maintain alarm  - Obtain necessary fall risk management equipment:   - Apply yellow socks and bracelet for high fall risk patients  - Consider moving patient to room near nurses station  Outcome: Progressing  Goal: Maintain or return to baseline ADL function  Description: INTERVENTIONS:  -  Assess patient's ability to carry out ADLs; assess patient's baseline for ADL function and identify physical deficits which impact ability to perform ADLs (bathing, care of mouth/teeth, toileting, grooming, dressing, etc.)  - Assess/evaluate cause of self-care deficits   - Assess range of motion  - Assess patient's mobility; develop plan if impaired  - Assess patient's need for assistive devices and provide as appropriate  - Encourage maximum independence but intervene and  supervise when necessary  - Involve family in performance of ADLs  - Assess for home care needs following discharge   - Consider OT consult to assist with ADL evaluation and planning for discharge  - Provide patient education as appropriate  Outcome: Progressing     Problem: Knowledge Deficit  Goal: Patient/family/caregiver demonstrates understanding of disease process, treatment plan, medications, and discharge instructions  Description: Complete learning assessment and assess knowledge base.  Interventions:  - Provide teaching at level of understanding  - Provide teaching via preferred learning methods  Outcome: Progressing     Problem: NEUROSENSORY - ADULT  Goal: Achieves stable or improved neurological status  Description: INTERVENTIONS  - Monitor and report changes in neurological status  - Monitor vital signs such as temperature, blood pressure, glucose, and any other labs ordered   - Initiate measures to prevent increased intracranial pressure  - Monitor for seizure activity and implement precautions if appropriate      Outcome: Progressing  Goal: Achieves maximal functionality and self care  Description: INTERVENTIONS  - Monitor swallowing and airway patency with patient fatigue and changes in neurological status  - Encourage and assist patient to increase activity and self care.   - Encourage visually impaired, hearing impaired and aphasic patients to use assistive/communication devices  Outcome: Progressing

## 2024-03-29 NOTE — PROGRESS NOTES
[facility]  Progress Note  Name: Aristeo Hall I  MRN: [id number]  Unit/Bed#: [facility] I Date of Admission: 3/28/2024   Date of Service: 3/29/2024 I Hospital Day: 1    Assessment/Plan   * Stroke-like symptoms  Assessment & Plan  Patient presenting today due to right facial droop.  Also had slurring of words which has improved   Stroke alert called in the ED:  Troponins negative  CTH: -Within the limitations of streak artifact from anterior communicating artery aneurysm coiling no vascular territory infarct or intracranial hemorrhage. 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 Possible enhancement along the medial margin of the coil mass which may represent volume averaging from A2 segment versus residual aneurysm, unchanged. 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 There is distal reconstitution of the hypoplastic vertebral artery. -Otherwise no high-grade stenosis, dissection or aneurysm involving the carotid or right vertebral arteries  A1c 6.2  TSH WNL  Lipid panel -elevated triglycerides, total cholesterol, and LDL within normal limits  Allow for permissive HTN. Currently holding BP medications with acceptable BP. Continue to hold and restart as able     Neuro consulted, appreciate recs - see below   F/u MRI brain  F/u Echo  DAPT loaded  Continue aspirin 81mg daily, and plavix 75mg daily   Continue home lipitor  Neuro checks  PT/OT/ST      Type 2 diabetes mellitus with diabetic neuropathy, without long-term current use  of insulin ([facility])  Assessment & Plan  Lab Results   Component Value Date    HGBA1C 6.2 (H) 03/28/2024       Recent Labs     03/28/24  1007 03/28/24  1217 03/28/24  1716 03/28/24 2051   POCGLU 123 134 159* 137       Blood Sugar Average: Last 72 hrs:  (P) 138.25    HgbA1c 6.2  ISS  Hypoglycemic protocol  Hold oral meds        Myocarditis ([facility])  Assessment & Plan  Hospitalized back in October for myocarditis   Follows with [facility] cardiology  Continue to follow-up    COPD (chronic obstructive pulmonary disease) ([facility])  Assessment & Plan  Not in exacerbation  Continue home inhaler of Advair    Hyperlipidemia  Assessment & Plan  Continue statin and ranexa  Lipid panel with elevated triglycerides. Total cholesterol and LDL WNL    Opioid dependence, continuous ([facility])  Assessment & Plan  Follows pain mgx and on morphine pump with robaxin prn  Also has spinal stimulator   PMDP reviewed no red flags    Stage 2 chronic kidney disease  Assessment & Plan  Lab Results   Component Value Date    EGFR 72 03/29/2024    EGFR 75 03/28/2024    EGFR 66 01/05/2024    CREATININE 1.10 03/29/2024    CREATININE 1.06 03/28/2024    CREATININE 1.17 01/05/2024     Cr on admission 1.06  Baseline appears to be 0.9-1.1  I's and O's  Avoid nephrotoxic agents hypotension and contrast  Continue to trend    Morbid obesity ([facility])  Assessment & Plan  Lifestyle modifications    Chronic diastolic congestive heart failure ([facility])  Assessment & Plan  Wt Readings from Last 3 Encounters:   03/29/24 (!) 148 kg (326 lb 4.5 oz)   03/28/24 (!) 139 kg (306 lb)   03/26/24 (!) 138 kg (304 lb)     NOT in exacerbation however does examine volume overloaded however according to outpt notes pt does have edema at baseline  Troponins negative  Baseline weight appears to be around 290-295 according to cardiology's outpatient note  Echo done on 8/24/2023 showed an LVEF of 60% with normal systolic function  I's and O's  Daily weights  Diabetic low-salt diet  Holding Lasix  Norvasc Coreg and Cozaar for permissive hypertension  May require IV Lasix afterwards due to weight gain once stroke has been ruled out               VTE Pharmacologic Prophylaxis: VTE Score: 9 High Risk (Score >/= 5) - Pharmacological DVT Prophylaxis Ordered: enoxaparin (Lovenox). Sequential Compression Devices Ordered.    Mobility:   Basic Mobility Inpatient Raw Score: 16  JH-HLM Goal: 5: Stand one or more mins  JH-HLM Achieved: 8: Walk 250 feet ot more  JH-HLM Goal achieved. Continue to encourage appropriate mobility.    Patient Centered Rounds: I performed bedside rounds with nursing staff today.   Discussions with Specialists or Other Care Team Provider: None    Education and Discussions with Family / Patient:  Will update wife.     Total Time Spent on Date of Encounter in care of patient:  This time was spent on one or more of the following: performing physical exam; counseling and coordination of care; obtaining or reviewing history; documenting in the medical record; reviewing/ordering tests, medications or procedures; communicating with other healthcare professionals and discussing with patient's family/caregivers.    Current Length of Stay: 1 day(s)  Current Patient Status: Inpatient   Certification Statement: The patient will continue to require additional inpatient hospital stay due to pending MRI  Discharge Plan: Anticipate discharge in 24-48 hrs to home.    Code Status: Level 1 - Full Code    Subjective:   Seen and examined. No acute events overnight. States he feels fine. Endorses numbness to R upper thigh (in which he states is not baseline and that his neuropathy is only from knee down bilaterally). States his normal bowel regimen is every 8 days. Eating and drinking without difficulty. Urinating without difficulty.     Objective:     Vitals:   Temp (24hrs), Av.5 °F (36.9 °C), Min:97.3 °F (36.3 °C), Max:100 °F (37.8 °C)    Temp:  [97.3 °F (36.3 °C)-100 °F (37.8 °C)] 99 °F (37.2 °C)  HR:  [55-80]  57  Resp:  [18-20] 18  BP: (109-171)/() 130/80  SpO2:  [88 %-97 %] 94 %  Body mass index is 44.25 kg/m².     Input and Output Summary (last 24 hours):     Intake/Output Summary (Last 24 hours) at 3/29/2024 0932  Last data filed at 3/29/2024 0626  Gross per 24 hour   Intake 720 ml   Output 775 ml   Net -55 ml       Physical Exam:   Physical Exam  Constitutional:       General: He is not in acute distress.     Appearance: He is ill-appearing (chronically).   HENT:      Mouth/Throat:      Mouth: Mucous membranes are moist.   Eyes:      Conjunctiva/sclera: Conjunctivae normal.   Cardiovascular:      Rate and Rhythm: Normal rate.      Comments: Distant heart sounds    Pulmonary:      Breath sounds: No wheezing, rhonchi or rales.   Abdominal:      General: There is distension.   Musculoskeletal:      Right lower leg: Edema present.      Left lower leg: Edema present.   Skin:     General: Skin is warm and dry.   Neurological:      Mental Status: He is oriented to person, place, and time.      Motor: Weakness (R upper and lower extremity > L) present.      Comments: No facial droop appreciated on exam this morning          Additional Data:     Labs:  Results from last 7 days   Lab Units 03/29/24  0525   WBC Thousand/uL 11.03*   HEMOGLOBIN g/dL 13.7   HEMATOCRIT % 43.2   PLATELETS Thousands/uL 211     Results from last 7 days   Lab Units 03/29/24  0525   SODIUM mmol/L 138   POTASSIUM mmol/L 4.0   CHLORIDE mmol/L 104   CO2 mmol/L 26   BUN mg/dL 14   CREATININE mg/dL 1.10   ANION GAP mmol/L 8   CALCIUM mg/dL 8.5   GLUCOSE RANDOM mg/dL 124     Results from last 7 days   Lab Units 03/28/24  1034   INR  1.11     Results from last 7 days   Lab Units 03/28/24  2051 03/28/24  1716 03/28/24  1217 03/28/24  1007   POC GLUCOSE mg/dl 137 159* 134 123     Results from last 7 days   Lab Units 03/28/24  1034   HEMOGLOBIN A1C % 6.2*           Lines/Drains:  Invasive Devices       Peripheral Intravenous Line  Duration              Peripheral IV 03/28/24 Right Antecubital <1 day              Line  Duration             Pump Device Pain pump Left Abdomen -- days                      Telemetry:  Telemetry Orders (From admission, onward)               24 Hour Telemetry Monitoring  Continuous x 24 Hours (Telem)        Expiring   Question:  Reason for 24 Hour Telemetry  Answer:  TIA/Suspected CVA/ Confirmed CVA                     Telemetry Reviewed: Normal Sinus Rhythm  Indication for Continued Telemetry Use: Acute CVA r/o             Imaging: Reviewed radiology reports from this admission including: CT head    Recent Cultures (last 7 days):         Last 24 Hours Medication List:   Current Facility-Administered Medications   Medication Dose Route Frequency Provider Last Rate    acetaminophen  650 mg Oral Q6H PRN Tiffany Ontiveros MD      aluminum-magnesium hydroxide-simethicone  30 mL Oral Q6H PRN Tiffany Ontiveros MD      aspirin  81 mg Oral Daily Tiffany Ontiveros MD      atorvastatin  80 mg Oral After Dinner Tiffany Ontiveros MD      baclofen  10 mg Oral TID Tiffany Ontiveros MD      busPIRone  5 mg Oral BID Tiffany Ontiveros MD      clopidogrel  75 mg Oral Daily Tiffany Ontiveros MD      docusate sodium  100 mg Oral BID Tiffany Ontiveros MD      enoxaparin  40 mg Subcutaneous Q12H Tiffany Ontiveros MD      fluticasone-vilanterol  1 puff Inhalation Daily Tiffany Ontiveros MD      gabapentin  800 mg Oral 4x Daily Tiffany Ontiveros MD      insulin lispro  2-12 Units Subcutaneous TID AC Tiffany Ontiveros MD      insulin lispro  2-12 Units Subcutaneous HS Tiffany Ontiveros MD      methocarbamol  750 mg Oral Q6H PRN Tiffany Ontiveros MD      naloxegol oxalate  25 mg Oral Early Morning Tiffany Ontiveros MD      ondansetron  4 mg Intravenous Q4H PRN Tiffany Ontiveros MD      pantoprazole  40 mg Oral BID AC Tiffany Ontiveros MD      patient supplied medication  1.5 each Intrathecal Infusion Continuous Tiffany Ontiveros MD      polyethylene glycol  17 g Oral Daily PRN Tiffany Ontiveros MD       ranolazine  1,000 mg Oral BID Tiffany Ontiveros MD      tamsulosin  0.4 mg Oral Daily With Dinner Tiffany Ontiveros MD      traZODone  100 mg Oral HS Tiffany Ontiveros MD          Today, Patient Was Seen By: Lydia Wynn PA-C    **Please Note: This note may have been constructed using a voice recognition system.**

## 2024-03-29 NOTE — SPEECH THERAPY NOTE
Speech Language/Pathology  Speech-Language Pathology Bedside Swallow Evaluation        Patient Name: Aristeo Hall    Today's Date: 3/29/2024     Problem List  Principal Problem:    Stroke-like symptoms  Active Problems:    Chronic diastolic congestive heart failure (HCC)    Morbid obesity ([facility])    Stage 2 chronic kidney disease    Opioid dependence, continuous ([facility])    Hyperlipidemia    COPD (chronic obstructive pulmonary disease) ([facility])    Status post insertion of spinal cord stimulator    Myocarditis ([facility])    Type 2 diabetes mellitus with diabetic neuropathy, without long-term current use of insulin ([facility])         Past Medical History  Past Medical History:   Diagnosis Date    Acute on chronic diastolic congestive heart failure (HCC)     Altered gait     Alzheimer disease ([facility])     per patients,,early onset     Angina pectoris ([facility])     Anxiety     Arthritis     Brain aneurysm     coils placed    Cardiac disease     Chest pain 01/13/2016    Chronic kidney disease     Chronic pain     back/ right groin and rle- has morphine pump    Constipation     COPD (chronic obstructive pulmonary disease) ([facility])     Coronary artery disease     CPAP (continuous positive airway pressure) dependence     Decubital ulcer     sacral decub-occured 5/2019-sees wound care/debide in OR today 6/6/2019    Dependent on walker for ambulation     w/c for long distance    Depression     Diabetes mellitus (HCC)     insulin dependent    Dizziness     occ    Dysphagia     Enlarged prostate     Esophageal stricture In file    Esophageal varices ([facility])     Fall     Fall at home 05/03/2019    GERD (gastroesophageal reflux disease)     Heart failure ([facility])     Hiatal hernia     Hx of gastric bypass 11/19/2018    Hypercholesterolemia     Hypertension     MI (myocardial infarction) ([facility])     2017- stents x2    Migraine     Morbid obesity ([facility])     gastric bypass sleeve 11/2018-wt loss 125 lb    Neuropathy     Oxygen dependent     Q HS  2LPM with  CPAP and prn during day 2-3 LPM     Pressure injury of skin     Pressure injury of skin of sacral region 06/03/2019    Added automatically from request for surgery [id number]    Renal disorder     Shortness of breath     Skin abnormality     sacral wound - covered with pad    Sleep apnea     Stented coronary artery     Stroke ([facility])     vision loss b/l  2005, residual R leg weakness    Type 2 diabetes mellitus with diabetic neuropathy, with long-term current use of insulin ([facility]) 10/18/2019    Type 2 diabetes mellitus with renal complication ([facility])     insulin dependent    Urinary frequency     Use of cane as ambulatory aid     Wears dentures     Wears glasses     Wears glasses     Wheelchair dependent        Past Surgical History  Past Surgical History:   Procedure Laterality Date    BACK SURGERY      BRAIN SURGERY      CARDIAC CATHETERIZATION      with stents    CARDIAC CATHETERIZATION N/A 8/18/2023    Procedure: Cardiac Coronary Angiogram;  Surgeon: Horacio Weiner MD;  Location: [facility];  Service: Cardiology    CEREBRAL ANEURYSM REPAIR      with coils    COLONOSCOPY      ESOPHAGOGASTRODUODENOSCOPY N/A 7/1/2016    Procedure: ESOPHAGOGASTRODUODENOSCOPY (EGD);  Surgeon: Reno Christiansen MD;  Location: [facility];  Service:     GASTRIC BYPASS  11/19/2018    HERNIA REPAIR      HIATAL HERNIA REPAIR      INFUSION PUMP IMPLANTATION Left     morphine    INTRATHECAL PUMP IMPLANTATION Left 7/9/2020    Procedure: REVISION INTRATHECAL PAIN PUMP POCKET, LEFT ABDOMEN;  Surgeon: Homero Cho MD;  Location: [facility];  Service: Neurosurgery    KNEE ARTHROSCOPY Right     KNEE ARTHROSCOPY Right     PERONEAL NERVE DECOMPRESSION Right     WV DEBRIDEMENT OPEN WOUND FIRST 20 SQ CM/< N/A 6/6/2019    Procedure: EXCISIONAL DEBRIDEMENT OF SACRAL DECUBITUS ULCER;  Surgeon: Siddhartha Omer MD;  Location: [facility];  Service: General    WV ESOPHAGOGASTRODUODENOSCOPY TRANSORAL DIAGNOSTIC N/A 2/27/2017    Procedure: ESOPHAGOGASTRODUODENOSCOPY  "(EGD);  Surgeon: Reno Christiansen MD;  Location: [facility];  Service: Gastroenterology    WV ESOPHAGOGASTRODUODENOSCOPY TRANSORAL DIAGNOSTIC N/A 8/23/2018    Procedure: ESOPHAGOGASTRODUODENOSCOPY (EGD) with biopsy;  Surgeon: Reno Christiansen MD;  [facility]: [facility];  Service: Gastroenterology    WV IMPLTJ/RPLCMT ITHCL/EDRL DRUG NFS PRGRBL PUMP Left 10/13/2020    Procedure: EXPLORATION OF INTRATHECAL PAIN PUMP SYSTEM INTEGRITY FOR POSSIBLE REPLACEMENT OF CATHETER AND PUMP.;  Surgeon: Homero Cho MD;  Location: [facility];  Service: Neurosurgery    WV IMPLTJ/RPLCMT ITHCL/EDRL DRUG NFS SUBQ RSVR N/A 1/19/2017    Procedure: REMOVAL / EXCHANGE INTRATHECAL PAIN PUMP;  Surgeon: Que Leonard MD;  [facility];  Service: Orthopedics    WV IMPLTJ/RPLCMT ITHCL/EDRL DRUG NFS SUBQ RSVR N/A 5/16/2016    Procedure: REPLACEMENT AND PROGRAM PUMP ;  Surgeon: Que Leonard MD;  [facility];  Service: Orthopedics    WV PRQ IMPLTJ NSTIM ELECTRODE ARRAY EPIDURAL Right 3/17/2021    Procedure: INSERTION THORACIC DORSAL COLUMN SPINAL CORD STIMULATOR PERCUTANEOUS W IMPLANTABLE PULSE GENERATOR, RIGHT;  Surgeon: Homero Cho MD;  Location[facility];  Service: Neurosurgery       Summary:   Pt presents with oral and pharyngeal stages of the swallow that appear WFL given independent use of compensatory strategies. Pt was able to adequately retrieve liquids from cup with placement to L side. Pt reports he's \"done this for years because I'm pretty sure I had a stroke and never came to the hospital. I hate hospitals\". No anterior leakage. Pt favors mastication to L side due to R side oral motor weakness to which he again reports \"is normal\" for him. No oral residue or pocketing. Pt with adequate bolus control and transfer. Swallow initiation appeared prompt. No coughing, throat clearing, change in vocal quality, or change in respiratory functioning. Pt denies difficulty chewing/swallowing, globus sensation, recent/recurrent PNA, food " "avoidance, or unexplained weight loss. Pt denies difficulty with receptive/expressive communication. Pt reports that he is \"fine\" and that \"as long as I can talk and eat... I can live with a little facial droop\". SLP provided education on role/purse of ST and offered additional communication testing/follow-up. Pt politely declining at this time. ST signing off; re-consult PRN.     Recommendations:   Diet: thin liquids and regular  Medications: whole with liquids  Oral care: 3x/day with toothbrush and toothpaste/mouthwash  Supervision: independent  Aspiration Precautions/Compensatory Swallowing Strategies: upright position, fully awake, small bites, and small sips      Current Medical Status:  Pt is a 63 y.o. male who presented to [facility] on March 28, 2024 with slurring of words and right facial droop that started late last evening. PMH of prior CVA, diabetes, COPD, CHF, CKD stage II, morbid obesity, opioid dependence   States that it has slightly improved.  States that he was using his phone however his phone could not recognize what he was saying.  He also endorses right sided weakness and numbness that has also been improving.  Patient has visual field defects consistent with previous stroke. Neuro was consulted recommending admission under stroke pathway, therefore ST consult placed.     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 Possible enhancement along the medial margin of the coil mass which may represent volume averaging from A2 segment versus residual aneurysm, unchanged. MRA with and without contrast can be obtained for further evaluation. -Bilateral A1 segments, proximal A2 segments of the ACAs, right ICA terminus is not well evaluated due to streak artifact. -Moderate to severe right and moderate " "left stenosis of the proximal intradural vertebral arteries due to atherosclerosis. CTA neck:  -Occlusion of the left vertebral artery from its origin to the mid V2 segment. There is distal reconstitution of the hypoplastic vertebral artery. -Otherwise no high-grade stenosis, dissection or aneurysm involving the carotid or right vertebral arteries  03/28/2024 CT Stroke Alert Brain IMPRESSION: -Within the limitations of streak artifact from anterior communicating artery aneurysm coiling no vascular territory infarct or intracranial hemorrhage. 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    Social/Education/Vocational Hx:  Pt resides with family    Swallow Information:   Current Risks for Dysphagia & Aspiration: stroke like symptoms  Current Symptoms/Concerns: stroke like symptoms  Current Diet: thin liquids and regular  Baseline Diet: thin liquids and regular   Pt consumes medications: whole with liquids    Positioning and Respiratory Status:  Position: upright sitting on side of bed  Respiratory Status: room air    Oral Integrity:  Mucosa: moist and pink  Dentition:edentulous (pt reports dentures at home but he doesn't always wear them to eat; pt mostly wears them for pictures/public/when eating a steak)  Dependent on Oral Care: no    Oral Mechanism Exam:   Face: R side facial droop scant to which pt reports he's \"had for years\"  Lips: WFL  Jaw: adequate ROM  Tongue: WFL  Hard/Soft Palate:normal  Cough: volitional and strong    Speech Characteristics:  Apraxia of Speech: none  Dysarthria: none at speech is 100% intelligible; pt reports that his speech is still \"slurry\"   Vocal Quality: WFL    Textures Assessed:  Puree trials of oatmeal were given to the patient during assessment. Clinician observed the following clinical s/s of dysphagia: WFL    Soft trials of omelet with vegetables were given to the patient during assessment. Clinician observed the following clinical s/s of dysphagia: " WFL    Regular trials of toast with butter were given to the patient during assessment. Clinician observed the following clinical s/s of dysphagia: WFL    Liquids Assessed:  Thin liquids via cup/straw. Clinician observed the following clinical s/s of dysphagia:  WFL    Esophageal Concerns  hx of GERD; patient has hx EGD with a globus sensation      Strategies Trialed  Postural Techniques:none  Maneuvers:none  Behavioral Strategies: none    Results Reviewed with: patient and RN     Dysphagia Goals:   Patient will demonstrate safe and effective oral intake (without overt s/s significant oral/pharyngeal dysphagia including s/s penetration or aspiration) for the highest appropriate diet level for adequate nutrition/hydration.     Speech Therapy Prognosis   Prognosis: good    Prognosis Considerations: age, medical status, prior medical history, cognitive status, and respiratory status

## 2024-03-29 NOTE — PHYSICAL THERAPY NOTE
PHYSICAL THERAPY EVALUATION NOTE    Patient Name: Aristeo Hall  Today's Date: 3/29/2024    AGE:   63 y.o.  Mrn:   [id number]  ADMIT DX:  Obesity [E66.9]  Facial droop [R29.810]  Stroke (cerebrum) ([facility]) [I63.9]  Chronic kidney disease [N18.9]  Sensory deficit, right [R44.9]    Past Medical History:   Diagnosis Date    Acute on chronic diastolic congestive heart failure (HCC)     Altered gait     Alzheimer disease ([facility])     per patients,,early onset     Angina pectoris ([facility])     Anxiety     Arthritis     Brain aneurysm     coils placed    Cardiac disease     Chest pain 01/13/2016    Chronic kidney disease     Chronic pain     back/ right groin and rle- has morphine pump    Constipation     COPD (chronic obstructive pulmonary disease) ([facility])     Coronary artery disease     CPAP (continuous positive airway pressure) dependence     Decubital ulcer     sacral decub-occured 5/2019-sees wound care/debide in OR today 6/6/2019    Dependent on walker for ambulation     w/c for long distance    Depression     Diabetes mellitus ([facility])     insulin dependent    Dizziness     occ    Dysphagia     Enlarged prostate     Esophageal stricture In file    Esophageal varices ([facility])     Fall     Fall at home 05/03/2019    GERD (gastroesophageal reflux disease)     Heart failure ([facility])     Hiatal hernia     Hx of gastric bypass 11/19/2018    Hypercholesterolemia     Hypertension     MI (myocardial infarction) ([facility])     2017- stents x2    Migraine     Morbid obesity ([facility])     gastric bypass sleeve 11/2018-wt loss 125 lb    Neuropathy     Oxygen dependent     Q HS  2LPM with CPAP and prn during day 2-3 LPM     Pressure injury of skin     Pressure injury of skin of sacral region 06/03/2019    Added automatically from request for surgery [id number]    Renal disorder     Shortness of breath     Skin abnormality     sacral wound - covered with pad    Sleep apnea      Stented coronary artery     Stroke ([facility])     vision loss b/l  , residual R leg weakness    Type 2 diabetes mellitus with diabetic neuropathy, with long-term current use of insulin ([facility]) 10/18/2019    Type 2 diabetes mellitus with renal complication ([facility])     insulin dependent    Urinary frequency     Use of cane as ambulatory aid     Wears dentures     Wears glasses     Wears glasses     Wheelchair dependent      Length Of Stay: 1  PHYSICAL THERAPY EVALUATION :   Patient's identity confirmed via 2 patient identifiers (full name and ) at start of session       24 0933   PT Last Visit   PT Visit Date 24   Note Type   Note type Evaluation   Pain Assessment   Pain Assessment Tool FLACC   Pain Rating: FLACC (Rest) - Face 0   Pain Rating: FLACC (Rest) - Legs 0   Pain Rating: FLACC (Rest) - Activity 0   Pain Rating: FLACC (Rest) - Cry 0   Pain Rating: FLACC (Rest) - Consolability 0   Score: FLACC (Rest) 0   Restrictions/Precautions   Weight Bearing Precautions Per Order No   Other Precautions Chair Alarm;Bed Alarm;Fall Risk   Home Living   Type of Home House   Home Layout One level;Stairs to enter with rails  (5 QUE)   Home Equipment Walker  (rollator)   Additional Comments Pt reports now residing in a 1 SH w/ 5 QUE. Uses rollator at all times to ambulate   Prior Function   Level of [place] Independent with functional mobility;Needs assistance with ADLs   Lives With Spouse;Son  (2 sons)   Receives Help From Family   Falls in the last 6 months 5 to 10   Vocational On disability   General   Family/Caregiver Present No   Cognition   Overall Cognitive Status WFL   Arousal/Participation Alert   Orientation Level Oriented X4   Memory Within functional limits   Following Commands Follows multistep commands with increased time or repetition   RLE Assessment   RLE Assessment WFL   Strength RLE   RLE Overall Strength 4-/5   LLE Assessment   LLE Assessment WFL   Strength LLE   LLE Overall Strength 4-/5    Bed Mobility   Supine to Sit 6  Modified independent   Transfers   Sit to Stand 6  Modified independent   Stand to Sit 6  Modified independent   Ambulation/Elevation   Gait pattern Decreased foot clearance   Gait Assistance 6  Modified independent   Assistive Device   (Rollator)   Distance 50 ft  (including changes in direction)   Stair Management Assistance 5  Supervision   Stair Management Technique Two rails   Number of Stairs 2   Balance   Static Sitting Good   Dynamic Sitting Good   Static Standing Good   Dynamic Standing Good   Ambulatory Good   Activity Tolerance   Activity Tolerance Patient tolerated treatment well   Medical Staff Made Aware OT Sumi   Nurse Made Aware HAILEY Murphy   Assessment   Problem List Impaired balance;Decreased mobility;Obesity   Assessment Aristeo Hall is a 63 y.o. Male who [facility]28[facility] from home w/ c/o R facial droop and word slurring and diagnosis of stroke-like symptoms. Orders for PT eval and treat received. Pt presents w/ comorbidities of hx of CVA, CHF, opioid dependence, COPD, myocarditis, DMII. At baseline, pt mobilizes modified I w/ rollator, and reports + falls in the last 6 months. Upon evaluation, pt presents w/ the following deficits: impaired balance and decreased endurance. Upon eval, pt requires modified I for bed mobility, modified I for transfers, and modified I for gait. Based on this PT evaluation today, patient's discharge recommendation is for Level IV. Given the above findings from this evaluation, at this time this patient does not require skilled inpatient PT for the remainder of this admission. Will D/C patient from PT caseload, please reconsult if any changes or needs arise.   Discharge Recommendation   Rehab Resource Intensity Level, PT No post-acute rehabilitation needs   AM-PAC Basic Mobility Inpatient   Turning in Flat Bed Without Bedrails 4   Lying on Back to Sitting on Edge of Flat Bed Without Bedrails 4   Moving Bed to  Chair 4   Standing Up From Chair Using Arms 4   Walk in Room 4   Climb 3-5 Stairs With Railing 3   Basic Mobility Inpatient Raw Score 23   Basic Mobility Standardized Score 50.88   [facility] Highest Level Of Mobility   -HLM Goal 7: Walk 25 feet or more   -HLM Achieved 7: Walk 25 feet or more         The patient's AM-PAC Basic Mobility Inpatient Short Form Raw Score is 23, Standardized Score is 50.88. A standardized score greater than 38.32 (raw score of 16) suggests the patient may benefit from discharge to home which may not coincide with above PT recommendations. However please refer to therapist recommendation for discharge planning given other factors that may influence destination.      Given the above findings from this evaluation, at this time this patient does not require skilled inpatient PT for the remainder of this admission. Will D/C patient from PT caseload, please reconsult if any changes or needs arise.      Marielos Prado PT, DPT

## 2024-03-29 NOTE — PLAN OF CARE
Problem: Knowledge Deficit  Goal: Patient/family/caregiver demonstrates understanding of disease process, treatment plan, medications, and discharge instructions  Description: Complete learning assessment and assess knowledge base.  Interventions:  - Provide teaching at level of understanding  - Provide teaching via preferred learning methods  Outcome: Progressing     Problem: NEUROSENSORY - ADULT  Goal: Achieves stable or improved neurological status  Description: INTERVENTIONS  - Monitor and report changes in neurological status  - Monitor vital signs such as temperature, blood pressure, glucose, and any other labs ordered   - Initiate measures to prevent increased intracranial pressure  - Monitor for seizure activity and implement precautions if appropriate      Outcome: Progressing  Goal: Achieves maximal functionality and self care  Description: INTERVENTIONS  - Monitor swallowing and airway patency with patient fatigue and changes in neurological status  - Encourage and assist patient to increase activity and self care.   - Encourage visually impaired, hearing impaired and aphasic patients to use assistive/communication devices  Outcome: Progressing     Problem: PAIN - ADULT  Goal: Verbalizes/displays adequate comfort level or baseline comfort level  Description: Interventions:  - Encourage patient to monitor pain and request assistance  - Assess pain using appropriate pain scale  - Administer analgesics based on type and severity of pain and evaluate response  - Implement non-pharmacological measures as appropriate and evaluate response  - Consider cultural and social influences on pain and pain management  - Notify physician/advanced practitioner if interventions unsuccessful or patient reports new pain  Outcome: Progressing

## 2024-03-29 NOTE — QUICK NOTE
Spinal stim put in mri mode scan time 24:11 mins   normal mode verified in mri mode by zechariah armenta full body eligible       cc

## 2024-03-29 NOTE — CASE MANAGEMENT
Case Management Assessment & Discharge Planning Note    Patient name Aristeo Hall  Location /-01 MRN [id number]  : 1960 Date 3/29/2024       Current Admission Date: 3/28/2024  Current Admission Diagnosis:Stroke-like symptoms   Patient Active Problem List    Diagnosis Date Noted    Stroke-like symptoms 2024    ARLEEN (obstructive sleep apnea) 2024    Chronic obstructive pulmonary disease, unspecified COPD type ([facility]) 2024    Type 2 diabetes mellitus with diabetic neuropathy, without long-term current use of insulin ([facility]) 2023    Hiatal hernia 2023    Arteriosclerosis of artery of extremity ([facility]) 2023    Myocarditis ([facility]) 2023    History of gastric sleeve procedure 2023    Status post insertion of spinal cord stimulator 2021    Skin inflammation 2020    Radiculopathy, lumbosacral region 2020    Neuropathy 2020    Presence of intrathecal pump 2020    Major depressive disorder, recurrent, moderate ([facility]) 2020    COPD (chronic obstructive pulmonary disease) ([facility]) 2020    Mild cognitive impairment 10/18/2019    Primary osteoarthritis of both knees 2019    Orthostatic hypotension 2019    Bilateral foot pain 2019    Symptomatic bradycardia 2019    Vitamin D deficiency 2018    Chronic migraine without aura without status migrainosus, not intractable 2018    Esophageal dysphagia 2018    Opioid dependence, continuous ([facility]) 2017    GERD (gastroesophageal reflux disease)     Chest pain 10/12/2017    Stage 2 chronic kidney disease 10/12/2017    Chronic pain disorder     Other constipation     Sleep apnea     Stroke ([facility])     Stented coronary artery     Obstructive sleep apnea syndrome     CPAP (continuous positive airway pressure) dependence     Brain aneurysm     Morbid obesity ([facility]) 01/15/2016    History of CVA (cerebrovascular accident) 01/15/2016    Coronary  artery disease of native artery of native heart with stable angina pectoris (HCC) 01/14/2016    Chronic diastolic congestive heart failure (HCC)     Hyperlipidemia 02/09/2015      LOS (days): 1  Geometric Mean LOS (GMLOS) (days): 4  Days to GMLOS:2.8     OBJECTIVE:    Risk of Unplanned Readmission Score: 24.9         Current admission status: Inpatient       Preferred Pharmacy:   [facility] - [place] - [address]  [address]  [place] [address]  Phone: [phone] Fax: [phone]    Primary Care Provider: JACLYN Ospina    Primary Insurance: [facility] FOR [facility]  Secondary Insurance: [facility] COMMUNITY [facility]    ASSESSMENT:  Active Health Care Proxies       Reva Bethea Health Care Representative - Spouse   Primary Phone: [phone] (Mobile)  Home Phone: [phone]                 Readmission Root Cause  30 Day Readmission: No    Patient Information  Admitted from:: Home  Mental Status: Alert  During Assessment patient was accompanied by: Spouse, Son  Assessment information provided by:: Patient  Primary Caregiver: Self  Support Systems: Self, Spouse/significant other, Children  County of Residence: [place]  What city do you live in?: [place]  Home entry access options. Select all that apply.: Stairs  Number of steps to enter home.: 5  Do the steps have railings?: Yes  Type of Current Residence: [facility]  Living Arrangements: Lives w/ Spouse/significant other  Is patient a ?: No    Activities of Daily Living Prior to Admission  Functional Status: Independent  Completes ADLs independently?: Yes  Ambulates independently?: Yes (ambulates with rollator)  Does patient use assisted devices?: Yes  Assisted Devices (DME) used: Straight Cane, Nebulizer, Rollator  Does patient currently own DME?: Yes  What DME does the patient currently own?: Rollator, Straight Cane, Nebulizer  Does patient have a history of Outpatient Therapy (PT/OT)?: Yes  Does the patient have  a history of Short-Term Rehab?: No  Does patient have a history of HHC?: Yes  Does patient currently have HHC?: No    Patient Information Continued  Income Source: SSI/SSD  Does patient have prescription coverage?: Yes  Does patient receive dialysis treatments?: No  Does patient have a history of substance abuse?: No  Does patient have a history of Mental Health Diagnosis?: Yes (depression)    Means of Transportation  Means of Transport to Appts:: Drives Self      Social Determinants of Health (SDOH)      Flowsheet Row Most Recent Value   Housing Stability    In the last 12 months, was there a time when you were not able to pay the mortgage or rent on time? N   In the last 12 months, how many places have you lived? 1   In the last 12 months, was there a time when you did not have a steady place to sleep or slept in a shelter (including now)? N   Transportation Needs    In the past 12 months, has lack of transportation kept you from medical appointments or from getting medications? no   In the past 12 months, has lack of transportation kept you from meetings, work, or from getting things needed for daily living? No   Food Insecurity    Within the past 12 months, you worried that your food would run out before you got the money to buy more. Sometimes   Within the past 12 months, the food you bought just didn't last and you didn't have money to get more. Sometimes   Utilities    In the past 12 months has the electric, gas, oil, or water company threatened to shut off services in your home? No            DISCHARGE DETAILS:    Discharge planning discussed with:: patient, patient's son  Freedom of Choice: Yes  Comments - Freedom of Choice: Plans to return home upon discharge. Agreeable for community resources  CM contacted family/caregiver?: No- see comments (Pt's wife and son at bedside)    Contacts  Patient Contacts: Reva Calderon  Relationship to Patient:: Family  Contact Method: In Person  Reason/Outcome:  Emergency Contact, Discharge Planning    Additional Comments: Met with Pt. Pt's wife and son at bedside. Discussed role of case management. Pt lives [place] wife and family in [place]. Uses rollator. Owns cane and nebulizer. Uses [facility] for prescriptions. Hx of outpt PT. Hx of VNA. Denies hx of SNF. Hx of MH. Wife drives, grocery shopping. Pt agreeable for community resources for ulities assistance and food, etc. PT/OT saw Pt and no skilled needs. Pt's wife to transport home.

## 2024-03-29 NOTE — OCCUPATIONAL THERAPY NOTE
Occupational Therapy Evaluation     Patient Name: Aristeo Hall  Today's Date: 3/29/2024  Problem List  Principal Problem:    Stroke-like symptoms  Active Problems:    Chronic diastolic congestive heart failure (HCC)    Morbid obesity ([facility])    Stage 2 chronic kidney disease    Opioid dependence, continuous (HCC)    Hyperlipidemia    COPD (chronic obstructive pulmonary disease) ([facility])    Status post insertion of spinal cord stimulator    Myocarditis ([facility])    Type 2 diabetes mellitus with diabetic neuropathy, without long-term current use of insulin ([facility])    Past Medical History  Past Medical History:   Diagnosis Date    Acute on chronic diastolic congestive heart failure (HCC)     Altered gait     Alzheimer disease ([facility])     per patients,,early onset     Angina pectoris ([facility])     Anxiety     Arthritis     Brain aneurysm     coils placed    Cardiac disease     Chest pain 01/13/2016    Chronic kidney disease     Chronic pain     back/ right groin and rle- has morphine pump    Constipation     COPD (chronic obstructive pulmonary disease) ([facility])     Coronary artery disease     CPAP (continuous positive airway pressure) dependence     Decubital ulcer     sacral decub-occured 5/2019-sees wound care/debide in OR today 6/6/2019    Dependent on walker for ambulation     w/c for long distance    Depression     Diabetes mellitus (HCC)     insulin dependent    Dizziness     occ    Dysphagia     Enlarged prostate     Esophageal stricture In file    Esophageal varices ([facility])     Fall     Fall at home 05/03/2019    GERD (gastroesophageal reflux disease)     Heart failure ([facility])     Hiatal hernia     Hx of gastric bypass 11/19/2018    Hypercholesterolemia     Hypertension     MI (myocardial infarction) ([facility])     2017- stents x2    Migraine     Morbid obesity ([facility])     gastric bypass sleeve 11/2018-wt loss 125 lb    Neuropathy     Oxygen dependent     Q HS  2LPM with CPAP and prn during day 2-3 LPM     Pressure injury of  skin     Pressure injury of skin of sacral region 06/03/2019    Added automatically from request for surgery [id number]    Renal disorder     Shortness of breath     Skin abnormality     sacral wound - covered with pad    Sleep apnea     Stented coronary artery     Stroke ([facility])     vision loss b/l  2005, residual R leg weakness    Type 2 diabetes mellitus with diabetic neuropathy, with long-term current use of insulin ([facility]) 10/18/2019    Type 2 diabetes mellitus with renal complication ([facility])     insulin dependent    Urinary frequency     Use of cane as ambulatory aid     Wears dentures     Wears glasses     Wears glasses     Wheelchair dependent      Past Surgical History  Past Surgical History:   Procedure Laterality Date    BACK SURGERY      BRAIN SURGERY      CARDIAC CATHETERIZATION      with stents    CARDIAC CATHETERIZATION N/A 8/18/2023    Procedure: Cardiac Coronary Angiogram;  Surgeon: Horacio Weiner MD;  Location: [facility];  Service: Cardiology    CEREBRAL ANEURYSM REPAIR      with coils    COLONOSCOPY      ESOPHAGOGASTRODUODENOSCOPY N/A 7/1/2016    Procedure: ESOPHAGOGASTRODUODENOSCOPY (EGD);  Surgeon: Reno Christiansen MD;  Location: [facility];  Service:     GASTRIC BYPASS  11/19/2018    HERNIA REPAIR      HIATAL HERNIA REPAIR      INFUSION PUMP IMPLANTATION Left     morphine    INTRATHECAL PUMP IMPLANTATION Left 7/9/2020    Procedure: REVISION INTRATHECAL PAIN PUMP POCKET, LEFT ABDOMEN;  Surgeon: Homero Cho MD;  Location: [facility];  Service: Neurosurgery    KNEE ARTHROSCOPY Right     KNEE ARTHROSCOPY Right     PERONEAL NERVE DECOMPRESSION Right     ID DEBRIDEMENT OPEN WOUND FIRST 20 SQ CM/< N/A 6/6/2019    Procedure: EXCISIONAL DEBRIDEMENT OF SACRAL DECUBITUS ULCER;  Surgeon: Siddhartha Omer MD;  Location: [facility];  Service: General    ID ESOPHAGOGASTRODUODENOSCOPY TRANSORAL DIAGNOSTIC N/A 2/27/2017    Procedure: ESOPHAGOGASTRODUODENOSCOPY (EGD);  Surgeon: Reno Christiansen MD;  Location: [facility];   Service: Gastroenterology    OH ESOPHAGOGASTRODUODENOSCOPY TRANSORAL DIAGNOSTIC N/A 8/23/2018    Procedure: ESOPHAGOGASTRODUODENOSCOPY (EGD) with biopsy;  Surgeon: Reno Christiansen MD;  [facility]: [facility];  Service: Gastroenterology    OH IMPLTJ/RPLCMT ITHCL/EDRL DRUG NFS PRGRBL PUMP Left 10/13/2020    Procedure: EXPLORATION OF INTRATHECAL PAIN PUMP SYSTEM INTEGRITY FOR POSSIBLE REPLACEMENT OF CATHETER AND PUMP.;  Surgeon: Homero Cho MD;  Location: [facility];  Service: Neurosurgery    OH IMPLTJ/RPLCMT ITHCL/EDRL DRUG NFS SUBQ RSVR N/A 1/19/2017    Procedure: REMOVAL / EXCHANGE INTRATHECAL PAIN PUMP;  Surgeon: Que Leonard MD;  [facility];  Service: Orthopedics    OH IMPLTJ/RPLCMT ITHCL/EDRL DRUG NFS SUBQ RSVR N/A 5/16/2016    Procedure: REPLACEMENT AND PROGRAM PUMP ;  Surgeon: Que Leonard MD;  [facility];  Service: Orthopedics    OH PRQ IMPLTJ NSTIM ELECTRODE ARRAY EPIDURAL Right 3/17/2021    Procedure: INSERTION THORACIC DORSAL COLUMN SPINAL CORD STIMULATOR PERCUTANEOUS W IMPLANTABLE PULSE GENERATOR, RIGHT;  Surgeon: Homero Cho MD;  [facility];  Service: Neurosurgery         03/29/24 0931   OT Last Visit   OT Visit Date 03/29/24   Note Type   Note type Evaluation   Pain Assessment   Pain Assessment Tool FLACC   Pain Rating: FLACC (Rest) - Face 0   Pain Rating: FLACC (Rest) - Legs 0   Pain Rating: FLACC (Rest) - Activity 0   Pain Rating: FLACC (Rest) - Cry 0   Pain Rating: FLACC (Rest) - Consolability 0   Score: FLACC (Rest) 0   Restrictions/Precautions   Weight Bearing Precautions Per Order No   Other Precautions Chair Alarm;Bed Alarm;Fall Risk   Home Living   Type of Home House   Home Layout One level;Stairs to enter with rails  (5 QUE)   Home Equipment Walker  (Rollator)   Prior Function   Level of [place] Independent with functional mobility;Needs assistance with ADLs;Needs assistance with IADLS   Lives With Spouse;Son   Receives Help From Family   IADLs  Family/Friend/Other provides transportation;Family/Friend/Other provides meals;Family/Friend/Other provides medication management   Falls in the last 6 months 5 to 10   Vocational On disability   Lifestyle   Autonomy pta pt was (A) c ADLs and IADLs, (-) , lives w spouse and son, uses rollator at baseline   Reciprocal Relationships wife and son   Service to Others on disability   Intrinsic Gratification pt did not state at this time   General   Additional Pertinent History history of stroke in 2005 with residual visual field deficit, hx of cerebral aneurysm s/p coiling in 2003, chronic migraines, ARLEEN, CAD, COPD, chronic pain s/p spinal cord stimulator, HTN, DM2, dyslipidemia, h/o gastric bypass, CHF, presents with right facial droop and right-sided weakness/numbness,   Family/Caregiver Present No   Additional General Comments Pt was cooperative   ADL   Where Assessed Edge of bed   Eating Assistance 6  Modified independent   Grooming Assistance 5  Supervision/Setup   UB Bathing Assistance 4  Minimal Assistance   LB Bathing Assistance 4  Minimal Assistance   UB Dressing Assistance 4  Minimal Assistance   LB Dressing Assistance 4  Minimal Assistance   Toileting Assistance  4  Minimal Assistance   Additional Comments (S)  Pt requires assist at baseline for ADLS   Bed Mobility   Supine to Sit 6  Modified independent   Transfers   Sit to Stand 6  Modified independent   Stand to Sit 6  Modified independent   Functional Mobility   Functional Mobility 6  Modified independent   Additional Comments ax1, household distance   Additional items Rolling walker   Balance   Static Sitting Good   Dynamic Sitting Good   Static Standing Good   Dynamic Standing Good   Ambulatory Good   Activity Tolerance   Activity Tolerance Patient tolerated treatment well   Medical Staff Made Aware PT Mairelos   Nurse Made Aware HAILEY HINES Assessment   RUE Assessment WFL   LUE Assessment   LUE Assessment WFL   Psychosocial   Psychosocial (WDL)  WDL   Cognition   Overall Cognitive Status WFL   Arousal/Participation Alert;Cooperative   Attention Within functional limits   Orientation Level Oriented X4   Memory Within functional limits   Following Commands Follows multistep commands with increased time or repetition   Assessment   Limitation Decreased high-level ADLs   Prognosis Good   Assessment Pt is a 63 y.o. male seen for OT evaluation s/p admission to [facility] on 3/28/2024 due to R sided facial droop, numbness and weakness. Diagnosed with Stroke-like symptoms. Personal and env factors supporting pt at time of IE include age, supportive family, and (A) with all ADLs. Personal and env factors inhibiting engagement in occupations include difficulty completing ADLs. Performance deficits that affect the pt’s occupational performance can be seen above.  Despite pt's current functional limitations and medical complications pt is functioning at baseline. No further acute OT needs identified at this time. Recommend continued active ADL participation and mobilization with hospital staff while in the hospital to increase pt’s endurance and strength upon D/C. From OT standpoint, recommend D/C to home with family support when medically cleared. D/C pt from OT caseload at this time.   Goals   Patient Goals to go home   Plan   Treatment Interventions Continued evaluation;Energy conservation;Activityengagement   OT Frequency Eval only   Discharge Recommendation   Rehab Resource Intensity Level, OT No post-acute rehabilitation needs   Additional Comments  The patient's raw score on the AM-PAC Daily Activity Inpatient Short Form is 19. A raw score of greater than or equal to 19 suggests the patient may benefit from discharge to home. Please refer to the recommendation of the Occupational Therapist for safe discharge planning.   AM-PAC Daily Activity Inpatient   Lower Body Dressing 3   Bathing 3   Toileting 3   Upper Body Dressing 3   Grooming 3   Eating 4   Daily Activity Raw  Score 19   Daily Activity Standardized Score (Calc for Raw Score >=11) 40.22   AM-PAC Applied Cognition Inpatient   Following a Speech/Presentation 4   Understanding Ordinary Conversation 4   Taking Medications 4   Remembering Where Things Are Placed or Put Away 4   Remembering List of 4-5 Errands 4   Taking Care of Complicated Tasks 4   Applied Cognition Raw Score 24   Applied Cognition Standardized Score 62.21   End of Consult   Education Provided Yes   Patient Position at End of Consult Bedside chair;Bed/Chair alarm activated;All needs within reach   Nurse Communication Nurse aware of consult     Toyin Li OTR/ONIEL

## 2024-03-29 NOTE — PROGRESS NOTES
Progress Note - Neurology   Aristeo [address] 63 y.o. male MRN: [id number]  Unit/Bed#: -01 Encounter: [id number]      Assessment/Plan     * Stroke-like symptoms  Assessment & Plan  63-year-old male with history of stroke in 2005 with residual visual field deficit, hx of cerebral aneurysm s/p coiling in 2003, chronic migraines, ARLEEN, CAD, COPD, chronic pain s/p spinal cord stimulator, HTN, DM2, dyslipidemia, h/o gastric bypass, CHF,     Presented on 3/28 with right facial droop and right-sided weakness/numbness, present since evening of 3/27.    Initial neuro exam with visual field deficit (inconsistent with confrontational testing but appeared to blink less to threat on right) diminished sensation R face, subtle R facial asymmetry, and RLE drift. Patient admits he has NOT been taking his ASA/Plavix on a regular basis. MRI brain appears negative for clear CVA; discussed with attending, will still treat as TIA/MRI negative CVA.     Neuroimaging:  -CT head demonstrated no acute changes. Chronic bilateral occipital lobe and left cerebellar infarcts.  -CTA head/neck demonstrated coil embolization of the Acomm, occlusion of the left vertebral artery.  There was streak artifact from the coiling obscuring the right ICA terminus and bilateral A1 and proximal A2 segments.  -MRI brain: awaiting radiology read, per personal review with attending: no acute intracranial pathology/infarct    Plan:  -Stroke pathway ongoing:  -See neuroimaging above  -2D echocardiogram with bubble study pending; recommend OP cardiac rhythm monitoring (Ziopatch, +/- loop monitor)  -Hemoglobin A1c of 6.2, lipid panel with LDL of 88  -Loaded with  mg and Plavix 300 mg, continue aspirin 81 mg and Plavix 75 mg daily (3 months of DAPT per Dr. Curry). Counseled on importance of med compliance moving forward  -Continue home atorvastatin 80 mg.  -Telemetry monitoring  -PT/OT/speech therapy.  -Can pursue normotensive goals  -Frequent neuro  checks  -Continue to monitor and notify with change    Once 2D echo completed, no further inpatient neurologic workup, would be ok from neurology standpoint for discharge. Discussed plan of care with attending neurologist.     Aristeo Hall will need follow up in in 6 weeks with neurovascular attending or advance practitioner. He will not require outpatient neurological testing.    Subjective:   Patient resting in bed, family at bedside. Still with R facial asymmetry per wife at beside, R sided deficits remain improved, but not 100% resolved. Again patient and wife noted occasional non-compliance with meds at home PTA.     Vitals: Blood pressure 130/80, pulse 57, temperature 99 °F (37.2 °C), resp. rate 18, height 6' (1.829 m), weight (!) 148 kg (326 lb 4.5 oz), SpO2 94%.,Body mass index is 44.25 kg/m².    Examined alongside Dr. Miller, acted as scribe during exam.     Physical Exam:  Physical Exam  Constitutional:       Appearance: Normal appearance.   HENT:      Head: Normocephalic and atraumatic.   Eyes:      Extraocular Movements: Extraocular movements intact and EOM normal.      Conjunctiva/sclera: Conjunctivae normal.      Pupils: Pupils are equal, round, and reactive to light.   Cardiovascular:      Rate and Rhythm: Normal rate.   Abdominal:      General: There is no distension.   Musculoskeletal:      Cervical back: Normal range of motion and neck supple.   Skin:     General: Skin is warm and dry.   Neurological:      Mental Status: He is alert and oriented to person, place, and time.      Coordination: Finger-Nose-Finger Test and Heel to Shin Test normal.   Psychiatric:         Speech: Speech normal.        Neurologic Exam     Mental Status   Oriented to person, place, and time.   Attention: normal. Concentration: normal.   Speech: speech is normal   Normal comprehension.     Cranial Nerves     CN II   Visual fields full to confrontation.     CN III, IV, VI   Pupils are equal, round, and reactive to  light.  Extraocular motions are normal.   Nystagmus: none   Ophthalmoparesis: none  Conjugate gaze: present    CN V   Right facial sensation deficit: complete    CN VII   Facial expression full, symmetric.     CN VIII   CN VIII normal.     CN IX, X   CN IX normal.   CN X normal.     CN XI   CN XI normal.     CN XII   CN XII normal.     Motor Exam Subtle R LE drift, but without other focal deficits in the extremities with antigravity testing.      Sensory Exam   Light touch normal.     Gait, Coordination, and Reflexes     Coordination   Finger to nose coordination: normal  Heel to shin coordination: normal    Tremor   Resting tremor: absent  Intention tremor: absent       Lab, Imaging and other studies:   XR follow up   Final Result by Michael Sanchez DO (03/29 0853)      CT stroke alert brain   Final Result by Jose Watts MD (03/28 9282)      -Within the limitations of streak artifact from anterior communicating artery aneurysm coiling no vascular territory infarct or intracranial hemorrhage.   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      I personally communicated the findings via telephone with Cruz Curry  at 11:05 a.m. on 3/28/2024.         Workstation performed: [id number]         CTA stroke alert (head/neck)   Final Result by Jose Watts MD (03/28 3702)      CTA head:   -Status post coil embolization of anterior communicating artery region aneurysm. Possible enhancement along the medial margin of the coil mass which may represent volume averaging from A2 segment versus residual aneurysm, unchanged. MRA with and without    contrast can be obtained for further evaluation.   -Bilateral A1 segments, proximal A2 segments of the ACAs, right ICA terminus is not well evaluated due to streak artifact.   -Moderate to severe right and moderate left stenosis of the proximal intradural vertebral arteries due to atherosclerosis.      CTA neck:   -Occlusion of the left vertebral artery from its  origin to the mid V2 segment. There is distal reconstitution of the hypoplastic vertebral artery.   -Otherwise no high-grade stenosis, dissection or aneurysm involving the carotid or right vertebral arteries      I personally communicated the findings via telephone with Cruz Curry  at 11:05 a.m. on 3/28/2024.                           Workstation performed: [id number]         MRI brain w wo contrast    (Results Pending)     CBC:   Results from last 7 days   Lab Units 03/29/24  0525 03/28/24  1034   WBC Thousand/uL 11.03* 12.59*   RBC Million/uL 4.57 4.77   HEMOGLOBIN g/dL 13.7 14.2   HEMATOCRIT % 43.2 44.8   MCV fL 95 94   PLATELETS Thousands/uL 211 254   , BMP/CMP:   Results from last 7 days   Lab Units 03/29/24  0525 03/28/24  1034   SODIUM mmol/L 138 140   POTASSIUM mmol/L 4.0 3.9   CHLORIDE mmol/L 104 105   CO2 mmol/L 26 29   BUN mg/dL 14 15   CREATININE mg/dL 1.10 1.06   CALCIUM mg/dL 8.5 8.8   EGFR ml/min/1.73sq m 72 75   , Vitamin B12:   , HgBA1C:   Results from last 7 days   Lab Units 03/28/24  1034   HEMOGLOBIN A1C % 6.2*   , TSH:   Results from last 7 days   Lab Units 03/28/24  1034   TSH 3RD GENERATON uIU/mL 3.127   , Coagulation:   Results from last 7 days   Lab Units 03/28/24  1034   INR  1.11   , Lipid Profile:   Results from last 7 days   Lab Units 03/29/24  0525   HDL mg/dL 26*   LDL CALC mg/dL 88   TRIGLYCERIDES mg/dL 212*     VTE Prophylaxis: Sequential compression device (Venodyne)  and Enoxaparin (Lovenox)    Please see attending's attestation for total time spent/billing. Discussed plan of care with patient and primary team: reviewed MRI brain, treat as TIA vs. MRI negative CVA, DAPT/statin, vascular neurology/stroke clinic follow up.

## 2024-03-30 ENCOUNTER — APPOINTMENT (INPATIENT)
Dept: RADIOLOGY | Facility: HOSPITAL | Age: 64
DRG: 092 | End: 2024-03-30
Payer: COMMERCIAL

## 2024-03-30 PROBLEM — R93.0 ABNORMAL MRI OF HEAD: Status: ACTIVE | Noted: 2024-03-30

## 2024-03-30 LAB
ANION GAP SERPL CALCULATED.3IONS-SCNC: 5 MMOL/L (ref 4–13)
ATRIAL RATE: 62 BPM
BUN SERPL-MCNC: 15 MG/DL (ref 5–25)
CALCIUM SERPL-MCNC: 8.4 MG/DL (ref 8.4–10.2)
CHLORIDE SERPL-SCNC: 107 MMOL/L (ref 96–108)
CO2 SERPL-SCNC: 25 MMOL/L (ref 21–32)
CREAT SERPL-MCNC: 1.07 MG/DL (ref 0.6–1.3)
ERYTHROCYTE [DISTWIDTH] IN BLOOD BY AUTOMATED COUNT: 12.7 % (ref 11.6–15.1)
GFR SERPL CREATININE-BSD FRML MDRD: 74 ML/MIN/1.73SQ M
GLUCOSE SERPL-MCNC: 123 MG/DL (ref 65–140)
GLUCOSE SERPL-MCNC: 133 MG/DL (ref 65–140)
GLUCOSE SERPL-MCNC: 154 MG/DL (ref 65–140)
GLUCOSE SERPL-MCNC: 157 MG/DL (ref 65–140)
GLUCOSE SERPL-MCNC: 179 MG/DL (ref 65–140)
HCT VFR BLD AUTO: 39 % (ref 36.5–49.3)
HGB BLD-MCNC: 12.3 G/DL (ref 12–17)
MCH RBC QN AUTO: 29.4 PG (ref 26.8–34.3)
MCHC RBC AUTO-ENTMCNC: 31.5 G/DL (ref 31.4–37.4)
MCV RBC AUTO: 93 FL (ref 82–98)
P AXIS: 35 DEGREES
PLATELET # BLD AUTO: 186 THOUSANDS/UL (ref 149–390)
PMV BLD AUTO: 10 FL (ref 8.9–12.7)
POTASSIUM SERPL-SCNC: 3.8 MMOL/L (ref 3.5–5.3)
PR INTERVAL: 184 MS
QRS AXIS: -77 DEGREES
QRSD INTERVAL: 154 MS
QT INTERVAL: 458 MS
QTC INTERVAL: 464 MS
RBC # BLD AUTO: 4.18 MILLION/UL (ref 3.88–5.62)
SODIUM SERPL-SCNC: 137 MMOL/L (ref 135–147)
T WAVE AXIS: 9 DEGREES
VENTRICULAR RATE: 62 BPM
WBC # BLD AUTO: 10.31 THOUSAND/UL (ref 4.31–10.16)

## 2024-03-30 PROCEDURE — 80048 BASIC METABOLIC PNL TOTAL CA: CPT | Performed by: INTERNAL MEDICINE

## 2024-03-30 PROCEDURE — 99232 SBSQ HOSP IP/OBS MODERATE 35: CPT | Performed by: PHYSICIAN ASSISTANT

## 2024-03-30 PROCEDURE — 85027 COMPLETE CBC AUTOMATED: CPT | Performed by: INTERNAL MEDICINE

## 2024-03-30 PROCEDURE — 82948 REAGENT STRIP/BLOOD GLUCOSE: CPT

## 2024-03-30 PROCEDURE — 93010 ELECTROCARDIOGRAM REPORT: CPT | Performed by: INTERNAL MEDICINE

## 2024-03-30 PROCEDURE — 71046 X-RAY EXAM CHEST 2 VIEWS: CPT

## 2024-03-30 RX ORDER — FUROSEMIDE 10 MG/ML
40 INJECTION INTRAMUSCULAR; INTRAVENOUS
Status: DISCONTINUED | OUTPATIENT
Start: 2024-03-30 | End: 2024-03-31

## 2024-03-30 RX ORDER — AMOXICILLIN 250 MG
1 CAPSULE ORAL 2 TIMES DAILY
Status: DISCONTINUED | OUTPATIENT
Start: 2024-03-30 | End: 2024-03-31 | Stop reason: HOSPADM

## 2024-03-30 RX ORDER — POLYETHYLENE GLYCOL 3350 17 G/17G
17 POWDER, FOR SOLUTION ORAL DAILY
Status: DISCONTINUED | OUTPATIENT
Start: 2024-03-30 | End: 2024-03-31 | Stop reason: HOSPADM

## 2024-03-30 RX ORDER — CARVEDILOL 12.5 MG/1
12.5 TABLET ORAL 2 TIMES DAILY WITH MEALS
Status: DISCONTINUED | OUTPATIENT
Start: 2024-03-30 | End: 2024-03-31 | Stop reason: HOSPADM

## 2024-03-30 RX ORDER — AMLODIPINE BESYLATE 5 MG/1
5 TABLET ORAL DAILY
Status: DISCONTINUED | OUTPATIENT
Start: 2024-03-30 | End: 2024-03-31 | Stop reason: HOSPADM

## 2024-03-30 RX ORDER — LOSARTAN POTASSIUM 50 MG/1
50 TABLET ORAL DAILY
Status: DISCONTINUED | OUTPATIENT
Start: 2024-03-30 | End: 2024-03-31 | Stop reason: HOSPADM

## 2024-03-30 RX ADMIN — CARVEDILOL 12.5 MG: 12.5 TABLET, FILM COATED ORAL at 17:13

## 2024-03-30 RX ADMIN — RANOLAZINE 1000 MG: 500 TABLET, EXTENDED RELEASE ORAL at 17:12

## 2024-03-30 RX ADMIN — LOSARTAN POTASSIUM 50 MG: 50 TABLET, FILM COATED ORAL at 12:17

## 2024-03-30 RX ADMIN — GABAPENTIN 800 MG: 400 CAPSULE ORAL at 17:13

## 2024-03-30 RX ADMIN — BACLOFEN 10 MG: 10 TABLET ORAL at 08:11

## 2024-03-30 RX ADMIN — FUROSEMIDE 40 MG: 10 INJECTION, SOLUTION INTRAMUSCULAR; INTRAVENOUS at 17:12

## 2024-03-30 RX ADMIN — GABAPENTIN 800 MG: 400 CAPSULE ORAL at 22:01

## 2024-03-30 RX ADMIN — DOCUSATE SODIUM 100 MG: 100 CAPSULE, LIQUID FILLED ORAL at 08:12

## 2024-03-30 RX ADMIN — SENNOSIDES AND DOCUSATE SODIUM 1 TABLET: 8.6; 5 TABLET ORAL at 09:54

## 2024-03-30 RX ADMIN — BACLOFEN 10 MG: 10 TABLET ORAL at 20:37

## 2024-03-30 RX ADMIN — PANTOPRAZOLE SODIUM 40 MG: 40 TABLET, DELAYED RELEASE ORAL at 08:17

## 2024-03-30 RX ADMIN — ATORVASTATIN CALCIUM 80 MG: 40 TABLET, FILM COATED ORAL at 17:13

## 2024-03-30 RX ADMIN — RANOLAZINE 1000 MG: 500 TABLET, EXTENDED RELEASE ORAL at 08:12

## 2024-03-30 RX ADMIN — BUSPIRONE HYDROCHLORIDE 5 MG: 5 TABLET ORAL at 17:13

## 2024-03-30 RX ADMIN — GABAPENTIN 800 MG: 400 CAPSULE ORAL at 08:12

## 2024-03-30 RX ADMIN — INSULIN LISPRO 2 UNITS: 100 INJECTION, SOLUTION INTRAVENOUS; SUBCUTANEOUS at 17:27

## 2024-03-30 RX ADMIN — INSULIN LISPRO 2 UNITS: 100 INJECTION, SOLUTION INTRAVENOUS; SUBCUTANEOUS at 12:19

## 2024-03-30 RX ADMIN — POLYETHYLENE GLYCOL 3350 17 G: 17 POWDER, FOR SOLUTION ORAL at 09:55

## 2024-03-30 RX ADMIN — BUSPIRONE HYDROCHLORIDE 5 MG: 5 TABLET ORAL at 08:12

## 2024-03-30 RX ADMIN — GABAPENTIN 800 MG: 400 CAPSULE ORAL at 12:17

## 2024-03-30 RX ADMIN — INSULIN LISPRO 2 UNITS: 100 INJECTION, SOLUTION INTRAVENOUS; SUBCUTANEOUS at 08:11

## 2024-03-30 RX ADMIN — AMLODIPINE BESYLATE 5 MG: 5 TABLET ORAL at 12:17

## 2024-03-30 RX ADMIN — PANTOPRAZOLE SODIUM 40 MG: 40 TABLET, DELAYED RELEASE ORAL at 17:13

## 2024-03-30 RX ADMIN — TAMSULOSIN HYDROCHLORIDE 0.4 MG: 0.4 CAPSULE ORAL at 17:14

## 2024-03-30 RX ADMIN — ASPIRIN 81 MG CHEWABLE TABLET 81 MG: 81 TABLET CHEWABLE at 08:11

## 2024-03-30 RX ADMIN — CLOPIDOGREL BISULFATE 75 MG: 75 TABLET ORAL at 08:12

## 2024-03-30 RX ADMIN — ENOXAPARIN SODIUM 40 MG: 40 INJECTION SUBCUTANEOUS at 06:30

## 2024-03-30 RX ADMIN — TRAZODONE HYDROCHLORIDE 100 MG: 100 TABLET ORAL at 23:51

## 2024-03-30 RX ADMIN — SENNOSIDES AND DOCUSATE SODIUM 1 TABLET: 8.6; 5 TABLET ORAL at 17:13

## 2024-03-30 RX ADMIN — BACLOFEN 10 MG: 10 TABLET ORAL at 17:16

## 2024-03-30 RX ADMIN — FLUTICASONE FUROATE AND VILANTEROL TRIFENATATE 1 PUFF: 200; 25 POWDER RESPIRATORY (INHALATION) at 08:17

## 2024-03-30 RX ADMIN — ENOXAPARIN SODIUM 40 MG: 40 INJECTION SUBCUTANEOUS at 17:13

## 2024-03-30 NOTE — ASSESSMENT & PLAN NOTE
Lab Results   Component Value Date    EGFR 74 03/30/2024    EGFR 72 03/29/2024    EGFR 75 03/28/2024    CREATININE 1.07 03/30/2024    CREATININE 1.10 03/29/2024    CREATININE 1.06 03/28/2024     Baseline Cr. 0.9-1.1  Avoid nephrotoxic agents & hypotension

## 2024-03-30 NOTE — ASSESSMENT & PLAN NOTE
Wt Readings from Last 3 Encounters:   03/30/24 (!) 145 kg (319 lb 11.2 oz)   03/28/24 (!) 139 kg (306 lb)   03/26/24 (!) 138 kg (304 lb)     Concern for hypervolemia in setting of initially held home diuretic therapy  Wt gain, increased SOB, & 1-2 + LE edema   Obtain chest x-ray; scheduled Lasix 40 mg IV BID & re-eval tomorrow   Fortunately, no hypoxia  PTA Lasix 40 mg BID  Restart losartan, amlodipine, carvedilol  Baseline wt 290-295 lb; wt documented on 03/26 - 304 lb   ECHO (03/29):   EF 60%  Diastolic function     Right Ventricle: Right ventricular cavity size is dilated. Systolic function is normal.  I's & O's; weights  Diabetic low-salt diet

## 2024-03-30 NOTE — QUICK NOTE
MRI reviewed with on-call neurosurgery team at [facility].  MRA of head with and without contrast.  Patient to follow-up with results of the study as an outpatient if grossly benign.

## 2024-03-30 NOTE — ASSESSMENT & PLAN NOTE
Lab Results   Component Value Date    HGBA1C 6.2 (H) 03/28/2024     Recent Labs     03/29/24  1102 03/29/24  1639 03/29/24 2033 03/30/24  0717   POCGLU 134 180* 209* 157*     Blood Sugar Average: Last 72 hrs:  (P) 154.125    HgbA1c 6.2  ISS  Hypoglycemic protocol  PTA PO medications resumed at discharge

## 2024-03-30 NOTE — ASSESSMENT & PLAN NOTE
POA, R facial droop, R side weakness/paresthesia, & aphasia -symptoms improved, minor residual R droop; h/o chronic posterior circulation stroke   CT head (03/28):   No acute  Remote bilateral occipital lobe & left cerebellar infarcts  CTA head/neck (03/28):   S/p coil embolization of anterior communicating artery region aneurysm   Moderate to severe right and moderate left stenosis of the proximal intradural vertebral arteries due to atherosclerosis  Occlusion of the left vertebral artery from its origin to the mid V2 segment. There is distal reconstitution of the hypoplastic vertebral artery  Otherwise no high-grade stenosis, dissection or aneurysm involving the carotid or right vertebral arteries  MRI brain (03/29):  No acute CVA  Echo (03/29): Remarkable  Restart BP medications  Outpt f/u w/ CVA clinic 4-6 wks   Aspirin & Plavix x 3 months; continue statin   Neuro following, appreciate continued recommendations  PT/OT/ST consulted at admission

## 2024-03-30 NOTE — QUICK NOTE
Chest x-ray reviewed; pending formal read; appearance of some mild pulmonary vascular congestion with left pleural effusion, small

## 2024-03-30 NOTE — TELEMEDICINE
e-Consult (IPC)   Inpatient consult to Neurosurgery  Consult performed by: Vidhya Mera PA-C  Consult ordered by: Nancy Desai PA-C         Contacted by Nancy Desai PA-C.    Aristeo Hall 63 y.o. male MRN: [id number]  Unit/Bed#: -01 Encounter: [id number]    Reason for Consult  Per provider report, patient is a 63-year-old male with a PMH significant for prior stroke in 2005 with residual visual field deficit, history of cerebral aneurysm status post coiling in 2003, chronic migraines, ARLEEN, CAD, HTN, DMII, HLD, CHF, COPD, chronic pain status post thoracic spinal cord stimulator placement by Dr. Homero Cho in March 2021 who presented to the [facility] ER on 3/28 with a right-sided facial droop as well as right-sided weakness and numbness since the night prior.  Patient's symptoms resolved upon arrival.  Per report, he was not taking his home aspirin and Plavix.  Neurology was consulted and managing as a TIA/MRI negative CVA.  CTA completed for stroke workup in the ER did reveal concern for residual aneurysm status post coiling.  Neurosurgery was consulted for evaluation.    Available past medical history,social history, surgical history, medication list, drug allergies and review of systems were reviewed.    /63 (BP Location: Right arm)   Pulse 60   Temp 99.4 °F (37.4 °C) (Oral)   Resp 18   Ht 6' (1.829 m)   Wt (!) 145 kg (319 lb 11.2 oz)   SpO2 91%   BMI 43.36 kg/m²      Clinical exam:   (Per ANTHONY Desai)   No focal neuro deficits     Imaging:   3/28 CTA head/neck: Status post coil embolization of anterior communicating artery region aneurysm. Possible enhancement along the medial margin of the coil mass which may represent volume averaging from A2 segment versus residual aneurysm, unchanged. MRA with and without contrast can be obtained for further evaluation. Bilateral A1 segments, proximal A2 segments of the ACAs, right ICA terminus is not well evaluated due to streak artifact.  Moderate to severe right and moderate left stenosis of the proximal intradural vertebral arteries due to atherosclerosis. Occlusion of the left vertebral artery from its origin to the mid V2 segment. There is distal reconstitution of the hypoplastic vertebral artery. Otherwise no high-grade stenosis, dissection or aneurysm involving the carotid or right vertebral arteries    Assessment and Recommendations  Continue to closely monitor neuro exam   Frequent neuro checks per primary team   Repeat STAT CTH with any acute decline in GCS > 2pts or more in 1hr   Maintain normotensive BP goals, SBP < 160   No acute neuro surgical intervention indicated at this time   Case and imaging reviewed with Dr. Avendano   Recommend outpatient follow-up in the neurosurgery clinic with MRA head w/wo contrast  Appt to be scheduled with Dr. Avendano for follow-up  Imaging may can be completed as an inpatient or an outpatient  Per discussion with primary team MRA will be ordered this admission   Continue stroke workup and management per neurology on the stroke pathway  DVT ppx: SCDs, okay for chemical DVT prophylaxis from a neurosurgical standpoint.  Medical management per primary team   Pain control per primary team   PT/OT   Social work following for assistance with dispo once medically cleared     Neurosurgery will sign off at this time and we will follow-up with patient in the neurosurgery clinic with completed MRA in this regard.  Please reach out with any further questions or concerns.       All questions answered. Provider is in agreement with the course of action. 11-20 minutes, >50% of the total time devoted to medical consultative verbal/EMR discussion between providers. Written report will be generated in the EMR.

## 2024-03-30 NOTE — PROGRESS NOTES
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 There is distal reconstitution of the hypoplastic vertebral artery  Otherwise no high-grade stenosis, dissection or aneurysm involving the carotid or right vertebral arteries  MRI brain (03/29):  No acute CVA  Echo (03/29): Remarkable  Restart BP medications  Outpt f/u w/ CVA clinic 4-6 wks   Aspirin & Plavix x 3 months; continue statin   Neuro following, appreciate continued recommendations  PT/OT/ST consulted at admission     Chronic diastolic congestive heart failure (HCC)  Assessment & Plan  Wt Readings from Last 3 Encounters:   03/30/24 (!) 145 kg (319 lb 11.2 oz)   03/28/24 (!) 139 kg (306 lb)   03/26/24 (!) 138 kg (304 lb)     Concern for hypervolemia in setting of initially held home diuretic therapy  Wt gain, increased SOB, & 1-2 + LE edema   Obtain chest x-ray; scheduled Lasix 40 mg IV BID & re-eval tomorrow   Fortunately, no hypoxia  PTA Lasix 40 mg BID  Restart losartan, amlodipine, carvedilol  Baseline wt 290-295 lb; wt documented on 03/26 - 304 lb   ECHO (03/29):   EF 60%  Diastolic function     Right Ventricle: Right ventricular cavity size is dilated. Systolic function is normal.  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 Difficult to evaluate for residual or recurrent aneurysm on this study.   Case reviewed with neurosurgery  Outpatient neurosurgical follow-up required  Pending MRA of head w/ & w/o contrast     Type 2 diabetes mellitus with diabetic neuropathy, without long-term current use of insulin ([facility])  Assessment & Plan  Lab Results   Component Value Date    HGBA1C 6.2 (H) 03/28/2024     Recent Labs     03/29/24  1102 03/29/24  1639 03/29/24 2033 03/30/24  0717   POCGLU 134 180* 209* 157*     Blood Sugar Average: Last 72 hrs:  (P) 154.125    HgbA1c 6.2  ISS  Hypoglycemic protocol  PTA PO medications resumed at discharge        Myocarditis ([facility])  Assessment & Plan  H/o in 10/2023 myocarditis   Known to [facility] cardiology  Trop benign on 03/28    COPD (chronic obstructive pulmonary disease) ([facility])  Assessment & Plan  No acute exacerbation   Continue Advair & PRN bronchodilators    Hyperlipidemia  Assessment & Plan  Continue statin & ranexa  FLP: Cholesterol 156,  (H), LDL 88    Opioid dependence, continuous ([facility])  Assessment & Plan  Known to outpt pain management   PTA: morphine pump & robaxin prn  H/o spinal stimulator   PMDP reviewed prior provider     Stage 2 chronic kidney disease  Assessment & Plan  Lab Results   Component Value Date    EGFR 74 03/30/2024    EGFR 72 03/29/2024    EGFR 75 03/28/2024    CREATININE 1.07 03/30/2024    CREATININE 1.10 03/29/2024    CREATININE 1.06 03/28/2024     Baseline Cr. 0.9-1.1  Avoid nephrotoxic agents & hypotension     Constipated  Assessment & Plan  Last BM 9-10 days ago  Late bowel regiment  Benign abdominal exam; active bowel sounds; flat at    Morbid obesity ([facility])  Assessment & Plan  Lifestyle modifications encouraged  S/p gastric sleeve           VTE Pharmacologic  Prophylaxis: VTE Score: 9 High Risk (Score >/= 5) - Pharmacological DVT Prophylaxis Ordered: enoxaparin (Lovenox). Sequential Compression Devices Ordered.    Mobility:   Basic Mobility Inpatient Raw Score: 24  JH-HLM Goal: 8: Walk 250 feet or more  JH-HLM Achieved: 8: Walk 250 feet ot more  JH-HLM Goal achieved. Continue to encourage appropriate mobility.    Patient Centered Rounds: I performed bedside rounds with nursing staff today.   Discussions with Specialists or Other Care Team Provider: Neurology, neurosurgery    Education and Discussions with Family / Patient: Updated  (wife) via phone.    Total Time Spent on Date of Encounter in care of patient: 60 mins. This time was spent on one or more of the following: performing physical exam; counseling and coordination of care; obtaining or reviewing history; documenting in the medical record; reviewing/ordering tests, medications or procedures; communicating with other healthcare professionals and discussing with patient's family/caregivers.    Current Length of Stay: 2 day(s)  Current Patient Status: Inpatient   Certification Statement: The patient will continue to require additional inpatient hospital stay due to acute hypervolemia and pending MRA  Discharge Plan: Anticipate discharge in 24-48 hrs to home.    Code Status: Level 1 - Full Code    Subjective:     Patient is doing okay.  He admits his baseline shortness of breath is worse at rest.  He has not been ambulating frequently.  He is having decreased urine output since his diuretics were held.  He does note he has some chronic nausea and occasional eminences secondary to having the gastric sleeve.  His last bowel movement was 10 days ago.  Vitals:   Temp (24hrs), Av.1 °F (37.3 °C), Min:98.5 °F (36.9 °C), Max:99.4 °F (37.4 °C)    Temp:  [98.5 °F (36.9 °C)-99.4 °F (37.4 °C)] 99.4 °F (37.4 °C)  HR:  [60-66] 60  Resp:  [18-20] 18  BP: (130-136)/(63-83) 131/63  SpO2:  [91 %-93 %] 91 %  Body mass  index is 43.36 kg/m².     Input and Output Summary (last 24 hours):     Intake/Output Summary (Last 24 hours) at 3/30/2024 1049  Last data filed at 3/30/2024 0100  Gross per 24 hour   Intake --   Output 300 ml   Net -300 ml       Physical Exam:   Physical Exam  Constitutional:       General: He is not in acute distress.     Appearance: He is obese. He is not ill-appearing, toxic-appearing or diaphoretic.   HENT:      Head: Normocephalic.      Right Ear: External ear normal.      Left Ear: External ear normal.      Nose: Nose normal.      Mouth/Throat:      Mouth: Mucous membranes are moist.      Pharynx: Oropharynx is clear.   Eyes:      Extraocular Movements: Extraocular movements intact.      Conjunctiva/sclera: Conjunctivae normal.   Cardiovascular:      Rate and Rhythm: Normal rate and regular rhythm.      Pulses: Normal pulses.   Pulmonary:      Effort: No respiratory distress.      Breath sounds: No wheezing or rales.      Comments: Decreased bibasilarly; coarse but grossly clear breath sounds  Abdominal:      General: Abdomen is flat. Bowel sounds are normal. There is no distension.      Palpations: Abdomen is soft.      Tenderness: There is no abdominal tenderness.   Musculoskeletal:      Cervical back: Normal range of motion.      Right lower leg: Edema (1-2+) present.      Left lower leg: Edema (1-2+) present.      Comments: LLE slightly > than RLE - per baseline per pt    Neurological:      Mental Status: He is alert and oriented to person, place, and time. Mental status is at baseline.      Comments: Very slight right-sided   Psychiatric:         Mood and Affect: Mood normal.         Behavior: Behavior normal.        Additional Data:     Labs:  Results from last 7 days   Lab Units 03/30/24  0324   WBC Thousand/uL 10.31*   HEMOGLOBIN g/dL 12.3   HEMATOCRIT % 39.0   PLATELETS Thousands/uL 186     Results from last 7 days   Lab Units 03/30/24  0324   SODIUM mmol/L 137   POTASSIUM mmol/L 3.8   CHLORIDE  mmol/L 107   CO2 mmol/L 25   BUN mg/dL 15   CREATININE mg/dL 1.07   ANION GAP mmol/L 5   CALCIUM mg/dL 8.4   GLUCOSE RANDOM mg/dL 123     Results from last 7 days   Lab Units 24  1034   INR  1.11     Results from last 7 days   Lab Units 24  0717 24  2033 24  1639 24  1102 24  2051 24  1716 24  1217 24  1007   POC GLUCOSE mg/dl 157* 209* 180* 134 137 159* 134 123     Results from last 7 days   Lab Units 24  1034   HEMOGLOBIN A1C % 6.2*           Lines/Drains:  Invasive Devices       Peripheral Intravenous Line  Duration             Peripheral IV 24 Right Antecubital 2 days              Line  Duration             Pump Device Pain pump Left Abdomen -- days                      Telemetry:  Telemetry Orders (From admission, onward)               24 Hour Telemetry Monitoring  Continuous x 24 Hours (Telem)           Question:  Reason for 24 Hour Telemetry  Answer:  TIA/Suspected CVA/ Confirmed CVA                     Telemetry Reviewed: Normal Sinus Rhythm  Indication for Continued Telemetry Use: No indication for continued use. Will discontinue.              Imaging: Reviewed radiology reports from this admission including: chest xray, CT head, and MRI brain    Recent Cultures (last 7 days):         Last 24 Hours Medication List:   Current Facility-Administered Medications   Medication Dose Route Frequency Provider Last Rate    acetaminophen  650 mg Oral Q6H PRN Tiffany Ontiveros MD      aluminum-magnesium hydroxide-simethicone  30 mL Oral Q6H PRN Tiffany Ontiveros MD      amLODIPine  5 mg Oral Daily Nancy Desai PA-C      aspirin  81 mg Oral Daily Tiffany Ontiveros MD      atorvastatin  80 mg Oral After Dinner Tiffany Ontiveros MD      baclofen  10 mg Oral TID Tiffany Ontiveros MD      busPIRone  5 mg Oral BID Tiffany Ontiveros MD      carvedilol  12.5 mg Oral BID With Meals Nancy Desai PA-C      clopidogrel  75 mg Oral Daily Tiffany Ontiveros MD       enoxaparin  40 mg Subcutaneous Q12H Tiffany Ontiveros MD      fluticasone-vilanterol  1 puff Inhalation Daily Tiffany Ontiveros MD      furosemide  40 mg Intravenous BID (diuretic) Nancy Desai PA-C      gabapentin  800 mg Oral 4x Daily Tiffany Ontiveros MD      insulin lispro  2-12 Units Subcutaneous TID AC Tiffany Ontiveros MD      insulin lispro  2-12 Units Subcutaneous HS Tiffany Ontiveros MD      losartan  50 mg Oral Daily Nancy Desai PA-C      methocarbamol  750 mg Oral Q6H PRN Tiffany Ontiveros MD      ondansetron  4 mg Intravenous Q4H PRN Tiffany Ontiveros MD      pantoprazole  40 mg Oral BID AC Tiffany Ontiveros MD      patient supplied medication  1.5 each Intrathecal Infusion Continuous Tiffany Ontiveros MD      polyethylene glycol  17 g Oral Daily Nancy Desai PA-C      ranolazine  1,000 mg Oral BID Tiffany Ontiveros MD      senna-docusate sodium  1 tablet Oral BID Nancy Desai PA-C      tamsulosin  0.4 mg Oral Daily With Dinner Tiffany Ontiveros MD      traZODone  100 mg Oral HS Tiffany Ontiveros MD          Today, Patient Was Seen By: Nancy Desai PA-C    **Please Note: This note may have been constructed using a voice recognition system.**

## 2024-03-30 NOTE — PLAN OF CARE
Problem: PAIN - ADULT  Goal: Verbalizes/displays adequate comfort level or baseline comfort level  Description: Interventions:  - Encourage patient to monitor pain and request assistance  - Assess pain using appropriate pain scale  - Administer analgesics based on type and severity of pain and evaluate response  - Implement non-pharmacological measures as appropriate and evaluate response  - Consider cultural and social influences on pain and pain management  - Notify physician/advanced practitioner if interventions unsuccessful or patient reports new pain  Outcome: Progressing     Problem: Knowledge Deficit  Goal: Patient/family/caregiver demonstrates understanding of disease process, treatment plan, medications, and discharge instructions  Description: Complete learning assessment and assess knowledge base.  Interventions:  - Provide teaching at level of understanding  - Provide teaching via preferred learning methods  Outcome: Progressing     Problem: NEUROSENSORY - ADULT  Goal: Achieves stable or improved neurological status  Description: INTERVENTIONS  - Monitor and report changes in neurological status  - Monitor vital signs such as temperature, blood pressure, glucose, and any other labs ordered   - Initiate measures to prevent increased intracranial pressure  - Monitor for seizure activity and implement precautions if appropriate      Outcome: Progressing  Goal: Achieves maximal functionality and self care  Description: INTERVENTIONS  - Monitor swallowing and airway patency with patient fatigue and changes in neurological status  - Encourage and assist patient to increase activity and self care.   - Encourage visually impaired, hearing impaired and aphasic patients to use assistive/communication devices  Outcome: Progressing     Problem: Prexisting or High Potential for Compromised Skin Integrity  Goal: Skin integrity is maintained or improved  Description: INTERVENTIONS:  - Identify patients at risk for  skin breakdown  - Assess and monitor skin integrity  - Assess and monitor nutrition and hydration status  - Monitor labs   - Assess for incontinence   - Turn and reposition patient  - Assist with mobility/ambulation  - Relieve pressure over bony prominences  - Avoid friction and shearing  - Provide appropriate hygiene as needed including keeping skin clean and dry  - Evaluate need for skin moisturizer/barrier cream  - Collaborate with interdisciplinary team   - Patient/family teaching  - Consider wound care consult   Outcome: Progressing

## 2024-03-30 NOTE — ASSESSMENT & PLAN NOTE
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 Difficult to evaluate for residual or recurrent aneurysm on this study.   Case reviewed with neurosurgery  Outpatient neurosurgical follow-up required  Pending MRA of head w/ & w/o contrast

## 2024-03-30 NOTE — ASSESSMENT & PLAN NOTE
Known to outpt pain management   PTA: morphine pump & robaxin prn  H/o spinal stimulator   PMDP reviewed prior provider

## 2024-03-31 ENCOUNTER — APPOINTMENT (INPATIENT)
Dept: MRI IMAGING | Facility: HOSPITAL | Age: 64
DRG: 092 | End: 2024-03-31
Payer: COMMERCIAL

## 2024-03-31 ENCOUNTER — TELEPHONE (OUTPATIENT)
Dept: OTHER | Facility: HOSPITAL | Age: 64
End: 2024-03-31

## 2024-03-31 VITALS
SYSTOLIC BLOOD PRESSURE: 140 MMHG | HEART RATE: 53 BPM | WEIGHT: 315 LBS | HEIGHT: 72 IN | TEMPERATURE: 98.4 F | BODY MASS INDEX: 42.66 KG/M2 | DIASTOLIC BLOOD PRESSURE: 76 MMHG | RESPIRATION RATE: 18 BRPM | OXYGEN SATURATION: 92 %

## 2024-03-31 LAB
ANION GAP SERPL CALCULATED.3IONS-SCNC: 6 MMOL/L (ref 4–13)
BUN SERPL-MCNC: 16 MG/DL (ref 5–25)
CALCIUM SERPL-MCNC: 8.5 MG/DL (ref 8.4–10.2)
CHLORIDE SERPL-SCNC: 107 MMOL/L (ref 96–108)
CO2 SERPL-SCNC: 26 MMOL/L (ref 21–32)
CREAT SERPL-MCNC: 1.18 MG/DL (ref 0.6–1.3)
ERYTHROCYTE [DISTWIDTH] IN BLOOD BY AUTOMATED COUNT: 13.2 % (ref 11.6–15.1)
GFR SERPL CREATININE-BSD FRML MDRD: 66 ML/MIN/1.73SQ M
GLUCOSE SERPL-MCNC: 112 MG/DL (ref 65–140)
GLUCOSE SERPL-MCNC: 127 MG/DL (ref 65–140)
GLUCOSE SERPL-MCNC: 185 MG/DL (ref 65–140)
HCT VFR BLD AUTO: 38.8 % (ref 36.5–49.3)
HGB BLD-MCNC: 12.3 G/DL (ref 12–17)
MAGNESIUM SERPL-MCNC: 2.1 MG/DL (ref 1.9–2.7)
MCH RBC QN AUTO: 29.7 PG (ref 26.8–34.3)
MCHC RBC AUTO-ENTMCNC: 31.7 G/DL (ref 31.4–37.4)
MCV RBC AUTO: 94 FL (ref 82–98)
PLATELET # BLD AUTO: 199 THOUSANDS/UL (ref 149–390)
PMV BLD AUTO: 10.3 FL (ref 8.9–12.7)
POTASSIUM SERPL-SCNC: 3.8 MMOL/L (ref 3.5–5.3)
RBC # BLD AUTO: 4.14 MILLION/UL (ref 3.88–5.62)
SODIUM SERPL-SCNC: 139 MMOL/L (ref 135–147)
WBC # BLD AUTO: 11.01 THOUSAND/UL (ref 4.31–10.16)

## 2024-03-31 PROCEDURE — 99239 HOSP IP/OBS DSCHRG MGMT >30: CPT | Performed by: FAMILY MEDICINE

## 2024-03-31 PROCEDURE — 82948 REAGENT STRIP/BLOOD GLUCOSE: CPT

## 2024-03-31 PROCEDURE — 70546 MR ANGIOGRAPH HEAD W/O&W/DYE: CPT

## 2024-03-31 PROCEDURE — A9585 GADOBUTROL INJECTION: HCPCS | Performed by: NURSE PRACTITIONER

## 2024-03-31 PROCEDURE — 83735 ASSAY OF MAGNESIUM: CPT | Performed by: PHYSICIAN ASSISTANT

## 2024-03-31 PROCEDURE — 80048 BASIC METABOLIC PNL TOTAL CA: CPT | Performed by: PHYSICIAN ASSISTANT

## 2024-03-31 PROCEDURE — 85027 COMPLETE CBC AUTOMATED: CPT | Performed by: PHYSICIAN ASSISTANT

## 2024-03-31 RX ORDER — FUROSEMIDE 10 MG/ML
80 INJECTION INTRAMUSCULAR; INTRAVENOUS ONCE
Status: COMPLETED | OUTPATIENT
Start: 2024-03-31 | End: 2024-03-31

## 2024-03-31 RX ORDER — GADOBUTROL 604.72 MG/ML
14 INJECTION INTRAVENOUS
Status: COMPLETED | OUTPATIENT
Start: 2024-03-31 | End: 2024-03-31

## 2024-03-31 RX ORDER — TRAZODONE HYDROCHLORIDE 100 MG/1
100 TABLET ORAL
Qty: 30 TABLET | Refills: 0 | Status: SHIPPED | OUTPATIENT
Start: 2024-03-31

## 2024-03-31 RX ADMIN — ENOXAPARIN SODIUM 40 MG: 40 INJECTION SUBCUTANEOUS at 06:22

## 2024-03-31 RX ADMIN — POLYETHYLENE GLYCOL 3350 17 G: 17 POWDER, FOR SOLUTION ORAL at 09:23

## 2024-03-31 RX ADMIN — CARVEDILOL 12.5 MG: 12.5 TABLET, FILM COATED ORAL at 09:26

## 2024-03-31 RX ADMIN — GABAPENTIN 800 MG: 400 CAPSULE ORAL at 14:00

## 2024-03-31 RX ADMIN — FLUTICASONE FUROATE AND VILANTEROL TRIFENATATE 1 PUFF: 200; 25 POWDER RESPIRATORY (INHALATION) at 09:23

## 2024-03-31 RX ADMIN — FUROSEMIDE 80 MG: 10 INJECTION, SOLUTION INTRAMUSCULAR; INTRAVENOUS at 11:58

## 2024-03-31 RX ADMIN — INSULIN LISPRO 2 UNITS: 100 INJECTION, SOLUTION INTRAVENOUS; SUBCUTANEOUS at 14:02

## 2024-03-31 RX ADMIN — GADOBUTROL 14 ML: 604.72 INJECTION INTRAVENOUS at 12:50

## 2024-03-31 RX ADMIN — ASPIRIN 81 MG CHEWABLE TABLET 81 MG: 81 TABLET CHEWABLE at 09:22

## 2024-03-31 RX ADMIN — LOSARTAN POTASSIUM 50 MG: 50 TABLET, FILM COATED ORAL at 09:23

## 2024-03-31 RX ADMIN — RANOLAZINE 1000 MG: 500 TABLET, EXTENDED RELEASE ORAL at 10:14

## 2024-03-31 RX ADMIN — PANTOPRAZOLE SODIUM 40 MG: 40 TABLET, DELAYED RELEASE ORAL at 06:22

## 2024-03-31 RX ADMIN — GABAPENTIN 800 MG: 400 CAPSULE ORAL at 09:22

## 2024-03-31 RX ADMIN — BUSPIRONE HYDROCHLORIDE 5 MG: 5 TABLET ORAL at 09:22

## 2024-03-31 RX ADMIN — CLOPIDOGREL BISULFATE 75 MG: 75 TABLET ORAL at 09:22

## 2024-03-31 RX ADMIN — AMLODIPINE BESYLATE 5 MG: 5 TABLET ORAL at 09:22

## 2024-03-31 RX ADMIN — FUROSEMIDE 40 MG: 10 INJECTION, SOLUTION INTRAMUSCULAR; INTRAVENOUS at 09:27

## 2024-03-31 RX ADMIN — BACLOFEN 10 MG: 10 TABLET ORAL at 09:23

## 2024-03-31 RX ADMIN — SENNOSIDES AND DOCUSATE SODIUM 1 TABLET: 8.6; 5 TABLET ORAL at 09:22

## 2024-03-31 NOTE — TELEPHONE ENCOUNTER
Called patient regarding MRA results. Told him that:    1.  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 Increased size of 4 mm focal enhancement along the posteromedial margin of the coil mass suspicious for flow in the base of the aneurysm. Recommend further assessment with catheter angiogram.  2.  Patent major vessels of the [place] without high-grade stenosis.    Patient states he has residual weakness and some nausea. I highly recommended he got to [facility] for recoiling potentially if his symptoms worsen tomorrow. He agrees.

## 2024-03-31 NOTE — DISCHARGE SUMMARY
[facility]  Discharge- Aristeo Hall 1960, 63 y.o. 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 There is distal reconstitution of the hypoplastic vertebral artery  Otherwise no high-grade stenosis, dissection or aneurysm involving the carotid or right vertebral arteries  MRI brain (03/29):  No acute CVA  Echo (03/29): Remarkable  Restart BP medications  Outpt f/u w/ CVA clinic 4-6 wks   Aspirin & Plavix x 3 months; continue statin   Neuro following, appreciate continued recommendations  PT/OT/ST consulted at admission      Abnormal MRI of head  Assessment & Plan  H/o cerebral aneurysm s/p coiling  MRI of brain (03/29):  Sequela of metallic embolization of previously noted right anterior communicating artery aneurysm. Difficult to evaluate for residual or recurrent aneurysm on this study.   Case reviewed with neurosurgery  Outpatient neurosurgical follow-up required  Pending MRA of head w/ & w/o contrast, obtained inpatient on 03/31 will review outpatient.    Type 2 diabetes mellitus with diabetic neuropathy, without long-term current use of insulin (HCC)  Assessment & Plan  Lab Results   Component Value Date    HGBA1C 6.2 (H) 03/28/2024     Recent Labs     03/30/24  1630  03/30/24 2027 03/31/24  0735 03/31/24  1142   POCGLU 154* 133 127 185*     Blood Sugar Average: Last 72 hrs:  (P) [id number]    HgbA1c 6.2  ISS  Hypoglycemic protocol  PTA PO medications resumed at discharge         Myocarditis ([facility])  Assessment & Plan  H/o in 10/2023 myocarditis   Known to [facility] cardiology  Trop benign on 03/28     Status post insertion of spinal cord stimulator  Assessment & Plan       COPD (chronic obstructive pulmonary disease) ([facility])  Assessment & Plan  No acute exacerbation   Continue Advair & PRN bronchodilators     Hyperlipidemia  Assessment & Plan  Continue statin & ranexa  FLP: Cholesterol 156,  (H), LDL 88     Opioid dependence, continuous ([facility])  Assessment & Plan  Known to outpt pain management   PTA: morphine pump & robaxin prn  H/o spinal stimulator   PMDP reviewed prior provider      Stage 2 chronic kidney disease  Assessment & Plan  Lab Results   Component Value Date    EGFR 66 03/31/2024    EGFR 74 03/30/2024    EGFR 72 03/29/2024    CREATININE 1.18 03/31/2024    CREATININE 1.07 03/30/2024    CREATININE 1.10 03/29/2024     Baseline Cr. 0.9-1.1  Avoid nephrotoxic agents & hypotension      Constipated  Assessment & Plan  Last BM 9-10 days ago  Late bowel regiment  Benign abdominal exam; active bowel sounds; flat at     Morbid obesity ([facility])  Assessment & Plan  Lifestyle modifications encouraged  S/p gastric sleeve     Chronic diastolic congestive heart failure ([facility])  Assessment & Plan  Wt Readings from Last 3 Encounters:   03/31/24 (!) 146 kg (322 lb)   03/28/24 (!) 139 kg (306 lb)   03/26/24 (!) 138 kg (304 lb)     Concern for hypervolemia in setting of initially held home diuretic therapy  Wt gain, increased SOB, & 1-2 + LE edema   Obtain chest x-ray; scheduled Lasix 40 mg IV BID & re-eval tomorrow   Fortunately, no hypoxia  PTA Lasix 40 mg BID  Restart losartan, amlodipine, carvedilol  Baseline wt 290-295 lb; wt documented on 03/26 - 304 lb   ECHO (03/29):   EF  "60%  Diastolic function     Right Ventricle: Right ventricular cavity size is dilated. Systolic function is normal.  I's & O's; weights  Diabetic low-salt diet                 Medical Problems       Resolved Problems  Date Reviewed: 3/31/2024   None         Admission Date:   Admission Orders (From admission, onward)       Ordered        03/28/24 1137  INPATIENT ADMISSION  Once                            Admitting Diagnosis: Obesity [E66.9]  Facial droop [R29.810]  Stroke (cerebrum) (HCC) [I63.9]  Chronic kidney disease [N18.9]  Sensory deficit, right [R44.9]    HPI: (Per Dr. Ontiveros on 03/28)    \"Aristeo Hall is a 63 y.o. male with a PMH of prior CVA, diabetes, COPD, CHF, CKD stage II, morbid obesity, opioid dependence who presents with slurring of words and right facial droop that started late last evening.  States that it has slightly improved.  States that he was using his phone however his phone could not recognize what he was saying.  He also endorses right sided weakness and numbness that has also been improving.  Patient has visual field defects consistent with previous stroke.  Neuro was consulted recommending admission under stroke pathway.  Will obtain A1c lipid panel and TSH.  Will obtain MRI and echo and placed on telemetry.  Will continue home regimen of Lipitor 80 mg and continue with dual antiplatelet therapy after loaded today.\"      Procedures Performed:   Orders Placed This Encounter   Procedures    ED ECG Documentation Only       Summary of Hospital Course: pt admited for stroke like symptoms. CT head did not show acute infacts but old occipital and cerebellar infarcts. CTA was negative for bleed but did show stenosis. MRI did not show acute CVA. MRA was obtained on day of discharge, pt needs neurosurg and neuro f/u.    Significant Findings, Care, Treatment and Services Provided: none    Complications: none    Condition at Discharge: stable       Physical Exam  Constitutional:       " General: He is not in acute distress.     Appearance: He is obese. He is not ill-appearing, toxic-appearing or diaphoretic.   HENT:      Head: Normocephalic.      Right Ear: External ear normal.      Left Ear: External ear normal.      Nose: Nose normal.      Mouth/Throat:      Mouth: Mucous membranes are moist.      Pharynx: Oropharynx is clear.   Eyes:      Extraocular Movements: Extraocular movements intact.      Conjunctiva/sclera: Conjunctivae normal.   Cardiovascular:      Rate and Rhythm: Normal rate and regular rhythm.      Pulses: Normal pulses.   Pulmonary:      Effort: No respiratory distress.      Breath sounds: No wheezing or rales.      Comments: Decreased bibasilarly; coarse but grossly clear breath sounds  Abdominal:      General: Abdomen is flat. Bowel sounds are normal. There is no distension.      Palpations: Abdomen is soft.      Tenderness: There is no abdominal tenderness.   Musculoskeletal:      Cervical back: Normal range of motion.      Right lower leg: Edema (1-2+) present.      Left lower leg: Edema (1-2+) present.      Comments: LLE slightly > than RLE - per baseline per pt    Neurological:      Mental Status: He is alert and oriented to person, place, and time. Mental status is at baseline.      Comments: Very slight right-sided   Psychiatric:         Mood and Affect: Mood normal.         Behavior: Behavior normal.           Discharge instructions/Information to patient and family:   See after visit summary for information provided to patient and family.      Provisions for Follow-Up Care:  See after visit summary for information related to follow-up care and any pertinent home health orders.      PCP: JACLYN Ospina    Disposition: Home    Planned Readmission: No    Discharge Statement   I spent 30 minutes discharging the patient. This time was spent on the day of discharge. I had direct contact with the patient on the day of discharge. Additional documentation is required if more  than 30 minutes were spent on discharge.     Discharge Medications:  See after visit summary for reconciled discharge medications provided to patient and family.

## 2024-03-31 NOTE — ASSESSMENT & PLAN NOTE
Wt Readings from Last 3 Encounters:   03/31/24 (!) 146 kg (322 lb)   03/28/24 (!) 139 kg (306 lb)   03/26/24 (!) 138 kg (304 lb)     Concern for hypervolemia in setting of initially held home diuretic therapy  Wt gain, increased SOB, & 1-2 + LE edema   Obtain chest x-ray; scheduled Lasix 40 mg IV BID & re-eval tomorrow   Fortunately, no hypoxia  PTA Lasix 40 mg BID  Restart losartan, amlodipine, carvedilol  Baseline wt 290-295 lb; wt documented on 03/26 - 304 lb   ECHO (03/29):   EF 60%  Diastolic function     Right Ventricle: Right ventricular cavity size is dilated. Systolic function is normal.  I's & O's; weights  Diabetic low-salt diet

## 2024-03-31 NOTE — UTILIZATION REVIEW
Initial Clinical Review    Admission: Date/Time/Statement:   Admission Orders (From admission, onward)       Ordered        03/28/24 1137  INPATIENT ADMISSION  Once                          Orders Placed This Encounter   Procedures    INPATIENT ADMISSION     Standing Status:   Standing     Number of Occurrences:   1     Order Specific Question:   Level of Care     Answer:   Med Surg [16]     Order Specific Question:   Estimated length of stay     Answer:   More than 2 Midnights     Order Specific Question:   Certification     Answer:   I certify that inpatient services are medically necessary for this patient for a duration of greater than two midnights. See H&P and MD Progress Notes for additional information about the patient's course of treatment.     ED Arrival Information       Expected   -    Arrival   3/28/2024 09:57    Acuity   Urgent              Means of arrival   Walk-In    Escorted by   Self    Service   Hospitalist    Admission type   Emergency              Arrival complaint   Facial droop, numbness on right side             Chief Complaint   Patient presents with    Facial Droop     Pt reports startting with right sided facial droop and words slurring last evening at 2000. Pt reports facial droop improved since       Initial Presentation: 63 y.o. male  male to ED via walk in from home.    Admitted to inpatient with Dx: stroke like symptoms.  Presented to ED with right sided facial droop with word slurring starting evening prior to arrival about 8pm.  Facial droop with some improvement. PMHx: prior CVA, diabetes, COPD, CHF, CKD stage II, morbid obesity, opioid dependence . On exam: mild right sided facial sensory deficit.  Hypertensive.  Wbc 12.59.   Imaging shows .Remote bilateral occipital lobe and left cerebellar infarcts on ct brain.  L vertebral occlusion with distal reconstitaution on cta of head and neck.    ED treatment:  asa 324 mg, Plavix 300 mg.    Plan includes neuro checks, MRI brain.   Echo.  Continue DAPT.  Continue home Lipitor.  Check A1c, TSH, lipid panel.  Permissive hypertension, home Lasix, Norvasc, Coreg and Cozaar on hold. Start SSI.   Consult neurology    3/28/24 per neurology- Stroke like symptoms: admits to missed doses of DAPT with history of prior stroke.   Recommend:  neuro checks, load patient with ASA and plavix, continue statin. MRI brain. Echo    Date: 3/29/24    Day 2:  has numbness R upper thigh.   Residual R facial droop.  + volume overload  On exam: appears chronically ill.  Abdominal distention.   Bilateral LE edema.  Wbc 11.03. gluocse 134 - 209 per neurology,  MRI brain wo showed no sign of acute infarct, but cannot rule out MRI negative stroke .  continue DAPT with ASA and Plavix.  Check echo.      Patient has crossed 3 midnights and requires ongoing care    3/31/2024 .  Patient presents with shortness of breath worse than baseline.   Has decreased urine output.   Last BM 10 days ago.   On exam obese.  LLE slightly > RLE with bilateral +1 to 2 edema.  Very slight right sided facial droop  Abnormal labs or imaging:  wbc 10.31. glucose 157  Diagnosis/Plan    stroke like symptoms/Chronic CHF.  Continue neuro checks.   Continue DAPT.   Restart BP medications:  Losartan, amlodipine and Coreg.    Start Lasix IV Bid and check CxR.   Consult neurosurgery    Per neurosurgery - consult due to concern for residual aneurysm status post coiling.  Recommend frequent neuro checks.  Normotension.   No acute neurosurgical intervention.   Follow up with MRA head and OP clinic.     ED Triage Vitals   Temperature Pulse Respirations Blood Pressure SpO2   03/28/24 1007 03/28/24 1005 03/28/24 1005 03/28/24 1007 03/28/24 1007   98 °F (36.7 °C) 80 20 169/98 97 %      Temp Source Heart Rate Source Patient Position - Orthostatic VS BP Location FiO2 (%)   03/28/24 1007 03/28/24 1005 03/28/24 1007 03/28/24 1007 --   Temporal Monitor Sitting Left arm       Pain Score       03/28/24 1220       7           Wt Readings from Last 1 Encounters:   03/31/24 (!) 146 kg (322 lb)     Additional Vital Signs:   03/31/24 07:59:45 98.4 °F (36.9 °C) 53 Abnormal  -- 140/76 97 92 % -- --   03/30/24 2100 -- -- -- -- -- -- None (Room air) --   03/30/24 20:30:46 98.4 °F (36.9 °C) -- 18 134/84 101 -- -- --   03/30/24 14:42:26 99.8 °F (37.7 °C) 56 17 127/70 89 94 % -- --   03/30/24 12:16:40 98.5 °F (36.9 °C) 56 20 155/83 107 92 % -- --   03/30/24 03:15:42 99.4 °F (37.4 °C) 60 18 131/63 86 91 % None (Room air) Lying   03/30/24 0300 99.4 °F (37.4 °C) -- 18 131/63 81 -- None (Room air) Lying   03/30/24 00:34:28 -- 64 -- 133/73 93 93 % -- --   03/29/24 21:55:09 -- 66 -- 134/74 94 93 % -- --   03/29/24 20:34:50 98.9 °F (37.2 °C) 64 20 132/74 93 91 % -- --   03/29/24 2000 -- -- -- -- -- 91 % None (Room air) --   03/29/24 16:19:50 98.5 °F (36.9 °C) 63 18 130/76 94 91 % -- --   03/29/24 1355 -- 63 -- 136/83 -- -- -- --   03/29/24 07:49:15 99 °F (37.2 °C) 57 18 130/80 97 94 % None (Room air) Lying   03/29/24 05:24:36 100 °F (37.8 °C) 61 -- 136/83 101 92 % -- --   03/29/24 00:55:26 99.4 °F (37.4 °C) 56 -- 109/51 70 88 % Abnormal        Date and Time Eye Opening Best Verbal Response Best Motor Response Dorothy Coma Scale Score   03/31/24 0900 4 5 6 15   03/30/24 2030 4 5 6 15   03/29/24 2000 4 5 6 15   03/29/24 0524 4 5 6 15   03/29/24 0000 4 5 6 15   03/28/24 2139 4 5 6 15   03/28/24 1930 4 5 6 15   03/28/24 1757 4 5 6 15   03/28/24 1230 4 5 6 15   03/28/24 1130 4 5 6 15   03/28/24 1115 4 5 6 15   03/28/24 1100 4 5 6 15   03/28/24 1045 4 5 6 15   03/28/24 1030 4 5 6 15   03/28/24 1015 4 5 6 15     Pertinent Labs/Diagnostic Test Results:   MRA head w wo contrast   Final Result by Bessy Germain MD (03/31 1078)      1.  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 Increased size of 4 mm focal enhancement along the posteromedial margin of the coil mass suspicious for flow in the base of the aneurysm. Recommend further  assessment with    catheter angiogram.   2.  Patent major vessels of the [place] without high-grade stenosis.               The study was marked in EPIC for significant notification.               Workstation performed: [id number]         MRI brain w wo contrast   Final Result by Radu Newman MD (03/29 1215)      No acute intracranial abnormality or abnormal enhancement.      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 Difficult to evaluate for residual or recurrent aneurysm on this study. Recommend consultation with the [facility], a [facility] of [facility] at [phone].            Workstation performed: [id number]         XR follow up   Final Result by Michael Sanchez DO (03/29 0835)      CT stroke alert brain   Final Result by Jose Watts MD (03/28 0049)      -Within the limitations of streak artifact from anterior communicating artery aneurysm coiling no vascular territory infarct or intracranial hemorrhage.   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      I personally communicated the findings via telephone with Cruz Curry  at 11:05 a.m. on 3/28/2024.         Workstation performed: [id number]         CTA stroke alert (head/neck)   Final Result by Jose Watts MD (03/28 1114)      CTA head:   -Status post coil embolization of anterior communicating artery region aneurysm. Possible enhancement along the medial margin of the coil mass which may represent volume averaging from A2 segment versus residual aneurysm, unchanged. MRA with and without    contrast can be obtained for further evaluation.   -Bilateral A1 segments, proximal A2 segments of the ACAs, right ICA terminus is not well evaluated due to streak artifact.   -Moderate to severe right and moderate  left stenosis of the proximal intradural vertebral arteries due to atherosclerosis.      CTA neck:   -Occlusion of the left vertebral artery from its origin to the mid V2 segment. There is distal reconstitution of the hypoplastic vertebral artery.   -Otherwise no high-grade stenosis, dissection or aneurysm involving the carotid or right vertebral arteries      I personally communicated the findings via telephone with Cruz Curry  at 11:05 a.m. on 3/28/2024.                           Workstation performed: [id number]         XR chest pa & lateral    (Results Pending)     3/28/24 ecg Normal sinus rhythm  Right bundle branch block  Left anterior fascicular block   Bifascicular block   Abnormal ECG  When compared with ECG of 26-AUG-2023 10:26,  No significant change was found    3/29/24 echo Left Ventricle: Left ventricular cavity size is normal. Wall thickness is mildly increased. The left ventricular ejection fraction is 60% by biplane measurement. Systolic function is normal. Although no diagnostic regional wall motion abnormality was identified, this possibility cannot be completely excluded on the basis of this study. Diastolic function is normal.    Right Ventricle: Right ventricular cavity size is dilated. Systolic function is normal.    Right Atrium: The atrium is dilated.    Atrial Septum: No patent foramen ovale detected, confirmed by provocation with cough, using agitated saline contrast and with valsalva, using agitated saline contrast.    Aortic Valve: There is aortic valve sclerosis.    Aorta: The aortic root is normal in size. The ascending aorta is mildly dilated. The aortic root is 3.50 cm. The ascending aorta is 3.9 cm.    Results from last 7 days   Lab Units 03/31/24  0451 03/30/24  0324 03/29/24  0525 03/28/24  1034   WBC Thousand/uL 11.01* 10.31* 11.03* 12.59*   HEMOGLOBIN g/dL 12.3 12.3 13.7 14.2   HEMATOCRIT % 38.8 39.0 43.2 44.8   PLATELETS Thousands/uL 199 186 211 254     Results from last 7  days   Lab Units 03/31/24  0451 03/30/24  0324 03/29/24  0525 03/28/24  1034   SODIUM mmol/L 139 137 138 140   POTASSIUM mmol/L 3.8 3.8 4.0 3.9   CHLORIDE mmol/L 107 107 104 105   CO2 mmol/L 26 25 26 29   ANION GAP mmol/L 6 5 8 6   BUN mg/dL 16 15 14 15   CREATININE mg/dL 1.18 1.07 1.10 1.06   EGFR ml/min/1.73sq m 66 74 72 75   CALCIUM mg/dL 8.5 8.4 8.5 8.8   MAGNESIUM mg/dL 2.1  --  2.2  --          Results from last 7 days   Lab Units 03/31/24  1142 03/31/24  0735 03/30/24 2027 03/30/24  1630 03/30/24  1128 03/30/24  0717 03/29/24  2033 03/29/24  1639 03/29/24  1102 03/28/24  2051 03/28/24  1716 03/28/24  1217   POC GLUCOSE mg/dl 185* 127 133 154* 179* 157* 209* 180* 134 137 159* 134     Results from last 7 days   Lab Units 03/31/24  0451 03/30/24  0324 03/29/24  0525 03/28/24  1034   GLUCOSE RANDOM mg/dL 112 123 124 109     Results from last 7 days   Lab Units 03/28/24  1034   HEMOGLOBIN A1C % 6.2*   EAG mg/dl 131     Results from last 7 days   Lab Units 03/28/24  1532 03/28/24  1345 03/28/24  1034   HS TNI 0HR ng/L  --   --  6   HS TNI 2HR ng/L  --  6  --    HSTNI D2 ng/L  --  0  --    HS TNI 4HR ng/L 5  --   --    HSTNI D4 ng/L -1  --   --      Results from last 7 days   Lab Units 03/28/24  1034   PROTIME seconds 14.8*   INR  1.11   PTT seconds 26     Results from last 7 days   Lab Units 03/28/24  1034   TSH 3RD GENERATON uIU/mL 3.127     ED Treatment:   Medication Administration from 03/28/2024 0957 to 03/28/2024 1214         Date/Time Order Dose Route Action Comments     03/28/2024 1128 EDT aspirin chewable tablet 324 mg 324 mg Oral Given --     03/28/2024 1128 EDT clopidogrel (PLAVIX) tablet 300 mg 300 mg Oral Given --          Past Medical History:   Diagnosis Date    Acute on chronic diastolic congestive heart failure (HCC)     Altered gait     Alzheimer disease (HCC)     per patients,,early onset     Angina pectoris (HCC)     Anxiety     Arthritis     Brain aneurysm     coils placed    Cardiac disease      Chest pain 01/13/2016    Chronic kidney disease     Chronic pain     back/ right groin and rle- has morphine pump    Constipation     COPD (chronic obstructive pulmonary disease) ([facility])     Coronary artery disease     CPAP (continuous positive airway pressure) dependence     Decubital ulcer     sacral decub-occured 5/2019-sees wound care/debide in OR today 6/6/2019    Dependent on walker for ambulation     w/c for long distance    Depression     Diabetes mellitus ([facility])     insulin dependent    Dizziness     occ    Dysphagia     Enlarged prostate     Esophageal stricture In file    Esophageal varices ([facility])     Fall     Fall at home 05/03/2019    GERD (gastroesophageal reflux disease)     Heart failure ([facility])     Hiatal hernia     Hx of gastric bypass 11/19/2018    Hypercholesterolemia     Hypertension     MI (myocardial infarction) ([facility])     2017- stents x2    Migraine     Morbid obesity ([facility])     gastric bypass sleeve 11/2018-wt loss 125 lb    Neuropathy     Oxygen dependent     Q HS  2LPM with CPAP and prn during day 2-3 LPM     Pressure injury of skin     Pressure injury of skin of sacral region 06/03/2019    Added automatically from request for surgery [id number]    Renal disorder     Shortness of breath     Skin abnormality     sacral wound - covered with pad    Sleep apnea     Stented coronary artery     Stroke ([facility])     vision loss b/l  2005, residual R leg weakness    Type 2 diabetes mellitus with diabetic neuropathy, with long-term current use of insulin ([facility]) 10/18/2019    Type 2 diabetes mellitus with renal complication ([facility])     insulin dependent    Urinary frequency     Use of cane as ambulatory aid     Wears dentures     Wears glasses     Wears glasses     Wheelchair dependent      Present on Admission:   Chronic diastolic congestive heart failure ([facility])   Morbid obesity ([facility])   Stage 2 chronic kidney disease   Opioid dependence, continuous ([facility])   Hyperlipidemia   COPD (chronic obstructive pulmonary  disease) (HCC)   Myocarditis (HCC)   Type 2 diabetes mellitus with diabetic neuropathy, without long-term current use of insulin (HCC)   Constipated      Admitting Diagnosis: Obesity [E66.9]  Facial droop [R29.810]  Stroke (cerebrum) (HCC) [I63.9]  Chronic kidney disease [N18.9]  Sensory deficit, right [R44.9]  Age/Sex: 63 y.o. male  Admission Orders:  Scheduled Medications:  acetaminophen  650 mg Oral Q6H PRN Tiffany Ontiveros MD     aluminum-magnesium hydroxide-simethicone  30 mL Oral Q6H PRN     Started 3/30 amLODIPine  5 mg Oral Daily      aspirin  81 mg Oral Daily      atorvastatin  80 mg Oral After Dinner      baclofen  10 mg Oral TID      busPIRone  5 mg Oral BID     started 3/30 carvedilol  12.5 mg Oral BID With Meals      clopidogrel  75 mg Oral Daily      enoxaparin  40 mg Subcutaneous Q12H      fluticasone-vilanterol  1 puff Inhalation Daily     Started 3/30 furosemide  40 mg Intravenous BID (diuretic)      gabapentin  800 mg Oral 4x Daily      insulin lispro  2-12 Units Subcutaneous TID AC      insulin lispro  2-12 Units Subcutaneous HS     Started 3/30 losartan  50 mg Oral Daily      methocarbamol  750 mg Oral Q6H PRN      ondansetron  4 mg Intravenous Q4H PRN      pantoprazole  40 mg Oral BID AC      patient supplied medication  1.5 each Intrathecal Infusion Continuous      polyethylene glycol  17 g Oral Daily      ranolazine  1,000 mg Oral BID     Started 3/30 senna-docusate sodium  1 tablet Oral BID      tamsulosin  0.4 mg Oral Daily With Dinner      traZODone  100 mg Oral HS       Prn medication not used.     Telemetry   PT/OT/Speech  Neuro checks   Every 1 hour x 4 hours, then every 2 hours x 8 hours, then every 4 hours x 72 hours         IP CONSULT TO NEUROLOGY  IP CONSULT TO NEUROSURGERY    Network Utilization Review Department  ATTENTION: Please call with any questions or concerns to [phone] and carefully listen to the prompts so that you are directed to the right person. All voicemails are  confidential.   For Discharge needs, contact Care Management DC Support Team at [phone] opt. 2  Send all requests for admission clinical reviews, approved or denied determinations and any other requests to dedicated fax number below belonging to the campus where the patient is receiving treatment. List of dedicated fax numbers for the Facilities:  FACILITY NAME UR FAX NUMBER   ADMISSION DENIALS (Administrative/Medical Necessity) [phone]   DISCHARGE SUPPORT TEAM (NETWORK) [phone]   PARENT CHILD HEALTH (Maternity/NICU/Pediatrics) [phone]   [facility] [phone]   [facility] [phone]   [facility] [phone]   [facility] [phone]   Critical access hospital [phone]   [facility] [phone]   [facility] [phone]   [facility] [phone]   [facility] [phone]   [facility] [phone]   [facility] [phone]   [facility] [phone]

## 2024-03-31 NOTE — ASSESSMENT & PLAN NOTE
Lab Results   Component Value Date    EGFR 66 03/31/2024    EGFR 74 03/30/2024    EGFR 72 03/29/2024    CREATININE 1.18 03/31/2024    CREATININE 1.07 03/30/2024    CREATININE 1.10 03/29/2024     Baseline Cr. 0.9-1.1  Avoid nephrotoxic agents & hypotension

## 2024-03-31 NOTE — ASSESSMENT & PLAN NOTE
Lab Results   Component Value Date    HGBA1C 6.2 (H) 03/28/2024     Recent Labs     03/30/24  1630 03/30/24 2027 03/31/24  0735 03/31/24  1142   POCGLU 154* 133 127 185*     Blood Sugar Average: Last 72 hrs:  (P) [id number]    HgbA1c 6.2  ISS  Hypoglycemic protocol  PTA PO medications resumed at discharge

## 2024-03-31 NOTE — ASSESSMENT & PLAN NOTE
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 Difficult to evaluate for residual or recurrent aneurysm on this study.   Case reviewed with neurosurgery  Outpatient neurosurgical follow-up required  Pending MRA of head w/ & w/o contrast, obtained inpatient on 03/31 will review outpatient.

## 2024-03-31 NOTE — PLAN OF CARE
Problem: PAIN - ADULT  Goal: Verbalizes/displays adequate comfort level or baseline comfort level  Description: Interventions:  - Encourage patient to monitor pain and request assistance  - Assess pain using appropriate pain scale  - Administer analgesics based on type and severity of pain and evaluate response  - Implement non-pharmacological measures as appropriate and evaluate response  - Consider cultural and social influences on pain and pain management  - Notify physician/advanced practitioner if interventions unsuccessful or patient reports new pain  Outcome: Progressing     Problem: SAFETY ADULT  Goal: Patient will remain free of falls  Description: INTERVENTIONS:  - Educate patient/family on patient safety including physical limitations  - Instruct patient to call for assistance with activity   - Consult OT/PT to assist with strengthening/mobility   - Keep Call bell within reach  - Keep bed low and locked with side rails adjusted as appropriate  - Keep care items and personal belongings within reach  - Initiate and maintain comfort rounds  - Make Fall Risk Sign visible to staff  - Offer Toileting every 2 Hours, in advance of need  - Initiate/Maintain alarm  - Obtain necessary fall risk management equipment:   - Apply yellow socks and bracelet for high fall risk patients  - Consider moving patient to room near nurses station  Outcome: Progressing     Problem: Knowledge Deficit  Goal: Patient/family/caregiver demonstrates understanding of disease process, treatment plan, medications, and discharge instructions  Description: Complete learning assessment and assess knowledge base.  Interventions:  - Provide teaching at level of understanding  - Provide teaching via preferred learning methods  Outcome: Progressing     Problem: NEUROSENSORY - ADULT  Goal: Achieves stable or improved neurological status  Description: INTERVENTIONS  - Monitor and report changes in neurological status  - Monitor vital signs such  as temperature, blood pressure, glucose, and any other labs ordered   - Initiate measures to prevent increased intracranial pressure  - Monitor for seizure activity and implement precautions if appropriate      Outcome: Progressing

## 2024-03-31 NOTE — ASSESSMENT & PLAN NOTE
H/o in 10/2023 myocarditis   Known to [facility] cardiology  Trop benign on 03/28

## 2024-03-31 NOTE — PLAN OF CARE
Problem: PAIN - ADULT  Goal: Verbalizes/displays adequate comfort level or baseline comfort level  Description: Interventions:  - Encourage patient to monitor pain and request assistance  - Assess pain using appropriate pain scale  - Administer analgesics based on type and severity of pain and evaluate response  - Implement non-pharmacological measures as appropriate and evaluate response  - Consider cultural and social influences on pain and pain management  - Notify physician/advanced practitioner if interventions unsuccessful or patient reports new pain  Outcome: Progressing     Problem: SAFETY ADULT  Goal: Patient will remain free of falls  Description: INTERVENTIONS:  - Educate patient/family on patient safety including physical limitations  - Instruct patient to call for assistance with activity   - Consult OT/PT to assist with strengthening/mobility   - Keep Call bell within reach  - Keep bed low and locked with side rails adjusted as appropriate  - Keep care items and personal belongings within reach  - Initiate and maintain comfort rounds  - Make Fall Risk Sign visible to staff  - Offer Toileting every 2 Hours, in advance of need  - Initiate/Maintain alarm  - Obtain necessary fall risk management equipment:   - Apply yellow socks and bracelet for high fall risk patients  - Consider moving patient to room near nurses station  Outcome: Progressing  Goal: Maintain or return to baseline ADL function  Description: INTERVENTIONS:  -  Assess patient's ability to carry out ADLs; assess patient's baseline for ADL function and identify physical deficits which impact ability to perform ADLs (bathing, care of mouth/teeth, toileting, grooming, dressing, etc.)  - Assess/evaluate cause of self-care deficits   - Assess range of motion  - Assess patient's mobility; develop plan if impaired  - Assess patient's need for assistive devices and provide as appropriate  - Encourage maximum independence but intervene and  supervise when necessary  - Involve family in performance of ADLs  - Assess for home care needs following discharge   - Consider OT consult to assist with ADL evaluation and planning for discharge  - Provide patient education as appropriate  Outcome: Progressing     Problem: Knowledge Deficit  Goal: Patient/family/caregiver demonstrates understanding of disease process, treatment plan, medications, and discharge instructions  Description: Complete learning assessment and assess knowledge base.  Interventions:  - Provide teaching at level of understanding  - Provide teaching via preferred learning methods  Outcome: Progressing     Problem: NEUROSENSORY - ADULT  Goal: Achieves stable or improved neurological status  Description: INTERVENTIONS  - Monitor and report changes in neurological status  - Monitor vital signs such as temperature, blood pressure, glucose, and any other labs ordered   - Initiate measures to prevent increased intracranial pressure  - Monitor for seizure activity and implement precautions if appropriate      Outcome: Progressing  Goal: Achieves maximal functionality and self care  Description: INTERVENTIONS  - Monitor swallowing and airway patency with patient fatigue and changes in neurological status  - Encourage and assist patient to increase activity and self care.   - Encourage visually impaired, hearing impaired and aphasic patients to use assistive/communication devices  Outcome: Progressing     Problem: Prexisting or High Potential for Compromised Skin Integrity  Goal: Skin integrity is maintained or improved  Description: INTERVENTIONS:  - Identify patients at risk for skin breakdown  - Assess and monitor skin integrity  - Assess and monitor nutrition and hydration status  - Monitor labs   - Assess for incontinence   - Turn and reposition patient  - Assist with mobility/ambulation  - Relieve pressure over bony prominences  - Avoid friction and shearing  - Provide appropriate hygiene as  needed including keeping skin clean and dry  - Evaluate need for skin moisturizer/barrier cream  - Collaborate with interdisciplinary team   - Patient/family teaching  - Consider wound care consult   Outcome: Progressing

## 2024-04-01 ENCOUNTER — HOSPITAL ENCOUNTER (EMERGENCY)
Facility: HOSPITAL | Age: 64
Discharge: HOME | End: 2024-04-01
Attending: EMERGENCY MEDICINE | Admitting: EMERGENCY MEDICINE
Payer: COMMERCIAL

## 2024-04-01 ENCOUNTER — TELEPHONE (OUTPATIENT)
Dept: NEUROSURGERY | Facility: CLINIC | Age: 64
End: 2024-04-01

## 2024-04-01 VITALS
HEART RATE: 74 BPM | RESPIRATION RATE: 18 BRPM | SYSTOLIC BLOOD PRESSURE: 178 MMHG | DIASTOLIC BLOOD PRESSURE: 80 MMHG | OXYGEN SATURATION: 95 % | TEMPERATURE: 97.6 F

## 2024-04-01 DIAGNOSIS — R93.0 ABNORMAL MRI OF HEAD: ICD-10-CM

## 2024-04-01 DIAGNOSIS — R29.90 STROKE-LIKE SYMPTOMS: Primary | ICD-10-CM

## 2024-04-01 PROBLEM — E11.9 TYPE 2 DIABETES MELLITUS, WITH LONG-TERM CURRENT USE OF INSULIN (HCC): Status: ACTIVE | Noted: 2017-10-11

## 2024-04-01 PROBLEM — H53.8 BLURRING OF VISUAL IMAGE: Status: ACTIVE | Noted: 2024-04-01

## 2024-04-01 PROBLEM — Z79.4 TYPE 2 DIABETES MELLITUS, WITH LONG-TERM CURRENT USE OF INSULIN (HCC): Status: ACTIVE | Noted: 2017-10-11

## 2024-04-01 LAB
ANION GAP SERPL CALCULATED.3IONS-SCNC: 9 MMOL/L (ref 4–13)
BASOPHILS # BLD AUTO: 0.04 THOUSANDS/ÂΜL (ref 0–0.1)
BASOPHILS NFR BLD AUTO: 0 % (ref 0–1)
BUN SERPL-MCNC: 14 MG/DL (ref 5–25)
CALCIUM SERPL-MCNC: 8.4 MG/DL (ref 8.4–10.2)
CHLORIDE SERPL-SCNC: 106 MMOL/L (ref 96–108)
CO2 SERPL-SCNC: 22 MMOL/L (ref 21–32)
CREAT SERPL-MCNC: 1.24 MG/DL (ref 0.6–1.3)
EOSINOPHIL # BLD AUTO: 0.33 THOUSAND/ÂΜL (ref 0–0.61)
EOSINOPHIL NFR BLD AUTO: 3 % (ref 0–6)
ERYTHROCYTE [DISTWIDTH] IN BLOOD BY AUTOMATED COUNT: 13.3 % (ref 11.6–15.1)
GFR SERPL CREATININE-BSD FRML MDRD: 62 ML/MIN/1.73SQ M
GLUCOSE SERPL-MCNC: 143 MG/DL (ref 65–140)
HCT VFR BLD AUTO: 41.2 % (ref 36.5–49.3)
HGB BLD-MCNC: 13.1 G/DL (ref 12–17)
IMM GRANULOCYTES # BLD AUTO: 0.09 THOUSAND/UL (ref 0–0.2)
IMM GRANULOCYTES NFR BLD AUTO: 1 % (ref 0–2)
LYMPHOCYTES # BLD AUTO: 1.36 THOUSANDS/ÂΜL (ref 0.6–4.47)
LYMPHOCYTES NFR BLD AUTO: 13 % (ref 14–44)
MCH RBC QN AUTO: 30.2 PG (ref 26.8–34.3)
MCHC RBC AUTO-ENTMCNC: 31.8 G/DL (ref 31.4–37.4)
MCV RBC AUTO: 95 FL (ref 82–98)
MONOCYTES # BLD AUTO: 0.6 THOUSAND/ÂΜL (ref 0.17–1.22)
MONOCYTES NFR BLD AUTO: 6 % (ref 4–12)
NEUTROPHILS # BLD AUTO: 7.91 THOUSANDS/ÂΜL (ref 1.85–7.62)
NEUTS SEG NFR BLD AUTO: 77 % (ref 43–75)
NRBC BLD AUTO-RTO: 0 /100 WBCS
PLATELET # BLD AUTO: 197 THOUSANDS/UL (ref 149–390)
PMV BLD AUTO: 10.3 FL (ref 8.9–12.7)
POTASSIUM SERPL-SCNC: 3.6 MMOL/L (ref 3.5–5.3)
RBC # BLD AUTO: 4.34 MILLION/UL (ref 3.88–5.62)
SODIUM SERPL-SCNC: 137 MMOL/L (ref 135–147)
WBC # BLD AUTO: 10.33 THOUSAND/UL (ref 4.31–10.16)

## 2024-04-01 PROCEDURE — 36415 COLL VENOUS BLD VENIPUNCTURE: CPT

## 2024-04-01 PROCEDURE — 99285 EMERGENCY DEPT VISIT HI MDM: CPT | Performed by: EMERGENCY MEDICINE

## 2024-04-01 PROCEDURE — NC001 PR NO CHARGE: Performed by: PSYCHIATRY & NEUROLOGY

## 2024-04-01 PROCEDURE — 99215 OFFICE O/P EST HI 40 MIN: CPT | Performed by: NEUROLOGICAL SURGERY

## 2024-04-01 PROCEDURE — 85025 COMPLETE CBC W/AUTO DIFF WBC: CPT

## 2024-04-01 PROCEDURE — 99284 EMERGENCY DEPT VISIT MOD MDM: CPT

## 2024-04-01 PROCEDURE — 80048 BASIC METABOLIC PNL TOTAL CA: CPT

## 2024-04-01 NOTE — CONSULTS
"  NEUROLOGY RESIDENCY CONSULT NOTE     Name: Aristeo Hall   Age & Sex: 63 y.o. male   MRN: [id number]  Unit/Bed#: QCF   Encounter: [id number]  Length of Stay: 0    Aristeo Hall will need follow up in in 3 months with general neurology .  He will not require outpatient neurological testing.     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  On my eval today, pt with old peripheral visual defect, unchanged. Reports \"blurry\" vision. Right scalp and V3 distribution slightly different sensation than left. Right arm weak strength, but may have been limited by effort.     Imaging: 3/31 MRA: Redemonstrated coil embolization of anterior communicating artery aneurysm. Increased size of 4 mm focal enhancement along the posteromedial margin of the coil mass suspicious for flow in the base of the aneurysm. Recommend further assessment with   catheter angiogram.  Patent major vessels of the [place] without high-grade stenosis.    Impression: possible MRI negative stroke, doubt symptoms are due to aneurysm     Plan:   Continue DAPT- confirm patient's compliance  No need for further imaging at this time  Neurosurgery eval appreciated- no need for acute intervention at this time, outpatient visit has been rescheduled to 4/15    History of CVA (cerebrovascular accident)  Assessment & Plan  Chronic infarcts stable from prior imaging.   Continue DAPT, " statin        SUBJECTIVE     Reason for Consult / Principal Problem: cerebral aneurysm, R facial/arm numbness, and facial droop   Hx and PE limited by: none    HPI: Aristeo Hall is a 63 y.o.  male who presented to [facility] on 3/28 due to R facial and arm numbness, R facial droop, and dizziness with a feeling of imbalance. His symptoms had significantly improved by 3/29. He underwent MRI brain that was negative for acute strokes. He then underwent MRA that showed stable chronic infarcts and coiled aneurysm, that showed enlargement by 4mm. The results of MRA were called to him after discharge on 3/31. Patient at that time endorsed persistent symptoms, though not worsening per my conversation with the patient. He however was very worried about the increasing aneurysm, so presented to [facility] for further evaluation and consultation by neurosurgery and neurology to ensure that no intervention was necessary for his aneurysm.     At my evaluation, patient endorsed improvement of facial droop. He reported blurry vision, though it seems this is acute on chronic per chart review and patient. He has a persistent peripheral visual field deficit since his prior strokes over 20 years ago. He reports persistent right arm weakness.     Inpatient consult to Neurology  Consult performed by: María Maldonado DO  Consult ordered by: Lilly Oreilly MD        Historical Information   Past Medical History:   Diagnosis Date    Acute on chronic diastolic congestive heart failure (HCC)     Altered gait     Alzheimer disease (HCC)     per patients,,early onset     Angina pectoris (HCC)     Anxiety     Arthritis     Brain aneurysm     coils placed    Cardiac disease     Chest pain 01/13/2016    Chronic kidney disease     Chronic pain     back/ right groin and rle- has morphine pump    Constipation     COPD (chronic obstructive pulmonary disease) (HCC)     Coronary artery disease     CPAP (continuous positive airway pressure) dependence      Decubital ulcer     sacral decub-occured 5/2019-sees wound care/debide in OR today 6/6/2019    Dependent on walker for ambulation     w/c for long distance    Depression     Diabetes mellitus ([facility])     insulin dependent    Dizziness     occ    Dysphagia     Enlarged prostate     Esophageal stricture In file    Esophageal varices ([facility])     Fall     Fall at home 05/03/2019    GERD (gastroesophageal reflux disease)     Heart failure ([facility])     Hiatal hernia     Hx of gastric bypass 11/19/2018    Hypercholesterolemia     Hypertension     MI (myocardial infarction) ([facility])     2017- stents x2    Migraine     Morbid obesity ([facility])     gastric bypass sleeve 11/2018-wt loss 125 lb    Neuropathy     Oxygen dependent     Q HS  2LPM with CPAP and prn during day 2-3 LPM     Pressure injury of skin     Pressure injury of skin of sacral region 06/03/2019    Added automatically from request for surgery [id number]    Renal disorder     Shortness of breath     Skin abnormality     sacral wound - covered with pad    Sleep apnea     Stented coronary artery     Stroke ([facility])     vision loss b/l  2005, residual R leg weakness    Type 2 diabetes mellitus with diabetic neuropathy, with long-term current use of insulin ([facility]) 10/18/2019    Type 2 diabetes mellitus with renal complication ([facility])     insulin dependent    Urinary frequency     Use of cane as ambulatory aid     Wears dentures     Wears glasses     Wears glasses     Wheelchair dependent      Past Surgical History:   Procedure Laterality Date    BACK SURGERY      BRAIN SURGERY      CARDIAC CATHETERIZATION      with stents    CARDIAC CATHETERIZATION N/A 8/18/2023    Procedure: Cardiac Coronary Angiogram;  Surgeon: Horacio Weiner MD;  Location: [facility];  Service: Cardiology    CEREBRAL ANEURYSM REPAIR      with coils    COLONOSCOPY      ESOPHAGOGASTRODUODENOSCOPY N/A 7/1/2016    Procedure: ESOPHAGOGASTRODUODENOSCOPY (EGD);  Surgeon: Reno Christiansen MD;  Location: [facility];   Service:     GASTRIC BYPASS  11/19/2018    HERNIA REPAIR      HIATAL HERNIA REPAIR      INFUSION PUMP IMPLANTATION Left     morphine    INTRATHECAL PUMP IMPLANTATION Left 7/9/2020    Procedure: REVISION INTRATHECAL PAIN PUMP POCKET, LEFT ABDOMEN;  Surgeon: Homero Cho MD;  Location: [facility];  Service: Neurosurgery    KNEE ARTHROSCOPY Right     KNEE ARTHROSCOPY Right     PERONEAL NERVE DECOMPRESSION Right     RI DEBRIDEMENT OPEN WOUND FIRST 20 SQ CM/< N/A 6/6/2019    Procedure: EXCISIONAL DEBRIDEMENT OF SACRAL DECUBITUS ULCER;  Surgeon: Siddhartha Omer MD;  Location: [facility];  Service: General    RI ESOPHAGOGASTRODUODENOSCOPY TRANSORAL DIAGNOSTIC N/A 2/27/2017    Procedure: ESOPHAGOGASTRODUODENOSCOPY (EGD);  Surgeon: Reno Christiansen MD;  Location: [facility];  Service: Gastroenterology    RI ESOPHAGOGASTRODUODENOSCOPY TRANSORAL DIAGNOSTIC N/A 8/23/2018    Procedure: ESOPHAGOGASTRODUODENOSCOPY (EGD) with biopsy;  Surgeon: Reno Christiansen MD;  [facility]: [facility];  Service: Gastroenterology    RI IMPLTJ/RPLCMT ITHCL/EDRL DRUG NFS PRGRBL PUMP Left 10/13/2020    Procedure: EXPLORATION OF INTRATHECAL PAIN PUMP SYSTEM INTEGRITY FOR POSSIBLE REPLACEMENT OF CATHETER AND PUMP.;  Surgeon: Homero Cho MD;  Location: [facility];  Service: Neurosurgery    RI IMPLTJ/RPLCMT ITHCL/EDRL DRUG NFS SUBQ RSVR N/A 1/19/2017    Procedure: REMOVAL / EXCHANGE INTRATHECAL PAIN PUMP;  Surgeon: Que Leonard MD;  [facility];  Service: Orthopedics    RI IMPLTJ/RPLCMT ITHCL/EDRL DRUG NFS SUBQ RSVR N/A 5/16/2016    Procedure: REPLACEMENT AND PROGRAM PUMP ;  Surgeon: Que Leonard MD;  [facility];  Service: Orthopedics    RI PRQ IMPLTJ NSTIM ELECTRODE ARRAY EPIDURAL Right 3/17/2021    Procedure: INSERTION THORACIC DORSAL COLUMN SPINAL CORD STIMULATOR PERCUTANEOUS W IMPLANTABLE PULSE GENERATOR, RIGHT;  Surgeon: Homero Cho MD;  Location: [facility];  Service: Neurosurgery     Social History   Social History     Substance and  Sexual Activity   Alcohol Use Never     Social History     Substance and Sexual Activity   Drug Use Never    Types: Marijuana     E-Cigarette/Vaping    E-Cigarette Use Never User      E-Cigarette/Vaping Substances    Nicotine No     THC No     CBD No     Flavoring No     Other No     Unknown No      Social History     Tobacco Use   Smoking Status Never   Smokeless Tobacco Never     Family History:   Family History   Problem Relation Age of Onset    Diabetes unspecified Mother     Diabetes Mother     Heart attack Father     Heart disease Father     Hypertension Father     Stroke Father     Diabetes unspecified Brother     Depression Brother     Mental illness Brother     Diabetes unspecified Brother     Diabetes unspecified Maternal Grandmother     Diabetes unspecified Paternal Grandmother     Diabetes unspecified Paternal Uncle     ADD / ADHD Cousin     Colon cancer Neg Hx     Colon polyps Neg Hx      Meds/Allergies   all current active meds have been reviewed  No Known Allergies    Review of previous medical records was completed.       Review of Systems   Constitutional:  Negative for chills and fever.   Respiratory:  Negative for cough and shortness of breath.    Cardiovascular:  Negative for chest pain.   Gastrointestinal:  Negative for abdominal distention.   Neurological:  Positive for weakness and numbness. Negative for dizziness, facial asymmetry, light-headedness and headaches.       OBJECTIVE     Patient ID: Aristeo Hall is a 63 y.o. male.    Vitals:   Vitals:    24 1005   BP: (!) 178/80   Pulse: 74   Resp: 18   Temp: 97.6 °F (36.4 °C)   SpO2: 95%      There is no height or weight on file to calculate BMI.   No intake or output data in the 24 hours ending 24 1634    Temperature:   Temp (24hrs), Av.6 °F (36.4 °C), Min:97.6 °F (36.4 °C), Max:97.6 °F (36.4 °C)    Temperature: 97.6 °F (36.4 °C)    Invasive Devices:   Invasive Devices       Line  Duration             Pump Device Pain  pump Left Abdomen -- days                    Physical Exam  Vitals and nursing note reviewed.   Constitutional:       General: He is not in acute distress.     Appearance: He is well-developed.   HENT:      Head: Normocephalic and atraumatic.   Eyes:      Extraocular Movements: Extraocular movements intact and EOM normal.      Pupils: Pupils are equal, round, and reactive to light.   Cardiovascular:      Rate and Rhythm: Normal rate and regular rhythm.      Heart sounds: No murmur heard.  Pulmonary:      Effort: Pulmonary effort is normal. No respiratory distress.      Breath sounds: Normal breath sounds.   Abdominal:      Palpations: Abdomen is soft.      Tenderness: There is no abdominal tenderness.   Musculoskeletal:         General: No swelling.      Cervical back: Neck supple.   Skin:     General: Skin is warm and dry.      Capillary Refill: Capillary refill takes less than 2 seconds.   Neurological:      Mental Status: He is alert and oriented to person, place, and time.   Psychiatric:         Mood and Affect: Mood normal.         Speech: Speech normal.          Neurologic Exam     Mental Status   Oriented to person, place, and time.   Attention: normal.   Speech: speech is normal   Level of consciousness: alert    Cranial Nerves     CN II   Right visual field deficit: upper nasal quadrant(s)  Left visual field deficit: upper nasal quadrant(s)    CN III, IV, VI   Pupils are equal, round, and reactive to light.  Extraocular motions are normal.     CN V   Right facial sensation deficit: mandible    Motor Exam   Right arm tone: normal  Left arm tone: normal  Right arm pronator drift: absent  Left arm pronator drift: absent  Right leg tone: normal  Left leg tone: normal    Strength   Right strength: Limited by patient effort, as he was able to push himself with his arms back in the bed and hold arms up for 10 seconds.   Right biceps: 2/5  Left biceps: 5/5  Right triceps: 2/5  Left triceps: 5/5    Sensory Exam    Light touch normal.   Right arm light touch: normal  Left arm light touch: normal    Gait, Coordination, and Reflexes Not able to elicit reflexes, however patient walked from door to bed without issue              LABORATORY DATA     Labs: I have personally reviewed pertinent reports.    Results from last 7 days   Lab Units 04/01/24  1106 03/31/24  0451 03/30/24  0324   WBC Thousand/uL 10.33* 11.01* 10.31*   HEMOGLOBIN g/dL 13.1 12.3 12.3   HEMATOCRIT % 41.2 38.8 39.0   PLATELETS Thousands/uL 197 199 186   NEUTROS PCT % 77*  --   --    MONOS PCT % 6  --   --    EOS PCT % 3  --   --       Results from last 7 days   Lab Units 04/01/24  1106 03/31/24  0451 03/30/24  0324   POTASSIUM mmol/L 3.6 3.8 3.8   CHLORIDE mmol/L 106 107 107   CO2 mmol/L 22 26 25   BUN mg/dL 14 16 15   CREATININE mg/dL 1.24 1.18 1.07   CALCIUM mg/dL 8.4 8.5 8.4     Results from last 7 days   Lab Units 03/31/24  0451 03/29/24  0525   MAGNESIUM mg/dL 2.1 2.2          Results from last 7 days   Lab Units 03/28/24  1034   INR  1.11   PTT seconds 26               IMAGING & DIAGNOSTIC TESTING     Radiology Results: I have personally reviewed pertinent reports.    No orders to display       Other Diagnostic Testing: I have personally reviewed pertinent reports.      ACTIVE MEDICATIONS     No current facility-administered medications for this encounter.       Prior to Admission medications    Medication Sig Start Date End Date Taking? Authorizing Provider   albuterol (2.5 mg/3 mL) 0.083 % nebulizer solution Take 3 mL (2.5 mg total) by nebulization every 6 (six) hours as needed for wheezing or shortness of breath 3/20/24 9/16/24  Makenna Hoffman,    amLODIPine (NORVASC) 5 mg tablet TAKE 1 TABLET (5 MG TOTAL) BY MOUTH DAILY. 3/4/24   Yoni Garcia PA-C   ammonium lactate (LAC-HYDRIN) 12 % cream Apply topically as needed for dry skin 12/29/23   Mary Sanchez DPM   aspirin 81 mg chewable tablet Chew 1 tablet (81 mg total) daily 9/8/23   Yoni SANTANA  JANNA Garcia   atorvastatin (LIPITOR) 80 mg tablet TAKE 1 TABLET (80 MG TOTAL) BY MOUTH DAILY AFTER DINNER 3/4/24   Yoni Garcia PA-C   baclofen 10 mg tablet Take 10 mg by mouth 3 (three) times a day    Historical Provider, MD   busPIRone (BUSPAR) 5 mg tablet Take 5 mg by mouth 2 (two) times a day 1/5/24   Historical Provider, MD   CALCIUM PO Take 1 tablet by mouth daily    Historical Provider, MD   carvedilol (COREG) 12.5 mg tablet TAKE 1 TABLET BY MOUTH TWICE A DAY WITH FOOD 1/8/24   Yoni Garcia PA-C   clopidogrel (PLAVIX) 75 mg tablet TAKE 1 TABLET BY MOUTH EVERY DAY 2/26/24   Yoni Garcia PA-C   Cyanocobalamin (VITAMIN B-12 PO) Take 1 tablet by mouth daily    Historical Provider, MD   docusate sodium (COLACE) 100 mg capsule Take 1 capsule (100 mg total) by mouth 2 (two) times a day 1/31/24   Amanda Dempsey PA-C   Empagliflozin (JARDIANCE) 10 MG TABS tablet Take 1 tablet (10 mg total) by mouth every morning 3/28/24   Jones Taylor MD   Fluticasone-Salmeterol (Advair Diskus) 500-50 mcg/dose inhaler Inhale 1 puff 2 (two) times a day Rinse mouth after use 3/5/24   JACLYN Ospina   folic acid (FOLVITE) 1 mg tablet Take 1 tablet (1 mg total) by mouth daily 3/5/24   JACLYN Ospina   furosemide (LASIX) 40 mg tablet Take once in AM daily. Take once daily in PM PRN weight gain, edema. 3/28/24   Jones Taylor MD   gabapentin (NEURONTIN) 800 mg tablet Take 800 mg by mouth 4 (four) times a day 1/9/20   Historical Provider, MD   hydrocortisone 1 % lotion Apply topically if needed for rash 3/5/24   JACLYN Ospina   Klor-Con M20 20 MEQ tablet TAKE 1 TABLET BY MOUTH EVERY DAY 2/23/24   Yoni Garcia PA-C   lidocaine (LIDODERM) 5 % Apply 1 patch topically daily back 8/21/23   Historical Provider, MD   losartan (COZAAR) 50 mg tablet TAKE 1 TABLET BY MOUTH EVERY DAY 12/26/23   Yoni Garcia PA-C   methocarbamol (ROBAXIN) 750 mg tablet TAKE 1 TABLET (750 MG TOTAL) BY MOUTH EVERY 6 (SIX)  HOURS AS NEEDED FOR MUSCLE SPASMS 1/3/24   JACLYN Ospina   naloxegol oxalate (MOVANTIK) 25 MG tablet Take 1 tablet (25 mg total) by mouth daily in the early morning 1/31/24   Amanda Dempsey PA-C   nitroglycerin (Nitrostat) 0.4 mg SL tablet Place 1 tablet (0.4 mg total) under the tongue every 5 (five) minutes as needed for chest pain (pt has not  used recently) 3/5/24   JACLYN Ospina   nystatin (MYCOSTATIN) cream Apply 2 g (1 Application total) topically 2 (two) times a day 3/5/24   JACLYN Ospina   omeprazole (PriLOSEC) 40 MG capsule Take 1 capsule (40 mg total) by mouth 2 (two) times a day before meals 9/27/23   Amanda Dempsey PA-C   ondansetron (ZOFRAN) 4 mg tablet Take 1 tablet (4 mg total) by mouth every 8 (eight) hours as needed for nausea or vomiting 9/19/23   JACLYN Ospina   predniSONE 10 mg tablet Take 2 a day for 1 week then 1 a day for 14 days 3/20/24   Makenna Hoffman DO   ranolazine (RANEXA) 1000 MG SR tablet TAKE 1 TABLET (1,000 MG TOTAL) BY MOUTH EVERY 12 HOURS 1/8/24   Yoni Garcia PA-C   tamsulosin (FLOMAX) 0.4 mg TAKE 1 CAPSULE BY MOUTH EVERY DAY WITH DINNER 3/23/24   JACLYN Ospina   traZODone (DESYREL) 100 mg tablet Take 1 tablet (100 mg total) by mouth daily at bedtime 3/31/24   Laura Watts DO   vitamin B-12 (VITAMIN B-12) 1,000 mcg tablet Take 1 tablet (1,000 mcg total) by mouth daily 3/5/24   JACLYN Ospina       CODE STATUS & ADVANCED DIRECTIVES     Code Status: Prior  Advance Directive and Living Will: Yes    Power of :    POLST:        VTE Pharmacologic Prophylaxis:  pt left    ======    I have discussed the patient's history, physical exam findings, assessment, and plan in detail with attending, Dr. Walsh    Thank you for allowing me to participate in the care of your patient, Aristeo Hall.    María Maldonado DO  [facility] Internal Medicine, PGY-3

## 2024-04-01 NOTE — ED PROVIDER NOTES
History  Chief Complaint   Patient presents with    Post Diagnosis Test Re-Evaluation     Pt states he was diagnosed with a brain bleed while at  last month, was told to return for possible neurosurgery consult and recoiling of aneurysm. C/o balance issues and vomiting.      HPI  Patient is a 63-year-old male presenting for concerns of abnormal MRI findings.  Past medical history significant for prior CVA with chronic left-sided deficits, recent presentation [facility] with strokelike symptoms including right-sided deficits and facial droop.  Patient was contacted by Dr. Watts due to concerns of abnormal MRI finding, concerns that may needed intervention/recoiling of known VICKEY aneurysm..  Patient states that his symptoms have not worsened nor necessarily improved since discharge from [facility] on 3/31/2024.  Patient currently endorsing intermittent right-sided facial droop, dizziness/lightheadedness, balance issues and nausea/vomiting.  Prior to Admission Medications   Prescriptions Last Dose Informant Patient Reported? Taking?   CALCIUM PO  Self Yes No   Sig: Take 1 tablet by mouth daily   Cyanocobalamin (VITAMIN B-12 PO)  Self Yes No   Sig: Take 1 tablet by mouth daily   Empagliflozin (JARDIANCE) 10 MG TABS tablet   No No   Sig: Take 1 tablet (10 mg total) by mouth every morning   Fluticasone-Salmeterol (Advair Diskus) 500-50 mcg/dose inhaler  Self No No   Sig: Inhale 1 puff 2 (two) times a day Rinse mouth after use   Klor-Con M20 20 MEQ tablet  Self No No   Sig: TAKE 1 TABLET BY MOUTH EVERY DAY   albuterol (2.5 mg/3 mL) 0.083 % nebulizer solution  Self No No   Sig: Take 3 mL (2.5 mg total) by nebulization every 6 (six) hours as needed for wheezing or shortness of breath   amLODIPine (NORVASC) 5 mg tablet  Self No No   Sig: TAKE 1 TABLET (5 MG TOTAL) BY MOUTH DAILY.   ammonium lactate (LAC-HYDRIN) 12 % cream  Self No No   Sig: Apply topically as needed for dry skin   aspirin 81 mg chewable tablet   Self No No   Sig: Chew 1 tablet (81 mg total) daily   atorvastatin (LIPITOR) 80 mg tablet  Self No No   Sig: TAKE 1 TABLET (80 MG TOTAL) BY MOUTH DAILY AFTER DINNER   baclofen 10 mg tablet  Self Yes No   Sig: Take 10 mg by mouth 3 (three) times a day   busPIRone (BUSPAR) 5 mg tablet  Self Yes No   Sig: Take 5 mg by mouth 2 (two) times a day   carvedilol (COREG) 12.5 mg tablet  Self No No   Sig: TAKE 1 TABLET BY MOUTH TWICE A DAY WITH FOOD   clopidogrel (PLAVIX) 75 mg tablet  Self No No   Sig: TAKE 1 TABLET BY MOUTH EVERY DAY   docusate sodium (COLACE) 100 mg capsule  Self No No   Sig: Take 1 capsule (100 mg total) by mouth 2 (two) times a day   folic acid (FOLVITE) 1 mg tablet  Self No No   Sig: Take 1 tablet (1 mg total) by mouth daily   furosemide (LASIX) 40 mg tablet   No No   Sig: Take once in AM daily. Take once daily in PM PRN weight gain, edema.   gabapentin (NEURONTIN) 800 mg tablet  Self Yes No   Sig: Take 800 mg by mouth 4 (four) times a day   hydrocortisone 1 % lotion  Self No No   Sig: Apply topically if needed for rash   lidocaine (LIDODERM) 5 %  Self Yes No   Sig: Apply 1 patch topically daily back   losartan (COZAAR) 50 mg tablet  Self No No   Sig: TAKE 1 TABLET BY MOUTH EVERY DAY   methocarbamol (ROBAXIN) 750 mg tablet  Self No No   Sig: TAKE 1 TABLET (750 MG TOTAL) BY MOUTH EVERY 6 (SIX) HOURS AS NEEDED FOR MUSCLE SPASMS   naloxegol oxalate (MOVANTIK) 25 MG tablet  Self No No   Sig: Take 1 tablet (25 mg total) by mouth daily in the early morning   nitroglycerin (Nitrostat) 0.4 mg SL tablet  Self No No   Sig: Place 1 tablet (0.4 mg total) under the tongue every 5 (five) minutes as needed for chest pain (pt has not  used recently)   nystatin (MYCOSTATIN) cream  Self No No   Sig: Apply 2 g (1 Application total) topically 2 (two) times a day   omeprazole (PriLOSEC) 40 MG capsule  Self No No   Sig: Take 1 capsule (40 mg total) by mouth 2 (two) times a day before meals   ondansetron (ZOFRAN) 4 mg tablet   Self No No   Sig: Take 1 tablet (4 mg total) by mouth every 8 (eight) hours as needed for nausea or vomiting   predniSONE 10 mg tablet  Self No No   Sig: Take 2 a day for 1 week then 1 a day for 14 days   ranolazine (RANEXA) 1000 MG SR tablet  Self No No   Sig: TAKE 1 TABLET (1,000 MG TOTAL) BY MOUTH EVERY 12 HOURS   tamsulosin (FLOMAX) 0.4 mg  Self No No   Sig: TAKE 1 CAPSULE BY MOUTH EVERY DAY WITH DINNER   traZODone (DESYREL) 100 mg tablet   No No   Sig: Take 1 tablet (100 mg total) by mouth daily at bedtime   vitamin B-12 (VITAMIN B-12) 1,000 mcg tablet  Self No No   Sig: Take 1 tablet (1,000 mcg total) by mouth daily      Facility-Administered Medications: None       Past Medical History:   Diagnosis Date    Acute on chronic diastolic congestive heart failure (HCC)     Altered gait     Alzheimer disease ([facility])     per patients,,early onset     Angina pectoris ([facility])     Anxiety     Arthritis     Brain aneurysm     coils placed    Cardiac disease     Chest pain 01/13/2016    Chronic kidney disease     Chronic pain     back/ right groin and rle- has morphine pump    Constipation     COPD (chronic obstructive pulmonary disease) ([facility])     Coronary artery disease     CPAP (continuous positive airway pressure) dependence     Decubital ulcer     sacral decub-occured 5/2019-sees wound care/debide in OR today 6/6/2019    Dependent on walker for ambulation     w/c for long distance    Depression     Diabetes mellitus ([facility])     insulin dependent    Dizziness     occ    Dysphagia     Enlarged prostate     Esophageal stricture In file    Esophageal varices ([facility])     Fall     Fall at home 05/03/2019    GERD (gastroesophageal reflux disease)     Heart failure ([facility])     Hiatal hernia     Hx of gastric bypass 11/19/2018    Hypercholesterolemia     Hypertension     MI (myocardial infarction) ([facility])     2017- stents x2    Migraine     Morbid obesity ([facility])     gastric bypass sleeve 11/2018-wt loss 125 lb    Neuropathy     Oxygen  dependent     Q HS  2LPM with CPAP and prn during day 2-3 LPM     Pressure injury of skin     Pressure injury of skin of sacral region 06/03/2019    Added automatically from request for surgery [id number]    Renal disorder     Shortness of breath     Skin abnormality     sacral wound - covered with pad    Sleep apnea     Stented coronary artery     Stroke (HCC)     vision loss b/l  2005, residual R leg weakness    Type 2 diabetes mellitus with diabetic neuropathy, with long-term current use of insulin ([facility]) 10/18/2019    Type 2 diabetes mellitus with renal complication ([facility])     insulin dependent    Urinary frequency     Use of cane as ambulatory aid     Wears dentures     Wears glasses     Wears glasses     Wheelchair dependent        Past Surgical History:   Procedure Laterality Date    BACK SURGERY      BRAIN SURGERY      CARDIAC CATHETERIZATION      with stents    CARDIAC CATHETERIZATION N/A 8/18/2023    Procedure: Cardiac Coronary Angiogram;  Surgeon: Horacio Weiner MD;  Location: [facility];  Service: Cardiology    CEREBRAL ANEURYSM REPAIR      with coils    COLONOSCOPY      ESOPHAGOGASTRODUODENOSCOPY N/A 7/1/2016    Procedure: ESOPHAGOGASTRODUODENOSCOPY (EGD);  Surgeon: Reno Christiansen MD;  Location: [facility];  Service:     GASTRIC BYPASS  11/19/2018    HERNIA REPAIR      HIATAL HERNIA REPAIR      INFUSION PUMP IMPLANTATION Left     morphine    INTRATHECAL PUMP IMPLANTATION Left 7/9/2020    Procedure: REVISION INTRATHECAL PAIN PUMP POCKET, LEFT ABDOMEN;  Surgeon: Homero Cho MD;  Location: [facility];  Service: Neurosurgery    KNEE ARTHROSCOPY Right     KNEE ARTHROSCOPY Right     PERONEAL NERVE DECOMPRESSION Right     HI DEBRIDEMENT OPEN WOUND FIRST 20 SQ CM/< N/A 6/6/2019    Procedure: EXCISIONAL DEBRIDEMENT OF SACRAL DECUBITUS ULCER;  Surgeon: Siddhartha Omer MD;  Location: [facility];  Service: General    HI ESOPHAGOGASTRODUODENOSCOPY TRANSORAL DIAGNOSTIC N/A 2/27/2017    Procedure:  ESOPHAGOGASTRODUODENOSCOPY (EGD);  Surgeon: Reno Christiansen MD;  Location: [facility];  Service: Gastroenterology    NE ESOPHAGOGASTRODUODENOSCOPY TRANSORAL DIAGNOSTIC N/A 8/23/2018    Procedure: ESOPHAGOGASTRODUODENOSCOPY (EGD) with biopsy;  Surgeon: Reno Christiansen MD;  [facility]: [facility];  Service: Gastroenterology    NE IMPLTJ/RPLCMT ITHCL/EDRL DRUG NFS PRGRBL PUMP Left 10/13/2020    Procedure: EXPLORATION OF INTRATHECAL PAIN PUMP SYSTEM INTEGRITY FOR POSSIBLE REPLACEMENT OF CATHETER AND PUMP.;  Surgeon: Homero Cho MD;  Location: [facility];  Service: Neurosurgery    NE IMPLTJ/RPLCMT ITHCL/EDRL DRUG NFS SUBQ RSVR N/A 1/19/2017    Procedure: REMOVAL / EXCHANGE INTRATHECAL PAIN PUMP;  Surgeon: Que Leonard MD;  [facility];  Service: Orthopedics    NE IMPLTJ/RPLCMT ITHCL/EDRL DRUG NFS SUBQ RSVR N/A 5/16/2016    Procedure: REPLACEMENT AND PROGRAM PUMP ;  Surgeon: Que Leonard MD;  [facility];  Service: Orthopedics    NE PRQ IMPLTJ NSTIM ELECTRODE ARRAY EPIDURAL Right 3/17/2021    Procedure: INSERTION THORACIC DORSAL COLUMN SPINAL CORD STIMULATOR PERCUTANEOUS W IMPLANTABLE PULSE GENERATOR, RIGHT;  Surgeon: Homero Cho MD;  Location[facility];  Service: Neurosurgery       Family History   Problem Relation Age of Onset    Diabetes unspecified Mother     Diabetes Mother     Heart attack Father     Heart disease Father     Hypertension Father     Stroke Father     Diabetes unspecified Brother     Depression Brother     Mental illness Brother     Diabetes unspecified Brother     Diabetes unspecified Maternal Grandmother     Diabetes unspecified Paternal Grandmother     Diabetes unspecified Paternal Uncle     ADD / ADHD Cousin     Colon cancer Neg Hx     Colon polyps Neg Hx      I have reviewed and agree with the history as documented.    E-Cigarette/Vaping    E-Cigarette Use Never User      E-Cigarette/Vaping Substances    Nicotine No     THC No     CBD No     Flavoring No     Other No     Unknown  No      Social History     Tobacco Use    Smoking status: Never    Smokeless tobacco: Never   Vaping Use    Vaping status: Never Used   Substance Use Topics    Alcohol use: Never    Drug use: Never     Types: Marijuana        Review of Systems   Constitutional: Negative.    HENT: Negative.     Eyes: Negative.    Respiratory: Negative.     Cardiovascular: Negative.    Gastrointestinal:  Positive for nausea and vomiting.   Endocrine: Negative.    Genitourinary: Negative.    Musculoskeletal: Negative.    Skin: Negative.    Allergic/Immunologic: Negative.    Neurological:  Positive for dizziness, facial asymmetry and light-headedness.   Hematological: Negative.    Psychiatric/Behavioral: Negative.         Physical Exam  ED Triage Vitals [04/01/24 1005]   Temperature Pulse Respirations Blood Pressure SpO2   97.6 °F (36.4 °C) 74 18 (!) 178/80 95 %      Temp src Heart Rate Source Patient Position - Orthostatic VS BP Location FiO2 (%)   -- -- -- -- --      Pain Score       --             Orthostatic Vital Signs  Vitals:    04/01/24 1005   BP: (!) 178/80   Pulse: 74       Physical Exam  Vitals and nursing note reviewed.   Constitutional:       Appearance: Normal appearance. He is normal weight.   HENT:      Head: Normocephalic and atraumatic.      Right Ear: Tympanic membrane, ear canal and external ear normal.      Left Ear: Tympanic membrane, ear canal and external ear normal.      Nose: Nose normal.      Mouth/Throat:      Mouth: Mucous membranes are moist.      Pharynx: Oropharynx is clear.   Eyes:      Extraocular Movements: Extraocular movements intact.      Conjunctiva/sclera: Conjunctivae normal.      Pupils: Pupils are equal, round, and reactive to light.   Cardiovascular:      Rate and Rhythm: Normal rate and regular rhythm.      Pulses: Normal pulses.      Heart sounds: Normal heart sounds.   Pulmonary:      Effort: Pulmonary effort is normal.      Breath sounds: Normal breath sounds.   Abdominal:      General:  Abdomen is flat. Bowel sounds are normal.      Palpations: Abdomen is soft.   Musculoskeletal:      Cervical back: Normal range of motion and neck supple.   Skin:     General: Skin is warm.      Capillary Refill: Capillary refill takes less than 2 seconds.   Neurological:      Mental Status: He is alert and oriented to person, place, and time. Mental status is at baseline.      Comments: Patient has no appreciable facial droop.  Patient's face appears symmetric, symmetric smile.  Patient has no appreciable focal cranial neurologic deficits.  Patient does have mild right-sided weakness however no appreciable left-sided weakness.  5 out of 5 strength on the left side.  Patient ambulates with walker at baseline.  Was able to ambulate without difficulty.         ED Medications  Medications - No data to display    Diagnostic Studies  Results Reviewed       Procedure Component Value Units Date/Time    Basic metabolic panel [[id number]]  (Abnormal) Collected: 04/01/24 1106    Lab Status: Final result Specimen: Blood from Arm, Left Updated: 04/01/24 1133     Sodium 137 mmol/L      Potassium 3.6 mmol/L      Chloride 106 mmol/L      CO2 22 mmol/L      ANION GAP 9 mmol/L      BUN 14 mg/dL      Creatinine 1.24 mg/dL      Glucose 143 mg/dL      Calcium 8.4 mg/dL      eGFR 62 ml/min/1.73sq m     Narrative:      National Kidney Disease Foundation guidelines for Chronic Kidney Disease (CKD):     Stage 1 with normal or high GFR (GFR > 90 mL/min/1.73 square meters)    Stage 2 Mild CKD (GFR = 60-89 mL/min/1.73 square meters)    Stage 3A Moderate CKD (GFR = 45-59 mL/min/1.73 square meters)    Stage 3B Moderate CKD (GFR = 30-44 mL/min/1.73 square meters)    Stage 4 Severe CKD (GFR = 15-29 mL/min/1.73 square meters)    Stage 5 End Stage CKD (GFR <15 mL/min/1.73 square meters)  Note: GFR calculation is accurate only with a steady state creatinine    CBC and differential [[id number]]  (Abnormal) Collected: 04/01/24 1106    Lab Status: Final  result Specimen: Blood from Arm, Left Updated: 04/01/24 1117     WBC 10.33 Thousand/uL      RBC 4.34 Million/uL      Hemoglobin 13.1 g/dL      Hematocrit 41.2 %      MCV 95 fL      MCH 30.2 pg      MCHC 31.8 g/dL      RDW 13.3 %      MPV 10.3 fL      Platelets 197 Thousands/uL      nRBC 0 /100 WBCs      Neutrophils Relative 77 %      Immature Grans % 1 %      Lymphocytes Relative 13 %      Monocytes Relative 6 %      Eosinophils Relative 3 %      Basophils Relative 0 %      Neutrophils Absolute 7.91 Thousands/µL      Absolute Immature Grans 0.09 Thousand/uL      Absolute Lymphocytes 1.36 Thousands/µL      Absolute Monocytes 0.60 Thousand/µL      Eosinophils Absolute 0.33 Thousand/µL      Basophils Absolute 0.04 Thousands/µL                    No orders to display         Procedures  Procedures      ED Course                                       Medical Decision Making  Patient 63-year-old male presenting for abnormal MRI findings in setting of nausea/vomiting, dizziness, balance issues, intermittent right-sided facial droop.  DDx: Stroke progression, possibly secondary to known VICKEY aneurysm, abnormal MRI findings  After discussion with patient, reached out to neurosurgery for concern of abnormal MRI findings, obtain baseline lab work at that time.  After discussion with neurosurgery who came and saw and evaluated the patient, they do not believe that his symptoms were secondary to the aneurysm and discussed/scheduled further outpatient testing for possible intervention.  Reached out to neurology who then said they would come down and assessed the patient.  Patient was becoming annoyed with the wait between consults, reached out to neurology again as patient threatened to leave within 30 minutes unless he is seen by neurology.  Neurology eventually came down to evaluate the patient.  However after neurology's evaluation, patient would not wait for any further discussion between neurology and the EM team and left  prior to receiving any discharge instructions.    Problems Addressed:  Abnormal MRI of head: acute illness or injury  Stroke-like symptoms: acute illness or injury    Amount and/or Complexity of Data Reviewed  Labs: ordered.          Disposition  Final diagnoses:   Abnormal MRI of head   Stroke-like symptoms     Time reflects when diagnosis was documented in both MDM as applicable and the Disposition within this note       Time User Action Codes Description Comment    4/1/2024 12:06 PM Terry Zimmerman Add [R93.0] Abnormal MRI of head     4/1/2024  3:15 PM Terry Zimmerman Modify [R93.0] Abnormal MRI of head     4/1/2024  3:15 PM Terry Zimmerman Add [R29.90] Stroke-like symptoms           ED Disposition       ED Disposition   Discharge    Condition   Stable    Date/Time   Mon Apr 1, 2024  6:49 PM    Comment   Aristeo Hall discharge to home/self care.                   Follow-up Information       Follow up With Specialties Details Why Contact Info Additional Information    [facility] Emergency Department Emergency Medicine Go to  If symptoms worsen [address] [place] [address]  [phone] [facility] Emergency Department, [address], [place], [address]   [phone]    [facility], Nurse Practitioner Go to  If symptoms worsen [address] [place] [address]  [phone]               Discharge Medication List as of 4/1/2024  5:14 PM        CONTINUE these medications which have NOT CHANGED    Details   albuterol (2.5 mg/3 mL) 0.083 % nebulizer solution Take 3 mL (2.5 mg total) by nebulization every 6 (six) hours as needed for wheezing or shortness of breath, Starting Wed 3/20/2024, Until Mon 9/16/2024 at 2359, Normal      amLODIPine (NORVASC) 5 mg tablet TAKE 1 TABLET (5 MG TOTAL) BY MOUTH DAILY., Starting Mon 3/4/2024, Normal      ammonium lactate (LAC-HYDRIN) 12 % cream Apply topically as needed  for dry skin, Starting Fri 12/29/2023, Normal      aspirin 81 mg chewable tablet Chew 1 tablet (81 mg total) daily, Starting Fri 9/8/2023, Normal      atorvastatin (LIPITOR) 80 mg tablet TAKE 1 TABLET (80 MG TOTAL) BY MOUTH DAILY AFTER DINNER, Starting Mon 3/4/2024, Normal      baclofen 10 mg tablet Take 10 mg by mouth 3 (three) times a day, Historical Med      busPIRone (BUSPAR) 5 mg tablet Take 5 mg by mouth 2 (two) times a day, Starting Fri 1/5/2024, Historical Med      CALCIUM PO Take 1 tablet by mouth daily, Historical Med      carvedilol (COREG) 12.5 mg tablet TAKE 1 TABLET BY MOUTH TWICE A DAY WITH FOOD, Starting Mon 1/8/2024, Normal      clopidogrel (PLAVIX) 75 mg tablet TAKE 1 TABLET BY MOUTH EVERY DAY, Starting Mon 2/26/2024, Normal      !! Cyanocobalamin (VITAMIN B-12 PO) Take 1 tablet by mouth daily, Historical Med      docusate sodium (COLACE) 100 mg capsule Take 1 capsule (100 mg total) by mouth 2 (two) times a day, Starting Wed 1/31/2024, Normal      Empagliflozin (JARDIANCE) 10 MG TABS tablet Take 1 tablet (10 mg total) by mouth every morning, Starting Thu 3/28/2024, Normal      Fluticasone-Salmeterol (Advair Diskus) 500-50 mcg/dose inhaler Inhale 1 puff 2 (two) times a day Rinse mouth after use, Starting Tue 3/5/2024, Normal      folic acid (FOLVITE) 1 mg tablet Take 1 tablet (1 mg total) by mouth daily, Starting Tue 3/5/2024, Normal      furosemide (LASIX) 40 mg tablet Take once in AM daily. Take once daily in PM PRN weight gain, edema., Normal      gabapentin (NEURONTIN) 800 mg tablet Take 800 mg by mouth 4 (four) times a day, Starting Thu 1/9/2020, Historical Med      hydrocortisone 1 % lotion Apply topically if needed for rash, Starting Tue 3/5/2024, Normal      Klor-Con M20 20 MEQ tablet TAKE 1 TABLET BY MOUTH EVERY DAY, Starting Fri 2/23/2024, Normal      lidocaine (LIDODERM) 5 % Apply 1 patch topically daily back, Starting Mon 8/21/2023, Historical Med      losartan (COZAAR) 50 mg tablet TAKE  1 TABLET BY MOUTH EVERY DAY, Starting Tue 12/26/2023, Normal      methocarbamol (ROBAXIN) 750 mg tablet TAKE 1 TABLET (750 MG TOTAL) BY MOUTH EVERY 6 (SIX) HOURS AS NEEDED FOR MUSCLE SPASMS, Starting Wed 1/3/2024, Normal      naloxegol oxalate (MOVANTIK) 25 MG tablet Take 1 tablet (25 mg total) by mouth daily in the early morning, Starting Wed 1/31/2024, Normal      nitroglycerin (Nitrostat) 0.4 mg SL tablet Place 1 tablet (0.4 mg total) under the tongue every 5 (five) minutes as needed for chest pain (pt has not  used recently), Starting Tue 3/5/2024, Normal      nystatin (MYCOSTATIN) cream Apply 2 g (1 Application total) topically 2 (two) times a day, Starting Tue 3/5/2024, Normal      omeprazole (PriLOSEC) 40 MG capsule Take 1 capsule (40 mg total) by mouth 2 (two) times a day before meals, Starting Wed 9/27/2023, Normal      ondansetron (ZOFRAN) 4 mg tablet Take 1 tablet (4 mg total) by mouth every 8 (eight) hours as needed for nausea or vomiting, Starting Tue 9/19/2023, Normal      predniSONE 10 mg tablet Take 2 a day for 1 week then 1 a day for 14 days, Normal      ranolazine (RANEXA) 1000 MG SR tablet TAKE 1 TABLET (1,000 MG TOTAL) BY MOUTH EVERY 12 HOURS, Normal      tamsulosin (FLOMAX) 0.4 mg TAKE 1 CAPSULE BY MOUTH EVERY DAY WITH DINNER, Starting Sat 3/23/2024, Normal      traZODone (DESYREL) 100 mg tablet Take 1 tablet (100 mg total) by mouth daily at bedtime, Starting Sun 3/31/2024, Normal      !! vitamin B-12 (VITAMIN B-12) 1,000 mcg tablet Take 1 tablet (1,000 mcg total) by mouth daily, Starting Tue 3/5/2024, Normal       !! - Potential duplicate medications found. Please discuss with provider.        No discharge procedures on file.    PDMP Review         Value Time User    PDMP Reviewed  Yes 8/23/2023  6:08 PM Juan Jose Grace MD             ED Provider  Attending physically available and evaluated Aristeo ATWOOD Rafael. I managed the patient along with the ED Attending.    Electronically Signed  by           Terry Zimmerman MD  04/01/24 3739

## 2024-04-01 NOTE — ASSESSMENT & PLAN NOTE
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  On my eval today, pt with old peripheral visual defect, unchanged. Reports \"blurry\" vision. Right scalp and V3 distribution slightly different sensation than left. Right arm weak strength, but may have been limited by effort.     Imaging: 3/31 MRA: Redemonstrated coil embolization of anterior communicating artery aneurysm. Increased size of 4 mm focal enhancement along the posteromedial margin of the coil mass suspicious for flow in the base of the aneurysm. Recommend further assessment with   catheter angiogram.  Patent major vessels of the [place] without high-grade stenosis.    Impression: possible MRI negative stroke     Plan:   Continue DAPT- confirm patient's compliance  No need for further imaging at this time  Neurosurgery eval appreciated- no need for acute intervention at this time, outpatient visit has been rescheduled to 4/15  "

## 2024-04-01 NOTE — TELEPHONE ENCOUNTER
4/3/24 - PT DISCHARGED TO HOME. CALLED AND SPOKE TO PT CONFIRMING APPT IN [place].  4/15/24 SNPX OR SOLO W/EM 2 WK HFU W/ 3/31/24 MRA HEAD    4/1/24 - PT READMITTED TO [facility] ED  [facility],  I saw this patient today as he came into the ER with concerns.  No acute intervention is indicated at this time.  However the patient is asking that his follow-up ointment be moved up which I think is reasonable.  Can we please schedule this patient for a follow-up appointment with Dr. Avendano 2 weeks?  Appointment can be with Dr. Romana vickers for a joint appointment with Dr. Avendano and an AP.  He already has all the required imaging completed for this appointment.  Thank you,  Vidhya  4/15/24 SNPX OR SOLO W/EM 2 WK HFU W/MRA HEAD    4/1/24 - PT DISCHARGED TO HOME. CALLED AND SPOKE TO PT CONFIRMING APPT  5/8/24 SNPX OR SOLO W/EM 1 MO HFU W/MRA HEAD    JANNA Jones [facility],  Can you please schedule this patient for follow-up with Dr. Avendano (either solo or SNPX) within 1 month with a repeat MRA head?  Thank you,  Vidhya

## 2024-04-01 NOTE — ASSESSMENT & PLAN NOTE
"Known residual ACOM aneurysm s/p coil embo   Hx of incidentally found ACOM aneurysm s/p coil embolization in 2004 at [facility]   Pt no longer following at [facility] in this regard  Has not been seen in \"years\" about this   Pt presented to [facility] on 3/28 with R sided weakness, numbness and R facial droop. He also reported dizziness and a sense of imbalance   MRI was neg for acute stroke, revealed chronic posterior circulation strokes   Pt was discharged yesterday on ASA and Plavix with plan to continue for at least 3 months and follow-up in the neurology office within 4-6 weeks   Pt was noted to have concern for residual aneurysm on imaging at OSH. Nsgy team was made aware over the weekend of this finding and advised the pt obtain an MRA head and follow-up as an outpt   Appt scheduled for May 8, 2024 with Dr. Avendano in the office   MRA resulted after he was discharged, confirming this suspected residual aneurysm. Pt was called by his PCP and reports worsening dizziness and imbalance on the phone so he was sent to the ER at [facility] for nsgy evaluation   Today, pt reports improving R facial droop but stable/worsening R sided weakness with worsening dizziness and sense of imbalance     Imaging:   3/31 MRA head w/wo: Redemonstrated coil embolization of anterior communicating artery aneurysm. Increased size of 4 mm focal enhancement along the posteromedial margin of the coil mass suspicious for flow in the base of the aneurysm. Recommend further assessment with catheter angiogram. Patent major vessels of the [place] without high-grade stenosis.    Plan:   Continue to closely monitor neuro exam   Frequent neuro checks per primary team   Repeat STAT CTH with any acute decline in GCS > 2pts or more in 1hr   Maintain normotensive BP goals, SBP < 160   No acute neuro surgical intervention indicated at this time   Case and imaging reviewed with Dr. Avendano   MRI reviewed and does reveal known residual ACOM aneurysm.  This appears " largely unchanged in comparison to MRA completed in 2017, only slightly increased in size.   Pt prestenting sx with R sided facial droop, R weakness, and dizziness/balance difficulty are not explained by this small residual aneurysm.   Recommend ongoing outpt follow-up in regard to this known residual ACOM aneurysm as previously recommended   Will move appt up for pt to be seen within 2 weeks per pt's request  Now scheduled on 4/15   Recommend neurology consult at this time for workup of his worsening/persistent stroke like sx   Consider repeat MRI this admission given report of worsening sx   Continue ASA and Plavix   DVT ppx: marisol Brush for chem dvt ppx from a nsgy standpoint   Medical management per primary team   Pain control per primary team   PT/OT   Social work following for assistance with dispo once medically cleared     Neurosurgery will sign off at this time. Will plan to follow-up with the pt as scheduled in the  nsgy office on 5/8/2024 with Dr. Avendano. Please reach out with any further questions or concerns.

## 2024-04-01 NOTE — ED NOTES
"Pt stated \"if physician not at bedside in a half an hour he will be leaving.\" RN reached out to physician about pt request.       Ivette Sam  04/01/24 1429       Ievtte Sam  04/01/24 1430    "

## 2024-04-01 NOTE — ED ATTENDING ATTESTATION
4/1/2024  I, Lilly Oreilly MD, saw and evaluated the patient. I have discussed the patient with the resident/non-physician practitioner and agree with the resident's/non-physician practitioner's findings, Plan of Care, and MDM as documented in the resident's/non-physician practitioner's note, except where noted. All available labs and Radiology studies were reviewed.  I was present for key portions of any procedure(s) performed by the resident/non-physician practitioner and I was immediately available to provide assistance.       At this point I agree with the current assessment done in the Emergency Department.  I have conducted an independent evaluation of this patient a history and physical is as follows:    63-year-old gentleman with past medical history of coronary artery disease, COPD, diabetes mellitus, prior cerebral aneurysm with coiling, prior CVA, with recent hospitalization at [facility] with abnormal MR angiography.  Patient admitted at [facility] on 3/28 through 3/31 for right-sided facial droop and right-sided paresis, undergoing workup for stroke.  His MRI was negative for new stroke.  Patient was started on dual likely platelet therapy.  After being discharged to home, he received a phone call from the physician caring for him that the MRA of head with and without contrast revealed possible flow at the base of the previously coiled anterior communicating artery aneurysm.  Given this, patient was counseled to present for further evaluation.    On my exam today, patient is awake, alert, and oriented.  His vital signs reveal hypertension 178/80, rest of vital signs within normal limits.  Patient does have a subtle right-sided facial droop.  He is moving all extremities spontaneously otherwise.  His heart is regular rate and rhythm, his lungs are clear to auscultation.  Patient has a normal affect and is very pleasant.    Plan to consult neurosurgery for consideration of and timing of IR angiography  with neurointerventionalist.  Neurosurgery consultant recommended neurology consultation given persistent/worsening right-sided symptoms.  Awaiting recommendations from neurology.    I was notified by resident physician and by patient's nurse that patient left the emergency department after prolonged wait for consultant and for further recommendations.

## 2024-04-01 NOTE — CONSULTS
"[facility]  Consult  Name: Aristeo Hall 63 y.o. male I MRN: [id number]  Unit/Bed#: QCF I Date of Admission: 4/1/2024   Date of Service: 4/1/2024 I Hospital Day: 0    Inpatient consult to Neurosurgery  Consult performed by: Vidhya Mera PA-C  Consult ordered by: Lilly Oreilly MD        Assessment/Plan   Cerebral aneurysm  Assessment & Plan  Known residual ACOM aneurysm s/p coil embo   Hx of incidentally found ACOM aneurysm s/p coil embolization in 2004 at [facility]   Pt no longer following at [facility] in this regard  Has not been seen in \"years\" about this   Pt presented to [facility] on 3/28 with R sided weakness, numbness and R facial droop. He also reported dizziness and a sense of imbalance   MRI was neg for acute stroke, revealed chronic posterior circulation strokes   Pt was discharged yesterday on ASA and Plavix with plan to continue for at least 3 months and follow-up in the neurology office within 4-6 weeks   Pt was noted to have concern for residual aneurysm on imaging at OSH. Nsgy team was made aware over the weekend of this finding and advised the pt obtain an MRA head and follow-up as an outpt   Appt scheduled for May 8, 2024 with Dr. Avendano in the office   MRA resulted after he was discharged, confirming this suspected residual aneurysm. Pt was called by his PCP and reports worsening dizziness and imbalance on the phone so he was sent to the ER at [facility] for nsgy evaluation   Today, pt reports improving R facial droop but stable/worsening R sided weakness with worsening dizziness and sense of imbalance     Imaging:   3/31 MRA head w/wo: Redemonstrated coil embolization of anterior communicating artery aneurysm. Increased size of 4 mm focal enhancement along the posteromedial margin of the coil mass suspicious for flow in the base of the aneurysm. Recommend further assessment with catheter angiogram. Patent major vessels of the [place] without " high-grade stenosis.    Plan:   Continue to closely monitor neuro exam   Frequent neuro checks per primary team   Repeat STAT CTH with any acute decline in GCS > 2pts or more in 1hr   Maintain normotensive BP goals, SBP < 160   No acute neuro surgical intervention indicated at this time   Case and imaging reviewed with Dr. Avendano   MRI reviewed and does reveal known residual ACOM aneurysm.  This appears largely unchanged in comparison to MRA completed in 2017, only slightly increased in size.   Pt prestenting sx with R sided facial droop, R weakness, and dizziness/balance difficulty are not explained by this small residual aneurysm.   Recommend ongoing outpt follow-up in regard to this known residual ACOM aneurysm as previously recommended   Will move appt up for pt to be seen within 2 weeks per pt's request  Now scheduled on 4/15   Recommend neurology consult at this time for workup of his worsening/persistent stroke like sx   Consider repeat MRI this admission given report of worsening sx   Continue ASA and Plavix   DVT ppx: SCDs, okay for chem dvt ppx from a nsgy standpoint   Medical management per primary team   Pain control per primary team   PT/OT   Social work following for assistance with dispo once medically cleared     Neurosurgery will sign off at this time. Will plan to follow-up with the pt as scheduled in the  nsgy office on 5/8/2024 with Dr. Avendano. Please reach out with any further questions or concerns.            History of Present Illness   HPI: Aristeo Hall is a 63 y.o. year old male with PMH significant for prior stroke in 2004 with residual field deficit, chronic migraines, ARLEEN, obesity, CAD status post MI and 6 cardiac stent placements, COPD, hypertension, diabetes type 2, hyperlipidemia, CHF, prior gastric bypass surgery, chronic pain status post neurostimulator, and prior cerebral aneurysm status post coil embolization in 2004 at the [facility] in [place]  who presents to the [facility] ER today after his PCP referred him due to concern of known residual ACOM aneurysm and persistent strokelike symptoms.    Patient was initially admitted to the [facility] ED on 3/28 2024 with complaints of right facial droop and right-sided weakness as well as dizziness and a sense of imbalance.  Patient was admitted on the stroke pathway with a neurology consult.  MRI that admission was negative for acute stroke.  Patient was ultimately discharged on 3/31 on aspirin and Plavix with a loop recorder and advised close outpatient follow-up with neurology within 4 to 6 weeks.  The plan at that time was to continue DAPT for at least 3 months.  During this admission, imaging revealed concern for residual ACOM aneurysm status post prior coil embolization that had not previously been followed up for years from patient.  Our neurosurgery team was contacted and asked to review the case via telemedicine consult.  Advised for outpatient follow-up given the location of this aneurysm does not explain the patient's symptoms and previous imaging revealed only slight increase to this known residual aneurysm.  We recommended outpatient follow-up with the neurosurgery group with a repeat MRA head.  The patient had improvement in his symptoms at hospitalization.  The medicine team at [facility] ordered the MRA to be completed inpatient so the patient did not have record made for this to be done as an outpatient and to just follow-up in our office as scheduled.  The patient continued to have improvement by 3/31 and was ultimately discharged home.    The patient's PCP called and checked in on him yesterday evening/into this morning.  They reviewed the patient's MRI results with him that revealed a 4 mm residual aneurysm.  On the phone, the patient reported fluctuating symptoms where he stated his facial droop was better weakness on the R side was increasing and he noticed increased dizziness and a sense of imbalance.  The  "patient's PCP sent him to the ER for further evaluation and \"repeat angiogram with possible coiling\".     Of note, the patient does have chronic left leg weakness since a femoral nerve injury happened during his previous angiogram for coil embo in 2004 at [facility].  He chronically ambulates with a rolling walker.    Review of Systems      Historical Information   Past Medical History:   Diagnosis Date    Acute on chronic diastolic congestive heart failure (HCC)     Altered gait     Alzheimer disease ([facility])     per patients,,early onset     Angina pectoris ([facility])     Anxiety     Arthritis     Brain aneurysm     coils placed    Cardiac disease     Chest pain 01/13/2016    Chronic kidney disease     Chronic pain     back/ right groin and rle- has morphine pump    Constipation     COPD (chronic obstructive pulmonary disease) ([facility])     Coronary artery disease     CPAP (continuous positive airway pressure) dependence     Decubital ulcer     sacral decub-occured 5/2019-sees wound care/debide in OR today 6/6/2019    Dependent on walker for ambulation     w/c for long distance    Depression     Diabetes mellitus ([facility])     insulin dependent    Dizziness     occ    Dysphagia     Enlarged prostate     Esophageal stricture In file    Esophageal varices ([facility])     Fall     Fall at home 05/03/2019    GERD (gastroesophageal reflux disease)     Heart failure ([facility])     Hiatal hernia     Hx of gastric bypass 11/19/2018    Hypercholesterolemia     Hypertension     MI (myocardial infarction) ([facility])     2017- stents x2    Migraine     Morbid obesity ([facility])     gastric bypass sleeve 11/2018-wt loss 125 lb    Neuropathy     Oxygen dependent     Q HS  2LPM with CPAP and prn during day 2-3 LPM     Pressure injury of skin     Pressure injury of skin of sacral region 06/03/2019    Added automatically from request for surgery [id number]    Renal disorder     Shortness of breath     Skin abnormality     sacral wound - covered with pad    Sleep apnea     " Stented coronary artery     Stroke (HCC)     vision loss b/l  2005, residual R leg weakness    Type 2 diabetes mellitus with diabetic neuropathy, with long-term current use of insulin ([facility]) 10/18/2019    Type 2 diabetes mellitus with renal complication ([facility])     insulin dependent    Urinary frequency     Use of cane as ambulatory aid     Wears dentures     Wears glasses     Wears glasses     Wheelchair dependent      Past Surgical History:   Procedure Laterality Date    BACK SURGERY      BRAIN SURGERY      CARDIAC CATHETERIZATION      with stents    CARDIAC CATHETERIZATION N/A 8/18/2023    Procedure: Cardiac Coronary Angiogram;  Surgeon: Horacio Weiner MD;  Location: [facility] CARD[facility] C[facility];  Service: Cardiology    CEREBRAL ANEURYSM REPAIR      with coils    COLONOSCOPY      ESOPHAGOGASTRODUODENOSCOPY N/A 7/1/2016    Procedure: ESOPHAGOGASTRODUODENOSCOPY (EGD);  Surgeon: Reno Christiansen MD;  Location: [facility] GI LAB;  Service:     GASTRIC BYPASS  11/19/2018    HERNIA REPAIR      HIATAL HERNIA REPAIR      INFUSION PUMP IMPLANTATION Left     morphine    INTRATHECAL PUMP IMPLANTATION Left 7/9/2020    Procedure: REVISION INTRATHECAL PAIN PUMP POCKET, LEFT ABDOMEN;  Surgeon: Homero Cho MD;  Location: [facility] MAIN OR;  Service: Neurosurgery    KNEE ARTHROSCOPY Right     KNEE ARTHROSCOPY Right     PERONEAL NERVE DECOMPRESSION Right     VA DEBRIDEMENT OPEN WOUND FIRST 20 SQ CM/< N/A 6/6/2019    Procedure: EXCISIONAL DEBRIDEMENT OF SACRAL DECUBITUS ULCER;  Surgeon: Siddhartha Omer MD;  Location: [facility] Main OR;  Service: General    VA ESOPHAGOGASTRODUODENOSCOPY TRANSORAL DIAGNOSTIC N/A 2/27/2017    Procedure: ESOPHAGOGASTRODUODENOSCOPY (EGD);  Surgeon: Reno Christiansen MD;  Location: [facility] GI [facility];  Service: Gastroenterology    VA ESOPHAGOGASTRODUODENOSCOPY TRANSORAL DIAGNOSTIC N/A 8/23/2018    Procedure: ESOPHAGOGASTRODUODENOSCOPY (EGD) with biopsy;  Surgeon: Reno Christiansen MD;  Location: [facility];  Service: Gastroenterology    VA IMPLTJ/RPLCMT  ITHCL/EDRL DRUG NFS PRGRBL PUMP Left 10/13/2020    Procedure: EXPLORATION OF INTRATHECAL PAIN PUMP SYSTEM INTEGRITY FOR POSSIBLE REPLACEMENT OF CATHETER AND PUMP.;  Surgeon: Homero Cho MD;  Location: [facility];  Service: Neurosurgery    WI IMPLTJ/RPLCMT ITHCL/EDRL DRUG NFS SUBQ RSVR N/A 1/19/2017    Procedure: REMOVAL / EXCHANGE INTRATHECAL PAIN PUMP;  Surgeon: Que Leonard MD;  Location: [facility];  Service: Orthopedics    WI IMPLTJ/RPLCMT ITHCL/EDRL DRUG NFS SUBQ RSVR N/A 5/16/2016    Procedure: REPLACEMENT AND PROGRAM PUMP ;  Surgeon: Que Leonard MD;  Location: [facility];  Service: Orthopedics    WI PRQ IMPLTJ NSTIM ELECTRODE ARRAY EPIDURAL Right 3/17/2021    Procedure: INSERTION THORACIC DORSAL COLUMN SPINAL CORD STIMULATOR PERCUTANEOUS W IMPLANTABLE PULSE GENERATOR, RIGHT;  Surgeon: Homero Cho MD;  Location: [facility];  Service: Neurosurgery     Social History     Substance and Sexual Activity   Alcohol Use Never     Social History     Substance and Sexual Activity   Drug Use Never    Types: Marijuana     Social History     Tobacco Use   Smoking Status Never   Smokeless Tobacco Never     Family History   Problem Relation Age of Onset    Diabetes unspecified Mother     Diabetes Mother     Heart attack Father     Heart disease Father     Hypertension Father     Stroke Father     Diabetes unspecified Brother     Depression Brother     Mental illness Brother     Diabetes unspecified Brother     Diabetes unspecified Maternal Grandmother     Diabetes unspecified Paternal Grandmother     Diabetes unspecified Paternal Uncle     ADD / ADHD Cousin     Colon cancer Neg Hx     Colon polyps Neg Hx      Meds/Allergies   all current active meds have been reviewed  No Known Allergies    Objective   I/O       None          Physical Exam  Constitutional:       General: He is not in acute distress.     Appearance: He is obese. He is ill-appearing. He is not toxic-appearing.      Comments: Middle-age male appearing  older than stated age, obese, mildly agitated   HENT:      Head: Normocephalic and atraumatic.      Right Ear: External ear normal.      Left Ear: External ear normal.      Nose: Nose normal. No congestion or rhinorrhea.      Mouth/Throat:      Mouth: Mucous membranes are moist.   Eyes:      General: No scleral icterus.        Right eye: No discharge.         Left eye: No discharge.      Extraocular Movements: Extraocular movements intact.      Conjunctiva/sclera: Conjunctivae normal.      Pupils: Pupils are equal, round, and reactive to light.   Cardiovascular:      Rate and Rhythm: Normal rate.   Pulmonary:      Effort: Pulmonary effort is normal. No respiratory distress.      Comments: No respiratory distress on room air  Abdominal:      General: Abdomen is flat.   Musculoskeletal:         General: No deformity or signs of injury. Normal range of motion.      Cervical back: Normal range of motion and neck supple. No rigidity or tenderness.      Right lower leg: No edema.      Left lower leg: No edema.   Skin:     General: Skin is warm and dry.      Capillary Refill: Capillary refill takes less than 2 seconds.   Neurological:      Mental Status: He is alert and oriented to person, place, and time.      Cranial Nerves: Cranial nerve deficit present.      Sensory: No sensory deficit.      Motor: Weakness present.      Comments: GCS 15   A&Ox3   Appropriately answering questions and following commands   Mild dysarthria  Subtle right facial droop  Right-sided weakness  - RUE rated 3/5 throughout  - RLE with chronic weakness, unable to lift off the bed at baseline.  3/5 at the knee and ankle  -LUE and LLE intact  Decreased sensation to light touch in RUE and RLE  R sided drift noted      Psychiatric:         Mood and Affect: Mood normal.         Behavior: Behavior normal.         Thought Content: Thought content normal.         Judgment: Judgment normal.      Comments: Mild agitation, but appropriate       Neurologic  "Exam     Mental Status   Oriented to person, place, and time.     Cranial Nerves     CN III, IV, VI   Pupils are equal, round, and reactive to light.      Vitals:Blood pressure (!) 178/80, pulse 74, temperature 97.6 °F (36.4 °C), resp. rate 18, SpO2 95%.,There is no height or weight on file to calculate BMI.     Lab Results:   Results from last 7 days   Lab Units 04/01/24  1106 03/31/24  0451 03/30/24  0324   WBC Thousand/uL 10.33* 11.01* 10.31*   HEMOGLOBIN g/dL 13.1 12.3 12.3   HEMATOCRIT % 41.2 38.8 39.0   PLATELETS Thousands/uL 197 199 186   NEUTROS PCT % 77*  --   --    MONOS PCT % 6  --   --    EOS PCT % 3  --   --      Results from last 7 days   Lab Units 04/01/24  1106 03/31/24  0451 03/30/24  0324   POTASSIUM mmol/L 3.6 3.8 3.8   CHLORIDE mmol/L 106 107 107   CO2 mmol/L 22 26 25   BUN mg/dL 14 16 15   CREATININE mg/dL 1.24 1.18 1.07   CALCIUM mg/dL 8.4 8.5 8.4     Results from last 7 days   Lab Units 03/31/24  0451 03/29/24  0525   MAGNESIUM mg/dL 2.1 2.2         Results from last 7 days   Lab Units 03/28/24  1034   INR  1.11   PTT seconds 26     No results found for: \"TROPONINT\"  ABG:No results found for: \"PHART\", \"CZE4FSM\", \"PO2ART\", \"AIX2SHS\", \"N5VWGOKM\", \"BEART\", \"SOURCE\"    Imaging Studies: I have personally reviewed pertinent reports.   and I have personally reviewed pertinent films in PACS  XR chest pa & lateral    Result Date: 4/1/2024  Narrative: XR CHEST PA & LATERAL INDICATION: chf. COMPARISON: 8/23/2023 FINDINGS: Linear opacities at both lung bases likely subsegmental atelectasis has developed No pneumothorax or pleural effusion. Normal cardiomediastinal silhouette. Bones are unremarkable for age. Neurostimulator electrodes mid thoracic termination, again evident Normal upper abdomen.     Impression: Development of suspected subsegmental atelectasis at both lung bases Workstation performed: [id number]     MRA head w wo contrast    Result Date: 3/31/2024  Narrative: MRA BRAIN WITH AND WITHOUT " CONTRAST INDICATION:  Aneurysm evaluation COMPARISON: MRA brain 6/19/2017. TECHNIQUE:  Axial 3-D time-of-flight imaging with 3-D reconstructions performed with and without contrast.  Axial spoiled gradient 3-D post contrast in multiple phases. IV Contrast:  14 mL of Gadobutrol injection (SINGLE-DOSE) FINDINGS: IMAGE QUALITY:  Diagnostic. ANATOMY INTERNAL CAROTID ARTERIES:  Normal flow related enhancement of the distal cervical, petrous and cavernous segments of the internal carotid arteries.  Normal ICA terminus. ANTERIOR CIRCULATION: Redemonstrated prior coil embolization of anterior communicating artery aneurysm. There is 4 mm focal enhancement along the posterior medial margin of the coil mass has enlarged from prior MRA (series 304 image 75). Severely hypoplastic right A1 segment.  Normal anterior communicating artery.  Normal flow-related enhancement of the anterior cerebral arteries. MIDDLE CEREBRAL ARTERY CIRCULATION:  The M1 segment and middle cerebral artery branches demonstrate normal flow-related enhancement. DISTAL VERTEBRAL ARTERIES:  Distal vertebral arteries are patent with a normal vertebrobasilar junction. Normal right PICA origin. Left PICA origin is not seen with possible common origin AICA/PICA, similar to prior exam BASILAR ARTERY:  Normal. POSTERIOR CEREBRAL ARTERIES:  Both posterior cerebral arteries arises from the basilar tip.  Both arteries demonstrate normal flow-related enhancement.  Normal posterior communicating arteries.     Impression: 1.  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 Increased size of 4 mm focal enhancement along the posteromedial margin of the coil mass suspicious for flow in the base of the aneurysm. Recommend further assessment with catheter angiogram. 2.  Patent major vessels of the [place] without high-grade stenosis. The study was marked in EPIC for significant notification. Workstation performed: [id number]     Echo  complete w/ contrast if indicated    Result Date: 3/29/2024  Narrative:   Left Ventricle: Left ventricular cavity size is normal. Wall thickness is mildly increased. The left ventricular ejection fraction is 60% by biplane measurement. Systolic function is normal. Although no diagnostic regional wall motion abnormality was identified, this possibility cannot be completely excluded on the basis of this study. Diastolic function is normal.   Right Ventricle: Right ventricular cavity size is dilated. Systolic function is normal.   Right Atrium: The atrium is dilated.   Atrial Septum: No patent foramen ovale detected, confirmed by provocation with cough, using agitated saline contrast and with valsalva, using agitated saline contrast.   Aortic Valve: There is aortic valve sclerosis.   Aorta: The aortic root is normal in size. The ascending aorta is mildly dilated. The aortic root is 3.50 cm. The ascending aorta is 3.9 cm.     MRI brain w wo contrast    Result Date: 3/29/2024  Narrative: MRI BRAIN WITH AND WITHOUT CONTRAST INDICATION: stroke. Per epic chart review::Stroke-like symptoms.  63-year-old male with history of stroke in 2005 with residual visual field deficit, hx of cerebral aneurysm s/p coiling in 2003, chronic migraines, ARLEEN, CAD, COPD, chronic pain s/p spinal cord stimulator, HTN, DM2, dyslipidemia, h/o gastric bypass, CHF, Presented on 3/28 with right facial droop and right-sided weakness/numbness, present since evening of 3/27. Initial neuro exam with visual field deficit (inconsistent with confrontational testing but appeared to blink less to threat on right) diminished sensation R face, subtle R facial asymmetry, and RLE drift. Patient admits he has NOT been taking his ASA/Plavix on a regular basis. COMPARISON: CT stroke alert brain and CTA stroke alert head neck 3/28/2024. MRA head without contrast 6/19/2017. MRI brain neuro Quant with and without contrast 3/10/2017. MRI brain without contrast  11/20/2015. MRI brain plus MRA head without contrast 4/19/2006. MRI brain with and without contrast plus MRA head without contrast 2/15/2005. TECHNIQUE: Multiplanar, multisequence imaging of the brain was performed before and after gadolinium administration. IV Contrast:  14 mL of Gadobutrol injection (SINGLE-DOSE) IMAGE QUALITY:   Diagnostic. FINDINGS: BRAIN PARENCHYMA:  There is no discrete mass, mass effect or midline shift. No acute intracranial hemorrhage. No acute infarction. 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 A few small scattered hyperintensities on T2/FLAIR imaging are noted in the periventricular and subcortical white matter demonstrating an appearance that is statistically most likely to represent minimal microangiopathic change. Postcontrast imaging of the brain demonstrates no abnormal enhancement. VENTRICLES:  Normal for the patient's age. SELLA AND PITUITARY GLAND:  Normal. ORBITS:  Normal. PARANASAL SINUSES: Mild mucosal thickening in bilateral maxillary sinuses (left worse than right). 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 Difficult to evaluate for residual or recurrent aneurysm on this study. Otherwise, evaluation of the major intracranial vasculature demonstrates appropriate flow voids. Please see CTA stroke alert head and neck 3/28/2024 for further evaluation of the intracranial vessels. CALVARIUM AND SKULL BASE:  Normal. EXTRACRANIAL SOFT TISSUES:  Normal.     Impression: No acute intracranial abnormality or abnormal enhancement. 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 Difficult to evaluate for residual or recurrent aneurysm on this study. Recommend consultation with the [facility] of [facility]  [facility] at [phone]. Workstation performed: [id number]     XR follow up    Result Date: 3/29/2024  Narrative: Radiographs obtained demonstrates continuity of the spinal stimulator leads. Pain pump in the left lateral hip region in adequate orientation regarding the z-axis of the spine. Workstation performed: [id number]     CT stroke alert brain    Result Date: 3/28/2024  Narrative: CT BRAIN - STROKE ALERT PROTOCOL INDICATION:   Stroke Alert. COMPARISON: CT head from 1/21/2022, MRA of the head from 6/19/2017, MRI of the brain from 3/10/2017 TECHNIQUE:  CT examination of the brain was performed.  In addition to axial images, coronal reformatted images were created and submitted for interpretation. Radiation dose length product (DLP) for this visit:  923.62 mGy-cm .  This examination, like all CT scans performed in the [facility], was performed utilizing techniques to minimize radiation dose exposure, including the use of iterative  reconstruction and automated exposure control. IMAGE QUALITY:  Diagnostic. FINDINGS: PARENCHYMA: Sequelae of anterior communicating artery aneurysm coiling with streak artifact limiting evaluation of the bilateral anterior inferior frontal lobes, bilateral temporal lobes and upper brainstem. Within these limits no acute vascular territory infarct or  acute intracranial hemorrhage. Hypoattenuation within the periventricular and deep white matter, suggestive of mild microangiopathic changes in this age group. No intracranial mass, mass effect or midline shift. VENTRICLES AND EXTRA-AXIAL SPACES:  Normal for the patient's age. VISUALIZED ORBITS: Normal visualized orbits. PARANASAL SINUSES: Minimal mucosal thickening of the inferior maxillary sinuses. CALVARIUM AND EXTRACRANIAL SOFT TISSUES:   Normal.     Impression: -Within the limitations of streak artifact from anterior communicating artery aneurysm coiling no vascular territory infarct or  intracranial hemorrhage. 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 I personally communicated the findings via telephone with Cruz Patricio  at 11:05 a.m. on 3/28/2024. Workstation performed: [id number]     CTA stroke alert (head/neck)    Result Date: 3/28/2024  Narrative: CTA NECK AND BRAIN WITH CONTRAST INDICATION: Stroke Alert COMPARISON:   MRA of the head from 6/19/2017, CT of the cervical spine from 1/21/2022, CTA head and neck from 7/26/2005 TECHNIQUE:   Post contrast imaging was performed after administration of iodinated contrast through the neck and brain. Post contrast axial 0.625 mm images timed to opacify the arterial system. 3D rendering was performed on an independent workstation.   MIP reconstructions performed. Coronal reconstructions were performed of the noncontrast portion of the brain. Radiation dose length product (DLP) for this visit:  454.64 mGy-cm .  This examination, like all CT scans performed in the [facility], was performed utilizing techniques to minimize radiation dose exposure, including the use of iterative  reconstruction and automated exposure control. IV Contrast: IMAGE QUALITY:   Diagnostic FINDINGS: CERVICAL VASCULATURE AORTIC ARCH AND GREAT VESSELS: Mild atherosclerotic disease of the aortic arch. Normal proximal great vessels and visualized subclavian vessels.  No significant stenosis. RIGHT VERTEBRAL ARTERY CERVICAL SEGMENT:  Normal origin. The vessel is normal in caliber throughout the neck. LEFT VERTEBRAL ARTERY CERVICAL SEGMENT: Vertebral artery is occluded from its origin through the mid V2 segment where there is reconstitution of the hypoplastic distal V2 and V3 segments. RIGHT EXTRACRANIAL CAROTID SEGMENT: Mild atherosclerotic disease of the distal common carotid artery and proximal cervical internal carotid artery without significant stenosis compared to the more distal ICA. LEFT EXTRACRANIAL CAROTID SEGMENT: Mild atherosclerotic  disease of the distal common carotid artery and proximal cervical internal carotid artery without significant stenosis compared to the more distal ICA. NASCET criteria was used to determine the degree of internal carotid artery diameter stenosis. INTRACRANIAL VASCULATURE INTERNAL CAROTID ARTERIES: Atherosclerotic calcifications of the cavernous and clinoid segments of the ICAs. The left intracranial ICA is slightly larger than the right. No significant stenosis of the left intracranial ICA. Mild to moderate luminal narrowing of the right clinoid segment. Right supraclinoid segment and ICA terminus are not well evaluated due to streak artifact. ANTERIOR CIRCULATION: A1 segments of the ACAs are not evaluated due to streak artifact from anterior communicating artery aneurysm coiling. Redemonstrated enhancement along the medial margin of the coil possibly reflecting volume averaging from A2 segment of the anterior cerebral artery versus residual aneurysm, unchanged. Proximal A2 segments are not visualized, otherwise normal enhancement of the more distal VICKEY branches. MIDDLE CEREBRAL ARTERY CIRCULATION: Proximal right M1 segment of the MCA cannot is grossly patent however suboptimally evaluated due to streak artifact. Otherwise bilateral M1 segments and middle cerebral artery branches demonstrate normal enhancement. DISTAL VERTEBRAL ARTERIES: Atherosclerotic calcifications of bilateral intradural vertebral arteries which are patent. Moderate to severe stenosis of the proximal right and moderate stenosis of the proximal left intradural vertebral arteries.  Posterior inferior cerebellar artery origins are normal. BASILAR ARTERY: Streak artifact somewhat limits evaluation of the basilar tip, otherwise the basilar artery demonstrates normal enhancement.  Normal superior cerebellar arteries. POSTERIOR CEREBRAL ARTERIES: Both posterior cerebral arteries arises from the basilar tip.  Both arteries demonstrate normal  enhancement.   Suboptimal evaluation of bilateral posterior communicating arteries due to streak artifact. VENOUS STRUCTURES: The dural venous sinuses are patent. NON VASCULAR ANATOMY BONY STRUCTURES:  No acute osseous abnormality within the limitations of high image noise involving the lower cervical/upper thoracic spine.. SOFT TISSUES OF THE NECK:  Normal. THORACIC INLET: Secretions within the dependent trachea.     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 Possible enhancement along the medial margin of the coil mass which may represent volume averaging from A2 segment versus residual aneurysm, unchanged. MRA with and without contrast can be obtained for further evaluation. -Bilateral A1 segments, proximal A2 segments of the ACAs, right ICA terminus is not well evaluated due to streak artifact. -Moderate to severe right and moderate left stenosis of the proximal intradural vertebral arteries due to atherosclerosis. 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 There is distal reconstitution of the hypoplastic vertebral artery. -Otherwise no high-grade stenosis, dissection or aneurysm involving the carotid or right vertebral arteries I personally communicated the findings via telephone with Cruz Curry  at 11:05 a.m. on 3/28/2024. Workstation performed: [id number]     POCT spirometry    Impression: Difficult to interpret but FEV1 was only 12% likely severe obstructive lung defect    EKG, Pathology, and Other Studies: I have personally reviewed pertinent reports.   and I have personally reviewed pertinent films in PACS    VTE Prophylaxis: Sequential compression device (Venodyne) , okay for chem dvt ppx from a nsgy standpoint     Code Status: Prior  Advance Directive and Living Will: Yes    Power of :    POLST:      Counseling / Coordination of Care  I spent 45 minutes with the patient.

## 2024-04-02 ENCOUNTER — VBI (OUTPATIENT)
Dept: FAMILY MEDICINE CLINIC | Facility: HOSPITAL | Age: 64
End: 2024-04-02

## 2024-04-02 DIAGNOSIS — Z71.89 COMPLEX CARE COORDINATION: Primary | ICD-10-CM

## 2024-04-02 NOTE — TELEPHONE ENCOUNTER
04/02/24 9:45 AM    Patient contacted post ED visit, VBI department spoke with patient/caregiver and outreach was successful.    Thank you.  BENIGNO ERVIN  [facility]

## 2024-04-02 NOTE — TELEPHONE ENCOUNTER
04/02/24 9:38 AM    Patient contacted post ED visit, first outreach attempt made. Message was left for patient to return a call to the VBI Department at [facility]: Phone [phone].    Thank you.  BENIGNO ERVIN  [facility]

## 2024-04-03 ENCOUNTER — OFFICE VISIT (OUTPATIENT)
Dept: BARIATRICS | Facility: CLINIC | Age: 64
End: 2024-04-03
Payer: COMMERCIAL

## 2024-04-03 ENCOUNTER — PATIENT OUTREACH (OUTPATIENT)
Dept: FAMILY MEDICINE CLINIC | Facility: HOSPITAL | Age: 64
End: 2024-04-03

## 2024-04-03 VITALS
TEMPERATURE: 97.6 F | HEIGHT: 74 IN | WEIGHT: 313.5 LBS | HEART RATE: 65 BPM | DIASTOLIC BLOOD PRESSURE: 84 MMHG | SYSTOLIC BLOOD PRESSURE: 140 MMHG | BODY MASS INDEX: 40.23 KG/M2

## 2024-04-03 DIAGNOSIS — K21.9 GERD (GASTROESOPHAGEAL REFLUX DISEASE): ICD-10-CM

## 2024-04-03 DIAGNOSIS — Z48.815 ENCOUNTER FOR SURGICAL AFTERCARE FOLLOWING SURGERY OF DIGESTIVE SYSTEM: Primary | ICD-10-CM

## 2024-04-03 DIAGNOSIS — Z98.84 BARIATRIC SURGERY STATUS: ICD-10-CM

## 2024-04-03 DIAGNOSIS — K22.4 SPASM OF ESOPHAGUS: ICD-10-CM

## 2024-04-03 DIAGNOSIS — E66.01 OBESITY, CLASS III, BMI 40-49.9 (MORBID OBESITY) (HCC): ICD-10-CM

## 2024-04-03 DIAGNOSIS — K91.2 POSTSURGICAL MALABSORPTION: ICD-10-CM

## 2024-04-03 DIAGNOSIS — Z90.3 H/O GASTRIC SLEEVE: ICD-10-CM

## 2024-04-03 PROCEDURE — 99204 OFFICE O/P NEW MOD 45 MIN: CPT | Performed by: NURSE PRACTITIONER

## 2024-04-03 NOTE — PATIENT INSTRUCTIONS
- start on bariatric multivitamins without iron once daily.   - Start on calcium citrate 500-600 mg two to three times per day  - Obtain vitamin labs in 1-2 months after you start on the vitamins.   - Make sure you're fasting when you get your labs done.     - UGI (xray of the stomach) to evaluate your reflux/heartburn.  - 24 hr ph study which will evaluate the acidity in your stomach. PLEASE STOP ALL ANTACIDS 5 DAYS PRIOR TO YOUR STUDY. (Call central scheduling to get these tests scheduled)  - follow up after your studies to see Dr. Brad Mauro.     - working on eating small amounts in small portions and increase your protein intake.

## 2024-04-03 NOTE — PROGRESS NOTES
Assessment/Plan:     Patient ID: Aristeo Hall is a 63 y.o. male.     Bariatric Surgery Status/Dysphagia/BMI 40    -s/p Vertical Sleeve Gastrectomy with [facility] in 2019. Presents to the office today for to establish care with concerns of weight regain along with worsening dysphagia symptoms. He is interested in surgical interventions to help him with his symptoms and his weight gain. Reports he always had the sensation of food being stuck ever since surgery however thought to be normal and expected after surgery. He lost a significant amount of weight shortly after - reach luisa of 220 lbs from 375 lbs within 6 months secondary to decrease oral intake. Has been able to maintain his weight of 245-250 lbs however noted to have weight gain in the past year.    Unable to tolerate many types of foods; tries to eat a softer diet. Having food and fluid regurgitation. Has been following with GI to evaluate dysphagia and GERD symptoms. Taking omeprazole 40 mg PO BID currently. EGD without esophagitis; esophageal manometry shows distal esophageal spasm.     Denies smoking, ETOH use. Uses daily aspirin. Is also on plavix for hx of CVA.     EGD 01/23/2024 -     [facility]  [address] [id number] [phone]        DATE OF SERVICE:  1/23/24     PHYSICIAN(S):  Attending:   Lopez Montano DO      Fellow:   No Staff Documented         INDICATION:  Esophageal dysphagia     POST-OP DIAGNOSIS:  See the impression below.     PREPROCEDURE:  Informed consent was obtained for the procedure, including sedation.  Risks of perforation, hemorrhage, adverse drug reaction and aspiration were discussed. The patient was placed in the left lateral decubitus position.     Patient was explained about the risks and benefits of the procedure. Risks including but not limited to bleeding, infection, and perforation were explained in detail. Also explained about less than 100%  sensitivity with the exam and other alternatives.     PROCEDURE: EGD     DETAILS OF PROCEDURE:   Patient was taken to the procedure room where a time out was performed to confirm correct patient and correct procedure. The patient underwent monitored anesthesia care, which was administered by an anesthesia professional. The patient's blood pressure, ECG and oxygen were monitored throughout the procedure. The scope was introduced through the mouth and advanced to the second part of the duodenum. Retroflexion was performed in the fundus. The patient experienced no blood loss. The procedure was not difficult. The patient tolerated the procedure well. There were no apparent adverse events.      ANESTHESIA INFORMATION:  ASA: ASA status not filed in the log.  Anesthesia Type: Anesthesia type not filed in the log.     MEDICATIONS:  No administrations occurring from 1240 to 1254 on 01/23/24         FINDINGS:  Irregular Z-line        SPECIMENS:  * No specimens in log *        IMPRESSION:  Irregular Z-line        RECOMMENDATION:    Esophageal manometry                   Lopez Montano DO          Esophageal Manometry - 02/27/2024  Esophageal Manometry   Aristeo Hall 63 y.o. male MRN: [id number]  [facility]  [address] [place] [id number]  [phone]     Procedure Date: 2/27/2024  Referring Physician: Amanda Dempsey PA-C   Attending: Lopez Montano DO  Indications(s): Dysphagia  Motility Nurse: Samantha Schoendorfer     I have reviewed the indications for the procedure, the manometric recordings for the procedure, and the measured/calculated manometric parameters and other findings included in the accompanying motility note.          Impression:   High LES resting pressure with incomplete relaxation (high IRP)  Short DL's with simultaneous contractions with liquids (6/10) and viscous (10/10)  Incomplete bolus transit with liquids  Complete bolus transit with  viscous     Post-procedure Diagnoses:    Distal esophageal spasm (patient on opiates)      PLAN:     - obtain UGI and 24 hr ph study off omeprazole for 5 days. He denies any nickel sensitivity.   - follow up with Dr. Brad avila after his studies are completed.   - continue with eating small portions and increasing his protein intake.   - Continue with healthy lifestyle, adequate protein intake of 60 gm, fluid intake of at least 64 oz.   - Continue with MVI daily.   - Activity as tolerated.   - Labs ordered and will adjust accordingly if any deficiency.   - Follow up with RD and SW as needed.       Continued/Maintain healthy weight loss with good nutrition intakes.  Adequate hydration with at least 64oz. fluid intake.  Follow diet as discussed.  Follow vitamin and mineral recommendations as reviewed with you.  Exercise as tolerated.    Colonoscopy referral made: UTD  Egd - utd     Follow-up after studies to discuss with the surgeon. . We kindly ask that your arrive 15 minutes before your scheduled appointment time with your provider to allow our staff to room you, get your vital signs and update your chart.    Get lab work done prior to annual visit. Please call the office if you need a script.  It is recommended to check with your insurance BEFORE getting labs done to make sure they are covered by your policy.      Call our office if you have any problems with abdominal pain especially associated with fever, chills, nausea, vomiting or any other concerns.    All  Post-bariatric surgery patients should be aware that very small quantities of any alcohol can cause impairment and it is very possible not to feel the effect. The effect can be in the system for several hours.  It is also a stomach irritant.     It is advised to AVOID alcohol, Nonsteroidal antiinflammatory drugs (NSAIDS) and nicotine of all forms . Any of these can cause stomach irritation/pain.    Discussed the effects of alcohol on a bariatric patient and  the increased impairment risk.     Keep up the good work!     Postsurgical Malabsorption   -At risk for malabsorption of vitamins/minerals secondary to malabsorption and restriction of intake from bariatric surgery  -Not Currently taking adequate postop bariatric surgery vitamin supplementation  -Next set of bariatric labs ordered for approximately 1 month  -Patient received education about the importance of adhering to a lifelong supplementation regimen to avoid vitamin/mineral deficiencies      Diagnoses and all orders for this visit:    Encounter for surgical aftercare following surgery of digestive system  -     24hr pH testing; Future  -     FL UPPER GI UGI; Future  -     CBC; Future  -     Comprehensive metabolic panel; Future  -     Folate; Future  -     Iron Panel (Includes Ferritin, Iron Sat%, Iron, and TIBC); Future  -     PTH, intact; Future  -     Methylmalonic acid, serum; Future  -     Vitamin A; Future  -     Vitamin D 25 hydroxy; Future  -     Vitamin B12; Future  -     Vitamin B1, whole blood; Future  -     Zinc; Future    Spasm of esophagus  -     Ambulatory Referral to Weight Management  -     24hr pH testing; Future  -     FL UPPER GI UGI; Future    H/O gastric sleeve  -     Ambulatory Referral to Weight Management    Bariatric surgery status  -     24hr pH testing; Future  -     FL UPPER GI UGI; Future  -     CBC; Future  -     Comprehensive metabolic panel; Future  -     Folate; Future  -     Iron Panel (Includes Ferritin, Iron Sat%, Iron, and TIBC); Future  -     PTH, intact; Future  -     Methylmalonic acid, serum; Future  -     Vitamin A; Future  -     Vitamin D 25 hydroxy; Future  -     Vitamin B12; Future  -     Vitamin B1, whole blood; Future  -     Zinc; Future    Postsurgical malabsorption  -     24hr pH testing; Future  -     FL UPPER GI UGI; Future  -     CBC; Future  -     Comprehensive metabolic panel; Future  -     Folate; Future  -     Iron Panel (Includes Ferritin, Iron Sat%,  Iron, and TIBC); Future  -     PTH, intact; Future  -     Methylmalonic acid, serum; Future  -     Vitamin A; Future  -     Vitamin D 25 hydroxy; Future  -     Vitamin B12; Future  -     Vitamin B1, whole blood; Future  -     Zinc; Future    Obesity, Class III, BMI 40-49.9 (morbid obesity) ([facility])  -     24hr pH testing; Future  -     FL UPPER GI UGI; Future  -     CBC; Future  -     Comprehensive metabolic panel; Future  -     Folate; Future  -     Iron Panel (Includes Ferritin, Iron Sat%, Iron, and TIBC); Future  -     PTH, intact; Future  -     Methylmalonic acid, serum; Future  -     Vitamin A; Future  -     Vitamin D 25 hydroxy; Future  -     Vitamin B12; Future  -     Vitamin B1, whole blood; Future  -     Zinc; Future    GERD (gastroesophageal reflux disease)  -     24hr pH testing; Future  -     FL UPPER GI UGI; Future  -     CBC; Future  -     Comprehensive metabolic panel; Future  -     Folate; Future  -     Iron Panel (Includes Ferritin, Iron Sat%, Iron, and TIBC); Future  -     PTH, intact; Future  -     Methylmalonic acid, serum; Future  -     Vitamin A; Future  -     Vitamin D 25 hydroxy; Future  -     Vitamin B12; Future  -     Vitamin B1, whole blood; Future  -     Zinc; Future         Subjective:      Patient ID: Aristeo Hall is a 63 y.o. male.    -s/p Vertical Sleeve Gastrectomy with [facility] in 2019. Presents to the office today for to establish care with concerns of weight regain along with worsening dysphagia symptoms. He is interested in surgical interventions to help him with his symptoms and his weight gain.    Initial: 375 lbs  Current: 313.5 lbs   EWL: (Weight loss is ahead of schedule at this post surgical period.)  James: 221 lbs.   Goal - 250 lbs  Current BMI is Body mass index is 40.8 kg/m².    Tolerating a regular diet-yes  Eating at least 60 grams of protein per day-no  Following 30/60 minute rule with liquids-no  Drinking at least 64 ounces of fluid per day-yes  Drinking  "carbonated beverages-no  Sufficient exercise- no - limited due to disability.   Using NSAIDs regularly-yes - daily ASA   Using nicotine-no  Using alcohol-no  Supplements:  none -   Vitamin B12 1000 mcg once daily and  folic acid. 1 mg - advised to start on bariatric multivitamins and calcium.     EWL is 14%, which DOES NOT places the patient ahead of schedule for expected post surgical weight loss at this time.     The following portions of the patient's history were reviewed and updated as appropriate: allergies, current medications, past family history, past medical history, past social history, past surgical history and problem list.    Review of Systems   Constitutional:  Positive for fatigue and unexpected weight change.   Respiratory:  Positive for shortness of breath (Chronic - HX OF COPD).    Cardiovascular: Negative.    Gastrointestinal:  Positive for constipation and nausea.        GERD; dysphagia.            Objective:    /84   Pulse 65   Temp 97.6 °F (36.4 °C) (Tympanic)   Ht 6' 1.5\" (1.867 m)   Wt (!) 142 kg (313 lb 8 oz)   BMI 40.80 kg/m²      Physical Exam  Vitals and nursing note reviewed.   Constitutional:       Appearance: Normal appearance. He is obese.   Cardiovascular:      Rate and Rhythm: Normal rate and regular rhythm.      Pulses: Normal pulses.      Heart sounds: Normal heart sounds.   Pulmonary:      Effort: Pulmonary effort is normal.      Breath sounds: Normal breath sounds.   Abdominal:      General: Bowel sounds are normal.      Palpations: Abdomen is soft.      Tenderness: There is no abdominal tenderness.   Musculoskeletal:      Comments: Limited ROM; use of roller walker.      Skin:     General: Skin is warm and dry.   Neurological:      General: No focal deficit present.      Mental Status: He is alert and oriented to person, place, and time. Mental status is at baseline.   Psychiatric:         Mood and Affect: Mood normal.         Behavior: Behavior normal.         " Thought Content: Thought content normal.         Judgment: Judgment normal.           I have spent a total time of 45 minutes on 04/03/24 in caring for this patient including Diagnostic results, Prognosis, Risks and benefits of tx options, Instructions for management, Patient and family education, Importance of tx compliance, Risk factor reductions, Impressions, Counseling / Coordination of care, Documenting in the medical record, Reviewing / ordering tests, medicine, procedures  , and Obtaining or reviewing history  .

## 2024-04-03 NOTE — PROGRESS NOTES
Outpatient Care Management Note:    New HRR referral received. Patient was hospitalized from 3/28-3/31/24 with stroke like symptoms.  He complained of right facial droop and right sided weakness. MRI of brain showed no acute CVA. He is to follow up with Neurology, Neurosurgery and PCP.  He was called back to ER on 4/1 by Dr Watts due to concerns of abnormal MRI finding. On evaluation, they did not find a facial droop or left sided weakness-has mild right sided weakness. He was able to ambulate with his walker.  They did not feel his symptoms were related to aneurysm concerns. He is to follow up outpatient with neurosurgery.     CM called Aristeo, introduced myself and the care management program. . He was at an appt. He will call me back.

## 2024-04-05 ENCOUNTER — OFFICE VISIT (OUTPATIENT)
Dept: FAMILY MEDICINE CLINIC | Facility: HOSPITAL | Age: 64
End: 2024-04-05
Payer: COMMERCIAL

## 2024-04-05 ENCOUNTER — PATIENT OUTREACH (OUTPATIENT)
Dept: FAMILY MEDICINE CLINIC | Facility: HOSPITAL | Age: 64
End: 2024-04-05

## 2024-04-05 ENCOUNTER — APPOINTMENT (OUTPATIENT)
Dept: LAB | Facility: HOSPITAL | Age: 64
End: 2024-04-05
Payer: COMMERCIAL

## 2024-04-05 VITALS
DIASTOLIC BLOOD PRESSURE: 80 MMHG | BODY MASS INDEX: 39.96 KG/M2 | SYSTOLIC BLOOD PRESSURE: 142 MMHG | TEMPERATURE: 97 F | HEIGHT: 74 IN | OXYGEN SATURATION: 96 % | WEIGHT: 311.4 LBS | HEART RATE: 74 BPM

## 2024-04-05 DIAGNOSIS — E11.40 TYPE 2 DIABETES MELLITUS WITH DIABETIC NEUROPATHY, WITHOUT LONG-TERM CURRENT USE OF INSULIN (HCC): ICD-10-CM

## 2024-04-05 DIAGNOSIS — Z09 HOSPITAL DISCHARGE FOLLOW-UP: Primary | ICD-10-CM

## 2024-04-05 DIAGNOSIS — R29.90 STROKE-LIKE SYMPTOMS: ICD-10-CM

## 2024-04-05 DIAGNOSIS — Z48.815 ENCOUNTER FOR SURGICAL AFTERCARE FOLLOWING SURGERY OF DIGESTIVE SYSTEM: ICD-10-CM

## 2024-04-05 DIAGNOSIS — Z90.3 S/P GASTRIC SLEEVE PROCEDURE: ICD-10-CM

## 2024-04-05 DIAGNOSIS — K21.9 GERD (GASTROESOPHAGEAL REFLUX DISEASE): ICD-10-CM

## 2024-04-05 DIAGNOSIS — K91.2 POSTSURGICAL MALABSORPTION: ICD-10-CM

## 2024-04-05 DIAGNOSIS — I50.32 CHRONIC DIASTOLIC CONGESTIVE HEART FAILURE (HCC): ICD-10-CM

## 2024-04-05 DIAGNOSIS — Z98.84 BARIATRIC SURGERY STATUS: ICD-10-CM

## 2024-04-05 DIAGNOSIS — E66.01 OBESITY, CLASS III, BMI 40-49.9 (MORBID OBESITY) (HCC): ICD-10-CM

## 2024-04-05 PROBLEM — E11.9 TYPE 2 DIABETES MELLITUS, WITH LONG-TERM CURRENT USE OF INSULIN (HCC): Status: RESOLVED | Noted: 2017-10-11 | Resolved: 2024-04-05

## 2024-04-05 PROBLEM — L89.150 DECUBITUS ULCER OF SACRAL REGION, UNSTAGEABLE (HCC): Status: RESOLVED | Noted: 2019-06-03 | Resolved: 2024-04-05

## 2024-04-05 PROBLEM — Z79.4 TYPE 2 DIABETES MELLITUS, WITH LONG-TERM CURRENT USE OF INSULIN (HCC): Status: RESOLVED | Noted: 2017-10-11 | Resolved: 2024-04-05

## 2024-04-05 PROBLEM — L89.150 DECUBITUS ULCER OF SACRAL REGION, UNSTAGEABLE (HCC): Status: ACTIVE | Noted: 2019-06-03

## 2024-04-05 LAB
25(OH)D3 SERPL-MCNC: 13 NG/ML (ref 30–100)
ALBUMIN SERPL BCP-MCNC: 3.8 G/DL (ref 3.5–5)
ALP SERPL-CCNC: 75 U/L (ref 34–104)
ALT SERPL W P-5'-P-CCNC: 24 U/L (ref 7–52)
ANION GAP SERPL CALCULATED.3IONS-SCNC: 10 MMOL/L (ref 4–13)
AST SERPL W P-5'-P-CCNC: 17 U/L (ref 13–39)
BILIRUB SERPL-MCNC: 0.62 MG/DL (ref 0.2–1)
BUN SERPL-MCNC: 11 MG/DL (ref 5–25)
CALCIUM SERPL-MCNC: 9 MG/DL (ref 8.4–10.2)
CHLORIDE SERPL-SCNC: 101 MMOL/L (ref 96–108)
CO2 SERPL-SCNC: 29 MMOL/L (ref 21–32)
CREAT SERPL-MCNC: 1.16 MG/DL (ref 0.6–1.3)
ERYTHROCYTE [DISTWIDTH] IN BLOOD BY AUTOMATED COUNT: 13.3 % (ref 11.6–15.1)
EST. AVERAGE GLUCOSE BLD GHB EST-MCNC: 143 MG/DL
FOLATE SERPL-MCNC: 10.3 NG/ML
GFR SERPL CREATININE-BSD FRML MDRD: 67 ML/MIN/1.73SQ M
GLUCOSE SERPL-MCNC: 122 MG/DL (ref 65–140)
HBA1C MFR BLD: 6.6 %
HCT VFR BLD AUTO: 47.9 % (ref 36.5–49.3)
HGB BLD-MCNC: 15 G/DL (ref 12–17)
MCH RBC QN AUTO: 30 PG (ref 26.8–34.3)
MCHC RBC AUTO-ENTMCNC: 31.3 G/DL (ref 31.4–37.4)
MCV RBC AUTO: 96 FL (ref 82–98)
PLATELET # BLD AUTO: 274 THOUSANDS/UL (ref 149–390)
PMV BLD AUTO: 10.3 FL (ref 8.9–12.7)
POTASSIUM SERPL-SCNC: 4.1 MMOL/L (ref 3.5–5.3)
PROT SERPL-MCNC: 7 G/DL (ref 6.4–8.4)
PTH-INTACT SERPL-MCNC: 149.1 PG/ML (ref 12–88)
RBC # BLD AUTO: 5 MILLION/UL (ref 3.88–5.62)
SODIUM SERPL-SCNC: 140 MMOL/L (ref 135–147)
VIT B12 SERPL-MCNC: 989 PG/ML (ref 180–914)
WBC # BLD AUTO: 12.44 THOUSAND/UL (ref 4.31–10.16)

## 2024-04-05 PROCEDURE — 83036 HEMOGLOBIN GLYCOSYLATED A1C: CPT

## 2024-04-05 PROCEDURE — 82607 VITAMIN B-12: CPT

## 2024-04-05 PROCEDURE — G2211 COMPLEX E/M VISIT ADD ON: HCPCS | Performed by: NURSE PRACTITIONER

## 2024-04-05 PROCEDURE — 85027 COMPLETE CBC AUTOMATED: CPT

## 2024-04-05 PROCEDURE — 83970 ASSAY OF PARATHORMONE: CPT

## 2024-04-05 PROCEDURE — 36415 COLL VENOUS BLD VENIPUNCTURE: CPT

## 2024-04-05 PROCEDURE — 84630 ASSAY OF ZINC: CPT

## 2024-04-05 PROCEDURE — 82306 VITAMIN D 25 HYDROXY: CPT

## 2024-04-05 PROCEDURE — 99214 OFFICE O/P EST MOD 30 MIN: CPT | Performed by: NURSE PRACTITIONER

## 2024-04-05 PROCEDURE — 84590 ASSAY OF VITAMIN A: CPT

## 2024-04-05 PROCEDURE — 83918 ORGANIC ACIDS TOTAL QUANT: CPT

## 2024-04-05 PROCEDURE — 80053 COMPREHEN METABOLIC PANEL: CPT

## 2024-04-05 PROCEDURE — 84425 ASSAY OF VITAMIN B-1: CPT

## 2024-04-05 PROCEDURE — 82746 ASSAY OF FOLIC ACID SERUM: CPT

## 2024-04-05 NOTE — ASSESSMENT & PLAN NOTE
Wt Readings from Last 3 Encounters:   04/05/24 (!) 141 kg (311 lb 6.4 oz)   04/03/24 (!) 142 kg (313 lb 8 oz)   03/31/24 (!) 146 kg (322 lb)     Clinically stable as managed by cardiology - f/u UTD

## 2024-04-05 NOTE — ASSESSMENT & PLAN NOTE
Lab Results   Component Value Date    HGBA1C 6.6 (H) 04/05/2024     A1C well controlled on jardiance as rx'd by cardiology  Update diabetic needs at next appt w/PCP scheduled in July

## 2024-04-05 NOTE — PROGRESS NOTES
Outpatient Care Management Note:    Voice mail message left for Aristeo, with my contact information, introducing myself and requesting a call back.     Call received from Aristeo. CM introduced myself and the care management program. Aristeo states that he is doing well. His only concern is his blood sugar. He did have blood work completed today with a HbA1C.  He is interested in getting a glucometer, so he can monitor his sugars at home. CM will forward a message to his PCP.       CM discussed checking daily weights. He states that he has been a heart patient for years and is aware that he should check weights and call cardiology if his weight increases by 3/5 lb rule. He state he weighed 311 lbs at his PCP appt and noted that he does have a scale at home.     Aristeo states that all of his stroke like symptoms have resolved. He knows to seek emergency care for any further stroke like symptoms.    Aristeo declined completing a med review. He states that he has all medications and denied any questions. He noted that his wife manages all medications.     Aristeo has my contact information and will call with any questions.

## 2024-04-05 NOTE — ASSESSMENT & PLAN NOTE
S/P inpatient admission and established with outpatient neurosurgery -  pursue surgery and maintain f/u as planned

## 2024-04-05 NOTE — PROGRESS NOTES
Name: Aristeo Hall      : 1960      MRN: [id number]  Encounter Provider: JACLYN Tellez  Encounter Date: 2024   Encounter department: [facility] [address]     Assessment & Plan     1. Hospital discharge follow-up    2. Stroke-like symptoms  Assessment & Plan:  S/P inpatient admission and established with outpatient neurosurgery -  pursue surgery and maintain f/u as planned      3. History of gastric sleeve procedure  Assessment & Plan:  Established w/weight management and pursuing reconstruction of previous gastric surgery   Recommend & pt agreeable to deferring start of weight loss medication at this time      4. Type 2 diabetes mellitus with diabetic neuropathy, without long-term current use of insulin (HCC)  Assessment & Plan:    Lab Results   Component Value Date    HGBA1C 6.6 (H) 2024     A1C well controlled on jardiance as rx'd by cardiology  Update diabetic needs at next appt w/PCP scheduled in . Chronic diastolic congestive heart failure (HCC)  Assessment & Plan:  Wt Readings from Last 3 Encounters:   24 (!) 141 kg (311 lb 6.4 oz)   24 (!) 142 kg (313 lb 8 oz)   24 (!) 146 kg (322 lb)     Clinically stable as managed by cardiology - f/u UTD                 Subjective      States he is here for hospital follow up. States he was discharged on 3/31 after being admitted with stroke like symptoms - was sent from cardiology appt.  Went back to ED on  - MRA done showed bleeding coils at aneurysm and was advised to see neurosurgery. Has neurosurgery scheduled on 4/15.   States he is feeling miserable since being home - states he is gaining weight despite not eating. Told he has water weight and was previously recommended to start a weight loss medication. Sees cardiology regularly - had appt last week and was sent to ED from there with facial droop. States nobody is managing his diabetes currently except Dr Taylor rx's his  "jardiance. Follows with weight management and due to have previous gastric surgery redone.           Review of Systems   Constitutional:  Positive for unexpected weight change (gaining weight).   Respiratory: Negative.     Cardiovascular: Negative.    Musculoskeletal:  Positive for gait problem (walks w/wheeled walker) and myalgias (BLE neuropathy).   Skin:         Denies skin breakdown/ulcers/sores   Neurological:  Positive for facial asymmetry (right facial droop) and weakness (right arm and leg \"no more than normal\").   Psychiatric/Behavioral:  Negative for dysphoric mood.        Current Outpatient Medications on File Prior to Visit   Medication Sig    albuterol (2.5 mg/3 mL) 0.083 % nebulizer solution Take 3 mL (2.5 mg total) by nebulization every 6 (six) hours as needed for wheezing or shortness of breath    amLODIPine (NORVASC) 5 mg tablet TAKE 1 TABLET (5 MG TOTAL) BY MOUTH DAILY.    ammonium lactate (LAC-HYDRIN) 12 % cream Apply topically as needed for dry skin    aspirin 81 mg chewable tablet Chew 1 tablet (81 mg total) daily    atorvastatin (LIPITOR) 80 mg tablet TAKE 1 TABLET (80 MG TOTAL) BY MOUTH DAILY AFTER DINNER    baclofen 10 mg tablet Take 10 mg by mouth 3 (three) times a day    busPIRone (BUSPAR) 5 mg tablet Take 5 mg by mouth 2 (two) times a day    CALCIUM PO Take 1 tablet by mouth daily    carvedilol (COREG) 12.5 mg tablet TAKE 1 TABLET BY MOUTH TWICE A DAY WITH FOOD    clopidogrel (PLAVIX) 75 mg tablet TAKE 1 TABLET BY MOUTH EVERY DAY    docusate sodium (COLACE) 100 mg capsule Take 1 capsule (100 mg total) by mouth 2 (two) times a day    Empagliflozin (JARDIANCE) 10 MG TABS tablet Take 1 tablet (10 mg total) by mouth every morning    Fluticasone-Salmeterol (Advair Diskus) 500-50 mcg/dose inhaler Inhale 1 puff 2 (two) times a day Rinse mouth after use    folic acid (FOLVITE) 1 mg tablet Take 1 tablet (1 mg total) by mouth daily    furosemide (LASIX) 40 mg tablet Take once in AM daily. Take once " "daily in PM PRN weight gain, edema.    gabapentin (NEURONTIN) 800 mg tablet Take 800 mg by mouth 4 (four) times a day    hydrocortisone 1 % lotion Apply topically if needed for rash    Klor-Con M20 20 MEQ tablet TAKE 1 TABLET BY MOUTH EVERY DAY    lidocaine (LIDODERM) 5 % Apply 1 patch topically daily back    losartan (COZAAR) 50 mg tablet TAKE 1 TABLET BY MOUTH EVERY DAY    methocarbamol (ROBAXIN) 750 mg tablet TAKE 1 TABLET (750 MG TOTAL) BY MOUTH EVERY 6 (SIX) HOURS AS NEEDED FOR MUSCLE SPASMS    naloxegol oxalate (MOVANTIK) 25 MG tablet Take 1 tablet (25 mg total) by mouth daily in the early morning    nitroglycerin (Nitrostat) 0.4 mg SL tablet Place 1 tablet (0.4 mg total) under the tongue every 5 (five) minutes as needed for chest pain (pt has not  used recently)    nystatin (MYCOSTATIN) cream Apply 2 g (1 Application total) topically 2 (two) times a day    omeprazole (PriLOSEC) 40 MG capsule Take 1 capsule (40 mg total) by mouth 2 (two) times a day before meals    ondansetron (ZOFRAN) 4 mg tablet Take 1 tablet (4 mg total) by mouth every 8 (eight) hours as needed for nausea or vomiting    predniSONE 10 mg tablet Take 2 a day for 1 week then 1 a day for 14 days    ranolazine (RANEXA) 1000 MG SR tablet TAKE 1 TABLET (1,000 MG TOTAL) BY MOUTH EVERY 12 HOURS    tamsulosin (FLOMAX) 0.4 mg TAKE 1 CAPSULE BY MOUTH EVERY DAY WITH DINNER    traZODone (DESYREL) 100 mg tablet Take 1 tablet (100 mg total) by mouth daily at bedtime    vitamin B-12 (VITAMIN B-12) 1,000 mcg tablet Take 1 tablet (1,000 mcg total) by mouth daily    Cyanocobalamin (VITAMIN B-12 PO) Take 1 tablet by mouth daily       Objective     /80 (BP Location: Left arm, Patient Position: Sitting, Cuff Size: Standard)   Pulse 74   Temp (!) 97 °F (36.1 °C) (Tympanic)   Ht 6' 1.5\" (1.867 m)   Wt (!) 141 kg (311 lb 6.4 oz)   SpO2 96%   BMI 40.53 kg/m²       Physical Exam  Vitals reviewed.   Constitutional:       General: He is not in acute " distress.     Appearance: Normal appearance. He is obese. He is ill-appearing.   Cardiovascular:      Rate and Rhythm: Normal rate and regular rhythm.   Pulmonary:      Effort: Pulmonary effort is normal. No respiratory distress.      Breath sounds: Normal breath sounds.   Musculoskeletal:      Right lower leg: Edema (+1 pitting) present.      Left lower leg: Edema (+1 pitting) present.   Skin:     General: Skin is warm and dry.   Neurological:      Mental Status: He is alert and oriented to person, place, and time.      Cranial Nerves: Facial asymmetry (right facial droop) present.      Motor: Weakness (right side arm and leg) present.      Gait: Gait abnormal (steady w/walker).   Psychiatric:         Mood and Affect: Mood normal.         JACLYN Tellez

## 2024-04-05 NOTE — ASSESSMENT & PLAN NOTE
Established w/weight management and pursuing reconstruction of previous gastric surgery   Recommend & pt agreeable to deferring start of weight loss medication at this time

## 2024-04-08 ENCOUNTER — HOSPITAL ENCOUNTER (OUTPATIENT)
Dept: PULMONOLOGY | Facility: HOSPITAL | Age: 64
Discharge: HOME | End: 2024-04-08
Attending: INTERNAL MEDICINE
Payer: COMMERCIAL

## 2024-04-08 DIAGNOSIS — E11.40 TYPE 2 DIABETES MELLITUS WITH DIABETIC NEUROPATHY, WITHOUT LONG-TERM CURRENT USE OF INSULIN (HCC): Primary | ICD-10-CM

## 2024-04-08 DIAGNOSIS — J44.9 CHRONIC OBSTRUCTIVE PULMONARY DISEASE, UNSPECIFIED COPD TYPE (HCC): ICD-10-CM

## 2024-04-08 PROCEDURE — 94010 BREATHING CAPACITY TEST: CPT

## 2024-04-08 PROCEDURE — 94760 N-INVAS EAR/PLS OXIMETRY 1: CPT

## 2024-04-08 PROCEDURE — 94729 DIFFUSING CAPACITY: CPT

## 2024-04-08 PROCEDURE — 94010 BREATHING CAPACITY TEST: CPT | Performed by: INTERNAL MEDICINE

## 2024-04-08 PROCEDURE — 94729 DIFFUSING CAPACITY: CPT | Performed by: INTERNAL MEDICINE

## 2024-04-09 LAB — ZINC SERPL-MCNC: 78 UG/DL (ref 44–115)

## 2024-04-09 RX ORDER — PROCHLORPERAZINE 25 MG/1
SUPPOSITORY RECTAL
Qty: 1 EACH | Refills: 0 | Status: SHIPPED | OUTPATIENT
Start: 2024-04-09

## 2024-04-09 RX ORDER — PROCHLORPERAZINE 25 MG/1
SUPPOSITORY RECTAL
Qty: 1 EACH | Refills: 3 | Status: SHIPPED | OUTPATIENT
Start: 2024-04-09

## 2024-04-09 RX ORDER — PROCHLORPERAZINE 25 MG/1
SUPPOSITORY RECTAL
Qty: 9 EACH | Refills: 3 | Status: SHIPPED | OUTPATIENT
Start: 2024-04-09

## 2024-04-10 ENCOUNTER — HOSPITAL ENCOUNTER (OUTPATIENT)
Dept: RADIOLOGY | Facility: HOSPITAL | Age: 64
Discharge: HOME | End: 2024-04-10
Payer: COMMERCIAL

## 2024-04-10 DIAGNOSIS — Z48.815 ENCOUNTER FOR SURGICAL AFTERCARE FOLLOWING SURGERY OF DIGESTIVE SYSTEM: ICD-10-CM

## 2024-04-10 DIAGNOSIS — K22.4 SPASM OF ESOPHAGUS: ICD-10-CM

## 2024-04-10 DIAGNOSIS — Z98.84 BARIATRIC SURGERY STATUS: ICD-10-CM

## 2024-04-10 DIAGNOSIS — E66.01 OBESITY, CLASS III, BMI 40-49.9 (MORBID OBESITY) (HCC): ICD-10-CM

## 2024-04-10 DIAGNOSIS — K91.2 POSTSURGICAL MALABSORPTION: ICD-10-CM

## 2024-04-10 DIAGNOSIS — K21.9 GERD (GASTROESOPHAGEAL REFLUX DISEASE): ICD-10-CM

## 2024-04-10 LAB — VIT B1 BLD-SCNC: 135.4 NMOL/L (ref 66.5–200)

## 2024-04-10 PROCEDURE — 74240 X-RAY XM UPR GI TRC 1CNTRST: CPT

## 2024-04-11 ENCOUNTER — TELEPHONE (OUTPATIENT)
Dept: BARIATRICS | Facility: CLINIC | Age: 64
End: 2024-04-11

## 2024-04-11 DIAGNOSIS — Z98.84 BARIATRIC SURGERY STATUS: Primary | ICD-10-CM

## 2024-04-11 DIAGNOSIS — E55.9 VITAMIN D DEFICIENCY: ICD-10-CM

## 2024-04-11 LAB — VIT A SERPL-MCNC: 33.3 UG/DL (ref 22–69.5)

## 2024-04-11 RX ORDER — ERGOCALCIFEROL 1.25 MG/1
50000 CAPSULE ORAL 2 TIMES WEEKLY
Qty: 24 CAPSULE | Refills: 0 | Status: SHIPPED | OUTPATIENT
Start: 2024-04-11

## 2024-04-11 NOTE — TELEPHONE ENCOUNTER
----- Message from JACLYN Elizabeth sent at 4/11/2024  3:17 PM EDT -----  Please call pt to let him know we have received his labs. They are all within limits EXCEPT FOR the following:     - Your vitamin D level is very low along with elevated PTH which can affect your bone health.  Recommend that you take 50,000 IU of vitamin D2 twice per week for 12 weeks. This will be sent to your pharmacy to . In addition, you need to take 1200 to 1500 mg of calcium citrate per day, in divided doses of 500 to 600 mg each.  After 12 weeks, switch to a maintenance dose of 2000 IU vitamin D3 per day and continue to take calcium daily.  Your levels will be checked again in 3 months.     Vitamin b12 level is elevated but this is NOT harmful. Please continue with your supplements as is.     JACLYN Plaza

## 2024-04-11 NOTE — TELEPHONE ENCOUNTER
Pt returned call reviewed lab results, pt verbalized understanding and what was advised    0 = understands/communicates without difficulty

## 2024-04-15 ENCOUNTER — OFFICE VISIT (OUTPATIENT)
Dept: NEUROSURGERY | Facility: CLINIC | Age: 64
End: 2024-04-15
Payer: COMMERCIAL

## 2024-04-15 VITALS
BODY MASS INDEX: 40.9 KG/M2 | RESPIRATION RATE: 16 BRPM | WEIGHT: 302 LBS | HEIGHT: 72 IN | DIASTOLIC BLOOD PRESSURE: 90 MMHG | SYSTOLIC BLOOD PRESSURE: 122 MMHG | HEART RATE: 78 BPM | TEMPERATURE: 96.7 F | OXYGEN SATURATION: 97 %

## 2024-04-15 DIAGNOSIS — I67.1 CEREBRAL ANEURYSM: Primary | ICD-10-CM

## 2024-04-15 DIAGNOSIS — Z96.89 STATUS POST INSERTION OF SPINAL CORD STIMULATOR: ICD-10-CM

## 2024-04-15 LAB — METHYLMALONATE SERPL-SCNC: 175 NMOL/L (ref 0–378)

## 2024-04-15 PROCEDURE — 99214 OFFICE O/P EST MOD 30 MIN: CPT | Performed by: NURSE PRACTITIONER

## 2024-04-15 RX ORDER — SODIUM CHLORIDE 9 MG/ML
75 INJECTION, SOLUTION INTRAVENOUS CONTINUOUS
OUTPATIENT
Start: 2024-04-15

## 2024-04-15 NOTE — ASSESSMENT & PLAN NOTE
Presents for follow up of residual AcoA aneurysm s/p coil embolization  S/p coil embolization for incidental AcoA aneurysm in 2004 at [facility].  Hospitalized 3/28 at [facility] for R sided weakness, numbness and R facial droop, then re-admitted 4/1 w/ c/o worsening dizziness in setting of updated MRA indicating residual aneurysm.  Continues to endorse constant dizziness and blurred vision since hospitalization.  Blood pressure is well-controlled on Norvasc, Coreg and Losartan.  No smoking history.  Unknown family history.  On exam, with blurred vision diplopia/difficulty reading at times. Bilateral superior and inferior temporal field vision loss. Otherwise non-focal. Pain inhibited motor weakness.    Imaging:  MRA head w/wo, 3/31/2024: 1.  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 Increased size of 4 mm focal enhancement along the posteromedial margin of the coil mass suspicious for flow in the base of the aneurysm. Recommend further assessment with catheter angiogram. 2.  Patent major vessels of the [place] without high-grade stenosis.    Plan:  Reviewed imaging extensively with patient.  Recommend proceeding with formal angiogram in setting of residual aneurysm.  Maintain normotension.  Follow up with neurology as scheduled in June.  Discussed symptoms of aneurysm rupture that would prompt emergent evaluation including severe, sudden onset headache, N/V, seizure.  Follow up as scheduled for angiogram.

## 2024-04-15 NOTE — PROGRESS NOTES
Neurosurgery Office Note  Aristeo Hall 63 y.o. male MRN: [id number]      Assessment/Plan     Cerebral aneurysm  Presents for follow up of residual AcoA aneurysm s/p coil embolization  S/p coil embolization for incidental AcoA aneurysm in 2004 at [facility].  Hospitalized 3/28 at [facility] for R sided weakness, numbness and R facial droop, then re-admitted 4/1 w/ c/o worsening dizziness in setting of updated MRA indicating residual aneurysm.  Continues to endorse constant dizziness and blurred vision since hospitalization.  Blood pressure is well-controlled on Norvasc, Coreg and Losartan.  No smoking history.  Unknown family history.  On exam, with blurred vision diplopia/difficulty reading at times. Bilateral superior and inferior temporal field vision loss. Otherwise non-focal. Pain inhibited motor weakness.    Imaging:  MRA head w/wo, 3/31/2024: 1.  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 Increased size of 4 mm focal enhancement along the posteromedial margin of the coil mass suspicious for flow in the base of the aneurysm. Recommend further assessment with catheter angiogram. 2.  Patent major vessels of the [place] without high-grade stenosis.    Plan:  Reviewed imaging extensively with patient.  Recommend proceeding with formal angiogram in setting of residual aneurysm.  Maintain normotension.  Follow up with neurology as scheduled in June.  Discussed symptoms of aneurysm rupture that would prompt emergent evaluation including severe, sudden onset headache, N/V, seizure.  Follow up as scheduled for angiogram.    Status post insertion of spinal cord stimulator  S/p medtronic thoracic SCS placed 3/17/2021 by Dr. Cho.       Diagnoses and all orders for this visit:    Cerebral aneurysm    Status post insertion of spinal cord stimulator          I have spent a total time of 40 minutes on 04/15/24 in caring for this patient including Diagnostic results, Risks and  benefits of tx options, Instructions for management, Patient and family education, Importance of tx compliance, Risk factor reductions, Impressions, Counseling / Coordination of care, Documenting in the medical record, Reviewing / ordering tests, medicine, procedures  , and Obtaining or reviewing history  .      CHIEF COMPLAINT    Chief Complaint   Patient presents with    Follow-up     Hospital follow up, with MRA       HISTORY    History of Present Illness     63 y.o. year old male     With past medical history of prior stroke in 2004 with residual field deficit, chronic migraines, ARLEEN, obesity, CAD status post MI and 6 cardiac stent placements, COPD, hypertension, type 2 diabetes, hyperlipidemia, CHF, prior gastric sleeve, chronic pain status post spinal cord stimulator placement and intrathecal pump, status post ACOM aneurysm coil embolization in 2004 at [facility], who presents for follow-up evaluation after hospitalization with updated imaging indicating residual ACOM aneurysm.    On 3/28 he presented to the [facility] emergency department with complaint of right facial droop and right-sided weakness as well as dizziness.  He was admitted on the stroke pathway.  MRI was negative for acute stroke.  He was discharged on 3/31 on aspirin and Plavix with a loop recorder and MRA was completed as well.  Results of MRA were given after patient was discharged and when he was found with results he expressed concern for ongoing symptoms of dizziness and imbalance.  Patient's PCP sent him to the ER for follow-up evaluation on 4/1.  At that time he was evaluated by our service and recommended for outpatient follow-up in 2 weeks.  He presents today for that appointment.    He continues to endorse significant ongoing symptoms including headaches, blurred vision, diplopia, difficulty reading, imbalance and generalized pain.  He relates that the symptoms of blurred vision and headaches have been ongoing  since he was discharged from the hospital.  He also continues to endorse some dizziness.  He has not sustained any falls.  He ambulates at baseline with a walker.        See Discussion    REVIEW OF SYSTEMS    Review of Systems   HENT:  Positive for tinnitus.    Eyes:  Positive for visual disturbance (blurry vision).   Respiratory:  Positive for chest tightness and shortness of breath.    Gastrointestinal:  Positive for constipation, nausea and vomiting.   Genitourinary:  Positive for difficulty urinating.   Musculoskeletal:  Positive for gait problem (unsteady, using rolling walker).        10/10 generalized pain, everywhere   Neurological:  Positive for dizziness, weakness and headaches (7/10 today). Negative for seizures.   Hematological:  Bruises/bleeds easily (meds).   Psychiatric/Behavioral:  Positive for sleep disturbance.        ROS obtained by MA. Reviewed. See HPI.     Meds/Allergies     Current Outpatient Medications   Medication Sig Dispense Refill    albuterol (2.5 mg/3 mL) 0.083 % nebulizer solution Take 3 mL (2.5 mg total) by nebulization every 6 (six) hours as needed for wheezing or shortness of breath 720 mL 0    amLODIPine (NORVASC) 5 mg tablet TAKE 1 TABLET (5 MG TOTAL) BY MOUTH DAILY. 90 tablet 0    ammonium lactate (LAC-HYDRIN) 12 % cream Apply topically as needed for dry skin 385 g 2    aspirin 81 mg chewable tablet Chew 1 tablet (81 mg total) daily 90 tablet 1    atorvastatin (LIPITOR) 80 mg tablet TAKE 1 TABLET (80 MG TOTAL) BY MOUTH DAILY AFTER DINNER 90 tablet 0    baclofen 10 mg tablet Take 10 mg by mouth 3 (three) times a day      busPIRone (BUSPAR) 5 mg tablet Take 5 mg by mouth 2 (two) times a day      CALCIUM PO Take 1 tablet by mouth daily      carvedilol (COREG) 12.5 mg tablet TAKE 1 TABLET BY MOUTH TWICE A DAY WITH FOOD 60 tablet 3    clopidogrel (PLAVIX) 75 mg tablet TAKE 1 TABLET BY MOUTH EVERY DAY 90 tablet 0    Continuous Blood Gluc  (Dexcom G6 ) CANDACE 1 DEXCOM G6   FOR CONTINUOUS GLUCOSE MONITORING 1 each 0    Continuous Blood Gluc Sensor (Dexcom G6 Sensor) MISC 3 PACK SENSOR FOR CONTINUOUS GLUCOSE MONITORING 9 each 3    Continuous Blood Gluc Transmit (Dexcom G6 Transmitter) MISC 1 TRANSMITTED EVERY 3 MONTHS FOR CONTINUOUS GLUCOSE MONITORING 1 each 3    docusate sodium (COLACE) 100 mg capsule Take 1 capsule (100 mg total) by mouth 2 (two) times a day 180 capsule 3    Empagliflozin (JARDIANCE) 10 MG TABS tablet Take 1 tablet (10 mg total) by mouth every morning 30 tablet 11    ergocalciferol (VITAMIN D2) 50,000 units Take 1 capsule (50,000 Units total) by mouth 2 (two) times a week 24 capsule 0    Fluticasone-Salmeterol (Advair Diskus) 500-50 mcg/dose inhaler Inhale 1 puff 2 (two) times a day Rinse mouth after use 60 blister 3    folic acid (FOLVITE) 1 mg tablet Take 1 tablet (1 mg total) by mouth daily 90 tablet 1    furosemide (LASIX) 40 mg tablet Take once in AM daily. Take once daily in PM PRN weight gain, edema. 180 tablet 3    gabapentin (NEURONTIN) 800 mg tablet Take 800 mg by mouth 4 (four) times a day      hydrocortisone 1 % lotion Apply topically if needed for rash 59 mL 1    Klor-Con M20 20 MEQ tablet TAKE 1 TABLET BY MOUTH EVERY DAY 90 tablet 0    lidocaine (LIDODERM) 5 % Apply 1 patch topically daily back      losartan (COZAAR) 50 mg tablet TAKE 1 TABLET BY MOUTH EVERY DAY 90 tablet 1    methocarbamol (ROBAXIN) 750 mg tablet TAKE 1 TABLET (750 MG TOTAL) BY MOUTH EVERY 6 (SIX) HOURS AS NEEDED FOR MUSCLE SPASMS 120 tablet 0    naloxegol oxalate (MOVANTIK) 25 MG tablet Take 1 tablet (25 mg total) by mouth daily in the early morning 30 tablet 5    nitroglycerin (Nitrostat) 0.4 mg SL tablet Place 1 tablet (0.4 mg total) under the tongue every 5 (five) minutes as needed for chest pain (pt has not  used recently) 30 tablet 0    nystatin (MYCOSTATIN) cream Apply 2 g (1 Application total) topically 2 (two) times a day 15 g 0    omeprazole (PriLOSEC) 40 MG capsule  Take 1 capsule (40 mg total) by mouth 2 (two) times a day before meals 60 capsule 5    ondansetron (ZOFRAN) 4 mg tablet Take 1 tablet (4 mg total) by mouth every 8 (eight) hours as needed for nausea or vomiting 60 tablet 3    predniSONE 10 mg tablet Take 2 a day for 1 week then 1 a day for 14 days 28 tablet 0    ranolazine (RANEXA) 1000 MG SR tablet TAKE 1 TABLET (1,000 MG TOTAL) BY MOUTH EVERY 12 HOURS 60 tablet 3    tamsulosin (FLOMAX) 0.4 mg TAKE 1 CAPSULE BY MOUTH EVERY DAY WITH DINNER 90 capsule 1    traZODone (DESYREL) 100 mg tablet Take 1 tablet (100 mg total) by mouth daily at bedtime 30 tablet 0    vitamin B-12 (VITAMIN B-12) 1,000 mcg tablet Take 1 tablet (1,000 mcg total) by mouth daily 90 tablet 3    Cyanocobalamin (VITAMIN B-12 PO) Take 1 tablet by mouth daily       No current facility-administered medications for this visit.       No Known Allergies    PAST HISTORY    Past Medical History:   Diagnosis Date    Acute on chronic diastolic congestive heart failure (HCC)     Altered gait     Alzheimer disease (HCC)     per patients,,early onset     Angina pectoris (HCC)     Anxiety     Arthritis     Brain aneurysm     coils placed    Cardiac disease     Chest pain 01/13/2016    Chronic kidney disease     Chronic pain     back/ right groin and rle- has morphine pump    Constipation     COPD (chronic obstructive pulmonary disease) (HCC)     Coronary artery disease     CPAP (continuous positive airway pressure) dependence     Decubital ulcer     sacral decub-occured 5/2019-sees wound care/debide in OR today 6/6/2019    Dependent on walker for ambulation     w/c for long distance    Depression     Diabetes mellitus (HCC)     insulin dependent    Dizziness     occ    Dysphagia     Enlarged prostate     Esophageal stricture In file    Esophageal varices (HCC)     Fall     Fall at home 05/03/2019    GERD (gastroesophageal reflux disease)     Heart failure (HCC)     Hiatal hernia     Hx of gastric bypass 11/19/2018     Hypercholesterolemia     Hypertension     MI (myocardial infarction) ([facility])     2017- stents x2    Migraine     Morbid obesity ([facility])     gastric bypass sleeve 11/2018-wt loss 125 lb    Neuropathy     Oxygen dependent     Q HS  2LPM with CPAP and prn during day 2-3 LPM     Pressure injury of skin     Pressure injury of skin of sacral region 06/03/2019    Added automatically from request for surgery [id number]    Renal disorder     Shortness of breath     Skin abnormality     sacral wound - covered with pad    Sleep apnea     Stented coronary artery     Stroke ([facility])     vision loss b/l  2005, residual R leg weakness    Type 2 diabetes mellitus with diabetic neuropathy, with long-term current use of insulin ([facility]) 10/18/2019    Type 2 diabetes mellitus with renal complication ([facility])     insulin dependent    Type 2 diabetes mellitus, with long-term current use of insulin ([facility]) 10/11/2017    Transitioned From: Diabetes mellitus type 2, uncontrolled    Urinary frequency     Use of cane as ambulatory aid     Wears dentures     Wears glasses     Wears glasses     Wheelchair dependent        Past Surgical History:   Procedure Laterality Date    BACK SURGERY      BRAIN SURGERY      CARDIAC CATHETERIZATION      with stents    CARDIAC CATHETERIZATION N/A 8/18/2023    Procedure: Cardiac Coronary Angiogram;  Surgeon: Horacio Weiner MD;  Location: [facility];  Service: Cardiology    CEREBRAL ANEURYSM REPAIR      with coils    COLONOSCOPY      ESOPHAGOGASTRODUODENOSCOPY N/A 7/1/2016    Procedure: ESOPHAGOGASTRODUODENOSCOPY (EGD);  Surgeon: Reno Christiansen MD;  Location: [facility] GI [facility];  Service:     GASTRIC BYPASS  11/19/2018    HERNIA REPAIR      HIATAL HERNIA REPAIR      INFUSION PUMP IMPLANTATION Left     morphine    INTRATHECAL PUMP IMPLANTATION Left 7/9/2020    Procedure: REVISION INTRATHECAL PAIN PUMP POCKET, LEFT ABDOMEN;  Surgeon: Homero Cho MD;  Location: [facility];  Service: Neurosurgery    KNEE ARTHROSCOPY Right     KNEE  ARTHROSCOPY Right     PERONEAL NERVE DECOMPRESSION Right     MN DEBRIDEMENT OPEN WOUND FIRST 20 SQ CM/< N/A 6/6/2019    Procedure: EXCISIONAL DEBRIDEMENT OF SACRAL DECUBITUS ULCER;  Surgeon: Siddhartha Omer MD;  Location: [facility];  Service: General    MN ESOPHAGOGASTRODUODENOSCOPY TRANSORAL DIAGNOSTIC N/A 2/27/2017    Procedure: ESOPHAGOGASTRODUODENOSCOPY (EGD);  Surgeon: Reno Christiansen MD;  Location: [facility];  Service: Gastroenterology    MN ESOPHAGOGASTRODUODENOSCOPY TRANSORAL DIAGNOSTIC N/A 8/23/2018    Procedure: ESOPHAGOGASTRODUODENOSCOPY (EGD) with biopsy;  Surgeon: Reno Christiansen MD;  Location: [facility];  Service: Gastroenterology    MN IMPLTJ/RPLCMT ITHCL/EDRL DRUG NFS PRGRBL PUMP Left 10/13/2020    Procedure: EXPLORATION OF INTRATHECAL PAIN PUMP SYSTEM INTEGRITY FOR POSSIBLE REPLACEMENT OF CATHETER AND PUMP.;  Surgeon: Homero Cho MD;  Location: [facility];  Service: Neurosurgery    MN IMPLTJ/RPLCMT ITHCL/EDRL DRUG NFS SUBQ RSVR N/A 1/19/2017    Procedure: REMOVAL / EXCHANGE INTRATHECAL PAIN PUMP;  Surgeon: Que Leonard MD;  Location: [facility];  Service: Orthopedics    MN IMPLTJ/RPLCMT ITHCL/EDRL DRUG NFS SUBQ RSVR N/A 5/16/2016    Procedure: REPLACEMENT AND PROGRAM PUMP ;  Surgeon: Que Leonard MD;  Location: [facility];  Service: Orthopedics    MN PRQ IMPLTJ NSTIM ELECTRODE ARRAY EPIDURAL Right 3/17/2021    Procedure: INSERTION THORACIC DORSAL COLUMN SPINAL CORD STIMULATOR PERCUTANEOUS W IMPLANTABLE PULSE GENERATOR, RIGHT;  Surgeon: Homero Cho MD;  Location: [facility];  Service: Neurosurgery       Social History     Tobacco Use    Smoking status: Never    Smokeless tobacco: Never   Vaping Use    Vaping status: Never Used   Substance Use Topics    Alcohol use: Never    Drug use: Never     Types: Marijuana     Comment: Medical card       Family History   Problem Relation Age of Onset    Diabetes unspecified Mother     Diabetes Mother     Heart attack Father     Heart disease Father      Hypertension Father     Stroke Father     Diabetes unspecified Brother     Depression Brother     Mental illness Brother     Diabetes unspecified Brother     Diabetes unspecified Maternal Grandmother     Diabetes unspecified Paternal Grandmother     Diabetes unspecified Paternal Uncle     ADD / ADHD Cousin     Colon cancer Neg Hx     Colon polyps Neg Hx          Above history personally reviewed.       EXAM    Vitals:Blood pressure 122/90, pulse 78, temperature (!) 96.7 °F (35.9 °C), temperature source Temporal, resp. rate 16, height 6' (1.829 m), weight (!) 137 kg (302 lb), SpO2 97%.,Body mass index is 40.96 kg/m².     Physical Exam  Constitutional:       General: He is not in acute distress.     Appearance: He is well-developed. He is obese. He is not diaphoretic.   Eyes:      General:         Right eye: No discharge.         Left eye: No discharge.      Extraocular Movements: EOM normal.      Conjunctiva/sclera: Conjunctivae normal.      Pupils: Pupils are equal, round, and reactive to light.   Pulmonary:      Effort: Pulmonary effort is normal. No respiratory distress.   Abdominal:      General: There is no distension.      Tenderness: There is no abdominal tenderness.   Musculoskeletal:         General: Normal range of motion.      Cervical back: Normal range of motion and neck supple.   Skin:     General: Skin is warm and dry.   Neurological:      Mental Status: He is alert and oriented to person, place, and time. Mental status is at baseline.      Cranial Nerves: Cranial nerve deficit present.      Motor: Weakness present.      Coordination: Finger-Nose-Finger Test normal.      Deep Tendon Reflexes: Reflexes normal.   Psychiatric:         Speech: Speech normal.         Behavior: Behavior normal.         Thought Content: Thought content normal.         Judgment: Judgment normal.         Neurologic Exam     Mental Status   Oriented to person, place, and time.   Oriented to person.   Oriented to place.    Oriented to time. Oriented to year, month and date.   Speech: speech is normal   Level of consciousness: alert    Cranial Nerves     CN II   Right visual field deficit: upper temporal and lower temporal quadrant(s)  Left visual field deficit: lower temporal and upper temporal quadrant(s)    CN III, IV, VI   Pupils are equal, round, and reactive to light.  Extraocular motions are normal.   Right pupil: Size: 2 mm. Shape: regular. Reactivity: brisk. Consensual response: intact. Accommodation: intact.   Left pupil: Size: 2 mm. Shape: regular. Reactivity: brisk. Consensual response: intact. Accommodation: intact.   Nystagmus: none   Diplopia: none  Conjugate gaze: present    CN V   Right facial sensation deficit: none  Left facial sensation deficit: none    CN VII   Facial expression full, symmetric.     CN VIII   Hearing: intact    CN IX, X   Palate: symmetric    CN XI   Right sternocleidomastoid strength: normal  Left sternocleidomastoid strength: normal  Right trapezius strength: normal  Left trapezius strength: normal    CN XII   Tongue: not atrophic  Fasciculations: absent  Tongue deviation: none    Motor Exam   Muscle bulk: normal  Overall muscle tone: normal  Right arm pronator drift: absent  Left arm pronator drift: absent    Strength   Right deltoid: 5/5  Left deltoid: 5/5  Right biceps: 5/5  Left biceps: 5/5  Right triceps: 5/5  Left triceps: 5/5  Right quadriceps: 5/5  Left quadriceps: 5/5  Right hamstrin/5  Left hamstrin/5  Right anterior tibial: 5/5  Left anterior tibial: 5/5  Right posterior tibial: 5/5  Left posterior tibial: 5/5  Right peroneal: 5/5  Left peroneal: 5/5  Right gastroc: 5/5  Left gastroc: 5/5    Sensory Exam   Light touch normal.   Proprioception normal.     Gait, Coordination, and Reflexes     Coordination   Finger to nose coordination: normal    Tremor   Resting tremor: absent  Intention tremor: absent  Action tremor: absent        MEDICAL DECISION MAKING    Imaging Studies:      Complete PFT without post bronchodilator    Result Date: 2024  Narrative: Pulmonary Function Test Report Aristeo Hall 63 y.o. male MRN: [id number] Date of Testin2024 Date of Interpretation: 24 Requesting Physician: Yasmin Reason for Testing: Shortness of breath with exertion Reference set for interpretation: 2012 Procedure: The patient was taken to pulmonary function testing laboratory.  The patient demonstrated good effort and cooperation.  The results of this test meet ATS standards for acceptability and repeatability.  Data set appears appropriate for interpretation. Results: FEV1/FVC Ratio: 78% Forced Vital Capacity: 1.82 L (35% predicted) FEV1: 1.41 L (36% predicted) DLCO for patients hemoglobin level: 41% (40% when corrected for Hb) Interpretation: Spirometry shows a restrictive spirometric pattern There is a borderline moderate to severe diffusing impairment Volume loop is suboptimal in appearance and may suggest suboptimal effort. Clinical correlation is recommended prior to decision making based on the results of this test as it is suggestive of suboptimal effort.  I recommend repeating the entirety of this test. Juan Hernandez MD [facility]monary [facility] Critical [facility]    FL UPPER GI UGI    Result Date: 4/10/2024  Narrative: UPPER GI SERIES - SINGLE CONTRAST INDICATION: K22.4: Dyskinesia of esophagus Z48.815: Encounter for surgical aftercare following surgery on the digestive system Z98.84: Bariatric surgery status K91.2: Postsurgical malabsorption, not elsewhere classified E66.01: Morbid (severe) obesity due to excess calories K21.9: Gastro-esophageal reflux disease without esophagitis. COMPARISON: None TECHNIQUE: The patient was given barium by mouth and images of the esophagus, stomach, and small bowel were obtained. IMAGES: 231 FLUOROSCOPY TIME: 1.9 min FINDINGS: The patient started by drinking thin contrast in the upright position. The first few boluses of contrast  passed promptly through the gastroesophageal junction into the stomach. There was delayed passed contrast of subsequent boluses of contrast into the stomach,, and this contrast transiently remained in the lower esophagus. Expected size and shape of the stomach following sleeve gastrectomy. No extraluminal contrast. No findings to suggest ulceration. No mucosal thickening. Normal appearance of the proximal small bowel. No esophageal dysmotility. Normal primary stripping wave. No hiatal hernia. Small amount of gastroesophageal reflux.     Impression: Mild delayed passage of contrast into the stomach is most likely due to diminished volume of the stomach. No esophageal dysmotility. Mild gastroesophageal reflux. Workstation performed: [id number]     XR chest pa & lateral    Result Date: 4/1/2024  Narrative: XR CHEST PA & LATERAL INDICATION: chf. COMPARISON: 8/23/2023 FINDINGS: Linear opacities at both lung bases likely subsegmental atelectasis has developed No pneumothorax or pleural effusion. Normal cardiomediastinal silhouette. Bones are unremarkable for age. Neurostimulator electrodes mid thoracic termination, again evident Normal upper abdomen.     Impression: Development of suspected subsegmental atelectasis at both lung bases Workstation performed: [id number]     MRA head w wo contrast    Result Date: 3/31/2024  Narrative: MRA BRAIN WITH AND WITHOUT CONTRAST INDICATION:  Aneurysm evaluation COMPARISON: MRA brain 6/19/2017. TECHNIQUE:  Axial 3-D time-of-flight imaging with 3-D reconstructions performed with and without contrast.  Axial spoiled gradient 3-D post contrast in multiple phases. IV Contrast:  14 mL of Gadobutrol injection (SINGLE-DOSE) FINDINGS: IMAGE QUALITY:  Diagnostic. ANATOMY INTERNAL CAROTID ARTERIES:  Normal flow related enhancement of the distal cervical, petrous and cavernous segments of the internal carotid arteries.  Normal ICA terminus. ANTERIOR CIRCULATION: Redemonstrated prior coil  embolization of anterior communicating artery aneurysm. There is 4 mm focal enhancement along the posterior medial margin of the coil mass has enlarged from prior MRA (series 304 image 75). Severely hypoplastic right A1 segment.  Normal anterior communicating artery.  Normal flow-related enhancement of the anterior cerebral arteries. MIDDLE CEREBRAL ARTERY CIRCULATION:  The M1 segment and middle cerebral artery branches demonstrate normal flow-related enhancement. DISTAL VERTEBRAL ARTERIES:  Distal vertebral arteries are patent with a normal vertebrobasilar junction. Normal right PICA origin. Left PICA origin is not seen with possible common origin AICA/PICA, similar to prior exam BASILAR ARTERY:  Normal. POSTERIOR CEREBRAL ARTERIES:  Both posterior cerebral arteries arises from the basilar tip.  Both arteries demonstrate normal flow-related enhancement.  Normal posterior communicating arteries.     Impression: 1.  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 Increased size of 4 mm focal enhancement along the posteromedial margin of the coil mass suspicious for flow in the base of the aneurysm. Recommend further assessment with catheter angiogram. 2.  Patent major vessels of the [place] without high-grade stenosis. The study was marked in EPIC for significant notification. Workstation performed: [id number]     Echo complete w/ contrast if indicated    Result Date: 3/29/2024  Narrative:   Left Ventricle: Left ventricular cavity size is normal. Wall thickness is mildly increased. The left ventricular ejection fraction is 60% by biplane measurement. Systolic function is normal. Although no diagnostic regional wall motion abnormality was identified, this possibility cannot be completely excluded on the basis of this study. Diastolic function is normal.   Right Ventricle: Right ventricular cavity size is dilated. Systolic function is normal.   Right Atrium: The atrium is dilated.    Atrial Septum: No patent foramen ovale detected, confirmed by provocation with cough, using agitated saline contrast and with valsalva, using agitated saline contrast.   Aortic Valve: There is aortic valve sclerosis.   Aorta: The aortic root is normal in size. The ascending aorta is mildly dilated. The aortic root is 3.50 cm. The ascending aorta is 3.9 cm.     MRI brain w wo contrast    Result Date: 3/29/2024  Narrative: MRI BRAIN WITH AND WITHOUT CONTRAST INDICATION: stroke. Per epic chart review::Stroke-like symptoms.  63-year-old male with history of stroke in 2005 with residual visual field deficit, hx of cerebral aneurysm s/p coiling in 2003, chronic migraines, ARLEEN, CAD, COPD, chronic pain s/p spinal cord stimulator, HTN, DM2, dyslipidemia, h/o gastric bypass, CHF, Presented on 3/28 with right facial droop and right-sided weakness/numbness, present since evening of 3/27. Initial neuro exam with visual field deficit (inconsistent with confrontational testing but appeared to blink less to threat on right) diminished sensation R face, subtle R facial asymmetry, and RLE drift. Patient admits he has NOT been taking his ASA/Plavix on a regular basis. COMPARISON: CT stroke alert brain and CTA stroke alert head neck 3/28/2024. MRA head without contrast 6/19/2017. MRI brain neuro Quant with and without contrast 3/10/2017. MRI brain without contrast 11/20/2015. MRI brain plus MRA head without contrast 4/19/2006. MRI brain with and without contrast plus MRA head without contrast 2/15/2005. TECHNIQUE: Multiplanar, multisequence imaging of the brain was performed before and after gadolinium administration. IV Contrast:  14 mL of Gadobutrol injection (SINGLE-DOSE) IMAGE QUALITY:   Diagnostic. FINDINGS: BRAIN PARENCHYMA:  There is no discrete mass, mass effect or midline shift. No acute intracranial hemorrhage. No acute infarction. 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 A few small  scattered hyperintensities on T2/FLAIR imaging are noted in the periventricular and subcortical white matter demonstrating an appearance that is statistically most likely to represent minimal microangiopathic change. Postcontrast imaging of the brain demonstrates no abnormal enhancement. VENTRICLES:  Normal for the patient's age. SELLA AND PITUITARY GLAND:  Normal. ORBITS:  Normal. PARANASAL SINUSES: Mild mucosal thickening in bilateral maxillary sinuses (left worse than right). 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 Difficult to evaluate for residual or recurrent aneurysm on this study. Otherwise, evaluation of the major intracranial vasculature demonstrates appropriate flow voids. Please see CTA stroke alert head and neck 3/28/2024 for further evaluation of the intracranial vessels. CALVARIUM AND SKULL BASE:  Normal. EXTRACRANIAL SOFT TISSUES:  Normal.     Impression: No acute intracranial abnormality or abnormal enhancement. 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 Difficult to evaluate for residual or recurrent aneurysm on this study. Recommend consultation with the [facility], a division of [facility] at [phone]. Workstation performed: [id number]     XR follow up    Result Date: 3/29/2024  Narrative: Radiographs obtained demonstrates continuity of the spinal stimulator leads. Pain pump in the left lateral hip region in adequate orientation regarding the z-axis of the spine. Workstation performed: [id number]     CT stroke alert brain    Result Date: 3/28/2024  Narrative: CT BRAIN - STROKE ALERT PROTOCOL INDICATION:   Stroke Alert. COMPARISON: CT head from 1/21/2022, MRA of the head from 6/19/2017, MRI of the brain from 3/10/2017  TECHNIQUE:  CT examination of the brain was performed.  In addition to axial images, coronal reformatted images were created and submitted for interpretation. Radiation dose length product (DLP) for this visit:  923.62 mGy-cm .  This examination, like all CT scans performed in the [facility], was performed utilizing techniques to minimize radiation dose exposure, including the use of iterative  reconstruction and automated exposure control. IMAGE QUALITY:  Diagnostic. FINDINGS: PARENCHYMA: Sequelae of anterior communicating artery aneurysm coiling with streak artifact limiting evaluation of the bilateral anterior inferior frontal lobes, bilateral temporal lobes and upper brainstem. Within these limits no acute vascular territory infarct or  acute intracranial hemorrhage. Hypoattenuation within the periventricular and deep white matter, suggestive of mild microangiopathic changes in this age group. No intracranial mass, mass effect or midline shift. VENTRICLES AND EXTRA-AXIAL SPACES:  Normal for the patient's age. VISUALIZED ORBITS: Normal visualized orbits. PARANASAL SINUSES: Minimal mucosal thickening of the inferior maxillary sinuses. CALVARIUM AND EXTRACRANIAL SOFT TISSUES:   Normal.     Impression: -Within the limitations of streak artifact from anterior communicating artery aneurysm coiling no vascular territory infarct or intracranial hemorrhage. 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 I personally communicated the findings via telephone with Cruz Curry  at 11:05 a.m. on 3/28/2024. Workstation performed: [id number]     CTA stroke alert (head/neck)    Result Date: 3/28/2024  Narrative: CTA NECK AND BRAIN WITH CONTRAST INDICATION: Stroke Alert COMPARISON:   MRA of the head from 6/19/2017, CT of the cervical spine from 1/21/2022, CTA head and neck from 7/26/2005 TECHNIQUE:   Post contrast imaging was performed after administration of iodinated contrast through the neck and  brain. Post contrast axial 0.625 mm images timed to opacify the arterial system. 3D rendering was performed on an independent workstation.   MIP reconstructions performed. Coronal reconstructions were performed of the noncontrast portion of the brain. Radiation dose length product (DLP) for this visit:  454.64 mGy-cm .  This examination, like all CT scans performed in the [facility], was performed utilizing techniques to minimize radiation dose exposure, including the use of iterative  reconstruction and automated exposure control. IV Contrast: IMAGE QUALITY:   Diagnostic FINDINGS: CERVICAL VASCULATURE AORTIC ARCH AND GREAT VESSELS: Mild atherosclerotic disease of the aortic arch. Normal proximal great vessels and visualized subclavian vessels.  No significant stenosis. RIGHT VERTEBRAL ARTERY CERVICAL SEGMENT:  Normal origin. The vessel is normal in caliber throughout the neck. LEFT VERTEBRAL ARTERY CERVICAL SEGMENT: Vertebral artery is occluded from its origin through the mid V2 segment where there is reconstitution of the hypoplastic distal V2 and V3 segments. RIGHT EXTRACRANIAL CAROTID SEGMENT: Mild atherosclerotic disease of the distal common carotid artery and proximal cervical internal carotid artery without significant stenosis compared to the more distal ICA. LEFT EXTRACRANIAL CAROTID SEGMENT: Mild atherosclerotic disease of the distal common carotid artery and proximal cervical internal carotid artery without significant stenosis compared to the more distal ICA. NASCET criteria was used to determine the degree of internal carotid artery diameter stenosis. INTRACRANIAL VASCULATURE INTERNAL CAROTID ARTERIES: Atherosclerotic calcifications of the cavernous and clinoid segments of the ICAs. The left intracranial ICA is slightly larger than the right. No significant stenosis of the left intracranial ICA. Mild to moderate luminal narrowing of the right clinoid segment. Right supraclinoid segment  and ICA terminus are not well evaluated due to streak artifact. ANTERIOR CIRCULATION: A1 segments of the ACAs are not evaluated due to streak artifact from anterior communicating artery aneurysm coiling. Redemonstrated enhancement along the medial margin of the coil possibly reflecting volume averaging from A2 segment of the anterior cerebral artery versus residual aneurysm, unchanged. Proximal A2 segments are not visualized, otherwise normal enhancement of the more distal VICKEY branches. MIDDLE CEREBRAL ARTERY CIRCULATION: Proximal right M1 segment of the MCA cannot is grossly patent however suboptimally evaluated due to streak artifact. Otherwise bilateral M1 segments and middle cerebral artery branches demonstrate normal enhancement. DISTAL VERTEBRAL ARTERIES: Atherosclerotic calcifications of bilateral intradural vertebral arteries which are patent. Moderate to severe stenosis of the proximal right and moderate stenosis of the proximal left intradural vertebral arteries.  Posterior inferior cerebellar artery origins are normal. BASILAR ARTERY: Streak artifact somewhat limits evaluation of the basilar tip, otherwise the basilar artery demonstrates normal enhancement.  Normal superior cerebellar arteries. POSTERIOR CEREBRAL ARTERIES: Both posterior cerebral arteries arises from the basilar tip.  Both arteries demonstrate normal enhancement.   Suboptimal evaluation of bilateral posterior communicating arteries due to streak artifact. VENOUS STRUCTURES: The dural venous sinuses are patent. NON VASCULAR ANATOMY BONY STRUCTURES:  No acute osseous abnormality within the limitations of high image noise involving the lower cervical/upper thoracic spine.. SOFT TISSUES OF THE NECK:  Normal. THORACIC INLET: Secretions within the dependent trachea.     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 Possible enhancement along the medial margin of the coil mass which may  represent volume averaging from A2 segment versus residual aneurysm, unchanged. MRA with and without contrast can be obtained for further evaluation. -Bilateral A1 segments, proximal A2 segments of the ACAs, right ICA terminus is not well evaluated due to streak artifact. -Moderate to severe right and moderate left stenosis of the proximal intradural vertebral arteries due to atherosclerosis. 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 There is distal reconstitution of the hypoplastic vertebral artery. -Otherwise no high-grade stenosis, dissection or aneurysm involving the carotid or right vertebral arteries I personally communicated the findings via telephone with Cruz Curry  at 11:05 a.m. on 3/28/2024. Workstation performed: [id number]     POCT spirometry    Impression: Difficult to interpret but FEV1 was only 12% likely severe obstructive lung defect      I have personally reviewed pertinent reports.   and I have personally reviewed pertinent films in PACS

## 2024-04-16 ENCOUNTER — HOSPITAL ENCOUNTER (OUTPATIENT)
Dept: GASTROENTEROLOGY | Facility: AMBULATORY SURGERY CENTER | Age: 64
Discharge: HOME | End: 2024-04-16

## 2024-04-16 VITALS
SYSTOLIC BLOOD PRESSURE: 168 MMHG | DIASTOLIC BLOOD PRESSURE: 94 MMHG | HEART RATE: 82 BPM | RESPIRATION RATE: 24 BRPM | OXYGEN SATURATION: 96 %

## 2024-04-16 DIAGNOSIS — K91.2 POSTSURGICAL MALABSORPTION: ICD-10-CM

## 2024-04-16 DIAGNOSIS — Z98.84 BARIATRIC SURGERY STATUS: ICD-10-CM

## 2024-04-16 DIAGNOSIS — K22.4 SPASM OF ESOPHAGUS: ICD-10-CM

## 2024-04-16 DIAGNOSIS — E66.01 OBESITY, CLASS III, BMI 40-49.9 (MORBID OBESITY) (HCC): ICD-10-CM

## 2024-04-16 DIAGNOSIS — Z48.815 ENCOUNTER FOR SURGICAL AFTERCARE FOLLOWING SURGERY OF DIGESTIVE SYSTEM: ICD-10-CM

## 2024-04-16 DIAGNOSIS — K21.9 GERD (GASTROESOPHAGEAL REFLUX DISEASE): ICD-10-CM

## 2024-04-16 NOTE — PERIOPERATIVE NURSING NOTE
Patient brought in room and educated on procedure.  PH 44 probe inserted via left nostril and secured. Zephr monitor teach back done and patient verbalized understanding. Discharg instructions given to patient and instructed to return the next day to have the probe remove. Patient left in stable condition.

## 2024-04-16 NOTE — PROGRESS NOTES
Neurosurgery Office Note  Aristeo Hall 63 y.o. male MRN: [id number]      Assessment/Plan     Cerebral aneurysm  Presents for follow up of residual AcoA aneurysm s/p coil embolization  S/p coil embolization for incidental AcoA aneurysm in 2004 at [facility].  Hospitalized 3/28 at [facility] for R sided weakness, numbness and R facial droop, then re-admitted 4/1 w/ c/o worsening dizziness in setting of updated MRA indicating residual aneurysm.  Continues to endorse constant dizziness and blurred vision since hospitalization.  Blood pressure is well-controlled on Norvasc, Coreg and Losartan.  No smoking history.  Unknown family history.  On exam, with blurred vision diplopia/difficulty reading at times. Bilateral superior and inferior temporal field vision loss. Otherwise non-focal. Pain inhibited motor weakness.    Imaging:  MRA head w/wo, 3/31/2024: 1.  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 Increased size of 4 mm focal enhancement along the posteromedial margin of the coil mass suspicious for flow in the base of the aneurysm. Recommend further assessment with catheter angiogram. 2.  Patent major vessels of the [place] without high-grade stenosis.    Plan:  Reviewed imaging extensively with patient.  Recommend proceeding with formal angiogram in setting of residual aneurysm.  Maintain normotension.  Follow up with neurology as scheduled in June.  Discussed symptoms of aneurysm rupture that would prompt emergent evaluation including severe, sudden onset headache, N/V, seizure.  Follow up as scheduled for angiogram.    Status post insertion of spinal cord stimulator  S/p medtronic thoracic SCS placed 3/17/2021 by Dr. Cho.       Diagnoses and all orders for this visit:    Cerebral aneurysm    Status post insertion of spinal cord stimulator          I have spent a total time of 40 minutes on 04/15/24 in caring for this patient including Diagnostic results, Risks and  benefits of tx options, Instructions for management, Patient and family education, Importance of tx compliance, Risk factor reductions, Impressions, Counseling / Coordination of care, Documenting in the medical record, Reviewing / ordering tests, medicine, procedures  , and Obtaining or reviewing history  .      CHIEF COMPLAINT    Chief Complaint   Patient presents with    Follow-up     Hospital follow up, with MRA       HISTORY    History of Present Illness     63 y.o. year old male     With past medical history of prior stroke in 2004 with residual field deficit, chronic migraines, ARLEEN, obesity, CAD status post MI and 6 cardiac stent placements, COPD, hypertension, type 2 diabetes, hyperlipidemia, CHF, prior gastric sleeve, chronic pain status post spinal cord stimulator placement and intrathecal pump, status post ACOM aneurysm coil embolization in 2004 at [facility], who presents for follow-up evaluation after hospitalization with updated imaging indicating residual ACOM aneurysm.    On 3/28 he presented to the [facility] emergency department with complaint of right facial droop and right-sided weakness as well as dizziness.  He was admitted on the stroke pathway.  MRI was negative for acute stroke.  He was discharged on 3/31 on aspirin and Plavix with a loop recorder and MRA was completed as well.  Results of MRA were given after patient was discharged and when he was found with results he expressed concern for ongoing symptoms of dizziness and imbalance.  Patient's PCP sent him to the ER for follow-up evaluation on 4/1.  At that time he was evaluated by our service and recommended for outpatient follow-up in 2 weeks.  He presents today for that appointment.    He continues to endorse significant ongoing symptoms including headaches, blurred vision, diplopia, difficulty reading, imbalance and generalized pain.  He relates that the symptoms of blurred vision and headaches have been ongoing  since he was discharged from the hospital.  He also continues to endorse some dizziness.  He has not sustained any falls.  He ambulates at baseline with a walker.        See Discussion    REVIEW OF SYSTEMS    Review of Systems   HENT:  Positive for tinnitus.    Eyes:  Positive for visual disturbance (blurry vision).   Respiratory:  Positive for chest tightness and shortness of breath.    Gastrointestinal:  Positive for constipation, nausea and vomiting.   Genitourinary:  Positive for difficulty urinating.   Musculoskeletal:  Positive for gait problem (unsteady, using rolling walker).        10/10 generalized pain, everywhere   Neurological:  Positive for dizziness, weakness and headaches (7/10 today). Negative for seizures.   Hematological:  Bruises/bleeds easily (meds).   Psychiatric/Behavioral:  Positive for sleep disturbance.        ROS obtained by MA. Reviewed. See HPI.     Meds/Allergies     Current Outpatient Medications   Medication Sig Dispense Refill    albuterol (2.5 mg/3 mL) 0.083 % nebulizer solution Take 3 mL (2.5 mg total) by nebulization every 6 (six) hours as needed for wheezing or shortness of breath 720 mL 0    amLODIPine (NORVASC) 5 mg tablet TAKE 1 TABLET (5 MG TOTAL) BY MOUTH DAILY. 90 tablet 0    ammonium lactate (LAC-HYDRIN) 12 % cream Apply topically as needed for dry skin 385 g 2    aspirin 81 mg chewable tablet Chew 1 tablet (81 mg total) daily 90 tablet 1    atorvastatin (LIPITOR) 80 mg tablet TAKE 1 TABLET (80 MG TOTAL) BY MOUTH DAILY AFTER DINNER 90 tablet 0    baclofen 10 mg tablet Take 10 mg by mouth 3 (three) times a day      busPIRone (BUSPAR) 5 mg tablet Take 5 mg by mouth 2 (two) times a day      CALCIUM PO Take 1 tablet by mouth daily      carvedilol (COREG) 12.5 mg tablet TAKE 1 TABLET BY MOUTH TWICE A DAY WITH FOOD 60 tablet 3    clopidogrel (PLAVIX) 75 mg tablet TAKE 1 TABLET BY MOUTH EVERY DAY 90 tablet 0    Continuous Blood Gluc  (Dexcom G6 ) CANDACE 1 DEXCOM G6   FOR CONTINUOUS GLUCOSE MONITORING 1 each 0    Continuous Blood Gluc Sensor (Dexcom G6 Sensor) MISC 3 PACK SENSOR FOR CONTINUOUS GLUCOSE MONITORING 9 each 3    Continuous Blood Gluc Transmit (Dexcom G6 Transmitter) MISC 1 TRANSMITTED EVERY 3 MONTHS FOR CONTINUOUS GLUCOSE MONITORING 1 each 3    docusate sodium (COLACE) 100 mg capsule Take 1 capsule (100 mg total) by mouth 2 (two) times a day 180 capsule 3    Empagliflozin (JARDIANCE) 10 MG TABS tablet Take 1 tablet (10 mg total) by mouth every morning 30 tablet 11    ergocalciferol (VITAMIN D2) 50,000 units Take 1 capsule (50,000 Units total) by mouth 2 (two) times a week 24 capsule 0    Fluticasone-Salmeterol (Advair Diskus) 500-50 mcg/dose inhaler Inhale 1 puff 2 (two) times a day Rinse mouth after use 60 blister 3    folic acid (FOLVITE) 1 mg tablet Take 1 tablet (1 mg total) by mouth daily 90 tablet 1    furosemide (LASIX) 40 mg tablet Take once in AM daily. Take once daily in PM PRN weight gain, edema. 180 tablet 3    gabapentin (NEURONTIN) 800 mg tablet Take 800 mg by mouth 4 (four) times a day      hydrocortisone 1 % lotion Apply topically if needed for rash 59 mL 1    Klor-Con M20 20 MEQ tablet TAKE 1 TABLET BY MOUTH EVERY DAY 90 tablet 0    lidocaine (LIDODERM) 5 % Apply 1 patch topically daily back      losartan (COZAAR) 50 mg tablet TAKE 1 TABLET BY MOUTH EVERY DAY 90 tablet 1    methocarbamol (ROBAXIN) 750 mg tablet TAKE 1 TABLET (750 MG TOTAL) BY MOUTH EVERY 6 (SIX) HOURS AS NEEDED FOR MUSCLE SPASMS 120 tablet 0    naloxegol oxalate (MOVANTIK) 25 MG tablet Take 1 tablet (25 mg total) by mouth daily in the early morning 30 tablet 5    nitroglycerin (Nitrostat) 0.4 mg SL tablet Place 1 tablet (0.4 mg total) under the tongue every 5 (five) minutes as needed for chest pain (pt has not  used recently) 30 tablet 0    nystatin (MYCOSTATIN) cream Apply 2 g (1 Application total) topically 2 (two) times a day 15 g 0    omeprazole (PriLOSEC) 40 MG capsule  Take 1 capsule (40 mg total) by mouth 2 (two) times a day before meals 60 capsule 5    ondansetron (ZOFRAN) 4 mg tablet Take 1 tablet (4 mg total) by mouth every 8 (eight) hours as needed for nausea or vomiting 60 tablet 3    predniSONE 10 mg tablet Take 2 a day for 1 week then 1 a day for 14 days 28 tablet 0    ranolazine (RANEXA) 1000 MG SR tablet TAKE 1 TABLET (1,000 MG TOTAL) BY MOUTH EVERY 12 HOURS 60 tablet 3    tamsulosin (FLOMAX) 0.4 mg TAKE 1 CAPSULE BY MOUTH EVERY DAY WITH DINNER 90 capsule 1    traZODone (DESYREL) 100 mg tablet Take 1 tablet (100 mg total) by mouth daily at bedtime 30 tablet 0    vitamin B-12 (VITAMIN B-12) 1,000 mcg tablet Take 1 tablet (1,000 mcg total) by mouth daily 90 tablet 3    Cyanocobalamin (VITAMIN B-12 PO) Take 1 tablet by mouth daily       No current facility-administered medications for this visit.       No Known Allergies    PAST HISTORY    Past Medical History:   Diagnosis Date    Acute on chronic diastolic congestive heart failure (HCC)     Altered gait     Alzheimer disease (HCC)     per patients,,early onset     Angina pectoris (HCC)     Anxiety     Arthritis     Brain aneurysm     coils placed    Cardiac disease     Chest pain 01/13/2016    Chronic kidney disease     Chronic pain     back/ right groin and rle- has morphine pump    Constipation     COPD (chronic obstructive pulmonary disease) (HCC)     Coronary artery disease     CPAP (continuous positive airway pressure) dependence     Decubital ulcer     sacral decub-occured 5/2019-sees wound care/debide in OR today 6/6/2019    Dependent on walker for ambulation     w/c for long distance    Depression     Diabetes mellitus (HCC)     insulin dependent    Dizziness     occ    Dysphagia     Enlarged prostate     Esophageal stricture In file    Esophageal varices (HCC)     Fall     Fall at home 05/03/2019    GERD (gastroesophageal reflux disease)     Heart failure (HCC)     Hiatal hernia     Hx of gastric bypass 11/19/2018     Hypercholesterolemia     Hypertension     MI (myocardial infarction) ([facility])     2017- stents x2    Migraine     Morbid obesity ([facility])     gastric bypass sleeve 11/2018-wt loss 125 lb    Neuropathy     Oxygen dependent     Q HS  2LPM with CPAP and prn during day 2-3 LPM     Pressure injury of skin     Pressure injury of skin of sacral region 06/03/2019    Added automatically from request for surgery [id number]    Renal disorder     Shortness of breath     Skin abnormality     sacral wound - covered with pad    Sleep apnea     Stented coronary artery     Stroke ([facility])     vision loss b/l  2005, residual R leg weakness    Type 2 diabetes mellitus with diabetic neuropathy, with long-term current use of insulin ([facility]) 10/18/2019    Type 2 diabetes mellitus with renal complication ([facility])     insulin dependent    Type 2 diabetes mellitus, with long-term current use of insulin ([facility]) 10/11/2017    Transitioned From: Diabetes mellitus type 2, uncontrolled    Urinary frequency     Use of cane as ambulatory aid     Wears dentures     Wears glasses     Wears glasses     Wheelchair dependent        Past Surgical History:   Procedure Laterality Date    BACK SURGERY      BRAIN SURGERY      CARDIAC CATHETERIZATION      with stents    CARDIAC CATHETERIZATION N/A 8/18/2023    Procedure: Cardiac Coronary Angiogram;  Surgeon: Horacio Weiner MD;  Location: [facility];  Service: Cardiology    CEREBRAL ANEURYSM REPAIR      with coils    COLONOSCOPY      ESOPHAGOGASTRODUODENOSCOPY N/A 7/1/2016    Procedure: ESOPHAGOGASTRODUODENOSCOPY (EGD);  Surgeon: Reno Christiansen MD;  Location: [facility];  Service:     GASTRIC BYPASS  11/19/2018    HERNIA REPAIR      HIATAL HERNIA REPAIR      INFUSION PUMP IMPLANTATION Left     morphine    INTRATHECAL PUMP IMPLANTATION Left 7/9/2020    Procedure: REVISION INTRATHECAL PAIN PUMP POCKET, LEFT ABDOMEN;  Surgeon: Homero Cho MD;  Location: [facility];  Service: Neurosurgery    KNEE ARTHROSCOPY Right     KNEE  ARTHROSCOPY Right     PERONEAL NERVE DECOMPRESSION Right     NC DEBRIDEMENT OPEN WOUND FIRST 20 SQ CM/< N/A 6/6/2019    Procedure: EXCISIONAL DEBRIDEMENT OF SACRAL DECUBITUS ULCER;  Surgeon: Siddhartha Omer MD;  Location: [facility];  Service: General    NC ESOPHAGOGASTRODUODENOSCOPY TRANSORAL DIAGNOSTIC N/A 2/27/2017    Procedure: ESOPHAGOGASTRODUODENOSCOPY (EGD);  Surgeon: Reno Christiansen MD;  Location: [facility];  Service: Gastroenterology    NC ESOPHAGOGASTRODUODENOSCOPY TRANSORAL DIAGNOSTIC N/A 8/23/2018    Procedure: ESOPHAGOGASTRODUODENOSCOPY (EGD) with biopsy;  Surgeon: Reno Christiansen MD;  [facility]: [facility];  Service: Gastroenterology    NC IMPLTJ/RPLCMT ITHCL/EDRL DRUG NFS PRGRBL PUMP Left 10/13/2020    Procedure: EXPLORATION OF INTRATHECAL PAIN PUMP SYSTEM INTEGRITY FOR POSSIBLE REPLACEMENT OF CATHETER AND PUMP.;  Surgeon: Homero Cho MD;  Location: [facility];  Service: Neurosurgery    NC IMPLTJ/RPLCMT ITHCL/EDRL DRUG NFS SUBQ RSVR N/A 1/19/2017    Procedure: REMOVAL / EXCHANGE INTRATHECAL PAIN PUMP;  Surgeon: Que Leonard MD;  [facility]: [facility];  Service: Orthopedics    NC IMPLTJ/RPLCMT ITHCL/EDRL DRUG NFS SUBQ RSVR N/A 5/16/2016    Procedure: REPLACEMENT AND PROGRAM PUMP ;  Surgeon: Que Leonard MD;  [facility];  Service: Orthopedics    NC PRQ IMPLTJ NSTIM ELECTRODE ARRAY EPIDURAL Right 3/17/2021    Procedure: INSERTION THORACIC DORSAL COLUMN SPINAL CORD STIMULATOR PERCUTANEOUS W IMPLANTABLE PULSE GENERATOR, RIGHT;  Surgeon: Homero Cho MD;  Location[facility];  Service: Neurosurgery       Social History     Tobacco Use    Smoking status: Never    Smokeless tobacco: Never   Vaping Use    Vaping status: Never Used   Substance Use Topics    Alcohol use: Never    Drug use: Never     Types: Marijuana     Comment: Medical card       Family History   Problem Relation Age of Onset    Diabetes unspecified Mother     Diabetes Mother     Heart attack Father     Heart disease Father      Hypertension Father     Stroke Father     Diabetes unspecified Brother     Depression Brother     Mental illness Brother     Diabetes unspecified Brother     Diabetes unspecified Maternal Grandmother     Diabetes unspecified Paternal Grandmother     Diabetes unspecified Paternal Uncle     ADD / ADHD Cousin     Colon cancer Neg Hx     Colon polyps Neg Hx          Above history personally reviewed.       EXAM    Vitals:Blood pressure 122/90, pulse 78, temperature (!) 96.7 °F (35.9 °C), temperature source Temporal, resp. rate 16, height 6' (1.829 m), weight (!) 137 kg (302 lb), SpO2 97%.,Body mass index is 40.96 kg/m².     Physical Exam  Constitutional:       General: He is not in acute distress.     Appearance: He is well-developed. He is obese. He is not diaphoretic.   Eyes:      General:         Right eye: No discharge.         Left eye: No discharge.      Extraocular Movements: EOM normal.      Conjunctiva/sclera: Conjunctivae normal.      Pupils: Pupils are equal, round, and reactive to light.   Pulmonary:      Effort: Pulmonary effort is normal. No respiratory distress.   Abdominal:      General: There is no distension.      Tenderness: There is no abdominal tenderness.   Musculoskeletal:         General: Normal range of motion.      Cervical back: Normal range of motion and neck supple.   Skin:     General: Skin is warm and dry.   Neurological:      Mental Status: He is alert and oriented to person, place, and time. Mental status is at baseline.      Cranial Nerves: Cranial nerve deficit present.      Motor: Weakness present.      Coordination: Finger-Nose-Finger Test normal.      Deep Tendon Reflexes: Reflexes normal.   Psychiatric:         Speech: Speech normal.         Behavior: Behavior normal.         Thought Content: Thought content normal.         Judgment: Judgment normal.         Neurologic Exam     Mental Status   Oriented to person, place, and time.   Oriented to person.   Oriented to place.    Oriented to time. Oriented to year, month and date.   Speech: speech is normal   Level of consciousness: alert    Cranial Nerves     CN II   Right visual field deficit: upper temporal and lower temporal quadrant(s)  Left visual field deficit: lower temporal and upper temporal quadrant(s)    CN III, IV, VI   Pupils are equal, round, and reactive to light.  Extraocular motions are normal.   Right pupil: Size: 2 mm. Shape: regular. Reactivity: brisk. Consensual response: intact. Accommodation: intact.   Left pupil: Size: 2 mm. Shape: regular. Reactivity: brisk. Consensual response: intact. Accommodation: intact.   Nystagmus: none   Diplopia: none  Conjugate gaze: present    CN V   Right facial sensation deficit: none  Left facial sensation deficit: none    CN VII   Facial expression full, symmetric.     CN VIII   Hearing: intact    CN IX, X   Palate: symmetric    CN XI   Right sternocleidomastoid strength: normal  Left sternocleidomastoid strength: normal  Right trapezius strength: normal  Left trapezius strength: normal    CN XII   Tongue: not atrophic  Fasciculations: absent  Tongue deviation: none    Motor Exam   Muscle bulk: normal  Overall muscle tone: normal  Right arm pronator drift: absent  Left arm pronator drift: absent    Strength   Right deltoid: 5/5  Left deltoid: 5/5  Right biceps: 5/5  Left biceps: 5/5  Right triceps: 5/5  Left triceps: 5/5  Right quadriceps: 5/5  Left quadriceps: 5/5  Right hamstrin/5  Left hamstrin/5  Right anterior tibial: 5/5  Left anterior tibial: 5/5  Right posterior tibial: 5/5  Left posterior tibial: 5/5  Right peroneal: 5/5  Left peroneal: 5/5  Right gastroc: 5/5  Left gastroc: 5/5    Sensory Exam   Light touch normal.   Proprioception normal.     Gait, Coordination, and Reflexes     Coordination   Finger to nose coordination: normal    Tremor   Resting tremor: absent  Intention tremor: absent  Action tremor: absent        MEDICAL DECISION MAKING    Imaging Studies:      Complete PFT without post bronchodilator    Result Date: 2024  Narrative: Pulmonary Function Test Report Aristeo Hall 63 y.o. male MRN: [id number] Date of Testin2024 Date of Interpretation: 24 Requesting Physician: Yasmin Reason for Testing: Shortness of breath with exertion Reference set for interpretation: 2012 Procedure: The patient was taken to pulmonary function testing laboratory.  The patient demonstrated good effort and cooperation.  The results of this test meet ATS standards for acceptability and repeatability.  Data set appears appropriate for interpretation. Results: FEV1/FVC Ratio: 78% Forced Vital Capacity: 1.82 L (35% predicted) FEV1: 1.41 L (36% predicted) DLCO for patients hemoglobin level: 41% (40% when corrected for Hb) Interpretation: Spirometry shows a restrictive spirometric pattern There is a borderline moderate to severe diffusing impairment Volume loop is suboptimal in appearance and may suggest suboptimal effort. Clinical correlation is recommended prior to decision making based on the results of this test as it is suggestive of suboptimal effort.  I recommend repeating the entirety of this test. Juan Hernandez MD [facility]    FL UPPER GI UGI    Result Date: 4/10/2024  Narrative: UPPER GI SERIES - SINGLE CONTRAST INDICATION: K22.4: Dyskinesia of esophagus Z48.815: Encounter for surgical aftercare following surgery on the digestive system Z98.84: Bariatric surgery status K91.2: Postsurgical malabsorption, not elsewhere classified E66.01: Morbid (severe) obesity due to excess calories K21.9: Gastro-esophageal reflux disease without esophagitis. COMPARISON: None TECHNIQUE: The patient was given barium by mouth and images of the esophagus, stomach, and small bowel were obtained. IMAGES: 231 FLUOROSCOPY TIME: 1.9 min FINDINGS: The patient started by drinking thin contrast in the upright position. The first few boluses of contrast  passed promptly through the gastroesophageal junction into the stomach. There was delayed passed contrast of subsequent boluses of contrast into the stomach,, and this contrast transiently remained in the lower esophagus. Expected size and shape of the stomach following sleeve gastrectomy. No extraluminal contrast. No findings to suggest ulceration. No mucosal thickening. Normal appearance of the proximal small bowel. No esophageal dysmotility. Normal primary stripping wave. No hiatal hernia. Small amount of gastroesophageal reflux.     Impression: Mild delayed passage of contrast into the stomach is most likely due to diminished volume of the stomach. No esophageal dysmotility. Mild gastroesophageal reflux. Workstation performed: [id number]     XR chest pa & lateral    Result Date: 4/1/2024  Narrative: XR CHEST PA & LATERAL INDICATION: chf. COMPARISON: 8/23/2023 FINDINGS: Linear opacities at both lung bases likely subsegmental atelectasis has developed No pneumothorax or pleural effusion. Normal cardiomediastinal silhouette. Bones are unremarkable for age. Neurostimulator electrodes mid thoracic termination, again evident Normal upper abdomen.     Impression: Development of suspected subsegmental atelectasis at both lung bases Workstation performed: [id number]     MRA head w wo contrast    Result Date: 3/31/2024  Narrative: MRA BRAIN WITH AND WITHOUT CONTRAST INDICATION:  Aneurysm evaluation COMPARISON: MRA brain 6/19/2017. TECHNIQUE:  Axial 3-D time-of-flight imaging with 3-D reconstructions performed with and without contrast.  Axial spoiled gradient 3-D post contrast in multiple phases. IV Contrast:  14 mL of Gadobutrol injection (SINGLE-DOSE) FINDINGS: IMAGE QUALITY:  Diagnostic. ANATOMY INTERNAL CAROTID ARTERIES:  Normal flow related enhancement of the distal cervical, petrous and cavernous segments of the internal carotid arteries.  Normal ICA terminus. ANTERIOR CIRCULATION: Redemonstrated prior coil  embolization of anterior communicating artery aneurysm. There is 4 mm focal enhancement along the posterior medial margin of the coil mass has enlarged from prior MRA (series 304 image 75). Severely hypoplastic right A1 segment.  Normal anterior communicating artery.  Normal flow-related enhancement of the anterior cerebral arteries. MIDDLE CEREBRAL ARTERY CIRCULATION:  The M1 segment and middle cerebral artery branches demonstrate normal flow-related enhancement. DISTAL VERTEBRAL ARTERIES:  Distal vertebral arteries are patent with a normal vertebrobasilar junction. Normal right PICA origin. Left PICA origin is not seen with possible common origin AICA/PICA, similar to prior exam BASILAR ARTERY:  Normal. POSTERIOR CEREBRAL ARTERIES:  Both posterior cerebral arteries arises from the basilar tip.  Both arteries demonstrate normal flow-related enhancement.  Normal posterior communicating arteries.     Impression: 1.  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 Increased size of 4 mm focal enhancement along the posteromedial margin of the coil mass suspicious for flow in the base of the aneurysm. Recommend further assessment with catheter angiogram. 2.  Patent major vessels of the [place] of [place] without high-grade stenosis. The study was marked in EPIC for significant notification. Workstation performed: [id number]     Echo complete w/ contrast if indicated    Result Date: 3/29/2024  Narrative:   Left Ventricle: Left ventricular cavity size is normal. Wall thickness is mildly increased. The left ventricular ejection fraction is 60% by biplane measurement. Systolic function is normal. Although no diagnostic regional wall motion abnormality was identified, this possibility cannot be completely excluded on the basis of this study. Diastolic function is normal.   Right Ventricle: Right ventricular cavity size is dilated. Systolic function is normal.   Right Atrium: The atrium is dilated.    Atrial Septum: No patent foramen ovale detected, confirmed by provocation with cough, using agitated saline contrast and with valsalva, using agitated saline contrast.   Aortic Valve: There is aortic valve sclerosis.   Aorta: The aortic root is normal in size. The ascending aorta is mildly dilated. The aortic root is 3.50 cm. The ascending aorta is 3.9 cm.     MRI brain w wo contrast    Result Date: 3/29/2024  Narrative: MRI BRAIN WITH AND WITHOUT CONTRAST INDICATION: stroke. Per epic chart review::Stroke-like symptoms.  63-year-old male with history of stroke in 2005 with residual visual field deficit, hx of cerebral aneurysm s/p coiling in 2003, chronic migraines, ARLEEN, CAD, COPD, chronic pain s/p spinal cord stimulator, HTN, DM2, dyslipidemia, h/o gastric bypass, CHF, Presented on 3/28 with right facial droop and right-sided weakness/numbness, present since evening of 3/27. Initial neuro exam with visual field deficit (inconsistent with confrontational testing but appeared to blink less to threat on right) diminished sensation R face, subtle R facial asymmetry, and RLE drift. Patient admits he has NOT been taking his ASA/Plavix on a regular basis. COMPARISON: CT stroke alert brain and CTA stroke alert head neck 3/28/2024. MRA head without contrast 6/19/2017. MRI brain neuro Quant with and without contrast 3/10/2017. MRI brain without contrast 11/20/2015. MRI brain plus MRA head without contrast 4/19/2006. MRI brain with and without contrast plus MRA head without contrast 2/15/2005. TECHNIQUE: Multiplanar, multisequence imaging of the brain was performed before and after gadolinium administration. IV Contrast:  14 mL of Gadobutrol injection (SINGLE-DOSE) IMAGE QUALITY:   Diagnostic. FINDINGS: BRAIN PARENCHYMA:  There is no discrete mass, mass effect or midline shift. No acute intracranial hemorrhage. No acute infarction. 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 A few small  scattered hyperintensities on T2/FLAIR imaging are noted in the periventricular and subcortical white matter demonstrating an appearance that is statistically most likely to represent minimal microangiopathic change. Postcontrast imaging of the brain demonstrates no abnormal enhancement. VENTRICLES:  Normal for the patient's age. SELLA AND PITUITARY GLAND:  Normal. ORBITS:  Normal. PARANASAL SINUSES: Mild mucosal thickening in bilateral maxillary sinuses (left worse than right). 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 Difficult to evaluate for residual or recurrent aneurysm on this study. Otherwise, evaluation of the major intracranial vasculature demonstrates appropriate flow voids. Please see CTA stroke alert head and neck 3/28/2024 for further evaluation of the intracranial vessels. CALVARIUM AND SKULL BASE:  Normal. EXTRACRANIAL SOFT TISSUES:  Normal.     Impression: No acute intracranial abnormality or abnormal enhancement. 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 Difficult to evaluate for residual or recurrent aneurysm on this study. Recommend consultation with the [facility], a division of [facility] for Neuroscience at [phone]. Workstation performed: [id number]     XR follow up    Result Date: 3/29/2024  Narrative: Radiographs obtained demonstrates continuity of the spinal stimulator leads. Pain pump in the left lateral hip region in adequate orientation regarding the z-axis of the spine. Workstation performed: [id number]     CT stroke alert brain    Result Date: 3/28/2024  Narrative: CT BRAIN - STROKE ALERT PROTOCOL INDICATION:   Stroke Alert. COMPARISON: CT head from 1/21/2022, MRA of the head from 6/19/2017, MRI of the brain from 3/10/2017  TECHNIQUE:  CT examination of the brain was performed.  In addition to axial images, coronal reformatted images were created and submitted for interpretation. Radiation dose length product (DLP) for this visit:  923.62 mGy-cm .  This examination, like all CT scans performed in the [facility], was performed utilizing techniques to minimize radiation dose exposure, including the use of iterative  reconstruction and automated exposure control. IMAGE QUALITY:  Diagnostic. FINDINGS: PARENCHYMA: Sequelae of anterior communicating artery aneurysm coiling with streak artifact limiting evaluation of the bilateral anterior inferior frontal lobes, bilateral temporal lobes and upper brainstem. Within these limits no acute vascular territory infarct or  acute intracranial hemorrhage. Hypoattenuation within the periventricular and deep white matter, suggestive of mild microangiopathic changes in this age group. No intracranial mass, mass effect or midline shift. VENTRICLES AND EXTRA-AXIAL SPACES:  Normal for the patient's age. VISUALIZED ORBITS: Normal visualized orbits. PARANASAL SINUSES: Minimal mucosal thickening of the inferior maxillary sinuses. CALVARIUM AND EXTRACRANIAL SOFT TISSUES:   Normal.     Impression: -Within the limitations of streak artifact from anterior communicating artery aneurysm coiling no vascular territory infarct or intracranial hemorrhage. 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 I personally communicated the findings via telephone with Cruz Curry  at 11:05 a.m. on 3/28/2024. Workstation performed: [id number]     CTA stroke alert (head/neck)    Result Date: 3/28/2024  Narrative: CTA NECK AND BRAIN WITH CONTRAST INDICATION: Stroke Alert COMPARISON:   MRA of the head from 6/19/2017, CT of the cervical spine from 1/21/2022, CTA head and neck from 7/26/2005 TECHNIQUE:   Post contrast imaging was performed after administration of iodinated contrast through the neck and  brain. Post contrast axial 0.625 mm images timed to opacify the arterial system. 3D rendering was performed on an independent workstation.   MIP reconstructions performed. Coronal reconstructions were performed of the noncontrast portion of the brain. Radiation dose length product (DLP) for this visit:  454.64 mGy-cm .  This examination, like all CT scans performed in the [facility], was performed utilizing techniques to minimize radiation dose exposure, including the use of iterative  reconstruction and automated exposure control. IV Contrast: IMAGE QUALITY:   Diagnostic FINDINGS: CERVICAL VASCULATURE AORTIC ARCH AND GREAT VESSELS: Mild atherosclerotic disease of the aortic arch. Normal proximal great vessels and visualized subclavian vessels.  No significant stenosis. RIGHT VERTEBRAL ARTERY CERVICAL SEGMENT:  Normal origin. The vessel is normal in caliber throughout the neck. LEFT VERTEBRAL ARTERY CERVICAL SEGMENT: Vertebral artery is occluded from its origin through the mid V2 segment where there is reconstitution of the hypoplastic distal V2 and V3 segments. RIGHT EXTRACRANIAL CAROTID SEGMENT: Mild atherosclerotic disease of the distal common carotid artery and proximal cervical internal carotid artery without significant stenosis compared to the more distal ICA. LEFT EXTRACRANIAL CAROTID SEGMENT: Mild atherosclerotic disease of the distal common carotid artery and proximal cervical internal carotid artery without significant stenosis compared to the more distal ICA. NASCET criteria was used to determine the degree of internal carotid artery diameter stenosis. INTRACRANIAL VASCULATURE INTERNAL CAROTID ARTERIES: Atherosclerotic calcifications of the cavernous and clinoid segments of the ICAs. The left intracranial ICA is slightly larger than the right. No significant stenosis of the left intracranial ICA. Mild to moderate luminal narrowing of the right clinoid segment. Right supraclinoid segment  and ICA terminus are not well evaluated due to streak artifact. ANTERIOR CIRCULATION: A1 segments of the ACAs are not evaluated due to streak artifact from anterior communicating artery aneurysm coiling. Redemonstrated enhancement along the medial margin of the coil possibly reflecting volume averaging from A2 segment of the anterior cerebral artery versus residual aneurysm, unchanged. Proximal A2 segments are not visualized, otherwise normal enhancement of the more distal VICKEY branches. MIDDLE CEREBRAL ARTERY CIRCULATION: Proximal right M1 segment of the MCA cannot is grossly patent however suboptimally evaluated due to streak artifact. Otherwise bilateral M1 segments and middle cerebral artery branches demonstrate normal enhancement. DISTAL VERTEBRAL ARTERIES: Atherosclerotic calcifications of bilateral intradural vertebral arteries which are patent. Moderate to severe stenosis of the proximal right and moderate stenosis of the proximal left intradural vertebral arteries.  Posterior inferior cerebellar artery origins are normal. BASILAR ARTERY: Streak artifact somewhat limits evaluation of the basilar tip, otherwise the basilar artery demonstrates normal enhancement.  Normal superior cerebellar arteries. POSTERIOR CEREBRAL ARTERIES: Both posterior cerebral arteries arises from the basilar tip.  Both arteries demonstrate normal enhancement.   Suboptimal evaluation of bilateral posterior communicating arteries due to streak artifact. VENOUS STRUCTURES: The dural venous sinuses are patent. NON VASCULAR ANATOMY BONY STRUCTURES:  No acute osseous abnormality within the limitations of high image noise involving the lower cervical/upper thoracic spine.. SOFT TISSUES OF THE NECK:  Normal. THORACIC INLET: Secretions within the dependent trachea.     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 Possible enhancement along the medial margin of the coil mass which may  represent volume averaging from A2 segment versus residual aneurysm, unchanged. MRA with and without contrast can be obtained for further evaluation. -Bilateral A1 segments, proximal A2 segments of the ACAs, right ICA terminus is not well evaluated due to streak artifact. -Moderate to severe right and moderate left stenosis of the proximal intradural vertebral arteries due to atherosclerosis. 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 There is distal reconstitution of the hypoplastic vertebral artery. -Otherwise no high-grade stenosis, dissection or aneurysm involving the carotid or right vertebral arteries I personally communicated the findings via telephone with Cruz Curry  at 11:05 a.m. on 3/28/2024. Workstation performed: [id number]     POCT spirometry    Impression: Difficult to interpret but FEV1 was only 12% likely severe obstructive lung defect      I have personally reviewed pertinent reports.   and I have personally reviewed pertinent films in PACS

## 2024-04-19 ENCOUNTER — TELEPHONE (OUTPATIENT)
Dept: BARIATRICS | Facility: CLINIC | Age: 64
End: 2024-04-19

## 2024-04-19 ENCOUNTER — PATIENT OUTREACH (OUTPATIENT)
Dept: FAMILY MEDICINE CLINIC | Facility: HOSPITAL | Age: 64
End: 2024-04-19

## 2024-04-19 NOTE — TELEPHONE ENCOUNTER
ANA MARIAM to call the office to schedule an appt with Dr Brad Mauro for surgical options. Left office info to call back [phone]        ----- Message from JACLYN Elizabeth sent at 4/18/2024  2:21 PM EDT -----  Regarding: Schedule pt  Please call pt to set up an appt to discuss surgical options for GERD with Dr. Brad Plaza   ----- Message -----  From: Lopez Montano DO  Sent: 4/18/2024  12:07 PM EDT  To: JACLYN Elizabeth

## 2024-04-19 NOTE — PROGRESS NOTES
Outpatient Care Management Note:    Voice mail message left for Aristeo, with my contact information, requesting a call back.     Call received from Aristeo.  CM reviewed that [facility] tried to call him this morning. He will call them back. Aristeo states that he has gained 60 lb in the past 6 months. He is hoping he can get on medication for weight loss.    Aristeo states that he received his dexcom and notes that his blood sugars have been averaging between 200-275. Today it has been between 175-180.  His ZuB8B=1.6 .  We talked about his diet and avoiding sugary drinks and snacks.  Aristeo states that he does not eat much.  He drinks Vitamin Water. He has been checking his sugars after eating to see if they affect his sugars. CM reviewed that blood sugars typically elevate for 2 hours after eating.     Overall, Aristeo states that he does not feel well.  He states that he is in constant pain which makes him miserable.  His pain goes from his low back down. He states he also has neuropathy of his feet.  He is on a morphine pump and has a spinal cord stimulator. He sees [facility] and has a follow up in May.     Aristeo also states that he has sleep apnea. He received a mask for a cpap machine but never received the machine. CM can see that the machine was ordered in 3/2024.  CM will forward to pulmonary.     Aristeo has my contact information and will call with any questions.

## 2024-04-22 ENCOUNTER — PATIENT OUTREACH (OUTPATIENT)
Dept: FAMILY MEDICINE CLINIC | Facility: HOSPITAL | Age: 64
End: 2024-04-22

## 2024-04-22 NOTE — PROGRESS NOTES
Outpatient Care Management Note:    Inbasket message received from PCP: I received your message about his blood sugars. With A1C being 6.6 I don't want to make any changes to his medication at this time. I will make sure he has blood work ordered before his appointment in July.

## 2024-04-25 ENCOUNTER — TELEPHONE (OUTPATIENT)
Dept: RADIOLOGY | Facility: HOSPITAL | Age: 64
End: 2024-04-25

## 2024-04-25 NOTE — PRE-PROCEDURE INSTRUCTIONS
Pre-procedure Instructions for Interventional Radiology  [address] [address]  INTERVENTIONAL RADIOLOGY [phone]    You are scheduled for a/an Cerebral Angiogram.    On Friday 5/3/24.    Your tentative arrival time is 0830.  [facility] will notify you the day before your procedure with the exact arrival time and the location to arrive.    To prepare for your procedure:  Please arrange for someone to drive you home after the procedure and stay with you until the next morning if you are instructed to do so.  This is typically for patients receiving some type of sedative or anesthetic for the procedure.  DO NOT EAT OR DRINK ANYTHING after midnight on the evening before your procedure including candy & gum.  ONLY SIPS OF WATER with your medications are allowed on the morning of your procedure.  TAKE ALL OF YOUR REGULAR MEDICATIONS THE MORNING OF YOUR PROCEDURE with sips of water!  We may call you to stop some of your blood sugar, blood pressure and blood thinning medications depending on the procedure.  Please take all of these medications unless we instruct you to stop them.  If you have an allergy to x-ray dye, please contact Interventional Radiology for an x-ray dye preparation which usually consists of an oral steroid and Benadryl.    The day of your procedure:  Bring a list of the medications you take at home.  Bring medications you take for breathing problems (such as inhalers), medications for chest pain, or both.  Bring a case for your glasses or contacts.  Bring your insurance card and a form of photo ID.  Please leave all valuables such as credit cards and jewelry at home.  Report to the admitting office to the left of the registration desk in the main lobby at the [facility], Entrance B.  You will then be directed to the Short Stay Center.  While your procedure is being performed, your family may wait in the Radiology Waiting Room on the 1st floor in  Radiology.  if they need to leave, they may provide a number to be called following the procedure.   Be prepared to stay overnight just in case. Sometimes procedures will indicate the need for further observation or treatment.   If you are scheduled for a follow-up visit with the Interventional Radiologist after your procedure, you will be called with a date and time.    Special Instructions (Medications to stop taking before your procedure etc.):  Hold Jardiance and Lasix AM 5/3/24.

## 2024-04-26 NOTE — PROGRESS NOTES
I was able to see the previous CPAP order was not fulfilled so I went ahead a resubmitted the order via parachute for a adapt. I made sure to include that patient wanted nasal pillows instead of a full face mask and also changed the delivery address to [address] [place] [address] at the patients request.

## 2024-05-03 ENCOUNTER — ANESTHESIA EVENT (OUTPATIENT)
Dept: RADIOLOGY | Facility: HOSPITAL | Age: 64
End: 2024-05-03

## 2024-05-03 ENCOUNTER — ANESTHESIA (OUTPATIENT)
Dept: RADIOLOGY | Facility: HOSPITAL | Age: 64
End: 2024-05-03

## 2024-05-03 ENCOUNTER — HOSPITAL ENCOUNTER (OUTPATIENT)
Dept: RADIOLOGY | Facility: HOSPITAL | Age: 64
Discharge: HOME | End: 2024-05-03
Attending: NEUROLOGICAL SURGERY
Payer: COMMERCIAL

## 2024-05-03 VITALS
DIASTOLIC BLOOD PRESSURE: 73 MMHG | HEIGHT: 73 IN | RESPIRATION RATE: 16 BRPM | SYSTOLIC BLOOD PRESSURE: 135 MMHG | BODY MASS INDEX: 40.02 KG/M2 | HEART RATE: 66 BPM | OXYGEN SATURATION: 93 % | TEMPERATURE: 97.4 F | WEIGHT: 302 LBS

## 2024-05-03 DIAGNOSIS — I67.1 CEREBRAL ANEURYSM: ICD-10-CM

## 2024-05-03 PROCEDURE — C1769 GUIDE WIRE: HCPCS

## 2024-05-03 PROCEDURE — 36224 PLACE CATH CAROTD ART: CPT

## 2024-05-03 PROCEDURE — C1887 CATHETER, GUIDING: HCPCS

## 2024-05-03 PROCEDURE — C1894 INTRO/SHEATH, NON-LASER: HCPCS

## 2024-05-03 PROCEDURE — C1760 CLOSURE DEV, VASC: HCPCS

## 2024-05-03 PROCEDURE — 76937 US GUIDE VASCULAR ACCESS: CPT

## 2024-05-03 PROCEDURE — 36226 PLACE CATH VERTEBRAL ART: CPT

## 2024-05-03 RX ORDER — LIDOCAINE HYDROCHLORIDE 10 MG/ML
INJECTION, SOLUTION EPIDURAL; INFILTRATION; INTRACAUDAL; PERINEURAL AS NEEDED
Status: DISCONTINUED | OUTPATIENT
Start: 2024-05-03 | End: 2024-05-03

## 2024-05-03 RX ORDER — LIDOCAINE HYDROCHLORIDE 10 MG/ML
INJECTION, SOLUTION EPIDURAL; INFILTRATION; INTRACAUDAL; PERINEURAL AS NEEDED
Status: COMPLETED | OUTPATIENT
Start: 2024-05-03 | End: 2024-05-03

## 2024-05-03 RX ORDER — OXYCODONE HYDROCHLORIDE AND ACETAMINOPHEN 5; 325 MG/1; MG/1
1 TABLET ORAL EVERY 6 HOURS PRN
Status: DISCONTINUED | OUTPATIENT
Start: 2024-05-03 | End: 2024-05-04 | Stop reason: HOSPADM

## 2024-05-03 RX ORDER — SODIUM CHLORIDE 9 MG/ML
75 INJECTION, SOLUTION INTRAVENOUS CONTINUOUS
Status: DISCONTINUED | OUTPATIENT
Start: 2024-05-03 | End: 2024-05-04 | Stop reason: HOSPADM

## 2024-05-03 RX ORDER — FENTANYL CITRATE 50 UG/ML
INJECTION, SOLUTION INTRAMUSCULAR; INTRAVENOUS AS NEEDED
Status: DISCONTINUED | OUTPATIENT
Start: 2024-05-03 | End: 2024-05-03

## 2024-05-03 RX ORDER — IODIXANOL 320 MG/ML
300 INJECTION, SOLUTION INTRAVASCULAR
Status: COMPLETED | OUTPATIENT
Start: 2024-05-03 | End: 2024-05-03

## 2024-05-03 RX ORDER — PROPOFOL 10 MG/ML
INJECTION, EMULSION INTRAVENOUS AS NEEDED
Status: DISCONTINUED | OUTPATIENT
Start: 2024-05-03 | End: 2024-05-03

## 2024-05-03 RX ADMIN — FENTANYL CITRATE 50 MCG: 50 INJECTION INTRAMUSCULAR; INTRAVENOUS at 13:24

## 2024-05-03 RX ADMIN — LIDOCAINE HYDROCHLORIDE 50 MG: 10 INJECTION, SOLUTION EPIDURAL; INFILTRATION; INTRACAUDAL; PERINEURAL at 12:39

## 2024-05-03 RX ADMIN — IODIXANOL 125 ML: 320 INJECTION, SOLUTION INTRAVASCULAR at 13:45

## 2024-05-03 RX ADMIN — SODIUM CHLORIDE 75 ML/HR: 0.9 INJECTION, SOLUTION INTRAVENOUS at 08:33

## 2024-05-03 RX ADMIN — PROPOFOL 70 MG: 10 INJECTION, EMULSION INTRAVENOUS at 12:39

## 2024-05-03 RX ADMIN — PROPOFOL 50 MCG/KG/MIN: 10 INJECTION, EMULSION INTRAVENOUS at 12:40

## 2024-05-03 RX ADMIN — LIDOCAINE HYDROCHLORIDE 10 ML: 10 INJECTION, SOLUTION EPIDURAL; INFILTRATION; INTRACAUDAL; PERINEURAL at 12:47

## 2024-05-03 RX ADMIN — FENTANYL CITRATE 50 MCG: 50 INJECTION INTRAMUSCULAR; INTRAVENOUS at 13:21

## 2024-05-03 NOTE — SEDATION DOCUMENTATION
Cerebral angiogram performed by Dr. Avendano. Anesthesia present during the entire procedure. Right groin access site closed with 5Fr MYNX. Patient tolerated procedure well. Will return to [facility] with bedrest for two hours. Start time for bed rest is 1330.

## 2024-05-03 NOTE — ANESTHESIA PREPROCEDURE EVALUATION
Procedure:  IR CEREBRAL ANGIOGRAPHY    Relevant Problems   CARDIO   (+) Chest pain   (+) Chronic diastolic congestive heart failure (HCC)   (+) Chronic migraine without aura   (+) Chronic migraine without aura without status migrainosus, not intractable   (+) Coronary artery disease of native artery of native heart with stable angina pectoris (HCC)   (+) Hyperlipidemia      ENDO   (+) Type 2 diabetes mellitus with diabetic neuropathy, without long-term current use of insulin (HCC)      GI/HEPATIC   (+) Esophageal dysphagia   (+) GERD (gastroesophageal reflux disease)   (+) Hiatal hernia      /RENAL   (+) Stage 2 chronic kidney disease      MUSCULOSKELETAL   (+) Hiatal hernia   (+) Primary osteoarthritis of both knees      NEURO/PSYCH   (+) Chronic migraine without aura   (+) Chronic migraine without aura without status migrainosus, not intractable   (+) Chronic pain disorder   (+) Major depressive disorder, recurrent, moderate (HCC)   (+) Opioid dependence, continuous (HCC)   (+) Stroke (HCC)      PULMONARY   (+) COPD (chronic obstructive pulmonary disease) (HCC)   (+) Chronic obstructive pulmonary disease, unspecified COPD type (HCC)   (+) ARLEEN (obstructive sleep apnea)   (+) Obstructive sleep apnea syndrome   (+) Sleep apnea        Physical Exam    Airway    Mallampati score: II         Dental   No notable dental hx     Cardiovascular      Pulmonary      Other Findings        Anesthesia Plan  ASA Score- 3     Anesthesia Type- IV sedation with anesthesia with ASA Monitors.         Additional Monitors:     Airway Plan:     Comment: I, Dr. Grover, the attending physician, have personally seen and evaluated the patient prior to anesthetic care.  I have reviewed the pre-anesthetic record, and other medical records if appropriate to the anesthetic care.  If a CRNA is involved in the case, I have reviewed the CRNA assessment, if present, and agree.  The patient is in a suitable condition to proceed with my formulated  anesthetic plan.  .       Plan Factors-    Chart reviewed.                      Induction- intravenous.    Postoperative Plan-     Informed Consent- Anesthetic plan and risks discussed with patient.  I personally reviewed this patient with the CRNA. Discussed and agreed on the Anesthesia Plan with the CRNA..

## 2024-05-03 NOTE — H&P
"Patient seen and examined independently.  Clinic note from 4/15/24 remains current.  Patient is not on any new medications and has not had any new medical problems.  Patient remains on dapt.  /77 (BP Location: Left arm)   Pulse 73   Temp 98.7 °F (37.1 °C) (Oral)   Resp 16   Ht 6' 1\" (1.854 m)   Wt (!) 137 kg (302 lb)   SpO2 95%   BMI 39.84 kg/m²  On exam, patient is neurologically intact.  Heart rate is regular.  Breath sounds are clear.  Plan to proceed with diagnostic cerebral arteriogram to better delineate aneurysm.    "

## 2024-05-03 NOTE — DISCHARGE INSTRUCTIONS
Today, you underwent a diagnostic cerebral angiogram under the care of Dr. Avendano for evaluation of a cerebral aneurysm.   ?  The following instructions will help you care for yourself, or be cared for upon your return home today. These are guidelines for your care right after your surgery only.   ?  Notify Your Doctor or Nurse if you have any of the following:  ?  SYMPTOMS OF WOUND INFECTION--   Increased pain in or around the incision   Swelling around the incision  Any drainage from the incision  Incision separates or opens up  Warmth in the tissues around the incision  Redness or tenderness on the skin near the incision   Fever (temperature greater than 101 degrees F)   ?  NEUROLOGICAL CHANGES--  Change in alertness  Increased sleepiness   Nausea and vomiting   New onset of numbness or weakness in arms or legs   New problems with your bowels or bladder  New or worse problems with balance or walking  Seizures, new or worsening  ?  UNRELIEVED HEADACHE PAIN--  New or increased pain unrelieved with pain medications   Pain associated with nausea and vomiting   Pain associated with other symptoms  ?  QUESTIONS OR PROBLEMS--  Any questions or problems that you are unsure about  Wound Care:  Keep Incision Clean and Dry   You may shower daily, but do not soak incision. Pat dry after showering.   No tub baths, soaking, swimming for 1 week after angiogram.   You do not need to cover the incision. Mild to moderate bruising and tenderness to the site is expected and may last up to 1-2 weeks after your procedure.   ?  A closure device was placed at the catheter insertion site. This is MRI compatible. Remove the dressing 24 hours after your procedure.   If your groin site is bleeding, apply firm pressure for 10 minutes. Reinforce dressing rather than removing and checking frequently. If continues to bleed through the dressing after 1 hour, contact your neurosurgeon's office.   Anticipatory Education:  ?  PAIN MED W/  Acetaminophen (Tylenol)  --IF a prescription for pain medicine has been sent home with you:  --Narcotic pain medication may cause constipation. Be sure to take stool softeners or laxatives while you are on narcotic pain medication.   --Do not drive after taking prescription pain medicine.   ?  If this medicine is too strong, or no longer necessary, or we did NOT recommend/prescribe oral narcotics, you may take:   - Tylenol Extra-strength/Acetaminophen, 2 tablets every 4-6 hours as needed for mild pain. DO NOT TAKE MORE THAN 4000MG PER DAY from combined sources. NOTE: Remember to eat when taking pain medicines in order to avoid nausea. Watch for constipation. Eat plenty of fruits, vegetables, juices, and drink 6-8 glasses of water each day.   Constipation: Stay active and drink at least 6-8 cups of fluid each day to prevent constipation. If you need a laxative or stool softener follow the package directions or consult with your local pharmacists if you have questions.  ?  After anesthesia, rest for 24 hours. Do not drive, drink alcohol beverages or make any important decisions during this time. General anesthesia may cause sore throat, jaw discomfort or muscle aches. These symptoms can last for one or two days.  Activity: Please follow these instructions:  Advance your activity as you can tolerate. You may do light house work; nothing strenuous   You may walk all you want. You may go up and down the steps. Use the railing for support  Do not do excessive bending, straining or heavy lifting for 48 hours after your procedure  Do not drive or return to work until you are instructed   It is normal for your energy level and sleep patterns to change after surgery.   Get extra sleep at night and take naps during the day to help you feel less tired.   Take rest periods during the day.   Complete recovery may take several weeks.  ?  You may resume driving after 24-48 hours recovery.   You may return to work after 48 hours of  recovery.   ?  Diet:  Your doctor has recommended that you follow these diet instructions at home. Refer to the patient education materials you received during your hospital stay. If you would like more nutrition counseling, ask your doctor about making an appointment with an outpatient dietitian.  Resume your home diet  ?  Medications:  Please resume your home medications as instructed.   ?  Home Supplies and Equipment:  none  Additional Contacts:  ?  CONTACTS FOR NEUROSURGERY: You may call your neurosurgeon’s office if you have questions between 8:30 am and 4:30 pm. You may request to speak to the nurse practitioner who is available Monday through Friday.   ?  For off hours or the weekend you may call your neurosurgeon's office to leave a message.

## 2024-05-03 NOTE — ANESTHESIA POSTPROCEDURE EVALUATION
Post-Op Assessment Note    CV Status:  Stable    Pain management: adequate       Mental Status:  Alert and awake   Hydration Status:  Euvolemic   PONV Controlled:  Controlled   Airway Patency:  Patent     Post Op Vitals Reviewed: Yes    No anethesia notable event occurred.    Staff: CRNA               BP   190/83   Temp      Pulse  60   Resp   14   SpO2   98

## 2024-05-03 NOTE — OP NOTE
OPERATIVE REPORT  PATIENT NAME: Aristeo Hall     :  1960  MRN: [id number]   Pt Location: Interventional radiology    SURGERY DATE: 24     Preop Diagnosis:  1. Prior aneurysm treatment at [facility]2004 for ACoA aneurysm    Postop Diagnosis  1. 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 Post-processed images were reviewed at a separate work station.    Right Vertebral Artery Arteriogram  Limited femoral angiorgram    Surgeon:   Сергей Avendano MD    Specimen(s):  None    Estimated Blood Loss:   None    Drains:  None    Anesthesia Type:   Monitored Anesthesia Care     Complications:  None    Operative Indications:  Aristeo Hall  is a very pleasant 63 y.o. male who had a previously treated anterior communicating artery aneurysm with concern for recurrence.  After discussing the risks and benefits of a diagnostic cerebral arteriogram including bleeding, stroke, groin hematoma, and death the patient elected to proceed.     Procedure Details:  After obtaining written informed consent, the patient was brought into the operating room and moved to the OR table in supine fashion. The right femoral artery was prepped and draped in the usual sterile fashion. Monitored anesthesia care was induced.  A surgical time-out was performed.  10 cc of 1% lidocaine was infiltrated along the right femoral region.  Using the real-time guidance of ultrasound a micropuncture kit was used to obtain access into the right femoral artery.  Only a single puncture was utilized.  A 5 [place] citizen introducer sheath was then placed. Next a 5 [place] citizen angled glide catheter was advanced and reshaped.    The right common carotid artery  was then catheterized. AP lateral and oblique images of the right cervical carotid were obtained.     The catheter was then advanced and the right internal carotid artery was then catheterized. AP lateral and magnified oblique images of the right intracranial carotid circulation were obtained.     Right vertebral artery was catheterized.  Trans facial and lateral and oblique images of the right vertebral artery circulation were then obtained.    The catheter was then withdrawn into the aortic arch and advanced into the left common carotid artery. AP lateral and oblique images of the left cervical carotid were obtained.     The catheter was then advanced and the left internal carotid artery was then catheterized. AP lateral and magnified oblique images of the left intracranial carotid circulation were obtained.     A 3D rotational arteriogram of the left internal carotid artery was then performed with postprocessing which is reviewed at a separate workstation.    The catheter was then withdrawn from the body.  A limited femoral arteriogram was performed. A Mynx device was used for hemostasis.  There was appropriate hemostasis.   The patient was then awoken from monitored anesthesia care and found to be in baseline neurologic condition. All sponge and needle counts were correct.        INTERPRETATION OF ANGIOGRAPHIC FINDINGS:   1.  The Right vertebral intracranial circulation reveals retrograde reflux down the contralateral vertebral artery. Both PICAs are well visualized. Antegrade flow is present intoall the posterior circulation branches. There are no AVMs or aneurysms. The capillary and venous phases are unremarkable.     2. The Right common carotid circulation reveals antegrade flow into the internal and external carotid arteries. There is no hemodynamically significant carotid stenosis as defined by NASCET criteria.      3. The Right internal carotid artery circulation reveals antegrade flow into the middle  cerebral and anterior cerebral arteries.  The A1 is diminutive with a rapid washout from the contralateral circulation.  There is no evidence of aneurysm, AVM, or vascular malformation. The capillary and venous phases are unremarkable.     4. The Left common carotid circulation reveals antegrade flow into the internal and external carotid arteries. There is no hemodynamically significant carotid stenosis as defined by NASCET criteria.      5. The Left internal carotid artery circulation reveals antegrade flow into the middle cerebral and anterior cerebral arteries. There is a patent anterior communicating artery.  The coil mass of the anterior communicating artery is clearly visualized.  There is a broad necked to the aneurysm with some filling along the aneurysm neck.  This is more consistent with a Alberto 2 coil embolization.  There is no filling into the body of the aneurysm.  Neck however is broad.The capillary and venous phases are unremarkable.     6.  3D rotational arteriogram of the left internal carotid artery better demonstrates the coil mass as well as associated recurrence/coil compaction along the aneurysm neck.  The impaction measures 5 mm at its maximum diameter however is about 3 to 4 mm in size along the majority.  It follows the coil mass without any filling into the aneurysm dome.    Impression:  3 to 5 mm recurrence of a previously treated left P-comm aneurysm at the neck.  No filling of the dome.    Patient Disposition:  APU    SIGNATURE: Сергей Avendano MD  DATE: 05/03/24   TIME: 2:19 PM

## 2024-05-06 ENCOUNTER — TELEPHONE (OUTPATIENT)
Dept: NEUROSURGERY | Facility: CLINIC | Age: 64
End: 2024-05-06

## 2024-05-06 NOTE — TELEPHONE ENCOUNTER
Completed post angio callback to Aristeo Hall at primary contact number. The patient denies any pain, swelling, drainage, fevers at the puncture site. Is not currently experiencing any numbness, tingling, or weakness in his leg. Reports no headaches at this time. Advised that it is normal to experience fatigue and mild headaches for a few days after the procedure. Advised that tylenol should provide adequate relief. Explained that he should contact the office if he experiences any pain, swelling, or drainage from the site, and report to the ER or call 911 if he experiences WHOL, visual disturbance, of confusion/disorientation, slurred speech, ambulatory dysfunction. Reminded of post procedure follow-up scheduled on 5/22/2024. Patient appreciative of call.

## 2024-05-14 ENCOUNTER — PATIENT OUTREACH (OUTPATIENT)
Dept: FAMILY MEDICINE CLINIC | Facility: HOSPITAL | Age: 64
End: 2024-05-14

## 2024-05-14 NOTE — PROGRESS NOTES
Outpatient Care Management Note:    RIGO called Aristeo. He has not received his CPAP yet. He states that he is supposed to pick it up on 5/30.  He did scheduled with bariatrics for 5/20.    Aristeo states that his blood sugars are averaging between 160-220.  Currently it is 156.  He will monitor his sugars closely and will call Lisbeth Gold if they are trending higher.     Aristeo states that he had his angiogram completed. He is scheduled to follow up with neurosurgery on 5/22. He states that he needs his aneurysm repaired and will be discussing it at his upcoming appt.    Aristeo then stated that he has been having chest pain at night. It occurs if he lays flat and resolves if he sits up.  He states that he has been going through a lot of nitroglycerin. Aristeo denies any chest pain symptoms now.  RIGO reviewed that he should call 911 and go to the ER if he has to take 3 nitroglycerin tablets. I instructed him to call cardiology now and review his concerns. He agreed to call.

## 2024-05-15 ENCOUNTER — PATIENT OUTREACH (OUTPATIENT)
Dept: FAMILY MEDICINE CLINIC | Facility: HOSPITAL | Age: 64
End: 2024-05-15

## 2024-05-16 ENCOUNTER — OFFICE VISIT (OUTPATIENT)
Dept: UROLOGY | Facility: MEDICAL CENTER | Age: 64
End: 2024-05-16
Payer: COMMERCIAL

## 2024-05-16 VITALS
SYSTOLIC BLOOD PRESSURE: 136 MMHG | WEIGHT: 308 LBS | HEIGHT: 73 IN | DIASTOLIC BLOOD PRESSURE: 76 MMHG | HEART RATE: 86 BPM | BODY MASS INDEX: 40.82 KG/M2 | OXYGEN SATURATION: 96 %

## 2024-05-16 DIAGNOSIS — R39.16 BENIGN PROSTATIC HYPERPLASIA (BPH) WITH STRAINING ON URINATION: ICD-10-CM

## 2024-05-16 DIAGNOSIS — N40.1 BENIGN PROSTATIC HYPERPLASIA (BPH) WITH STRAINING ON URINATION: ICD-10-CM

## 2024-05-16 DIAGNOSIS — R33.9 URINARY RETENTION WITH INCOMPLETE BLADDER EMPTYING: Primary | ICD-10-CM

## 2024-05-16 LAB
POST-VOID RESIDUAL VOLUME, ML POC: 93 ML
SL AMB  POCT GLUCOSE, UA: ABNORMAL
SL AMB LEUKOCYTE ESTERASE,UA: ABNORMAL
SL AMB POCT BILIRUBIN,UA: ABNORMAL
SL AMB POCT BLOOD,UA: ABNORMAL
SL AMB POCT CLARITY,UA: CLEAR
SL AMB POCT COLOR,UA: YELLOW
SL AMB POCT KETONES,UA: ABNORMAL
SL AMB POCT NITRITE,UA: ABNORMAL
SL AMB POCT PH,UA: 5.5
SL AMB POCT SPECIFIC GRAVITY,UA: 1.02
SL AMB POCT URINE PROTEIN: ABNORMAL
SL AMB POCT UROBILINOGEN: 1

## 2024-05-16 PROCEDURE — 81003 URINALYSIS AUTO W/O SCOPE: CPT | Performed by: UROLOGY

## 2024-05-16 PROCEDURE — 51798 US URINE CAPACITY MEASURE: CPT | Performed by: UROLOGY

## 2024-05-16 PROCEDURE — 99213 OFFICE O/P EST LOW 20 MIN: CPT | Performed by: UROLOGY

## 2024-05-16 NOTE — PROGRESS NOTES
Patient ID: Aristeo Hall is a 63 y.o. male Date of Birth 1960       No chief complaint on file.            Diagnosis:  1. Type 2 diabetes mellitus with diabetic neuropathy, with long-term current use of insulin ([facility])  2. Onychomycosis    Patient presents for at-risk diabetic foot care.  Patient has no acute concerns today.  Patient has significant lower extremity risk due to neuropathy, parasthesia, edema, and trophic skin changes to the lower extremity. Most recent HBA1c was 6.6 on 4/5/24, FBS this am 136,  Last visit with PCP 4/5/24.  Patient was given a prescription for 1 pair diabetic shoes and 3 pair custom molded inserts on 12/29/23 he states he has not gotten them yet as he has been in and out of the hospital,    On exam patient has thickened, hypertrophic, discolored, brittle toenails with subungual debris and tenderness x10   Callus: None  Patient does not have BL lower extremity edema  Patient's skin is atrophic, thickened nails, and decreased pedal hair  Patient has decreased pinprick and vibratory sensation to his feet and parasthesia  Patient does not have any  pedal ulcerations.  DP/PT are 2 out of 4 bilaterally    Today's treatment includes:  Debridement of toenails. Using nail nipper, chavo, and curette, nails were sharply debrided, reduced in thickness and length. Devitalized nail tissue and fungal debris excised and removed. Patient tolerated well.      Continue Ammonium lactate 12% cream  to top and bottom of feet, avoid in between the toes.     Patient given a prescription for 1 pair diabetic shoes and 3 pair custom molded inserts at last visit - he was encouraged to call and get measured for new ones.    Discussed proper shoe gear, daily inspections of feet, and general foot health with patient. Patient has Q9 findings and is recommended for at risk foot care every 9-10 weeks.    Patients most recent complete clinical foot exam was on: 9/15/2023    The following portions of the  "patient's history were reviewed and updated as appropriate: allergies, current medications, past family history, past medical history, past social history, past surgical history, and problem list.      There were no vitals taken for this visit.      No pertinent results found.      Mary Sanchez DPM, DPM, ANAID    Portions of the record may have been created with voice recognition software. Occasional wrong word or \"sound a like\" substitutions may have occurred due to the inherent limitations of voice recognition software. Read the chart carefully and recognize, using context, where substitutions have occurred.  "

## 2024-05-16 NOTE — PROGRESS NOTES
"   HISTORY:    1.  Follow-up for episode of poor bladder emptying in November 2023.    At that time cystoscopy showed no significant prostate enlargement, not really obstructing    I reviewed CT scans for several years at that time, bladder was always distended.    He is emptying better today, PVR only 93 mL    He says he voids well as long as he takes his Lasix twice per day         ASSESSMENT / PLAN:    Doing well with PVR much improved    Check PSA at next blood draw    Follow-up 1 year    The following portions of the patient's history were reviewed and updated as appropriate: allergies, current medications, past family history, past medical history, past social history, past surgical history, and problem list.    Review of Systems      Objective:     Physical Exam  Constitutional:       Appearance: Normal appearance.   Neurological:      Mental Status: He is alert.           No results found for: \"PSA\"]  BUN   Date Value Ref Range Status   04/05/2024 11 5 - 25 mg/dL Final   07/14/2022 13 7 - 28 mg/dL Final     Creatinine   Date Value Ref Range Status   04/05/2024 1.16 0.60 - 1.30 mg/dL Final     Comment:     Standardized to IDMS reference method   07/14/2022 0.84 0.53 - 1.30 mg/dL Final     No components found for: \"CBC\"      Patient Active Problem List   Diagnosis    Chronic diastolic congestive heart failure (HCC)    Coronary artery disease of native artery of native heart with stable angina pectoris (HCC)    Morbid obesity (HCC)    History of CVA (cerebrovascular accident)    Stroke (HCC)    Stented coronary artery    Obstructive sleep apnea syndrome    CPAP (continuous positive airway pressure) dependence    Cerebral aneurysm    Chronic pain disorder    Constipated    Sleep apnea    Chest pain    Stage 2 chronic kidney disease    GERD (gastroesophageal reflux disease)    Opioid dependence, continuous (HCC)    Esophageal dysphagia    Chronic migraine without aura without status migrainosus, not intractable "    Hyperlipidemia    Vitamin D deficiency    Symptomatic bradycardia    Bilateral foot pain    Orthostatic hypotension    Primary osteoarthritis of both knees    Mild cognitive impairment    COPD (chronic obstructive pulmonary disease) ([facility])    Major depressive disorder, recurrent, moderate (HCC)    Radiculopathy, lumbosacral region    Neuropathy    Presence of intrathecal pump    Skin inflammation    Status post insertion of spinal cord stimulator    History of gastric sleeve procedure    Myocarditis ([facility])    Arteriosclerosis of artery of extremity ([facility])    Hiatal hernia    Type 2 diabetes mellitus with diabetic neuropathy, without long-term current use of insulin ([facility])    ARLEEN (obstructive sleep apnea)    Chronic obstructive pulmonary disease, unspecified COPD type ([facility])    Stroke-like symptoms    Abnormal MRI of head    Blurring of visual image    Chronic migraine without aura        Diagnoses and all orders for this visit:    Urinary retention with incomplete bladder emptying  -     POCT urine dip auto non-scope  -     POCT Measure PVR    Benign prostatic hyperplasia (BPH) with straining on urination  -     POCT urine dip auto non-scope  -     POCT Measure PVR  -     PSA Total, Diagnostic; Future           Patient ID: Aristeo Hall is a 63 y.o. male.      Current Outpatient Medications:     albuterol (2.5 mg/3 mL) 0.083 % nebulizer solution, Take 3 mL (2.5 mg total) by nebulization every 6 (six) hours as needed for wheezing or shortness of breath, Disp: 720 mL, Rfl: 0    amLODIPine (NORVASC) 5 mg tablet, TAKE 1 TABLET (5 MG TOTAL) BY MOUTH DAILY., Disp: 90 tablet, Rfl: 0    ammonium lactate (LAC-HYDRIN) 12 % cream, Apply topically as needed for dry skin, Disp: 385 g, Rfl: 2    aspirin 81 mg chewable tablet, Chew 1 tablet (81 mg total) daily, Disp: 90 tablet, Rfl: 1    atorvastatin (LIPITOR) 80 mg tablet, TAKE 1 TABLET (80 MG TOTAL) BY MOUTH DAILY AFTER DINNER, Disp: 90 tablet, Rfl: 0    baclofen 10 mg  tablet, Take 10 mg by mouth 3 (three) times a day, Disp: , Rfl:     busPIRone (BUSPAR) 5 mg tablet, Take 5 mg by mouth 2 (two) times a day, Disp: , Rfl:     CALCIUM PO, Take 1 tablet by mouth daily, Disp: , Rfl:     carvedilol (COREG) 12.5 mg tablet, TAKE 1 TABLET BY MOUTH TWICE A DAY WITH FOOD, Disp: 60 tablet, Rfl: 3    clopidogrel (PLAVIX) 75 mg tablet, TAKE 1 TABLET BY MOUTH EVERY DAY, Disp: 90 tablet, Rfl: 0    Continuous Blood Gluc  (Dexcom G6 ) CANDACE, 1 DEXCOM G6  FOR CONTINUOUS GLUCOSE MONITORING, Disp: 1 each, Rfl: 0    Continuous Blood Gluc Sensor (Dexcom G6 Sensor) MISC, 3 PACK SENSOR FOR CONTINUOUS GLUCOSE MONITORING, Disp: 9 each, Rfl: 3    Continuous Blood Gluc Transmit (Dexcom G6 Transmitter) MISC, 1 TRANSMITTED EVERY 3 MONTHS FOR CONTINUOUS GLUCOSE MONITORING, Disp: 1 each, Rfl: 3    Cyanocobalamin (VITAMIN B-12 PO), Take 1 tablet by mouth daily, Disp: , Rfl:     docusate sodium (COLACE) 100 mg capsule, Take 1 capsule (100 mg total) by mouth 2 (two) times a day, Disp: 180 capsule, Rfl: 3    Empagliflozin (JARDIANCE) 10 MG TABS tablet, Take 1 tablet (10 mg total) by mouth every morning, Disp: 30 tablet, Rfl: 11    ergocalciferol (VITAMIN D2) 50,000 units, Take 1 capsule (50,000 Units total) by mouth 2 (two) times a week, Disp: 24 capsule, Rfl: 0    Fluticasone-Salmeterol (Advair Diskus) 500-50 mcg/dose inhaler, Inhale 1 puff 2 (two) times a day Rinse mouth after use, Disp: 60 blister, Rfl: 3    folic acid (FOLVITE) 1 mg tablet, Take 1 tablet (1 mg total) by mouth daily, Disp: 90 tablet, Rfl: 1    furosemide (LASIX) 40 mg tablet, Take once in AM daily. Take once daily in PM PRN weight gain, edema., Disp: 180 tablet, Rfl: 3    gabapentin (NEURONTIN) 800 mg tablet, Take 800 mg by mouth 4 (four) times a day, Disp: , Rfl:     hydrocortisone 1 % lotion, Apply topically if needed for rash, Disp: 59 mL, Rfl: 1    Klor-Con M20 20 MEQ tablet, TAKE 1 TABLET BY MOUTH EVERY DAY, Disp: 90  tablet, Rfl: 0    lidocaine (LIDODERM) 5 %, Apply 1 patch topically daily back, Disp: , Rfl:     losartan (COZAAR) 50 mg tablet, TAKE 1 TABLET BY MOUTH EVERY DAY, Disp: 90 tablet, Rfl: 1    methocarbamol (ROBAXIN) 750 mg tablet, TAKE 1 TABLET (750 MG TOTAL) BY MOUTH EVERY 6 (SIX) HOURS AS NEEDED FOR MUSCLE SPASMS, Disp: 120 tablet, Rfl: 0    naloxegol oxalate (MOVANTIK) 25 MG tablet, Take 1 tablet (25 mg total) by mouth daily in the early morning, Disp: 30 tablet, Rfl: 5    nitroglycerin (Nitrostat) 0.4 mg SL tablet, Place 1 tablet (0.4 mg total) under the tongue every 5 (five) minutes as needed for chest pain (pt has not  used recently), Disp: 30 tablet, Rfl: 0    nystatin (MYCOSTATIN) cream, Apply 2 g (1 Application total) topically 2 (two) times a day, Disp: 15 g, Rfl: 0    omeprazole (PriLOSEC) 40 MG capsule, Take 1 capsule (40 mg total) by mouth 2 (two) times a day before meals, Disp: 60 capsule, Rfl: 5    ondansetron (ZOFRAN) 4 mg tablet, Take 1 tablet (4 mg total) by mouth every 8 (eight) hours as needed for nausea or vomiting, Disp: 60 tablet, Rfl: 3    predniSONE 10 mg tablet, Take 2 a day for 1 week then 1 a day for 14 days, Disp: 28 tablet, Rfl: 0    ranolazine (RANEXA) 1000 MG SR tablet, TAKE 1 TABLET (1,000 MG TOTAL) BY MOUTH EVERY 12 HOURS, Disp: 60 tablet, Rfl: 3    tamsulosin (FLOMAX) 0.4 mg, TAKE 1 CAPSULE BY MOUTH EVERY DAY WITH DINNER, Disp: 90 capsule, Rfl: 1    traZODone (DESYREL) 100 mg tablet, Take 1 tablet (100 mg total) by mouth daily at bedtime, Disp: 30 tablet, Rfl: 0    vitamin B-12 (VITAMIN B-12) 1,000 mcg tablet, Take 1 tablet (1,000 mcg total) by mouth daily, Disp: 90 tablet, Rfl: 3    Past Medical History:   Diagnosis Date    Acute on chronic diastolic congestive heart failure (HCC)     Altered gait     Alzheimer disease (HCC)     per patients,,early onset     Angina pectoris (HCC)     Anxiety     Arthritis     Brain aneurysm     coils placed    Cardiac disease     Chest pain  01/13/2016    Chronic kidney disease     Chronic pain     back/ right groin and rle- has morphine pump    Constipation     COPD (chronic obstructive pulmonary disease) ([facility])     Coronary artery disease     CPAP (continuous positive airway pressure) dependence     Decubital ulcer     sacral decub-occured 5/2019-sees wound care/debide in OR today 6/6/2019    Dependent on walker for ambulation     w/c for long distance    Depression     Diabetes mellitus ([facility])     insulin dependent    Dizziness     occ    Dysphagia     Enlarged prostate     Esophageal stricture In file    Esophageal varices ([facility])     Fall     Fall at home 05/03/2019    GERD (gastroesophageal reflux disease)     Heart failure ([facility])     Hiatal hernia     Hx of gastric bypass 11/19/2018    Hypercholesterolemia     Hypertension     MI (myocardial infarction) ([facility])     2017- stents x2    Migraine     Morbid obesity ([facility])     gastric bypass sleeve 11/2018-wt loss 125 lb    Neuropathy     Oxygen dependent     Q HS  2LPM with CPAP and prn during day 2-3 LPM     Pressure injury of skin     Pressure injury of skin of sacral region 06/03/2019    Added automatically from request for surgery [id number]    Renal disorder     Shortness of breath     Skin abnormality     sacral wound - covered with pad    Sleep apnea     Stented coronary artery     Stroke ([facility])     vision loss b/l  2005, residual R leg weakness    Type 2 diabetes mellitus with diabetic neuropathy, with long-term current use of insulin ([facility]) 10/18/2019    Type 2 diabetes mellitus with renal complication ([facility])     insulin dependent    Type 2 diabetes mellitus, with long-term current use of insulin ([facility]) 10/11/2017    Transitioned From: Diabetes mellitus type 2, uncontrolled    Urinary frequency     Use of cane as ambulatory aid     Wears dentures     Wears glasses     Wears glasses     Wheelchair dependent        Past Surgical History:   Procedure Laterality Date    BACK SURGERY      BRAIN SURGERY       CARDIAC CATHETERIZATION      with stents    CARDIAC CATHETERIZATION N/A 8/18/2023    Procedure: Cardiac Coronary Angiogram;  Surgeon: Horacio Weiner MD;  Location: [facility];  Service: Cardiology    CEREBRAL ANEURYSM REPAIR      with coils    COLONOSCOPY      ESOPHAGOGASTRODUODENOSCOPY N/A 7/1/2016    Procedure: ESOPHAGOGASTRODUODENOSCOPY (EGD);  Surgeon: Reno Christiansen MD;  Location: [facility] GI [facility];  Service:     GASTRIC BYPASS  11/19/2018    HERNIA REPAIR      HIATAL HERNIA REPAIR      INFUSION PUMP IMPLANTATION Left     morphine    INTRATHECAL PUMP IMPLANTATION Left 7/9/2020    Procedure: REVISION INTRATHECAL PAIN PUMP POCKET, LEFT ABDOMEN;  Surgeon: Homero Cho MD;  Location: [facility];  Service: Neurosurgery    KNEE ARTHROSCOPY Right     KNEE ARTHROSCOPY Right     PERONEAL NERVE DECOMPRESSION Right     UT DEBRIDEMENT OPEN WOUND FIRST 20 SQ CM/< N/A 6/6/2019    Procedure: EXCISIONAL DEBRIDEMENT OF SACRAL DECUBITUS ULCER;  Surgeon: Siddhartha Omer MD;  Location: [facility];  Service: General    UT ESOPHAGOGASTRODUODENOSCOPY TRANSORAL DIAGNOSTIC N/A 2/27/2017    Procedure: ESOPHAGOGASTRODUODENOSCOPY (EGD);  Surgeon: Reno Christiansen MD;  Location: [facility];  Service: Gastroenterology    UT ESOPHAGOGASTRODUODENOSCOPY TRANSORAL DIAGNOSTIC N/A 8/23/2018    Procedure: ESOPHAGOGASTRODUODENOSCOPY (EGD) with biopsy;  Surgeon: Reno Christiansen MD;  Location: [facility];  Service: Gastroenterology    UT IMPLTJ/RPLCMT ITHCL/EDRL DRUG NFS PRGRBL PUMP Left 10/13/2020    Procedure: EXPLORATION OF INTRATHECAL PAIN PUMP SYSTEM INTEGRITY FOR POSSIBLE REPLACEMENT OF CATHETER AND PUMP.;  Surgeon: Homero Cho MD;  Location: [facility];  Service: Neurosurgery    UT IMPLTJ/RPLCMT ITHCL/EDRL DRUG NFS SUBQ RSVR N/A 1/19/2017    Procedure: REMOVAL / EXCHANGE INTRATHECAL PAIN PUMP;  Surgeon: Que Leonard MD;  Location: [facility];  Service: Orthopedics    UT IMPLTJ/RPLCMT ITHCL/EDRL DRUG NFS SUBQ RSVR N/A 5/16/2016    Procedure:  REPLACEMENT AND PROGRAM PUMP ;  Surgeon: Que Leonard MD;  Location: [facility];  Service: Orthopedics    IA PRQ IMPLTJ NSTIM ELECTRODE ARRAY EPIDURAL Right 3/17/2021    Procedure: INSERTION THORACIC DORSAL COLUMN SPINAL CORD STIMULATOR PERCUTANEOUS W IMPLANTABLE PULSE GENERATOR, RIGHT;  Surgeon: Homero Cho MD;  Location: [facility];  Service: Neurosurgery       Social History

## 2024-05-17 ENCOUNTER — APPOINTMENT (OUTPATIENT)
Dept: LAB | Facility: CLINIC | Age: 64
End: 2024-05-17
Payer: COMMERCIAL

## 2024-05-17 ENCOUNTER — OFFICE VISIT (OUTPATIENT)
Dept: PODIATRY | Facility: CLINIC | Age: 64
End: 2024-05-17
Payer: COMMERCIAL

## 2024-05-17 VITALS
BODY MASS INDEX: 40.82 KG/M2 | WEIGHT: 308 LBS | HEIGHT: 73 IN | HEART RATE: 69 BPM | DIASTOLIC BLOOD PRESSURE: 80 MMHG | SYSTOLIC BLOOD PRESSURE: 132 MMHG

## 2024-05-17 DIAGNOSIS — R39.16 BENIGN PROSTATIC HYPERPLASIA (BPH) WITH STRAINING ON URINATION: ICD-10-CM

## 2024-05-17 DIAGNOSIS — N40.1 BENIGN PROSTATIC HYPERPLASIA (BPH) WITH STRAINING ON URINATION: ICD-10-CM

## 2024-05-17 DIAGNOSIS — B35.1 ONYCHOMYCOSIS: ICD-10-CM

## 2024-05-17 DIAGNOSIS — Z79.4 TYPE 2 DIABETES MELLITUS WITH DIABETIC NEUROPATHY, WITH LONG-TERM CURRENT USE OF INSULIN (HCC): Primary | ICD-10-CM

## 2024-05-17 DIAGNOSIS — E11.40 TYPE 2 DIABETES MELLITUS WITH DIABETIC NEUROPATHY, WITH LONG-TERM CURRENT USE OF INSULIN (HCC): Primary | ICD-10-CM

## 2024-05-17 LAB — PSA SERPL-MCNC: 0.4 NG/ML (ref 0–4)

## 2024-05-17 PROCEDURE — 11721 DEBRIDE NAIL 6 OR MORE: CPT | Performed by: PODIATRIST

## 2024-05-17 PROCEDURE — 84153 ASSAY OF PSA TOTAL: CPT

## 2024-05-17 PROCEDURE — 36415 COLL VENOUS BLD VENIPUNCTURE: CPT

## 2024-05-20 ENCOUNTER — CONSULT (OUTPATIENT)
Dept: BARIATRICS | Facility: CLINIC | Age: 64
End: 2024-05-20
Payer: COMMERCIAL

## 2024-05-20 VITALS
HEIGHT: 74 IN | BODY MASS INDEX: 38.89 KG/M2 | OXYGEN SATURATION: 96 % | HEART RATE: 85 BPM | SYSTOLIC BLOOD PRESSURE: 140 MMHG | WEIGHT: 303 LBS | TEMPERATURE: 96.4 F | DIASTOLIC BLOOD PRESSURE: 86 MMHG

## 2024-05-20 DIAGNOSIS — Z48.815 ENCOUNTER FOR SURGICAL AFTERCARE FOLLOWING SURGERY OF DIGESTIVE SYSTEM: ICD-10-CM

## 2024-05-20 DIAGNOSIS — Z98.84 BARIATRIC SURGERY STATUS: Primary | ICD-10-CM

## 2024-05-20 DIAGNOSIS — K44.9 HIATAL HERNIA: ICD-10-CM

## 2024-05-20 DIAGNOSIS — K21.9 GASTROESOPHAGEAL REFLUX DISEASE WITHOUT ESOPHAGITIS: ICD-10-CM

## 2024-05-20 DIAGNOSIS — I25.10 CORONARY ARTERY DISEASE INVOLVING NATIVE CORONARY ARTERY OF NATIVE HEART WITHOUT ANGINA PECTORIS: ICD-10-CM

## 2024-05-20 PROCEDURE — 99213 OFFICE O/P EST LOW 20 MIN: CPT | Performed by: SURGERY

## 2024-05-20 NOTE — PROGRESS NOTES
OFFICE VISIT - BARIATRIC SURGERY  Aristeo Hall 63 y.o. male MRN: [id number]  Unit/Bed#:  Encounter: [id number]      HPI:  Aristeo Hall is a 63 y.o. male status post Vertical Sleeve Gastrectomy with [facility] in November 2019. Presents to the office today to discuss surgical options for concerns of weight regain along with worsening dysphagia, regurgitation, and reflux symptoms.      Subjective     He is interested in surgical interventions to help him with his symptoms and his weight gain. Reports he always had the sensation of food being stuck ever since surgery however thought to be normal and expected after surgery. He notes food doesn't go down quickly unless he drinks a lot to get it down and then he gets regurgitation. Unable to tolerate many types of foods; tries to eat a softer diet. Has been following with GI to evaluate dysphagia and GERD symptoms. Taking omeprazole 40 mg PO BID currently. EGD without esophagitis; esophageal manometry shows distal esophageal spasm.      Denies smoking, ETOH use. Uses daily aspirin. Is also on plavix for hx of CVA.     Had recent EGD and manometry     Patient on chronic narcotics with pain pump    At the time of their surgery the patient was 375lb, and was able to drop down as low as 220lb. Today, their weight is 303lb, with a BMI of 39.43.       Tolerating diet: Yes  Main symptoms: Reflux, regurgitation, dysphasia to liquids and solids  Worse with food: Yes  Nausea: No  Vomiting: Yes  Diarrhea: No  Current treatment: PPI BID  Previous EGD: Yes  Previous Upper GI: Yes   Previous Manometry: Yes  Ambulation is not impaired.     Review of Systems   Constitutional:  Negative for chills and fever.   HENT:  Negative for ear pain and sore throat.    Eyes:  Negative for pain and visual disturbance.   Respiratory:  Negative for cough and shortness of breath.    Cardiovascular:  Negative for chest pain and palpitations.   Gastrointestinal:  Negative for abdominal  pain and vomiting.        Reflux, regurgitation, dysphasia   Genitourinary:  Negative for dysuria and hematuria.   Musculoskeletal:  Negative for arthralgias and back pain.   Skin:  Negative for color change and rash.   Neurological:  Negative for seizures and syncope.   All other systems reviewed and are negative.      Historical Information   Past Medical History:   Diagnosis Date    Acute on chronic diastolic congestive heart failure (HCC)     Altered gait     Alzheimer disease ([facility])     per patients,,early onset     Angina pectoris ([facility])     Anxiety     Arthritis     Brain aneurysm     coils placed    Cardiac disease     Chest pain 01/13/2016    Chronic kidney disease     Chronic pain     back/ right groin and rle- has morphine pump    Constipation     COPD (chronic obstructive pulmonary disease) ([facility])     Coronary artery disease     CPAP (continuous positive airway pressure) dependence     Decubital ulcer     sacral decub-occured 5/2019-sees wound care/debide in OR today 6/6/2019    Dependent on walker for ambulation     w/c for long distance    Depression     Diabetes mellitus ([facility])     insulin dependent    Difficulty walking     Dizziness     occ    Dysphagia     Enlarged prostate     Esophageal stricture In file    Esophageal varices ([facility])     Fall     Fall at home 05/03/2019    GERD (gastroesophageal reflux disease)     Heart failure ([facility])     Hiatal hernia     Hx of gastric bypass 11/19/2018    Hypercholesterolemia     Hypertension     Ingrown toenail     MI (myocardial infarction) ([facility])     2017- stents x2    Migraine     Morbid obesity ([facility])     gastric bypass sleeve 11/2018-wt loss 125 lb    Myocardial infarction ([facility]) 2003    Neuropathy     Oxygen dependent     Q HS  2LPM with CPAP and prn during day 2-3 LPM     Pressure injury of skin     Pressure injury of skin of sacral region 06/03/2019    Added automatically from request for surgery [id number]    Renal disorder     Shortness of breath     Skin  abnormality     sacral wound - covered with pad    Sleep apnea     Stented coronary artery     Stroke ([facility])     vision loss b/l  2005, residual R leg weakness    Type 2 diabetes mellitus with diabetic neuropathy, with long-term current use of insulin ([facility]) 10/18/2019    Type 2 diabetes mellitus with renal complication ([facility])     insulin dependent    Type 2 diabetes mellitus, with long-term current use of insulin ([facility]) 10/11/2017    Transitioned From: Diabetes mellitus type 2, uncontrolled    Urinary frequency     Use of cane as ambulatory aid     Wears dentures     Wears glasses     Wears glasses     Wheelchair dependent      Past Surgical History:   Procedure Laterality Date    BACK SURGERY      BRAIN SURGERY      CARDIAC CATHETERIZATION      with stents    CARDIAC CATHETERIZATION N/A 8/18/2023    Procedure: Cardiac Coronary Angiogram;  Surgeon: Horacio Weiner MD;  Location: [facility];  Service: Cardiology    CEREBRAL ANEURYSM REPAIR      with coils    COLONOSCOPY      ESOPHAGOGASTRODUODENOSCOPY N/A 7/1/2016    Procedure: ESOPHAGOGASTRODUODENOSCOPY (EGD);  Surgeon: Reno Christiansen MD;  Location: [facility];  Service:     GASTRIC BYPASS  11/19/2018    HERNIA REPAIR      HIATAL HERNIA REPAIR      INFUSION PUMP IMPLANTATION Left     morphine    INTRATHECAL PUMP IMPLANTATION Left 7/9/2020    Procedure: REVISION INTRATHECAL PAIN PUMP POCKET, LEFT ABDOMEN;  Surgeon: Homero Cho MD;  Location: [facility];  Service: Neurosurgery    KNEE ARTHROSCOPY Right     KNEE ARTHROSCOPY Right     PERONEAL NERVE DECOMPRESSION Right     VA DEBRIDEMENT OPEN WOUND FIRST 20 SQ CM/< N/A 6/6/2019    Procedure: EXCISIONAL DEBRIDEMENT OF SACRAL DECUBITUS ULCER;  Surgeon: Siddhartha Omer MD;  Location[facility];  Service: General    VA ESOPHAGOGASTRODUODENOSCOPY TRANSORAL DIAGNOSTIC N/A 2/27/2017    Procedure: ESOPHAGOGASTRODUODENOSCOPY (EGD);  Surgeon: Reno Christiansen MD;  Location: [facility];  Service: Gastroenterology    VA  "ESOPHAGOGASTRODUODENOSCOPY TRANSORAL DIAGNOSTIC N/A 8/23/2018    Procedure: ESOPHAGOGASTRODUODENOSCOPY (EGD) with biopsy;  Surgeon: Reno Christiansen MD;  [facility]: [facility];  Service: Gastroenterology    OR IMPLTJ/RPLCMT ITHCL/EDRL DRUG NFS PRGRBL PUMP Left 10/13/2020    Procedure: EXPLORATION OF INTRATHECAL PAIN PUMP SYSTEM INTEGRITY FOR POSSIBLE REPLACEMENT OF CATHETER AND PUMP.;  Surgeon: Homero Cho MD;  Location: [facility];  Service: Neurosurgery    OR IMPLTJ/RPLCMT ITHCL/EDRL DRUG NFS SUBQ RSVR N/A 1/19/2017    Procedure: REMOVAL / EXCHANGE INTRATHECAL PAIN PUMP;  Surgeon: Que Leonard MD;  [facility];  Service: Orthopedics    OR IMPLTJ/RPLCMT ITHCL/EDRL DRUG NFS SUBQ RSVR N/A 5/16/2016    Procedure: REPLACEMENT AND PROGRAM PUMP ;  Surgeon: Que Leonard MD;  [facility];  Service: Orthopedics    OR PRQ IMPLTJ NSTIM ELECTRODE ARRAY EPIDURAL Right 3/17/2021    Procedure: INSERTION THORACIC DORSAL COLUMN SPINAL CORD STIMULATOR PERCUTANEOUS W IMPLANTABLE PULSE GENERATOR, RIGHT;  Surgeon: Homero Cho MD;  [facility];  Service: Neurosurgery     Social History   Social History     Substance and Sexual Activity   Alcohol Use Not Currently     Social History     Substance and Sexual Activity   Drug Use Not Currently    Types: Marijuana, Morphine    Comment: Medical card     Social History     Tobacco Use   Smoking Status Never   Smokeless Tobacco Never       Objective       Current Vitals:   Blood Pressure: 140/86 (05/20/24 1310)  Pulse: 85 (05/20/24 1310)  Temperature: (!) 96.4 °F (35.8 °C) (05/20/24 1310)  Temp Source: Tympanic (05/20/24 1310)  Height: 6' 1.5\" (186.7 cm) (05/20/24 1310)  Weight - Scale: (!) 137 kg (303 lb) (05/20/24 1310)  SpO2: 96 % (05/20/24 1310)    Invasive Devices       Line  Duration             Pump Device Pain pump Left Abdomen -- days                    Physical Exam  Constitutional:       Appearance: Normal appearance. He is obese.   HENT:      Head: " Normocephalic and atraumatic.      Nose: Nose normal.      Mouth/Throat:      Mouth: Mucous membranes are moist.   Eyes:      Extraocular Movements: Extraocular movements intact.      Pupils: Pupils are equal, round, and reactive to light.   Cardiovascular:      Rate and Rhythm: Normal rate and regular rhythm.      Pulses: Normal pulses.      Heart sounds: Normal heart sounds.   Pulmonary:      Effort: Pulmonary effort is normal.      Breath sounds: Normal breath sounds.   Abdominal:      Palpations: Abdomen is soft.      Comments: Well healed scars   Musculoskeletal:      Cervical back: Normal range of motion.   Skin:     General: Skin is warm.   Neurological:      General: No focal deficit present.      Mental Status: He is alert and oriented to person, place, and time.   Psychiatric:         Mood and Affect: Mood normal.         Behavior: Behavior normal.           Pathology, and Other Studies:       Assessment/PLAN:    Aristeo Hall is a 63 y.o. male status post Vertical Sleeve Gastrectomy with [facility] in November 2019. Presents to the office today to discuss surgical options for concerns of weight regain along with worsening dysphagia and reflux symptoms.      Workup thus far reviewed and discussed with patient. Workup includes:     EGD 4/3/24  [facility]  [address] [address]  [phone]  [phone]            DATE OF SERVICE: April 18, 2024  REFERRING PHYSICIAN: Bola Ladd CRNP  INDICATION:         MEDICATIONS:   PPI or H2RA therapy: Off        PROCEDURE:  The patient underwent a 24-hr impedance pH study. After an explanation of the procedure was provided,  xylocaine gel was applied to the nasal mucosa on one side.  The impedance pH catheter is configured with distal pH sensor.  The catheter is placed transnasally such that the pH sensor is located 5 cm above the upper border of the lower esophageal sphincter, as determined by esophageal manometry,  LES finder catheter or previous EGD.  The pH threshold for acid reflux was 4.0.  The patient tolerated the procedure well.     PH STUDY:  Day 1:     Number of reflux episodes: 89  Overall acid exposure time (%): 14.8% (abnormal)  Symptom correlation significant for any reported symptoms: (SI > 50% and SAP > 95%): No, however patient did not follow directions     DeMeester score: 43.4        IMPRESSION:  This was abnormal  study.   This study recorded increased amount of gastroesophageal reflux. There was  undetermined  correlation between recorded reflux events and reported symptoms.       FINAL DIAGNOSIS:  Abnormal study off therapy in terms of overall acid exposure and DeMeester score.  Patient did not follow instructions very well, so cannot really comment on symptom correlation or upright versus recumbent reflux     Lopez Montano DO , 4/18/2024          EGD 01/23/2024 -     [facility]  [address] [address] [phone]        DATE OF SERVICE:  1/23/24     PHYSICIAN(S):  Attending:   Lopez Montano DO      Fellow:   No Staff Documented         INDICATION:  Esophageal dysphagia     POST-OP DIAGNOSIS:  See the impression below.     PREPROCEDURE:  Informed consent was obtained for the procedure, including sedation.  Risks of perforation, hemorrhage, adverse drug reaction and aspiration were discussed. The patient was placed in the left lateral decubitus position.     Patient was explained about the risks and benefits of the procedure. Risks including but not limited to bleeding, infection, and perforation were explained in detail. Also explained about less than 100% sensitivity with the exam and other alternatives.     PROCEDURE: EGD     DETAILS OF PROCEDURE:   Patient was taken to the procedure room where a time out was performed to confirm correct patient and correct procedure. The patient underwent monitored anesthesia care, which was  administered by an anesthesia professional. The patient's blood pressure, ECG and oxygen were monitored throughout the procedure. The scope was introduced through the mouth and advanced to the second part of the duodenum. Retroflexion was performed in the fundus. The patient experienced no blood loss. The procedure was not difficult. The patient tolerated the procedure well. There were no apparent adverse events.      ANESTHESIA INFORMATION:  ASA: ASA status not filed in the log.  Anesthesia Type: Anesthesia type not filed in the log.     MEDICATIONS:  No administrations occurring from 1240 to 1254 on 01/23/24         FINDINGS:  Irregular Z-line        SPECIMENS:  * No specimens in log *        IMPRESSION:  Irregular Z-line        RECOMMENDATION:    Esophageal manometry                   Lopez Montano DO            Esophageal Manometry - 02/27/2024  Esophageal Manometry   Aristeo BeBirdiepaulette 63 y.o. male MRN: [id number]  [facility]  [address] [place] [address] [place] [address]  [phone]     Procedure Date: 2/27/2024  Referring Physician: Amanda Dempsey PA-C   Attending: Lopez Montano DO  Indications(s): Dysphagia  Motility Nurse: Samantha Schoendorfer     I have reviewed the indications for the procedure, the manometric recordings for the procedure, and the measured/calculated manometric parameters and other findings included in the accompanying motility note.          Impression:   High LES resting pressure with incomplete relaxation (high IRP)  Short DL's with simultaneous contractions with liquids (6/10) and viscous (10/10)  Incomplete bolus transit with liquids  Complete bolus transit with viscous     Post-procedure Diagnoses:    Distal esophageal spasm (patient on opiates)         UGI  UPPER GI SERIES - SINGLE CONTRAST     INDICATION: K22.4: Dyskinesia of esophagus  Z48.815: Encounter for surgical aftercare following surgery on the digestive system  Z98.84:  Bariatric surgery status  K91.2: Postsurgical malabsorption, not elsewhere classified  E66.01: Morbid (severe) obesity due to excess calories  K21.9: Gastro-esophageal reflux disease without esophagitis.     COMPARISON: None     TECHNIQUE: The patient was given barium by mouth and images of the esophagus, stomach, and small bowel were obtained.     IMAGES: 231     FLUOROSCOPY TIME: 1.9 min     FINDINGS:     The patient started by drinking thin contrast in the upright position. The first few boluses of contrast passed promptly through the gastroesophageal junction into the stomach. There was delayed passed contrast of subsequent boluses of contrast into the   stomach,, and this contrast transiently remained in the lower esophagus.     Expected size and shape of the stomach following sleeve gastrectomy.  No extraluminal contrast. No findings to suggest ulceration. No mucosal thickening. Normal appearance of the proximal small bowel.     No esophageal dysmotility. Normal primary stripping wave.     No hiatal hernia.     Small amount of gastroesophageal reflux.     IMPRESSION:     Mild delayed passage of contrast into the stomach is most likely due to diminished volume of the stomach. No esophageal dysmotility. Mild gastroesophageal reflux.     Workstation performed: [id number]     --------------------------------------------------------------------    Patient having dysphasia to solids and liquids and regurgitation as well as reflux    EGD and manometry reviewed which shows DeMeester score: 43.4  UGI also reviewed which shows mild delayed passage of contrast into the stomach is most likely due to diminished volume of the stomach. No esophageal dysmotility. Mild gastroesophageal reflux.     Suspecting small PEH on UGI images    Discussed surgical options with patient such as conversion to RNYGB and LINX.  He is not interested in conversion to RNYGB due to not being able to afford multivitamins.      Patient notes need  for brain surgery and heart attacks that he may need MRI's for. We discussed he can have MRI under specific Susy units. He is agreeable to LINX placement.     He needs a surgically procedure on his brain so he wants to proceed with this before starting the process for PEH repair with LINX.     We will refer for medical clearance preoperatively.    Of note, he is on chronic narcotics with pain pump    Patient will follow up after brain procedure to have us submit to insurance for LINX placement              Destinee Robins PA-C  Bariatric Surgery  5/20/2024  1:52 PM

## 2024-05-21 RX ORDER — CLOPIDOGREL BISULFATE 75 MG/1
75 TABLET ORAL DAILY
Qty: 90 TABLET | Refills: 1 | Status: SHIPPED | OUTPATIENT
Start: 2024-05-21 | End: 2024-05-24 | Stop reason: SDUPTHER

## 2024-05-22 ENCOUNTER — OFFICE VISIT (OUTPATIENT)
Dept: NEUROSURGERY | Facility: CLINIC | Age: 64
End: 2024-05-22
Payer: COMMERCIAL

## 2024-05-22 ENCOUNTER — TELEPHONE (OUTPATIENT)
Age: 64
End: 2024-05-22

## 2024-05-22 VITALS
DIASTOLIC BLOOD PRESSURE: 84 MMHG | RESPIRATION RATE: 18 BRPM | HEIGHT: 74 IN | BODY MASS INDEX: 38.89 KG/M2 | HEART RATE: 95 BPM | WEIGHT: 303 LBS | SYSTOLIC BLOOD PRESSURE: 126 MMHG | TEMPERATURE: 97.4 F | OXYGEN SATURATION: 98 %

## 2024-05-22 DIAGNOSIS — I63.9 CEREBROVASCULAR ACCIDENT (CVA), UNSPECIFIED MECHANISM (HCC): ICD-10-CM

## 2024-05-22 DIAGNOSIS — I67.1 CEREBRAL ANEURYSM: Primary | ICD-10-CM

## 2024-05-22 PROCEDURE — 99214 OFFICE O/P EST MOD 30 MIN: CPT | Performed by: NEUROLOGICAL SURGERY

## 2024-05-22 NOTE — TELEPHONE ENCOUNTER
Pt would like to schedule his link surgery.  I transferred him to Christie in the office to help him

## 2024-05-22 NOTE — PROGRESS NOTES
Patient Id: Aristeo Hall is a 63 y.o. male        Handedness: Right    Assessment/Plan:    Diagnoses and all orders for this visit:    Cerebral aneurysm  -     MRA head w wo contrast; Future    Cerebrovascular accident (CVA), unspecified mechanism (HCC)  -     MRA head w wo contrast; Future        Discussion and summary:   1.  Prior ACOM aneurysm coiled in 2004 at [facility]  Status post arteriogram 5/3/2024  We reviewed the results of his arteriogram in detail.  There is a 3 to 5 mm remnant at the base of the neck of the aneurysm near the origin of the A2 vessels.  We discussed the risks associated with this in terms of the natural history of rupture in context of ISIUA and UCAS.  Unfortunately, I do not have any comparative imaging from his initial treatment in 2004 until now.  He does have an MRI of his brain from 2017 which I will attempt to get access of.    Given the small amount of residual over the last 20 years without any interval scan I would favor continued observation of this.  We will repeat an MRI in a year.  We did discuss upfront treatment however given the risk of this treatment and the origin of the A2's I am not completely confident I would be able to completely occlude the origin of the aneurysm.  Should there be any change or growth of the aneurysm at that time we would consider stent assisted coiling.    2.  Distal esophageal instrumentation  He is interested in having a titanium ring placed around his esophagus to help with decreased reflux.  This appears to be MRI compatible up to 1-1/2 Susy.  As long as it is appropriately conditional for machine I do not see any reason to not proceed with this.    I have spent a total time of 30 minutes on 05/22/24 in caring for this patient including Diagnostic results, Prognosis, Risks and benefits of tx options, Instructions for management, Patient and family education, Importance of tx compliance, Risk factor reductions, Impressions,  Counseling / Coordination of care, Documenting in the medical record, Reviewing / ordering tests, medicine, procedures  , and Obtaining or reviewing history  .       Chief Complaint: Follow-up (2 week Diagnostic Angio)      HPI:   This is a pleasant 63-year-old gentleman with significant medical history including stroke, ARLEEN, obesity, coronary artery disease status post PCI, COPD, hypertension, diabetes, hyperlipidemia, CHF and aneurysm treated back in 2004.  He initially presented to the emergency room with stroke concern and an MRA at that time demonstrated residual aneurysm.  This was his first interval scan since treatment as far as I can tell.  He was started on aspirin and Plavix.    He has multiple chronic complaints including headaches, blurred vision, double vision, imbalance, and pain.  He denies any new changes.        Review of Systems   Constitutional:  Positive for fatigue.   HENT:  Negative for tinnitus.    Eyes:  Positive for visual disturbance (always has double vision).   Respiratory: Negative.     Cardiovascular: Negative.    Gastrointestinal:  Positive for constipation.   Endocrine: Negative.    Genitourinary: Negative.    Musculoskeletal:  Positive for gait problem (uses walker for support, had a fall a couple days at home inthe yard moving things in his shed, did not seek treatment after fall had some chest pain and he phoned his PCP).   Skin: Negative.    Allergic/Immunologic: Negative.    Neurological:  Positive for weakness (in both legs), numbness (in both legs starting at the thigh and travels to the feet) and headaches (headaches about 4/5 times a week). Negative for speech difficulty.   Hematological: Negative.    Psychiatric/Behavioral:  Positive for confusion (can be confused and forgetful at times).        Physical Exam  Vitals:    05/22/24 1005   BP: 126/84   Pulse: 95   Resp: 18   Temp: (!) 97.4 °F (36.3 °C)   SpO2: 98%   He is well appearing.  Affect is appropriate. His BMI is Body  mass index is 39.43 kg/m².. He is awake alert and oriented.  Hearing and vision are grossly intact.   His pupils are equal round reactive to light.  His extraocular movements are intact.  His face is symmetric.  Tongue is midline.  Facial sensation is intact and symmetric throughout.  Shoulder shrug is 5/5.  There is no drift or dysmetria.    He has full strength in his bilateral upper and lower extremities.  He ambulates with a walker.    The following portions of the patient's history were reviewed and updated as appropriate: allergies, current medications, past family history, past medical history, past social history, past surgical history, and problem list.    Active Ambulatory Problems     Diagnosis Date Noted    Chronic diastolic congestive heart failure ([facility])     Coronary artery disease of native artery of native heart with stable angina pectoris ([facility]) 01/14/2016    Morbid obesity ([facility]) 01/15/2016    History of CVA (cerebrovascular accident) 01/15/2016    Stroke ([facility])     Stented coronary artery     Obstructive sleep apnea syndrome     CPAP (continuous positive airway pressure) dependence     Cerebral aneurysm     Chronic pain disorder     Constipated     Sleep apnea     Chest pain 10/12/2017    Stage 2 chronic kidney disease 10/12/2017    GERD (gastroesophageal reflux disease)     Opioid dependence, continuous ([facility]) 11/06/2017    Esophageal dysphagia 01/31/2018    Chronic migraine without aura without status migrainosus, not intractable 02/05/2018    Hyperlipidemia 02/09/2015    Vitamin D deficiency 08/01/2018    Symptomatic bradycardia 08/08/2019    Bilateral foot pain 08/11/2019    Orthostatic hypotension 08/12/2019    Primary osteoarthritis of both knees 08/27/2019    Mild cognitive impairment 10/18/2019    COPD (chronic obstructive pulmonary disease) ([facility]) 01/29/2020    Major depressive disorder, recurrent, moderate ([facility]) 01/30/2020    Radiculopathy, lumbosacral region 05/08/2020    Neuropathy  05/08/2020    Presence of intrathecal pump 05/08/2020    Skin inflammation 08/21/2020    Status post insertion of spinal cord stimulator 03/31/2021    History of gastric sleeve procedure 08/19/2023    Myocarditis ([facility]) 09/06/2023    Arteriosclerosis of artery of extremity ([facility]) 09/08/2023    Hiatal hernia 09/27/2023    Type 2 diabetes mellitus with diabetic neuropathy, without long-term current use of insulin ([facility]) 12/29/2023    ARLEEN (obstructive sleep apnea) 03/20/2024    Chronic obstructive pulmonary disease, unspecified COPD type ([facility]) 03/20/2024    Stroke-like symptoms 03/28/2024    Abnormal MRI of head 03/30/2024    Blurring of visual image 04/01/2024    Chronic migraine without aura 11/26/2013     Resolved Ambulatory Problems     Diagnosis Date Noted    Essential hypertension     Type 2 diabetes mellitus with renal complication ([facility])     CHF (congestive heart failure) ([facility])     Acute on chronic diastolic congestive heart failure ([facility])     MI (myocardial infarction) ([facility])     Acute on chronic diastolic congestive heart failure ([facility])     Hypokalemia 10/14/2016    Acute on chronic diastolic congestive heart failure ([facility])     Type 2 diabetes mellitus with hyperglycemia, with long-term current use of insulin ([facility])     Migraine     Acute tracheobronchitis 05/10/2017    Dyspnea on exertion 09/05/2017    Bilateral leg edema 09/05/2017    Chronic respiratory failure ([facility]) 09/05/2017    Leukocytosis 10/12/2017    Acute renal failure superimposed on stage 3 chronic kidney disease ([facility]) 11/06/2017    Dysphagia 08/02/2018    Fall at home 05/03/2019    Closed nondisplaced fracture of body of left calcaneus 05/03/2019    Pain and swelling of left knee 05/05/2019    SIRS (systemic inflammatory response syndrome) ([facility]) 05/06/2019    Fall     Decubitus ulcer of sacral region, unstageable ([facility]) 06/03/2019    Type 2 diabetes mellitus with diabetic neuropathy, with long-term current use of insulin ([facility]) 10/18/2019    Malfunction of  intrathecal infusion pump ([facility]) 05/08/2020    Pink eye disease of right eye 05/12/2021    Type 2 diabetes mellitus, with long-term current use of insulin ([facility]) 10/11/2017     Past Medical History:   Diagnosis Date    Altered gait     Alzheimer disease ([facility])     Angina pectoris ([facility])     Anxiety     Arthritis     Brain aneurysm     Cardiac disease     Chronic kidney disease     Chronic pain     Constipation     Coronary artery disease     Decubital ulcer     Dependent on walker for ambulation     Depression     Diabetes mellitus ([facility])     Difficulty walking     Dizziness     Enlarged prostate     Esophageal stricture In file    Esophageal varices ([facility])     Heart failure ([facility])     Hx of gastric bypass 11/19/2018    Hypercholesterolemia     Hypertension     Ingrown toenail     Myocardial infarction ([facility]) 2003    Oxygen dependent     Pressure injury of skin     Pressure injury of skin of sacral region 06/03/2019    Renal disorder     Shortness of breath     Skin abnormality     Urinary frequency     Use of cane as ambulatory aid     Wears dentures     Wears glasses     Wears glasses     Wheelchair dependent        Past Surgical History:   Procedure Laterality Date    BACK SURGERY      BRAIN SURGERY      CARDIAC CATHETERIZATION      with stents    CARDIAC CATHETERIZATION N/A 8/18/2023    Procedure: Cardiac Coronary Angiogram;  Surgeon: Horacio Weiner MD;  Location: [facility];  Service: Cardiology    CEREBRAL ANEURYSM REPAIR      with coils    COLONOSCOPY      ESOPHAGOGASTRODUODENOSCOPY N/A 7/1/2016    Procedure: ESOPHAGOGASTRODUODENOSCOPY (EGD);  Surgeon: Reno Christiansen MD;  Location: [facility];  Service:     GASTRIC BYPASS  11/19/2018    HERNIA REPAIR      HIATAL HERNIA REPAIR      INFUSION PUMP IMPLANTATION Left     morphine    INTRATHECAL PUMP IMPLANTATION Left 7/9/2020    Procedure: REVISION INTRATHECAL PAIN PUMP POCKET, LEFT ABDOMEN;  Surgeon: Homero Cho MD;  Location: [facility];  Service: Neurosurgery     KNEE ARTHROSCOPY Right     KNEE ARTHROSCOPY Right     PERONEAL NERVE DECOMPRESSION Right     WA DEBRIDEMENT OPEN WOUND FIRST 20 SQ CM/< N/A 6/6/2019    Procedure: EXCISIONAL DEBRIDEMENT OF SACRAL DECUBITUS ULCER;  Surgeon: Siddhartha Omer MD;  Location: [facility];  Service: General    WA ESOPHAGOGASTRODUODENOSCOPY TRANSORAL DIAGNOSTIC N/A 2/27/2017    Procedure: ESOPHAGOGASTRODUODENOSCOPY (EGD);  Surgeon: Reno Christiansen MD;  Location: [facility];  Service: Gastroenterology    WA ESOPHAGOGASTRODUODENOSCOPY TRANSORAL DIAGNOSTIC N/A 8/23/2018    Procedure: ESOPHAGOGASTRODUODENOSCOPY (EGD) with biopsy;  Surgeon: Reno Christiansen MD;  [facility]: [facility];  Service: Gastroenterology    WA IMPLTJ/RPLCMT ITHCL/EDRL DRUG NFS PRGRBL PUMP Left 10/13/2020    Procedure: EXPLORATION OF INTRATHECAL PAIN PUMP SYSTEM INTEGRITY FOR POSSIBLE REPLACEMENT OF CATHETER AND PUMP.;  Surgeon: Homero Cho MD;  Location: [facility];  Service: Neurosurgery    WA IMPLTJ/RPLCMT ITHCL/EDRL DRUG NFS SUBQ RSVR N/A 1/19/2017    Procedure: REMOVAL / EXCHANGE INTRATHECAL PAIN PUMP;  Surgeon: Que Leonard MD;  [facility];  Service: Orthopedics    WA IMPLTJ/RPLCMT ITHCL/EDRL DRUG NFS SUBQ RSVR N/A 5/16/2016    Procedure: REPLACEMENT AND PROGRAM PUMP ;  Surgeon: Que Leonard MD;  [facility];  Service: Orthopedics    WA PRQ IMPLTJ NSTIM ELECTRODE ARRAY EPIDURAL Right 3/17/2021    Procedure: INSERTION THORACIC DORSAL COLUMN SPINAL CORD STIMULATOR PERCUTANEOUS W IMPLANTABLE PULSE GENERATOR, RIGHT;  Surgeon: Homero Cho MD;  Location: [facility];  Service: Neurosurgery         Current Outpatient Medications:     albuterol (2.5 mg/3 mL) 0.083 % nebulizer solution, Take 3 mL (2.5 mg total) by nebulization every 6 (six) hours as needed for wheezing or shortness of breath, Disp: 720 mL, Rfl: 0    amLODIPine (NORVASC) 5 mg tablet, TAKE 1 TABLET (5 MG TOTAL) BY MOUTH DAILY., Disp: 90 tablet, Rfl: 0    ammonium lactate (LAC-HYDRIN) 12 % cream,  Apply topically as needed for dry skin, Disp: 385 g, Rfl: 2    aspirin 81 mg chewable tablet, Chew 1 tablet (81 mg total) daily, Disp: 90 tablet, Rfl: 1    atorvastatin (LIPITOR) 80 mg tablet, TAKE 1 TABLET (80 MG TOTAL) BY MOUTH DAILY AFTER DINNER, Disp: 90 tablet, Rfl: 0    baclofen 10 mg tablet, Take 10 mg by mouth 3 (three) times a day, Disp: , Rfl:     busPIRone (BUSPAR) 5 mg tablet, Take 5 mg by mouth 2 (two) times a day, Disp: , Rfl:     CALCIUM PO, Take 1 tablet by mouth daily, Disp: , Rfl:     carvedilol (COREG) 12.5 mg tablet, TAKE 1 TABLET BY MOUTH TWICE A DAY WITH FOOD, Disp: 60 tablet, Rfl: 3    clopidogrel (PLAVIX) 75 mg tablet, TAKE 1 TABLET BY MOUTH EVERY DAY, Disp: 90 tablet, Rfl: 1    Continuous Blood Gluc  (Dexcom G6 ) CANDACE, 1 DEXCOM G6  FOR CONTINUOUS GLUCOSE MONITORING, Disp: 1 each, Rfl: 0    Continuous Blood Gluc Sensor (Dexcom G6 Sensor) MISC, 3 PACK SENSOR FOR CONTINUOUS GLUCOSE MONITORING, Disp: 9 each, Rfl: 3    Continuous Blood Gluc Transmit (Dexcom G6 Transmitter) MISC, 1 TRANSMITTED EVERY 3 MONTHS FOR CONTINUOUS GLUCOSE MONITORING, Disp: 1 each, Rfl: 3    docusate sodium (COLACE) 100 mg capsule, Take 1 capsule (100 mg total) by mouth 2 (two) times a day, Disp: 180 capsule, Rfl: 3    ergocalciferol (VITAMIN D2) 50,000 units, Take 1 capsule (50,000 Units total) by mouth 2 (two) times a week, Disp: 24 capsule, Rfl: 0    Fluticasone-Salmeterol (Advair Diskus) 500-50 mcg/dose inhaler, Inhale 1 puff 2 (two) times a day Rinse mouth after use, Disp: 60 blister, Rfl: 3    folic acid (FOLVITE) 1 mg tablet, Take 1 tablet (1 mg total) by mouth daily, Disp: 90 tablet, Rfl: 1    gabapentin (NEURONTIN) 800 mg tablet, Take 800 mg by mouth 4 (four) times a day, Disp: , Rfl:     hydrocortisone 1 % lotion, Apply topically if needed for rash, Disp: 59 mL, Rfl: 1    Klor-Con M20 20 MEQ tablet, TAKE 1 TABLET BY MOUTH EVERY DAY, Disp: 90 tablet, Rfl: 0    lidocaine (LIDODERM) 5 %,  Apply 1 patch topically daily back, Disp: , Rfl:     losartan (COZAAR) 50 mg tablet, TAKE 1 TABLET BY MOUTH EVERY DAY, Disp: 90 tablet, Rfl: 1    methocarbamol (ROBAXIN) 750 mg tablet, TAKE 1 TABLET (750 MG TOTAL) BY MOUTH EVERY 6 (SIX) HOURS AS NEEDED FOR MUSCLE SPASMS, Disp: 120 tablet, Rfl: 0    naloxegol oxalate (MOVANTIK) 25 MG tablet, Take 1 tablet (25 mg total) by mouth daily in the early morning, Disp: 30 tablet, Rfl: 5    nitroglycerin (Nitrostat) 0.4 mg SL tablet, Place 1 tablet (0.4 mg total) under the tongue every 5 (five) minutes as needed for chest pain (pt has not  used recently), Disp: 30 tablet, Rfl: 0    nystatin (MYCOSTATIN) cream, Apply 2 g (1 Application total) topically 2 (two) times a day, Disp: 15 g, Rfl: 0    omeprazole (PriLOSEC) 40 MG capsule, Take 1 capsule (40 mg total) by mouth 2 (two) times a day before meals, Disp: 60 capsule, Rfl: 5    ondansetron (ZOFRAN) 4 mg tablet, Take 1 tablet (4 mg total) by mouth every 8 (eight) hours as needed for nausea or vomiting, Disp: 60 tablet, Rfl: 3    predniSONE 10 mg tablet, Take 2 a day for 1 week then 1 a day for 14 days, Disp: 28 tablet, Rfl: 0    ranolazine (RANEXA) 1000 MG SR tablet, TAKE 1 TABLET (1,000 MG TOTAL) BY MOUTH EVERY 12 HOURS, Disp: 60 tablet, Rfl: 3    tamsulosin (FLOMAX) 0.4 mg, TAKE 1 CAPSULE BY MOUTH EVERY DAY WITH DINNER, Disp: 90 capsule, Rfl: 1    traZODone (DESYREL) 100 mg tablet, Take 1 tablet (100 mg total) by mouth daily at bedtime, Disp: 30 tablet, Rfl: 0    vitamin B-12 (VITAMIN B-12) 1,000 mcg tablet, Take 1 tablet (1,000 mcg total) by mouth daily, Disp: 90 tablet, Rfl: 3    Cyanocobalamin (VITAMIN B-12 PO), Take 1 tablet by mouth daily (Patient not taking: Reported on 5/20/2024), Disp: , Rfl:     Empagliflozin (JARDIANCE) 10 MG TABS tablet, Take 1 tablet (10 mg total) by mouth every morning, Disp: 30 tablet, Rfl: 11    furosemide (LASIX) 40 mg tablet, Take once in AM daily. Take once daily in PM PRN weight gain,  edema., Disp: 180 tablet, Rfl: 3    Results/Data: We reviewed his imaging in detail as well as the report

## 2024-05-23 ENCOUNTER — TELEPHONE (OUTPATIENT)
Dept: BARIATRICS | Facility: CLINIC | Age: 64
End: 2024-05-23

## 2024-05-23 ENCOUNTER — TELEPHONE (OUTPATIENT)
Dept: NEUROSURGERY | Facility: CLINIC | Age: 64
End: 2024-05-23

## 2024-05-23 ENCOUNTER — VBI (OUTPATIENT)
Dept: ADMINISTRATIVE | Facility: OTHER | Age: 64
End: 2024-05-23

## 2024-05-23 ENCOUNTER — PATIENT OUTREACH (OUTPATIENT)
Dept: FAMILY MEDICINE CLINIC | Facility: HOSPITAL | Age: 64
End: 2024-05-23

## 2024-05-23 DIAGNOSIS — K44.9 HIATAL HERNIA: Primary | ICD-10-CM

## 2024-05-23 DIAGNOSIS — E11.40 TYPE 2 DIABETES MELLITUS WITH DIABETIC NEUROPATHY, WITHOUT LONG-TERM CURRENT USE OF INSULIN (HCC): Primary | ICD-10-CM

## 2024-05-23 NOTE — PROGRESS NOTES
Outpatient Care Management Note:    Care manager called Aristeo. He denies any further chest pain symptoms. He did not follow up with cardiology as instructed. He states that he will be having surgery for reflux.  He knows to seek emergency care for any chest pain requiring 3 sl nitroglycerin.     Aristeo states his weight is 302 lbs. He is aware to call cardiology if he gains 3 lb in 1 day or 5 lb in 1 week.     Aristeo states his blood sugars have continued to be elevated. He states that yesterday during the day they were averaging between 265-285.  Then suddenly it dropped to 158. This morning his sugar was 175. He thinks that since gaining his weight back, his diabetes is back. He is requesting a referral to endocrine. He also stated that he has not seen an eye doctor. He is requesting a referral.  CM will forward his concerns to Lisbeth Gold.     Aristeo has my contact information and will call with any questions.     InTWINLINXsket message received from Lisbeth Gold: I placed referral for both endo and ophthalmology. Please let him know this.     CM called Aristeo and gave him the contact information for endocrine  and [facility]: [phone]

## 2024-05-23 NOTE — TELEPHONE ENCOUNTER
Spoke to patient to let him know that we received the letter from his neurosurgeon and informed the patient that there was a referral to cardiology. Patient now needs to get approval from cardiology before being scheduled for surgery       Spoke to patient that we need a letter of clearance from neurosurgeon for clearance for Hilal Hernia surgery with Dr Brad Mauro. Informed patient that it could be faxed to us and gave the fax number. After letter is received a cardiac clearance referral will be placed.

## 2024-05-23 NOTE — TELEPHONE ENCOUNTER
Left a message for Neurology asking to addend the letter that his being cleared for Hernia surgery a long with having the Linx .

## 2024-05-23 NOTE — TELEPHONE ENCOUNTER
Received a call from patient stating he is in need of a letter of clearance for a procedure he is having on his esophagus.     Returned call to patient and advised this RN faxed letter to bariatrics. Letter created with info from Dr Avendano's last office note.     Patient was appreciative of the call back.

## 2024-05-24 ENCOUNTER — TELEPHONE (OUTPATIENT)
Age: 64
End: 2024-05-24

## 2024-05-24 DIAGNOSIS — I25.10 CORONARY ARTERY DISEASE INVOLVING NATIVE CORONARY ARTERY OF NATIVE HEART WITHOUT ANGINA PECTORIS: ICD-10-CM

## 2024-05-24 RX ORDER — CLOPIDOGREL BISULFATE 75 MG/1
75 TABLET ORAL DAILY
Qty: 90 TABLET | Refills: 1 | Status: SHIPPED | OUTPATIENT
Start: 2024-05-24

## 2024-05-24 NOTE — LETTER
Cardiology Pre Operative Clearance      PRE OPERATIVE CARDIAC RISK ASSESSMENT    05/24/24    Aristeo ATWOOD Rafael  1960  [id number]    Date of Surgery: TBD    Type of Surgery: Hernia Surgery     Surgeon: Dr. Brad Mauro    No Cardiac Contraindication for Planned Surgical Procedures    Anticoagulation: Clopidogrel -- OK to hold 5 days pre-op, restart post-op. Aspirin 81 mg daily -- do not interrupt in perioperative setting.    Physician Comment: No further cardiac pre-op work-up     Electronically Signed: Jones Taylor MD

## 2024-05-24 NOTE — TELEPHONE ENCOUNTER
Caller: Aristeo     Doctor: Dr. Taylor     Reason for call: Patient called and his having hernia surgery, unsure of date as of yet. Was just seen by Dr. Taylor on 3/28/24. Can patient be cleared. Please advise. Send fax to: [phone] for Dr. Brad Mauro's office.    Patient also called in the other day to have meds refilled for Plavix. It was sent to incorrect pharmacy and needs it to go to [facility] in [place]. I deleted [facility] out of the system because he had moved. Can someone please, have meds moved to correct pharmacy?     Call back#: [phone]

## 2024-05-24 NOTE — TELEPHONE ENCOUNTER
Medication: Plavix    Dose/Frequency: 75mg daily     Quantity: 90    Pharmacy: [facility]    Office:   [] PCP/Provider -   [x] Speciality/Provider - Yoni Garcia    Does the patient have enough for 3 days?   [] Yes   [x] No - Send as HP to POD

## 2024-05-24 NOTE — TELEPHONE ENCOUNTER
Generated letter and sent to Dr. Taylor.    Addressed refill in another encounter    unknown mechanism

## 2024-05-30 ENCOUNTER — CONSULT (OUTPATIENT)
Dept: ENDOCRINOLOGY | Facility: CLINIC | Age: 64
End: 2024-05-30
Payer: COMMERCIAL

## 2024-05-30 VITALS
BODY MASS INDEX: 39.44 KG/M2 | HEIGHT: 73 IN | SYSTOLIC BLOOD PRESSURE: 150 MMHG | DIASTOLIC BLOOD PRESSURE: 96 MMHG | WEIGHT: 297.6 LBS | HEART RATE: 80 BPM | OXYGEN SATURATION: 98 %

## 2024-05-30 DIAGNOSIS — E78.00 PURE HYPERCHOLESTEROLEMIA: ICD-10-CM

## 2024-05-30 DIAGNOSIS — E55.9 VITAMIN D DEFICIENCY: ICD-10-CM

## 2024-05-30 DIAGNOSIS — E11.40 TYPE 2 DIABETES MELLITUS WITH DIABETIC NEUROPATHY, WITHOUT LONG-TERM CURRENT USE OF INSULIN (HCC): ICD-10-CM

## 2024-05-30 DIAGNOSIS — Z98.84 BARIATRIC SURGERY STATUS: ICD-10-CM

## 2024-05-30 DIAGNOSIS — E11.65 TYPE 2 DIABETES MELLITUS WITH HYPERGLYCEMIA, WITHOUT LONG-TERM CURRENT USE OF INSULIN (HCC): Primary | ICD-10-CM

## 2024-05-30 DIAGNOSIS — I50.32 CHRONIC DIASTOLIC CONGESTIVE HEART FAILURE (HCC): ICD-10-CM

## 2024-05-30 PROCEDURE — 99204 OFFICE O/P NEW MOD 45 MIN: CPT | Performed by: INTERNAL MEDICINE

## 2024-05-30 PROCEDURE — 95251 CONT GLUC MNTR ANALYSIS I&R: CPT | Performed by: INTERNAL MEDICINE

## 2024-05-30 RX ORDER — ERGOCALCIFEROL 1.25 MG/1
50000 CAPSULE ORAL WEEKLY
Qty: 12 CAPSULE | Refills: 1 | Status: SHIPPED | OUTPATIENT
Start: 2024-05-30

## 2024-05-30 RX ORDER — ERGOCALCIFEROL 1.25 MG/1
50000 CAPSULE ORAL 2 TIMES WEEKLY
Qty: 24 CAPSULE | Refills: 1 | Status: SHIPPED | OUTPATIENT
Start: 2024-05-30 | End: 2024-05-30 | Stop reason: SDUPTHER

## 2024-05-30 NOTE — PROGRESS NOTES
Aristeo Hall 63 y.o. male MRN: [id number]    Encounter: [id number]      Assessment & Plan     Problem List Items Addressed This Visit          Cardiovascular and Mediastinum    Chronic diastolic congestive heart failure (HCC)    Relevant Medications    Empagliflozin (JARDIANCE) 10 MG TABS tablet       Endocrine    Type 2 diabetes mellitus with diabetic neuropathy, without long-term current use of insulin (HCC)    Relevant Medications    Empagliflozin (JARDIANCE) 10 MG TABS tablet    Type 2 diabetes mellitus with hyperglycemia, without long-term current use of insulin (HCC) - Primary     Advised to restart Jardiance.  Lab Results   Component Value Date    HGBA1C 6.6 (H) 04/05/2024   Focus on dietary modifications.  Offered MNT that he has currently declined.  Discussed that Jardiance will need to be stopped for 3 days before any procedures         Relevant Medications    Empagliflozin (JARDIANCE) 10 MG TABS tablet    Other Relevant Orders    Hemoglobin A1C    Albumin / creatinine urine ratio    Comprehensive metabolic panel    TSH, 3rd generation       Surgery/Wound/Pain    Bariatric surgery status    Relevant Medications    ergocalciferol (VITAMIN D2) 50,000 units       Other    Hyperlipidemia     Continue statins         Relevant Orders    Lipid Panel with Direct LDL reflex    Vitamin D deficiency     Secondary hyperparathyroidism due to vitamin D deficiency-he was on Drisdol twice a week for the past 2 months.  For now advised to take Drisdol once a week         Relevant Medications    ergocalciferol (VITAMIN D2) 50,000 units    Other Relevant Orders    Vitamin D 25 hydroxy    PTH, intact    Phosphorus      CC: Diabetes    History of Present Illness     HPI:    63-year-old male with type 2 diabetes referred here for evaluation-he has a history of type 2 diabetes for many years, was on basal bolus insulin therapy, he underwent  Gastric sleeve in 2019 and lost 180 lbs and insulin therapy was stopped.   Since then he gained 30-40 back and in the past 3 months have gained another 50 lbs     Was off meds until earlier this year - started jardaince  earlier this year for CHF -was advised to stop Jardiance 3 weeks back for a procedure and never restarted it      Aristeo [employer]   Device used dexcom  Home use       Indication   Type 2 Diabetes    More than 72 hours of data was reviewed. Report to be scanned to chart.     Date Range: may 17th- 30th 2024     Analysis of data:   Average Glucose: 165 mg/dl  SD : 34 mg/dl  Time in Target Range: 73%  Time Above Range: 27%  Time Below Range: 0%    Interpretation of data: Some postmeal hyperglycemia, especially after dinner and bedtime snacks     No polyuria , polydipsia ,     Stopped jardiance 3 weeks prior to angiogram and never restarted   + numbness and tingling in feet   C/o blurry vision , has upcoming eye exam       Review of Systems    Historical Information   Past Medical History:   Diagnosis Date    Acute on chronic diastolic congestive heart failure (HCC)     Altered gait     Alzheimer disease (HCC)     per patients,,early onset     Angina pectoris (HCC)     Anxiety     Arthritis     Brain aneurysm     coils placed    Cardiac disease     Chest pain 01/13/2016    Chronic kidney disease     Chronic pain     back/ right groin and rle- has morphine pump    Constipation     COPD (chronic obstructive pulmonary disease) (HCC)     Coronary artery disease     CPAP (continuous positive airway pressure) dependence     Decubital ulcer     sacral decub-occured 5/2019-sees wound care/debide in OR today 6/6/2019    Dependent on walker for ambulation     w/c for long distance    Depression     Diabetes mellitus (HCC)     insulin dependent    Difficulty walking     Dizziness     occ    Dysphagia     Enlarged prostate     Esophageal stricture In file    Esophageal varices (HCC)     Fall     Fall at home 05/03/2019    GERD (gastroesophageal reflux disease)     Heart failure  ([facility])     Hiatal hernia     Hx of gastric bypass 11/19/2018    Hypercholesterolemia     Hypertension     Ingrown toenail     MI (myocardial infarction) ([facility])     2017- stents x2    Migraine     Morbid obesity ([facility])     gastric bypass sleeve 11/2018-wt loss 125 lb    Myocardial infarction ([facility]) 2003    Neuropathy     Oxygen dependent     Q HS  2LPM with CPAP and prn during day 2-3 LPM     Pressure injury of skin     Pressure injury of skin of sacral region 06/03/2019    Added automatically from request for surgery [id number]    Renal disorder     Shortness of breath     Skin abnormality     sacral wound - covered with pad    Sleep apnea     Stented coronary artery     Stroke ([facility])     vision loss b/l  2005, residual R leg weakness    Type 2 diabetes mellitus with diabetic neuropathy, with long-term current use of insulin ([facility]) 10/18/2019    Type 2 diabetes mellitus with renal complication ([facility])     insulin dependent    Type 2 diabetes mellitus, with long-term current use of insulin ([facility]) 10/11/2017    Transitioned From: Diabetes mellitus type 2, uncontrolled    Urinary frequency     Use of cane as ambulatory aid     Wears dentures     Wears glasses     Wears glasses     Wheelchair dependent      Past Surgical History:   Procedure Laterality Date    BACK SURGERY      BRAIN SURGERY      CARDIAC CATHETERIZATION      with stents    CARDIAC CATHETERIZATION N/A 8/18/2023    Procedure: Cardiac Coronary Angiogram;  Surgeon: Horacio Weiner MD;  Location: [facility];  Service: Cardiology    CEREBRAL ANEURYSM REPAIR      with coils    COLONOSCOPY      ESOPHAGOGASTRODUODENOSCOPY N/A 7/1/2016    Procedure: ESOPHAGOGASTRODUODENOSCOPY (EGD);  Surgeon: Reno Christiansen MD;  Location: [facility];  Service:     GASTRIC BYPASS  11/19/2018    HERNIA REPAIR      HIATAL HERNIA REPAIR      INFUSION PUMP IMPLANTATION Left     morphine    INTRATHECAL PUMP IMPLANTATION Left 7/9/2020    Procedure: REVISION INTRATHECAL PAIN PUMP POCKET, LEFT  ABDOMEN;  Surgeon: Homero Cho MD;  Location: [facility];  Service: Neurosurgery    KNEE ARTHROSCOPY Right     KNEE ARTHROSCOPY Right     PERONEAL NERVE DECOMPRESSION Right     TX DEBRIDEMENT OPEN WOUND FIRST 20 SQ CM/< N/A 6/6/2019    Procedure: EXCISIONAL DEBRIDEMENT OF SACRAL DECUBITUS ULCER;  Surgeon: Siddhartha Omer MD;  [facility];  Service: General    TX ESOPHAGOGASTRODUODENOSCOPY TRANSORAL DIAGNOSTIC N/A 2/27/2017    Procedure: ESOPHAGOGASTRODUODENOSCOPY (EGD);  Surgeon: Reno Christiansen MD;  Location: [facility];  Service: Gastroenterology    TX ESOPHAGOGASTRODUODENOSCOPY TRANSORAL DIAGNOSTIC N/A 8/23/2018    Procedure: ESOPHAGOGASTRODUODENOSCOPY (EGD) with biopsy;  Surgeon: Reno Christiansen MD;  [facility]: [facility];  Service: Gastroenterology    TX IMPLTJ/RPLCMT ITHCL/EDRL DRUG NFS PRGRBL PUMP Left 10/13/2020    Procedure: EXPLORATION OF INTRATHECAL PAIN PUMP SYSTEM INTEGRITY FOR POSSIBLE REPLACEMENT OF CATHETER AND PUMP.;  Surgeon: Homero Cho MD;  Location: [facility];  Service: Neurosurgery    TX IMPLTJ/RPLCMT ITHCL/EDRL DRUG NFS SUBQ RSVR N/A 1/19/2017    Procedure: REMOVAL / EXCHANGE INTRATHECAL PAIN PUMP;  Surgeon: Que Leonard MD;  [facility];  Service: Orthopedics    TX IMPLTJ/RPLCMT ITHCL/EDRL DRUG NFS SUBQ RSVR N/A 5/16/2016    Procedure: REPLACEMENT AND PROGRAM PUMP ;  Surgeon: Que Leonard MD;  [facility];  Service: Orthopedics    TX PRQ IMPLTJ NSTIM ELECTRODE ARRAY EPIDURAL Right 3/17/2021    Procedure: INSERTION THORACIC DORSAL COLUMN SPINAL CORD STIMULATOR PERCUTANEOUS W IMPLANTABLE PULSE GENERATOR, RIGHT;  Surgeon: Homero Cho MD;  [facility];  Service: Neurosurgery     Social History   Social History     Substance and Sexual Activity   Alcohol Use Not Currently     Social History     Substance and Sexual Activity   Drug Use Not Currently    Types: Marijuana, Morphine    Comment: Medical card     Social History     Tobacco Use   Smoking Status Never    Smokeless Tobacco Never     Family History:   Family History   Problem Relation Age of Onset    Diabetes unspecified Mother     Diabetes Mother     Heart attack Father     Heart disease Father     Hypertension Father     Stroke Father     Diabetes unspecified Brother     Depression Brother     Mental illness Brother     COPD Brother     Drug abuse Brother     Diabetes unspecified Brother     Diabetes unspecified Maternal Grandmother     Diabetes unspecified Paternal Grandmother     Diabetes unspecified Paternal Uncle     ADD / ADHD Cousin     Colon cancer Neg Hx     Colon polyps Neg Hx        Meds/Allergies   Current Outpatient Medications   Medication Sig Dispense Refill    albuterol (2.5 mg/3 mL) 0.083 % nebulizer solution Take 3 mL (2.5 mg total) by nebulization every 6 (six) hours as needed for wheezing or shortness of breath 720 mL 0    amLODIPine (NORVASC) 5 mg tablet TAKE 1 TABLET (5 MG TOTAL) BY MOUTH DAILY. 90 tablet 0    ammonium lactate (LAC-HYDRIN) 12 % cream Apply topically as needed for dry skin 385 g 2    aspirin 81 mg chewable tablet Chew 1 tablet (81 mg total) daily 90 tablet 1    atorvastatin (LIPITOR) 80 mg tablet TAKE 1 TABLET (80 MG TOTAL) BY MOUTH DAILY AFTER DINNER 90 tablet 0    baclofen 10 mg tablet Take 10 mg by mouth 3 (three) times a day      busPIRone (BUSPAR) 5 mg tablet Take 5 mg by mouth 2 (two) times a day      CALCIUM PO Take 1 tablet by mouth daily      carvedilol (COREG) 12.5 mg tablet TAKE 1 TABLET BY MOUTH TWICE A DAY WITH FOOD 60 tablet 3    clopidogrel (PLAVIX) 75 mg tablet Take 1 tablet (75 mg total) by mouth daily 90 tablet 1    Continuous Blood Gluc  (Dexcom G6 ) CANDACE 1 DEXCOM G6  FOR CONTINUOUS GLUCOSE MONITORING 1 each 0    Continuous Blood Gluc Sensor (Dexcom G6 Sensor) MISC 3 PACK SENSOR FOR CONTINUOUS GLUCOSE MONITORING 9 each 3    Continuous Blood Gluc Transmit (Dexcom G6 Transmitter) MISC 1 TRANSMITTED EVERY 3 MONTHS FOR CONTINUOUS GLUCOSE  MONITORING 1 each 3    Cyanocobalamin (VITAMIN B-12 PO) Take 1 tablet by mouth daily      docusate sodium (COLACE) 100 mg capsule Take 1 capsule (100 mg total) by mouth 2 (two) times a day 180 capsule 3    Empagliflozin (JARDIANCE) 10 MG TABS tablet Take 1 tablet (10 mg total) by mouth every morning 30 tablet 11    ergocalciferol (VITAMIN D2) 50,000 units Take 1 capsule (50,000 Units total) by mouth once a week 12 capsule 1    Fluticasone-Salmeterol (Advair Diskus) 500-50 mcg/dose inhaler Inhale 1 puff 2 (two) times a day Rinse mouth after use 60 blister 3    folic acid (FOLVITE) 1 mg tablet Take 1 tablet (1 mg total) by mouth daily 90 tablet 1    furosemide (LASIX) 40 mg tablet Take once in AM daily. Take once daily in PM PRN weight gain, edema. 180 tablet 3    gabapentin (NEURONTIN) 800 mg tablet Take 800 mg by mouth 4 (four) times a day      hydrocortisone 1 % lotion Apply topically if needed for rash 59 mL 1    Klor-Con M20 20 MEQ tablet TAKE 1 TABLET BY MOUTH EVERY DAY 90 tablet 0    lidocaine (LIDODERM) 5 % Apply 1 patch topically daily back      losartan (COZAAR) 50 mg tablet TAKE 1 TABLET BY MOUTH EVERY DAY 90 tablet 1    methocarbamol (ROBAXIN) 750 mg tablet TAKE 1 TABLET (750 MG TOTAL) BY MOUTH EVERY 6 (SIX) HOURS AS NEEDED FOR MUSCLE SPASMS 120 tablet 0    naloxegol oxalate (MOVANTIK) 25 MG tablet Take 1 tablet (25 mg total) by mouth daily in the early morning 30 tablet 5    nitroglycerin (Nitrostat) 0.4 mg SL tablet Place 1 tablet (0.4 mg total) under the tongue every 5 (five) minutes as needed for chest pain (pt has not  used recently) 30 tablet 0    nystatin (MYCOSTATIN) cream Apply 2 g (1 Application total) topically 2 (two) times a day 15 g 0    omeprazole (PriLOSEC) 40 MG capsule Take 1 capsule (40 mg total) by mouth 2 (two) times a day before meals 60 capsule 5    ondansetron (ZOFRAN) 4 mg tablet Take 1 tablet (4 mg total) by mouth every 8 (eight) hours as needed for nausea or vomiting 60 tablet 3  "   ranolazine (RANEXA) 1000 MG SR tablet TAKE 1 TABLET (1,000 MG TOTAL) BY MOUTH EVERY 12 HOURS 60 tablet 3    tamsulosin (FLOMAX) 0.4 mg TAKE 1 CAPSULE BY MOUTH EVERY DAY WITH DINNER 90 capsule 1    traZODone (DESYREL) 100 mg tablet Take 1 tablet (100 mg total) by mouth daily at bedtime 30 tablet 0    vitamin B-12 (VITAMIN B-12) 1,000 mcg tablet Take 1 tablet (1,000 mcg total) by mouth daily 90 tablet 3     No current facility-administered medications for this visit.     No Known Allergies    Objective   Vitals: Blood pressure 150/96, pulse 80, height 6' 1\" (1.854 m), weight 135 kg (297 lb 9.6 oz), SpO2 98%.    Physical Exam  Vitals reviewed.   Constitutional:       General: He is not in acute distress.     Appearance: Normal appearance. He is obese. He is not ill-appearing, toxic-appearing or diaphoretic.   HENT:      Head: Normocephalic and atraumatic.   Eyes:      General: No scleral icterus.     Extraocular Movements: Extraocular movements intact.   Cardiovascular:      Rate and Rhythm: Normal rate and regular rhythm.      Heart sounds: Normal heart sounds. No murmur heard.  Pulmonary:      Effort: Pulmonary effort is normal. No respiratory distress.      Breath sounds: Normal breath sounds. No wheezing or rales.   Musculoskeletal:      Cervical back: Neck supple.      Right lower leg: No edema.      Left lower leg: No edema.   Lymphadenopathy:      Cervical: No cervical adenopathy.   Skin:     General: Skin is warm and dry.      Comments: Vascular changes bilateral lower extremities   Neurological:      General: No focal deficit present.      Mental Status: He is alert and oriented to person, place, and time.      Gait: Gait normal.   Psychiatric:         Mood and Affect: Mood normal.         Behavior: Behavior normal.         Thought Content: Thought content normal.         Judgment: Judgment normal.         The history was obtained from the review of the chart, patient.    Lab Results:   Lab Results "   Component Value Date/Time    Hemoglobin A1C 6.6 (H) 04/05/2024 11:47 AM    Hemoglobin A1C 6.2 (H) 03/28/2024 10:34 AM    Hemoglobin A1C 6.2 01/09/2024 01:33 PM    Hemoglobin A1C 5.8 09/19/2023 12:51 PM    WBC 12.44 (H) 04/05/2024 11:47 AM    WBC 10.33 (H) 04/01/2024 11:06 AM    WBC 11.01 (H) 03/31/2024 04:51 AM    Hemoglobin 15.0 04/05/2024 11:47 AM    Hemoglobin 13.1 04/01/2024 11:06 AM    Hemoglobin 12.3 03/31/2024 04:51 AM    Hematocrit 47.9 04/05/2024 11:47 AM    Hematocrit 41.2 04/01/2024 11:06 AM    Hematocrit 38.8 03/31/2024 04:51 AM    MCV 96 04/05/2024 11:47 AM    MCV 95 04/01/2024 11:06 AM    MCV 94 03/31/2024 04:51 AM    Platelets 274 04/05/2024 11:47 AM    Platelets 197 04/01/2024 11:06 AM    Platelets 199 03/31/2024 04:51 AM    BUN 11 04/05/2024 11:47 AM    BUN 14 04/01/2024 11:06 AM    BUN 16 03/31/2024 04:51 AM    Potassium 4.1 04/05/2024 11:47 AM    Potassium 3.6 04/01/2024 11:06 AM    Potassium 3.8 03/31/2024 04:51 AM    Chloride 101 04/05/2024 11:47 AM    Chloride 106 04/01/2024 11:06 AM    Chloride 107 03/31/2024 04:51 AM    CO2 29 04/05/2024 11:47 AM    CO2 22 04/01/2024 11:06 AM    CO2 26 03/31/2024 04:51 AM    Creatinine 1.16 04/05/2024 11:47 AM    Creatinine 1.24 04/01/2024 11:06 AM    Creatinine 1.18 03/31/2024 04:51 AM    AST 17 04/05/2024 11:47 AM    AST 13 08/28/2023 05:04 AM    AST 38 08/23/2023 11:52 AM    ALT 24 04/05/2024 11:47 AM    ALT 15 08/28/2023 05:04 AM    ALT 27 08/23/2023 11:52 AM    Total Protein 7.0 04/05/2024 11:47 AM    Total Protein 5.5 (L) 08/28/2023 05:04 AM    Total Protein 7.0 08/23/2023 11:52 AM    Albumin 3.8 04/05/2024 11:47 AM    Albumin 3.1 (L) 08/28/2023 05:04 AM    Albumin 3.8 08/23/2023 11:52 AM    HDL, Direct 26 (L) 03/29/2024 05:25 AM    HDL, Direct 30 (L) 08/19/2023 04:36 AM    Triglycerides 212 (H) 03/29/2024 05:25 AM    Triglycerides 130 08/19/2023 04:36 AM           Imaging Studies: I have personally reviewed pertinent reports.      Portions of the  "record may have been created with voice recognition software. Occasional wrong word or \"sound a like\" substitutions may have occurred due to the inherent limitations of voice recognition software. Read the chart carefully and recognize, using context, where substitutions have occurred.    "

## 2024-05-30 NOTE — ASSESSMENT & PLAN NOTE
Advised to restart Jardiance.  Lab Results   Component Value Date    HGBA1C 6.6 (H) 04/05/2024   Focus on dietary modifications.  Offered MNT that he has currently declined.  Discussed that Jardiance will need to be stopped for 3 days before any procedures

## 2024-05-30 NOTE — ASSESSMENT & PLAN NOTE
Secondary hyperparathyroidism due to vitamin D deficiency-he was on Drisdol twice a week for the past 2 months.  For now advised to take Drisdol once a week

## 2024-05-31 ENCOUNTER — TELEPHONE (OUTPATIENT)
Age: 64
End: 2024-05-31

## 2024-05-31 NOTE — TELEPHONE ENCOUNTER
Patient called- he forgot to ask Dr. Dias yesterday if he could have Ozempic or something similar for weight loss?    Also, his Dexcom is only covered if he has a script for an insulin. Please advise.

## 2024-06-03 ENCOUNTER — VBI (OUTPATIENT)
Dept: ADMINISTRATIVE | Facility: OTHER | Age: 64
End: 2024-06-03

## 2024-06-03 ENCOUNTER — CONSULT (OUTPATIENT)
Dept: NEUROLOGY | Facility: CLINIC | Age: 64
End: 2024-06-03
Payer: COMMERCIAL

## 2024-06-03 VITALS
HEART RATE: 73 BPM | DIASTOLIC BLOOD PRESSURE: 80 MMHG | TEMPERATURE: 98 F | WEIGHT: 298 LBS | HEIGHT: 73 IN | SYSTOLIC BLOOD PRESSURE: 156 MMHG | BODY MASS INDEX: 39.49 KG/M2

## 2024-06-03 DIAGNOSIS — G90.529 CRPS (COMPLEX REGIONAL PAIN SYNDROME), LOWER LIMB: Primary | ICD-10-CM

## 2024-06-03 DIAGNOSIS — R51.9 CHRONIC DAILY HEADACHE: ICD-10-CM

## 2024-06-03 DIAGNOSIS — G43.709 CHRONIC MIGRAINE WITHOUT AURA WITHOUT STATUS MIGRAINOSUS, NOT INTRACTABLE: ICD-10-CM

## 2024-06-03 DIAGNOSIS — G62.9 NEUROPATHY: ICD-10-CM

## 2024-06-03 DIAGNOSIS — E67.2 MEGAVITAMIN-B6 SYNDROME: ICD-10-CM

## 2024-06-03 DIAGNOSIS — G60.9 HEREDITARY AND IDIOPATHIC NEUROPATHY, UNSPECIFIED: ICD-10-CM

## 2024-06-03 PROBLEM — R29.90 STROKE-LIKE SYMPTOMS: Status: RESOLVED | Noted: 2024-03-28 | Resolved: 2024-06-03

## 2024-06-03 PROBLEM — E11.65 TYPE 2 DIABETES MELLITUS WITH HYPERGLYCEMIA, WITHOUT LONG-TERM CURRENT USE OF INSULIN (HCC): Status: RESOLVED | Noted: 2024-05-30 | Resolved: 2024-06-03

## 2024-06-03 PROCEDURE — 99215 OFFICE O/P EST HI 40 MIN: CPT | Performed by: PSYCHIATRY & NEUROLOGY

## 2024-06-03 RX ORDER — DEXAMETHASONE 2 MG/1
2 TABLET ORAL
Qty: 5 TABLET | Refills: 0 | Status: SHIPPED | OUTPATIENT
Start: 2024-06-03

## 2024-06-03 NOTE — PROGRESS NOTES
Patient ID: Aristeo Hall is a 63 y.o. male.    Assessment/Plan:    CRPS (complex regional pain syndrome), lower limb  Patient is a very pleasant 63-year-old male with a complex history bariatric surgery, bilateral occipital and left cerebellar lobe infarcts, right ACOM aneurysm, chronic migraine headaches, COPD, chronic pain disorder, constipation, coronary artery disease s/p stent, ARLEEN (pending CPAP), esophageal dysphagia, GERD, hiatal hernia, osteoarthritis, back pain s/p intrathecal pump, lumbosacral radiculopathy, type 2 diabetes, chronic kidney disease and vitamin D deficiency who presents for evaluation of pain in the lower extremities.    In summary, symptoms started in 2020 after his gastric sleeve surgery in 2019.  Other than that no clear inciting factor.  He reports having a distal burning/freezing pain in bilateral lower extremities in a stocking glove pattern up to around the ankle area however higher up on the right.  Patient has significant allodynia in bilateral distal lower extremity right worse than left.  This is caused difficulty with ambulation.  Patient cannot walk without shoes.    On physical exam patient with exquisite tenderness to light touch at the feet.  I was not able to manipulate his feet.  On command he was able to passively extend, flex, invert and invert at the ankle.  He does have some decreased range of motion.  He was very sensitive to pinprick and vibration.  Unable to appropriately test proprioception at the toes.  I did not attempt to check Achilles reflex.  Plantar reflex equivocal.  His distal lower extremities were warm and his capillary refill was normal.  There was loss of hair distally in the extremities patient with involuntary flexion of bilateral digits 2-5 and some swelling of the feet.    Impression: Given patient's presenting symptoms and his physical exam findings he does fit the criteria for complex regional pain syndrome.    Plan:  Prescription sent  for topical cream to treat CRPS with transdermal therapeutics.  Information given to patient  I expanded his neuropathy workup to evaluate for other things such as pyridoxine induced neuropathy  Recommend continuing vitamin D supplementation  Can continue gabapentin 800 mg 4 times daily for the time being  Will give a short 5-day course of Decadron 2 mg to be taken in the morning with breakfast to reduce inflammation  Given his excruciating pain I would like him to be seen by neuromuscular specialis.  Appreciate recommendations.    Chronic daily headache  In terms of his headaches she reports having a pressure-like pain in a bandlike formation that occurs at anytime of the day.  Associated symptoms include photophobia and occasional blurry vision.  He denies nausea or vomiting.  His headaches are made worse with sitting.  These occur 3-4 times in a day, each headache lasting approximately 45 minutes. Patient has a history of ARLEEN and is currently not on a CPAP.  He will be receiving 1 on the 11th of this month.  Patient reports taking over-the-counter pain medications every day.    On physical exam patient with visual field cuts which are secondary to his prior history of stroke.  Patient with mild generalized weakness left more so than right.    At this time, patient's headaches are likely multifactorial with components of ARLEEN that is not currently being treated with a CPAP however he will be getting it on the 11th of this month.  Over-the-counter medication overuse and polypharmacy could certainly be contributing to this as well as chronic untreated pain.    Plan:  Will not repeat imaging as he had an MRI brain with and without contrast on 3/29/2024 which showed chronic unchanged findings  Patient should follow-up in her son with neurosurgery as he has been doing  Will give a 5-day course of Decadron 2 mg to abort current headache cycle  I have a high suspicion that once he starts using his CPAP his headache  frequency will diminish  Can continue to follow-up with me for continued management of headaches       Diagnoses and all orders for this visit:    CRPS (complex regional pain syndrome), lower limb  -     Vitamin B6; Future  -     RADHA Screen w/ Reflex to Titer/Pattern; Future  -     Sedimentation rate, automated; Future  -     C-reactive protein; Future  -     Protein electrophoresis, serum; Future  -     Protein electrophoresis, urine  -     Vitamin B6; Future  -     Lyme Total AB W Reflex to IGM/IGG; Future  -     dexamethasone (DECADRON) 2 mg tablet; Take 1 tablet (2 mg total) by mouth daily with breakfast    Chronic migraine without aura without status migrainosus, not intractable  -     Ambulatory Referral to Neurology    Neuropathy  -     Ambulatory Referral to Neurology    Megavitamin-B6 syndrome  -     Vitamin B6; Future    Hereditary and idiopathic neuropathy, unspecified  -     Vitamin B6; Future    Chronic daily headache       Subjective:    HPI      Patient is a very pleasant 63-year-old male with a complex history bariatric surgery, bilateral occipital and left cerebellar lobe infarcts, right ACOM aneurysm, chronic migraine headaches, COPD, chronic pain disorder, constipation, coronary artery disease s/p stent, ARLEEN (pending CPAP), esophageal dysphagia, GERD, hiatal hernia, osteoarthritis, back pain s/p intrathecal pump, lumbosacral radiculopathy, type 2 diabetes, chronic kidney disease and vitamin D deficiency.  Patient was referred here for evaluation of migraines however his main concern today is his symptoms of neuropathy.    In terms of his neuropathy he reported that his symptoms started in 2020 after having his gastric sleeve surgery in 2019.  No other clear inciting factor.  He reports having a distal burning/freezing pain symmetrically at the feet in a stocking glove pattern up to around the ankle area.  This is caused difficulty walking, hypersensitivity when putting his feet on the floor.  He  cannot walk without shoes.  Patient sleeps with his shoes on.  Sometimes his slippers slide out of his feet and he does not notice this.  Since symptom onset his HbA1c has been trending up with a max of 6.6.  He recently got a glucose monitor.  Patient reports that he takes gabapentin 800 mg 4 times daily and this does not help.  He has a spinal cord stimulator and the pain still persists.  Patient has never had an EMG done before. Recent B 12 989. Vit D 13      In terms of his headaches she reports having a pressure-like pain in a bandlike formation that occurs at anytime of the day.  Associated symptoms include photophobia and occasional blurry vision.  He denies nausea or vomiting.  His headaches are made worse with sitting.  These occur 3-4 times in a day, each headache lasting approximately 45 minutes. Patient has a history of ARLEEN and is currently not on a CPAP.  He will be receiving 1 on the 11th of this month.  Patient reports taking over-the-counter pain medications every day.      Nonsmoker   No alcohol     Medication review:  Gabapentin 800 mg 4 times daily  Aspirin 81 mg  Lipitor 80 mg  Baclofen 10 mg  Plavix 75 mg  Vitamin B12 at 1000 mcg  Vitamin D 50,000 units weekly  Lidoderm patches  Robaxin 750 mg every 6 hours as needed  Ondansetron 4 mg every 8 hours as needed  Trazodone 100 mg nightly    Workup:  LDL 88  Vitamin D 13  B12 989    MRI brain with and without contrast (3/29/2024): No acute intracranial abnormality or abnormal enhancement. Unchanged chronic infarcts in bilateral occipital and left cerebellar lobes with minimal chronic microangiopathy.Sequela of metallic embolization of previously noted right anterior communicating artery aneurysm. Difficult to evaluate for residual or recurrent aneurysm on this study.     The following portions of the patient's history were reviewed and updated as appropriate: allergies, current medications, past family history, past medical history, past social  "history, past surgical history, and problem list and ros.         Objective:    Blood pressure 156/80, pulse 73, temperature 98 °F (36.7 °C), temperature source Temporal, height 6' 1\" (1.854 m), weight 135 kg (298 lb).    Physical Exam  Constitutional:       General: He is awake.      Appearance: He is obese.   HENT:      Head: Normocephalic and atraumatic.   Eyes:      General: Lids are normal.      Extraocular Movements: Extraocular movements intact.      Comments: Conjunctival injection    Skin:     Comments: Warm distal extremities.   Loss of hair in the distal extremities   Normal capillary refill    Neurological:      Mental Status: He is alert.      Deep Tendon Reflexes:      Reflex Scores:       Bicep reflexes are 1+ on the right side and 1+ on the left side.       Brachioradialis reflexes are 1+ on the right side and 1+ on the left side.       Patellar reflexes are 1+ on the right side and 1+ on the left side.  Psychiatric:         Speech: Speech normal.         Neurological Exam  Mental Status  Awake and alert. Oriented to person, place and time. Speech is normal. Language is fluent with no aphasia. Attention and concentration are normal. Fund of knowledge is appropriate for level of education.    Cranial Nerves  CN II: Vision test: Conjunctival injection .  CN III, IV, VI: Extraocular movements intact bilaterally. Normal lids and orbits bilaterally.  CN V: Facial sensation is normal.  CN VII: Full and symmetric facial movement.  CN VIII: Hearing is normal.  CN IX, X: Palate elevates symmetrically  OD visual field cut in upper nasal and temporal quadrants  OS visual field cut in upper nasal quadrant.    Motor                                               Right                     Left  Deltoid                                   4+                          4   Biceps                                   4+                          4-   Triceps                                  4+                          4- "   Iliopsoas                               5                          4  Unable to test plantar flexion, extension, inversion and eversion secondary to significant hyperesthesia  .    Sensory  Allodynia in the distal lower extremities  Unable to test proprioception at the toes  Patient significantly sensitive to vibration sense and temperature  Proximally patient with decreased pinprick sensation on the right anterior and lateral aspect of the thigh..    Reflexes                                            Right                      Left  Brachioradialis                    1+                         1+  Biceps                                 1+                         1+  Patellar                                1+                         1+    Right pathological reflexes: Crossed adductor absent.  Left pathological reflexes: Crossed adductor absent.    Gait  Casual gait: Unable to rise from chair without using arms.  Walks with assistance of a walker, mostly bears weight at his heels.        ROS:    Review of Systems    Constitutional:  Negative for appetite change, fatigue and fever.   HENT: Negative.  Negative for hearing loss, tinnitus, trouble swallowing and voice change.    Eyes: Negative.  Negative for photophobia, pain and visual disturbance.   Respiratory: Negative.  Negative for shortness of breath.    Cardiovascular: Negative.  Negative for palpitations.   Gastrointestinal: Negative.  Negative for nausea and vomiting.   Endocrine: Negative.  Negative for cold intolerance.   Genitourinary: Negative.  Negative for dysuria, frequency and urgency.   Musculoskeletal:  Negative for back pain, gait problem, myalgias, neck pain and neck stiffness.   Skin: Negative.  Negative for rash.   Allergic/Immunologic: Negative.    Neurological:  Positive for numbness (Legs/feet). Negative for dizziness, tremors, seizures, syncope, facial asymmetry, speech difficulty, weakness, light-headedness and headaches.   Hematological:  Negative.  Does not bruise/bleed easily.   Psychiatric/Behavioral: Negative.  Negative for confusion, hallucinations and sleep disturbance.    All other systems reviewed and are negative.

## 2024-06-03 NOTE — TELEPHONE ENCOUNTER
06/03/24 9:41 AM     [employer] SmartForm used to document caregap status.    Ashley Duvall MA   
09-Oct-2023 22:32

## 2024-06-04 NOTE — TELEPHONE ENCOUNTER
Covering for Dr. Dias  If Dexcom not covered, Can use fingersticks.   Please send BG readings for review  IF he needs glucometer/strips/lancets let us know.     Ozempic may be an option but jardiance just resumed  Would stick with Jardiance for now and discuss option for ozempic at follow up visit.

## 2024-06-04 NOTE — ASSESSMENT & PLAN NOTE
In terms of his headaches she reports having a pressure-like pain in a bandlike formation that occurs at anytime of the day.  Associated symptoms include photophobia and occasional blurry vision.  He denies nausea or vomiting.  His headaches are made worse with sitting.  These occur 3-4 times in a day, each headache lasting approximately 45 minutes. Patient has a history of ARLEEN and is currently not on a CPAP.  He will be receiving 1 on the 11th of this month.  Patient reports taking over-the-counter pain medications every day.    On physical exam patient with visual field cuts which are secondary to his prior history of stroke.  Patient with mild generalized weakness left more so than right.    At this time, patient's headaches are likely multifactorial with components of ARLEEN that is not currently being treated with a CPAP however he will be getting it on the 11th of this month.  Over-the-counter medication overuse and polypharmacy could certainly be contributing to this as well as chronic untreated pain.    Plan:  Will not repeat imaging as he had an MRI brain with and without contrast on 3/29/2024 which showed chronic unchanged findings  Patient should follow-up in her son with neurosurgery as he has been doing  Will give a 5-day course of Decadron 2 mg to abort current headache cycle  I have a high suspicion that once he starts using his CPAP his headache frequency will diminish  Can continue to follow-up with me for continued management of headaches

## 2024-06-06 DIAGNOSIS — I50.32 CHRONIC DIASTOLIC CONGESTIVE HEART FAILURE (HCC): ICD-10-CM

## 2024-06-06 DIAGNOSIS — I21.3 STEMI (ST ELEVATION MYOCARDIAL INFARCTION) (HCC): ICD-10-CM

## 2024-06-06 DIAGNOSIS — I25.10 CORONARY ARTERY DISEASE INVOLVING NATIVE CORONARY ARTERY OF NATIVE HEART WITHOUT ANGINA PECTORIS: ICD-10-CM

## 2024-06-07 ENCOUNTER — PATIENT OUTREACH (OUTPATIENT)
Dept: FAMILY MEDICINE CLINIC | Facility: HOSPITAL | Age: 64
End: 2024-06-07

## 2024-06-07 ENCOUNTER — APPOINTMENT (OUTPATIENT)
Dept: LAB | Facility: CLINIC | Age: 64
End: 2024-06-07
Payer: COMMERCIAL

## 2024-06-07 DIAGNOSIS — I50.32 CHRONIC DIASTOLIC CONGESTIVE HEART FAILURE (HCC): ICD-10-CM

## 2024-06-07 DIAGNOSIS — I21.3 STEMI (ST ELEVATION MYOCARDIAL INFARCTION) (HCC): ICD-10-CM

## 2024-06-07 DIAGNOSIS — G60.9 HEREDITARY AND IDIOPATHIC NEUROPATHY, UNSPECIFIED: ICD-10-CM

## 2024-06-07 DIAGNOSIS — I25.10 CORONARY ARTERY DISEASE INVOLVING NATIVE CORONARY ARTERY OF NATIVE HEART WITHOUT ANGINA PECTORIS: ICD-10-CM

## 2024-06-07 DIAGNOSIS — E67.2 MEGAVITAMIN-B6 SYNDROME: ICD-10-CM

## 2024-06-07 DIAGNOSIS — G90.529 CRPS (COMPLEX REGIONAL PAIN SYNDROME), LOWER LIMB: ICD-10-CM

## 2024-06-07 LAB
B BURGDOR IGG+IGM SER QL IA: NEGATIVE
CRP SERPL QL: 15.2 MG/L
ERYTHROCYTE [SEDIMENTATION RATE] IN BLOOD: 40 MM/HOUR (ref 0–19)

## 2024-06-07 PROCEDURE — 86038 ANTINUCLEAR ANTIBODIES: CPT

## 2024-06-07 PROCEDURE — 84207 ASSAY OF VITAMIN B-6: CPT

## 2024-06-07 PROCEDURE — 36415 COLL VENOUS BLD VENIPUNCTURE: CPT

## 2024-06-07 PROCEDURE — 84165 PROTEIN E-PHORESIS SERUM: CPT

## 2024-06-07 PROCEDURE — 86334 IMMUNOFIX E-PHORESIS SERUM: CPT

## 2024-06-07 PROCEDURE — 85652 RBC SED RATE AUTOMATED: CPT

## 2024-06-07 PROCEDURE — 86618 LYME DISEASE ANTIBODY: CPT

## 2024-06-07 PROCEDURE — 86140 C-REACTIVE PROTEIN: CPT

## 2024-06-07 RX ORDER — AMLODIPINE BESYLATE 5 MG/1
5 TABLET ORAL DAILY
Qty: 90 TABLET | Refills: 0 | Status: SHIPPED | OUTPATIENT
Start: 2024-06-07 | End: 2024-06-07 | Stop reason: SDUPTHER

## 2024-06-07 RX ORDER — AMLODIPINE BESYLATE 5 MG/1
5 TABLET ORAL DAILY
Qty: 90 TABLET | Refills: 0 | Status: SHIPPED | OUTPATIENT
Start: 2024-06-07

## 2024-06-07 RX ORDER — POTASSIUM CHLORIDE 20 MEQ/1
20 TABLET, EXTENDED RELEASE ORAL DAILY
Qty: 90 TABLET | Refills: 0 | Status: SHIPPED | OUTPATIENT
Start: 2024-06-07

## 2024-06-07 RX ORDER — ATORVASTATIN CALCIUM 80 MG/1
80 TABLET, FILM COATED ORAL
Qty: 90 TABLET | Refills: 0 | Status: SHIPPED | OUTPATIENT
Start: 2024-06-07 | End: 2024-06-07 | Stop reason: SDUPTHER

## 2024-06-07 RX ORDER — ATORVASTATIN CALCIUM 80 MG/1
80 TABLET, FILM COATED ORAL
Qty: 90 TABLET | Refills: 0 | Status: SHIPPED | OUTPATIENT
Start: 2024-06-07

## 2024-06-07 RX ORDER — POTASSIUM CHLORIDE 1500 MG/1
20 TABLET, EXTENDED RELEASE ORAL DAILY
Qty: 90 TABLET | Refills: 0 | Status: SHIPPED | OUTPATIENT
Start: 2024-06-07 | End: 2024-06-07 | Stop reason: SDUPTHER

## 2024-06-07 NOTE — PROGRESS NOTES
"Outpatient Care Management Note:    CM called Aristeo. He states that he did see endocrine and restarted his jardiance. His blood sugars are averaging 190-250.  Currently, it is 123 on his dexcom. He states that he does not eat sweets, rice or pasta. He tries to watch his diet.     Aristeo is on decadron for 5 days per neurology to try and decrease his inflammation and help his foot pain. CM reviewed that this will increase his sugars. He will continue to monitor his sugars and will call endocrine with any concerns.  Aristeo did note that he is concerned his dexcom will not be covered now that he is not on insulin. I instructed him to talk with endocrine. He states he will not do fingersticks.     Aristeo is scheduled with neuromuscular doctor on 7/2. H is waiting for his topical cream to be delivered.     Aristeo also scheduled his eye doctor appt for next month. He did not recall the exact date.     Aristeo received his cpap machine but did not use it yet. CM strongly encouraged him to start using it as neurology felt it may improve his headache symptoms. He states that he had a cpap in the past, so he knows how to use it. He noted that he does get frustrated with it, because he does not like the masks. He has tried different types.     Aristeo is waiting to hear from bariatrics to see if his reflux surgery can be scheduled.     Ariseto has been doing a great job self managing his care.  CM attempted to close the referral but he requested ongoing outreach stating that the only reason he has gotten things done is because he knows that I would be calling. He states that prior to CM outreach he was \"lost to care\". CM agreed to follow up in 2-3 weeks.   "

## 2024-06-07 NOTE — TELEPHONE ENCOUNTER
Not a dup. Patient called and said medications needed to be sent to [facility]; was sent to the wrong pharmacy.           Reason for call:   [x] Refill   [] Prior Auth  [] Other:     Office:   [] PCP/Provider -   [x] Specialty/Provider - Cardio    Medication: Klor-Con    Dose/Frequency: 20 MEQ tablet    Quantity: #90    Pharmacy: [facility]    Does the patient have enough for 3 days?   [] Yes   [x] No - Send as HP to POD                  Reason for call:   [x] Refill   [] Prior Auth  [] Other:     Office:   [] PCP/Provider -   [x] Specialty/Provider -     Medication: Norvasc    Dose/Frequency: 5 mg     Quantity: #90    Pharmacy: [facility]    Does the patient have enough for 3 days?   [] Yes   [x] No - Send as HP to POD                    Reason for call:   [x] Refill   [] Prior Auth  [] Other:     Office:   [] PCP/Provider -   [x] Specialty/Provider -     Medication: Lipitor    Dose/Frequency: 80 mg     Quantity: #90    Pharmacy: [facility]    Does the patient have enough for 3 days?   [] Yes   [x] No - Send as HP to POD

## 2024-06-08 LAB — ANA SER QL IA: NEGATIVE

## 2024-06-10 LAB
ALBUMIN SERPL ELPH-MCNC: 3.26 G/DL (ref 3.2–5.1)
ALBUMIN SERPL ELPH-MCNC: 51.8 % (ref 48–70)
ALPHA1 GLOB SERPL ELPH-MCNC: 0.32 G/DL (ref 0.15–0.47)
ALPHA1 GLOB SERPL ELPH-MCNC: 5 % (ref 1.8–7)
ALPHA2 GLOB SERPL ELPH-MCNC: 0.83 G/DL (ref 0.42–1.04)
ALPHA2 GLOB SERPL ELPH-MCNC: 13.2 % (ref 5.9–14.9)
BETA GLOB ABNORMAL SERPL ELPH-MCNC: 0.51 G/DL (ref 0.31–0.57)
BETA1 GLOB SERPL ELPH-MCNC: 8.1 % (ref 4.7–7.7)
BETA2 GLOB SERPL ELPH-MCNC: 8.5 % (ref 3.1–7.9)
BETA2+GAMMA GLOB SERPL ELPH-MCNC: 0.54 G/DL (ref 0.2–0.58)
GAMMA GLOB ABNORMAL SERPL ELPH-MCNC: 0.84 G/DL (ref 0.4–1.66)
GAMMA GLOB SERPL ELPH-MCNC: 13.4 % (ref 6.9–22.3)
IGG/ALB SER: 1.07 {RATIO} (ref 1.1–1.8)
INTERPRETATION UR IFE-IMP: NORMAL
PROT PATTERN SERPL ELPH-IMP: ABNORMAL
PROT SERPL-MCNC: 6.3 G/DL (ref 6.4–8.2)

## 2024-06-10 PROCEDURE — 86334 IMMUNOFIX E-PHORESIS SERUM: CPT | Performed by: PATHOLOGY

## 2024-06-10 PROCEDURE — 84165 PROTEIN E-PHORESIS SERUM: CPT | Performed by: PATHOLOGY

## 2024-06-11 ENCOUNTER — TELEPHONE (OUTPATIENT)
Age: 64
End: 2024-06-11

## 2024-06-11 DIAGNOSIS — I25.10 CORONARY ARTERY DISEASE INVOLVING NATIVE CORONARY ARTERY OF NATIVE HEART WITHOUT ANGINA PECTORIS: ICD-10-CM

## 2024-06-11 NOTE — TELEPHONE ENCOUNTER
Patient was calling inquiring kiley prior authorization was started on her Dexcom G6 yet. Please update the patient when possible.

## 2024-06-12 ENCOUNTER — TELEPHONE (OUTPATIENT)
Dept: FAMILY MEDICINE CLINIC | Facility: HOSPITAL | Age: 64
End: 2024-06-12

## 2024-06-12 ENCOUNTER — TELEPHONE (OUTPATIENT)
Dept: BARIATRICS | Facility: CLINIC | Age: 64
End: 2024-06-12

## 2024-06-12 DIAGNOSIS — Z12.11 ENCOUNTER FOR SCREENING COLONOSCOPY: Primary | ICD-10-CM

## 2024-06-12 LAB — VIT B6 SERPL-MCNC: 18.8 UG/L (ref 3.4–65.2)

## 2024-06-12 RX ORDER — NITROGLYCERIN 0.4 MG/1
0.4 TABLET SUBLINGUAL
Qty: 25 TABLET | Refills: 2 | Status: SHIPPED | OUTPATIENT
Start: 2024-06-12

## 2024-06-12 NOTE — TELEPHONE ENCOUNTER
Spoke w/pt regarding the cologuard and he stated he never received it. I ordered a new one and advised pt if he does not get in within two weeks to call us.

## 2024-06-12 NOTE — TELEPHONE ENCOUNTER
Patient called in to let us know he has received clearances with cardiology for surgery with Dr Brad Mauro, he is interested in moving forward with surgery. He can be reached at [phone] to schedule.

## 2024-06-12 NOTE — TELEPHONE ENCOUNTER
----- Message from JACLYN Ospina sent at 6/12/2024  9:35 AM EDT -----  Please call pt to remind to do Cologuard.

## 2024-06-13 ENCOUNTER — PREP FOR PROCEDURE (OUTPATIENT)
Dept: BARIATRICS | Facility: CLINIC | Age: 64
End: 2024-06-13

## 2024-06-13 DIAGNOSIS — E11.9 DIABETES MELLITUS (HCC): ICD-10-CM

## 2024-06-13 DIAGNOSIS — R13.10 SWALLOWING DIFFICULTY: ICD-10-CM

## 2024-06-13 DIAGNOSIS — K29.70 GASTROESOPHAGITIS: ICD-10-CM

## 2024-06-13 DIAGNOSIS — K44.9 HIATAL HERNIA: Primary | ICD-10-CM

## 2024-06-13 DIAGNOSIS — K21.9 ESOPHAGEAL REFLUX: ICD-10-CM

## 2024-06-13 DIAGNOSIS — R11.10 REGURGITATION AND RECHEWING: ICD-10-CM

## 2024-06-13 DIAGNOSIS — K20.90 GASTROESOPHAGITIS: ICD-10-CM

## 2024-06-13 DIAGNOSIS — G47.33 OSA ON CPAP: ICD-10-CM

## 2024-06-14 ENCOUNTER — TELEPHONE (OUTPATIENT)
Dept: ENDOCRINOLOGY | Facility: CLINIC | Age: 64
End: 2024-06-14

## 2024-06-17 NOTE — TELEPHONE ENCOUNTER
Wandy calling back from [facility], she states the signatures are exactly the same and look like stamps. That's why they are questioning it because medicare wont accept that. She states she will put a note in that says they are manual signatures. She did send other forms over but you can disregard she will try yo submit the form previously sent.

## 2024-06-17 NOTE — TELEPHONE ENCOUNTER
Wandy from [facility] calling stating they need the fax forms dated and signed from provider. Medicare won't accept electronically signed. The paperwork needs to be manually signed and dated.

## 2024-06-17 NOTE — TELEPHONE ENCOUNTER
Reviewed forms that were manually signed and faxed previously. Left message for Wandy to call back and verify what the issue is.

## 2024-06-22 NOTE — TELEPHONE ENCOUNTER
Per 5/13/24 Pt Message, Dexcom not covered by insurance unless on insulin. Endo offered to send Rx for glucometer and supplies. Pt never responded.     Rx for Dexcom and supplies written by PCP.     I called pt. Pt states he'd like Endo to take over Dexcom and submit PA. Rx will need to entered under Endo for our PA team to complete.

## 2024-06-24 ENCOUNTER — TELEPHONE (OUTPATIENT)
Dept: ENDOCRINOLOGY | Facility: CLINIC | Age: 64
End: 2024-06-24

## 2024-06-25 ENCOUNTER — PATIENT OUTREACH (OUTPATIENT)
Dept: FAMILY MEDICINE CLINIC | Facility: HOSPITAL | Age: 64
End: 2024-06-25

## 2024-06-25 LAB — COLOGUARD RESULT REPORTABLE: NORMAL

## 2024-06-25 NOTE — PROGRESS NOTES
"Outpatient Care Management Note:    Care manager called Aristeo. He states that he is doing well. He is working with endocrine to get his dexcom approved. He states that his blood sugar currently is 170.  He has the endocrine office download and review his sugars every 2 weeks.     He is still not using his cpap, because his mask was leaking. They are sending him a new mask.     Aristeo states that he has ongoing issues with not being able to eat.  He has a \"sour stomach and gets pain with eating\" ever since having gastric bipass surgery. He is hoping that his linx procedure will solve some of his symptoms.     Aristeo is actively self managing his medical needs. CM will close his referral. He will call with any questions.   "

## 2024-07-01 NOTE — TELEPHONE ENCOUNTER
Called pharmacy to see what was needed to be done the transmitter and  and was informed no prior authorization needed

## 2024-07-01 NOTE — PROGRESS NOTES
Neurology Ambulatory Visit  Name: Aristeo Hall       : 1960       MRN: [id number]   Encounter Provider: Charlotte Cole MD   Encounter Date: 2024  Encounter department: [facility]    Assessment and Plan  1. Neuropathic pain  -     DULoxetine (Cymbalta) 30 mg delayed release capsule; Take 1 capsule (30 mg total) by mouth daily  -     Sjogren's Antibodies; Future  2. Diabetic neuropathy (HCC)  Assessment & Plan:    Lab Results   Component Value Date    HGBA1C 6.6 (H) 2024     On exam, he has components of both large and small fiber neuropathy.  The only clear underlying etiology is diabetes.  He does have dry mouth for which Sjogren's could be a possibility.  B12, B1, B6, Lyme, zinc and immunofixation were all unremarkable.  He has tried gabapentin up to 3200 mg daily without relief.  He tried Lyrica in the past which was also not helpful.  I am reluctant to try a tricyclic antidepressant because of borderline elevated QTc.  He has tried lidocaine ointment which caused more pain than improvement due to the pain from the actual application.  He has a spinal cord stimulator in place which has not been helpful.    Given the symmetric sensory loss without clear trauma to the legs, CRPS seems less likely.    Recommendations:  -Trial of duloxetine 30 mg daily  -Start alpha lipoic acid 300 mg twice daily  -Trial of Aspercreme with lidocaine spray  -Check SSA and SSB  -EMG in the future.  Importantly, I do not think able tolerated at this time.  3. Dry mouth  -     Sjogren's Antibodies; Future  4. Chronic daily headache  Assessment & Plan:  He has what sounds like tension type headaches.  He follows with Dr. Qiu.  Perhaps the duloxetine will also be helpful with this.  5. History of CVA (cerebrovascular accident)  Assessment & Plan:  He has a history of cerebellar and occipital strokes.  He does have a field cut.  He is on aspirin and Plavix.  6. Presence of  "intrathecal pump  Assessment & Plan:  He had previous injury to the femoral nerve in 2004.  He has a morphine pump and is followed by pain management.  7. CPAP (continuous positive airway pressure) dependence  Assessment & Plan:  He has ARLEEN and was recently started on CPAP.  8. Cerebral aneurysm  Assessment & Plan:  He had previous intervention in 2004 and is followed by neurosurgery.    He will Return in about 3 months (around 10/2/2024).    History of Present Illness     Mr Haile Calderon is a 63 yr old gentleman with PMH gastric sleeve surgery, bilateral occipital and left cerebellar lobe infarcts, right ACOM aneurysm, chronic migraine headaches, COPD< chronic pain disorder, CAD s/p stent, ARLEEN, esophageal dysphagia, GERD, hiatal hernia, Oam back pain s/p intrathecal pump, lumbosacral radiculopathy, DM, CKD who presents for neuromuscular evaluation of pain in the legs. Last seen by Dr Qiu last month. A 3rd year medical student accompanied me for this visit with the patient's consent.     Symptoms started several years ago after gastric sleeve surgery, which was done in 2019.  He first noted symptoms in his feet that gradually progressed up the legs.  He now has symptoms up to the knees.  His hands are weak.  He has had such significant pain that a spinal cord stimulator was also tried for the neuropathic pain, but was unsuccessful.  He has taken gabapentin up to 800 mg 4 times daily without relief.  In the past, he was on Lyrica which also did not help.  Most recently, Dr. Qiu tried him on topical analgesics, but he found rubbing the ointment on his legs cause more pain.  He feels like his feet are \"on fire.\"  However, they also feel like they are \"freezing.\"  He has to keep his shoes on most of the time.  If he tries to walk without shoes, he falls.  He has to have his feet elevated when sitting.  He has allodynia.  Dr. Qiu also tried him on a brief course of steroids which was not helpful.    He has " never had an EMG.    He denies any specific injury to the feet.    He has diabetes with most recent HbA1c 6.6.  He has a glucose monitor.    In 2004 he had a cerebral angiogram for aneurysm.  He tells us that the femoral nerve was injured at the time.  He has had pain in his leg and weakness of the proximal right leg ever since.  He has a morphine pump in place and follows closely with pain management for the last 20+ years.  He was initially wheelchair-bound but progressed to a rollator which she has been using for about 20 years.    Per Dr. Qiu's notes, he also has chronic daily headache described as a pressure-like pain in a band formation.  It can occur at anytime of the day and associated with photophobia and occasional blurry vision.  No nausea or vomiting.  Headaches worsen with sitting.  Per Dr. Qiu's notes, he can continue to follow-up with her for the headaches.    He has obstructive sleep apnea and was recently started on CPAP.  Unfortunately, he has multiple nighttime awakenings due to the pain, so he is taking the mask on and off.  Trazodone is listed amongst his medicines but he rarely takes it.    He has a history of chronic strokes and has difficulty with field cuts superiorly in both eyes.  He is able to drive.    He notes chronic memory issues since his aneurysm surgery.  He states he is on memantine although it is not listed amongst his medications.                    Review of Systems  Constitutional:  Positive for fatigue. Negative for appetite change and fever.   HENT: Negative.  Negative for hearing loss, tinnitus, trouble swallowing and voice change.    Eyes: Negative.  Negative for photophobia, pain and visual disturbance.   Respiratory: Negative.  Negative for shortness of breath.    Cardiovascular: Negative.  Negative for palpitations.   Gastrointestinal: Negative.  Negative for nausea and vomiting.   Endocrine: Negative.  Negative for cold intolerance.   Genitourinary: Negative.   Negative for dysuria, frequency and urgency.   Musculoskeletal:  Positive for gait problem (rolling walker) and myalgias (muscle spasms). Negative for back pain, neck pain and neck stiffness.   Skin: Negative.  Negative for rash.   Allergic/Immunologic: Negative.    Neurological:  Positive for tremors, weakness (b/l legs) and numbness (numbness.tingling burning pain b/l legs , R leg worse x years). Negative for dizziness, seizures, syncope, facial asymmetry, speech difficulty, light-headedness and headaches.   Hematological: Negative.  Does not bruise/bleed easily.   Psychiatric/Behavioral:  Positive for sleep disturbance (not sleeping well). Negative for confusion and hallucinations.            Objective     /78 (BP Location: Left arm, Patient Position: Sitting, Cuff Size: Adult)   Pulse 82   Temp 97.9 °F (36.6 °C) (Temporal)   Wt (!) 138 kg (304 lb)   SpO2 96%   BMI 40.11 kg/m²    Physical Exam  Constitutional:       Comments: Appears uncomfortable   HENT:      Mouth/Throat:      Mouth: Mucous membranes are moist.      Pharynx: Oropharynx is clear.   Eyes:      Conjunctiva/sclera: Conjunctivae normal.   Neck:      Vascular: No carotid bruit.   Cardiovascular:      Rate and Rhythm: Normal rate and regular rhythm.      Heart sounds: Normal heart sounds.   Pulmonary:      Effort: Pulmonary effort is normal.      Breath sounds: Normal breath sounds.   Skin:     General: Skin is warm.      Comments: Clammy skin  No color changes in the feet   Psychiatric:         Mood and Affect: Mood normal.         Behavior: Behavior normal.       Neurologic Exam  Mental status: Awake, alert, oriented to person, place and time.  Naming, knowledge, repetition, concentration intact.  Delayed recall 2/3 with prompts.    Cranial nerves: Extraocular movements intact.  Pupils equally round and reactive to light, 2 mm bilaterally.  Visual fields impaired superiorly bilaterally.  Facial sensation intact.  Face symmetric.  Speech  clear. Hearing intact.  Tongue, uvula, palate midline and intact.  Sternocleidomastoid intact.    Motor:   Deltoid: Right 4/5, left 4/5  Biceps: Right 4/5, left 4/5  Triceps: Right 4/5, left 4/5  : Right 4-/5, left 4-/5  Intrinsic hand muscles: Right 3/5, left 3/5  Significant giveaway weakness in the upper extremities    Iliopsoas: Right 2/5, left 4/5  Quadriceps: Right 5/5, left 5/5  Tibialis anterior: Right 5/5, left 5/5  Medial gastrocnemius: Right 5/5, left 5/5  Extensor hallucis longus: Right 5/5, left 5/5  Abductor hallucis: Right 3/5, left 2/5    Slightly high arches.    Cerebellar: No dysmetria.  He has difficulty with heel-to-shin on the right side due to the weakness.  He walks with a rollator.  He has difficulty arising from the chair without using his arms.    Deep tendon reflexes absent.    Sensory: Light touch intact.  Allodynia to the temperature of the tuning fork.  Vibration reduced to the ankles.  Pinprick reduced to the mid shin, as well as to above the wrist.  He has significant allodynia in both feet and distal legs.    DATA:  June 2024:  BRIAN no monoclonal bands  SPEP abnormal distribution in gamma  Lyme Ab negative  CRP 15.2  RADHA negative  Vit B6  18.8    April 2024:  HbA1c 6.6  Zinc 78  B1 135.4  Vit B12  989  Vit D 13  HbA1c 6.6            Administrative Statements   I have spent a total time of 45 minutes in caring for this patient on the day of the visit/encounter including Risks and benefits of tx options, Instructions for management, Patient and family education, Importance of tx compliance, Impressions, Counseling / Coordination of care, Documenting in the medical record, Reviewing / ordering tests, medicine, procedures  , and Obtaining or reviewing history  .

## 2024-07-02 ENCOUNTER — OFFICE VISIT (OUTPATIENT)
Dept: NEUROLOGY | Facility: CLINIC | Age: 64
End: 2024-07-02
Payer: COMMERCIAL

## 2024-07-02 VITALS
WEIGHT: 304 LBS | DIASTOLIC BLOOD PRESSURE: 78 MMHG | SYSTOLIC BLOOD PRESSURE: 124 MMHG | OXYGEN SATURATION: 96 % | TEMPERATURE: 97.9 F | HEART RATE: 82 BPM | BODY MASS INDEX: 40.11 KG/M2

## 2024-07-02 DIAGNOSIS — M79.2 NEUROPATHIC PAIN: Primary | ICD-10-CM

## 2024-07-02 DIAGNOSIS — Z86.73 HISTORY OF CVA (CEREBROVASCULAR ACCIDENT): ICD-10-CM

## 2024-07-02 DIAGNOSIS — Z99.89 CPAP (CONTINUOUS POSITIVE AIRWAY PRESSURE) DEPENDENCE: ICD-10-CM

## 2024-07-02 DIAGNOSIS — E11.40 DIABETIC NEUROPATHY (HCC): ICD-10-CM

## 2024-07-02 DIAGNOSIS — R51.9 CHRONIC DAILY HEADACHE: ICD-10-CM

## 2024-07-02 DIAGNOSIS — Z97.8 PRESENCE OF INTRATHECAL PUMP: ICD-10-CM

## 2024-07-02 DIAGNOSIS — R68.2 DRY MOUTH: ICD-10-CM

## 2024-07-02 DIAGNOSIS — I67.1 CEREBRAL ANEURYSM: ICD-10-CM

## 2024-07-02 PROBLEM — G90.529 CRPS (COMPLEX REGIONAL PAIN SYNDROME), LOWER LIMB: Status: RESOLVED | Noted: 2024-06-03 | Resolved: 2024-07-02

## 2024-07-02 PROCEDURE — 99215 OFFICE O/P EST HI 40 MIN: CPT | Performed by: PSYCHIATRY & NEUROLOGY

## 2024-07-02 RX ORDER — DULOXETIN HYDROCHLORIDE 30 MG/1
30 CAPSULE, DELAYED RELEASE ORAL DAILY
Qty: 30 CAPSULE | Refills: 3 | Status: SHIPPED | OUTPATIENT
Start: 2024-07-02

## 2024-07-02 NOTE — ASSESSMENT & PLAN NOTE
Lab Results   Component Value Date    HGBA1C 6.6 (H) 04/05/2024     On exam, he has components of both large and small fiber neuropathy.  The only clear underlying etiology is diabetes.  He does have dry mouth for which Sjogren's could be a possibility.  B12, B1, B6, Lyme, zinc and immunofixation were all unremarkable.  He has tried gabapentin up to 3200 mg daily without relief.  He tried Lyrica in the past which was also not helpful.  I am reluctant to try a tricyclic antidepressant because of borderline elevated QTc.  He has tried lidocaine ointment which caused more pain than improvement due to the pain from the actual application.  He has a spinal cord stimulator in place which has not been helpful.    Given the symmetric sensory loss without clear trauma to the legs, CRPS seems less likely.    Recommendations:  -Trial of duloxetine 30 mg daily  -Start alpha lipoic acid 300 mg twice daily  -Trial of Aspercreme with lidocaine spray  -Check SSA and SSB  -EMG in the future.  Importantly, I do not think able tolerated at this time.

## 2024-07-02 NOTE — ASSESSMENT & PLAN NOTE
He has what sounds like tension type headaches.  He follows with Dr. Qiu.  Perhaps the duloxetine will also be helpful with this.

## 2024-07-02 NOTE — PROGRESS NOTES
Patient ID: Aristeo Hall is a 63 y.o. male.    Assessment/Plan:    No problem-specific Assessment & Plan notes found for this encounter.       {Assess/PlanSmartLinks:63157}       Subjective:    HPI    {[facility] Neurology HPI texts:79516}    {Common ambulatory SmartLinks:60878}         Objective:    Blood pressure 124/78, pulse 82, temperature 97.9 °F (36.6 °C), temperature source Temporal, weight (!) 138 kg (304 lb), SpO2 96%.    Physical Exam    Neurological Exam      ROS:    Review of Systems   Constitutional:  Positive for fatigue. Negative for appetite change and fever.   HENT: Negative.  Negative for hearing loss, tinnitus, trouble swallowing and voice change.    Eyes: Negative.  Negative for photophobia, pain and visual disturbance.   Respiratory: Negative.  Negative for shortness of breath.    Cardiovascular: Negative.  Negative for palpitations.   Gastrointestinal: Negative.  Negative for nausea and vomiting.   Endocrine: Negative.  Negative for cold intolerance.   Genitourinary: Negative.  Negative for dysuria, frequency and urgency.   Musculoskeletal:  Positive for gait problem (rolling walker) and myalgias (muscle spasms). Negative for back pain, neck pain and neck stiffness.   Skin: Negative.  Negative for rash.   Allergic/Immunologic: Negative.    Neurological:  Positive for tremors, weakness (b/l legs) and numbness (numbness.tingling burning pain b/l legs , R leg worse x years). Negative for dizziness, seizures, syncope, facial asymmetry, speech difficulty, light-headedness and headaches.   Hematological: Negative.  Does not bruise/bleed easily.   Psychiatric/Behavioral:  Positive for sleep disturbance (not sleeping well). Negative for confusion and hallucinations.

## 2024-07-02 NOTE — ASSESSMENT & PLAN NOTE
He had previous injury to the femoral nerve in 2004.  He has a morphine pump and is followed by pain management.

## 2024-07-02 NOTE — PATIENT INSTRUCTIONS
Please try alpha lipoic acid 300 mg twice a day (available on amaz[employer])  Please try Aspercreme with lidocaine spray for your leg pain as needed 3-4 times per day. Avoid any open sores.

## 2024-07-02 NOTE — ASSESSMENT & PLAN NOTE
He has a history of cerebellar and occipital strokes.  He does have a field cut.  He is on aspirin and Plavix.

## 2024-07-04 DIAGNOSIS — J44.9 CHRONIC OBSTRUCTIVE PULMONARY DISEASE, UNSPECIFIED COPD TYPE (HCC): ICD-10-CM

## 2024-07-05 RX ORDER — ALBUTEROL SULFATE 2.5 MG/3ML
SOLUTION RESPIRATORY (INHALATION)
Qty: 720 ML | Refills: 5 | Status: SHIPPED | OUTPATIENT
Start: 2024-07-05

## 2024-07-06 DIAGNOSIS — E11.40 TYPE 2 DIABETES MELLITUS WITH DIABETIC NEUROPATHY, WITHOUT LONG-TERM CURRENT USE OF INSULIN (HCC): ICD-10-CM

## 2024-07-07 RX ORDER — PROCHLORPERAZINE 25 MG/1
SUPPOSITORY RECTAL
Qty: 1 EACH | Refills: 0 | Status: SHIPPED | OUTPATIENT
Start: 2024-07-07

## 2024-07-08 ENCOUNTER — ANESTHESIA EVENT (OUTPATIENT)
Dept: PERIOP | Facility: HOSPITAL | Age: 64
End: 2024-07-08
Payer: COMMERCIAL

## 2024-07-09 ENCOUNTER — OFFICE VISIT (OUTPATIENT)
Dept: FAMILY MEDICINE CLINIC | Facility: HOSPITAL | Age: 64
End: 2024-07-09
Payer: COMMERCIAL

## 2024-07-09 VITALS
SYSTOLIC BLOOD PRESSURE: 130 MMHG | HEART RATE: 78 BPM | TEMPERATURE: 98 F | WEIGHT: 302.6 LBS | DIASTOLIC BLOOD PRESSURE: 88 MMHG | HEIGHT: 73 IN | OXYGEN SATURATION: 96 % | BODY MASS INDEX: 40.11 KG/M2

## 2024-07-09 DIAGNOSIS — I25.118 CORONARY ARTERY DISEASE OF NATIVE ARTERY OF NATIVE HEART WITH STABLE ANGINA PECTORIS (HCC): ICD-10-CM

## 2024-07-09 DIAGNOSIS — J44.9 CHRONIC OBSTRUCTIVE PULMONARY DISEASE, UNSPECIFIED COPD TYPE (HCC): ICD-10-CM

## 2024-07-09 DIAGNOSIS — I50.32 CHRONIC DIASTOLIC CONGESTIVE HEART FAILURE (HCC): ICD-10-CM

## 2024-07-09 DIAGNOSIS — F33.1 MAJOR DEPRESSIVE DISORDER, RECURRENT, MODERATE (HCC): Chronic | ICD-10-CM

## 2024-07-09 DIAGNOSIS — Z12.11 COLON CANCER SCREENING: ICD-10-CM

## 2024-07-09 DIAGNOSIS — E78.00 PURE HYPERCHOLESTEROLEMIA: ICD-10-CM

## 2024-07-09 DIAGNOSIS — E11.40 TYPE 2 DIABETES MELLITUS WITH DIABETIC NEUROPATHY, WITHOUT LONG-TERM CURRENT USE OF INSULIN (HCC): Primary | ICD-10-CM

## 2024-07-09 DIAGNOSIS — N18.2 STAGE 2 CHRONIC KIDNEY DISEASE: ICD-10-CM

## 2024-07-09 DIAGNOSIS — E11.65 TYPE 2 DIABETES MELLITUS WITH HYPERGLYCEMIA, WITHOUT LONG-TERM CURRENT USE OF INSULIN (HCC): ICD-10-CM

## 2024-07-09 PROCEDURE — G2211 COMPLEX E/M VISIT ADD ON: HCPCS | Performed by: NURSE PRACTITIONER

## 2024-07-09 PROCEDURE — 99214 OFFICE O/P EST MOD 30 MIN: CPT | Performed by: NURSE PRACTITIONER

## 2024-07-09 NOTE — ASSESSMENT & PLAN NOTE
Lab Results   Component Value Date    HGBA1C 6.6 (H) 04/05/2024   Managed by prakash.   He is on jardiance.. he is interested in weight loss medication. Will defer to endo to discuss this.   Foot exam done today. Iris exam done in office.   Plan to f/u in 4 months.

## 2024-07-09 NOTE — ASSESSMENT & PLAN NOTE
Wt Readings from Last 3 Encounters:   07/09/24 (!) 137 kg (302 lb 9.6 oz)   07/02/24 (!) 138 kg (304 lb)   06/03/24 135 kg (298 lb)     Managed by cardiology.   Appears euvolemic today.

## 2024-07-09 NOTE — ASSESSMENT & PLAN NOTE
Lab Results   Component Value Date    EGFR 67 04/05/2024    EGFR 62 04/01/2024    EGFR 66 03/31/2024    CREATININE 1.16 04/05/2024    CREATININE 1.24 04/01/2024    CREATININE 1.18 03/31/2024   Stable. Continue to monitor.

## 2024-07-09 NOTE — ASSESSMENT & PLAN NOTE
Managed by cardiology.   Reports NTG use which is not new for him.   He reports compliance with medications.

## 2024-07-09 NOTE — PROGRESS NOTES
Ambulatory Visit  Name: Aristeo Hall      : 1960      MRN: [id number]  Encounter Provider: JACLYN Ospina  Encounter Date: 2024   Encounter department: [facility] [address]     Assessment & Plan   1. Type 2 diabetes mellitus with diabetic neuropathy, without long-term current use of insulin ([facility])  -     IRIS Diabetic eye exam  2. Chronic obstructive pulmonary disease, unspecified COPD type ([facility])  Assessment & Plan:  Managed by pulmonology.   Next scheduled in August.   Breathing has been worse with hot, humid weather.   3. Chronic diastolic congestive heart failure (HCC)  Assessment & Plan:  Wt Readings from Last 3 Encounters:   24 (!) 137 kg (302 lb 9.6 oz)   24 (!) 138 kg (304 lb)   24 135 kg (298 lb)     Managed by cardiology.   Appears euvolemic today.         4. Coronary artery disease of native artery of native heart with stable angina pectoris (HCC)  Assessment & Plan:  Managed by cardiology.   Reports NTG use which is not new for him.   He reports compliance with medications.   5. Stage 2 chronic kidney disease  Assessment & Plan:  Lab Results   Component Value Date    EGFR 67 2024    EGFR 62 2024    EGFR 66 2024    CREATININE 1.16 2024    CREATININE 1.24 2024    CREATININE 1.18 2024   Stable. Continue to monitor.   6. Major depressive disorder, recurrent, moderate (HCC)  Assessment & Plan:  Mood is not great secondary to chronic health conditions.   Continue on current regimen.   7. Pure hypercholesterolemia  Assessment & Plan:  LDL is well controlled.   On HD statin therapy.   8. Type 2 diabetes mellitus with hyperglycemia, without long-term current use of insulin ([facility])  Assessment & Plan:    Lab Results   Component Value Date    HGBA1C 6.6 (H) 2024   Managed by endo.   He is on jardiance.. he is interested in weight loss medication. Will defer to endo to discuss this.   Foot exam done today.  Iris exam done in office.   Plan to f/u in 4 months.   9. Colon cancer screening  -     Cologuard         History of Present Illness     Checks BP daily. Ranges 110-170.   Breathing is not great with the humidity. Had to change pulm appointment to August. Using albuterol nebulizer.   Mood is bad. Feels his health problems contribute to his depressed mood and inability to really do anything.   Minimal leg swelling. This fluctuates.   Following with endo. On jardiance. Bad neuropathy. Sees neuro. Meds are not working.   Will get chest pain when he gets upset. Takes nitro and it goes away.       Review of Systems   Constitutional:  Positive for fatigue. Negative for unexpected weight change.   Eyes:  Negative for visual disturbance.   Respiratory:  Positive for shortness of breath. Negative for cough and wheezing.    Cardiovascular:  Negative for chest pain, palpitations and leg swelling.   Musculoskeletal:  Positive for arthralgias. Negative for myalgias.   Neurological:  Positive for numbness. Negative for dizziness, weakness, light-headedness and headaches.     Past Medical History:   Diagnosis Date    Acute on chronic diastolic congestive heart failure (HCC)     Altered gait     Alzheimer disease (HCC)     per patients,,early onset     Angina pectoris (HCC)     Anxiety     Arthritis     Brain aneurysm     coils placed    Cardiac disease     Chest pain 01/13/2016    Chronic kidney disease     Chronic pain     back/ right groin and rle- has morphine pump    Constipation     COPD (chronic obstructive pulmonary disease) (HCC)     Coronary artery disease     CPAP (continuous positive airway pressure) dependence     Decubital ulcer     sacral decub-occured 5/2019-sees wound care/debide in OR today 6/6/2019    Dependent on walker for ambulation     w/c for long distance    Depression     Diabetes mellitus (HCC)     insulin dependent    Difficulty walking     Dizziness     occ    Dysphagia     Enlarged prostate      Esophageal stricture In file    Esophageal varices ([facility])     Fall     Fall at home 05/03/2019    GERD (gastroesophageal reflux disease)     Heart failure ([facility])     Hiatal hernia     Hx of gastric bypass 11/19/2018    Hypercholesterolemia     Hypertension     Ingrown toenail     MI (myocardial infarction) ([facility])     2017- stents x2    Migraine     Morbid obesity ([facility])     gastric bypass sleeve 11/2018-wt loss 125 lb    Myocardial infarction ([facility]) 2003    Neuropathy     Obesity 2003    Oxygen dependent     Q HS  2LPM with CPAP and prn during day 2-3 LPM     Pressure injury of skin     Pressure injury of skin of sacral region 06/03/2019    Added automatically from request for surgery [id number]    Renal disorder     Shortness of breath     Skin abnormality     sacral wound - covered with pad    Sleep apnea     Stented coronary artery     Stroke ([facility])     vision loss b/l  2005, residual R leg weakness    Type 2 diabetes mellitus with diabetic neuropathy, with long-term current use of insulin ([facility]) 10/18/2019    Type 2 diabetes mellitus with renal complication ([facility])     insulin dependent    Type 2 diabetes mellitus, with long-term current use of insulin ([facility]) 10/11/2017    Transitioned From: Diabetes mellitus type 2, uncontrolled    Urinary frequency     Use of cane as ambulatory aid     Wears dentures     Wears glasses     Wears glasses     Wheelchair dependent      Past Surgical History:   Procedure Laterality Date    BACK SURGERY      BRAIN SURGERY      CARDIAC CATHETERIZATION      with stents    CARDIAC CATHETERIZATION N/A 8/18/2023    Procedure: Cardiac Coronary Angiogram;  Surgeon: Horacio Weiner MD;  Location: [facility];  Service: Cardiology    CEREBRAL ANEURYSM REPAIR      with coils    COLONOSCOPY      ESOPHAGOGASTRODUODENOSCOPY N/A 7/1/2016    Procedure: ESOPHAGOGASTRODUODENOSCOPY (EGD);  Surgeon: Reno Christiansen MD;  Location: [facility];  Service:     GASTRIC BYPASS  11/19/2018    HERNIA REPAIR      HIATAL  HERNIA REPAIR      INFUSION PUMP IMPLANTATION Left     morphine    INTRATHECAL PUMP IMPLANTATION Left 7/9/2020    Procedure: REVISION INTRATHECAL PAIN PUMP POCKET, LEFT ABDOMEN;  Surgeon: Homero Cho MD;  Location: [facility];  Service: Neurosurgery    KNEE ARTHROSCOPY Right     KNEE ARTHROSCOPY Right     PERONEAL NERVE DECOMPRESSION Right     VA DEBRIDEMENT OPEN WOUND FIRST 20 SQ CM/< N/A 6/6/2019    Procedure: EXCISIONAL DEBRIDEMENT OF SACRAL DECUBITUS ULCER;  Surgeon: Siddhartha Omer MD;  Location: [facility];  Service: General    VA ESOPHAGOGASTRODUODENOSCOPY TRANSORAL DIAGNOSTIC N/A 2/27/2017    Procedure: ESOPHAGOGASTRODUODENOSCOPY (EGD);  Surgeon: Reno Christiansen MD;  Location: [facility];  Service: Gastroenterology    VA ESOPHAGOGASTRODUODENOSCOPY TRANSORAL DIAGNOSTIC N/A 8/23/2018    Procedure: ESOPHAGOGASTRODUODENOSCOPY (EGD) with biopsy;  Surgeon: Reno Christiansen MD;  Location: [facility];  Service: Gastroenterology    VA IMPLTJ/RPLCMT ITHCL/EDRL DRUG NFS PRGRBL PUMP Left 10/13/2020    Procedure: EXPLORATION OF INTRATHECAL PAIN PUMP SYSTEM INTEGRITY FOR POSSIBLE REPLACEMENT OF CATHETER AND PUMP.;  Surgeon: Homero Cho MD;  Location: [facility];  Service: Neurosurgery    VA IMPLTJ/RPLCMT ITHCL/EDRL DRUG NFS SUBQ RSVR N/A 1/19/2017    Procedure: REMOVAL / EXCHANGE INTRATHECAL PAIN PUMP;  Surgeon: Que Leonard MD;  Location: [facility];  Service: Orthopedics    VA IMPLTJ/RPLCMT ITHCL/EDRL DRUG NFS SUBQ RSVR N/A 5/16/2016    Procedure: REPLACEMENT AND PROGRAM PUMP ;  Surgeon: Que Leonard MD;  Location: [facility];  Service: Orthopedics    VA PRQ IMPLTJ NSTIM ELECTRODE ARRAY EPIDURAL Right 3/17/2021    Procedure: INSERTION THORACIC DORSAL COLUMN SPINAL CORD STIMULATOR PERCUTANEOUS W IMPLANTABLE PULSE GENERATOR, RIGHT;  Surgeon: Homero Cho MD;  Location: [facility];  Service: Neurosurgery     Family History   Problem Relation Age of Onset    Diabetes unspecified Mother     Diabetes Mother     Heart attack  Father     Heart disease Father     Hypertension Father     Stroke Father     Diabetes unspecified Brother     Depression Brother     Mental illness Brother     COPD Brother     Drug abuse Brother     Bipolar disorder Brother     Diabetes unspecified Brother     Diabetes unspecified Maternal Grandmother     Diabetes unspecified Paternal Grandmother     Diabetes unspecified Paternal Uncle     ADD / ADHD Cousin     Colon cancer Neg Hx     Colon polyps Neg Hx      Social History     Tobacco Use    Smoking status: Never    Smokeless tobacco: Never   Vaping Use    Vaping status: Never Used   Substance and Sexual Activity    Alcohol use: Not Currently    Drug use: Not Currently     Types: Marijuana     Comment: Medical card    Sexual activity: Not Currently     Partners: Female     Current Outpatient Medications on File Prior to Visit   Medication Sig    albuterol (2.5 mg/3 mL) 0.083 % nebulizer solution USE 1 VIAL IN NEBULIZER EVERY 6 HOURS AS NEEDED FOR WHEEZING OR SHORTNESS OF BREATH    amLODIPine (NORVASC) 5 mg tablet Take 1 tablet (5 mg total) by mouth daily    ammonium lactate (LAC-HYDRIN) 12 % cream Apply topically as needed for dry skin    aspirin 81 mg chewable tablet Chew 1 tablet (81 mg total) daily    atorvastatin (LIPITOR) 80 mg tablet Take 1 tablet (80 mg total) by mouth daily after dinner    baclofen 10 mg tablet Take 10 mg by mouth 3 (three) times a day    busPIRone (BUSPAR) 5 mg tablet Take 5 mg by mouth 2 (two) times a day    CALCIUM PO Take 1 tablet by mouth daily    carvedilol (COREG) 12.5 mg tablet TAKE 1 TABLET BY MOUTH TWICE A DAY WITH FOOD    clopidogrel (PLAVIX) 75 mg tablet Take 1 tablet (75 mg total) by mouth daily    Continuous Blood Gluc Sensor (Dexcom G6 Sensor) MISC 3 PACK SENSOR FOR CONTINUOUS GLUCOSE MONITORING    Continuous Blood Gluc Transmit (Dexcom G6 Transmitter) MISC 1 TRANSMITTED EVERY 3 MONTHS FOR CONTINUOUS GLUCOSE MONITORING    Continuous Glucose  (Dexcom G6 )  CANDACE USE AS DIRECTED    dexamethasone (DECADRON) 2 mg tablet Take 1 tablet (2 mg total) by mouth daily with breakfast    docusate sodium (COLACE) 100 mg capsule Take 1 capsule (100 mg total) by mouth 2 (two) times a day    DULoxetine (Cymbalta) 30 mg delayed release capsule Take 1 capsule (30 mg total) by mouth daily    Empagliflozin (JARDIANCE) 10 MG TABS tablet Take 1 tablet (10 mg total) by mouth every morning    ergocalciferol (VITAMIN D2) 50,000 units Take 1 capsule (50,000 Units total) by mouth once a week    Fluticasone-Salmeterol (Advair Diskus) 500-50 mcg/dose inhaler Inhale 1 puff 2 (two) times a day Rinse mouth after use    folic acid (FOLVITE) 1 mg tablet Take 1 tablet (1 mg total) by mouth daily    furosemide (LASIX) 40 mg tablet Take once in AM daily. Take once daily in PM PRN weight gain, edema.    gabapentin (NEURONTIN) 800 mg tablet Take 800 mg by mouth 4 (four) times a day    hydrocortisone 1 % lotion Apply topically if needed for rash    lidocaine (LIDODERM) 5 % Apply 1 patch topically daily back    losartan (COZAAR) 50 mg tablet TAKE 1 TABLET BY MOUTH EVERY DAY    methocarbamol (ROBAXIN) 750 mg tablet TAKE 1 TABLET (750 MG TOTAL) BY MOUTH EVERY 6 (SIX) HOURS AS NEEDED FOR MUSCLE SPASMS    naloxegol oxalate (MOVANTIK) 25 MG tablet Take 1 tablet (25 mg total) by mouth daily in the early morning    nitroglycerin (NITROSTAT) 0.4 mg SL tablet PLACE 1 TABLET (0.4 MG TOTAL) UNDER THE TONGUE EVERY 5 (FIVE) MINUTES AS NEEDED FOR CHEST PAIN (PT HAS NOT USED RECENTLY)    nystatin (MYCOSTATIN) cream Apply 2 g (1 Application total) topically 2 (two) times a day    omeprazole (PriLOSEC) 40 MG capsule Take 1 capsule (40 mg total) by mouth 2 (two) times a day before meals    ondansetron (ZOFRAN) 4 mg tablet Take 1 tablet (4 mg total) by mouth every 8 (eight) hours as needed for nausea or vomiting    potassium chloride (Klor-Con M20) 20 mEq tablet Take 1 tablet (20 mEq total) by mouth daily    ranolazine (RANEXA)  "1000 MG SR tablet TAKE 1 TABLET (1,000 MG TOTAL) BY MOUTH EVERY 12 HOURS    tamsulosin (FLOMAX) 0.4 mg TAKE 1 CAPSULE BY MOUTH EVERY DAY WITH DINNER    traZODone (DESYREL) 100 mg tablet Take 1 tablet (100 mg total) by mouth daily at bedtime    vitamin B-12 (VITAMIN B-12) 1,000 mcg tablet Take 1 tablet (1,000 mcg total) by mouth daily     No Known Allergies  Immunization History   Administered Date(s) Administered    COVID-19 MODERNA VACC 0.5 ML IM 03/02/2021, 03/30/2021    Fluzone Split Quad 0.5 mL 10/06/2016, 10/13/2017, 10/24/2018, 11/13/2019    INFLUENZA 11/26/2014, 10/06/2016, 10/06/2016, 10/06/2016, 10/12/2017, 10/12/2017, 03/03/2018, 10/24/2018, 10/24/2018, 10/24/2018, 10/26/2018, 11/13/2019, 10/01/2020, 10/01/2020, 10/20/2022, 10/20/2022    Influenza Quadrivalent Preservative Free 3 years and older IM 10/13/2017    Influenza, seasonal, injectable 11/26/2012, 11/26/2014    Pneumococcal Conjugate 13-Valent 10/13/2017    Pneumococcal Polysaccharide PPV23 04/18/2017    Tdap 08/02/2014, 02/15/2020, 01/21/2022, 01/21/2022     Objective     /88   Pulse 78   Temp 98 °F (36.7 °C)   Ht 6' 1\" (1.854 m)   Wt (!) 137 kg (302 lb 9.6 oz)   SpO2 96%   BMI 39.92 kg/m²     Physical Exam  Vitals reviewed.   Constitutional:       Appearance: Normal appearance. He is well-developed. He is obese.   Cardiovascular:      Rate and Rhythm: Normal rate and regular rhythm.      Pulses: no weak pulses.           Carotid pulses are 2+ on the right side and 2+ on the left side.       Dorsalis pedis pulses are 2+ on the right side and 2+ on the left side.        Posterior tibial pulses are 2+ on the right side and 2+ on the left side.      Heart sounds: Normal heart sounds. No murmur heard.  Pulmonary:      Effort: Pulmonary effort is normal.      Breath sounds: Normal breath sounds.   Feet:      Right foot:      Skin integrity: No ulcer, skin breakdown, erythema, warmth, callus or dry skin.      Left foot:      Skin integrity: " No ulcer, skin breakdown, erythema, warmth, callus or dry skin.   Skin:     General: Skin is warm and dry.   Neurological:      Mental Status: He is alert and oriented to person, place, and time.   Psychiatric:         Mood and Affect: Mood normal.         Behavior: Behavior normal.         Thought Content: Thought content normal.         Judgment: Judgment normal.     Patient's shoes and socks removed.    Right Foot/Ankle   Right Foot Inspection  Skin Exam: skin normal and skin intact. No dry skin, no warmth, no callus, no erythema, no maceration, no abnormal color, no pre-ulcer, no ulcer and no callus.     Toe Exam: ROM and strength within normal limits.     Sensory   Vibration: diminished  Monofilament testing: absent    Vascular  Capillary refills: < 3 seconds  The right DP pulse is 2+. The right PT pulse is 2+.     Left Foot/Ankle  Left Foot Inspection  Skin Exam: skin normal and skin intact. No dry skin, no warmth, no erythema, no maceration, normal color, no pre-ulcer, no ulcer and no callus.     Toe Exam: ROM and strength within normal limits.     Sensory   Vibration: diminished  Monofilament testing: absent    Vascular  Capillary refills: < 3 seconds  The left DP pulse is 2+. The left PT pulse is 2+.     Assign Risk Category  No deformity present  No loss of protective sensation  No weak pulses  Risk: 2

## 2024-07-09 NOTE — ASSESSMENT & PLAN NOTE
Managed by pulmonology.   Next scheduled in August.   Breathing has been worse with hot, humid weather.

## 2024-07-11 ENCOUNTER — OFFICE VISIT (OUTPATIENT)
Dept: BARIATRICS | Facility: CLINIC | Age: 64
End: 2024-07-11
Payer: COMMERCIAL

## 2024-07-11 VITALS
SYSTOLIC BLOOD PRESSURE: 146 MMHG | WEIGHT: 296.5 LBS | BODY MASS INDEX: 38.05 KG/M2 | HEART RATE: 117 BPM | OXYGEN SATURATION: 99 % | TEMPERATURE: 95.4 F | HEIGHT: 74 IN | DIASTOLIC BLOOD PRESSURE: 88 MMHG

## 2024-07-11 DIAGNOSIS — R13.10 DYSPHAGIA: ICD-10-CM

## 2024-07-11 DIAGNOSIS — K21.9 GASTROESOPHAGEAL REFLUX DISEASE WITHOUT ESOPHAGITIS: Primary | ICD-10-CM

## 2024-07-11 PROCEDURE — 99213 OFFICE O/P EST LOW 20 MIN: CPT | Performed by: SURGERY

## 2024-07-11 RX ORDER — CELECOXIB 200 MG/1
200 CAPSULE ORAL ONCE
OUTPATIENT
Start: 2024-07-11 | End: 2024-07-11

## 2024-07-11 RX ORDER — ENOXAPARIN SODIUM 100 MG/ML
40 INJECTION SUBCUTANEOUS ONCE
OUTPATIENT
Start: 2024-07-11 | End: 2024-07-11

## 2024-07-11 RX ORDER — ACETAMINOPHEN 10 MG/ML
1000 INJECTION, SOLUTION INTRAVENOUS ONCE
OUTPATIENT
Start: 2024-07-11 | End: 2024-07-11

## 2024-07-11 RX ORDER — CEFAZOLIN SODIUM 2 G/50ML
2000 SOLUTION INTRAVENOUS ONCE
OUTPATIENT
Start: 2024-07-11 | End: 2024-07-11

## 2024-07-11 NOTE — H&P
BARIATRIC HISTORY AND PHYSICAL - BARIATRIC SURGERY  Aristeo Hall 63 y.o. male MRN: [id number]  Unit/Bed#:  Encounter: [id number]      HPI:  Aristeo Hall is a 63 y.o. male who presents to review their preoperative workup and see if they are a good candidate to undergo a bariatric/forgut procedure.     Hiatal Hernia? None on EGD or upper GI  Prior bariatric surgery? Sleeve gastrectomy 2019 [facility]    Review of Systems   Constitutional:  Negative for chills and fever.   HENT:  Negative for ear pain and sore throat.    Eyes:  Negative for pain and visual disturbance.   Respiratory:  Negative for cough and shortness of breath.    Cardiovascular:  Negative for chest pain and palpitations.   Gastrointestinal:  Negative for abdominal pain and vomiting.        Reflux and regurgitation.   Genitourinary:  Negative for dysuria and hematuria.   Musculoskeletal:  Negative for arthralgias and back pain.   Skin:  Negative for color change and rash.   Neurological:  Negative for seizures and syncope.   All other systems reviewed and are negative.      Historical Information   Past Medical History:   Diagnosis Date    Acute on chronic diastolic congestive heart failure (HCC)     Altered gait     Alzheimer disease (HCC)     per patients,,early onset     Angina pectoris (HCC)     Anxiety     Arthritis     Brain aneurysm     coils placed    Cardiac disease     Chest pain 01/13/2016    Chronic kidney disease     Chronic pain     back/ right groin and rle- has morphine pump    Constipation     COPD (chronic obstructive pulmonary disease) (HCC)     Coronary artery disease     CPAP (continuous positive airway pressure) dependence     Decubital ulcer     sacral decub-occured 5/2019-sees wound care/debide in OR today 6/6/2019    Dependent on walker for ambulation     w/c for long distance    Depression     Diabetes mellitus (HCC)     insulin dependent    Difficulty walking     Dizziness     occ    Dysphagia     Enlarged  prostate     Esophageal stricture In file    Esophageal varices ([facility])     Fall     Fall at home 05/03/2019    GERD (gastroesophageal reflux disease)     Heart failure ([facility])     Hiatal hernia     Hx of gastric bypass 11/19/2018    Hypercholesterolemia     Hypertension     Ingrown toenail     MI (myocardial infarction) ([facility])     2017- stents x2    Migraine     Morbid obesity ([facility])     gastric bypass sleeve 11/2018-wt loss 125 lb    Myocardial infarction ([facility]) 2003    Neuropathy     Obesity 2003    Oxygen dependent     Q HS  2LPM with CPAP and prn during day 2-3 LPM     Pressure injury of skin     Pressure injury of skin of sacral region 06/03/2019    Added automatically from request for surgery [id number]    Renal disorder     Shortness of breath     Skin abnormality     sacral wound - covered with pad    Sleep apnea     Stented coronary artery     Stroke ([facility])     vision loss b/l  2005, residual R leg weakness    Type 2 diabetes mellitus with diabetic neuropathy, with long-term current use of insulin ([facility]) 10/18/2019    Type 2 diabetes mellitus with renal complication ([facility])     insulin dependent    Type 2 diabetes mellitus, with long-term current use of insulin ([facility]) 10/11/2017    Transitioned From: Diabetes mellitus type 2, uncontrolled    Urinary frequency     Use of cane as ambulatory aid     Wears dentures     Wears glasses     Wears glasses     Wheelchair dependent      Past Surgical History:   Procedure Laterality Date    BACK SURGERY      BRAIN SURGERY      CARDIAC CATHETERIZATION      with stents    CARDIAC CATHETERIZATION N/A 8/18/2023    Procedure: Cardiac Coronary Angiogram;  Surgeon: Horacio Weiner MD;  [facility];  Service: Cardiology    CEREBRAL ANEURYSM REPAIR      with coils    COLONOSCOPY      ESOPHAGOGASTRODUODENOSCOPY N/A 7/1/2016    Procedure: ESOPHAGOGASTRODUODENOSCOPY (EGD);  Surgeon: Reno Christiansen MD;  [facility];  Service:     GASTRIC BYPASS  11/19/2018    HERNIA REPAIR       HIATAL HERNIA REPAIR      INFUSION PUMP IMPLANTATION Left     morphine    INTRATHECAL PUMP IMPLANTATION Left 7/9/2020    Procedure: REVISION INTRATHECAL PAIN PUMP POCKET, LEFT ABDOMEN;  Surgeon: Homero Cho MD;  [facility];  Service: Neurosurgery    KNEE ARTHROSCOPY Right     KNEE ARTHROSCOPY Right     PERONEAL NERVE DECOMPRESSION Right     ND DEBRIDEMENT OPEN WOUND FIRST 20 SQ CM/< N/A 6/6/2019    Procedure: EXCISIONAL DEBRIDEMENT OF SACRAL DECUBITUS ULCER;  Surgeon: Siddhartha Omer MD;  [facility];  Service: General    ND ESOPHAGOGASTRODUODENOSCOPY TRANSORAL DIAGNOSTIC N/A 2/27/2017    Procedure: ESOPHAGOGASTRODUODENOSCOPY (EGD);  Surgeon: Reno Christiansen MD;  [facility];  Service: Gastroenterology    ND ESOPHAGOGASTRODUODENOSCOPY TRANSORAL DIAGNOSTIC N/A 8/23/2018    Procedure: ESOPHAGOGASTRODUODENOSCOPY (EGD) with biopsy;  Surgeon: Reno Christiansen MD;  [facility];  Service: Gastroenterology    ND IMPLTJ/RPLCMT ITHCL/EDRL DRUG NFS PRGRBL PUMP Left 10/13/2020    Procedure: EXPLORATION OF INTRATHECAL PAIN PUMP SYSTEM INTEGRITY FOR POSSIBLE REPLACEMENT OF CATHETER AND PUMP.;  Surgeon: Homero Cho MD;  [facility];  Service: Neurosurgery    ND IMPLTJ/RPLCMT ITHCL/EDRL DRUG NFS SUBQ RSVR N/A 1/19/2017    Procedure: REMOVAL / EXCHANGE INTRATHECAL PAIN PUMP;  Surgeon: Que Leonard MD;  [facility];  Service: Orthopedics    ND IMPLTJ/RPLCMT ITHCL/EDRL DRUG NFS SUBQ RSVR N/A 5/16/2016    Procedure: REPLACEMENT AND PROGRAM PUMP ;  Surgeon: Que Leonard MD;  [facility];  Service: Orthopedics    ND PRQ IMPLTJ NSTIM ELECTRODE ARRAY EPIDURAL Right 3/17/2021    Procedure: INSERTION THORACIC DORSAL COLUMN SPINAL CORD STIMULATOR PERCUTANEOUS W IMPLANTABLE PULSE GENERATOR, RIGHT;  Surgeon: Homero Cho MD;  [facility];  Service: Neurosurgery     Social History   Social History     Substance and Sexual Activity   Alcohol Use Not Currently     Social  "History     Substance and Sexual Activity   Drug Use Not Currently    Types: Marijuana    Comment: Medical card     Social History     Tobacco Use   Smoking Status Never   Smokeless Tobacco Never     Family History: non-contributory    Meds/Allergies   all medications and allergies reviewed  No Known Allergies    Objective     Current Vitals:   Blood Pressure: 146/88 (07/11/24 1438)  Pulse: (!) 117 (07/11/24 1438)  Temperature: (!) 95.4 °F (35.2 °C) (07/11/24 1438)  Temp Source: Tympanic (07/11/24 1438)  Height: 6' 1.5\" (186.7 cm) (07/11/24 1438)  Weight - Scale: 134 kg (296 lb 8 oz) (07/11/24 1438)  SpO2: 99 % (07/11/24 1438)  bowel movement  [unfilled]    Invasive Devices       Line  Duration             Pump Device Pain pump Left Abdomen -- days                    Physical Exam  Vitals reviewed.   Constitutional:       General: He is not in acute distress.     Appearance: Normal appearance. He is obese. He is not ill-appearing.      Comments: Ambulates with walker.   HENT:      Head: Normocephalic and atraumatic.      Nose: Nose normal.      Mouth/Throat:      Mouth: Mucous membranes are moist.      Pharynx: Oropharynx is clear.   Eyes:      General: No scleral icterus.  Cardiovascular:      Rate and Rhythm: Normal rate and regular rhythm.   Pulmonary:      Effort: Pulmonary effort is normal. No respiratory distress.      Breath sounds: Normal breath sounds.      Comments: Mild dyspnea.  Abdominal:      General: There is no distension.      Palpations: Abdomen is soft. There is no mass.      Tenderness: There is no abdominal tenderness.      Hernia: No hernia is present.   Musculoskeletal:         General: Normal range of motion.   Skin:     General: Skin is warm.      Capillary Refill: Capillary refill takes less than 2 seconds.   Neurological:      General: No focal deficit present.      Mental Status: He is alert. Mental status is at baseline.   Psychiatric:         Mood and Affect: Mood normal.         " "Behavior: Behavior normal.         Lab Results: I have personally reviewed pertinent lab results.  , CBC: No results found for: \"WBC\", \"HGB\", \"HCT\", \"MCV\", \"PLT\", \"ADJUSTEDWBC\", \"RBC\", \"MCH\", \"MCHC\", \"RDW\", \"MPV\", \"NRBC\", CMP: No results found for: \"SODIUM\", \"K\", \"CL\", \"CO2\", \"ANIONGAP\", \"BUN\", \"CREATININE\", \"GLUCOSE\", \"CALCIUM\", \"AST\", \"ALT\", \"ALKPHOS\", \"PROT\", \"BILITOT\", \"EGFR\"  Imaging: I have personally reviewed pertinent reports.    EKG, Pathology, and Other Studies: I have personally reviewed pertinent reports.      [facility]  Advance Directive and Living Will: Yes    Power of :    POLST:        Assessment/Plan:    The patient presented to review the preoperative workup and see if bariatric surgery is appropriate and indicated following the extensive preoperative workup and the enrollment in our weight loss program.  Preoperative workup was complete. Results were reviewed with the patient including the blood work results and the endoscopy findings and the biopsy results. We also reviewed the cardiology evaluation.    The patient was determined to be a good candidate for robotic assisted paraesophageal hernia repair with or without mesh, possible magnetic sphincter augmentation device placement (LINX).  ----------------------------------------------------------------------  Smoking: no  Blood thinner use: Plavix  Home pain medication use and who manages it: pain pump  CPAP use: no more (If yes, sleep study obtained and reminded pt to bring CPAP to hospital)  History of blood clots: no  Previous abdominal surgeries: sleeve  Cardiac clearance obtained: yes 5/24/2024 Dr Taylor  EKG performed: ECHO 3/29/2024 RV dilation  EGD prior to surgery: yes  Screening Labs obtained (CBC, CMP): yes  H. Pylori status: n/a  Medication allergies: NKDA  SLIM consult Post-Op: yes  Reminded patient about 2 week pre-op liquid diet: yes  10% body weight loss pre-operatively met/discussed: yes  Consent " signed: yes  Pre-Op ERAS Orders placed: yes  -----------------------------------------------------------------------  Risks and benefits explained one more time to the patient. Alternatives to surgery and alternative forms of surgery were also explained. Post-surgical commitment and after care programs were explained.  Consent was signed. Questions were answered and concerns were addressed. Patient will need to start the 2 week liquid diet prior to surgery.  Patient wishes to proceed. As per [facility] guidelines, I had a discussion with the patient regarding their CODE STATUS in the perioperative period and the patient is level 1 or FULL CODE STATUS.    Yunier Urban MD  Bariatric Surgery   7/11/2024  2:46 PM

## 2024-07-11 NOTE — H&P (VIEW-ONLY)
BARIATRIC HISTORY AND PHYSICAL - BARIATRIC SURGERY  Aristeo Hall 63 y.o. male MRN: [id number]  Unit/Bed#:  Encounter: [id number]      HPI:  Aristeo Hall is a 63 y.o. male who presents to review their preoperative workup and see if they are a good candidate to undergo a bariatric/forgut procedure.     Hiatal Hernia? None on EGD or upper GI  Prior bariatric surgery? Sleeve gastrectomy 2019 [facility]    Review of Systems   Constitutional:  Negative for chills and fever.   HENT:  Negative for ear pain and sore throat.    Eyes:  Negative for pain and visual disturbance.   Respiratory:  Negative for cough and shortness of breath.    Cardiovascular:  Negative for chest pain and palpitations.   Gastrointestinal:  Negative for abdominal pain and vomiting.        Reflux and regurgitation.   Genitourinary:  Negative for dysuria and hematuria.   Musculoskeletal:  Negative for arthralgias and back pain.   Skin:  Negative for color change and rash.   Neurological:  Negative for seizures and syncope.   All other systems reviewed and are negative.      Historical Information   Past Medical History:   Diagnosis Date    Acute on chronic diastolic congestive heart failure (HCC)     Altered gait     Alzheimer disease (HCC)     per patients,,early onset     Angina pectoris (HCC)     Anxiety     Arthritis     Brain aneurysm     coils placed    Cardiac disease     Chest pain 01/13/2016    Chronic kidney disease     Chronic pain     back/ right groin and rle- has morphine pump    Constipation     COPD (chronic obstructive pulmonary disease) (HCC)     Coronary artery disease     CPAP (continuous positive airway pressure) dependence     Decubital ulcer     sacral decub-occured 5/2019-sees wound care/debide in OR today 6/6/2019    Dependent on walker for ambulation     w/c for long distance    Depression     Diabetes mellitus (HCC)     insulin dependent    Difficulty walking     Dizziness     occ    Dysphagia     Enlarged  prostate     Esophageal stricture In file    Esophageal varices ([facility])     Fall     Fall at home 05/03/2019    GERD (gastroesophageal reflux disease)     Heart failure ([facility])     Hiatal hernia     Hx of gastric bypass 11/19/2018    Hypercholesterolemia     Hypertension     Ingrown toenail     MI (myocardial infarction) ([facility])     2017- stents x2    Migraine     Morbid obesity ([facility])     gastric bypass sleeve 11/2018-wt loss 125 lb    Myocardial infarction ([facility]) 2003    Neuropathy     Obesity 2003    Oxygen dependent     Q HS  2LPM with CPAP and prn during day 2-3 LPM     Pressure injury of skin     Pressure injury of skin of sacral region 06/03/2019    Added automatically from request for surgery [id number]    Renal disorder     Shortness of breath     Skin abnormality     sacral wound - covered with pad    Sleep apnea     Stented coronary artery     Stroke ([facility])     vision loss b/l  2005, residual R leg weakness    Type 2 diabetes mellitus with diabetic neuropathy, with long-term current use of insulin ([facility]) 10/18/2019    Type 2 diabetes mellitus with renal complication ([facility])     insulin dependent    Type 2 diabetes mellitus, with long-term current use of insulin ([facility]) 10/11/2017    Transitioned From: Diabetes mellitus type 2, uncontrolled    Urinary frequency     Use of cane as ambulatory aid     Wears dentures     Wears glasses     Wears glasses     Wheelchair dependent      Past Surgical History:   Procedure Laterality Date    BACK SURGERY      BRAIN SURGERY      CARDIAC CATHETERIZATION      with stents    CARDIAC CATHETERIZATION N/A 8/18/2023    Procedure: Cardiac Coronary Angiogram;  Surgeon: Horacio Weiner MD;  [facility];  Service: Cardiology    CEREBRAL ANEURYSM REPAIR      with coils    COLONOSCOPY      ESOPHAGOGASTRODUODENOSCOPY N/A 7/1/2016    Procedure: ESOPHAGOGASTRODUODENOSCOPY (EGD);  Surgeon: Reno Christiansen MD;  [facility];  Service:     GASTRIC BYPASS  11/19/2018    HERNIA REPAIR       HIATAL HERNIA REPAIR      INFUSION PUMP IMPLANTATION Left     morphine    INTRATHECAL PUMP IMPLANTATION Left 7/9/2020    Procedure: REVISION INTRATHECAL PAIN PUMP POCKET, LEFT ABDOMEN;  Surgeon: Homero Cho MD;  [facility];  Service: Neurosurgery    KNEE ARTHROSCOPY Right     KNEE ARTHROSCOPY Right     PERONEAL NERVE DECOMPRESSION Right     CA DEBRIDEMENT OPEN WOUND FIRST 20 SQ CM/< N/A 6/6/2019    Procedure: EXCISIONAL DEBRIDEMENT OF SACRAL DECUBITUS ULCER;  Surgeon: Siddhartha Omer MD;  [facility];  Service: General    CA ESOPHAGOGASTRODUODENOSCOPY TRANSORAL DIAGNOSTIC N/A 2/27/2017    Procedure: ESOPHAGOGASTRODUODENOSCOPY (EGD);  Surgeon: Reno Christiansen MD;  [facility];  Service: Gastroenterology    CA ESOPHAGOGASTRODUODENOSCOPY TRANSORAL DIAGNOSTIC N/A 8/23/2018    Procedure: ESOPHAGOGASTRODUODENOSCOPY (EGD) with biopsy;  Surgeon: Reno Christiansen MD;  [facility];  Service: Gastroenterology    CA IMPLTJ/RPLCMT ITHCL/EDRL DRUG NFS PRGRBL PUMP Left 10/13/2020    Procedure: EXPLORATION OF INTRATHECAL PAIN PUMP SYSTEM INTEGRITY FOR POSSIBLE REPLACEMENT OF CATHETER AND PUMP.;  Surgeon: Homero Cho MD;  [facility];  Service: Neurosurgery    CA IMPLTJ/RPLCMT ITHCL/EDRL DRUG NFS SUBQ RSVR N/A 1/19/2017    Procedure: REMOVAL / EXCHANGE INTRATHECAL PAIN PUMP;  Surgeon: Que Leonard MD;  [facility];  Service: Orthopedics    CA IMPLTJ/RPLCMT ITHCL/EDRL DRUG NFS SUBQ RSVR N/A 5/16/2016    Procedure: REPLACEMENT AND PROGRAM PUMP ;  Surgeon: Que Leonard MD;  [facility];  Service: Orthopedics    CA PRQ IMPLTJ NSTIM ELECTRODE ARRAY EPIDURAL Right 3/17/2021    Procedure: INSERTION THORACIC DORSAL COLUMN SPINAL CORD STIMULATOR PERCUTANEOUS W IMPLANTABLE PULSE GENERATOR, RIGHT;  Surgeon: Homero Cho MD;  [facility];  Service: Neurosurgery     Social History   Social History     Substance and Sexual Activity   Alcohol Use Not Currently     Social  "History     Substance and Sexual Activity   Drug Use Not Currently    Types: Marijuana    Comment: Medical card     Social History     Tobacco Use   Smoking Status Never   Smokeless Tobacco Never     Family History: non-contributory    Meds/Allergies   all medications and allergies reviewed  No Known Allergies    Objective     Current Vitals:   Blood Pressure: 146/88 (07/11/24 1438)  Pulse: (!) 117 (07/11/24 1438)  Temperature: (!) 95.4 °F (35.2 °C) (07/11/24 1438)  Temp Source: Tympanic (07/11/24 1438)  Height: 6' 1.5\" (186.7 cm) (07/11/24 1438)  Weight - Scale: 134 kg (296 lb 8 oz) (07/11/24 1438)  SpO2: 99 % (07/11/24 1438)  bowel movement  [unfilled]    Invasive Devices       Line  Duration             Pump Device Pain pump Left Abdomen -- days                    Physical Exam  Vitals reviewed.   Constitutional:       General: He is not in acute distress.     Appearance: Normal appearance. He is obese. He is not ill-appearing.      Comments: Ambulates with walker.   HENT:      Head: Normocephalic and atraumatic.      Nose: Nose normal.      Mouth/Throat:      Mouth: Mucous membranes are moist.      Pharynx: Oropharynx is clear.   Eyes:      General: No scleral icterus.  Cardiovascular:      Rate and Rhythm: Normal rate and regular rhythm.   Pulmonary:      Effort: Pulmonary effort is normal. No respiratory distress.      Breath sounds: Normal breath sounds.      Comments: Mild dyspnea.  Abdominal:      General: There is no distension.      Palpations: Abdomen is soft. There is no mass.      Tenderness: There is no abdominal tenderness.      Hernia: No hernia is present.   Musculoskeletal:         General: Normal range of motion.   Skin:     General: Skin is warm.      Capillary Refill: Capillary refill takes less than 2 seconds.   Neurological:      General: No focal deficit present.      Mental Status: He is alert. Mental status is at baseline.   Psychiatric:         Mood and Affect: Mood normal.         " "Behavior: Behavior normal.         Lab Results: I have personally reviewed pertinent lab results.  , CBC: No results found for: \"WBC\", \"HGB\", \"HCT\", \"MCV\", \"PLT\", \"ADJUSTEDWBC\", \"RBC\", \"MCH\", \"MCHC\", \"RDW\", \"MPV\", \"NRBC\", CMP: No results found for: \"SODIUM\", \"K\", \"CL\", \"CO2\", \"ANIONGAP\", \"BUN\", \"CREATININE\", \"GLUCOSE\", \"CALCIUM\", \"AST\", \"ALT\", \"ALKPHOS\", \"PROT\", \"BILITOT\", \"EGFR\"  Imaging: I have personally reviewed pertinent reports.    EKG, Pathology, and Other Studies: I have personally reviewed pertinent reports.      [facility]  Advance Directive and Living Will: Yes    Power of :    POLST:        Assessment/Plan:    The patient presented to review the preoperative workup and see if bariatric surgery is appropriate and indicated following the extensive preoperative workup and the enrollment in our weight loss program.  Preoperative workup was complete. Results were reviewed with the patient including the blood work results and the endoscopy findings and the biopsy results. We also reviewed the cardiology evaluation.    The patient was determined to be a good candidate for robotic assisted paraesophageal hernia repair with or without mesh, possible magnetic sphincter augmentation device placement (LINX).  ----------------------------------------------------------------------  Smoking: no  Blood thinner use: Plavix  Home pain medication use and who manages it: pain pump  CPAP use: no more (If yes, sleep study obtained and reminded pt to bring CPAP to hospital)  History of blood clots: no  Previous abdominal surgeries: sleeve  Cardiac clearance obtained: yes 5/24/2024 Dr Taylor  EKG performed: ECHO 3/29/2024 RV dilation  EGD prior to surgery: yes  Screening Labs obtained (CBC, CMP): yes  H. Pylori status: n/a  Medication allergies: NKDA  SLIM consult Post-Op: yes  Reminded patient about 2 week pre-op liquid diet: yes  10% body weight loss pre-operatively met/discussed: yes  Consent " signed: yes  Pre-Op ERAS Orders placed: yes  -----------------------------------------------------------------------  Risks and benefits explained one more time to the patient. Alternatives to surgery and alternative forms of surgery were also explained. Post-surgical commitment and after care programs were explained.  Consent was signed. Questions were answered and concerns were addressed. Patient will need to start the 2 week liquid diet prior to surgery.  Patient wishes to proceed. As per [facility] guidelines, I had a discussion with the patient regarding their CODE STATUS in the perioperative period and the patient is level 1 or FULL CODE STATUS.    Yunier Urban MD  Bariatric Surgery   7/11/2024  2:46 PM

## 2024-07-12 ENCOUNTER — CLINICAL SUPPORT (OUTPATIENT)
Dept: BARIATRICS | Facility: CLINIC | Age: 64
End: 2024-07-12

## 2024-07-12 VITALS — HEIGHT: 74 IN | BODY MASS INDEX: 38.05 KG/M2 | WEIGHT: 296.5 LBS

## 2024-07-12 DIAGNOSIS — R13.10 DYSPHAGIA: ICD-10-CM

## 2024-07-12 DIAGNOSIS — K21.9 GERD (GASTROESOPHAGEAL REFLUX DISEASE): ICD-10-CM

## 2024-07-12 DIAGNOSIS — Z98.84 BARIATRIC SURGERY STATUS: Primary | ICD-10-CM

## 2024-07-12 PROCEDURE — RECHECK: Performed by: DIETITIAN, REGISTERED

## 2024-07-12 RX ORDER — OMEPRAZOLE 20 MG/1
20 CAPSULE, DELAYED RELEASE ORAL DAILY
Qty: 90 CAPSULE | Refills: 1 | Status: SHIPPED | OUTPATIENT
Start: 2024-07-24

## 2024-07-12 NOTE — TELEPHONE ENCOUNTER
PO medications for laparoscopic paraesophageal hernia repair and laparoscopic magnetic sphincter augmentation LINX device surgery on 7/23/2024 with Dr Brad Mauro.      ** Pt declined opioids - has pain medication pump **

## 2024-07-12 NOTE — PROGRESS NOTES
"Bariatric Follow Up Nutrition Note     Patient's name and  were verified during visit. Pt present in a state that I hold active licensure.   Provider explained that [employer] is a HIPPA compliant platform and to further protect their confidentiality the provider was alone and the office door was closed. Patient consented to the virtual video visit Patient consented to the [employer] visit.  This visit is free.     Type of surgery  Vertical sleeve gastrectomy  Surgery Date: [facility]   5 years  post-op  Pre Oo Surgery : robotic assisted paraesophageal hernia repair with or without mesh, possible magnetic sphincter augmentation device placement (LINX).   Surgeon: Dr. Mansi Mauro    Nutrition Assessment   Aristeo ATWOOD Rafael  63 y.o.  male  Ht 6' 1.5\" (1.867 m)   Wt 134 kg (296 lb 8 oz)   BMI 38.59 kg/m²     Vipul Dempsey Equation:  2212  Estimated calories for weight maintenance:  2654  ( sedentary  )   Estimated calories for weight loss 3213-1304 ( 1-2# per wk wt loss - sedentary )  Estimated protein needs  grams per day (1.0-1.2gms/kg IBW )  Estimated fluid needs  oz per day (30-35 ml/kg IBW )      Weight on Day of Weight Loss Surgery: 375  Weight in (lb) to have BMI = 25: 192.7  Pre-Op Excess Wt: 182.3  Post-Op Wt Loss: 78.5#/ 43% EBWL/ 21 % TBWL  in 5 year(s)  James wt = 220 lbs - pt reports inability to tolerate solids contributed to low wt post op     Review of History and Medications   Past Medical History:   Diagnosis Date    Acute on chronic diastolic congestive heart failure (HCC)     Altered gait     Alzheimer disease (HCC)     per patients,,early onset     Angina pectoris (HCC)     Anxiety     Arthritis     Brain aneurysm     coils placed    Cardiac disease     Chest pain 2016    Chronic kidney disease     Chronic pain     back/ right groin and rle- has morphine pump    Constipation     COPD (chronic obstructive pulmonary disease) (HCC)     Coronary artery disease     CPAP " (continuous positive airway pressure) dependence     Decubital ulcer     sacral decub-occured 5/2019-sees wound care/debide in OR today 6/6/2019    Dependent on walker for ambulation     w/c for long distance    Depression     Diabetes mellitus ([facility])     insulin dependent    Difficulty walking     Dizziness     occ    Dysphagia     Enlarged prostate     Esophageal stricture In file    Esophageal varices ([facility])     Fall     Fall at home 05/03/2019    GERD (gastroesophageal reflux disease)     Heart failure ([facility])     Hiatal hernia     Hx of gastric bypass 11/19/2018    Hypercholesterolemia     Hypertension     Ingrown toenail     MI (myocardial infarction) ([facility])     2017- stents x2    Migraine     Morbid obesity ([facility])     gastric bypass sleeve 11/2018-wt loss 125 lb    Myocardial infarction ([facility]) 2003    Neuropathy     Obesity 2003    Oxygen dependent     Q HS  2LPM with CPAP and prn during day 2-3 LPM     Pressure injury of skin     Pressure injury of skin of sacral region 06/03/2019    Added automatically from request for surgery [id number]    Renal disorder     Shortness of breath     Skin abnormality     sacral wound - covered with pad    Sleep apnea     Stented coronary artery     Stroke ([facility])     vision loss b/l  2005, residual R leg weakness    Type 2 diabetes mellitus with diabetic neuropathy, with long-term current use of insulin ([facility]) 10/18/2019    Type 2 diabetes mellitus with renal complication ([facility])     insulin dependent    Type 2 diabetes mellitus, with long-term current use of insulin ([facility]) 10/11/2017    Transitioned From: Diabetes mellitus type 2, uncontrolled    Urinary frequency     Use of cane as ambulatory aid     Wears dentures     Wears glasses     Wears glasses     Wheelchair dependent      Past Surgical History:   Procedure Laterality Date    BACK SURGERY      BRAIN SURGERY      CARDIAC CATHETERIZATION      with stents    CARDIAC CATHETERIZATION N/A 8/18/2023    Procedure: Cardiac Coronary  Angiogram;  Surgeon: Horacio Weiner MD;  [facility]: [facility];  Service: Cardiology    CEREBRAL ANEURYSM REPAIR      with coils    COLONOSCOPY      ESOPHAGOGASTRODUODENOSCOPY N/A 7/1/2016    Procedure: ESOPHAGOGASTRODUODENOSCOPY (EGD);  Surgeon: Reno Christiansen MD;  Location: [facility];  Service:     GASTRIC BYPASS  11/19/2018    HERNIA REPAIR      HIATAL HERNIA REPAIR      INFUSION PUMP IMPLANTATION Left     morphine    INTRATHECAL PUMP IMPLANTATION Left 7/9/2020    Procedure: REVISION INTRATHECAL PAIN PUMP POCKET, LEFT ABDOMEN;  Surgeon: Homero Cho MD;  Location: [facility];  Service: Neurosurgery    KNEE ARTHROSCOPY Right     KNEE ARTHROSCOPY Right     PERONEAL NERVE DECOMPRESSION Right     CO DEBRIDEMENT OPEN WOUND FIRST 20 SQ CM/< N/A 6/6/2019    Procedure: EXCISIONAL DEBRIDEMENT OF SACRAL DECUBITUS ULCER;  Surgeon: Siddhartha Omer MD;  [facility];  Service: General    CO ESOPHAGOGASTRODUODENOSCOPY TRANSORAL DIAGNOSTIC N/A 2/27/2017    Procedure: ESOPHAGOGASTRODUODENOSCOPY (EGD);  Surgeon: Reno Christiansen MD;  [facility]: [facility];  Service: Gastroenterology    CO ESOPHAGOGASTRODUODENOSCOPY TRANSORAL DIAGNOSTIC N/A 8/23/2018    Procedure: ESOPHAGOGASTRODUODENOSCOPY (EGD) with biopsy;  Surgeon: Reno Christiansen MD;  [facility];  Service: Gastroenterology    CO IMPLTJ/RPLCMT ITHCL/EDRL DRUG NFS PRGRBL PUMP Left 10/13/2020    Procedure: EXPLORATION OF INTRATHECAL PAIN PUMP SYSTEM INTEGRITY FOR POSSIBLE REPLACEMENT OF CATHETER AND PUMP.;  Surgeon: Homero Cho MD;  Location[facility];  Service: Neurosurgery    CO IMPLTJ/RPLCMT ITHCL/EDRL DRUG NFS SUBQ RSVR N/A 1/19/2017    Procedure: REMOVAL / EXCHANGE INTRATHECAL PAIN PUMP;  Surgeon: Que Leonard MD;  [facility];  Service: Orthopedics    CO IMPLTJ/RPLCMT ITHCL/EDRL DRUG NFS SUBQ RSVR N/A 5/16/2016    Procedure: REPLACEMENT AND PROGRAM PUMP ;  Surgeon: Que Leonard MD;  [facility];  Service: Orthopedics    CO PRQ IMPLTJ  NSTIM ELECTRODE ARRAY EPIDURAL Right 3/17/2021    Procedure: INSERTION THORACIC DORSAL COLUMN SPINAL CORD STIMULATOR PERCUTANEOUS W IMPLANTABLE PULSE GENERATOR, RIGHT;  Surgeon: Homero Cho MD;  [facility];  Service: Neurosurgery     Social History     Socioeconomic History    Marital status: /Civil Union     Spouse name: Reva    Number of children: 5    Years of education: Not on file    Highest education level: GED or equivalent   Occupational History    Not on file   Tobacco Use    Smoking status: Never    Smokeless tobacco: Never   Vaping Use    Vaping status: Never Used   Substance and Sexual Activity    Alcohol use: Not Currently    Drug use: Not Currently     Types: Marijuana     Comment: Medical card    Sexual activity: Not Currently     Partners: Female   Other Topics Concern    Not on file   Social History Narrative    Not on file     Social Determinants of Health     Financial Resource Strain: High Risk (9/19/2023)    Overall Financial Resource Strain (CARDIA)     Difficulty of Paying Living Expenses: Very hard   Food Insecurity: Food Insecurity Present (3/29/2024)    Hunger Vital Sign     Worried About Running Out of Food in the Last Year: Sometimes true     Ran Out of Food in the Last Year: Sometimes true   Transportation Needs: No Transportation Needs (3/29/2024)    PRAPARE - Transportation     Lack of Transportation (Medical): No     Lack of Transportation (Non-Medical): No   Physical Activity: Inactive (9/6/2019)    Exercise Vital Sign     Days of Exercise per Week: 0 days     Minutes of Exercise per Session: 0 min   Stress: Stress Concern Present (9/6/2019)    [employer] [place] [employer] - Occupational Stress Questionnaire     Feeling of Stress : Very much   Social Connections: Moderately Isolated (9/6/2019)    Social Connection and Isolation Panel [NHANES]     Frequency of Communication with Friends and Family: Never     Frequency of Social Gatherings with Friends and  Family: Twice a week     Attends [employer] Services: 1 to 4 times per year     Active Member of Clubs or Organizations: No     Attends Club or Organization Meetings: Never     Marital Status:    Intimate Partner Violence: Not At Risk (9/6/2019)    Humiliation, Afraid, Rape, and Kick questionnaire     Fear of Current or Ex-Partner: No     Emotionally Abused: No     Physically Abused: No     Sexually Abused: No   Housing Stability: Low Risk  (3/29/2024)    Housing Stability Vital Sign     Unable to Pay for Housing in the Last Year: No     Number of Times Moved in the Last Year: 1     Homeless in the Last Year: No       Current Outpatient Medications:     albuterol (2.5 mg/3 mL) 0.083 % nebulizer solution, USE 1 VIAL IN NEBULIZER EVERY 6 HOURS AS NEEDED FOR WHEEZING OR SHORTNESS OF BREATH, Disp: 720 mL, Rfl: 5    amLODIPine (NORVASC) 5 mg tablet, Take 1 tablet (5 mg total) by mouth daily, Disp: 90 tablet, Rfl: 0    ammonium lactate (LAC-HYDRIN) 12 % cream, Apply topically as needed for dry skin, Disp: 385 g, Rfl: 2    aspirin 81 mg chewable tablet, Chew 1 tablet (81 mg total) daily, Disp: 90 tablet, Rfl: 1    atorvastatin (LIPITOR) 80 mg tablet, Take 1 tablet (80 mg total) by mouth daily after dinner, Disp: 90 tablet, Rfl: 0    baclofen 10 mg tablet, Take 10 mg by mouth 3 (three) times a day, Disp: , Rfl:     busPIRone (BUSPAR) 5 mg tablet, Take 5 mg by mouth 2 (two) times a day, Disp: , Rfl:     CALCIUM PO, Take 1 tablet by mouth daily, Disp: , Rfl:     carvedilol (COREG) 12.5 mg tablet, TAKE 1 TABLET BY MOUTH TWICE A DAY WITH FOOD, Disp: 60 tablet, Rfl: 3    clopidogrel (PLAVIX) 75 mg tablet, Take 1 tablet (75 mg total) by mouth daily, Disp: 90 tablet, Rfl: 1    Continuous Blood Gluc Sensor (Dexcom G6 Sensor) MISC, 3 PACK SENSOR FOR CONTINUOUS GLUCOSE MONITORING, Disp: 9 each, Rfl: 3    Continuous Blood Gluc Transmit (Dexcom G6 Transmitter) MISC, 1 TRANSMITTED EVERY 3 MONTHS FOR CONTINUOUS GLUCOSE MONITORING,  Disp: 1 each, Rfl: 3    Continuous Glucose  (Dexcom G6 ) CANDACE, USE AS DIRECTED, Disp: 1 each, Rfl: 0    dexamethasone (DECADRON) 2 mg tablet, Take 1 tablet (2 mg total) by mouth daily with breakfast, Disp: 5 tablet, Rfl: 0    docusate sodium (COLACE) 100 mg capsule, Take 1 capsule (100 mg total) by mouth 2 (two) times a day, Disp: 180 capsule, Rfl: 3    DULoxetine (Cymbalta) 30 mg delayed release capsule, Take 1 capsule (30 mg total) by mouth daily, Disp: 30 capsule, Rfl: 3    Empagliflozin (JARDIANCE) 10 MG TABS tablet, Take 1 tablet (10 mg total) by mouth every morning, Disp: 30 tablet, Rfl: 11    ergocalciferol (VITAMIN D2) 50,000 units, Take 1 capsule (50,000 Units total) by mouth once a week, Disp: 12 capsule, Rfl: 1    Fluticasone-Salmeterol (Advair Diskus) 500-50 mcg/dose inhaler, Inhale 1 puff 2 (two) times a day Rinse mouth after use, Disp: 60 blister, Rfl: 3    folic acid (FOLVITE) 1 mg tablet, Take 1 tablet (1 mg total) by mouth daily, Disp: 90 tablet, Rfl: 1    furosemide (LASIX) 40 mg tablet, Take once in AM daily. Take once daily in PM PRN weight gain, edema., Disp: 180 tablet, Rfl: 3    gabapentin (NEURONTIN) 800 mg tablet, Take 800 mg by mouth 4 (four) times a day, Disp: , Rfl:     hydrocortisone 1 % lotion, Apply topically if needed for rash, Disp: 59 mL, Rfl: 1    lidocaine (LIDODERM) 5 %, Apply 1 patch topically daily back, Disp: , Rfl:     losartan (COZAAR) 50 mg tablet, TAKE 1 TABLET BY MOUTH EVERY DAY, Disp: 90 tablet, Rfl: 1    methocarbamol (ROBAXIN) 750 mg tablet, TAKE 1 TABLET (750 MG TOTAL) BY MOUTH EVERY 6 (SIX) HOURS AS NEEDED FOR MUSCLE SPASMS, Disp: 120 tablet, Rfl: 0    naloxegol oxalate (MOVANTIK) 25 MG tablet, Take 1 tablet (25 mg total) by mouth daily in the early morning, Disp: 30 tablet, Rfl: 5    nitroglycerin (NITROSTAT) 0.4 mg SL tablet, PLACE 1 TABLET (0.4 MG TOTAL) UNDER THE TONGUE EVERY 5 (FIVE) MINUTES AS NEEDED FOR CHEST PAIN (PT HAS NOT USED RECENTLY),  Disp: 25 tablet, Rfl: 2    nystatin (MYCOSTATIN) cream, Apply 2 g (1 Application total) topically 2 (two) times a day, Disp: 15 g, Rfl: 0    ondansetron (ZOFRAN) 4 mg tablet, Take 1 tablet (4 mg total) by mouth every 8 (eight) hours as needed for nausea or vomiting, Disp: 60 tablet, Rfl: 3    potassium chloride (Klor-Con M20) 20 mEq tablet, Take 1 tablet (20 mEq total) by mouth daily, Disp: 90 tablet, Rfl: 0    ranolazine (RANEXA) 1000 MG SR tablet, TAKE 1 TABLET (1,000 MG TOTAL) BY MOUTH EVERY 12 HOURS, Disp: 60 tablet, Rfl: 3    tamsulosin (FLOMAX) 0.4 mg, TAKE 1 CAPSULE BY MOUTH EVERY DAY WITH DINNER, Disp: 90 capsule, Rfl: 1    traZODone (DESYREL) 100 mg tablet, Take 1 tablet (100 mg total) by mouth daily at bedtime, Disp: 30 tablet, Rfl: 0    vitamin B-12 (VITAMIN B-12) 1,000 mcg tablet, Take 1 tablet (1,000 mcg total) by mouth daily, Disp: 90 tablet, Rfl: 3    Food Intake and Lifestyle Assessment   Food Intake Assessment completed via usual diet recall  Breakfast: fluids   Snack: 0   Lunch: toast   Snack: 0  Dinner: small piece of chicken   Snack: 0  Beverage intake: water and regular vitamin water   Diet texture/stage: liquid  Protein supplement: no  Estimated protein intake per day: hard to determine based on recall   Pt is only able to tolerate liquids   Estimated fluid intake per day: 64 oz or more  Meals eaten away from home:  not assessed   Typical meal pattern: 2 meals per day and 0-1 snacks per day  Eating Behaviors: Pt drinks fluid all day   When able to tolerate solid foods- grazed throughout the day due to inability to tolerate any volume     Food allergies or intolerances: none noted   Cultural or Sabianist considerations: N/A    Vitamin and Mineral regimen  inadequate per CRNP    Physical Assessment  Nutrition Related Findings  Constipation, Nausea, and Vomiting  Pt reports constipation with chronic pain medications  Ongoing regurgitation, vomiting and nausea     Physical Activity  Types of  exercise: not assessed   Current physical limitations: cerebral aneurysm     Psychosocial Assessment   Support systems: spouse  Socioeconomic factors: not assessed     Nutrition Diagnosis  Diagnosis: Inadequate protein intake (NI-5.7.3)  Related to: Altered GI function  As Evidenced by: Patient report      Nutrition Prescription: Recommend the following diet  Pre op Liver shrinking diet   700-800 calories/  grams protein per day   80 oz of calorie free beverages   4 protein shakes, sugar free jello, sugar free beverages, sugar free water ice       Interventions and Teaching   Patient educated on post-op nutrition guidelines.       Patient educated and handouts provided.  Diet progression  Adequate hydration  Suggestions for pre-op diet  Nutrition considerations after surgery  Protein supplements  Potential for food intolerance after surgery, and ways to deal with them including: lactose intolerance, nausea, reflux, vomiting, diarrhea, food intolerance, appetite changes, gas    Reviewed ERAS drinks and Constipation protocol   Pt. educated on the pre operative liver shrinking diet.  Pt understands that the diet needs to be followed for  10 days up to 2 weeks prior to surgery. Handout reviewed.   Emphasized the need to drink 80 ounces of fluid per day while on the diet.     Will need to assess post op vitamins and minerals at post op appointment     Education provided to: patient    Barriers to learning: No barriers identified    Readiness to change: action    Comprehension: verbalizes understanding     Expected Compliance: good    Evaluation/Monitoring   Eating pattern as discussed Tolerance of nutrition prescription Body weight Lab values Physical activity Bowel pattern    Goals  Follow pre op liver shrinking diet - all liquids ( cannot tolerate vegetables )  ERAS drinks prior to surgery   Miralax 3 days prior to surgery date      Time Spent:   30 Minutes

## 2024-07-15 RX ORDER — GABAPENTIN 300 MG/1
300 CAPSULE ORAL 3 TIMES DAILY PRN
COMMUNITY

## 2024-07-15 NOTE — PRE-PROCEDURE INSTRUCTIONS
- daughter extremely concerned as pharmacist did not have record of her being on Otezla  - concerned that Otezla would possibly interfere with dialysis  - informed daughter that should have no negative impact of Otezla on her dialysis and that there is not a lot of evidence in the literature regarding the pharmacokinetics and pharmacodynamics of Otezla or its efficacy in patients underlying hemodialysis, but the medications should not interfere with the functional goal of dialysis  - daughter states that patient was not using the previous topicals regularly  - counseled patient should use the betamethasone on the weekend while the inflammatory lesions are present to help knock it back    Brianne Rivera MD  Dermatology Resident  [facility]   Pre-Surgery Instructions:   Medication Instructions    albuterol (2.5 mg/3 mL) 0.083 % nebulizer solution Uses PRN- OK to take day of surgery    amLODIPine (NORVASC) 5 mg tablet Take day of surgery.    ammonium lactate (LAC-HYDRIN) 12 % cream Hold day of surgery.    aspirin 81 mg chewable tablet Take day of surgery.    atorvastatin (LIPITOR) 80 mg tablet Take night before surgery    baclofen 10 mg tablet Uses PRN- OK to take day of surgery    busPIRone (BUSPAR) 5 mg tablet Take day of surgery.    CALCIUM PO Stop taking 7 days prior to surgery.    carvedilol (COREG) 12.5 mg tablet Take day of surgery.    clopidogrel (PLAVIX) 75 mg tablet Instructions provided by MD-to hold 5 days before sx as per Cardiology    Continuous Blood Gluc Sensor (Dexcom G6 Sensor) MISC Inst to point out to [facility] staff, & to bring  extra materials for DOS& to f/u w/  as needed              dexamethasone (DECADRON) 2 mg tablet Take day of surgery.    docusate sodium (COLACE) 100 mg capsule Hold day of surgery.    DULoxetine (Cymbalta) 30 mg delayed release capsule Take day of surgery.    Empagliflozin (JARDIANCE) 10 MG TABS tablet Stop taking 4 days prior to surgery.    ergocalciferol (VITAMIN D2) 50,000 units Stop taking 7 days prior to surgery.    Fluticasone-Salmeterol (Advair Diskus) 500-50 mcg/dose inhaler Take day of surgery.    folic acid (FOLVITE) 1 mg tablet Stop taking 7 days prior to surgery.    furosemide (LASIX) 40 mg tablet Hold day of surgery.    gabapentin (NEURONTIN) 300 mg capsule Uses PRN- OK to take day of surgery    gabapentin (NEURONTIN) 800 mg tablet Take day of surgery.    hydrocortisone 1 % lotion Hold day of surgery.    lidocaine (LIDODERM) 5 % Hold day of surgery.    losartan (COZAAR) 50 mg tablet Hold day of surgery.    methocarbamol (ROBAXIN) 750 mg tablet Uses PRN- OK to take day of surgery    naloxegol oxalate (MOVANTIK) 25 MG tablet Hold day of surgery.    nitroglycerin (NITROSTAT) 0.4 mg SL tablet  Uses PRN- OK to take day of surgery    nystatin (MYCOSTATIN) cream Hold day of surgery.    [START ON 7/24/2024] omeprazole (PriLOSEC) 20 mg delayed release capsule States will not take on DOS    ondansetron (ZOFRAN) 4 mg tablet Uses PRN- OK to take day of surgery    potassium chloride (Klor-Con M20) 20 mEq tablet Hold day of surgery.    ranolazine (RANEXA) 1000 MG SR tablet Take day of surgery.    tamsulosin (FLOMAX) 0.4 mg Take night before surgery    traZODone (DESYREL) 100 mg tablet Take night before surgery    vitamin B-12 (VITAMIN B-12) 1,000 mcg tablet Stop taking 7 days prior to surgery.    Medication instructions for day surgery reviewed w/ pt. Please use only a sip of water to take your instructed medications. Avoid all over the counter vitamins, supplements and NSAIDS for one week prior to surgery per anesthesia guidelines. Tylenol is ok to take as needed.     You will receive a call one business day prior to surgery with an arrival time and hospital directions. If your surgery is scheduled on a Monday, the hospital will be calling you on the Friday prior to your surgery. If you have not heard from anyone by 8pm, please call the hospital supervisor through the hospital  at [phone]. ([place] [phone] or [place]wen [phone]).    Do not eat or drink anything after midnight the night before your surgery, including candy, mints, lifesavers, or chewing gum. Do not drink alcohol 24hrs before your surgery. Try not to smoke at least 24hrs before your surgery.       Carb Drink Protocol reviewed - pt states has 3 carb drinks & verb understanding of process .     Inst to bring remote for spinal cord stimulator & device for implanted pain pump w/ him DOS.     Follow the pre surgery showering instructions as listed in the “My Surgical Experience Booklet” or otherwise provided by your surgeon's office. Do not use a blade to shave the surgical area 1 week before surgery. It is okay to use a clean  electric clippers up to 24 hours before surgery. Do not apply any lotions, creams, including makeup, cologne, deodorant, or perfumes after showering on the day of your surgery. Do not use dry shampoo, hair spray, hair gel, or any type of hair products.     No contact lenses, eye make-up, or artificial eyelashes. Remove nail polish, including gel polish, and any artificial, gel, or acrylic nails if possible. Remove all jewelry including rings and body piercing jewelry.     Wear causal clothing that is easy to take on and off. Consider your type of surgery.    Keep any valuables, jewelry, piercings at home. Please bring any specially ordered equipment (sling, braces) if indicated.    Arrange for a responsible person to drive you to and from the hospital on the day of your surgery. Please confirm the visitor policy for the day of your procedure when you receive your phone call with an arrival time.     Call the surgeon's office with any new illnesses, exposures, or additional questions prior to surgery.    Please reference your “My Surgical Experience Booklet” for additional information to prepare for your upcoming surgery.

## 2024-07-23 ENCOUNTER — HOSPITAL ENCOUNTER (OUTPATIENT)
Facility: HOSPITAL | Age: 64
Setting detail: OUTPATIENT SURGERY
Discharge: HOME | End: 2024-07-24
Attending: SURGERY | Admitting: SURGERY
Payer: COMMERCIAL

## 2024-07-23 ENCOUNTER — ANESTHESIA (OUTPATIENT)
Dept: PERIOP | Facility: HOSPITAL | Age: 64
End: 2024-07-23
Payer: COMMERCIAL

## 2024-07-23 DIAGNOSIS — N18.2 STAGE 2 CHRONIC KIDNEY DISEASE: ICD-10-CM

## 2024-07-23 DIAGNOSIS — Z98.890 S/P REPAIR OF PARAESOPHAGEAL HERNIA: ICD-10-CM

## 2024-07-23 DIAGNOSIS — Z86.73 HISTORY OF CVA (CEREBROVASCULAR ACCIDENT): ICD-10-CM

## 2024-07-23 DIAGNOSIS — I50.32 CHRONIC DIASTOLIC CONGESTIVE HEART FAILURE (HCC): ICD-10-CM

## 2024-07-23 DIAGNOSIS — Z98.84 BARIATRIC SURGERY STATUS: ICD-10-CM

## 2024-07-23 DIAGNOSIS — Z87.19 S/P REPAIR OF PARAESOPHAGEAL HERNIA: ICD-10-CM

## 2024-07-23 DIAGNOSIS — Z95.5 STENTED CORONARY ARTERY: ICD-10-CM

## 2024-07-23 DIAGNOSIS — K44.9 HIATAL HERNIA: Primary | ICD-10-CM

## 2024-07-23 DIAGNOSIS — E11.65 TYPE 2 DIABETES MELLITUS WITH HYPERGLYCEMIA, WITHOUT LONG-TERM CURRENT USE OF INSULIN (HCC): ICD-10-CM

## 2024-07-23 DIAGNOSIS — Z99.89 CPAP (CONTINUOUS POSITIVE AIRWAY PRESSURE) DEPENDENCE: ICD-10-CM

## 2024-07-23 LAB
GLUCOSE SERPL-MCNC: 151 MG/DL (ref 65–140)
GLUCOSE SERPL-MCNC: 168 MG/DL (ref 65–140)

## 2024-07-23 PROCEDURE — 82948 REAGENT STRIP/BLOOD GLUCOSE: CPT

## 2024-07-23 PROCEDURE — 43281 LAP PARAESOPHAG HERN REPAIR: CPT | Performed by: STUDENT IN AN ORGANIZED HEALTH CARE EDUCATION/TRAINING PROGRAM

## 2024-07-23 PROCEDURE — S2900 ROBOTIC SURGICAL SYSTEM: HCPCS | Performed by: SURGERY

## 2024-07-23 PROCEDURE — 43281 LAP PARAESOPHAG HERN REPAIR: CPT | Performed by: SURGERY

## 2024-07-23 PROCEDURE — C1889 IMPLANT/INSERT DEVICE, NOC: HCPCS | Performed by: SURGERY

## 2024-07-23 PROCEDURE — C9290 INJ, BUPIVACAINE LIPOSOME: HCPCS | Performed by: STUDENT IN AN ORGANIZED HEALTH CARE EDUCATION/TRAINING PROGRAM

## 2024-07-23 DEVICE — LINX REFLUX MANAGEMENT SYSTEM
Type: IMPLANTABLE DEVICE | Site: ESOPHAGUS | Status: FUNCTIONAL
Brand: LINX

## 2024-07-23 RX ORDER — METHOCARBAMOL 750 MG/1
750 TABLET, FILM COATED ORAL EVERY 6 HOURS PRN
Status: DISCONTINUED | OUTPATIENT
Start: 2024-07-23 | End: 2024-07-24 | Stop reason: HOSPADM

## 2024-07-23 RX ORDER — DEXAMETHASONE SODIUM PHOSPHATE 10 MG/ML
INJECTION, SOLUTION INTRAMUSCULAR; INTRAVENOUS AS NEEDED
Status: DISCONTINUED | OUTPATIENT
Start: 2024-07-23 | End: 2024-07-23

## 2024-07-23 RX ORDER — MAGNESIUM HYDROXIDE 1200 MG/15ML
LIQUID ORAL AS NEEDED
Status: DISCONTINUED | OUTPATIENT
Start: 2024-07-23 | End: 2024-07-23 | Stop reason: HOSPADM

## 2024-07-23 RX ORDER — ONDANSETRON 2 MG/ML
4 INJECTION INTRAMUSCULAR; INTRAVENOUS ONCE AS NEEDED
Status: DISCONTINUED | OUTPATIENT
Start: 2024-07-23 | End: 2024-07-23 | Stop reason: HOSPADM

## 2024-07-23 RX ORDER — GABAPENTIN 300 MG/1
300 CAPSULE ORAL 3 TIMES DAILY PRN
Status: DISCONTINUED | OUTPATIENT
Start: 2024-07-23 | End: 2024-07-24 | Stop reason: HOSPADM

## 2024-07-23 RX ORDER — DIPHENHYDRAMINE HCL 25 MG
25 TABLET ORAL EVERY 8 HOURS PRN
Status: DISCONTINUED | OUTPATIENT
Start: 2024-07-23 | End: 2024-07-24 | Stop reason: HOSPADM

## 2024-07-23 RX ORDER — FAMOTIDINE 10 MG/ML
20 INJECTION, SOLUTION INTRAVENOUS 2 TIMES DAILY
Status: DISCONTINUED | OUTPATIENT
Start: 2024-07-23 | End: 2024-07-24 | Stop reason: HOSPADM

## 2024-07-23 RX ORDER — METHADONE HYDROCHLORIDE 10 MG/ML
5 INJECTION, SOLUTION INTRAMUSCULAR; INTRAVENOUS; SUBCUTANEOUS ONCE
Status: COMPLETED | OUTPATIENT
Start: 2024-07-23 | End: 2024-07-23

## 2024-07-23 RX ORDER — ENOXAPARIN SODIUM 100 MG/ML
40 INJECTION SUBCUTANEOUS ONCE
Status: COMPLETED | OUTPATIENT
Start: 2024-07-23 | End: 2024-07-23

## 2024-07-23 RX ORDER — ACETAMINOPHEN 10 MG/ML
1000 INJECTION, SOLUTION INTRAVENOUS ONCE
Status: COMPLETED | OUTPATIENT
Start: 2024-07-23 | End: 2024-07-23

## 2024-07-23 RX ORDER — ASPIRIN 81 MG/1
81 TABLET, CHEWABLE ORAL DAILY
Status: DISCONTINUED | OUTPATIENT
Start: 2024-07-24 | End: 2024-07-24 | Stop reason: HOSPADM

## 2024-07-23 RX ORDER — SODIUM CHLORIDE, SODIUM LACTATE, POTASSIUM CHLORIDE, CALCIUM CHLORIDE 600; 310; 30; 20 MG/100ML; MG/100ML; MG/100ML; MG/100ML
INJECTION, SOLUTION INTRAVENOUS CONTINUOUS PRN
Status: DISCONTINUED | OUTPATIENT
Start: 2024-07-23 | End: 2024-07-23

## 2024-07-23 RX ORDER — CEFAZOLIN SODIUM 2 G/50ML
2000 SOLUTION INTRAVENOUS ONCE
Status: COMPLETED | OUTPATIENT
Start: 2024-07-23 | End: 2024-07-23

## 2024-07-23 RX ORDER — FENTANYL CITRATE/PF 50 MCG/ML
25 SYRINGE (ML) INJECTION
Status: DISCONTINUED | OUTPATIENT
Start: 2024-07-23 | End: 2024-07-23 | Stop reason: HOSPADM

## 2024-07-23 RX ORDER — TRAZODONE HYDROCHLORIDE 100 MG/1
100 TABLET ORAL
Status: DISCONTINUED | OUTPATIENT
Start: 2024-07-23 | End: 2024-07-24 | Stop reason: HOSPADM

## 2024-07-23 RX ORDER — SIMETHICONE 80 MG
80 TABLET,CHEWABLE ORAL 4 TIMES DAILY PRN
Status: DISCONTINUED | OUTPATIENT
Start: 2024-07-23 | End: 2024-07-24 | Stop reason: HOSPADM

## 2024-07-23 RX ORDER — KETAMINE HCL IN NACL, ISO-OSM 100MG/10ML
SYRINGE (ML) INJECTION AS NEEDED
Status: DISCONTINUED | OUTPATIENT
Start: 2024-07-23 | End: 2024-07-23

## 2024-07-23 RX ORDER — HYDROMORPHONE HYDROCHLORIDE 2 MG/ML
INJECTION, SOLUTION INTRAMUSCULAR; INTRAVENOUS; SUBCUTANEOUS AS NEEDED
Status: DISCONTINUED | OUTPATIENT
Start: 2024-07-23 | End: 2024-07-23

## 2024-07-23 RX ORDER — BACLOFEN 10 MG/1
10 TABLET ORAL 3 TIMES DAILY
Status: DISCONTINUED | OUTPATIENT
Start: 2024-07-23 | End: 2024-07-24 | Stop reason: HOSPADM

## 2024-07-23 RX ORDER — LIDOCAINE HYDROCHLORIDE 10 MG/ML
INJECTION, SOLUTION EPIDURAL; INFILTRATION; INTRACAUDAL; PERINEURAL AS NEEDED
Status: DISCONTINUED | OUTPATIENT
Start: 2024-07-23 | End: 2024-07-23

## 2024-07-23 RX ORDER — SODIUM CHLORIDE 9 MG/ML
50 INJECTION, SOLUTION INTRAVENOUS CONTINUOUS
Status: DISCONTINUED | OUTPATIENT
Start: 2024-07-23 | End: 2024-07-24

## 2024-07-23 RX ORDER — CARVEDILOL 12.5 MG/1
12.5 TABLET ORAL 2 TIMES DAILY WITH MEALS
Status: DISCONTINUED | OUTPATIENT
Start: 2024-07-24 | End: 2024-07-24 | Stop reason: HOSPADM

## 2024-07-23 RX ORDER — EPHEDRINE SULFATE 50 MG/ML
INJECTION INTRAVENOUS AS NEEDED
Status: DISCONTINUED | OUTPATIENT
Start: 2024-07-23 | End: 2024-07-23

## 2024-07-23 RX ORDER — HYDROMORPHONE HCL/PF 1 MG/ML
0.5 SYRINGE (ML) INJECTION
Status: DISCONTINUED | OUTPATIENT
Start: 2024-07-23 | End: 2024-07-23 | Stop reason: HOSPADM

## 2024-07-23 RX ORDER — BUSPIRONE HYDROCHLORIDE 5 MG/1
5 TABLET ORAL 2 TIMES DAILY
Status: DISCONTINUED | OUTPATIENT
Start: 2024-07-23 | End: 2024-07-24 | Stop reason: HOSPADM

## 2024-07-23 RX ORDER — SODIUM CHLORIDE 9 MG/ML
INJECTION INTRAVENOUS AS NEEDED
Status: DISCONTINUED | OUTPATIENT
Start: 2024-07-23 | End: 2024-07-23 | Stop reason: HOSPADM

## 2024-07-23 RX ORDER — MEPERIDINE HYDROCHLORIDE 25 MG/ML
12.5 INJECTION INTRAMUSCULAR; INTRAVENOUS; SUBCUTANEOUS
Status: DISCONTINUED | OUTPATIENT
Start: 2024-07-23 | End: 2024-07-23 | Stop reason: HOSPADM

## 2024-07-23 RX ORDER — CLOPIDOGREL BISULFATE 75 MG/1
75 TABLET ORAL DAILY
Status: DISCONTINUED | OUTPATIENT
Start: 2024-07-24 | End: 2024-07-24 | Stop reason: HOSPADM

## 2024-07-23 RX ORDER — HYDROMORPHONE HCL/PF 1 MG/ML
0.5 SYRINGE (ML) INJECTION EVERY 4 HOURS PRN
Status: DISCONTINUED | OUTPATIENT
Start: 2024-07-23 | End: 2024-07-24 | Stop reason: HOSPADM

## 2024-07-23 RX ORDER — ONDANSETRON 2 MG/ML
INJECTION INTRAMUSCULAR; INTRAVENOUS AS NEEDED
Status: DISCONTINUED | OUTPATIENT
Start: 2024-07-23 | End: 2024-07-23

## 2024-07-23 RX ORDER — ROCURONIUM BROMIDE 10 MG/ML
INJECTION, SOLUTION INTRAVENOUS AS NEEDED
Status: DISCONTINUED | OUTPATIENT
Start: 2024-07-23 | End: 2024-07-23

## 2024-07-23 RX ORDER — DULOXETIN HYDROCHLORIDE 30 MG/1
30 CAPSULE, DELAYED RELEASE ORAL DAILY
Status: DISCONTINUED | OUTPATIENT
Start: 2024-07-23 | End: 2024-07-24 | Stop reason: HOSPADM

## 2024-07-23 RX ORDER — GABAPENTIN 400 MG/1
800 CAPSULE ORAL 4 TIMES DAILY
Status: DISCONTINUED | OUTPATIENT
Start: 2024-07-23 | End: 2024-07-24 | Stop reason: HOSPADM

## 2024-07-23 RX ORDER — SODIUM CHLORIDE, SODIUM LACTATE, POTASSIUM CHLORIDE, CALCIUM CHLORIDE 600; 310; 30; 20 MG/100ML; MG/100ML; MG/100ML; MG/100ML
50 INJECTION, SOLUTION INTRAVENOUS CONTINUOUS
Status: DISCONTINUED | OUTPATIENT
Start: 2024-07-23 | End: 2024-07-24

## 2024-07-23 RX ORDER — PROPOFOL 10 MG/ML
INJECTION, EMULSION INTRAVENOUS AS NEEDED
Status: DISCONTINUED | OUTPATIENT
Start: 2024-07-23 | End: 2024-07-23

## 2024-07-23 RX ORDER — HYDROMORPHONE HCL/PF 1 MG/ML
2 SYRINGE (ML) INJECTION EVERY 4 HOURS PRN
Status: DISCONTINUED | OUTPATIENT
Start: 2024-07-23 | End: 2024-07-23

## 2024-07-23 RX ORDER — ACETAMINOPHEN 10 MG/ML
1000 INJECTION, SOLUTION INTRAVENOUS EVERY 6 HOURS SCHEDULED
Status: DISCONTINUED | OUTPATIENT
Start: 2024-07-23 | End: 2024-07-24 | Stop reason: HOSPADM

## 2024-07-23 RX ORDER — MIDAZOLAM HYDROCHLORIDE 2 MG/2ML
INJECTION, SOLUTION INTRAMUSCULAR; INTRAVENOUS AS NEEDED
Status: DISCONTINUED | OUTPATIENT
Start: 2024-07-23 | End: 2024-07-23

## 2024-07-23 RX ORDER — CELECOXIB 200 MG/1
200 CAPSULE ORAL ONCE
Status: COMPLETED | OUTPATIENT
Start: 2024-07-23 | End: 2024-07-23

## 2024-07-23 RX ORDER — BUPIVACAINE HYDROCHLORIDE 5 MG/ML
INJECTION, SOLUTION EPIDURAL; INTRACAUDAL AS NEEDED
Status: DISCONTINUED | OUTPATIENT
Start: 2024-07-23 | End: 2024-07-23 | Stop reason: HOSPADM

## 2024-07-23 RX ORDER — GLYCOPYRROLATE 0.2 MG/ML
INJECTION INTRAMUSCULAR; INTRAVENOUS AS NEEDED
Status: DISCONTINUED | OUTPATIENT
Start: 2024-07-23 | End: 2024-07-23

## 2024-07-23 RX ORDER — ONDANSETRON 2 MG/ML
4 INJECTION INTRAMUSCULAR; INTRAVENOUS EVERY 6 HOURS PRN
Status: DISCONTINUED | OUTPATIENT
Start: 2024-07-23 | End: 2024-07-24 | Stop reason: HOSPADM

## 2024-07-23 RX ORDER — TAMSULOSIN HYDROCHLORIDE 0.4 MG/1
0.4 CAPSULE ORAL
Status: DISCONTINUED | OUTPATIENT
Start: 2024-07-23 | End: 2024-07-24 | Stop reason: HOSPADM

## 2024-07-23 RX ADMIN — SUGAMMADEX 600 MG: 100 INJECTION, SOLUTION INTRAVENOUS at 12:50

## 2024-07-23 RX ADMIN — HYDROMORPHONE HYDROCHLORIDE 0.4 MG: 2 INJECTION INTRAMUSCULAR; INTRAVENOUS; SUBCUTANEOUS at 12:43

## 2024-07-23 RX ADMIN — METHADONE HYDROCHLORIDE 5 MG: 10 INJECTION, SOLUTION INTRAMUSCULAR; INTRAVENOUS; SUBCUTANEOUS at 11:07

## 2024-07-23 RX ADMIN — ONDANSETRON 4 MG: 2 INJECTION INTRAMUSCULAR; INTRAVENOUS at 11:13

## 2024-07-23 RX ADMIN — LIDOCAINE HYDROCHLORIDE 100 MG: 10 INJECTION, SOLUTION EPIDURAL; INFILTRATION; INTRACAUDAL; PERINEURAL at 11:09

## 2024-07-23 RX ADMIN — ACETAMINOPHEN 1000 MG: 10 INJECTION INTRAVENOUS at 16:47

## 2024-07-23 RX ADMIN — ENOXAPARIN SODIUM 40 MG: 40 INJECTION SUBCUTANEOUS at 08:37

## 2024-07-23 RX ADMIN — Medication 10 MG: at 12:21

## 2024-07-23 RX ADMIN — SODIUM CHLORIDE: 0.9 INJECTION, SOLUTION INTRAVENOUS at 11:15

## 2024-07-23 RX ADMIN — SODIUM CHLORIDE 50 ML/HR: 0.9 INJECTION, SOLUTION INTRAVENOUS at 08:40

## 2024-07-23 RX ADMIN — CEFAZOLIN SODIUM 3000 MG: 2 SOLUTION INTRAVENOUS at 11:14

## 2024-07-23 RX ADMIN — Medication 10 MG: at 12:40

## 2024-07-23 RX ADMIN — HYDROMORPHONE HYDROCHLORIDE 0.4 MG: 2 INJECTION INTRAMUSCULAR; INTRAVENOUS; SUBCUTANEOUS at 12:02

## 2024-07-23 RX ADMIN — MIDAZOLAM 2 MG: 1 INJECTION INTRAMUSCULAR; INTRAVENOUS at 11:07

## 2024-07-23 RX ADMIN — HYDROMORPHONE HYDROCHLORIDE 0.4 MG: 2 INJECTION INTRAMUSCULAR; INTRAVENOUS; SUBCUTANEOUS at 12:15

## 2024-07-23 RX ADMIN — Medication 10 MG: at 11:52

## 2024-07-23 RX ADMIN — SODIUM CHLORIDE 0.4 MCG/KG/HR: 9 INJECTION, SOLUTION INTRAVENOUS at 11:15

## 2024-07-23 RX ADMIN — PROPOFOL 50 MG: 10 INJECTION, EMULSION INTRAVENOUS at 12:25

## 2024-07-23 RX ADMIN — CELECOXIB 200 MG: 200 CAPSULE ORAL at 08:37

## 2024-07-23 RX ADMIN — Medication 10 MG: at 11:24

## 2024-07-23 RX ADMIN — ROCURONIUM BROMIDE 10 MG: 10 INJECTION, SOLUTION INTRAVENOUS at 12:36

## 2024-07-23 RX ADMIN — ROCURONIUM BROMIDE 100 MG: 10 INJECTION, SOLUTION INTRAVENOUS at 11:09

## 2024-07-23 RX ADMIN — SODIUM CHLORIDE, SODIUM LACTATE, POTASSIUM CHLORIDE, AND CALCIUM CHLORIDE: .6; .31; .03; .02 INJECTION, SOLUTION INTRAVENOUS at 12:14

## 2024-07-23 RX ADMIN — ACETAMINOPHEN 1000 MG: 10 INJECTION INTRAVENOUS at 11:05

## 2024-07-23 RX ADMIN — HYDROMORPHONE HYDROCHLORIDE 0.4 MG: 2 INJECTION INTRAMUSCULAR; INTRAVENOUS; SUBCUTANEOUS at 11:57

## 2024-07-23 RX ADMIN — EPHEDRINE SULFATE 10 MG: 50 INJECTION, SOLUTION INTRAVENOUS at 11:15

## 2024-07-23 RX ADMIN — Medication 50 MG: at 11:09

## 2024-07-23 RX ADMIN — FOSAPREPITANT DIMEGLUMINE 150 MG: 150 INJECTION, POWDER, LYOPHILIZED, FOR SOLUTION INTRAVENOUS at 08:36

## 2024-07-23 RX ADMIN — PROPOFOL 20 MG: 10 INJECTION, EMULSION INTRAVENOUS at 12:58

## 2024-07-23 RX ADMIN — HYDROMORPHONE HYDROCHLORIDE 0.4 MG: 2 INJECTION INTRAMUSCULAR; INTRAVENOUS; SUBCUTANEOUS at 11:52

## 2024-07-23 RX ADMIN — DEXAMETHASONE SODIUM PHOSPHATE 10 MG: 10 INJECTION INTRAMUSCULAR; INTRAVENOUS at 11:13

## 2024-07-23 RX ADMIN — FAMOTIDINE 20 MG: 10 INJECTION, SOLUTION INTRAVENOUS at 22:05

## 2024-07-23 RX ADMIN — ROCURONIUM BROMIDE 20 MG: 10 INJECTION, SOLUTION INTRAVENOUS at 12:01

## 2024-07-23 RX ADMIN — Medication 10 MG: at 12:09

## 2024-07-23 RX ADMIN — GLYCOPYRROLATE 0.2 MG: 0.2 INJECTION, SOLUTION INTRAMUSCULAR; INTRAVENOUS at 11:07

## 2024-07-23 RX ADMIN — PROPOFOL 100 MG: 10 INJECTION, EMULSION INTRAVENOUS at 11:09

## 2024-07-23 NOTE — OP NOTE
OPERATIVE REPORT  PATIENT NAME: Aristeo Hall    :  1960  MRN: [id number]  Pt Location: [facility] OR ROOM 08    SURGERY DATE: 2024    Surgeons and Role:     * Mansi Mauro MD - Primary     * Yunier Urban MD - Fellow    Preop Diagnosis:  Hiatal hernia [K44.9]  Esophageal reflux [K21.9]  Swallowing difficulty [R13.10]  Gastroesophagitis [K29.70, K20.90]  Regurgitation and rechewing [R11.10]  ARLEEN on CPAP [G47.33]  Diabetes mellitus (HCC) [E11.9]    Post-Op Diagnosis Codes:     * Hiatal hernia [K44.9]     * Esophageal reflux [K21.9]     * Swallowing difficulty [R13.10]     * Gastroesophagitis [K29.70, K20.90]     * Regurgitation and rechewing [R11.10]     * ARLEEN on CPAP [G47.33]     * Diabetes mellitus (HCC) [E11.9]    Procedure(s):  REPAIR HERNIA PARAESOPHAGEAL  W ROBOTICS & MAGNETIC SPINCTER AUGMENTATION DEVISE    Specimen(s):  * No specimens in log *    Estimated Blood Loss:   Minimal    Drains:  Urethral Catheter Non-latex 16 Fr. (Active)   Number of days: 0       Anesthesia Type:   General    Operative Indications:  Hiatal hernia [K44.9]  Esophageal reflux [K21.9]  Swallowing difficulty [R13.10]  Gastroesophagitis [K29.70, K20.90]  Regurgitation and rechewing [R11.10]  ARLEEN on CPAP [G47.33]  Diabetes mellitus (HCC) [E11.9]      Operative Findings:  Hiatal hernia s/p Sleeve Gastrectomy      Complications:   None    Procedure and Technique:  Robotic paraesophageal hernia repair without mesh  Robotic sphincter augmentation using LINX 15   I was present for the entire procedure.    Patient Disposition:  hemodynamically stable        No qualified resident available to assist  Assistance was necessary for traction counter traction and assistance with stapling and intraop endoscopy  Description of the procedure:  The patient was brought to the operating room and placed in a supine position. The patient received a dose of IV antibiotics and a dose of subcutaneous Lovenox, prior to the procedure.  The patient was induced under general endotracheal anesthesia. The abdominal wall was prepped and draped under sterile conditions in the usual fashion. The procedure was started by obtaining access to the abdominal cavity using a Veress needle to the left side of the midline around 6 inches from the xiphoid process the abdominal cavity was insufflated with CO2 to a pressure of 15 mmHg. After that, the abdomen was entered with an 8 mm trocar using an Optiview trocar under direct visualization. At that point, two 8-mm trocar were placed on the right side of the abdominal wall, under direct visualization, and two 8- mm and 12-mm trocars were placed on the left side of the abdominal wall, also under direct visualization. The patient was then placed in a reverse Trendelenburg position. A Bernice retractor was placed through a small stab incision below the xiphoid and was used to retract the left lobe of the liver in a medial fashion. An OG tube was placed to decompress the stomach and then removed immediately.  Robot was then docked.     I started the dissection on the right side by taking the pars flaccida down all the way up to the right cole the right cole was dissected away from the hernia sac and esophageal wall, the right vagus nerve was clearly identified and kept out of harm's way.  I then turned my attention to the left cole, in order to expose left cole clearly I mobilized the gastric pouch  using the vessel sealer of note the upper portion of the pouch had herniated into the thoracic cavity.  The pouch was reduced and the hernia sac dissected away from the left cole and then the plane of dissection around the left cole was connected anteriorly with my plane of dissection that was developed previously while dissecting the right cole.  The phrenoesophageal ligament was also taken down in its entirety and the hernia sac dissected anteriorly away from the esophageal wall left vagus nerve was identified and  preserved.  At that point the hernia sac was completely dissected and excised. The decision was then made to repair the hernia posteriorly. Right cole and left cole were dissected away from the hernia sac and skeletonized all the way down to the confluence. Esophagus was kept out of harm's way during the dissection. Both vagi were also identified and preserved as previously mentioned.  Dissection was carried all the way up in the thoracic cavity to obtain more length of the esophagus. The dissection was completed circumferentially around the esophagus. We had at least 3 cm of the lower esophagus in an intra-abdominal location by the end of the dissection.  Hernia sac was completely dissected and excised. Right cole and left cole were then approximated using 0 Ethibond sutures placed in an interrupted fashion.  A plane was developed between the right vagus nerve and the esophageal wall and using the sizer the esophagus was sized to 15 after that a LINX 15 device was placed through that plane and locked in place as per the  instructions.  Of note we had at least 3 cm tween the device and the hiatus by the end of the procedure.     We then removed the Bernice liver retractor.  All the trocars were removed under direct visualization, and the skin edges were approximated using 4-0 Monocryl in an interrupted, inverted fashion. Histoacryl was then applied to the skin incisions.      The patient was extubated and transferred to the PACU in stable condition.   SIGNATURE: Mansi Mauro MD  DATE: July 23, 2024  TIME: 12:52 PM

## 2024-07-23 NOTE — ANESTHESIA PREPROCEDURE EVALUATION
Procedure:  REPAIR HERNIA PARAESOPHAGEAL  W ROBOTICS & MAGNETIC SPINCTER AUGMENTATION DEVISE (Abdomen)    Relevant Problems   CARDIO   (+) Arteriosclerosis of artery of extremity (HCC)   (+) Cerebral aneurysm   (+) Chronic diastolic congestive heart failure (HCC)   (+) Chronic migraine without aura   (+) Chronic migraine without aura without status migrainosus, not intractable   (+) Coronary artery disease of native artery of native heart with stable angina pectoris ([facility])   (+) Hyperlipidemia      ENDO   (+) Type 2 diabetes mellitus with hyperglycemia, without long-term current use of insulin ([facility])      GI/HEPATIC   (+) Esophageal dysphagia   (+) GERD (gastroesophageal reflux disease)   (+) Hiatal hernia      /RENAL   (+) Stage 2 chronic kidney disease      MUSCULOSKELETAL   (+) Hiatal hernia   (+) Primary osteoarthritis of both knees      NEURO/PSYCH   (+) Chronic daily headache   (+) Chronic migraine without aura   (+) Chronic migraine without aura without status migrainosus, not intractable   (+) Chronic pain disorder   (+) Diabetic neuropathy ([facility])   (+) Major depressive disorder, recurrent, moderate ([facility])   (+) Opioid dependence, continuous ([facility])      PULMONARY   (+) COPD (chronic obstructive pulmonary disease) ([facility])   (+) ARLEEN (obstructive sleep apnea)   (+) Obstructive sleep apnea syndrome      Neurology/Sleep   (+) History of CVA (cerebrovascular accident)      Surgery/Wound/Pain   (+) Bariatric surgery status      Other   (+) Morbid obesity ([facility])    Intrathecal pump hydromorphone + bupivicaine daily continuouous 1.7 mg hydromorphone + 3.4 mg bupivicaine + bolus doses 0.2 mg hydromprphone .4mg bupivicaine q3 hrs daily max 5 doses.. Pt self bolused 7 am, next dose due 11 am then 3 pm.     Physical Exam    Airway    Mallampati score: II  TM Distance: >3 FB  Neck ROM: full     Dental    upper dentures and lower dentures    Cardiovascular  Cardiovascular exam normal    Pulmonary  Pulmonary exam normal     Other  Findings        Anesthesia Plan  ASA Score- 3     Anesthesia Type- general with ASA Monitors.         Additional Monitors:     Airway Plan: ETT.           Plan Factors-Exercise tolerance (METS): >4 METS.    Chart reviewed.   Existing labs reviewed. Patient summary reviewed.    Patient is not a current smoker.      Obstructive sleep apnea risk education given perioperatively.        Induction- intravenous.    Postoperative Plan- Plan for postoperative opioid use.     Perioperative Resuscitation Plan - Level 1 - Full Code.       Informed Consent- Anesthetic plan and risks discussed with patient.

## 2024-07-23 NOTE — DISCHARGE INSTR - AVS FIRST PAGE
Bariatric/Weight Loss Surgery  Hospital Discharge Instructions  ACTIVITY:  Progress as feels comfortable - a good rule is:  if you are doing something and it begins to hurt, stop doing the activity. Walk every hour while at home.  You may walk stairs if you do so slowly  You may shower 48 hours after surgery.  Do not scrub incision sites.  Blot gently with clean towel to dry incisions. (see #4 below)   Use your incentive spirometer 10 times per hour while awake for 1 week after surgery.  Do NOT drive for 48 hours after surgery. No driving 24 hours after taking certain prescription pain medications. Examples of such medication are Percocet, Darvocet, Oxycodone, Tylenol #3, and Tylenol with Codeine.     DIET  6 soft meals 6 times daily for 6 weeks.    MEDICATIONS:  The abdominal nerve block will wear off during the first 1-2 days that you are home, and you may become sore (especially over incision site/sites where abdominal wall is sutured). This may create a pulling sensation, especially while moving around, and will fade over time.  Continue to take your Tylenol and your pain medication as instructed.   Start vitamins and minerals one week after surgery or when you start stage 3/puree diet.   Anti-acid Medication as per prescription.  Other medications as indicated on the Physician Patient Discharge Instructions form given to you at the time of discharge.  Make sure that you are splitting your pill or tablet medications in halves or fourths or even crushing them before you take them. Capsules should be opened and mixed with water or jello. You need to do this for at least 4 weeks after surgery. Eventually you will be able to take your medications the regular way as they were prescribed.   You will need to consult with your Family Doctor in regards to all your prescribed medication, particularly those for blood pressure and diabetes.  As you lose weight, medical conditions may change, requiring an alteration or  elimination of the drug dose. Monitor blood pressure closely and call PCP with any concerns.     INCISION CARE  You may shower and get incisions wet 2 days after surgery. No soaking tub baths or swimming for 30 days after surgery. Keep abdominal area and incisions clean. Use soap and water to create a good lather and rinse off.  Do not scrub incisions.   If you have a drain, empty the drain as the nurses instructed.    FOLLOW-UP APPOINTMENT should be made for one week after discharge. Call surgeon’s office at [phone] to schedule an appointment.    CALL YOUR DOCTOR FOR:  pain not controlled by pain medications, a temperature greater than 101.5° F, any increase or change in drainage or redness from any incision, any vomiting or inability to keep liquids down, shortness of breath, shoulder pain, or bleeding

## 2024-07-23 NOTE — INTERVAL H&P NOTE
H&P reviewed. After examining the patient I find no changes in the patients condition since the H&P had been written.    Vitals:    07/23/24 0827   BP: 139/67   Pulse: 61   Resp: 16   Temp: (!) 97.2 °F (36.2 °C)   SpO2: 96%

## 2024-07-23 NOTE — DISCHARGE INSTRUCTIONS
your medications from [facility] in Hospital Lobby   Crush or cut your pills and open capsules, mix with liquid to drink.  Take Tylenol every 8 hours around the clock, unless instructed otherwise  Take your omeprazole daily  It is important to stay hydrated and follow your discharge diet progression   Mild nausea is ok as long as you can drink fluids, sip very slowly and get up and walk during any periods of nausea  You may shower normally after 48 hours, but do not scrub incision sites, blot gently with clean towel to dry incisions  Take home medications as usual unless instructed otherwise while in hospital  Follow up with Dr. Brad Mauro and your PCP within the next week

## 2024-07-23 NOTE — ANESTHESIA POSTPROCEDURE EVALUATION
Post-Op Assessment Note    CV Status:  Stable    Pain management: adequate       Mental Status:  Alert and awake   Hydration Status:  Euvolemic   PONV Controlled:  Controlled   Airway Patency:  Patent     Post Op Vitals Reviewed: Yes    No anethesia notable event occurred.    Staff: Anesthesiologist               BP      Temp      Pulse     Resp      SpO2      /66   Pulse (!) 52   Temp 97.7 °F (36.5 °C)   Resp 16   Ht 6' (1.829 m)   Wt 134 kg (295 lb)   SpO2 94%   BMI 40.01 kg/m²

## 2024-07-23 NOTE — PLAN OF CARE
Problem: Prexisting or High Potential for Compromised Skin Integrity  Goal: Skin integrity is maintained or improved  Description: INTERVENTIONS:  - Identify patients at risk for skin breakdown  - Assess and monitor skin integrity  - Assess and monitor nutrition and hydration status  - Monitor labs   - Assess for incontinence   - Turn and reposition patient  - Assist with mobility/ambulation  - Relieve pressure over bony prominences  - Avoid friction and shearing  - Provide appropriate hygiene as needed including keeping skin clean and dry  - Evaluate need for skin moisturizer/barrier cream  - Collaborate with interdisciplinary team   - Patient/family teaching  - Consider wound care consult   Outcome: Progressing     Problem: PAIN - ADULT  Goal: Verbalizes/displays adequate comfort level or baseline comfort level  Description: Interventions:  - Encourage patient to monitor pain and request assistance  - Assess pain using appropriate pain scale  - Administer analgesics based on type and severity of pain and evaluate response  - Implement non-pharmacological measures as appropriate and evaluate response  - Consider cultural and social influences on pain and pain management  - Notify physician/advanced practitioner if interventions unsuccessful or patient reports new pain  Outcome: Progressing     Problem: INFECTION - ADULT  Goal: Absence or prevention of progression during hospitalization  Description: INTERVENTIONS:  - Assess and monitor for signs and symptoms of infection  - Monitor lab/diagnostic results  - Monitor all insertion sites, i.e. indwelling lines, tubes, and drains  - Monitor endotracheal if appropriate and nasal secretions for changes in amount and color  - [place] appropriate cooling/warming therapies per order  - Administer medications as ordered  - Instruct and encourage patient and family to use good hand hygiene technique  - Identify and instruct in appropriate isolation precautions for  identified infection/condition  Outcome: Progressing     Problem: SAFETY ADULT  Goal: Patient will remain free of falls  Description: INTERVENTIONS:  - Educate patient/family on patient safety including physical limitations  - Instruct patient to call for assistance with activity   - Consult OT/PT to assist with strengthening/mobility   - Keep Call bell within reach  - Keep bed low and locked with side rails adjusted as appropriate  - Keep care items and personal belongings within reach  - Initiate and maintain comfort rounds  - Make Fall Risk Sign visible to staff  - Apply yellow socks and bracelet for high fall risk patients  - Consider moving patient to room near nurses station  Outcome: Progressing     Problem: DISCHARGE PLANNING  Goal: Discharge to home or other facility with appropriate resources  Description: INTERVENTIONS:  - Identify barriers to discharge w/patient and caregiver  - Arrange for needed discharge resources and transportation as appropriate  - Identify discharge learning needs (meds, wound care, etc.)  - Arrange for interpretive services to assist at discharge as needed  - Refer to Case Management Department for coordinating discharge planning if the patient needs post-hospital services based on physician/advanced practitioner order or complex needs related to functional status, cognitive ability, or social support system  Outcome: Progressing     Problem: Knowledge Deficit  Goal: Patient/family/caregiver demonstrates understanding of disease process, treatment plan, medications, and discharge instructions  Description: Complete learning assessment and assess knowledge base.  Interventions:  - Provide teaching at level of understanding  - Provide teaching via preferred learning methods  Outcome: Progressing     Problem: RESPIRATORY - ADULT  Goal: Achieves optimal ventilation and oxygenation  Description: INTERVENTIONS:  - Assess for changes in respiratory status  - Assess for changes in  mentation and behavior  - Position to facilitate oxygenation and minimize respiratory effort  - Oxygen administered by appropriate delivery if ordered  - Initiate smoking cessation education as indicated  - Encourage broncho-pulmonary hygiene including cough, deep breathe, Incentive Spirometry  - Assess the need for suctioning and aspirate as needed  - Assess and instruct to report SOB or any respiratory difficulty  - Respiratory Therapy support as indicated  Outcome: Progressing     Problem: GASTROINTESTINAL - ADULT  Goal: Minimal or absence of nausea and/or vomiting  Description: INTERVENTIONS:  - Administer IV fluids if ordered to ensure adequate hydration  - Maintain NPO status until nausea and vomiting are resolved  - Nasogastric tube if ordered  - Administer ordered antiemetic medications as needed  - Provide nonpharmacologic comfort measures as appropriate  - Advance diet as tolerated, if ordered  - Consider nutrition services referral to assist patient with adequate nutrition and appropriate food choices  Outcome: Progressing     Problem: GENITOURINARY - ADULT  Goal: Absence of urinary retention  Description: INTERVENTIONS:  - Assess patient’s ability to void and empty bladder  - Monitor I/O  - Bladder scan as needed  - Discuss with physician/AP medications to alleviate retention as needed  - Discuss catheterization for long term situations as appropriate  Outcome: Progressing

## 2024-07-24 ENCOUNTER — APPOINTMENT (OUTPATIENT)
Dept: RADIOLOGY | Facility: HOSPITAL | Age: 64
End: 2024-07-24
Payer: COMMERCIAL

## 2024-07-24 VITALS
SYSTOLIC BLOOD PRESSURE: 124 MMHG | WEIGHT: 295 LBS | DIASTOLIC BLOOD PRESSURE: 63 MMHG | HEIGHT: 72 IN | HEART RATE: 51 BPM | OXYGEN SATURATION: 93 % | RESPIRATION RATE: 18 BRPM | BODY MASS INDEX: 39.96 KG/M2 | TEMPERATURE: 98.2 F

## 2024-07-24 PROBLEM — Z98.890 S/P REPAIR OF PARAESOPHAGEAL HERNIA: Status: ACTIVE | Noted: 2024-07-24

## 2024-07-24 PROBLEM — Z87.19 S/P REPAIR OF PARAESOPHAGEAL HERNIA: Status: ACTIVE | Noted: 2024-07-24

## 2024-07-24 PROBLEM — F41.9 ANXIETY AND DEPRESSION: Status: ACTIVE | Noted: 2024-07-24

## 2024-07-24 PROBLEM — F32.A ANXIETY AND DEPRESSION: Status: ACTIVE | Noted: 2024-07-24

## 2024-07-24 LAB
ANION GAP SERPL CALCULATED.3IONS-SCNC: 6 MMOL/L (ref 4–13)
BUN SERPL-MCNC: 13 MG/DL (ref 5–25)
CALCIUM SERPL-MCNC: 8.5 MG/DL (ref 8.4–10.2)
CHLORIDE SERPL-SCNC: 103 MMOL/L (ref 96–108)
CO2 SERPL-SCNC: 26 MMOL/L (ref 21–32)
CREAT SERPL-MCNC: 1.06 MG/DL (ref 0.6–1.3)
ERYTHROCYTE [DISTWIDTH] IN BLOOD BY AUTOMATED COUNT: 13.2 % (ref 11.6–15.1)
GFR SERPL CREATININE-BSD FRML MDRD: 75 ML/MIN/1.73SQ M
GLUCOSE SERPL-MCNC: 140 MG/DL (ref 65–140)
HCT VFR BLD AUTO: 39.6 % (ref 36.5–49.3)
HCT VFR BLD AUTO: 40.5 % (ref 36.5–49.3)
HGB BLD-MCNC: 12.8 G/DL (ref 12–17)
HGB BLD-MCNC: 13.3 G/DL (ref 12–17)
MCH RBC QN AUTO: 29.5 PG (ref 26.8–34.3)
MCHC RBC AUTO-ENTMCNC: 32.3 G/DL (ref 31.4–37.4)
MCV RBC AUTO: 91 FL (ref 82–98)
PLATELET # BLD AUTO: 204 THOUSANDS/UL (ref 149–390)
PMV BLD AUTO: 10.3 FL (ref 8.9–12.7)
POTASSIUM SERPL-SCNC: 3.8 MMOL/L (ref 3.5–5.3)
RBC # BLD AUTO: 4.34 MILLION/UL (ref 3.88–5.62)
SODIUM SERPL-SCNC: 135 MMOL/L (ref 135–147)
WBC # BLD AUTO: 13.17 THOUSAND/UL (ref 4.31–10.16)

## 2024-07-24 PROCEDURE — 85014 HEMATOCRIT: CPT | Performed by: PHYSICIAN ASSISTANT

## 2024-07-24 PROCEDURE — 74240 X-RAY XM UPR GI TRC 1CNTRST: CPT

## 2024-07-24 PROCEDURE — 85027 COMPLETE CBC AUTOMATED: CPT | Performed by: PHYSICIAN ASSISTANT

## 2024-07-24 PROCEDURE — 99024 POSTOP FOLLOW-UP VISIT: CPT | Performed by: PHYSICIAN ASSISTANT

## 2024-07-24 PROCEDURE — 80048 BASIC METABOLIC PNL TOTAL CA: CPT | Performed by: PHYSICIAN ASSISTANT

## 2024-07-24 PROCEDURE — 99254 IP/OBS CNSLTJ NEW/EST MOD 60: CPT | Performed by: INTERNAL MEDICINE

## 2024-07-24 PROCEDURE — 85018 HEMOGLOBIN: CPT | Performed by: PHYSICIAN ASSISTANT

## 2024-07-24 RX ORDER — FUROSEMIDE 10 MG/ML
40 INJECTION INTRAMUSCULAR; INTRAVENOUS ONCE
Status: COMPLETED | OUTPATIENT
Start: 2024-07-24 | End: 2024-07-24

## 2024-07-24 RX ORDER — ACETAMINOPHEN 160 MG/5ML
975 SUSPENSION ORAL EVERY 8 HOURS
Qty: 638.4 ML | Refills: 0 | Status: SHIPPED | OUTPATIENT
Start: 2024-07-24 | End: 2024-07-31

## 2024-07-24 RX ORDER — RANOLAZINE 500 MG/1
1000 TABLET, EXTENDED RELEASE ORAL 2 TIMES DAILY
Status: DISCONTINUED | OUTPATIENT
Start: 2024-07-24 | End: 2024-07-24 | Stop reason: HOSPADM

## 2024-07-24 RX ADMIN — GABAPENTIN 800 MG: 400 CAPSULE ORAL at 11:54

## 2024-07-24 RX ADMIN — CLOPIDOGREL BISULFATE 75 MG: 75 TABLET ORAL at 08:40

## 2024-07-24 RX ADMIN — IOHEXOL 40 ML: 350 INJECTION, SOLUTION INTRAVENOUS at 10:04

## 2024-07-24 RX ADMIN — EMPAGLIFLOZIN 10 MG: 10 TABLET, FILM COATED ORAL at 11:54

## 2024-07-24 RX ADMIN — FUROSEMIDE 40 MG: 10 INJECTION, SOLUTION INTRAMUSCULAR; INTRAVENOUS at 08:33

## 2024-07-24 RX ADMIN — BUSPIRONE HYDROCHLORIDE 5 MG: 5 TABLET ORAL at 08:40

## 2024-07-24 RX ADMIN — RANOLAZINE 1000 MG: 500 TABLET, EXTENDED RELEASE ORAL at 11:54

## 2024-07-24 RX ADMIN — GABAPENTIN 800 MG: 400 CAPSULE ORAL at 08:40

## 2024-07-24 RX ADMIN — FAMOTIDINE 20 MG: 10 INJECTION, SOLUTION INTRAVENOUS at 09:00

## 2024-07-24 RX ADMIN — ACETAMINOPHEN 1000 MG: 10 INJECTION INTRAVENOUS at 00:17

## 2024-07-24 RX ADMIN — BACLOFEN 10 MG: 10 TABLET ORAL at 08:40

## 2024-07-24 RX ADMIN — SODIUM CHLORIDE, SODIUM LACTATE, POTASSIUM CHLORIDE, AND CALCIUM CHLORIDE 100 ML/HR: .6; .31; .03; .02 INJECTION, SOLUTION INTRAVENOUS at 00:17

## 2024-07-24 RX ADMIN — GABAPENTIN 800 MG: 400 CAPSULE ORAL at 00:17

## 2024-07-24 RX ADMIN — BACLOFEN 10 MG: 10 TABLET ORAL at 00:17

## 2024-07-24 RX ADMIN — ACETAMINOPHEN 1000 MG: 10 INJECTION INTRAVENOUS at 06:00

## 2024-07-24 RX ADMIN — TRAZODONE HYDROCHLORIDE 100 MG: 100 TABLET ORAL at 02:24

## 2024-07-24 RX ADMIN — ASPIRIN 81 MG CHEWABLE TABLET 81 MG: 81 TABLET CHEWABLE at 08:40

## 2024-07-24 NOTE — CONSULTS
[facility]  Consult  Name: Aristeo Hall 63 y.o. male I MRN: [id number]  Unit/Bed#: E5 -01 I Date of Admission: 7/23/2024   Date of Service: 7/24/2024  Hospital Day: 0    Inpatient consult to Internal Medicine  Consult performed by: Triston Swann DO  Consult ordered by: Destinee Robins PA-C      Assessment & Plan   * S/P repair of paraesophageal hernia  Assessment & Plan  History of bariatric surgery hypertension diastolic CHF CAD TIA/CVA mood disorder who presented to the hospital for elective paraesophageal hernia repair.  Patient doing well postoperatively.  Medications restarted.  Recheck hemoglobin drawn.  Disposition if hemoglobin stable medically stable for discharge without changes to outpatient regimen.    Anxiety and depression  Assessment & Plan  Mood stable.  Continue buspirone duloxetine and gabapentin    Type 2 diabetes mellitus with hyperglycemia, without long-term current use of insulin ([facility])  Assessment & Plan    Lab Results   Component Value Date    HGBA1C 6.6 (H) 04/05/2024     Results from last 7 days   Lab Units 07/24/24  0559   GLUCOSE RANDOM mg/dL 140     Follows with endocrinology as an outpatient.  Has a Dexcom.    COPD (chronic obstructive pulmonary disease) ([facility])  Assessment & Plan  Follows with pulmonology as an outpatient.  Continue inhalers postdischarge as previously taken    History of CVA (cerebrovascular accident)  Assessment & Plan  Reported history of CVA.  On DAPT and atorvastatin.    Morbid obesity ([facility])  Assessment & Plan  Body mass index is 40.01 kg/m².  Patient is status post bariatric surgery    Coronary artery disease of native artery of native heart with stable angina pectoris (HCC)  Assessment & Plan  CAD with history of PCI follows with cardiology.  No chest pain.  Continue DAPT and Ranexa.    Chronic diastolic congestive heart failure (HCC)  Assessment & Plan  Wt Readings from Last 3 Encounters:   07/23/24 134 kg (295 lb)    07/12/24 134 kg (296 lb 8 oz)   07/11/24 134 kg (296 lb 8 oz)     Compensated.  Continue furosemide as previously taken.  GDMT.  Losartan carvedilol and jardiance    VTE Prophylaxis:   Low Risk (Score 0-2) - Encourage Ambulation.    Mobility:   Basic Mobility Inpatient Raw Score: 18  JH-HLM Goal: 6: Walk 10 steps or more  JH-HLM Achieved: 6: Walk 10 steps or more  JH-HLM Goal achieved. Continue to encourage appropriate mobility.    Recommendations for Discharge:    Total Time Spent on Date of Encounter in care of patient: This time was spent on one or more of the following: performing physical exam; counseling and coordination of care; obtaining or reviewing history; documenting in the medical record; reviewing/ordering tests, medications or procedures; communicating with other healthcare professionals and discussing with patient's family/caregivers.    Collaboration of Care: Were Recommendations Directly Discussed with Primary Treatment Team? Yes    History of Present Illness:  Aristeo Hall is a 63 y.o. male with a past medical history of mood disorder TIA CAD status post PCI diastolic CHF diabetes hypertension and bariatric surgery who presents for elective paraesophageal hernia repair.  Postprocedure he is doing well.  He underwent upper GI this morning.  He denies any chest pain shortness of breath.  He is being treated very well here he reports and is pleased with his care.  He is asking about timing of discharge.  Outpatient records extensively reviewed due to significant past medical history.    Review of Systems:  Review of Systems   Constitutional:  Negative for chills and fever.   HENT:  Negative for facial swelling.    Eyes:  Negative for visual disturbance.   Respiratory:  Negative for shortness of breath.    Cardiovascular:  Negative for chest pain and palpitations.   Gastrointestinal:  Negative for abdominal distention, abdominal pain, diarrhea, nausea and vomiting.   Genitourinary:   Negative for hematuria.   Musculoskeletal:  Negative for back pain and myalgias.   Skin:  Negative for rash.   Neurological:  Negative for seizures and speech difficulty.   Psychiatric/Behavioral:  The patient is not nervous/anxious.    All other systems reviewed and are negative.      Past Medical and Surgical History:   Past Medical History:   Diagnosis Date    Acute on chronic diastolic congestive heart failure (HCC)     Altered gait     Alzheimer disease ([facility])     per patients,,early onset     Angina pectoris ([facility])     Anxiety     Arthritis     Brain aneurysm     coils placed    Cardiac disease     Chest pain 01/13/2016    Chronic kidney disease     Chronic pain     back/ right groin and rle- has morphine pump    Constipation     COPD (chronic obstructive pulmonary disease) ([facility])     Coronary artery disease     Decubital ulcer     sacral decub-occured 5/2019-sees wound care/debide in OR today 6/6/2019    Dependent on walker for ambulation     w/c for long distance    Depression     Diabetes mellitus ([facility])     insulin dependent -- pt states no longer dependent on insulin    Difficulty walking     Dizziness     occ    Dysphagia     Enlarged prostate     Esophageal stricture In file    Esophageal varices ([facility])     Fall     Fall at home 05/03/2019    GERD (gastroesophageal reflux disease)     Heart failure ([facility])     Hiatal hernia     Hx of gastric bypass 11/19/2018    states Nov 2019    Hypercholesterolemia     Hypertension     Ingrown toenail     MI (myocardial infarction) ([facility])     2017- stents x2    Migraine     Morbid obesity ([facility])     gastric bypass sleeve 11/2018-wt loss 125 lb    Myocardial infarction ([facility]) 2003    Neuropathy     Obesity 2003    Oxygen dependent     Q HS  2LPM with CPAP and prn during day 2-3 LPM  ( pt states no longer needs oxygen )    Pressure injury of skin     Pressure injury of skin of sacral region 06/03/2019    Added automatically from request for surgery [id number]    Renal disorder      Shortness of breath     Skin abnormality     sacral wound - covered with pad    Sleep apnea     couldnt tolerate CPAP    Stented coronary artery     Stroke ([facility])     vision loss b/l  2005, residual R leg weakness    Type 2 diabetes mellitus with diabetic neuropathy, with long-term current use of insulin ([facility]) 10/18/2019    Type 2 diabetes mellitus with renal complication ([facility])     insulin dependent    Type 2 diabetes mellitus, with long-term current use of insulin ([facility]) 10/11/2017    Transitioned From: Diabetes mellitus type 2, uncontrolled    Urinary frequency     Use of cane as ambulatory aid     Wears dentures     Wears glasses     Wears glasses     Wheelchair dependent     states uses walker & cane     Past Surgical History:   Procedure Laterality Date    BACK SURGERY      BRAIN SURGERY      CARDIAC CATHETERIZATION      with stents    CARDIAC CATHETERIZATION N/A 8/18/2023    Procedure: Cardiac Coronary Angiogram;  Surgeon: Horacio Weiner MD;  Location: [facility];  Service: Cardiology    CEREBRAL ANEURYSM REPAIR      with coils    COLONOSCOPY      ESOPHAGOGASTRODUODENOSCOPY N/A 7/1/2016    Procedure: ESOPHAGOGASTRODUODENOSCOPY (EGD);  Surgeon: Reno Christiansen MD;  Location: [facility];  Service:     GASTRIC BYPASS  11/19/2018    HERNIA REPAIR      HIATAL HERNIA REPAIR      INFUSION PUMP IMPLANTATION Left     morphine    INTRATHECAL PUMP IMPLANTATION Left 7/9/2020    Procedure: REVISION INTRATHECAL PAIN PUMP POCKET, LEFT ABDOMEN;  Surgeon: Homero Cho MD;  Location: [facility];  Service: Neurosurgery    KNEE ARTHROSCOPY Right     KNEE ARTHROSCOPY Right     PERONEAL NERVE DECOMPRESSION Right     GA DEBRIDEMENT OPEN WOUND FIRST 20 SQ CM/< N/A 6/6/2019    Procedure: EXCISIONAL DEBRIDEMENT OF SACRAL DECUBITUS ULCER;  Surgeon: Siddhartha Omer MD;  Location: [facility];  Service: General    GA ESOPHAGOGASTRODUODENOSCOPY TRANSORAL DIAGNOSTIC N/A 2/27/2017    Procedure: ESOPHAGOGASTRODUODENOSCOPY (EGD);  Surgeon:  Reno Christiansen MD;  Location: [facility];  Service: Gastroenterology    CA ESOPHAGOGASTRODUODENOSCOPY TRANSORAL DIAGNOSTIC N/A 2018    Procedure: ESOPHAGOGASTRODUODENOSCOPY (EGD) with biopsy;  Surgeon: Reno Christiansen MD;  [facility]: [facility];  Service: Gastroenterology    CA IMPLTJ/RPLCMT ITHCL/EDRL DRUG NFS PRGRBL PUMP Left 10/13/2020    Procedure: EXPLORATION OF INTRATHECAL PAIN PUMP SYSTEM INTEGRITY FOR POSSIBLE REPLACEMENT OF CATHETER AND PUMP.;  Surgeon: Homero Cho MD;  Location: [facility];  Service: Neurosurgery    CA IMPLTJ/RPLCMT ITHCL/EDRL DRUG NFS SUBQ RSVR N/A 2017    Procedure: REMOVAL / EXCHANGE INTRATHECAL PAIN PUMP;  Surgeon: Que Leonard MD;  [facility];  Service: Orthopedics    CA IMPLTJ/RPLCMT ITHCL/EDRL DRUG NFS SUBQ RSVR N/A 2016    Procedure: REPLACEMENT AND PROGRAM PUMP ;  Surgeon: Que Leonard MD;  [facility];  Service: Orthopedics    CA PRQ IMPLTJ NSTIM ELECTRODE ARRAY EPIDURAL Right 3/17/2021    Procedure: INSERTION THORACIC DORSAL COLUMN SPINAL CORD STIMULATOR PERCUTANEOUS W IMPLANTABLE PULSE GENERATOR, RIGHT;  Surgeon: Homero Cho MD;  Location: [facility];  Service: Neurosurgery       Meds/Allergies:  Prior to Admission Medications   Prescriptions Last Dose Informant Patient Reported? Taking?   CALCIUM PO 2024 Self Yes Yes   Sig: Take 1 tablet by mouth daily   Continuous Blood Gluc Sensor (Dexcom G6 Sensor) MISC Not Taking Self No No   Sig: 3 PACK SENSOR FOR CONTINUOUS GLUCOSE MONITORING   Patient not taking: Reported on 2024   Continuous Blood Gluc Transmit (Dexcom G6 Transmitter) MISC Unknown Self No No   Si TRANSMITTED EVERY 3 MONTHS FOR CONTINUOUS GLUCOSE MONITORING   DULoxetine (Cymbalta) 30 mg delayed release capsule Past Week  No Yes   Sig: Take 1 capsule (30 mg total) by mouth daily   Empagliflozin (JARDIANCE) 10 MG TABS tablet Past Week Self No Yes   Sig: Take 1 tablet (10 mg total) by mouth every morning    Fluticasone-Salmeterol (Advair Diskus) 500-50 mcg/dose inhaler 7/22/2024 at 2000 Self No Yes   Sig: Inhale 1 puff 2 (two) times a day Rinse mouth after use   albuterol (2.5 mg/3 mL) 0.083 % nebulizer solution Past Week  No Yes   Sig: USE 1 VIAL IN NEBULIZER EVERY 6 HOURS AS NEEDED FOR WHEEZING OR SHORTNESS OF BREATH   amLODIPine (NORVASC) 5 mg tablet 7/22/2024  No Yes   Sig: Take 1 tablet (5 mg total) by mouth daily   ammonium lactate (LAC-HYDRIN) 12 % cream Unknown Self No No   Sig: Apply topically as needed for dry skin   aspirin 81 mg chewable tablet 7/22/2024 Self No Yes   Sig: Chew 1 tablet (81 mg total) daily   atorvastatin (LIPITOR) 80 mg tablet 7/22/2024  No Yes   Sig: Take 1 tablet (80 mg total) by mouth daily after dinner   baclofen 10 mg tablet 7/22/2024 Self Yes Yes   Sig: Take 10 mg by mouth 3 (three) times a day   busPIRone (BUSPAR) 5 mg tablet 7/22/2024 at 2000 Self Yes Yes   Sig: Take 5 mg by mouth 2 (two) times a day   carvedilol (COREG) 12.5 mg tablet 7/22/2024 at 2000 Self No Yes   Sig: TAKE 1 TABLET BY MOUTH TWICE A DAY WITH FOOD   clopidogrel (PLAVIX) 75 mg tablet Past Month Self No Yes   Sig: Take 1 tablet (75 mg total) by mouth daily   dexamethasone (DECADRON) 2 mg tablet 7/22/2024 at 0800  No Yes   Sig: Take 1 tablet (2 mg total) by mouth daily with breakfast   docusate sodium (COLACE) 100 mg capsule 7/22/2024 Self No Yes   Sig: Take 1 capsule (100 mg total) by mouth 2 (two) times a day   ergocalciferol (VITAMIN D2) 50,000 units Past Week Self No Yes   Sig: Take 1 capsule (50,000 Units total) by mouth once a week   folic acid (FOLVITE) 1 mg tablet 7/22/2024 at 0500 Self No Yes   Sig: Take 1 tablet (1 mg total) by mouth daily   furosemide (LASIX) 40 mg tablet 7/22/2024 at 0800 Self No Yes   Sig: Take once in AM daily. Take once daily in PM PRN weight gain, edema.   gabapentin (NEURONTIN) 300 mg capsule 7/23/2024  Yes Yes   Sig: Take 300 mg by mouth 3 (three) times a day as needed    gabapentin (NEURONTIN) 800 mg tablet 7/23/2024 at 0615 Self Yes Yes   Sig: Take 800 mg by mouth 4 (four) times a day   hydrocortisone 1 % lotion Past Week Self No Yes   Sig: Apply topically if needed for rash   lidocaine (LIDODERM) 5 % More than a month Self Yes No   Sig: Apply 1 patch topically daily back   losartan (COZAAR) 50 mg tablet 7/22/2024 Self No Yes   Sig: TAKE 1 TABLET BY MOUTH EVERY DAY   methocarbamol (ROBAXIN) 750 mg tablet 7/22/2024 Self No Yes   Sig: TAKE 1 TABLET (750 MG TOTAL) BY MOUTH EVERY 6 (SIX) HOURS AS NEEDED FOR MUSCLE SPASMS   naloxegol oxalate (MOVANTIK) 25 MG tablet 7/23/2024 at 0615 Self No Yes   Sig: Take 1 tablet (25 mg total) by mouth daily in the early morning   nitroglycerin (NITROSTAT) 0.4 mg SL tablet   No Yes   Sig: PLACE 1 TABLET (0.4 MG TOTAL) UNDER THE TONGUE EVERY 5 (FIVE) MINUTES AS NEEDED FOR CHEST PAIN (PT HAS NOT USED RECENTLY)   nystatin (MYCOSTATIN) cream 7/22/2024 Self No Yes   Sig: Apply 2 g (1 Application total) topically 2 (two) times a day   omeprazole (PriLOSEC) 20 mg delayed release capsule 7/22/2024 at 0800  No Yes   Sig: Take 1 capsule (20 mg total) by mouth daily Do not start before July 24, 2024.   ondansetron (ZOFRAN) 4 mg tablet Past Month Self No Yes   Sig: Take 1 tablet (4 mg total) by mouth every 8 (eight) hours as needed for nausea or vomiting   potassium chloride (Klor-Con M20) 20 mEq tablet 7/22/2024  No Yes   Sig: Take 1 tablet (20 mEq total) by mouth daily   ranolazine (RANEXA) 1000 MG SR tablet 7/22/2024 at 2000 Self No Yes   Sig: TAKE 1 TABLET (1,000 MG TOTAL) BY MOUTH EVERY 12 HOURS   tamsulosin (FLOMAX) 0.4 mg 7/22/2024 Self No Yes   Sig: TAKE 1 CAPSULE BY MOUTH EVERY DAY WITH DINNER   traZODone (DESYREL) 100 mg tablet 7/22/2024 at 0800 Self No Yes   Sig: Take 1 tablet (100 mg total) by mouth daily at bedtime   vitamin B-12 (VITAMIN B-12) 1,000 mcg tablet 7/22/2024 at 0800 Self No Yes   Sig: Take 1 tablet (1,000 mcg total) by mouth daily       Facility-Administered Medications: None       Allergies: No Known Allergies    Social History:  Social History     Socioeconomic History    Marital status: /Civil Union     Spouse name: Reva    Number of children: 5    Years of education: None    Highest education level: GED or equivalent   Occupational History    None   Tobacco Use    Smoking status: Never    Smokeless tobacco: Never   Vaping Use    Vaping status: Never Used   Substance and Sexual Activity    Alcohol use: Not Currently    Drug use: Not Currently     Types: Marijuana     Comment: Medical card is for CBD cream . states no THC use    Sexual activity: Not Currently     Partners: Female   Other Topics Concern    None   Social History Narrative    None     Social Determinants of Health     Financial Resource Strain: High Risk (9/19/2023)    Overall Financial Resource Strain (CARDIA)     Difficulty of Paying Living Expenses: Very hard   Food Insecurity: Food Insecurity Present (3/29/2024)    Hunger Vital Sign     Worried About Running Out of Food in the Last Year: Sometimes true     Ran Out of Food in the Last Year: Sometimes true   Transportation Needs: No Transportation Needs (3/29/2024)    PRAPARE - Transportation     Lack of Transportation (Medical): No     Lack of Transportation (Non-Medical): No   Physical Activity: Inactive (9/6/2019)    Exercise Vital Sign     Days of Exercise per Week: 0 days     Minutes of Exercise per Session: 0 min   Stress: Stress Concern Present (9/6/2019)    [employer] - Occupational Stress Questionnaire     Feeling of Stress : Very much   Social Connections: Moderately Isolated (9/6/2019)    Social Connection and Isolation Panel [NHANES]     Frequency of Communication with Friends and Family: Never     Frequency of Social Gatherings with Friends and Family: Twice a week     Attends Restorationist Services: 1 to 4 times per year     Active Member of Clubs or Organizations: No     Attends  Club or Organization Meetings: Never     Marital Status:    Intimate Partner Violence: Not At Risk (9/6/2019)    Humiliation, Afraid, Rape, and Kick questionnaire     Fear of Current or Ex-Partner: No     Emotionally Abused: No     Physically Abused: No     Sexually Abused: No   Housing Stability: Low Risk  (3/29/2024)    Housing Stability Vital Sign     Unable to Pay for Housing in the Last Year: No     Number of Times Moved in the Last Year: 1     Homeless in the Last Year: No        Family History:  Family History   Problem Relation Age of Onset    Diabetes unspecified Mother     Diabetes Mother     Heart attack Father     Heart disease Father     Hypertension Father     Stroke Father     Diabetes unspecified Brother     Depression Brother     Mental illness Brother     COPD Brother     Drug abuse Brother     Bipolar disorder Brother     Diabetes unspecified Brother     Diabetes unspecified Maternal Grandmother     Diabetes unspecified Paternal Grandmother     Diabetes unspecified Paternal Uncle     ADD / ADHD Cousin     Colon cancer Neg Hx     Colon polyps Neg Hx         Physical Exam:   Vitals:   Blood Pressure: 128/72 (07/24/24 0831)  Pulse: (!) 52 (07/24/24 0831)  Temperature: 98.2 °F (36.8 °C) (07/24/24 0720)  Temp Source: Oral (07/24/24 0720)  Respirations: 18 (07/24/24 0720)  Height: 6' (182.9 cm) (07/23/24 1612)  Weight - Scale: 134 kg (295 lb) (07/23/24 1612)  SpO2: 93 % (07/24/24 0831)    Physical Exam  Vitals reviewed.   Constitutional:       General: He is not in acute distress.     Appearance: He is obese.   HENT:      Head: Atraumatic.   Eyes:      General: No scleral icterus.  Cardiovascular:      Rate and Rhythm: Regular rhythm.      Heart sounds: Normal heart sounds.   Pulmonary:      Effort: Pulmonary effort is normal.      Breath sounds: Decreased breath sounds present. No wheezing.   Abdominal:      General: Bowel sounds are normal.      Palpations: Abdomen is soft.      Tenderness:  There is no abdominal tenderness. There is no rebound.   Musculoskeletal:      Cervical back: Normal range of motion.      Right lower leg: Edema present.      Left lower leg: Edema present.   Skin:     General: Skin is warm and dry.      Comments: Abdominal incisions intact   Neurological:      General: No focal deficit present.      Mental Status: He is alert and oriented to person, place, and time.      Cranial Nerves: No cranial nerve deficit.   Psychiatric:         Mood and Affect: Mood normal.          Additional Data:   Lab Results: I have personally reviewed pertinent reports.    Results from last 7 days   Lab Units 07/24/24  1037 07/24/24  0559   WBC Thousand/uL  --  13.17*   HEMOGLOBIN g/dL 13.3 12.8   HEMATOCRIT % 40.5 39.6   MCV fL  --  91   PLATELETS Thousands/uL  --  204           Results from last 7 days   Lab Units 07/24/24  0559   SODIUM mmol/L 135   POTASSIUM mmol/L 3.8   CHLORIDE mmol/L 103   CO2 mmol/L 26   ANION GAP mmol/L 6   BUN mg/dL 13   CREATININE mg/dL 1.06   CALCIUM mg/dL 8.5   EGFR ml/min/1.73sq m 75   GLUCOSE RANDOM mg/dL 140              Results from last 7 days   Lab Units 07/23/24  1338 07/23/24  0842   POC GLUCOSE mg/dl 168* 151*             Imaging: I have personally reviewed pertinent films in PACS  FL upper GI UGI    Result Date: 7/24/2024  Impression: Expected, unremarkable appearance status post LINX procedure without evidence of leak. Grade 1-2 esophageal stasis. Grossly stable appearance of the stomach status post sleeve gastrectomy. Workstation performed: [id number]         EKG, Pathology, and Other Studies Reviewed on Admission:   EKG:     ** Please Note: This note may have been constructed using a voice recognition system. **

## 2024-07-24 NOTE — ASSESSMENT & PLAN NOTE
History of bariatric surgery hypertension diastolic CHF CAD TIA/CVA mood disorder who presented to the hospital for elective paraesophageal hernia repair.  Patient doing well postoperatively.  Medications restarted.  Recheck hemoglobin drawn.  Disposition if hemoglobin stable medically stable for discharge without changes to outpatient regimen.

## 2024-07-24 NOTE — DISCHARGE SUMMARY
Discharge Summary - Aristeo Hall 63 y.o. male MRN: [id number]    Unit/Bed#: E5 -01 Encounter: [id number]      Pre-Operative Diagnosis: Pre-Op Diagnosis Codes:      * Hiatal hernia [K44.9]     * Esophageal reflux [K21.9]     * Swallowing difficulty [R13.10]     * Gastroesophagitis [K29.70, K20.90]     * Regurgitation and rechewing [R11.10]     * ARLEEN on CPAP [G47.33]     * Diabetes mellitus (HCC) [E11.9]    Post-Operative Diagnosis: Post-Op Diagnosis Codes:     * Hiatal hernia [K44.9]     * Esophageal reflux [K21.9]     * Swallowing difficulty [R13.10]     * Gastroesophagitis [K29.70, K20.90]     * Regurgitation and rechewing [R11.10]     * ARLEEN on CPAP [G47.33]     * Diabetes mellitus (HCC) [E11.9]    Procedures Performed:  Procedure(s):  REPAIR HERNIA PARAESOPHAGEAL  W ROBOTICS & MAGNETIC SPINCTER AUGMENTATION DEVISE    Surgeon: Mansi Mauro MD    See H & P for full details of admission and Operative Note for full details of operations performed.     Patient tolerated surgery well without complications. In the morning postoperative Day 1, the patient had no nausea or abdominal pain. UGI obtained and normal in findings. Tolerated a clear liquid diet without vomiting then transitioned to soft.  Able to ambulate and voiding independently. Patient was deemed ready for discharge home restarting Plavix and ASA. SLIM consulted for home medication management.     Patient was seen and examined prior to discharge.      Provisions for Follow-Up Care:  See After Visit Summary/Discharge Instructions for information related to follow-up care and home orders.      Disposition: Home, in stable condition.     Planned Readmission: No    Discharge Medications:  See After Visit Summary/Discharge Instructions for reconciled discharge medications provided to patient and family.      Post Operative instructions: Reviewed with patient and/or family.    Signature:   Destinee Robins PA-C  Date: 7/24/2024 Time: 11:07 AM

## 2024-07-24 NOTE — ASSESSMENT & PLAN NOTE
Lab Results   Component Value Date    HGBA1C 6.6 (H) 04/05/2024     Results from last 7 days   Lab Units 07/24/24  0559   GLUCOSE RANDOM mg/dL 140     Follows with endocrinology as an outpatient.  Has a Dexcom.

## 2024-07-24 NOTE — ASSESSMENT & PLAN NOTE
Wt Readings from Last 3 Encounters:   07/23/24 134 kg (295 lb)   07/12/24 134 kg (296 lb 8 oz)   07/11/24 134 kg (296 lb 8 oz)     Compensated.  Continue furosemide as previously taken.  GDMT.  Losartan carvedilol and jardiance

## 2024-07-24 NOTE — PLAN OF CARE
Problem: Prexisting or High Potential for Compromised Skin Integrity  Goal: Skin integrity is maintained or improved  Description: INTERVENTIONS:  - Identify patients at risk for skin breakdown  - Assess and monitor skin integrity  - Assess and monitor nutrition and hydration status  - Monitor labs   - Assess for incontinence   - Turn and reposition patient  - Assist with mobility/ambulation  - Relieve pressure over bony prominences  - Avoid friction and shearing  - Provide appropriate hygiene as needed including keeping skin clean and dry  - Evaluate need for skin moisturizer/barrier cream  - Collaborate with interdisciplinary team   - Patient/family teaching  - Consider wound care consult   Outcome: Progressing     Problem: PAIN - ADULT  Goal: Verbalizes/displays adequate comfort level or baseline comfort level  Description: Interventions:  - Encourage patient to monitor pain and request assistance  - Assess pain using appropriate pain scale  - Administer analgesics based on type and severity of pain and evaluate response  - Implement non-pharmacological measures as appropriate and evaluate response  - Consider cultural and social influences on pain and pain management  - Notify physician/advanced practitioner if interventions unsuccessful or patient reports new pain  Outcome: Progressing     Problem: INFECTION - ADULT  Goal: Absence or prevention of progression during hospitalization  Description: INTERVENTIONS:  - Assess and monitor for signs and symptoms of infection  - Monitor lab/diagnostic results  - Monitor all insertion sites, i.e. indwelling lines, tubes, and drains  - Monitor endotracheal if appropriate and nasal secretions for changes in amount and color  - [place]ushing appropriate cooling/warming therapies per order  - Administer medications as ordered  - Instruct and encourage patient and family to use good hand hygiene technique  - Identify and instruct in appropriate isolation precautions for  identified infection/condition  Outcome: Progressing     Problem: SAFETY ADULT  Goal: Patient will remain free of falls  Description: INTERVENTIONS:  - Educate patient/family on patient safety including physical limitations  - Instruct patient to call for assistance with activity   - Consult OT/PT to assist with strengthening/mobility   - Keep Call bell within reach  - Keep bed low and locked with side rails adjusted as appropriate  - Keep care items and personal belongings within reach  - Initiate and maintain comfort rounds  - Make Fall Risk Sign visible to staff  - Apply yellow socks and bracelet for high fall risk patients  - Consider moving patient to room near nurses station  Outcome: Progressing     Problem: DISCHARGE PLANNING  Goal: Discharge to home or other facility with appropriate resources  Description: INTERVENTIONS:  - Identify barriers to discharge w/patient and caregiver  - Arrange for needed discharge resources and transportation as appropriate  - Identify discharge learning needs (meds, wound care, etc.)  - Arrange for interpretive services to assist at discharge as needed  - Refer to Case Management Department for coordinating discharge planning if the patient needs post-hospital services based on physician/advanced practitioner order or complex needs related to functional status, cognitive ability, or social support system  Outcome: Progressing     Problem: Knowledge Deficit  Goal: Patient/family/caregiver demonstrates understanding of disease process, treatment plan, medications, and discharge instructions  Description: Complete learning assessment and assess knowledge base.  Interventions:  - Provide teaching at level of understanding  - Provide teaching via preferred learning methods  Outcome: Progressing     Problem: RESPIRATORY - ADULT  Goal: Achieves optimal ventilation and oxygenation  Description: INTERVENTIONS:  - Assess for changes in respiratory status  - Assess for changes in  mentation and behavior  - Position to facilitate oxygenation and minimize respiratory effort  - Oxygen administered by appropriate delivery if ordered  - Initiate smoking cessation education as indicated  - Encourage broncho-pulmonary hygiene including cough, deep breathe, Incentive Spirometry  - Assess the need for suctioning and aspirate as needed  - Assess and instruct to report SOB or any respiratory difficulty  - Respiratory Therapy support as indicated  Outcome: Progressing     Problem: GASTROINTESTINAL - ADULT  Goal: Minimal or absence of nausea and/or vomiting  Description: INTERVENTIONS:  - Administer IV fluids if ordered to ensure adequate hydration  - Maintain NPO status until nausea and vomiting are resolved  - Nasogastric tube if ordered  - Administer ordered antiemetic medications as needed  - Provide nonpharmacologic comfort measures as appropriate  - Advance diet as tolerated, if ordered  - Consider nutrition services referral to assist patient with adequate nutrition and appropriate food choices  Outcome: Progressing     Problem: GENITOURINARY - ADULT  Goal: Absence of urinary retention  Description: INTERVENTIONS:  - Assess patient’s ability to void and empty bladder  - Monitor I/O  - Bladder scan as needed  - Discuss with physician/AP medications to alleviate retention as needed  - Discuss catheterization for long term situations as appropriate  Outcome: Progressing

## 2024-07-24 NOTE — NURSING NOTE
Discharge instructions reviewed with pt. He is aware of medication changes and prescriptions to be picked up. Pt has no questions or concerns, family is providing ride home.

## 2024-07-25 ENCOUNTER — TELEPHONE (OUTPATIENT)
Dept: MEDSURG UNIT | Facility: HOSPITAL | Age: 64
End: 2024-07-25

## 2024-07-25 NOTE — TELEPHONE ENCOUNTER
Post op follow up phone call completed.  Pt is tolerating soft diet without difficulty.  Using IS as instructed, reinforced importance of using IS to help prevent pneumonia. Ambulating about home without difficulty.  Minimal pain, not using Oxycodone.  Reaffirmed examples of lsoft diet over the next 6 weeks.  Passing gas, no BM yet.  Pt states he has been incontinent of urine x'4 since being home.  Pt stated understanding about discharge instructions and medication adjustments.  Follow up appt with surgeon scheduled for next week.   Instructed to call with any additional questions or concerns.     Discussed incontinence with BARB Robins PA-C.  She stated pt should follow up with PCP as pt needed lasix to help him urinate while in hospital.  May still be effect of anesthesia.  May also need to see urology.  Pt informed of above and will call PCP today.

## 2024-07-25 NOTE — RESTORATIVE TECHNICIAN NOTE
Restorative Technician Note      Patient Name: Aristeo ATWOOD Rafael     Restorative Tech Visit Date: 07/25/24  Note Type: Mobility  Patient Position Upon Consult: Supine  Activity Performed: Ambulated; Range of motion  Assistive Device: Other (Comment) (Rollator)  Patient Position at End of Consult: All needs within reach; Bedside chair

## 2024-07-30 DIAGNOSIS — I21.3 STEMI (ST ELEVATION MYOCARDIAL INFARCTION) (HCC): ICD-10-CM

## 2024-07-31 RX ORDER — CARVEDILOL 12.5 MG/1
12.5 TABLET ORAL 2 TIMES DAILY WITH MEALS
Qty: 180 TABLET | Refills: 1 | Status: SHIPPED | OUTPATIENT
Start: 2024-07-31

## 2024-08-02 ENCOUNTER — CLINICAL SUPPORT (OUTPATIENT)
Dept: BARIATRICS | Facility: CLINIC | Age: 64
End: 2024-08-02

## 2024-08-02 ENCOUNTER — OFFICE VISIT (OUTPATIENT)
Dept: BARIATRICS | Facility: CLINIC | Age: 64
End: 2024-08-02

## 2024-08-02 VITALS
HEART RATE: 79 BPM | OXYGEN SATURATION: 98 % | WEIGHT: 292 LBS | BODY MASS INDEX: 37.47 KG/M2 | DIASTOLIC BLOOD PRESSURE: 88 MMHG | HEIGHT: 74 IN | SYSTOLIC BLOOD PRESSURE: 126 MMHG

## 2024-08-02 DIAGNOSIS — K21.9 GASTROESOPHAGEAL REFLUX DISEASE WITHOUT ESOPHAGITIS: ICD-10-CM

## 2024-08-02 DIAGNOSIS — K44.9 HIATAL HERNIA: ICD-10-CM

## 2024-08-02 DIAGNOSIS — Z98.84 BARIATRIC SURGERY STATUS: Primary | ICD-10-CM

## 2024-08-02 DIAGNOSIS — Z90.3 S/P GASTRIC SLEEVE PROCEDURE: ICD-10-CM

## 2024-08-02 DIAGNOSIS — Z87.19 S/P REPAIR OF PARAESOPHAGEAL HERNIA: ICD-10-CM

## 2024-08-02 DIAGNOSIS — Z98.890 S/P REPAIR OF PARAESOPHAGEAL HERNIA: ICD-10-CM

## 2024-08-02 DIAGNOSIS — E66.01 MORBID OBESITY (HCC): Primary | ICD-10-CM

## 2024-08-02 DIAGNOSIS — Z98.84 BARIATRIC SURGERY STATUS: ICD-10-CM

## 2024-08-02 PROCEDURE — RECHECK: Performed by: DIETITIAN, REGISTERED

## 2024-08-02 PROCEDURE — 99024 POSTOP FOLLOW-UP VISIT: CPT | Performed by: SURGERY

## 2024-08-02 NOTE — PROGRESS NOTES
Date of surgery: 07/23/2024  Procedure: PEHR/LINX  Performing surgeon:     Initial Weight - 303.5  Current Weight - 292.0  Total Body Weight Loss (EWL)- 41.4  EWL% - 29  TWB % - 12

## 2024-08-02 NOTE — PROGRESS NOTES
Weight Management Nutrition Class     (Previous Sleeve Gastrectomy 11/19/2018 at [facility] with Dr. Pritchard)    Diagnosis:       Hiatal hernia [K44.9]       Esophageal reflux [K21.9]       Swallowing difficulty [R13.10]       Gastroesophagitis [K29.70, K20.90]       Regurgitation and rechewing [R11.10]     Bariatric Surgeon: Dr. Mansi Mauro    Surgery: REPAIR HERNIA PARAESOPHAGEAL  W ROBOTICS & MAGNETIC SPINCTER AUGMENTATION DEVISE     Class: first post op note    Topics discussed today include:     Reviewed post-operative PEHR diet progression, full liquid diet for 7-10 days, then soft diet for 4-8 weeks.  Discussed soft diet choices, eating soft, moist foods, avoiding dry tough foods, using sauces or gravies to moisten foods, sipping liquids with meals to facilitate swallowing, cutting food into small bites or eating minced or ground meats, chewing well, eating slowly, remaining upright for 45-60 minutes after eating, do not eat for 3 hour prior to laying down for bed, eat six small meals per day.  Discussed avoiding acidic foods, very hot and very cold foods and liquids, and discussed ways to minimize gas and bloating and gas-producing foods to eliminate.     Reviewed post-operative LINX Diet progression with pt.  Reviewed eating six small meals per day, solid food, every two hours starting on day two post-operative.  Encouraged taking small bites, chewing well, sipping liquids with meals to facilitate swallowing.  Discussed post-operative dysphagia: Beginning 7-10 days after surgery, peaking by week six, ideally resolved by week 12 post-operative. Instructed pt to call office for fever over 100.4 degrees, increased abdominal pain, nausea and/or vomiting, persistent pain or difficulty swallowing or inability to swallow, or cough or difficulty breathing.  Provided pt with contact information for further dietary questions    Patient was able to verbalize basic diet (protein, fluid, vitamin and mineral) recommendations  and possible nutrition-related complications. Yes

## 2024-08-02 NOTE — PROGRESS NOTES
POST OP UP VISIT - BARIATRIC SURGERY  Aristeo Hall 63 y.o. male MRN: [id number]  Unit/Bed#:  Encounter: [id number]      HPI:  Aristeo Hall is a 63 y.o. male status post PEH repair with LINX performed by Dr. Brad Mauro on 7/23/24 returning to office for first post op visit since surgery.    Tolerating soft diet. Reports eating frequent small meals.  Denies nausea and vomiting.  Taking multivitamins. Stopped PPI. Denies reflux    Hx sleeve 2018    Review of Systems   Constitutional:  Negative for chills and fever.   HENT:  Negative for ear pain and sore throat.    Eyes:  Negative for pain and visual disturbance.   Respiratory:  Negative for cough and shortness of breath.    Cardiovascular:  Negative for chest pain and palpitations.   Gastrointestinal:  Negative for abdominal pain and vomiting.   Genitourinary:  Negative for dysuria and hematuria.   Musculoskeletal:  Negative for arthralgias and back pain.   Skin:  Negative for color change and rash.   Neurological:  Negative for seizures and syncope.   All other systems reviewed and are negative.      Historical Information   Past Medical History:   Diagnosis Date    Acute on chronic diastolic congestive heart failure (HCC)     Altered gait     Alzheimer disease (HCC)     per patients,,early onset     Angina pectoris (HCC)     Anxiety     Arthritis     Brain aneurysm     coils placed    Cardiac disease     Chest pain 01/13/2016    Chronic kidney disease     Chronic pain     back/ right groin and rle- has morphine pump    Constipation     COPD (chronic obstructive pulmonary disease) (HCC)     Coronary artery disease     Decubital ulcer     sacral decub-occured 5/2019-sees wound care/debide in OR today 6/6/2019    Dependent on walker for ambulation     w/c for long distance    Depression     Diabetes mellitus (HCC)     insulin dependent -- pt states no longer dependent on insulin    Difficulty walking     Dizziness     occ    Dysphagia     Enlarged  prostate     Esophageal stricture In file    Esophageal varices ([facility])     Fall     Fall at home 05/03/2019    GERD (gastroesophageal reflux disease)     Heart failure ([facility])     Hiatal hernia     Hx of gastric bypass 11/19/2018    states Nov 2019    Hypercholesterolemia     Hypertension     Ingrown toenail     MI (myocardial infarction) ([facility])     2017- stents x2    Migraine     Morbid obesity ([facility])     gastric bypass sleeve 11/2018-wt loss 125 lb    Myocardial infarction ([facility]) 2003    Neuropathy     Obesity 2003    Oxygen dependent     Q HS  2LPM with CPAP and prn during day 2-3 LPM  ( pt states no longer needs oxygen )    Pressure injury of skin     Pressure injury of skin of sacral region 06/03/2019    Added automatically from request for surgery [id number]    Renal disorder     Shortness of breath     Skin abnormality     sacral wound - covered with pad    Sleep apnea     couldnt tolerate CPAP    Stented coronary artery     Stroke ([facility])     vision loss b/l  2005, residual R leg weakness    Type 2 diabetes mellitus with diabetic neuropathy, with long-term current use of insulin ([facility]) 10/18/2019    Type 2 diabetes mellitus with renal complication ([facility])     insulin dependent    Type 2 diabetes mellitus, with long-term current use of insulin ([facility]) 10/11/2017    Transitioned From: Diabetes mellitus type 2, uncontrolled    Urinary frequency     Use of cane as ambulatory aid     Wears dentures     Wears glasses     Wears glasses     Wheelchair dependent     states uses walker & cane     Past Surgical History:   Procedure Laterality Date    BACK SURGERY      BRAIN SURGERY      CARDIAC CATHETERIZATION      with stents    CARDIAC CATHETERIZATION N/A 8/18/2023    Procedure: Cardiac Coronary Angiogram;  Surgeon: Horacio Weiner MD;  Location:  CARDIAC CATH LAB;  Service: Cardiology    CEREBRAL ANEURYSM REPAIR      with coils    COLONOSCOPY      ESOPHAGOGASTRODUODENOSCOPY N/A 7/1/2016    Procedure: ESOPHAGOGASTRODUODENOSCOPY (EGD);   Surgeon: Reno Christiansen MD;  Location: [facility];  Service:     GASTRIC BYPASS  11/19/2018    HERNIA REPAIR      HIATAL HERNIA REPAIR      INFUSION PUMP IMPLANTATION Left     morphine    INTRATHECAL PUMP IMPLANTATION Left 7/9/2020    Procedure: REVISION INTRATHECAL PAIN PUMP POCKET, LEFT ABDOMEN;  Surgeon: Homero Cho MD;  Location: [facility];  Service: Neurosurgery    KNEE ARTHROSCOPY Right     KNEE ARTHROSCOPY Right     PERONEAL NERVE DECOMPRESSION Right     WI DEBRIDEMENT OPEN WOUND FIRST 20 SQ CM/< N/A 6/6/2019    Procedure: EXCISIONAL DEBRIDEMENT OF SACRAL DECUBITUS ULCER;  Surgeon: Siddhartha Omer MD;  Location[facility];  Service: General    WI ESOPHAGOGASTRODUODENOSCOPY TRANSORAL DIAGNOSTIC N/A 2/27/2017    Procedure: ESOPHAGOGASTRODUODENOSCOPY (EGD);  Surgeon: Reno Christiansen MD;  Location: [facility];  Service: Gastroenterology    WI ESOPHAGOGASTRODUODENOSCOPY TRANSORAL DIAGNOSTIC N/A 8/23/2018    Procedure: ESOPHAGOGASTRODUODENOSCOPY (EGD) with biopsy;  Surgeon: Reno Christiansen MD;  [facility]: [facility];  Service: Gastroenterology    WI IMPLTJ/RPLCMT ITHCL/EDRL DRUG NFS PRGRBL PUMP Left 10/13/2020    Procedure: EXPLORATION OF INTRATHECAL PAIN PUMP SYSTEM INTEGRITY FOR POSSIBLE REPLACEMENT OF CATHETER AND PUMP.;  Surgeon: Homero Cho MD;  Location: [facility];  Service: Neurosurgery    WI IMPLTJ/RPLCMT ITHCL/EDRL DRUG NFS SUBQ RSVR N/A 1/19/2017    Procedure: REMOVAL / EXCHANGE INTRATHECAL PAIN PUMP;  Surgeon: Que Leonard MD;  [facility];  Service: Orthopedics    WI IMPLTJ/RPLCMT ITHCL/EDRL DRUG NFS SUBQ RSVR N/A 5/16/2016    Procedure: REPLACEMENT AND PROGRAM PUMP ;  Surgeon: Que Leonard MD;  [facility];  Service: Orthopedics    WI LAPS RPR PARAESPHGL HRNA INCL FUNDPLSTY W/MESH N/A 7/23/2024    Procedure: REPAIR HERNIA PARAESOPHAGEAL  W ROBOTICS & MAGNETIC SPINCTER AUGMENTATION DEVISE;  Surgeon: Mansi Mauro MD;  [facility];  Service: Bariatrics    WI PRQ IMPLTJ NSTIM  "ELECTRODE ARRAY EPIDURAL Right 3/17/2021    Procedure: INSERTION THORACIC DORSAL COLUMN SPINAL CORD STIMULATOR PERCUTANEOUS W IMPLANTABLE PULSE GENERATOR, RIGHT;  Surgeon: Homero Cho MD;  Location[facility];  Service: Neurosurgery     Social History   Social History     Substance and Sexual Activity   Alcohol Use Not Currently     Social History     Substance and Sexual Activity   Drug Use Not Currently    Types: Marijuana    Comment: Medical card is for CBD cream . states no THC use     Social History     Tobacco Use   Smoking Status Never   Smokeless Tobacco Never     Family History: non-contributory    Meds/Allergies   all medications and allergies reviewed  No Known Allergies    Objective       Current Vitals:   Blood Pressure: 126/88 (08/02/24 0912)  Pulse: 79 (08/02/24 0912)  Height: 6' 1.5\" (186.7 cm) (08/02/24 0912)  Weight - Scale: 132 kg (292 lb) (08/02/24 0912)  SpO2: 98 % (08/02/24 0912)      Invasive Devices       Line  Duration             Pump Device Pain pump Left Abdomen -- days                    Physical Exam  Constitutional:       Appearance: Normal appearance. He is obese.   HENT:      Head: Normocephalic and atraumatic.      Nose: Nose normal.      Mouth/Throat:      Mouth: Mucous membranes are moist.   Eyes:      Extraocular Movements: Extraocular movements intact.      Pupils: Pupils are equal, round, and reactive to light.   Cardiovascular:      Rate and Rhythm: Normal rate and regular rhythm.      Pulses: Normal pulses.      Heart sounds: Normal heart sounds.   Pulmonary:      Effort: Pulmonary effort is normal.      Breath sounds: Normal breath sounds.   Abdominal:      Palpations: Abdomen is soft.      Comments: Incisions healing well with no signs of infection or drainage.  Pain pump visible in lower quadrant   Musculoskeletal:      Cervical back: Normal range of motion.   Skin:     General: Skin is warm.   Neurological:      General: No focal deficit present.      Mental Status: He " is alert and oriented to person, place, and time.   Psychiatric:         Mood and Affect: Mood normal.         Behavior: Behavior normal.             Assessment/PLAN:    63 y.o. male status post PEH repair with LINX performed by Dr. Brad Mauro on 7/23/24 returning to office for first post op visit since surgery, doing well post op. No major issues and healing well.       Increase physical activity slowly as tolerated and instructed.  Advance diet as instructed by our dietitians today   No need to restart PPI    Follow up in 3 months.          Destinee Robins PA-C  Bariatric Surgery   8/2/2024  9:38 AM      .

## 2024-08-06 DIAGNOSIS — F33.1 MAJOR DEPRESSIVE DISORDER, RECURRENT, MODERATE (HCC): Primary | ICD-10-CM

## 2024-08-08 RX ORDER — TRAZODONE HYDROCHLORIDE 50 MG/1
50 TABLET ORAL 2 TIMES DAILY
Qty: 90 TABLET | Refills: 0 | Status: SHIPPED | OUTPATIENT
Start: 2024-08-08

## 2024-08-22 DIAGNOSIS — J44.9 CHRONIC OBSTRUCTIVE PULMONARY DISEASE, UNSPECIFIED COPD TYPE (HCC): ICD-10-CM

## 2024-08-22 RX ORDER — ALBUTEROL SULFATE 0.83 MG/ML
2.5 SOLUTION RESPIRATORY (INHALATION) EVERY 6 HOURS PRN
Qty: 720 ML | Refills: 1 | Status: SHIPPED | OUTPATIENT
Start: 2024-08-22

## 2024-08-29 ENCOUNTER — TELEPHONE (OUTPATIENT)
Age: 64
End: 2024-08-29

## 2024-08-29 NOTE — TELEPHONE ENCOUNTER
Caller: [facility]     Doctor: Daniel     Reason for call: Calling for update on standard written order that was faxed to  on 8/13/24 they have not yet received it back ? She will send another order today patient does have a scheduled appointment on 9/3/2024 with [facility]     Please advise     Call back#: [phone]

## 2024-09-14 DIAGNOSIS — G62.9 NEUROPATHY: ICD-10-CM

## 2024-09-14 RX ORDER — FOLIC ACID 1 MG/1
1000 TABLET ORAL DAILY
Qty: 90 TABLET | Refills: 0 | Status: SHIPPED | OUTPATIENT
Start: 2024-09-14

## 2024-09-16 ENCOUNTER — VBI (OUTPATIENT)
Dept: ADMINISTRATIVE | Facility: OTHER | Age: 64
End: 2024-09-16

## 2024-09-16 NOTE — TELEPHONE ENCOUNTER
09/16/24 10:50 AM     Chart reviewed for CRC: Colonoscopy ; nothing is submitted to the patient's insurance at this time.     Ashley Duvall MA   [facility]

## 2024-09-17 ENCOUNTER — VBI (OUTPATIENT)
Dept: ADMINISTRATIVE | Facility: OTHER | Age: 64
End: 2024-09-17

## 2024-09-17 NOTE — TELEPHONE ENCOUNTER
09/17/24 10:48 AM     Chart reviewed for Diabetic Eye Exam ; nothing is submitted to the patient's insurance at this time.     Ashley Duvall MA   [facility]

## 2024-09-18 NOTE — PROGRESS NOTES
Patient ID: Aristeo Hall is a 63 y.o. male Date of Birth 1960       Chief Complaint   Patient presents with    Follow-up     Nail care              Diagnosis:  1. Type 2 diabetes mellitus with diabetic neuropathy, with long-term current use of insulin (HCC)  -     ammonium lactate (LAC-HYDRIN) 12 % cream; Apply topically 2 (two) times a day  2. Onychomycosis  3. Xerosis of skin  -     ammonium lactate (LAC-HYDRIN) 12 % cream; Apply topically 2 (two) times a day    Patient presents for annual diabetic foot examination and at-risk diabetic foot care.  Patient has no acute concerns today.  Patient has significant lower extremity risk due to neuropathy, parasthesia, edema, and trophic skin changes to the lower extremity. Most recent HBA1c was 6.6 on 4/5/24, FBS this am 110,  Last visit with PCP 7/9/24.  Patient has gotten his new diabetic shoes and inserts, he states they are comfortable but his feet still hurt from the neuropathy.    On exam patient has thickened, hypertrophic, discolored, brittle toenails with subungual debris and tenderness x10   Callus: None  Patient does not have BL lower extremity edema  Patient's skin is atrophic, thickened nails, and decreased pedal hair  Patient has decreased pinprick and vibratory sensation to his feet and parasthesia  Patient does not have any  pedal ulcerations.  DP/PT are 2 out of 4 bilaterally    Diabetic Foot Exam    Patient's shoes and socks removed.    Right Foot/Ankle   Right Foot Inspection  Skin Exam: skin normal and skin intact. No dry skin, no warmth, no callus, no erythema, no maceration, no abnormal color, no pre-ulcer, no ulcer and no callus.     Toe Exam: ROM and strength within normal limits.     Sensory   Vibration: absent  Proprioception: absent  Monofilament testing: absent    Vascular  Capillary refills: < 3 seconds  The right DP pulse is 2+. The right PT pulse is 2+.     Right Toe  - Comprehensive Exam  Ecchymosis: none  Arch: pes  "planus  Hammertoes: second toe, third toe and fourth toe  Swelling: none   Tenderness: none       Left Foot/Ankle  Left Foot Inspection  Skin Exam: skin normal and skin intact. No dry skin, no warmth, no erythema, no maceration, normal color, no pre-ulcer, no ulcer and no callus.     Toe Exam: ROM and strength within normal limits.     Sensory   Vibration: absent  Proprioception: absent  Monofilament testing: absent    Vascular  Capillary refills: < 3 seconds  The left DP pulse is 2+. The left PT pulse is 2+.     Left Toe  - Comprehensive Exam  Ecchymosis: none  Arch: pes planus  Hammertoes: second toe, third toe and fourth toe  Swelling: none   Tenderness: none       Assign Risk Category  Deformity present  Loss of protective sensation  No weak pulses  Risk: 2      Today's treatment includes:  Debridement of toenails. Using nail nipper, chavo, and curette, nails were sharply debrided, reduced in thickness and length. Devitalized nail tissue and fungal debris excised and removed. Patient tolerated well.      Continue Ammonium lactate 12% cream  to top and bottom of feet, avoid in between the toes-prescription was refilled today.      Discussed proper shoe gear, daily inspections of feet, and general foot health with patient. Patient has Q9 findings and is recommended for at risk foot care every 9-10 weeks.    Patients most recent complete clinical foot exam was on: today    The following portions of the patient's history were reviewed and updated as appropriate: allergies, current medications, past family history, past medical history, past social history, past surgical history, and problem list.      /83 (BP Location: Right arm, Patient Position: Sitting, Cuff Size: Standard)   Pulse 84       No pertinent results found.      Mary Sanchez DPM, DPM, FACFAS    Portions of the record may have been created with voice recognition software. Occasional wrong word or \"sound a like\" substitutions may have occurred " due to the inherent limitations of voice recognition software. Read the chart carefully and recognize, using context, where substitutions have occurred.

## 2024-09-19 ENCOUNTER — OFFICE VISIT (OUTPATIENT)
Dept: PODIATRY | Facility: CLINIC | Age: 64
End: 2024-09-19
Payer: COMMERCIAL

## 2024-09-19 VITALS — HEART RATE: 84 BPM | DIASTOLIC BLOOD PRESSURE: 83 MMHG | SYSTOLIC BLOOD PRESSURE: 124 MMHG

## 2024-09-19 DIAGNOSIS — Z79.4 TYPE 2 DIABETES MELLITUS WITH DIABETIC NEUROPATHY, WITH LONG-TERM CURRENT USE OF INSULIN (HCC): Primary | ICD-10-CM

## 2024-09-19 DIAGNOSIS — E11.40 TYPE 2 DIABETES MELLITUS WITH DIABETIC NEUROPATHY, WITHOUT LONG-TERM CURRENT USE OF INSULIN (HCC): ICD-10-CM

## 2024-09-19 DIAGNOSIS — E11.40 TYPE 2 DIABETES MELLITUS WITH DIABETIC NEUROPATHY, WITH LONG-TERM CURRENT USE OF INSULIN (HCC): Primary | ICD-10-CM

## 2024-09-19 DIAGNOSIS — B35.1 ONYCHOMYCOSIS: ICD-10-CM

## 2024-09-19 DIAGNOSIS — L85.3 XEROSIS OF SKIN: ICD-10-CM

## 2024-09-19 DIAGNOSIS — I25.10 CORONARY ARTERY DISEASE INVOLVING NATIVE CORONARY ARTERY OF NATIVE HEART WITHOUT ANGINA PECTORIS: ICD-10-CM

## 2024-09-19 PROCEDURE — 99213 OFFICE O/P EST LOW 20 MIN: CPT | Performed by: PODIATRIST

## 2024-09-19 PROCEDURE — 11721 DEBRIDE NAIL 6 OR MORE: CPT | Performed by: PODIATRIST

## 2024-09-19 RX ORDER — PROCHLORPERAZINE 25 MG/1
SUPPOSITORY RECTAL
Qty: 9 EACH | Refills: 1 | Status: SHIPPED | OUTPATIENT
Start: 2024-09-19

## 2024-09-19 RX ORDER — NITROGLYCERIN 0.4 MG/1
0.4 TABLET SUBLINGUAL
Qty: 25 TABLET | Refills: 1 | Status: SHIPPED | OUTPATIENT
Start: 2024-09-19

## 2024-09-19 RX ORDER — AMMONIUM LACTATE 12 G/100G
CREAM TOPICAL 2 TIMES DAILY
Qty: 385 G | Refills: 2 | Status: SHIPPED | OUTPATIENT
Start: 2024-09-19

## 2024-09-25 DIAGNOSIS — I25.10 CORONARY ARTERY DISEASE INVOLVING NATIVE CORONARY ARTERY OF NATIVE HEART WITHOUT ANGINA PECTORIS: ICD-10-CM

## 2024-09-25 DIAGNOSIS — I21.3 STEMI (ST ELEVATION MYOCARDIAL INFARCTION) (HCC): ICD-10-CM

## 2024-09-25 DIAGNOSIS — I50.32 CHRONIC DIASTOLIC CONGESTIVE HEART FAILURE (HCC): ICD-10-CM

## 2024-09-25 RX ORDER — LOSARTAN POTASSIUM 50 MG/1
50 TABLET ORAL DAILY
Qty: 100 TABLET | Refills: 1 | Status: SHIPPED | OUTPATIENT
Start: 2024-09-25

## 2024-09-25 RX ORDER — ATORVASTATIN CALCIUM 80 MG/1
80 TABLET, FILM COATED ORAL
Qty: 90 TABLET | Refills: 1 | Status: SHIPPED | OUTPATIENT
Start: 2024-09-25

## 2024-09-25 NOTE — TELEPHONE ENCOUNTER
Reason for call:   [x] Refill   [] Prior Auth  [x] Other: Pharmacy is requesting 100 days supply     Office:   [] PCP/Provider -   [x] Specialty/Provider - Cardio Andree     Medication: atorvastatin (LIPITOR) 80 mg Take 1 tablet (80 mg total) by mouth daily after dinner       Pharmacy: [facility]     Does the patient have enough for 3 days?   [x] Yes   [] No - Send as HP to POD

## 2024-09-25 NOTE — TELEPHONE ENCOUNTER
Patient's insurance called requesting a refill for Jardiance for a 100 day supply be sent to the pharmacy.    Reason for call:   [x] Refill   [] Prior Auth  [] Other:     Office:   [] PCP/Provider -   [x] Specialty/Provider - Endocrinology     Medication: Jardiance     Dose/Frequency: 10 mg tablets taken by mouth once every morning     Quantity: 100    Pharmacy: [facility]. [phone]     Does the patient have enough for 3 days?   [x] Yes   [] No - Send as HP to POD

## 2024-09-25 NOTE — TELEPHONE ENCOUNTER
Patient's insurance company called requesting a refill on patient's Jardiance requesting a 100 day supply.     Reason for call:   [x] Refill   [] Prior Auth  [] Other:     Office:   [] PCP/Provider -   [x] Specialty/Provider - Cardiology     Medication: Losartan     Dose/Frequency: 50 mg tablet taken by mouth once daily     Quantity: 100    Pharmacy: [address] [place] [address] [address]. [phone]     Does the patient have enough for 3 days?   [x] Yes   [] No - Send as HP to POD

## 2024-09-25 NOTE — TELEPHONE ENCOUNTER
Refill must be reviewed and completed by the office or provider. The refill is unable to be approved or denied by the medication management team.    Patient last seen x6M and was to return in , appt schedule for 2025   Return in about 2 months (around 5/28/2024).  Please review to see if the refill is appropriate.

## 2024-10-01 ENCOUNTER — OFFICE VISIT (OUTPATIENT)
Dept: ENDOCRINOLOGY | Facility: CLINIC | Age: 64
End: 2024-10-01
Payer: COMMERCIAL

## 2024-10-01 VITALS — BODY MASS INDEX: 39.6 KG/M2 | SYSTOLIC BLOOD PRESSURE: 142 MMHG | HEIGHT: 72 IN | DIASTOLIC BLOOD PRESSURE: 75 MMHG

## 2024-10-01 DIAGNOSIS — E11.65 TYPE 2 DIABETES MELLITUS WITH HYPERGLYCEMIA, WITHOUT LONG-TERM CURRENT USE OF INSULIN (HCC): Primary | ICD-10-CM

## 2024-10-01 DIAGNOSIS — E78.00 PURE HYPERCHOLESTEROLEMIA: ICD-10-CM

## 2024-10-01 DIAGNOSIS — J44.9 CHRONIC OBSTRUCTIVE PULMONARY DISEASE, UNSPECIFIED COPD TYPE (HCC): ICD-10-CM

## 2024-10-01 DIAGNOSIS — E55.9 VITAMIN D DEFICIENCY: ICD-10-CM

## 2024-10-01 DIAGNOSIS — F33.1 MAJOR DEPRESSIVE DISORDER, RECURRENT, MODERATE (HCC): ICD-10-CM

## 2024-10-01 DIAGNOSIS — E11.40 TYPE 2 DIABETES MELLITUS WITH DIABETIC NEUROPATHY, WITHOUT LONG-TERM CURRENT USE OF INSULIN (HCC): ICD-10-CM

## 2024-10-01 LAB — SL AMB POCT HEMOGLOBIN AIC: 6.3 (ref ?–6.5)

## 2024-10-01 PROCEDURE — 83036 HEMOGLOBIN GLYCOSYLATED A1C: CPT

## 2024-10-01 PROCEDURE — 99214 OFFICE O/P EST MOD 30 MIN: CPT

## 2024-10-01 PROCEDURE — 95251 CONT GLUC MNTR ANALYSIS I&R: CPT

## 2024-10-01 RX ORDER — TRAZODONE HYDROCHLORIDE 50 MG/1
50 TABLET, FILM COATED ORAL 2 TIMES DAILY
Qty: 180 TABLET | Refills: 1 | Status: SHIPPED | OUTPATIENT
Start: 2024-10-01

## 2024-10-01 RX ORDER — FLUTICASONE PROPIONATE AND SALMETEROL 500; 50 UG/1; UG/1
1 POWDER RESPIRATORY (INHALATION) DAILY
Qty: 180 BLISTER | Refills: 1 | Status: SHIPPED | OUTPATIENT
Start: 2024-10-01

## 2024-10-01 RX ORDER — PROCHLORPERAZINE 25 MG/1
SUPPOSITORY RECTAL
Qty: 9 EACH | Refills: 1 | Status: SHIPPED | OUTPATIENT
Start: 2024-10-01

## 2024-10-01 NOTE — PROGRESS NOTES
Established Patient Progress Note    Chief Complaint   Patient presents with    Diabetes Type 2       Impression & Plan:    Problem List Items Addressed This Visit       Hyperlipidemia     Follows with cardiology          Vitamin D deficiency     Labs ordered 05/2024 patient did not complete. Encourage he complete these.         Type 2 diabetes mellitus with hyperglycemia, without long-term current use of insulin ([facility]) - Primary     CGM report shows BG higher the past 2 weeks than prior. He is interested in starting a GLP1 agent. BMI is 39.6.  Denies history of MEN syndrome, MTC or pancreatitis  Requests Mounjaro. Will start 2.5 mg/week. Side effects reviewed. If tolerating, increase to 5 mg/week    Lab Results   Component Value Date    HGBA1C 6.3 10/01/2024            Relevant Medications    tirzepatide (Mounjaro) 2.5 MG/0.5ML    tirzepatide (Mounjaro) 5 MG/0.5ML     Other Visit Diagnoses       Type 2 diabetes mellitus with diabetic neuropathy, without long-term current use of insulin ([facility])        Relevant Medications    tirzepatide (Mounjaro) 2.5 MG/0.5ML    tirzepatide (Mounjaro) 5 MG/0.5ML    Continuous Glucose Sensor (Dexcom G6 Sensor) MISC    Other Relevant Orders    POCT hemoglobin A1c (Completed)            Orders Placed This Encounter   Procedures    POCT hemoglobin A1c       History of Present Illness:     Aristeo Hall is a 63 y.o. male with a history of type 2 diabetes, CHF, CAD, ARLEEN, gastric sleeve (2019), CKD, hyperlipidemia, obesity. Complications: CVA, neuropathy. Last A1C was 6.3. Home glucose monitoring: CGM. Notes blood sugars are higher past 2 weeks. Reports neuropathy has been worse. Recently got new shoes through podiatry. Has plans to call them today. He would like to start a GLP agent.    Hypoglycemia: no  Recent hospitalizations or illnesses: no  Problems with current regimen: no    CGM Interpretation  Aristeo Hall   Device used Dexcom  Home use   Indication: Type 2  Diabetes  More than 72 hours of data was reviewed. Report to be scanned to chart.   Date Range: Sept 17 to Sept 30, 2024  Analysis of data:   Average Glucose: 156  SD : 30   Time in Target Range: 79%   Time Above Range: high: 20%; very high: 1%  Time Below Range: 0%   Interpretation of data: Blood sugars higher than prior the past 2 weeks.     Exercise: limited     Current regimen:   Jardiance 10 mg daily    ACE/ARB: losartan  Has hyperlipidemia: Taking atorvastatin      Eye exam: due for appointment  Foot exam: UTD, 9/2024    Patient Active Problem List   Diagnosis    Chronic diastolic congestive heart failure (HCC)    Coronary artery disease of native artery of native heart with stable angina pectoris (HCC)    Morbid obesity ([facility])    History of CVA (cerebrovascular accident)    Stented coronary artery    Obstructive sleep apnea syndrome    CPAP (continuous positive airway pressure) dependence    Cerebral aneurysm    Chronic pain disorder    Constipated    Chest pain    Stage 2 chronic kidney disease    GERD (gastroesophageal reflux disease)    Opioid dependence, continuous (HCC)    Esophageal dysphagia    Chronic migraine without aura without status migrainosus, not intractable    Hyperlipidemia    Vitamin D deficiency    Symptomatic bradycardia    Bilateral foot pain    Orthostatic hypotension    Primary osteoarthritis of both knees    Mild cognitive impairment    COPD (chronic obstructive pulmonary disease) (HCC)    Major depressive disorder, recurrent, moderate (HCC)    Radiculopathy, lumbosacral region    Neuropathy    Presence of intrathecal pump    Skin inflammation    Status post insertion of spinal cord stimulator    History of gastric sleeve procedure    Myocarditis ([facility])    Arteriosclerosis of artery of extremity ([facility])    Hiatal hernia    Diabetic neuropathy (HCC)    ARLEEN (obstructive sleep apnea)    Abnormal MRI of head    Blurring of visual image    Chronic migraine without aura    Type 2 diabetes  mellitus with hyperglycemia, without long-term current use of insulin ([facility])    Bariatric surgery status    Chronic daily headache    S/P repair of paraesophageal hernia    Anxiety and depression      Past Medical History:   Diagnosis Date    Acute on chronic diastolic congestive heart failure (HCC)     Altered gait     Alzheimer disease ([facility])     per patients,,early onset     Angina pectoris ([facility])     Anxiety     Arthritis     Brain aneurysm     coils placed    Cardiac disease     Chest pain 01/13/2016    Chronic kidney disease     Chronic pain     back/ right groin and rle- has morphine pump    Constipation     COPD (chronic obstructive pulmonary disease) ([facility])     Coronary artery disease     Decubital ulcer     sacral decub-occured 5/2019-sees wound care/debide in OR today 6/6/2019    Dependent on walker for ambulation     w/c for long distance    Depression     Diabetes mellitus ([facility])     insulin dependent -- pt states no longer dependent on insulin    Difficulty walking     Dizziness     occ    Dysphagia     Enlarged prostate     Esophageal stricture In file    Esophageal varices ([facility])     Fall     Fall at home 05/03/2019    GERD (gastroesophageal reflux disease)     Heart failure ([facility])     Hiatal hernia     Hx of gastric bypass 11/19/2018    states Nov 2019    Hypercholesterolemia     Hypertension     Ingrown toenail     MI (myocardial infarction) ([facility])     2017- stents x2    Migraine     Morbid obesity ([facility])     gastric bypass sleeve 11/2018-wt loss 125 lb    Myocardial infarction ([facility]) 2003    Neuropathy     Obesity 2003    Oxygen dependent     Q HS  2LPM with CPAP and prn during day 2-3 LPM  ( pt states no longer needs oxygen )    Pressure injury of skin     Pressure injury of skin of sacral region 06/03/2019    Added automatically from request for surgery [id number]    Renal disorder     Shortness of breath     Skin abnormality     sacral wound - covered with pad    Sleep apnea     couldnt tolerate CPAP     Stented coronary artery     Stroke ([facility])     vision loss b/l  2005, residual R leg weakness    Type 2 diabetes mellitus with diabetic neuropathy, with long-term current use of insulin ([facility]) 10/18/2019    Type 2 diabetes mellitus with renal complication ([facility])     insulin dependent    Type 2 diabetes mellitus, with long-term current use of insulin ([facility]) 10/11/2017    Transitioned From: Diabetes mellitus type 2, uncontrolled    Urinary frequency     Use of cane as ambulatory aid     Wears dentures     Wears glasses     Wears glasses     Wheelchair dependent     states uses walker & cane      Past Surgical History:   Procedure Laterality Date    BACK SURGERY      BRAIN SURGERY      CARDIAC CATHETERIZATION      with stents    CARDIAC CATHETERIZATION N/A 8/18/2023    Procedure: Cardiac Coronary Angiogram;  Surgeon: Horacio Weiner MD;  Location: [facility];  Service: Cardiology    CEREBRAL ANEURYSM REPAIR      with coils    COLONOSCOPY      ESOPHAGOGASTRODUODENOSCOPY N/A 7/1/2016    Procedure: ESOPHAGOGASTRODUODENOSCOPY (EGD);  Surgeon: Reno Christiansen MD;  Location: [facility];  Service:     GASTRIC BYPASS  11/19/2018    HERNIA REPAIR      HIATAL HERNIA REPAIR      INFUSION PUMP IMPLANTATION Left     morphine    INTRATHECAL PUMP IMPLANTATION Left 7/9/2020    Procedure: REVISION INTRATHECAL PAIN PUMP POCKET, LEFT ABDOMEN;  Surgeon: Homero Cho MD;  Location: [facility];  Service: Neurosurgery    KNEE ARTHROSCOPY Right     KNEE ARTHROSCOPY Right     PERONEAL NERVE DECOMPRESSION Right     HI DEBRIDEMENT OPEN WOUND FIRST 20 SQ CM/< N/A 6/6/2019    Procedure: EXCISIONAL DEBRIDEMENT OF SACRAL DECUBITUS ULCER;  Surgeon: Siddhartha Omer MD;  Location: [facility];  Service: General    HI ESOPHAGOGASTRODUODENOSCOPY TRANSORAL DIAGNOSTIC N/A 2/27/2017    Procedure: ESOPHAGOGASTRODUODENOSCOPY (EGD);  Surgeon: Reno Christiansen MD;  Location: [facility];  Service: Gastroenterology    HI ESOPHAGOGASTRODUODENOSCOPY TRANSORAL DIAGNOSTIC  N/A 8/23/2018    Procedure: ESOPHAGOGASTRODUODENOSCOPY (EGD) with biopsy;  Surgeon: Reno Christiansen MD;  [facility]: [facility];  Service: Gastroenterology    NV IMPLTJ/RPLCMT ITHCL/EDRL DRUG NFS PRGRBL PUMP Left 10/13/2020    Procedure: EXPLORATION OF INTRATHECAL PAIN PUMP SYSTEM INTEGRITY FOR POSSIBLE REPLACEMENT OF CATHETER AND PUMP.;  Surgeon: Homero Cho MD;  Location: [facility];  Service: Neurosurgery    NV IMPLTJ/RPLCMT ITHCL/EDRL DRUG NFS SUBQ RSVR N/A 1/19/2017    Procedure: REMOVAL / EXCHANGE INTRATHECAL PAIN PUMP;  Surgeon: Que Leonard MD;  [facility];  Service: Orthopedics    NV IMPLTJ/RPLCMT ITHCL/EDRL DRUG NFS SUBQ RSVR N/A 5/16/2016    Procedure: REPLACEMENT AND PROGRAM PUMP ;  Surgeon: Que Leonard MD;  [facility];  Service: Orthopedics    NV LAPS RPR PARAESPHGL HRNA INCL FUNDPLSTY W/MESH N/A 7/23/2024    Procedure: REPAIR HERNIA PARAESOPHAGEAL  W ROBOTICS & MAGNETIC SPINCTER AUGMENTATION DEVISE;  Surgeon: Mansi Mauro MD;  [facility];  Service: Bariatrics    NV PRQ IMPLTJ NSTIM ELECTRODE ARRAY EPIDURAL Right 3/17/2021    Procedure: INSERTION THORACIC DORSAL COLUMN SPINAL CORD STIMULATOR PERCUTANEOUS W IMPLANTABLE PULSE GENERATOR, RIGHT;  Surgeon: Homero Cho MD;  Location[facility];  Service: Neurosurgery      Family History   Problem Relation Age of Onset    Diabetes unspecified Mother     Diabetes Mother     Heart attack Father     Heart disease Father     Hypertension Father     Stroke Father     Diabetes unspecified Brother     Depression Brother     Mental illness Brother     COPD Brother     Drug abuse Brother     Bipolar disorder Brother     Diabetes unspecified Brother     Diabetes unspecified Maternal Grandmother     Diabetes unspecified Paternal Grandmother     Diabetes unspecified Paternal Uncle     ADD / ADHD Cousin     Colon cancer Neg Hx     Colon polyps Neg Hx      Social History     Tobacco Use    Smoking status: Never    Smokeless tobacco: Never    Substance Use Topics    Alcohol use: Not Currently     No Known Allergies      Current Outpatient Medications:     albuterol (2.5 mg/3 mL) 0.083 % nebulizer solution, Take 3 mL (2.5 mg total) by nebulization every 6 (six) hours as needed for wheezing or shortness of breath, Disp: 720 mL, Rfl: 1    amLODIPine (NORVASC) 5 mg tablet, Take 1 tablet (5 mg total) by mouth daily, Disp: 90 tablet, Rfl: 0    ammonium lactate (LAC-HYDRIN) 12 % cream, Apply topically 2 (two) times a day, Disp: 385 g, Rfl: 2    aspirin 81 mg chewable tablet, Chew 1 tablet (81 mg total) daily, Disp: 90 tablet, Rfl: 1    atorvastatin (LIPITOR) 80 mg tablet, Take 1 tablet (80 mg total) by mouth daily after dinner, Disp: 90 tablet, Rfl: 1    baclofen 10 mg tablet, Take 10 mg by mouth 3 (three) times a day, Disp: , Rfl:     busPIRone (BUSPAR) 5 mg tablet, Take 5 mg by mouth 2 (two) times a day, Disp: , Rfl:     CALCIUM PO, Take 1 tablet by mouth daily, Disp: , Rfl:     carvedilol (COREG) 12.5 mg tablet, Take 1 tablet by mouth twice daily with food, Disp: 180 tablet, Rfl: 1    clopidogrel (PLAVIX) 75 mg tablet, Take 1 tablet (75 mg total) by mouth daily, Disp: 90 tablet, Rfl: 1    Continuous Blood Gluc Transmit (Dexcom G6 Transmitter) MISC, 1 TRANSMITTED EVERY 3 MONTHS FOR CONTINUOUS GLUCOSE MONITORING, Disp: 1 each, Rfl: 3    Continuous Glucose Sensor (Dexcom G6 Sensor) MISC, 3 PACK SENSOR FOR CONTINUOUS GLUCOSE MONITORING, Disp: 9 each, Rfl: 1    docusate sodium (COLACE) 100 mg capsule, Take 1 capsule (100 mg total) by mouth 2 (two) times a day, Disp: 180 capsule, Rfl: 3    DULoxetine (Cymbalta) 30 mg delayed release capsule, Take 1 capsule (30 mg total) by mouth daily, Disp: 30 capsule, Rfl: 3    Empagliflozin (JARDIANCE) 10 MG TABS tablet, Take 1 tablet (10 mg total) by mouth every morning, Disp: 100 tablet, Rfl: 1    ergocalciferol (VITAMIN D2) 50,000 units, Take 1 capsule (50,000 Units total) by mouth once a week, Disp: 12 capsule, Rfl: 1     Fluticasone-Salmeterol (Advair Diskus) 500-50 mcg/dose inhaler, Inhale 1 puff 2 (two) times a day Rinse mouth after use, Disp: 60 blister, Rfl: 3    folic acid (FOLVITE) 1 mg tablet, Take 1 tablet by mouth once daily, Disp: 90 tablet, Rfl: 0    furosemide (LASIX) 40 mg tablet, Take once in AM daily. Take once daily in PM PRN weight gain, edema., Disp: 180 tablet, Rfl: 3    gabapentin (NEURONTIN) 300 mg capsule, Take 300 mg by mouth 3 (three) times a day as needed, Disp: , Rfl:     gabapentin (NEURONTIN) 800 mg tablet, Take 800 mg by mouth 4 (four) times a day, Disp: , Rfl:     hydrocortisone 1 % lotion, Apply topically if needed for rash, Disp: 59 mL, Rfl: 1    lidocaine (LIDODERM) 5 %, Apply 1 patch topically daily back, Disp: , Rfl:     losartan (COZAAR) 50 mg tablet, Take 1 tablet (50 mg total) by mouth daily, Disp: 100 tablet, Rfl: 1    methocarbamol (ROBAXIN) 750 mg tablet, TAKE 1 TABLET (750 MG TOTAL) BY MOUTH EVERY 6 (SIX) HOURS AS NEEDED FOR MUSCLE SPASMS, Disp: 120 tablet, Rfl: 0    naloxegol oxalate (MOVANTIK) 25 MG tablet, Take 1 tablet (25 mg total) by mouth daily in the early morning, Disp: 30 tablet, Rfl: 5    nitroglycerin (NITROSTAT) 0.4 mg SL tablet, Place 1 tablet (0.4 mg total) under the tongue every 5 (five) minutes as needed for chest pain (pt has not  used recently), Disp: 25 tablet, Rfl: 1    nystatin (MYCOSTATIN) cream, Apply 2 g (1 Application total) topically 2 (two) times a day, Disp: 15 g, Rfl: 0    omeprazole (PriLOSEC) 20 mg delayed release capsule, Take 1 capsule (20 mg total) by mouth daily Do not start before July 24, 2024., Disp: 90 capsule, Rfl: 1    ondansetron (ZOFRAN) 4 mg tablet, Take 1 tablet (4 mg total) by mouth every 8 (eight) hours as needed for nausea or vomiting, Disp: 60 tablet, Rfl: 3    potassium chloride (Klor-Con M20) 20 mEq tablet, Take 1 tablet (20 mEq total) by mouth daily, Disp: 90 tablet, Rfl: 0    ranolazine (RANEXA) 1000 MG SR tablet, TAKE 1 TABLET (1,000 MG  TOTAL) BY MOUTH EVERY 12 HOURS, Disp: 60 tablet, Rfl: 3    tamsulosin (FLOMAX) 0.4 mg, TAKE 1 CAPSULE BY MOUTH EVERY DAY WITH DINNER, Disp: 90 capsule, Rfl: 1    tirzepatide (Mounjaro) 2.5 MG/0.5ML, Inject 0.5 mL (2.5 mg total) under the skin every 7 days, Disp: 2 mL, Rfl: 0    tirzepatide (Mounjaro) 5 MG/0.5ML, Inject 0.5 mL (5 mg total) under the skin every 7 days, Disp: 2 mL, Rfl: 2    traZODone (DESYREL) 100 mg tablet, Take 1 tablet (100 mg total) by mouth daily at bedtime, Disp: 30 tablet, Rfl: 0    traZODone (DESYREL) 50 mg tablet, Take 1 tablet by mouth twice daily, Disp: 90 tablet, Rfl: 0    vitamin B-12 (VITAMIN B-12) 1,000 mcg tablet, Take 1 tablet (1,000 mcg total) by mouth daily, Disp: 90 tablet, Rfl: 3    Review of Systems   Constitutional:  Negative for chills and fever.   HENT:  Negative for ear pain and sore throat.    Eyes:  Negative for pain and visual disturbance.   Respiratory:  Negative for cough and shortness of breath.    Cardiovascular:  Negative for chest pain and palpitations.   Gastrointestinal:  Negative for abdominal pain and vomiting.   Genitourinary:  Negative for dysuria and hematuria.   Musculoskeletal:  Negative for arthralgias and back pain.   Skin:  Negative for color change and rash.   Neurological:  Negative for seizures and syncope.   All other systems reviewed and are negative.      Physical Exam:  Body mass index is 39.6 kg/m².  /75   Ht 6' (1.829 m)   BMI 39.60 kg/m²    Wt Readings from Last 3 Encounters:   08/02/24 132 kg (292 lb)   07/23/24 134 kg (295 lb)   07/12/24 134 kg (296 lb 8 oz)       Physical Exam  Vitals reviewed.   Constitutional:       Appearance: Normal appearance.   HENT:      Head: Normocephalic and atraumatic.   Pulmonary:      Effort: Pulmonary effort is normal.   Neurological:      Mental Status: He is alert and oriented to person, place, and time.   Psychiatric:         Mood and Affect: Mood normal.         Behavior: Behavior normal.            Labs:   Lab Results   Component Value Date    HGBA1C 6.3 10/01/2024    HGBA1C 6.6 (H) 04/05/2024    HGBA1C 6.2 (H) 03/28/2024     Lab Results   Component Value Date    CREATININE 1.06 07/24/2024    CREATININE 1.16 04/05/2024    CREATININE 1.24 04/01/2024    BUN 13 07/24/2024     (L) 10/21/2015    K 3.8 07/24/2024     07/24/2024    CO2 26 07/24/2024     GFR, Calculated   Date Value Ref Range Status   01/08/2020 78 >60 mL/min/1.73m2 Final     Comment:     mL/min per 1.73 square meters                                            Normal Function or Mild Renal    Disease (if clinically at risk):  >or=60  Moderately Decreased:                30-59  Severely Decreased:                  15-29  Renal Failure:                         <15                                            -American GFR: multiply reported GFR by 1.16    Please note that the eGFR is based on the CKD-EPI calculation, and is not intended to be used for drug dosing.                                            Note: Calculated GFR may not be an accurate indicator of renal function if the patient's renal function is not in a steady state.     eGFRcr   Date Value Ref Range Status   07/14/2022 100 >59 Final     eGFR   Date Value Ref Range Status   07/24/2024 75 ml/min/1.73sq m Final     Lab Results   Component Value Date    CHOL 119 01/06/2015    HDL 26 (L) 03/29/2024    TRIG 212 (H) 03/29/2024     Lab Results   Component Value Date    ALT 24 04/05/2024    AST 17 04/05/2024    ALKPHOS 75 04/05/2024    BILITOT 0.81 10/19/2015     Lab Results   Component Value Date    HDB9PEWGKRZO 3.127 03/28/2024    RKR6ZIUQCHLO 2.656 08/18/2023    DGL4OKHGKJDV 2.318 01/28/2020     Lab Results   Component Value Date    FREET4 1.09 01/22/2018         There are no Patient Instructions on file for this visit.      Discussed with the patient and all questioned fully answered. He will call me if any problems arise.

## 2024-10-01 NOTE — ASSESSMENT & PLAN NOTE
CGM report shows BG higher the past 2 weeks than prior. He is interested in starting a GLP1 agent. BMI is 39.6.  Denies history of MEN syndrome, MTC or pancreatitis  Requests Mounjaro. Will start 2.5 mg/week. Side effects reviewed. If tolerating, increase to 5 mg/week    Lab Results   Component Value Date    HGBA1C 6.3 10/01/2024

## 2024-10-07 NOTE — PROGRESS NOTES
Ambulatory Visit  Name: Aristeo Hall      : 1960      MRN: [id number]  Encounter Provider: Charlotte Cole MD  Encounter Date: 10/8/2024   Encounter department: [facility]        Assessment & Plan  Diabetic neuropathy (HCC)  On exam, he has components of both large and small fiber neuropathy.  The only clear underlying etiology is diabetes.  B12, B1, B6, Lyme, zinc and immunofixation were all unremarkable.  He has tried gabapentin up to 3200 mg daily without relief.  He tried Lyrica in the past which was also not helpful.  I am reluctant to try a tricyclic antidepressant because of borderline elevated QTc.  He felt the duloxetine was too sedating.  He has tried lidocaine ointment which caused more pain than improvement due to the pain from the actual application.  He has a spinal cord stimulator in place which has not been helpful. Given the symmetric sensory loss without clear trauma to the legs, CRPS seems less likely.    Recommendations:  -Continue gabapentin 800 mg 4 times daily  -Counseled that he should not increase by a full tablet as he will exceed the daily recommended dosage  -Discontinue duloxetine as a cause sedation  -Trial of topiramate.  Start 25 mg nightly and then increase to 25 mg twice daily after 2 weeks.  We will have to follow cognitive function but he should not have an issue at lower doses.  He denies history of kidney stones.  Lab Results   Component Value Date    HGBA1C 6.3 10/01/2024       Orders:    gabapentin (NEURONTIN) 800 mg tablet; Take 1 tablet (800 mg total) by mouth 4 (four) times a day    topiramate (Topamax) 25 mg tablet; Take 1 tablet once a day for 2 weeks. Then take 1 tablet in the morning and one tablet in the evening.    Chronic daily headache  Dr. Qiu recommended he take gabapentin as needed for the chronic daily headaches.  He can continue with 300 milligrams daily as needed.  The addition of topiramate may also be  helpful.    Orders:    gabapentin (NEURONTIN) 300 mg capsule; Take 1 capsule (300 mg total) by mouth daily as needed (headache)       History of CVA (cerebrovascular accident)  Assessment & Plan:  He has a history of cerebellar and occipital strokes.  He does have a field cut.  He is on aspirin and Plavix.  Presence of intrathecal pump  Assessment & Plan:  He had previous injury to the femoral nerve in 2004.  He has a morphine pump and is followed by pain management.  CPAP (continuous positive airway pressure) dependence  Assessment & Plan:  He has ARLEEN and was recently started on CPAP.  Cerebral aneurysm  Assessment & Plan:  He had previous intervention in 2004 and is followed by neurosurgery.      Follow-up in 4 months or sooner if difficulties.            History of Present Illness   This is a  63 yr old gentleman with who presents for follow up of neuropathic pain in the lower extremities. Last seen by me in July 2024. He also follows with Dr Qiu.     Neuropathic pain/ diabetic neuropathy:  His original symptoms started years ago after gastric sleeve surgery done in 2019.  Symptoms started in the feet and gradually progressed up the legs.  Symptoms are now at the level of the knees.  He does have hand weakness.  A spinal cord stimulator was tried but he did not have significant relief for the neuropathic pain.  He takes gabapentin 800 mg 4 times daily without relief.  He sometimes he takes an extra tablet and today asked if I could give him 5 tablets/day.      He tried Lyrica in the past which did not help.  Dr. Qiu tried him on topical analgesics but he did not find them helpful.    He tried duloxetine 30 mg daily, started at last visit. It made him too groggy. He has not been taking it daily. He has a medical marijuana card, but does not use it because he cannot tolerate it.     He has been alpha lipoic acid for about 2 months. He tells me insurance is covering it. It is not changing his symptoms.    DM  is stable. Most recent HbA1c is 6.3 earlier this month. He is mindful of his diet.     He has never had an EMG.     He denies any specific injury to the feet.        Cerebral aneurysm:  In 2004 he had a cerebral angiogram for aneurysm.  He tells us that the femoral nerve was injured at the time.  He has had pain in his leg and weakness of the proximal right leg ever since.  He has a morphine pump in place and follows closely with pain management for the last 20+ years.  He was initially wheelchair-bound but progressed to a rollator which she has been using for about 20 years.  He notes chronic memory issues ever since the aneurysm surgery.  Last visit, he told me he is on memantine but is not listed on his medication list.      Chronic strokes:  He has a history of chronic strokes and difficulty with field cuts superiorly in both eyes.  He is able to drive.      Chronic daily headaches:  He has chronic daily headache. He is on gabapentin 800 mg QID for the neuropathy, with additional 300 mg daily as needed for the headaches.  He describes his headache as a pressure-like pain in a band formation.  They can be associated with photophobia and occasional blurred vision.  No nausea or vomiting.      ARLEEN:  He was started on CPAP, but had multiple nighttime awakenings due to pain and so he was taking his mask on and off.      Other:  He attempted Cologuard recently but was unable to complete it.  Per GI notes, it is due to be rescheduled but the patient does not think he would be able to do it.    PMH: bilateral occipital and left cerebellar lobe infarcts, right ACOM aneurysm, chronic migraine headaches, COPD, chronic pain disorder, CAD,  esophageal dysphagia, GERD, hiatal hernia,  back pain s/p intrathecal pump, lumbosacral radiculopathy, DM, CKD   PSH: gastric sleeve surgery, intrathecal pump        Review of Systems  I have personally reviewed the MA's review of systems and made changes as necessary.    Constitutional:   Negative for appetite change, fatigue and fever.   HENT: Negative.  Negative for hearing loss, tinnitus, trouble swallowing and voice change.    Eyes: + Visual field cuts  Respiratory: Negative.  Negative for shortness of breath.    Cardiovascular: Negative.  Negative for palpitations.   Gastrointestinal: Negative.  Negative for nausea and vomiting.   Endocrine: Negative.  Negative for cold intolerance.   Genitourinary: Negative.  Negative for dysuria, frequency and urgency.   Musculoskeletal:  Positive for gait problem (uses rolling walker). Negative for back pain, myalgias, neck pain and neck stiffness.   Skin: Negative.  Negative for rash.   Allergic/Immunologic: Negative.    Neurological:  Positive for dizziness, weakness (legs), numbness (leg pain b/l legs) and headaches. Negative for tremors, seizures, syncope, facial asymmetry, speech difficulty and light-headedness.   Hematological: Negative.  Does not bruise/bleed easily.   Psychiatric/Behavioral:  Positive for sleep disturbance (not s;eeping well d/t leg pain). Negative for confusion and hallucinations.        Objective     /78 (BP Location: Right arm, Patient Position: Sitting, Cuff Size: Adult)   Pulse 75   Temp 98.1 °F (36.7 °C) (Temporal)   Wt 134 kg (296 lb 8 oz)   SpO2 96%   BMI 40.21 kg/m²     Physical Exam  Constitutional:       Appearance: Normal appearance.   HENT:      Mouth/Throat:      Mouth: Mucous membranes are moist.      Pharynx: Oropharynx is clear.   Eyes:      Conjunctiva/sclera: Conjunctivae normal.   Neck:      Vascular: No carotid bruit.   Cardiovascular:      Rate and Rhythm: Normal rate and regular rhythm.      Heart sounds: Normal heart sounds.   Pulmonary:      Effort: Pulmonary effort is normal.      Breath sounds: Normal breath sounds.   Skin:     General: Skin is warm and dry.   Psychiatric:         Mood and Affect: Mood normal.         Behavior: Behavior normal.       Neurologic Exam  Mental status: Awake, alert,  oriented to person, place and time.  Naming, knowledge, repetition, concentration intact.  Delayed recall 2/3 with prompts.     Cranial nerves: Extraocular movements intact.  Pupils equally round and reactive to light, 2 mm bilaterally.  Visual fields impaired superiorly bilaterally.  Facial sensation intact.  Face symmetric.  Speech clear. Hearing intact.  Tongue, uvula, palate midline and intact.  Sternocleidomastoid intact.     Motor:   Deltoid: Right 4/5, left 4/5  Biceps: Right 4/5, left 4/5  Triceps: Right 4/5, left 4/5  : Right 4-/5, left 4-/5  Intrinsic hand muscles: Right 3/5, left 3/5  Significant giveaway weakness in the upper extremities     Iliopsoas: Right 3/5, left 3/5  Quadriceps: Right 4/5, left 4/5  Tibialis anterior: Right 4/5, left 4/5  Medial gastrocnemius: Right 5/5, left 5/5       Slightly high arches at last visit. I did not have him take off his shoes today.     Cerebellar: No dysmetria.  He has difficulty with heel-to-shin on the right side due to the weakness.  He walks with a rollator.  He has difficulty arising from the chair without using his arms.     Deep tendon reflexes absent.     Sensory: Light touch intact.  Allodynia to the temperature of the tuning fork.  Vibration reduced to the ankles.        Results/Data:   10/1/24:  HbA1c 6.3    7/24/24:   Hgb 12.8  MCV 91  Platelets 204  BUN 13  Cr 1.06    Cologuard June 2024:  Component  Ref Range & Units 6/25/24  7:34 PM 6/25/24  3:34 PM   Cologuard Result  N/A Sample Could Not Be Processed P Sample Could Not Be Processed 10 CM   Comment: The specimen received by the laboratory was not acceptable for testing. The patient will be contacted to initiate a new sample collection.       I have spent a total time of 33 minutes in caring for this patient on the day of the visit/encounter including Diagnostic results, Risks and benefits of tx options, Instructions for management, Patient and family education, Importance of tx compliance, Risk  factor reductions, Impressions, Documenting in the medical record, Reviewing / ordering tests, medicine, procedures  , and Obtaining or reviewing history  .

## 2024-10-08 ENCOUNTER — OFFICE VISIT (OUTPATIENT)
Dept: NEUROLOGY | Facility: CLINIC | Age: 64
End: 2024-10-08
Payer: COMMERCIAL

## 2024-10-08 VITALS
HEART RATE: 75 BPM | SYSTOLIC BLOOD PRESSURE: 122 MMHG | WEIGHT: 296.5 LBS | DIASTOLIC BLOOD PRESSURE: 78 MMHG | TEMPERATURE: 98.1 F | OXYGEN SATURATION: 96 % | BODY MASS INDEX: 40.21 KG/M2

## 2024-10-08 DIAGNOSIS — E11.40 DIABETIC NEUROPATHY (HCC): Primary | ICD-10-CM

## 2024-10-08 DIAGNOSIS — R51.9 CHRONIC DAILY HEADACHE: ICD-10-CM

## 2024-10-08 PROCEDURE — 99214 OFFICE O/P EST MOD 30 MIN: CPT | Performed by: PSYCHIATRY & NEUROLOGY

## 2024-10-08 PROCEDURE — G2211 COMPLEX E/M VISIT ADD ON: HCPCS | Performed by: PSYCHIATRY & NEUROLOGY

## 2024-10-08 RX ORDER — GABAPENTIN 800 MG/1
800 TABLET ORAL 4 TIMES DAILY
Qty: 360 TABLET | Refills: 1 | Status: SHIPPED | OUTPATIENT
Start: 2024-10-08

## 2024-10-08 RX ORDER — TOPIRAMATE 25 MG/1
TABLET, FILM COATED ORAL
Qty: 60 TABLET | Refills: 0 | Status: SHIPPED | OUTPATIENT
Start: 2024-10-08

## 2024-10-08 RX ORDER — GABAPENTIN 300 MG/1
300 CAPSULE ORAL DAILY PRN
Qty: 30 CAPSULE | Refills: 5 | Status: SHIPPED | OUTPATIENT
Start: 2024-10-08

## 2024-10-08 NOTE — PROGRESS NOTES
Patient ID: Aristeo Hall is a 63 y.o. male.    Assessment/Plan:    No problem-specific Assessment & Plan notes found for this encounter.       {Assess/PlanSmartLinks:58437}       Subjective:    HPI    {[facility] HPI texts:23574}    {Common ambulatory SmartLinks:52058}         Objective:    There were no vitals taken for this visit.    Physical Exam    Neurological Exam      ROS:    Review of Systems   Constitutional:  Negative for appetite change, fatigue and fever.   HENT: Negative.  Negative for hearing loss, tinnitus, trouble swallowing and voice change.    Eyes: Negative.  Negative for photophobia, pain and visual disturbance.   Respiratory: Negative.  Negative for shortness of breath.    Cardiovascular: Negative.  Negative for palpitations.   Gastrointestinal: Negative.  Negative for nausea and vomiting.   Endocrine: Negative.  Negative for cold intolerance.   Genitourinary: Negative.  Negative for dysuria, frequency and urgency.   Musculoskeletal:  Positive for gait problem (uses rolling walker). Negative for back pain, myalgias, neck pain and neck stiffness.   Skin: Negative.  Negative for rash.   Allergic/Immunologic: Negative.    Neurological:  Positive for dizziness, weakness (legs), numbness (leg pain b/l legs) and headaches. Negative for tremors, seizures, syncope, facial asymmetry, speech difficulty and light-headedness.   Hematological: Negative.  Does not bruise/bleed easily.   Psychiatric/Behavioral:  Positive for sleep disturbance (not s;eeping well d/t leg pain). Negative for confusion and hallucinations.

## 2024-10-08 NOTE — PATIENT INSTRUCTIONS
E-advice sent to patient via [employer] asking her how she is feeling after her course of this.   Stop duloxetine    Start topiramate 25 mg once day for 2 weeks then 1 tablet twice a day

## 2024-10-08 NOTE — ASSESSMENT & PLAN NOTE
Dr. Qiu recommended he take gabapentin as needed for the chronic daily headaches.  He can continue with 300 milligrams daily as needed.  The addition of topiramate may also be helpful.    Orders:    gabapentin (NEURONTIN) 300 mg capsule; Take 1 capsule (300 mg total) by mouth daily as needed (headache)

## 2024-10-08 NOTE — ASSESSMENT & PLAN NOTE
On exam, he has components of both large and small fiber neuropathy.  The only clear underlying etiology is diabetes.  B12, B1, B6, Lyme, zinc and immunofixation were all unremarkable.  He has tried gabapentin up to 3200 mg daily without relief.  He tried Lyrica in the past which was also not helpful.  I am reluctant to try a tricyclic antidepressant because of borderline elevated QTc.  He felt the duloxetine was too sedating.  He has tried lidocaine ointment which caused more pain than improvement due to the pain from the actual application.  He has a spinal cord stimulator in place which has not been helpful. Given the symmetric sensory loss without clear trauma to the legs, CRPS seems less likely.    Recommendations:  -Continue gabapentin 800 mg 4 times daily  -Counseled that he should not increase by a full tablet as he will exceed the daily recommended dosage  -Discontinue duloxetine as a cause sedation  -Trial of topiramate.  Start 25 mg nightly and then increase to 25 mg twice daily after 2 weeks.  We will have to follow cognitive function but he should not have an issue at lower doses.  He denies history of kidney stones.  Lab Results   Component Value Date    HGBA1C 6.3 10/01/2024       Orders:    gabapentin (NEURONTIN) 800 mg tablet; Take 1 tablet (800 mg total) by mouth 4 (four) times a day    topiramate (Topamax) 25 mg tablet; Take 1 tablet once a day for 2 weeks. Then take 1 tablet in the morning and one tablet in the evening.

## 2024-10-19 DIAGNOSIS — E11.40 TYPE 2 DIABETES MELLITUS WITH DIABETIC NEUROPATHY, WITHOUT LONG-TERM CURRENT USE OF INSULIN (HCC): ICD-10-CM

## 2024-11-04 ENCOUNTER — TELEPHONE (OUTPATIENT)
Dept: NEUROLOGY | Facility: CLINIC | Age: 64
End: 2024-11-04

## 2024-11-04 NOTE — TELEPHONE ENCOUNTER
Received refill request from [facility] pharmacy for medication refill Topiramate 25mg.

## 2024-11-05 DIAGNOSIS — E11.40 DIABETIC NEUROPATHY (HCC): ICD-10-CM

## 2024-11-05 NOTE — TELEPHONE ENCOUNTER
Cc chart    Reason for call:   [x] Refill   [] Prior Auth  [] Other:     Office:   [] PCP/Provider -   [x] Specialty/Provider - : Charlotte Cole MD -neurology     Medication:     topiramate (Topamax) 25 mg tablet       Dose/Frequency: Take 1 tablet once a day for 2 weeks. Then take 1 tablet in the morning and one tablet in the evening.     Pharmacy: [facility] - [place] - [address]     Does the patient have enough for 3 days?   [] Yes   [] No - Send as HP to POD

## 2024-11-06 ENCOUNTER — OFFICE VISIT (OUTPATIENT)
Dept: GASTROENTEROLOGY | Facility: CLINIC | Age: 64
End: 2024-11-06
Payer: COMMERCIAL

## 2024-11-06 VITALS
SYSTOLIC BLOOD PRESSURE: 139 MMHG | BODY MASS INDEX: 39.28 KG/M2 | HEIGHT: 72 IN | DIASTOLIC BLOOD PRESSURE: 63 MMHG | WEIGHT: 290 LBS

## 2024-11-06 DIAGNOSIS — Z90.3 H/O GASTRIC SLEEVE: Primary | ICD-10-CM

## 2024-11-06 PROCEDURE — G2211 COMPLEX E/M VISIT ADD ON: HCPCS | Performed by: INTERNAL MEDICINE

## 2024-11-06 PROCEDURE — 99213 OFFICE O/P EST LOW 20 MIN: CPT | Performed by: INTERNAL MEDICINE

## 2024-11-06 RX ORDER — TOPIRAMATE 25 MG/1
25 TABLET, FILM COATED ORAL 2 TIMES DAILY
Qty: 60 TABLET | Refills: 5 | Status: SHIPPED | OUTPATIENT
Start: 2024-11-06

## 2024-11-06 NOTE — PROGRESS NOTES
[facility] - Outpatient Follow-up Note  Aristeo Hall 63 y.o. male MRN: [id number]  Encounter: [id number]    ASSESSMENT AND PLAN:      1. H/O gastric sleeve  Doing well post magnetic sphincter augmentation.  He has follow-up with surgery in a couple days      Follow up appointment: As needed    _______________________      No chief complaint on file.      HPI:   Patient is a 63 y.o. male with a significant PMH of sleeve gastrectomy and recent magnetic sphincter augmentation by bariatric surgery presenting for follow up regarding his reflux.  He is doing quite well after the Linx device was placed in the summer.  He is no longer requiring antinausea or antisecretory therapy.  He denies dysphagia.  He is following up with surgery in a couple of days    Historical Information   Past Medical History:   Diagnosis Date    Acute on chronic diastolic congestive heart failure (HCC)     Altered gait     Alzheimer disease (HCC)     per patients,,early onset     Angina pectoris (HCC)     Anxiety     Arthritis     Brain aneurysm     coils placed    Cardiac disease     Chest pain 01/13/2016    Chronic kidney disease     Chronic pain     back/ right groin and rle- has morphine pump    Constipation     COPD (chronic obstructive pulmonary disease) (HCC)     Coronary artery disease     Decubital ulcer     sacral decub-occured 5/2019-sees wound care/debide in OR today 6/6/2019    Dependent on walker for ambulation     w/c for long distance    Depression     Diabetes mellitus (HCC)     insulin dependent -- pt states no longer dependent on insulin    Difficulty walking     Dizziness     occ    Dysphagia     Enlarged prostate     Esophageal stricture In file    Esophageal varices (HCC)     Fall     Fall at home 05/03/2019    GERD (gastroesophageal reflux disease)     Heart failure (HCC)     Hiatal hernia     Hx of gastric bypass 11/19/2018    states Nov 2019    Hypercholesterolemia      Hypertension     Ingrown toenail     MI (myocardial infarction) ([facility])     2017- stents x2    Migraine     Morbid obesity ([facility])     gastric bypass sleeve 11/2018-wt loss 125 lb    Myocardial infarction ([facility]) 2003    Neuropathy     Obesity 2003    Oxygen dependent     Q HS  2LPM with CPAP and prn during day 2-3 LPM  ( pt states no longer needs oxygen )    Pressure injury of skin     Pressure injury of skin of sacral region 06/03/2019    Added automatically from request for surgery [id number]    Renal disorder     Shortness of breath     Skin abnormality     sacral wound - covered with pad    Sleep apnea     couldnt tolerate CPAP    Stented coronary artery     Stroke ([facility])     vision loss b/l  2005, residual R leg weakness    Type 2 diabetes mellitus with diabetic neuropathy, with long-term current use of insulin ([facility]) 10/18/2019    Type 2 diabetes mellitus with renal complication ([facility])     insulin dependent    Type 2 diabetes mellitus, with long-term current use of insulin ([facility]) 10/11/2017    Transitioned From: Diabetes mellitus type 2, uncontrolled    Urinary frequency     Use of cane as ambulatory aid     Wears dentures     Wears glasses     Wears glasses     Wheelchair dependent     states uses walker & cane     Past Surgical History:   Procedure Laterality Date    BACK SURGERY      BRAIN SURGERY      CARDIAC CATHETERIZATION      with stents    CARDIAC CATHETERIZATION N/A 8/18/2023    Procedure: Cardiac Coronary Angiogram;  Surgeon: Horacio Weiner MD;  Location: [facility];  Service: Cardiology    CEREBRAL ANEURYSM REPAIR      with coils    COLONOSCOPY      ESOPHAGOGASTRODUODENOSCOPY N/A 7/1/2016    Procedure: ESOPHAGOGASTRODUODENOSCOPY (EGD);  Surgeon: Reno Christiansen MD;  Location: [facility] GI [facility];  Service:     GASTRIC BYPASS  11/19/2018    HERNIA REPAIR      HIATAL HERNIA REPAIR      INFUSION PUMP IMPLANTATION Left     morphine    INTRATHECAL PUMP IMPLANTATION Left 7/9/2020    Procedure: REVISION INTRATHECAL PAIN  PUMP POCKET, LEFT ABDOMEN;  Surgeon: Homero Cho MD;  Location: [facility];  Service: Neurosurgery    KNEE ARTHROSCOPY Right     KNEE ARTHROSCOPY Right     PERONEAL NERVE DECOMPRESSION Right     SC DEBRIDEMENT OPEN WOUND FIRST 20 SQ CM/< N/A 6/6/2019    Procedure: EXCISIONAL DEBRIDEMENT OF SACRAL DECUBITUS ULCER;  Surgeon: Siddhartha Omer MD;  Location: [facility];  Service: General    SC ESOPHAGOGASTRODUODENOSCOPY TRANSORAL DIAGNOSTIC N/A 2/27/2017    Procedure: ESOPHAGOGASTRODUODENOSCOPY (EGD);  Surgeon: Reno Christiansen MD;  Location: [facility];  Service: Gastroenterology    SC ESOPHAGOGASTRODUODENOSCOPY TRANSORAL DIAGNOSTIC N/A 8/23/2018    Procedure: ESOPHAGOGASTRODUODENOSCOPY (EGD) with biopsy;  Surgeon: Reno Christiansen MD;  [facility]: [facility];  Service: Gastroenterology    SC IMPLTJ/RPLCMT ITHCL/EDRL DRUG NFS PRGRBL PUMP Left 10/13/2020    Procedure: EXPLORATION OF INTRATHECAL PAIN PUMP SYSTEM INTEGRITY FOR POSSIBLE REPLACEMENT OF CATHETER AND PUMP.;  Surgeon: Homero Cho MD;  Location: [facility];  Service: Neurosurgery    SC IMPLTJ/RPLCMT ITHCL/EDRL DRUG NFS SUBQ RSVR N/A 1/19/2017    Procedure: REMOVAL / EXCHANGE INTRATHECAL PAIN PUMP;  Surgeon: Que Leonard MD;  [facility];  Service: Orthopedics    SC IMPLTJ/RPLCMT ITHCL/EDRL DRUG NFS SUBQ RSVR N/A 5/16/2016    Procedure: REPLACEMENT AND PROGRAM PUMP ;  Surgeon: Que Leonard MD;  [facility];  Service: Orthopedics    SC LAPS RPR PARAESPHGL HRNA INCL FUNDPLSTY W/MESH N/A 7/23/2024    Procedure: REPAIR HERNIA PARAESOPHAGEAL  W ROBOTICS & MAGNETIC SPINCTER AUGMENTATION DEVISE;  Surgeon: Mansi Mauro MD;  [facility];  Service: Bariatrics    SC PRQ IMPLTJ NSTIM ELECTRODE ARRAY EPIDURAL Right 3/17/2021    Procedure: INSERTION THORACIC DORSAL COLUMN SPINAL CORD STIMULATOR PERCUTANEOUS W IMPLANTABLE PULSE GENERATOR, RIGHT;  Surgeon: Homero Cho MD;  Location: [facility];  Service: Neurosurgery     Social History     Substance and  Sexual Activity   Alcohol Use Not Currently     Social History     Substance and Sexual Activity   Drug Use Not Currently    Types: Marijuana    Comment: Medical card is for CBD cream . states no THC use     Social History     Tobacco Use   Smoking Status Never   Smokeless Tobacco Never     Family History   Problem Relation Age of Onset    Diabetes unspecified Mother     Diabetes Mother     Heart attack Father     Heart disease Father     Hypertension Father     Stroke Father     Diabetes unspecified Brother     Depression Brother     Mental illness Brother     COPD Brother     Drug abuse Brother     Bipolar disorder Brother     Diabetes unspecified Brother     Diabetes unspecified Maternal Grandmother     Diabetes unspecified Paternal Grandmother     Diabetes unspecified Paternal Uncle     ADD / ADHD Cousin     Colon cancer Neg Hx     Colon polyps Neg Hx          Current Outpatient Medications:     albuterol (2.5 mg/3 mL) 0.083 % nebulizer solution    amLODIPine (NORVASC) 5 mg tablet    ammonium lactate (LAC-HYDRIN) 12 % cream    aspirin 81 mg chewable tablet    atorvastatin (LIPITOR) 80 mg tablet    baclofen 10 mg tablet    busPIRone (BUSPAR) 5 mg tablet    CALCIUM PO    carvedilol (COREG) 12.5 mg tablet    clopidogrel (PLAVIX) 75 mg tablet    Continuous Blood Gluc Transmit (Dexcom G6 Transmitter) MISC    Continuous Glucose Sensor (Dexcom G6 Sensor) MISC    docusate sodium (COLACE) 100 mg capsule    DULoxetine (Cymbalta) 30 mg delayed release capsule    Empagliflozin (JARDIANCE) 10 MG TABS tablet    ergocalciferol (VITAMIN D2) 50,000 units    Fluticasone-Salmeterol (Advair) 500-50 mcg/dose inhaler    folic acid (FOLVITE) 1 mg tablet    furosemide (LASIX) 40 mg tablet    gabapentin (NEURONTIN) 300 mg capsule    gabapentin (NEURONTIN) 800 mg tablet    hydrocortisone 1 % lotion    lidocaine (LIDODERM) 5 %    losartan (COZAAR) 50 mg tablet    methocarbamol (ROBAXIN) 750 mg tablet    naloxegol oxalate (MOVANTIK) 25 MG  tablet    nitroglycerin (NITROSTAT) 0.4 mg SL tablet    nystatin (MYCOSTATIN) cream    potassium chloride (Klor-Con M20) 20 mEq tablet    ranolazine (RANEXA) 1000 MG SR tablet    tamsulosin (FLOMAX) 0.4 mg    tirzepatide (Mounjaro) 2.5 MG/0.5ML    tirzepatide (Mounjaro) 5 MG/0.5ML    topiramate (Topamax) 25 mg tablet    traZODone (DESYREL) 100 mg tablet    traZODone (DESYREL) 50 mg tablet    vitamin B-12 (VITAMIN B-12) 1,000 mcg tablet  No Known Allergies  Reviewed medications and allergies and updated as indicated    PHYSICAL EXAM:    Blood pressure 139/63, height 6' (1.829 m), weight 132 kg (290 lb). Body mass index is 39.33 kg/m².  General Appearance: NAD, cooperative, alert  Eyes: Anicteric  GI:  Soft, non-tender, non-distended; normal bowel sounds; no masses, no organomegaly   Rectal: Deferred  Musculoskeletal: No edema.  Skin:  No jaundice    Lab Results:   Lab Results   Component Value Date    WBC 13.17 (H) 07/24/2024    WBC 12.44 (H) 04/05/2024    WBC 10.33 (H) 04/01/2024    HGB 13.3 07/24/2024    HGB 12.8 07/24/2024    HGB 15.0 04/05/2024    MCV 91 07/24/2024     07/24/2024     04/05/2024     04/01/2024     Lab Results   Component Value Date     (L) 10/21/2015    K 3.8 07/24/2024     07/24/2024    CO2 26 07/24/2024    ANIONGAP 9 10/21/2015    BUN 13 07/24/2024    CREATININE 1.06 07/24/2024    GLUCOSE 224 (H) 10/21/2015    GLUF 72 01/05/2024    CALCIUM 8.5 07/24/2024    CORRECTEDCA 8.5 08/28/2023    AST 17 04/05/2024    AST 13 08/28/2023    AST 38 08/23/2023    ALT 24 04/05/2024    ALT 15 08/28/2023    ALT 27 08/23/2023    ALKPHOS 75 04/05/2024    ALKPHOS 48 08/28/2023    ALKPHOS 63 08/23/2023    PROT 5.9 (L) 10/19/2015    BILITOT 0.81 10/19/2015    BILITOT 0.82 10/18/2015    BILITOT 0.3 01/05/2015    EGFR 75 07/24/2024     Lab Results   Component Value Date    IRON 41 04/09/2019    FERRITIN 180 04/09/2019     Lab Results   Component Value Date    LIPASE 72 (L) 07/09/2022        Radiology Results:   No results found.

## 2024-11-09 DIAGNOSIS — I25.10 CORONARY ARTERY DISEASE INVOLVING NATIVE CORONARY ARTERY OF NATIVE HEART WITHOUT ANGINA PECTORIS: ICD-10-CM

## 2024-11-11 RX ORDER — NITROGLYCERIN 0.4 MG/1
0.4 TABLET SUBLINGUAL
Qty: 25 TABLET | Refills: 2 | Status: SHIPPED | OUTPATIENT
Start: 2024-11-11

## 2024-11-14 ENCOUNTER — OFFICE VISIT (OUTPATIENT)
Dept: FAMILY MEDICINE CLINIC | Facility: HOSPITAL | Age: 64
End: 2024-11-14
Payer: COMMERCIAL

## 2024-11-14 ENCOUNTER — APPOINTMENT (OUTPATIENT)
Dept: LAB | Facility: HOSPITAL | Age: 64
End: 2024-11-14
Payer: COMMERCIAL

## 2024-11-14 VITALS
HEIGHT: 72 IN | DIASTOLIC BLOOD PRESSURE: 82 MMHG | BODY MASS INDEX: 39.44 KG/M2 | WEIGHT: 291.2 LBS | TEMPERATURE: 98.2 F | SYSTOLIC BLOOD PRESSURE: 134 MMHG | HEART RATE: 82 BPM | OXYGEN SATURATION: 98 %

## 2024-11-14 DIAGNOSIS — Z12.11 SCREEN FOR COLON CANCER: ICD-10-CM

## 2024-11-14 DIAGNOSIS — Z98.84 BARIATRIC SURGERY STATUS: ICD-10-CM

## 2024-11-14 DIAGNOSIS — E78.00 PURE HYPERCHOLESTEROLEMIA: ICD-10-CM

## 2024-11-14 DIAGNOSIS — Z00.00 MEDICARE ANNUAL WELLNESS VISIT, SUBSEQUENT: ICD-10-CM

## 2024-11-14 DIAGNOSIS — Z59.86 FINANCIALLY INSECURE: ICD-10-CM

## 2024-11-14 DIAGNOSIS — E11.65 TYPE 2 DIABETES MELLITUS WITH HYPERGLYCEMIA, WITHOUT LONG-TERM CURRENT USE OF INSULIN (HCC): ICD-10-CM

## 2024-11-14 DIAGNOSIS — R68.2 DRY MOUTH: ICD-10-CM

## 2024-11-14 DIAGNOSIS — I50.32 CHRONIC DIASTOLIC CONGESTIVE HEART FAILURE (HCC): ICD-10-CM

## 2024-11-14 DIAGNOSIS — M79.2 NEUROPATHIC PAIN: ICD-10-CM

## 2024-11-14 DIAGNOSIS — E55.9 VITAMIN D DEFICIENCY: ICD-10-CM

## 2024-11-14 DIAGNOSIS — E11.40 TYPE 2 DIABETES MELLITUS WITH DIABETIC NEUROPATHY, WITHOUT LONG-TERM CURRENT USE OF INSULIN (HCC): ICD-10-CM

## 2024-11-14 DIAGNOSIS — F33.1 MAJOR DEPRESSIVE DISORDER, RECURRENT, MODERATE (HCC): Chronic | ICD-10-CM

## 2024-11-14 DIAGNOSIS — J44.9 CHRONIC OBSTRUCTIVE PULMONARY DISEASE, UNSPECIFIED COPD TYPE (HCC): Primary | ICD-10-CM

## 2024-11-14 LAB
25(OH)D3 SERPL-MCNC: 10.4 NG/ML (ref 30–100)
ALBUMIN SERPL BCG-MCNC: 3.5 G/DL (ref 3.5–5)
ALP SERPL-CCNC: 99 U/L (ref 34–104)
ALT SERPL W P-5'-P-CCNC: 21 U/L (ref 7–52)
ANION GAP SERPL CALCULATED.3IONS-SCNC: 9 MMOL/L (ref 4–13)
AST SERPL W P-5'-P-CCNC: 19 U/L (ref 13–39)
BASOPHILS # BLD AUTO: 0.05 THOUSANDS/ÂΜL (ref 0–0.1)
BASOPHILS NFR BLD AUTO: 0 % (ref 0–1)
BILIRUB SERPL-MCNC: 0.64 MG/DL (ref 0.2–1)
BUN SERPL-MCNC: 11 MG/DL (ref 5–25)
CALCIUM SERPL-MCNC: 9.1 MG/DL (ref 8.4–10.2)
CHLORIDE SERPL-SCNC: 99 MMOL/L (ref 96–108)
CHOLEST SERPL-MCNC: 176 MG/DL (ref ?–200)
CO2 SERPL-SCNC: 31 MMOL/L (ref 21–32)
CREAT SERPL-MCNC: 1.03 MG/DL (ref 0.6–1.3)
EOSINOPHIL # BLD AUTO: 0.6 THOUSAND/ÂΜL (ref 0–0.61)
EOSINOPHIL NFR BLD AUTO: 5 % (ref 0–6)
ERYTHROCYTE [DISTWIDTH] IN BLOOD BY AUTOMATED COUNT: 12.8 % (ref 11.6–15.1)
EST. AVERAGE GLUCOSE BLD GHB EST-MCNC: 134 MG/DL
GFR SERPL CREATININE-BSD FRML MDRD: 78 ML/MIN/1.73SQ M
GLUCOSE P FAST SERPL-MCNC: 121 MG/DL (ref 65–99)
HBA1C MFR BLD: 6.3 %
HCT VFR BLD AUTO: 50 % (ref 36.5–49.3)
HDLC SERPL-MCNC: 27 MG/DL
HGB BLD-MCNC: 15.9 G/DL (ref 12–17)
IMM GRANULOCYTES # BLD AUTO: 0.07 THOUSAND/UL (ref 0–0.2)
IMM GRANULOCYTES NFR BLD AUTO: 1 % (ref 0–2)
LDLC SERPL CALC-MCNC: 94 MG/DL (ref 0–100)
LYMPHOCYTES # BLD AUTO: 2.18 THOUSANDS/ÂΜL (ref 0.6–4.47)
LYMPHOCYTES NFR BLD AUTO: 18 % (ref 14–44)
MCH RBC QN AUTO: 30.3 PG (ref 26.8–34.3)
MCHC RBC AUTO-ENTMCNC: 31.8 G/DL (ref 31.4–37.4)
MCV RBC AUTO: 95 FL (ref 82–98)
MONOCYTES # BLD AUTO: 0.61 THOUSAND/ÂΜL (ref 0.17–1.22)
MONOCYTES NFR BLD AUTO: 5 % (ref 4–12)
NEUTROPHILS # BLD AUTO: 8.73 THOUSANDS/ÂΜL (ref 1.85–7.62)
NEUTS SEG NFR BLD AUTO: 71 % (ref 43–75)
NRBC BLD AUTO-RTO: 0 /100 WBCS
PHOSPHATE SERPL-MCNC: 2.7 MG/DL (ref 2.3–4.1)
PLATELET # BLD AUTO: 277 THOUSANDS/UL (ref 149–390)
PMV BLD AUTO: 10.7 FL (ref 8.9–12.7)
POTASSIUM SERPL-SCNC: 3.3 MMOL/L (ref 3.5–5.3)
PROT SERPL-MCNC: 7.3 G/DL (ref 6.4–8.4)
PTH-INTACT SERPL-MCNC: 193.2 PG/ML (ref 12–88)
RBC # BLD AUTO: 5.25 MILLION/UL (ref 3.88–5.62)
SODIUM SERPL-SCNC: 139 MMOL/L (ref 135–147)
TRIGL SERPL-MCNC: 273 MG/DL (ref ?–150)
TSH SERPL DL<=0.05 MIU/L-ACNC: 2.75 UIU/ML (ref 0.45–4.5)
WBC # BLD AUTO: 12.24 THOUSAND/UL (ref 4.31–10.16)

## 2024-11-14 PROCEDURE — 82306 VITAMIN D 25 HYDROXY: CPT

## 2024-11-14 PROCEDURE — 36415 COLL VENOUS BLD VENIPUNCTURE: CPT

## 2024-11-14 PROCEDURE — 80053 COMPREHEN METABOLIC PANEL: CPT

## 2024-11-14 PROCEDURE — 86235 NUCLEAR ANTIGEN ANTIBODY: CPT

## 2024-11-14 PROCEDURE — 85025 COMPLETE CBC W/AUTO DIFF WBC: CPT

## 2024-11-14 PROCEDURE — 83036 HEMOGLOBIN GLYCOSYLATED A1C: CPT

## 2024-11-14 PROCEDURE — G0439 PPPS, SUBSEQ VISIT: HCPCS | Performed by: NURSE PRACTITIONER

## 2024-11-14 PROCEDURE — 84100 ASSAY OF PHOSPHORUS: CPT

## 2024-11-14 PROCEDURE — 84443 ASSAY THYROID STIM HORMONE: CPT

## 2024-11-14 PROCEDURE — 80061 LIPID PANEL: CPT

## 2024-11-14 PROCEDURE — 83970 ASSAY OF PARATHORMONE: CPT

## 2024-11-14 PROCEDURE — 99214 OFFICE O/P EST MOD 30 MIN: CPT | Performed by: NURSE PRACTITIONER

## 2024-11-14 RX ORDER — BUSPIRONE HYDROCHLORIDE 5 MG/1
5 TABLET ORAL 2 TIMES DAILY
Qty: 180 TABLET | Refills: 1 | Status: SHIPPED | OUTPATIENT
Start: 2024-11-14

## 2024-11-14 SDOH — ECONOMIC STABILITY - INCOME SECURITY: FINANCIAL INSECURITY: Z59.86

## 2024-11-14 NOTE — ASSESSMENT & PLAN NOTE
Depression Screening Follow-up Plan: Patient's depression screening was positive with a PHQ-9 score of 19. Patient with underlying depression and was advised to continue current medications as prescribed.    He is not taking Buspar. This was refilled today.     Orders:    busPIRone (BUSPAR) 5 mg tablet; Take 1 tablet (5 mg total) by mouth 2 (two) times a day

## 2024-11-14 NOTE — PROGRESS NOTES
Name: Aristeo Hall      : 1960      MRN: [id number]  Encounter Provider: JACLYN Ospina  Encounter Date: 2024   Encounter department: [facility] PRIMARY CARE [address]     Assessment & Plan  Chronic obstructive pulmonary disease, unspecified COPD type (HCC)  Managed by pulmonary.   F/U appointment scheduled.   Continue on current regimen.        Type 2 diabetes mellitus with hyperglycemia, without long-term current use of insulin (HCC)    Lab Results   Component Value Date    HGBA1C 6.3 10/01/2024   Managed by endocrinology.   He is in need of DM eye exam. Iris exam last vist was not gradable.   He needs to reschedule his appointment. Number for [facility] given.   UTD with foot exam.          Chronic diastolic congestive heart failure (HCC)  Wt Readings from Last 3 Encounters:   24 132 kg (291 lb 3.2 oz)   24 132 kg (290 lb)   10/08/24 134 kg (296 lb 8 oz)     Managed by cardiology.   F/U appointment is scheduled.                Major depressive disorder, recurrent, moderate (HCC)  Depression Screening Follow-up Plan: Patient's depression screening was positive with a PHQ-9 score of 19. Patient with underlying depression and was advised to continue current medications as prescribed.    He is not taking Buspar. This was refilled today.     Orders:    busPIRone (BUSPAR) 5 mg tablet; Take 1 tablet (5 mg total) by mouth 2 (two) times a day    Pure hypercholesterolemia  FLP is pending.          Medicare annual wellness visit, subsequent         Financially insecure  Impacting his mood.   He is agreeable to social work intervention. Referral placed.   Orders:    Ambulatory Referral to Social Work Care Management Program; Future    Screen for colon cancer  First Cologuard was unable to be processed.   2nd Cologuard was taken off his porch or delivered to wrong address.   Reordered again.   Orders:    Cologuard       Preventive health issues were discussed with  patient, and age appropriate screening tests were ordered as noted in patient's After Visit Summary. Personalized health advice and appropriate referrals for health education or preventive services given if needed, as noted in patient's After Visit Summary.    History of Present Illness     Continues with sob and feels like elephant on his chest. Using Advair daily. Uses albuterol which helps symptoms.   He is very worried about money. Unable to afford food. Not sure if he will be able to heat his home. Has autistic son at home. Largely impacting his depressed mood. He is not sure if he is taking Buspar. No refills have been issued from this office.   He has appointments scheduled with pulmonary, endo and cardiology. Had labs done this morning-pending.   Follows with neurology for neuropathy.        Patient Care Team:  JACLYN Ospina as PCP - General (Family Medicine)  Becky Dias MD as PCP - Endocrinology (Endocrinology)  MD Trinity Melgar PA-C Michael Nimeh, DO Jennifer Anne Banzhof, DO Paul Gulotta, MD Robert Dolansky, DO Rati Jessica Bal, CRNP as Nurse Practitioner (Endocrinology)  Lopez Montano DO (Gastroenterology)  Amanda Dempsey PA-C as Physician Assistant (Gastroenterology)    Review of Systems   Constitutional:  Positive for fatigue. Negative for unexpected weight change.   Eyes:  Negative for visual disturbance.   Respiratory:  Positive for chest tightness and shortness of breath.    Cardiovascular:  Negative for chest pain, palpitations and leg swelling.   Musculoskeletal:  Negative for myalgias.   Neurological:  Positive for numbness. Negative for dizziness, weakness, light-headedness and headaches.     Medical History Reviewed by provider this encounter:       Annual Wellness Visit Questionnaire   Aristeo is here for his Subsequent Wellness visit.     Health Risk Assessment:   Patient rates overall health as poor. Patient feels that their physical health  rating is slightly worse. Patient is dissatisfied with their life. Eyesight was rated as slightly worse. Hearing was rated as same. Patient feels that their emotional and mental health rating is slightly worse. Patients states they are sometimes angry. Patient states they are always unusually tired/fatigued. Pain experienced in the last 7 days has been a lot. Patient's pain rating has been 10/10. Patient states that he has experienced weight loss or gain in last 6 months.     Depression Screening:   PHQ-9 Score: 19      Fall Risk Screening:   In the past year, patient has experienced: history of falling in past year    Number of falls: 2 or more  Injured during fall?: No    Feels unsteady when standing or walking?: Yes    Worried about falling?: Yes      Home Safety:  Patient has trouble with stairs inside or outside of their home. Patient has working smoke alarms and has working carbon monoxide detector. Home safety hazards include: none.     Nutrition:   Current diet is Regular.     Medications:   Patient is currently taking over-the-counter supplements. OTC medications include: see medication list. Patient is able to manage medications.     Activities of Daily Living (ADLs)/Instrumental Activities of Daily Living (IADLs):   Walk and transfer into and out of bed and chair?: Yes  Dress and groom yourself?: Yes    Bathe or shower yourself?: Yes    Feed yourself? Yes  Do your laundry/housekeeping?: Yes  Manage your money, pay your bills and track your expenses?: Yes  Make your own meals?: Yes    Do your own shopping?: Yes    Previous Hospitalizations:   Any hospitalizations or ED visits within the last 12 months?: Yes    How many hospitalizations have you had in the last year?: 1-2    Advance Care Planning:   Living will: Yes    Advanced directive: Yes    Five wishes given: No      Cognitive Screening:   Provider or family/friend/caregiver concerned regarding cognition?: No    PREVENTIVE SCREENINGS       Cardiovascular Screening:    General: Screening Not Indicated and History Lipid Disorder      Diabetes Screening:     General: Screening Not Indicated and History Diabetes      Colorectal Cancer Screening:       Due for: Cologuard      Prostate Cancer Screening:    General: Screening Current      Osteoporosis Screening:    General: Screening Not Indicated      Lung Cancer Screening:     General: Screening Not Indicated      Hepatitis C Screening:    General: Screening Current    Screening, Brief Intervention, and Referral to Treatment (SBIRT)    Screening      AUDIT-C Screenin) How often did you have a drink containing alcohol in the past year? never  2) How many drinks did you have on a typical day when you were drinking in the past year? 0  3) How often did you have 6 or more drinks on one occasion in the past year? never    AUDIT-C Score: 0  Interpretation: Score 0-3 (male): Negative screen for alcohol misuse    Single Item Drug Screening:  How often have you used an illegal drug (including marijuana) or a prescription medication for non-medical reasons in the past year? never    Single Item Drug Screen Score: 0  Interpretation: Negative screen for possible drug use disorder    SDOH Risk Assessment  Social determinants of health (SDOH) risk assesment tool was completed. The tool at a minimum covered housing stability, food insecurity, transportation needs, and utility difficulty. Patient had at risk responses for the following SDOH domains: food insecurity and utilities.     Review of Current Opioid Use  Opioid Risk Tool (ORT) Score: 10  Opioid Risk Tool (ORT) Interpretation: Score 8+: High risk for opioid misuse    Social Drivers of Health     Financial Resource Strain: High Risk (2023)    Overall Financial Resource Strain (CARDIA)     Difficulty of Paying Living Expenses: Very hard   Food Insecurity: Food Insecurity Present (2024)    Hunger Vital Sign     Worried About Running Out of Food in the  Last Year: Often true     Ran Out of Food in the Last Year: Often true   Transportation Needs: No Transportation Needs (11/14/2024)    PRAPARE - Transportation     Lack of Transportation (Medical): No     Lack of Transportation (Non-Medical): No   Housing Stability: Low Risk  (11/14/2024)    Housing Stability Vital Sign     Unable to Pay for Housing in the Last Year: No     Number of Times Moved in the Last Year: 1     Homeless in the Last Year: No   Utilities: At Risk (11/14/2024)    [facility] Utilities     Threatened with loss of utilities: Yes     Vision Screening    Right eye Left eye Both eyes   Without correction 20/50 2050 20/40   With correction          Objective   /82 (Patient Position: Sitting, Cuff Size: Standard)   Pulse 82   Temp 98.2 °F (36.8 °C) (Temporal)   Ht 6' (1.829 m)   Wt 132 kg (291 lb 3.2 oz)   SpO2 98%   BMI 39.49 kg/m²     Physical Exam  Vitals reviewed.   Constitutional:       Appearance: Normal appearance. He is well-developed. He is obese.   Cardiovascular:      Rate and Rhythm: Normal rate and regular rhythm.      Pulses:           Carotid pulses are 2+ on the right side and 2+ on the left side.     Heart sounds: Normal heart sounds. No murmur heard.  Pulmonary:      Effort: Pulmonary effort is normal.      Breath sounds: Normal breath sounds.   Skin:     General: Skin is warm and dry.   Neurological:      Mental Status: He is alert and oriented to person, place, and time.   Psychiatric:         Mood and Affect: Mood normal.         Behavior: Behavior normal.         Thought Content: Thought content normal.         Judgment: Judgment normal.       Administrative Statements   I have spent a total time of 20 minutes in caring for this patient on the day of the visit/encounter including Instructions for management, Risk factor reductions, Documenting in the medical record, Reviewing / ordering tests, medicine, procedures  , and Obtaining or reviewing history  .

## 2024-11-14 NOTE — ASSESSMENT & PLAN NOTE
Wt Readings from Last 3 Encounters:   11/14/24 132 kg (291 lb 3.2 oz)   11/06/24 132 kg (290 lb)   10/08/24 134 kg (296 lb 8 oz)     Managed by cardiology.   F/U appointment is scheduled.

## 2024-11-14 NOTE — PROGRESS NOTES
Name: Aristeo Hall      : 1960      MRN: [id number]  Encounter Provider: JACLYN Ospina  Encounter Date: 2024   Encounter department: [facility] CARE [address]     Assessment & Plan       Preventive health issues were discussed with patient, and age appropriate screening tests were ordered as noted in patient's After Visit Summary. Personalized health advice and appropriate referrals for health education or preventive services given if needed, as noted in patient's After Visit Summary.    History of Present Illness     HPI   Patient Care Team:  JACLYN Ospina as PCP - General (Family Medicine)  Becky Dias MD as PCP - Endocrinology (Endocrinology)  MD Trinity Melgar PA-C Michael Nimeh, DO Jennifer Anne Banzhof, DO Paul Gulotta, MD Robert Dolansky, DO Rati Jessica Bal, CRNP as Nurse Practitioner (Endocrinology)  Lopez Montano DO (Gastroenterology)  Amanda Dempsey PA-C as Physician Assistant (Gastroenterology)    Review of Systems  Medical History Reviewed by provider this encounter:       Annual Wellness Visit Questionnaire   Annual Wellness Visit  Social Drivers of Health     Financial Resource Strain: High Risk (2023)    Overall Financial Resource Strain (CARDIA)     Difficulty of Paying Living Expenses: Very hard   Food Insecurity: Food Insecurity Present (2024)    Hunger Vital Sign     Worried About Running Out of Food in the Last Year: Often true     Ran Out of Food in the Last Year: Often true   Transportation Needs: No Transportation Needs (2024)    PRAPARE - Transportation     Lack of Transportation (Medical): No     Lack of Transportation (Non-Medical): No   Housing Stability: Low Risk  (2024)    Housing Stability Vital Sign     Unable to Pay for Housing in the Last Year: No     Number of Times Moved in the Last Year: 1     Homeless in the Last Year: No   Utilities: At Risk (2024)     [facility] Utilities     Threatened with loss of utilities: Yes     Vision Screening    Right eye Left eye Both eyes   Without correction 20/50 2050 20/40   With correction          Objective   /82 (Patient Position: Sitting, Cuff Size: Standard)   Pulse 82   Temp 98.2 °F (36.8 °C) (Temporal)   Ht 6' (1.829 m)   Wt 132 kg (291 lb 3.2 oz)   SpO2 98%   BMI 39.49 kg/m²     Physical Exam

## 2024-11-14 NOTE — ASSESSMENT & PLAN NOTE
Lab Results   Component Value Date    HGBA1C 6.3 10/01/2024   Managed by endocrinology.   He is in need of DM eye exam. Iris exam last vist was not gradable.   He needs to reschedule his appointment. [facility].   UTD with foot exam.

## 2024-11-15 ENCOUNTER — RESULTS FOLLOW-UP (OUTPATIENT)
Dept: BARIATRICS | Facility: CLINIC | Age: 64
End: 2024-11-15

## 2024-11-15 ENCOUNTER — PATIENT OUTREACH (OUTPATIENT)
Dept: CASE MANAGEMENT | Facility: OTHER | Age: 64
End: 2024-11-15

## 2024-11-15 DIAGNOSIS — R79.89 HIGH SERUM PARATHYROID HORMONE (PTH): ICD-10-CM

## 2024-11-15 DIAGNOSIS — E55.9 VITAMIN D DEFICIENCY: ICD-10-CM

## 2024-11-15 DIAGNOSIS — Z98.84 BARIATRIC SURGERY STATUS: Primary | ICD-10-CM

## 2024-11-15 LAB
ENA SS-A AB SER-ACNC: <0.2 AI (ref 0–0.9)
ENA SS-B AB SER-ACNC: <0.2 AI (ref 0–0.9)

## 2024-11-15 RX ORDER — ERGOCALCIFEROL 1.25 MG/1
50000 CAPSULE, LIQUID FILLED ORAL 2 TIMES WEEKLY
Qty: 24 CAPSULE | Refills: 0 | Status: SHIPPED | OUTPATIENT
Start: 2024-11-18

## 2024-11-16 NOTE — PROGRESS NOTES
KURTIS SIERRA received a referral from patient's PCP regarding need for assistance due to financial difficulties. Pt expressed that he does sometimes have difficulty with affording food. Pt does receive SNAP but the amount he receives is not sufficient for his household size therefore, he does end up having to spend a lot of money on groceries. KURTIS SIERRA offered to send pt a list of food pantries in his area-pt agreeable. Patient also reports being worried about being able to afford heat in his home this winter. Pt does report utilizing [employer] previously-his daughter is going to help him complete the application for this year. Pt denies being behind on his rent. Pt also states having some difficulty with [employer] bills, however, he was previously on the [employer] program and has now been told that he has exhausted the assistance the program is able to offer, and now his bill has significantly increased.  KURTIS SIERRA spoke with pt about [facility] [employer]-pt states he has utilized them before for assistance as well; they helped to replace his boiler KURTIS SIERRA determined there are no additional financial assistance programs available that pt does not already have or isnt already aware of. Pt denies interest in establishing mental health services at this time. Email sent to pt with list of food joshi/pantries via [employer]. Pt expressed gratitude for the call. Referral will be closed. KURTIS SIERRA will be available if needled via new order.

## 2024-11-18 ENCOUNTER — RESULTS FOLLOW-UP (OUTPATIENT)
Dept: FAMILY MEDICINE CLINIC | Facility: HOSPITAL | Age: 64
End: 2024-11-18

## 2024-11-18 DIAGNOSIS — I25.10 CORONARY ARTERY DISEASE INVOLVING NATIVE CORONARY ARTERY OF NATIVE HEART WITHOUT ANGINA PECTORIS: ICD-10-CM

## 2024-11-18 DIAGNOSIS — I50.32 CHRONIC DIASTOLIC CONGESTIVE HEART FAILURE (HCC): ICD-10-CM

## 2024-11-18 RX ORDER — POTASSIUM CHLORIDE 1500 MG/1
20 TABLET, EXTENDED RELEASE ORAL DAILY
Qty: 90 TABLET | Refills: 1 | Status: SHIPPED | OUTPATIENT
Start: 2024-11-18

## 2024-11-18 NOTE — TELEPHONE ENCOUNTER
----- Message from JACLYN Maldonado sent at 11/15/2024  8:41 AM EST -----  Please call pt to let him know his labs shows very high PTH and low vitamin D levels which can indicate bone loss. Recommend that you take 50,000 IU of vitamin D2 twice per week for 12 weeks. This will be sent to your pharmacy to . In addition, you need to take 1200 to 1500 mg of calcium citrate per day, in divided doses of 500 to 600 mg each.  After 12 weeks, switch to a maintenance dose of 5000 IU vitamin D3 per day and continue to take calcium daily.  Your levels will be checked again in 3 months.

## 2024-11-19 LAB
LEFT EYE DIABETIC RETINOPATHY: POSITIVE
RIGHT EYE DIABETIC RETINOPATHY: POSITIVE

## 2024-11-27 ENCOUNTER — APPOINTMENT (OUTPATIENT)
Dept: LAB | Facility: HOSPITAL | Age: 64
End: 2024-11-27
Payer: COMMERCIAL

## 2024-11-27 DIAGNOSIS — Z98.84 BARIATRIC SURGERY STATUS: ICD-10-CM

## 2024-11-27 DIAGNOSIS — R79.89 HIGH SERUM PARATHYROID HORMONE (PTH): ICD-10-CM

## 2024-11-27 DIAGNOSIS — Z00.00 WELL ADULT EXAM: ICD-10-CM

## 2024-11-27 DIAGNOSIS — E55.9 VITAMIN D DEFICIENCY: ICD-10-CM

## 2024-11-27 LAB
25(OH)D3 SERPL-MCNC: 13.5 NG/ML (ref 30–100)
CREAT UR-MCNC: 169.3 MG/DL
MICROALBUMIN UR-MCNC: 13.2 MG/L
MICROALBUMIN/CREAT 24H UR: 8 MG/G CREATININE (ref 0–30)
PTH-INTACT SERPL-MCNC: 174.9 PG/ML (ref 12–88)

## 2024-11-27 PROCEDURE — 82570 ASSAY OF URINE CREATININE: CPT

## 2024-11-27 PROCEDURE — 82306 VITAMIN D 25 HYDROXY: CPT

## 2024-11-27 PROCEDURE — 82043 UR ALBUMIN QUANTITATIVE: CPT

## 2024-11-27 PROCEDURE — 36415 COLL VENOUS BLD VENIPUNCTURE: CPT

## 2024-11-27 PROCEDURE — 83970 ASSAY OF PARATHORMONE: CPT

## 2024-11-29 ENCOUNTER — RESULTS FOLLOW-UP (OUTPATIENT)
Dept: BARIATRICS | Facility: CLINIC | Age: 64
End: 2024-11-29

## 2024-11-29 DIAGNOSIS — E55.9 VITAMIN D DEFICIENCY: Primary | ICD-10-CM

## 2024-11-29 DIAGNOSIS — R79.89 HIGH SERUM PARATHYROID HORMONE (PTH): ICD-10-CM

## 2024-11-29 DIAGNOSIS — Z98.84 BARIATRIC SURGERY STATUS: ICD-10-CM

## 2024-11-29 NOTE — TELEPHONE ENCOUNTER
----- Message from JACLYN Maldonado sent at 11/29/2024  7:49 AM EST -----  He obtained his labs too soon. I would need him to repeat his labs in 3 months after he completes his high dose Vitamin D prescription.

## 2024-11-29 NOTE — TELEPHONE ENCOUNTER
Spoke to pt let him know he has to take Vitamin D prescription first and do blood work ,Pt verbalized understanding now .

## 2024-12-01 DIAGNOSIS — I25.10 CORONARY ARTERY DISEASE INVOLVING NATIVE CORONARY ARTERY OF NATIVE HEART WITHOUT ANGINA PECTORIS: ICD-10-CM

## 2024-12-02 ENCOUNTER — VBI (OUTPATIENT)
Dept: ADMINISTRATIVE | Facility: OTHER | Age: 64
End: 2024-12-02

## 2024-12-02 NOTE — TELEPHONE ENCOUNTER
12/02/24 8:04 AM     Chart reviewed for CRC: Colonoscopy ; nothing is submitted to the patient's insurance at this time.     Ashley Duvall MA   [facility]

## 2024-12-03 RX ORDER — NITROGLYCERIN 0.4 MG/1
0.4 TABLET SUBLINGUAL
Qty: 25 TABLET | Refills: 0 | Status: SHIPPED | OUTPATIENT
Start: 2024-12-03

## 2024-12-05 ENCOUNTER — VBI (OUTPATIENT)
Dept: ADMINISTRATIVE | Facility: OTHER | Age: 64
End: 2024-12-05

## 2024-12-05 NOTE — TELEPHONE ENCOUNTER
12/05/24 12:26 PM     Chart reviewed for Hemoglobin A1c ; nothing is submitted to the patient's insurance at this time.     Ashley Duvall MA   [facility]

## 2024-12-06 ENCOUNTER — VBI (OUTPATIENT)
Dept: ADMINISTRATIVE | Facility: OTHER | Age: 64
End: 2024-12-06

## 2024-12-06 NOTE — TELEPHONE ENCOUNTER
12/06/24 3:21 PM     Chart reviewed for Diabetic Eye Exam ; nothing is submitted to the patient's insurance at this time.     Ashley Duvall MA   [facility]

## 2024-12-12 DIAGNOSIS — E11.40 TYPE 2 DIABETES MELLITUS WITH DIABETIC NEUROPATHY, WITHOUT LONG-TERM CURRENT USE OF INSULIN (HCC): ICD-10-CM

## 2024-12-12 RX ORDER — TIRZEPATIDE 2.5 MG/.5ML
1 INJECTION, SOLUTION SUBCUTANEOUS WEEKLY
Qty: 4 ML | Refills: 5 | Status: SHIPPED | OUTPATIENT
Start: 2024-12-12

## 2024-12-14 DIAGNOSIS — Z79.4 TYPE 2 DIABETES MELLITUS WITH DIABETIC NEUROPATHY, WITH LONG-TERM CURRENT USE OF INSULIN (HCC): ICD-10-CM

## 2024-12-14 DIAGNOSIS — L85.3 XEROSIS OF SKIN: ICD-10-CM

## 2024-12-14 DIAGNOSIS — E11.40 TYPE 2 DIABETES MELLITUS WITH DIABETIC NEUROPATHY, WITH LONG-TERM CURRENT USE OF INSULIN (HCC): ICD-10-CM

## 2024-12-16 RX ORDER — AMMONIUM LACTATE 12 G/100G
CREAM TOPICAL
Qty: 385 G | Refills: 5 | Status: SHIPPED | OUTPATIENT
Start: 2024-12-16

## 2024-12-17 DIAGNOSIS — J44.9 CHRONIC OBSTRUCTIVE PULMONARY DISEASE, UNSPECIFIED COPD TYPE (HCC): ICD-10-CM

## 2024-12-17 RX ORDER — ALBUTEROL SULFATE 0.83 MG/ML
SOLUTION RESPIRATORY (INHALATION)
Qty: 720 ML | Refills: 2 | Status: SHIPPED | OUTPATIENT
Start: 2024-12-17

## 2024-12-18 DIAGNOSIS — G62.9 NEUROPATHY: ICD-10-CM

## 2024-12-19 RX ORDER — FOLIC ACID 1 MG/1
1000 TABLET ORAL DAILY
Qty: 90 TABLET | Refills: 1 | Status: SHIPPED | OUTPATIENT
Start: 2024-12-19

## 2024-12-27 DIAGNOSIS — I25.10 CORONARY ARTERY DISEASE INVOLVING NATIVE CORONARY ARTERY OF NATIVE HEART WITHOUT ANGINA PECTORIS: ICD-10-CM

## 2024-12-27 RX ORDER — NITROGLYCERIN 0.4 MG/1
0.4 TABLET SUBLINGUAL
Qty: 25 TABLET | Refills: 0 | Status: SHIPPED | OUTPATIENT
Start: 2024-12-27

## 2024-12-30 LAB — COLOGUARD RESULT REPORTABLE: POSITIVE

## 2025-01-02 ENCOUNTER — OFFICE VISIT (OUTPATIENT)
Dept: BARIATRICS | Facility: CLINIC | Age: 65
End: 2025-01-02
Payer: COMMERCIAL

## 2025-01-02 VITALS
BODY MASS INDEX: 37.22 KG/M2 | HEIGHT: 74 IN | OXYGEN SATURATION: 98 % | DIASTOLIC BLOOD PRESSURE: 80 MMHG | WEIGHT: 290 LBS | SYSTOLIC BLOOD PRESSURE: 116 MMHG | HEART RATE: 82 BPM | TEMPERATURE: 96.8 F

## 2025-01-02 DIAGNOSIS — Z98.84 BARIATRIC SURGERY STATUS: Primary | ICD-10-CM

## 2025-01-02 DIAGNOSIS — Z01.818 PREOP TESTING: ICD-10-CM

## 2025-01-02 DIAGNOSIS — E66.01 OBESITY, CLASS III, BMI 40-49.9 (MORBID OBESITY) (HCC): ICD-10-CM

## 2025-01-02 PROCEDURE — 99213 OFFICE O/P EST LOW 20 MIN: CPT | Performed by: SURGERY

## 2025-01-02 NOTE — PROGRESS NOTES
POST OP UP VISIT - BARIATRIC SURGERY  Aristeo Hall 64 y.o. male MRN: [id number]  Unit/Bed#:  Encounter: [id number]      HPI:  Aristeo Hall is a 64 y.o. male status post PEH repair with LINX performed by Dr. Brad Mauro on 7/23/24 returning to office for second post op visit since surgery.    Tolerating soft diet. Reports eating frequent small meals.  Denies nausea and vomiting.  Taking multivitamins. Stopped PPI. Denies reflux.    Hx sleeve 2018. No prior lap prem.    Review of Systems   Constitutional:  Negative for chills and fever.   HENT:  Negative for ear pain and sore throat.    Eyes:  Negative for pain and visual disturbance.   Respiratory:  Negative for cough and shortness of breath.    Cardiovascular:  Negative for chest pain and palpitations.   Gastrointestinal:  Negative for abdominal pain and vomiting.   Genitourinary:  Negative for dysuria and hematuria.   Musculoskeletal:  Negative for arthralgias and back pain.   Skin:  Negative for color change and rash.   Neurological:  Negative for seizures and syncope.   All other systems reviewed and are negative.      Historical Information   Past Medical History:   Diagnosis Date    Acute on chronic diastolic congestive heart failure (HCC)     Altered gait     Alzheimer disease (HCC)     per patients,,early onset     Angina pectoris (HCC)     Anxiety     Arthritis     Brain aneurysm     coils placed    Cardiac disease     Chest pain 01/13/2016    Chronic kidney disease     Chronic pain     back/ right groin and rle- has morphine pump    Constipation     COPD (chronic obstructive pulmonary disease) (HCC)     Coronary artery disease     Decubital ulcer     sacral decub-occured 5/2019-sees wound care/debide in OR today 6/6/2019    Dependent on walker for ambulation     w/c for long distance    Depression     Diabetes mellitus (HCC)     insulin dependent -- pt states no longer dependent on insulin    Difficulty walking     Dizziness     occ     Dysphagia     Enlarged prostate     Esophageal stricture In file    Esophageal varices ([facility])     Fall     Fall at home 05/03/2019    GERD (gastroesophageal reflux disease)     Heart failure ([facility])     Hiatal hernia     Hx of gastric bypass 11/19/2018    states Nov 2019    Hypercholesterolemia     Hypertension     Ingrown toenail     MI (myocardial infarction) ([facility])     2017- stents x2    Migraine     Morbid obesity ([facility])     gastric bypass sleeve 11/2018-wt loss 125 lb    Myocardial infarction ([facility]) 2003    Neuropathy     Obesity 2003    Oxygen dependent     Q HS  2LPM with CPAP and prn during day 2-3 LPM  ( pt states no longer needs oxygen )    Pressure injury of skin     Pressure injury of skin of sacral region 06/03/2019    Added automatically from request for surgery [id number]    Renal disorder     Shortness of breath     Skin abnormality     sacral wound - covered with pad    Sleep apnea     couldnt tolerate CPAP    Stented coronary artery     Stroke ([facility])     vision loss b/l  2005, residual R leg weakness    Type 2 diabetes mellitus with diabetic neuropathy, with long-term current use of insulin ([facility]) 10/18/2019    Type 2 diabetes mellitus with renal complication ([facility])     insulin dependent    Type 2 diabetes mellitus, with long-term current use of insulin ([facility]) 10/11/2017    Transitioned From: Diabetes mellitus type 2, uncontrolled    Urinary frequency     Use of cane as ambulatory aid     Wears dentures     Wears glasses     Wears glasses     Wheelchair dependent     states uses walker & cane     Past Surgical History:   Procedure Laterality Date    BACK SURGERY      BRAIN SURGERY      CARDIAC CATHETERIZATION      with stents    CARDIAC CATHETERIZATION N/A 8/18/2023    Procedure: Cardiac Coronary Angiogram;  Surgeon: Horacio Weiner MD;  [facility]: [facility];  Service: Cardiology    CEREBRAL ANEURYSM REPAIR      with coils    COLONOSCOPY      ESOPHAGOGASTRODUODENOSCOPY N/A 7/1/2016    Procedure:  ESOPHAGOGASTRODUODENOSCOPY (EGD);  Surgeon: Reno Christiansen MD;  Location: [facility];  Service:     GASTRIC BYPASS  11/19/2018    HERNIA REPAIR      HIATAL HERNIA REPAIR      INFUSION PUMP IMPLANTATION Left     morphine    INTRATHECAL PUMP IMPLANTATION Left 7/9/2020    Procedure: REVISION INTRATHECAL PAIN PUMP POCKET, LEFT ABDOMEN;  Surgeon: Homero Cho MD;  Location: [facility];  Service: Neurosurgery    KNEE ARTHROSCOPY Right     KNEE ARTHROSCOPY Right     PERONEAL NERVE DECOMPRESSION Right     SC DEBRIDEMENT OPEN WOUND FIRST 20 SQ CM/< N/A 6/6/2019    Procedure: EXCISIONAL DEBRIDEMENT OF SACRAL DECUBITUS ULCER;  Surgeon: Siddhartha Omer MD;  Location: [facility];  Service: General    SC ESOPHAGOGASTRODUODENOSCOPY TRANSORAL DIAGNOSTIC N/A 2/27/2017    Procedure: ESOPHAGOGASTRODUODENOSCOPY (EGD);  Surgeon: Reno Christiansen MD;  [facility]: [facility];  Service: Gastroenterology    SC ESOPHAGOGASTRODUODENOSCOPY TRANSORAL DIAGNOSTIC N/A 8/23/2018    Procedure: ESOPHAGOGASTRODUODENOSCOPY (EGD) with biopsy;  Surgeon: Reno Christiansen MD;  [facility]: [facility];  Service: Gastroenterology    SC IMPLTJ/RPLCMT ITHCL/EDRL DRUG NFS PRGRBL PUMP Left 10/13/2020    Procedure: EXPLORATION OF INTRATHECAL PAIN PUMP SYSTEM INTEGRITY FOR POSSIBLE REPLACEMENT OF CATHETER AND PUMP.;  Surgeon: Homero Cho MD;  Location: [facility];  Service: Neurosurgery    SC IMPLTJ/RPLCMT ITHCL/EDRL DRUG NFS SUBQ RSVR N/A 1/19/2017    Procedure: REMOVAL / EXCHANGE INTRATHECAL PAIN PUMP;  Surgeon: Que Leonard MD;  [facility];  Service: Orthopedics    SC IMPLTJ/RPLCMT ITHCL/EDRL DRUG NFS SUBQ RSVR N/A 5/16/2016    Procedure: REPLACEMENT AND PROGRAM PUMP ;  Surgeon: Que Leonard MD;  [facility];  Service: Orthopedics    SC LAPS RPR PARAESPHGL HRNA INCL FUNDPLSTY W/MESH N/A 7/23/2024    Procedure: REPAIR HERNIA PARAESOPHAGEAL  W ROBOTICS & MAGNETIC SPINCTER AUGMENTATION DEVISE;  Surgeon: Mansi Mauro MD;  [facility];  Service:  Bariatrics    CA PRQ IMPLTJ NSTIM ELECTRODE ARRAY EPIDURAL Right 3/17/2021    Procedure: INSERTION THORACIC DORSAL COLUMN SPINAL CORD STIMULATOR PERCUTANEOUS W IMPLANTABLE PULSE GENERATOR, RIGHT;  Surgeon: Homero Cho MD;  [facility];  Service: Neurosurgery     Social History   Social History     Substance and Sexual Activity   Alcohol Use Not Currently     Social History     Substance and Sexual Activity   Drug Use Not Currently    Types: Marijuana    Comment: Medical card is for CBD cream . states no THC use     Social History     Tobacco Use   Smoking Status Never   Smokeless Tobacco Never     Family History: non-contributory    Meds/Allergies   all medications and allergies reviewed  No Known Allergies    Objective       Current Vitals:          Invasive Devices       Line  Duration             Pump Device Pain pump Left Abdomen -- days                    Physical Exam  Constitutional:       Appearance: Normal appearance. He is obese.   HENT:      Head: Normocephalic and atraumatic.      Nose: Nose normal.      Mouth/Throat:      Mouth: Mucous membranes are moist.   Eyes:      Extraocular Movements: Extraocular movements intact.      Pupils: Pupils are equal, round, and reactive to light.   Cardiovascular:      Rate and Rhythm: Normal rate and regular rhythm.      Pulses: Normal pulses.      Heart sounds: Normal heart sounds.   Pulmonary:      Effort: Pulmonary effort is normal.      Breath sounds: Normal breath sounds.   Abdominal:      Palpations: Abdomen is soft.      Comments: Incisions healing well with no signs of infection or drainage.  Pain pump visible in lower quadrant   Musculoskeletal:      Cervical back: Normal range of motion.   Skin:     General: Skin is warm.   Neurological:      General: No focal deficit present.      Mental Status: He is alert and oriented to person, place, and time.   Psychiatric:         Mood and Affect: Mood normal.         Behavior: Behavior normal.        Assessment/PLAN:    64 y.o. male status post PEH repair with LINX performed by Dr. Brad Mauro on 7/23/24 returning to office for 3 month post op visit since surgery, doing well post op. No major issues, no reflux.    We discussed weight loss options now that his reflux has been addressed. He is on Mounjaro (started 2.5 months ago), prescribed by PCP. We discussed option for weight loss which included ANGEL and bypass. Patient is not interested in Bypass. He is interested in ANGEL. We discussed the procedure in detail and he would like to proceed with the level 2 revision process.    Increase physical activity slowly as tolerated and instructed.  No need to restart PPI    Follow up in yearly routine f/u  Revision level 2 for conversion sleeve to ANGEL        Herman Chaparro MD  Bariatric Surgery   1/1/2025  7:25 PM      .

## 2025-01-02 NOTE — PROGRESS NOTES
Date of surgery: 7/23/2024  Procedure: PEHR  Performing surgeon: Dr. Brad Mauro    Initial Weight - 333.5 lbs.  Current Weight - 290.0 lbs.  James Weight - 290.0 lbs.  Total Body Weight Loss (EWL) - 43.4 lbs.  EWL% - 31%  TWB% - 13%

## 2025-01-04 ENCOUNTER — RESULTS FOLLOW-UP (OUTPATIENT)
Dept: FAMILY MEDICINE CLINIC | Facility: HOSPITAL | Age: 65
End: 2025-01-04

## 2025-01-04 DIAGNOSIS — R19.5 POSITIVE COLORECTAL CANCER SCREENING USING COLOGUARD TEST: Primary | ICD-10-CM

## 2025-01-07 ENCOUNTER — OFFICE VISIT (OUTPATIENT)
Dept: ENDOCRINOLOGY | Facility: CLINIC | Age: 65
End: 2025-01-07
Payer: COMMERCIAL

## 2025-01-07 VITALS
WEIGHT: 291 LBS | OXYGEN SATURATION: 97 % | HEIGHT: 73 IN | SYSTOLIC BLOOD PRESSURE: 120 MMHG | DIASTOLIC BLOOD PRESSURE: 80 MMHG | BODY MASS INDEX: 38.57 KG/M2 | HEART RATE: 72 BPM

## 2025-01-07 DIAGNOSIS — Z98.84 BARIATRIC SURGERY STATUS: ICD-10-CM

## 2025-01-07 DIAGNOSIS — E66.01 CLASS 2 SEVERE OBESITY DUE TO EXCESS CALORIES WITH SERIOUS COMORBIDITY AND BODY MASS INDEX (BMI) OF 38.0 TO 38.9 IN ADULT (HCC): ICD-10-CM

## 2025-01-07 DIAGNOSIS — E21.3 HYPERPARATHYROIDISM (HCC): ICD-10-CM

## 2025-01-07 DIAGNOSIS — E11.3293 TYPE 2 DIABETES MELLITUS WITH BOTH EYES AFFECTED BY MILD NONPROLIFERATIVE RETINOPATHY WITHOUT MACULAR EDEMA, WITHOUT LONG-TERM CURRENT USE OF INSULIN (HCC): ICD-10-CM

## 2025-01-07 DIAGNOSIS — E66.812 CLASS 2 SEVERE OBESITY DUE TO EXCESS CALORIES WITH SERIOUS COMORBIDITY AND BODY MASS INDEX (BMI) OF 38.0 TO 38.9 IN ADULT (HCC): ICD-10-CM

## 2025-01-07 DIAGNOSIS — E78.00 PURE HYPERCHOLESTEROLEMIA: ICD-10-CM

## 2025-01-07 DIAGNOSIS — I50.32 CHRONIC DIASTOLIC CONGESTIVE HEART FAILURE (HCC): ICD-10-CM

## 2025-01-07 DIAGNOSIS — E55.9 VITAMIN D DEFICIENCY: ICD-10-CM

## 2025-01-07 DIAGNOSIS — E11.65 TYPE 2 DIABETES MELLITUS WITH HYPERGLYCEMIA, WITHOUT LONG-TERM CURRENT USE OF INSULIN (HCC): Primary | ICD-10-CM

## 2025-01-07 PROCEDURE — 99214 OFFICE O/P EST MOD 30 MIN: CPT | Performed by: INTERNAL MEDICINE

## 2025-01-07 PROCEDURE — 95251 CONT GLUC MNTR ANALYSIS I&R: CPT | Performed by: INTERNAL MEDICINE

## 2025-01-07 RX ORDER — TIRZEPATIDE 7.5 MG/.5ML
INJECTION, SOLUTION SUBCUTANEOUS
Qty: 2 ML | Refills: 6 | Status: SHIPPED | OUTPATIENT
Start: 2025-01-07

## 2025-01-07 NOTE — ASSESSMENT & PLAN NOTE
Currently on Drisdol-he will discuss with his bariatric team if he is going to continue Drisdol long-term or switch to over-the-counter after he finishes his course

## 2025-01-07 NOTE — PROGRESS NOTES
Aristeo Hall 64 y.o. male MRN: [id number]    Encounter: [id number]      Assessment & Plan     Problem List Items Addressed This Visit          Cardiovascular and Mediastinum    Chronic diastolic congestive heart failure (HCC)    Wt Readings from Last 3 Encounters:   01/07/25 132 kg (291 lb)   01/02/25 132 kg (290 lb)   11/14/24 132 kg (291 lb 3.2 oz)           Being managed by cardiology         Relevant Medications    Tirzepatide (Mounjaro) 7.5 MG/0.5ML SOAJ    Empagliflozin (JARDIANCE) 10 MG TABS tablet       Endocrine    Hyperparathyroidism (HCC)    Due to vitamin D deficiency-reassess once vitamin D has normalized         Type 2 diabetes mellitus with both eyes affected by mild nonproliferative retinopathy without macular edema, without long-term current use of insulin ([facility])      Lab Results   Component Value Date    HGBA1C 6.3 (H) 11/14/2024   Continue follow-up with ophthalmologist         Relevant Medications    Tirzepatide (Mounjaro) 7.5 MG/0.5ML SOAJ    Empagliflozin (JARDIANCE) 10 MG TABS tablet    Type 2 diabetes mellitus with hyperglycemia, without long-term current use of insulin ([facility]) - Primary      Lab Results   Component Value Date    HGBA1C 6.3 (H) 11/14/2024   Increase Mounjaro to 7.5 mg weekly-continue Jardiance.  Continue to focus on dietary modifications and weight loss         Relevant Medications    Tirzepatide (Mounjaro) 7.5 MG/0.5ML SOAJ    Empagliflozin (JARDIANCE) 10 MG TABS tablet    Other Relevant Orders    Hemoglobin A1C    Albumin / creatinine urine ratio    Comprehensive metabolic panel    TSH, 3rd generation       Surgery/Wound/Pain    Bariatric surgery status       Other    Class 2 severe obesity due to excess calories with serious comorbidity and body mass index (BMI) of 38.0 to 38.9 in adult ([facility])    Hyperlipidemia    Relevant Orders    Comprehensive metabolic panel    Lipid Panel with Direct LDL reflex    Vitamin D deficiency    Currently on Drisdol-he will discuss  with his bariatric team if he is going to continue Drisdol long-term or switch to over-the-counter after he finishes his course           CC: Diabetes    History of Present Illness     HPI:  64-year-old male with type 2 diabetes, history of bariatric surgery, congestive heart failure, vitamin D deficiency seen in follow-up    Current regimen:   Jardiance 10 mg daily  Maria Luisa Hall   Device used dexcom  Home use       Indication     Type 2 Diabetes    More than 72 hours of data was reviewed. Report to be scanned to chart.     Date Range:  dec 25th -jan 7th 2025     Analysis of data:   Average Glucose: 149 mg/dl  SD : 26 mg/dl  Time in Target Range: 88%  Time Above Range: 12%  Time Below Range: 0%    Interpretation of data: Sugars overall well-controlled with occasional occasional post meal hyperglycemia        No GI SE  , has chronic constipation due to narcotics   No polyuria , polydipsia  ,  c/o blurry vision     + numbness and tingling in feet       Review of Systems    Historical Information   Past Medical History:   Diagnosis Date    Acute on chronic diastolic congestive heart failure (HCC)     Altered gait     Alzheimer disease (HCC)     per patients,,early onset     Angina pectoris (HCC)     Anxiety     Arthritis     Brain aneurysm     coils placed    Cardiac disease     Chest pain 01/13/2016    Chronic kidney disease     Chronic pain     back/ right groin and rle- has morphine pump    Constipation     COPD (chronic obstructive pulmonary disease) (HCC)     Coronary artery disease     Decubital ulcer     sacral decub-occured 5/2019-sees wound care/debide in OR today 6/6/2019    Dependent on walker for ambulation     w/c for long distance    Depression     Diabetes mellitus (HCC)     insulin dependent -- pt states no longer dependent on insulin    Difficulty walking     Dizziness     occ    Dysphagia     Enlarged prostate     Esophageal stricture In file    Esophageal varices (HCC)      Fall     Fall at home 05/03/2019    GERD (gastroesophageal reflux disease)     Heart failure ([facility])     Hiatal hernia     Hx of gastric bypass 11/19/2018    states Nov 2019    Hypercholesterolemia     Hypertension     Ingrown toenail     MI (myocardial infarction) ([facility])     2017- stents x2    Migraine     Morbid obesity ([facility])     gastric bypass sleeve 11/2018-wt loss 125 lb    Myocardial infarction ([facility]) 2003    Neuropathy     Obesity 2003    Oxygen dependent     Q HS  2LPM with CPAP and prn during day 2-3 LPM  ( pt states no longer needs oxygen )    Pressure injury of skin     Pressure injury of skin of sacral region 06/03/2019    Added automatically from request for surgery [id number]    Renal disorder     Shortness of breath     Skin abnormality     sacral wound - covered with pad    Sleep apnea     couldnt tolerate CPAP    Stented coronary artery     Stroke ([facility])     vision loss b/l  2005, residual R leg weakness    Type 2 diabetes mellitus with diabetic neuropathy, with long-term current use of insulin ([facility]) 10/18/2019    Type 2 diabetes mellitus with renal complication ([facility])     insulin dependent    Type 2 diabetes mellitus, with long-term current use of insulin ([facility]) 10/11/2017    Transitioned From: Diabetes mellitus type 2, uncontrolled    Urinary frequency     Use of cane as ambulatory aid     Wears dentures     Wears glasses     Wears glasses     Wheelchair dependent     states uses walker & cane     Past Surgical History:   Procedure Laterality Date    BACK SURGERY      BRAIN SURGERY      CARDIAC CATHETERIZATION      with stents    CARDIAC CATHETERIZATION N/A 8/18/2023    Procedure: Cardiac Coronary Angiogram;  Surgeon: Horacio Weiner MD;  Location: [facility];  Service: Cardiology    CEREBRAL ANEURYSM REPAIR      with coils    COLONOSCOPY      ESOPHAGOGASTRODUODENOSCOPY N/A 7/1/2016    Procedure: ESOPHAGOGASTRODUODENOSCOPY (EGD);  Surgeon: Reno Christiansen MD;  Location: [facility];  Service:     GASTRIC  BYPASS  11/19/2018    HERNIA REPAIR      HIATAL HERNIA REPAIR      INFUSION PUMP IMPLANTATION Left     morphine    INTRATHECAL PUMP IMPLANTATION Left 7/9/2020    Procedure: REVISION INTRATHECAL PAIN PUMP POCKET, LEFT ABDOMEN;  Surgeon: Homero Cho MD;  Location: [facility];  Service: Neurosurgery    KNEE ARTHROSCOPY Right     KNEE ARTHROSCOPY Right     PERONEAL NERVE DECOMPRESSION Right     ME DEBRIDEMENT OPEN WOUND FIRST 20 SQ CM/< N/A 6/6/2019    Procedure: EXCISIONAL DEBRIDEMENT OF SACRAL DECUBITUS ULCER;  Surgeon: Siddhartha Omer MD;  Location: [facility];  Service: General    ME ESOPHAGOGASTRODUODENOSCOPY TRANSORAL DIAGNOSTIC N/A 2/27/2017    Procedure: ESOPHAGOGASTRODUODENOSCOPY (EGD);  Surgeon: Reno Christiansen MD;  Location: [facility];  Service: Gastroenterology    ME ESOPHAGOGASTRODUODENOSCOPY TRANSORAL DIAGNOSTIC N/A 8/23/2018    Procedure: ESOPHAGOGASTRODUODENOSCOPY (EGD) with biopsy;  Surgeon: Reno Christiansen MD;  Location: [facility];  Service: Gastroenterology    ME IMPLTJ/RPLCMT ITHCL/EDRL DRUG NFS PRGRBL PUMP Left 10/13/2020    Procedure: EXPLORATION OF INTRATHECAL PAIN PUMP SYSTEM INTEGRITY FOR POSSIBLE REPLACEMENT OF CATHETER AND PUMP.;  Surgeon: Homero Cho MD;  Location: [facility];  Service: Neurosurgery    ME IMPLTJ/RPLCMT ITHCL/EDRL DRUG NFS SUBQ RSVR N/A 1/19/2017    Procedure: REMOVAL / EXCHANGE INTRATHECAL PAIN PUMP;  Surgeon: Que Leonard MD;  [facility];  Service: Orthopedics    ME IMPLTJ/RPLCMT ITHCL/EDRL DRUG NFS SUBQ RSVR N/A 5/16/2016    Procedure: REPLACEMENT AND PROGRAM PUMP ;  Surgeon: Que Leonard MD;  [facility];  Service: Orthopedics    ME LAPS RPR PARAESPHGL HRNA INCL FUNDPLSTY W/MESH N/A 7/23/2024    Procedure: REPAIR HERNIA PARAESOPHAGEAL  W ROBOTICS & MAGNETIC SPINCTER AUGMENTATION DEVISE;  Surgeon: Mansi Mauro MD;  [facility];  Service: Bariatrics    ME PRQ IMPLTJ NSTIM ELECTRODE ARRAY EPIDURAL Right 3/17/2021    Procedure: INSERTION THORACIC  DORSAL COLUMN SPINAL CORD STIMULATOR PERCUTANEOUS W IMPLANTABLE PULSE GENERATOR, RIGHT;  Surgeon: Homero Cho MD;  Location: [facility];  Service: Neurosurgery     Social History   Social History     Substance and Sexual Activity   Alcohol Use Not Currently     Social History     Substance and Sexual Activity   Drug Use Not Currently    Types: Marijuana    Comment: Medical card is for CBD cream . states no THC use     Social History     Tobacco Use   Smoking Status Never   Smokeless Tobacco Never     Family History:   Family History   Problem Relation Age of Onset    Diabetes unspecified Mother     Diabetes Mother     Heart attack Father     Heart disease Father     Hypertension Father     Stroke Father     Diabetes unspecified Brother     Depression Brother     Mental illness Brother     COPD Brother     Drug abuse Brother     Bipolar disorder Brother     Diabetes unspecified Brother     Diabetes unspecified Maternal Grandmother     Diabetes unspecified Paternal Grandmother     Diabetes unspecified Paternal Uncle     ADD / ADHD Cousin     Colon cancer Neg Hx     Colon polyps Neg Hx        Meds/Allergies   Current Outpatient Medications   Medication Sig Dispense Refill    albuterol (2.5 mg/3 mL) 0.083 % nebulizer solution USE 1 VIAL IN NEBULIZER EVERY 6 HOURS AS NEEDED FOR WHEEZING OR SHORTNESS OF BREATH 720 mL 2    amLODIPine (NORVASC) 5 mg tablet Take 1 tablet (5 mg total) by mouth daily 90 tablet 0    ammonium lactate (LAC-HYDRIN) 12 % cream APPLY  CREAM TOPICALLY TO AFFECTED AREA TWICE DAILY 385 g 5    aspirin 81 mg chewable tablet Chew 1 tablet (81 mg total) daily 90 tablet 1    atorvastatin (LIPITOR) 80 mg tablet Take 1 tablet (80 mg total) by mouth daily after dinner 90 tablet 1    baclofen 10 mg tablet Take 10 mg by mouth 3 (three) times a day      busPIRone (BUSPAR) 5 mg tablet Take 1 tablet (5 mg total) by mouth 2 (two) times a day 180 tablet 1    CALCIUM PO Take 1 tablet by mouth daily      carvedilol  (COREG) 12.5 mg tablet Take 1 tablet by mouth twice daily with food 180 tablet 1    clopidogrel (PLAVIX) 75 mg tablet Take 1 tablet (75 mg total) by mouth daily 90 tablet 1    Continuous Blood Gluc Transmit (Dexcom G6 Transmitter) MISC 1 TRANSMITTED EVERY 3 MONTHS FOR CONTINUOUS GLUCOSE MONITORING 1 each 3    Continuous Glucose Sensor (Dexcom G6 Sensor) MISC 3 PACK SENSOR FOR CONTINUOUS GLUCOSE MONITORING 9 each 1    docusate sodium (COLACE) 100 mg capsule Take 1 capsule (100 mg total) by mouth 2 (two) times a day 180 capsule 3    DULoxetine (Cymbalta) 30 mg delayed release capsule Take 1 capsule (30 mg total) by mouth daily 30 capsule 3    Empagliflozin (JARDIANCE) 10 MG TABS tablet Take 1 tablet (10 mg total) by mouth every morning 90 tablet 1    ergocalciferol (VITAMIN D2) 50,000 units Take 1 capsule (50,000 Units total) by mouth 2 (two) times a week 24 capsule 0    Fluticasone-Salmeterol (Advair) 500-50 mcg/dose inhaler INHALE 1 PUFF TWICE DAILY RINSE . MOUTH AFTER  blister 1    folic acid (FOLVITE) 1 mg tablet Take 1 tablet by mouth once daily 90 tablet 1    furosemide (LASIX) 40 mg tablet Take once in AM daily. Take once daily in PM PRN weight gain, edema. 180 tablet 3    gabapentin (NEURONTIN) 300 mg capsule Take 1 capsule (300 mg total) by mouth daily as needed (headache) 30 capsule 5    gabapentin (NEURONTIN) 800 mg tablet Take 1 tablet (800 mg total) by mouth 4 (four) times a day 360 tablet 1    hydrocortisone 1 % lotion Apply topically if needed for rash 59 mL 1    lidocaine (LIDODERM) 5 % Apply 1 patch topically daily back      losartan (COZAAR) 50 mg tablet Take 1 tablet (50 mg total) by mouth daily 100 tablet 1    methocarbamol (ROBAXIN) 750 mg tablet TAKE 1 TABLET (750 MG TOTAL) BY MOUTH EVERY 6 (SIX) HOURS AS NEEDED FOR MUSCLE SPASMS 120 tablet 0    naloxegol oxalate (MOVANTIK) 25 MG tablet Take 1 tablet (25 mg total) by mouth daily in the early morning 30 tablet 5    nitroglycerin (NITROSTAT)  "0.4 mg SL tablet DISSOLVE ONE TABLET UNDER THE TONGUE EVERY 5 MINUTES AS NEEDED FOR CHEST PAIN.  DO NOT EXCEED A TOTAL OF 3 DOSES IN 15 MINUTES 25 tablet 0    nystatin (MYCOSTATIN) cream Apply 2 g (1 Application total) topically 2 (two) times a day 15 g 0    potassium chloride (Klor-Con M20) 20 mEq tablet Take 1 tablet (20 mEq total) by mouth daily 90 tablet 1    ranolazine (RANEXA) 1000 MG SR tablet TAKE 1 TABLET (1,000 MG TOTAL) BY MOUTH EVERY 12 HOURS 60 tablet 3    tamsulosin (FLOMAX) 0.4 mg TAKE 1 CAPSULE BY MOUTH EVERY DAY WITH DINNER 90 capsule 1    Tirzepatide (Mounjaro) 7.5 MG/0.5ML SOAJ Once a week 2 mL 6    topiramate (Topamax) 25 mg tablet Take 1 tablet (25 mg total) by mouth 2 (two) times a day Take 1 tablet once a day for 2 weeks. Then take 1 tablet in the morning and one tablet in the evening. 60 tablet 5    traZODone (DESYREL) 50 mg tablet Take 1 tablet by mouth twice daily 180 tablet 1    vitamin B-12 (VITAMIN B-12) 1,000 mcg tablet Take 1 tablet (1,000 mcg total) by mouth daily 90 tablet 3     No current facility-administered medications for this visit.     No Known Allergies    Objective   Vitals: Blood pressure 120/80, pulse 72, height 6' 1\" (1.854 m), weight 132 kg (291 lb), SpO2 97%.    Physical Exam  Vitals reviewed.   Constitutional:       General: He is not in acute distress.     Appearance: Normal appearance. He is obese. He is not ill-appearing, toxic-appearing or diaphoretic.   HENT:      Head: Normocephalic and atraumatic.   Eyes:      General: No scleral icterus.     Extraocular Movements: Extraocular movements intact.   Cardiovascular:      Rate and Rhythm: Normal rate and regular rhythm.      Heart sounds: Normal heart sounds. No murmur heard.  Pulmonary:      Effort: Pulmonary effort is normal. No respiratory distress.      Breath sounds: Normal breath sounds. No wheezing or rales.   Musculoskeletal:      Cervical back: Neck supple.      Right lower leg: No edema.      Left lower " leg: No edema.   Lymphadenopathy:      Cervical: No cervical adenopathy.   Skin:     General: Skin is warm and dry.   Neurological:      General: No focal deficit present.      Mental Status: He is alert and oriented to person, place, and time.      Gait: Gait abnormal.   Psychiatric:         Mood and Affect: Mood normal.         Behavior: Behavior normal.         Thought Content: Thought content normal.         Judgment: Judgment normal.         The history was obtained from the review of the chart, patient.    Lab Results:   Lab Results   Component Value Date/Time    Hemoglobin A1C 6.3 (H) 11/14/2024 10:19 AM    Hemoglobin A1C 6.3 10/01/2024 09:40 AM    Hemoglobin A1C 6.6 (H) 04/05/2024 11:47 AM    Hemoglobin A1C 6.2 (H) 03/28/2024 10:34 AM    WBC 12.24 (H) 11/14/2024 10:19 AM    WBC 13.17 (H) 07/24/2024 05:59 AM    WBC 12.44 (H) 04/05/2024 11:47 AM    Hemoglobin 15.9 11/14/2024 10:19 AM    Hemoglobin 13.3 07/24/2024 10:37 AM    Hemoglobin 12.8 07/24/2024 05:59 AM    Hematocrit 50.0 (H) 11/14/2024 10:19 AM    Hematocrit 40.5 07/24/2024 10:37 AM    Hematocrit 39.6 07/24/2024 05:59 AM    MCV 95 11/14/2024 10:19 AM    MCV 91 07/24/2024 05:59 AM    MCV 96 04/05/2024 11:47 AM    Platelets 277 11/14/2024 10:19 AM    Platelets 204 07/24/2024 05:59 AM    Platelets 274 04/05/2024 11:47 AM    BUN 11 11/14/2024 10:19 AM    BUN 13 07/24/2024 05:59 AM    BUN 11 04/05/2024 11:47 AM    Potassium 3.3 (L) 11/14/2024 10:19 AM    Potassium 3.8 07/24/2024 05:59 AM    Potassium 4.1 04/05/2024 11:47 AM    Chloride 99 11/14/2024 10:19 AM    Chloride 103 07/24/2024 05:59 AM    Chloride 101 04/05/2024 11:47 AM    CO2 31 11/14/2024 10:19 AM    CO2 26 07/24/2024 05:59 AM    CO2 29 04/05/2024 11:47 AM    Creatinine 1.03 11/14/2024 10:19 AM    Creatinine 1.06 07/24/2024 05:59 AM    Creatinine 1.16 04/05/2024 11:47 AM    AST 19 11/14/2024 10:19 AM    AST 17 04/05/2024 11:47 AM    ALT 21 11/14/2024 10:19 AM    ALT 24 04/05/2024 11:47 AM     "Total Protein 7.3 11/14/2024 10:19 AM    Total Protein 6.3 (L) 06/07/2024 10:54 AM    Total Protein 7.0 04/05/2024 11:47 AM    Albumin 3.5 11/14/2024 10:19 AM    Albumin 3.8 04/05/2024 11:47 AM    HDL, Direct 27 (L) 11/14/2024 10:19 AM    HDL, Direct 26 (L) 03/29/2024 05:25 AM    Triglycerides 273 (H) 11/14/2024 10:19 AM    Triglycerides 212 (H) 03/29/2024 05:25 AM           Imaging Studies: Results Review Statement: No pertinent imaging studies reviewed.    Portions of the record may have been created with voice recognition software. Occasional wrong word or \"sound a like\" substitutions may have occurred due to the inherent limitations of voice recognition software. Read the chart carefully and recognize, using context, where substitutions have occurred.    "

## 2025-01-07 NOTE — ASSESSMENT & PLAN NOTE
Wt Readings from Last 3 Encounters:   01/07/25 132 kg (291 lb)   01/02/25 132 kg (290 lb)   11/14/24 132 kg (291 lb 3.2 oz)           Being managed by cardiology

## 2025-01-07 NOTE — ASSESSMENT & PLAN NOTE
Lab Results   Component Value Date    HGBA1C 6.3 (H) 11/14/2024   Increase Mounjaro to 7.5 mg weekly-continue Jardiance.  Continue to focus on dietary modifications and weight loss

## 2025-01-07 NOTE — ASSESSMENT & PLAN NOTE
Lab Results   Component Value Date    HGBA1C 6.3 (H) 11/14/2024   Continue follow-up with ophthalmologist

## 2025-01-19 DIAGNOSIS — I21.3 STEMI (ST ELEVATION MYOCARDIAL INFARCTION) (HCC): ICD-10-CM

## 2025-01-20 RX ORDER — CARVEDILOL 12.5 MG/1
12.5 TABLET ORAL 2 TIMES DAILY WITH MEALS
Qty: 60 TABLET | Refills: 0 | Status: SHIPPED | OUTPATIENT
Start: 2025-01-20

## 2025-01-20 NOTE — PROGRESS NOTES
Bariatric Nutrition Assessment Note    Preop  {Preop :19529}    Type of surgery  {NUT BARIATRIC SURGERY:39734}  Surgery Date: ***  *** {days/wks/mos/yrs:23896}  post-op  Surgeon: [facility]1975[facility]}     Nutrition Assessment   Aristeo ATWOOD Rafael  64 y.o.  male  There were no vitals taken for this visit.    {energy needs:96153}  Estimated calories for weight maintenance:  ***  ( sedentary  )   Estimated calories for weight loss *** ( 1-2# per wk wt loss - sedentary )  Estimated protein needs ***(1.0-1.5 gms/kg IBW )   Estimated fluid needs ***(30-35 ml/kg IBW )      Weight on Day of Weight Loss Surgery: ***  Weight in (lb) to have BMI = 25: ***  Pre-Op Excess Wt: ***  Post-Op Wt Loss: ***#/ ***% EBWL/ *** % TBWL  in *** {TIME FRAME:18732}    Review of History and Medications   Past Medical History:   Diagnosis Date    Acute on chronic diastolic congestive heart failure (HCC)     Altered gait     Alzheimer disease (HCC)     per patients,,early onset     Angina pectoris (HCC)     Anxiety     Arthritis     Brain aneurysm     coils placed    Cardiac disease     Chest pain 01/13/2016    Chronic kidney disease     Chronic pain     back/ right groin and rle- has morphine pump    Constipation     COPD (chronic obstructive pulmonary disease) (HCC)     Coronary artery disease     Decubital ulcer     sacral decub-occured 5/2019-sees wound care/debide in OR today 6/6/2019    Dependent on walker for ambulation     w/c for long distance    Depression     Diabetes mellitus (HCC)     insulin dependent -- pt states no longer dependent on insulin    Difficulty walking     Dizziness     occ    Dysphagia     Enlarged prostate     Esophageal stricture In file    Esophageal varices (HCC)     Fall     Fall at home 05/03/2019    GERD (gastroesophageal reflux disease)     Heart failure (HCC)     Hiatal hernia     Hx of gastric bypass 11/19/2018    states Nov 2019    Hypercholesterolemia     Hypertension     Ingrown toenail     MI  (myocardial infarction) ([facility]ly [facility])     2017- stents x2    Migraine     Morbid obesity ([facility])     gastric bypass sleeve 11/2018-wt loss 125 lb    Myocardial infarction ([facility]ly [facility]) 2003    Neuropathy     Obesity 2003    Oxygen dependent     Q HS  2LPM with CPAP and prn during day 2-3 LPM  ( pt states no longer needs oxygen )    Pressure injury of skin     Pressure injury of skin of sacral region 06/03/2019    Added automatically from request for surgery [id number]    Renal disorder     Shortness of breath     Skin abnormality     sacral wound - covered with pad    Sleep apnea     couldnt tolerate CPAP    Stented coronary artery     Stroke ([facility])     vision loss b/l  2005, residual R leg weakness    Type 2 diabetes mellitus with diabetic neuropathy, with long-term current use of insulin ([facility]) 10/18/2019    Type 2 diabetes mellitus with renal complication ([facility])     insulin dependent    Type 2 diabetes mellitus, with long-term current use of insulin ([facility]) 10/11/2017    Transitioned From: Diabetes mellitus type 2, uncontrolled    Urinary frequency     Use of cane as ambulatory aid     Wears dentures     Wears glasses     Wears glasses     Wheelchair dependent     states uses walker & cane     Past Surgical History:   Procedure Laterality Date    BACK SURGERY      BRAIN SURGERY      CARDIAC CATHETERIZATION      with stents    CARDIAC CATHETERIZATION N/A 8/18/2023    Procedure: Cardiac Coronary Angiogram;  Surgeon: Horacio Weiner MD;  Location: [facility];  Service: Cardiology    CEREBRAL ANEURYSM REPAIR      with coils    COLONOSCOPY      ESOPHAGOGASTRODUODENOSCOPY N/A 7/1/2016    Procedure: ESOPHAGOGASTRODUODENOSCOPY (EGD);  Surgeon: Reno Christiansen MD;  Location: [facility];  Service:     GASTRIC BYPASS  11/19/2018    HERNIA REPAIR      HIATAL HERNIA REPAIR      INFUSION PUMP IMPLANTATION Left     morphine    INTRATHECAL PUMP IMPLANTATION Left 7/9/2020    Procedure: REVISION INTRATHECAL PAIN PUMP POCKET, LEFT ABDOMEN;  Surgeon: Homero DENNISON  MD Marquis;  Location: [facility];  Service: Neurosurgery    KNEE ARTHROSCOPY Right     KNEE ARTHROSCOPY Right     PERONEAL NERVE DECOMPRESSION Right     MI DEBRIDEMENT OPEN WOUND FIRST 20 SQ CM/< N/A 6/6/2019    Procedure: EXCISIONAL DEBRIDEMENT OF SACRAL DECUBITUS ULCER;  Surgeon: Siddhartha Omer MD;  [facility];  Service: General    MI ESOPHAGOGASTRODUODENOSCOPY TRANSORAL DIAGNOSTIC N/A 2/27/2017    Procedure: ESOPHAGOGASTRODUODENOSCOPY (EGD);  Surgeon: Reno Christiansen MD;  [facility]: [facility];  Service: Gastroenterology    MI ESOPHAGOGASTRODUODENOSCOPY TRANSORAL DIAGNOSTIC N/A 8/23/2018    Procedure: ESOPHAGOGASTRODUODENOSCOPY (EGD) with biopsy;  Surgeon: Reno Christiansen MD;  [facility]: [facility];  Service: Gastroenterology    MI IMPLTJ/RPLCMT ITHCL/EDRL DRUG NFS PRGRBL PUMP Left 10/13/2020    Procedure: EXPLORATION OF INTRATHECAL PAIN PUMP SYSTEM INTEGRITY FOR POSSIBLE REPLACEMENT OF CATHETER AND PUMP.;  Surgeon: Homero Cho MD;  Location: [facility];  Service: Neurosurgery    MI IMPLTJ/RPLCMT ITHCL/EDRL DRUG NFS SUBQ RSVR N/A 1/19/2017    Procedure: REMOVAL / EXCHANGE INTRATHECAL PAIN PUMP;  Surgeon: Que Leonard MD;  [facility];  Service: Orthopedics    MI IMPLTJ/RPLCMT ITHCL/EDRL DRUG NFS SUBQ RSVR N/A 5/16/2016    Procedure: REPLACEMENT AND PROGRAM PUMP ;  Surgeon: Que Leonard MD;  [facility];  Service: Orthopedics    MI LAPS RPR PARAESPHGL HRNA INCL FUNDPLSTY W/MESH N/A 7/23/2024    Procedure: REPAIR HERNIA PARAESOPHAGEAL  W ROBOTICS & MAGNETIC SPINCTER AUGMENTATION DEVISE;  Surgeon: Mansi Mauro MD;  [facility];  Service: Bariatrics    MI PRQ IMPLTJ NSTIM ELECTRODE ARRAY EPIDURAL Right 3/17/2021    Procedure: INSERTION THORACIC DORSAL COLUMN SPINAL CORD STIMULATOR PERCUTANEOUS W IMPLANTABLE PULSE GENERATOR, RIGHT;  Surgeon: Homero Cho MD;  [facility];  Service: Neurosurgery     Social History     Socioeconomic History    Marital status: /Civil  Union     Spouse name: Reva    Number of children: 5    Years of education: Not on file    Highest education level: GED or equivalent   Occupational History    Not on file   Tobacco Use    Smoking status: Never    Smokeless tobacco: Never   Vaping Use    Vaping status: Never Used   Substance and Sexual Activity    Alcohol use: Not Currently    Drug use: Not Currently     Types: Marijuana     Comment: Medical card is for CBD cream . states no THC use    Sexual activity: Not Currently     Partners: Female   Other Topics Concern    Not on file   Social History Narrative    Not on file     Social Drivers of Health     Financial Resource Strain: High Risk (9/19/2023)    Overall Financial Resource Strain (CARDIA)     Difficulty of Paying Living Expenses: Very hard   Food Insecurity: Food Insecurity Present (11/14/2024)    Hunger Vital Sign     Worried About Running Out of Food in the Last Year: Often true     Ran Out of Food in the Last Year: Often true   Transportation Needs: No Transportation Needs (11/14/2024)    PRAPARE - Transportation     Lack of Transportation (Medical): No     Lack of Transportation (Non-Medical): No   Physical Activity: Inactive (9/6/2019)    Exercise Vital Sign     Days of Exercise per Week: 0 days     Minutes of Exercise per Session: 0 min   Stress: Stress Concern Present (9/6/2019)    [employer] [place] [employer] - Occupational Stress Questionnaire     Feeling of Stress : Very much   Social Connections: Moderately Isolated (9/6/2019)    Social Connection and Isolation Panel [NHANES]     Frequency of Communication with Friends and Family: Never     Frequency of Social Gatherings with Friends and Family: Twice a week     Attends Druze Services: 1 to 4 times per year     Active Member of Clubs or Organizations: No     Attends Club or Organization Meetings: Never     Marital Status:    Intimate Partner Violence: Not At Risk (9/6/2019)    Humiliation, Afraid, Rape, and Kick  questionnaire     Fear of Current or Ex-Partner: No     Emotionally Abused: No     Physically Abused: No     Sexually Abused: No   Housing Stability: Low Risk  (11/14/2024)    Housing Stability Vital Sign     Unable to Pay for Housing in the Last Year: No     Number of Times Moved in the Last Year: 1     Homeless in the Last Year: No       Current Outpatient Medications:     albuterol (2.5 mg/3 mL) 0.083 % nebulizer solution, USE 1 VIAL IN NEBULIZER EVERY 6 HOURS AS NEEDED FOR WHEEZING OR SHORTNESS OF BREATH, Disp: 720 mL, Rfl: 2    amLODIPine (NORVASC) 5 mg tablet, Take 1 tablet (5 mg total) by mouth daily, Disp: 90 tablet, Rfl: 0    ammonium lactate (LAC-HYDRIN) 12 % cream, APPLY  CREAM TOPICALLY TO AFFECTED AREA TWICE DAILY, Disp: 385 g, Rfl: 5    aspirin 81 mg chewable tablet, Chew 1 tablet (81 mg total) daily, Disp: 90 tablet, Rfl: 1    atorvastatin (LIPITOR) 80 mg tablet, Take 1 tablet (80 mg total) by mouth daily after dinner, Disp: 90 tablet, Rfl: 1    baclofen 10 mg tablet, Take 10 mg by mouth 3 (three) times a day, Disp: , Rfl:     busPIRone (BUSPAR) 5 mg tablet, Take 1 tablet (5 mg total) by mouth 2 (two) times a day, Disp: 180 tablet, Rfl: 1    CALCIUM PO, Take 1 tablet by mouth daily, Disp: , Rfl:     carvedilol (COREG) 12.5 mg tablet, Take 1 tablet by mouth twice daily with food, Disp: 60 tablet, Rfl: 0    clopidogrel (PLAVIX) 75 mg tablet, Take 1 tablet (75 mg total) by mouth daily, Disp: 90 tablet, Rfl: 1    Continuous Blood Gluc Transmit (Dexcom G6 Transmitter) MISC, 1 TRANSMITTED EVERY 3 MONTHS FOR CONTINUOUS GLUCOSE MONITORING, Disp: 1 each, Rfl: 3    Continuous Glucose Sensor (Dexcom G6 Sensor) MISC, 3 PACK SENSOR FOR CONTINUOUS GLUCOSE MONITORING, Disp: 9 each, Rfl: 1    docusate sodium (COLACE) 100 mg capsule, Take 1 capsule (100 mg total) by mouth 2 (two) times a day, Disp: 180 capsule, Rfl: 3    DULoxetine (Cymbalta) 30 mg delayed release capsule, Take 1 capsule (30 mg total) by mouth daily,  Disp: 30 capsule, Rfl: 3    Empagliflozin (JARDIANCE) 10 MG TABS tablet, Take 1 tablet (10 mg total) by mouth every morning, Disp: 90 tablet, Rfl: 1    ergocalciferol (VITAMIN D2) 50,000 units, Take 1 capsule (50,000 Units total) by mouth 2 (two) times a week, Disp: 24 capsule, Rfl: 0    Fluticasone-Salmeterol (Advair) 500-50 mcg/dose inhaler, INHALE 1 PUFF TWICE DAILY RINSE . MOUTH AFTER USE, Disp: 180 blister, Rfl: 1    folic acid (FOLVITE) 1 mg tablet, Take 1 tablet by mouth once daily, Disp: 90 tablet, Rfl: 1    furosemide (LASIX) 40 mg tablet, Take once in AM daily. Take once daily in PM PRN weight gain, edema., Disp: 180 tablet, Rfl: 3    gabapentin (NEURONTIN) 300 mg capsule, Take 1 capsule (300 mg total) by mouth daily as needed (headache), Disp: 30 capsule, Rfl: 5    gabapentin (NEURONTIN) 800 mg tablet, Take 1 tablet (800 mg total) by mouth 4 (four) times a day, Disp: 360 tablet, Rfl: 1    hydrocortisone 1 % lotion, Apply topically if needed for rash, Disp: 59 mL, Rfl: 1    lidocaine (LIDODERM) 5 %, Apply 1 patch topically daily back, Disp: , Rfl:     losartan (COZAAR) 50 mg tablet, Take 1 tablet (50 mg total) by mouth daily, Disp: 100 tablet, Rfl: 1    methocarbamol (ROBAXIN) 750 mg tablet, TAKE 1 TABLET (750 MG TOTAL) BY MOUTH EVERY 6 (SIX) HOURS AS NEEDED FOR MUSCLE SPASMS, Disp: 120 tablet, Rfl: 0    naloxegol oxalate (MOVANTIK) 25 MG tablet, Take 1 tablet (25 mg total) by mouth daily in the early morning, Disp: 30 tablet, Rfl: 5    nitroglycerin (NITROSTAT) 0.4 mg SL tablet, DISSOLVE ONE TABLET UNDER THE TONGUE EVERY 5 MINUTES AS NEEDED FOR CHEST PAIN.  DO NOT EXCEED A TOTAL OF 3 DOSES IN 15 MINUTES, Disp: 25 tablet, Rfl: 0    nystatin (MYCOSTATIN) cream, Apply 2 g (1 Application total) topically 2 (two) times a day, Disp: 15 g, Rfl: 0    potassium chloride (Klor-Con M20) 20 mEq tablet, Take 1 tablet (20 mEq total) by mouth daily, Disp: 90 tablet, Rfl: 1    ranolazine (RANEXA) 1000 MG SR tablet, TAKE  1 TABLET (1,000 MG TOTAL) BY MOUTH EVERY 12 HOURS, Disp: 60 tablet, Rfl: 3    tamsulosin (FLOMAX) 0.4 mg, TAKE 1 CAPSULE BY MOUTH EVERY DAY WITH DINNER, Disp: 90 capsule, Rfl: 1    Tirzepatide (Mounjaro) 7.5 MG/0.5ML SOAJ, Once a week, Disp: 2 mL, Rfl: 6    topiramate (Topamax) 25 mg tablet, Take 1 tablet (25 mg total) by mouth 2 (two) times a day Take 1 tablet once a day for 2 weeks. Then take 1 tablet in the morning and one tablet in the evening., Disp: 60 tablet, Rfl: 5    traZODone (DESYREL) 50 mg tablet, Take 1 tablet by mouth twice daily, Disp: 180 tablet, Rfl: 1    vitamin B-12 (VITAMIN B-12) 1,000 mcg tablet, Take 1 tablet (1,000 mcg total) by mouth daily, Disp: 90 tablet, Rfl: 3    Food Intake and Lifestyle Assessment   Food Intake Assessment completed via {oral intake:94976}  Breakfast: ***  Snack: ***   Lunch: ***  Snack: ***  Dinner: ***  Snack: ***  Beverage intake: {beverage intake:90446}  Diet texture/stage: {diet texture/stage:69346}  Protein supplement: ***  Estimated protein intake per day: ***  Estimated fluid intake per day: ***  Meals eaten away from home: ***  Typical meal pattern: *** meals per day and *** snacks per day  Eating Behaviors: {NUT Eating Behaviors:75427}    Food allergies or intolerances: ***  Cultural or Scientologist considerations: ***    Vitamin and Mineral regimen ***    Physical Assessment  Nutrition Related Findings  {NUT Physical Assessment:35105}    Physical Activity  Types of exercise: {NUT EXERCISE TYPE:21435}  Current physical limitations: ***    Psychosocial Assessment   Support systems: {nutrition support systems:81857}  Socioeconomic factors: ***    Nutrition Diagnosis  Diagnosis: {LV LM WT LOSS NUTRITIONAL DX:93515}  Related to: {LV LM WT LOSS RELATED TO:61960}  As Evidenced by: {LV LM WT LOSS AS EVIDENCED BY:35657}     Nutrition Prescription: Recommend the following diet  {nutrition prescription:65202}    Meal Plan ( Sam/Pro/Carb)   Breakfast: 200-300/20/30  Snack:  150/>5/20  Lunch: 300/30/30-45  Snack: 150/>5/20  Dinner: 300/30/30-45  Snack: 150/>5/20       Interventions and Teaching   Patient educated on post-op nutrition guidelines.       Patient educated and handouts provided.  {bariatric patient education:84166}    Patient is not currently pregnant and doesn't desire to become pregnant a minimum of one year post-op    Education provided to: {Patient/Family/Caregiver:48585}    Barriers to learning: {NUT Barriers to Tx:60799}    Readiness to change: {dx readiness to change:56015}    Comprehension: {RESPONSES; PT EDU:33715}     Expected Compliance: {GOOD:93137}    Recommendations  Pt is an appropriate candidate for surgery. {YES NO NA ORDERS [facility]:82300}  Pt should make the following changes and then be reassessed for weight loss surgery: ***     Evaluation/Monitoring   {dietitian to monitor: 85576}    Goals  {GOALS:18257}     Time Spent:   {Time; intervals:79524}

## 2025-01-20 NOTE — PROGRESS NOTES
Bariatric Nutrition Assessment Note    Pt is level 2 for Revision      Type of surgery  Vertical sleeve gastrectomy  Surgery Date: [facility] 2019  5 years  post-op  Surgery : robotic assisted paraesophageal hernia repair with or without mesh, possible magnetic sphincter augmentation device placement (LINX).   Surgery date 7/23/2024  6 months post op   Surgeon: Dr. Mansi Mauro  Pre op - conversion to ANGEL  Expected date : July - August 2025  Surgeon Dr Brad Mauro     Pt ordered mounjaro by PCP to assist with BS control and weight loss     Nutrition Assessment   Aristeo ATWOOD Rafael  64 y.o.  male  There were no vitals taken for this visit.    Wt Readings from Last 3 Encounters:   01/07/25 132 kg (291 lb)   01/02/25 132 kg (290 lb)   11/14/24 132 kg (291 lb 3.2 oz)        Vipul Dempsey Equation:  2212  Estimated calories for weight maintenance:  2654  ( sedentary  )   Estimated calories for weight loss 8491-9927 ( 1-2# per wk wt loss - sedentary )  Estimated protein needs  grams per day (1.0-1.2gms/kg IBW )  Estimated fluid needs  oz per day (30-35 ml/kg IBW )      Weight on Day of Weight Loss Surgery: 375  Weight in (lb) to have BMI = 25: 192.7  Pre-Op Excess Wt: 182.3  Post-Op Wt Loss: 78.5#/ 43% EBWL/ 21 % TBWL  in 5 year(s)  James wt = 220 lbs - pt reports inability to tolerate solids contributed to low wt post op     Review of History and Medications   Past Medical History:   Diagnosis Date    Acute on chronic diastolic congestive heart failure (HCC)     Altered gait     Alzheimer disease (HCC)     per patients,,early onset     Angina pectoris (HCC)     Anxiety     Arthritis     Brain aneurysm     coils placed    Cardiac disease     Chest pain 01/13/2016    Chronic kidney disease     Chronic pain     back/ right groin and rle- has morphine pump    Constipation     COPD (chronic obstructive pulmonary disease) (HCC)     Coronary artery disease     Decubital ulcer     sacral decub-occured 5/2019-sees  wound care/debide in OR today 6/6/2019    Dependent on walker for ambulation     w/c for long distance    Depression     Diabetes mellitus ([facility])     insulin dependent -- pt states no longer dependent on insulin    Difficulty walking     Dizziness     occ    Dysphagia     Enlarged prostate     Esophageal stricture In file    Esophageal varices ([facility])     Fall     Fall at home 05/03/2019    GERD (gastroesophageal reflux disease)     Heart failure ([facility])     Hiatal hernia     Hx of gastric bypass 11/19/2018    states Nov 2019    Hypercholesterolemia     Hypertension     Ingrown toenail     MI (myocardial infarction) ([facility])     2017- stents x2    Migraine     Morbid obesity ([facility])     gastric bypass sleeve 11/2018-wt loss 125 lb    Myocardial infarction ([facility]) 2003    Neuropathy     Obesity 2003    Oxygen dependent     Q HS  2LPM with CPAP and prn during day 2-3 LPM  ( pt states no longer needs oxygen )    Pressure injury of skin     Pressure injury of skin of sacral region 06/03/2019    Added automatically from request for surgery [id number]    Renal disorder     Shortness of breath     Skin abnormality     sacral wound - covered with pad    Sleep apnea     couldnt tolerate CPAP    Stented coronary artery     Stroke ([facility])     vision loss b/l  2005, residual R leg weakness    Type 2 diabetes mellitus with diabetic neuropathy, with long-term current use of insulin ([facility]) 10/18/2019    Type 2 diabetes mellitus with renal complication ([facility])     insulin dependent    Type 2 diabetes mellitus, with long-term current use of insulin ([facility]) 10/11/2017    Transitioned From: Diabetes mellitus type 2, uncontrolled    Urinary frequency     Use of cane as ambulatory aid     Wears dentures     Wears glasses     Wears glasses     Wheelchair dependent     states uses walker & cane     Past Surgical History:   Procedure Laterality Date    BACK SURGERY      BRAIN SURGERY      CARDIAC CATHETERIZATION      with stents    CARDIAC CATHETERIZATION N/A  8/18/2023    Procedure: Cardiac Coronary Angiogram;  Surgeon: Horacio Weiner MD;  [facility];  Service: Cardiology    CEREBRAL ANEURYSM REPAIR      with coils    COLONOSCOPY      ESOPHAGOGASTRODUODENOSCOPY N/A 7/1/2016    Procedure: ESOPHAGOGASTRODUODENOSCOPY (EGD);  Surgeon: Reno Christiansen MD;  Location: [facility];  Service:     GASTRIC BYPASS  11/19/2018    HERNIA REPAIR      HIATAL HERNIA REPAIR      INFUSION PUMP IMPLANTATION Left     morphine    INTRATHECAL PUMP IMPLANTATION Left 7/9/2020    Procedure: REVISION INTRATHECAL PAIN PUMP POCKET, LEFT ABDOMEN;  Surgeon: Homero Cho MD;  [facility];  Service: Neurosurgery    KNEE ARTHROSCOPY Right     KNEE ARTHROSCOPY Right     PERONEAL NERVE DECOMPRESSION Right     NJ DEBRIDEMENT OPEN WOUND FIRST 20 SQ CM/< N/A 6/6/2019    Procedure: EXCISIONAL DEBRIDEMENT OF SACRAL DECUBITUS ULCER;  Surgeon: Siddhartha Omer MD;  [facility];  Service: General    NJ ESOPHAGOGASTRODUODENOSCOPY TRANSORAL DIAGNOSTIC N/A 2/27/2017    Procedure: ESOPHAGOGASTRODUODENOSCOPY (EGD);  Surgeon: Reno Christiansen MD;  [facility]: [facility];  Service: Gastroenterology    NJ ESOPHAGOGASTRODUODENOSCOPY TRANSORAL DIAGNOSTIC N/A 8/23/2018    Procedure: ESOPHAGOGASTRODUODENOSCOPY (EGD) with biopsy;  Surgeon: Reno Christiansen MD;  [facility];  Service: Gastroenterology    NJ IMPLTJ/RPLCMT ITHCL/EDRL DRUG NFS PRGRBL PUMP Left 10/13/2020    Procedure: EXPLORATION OF INTRATHECAL PAIN PUMP SYSTEM INTEGRITY FOR POSSIBLE REPLACEMENT OF CATHETER AND PUMP.;  Surgeon: Homero Cho MD;  Location: [facility];  Service: Neurosurgery    NJ IMPLTJ/RPLCMT ITHCL/EDRL DRUG NFS SUBQ RSVR N/A 1/19/2017    Procedure: REMOVAL / EXCHANGE INTRATHECAL PAIN PUMP;  Surgeon: Que Leonard MD;  [facility];  Service: Orthopedics    NJ IMPLTJ/RPLCMT ITHCL/EDRL DRUG NFS SUBQ RSVR N/A 5/16/2016    Procedure: REPLACEMENT AND PROGRAM PUMP ;  Surgeon: Que Leonard MD;  [facility];   Service: Orthopedics    AZ LAPS RPR PARAESPHGL HRNA INCL FUNDPLSTY W/MESH N/A 7/23/2024    Procedure: REPAIR HERNIA PARAESOPHAGEAL  W ROBOTICS & MAGNETIC SPINCTER AUGMENTATION DEVISE;  Surgeon: Mansi Mauro MD;  [facility];  Service: Bariatrics    AZ PRQ IMPLTJ NSTIM ELECTRODE ARRAY EPIDURAL Right 3/17/2021    Procedure: INSERTION THORACIC DORSAL COLUMN SPINAL CORD STIMULATOR PERCUTANEOUS W IMPLANTABLE PULSE GENERATOR, RIGHT;  Surgeon: Homero Cho MD;  [facility];  Service: Neurosurgery     Social History     Socioeconomic History    Marital status: /Civil Union     Spouse name: Reva    Number of children: 5    Years of education: Not on file    Highest education level: GED or equivalent   Occupational History    Not on file   Tobacco Use    Smoking status: Never    Smokeless tobacco: Never   Vaping Use    Vaping status: Never Used   Substance and Sexual Activity    Alcohol use: Not Currently    Drug use: Not Currently     Types: Marijuana     Comment: Medical card is for CBD cream . states no THC use    Sexual activity: Not Currently     Partners: Female   Other Topics Concern    Not on file   Social History Narrative    Not on file     Social Drivers of Health     Financial Resource Strain: High Risk (9/19/2023)    Overall Financial Resource Strain (CARDIA)     Difficulty of Paying Living Expenses: Very hard   Food Insecurity: Food Insecurity Present (11/14/2024)    Hunger Vital Sign     Worried About Running Out of Food in the Last Year: Often true     Ran Out of Food in the Last Year: Often true   Transportation Needs: No Transportation Needs (11/14/2024)    PRAPARE - Transportation     Lack of Transportation (Medical): No     Lack of Transportation (Non-Medical): No   Physical Activity: Inactive (9/6/2019)    Exercise Vital Sign     Days of Exercise per Week: 0 days     Minutes of Exercise per Session: 0 min   Stress: Stress Concern Present (9/6/2019)    [employer]  [employer] - Occupational Stress Questionnaire     Feeling of Stress : Very much   Social Connections: Moderately Isolated (9/6/2019)    Social Connection and Isolation Panel [NHANES]     Frequency of Communication with Friends and Family: Never     Frequency of Social Gatherings with Friends and Family: Twice a week     Attends [employer] Services: 1 to 4 times per year     Active Member of Clubs or Organizations: No     Attends Club or Organization Meetings: Never     Marital Status:    Intimate Partner Violence: Not At Risk (9/6/2019)    Humiliation, Afraid, Rape, and Kick questionnaire     Fear of Current or Ex-Partner: No     Emotionally Abused: No     Physically Abused: No     Sexually Abused: No   Housing Stability: Low Risk  (11/14/2024)    Housing Stability Vital Sign     Unable to Pay for Housing in the Last Year: No     Number of Times Moved in the Last Year: 1     Homeless in the Last Year: No       Current Outpatient Medications:     albuterol (2.5 mg/3 mL) 0.083 % nebulizer solution, USE 1 VIAL IN NEBULIZER EVERY 6 HOURS AS NEEDED FOR WHEEZING OR SHORTNESS OF BREATH, Disp: 720 mL, Rfl: 2    amLODIPine (NORVASC) 5 mg tablet, Take 1 tablet (5 mg total) by mouth daily, Disp: 90 tablet, Rfl: 0    ammonium lactate (LAC-HYDRIN) 12 % cream, APPLY  CREAM TOPICALLY TO AFFECTED AREA TWICE DAILY, Disp: 385 g, Rfl: 5    aspirin 81 mg chewable tablet, Chew 1 tablet (81 mg total) daily, Disp: 90 tablet, Rfl: 1    atorvastatin (LIPITOR) 80 mg tablet, Take 1 tablet (80 mg total) by mouth daily after dinner, Disp: 90 tablet, Rfl: 1    baclofen 10 mg tablet, Take 10 mg by mouth 3 (three) times a day, Disp: , Rfl:     busPIRone (BUSPAR) 5 mg tablet, Take 1 tablet (5 mg total) by mouth 2 (two) times a day, Disp: 180 tablet, Rfl: 1    CALCIUM PO, Take 1 tablet by mouth daily, Disp: , Rfl:     carvedilol (COREG) 12.5 mg tablet, Take 1 tablet by mouth twice daily with food, Disp: 60 tablet, Rfl: 0     clopidogrel (PLAVIX) 75 mg tablet, Take 1 tablet (75 mg total) by mouth daily, Disp: 90 tablet, Rfl: 1    Continuous Blood Gluc Transmit (Dexcom G6 Transmitter) MISC, 1 TRANSMITTED EVERY 3 MONTHS FOR CONTINUOUS GLUCOSE MONITORING, Disp: 1 each, Rfl: 3    Continuous Glucose Sensor (Dexcom G6 Sensor) MISC, 3 PACK SENSOR FOR CONTINUOUS GLUCOSE MONITORING, Disp: 9 each, Rfl: 1    docusate sodium (COLACE) 100 mg capsule, Take 1 capsule (100 mg total) by mouth 2 (two) times a day, Disp: 180 capsule, Rfl: 3    DULoxetine (Cymbalta) 30 mg delayed release capsule, Take 1 capsule (30 mg total) by mouth daily, Disp: 30 capsule, Rfl: 3    Empagliflozin (JARDIANCE) 10 MG TABS tablet, Take 1 tablet (10 mg total) by mouth every morning, Disp: 90 tablet, Rfl: 1    ergocalciferol (VITAMIN D2) 50,000 units, Take 1 capsule (50,000 Units total) by mouth 2 (two) times a week, Disp: 24 capsule, Rfl: 0    Fluticasone-Salmeterol (Advair) 500-50 mcg/dose inhaler, INHALE 1 PUFF TWICE DAILY RINSE . MOUTH AFTER USE, Disp: 180 blister, Rfl: 1    folic acid (FOLVITE) 1 mg tablet, Take 1 tablet by mouth once daily, Disp: 90 tablet, Rfl: 1    furosemide (LASIX) 40 mg tablet, Take once in AM daily. Take once daily in PM PRN weight gain, edema., Disp: 180 tablet, Rfl: 3    gabapentin (NEURONTIN) 300 mg capsule, Take 1 capsule (300 mg total) by mouth daily as needed (headache), Disp: 30 capsule, Rfl: 5    gabapentin (NEURONTIN) 800 mg tablet, Take 1 tablet (800 mg total) by mouth 4 (four) times a day, Disp: 360 tablet, Rfl: 1    hydrocortisone 1 % lotion, Apply topically if needed for rash, Disp: 59 mL, Rfl: 1    lidocaine (LIDODERM) 5 %, Apply 1 patch topically daily back, Disp: , Rfl:     losartan (COZAAR) 50 mg tablet, Take 1 tablet (50 mg total) by mouth daily, Disp: 100 tablet, Rfl: 1    methocarbamol (ROBAXIN) 750 mg tablet, TAKE 1 TABLET (750 MG TOTAL) BY MOUTH EVERY 6 (SIX) HOURS AS NEEDED FOR MUSCLE SPASMS, Disp: 120 tablet, Rfl: 0     naloxegol oxalate (MOVANTIK) 25 MG tablet, Take 1 tablet (25 mg total) by mouth daily in the early morning, Disp: 30 tablet, Rfl: 5    nitroglycerin (NITROSTAT) 0.4 mg SL tablet, DISSOLVE ONE TABLET UNDER THE TONGUE EVERY 5 MINUTES AS NEEDED FOR CHEST PAIN.  DO NOT EXCEED A TOTAL OF 3 DOSES IN 15 MINUTES, Disp: 25 tablet, Rfl: 0    nystatin (MYCOSTATIN) cream, Apply 2 g (1 Application total) topically 2 (two) times a day, Disp: 15 g, Rfl: 0    potassium chloride (Klor-Con M20) 20 mEq tablet, Take 1 tablet (20 mEq total) by mouth daily, Disp: 90 tablet, Rfl: 1    ranolazine (RANEXA) 1000 MG SR tablet, TAKE 1 TABLET (1,000 MG TOTAL) BY MOUTH EVERY 12 HOURS, Disp: 60 tablet, Rfl: 3    tamsulosin (FLOMAX) 0.4 mg, TAKE 1 CAPSULE BY MOUTH EVERY DAY WITH DINNER, Disp: 90 capsule, Rfl: 1    Tirzepatide (Mounjaro) 7.5 MG/0.5ML SOAJ, Once a week, Disp: 2 mL, Rfl: 6    topiramate (Topamax) 25 mg tablet, Take 1 tablet (25 mg total) by mouth 2 (two) times a day Take 1 tablet once a day for 2 weeks. Then take 1 tablet in the morning and one tablet in the evening., Disp: 60 tablet, Rfl: 5    traZODone (DESYREL) 50 mg tablet, Take 1 tablet by mouth twice daily, Disp: 180 tablet, Rfl: 1    vitamin B-12 (VITAMIN B-12) 1,000 mcg tablet, Take 1 tablet (1,000 mcg total) by mouth daily, Disp: 90 tablet, Rfl: 3    Food Intake and Lifestyle Assessment   Food Intake Assessment completed via usual diet recall  Breakfast: fluids   Snack: 0   Lunch: toast   Snack: 0  Dinner: small piece of chicken   Snack: 0  Beverage intake: water and regular vitamin water   Diet texture/stage: liquid  Protein supplement: no  Estimated protein intake per day: hard to determine based on recall   Pt is only able to tolerate liquids   Estimated fluid intake per day: 64 oz or more  Meals eaten away from home:  not assessed   Typical meal pattern: 2 meals per day and 0-1 snacks per day  Eating Behaviors: Pt drinks fluid all day   When able to tolerate solid foods-  grazed throughout the day due to inability to tolerate any volume     Food allergies or intolerances: none noted   Cultural or Adventism considerations: N/A    Vitamin and Mineral regimen  inadequate per CRNP    Physical Assessment  Nutrition Related Findings  Constipation, Nausea, and Vomiting  Pt reports constipation with chronic pain medications  Ongoing regurgitation, vomiting and nausea     Physical Activity  Types of exercise: li,ited   Current physical limitations: cerebral aneurysm     Psychosocial Assessment   Support systems: spouse  Socioeconomic factors: not assessed     Nutrition Diagnosis  Diagnosis: Inadequate protein intake (NI-5.7.3)  Related to: Altered GI function  As Evidenced by: Patient report      Nutrition Prescription: Recommend the following diet  Small frequent meals - liquids to soft as tolerated  Include 2 protein shakes per day     Interventions and Teaching   Patient educated on post-op nutrition guidelines.       Patient educated and handouts provided  .{bariatric patient education:76175}    Education provided to: patient    Barriers to learning: No barriers identified    Readiness to change: action    Comprehension: verbalizes understanding     Expected Compliance: good    Recommendations  Pt is an appropriate candidate for surgery. {YES NO NA ORDERS [facility]:21823}  Pt should make the following changes and then be reassessed for weight loss surgery: ***     Evaluation/Monitoring   Eating pattern as discussed Tolerance of nutrition prescription Body weight Lab values Physical activity Bowel pattern    Goals  {GOALS:75059}    Time Spent:   60 Minutes
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
## 2025-01-22 ENCOUNTER — OFFICE VISIT (OUTPATIENT)
Dept: BARIATRICS | Facility: CLINIC | Age: 65
End: 2025-01-22

## 2025-01-22 ENCOUNTER — APPOINTMENT (OUTPATIENT)
Dept: LAB | Facility: HOSPITAL | Age: 65
End: 2025-01-22
Payer: COMMERCIAL

## 2025-01-22 VITALS — BODY MASS INDEX: 38.47 KG/M2 | WEIGHT: 291.6 LBS

## 2025-01-22 DIAGNOSIS — Z98.84 BARIATRIC SURGERY STATUS: ICD-10-CM

## 2025-01-22 DIAGNOSIS — E11.65 TYPE 2 DIABETES MELLITUS WITH HYPERGLYCEMIA, WITHOUT LONG-TERM CURRENT USE OF INSULIN (HCC): ICD-10-CM

## 2025-01-22 DIAGNOSIS — Z98.84 BARIATRIC SURGERY STATUS: Primary | ICD-10-CM

## 2025-01-22 DIAGNOSIS — Z01.818 PREOP TESTING: ICD-10-CM

## 2025-01-22 LAB
ALBUMIN SERPL BCG-MCNC: 3.7 G/DL (ref 3.5–5)
ALP SERPL-CCNC: 81 U/L (ref 34–104)
ALT SERPL W P-5'-P-CCNC: 16 U/L (ref 7–52)
ANION GAP SERPL CALCULATED.3IONS-SCNC: 8 MMOL/L (ref 4–13)
AST SERPL W P-5'-P-CCNC: 18 U/L (ref 13–39)
BILIRUB SERPL-MCNC: 0.7 MG/DL (ref 0.2–1)
BUN SERPL-MCNC: 13 MG/DL (ref 5–25)
CALCIUM SERPL-MCNC: 9.2 MG/DL (ref 8.4–10.2)
CHLORIDE SERPL-SCNC: 102 MMOL/L (ref 96–108)
CO2 SERPL-SCNC: 30 MMOL/L (ref 21–32)
CREAT SERPL-MCNC: 1.11 MG/DL (ref 0.6–1.3)
ERYTHROCYTE [DISTWIDTH] IN BLOOD BY AUTOMATED COUNT: 13.6 % (ref 11.6–15.1)
GFR SERPL CREATININE-BSD FRML MDRD: 70 ML/MIN/1.73SQ M
GLUCOSE P FAST SERPL-MCNC: 125 MG/DL (ref 65–99)
HCT VFR BLD AUTO: 46.8 % (ref 36.5–49.3)
HGB BLD-MCNC: 14.7 G/DL (ref 12–17)
MCH RBC QN AUTO: 29.6 PG (ref 26.8–34.3)
MCHC RBC AUTO-ENTMCNC: 31.4 G/DL (ref 31.4–37.4)
MCV RBC AUTO: 94 FL (ref 82–98)
PLATELET # BLD AUTO: 284 THOUSANDS/UL (ref 149–390)
PMV BLD AUTO: 10.3 FL (ref 8.9–12.7)
POTASSIUM SERPL-SCNC: 3.8 MMOL/L (ref 3.5–5.3)
PROT SERPL-MCNC: 7.1 G/DL (ref 6.4–8.4)
RBC # BLD AUTO: 4.96 MILLION/UL (ref 3.88–5.62)
SODIUM SERPL-SCNC: 140 MMOL/L (ref 135–147)
WBC # BLD AUTO: 12.23 THOUSAND/UL (ref 4.31–10.16)

## 2025-01-22 PROCEDURE — 85027 COMPLETE CBC AUTOMATED: CPT

## 2025-01-22 PROCEDURE — RECHECK

## 2025-01-22 PROCEDURE — 80053 COMPREHEN METABOLIC PANEL: CPT

## 2025-01-22 PROCEDURE — 36415 COLL VENOUS BLD VENIPUNCTURE: CPT

## 2025-01-22 NOTE — PROGRESS NOTES
Met with patient to review the requirements for Bariatric Revision surgery. Patient was provided a check list to be completed before case can be preauthorized. It has been explained that the revision surgery cannot be guaranteed approved by the insurance company. Patient understood and was seen by the  and Dietitian and given the next appointment in the office. Asked if there were any questions. Patient was provided my contact information for future questions or concerns. Procedures

## 2025-01-22 NOTE — PROGRESS NOTES
"Bariatric Nutrition Assessment Note    PLAN:  \"We discussed weight loss options now that his reflux has been addressed. He is on Mounjaro (started 2.5 months ago), prescribed by PCP. We discussed option for weight loss which included ANGEL and bypass. Patient is not interested in Bypass. He is interested in ANGEL. We discussed the procedure in detail and he would like to proceed with the level 2 revision process\"       Type of surgery  Vertical sleeve gastrectomy  Surgery Date: 11/19/2018  6 years post-op  Surgeon: Dr Jose Pritchard at [facility]  364.2lbs on day of surgery  Lowest weight 219lbs at 1 year post-op  Pt reports 410lbs when started the program  [id number] got up to 250lbs, regained back up to 320lbs    Surgery : robotic assisted paraesophageal hernia repair with magnetic sphincter augmentation device placement (LINX)  Surgery date 7/23/2024  6 months post-op  Surgeon: Dr. Mansi Mauro    Pre op - conversion to ANGEL  Level 2 Revision: 6 months required  Expected date : July - August 2025  Surgeon Dr Brad Mauro     Pt ordered mounjaro by PCP to assist with BS control and weight loss   Reports taking for 4-5 months now and has not seen any weight loss-yesterday took first 7.5mg dose  Takes two water pills in the afternoon    Nutrition Assessment   Aristeo Hall  64 y.o.  male  Wt 132 kg (291 lb 9.6 oz)   BMI 38.47 kg/m²   Wt Readings from Last 3 Encounters:   01/22/25 132 kg (291 lb 9.6 oz)   01/07/25 132 kg (291 lb)   01/02/25 132 kg (290 lb)      Vipul Dempsey Equation:  2212  Estimated calories for weight maintenance:  2654  ( sedentary  )   Estimated calories for weight loss 3965-4296 ( 1-2# per wk wt loss - sedentary )  Estimated protein needs  grams per day (1.0-1.2gms/kg IBW )  Estimated fluid needs  oz per day (30-35 ml/kg IBW )      Per pt report:  Pt reports weighed 410lbs when started the surgical program at [facility]  Lowest weight 219lbs at 1 year post-op  [id number] got up to " 250lbs, regained back up to 320lbs    Weight on Day of Weight Loss Surgery: 364.2lbs  Weight in (lb) to have BMI = 25: 192.7  Pre-Op Excess Wt: 171.5lbs  Post-Op Wt Loss: 72.6#/ 42.3% EBWL/ 20% TBWL  in 6 year(s)    Review of History and Medications   Past Medical History:   Diagnosis Date    Acute on chronic diastolic congestive heart failure (HCC)     Altered gait     Alzheimer disease (HCC)     per patients,,early onset     Angina pectoris (HCC)     Anxiety     Arthritis     Brain aneurysm     coils placed    Cardiac disease     Chest pain 01/13/2016    Chronic kidney disease     Chronic pain     back/ right groin and rle- has morphine pump    Constipation     COPD (chronic obstructive pulmonary disease) (HCC)     Coronary artery disease     Decubital ulcer     sacral decub-occured 5/2019-sees wound care/debide in OR today 6/6/2019    Dependent on walker for ambulation     w/c for long distance    Depression     Diabetes mellitus (HCC)     insulin dependent -- pt states no longer dependent on insulin    Difficulty walking     Dizziness     occ    Dysphagia     Enlarged prostate     Esophageal stricture In file    Esophageal varices (HCC)     Fall     Fall at home 05/03/2019    GERD (gastroesophageal reflux disease)     Heart failure ([facility])     Hiatal hernia     Hx of gastric bypass 11/19/2018    states Nov 2019    Hypercholesterolemia     Hypertension     Ingrown toenail     MI (myocardial infarction) (HCC)     2017- stents x2    Migraine     Morbid obesity (HCC)     gastric bypass sleeve 11/2018-wt loss 125 lb    Myocardial infarction (HCC) 2003    Neuropathy     Obesity 2003    Oxygen dependent     Q HS  2LPM with CPAP and prn during day 2-3 LPM  ( pt states no longer needs oxygen )    Pressure injury of skin     Pressure injury of skin of sacral region 06/03/2019    Added automatically from request for surgery [id number]    Renal disorder     Shortness of breath     Skin abnormality     sacral wound - covered with  pad    Sleep apnea     couldnt tolerate CPAP    Stented coronary artery     Stroke ([facility])     vision loss b/l  2005, residual R leg weakness    Type 2 diabetes mellitus with diabetic neuropathy, with long-term current use of insulin ([facility]) 10/18/2019    Type 2 diabetes mellitus with renal complication ([facility])     insulin dependent    Type 2 diabetes mellitus, with long-term current use of insulin ([facility]) 10/11/2017    Transitioned From: Diabetes mellitus type 2, uncontrolled    Urinary frequency     Use of cane as ambulatory aid     Wears dentures     Wears glasses     Wears glasses     Wheelchair dependent     states uses walker & cane     Past Surgical History:   Procedure Laterality Date    BACK SURGERY      BRAIN SURGERY      CARDIAC CATHETERIZATION      with stents    CARDIAC CATHETERIZATION N/A 8/18/2023    Procedure: Cardiac Coronary Angiogram;  Surgeon: Horacio Weiner MD;  Location: [facility];  Service: Cardiology    CEREBRAL ANEURYSM REPAIR      with coils    COLONOSCOPY      ESOPHAGOGASTRODUODENOSCOPY N/A 7/1/2016    Procedure: ESOPHAGOGASTRODUODENOSCOPY (EGD);  Surgeon: Reno Christiansen MD;  Location: [facility];  Service:     GASTRIC BYPASS  11/19/2018    HERNIA REPAIR      HIATAL HERNIA REPAIR      INFUSION PUMP IMPLANTATION Left     morphine    INTRATHECAL PUMP IMPLANTATION Left 7/9/2020    Procedure: REVISION INTRATHECAL PAIN PUMP POCKET, LEFT ABDOMEN;  Surgeon: Homero Cho MD;  Location: [facility];  Service: Neurosurgery    KNEE ARTHROSCOPY Right     KNEE ARTHROSCOPY Right     PERONEAL NERVE DECOMPRESSION Right     ND DEBRIDEMENT OPEN WOUND FIRST 20 SQ CM/< N/A 6/6/2019    Procedure: EXCISIONAL DEBRIDEMENT OF SACRAL DECUBITUS ULCER;  Surgeon: Siddhartha Omer MD;  [facility];  Service: General    ND ESOPHAGOGASTRODUODENOSCOPY TRANSORAL DIAGNOSTIC N/A 2/27/2017    Procedure: ESOPHAGOGASTRODUODENOSCOPY (EGD);  Surgeon: Reno Christiansen MD;  [facility]: [facility];  Service: Gastroenterology    ND  ESOPHAGOGASTRODUODENOSCOPY TRANSORAL DIAGNOSTIC N/A 8/23/2018    Procedure: ESOPHAGOGASTRODUODENOSCOPY (EGD) with biopsy;  Surgeon: Reno Christiansen MD;  [facility];  Service: Gastroenterology    VT IMPLTJ/RPLCMT ITHCL/EDRL DRUG NFS PRGRBL PUMP Left 10/13/2020    Procedure: EXPLORATION OF INTRATHECAL PAIN PUMP SYSTEM INTEGRITY FOR POSSIBLE REPLACEMENT OF CATHETER AND PUMP.;  Surgeon: Homero Cho MD;  Location: [facility];  Service: Neurosurgery    VT IMPLTJ/RPLCMT ITHCL/EDRL DRUG NFS SUBQ RSVR N/A 1/19/2017    Procedure: REMOVAL / EXCHANGE INTRATHECAL PAIN PUMP;  Surgeon: Que Leonard MD;  [facility];  Service: Orthopedics    VT IMPLTJ/RPLCMT ITHCL/EDRL DRUG NFS SUBQ RSVR N/A 5/16/2016    Procedure: REPLACEMENT AND PROGRAM PUMP ;  Surgeon: Que Leonard MD;  [facility];  Service: Orthopedics    VT LAPS RPR PARAESPHGL HRNA INCL FUNDPLSTY W/MESH N/A 7/23/2024    Procedure: REPAIR HERNIA PARAESOPHAGEAL  W ROBOTICS & MAGNETIC SPINCTER AUGMENTATION DEVISE;  Surgeon: Mansi Mauro MD;  [facility];  Service: Bariatrics    VT PRQ IMPLTJ NSTIM ELECTRODE ARRAY EPIDURAL Right 3/17/2021    Procedure: INSERTION THORACIC DORSAL COLUMN SPINAL CORD STIMULATOR PERCUTANEOUS W IMPLANTABLE PULSE GENERATOR, RIGHT;  Surgeon: Homero Cho MD;  [facility];  Service: Neurosurgery     Social History     Socioeconomic History    Marital status: /Civil Union     Spouse name: Reva    Number of children: 5    Years of education: Not on file    Highest education level: GED or equivalent   Occupational History    Not on file   Tobacco Use    Smoking status: Never    Smokeless tobacco: Never   Vaping Use    Vaping status: Never Used   Substance and Sexual Activity    Alcohol use: Not Currently    Drug use: Not Currently     Types: Marijuana     Comment: Medical card is for CBD cream . states no THC use    Sexual activity: Not Currently     Partners: Female   Other Topics Concern    Not on  file   Social History Narrative    Not on file     Social Drivers of Health     Financial Resource Strain: High Risk (9/19/2023)    Overall Financial Resource Strain (CARDIA)     Difficulty of Paying Living Expenses: Very hard   Food Insecurity: Food Insecurity Present (11/14/2024)    Hunger Vital Sign     Worried About Running Out of Food in the Last Year: Often true     Ran Out of Food in the Last Year: Often true   Transportation Needs: No Transportation Needs (11/14/2024)    PRAPARE - Transportation     Lack of Transportation (Medical): No     Lack of Transportation (Non-Medical): No   Physical Activity: Inactive (9/6/2019)    Exercise Vital Sign     Days of Exercise per Week: 0 days     Minutes of Exercise per Session: 0 min   Stress: Stress Concern Present (9/6/2019)    [employer] [place] [employer] - Occupational Stress Questionnaire     Feeling of Stress : Very much   Social Connections: Moderately Isolated (9/6/2019)    Social Connection and Isolation Panel [NHANES]     Frequency of Communication with Friends and Family: Never     Frequency of Social Gatherings with Friends and Family: Twice a week     Attends Sabianism Services: 1 to 4 times per year     Active Member of Clubs or Organizations: No     Attends Club or Organization Meetings: Never     Marital Status:    Intimate Partner Violence: Not At Risk (9/6/2019)    Humiliation, Afraid, Rape, and Kick questionnaire     Fear of Current or Ex-Partner: No     Emotionally Abused: No     Physically Abused: No     Sexually Abused: No   Housing Stability: Low Risk  (11/14/2024)    Housing Stability Vital Sign     Unable to Pay for Housing in the Last Year: No     Number of Times Moved in the Last Year: 1     Homeless in the Last Year: No       Current Outpatient Medications:     albuterol (2.5 mg/3 mL) 0.083 % nebulizer solution, USE 1 VIAL IN NEBULIZER EVERY 6 HOURS AS NEEDED FOR WHEEZING OR SHORTNESS OF BREATH, Disp: 720 mL, Rfl: 2     amLODIPine (NORVASC) 5 mg tablet, Take 1 tablet (5 mg total) by mouth daily, Disp: 90 tablet, Rfl: 0    ammonium lactate (LAC-HYDRIN) 12 % cream, APPLY  CREAM TOPICALLY TO AFFECTED AREA TWICE DAILY, Disp: 385 g, Rfl: 5    aspirin 81 mg chewable tablet, Chew 1 tablet (81 mg total) daily, Disp: 90 tablet, Rfl: 1    atorvastatin (LIPITOR) 80 mg tablet, Take 1 tablet (80 mg total) by mouth daily after dinner, Disp: 90 tablet, Rfl: 1    baclofen 10 mg tablet, Take 10 mg by mouth 3 (three) times a day, Disp: , Rfl:     busPIRone (BUSPAR) 5 mg tablet, Take 1 tablet (5 mg total) by mouth 2 (two) times a day, Disp: 180 tablet, Rfl: 1    CALCIUM PO, Take 1 tablet by mouth daily, Disp: , Rfl:     carvedilol (COREG) 12.5 mg tablet, Take 1 tablet by mouth twice daily with food, Disp: 60 tablet, Rfl: 0    clopidogrel (PLAVIX) 75 mg tablet, Take 1 tablet (75 mg total) by mouth daily, Disp: 90 tablet, Rfl: 1    Continuous Blood Gluc Transmit (Dexcom G6 Transmitter) MISC, 1 TRANSMITTED EVERY 3 MONTHS FOR CONTINUOUS GLUCOSE MONITORING, Disp: 1 each, Rfl: 3    Continuous Glucose Sensor (Dexcom G6 Sensor) MISC, 3 PACK SENSOR FOR CONTINUOUS GLUCOSE MONITORING, Disp: 9 each, Rfl: 1    docusate sodium (COLACE) 100 mg capsule, Take 1 capsule (100 mg total) by mouth 2 (two) times a day, Disp: 180 capsule, Rfl: 3    DULoxetine (Cymbalta) 30 mg delayed release capsule, Take 1 capsule (30 mg total) by mouth daily, Disp: 30 capsule, Rfl: 3    Empagliflozin (JARDIANCE) 10 MG TABS tablet, Take 1 tablet (10 mg total) by mouth every morning, Disp: 90 tablet, Rfl: 1    ergocalciferol (VITAMIN D2) 50,000 units, Take 1 capsule (50,000 Units total) by mouth 2 (two) times a week, Disp: 24 capsule, Rfl: 0    Fluticasone-Salmeterol (Advair) 500-50 mcg/dose inhaler, INHALE 1 PUFF TWICE DAILY RINSE . MOUTH AFTER USE, Disp: 180 blister, Rfl: 1    folic acid (FOLVITE) 1 mg tablet, Take 1 tablet by mouth once daily, Disp: 90 tablet, Rfl: 1    furosemide  (LASIX) 40 mg tablet, Take once in AM daily. Take once daily in PM PRN weight gain, edema., Disp: 180 tablet, Rfl: 3    gabapentin (NEURONTIN) 300 mg capsule, Take 1 capsule (300 mg total) by mouth daily as needed (headache), Disp: 30 capsule, Rfl: 5    gabapentin (NEURONTIN) 800 mg tablet, Take 1 tablet (800 mg total) by mouth 4 (four) times a day, Disp: 360 tablet, Rfl: 1    hydrocortisone 1 % lotion, Apply topically if needed for rash, Disp: 59 mL, Rfl: 1    lidocaine (LIDODERM) 5 %, Apply 1 patch topically daily back, Disp: , Rfl:     losartan (COZAAR) 50 mg tablet, Take 1 tablet (50 mg total) by mouth daily, Disp: 100 tablet, Rfl: 1    methocarbamol (ROBAXIN) 750 mg tablet, TAKE 1 TABLET (750 MG TOTAL) BY MOUTH EVERY 6 (SIX) HOURS AS NEEDED FOR MUSCLE SPASMS, Disp: 120 tablet, Rfl: 0    naloxegol oxalate (MOVANTIK) 25 MG tablet, Take 1 tablet (25 mg total) by mouth daily in the early morning, Disp: 30 tablet, Rfl: 5    nitroglycerin (NITROSTAT) 0.4 mg SL tablet, DISSOLVE ONE TABLET UNDER THE TONGUE EVERY 5 MINUTES AS NEEDED FOR CHEST PAIN.  DO NOT EXCEED A TOTAL OF 3 DOSES IN 15 MINUTES, Disp: 25 tablet, Rfl: 0    nystatin (MYCOSTATIN) cream, Apply 2 g (1 Application total) topically 2 (two) times a day, Disp: 15 g, Rfl: 0    potassium chloride (Klor-Con M20) 20 mEq tablet, Take 1 tablet (20 mEq total) by mouth daily, Disp: 90 tablet, Rfl: 1    ranolazine (RANEXA) 1000 MG SR tablet, TAKE 1 TABLET (1,000 MG TOTAL) BY MOUTH EVERY 12 HOURS, Disp: 60 tablet, Rfl: 3    tamsulosin (FLOMAX) 0.4 mg, TAKE 1 CAPSULE BY MOUTH EVERY DAY WITH DINNER, Disp: 90 capsule, Rfl: 1    Tirzepatide (Mounjaro) 7.5 MG/0.5ML SOAJ, Once a week, Disp: 2 mL, Rfl: 6    topiramate (Topamax) 25 mg tablet, Take 1 tablet (25 mg total) by mouth 2 (two) times a day Take 1 tablet once a day for 2 weeks. Then take 1 tablet in the morning and one tablet in the evening., Disp: 60 tablet, Rfl: 5    traZODone (DESYREL) 50 mg tablet, Take 1 tablet by  "mouth twice daily, Disp: 180 tablet, Rfl: 1    vitamin B-12 (VITAMIN B-12) 1,000 mcg tablet, Take 1 tablet (1,000 mcg total) by mouth daily, Disp: 90 tablet, Rfl: 3    Food Intake and Lifestyle Assessment   Food Intake Assessment completed via usual diet recall:  Reports some days does not eat anything  Does not eat breakfast  Some days has a piece of toast and half bottle of vitamin water  \"I don't eat a lot, I pick\"  Wife cuts up chicken breast- eats 2-3 bites, goes back and takes another 2-3 bites, does this all day  Goes back for a few more bites every 2 hours per pt  Drank some soup broth yesterday  Gets hungry at night  Sometimes 2 eggs and 3 piece farah for dinner-eats half, comes back later and eats the other half  Sleep 1-2am until 7am  Beverages: Regular or zero sugar vitamin water, tea with sweet n low-home brewed iced tea; 2-3 vitamin murray per day  Diet texture/stage: regular  Protein supplement: Ensure 1-2 per week: not sure what formula it is  Estimated protein intake per day: hard to determine based on recall   Estimated fluid intake per day: 64 oz or more  Meals eaten away from home:  not assessed   Typical meal pattern:   Eating Behaviors: grazes throughout the day due to inability to tolerate any volume   Food allergies or intolerances: vomits whenever he drinks milk, denies lactose intolerance or dairy allergy  Has no teeth- wife cuts up his food for him  Reports LINX device has almost resolved his heartburn/reflux  Cultural or Anabaptist considerations: N/A    Vitamin and Mineral regimen: no vitamins or supplements- \"I can't afford them\" about 1 year with no vitamins per pt report    Physical Assessment  Nutrition Related Findings  Constipation, Nausea, and Vomiting  Pt reports constipation with chronic pain medications  Ongoing regurgitation, vomiting and nausea-resolved after LINX    Physical Activity  Types of exercise: limited   Current physical limitations: cerebral aneurysm, walks with " "walker assistance    Psychosocial Assessment   Support systems: spouse, 5 kids  Socioeconomic factors: disabled    Nutrition Diagnosis  Diagnosis: Inadequate protein intake (NI-5.7.3)  Related to: Altered GI function  As Evidenced by: Patient report      Nutrition Prescription: Recommend the following diet  Small frequent meals -soft as tolerated  Include 2 protein shakes per day     Interventions and Teaching   Patient educated on post-op nutrition guidelines.       Patient educated and handouts provided  .Surgical changes to stomach / GI  Capacity of post-surgery stomach  Diet progression  Adequate hydration  Sugar and fat restriction to decrease \"dumping syndrome\"  Weight loss plateaus/ possibility of weight regain  Nutrition considerations after surgery  Protein supplements  Possible problems with poor eating habits  Potential for food intolerance after surgery, and ways to deal with them including: lactose intolerance, nausea, reflux, vomiting, diarrhea, food intolerance, appetite changes, gas  Vitamin / Mineral supplementation of Multivitamin with minerals and Calcium    Education provided to: patient    Barriers to learning: No barriers identified    Readiness to change: action    Comprehension: verbalizes understanding     Expected Compliance: good    Recommendations  Pt is an appropriate candidate for surgery. Yes    Evaluation/Monitoring   Eating pattern as discussed Tolerance of nutrition prescription Body weight Lab values Physical activity Bowel pattern    Goals  Eliminate sugar sweetened beverages, Food journal, Exercise 30 minutes 5 times per week, Complete lession plans 1-6, Eat 3 meals per day, and Eliminate mindless snacking    Time Spent:   60 Minutes   "

## 2025-01-29 DIAGNOSIS — E11.40 TYPE 2 DIABETES MELLITUS WITH DIABETIC NEUROPATHY, WITHOUT LONG-TERM CURRENT USE OF INSULIN (HCC): ICD-10-CM

## 2025-01-29 DIAGNOSIS — J44.9 CHRONIC OBSTRUCTIVE PULMONARY DISEASE, UNSPECIFIED COPD TYPE (HCC): ICD-10-CM

## 2025-01-29 DIAGNOSIS — I21.3 STEMI (ST ELEVATION MYOCARDIAL INFARCTION) (HCC): ICD-10-CM

## 2025-01-29 DIAGNOSIS — I25.10 CORONARY ARTERY DISEASE INVOLVING NATIVE CORONARY ARTERY OF NATIVE HEART WITHOUT ANGINA PECTORIS: ICD-10-CM

## 2025-01-29 DIAGNOSIS — F33.1 MAJOR DEPRESSIVE DISORDER, RECURRENT, MODERATE (HCC): ICD-10-CM

## 2025-01-30 ENCOUNTER — OFFICE VISIT (OUTPATIENT)
Dept: URGENT CARE | Facility: CLINIC | Age: 65
End: 2025-01-30
Payer: COMMERCIAL

## 2025-01-30 VITALS
SYSTOLIC BLOOD PRESSURE: 134 MMHG | BODY MASS INDEX: 39.42 KG/M2 | DIASTOLIC BLOOD PRESSURE: 88 MMHG | WEIGHT: 291 LBS | TEMPERATURE: 99.4 F | HEART RATE: 92 BPM | HEIGHT: 72 IN | OXYGEN SATURATION: 97 % | RESPIRATION RATE: 18 BRPM

## 2025-01-30 DIAGNOSIS — R50.9 FEVER, UNSPECIFIED FEVER CAUSE: ICD-10-CM

## 2025-01-30 DIAGNOSIS — R68.89 FLU-LIKE SYMPTOMS: Primary | ICD-10-CM

## 2025-01-30 LAB
S PYO AG THROAT QL: NEGATIVE
SARS-COV-2 AG UPPER RESP QL IA: NEGATIVE
VALID CONTROL: NORMAL

## 2025-01-30 PROCEDURE — S9083 URGENT CARE CENTER GLOBAL: HCPCS

## 2025-01-30 PROCEDURE — G0463 HOSPITAL OUTPT CLINIC VISIT: HCPCS

## 2025-01-30 PROCEDURE — 99203 OFFICE O/P NEW LOW 30 MIN: CPT

## 2025-01-30 PROCEDURE — 87880 STREP A ASSAY W/OPTIC: CPT

## 2025-01-30 PROCEDURE — 87636 SARSCOV2 & INF A&B AMP PRB: CPT

## 2025-01-30 PROCEDURE — 87811 SARS-COV-2 COVID19 W/OPTIC: CPT

## 2025-01-30 RX ORDER — PROCHLORPERAZINE 25 MG/1
SUPPOSITORY RECTAL
Qty: 1 EACH | Refills: 0 | Status: SHIPPED | OUTPATIENT
Start: 2025-01-30

## 2025-01-30 RX ORDER — TRAZODONE HYDROCHLORIDE 50 MG/1
50 TABLET, FILM COATED ORAL 2 TIMES DAILY
Qty: 180 TABLET | Refills: 1 | Status: SHIPPED | OUTPATIENT
Start: 2025-01-30

## 2025-01-30 RX ORDER — LIDOCAINE 50 MG/G
1 PATCH TOPICAL DAILY
Qty: 30 PATCH | Refills: 0 | OUTPATIENT
Start: 2025-01-30

## 2025-01-30 RX ORDER — BACLOFEN 10 MG/1
10 TABLET ORAL 3 TIMES DAILY
Qty: 90 TABLET | Refills: 0 | OUTPATIENT
Start: 2025-01-30

## 2025-01-30 RX ORDER — CLOPIDOGREL BISULFATE 75 MG/1
75 TABLET ORAL DAILY
Qty: 30 TABLET | Refills: 0 | Status: SHIPPED | OUTPATIENT
Start: 2025-01-30 | End: 2025-02-04 | Stop reason: SDUPTHER

## 2025-01-30 RX ORDER — OSELTAMIVIR PHOSPHATE 75 MG/1
75 CAPSULE ORAL EVERY 12 HOURS SCHEDULED
Qty: 10 CAPSULE | Refills: 0 | Status: SHIPPED | OUTPATIENT
Start: 2025-01-30 | End: 2025-02-04

## 2025-01-30 RX ORDER — FLUTICASONE PROPIONATE AND SALMETEROL 500; 50 UG/1; UG/1
1 POWDER RESPIRATORY (INHALATION) DAILY
Qty: 180 BLISTER | Refills: 1 | Status: SHIPPED | OUTPATIENT
Start: 2025-01-30

## 2025-01-30 RX ORDER — PROCHLORPERAZINE 25 MG/1
SUPPOSITORY RECTAL
Qty: 9 EACH | Refills: 1 | Status: SHIPPED | OUTPATIENT
Start: 2025-01-30

## 2025-01-30 RX ORDER — ASPIRIN 81 MG/1
81 TABLET, CHEWABLE ORAL DAILY
Qty: 30 TABLET | Refills: 0 | Status: SHIPPED | OUTPATIENT
Start: 2025-01-30 | End: 2025-02-04 | Stop reason: SDUPTHER

## 2025-01-30 NOTE — PROGRESS NOTES
[facility]        NAME: Aristeo Hall is a 64 y.o. male  : 1960    MRN: [id number]  DATE: 2025  TIME: 10:35 AM    Assessment and Plan   Flu-like symptoms [R68.89]  1. Flu-like symptoms  oseltamivir (TAMIFLU) 75 mg capsule      2. Fever, unspecified fever cause  Poct Covid 19 Rapid Antigen Test    POCT rapid ANTIGEN strepA            Patient Instructions     Rapid strep and Covid test negative.    You have a Covid/Flu PCR test pending. You can download [facility] for the results which take approximately 24-48 hours. You will be notified if positive.      For nasal/sinus congestion you can try steam, warm compresses, saline nasal spray, Neti pot, nasal steroid (Flonase, Nasocort), or nasal decongestant (Afrin - for 3 days only).    For cough you can take an over-the-counter expectorant such as plain Robitussion or Mucinex. A spoonful of honey at bedtime may also be helpful.    For cold symptoms with high blood pressure take Coricidin or SafeTussin cough/cold.     For sore throat you can use Cepacol lozenges, do warm salt water gargles, drink warm water with lemon or herbal teas, or use an over-the-counter throat spray (Chloraseptic).    You can take ibuprofen/Motrin and acetaminophen/Tylenol as needed for pain, fever, body aches. Do not take ibuprofen/Motrin/Advil if you have a history of heart disease, bleeding ulcers, or if you take blood thinners.     Drink plenty of fluids to stay hydrated. Airborne or Emergen-C for extra vitamin C and zinc.    Follow up with your PCP in 3-5 days for persistent symptoms.    Go to the ER if symptoms worsen.     If tests are performed, our office will contact you with results only if changes need to made to the care plan discussed with you at the visit. You can [facility] your full [facility]      Chief Complaint     Chief Complaint   Patient presents with    Cold Like Symptoms     Pt reports chills, night sweats,  sore throat, h/a and body aches with onset of symptoms Monday. States fever 101.0 F-102.0 F last night. Not currently managing with otc medication.          History of Present Illness       64-year-old male presenting with flulike symptoms x 2 - 3 days.  Patient reports fevers Tmax 102, chills, body aches, headache, sore throat, congestion, and cough.  Feels nauseous, no vomiting or diarrhea.  Not taking any OTC medications for symptoms.        Review of Systems   Review of Systems   Constitutional:  Positive for chills, diaphoresis, fatigue and fever.   HENT:  Positive for congestion and sore throat. Negative for ear pain.    Eyes:  Negative for discharge and redness.   Respiratory:  Positive for cough. Negative for shortness of breath and wheezing.    Cardiovascular:  Negative for chest pain and palpitations.   Gastrointestinal:  Positive for nausea. Negative for abdominal pain, diarrhea and vomiting.   Skin:  Negative for rash.   Neurological:  Positive for headaches. Negative for dizziness and light-headedness.         Current Medications       Current Outpatient Medications:     albuterol (2.5 mg/3 mL) 0.083 % nebulizer solution, USE 1 VIAL IN NEBULIZER EVERY 6 HOURS AS NEEDED FOR WHEEZING OR SHORTNESS OF BREATH, Disp: 720 mL, Rfl: 2    amLODIPine (NORVASC) 5 mg tablet, Take 1 tablet (5 mg total) by mouth daily, Disp: 90 tablet, Rfl: 0    ammonium lactate (LAC-HYDRIN) 12 % cream, APPLY  CREAM TOPICALLY TO AFFECTED AREA TWICE DAILY, Disp: 385 g, Rfl: 5    aspirin 81 mg chewable tablet, Chew 1 tablet (81 mg total) daily, Disp: 30 tablet, Rfl: 0    atorvastatin (LIPITOR) 80 mg tablet, Take 1 tablet (80 mg total) by mouth daily after dinner, Disp: 90 tablet, Rfl: 1    baclofen 10 mg tablet, Take 10 mg by mouth 3 (three) times a day, Disp: , Rfl:     busPIRone (BUSPAR) 5 mg tablet, Take 1 tablet (5 mg total) by mouth 2 (two) times a day, Disp: 180 tablet, Rfl: 1    CALCIUM PO, Take 1 tablet by mouth daily, Disp: ,  Rfl:     carvedilol (COREG) 12.5 mg tablet, Take 1 tablet by mouth twice daily with food, Disp: 60 tablet, Rfl: 0    clopidogrel (PLAVIX) 75 mg tablet, Take 1 tablet (75 mg total) by mouth daily, Disp: 30 tablet, Rfl: 0    Continuous Glucose Sensor (Dexcom G6 Sensor) MISC, 3 PACK SENSOR FOR CONTINUOUS GLUCOSE MONITORING, Disp: 9 each, Rfl: 1    docusate sodium (COLACE) 100 mg capsule, Take 1 capsule (100 mg total) by mouth 2 (two) times a day, Disp: 180 capsule, Rfl: 3    DULoxetine (Cymbalta) 30 mg delayed release capsule, Take 1 capsule (30 mg total) by mouth daily, Disp: 30 capsule, Rfl: 3    Empagliflozin (JARDIANCE) 10 MG TABS tablet, Take 1 tablet (10 mg total) by mouth every morning, Disp: 90 tablet, Rfl: 1    ergocalciferol (VITAMIN D2) 50,000 units, Take 1 capsule (50,000 Units total) by mouth 2 (two) times a week, Disp: 24 capsule, Rfl: 0    folic acid (FOLVITE) 1 mg tablet, Take 1 tablet by mouth once daily, Disp: 90 tablet, Rfl: 1    furosemide (LASIX) 40 mg tablet, Take once in AM daily. Take once daily in PM PRN weight gain, edema., Disp: 180 tablet, Rfl: 3    gabapentin (NEURONTIN) 300 mg capsule, Take 1 capsule (300 mg total) by mouth daily as needed (headache), Disp: 30 capsule, Rfl: 5    gabapentin (NEURONTIN) 800 mg tablet, Take 1 tablet (800 mg total) by mouth 4 (four) times a day, Disp: 360 tablet, Rfl: 1    hydrocortisone 1 % lotion, Apply topically if needed for rash, Disp: 59 mL, Rfl: 1    lidocaine (LIDODERM) 5 %, Apply 1 patch topically daily back, Disp: , Rfl:     losartan (COZAAR) 50 mg tablet, Take 1 tablet (50 mg total) by mouth daily, Disp: 100 tablet, Rfl: 1    methocarbamol (ROBAXIN) 750 mg tablet, TAKE 1 TABLET (750 MG TOTAL) BY MOUTH EVERY 6 (SIX) HOURS AS NEEDED FOR MUSCLE SPASMS, Disp: 120 tablet, Rfl: 0    naloxegol oxalate (MOVANTIK) 25 MG tablet, Take 1 tablet (25 mg total) by mouth daily in the early morning, Disp: 30 tablet, Rfl: 5    nitroglycerin (NITROSTAT) 0.4 mg SL  tablet, DISSOLVE ONE TABLET UNDER THE TONGUE EVERY 5 MINUTES AS NEEDED FOR CHEST PAIN.  DO NOT EXCEED A TOTAL OF 3 DOSES IN 15 MINUTES, Disp: 25 tablet, Rfl: 0    nystatin (MYCOSTATIN) cream, Apply 2 g (1 Application total) topically 2 (two) times a day, Disp: 15 g, Rfl: 0    oseltamivir (TAMIFLU) 75 mg capsule, Take 1 capsule (75 mg total) by mouth every 12 (twelve) hours for 5 days, Disp: 10 capsule, Rfl: 0    potassium chloride (Klor-Con M20) 20 mEq tablet, Take 1 tablet (20 mEq total) by mouth daily, Disp: 90 tablet, Rfl: 1    ranolazine (RANEXA) 1000 MG SR tablet, TAKE 1 TABLET (1,000 MG TOTAL) BY MOUTH EVERY 12 HOURS, Disp: 60 tablet, Rfl: 3    tamsulosin (FLOMAX) 0.4 mg, TAKE 1 CAPSULE BY MOUTH EVERY DAY WITH DINNER, Disp: 90 capsule, Rfl: 1    Tirzepatide (Mounjaro) 7.5 MG/0.5ML SOAJ, Once a week, Disp: 2 mL, Rfl: 6    topiramate (Topamax) 25 mg tablet, Take 1 tablet (25 mg total) by mouth 2 (two) times a day Take 1 tablet once a day for 2 weeks. Then take 1 tablet in the morning and one tablet in the evening., Disp: 60 tablet, Rfl: 5    traZODone (DESYREL) 50 mg tablet, Take 1 tablet by mouth twice daily, Disp: 180 tablet, Rfl: 1    vitamin B-12 (VITAMIN B-12) 1,000 mcg tablet, Take 1 tablet (1,000 mcg total) by mouth daily, Disp: 90 tablet, Rfl: 3    Continuous Glucose Transmitter (Dexcom G6 Transmitter) MISC, 1 TRANSMITTED EVERY 3 MONTHS FOR CONTINUOUS GLUCOSE MONITORING, Disp: 1 each, Rfl: 0    Fluticasone-Salmeterol (Advair) 500-50 mcg/dose inhaler, Inhale 1 puff daily Rinse mouth after use, Disp: 180 blister, Rfl: 1    Current Allergies     Allergies as of 01/30/2025    (No Known Allergies)            The following portions of the patient's history were reviewed and updated as appropriate: allergies, current medications, past family history, past medical history, past social history, past surgical history and problem list.     Past Medical History:   Diagnosis Date    Acute on chronic diastolic  congestive heart failure (HCC)     Altered gait     Alzheimer disease ([facility])     per patients,,early onset     Angina pectoris ([facility])     Anxiety     Arthritis     Brain aneurysm     coils placed    Cardiac disease     Chest pain 01/13/2016    Chronic kidney disease     Chronic pain     back/ right groin and rle- has morphine pump    Constipation     COPD (chronic obstructive pulmonary disease) ([facility])     Coronary artery disease     Decubital ulcer     sacral decub-occured 5/2019-sees wound care/debide in OR today 6/6/2019    Dependent on walker for ambulation     w/c for long distance    Depression     Diabetes mellitus ([facility])     insulin dependent -- pt states no longer dependent on insulin    Difficulty walking     Dizziness     occ    Dysphagia     Enlarged prostate     Esophageal stricture In file    Esophageal varices ([facility])     Fall     Fall at home 05/03/2019    GERD (gastroesophageal reflux disease)     Heart failure ([facility])     Hiatal hernia     Hx of gastric bypass 11/19/2018    states Nov 2019    Hypercholesterolemia     Hypertension     Ingrown toenail     MI (myocardial infarction) ([facility])     2017- stents x2    Migraine     Morbid obesity ([facility])     gastric bypass sleeve 11/2018-wt loss 125 lb    Myocardial infarction ([facility]) 2003    Neuropathy     Obesity 2003    Oxygen dependent     Q HS  2LPM with CPAP and prn during day 2-3 LPM  ( pt states no longer needs oxygen )    Pressure injury of skin     Pressure injury of skin of sacral region 06/03/2019    Added automatically from request for surgery [id number]    Renal disorder     Shortness of breath     Skin abnormality     sacral wound - covered with pad    Sleep apnea     couldnt tolerate CPAP    Stented coronary artery     Stroke ([facility])     vision loss b/l  2005, residual R leg weakness    Type 2 diabetes mellitus with diabetic neuropathy, with long-term current use of insulin ([facility]) 10/18/2019    Type 2 diabetes mellitus with renal complication ([facility])     insulin  dependent    Type 2 diabetes mellitus, with long-term current use of insulin ([facility]) 10/11/2017    Transitioned From: Diabetes mellitus type 2, uncontrolled    Urinary frequency     Use of cane as ambulatory aid     Wears dentures     Wears glasses     Wears glasses     Wheelchair dependent     states uses walker & cane       Past Surgical History:   Procedure Laterality Date    BACK SURGERY      BRAIN SURGERY      CARDIAC CATHETERIZATION      with stents    CARDIAC CATHETERIZATION N/A 8/18/2023    Procedure: Cardiac Coronary Angiogram;  Surgeon: Horacio Weiner MD;  Location: [facility];  Service: Cardiology    CEREBRAL ANEURYSM REPAIR      with coils    COLONOSCOPY      ESOPHAGOGASTRODUODENOSCOPY N/A 7/1/2016    Procedure: ESOPHAGOGASTRODUODENOSCOPY (EGD);  Surgeon: Reno Christiansen MD;  Location: [facility];  Service:     GASTRIC BYPASS  11/19/2018    HERNIA REPAIR      HIATAL HERNIA REPAIR      INFUSION PUMP IMPLANTATION Left     morphine    INTRATHECAL PUMP IMPLANTATION Left 7/9/2020    Procedure: REVISION INTRATHECAL PAIN PUMP POCKET, LEFT ABDOMEN;  Surgeon: Homero Cho MD;  Location: [facility];  Service: Neurosurgery    KNEE ARTHROSCOPY Right     KNEE ARTHROSCOPY Right     PERONEAL NERVE DECOMPRESSION Right     IN DEBRIDEMENT OPEN WOUND FIRST 20 SQ CM/< N/A 6/6/2019    Procedure: EXCISIONAL DEBRIDEMENT OF SACRAL DECUBITUS ULCER;  Surgeon: Siddhartha Omer MD;  Location: [facility];  Service: General    IN ESOPHAGOGASTRODUODENOSCOPY TRANSORAL DIAGNOSTIC N/A 2/27/2017    Procedure: ESOPHAGOGASTRODUODENOSCOPY (EGD);  Surgeon: Reno Christiansen MD;  Location: [facility];  Service: Gastroenterology    IN ESOPHAGOGASTRODUODENOSCOPY TRANSORAL DIAGNOSTIC N/A 8/23/2018    Procedure: ESOPHAGOGASTRODUODENOSCOPY (EGD) with biopsy;  Surgeon: Reno Christiansen MD;  [facility]: [facility];  Service: Gastroenterology    IN IMPLTJ/RPLCMT ITHCL/EDRL DRUG NFS PRGRBL PUMP Left 10/13/2020    Procedure: EXPLORATION OF INTRATHECAL PAIN PUMP  SYSTEM INTEGRITY FOR POSSIBLE REPLACEMENT OF CATHETER AND PUMP.;  Surgeon: Homero Cho MD;  Location: [facility];  Service: Neurosurgery    AK IMPLTJ/RPLCMT ITHCL/EDRL DRUG NFS SUBQ RSVR N/A 1/19/2017    Procedure: REMOVAL / EXCHANGE INTRATHECAL PAIN PUMP;  Surgeon: Que Leonard MD;  [facility]: [facility];  Service: Orthopedics    AK IMPLTJ/RPLCMT ITHCL/EDRL DRUG NFS SUBQ RSVR N/A 5/16/2016    Procedure: REPLACEMENT AND PROGRAM PUMP ;  Surgeon: Que Leonard MD;  [facility];  Service: Orthopedics    AK LAPS RPR PARAESPHGL HRNA INCL FUNDPLSTY W/MESH N/A 7/23/2024    Procedure: REPAIR HERNIA PARAESOPHAGEAL  W ROBOTICS & MAGNETIC SPINCTER AUGMENTATION DEVISE;  Surgeon: Mansi Mauro MD;  [facility];  Service: Bariatrics    AK PRQ IMPLTJ NSTIM ELECTRODE ARRAY EPIDURAL Right 3/17/2021    Procedure: INSERTION THORACIC DORSAL COLUMN SPINAL CORD STIMULATOR PERCUTANEOUS W IMPLANTABLE PULSE GENERATOR, RIGHT;  Surgeon: Homero Cho MD;  Location: [facility];  Service: Neurosurgery       Family History   Problem Relation Age of Onset    Diabetes unspecified Mother     Diabetes Mother     Heart attack Father     Heart disease Father     Hypertension Father     Stroke Father     Diabetes unspecified Brother     Depression Brother     Mental illness Brother     COPD Brother     Drug abuse Brother     Bipolar disorder Brother     Diabetes unspecified Brother     Diabetes unspecified Maternal Grandmother     Diabetes unspecified Paternal Grandmother     Diabetes unspecified Paternal Uncle     ADD / ADHD Cousin     Colon cancer Neg Hx     Colon polyps Neg Hx          Medications have been verified.        Objective   /88 (BP Location: Right arm, Patient Position: Sitting, Cuff Size: Standard)   Pulse 92   Temp 99.4 °F (37.4 °C) (Tympanic)   Resp 18   Ht 6' (1.829 m)   Wt 132 kg (291 lb)   SpO2 97%   BMI 39.47 kg/m²        Physical Exam     Physical Exam  Vitals and nursing note reviewed.    Constitutional:       General: He is not in acute distress.     Appearance: He is not ill-appearing or diaphoretic.   HENT:      Head: Normocephalic and atraumatic.      Right Ear: Tympanic membrane, ear canal and external ear normal.      Left Ear: Tympanic membrane, ear canal and external ear normal.      Nose: Congestion present.      Mouth/Throat:      Mouth: Mucous membranes are moist.      Pharynx: Oropharynx is clear. Posterior oropharyngeal erythema present. No oropharyngeal exudate.   Eyes:      Conjunctiva/sclera: Conjunctivae normal.      Pupils: Pupils are equal, round, and reactive to light.   Cardiovascular:      Rate and Rhythm: Normal rate and regular rhythm.      Pulses: Normal pulses.      Heart sounds: Normal heart sounds.   Pulmonary:      Effort: Pulmonary effort is normal.      Breath sounds: Normal breath sounds.   Musculoskeletal:         General: Normal range of motion.      Cervical back: Normal range of motion and neck supple.   Skin:     General: Skin is warm and dry.      Capillary Refill: Capillary refill takes less than 2 seconds.   Neurological:      Mental Status: He is alert and oriented to person, place, and time.      Gait: Gait abnormal (using walker).

## 2025-01-30 NOTE — TELEPHONE ENCOUNTER
Requested medication(s) are due for refill today: No  Patient has already received a courtesy refill: No  Other reason request has been forwarded to provider: doesn't look like this is prescribed by you, please advise, thank you

## 2025-01-30 NOTE — PATIENT INSTRUCTIONS
Rapid strep and Covid test negative.    You have a Covid/Flu PCR test pending. You can download [employer] for the results which take approximately 24-48 hours. You will be notified if positive.      For nasal/sinus congestion you can try steam, warm compresses, saline nasal spray, Neti pot, nasal steroid (Flonase, Nasocort), or nasal decongestant (Afrin - for 3 days only).    For cough you can take an over-the-counter expectorant such as plain Robitussion or Mucinex. A spoonful of honey at bedtime may also be helpful.    For cold symptoms with high blood pressure take Coricidin or SafeTussin cough/cold.     For sore throat you can use Cepacol lozenges, do warm salt water gargles, drink warm water with lemon or herbal teas, or use an over-the-counter throat spray (Chloraseptic).    You can take ibuprofen/Motrin and acetaminophen/Tylenol as needed for pain, fever, body aches. Do not take ibuprofen/Motrin/Advil if you have a history of heart disease, bleeding ulcers, or if you take blood thinners.     Drink plenty of fluids to stay hydrated. Airborne or Emergen-C for extra vitamin C and zinc.    Follow up with your PCP in 3-5 days for persistent symptoms.    Go to the ER if symptoms worsen.

## 2025-01-31 LAB
FLUAV RNA RESP QL NAA+PROBE: NEGATIVE
FLUBV RNA RESP QL NAA+PROBE: NEGATIVE
SARS-COV-2 RNA RESP QL NAA+PROBE: NEGATIVE

## 2025-02-01 ENCOUNTER — RESULTS FOLLOW-UP (OUTPATIENT)
Dept: URGENT CARE | Facility: CLINIC | Age: 65
End: 2025-02-01

## 2025-02-01 NOTE — TELEPHONE ENCOUNTER
COVID 19- Negative.    Flu A and Flu B- negative.     Patient is aware and was told if symptoms are getting worst recommend evaluation.

## 2025-02-04 ENCOUNTER — OFFICE VISIT (OUTPATIENT)
Dept: CARDIOLOGY CLINIC | Facility: CLINIC | Age: 65
End: 2025-02-04
Payer: COMMERCIAL

## 2025-02-04 VITALS
DIASTOLIC BLOOD PRESSURE: 80 MMHG | SYSTOLIC BLOOD PRESSURE: 138 MMHG | HEART RATE: 86 BPM | BODY MASS INDEX: 38.57 KG/M2 | WEIGHT: 291 LBS | HEIGHT: 73 IN

## 2025-02-04 DIAGNOSIS — K44.9 HIATAL HERNIA: ICD-10-CM

## 2025-02-04 DIAGNOSIS — Z98.84 BARIATRIC SURGERY STATUS: ICD-10-CM

## 2025-02-04 DIAGNOSIS — I50.32 CHRONIC DIASTOLIC CONGESTIVE HEART FAILURE (HCC): Primary | ICD-10-CM

## 2025-02-04 DIAGNOSIS — I25.118 CORONARY ARTERY DISEASE OF NATIVE ARTERY OF NATIVE HEART WITH STABLE ANGINA PECTORIS (HCC): ICD-10-CM

## 2025-02-04 DIAGNOSIS — R07.9 CHEST PAIN, UNSPECIFIED TYPE: ICD-10-CM

## 2025-02-04 DIAGNOSIS — N18.2 STAGE 2 CHRONIC KIDNEY DISEASE: ICD-10-CM

## 2025-02-04 PROCEDURE — G2211 COMPLEX E/M VISIT ADD ON: HCPCS | Performed by: INTERNAL MEDICINE

## 2025-02-04 PROCEDURE — 99215 OFFICE O/P EST HI 40 MIN: CPT | Performed by: INTERNAL MEDICINE

## 2025-02-04 RX ORDER — ATORVASTATIN CALCIUM 80 MG/1
80 TABLET, FILM COATED ORAL
Qty: 90 TABLET | Refills: 3 | Status: SHIPPED | OUTPATIENT
Start: 2025-02-04

## 2025-02-04 RX ORDER — RANOLAZINE 1000 MG/1
1000 TABLET, EXTENDED RELEASE ORAL 2 TIMES DAILY
Qty: 180 TABLET | Refills: 3 | Status: SHIPPED | OUTPATIENT
Start: 2025-02-04

## 2025-02-04 RX ORDER — FUROSEMIDE 40 MG/1
TABLET ORAL
Qty: 180 TABLET | Refills: 3 | Status: SHIPPED | OUTPATIENT
Start: 2025-02-04

## 2025-02-04 RX ORDER — CLOPIDOGREL BISULFATE 75 MG/1
75 TABLET ORAL DAILY
Qty: 90 TABLET | Refills: 3 | Status: SHIPPED | OUTPATIENT
Start: 2025-02-04

## 2025-02-04 RX ORDER — ASPIRIN 81 MG/1
81 TABLET, CHEWABLE ORAL DAILY
Qty: 90 TABLET | Refills: 3 | Status: SHIPPED | OUTPATIENT
Start: 2025-02-04

## 2025-02-04 RX ORDER — SPIRONOLACTONE 25 MG/1
25 TABLET ORAL DAILY
Qty: 90 TABLET | Refills: 3 | Status: SHIPPED | OUTPATIENT
Start: 2025-02-04

## 2025-02-04 RX ORDER — LOSARTAN POTASSIUM 50 MG/1
50 TABLET ORAL DAILY
Qty: 100 TABLET | Refills: 3 | Status: SHIPPED | OUTPATIENT
Start: 2025-02-04

## 2025-02-04 RX ORDER — CARVEDILOL 25 MG/1
25 TABLET ORAL 2 TIMES DAILY WITH MEALS
Qty: 180 TABLET | Refills: 3 | Status: SHIPPED | OUTPATIENT
Start: 2025-02-04

## 2025-02-04 NOTE — PROGRESS NOTES
Cardiology Outpatient Follow-Up Note - Aristeo Hall 64 y.o. male MRN: [id number]      Assessment/Plan:    1. Hiatal hernia  - Ambulatory referral to Cardiology    2. Bariatric surgery status  - Ambulatory referral to Cardiology    3. Chronic diastolic congestive heart failure (HCC) (Primary)  He has some symptoms of congestion today  Has been feeling more dyspneic, now with orthopnea.   Continue current dose of lasix 40 mg daily  Continue Jardiance 10 mg daily  Add spironolactone 25 mg daily; stop potassium chloride  Check BMP and BNP in 2 weeks    Follow-up in 4 weeks. If persistent symptoms may need to increase lasix to 40 mg BID as standing regimen    - carvedilol (COREG) 25 mg tablet; Take 1 tablet (25 mg total) by mouth 2 (two) times a day with meals  Dispense: 180 tablet; Refill: 3  - furosemide (LASIX) 40 mg tablet; Take once in AM daily. Take once daily in PM PRN weight gain, edema.  Dispense: 180 tablet; Refill: 3  - spironolactone (ALDACTONE) 25 mg tablet; Take 1 tablet (25 mg total) by mouth daily  Dispense: 90 tablet; Refill: 3  - Basic metabolic panel; Future  - B-Type Natriuretic Peptide(BNP); Future    4. Coronary artery disease of native artery of native heart with stable angina pectoris (HCC)  Increase carvedilol to 25 mg BID  Continue ranolazine 1000 mg BID - he has been running out of this and taking it once daily instead of twice, which may explain some of his recurrent symptoms.   Continue DAPT and statin  Stop amlodipine    - carvedilol (COREG) 25 mg tablet; Take 1 tablet (25 mg total) by mouth 2 (two) times a day with meals  Dispense: 180 tablet; Refill: 3  - aspirin 81 mg chewable tablet; Chew 1 tablet (81 mg total) daily  Dispense: 90 tablet; Refill: 3  - atorvastatin (LIPITOR) 80 mg tablet; Take 1 tablet (80 mg total) by mouth daily after dinner  Dispense: 90 tablet; Refill: 3  - clopidogrel (PLAVIX) 75 mg tablet; Take 1 tablet (75 mg total) by mouth daily  Dispense: 90 tablet;  Refill: 3  - ranolazine (RANEXA) 1000 MG SR tablet; Take 1 tablet (1,000 mg total) by mouth 2 (two) times a day  Dispense: 180 tablet; Refill: 3    5. Stage 2 chronic kidney disease  - losartan (COZAAR) 50 mg tablet; Take 1 tablet (50 mg total) by mouth daily  Dispense: 100 tablet; Refill: 3    6. Chest pain, unspecified type  Check echo and stress  His current symptoms may be due to hypervolemia and/or running low on his antianginal medications, although he has previously had the propensity for significant recurrence of occlusive CAD requiring multiple PCI over the years.   - Echo complete w/ contrast if indicated; Future  - NM myocardial perfusion spect (rx stress and/or rest); Future        We will see Aristeo Hall back in 1-2 months for routine follow-up.    Subjective:     HPI: Aristeo Hall is a 64 y.o. year old male with long history of CAD and multiple PCI, myopericarditis in Aug 2023, T2DM, prior CVA, cerebral aneurysm s/p coiling, CKD2, chronic pain disorder s/p spinal cord stimulator, COPD, presenting today for follow-up.    He is having worsening dyspnea and chest pain.  He is having orthopnea and chest discomfort lying down and has to sit back down.   Has been sleeping in recliner because he cannot lay flat.       Cardiac Testing:    Echo 3/29/24    Interpretation Summary  Show Result Comparison     Left Ventricle: Left ventricular cavity size is normal. Wall thickness is mildly increased. The left ventricular ejection fraction is 60% by biplane measurement. Systolic function is normal. Although no diagnostic regional wall motion abnormality was identified, this possibility cannot be completely excluded on the basis of this study. Diastolic function is normal.    Right Ventricle: Right ventricular cavity size is dilated. Systolic function is normal.    Right Atrium: The atrium is dilated.    Atrial Septum: No patent foramen ovale detected, confirmed by provocation with cough,  using agitated saline contrast and with valsalva, using agitated saline contrast.    Aortic Valve: There is aortic valve sclerosis.    Aorta: The aortic root is normal in size. The ascending aorta is mildly dilated. The aortic root is 3.50 cm. The ascending aorta is 3.9 cm.          Cardiac MRI 8/31/23  Findings:  1. The left ventricle is normal in size with mildly increased wall thickness.  Left ventricular systolic function is normal with a measured ejection fraction of 58%.  There are no regional left ventricular wall motion abnormalities.  2. The right ventricle is normal in size with normal right ventricular systolic function.  3. The aortic, mitral, and tricuspid valves open without restriction.  There is no significant valvular regurgitation, however cine MRI is inaccurate in the qualitative assessment of valvular regurgitation.  4. The left atrium is normal in size. The aortic root is normal in size.  5. There is evidence of myocardial edema within the basal anteroseptal and basal inferior walls.  6. There is no obvious evidence of pericardial inflammation.  7. Delayed post-gadolinium imaging demonstrates patchy intramyocardial hyperenhancement of the basal anteroseptal and basal inferior walls.     IMPRESSION:  Impression:  1. Normal left and right ventricular size and systolic function.  2. No significant valvular abnormalities.  3. No obvious evidence of pericarinal inflammation.  4. Patchy intramyocardial fibrosis/edema involving the basal anteroseptal and basal inferior walls.  This is highly suggestive of acute myocarditis.       History of coronary artery disease  2010 per patient had his first MI with stenting of unknown vessel  2015 [facility]: Distal LAD 85%, proximal circumflex 80%, distal circumflex 90%, first obtuse marginal 80%, mid RCA 40%, distal RCA 40%, PCI/OMER x1 to proximal circumflex  2017 [facility]: Distal LAD 95%, proximal circumflex stent patent, mid RCA 85%, PCI/OMER x1 to mid RCA  2020 [facility]: LM  "normal, proximal LAD 85%, mid LAD 5-10%, circumflex 20% mid lesion, obtuse marginal branches luminal irregularities, proximal RCA 20%, distal RCA 30%, PCI/OMER x1 to proximal LAD  2022 [facility]: LM luminal irregularities, ostial LAD 30%, proximal LAD stent patent, mid LAD 80% stenosis, ostial to mid circumflex 20%, obtuse marginal branch with luminal irregularities, proximal RCA 20%, distal RCA 30%, RPDA 50%, PCI/OMER x1 in proximal to mid LAD  8/2023 [facility]: Distal left main 50%, ostial LAD 40%, mid LAD 40%, mid circumflex 20%, patent LAD stent, circumflex, and RCA stents, medical management recommended           EKGs, personally reviewed:  EKG 3/28/24 - NSR, 61 bpm, left axis, RBBB + LAFB ( bifascicular block), abnormal study.       Relevant Labs & Results:  BMP 1/5/24 - K 4.0, Cr 1.17, GFR 66  CBC 3/7/24 - Hgb 14.5 g/dL   HbA1c 1/9/24 - 6.2%  Lipid profile 8/19/23 - , , HDL 30, LDL 81 mg/dL (atorvastatin 40 mg daily)    CMP 1/22/2025--creatinine 1.11, potassium 3.8, sodium 140  Lipid panel 1/14/2024--, , HDL 27, LDL 94 mg/dL    ROS:  Review of Systems:  Review of Systems    Objective:     Vitals:   Vitals:    02/04/25 1102   BP: 138/80   BP Location: Left arm   Patient Position: Sitting   Cuff Size: Standard   Pulse: 86   Weight: 132 kg (291 lb)   Height: 6' 1\" (1.854 m)    Body surface area is 2.52 meters squared.  Wt Readings from Last 3 Encounters:   02/04/25 132 kg (291 lb)   01/30/25 132 kg (291 lb)   01/22/25 132 kg (291 lb 9.6 oz)       Physical Exam:  General: Aristeo Hall is an obese male, in no acute distress, sitting comfortably  HEENT: moist mucous membranes, EOMI  Neck:  Unable to assess JVD, supple, trachea midline  Cardiovascular: unremarkable S1/S2, regular rate and rhythm, no murmurs, rubs or gallops  Pulmonary: normal respiratory effort, faint basilar rales  Abdomen: soft and nondistended  Extremities: +1 lower extremity edema. Warm and well perfused " extremities.  Neuro: deferred  Psych: Normal mood and affect, cooperative      Medications (at the START of this encounter):  Outpatient Medications Prior to Visit   Medication Sig Dispense Refill    albuterol (2.5 mg/3 mL) 0.083 % nebulizer solution USE 1 VIAL IN NEBULIZER EVERY 6 HOURS AS NEEDED FOR WHEEZING OR SHORTNESS OF BREATH 720 mL 2    amLODIPine (NORVASC) 5 mg tablet Take 1 tablet (5 mg total) by mouth daily 90 tablet 0    ammonium lactate (LAC-HYDRIN) 12 % cream APPLY  CREAM TOPICALLY TO AFFECTED AREA TWICE DAILY 385 g 5    aspirin 81 mg chewable tablet Chew 1 tablet (81 mg total) daily 30 tablet 0    atorvastatin (LIPITOR) 80 mg tablet Take 1 tablet (80 mg total) by mouth daily after dinner 90 tablet 1    baclofen 10 mg tablet Take 10 mg by mouth 3 (three) times a day      busPIRone (BUSPAR) 5 mg tablet Take 1 tablet (5 mg total) by mouth 2 (two) times a day 180 tablet 1    CALCIUM PO Take 1 tablet by mouth daily      carvedilol (COREG) 12.5 mg tablet Take 1 tablet by mouth twice daily with food 60 tablet 0    clopidogrel (PLAVIX) 75 mg tablet Take 1 tablet (75 mg total) by mouth daily 30 tablet 0    Continuous Glucose Sensor (Dexcom G6 Sensor) MISC 3 PACK SENSOR FOR CONTINUOUS GLUCOSE MONITORING 9 each 1    Continuous Glucose Transmitter (Dexcom G6 Transmitter) MISC 1 TRANSMITTED EVERY 3 MONTHS FOR CONTINUOUS GLUCOSE MONITORING 1 each 0    docusate sodium (COLACE) 100 mg capsule Take 1 capsule (100 mg total) by mouth 2 (two) times a day 180 capsule 3    DULoxetine (Cymbalta) 30 mg delayed release capsule Take 1 capsule (30 mg total) by mouth daily 30 capsule 3    Empagliflozin (JARDIANCE) 10 MG TABS tablet Take 1 tablet (10 mg total) by mouth every morning 90 tablet 1    ergocalciferol (VITAMIN D2) 50,000 units Take 1 capsule (50,000 Units total) by mouth 2 (two) times a week 24 capsule 0    Fluticasone-Salmeterol (Advair) 500-50 mcg/dose inhaler Inhale 1 puff daily Rinse mouth after use 180 blister 1     folic acid (FOLVITE) 1 mg tablet Take 1 tablet by mouth once daily 90 tablet 1    furosemide (LASIX) 40 mg tablet Take once in AM daily. Take once daily in PM PRN weight gain, edema. 180 tablet 3    gabapentin (NEURONTIN) 300 mg capsule Take 1 capsule (300 mg total) by mouth daily as needed (headache) 30 capsule 5    gabapentin (NEURONTIN) 800 mg tablet Take 1 tablet (800 mg total) by mouth 4 (four) times a day 360 tablet 1    hydrocortisone 1 % lotion Apply topically if needed for rash 59 mL 1    lidocaine (LIDODERM) 5 % Apply 1 patch topically daily back      losartan (COZAAR) 50 mg tablet Take 1 tablet (50 mg total) by mouth daily 100 tablet 1    methocarbamol (ROBAXIN) 750 mg tablet TAKE 1 TABLET (750 MG TOTAL) BY MOUTH EVERY 6 (SIX) HOURS AS NEEDED FOR MUSCLE SPASMS 120 tablet 0    naloxegol oxalate (MOVANTIK) 25 MG tablet Take 1 tablet (25 mg total) by mouth daily in the early morning 30 tablet 5    nitroglycerin (NITROSTAT) 0.4 mg SL tablet DISSOLVE ONE TABLET UNDER THE TONGUE EVERY 5 MINUTES AS NEEDED FOR CHEST PAIN.  DO NOT EXCEED A TOTAL OF 3 DOSES IN 15 MINUTES 25 tablet 0    nystatin (MYCOSTATIN) cream Apply 2 g (1 Application total) topically 2 (two) times a day 15 g 0    oseltamivir (TAMIFLU) 75 mg capsule Take 1 capsule (75 mg total) by mouth every 12 (twelve) hours for 5 days 10 capsule 0    potassium chloride (Klor-Con M20) 20 mEq tablet Take 1 tablet (20 mEq total) by mouth daily 90 tablet 1    ranolazine (RANEXA) 1000 MG SR tablet TAKE 1 TABLET (1,000 MG TOTAL) BY MOUTH EVERY 12 HOURS 60 tablet 3    tamsulosin (FLOMAX) 0.4 mg TAKE 1 CAPSULE BY MOUTH EVERY DAY WITH DINNER 90 capsule 1    Tirzepatide (Mounjaro) 7.5 MG/0.5ML SOAJ Once a week 2 mL 6    topiramate (Topamax) 25 mg tablet Take 1 tablet (25 mg total) by mouth 2 (two) times a day Take 1 tablet once a day for 2 weeks. Then take 1 tablet in the morning and one tablet in the evening. 60 tablet 5    traZODone (DESYREL) 50 mg tablet Take 1  "tablet (50 mg total) by mouth 2 (two) times a day 180 tablet 1    vitamin B-12 (VITAMIN B-12) 1,000 mcg tablet Take 1 tablet (1,000 mcg total) by mouth daily 90 tablet 3     No facility-administered medications prior to visit.         This note was completed in part utilizing Dragon [employer] voice recognition software. Grammatical errors, random word insertion, spelling mistakes, occasional wrong word or \"sound-alike\" substitutions and incomplete sentences may be an occasional consequence of the system secondary to software limitations, ambient noise and hardware issues. At the time of dictation, efforts were made to edit, clarify and /or correct errors.  Please read the chart carefully and recognize, using context, where substitutions have occurred.  If you have any questions or concerns about the context, text or information contained within the body of this dictation, please contact myself, the provider, for further clarification.    "

## 2025-02-04 NOTE — PATIENT INSTRUCTIONS
I have increased your carvedilol to 25 mg twice daily  I have added a new med called spironolactone      I have stopped  your amlodipine and potassium       Please get labs in 2 weeks  Check echo and stress

## 2025-02-10 ENCOUNTER — OFFICE VISIT (OUTPATIENT)
Age: 65
End: 2025-02-10
Payer: COMMERCIAL

## 2025-02-10 VITALS
SYSTOLIC BLOOD PRESSURE: 142 MMHG | OXYGEN SATURATION: 97 % | DIASTOLIC BLOOD PRESSURE: 78 MMHG | HEART RATE: 108 BPM | WEIGHT: 290 LBS | BODY MASS INDEX: 39.28 KG/M2 | HEIGHT: 72 IN

## 2025-02-10 DIAGNOSIS — R94.2 DECREASED DIFFUSION CAPACITY: ICD-10-CM

## 2025-02-10 DIAGNOSIS — R06.02 SHORTNESS OF BREATH: Primary | ICD-10-CM

## 2025-02-10 DIAGNOSIS — J98.4 RESTRICTIVE LUNG DISEASE: ICD-10-CM

## 2025-02-10 DIAGNOSIS — J45.40 MODERATE PERSISTENT ASTHMA WITHOUT COMPLICATION: ICD-10-CM

## 2025-02-10 DIAGNOSIS — G47.33 OSA (OBSTRUCTIVE SLEEP APNEA): ICD-10-CM

## 2025-02-10 PROCEDURE — 99214 OFFICE O/P EST MOD 30 MIN: CPT | Performed by: PHYSICIAN ASSISTANT

## 2025-02-10 RX ORDER — ALBUTEROL SULFATE 90 UG/1
2 INHALANT RESPIRATORY (INHALATION) EVERY 6 HOURS PRN
Qty: 18 G | Refills: 3 | Status: SHIPPED | OUTPATIENT
Start: 2025-02-10

## 2025-02-10 RX ORDER — FLUTICASONE FUROATE, UMECLIDINIUM BROMIDE AND VILANTEROL TRIFENATATE 200; 62.5; 25 UG/1; UG/1; UG/1
1 POWDER RESPIRATORY (INHALATION) DAILY
Qty: 60 BLISTER | Refills: 3 | Status: SHIPPED | OUTPATIENT
Start: 2025-02-10 | End: 2025-03-12

## 2025-02-10 NOTE — PROGRESS NOTES
Name: Aristeo Hall      : 1960      MRN: [id number]  Encounter Provider: Charlotte Cole MD  Encounter Date: 2025   Encounter department: [facility]  :  Assessment & Plan  Neuropathic pain  On exam, he has components of both large and small fiber neuropathy.  The only clear underlying etiology is diabetes.  B12, B1, B6, Lyme, zinc and immunofixation were all unremarkable.    He tried Lyrica in the past which was also not helpful.  I am reluctant to try a tricyclic antidepressant because of borderline elevated QTc.  He felt the duloxetine was too sedating.  He has tried lidocaine ointment which caused more pain than improvement due to the pain from the actual application.  He has a spinal cord stimulator in place which has not been helpful.  He remains on gabapentin 2400 mg daily.  He has not noticed a benefit from topiramate 25 mg twice daily.  No kidney stones.  Alpha lipoic acid was not helpful and it was stopped.    Recommendations:  -Continue gabapentin 800 mg 4 times daily.  Advised to take it as prescribed.  -Increase topiramate.  He will take 25 mg in the morning and 50 mg for the next week, followed by 50 mg twice daily. We will have to follow cognitive function but he should not have an issue at lower doses.    -We provided a new prescription for wheelchair.  His wheelchair broke.  -Compression socks      Orders:  •  Wheelchair  •  topiramate (Topamax) 50 MG tablet; Take 1 tablet (50 mg total) by mouth every 12 (twelve) hours    Diabetic neuropathy (HCC)    Lab Results   Component Value Date    HGBA1C 6.3 (H) 2024     Orders:  •  Wheelchair  •  gabapentin (NEURONTIN) 800 mg tablet; Take 1 tablet (800 mg total) by mouth 4 (four) times a day    Chronic migraine without aura without status migrainosus, not intractable  He has combination of chronic daily headache and chronic migraine.  I have increased topiramate to 50 mg twice daily to help with  the neuropathy and hopefully help with the headaches as well.    I will refer him to the Botox clinic.    Orders:  •  topiramate (Topamax) 50 MG tablet; Take 1 tablet (50 mg total) by mouth every 12 (twelve) hours  •  gabapentin (NEURONTIN) 300 mg capsule; Take 1 capsule (300 mg total) by mouth daily as needed (headache)    Chronic daily headache          History of CVA (cerebrovascular accident)  He has a history of cerebellar and occipital strokes.  He does have a field cut.  He is on aspirin and Plavix.    Presence of intrathecal pump  He had previous injury to the femoral nerve in 2004.  He has a morphine pump and is followed by pain management.    CPAP (continuous positive airway pressure) dependence  He has ARLEEN and was recently started on CPAP.    Cerebral aneurysm  He had previous intervention in 2004 and is followed by neurosurgery.        Follow-up in 4 months or sooner if difficulties.                History of Present Illness   This is a  64 yr old gentleman with who presents for follow up of neuropathic pain in the lower extremities. Last seen by me in Oct 2024. He has also followed with Dr Qiu, although not recently.     Neuropathic pain/ diabetic neuropathy:  His original symptoms started years ago after gastric sleeve surgery done in 2019.  Symptoms started in the feet and gradually progressed up the legs.  Symptoms are now at the level of the knees.  He does have hand weakness.  A spinal cord stimulator was tried but he did not have significant relief for the neuropathic pain.  He takes gabapentin 800 mg 4 times daily without relief.       He tried Lyrica in the past which did not help.  Dr. Qiu tried him on topical analgesics but he did not find them helpful. He tried duloxetine 30 mg daily, started at last visit. It made him too groggy. He has not been taking it daily. He has a medical marijuana card, but does not use it because he cannot tolerate it.  He tried alpha lipoic acid for 2 months.  " It was not helpful.  At last visit, I started him on topiramate and titrated to a dose of 25 mg twice daily.  It has not helped. \"Gabapentin is the only thing\" that makes him feel better. He is taking 800 mg in AM and 2400 mg at night. He knows it is an excessive dose to take at night. He cannot sleep without that night time dose.      He has never had an EMG.    He continues to have intense pain. He has pain in the feet extending to the knees. It is burning and aching.  The right leg is numb to the thigh and the left up to the knee. He was using a wheelchair due to the pain until the wheelchair broke on the ice a few days ago.    He cannot feel hot water on his feet because they are so numb.     He is interested in trying compression socks.         Cerebral aneurysm:  In 2004 he had a cerebral angiogram for aneurysm.  He tells us that the femoral nerve was injured at the time.  He has had pain in his leg and weakness of the proximal right leg ever since.  He has a morphine pump in place and follows closely with pain management for the last 20+ years.  He was initially wheelchair-bound but progressed to a rollator which she has been using for about 20 years.  He notes chronic memory issues ever since the aneurysm surgery.  Last visit, he told me he is on memantine but is not listed on his medication list.        Chronic strokes:  He has a history of chronic strokes and difficulty with field cuts superiorly in both eyes.  He is able to drive.        Chronic migraine and chronic daily headaches:  He has a history of chronic migraine for which she was treated with Botox.  He did find it helpful.  He has chronic daily headache as well. He is on gabapentin 2400 mg daily for the neuropathy, with additional 300 mg daily as needed for the headaches.  He describes his headache as a pressure-like pain in a band formation.  They can be associated with photophobia and occasional blurred vision.  No nausea or vomiting. He is " getting 4 headaches per week. They last 2-3 hours at a time. He takes excedrin prn. He takes gabapentin 300 mg extra when he has a migraine.           ARLEEN:  He was started on CPAP, but had multiple nighttime awakenings due to pain and so he was taking his mask on and off.  He is being evaluated for the inspire device due to intolerance to the CPAP mask.        Other:  He attempted Cologuard recently but was unable to complete it.  Per GI notes, it is due to be rescheduled but the patient does not think he would be able to do it.    He is awaiting a nuclear stress test.          PMH: bilateral occipital and left cerebellar lobe infarcts, right ACOM aneurysm, chronic migraine headaches, COPD, chronic pain disorder, CAD,  esophageal dysphagia, GERD, hiatal hernia,  back pain s/p intrathecal pump, lumbosacral radiculopathy, DM, CKD   PSH: gastric sleeve surgery, intrathecal pump        Review of Systems I have personally reviewed the MA's review of systems and made changes as necessary.    Constitutional:  Negative for appetite change, fatigue and fever.   HENT: Negative.  Negative for hearing loss, tinnitus, trouble swallowing and voice change.    Eyes: Negative.  Negative for photophobia, pain and visual disturbance.   Respiratory: Negative.  Negative for shortness of breath.    Cardiovascular: Negative.  Negative for palpitations.   Gastrointestinal: Negative.  Negative for nausea and vomiting.   Endocrine: Positive for cold intolerance.   Genitourinary: Negative.  Negative for dysuria, frequency and urgency.   Musculoskeletal:  Positive for gait problem. Negative for back pain, myalgias, neck pain and neck stiffness.   Skin: Negative.  Negative for rash.   Allergic/Immunologic: Negative.    Neurological:  Positive for numbness. Negative for dizziness, tremors, seizures, syncope, facial asymmetry, speech difficulty, weakness, light-headedness and headaches.   Hematological: Negative.  Does not bruise/bleed easily.    Psychiatric/Behavioral: Negative.  Negative for confusion, hallucinations and sleep disturbance.         Objective   /84 (BP Location: Right arm, Patient Position: Sitting, Cuff Size: Adult)   Pulse 96   Temp (!) 97.2 °F (36.2 °C) (Temporal)   Resp 16   Ht 6' (1.829 m)   Wt 132 kg (290 lb)   SpO2 97%   BMI 39.33 kg/m²     Physical Exam  Constitutional:       Appearance: Normal appearance.   HENT:      Mouth/Throat:      Mouth: Mucous membranes are moist.      Pharynx: Oropharynx is clear.   Eyes:      Conjunctiva/sclera: Conjunctivae normal.   Neck:      Vascular: No carotid bruit.   Cardiovascular:      Rate and Rhythm: Normal rate and regular rhythm.      Heart sounds: Normal heart sounds.   Pulmonary:      Effort: Pulmonary effort is normal.      Breath sounds: Normal breath sounds.   Skin:     General: Skin is warm and dry.   Psychiatric:         Mood and Affect: Mood normal.         Behavior: Behavior normal.       Neurological Exam  Mental status: Awake, alert, oriented to person, place and time.  Naming, knowledge, repetition, concentration intact.  Delayed recall 2/3 with prompts.  Tangential.     Cranial nerves: Extraocular movements intact.  Pupils equally round and reactive to light, 2 mm bilaterally.  Visual fields normal today.  Facial sensation intact.  Face symmetric.  Speech clear. Hearing intact.  Tongue, uvula, palate midline and intact.  Sternocleidomastoid intact.     Motor:   Deltoid: Right 4/5, left 4/5  Biceps: Right 4/5, left 4/5  Triceps: Right 4/5, left 4/5  : Right 4-/5, left 4-/5  Intrinsic hand muscles: Right 3/5, left 3/5  Significant giveaway weakness in the upper extremities     Iliopsoas: Right 3/5, left 3/5  Quadriceps: Right 4/5, left 4/5  Tibialis anterior: Right 4/5, left 4/5  Medial gastrocnemius: Right 5/5, left 5/5     Slightly high arches at last visit. I did not have him take off his shoes today.     Cerebellar: No dysmetria.  He has difficulty with  heel-to-shin bilaterally due to the weakness.  He walks with a rollator.  He has difficulty arising from the chair without using his arms. Steppage gait.      Deep tendon reflexes trace in the arms, 2+ at the knees and absent at the ankles.     Sensory: Light touch intact.  Allodynia to the temperature of the tuning fork.  Vibration reduced to the knees. Pin prick reduced to the knees.          Administrative Statements   I have spent a total time of 32 minutes in caring for this patient on the day of the visit/encounter including Risks and benefits of tx options, Instructions for management, Patient and family education, Importance of tx compliance, Risk factor reductions, Impressions, Counseling / Coordination of care, Documenting in the medical record, Reviewing / ordering tests, medicine, procedures  , and Obtaining or reviewing history  .

## 2025-02-10 NOTE — PROGRESS NOTES
Name: Aristeo Hall      : 1960      MRN: [id number]  Encounter Provider: Emily Leo PA-C  Encounter Date: 2/10/2025   Encounter department: [facility]  :  Assessment & Plan  Shortness of breath  Given his PFTs w restriction and decreased DLCO, will have him get high res ct  In meantime, will trial step up from Wixela to Trelegy  Continue albuterol PRN    Orders:    CT chest high resolution; Future    Restrictive lung disease    Orders:    CT chest high resolution; Future    Decreased diffusion capacity    Orders:    CT chest high resolution; Future    ARLEEN (obstructive sleep apnea)  Refuses CPAP despite education regarding importance of ARLEEN tx. He does not want to try at all. He insists on getting evaluation for Inspire.  Orders:    Ambulatory Referral to Otolaryngology; Future    Moderate persistent asthma without complication  Consideration for biologic in the future depending on how he does with Trelegy and CT results  Orders:    albuterol (Ventolin HFA) 90 mcg/act inhaler; Inhale 2 puffs every 6 (six) hours as needed for wheezing    fluticasone-umeclidinium-vilanterol (Trelegy Ellipta) 200-62.5-25 mcg/actuation AEPB inhaler; Inhale 1 puff daily Rinse mouth after use.          History of Present Illness   Aristeo Hall is a 64 y.o. male who presents for follow-up. Breathing continues to be an issue for him despite compliance with his Wixela. Nebulizer helps when he uses it. Feels like he is wheezing a lot. Had the flu a few weeks ago.    Review of Systems   Constitutional:  Positive for appetite change.   HENT:  Positive for sore throat.    Respiratory:  Positive for cough, chest tightness, shortness of breath and wheezing.    Musculoskeletal:  Positive for myalgias.   All other systems reviewed and are negative.    Past Medical History   Past Medical History:   Diagnosis Date    Acute on chronic diastolic congestive heart failure (HCC)      Altered gait     Alzheimer disease ([facility])     per patients,,early onset     Angina pectoris ([facility])     Anxiety     Arthritis     Brain aneurysm     coils placed    Cardiac disease     Chest pain 01/13/2016    Chronic kidney disease     Chronic pain     back/ right groin and rle- has morphine pump    Constipation     COPD (chronic obstructive pulmonary disease) ([facility])     Coronary artery disease     Decubital ulcer     sacral decub-occured 5/2019-sees wound care/debide in OR today 6/6/2019    Dependent on walker for ambulation     w/c for long distance    Depression     Diabetes mellitus ([facility])     insulin dependent -- pt states no longer dependent on insulin    Difficulty walking     Dizziness     occ    Dysphagia     Enlarged prostate     Esophageal stricture In file    Esophageal varices ([facility])     Fall     Fall at home 05/03/2019    GERD (gastroesophageal reflux disease)     Heart failure ([facility])     Hiatal hernia     Hx of gastric bypass 11/19/2018    states Nov 2019    Hypercholesterolemia     Hypertension     Ingrown toenail     MI (myocardial infarction) ([facility])     2017- stents x2    Migraine     Morbid obesity ([facility])     gastric bypass sleeve 11/2018-wt loss 125 lb    Myocardial infarction ([facility]) 2003    Neuropathy     Obesity 2003    Oxygen dependent     Q HS  2LPM with CPAP and prn during day 2-3 LPM  ( pt states no longer needs oxygen )    Pressure injury of skin     Pressure injury of skin of sacral region 06/03/2019    Added automatically from request for surgery [id number]    Renal disorder     Shortness of breath     Skin abnormality     sacral wound - covered with pad    Sleep apnea     couldnt tolerate CPAP    Stented coronary artery     Stroke ([facility])     vision loss b/l  2005, residual R leg weakness    Type 2 diabetes mellitus with diabetic neuropathy, with long-term current use of insulin ([facility]) 10/18/2019    Type 2 diabetes mellitus with renal complication ([facility])     insulin dependent    Type 2 diabetes  mellitus, with long-term current use of insulin ([facility]) 10/11/2017    Transitioned From: Diabetes mellitus type 2, uncontrolled    Urinary frequency     Use of cane as ambulatory aid     Wears dentures     Wears glasses     Wears glasses     Wheelchair dependent     states uses walker & cane     Past Surgical History:   Procedure Laterality Date    BACK SURGERY      BRAIN SURGERY      CARDIAC CATHETERIZATION      with stents    CARDIAC CATHETERIZATION N/A 8/18/2023    Procedure: Cardiac Coronary Angiogram;  Surgeon: Horacio Weiner MD;  Location: [facility];  Service: Cardiology    CEREBRAL ANEURYSM REPAIR      with coils    COLONOSCOPY      ESOPHAGOGASTRODUODENOSCOPY N/A 7/1/2016    Procedure: ESOPHAGOGASTRODUODENOSCOPY (EGD);  Surgeon: Reno Christiansen MD;  Location: [facility];  Service:     GASTRIC BYPASS  11/19/2018    HERNIA REPAIR      HIATAL HERNIA REPAIR      INFUSION PUMP IMPLANTATION Left     morphine    INTRATHECAL PUMP IMPLANTATION Left 7/9/2020    Procedure: REVISION INTRATHECAL PAIN PUMP POCKET, LEFT ABDOMEN;  Surgeon: Homero Cho MD;  Location: [facility];  Service: Neurosurgery    KNEE ARTHROSCOPY Right     KNEE ARTHROSCOPY Right     PERONEAL NERVE DECOMPRESSION Right     DC DEBRIDEMENT OPEN WOUND FIRST 20 SQ CM/< N/A 6/6/2019    Procedure: EXCISIONAL DEBRIDEMENT OF SACRAL DECUBITUS ULCER;  Surgeon: Siddhartha Omer MD;  Location: [facility];  Service: General    DC ESOPHAGOGASTRODUODENOSCOPY TRANSORAL DIAGNOSTIC N/A 2/27/2017    Procedure: ESOPHAGOGASTRODUODENOSCOPY (EGD);  Surgeon: Reno Christiansen MD;  Location: [facility];  Service: Gastroenterology    DC ESOPHAGOGASTRODUODENOSCOPY TRANSORAL DIAGNOSTIC N/A 8/23/2018    Procedure: ESOPHAGOGASTRODUODENOSCOPY (EGD) with biopsy;  Surgeon: Reno Christiansen MD;  Location: [facility];  Service: Gastroenterology    DC IMPLTJ/RPLCMT ITHCL/EDRL DRUG NFS PRGRBL PUMP Left 10/13/2020    Procedure: EXPLORATION OF INTRATHECAL PAIN PUMP SYSTEM INTEGRITY FOR POSSIBLE  REPLACEMENT OF CATHETER AND PUMP.;  Surgeon: Homero Cho MD;  Location: [facility];  Service: Neurosurgery    UT IMPLTJ/RPLCMT ITHCL/EDRL DRUG NFS SUBQ RSVR N/A 1/19/2017    Procedure: REMOVAL / EXCHANGE INTRATHECAL PAIN PUMP;  Surgeon: Que Leonard MD;  [facility];  Service: Orthopedics    UT IMPLTJ/RPLCMT ITHCL/EDRL DRUG NFS SUBQ RSVR N/A 5/16/2016    Procedure: REPLACEMENT AND PROGRAM PUMP ;  Surgeon: Que Leonard MD;  [facility];  Service: Orthopedics    UT LAPS RPR PARAESPHGL HRNA INCL FUNDPLSTY W/MESH N/A 7/23/2024    Procedure: REPAIR HERNIA PARAESOPHAGEAL  W ROBOTICS & MAGNETIC SPINCTER AUGMENTATION DEVISE;  Surgeon: Mansi Mauro MD;  [facility];  Service: Bariatrics    UT PRQ IMPLTJ NSTIM ELECTRODE ARRAY EPIDURAL Right 3/17/2021    Procedure: INSERTION THORACIC DORSAL COLUMN SPINAL CORD STIMULATOR PERCUTANEOUS W IMPLANTABLE PULSE GENERATOR, RIGHT;  Surgeon: Homero Cho MD;  Location: [facility];  Service: Neurosurgery     Family History   Problem Relation Age of Onset    Diabetes unspecified Mother     Diabetes Mother     Heart attack Father     Heart disease Father     Hypertension Father     Stroke Father     Diabetes unspecified Brother     Depression Brother     Mental illness Brother     COPD Brother     Drug abuse Brother     Bipolar disorder Brother     Diabetes unspecified Brother     Diabetes unspecified Maternal Grandmother     Diabetes unspecified Paternal Grandmother     Diabetes unspecified Paternal Uncle     ADD / ADHD Cousin     Colon cancer Neg Hx     Colon polyps Neg Hx       reports that he has never smoked. He has never used smokeless tobacco. He reports that he does not currently use alcohol. He reports that he does not currently use drugs after having used the following drugs: Marijuana.  Current Outpatient Medications on File Prior to Visit   Medication Sig Dispense Refill    albuterol (2.5 mg/3 mL) 0.083 % nebulizer solution USE 1 VIAL IN NEBULIZER  EVERY 6 HOURS AS NEEDED FOR WHEEZING OR SHORTNESS OF BREATH 720 mL 2    ammonium lactate (LAC-HYDRIN) 12 % cream APPLY  CREAM TOPICALLY TO AFFECTED AREA TWICE DAILY 385 g 5    aspirin 81 mg chewable tablet Chew 1 tablet (81 mg total) daily 90 tablet 3    atorvastatin (LIPITOR) 80 mg tablet Take 1 tablet (80 mg total) by mouth daily after dinner 90 tablet 3    baclofen 10 mg tablet Take 10 mg by mouth 3 (three) times a day      busPIRone (BUSPAR) 5 mg tablet Take 1 tablet (5 mg total) by mouth 2 (two) times a day 180 tablet 1    CALCIUM PO Take 1 tablet by mouth daily      carvedilol (COREG) 25 mg tablet Take 1 tablet (25 mg total) by mouth 2 (two) times a day with meals 180 tablet 3    clopidogrel (PLAVIX) 75 mg tablet Take 1 tablet (75 mg total) by mouth daily 90 tablet 3    Continuous Glucose Sensor (Dexcom G6 Sensor) MISC 3 PACK SENSOR FOR CONTINUOUS GLUCOSE MONITORING 9 each 1    Continuous Glucose Transmitter (Dexcom G6 Transmitter) MISC 1 TRANSMITTED EVERY 3 MONTHS FOR CONTINUOUS GLUCOSE MONITORING 1 each 0    docusate sodium (COLACE) 100 mg capsule Take 1 capsule (100 mg total) by mouth 2 (two) times a day 180 capsule 3    DULoxetine (Cymbalta) 30 mg delayed release capsule Take 1 capsule (30 mg total) by mouth daily 30 capsule 3    Empagliflozin (JARDIANCE) 10 MG TABS tablet Take 1 tablet (10 mg total) by mouth every morning 90 tablet 1    ergocalciferol (VITAMIN D2) 50,000 units Take 1 capsule (50,000 Units total) by mouth 2 (two) times a week 24 capsule 0    Fluticasone-Salmeterol (Advair) 500-50 mcg/dose inhaler Inhale 1 puff daily Rinse mouth after use 180 blister 1    folic acid (FOLVITE) 1 mg tablet Take 1 tablet by mouth once daily 90 tablet 1    furosemide (LASIX) 40 mg tablet Take once in AM daily. Take once daily in PM PRN weight gain, edema. 180 tablet 3    gabapentin (NEURONTIN) 300 mg capsule Take 1 capsule (300 mg total) by mouth daily as needed (headache) 30 capsule 5    gabapentin (NEURONTIN)  800 mg tablet Take 1 tablet (800 mg total) by mouth 4 (four) times a day 360 tablet 1    hydrocortisone 1 % lotion Apply topically if needed for rash 59 mL 1    lidocaine (LIDODERM) 5 % Apply 1 patch topically daily back      losartan (COZAAR) 50 mg tablet Take 1 tablet (50 mg total) by mouth daily 100 tablet 3    methocarbamol (ROBAXIN) 750 mg tablet TAKE 1 TABLET (750 MG TOTAL) BY MOUTH EVERY 6 (SIX) HOURS AS NEEDED FOR MUSCLE SPASMS 120 tablet 0    naloxegol oxalate (MOVANTIK) 25 MG tablet Take 1 tablet (25 mg total) by mouth daily in the early morning 30 tablet 5    nitroglycerin (NITROSTAT) 0.4 mg SL tablet DISSOLVE ONE TABLET UNDER THE TONGUE EVERY 5 MINUTES AS NEEDED FOR CHEST PAIN.  DO NOT EXCEED A TOTAL OF 3 DOSES IN 15 MINUTES 25 tablet 0    nystatin (MYCOSTATIN) cream Apply 2 g (1 Application total) topically 2 (two) times a day 15 g 0    ranolazine (RANEXA) 1000 MG SR tablet Take 1 tablet (1,000 mg total) by mouth 2 (two) times a day 180 tablet 3    spironolactone (ALDACTONE) 25 mg tablet Take 1 tablet (25 mg total) by mouth daily 90 tablet 3    tamsulosin (FLOMAX) 0.4 mg TAKE 1 CAPSULE BY MOUTH EVERY DAY WITH DINNER 90 capsule 1    Tirzepatide (Mounjaro) 7.5 MG/0.5ML SOAJ Once a week 2 mL 6    topiramate (Topamax) 25 mg tablet Take 1 tablet (25 mg total) by mouth 2 (two) times a day Take 1 tablet once a day for 2 weeks. Then take 1 tablet in the morning and one tablet in the evening. 60 tablet 5    traZODone (DESYREL) 50 mg tablet Take 1 tablet (50 mg total) by mouth 2 (two) times a day 180 tablet 1    vitamin B-12 (VITAMIN B-12) 1,000 mcg tablet Take 1 tablet (1,000 mcg total) by mouth daily 90 tablet 3     No current facility-administered medications on file prior to visit.   No Known Allergies      Historical Information   Tobacco History: never  Occupational History: retired    Objective   /78 (BP Location: Left arm, Patient Position: Sitting, Cuff Size: Large)   Pulse (!) 108   Ht 6' (1.829  m)   Wt 132 kg (290 lb)   SpO2 97%   BMI 39.33 kg/m²      Physical Exam  Vitals reviewed.   Constitutional:       General: He is not in acute distress.     Appearance: He is not toxic-appearing.   HENT:      Head: Normocephalic and atraumatic.   Eyes:      General: No scleral icterus.  Cardiovascular:      Rate and Rhythm: Normal rate and regular rhythm.   Pulmonary:      Effort: Pulmonary effort is normal.      Breath sounds: Normal breath sounds. No wheezing or rhonchi.   Musculoskeletal:         General: No signs of injury.   Skin:     General: Skin is warm and dry.   Neurological:      General: No focal deficit present.      Mental Status: He is alert. Mental status is at baseline.   Psychiatric:         Mood and Affect: Mood normal.         Behavior: Behavior normal.         Lab Results: I have reviewed pertinent labs.    Radiology Results Review: I have reviewed radiology reports from 2023 including: CT chest.  Other Study Results: Other Study Results Review : No additional pertinent studies reviewed.  PFT Results Reviewed: reviewed      Answers submitted by the patient for this visit:  Pulmonology Questionnaire (Submitted on 2/5/2025)  Chief Complaint: Primary symptoms  Do you have shortness of breath that occurs with effort or exertion?: Yes  Do you have heartburn?: Yes  Do you have fatigue?: Yes  Do you have nasal congestion?: Yes  Do you have shortness of breath when lying flat?: Yes  Do you have shortness of breath when you wake up?: Yes

## 2025-02-10 NOTE — ASSESSMENT & PLAN NOTE
Refuses CPAP despite education regarding importance of ARLEEN tx. He does not want to try at all. He insists on getting evaluation for Inspire.  Orders:    Ambulatory Referral to Otolaryngology; Future

## 2025-02-11 ENCOUNTER — OFFICE VISIT (OUTPATIENT)
Dept: NEUROLOGY | Facility: CLINIC | Age: 65
End: 2025-02-11
Payer: COMMERCIAL

## 2025-02-11 VITALS
WEIGHT: 290 LBS | TEMPERATURE: 97.2 F | HEART RATE: 96 BPM | SYSTOLIC BLOOD PRESSURE: 124 MMHG | HEIGHT: 72 IN | DIASTOLIC BLOOD PRESSURE: 84 MMHG | RESPIRATION RATE: 16 BRPM | BODY MASS INDEX: 39.28 KG/M2 | OXYGEN SATURATION: 97 %

## 2025-02-11 DIAGNOSIS — R51.9 CHRONIC DAILY HEADACHE: ICD-10-CM

## 2025-02-11 DIAGNOSIS — M79.2 NEUROPATHIC PAIN: Primary | ICD-10-CM

## 2025-02-11 DIAGNOSIS — G43.709 CHRONIC MIGRAINE WITHOUT AURA WITHOUT STATUS MIGRAINOSUS, NOT INTRACTABLE: ICD-10-CM

## 2025-02-11 DIAGNOSIS — E11.40 DIABETIC NEUROPATHY (HCC): ICD-10-CM

## 2025-02-11 PROBLEM — G90.529 CRPS (COMPLEX REGIONAL PAIN SYNDROME), LOWER LIMB: Status: ACTIVE | Noted: 2025-02-11

## 2025-02-11 PROCEDURE — G2211 COMPLEX E/M VISIT ADD ON: HCPCS | Performed by: PSYCHIATRY & NEUROLOGY

## 2025-02-11 PROCEDURE — 99214 OFFICE O/P EST MOD 30 MIN: CPT | Performed by: PSYCHIATRY & NEUROLOGY

## 2025-02-11 RX ORDER — TOPIRAMATE 50 MG/1
50 TABLET, FILM COATED ORAL EVERY 12 HOURS SCHEDULED
Qty: 60 TABLET | Refills: 5 | Status: SHIPPED | OUTPATIENT
Start: 2025-02-11

## 2025-02-11 RX ORDER — GABAPENTIN 800 MG/1
800 TABLET ORAL 4 TIMES DAILY
Qty: 360 TABLET | Refills: 1 | Status: SHIPPED | OUTPATIENT
Start: 2025-02-11

## 2025-02-11 RX ORDER — GABAPENTIN 300 MG/1
300 CAPSULE ORAL DAILY PRN
Qty: 30 CAPSULE | Refills: 5 | Status: SHIPPED | OUTPATIENT
Start: 2025-02-11

## 2025-02-11 NOTE — PATIENT INSTRUCTIONS
Take Topamax 25 mg in the morning and 2 tablets at night for one week    Then when you  the new prescription, you will take 50 mg (one tablet) in the morning and one at night.

## 2025-02-11 NOTE — ASSESSMENT & PLAN NOTE
On exam, he has components of both large and small fiber neuropathy.  The only clear underlying etiology is diabetes.  B12, B1, B6, Lyme, zinc and immunofixation were all unremarkable.    He tried Lyrica in the past which was also not helpful.  I am reluctant to try a tricyclic antidepressant because of borderline elevated QTc.  He felt the duloxetine was too sedating.  He has tried lidocaine ointment which caused more pain than improvement due to the pain from the actual application.  He has a spinal cord stimulator in place which has not been helpful.  He remains on gabapentin 2400 mg daily.  He has not noticed a benefit from topiramate 25 mg twice daily.  No kidney stones.  Alpha lipoic acid was not helpful and it was stopped.    Recommendations:  -Continue gabapentin 800 mg 4 times daily.  Advised to take it as prescribed.  -Increase topiramate.  He will take 25 mg in the morning and 50 mg for the next week, followed by 50 mg twice daily. We will have to follow cognitive function but he should not have an issue at lower doses.    -We provided a new prescription for wheelchair.  His wheelchair broke.  -Compression socks      Orders:  •  Wheelchair  •  topiramate (Topamax) 50 MG tablet; Take 1 tablet (50 mg total) by mouth every 12 (twelve) hours

## 2025-02-11 NOTE — ASSESSMENT & PLAN NOTE
Lab Results   Component Value Date    HGBA1C 6.3 (H) 11/14/2024     Orders:  •  Wheelchair  •  gabapentin (NEURONTIN) 800 mg tablet; Take 1 tablet (800 mg total) by mouth 4 (four) times a day

## 2025-02-11 NOTE — ASSESSMENT & PLAN NOTE
He has combination of chronic daily headache and chronic migraine.  I have increased topiramate to 50 mg twice daily to help with the neuropathy and hopefully help with the headaches as well.    I will refer him to the Botox clinic.    Orders:  •  topiramate (Topamax) 50 MG tablet; Take 1 tablet (50 mg total) by mouth every 12 (twelve) hours  •  gabapentin (NEURONTIN) 300 mg capsule; Take 1 capsule (300 mg total) by mouth daily as needed (headache)

## 2025-02-11 NOTE — PROGRESS NOTES
Review of Systems   Constitutional:  Negative for appetite change, fatigue and fever.   HENT: Negative.  Negative for hearing loss, tinnitus, trouble swallowing and voice change.    Eyes: Negative.  Negative for photophobia, pain and visual disturbance.   Respiratory: Negative.  Negative for shortness of breath.    Cardiovascular: Negative.  Negative for palpitations.   Gastrointestinal: Negative.  Negative for nausea and vomiting.   Endocrine: Positive for cold intolerance.   Genitourinary: Negative.  Negative for dysuria, frequency and urgency.   Musculoskeletal:  Positive for gait problem. Negative for back pain, myalgias, neck pain and neck stiffness.   Skin: Negative.  Negative for rash.   Allergic/Immunologic: Negative.    Neurological:  Positive for numbness. Negative for dizziness, tremors, seizures, syncope, facial asymmetry, speech difficulty, weakness, light-headedness and headaches.   Hematological: Negative.  Does not bruise/bleed easily.   Psychiatric/Behavioral: Negative.  Negative for confusion, hallucinations and sleep disturbance.    All other systems reviewed and are negative.

## 2025-02-13 NOTE — PROGRESS NOTES
Patient ID: Aristeo Hall is a 64 y.o. male Date of Birth 1960       No chief complaint on file.            Diagnosis:  1. Onychomycosis  2. Type 2 diabetes mellitus with diabetic neuropathy, with long-term current use of insulin ([facility])    Patient presents for at-risk diabetic foot care.  Patient has no acute concerns today.  Patient has significant lower extremity risk due to neuropathy, parasthesia, edema, and trophic skin changes to the lower extremity. Most recent HBA1c was 6.3 on 11/14/24, FBS this am 187,  Last visit with PCP 11/14/24 and Endocrinology was 1/7/25.  Patient has gotten new diabetic shoes in August 2024.    On exam patient has thickened, hypertrophic, discolored, brittle toenails with subungual debris and tenderness x10   Callus: None  Patient does not have BL lower extremity edema  Patient's skin is atrophic, thickened nails, and decreased pedal hair  Patient has decreased pinprick and vibratory sensation to his feet and parasthesia  Patient does not have any  pedal ulcerations.  DP/PT are 2 out of 4 bilaterally    Today's treatment includes:  Debridement of toenails. Using nail nipper, chavo, and curette, nails were sharply debrided, reduced in thickness and length. Devitalized nail tissue and fungal debris excised and removed. Patient tolerated well.      Continue Ammonium lactate 12% cream  to top and bottom of feet, avoid in between the toes.      Discussed proper shoe gear, daily inspections of feet, and general foot health with patient. Patient has Q9 findings and is recommended for at risk foot care every 9-10 weeks.    Patients most recent complete clinical foot exam was on: 9/19/24    The following portions of the patient's history were reviewed and updated as appropriate: allergies, current medications, past family history, past medical history, past social history, past surgical history, and problem list.      There were no vitals taken for this visit.      No pertinent  "results found.      Mary Sanchez, DPM, DPM, FACFAS    Portions of the record may have been created with voice recognition software. Occasional wrong word or \"sound a like\" substitutions may have occurred due to the inherent limitations of voice recognition software. Read the chart carefully and recognize, using context, where substitutions have occurred.  "

## 2025-02-14 ENCOUNTER — OFFICE VISIT (OUTPATIENT)
Dept: PODIATRY | Facility: CLINIC | Age: 65
End: 2025-02-14
Payer: COMMERCIAL

## 2025-02-14 VITALS — HEIGHT: 72 IN | BODY MASS INDEX: 39.28 KG/M2 | WEIGHT: 290 LBS

## 2025-02-14 DIAGNOSIS — Z79.4 TYPE 2 DIABETES MELLITUS WITH DIABETIC NEUROPATHY, WITH LONG-TERM CURRENT USE OF INSULIN (HCC): ICD-10-CM

## 2025-02-14 DIAGNOSIS — E11.40 TYPE 2 DIABETES MELLITUS WITH DIABETIC NEUROPATHY, WITH LONG-TERM CURRENT USE OF INSULIN (HCC): ICD-10-CM

## 2025-02-14 DIAGNOSIS — B35.1 ONYCHOMYCOSIS: Primary | ICD-10-CM

## 2025-02-14 PROCEDURE — 11721 DEBRIDE NAIL 6 OR MORE: CPT | Performed by: PODIATRIST

## 2025-02-17 NOTE — PROGRESS NOTES
Behavioral Health Follow Up Note:      2 / 6  Weight Check:  Surgeon:  Dr. Mansi Mauro  Level 2 Revision Pathway- for conversion sleeve to ANGEL    -status post PEH repair with LINX performed by Dr. Brad Mauro on 7/23/24 returning to office for second post op visit since surgery.   Hx sleeve 2018       Starting weight 290.0  # (discussed need to be (at minimum) at or below starting weight at time case is submitted to insurance)  Today's weight 286.2  #    Surgery month:  JULY-AUG     Mental health and wellness - Patient is appropriately making changes based off the information contained in the bariatric manual.  Patient is modifying behaviors and demonstrating adapting to change.   Fell on ice and hit head-will be following concussion treatment. States he has fatty liver after scan at hospital. Son totaled family care.     Eating behaviors - no appetite but eats more at night-small portions. On Mounjaro. Drinking more water to hydrate-reminded to work on sipping.     Activity -  limited     Progress toward program requirements    Workflow:  Psych and/or D+A additional documentation: n/a  PCP Letter: n/a  Support Group: RECOMMENDED  Surgeon Appt.: 1/2/2025  EGD : [facility] 7/24/24 PH Manometry 4/16/24, EGD 1/23/24   Cardiac Risk Assessment:  2/4/25  (per workflow, needs appt in MAY), needs medical stress test   Sleep Studies: HX ARLEEN ON CPAP-needs machine, 2/24/25 appt to get sleep apnea surgery consult    Bloodwork: ordered    Weight check 3 / 6 scheduled with RD.     Goals:  Continue following the bariatric recommendations to prepare for bariatric surgery

## 2025-02-18 ENCOUNTER — APPOINTMENT (EMERGENCY)
Dept: RADIOLOGY | Facility: HOSPITAL | Age: 65
End: 2025-02-18
Payer: COMMERCIAL

## 2025-02-18 ENCOUNTER — HOSPITAL ENCOUNTER (EMERGENCY)
Facility: HOSPITAL | Age: 65
Discharge: HOME | End: 2025-02-18
Attending: EMERGENCY MEDICINE | Admitting: EMERGENCY MEDICINE
Payer: COMMERCIAL

## 2025-02-18 ENCOUNTER — APPOINTMENT (EMERGENCY)
Dept: CT IMAGING | Facility: HOSPITAL | Age: 65
End: 2025-02-18
Payer: COMMERCIAL

## 2025-02-18 VITALS
DIASTOLIC BLOOD PRESSURE: 95 MMHG | HEART RATE: 76 BPM | OXYGEN SATURATION: 95 % | RESPIRATION RATE: 20 BRPM | TEMPERATURE: 97.6 F | SYSTOLIC BLOOD PRESSURE: 145 MMHG | BODY MASS INDEX: 39.77 KG/M2 | WEIGHT: 293.21 LBS

## 2025-02-18 DIAGNOSIS — M54.9 BACK PAIN: ICD-10-CM
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
## 2025-02-18 DIAGNOSIS — S80.819A: ICD-10-CM

## 2025-02-18 DIAGNOSIS — S09.90XA INJURY OF HEAD, INITIAL ENCOUNTER: ICD-10-CM

## 2025-02-18 LAB
ABO GROUP BLD: NORMAL
ALBUMIN SERPL BCG-MCNC: 3.6 G/DL (ref 3.5–5)
ALP SERPL-CCNC: 79 U/L (ref 34–104)
ALT SERPL W P-5'-P-CCNC: 24 U/L (ref 7–52)
ANION GAP SERPL CALCULATED.3IONS-SCNC: 6 MMOL/L (ref 4–13)
AST SERPL W P-5'-P-CCNC: 27 U/L (ref 13–39)
BASOPHILS # BLD AUTO: 0.06 THOUSANDS/ΜL (ref 0–0.1)
BASOPHILS NFR BLD AUTO: 0 % (ref 0–1)
BILIRUB SERPL-MCNC: 0.46 MG/DL (ref 0.2–1)
BLD GP AB SCN SERPL QL: NEGATIVE
BUN SERPL-MCNC: 13 MG/DL (ref 5–25)
CALCIUM SERPL-MCNC: 8.8 MG/DL (ref 8.4–10.2)
CHLORIDE SERPL-SCNC: 105 MMOL/L (ref 96–108)
CO2 SERPL-SCNC: 26 MMOL/L (ref 21–32)
CREAT SERPL-MCNC: 1.24 MG/DL (ref 0.6–1.3)
EOSINOPHIL # BLD AUTO: 0.53 THOUSAND/ΜL (ref 0–0.61)
EOSINOPHIL NFR BLD AUTO: 4 % (ref 0–6)
ERYTHROCYTE [DISTWIDTH] IN BLOOD BY AUTOMATED COUNT: 13.6 % (ref 11.6–15.1)
GFR SERPL CREATININE-BSD FRML MDRD: 61 ML/MIN/1.73SQ M
GLUCOSE SERPL-MCNC: 88 MG/DL (ref 65–140)
HCT VFR BLD AUTO: 45 % (ref 36.5–49.3)
HGB BLD-MCNC: 14.1 G/DL (ref 12–17)
IMM GRANULOCYTES # BLD AUTO: 0.18 THOUSAND/UL (ref 0–0.2)
IMM GRANULOCYTES NFR BLD AUTO: 1 % (ref 0–2)
INR PPP: 1.16 (ref 0.85–1.19)
LYMPHOCYTES # BLD AUTO: 2.81 THOUSANDS/ΜL (ref 0.6–4.47)
LYMPHOCYTES NFR BLD AUTO: 19 % (ref 14–44)
MCH RBC QN AUTO: 29.7 PG (ref 26.8–34.3)
MCHC RBC AUTO-ENTMCNC: 31.3 G/DL (ref 31.4–37.4)
MCV RBC AUTO: 95 FL (ref 82–98)
MONOCYTES # BLD AUTO: 0.84 THOUSAND/ΜL (ref 0.17–1.22)
MONOCYTES NFR BLD AUTO: 6 % (ref 4–12)
NEUTROPHILS # BLD AUTO: 10.41 THOUSANDS/ΜL (ref 1.85–7.62)
NEUTS SEG NFR BLD AUTO: 70 % (ref 43–75)
NRBC BLD AUTO-RTO: 0 /100 WBCS
PLATELET # BLD AUTO: 321 THOUSANDS/UL (ref 149–390)
PMV BLD AUTO: 10 FL (ref 8.9–12.7)
POTASSIUM SERPL-SCNC: 3.7 MMOL/L (ref 3.5–5.3)
PROT SERPL-MCNC: 8.6 G/DL (ref 6.4–8.4)
PROTHROMBIN TIME: 15.3 SECONDS (ref 12.3–15)
RBC # BLD AUTO: 4.75 MILLION/UL (ref 3.88–5.62)
RH BLD: POSITIVE
SODIUM SERPL-SCNC: 137 MMOL/L (ref 135–147)
SPECIMEN EXPIRATION DATE: NORMAL
WBC # BLD AUTO: 14.83 THOUSAND/UL (ref 4.31–10.16)

## 2025-02-18 PROCEDURE — 36415 COLL VENOUS BLD VENIPUNCTURE: CPT | Performed by: EMERGENCY MEDICINE

## 2025-02-18 PROCEDURE — 74177 CT ABD & PELVIS W/CONTRAST: CPT

## 2025-02-18 PROCEDURE — 85025 COMPLETE CBC W/AUTO DIFF WBC: CPT | Performed by: EMERGENCY MEDICINE

## 2025-02-18 PROCEDURE — 86850 RBC ANTIBODY SCREEN: CPT | Performed by: EMERGENCY MEDICINE

## 2025-02-18 PROCEDURE — 80053 COMPREHEN METABOLIC PANEL: CPT | Performed by: EMERGENCY MEDICINE

## 2025-02-18 PROCEDURE — 73590 X-RAY EXAM OF LOWER LEG: CPT

## 2025-02-18 PROCEDURE — 86901 BLOOD TYPING SEROLOGIC RH(D): CPT | Performed by: EMERGENCY MEDICINE

## 2025-02-18 PROCEDURE — 72170 X-RAY EXAM OF PELVIS: CPT

## 2025-02-18 PROCEDURE — 99284 EMERGENCY DEPT VISIT MOD MDM: CPT

## 2025-02-18 PROCEDURE — 72125 CT NECK SPINE W/O DYE: CPT

## 2025-02-18 PROCEDURE — 96374 THER/PROPH/DIAG INJ IV PUSH: CPT

## 2025-02-18 PROCEDURE — 70450 CT HEAD/BRAIN W/O DYE: CPT

## 2025-02-18 PROCEDURE — 86900 BLOOD TYPING SEROLOGIC ABO: CPT | Performed by: EMERGENCY MEDICINE

## 2025-02-18 PROCEDURE — 85610 PROTHROMBIN TIME: CPT | Performed by: EMERGENCY MEDICINE

## 2025-02-18 PROCEDURE — 99285 EMERGENCY DEPT VISIT HI MDM: CPT | Performed by: EMERGENCY MEDICINE

## 2025-02-18 RX ORDER — HYDROMORPHONE HCL/PF 1 MG/ML
1 SYRINGE (ML) INJECTION ONCE
Refills: 0 | Status: COMPLETED | OUTPATIENT
Start: 2025-02-18 | End: 2025-02-18

## 2025-02-18 RX ORDER — GINSENG 100 MG
1 CAPSULE ORAL ONCE
Status: COMPLETED | OUTPATIENT
Start: 2025-02-18 | End: 2025-02-18

## 2025-02-18 RX ADMIN — HYDROMORPHONE HYDROCHLORIDE 1 MG: 1 INJECTION, SOLUTION INTRAMUSCULAR; INTRAVENOUS; SUBCUTANEOUS at 19:57

## 2025-02-18 RX ADMIN — BACITRACIN 1 SMALL APPLICATION: 500 OINTMENT TOPICAL at 20:33

## 2025-02-18 RX ADMIN — IOHEXOL 100 ML: 350 INJECTION, SOLUTION INTRAVENOUS at 20:10

## 2025-02-19 ENCOUNTER — VBI (OUTPATIENT)
Dept: FAMILY MEDICINE CLINIC | Facility: HOSPITAL | Age: 65
End: 2025-02-19

## 2025-02-19 NOTE — ED PROVIDER NOTES
Emergency Department Trauma Note  Aristoe Hall 64 y.o. male MRN: [id number]  Unit/Bed#: ED 03/ED 03 Encounter: [id number]      Trauma Alert: Trauma Acuity: Trauma Evaluation  Model of Arrival: Mode of Arrival: Direct from scene via    Trauma Team: Current Providers  Attending Provider: Terry Cisneros DO  Registered Nurse: Rosey Coppola RN  Consultants:     None      History of Present Illness     Chief Complaint:   Chief Complaint   Patient presents with    Fall     Pt to ED with reports of falling, slipping on ice, hitting his head. Reports he passed out, on thinners.     HPI:  Aristeo Hall is a 64 y.o. male who presents with fall.  Mechanism:Details of Incident: pt slipped on ice, hit head, +LOC and thinners.          Hx from patient c/o slip on ice and fall twice outside his home just 5 pm today.  He hit back of head first time.  Someone assisted him up.  He hit right shin second time.  He was trying to move his vehicle.  He has a severe headache since the fall.  He has some low back pain and right shin pain.  Non-tender chest.  Tender pelvis.  Gcs 15      Review of Systems   Constitutional:  Negative for chills and fever.   HENT:  Negative for rhinorrhea and sore throat.    Respiratory:  Negative for shortness of breath.    Cardiovascular:  Negative for chest pain.   Gastrointestinal:  Negative for constipation, diarrhea, nausea and vomiting.   Genitourinary:  Negative for dysuria and frequency.   Skin:  Negative for rash.   All other systems reviewed and are negative.      Historical Information     Immunizations:   Immunization History   Administered Date(s) Administered    COVID-19 MODERNA VACC 0.5 ML IM 03/02/2021, 03/30/2021    Fluzone Split Quad 0.5 mL 10/06/2016, 10/13/2017, 10/24/2018, 11/13/2019    INFLUENZA 11/26/2014, 10/06/2016, 10/06/2016, 10/06/2016, 10/12/2017, 10/12/2017, 03/03/2018, 10/24/2018, 10/24/2018, 10/24/2018, 10/26/2018, 11/13/2019, 10/01/2020, 10/01/2020,  10/20/2022, 10/20/2022    Influenza Quadrivalent Preservative Free 3 years and older IM 10/13/2017    Influenza, seasonal, injectable 11/26/2012, 11/26/2014    Pneumococcal Conjugate 13-Valent 10/13/2017    Pneumococcal Polysaccharide PPV23 04/18/2017    Tdap 08/02/2014, 02/15/2020, 01/21/2022, 01/21/2022       Past Medical History:   Diagnosis Date    Acute on chronic diastolic congestive heart failure (HCC)     Altered gait     Alzheimer disease ([facility])     per patients,,early onset     Aneurysm ([facility])     Angina pectoris ([facility])     Anxiety     Arthritis [id number]    Brain aneurysm     coils placed    Bronchiectasis ([facility]) [id number]    Cardiac disease     Chest pain 01/13/2016    Chronic bronchitis ([facility]) [id number]    Chronic kidney disease 1016    Chronic pain     back/ right groin and rle- has morphine pump    Cluster headache     Constipation     COPD (chronic obstructive pulmonary disease) ([facility]) [id number]    Coronary artery disease 2003    Decubital ulcer     sacral decub-occured 5/2019-sees wound care/debide in OR today 6/6/2019    Dependent on walker for ambulation     w/c for long distance    Depression     Diabetes mellitus ([facility])     insulin dependent -- pt states no longer dependent on insulin    Difficulty walking     Dizziness     occ    Dysphagia     Enlarged prostate     Esophageal stricture In file    Esophageal varices ([facility])     Fall     Fall at home 05/03/2019    GERD (gastroesophageal reflux disease) [id number]    Headache, tension-type     Heart failure ([facility])     Hiatal hernia     Hx of gastric bypass 11/19/2018    states Nov 2019    Hypercholesterolemia     Hypertension 2003    Ingrown toenail     Memory loss     MI (myocardial infarction) ([facility])     2017- stents x2    Migraine     Morbid obesity ([facility])     gastric bypass sleeve 11/2018-wt loss 125 lb    Myocardial infarction ([facility]) 2003    Neuropathy     Obesity 2003    Oxygen dependent     Q HS  2LPM with CPAP and prn during day 2-3 LPM  ( pt states no  longer needs oxygen )    Peripheral neuropathy     Pressure injury of skin     Pressure injury of skin of sacral region 06/03/2019    Added automatically from request for surgery [id number]    Renal disorder     Shortness of breath     Skin abnormality     sacral wound - covered with pad    Sleep apnea     couldnt tolerate CPAP    Sleep apnea, obstructive 2016    Stented coronary artery     Stroke ([facility]) 2003    vision loss b/l  2005, residual R leg weakness    Type 2 diabetes mellitus with diabetic neuropathy, with long-term current use of insulin ([facility]) 10/18/2019    Type 2 diabetes mellitus with renal complication ([facility])     insulin dependent    Type 2 diabetes mellitus, with long-term current use of insulin ([facility]) 10/11/2017    Transitioned From: Diabetes mellitus type 2, uncontrolled    Urinary frequency     Use of cane as ambulatory aid     Vision loss     Wears dentures     Wears glasses     Wears glasses     Wheelchair dependent     states uses walker & cane       Family History   Problem Relation Age of Onset    Diabetes unspecified Mother     Diabetes Mother     Migraines Mother     Heart attack Father     Heart disease Father     Hypertension Father     Stroke Father     Neuropathy Father     Diabetes unspecified Brother     Depression Brother     Mental illness Brother     COPD Brother     Drug abuse Brother     Bipolar disorder Brother     Diabetes unspecified Brother     Diabetes unspecified Maternal Grandmother     Diabetes unspecified Paternal Grandmother     Diabetes unspecified Paternal Uncle     ADD / ADHD Cousin     Colon cancer Neg Hx     Colon polyps Neg Hx      Past Surgical History:   Procedure Laterality Date    BACK SURGERY      BRAIN SURGERY      CARDIAC CATHETERIZATION      with stents    CARDIAC CATHETERIZATION N/A 08/18/2023    Procedure: Cardiac Coronary Angiogram;  Surgeon: Horacio Weiner MD;  Location: [facility];  Service: Cardiology    CEREBRAL ANEURYSM REPAIR      with coils     COLONOSCOPY      ESOPHAGOGASTRODUODENOSCOPY N/A 07/01/2016    Procedure: ESOPHAGOGASTRODUODENOSCOPY (EGD);  Surgeon: Reno Christiansen MD;  Location: [facility];  Service:     GASTRIC BYPASS  11/19/2018    HERNIA REPAIR  2015    HIATAL HERNIA REPAIR      INFUSION PUMP IMPLANTATION Left     morphine    INTRATHECAL PUMP IMPLANTATION Left 07/09/2020    Procedure: REVISION INTRATHECAL PAIN PUMP POCKET, LEFT ABDOMEN;  Surgeon: Homero Cho MD;  Location: [facility];  Service: Neurosurgery    KNEE ARTHROSCOPY Right     KNEE ARTHROSCOPY Right     PERONEAL NERVE DECOMPRESSION Right     VT DEBRIDEMENT OPEN WOUND FIRST 20 SQ CM/< N/A 06/06/2019    Procedure: EXCISIONAL DEBRIDEMENT OF SACRAL DECUBITUS ULCER;  Surgeon: Siddhartha Omer MD;  Location: [facility];  Service: General    VT ESOPHAGOGASTRODUODENOSCOPY TRANSORAL DIAGNOSTIC N/A 02/27/2017    Procedure: ESOPHAGOGASTRODUODENOSCOPY (EGD);  Surgeon: Reno Christiansen MD;  Location: [facility];  Service: Gastroenterology    VT ESOPHAGOGASTRODUODENOSCOPY TRANSORAL DIAGNOSTIC N/A 08/23/2018    Procedure: ESOPHAGOGASTRODUODENOSCOPY (EGD) with biopsy;  Surgeon: Reno Christiansen MD;  [facility]: [facility];  Service: Gastroenterology    VT IMPLTJ/RPLCMT ITHCL/EDRL DRUG NFS PRGRBL PUMP Left 10/13/2020    Procedure: EXPLORATION OF INTRATHECAL PAIN PUMP SYSTEM INTEGRITY FOR POSSIBLE REPLACEMENT OF CATHETER AND PUMP.;  Surgeon: Homero Cho MD;  Location: [facility];  Service: Neurosurgery    VT IMPLTJ/RPLCMT ITHCL/EDRL DRUG NFS SUBQ RSVR N/A 01/19/2017    Procedure: REMOVAL / EXCHANGE INTRATHECAL PAIN PUMP;  Surgeon: Que Leonard MD;  [facility];  Service: Orthopedics    VT IMPLTJ/RPLCMT ITHCL/EDRL DRUG NFS SUBQ RSVR N/A 05/16/2016    Procedure: REPLACEMENT AND PROGRAM PUMP ;  Surgeon: Que Leonard MD;  [facility];  Service: Orthopedics    VT LAPS RPR PARAESPHGL HRNA INCL FUNDPLSTY W/MESH N/A 07/23/2024    Procedure: REPAIR HERNIA PARAESOPHAGEAL  W ROBOTICS & MAGNETIC SPINCTER  AUGMENTATION DEVISE;  Surgeon: Mansi Mauro MD;  [facility];  Service: Bariatrics    MI PRQ IMPLTJ NSTIM ELECTRODE ARRAY EPIDURAL Right 2021    Procedure: INSERTION THORACIC DORSAL COLUMN SPINAL CORD STIMULATOR PERCUTANEOUS W IMPLANTABLE PULSE GENERATOR, RIGHT;  Surgeon: Homero Cho MD;  Location[facility];  Service: Neurosurgery     Social History     Tobacco Use    Smoking status: Never    Smokeless tobacco: Never   Vaping Use    Vaping status: Never Used   Substance Use Topics    Alcohol use: Not Currently    Drug use: Not Currently     Types: Marijuana, Morphine     Comment: Medical card is for CBD cream . states no THC use     E-Cigarette/Vaping    E-Cigarette Use Never User      E-Cigarette/Vaping Substances    Nicotine No     THC No     CBD No     Flavoring No     Other No     Unknown No        Family History: non-contributory    Meds/Allergies   Prior to Admission Medications   Prescriptions Last Dose Informant Patient Reported? Taking?   CALCIUM PO  Self Yes No   Sig: Take 1 tablet by mouth daily   Continuous Glucose Sensor (Dexcom G6 Sensor) MISC  Self No No   Sig: 3 PACK SENSOR FOR CONTINUOUS GLUCOSE MONITORING   Continuous Glucose Transmitter (Dexcom G6 Transmitter) MISC  Self No No   Si TRANSMITTED EVERY 3 MONTHS FOR CONTINUOUS GLUCOSE MONITORING   DULoxetine (Cymbalta) 30 mg delayed release capsule  Self No No   Sig: Take 1 capsule (30 mg total) by mouth daily   Empagliflozin (JARDIANCE) 10 MG TABS tablet  Self No No   Sig: Take 1 tablet (10 mg total) by mouth every morning   Fluticasone-Salmeterol (Advair) 500-50 mcg/dose inhaler  Self No No   Sig: Inhale 1 puff daily Rinse mouth after use   Tirzepatide (Mounjaro) 7.5 MG/0.5ML SOAJ  Self No No   Sig: Once a week   albuterol (2.5 mg/3 mL) 0.083 % nebulizer solution  Self No No   Sig: USE 1 VIAL IN NEBULIZER EVERY 6 HOURS AS NEEDED FOR WHEEZING OR SHORTNESS OF BREATH   albuterol (Ventolin HFA) 90 mcg/act inhaler  Self No No    Sig: Inhale 2 puffs every 6 (six) hours as needed for wheezing   ammonium lactate (LAC-HYDRIN) 12 % cream  Self No No   Sig: APPLY  CREAM TOPICALLY TO AFFECTED AREA TWICE DAILY   aspirin 81 mg chewable tablet  Self No No   Sig: Chew 1 tablet (81 mg total) daily   atorvastatin (LIPITOR) 80 mg tablet  Self No No   Sig: Take 1 tablet (80 mg total) by mouth daily after dinner   baclofen 10 mg tablet  Self Yes No   Sig: Take 10 mg by mouth 3 (three) times a day   busPIRone (BUSPAR) 5 mg tablet  Self No No   Sig: Take 1 tablet (5 mg total) by mouth 2 (two) times a day   carvedilol (COREG) 25 mg tablet  Self No No   Sig: Take 1 tablet (25 mg total) by mouth 2 (two) times a day with meals   clopidogrel (PLAVIX) 75 mg tablet  Self No No   Sig: Take 1 tablet (75 mg total) by mouth daily   docusate sodium (COLACE) 100 mg capsule  Self No No   Sig: Take 1 capsule (100 mg total) by mouth 2 (two) times a day   ergocalciferol (VITAMIN D2) 50,000 units  Self No No   Sig: Take 1 capsule (50,000 Units total) by mouth 2 (two) times a week   fluticasone-umeclidinium-vilanterol (Trelegy Ellipta) 200-62.5-25 mcg/actuation AEPB inhaler  Self No No   Sig: Inhale 1 puff daily Rinse mouth after use.   folic acid (FOLVITE) 1 mg tablet  Self No No   Sig: Take 1 tablet by mouth once daily   furosemide (LASIX) 40 mg tablet  Self No No   Sig: Take once in AM daily. Take once daily in PM PRN weight gain, edema.   gabapentin (NEURONTIN) 300 mg capsule   No No   Sig: Take 1 capsule (300 mg total) by mouth daily as needed (headache)   gabapentin (NEURONTIN) 800 mg tablet   No No   Sig: Take 1 tablet (800 mg total) by mouth 4 (four) times a day   hydrocortisone 1 % lotion  Self No No   Sig: Apply topically if needed for rash   lidocaine (LIDODERM) 5 %  Self Yes No   Sig: Apply 1 patch topically daily back   losartan (COZAAR) 50 mg tablet  Self No No   Sig: Take 1 tablet (50 mg total) by mouth daily   methocarbamol (ROBAXIN) 750 mg tablet  Self No No    Sig: TAKE 1 TABLET (750 MG TOTAL) BY MOUTH EVERY 6 (SIX) HOURS AS NEEDED FOR MUSCLE SPASMS   naloxegol oxalate (MOVANTIK) 25 MG tablet  Self No No   Sig: Take 1 tablet (25 mg total) by mouth daily in the early morning   nitroglycerin (NITROSTAT) 0.4 mg SL tablet  Self No No   Sig: DISSOLVE ONE TABLET UNDER THE TONGUE EVERY 5 MINUTES AS NEEDED FOR CHEST PAIN.  DO NOT EXCEED A TOTAL OF 3 DOSES IN 15 MINUTES   nystatin (MYCOSTATIN) cream  Self No No   Sig: Apply 2 g (1 Application total) topically 2 (two) times a day   ranolazine (RANEXA) 1000 MG SR tablet  Self No No   Sig: Take 1 tablet (1,000 mg total) by mouth 2 (two) times a day   spironolactone (ALDACTONE) 25 mg tablet  Self No No   Sig: Take 1 tablet (25 mg total) by mouth daily   tamsulosin (FLOMAX) 0.4 mg  Self No No   Sig: TAKE 1 CAPSULE BY MOUTH EVERY DAY WITH DINNER   topiramate (Topamax) 50 MG tablet   No No   Sig: Take 1 tablet (50 mg total) by mouth every 12 (twelve) hours   traZODone (DESYREL) 50 mg tablet  Self No No   Sig: Take 1 tablet (50 mg total) by mouth 2 (two) times a day   vitamin B-12 (VITAMIN B-12) 1,000 mcg tablet  Self No No   Sig: Take 1 tablet (1,000 mcg total) by mouth daily      Facility-Administered Medications: None       No Known Allergies    PHYSICAL EXAM    PE limited by: nothing    Objective   Vitals:   First set: Temperature: 97.6 °F (36.4 °C) (02/18/25 1920)  Pulse: 99 (02/18/25 1920)  Respirations: 19 (02/18/25 1920)  Blood Pressure: (!) 188/94 (02/18/25 1920)  SpO2: 96 % (02/18/25 1920)    Primary Survey:   (A) Airway: patent  (B) Breathing: clear  (C) Circulation: Pulses:   normal  (D) Disabliity:  GCS Total:  15  (E) Expose:  Completed    Secondary Survey: (Click on Physical Exam tab above)  Physical Exam  Vitals and nursing note reviewed.   Constitutional:       Appearance: He is well-developed.   HENT:      Head: Normocephalic and atraumatic.      Right Ear: External ear normal.      Left Ear: External ear normal.       Nose: Nose normal.   Eyes:      Conjunctiva/sclera: Conjunctivae normal.      Pupils: Pupils are equal, round, and reactive to light.   Cardiovascular:      Rate and Rhythm: Normal rate and regular rhythm.      Heart sounds: Normal heart sounds.   Pulmonary:      Effort: Pulmonary effort is normal. No respiratory distress.      Breath sounds: Normal breath sounds. No wheezing.   Chest:      Chest wall: No tenderness.   Abdominal:      General: Bowel sounds are normal. There is no distension.      Palpations: Abdomen is soft.      Tenderness: There is no abdominal tenderness.   Musculoskeletal:         General: No deformity. Normal range of motion.      Cervical back: Normal range of motion and neck supple. Tenderness and bony tenderness present. No spinous process tenderness.      Thoracic back: No bony tenderness.      Lumbar back: Tenderness and bony tenderness present.      Right lower leg: Tenderness present.        Legs:    Skin:     General: Skin is warm and dry.      Findings: No rash.   Neurological:      General: No focal deficit present.      Mental Status: He is alert.      GCS: GCS eye subscore is 4. GCS verbal subscore is 5. GCS motor subscore is 6.      Sensory: No sensory deficit.   Psychiatric:         Mood and Affect: Mood normal.         Cervical spine cleared by clinical criteria? No (imaging required)      Invasive Devices       Line  Duration             Pump Device Pain pump Left Abdomen -- days                    Lab Results:   Results Reviewed       Procedure Component Value Units Date/Time    Protime-INR [[id number]]  (Abnormal) Collected: 02/18/25 2033    Lab Status: Final result Specimen: Blood from Arm, Right Updated: 02/18/25 2056     Protime 15.3 seconds      INR 1.16    Narrative:      INR Therapeutic Range    Indication                                             INR Range      Atrial Fibrillation                                               2.0-3.0  Hypercoagulable State                                     2.0.2.3  Left Ventricular Asist Device                            2.0-3.0  Mechanical Heart Valve                                  -    Aortic(with afib, MI, embolism, HF, LA enlargement,    and/or coagulopathy)                                     2.0-3.0 (2.5-3.5)     Mitral                                                             2.5-3.5  Prosthetic/Bioprosthetic Heart Valve               2.0-3.0  Venous thromboembolism (VTE: VT, PE        2.0-3.0    Comprehensive metabolic panel [[id number]]  (Abnormal) Collected: 02/18/25 1946    Lab Status: Final result Specimen: Blood from Arm, Right Updated: 02/18/25 2015     Sodium 137 mmol/L      Potassium 3.7 mmol/L      Chloride 105 mmol/L      CO2 26 mmol/L      ANION GAP 6 mmol/L      BUN 13 mg/dL      Creatinine 1.24 mg/dL      Glucose 88 mg/dL      Calcium 8.8 mg/dL      AST 27 U/L      ALT 24 U/L      Alkaline Phosphatase 79 U/L      Total Protein 8.6 g/dL      Albumin 3.6 g/dL      Total Bilirubin 0.46 mg/dL      eGFR 61 ml/min/1.73sq m     Narrative:      National Kid[employer] Disease Foundation guidelines for Chronic Kidney Disease (CKD):     Stage 1 with normal or high GFR (GFR > 90 mL/min/1.73 square meters)    Stage 2 Mild CKD (GFR = 60-89 mL/min/1.73 square meters)    Stage 3A Moderate CKD (GFR = 45-59 mL/min/1.73 square meters)    Stage 3B Moderate CKD (GFR = 30-44 mL/min/1.73 square meters)    Stage 4 Severe CKD (GFR = 15-29 mL/min/1.73 square meters)    Stage 5 End Stage CKD (GFR <15 mL/min/1.73 square meters)  Note: GFR calculation is accurate only with a steady state creatinine    CBC and differential [[id number]]  (Abnormal) Collected: 02/18/25 1946    Lab Status: Final result Specimen: Blood from Arm, Right Updated: 02/18/25 1957     WBC 14.83 Thousand/uL      RBC 4.75 Million/uL      Hemoglobin 14.1 g/dL      Hematocrit 45.0 %      MCV 95 fL      MCH 29.7 pg      MCHC 31.3 g/dL      RDW 13.6 %      MPV 10.0 fL      Platelets 321  Thousands/uL      nRBC 0 /100 WBCs      Segmented % 70 %      Immature Grans % 1 %      Lymphocytes % 19 %      Monocytes % 6 %      Eosinophils Relative 4 %      Basophils Relative 0 %      Absolute Neutrophils 10.41 Thousands/µL      Absolute Immature Grans 0.18 Thousand/uL      Absolute Lymphocytes 2.81 Thousands/µL      Absolute Monocytes 0.84 Thousand/µL      Eosinophils Absolute 0.53 Thousand/µL      Basophils Absolute 0.06 Thousands/µL                    Imaging Studies:   Direct to CT: Yes  TRAUMA - CT head wo contrast   Final Result by Yunier Calvin MD (02/18 2038)      No acute intracranial abnormality.                  Workstation performed: [id number]         TRAUMA - CT spine cervical wo contrast   Final Result by Yunier Calvin MD (02/18 2040)      No cervical spine fracture or traumatic malalignment.                  Workstation performed: [id number]         TRAUMA - CT abdomen pelvis w contrast   Final Result by Yunier Calvin MD (02/18 2048)      No acute posttraumatic abnormality detected in the abdomen or pelvis.      Fatty liver.         Workstation performed: [id number]         CT recon only lumbar spine   Final Result by Kusum Back MD (02/18 2126)      No acute fracture or traumatic subluxation.               Workstation performed: [id number]         XR tibia fibula 2 views RIGHT   ED Interpretation by Terry Cisneros DO (02/18 2107)   No fx or dislocation, no acute abnl      XR Trauma pelvis ap only 1 or 2 vw   Final Result by Kusum Back MD (02/18 2006)      No definite fracture or hip dislocation.                  Workstation performed: [id number]               Procedures  Procedures         ED Course       cervical collar removed by me after imaging, no pain with AROM against resistance     Medical Decision Making  Diff includes mechanical fall, consider intracranial hemorrhage, cervical fracture, low back fracture, pelvic fracture, lower leg fracture.  More likely concussion,  cervical strain, abrasions and bruising    Amount and/or Complexity of Data Reviewed  Labs: ordered.  Radiology: ordered and independent interpretation performed.    Risk  OTC drugs.  Prescription drug management.                Disposition  [facility] One Transfer: No  Final diagnoses:   Fall, initial encounter   Injury of head, initial encounter   Back pain   Abrasion, lower leg, anterior     Time reflects when diagnosis was documented in both MDM as applicable and the Disposition within this note       Time User Action Codes Description Comment    2/18/2025  9:15 PM Terry Cisneros [W19.XXXA] Fall, initial encounter     2/18/2025  9:15 PM Terry Cisneros [S09.90XA] Injury of head, initial encounter     2/18/2025  9:15 PM Terry Cisneros [M54.9] Back pain     2/18/2025  9:15 PM Terry Cisneros [S80.819A] Abrasion, lower leg, anterior           ED Disposition       ED Disposition   Discharge    Condition   Stable    Date/Time   Tue Feb 18, 2025  9:15 PM    Comment   Aristeo Hall discharge to home/self care.                   Follow-up Information    None       Discharge Medication List as of 2/18/2025  9:17 PM        CONTINUE these medications which have NOT CHANGED    Details   albuterol (2.5 mg/3 mL) 0.083 % nebulizer solution USE 1 VIAL IN NEBULIZER EVERY 6 HOURS AS NEEDED FOR WHEEZING OR SHORTNESS OF BREATH, Normal      albuterol (Ventolin HFA) 90 mcg/act inhaler Inhale 2 puffs every 6 (six) hours as needed for wheezing, Starting Mon 2/10/2025, Normal      ammonium lactate (LAC-HYDRIN) 12 % cream APPLY  CREAM TOPICALLY TO AFFECTED AREA TWICE DAILY, Normal      aspirin 81 mg chewable tablet Chew 1 tablet (81 mg total) daily, Starting Tue 2/4/2025, Normal      atorvastatin (LIPITOR) 80 mg tablet Take 1 tablet (80 mg total) by mouth daily after dinner, Starting Tue 2/4/2025, Normal      baclofen 10 mg tablet Take 10 mg by mouth 3 (three) times a day, Historical Med      busPIRone (BUSPAR) 5 mg tablet  Take 1 tablet (5 mg total) by mouth 2 (two) times a day, Starting Thu 11/14/2024, Normal      CALCIUM PO Take 1 tablet by mouth daily, Historical Med      carvedilol (COREG) 25 mg tablet Take 1 tablet (25 mg total) by mouth 2 (two) times a day with meals, Starting Tue 2/4/2025, Normal      clopidogrel (PLAVIX) 75 mg tablet Take 1 tablet (75 mg total) by mouth daily, Starting Tue 2/4/2025, Normal      Continuous Glucose Sensor (Dexcom G6 Sensor) MISC 3 PACK SENSOR FOR CONTINUOUS GLUCOSE MONITORING, Normal      Continuous Glucose Transmitter (Dexcom G6 Transmitter) MISC 1 TRANSMITTED EVERY 3 MONTHS FOR CONTINUOUS GLUCOSE MONITORING, Normal      docusate sodium (COLACE) 100 mg capsule Take 1 capsule (100 mg total) by mouth 2 (two) times a day, Starting Wed 1/31/2024, Normal      DULoxetine (Cymbalta) 30 mg delayed release capsule Take 1 capsule (30 mg total) by mouth daily, Starting Tue 7/2/2024, Normal      Empagliflozin (JARDIANCE) 10 MG TABS tablet Take 1 tablet (10 mg total) by mouth every morning, Starting Tue 1/7/2025, Normal      ergocalciferol (VITAMIN D2) 50,000 units Take 1 capsule (50,000 Units total) by mouth 2 (two) times a week, Starting Mon 11/18/2024, Normal      Fluticasone-Salmeterol (Advair) 500-50 mcg/dose inhaler Inhale 1 puff daily Rinse mouth after use, Starting Thu 1/30/2025, Normal      fluticasone-umeclidinium-vilanterol (Trelegy Ellipta) 200-62.5-25 mcg/actuation AEPB inhaler Inhale 1 puff daily Rinse mouth after use., Starting Mon 2/10/2025, Until Wed 3/12/2025, Normal      folic acid (FOLVITE) 1 mg tablet Take 1 tablet by mouth once daily, Starting Thu 12/19/2024, Normal      furosemide (LASIX) 40 mg tablet Take once in AM daily. Take once daily in PM PRN weight gain, edema., Normal      gabapentin (NEURONTIN) 300 mg capsule Take 1 capsule (300 mg total) by mouth daily as needed (headache), Starting Tue 2/11/2025, Normal      gabapentin (NEURONTIN) 800 mg tablet Take 1 tablet (800 mg total)  by mouth 4 (four) times a day, Starting Tue 2/11/2025, Normal      hydrocortisone 1 % lotion Apply topically if needed for rash, Starting Tue 3/5/2024, Normal      lidocaine (LIDODERM) 5 % Apply 1 patch topically daily back, Starting Mon 8/21/2023, Historical Med      losartan (COZAAR) 50 mg tablet Take 1 tablet (50 mg total) by mouth daily, Starting Tue 2/4/2025, Normal      methocarbamol (ROBAXIN) 750 mg tablet TAKE 1 TABLET (750 MG TOTAL) BY MOUTH EVERY 6 (SIX) HOURS AS NEEDED FOR MUSCLE SPASMS, Starting Wed 1/3/2024, Normal      naloxegol oxalate (MOVANTIK) 25 MG tablet Take 1 tablet (25 mg total) by mouth daily in the early morning, Starting Wed 1/31/2024, Normal      nitroglycerin (NITROSTAT) 0.4 mg SL tablet DISSOLVE ONE TABLET UNDER THE TONGUE EVERY 5 MINUTES AS NEEDED FOR CHEST PAIN.  DO NOT EXCEED A TOTAL OF 3 DOSES IN 15 MINUTES, Starting Fri 12/27/2024, Normal      nystatin (MYCOSTATIN) cream Apply 2 g (1 Application total) topically 2 (two) times a day, Starting Tue 3/5/2024, Normal      ranolazine (RANEXA) 1000 MG SR tablet Take 1 tablet (1,000 mg total) by mouth 2 (two) times a day, Starting Tue 2/4/2025, Normal      spironolactone (ALDACTONE) 25 mg tablet Take 1 tablet (25 mg total) by mouth daily, Starting Tue 2/4/2025, Normal      tamsulosin (FLOMAX) 0.4 mg TAKE 1 CAPSULE BY MOUTH EVERY DAY WITH DINNER, Starting Sat 3/23/2024, Normal      Tirzepatide (Mounjaro) 7.5 MG/0.5ML SOAJ Once a week, Normal      topiramate (Topamax) 50 MG tablet Take 1 tablet (50 mg total) by mouth every 12 (twelve) hours, Starting Tue 2/11/2025, Normal      traZODone (DESYREL) 50 mg tablet Take 1 tablet (50 mg total) by mouth 2 (two) times a day, Starting Thu 1/30/2025, Normal      vitamin B-12 (VITAMIN B-12) 1,000 mcg tablet Take 1 tablet (1,000 mcg total) by mouth daily, Starting Tue 3/5/2024, Normal               PDMP Review         Value Time User    PDMP Reviewed  Yes 8/23/2023  6:08 PM Juan Jose Grace MD             ED Provider  Electronically Signed by           Terry Cisneros DO  02/19/25 0234

## 2025-02-19 NOTE — TELEPHONE ENCOUNTER
02/19/25 12:29 PM    Patient contacted post ED visit, [facility] department spoke with patient/caregiver and outreach was successful.    Thank you.  January Curtis MA  [facility]

## 2025-02-20 ENCOUNTER — CLINICAL SUPPORT (OUTPATIENT)
Dept: BARIATRICS | Facility: CLINIC | Age: 65
End: 2025-02-20

## 2025-02-20 VITALS — BODY MASS INDEX: 38.82 KG/M2 | WEIGHT: 286.2 LBS

## 2025-02-20 DIAGNOSIS — E66.812 OBESITY, CLASS II, BMI 35-39.9: Primary | ICD-10-CM

## 2025-02-20 PROCEDURE — RECHECK

## 2025-02-21 DIAGNOSIS — E11.40 TYPE 2 DIABETES MELLITUS WITH DIABETIC NEUROPATHY, WITHOUT LONG-TERM CURRENT USE OF INSULIN (HCC): ICD-10-CM

## 2025-02-21 RX ORDER — PROCHLORPERAZINE 25 MG/1
SUPPOSITORY RECTAL
Qty: 9 EACH | Refills: 1 | Status: SHIPPED | OUTPATIENT
Start: 2025-02-21

## 2025-02-24 ENCOUNTER — HOSPITAL ENCOUNTER (OUTPATIENT)
Dept: CT IMAGING | Facility: HOSPITAL | Age: 65
Discharge: HOME | End: 2025-02-24
Payer: COMMERCIAL

## 2025-02-24 DIAGNOSIS — R94.2 DECREASED DIFFUSION CAPACITY: ICD-10-CM

## 2025-02-24 DIAGNOSIS — J98.4 RESTRICTIVE LUNG DISEASE: ICD-10-CM

## 2025-02-24 DIAGNOSIS — R06.02 SHORTNESS OF BREATH: ICD-10-CM

## 2025-02-24 PROCEDURE — 71250 CT THORAX DX C-: CPT

## 2025-02-25 ENCOUNTER — EVALUATION (OUTPATIENT)
Facility: CLINIC | Age: 65
End: 2025-02-25
Payer: COMMERCIAL

## 2025-02-25 DIAGNOSIS — S09.90XA INJURY OF HEAD, INITIAL ENCOUNTER: ICD-10-CM

## 2025-02-25 PROCEDURE — 97163 PT EVAL HIGH COMPLEX 45 MIN: CPT

## 2025-03-04 ENCOUNTER — APPOINTMENT (OUTPATIENT)
Dept: LAB | Facility: HOSPITAL | Age: 65
End: 2025-03-04
Payer: COMMERCIAL

## 2025-03-04 DIAGNOSIS — E55.9 VITAMIN D DEFICIENCY: ICD-10-CM

## 2025-03-04 DIAGNOSIS — R68.89 FLU-LIKE SYMPTOMS: ICD-10-CM

## 2025-03-04 DIAGNOSIS — Z98.84 BARIATRIC SURGERY STATUS: ICD-10-CM

## 2025-03-04 DIAGNOSIS — I50.32 CHRONIC DIASTOLIC CONGESTIVE HEART FAILURE (HCC): ICD-10-CM

## 2025-03-04 DIAGNOSIS — R79.89 HIGH SERUM PARATHYROID HORMONE (PTH): ICD-10-CM

## 2025-03-04 LAB
25(OH)D3 SERPL-MCNC: 13.1 NG/ML (ref 30–100)
ANION GAP SERPL CALCULATED.3IONS-SCNC: 9 MMOL/L (ref 4–13)
BNP SERPL-MCNC: 40 PG/ML (ref 0–100)
BUN SERPL-MCNC: 11 MG/DL (ref 5–25)
CALCIUM SERPL-MCNC: 9 MG/DL (ref 8.4–10.2)
CHLORIDE SERPL-SCNC: 104 MMOL/L (ref 96–108)
CO2 SERPL-SCNC: 24 MMOL/L (ref 21–32)
CREAT SERPL-MCNC: 0.97 MG/DL (ref 0.6–1.3)
GFR SERPL CREATININE-BSD FRML MDRD: 83 ML/MIN/1.73SQ M
GLUCOSE P FAST SERPL-MCNC: 125 MG/DL (ref 65–99)
POTASSIUM SERPL-SCNC: 3.9 MMOL/L (ref 3.5–5.3)
PTH-INTACT SERPL-MCNC: 116.1 PG/ML (ref 12–88)
SODIUM SERPL-SCNC: 137 MMOL/L (ref 135–147)

## 2025-03-04 PROCEDURE — 82306 VITAMIN D 25 HYDROXY: CPT

## 2025-03-04 PROCEDURE — 36415 COLL VENOUS BLD VENIPUNCTURE: CPT

## 2025-03-04 PROCEDURE — 80048 BASIC METABOLIC PNL TOTAL CA: CPT

## 2025-03-04 PROCEDURE — 83880 ASSAY OF NATRIURETIC PEPTIDE: CPT

## 2025-03-04 PROCEDURE — 83970 ASSAY OF PARATHORMONE: CPT

## 2025-03-05 ENCOUNTER — TELEPHONE (OUTPATIENT)
Dept: GASTROENTEROLOGY | Facility: CLINIC | Age: 65
End: 2025-03-05

## 2025-03-05 ENCOUNTER — OFFICE VISIT (OUTPATIENT)
Dept: GASTROENTEROLOGY | Facility: CLINIC | Age: 65
End: 2025-03-05
Payer: COMMERCIAL

## 2025-03-05 ENCOUNTER — RESULTS FOLLOW-UP (OUTPATIENT)
Age: 65
End: 2025-03-05

## 2025-03-05 VITALS
DIASTOLIC BLOOD PRESSURE: 77 MMHG | HEIGHT: 72 IN | SYSTOLIC BLOOD PRESSURE: 153 MMHG | WEIGHT: 287.8 LBS | BODY MASS INDEX: 38.98 KG/M2

## 2025-03-05 DIAGNOSIS — T40.2X5A CONSTIPATION DUE TO OPIOID THERAPY: ICD-10-CM

## 2025-03-05 DIAGNOSIS — R19.5 POSITIVE COLORECTAL CANCER SCREENING USING COLOGUARD TEST: ICD-10-CM

## 2025-03-05 DIAGNOSIS — Z79.02 LONG TERM CURRENT USE OF CLOPIDOGREL: Primary | ICD-10-CM

## 2025-03-05 DIAGNOSIS — Z90.3 H/O GASTRIC SLEEVE: ICD-10-CM

## 2025-03-05 DIAGNOSIS — Z12.11 COLON CANCER SCREENING: ICD-10-CM

## 2025-03-05 DIAGNOSIS — K76.0 HEPATIC STEATOSIS: ICD-10-CM

## 2025-03-05 DIAGNOSIS — K59.03 CONSTIPATION DUE TO OPIOID THERAPY: ICD-10-CM

## 2025-03-05 PROCEDURE — 99214 OFFICE O/P EST MOD 30 MIN: CPT | Performed by: PHYSICIAN ASSISTANT

## 2025-03-05 PROCEDURE — G2211 COMPLEX E/M VISIT ADD ON: HCPCS | Performed by: PHYSICIAN ASSISTANT

## 2025-03-05 RX ORDER — POLYETHYLENE GLYCOL 3350, SODIUM SULFATE ANHYDROUS, SODIUM BICARBONATE, SODIUM CHLORIDE, POTASSIUM CHLORIDE 236; 22.74; 6.74; 5.86; 2.97 G/4L; G/4L; G/4L; G/4L; G/4L
4000 POWDER, FOR SOLUTION ORAL ONCE
Qty: 4000 ML | Refills: 0 | Status: SHIPPED | OUTPATIENT
Start: 2025-03-05 | End: 2025-03-05

## 2025-03-05 RX ORDER — SODIUM CHLORIDE, SODIUM LACTATE, POTASSIUM CHLORIDE, CALCIUM CHLORIDE 600; 310; 30; 20 MG/100ML; MG/100ML; MG/100ML; MG/100ML
125 INJECTION, SOLUTION INTRAVENOUS CONTINUOUS
OUTPATIENT
Start: 2025-03-05

## 2025-03-05 RX ORDER — PRUCALOPRIDE 2 MG/1
2 TABLET ORAL DAILY
Qty: 30 TABLET | Refills: 5 | Status: SHIPPED | OUTPATIENT
Start: 2025-03-05

## 2025-03-05 RX ORDER — BISACODYL 5 MG/1
15 TABLET, DELAYED RELEASE ORAL DAILY
Qty: 90 TABLET | Refills: 3 | Status: SHIPPED | OUTPATIENT
Start: 2025-03-05

## 2025-03-05 NOTE — PROGRESS NOTES
Name: Aristeo Hall      : 1960      MRN: [id number]  Encounter Provider: Amanda Dempsey PA-C  Encounter Date: 3/5/2025   Encounter department: [facility]  :  Assessment & Plan  Positive colorectal cancer screening using Cologuard test  I did recommend a colonoscopy for colorectal cancer screening and given + cologuard. I reviewed the indications, risks, benefits and alternatives of a colonoscopy and the patient is willing to proceed.  Given CKD can only have GoLytely prep and advised to sip.  Patient starting on Dulcolax 15 mg daily and prucalopride 2mg daily to improve constipation prior to colonoscopy.  Reports he is unable to tolerate MiraLAX.  Requesting cardiac clearance given cardiac issues/Plavix use as well as neurology clearance prior to procedure with sedation given concussion 2025.    Orders:  •  Ambulatory Referral to Gastroenterology  •  Colonoscopy; Future  •  polyethylene glycol (Golytely) 4000 mL solution; Take 4,000 mL by mouth once for 1 dose Take 4000 mL by mouth once for 1 dose. Use as directed    Colon cancer screening  As above  Orders:  •  Colonoscopy; Future  •  polyethylene glycol (Golytely) 4000 mL solution; Take 4,000 mL by mouth once for 1 dose Take 4000 mL by mouth once for 1 dose. Use as directed    Constipation due to opioid therapy  Has significant constipation with intrathecal pain pump, no improvement with Movantik.  Patient manually disimpact himself once a week and has scant rectal bleeding suspect due to local trauma.  Will prescribe Motegrity 2 mg daily and in the meantime start Dulcolax 15 mg daily.  Advised get plenty of fluid and fiber in his diet.  Once Motegrity started can stop Movantik.  If constipation still an issue patient advised to contact the office  Orders:  •  Prucalopride Succinate 2 MG TABS; Take 2 mg by mouth in the morning  •  bisacodyl (DULCOLAX) 5 mg EC tablet; Take 3 tablets (15 mg  36.8 total) by mouth in the morning    H/O gastric sleeve  Was having reflux and dysphagia after gastric sleeve found to have esophageal dysmotility on opiates eventually underwent Linx procedure June 2024 and doing well.  He is following with bariatric surgery and is planning conversion to ANGEL summer 2025.       Hepatic steatosis  Can be addressed at follow-up       Long term current use of clopidogrel  Have requested cardiac clearance to hold Plavix 1 week prior to colonoscopy.  We discussed the risk of MI/stent thrombosis/CVA, while off Plavix and he expressed his agreement and understanding.     Follow up: colonoscopy, 3m OV Dr. Montano      History of Present Illness   HPI  Aristeo Hall is a 64 y.o. male PMH of DM, CHF, CVA > 5 y ago, CAD stent 2022 on ASA/plavix , myocarditis, ARLEEN, dementia, CKD, opioid dependence with intrathecal pain pump, chronic respiratory failure, copd, depression, BPH, obesity S/P gastric sleeve 11/2019, cerebral aneurysm 2004, concussion 2/18/2025. Pt A & O X 3, consents for self being evaluated in follow up for multiple GI issues + Cologuard.       COLON 2017 pt unsure of results, record unavailable.      January 2024 EGD performed showing irregular Z-line  S/P gastric sleeve, no biopsy taken.  Esophageal manometry ordered patient declined colonoscopy and cologuard ordered. Given Movanik for narcotic induced constipation.    Last saw Dr. Montano November 2024 for reflux/dysphagia with history of gastric sleeve and underwent post magnetic sphincter augmentation (Linx) 7/2024  and was doing well, no longer requiring nausea or or reflux medications.    Saw cardiology 2/25 thought to be in mild CHF spironolactone ordered and carvedilol increases. Stress Test/Echo ordered scheduled for tomorrow.    Saw bariatrics surgery 1/2025 patient chose surgical conversion to ANGEL scheduled for the summer.    11/2024 TSH normal.   12/2025 Cologuard +   2/2025 LFT's normal, CBC WBC  14.8.  3/2025 BNP normal. BMP Cr 0.967 GFR 83.     CT C/A/P w/ contrast 2/2025: pulm nodules unchanged, distention of esophagus. Diffuse heaptic steatosis. S/P GB with Linx.    Reports hard bowel movements once a week without melena requiring manual disimpaction and scant painless rectal bleeding.  Has no improvement on Movantik.  Continues on intrathecal pain pump.  Notes early satiety and nausea if overeats but denies dysphagia or reflux.  Appetite okay and weight stable.  Remains on aspirin and Plavix and is also on Mounjaro. Denies ETOH use.    Objective   /77   Ht 6' (1.829 m)   Wt 131 kg (287 lb 12.8 oz)   BMI 39.03 kg/m²      Physical Exam  Constitutional: Well-developed, no acute distress  HEENT: normocephalic, mucous membranes moist.  Neck: Supple  Skin: warm and dry  Respiratory: coarse breath sounds B/L.  Cardiovascular: Heart is regular rate and rhythm.  Gastrointestinal: obese, Soft, mild upper abd tenderness, nondistended with normal active bowel sounds.  No masses, guarding, rebound.   Rectal Exam: Deferred.  Integumentary: Warm and dry  Extremities:2+  Edema LE, ambulates with rolling walker  Neurologic: Nonfocal. A & O ×3.   Psychiatric: Normal affect.     36.7 36.9 36.3 36.4 36.6 36.9 37 36.5

## 2025-03-05 NOTE — TELEPHONE ENCOUNTER
Our mutual patient is scheduled for a procedure: colonoscopy    On 4/4/2025    With: Dr. Montano    He/She is taking the following blood thinner: Plavix    Can this be stopped 5-7 Days prior to the procedure?    Physician approving Clearance: Dr. Taylor    Last dose will be:      Thank you.

## 2025-03-05 NOTE — TELEPHONE ENCOUNTER
This patient is currently scheduled for an endoscopic procedure on 4/4/2025. Due to patient's medical history, we are requesting clearance from your office. To avoid delay in the patient's care, we request a provider's approval. Thank You!    Please address the following: concussion on 2/18/2025      Does this patient need to be seen by you for clearance?       Is it safe to do the endoscopic procedure in an ambulatory setting? (If no, it will prompt the procedure to be completed at the hospital.)        Please provide any new instructions for us to give to the patient or if you prefer an alternate plan:      _____________________________________________________________________________________________________________________________.      Thank you.

## 2025-03-05 NOTE — PATIENT INSTRUCTIONS
-Start prucalopride 2 mg daily and Dulcolax 15 mg daily for constipation  -Colonoscopy with GoLytely due to CKD sip prep  -Cardiac clearance to hold Plavix 5 to 7 days prior and neurology clearance given recent concussion  -3m OV Dr. Montano

## 2025-03-05 NOTE — TELEPHONE ENCOUNTER
Scheduled date of colonoscopy (as of today):04/04/25  Physician performing colonoscopy:Dusty  Location of colonoscopy:[facility]  Bowel prep reviewed with patient:French  Instructions reviewed with patient by:verbal and folder - DS  Clearances: YES- PLAVIX/ Diabetic on JARDIANCE, MOUNJARO    Needs a NEUROLOGY clearance patient had a concusion on 2/18, sees JOSSELIN LOGAN

## 2025-03-06 ENCOUNTER — HOSPITAL ENCOUNTER (OUTPATIENT)
Dept: NON INVASIVE DIAGNOSTICS | Facility: HOSPITAL | Age: 65
Discharge: HOME | End: 2025-03-06
Attending: INTERNAL MEDICINE
Payer: COMMERCIAL

## 2025-03-06 ENCOUNTER — HOSPITAL ENCOUNTER (OUTPATIENT)
Dept: NUCLEAR MEDICINE | Facility: HOSPITAL | Age: 65
Discharge: HOME | End: 2025-03-06
Attending: INTERNAL MEDICINE
Payer: COMMERCIAL

## 2025-03-06 VITALS
WEIGHT: 288.8 LBS | DIASTOLIC BLOOD PRESSURE: 77 MMHG | BODY MASS INDEX: 39.12 KG/M2 | HEIGHT: 72 IN | SYSTOLIC BLOOD PRESSURE: 153 MMHG

## 2025-03-06 DIAGNOSIS — R07.9 CHEST PAIN, UNSPECIFIED TYPE: ICD-10-CM

## 2025-03-06 LAB
AORTIC ROOT: 3.9 CM
AORTIC VALVE MEAN VELOCITY: 10.1 M/S
ASCENDING AORTA: 3.8 CM
AV AREA BY CONTINUOUS VTI: 5.8 CM2
AV AREA PEAK VELOCITY: 5.7 CM2
AV LVOT MEAN GRADIENT: 3 MMHG
AV LVOT PEAK GRADIENT: 8 MMHG
AV MEAN PRESS GRAD SYS DOP V1V2: 5 MMHG
AV ORIFICE AREA US: 5.76 CM2
AV PEAK GRADIENT: 8 MMHG
AV VELOCITY RATIO: 1.01
DOP CALC AO VTI: 29.9 CM
DOP CALC LVOT AREA: 5.72
DOP CALC LVOT DIAMETER: 2.7 CM
DOP CALC LVOT PEAK VEL VTI: 30.1 CM
DOP CALC LVOT PEAK VEL: 1.39 M/S
DOP CALC LVOT STROKE INDEX: 69.1 ML/M2
DOP CALC LVOT STROKE VOLUME: 172.25
E WAVE DECELERATION TIME: 272 MS
E/A RATIO: 0.57
FRACTIONAL SHORTENING: 31 (ref 28–44)
INTERVENTRICULAR SEPTUM IN DIASTOLE (PARASTERNAL SHORT AXIS VIEW): 1.2 CM
INTERVENTRICULAR SEPTUM: 1.2 CM (ref 0.6–1.1)
LA/AORTA RATIO 2D: 0.95
LAAS-AP2: 22.1 CM2
LAAS-AP4: 23.8 CM2
LEFT ATRIUM SIZE: 3.7 CM
LEFT INTERNAL DIMENSION IN SYSTOLE: 3.1 CM (ref 2.1–4)
LEFT VENTRICLE DIASTOLIC VOLUME (MOD BIPLANE): 103 ML
LEFT VENTRICLE SYSTOLIC VOLUME (MOD BIPLANE): 40 ML
LEFT VENTRICULAR INTERNAL DIMENSION IN DIASTOLE: 4.5 CM (ref 3.5–6)
LEFT VENTRICULAR POSTERIOR WALL IN END DIASTOLE: 1.3 CM
LEFT VENTRICULAR STROKE VOLUME: 52 ML
LV EF BIPLANE MOD: 61 %
LV EF US.2D.A4C+ESTIMATED: 56 %
LVSV (TEICH): 52 ML
MV E'TISSUE VEL-LAT: 9 CM/S
MV E'TISSUE VEL-SEP: 7 CM/S
MV PEAK A VEL: 0.74 M/S
MV PEAK E VEL: 42 CM/S
RIGHT VENTRICLE ID DIMENSION: 5.2 CM
SINOTUBULAR JUNCTION: 2.6 CM
SL CV LV EF: 60
SL CV PED ECHO LEFT VENTRICLE DIASTOLIC VOLUME (MOD BIPLANE) 2D: 90 ML
SL CV PED ECHO LEFT VENTRICLE SYSTOLIC VOLUME (MOD BIPLANE) 2D: 39 ML
SL CV REST NUCLEAR ISOTOPE DOSE: 16.1 MCI
SL CV SINUS OF VALSALVA 2D: 3.4 CM
SL CV STRESS NUCLEAR ISOTOPE DOSE: 49.5 MCI
SPECT HRT GATED+EF W RNC IV: 62 %
STJ: 2.6 CM
STRESS ANGINA INDEX: 0
STRESS BASELINE HR: 64 BPM
STRESS POST PEAK BP: 151 MMHG
STRESS/REST PERFUSION RATIO: 1.04
TRICUSPID ANNULAR PLANE SYSTOLIC EXCURSION: 2.7 CM

## 2025-03-06 PROCEDURE — 93306 TTE W/DOPPLER COMPLETE: CPT

## 2025-03-06 PROCEDURE — 78452 HT MUSCLE IMAGE SPECT MULT: CPT | Performed by: INTERNAL MEDICINE

## 2025-03-06 PROCEDURE — 93018 CV STRESS TEST I&R ONLY: CPT | Performed by: INTERNAL MEDICINE

## 2025-03-06 PROCEDURE — 93016 CV STRESS TEST SUPVJ ONLY: CPT | Performed by: INTERNAL MEDICINE

## 2025-03-06 PROCEDURE — A9502 TC99M TETROFOSMIN: HCPCS

## 2025-03-06 PROCEDURE — 93017 CV STRESS TEST TRACING ONLY: CPT

## 2025-03-06 PROCEDURE — 78452 HT MUSCLE IMAGE SPECT MULT: CPT

## 2025-03-06 PROCEDURE — 93306 TTE W/DOPPLER COMPLETE: CPT | Performed by: INTERNAL MEDICINE

## 2025-03-06 RX ORDER — REGADENOSON 0.08 MG/ML
0.4 INJECTION, SOLUTION INTRAVENOUS ONCE
Status: COMPLETED | OUTPATIENT
Start: 2025-03-06 | End: 2025-03-06

## 2025-03-06 RX ADMIN — REGADENOSON 0.4 MG: 0.08 INJECTION, SOLUTION INTRAVENOUS at 12:36

## 2025-03-06 NOTE — TELEPHONE ENCOUNTER
Procedure rescheduled    Scheduled date of colonoscopy (as of today):04/23/25  Physician performing colonoscopy:Dr Montano  Location of colonoscopy: [facility]  Bowel prep reviewed with patient:French  Instructions given to patient at 03/05/25 office visit  Clearances: YES- PLAVIX/ Diabetic on JARDIANCE, MOUNJARO     Needs a NEUROLOGY clearance patient had a concusion on 2/18, sees JOSSELIN LOGAN

## 2025-03-06 NOTE — TELEPHONE ENCOUNTER
Patient has been rescheduled for 4/23/2025 at [facility]  after his upcoming visit.   We need clearance for 5-7 day Plavix hold and cardiac clearance for decompensated CHF.  Thank you.

## 2025-03-07 ENCOUNTER — APPOINTMENT (OUTPATIENT)
Facility: CLINIC | Age: 65
End: 2025-03-07
Payer: COMMERCIAL

## 2025-03-07 ENCOUNTER — RESULTS FOLLOW-UP (OUTPATIENT)
Dept: BARIATRICS | Facility: CLINIC | Age: 65
End: 2025-03-07

## 2025-03-07 DIAGNOSIS — E55.9 VITAMIN D DEFICIENCY: Primary | ICD-10-CM

## 2025-03-07 DIAGNOSIS — Z98.84 BARIATRIC SURGERY STATUS: ICD-10-CM

## 2025-03-07 DIAGNOSIS — R79.89 HIGH SERUM PARATHYROID HORMONE (PTH): ICD-10-CM

## 2025-03-07 LAB
CHEST PAIN STATEMENT: NORMAL
MAX DIASTOLIC BP: 112 MMHG
MAX PREDICTED HEART RATE: 158 BPM
PROTOCOL NAME: NORMAL
REASON FOR TERMINATION: NORMAL
STRESS POST EXERCISE DUR MIN: 3 MIN
STRESS POST EXERCISE DUR SEC: 1 SEC
STRESS POST PEAK HR: 84 BPM
STRESS POST PEAK SYSTOLIC BP: 169 MMHG
TARGET HR FORMULA: NORMAL
TEST INDICATION: NORMAL

## 2025-03-07 RX ORDER — ERGOCALCIFEROL 1.25 MG/1
50000 CAPSULE, LIQUID FILLED ORAL 2 TIMES WEEKLY
Qty: 24 CAPSULE | Refills: 1 | Status: SHIPPED | OUTPATIENT
Start: 2025-03-10

## 2025-03-07 NOTE — TELEPHONE ENCOUNTER
----- Message from JACLYN Maldonado sent at 3/7/2025  7:57 AM EST -----  Please call pt to let him know to continue with vitamin D supplements and calcium. In addition, continue to take his daily over the counter vitamin D3 as well with the prescription vitamin D that I'll send.   Repeat labs in 3 months.

## 2025-03-09 RX ORDER — OSELTAMIVIR PHOSPHATE 75 MG/1
75 CAPSULE ORAL 2 TIMES DAILY
Qty: 10 CAPSULE | Refills: 0 | OUTPATIENT
Start: 2025-03-09 | End: 2025-03-14

## 2025-03-10 ENCOUNTER — TELEPHONE (OUTPATIENT)
Dept: GASTROENTEROLOGY | Facility: CLINIC | Age: 65
End: 2025-03-10

## 2025-03-10 DIAGNOSIS — T40.2X5A CONSTIPATION DUE TO OPIOID THERAPY: Primary | ICD-10-CM

## 2025-03-10 DIAGNOSIS — K59.03 CONSTIPATION DUE TO OPIOID THERAPY: Primary | ICD-10-CM

## 2025-03-10 NOTE — TELEPHONE ENCOUNTER
Name does not match insurance card        ID number [id number]      When attempted on [employer]  Prior Authorization History  Prucalopride Succinate 2 MG TABS     History    View all authorizations for this medication  Closed   3/10/2025 10:39 AM  Close reason: Cannot find matching patient   Note from payer: Eligibility could not be verified for this patient - patient not found. Please review patient information. - Prescriber details have been updated to match the prescriber directory.   Payer: [employer]  Closed   3/10/2025 10:39 AM  Message source: [employer] Close reason: The  is not the PA processor for this patient and medication combination.

## 2025-03-10 NOTE — TELEPHONE ENCOUNTER
PA for prucalopride DENIED    Reason:(Screenshot if applicable)        Message sent to office clinical pool Yes    Denial letter scanned into Media Yes    Appeal started No (Provider will need to decide if appeal is warranted and send clinical documentation to Prior Authorization Team for initiation.)    **Please follow up with your patient regarding denial and next steps**

## 2025-03-10 NOTE — TELEPHONE ENCOUNTER
Name submitted under as on insurance card     PA for prucalopride 2 mg    SUBMITTED to [place] [employer]       via    [x]CMM-KEY: [id number]      All office notes, labs and other pertaining documents and studies sent. Clinical questions answered. Awaiting determination from insurance company.     Turnaround time for your insurance to make a decision on your Prior Authorization can take 7-21 business days.

## 2025-03-10 NOTE — TELEPHONE ENCOUNTER
Prucalopride denied recommend trying [employer] which was ordered.  Hopefully this gets approved.  Please inform patient.  Thank you

## 2025-03-13 ENCOUNTER — OFFICE VISIT (OUTPATIENT)
Facility: CLINIC | Age: 65
End: 2025-03-13
Payer: COMMERCIAL

## 2025-03-13 DIAGNOSIS — S09.90XA INJURY OF HEAD, INITIAL ENCOUNTER: Primary | ICD-10-CM

## 2025-03-13 PROCEDURE — 97112 NEUROMUSCULAR REEDUCATION: CPT

## 2025-03-13 NOTE — PROGRESS NOTES
Daily Note/Discharge note     Today's date: 3/13/2025  Patient name: Aristeo Hall  : 1960  MRN: [id number]  Referring provider: Terry Cisneros DO  Dx:   Encounter Diagnosis     ICD-10-CM    1. Injury of head, initial encounter  S09.90XA           Start Time: 1715  Stop Time: 1734  Total time in clinic (min): 19 minutes    Subjective: Aristeo reports he is 80% back to normal. The thing he notices the most is having neck pain while its bending. He feels that he can continue his exercises and if he needs to return to therapy he will call back. His headaches are back to baseline. He is completing tasks just as he would've prior to this. He is not looking to pursue therapy.      Objective: See treatment diary below  ABC: 30% unchaged.    Assessment:  Pt is functioning back to baseline. ABC score is the same, no change in this, however notes a drastic improvement in his post concussive symptoms. He will continue with exercises for his neck. Return as needed in future. Pt and PT mutually agree to discharge from skilled care, meeting PLOF.       Plan: pt and PT mutually agree to discharge from skilled PT.     Daily Treatment Diary     Precautions: Falls, spinal cord stimulator  CO-MORBIDITIES: CHF, Hx of stroke, hx of migraine, COPD, ARLEEN, T2DM, neuropathy, memory loss,   HEP ACCESS CODE:       POC Expires Reeval for Medicare to be completed  Unit Limit Auth Expiration Date PT/OT/STVisit Limit   3/29/25 By visit na BOMN 25 BOMN    Completed on visit                    Auth Status DATE 2/25 3/13       Approved Visit # 1 2        Remaining         MANUAL THERAPY         Cervical                                                      THERAPEUTIC EXERCISE HEP         Aerobic          STS          Cervical retraction          Cervical ext          Cervical snags                    NEUROMUSCULAR REEDUCATION           Dynamic balance          Static balance          VORx1          education   AF - continue  physical activity, ABC, cervical exercises                                                                                                           THERAPEUTIC ACTIVITY                                                  GAIT TRAINING                                                  MODALITIES

## 2025-03-14 ENCOUNTER — TELEPHONE (OUTPATIENT)
Dept: UROLOGY | Facility: MEDICAL CENTER | Age: 65
End: 2025-03-14

## 2025-03-15 DIAGNOSIS — R21 RASH: ICD-10-CM

## 2025-03-17 RX ORDER — HYDROCORTISONE 10 MG/ML
LOTION TOPICAL AS NEEDED
Qty: 120 ML | Refills: 1 | Status: SHIPPED | OUTPATIENT
Start: 2025-03-17 | End: 2025-03-24

## 2025-03-20 DIAGNOSIS — R21 RASH: ICD-10-CM

## 2025-03-24 RX ORDER — HYDROCORTISONE 10 MG/ML
LOTION TOPICAL
Qty: 120 ML | Refills: 1 | Status: SHIPPED | OUTPATIENT
Start: 2025-03-24

## 2025-03-24 RX ORDER — HYDROCORTISONE 10 MG/ML
LOTION TOPICAL
Qty: 120 ML | Refills: 1 | Status: SHIPPED | OUTPATIENT
Start: 2025-03-24 | End: 2025-03-24 | Stop reason: SDUPTHER

## 2025-03-25 ENCOUNTER — OFFICE VISIT (OUTPATIENT)
Dept: FAMILY MEDICINE CLINIC | Facility: HOSPITAL | Age: 65
End: 2025-03-25
Payer: COMMERCIAL

## 2025-03-25 VITALS
HEIGHT: 72 IN | SYSTOLIC BLOOD PRESSURE: 148 MMHG | BODY MASS INDEX: 37.71 KG/M2 | WEIGHT: 278.4 LBS | TEMPERATURE: 96.4 F | HEART RATE: 84 BPM | DIASTOLIC BLOOD PRESSURE: 78 MMHG | OXYGEN SATURATION: 97 %

## 2025-03-25 DIAGNOSIS — E11.65 TYPE 2 DIABETES MELLITUS WITH HYPERGLYCEMIA, WITHOUT LONG-TERM CURRENT USE OF INSULIN (HCC): Primary | ICD-10-CM

## 2025-03-25 DIAGNOSIS — Z99.89 USES WALKER: ICD-10-CM

## 2025-03-25 DIAGNOSIS — J44.9 CHRONIC OBSTRUCTIVE PULMONARY DISEASE, UNSPECIFIED COPD TYPE (HCC): ICD-10-CM

## 2025-03-25 DIAGNOSIS — J45.50 SEVERE PERSISTENT ASTHMA WITHOUT COMPLICATION: ICD-10-CM

## 2025-03-25 DIAGNOSIS — E11.3293 TYPE 2 DIABETES MELLITUS WITH BOTH EYES AFFECTED BY MILD NONPROLIFERATIVE RETINOPATHY WITHOUT MACULAR EDEMA, WITHOUT LONG-TERM CURRENT USE OF INSULIN (HCC): ICD-10-CM

## 2025-03-25 DIAGNOSIS — G47.33 OBSTRUCTIVE SLEEP APNEA SYNDROME: ICD-10-CM

## 2025-03-25 DIAGNOSIS — F33.1 MAJOR DEPRESSIVE DISORDER, RECURRENT, MODERATE (HCC): ICD-10-CM

## 2025-03-25 DIAGNOSIS — F11.20 OPIOID DEPENDENCE, CONTINUOUS (HCC): ICD-10-CM

## 2025-03-25 DIAGNOSIS — E66.812 CLASS 2 SEVERE OBESITY DUE TO EXCESS CALORIES WITH SERIOUS COMORBIDITY AND BODY MASS INDEX (BMI) OF 38.0 TO 38.9 IN ADULT (HCC): ICD-10-CM

## 2025-03-25 DIAGNOSIS — E66.01 CLASS 2 SEVERE OBESITY DUE TO EXCESS CALORIES WITH SERIOUS COMORBIDITY AND BODY MASS INDEX (BMI) OF 38.0 TO 38.9 IN ADULT (HCC): ICD-10-CM

## 2025-03-25 PROBLEM — J45.51 SEVERE PERSISTENT ASTHMA WITH (ACUTE) EXACERBATION: Status: ACTIVE | Noted: 2025-03-25

## 2025-03-25 PROCEDURE — G2211 COMPLEX E/M VISIT ADD ON: HCPCS | Performed by: NURSE PRACTITIONER

## 2025-03-25 PROCEDURE — 99214 OFFICE O/P EST MOD 30 MIN: CPT | Performed by: NURSE PRACTITIONER

## 2025-03-25 RX ORDER — HYDROMORPHONE HCL IN WATER/PF 6 MG/30 ML
0.2 PATIENT CONTROLLED ANALGESIA SYRINGE INTRAVENOUS
COMMUNITY

## 2025-03-25 NOTE — ASSESSMENT & PLAN NOTE
Lab Results   Component Value Date    HGBA1C 6.3 (H) 11/14/2024   Eye exam scheduled for the end of this week.     Orders:    Albumin / creatinine urine ratio; Future    Ambulatory referral to Endocrinology; Future

## 2025-03-25 NOTE — ASSESSMENT & PLAN NOTE
Depression Screening Follow-up Plan: Patient's depression screening was positive with a PHQ-9 score of 18. Patient with underlying depression and was advised to continue current medications as prescribed.   We discussed increasing Cymbalta (prescribed for neuropathic pain) , however he is declining and feels his depression symptoms are manageable.

## 2025-03-25 NOTE — ASSESSMENT & PLAN NOTE
He is not able to tolerate CPAP.   He will be having another sleep study through [facility] tomorrow with hope of qualifying for Inspire.

## 2025-03-25 NOTE — PROGRESS NOTES
Name: Aristeo Hall      : 1960      MRN: [id number]  Encounter Provider: JACLYN Ospina  Encounter Date: 3/25/2025   Encounter department: [facility] [address]   :  Assessment & Plan  Type 2 diabetes mellitus with hyperglycemia, without long-term current use of insulin (HCC)    Lab Results   Component Value Date    HGBA1C 6.3 (H) 2024   Controlled.   Managed by endo.   Off jardiance and on mounjaro.   Urine microalbumin ordered. Unable to give urine in office.   Eye exam scheduled end of this week.   UTD with foot exam.       Orders:    Albumin / creatinine urine ratio; Future    Ambulatory referral to Endocrinology; Future    Type 2 diabetes mellitus with both eyes affected by mild nonproliferative retinopathy without macular edema, without long-term current use of insulin (HCC)    Lab Results   Component Value Date    HGBA1C 6.3 (H) 2024   Eye exam scheduled for the end of this week.     Orders:    Albumin / creatinine urine ratio; Future    Ambulatory referral to Endocrinology; Future    Major depressive disorder, recurrent, moderate (HCC)  Depression Screening Follow-up Plan: Patient's depression screening was positive with a PHQ-9 score of 18. Patient with underlying depression and was advised to continue current medications as prescribed.   We discussed increasing Cymbalta (prescribed for neuropathic pain) , however he is declining and feels his depression symptoms are manageable.        Chronic obstructive pulmonary disease, unspecified COPD type (HCC)  Managed by pulmonary.   Controlled with Advair and Trelegy.        Severe persistent asthma without complication  Managed by pulmonary.   Stable.        Obstructive sleep apnea syndrome  He is not able to tolerate CPAP.   He will be having another sleep study through [facility] tomorrow with hope of qualifying for Inspire.        Class 2 severe obesity due to excess calories with serious comorbidity and  body mass index (BMI) of 38.0 to 38.9 in adult (HCC)  Currently on Mounjaro to treat DM.   Positive effect on weight.        Opioid dependence, continuous (HCC)  With dilaudid pain pump managed by pain medicine.        Uses walker  Continues to use assistive device to ambulate              History of Present Illness   Feels depressed. Fell last month and had a concussion. Feels depression is controlled. Completed concussion PT.   Breathing is ok on Advair and Trelegy. Managed by pulmonary  Neuropathic pain is bad. Pain management treats this. On gabapentin and Cymbalta. Dilaudid pain pump for regional pain syndrome. Also with spinal stimulator.   No longer on jardiance. States there was drug interaction with topamax. He is now on mounjaro. Sees good weight response on 7.5 mf dose. Managed by endo. Upcoming appointment end of next month. Has eye appointment end of this week.       Review of Systems   Constitutional:  Positive for appetite change and fatigue. Negative for unexpected weight change.   Eyes:  Negative for visual disturbance.   Respiratory:  Negative for shortness of breath.    Cardiovascular:  Negative for chest pain, palpitations and leg swelling.   Musculoskeletal:  Positive for back pain. Negative for myalgias.   Neurological:  Positive for headaches. Negative for dizziness, weakness, light-headedness and numbness.   Psychiatric/Behavioral:  Positive for decreased concentration, dysphoric mood and sleep disturbance. Negative for suicidal ideas.        Objective   /78 (Patient Position: Sitting, Cuff Size: Standard)   Pulse 84   Temp (!) 96.4 °F (35.8 °C) (Tympanic)   Ht 6' (1.829 m)   Wt 126 kg (278 lb 6.4 oz)   SpO2 97%   BMI 37.76 kg/m²      Physical Exam  Vitals reviewed.   Constitutional:       Appearance: Normal appearance. He is obese.   Cardiovascular:      Rate and Rhythm: Normal rate and regular rhythm.      Heart sounds: Normal heart sounds. No murmur heard.  Pulmonary:       Effort: Pulmonary effort is normal.      Breath sounds: Normal breath sounds.   Skin:     General: Skin is warm and dry.   Neurological:      Mental Status: He is alert and oriented to person, place, and time.   Psychiatric:         Mood and Affect: Mood normal.         Behavior: Behavior normal.         Thought Content: Thought content normal.         Judgment: Judgment normal.

## 2025-03-25 NOTE — ASSESSMENT & PLAN NOTE
Lab Results   Component Value Date    HGBA1C 6.3 (H) 11/14/2024   Controlled.   Managed by endo.   Off jardiance and on mounjaro.   Urine microalbumin ordered. Unable to give urine in office.   Eye exam scheduled end of this week.   UTD with foot exam.       Orders:    Albumin / creatinine urine ratio; Future    Ambulatory referral to Endocrinology; Future

## 2025-03-26 ENCOUNTER — APPOINTMENT (OUTPATIENT)
Dept: LAB | Facility: HOSPITAL | Age: 65
End: 2025-03-26
Payer: COMMERCIAL

## 2025-03-26 ENCOUNTER — OFFICE VISIT (OUTPATIENT)
Dept: CARDIOLOGY CLINIC | Facility: CLINIC | Age: 65
End: 2025-03-26
Payer: COMMERCIAL

## 2025-03-26 VITALS
HEART RATE: 70 BPM | SYSTOLIC BLOOD PRESSURE: 126 MMHG | DIASTOLIC BLOOD PRESSURE: 78 MMHG | WEIGHT: 278 LBS | HEIGHT: 72 IN | BODY MASS INDEX: 37.65 KG/M2

## 2025-03-26 DIAGNOSIS — I50.32 CHRONIC DIASTOLIC CONGESTIVE HEART FAILURE (HCC): ICD-10-CM

## 2025-03-26 DIAGNOSIS — E78.00 PURE HYPERCHOLESTEROLEMIA: ICD-10-CM

## 2025-03-26 DIAGNOSIS — I25.118 CORONARY ARTERY DISEASE OF NATIVE ARTERY OF NATIVE HEART WITH STABLE ANGINA PECTORIS (HCC): ICD-10-CM

## 2025-03-26 DIAGNOSIS — R79.89 HIGH SERUM PARATHYROID HORMONE (PTH): ICD-10-CM

## 2025-03-26 DIAGNOSIS — Z98.84 BARIATRIC SURGERY STATUS: ICD-10-CM

## 2025-03-26 DIAGNOSIS — E11.65 TYPE 2 DIABETES MELLITUS WITH HYPERGLYCEMIA, WITHOUT LONG-TERM CURRENT USE OF INSULIN (HCC): ICD-10-CM

## 2025-03-26 DIAGNOSIS — E55.9 VITAMIN D DEFICIENCY: ICD-10-CM

## 2025-03-26 DIAGNOSIS — E11.40 TYPE 2 DIABETES MELLITUS WITH DIABETIC NEUROPATHY, WITHOUT LONG-TERM CURRENT USE OF INSULIN (HCC): ICD-10-CM

## 2025-03-26 DIAGNOSIS — E11.3293 TYPE 2 DIABETES MELLITUS WITH BOTH EYES AFFECTED BY MILD NONPROLIFERATIVE RETINOPATHY WITHOUT MACULAR EDEMA, WITHOUT LONG-TERM CURRENT USE OF INSULIN (HCC): ICD-10-CM

## 2025-03-26 LAB
25(OH)D3 SERPL-MCNC: 15.3 NG/ML (ref 30–100)
ALBUMIN SERPL BCG-MCNC: 3.9 G/DL (ref 3.5–5)
ALP SERPL-CCNC: 95 U/L (ref 34–104)
ALT SERPL W P-5'-P-CCNC: 21 U/L (ref 7–52)
ANION GAP SERPL CALCULATED.3IONS-SCNC: 6 MMOL/L (ref 4–13)
AST SERPL W P-5'-P-CCNC: 22 U/L (ref 13–39)
BILIRUB SERPL-MCNC: 0.5 MG/DL (ref 0.2–1)
BUN SERPL-MCNC: 12 MG/DL (ref 5–25)
CALCIUM SERPL-MCNC: 9.3 MG/DL (ref 8.4–10.2)
CHLORIDE SERPL-SCNC: 101 MMOL/L (ref 96–108)
CHOLEST SERPL-MCNC: 166 MG/DL (ref ?–200)
CO2 SERPL-SCNC: 33 MMOL/L (ref 21–32)
CREAT SERPL-MCNC: 1.12 MG/DL (ref 0.6–1.3)
CREAT UR-MCNC: 249 MG/DL
EST. AVERAGE GLUCOSE BLD GHB EST-MCNC: 123 MG/DL
FERRITIN SERPL-MCNC: 47 NG/ML (ref 24–336)
GFR SERPL CREATININE-BSD FRML MDRD: 70 ML/MIN/1.73SQ M
GLUCOSE P FAST SERPL-MCNC: 113 MG/DL (ref 65–99)
HBA1C MFR BLD: 5.9 %
HDLC SERPL-MCNC: 33 MG/DL
IRON SATN MFR SERPL: 11 % (ref 15–50)
IRON SERPL-MCNC: 34 UG/DL (ref 50–212)
LDLC SERPL CALC-MCNC: 108 MG/DL (ref 0–100)
MICROALBUMIN UR-MCNC: 27.7 MG/L
MICROALBUMIN/CREAT 24H UR: 11 MG/G CREATININE (ref 0–30)
POTASSIUM SERPL-SCNC: 3.8 MMOL/L (ref 3.5–5.3)
PROT SERPL-MCNC: 8.2 G/DL (ref 6.4–8.4)
PTH-INTACT SERPL-MCNC: 165.2 PG/ML (ref 12–88)
SODIUM SERPL-SCNC: 140 MMOL/L (ref 135–147)
TIBC SERPL-MCNC: 309.4 UG/DL (ref 250–450)
TRANSFERRIN SERPL-MCNC: 221 MG/DL (ref 203–362)
TRIGL SERPL-MCNC: 126 MG/DL (ref ?–150)
TSH SERPL DL<=0.05 MIU/L-ACNC: 1.87 UIU/ML (ref 0.45–4.5)
UIBC SERPL-MCNC: 275 UG/DL (ref 155–355)

## 2025-03-26 PROCEDURE — 84443 ASSAY THYROID STIM HORMONE: CPT

## 2025-03-26 PROCEDURE — 99214 OFFICE O/P EST MOD 30 MIN: CPT | Performed by: PHYSICIAN ASSISTANT

## 2025-03-26 PROCEDURE — 83970 ASSAY OF PARATHORMONE: CPT

## 2025-03-26 PROCEDURE — 82728 ASSAY OF FERRITIN: CPT

## 2025-03-26 PROCEDURE — 36415 COLL VENOUS BLD VENIPUNCTURE: CPT

## 2025-03-26 PROCEDURE — 83550 IRON BINDING TEST: CPT

## 2025-03-26 PROCEDURE — 80053 COMPREHEN METABOLIC PANEL: CPT

## 2025-03-26 PROCEDURE — 83036 HEMOGLOBIN GLYCOSYLATED A1C: CPT

## 2025-03-26 PROCEDURE — 80061 LIPID PANEL: CPT

## 2025-03-26 PROCEDURE — 82570 ASSAY OF URINE CREATININE: CPT

## 2025-03-26 PROCEDURE — 82306 VITAMIN D 25 HYDROXY: CPT

## 2025-03-26 PROCEDURE — 83540 ASSAY OF IRON: CPT

## 2025-03-26 PROCEDURE — 82043 UR ALBUMIN QUANTITATIVE: CPT

## 2025-03-26 RX ORDER — PROCHLORPERAZINE 25 MG/1
SUPPOSITORY RECTAL
Qty: 1 EACH | Refills: 0 | Status: SHIPPED | OUTPATIENT
Start: 2025-03-26

## 2025-03-26 NOTE — TELEPHONE ENCOUNTER
Patient called to request a refill for their Continue Glucose Transmitter advised a refill was requested on 3/26/25 and is pending approval. Patient verbalized understanding and is in agreement.     Does the patient have enough for 3 days?   [] Yes   [x] No - Send as HP to POD

## 2025-03-26 NOTE — PROGRESS NOTES
Cardiology Office Follow Up  Aristeo Hall  1960  [id number]      ASSESSMENT:  Chronic HFpEF  CAD  Stable angina  CKD 2  ARLEEN refuses CPAP  Restrictive lung disease    PLAN:  Aristeo's dyspnea continues despite weight loss and nearly resolved LE edema.  His volume status is reasonable at present time.  It does not appear that his residual dyspnea is secondary to CHF and does not have ischemic substrate noted on his recent stress test.  Echo did not show any significant abnormalities to explain his symptoms.  Could be secondary to restrictive lung disease from habitus, poor conditioning and untreated ARLEEN contributing.  Recommend pulmonology follow-up. Will continue diuretics at current doses, encouraged on maintaining daily weights and take extra 40 mg Lasix with any weight gain above 3 pounds from his new baseline weight.  Advised on salt and fluid restrictions. Leg compression, elevation.  Activity to tolerance.  Denies any anginal equivalent since his last OV with the increase in carvedilol and taking Ranexa 1 g twice daily. Continue this regimen.  Continue atorvastatin 80 mg daily, Plavix 75 mg daily, losartan 50 mg daily  RTO in 3 to 4 months or sooner if needed.    Interval History/ HPI:   Aristeo Hall is a 64-year-old male with past medical history noted above.   Follows with Dr Taylor, last OV 2/4/2025. Presents for 1 month follow-up visit to review recent testing. C/O dyspnea with exertion. Appeared volume overloaded and noted on Aldactone 25 mg daily, furosemide 40 mg daily with extra 40 mg as needed.  Coreg also increased to 25 mg twice daily.  Notes of nearly resolved LE edema with this. Also has been wearing compression socks and elevating his legs.  Since his last OV he has lost approximately 13 pounds. He was sent for TTE on 3/6 showing preserved LVEF 60%, mild RV dilation, AV sclerosis, mild aortic root dilation and form MPI which did not show evidence of ischemia. BNP within  normal limits  -- 60.  He however remains with dyspnea essentially unchanged since his prior OV.  He fully denies any overt chest pain, heaviness or pressure. Has not needed SL NTG recently. He has upcoming appointment with pulmonology for further evaluation.  Lung sounds clear to auscultation bilaterally with no evidence of JVD/JVP and trivial LE edema today.    Vitals:  126/78  70  278 LBS    Past Medical History:   Diagnosis Date    Acute on chronic diastolic congestive heart failure ([facility])     Altered gait     Alzheimer disease ([facility])     per patients,,early onset     Aneurysm ([facility])     Angina pectoris ([facility])     Anxiety     Arthritis [id number]    Brain aneurysm     coils placed    Bronchiectasis ([facility]) [id number]    Cardiac disease     Chest pain 01/13/2016    Chronic bronchitis ([facility]) [id number]    Chronic kidney disease 1016    Chronic pain     back/ right groin and rle- has morphine pump    Cluster headache     Constipation     COPD (chronic obstructive pulmonary disease) ([facility]) [id number]    Coronary artery disease 2003    Decubital ulcer     sacral decub-occured 5/2019-sees wound care/debide in OR today 6/6/2019    Dependent on walker for ambulation     w/c for long distance    Depression     Diabetes mellitus ([facility])     insulin dependent -- pt states no longer dependent on insulin    Difficulty walking     Dizziness     occ    Dysphagia     Enlarged prostate     Esophageal stricture In file    Esophageal varices ([facility])     Fall     Fall at home 05/03/2019    GERD (gastroesophageal reflux disease) [id number]    Headache, tension-type     Heart failure ([facility])     Hiatal hernia     Hx of gastric bypass 11/19/2018    states Nov 2019    Hypercholesterolemia     Hypertension 2003    Ingrown toenail     Memory loss     MI (myocardial infarction) ([facility])     2017- stents x2    Migraine     Morbid obesity ([facility])     gastric bypass sleeve 11/2018-wt loss 125 lb    Myocardial infarction ([facility]) 2003    Neuropathy     Obesity 2003     Opioid dependence, continuous ([facility]) 11/06/2017    Oxygen dependent     Q HS  2LPM with CPAP and prn during day 2-3 LPM  ( pt states no longer needs oxygen )    Peripheral neuropathy     Pressure injury of skin     Pressure injury of skin of sacral region 06/03/2019    Added automatically from request for surgery [id number]    Renal disorder     Shortness of breath     Skin abnormality     sacral wound - covered with pad    Sleep apnea     couldnt tolerate CPAP    Sleep apnea, obstructive 2016    Stented coronary artery     Stroke ([facility]) 2003    vision loss b/l  2005, residual R leg weakness    Type 2 diabetes mellitus with diabetic neuropathy, with long-term current use of insulin ([facility]) 10/18/2019    Type 2 diabetes mellitus with renal complication ([facility])     insulin dependent    Type 2 diabetes mellitus, with long-term current use of insulin ([facility]) 10/11/2017    Transitioned From: Diabetes mellitus type 2, uncontrolled    Urinary frequency     Use of cane as ambulatory aid     Vision loss     Wears dentures     Wears glasses     Wears glasses     Wheelchair dependent     states uses walker & cane     Social History     Socioeconomic History    Marital status: /Civil Union     Spouse name: Reva    Number of children: 5    Years of education: Not on file    Highest education level: GED or equivalent   Occupational History    Not on file   Tobacco Use    Smoking status: Never    Smokeless tobacco: Never   Vaping Use    Vaping status: Never Used   Substance and Sexual Activity    Alcohol use: Not Currently    Drug use: Not Currently     Types: Marijuana, Morphine     Comment: Medical card is for CBD cream . states no THC use    Sexual activity: Not Currently     Partners: Female   Other Topics Concern    Not on file   Social History Narrative    Not on file     Social Drivers of Health     Financial Resource Strain: High Risk (9/19/2023)    Overall Financial Resource Strain (CARDIA)     Difficulty of Paying Living  Expenses: Very hard   Food Insecurity: Food Insecurity Present (11/14/2024)    Hunger Vital Sign     Worried About Running Out of Food in the Last Year: Often true     Ran Out of Food in the Last Year: Often true   Transportation Needs: No Transportation Needs (11/14/2024)    PRAPARE - Transportation     Lack of Transportation (Medical): No     Lack of Transportation (Non-Medical): No   Physical Activity: Inactive (9/6/2019)    Exercise Vital Sign     Days of Exercise per Week: 0 days     Minutes of Exercise per Session: 0 min   Stress: Stress Concern Present (9/6/2019)    [employer] - Occupational Stress Questionnaire     Feeling of Stress : Very much   Social Connections: Moderately Isolated (9/6/2019)    Social Connection and Isolation Panel [NHANES]     Frequency of Communication with Friends and Family: Never     Frequency of Social Gatherings with Friends and Family: Twice a week     Attends Shinto Services: 1 to 4 times per year     Active Member of Clubs or Organizations: No     Attends Club or Organization Meetings: Never     Marital Status:    Intimate Partner Violence: Not At Risk (9/6/2019)    Humiliation, Afraid, Rape, and Kick questionnaire     Fear of Current or Ex-Partner: No     Emotionally Abused: No     Physically Abused: No     Sexually Abused: No   Housing Stability: Low Risk  (11/14/2024)    Housing Stability Vital Sign     Unable to Pay for Housing in the Last Year: No     Number of Times Moved in the Last Year: 1     Homeless in the Last Year: No      Family History   Problem Relation Age of Onset    Diabetes unspecified Mother     Diabetes Mother     Migraines Mother     Heart attack Father     Heart disease Father     Hypertension Father     Stroke Father     Neuropathy Father     Diabetes unspecified Brother     Depression Brother     Mental illness Brother     COPD Brother     Drug abuse Brother     Bipolar disorder Brother     Diabetes unspecified  Brother     Diabetes unspecified Maternal Grandmother     Diabetes unspecified Paternal Grandmother     Diabetes unspecified Paternal Uncle     ADD / ADHD Cousin     Colon cancer Neg Hx     Colon polyps Neg Hx      Past Surgical History:   Procedure Laterality Date    BACK SURGERY      BRAIN SURGERY      CARDIAC CATHETERIZATION      with stents    CARDIAC CATHETERIZATION N/A 08/18/2023    Procedure: Cardiac Coronary Angiogram;  Surgeon: Horacio Weiner MD;  Location: [facility];  Service: Cardiology    CEREBRAL ANEURYSM REPAIR      with coils    COLONOSCOPY      ESOPHAGOGASTRODUODENOSCOPY N/A 07/01/2016    Procedure: ESOPHAGOGASTRODUODENOSCOPY (EGD);  Surgeon: Reno Christiansen MD;  Location: [facility];  Service:     GASTRIC BYPASS  11/19/2018    HERNIA REPAIR  2015    HIATAL HERNIA REPAIR      INFUSION PUMP IMPLANTATION Left     morphine    INTRATHECAL PUMP IMPLANTATION Left 07/09/2020    Procedure: REVISION INTRATHECAL PAIN PUMP POCKET, LEFT ABDOMEN;  Surgeon: Homero Cho MD;  Location: [facility];  Service: Neurosurgery    KNEE ARTHROSCOPY Right     KNEE ARTHROSCOPY Right     PERONEAL NERVE DECOMPRESSION Right     IL DEBRIDEMENT OPEN WOUND FIRST 20 SQ CM/< N/A 06/06/2019    Procedure: EXCISIONAL DEBRIDEMENT OF SACRAL DECUBITUS ULCER;  Surgeon: Siddhartha Omer MD;  Location[facility];  Service: General    IL ESOPHAGOGASTRODUODENOSCOPY TRANSORAL DIAGNOSTIC N/A 02/27/2017    Procedure: ESOPHAGOGASTRODUODENOSCOPY (EGD);  Surgeon: Reno Christiansen MD;  [facility]: [facility];  Service: Gastroenterology    IL ESOPHAGOGASTRODUODENOSCOPY TRANSORAL DIAGNOSTIC N/A 08/23/2018    Procedure: ESOPHAGOGASTRODUODENOSCOPY (EGD) with biopsy;  Surgeon: Reno Christiansen MD;  [facility]: [facility];  Service: Gastroenterology    IL IMPLTJ/RPLCMT ITHCL/EDRL DRUG NFS PRGRBL PUMP Left 10/13/2020    Procedure: EXPLORATION OF INTRATHECAL PAIN PUMP SYSTEM INTEGRITY FOR POSSIBLE REPLACEMENT OF CATHETER AND PUMP.;  Surgeon: Homero Cho MD;   Location: [facility];  Service: Neurosurgery    NM IMPLTJ/RPLCMT ITHCL/EDRL DRUG NFS SUBQ RSVR N/A 01/19/2017    Procedure: REMOVAL / EXCHANGE INTRATHECAL PAIN PUMP;  Surgeon: Que Leonard MD;  [facility];  Service: Orthopedics    NM IMPLTJ/RPLCMT ITHCL/EDRL DRUG NFS SUBQ RSVR N/A 05/16/2016    Procedure: REPLACEMENT AND PROGRAM PUMP ;  Surgeon: Que Leonard MD;  [facility];  Service: Orthopedics    NM LAPS RPR PARAESPHGL HRNA INCL FUNDPLSTY W/MESH N/A 07/23/2024    Procedure: REPAIR HERNIA PARAESOPHAGEAL  W ROBOTICS & MAGNETIC SPINCTER AUGMENTATION DEVISE;  Surgeon: Mansi Mauro MD;  [facility];  Service: Bariatrics    NM PRQ IMPLTJ NSTIM ELECTRODE ARRAY EPIDURAL Right 03/17/2021    Procedure: INSERTION THORACIC DORSAL COLUMN SPINAL CORD STIMULATOR PERCUTANEOUS W IMPLANTABLE PULSE GENERATOR, RIGHT;  Surgeon: Homero Cho MD;  [facility]: [facility];  Service: Neurosurgery       Current Outpatient Medications:     albuterol (2.5 mg/3 mL) 0.083 % nebulizer solution, USE 1 VIAL IN NEBULIZER EVERY 6 HOURS AS NEEDED FOR WHEEZING OR SHORTNESS OF BREATH, Disp: 720 mL, Rfl: 2    albuterol (Ventolin HFA) 90 mcg/act inhaler, Inhale 2 puffs every 6 (six) hours as needed for wheezing, Disp: 18 g, Rfl: 3    ammonium lactate (LAC-HYDRIN) 12 % cream, APPLY  CREAM TOPICALLY TO AFFECTED AREA TWICE DAILY, Disp: 385 g, Rfl: 5    aspirin 81 mg chewable tablet, Chew 1 tablet (81 mg total) daily, Disp: 90 tablet, Rfl: 3    atorvastatin (LIPITOR) 80 mg tablet, Take 1 tablet (80 mg total) by mouth daily after dinner, Disp: 90 tablet, Rfl: 3    baclofen 10 mg tablet, Take 10 mg by mouth 3 (three) times a day, Disp: , Rfl:     bisacodyl (DULCOLAX) 5 mg EC tablet, Take 3 tablets (15 mg total) by mouth in the morning, Disp: 90 tablet, Rfl: 3    busPIRone (BUSPAR) 5 mg tablet, Take 1 tablet (5 mg total) by mouth 2 (two) times a day, Disp: 180 tablet, Rfl: 1    CALCIUM PO, Take 1 tablet by mouth daily, Disp: ,  Rfl:     carvedilol (COREG) 25 mg tablet, Take 1 tablet (25 mg total) by mouth 2 (two) times a day with meals, Disp: 180 tablet, Rfl: 3    clopidogrel (PLAVIX) 75 mg tablet, Take 1 tablet (75 mg total) by mouth daily, Disp: 90 tablet, Rfl: 3    Continuous Glucose Sensor (Dexcom G6 Sensor) MISC, 3 PACK SENSOR FOR CONTINUOUS GLUCOSE MONITORING, Disp: 9 each, Rfl: 1    Continuous Glucose Transmitter (Dexcom G6 Transmitter) MISC, 1 TRANSMITTED EVERY 3 MONTHS FOR CONTINUOUS GLUCOSE MONITORING, Disp: 1 each, Rfl: 0    docusate sodium (COLACE) 100 mg capsule, Take 1 capsule (100 mg total) by mouth 2 (two) times a day, Disp: 180 capsule, Rfl: 3    DULoxetine (Cymbalta) 30 mg delayed release capsule, Take 1 capsule (30 mg total) by mouth daily, Disp: 30 capsule, Rfl: 3    ergocalciferol (VITAMIN D2) 50,000 units, Take 1 capsule (50,000 Units total) by mouth 2 (two) times a week, Disp: 24 capsule, Rfl: 1    Fluticasone-Salmeterol (Advair) 500-50 mcg/dose inhaler, Inhale 1 puff daily Rinse mouth after use, Disp: 180 blister, Rfl: 1    fluticasone-umeclidinium-vilanterol (Trelegy Ellipta) 200-62.5-25 mcg/actuation AEPB inhaler, Inhale 1 puff daily Rinse mouth after use., Disp: 60 blister, Rfl: 3    folic acid (FOLVITE) 1 mg tablet, Take 1 tablet by mouth once daily, Disp: 90 tablet, Rfl: 1    furosemide (LASIX) 40 mg tablet, Take once in AM daily. Take once daily in PM PRN weight gain, edema., Disp: 180 tablet, Rfl: 3    gabapentin (NEURONTIN) 300 mg capsule, Take 1 capsule (300 mg total) by mouth daily as needed (headache), Disp: 30 capsule, Rfl: 5    gabapentin (NEURONTIN) 800 mg tablet, Take 1 tablet (800 mg total) by mouth 4 (four) times a day, Disp: 360 tablet, Rfl: 1    hydrocortisone 1 % lotion, Apply to rash twice/day as needed. Do not exceed use for longer than 2 weeks., Disp: 120 mL, Rfl: 1    HYDROmorphone HCl (Dilaudid) 0.2 MG/ML SOLN, Inject 0.2 mg under the skin, Disp: , Rfl:     lidocaine (LIDODERM) 5 %, Apply 1  patch topically daily back, Disp: , Rfl:     losartan (COZAAR) 50 mg tablet, Take 1 tablet (50 mg total) by mouth daily, Disp: 100 tablet, Rfl: 3    methocarbamol (ROBAXIN) 750 mg tablet, TAKE 1 TABLET (750 MG TOTAL) BY MOUTH EVERY 6 (SIX) HOURS AS NEEDED FOR MUSCLE SPASMS, Disp: 120 tablet, Rfl: 0    Naldemedine Tosylate 0.2 MG TABS, Take 0.2 mg by mouth in the morning, Disp: 30 tablet, Rfl: 2    nitroglycerin (NITROSTAT) 0.4 mg SL tablet, DISSOLVE ONE TABLET UNDER THE TONGUE EVERY 5 MINUTES AS NEEDED FOR CHEST PAIN.  DO NOT EXCEED A TOTAL OF 3 DOSES IN 15 MINUTES, Disp: 25 tablet, Rfl: 0    nystatin (MYCOSTATIN) cream, Apply 2 g (1 Application total) topically 2 (two) times a day, Disp: 15 g, Rfl: 0    Prucalopride Succinate 2 MG TABS, Take 2 mg by mouth in the morning, Disp: 30 tablet, Rfl: 5    ranolazine (RANEXA) 1000 MG SR tablet, Take 1 tablet (1,000 mg total) by mouth 2 (two) times a day, Disp: 180 tablet, Rfl: 3    spironolactone (ALDACTONE) 25 mg tablet, Take 1 tablet (25 mg total) by mouth daily, Disp: 90 tablet, Rfl: 3    tamsulosin (FLOMAX) 0.4 mg, TAKE 1 CAPSULE BY MOUTH EVERY DAY WITH DINNER, Disp: 90 capsule, Rfl: 1    Tirzepatide (Mounjaro) 7.5 MG/0.5ML SOAJ, Once a week, Disp: 2 mL, Rfl: 6    topiramate (Topamax) 50 MG tablet, Take 1 tablet (50 mg total) by mouth every 12 (twelve) hours, Disp: 60 tablet, Rfl: 5    traZODone (DESYREL) 50 mg tablet, Take 1 tablet (50 mg total) by mouth 2 (two) times a day, Disp: 180 tablet, Rfl: 1    vitamin B-12 (VITAMIN B-12) 1,000 mcg tablet, Take 1 tablet (1,000 mcg total) by mouth daily, Disp: 90 tablet, Rfl: 3      Review of Systems:  Review of Systems   Constitutional:  Negative for appetite change, chills, diaphoresis, fatigue and fever.   Respiratory:  Positive for shortness of breath. Negative for cough and chest tightness.    Cardiovascular:  Positive for leg swelling. Negative for chest pain and palpitations.   Gastrointestinal:  Negative for diarrhea,  nausea and vomiting.   Endocrine: Negative for cold intolerance and heat intolerance.   Genitourinary:  Negative for difficulty urinating, dysuria and enuresis.   Musculoskeletal:  Negative for arthralgias, back pain and gait problem.   Allergic/Immunologic: Negative for environmental allergies and food allergies.   Neurological:  Negative for dizziness, facial asymmetry and headaches.   Hematological:  Negative for adenopathy. Does not bruise/bleed easily.   Psychiatric/Behavioral:  Negative for agitation, behavioral problems and confusion.      Physical Exam:  Physical Exam  Constitutional:       Appearance: He is well-developed.   HENT:      Right Ear: External ear normal.      Left Ear: External ear normal.   Eyes:      Pupils: Pupils are equal, round, and reactive to light.   Cardiovascular:      Rate and Rhythm: Normal rate and regular rhythm.      Heart sounds: Normal heart sounds. No murmur heard.     No friction rub. No gallop.   Pulmonary:      Effort: Pulmonary effort is normal.      Breath sounds: Normal breath sounds.      Comments: Decreased breath sounds bilaterally  Abdominal:      Palpations: Abdomen is soft.   Musculoskeletal:         General: Normal range of motion.      Cervical back: Normal range of motion.      Right lower leg: Edema present.      Left lower leg: Edema present.   Skin:     General: Skin is warm and dry.   Neurological:      Mental Status: He is alert and oriented to person, place, and time.      Deep Tendon Reflexes: Reflexes are normal and symmetric.   Psychiatric:         Behavior: Behavior normal.         Thought Content: Thought content normal.         Judgment: Judgment normal.         This note was completed in part utilizing M-Modal Fluency Direct Software.  Grammatical errors, random word insertions, spelling mistakes, and incomplete sentences can be an occasional consequence of this system secondary to software limitations, ambient noise, and hardware issues.  If you  have any questions or concerns about the content, text, or information contained within the body of this dictation, please contact the provider for clarification.

## 2025-03-27 ENCOUNTER — RESULTS FOLLOW-UP (OUTPATIENT)
Dept: ENDOCRINOLOGY | Facility: CLINIC | Age: 65
End: 2025-03-27

## 2025-03-27 LAB
ALBUMIN UR ELPH-MCNC: 100 %
ALPHA1 GLOB MFR UR ELPH: 0 %
ALPHA2 GLOB MFR UR ELPH: 0 %
B-GLOBULIN MFR UR ELPH: 0 %
GAMMA GLOB MFR UR ELPH: 0 %
PROT PATTERN UR ELPH-IMP: NORMAL
PROT UR-MCNC: 14.9 MG/DL

## 2025-03-27 PROCEDURE — 84166 PROTEIN E-PHORESIS/URINE/CSF: CPT | Performed by: PATHOLOGY

## 2025-04-02 NOTE — QUICK NOTE
Investigation Report    Device investigated: Medtronic Stim and Pain pump  SCS Model:                    65134    8736-20   Leads info:                      [id number] x2                                                    N/A                Method investigated   imaging report  vendor contacted     [employer]   website used       Time spent investigating in minutes: 15+    Investigation findings: conditional    Risk vs Benefit performed.  If so, list physician and outcome: N/A

## 2025-04-04 ENCOUNTER — CLINICAL SUPPORT (OUTPATIENT)
Dept: BARIATRICS | Facility: CLINIC | Age: 65
End: 2025-04-04

## 2025-04-04 VITALS — WEIGHT: 280.6 LBS | BODY MASS INDEX: 38.06 KG/M2

## 2025-04-04 DIAGNOSIS — Z90.3 S/P GASTRIC SLEEVE PROCEDURE: ICD-10-CM

## 2025-04-04 DIAGNOSIS — Z98.84 BARIATRIC SURGERY STATUS: Primary | ICD-10-CM

## 2025-04-04 DIAGNOSIS — E11.65 TYPE 2 DIABETES MELLITUS WITH HYPERGLYCEMIA, WITHOUT LONG-TERM CURRENT USE OF INSULIN (HCC): ICD-10-CM

## 2025-04-04 PROCEDURE — RECHECK: Performed by: DIETITIAN, REGISTERED

## 2025-04-04 NOTE — PROGRESS NOTES
"Bariatric Nutrition Assessment Note    PLAN:  \"We discussed weight loss options now that his reflux has been addressed. He is on Mounjaro (started 2.5 months ago), prescribed by PCP. We discussed option for weight loss which included ANGEL and bypass. Patient is not interested in Bypass. He is interested in ANGEL. We discussed the procedure in detail and he would like to proceed with the level 2 revision process\"     Type of surgery  Vertical sleeve gastrectomy  Surgery Date: 11/19/2018  6 years 4 months post-op  Surgeon: Dr Jose Pritchard at [facility]    Surgery : robotic assisted paraesophageal hernia repair with magnetic sphincter augmentation device placement (LINX)  Surgery date 7/23/2024  8 months post-op  Surgeon: Dr. Mansi Mauro    Pre op - conversion to ANGEL  Level 2 Revision: 6 months required: 3 of 6 today  Expected date : July - August 2025  Surgeon Dr Brad Mauro     Pt ordered mounjaro by PCP to assist with BS control and weight loss   Takes two water pills in the afternoon    Nutrition Assessment   Aristeo Hall  64 y.o.  male  Wt 127 kg (280 lb 9.6 oz)   BMI 38.06 kg/m²   Wt Readings from Last 3 Encounters:   04/04/25 127 kg (280 lb 9.6 oz)   03/26/25 126 kg (278 lb)   03/25/25 126 kg (278 lb 6.4 oz)      Dmitriy- [place]. Gonzalez Equation:  2212  Estimated calories for weight maintenance:  2654  ( sedentary  )   Estimated calories for weight loss 7833-5903 ( 1-2# per wk wt loss - sedentary )  Estimated protein needs  grams per day (1.0-1.2gms/kg IBW )  Estimated fluid needs  oz per day (30-35 ml/kg IBW )      Per pt report:  Pt reports weighed 410lbs when started the surgical program at [facility]  364.2lbs on day of sleeve surgery  Lowest weight 219lbs at 1 year post-op  7596-2923 got up to 250lbs, regained back up to 320lbs  Weight in (lb) to have BMI = 25: 192.7  Pre-Op Excess Wt: 171.5lbs  Post-Op Wt Loss: 83.6#/ 48.7% EBWL/ 23% TBWL  in 6 year(s) 4 months  Pt reports his personal weight " maintenance goal is 245-250lbs    Review of History and Medications   Past Medical History:   Diagnosis Date    Acute on chronic diastolic congestive heart failure ([facility])     Altered gait     Alzheimer disease ([facility])     per patients,,early onset     Aneurysm ([facility])     Angina pectoris ([facility])     Anxiety     Arthritis [id number]    Brain aneurysm     coils placed    Bronchiectasis ([facility]) [id number]    Cardiac disease     Chest pain 01/13/2016    Chronic bronchitis ([facility]) [id number]    Chronic kidney disease 1016    Chronic pain     back/ right groin and rle- has morphine pump    Cluster headache     Constipation     COPD (chronic obstructive pulmonary disease) ([facility]) [id number]    Coronary artery disease 2003    Decubital ulcer     sacral decub-occured 5/2019-sees wound care/debide in OR today 6/6/2019    Dependent on walker for ambulation     w/c for long distance    Depression     Diabetes mellitus ([facility])     insulin dependent -- pt states no longer dependent on insulin    Difficulty walking     Dizziness     occ    Dysphagia     Enlarged prostate     Esophageal stricture In file    Esophageal varices ([facility])     Fall     Fall at home 05/03/2019    GERD (gastroesophageal reflux disease) [id number]    Headache, tension-type     Heart failure ([facility])     Hiatal hernia     Hx of gastric bypass 11/19/2018    states Nov 2019    Hypercholesterolemia     Hypertension 2003    Ingrown toenail     Memory loss     MI (myocardial infarction) ([facility])     2017- stents x2    Migraine     Morbid obesity ([facility])     gastric bypass sleeve 11/2018-wt loss 125 lb    Myocardial infarction ([facility]) 2003    Neuropathy     Obesity 2003    Opioid dependence, continuous ([facility]) 11/06/2017    Oxygen dependent     Q HS  2LPM with CPAP and prn during day 2-3 LPM  ( pt states no longer needs oxygen )    Peripheral neuropathy     Pressure injury of skin     Pressure injury of skin of sacral region 06/03/2019    Added automatically from request for surgery [id number]    Renal  disorder     Shortness of breath     Skin abnormality     sacral wound - covered with pad    Sleep apnea     couldnt tolerate CPAP    Sleep apnea, obstructive 2016    Stented coronary artery     Stroke ([facility]) 2003    vision loss b/l  2005, residual R leg weakness    Type 2 diabetes mellitus with diabetic neuropathy, with long-term current use of insulin ([facility]) 10/18/2019    Type 2 diabetes mellitus with renal complication ([facility])     insulin dependent    Type 2 diabetes mellitus, with long-term current use of insulin ([facility]) 10/11/2017    Transitioned From: Diabetes mellitus type 2, uncontrolled    Urinary frequency     Use of cane as ambulatory aid     Vision loss     Wears dentures     Wears glasses     Wears glasses     Wheelchair dependent     states uses walker & cane     Past Surgical History:   Procedure Laterality Date    BACK SURGERY      BRAIN SURGERY      CARDIAC CATHETERIZATION      with stents    CARDIAC CATHETERIZATION N/A 08/18/2023    Procedure: Cardiac Coronary Angiogram;  Surgeon: Horacio Weiner MD;  [facility];  Service: Cardiology    CEREBRAL ANEURYSM REPAIR      with coils    COLONOSCOPY      ESOPHAGOGASTRODUODENOSCOPY N/A 07/01/2016    Procedure: ESOPHAGOGASTRODUODENOSCOPY (EGD);  Surgeon: Reno Christiansen MD;  Location: [facility];  Service:     GASTRIC BYPASS  11/19/2018    HERNIA REPAIR  2015    HIATAL HERNIA REPAIR      INFUSION PUMP IMPLANTATION Left     morphine    INTRATHECAL PUMP IMPLANTATION Left 07/09/2020    Procedure: REVISION INTRATHECAL PAIN PUMP POCKET, LEFT ABDOMEN;  Surgeon: Homero Cho MD;  Location: [facility];  Service: Neurosurgery    KNEE ARTHROSCOPY Right     KNEE ARTHROSCOPY Right     PERONEAL NERVE DECOMPRESSION Right     HI DEBRIDEMENT OPEN WOUND FIRST 20 SQ CM/< N/A 06/06/2019    Procedure: EXCISIONAL DEBRIDEMENT OF SACRAL DECUBITUS ULCER;  Surgeon: Siddhartha Omer MD;  [facility];  Service: General    HI ESOPHAGOGASTRODUODENOSCOPY TRANSORAL  DIAGNOSTIC N/A 02/27/2017    Procedure: ESOPHAGOGASTRODUODENOSCOPY (EGD);  Surgeon: Reno Christiansen MD;  Location: [facility];  Service: Gastroenterology    DE ESOPHAGOGASTRODUODENOSCOPY TRANSORAL DIAGNOSTIC N/A 08/23/2018    Procedure: ESOPHAGOGASTRODUODENOSCOPY (EGD) with biopsy;  Surgeon: Reno Christiansen MD;  [facility];  Service: Gastroenterology    DE IMPLTJ/RPLCMT ITHCL/EDRL DRUG NFS PRGRBL PUMP Left 10/13/2020    Procedure: EXPLORATION OF INTRATHECAL PAIN PUMP SYSTEM INTEGRITY FOR POSSIBLE REPLACEMENT OF CATHETER AND PUMP.;  Surgeon: Homero Cho MD;  Location: [facility];  Service: Neurosurgery    DE IMPLTJ/RPLCMT ITHCL/EDRL DRUG NFS SUBQ RSVR N/A 01/19/2017    Procedure: REMOVAL / EXCHANGE INTRATHECAL PAIN PUMP;  Surgeon: Que Leonard MD;  [facility];  Service: Orthopedics    DE IMPLTJ/RPLCMT ITHCL/EDRL DRUG NFS SUBQ RSVR N/A 05/16/2016    Procedure: REPLACEMENT AND PROGRAM PUMP ;  Surgeon: Que Leonard MD;  [facility];  Service: Orthopedics    DE LAPS RPR PARAESPHGL HRNA INCL FUNDPLSTY W/MESH N/A 07/23/2024    Procedure: REPAIR HERNIA PARAESOPHAGEAL  W ROBOTICS & MAGNETIC SPINCTER AUGMENTATION DEVISE;  Surgeon: Mansi Mauro MD;  [facility];  Service: Bariatrics    DE PRQ IMPLTJ NSTIM ELECTRODE ARRAY EPIDURAL Right 03/17/2021    Procedure: INSERTION THORACIC DORSAL COLUMN SPINAL CORD STIMULATOR PERCUTANEOUS W IMPLANTABLE PULSE GENERATOR, RIGHT;  Surgeon: Homero Cho MD;  Location[facility];  Service: Neurosurgery     Social History     Socioeconomic History    Marital status: /Civil Union     Spouse name: Reva    Number of children: 5    Years of education: Not on file    Highest education level: GED or equivalent   Occupational History    Not on file   Tobacco Use    Smoking status: Never    Smokeless tobacco: Never   Vaping Use    Vaping status: Never Used   Substance and Sexual Activity    Alcohol use: Not Currently    Drug use: Not Currently     Types:  Marijuana, Morphine     Comment: Medical card is for CBD cream . states no THC use    Sexual activity: Not Currently     Partners: Female   Other Topics Concern    Not on file   Social History Narrative    Not on file     Social Drivers of Health     Financial Resource Strain: High Risk (9/19/2023)    Overall Financial Resource Strain (CARDIA)     Difficulty of Paying Living Expenses: Very hard   Food Insecurity: Food Insecurity Present (11/14/2024)    Hunger Vital Sign     Worried About Running Out of Food in the Last Year: Often true     Ran Out of Food in the Last Year: Often true   Transportation Needs: No Transportation Needs (11/14/2024)    PRAPARE - Transportation     Lack of Transportation (Medical): No     Lack of Transportation (Non-Medical): No   Physical Activity: Inactive (9/6/2019)    Exercise Vital Sign     Days of Exercise per Week: 0 days     Minutes of Exercise per Session: 0 min   Stress: Stress Concern Present (9/6/2019)    [place] [employer] - Occupational Stress Questionnaire     Feeling of Stress : Very much   Social Connections: Moderately Isolated (9/6/2019)    Social Connection and Isolation Panel [NHANES]     Frequency of Communication with Friends and Family: Never     Frequency of Social Gatherings with Friends and Family: Twice a week     Attends Ep[employer] Services: 1 to 4 times per year     Active Member of Clubs or Organizations: No     Attends Club or Organization Meetings: Never     Marital Status:    Intimate Partner Violence: Not At Risk (9/6/2019)    Humiliation, Afraid, Rape, and Kick questionnaire     Fear of Current or Ex-Partner: No     Emotionally Abused: No     Physically Abused: No     Sexually Abused: No   Housing Stability: Low Risk  (11/14/2024)    Housing Stability Vital Sign     Unable to Pay for Housing in the Last Year: No     Number of Times Moved in the Last Year: 1     Homeless in the Last Year: No       Current Outpatient  Medications:     albuterol (2.5 mg/3 mL) 0.083 % nebulizer solution, USE 1 VIAL IN NEBULIZER EVERY 6 HOURS AS NEEDED FOR WHEEZING OR SHORTNESS OF BREATH, Disp: 720 mL, Rfl: 2    albuterol (Ventolin HFA) 90 mcg/act inhaler, Inhale 2 puffs every 6 (six) hours as needed for wheezing, Disp: 18 g, Rfl: 3    ammonium lactate (LAC-HYDRIN) 12 % cream, APPLY  CREAM TOPICALLY TO AFFECTED AREA TWICE DAILY, Disp: 385 g, Rfl: 5    aspirin 81 mg chewable tablet, Chew 1 tablet (81 mg total) daily, Disp: 90 tablet, Rfl: 3    atorvastatin (LIPITOR) 80 mg tablet, Take 1 tablet (80 mg total) by mouth daily after dinner, Disp: 90 tablet, Rfl: 3    baclofen 10 mg tablet, Take 10 mg by mouth 3 (three) times a day, Disp: , Rfl:     bisacodyl (DULCOLAX) 5 mg EC tablet, Take 3 tablets (15 mg total) by mouth in the morning, Disp: 90 tablet, Rfl: 3    busPIRone (BUSPAR) 5 mg tablet, Take 1 tablet (5 mg total) by mouth 2 (two) times a day, Disp: 180 tablet, Rfl: 1    CALCIUM PO, Take 1 tablet by mouth daily, Disp: , Rfl:     carvedilol (COREG) 25 mg tablet, Take 1 tablet (25 mg total) by mouth 2 (two) times a day with meals, Disp: 180 tablet, Rfl: 3    clopidogrel (PLAVIX) 75 mg tablet, Take 1 tablet (75 mg total) by mouth daily, Disp: 90 tablet, Rfl: 3    Continuous Glucose Sensor (Dexcom G6 Sensor) MISC, 3 PACK SENSOR FOR CONTINUOUS GLUCOSE MONITORING, Disp: 9 each, Rfl: 1    Continuous Glucose Transmitter (Dexcom G6 Transmitter) MISC, 1 TRANSMITTED EVERY 3 MONTHS FOR CONTINUOUS GLUCOSE MONITORING, Disp: 1 each, Rfl: 0    docusate sodium (COLACE) 100 mg capsule, Take 1 capsule (100 mg total) by mouth 2 (two) times a day, Disp: 180 capsule, Rfl: 3    DULoxetine (Cymbalta) 30 mg delayed release capsule, Take 1 capsule (30 mg total) by mouth daily, Disp: 30 capsule, Rfl: 3    ergocalciferol (VITAMIN D2) 50,000 units, Take 1 capsule (50,000 Units total) by mouth 2 (two) times a week, Disp: 24 capsule, Rfl: 1    Fluticasone-Salmeterol (Advair)  500-50 mcg/dose inhaler, Inhale 1 puff daily Rinse mouth after use, Disp: 180 blister, Rfl: 1    fluticasone-umeclidinium-vilanterol (Trelegy Ellipta) 200-62.5-25 mcg/actuation AEPB inhaler, Inhale 1 puff daily Rinse mouth after use., Disp: 60 blister, Rfl: 3    folic acid (FOLVITE) 1 mg tablet, Take 1 tablet by mouth once daily, Disp: 90 tablet, Rfl: 1    furosemide (LASIX) 40 mg tablet, Take once in AM daily. Take once daily in PM PRN weight gain, edema., Disp: 180 tablet, Rfl: 3    gabapentin (NEURONTIN) 300 mg capsule, Take 1 capsule (300 mg total) by mouth daily as needed (headache), Disp: 30 capsule, Rfl: 5    gabapentin (NEURONTIN) 800 mg tablet, Take 1 tablet (800 mg total) by mouth 4 (four) times a day, Disp: 360 tablet, Rfl: 1    hydrocortisone 1 % lotion, Apply to rash twice/day as needed. Do not exceed use for longer than 2 weeks., Disp: 120 mL, Rfl: 1    HYDROmorphone HCl (Dilaudid) 0.2 MG/ML SOLN, Inject 0.2 mg under the skin, Disp: , Rfl:     lidocaine (LIDODERM) 5 %, Apply 1 patch topically daily back, Disp: , Rfl:     losartan (COZAAR) 50 mg tablet, Take 1 tablet (50 mg total) by mouth daily, Disp: 100 tablet, Rfl: 3    methocarbamol (ROBAXIN) 750 mg tablet, TAKE 1 TABLET (750 MG TOTAL) BY MOUTH EVERY 6 (SIX) HOURS AS NEEDED FOR MUSCLE SPASMS, Disp: 120 tablet, Rfl: 0    Naldemedine Tosylate 0.2 MG TABS, Take 0.2 mg by mouth in the morning, Disp: 30 tablet, Rfl: 2    nitroglycerin (NITROSTAT) 0.4 mg SL tablet, DISSOLVE ONE TABLET UNDER THE TONGUE EVERY 5 MINUTES AS NEEDED FOR CHEST PAIN.  DO NOT EXCEED A TOTAL OF 3 DOSES IN 15 MINUTES, Disp: 25 tablet, Rfl: 0    nystatin (MYCOSTATIN) cream, Apply 2 g (1 Application total) topically 2 (two) times a day, Disp: 15 g, Rfl: 0    Prucalopride Succinate 2 MG TABS, Take 2 mg by mouth in the morning, Disp: 30 tablet, Rfl: 5    ranolazine (RANEXA) 1000 MG SR tablet, Take 1 tablet (1,000 mg total) by mouth 2 (two) times a day, Disp: 180 tablet, Rfl: 3     "spironolactone (ALDACTONE) 25 mg tablet, Take 1 tablet (25 mg total) by mouth daily, Disp: 90 tablet, Rfl: 3    tamsulosin (FLOMAX) 0.4 mg, TAKE 1 CAPSULE BY MOUTH EVERY DAY WITH DINNER, Disp: 90 capsule, Rfl: 1    Tirzepatide (Mounjaro) 7.5 MG/0.5ML SOAJ, Once a week, Disp: 2 mL, Rfl: 6    topiramate (Topamax) 50 MG tablet, Take 1 tablet (50 mg total) by mouth every 12 (twelve) hours, Disp: 60 tablet, Rfl: 5    traZODone (DESYREL) 50 mg tablet, Take 1 tablet (50 mg total) by mouth 2 (two) times a day, Disp: 180 tablet, Rfl: 1    vitamin B-12 (VITAMIN B-12) 1,000 mcg tablet, Take 1 tablet (1,000 mcg total) by mouth daily, Disp: 90 tablet, Rfl: 3    Food Intake and Lifestyle Assessment   Food Intake Assessment completed via usual diet recall  Reports some days does not eat anything  Does not eat breakfast  Some days has a piece of toast and half bottle of vitamin water  \"I don't eat a lot, I pick\"  Wife cuts up chicken breast- eats 2-3 bites, goes back and takes another 2-3 bites, does this all day  Goes back for a few more bites every 2 hours per pt  Drank some soup broth yesterday  Gets hungry at night  Sometimes 2 eggs and 3 piece farah for dinner-eats half, comes back later and eats the other half  Sleep 1-2am until 7am  Beverages: Regular or zero sugar vitamin water, tea with sweet n low-home brewed iced tea; 2-3 vitamin murray per day  Diet texture/stage: regular  Protein supplement: Ensure 1-2 per week: not sure what formula it is  Estimated protein intake per day: hard to determine based on recall   Estimated fluid intake per day: 64 oz or more  Meals eaten away from home:  not assessed   Typical meal pattern:   Eating Behaviors: grazes throughout the day due to inability to tolerate any volume   Food allergies or intolerances: vomits whenever he drinks milk, denies lactose intolerance or dairy allergy  Has no teeth- wife cuts up his food for him  Reports LINX device has almost resolved his " "heartburn/reflux  Pt reports significant decrease in appetite since starting Mounjaro.  Cultural or Pentecostalism considerations: N/A    Vitamin and Mineral regimen: no vitamins or supplements- \"I can't afford them\" about 1 year with no vitamins per pt report.  Pt states he gave his son who works at [employer] the vitamins list from the bariatric book, who is going to buy them for him.    Physical Assessment  Nutrition Related Findings  Constipation  Pt reports constipation with chronic pain medications: Pt's GI Dr patel Motegrity 2 mg daily and Dulcolax 15 mg daily, plenty of fluid and fiber    Physical Activity  Types of exercise: limited   Current physical limitations: cerebral aneurysm, walks with walker assistance. Completed concussion PT, Neuropathic pain is bad , walks with walker    Psychosocial Assessment   Support systems: spouse, 5 kids  Socioeconomic factors: disabled    Nutrition Diagnosis  Diagnosis: Inadequate protein intake (NI-5.7.3)  Related to: Altered GI function  As Evidenced by: Patient report      Nutrition Prescription: Recommend the following diet  Small frequent meals -soft as tolerated  Include 2 protein shakes per day     Interventions and Teaching   Patient educated on post-op nutrition guidelines  Patient educated and handouts provided  .Surgical changes to stomach / GI  Capacity of post-surgery stomach  Diet progression  Adequate hydration  Sugar and fat restriction to decrease \"dumping syndrome\"  Weight loss plateaus/ possibility of weight regain  Nutrition considerations after surgery  Protein supplements  Possible problems with poor eating habits  Potential for food intolerance after surgery, and ways to deal with them including: lactose intolerance, nausea, reflux, vomiting, diarrhea, food intolerance, appetite changes, gas  Vitamin / Mineral supplementation of Multivitamin with minerals and Calcium  Reviewed importance of high potency vitamins s/p ANGEL.  Provided pt with ANGEL vitamin " "comparison chart and reviewed most affordable options.  Strongly encouraged pt to begin these NOW.  Reviewed post-op constipation and diarrhea tips in Ch 6 with pt.  Discussed two-week liquid protein liver shrinking diet and importance of protein drinks after surgery.  Provided Ensure Max Protein coupons.    Education provided to: patient  Barriers to learning: No barriers identified  Readiness to change: action  Comprehension: verbalizes understanding   Expected Compliance: fair    Recommendations  Pt is an appropriate candidate for surgery. Yes    Evaluation/Monitoring   Eating pattern as discussed Tolerance of nutrition prescription Body weight Lab values Physical activity Bowel pattern    Goals  Eliminate sugar sweetened beverages, Food journal, Exercise 30 minutes 5 times per week, Complete lession plans 1-6, Eat 3 meals per day, and Eliminate mindless snacking    Workflow: (Incomplete in Bold):  Psych and/or D+A Clearance: N/A  Blood Work:   11/14/24: Lipid panel done  2/18/25: CBC+CMP done  PCP letter: N/A  Surgeon Appt: done 1/2/25  EGD: done 1/23/24  UGI done 7/24/24   PH Manometry done 4/16/24  Cardiac Risk Assessment with ECG: F/U 2/4/25.  ECHO+stress test done 3/6/25. Cardiology F/U done 4/01/250-no clearance noted in visit notes.  Pt has next F/U 6/17/25  Sleep Studies:  Pulmonology F/U 2/10/25: \"Refuses CPAP despite education regarding importance of ARLEEN tx. He does not want to try at all. He insists on getting evaluation for Inspire\"  ENT consult 3/10/25 to discuss Inspire, who ordered a sleep study with [facility] sleep center  3/26/25 sleep study done  4/3/25 [facility] sleep center follow-up: \"Impression/Plan:  Snoring: The patient has evidence of minimal sleep apnea based on AASMA1 scoring criteria, snoring by AASM1B criteria. At this time, he does not qualify for PAP equipment or for Inspire. Counseled on health weight loss as treatment for snoring. \"  Nicotine test: nonsmoker  Pre-Operative Program: 6 months " required:  1/6 1/22/25 at RD+SW eval  2/6 2/20/25 with SW  3/6 4/04/25 with RD  4/6 5/6/2025 with SW  5/6 6/ 6/6 7/  NAFLD Score Calculated: N/A  Hepatology consult: N/A  Under Initial Office weight: 1/2/25 surgeon consult: 290lbs    Time Spent:   30 Minutes

## 2025-04-07 NOTE — MISCELLANEOUS
Message  GI Reminder Recall [address] [place] [address] [address]:   Date: 08/28/2017   Dear Megan LUTZ:     Review of our records shows you are due for the following: Follow Up Visit  Please call the following office to schedule your appointment:   [address], [address], [place], [place], [address]  [phone]  We look forward to hearing from you! Sincerely,     [facility]      Signatures   Electronically signed by :  Hakeem Mullen, ; Aug 28 2017  2:35PM EST                       (Author)
DISPLAY PLAN FREE TEXT

## 2025-04-09 ENCOUNTER — TELEPHONE (OUTPATIENT)
Dept: GASTROENTEROLOGY | Facility: CLINIC | Age: 65
End: 2025-04-09

## 2025-04-10 ENCOUNTER — TELEPHONE (OUTPATIENT)
Dept: GASTROENTEROLOGY | Facility: CLINIC | Age: 65
End: 2025-04-10

## 2025-04-10 NOTE — TELEPHONE ENCOUNTER
This patient is currently scheduled for a Colonoscopy on 4- at [facility] with Dr Montano.    Due to patient's medical history, we are requesting clearance from your office. To avoid delay in the patient's care, we request a provider's approval. Thank You!     Please address the following: concussion on 2/18/2025        Does this patient need to be seen by you for clearance?            Please provide any new instructions for us to give to the patient or if you prefer an alternate plan:        _____________________________________________________________________________________________________________________________.        Thank you,   [facility]

## 2025-04-10 NOTE — TELEPHONE ENCOUNTER
Our mutual patient is scheduled for procedure: Colonoscopy     On: __4__/_ 23   _/_25    _      With:  __Dusty at [facility]_______    He/She is taking the following blood thinner:  Plavix       Can this be stopped ___7___ days prior to the procedure?      ~Last dose taken to be 4-15-25    Physician Approving clearance: __  Dr Taylor_____________________      Thank you,  [facility]   [phone]

## 2025-04-11 ENCOUNTER — TELEPHONE (OUTPATIENT)
Dept: GASTROENTEROLOGY | Facility: CLINIC | Age: 65
End: 2025-04-11

## 2025-04-11 NOTE — TELEPHONE ENCOUNTER
Procedure confirmed  Colonoscopy     Via: Spoke with patient.      Instructions given: Given to Patient at Visit     Prep Given: Golytely    Call the office if there are any questions.       7 day Plavix and Mounjaro hold, Last dose 4-15-25  ~still awaiting Neurology clearance as of now

## 2025-04-14 NOTE — TELEPHONE ENCOUNTER
Hi,  There are no contraindications from a neurological perspective for colonoscopy with sedation.  I sent ANTHONY Zelaya, a note regarding the clearance.   Thanks,  RC

## 2025-04-15 ENCOUNTER — TELEPHONE (OUTPATIENT)
Dept: FAMILY MEDICINE CLINIC | Facility: HOSPITAL | Age: 65
End: 2025-04-15

## 2025-04-15 DIAGNOSIS — Z79.899 HIGH RISK MEDICATION USE: ICD-10-CM

## 2025-04-15 DIAGNOSIS — F40.240 CLAUSTROPHOBIA: Primary | ICD-10-CM

## 2025-04-15 RX ORDER — LORAZEPAM 1 MG/1
TABLET ORAL
Qty: 2 TABLET | Refills: 0 | Status: SHIPPED | OUTPATIENT
Start: 2025-04-15

## 2025-04-21 ENCOUNTER — HOSPITAL ENCOUNTER (OUTPATIENT)
Dept: RADIOLOGY | Facility: HOSPITAL | Age: 65
Discharge: HOME | End: 2025-04-21

## 2025-04-21 DIAGNOSIS — Z96.89 SPINAL CORD STIMULATOR STATUS: ICD-10-CM

## 2025-04-22 ENCOUNTER — HOSPITAL ENCOUNTER (OUTPATIENT)
Dept: MRI IMAGING | Facility: HOSPITAL | Age: 65
Discharge: HOME | End: 2025-04-22
Payer: COMMERCIAL

## 2025-04-22 DIAGNOSIS — I67.1 CEREBRAL ANEURYSM: ICD-10-CM

## 2025-04-22 DIAGNOSIS — I63.9 CEREBROVASCULAR ACCIDENT (CVA), UNSPECIFIED MECHANISM (HCC): ICD-10-CM

## 2025-04-22 PROCEDURE — 70546 MR ANGIOGRAPH HEAD W/O&W/DYE: CPT

## 2025-04-22 PROCEDURE — 76014 MR SFTY IMPLT&/FB ASMT STF 1: CPT

## 2025-04-22 PROCEDURE — A9585 GADOBUTROL INJECTION: HCPCS | Performed by: NEUROLOGICAL SURGERY

## 2025-04-22 RX ORDER — GADOBUTROL 604.72 MG/ML
12 INJECTION INTRAVENOUS
Status: COMPLETED | OUTPATIENT
Start: 2025-04-22 | End: 2025-04-22

## 2025-04-22 RX ADMIN — GADOBUTROL 12 ML: 604.72 INJECTION INTRAVENOUS at 09:14

## 2025-04-23 ENCOUNTER — HOSPITAL ENCOUNTER (OUTPATIENT)
Dept: GASTROENTEROLOGY | Facility: HOSPITAL | Age: 65
Setting detail: OUTPATIENT SURGERY
Discharge: HOME | End: 2025-04-23
Payer: COMMERCIAL

## 2025-04-23 ENCOUNTER — ANESTHESIA EVENT (OUTPATIENT)
Dept: GASTROENTEROLOGY | Facility: HOSPITAL | Age: 65
End: 2025-04-23
Payer: COMMERCIAL

## 2025-04-23 ENCOUNTER — ANESTHESIA (OUTPATIENT)
Dept: GASTROENTEROLOGY | Facility: HOSPITAL | Age: 65
End: 2025-04-23
Payer: COMMERCIAL

## 2025-04-23 VITALS
HEART RATE: 82 BPM | DIASTOLIC BLOOD PRESSURE: 72 MMHG | SYSTOLIC BLOOD PRESSURE: 148 MMHG | RESPIRATION RATE: 20 BRPM | TEMPERATURE: 97.5 F | OXYGEN SATURATION: 96 %

## 2025-04-23 DIAGNOSIS — R19.5 POSITIVE COLORECTAL CANCER SCREENING USING COLOGUARD TEST: ICD-10-CM

## 2025-04-23 DIAGNOSIS — Z12.11 COLON CANCER SCREENING: ICD-10-CM

## 2025-04-23 LAB — GLUCOSE SERPL-MCNC: 127 MG/DL (ref 65–140)

## 2025-04-23 PROCEDURE — 82948 REAGENT STRIP/BLOOD GLUCOSE: CPT

## 2025-04-23 PROCEDURE — G0121 COLON CA SCRN NOT HI RSK IND: HCPCS | Performed by: INTERNAL MEDICINE

## 2025-04-23 RX ORDER — PROPOFOL 10 MG/ML
INJECTION, EMULSION INTRAVENOUS AS NEEDED
Status: DISCONTINUED | OUTPATIENT
Start: 2025-04-23 | End: 2025-04-23

## 2025-04-23 RX ORDER — SODIUM CHLORIDE 9 MG/ML
INJECTION, SOLUTION INTRAVENOUS CONTINUOUS PRN
Status: DISCONTINUED | OUTPATIENT
Start: 2025-04-23 | End: 2025-04-23

## 2025-04-23 RX ORDER — SODIUM CHLORIDE, SODIUM LACTATE, POTASSIUM CHLORIDE, CALCIUM CHLORIDE 600; 310; 30; 20 MG/100ML; MG/100ML; MG/100ML; MG/100ML
INJECTION, SOLUTION INTRAVENOUS CONTINUOUS PRN
Status: DISCONTINUED | OUTPATIENT
Start: 2025-04-23 | End: 2025-04-23

## 2025-04-23 RX ADMIN — SODIUM CHLORIDE: 0.9 INJECTION, SOLUTION INTRAVENOUS at 07:39

## 2025-04-23 RX ADMIN — PROPOFOL 100 MG: 10 INJECTION, EMULSION INTRAVENOUS at 07:39

## 2025-04-23 NOTE — ANESTHESIA POSTPROCEDURE EVALUATION
Post-Op Assessment Note    CV Status:  Stable  Pain Score: 0    Pain management: adequate       Mental Status:  Alert and awake   Hydration Status:  Euvolemic   PONV Controlled:  Controlled   Airway Patency:  Patent     Post Op Vitals Reviewed: Yes    No anethesia notable event occurred.    Staff: CRNA           Last Filed PACU Vitals:  Vitals Value Taken Time   Temp     Pulse 73    /62    Resp 16    SpO2 94

## 2025-04-23 NOTE — ANESTHESIA PREPROCEDURE EVALUATION
Procedure:  COLONOSCOPY    Relevant Problems   CARDIO   (+) Arteriosclerosis of artery of extremity ([facility])   (+) Cerebral aneurysm   (+) Chest pain   (+) Chronic diastolic congestive heart failure (HCC)   (+) Chronic migraine without aura   (+) Chronic migraine without aura without status migrainosus, not intractable   (+) Coronary artery disease of native artery of native heart with stable angina pectoris ([facility])   (+) Hyperlipidemia   (+) Stented coronary artery      ENDO   (+) Hyperparathyroidism ([facility])   (+) Type 2 diabetes mellitus with both eyes affected by mild nonproliferative retinopathy without macular edema, without long-term current use of insulin ([facility])   (+) Type 2 diabetes mellitus with hyperglycemia, without long-term current use of insulin ([facility])      GI/HEPATIC   (+) Esophageal dysphagia   (+) GERD (gastroesophageal reflux disease)   (+) Hiatal hernia      /RENAL   (+) Stage 2 chronic kidney disease      MUSCULOSKELETAL   (+) Hiatal hernia   (+) Primary osteoarthritis of both knees      NEURO/PSYCH   (+) Anxiety and depression   (+) CRPS (complex regional pain syndrome), lower limb   (+) Chronic daily headache   (+) Chronic migraine without aura   (+) Chronic migraine without aura without status migrainosus, not intractable   (+) Chronic pain disorder   (+) Diabetic neuropathy ([facility])   (+) Major depressive disorder, recurrent, moderate ([facility])   (+) Opioid dependence, continuous ([facility])      PULMONARY   (+) COPD (chronic obstructive pulmonary disease) ([facility])   (+) ARLEEN (obstructive sleep apnea)   (+) Obstructive sleep apnea syndrome   (+) Severe persistent asthma without complication     ARLEEN no CPAP - per patient ARLEEN resolved  CAD s/p stents  Gastric bypass  Weekly GLP1 agonist - last dose 4/15  H/o cerebral aneurysm, coiled - neurology clearance for endoscopy noted  CVA, intrathecal morphine pump    Stress test March 2025:    A pharmacological stress test was performed using regadenoson. The patient  experienced no angina during the test. The patient reached the end of the protocol. The patient reported dyspnea and dizziness during the stress test. Symptoms ended during recovery.    Stress ECG: The stress ECG is negative for ischemia after pharmacologic stress.    Perfusion: There is a left ventricular perfusion defect that is medium in size with moderate reduction in uptake present in the mid to basal inferior location(s) that is fixed.  This defect improves with prone imaging and has normal wall motion. This appears to be probable artifact caused by diaphragmatic activity.  No ischemia visualized.    Stress Function: Left ventricular function post-stress is normal. Stress ejection fraction is 62%.    There is image artifact, without diagnostic evidence for perfusion abnormality.      TTE March 2025:    Left Ventricle: Left ventricular cavity size is normal. Wall thickness is mildly increased. There is mild concentric hypertrophy. The left ventricular ejection fraction is 60%. Systolic function is normal. Although no diagnostic regional wall motion abnormality was identified, this possibility cannot be completely excluded on the basis of this study. Diastolic function is normal for age.    Right Ventricle: Right ventricular cavity size is mildly dilated. Systolic function is normal.    Aortic Valve: The aortic valve is trileaflet. The leaflets are moderately thickened. The leaflets are moderately calcified. The leaflets exhibit normal mobility. There is aortic valve sclerosis.    Aorta: The aortic root is normal in size. The ascending aorta is mildly dilated.    Prior TTE study available for comparison. Prior study date: 3/20/2024. No significant changes noted compared to the prior study.      Physical Exam    Airway    Mallampati score: II  TM Distance: >3 FB  Neck ROM: full     Dental    lower dentures and upper dentures    Cardiovascular  Cardiovascular exam normal    Pulmonary  Pulmonary exam normal      Other Findings        Anesthesia Plan  ASA Score- 3     Anesthesia Type- IV sedation with anesthesia with ASA Monitors.         Additional Monitors:     Airway Plan:            Plan Factors-    Chart reviewed. EKG reviewed.  Existing labs reviewed. Patient summary reviewed.                  Induction- intravenous.    Postoperative Plan-         Informed Consent- Anesthetic plan and risks discussed with patient.  I personally reviewed this patient with the CRNA. Discussed and agreed on the Anesthesia Plan with the CRNA..      NPO Status:  No vitals data found for the desired time range.

## 2025-04-23 NOTE — H&P
History and Physical -  [facility]  Aristeo Hall 64 y.o. male MRN: [id number]    HPI: Aristeo Hall is a 64 y.o. male who presents for colonoscopy due to positive Cologuard    REVIEW OF SYSTEMS: Per the HPI, and otherwise unremarkable.    Historical Information   Past Medical History:   Diagnosis Date    Acute on chronic diastolic congestive heart failure (HCC)     Altered gait     Alzheimer disease ([facility])     per patients,,early onset     Aneurysm ([facility])     Angina pectoris ([facility])     Anxiety     Arthritis [id number]    Brain aneurysm     coils placed    Bronchiectasis ([facility]) [id number]    Cardiac disease     Chest pain 01/13/2016    Chronic bronchitis ([facility]) [id number]    Chronic kidney disease 1016    Chronic pain     back/ right groin and rle- has morphine pump    Cluster headache     Constipation     COPD (chronic obstructive pulmonary disease) ([facility]) [id number]    Coronary artery disease 2003    Decubital ulcer     sacral decub-occured 5/2019-sees wound care/debide in OR today 6/6/2019    Dependent on walker for ambulation     w/c for long distance    Depression     Diabetes mellitus ([facility])     insulin dependent -- pt states no longer dependent on insulin    Difficulty walking     Dizziness     occ    Dysphagia     Enlarged prostate     Esophageal stricture In file    Esophageal varices ([facility])     Fall     Fall at home 05/03/2019    GERD (gastroesophageal reflux disease) [id number]    Headache, tension-type     Heart failure ([facility])     Hiatal hernia     Hx of gastric bypass 11/19/2018    states Nov 2019    Hypercholesterolemia     Hypertension 2003    Ingrown toenail     Memory loss     MI (myocardial infarction) ([facility])     2017- stents x2    Migraine     Morbid obesity ([facility])     gastric bypass sleeve 11/2018-wt loss 125 lb    Myocardial infarction ([facility]) 2003    Neuropathy     Obesity 2003    Opioid dependence, continuous ([facility]) 11/06/2017    Oxygen dependent     Q HS  2LPM with CPAP and  prn during day 2-3 LPM  ( pt states no longer needs oxygen )    Peripheral neuropathy     Pressure injury of skin     Pressure injury of skin of sacral region 06/03/2019    Added automatically from request for surgery [id number]    Renal disorder     Shortness of breath     Skin abnormality     sacral wound - covered with pad    Sleep apnea     couldnt tolerate CPAP    Sleep apnea, obstructive 2016    Stented coronary artery     Stroke ([facility]) 2003    vision loss b/l  2005, residual R leg weakness    Type 2 diabetes mellitus with diabetic neuropathy, with long-term current use of insulin ([facility]) 10/18/2019    Type 2 diabetes mellitus with renal complication ([facility])     insulin dependent    Type 2 diabetes mellitus, with long-term current use of insulin ([facility]) 10/11/2017    Transitioned From: Diabetes mellitus type 2, uncontrolled    Urinary frequency     Use of cane as ambulatory aid     Vision loss     Wears dentures     Wears glasses     Wears glasses     Wheelchair dependent     states uses walker & cane     Past Surgical History:   Procedure Laterality Date    BACK SURGERY      BRAIN SURGERY      CARDIAC CATHETERIZATION      with stents    CARDIAC CATHETERIZATION N/A 08/18/2023    Procedure: Cardiac Coronary Angiogram;  Surgeon: Horacio Weiner MD;  Location: [facility];  Service: Cardiology    CEREBRAL ANEURYSM REPAIR      with coils    COLONOSCOPY      ESOPHAGOGASTRODUODENOSCOPY N/A 07/01/2016    Procedure: ESOPHAGOGASTRODUODENOSCOPY (EGD);  Surgeon: Reno Christiansen MD;  Location: [facility];  Service:     GASTRIC BYPASS  11/19/2018    HERNIA REPAIR  2015    HIATAL HERNIA REPAIR      INFUSION PUMP IMPLANTATION Left     morphine    INTRATHECAL PUMP IMPLANTATION Left 07/09/2020    Procedure: REVISION INTRATHECAL PAIN PUMP POCKET, LEFT ABDOMEN;  Surgeon: Homero Cho MD;  Location: [facility];  Service: Neurosurgery    KNEE ARTHROSCOPY Right     KNEE ARTHROSCOPY Right     PERONEAL NERVE DECOMPRESSION Right     MN  DEBRIDEMENT OPEN WOUND FIRST 20 SQ CM/< N/A 06/06/2019    Procedure: EXCISIONAL DEBRIDEMENT OF SACRAL DECUBITUS ULCER;  Surgeon: Siddhartha Omer MD;  Location: [facility];  Service: General    CA ESOPHAGOGASTRODUODENOSCOPY TRANSORAL DIAGNOSTIC N/A 02/27/2017    Procedure: ESOPHAGOGASTRODUODENOSCOPY (EGD);  Surgeon: Reno Christiansen MD;  Location: [facility];  Service: Gastroenterology    CA ESOPHAGOGASTRODUODENOSCOPY TRANSORAL DIAGNOSTIC N/A 08/23/2018    Procedure: ESOPHAGOGASTRODUODENOSCOPY (EGD) with biopsy;  Surgeon: Reno Christiansen MD;  [facility]: [facility];  Service: Gastroenterology    CA IMPLTJ/RPLCMT ITHCL/EDRL DRUG NFS PRGRBL PUMP Left 10/13/2020    Procedure: EXPLORATION OF INTRATHECAL PAIN PUMP SYSTEM INTEGRITY FOR POSSIBLE REPLACEMENT OF CATHETER AND PUMP.;  Surgeon: Homero Cho MD;  Location: [facility];  Service: Neurosurgery    CA IMPLTJ/RPLCMT ITHCL/EDRL DRUG NFS SUBQ RSVR N/A 01/19/2017    Procedure: REMOVAL / EXCHANGE INTRATHECAL PAIN PUMP;  Surgeon: Que Leonard MD;  [facility];  Service: Orthopedics    CA IMPLTJ/RPLCMT ITHCL/EDRL DRUG NFS SUBQ RSVR N/A 05/16/2016    Procedure: REPLACEMENT AND PROGRAM PUMP ;  Surgeon: Que Leonard MD;  [facility];  Service: Orthopedics    CA LAPS RPR PARAESPHGL HRNA INCL FUNDPLSTY W/MESH N/A 07/23/2024    Procedure: REPAIR HERNIA PARAESOPHAGEAL  W ROBOTICS & MAGNETIC SPINCTER AUGMENTATION DEVISE;  Surgeon: Mansi Mauro MD;  [facility];  Service: Bariatrics    CA PRQ IMPLTJ NSTIM ELECTRODE ARRAY EPIDURAL Right 03/17/2021    Procedure: INSERTION THORACIC DORSAL COLUMN SPINAL CORD STIMULATOR PERCUTANEOUS W IMPLANTABLE PULSE GENERATOR, RIGHT;  Surgeon: Homero Cho MD;  Location: [facility];  Service: Neurosurgery     Social History   Social History     Substance and Sexual Activity   Alcohol Use Not Currently     Social History     Substance and Sexual Activity   Drug Use Not Currently    Types: Marijuana, Morphine    Comment: Medical  card is for CBD cream . states no THC use     Social History     Tobacco Use   Smoking Status Never   Smokeless Tobacco Never     Family History   Problem Relation Age of Onset    Diabetes unspecified Mother     Diabetes Mother     Migraines Mother     Heart attack Father     Heart disease Father     Hypertension Father     Stroke Father     Neuropathy Father     Diabetes unspecified Brother     Depression Brother     Mental illness Brother     COPD Brother     Drug abuse Brother     Bipolar disorder Brother     Diabetes unspecified Brother     Diabetes unspecified Maternal Grandmother     Diabetes unspecified Paternal Grandmother     Diabetes unspecified Paternal Uncle     ADD / ADHD Cousin     Colon cancer Neg Hx     Colon polyps Neg Hx        Meds/Allergies       Current Outpatient Medications:     aspirin 81 mg chewable tablet    atorvastatin (LIPITOR) 80 mg tablet    baclofen 10 mg tablet    busPIRone (BUSPAR) 5 mg tablet    carvedilol (COREG) 25 mg tablet    DULoxetine (Cymbalta) 30 mg delayed release capsule    ergocalciferol (VITAMIN D2) 50,000 units    Fluticasone-Salmeterol (Advair) 500-50 mcg/dose inhaler    fluticasone-umeclidinium-vilanterol (Trelegy Ellipta) 200-62.5-25 mcg/actuation AEPB inhaler    folic acid (FOLVITE) 1 mg tablet    furosemide (LASIX) 40 mg tablet    gabapentin (NEURONTIN) 300 mg capsule    gabapentin (NEURONTIN) 800 mg tablet    losartan (COZAAR) 50 mg tablet    ranolazine (RANEXA) 1000 MG SR tablet    spironolactone (ALDACTONE) 25 mg tablet    tamsulosin (FLOMAX) 0.4 mg    topiramate (Topamax) 50 MG tablet    traZODone (DESYREL) 50 mg tablet    vitamin B-12 (VITAMIN B-12) 1,000 mcg tablet    albuterol (2.5 mg/3 mL) 0.083 % nebulizer solution    albuterol (Ventolin HFA) 90 mcg/act inhaler    ammonium lactate (LAC-HYDRIN) 12 % cream    bisacodyl (DULCOLAX) 5 mg EC tablet    CALCIUM PO    clopidogrel (PLAVIX) 75 mg tablet    Continuous Glucose Sensor (Dexcom G6 Sensor) MISC     Continuous Glucose Transmitter (Dexcom G6 Transmitter) MISC    docusate sodium (COLACE) 100 mg capsule    hydrocortisone 1 % lotion    HYDROmorphone HCl (Dilaudid) 0.2 MG/ML SOLN    lidocaine (LIDODERM) 5 %    LORazepam (ATIVAN) 1 mg tablet    methocarbamol (ROBAXIN) 750 mg tablet    Naldemedine Tosylate 0.2 MG TABS    naloxone (NARCAN) 4 mg/0.1 mL nasal spray    nitroglycerin (NITROSTAT) 0.4 mg SL tablet    nystatin (MYCOSTATIN) cream    Prucalopride Succinate 2 MG TABS    Tirzepatide (Mounjaro) 7.5 MG/0.5ML SOAJ  No current facility-administered medications for this encounter.    No Known Allergies    Objective     /89   Pulse 88   Temp 97.5 °F (36.4 °C) (Temporal)   Resp 18   SpO2 96%     PHYSICAL EXAM    General Appearance: NAD, cooperative, alert  Eyes: Anicteric  GI:  Soft, non-tender, non-distended; normal bowel sounds; no masses, no organomegaly   Rectal: Deferred until procedure  Musculoskeletal: No edema.  Skin:  No jaundice    ASSESSMENT/PLAN:  This is a 64 y.o. male here for colonoscopy, and he is stable and optimized for his procedure.

## 2025-04-27 DIAGNOSIS — I50.32 CHRONIC DIASTOLIC CONGESTIVE HEART FAILURE (HCC): ICD-10-CM

## 2025-04-29 ENCOUNTER — OFFICE VISIT (OUTPATIENT)
Dept: ENDOCRINOLOGY | Facility: CLINIC | Age: 65
End: 2025-04-29
Payer: COMMERCIAL

## 2025-04-29 VITALS
SYSTOLIC BLOOD PRESSURE: 120 MMHG | BODY MASS INDEX: 36.41 KG/M2 | HEIGHT: 72 IN | WEIGHT: 268.8 LBS | DIASTOLIC BLOOD PRESSURE: 78 MMHG | HEART RATE: 99 BPM | OXYGEN SATURATION: 96 %

## 2025-04-29 DIAGNOSIS — I25.118 CORONARY ARTERY DISEASE OF NATIVE ARTERY OF NATIVE HEART WITH STABLE ANGINA PECTORIS (HCC): ICD-10-CM

## 2025-04-29 DIAGNOSIS — E55.9 VITAMIN D DEFICIENCY: ICD-10-CM

## 2025-04-29 DIAGNOSIS — E11.65 TYPE 2 DIABETES MELLITUS WITH HYPERGLYCEMIA, WITHOUT LONG-TERM CURRENT USE OF INSULIN (HCC): Primary | ICD-10-CM

## 2025-04-29 DIAGNOSIS — Z98.84 BARIATRIC SURGERY STATUS: ICD-10-CM

## 2025-04-29 DIAGNOSIS — E11.3293 TYPE 2 DIABETES MELLITUS WITH BOTH EYES AFFECTED BY MILD NONPROLIFERATIVE RETINOPATHY WITHOUT MACULAR EDEMA, WITHOUT LONG-TERM CURRENT USE OF INSULIN (HCC): ICD-10-CM

## 2025-04-29 PROCEDURE — 99214 OFFICE O/P EST MOD 30 MIN: CPT

## 2025-04-29 PROCEDURE — 95251 CONT GLUC MNTR ANALYSIS I&R: CPT

## 2025-04-29 RX ORDER — ERGOCALCIFEROL 1.25 MG/1
50000 CAPSULE, LIQUID FILLED ORAL 2 TIMES WEEKLY
Qty: 24 CAPSULE | Refills: 1 | Status: SHIPPED | OUTPATIENT
Start: 2025-05-01

## 2025-04-29 RX ORDER — FUROSEMIDE 40 MG/1
TABLET ORAL
Qty: 180 TABLET | Refills: 0 | OUTPATIENT
Start: 2025-04-29

## 2025-04-29 RX ORDER — TIRZEPATIDE 10 MG/.5ML
INJECTION, SOLUTION SUBCUTANEOUS
Qty: 2 ML | Refills: 2 | Status: SHIPPED | OUTPATIENT
Start: 2025-04-29

## 2025-04-29 RX ORDER — ATORVASTATIN CALCIUM 80 MG/1
80 TABLET, FILM COATED ORAL
Qty: 90 TABLET | Refills: 3 | Status: SHIPPED | OUTPATIENT
Start: 2025-04-29

## 2025-04-29 NOTE — PROGRESS NOTES
Name: Aristeo Hall      : 1960      MRN: [id number]  Encounter Provider: JACLYN Reyes  Encounter Date: 2025   Encounter department: [facility] [place]    Chief Complaint   Patient presents with    Diabetes Type 2     Assessment & Plan  Type 2 Diabetes Mellitus.  His diabetes is well-managed, with occasional spikes in blood sugar levels due to dietary indiscretions. He uses a Dexcom device to monitor his glucose levels. He is advised to continue monitoring his diet and avoid foods high in sugar. The dosage of Mounjaro will be increased to 10 mg to aid in weight loss. He is encouraged to maintain regular exercise and ensure adequate protein intake.    2. Weight management.  He has experienced weight loss with the use of Mounjaro. He is scheduled for a ANGEL procedure in October but may cancel it if he continues to lose weight. He is advised to continue his current regimen and monitor his progress. If he loses more weight by the next visit, the Mounjaro dosage may be adjusted further.    3. Vitamin D deficiency.  His vitamin D levels are significantly low, which could be contributing to his fatigue. A prescription for vitamin D has been reordered. He is instructed to take it twice a week as previously prescribed.    4. Hypercholesterolemia.  His cholesterol levels are elevated. A prescription for atorvastatin has been reordered. He is advised to restart taking the medication as previously prescribed.    Follow-up  The patient will follow up in 6 months.    PROCEDURE  The patient has undergone a colonoscopy with cardiac clearance recently.    History of Present Illness  Aristeo Hall is a 63 y.o. male with a history of type 2 diabetes, CHF, CAD, ARLEEN, gastric sleeve (2019), CKD, hyperlipidemia, obesity. Complications: CVA, neuropathy. Last A1C was 5.9. Home glucose monitoring: CGM.     No longer taking Jardiance  Has not been taking  vitamin D supplement or statin medication      Current regimen:   Mounjaro 7.5 mg/week      He has been experiencing a general sense of weakness, which he attributes to overexertion earlier in the day. He has been under medical supervision for this issue and has undergone a colonoscopy with cardiac clearance. Additionally, a CT scan of his brain was performed following a fall.    He has been monitoring his dietary intake using Dexcom, which has helped him identify foods that cause his blood sugar levels to spike. He recalls an incident where his blood sugar level reached 270 after consuming candy. He is currently on Mounjaro and has requested an increase in dosage to facilitate further weight loss. He has lost weight since starting Mounjaro and is considering canceling a scheduled ANGEL procedure in October if his current weight loss trend continues. He had previously undergone a sleeve procedure but regained the weight due to emotional stress, which also led to the recurrence of his diabetes. He is making efforts to maintain a healthy diet and exercise regularly.    He has not been adhering to his prescribed regimen of vitamin D twice weekly due to misplacement of the medication. He is currently on Plavix.    MEDICATIONS  Current: Plavix, Mounjaro  Discontinued: Atorvastatin      CGM Interpretation:  Date Range: April 16 to April 29, 2025  Device used: Dexcom  Option - Home use   Option - Indication: Type 2 Diabetes  Analysis of data:   Average Glucose: 145  GMI: 6.8%  Coefficient of Variation: 19.5%  SD: 28 mg/dL  Time in Target Range: 89%   Time Above Range: 11% high, 0% very high  Time Below Range: 0% low, <1% very low  Interpretation of data: mild hyperglycemia around meals  More than 72 hours of data was reviewed. Report to be scanned to chart.       Last Eye Exam: 11/19/2024  Last Foot Exam: 09/19/2024    Health Maintenance   Topic Date Due    Diabetic Foot Exam  09/19/2025    Diabetic Eye Exam  11/19/2025          Review of Systems   Constitutional:  Negative for chills and fever.   HENT:  Negative for ear pain and sore throat.    Eyes:  Negative for pain and visual disturbance.   Respiratory:  Negative for cough and shortness of breath.    Cardiovascular:  Negative for chest pain and palpitations.   Gastrointestinal:  Negative for abdominal pain and vomiting.   Genitourinary:  Negative for dysuria and hematuria.   Musculoskeletal:  Negative for arthralgias and back pain.   Skin:  Negative for color change and rash.   Neurological:  Negative for seizures and syncope.   All other systems reviewed and are negative.   as per HPI       Medical History Reviewed by provider this encounter:     .    Objective   /78   Pulse 99   Ht 6' (1.829 m)   Wt 122 kg (268 lb 12.8 oz)   SpO2 96%   BMI 36.46 kg/m²      Body mass index is 36.46 kg/m².  Wt Readings from Last 3 Encounters:   04/29/25 122 kg (268 lb 12.8 oz)   04/04/25 127 kg (280 lb 9.6 oz)   03/26/25 126 kg (278 lb)     Physical Exam    Physical Exam  Vitals reviewed.   HENT:      Head: Normocephalic and atraumatic.   Cardiovascular:      Rate and Rhythm: Normal rate.   Pulmonary:      Effort: Pulmonary effort is normal.   Neurological:      Mental Status: He is alert.         Results  Laboratory Studies  Vitamin D is very low. Cholesterol is high.  Labs:   Lab Results   Component Value Date    HGBA1C 5.9 (H) 03/26/2025    HGBA1C 6.3 (H) 11/14/2024    HGBA1C 6.3 10/01/2024     Lab Results   Component Value Date    CREATININE 1.12 03/26/2025    CREATININE 0.97 03/04/2025    CREATININE 1.24 02/18/2025    BUN 12 03/26/2025     (L) 10/21/2015    K 3.8 03/26/2025     03/26/2025    CO2 33 (H) 03/26/2025     GFR, Calculated   Date Value Ref Range Status   01/08/2020 78 >60 mL/min/1.73m2 Final     Comment:     mL/min per 1.73 square meters                                            Normal Function or Mild Renal    Disease (if clinically at risk):   >or=60  Moderately Decreased:                30-59  Severely Decreased:                  15-29  Renal Failure:                         <15                                            -American GFR: multiply reported GFR by 1.16    Please note that the eGFR is based on the CKD-EPI calculation, and is not intended to be used for drug dosing.                                            Note: Calculated GFR may not be an accurate indicator of renal function if the patient's renal function is not in a steady state.     eGFRcr   Date Value Ref Range Status   07/14/2022 100 >59 Final     eGFR   Date Value Ref Range Status   03/26/2025 70 ml/min/1.73sq m Final     Lab Results   Component Value Date    CHOL 119 01/06/2015    HDL 33 (L) 03/26/2025    TRIG 126 03/26/2025     Lab Results   Component Value Date    ALT 21 03/26/2025    AST 22 03/26/2025    ALKPHOS 95 03/26/2025    BILITOT 0.81 10/19/2015     Lab Results   Component Value Date    CDA8NOCPPLFZ 1.873 03/26/2025    TVX0VFMYQKAI 2.755 11/14/2024    JVR8YQGOQJKL 3.127 03/28/2024       There are no Patient Instructions on file for this visit.    Discussed with the patient and all questioned fully answered. He will call me if any problems arise.

## 2025-05-05 ENCOUNTER — TELEPHONE (OUTPATIENT)
Age: 65
End: 2025-05-05

## 2025-05-05 DIAGNOSIS — F33.1 MAJOR DEPRESSIVE DISORDER, RECURRENT, MODERATE (HCC): Chronic | ICD-10-CM

## 2025-05-05 NOTE — TELEPHONE ENCOUNTER
Patient called to reschedule appointment 05/06/2025 at 9 am pre op level revision with Vidhya due to patient spouse has appointment same time. Please call patient at [phone]

## 2025-05-06 RX ORDER — BUSPIRONE HYDROCHLORIDE 5 MG/1
5 TABLET ORAL 2 TIMES DAILY
Qty: 180 TABLET | Refills: 1 | Status: SHIPPED | OUTPATIENT
Start: 2025-05-06

## 2025-05-07 NOTE — PROGRESS NOTES
"Behavioral Health Follow Up Note:      4 / 6  Weight Check:  Surgeon: Dr. Mansi Mauro  Level 2 Revision Pathway- for conversion sleeve to ANGEL    -status post PEH repair with LINX performed by Dr. Brad Mauro on 7/23/24   -Hx sleeve 2018      Starting weight 290.0   # (discussed need to be (at minimum) at or below starting weight at time case is submitted to insurance)  Today's weight 273.2   #    Surgery month:   JULY-AUG     Mental health and wellness - Patient is appropriately making changes based off the information contained in the bariatric manual.  Patient is modifying behaviors and demonstrating adapting to change.   On Mounjaro which is helping with weight loss, diabetes and appetite. Denies GERD, reflux with LINX.     HALT: Aristeo feels he is successful with Mounjaro and habit changes and no longer is interested in revision program at this time. Understands he can return to surgical program in the future if needed, would restart with surgeon consult and may need to redo clearances. Will schedule yearly follow up with surgeon.     Eating behaviors - SF vitamin water for hydration, small meals, protein focused, cut out sweets.     Activity -  active with yard work    Progress toward program requirements    Workflow:  Psych and/or D+A additional documentation: n/a  PCP Letter: n/a  Support Group: RECOMMENDED  Surgeon Appt.: 1/2/2025  EGD : [facility] 7/24/24 PH Manometry 4/16/24, EGD 1/23/24   Cardiac Risk Assessment:  Cardiology F/U done 4/01/250-no clearance noted in visit notes.  Pt has next F/U 6/17/25   Sleep Studies:  Pulmonology F/U 2/10/25: \"Refuses CPAP despite education regarding importance of ARLEEN tx. He does not want to try at all. He insists on getting evaluation for Inspire\"  ENT consult 3/10/25 to discuss Inspire, who ordered a sleep study with [facility] sleep center  3/26/25 sleep study done  4/3/25 [facility] sleep center follow-up: \"Impression/Plan:  Snoring: The patient has evidence of minimal sleep apnea " "based on AASMA1 scoring criteria, snoring by AASM1B criteria. At this time, he does not qualify for PAP equipment or for Inspire. Counseled on health weight loss as treatment for snoring. \"  Bloodwork: completed       "

## 2025-05-08 ENCOUNTER — TELEPHONE (OUTPATIENT)
Dept: BARIATRICS | Facility: CLINIC | Age: 65
End: 2025-05-08

## 2025-05-08 ENCOUNTER — CLINICAL SUPPORT (OUTPATIENT)
Dept: BARIATRICS | Facility: CLINIC | Age: 65
End: 2025-05-08

## 2025-05-08 VITALS — WEIGHT: 273.2 LBS | BODY MASS INDEX: 37.05 KG/M2

## 2025-05-08 DIAGNOSIS — Z98.84 BARIATRIC SURGERY STATUS: Primary | ICD-10-CM

## 2025-05-08 PROCEDURE — RECHECK

## 2025-05-08 NOTE — TELEPHONE ENCOUNTER
PA: Aristeo feels he is successful with Mounjaro and habit changes and no longer is interested in revision program at this time. Understands he can return to surgical program in the future if needed, would restart with surgeon consult and may need to redo clearances.

## 2025-05-11 ENCOUNTER — OFFICE VISIT (OUTPATIENT)
Dept: URGENT CARE | Facility: CLINIC | Age: 65
End: 2025-05-11
Payer: COMMERCIAL

## 2025-05-11 VITALS
TEMPERATURE: 99.2 F | HEART RATE: 88 BPM | OXYGEN SATURATION: 95 % | WEIGHT: 273 LBS | DIASTOLIC BLOOD PRESSURE: 86 MMHG | SYSTOLIC BLOOD PRESSURE: 124 MMHG | BODY MASS INDEX: 36.98 KG/M2 | HEIGHT: 72 IN | RESPIRATION RATE: 18 BRPM

## 2025-05-11 DIAGNOSIS — J44.1 COPD WITH ACUTE EXACERBATION (HCC): Primary | ICD-10-CM

## 2025-05-11 DIAGNOSIS — R05.1 ACUTE COUGH: ICD-10-CM

## 2025-05-11 LAB
SARS-COV-2 AG UPPER RESP QL IA: NEGATIVE
VALID CONTROL: NORMAL

## 2025-05-11 PROCEDURE — 99213 OFFICE O/P EST LOW 20 MIN: CPT

## 2025-05-11 PROCEDURE — S9083 URGENT CARE CENTER GLOBAL: HCPCS

## 2025-05-11 PROCEDURE — 87811 SARS-COV-2 COVID19 W/OPTIC: CPT

## 2025-05-11 PROCEDURE — G0463 HOSPITAL OUTPT CLINIC VISIT: HCPCS

## 2025-05-11 RX ORDER — AZITHROMYCIN 250 MG/1
TABLET, FILM COATED ORAL
Qty: 6 TABLET | Refills: 0 | Status: SHIPPED | OUTPATIENT
Start: 2025-05-11 | End: 2025-05-15

## 2025-05-11 RX ORDER — PREDNISONE 10 MG/1
TABLET ORAL
Qty: 15 TABLET | Refills: 0 | Status: SHIPPED | OUTPATIENT
Start: 2025-05-11 | End: 2025-05-16

## 2025-05-11 NOTE — PROGRESS NOTES
"  [facility]        NAME: Aristeo Hall is a 64 y.o. male  : 1960    MRN: [id number]  DATE: May 11, 2025  TIME: 12:01 PM    Assessment and Plan   COPD with acute exacerbation (HCC) [J44.1]  1. COPD with acute exacerbation (HCC)  azithromycin (ZITHROMAX) 250 mg tablet    predniSONE 10 mg tablet      2. Acute cough  Poct Covid 19 Rapid Antigen Test            Patient Instructions       Follow up with PCP in 3-5 days.  Proceed to  ER if symptoms worsen.    If tests have been performed at [facility], our office will contact you with results if changes need to be made to the care plan discussed with you at the visit.  [facility]    Chief Complaint     Chief Complaint   Patient presents with    Cold Like Symptoms     Pt reports cold like symptoms with productive cough green sputum, chest congestion, wheezing, fever, and h/a. States onset of symptoms four days ago. Managing symptoms with Aleve last dose yesterday.          History of Present Illness       64-year-old male with past medical history of COPD, CHF, DM2 presents for \"green phlegm, wheezing, green chunks, fevers and chills.  \"Symptom started approximately 4 days ago.  Using Aleve without relief.  Subjective fevers at home.  Denies known sick contacts.        Review of Systems   Review of Systems   Constitutional:  Positive for chills, fatigue and fever.   HENT:  Negative for congestion, ear pain, postnasal drip, rhinorrhea and sore throat.    Respiratory:  Positive for cough, chest tightness and wheezing.          Current Medications       Current Outpatient Medications:     albuterol (2.5 mg/3 mL) 0.083 % nebulizer solution, USE 1 VIAL IN NEBULIZER EVERY 6 HOURS AS NEEDED FOR WHEEZING OR SHORTNESS OF BREATH, Disp: 720 mL, Rfl: 2    albuterol (Ventolin HFA) 90 mcg/act inhaler, Inhale 2 puffs every 6 (six) hours as needed for wheezing, Disp: 18 g, Rfl: 3    ammonium lactate (LAC-HYDRIN) 12 % " cream, APPLY  CREAM TOPICALLY TO AFFECTED AREA TWICE DAILY, Disp: 385 g, Rfl: 5    aspirin 81 mg chewable tablet, Chew 1 tablet (81 mg total) daily, Disp: 90 tablet, Rfl: 3    atorvastatin (LIPITOR) 80 mg tablet, Take 1 tablet (80 mg total) by mouth daily after dinner, Disp: 90 tablet, Rfl: 3    azithromycin (ZITHROMAX) 250 mg tablet, Take 2 tablets today then 1 tablet daily x 4 days, Disp: 6 tablet, Rfl: 0    baclofen 10 mg tablet, Take 10 mg by mouth 3 (three) times a day, Disp: , Rfl:     bisacodyl (DULCOLAX) 5 mg EC tablet, Take 3 tablets (15 mg total) by mouth in the morning, Disp: 90 tablet, Rfl: 3    busPIRone (BUSPAR) 5 mg tablet, Take 1 tablet by mouth twice daily, Disp: 180 tablet, Rfl: 1    CALCIUM PO, Take 1 tablet by mouth daily, Disp: , Rfl:     carvedilol (COREG) 25 mg tablet, Take 1 tablet (25 mg total) by mouth 2 (two) times a day with meals, Disp: 180 tablet, Rfl: 3    clopidogrel (PLAVIX) 75 mg tablet, Take 1 tablet (75 mg total) by mouth daily, Disp: 90 tablet, Rfl: 3    Continuous Glucose Sensor (Dexcom G6 Sensor) MISC, 3 PACK SENSOR FOR CONTINUOUS GLUCOSE MONITORING, Disp: 9 each, Rfl: 1    Continuous Glucose Transmitter (Dexcom G6 Transmitter) MISC, 1 TRANSMITTED EVERY 3 MONTHS FOR CONTINUOUS GLUCOSE MONITORING, Disp: 1 each, Rfl: 0    docusate sodium (COLACE) 100 mg capsule, Take 1 capsule (100 mg total) by mouth 2 (two) times a day, Disp: 180 capsule, Rfl: 3    DULoxetine (Cymbalta) 30 mg delayed release capsule, Take 1 capsule (30 mg total) by mouth daily, Disp: 30 capsule, Rfl: 3    ergocalciferol (VITAMIN D2) 50,000 units, Take 1 capsule (50,000 Units total) by mouth 2 (two) times a week, Disp: 24 capsule, Rfl: 1    Fluticasone-Salmeterol (Advair) 500-50 mcg/dose inhaler, Inhale 1 puff daily Rinse mouth after use, Disp: 180 blister, Rfl: 1    fluticasone-umeclidinium-vilanterol (Trelegy Ellipta) 200-62.5-25 mcg/actuation AEPB inhaler, Inhale 1 puff daily Rinse mouth after use., Disp: 60  blister, Rfl: 3    folic acid (FOLVITE) 1 mg tablet, Take 1 tablet by mouth once daily, Disp: 90 tablet, Rfl: 1    furosemide (LASIX) 40 mg tablet, Take once in AM daily. Take once daily in PM PRN weight gain, edema., Disp: 180 tablet, Rfl: 3    gabapentin (NEURONTIN) 300 mg capsule, Take 1 capsule (300 mg total) by mouth daily as needed (headache), Disp: 30 capsule, Rfl: 5    gabapentin (NEURONTIN) 800 mg tablet, Take 1 tablet (800 mg total) by mouth 4 (four) times a day, Disp: 360 tablet, Rfl: 1    hydrocortisone 1 % lotion, Apply to rash twice/day as needed. Do not exceed use for longer than 2 weeks., Disp: 120 mL, Rfl: 1    HYDROmorphone HCl (Dilaudid) 0.2 MG/ML SOLN, Inject 0.2 mg under the skin, Disp: , Rfl:     lidocaine (LIDODERM) 5 %, Apply 1 patch topically daily back, Disp: , Rfl:     LORazepam (ATIVAN) 1 mg tablet, Take 1 tablet 30 mins before MRI, can repeat 2nd tablet at time of MRI if needed, Disp: 2 tablet, Rfl: 0    losartan (COZAAR) 50 mg tablet, Take 1 tablet (50 mg total) by mouth daily, Disp: 100 tablet, Rfl: 3    methocarbamol (ROBAXIN) 750 mg tablet, TAKE 1 TABLET (750 MG TOTAL) BY MOUTH EVERY 6 (SIX) HOURS AS NEEDED FOR MUSCLE SPASMS, Disp: 120 tablet, Rfl: 0    Naldemedine Tosylate 0.2 MG TABS, Take 0.2 mg by mouth in the morning, Disp: 30 tablet, Rfl: 2    naloxone (NARCAN) 4 mg/0.1 mL nasal spray, Administer 1 spray into a nostril. If no response after 2-3 minutes, give another dose in the other nostril using a new spray., Disp: 1 each, Rfl: 1    nitroglycerin (NITROSTAT) 0.4 mg SL tablet, DISSOLVE ONE TABLET UNDER THE TONGUE EVERY 5 MINUTES AS NEEDED FOR CHEST PAIN.  DO NOT EXCEED A TOTAL OF 3 DOSES IN 15 MINUTES, Disp: 25 tablet, Rfl: 0    nystatin (MYCOSTATIN) cream, Apply 2 g (1 Application total) topically 2 (two) times a day, Disp: 15 g, Rfl: 0    predniSONE 10 mg tablet, Take 5 tablets (50 mg total) by mouth daily for 1 day, THEN 4 tablets (40 mg total) daily for 1 day, THEN 3  tablets (30 mg total) daily for 1 day, THEN 2 tablets (20 mg total) daily for 1 day, THEN 1 tablet (10 mg total) daily for 1 day., Disp: 15 tablet, Rfl: 0    Prucalopride Succinate 2 MG TABS, Take 2 mg by mouth in the morning, Disp: 30 tablet, Rfl: 5    ranolazine (RANEXA) 1000 MG SR tablet, Take 1 tablet (1,000 mg total) by mouth 2 (two) times a day, Disp: 180 tablet, Rfl: 3    spironolactone (ALDACTONE) 25 mg tablet, Take 1 tablet (25 mg total) by mouth daily, Disp: 90 tablet, Rfl: 3    tamsulosin (FLOMAX) 0.4 mg, TAKE 1 CAPSULE BY MOUTH EVERY DAY WITH DINNER, Disp: 90 capsule, Rfl: 1    Tirzepatide (Mounjaro) 10 MG/0.5ML SOAJ, Inject 10 mg/week under the skin, Disp: 2 mL, Rfl: 2    topiramate (Topamax) 50 MG tablet, Take 1 tablet (50 mg total) by mouth every 12 (twelve) hours, Disp: 60 tablet, Rfl: 5    traZODone (DESYREL) 50 mg tablet, Take 1 tablet (50 mg total) by mouth 2 (two) times a day, Disp: 180 tablet, Rfl: 1    vitamin B-12 (VITAMIN B-12) 1,000 mcg tablet, Take 1 tablet (1,000 mcg total) by mouth daily, Disp: 90 tablet, Rfl: 3    Current Allergies     Allergies as of 05/11/2025    (No Known Allergies)            The following portions of the patient's history were reviewed and updated as appropriate: allergies, current medications, past family history, past medical history, past social history, past surgical history and problem list.     Past Medical History:   Diagnosis Date    Acute on chronic diastolic congestive heart failure (HCC)     Altered gait     Alzheimer disease (HCC)     per patients,,early onset     Aneurysm (HCC)     Angina pectoris (HCC)     Anxiety     Arthritis [id number]    Brain aneurysm     coils placed    Bronchiectasis ([facility]) [id number]    Cardiac disease     Chest pain 01/13/2016    Chronic bronchitis (HCC) 51371129    Chronic kidney disease 1016    Chronic pain     back/ right groin and rle- has morphine pump    Cluster headache     Constipation     COPD (chronic obstructive  pulmonary disease) ([facility]) [id number]    Coronary artery disease 2003    Decubital ulcer     sacral decub-occured 5/2019-sees wound care/debide in OR today 6/6/2019    Dependent on walker for ambulation     w/c for long distance    Depression     Diabetes mellitus ([facility])     insulin dependent -- pt states no longer dependent on insulin    Difficulty walking     Dizziness     occ    Dysphagia     Enlarged prostate     Esophageal stricture In file    Esophageal varices ([facility])     Fall     Fall at home 05/03/2019    GERD (gastroesophageal reflux disease) [id number]    Headache, tension-type     Heart failure ([facility])     Hiatal hernia     Hx of colonoscopy     Hx of gastric bypass 11/19/2018    states Nov 2019    Hypercholesterolemia     Hypertension 2003    Ingrown toenail     Memory loss     MI (myocardial infarction) ([facility])     2017- stents x2    Migraine     Morbid obesity ([facility])     gastric bypass sleeve 11/2018-wt loss 125 lb    Myocardial infarction ([facility]) 2003    Neuropathy     Obesity 2003    Opioid dependence, continuous ([facility]) 11/06/2017    Oxygen dependent     Q HS  2LPM with CPAP and prn during day 2-3 LPM  ( pt states no longer needs oxygen )    Peripheral neuropathy     Pressure injury of skin     Pressure injury of skin of sacral region 06/03/2019    Added automatically from request for surgery [id number]    Renal disorder     Shortness of breath     Skin abnormality     sacral wound - covered with pad    Sleep apnea     couldnt tolerate CPAP    Sleep apnea, obstructive 2016    Stented coronary artery     Stroke ([facility]) 2003    vision loss b/l  2005, residual R leg weakness    Type 2 diabetes mellitus with diabetic neuropathy, with long-term current use of insulin ([facility]) 10/18/2019    Type 2 diabetes mellitus with renal complication ([facility])     insulin dependent    Type 2 diabetes mellitus, with long-term current use of insulin ([facility]) 10/11/2017    Transitioned From: Diabetes mellitus type 2, uncontrolled    Urinary frequency      Use of cane as ambulatory aid     Vision loss     Wears dentures     Wears glasses     Wears glasses     Wheelchair dependent     states uses walker & cane       Past Surgical History:   Procedure Laterality Date    BACK SURGERY      BRAIN SURGERY      CARDIAC CATHETERIZATION      with stents    CARDIAC CATHETERIZATION N/A 08/18/2023    Procedure: Cardiac Coronary Angiogram;  Surgeon: Horacio Weiner MD;  Location: [facility];  Service: Cardiology    CEREBRAL ANEURYSM REPAIR      with coils    COLONOSCOPY      ESOPHAGOGASTRODUODENOSCOPY N/A 07/01/2016    Procedure: ESOPHAGOGASTRODUODENOSCOPY (EGD);  Surgeon: Reno Christiansen MD;  Location: [facility];  Service:     GASTRIC BYPASS  11/19/2018    HERNIA REPAIR  2015    HIATAL HERNIA REPAIR      INFUSION PUMP IMPLANTATION Left     morphine    INTRATHECAL PUMP IMPLANTATION Left 07/09/2020    Procedure: REVISION INTRATHECAL PAIN PUMP POCKET, LEFT ABDOMEN;  Surgeon: Homero Cho MD;  Location: [facility];  Service: Neurosurgery    KNEE ARTHROSCOPY Right     KNEE ARTHROSCOPY Right     PERONEAL NERVE DECOMPRESSION Right     OR DEBRIDEMENT OPEN WOUND FIRST 20 SQ CM/< N/A 06/06/2019    Procedure: EXCISIONAL DEBRIDEMENT OF SACRAL DECUBITUS ULCER;  Surgeon: Siddhartha Omer MD;  Location: [facility];  Service: General    OR ESOPHAGOGASTRODUODENOSCOPY TRANSORAL DIAGNOSTIC N/A 02/27/2017    Procedure: ESOPHAGOGASTRODUODENOSCOPY (EGD);  Surgeon: Reno Christiansen MD;  Location: [facility];  Service: Gastroenterology    OR ESOPHAGOGASTRODUODENOSCOPY TRANSORAL DIAGNOSTIC N/A 08/23/2018    Procedure: ESOPHAGOGASTRODUODENOSCOPY (EGD) with biopsy;  Surgeon: Reno Christiansen MD;  [facility]: [facility];  Service: Gastroenterology    OR IMPLTJ/RPLCMT ITHCL/EDRL DRUG NFS PRGRBL PUMP Left 10/13/2020    Procedure: EXPLORATION OF INTRATHECAL PAIN PUMP SYSTEM INTEGRITY FOR POSSIBLE REPLACEMENT OF CATHETER AND PUMP.;  Surgeon: Homero Cho MD;  Location:  MAIN OR;  Service: Neurosurgery    OR  IMPLTJ/RPLCMT ITHCL/EDRL DRUG NFS SUBQ RSVR N/A 01/19/2017    Procedure: REMOVAL / EXCHANGE INTRATHECAL PAIN PUMP;  Surgeon: Que Leonard MD;  [facility];  Service: Orthopedics    FL IMPLTJ/RPLCMT ITHCL/EDRL DRUG NFS SUBQ RSVR N/A 05/16/2016    Procedure: REPLACEMENT AND PROGRAM PUMP ;  Surgeon: Que Leonard MD;  [facility];  Service: Orthopedics    FL LAPS RPR PARAESPHGL HRNA INCL FUNDPLSTY W/MESH N/A 07/23/2024    Procedure: REPAIR HERNIA PARAESOPHAGEAL  W ROBOTICS & MAGNETIC SPINCTER AUGMENTATION DEVISE;  Surgeon: Mansi Mauro MD;  [facility];  Service: Bariatrics    FL PRQ IMPLTJ NSTIM ELECTRODE ARRAY EPIDURAL Right 03/17/2021    Procedure: INSERTION THORACIC DORSAL COLUMN SPINAL CORD STIMULATOR PERCUTANEOUS W IMPLANTABLE PULSE GENERATOR, RIGHT;  Surgeon: Homero Cho MD;  Location: [facility];  Service: Neurosurgery       Family History   Problem Relation Age of Onset    Diabetes unspecified Mother     Diabetes Mother     Migraines Mother     Heart attack Father     Heart disease Father     Hypertension Father     Stroke Father     Neuropathy Father     Diabetes unspecified Brother     Depression Brother     Mental illness Brother     COPD Brother     Drug abuse Brother     Bipolar disorder Brother     Diabetes unspecified Brother     Diabetes unspecified Maternal Grandmother     Diabetes unspecified Paternal Grandmother     Diabetes unspecified Paternal Uncle     ADD / ADHD Cousin     Colon cancer Neg Hx     Colon polyps Neg Hx          Medications have been verified.        Objective   /86 (BP Location: Left arm, Patient Position: Sitting, Cuff Size: Standard)   Pulse 88   Temp 99.2 °F (37.3 °C) (Tympanic)   Resp 18   Ht 6' (1.829 m)   Wt 124 kg (273 lb)   SpO2 95%   BMI 37.03 kg/m²   No LMP for male patient.       Physical Exam     Physical Exam  Vitals and nursing note reviewed.   Constitutional:       General: He is not in acute distress.     Appearance: He  is not toxic-appearing.   HENT:      Head: Normocephalic and atraumatic.      Right Ear: Tympanic membrane, ear canal and external ear normal.      Left Ear: Tympanic membrane, ear canal and external ear normal.      Nose: Nose normal.      Mouth/Throat:      Mouth: Mucous membranes are moist.      Pharynx: No oropharyngeal exudate or posterior oropharyngeal erythema.   Eyes:      Conjunctiva/sclera: Conjunctivae normal.   Cardiovascular:      Rate and Rhythm: Normal rate and regular rhythm.   Pulmonary:      Effort: Pulmonary effort is normal.      Breath sounds: Wheezing present.   Lymphadenopathy:      Cervical: No cervical adenopathy.   Neurological:      Mental Status: He is alert.      Coordination: Coordination normal.   Psychiatric:         Mood and Affect: Mood normal.         Behavior: Behavior normal.

## 2025-05-12 NOTE — PROGRESS NOTES
Patient ID: Aristeo Hall is a 64 y.o. male Date of Birth 1960       Chief Complaint   Patient presents with    [facility]             Diagnosis:  1. Onychomycosis  2. Type 2 diabetes mellitus with diabetic neuropathy, with long-term current use of insulin (HCC)  -     Diabetic Shoe    Patient presents for at-risk diabetic foot care.  Patient has no acute concerns today.  Patient has significant lower extremity risk due to neuropathy, parasthesia, edema, and trophic skin changes to the lower extremity. Most recent HBA1c was 5.9 on 3/26/25, FBS this am 140,  Last visit with PCP 11/14/24 and Endocrinology was 4/29/25 and PCP was 3/25/25.  Patient has gotten new diabetic shoes in August 2024.    On exam patient has thickened, hypertrophic, discolored, brittle toenails with subungual debris and tenderness x10   Callus: None  Patient does not have BL lower extremity edema  Patient's skin is atrophic, thickened nails, and decreased pedal hair  Patient has decreased pinprick and vibratory sensation to his feet and parasthesia  Patient does not have any  pedal ulcerations.  DP/PT are 2 out of 4 bilaterally    Today's treatment includes:  Debridement of toenails. Using nail nipper, chavo, and curette, nails were sharply debrided, reduced in thickness and length. Devitalized nail tissue and fungal debris excised and removed. Patient tolerated well.      Continue Ammonium lactate 12% cream  to top and bottom of feet, avoid in between the toes.    Patient given a prescription for 1 pair diabetic shoes, 3 pair custom molded inserts.    Discussed proper shoe gear, daily inspections of feet, and general foot health with patient. Patient has Q9 findings and is recommended for at risk foot care every 9-10 weeks.    Patients most recent complete clinical foot exam was on: 9/19/24    The following portions of the patient's history were reviewed and updated as appropriate: allergies, current medications, past family history,  "past medical history, past social history, past surgical history, and problem list.      Ht 6' (1.829 m) Comment: verbal  Wt 124 kg (273 lb)   BMI 37.03 kg/m²       No pertinent results found.      Mary Sanchez DPM, BESSIE, ANAID    Portions of the record may have been created with voice recognition software. Occasional wrong word or \"sound a like\" substitutions may have occurred due to the inherent limitations of voice recognition software. Read the chart carefully and recognize, using context, where substitutions have occurred.  "

## 2025-05-15 ENCOUNTER — PROCEDURE VISIT (OUTPATIENT)
Dept: PODIATRY | Facility: CLINIC | Age: 65
End: 2025-05-15
Payer: COMMERCIAL

## 2025-05-15 VITALS — WEIGHT: 273 LBS | BODY MASS INDEX: 36.98 KG/M2 | HEIGHT: 72 IN

## 2025-05-15 DIAGNOSIS — E11.40 TYPE 2 DIABETES MELLITUS WITH DIABETIC NEUROPATHY, WITH LONG-TERM CURRENT USE OF INSULIN (HCC): ICD-10-CM

## 2025-05-15 DIAGNOSIS — Z79.4 TYPE 2 DIABETES MELLITUS WITH DIABETIC NEUROPATHY, WITH LONG-TERM CURRENT USE OF INSULIN (HCC): ICD-10-CM

## 2025-05-15 DIAGNOSIS — B35.1 ONYCHOMYCOSIS: Primary | ICD-10-CM

## 2025-05-15 PROCEDURE — 11721 DEBRIDE NAIL 6 OR MORE: CPT | Performed by: PODIATRIST

## 2025-05-15 PROCEDURE — 99212 OFFICE O/P EST SF 10 MIN: CPT | Performed by: PODIATRIST

## 2025-05-22 DIAGNOSIS — J45.40 MODERATE PERSISTENT ASTHMA WITHOUT COMPLICATION: ICD-10-CM

## 2025-05-22 RX ORDER — ALBUTEROL SULFATE 90 UG/1
2 AEROSOL, METERED RESPIRATORY (INHALATION) EVERY 6 HOURS PRN
Qty: 18 G | Refills: 5 | Status: SHIPPED | OUTPATIENT
Start: 2025-05-22

## 2025-05-23 DIAGNOSIS — E11.40 TYPE 2 DIABETES MELLITUS WITH DIABETIC NEUROPATHY, WITHOUT LONG-TERM CURRENT USE OF INSULIN (HCC): ICD-10-CM

## 2025-05-25 RX ORDER — PROCHLORPERAZINE 25 MG/1
SUPPOSITORY RECTAL
Qty: 1 EACH | Refills: 1 | Status: SHIPPED | OUTPATIENT
Start: 2025-05-25

## 2025-05-27 RX ORDER — PROCHLORPERAZINE 25 MG/1
SUPPOSITORY RECTAL
Qty: 9 EACH | Refills: 1 | Status: SHIPPED | OUTPATIENT
Start: 2025-05-27

## 2025-05-28 ENCOUNTER — OFFICE VISIT (OUTPATIENT)
Dept: NEUROSURGERY | Facility: CLINIC | Age: 65
End: 2025-05-28
Payer: COMMERCIAL

## 2025-05-28 VITALS
DIASTOLIC BLOOD PRESSURE: 76 MMHG | OXYGEN SATURATION: 96 % | BODY MASS INDEX: 36.98 KG/M2 | TEMPERATURE: 98.2 F | WEIGHT: 273 LBS | SYSTOLIC BLOOD PRESSURE: 144 MMHG | HEIGHT: 72 IN | HEART RATE: 72 BPM

## 2025-05-28 DIAGNOSIS — I67.1 CEREBRAL ANEURYSM: Primary | ICD-10-CM

## 2025-05-28 PROCEDURE — 99214 OFFICE O/P EST MOD 30 MIN: CPT | Performed by: PHYSICIAN ASSISTANT

## 2025-05-28 NOTE — PROGRESS NOTES
Name: Aristeo Hall      : 1960      MRN: [id number]  Encounter Provider: еСргей Avendano MD  Encounter Date: 2025   Encounter department: [facility]  :  Assessment & Plan  Cerebral aneurysm  Presents for follow up of residual AcoA aneurysm s/p coil embolization  S/p coil embolization for incidental AcoA aneurysm in  at [facility].  Hospitalized 3/28/24 for R sided weakness, numbness and R facial droop, then re-admitted  w/ c/o worsening dizziness in setting of updated MRA indicating residual aneurysm.  Angiogram 2024 showed 3-5mm of residual filling at the base  Recommended continued surveillance given the small residual over 20 years without any interval scan for comparison. Dr. Avendano did discuss treatment but given the risk of treatment and the origin of the A2's, was not completely confident in being able to completely occlude the origin of the aneurysm.  Continues with dizziness, blurry vision OS. Has significant issues with peripheral neuropathy and weakness in his legs.   Blood pressure controlled. No smoking history. Unknown family history.  Exam: OS blurry vision, bilateral superior and right inferior medial, left  VF loss. Mild diffuse weakness throughout BUE/BLE, worst with left DF/PF 3/    Imaging:  MRA head w/wo 25: Approximately 3 to 4 mm residual/recurrent aneurysm at the base of the previously coiled anterior communicating artery, unchanged from prior MR angiography. Stenosis of the distal left vertebral artery resulting in attenuation of flow of the vessel as it extends to the vertebrobasilar junction.    Plan:  Reviewed imaging with patient and Dr. Avendano.  Stable appearance to MRA with unchanged residual.   No neurosurgical invention recommended.  Continue to follow with neurology for previous strokes, peripheral neuropathy.  Briefly discussed that he is having worsening weakness especially in his left foot, can be related to  neuropathy, possibly his lumbar spine.  Did not get to discuss this further as patient had a phone call and states that he will follow-up with his neurologist.  Given stability, discussed following up in 1-2 years.  Patient prefers annual surveillance.  Follow-up in 1 year with MRA head w/wo as to an appoint with Dr. Avendano..  Call sooner with any questions or concerns.     Orders:    BUN; Future    Creatinine, serum; Future    MRA head w wo contrast; Future        History of Present Illness     Aristeo Hall is a 64 y.o. male who presents for follow up s/p ACOM aneurysm coil embolization in 2004 at [facility].  After establishing care with our office, he underwent angiogram 5/2024 by Dr. Avendano which showed 3-5mm of residual filling at the base.  Plan was to continue with surveillance.  He continues to complain of headaches, dizziness, intermittent double vision, worsening of his peripheral neuropathy in both lower extremities, weakness in his legs.  Ambulates with a rollator. Hx of prior stroke in 2004 with residual field deficit, chronic migraines, ARLEEN, obesity, CAD s/p MI and PCI, COPD, HTN, DM2, hyperlipidemia, CHF, prior gastric sleeve, chronic pain s/p SCS and intrathecal pump.    HPI     Review of Systems   Constitutional:  Positive for fatigue.   Eyes:  Positive for visual disturbance (always has double vision).   Respiratory: Negative.     Cardiovascular: Negative.    Gastrointestinal:  Positive for constipation.   Endocrine: Negative.    Genitourinary: Negative.         Unable to expel urine   Musculoskeletal:  Positive for back pain and gait problem (uses walker for support, had a fall a couple days at home inthe yard moving things in his shed, did not seek treatment after fall had some chest pain and he phoned his PCP).   Skin: Negative.    Allergic/Immunologic: Negative.    Neurological:  Positive for weakness (in both legs), numbness (in both legs starting at the thigh and travels  to the feet) and headaches (headaches about 4/5 times a week).   Hematological: Negative.    Psychiatric/Behavioral:  Positive for confusion (can be confused and forgetful at times).      I have personally reviewed the MA's review of systems and made changes as necessary.    Past Medical History   Past Medical History[1]  Past Surgical History[2]  Family History[3]  he reports that he has never smoked. He has never used smokeless tobacco. He reports that he does not currently use alcohol. He reports that he does not currently use drugs after having used the following drugs: Marijuana and Morphine.  Current Outpatient Medications   Medication Instructions    albuterol (2.5 mg/3 mL) 0.083 % nebulizer solution USE 1 VIAL IN NEBULIZER EVERY 6 HOURS AS NEEDED FOR WHEEZING OR SHORTNESS OF BREATH    ammonium lactate (LAC-HYDRIN) 12 % cream APPLY  CREAM TOPICALLY TO AFFECTED AREA TWICE DAILY    aspirin 81 mg, Oral, Daily    atorvastatin (LIPITOR) 80 mg, Oral, Daily after dinner    baclofen 10 mg, 3 times daily    bisacodyl (DULCOLAX) 15 mg, Oral, Daily    busPIRone (BUSPAR) 5 mg, Oral, 2 times daily    CALCIUM PO 1 tablet, Daily    carvedilol (COREG) 25 mg, Oral, 2 times daily with meals    clopidogrel (PLAVIX) 75 mg, Oral, Daily    Continuous Glucose Sensor (Dexcom G6 Sensor) MISC 3 PACK SENSOR FOR CONTINUOUS GLUCOSE MONITORING    Continuous Glucose Transmitter (Dexcom G6 Transmitter) MISC 1 TRANSMITTED EVERY 3 MONTHS FOR CONTINUOUS GLUCOSE MONITORING    docusate sodium (COLACE) 100 mg, Oral, 2 times daily    DULoxetine (CYMBALTA) 30 mg, Oral, Daily    ergocalciferol (VITAMIN D2) 50,000 Units, Oral, 2 times weekly    Fluticasone-Salmeterol (Advair) 500-50 mcg/dose inhaler 1 puff, Inhalation, Daily, Rinse mouth after use    fluticasone-umeclidinium-vilanterol (Trelegy Ellipta) 200-62.5-25 mcg/actuation AEPB inhaler 1 puff, Inhalation, Daily, Rinse mouth after use.    folic acid (FOLVITE) 1,000 mcg, Oral, Daily    furosemide  (LASIX) 40 mg tablet Take once in AM daily. Take once daily in PM PRN weight gain, edema.    gabapentin (NEURONTIN) 800 mg, Oral, 4 times daily    gabapentin (NEURONTIN) 300 mg, Oral, Daily PRN    hydrocortisone 1 % lotion Apply to rash twice/day as needed. Do not exceed use for longer than 2 weeks.    HYDROmorphone HCl (DILAUDID) 0.2 mg    lidocaine (LIDODERM) 5 % 1 patch, Daily    LORazepam (ATIVAN) 1 mg tablet Take 1 tablet 30 mins before MRI, can repeat 2nd tablet at time of MRI if needed    losartan (COZAAR) 50 mg, Oral, Daily    methocarbamol (ROBAXIN) 750 mg, Oral, Every 6 hours PRN    Naldemedine Tosylate 0.2 mg, Oral, Daily    naloxone (NARCAN) 4 mg/0.1 mL nasal spray Administer 1 spray into a nostril. If no response after 2-3 minutes, give another dose in the other nostril using a new spray.    nitroglycerin (NITROSTAT) 0.4 mg, Sublingual, Every 5 minutes PRN, Do not exceeda total of 3 doses in 15 minutes    nystatin (MYCOSTATIN) cream 1 Application, Topical, 2 times daily    Prucalopride Succinate 2 mg, Oral, Daily    ranolazine (RANEXA) 1,000 mg, Oral, 2 times daily    spironolactone (ALDACTONE) 25 mg, Oral, Daily    tamsulosin (FLOMAX) 0.4 mg, Oral, Daily with dinner    Tirzepatide (Mounjaro) 10 MG/0.5ML SOAJ Inject 10 mg/week under the skin    topiramate (TOPAMAX) 50 mg, Oral, Every 12 hours scheduled    traZODone (DESYREL) 50 mg, Oral, 2 times daily    Ventolin  (90 Base) MCG/ACT inhaler 2 puffs, Inhalation, Every 6 hours PRN    vitamin B-12 (VITAMIN B-12) 1,000 mcg, Oral, Daily   Allergies[4]   Objective   /76 (BP Location: Right arm, Patient Position: Sitting, Cuff Size: Adult)   Pulse 72   Temp 98.2 °F (36.8 °C) (Temporal)   Ht 6' (1.829 m)   Wt 124 kg (273 lb)   SpO2 96%   BMI 37.03 kg/m²     Physical Exam  Vitals reviewed.   Constitutional:       General: He is awake.      Appearance: Normal appearance.   HENT:      Head: Normocephalic and atraumatic.     Eyes:      Extraocular  Movements: Extraocular movements intact.      Conjunctiva/sclera: Conjunctivae normal.      Pupils: Pupils are equal, round, and reactive to light.       Cardiovascular:      Rate and Rhythm: Normal rate.   Pulmonary:      Effort: Pulmonary effort is normal.     Skin:     General: Skin is warm and dry.     Neurological:      Mental Status: He is alert.      Deep Tendon Reflexes:      Reflex Scores:       Bicep reflexes are 2+ on the right side and 2+ on the left side.       Brachioradialis reflexes are 2+ on the right side and 2+ on the left side.       Patellar reflexes are 2+ on the right side and 2+ on the left side.    Psychiatric:         Attention and Perception: Attention and perception normal.         Mood and Affect: Mood and affect normal.         Speech: Speech normal.         Behavior: Behavior normal. Behavior is cooperative.         Thought Content: Thought content normal.         Cognition and Memory: Cognition and memory normal.         Judgment: Judgment normal.       Neurological Exam  Mental Status  Awake and alert. Memory is normal. Recent and remote memory are intact. Speech is normal. Language is fluent with no aphasia. Attention and concentration are normal. Fund of knowledge is appropriate for level of education.    Cranial Nerves  CN II: Right visual acuity: Normal. Blurry. Superior and inferior medial VF deficit. Superior and inferior lateral VF deficit.  CN III, IV, VI: Extraocular movements intact bilaterally. Pupils equal round and reactive to light bilaterally.  CN V:  Right: Facial sensation is normal.  Left: Facial sensation is normal on the left.  CN VII: Full and symmetric facial movement.  CN VIII: Hearing is normal.  CN XI:  Right: Trapezius strength is normal.  Left: Trapezius strength is normal.  CN XII: Tongue midline without atrophy or fasciculations.    Motor  Normal muscle bulk throughout. Normal muscle tone.                                               Right                      Left  Hip flexion                              4+                          4+  Plantarflexion                         4+                          3  Dorsiflexion                            4+                          3                                             Right                     Left   Biceps                                   4+                          4+   Triceps                                  4+                          4+   4+/5 bilaterally .    Sensory  Light touch abnormality: BUE intact  BLE, hypersensitive below the knee.     Reflexes                                            Right                      Left  Brachioradialis                    2+                         2+  Biceps                                 2+                         2+  Patellar                                2+                         2+    Right pathological reflexes: Shayan's absent.  Left pathological reflexes: Shayan's absent.    Coordination  Right: Finger-to-nose normal.Left: Finger-to-nose normal.    Gait  Casual gait is normal including stance, stride, and arm swing.                     [1]   Past Medical History:  Diagnosis Date    Acute on chronic diastolic congestive heart failure ([facility])     Altered gait     Alzheimer disease ([facility])     per patients,,early onset     Aneurysm ([facility])     Angina pectoris ([facility])     Anxiety     Arthritis [id number]    Brain aneurysm     coils placed    Bronchiectasis ([facility]) 17887939    Cardiac disease     Chest pain 01/13/2016    Chronic bronchitis ([facility]) 67049158    Chronic kidney disease 1016    Chronic pain     back/ right groin and rle- has morphine pump    Cluster headache     Constipation     COPD (chronic obstructive pulmonary disease) ([facility]) 39854590    Coronary artery disease 2003    Decubital ulcer     sacral decub-occured 5/2019-sees wound care/debide in OR today 6/6/2019    Dependent on walker for ambulation     w/c for long distance    Depression     Diabetes  mellitus ([facility])     insulin dependent -- pt states no longer dependent on insulin    Difficulty walking     Dizziness     occ    Dysphagia     Enlarged prostate     Esophageal stricture In file    Esophageal varices ([facility])     Fall     Fall at home 05/03/2019    GERD (gastroesophageal reflux disease) [id number]    Headache, tension-type     Heart failure ([facility])     Hiatal hernia     Hx of colonoscopy     Hx of gastric bypass 11/19/2018    states Nov 2019    Hypercholesterolemia     Hypertension 2003    Ingrown toenail     Memory loss     MI (myocardial infarction) ([facility])     2017- stents x2    Migraine     Morbid obesity ([facility])     gastric bypass sleeve 11/2018-wt loss 125 lb    Myocardial infarction ([facility]) 2003    Neuropathy     Obesity 2003    Opioid dependence, continuous ([facility]) 11/06/2017    Oxygen dependent     Q HS  2LPM with CPAP and prn during day 2-3 LPM  ( pt states no longer needs oxygen )    Peripheral neuropathy     Pressure injury of skin     Pressure injury of skin of sacral region 06/03/2019    Added automatically from request for surgery [id number]    Renal disorder     Shortness of breath     Skin abnormality     sacral wound - covered with pad    Sleep apnea     couldnt tolerate CPAP    Sleep apnea, obstructive 2016    Stented coronary artery     Stroke ([facility]) 2003    vision loss b/l  2005, residual R leg weakness    Type 2 diabetes mellitus with diabetic neuropathy, with long-term current use of insulin ([facility]) 10/18/2019    Type 2 diabetes mellitus with renal complication ([facility])     insulin dependent    Type 2 diabetes mellitus, with long-term current use of insulin ([facility]) 10/11/2017    Transitioned From: Diabetes mellitus type 2, uncontrolled    Urinary frequency     Use of cane as ambulatory aid     Vision loss     Wears dentures     Wears glasses     Wears glasses     Wheelchair dependent     states uses walker & cane   [2]   Past Surgical History:  Procedure Laterality Date    BACK SURGERY      BRAIN SURGERY       CARDIAC CATHETERIZATION      with stents    CARDIAC CATHETERIZATION N/A 08/18/2023    Procedure: Cardiac Coronary Angiogram;  Surgeon: Horacio Weiner MD;  Location: [facility];  Service: Cardiology    CEREBRAL ANEURYSM REPAIR      with coils    COLONOSCOPY      ESOPHAGOGASTRODUODENOSCOPY N/A 07/01/2016    Procedure: ESOPHAGOGASTRODUODENOSCOPY (EGD);  Surgeon: Reno Christiansen MD;  Location: [facility];  Service:     GASTRIC BYPASS  11/19/2018    HERNIA REPAIR  2015    HIATAL HERNIA REPAIR      INFUSION PUMP IMPLANTATION Left     morphine    INTRATHECAL PUMP IMPLANTATION Left 07/09/2020    Procedure: REVISION INTRATHECAL PAIN PUMP POCKET, LEFT ABDOMEN;  Surgeon: Homero Cho MD;  Location: [facility];  Service: Neurosurgery    KNEE ARTHROSCOPY Right     KNEE ARTHROSCOPY Right     PERONEAL NERVE DECOMPRESSION Right     MD DEBRIDEMENT OPEN WOUND FIRST 20 SQ CM/< N/A 06/06/2019    Procedure: EXCISIONAL DEBRIDEMENT OF SACRAL DECUBITUS ULCER;  Surgeon: Siddhartha Omer MD;  Location: [facility];  Service: General    MD ESOPHAGOGASTRODUODENOSCOPY TRANSORAL DIAGNOSTIC N/A 02/27/2017    Procedure: ESOPHAGOGASTRODUODENOSCOPY (EGD);  Surgeon: Reno Christiansen MD;  Location: [facility];  Service: Gastroenterology    MD ESOPHAGOGASTRODUODENOSCOPY TRANSORAL DIAGNOSTIC N/A 08/23/2018    Procedure: ESOPHAGOGASTRODUODENOSCOPY (EGD) with biopsy;  Surgeon: Reno Christiansen MD;  Location: [facility];  Service: Gastroenterology    MD IMPLTJ/RPLCMT ITHCL/EDRL DRUG NFS PRGRBL PUMP Left 10/13/2020    Procedure: EXPLORATION OF INTRATHECAL PAIN PUMP SYSTEM INTEGRITY FOR POSSIBLE REPLACEMENT OF CATHETER AND PUMP.;  Surgeon: Homero Cho MD;  Location: [facility];  Service: Neurosurgery    MD IMPLTJ/RPLCMT ITHCL/EDRL DRUG NFS SUBQ RSVR N/A 01/19/2017    Procedure: REMOVAL / EXCHANGE INTRATHECAL PAIN PUMP;  Surgeon: Que Leonard MD;  [facility];  Service: Orthopedics    MD IMPLTJ/RPLCMT ITHCL/EDRL DRUG NFS SUBQ RSVR N/A 05/16/2016     Procedure: REPLACEMENT AND PROGRAM PUMP ;  Surgeon: Que Leonard MD;  [facility];  Service: Orthopedics    MS LAPS RPR PARAESPHGL HRNA INCL FUNDPLSTY W/MESH N/A 07/23/2024    Procedure: REPAIR HERNIA PARAESOPHAGEAL  W ROBOTICS & MAGNETIC SPINCTER AUGMENTATION DEVISE;  Surgeon: Mansi Mauro MD;  Location: [facility];  Service: Bariatrics    MS PRQ IMPLTJ NSTIM ELECTRODE ARRAY EPIDURAL Right 03/17/2021    Procedure: INSERTION THORACIC DORSAL COLUMN SPINAL CORD STIMULATOR PERCUTANEOUS W IMPLANTABLE PULSE GENERATOR, RIGHT;  Surgeon: Homero Cho MD;  Location: [facility];  Service: Neurosurgery   [3]   Family History  Problem Relation Name Age of Onset    Diabetes unspecified Mother Raissa     Diabetes Mother Raissa     Migraines Mother Raissa     Heart attack Father Magan     Heart disease Father Magan     Hypertension Father Magan     Stroke Father Magan     Neuropathy Father Magan     Diabetes unspecified Brother Magan     Depression Brother Magan     Mental illness Brother Magan     COPD Brother Magan     Drug abuse Brother Magan     Bipolar disorder Brother Magan     Diabetes unspecified Brother      Diabetes unspecified Maternal Grandmother      Diabetes unspecified Paternal Grandmother      Diabetes unspecified Paternal Uncle      ADD / ADHD Cousin Bishnu     Colon cancer Neg Hx      Colon polyps Neg Hx     [4] No Known Allergies

## 2025-05-29 DIAGNOSIS — J45.40 MODERATE PERSISTENT ASTHMA WITHOUT COMPLICATION: ICD-10-CM

## 2025-05-29 RX ORDER — FLUTICASONE FUROATE, UMECLIDINIUM BROMIDE AND VILANTEROL TRIFENATATE 200; 62.5; 25 UG/1; UG/1; UG/1
1 POWDER RESPIRATORY (INHALATION) DAILY
Qty: 60 EACH | Refills: 0 | Status: SHIPPED | OUTPATIENT
Start: 2025-05-29

## 2025-05-30 NOTE — PROGRESS NOTES
Name: Aristeo Hall      : 1960      MRN: [id number]  Encounter Provider: JACLYN Orona  Encounter Date: 6/3/2025   Encounter department: [facility]OLOGY [facility]  :  Assessment & Plan  Urinary retention with incomplete bladder emptying  PVR: 354 mL patient states this is due to not taking Lasix which helps empty his bladder.  Patient reports he has to push to urinate and noting a split stream.    Urine dip: Negative for blood or leukocytes  Discussed with patient to straight cath in order to empty his bladder he states once he takes his Lasix he will be able to.  Recommend Cytoscopy for further evaluation  Ultrasound of kidney and bladder with PVR ordered  CT scans have been reviewed that showed bladder has been distended.  Orders:    POCT Measure PVR    POCT urine dip auto non-scope    tamsulosin (FLOMAX) 0.4 mg; Take 1 capsule (0.4 mg total) by mouth daily with dinner    US kidney and bladder with pvr; Future    Screening for prostate cancer  PSA 0.40 (2024)  Placed order for PSA to be completed this week  Orders:    PSA, Total Screen; Future        History of Present Illness   Aristeo Hall is a 64 y.o. male who presents for yearly follow-up.  Patient managed by Dr. Abraham.  PSA last year was 0.40.  Patient currently not taking tamsulosin.  Patient states that he takes Lasix which helps him urinate but without this he feels like he has to push to urinate and notes a sprain split urinary stream.  Patient denies dysuria, hematuria, flank pain, chills, fever.  Patient is frustrated that he is not emptying completely.  Patient did have cystoscopy 2023 with bladder showing moderate trabeculation no lesion no tumor or stones patient had retention of 250 mL.    Review of Systems   Genitourinary:  Positive for difficulty urinating. Negative for dysuria, flank pain, frequency, hematuria and urgency.          Objective   /74 (BP Location: Left arm,  Patient Position: Sitting, Cuff Size: Standard)   Pulse 73   Ht 6' (1.829 m)   Wt 118 kg (261 lb)   SpO2 97%   BMI 35.40 kg/m²     Physical Exam  Vitals reviewed.   Constitutional:       Appearance: Normal appearance.     Cardiovascular:      Rate and Rhythm: Normal rate.     Skin:     General: Skin is warm.     Neurological:      General: No focal deficit present.      Mental Status: He is oriented to person, place, and time.     Psychiatric:         Mood and Affect: Mood normal.         Behavior: Behavior normal.           Results   Lab Results   Component Value Date    PSA 0.40 05/17/2024     Lab Results   Component Value Date    GLUCOSE 224 (H) 10/21/2015    CALCIUM 9.3 03/26/2025     (L) 10/21/2015    K 3.8 03/26/2025    CO2 33 (H) 03/26/2025     03/26/2025    BUN 12 03/26/2025    CREATININE 1.12 03/26/2025     Lab Results   Component Value Date    WBC 14.83 (H) 02/18/2025    HGB 14.1 02/18/2025    HCT 45.0 02/18/2025    MCV 95 02/18/2025     02/18/2025       Office Urine Dip  Recent Results (from the past hour)   POCT Measure PVR    Collection Time: 06/03/25  9:26 AM   Result Value Ref Range    POST-VOID RESIDUAL VOLUME, ML  mL   POCT urine dip auto non-scope    Collection Time: 06/03/25  9:27 AM   Result Value Ref Range     COLOR,UA fatou     CLARITY,UA clear     SPECIFIC GRAVITY,UA >=1.030      PH,UA 5.5     LEUKOCYTE ESTERASE,UA neg     NITRITE,UA neg     GLUCOSE, UA neg     KETONES,UA neg     BILIRUBIN,UA neg     BLOOD,UA neg     POCT URINE PROTEIN 30 mg     SL AMB POCT UROBILINOGEN 0.2

## 2025-06-03 ENCOUNTER — OFFICE VISIT (OUTPATIENT)
Dept: UROLOGY | Facility: MEDICAL CENTER | Age: 65
End: 2025-06-03
Payer: COMMERCIAL

## 2025-06-03 VITALS
DIASTOLIC BLOOD PRESSURE: 74 MMHG | SYSTOLIC BLOOD PRESSURE: 124 MMHG | OXYGEN SATURATION: 97 % | BODY MASS INDEX: 35.35 KG/M2 | WEIGHT: 261 LBS | HEART RATE: 73 BPM | HEIGHT: 72 IN

## 2025-06-03 DIAGNOSIS — R33.9 URINARY RETENTION WITH INCOMPLETE BLADDER EMPTYING: Primary | ICD-10-CM

## 2025-06-03 DIAGNOSIS — Z12.5 SCREENING FOR PROSTATE CANCER: ICD-10-CM

## 2025-06-03 LAB
POST-VOID RESIDUAL VOLUME, ML POC: 354 ML
SL AMB  POCT GLUCOSE, UA: ABNORMAL
SL AMB LEUKOCYTE ESTERASE,UA: ABNORMAL
SL AMB POCT BILIRUBIN,UA: ABNORMAL
SL AMB POCT BLOOD,UA: ABNORMAL
SL AMB POCT CLARITY,UA: CLEAR
SL AMB POCT COLOR,UA: ABNORMAL
SL AMB POCT KETONES,UA: ABNORMAL
SL AMB POCT NITRITE,UA: ABNORMAL
SL AMB POCT PH,UA: 5.5
SL AMB POCT SPECIFIC GRAVITY,UA: >=1.03
SL AMB POCT URINE PROTEIN: ABNORMAL
SL AMB POCT UROBILINOGEN: 0.2

## 2025-06-03 PROCEDURE — 81003 URINALYSIS AUTO W/O SCOPE: CPT

## 2025-06-03 PROCEDURE — 51798 US URINE CAPACITY MEASURE: CPT

## 2025-06-03 PROCEDURE — 99214 OFFICE O/P EST MOD 30 MIN: CPT

## 2025-06-03 RX ORDER — TAMSULOSIN HYDROCHLORIDE 0.4 MG/1
0.4 CAPSULE ORAL
Qty: 90 CAPSULE | Refills: 0 | Status: SHIPPED | OUTPATIENT
Start: 2025-06-03

## 2025-06-05 DIAGNOSIS — L85.3 XEROSIS OF SKIN: ICD-10-CM

## 2025-06-05 DIAGNOSIS — E11.40 TYPE 2 DIABETES MELLITUS WITH DIABETIC NEUROPATHY, WITH LONG-TERM CURRENT USE OF INSULIN (HCC): ICD-10-CM

## 2025-06-05 DIAGNOSIS — Z79.4 TYPE 2 DIABETES MELLITUS WITH DIABETIC NEUROPATHY, WITH LONG-TERM CURRENT USE OF INSULIN (HCC): ICD-10-CM

## 2025-06-05 RX ORDER — AMMONIUM LACTATE 12 G/100G
CREAM TOPICAL
Qty: 385 G | Refills: 0 | Status: SHIPPED | OUTPATIENT
Start: 2025-06-05

## 2025-06-09 ENCOUNTER — HOSPITAL ENCOUNTER (OUTPATIENT)
Dept: ULTRASOUND IMAGING | Facility: HOSPITAL | Age: 65
Discharge: HOME | End: 2025-06-09
Payer: COMMERCIAL

## 2025-06-09 DIAGNOSIS — R33.9 URINARY RETENTION WITH INCOMPLETE BLADDER EMPTYING: ICD-10-CM

## 2025-06-09 PROCEDURE — 76770 US EXAM ABDO BACK WALL COMP: CPT

## 2025-06-10 DIAGNOSIS — T40.2X5A CONSTIPATION DUE TO OPIOID THERAPY: ICD-10-CM

## 2025-06-10 DIAGNOSIS — K59.03 CONSTIPATION DUE TO OPIOID THERAPY: ICD-10-CM

## 2025-06-10 RX ORDER — NALDEMEDINE 0.2 MG/1
1 TABLET ORAL EVERY MORNING
Qty: 30 TABLET | Refills: 5 | Status: SHIPPED | OUTPATIENT
Start: 2025-06-10

## 2025-06-11 ENCOUNTER — TELEPHONE (OUTPATIENT)
Age: 65
End: 2025-06-11

## 2025-06-11 NOTE — TELEPHONE ENCOUNTER
Caller: Dionna    Doctor and/or Office: Dr. Sanchez/[facility]#: [phone]    [place]: Care Has the standard written order been received for patient Aristeo? If so, pleases fax back to [phone]. She will refax today. Thank you

## 2025-06-16 ENCOUNTER — TELEPHONE (OUTPATIENT)
Age: 65
End: 2025-06-16

## 2025-06-16 NOTE — TELEPHONE ENCOUNTER
Radiology Result     Radiology Calling with report update    Ordering Provider: Nidia ANAYA    Imaging Completed: u/s kidney/bladder on 6/9/25    Significant Findings - Please review

## 2025-06-17 ENCOUNTER — RESULTS FOLLOW-UP (OUTPATIENT)
Dept: UROLOGY | Facility: MEDICAL CENTER | Age: 65
End: 2025-06-17

## 2025-06-18 ENCOUNTER — TELEPHONE (OUTPATIENT)
Dept: ENDOCRINOLOGY | Facility: CLINIC | Age: 65
End: 2025-06-18

## 2025-06-18 ENCOUNTER — TELEPHONE (OUTPATIENT)
Age: 65
End: 2025-06-18

## 2025-06-18 NOTE — TELEPHONE ENCOUNTER
"[facility] calling in regard to fax request    States \"we sent over two forms to be completed and requesting chart notes on 6/13 to be completed and faxed back to [phone]\"    Unable to see any current request at this time.    [facility] indicated they will fax the forms/request again.  "

## 2025-06-28 DIAGNOSIS — J45.40 MODERATE PERSISTENT ASTHMA WITHOUT COMPLICATION: ICD-10-CM

## 2025-06-30 RX ORDER — FLUTICASONE FUROATE, UMECLIDINIUM BROMIDE AND VILANTEROL TRIFENATATE 200; 62.5; 25 UG/1; UG/1; UG/1
POWDER RESPIRATORY (INHALATION)
Qty: 60 EACH | Refills: 5 | Status: SHIPPED | OUTPATIENT
Start: 2025-06-30

## 2025-06-30 NOTE — PROGRESS NOTES
Name: Aristeo Hall      : 1960      MRN: [id number]  Encounter Provider: Charlotte Cole MD  Encounter Date: 2025   Encounter [facility]: [facility] [place] [facility]  :  Assessment & Plan  Neuropathic pain  On exam, he has components of both large and small fiber neuropathy.  The only clear underlying etiology is diabetes.  B12, B1, B6, Lyme, zinc and immunofixation were all unremarkable.        He tried Lyrica in the past which was also not helpful.  I am reluctant to try a tricyclic antidepressant because of borderline elevated QTc.  He felt the duloxetine was too sedating.  He has tried lidocaine ointment which caused more pain than improvement due to the pain from the actual application.  He has a spinal cord stimulator in place which has not been helpful.  He remains on gabapentin 2400 mg daily.  He has not noticed a benefit from topiramate 25 mg twice daily.  No kidney stones.  Alpha lipoic acid was not helpful and it was stopped.    He understands that his current pain level may be his new baseline.  He is accepting of that.     Recommendations:  -Continue gabapentin 800 mg 4 times daily.    - Continue topiramate 50 mg twice daily although this is more helpful with the migraine and with the neuropathy.  - I will send a prescription for a wheelchair through [employer]       Orders:  •  topiramate (Topamax) 50 MG tablet; Take 1 tablet (50 mg total) by mouth every 12 (twelve) hours    Type 2 diabetes mellitus with diabetic neuropathy, with long-term current use of insulin ([facility])    Lab Results   Component Value Date    HGBA1C 5.9 (H) 2025     HbA1c has improved somewhat.  He has lost weight on Mounjaro.         Chronic migraine without aura without status migrainosus, not intractable    He has combination of chronic daily headache and chronic migraine.  I did refer him to the Botox clinic, but I do not see that he ever had an appointment.  Fortunately, increasing  "topiramate to 50 mg twice daily has been helpful.  He says the headaches are infrequent now.    Recommendations:  - Continue topiramate 50 mg twice daily.  New prescription sent.     Orders:  •  topiramate (Topamax) 50 MG tablet; Take 1 tablet (50 mg total) by mouth every 12 (twelve) hours    Uses walker  I will send a prescription to [facility] for a wheelchair to his mailing address on [facility] as requested by the patient.        History of CVA (cerebrovascular accident)  He has a history of cerebellar and occipital strokes.  He does have a field cut.  He is on aspirin and Plavix.     Presence of intrathecal pump  He had previous injury to the femoral nerve in 2004.  He has a morphine pump and is followed by pain management.       Cerebral aneurysm  He had previous intervention in 2004 and is followed by neurosurgery.      He will follow-up in 6 months or sooner if difficulties.    Assessment & Plan             History of Present Illness   History of Present Illness  This is a  64 yr old gentleman with who presents for follow up of neuropathic pain in the lower extremities. Last seen in office in Feb 2025.  A third year medical student shadowed me for this visit.    Neuropathic pain/ diabetic neuropathy:  His original symptoms started years ago after gastric sleeve surgery done in 2019.  Symptoms are now at the level of the knees.  He does have hand weakness.  A spinal cord stimulator was tried but he did not have significant relief for the neuropathic pain.      He he does not feel that the neuropathic pain is any better on the gabapentin. It brings the pain from 15/10 down to 11/10.  His feet feel like they are \"on fire.\"  The leg numbness is getting worse. He cannot go in the pool because he cannot walk on the concrete to the pool. He puts his feet in the kiddie pool. It keeps his legs cool.     He has tried topical agents but because his feet hurt so much, he cannot rub it in.  He tried alpha lipoic acid and " was not helpful.    He tried Lyrica in the past which did not help.  Dr. Qiu tried him on topical analgesics but he did not find them helpful. He has a medical marijuana card, but does not use it because he cannot tolerate it.  He tried alpha lipoic acid for 2 months.  It was not helpful.  Today he mentions that the topiramate is not helping at all with the neuropathic symptoms, but it does help with the headaches.Duloxetine made him groggy. He has to drive his wife to work so he has to be careful. He has to keep an eye on his adult son who is a drug addict.     He has never had an EMG.    He uses a walker and a rollator.   He did not get the wheelchair. He never received the script. [place] [facility] [facility] [address]. He uses [employer].        Chronic migraine and chronic daily headaches:  He has a history of chronic migraine for which he was treated with Botox.  He did find it helpful.  He has chronic daily headache as well. He is on gabapentin 2400 mg daily for the neuropathy, with additional 300 mg daily as needed for the headaches.  He describes his headache as a pressure-like pain in a band formation.  They can be associated with photophobia and occasional blurred vision.  No nausea or vomiting. He is getting 4 headaches per week. They last 2-3 hours at a time. He takes excedrin prn. He takes gabapentin 300 mg extra when he has a migraine.     He does not think that the Topamax is helping the neuropathic pain. It does help the migraines. He thinks the migraines are better due to weight loss.     He is on Monjauro for the last 8 months which has helped a lot with weight. No longer needs CPAP.          Cerebral aneurysm:  In 2004 he had a cerebral angiogram for aneurysm.  He tells us that the femoral nerve was injured at the time.  He has had pain in his leg and weakness of the proximal right leg ever since.  He has a morphine pump in place and follows closely with pain  management for the last 20+ years.  He was initially wheelchair-bound but progressed to a rollator which she has been using for about 20 years.  He notes chronic memory issues ever since the aneurysm surgery.  Last visit, he told me he is on memantine but is not listed on his medication list.          Chronic strokes:  He has a history of chronic strokes and difficulty with field cuts superiorly in both eyes.  He is able to drive.           ARLEEN:  He notes significant weight loss since starting Mounjaro and tells me he was advised that he no longer needs CPAP.                 PMH: bilateral occipital and left cerebellar lobe infarcts, right ACOM aneurysm, chronic migraine headaches, COPD, chronic pain disorder, CAD,  esophageal dysphagia, GERD, hiatal hernia,  back pain s/p intrathecal pump, lumbosacral radiculopathy, DM, CKD   PSH: gastric sleeve surgery, intrathecal pump                    Objective   /78 (BP Location: Left arm, Patient Position: Sitting, Cuff Size: Standard)   Pulse 83   Temp 97.7 °F (36.5 °C) (Temporal)   Wt 113 kg (248 lb 9.6 oz)   SpO2 97%   BMI 33.72 kg/m²     Physical Exam  Constitutional:       Appearance: Normal appearance.   HENT:      Mouth/Throat:      Mouth: Mucous membranes are moist.      Pharynx: Oropharynx is clear.     Eyes:      Conjunctiva/sclera: Conjunctivae normal.     Neck:      Vascular: No carotid bruit.     Cardiovascular:      Rate and Rhythm: Normal rate and regular rhythm.      Heart sounds: Normal heart sounds.   Pulmonary:      Effort: Pulmonary effort is normal.      Breath sounds: Normal breath sounds.     Skin:     General: Skin is warm and dry.     Psychiatric:         Mood and Affect: Mood normal.         Behavior: Behavior normal.       Neurological Exam  Mental status: Awake, alert, oriented to person, place and time.  Naming, knowledge, repetition, concentration, recall intact.      Cranial nerves: Extraocular movements intact.  Pupils equally round  and reactive to light.  Visual fields R pie in the maria t deficit.  Facial sensation intact.  Face symmetric.  Speech clear. Hearing intact.  Tongue, uvula, palate midline and intact.  Sternocleidomastoid intact.     Motor:   Deltoid: Right 4+/5, left 4+/5  Biceps: Right 4+/5, left 4+/5  Triceps: Right 4+/5, left 4+/5  : Right 4/5, left 4/5  I  Significant giveaway weakness in the upper extremities     Iliopsoas: Right 4/5, left 4/5  Quadriceps: Right 4/5, left 4/5  Tibialis anterior: Right 4/5, left 4/5  Medial gastrocnemius: Right 4/5, left 4/5     Slightly high arches at last visit. I did not have him take off his shoes today.     Cerebellar: No dysmetria.  He has difficulty with heel-to-shin bilaterally due to the weakness.  He walks with a rollator.  He has difficulty arising from the chair without using his arms. Steppage gait.      Deep tendon reflexes absent x4     Sensory: Light touch intact.  Temperature reduced in a glove distribution.   Vibration reduced to the ankles. Pin prick reduced to the knees.   Physical Exam      Results        Administrative Statements   I have spent a total time of 34 minutes in caring for this patient on the day of the visit/encounter including Prognosis, Risks and benefits of tx options, Instructions for management, Patient and family education, Importance of tx compliance, Risk factor reductions, Impressions, Counseling / Coordination of care, Documenting in the medical record, Reviewing/placing orders in the medical record (including tests, medications, and/or procedures), and Obtaining or reviewing history  .

## 2025-07-01 ENCOUNTER — OFFICE VISIT (OUTPATIENT)
Dept: NEUROLOGY | Facility: CLINIC | Age: 65
End: 2025-07-01
Payer: COMMERCIAL

## 2025-07-01 VITALS
WEIGHT: 248.6 LBS | TEMPERATURE: 97.7 F | DIASTOLIC BLOOD PRESSURE: 78 MMHG | SYSTOLIC BLOOD PRESSURE: 120 MMHG | BODY MASS INDEX: 33.72 KG/M2 | HEART RATE: 83 BPM | OXYGEN SATURATION: 97 %

## 2025-07-01 DIAGNOSIS — Z79.4 TYPE 2 DIABETES MELLITUS WITH DIABETIC NEUROPATHY, WITH LONG-TERM CURRENT USE OF INSULIN (HCC): ICD-10-CM

## 2025-07-01 DIAGNOSIS — M79.2 NEUROPATHIC PAIN: Primary | ICD-10-CM

## 2025-07-01 DIAGNOSIS — E11.40 TYPE 2 DIABETES MELLITUS WITH DIABETIC NEUROPATHY, WITH LONG-TERM CURRENT USE OF INSULIN (HCC): ICD-10-CM

## 2025-07-01 DIAGNOSIS — Z99.89 USES WALKER: ICD-10-CM

## 2025-07-01 DIAGNOSIS — G43.709 CHRONIC MIGRAINE WITHOUT AURA WITHOUT STATUS MIGRAINOSUS, NOT INTRACTABLE: ICD-10-CM

## 2025-07-01 PROCEDURE — 99214 OFFICE O/P EST MOD 30 MIN: CPT | Performed by: PSYCHIATRY & NEUROLOGY

## 2025-07-01 PROCEDURE — G2211 COMPLEX E/M VISIT ADD ON: HCPCS | Performed by: PSYCHIATRY & NEUROLOGY

## 2025-07-01 RX ORDER — TOPIRAMATE 50 MG/1
50 TABLET, FILM COATED ORAL EVERY 12 HOURS SCHEDULED
Qty: 60 TABLET | Refills: 5 | Status: SHIPPED | OUTPATIENT
Start: 2025-07-01

## 2025-07-01 NOTE — ASSESSMENT & PLAN NOTE
He has combination of chronic daily headache and chronic migraine.  I did refer him to the Botox clinic, but I do not see that he ever had an appointment.  Fortunately, increasing topiramate to 50 mg twice daily has been helpful.  He says the headaches are infrequent now.    Recommendations:  - Continue topiramate 50 mg twice daily.  New prescription sent.     Orders:  •  topiramate (Topamax) 50 MG tablet; Take 1 tablet (50 mg total) by mouth every 12 (twelve) hours

## 2025-07-01 NOTE — ASSESSMENT & PLAN NOTE
On exam, he has components of both large and small fiber neuropathy.  The only clear underlying etiology is diabetes.  B12, B1, B6, Lyme, zinc and immunofixation were all unremarkable.        He tried Lyrica in the past which was also not helpful.  I am reluctant to try a tricyclic antidepressant because of borderline elevated QTc.  He felt the duloxetine was too sedating.  He has tried lidocaine ointment which caused more pain than improvement due to the pain from the actual application.  He has a spinal cord stimulator in place which has not been helpful.  He remains on gabapentin 2400 mg daily.  He has not noticed a benefit from topiramate 25 mg twice daily.  No kidney stones.  Alpha lipoic acid was not helpful and it was stopped.    He understands that his current pain level may be his new baseline.  He is accepting of that.     Recommendations:  -Continue gabapentin 800 mg 4 times daily.    - Continue topiramate 50 mg twice daily although this is more helpful with the migraine and with the neuropathy.  - I will send a prescription for a wheelchair through [employer]       Orders:  •  topiramate (Topamax) 50 MG tablet; Take 1 tablet (50 mg total) by mouth every 12 (twelve) hours

## 2025-07-01 NOTE — ASSESSMENT & PLAN NOTE
Lab Results   Component Value Date    HGBA1C 5.9 (H) 03/26/2025     HbA1c has improved somewhat.  He has lost weight on Mounjaro.

## 2025-07-01 NOTE — ASSESSMENT & PLAN NOTE
[facility] to his mailing address on [facility] as requested by the patient.        History of CVA (cerebrovascular accident)  He has a history of cerebellar and occipital strokes.  He does have a field cut.  He is on aspirin and Plavix.     Presence of intrathecal pump  He had previous injury to the femoral nerve in 2004.  He has a morphine pump and is followed by pain management.       Cerebral aneurysm  He had previous intervention in 2004 and is followed by neurosurgery.      He will follow-up in 6 months or sooner if difficulties.

## 2025-07-02 ENCOUNTER — TELEPHONE (OUTPATIENT)
Dept: NEUROLOGY | Facility: CLINIC | Age: 65
End: 2025-07-02

## 2025-07-03 LAB

## 2025-07-06 DIAGNOSIS — E11.40 TYPE 2 DIABETES MELLITUS WITH DIABETIC NEUROPATHY, WITH LONG-TERM CURRENT USE OF INSULIN (HCC): ICD-10-CM

## 2025-07-06 DIAGNOSIS — Z79.4 TYPE 2 DIABETES MELLITUS WITH DIABETIC NEUROPATHY, WITH LONG-TERM CURRENT USE OF INSULIN (HCC): ICD-10-CM

## 2025-07-06 DIAGNOSIS — L85.3 XEROSIS OF SKIN: ICD-10-CM

## 2025-07-08 LAB

## 2025-07-08 RX ORDER — AMMONIUM LACTATE 12 G/100G
CREAM TOPICAL
Qty: 385 G | Refills: 0 | Status: SHIPPED | OUTPATIENT
Start: 2025-07-08

## 2025-07-09 ENCOUNTER — TELEPHONE (OUTPATIENT)
Dept: NEUROLOGY | Facility: CLINIC | Age: 65
End: 2025-07-09

## 2025-07-09 NOTE — TELEPHONE ENCOUNTER
This is refill request for transdermal cream.   Please have dr springer review and sign if agreeable

## 2025-07-09 NOTE — TELEPHONE ENCOUNTER
Micah for a specialty pharmacy to get a rill on the patient's transdemal patches, I look in the patient's chart but I did not see these patches on his active medication list, so Micah would like the office to call him back to let him know if there is a refill on this medication, Micah's phone number is [phone] option 2

## 2025-07-10 NOTE — TELEPHONE ENCOUNTER
Ondina Ledesma MA  PH    7/9/25 10:18 AM  Note     Micah for a specialty pharmacy to get a rill on the patient's transdemal patches, I look in the patient's chart but I did not see these patches on his active medication list, so Micah would like the office to call him back to let him know if there is a refill on this medication, Micah's phone number is [phone] option 2

## 2025-07-12 DIAGNOSIS — E11.65 TYPE 2 DIABETES MELLITUS WITH HYPERGLYCEMIA, WITHOUT LONG-TERM CURRENT USE OF INSULIN (HCC): ICD-10-CM

## 2025-07-12 DIAGNOSIS — E11.40 TYPE 2 DIABETES MELLITUS WITH DIABETIC NEUROPATHY, WITHOUT LONG-TERM CURRENT USE OF INSULIN (HCC): ICD-10-CM

## 2025-07-14 RX ORDER — TIRZEPATIDE 10 MG/.5ML
INJECTION, SOLUTION SUBCUTANEOUS
Qty: 2 ML | Refills: 0 | Status: SHIPPED | OUTPATIENT
Start: 2025-07-14

## 2025-07-14 RX ORDER — PROCHLORPERAZINE 25 MG/1
SUPPOSITORY RECTAL
Qty: 1 EACH | Refills: 1 | Status: SHIPPED | OUTPATIENT
Start: 2025-07-14

## 2025-07-14 RX ORDER — PROCHLORPERAZINE 25 MG/1
SUPPOSITORY RECTAL
Qty: 9 EACH | Refills: 0 | OUTPATIENT
Start: 2025-07-14

## 2025-07-17 ENCOUNTER — RA CDI HCC (OUTPATIENT)
Dept: OTHER | Facility: HOSPITAL | Age: 65
End: 2025-07-17

## 2025-07-17 NOTE — PROGRESS NOTES
HCC coding opportunities          Chart Reviewed number of suggestions sent to Provider: 2  E11.22  E11.36       Patients Insurance     Medicare Insurance: Medicare / [facility]

## 2025-07-20 DIAGNOSIS — F33.1 MAJOR DEPRESSIVE DISORDER, RECURRENT, MODERATE (HCC): ICD-10-CM

## 2025-07-21 RX ORDER — TRAZODONE HYDROCHLORIDE 50 MG/1
50 TABLET ORAL 2 TIMES DAILY
Qty: 180 TABLET | Refills: 0 | Status: SHIPPED | OUTPATIENT
Start: 2025-07-21

## 2025-07-22 LAB

## 2025-07-23 ENCOUNTER — OFFICE VISIT (OUTPATIENT)
Dept: FAMILY MEDICINE CLINIC | Facility: HOSPITAL | Age: 65
End: 2025-07-23
Payer: COMMERCIAL

## 2025-07-23 VITALS
WEIGHT: 239.2 LBS | SYSTOLIC BLOOD PRESSURE: 128 MMHG | DIASTOLIC BLOOD PRESSURE: 80 MMHG | BODY MASS INDEX: 32.4 KG/M2 | HEIGHT: 72 IN | HEART RATE: 74 BPM | TEMPERATURE: 98.2 F | OXYGEN SATURATION: 97 %

## 2025-07-23 DIAGNOSIS — J44.9 CHRONIC OBSTRUCTIVE PULMONARY DISEASE, UNSPECIFIED COPD TYPE (HCC): ICD-10-CM

## 2025-07-23 DIAGNOSIS — E11.40 TYPE 2 DIABETES MELLITUS WITH DIABETIC NEUROPATHY, WITH LONG-TERM CURRENT USE OF INSULIN (HCC): ICD-10-CM

## 2025-07-23 DIAGNOSIS — F33.1 MAJOR DEPRESSIVE DISORDER, RECURRENT, MODERATE (HCC): Chronic | ICD-10-CM

## 2025-07-23 DIAGNOSIS — Z79.4 TYPE 2 DIABETES MELLITUS WITH DIABETIC NEUROPATHY, WITH LONG-TERM CURRENT USE OF INSULIN (HCC): ICD-10-CM

## 2025-07-23 DIAGNOSIS — E11.65 TYPE 2 DIABETES MELLITUS WITH HYPERGLYCEMIA, WITHOUT LONG-TERM CURRENT USE OF INSULIN (HCC): Primary | ICD-10-CM

## 2025-07-23 PROCEDURE — G2211 COMPLEX E/M VISIT ADD ON: HCPCS | Performed by: NURSE PRACTITIONER

## 2025-07-23 PROCEDURE — 99214 OFFICE O/P EST MOD 30 MIN: CPT | Performed by: NURSE PRACTITIONER

## 2025-07-23 NOTE — ASSESSMENT & PLAN NOTE
Lab Results   Component Value Date    HGBA1C 5.9 (H) 03/26/2025   A1C is controlled and he has had benefit of weight loss with Mounjaro.   DM managed by tonya CESPEDES with foot and eye exams.

## 2025-07-23 NOTE — ASSESSMENT & PLAN NOTE
Lab Results   Component Value Date    HGBA1C 5.9 (H) 03/26/2025     Follows with neurology for neuropathy.

## 2025-07-23 NOTE — PROGRESS NOTES
Name: Aristeo Hall      : 1960      MRN: [id number]  Encounter Provider: JACLYN Ospina  Encounter Date: 2025   Encounter department: [facility] [address]   :  Assessment & Plan  Type 2 diabetes mellitus with hyperglycemia, without long-term current use of insulin (HCC)    Lab Results   Component Value Date    HGBA1C 5.9 (H) 2025   A1C is controlled and he has had benefit of weight loss with Mounjaro.   DM managed by tonya CESPEDES with foot and eye exams.          Type 2 diabetes mellitus with diabetic neuropathy, with long-term current use of insulin (HCC)    Lab Results   Component Value Date    HGBA1C 5.9 (H) 2025     Follows with neurology for neuropathy.        Major depressive disorder, recurrent, moderate (HCC)  Mood is controlled.   Continue on current regimen.          Chronic obstructive pulmonary disease, unspecified COPD type (HCC)  Breathing is controlled.   Managed by pulmonary. Next appointment in October.               History of Present Illness   Blood sugars have been good. He is losing weight on Mounjaro. Neuropathy remains an issue. Follows with neurology.   Breathing is ok. Managed by pulmonary. Had bronchitis but this has resolved.   Mood is good. He is happy with current regimen.       Review of Systems   Constitutional:  Negative for fatigue and unexpected weight change.   Eyes:  Negative for visual disturbance.   Respiratory:  Negative for chest tightness, shortness of breath and wheezing.    Cardiovascular:  Negative for chest pain, palpitations and leg swelling.   Musculoskeletal:  Negative for myalgias.   Neurological:  Positive for numbness. Negative for dizziness, weakness, light-headedness and headaches.   Psychiatric/Behavioral:  Negative for dysphoric mood. The patient is not nervous/anxious.        Objective   There were no vitals taken for this visit.     Physical Exam  Vitals reviewed.   Constitutional:        Appearance: Normal appearance. He is obese.     Cardiovascular:      Rate and Rhythm: Normal rate and regular rhythm.      Heart sounds: Normal heart sounds. No murmur heard.  Pulmonary:      Effort: Pulmonary effort is normal.      Breath sounds: Normal breath sounds.     Skin:     General: Skin is warm and dry.     Neurological:      Mental Status: He is alert and oriented to person, place, and time.     Psychiatric:         Mood and Affect: Mood normal.         Behavior: Behavior normal.         Thought Content: Thought content normal.         Judgment: Judgment normal.

## 2025-08-01 ENCOUNTER — APPOINTMENT (OUTPATIENT)
Dept: RADIOLOGY | Facility: CLINIC | Age: 65
End: 2025-08-01
Attending: FAMILY MEDICINE
Payer: COMMERCIAL

## 2025-08-01 ENCOUNTER — OFFICE VISIT (OUTPATIENT)
Dept: URGENT CARE | Facility: CLINIC | Age: 65
End: 2025-08-01
Payer: COMMERCIAL

## 2025-08-01 VITALS — HEART RATE: 62 BPM | TEMPERATURE: 97.2 F | RESPIRATION RATE: 16 BRPM | OXYGEN SATURATION: 98 %

## 2025-08-01 DIAGNOSIS — M25.512 ACUTE PAIN OF LEFT SHOULDER: Primary | ICD-10-CM

## 2025-08-01 DIAGNOSIS — M25.512 ACUTE PAIN OF LEFT SHOULDER: ICD-10-CM

## 2025-08-01 PROCEDURE — 99213 OFFICE O/P EST LOW 20 MIN: CPT

## 2025-08-01 PROCEDURE — S9083 URGENT CARE CENTER GLOBAL: HCPCS

## 2025-08-01 PROCEDURE — G0463 HOSPITAL OUTPT CLINIC VISIT: HCPCS

## 2025-08-01 PROCEDURE — 73030 X-RAY EXAM OF SHOULDER: CPT

## 2025-08-06 VITALS — HEIGHT: 72 IN | BODY MASS INDEX: 32.44 KG/M2

## 2025-08-06 DIAGNOSIS — E11.40 TYPE 2 DIABETES MELLITUS WITH DIABETIC NEUROPATHY, WITH LONG-TERM CURRENT USE OF INSULIN (HCC): ICD-10-CM

## 2025-08-06 DIAGNOSIS — L85.3 XEROSIS OF SKIN: ICD-10-CM

## 2025-08-06 DIAGNOSIS — S46.012A ROTATOR CUFF STRAIN, LEFT, INITIAL ENCOUNTER: Primary | ICD-10-CM

## 2025-08-06 DIAGNOSIS — Z79.4 TYPE 2 DIABETES MELLITUS WITH DIABETIC NEUROPATHY, WITH LONG-TERM CURRENT USE OF INSULIN (HCC): ICD-10-CM

## 2025-08-06 PROCEDURE — 20610 DRAIN/INJ JOINT/BURSA W/O US: CPT | Performed by: ORTHOPAEDIC SURGERY

## 2025-08-06 PROCEDURE — 99204 OFFICE O/P NEW MOD 45 MIN: CPT | Performed by: ORTHOPAEDIC SURGERY

## 2025-08-06 RX ORDER — BUPIVACAINE HYDROCHLORIDE 2.5 MG/ML
3 INJECTION, SOLUTION INFILTRATION; PERINEURAL
Status: COMPLETED | OUTPATIENT
Start: 2025-08-06 | End: 2025-08-06

## 2025-08-06 RX ORDER — AMMONIUM LACTATE 12 G/100G
CREAM TOPICAL
Qty: 385 G | Refills: 0 | Status: SHIPPED | OUTPATIENT
Start: 2025-08-06

## 2025-08-06 RX ORDER — TRIAMCINOLONE ACETONIDE 40 MG/ML
40 INJECTION, SUSPENSION INTRA-ARTICULAR; INTRAMUSCULAR
Status: COMPLETED | OUTPATIENT
Start: 2025-08-06 | End: 2025-08-06

## 2025-08-06 RX ADMIN — TRIAMCINOLONE ACETONIDE 40 MG: 40 INJECTION, SUSPENSION INTRA-ARTICULAR; INTRAMUSCULAR at 07:45

## 2025-08-06 RX ADMIN — BUPIVACAINE HYDROCHLORIDE 3 ML: 2.5 INJECTION, SOLUTION INFILTRATION; PERINEURAL at 07:45

## 2025-08-19 ENCOUNTER — EVALUATION (OUTPATIENT)
Dept: PHYSICAL THERAPY | Facility: CLINIC | Age: 65
End: 2025-08-19
Attending: ORTHOPAEDIC SURGERY
Payer: COMMERCIAL

## 2025-08-19 DIAGNOSIS — S46.012D STRAIN OF TENDON OF LEFT ROTATOR CUFF, SUBSEQUENT ENCOUNTER: ICD-10-CM

## 2025-08-19 PROCEDURE — 97162 PT EVAL MOD COMPLEX 30 MIN: CPT | Performed by: PHYSICAL THERAPIST

## (undated) DEVICE — TUBING SUCTION 5MM X 12 FT

## (undated) DEVICE — SUT SILK 2-0 SH 30 IN K833H

## (undated) DEVICE — GLOVE SRG BIOGEL 6.5

## (undated) DEVICE — STERI DRAPE 1000 NON-STERILE ROLL

## (undated) DEVICE — SUT ETHIBOND 0 CT-1 30 IN X424H

## (undated) DEVICE — GLOVE INDICATOR PI UNDERGLOVE SZ 7.5 BLUE

## (undated) DEVICE — ADHESIVE SKIN HIGH VISCOSITY EXOFIN 1ML

## (undated) DEVICE — 3M™ STERI-STRIP™ REINFORCED ADHESIVE SKIN CLOSURES, R1547, 1/2 IN X 4 IN (12 MM X 100 MM), 6 STRIPS/ENVELOPE: Brand: 3M™ STERI-STRIP™

## (undated) DEVICE — GAUZE SPONGES,16 PLY: Brand: CURITY

## (undated) DEVICE — BETHLEHEM UNIVERSAL MINOR GEN: Brand: CARDINAL HEALTH

## (undated) DEVICE — PROGRAMMER EXTRNL WIRELESS NEURO STIM RS2

## (undated) DEVICE — DRAPE SHEET X-LG

## (undated) DEVICE — VIAL DECANTER

## (undated) DEVICE — PLUMEPEN PRO 10FT

## (undated) DEVICE — CURITY PLAIN PACKING STRIP: Brand: CURITY

## (undated) DEVICE — VISIGI 3D®  CALIBRATION SYSTEM  SIZE 36FR SLEEVE/STD: Brand: BOEHRINGER® VISIGI 3D™ SLEEVE GASTRECTOMY CALIBRATION SYSTEM, SIZE 36FR

## (undated) DEVICE — TROCAR: Brand: KII FIOS FIRST ENTRY

## (undated) DEVICE — COBAN 4 IN STERILE

## (undated) DEVICE — INTENDED FOR TISSUE SEPARATION, AND OTHER PROCEDURES THAT REQUIRE A SHARP SURGICAL BLADE TO PUNCTURE OR CUT.: Brand: BARD-PARKER SAFETY BLADES SIZE 10, STERILE

## (undated) DEVICE — INTENDED FOR TISSUE SEPARATION, AND OTHER PROCEDURES THAT REQUIRE A SHARP SURGICAL BLADE TO PUNCTURE OR CUT.: Brand: BARD-PARKER SAFETY BLADES SIZE 11, STERILE

## (undated) DEVICE — INTENDED FOR TISSUE SEPARATION, AND OTHER PROCEDURES THAT REQUIRE A SHARP SURGICAL BLADE TO PUNCTURE OR CUT.: Brand: BARD-PARKER ® CARBON RIB-BACK BLADES

## (undated) DEVICE — PENCIL ELECTROSURG E-Z CLEAN -0035H

## (undated) DEVICE — GLIDESHEATH BASIC HYDROPHILIC COATED INTRODUCER SHEATH: Brand: GLIDESHEATH

## (undated) DEVICE — COLUMN DRAPE

## (undated) DEVICE — GLOVE SRG BIOGEL ECLIPSE 7

## (undated) DEVICE — LAPAROSCOPIC DUAL RIGID APPLICATOR: Brand: ETHICON

## (undated) DEVICE — WEBRIL 6 IN UNSTERILE

## (undated) DEVICE — CHLORAPREP HI-LITE 26ML ORANGE

## (undated) DEVICE — 2000CC GUARDIAN II: Brand: GUARDIAN

## (undated) DEVICE — SYRINGE 20ML LL

## (undated) DEVICE — SYRINGE 10ML LL CONTROL TOP

## (undated) DEVICE — ELECTRODE BLADE MOD E-Z CLEAN 2.5IN 6.4CM -0012M

## (undated) DEVICE — VESSEL SEALER EXTEND: Brand: ENDOWRIST

## (undated) DEVICE — 3M™ IOBAN™ 2 ANTIMICROBIAL INCISE DRAPE 6650EZ: Brand: IOBAN™ 2

## (undated) DEVICE — GLOVE INDICATOR PI UNDERGLOVE SZ 6.5 BLUE

## (undated) DEVICE — TIBURON SPLIT SHEET: Brand: CONVERTORS

## (undated) DEVICE — GLOVE SRG BIOGEL ECLIPSE 7.5

## (undated) DEVICE — ADHESIVE SKN CLSR HISTOACRYL FLEX 0.5ML LF

## (undated) DEVICE — GLOVE SRG BIOGEL 8

## (undated) DEVICE — PROGRAMMER NERUOSTIM COMM HANDSET KIT

## (undated) DEVICE — SPONGE SCRUB 4 PCT CHLORHEXIDINE

## (undated) DEVICE — ANTIBACTERIAL VIOLET BRAIDED (POLYGLACTIN 910), SYNTHETIC ABSORBABLE SUTURE: Brand: COATED VICRYL

## (undated) DEVICE — BULB SYRINGE,IRRIGATION WITH PROTECTIVE CAP: Brand: DOVER

## (undated) DEVICE — GOWN,SLEEVE,STERILE,W/CSR WRAP,1/P: Brand: MEDLINE

## (undated) DEVICE — NEEDLE 22 G X 1 1/2 SAFETY

## (undated) DEVICE — KIT, ROBOTIC BARIATRIC: Brand: CARDINAL HEALTH

## (undated) DEVICE — CADIERE FORCEPS: Brand: ENDOWRIST

## (undated) DEVICE — VIOLET BRAIDED (POLYGLACTIN 910), SYNTHETIC ABSORBABLE SUTURE: Brand: COATED VICRYL

## (undated) DEVICE — SUT VICRYL 2-0 CT-2 27 IN J269H

## (undated) DEVICE — SYRINGE 3ML LL

## (undated) DEVICE — TRAVELKIT CONTAINS FIRST STEP KIT (200ML EP-4 KIT) AND SOILED SCOPE BAG - 1 KIT: Brand: TRAVELKIT CONTAINS FIRST STEP KIT AND SOILED SCOPE BAG

## (undated) DEVICE — MEGA SUTURECUT ND: Brand: ENDOWRIST

## (undated) DEVICE — BAG DECANTER

## (undated) DEVICE — INTENDED FOR TISSUE SEPARATION, AND OTHER PROCEDURES THAT REQUIRE A SHARP SURGICAL BLADE TO PUNCTURE OR CUT.: Brand: BARD-PARKER SAFETY BLADES SIZE 15, STERILE

## (undated) DEVICE — UNIVERSAL SPINE,KIT: Brand: CARDINAL HEALTH

## (undated) DEVICE — PROGRAMMER PATIENT THERAPY MANAGER RS2

## (undated) DEVICE — SUT MONOCRYL 4-0 PS-2 27 IN Y426H

## (undated) DEVICE — CATHETER 8591-38 PASSER,38CM

## (undated) DEVICE — MONITORING SPINAL IMPULSE CASE FEE

## (undated) DEVICE — PAD GROUNDING ADULT

## (undated) DEVICE — DRESSING MEPILEX AG BORDER 4 X 8 IN

## (undated) DEVICE — 10FR FRAZIER SUCTION HANDLE: Brand: CARDINAL HEALTH

## (undated) DEVICE — GLOVE INDICATOR PI UNDERGLOVE SZ 7 BLUE

## (undated) DEVICE — STRL PENROSE DRAIN 18" X 1/4": Brand: CARDINAL HEALTH

## (undated) DEVICE — OCCLUSIVE GAUZE STRIP,3% BISMUTH TRIBROMOPHENATE IN PETROLATUM BLEND: Brand: XEROFORM

## (undated) DEVICE — STANDARD SURGICAL GOWN, L: Brand: CONVERTORS

## (undated) DEVICE — SCD SEQUENTIAL COMPRESSION COMFORT SLEEVE MEDIUM KNEE LENGTH: Brand: KENDALL SCD

## (undated) DEVICE — TIBURON TRANSVERSE LAPAROTOMY SHEET: Brand: CONVERTORS

## (undated) DEVICE — SCD SEQUENTIAL COMPRESSION COMFORT SLEEVE LARGE KNEE LENGTH: Brand: KENDALL SCD

## (undated) DEVICE — URETERAL CATHETER ADAPTOR TIP

## (undated) DEVICE — SINGLE-USE BIOPSY FORCEPS: Brand: RADIAL JAW 4

## (undated) DEVICE — DRAPE EQUIPMENT RF WAND

## (undated) DEVICE — GUIDEWIRE WHOLEY HI TORQUE INTERM MOD J .035 145CM

## (undated) DEVICE — SPONGE STICK WITH PVP-I: Brand: KENDALL

## (undated) DEVICE — MEDI-VAC NON-CONDUCTIVE SUCTION TUBING 6MM X 1.8M (6FT.) L: Brand: CARDINAL HEALTH

## (undated) DEVICE — PREP SURGICAL PURPREP 26ML

## (undated) DEVICE — SIZING TOOL LINX REFLUX SYS

## (undated) DEVICE — DRAPE C-ARM X-RAY

## (undated) DEVICE — Device

## (undated) DEVICE — SYRINGE EPI 8ML LUER SLIP LOSS OF RESISTANCE PLASTIC PERFIX

## (undated) DEVICE — RADIFOCUS OPTITORQUE ANGIOGRAPHIC CATHETER: Brand: OPTITORQUE

## (undated) DEVICE — RECHARGER PRGRMR THERAPY MANAGER RS2

## (undated) DEVICE — SPONGE LAP 18 X 4 IN

## (undated) DEVICE — SUT VICRYL 2-0 SH 27 IN UNDYED J417H

## (undated) DEVICE — ABDOMINAL PAD: Brand: DERMACEA

## (undated) DEVICE — THE SIMPULSE SOLO SYSTEM WITH ULTREX RETRACTABLE SPLASH SHIELD TIP: Brand: SIMPULSE SOLO

## (undated) DEVICE — ELECTRODE BLADE MOD E-Z CLEAN  2.75IN 7CM -0012AM

## (undated) DEVICE — TR BAND RADIAL ARTERY COMPRESSION DEVICE: Brand: TR BAND

## (undated) DEVICE — GLOVE INDICATOR PI UNDERGLOVE SZ 8 BLUE

## (undated) DEVICE — SYRINGE 10ML LL

## (undated) DEVICE — NEEDLE HYPO 22G X 1-1/2 IN

## (undated) DEVICE — SURGICAL GOWN, XL SMARTSLEEVE: Brand: CONVERTORS

## (undated) DEVICE — DRAPE C-ARMOUR

## (undated) DEVICE — SUT VICRYL 1 CT-1 27 IN J261H

## (undated) DEVICE — 2963 MEDIPORE SOFT CLOTH TAPE 3 IN X 10 YD 12 RLS/CS: Brand: 3M™ MEDIPORE™

## (undated) DEVICE — PACKING VAGINAL 2 IN

## (undated) DEVICE — SPONGE LAP 18 X 18 IN STRL RFD

## (undated) DEVICE — GLOVE SRG BIOGEL 7

## (undated) DEVICE — SYRINGE 1ML TB 25G X 5/8 NON SAFETY

## (undated) DEVICE — ARM DRAPE

## (undated) DEVICE — [HIGH FLOW INSUFFLATOR,  DO NOT USE IF PACKAGE IS DAMAGED,  KEEP DRY,  KEEP AWAY FROM SUNLIGHT,  PROTECT FROM HEAT AND RADIOACTIVE SOURCES.]: Brand: PNEUMOSURE

## (undated) DEVICE — DISPOSABLE OR TOWEL: Brand: CARDINAL HEALTH

## (undated) DEVICE — BLADELESS OBTURATOR: Brand: WECK VISTA

## (undated) DEVICE — SUT MONOCRYL PLUS 4-0 PS-2 18 IN MCP496G

## (undated) DEVICE — CANNULA SEAL

## (undated) DEVICE — DRESSING MAXORB EXTRA AG 4 IN X 4.75 IN

## (undated) DEVICE — SUT SILK 2 60 IN SA8H

## (undated) DEVICE — LIGHT HANDLE COVER SLEEVE DISP BLUE STELLAR

## (undated) DEVICE — DECANTER: Brand: UNBRANDED

## (undated) DEVICE — RX-2™ COUDÉ®  EPIDURAL NEEDLE 14G TW X 4.0": Brand: EPIMED

## (undated) DEVICE — SUT MONOCRYL 3-0 PS-2 27 IN Y427H

## (undated) DEVICE — SUT VICRYL 1 CTX 36 IN J977H

## (undated) DEVICE — SUPPLY FEE STD

## (undated) DEVICE — 3000CC GUARDIAN II: Brand: GUARDIAN

## (undated) DEVICE — GLOVE SRG BIOGEL 7.5

## (undated) DEVICE — ENDOPATH XCEL BLADELESS TROCARS WITH STABILITY SLEEVES: Brand: ENDOPATH XCEL